# Patient Record
Sex: FEMALE | Race: BLACK OR AFRICAN AMERICAN | Employment: OTHER | URBAN - METROPOLITAN AREA
[De-identification: names, ages, dates, MRNs, and addresses within clinical notes are randomized per-mention and may not be internally consistent; named-entity substitution may affect disease eponyms.]

---

## 2021-10-09 ENCOUNTER — TELEPHONE (OUTPATIENT)
Dept: INTERNAL MEDICINE CLINIC | Facility: CLINIC | Age: 81
End: 2021-10-09

## 2021-10-10 NOTE — TELEPHONE ENCOUNTER
Can do 4 PM 11-5-21 at [facility]. Can see Dr. Desmond Palomares. WE needs records from prior MD's.

## 2021-11-04 PROBLEM — Z71.85 VACCINE COUNSELING: Status: ACTIVE | Noted: 2021-11-04

## 2021-11-04 PROBLEM — Z12.11 COLON CANCER SCREENING: Status: ACTIVE | Noted: 2021-11-04

## 2021-11-04 PROBLEM — Z00.00 ADULT GENERAL MEDICAL EXAMINATION: Status: ACTIVE | Noted: 2021-11-04

## 2021-11-05 ENCOUNTER — OFFICE VISIT (OUTPATIENT)
Dept: INTERNAL MEDICINE CLINIC | Facility: CLINIC | Age: 81
End: 2021-11-05
Payer: MEDICARE

## 2021-11-05 VITALS
WEIGHT: 151 LBS | HEART RATE: 85 BPM | HEIGHT: 61 IN | DIASTOLIC BLOOD PRESSURE: 63 MMHG | SYSTOLIC BLOOD PRESSURE: 110 MMHG | TEMPERATURE: 98 F | RESPIRATION RATE: 17 BRPM | BODY MASS INDEX: 28.51 KG/M2

## 2021-11-05 DIAGNOSIS — C50.911 INFILTRATING DUCTAL CARCINOMA OF RIGHT BREAST, STAGE 1 (HCC): ICD-10-CM

## 2021-11-05 DIAGNOSIS — E78.5 DYSLIPIDEMIA: ICD-10-CM

## 2021-11-05 DIAGNOSIS — Z12.11 COLON CANCER SCREENING: ICD-10-CM

## 2021-11-05 DIAGNOSIS — D86.9 SARCOIDOSIS: ICD-10-CM

## 2021-11-05 DIAGNOSIS — I10 PRIMARY HYPERTENSION: ICD-10-CM

## 2021-11-05 DIAGNOSIS — D50.0 IRON DEFICIENCY ANEMIA DUE TO CHRONIC BLOOD LOSS: ICD-10-CM

## 2021-11-05 DIAGNOSIS — M06.9 RHEUMATOID ARTHRITIS, INVOLVING UNSPECIFIED SITE, UNSPECIFIED WHETHER RHEUMATOID FACTOR PRESENT (HCC): ICD-10-CM

## 2021-11-05 DIAGNOSIS — Z00.00 ADULT GENERAL MEDICAL EXAMINATION: Primary | ICD-10-CM

## 2021-11-05 DIAGNOSIS — E83.52 HYPERCALCEMIA: ICD-10-CM

## 2021-11-05 DIAGNOSIS — Z71.85 VACCINE COUNSELING: ICD-10-CM

## 2021-11-05 DIAGNOSIS — E11.9 TYPE 2 DIABETES MELLITUS WITHOUT COMPLICATION, WITHOUT LONG-TERM CURRENT USE OF INSULIN (HCC): ICD-10-CM

## 2021-11-05 DIAGNOSIS — R05.9 COUGH: ICD-10-CM

## 2021-11-05 PROBLEM — M19.90 OSTEOARTHROSIS: Status: ACTIVE | Noted: 2021-11-05

## 2021-11-05 PROCEDURE — 99205 OFFICE O/P NEW HI 60 MIN: CPT | Performed by: INTERNAL MEDICINE

## 2021-11-05 RX ORDER — SPIRONOLACTONE 25 MG/1
25 TABLET ORAL DAILY
COMMUNITY
End: 2021-12-15

## 2021-11-05 RX ORDER — PREDNISONE 1 MG/1
1 TABLET ORAL
COMMUNITY
End: 2021-11-23

## 2021-11-05 RX ORDER — CYCLOBENZAPRINE HCL 10 MG
10 TABLET ORAL NIGHTLY
COMMUNITY
End: 2021-12-15

## 2021-11-05 RX ORDER — MONTELUKAST SODIUM 10 MG/1
10 TABLET ORAL NIGHTLY
Qty: 30 TABLET | Refills: 3 | Status: SHIPPED | OUTPATIENT
Start: 2021-11-05 | End: 2021-12-07 | Stop reason: ALTCHOICE

## 2021-11-05 RX ORDER — FERROUS SULFATE 325(65) MG
325 TABLET ORAL
COMMUNITY

## 2021-11-05 RX ORDER — ACETAMINOPHEN 500 MG
500 TABLET ORAL EVERY 6 HOURS PRN
COMMUNITY

## 2021-11-05 RX ORDER — ASPIRIN 81 MG/1
81 TABLET ORAL DAILY
COMMUNITY

## 2021-11-05 RX ORDER — CLONAZEPAM 0.5 MG/1
0.5 TABLET ORAL 2 TIMES DAILY PRN
COMMUNITY

## 2021-11-05 RX ORDER — MECLIZINE HYDROCHLORIDE 25 MG/1
25 TABLET ORAL
COMMUNITY
End: 2021-12-07 | Stop reason: ALTCHOICE

## 2021-11-05 RX ORDER — DOCUSATE SODIUM 100 MG/1
100 CAPSULE, LIQUID FILLED ORAL DAILY
COMMUNITY

## 2021-11-05 RX ORDER — ATORVASTATIN CALCIUM 80 MG/1
80 TABLET, FILM COATED ORAL DAILY
COMMUNITY
Start: 2021-07-13 | End: 2021-11-09

## 2021-11-05 RX ORDER — CARVEDILOL 25 MG/1
25 TABLET ORAL 2 TIMES DAILY WITH MEALS
COMMUNITY
End: 2022-01-17

## 2021-11-05 NOTE — PROGRESS NOTES
HPI:    Patient ID: Jennifer Spear is a 79year old female. Jennifer Spear is a 79year old female who presents to establish care in [place]. Just moved from [place]. New to me.    HPI:   Patient presents with:  Establish Care: Patient takes one tablet by mouth twice a day with meals. • atorvastatin 80 MG Oral Tab Take 80 mg by mouth daily. • montelukast (SINGULAIR) 10 MG Oral Tab Take 1 tablet (10 mg total) by mouth nightly.  30 tablet 3   • cyanocobalamin 100 MCG Oral Ta 11/5/2021   BMI (kg/m2) - 28.53   Weight (enc vitals) - 151 lb   BP (enc vitals) - 110/63   Last Foot Exam - 11/5/2021   PHQ2 Score - 0      Last eye exam had done. In [place]. Will check records.   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 The current episode started more than 1 month ago. 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 ). The problem occurs constantly.  The problem has been polyphagia and polyuria. Genitourinary: Negative for decreased urine volume, difficulty urinating, dysuria, flank pain, frequency, hematuria, menstrual problem, pelvic pain, urgency, vaginal bleeding, vaginal discharge and vaginal pain.         Only since 25 MG Oral Tab Take 25 mg by mouth 2 (two) times daily with meals. Patient takes one tablet by mouth twice a day with meals. • atorvastatin 80 MG Oral Tab Take 80 mg by mouth daily.      • montelukast (SINGULAIR) 10 MG Oral Tab Take 1 tablet (10 mg tota from Last 3 Encounters:  11/05/21 : 151 lb (68.5 kg)      Physical Exam  Vitals and nursing note reviewed. Constitutional:       General: She is not in acute distress. Appearance: Normal appearance. She is well-developed and well-groomed.  She is not chemosis, exudate or hemorrhage. Left eye: Left conjunctiva is not injected. No chemosis, exudate or hemorrhage. Pupils: Pupils are equal, round, and reactive to light. Neck:      Thyroid: No thyroid mass, thyromegaly or thyroid tenderness. right CVA tenderness, left CVA tenderness, guarding or rebound. Genitourinary:     Exam position: Supine. Musculoskeletal:      Right wrist: No swelling, deformity, effusion, lacerations, tenderness, bony tenderness, snuff box tenderness or crepitus.  D Bilateral barefoot skin diabetic exam is normal, visualized feet and the appearance is normal.  Bilateral sensation of both feet is normal.  Pulsation pedal pulse exam of both lower legs/feet is normal as well.          ASSESSMENT/PLAN:   Adult general least 30 minutes 3-4 times a week. Check blood pressures at different times on different days. Can purchase own blood pressure monitor. If not, check at local pharmacy. Bake foods more and grill occasionally. Avoid fried foods. No salt.  Use other seasoning

## 2021-11-05 NOTE — PATIENT INSTRUCTIONS
ASSESSMENT/PLAN:   Adult general medical examination  (primary encounter diagnosis) check records from [place]. Vaccine counseling recommend Covid #3 vaccine. Can call [phone] to get vaccine or through local pharmacy.   2 weeks after Covid vac foods. No salt. Use other seasonings. Hypercalcemia check blood. Dyslipidemia check blood. Iron deficiency anemia due to chronic blood loss check blood 2 months after being on iron therapy.     Colon cancer screening check testing in [place] chickenpox can get shingles.  But your risk is greater if you:  · Are age 48 or older  · Have an illness that weakens your immune system, such as HIV/AIDS  · Have cancer, especially Hodgkin disease or lymphoma  · Take medicines that weaken your immune syste skin.  · Use calamine lotion to calm itchy skin. · Ask your provider about over-the-counter pain relievers. If your pain is severe, your provider may prescribe stronger pain medicines. What are possible complications of shingles?   Shingles often goes kathrine never had chickenpox or the vaccine  · Babies who were born early (premature) or who had low weight at birth  · People with weak immune systems (such as people getting chemotherapy for cancer, people who have had organ transplants, or people with HIV infec nerve pain from shingles. How should I use this medicine? This vaccine is for injection in a muscle. It is given by a health care professional.  Talk to your pediatrician regarding the use of this medicine in children.  This medicine is not approved for u summary. It may not cover all possible information. If you have questions about this medicine, talk to your doctor, pharmacist, or health care provider.  Copyright© 2020 [employer]          Exercise to Manage Your Blood Sugar   Being physically active every d With every step, you’re doing a little more to help your body use insulin. Add strength training   Strength training makes muscles stronger. It also helps muscles use insulin better. Ask your healthcare provider if this type of exercise is right for you. longer than usual. Also check your blood sugar if the activity was unplanned. Make it a habit to check your blood sugar before being active. And check again a few hours later. Write down how activity affects your numbers.  If you take insulin, you may be ab healthcare provider before doing the test on other places. Continuous glucose monitoring  Continuous glucose monitoring (CGM) may be an option for checking your blood sugar levels. It tracks your blood sugar level throughout the day and night.  This can he lower numbers after exercise. This helps you understand how to stay in your target range. Sharing your log with your healthcare team  Bring your blood sugar log and glucose meter with you to all of your appointments.  This can help your healthcare team evi to draw a drop of blood. Put the strip in the meter first. Then touch the test strip to the drop of blood. The meter then gives you a number (reading) that tells you the level of your blood sugar.   Aim for your target range  Your blood sugar should be in y came with your glucose meter. Be sure that your test strips were designed to be used with your meter and are not .   Step 2. Draw a drop of blood  · Prick the side of your finger with the lancet. Squeeze gently until you get a drop of blood.  Savannah Gallo and dry your hands. Next, you’ll prick the side of your finger with a tiny lancet or with a spring-loaded lancing device to draw a tiny drop of blood onto a test strip.    The strip goes into the meter first. Next a drop of blood is placed on the tip of the check your blood sugar. Your meter may also have a memory feature. Your healthcare provider can check this at your next visit. You may be told by your healthcare provider to check your blood sugar in the morning, at bedtime, and before and after meals.  Be or prevent complications of diabetes. And it’s a great way to ease stress. If you aren’t often active, see your healthcare provider before getting started. How much activity do you need? If daily activity is new to you, start slow and steady.  Begin wit drop faster than normal. This is especially true if you take medicine to manage your blood sugar. But there are things you can do to help reduce the risk for low blood sugar. Keep these tips in mind:   · Always carry ID when you exercise outside your home. the exercise. Talk with your healthcare provider to learn more.    Pasha last reviewed this educational content on 11/1/2019  © 4768-2800 [employer]. All rights reserved.  This information is not intended as a substitute for professional m a day, at consistent times. Don't skip meals. If you are hungry between meals, eat a small, low-carbohydrate snack. · Talk with your healthcare provider if you drink alcohol. Alcohol can have unpredictable effects on blood glucose.  It's also high in empty program.     Your checkup  A checkup helps ensure that your fitness plan will bring you the most benefit.  It also lowers the risks that may come with physical activity. As part of your checkup:   · You may have a test called a hemoglobin A1C.  The A1C test used as energy. Without insulin, too much sugar collects in your blood. There are 3 types of diabetes. They are type 1, type 2, and gestational diabetes. What is prediabetes? Prediabetes often happens before type 2 diabetes.  Prediabetes is when blood diabetes. Type 1 diabetes is an autoimmune disease. The body's immune system destroys the cells in the pancreas that make insulin. This means your body makes little or no insulin. People with type 1 diabetes must take insulin every day to live.  About 1 in substitute for professional medical care. Always follow your healthcare professional's instructions. Your Diabetes Foot Care Program     Every day you depend on your feet to keep you moving. But when you have diabetes, your feet need special care.  E needed, your healthcare provider will suggest certain tests to learn more about your feet. These include:  · Doppler tests. These measure blood flow in the feet and lower leg. · X-rays. These can show bone or joint problems. · Other imaging tests.  These often.  Learn about self-care  The more you know about diabetes and your feet, the easier it will be to prevent problems. Your healthcare team can teach you how to check your feet. And teach you to look for warning signs.  They can also give you other foot examples of foods containing about 15 grams of carbohydrates (1 serving of carbohydrates):  · 1/2 cup of canned or frozen fruit  · A small piece of fresh fruit (4 ounces)  · 1 slice of bread  · 1/2 cup of oatmeal  · 1/3 cup of rice  · 4 to 6 crackers  · 1/ take seconds. · Learn to read food labels. Be sure to look at serving size, total carbohydrates, fiber, calories, sugar, and saturated and trans fats. Look for healthier alternatives to foods that have added sugar.   · Plan ahead for parties so you can sti high.  Pasha last reviewed this educational content on 11/1/2018  © 8212-8482 [employer] [facility] [employer]. All rights reserved. This information is not intended as a substitute for professional medical care.  Always follow your healthcare professional's in effect on your weight and your risk of heart disease. When you have diabetes, it’s important to control your weight and protect your heart. Foods that are high in fat include whole milk, cheese, snack foods, and desserts.  You can eat more of the \"heart-he out a plan to be ready for these cases. These tips can help. When you travel   On your trip, stick to your meal and exercise plans as much as you can:   · Carry a letter from your provider.  Ask your provider to include how your diabetes is treated (ins are wearing an insulin pump before going through body scanners or personal screenings.   Be prepared   Tips to be prepared while traveling:   · Keep a diabetes kit. Include your blood glucose meter, batteries, test strips, and a lancing device.  Also includ producers use this term to describe their products. For example, the label on a cereal box could say that 1 cup of dry cereal = 1 serving.   · A portion (also called a “helping”) is how much you eat or how much you put on your plate at a meal. For example, good rule of thumb: Devote half your plate to vegetables and green salad. Split the other half between protein and starchy carbohydrates. Fruit makes a good dessert. Servings and portions   Many different words are used to describe amounts of food.  If yo intended as a substitute for professional medical care. Always follow your healthcare professional's instructions. Diabetes: Caring for Your Body  When you have diabetes, your body needs special care.  This care helps you stay healthy and prevent pro you smoke, quit  Smoking is dangerous for everyone, especially people with diabetes. It can harm the blood vessels in your eyes, kidneys, nerves, and heart. It raises blood pressure. Smoking can also slow healing, so infections are more likely.  Ask your he poor blood flow or infection. Symptoms include tenderness and an increase in the size of your foot. Extra fluid (edema) makes it harder for a wound to heal.  · Warm or hot areas on your feet may be signs of infection.  A foot that is cold may not be getting is low in calories. · “Low fat” means 3 g fat or less per serving. “Reduced fat” or “less fat” means 25% less fat than the regular version. Some of this fat may be saturated or trans fat. And the calories per serving may be similar to the regular version. blood sugar are to:  · Eat meals and snacks at the same time every day  · Eat about the same amount of food  · Exercise each day     Eat from all the food groups  The basis of a healthy meal plan is variety (eating many different types of foods).  Look for educational content on 4/1/2019  © 6123-3736 [employer]. All rights reserved. This information is not intended as a substitute for professional medical care. Always follow your healthcare professional's instructions.         Diabetic Foot Car them every day. · Trim toenails carefully, and don't cut your cuticles. Ask your healthcare provider to trim any corns or calluses. · Keep your blood sugar under control with medicines, diet, and activity.   · If you smoke and have diabetes, it's very imp activity you enjoy. Start slowly and set small goals. Work activity into your daily life. Talk with your healthcare provider before starting an activity program. You may need to have a checkup before you begin.   Start with movement  If you’re not used to b help.  · Drink water before, during, and after your exercise.    · Eat 1 to 2 hours before you exercise, if instructed. · Check your blood sugar before and after you exercise, if instructed. Check your blood sugar if you feel symptoms.   · Carry fast-actin health. With regular exercise, you can reduce your risk for heart disease and high blood pressure. You can also improve your cholesterol and triglyceride levels. Muscles after exercise are better able to use up any stored fat.   · Weight. Exercise helps you elevator. · Garden, and do housework and yard work. · Choose a parking space farther from the store. · Walk to talk to a co-worker instead of calling. · Take a 10-minute walk around the block at lunch.   · Walk to a bus stop a little farther from your h dairy products, such as 1% or fat-free milk and reduced-fat cheese    Use fiber to help control cholesterol  Foods high in fiber can help you keep your cholesterol down.  Good sources of fiber are:  · Oats  · Barley  · Whole grains  · Beans  · Vegetables  · that has no added sugar and limit your serving size. · Trim excess fat off meats and eat chicken or turkey without the skin. · Always buy lean cuts of meat and choose a healthy cooking method, like broiling, roasting, stir-frying, or grilling.   · Buy who

## 2021-11-07 ENCOUNTER — LAB ENCOUNTER (OUTPATIENT)
Dept: LAB | Facility: HOSPITAL | Age: 81
End: 2021-11-07
Attending: INTERNAL MEDICINE
Payer: MEDICARE

## 2021-11-07 DIAGNOSIS — M06.9 RHA (RHEUMATOID ARTHRITIS) (HCC): ICD-10-CM

## 2021-11-07 DIAGNOSIS — M06.9 RHEUMATOID ARTHRITIS, INVOLVING UNSPECIFIED SITE, UNSPECIFIED WHETHER RHEUMATOID FACTOR PRESENT (HCC): ICD-10-CM

## 2021-11-07 DIAGNOSIS — E11.9 TYPE 2 DIABETES MELLITUS WITHOUT COMPLICATION, WITHOUT LONG-TERM CURRENT USE OF INSULIN (HCC): ICD-10-CM

## 2021-11-07 DIAGNOSIS — D86.9 SARCOIDOSIS: Primary | ICD-10-CM

## 2021-11-07 DIAGNOSIS — Z00.00 ADULT GENERAL MEDICAL EXAMINATION: ICD-10-CM

## 2021-11-07 PROCEDURE — 84443 ASSAY THYROID STIM HORMONE: CPT

## 2021-11-07 PROCEDURE — 82043 UR ALBUMIN QUANTITATIVE: CPT

## 2021-11-07 PROCEDURE — 83540 ASSAY OF IRON: CPT | Performed by: INTERNAL MEDICINE

## 2021-11-07 PROCEDURE — 80053 COMPREHEN METABOLIC PANEL: CPT

## 2021-11-07 PROCEDURE — 84466 ASSAY OF TRANSFERRIN: CPT | Performed by: INTERNAL MEDICINE

## 2021-11-07 PROCEDURE — 82570 ASSAY OF URINE CREATININE: CPT

## 2021-11-07 PROCEDURE — 83036 HEMOGLOBIN GLYCOSYLATED A1C: CPT

## 2021-11-07 PROCEDURE — 85652 RBC SED RATE AUTOMATED: CPT

## 2021-11-07 PROCEDURE — 82728 ASSAY OF FERRITIN: CPT | Performed by: INTERNAL MEDICINE

## 2021-11-07 PROCEDURE — 86431 RHEUMATOID FACTOR QUANT: CPT

## 2021-11-07 PROCEDURE — 80061 LIPID PANEL: CPT

## 2021-11-07 PROCEDURE — 86200 CCP ANTIBODY: CPT

## 2021-11-07 PROCEDURE — 82164 ANGIOTENSIN I ENZYME TEST: CPT | Performed by: INTERNAL MEDICINE

## 2021-11-07 PROCEDURE — 82607 VITAMIN B-12: CPT | Performed by: INTERNAL MEDICINE

## 2021-11-07 PROCEDURE — 85025 COMPLETE CBC W/AUTO DIFF WBC: CPT

## 2021-11-07 PROCEDURE — 36415 COLL VENOUS BLD VENIPUNCTURE: CPT

## 2021-11-09 ENCOUNTER — HOSPITAL ENCOUNTER (OUTPATIENT)
Dept: GENERAL RADIOLOGY | Facility: HOSPITAL | Age: 81
Discharge: HOME | End: 2021-11-09
Attending: INTERNAL MEDICINE
Payer: MEDICARE

## 2021-11-09 ENCOUNTER — TELEPHONE (OUTPATIENT)
Dept: INTERNAL MEDICINE CLINIC | Facility: CLINIC | Age: 81
End: 2021-11-09

## 2021-11-09 DIAGNOSIS — M06.9 RHEUMATOID ARTHRITIS, INVOLVING UNSPECIFIED SITE, UNSPECIFIED WHETHER RHEUMATOID FACTOR PRESENT (HCC): ICD-10-CM

## 2021-11-09 DIAGNOSIS — E11.9 TYPE 2 DIABETES MELLITUS WITHOUT COMPLICATION, WITHOUT LONG-TERM CURRENT USE OF INSULIN (HCC): Primary | ICD-10-CM

## 2021-11-09 PROCEDURE — 73130 X-RAY EXAM OF HAND: CPT | Performed by: INTERNAL MEDICINE

## 2021-11-09 RX ORDER — GLIMEPIRIDE 2 MG/1
2 TABLET ORAL 2 TIMES DAILY
Qty: 60 TABLET | Refills: 1 | Status: SHIPPED | OUTPATIENT
Start: 2021-11-09 | End: 2021-12-16

## 2021-11-09 RX ORDER — ATORVASTATIN CALCIUM 80 MG/1
80 TABLET, FILM COATED ORAL DAILY
Qty: 990 TABLET | Refills: 1 | Status: SHIPPED | OUTPATIENT
Start: 2021-11-09

## 2021-11-09 RX ORDER — ACETAMINOPHEN AND CODEINE PHOSPHATE 300; 30 MG/1; MG/1
1 TABLET ORAL EVERY 6 HOURS PRN
Qty: 15 TABLET | Refills: 0 | Status: SHIPPED | OUTPATIENT
Start: 2021-11-09 | End: 2021-12-07 | Stop reason: ALTCHOICE

## 2021-11-15 ENCOUNTER — OFFICE VISIT (OUTPATIENT)
Dept: RHEUMATOLOGY | Facility: CLINIC | Age: 81
End: 2021-11-15
Payer: MEDICARE

## 2021-11-15 ENCOUNTER — LAB ENCOUNTER (OUTPATIENT)
Dept: LAB | Facility: HOSPITAL | Age: 81
End: 2021-11-15
Attending: INTERNAL MEDICINE
Payer: MEDICARE

## 2021-11-15 VITALS
SYSTOLIC BLOOD PRESSURE: 109 MMHG | WEIGHT: 148 LBS | BODY MASS INDEX: 27.94 KG/M2 | HEART RATE: 90 BPM | DIASTOLIC BLOOD PRESSURE: 74 MMHG | HEIGHT: 61 IN

## 2021-11-15 DIAGNOSIS — M05.79 RHEUMATOID ARTHRITIS INVOLVING MULTIPLE SITES WITH POSITIVE RHEUMATOID FACTOR (HCC): ICD-10-CM

## 2021-11-15 DIAGNOSIS — M05.79 RHEUMATOID ARTHRITIS INVOLVING MULTIPLE SITES WITH POSITIVE RHEUMATOID FACTOR (HCC): Primary | ICD-10-CM

## 2021-11-15 PROCEDURE — 86803 HEPATITIS C AB TEST: CPT | Performed by: INTERNAL MEDICINE

## 2021-11-15 PROCEDURE — 82955 ASSAY OF G6PD ENZYME: CPT | Performed by: INTERNAL MEDICINE

## 2021-11-15 PROCEDURE — 87340 HEPATITIS B SURFACE AG IA: CPT | Performed by: INTERNAL MEDICINE

## 2021-11-15 PROCEDURE — 86706 HEP B SURFACE ANTIBODY: CPT | Performed by: INTERNAL MEDICINE

## 2021-11-15 PROCEDURE — 86704 HEP B CORE ANTIBODY TOTAL: CPT | Performed by: INTERNAL MEDICINE

## 2021-11-15 PROCEDURE — 36415 COLL VENOUS BLD VENIPUNCTURE: CPT | Performed by: INTERNAL MEDICINE

## 2021-11-15 PROCEDURE — 99205 OFFICE O/P NEW HI 60 MIN: CPT | Performed by: INTERNAL MEDICINE

## 2021-11-15 RX ORDER — LEFLUNOMIDE 20 MG/1
20 TABLET ORAL DAILY
Qty: 90 TABLET | Refills: 0 | Status: SHIPPED | OUTPATIENT
Start: 2021-11-15 | End: 2021-12-07 | Stop reason: ALTCHOICE

## 2021-11-15 RX ORDER — METHYLPREDNISOLONE 4 MG/1
TABLET ORAL
Qty: 1 EACH | Refills: 0 | Status: SHIPPED | OUTPATIENT
Start: 2021-11-15 | End: 2021-12-27 | Stop reason: ALTCHOICE

## 2021-11-15 NOTE — PATIENT INSTRUCTIONS
You were seen today for rheumatoid arthritis  Plan to start you on leflunomide 20 mg daily.   We will need to get blood work first  We will also start you on a Medrol Dosepak, when you are done with the Dosepak then continue prednisone 3 mg daily  Again blo

## 2021-11-15 NOTE — PROGRESS NOTES
Brian Sanchez is a 79year old female who presents for Patient presents with:  Consult  .    HPI:   CC: establish care for RA  Consult: referred by PCP      This is a 80 yo F with hx of HTN, HLD, Breast cancer s/p lumpectomy and radiation (201 2021  Previous medication:  Methotrexate 2003  Blood work:  , CCP>340  ESR 85  Neg ACE    Wt Readings from Last 2 Encounters:  11/15/21 : 148 lb (67.1 kg)  11/05/21 : 151 lb (68.5 kg)    Body mass index is 27.96 kg/m².       Current Outpatient Medicat times daily with meals. Patient takes one tablet by mouth twice a day with meals. • cyanocobalamin 100 MCG Oral Tab Take 50 mcg by mouth daily. • meclizine 25 MG Oral Tab Take 25 mg by mouth.      • montelukast (SINGULAIR) 10 MG Oral Tab Take 1 tabl no heartburn, no dyshpagia, no BRBPR or melena  : no dysuria, no hx of miscarriages, no DVT Hx, no hx of OCP,   Neuro: No numbness or tingling, no headache, no hx of seizures,   Psych: no hx of anxiety or depression  ENDO: no hx of thyroid disease, no hx % 28.8   Monocytes %      % 8.2   Eosinophils %      % 1.8   Basophils %      % 0.4   Immature Granulocyte %      % 0.6   Glucose      70 - 99 mg/dL 142 (H)   Sodium      136 - 145 mmol/L 136   Potassium      3.5 - 5.1 mmol/L 4.7   Chloride      98 - 112 m and then she will taper down to her prednisone 3 mg daily  - Blood work today    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   She had a chest x-ray and

## 2021-11-16 ENCOUNTER — TELEPHONE (OUTPATIENT)
Dept: RHEUMATOLOGY | Facility: CLINIC | Age: 81
End: 2021-11-16

## 2021-11-16 ENCOUNTER — APPOINTMENT (OUTPATIENT)
Dept: HEMATOLOGY/ONCOLOGY | Facility: HOSPITAL | Age: 81
End: 2021-11-16
Attending: INTERNAL MEDICINE
Payer: MEDICARE

## 2021-11-16 DIAGNOSIS — Z78.9 HEPATITIS B CORE ANTIBODY NEGATIVE: Primary | ICD-10-CM

## 2021-11-16 NOTE — TELEPHONE ENCOUNTER
If you let patient know that her hepatitis B core antibody was positive. This could mean that she has had a previous hepatitis B infection and possibly cleared it. Your liver function tests were normal.  We will need to get some further blood work.   She

## 2021-11-16 NOTE — TELEPHONE ENCOUNTER
Spoke with patients daughter Tasha Pathak. She helps care for Tracey Garnett. Informed of Dr. Elson Gosselin note below. She verbalized understanding and stated they will go tomorrow to have blood work done. The will hold leflunomide until blood work is done.  No further que

## 2021-11-18 ENCOUNTER — LAB ENCOUNTER (OUTPATIENT)
Dept: LAB | Facility: HOSPITAL | Age: 81
End: 2021-11-18
Attending: INTERNAL MEDICINE
Payer: MEDICARE

## 2021-11-18 DIAGNOSIS — M05.79 RHEUMATOID ARTHRITIS INVOLVING MULTIPLE SITES WITH POSITIVE RHEUMATOID FACTOR (HCC): Primary | ICD-10-CM

## 2021-11-18 DIAGNOSIS — Z78.9 HEPATITIS B CORE ANTIBODY NEGATIVE: ICD-10-CM

## 2021-11-18 PROCEDURE — 87517 HEPATITIS B DNA QUANT: CPT

## 2021-11-18 PROCEDURE — 86480 TB TEST CELL IMMUN MEASURE: CPT

## 2021-11-18 PROCEDURE — 36415 COLL VENOUS BLD VENIPUNCTURE: CPT

## 2021-11-18 PROCEDURE — 82955 ASSAY OF G6PD ENZYME: CPT

## 2021-11-23 ENCOUNTER — OFFICE VISIT (OUTPATIENT)
Dept: HEMATOLOGY/ONCOLOGY | Facility: HOSPITAL | Age: 81
End: 2021-11-23
Attending: INTERNAL MEDICINE
Payer: MEDICARE

## 2021-11-23 ENCOUNTER — TELEPHONE (OUTPATIENT)
Dept: RHEUMATOLOGY | Facility: CLINIC | Age: 81
End: 2021-11-23

## 2021-11-23 VITALS
OXYGEN SATURATION: 99 % | DIASTOLIC BLOOD PRESSURE: 62 MMHG | RESPIRATION RATE: 16 BRPM | TEMPERATURE: 98 F | SYSTOLIC BLOOD PRESSURE: 134 MMHG | HEART RATE: 71 BPM

## 2021-11-23 DIAGNOSIS — Z78.9 HEPATITIS B CORE ANTIBODY NEGATIVE: Primary | ICD-10-CM

## 2021-11-23 DIAGNOSIS — D50.0 IRON DEFICIENCY ANEMIA DUE TO CHRONIC BLOOD LOSS: Primary | ICD-10-CM

## 2021-11-23 PROCEDURE — 96374 THER/PROPH/DIAG INJ IV PUSH: CPT

## 2021-11-23 RX ORDER — SULFASALAZINE 500 MG/1
TABLET ORAL
Qty: 120 TABLET | Refills: 1 | Status: SHIPPED | OUTPATIENT
Start: 2021-11-23

## 2021-11-23 RX ORDER — PREDNISONE 1 MG/1
3 TABLET ORAL
Qty: 90 TABLET | Refills: 1 | Status: SHIPPED | OUTPATIENT
Start: 2021-11-23 | End: 2022-01-28 | Stop reason: ALTCHOICE

## 2021-11-23 NOTE — TELEPHONE ENCOUNTER
Discussed with patient that her blood work showed a hepatitis B core antibody and also a positive HBV NAAT  We will be referring patient to the GI specialist  Instead of leflunomide will be placing her on sulfasalazine

## 2021-11-23 NOTE — PROGRESS NOTES
Pt to infusion for Venofer 1/3 weekly. Discussed admin and possible s/e with pt, who verbalized understanding. PIV x3 attempts. Venofer given with free flowing ns over 6 mins. Tolerated well. Observed for 30 mins post without s/s of allergic reaction.  D

## 2021-11-30 ENCOUNTER — OFFICE VISIT (OUTPATIENT)
Dept: HEMATOLOGY/ONCOLOGY | Facility: HOSPITAL | Age: 81
End: 2021-11-30
Attending: INTERNAL MEDICINE
Payer: MEDICARE

## 2021-11-30 VITALS
TEMPERATURE: 98 F | DIASTOLIC BLOOD PRESSURE: 54 MMHG | OXYGEN SATURATION: 100 % | RESPIRATION RATE: 16 BRPM | HEART RATE: 69 BPM | SYSTOLIC BLOOD PRESSURE: 128 MMHG

## 2021-11-30 DIAGNOSIS — D50.0 IRON DEFICIENCY ANEMIA DUE TO CHRONIC BLOOD LOSS: Primary | ICD-10-CM

## 2021-11-30 PROCEDURE — 96374 THER/PROPH/DIAG INJ IV PUSH: CPT

## 2021-11-30 NOTE — PROGRESS NOTES
Pt to infusion for Venofer 2/3 weekly. Pt reports feeling noticeably better after first dose. PIV x3 attempts. Venofer given with free flowing ns over 5 mins. Tolerated well. Observed for 30 mins post without s/s of allergic reaction.  Discharged ambulato

## 2021-12-03 ENCOUNTER — HOSPITAL ENCOUNTER (EMERGENCY)
Facility: HOSPITAL | Age: 81
Discharge: HOME | End: 2021-12-03
Payer: MEDICARE

## 2021-12-03 ENCOUNTER — APPOINTMENT (OUTPATIENT)
Dept: CT IMAGING | Facility: HOSPITAL | Age: 81
End: 2021-12-03
Payer: MEDICARE

## 2021-12-03 VITALS
RESPIRATION RATE: 16 BRPM | HEIGHT: 61 IN | DIASTOLIC BLOOD PRESSURE: 56 MMHG | WEIGHT: 149 LBS | TEMPERATURE: 98 F | OXYGEN SATURATION: 96 % | BODY MASS INDEX: 28.13 KG/M2 | SYSTOLIC BLOOD PRESSURE: 119 MMHG | HEART RATE: 78 BPM

## 2021-12-03 DIAGNOSIS — R51.9 ACUTE NONINTRACTABLE HEADACHE, UNSPECIFIED HEADACHE TYPE: Primary | ICD-10-CM

## 2021-12-03 PROCEDURE — 70450 CT HEAD/BRAIN W/O DYE: CPT

## 2021-12-03 PROCEDURE — 99284 EMERGENCY DEPT VISIT MOD MDM: CPT

## 2021-12-03 PROCEDURE — 96375 TX/PRO/DX INJ NEW DRUG ADDON: CPT

## 2021-12-03 PROCEDURE — 96374 THER/PROPH/DIAG INJ IV PUSH: CPT

## 2021-12-03 PROCEDURE — 96361 HYDRATE IV INFUSION ADD-ON: CPT

## 2021-12-03 RX ORDER — KETOROLAC TROMETHAMINE 15 MG/ML
15 INJECTION, SOLUTION INTRAMUSCULAR; INTRAVENOUS ONCE
Status: COMPLETED | OUTPATIENT
Start: 2021-12-03 | End: 2021-12-03

## 2021-12-03 RX ORDER — METOCLOPRAMIDE HYDROCHLORIDE 5 MG/ML
5 INJECTION INTRAMUSCULAR; INTRAVENOUS ONCE
Status: COMPLETED | OUTPATIENT
Start: 2021-12-03 | End: 2021-12-03

## 2021-12-03 RX ORDER — LORAZEPAM 2 MG/ML
0.5 INJECTION INTRAMUSCULAR ONCE
Status: COMPLETED | OUTPATIENT
Start: 2021-12-03 | End: 2021-12-03

## 2021-12-03 NOTE — ED PROVIDER NOTES
Patient Seen in: [facility] Emergency Department    History   Patient presents with:  Headache    Stated Complaint: headaches    HPI    Patient presents to the emergency department with headache.   She states she does get headaches if she doesn't g Tab,  Take 3 tablets (3 mg total) by mouth daily with breakfast. Patient takes 3 tablets by mouth daily.    sulfaSALAzine 500 MG Oral Tab,  With meals, 500 mg twice a day for 2 weeks then increase to 1000 mg twice a day   methylPREDNISolone 4 MG Oral Tablet MCG Oral Tab,  Take 50 mcg by mouth daily. meclizine 25 MG Oral Tab,  Take 25 mg by mouth.   montelukast (SINGULAIR) 10 MG Oral Tab,  Take 1 tablet (10 mg total) by mouth nightly.          Review of Systems  Constitutional: no fever, no allergy symptoms well.    CT scan is already been done in triage we will wait for results    CT scan showed no acute process.   I discussed with her I could not tell her if it is related to her medications I did recommend talking to her doctors about these medications as sh

## 2021-12-03 NOTE — ED INITIAL ASSESSMENT (HPI)
Patient here with headaches for the past week. Patient has been taking Excedrin with minimal relief. Patient states she has hx of aneurism and brain bleed in the past. Patient having pain mostly on L side of head.

## 2021-12-04 ENCOUNTER — PATIENT MESSAGE (OUTPATIENT)
Dept: RHEUMATOLOGY | Facility: CLINIC | Age: 81
End: 2021-12-04

## 2021-12-06 ENCOUNTER — PATIENT MESSAGE (OUTPATIENT)
Dept: RHEUMATOLOGY | Facility: CLINIC | Age: 81
End: 2021-12-06

## 2021-12-06 ENCOUNTER — OFFICE VISIT (OUTPATIENT)
Dept: ENDOCRINOLOGY CLINIC | Facility: CLINIC | Age: 81
End: 2021-12-06
Payer: MEDICARE

## 2021-12-06 VITALS — WEIGHT: 195 LBS | DIASTOLIC BLOOD PRESSURE: 87 MMHG | SYSTOLIC BLOOD PRESSURE: 136 MMHG | BODY MASS INDEX: 37 KG/M2

## 2021-12-06 DIAGNOSIS — E11.9 NEWLY DIAGNOSED DIABETES (HCC): Primary | ICD-10-CM

## 2021-12-06 DIAGNOSIS — E11.65 TYPE 2 DIABETES MELLITUS WITH HYPERGLYCEMIA, WITHOUT LONG-TERM CURRENT USE OF INSULIN (HCC): ICD-10-CM

## 2021-12-06 PROCEDURE — 36416 COLLJ CAPILLARY BLOOD SPEC: CPT | Performed by: NURSE PRACTITIONER

## 2021-12-06 PROCEDURE — 99204 OFFICE O/P NEW MOD 45 MIN: CPT | Performed by: NURSE PRACTITIONER

## 2021-12-06 PROCEDURE — 95250 CONT GLUC MNTR PHYS/QHP EQP: CPT | Performed by: NURSE PRACTITIONER

## 2021-12-06 RX ORDER — BLOOD SUGAR DIAGNOSTIC
STRIP MISCELLANEOUS
Qty: 100 STRIP | Refills: 1 | Status: SHIPPED | OUTPATIENT
Start: 2021-12-06

## 2021-12-06 RX ORDER — LANCETS 33 GAUGE
EACH MISCELLANEOUS
Qty: 100 EACH | Refills: 1 | Status: SHIPPED | OUTPATIENT
Start: 2021-12-06

## 2021-12-06 RX ORDER — BLOOD-GLUCOSE METER
1 EACH MISCELLANEOUS DAILY
Qty: 1 KIT | Refills: 0 | Status: SHIPPED | OUTPATIENT
Start: 2021-12-06

## 2021-12-06 NOTE — TELEPHONE ENCOUNTER
From: Polina Ford  To:  Fe Raza MD  Sent: 12/4/2021 3:10 PM CST  Subject: Questions concerning medications    Hi Dr. Monroe Coles  My mom Polina Ford ([birth year]) started taking 500mg twice a day SulfaSalazine 11/24, on about 11/29 she s

## 2021-12-06 NOTE — TELEPHONE ENCOUNTER
She is able to take Excedrin, Aleve or even Motrin for her headache.   But if it persist even on these medications would recommend to contact her PCP

## 2021-12-06 NOTE — PROGRESS NOTES
Received verbal order to place van pro sensor from [facility]. RN discussed with patient and daughter sensor care/placement process. Sensor applied on the left upper arm and tolerated it well.   RN advised patient to call the office if sensor falls off in less

## 2021-12-06 NOTE — PATIENT INSTRUCTIONS
A1C: 7.2% on 11/7/2021. Blood glucose: 234 in clinic today    Medications:   -continue with glimepiride 2mg twice daily   -we will place a continuous glucometer sensor to arm to monitor glucose readings closely.  Please record symptoms or any significant

## 2021-12-06 NOTE — PROGRESS NOTES
Name: Yumiko Geller  Date: 12/6/2021    Referring Physician: No ref.  provider found    CHIEF COMPLAINT   Patient presents with:  New Patient    HISTORY OF PRESENT ILLNESS   Yumiko Geller is a 79year old female who presents for new co intolerance  Eyes: Negative for:  Visual changes, proptosis, blurring  ENT: Negative for:  dysphagia, neck swelling, dysphonia  Respiratory: Negative for: hemoptysis, shortness of breath, cough, or dyspnea.   Cardiovascular: Negative for:  chest pain, chest mouth daily. Patient takes 2 tablets by mouth daily. , Disp: , Rfl:   •  cyclobenzaprine 10 MG Oral Tab, Take 10 mg by mouth nightly. Patient is taking one tablet by mouth nightly as needed for muscle spasms. , Disp: , Rfl:   •  docusate sodium 100 MG Oral C Never      Drug use: Never    PAST MEDICAL HISTORY:   Past Medical History:   Diagnosis Date   • Essential hypertension    • Rheumatoid arthritis ([facility] [address]        PAST SURGICAL HISTORY:   Past Surgical History:   Procedure Laterality Date   • CORRECT BUNION,SI Patient verbalizes understanding of the importance of glycemic control and the goals of therapy.    Per ADA 2021 guidelines, it is recommended that older adults (age 72 and above) who are healthy with few coexisting chronic illnesses, intact cognitive and f instructed to call sooner if they develop Blood glucose readings <75 and/or if they have readings persistently >200. The risks and benefits of my recommendations, as well as other treatment options were discussed with the patient today.  questions were

## 2021-12-07 ENCOUNTER — NURSE TRIAGE (OUTPATIENT)
Dept: INTERNAL MEDICINE CLINIC | Facility: CLINIC | Age: 81
End: 2021-12-07

## 2021-12-07 ENCOUNTER — OFFICE VISIT (OUTPATIENT)
Dept: INTERNAL MEDICINE CLINIC | Facility: CLINIC | Age: 81
End: 2021-12-07
Payer: MEDICARE

## 2021-12-07 ENCOUNTER — OFFICE VISIT (OUTPATIENT)
Dept: HEMATOLOGY/ONCOLOGY | Facility: HOSPITAL | Age: 81
End: 2021-12-07
Attending: INTERNAL MEDICINE
Payer: MEDICARE

## 2021-12-07 VITALS
DIASTOLIC BLOOD PRESSURE: 81 MMHG | WEIGHT: 156 LBS | HEIGHT: 61 IN | BODY MASS INDEX: 29.45 KG/M2 | SYSTOLIC BLOOD PRESSURE: 132 MMHG | RESPIRATION RATE: 16 BRPM | HEART RATE: 75 BPM

## 2021-12-07 VITALS
RESPIRATION RATE: 16 BRPM | OXYGEN SATURATION: 94 % | SYSTOLIC BLOOD PRESSURE: 125 MMHG | DIASTOLIC BLOOD PRESSURE: 47 MMHG | TEMPERATURE: 98 F | HEART RATE: 73 BPM

## 2021-12-07 DIAGNOSIS — D50.0 IRON DEFICIENCY ANEMIA DUE TO CHRONIC BLOOD LOSS: Primary | ICD-10-CM

## 2021-12-07 DIAGNOSIS — R51.9 ACUTE NONINTRACTABLE HEADACHE, UNSPECIFIED HEADACHE TYPE: Primary | ICD-10-CM

## 2021-12-07 PROCEDURE — 99213 OFFICE O/P EST LOW 20 MIN: CPT | Performed by: NURSE PRACTITIONER

## 2021-12-07 PROCEDURE — 96374 THER/PROPH/DIAG INJ IV PUSH: CPT

## 2021-12-07 NOTE — TELEPHONE ENCOUNTER
Action Requested: Summary for Provider     []  Critical Lab, Recommendations Needed  [] Need Additional Advice  []   FYI    []   Need Orders  [] Need Medications Sent to Pharmacy  []  Other    please advise if ok for pt to have in person appt given her

## 2021-12-07 NOTE — PROGRESS NOTES
Desiree Goodrihc is a 79year old female. Patient presents with:  Headache: 2 weeks     HPI:   Patient has had a headache x 2 weeks. She states the pain starts by her left ear and radiates to the top of her head.  She is taking Excedrin migraine with every morning. 100 each 11   • acetaminophen 500 MG Oral Tab Take 500 mg by mouth every 6 (six) hours as needed for Pain. • clonazePAM 0.5 MG Oral Tab Take 0.5 mg by mouth 2 (two) times daily as needed for Anxiety.  Patient is taking one tablet by mouth Family History   Problem Relation Age of Onset   • Heart Surgery Mother         Leaky valve at 35 y/o.     • Diabetes Maternal Grandmother    • Diabetes Paternal Aunt         Celebrex [Celecoxib]    PALPITATIONS  Penicillins             ITCHING, SWELLING PLAN:   1.  Acute nonintractable headache, unspecified headache type  dw patient to go to the ER for MRI/MRA of brain to rule out aneurysm   Patient verbalized understanding  dw patient if imaging was negative to follow up with Dr. ELIAZAR GARCIA regarding her medica

## 2021-12-07 NOTE — PROGRESS NOTES
Pt to infusion for Venofer 3/3 weekly. States she has much more energy after her iron infusions. PIV started Right forearm on second attempt. Good blood return. Venofer given slowly via side port of free flowing NS IV.   C/o burning about 3/4 of the way

## 2021-12-09 ENCOUNTER — APPOINTMENT (OUTPATIENT)
Dept: CT IMAGING | Facility: HOSPITAL | Age: 81
End: 2021-12-09
Attending: EMERGENCY MEDICINE
Payer: MEDICARE

## 2021-12-09 ENCOUNTER — HOSPITAL ENCOUNTER (EMERGENCY)
Facility: HOSPITAL | Age: 81
Discharge: HOME | End: 2021-12-09
Attending: EMERGENCY MEDICINE
Payer: MEDICARE

## 2021-12-09 VITALS
WEIGHT: 155 LBS | HEIGHT: 61 IN | RESPIRATION RATE: 18 BRPM | OXYGEN SATURATION: 99 % | BODY MASS INDEX: 29.27 KG/M2 | TEMPERATURE: 97 F | HEART RATE: 85 BPM | SYSTOLIC BLOOD PRESSURE: 129 MMHG | DIASTOLIC BLOOD PRESSURE: 78 MMHG

## 2021-12-09 DIAGNOSIS — R51.9 NONINTRACTABLE HEADACHE, UNSPECIFIED CHRONICITY PATTERN, UNSPECIFIED HEADACHE TYPE: Primary | ICD-10-CM

## 2021-12-09 PROCEDURE — 85025 COMPLETE CBC W/AUTO DIFF WBC: CPT | Performed by: EMERGENCY MEDICINE

## 2021-12-09 PROCEDURE — 70496 CT ANGIOGRAPHY HEAD: CPT | Performed by: EMERGENCY MEDICINE

## 2021-12-09 PROCEDURE — 80048 BASIC METABOLIC PNL TOTAL CA: CPT | Performed by: EMERGENCY MEDICINE

## 2021-12-09 PROCEDURE — 99284 EMERGENCY DEPT VISIT MOD MDM: CPT

## 2021-12-09 PROCEDURE — 36415 COLL VENOUS BLD VENIPUNCTURE: CPT

## 2021-12-09 NOTE — ED QUICK NOTES
PATIENT APPROVED FOR DISCHARGE PER ER PROVIDER. PATIENT GIVEN VERBAL AND WRITTEN DISCHARGE INSTRUCTIONS. GIVEN INSTRUCTIONS ON  FOLLOW UP WITH PCP AND SYMPTOMS THAT NECESSITATE COMING BACK TO ED. VERBALIZES UNDERSTANDING OF DISCHARGE INSTRUCTIONS.      EAGLE

## 2021-12-09 NOTE — ED PROVIDER NOTES
CTA BRAIN (RGJ=[id number])    Result Date: 12/9/2021  CONCLUSION:   Mild microvascular ischemic disease. Streak artifact within the anterior intercerebral falx consistent with prior aneurysm clipping.   There is limited evaluation of the bilateral A2 segments s

## 2021-12-09 NOTE — ED PROVIDER NOTES
Patient Seen in: [facility] AND [facility] Emergency Department      History   Patient presents with:  Headache    Stated Complaint: headache, sent by pcp    Subjective:   HPI    Patient is an 79-year-old female that had an aneurysm clipped about 10 years ago. above.    Physical Exam     ED Triage Vitals [12/09/21 1230]   /73   Pulse 84   Resp 16   Temp 97 °F (36.1 °C)   Temp src Temporal   SpO2 98 %   O2 Device        Current:/73   Pulse 84   Temp 97 °F (36.1 °C) (Temporal)   Resp 16   Ht 154.9 cm ( All other components within normal limits   CBC W/ DIFFERENTIAL - Abnormal; Notable for the following components:    RBC 2.99 (*)     HGB 8.3 (*)     HCT 26.4 (*)     RDW-SD 68.1 (*)     RDW 21.4 (*)     All other components within normal limits   CBC WITH

## 2021-12-09 NOTE — ED INITIAL ASSESSMENT (HPI)
HISTORY OF CEREBRAL ANEURYSM. C/O HEADACHES SINCE BEGINNING OF MONTH. PAIN WORSENING SINCE ONSET. FELL APPROX 3 WEEKS AGO BUT DENIES HEAD INJURY. DENIES USE OF BLOOD THINNER. STOPPED TAKING ASA WHEN HEADACHES BEGAN. SENT BY HER MD FOR AN MRI/MRA BRAIN.

## 2021-12-15 RX ORDER — SPIRONOLACTONE 25 MG/1
25 TABLET ORAL DAILY
Qty: 90 TABLET | Refills: 1 | Status: SHIPPED | OUTPATIENT
Start: 2021-12-15 | End: 2022-03-11

## 2021-12-15 RX ORDER — CYCLOBENZAPRINE HCL 10 MG
10 TABLET ORAL NIGHTLY
Qty: 90 TABLET | Refills: 1 | Status: SHIPPED
Start: 2021-12-15 | End: 2021-12-29

## 2021-12-15 NOTE — TELEPHONE ENCOUNTER
Refill passed per [address] protocol. Requested Prescriptions   Pending Prescriptions Disp Refills    spironolactone 25 MG Oral Tab 90 tablet 1     Sig: Take 1 tablet (25 mg total) by mouth daily. Patient takes 2 tablets by mouth daily. Hypertensive Medications Protocol Passed - 12/15/2021  2:32 PM        Passed - CMP or BMP in past 12 months        Passed - Appointment in past 6 or next 3 months        Passed - GFR  > 50     Lab Results   Component Value Date    GFRAA 57 (L) 12/09/2021                    cyclobenzaprine 10 MG Oral Tab 90 tablet 1     Sig: Take 1 tablet (10 mg total) by mouth nightly. Patient is taking one tablet by mouth nightly as needed for muscle spasms.         There is no refill protocol information for this order            Recent Outpatient Visits              1 week ago Acute nonintractable headache, unspecified headache type    [address], [facility], [facility]    Office Visit    1 week ago Iron deficiency anemia due to chronic blood loss    [address] Visit    1 week ago Newly diagnosed diabetes [facility])    [address] Endocrinology [facility], [facility]    Office Visit    2 weeks ago Iron deficiency anemia due to chronic blood loss    [address] Visit    3 weeks ago Iron deficiency anemia due to chronic blood loss    [address] Visit            Future Appointments         Provider Department Appt Notes    Tomorrow [address] Endocrinology diet education, [facility] pro download    In 2 weeks Jose R Henderson MD [address] 2 6 week follow up     In 3 weeks Betty Morrison MD [place], [address] sarcoidosis    In 3 weeks [facility], [address] [place] [address], [facility] 2 Month Follow Up In 1 month MD Gabriel Tohmas January, [address] [facility] [address], [facility]

## 2021-12-15 NOTE — TELEPHONE ENCOUNTER
Please review. Protocol Failed / No Protocol. Requested Prescriptions   Pending Prescriptions Disp Refills    cyclobenzaprine 10 MG Oral Tab 90 tablet 1     Sig: Take 1 tablet (10 mg total) by mouth nightly. Patient is taking one tablet by mouth nightly as needed for muscle spasms. There is no refill protocol information for this order       Signed Prescriptions Disp Refills    spironolactone 25 MG Oral Tab 90 tablet 1     Sig: Take 1 tablet (25 mg total) by mouth daily. Patient takes 2 tablets by mouth daily.         Hypertensive Medications Protocol Passed - 12/15/2021  2:32 PM        Passed - CMP or BMP in past 12 months        Passed - Appointment in past 6 or next 3 months        Passed - GFR  > 50     Lab Results   Component Value Date    GFRAA 62 (L) 12/09/2021                       Recent Outpatient Visits              1 week ago Acute nonintractable headache, unspecified headache type    [place], [facility], [address] [place], BILL    Office Visit    1 week ago Iron deficiency anemia due to chronic blood loss    [address] Visit    1 week ago Newly diagnosed diabetes [facility])    [facility], [facility] Endocrinology BILL [place]    Office Visit    2 weeks ago Iron deficiency anemia due to chronic blood loss    [address] Visit    3 weeks ago Iron deficiency anemia due to chronic blood loss    [address] Visit            Future Appointments         Provider Department Appt Notes    Tomorrow [address] Endocrinology diet education, [facility] pro download    In 2 weeks Carmelo Vo [place] [facility], [facility] 2 6 week follow up     In 3 weeks Abigail Medrano MD [place], [facility] sarcoidosis    In 3 weeks Hannah Sparks, [address] [facility], 59 [facility] 2 Month Follow Up    In 1 month Ayesha Dunbar MD [facility], [facility], [address], [facility]

## 2021-12-16 ENCOUNTER — IMMUNIZATION (OUTPATIENT)
Dept: LAB | Facility: HOSPITAL | Age: 81
End: 2021-12-16
Attending: EMERGENCY MEDICINE
Payer: MEDICARE

## 2021-12-16 ENCOUNTER — NURSE ONLY (OUTPATIENT)
Dept: ENDOCRINOLOGY CLINIC | Facility: CLINIC | Age: 81
End: 2021-12-16
Payer: MEDICARE

## 2021-12-16 DIAGNOSIS — Z23 NEED FOR VACCINATION: Primary | ICD-10-CM

## 2021-12-16 PROCEDURE — 95251 CONT GLUC MNTR ANALYSIS I&R: CPT | Performed by: NURSE PRACTITIONER

## 2021-12-16 PROCEDURE — 0004A SARSCOV2 VAC 30MCG/0.3ML IM: CPT

## 2021-12-16 RX ORDER — GLIPIZIDE 5 MG/1
5 TABLET ORAL
Qty: 30 TABLET | Refills: 2 | Status: SHIPPED | OUTPATIENT
Start: 2021-12-16 | End: 2022-01-11

## 2021-12-16 NOTE — PROGRESS NOTES
Professional van download reviewed with Danyellna Comes at time of visit and post prandial hyperglycemia noted with meals occurring most often with 1st and last meals of the day. Hypoglycemia noted overnight with readings in 50s and higher.      Patient reports

## 2021-12-16 NOTE — PROGRESS NOTES
Consuelo Russo  : 1940 was seen for Individual Continuous Glucose Sensor Follow-up Visit:    Date: 2021  Start time: 1:25 pm End time: 2:10      Sensor Type:Joselyn   Site Assessment: left arm clean, dry and intact    Reviewed CGMS do

## 2021-12-16 NOTE — PATIENT INSTRUCTIONS
1. Check Sensor at least 3 times per day. 2. Take new medication Glipizide with first meal of the day    3.  Stop taking Glimepiride

## 2021-12-27 ENCOUNTER — TELEPHONE (OUTPATIENT)
Dept: ENDOCRINOLOGY CLINIC | Facility: CLINIC | Age: 81
End: 2021-12-27

## 2021-12-27 ENCOUNTER — OFFICE VISIT (OUTPATIENT)
Dept: FAMILY MEDICINE CLINIC | Facility: CLINIC | Age: 81
End: 2021-12-27
Payer: MEDICARE

## 2021-12-27 VITALS
SYSTOLIC BLOOD PRESSURE: 134 MMHG | DIASTOLIC BLOOD PRESSURE: 78 MMHG | RESPIRATION RATE: 20 BRPM | HEART RATE: 84 BPM | OXYGEN SATURATION: 98 % | TEMPERATURE: 98 F

## 2021-12-27 DIAGNOSIS — J98.8 CONGESTION OF UPPER AIRWAY: ICD-10-CM

## 2021-12-27 DIAGNOSIS — Z20.822 EXPOSURE TO CONFIRMED CASE OF COVID-19: Primary | ICD-10-CM

## 2021-12-27 PROCEDURE — 99203 OFFICE O/P NEW LOW 30 MIN: CPT

## 2021-12-27 NOTE — PROGRESS NOTES
CHIEF COMPLAINT:   Patient presents with:  Covid-19 Test: HA, congestion  Brought here by daughter  HPI:   Marcos Clay is a 79year old female who presents with symptoms as described below for 2 days.        • Fever:     Yes []     No [x] OneTouch Delica Lancets 45J Does not apply Misc 4 x daily 100 each 1   • predniSONE 1 MG Oral Tab Take 3 tablets (3 mg total) by mouth daily with breakfast. Patient takes 3 tablets by mouth daily.  90 tablet 1   • sulfaSALAzine 500 MG Oral Tab With meals, 5 salpingo-oophorectomy. • IR ANEURYSM REPAIRM  2010    Computer assisted volumetric craniofomy with clipping of anterior cerebral artery.    • ROTATOR CUFF REPAIR      1993   • TOTAL KNEE REPLACEMENT Right 2010   • TOTAL KNEE REPLACEMENT Left 07/02/2015 diagnosis)  Congestion of upper airway    Meds & Refills for this Visit:  Requested Prescriptions      No prescriptions requested or ordered in this encounter       PLAN:      COVID-19 testing ordered.  Encouraged patient to call clinic or covid hotline if indoors, if you tested 3-5 days after exposure and you tested negative, you may discontinue wearing a mask indoors  • We recommend that you continue to wear a mask indoors and when unable to socially distance more than 6 feet regardless of vaccination stat

## 2021-12-27 NOTE — TELEPHONE ENCOUNTER
Daughter states pt was exposed to covid and is currently taking a covid test. Pt was scheduled to see [place] tomorrow. Daughter states pt was supposed to have monitor removed and inquiring when pt should reschedule appt.  Please call [phone]

## 2021-12-28 NOTE — PROGRESS NOTES
CHIEF COMPLAINT:   Patient presents with:  Covid-19 Test: HA, congestion    HPI:   Morris Rincon is a 79year old female who presents with symptoms as described below for 2 days.        • Fever:     Yes []     No [x]       • Cough:    Yes [x] OneTouch Delica Lancets 15Z Does not apply Misc 4 x daily 100 each 1   • predniSONE 1 MG Oral Tab Take 3 tablets (3 mg total) by mouth daily with breakfast. Patient takes 3 tablets by mouth daily.  90 tablet 1   • sulfaSALAzine 500 MG Oral Tab With meals, 5 salpingo-oophorectomy. • IR ANEURYSM REPAIRM  2010    Computer assisted volumetric craniofomy with clipping of anterior cerebral artery.    • ROTATOR CUFF REPAIR      1993   • TOTAL KNEE REPLACEMENT Right 2010   • TOTAL KNEE REPLACEMENT Left 07/02/2015 of covid-19  (primary encounter diagnosis)  Congestion of upper airway    Meds & Refills for this Visit:  Requested Prescriptions      No prescriptions requested or ordered in this encounter       PLAN:     COVID-19 testing ordered.  Encouraged patient to c vaccinated - Continue to wear a mask indoors, if you tested 3-5 days after exposure and you tested negative, you may discontinue wearing a mask indoors  • We recommend that you continue to wear a mask indoors and when unable to socially distance more than

## 2021-12-28 NOTE — PATIENT INSTRUCTIONS
• If you have any questions or concerns please contact our answering service at [phone] and we will return your phone call as soon as possible.    • Results for covid testing can vary from 2-3 days  • If you have not received results by end of 3 days p

## 2021-12-29 ENCOUNTER — TELEPHONE (OUTPATIENT)
Dept: INTERNAL MEDICINE CLINIC | Facility: CLINIC | Age: 81
End: 2021-12-29

## 2021-12-29 DIAGNOSIS — E11.9 TYPE 2 DIABETES MELLITUS WITHOUT COMPLICATION, WITHOUT LONG-TERM CURRENT USE OF INSULIN (HCC): Primary | ICD-10-CM

## 2021-12-29 DIAGNOSIS — Z20.828 EXPOSURE TO SARS-ASSOCIATED CORONAVIRUS: Primary | ICD-10-CM

## 2021-12-29 RX ORDER — CYCLOBENZAPRINE HCL 10 MG
10 TABLET ORAL NIGHTLY
Qty: 90 TABLET | Refills: 1 | Status: SHIPPED | OUTPATIENT
Start: 2021-12-29

## 2021-12-29 NOTE — TELEPHONE ENCOUNTER
Patient and daughter ( on MANUEL ) calling, identified name and , was exposed to Ramona on     Patient was test on Monday with PCR and was negative , did have diarrhea and headache on  Monday     Patient/daughter  informed NOT to be COVID tested until

## 2021-12-29 NOTE — TELEPHONE ENCOUNTER
Left message to call back. Patient had appointment on 12/28 with Martin Winkler that was cancelled. Martin Winkler is currently doing virtual visits. Please assist with rescheduling.

## 2021-12-29 NOTE — TELEPHONE ENCOUNTER
Daughter is returning the call Thomas Perez, I advised about the previous message but she is still requesting to speak to Rn about this.  Rn not available please call #[phone]

## 2021-12-30 ENCOUNTER — LAB ENCOUNTER (OUTPATIENT)
Dept: LAB | Facility: HOSPITAL | Age: 81
End: 2021-12-30
Attending: INTERNAL MEDICINE
Payer: MEDICARE

## 2021-12-30 DIAGNOSIS — Z20.828 EXPOSURE TO SARS-ASSOCIATED CORONAVIRUS: ICD-10-CM

## 2022-01-02 LAB — SARS-COV-2 RNA RESP QL NAA+PROBE: NOT DETECTED

## 2022-01-04 ENCOUNTER — TELEPHONE (OUTPATIENT)
Dept: INTERNAL MEDICINE CLINIC | Facility: CLINIC | Age: 82
End: 2022-01-04

## 2022-01-04 ENCOUNTER — TELEPHONE (OUTPATIENT)
Dept: CASE MANAGEMENT | Age: 82
End: 2022-01-04

## 2022-01-04 DIAGNOSIS — D86.9 SARCOIDOSIS: Primary | ICD-10-CM

## 2022-01-04 NOTE — TELEPHONE ENCOUNTER
Patients daughter is requesting earlier appointment with Dr. Herb Dotson. Patient had to r/s follow up appointment in December due to covid exposure. Patient is scheduled with Dr. Herb Dotson on 6/10.

## 2022-01-04 NOTE — TELEPHONE ENCOUNTER
Patients daughter is requesting referral for pulmonologist. Patient has appointment scheduled on 1/6.        Dr. Chevy Alvarado

## 2022-01-04 NOTE — TELEPHONE ENCOUNTER
Dr. Mily Love,    The patient's daughter called requesting referral to Dr. Gloria Best. The daughter provided a DX of sarcoidosis. Pended referral please review diagnosis and sign off if you agree. Thank you.   Geovanny Alcantara

## 2022-01-05 ENCOUNTER — TELEPHONE (OUTPATIENT)
Dept: INTERNAL MEDICINE CLINIC | Facility: CLINIC | Age: 82
End: 2022-01-05

## 2022-01-05 ENCOUNTER — TELEPHONE (OUTPATIENT)
Dept: CASE MANAGEMENT | Age: 82
End: 2022-01-05

## 2022-01-05 DIAGNOSIS — Z12.11 COLON CANCER SCREENING: Primary | ICD-10-CM

## 2022-01-05 DIAGNOSIS — M06.9 RHEUMATOID ARTHRITIS, INVOLVING UNSPECIFIED SITE, UNSPECIFIED WHETHER RHEUMATOID FACTOR PRESENT (HCC): ICD-10-CM

## 2022-01-05 NOTE — TELEPHONE ENCOUNTER
Patient needs referrals to see specialist:    Dr. Cecily Gardner, Dr. Lyric Greer, and Dr. Nirav Baxter, appointments are coming this week

## 2022-01-05 NOTE — TELEPHONE ENCOUNTER
Patient's daughter May Liang  is requesting to schedule an initial appointment, [place] and is seeking a new PCP and referral for pulmonologist.   Can CSS use a RES 24 slot?       Please call May Liang for sc no no no no no no no no no no no no no no no no no no no no no no no no

## 2022-01-05 NOTE — TELEPHONE ENCOUNTER
The patient has new insurance for 2022. Please call the daughter or patient regarding new insurance plan for 2022. The insurance is [employer].     Thank you  Stacie Gaona  Referral Department

## 2022-01-06 ENCOUNTER — OFFICE VISIT (OUTPATIENT)
Dept: PULMONOLOGY | Facility: CLINIC | Age: 82
End: 2022-01-06
Payer: MEDICARE

## 2022-01-06 VITALS
WEIGHT: 157 LBS | DIASTOLIC BLOOD PRESSURE: 88 MMHG | RESPIRATION RATE: 14 BRPM | HEIGHT: 61 IN | OXYGEN SATURATION: 97 % | HEART RATE: 105 BPM | BODY MASS INDEX: 29.64 KG/M2 | SYSTOLIC BLOOD PRESSURE: 138 MMHG

## 2022-01-06 DIAGNOSIS — R05.9 COUGH: ICD-10-CM

## 2022-01-06 DIAGNOSIS — D86.9 SARCOIDOSIS: Primary | ICD-10-CM

## 2022-01-06 PROCEDURE — 99203 OFFICE O/P NEW LOW 30 MIN: CPT | Performed by: INTERNAL MEDICINE

## 2022-01-06 NOTE — PROGRESS NOTES
Dear Timothy Bonilla:           As you know, Rolando Jules is an 78-year-old female who I am now evaluating for possible diagnosis of sarcoidosis. HISTORY OF PRESENT ILLNESS: The patient recently lost her . She had been living in [facility].   She h light and accommodation. Neck without adenopathy, thyromegaly, JVD nor bruit. Lungs clear to auscultation and percussion. Cardiac regular rate and rhythm no murmur. Abdomen nontender, without hepatosplenomegaly and no mass appreciable.  Extremities and Musc

## 2022-01-07 ENCOUNTER — NURSE TRIAGE (OUTPATIENT)
Dept: INTERNAL MEDICINE CLINIC | Facility: CLINIC | Age: 82
End: 2022-01-07

## 2022-01-07 NOTE — TELEPHONE ENCOUNTER
Daughter indicated that patient has rheumatoid arthritis and her joints have been hurting. For the past 1 week now both of her hands and feet are swollen. No shortness of breath or chest pain. No other symptoms.  Patient has an appointment with Rheumatologi

## 2022-01-07 NOTE — TELEPHONE ENCOUNTER
Has she called the rheumatologist and to asked him about options? They may want to give her something.

## 2022-01-10 ENCOUNTER — OFFICE VISIT (OUTPATIENT)
Dept: RHEUMATOLOGY | Facility: CLINIC | Age: 82
End: 2022-01-10
Payer: MEDICARE

## 2022-01-10 ENCOUNTER — TELEPHONE (OUTPATIENT)
Dept: ENDOCRINOLOGY CLINIC | Facility: CLINIC | Age: 82
End: 2022-01-10

## 2022-01-10 ENCOUNTER — TELEPHONE (OUTPATIENT)
Dept: RHEUMATOLOGY | Facility: CLINIC | Age: 82
End: 2022-01-10

## 2022-01-10 VITALS
BODY MASS INDEX: 29.64 KG/M2 | DIASTOLIC BLOOD PRESSURE: 95 MMHG | HEART RATE: 111 BPM | SYSTOLIC BLOOD PRESSURE: 192 MMHG | HEIGHT: 61 IN | WEIGHT: 157 LBS

## 2022-01-10 DIAGNOSIS — M05.79 RHEUMATOID ARTHRITIS INVOLVING MULTIPLE SITES WITH POSITIVE RHEUMATOID FACTOR (HCC): Primary | ICD-10-CM

## 2022-01-10 DIAGNOSIS — Z51.81 MEDICATION MONITORING ENCOUNTER: ICD-10-CM

## 2022-01-10 PROCEDURE — 3008F BODY MASS INDEX DOCD: CPT | Performed by: INTERNAL MEDICINE

## 2022-01-10 PROCEDURE — 3080F DIAST BP >= 90 MM HG: CPT | Performed by: INTERNAL MEDICINE

## 2022-01-10 PROCEDURE — 3077F SYST BP >= 140 MM HG: CPT | Performed by: INTERNAL MEDICINE

## 2022-01-10 PROCEDURE — 99214 OFFICE O/P EST MOD 30 MIN: CPT | Performed by: INTERNAL MEDICINE

## 2022-01-10 RX ORDER — PREDNISONE 10 MG/1
TABLET ORAL
Qty: 20 TABLET | Refills: 0 | Status: SHIPPED | OUTPATIENT
Start: 2022-01-10 | End: 2022-01-28 | Stop reason: ALTCHOICE

## 2022-01-10 RX ORDER — PREDNISONE 1 MG/1
5 TABLET ORAL DAILY
Qty: 90 TABLET | Refills: 0 | Status: SHIPPED | OUTPATIENT
Start: 2022-01-10

## 2022-01-10 RX ORDER — LEFLUNOMIDE 20 MG/1
20 TABLET ORAL DAILY
Qty: 90 TABLET | Refills: 0 | Status: SHIPPED | OUTPATIENT
Start: 2022-01-10

## 2022-01-10 NOTE — PATIENT INSTRUCTIONS
You were seen today for rheumatoid arthritis, currently having a flare  Take prednisone 30 mg daily for 3 days then 20 mg daily for 3 days then 10 mg daily for 3 days and then start 5 mg daily  Start leflunomide 20 mg daily  Blood work in 6 weeks  Follow-u

## 2022-01-10 NOTE — PROGRESS NOTES
Javier Olivo is a 79year old female. HPI:   Patient presents with: Follow - Up  Joint Pain  Swelling  Medication Follow-Up      I had the pleasure of seeing Javier Olivo on 1/10/2022 for follow up RA.      Current medications: now she is tapered down to 3 mg daily. She has an appointment with pulmonology in January. Do not have records of her chest x-ray or CT scan. Cough has resolved. Has minimal shortness of breath.     Today, 1/10/2022:   Presents for follow-up of rheumato (BLOOD GLUCOSE SYSTEM GILLES) Does not apply Kit DX E 11.9. Checks every morning. 1 kit 0   • Lancets Does not apply Misc DX E 11.9. Checks every morning. 50 each 11   • Blood Glucose Monitoring Suppl w/Device Does not apply Kit DX E 11.9.   Checks every mor pain, GLORY test negative b/l  Knees: FROM, no warmth or effusion present. No pain with ROM.    Ankles: B/L ankle swelling  Feet: no pain with MTP squeeze, no toe swelling or pain or warmth on palpation with FROM  Spine: no lumbar or sacral pain on palpatio

## 2022-01-10 NOTE — TELEPHONE ENCOUNTER
Future Appointments   Date Time Provider Zehra Carolyn   1/10/2022 12:00 PM Barrett Montero MD [address] [facility]   1/25/2022  1:30 PM [facility] [place]   1/28/2022 10:00 AM Delicia Capone MD [facility]   2/11/20

## 2022-01-10 NOTE — TELEPHONE ENCOUNTER
Patient's daughter came in. Patient appt was canceled on 12/28/21. Patient has been trying to get a new appointment scheduled but has not been able to get through to anyone. Patient would also need to have her device downloaded for the next appointment.  Sh

## 2022-01-10 NOTE — TELEPHONE ENCOUNTER
Patient has a new insurance [facility]. Patient will need a new referral sent for Endocrinology with [facility] for 2022. Please send per patient's insurance. Thank you.

## 2022-01-10 NOTE — TELEPHONE ENCOUNTER
Dr. Miguel Choe, patient has appointment today, but reached out on the 7th. See TE 1/7/22     Ollen Peabody, RN     KW    2:31 PM  Note  Daughter indicated that patient has rheumatoid arthritis and her joints have been hurting.  For the past 1 week now both

## 2022-01-11 RX ORDER — GLIPIZIDE 5 MG/1
TABLET ORAL
Qty: 30 TABLET | Refills: 2 | Status: SHIPPED | OUTPATIENT
Start: 2022-01-11

## 2022-01-11 NOTE — TELEPHONE ENCOUNTER
Requested Prescriptions     Pending Prescriptions Disp Refills   • GLIPIZIDE 5 MG Oral Tab [Pharmacy Med Name: GLIPIZIDE 5 MG TABLET] 30 tablet 2     Sig: TAKE 1 TABLET BY MOUTH EVERY MORNING BEFORE BREAKFAST.      LF: 12/16/21 #30 TAB W/ 2 RF  LOV: 12/6/21 BUN/CREATININE RATIO      10.0 - 20.0 19.0   CALCIUM      8.5 - 10.1 mg/dL 8.9   CALCULATED OSMOLALITY      275 - 295 mOsm/kg 296 (H)   eGFR NON-AFR.  AMERICAN      >=60 50 (L)   eGFR       >=60 57 (L)        Return in about 2 months (arou

## 2022-01-13 RX ORDER — PREDNISONE 1 MG/1
3 TABLET ORAL
Qty: 90 TABLET | Refills: 1 | OUTPATIENT
Start: 2022-01-13

## 2022-01-13 NOTE — TELEPHONE ENCOUNTER
Current Outpatient Medications   Medication Sig Dispense Refill   • predniSONE 1 MG Oral Tab Take 3 tablets (3 mg total) by mouth daily with breakfast. Patient takes 3 tablets by mouth daily.  90 tablet 1

## 2022-01-13 NOTE — TELEPHONE ENCOUNTER
She was seen recently.   She should not need prednisone 1 mg daily as a part in a higher dose with tapering down to 5 mg daily

## 2022-01-13 NOTE — TELEPHONE ENCOUNTER
LOV: 01/10/2022  Future Appointments   Date Time Provider Zehra Mendosai   1/25/2022  1:30 PM MD Demetria Ugalde   1/28/2022 10:00 AM Wisam Driscoll MD [address]   2/11/2022 10:00 AM Wisam Driscoll MD [facility]

## 2022-01-17 ENCOUNTER — PATIENT MESSAGE (OUTPATIENT)
Dept: ENDOCRINOLOGY CLINIC | Facility: CLINIC | Age: 82
End: 2022-01-17

## 2022-01-18 NOTE — TELEPHONE ENCOUNTER
From: Sylvia Matrinez  To: BILL Leon  Sent: 1/17/2022 5:16 PM CST  Subject: Follow Up Visit    Cade Ms. Thakkar,  I have been trying to get a follow up appointment for my mom.  I left messages and I also came by the office on Jan 6th to tr

## 2022-01-24 NOTE — TELEPHONE ENCOUNTER
Called and spoke to patient's daughter Guilherme Chambers. She is wondering if there could be a [place] to [employer] report, states her mom got the sensor taken off weeks ago. I asked if she had the reader and states she uses her phone to check.  She will send a

## 2022-01-25 NOTE — TELEPHONE ENCOUNTER
Ok to offer CDE appt tomorrow or Thursday this week for download of sensor since APRN appt was rescheduled.

## 2022-01-25 NOTE — TELEPHONE ENCOUNTER
Called patient's daughter Briana Cunningham and relayed below message as outlined. She agreed for them to come in tomorrow after patient's CT scan.       Future Appointments   Date Time Provider Zehra Leon   1/26/2022  2:00 PM [facility] ENDO CDE ECCFHENDO EC CF

## 2022-01-26 ENCOUNTER — HOSPITAL ENCOUNTER (OUTPATIENT)
Dept: CT IMAGING | Facility: HOSPITAL | Age: 82
Discharge: HOME | End: 2022-01-26
Attending: INTERNAL MEDICINE
Payer: MEDICARE

## 2022-01-26 ENCOUNTER — NURSE ONLY (OUTPATIENT)
Dept: ENDOCRINOLOGY CLINIC | Facility: CLINIC | Age: 82
End: 2022-01-26
Payer: MEDICARE

## 2022-01-26 DIAGNOSIS — R05.9 COUGH: ICD-10-CM

## 2022-01-26 DIAGNOSIS — E11.65 UNCONTROLLED TYPE 2 DIABETES MELLITUS WITH HYPERGLYCEMIA (HCC): Primary | ICD-10-CM

## 2022-01-26 PROCEDURE — 99211 OFF/OP EST MAY X REQ PHY/QHP: CPT | Performed by: NURSE PRACTITIONER

## 2022-01-26 RX ORDER — LANCETS
EACH MISCELLANEOUS
Qty: 100 EACH | Refills: 2 | Status: SHIPPED | OUTPATIENT
Start: 2022-01-26

## 2022-01-26 RX ORDER — BLOOD SUGAR DIAGNOSTIC
1 STRIP MISCELLANEOUS DAILY
Qty: 100 STRIP | Refills: 2 | Status: SHIPPED | OUTPATIENT
Start: 2022-01-26

## 2022-01-26 NOTE — TELEPHONE ENCOUNTER
[id number] Anupama Heath noted. Agree with CDE apt. for tomorrow. Irvin Coles, depending on her readings tomorrow, ok to reschedule apt for a sooner date with you. Thank you!

## 2022-01-27 NOTE — PROGRESS NOTES
Maciej Dykes  DOB2/21/1940 was seen for Continuous Glucose Sensor Follow-up Visit: Erlin Keller 2    Date: 1/27/2022    Referring Provider: BILL Piedra    Start time: 2:00 pm  End time: 2:35 pm    Sensor Type: Freestyle Clement Clam rescheduled appt with APRN in February    Patient verbalized understanding and has no further questions at this time.     BILL Garces

## 2022-01-28 ENCOUNTER — HOSPITAL ENCOUNTER (OUTPATIENT)
Dept: ULTRASOUND IMAGING | Facility: HOSPITAL | Age: 82
Discharge: HOME | End: 2022-01-28
Attending: INTERNAL MEDICINE
Payer: MEDICARE

## 2022-01-28 ENCOUNTER — OFFICE VISIT (OUTPATIENT)
Dept: INTERNAL MEDICINE CLINIC | Facility: CLINIC | Age: 82
End: 2022-01-28
Payer: MEDICARE

## 2022-01-28 ENCOUNTER — HOSPITAL ENCOUNTER (OUTPATIENT)
Dept: GENERAL RADIOLOGY | Facility: HOSPITAL | Age: 82
Discharge: HOME | End: 2022-01-28
Attending: INTERNAL MEDICINE
Payer: MEDICARE

## 2022-01-28 ENCOUNTER — HOSPITAL ENCOUNTER (OUTPATIENT)
Dept: CT IMAGING | Facility: HOSPITAL | Age: 82
End: 2022-01-28
Attending: INTERNAL MEDICINE
Payer: MEDICARE

## 2022-01-28 VITALS
HEART RATE: 96 BPM | DIASTOLIC BLOOD PRESSURE: 92 MMHG | WEIGHT: 154.19 LBS | HEIGHT: 61 IN | BODY MASS INDEX: 29.11 KG/M2 | TEMPERATURE: 97 F | SYSTOLIC BLOOD PRESSURE: 194 MMHG | RESPIRATION RATE: 18 BRPM | OXYGEN SATURATION: 96 %

## 2022-01-28 DIAGNOSIS — I10 PRIMARY HYPERTENSION: ICD-10-CM

## 2022-01-28 DIAGNOSIS — M79.605 PAIN OF LEFT LOWER EXTREMITY: ICD-10-CM

## 2022-01-28 DIAGNOSIS — E11.9 TYPE 2 DIABETES MELLITUS WITHOUT COMPLICATION, WITHOUT LONG-TERM CURRENT USE OF INSULIN (HCC): ICD-10-CM

## 2022-01-28 DIAGNOSIS — E78.5 DYSLIPIDEMIA: ICD-10-CM

## 2022-01-28 DIAGNOSIS — E83.52 HYPERCALCEMIA: ICD-10-CM

## 2022-01-28 DIAGNOSIS — Z71.85 VACCINE COUNSELING: Primary | ICD-10-CM

## 2022-01-28 DIAGNOSIS — D86.9 SARCOIDOSIS: ICD-10-CM

## 2022-01-28 DIAGNOSIS — B19.10 HEPATITIS B INFECTION WITHOUT DELTA AGENT WITHOUT HEPATIC COMA, UNSPECIFIED CHRONICITY: ICD-10-CM

## 2022-01-28 DIAGNOSIS — D50.0 IRON DEFICIENCY ANEMIA DUE TO CHRONIC BLOOD LOSS: ICD-10-CM

## 2022-01-28 PROBLEM — R05.9 COUGH: Status: RESOLVED | Noted: 2021-11-05 | Resolved: 2022-01-28

## 2022-01-28 PROCEDURE — 73502 X-RAY EXAM HIP UNI 2-3 VIEWS: CPT | Performed by: INTERNAL MEDICINE

## 2022-01-28 PROCEDURE — 93971 EXTREMITY STUDY: CPT | Performed by: INTERNAL MEDICINE

## 2022-01-28 PROCEDURE — 3008F BODY MASS INDEX DOCD: CPT | Performed by: INTERNAL MEDICINE

## 2022-01-28 PROCEDURE — 3077F SYST BP >= 140 MM HG: CPT | Performed by: INTERNAL MEDICINE

## 2022-01-28 PROCEDURE — 3080F DIAST BP >= 90 MM HG: CPT | Performed by: INTERNAL MEDICINE

## 2022-01-28 PROCEDURE — 99215 OFFICE O/P EST HI 40 MIN: CPT | Performed by: INTERNAL MEDICINE

## 2022-01-28 RX ORDER — TRAMADOL HYDROCHLORIDE 50 MG/1
50 TABLET ORAL EVERY 6 HOURS PRN
Qty: 20 TABLET | Refills: 0 | Status: SHIPPED | OUTPATIENT
Start: 2022-01-28

## 2022-01-28 NOTE — PATIENT INSTRUCTIONS
ASSESSMENT/PLAN:   Vaccine counseling  (primary encounter diagnosis) Check on shingrix coverage. Type 2 diabetes mellitus without complication, without long-term current use of insulin (hcc) Better. SEes endoc. Check blood and urine.  Careful with die seems to have worn away. Deep ulcers can lead to bone infection. Gangrene is the most serious foot complication of diabetes. It usually occurs on the tips of the toes as blackened areas of skin. The black area is dead tissue.  In severe cases, gangrene spr medical advice  Call your healthcare provider right away if any of these occur:  · Black skin color anywhere on the foot  · Open ulcer with pus draining from the wound  · Increasing foot or leg pain  · New areas of redness or swelling or tender areas of th carbohydrates (“carbs”) and other foods you eat each day. They are also powerful tools for managing your weight. Servings and portions  Many different words are used to describe amounts of food.  If your health care provider uses a term you’re not sure of, Reviewed: 3/1/2016  © 3515-3087 [employer] [address]. [address] [facility], [facility] [place]. All rights reserved. This information is not intended as a substitute for professional medical care.  Always follow your healthcare professional's instruct Shingles (Herpes Zoster)     Talk to your healthcare provider about the shingles vaccine. Shingles is also called herpes zoster. It's a painful skin rash caused by the herpes zoster virus. This is the same virus that causes chickenpox.  After a person medicines are most often only prescribed if you are seen by a healthcare provider within the first 72 hours of having the rash. But antiviral medicines may be prescribed even after 72 hours if someone's immune system is weak.  Or if the infection is extensi of these:  · New symptoms or symptoms that don’t go away with treatment  · A rash or blisters near your eye  · More drainage, fever, or rash after treatment  · Severe pain that doesn’t go away  How can shingles be prevented?   You can only get shingles if y substitute for professional medical care. Always follow your healthcare professional's instructions. Text SUPPORT1 to 76258 to learn if you may be eligible for financial support with your medication(s). Msg & Data Rates May Apply.  Msg freq varies care provider before I take this medicine?   They need to know if you have any of these conditions:  · blood disorders or disease  · cancer like leukemia or lymphoma  · immune system problems or therapy  · an unusual or allergic reaction to vaccines, other

## 2022-01-28 NOTE — PROGRESS NOTES
HPI:    Patient ID: Shiva Villagomez is a 79year old female. Patient presents with: Follow - Up: 2 months  Pain: left leg, knee and groin. Swelling Edema: right foot. Patient here for follow up of Diabetes.   Has been taking medications re Component Value Date/Time    CHOLEST 221 (H) 11/07/2021 12:39 PM    HDL 61 (H) 11/07/2021 12:39 PM    TRIG 166 (H) 11/07/2021 12:39 PM     (H) 11/07/2021 12:39 PM    NONHDLC 160 (H) 11/07/2021 12:39 PM            Wt Readings from Last 3 Encounters rheumatoid arthritis. Her past medical history is significant for diabetes and osteoarthritis. There is no history of gout or rheumatoid arthritis.          Review of Systems   Constitutional: Negative for activity change, appetite change, chills, diaphores 90 tablet 0   • cyclobenzaprine 10 MG Oral Tab Take 1 tablet (10 mg total) by mouth nightly. Patient is taking one tablet by mouth nightly as needed for muscle spasms.  90 tablet 1   • spironolactone 25 MG Oral Tab Take 1 tablet (25 mg total) by mouth daily daily. (Patient not taking: No sig reported)       Allergies:  Celebrex [Celecoxib]    PALPITATIONS  Penicillins             ITCHING, SWELLING  Metformin And Relat*    UNKNOWN  Olmesartan              UNKNOWN    HISTORY:  Past Medical History:   Diagnosis kg)  01/06/22 : 157 lb (71.2 kg)      Physical Exam  Vitals and nursing note reviewed. Constitutional:       General: She is not in acute distress. Appearance: Normal appearance. She is well-developed and well-groomed.  She is not ill-appearing, toxic Negative right straight leg raise test and negative left straight leg raise test. No scoliosis. Right hip: No deformity, lacerations, tenderness, bony tenderness or crepitus. Normal range of motion. Normal strength.       Left hip: Tenderness and bony Check feet  every AM and careful with sores and ulcers on feet bilaterally. Check eyes every year with dilated eye exam.  Check sugars. 2-hour postmeal should be less than 140s. Pre-meal should be 's. Both equally affected A1c.   Discussed denisse Take 1 tablet (50 mg total) by mouth every 6 (six) hours as needed for Pain.        Imaging & Referrals:  US ABDOMEN LIMITED (CPT=76705)  XR HIP W OR WO PELVIS 2 OR 3 VIEWS, LEFT (CPT=73502)  US VENOUS DOPPLER LEG LEFT - DIAG IMG (CPT=93971)  US ARTERIAL DU

## 2022-02-08 ENCOUNTER — TELEPHONE (OUTPATIENT)
Dept: PULMONOLOGY | Facility: CLINIC | Age: 82
End: 2022-02-08

## 2022-02-08 NOTE — TELEPHONE ENCOUNTER
Spoke with patient's daughter Masha Ryder, informed her of Dr. Mario Richard message.   Daughter verbalized understanding, states they will reschedule CT with contrast.

## 2022-02-08 NOTE — TELEPHONE ENCOUNTER
RN, please call the family member and explained that it would be important to use the contrast to better image the mediastinum which is where the lymph nodes reside in sarcoidosis.

## 2022-02-26 ENCOUNTER — LAB ENCOUNTER (OUTPATIENT)
Dept: LAB | Facility: HOSPITAL | Age: 82
End: 2022-02-26
Attending: INTERNAL MEDICINE
Payer: MEDICARE

## 2022-02-26 DIAGNOSIS — B19.10 HEPATITIS B INFECTION WITHOUT DELTA AGENT WITHOUT HEPATIC COMA, UNSPECIFIED CHRONICITY: ICD-10-CM

## 2022-02-26 DIAGNOSIS — M05.79 RHEUMATOID ARTHRITIS INVOLVING MULTIPLE SITES WITH POSITIVE RHEUMATOID FACTOR (HCC): ICD-10-CM

## 2022-02-26 DIAGNOSIS — Z51.81 MEDICATION MONITORING ENCOUNTER: ICD-10-CM

## 2022-02-26 DIAGNOSIS — E11.9 TYPE 2 DIABETES MELLITUS WITHOUT COMPLICATION, WITHOUT LONG-TERM CURRENT USE OF INSULIN (HCC): ICD-10-CM

## 2022-02-26 DIAGNOSIS — D50.0 IRON DEFICIENCY ANEMIA DUE TO CHRONIC BLOOD LOSS: ICD-10-CM

## 2022-02-26 LAB
AFP-TM SERPL-MCNC: 4.2 NG/ML (ref ?–8)
ALBUMIN SERPL-MCNC: 3.1 G/DL (ref 3.4–5)
ALBUMIN/GLOB SERPL: 0.8 {RATIO} (ref 1–2)
ALP LIVER SERPL-CCNC: 78 U/L
ALT SERPL-CCNC: 11 U/L
ANION GAP SERPL CALC-SCNC: 6 MMOL/L (ref 0–18)
AST SERPL-CCNC: 18 U/L (ref 15–37)
BASOPHILS # BLD AUTO: 0.04 X10(3) UL (ref 0–0.2)
BASOPHILS NFR BLD AUTO: 0.5 %
BILIRUB SERPL-MCNC: 0.3 MG/DL (ref 0.1–2)
BUN BLD-MCNC: 9 MG/DL (ref 7–18)
BUN/CREAT SERPL: 13.8 (ref 10–20)
CALCIUM BLD-MCNC: 8.7 MG/DL (ref 8.5–10.1)
CHLORIDE SERPL-SCNC: 104 MMOL/L (ref 98–112)
CHOLEST SERPL-MCNC: 249 MG/DL (ref ?–200)
CO2 SERPL-SCNC: 27 MMOL/L (ref 21–32)
CREAT BLD-MCNC: 0.65 MG/DL
CRP SERPL-MCNC: 1.19 MG/DL (ref ?–0.3)
DEPRECATED HBV CORE AB SER IA-ACNC: 46.8 NG/ML
DEPRECATED RDW RBC AUTO: 49.6 FL (ref 35.1–46.3)
EOSINOPHIL # BLD AUTO: 0.09 X10(3) UL (ref 0–0.7)
EOSINOPHIL NFR BLD AUTO: 1.1 %
ERYTHROCYTE [DISTWIDTH] IN BLOOD BY AUTOMATED COUNT: 15.2 % (ref 11–15)
ERYTHROCYTE [SEDIMENTATION RATE] IN BLOOD: 80 MM/HR
EST. AVERAGE GLUCOSE BLD GHB EST-MCNC: 174 MG/DL (ref 68–126)
FASTING PATIENT LIPID ANSWER: YES
FASTING STATUS PATIENT QL REPORTED: YES
GLOBULIN PLAS-MCNC: 3.8 G/DL (ref 2.8–4.4)
GLUCOSE BLD-MCNC: 143 MG/DL (ref 70–99)
HAV AB SER QL IA: NONREACTIVE
HBA1C MFR BLD: 7.7 % (ref ?–5.7)
HBV CORE AB SERPL QL IA: REACTIVE
HBV CORE AB SERPL QL IA: REACTIVE
HBV CORE IGM SER QL: NONREACTIVE
HBV SURFACE AB SER QL: REACTIVE
HBV SURFACE AB SER QL: REACTIVE
HBV SURFACE AB SERPL IA-ACNC: 100.96 MIU/ML
HBV SURFACE AB SERPL IA-ACNC: 98.78 MIU/ML
HBV SURFACE AG SER-ACNC: <0.1 [IU]/L
HBV SURFACE AG SERPL QL IA: NONREACTIVE
HBV SURFACE AG SERPL QL IA: NONREACTIVE
HCT VFR BLD AUTO: 37.4 %
HCV AB SERPL QL IA: NONREACTIVE
HDLC SERPL-MCNC: 52 MG/DL (ref 40–59)
HGB BLD-MCNC: 11.5 G/DL
IMM GRANULOCYTES # BLD AUTO: 0.07 X10(3) UL (ref 0–1)
IMM GRANULOCYTES NFR BLD: 0.9 %
IRON SATN MFR SERPL: 15 %
IRON SERPL-MCNC: 56 UG/DL
LDLC SERPL CALC-MCNC: 156 MG/DL (ref ?–100)
LYMPHOCYTES # BLD AUTO: 2.44 X10(3) UL (ref 1–4)
LYMPHOCYTES NFR BLD AUTO: 31.1 %
MCH RBC QN AUTO: 27.4 PG (ref 26–34)
MCHC RBC AUTO-ENTMCNC: 30.7 G/DL (ref 31–37)
MCV RBC AUTO: 89 FL
MONOCYTES # BLD AUTO: 0.67 X10(3) UL (ref 0.1–1)
MONOCYTES NFR BLD AUTO: 8.5 %
NEUTROPHILS # BLD AUTO: 4.54 X10 (3) UL (ref 1.5–7.7)
NEUTROPHILS # BLD AUTO: 4.54 X10(3) UL (ref 1.5–7.7)
NEUTROPHILS NFR BLD AUTO: 57.9 %
NONHDLC SERPL-MCNC: 197 MG/DL (ref ?–130)
OSMOLALITY SERPL CALC.SUM OF ELEC: 285 MOSM/KG (ref 275–295)
PLATELET # BLD AUTO: 284 10(3)UL (ref 150–450)
POTASSIUM SERPL-SCNC: 3.6 MMOL/L (ref 3.5–5.1)
RBC # BLD AUTO: 4.2 X10(6)UL
SODIUM SERPL-SCNC: 137 MMOL/L (ref 136–145)
TIBC SERPL-MCNC: 373 UG/DL (ref 240–450)
TRANSFERRIN SERPL-MCNC: 250 MG/DL (ref 200–360)
TRIGL SERPL-MCNC: 224 MG/DL (ref 30–149)
VLDLC SERPL CALC-MCNC: 44 MG/DL (ref 0–30)
WBC # BLD AUTO: 7.9 X10(3) UL (ref 4–11)

## 2022-02-26 PROCEDURE — 80503 PATH CLIN CONSLTJ SF 5-20: CPT

## 2022-02-26 PROCEDURE — 86704 HEP B CORE ANTIBODY TOTAL: CPT | Performed by: INTERNAL MEDICINE

## 2022-02-26 PROCEDURE — 86706 HEP B SURFACE ANTIBODY: CPT | Performed by: INTERNAL MEDICINE

## 2022-02-26 PROCEDURE — 86708 HEPATITIS A ANTIBODY: CPT | Performed by: INTERNAL MEDICINE

## 2022-02-26 PROCEDURE — 87340 HEPATITIS B SURFACE AG IA: CPT

## 2022-02-26 PROCEDURE — 86140 C-REACTIVE PROTEIN: CPT

## 2022-02-26 PROCEDURE — 83540 ASSAY OF IRON: CPT

## 2022-02-26 PROCEDURE — 36415 COLL VENOUS BLD VENIPUNCTURE: CPT

## 2022-02-26 PROCEDURE — 87340 HEPATITIS B SURFACE AG IA: CPT | Performed by: INTERNAL MEDICINE

## 2022-02-26 PROCEDURE — 85652 RBC SED RATE AUTOMATED: CPT

## 2022-02-26 PROCEDURE — 85025 COMPLETE CBC W/AUTO DIFF WBC: CPT

## 2022-02-26 PROCEDURE — 83036 HEMOGLOBIN GLYCOSYLATED A1C: CPT

## 2022-02-26 PROCEDURE — 80503 PATH CLIN CONSLTJ SF 5-20: CPT | Performed by: INTERNAL MEDICINE

## 2022-02-26 PROCEDURE — 82105 ALPHA-FETOPROTEIN SERUM: CPT | Performed by: INTERNAL MEDICINE

## 2022-02-26 PROCEDURE — 80061 LIPID PANEL: CPT

## 2022-02-26 PROCEDURE — 84466 ASSAY OF TRANSFERRIN: CPT

## 2022-02-26 PROCEDURE — 86803 HEPATITIS C AB TEST: CPT | Performed by: INTERNAL MEDICINE

## 2022-02-26 PROCEDURE — 86705 HEP B CORE ANTIBODY IGM: CPT

## 2022-02-26 PROCEDURE — 87517 HEPATITIS B DNA QUANT: CPT

## 2022-02-26 PROCEDURE — 80053 COMPREHEN METABOLIC PANEL: CPT

## 2022-02-26 PROCEDURE — 86706 HEP B SURFACE ANTIBODY: CPT

## 2022-02-26 PROCEDURE — 86704 HEP B CORE ANTIBODY TOTAL: CPT

## 2022-02-26 PROCEDURE — 82728 ASSAY OF FERRITIN: CPT

## 2022-02-28 ENCOUNTER — OFFICE VISIT (OUTPATIENT)
Dept: RHEUMATOLOGY | Facility: CLINIC | Age: 82
End: 2022-02-28
Payer: MEDICARE

## 2022-02-28 VITALS
WEIGHT: 147 LBS | DIASTOLIC BLOOD PRESSURE: 97 MMHG | HEART RATE: 106 BPM | BODY MASS INDEX: 27.75 KG/M2 | HEIGHT: 61 IN | SYSTOLIC BLOOD PRESSURE: 170 MMHG

## 2022-02-28 DIAGNOSIS — Z51.81 MEDICATION MONITORING ENCOUNTER: ICD-10-CM

## 2022-02-28 DIAGNOSIS — M05.79 RHEUMATOID ARTHRITIS INVOLVING MULTIPLE SITES WITH POSITIVE RHEUMATOID FACTOR (HCC): Primary | ICD-10-CM

## 2022-02-28 DIAGNOSIS — Z78.9 HEPATITIS B CORE ANTIBODY NEGATIVE: ICD-10-CM

## 2022-02-28 PROCEDURE — 3008F BODY MASS INDEX DOCD: CPT | Performed by: INTERNAL MEDICINE

## 2022-02-28 PROCEDURE — 3077F SYST BP >= 140 MM HG: CPT | Performed by: INTERNAL MEDICINE

## 2022-02-28 PROCEDURE — 99214 OFFICE O/P EST MOD 30 MIN: CPT | Performed by: INTERNAL MEDICINE

## 2022-02-28 PROCEDURE — 3080F DIAST BP >= 90 MM HG: CPT | Performed by: INTERNAL MEDICINE

## 2022-02-28 NOTE — PATIENT INSTRUCTIONS
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  Also the question is do need the treatment for the hepatitis B  Blood work in 2 months  Follow-up in 2 months

## 2022-03-03 LAB
HBV QNT BY NAAT (LOG IU/ML): <1 LOG IU/ML
HBV QNT BY NAAT INTERP: DETECTED

## 2022-03-03 NOTE — PROGRESS NOTES
CBC Normal (white blood cells and platelets), anemia or hemoglobin levels are much better than prior. But still slightly anemic. Iron storage is normal.  Iron levels are normal.  CMP Normal (electrolytes, kidney and liver functions),   Lipid (choilesterol) is worse. Goal LDL should be < 100. Careful with diet. Avoid red meat, shellfish, pork. Try not to erickson foods. Lower carb diet. Exercise is most part at least 30 minutes 3-4 times a week sustained cardiovascular aerobic exercise getting that heart rate going and keeping it going for 30 minutes at a time. If cannot do 30 will start with 10 minutes of brisk walking is good at each week build up 5 minutes more. Recheck lipid in 3 months. If not taking atorvastatin to begin. If taking atorvastatin fswithc to Crestor 20 mg at night. Hemoglobin A1c which is a 3-month average of sugars is worse than prior. Goal is 6.5 or less. Careful with diet and excercise at least 30 minutes 3-4 times a week. Check sugars at different times on different dates. Careful with low sugars. Carry something with you and check sugar if can. Can carry hanh cracker, etc. Decrease carbohydrates. But also, careful with fruits and natural sugars. One serving a day and no more than 1 handful every day. Check feet  every AM and careful with sores and ulcers on feet bilaterally. Check eyes every year with dilated eye exam.  Check sugars. 2-hour postmeal should be less than 140s. Pre-meal should be 's. Both equally affected A1c. Recheck A1c in 3 months. 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 Not active. Thus not contagious. But needs to check blood work every 6 months.   Follow-up with endocrinology or diabetic specialist.

## 2022-03-09 ENCOUNTER — HOSPITAL ENCOUNTER (OUTPATIENT)
Dept: ULTRASOUND IMAGING | Age: 82
Discharge: HOME | End: 2022-03-09
Attending: INTERNAL MEDICINE
Payer: MEDICARE

## 2022-03-09 DIAGNOSIS — B19.10 HEPATITIS B INFECTION WITHOUT DELTA AGENT WITHOUT HEPATIC COMA, UNSPECIFIED CHRONICITY: ICD-10-CM

## 2022-03-09 DIAGNOSIS — M79.605 PAIN OF LEFT LOWER EXTREMITY: ICD-10-CM

## 2022-03-09 PROBLEM — E13.51 PERIPHERAL VASCULAR DISEASE DUE TO SECONDARY DIABETES MELLITUS (HCC): Status: ACTIVE | Noted: 2022-03-09

## 2022-03-09 PROCEDURE — 76700 US EXAM ABDOM COMPLETE: CPT | Performed by: INTERNAL MEDICINE

## 2022-03-09 PROCEDURE — 93925 LOWER EXTREMITY STUDY: CPT | Performed by: INTERNAL MEDICINE

## 2022-03-11 ENCOUNTER — OFFICE VISIT (OUTPATIENT)
Dept: INTERNAL MEDICINE CLINIC | Facility: CLINIC | Age: 82
End: 2022-03-11
Payer: MEDICARE

## 2022-03-11 VITALS
SYSTOLIC BLOOD PRESSURE: 150 MMHG | RESPIRATION RATE: 17 BRPM | WEIGHT: 148 LBS | BODY MASS INDEX: 27.94 KG/M2 | HEIGHT: 61 IN | OXYGEN SATURATION: 97 % | DIASTOLIC BLOOD PRESSURE: 80 MMHG | TEMPERATURE: 97 F | HEART RATE: 77 BPM

## 2022-03-11 DIAGNOSIS — E11.9 TYPE 2 DIABETES MELLITUS WITHOUT COMPLICATION, WITHOUT LONG-TERM CURRENT USE OF INSULIN (HCC): Primary | ICD-10-CM

## 2022-03-11 DIAGNOSIS — Z78.0 POSTMENOPAUSAL: ICD-10-CM

## 2022-03-11 DIAGNOSIS — I10 PRIMARY HYPERTENSION: ICD-10-CM

## 2022-03-11 DIAGNOSIS — E13.51 PERIPHERAL VASCULAR DISEASE DUE TO SECONDARY DIABETES MELLITUS (HCC): ICD-10-CM

## 2022-03-11 DIAGNOSIS — E78.5 DYSLIPIDEMIA: ICD-10-CM

## 2022-03-11 DIAGNOSIS — E83.52 HYPERCALCEMIA: ICD-10-CM

## 2022-03-11 DIAGNOSIS — Z00.00 ADULT GENERAL MEDICAL EXAMINATION: ICD-10-CM

## 2022-03-11 DIAGNOSIS — Z00.00 ENCOUNTER FOR ANNUAL HEALTH EXAMINATION: ICD-10-CM

## 2022-03-11 DIAGNOSIS — D50.0 IRON DEFICIENCY ANEMIA DUE TO CHRONIC BLOOD LOSS: ICD-10-CM

## 2022-03-11 DIAGNOSIS — Z12.11 COLON CANCER SCREENING: ICD-10-CM

## 2022-03-11 DIAGNOSIS — Z71.85 VACCINE COUNSELING: ICD-10-CM

## 2022-03-11 DIAGNOSIS — R19.7 DIARRHEA, UNSPECIFIED TYPE: ICD-10-CM

## 2022-03-11 DIAGNOSIS — C50.911 INFILTRATING DUCTAL CARCINOMA OF RIGHT BREAST, STAGE 1 (HCC): ICD-10-CM

## 2022-03-11 PROCEDURE — 99397 PER PM REEVAL EST PAT 65+ YR: CPT | Performed by: INTERNAL MEDICINE

## 2022-03-11 PROCEDURE — 3077F SYST BP >= 140 MM HG: CPT | Performed by: INTERNAL MEDICINE

## 2022-03-11 PROCEDURE — 3079F DIAST BP 80-89 MM HG: CPT | Performed by: INTERNAL MEDICINE

## 2022-03-11 PROCEDURE — 3008F BODY MASS INDEX DOCD: CPT | Performed by: INTERNAL MEDICINE

## 2022-03-11 PROCEDURE — 90670 PCV13 VACCINE IM: CPT | Performed by: INTERNAL MEDICINE

## 2022-03-11 PROCEDURE — 96160 PT-FOCUSED HLTH RISK ASSMT: CPT | Performed by: INTERNAL MEDICINE

## 2022-03-11 PROCEDURE — G0009 ADMIN PNEUMOCOCCAL VACCINE: HCPCS | Performed by: INTERNAL MEDICINE

## 2022-03-11 PROCEDURE — G0439 PPPS, SUBSEQ VISIT: HCPCS | Performed by: INTERNAL MEDICINE

## 2022-03-11 RX ORDER — VALSARTAN 80 MG/1
80 TABLET ORAL DAILY
Qty: 60 TABLET | Refills: 1 | Status: SHIPPED | OUTPATIENT
Start: 2022-03-11

## 2022-03-11 RX ORDER — SPIRONOLACTONE 25 MG/1
25 TABLET ORAL DAILY
Qty: 90 TABLET | Refills: 1 | Status: SHIPPED | OUTPATIENT
Start: 2022-03-11 | End: 2022-03-14

## 2022-03-12 RX ORDER — SPIRONOLACTONE 25 MG/1
25 TABLET ORAL DAILY
Qty: 90 TABLET | Refills: 1 | OUTPATIENT
Start: 2022-03-12

## 2022-03-14 ENCOUNTER — TELEPHONE (OUTPATIENT)
Dept: INTERNAL MEDICINE CLINIC | Facility: CLINIC | Age: 82
End: 2022-03-14

## 2022-03-14 RX ORDER — SPIRONOLACTONE 25 MG/1
25 TABLET ORAL DAILY
Qty: 90 TABLET | Refills: 1 | Status: SHIPPED | OUTPATIENT
Start: 2022-03-14 | End: 2022-03-15

## 2022-03-15 RX ORDER — SPIRONOLACTONE 25 MG/1
50 TABLET ORAL DAILY
Qty: 180 TABLET | Refills: 1 | Status: SHIPPED | OUTPATIENT
Start: 2022-03-15

## 2022-04-05 ENCOUNTER — PATIENT MESSAGE (OUTPATIENT)
Dept: INTERNAL MEDICINE CLINIC | Facility: CLINIC | Age: 82
End: 2022-04-05

## 2022-04-05 NOTE — TELEPHONE ENCOUNTER
LOV 01/26/22. No f/u. Called to schedule first available. Spoke with Luis Fletcher (daughter) states she will confirm first with pt and call back to schedule.

## 2022-04-07 NOTE — TELEPHONE ENCOUNTER
From: Ayesha Benitez  To: Aquiles Bradshaw MD  Sent: 4/5/2022 8:22 PM CDT  Subject: Arvmichael Bradshaw,  is a referral needed for a Podiatrist? If so, can you recommend one for my mom. Her feet and big toe are hurting at night, thank you.   Sincerely,  Jacqueline Sims 2/21/1940

## 2022-04-08 RX ORDER — PREDNISONE 1 MG/1
5 TABLET ORAL DAILY
Qty: 90 TABLET | Refills: 0 | Status: SHIPPED | OUTPATIENT
Start: 2022-04-08

## 2022-04-11 RX ORDER — GLIPIZIDE 5 MG/1
TABLET ORAL
Qty: 90 TABLET | Refills: 0 | Status: SHIPPED | OUTPATIENT
Start: 2022-04-11

## 2022-04-27 ENCOUNTER — OFFICE VISIT (OUTPATIENT)
Dept: PODIATRY CLINIC | Facility: CLINIC | Age: 82
End: 2022-04-27
Payer: MEDICARE

## 2022-04-27 DIAGNOSIS — G62.9 NEUROPATHY: Primary | ICD-10-CM

## 2022-04-27 PROCEDURE — 99203 OFFICE O/P NEW LOW 30 MIN: CPT | Performed by: PODIATRIST

## 2022-04-28 ENCOUNTER — TELEPHONE (OUTPATIENT)
Dept: INTERNAL MEDICINE CLINIC | Facility: CLINIC | Age: 82
End: 2022-04-28

## 2022-04-29 RX ORDER — ATORVASTATIN CALCIUM 80 MG/1
80 TABLET, FILM COATED ORAL DAILY
Qty: 990 TABLET | Refills: 1 | Status: SHIPPED | OUTPATIENT
Start: 2022-04-29 | End: 2023-10-16

## 2022-05-02 ENCOUNTER — HOSPITAL ENCOUNTER (EMERGENCY)
Facility: HOSPITAL | Age: 82
Discharge: HOME | End: 2022-05-03
Attending: EMERGENCY MEDICINE
Payer: MEDICARE

## 2022-05-02 ENCOUNTER — TELEPHONE (OUTPATIENT)
Dept: RHEUMATOLOGY | Facility: CLINIC | Age: 82
End: 2022-05-02

## 2022-05-02 ENCOUNTER — LAB ENCOUNTER (OUTPATIENT)
Dept: LAB | Age: 82
End: 2022-05-02
Attending: INTERNAL MEDICINE
Payer: MEDICARE

## 2022-05-02 DIAGNOSIS — E78.5 DYSLIPIDEMIA: ICD-10-CM

## 2022-05-02 DIAGNOSIS — D64.9 ANEMIA, UNSPECIFIED TYPE: Primary | ICD-10-CM

## 2022-05-02 DIAGNOSIS — R19.7 DIARRHEA, UNSPECIFIED TYPE: Primary | ICD-10-CM

## 2022-05-02 DIAGNOSIS — Z51.81 MEDICATION MONITORING ENCOUNTER: ICD-10-CM

## 2022-05-02 DIAGNOSIS — R76.8 HEPATITIS B ANTIBODY POSITIVE: ICD-10-CM

## 2022-05-02 DIAGNOSIS — R73.01 IFG (IMPAIRED FASTING GLUCOSE): ICD-10-CM

## 2022-05-02 DIAGNOSIS — M05.79 RHEUMATOID ARTHRITIS INVOLVING MULTIPLE SITES WITH POSITIVE RHEUMATOID FACTOR (HCC): ICD-10-CM

## 2022-05-02 LAB
ALBUMIN SERPL-MCNC: 3.3 G/DL (ref 3.4–5)
ALT SERPL-CCNC: 13 U/L
ANION GAP SERPL CALC-SCNC: 11 MMOL/L (ref 0–18)
ANION GAP SERPL CALC-SCNC: 12 MMOL/L (ref 0–18)
ANTIBODY SCREEN: NEGATIVE
AST SERPL-CCNC: 17 U/L (ref 15–37)
BASOPHILS # BLD AUTO: 0.05 X10(3) UL (ref 0–0.2)
BASOPHILS NFR BLD AUTO: 0.5 %
BUN BLD-MCNC: 14 MG/DL (ref 7–18)
BUN BLD-MCNC: 18 MG/DL (ref 7–18)
BUN/CREAT SERPL: 15.7 (ref 10–20)
BUN/CREAT SERPL: 16.5 (ref 10–20)
CALCIUM BLD-MCNC: 9.1 MG/DL (ref 8.5–10.1)
CALCIUM BLD-MCNC: 9.2 MG/DL (ref 8.5–10.1)
CHLORIDE SERPL-SCNC: 101 MMOL/L (ref 98–112)
CHLORIDE SERPL-SCNC: 104 MMOL/L (ref 98–112)
CHOLEST SERPL-MCNC: 170 MG/DL (ref ?–200)
CO2 SERPL-SCNC: 23 MMOL/L (ref 21–32)
CO2 SERPL-SCNC: 26 MMOL/L (ref 21–32)
CREAT BLD-MCNC: 0.89 MG/DL
CREAT BLD-MCNC: 1.09 MG/DL
CRP SERPL-MCNC: 0.74 MG/DL (ref ?–0.3)
DEPRECATED HBV CORE AB SER IA-ACNC: 14.4 NG/ML
DEPRECATED RDW RBC AUTO: 54 FL (ref 35.1–46.3)
EOSINOPHIL # BLD AUTO: 0.04 X10(3) UL (ref 0–0.7)
EOSINOPHIL NFR BLD AUTO: 0.4 %
ERYTHROCYTE [DISTWIDTH] IN BLOOD BY AUTOMATED COUNT: 18.4 % (ref 11–15)
ERYTHROCYTE [SEDIMENTATION RATE] IN BLOOD: 82 MM/HR
EST. AVERAGE GLUCOSE BLD GHB EST-MCNC: 163 MG/DL (ref 68–126)
FASTING PATIENT LIPID ANSWER: YES
FASTING STATUS PATIENT QL REPORTED: YES
GLUCOSE BLD-MCNC: 147 MG/DL (ref 70–99)
GLUCOSE BLD-MCNC: 308 MG/DL (ref 70–99)
HBA1C MFR BLD: 7.3 % (ref ?–5.7)
HBV CORE AB SERPL QL IA: REACTIVE
HBV CORE IGM SER QL: NONREACTIVE
HBV SURFACE AB SER QL: REACTIVE
HBV SURFACE AB SERPL IA-ACNC: 66.11 MIU/ML
HBV SURFACE AG SER-ACNC: <0.1 [IU]/L
HBV SURFACE AG SERPL QL IA: NONREACTIVE
HCT VFR BLD AUTO: 21.2 %
HDLC SERPL-MCNC: 43 MG/DL (ref 40–59)
HGB BLD-MCNC: 6 G/DL
IGA SERPL-MCNC: 239 MG/DL (ref 70–312)
IMM GRANULOCYTES # BLD AUTO: 0.07 X10(3) UL (ref 0–1)
IMM GRANULOCYTES NFR BLD: 0.7 %
IRON SATN MFR SERPL: 6 %
IRON SERPL-MCNC: 29 UG/DL
LDLC SERPL CALC-MCNC: 92 MG/DL (ref ?–100)
LYMPHOCYTES # BLD AUTO: 0.83 X10(3) UL (ref 1–4)
LYMPHOCYTES NFR BLD AUTO: 8.5 %
MCH RBC QN AUTO: 22.8 PG (ref 26–34)
MCHC RBC AUTO-ENTMCNC: 28.3 G/DL (ref 31–37)
MCV RBC AUTO: 80.6 FL
MONOCYTES # BLD AUTO: 0.43 X10(3) UL (ref 0.1–1)
MONOCYTES NFR BLD AUTO: 4.4 %
NEUTROPHILS # BLD AUTO: 8.37 X10 (3) UL (ref 1.5–7.7)
NEUTROPHILS # BLD AUTO: 8.37 X10(3) UL (ref 1.5–7.7)
NEUTROPHILS NFR BLD AUTO: 85.5 %
NONHDLC SERPL-MCNC: 127 MG/DL (ref ?–130)
OSMOLALITY SERPL CALC.SUM OF ELEC: 291 MOSM/KG (ref 275–295)
OSMOLALITY SERPL CALC.SUM OF ELEC: 300 MOSM/KG (ref 275–295)
PLATELET # BLD AUTO: 441 10(3)UL (ref 150–450)
POTASSIUM SERPL-SCNC: 3.7 MMOL/L (ref 3.5–5.1)
POTASSIUM SERPL-SCNC: 4 MMOL/L (ref 3.5–5.1)
RBC # BLD AUTO: 2.63 X10(6)UL
RH BLOOD TYPE: POSITIVE
SODIUM SERPL-SCNC: 138 MMOL/L (ref 136–145)
SODIUM SERPL-SCNC: 139 MMOL/L (ref 136–145)
TIBC SERPL-MCNC: 456 UG/DL (ref 240–450)
TRANSFERRIN SERPL-MCNC: 306 MG/DL (ref 200–360)
TRIGL SERPL-MCNC: 207 MG/DL (ref 30–149)
VLDLC SERPL CALC-MCNC: 34 MG/DL (ref 0–30)
WBC # BLD AUTO: 9.8 X10(3) UL (ref 4–11)

## 2022-05-02 PROCEDURE — 80048 BASIC METABOLIC PNL TOTAL CA: CPT | Performed by: INTERNAL MEDICINE

## 2022-05-02 PROCEDURE — 84466 ASSAY OF TRANSFERRIN: CPT | Performed by: EMERGENCY MEDICINE

## 2022-05-02 PROCEDURE — 84460 ALANINE AMINO (ALT) (SGPT): CPT

## 2022-05-02 PROCEDURE — 83036 HEMOGLOBIN GLYCOSYLATED A1C: CPT

## 2022-05-02 PROCEDURE — 36430 TRANSFUSION BLD/BLD COMPNT: CPT

## 2022-05-02 PROCEDURE — 82272 OCCULT BLD FECES 1-3 TESTS: CPT

## 2022-05-02 PROCEDURE — 86920 COMPATIBILITY TEST SPIN: CPT

## 2022-05-02 PROCEDURE — 80048 BASIC METABOLIC PNL TOTAL CA: CPT | Performed by: EMERGENCY MEDICINE

## 2022-05-02 PROCEDURE — 83540 ASSAY OF IRON: CPT | Performed by: EMERGENCY MEDICINE

## 2022-05-02 PROCEDURE — 85025 COMPLETE CBC W/AUTO DIFF WBC: CPT | Performed by: EMERGENCY MEDICINE

## 2022-05-02 PROCEDURE — 80503 PATH CLIN CONSLTJ SF 5-20: CPT

## 2022-05-02 PROCEDURE — 87340 HEPATITIS B SURFACE AG IA: CPT

## 2022-05-02 PROCEDURE — 82040 ASSAY OF SERUM ALBUMIN: CPT

## 2022-05-02 PROCEDURE — 99285 EMERGENCY DEPT VISIT HI MDM: CPT

## 2022-05-02 PROCEDURE — 86900 BLOOD TYPING SEROLOGIC ABO: CPT | Performed by: EMERGENCY MEDICINE

## 2022-05-02 PROCEDURE — 36415 COLL VENOUS BLD VENIPUNCTURE: CPT

## 2022-05-02 PROCEDURE — 30233N1 TRANSFUSION OF NONAUTOLOGOUS RED BLOOD CELLS INTO PERIPHERAL VEIN, PERCUTANEOUS APPROACH: ICD-10-PCS | Performed by: EMERGENCY MEDICINE

## 2022-05-02 PROCEDURE — 86364 TISS TRNSGLTMNASE EA IG CLAS: CPT

## 2022-05-02 PROCEDURE — 85025 COMPLETE CBC W/AUTO DIFF WBC: CPT

## 2022-05-02 PROCEDURE — 86258 DGP ANTIBODY EACH IG CLASS: CPT

## 2022-05-02 PROCEDURE — 86706 HEP B SURFACE ANTIBODY: CPT

## 2022-05-02 PROCEDURE — 82784 ASSAY IGA/IGD/IGG/IGM EACH: CPT

## 2022-05-02 PROCEDURE — 86256 FLUORESCENT ANTIBODY TITER: CPT

## 2022-05-02 PROCEDURE — 86140 C-REACTIVE PROTEIN: CPT

## 2022-05-02 PROCEDURE — 85652 RBC SED RATE AUTOMATED: CPT

## 2022-05-02 PROCEDURE — 86850 RBC ANTIBODY SCREEN: CPT | Performed by: EMERGENCY MEDICINE

## 2022-05-02 PROCEDURE — 86901 BLOOD TYPING SEROLOGIC RH(D): CPT | Performed by: EMERGENCY MEDICINE

## 2022-05-02 PROCEDURE — 85060 BLOOD SMEAR INTERPRETATION: CPT

## 2022-05-02 PROCEDURE — 86705 HEP B CORE ANTIBODY IGM: CPT

## 2022-05-02 PROCEDURE — 84450 TRANSFERASE (AST) (SGOT): CPT

## 2022-05-02 PROCEDURE — 36415 COLL VENOUS BLD VENIPUNCTURE: CPT | Performed by: INTERNAL MEDICINE

## 2022-05-02 PROCEDURE — 82728 ASSAY OF FERRITIN: CPT | Performed by: EMERGENCY MEDICINE

## 2022-05-02 PROCEDURE — 87517 HEPATITIS B DNA QUANT: CPT

## 2022-05-02 PROCEDURE — 80061 LIPID PANEL: CPT

## 2022-05-02 PROCEDURE — 86704 HEP B CORE ANTIBODY TOTAL: CPT

## 2022-05-02 NOTE — TELEPHONE ENCOUNTER
I spoke with her daughter Franchesca Kinsey. I was paged with critical lab result. Her hemoglobin and hematocrit are down to 6.0 and 22.1, down from previous 11.5 and 37.4. Prior they had been 8.3 and 26.4. Her mother has been very tired, and feels like she is anemic. She has a history of iron deficiency anemia. Your other labs look stable. Liver tests are fine. Inflammation markers remain elevated. She is on 5 mg of prednisone and leflunomide 20 mg every day for her rheumatoid arthritis. She will take her mother back into the emergency room. She may need blood transfusions.   I will send this communication to Dr. Pooja Bradshaw

## 2022-05-03 ENCOUNTER — APPOINTMENT (OUTPATIENT)
Dept: LAB | Facility: HOSPITAL | Age: 82
End: 2022-05-03
Attending: INTERNAL MEDICINE
Payer: MEDICARE

## 2022-05-03 VITALS
OXYGEN SATURATION: 99 % | TEMPERATURE: 98 F | BODY MASS INDEX: 24.92 KG/M2 | HEART RATE: 75 BPM | RESPIRATION RATE: 19 BRPM | HEIGHT: 61 IN | DIASTOLIC BLOOD PRESSURE: 80 MMHG | WEIGHT: 132 LBS | SYSTOLIC BLOOD PRESSURE: 178 MMHG

## 2022-05-03 LAB
BASOPHILS # BLD AUTO: 0.05 X10(3) UL (ref 0–0.2)
BASOPHILS NFR BLD AUTO: 0.5 %
DEPRECATED RDW RBC AUTO: 55.8 FL (ref 35.1–46.3)
EOSINOPHIL # BLD AUTO: 0.09 X10(3) UL (ref 0–0.7)
EOSINOPHIL NFR BLD AUTO: 0.8 %
ERYTHROCYTE [DISTWIDTH] IN BLOOD BY AUTOMATED COUNT: 18.6 % (ref 11–15)
GLIADIN IGA SER-ACNC: 0.8 U/ML (ref ?–7)
HCT VFR BLD AUTO: 22.1 %
HGB BLD-MCNC: 6 G/DL
IMM GRANULOCYTES # BLD AUTO: 0.11 X10(3) UL (ref 0–1)
IMM GRANULOCYTES NFR BLD: 1 %
LYMPHOCYTES # BLD AUTO: 2.39 X10(3) UL (ref 1–4)
LYMPHOCYTES NFR BLD AUTO: 22.1 %
MCH RBC QN AUTO: 22.6 PG (ref 26–34)
MCHC RBC AUTO-ENTMCNC: 27.1 G/DL (ref 31–37)
MCV RBC AUTO: 83.4 FL
MONOCYTES # BLD AUTO: 0.86 X10(3) UL (ref 0.1–1)
MONOCYTES NFR BLD AUTO: 8 %
NEUTROPHILS # BLD AUTO: 7.3 X10 (3) UL (ref 1.5–7.7)
NEUTROPHILS # BLD AUTO: 7.3 X10(3) UL (ref 1.5–7.7)
NEUTROPHILS NFR BLD AUTO: 67.6 %
PLATELET # BLD AUTO: 422 10(3)UL (ref 150–450)
RBC # BLD AUTO: 2.65 X10(6)UL
RH BLOOD TYPE: POSITIVE
TTG IGA SER-ACNC: 0.6 U/ML (ref ?–7)
WBC # BLD AUTO: 10.8 X10(3) UL (ref 4–11)

## 2022-05-03 PROCEDURE — 87207 SMEAR SPECIAL STAIN: CPT

## 2022-05-03 PROCEDURE — 87015 SPECIMEN INFECT AGNT CONCNTJ: CPT

## 2022-05-03 PROCEDURE — 87272 CRYPTOSPORIDIUM AG IF: CPT

## 2022-05-03 PROCEDURE — 87329 GIARDIA AG IA: CPT

## 2022-05-03 PROCEDURE — 89055 LEUKOCYTE ASSESSMENT FECAL: CPT | Performed by: INTERNAL MEDICINE

## 2022-05-03 NOTE — PROGRESS NOTES
Lipid (choilesterol) shows LDL is better. Goal < 100. But triglycerides should be < 150. Lower carbs. which helps. Recheck in 3 months. Orders written for future labs in 3 months. Hepatitis A and C are negative. Hepatitis B shows shows infection but not active. Hemoglobin A1C is slightly better. Goal is < 6.5. Would recommend increasing glipizide to half a tablet predinner and see how if that helps sugars in the morning which should be 90-1 10. Call with sugars in 1 week. with dilated eye exam.  Check sugars. 2-hour postmeal should be less than 140s. Pre-meal should be 's. Both equally affected A1c.       [address]

## 2022-05-03 NOTE — TELEPHONE ENCOUNTER
Not sure why the sudden decrease from 11-6.0 in a space of 3 months. If she is losing blood from somewhere? She will need a blood transfusion. I will put in orders with infusion center she can do it as an outpatient. But also she will need to see GI ASAP she will need an upper and lower scope to see where she is losing blood from. Orders placed for Dr. Cherise Nieves or partners. Would first check a stat lab at the laboratory. Make sure it is a true reading. If she remains below 70 would have her go to the ER.

## 2022-05-03 NOTE — TELEPHONE ENCOUNTER
Patient's daughter calling back. Advised patient's daughter of Dr He Tran note. Patient's daughter verbalized understanding. She has an appointment scheduled with Dr. Sylvia Betancur.      Future Appointments   Date Time Provider Zehra Mendosai   5/4/2022 12:00 PM Key Martin MD [facility]   5/6/2022  1:30 PM Tiffanie Narvaez MD [facility]   5/11/2022  1:45 PM BILL Choi [facility]   5/17/2022  1:30 PM Johnson Gonsalez MD [address]   6/10/2022  1:00 PM Tiffanie Narvaez MD [facility]   6/27/2022  1:00 PM Rene George MD [facility]   7/27/2022  2:00 PM Antony Uriostegui MD [address] [place] [address] [facility]

## 2022-05-04 ENCOUNTER — OFFICE VISIT (OUTPATIENT)
Dept: RHEUMATOLOGY | Facility: CLINIC | Age: 82
End: 2022-05-04
Payer: MEDICARE

## 2022-05-04 ENCOUNTER — LAB ENCOUNTER (OUTPATIENT)
Dept: LAB | Facility: HOSPITAL | Age: 82
End: 2022-05-04
Attending: INTERNAL MEDICINE
Payer: MEDICARE

## 2022-05-04 VITALS
HEIGHT: 61 IN | SYSTOLIC BLOOD PRESSURE: 142 MMHG | BODY MASS INDEX: 26.43 KG/M2 | WEIGHT: 140 LBS | DIASTOLIC BLOOD PRESSURE: 70 MMHG

## 2022-05-04 DIAGNOSIS — Z51.81 MEDICATION MONITORING ENCOUNTER: ICD-10-CM

## 2022-05-04 DIAGNOSIS — M05.79 RHEUMATOID ARTHRITIS INVOLVING MULTIPLE SITES WITH POSITIVE RHEUMATOID FACTOR (HCC): ICD-10-CM

## 2022-05-04 DIAGNOSIS — R19.7 DIARRHEA, UNSPECIFIED TYPE: ICD-10-CM

## 2022-05-04 LAB
BLOOD TYPE BARCODE: 7300
CRYPTOSP AG STL QL IA: NEGATIVE
G LAMBLIA AG STL QL IA: NEGATIVE

## 2022-05-04 PROCEDURE — 99214 OFFICE O/P EST MOD 30 MIN: CPT | Performed by: INTERNAL MEDICINE

## 2022-05-04 PROCEDURE — 3078F DIAST BP <80 MM HG: CPT | Performed by: INTERNAL MEDICINE

## 2022-05-04 PROCEDURE — 3008F BODY MASS INDEX DOCD: CPT | Performed by: INTERNAL MEDICINE

## 2022-05-04 PROCEDURE — 3077F SYST BP >= 140 MM HG: CPT | Performed by: INTERNAL MEDICINE

## 2022-05-04 RX ORDER — LEFLUNOMIDE 20 MG/1
20 TABLET ORAL DAILY
Qty: 90 TABLET | Refills: 0 | Status: SHIPPED | OUTPATIENT
Start: 2022-05-04

## 2022-05-04 NOTE — PATIENT INSTRUCTIONS
You were seen today for RA  Continue leflunomide 20 mg daily and prednisone 5 mg daily  See gi for the iron def anemia  Blood work in Aug  Follow up in Aug

## 2022-05-05 LAB
HBV QNT BY NAAT (LOG IU/ML): <1 LOG IU/ML
HBV QNT BY NAAT INTERP: DETECTED

## 2022-05-06 ENCOUNTER — TELEPHONE (OUTPATIENT)
Dept: INTERNAL MEDICINE CLINIC | Facility: CLINIC | Age: 82
End: 2022-05-06

## 2022-05-06 ENCOUNTER — OFFICE VISIT (OUTPATIENT)
Dept: INTERNAL MEDICINE CLINIC | Facility: CLINIC | Age: 82
End: 2022-05-06
Payer: MEDICARE

## 2022-05-06 VITALS
TEMPERATURE: 97 F | DIASTOLIC BLOOD PRESSURE: 67 MMHG | WEIGHT: 140.81 LBS | BODY MASS INDEX: 26.58 KG/M2 | RESPIRATION RATE: 17 BRPM | HEART RATE: 79 BPM | OXYGEN SATURATION: 99 % | SYSTOLIC BLOOD PRESSURE: 147 MMHG | HEIGHT: 61 IN

## 2022-05-06 DIAGNOSIS — Z71.85 VACCINE COUNSELING: ICD-10-CM

## 2022-05-06 DIAGNOSIS — C50.911 INFILTRATING DUCTAL CARCINOMA OF RIGHT BREAST, STAGE 1 (HCC): ICD-10-CM

## 2022-05-06 DIAGNOSIS — I10 PRIMARY HYPERTENSION: ICD-10-CM

## 2022-05-06 DIAGNOSIS — E78.5 DYSLIPIDEMIA: ICD-10-CM

## 2022-05-06 DIAGNOSIS — D50.0 IRON DEFICIENCY ANEMIA DUE TO CHRONIC BLOOD LOSS: Primary | ICD-10-CM

## 2022-05-06 DIAGNOSIS — E11.9 TYPE 2 DIABETES MELLITUS WITHOUT COMPLICATION, WITHOUT LONG-TERM CURRENT USE OF INSULIN (HCC): ICD-10-CM

## 2022-05-06 DIAGNOSIS — E83.52 HYPERCALCEMIA: ICD-10-CM

## 2022-05-06 PROCEDURE — 3008F BODY MASS INDEX DOCD: CPT | Performed by: INTERNAL MEDICINE

## 2022-05-06 PROCEDURE — 3078F DIAST BP <80 MM HG: CPT | Performed by: INTERNAL MEDICINE

## 2022-05-06 PROCEDURE — 99215 OFFICE O/P EST HI 40 MIN: CPT | Performed by: INTERNAL MEDICINE

## 2022-05-06 PROCEDURE — 3077F SYST BP >= 140 MM HG: CPT | Performed by: INTERNAL MEDICINE

## 2022-05-06 RX ORDER — FERROUS SULFATE 325(65) MG
325 TABLET ORAL
Qty: 90 TABLET | Refills: 1 | Status: SHIPPED | OUTPATIENT
Start: 2022-05-06

## 2022-05-06 RX ORDER — PREGABALIN 75 MG/1
75 CAPSULE ORAL EVERY EVENING
Qty: 30 CAPSULE | Refills: 1 | Status: SHIPPED | OUTPATIENT
Start: 2022-05-06

## 2022-05-07 ENCOUNTER — LAB ENCOUNTER (OUTPATIENT)
Dept: LAB | Facility: HOSPITAL | Age: 82
End: 2022-05-07
Attending: INTERNAL MEDICINE
Payer: MEDICARE

## 2022-05-07 LAB
BASOPHILS # BLD AUTO: 0.03 X10(3) UL (ref 0–0.2)
BASOPHILS NFR BLD AUTO: 0.3 %
DEPRECATED RDW RBC AUTO: 58.8 FL (ref 35.1–46.3)
EOSINOPHIL # BLD AUTO: 0.15 X10(3) UL (ref 0–0.7)
EOSINOPHIL NFR BLD AUTO: 1.5 %
ERYTHROCYTE [DISTWIDTH] IN BLOOD BY AUTOMATED COUNT: 19.2 % (ref 11–15)
HCT VFR BLD AUTO: 32.9 %
HGB BLD-MCNC: 9.3 G/DL
IMM GRANULOCYTES # BLD AUTO: 0.07 X10(3) UL (ref 0–1)
IMM GRANULOCYTES NFR BLD: 0.7 %
LYMPHOCYTES # BLD AUTO: 2.22 X10(3) UL (ref 1–4)
LYMPHOCYTES NFR BLD AUTO: 22.4 %
MCH RBC QN AUTO: 24.1 PG (ref 26–34)
MCHC RBC AUTO-ENTMCNC: 28.3 G/DL (ref 31–37)
MCV RBC AUTO: 85.2 FL
MONOCYTES # BLD AUTO: 0.95 X10(3) UL (ref 0.1–1)
MONOCYTES NFR BLD AUTO: 9.6 %
NEUTROPHILS # BLD AUTO: 6.47 X10 (3) UL (ref 1.5–7.7)
NEUTROPHILS # BLD AUTO: 6.47 X10(3) UL (ref 1.5–7.7)
NEUTROPHILS NFR BLD AUTO: 65.5 %
PLATELET # BLD AUTO: 298 10(3)UL (ref 150–450)
RBC # BLD AUTO: 3.86 X10(6)UL
WBC # BLD AUTO: 9.9 X10(3) UL (ref 4–11)

## 2022-05-07 PROCEDURE — 36415 COLL VENOUS BLD VENIPUNCTURE: CPT | Performed by: INTERNAL MEDICINE

## 2022-05-07 PROCEDURE — 85025 COMPLETE CBC W/AUTO DIFF WBC: CPT | Performed by: INTERNAL MEDICINE

## 2022-05-07 NOTE — TELEPHONE ENCOUNTER
Patient says she has seen vascular surgery for abnormal ultrasound VANIA results. I am not finding any records in the system. She says she saw Dr. Susan Rawls.

## 2022-05-09 NOTE — PROGRESS NOTES
CBC Normal (white blood cells and platelets), hemoglobin is much better at 9.3. Normal is 12. We will continue iron 65 twice a day with vitamin C 500 IUs twice a day over-the-counter to help absorb the iron and folic acid 1 mg a day. Recheck iron levels in 3 months.

## 2022-05-10 LAB — GLIADIN IGG SER-ACNC: <0.4 U/ML (ref ?–7)

## 2022-05-11 ENCOUNTER — OFFICE VISIT (OUTPATIENT)
Dept: ENDOCRINOLOGY CLINIC | Facility: CLINIC | Age: 82
End: 2022-05-11
Payer: MEDICARE

## 2022-05-11 VITALS
DIASTOLIC BLOOD PRESSURE: 74 MMHG | SYSTOLIC BLOOD PRESSURE: 142 MMHG | BODY MASS INDEX: 26 KG/M2 | HEART RATE: 85 BPM | WEIGHT: 140.19 LBS

## 2022-05-11 DIAGNOSIS — I10 PRIMARY HYPERTENSION: ICD-10-CM

## 2022-05-11 DIAGNOSIS — E11.42 TYPE 2 DIABETES MELLITUS WITH DIABETIC POLYNEUROPATHY, WITHOUT LONG-TERM CURRENT USE OF INSULIN (HCC): Primary | ICD-10-CM

## 2022-05-11 LAB
GLUCOSE BLOOD: 256
TEST STRIP LOT #: NORMAL NUMERIC

## 2022-05-11 PROCEDURE — 3078F DIAST BP <80 MM HG: CPT | Performed by: NURSE PRACTITIONER

## 2022-05-11 PROCEDURE — 3077F SYST BP >= 140 MM HG: CPT | Performed by: NURSE PRACTITIONER

## 2022-05-11 PROCEDURE — 82947 ASSAY GLUCOSE BLOOD QUANT: CPT | Performed by: NURSE PRACTITIONER

## 2022-05-11 PROCEDURE — 99214 OFFICE O/P EST MOD 30 MIN: CPT | Performed by: NURSE PRACTITIONER

## 2022-05-11 RX ORDER — DULOXETIN HYDROCHLORIDE 20 MG/1
20 CAPSULE, DELAYED RELEASE ORAL DAILY
Qty: 90 CAPSULE | Refills: 0 | Status: SHIPPED | OUTPATIENT
Start: 2022-05-11 | End: 2022-08-09

## 2022-05-11 NOTE — PATIENT INSTRUCTIONS
A1C: 7.3% on 5/2/2022 --> decreased from 7.7% on 2/26/2022  Blood glucose: 256 in clinic today    Medications:   -continue with Glipizide 5mg with breakfast     -stop lyrica   -start Duloxetine 20mg once daily in AM     Weight:  Wt Readings from Last 6 Encounters:  05/06/22 : 140 lb 12.8 oz (63.9 kg)  05/04/22 : 140 lb (63.5 kg)  05/02/22 : 132 lb (59.9 kg)  03/11/22 : 148 lb (67.1 kg)  02/28/22 : 147 lb (66.7 kg)  01/28/22 : 154 lb 3.2 oz (69.9 kg)    A1C goal:  <7.5%    Blood sugar testing:  Test your blood sugar 1 time daily   Recommended times to test: Before breakfast (fasting) or 2hrs after meals     Blood sugar targets:  Before breakfast:   (preferably < 110)  Before meals OR 2 hours after meals: <180 (preferably <150)     Call for persistent blood sugars < 75 or > 200.

## 2022-05-17 ENCOUNTER — TELEPHONE (OUTPATIENT)
Dept: GASTROENTEROLOGY | Facility: CLINIC | Age: 82
End: 2022-05-17

## 2022-05-17 ENCOUNTER — MED REC SCAN ONLY (OUTPATIENT)
Dept: INTERNAL MEDICINE CLINIC | Facility: CLINIC | Age: 82
End: 2022-05-17

## 2022-05-17 ENCOUNTER — TELEMEDICINE (OUTPATIENT)
Dept: GASTROENTEROLOGY | Facility: CLINIC | Age: 82
End: 2022-05-17

## 2022-05-17 DIAGNOSIS — D50.9 IRON DEFICIENCY ANEMIA, UNSPECIFIED IRON DEFICIENCY ANEMIA TYPE: Primary | ICD-10-CM

## 2022-05-17 PROCEDURE — 99203 OFFICE O/P NEW LOW 30 MIN: CPT | Performed by: INTERNAL MEDICINE

## 2022-05-17 NOTE — ASSESSMENT & PLAN NOTE
Seen by Dr. Lena Gutierrez vascular surgeon on March 22, 2022 . Evaluated the blood flow study. Not clinically significant per Dr. Lena Gutierrez.

## 2022-05-23 ENCOUNTER — TELEPHONE (OUTPATIENT)
Dept: GASTROENTEROLOGY | Facility: CLINIC | Age: 82
End: 2022-05-23

## 2022-05-23 DIAGNOSIS — D64.9 ANEMIA, UNSPECIFIED TYPE: Primary | ICD-10-CM

## 2022-05-23 PROBLEM — K90.9 MALABSORPTION (HCC): Status: ACTIVE | Noted: 2022-05-23

## 2022-05-23 PROBLEM — K90.9 MALABSORPTION: Status: ACTIVE | Noted: 2022-05-23

## 2022-05-23 NOTE — TELEPHONE ENCOUNTER
Dr. George Sutton    Do you want to 200mg of venofer or 300mg of venofer? Also, Medicare plans typically need 2 diagnosis. Could iron deficiency anemia and malabsorption be used for the order? Thank you    Routed to schedulers:  Please contact patient to schedule colonoscopy and EGD per below orders from Dr. George Sutton.     Thank you

## 2022-05-23 NOTE — TELEPHONE ENCOUNTER
Yes we will need to set up colonosocpy/EGD - anemia dx, sorry about the delay, was on call starting last week and just getting caught up. Golytely bowel prep sent to pharmacy  MAC sedation  Hold oral iron 5 days before  Hold diabetic medications - glipizide day before to resume after the procedure  Other meds as per anesthesia protocol   Will need to set up at the hospital    [id number] [address] to set up iron infusions x 5 through our infusion center, dx iron def anemia.

## 2022-05-23 NOTE — TELEPHONE ENCOUNTER
Managed Care    Please assist with prior authorization for iron infusions as ordered by Dr. Morgan Murphy    Thank you

## 2022-05-23 NOTE — TELEPHONE ENCOUNTER
Dr. Trevor Fernández    I pended iron infusions through the therapy plan section to review and sign if ok. Are the diagnoses selected correct/ok? Thank you    I sent patient a reply to his prior MyChart to update with below plan.

## 2022-05-24 NOTE — TELEPHONE ENCOUNTER
Dani Pastor,    Can you please provide a procedure number for this referral.    Thank you  Tash Tomas

## 2022-05-24 NOTE — TELEPHONE ENCOUNTER
Please review last office visit note it was for [facility] for her injections from Dr. George Sutton.

## 2022-05-25 NOTE — TELEPHONE ENCOUNTER
Dr. Pat Alvarado--    This patients daughter would like to know if you want her to schedule the procedure sooner rather than later.  Please advise, thank you

## 2022-05-25 NOTE — TELEPHONE ENCOUNTER
Ok to set up earlier, we may need to have Dr. Hubert Dobson or Dr. Eulalia Lee set up on their schedule if I can not get her in sooner.

## 2022-05-26 NOTE — TELEPHONE ENCOUNTER
Dr. Maya Ortega--    I didn't see the prep on her medication list. Please send the Golytely to her pharmacy on file.  Thank you    You may close this TE when done :)

## 2022-05-26 NOTE — TELEPHONE ENCOUNTER
Scheduled for:  Colonoscopy [address] and EGD [address]  Provider Name:  Dr. Brenda Chapin  Date:  6/9/22  Location:    [facility]  Sedation:  MAC  Time:  9986 (pt is aware to arrive at 0915)   Prep:  Golytely, sent via [facility] [address] on 5/26/22  Meds/Allergies Reconciled?:  Physician reviewed   Diagnosis with codes:  Anemia D64.9  Was patient informed to call insurance with codes (Y/N):  Yes, I confirmed Medicare/[employer] HMO insurance with the patient. Referral sent?:  Referral was sent at the time of electronic surgical scheduling. [facility] or [address] notified?:  I sent an electronic request to Endo Scheduling and received a confirmation today. Medication Orders: This patient verbally confirmed that she is not taking:   Blood thinners and IS diabetic   Not latex allergy, IS PCN allergy and does not have a pacemaker   This patient is aware to HOLD her BP meds (Valsartan--AM) the day of the procedure     This patient is aware to HOLD her Glipizide the day before and the day of the procedure    This patient is aware to HOLD all supplements and Iron x 7 days before the procedure. Misc Orders:       Further instructions given by staff:   I discussed the prep instructions with the patient which she verbally understood and is aware that I will send the instructions via [employer].

## 2022-05-26 NOTE — TELEPHONE ENCOUNTER
Managed Care    Please see my message below to assist with prior authorization for iron infusions.     Thank you

## 2022-05-27 NOTE — TELEPHONE ENCOUNTER
Infusion Center:    Patient's venofer infusions have been authorized. Please see active therapy plan and call patient to schedule.     Thank you

## 2022-05-27 NOTE — TELEPHONE ENCOUNTER
ÓSCAR Means,    This referral has been approved by [facility] for 12 visits. If you need more visits let me know.     Thank you  Yg Valdez

## 2022-06-02 ENCOUNTER — TELEPHONE (OUTPATIENT)
Dept: ENDOCRINOLOGY CLINIC | Facility: CLINIC | Age: 82
End: 2022-06-02

## 2022-06-02 NOTE — TELEPHONE ENCOUNTER
DULoxetine HCl 40 MG Oral Cap DR Particles, Take 40 mg by mouth daily. , Disp: 90 capsule, Rfl: 0    REASON FOR REQUEST:   ALTERNATIVE REQUESTED     PHARMACY COMMENTS:   ALTERNATIVE REQUESTED: MED NOT COVERED, PRIOR AUTHORIZATION NEEDED Faxed medical record release request to Medical Records ext [phone], for discharge summary to be sent to the patient's cardiologist in [place] per Dr Ayers's request.

## 2022-06-06 ENCOUNTER — LAB ENCOUNTER (OUTPATIENT)
Dept: LAB | Facility: HOSPITAL | Age: 82
End: 2022-06-06
Attending: INTERNAL MEDICINE
Payer: MEDICARE

## 2022-06-06 DIAGNOSIS — Z01.818 PRE-OP TESTING: ICD-10-CM

## 2022-06-06 NOTE — TELEPHONE ENCOUNTER
Please clarify why this is not covered. This is a dose change prescription and initial rx was covered. Ok to started PA if needed. Thank you!

## 2022-06-07 ENCOUNTER — TELEPHONE (OUTPATIENT)
Dept: GASTROENTEROLOGY | Facility: CLINIC | Age: 82
End: 2022-06-07

## 2022-06-07 ENCOUNTER — TELEPHONE (OUTPATIENT)
Dept: INTERNAL MEDICINE CLINIC | Facility: CLINIC | Age: 82
End: 2022-06-07

## 2022-06-07 LAB — SARS-COV-2 RNA RESP QL NAA+PROBE: DETECTED

## 2022-06-07 RX ORDER — MONTELUKAST SODIUM 10 MG/1
10 TABLET ORAL NIGHTLY
Qty: 10 TABLET | Refills: 0 | Status: SHIPPED | OUTPATIENT
Start: 2022-06-07

## 2022-06-07 NOTE — TELEPHONE ENCOUNTER
The daughter who is on  [address] was made aware of recommendations below. She will take her to a urgent care to obtain he Monoclonal Antibodies. Please send the singuilar to the pharmacy per Carola Gee. Medication pended. We discussed isolation for 5 days, reduced symptoms and no fever reducing medication for 48 hours with understanding. The patient does not have a fever. Also stated if condition worsens, develops shortness of breath, chest pains to proceed to the ED with understanding. Per the daughter the patient tested covid positive prior to colonoscopy procedure.

## 2022-06-07 NOTE — TELEPHONE ENCOUNTER
Can try Mucinex or Robitussin if very thick secretions, if watery, can try dayquil or nyquil which will help the drainage. Steam also helps and plenty of rest. Can try singuilar 10 mg at night Rx. If she wants prescription? Can send to the pharmacy. ?  Did she get the monoclonal antibody

## 2022-06-07 NOTE — TELEPHONE ENCOUNTER
Ehsan Sneed MD  [facility] Clinical [facility]; Armin Garza MD  RN to let the pt know she is COVID positive.    Standard recommendations   Reschedule procedure as per anesthesia guidelines     Follow up with Dr. Alpesh Li for further input

## 2022-06-07 NOTE — TELEPHONE ENCOUNTER
Giselle Navarro, (FYI)  Spoke to pharmacist and he re-ran RX without PA request and it went through with $0 co-pay  Thanks

## 2022-06-07 NOTE — TELEPHONE ENCOUNTER
Patient contacted, verified and message from Dr. J Carlos Lafleur given. Patient states she has been dealing with a sinus infection for 3 weeks. No fever. Patient advised to call her PCP and Covid recommendations given. Procedure cancelled in Epic. Nichole Menjivar from [facility] notified. Patient voiced understanding.

## 2022-06-08 NOTE — TELEPHONE ENCOUNTER
Patient positive for COVID 6/6/2022. Schedulers:  Please assist with rescheduling colonoscopy/EGD per anesthesia protocol since positive for COVID on 6/6/2022. Thank you      Will follow up with infusion center to make sure patient scheduled after 10 day quarantine period.

## 2022-06-10 RX ORDER — VALSARTAN 80 MG/1
80 TABLET ORAL DAILY
Qty: 90 TABLET | Refills: 0 | Status: SHIPPED | OUTPATIENT
Start: 2022-06-10 | End: 2022-07-05

## 2022-06-10 NOTE — TELEPHONE ENCOUNTER
Refill passed per [address] protocol. Routing to [employer] for review as Dr. Jackelyn Sierra is out of office.    Requested Prescriptions   Pending Prescriptions Disp Refills    VALSARTAN 80 MG Oral Tab [Pharmacy Med Name: VALSARTAN 80 MG TABLET] 90 tablet 1     Sig: TAKE 1 TABLET BY MOUTH EVERY DAY        Hypertensive Medications Protocol Passed - 6/10/2022 12:06 AM        Passed - CMP or BMP in past 12 months        Passed - Appointment in past 6 or next 3 months        Passed - GFR  > 50     Lab Results   Component Value Date    GFRAA 54 (L) 05/02/2022                      Recent Outpatient Visits              3 weeks ago Iron deficiency anemia, unspecified iron deficiency anemia type    [address], [address], Marcia Graf MD    Telemedicine    1 month ago Type 2 diabetes mellitus with diabetic polyneuropathy, without long-term current use of insulin [facility])    [address], [address]    Office Visit    1 month ago Iron deficiency anemia due to chronic blood loss    [address] Gabe Garrison., MD    Office Visit    1 month ago Rheumatoid arthritis involving multiple sites with positive rheumatoid factor [facility])    [facility], [address], Felipe Cheney MD    Office Visit    1 month ago Neuropathy    [facility], [address], [address] [place] [address]    Office Visit           Future Appointments         Provider Department Appt Notes    In 2 weeks Rangel Norwood MD [facility]    In 1 month Radha Thompson MD [facility] Ophthalmology np dm ee *policy informed to daughter    In 2 month Mariela Carter, [facility], [address] 3  mos f/u    In 2 months PER [facility] 3 months fu

## 2022-06-20 NOTE — TELEPHONE ENCOUNTER
Schedulers:  Please assist with rescheduling colonoscopy/EGD with Dr. Sylvia Betancur. Orders from Dr. Sylvia Betancur further down in this thread. Thank you    Per [facility] patient is scheduled for venofer infusions.   Your Appointments    Wednesday June 22, 2022  3:00 PM  Infusion Visit with EM [facility]) [facility]  [phone]             Wednesday June 29, 2022  3:00 PM  Infusion Visit with EM CC INFR[facility] [address] [place] [address]) [facility]  [phone]      Wednesday July 06, 2022  3:00 PM  Infusion Visit with EM CC [facility] [address] [place] [address]) [facility]  [phone]      Wednesday July 13, 2022  2:00 PM  Infusion Visit with EM CC [facility] [address] [place] [address]) [facility]  [phone]      Wednesday July 20, 2022  2:30 PM  Infusion Visit with EM CC [facility] [address] [place] [address]) [facility]  [phone]

## 2022-06-22 ENCOUNTER — TELEPHONE (OUTPATIENT)
Dept: GASTROENTEROLOGY | Facility: CLINIC | Age: 82
End: 2022-06-22

## 2022-06-22 ENCOUNTER — OFFICE VISIT (OUTPATIENT)
Dept: HEMATOLOGY/ONCOLOGY | Facility: HOSPITAL | Age: 82
End: 2022-06-22
Attending: INTERNAL MEDICINE
Payer: MEDICARE

## 2022-06-22 VITALS
SYSTOLIC BLOOD PRESSURE: 129 MMHG | OXYGEN SATURATION: 97 % | DIASTOLIC BLOOD PRESSURE: 57 MMHG | TEMPERATURE: 98 F | RESPIRATION RATE: 16 BRPM | HEART RATE: 83 BPM

## 2022-06-22 DIAGNOSIS — D50.9 IRON DEFICIENCY ANEMIA, UNSPECIFIED IRON DEFICIENCY ANEMIA TYPE: Primary | ICD-10-CM

## 2022-06-22 DIAGNOSIS — K90.9 INTESTINAL MALABSORPTION, UNSPECIFIED TYPE: Primary | ICD-10-CM

## 2022-06-22 DIAGNOSIS — D50.9 IRON DEFICIENCY ANEMIA, UNSPECIFIED IRON DEFICIENCY ANEMIA TYPE: ICD-10-CM

## 2022-06-22 PROCEDURE — 96374 THER/PROPH/DIAG INJ IV PUSH: CPT

## 2022-06-22 NOTE — PROGRESS NOTES
Patient arrives for venofer 1 of 5 ordered by  for BUZZ. Patient reports fatigue. PIV started in right wrist with  positive blood return noted. Venofer given slowly over 10 minutes with free flowing ns, positive blood return noted throughout. Patient tolerated well, no s/s of adverse reaction noted. Post vs after 30 minute observation wnl. PIV removed, site covered with 2x2 and coban. Patient discharged stable.

## 2022-06-27 ENCOUNTER — PROCEDURE VISIT (OUTPATIENT)
Dept: NEUROLOGY | Facility: CLINIC | Age: 82
End: 2022-06-27
Payer: MEDICARE

## 2022-06-27 DIAGNOSIS — G62.9 NEUROPATHY: Primary | ICD-10-CM

## 2022-06-27 NOTE — PROCEDURES
Ms. Quynh Morales, relocated to the [facility] after her  passed away by her daughter. She relates 6-month history of sensory neuropathy symptoms in feet (right. No sensory symptoms in the hands. She relates that she was just recently diagnosed with diabetes. However reviewing epic records, her glucose, hemoglobin A1c been elevated for several years. Due to IT problems, nerve conduction studies, EMG report cannot be at the present time transfer to the chart. However, bilateral sural and medial plantar sensory nerve action potentials are absent. Bilateral motor studies reviewed slowing of conduction velocity. Needle examination was abnormal in both EDB muscle. 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 Would be reasonable to complete metabolic work-up which should include vitamin S63, folic acid, CLEMENT, serum electrophoresis. Once IT problem has been resolved I will send you a copy of the EMG nerve conduction study report.

## 2022-06-28 ENCOUNTER — TELEPHONE (OUTPATIENT)
Dept: INTERNAL MEDICINE CLINIC | Facility: CLINIC | Age: 82
End: 2022-06-28

## 2022-06-28 ENCOUNTER — TELEPHONE (OUTPATIENT)
Dept: PODIATRY CLINIC | Facility: CLINIC | Age: 82
End: 2022-06-28

## 2022-06-28 DIAGNOSIS — G62.9 NEUROPATHY: Primary | ICD-10-CM

## 2022-06-28 NOTE — TELEPHONE ENCOUNTER
EDT and RN at bedside, patient has blood in diaper. Diaper and pad changed, patient positioned for comfort.   Noted.

## 2022-06-28 NOTE — TELEPHONE ENCOUNTER
Called pt, also spoke with daughter to make an appt to review EMG with Dr Jory Lee on 7/12/22 @ 11:00.

## 2022-06-28 NOTE — TELEPHONE ENCOUNTER
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 Needs to get sugars under well control. Otherwise worsening of neuropathy. Remaining happy to reverse what is done but we can prevent worsening. Check vitamin D23, folic acid, CLEMENT, serum electrophoresis.

## 2022-06-28 NOTE — TELEPHONE ENCOUNTER
Advised patient's daughter of the note below and tests to complete. Daughter asked me to place the message from Dr. Sarina Cr on her mom's my chart.  done

## 2022-06-29 ENCOUNTER — OFFICE VISIT (OUTPATIENT)
Dept: HEMATOLOGY/ONCOLOGY | Facility: HOSPITAL | Age: 82
End: 2022-06-29
Attending: INTERNAL MEDICINE
Payer: MEDICARE

## 2022-06-29 VITALS
SYSTOLIC BLOOD PRESSURE: 138 MMHG | OXYGEN SATURATION: 99 % | HEART RATE: 85 BPM | RESPIRATION RATE: 16 BRPM | TEMPERATURE: 98 F | DIASTOLIC BLOOD PRESSURE: 70 MMHG

## 2022-06-29 DIAGNOSIS — K90.9 INTESTINAL MALABSORPTION, UNSPECIFIED TYPE: Primary | ICD-10-CM

## 2022-06-29 DIAGNOSIS — D50.9 IRON DEFICIENCY ANEMIA, UNSPECIFIED IRON DEFICIENCY ANEMIA TYPE: ICD-10-CM

## 2022-06-29 PROCEDURE — 96374 THER/PROPH/DIAG INJ IV PUSH: CPT

## 2022-06-29 NOTE — PROGRESS NOTES
Patient arrives for venofer 2 of 5 ordered by  for BUZZ. Patient reports fatigue. PIV started in right forearm with  positive blood return noted, x4 iv sticks. Venofer given slowly over 10 minutes with free flowing ns, positive blood return noted throughout. Patient tolerated well, no s/s of adverse reaction noted. Post vs after 30 minute observation wnl. PIV removed, site covered with 2x2 and coban. Patient discharged stable.

## 2022-07-05 ENCOUNTER — TELEPHONE (OUTPATIENT)
Dept: ENDOCRINOLOGY CLINIC | Facility: CLINIC | Age: 82
End: 2022-07-05

## 2022-07-05 DIAGNOSIS — Z51.81 MEDICATION MONITORING ENCOUNTER: ICD-10-CM

## 2022-07-05 DIAGNOSIS — M05.79 RHEUMATOID ARTHRITIS INVOLVING MULTIPLE SITES WITH POSITIVE RHEUMATOID FACTOR (HCC): ICD-10-CM

## 2022-07-05 RX ORDER — CARVEDILOL 25 MG/1
25 TABLET ORAL 2 TIMES DAILY WITH MEALS
Qty: 180 TABLET | Refills: 1 | Status: SHIPPED | OUTPATIENT
Start: 2022-07-05 | End: 2023-01-04

## 2022-07-05 RX ORDER — PREDNISONE 1 MG/1
5 TABLET ORAL DAILY
Qty: 90 TABLET | Refills: 0 | Status: SHIPPED | OUTPATIENT
Start: 2022-07-05

## 2022-07-05 RX ORDER — VALSARTAN 80 MG/1
80 TABLET ORAL DAILY
Qty: 90 TABLET | Refills: 0 | Status: SHIPPED | OUTPATIENT
Start: 2022-07-05 | End: 2022-11-27

## 2022-07-06 ENCOUNTER — OFFICE VISIT (OUTPATIENT)
Dept: HEMATOLOGY/ONCOLOGY | Facility: HOSPITAL | Age: 82
End: 2022-07-06
Attending: INTERNAL MEDICINE
Payer: MEDICARE

## 2022-07-06 VITALS
TEMPERATURE: 98 F | DIASTOLIC BLOOD PRESSURE: 77 MMHG | OXYGEN SATURATION: 99 % | HEART RATE: 82 BPM | SYSTOLIC BLOOD PRESSURE: 140 MMHG | RESPIRATION RATE: 16 BRPM

## 2022-07-06 DIAGNOSIS — D50.9 IRON DEFICIENCY ANEMIA, UNSPECIFIED IRON DEFICIENCY ANEMIA TYPE: ICD-10-CM

## 2022-07-06 DIAGNOSIS — K90.9 INTESTINAL MALABSORPTION, UNSPECIFIED TYPE: Primary | ICD-10-CM

## 2022-07-06 PROCEDURE — 96365 THER/PROPH/DIAG IV INF INIT: CPT

## 2022-07-06 RX ORDER — DULOXETINE 40 MG/1
40 CAPSULE, DELAYED RELEASE ORAL DAILY
Qty: 90 CAPSULE | Refills: 0 | Status: SHIPPED | OUTPATIENT
Start: 2022-07-06

## 2022-07-06 NOTE — PROGRESS NOTES
Patient arrives for venofer 3 of 5 ordered by  for BUZZ. Patient reports fatigue. PIV started in right forearm with  positive blood return noted, x1 IV stick done by [employer]. Venofer given slowly over 25 minutes with free flowing ns, positive blood return noted throughout. Patient tolerated well, no s/s of adverse reaction noted. Post vs after 30 minute observation wnl. PIV removed, site covered with 2x2 and coban. Patient discharged stable.

## 2022-07-07 NOTE — TELEPHONE ENCOUNTER
Reason for request:   ALTERNATIVE REQUESTED     Pharmacy Comments:   ALTERNATIVE REQUESTED: NON FORMULARY .

## 2022-07-12 ENCOUNTER — OFFICE VISIT (OUTPATIENT)
Dept: PODIATRY CLINIC | Facility: CLINIC | Age: 82
End: 2022-07-12
Payer: MEDICARE

## 2022-07-12 DIAGNOSIS — G62.9 NEUROPATHY: Primary | ICD-10-CM

## 2022-07-12 PROCEDURE — 99213 OFFICE O/P EST LOW 20 MIN: CPT | Performed by: PODIATRIST

## 2022-07-12 PROCEDURE — 1126F AMNT PAIN NOTED NONE PRSNT: CPT | Performed by: PODIATRIST

## 2022-07-13 ENCOUNTER — OFFICE VISIT (OUTPATIENT)
Dept: HEMATOLOGY/ONCOLOGY | Facility: HOSPITAL | Age: 82
End: 2022-07-13
Attending: INTERNAL MEDICINE
Payer: MEDICARE

## 2022-07-13 VITALS
DIASTOLIC BLOOD PRESSURE: 60 MMHG | HEART RATE: 80 BPM | SYSTOLIC BLOOD PRESSURE: 142 MMHG | OXYGEN SATURATION: 99 % | TEMPERATURE: 98 F | RESPIRATION RATE: 16 BRPM

## 2022-07-13 DIAGNOSIS — K90.9 INTESTINAL MALABSORPTION, UNSPECIFIED TYPE: Primary | ICD-10-CM

## 2022-07-13 DIAGNOSIS — D50.9 IRON DEFICIENCY ANEMIA, UNSPECIFIED IRON DEFICIENCY ANEMIA TYPE: ICD-10-CM

## 2022-07-13 PROCEDURE — 96365 THER/PROPH/DIAG IV INF INIT: CPT

## 2022-07-19 ENCOUNTER — TELEPHONE (OUTPATIENT)
Dept: GASTROENTEROLOGY | Facility: CLINIC | Age: 82
End: 2022-07-19

## 2022-07-20 ENCOUNTER — OFFICE VISIT (OUTPATIENT)
Dept: HEMATOLOGY/ONCOLOGY | Facility: HOSPITAL | Age: 82
End: 2022-07-20
Attending: INTERNAL MEDICINE
Payer: MEDICARE

## 2022-07-20 VITALS
HEART RATE: 77 BPM | RESPIRATION RATE: 16 BRPM | TEMPERATURE: 98 F | OXYGEN SATURATION: 100 % | SYSTOLIC BLOOD PRESSURE: 143 MMHG | DIASTOLIC BLOOD PRESSURE: 61 MMHG

## 2022-07-20 DIAGNOSIS — D50.9 IRON DEFICIENCY ANEMIA, UNSPECIFIED IRON DEFICIENCY ANEMIA TYPE: ICD-10-CM

## 2022-07-20 DIAGNOSIS — K90.9 INTESTINAL MALABSORPTION, UNSPECIFIED TYPE: Primary | ICD-10-CM

## 2022-07-20 PROCEDURE — 96374 THER/PROPH/DIAG INJ IV PUSH: CPT

## 2022-07-20 NOTE — PROGRESS NOTES
Patient arrives for venofer # 5 of 5 ordered by  for Iron deficiency anemia. See labs 5/2, 5/7  Reports increased energy, increased appetite too. Colonoscopy tomorrow. PIV started in left forearm with  positive blood return noted, x2 IV stick done by Crystal Larsen RN. Venofer given slowly via side port of free flowing NS flush bag. Denied any burning  Or issues with the saline flowing. Patient tolerated well, no s/s of adverse reaction noted. Post vs after 30 minute observation wnl. PIV removed, site covered with 2x2 and coban. No redness swelling or discoloration at site. Patient discharged stable.

## 2022-07-21 ENCOUNTER — ANESTHESIA EVENT (OUTPATIENT)
Dept: ENDOSCOPY | Facility: HOSPITAL | Age: 82
End: 2022-07-21
Payer: MEDICARE

## 2022-07-21 ENCOUNTER — HOSPITAL ENCOUNTER (OUTPATIENT)
Facility: HOSPITAL | Age: 82
Setting detail: HOSPITAL OUTPATIENT SURGERY
Discharge: HOME | End: 2022-07-21
Attending: INTERNAL MEDICINE | Admitting: INTERNAL MEDICINE
Payer: MEDICARE

## 2022-07-21 ENCOUNTER — ANESTHESIA (OUTPATIENT)
Dept: ENDOSCOPY | Facility: HOSPITAL | Age: 82
End: 2022-07-21
Payer: MEDICARE

## 2022-07-21 VITALS
WEIGHT: 139 LBS | RESPIRATION RATE: 19 BRPM | HEIGHT: 61 IN | SYSTOLIC BLOOD PRESSURE: 156 MMHG | HEART RATE: 99 BPM | DIASTOLIC BLOOD PRESSURE: 78 MMHG | BODY MASS INDEX: 26.24 KG/M2 | TEMPERATURE: 97 F | OXYGEN SATURATION: 99 %

## 2022-07-21 DIAGNOSIS — D50.9 IRON DEFICIENCY ANEMIA, UNSPECIFIED IRON DEFICIENCY ANEMIA TYPE: ICD-10-CM

## 2022-07-21 LAB — GLUCOSE BLDC GLUCOMTR-MCNC: 120 MG/DL (ref 70–99)

## 2022-07-21 PROCEDURE — 0DJ08ZZ INSPECTION OF UPPER INTESTINAL TRACT, VIA NATURAL OR ARTIFICIAL OPENING ENDOSCOPIC: ICD-10-PCS | Performed by: INTERNAL MEDICINE

## 2022-07-21 PROCEDURE — 0DBN8ZX EXCISION OF SIGMOID COLON, VIA NATURAL OR ARTIFICIAL OPENING ENDOSCOPIC, DIAGNOSTIC: ICD-10-PCS | Performed by: INTERNAL MEDICINE

## 2022-07-21 PROCEDURE — 43270 EGD LESION ABLATION: CPT | Performed by: INTERNAL MEDICINE

## 2022-07-21 PROCEDURE — 45388 COLONOSCOPY W/ABLATION: CPT | Performed by: INTERNAL MEDICINE

## 2022-07-21 PROCEDURE — 45385 COLONOSCOPY W/LESION REMOVAL: CPT | Performed by: INTERNAL MEDICINE

## 2022-07-21 RX ORDER — NICOTINE POLACRILEX 4 MG
15 LOZENGE BUCCAL
Status: DISCONTINUED | OUTPATIENT
Start: 2022-07-21 | End: 2022-07-21

## 2022-07-21 RX ORDER — DEXTROSE MONOHYDRATE 25 G/50ML
50 INJECTION, SOLUTION INTRAVENOUS
Status: DISCONTINUED | OUTPATIENT
Start: 2022-07-21 | End: 2022-07-21

## 2022-07-21 RX ORDER — NICOTINE POLACRILEX 4 MG
30 LOZENGE BUCCAL
Status: DISCONTINUED | OUTPATIENT
Start: 2022-07-21 | End: 2022-07-21

## 2022-07-21 RX ORDER — LIDOCAINE HYDROCHLORIDE 10 MG/ML
INJECTION, SOLUTION EPIDURAL; INFILTRATION; INTRACAUDAL; PERINEURAL AS NEEDED
Status: DISCONTINUED | OUTPATIENT
Start: 2022-07-21 | End: 2022-07-21 | Stop reason: SURG

## 2022-07-21 RX ORDER — NALOXONE HYDROCHLORIDE 0.4 MG/ML
80 INJECTION, SOLUTION INTRAMUSCULAR; INTRAVENOUS; SUBCUTANEOUS AS NEEDED
Status: DISCONTINUED | OUTPATIENT
Start: 2022-07-21 | End: 2022-07-21

## 2022-07-21 RX ORDER — SODIUM CHLORIDE, SODIUM LACTATE, POTASSIUM CHLORIDE, CALCIUM CHLORIDE 600; 310; 30; 20 MG/100ML; MG/100ML; MG/100ML; MG/100ML
INJECTION, SOLUTION INTRAVENOUS CONTINUOUS
Status: DISCONTINUED | OUTPATIENT
Start: 2022-07-21 | End: 2022-07-21

## 2022-07-21 RX ADMIN — SODIUM CHLORIDE, SODIUM LACTATE, POTASSIUM CHLORIDE, CALCIUM CHLORIDE: 600; 310; 30; 20 INJECTION, SOLUTION INTRAVENOUS at 09:25:00

## 2022-07-21 RX ADMIN — SODIUM CHLORIDE, SODIUM LACTATE, POTASSIUM CHLORIDE, CALCIUM CHLORIDE: 600; 310; 30; 20 INJECTION, SOLUTION INTRAVENOUS at 10:04:00

## 2022-07-21 RX ADMIN — LIDOCAINE HYDROCHLORIDE 30 MG: 10 INJECTION, SOLUTION EPIDURAL; INFILTRATION; INTRACAUDAL; PERINEURAL at 09:30:00

## 2022-07-21 NOTE — OPERATIVE REPORT
[facility] Endoscopy Report      Preoperative Diagnosis:  - severe anemia ( acute on chronic )       Postoperative Diagnosis:  - cecal AVM s/p treatment  - diverticulosis  - colon polyp x 1  - internal hemorrhoids  - duodenal AVMs x 2 s/p treatment  - hiatal hernia   - Schatzki's ring      Procedure:    Colonoscopy   Esophagogastroduodenoscopy       Surgeon:  Tonio Rizvi M.D. Anesthesia:  MAC sedation    Technique:  After informed consent, the patient was placed in the left lateral recumbent position. Digital rectal examination revealed no palpable intraluminal abnormalities. An Olympus variable stiffness 190 series HD colonoscope was inserted into the rectum and advanced under direct vision by following the lumen to the cecum. The colon was examined upon withdrawal in the left lateral position. Following colonoscopy, an Olympus adult HD gastroscope was inserted into the hypopharynx and advanced under direct vision into the esophagus, stomach and duodenum. The endoscope was withdrawn to the stomach where retroflexion of the annulus, body, cardia and fundus was performed. The instrument was straightened, insufflated air and fluid were suctioned and the endoscope was withdrawn. The procedures were well tolerated without immediate complication. Findings:  The preparation of the colon was good. The terminal ileum was examined for 4 cm and visually normal.  The ileocecal valve was well preserved. The visualized colonic mucosa from the cecum to the anal verge was normal with an intact vascular pattern. Isolated AVM noted in the base of the cecum approximately 3 to 4 mm in size, status post application x3 of APC (right colon settings) and a single clip placed across the treated area. Pandiverticulosis with scattered rare diverticula noted in the descending and transverse colon and widemouth complex diverticula noted in the sigmoid colon. No evidence of diverticulitis.     Colon polyp x1 removed by cold snare technique from the distal sigmoid, this was sessile approximately 4 mm in size and there was no bleeding at the polypectomy site and specimens retrieved and sent for analysis. The esophagus showed a Schatzki's ring at the GE junction which was widely patent. .  The GE junction and diaphragmatic impression were at 34 cm and 37 cm for a 3 cm hiatal hernia. The stomach distended appropriately with insufflated air. The mucosa of the stomach including cardia, fundus, gastric body and antrum were normal.  Located in the duodenum were two small 2 millimeter AVMs, both of these were treated with APC duodenal settings. Estimated blood loss-insignificant  Specimens-colon polyp      Impression:  - cecal AVM s/p treatment  - diverticulosis  - colon polyp x 1  - internal hemorrhoids  - duodenal AVMs x 2 s/p treatment  - hiatal hernia   - Schatzki's ring      Recommendations:  - Post polypectomy instructions given  - Follow blood counts   - High fiber diet for diverticular disease  - Symptomatic treatment of hemorrhoids          Gregoria Quiroz.  Rosi Rivero MD  7/21/2022  10:07 AM

## 2022-07-23 RX ORDER — CYCLOBENZAPRINE HCL 10 MG
10 TABLET ORAL NIGHTLY
Qty: 90 TABLET | Refills: 1 | Status: SHIPPED | OUTPATIENT
Start: 2022-07-23 | End: 2023-05-31

## 2022-07-25 ENCOUNTER — TELEPHONE (OUTPATIENT)
Dept: GASTROENTEROLOGY | Facility: CLINIC | Age: 82
End: 2022-07-25

## 2022-07-25 DIAGNOSIS — D50.9 IRON DEFICIENCY ANEMIA, UNSPECIFIED IRON DEFICIENCY ANEMIA TYPE: Primary | ICD-10-CM

## 2022-07-25 PROBLEM — K31.819 AVM (ARTERIOVENOUS MALFORMATION) OF DUODENUM, ACQUIRED: Status: ACTIVE | Noted: 2022-07-25

## 2022-07-25 NOTE — TELEPHONE ENCOUNTER
----- Message from Keeley Flores MD sent at 7/25/2022  4:24 PM CDT -----  Colonoscopy showed 1 AVM ( treated) and one colon polyp. The upper endoscopy showed 2 AVMs ( both treated)   These are the cause of your anemia ( low blood counts)     There may be more AVMs in the small intestine. Plan to monitor blood counts.

## 2022-07-25 NOTE — TELEPHONE ENCOUNTER
I would advise that they see Dr. Pallavi Saeed in hematology -we had discussed close monitoring of her blood counts, iron infusions and potential subcu octreotide which can sometimes slow down AVM formation and decrease bleeding. Referral placed.

## 2022-07-25 NOTE — TELEPHONE ENCOUNTER
Dr. Brenda Chapin    I reviewed your below message with Serafin Bocanegrafabiano (daughter ok per Natasha Price)    She said to me that you had mentioned seeing a specialist.  Does she need to see someone else for this issue?     Thank you

## 2022-07-26 NOTE — TELEPHONE ENCOUNTER
I spoke to daughter and the patient. Message from Dr. Heladio Fermin given. Phone number for Dr. Marc Brown given. Voiced understanding.

## 2022-07-27 ENCOUNTER — OFFICE VISIT (OUTPATIENT)
Dept: OPHTHALMOLOGY | Facility: CLINIC | Age: 82
End: 2022-07-27
Payer: MEDICARE

## 2022-07-27 DIAGNOSIS — Z96.1 PSEUDOPHAKIA OF BOTH EYES: Primary | ICD-10-CM

## 2022-07-27 DIAGNOSIS — H43.393 VITREOUS FLOATERS OF BOTH EYES: ICD-10-CM

## 2022-07-27 DIAGNOSIS — E11.9 TYPE 2 DIABETES MELLITUS WITHOUT RETINOPATHY (HCC): ICD-10-CM

## 2022-07-27 DIAGNOSIS — H40.003 GLAUCOMA SUSPECT OF BOTH EYES: ICD-10-CM

## 2022-07-27 PROCEDURE — 92004 COMPRE OPH EXAM NEW PT 1/>: CPT | Performed by: OPHTHALMOLOGY

## 2022-07-27 PROCEDURE — 92250 FUNDUS PHOTOGRAPHY W/I&R: CPT | Performed by: OPHTHALMOLOGY

## 2022-07-27 PROCEDURE — 1126F AMNT PAIN NOTED NONE PRSNT: CPT | Performed by: OPHTHALMOLOGY

## 2022-07-27 NOTE — ASSESSMENT & PLAN NOTE
Discussed with patient that she is a glaucoma suspect based on increased cupping of the optic nerves in both eyes. Retinal photos taken today to document optic nerves. Glaucoma diagnostic testing ordered. Will not start medication, but will continue to observe. Patient verbalized understanding.     Will see patient for next available VF, OCT and pachy with no MD, if glaucoma testing is normal will see patient in 1 year for a diabetic exam.

## 2022-07-27 NOTE — PATIENT INSTRUCTIONS
Pseudophakia of both eyes  No treatment  Continue with OTC reading glasses    Vitreous floaters of both eyes   There is no evidence of retinal pathology. All signs and symptoms of retinal detachment/tears explained in detail. Patient instructed to call the office if they experience increase in floaters, increase in flashes of light, loss of vision or curtain or veil effect. Glaucoma suspect of both eyes  Discussed with patient that she is a glaucoma suspect based on increased cupping of the optic nerves in both eyes. Retinal photos taken today to document optic nerves. Glaucoma diagnostic testing ordered. Will not start medication, but will continue to observe. Patient verbalized understanding. Will see patient for next available VF, OCT and pachy with no MD, if glaucoma testing is normal will see patient in 1 year for a diabetic exam.        Type 2 diabetes mellitus without retinopathy ([address]  Diabetes type II: no background of retinopathy, no signs of neovascularization noted. Discussed ocular and systemic benefits of blood sugar control. Diagnosis and treatment discussed in detail with patient.

## 2022-07-30 ENCOUNTER — NURSE ONLY (OUTPATIENT)
Dept: OPHTHALMOLOGY | Facility: CLINIC | Age: 82
End: 2022-07-30
Payer: MEDICARE

## 2022-07-30 DIAGNOSIS — H40.003 GLAUCOMA SUSPECT OF BOTH EYES: ICD-10-CM

## 2022-07-30 PROCEDURE — 92083 EXTENDED VISUAL FIELD XM: CPT | Performed by: OPHTHALMOLOGY

## 2022-07-30 PROCEDURE — 76514 ECHO EXAM OF EYE THICKNESS: CPT | Performed by: OPHTHALMOLOGY

## 2022-07-30 PROCEDURE — 92133 CPTRZD OPH DX IMG PST SGM ON: CPT | Performed by: OPHTHALMOLOGY

## 2022-08-01 ENCOUNTER — TELEPHONE (OUTPATIENT)
Dept: OPHTHALMOLOGY | Facility: CLINIC | Age: 82
End: 2022-08-01

## 2022-08-05 ENCOUNTER — LAB ENCOUNTER (OUTPATIENT)
Dept: LAB | Age: 82
End: 2022-08-05
Attending: NURSE PRACTITIONER
Payer: MEDICARE

## 2022-08-05 DIAGNOSIS — Z51.81 MEDICATION MONITORING ENCOUNTER: ICD-10-CM

## 2022-08-05 DIAGNOSIS — M05.79 RHEUMATOID ARTHRITIS INVOLVING MULTIPLE SITES WITH POSITIVE RHEUMATOID FACTOR (HCC): ICD-10-CM

## 2022-08-05 DIAGNOSIS — G62.9 NEUROPATHY: ICD-10-CM

## 2022-08-05 LAB
BASOPHILS # BLD AUTO: 0.05 X10(3) UL (ref 0–0.2)
BASOPHILS NFR BLD AUTO: 0.6 %
CRP SERPL-MCNC: 1.6 MG/DL (ref ?–0.3)
DEPRECATED RDW RBC AUTO: 72.6 FL (ref 35.1–46.3)
EOSINOPHIL # BLD AUTO: 0.13 X10(3) UL (ref 0–0.7)
EOSINOPHIL NFR BLD AUTO: 1.5 %
ERYTHROCYTE [DISTWIDTH] IN BLOOD BY AUTOMATED COUNT: 21.4 % (ref 11–15)
ERYTHROCYTE [SEDIMENTATION RATE] IN BLOOD: 49 MM/HR
FOLATE SERPL-MCNC: 14.2 NG/ML (ref 8.7–?)
HCT VFR BLD AUTO: 31.7 %
HGB BLD-MCNC: 9.6 G/DL
IMM GRANULOCYTES # BLD AUTO: 0.07 X10(3) UL (ref 0–1)
IMM GRANULOCYTES NFR BLD: 0.8 %
LYMPHOCYTES # BLD AUTO: 1.87 X10(3) UL (ref 1–4)
LYMPHOCYTES NFR BLD AUTO: 21.4 %
MCH RBC QN AUTO: 28.7 PG (ref 26–34)
MCHC RBC AUTO-ENTMCNC: 30.3 G/DL (ref 31–37)
MCV RBC AUTO: 94.6 FL
MONOCYTES # BLD AUTO: 0.68 X10(3) UL (ref 0.1–1)
MONOCYTES NFR BLD AUTO: 7.8 %
NEUTROPHILS # BLD AUTO: 5.95 X10 (3) UL (ref 1.5–7.7)
NEUTROPHILS # BLD AUTO: 5.95 X10(3) UL (ref 1.5–7.7)
NEUTROPHILS NFR BLD AUTO: 67.9 %
PLATELET # BLD AUTO: 281 10(3)UL (ref 150–450)
PLATELET MORPHOLOGY: NORMAL
RBC # BLD AUTO: 3.35 X10(6)UL
TSI SER-ACNC: 0.82 MIU/ML (ref 0.36–3.74)
VIT B12 SERPL-MCNC: 854 PG/ML (ref 193–986)
WBC # BLD AUTO: 8.8 X10(3) UL (ref 4–11)

## 2022-08-05 PROCEDURE — 86140 C-REACTIVE PROTEIN: CPT

## 2022-08-05 PROCEDURE — 85025 COMPLETE CBC W/AUTO DIFF WBC: CPT

## 2022-08-05 PROCEDURE — 86038 ANTINUCLEAR ANTIBODIES: CPT

## 2022-08-05 PROCEDURE — 82746 ASSAY OF FOLIC ACID SERUM: CPT | Performed by: INTERNAL MEDICINE

## 2022-08-05 PROCEDURE — 80061 LIPID PANEL: CPT | Performed by: INTERNAL MEDICINE

## 2022-08-05 PROCEDURE — 80053 COMPREHEN METABOLIC PANEL: CPT | Performed by: INTERNAL MEDICINE

## 2022-08-05 PROCEDURE — 36415 COLL VENOUS BLD VENIPUNCTURE: CPT

## 2022-08-05 PROCEDURE — 82607 VITAMIN B-12: CPT | Performed by: INTERNAL MEDICINE

## 2022-08-05 PROCEDURE — 84165 PROTEIN E-PHORESIS SERUM: CPT

## 2022-08-05 PROCEDURE — 84443 ASSAY THYROID STIM HORMONE: CPT | Performed by: INTERNAL MEDICINE

## 2022-08-05 PROCEDURE — 85652 RBC SED RATE AUTOMATED: CPT

## 2022-08-08 ENCOUNTER — OFFICE VISIT (OUTPATIENT)
Dept: HEMATOLOGY/ONCOLOGY | Facility: HOSPITAL | Age: 82
End: 2022-08-08
Attending: INTERNAL MEDICINE
Payer: MEDICARE

## 2022-08-08 ENCOUNTER — HOSPITAL ENCOUNTER (OUTPATIENT)
Dept: GENERAL RADIOLOGY | Facility: HOSPITAL | Age: 82
Discharge: HOME | End: 2022-08-08
Attending: INTERNAL MEDICINE
Payer: MEDICARE

## 2022-08-08 ENCOUNTER — LAB ENCOUNTER (OUTPATIENT)
Dept: LAB | Facility: HOSPITAL | Age: 82
End: 2022-08-08
Attending: INTERNAL MEDICINE
Payer: MEDICARE

## 2022-08-08 ENCOUNTER — OFFICE VISIT (OUTPATIENT)
Dept: RHEUMATOLOGY | Facility: CLINIC | Age: 82
End: 2022-08-08
Payer: MEDICARE

## 2022-08-08 VITALS
WEIGHT: 139 LBS | BODY MASS INDEX: 26.24 KG/M2 | DIASTOLIC BLOOD PRESSURE: 79 MMHG | HEART RATE: 87 BPM | SYSTOLIC BLOOD PRESSURE: 151 MMHG | HEIGHT: 61 IN

## 2022-08-08 VITALS
TEMPERATURE: 97 F | DIASTOLIC BLOOD PRESSURE: 60 MMHG | WEIGHT: 142 LBS | SYSTOLIC BLOOD PRESSURE: 152 MMHG | RESPIRATION RATE: 16 BRPM | HEIGHT: 61 IN | OXYGEN SATURATION: 93 % | BODY MASS INDEX: 26.81 KG/M2 | HEART RATE: 90 BPM

## 2022-08-08 DIAGNOSIS — K31.819 AVM (ARTERIOVENOUS MALFORMATION) OF DUODENUM, ACQUIRED: ICD-10-CM

## 2022-08-08 DIAGNOSIS — M05.79 RHEUMATOID ARTHRITIS INVOLVING MULTIPLE SITES WITH POSITIVE RHEUMATOID FACTOR (HCC): ICD-10-CM

## 2022-08-08 DIAGNOSIS — K90.9 MALABSORPTION OF IRON: ICD-10-CM

## 2022-08-08 DIAGNOSIS — D50.0 IRON DEFICIENCY ANEMIA DUE TO CHRONIC BLOOD LOSS: ICD-10-CM

## 2022-08-08 DIAGNOSIS — G89.29 CHRONIC PAIN OF LEFT KNEE: Primary | ICD-10-CM

## 2022-08-08 DIAGNOSIS — M25.562 CHRONIC PAIN OF LEFT KNEE: Primary | ICD-10-CM

## 2022-08-08 DIAGNOSIS — Z51.81 MEDICATION MONITORING ENCOUNTER: ICD-10-CM

## 2022-08-08 DIAGNOSIS — M25.562 CHRONIC PAIN OF LEFT KNEE: ICD-10-CM

## 2022-08-08 DIAGNOSIS — G89.29 CHRONIC PAIN OF LEFT KNEE: ICD-10-CM

## 2022-08-08 DIAGNOSIS — D50.0 IRON DEFICIENCY ANEMIA DUE TO CHRONIC BLOOD LOSS: Primary | ICD-10-CM

## 2022-08-08 LAB
ALBUMIN SERPL ELPH-MCNC: 3.82 G/DL (ref 3.75–5.21)
ALBUMIN/GLOB SERPL: 1.2 {RATIO} (ref 1–2)
ALPHA1 GLOB SERPL ELPH-MCNC: 0.36 G/DL (ref 0.19–0.46)
ALPHA2 GLOB SERPL ELPH-MCNC: 0.81 G/DL (ref 0.48–1.05)
B-GLOBULIN SERPL ELPH-MCNC: 0.78 G/DL (ref 0.68–1.23)
DEPRECATED HBV CORE AB SER IA-ACNC: 271.4 NG/ML
GAMMA GLOB SERPL ELPH-MCNC: 1.23 G/DL (ref 0.62–1.7)
IRON SATN MFR SERPL: 17 %
IRON SERPL-MCNC: 58 UG/DL
NUCLEAR IGG TITR SER IF: NEGATIVE {TITER}
PROT SERPL-MCNC: 7 G/DL (ref 6.4–8.2)
TIBC SERPL-MCNC: 344 UG/DL (ref 240–450)
TRANSFERRIN SERPL-MCNC: 231 MG/DL (ref 200–360)

## 2022-08-08 PROCEDURE — 84466 ASSAY OF TRANSFERRIN: CPT

## 2022-08-08 PROCEDURE — 83540 ASSAY OF IRON: CPT

## 2022-08-08 PROCEDURE — 3008F BODY MASS INDEX DOCD: CPT | Performed by: INTERNAL MEDICINE

## 2022-08-08 PROCEDURE — 3078F DIAST BP <80 MM HG: CPT | Performed by: INTERNAL MEDICINE

## 2022-08-08 PROCEDURE — 99204 OFFICE O/P NEW MOD 45 MIN: CPT | Performed by: INTERNAL MEDICINE

## 2022-08-08 PROCEDURE — 82728 ASSAY OF FERRITIN: CPT

## 2022-08-08 PROCEDURE — 73560 X-RAY EXAM OF KNEE 1 OR 2: CPT | Performed by: INTERNAL MEDICINE

## 2022-08-08 PROCEDURE — 99214 OFFICE O/P EST MOD 30 MIN: CPT | Performed by: INTERNAL MEDICINE

## 2022-08-08 PROCEDURE — 3077F SYST BP >= 140 MM HG: CPT | Performed by: INTERNAL MEDICINE

## 2022-08-08 PROCEDURE — 36415 COLL VENOUS BLD VENIPUNCTURE: CPT

## 2022-08-08 PROCEDURE — 1125F AMNT PAIN NOTED PAIN PRSNT: CPT | Performed by: INTERNAL MEDICINE

## 2022-08-08 RX ORDER — LEFLUNOMIDE 20 MG/1
20 TABLET ORAL DAILY
Qty: 90 TABLET | Refills: 0 | Status: SHIPPED | OUTPATIENT
Start: 2022-08-08

## 2022-08-08 NOTE — PATIENT INSTRUCTIONS
You were seen today for rheumatoid arthritis  Hands seem to be better controlled  You are having some pain and swelling in your left knee, would recommend to see orthopedic  Continue prednisone 5 mg daily and leflunomide 20 mg daily  See me in 3 mos

## 2022-08-09 ENCOUNTER — TELEPHONE (OUTPATIENT)
Dept: HEMATOLOGY/ONCOLOGY | Facility: HOSPITAL | Age: 82
End: 2022-08-09

## 2022-08-09 NOTE — TELEPHONE ENCOUNTER
Spoke with Donnie Enedina, pt's daughter. Stated that Dr Tabby Joe is recommending IV iron when she returns in 8 weeks. It is approved. Will have scheduling reach out to set it up.

## 2022-08-15 ENCOUNTER — TELEPHONE (OUTPATIENT)
Dept: INTERNAL MEDICINE CLINIC | Facility: CLINIC | Age: 82
End: 2022-08-15

## 2022-08-19 RX ORDER — GLIPIZIDE 5 MG/1
TABLET ORAL
Qty: 180 TABLET | Refills: 1 | Status: SHIPPED | OUTPATIENT
Start: 2022-08-19

## 2022-08-22 ENCOUNTER — NURSE TRIAGE (OUTPATIENT)
Dept: INTERNAL MEDICINE CLINIC | Facility: CLINIC | Age: 82
End: 2022-08-22

## 2022-08-22 NOTE — TELEPHONE ENCOUNTER
Spoke to daughter. Advised appt with other provider. Daughter understood and appt made 8/23/22 with Dr Preeti Wise.

## 2022-08-23 ENCOUNTER — OFFICE VISIT (OUTPATIENT)
Dept: INTERNAL MEDICINE CLINIC | Facility: CLINIC | Age: 82
End: 2022-08-23
Payer: MEDICARE

## 2022-08-23 VITALS
HEIGHT: 61 IN | BODY MASS INDEX: 26.06 KG/M2 | DIASTOLIC BLOOD PRESSURE: 58 MMHG | SYSTOLIC BLOOD PRESSURE: 126 MMHG | OXYGEN SATURATION: 100 % | HEART RATE: 84 BPM | WEIGHT: 138 LBS | TEMPERATURE: 98 F

## 2022-08-23 DIAGNOSIS — M25.552 LEFT HIP PAIN: Primary | ICD-10-CM

## 2022-08-23 PROCEDURE — 3008F BODY MASS INDEX DOCD: CPT | Performed by: INTERNAL MEDICINE

## 2022-08-23 PROCEDURE — 3074F SYST BP LT 130 MM HG: CPT | Performed by: INTERNAL MEDICINE

## 2022-08-23 PROCEDURE — 1125F AMNT PAIN NOTED PAIN PRSNT: CPT | Performed by: INTERNAL MEDICINE

## 2022-08-23 PROCEDURE — 99214 OFFICE O/P EST MOD 30 MIN: CPT | Performed by: INTERNAL MEDICINE

## 2022-08-23 PROCEDURE — 3078F DIAST BP <80 MM HG: CPT | Performed by: INTERNAL MEDICINE

## 2022-08-23 RX ORDER — PREDNISONE 10 MG/1
TABLET ORAL
Qty: 11 TABLET | Refills: 0 | Status: SHIPPED | OUTPATIENT
Start: 2022-08-23

## 2022-08-23 RX ORDER — TRAMADOL HYDROCHLORIDE 50 MG/1
50 TABLET ORAL EVERY 12 HOURS PRN
Qty: 20 TABLET | Refills: 0 | Status: SHIPPED | OUTPATIENT
Start: 2022-08-23

## 2022-08-23 NOTE — PATIENT INSTRUCTIONS
Left hip pain/could be arthritis flareup versus radiation of pain from the lower back, I will give tapering dose of prednisone for few days,monitor blood suagr tramadol few pills only if the pain is severe, if not better follow-up with  Rheumatology    Left knee pain/ xr reviewed, prednisone tapering ,

## 2022-09-01 ENCOUNTER — OFFICE VISIT (OUTPATIENT)
Dept: ENDOCRINOLOGY CLINIC | Facility: CLINIC | Age: 82
End: 2022-09-01
Payer: MEDICARE

## 2022-09-01 VITALS
BODY MASS INDEX: 25 KG/M2 | DIASTOLIC BLOOD PRESSURE: 57 MMHG | HEART RATE: 79 BPM | SYSTOLIC BLOOD PRESSURE: 104 MMHG | WEIGHT: 134 LBS

## 2022-09-01 DIAGNOSIS — E11.42 TYPE 2 DIABETES MELLITUS WITH DIABETIC POLYNEUROPATHY, WITHOUT LONG-TERM CURRENT USE OF INSULIN (HCC): Primary | ICD-10-CM

## 2022-09-01 LAB
CARTRIDGE LOT#: NORMAL NUMERIC
GLUCOSE BLOOD: 232
TEST STRIP LOT #: NORMAL NUMERIC

## 2022-09-01 PROCEDURE — 99213 OFFICE O/P EST LOW 20 MIN: CPT | Performed by: NURSE PRACTITIONER

## 2022-09-01 PROCEDURE — 82947 ASSAY GLUCOSE BLOOD QUANT: CPT | Performed by: NURSE PRACTITIONER

## 2022-09-01 PROCEDURE — 3074F SYST BP LT 130 MM HG: CPT | Performed by: NURSE PRACTITIONER

## 2022-09-01 PROCEDURE — 3078F DIAST BP <80 MM HG: CPT | Performed by: NURSE PRACTITIONER

## 2022-09-01 PROCEDURE — 83036 HEMOGLOBIN GLYCOSYLATED A1C: CPT | Performed by: NURSE PRACTITIONER

## 2022-09-01 NOTE — PATIENT INSTRUCTIONS
A1C: 6.0% today --> decreased from 7.3% on 5/2/2022  Blood glucose: 232 in clinic today    Medications:   - continue with Glipizide 5mg once daily with breakfast     -increase activity as much as you can and feel safe     Weight:  Wt Readings from Last 6 Encounters:  09/01/22 : 134 lb (60.8 kg)  08/23/22 : 138 lb (62.6 kg)  08/08/22 : 142 lb (64.4 kg)  08/08/22 : 139 lb (63 kg)  07/14/22 : 139 lb (63 kg)  05/11/22 : 140 lb 3.2 oz (63.6 kg)    A1C goal:  <7.5%    Blood sugar testing:  Test your blood sugar 1 time daily   Recommended times to test: alternate with before breakfast (fasting) or 2hrs after meals (lunch or dinner)    Blood sugar targets:  Before breakfast:   (preferably < 110)  Before meals OR 2 hours after meals: <180 (preferably <150)      Call for persistent blood sugars < 75 or > 200

## 2022-09-09 ENCOUNTER — HOSPITAL ENCOUNTER (OUTPATIENT)
Dept: GENERAL RADIOLOGY | Age: 82
Discharge: HOME | End: 2022-09-09
Attending: INTERNAL MEDICINE
Payer: MEDICARE

## 2022-09-09 ENCOUNTER — OFFICE VISIT (OUTPATIENT)
Dept: INTERNAL MEDICINE CLINIC | Facility: CLINIC | Age: 82
End: 2022-09-09
Payer: MEDICARE

## 2022-09-09 VITALS
BODY MASS INDEX: 26.24 KG/M2 | SYSTOLIC BLOOD PRESSURE: 127 MMHG | RESPIRATION RATE: 16 BRPM | DIASTOLIC BLOOD PRESSURE: 55 MMHG | HEART RATE: 73 BPM | TEMPERATURE: 97 F | HEIGHT: 61 IN | WEIGHT: 139 LBS | OXYGEN SATURATION: 99 %

## 2022-09-09 DIAGNOSIS — M79.672 LEFT FOOT PAIN: ICD-10-CM

## 2022-09-09 DIAGNOSIS — I10 PRIMARY HYPERTENSION: ICD-10-CM

## 2022-09-09 DIAGNOSIS — M25.559 HIP PAIN: ICD-10-CM

## 2022-09-09 DIAGNOSIS — E78.5 DYSLIPIDEMIA: ICD-10-CM

## 2022-09-09 DIAGNOSIS — D50.9 IRON DEFICIENCY ANEMIA, UNSPECIFIED IRON DEFICIENCY ANEMIA TYPE: ICD-10-CM

## 2022-09-09 DIAGNOSIS — Z71.85 VACCINE COUNSELING: Primary | ICD-10-CM

## 2022-09-09 DIAGNOSIS — E11.9 TYPE 2 DIABETES MELLITUS WITHOUT RETINOPATHY (HCC): ICD-10-CM

## 2022-09-09 PROCEDURE — 3074F SYST BP LT 130 MM HG: CPT | Performed by: INTERNAL MEDICINE

## 2022-09-09 PROCEDURE — 3078F DIAST BP <80 MM HG: CPT | Performed by: INTERNAL MEDICINE

## 2022-09-09 PROCEDURE — 1125F AMNT PAIN NOTED PAIN PRSNT: CPT | Performed by: INTERNAL MEDICINE

## 2022-09-09 PROCEDURE — 73630 X-RAY EXAM OF FOOT: CPT | Performed by: INTERNAL MEDICINE

## 2022-09-09 PROCEDURE — 73610 X-RAY EXAM OF ANKLE: CPT | Performed by: INTERNAL MEDICINE

## 2022-09-09 PROCEDURE — 3008F BODY MASS INDEX DOCD: CPT | Performed by: INTERNAL MEDICINE

## 2022-09-09 PROCEDURE — 99215 OFFICE O/P EST HI 40 MIN: CPT | Performed by: INTERNAL MEDICINE

## 2022-09-12 ENCOUNTER — PATIENT MESSAGE (OUTPATIENT)
Dept: INTERNAL MEDICINE CLINIC | Facility: CLINIC | Age: 82
End: 2022-09-12

## 2022-09-13 ENCOUNTER — PATIENT MESSAGE (OUTPATIENT)
Dept: INTERNAL MEDICINE CLINIC | Facility: CLINIC | Age: 82
End: 2022-09-13

## 2022-09-13 RX ORDER — DULOXETIN HYDROCHLORIDE 20 MG/1
40 CAPSULE, DELAYED RELEASE ORAL DAILY
Qty: 2 CAPSULE | Refills: 0 | Status: SHIPPED | OUTPATIENT
Start: 2022-09-13

## 2022-09-13 RX ORDER — GLIPIZIDE 5 MG/1
TABLET ORAL
Qty: 90 TABLET | Refills: 1 | Status: SHIPPED | OUTPATIENT
Start: 2022-09-13

## 2022-09-13 NOTE — TELEPHONE ENCOUNTER
From: Vinod Aiken  To: Preston Barraza. 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 She has not been taking the 1/2 in the evening. Chirag Ramy also prescribed Duloxetine for the Neuropathy in her feet.    Sincerely,  Sugar Cobb

## 2022-09-26 RX ORDER — SPIRONOLACTONE 25 MG/1
50 TABLET ORAL DAILY
Qty: 180 TABLET | Refills: 1 | Status: SHIPPED | OUTPATIENT
Start: 2022-09-26 | End: 2023-05-12

## 2022-09-26 NOTE — TELEPHONE ENCOUNTER
Drug interaction with Valsartan  [facility] protocol.       Requested Prescriptions   Pending Prescriptions Disp Refills    SPIRONOLACTONE 25 MG Oral Tab [Pharmacy Med Name: SPIRONOLACTONE 25 MG TABLET] 180 tablet 1     Sig: TAKE 2 TABLETS BY MOUTH EVERY DAY        Hypertensive Medications Protocol Passed - 9/26/2022  4:13 PM        Passed - In person appointment in the past 12 or next 3 months       Recent Outpatient Visits              2 weeks ago Vaccine counseling    [facility], Danielle Jimenez MD    Office Visit    3 weeks ago Type 2 diabetes mellitus with diabetic polyneuropathy, without long-term current use of insulin [facility])    [facility] Endocrinology Robina Cuenca, APRN    Office Visit    1 month ago Left hip pain    [facility], [address] Claudette Cower, MD    Office Visit    1 month ago Iron deficiency anemia due to chronic blood loss    [facility] Hematology Oncology Sea Mckenzie MD    Office Visit    1 month ago Chronic pain of left knee    [facility], [address], Anu Pichardo MD    Office Visit     Future Appointments         Provider Department Appt Notes    In 1 week EM [address] - Infusion SD CBC Ferritin Iron [id number] ***Pt is a very very hard stick, so we may need to get the vascular team involved***    In 1 week Li [address] Hematology Oncology FOLLOW UP    In 1 week EM CC INFRN [address] [facility] PIV-MYJ    In 2 weeks Daron Carbajal, [facility], [address] 3 mos f/u    In 1 month Enedelia Mccann, [address] [facility], [address], [place] 3 mo fu    In 5 months Roise Meigs., MD [facility], [address] BP reading less than 140/90     BP Readings from Last 1 Encounters:  09/09/22 : 127/55                Passed - CMP or BMP in past 6 months     Recent Results (from the past 4392 hour(s))   COMP METABOLIC PANEL (14)    Collection Time: 08/05/22  1:16 PM   Result Value Ref Range    Glucose 123 (H) 70 - 99 mg/dL    Sodium 139 136 - 145 mmol/L    Potassium 3.6 3.5 - 5.1 mmol/L    Chloride 108 98 - 112 mmol/L    CO2 24.0 21.0 - 32.0 mmol/L    Anion Gap 7 0 - 18 mmol/L    BUN 18 7 - 18 mg/dL    Creatinine 0.74 0.55 - 1.02 mg/dL    BUN/CREA Ratio 24.3 (H) 10.0 - 20.0    Calcium, Total 9.0 8.5 - 10.1 mg/dL    Calculated Osmolality 291 275 - 295 mOsm/kg    eGFR-Cr 80 >=60 mL/min/1.73m2    ALT 18 13 - 56 U/L    AST 23 15 - 37 U/L    Alkaline Phosphatase 63 55 - 142 U/L    Bilirubin, Total 0.2 0.1 - 2.0 mg/dL    Total Protein 7.2 6.4 - 8.2 g/dL    Albumin 3.1 (L) 3.4 - 5.0 g/dL    Globulin  4.1 2.8 - 4.4 g/dL    A/G Ratio 0.8 (L) 1.0 - 2.0    Patient Fasting for CMP? No      Note: Due to a large number of results and/or encounters for the requested time period, some results have not been displayed. A complete set of results can be found in Results Review.                  Passed - In person appointment or virtual visit in the past 6 months       Recent Outpatient Visits              2 weeks ago Vaccine counseling    [place], [address] Santana Ruiz MD    Office Visit    3 weeks ago Type 2 diabetes mellitus with diabetic polyneuropathy, without long-term current use of insulin [facility])    [facility] Endocrinology BILL Stahl    Office Visit    1 month ago Left hip pain    [place] [facility], Romuol Laughlin MD    Office Visit    1 month ago Iron deficiency anemia due to chronic blood loss    [facility] Oncology Isrrael Astorga MD    Office Visit    1 month ago Chronic pain of left knee    [facility] Richa Roy [facility]    Office Visit     Future Appointments Provider Department Appt Notes    In 1 week EM [address] - Infusion [place] CBC Ferritin Iron [id number] ***Pt is a very very hard stick, so we may need to get the vascular team involved***    In 1 week Edita Vasquez [address] Hematology Oncology FOLLOW UP    In 1 week EM CC INFRN [address] PIV-MYJ    In 2 weeks [facility], [address] 3 mos f/u    In 1 month [place] [facility], [address] [facility], [address], [facility] 3 mo fu    In 5 months She Cardozo MD [facility], [place], [facility], [address] [place] [address] or GFRAA > 50     GFR Evaluation  EGFRCR: 80 , resulted on 8/5/2022                   Future Appointments         Provider Department Appt Notes    In 1 week EM [address] - Infusion [place] CBC Ferritin Iron [id number] ***Pt is a very very hard stick, so we may need to get the vascular team involved***    In 1 week Edita IvyLi card 19 Hematology Oncology FOLLOW UP    In 1 week EM CC INFRN [address] PIV-MYJ    In 2 weeks [facility], [address] 3 mos f/u    In 1 month [place], [address] [facility], [address], [facility] 3 mo fu    In 5 months She Cardozo MD [facility] [place], [facility], Pending [facility] to [facility] [place] , [place]             Recent Outpatient Visits              2 weeks ago Vaccine counseling    [facility], [place], [facility], [address] Christopher Stoner MD    Office Visit    3 weeks ago Type 2 diabetes mellitus with diabetic polyneuropathy, without long-term current use of insulin [facility])    [facility] [place], [facility] Endocrinology BILL Ramsay    Office Visit    1 month ago Left hip pain    [facility], [place], [facility], Lata Neves MD    Office Visit 1 month ago Iron deficiency anemia due to chronic blood loss    [facility] Hematology Oncology Alfredo Guteirrez MD    Office Visit    1 month ago Chronic pain of left knee    [facility] [place] [facility] Yodit Greco MD    Office Visit

## 2022-09-27 RX ORDER — SPIRONOLACTONE 25 MG/1
50 TABLET ORAL DAILY
Qty: 180 TABLET | Refills: 1 | OUTPATIENT
Start: 2022-09-27

## 2022-10-04 ENCOUNTER — NURSE ONLY (OUTPATIENT)
Dept: HEMATOLOGY/ONCOLOGY | Facility: HOSPITAL | Age: 82
End: 2022-10-04
Attending: INTERNAL MEDICINE
Payer: MEDICARE

## 2022-10-04 ENCOUNTER — TELEPHONE (OUTPATIENT)
Dept: INTERVENTIONAL RADIOLOGY/VASCULAR | Facility: HOSPITAL | Age: 82
End: 2022-10-04

## 2022-10-04 VITALS
TEMPERATURE: 98 F | RESPIRATION RATE: 16 BRPM | OXYGEN SATURATION: 100 % | BODY MASS INDEX: 26.93 KG/M2 | HEIGHT: 61 IN | SYSTOLIC BLOOD PRESSURE: 152 MMHG | WEIGHT: 142.63 LBS | HEART RATE: 85 BPM | DIASTOLIC BLOOD PRESSURE: 57 MMHG

## 2022-10-04 DIAGNOSIS — K31.819 AVM (ARTERIOVENOUS MALFORMATION) OF DUODENUM, ACQUIRED: ICD-10-CM

## 2022-10-04 DIAGNOSIS — D50.0 IRON DEFICIENCY ANEMIA DUE TO CHRONIC BLOOD LOSS: ICD-10-CM

## 2022-10-04 DIAGNOSIS — I87.8 POOR VENOUS ACCESS: ICD-10-CM

## 2022-10-04 DIAGNOSIS — D50.0 IRON DEFICIENCY ANEMIA DUE TO CHRONIC BLOOD LOSS: Primary | ICD-10-CM

## 2022-10-04 DIAGNOSIS — K90.9 MALABSORPTION OF IRON: ICD-10-CM

## 2022-10-04 LAB
BASOPHILS # BLD AUTO: 0.04 X10(3) UL (ref 0–0.2)
BASOPHILS NFR BLD AUTO: 0.4 %
DEPRECATED HBV CORE AB SER IA-ACNC: 56.4 NG/ML
DEPRECATED RDW RBC AUTO: 50.6 FL (ref 35.1–46.3)
EOSINOPHIL # BLD AUTO: 0.12 X10(3) UL (ref 0–0.7)
EOSINOPHIL NFR BLD AUTO: 1.2 %
ERYTHROCYTE [DISTWIDTH] IN BLOOD BY AUTOMATED COUNT: 14.7 % (ref 11–15)
HCT VFR BLD AUTO: 30.7 %
HGB BLD-MCNC: 9.7 G/DL
IMM GRANULOCYTES # BLD AUTO: 0.07 X10(3) UL (ref 0–1)
IMM GRANULOCYTES NFR BLD: 0.7 %
IRON SATN MFR SERPL: 10 %
IRON SERPL-MCNC: 40 UG/DL
LYMPHOCYTES # BLD AUTO: 2.2 X10(3) UL (ref 1–4)
LYMPHOCYTES NFR BLD AUTO: 22.6 %
MCH RBC QN AUTO: 29.8 PG (ref 26–34)
MCHC RBC AUTO-ENTMCNC: 31.6 G/DL (ref 31–37)
MCV RBC AUTO: 94.5 FL
MONOCYTES # BLD AUTO: 0.59 X10(3) UL (ref 0.1–1)
MONOCYTES NFR BLD AUTO: 6.1 %
NEUTROPHILS # BLD AUTO: 6.72 X10 (3) UL (ref 1.5–7.7)
NEUTROPHILS # BLD AUTO: 6.72 X10(3) UL (ref 1.5–7.7)
NEUTROPHILS NFR BLD AUTO: 69 %
PLATELET # BLD AUTO: 270 10(3)UL (ref 150–450)
RBC # BLD AUTO: 3.25 X10(6)UL
TIBC SERPL-MCNC: 396 UG/DL (ref 240–450)
TRANSFERRIN SERPL-MCNC: 266 MG/DL (ref 200–360)
WBC # BLD AUTO: 9.7 X10(3) UL (ref 4–11)

## 2022-10-04 PROCEDURE — 80053 COMPREHEN METABOLIC PANEL: CPT | Performed by: INTERNAL MEDICINE

## 2022-10-04 PROCEDURE — 82728 ASSAY OF FERRITIN: CPT

## 2022-10-04 PROCEDURE — 99214 OFFICE O/P EST MOD 30 MIN: CPT | Performed by: INTERNAL MEDICINE

## 2022-10-04 PROCEDURE — 83540 ASSAY OF IRON: CPT

## 2022-10-04 PROCEDURE — 99211 OFF/OP EST MAY X REQ PHY/QHP: CPT

## 2022-10-04 PROCEDURE — 80061 LIPID PANEL: CPT | Performed by: INTERNAL MEDICINE

## 2022-10-04 PROCEDURE — 36415 COLL VENOUS BLD VENIPUNCTURE: CPT

## 2022-10-04 PROCEDURE — 84466 ASSAY OF TRANSFERRIN: CPT

## 2022-10-04 PROCEDURE — 85025 COMPLETE CBC W/AUTO DIFF WBC: CPT

## 2022-10-04 NOTE — PROGRESS NOTES
Pt here for 0930 labs, Dr Arlet Yoon 1030, infusion venofer 400mg at 1130  Difficult IV stick in past  Informed charge RN Monica Nassar and clinical leader Mirtha Varghese RN also helped  Unable to obtain PIV, veins very small, diff to see with vein finder even after warming    Mirtha Reyez RN successfully obtained lab work for today with 25g butterfly. Pt tolerated all very well, good spirits. Discharged stable to hem/ onc room to see Dr Arlet Yoon.   Pt's daughter with her  Pt agreeable to have a port placed and will discuss w/ MD

## 2022-10-07 ENCOUNTER — TELEPHONE (OUTPATIENT)
Dept: HEMATOLOGY/ONCOLOGY | Facility: HOSPITAL | Age: 82
End: 2022-10-07

## 2022-10-07 NOTE — TELEPHONE ENCOUNTER
Daughter rut calling asking about lab results and if her mom will need to continue with her iron infusions.  Martha Mclaughlin

## 2022-10-07 NOTE — TELEPHONE ENCOUNTER
Retuned call to Melanie. Explained the usage of the port. Mom is hesitant but will go through with it.

## 2022-10-10 ENCOUNTER — TELEPHONE (OUTPATIENT)
Dept: HEMATOLOGY/ONCOLOGY | Facility: HOSPITAL | Age: 82
End: 2022-10-10

## 2022-10-10 NOTE — TELEPHONE ENCOUNTER
Returned call to Melanie. Let her know that we will have the port authorized prior to the procedure. If not, we will cancel it. She verbalized understanding.

## 2022-10-11 ENCOUNTER — HOSPITAL ENCOUNTER (OUTPATIENT)
Dept: INTERVENTIONAL RADIOLOGY/VASCULAR | Facility: HOSPITAL | Age: 82
Discharge: HOME | End: 2022-10-11
Attending: INTERNAL MEDICINE | Admitting: RADIOLOGY
Payer: MEDICARE

## 2022-10-11 ENCOUNTER — TELEPHONE (OUTPATIENT)
Dept: HEMATOLOGY/ONCOLOGY | Facility: HOSPITAL | Age: 82
End: 2022-10-11

## 2022-10-11 VITALS
BODY MASS INDEX: 26.81 KG/M2 | HEIGHT: 61 IN | WEIGHT: 142 LBS | HEART RATE: 76 BPM | OXYGEN SATURATION: 98 % | SYSTOLIC BLOOD PRESSURE: 117 MMHG | DIASTOLIC BLOOD PRESSURE: 56 MMHG | RESPIRATION RATE: 21 BRPM

## 2022-10-11 DIAGNOSIS — Z51.81 MEDICATION MONITORING ENCOUNTER: ICD-10-CM

## 2022-10-11 DIAGNOSIS — M05.79 RHEUMATOID ARTHRITIS INVOLVING MULTIPLE SITES WITH POSITIVE RHEUMATOID FACTOR (HCC): ICD-10-CM

## 2022-10-11 DIAGNOSIS — I87.8 POOR VENOUS ACCESS: ICD-10-CM

## 2022-10-11 LAB
GLUCOSE BLDC GLUCOMTR-MCNC: 128 MG/DL (ref 70–99)
SARS-COV-2 RNA RESP QL NAA+PROBE: NOT DETECTED

## 2022-10-11 PROCEDURE — 02HV33Z INSERTION OF INFUSION DEVICE INTO SUPERIOR VENA CAVA, PERCUTANEOUS APPROACH: ICD-10-PCS | Performed by: RADIOLOGY

## 2022-10-11 PROCEDURE — 82962 GLUCOSE BLOOD TEST: CPT

## 2022-10-11 PROCEDURE — 36561 INSERT TUNNELED CV CATH: CPT

## 2022-10-11 PROCEDURE — 77001 FLUOROGUIDE FOR VEIN DEVICE: CPT

## 2022-10-11 PROCEDURE — 99152 MOD SED SAME PHYS/QHP 5/>YRS: CPT

## 2022-10-11 PROCEDURE — 0JH60WZ INSERTION OF TOTALLY IMPLANTABLE VASCULAR ACCESS DEVICE INTO CHEST SUBCUTANEOUS TISSUE AND FASCIA, OPEN APPROACH: ICD-10-PCS | Performed by: RADIOLOGY

## 2022-10-11 RX ORDER — CLINDAMYCIN PHOSPHATE 900 MG/50ML
INJECTION INTRAVENOUS
Status: COMPLETED
Start: 2022-10-11 | End: 2022-10-11

## 2022-10-11 RX ORDER — PREDNISONE 1 MG/1
5 TABLET ORAL DAILY
Qty: 90 TABLET | Refills: 0 | Status: SHIPPED | OUTPATIENT
Start: 2022-10-11

## 2022-10-11 RX ORDER — MIDAZOLAM HYDROCHLORIDE 1 MG/ML
INJECTION INTRAMUSCULAR; INTRAVENOUS
Status: COMPLETED
Start: 2022-10-11 | End: 2022-10-11

## 2022-10-11 RX ORDER — HEPARIN SODIUM (PORCINE) LOCK FLUSH IV SOLN 100 UNIT/ML 100 UNIT/ML
SOLUTION INTRAVENOUS
Status: COMPLETED
Start: 2022-10-11 | End: 2022-10-11

## 2022-10-11 RX ORDER — SODIUM CHLORIDE 9 MG/ML
INJECTION, SOLUTION INTRAVENOUS CONTINUOUS
Status: DISCONTINUED | OUTPATIENT
Start: 2022-10-11 | End: 2022-10-11

## 2022-10-11 RX ORDER — LIDOCAINE HYDROCHLORIDE 20 MG/ML
INJECTION, SOLUTION EPIDURAL; INFILTRATION; INTRACAUDAL; PERINEURAL
Status: COMPLETED
Start: 2022-10-11 | End: 2022-10-11

## 2022-10-11 RX ORDER — CEFAZOLIN SODIUM/WATER 2 G/20 ML
SYRINGE (ML) INTRAVENOUS
Status: DISCONTINUED
Start: 2022-10-11 | End: 2022-10-11 | Stop reason: WASHOUT

## 2022-10-11 RX ORDER — HEPARIN SODIUM (PORCINE) LOCK FLUSH IV SOLN 100 UNIT/ML 100 UNIT/ML
SOLUTION INTRAVENOUS
Status: DISCONTINUED
Start: 2022-10-11 | End: 2022-10-11

## 2022-10-11 NOTE — INTERVAL H&P NOTE
The above referenced H&P was reviewed by Tanya Gama MD on 10/11/2022, the patient was examined and no significant changes have occurred in the patient's condition since the H&P was performed. Risks, benefits, alternative treatments and consequences of no treatment were discussed. We will proceed with procedure as planned.       Tanya Gama MD  10/11/2022  1:19 PM

## 2022-10-11 NOTE — INTERVAL H&P NOTE
The above referenced H&P was reviewed by Tonie Sanabria MD on 10/11/2022, the patient was examined and no significant changes have occurred in the patient's condition since the H&P was performed. Risks, benefits, alternative treatments and consequences of no treatment were discussed. We will proceed with procedure as planned.       Tonie Sanabria MD  10/11/2022  1:19 PM

## 2022-10-11 NOTE — TELEPHONE ENCOUNTER
Called patient no answer left message on voicemail to call and schedule appointment that was ordered by Dr Chu Ramsey.

## 2022-10-11 NOTE — IVS NOTE
DISCHARGE NOTE     Pt is able to sit up and ambulate without difficulty. Pt voided and tolerated fluids and food. Procedural site remains dry and intact with No signs and symptoms of bleeding/hematoma noted. IV antibiotic completed. Port flush with saline and heparin flush. No complications noted. IV access removed  Instruction provided, patient/family verbalizes understanding. Dr. Clarice Del Rio spoke with patient post procedure.    Pt discharge via wheelchair to [facility]

## 2022-10-18 RX ORDER — DULOXETIN HYDROCHLORIDE 20 MG/1
CAPSULE, DELAYED RELEASE ORAL
Qty: 180 CAPSULE | Refills: 0 | Status: SHIPPED | OUTPATIENT
Start: 2022-10-18

## 2022-10-19 ENCOUNTER — OFFICE VISIT (OUTPATIENT)
Dept: HEMATOLOGY/ONCOLOGY | Facility: HOSPITAL | Age: 82
End: 2022-10-19
Attending: INTERNAL MEDICINE
Payer: MEDICARE

## 2022-10-19 VITALS
TEMPERATURE: 99 F | OXYGEN SATURATION: 100 % | SYSTOLIC BLOOD PRESSURE: 120 MMHG | RESPIRATION RATE: 18 BRPM | DIASTOLIC BLOOD PRESSURE: 47 MMHG | HEART RATE: 73 BPM

## 2022-10-19 DIAGNOSIS — K90.9 MALABSORPTION OF IRON: ICD-10-CM

## 2022-10-19 DIAGNOSIS — D50.0 IRON DEFICIENCY ANEMIA DUE TO CHRONIC BLOOD LOSS: Primary | ICD-10-CM

## 2022-10-19 DIAGNOSIS — Z95.828 PORT-A-CATH IN PLACE: ICD-10-CM

## 2022-10-19 PROCEDURE — 96365 THER/PROPH/DIAG IV INF INIT: CPT

## 2022-10-19 RX ORDER — HEPARIN SODIUM (PORCINE) LOCK FLUSH IV SOLN 100 UNIT/ML 100 UNIT/ML
5 SOLUTION INTRAVENOUS ONCE
OUTPATIENT
Start: 2022-10-26

## 2022-10-19 RX ORDER — HEPARIN SODIUM (PORCINE) LOCK FLUSH IV SOLN 100 UNIT/ML 100 UNIT/ML
5 SOLUTION INTRAVENOUS ONCE
Status: COMPLETED | OUTPATIENT
Start: 2022-10-19 | End: 2022-10-19

## 2022-10-19 RX ORDER — SODIUM CHLORIDE 9 MG/ML
10 INJECTION INTRAVENOUS ONCE
OUTPATIENT
Start: 2022-10-26

## 2022-10-19 RX ORDER — HEPARIN SODIUM (PORCINE) LOCK FLUSH IV SOLN 100 UNIT/ML 100 UNIT/ML
SOLUTION INTRAVENOUS
Status: COMPLETED
Start: 2022-10-19 | End: 2022-10-19

## 2022-10-19 RX ORDER — SODIUM CHLORIDE 9 MG/ML
10 INJECTION INTRAVENOUS ONCE
OUTPATIENT
Start: 2022-10-19

## 2022-10-19 RX ADMIN — HEPARIN SODIUM (PORCINE) LOCK FLUSH IV SOLN 100 UNIT/ML 500 UNITS: 100 SOLUTION INTRAVENOUS at 15:52:00

## 2022-10-19 NOTE — PROGRESS NOTES
Patient arrives for [facility] infusion 1 of 2. Reports fatigue. Previously had received iron (venofer). Patient with recent port placement as of 10/11. Site assessed - slightly tender but otherwise no redness or pain. Port accessed without complications and with + blood return. Reviewed plan of care including length of infusion, observation, and potential s/s of adverse reaction. Patient appeared to tolerated infusion, 30 min post vital signs WNL. Port needle removed and discharged in stable condition - will return next week for 2nd dose.

## 2022-10-26 ENCOUNTER — OFFICE VISIT (OUTPATIENT)
Dept: HEMATOLOGY/ONCOLOGY | Facility: HOSPITAL | Age: 82
End: 2022-10-26
Attending: INTERNAL MEDICINE
Payer: MEDICARE

## 2022-10-26 VITALS
TEMPERATURE: 98 F | OXYGEN SATURATION: 97 % | SYSTOLIC BLOOD PRESSURE: 131 MMHG | RESPIRATION RATE: 18 BRPM | HEART RATE: 82 BPM | DIASTOLIC BLOOD PRESSURE: 55 MMHG

## 2022-10-26 DIAGNOSIS — D50.0 IRON DEFICIENCY ANEMIA DUE TO CHRONIC BLOOD LOSS: Primary | ICD-10-CM

## 2022-10-26 DIAGNOSIS — K90.9 MALABSORPTION OF IRON: ICD-10-CM

## 2022-10-26 DIAGNOSIS — M05.79 RHEUMATOID ARTHRITIS INVOLVING MULTIPLE SITES WITH POSITIVE RHEUMATOID FACTOR (HCC): ICD-10-CM

## 2022-10-26 DIAGNOSIS — Z95.828 PORT-A-CATH IN PLACE: ICD-10-CM

## 2022-10-26 DIAGNOSIS — Z51.81 MEDICATION MONITORING ENCOUNTER: ICD-10-CM

## 2022-10-26 LAB
ALBUMIN SERPL-MCNC: 3.2 G/DL (ref 3.4–5)
ALT SERPL-CCNC: 20 U/L
AST SERPL-CCNC: 19 U/L (ref 15–37)
BASOPHILS # BLD AUTO: 0.07 X10(3) UL (ref 0–0.2)
BASOPHILS NFR BLD AUTO: 0.6 %
CREAT BLD-MCNC: 0.99 MG/DL
CRP SERPL-MCNC: 1.55 MG/DL (ref ?–0.3)
DEPRECATED RDW RBC AUTO: 50.5 FL (ref 35.1–46.3)
EOSINOPHIL # BLD AUTO: 0.19 X10(3) UL (ref 0–0.7)
EOSINOPHIL NFR BLD AUTO: 1.6 %
ERYTHROCYTE [DISTWIDTH] IN BLOOD BY AUTOMATED COUNT: 14.6 % (ref 11–15)
ERYTHROCYTE [SEDIMENTATION RATE] IN BLOOD: 47 MM/HR
GFR SERPLBLD BASED ON 1.73 SQ M-ARVRAT: 57 ML/MIN/1.73M2 (ref 60–?)
HCT VFR BLD AUTO: 28.1 %
HGB BLD-MCNC: 8.7 G/DL
IMM GRANULOCYTES # BLD AUTO: 0.17 X10(3) UL (ref 0–1)
IMM GRANULOCYTES NFR BLD: 1.4 %
LYMPHOCYTES # BLD AUTO: 2.04 X10(3) UL (ref 1–4)
LYMPHOCYTES NFR BLD AUTO: 16.9 %
MCH RBC QN AUTO: 29.7 PG (ref 26–34)
MCHC RBC AUTO-ENTMCNC: 31 G/DL (ref 31–37)
MCV RBC AUTO: 95.9 FL
MONOCYTES # BLD AUTO: 1.08 X10(3) UL (ref 0.1–1)
MONOCYTES NFR BLD AUTO: 9 %
NEUTROPHILS # BLD AUTO: 8.51 X10 (3) UL (ref 1.5–7.7)
NEUTROPHILS # BLD AUTO: 8.51 X10(3) UL (ref 1.5–7.7)
NEUTROPHILS NFR BLD AUTO: 70.5 %
PLATELET # BLD AUTO: 300 10(3)UL (ref 150–450)
RBC # BLD AUTO: 2.93 X10(6)UL
WBC # BLD AUTO: 12.1 X10(3) UL (ref 4–11)

## 2022-10-26 PROCEDURE — 96374 THER/PROPH/DIAG INJ IV PUSH: CPT

## 2022-10-26 PROCEDURE — 85025 COMPLETE CBC W/AUTO DIFF WBC: CPT

## 2022-10-26 PROCEDURE — 84460 ALANINE AMINO (ALT) (SGPT): CPT

## 2022-10-26 PROCEDURE — 82565 ASSAY OF CREATININE: CPT

## 2022-10-26 PROCEDURE — 85652 RBC SED RATE AUTOMATED: CPT

## 2022-10-26 PROCEDURE — 84450 TRANSFERASE (AST) (SGOT): CPT

## 2022-10-26 PROCEDURE — 86140 C-REACTIVE PROTEIN: CPT

## 2022-10-26 PROCEDURE — 82040 ASSAY OF SERUM ALBUMIN: CPT

## 2022-10-26 RX ORDER — HEPARIN SODIUM (PORCINE) LOCK FLUSH IV SOLN 100 UNIT/ML 100 UNIT/ML
5 SOLUTION INTRAVENOUS ONCE
Status: DISCONTINUED | OUTPATIENT
Start: 2022-10-26 | End: 2022-10-26

## 2022-10-26 RX ORDER — SODIUM CHLORIDE 9 MG/ML
10 INJECTION INTRAVENOUS ONCE
OUTPATIENT
Start: 2022-10-26

## 2022-10-26 RX ORDER — HEPARIN SODIUM (PORCINE) LOCK FLUSH IV SOLN 100 UNIT/ML 100 UNIT/ML
5 SOLUTION INTRAVENOUS ONCE
OUTPATIENT
Start: 2022-10-26

## 2022-10-26 RX ORDER — HEPARIN SODIUM (PORCINE) LOCK FLUSH IV SOLN 100 UNIT/ML 100 UNIT/ML
SOLUTION INTRAVENOUS
Status: DISCONTINUED
Start: 2022-10-26 | End: 2022-10-26

## 2022-10-26 NOTE — PROGRESS NOTES
Injectafer given today with no s/s of adverse reaction noted. Post vs after 30 minute observation wnl. Port flushed and deaccessed. Discharged stable rtc to clinic as indicated.

## 2022-11-01 ENCOUNTER — IMMUNIZATION (OUTPATIENT)
Dept: LAB | Age: 82
End: 2022-11-01
Attending: EMERGENCY MEDICINE
Payer: MEDICARE

## 2022-11-01 DIAGNOSIS — Z23 NEED FOR VACCINATION: Primary | ICD-10-CM

## 2022-11-01 PROCEDURE — 0124A SARSCOV2 VAC BVL 30MCG/0.3ML: CPT

## 2022-11-14 ENCOUNTER — OFFICE VISIT (OUTPATIENT)
Dept: RHEUMATOLOGY | Facility: CLINIC | Age: 82
End: 2022-11-14
Payer: MEDICARE

## 2022-11-14 VITALS
BODY MASS INDEX: 26 KG/M2 | WEIGHT: 139 LBS | HEART RATE: 98 BPM | SYSTOLIC BLOOD PRESSURE: 126 MMHG | DIASTOLIC BLOOD PRESSURE: 77 MMHG

## 2022-11-14 DIAGNOSIS — D50.0 IRON DEFICIENCY ANEMIA DUE TO CHRONIC BLOOD LOSS: ICD-10-CM

## 2022-11-14 DIAGNOSIS — M05.79 RHEUMATOID ARTHRITIS INVOLVING MULTIPLE SITES WITH POSITIVE RHEUMATOID FACTOR (HCC): Primary | ICD-10-CM

## 2022-11-14 DIAGNOSIS — Z51.81 MEDICATION MONITORING ENCOUNTER: ICD-10-CM

## 2022-11-14 PROCEDURE — 99214 OFFICE O/P EST MOD 30 MIN: CPT | Performed by: INTERNAL MEDICINE

## 2022-11-14 PROCEDURE — 1125F AMNT PAIN NOTED PAIN PRSNT: CPT | Performed by: INTERNAL MEDICINE

## 2022-11-14 PROCEDURE — 3074F SYST BP LT 130 MM HG: CPT | Performed by: INTERNAL MEDICINE

## 2022-11-14 PROCEDURE — 3078F DIAST BP <80 MM HG: CPT | Performed by: INTERNAL MEDICINE

## 2022-11-14 NOTE — PATIENT INSTRUCTIONS
You were seen for RA  Continue leflunomide 20 mg daily and prednisone 5 mg daily  Blood work in  Feb  See me in Feb

## 2022-12-01 ENCOUNTER — NURSE ONLY (OUTPATIENT)
Dept: HEMATOLOGY/ONCOLOGY | Facility: HOSPITAL | Age: 82
End: 2022-12-01
Attending: INTERNAL MEDICINE
Payer: MEDICARE

## 2022-12-01 VITALS
HEART RATE: 103 BPM | WEIGHT: 139 LBS | TEMPERATURE: 98 F | SYSTOLIC BLOOD PRESSURE: 164 MMHG | DIASTOLIC BLOOD PRESSURE: 75 MMHG | HEIGHT: 61 IN | BODY MASS INDEX: 26.24 KG/M2 | OXYGEN SATURATION: 100 % | RESPIRATION RATE: 16 BRPM

## 2022-12-01 DIAGNOSIS — D50.0 IRON DEFICIENCY ANEMIA DUE TO CHRONIC BLOOD LOSS: ICD-10-CM

## 2022-12-01 DIAGNOSIS — D50.0 IRON DEFICIENCY ANEMIA DUE TO CHRONIC BLOOD LOSS: Primary | ICD-10-CM

## 2022-12-01 DIAGNOSIS — K90.9 MALABSORPTION OF IRON: ICD-10-CM

## 2022-12-01 DIAGNOSIS — K31.819 AVM (ARTERIOVENOUS MALFORMATION) OF DUODENUM, ACQUIRED: ICD-10-CM

## 2022-12-01 DIAGNOSIS — Z95.828 PORT-A-CATH IN PLACE: ICD-10-CM

## 2022-12-01 LAB
BASOPHILS # BLD AUTO: 0.04 X10(3) UL (ref 0–0.2)
BASOPHILS NFR BLD AUTO: 0.4 %
DEPRECATED HBV CORE AB SER IA-ACNC: 1168.3 NG/ML
DEPRECATED RDW RBC AUTO: 68.5 FL (ref 35.1–46.3)
EOSINOPHIL # BLD AUTO: 0.16 X10(3) UL (ref 0–0.7)
EOSINOPHIL NFR BLD AUTO: 1.7 %
ERYTHROCYTE [DISTWIDTH] IN BLOOD BY AUTOMATED COUNT: 18.5 % (ref 11–15)
HCT VFR BLD AUTO: 29.8 %
HGB BLD-MCNC: 9.1 G/DL
IMM GRANULOCYTES # BLD AUTO: 0.13 X10(3) UL (ref 0–1)
IMM GRANULOCYTES NFR BLD: 1.3 %
IRON SATN MFR SERPL: 23 %
IRON SERPL-MCNC: 57 UG/DL
LYMPHOCYTES # BLD AUTO: 1.42 X10(3) UL (ref 1–4)
LYMPHOCYTES NFR BLD AUTO: 14.7 %
MCH RBC QN AUTO: 31.2 PG (ref 26–34)
MCHC RBC AUTO-ENTMCNC: 30.5 G/DL (ref 31–37)
MCV RBC AUTO: 102.1 FL
MONOCYTES # BLD AUTO: 0.85 X10(3) UL (ref 0.1–1)
MONOCYTES NFR BLD AUTO: 8.8 %
NEUTROPHILS # BLD AUTO: 7.04 X10 (3) UL (ref 1.5–7.7)
NEUTROPHILS # BLD AUTO: 7.04 X10(3) UL (ref 1.5–7.7)
NEUTROPHILS NFR BLD AUTO: 73.1 %
PLATELET # BLD AUTO: 370 10(3)UL (ref 150–450)
PLATELET MORPHOLOGY: NORMAL
RBC # BLD AUTO: 2.92 X10(6)UL
TIBC SERPL-MCNC: 247 UG/DL (ref 240–450)
TRANSFERRIN SERPL-MCNC: 166 MG/DL (ref 200–360)
WBC # BLD AUTO: 9.6 X10(3) UL (ref 4–11)

## 2022-12-01 PROCEDURE — 83540 ASSAY OF IRON: CPT

## 2022-12-01 PROCEDURE — 96374 THER/PROPH/DIAG INJ IV PUSH: CPT

## 2022-12-01 PROCEDURE — 99214 OFFICE O/P EST MOD 30 MIN: CPT | Performed by: INTERNAL MEDICINE

## 2022-12-01 PROCEDURE — 84466 ASSAY OF TRANSFERRIN: CPT

## 2022-12-01 PROCEDURE — 85025 COMPLETE CBC W/AUTO DIFF WBC: CPT

## 2022-12-01 PROCEDURE — 82728 ASSAY OF FERRITIN: CPT

## 2022-12-01 RX ORDER — HEPARIN SODIUM (PORCINE) LOCK FLUSH IV SOLN 100 UNIT/ML 100 UNIT/ML
5 SOLUTION INTRAVENOUS ONCE
OUTPATIENT
Start: 2022-12-08

## 2022-12-01 RX ORDER — HEPARIN SODIUM (PORCINE) LOCK FLUSH IV SOLN 100 UNIT/ML 100 UNIT/ML
5 SOLUTION INTRAVENOUS ONCE
Status: COMPLETED | OUTPATIENT
Start: 2022-12-01 | End: 2022-12-01

## 2022-12-01 RX ORDER — SODIUM CHLORIDE 9 MG/ML
10 INJECTION INTRAVENOUS ONCE
OUTPATIENT
Start: 2022-12-08

## 2022-12-01 RX ORDER — HEPARIN SODIUM (PORCINE) LOCK FLUSH IV SOLN 100 UNIT/ML 100 UNIT/ML
SOLUTION INTRAVENOUS
Status: DISPENSED
Start: 2022-12-01 | End: 2022-12-02

## 2022-12-01 RX ADMIN — HEPARIN SODIUM (PORCINE) LOCK FLUSH IV SOLN 100 UNIT/ML 500 UNITS: 100 SOLUTION INTRAVENOUS at 15:50:00

## 2022-12-01 NOTE — PROGRESS NOTES
Patient present for  [facility] for BUZZ. Patient c/o fatigue, denies any other complaints. 30 minute observation for post-injectafer infusion. Patient tolerated well. Discharged home with future appointments, ambulating independently with walker.

## 2022-12-05 ENCOUNTER — OFFICE VISIT (OUTPATIENT)
Dept: INTERNAL MEDICINE CLINIC | Facility: CLINIC | Age: 82
End: 2022-12-05
Payer: MEDICARE

## 2022-12-05 VITALS
BODY MASS INDEX: 27.22 KG/M2 | HEIGHT: 61 IN | DIASTOLIC BLOOD PRESSURE: 58 MMHG | HEART RATE: 78 BPM | SYSTOLIC BLOOD PRESSURE: 130 MMHG | WEIGHT: 144.19 LBS

## 2022-12-05 DIAGNOSIS — I10 PRIMARY HYPERTENSION: ICD-10-CM

## 2022-12-05 DIAGNOSIS — E11.9 TYPE 2 DIABETES MELLITUS WITHOUT RETINOPATHY (HCC): Primary | ICD-10-CM

## 2022-12-05 DIAGNOSIS — H00.019 HORDEOLUM EXTERNUM, UNSPECIFIED LATERALITY: ICD-10-CM

## 2022-12-05 DIAGNOSIS — R82.998 SWEET URINE ODOR: ICD-10-CM

## 2022-12-05 DIAGNOSIS — M54.42 ACUTE LEFT-SIDED BACK PAIN WITH SCIATICA: ICD-10-CM

## 2022-12-05 LAB
BILIRUBIN: NEGATIVE
GLUCOSE (URINE DIPSTICK): NEGATIVE MG/DL
KETONES (URINE DIPSTICK): NEGATIVE MG/DL
MULTISTIX LOT#: ABNORMAL NUMERIC
NITRITE, URINE: NEGATIVE
PH, URINE: 5.5 (ref 4.5–8)
PROTEIN (URINE DIPSTICK): 100 MG/DL
SPECIFIC GRAVITY: 1.02 (ref 1–1.03)
URINE-COLOR: YELLOW
UROBILINOGEN,SEMI-QN: 0.2 MG/DL (ref 0–1.9)

## 2022-12-05 PROCEDURE — 3075F SYST BP GE 130 - 139MM HG: CPT | Performed by: NURSE PRACTITIONER

## 2022-12-05 PROCEDURE — 3078F DIAST BP <80 MM HG: CPT | Performed by: NURSE PRACTITIONER

## 2022-12-05 PROCEDURE — 3008F BODY MASS INDEX DOCD: CPT | Performed by: NURSE PRACTITIONER

## 2022-12-05 PROCEDURE — 81003 URINALYSIS AUTO W/O SCOPE: CPT | Performed by: NURSE PRACTITIONER

## 2022-12-05 PROCEDURE — 99214 OFFICE O/P EST MOD 30 MIN: CPT | Performed by: NURSE PRACTITIONER

## 2022-12-05 PROCEDURE — 1125F AMNT PAIN NOTED PAIN PRSNT: CPT | Performed by: NURSE PRACTITIONER

## 2022-12-05 RX ORDER — TRAMADOL HYDROCHLORIDE 50 MG/1
50 TABLET ORAL EVERY 12 HOURS PRN
Qty: 20 TABLET | Refills: 0 | Status: SHIPPED | OUTPATIENT
Start: 2022-12-05

## 2022-12-05 RX ORDER — ERYTHROMYCIN 5 MG/G
1 OINTMENT OPHTHALMIC NIGHTLY
Qty: 1 G | Refills: 0 | Status: SHIPPED | OUTPATIENT
Start: 2022-12-05

## 2022-12-05 RX ORDER — NITROFURANTOIN 25; 75 MG/1; MG/1
100 CAPSULE ORAL 2 TIMES DAILY
Qty: 20 CAPSULE | Refills: 0 | Status: SHIPPED | OUTPATIENT
Start: 2022-12-05 | End: 2022-12-15

## 2022-12-06 LAB
BILIRUB UR QL: NEGATIVE
COLOR UR: YELLOW
GLUCOSE UR-MCNC: NEGATIVE MG/DL
HGB UR QL STRIP.AUTO: NEGATIVE
HYALINE CASTS #/AREA URNS AUTO: PRESENT /LPF
KETONES UR-MCNC: NEGATIVE MG/DL
NITRITE UR QL STRIP.AUTO: NEGATIVE
PH UR: 5 [PH] (ref 5–8)
PROT UR-MCNC: 100 MG/DL
SP GR UR STRIP: 1.01 (ref 1–1.03)
UROBILINOGEN UR STRIP-ACNC: <2
VIT C UR-MCNC: NEGATIVE MG/DL
WBC #/AREA URNS AUTO: >50 /HPF
WBC CLUMPS UR QL AUTO: PRESENT /HPF

## 2022-12-06 NOTE — PATIENT INSTRUCTIONS
ASSESSMENT/PLAN:   Type 2 diabetes mellitus without retinopathy (hcc)  (primary encounter diagnosis)  Careful with diet and excercise at least 30 minutes 3-4 times a week. Check sugars at different times on different dates. Careful with low sugars. Carry something with you and check sugar if can. Can carry hanh cracker, etc. Decrease carbohydrates. But also, careful with fruits and natural sugars. One serving a day and no more than 1 handful every day. Check feet  every AM and careful with sores and ulcers on feet bilaterally. Check eyes every year with dilated eye exam.  Check sugars. 2-hour postmeal should be less than 140s. Pre-meal should be 's. Both equally affected A1c. Discussed importance of glycemic control to prevent complications of diabetes  -Discussed complications of diabetes include retinopathy, neuropathy, nephropathy and cardiovascular disease  -Discussed ABCs of DM  -Discussed importance of SBGM  -Discussed importance of low CHO diet, recommend 45gm per meal or 135gm per day  -Normal foot exam      Primary hypertension  Careful with diet and excercise at least 30 minutes 3-4 times a week. Check blood pressures at different times on different days. Can purchase own blood pressure monitor. If not, check at local pharmacy. Bake foods more and grill occasionally. Avoid fried foods. No salt. Use other seasonings.      Sweet urine odor  Empirically starting on antibiotics  -encourage fluids 8-12 glasses of non-caffeinated fluids/day  -encourage drinking cranberry juice  -urinate after intercourse  -keep good hygiene, avoid harsh soaps and lotions to perineal area  -females-wipe front to back  -tylenol or ibuprofen for pain, fever  -call if increase in lower back pain or fever >100.5  -complete course of antibiotics as prescribed-not completing course of antibiotics may result in infection not fully eradicated or may lead to antibiotic resistance  -eat probiotic yogurt (Activia)  or take probiotic capsules i.e Align or Florastor (sprinkles are available for children)  -over the counter AZO will help relieve pain-1 tablet, three times a day for 2 days-you urine will turn ORANGE!  -take antibiotics on full stomach unless noted otherwise   -Call or return to office if symptoms are not markedly improved within 3-4 days or if symptoms are worsening    Acute left-sided back pain with sciatica  Take tramadol 1 tablet 2 times daily  Ordered xray      Stye  APply warm compress 3-4 times daily  Apply ointment as prescribed. Orders Placed This Encounter      URINALYSIS, AUTO, W/O SCOPE      Urinalysis, Routine      Urine Culture, Routine    Follow up as scheduled  Meds This Visit:  Requested Prescriptions     Pending Prescriptions Disp Refills    traMADol 50 MG Oral Tab 14 tablet 0     Sig: Take 1 tablet (50 mg total) by mouth every 12 (twelve) hours as needed for Pain. Signed Prescriptions Disp Refills    nitrofurantoin monohydrate macro (MACROBID) 100 MG Oral Cap 20 capsule 0     Sig: Take 1 capsule (100 mg total) by mouth 2 (two) times daily for 10 days.        Imaging & Referrals:  XR LUMBAR SPINE (MIN 2 VIEWS) (CPT=72100)

## 2022-12-13 DIAGNOSIS — Z51.81 MEDICATION MONITORING ENCOUNTER: ICD-10-CM

## 2022-12-13 DIAGNOSIS — M05.79 RHEUMATOID ARTHRITIS INVOLVING MULTIPLE SITES WITH POSITIVE RHEUMATOID FACTOR (HCC): ICD-10-CM

## 2022-12-13 RX ORDER — LEFLUNOMIDE 20 MG/1
20 TABLET ORAL DAILY
Qty: 90 TABLET | Refills: 0 | Status: SHIPPED | OUTPATIENT
Start: 2022-12-13

## 2022-12-13 NOTE — TELEPHONE ENCOUNTER
Rx request for Leflunomide 20 mg, please review and sign off if appropriate. Thank you.     Last seen: 11/14/22  Last refill: 8/8/22

## 2022-12-19 ENCOUNTER — NURSE TRIAGE (OUTPATIENT)
Dept: INTERNAL MEDICINE CLINIC | Facility: CLINIC | Age: 82
End: 2022-12-19

## 2022-12-19 DIAGNOSIS — R39.89 ABNORMAL URINE COLOR: Primary | ICD-10-CM

## 2022-12-20 ENCOUNTER — OFFICE VISIT (OUTPATIENT)
Dept: INTERNAL MEDICINE CLINIC | Facility: CLINIC | Age: 82
End: 2022-12-20
Payer: MEDICARE

## 2022-12-20 VITALS
DIASTOLIC BLOOD PRESSURE: 60 MMHG | WEIGHT: 137.19 LBS | OXYGEN SATURATION: 99 % | HEIGHT: 61 IN | HEART RATE: 77 BPM | BODY MASS INDEX: 25.9 KG/M2 | SYSTOLIC BLOOD PRESSURE: 138 MMHG

## 2022-12-20 DIAGNOSIS — N89.8 VAGINAL DISCHARGE: Primary | ICD-10-CM

## 2022-12-20 LAB
BILIRUB UR QL: NEGATIVE
BILIRUBIN: NEGATIVE
COLOR UR: YELLOW
GLUCOSE (URINE DIPSTICK): NEGATIVE MG/DL
GLUCOSE UR-MCNC: NEGATIVE MG/DL
HGB UR QL STRIP.AUTO: NEGATIVE
KETONES (URINE DIPSTICK): NEGATIVE MG/DL
KETONES UR-MCNC: NEGATIVE MG/DL
MULTISTIX LOT#: ABNORMAL NUMERIC
NITRITE UR QL STRIP.AUTO: NEGATIVE
NITRITE, URINE: NEGATIVE
OCCULT BLOOD: NEGATIVE
PH UR: 6.5 [PH] (ref 5–8)
PH, URINE: 6.5 (ref 4.5–8)
PROT UR-MCNC: NEGATIVE MG/DL
PROTEIN (URINE DIPSTICK): NEGATIVE MG/DL
SP GR UR STRIP: 1.01 (ref 1–1.03)
SPECIFIC GRAVITY: 1.01 (ref 1–1.03)
URINE-COLOR: YELLOW
UROBILINOGEN UR STRIP-ACNC: 0.2
UROBILINOGEN,SEMI-QN: 0.2 MG/DL (ref 0–1.9)

## 2022-12-20 PROCEDURE — 3008F BODY MASS INDEX DOCD: CPT | Performed by: NURSE PRACTITIONER

## 2022-12-20 PROCEDURE — 99214 OFFICE O/P EST MOD 30 MIN: CPT | Performed by: NURSE PRACTITIONER

## 2022-12-20 PROCEDURE — 3078F DIAST BP <80 MM HG: CPT | Performed by: NURSE PRACTITIONER

## 2022-12-20 PROCEDURE — 81002 URINALYSIS NONAUTO W/O SCOPE: CPT | Performed by: NURSE PRACTITIONER

## 2022-12-20 PROCEDURE — 3075F SYST BP GE 130 - 139MM HG: CPT | Performed by: NURSE PRACTITIONER

## 2022-12-20 NOTE — PATIENT INSTRUCTIONS
ASSESSMENT/PLAN:   Vaginal discharge  (primary encounter diagnosis)  Ordered vaginal culture  Pap done in office  Ordered Pap smear  Follow-up with gynecology Dr. Wolf  referral entered    Orders Placed This Encounter      ThinPrep PAP with HPV Reflex Request B      Genital vaginosis screen      Meds This Visit:  Requested Prescriptions      No prescriptions requested or ordered in this encounter       Imaging & Referrals:  CONSULTATION ONLY, OBG (INTERNAL)  US PELVIS W EV (CPT=76856/65885)

## 2022-12-22 LAB
GENITAL VAGINOSIS SCREEN: NEGATIVE
TRICHOMONAS SCREEN: NEGATIVE

## 2022-12-22 PROCEDURE — 88175 CYTOPATH C/V AUTO FLUID REDO: CPT | Performed by: NURSE PRACTITIONER

## 2023-01-04 RX ORDER — CARVEDILOL 25 MG/1
25 TABLET ORAL 2 TIMES DAILY WITH MEALS
Qty: 180 TABLET | Refills: 1 | Status: SHIPPED | OUTPATIENT
Start: 2023-01-04

## 2023-01-04 NOTE — TELEPHONE ENCOUNTER
Refill passed per [place] [facility] protocol.   Requested Prescriptions   Pending Prescriptions Disp Refills    CARVEDILOL 25 MG Oral Tab [Pharmacy Med Name: CARVEDILOL 25 MG TABLET] 180 tablet 1     Sig: TAKE 1 TABLET BY MOUTH TWICE A DAY WITH FOOD       Hypertensive Medications Protocol Passed - 1/4/2023 12:06 AM        Passed - In person appointment in the past 12 or next 3 months     Recent Outpatient Visits              2 weeks ago Vaginal discharge    [facility], [address], [address], [place], [address]    Office Visit    1 month ago Type 2 diabetes mellitus without retinopathy [facility])    [place] [facility], [address], Lazarojavier Rizvi, [facility], APRN    Office Visit    1 month ago Iron deficiency anemia due to chronic blood loss    [facility] Hudson Bruno MD    Office Visit    1 month ago Iron deficiency anemia due to chronic blood loss    [address] Visit    1 month ago Iron deficiency anemia due to chronic blood loss    [address] - Infusion    Nurse Only          Future Appointments         Provider Department Appt Notes    In 4 weeks EM Li Barry 1452 - Infusion SD-CBC-Iron&WBBV-Qpapizpu-Eyvlxbw labs-PORT-MYJ    In 4 weeks Li Laughlin 19 Hematology Oncology F/U    In 4 weeks EM CC INFRN [address] SD-INJECTAFER-PORT-MYJ    In 1 month Jacy Michel MD [place] [employer], [facility], [place], [place] [address]     In 2 months Ji Mcdonnell MD [place] [facility] [place], [facility], [facility]     In 2 months Gurjit Bronson, [address] [place] [address] 6 month fu                Passed - Last BP reading less than 140/90     BP Readings from Last 1 Encounters:  12/20/22 : 138/60              Passed - CMP or BMP in past 6 months     Recent Results (from the past 4392 hour(s))   COMP METABOLIC PANEL (14)    Collection Time: 10/04/22  9:38 AM Result Value Ref Range    Glucose 124 (H) 70 - 99 mg/dL    Sodium 136 136 - 145 mmol/L    Potassium 5.1 3.5 - 5.1 mmol/L    Chloride 110 98 - 112 mmol/L    CO2 17.0 (L) 21.0 - 32.0 mmol/L    Anion Gap 9 0 - 18 mmol/L    BUN 27 (H) 7 - 18 mg/dL    Creatinine 1.19 (H) 0.55 - 1.02 mg/dL    BUN/CREA Ratio 22.7 (H) 10.0 - 20.0    Calcium, Total 9.2 8.5 - 10.1 mg/dL    Calculated Osmolality 289 275 - 295 mOsm/kg    eGFR-Cr 45 (L) >=60 mL/min/1.73m2    ALT 24 13 - 56 U/L    AST 29 15 - 37 U/L    Alkaline Phosphatase 61 55 - 142 U/L    Bilirubin, Total 0.2 0.1 - 2.0 mg/dL    Total Protein 7.6 6.4 - 8.2 g/dL    Albumin 3.8 3.4 - 5.0 g/dL    Globulin  3.8 2.8 - 4.4 g/dL    A/G Ratio 1.0 1.0 - 2.0     *Note: Due to a large number of results and/or encounters for the requested time period, some results have not been displayed. A complete set of results can be found in Results Review.                Passed - In person appointment or virtual visit in the past 6 months     Recent Outpatient Visits              2 weeks ago Vaginal discharge    [employer], [address], [address], [place], BILL    Office Visit    1 month ago Type 2 diabetes mellitus without retinopathy [facility]    [facility], [address], [place], BILL    Office Visit    1 month ago Iron deficiency anemia due to chronic blood loss    [facility] Sánchez Valenzuela MD    Office Visit    1 month ago Iron deficiency anemia due to chronic blood loss    [address] Visit    1 month ago Iron deficiency anemia due to chronic blood loss    [facility]    Nurse Only          Future Appointments         Provider Department Appt Notes    In 4 weeks EM Li Barry 1452 - Infusion SD-CBC-Iron&IYNB-Dryxdwrt-Wnrrfgf labs-PORT-MYJ    In 4 weeks Li Yu 19 Hematology Oncology F/U    In 4 weeks EM CC INFRN 1 Sheri Anaya 200 [facility] Infusion SD-INJECTAFER-PORT-MYJ    In 1 month Carli Slice, [address] [place], [place] [address]     In 2 months Jose R Henderson MD [facility], [facility], [facility]     In 2 months Roxanne Brush, [address] 6 month fu                Passed - EGFRCR or GFRAA > 50     GFR Evaluation  EGFRCR: 57 , resulted on 10/26/2022

## 2023-01-06 ENCOUNTER — TELEPHONE (OUTPATIENT)
Dept: PULMONOLOGY | Facility: CLINIC | Age: 83
End: 2023-01-06

## 2023-01-07 DIAGNOSIS — Z51.81 MEDICATION MONITORING ENCOUNTER: ICD-10-CM

## 2023-01-07 DIAGNOSIS — M05.79 RHEUMATOID ARTHRITIS INVOLVING MULTIPLE SITES WITH POSITIVE RHEUMATOID FACTOR (HCC): ICD-10-CM

## 2023-01-07 RX ORDER — PREDNISONE 1 MG/1
5 TABLET ORAL DAILY
Qty: 90 TABLET | Refills: 0 | Status: SHIPPED | OUTPATIENT
Start: 2023-01-07

## 2023-01-07 NOTE — TELEPHONE ENCOUNTER
Medication Quantity Refills Start End   predniSONE 5 MG Oral Tab 90 tablet 0 10/11/2022      LOV: 11/14/22  Future Appointments   Date Time Provider Zehra Mendosai   2/2/2023 12:30 PM EM CC LAB1 EM CHEMO EMO   2/2/2023  1:30 PM Lizette Norton MD [address] [place] [address] HEM ONC EMO   2/2/2023  2:00 PM EM CC INFRN 1 [facility] CHEMO EMO   2/16/2023  1:40 PM Jesús Esquivel MD [facility]   3/10/2023  1:30 PM Taya Dwyer MD [facility]   3/23/2023  1:45 PM BILL [facility]     Instructions    You were seen for RA  Continue leflunomide 20 mg daily and prednisone 5 mg daily  Blood work in  Feb  See me in Feb

## 2023-01-25 ENCOUNTER — TELEPHONE (OUTPATIENT)
Dept: PULMONOLOGY | Facility: CLINIC | Age: 83
End: 2023-01-25

## 2023-01-25 DIAGNOSIS — R05.9 COUGH, UNSPECIFIED TYPE: Primary | ICD-10-CM

## 2023-01-25 NOTE — TELEPHONE ENCOUNTER
Patients order for CT expires on 1/26/23. Daughter is requesting a new order, patient now has a port.

## 2023-01-26 ENCOUNTER — HOSPITAL ENCOUNTER (OUTPATIENT)
Dept: ULTRASOUND IMAGING | Age: 83
Discharge: HOME | End: 2023-01-26
Attending: NURSE PRACTITIONER
Payer: MEDICARE

## 2023-01-26 DIAGNOSIS — N89.8 VAGINAL DISCHARGE: ICD-10-CM

## 2023-01-26 PROCEDURE — 76856 US EXAM PELVIC COMPLETE: CPT | Performed by: NURSE PRACTITIONER

## 2023-01-26 PROCEDURE — 76830 TRANSVAGINAL US NON-OB: CPT | Performed by: NURSE PRACTITIONER

## 2023-01-26 NOTE — TELEPHONE ENCOUNTER
Dr. Samira Mccray - Patient's CT Chest w/ contrast order . Patient now has a port, if agreeable please sign pended order.

## 2023-01-27 ENCOUNTER — PATIENT MESSAGE (OUTPATIENT)
Dept: RHEUMATOLOGY | Facility: CLINIC | Age: 83
End: 2023-01-27

## 2023-01-27 DIAGNOSIS — Z51.81 MEDICATION MONITORING ENCOUNTER: ICD-10-CM

## 2023-01-27 DIAGNOSIS — M05.79 RHEUMATOID ARTHRITIS INVOLVING MULTIPLE SITES WITH POSITIVE RHEUMATOID FACTOR (HCC): Primary | ICD-10-CM

## 2023-01-27 NOTE — TELEPHONE ENCOUNTER
From: Anoop Watkins  To: Renée Sanchez MD  Sent: 1/27/2023 2:00 PM CST  Subject: Lab Orders    Hi Dr. Jayy Roldan,  This is Mila Chamberlain; my mom Anoop Watkins (2/21/1940) has a follow-up visit with you on Feb. 16th. She has a Port now due to being an extremely hard stick. She is scheduled to have her lab work and infusion on the 3rd of Feb. I was wondering if there is a standing order in for the lab work, she needs for her visit with you on the 16th. or is it Feb. 3rd too early. Sincerely,  José Mccrary.  Thomas Petersen

## 2023-02-02 ENCOUNTER — NURSE ONLY (OUTPATIENT)
Dept: HEMATOLOGY/ONCOLOGY | Facility: HOSPITAL | Age: 83
End: 2023-02-02
Attending: INTERNAL MEDICINE
Payer: MEDICARE

## 2023-02-02 ENCOUNTER — HOSPITAL ENCOUNTER (OUTPATIENT)
Dept: ULTRASOUND IMAGING | Facility: HOSPITAL | Age: 83
Discharge: HOME | End: 2023-02-02
Attending: PHYSICIAN ASSISTANT
Payer: MEDICARE

## 2023-02-02 VITALS
RESPIRATION RATE: 16 BRPM | BODY MASS INDEX: 27.38 KG/M2 | HEART RATE: 97 BPM | SYSTOLIC BLOOD PRESSURE: 146 MMHG | WEIGHT: 145 LBS | DIASTOLIC BLOOD PRESSURE: 59 MMHG | HEIGHT: 61 IN | TEMPERATURE: 98 F | OXYGEN SATURATION: 97 %

## 2023-02-02 DIAGNOSIS — D50.0 IRON DEFICIENCY ANEMIA DUE TO CHRONIC BLOOD LOSS: ICD-10-CM

## 2023-02-02 DIAGNOSIS — Z95.828 PORT-A-CATH IN PLACE: ICD-10-CM

## 2023-02-02 DIAGNOSIS — K90.9 MALABSORPTION OF IRON: ICD-10-CM

## 2023-02-02 DIAGNOSIS — M79.89 RIGHT LEG SWELLING: ICD-10-CM

## 2023-02-02 DIAGNOSIS — M79.89 RIGHT LEG SWELLING: Primary | ICD-10-CM

## 2023-02-02 DIAGNOSIS — M05.79 RHEUMATOID ARTHRITIS INVOLVING MULTIPLE SITES WITH POSITIVE RHEUMATOID FACTOR (HCC): ICD-10-CM

## 2023-02-02 DIAGNOSIS — Z51.81 MEDICATION MONITORING ENCOUNTER: ICD-10-CM

## 2023-02-02 DIAGNOSIS — D50.0 IRON DEFICIENCY ANEMIA DUE TO CHRONIC BLOOD LOSS: Primary | ICD-10-CM

## 2023-02-02 LAB
ALBUMIN SERPL-MCNC: 2.9 G/DL (ref 3.4–5)
ALT SERPL-CCNC: 18 U/L
AST SERPL-CCNC: 21 U/L (ref 15–37)
BASOPHILS # BLD AUTO: 0.04 X10(3) UL (ref 0–0.2)
BASOPHILS NFR BLD AUTO: 0.5 %
CREAT BLD-MCNC: 1.11 MG/DL
CRP SERPL-MCNC: 2.37 MG/DL (ref ?–0.3)
DEPRECATED HBV CORE AB SER IA-ACNC: 342.5 NG/ML
DEPRECATED RDW RBC AUTO: 48 FL (ref 35.1–46.3)
EOSINOPHIL # BLD AUTO: 0.2 X10(3) UL (ref 0–0.7)
EOSINOPHIL NFR BLD AUTO: 2.5 %
ERYTHROCYTE [DISTWIDTH] IN BLOOD BY AUTOMATED COUNT: 13.5 % (ref 11–15)
ERYTHROCYTE [SEDIMENTATION RATE] IN BLOOD: 86 MM/HR
FOLATE SERPL-MCNC: 18.4 NG/ML (ref 8.7–?)
GFR SERPLBLD BASED ON 1.73 SQ M-ARVRAT: 49 ML/MIN/1.73M2 (ref 60–?)
HCT VFR BLD AUTO: 29.7 %
HGB BLD-MCNC: 9.4 G/DL
IMM GRANULOCYTES # BLD AUTO: 0.05 X10(3) UL (ref 0–1)
IMM GRANULOCYTES NFR BLD: 0.6 %
IRON SATN MFR SERPL: 19 %
IRON SERPL-MCNC: 54 UG/DL
LYMPHOCYTES # BLD AUTO: 1.61 X10(3) UL (ref 1–4)
LYMPHOCYTES NFR BLD AUTO: 20.1 %
MCH RBC QN AUTO: 31.3 PG (ref 26–34)
MCHC RBC AUTO-ENTMCNC: 31.6 G/DL (ref 31–37)
MCV RBC AUTO: 99 FL
MONOCYTES # BLD AUTO: 0.67 X10(3) UL (ref 0.1–1)
MONOCYTES NFR BLD AUTO: 8.4 %
NEUTROPHILS # BLD AUTO: 5.44 X10 (3) UL (ref 1.5–7.7)
NEUTROPHILS # BLD AUTO: 5.44 X10(3) UL (ref 1.5–7.7)
NEUTROPHILS NFR BLD AUTO: 67.9 %
PLATELET # BLD AUTO: 291 10(3)UL (ref 150–450)
RBC # BLD AUTO: 3 X10(6)UL
TIBC SERPL-MCNC: 291 UG/DL (ref 240–450)
TRANSFERRIN SERPL-MCNC: 195 MG/DL (ref 200–360)
VIT B12 SERPL-MCNC: 690 PG/ML (ref 193–986)
WBC # BLD AUTO: 8 X10(3) UL (ref 4–11)

## 2023-02-02 PROCEDURE — 84450 TRANSFERASE (AST) (SGOT): CPT

## 2023-02-02 PROCEDURE — 83540 ASSAY OF IRON: CPT

## 2023-02-02 PROCEDURE — 80048 BASIC METABOLIC PNL TOTAL CA: CPT | Performed by: NURSE PRACTITIONER

## 2023-02-02 PROCEDURE — 99214 OFFICE O/P EST MOD 30 MIN: CPT | Performed by: PHYSICIAN ASSISTANT

## 2023-02-02 PROCEDURE — 84466 ASSAY OF TRANSFERRIN: CPT

## 2023-02-02 PROCEDURE — 82728 ASSAY OF FERRITIN: CPT

## 2023-02-02 PROCEDURE — 82040 ASSAY OF SERUM ALBUMIN: CPT

## 2023-02-02 PROCEDURE — 85652 RBC SED RATE AUTOMATED: CPT

## 2023-02-02 PROCEDURE — 86140 C-REACTIVE PROTEIN: CPT

## 2023-02-02 PROCEDURE — 85025 COMPLETE CBC W/AUTO DIFF WBC: CPT

## 2023-02-02 PROCEDURE — 93971 EXTREMITY STUDY: CPT | Performed by: PHYSICIAN ASSISTANT

## 2023-02-02 PROCEDURE — 84460 ALANINE AMINO (ALT) (SGPT): CPT

## 2023-02-02 PROCEDURE — 96374 THER/PROPH/DIAG INJ IV PUSH: CPT

## 2023-02-02 PROCEDURE — 82565 ASSAY OF CREATININE: CPT

## 2023-02-02 PROCEDURE — 82746 ASSAY OF FOLIC ACID SERUM: CPT

## 2023-02-02 PROCEDURE — 82607 VITAMIN B-12: CPT

## 2023-02-02 RX ORDER — HEPARIN SODIUM (PORCINE) LOCK FLUSH IV SOLN 100 UNIT/ML 100 UNIT/ML
5 SOLUTION INTRAVENOUS ONCE
Status: CANCELLED | OUTPATIENT
Start: 2023-02-02

## 2023-02-02 RX ORDER — HEPARIN SODIUM (PORCINE) LOCK FLUSH IV SOLN 100 UNIT/ML 100 UNIT/ML
SOLUTION INTRAVENOUS
Status: DISCONTINUED
Start: 2023-02-02 | End: 2023-02-02

## 2023-02-02 RX ORDER — SODIUM CHLORIDE 9 MG/ML
10 INJECTION INTRAVENOUS ONCE
OUTPATIENT
Start: 2023-02-02

## 2023-02-02 RX ORDER — HEPARIN SODIUM (PORCINE) LOCK FLUSH IV SOLN 100 UNIT/ML 100 UNIT/ML
5 SOLUTION INTRAVENOUS ONCE
OUTPATIENT
Start: 2023-02-02

## 2023-02-02 RX ORDER — HEPARIN SODIUM (PORCINE) LOCK FLUSH IV SOLN 100 UNIT/ML 100 UNIT/ML
5 SOLUTION INTRAVENOUS ONCE
Status: DISCONTINUED | OUTPATIENT
Start: 2023-02-02 | End: 2023-02-02

## 2023-02-02 NOTE — PROGRESS NOTES
Patient arrives ambulating independently with daughter for INJECTAFER infusion. 30 minute observation completed with no s/s of adverse reaction. Patient seemed to tolerate well. Right PAC deaccessed, covered with 2x2 gauze and paper tape. Discharged home, stable with future appointments scheduled.

## 2023-02-14 RX ORDER — DULOXETIN HYDROCHLORIDE 20 MG/1
CAPSULE, DELAYED RELEASE ORAL
Qty: 180 CAPSULE | Refills: 0 | Status: SHIPPED | OUTPATIENT
Start: 2023-02-14

## 2023-02-27 ENCOUNTER — OFFICE VISIT (OUTPATIENT)
Dept: RHEUMATOLOGY | Facility: CLINIC | Age: 83
End: 2023-02-27

## 2023-02-27 VITALS
WEIGHT: 140 LBS | HEIGHT: 61 IN | DIASTOLIC BLOOD PRESSURE: 67 MMHG | HEART RATE: 97 BPM | BODY MASS INDEX: 26.43 KG/M2 | SYSTOLIC BLOOD PRESSURE: 115 MMHG

## 2023-02-27 DIAGNOSIS — M05.79 RHEUMATOID ARTHRITIS INVOLVING MULTIPLE SITES WITH POSITIVE RHEUMATOID FACTOR (HCC): ICD-10-CM

## 2023-02-27 DIAGNOSIS — Z51.81 MEDICATION MONITORING ENCOUNTER: ICD-10-CM

## 2023-02-27 DIAGNOSIS — D50.0 IRON DEFICIENCY ANEMIA DUE TO CHRONIC BLOOD LOSS: Primary | ICD-10-CM

## 2023-02-27 PROCEDURE — 3078F DIAST BP <80 MM HG: CPT | Performed by: INTERNAL MEDICINE

## 2023-02-27 PROCEDURE — 3008F BODY MASS INDEX DOCD: CPT | Performed by: INTERNAL MEDICINE

## 2023-02-27 PROCEDURE — 3074F SYST BP LT 130 MM HG: CPT | Performed by: INTERNAL MEDICINE

## 2023-02-27 PROCEDURE — 99214 OFFICE O/P EST MOD 30 MIN: CPT | Performed by: INTERNAL MEDICINE

## 2023-02-27 RX ORDER — PREDNISONE 1 MG/1
5 TABLET ORAL DAILY
Qty: 90 TABLET | Refills: 0 | Status: SHIPPED | OUTPATIENT
Start: 2023-02-27

## 2023-02-27 RX ORDER — HYDROXYCHLOROQUINE SULFATE 200 MG/1
200 TABLET, FILM COATED ORAL DAILY
Qty: 90 TABLET | Refills: 0 | Status: SHIPPED | OUTPATIENT
Start: 2023-02-27

## 2023-02-27 RX ORDER — PREDNISONE 10 MG/1
TABLET ORAL
Qty: 20 TABLET | Refills: 0 | Status: SHIPPED | OUTPATIENT
Start: 2023-02-27

## 2023-02-27 RX ORDER — LEFLUNOMIDE 20 MG/1
20 TABLET ORAL DAILY
Qty: 90 TABLET | Refills: 0 | Status: SHIPPED | OUTPATIENT
Start: 2023-02-27

## 2023-02-27 NOTE — PATIENT INSTRUCTIONS
You were seen today for rheumatoid arthritis  Continue leflunomide  Start Plaquenil 200 mg daily which is 1 pill a day  Take prednisone 3 pills for 3 days then 2 pills for 3 days then 1 pill for 3 days then go back to prednisone 5 mg daily  See me in 2 months. If you still have pain and swelling may have to consider starting you on a biologic called Cimzia    Please see the eye doctor in June or July to evaluate for any Plaquenil toxicity.   Please let him know you are on Plaquenil

## 2023-02-28 ENCOUNTER — PATIENT MESSAGE (OUTPATIENT)
Dept: RHEUMATOLOGY | Facility: CLINIC | Age: 83
End: 2023-02-28

## 2023-02-28 NOTE — TELEPHONE ENCOUNTER
From: Sylvie Asif  To: Jenelle Rollins MD  Sent: 2/28/2023 2:48 PM CST  Subject: Visit from 2/27    Cade Ag Patient,  This is Prudencio's daughter Marita Turcios [phone]. I was wondering if you could give me a call when you have a moment. I just found out yesterday after we returned home that my mom has not been taking the Leflunomide. You prescribed her Plaquenil yesterday to take with the Leflunomide, but since she decided to stop taking it and not letting me know I'm lost. I understand now why her inflammation numbers are high and why she is complaining of pain. I apologize for the inconvenience since we were in the office yesterday.  Thank you  Sincerely,  Marita Turcios

## 2023-03-01 NOTE — TELEPHONE ENCOUNTER
Called daughter and patient actually stopped the leflunomide not the cymbalta.  She will still try the plaquenil

## 2023-03-09 ENCOUNTER — HOSPITAL ENCOUNTER (OUTPATIENT)
Dept: CT IMAGING | Facility: HOSPITAL | Age: 83
Discharge: HOME | End: 2023-03-09
Attending: INTERNAL MEDICINE
Payer: MEDICARE

## 2023-03-09 DIAGNOSIS — R05.9 COUGH, UNSPECIFIED TYPE: ICD-10-CM

## 2023-03-09 LAB
CREAT BLD-MCNC: 0.4 MG/DL
GFR SERPLBLD BASED ON 1.73 SQ M-ARVRAT: 98 ML/MIN/1.73M2 (ref 60–?)

## 2023-03-09 PROCEDURE — 71260 CT THORAX DX C+: CPT | Performed by: INTERNAL MEDICINE

## 2023-03-09 PROCEDURE — 82565 ASSAY OF CREATININE: CPT

## 2023-03-09 RX ORDER — HEPARIN SODIUM (PORCINE) LOCK FLUSH IV SOLN 100 UNIT/ML 100 UNIT/ML
500 SOLUTION INTRAVENOUS ONCE
Status: COMPLETED | OUTPATIENT
Start: 2023-03-09 | End: 2023-03-09

## 2023-03-09 RX ORDER — HEPARIN SODIUM (PORCINE) LOCK FLUSH IV SOLN 100 UNIT/ML 100 UNIT/ML
SOLUTION INTRAVENOUS
Status: COMPLETED
Start: 2023-03-09 | End: 2023-03-09

## 2023-03-09 RX ADMIN — HEPARIN SODIUM (PORCINE) LOCK FLUSH IV SOLN 100 UNIT/ML 500 UNITS: 100 SOLUTION INTRAVENOUS at 13:50:00

## 2023-03-10 ENCOUNTER — TELEPHONE (OUTPATIENT)
Dept: INTERNAL MEDICINE CLINIC | Facility: CLINIC | Age: 83
End: 2023-03-10

## 2023-03-10 ENCOUNTER — OFFICE VISIT (OUTPATIENT)
Dept: INTERNAL MEDICINE CLINIC | Facility: CLINIC | Age: 83
End: 2023-03-10

## 2023-03-10 VITALS
WEIGHT: 150 LBS | RESPIRATION RATE: 16 BRPM | HEIGHT: 61 IN | HEART RATE: 81 BPM | SYSTOLIC BLOOD PRESSURE: 133 MMHG | TEMPERATURE: 97 F | BODY MASS INDEX: 28.32 KG/M2 | OXYGEN SATURATION: 99 % | DIASTOLIC BLOOD PRESSURE: 51 MMHG

## 2023-03-10 DIAGNOSIS — K31.819 AVM (ARTERIOVENOUS MALFORMATION) OF DUODENUM, ACQUIRED: ICD-10-CM

## 2023-03-10 DIAGNOSIS — Z12.11 COLON CANCER SCREENING: ICD-10-CM

## 2023-03-10 DIAGNOSIS — H40.003 GLAUCOMA SUSPECT OF BOTH EYES: ICD-10-CM

## 2023-03-10 DIAGNOSIS — R07.89 TIGHT CHEST: ICD-10-CM

## 2023-03-10 DIAGNOSIS — E83.52 HYPERCALCEMIA: ICD-10-CM

## 2023-03-10 DIAGNOSIS — Z71.85 VACCINE COUNSELING: ICD-10-CM

## 2023-03-10 DIAGNOSIS — K90.9 INTESTINAL MALABSORPTION, UNSPECIFIED TYPE: ICD-10-CM

## 2023-03-10 DIAGNOSIS — Z00.00 ENCOUNTER FOR ANNUAL HEALTH EXAMINATION: ICD-10-CM

## 2023-03-10 DIAGNOSIS — E11.9 TYPE 2 DIABETES MELLITUS WITHOUT RETINOPATHY (HCC): ICD-10-CM

## 2023-03-10 DIAGNOSIS — R19.00 PELVIC MASS: ICD-10-CM

## 2023-03-10 DIAGNOSIS — R19.7 DIARRHEA, UNSPECIFIED TYPE: ICD-10-CM

## 2023-03-10 DIAGNOSIS — Z96.1 PSEUDOPHAKIA OF BOTH EYES: ICD-10-CM

## 2023-03-10 DIAGNOSIS — K90.9 MALABSORPTION OF IRON: ICD-10-CM

## 2023-03-10 DIAGNOSIS — Z95.828 PORT-A-CATH IN PLACE: ICD-10-CM

## 2023-03-10 DIAGNOSIS — E78.5 DYSLIPIDEMIA: ICD-10-CM

## 2023-03-10 DIAGNOSIS — C50.911 INFILTRATING DUCTAL CARCINOMA OF RIGHT BREAST, STAGE 1 (HCC): ICD-10-CM

## 2023-03-10 DIAGNOSIS — E04.1 THYROID NODULE: ICD-10-CM

## 2023-03-10 DIAGNOSIS — D86.9 SARCOIDOSIS: ICD-10-CM

## 2023-03-10 DIAGNOSIS — H93.13 TINNITUS OF BOTH EARS: ICD-10-CM

## 2023-03-10 DIAGNOSIS — G62.9 NEUROPATHY: ICD-10-CM

## 2023-03-10 DIAGNOSIS — D50.9 IRON DEFICIENCY ANEMIA, UNSPECIFIED IRON DEFICIENCY ANEMIA TYPE: ICD-10-CM

## 2023-03-10 DIAGNOSIS — H10.503 BLEPHAROCONJUNCTIVITIS OF BOTH EYES, UNSPECIFIED BLEPHAROCONJUNCTIVITIS TYPE: ICD-10-CM

## 2023-03-10 DIAGNOSIS — R31.29 OTHER MICROSCOPIC HEMATURIA: ICD-10-CM

## 2023-03-10 DIAGNOSIS — H43.393 VITREOUS FLOATERS OF BOTH EYES: ICD-10-CM

## 2023-03-10 DIAGNOSIS — Z00.00 ADULT GENERAL MEDICAL EXAMINATION: Primary | ICD-10-CM

## 2023-03-10 DIAGNOSIS — N39.498 OTHER URINARY INCONTINENCE: ICD-10-CM

## 2023-03-10 DIAGNOSIS — D50.0 IRON DEFICIENCY ANEMIA DUE TO CHRONIC BLOOD LOSS: ICD-10-CM

## 2023-03-10 DIAGNOSIS — I10 PRIMARY HYPERTENSION: ICD-10-CM

## 2023-03-10 LAB
BILIRUBIN: NEGATIVE
CREAT UR-SCNC: 237 MG/DL
GLUCOSE (URINE DIPSTICK): NEGATIVE MG/DL
HYALINE CASTS #/AREA URNS AUTO: PRESENT /LPF
KETONES (URINE DIPSTICK): 15 MG/DL
MICROALBUMIN UR-MCNC: 28.6 MG/DL
MICROALBUMIN/CREAT 24H UR-RTO: 120.7 UG/MG (ref ?–30)
MULTISTIX LOT#: ABNORMAL NUMERIC
NITRITE, URINE: NEGATIVE
PH, URINE: 5.5 (ref 4.5–8)
PROTEIN (URINE DIPSTICK): 100 MG/DL
SPECIFIC GRAVITY: 1.02 (ref 1–1.03)
URINE-COLOR: YELLOW
UROBILINOGEN,SEMI-QN: 0.2 MG/DL (ref 0–1.9)

## 2023-03-10 PROCEDURE — 87186 SC STD MICRODIL/AGAR DIL: CPT | Performed by: INTERNAL MEDICINE

## 2023-03-10 PROCEDURE — 87086 URINE CULTURE/COLONY COUNT: CPT | Performed by: INTERNAL MEDICINE

## 2023-03-10 PROCEDURE — 82570 ASSAY OF URINE CREATININE: CPT | Performed by: INTERNAL MEDICINE

## 2023-03-10 PROCEDURE — 81015 MICROSCOPIC EXAM OF URINE: CPT | Performed by: INTERNAL MEDICINE

## 2023-03-10 PROCEDURE — 82043 UR ALBUMIN QUANTITATIVE: CPT | Performed by: INTERNAL MEDICINE

## 2023-03-10 PROCEDURE — 87077 CULTURE AEROBIC IDENTIFY: CPT | Performed by: INTERNAL MEDICINE

## 2023-03-10 RX ORDER — MONTELUKAST SODIUM 10 MG/1
10 TABLET ORAL NIGHTLY
Qty: 30 TABLET | Refills: 3 | Status: SHIPPED | OUTPATIENT
Start: 2023-03-10

## 2023-03-10 RX ORDER — CYCLOBENZAPRINE HCL 5 MG
5 TABLET ORAL EVERY EVENING
Qty: 90 TABLET | Refills: 1 | Status: SHIPPED | OUTPATIENT
Start: 2023-03-10

## 2023-03-10 NOTE — TELEPHONE ENCOUNTER
FYI  Patient's daughter states that you want her mom to f/u in 4 months but there is nothing available until September.

## 2023-03-11 ENCOUNTER — TELEPHONE (OUTPATIENT)
Dept: INTERNAL MEDICINE CLINIC | Facility: CLINIC | Age: 83
End: 2023-03-11

## 2023-03-11 NOTE — TELEPHONE ENCOUNTER
Needs to check whether surgical clips and breast will hinder her from doing MRI of abdomen. She also has clips in brain. Daughter has information whether okay to do MRI abdomen.

## 2023-03-12 DIAGNOSIS — R41.3 MEMORY LOSS: ICD-10-CM

## 2023-03-12 DIAGNOSIS — N30.00 ACUTE CYSTITIS WITHOUT HEMATURIA: Primary | ICD-10-CM

## 2023-03-12 RX ORDER — CIPROFLOXACIN 500 MG/1
500 TABLET, FILM COATED ORAL 2 TIMES DAILY
Qty: 10 TABLET | Refills: 0 | Status: SHIPPED | OUTPATIENT
Start: 2023-03-12 | End: 2023-03-17

## 2023-03-13 ENCOUNTER — TELEPHONE (OUTPATIENT)
Facility: CLINIC | Age: 83
End: 2023-03-13

## 2023-03-13 ENCOUNTER — TELEPHONE (OUTPATIENT)
Dept: PULMONOLOGY | Facility: CLINIC | Age: 83
End: 2023-03-13

## 2023-03-13 DIAGNOSIS — R07.89 TIGHT CHEST: ICD-10-CM

## 2023-03-13 DIAGNOSIS — R05.3 CHRONIC COUGH: ICD-10-CM

## 2023-03-13 DIAGNOSIS — D86.9 SARCOIDOSIS: Primary | ICD-10-CM

## 2023-03-13 NOTE — TELEPHONE ENCOUNTER
Patient has a follow-up visit with Dr. Anne Cartwright on 3/14/23. Please provide a referral. Thank you.

## 2023-03-13 NOTE — TELEPHONE ENCOUNTER
Spoke with Brianne from radiology she spoke with radiologist and was advised that patient can have MRI with surgical clips d/t titanium.

## 2023-03-14 ENCOUNTER — OFFICE VISIT (OUTPATIENT)
Dept: PULMONOLOGY | Facility: CLINIC | Age: 83
End: 2023-03-14

## 2023-03-14 VITALS
SYSTOLIC BLOOD PRESSURE: 162 MMHG | WEIGHT: 145 LBS | HEART RATE: 102 BPM | DIASTOLIC BLOOD PRESSURE: 79 MMHG | BODY MASS INDEX: 27.38 KG/M2 | OXYGEN SATURATION: 95 % | HEIGHT: 61 IN | RESPIRATION RATE: 16 BRPM

## 2023-03-14 DIAGNOSIS — R05.9 COUGH, UNSPECIFIED TYPE: Primary | ICD-10-CM

## 2023-03-14 DIAGNOSIS — R06.00 DYSPNEA, UNSPECIFIED TYPE: ICD-10-CM

## 2023-03-14 PROCEDURE — 3008F BODY MASS INDEX DOCD: CPT | Performed by: INTERNAL MEDICINE

## 2023-03-14 PROCEDURE — 1126F AMNT PAIN NOTED NONE PRSNT: CPT | Performed by: INTERNAL MEDICINE

## 2023-03-14 PROCEDURE — 99213 OFFICE O/P EST LOW 20 MIN: CPT | Performed by: INTERNAL MEDICINE

## 2023-03-14 PROCEDURE — 3078F DIAST BP <80 MM HG: CPT | Performed by: INTERNAL MEDICINE

## 2023-03-14 PROCEDURE — 3077F SYST BP >= 140 MM HG: CPT | Performed by: INTERNAL MEDICINE

## 2023-03-14 RX ORDER — ALBUTEROL SULFATE 90 UG/1
AEROSOL, METERED RESPIRATORY (INHALATION)
Qty: 1 EACH | Refills: 5 | Status: SHIPPED | OUTPATIENT
Start: 2023-03-14

## 2023-03-14 NOTE — PROGRESS NOTES
The patient is an 70-year-old female who I know well from prior evaluation and comes in now for follow-up. Her CT scan of the chest showed some postradiation scarring and trace effusions. There was no significant adenopathy. She had carried the diagnosis of sarcoidosis but there was never histologic diagnosis and there is not radiographic evidence either. She has mild dyspnea on exertion and has mild emphysema on the CT scan. Review of Systems:  Vision normal. Ear nose and throat normal. Bowel normal. Bladder function normal. No depression. No thyroid disease. No lymphatic system concerns. No rash. Muscles and joints unremarkable except for arthritis. No weight loss no weight gain. Physical Examination:  Vital signs normal. HEENT examination is unremarkable with pupils equal round and reactive to light and accommodation. Neck without adenopathy, thyromegaly, JVD nor bruit. Lungs clear to auscultation and percussion. Cardiac regular rate and rhythm no murmur. Abdomen nontender, without hepatosplenomegaly and no mass appreciable. Extremities and Musculoskeletal without clubbing cyanosis nor edema, and mobility acceptable. Neurologic grossly intact with symmetric tone and strength and reflex. Assessment and plan:  1. Dyspnea on exertion-patient has mild to moderate emphysematous changes on CT scanning and postradiation scarring. She also has trace effusions. I will give albuterol but also will check a proBNP to screen for cardiac contribution to her shortness of breath. Patient should plan on seeing me again at the 6-month interval or sooner if needed and contact me promptly if new problem. 2.  Questionable history of sarcoidosis-I doubt this diagnosis    3. Thyroid nodule-patient will get a thyroid ultrasound and the daughter thinks that primary care is following up.

## 2023-03-16 NOTE — TELEPHONE ENCOUNTER
Discussed radiologist rec w/ pt dtr. She recvd paperwork reg clip and will send to clinic or upload via [employer].

## 2023-03-20 ENCOUNTER — TELEPHONE (OUTPATIENT)
Dept: INTERNAL MEDICINE CLINIC | Facility: CLINIC | Age: 83
End: 2023-03-20

## 2023-03-20 DIAGNOSIS — E11.9 TYPE 2 DIABETES MELLITUS WITHOUT COMPLICATION, UNSPECIFIED WHETHER LONG TERM INSULIN USE (HCC): Primary | ICD-10-CM

## 2023-03-20 NOTE — TELEPHONE ENCOUNTER
Patient is requesting referral.     Name of specialist and specialty department : [facility], Endocrinology  Reason for visit with the specialist: diabetes follow up  Address of the specialist office:[address] [place] [address], [address] [place] [address]  Appointment date: 4/17/2023         CSS informed patient the turnaround time for referral is 5-7 business days. Patient was informed to check their [employer] account for referral status.

## 2023-03-21 ENCOUNTER — TELEPHONE (OUTPATIENT)
Facility: CLINIC | Age: 83
End: 2023-03-21

## 2023-03-21 ENCOUNTER — NURSE ONLY (OUTPATIENT)
Dept: HEMATOLOGY/ONCOLOGY | Facility: HOSPITAL | Age: 83
End: 2023-03-21
Attending: INTERNAL MEDICINE
Payer: MEDICARE

## 2023-03-21 DIAGNOSIS — Z00.00 ADULT GENERAL MEDICAL EXAMINATION: ICD-10-CM

## 2023-03-21 DIAGNOSIS — R06.00 DYSPNEA, UNSPECIFIED TYPE: Primary | ICD-10-CM

## 2023-03-21 DIAGNOSIS — K90.9 MALABSORPTION OF IRON: ICD-10-CM

## 2023-03-21 DIAGNOSIS — M79.89 RIGHT LEG SWELLING: ICD-10-CM

## 2023-03-21 DIAGNOSIS — Z95.828 PORT-A-CATH IN PLACE: ICD-10-CM

## 2023-03-21 DIAGNOSIS — D50.0 IRON DEFICIENCY ANEMIA DUE TO CHRONIC BLOOD LOSS: ICD-10-CM

## 2023-03-21 LAB
ALBUMIN SERPL-MCNC: 3.2 G/DL (ref 3.4–5)
ALBUMIN/GLOB SERPL: 0.8 {RATIO} (ref 1–2)
ALP LIVER SERPL-CCNC: 73 U/L
ALT SERPL-CCNC: 21 U/L
ANION GAP SERPL CALC-SCNC: 7 MMOL/L (ref 0–18)
AST SERPL-CCNC: 16 U/L (ref 15–37)
BILIRUB SERPL-MCNC: 0.2 MG/DL (ref 0.1–2)
BUN BLD-MCNC: 19 MG/DL (ref 7–18)
BUN/CREAT SERPL: 24.1 (ref 10–20)
CALCIUM BLD-MCNC: 9.7 MG/DL (ref 8.5–10.1)
CHLORIDE SERPL-SCNC: 108 MMOL/L (ref 98–112)
CHOLEST SERPL-MCNC: 191 MG/DL (ref ?–200)
CO2 SERPL-SCNC: 27 MMOL/L (ref 21–32)
CREAT BLD-MCNC: 0.79 MG/DL
EST. AVERAGE GLUCOSE BLD GHB EST-MCNC: 137 MG/DL (ref 68–126)
GFR SERPLBLD BASED ON 1.73 SQ M-ARVRAT: 74 ML/MIN/1.73M2 (ref 60–?)
GLOBULIN PLAS-MCNC: 3.8 G/DL (ref 2.8–4.4)
GLUCOSE BLD-MCNC: 155 MG/DL (ref 70–99)
HBA1C MFR BLD: 6.4 % (ref ?–5.7)
HDLC SERPL-MCNC: 62 MG/DL (ref 40–59)
LDLC SERPL CALC-MCNC: 99 MG/DL (ref ?–100)
NONHDLC SERPL-MCNC: 129 MG/DL (ref ?–130)
NT-PROBNP SERPL-MCNC: 173 PG/ML (ref ?–450)
OSMOLALITY SERPL CALC.SUM OF ELEC: 299 MOSM/KG (ref 275–295)
POTASSIUM SERPL-SCNC: 3.6 MMOL/L (ref 3.5–5.1)
PROT SERPL-MCNC: 7 G/DL (ref 6.4–8.2)
SODIUM SERPL-SCNC: 142 MMOL/L (ref 136–145)
TRIGL SERPL-MCNC: 177 MG/DL (ref 30–149)
TSI SER-ACNC: 0.55 MIU/ML (ref 0.36–3.74)
VLDLC SERPL CALC-MCNC: 29 MG/DL (ref 0–30)

## 2023-03-21 PROCEDURE — 84443 ASSAY THYROID STIM HORMONE: CPT | Performed by: INTERNAL MEDICINE

## 2023-03-21 PROCEDURE — 83036 HEMOGLOBIN GLYCOSYLATED A1C: CPT | Performed by: INTERNAL MEDICINE

## 2023-03-21 PROCEDURE — 80053 COMPREHEN METABOLIC PANEL: CPT | Performed by: INTERNAL MEDICINE

## 2023-03-21 PROCEDURE — 80061 LIPID PANEL: CPT | Performed by: INTERNAL MEDICINE

## 2023-03-21 PROCEDURE — 36591 DRAW BLOOD OFF VENOUS DEVICE: CPT

## 2023-03-21 PROCEDURE — 83880 ASSAY OF NATRIURETIC PEPTIDE: CPT

## 2023-03-21 RX ORDER — SODIUM CHLORIDE 9 MG/ML
10 INJECTION INTRAVENOUS ONCE
OUTPATIENT
Start: 2023-03-21

## 2023-03-21 RX ORDER — HEPARIN SODIUM (PORCINE) LOCK FLUSH IV SOLN 100 UNIT/ML 100 UNIT/ML
5 SOLUTION INTRAVENOUS ONCE
Status: COMPLETED | OUTPATIENT
Start: 2023-03-21 | End: 2023-03-21

## 2023-03-21 RX ORDER — HEPARIN SODIUM (PORCINE) LOCK FLUSH IV SOLN 100 UNIT/ML 100 UNIT/ML
5 SOLUTION INTRAVENOUS ONCE
OUTPATIENT
Start: 2023-03-21

## 2023-03-21 RX ADMIN — HEPARIN SODIUM (PORCINE) LOCK FLUSH IV SOLN 100 UNIT/ML 500 UNITS: 100 SOLUTION INTRAVENOUS at 10:19:00

## 2023-03-21 NOTE — TELEPHONE ENCOUNTER
Please sign off if you agree with plan of care.      Thank you,  [facility]  Referral specialist

## 2023-03-21 NOTE — TELEPHONE ENCOUNTER
Dot with Zehra Downing called stating patient has an appointment for lab draw today at 10 am. He has a central line, he wishes to have them drawn at [facility] center and needs labs to be changed to [facility] labs that indicate [address] from 3/10/23 and 3/12/23. I accessed encounters [on 3/10/23 and 3/12/23] when labs were ordered and changed location to [facility].

## 2023-03-27 ENCOUNTER — HOSPITAL ENCOUNTER (OUTPATIENT)
Dept: ULTRASOUND IMAGING | Age: 83
Discharge: HOME | End: 2023-03-27
Attending: INTERNAL MEDICINE
Payer: MEDICARE

## 2023-03-27 DIAGNOSIS — E04.1 THYROID NODULE: ICD-10-CM

## 2023-03-27 PROCEDURE — 76536 US EXAM OF HEAD AND NECK: CPT | Performed by: INTERNAL MEDICINE

## 2023-03-28 ENCOUNTER — OFFICE VISIT (OUTPATIENT)
Dept: OBGYN CLINIC | Facility: CLINIC | Age: 83
End: 2023-03-28

## 2023-03-28 VITALS — BODY MASS INDEX: 27.14 KG/M2 | HEIGHT: 61 IN | WEIGHT: 143.75 LBS

## 2023-03-28 DIAGNOSIS — R19.00 PELVIC MASS: Primary | ICD-10-CM

## 2023-03-28 PROCEDURE — 3008F BODY MASS INDEX DOCD: CPT | Performed by: OBSTETRICS & GYNECOLOGY

## 2023-03-28 PROCEDURE — 99204 OFFICE O/P NEW MOD 45 MIN: CPT | Performed by: OBSTETRICS & GYNECOLOGY

## 2023-03-30 ENCOUNTER — LAB ENCOUNTER (OUTPATIENT)
Dept: LAB | Facility: HOSPITAL | Age: 83
End: 2023-03-30
Attending: INTERNAL MEDICINE
Payer: MEDICARE

## 2023-03-30 DIAGNOSIS — R19.7 DIARRHEA, UNSPECIFIED TYPE: ICD-10-CM

## 2023-03-30 LAB
CRYPTOSP AG STL QL IA: NEGATIVE
G LAMBLIA AG STL QL IA: NEGATIVE

## 2023-03-30 PROCEDURE — 87186 SC STD MICRODIL/AGAR DIL: CPT | Performed by: INTERNAL MEDICINE

## 2023-03-30 PROCEDURE — 87077 CULTURE AEROBIC IDENTIFY: CPT | Performed by: INTERNAL MEDICINE

## 2023-03-30 PROCEDURE — 87329 GIARDIA AG IA: CPT

## 2023-03-30 PROCEDURE — 89055 LEUKOCYTE ASSESSMENT FECAL: CPT | Performed by: INTERNAL MEDICINE

## 2023-03-30 PROCEDURE — 87493 C DIFF AMPLIFIED PROBE: CPT

## 2023-03-30 PROCEDURE — 87272 CRYPTOSPORIDIUM AG IF: CPT

## 2023-03-30 PROCEDURE — 87086 URINE CULTURE/COLONY COUNT: CPT | Performed by: INTERNAL MEDICINE

## 2023-03-31 LAB — C DIFF TOX B STL QL: NEGATIVE

## 2023-04-06 ENCOUNTER — OFFICE VISIT (OUTPATIENT)
Dept: AUDIOLOGY | Facility: CLINIC | Age: 83
End: 2023-04-06

## 2023-04-06 ENCOUNTER — OFFICE VISIT (OUTPATIENT)
Dept: OTOLARYNGOLOGY | Facility: CLINIC | Age: 83
End: 2023-04-06

## 2023-04-06 VITALS — TEMPERATURE: 99 F

## 2023-04-06 DIAGNOSIS — H90.3 SENSORINEURAL HEARING LOSS, BILATERAL: Primary | ICD-10-CM

## 2023-04-06 DIAGNOSIS — J31.0 GUSTATORY RHINITIS: ICD-10-CM

## 2023-04-06 DIAGNOSIS — H93.13 TINNITUS OF BOTH EARS: Primary | ICD-10-CM

## 2023-04-06 PROCEDURE — 99203 OFFICE O/P NEW LOW 30 MIN: CPT | Performed by: OTOLARYNGOLOGY

## 2023-04-06 PROCEDURE — 92557 COMPREHENSIVE HEARING TEST: CPT | Performed by: AUDIOLOGIST

## 2023-04-06 PROCEDURE — 92567 TYMPANOMETRY: CPT | Performed by: AUDIOLOGIST

## 2023-04-06 RX ORDER — LORATADINE 10 MG/1
10 TABLET ORAL DAILY
Qty: 30 TABLET | Refills: 3 | Status: SHIPPED | OUTPATIENT
Start: 2023-04-06

## 2023-04-06 RX ORDER — AZELASTINE 1 MG/ML
2 SPRAY, METERED NASAL 2 TIMES DAILY
Qty: 30 ML | Refills: 0 | Status: SHIPPED | OUTPATIENT
Start: 2023-04-06

## 2023-04-17 ENCOUNTER — OFFICE VISIT (OUTPATIENT)
Dept: ENDOCRINOLOGY CLINIC | Facility: CLINIC | Age: 83
End: 2023-04-17

## 2023-04-17 VITALS
BODY MASS INDEX: 27 KG/M2 | DIASTOLIC BLOOD PRESSURE: 78 MMHG | SYSTOLIC BLOOD PRESSURE: 150 MMHG | HEART RATE: 94 BPM | WEIGHT: 143 LBS

## 2023-04-17 DIAGNOSIS — E11.42 TYPE 2 DIABETES MELLITUS WITH DIABETIC POLYNEUROPATHY, WITHOUT LONG-TERM CURRENT USE OF INSULIN (HCC): Primary | ICD-10-CM

## 2023-04-17 LAB
GLUCOSE BLOOD: 354
TEST STRIP LOT #: NORMAL NUMERIC

## 2023-04-17 PROCEDURE — 99214 OFFICE O/P EST MOD 30 MIN: CPT | Performed by: NURSE PRACTITIONER

## 2023-04-17 PROCEDURE — 82947 ASSAY GLUCOSE BLOOD QUANT: CPT | Performed by: NURSE PRACTITIONER

## 2023-04-17 PROCEDURE — 3077F SYST BP >= 140 MM HG: CPT | Performed by: NURSE PRACTITIONER

## 2023-04-17 PROCEDURE — 3078F DIAST BP <80 MM HG: CPT | Performed by: NURSE PRACTITIONER

## 2023-04-17 NOTE — PATIENT INSTRUCTIONS
A1C: 6.4% on 3/21/2023;  previously 6.0% on 9/1/2022  Blood glucose: 354 in clinic today    Medications:   - continue with glipizide 5mg once daily with breakfast     A1C goal:  <7.5%    Blood sugar testing:  Test your blood sugar 1 time daily   Recommended times to test: alternate with before breakfast (fasting) or 2hrs after meals     Blood sugar targets:  Before breakfast:  (preferably < 110)  2 hours after meals: <180 (preferably <150)     Call for persistent blood sugars < 75 or > 200

## 2023-04-18 ENCOUNTER — HOSPITAL ENCOUNTER (OUTPATIENT)
Dept: CV DIAGNOSTICS | Facility: HOSPITAL | Age: 83
Discharge: HOME | End: 2023-04-18
Attending: INTERNAL MEDICINE
Payer: MEDICARE

## 2023-04-18 ENCOUNTER — TELEPHONE (OUTPATIENT)
Dept: INTERNAL MEDICINE CLINIC | Facility: CLINIC | Age: 83
End: 2023-04-18

## 2023-04-18 DIAGNOSIS — R07.89 TIGHT CHEST: ICD-10-CM

## 2023-04-18 DIAGNOSIS — R07.89 TIGHT CHEST: Primary | ICD-10-CM

## 2023-04-18 NOTE — IMAGING NOTE
4144 Patient her for stress echo. Was sitting on a wheelchair. Patient states able to walk but is sore all over especially her knees due to arthritis. State she has arthritis \"all over. \" Patients states she gets tired easily because she's anemic. Was asked to walk in the hallway and walks with slow gait. [address] Dr. Luca Sierra office and spoke with Mili Cannon RN about the order. Test ordered was concerning due to pt's slow gait, c/o feeling sore and feeling tired easily. Explained that it's a safety issue for the pt. Mili Cannon RN stated will notify Dr. Harmeet Clarke and her office will call pt and give her instruction on what kind of stress test will be appropriate for her. [phone] with pt and pt.'s daughter that Dr. Luca carrillo will call pt  and give her instruction on stress test that will be appropriate for her - verbalized understanding. [address] accompanied by daughter and DANIEL KATE helped wheel pt to the main entrance.

## 2023-04-18 NOTE — TELEPHONE ENCOUNTER
Timothy Hutchins calling from Cardiology at [facility]   Patient arrived in wheelchair for Echo ,  Order placed was for for Stress Echo and patient will not be able to walk the tread mill     Timothy Hutchins suggests order to be changed to [place] [address] can you please re-order and then the patient can be re-scheduled for the echo     Order pended for your review and completion    Thank you

## 2023-04-20 ENCOUNTER — PATIENT MESSAGE (OUTPATIENT)
Dept: PULMONOLOGY | Facility: CLINIC | Age: 83
End: 2023-04-20

## 2023-04-20 ENCOUNTER — TELEPHONE (OUTPATIENT)
Dept: PULMONOLOGY | Facility: CLINIC | Age: 83
End: 2023-04-20

## 2023-04-20 RX ORDER — FLUTICASONE PROPIONATE AND SALMETEROL 250; 50 UG/1; UG/1
1 POWDER RESPIRATORY (INHALATION) EVERY 12 HOURS SCHEDULED
Qty: 1 EACH | Refills: 5 | Status: SHIPPED | OUTPATIENT
Start: 2023-04-20

## 2023-04-20 NOTE — TELEPHONE ENCOUNTER
From: Ernie Sosa  To: Alli Wheat MD  Sent: 4/20/2023 1:51 PM CDT  Subject: Briana Arguello am Chelsey Huang, Boris Renae daughter. She was in to see you on March 14th, she was prescribed a Albuterol inhaler for a consistent cough she has been having. It has helped slightly, but she continues to have a constant cough. We did refill the inhaler. Her diabetes doctor thought we should let you know about the continued cough, to see if her inhaler needs to be adjusted. She also is seeing an ENT doctor also. Sincerely,  Chelsey Huang for  [employer].  Thomas Tello [birth year]

## 2023-04-21 ENCOUNTER — HOSPITAL ENCOUNTER (OUTPATIENT)
Dept: BONE DENSITY | Age: 83
Discharge: HOME | End: 2023-04-21
Attending: INTERNAL MEDICINE
Payer: MEDICARE

## 2023-04-21 ENCOUNTER — HOSPITAL ENCOUNTER (OUTPATIENT)
Dept: MAMMOGRAPHY | Age: 83
Discharge: HOME | End: 2023-04-21
Attending: INTERNAL MEDICINE
Payer: MEDICARE

## 2023-04-21 DIAGNOSIS — C50.911 INFILTRATING DUCTAL CARCINOMA OF RIGHT BREAST, STAGE 1 (HCC): ICD-10-CM

## 2023-04-21 PROCEDURE — 77063 BREAST TOMOSYNTHESIS BI: CPT | Performed by: INTERNAL MEDICINE

## 2023-04-21 PROCEDURE — 77080 DXA BONE DENSITY AXIAL: CPT | Performed by: INTERNAL MEDICINE

## 2023-04-21 PROCEDURE — 77067 SCR MAMMO BI INCL CAD: CPT | Performed by: INTERNAL MEDICINE

## 2023-04-22 ENCOUNTER — TELEPHONE (OUTPATIENT)
Dept: RHEUMATOLOGY | Facility: CLINIC | Age: 83
End: 2023-04-22

## 2023-04-22 NOTE — TELEPHONE ENCOUNTER
Patients daughter is requesting a call from Dr. Harriett Dandy or a nurse. Patient is having a terrible flare up[ and is in a lot of pain. Patient is scheduled on 5-4 with Dr. Harriett Dandy for her yearly visit.     Daughter is wondering if a prescription of some kind can be phoned in to relieve pain

## 2023-04-24 RX ORDER — METHYLPREDNISOLONE 4 MG/1
TABLET ORAL
Qty: 1 EACH | Refills: 0 | Status: SHIPPED | OUTPATIENT
Start: 2023-04-24

## 2023-04-24 NOTE — TELEPHONE ENCOUNTER
Spoke with patients daughter. She stated she is having a bad flair up. Worse pain and stiffness in hands and elbows than usual. Worse in the morning and at night. Not sleeping as well due to the pain. Did not notice any redness or swelling. Would appreciate something to help until appointment with Dr. Carlton Olsen.      Future Appointments   Date Time Provider Zehra Mendosai   5/2/2023  1:00 PM EM CC LAB2 [facility] CHEMO EMO   5/2/2023  2:00 PM Isrrael Astorga MD [address] [place] [address] HEM ONC EMO   5/2/2023  3:00 PM EM CC INFRN 6 [facility] CHEMO EMO   5/4/2023  1:40 PM Barrett Montero MD [facility]   5/11/2023  2:00 PM Chance Pagan MD [address] [facility] OF THE [facility]   7/7/2023  2:30 PM Delicia Capone MD [facility]   7/13/2023  1:45 PM Levy Mayfield MD [address] [place] [address] OF [facility]   9/19/2023  1:45 PM Oksana Lacy MD [facility]   10/18/2023  1:45 PM [facility]

## 2023-04-24 NOTE — TELEPHONE ENCOUNTER
I spoke to patient's daughter. I will send in a Medrol Dosepak. This should take care of her flare. She is on 5 mg of prednisone daily baseline.

## 2023-05-02 ENCOUNTER — OFFICE VISIT (OUTPATIENT)
Dept: HEMATOLOGY/ONCOLOGY | Facility: HOSPITAL | Age: 83
End: 2023-05-02
Attending: INTERNAL MEDICINE
Payer: MEDICARE

## 2023-05-02 VITALS
BODY MASS INDEX: 27.19 KG/M2 | DIASTOLIC BLOOD PRESSURE: 60 MMHG | HEIGHT: 61 IN | SYSTOLIC BLOOD PRESSURE: 143 MMHG | WEIGHT: 144 LBS | OXYGEN SATURATION: 100 % | RESPIRATION RATE: 16 BRPM | TEMPERATURE: 98 F | HEART RATE: 80 BPM

## 2023-05-02 VITALS — SYSTOLIC BLOOD PRESSURE: 140 MMHG | RESPIRATION RATE: 16 BRPM | DIASTOLIC BLOOD PRESSURE: 48 MMHG | HEART RATE: 69 BPM

## 2023-05-02 DIAGNOSIS — D50.0 IRON DEFICIENCY ANEMIA DUE TO CHRONIC BLOOD LOSS: Primary | ICD-10-CM

## 2023-05-02 DIAGNOSIS — Z95.828 PORT-A-CATH IN PLACE: ICD-10-CM

## 2023-05-02 DIAGNOSIS — M05.79 RHEUMATOID ARTHRITIS INVOLVING MULTIPLE SITES WITH POSITIVE RHEUMATOID FACTOR (HCC): ICD-10-CM

## 2023-05-02 DIAGNOSIS — K90.9 MALABSORPTION OF IRON: ICD-10-CM

## 2023-05-02 DIAGNOSIS — M79.89 RIGHT LEG SWELLING: ICD-10-CM

## 2023-05-02 DIAGNOSIS — D50.0 IRON DEFICIENCY ANEMIA DUE TO CHRONIC BLOOD LOSS: ICD-10-CM

## 2023-05-02 DIAGNOSIS — Z51.81 MEDICATION MONITORING ENCOUNTER: ICD-10-CM

## 2023-05-02 DIAGNOSIS — K31.819 AVM (ARTERIOVENOUS MALFORMATION) OF DUODENUM, ACQUIRED: ICD-10-CM

## 2023-05-02 LAB
ALBUMIN SERPL-MCNC: 2.6 G/DL (ref 3.4–5)
ALT SERPL-CCNC: 19 U/L
AST SERPL-CCNC: 15 U/L (ref 15–37)
BASOPHILS # BLD AUTO: 0.03 X10(3) UL (ref 0–0.2)
BASOPHILS NFR BLD AUTO: 0.3 %
CREAT BLD-MCNC: 0.97 MG/DL
DEPRECATED HBV CORE AB SER IA-ACNC: 139.1 NG/ML
DEPRECATED RDW RBC AUTO: 52.2 FL (ref 35.1–46.3)
EOSINOPHIL # BLD AUTO: 0.17 X10(3) UL (ref 0–0.7)
EOSINOPHIL NFR BLD AUTO: 1.4 %
ERYTHROCYTE [DISTWIDTH] IN BLOOD BY AUTOMATED COUNT: 15 % (ref 11–15)
ERYTHROCYTE [SEDIMENTATION RATE] IN BLOOD: 64 MM/HR
FOLATE SERPL-MCNC: 18.2 NG/ML (ref 8.7–?)
GFR SERPLBLD BASED ON 1.73 SQ M-ARVRAT: 58 ML/MIN/1.73M2 (ref 60–?)
HCT VFR BLD AUTO: 27.9 %
HGB BLD-MCNC: 8.6 G/DL
IMM GRANULOCYTES # BLD AUTO: 0.13 X10(3) UL (ref 0–1)
IMM GRANULOCYTES NFR BLD: 1.1 %
IRON SATN MFR SERPL: 20 %
IRON SERPL-MCNC: 61 UG/DL
LYMPHOCYTES # BLD AUTO: 1.56 X10(3) UL (ref 1–4)
LYMPHOCYTES NFR BLD AUTO: 13.2 %
MCH RBC QN AUTO: 29.5 PG (ref 26–34)
MCHC RBC AUTO-ENTMCNC: 30.8 G/DL (ref 31–37)
MCV RBC AUTO: 95.5 FL
MONOCYTES # BLD AUTO: 0.82 X10(3) UL (ref 0.1–1)
MONOCYTES NFR BLD AUTO: 6.9 %
NEUTROPHILS # BLD AUTO: 9.09 X10 (3) UL (ref 1.5–7.7)
NEUTROPHILS # BLD AUTO: 9.09 X10(3) UL (ref 1.5–7.7)
NEUTROPHILS NFR BLD AUTO: 77.1 %
PLATELET # BLD AUTO: 327 10(3)UL (ref 150–450)
RBC # BLD AUTO: 2.92 X10(6)UL
TIBC SERPL-MCNC: 311 UG/DL (ref 240–450)
TRANSFERRIN SERPL-MCNC: 209 MG/DL (ref 200–360)
VIT B12 SERPL-MCNC: 893 PG/ML (ref 193–986)
WBC # BLD AUTO: 11.8 X10(3) UL (ref 4–11)

## 2023-05-02 PROCEDURE — 83540 ASSAY OF IRON: CPT

## 2023-05-02 PROCEDURE — 96374 THER/PROPH/DIAG INJ IV PUSH: CPT

## 2023-05-02 PROCEDURE — 84450 TRANSFERASE (AST) (SGOT): CPT

## 2023-05-02 PROCEDURE — 82040 ASSAY OF SERUM ALBUMIN: CPT

## 2023-05-02 PROCEDURE — 99214 OFFICE O/P EST MOD 30 MIN: CPT | Performed by: INTERNAL MEDICINE

## 2023-05-02 PROCEDURE — 84460 ALANINE AMINO (ALT) (SGPT): CPT

## 2023-05-02 PROCEDURE — 82746 ASSAY OF FOLIC ACID SERUM: CPT

## 2023-05-02 PROCEDURE — 85652 RBC SED RATE AUTOMATED: CPT

## 2023-05-02 PROCEDURE — 82607 VITAMIN B-12: CPT

## 2023-05-02 PROCEDURE — 82728 ASSAY OF FERRITIN: CPT

## 2023-05-02 PROCEDURE — 84466 ASSAY OF TRANSFERRIN: CPT

## 2023-05-02 PROCEDURE — 85025 COMPLETE CBC W/AUTO DIFF WBC: CPT

## 2023-05-02 PROCEDURE — 82565 ASSAY OF CREATININE: CPT

## 2023-05-02 RX ORDER — SODIUM CHLORIDE 9 MG/ML
10 INJECTION INTRAVENOUS ONCE
OUTPATIENT
Start: 2023-05-02

## 2023-05-02 RX ORDER — AZELASTINE HYDROCHLORIDE 137 UG/1
SPRAY, METERED NASAL
Qty: 1 EACH | Refills: 3 | Status: SHIPPED | OUTPATIENT
Start: 2023-05-02

## 2023-05-02 RX ORDER — HEPARIN SODIUM (PORCINE) LOCK FLUSH IV SOLN 100 UNIT/ML 100 UNIT/ML
5 SOLUTION INTRAVENOUS ONCE
OUTPATIENT
Start: 2023-05-02

## 2023-05-02 RX ORDER — HEPARIN SODIUM (PORCINE) LOCK FLUSH IV SOLN 100 UNIT/ML 100 UNIT/ML
SOLUTION INTRAVENOUS
Status: DISCONTINUED
Start: 2023-05-02 | End: 2023-05-02

## 2023-05-02 NOTE — PROGRESS NOTES
Patient presents for  [facility] for BUZZ. Patient c/o fatigue, denies any other complaints. 30 minute observation for post-injectafer infusion. Patient tolerated well. Discharged home with future appointments, ambulating independently with walker.

## 2023-05-04 ENCOUNTER — OFFICE VISIT (OUTPATIENT)
Dept: RHEUMATOLOGY | Facility: CLINIC | Age: 83
End: 2023-05-04

## 2023-05-04 ENCOUNTER — HOSPITAL ENCOUNTER (OUTPATIENT)
Dept: MRI IMAGING | Facility: HOSPITAL | Age: 83
Discharge: HOME | End: 2023-05-04
Attending: NURSE PRACTITIONER
Payer: MEDICARE

## 2023-05-04 VITALS
HEART RATE: 98 BPM | WEIGHT: 143.63 LBS | DIASTOLIC BLOOD PRESSURE: 59 MMHG | BODY MASS INDEX: 27.12 KG/M2 | HEIGHT: 61 IN | SYSTOLIC BLOOD PRESSURE: 139 MMHG

## 2023-05-04 DIAGNOSIS — M05.79 RHEUMATOID ARTHRITIS INVOLVING MULTIPLE SITES WITH POSITIVE RHEUMATOID FACTOR (HCC): Primary | ICD-10-CM

## 2023-05-04 DIAGNOSIS — Z51.81 MEDICATION MONITORING ENCOUNTER: ICD-10-CM

## 2023-05-04 PROBLEM — J44.9 CHRONIC OBSTRUCTIVE PULMONARY DISEASE, UNSPECIFIED (HCC): Status: ACTIVE | Noted: 2023-05-04

## 2023-05-04 PROCEDURE — 1159F MED LIST DOCD IN RCRD: CPT | Performed by: INTERNAL MEDICINE

## 2023-05-04 PROCEDURE — 1160F RVW MEDS BY RX/DR IN RCRD: CPT | Performed by: INTERNAL MEDICINE

## 2023-05-04 PROCEDURE — 99215 OFFICE O/P EST HI 40 MIN: CPT | Performed by: INTERNAL MEDICINE

## 2023-05-04 PROCEDURE — A9575 INJ GADOTERATE MEGLUMI 0.1ML: HCPCS | Performed by: NURSE PRACTITIONER

## 2023-05-04 PROCEDURE — 3008F BODY MASS INDEX DOCD: CPT | Performed by: INTERNAL MEDICINE

## 2023-05-04 PROCEDURE — 72197 MRI PELVIS W/O & W/DYE: CPT | Performed by: NURSE PRACTITIONER

## 2023-05-04 PROCEDURE — 3078F DIAST BP <80 MM HG: CPT | Performed by: INTERNAL MEDICINE

## 2023-05-04 PROCEDURE — 3075F SYST BP GE 130 - 139MM HG: CPT | Performed by: INTERNAL MEDICINE

## 2023-05-04 RX ORDER — METHYLPREDNISOLONE 4 MG/1
TABLET ORAL
Qty: 1 EACH | Refills: 0 | Status: SHIPPED | OUTPATIENT
Start: 2023-05-04

## 2023-05-04 RX ORDER — HYDROXYCHLOROQUINE SULFATE 200 MG/1
200 TABLET, FILM COATED ORAL DAILY
Qty: 90 TABLET | Refills: 0 | Status: SHIPPED | OUTPATIENT
Start: 2023-05-04

## 2023-05-04 RX ORDER — HEPARIN SODIUM (PORCINE) LOCK FLUSH IV SOLN 100 UNIT/ML 100 UNIT/ML
500 SOLUTION INTRAVENOUS ONCE
Status: COMPLETED | OUTPATIENT
Start: 2023-05-04 | End: 2023-05-04

## 2023-05-04 RX ORDER — GADOTERATE MEGLUMINE 376.9 MG/ML
15 INJECTION INTRAVENOUS
Status: COMPLETED | OUTPATIENT
Start: 2023-05-04 | End: 2023-05-04

## 2023-05-04 RX ORDER — HEPARIN SODIUM (PORCINE) LOCK FLUSH IV SOLN 100 UNIT/ML 100 UNIT/ML
SOLUTION INTRAVENOUS
Status: COMPLETED
Start: 2023-05-04 | End: 2023-05-04

## 2023-05-04 RX ADMIN — HEPARIN SODIUM (PORCINE) LOCK FLUSH IV SOLN 100 UNIT/ML 1000 UNITS: 100 SOLUTION INTRAVENOUS at 09:55:00

## 2023-05-04 RX ADMIN — GADOTERATE MEGLUMINE 13 ML: 376.9 INJECTION INTRAVENOUS at 09:46:00

## 2023-05-04 NOTE — PATIENT INSTRUCTIONS
Seen today for rheumatoid arthritis and continue to have flares  Continue Plaquenil 200 mg daily and prednisone 5 mg daily  I gave you another steroid pack in case you need it  We are going to try to get approval for Cimzia monthly injections which are in office injections, if not we will get them approved for home injections  Follow-up in the next 3 to 4 months

## 2023-05-07 ENCOUNTER — TELEPHONE (OUTPATIENT)
Dept: RHEUMATOLOGY | Facility: CLINIC | Age: 83
End: 2023-05-07

## 2023-05-07 NOTE — TELEPHONE ENCOUNTER
If we can get patient approved for Cimzia injections loading dose and maintenance dose for rheumatoid arthritis.  preferably in office

## 2023-05-09 NOTE — TELEPHONE ENCOUNTER
Submitted request for  cimzia injections for buy and bill with [employer]. Completed via phone with Christiana Galvin. Submitted notes via fax ([phone]).     Ref#: [id number]

## 2023-05-11 ENCOUNTER — OFFICE VISIT (OUTPATIENT)
Dept: OTOLARYNGOLOGY | Facility: CLINIC | Age: 83
End: 2023-05-11

## 2023-05-11 DIAGNOSIS — J31.0 GUSTATORY RHINITIS: Primary | ICD-10-CM

## 2023-05-11 PROCEDURE — 99213 OFFICE O/P EST LOW 20 MIN: CPT | Performed by: OTOLARYNGOLOGY

## 2023-05-11 PROCEDURE — 1159F MED LIST DOCD IN RCRD: CPT | Performed by: OTOLARYNGOLOGY

## 2023-05-11 PROCEDURE — 1160F RVW MEDS BY RX/DR IN RCRD: CPT | Performed by: OTOLARYNGOLOGY

## 2023-05-11 RX ORDER — GLYCOPYRROLATE 1 MG/1
1 TABLET ORAL 3 TIMES DAILY
Qty: 90 TABLET | Refills: 1 | Status: SHIPPED | OUTPATIENT
Start: 2023-05-11

## 2023-05-16 NOTE — TELEPHONE ENCOUNTER
[facility] from 5/9/23 to 12/31/2023 EOC [id number]. Pt my have to pay a deductible, copayment or coinsurance for these services. Please refer to your Evidence of Coverage to determine the benefits and limitations that may apply. [facility]'s Customer Care team can be contacted at [phone].

## 2023-05-17 ENCOUNTER — TELEPHONE (OUTPATIENT)
Dept: HEMATOLOGY/ONCOLOGY | Facility: HOSPITAL | Age: 83
End: 2023-05-17

## 2023-05-17 NOTE — TELEPHONE ENCOUNTER
Claudeen Shope [phone] calling about her mom Amrik Randall she has a port that she says is sore and moving when she touches it she has pain and it has never been like this before. Denies fever nusea vomiting. This has been going has been going on sense the MRI was done 5/4/23. But now she says this feels different.  Thanks [employer]

## 2023-05-17 NOTE — TELEPHONE ENCOUNTER
MRI 5/4 with port access for contrast    Patient complaining of port site bothering her and feels port is moving. No redness or swelling. When accessed for MRI there was some issues with accessing and getting blood return. When finally accessed there was only small amount of blood return. Patient very concerned. ACV scheduled for tomorrow at 1000 am for evaluation.

## 2023-05-18 ENCOUNTER — OFFICE VISIT (OUTPATIENT)
Dept: OBGYN CLINIC | Facility: CLINIC | Age: 83
End: 2023-05-18

## 2023-05-18 ENCOUNTER — OFFICE VISIT (OUTPATIENT)
Dept: HEMATOLOGY/ONCOLOGY | Facility: HOSPITAL | Age: 83
End: 2023-05-18
Attending: PHYSICIAN ASSISTANT
Payer: MEDICARE

## 2023-05-18 VITALS
HEART RATE: 86 BPM | SYSTOLIC BLOOD PRESSURE: 200 MMHG | RESPIRATION RATE: 16 BRPM | OXYGEN SATURATION: 96 % | HEIGHT: 61 IN | TEMPERATURE: 98 F | WEIGHT: 141 LBS | DIASTOLIC BLOOD PRESSURE: 76 MMHG | BODY MASS INDEX: 26.62 KG/M2

## 2023-05-18 DIAGNOSIS — R19.00 PELVIC MASS: Primary | ICD-10-CM

## 2023-05-18 DIAGNOSIS — Z95.828 PORT-A-CATH IN PLACE: Primary | ICD-10-CM

## 2023-05-18 PROCEDURE — 99214 OFFICE O/P EST MOD 30 MIN: CPT | Performed by: PHYSICIAN ASSISTANT

## 2023-05-18 PROCEDURE — 99213 OFFICE O/P EST LOW 20 MIN: CPT | Performed by: OBSTETRICS & GYNECOLOGY

## 2023-05-18 PROCEDURE — 1160F RVW MEDS BY RX/DR IN RCRD: CPT | Performed by: OBSTETRICS & GYNECOLOGY

## 2023-05-18 PROCEDURE — 1159F MED LIST DOCD IN RCRD: CPT | Performed by: OBSTETRICS & GYNECOLOGY

## 2023-05-19 ENCOUNTER — TELEPHONE (OUTPATIENT)
Dept: HEMATOLOGY/ONCOLOGY | Facility: HOSPITAL | Age: 83
End: 2023-05-19

## 2023-05-19 ENCOUNTER — TELEPHONE (OUTPATIENT)
Dept: OBGYN CLINIC | Facility: CLINIC | Age: 83
End: 2023-05-19

## 2023-05-19 ENCOUNTER — HOSPITAL ENCOUNTER (EMERGENCY)
Facility: HOSPITAL | Age: 83
Discharge: HOME | End: 2023-05-19
Attending: EMERGENCY MEDICINE
Payer: MEDICARE

## 2023-05-19 VITALS
TEMPERATURE: 98 F | BODY MASS INDEX: 26.62 KG/M2 | RESPIRATION RATE: 17 BRPM | OXYGEN SATURATION: 95 % | HEIGHT: 61 IN | DIASTOLIC BLOOD PRESSURE: 43 MMHG | HEART RATE: 80 BPM | WEIGHT: 141 LBS | SYSTOLIC BLOOD PRESSURE: 121 MMHG

## 2023-05-19 DIAGNOSIS — E11.65 TYPE 2 DIABETES MELLITUS WITH HYPERGLYCEMIA, WITHOUT LONG-TERM CURRENT USE OF INSULIN (HCC): ICD-10-CM

## 2023-05-19 DIAGNOSIS — K92.2 LOWER GI BLEEDING: Primary | ICD-10-CM

## 2023-05-19 DIAGNOSIS — R19.00 PELVIC MASS: Primary | ICD-10-CM

## 2023-05-19 DIAGNOSIS — R10.9 ABDOMINAL CRAMPS: ICD-10-CM

## 2023-05-19 LAB
ALBUMIN SERPL-MCNC: 3.3 G/DL (ref 3.4–5)
ALBUMIN/GLOB SERPL: 0.8 {RATIO} (ref 1–2)
ALP LIVER SERPL-CCNC: 81 U/L
ALT SERPL-CCNC: 18 U/L
ANION GAP SERPL CALC-SCNC: 7 MMOL/L (ref 0–18)
ANTIBODY SCREEN: NEGATIVE
AST SERPL-CCNC: 19 U/L (ref 15–37)
BASOPHILS # BLD AUTO: 0.05 X10(3) UL (ref 0–0.2)
BASOPHILS NFR BLD AUTO: 0.4 %
BILIRUB SERPL-MCNC: 0.2 MG/DL (ref 0.1–2)
BUN BLD-MCNC: 22 MG/DL (ref 7–18)
BUN/CREAT SERPL: 20 (ref 10–20)
CALCIUM BLD-MCNC: 9.9 MG/DL (ref 8.5–10.1)
CHLORIDE SERPL-SCNC: 104 MMOL/L (ref 98–112)
CO2 SERPL-SCNC: 27 MMOL/L (ref 21–32)
CREAT BLD-MCNC: 1.1 MG/DL
DEPRECATED RDW RBC AUTO: 53.1 FL (ref 35.1–46.3)
EOSINOPHIL # BLD AUTO: 0.17 X10(3) UL (ref 0–0.7)
EOSINOPHIL NFR BLD AUTO: 1.2 %
ERYTHROCYTE [DISTWIDTH] IN BLOOD BY AUTOMATED COUNT: 15.3 % (ref 11–15)
GFR SERPLBLD BASED ON 1.73 SQ M-ARVRAT: 50 ML/MIN/1.73M2 (ref 60–?)
GLOBULIN PLAS-MCNC: 4 G/DL (ref 2.8–4.4)
GLUCOSE BLD-MCNC: 305 MG/DL (ref 70–99)
HCT VFR BLD AUTO: 31.5 %
HGB BLD-MCNC: 10 G/DL
IMM GRANULOCYTES # BLD AUTO: 0.12 X10(3) UL (ref 0–1)
IMM GRANULOCYTES NFR BLD: 0.8 %
LYMPHOCYTES # BLD AUTO: 1.92 X10(3) UL (ref 1–4)
LYMPHOCYTES NFR BLD AUTO: 13.6 %
MCH RBC QN AUTO: 30.6 PG (ref 26–34)
MCHC RBC AUTO-ENTMCNC: 31.7 G/DL (ref 31–37)
MCV RBC AUTO: 96.3 FL
MONOCYTES # BLD AUTO: 1.04 X10(3) UL (ref 0.1–1)
MONOCYTES NFR BLD AUTO: 7.4 %
NEUTROPHILS # BLD AUTO: 10.84 X10 (3) UL (ref 1.5–7.7)
NEUTROPHILS # BLD AUTO: 10.84 X10(3) UL (ref 1.5–7.7)
NEUTROPHILS NFR BLD AUTO: 76.6 %
OSMOLALITY SERPL CALC.SUM OF ELEC: 301 MOSM/KG (ref 275–295)
PLATELET # BLD AUTO: 291 10(3)UL (ref 150–450)
POTASSIUM SERPL-SCNC: 4.1 MMOL/L (ref 3.5–5.1)
PROT SERPL-MCNC: 7.3 G/DL (ref 6.4–8.2)
RBC # BLD AUTO: 3.27 X10(6)UL
RH BLOOD TYPE: POSITIVE
SODIUM SERPL-SCNC: 138 MMOL/L (ref 136–145)
WBC # BLD AUTO: 14.1 X10(3) UL (ref 4–11)

## 2023-05-19 PROCEDURE — 86901 BLOOD TYPING SEROLOGIC RH(D): CPT | Performed by: EMERGENCY MEDICINE

## 2023-05-19 PROCEDURE — 86850 RBC ANTIBODY SCREEN: CPT | Performed by: EMERGENCY MEDICINE

## 2023-05-19 PROCEDURE — 86900 BLOOD TYPING SEROLOGIC ABO: CPT | Performed by: EMERGENCY MEDICINE

## 2023-05-19 PROCEDURE — 99284 EMERGENCY DEPT VISIT MOD MDM: CPT

## 2023-05-19 PROCEDURE — 36415 COLL VENOUS BLD VENIPUNCTURE: CPT

## 2023-05-19 PROCEDURE — 85025 COMPLETE CBC W/AUTO DIFF WBC: CPT | Performed by: EMERGENCY MEDICINE

## 2023-05-19 PROCEDURE — 99283 EMERGENCY DEPT VISIT LOW MDM: CPT

## 2023-05-19 PROCEDURE — 80053 COMPREHEN METABOLIC PANEL: CPT | Performed by: EMERGENCY MEDICINE

## 2023-05-19 RX ORDER — DICYCLOMINE HCL 20 MG
20 TABLET ORAL 4 TIMES DAILY PRN
Qty: 30 TABLET | Refills: 0 | Status: SHIPPED | OUTPATIENT
Start: 2023-05-19

## 2023-05-19 NOTE — ED INITIAL ASSESSMENT (HPI)
Pt ambulatory to ED A&O x 4 w/ c/o: Rectal bleeding. Pt reporting that she noticed a small amount last night and then this AM when she woke up, she had another BM w/ large bright red clot. Pt on baby ASA, not on any other blood thinners. Pt also c/o lower abdominal pain. (+) nausea.

## 2023-05-19 NOTE — TELEPHONE ENCOUNTER
I spoke with patients daughter and patient is currently in ER for other issues, and she said they were going to look at port and see what is going on. If still issue she will come in day before her doctor and iron appointment for labs and medicine in port if need be.

## 2023-05-19 NOTE — ED QUICK NOTES
Patient states having 1 bloody stool today. Patient states having intermittent dizziness x1 week. Patient states having shortness of breathx 1 month with activity.

## 2023-05-19 NOTE — TELEPHONE ENCOUNTER
Patient called back. Denies fevers, some sob but stable, no chest pain. Having nausea, vomited yesterday. Having stomach pain after eating, oral intake decreased. Had a brown stool with large blood clot. Some light headedness earlier. No urinary issues. Discussed to continue to monitor and go to Er if further bleeding, they prefer to present to ER now. Agreed with there plan to go to ER now if needed.

## 2023-05-19 NOTE — TELEPHONE ENCOUNTER
5/2/23 - Injectafer    Blood with stool/stomach pain/nausea    Returned call to dtr who is on her way back to the house. Patient and called her that she has blood in stool, color red, no clear of color of stool - brown? With spots. She did not have enough information and will call back once she is home.

## 2023-05-19 NOTE — TELEPHONE ENCOUNTER
Pt's daughter is calling concerned that the saw blood when she went to the bathroom. States that the pt had a stomachache last night and she now has blood in her feces.  Please advise-NL

## 2023-05-19 NOTE — TELEPHONE ENCOUNTER
----- Message from Danica Hewitt MD sent at 5/19/2023  8:36 AM CDT -----    SURGERY:  diagnostic laparoscopy   DATE REQUESTED:  Per patient   Anesthesia:  gen   ASSIST NEEDED:  yes   PRE-OP WITH PCP: yes     DX:  pelvic mass     Danica Hewitt MD

## 2023-05-19 NOTE — TELEPHONE ENCOUNTER
Per TA, call pt to schedule an appt with Dr. [facility]) to be evaluated for joint laparoscopy for pelvic mass or other recommendations he may have. Attempted to call pt.  No answer, LMTCB

## 2023-05-22 ENCOUNTER — TELEPHONE (OUTPATIENT)
Dept: HEMATOLOGY/ONCOLOGY | Facility: HOSPITAL | Age: 83
End: 2023-05-22

## 2023-05-22 NOTE — TELEPHONE ENCOUNTER
Spoke to her daughter, patient still sleeping(she is a late riser). No further bleeding noted. Still having the abdominal pain after eating that she has had for about a month. Still will vomit daily after eating some foods. Vomited yesterday after eating chinese food, but had good energy last evening. No fevers, no sob or chest pain. Encouraged to eat small frequent meals, push fluids. Monitor blood sugar it was noted to be 300 on 5/19. They will call for any issues.

## 2023-05-23 ENCOUNTER — TELEPHONE (OUTPATIENT)
Dept: INTERNAL MEDICINE CLINIC | Facility: CLINIC | Age: 83
End: 2023-05-23

## 2023-05-23 ENCOUNTER — PATIENT OUTREACH (OUTPATIENT)
Dept: CASE MANAGEMENT | Age: 83
End: 2023-05-23

## 2023-05-23 NOTE — TELEPHONE ENCOUNTER
Jhon  [phone]    Requesting an [facility] ER followup (from 5-19-23)  appt with Dr Bryce Zamora only within one week of 5-19-23. Call patient at with an appt.

## 2023-05-23 NOTE — PROGRESS NOTES
1st attempt ED hfu apt request    Beryle Parma  PCP  [address] [facility] [address]  [phone]  Office to call -availability  Confirmed w/ pt dtr  Closing encounter

## 2023-05-23 NOTE — TELEPHONE ENCOUNTER
Can do 3:30 PM May 31, 2023 for acute follow-up. If she needs to get in sooner she can see is one of the other providers. That would be strictly for follow-up. We are squeezing her in after hrs.

## 2023-05-25 NOTE — TELEPHONE ENCOUNTER
Called and spoke to pt's daughter, Beverley Mcnamara. OK to speak with per MANUEL. Beverley Mcnamara informed per TA request for pt to schedule with Dr. Nigel Morris. Beverley Mcnamara is asking about Radiology procedure for biopsy that TA discussed with pt. Wondering if they should see Dr. Nigel Morris prior to this. I informed Beverley Mcnamara that message is to be sent to TA to advise.

## 2023-05-31 ENCOUNTER — OFFICE VISIT (OUTPATIENT)
Dept: INTERNAL MEDICINE CLINIC | Facility: CLINIC | Age: 83
End: 2023-05-31

## 2023-05-31 ENCOUNTER — TELEPHONE (OUTPATIENT)
Dept: OBGYN CLINIC | Facility: CLINIC | Age: 83
End: 2023-05-31

## 2023-05-31 VITALS
RESPIRATION RATE: 17 BRPM | BODY MASS INDEX: 27.94 KG/M2 | TEMPERATURE: 97 F | WEIGHT: 148 LBS | OXYGEN SATURATION: 98 % | SYSTOLIC BLOOD PRESSURE: 150 MMHG | DIASTOLIC BLOOD PRESSURE: 70 MMHG | HEIGHT: 61 IN | HEART RATE: 81 BPM

## 2023-05-31 DIAGNOSIS — R19.00 PELVIC MASS: Primary | ICD-10-CM

## 2023-05-31 DIAGNOSIS — R05.3 CHRONIC COUGH: ICD-10-CM

## 2023-05-31 DIAGNOSIS — M53.82 CLICKING NECK: ICD-10-CM

## 2023-05-31 DIAGNOSIS — E78.5 DYSLIPIDEMIA: ICD-10-CM

## 2023-05-31 DIAGNOSIS — R22.1 PULSATILE NECK MASS: ICD-10-CM

## 2023-05-31 DIAGNOSIS — N94.89 ADNEXAL MASS: ICD-10-CM

## 2023-05-31 DIAGNOSIS — E11.9 TYPE 2 DIABETES MELLITUS WITHOUT RETINOPATHY (HCC): ICD-10-CM

## 2023-05-31 DIAGNOSIS — Z95.828 PORT-A-CATH IN PLACE: ICD-10-CM

## 2023-05-31 DIAGNOSIS — K62.5 RECTAL BLEEDING: ICD-10-CM

## 2023-05-31 DIAGNOSIS — D50.0 IRON DEFICIENCY ANEMIA DUE TO CHRONIC BLOOD LOSS: ICD-10-CM

## 2023-05-31 DIAGNOSIS — Z71.85 VACCINE COUNSELING: Primary | ICD-10-CM

## 2023-05-31 PROCEDURE — G0009 ADMIN PNEUMOCOCCAL VACCINE: HCPCS | Performed by: INTERNAL MEDICINE

## 2023-05-31 PROCEDURE — 90732 PPSV23 VACC 2 YRS+ SUBQ/IM: CPT | Performed by: INTERNAL MEDICINE

## 2023-05-31 PROCEDURE — 99215 OFFICE O/P EST HI 40 MIN: CPT | Performed by: INTERNAL MEDICINE

## 2023-05-31 RX ORDER — CYCLOBENZAPRINE HCL 10 MG
10 TABLET ORAL NIGHTLY
Qty: 90 TABLET | Refills: 1 | Status: SHIPPED | OUTPATIENT
Start: 2023-05-31

## 2023-05-31 NOTE — TELEPHONE ENCOUNTER
Referral entered for interventional radiology. Patient with [facility] health plan.  Routing to manage care for authorization

## 2023-06-01 NOTE — TELEPHONE ENCOUNTER
Shahbaz Benitez MD  You 4 days ago     Spoke with IR group will call radiology on Tuesday about other dx procedures.  Shelbi Guevara MD

## 2023-06-05 ENCOUNTER — TELEPHONE (OUTPATIENT)
Facility: CLINIC | Age: 83
End: 2023-06-05

## 2023-06-05 NOTE — TELEPHONE ENCOUNTER
Accepted 8am on Wednesday at [facility] given address. I just need someone to open it on Dr. Rosanna Yousif schedule.

## 2023-06-05 NOTE — TELEPHONE ENCOUNTER
Your Appointments    Wednesday June 07, 2023  8:00 AM  Follow Up Visit with Tamai Lowry MD  [address], [address], [place] ([place] [address]) [facility]  [phone]

## 2023-06-05 NOTE — TELEPHONE ENCOUNTER
Reviewed my schedule and I have 2 openings this week, either 3 pm tomorrow or at 8 am wed for office visit. RN to please inform pt.

## 2023-06-05 NOTE — TELEPHONE ENCOUNTER
Dr. Alpesh Martinez    I spoke to patient's daughter Lemuel Benavides (ok jeffery Benitez)  She told me that patient was in ER about 2 weeks ago after passing 2 blood clots. She has had no black tarry stools or any blood in the bowl, but she had some bright pink blood on the toilet tissue about 2-3 days ago. She does not have to strain when she goes and stools vary from [employer] turds\" to looser stools but never runny stools. She is on iron infusions. CBC in Epic- per daughter HGB dropped to 9 but went back to 10 after iron infusion. Per daughter PCP recommended she see you asap. She is scheduled for 1st available appointment in September, but concerned because so far out. I advised ER if any black stools, coffee ground emesis, large amounts of blood in bowl, or signs of anemia like shortness of breath.     Please advise    Thank you

## 2023-06-05 NOTE — TELEPHONE ENCOUNTER
Pt daughter scheduled first available 9/20/23 for rectal bleeding. She states that Dr Yana Anthony informed her previously that if rectal bleeding starts again, that she needed to be seen ASAP.   Please call

## 2023-06-06 RX ORDER — GLYCOPYRROLATE 1 MG/1
TABLET ORAL
Qty: 90 TABLET | Refills: 1 | Status: SHIPPED | OUTPATIENT
Start: 2023-06-06

## 2023-06-07 ENCOUNTER — OFFICE VISIT (OUTPATIENT)
Dept: GASTROENTEROLOGY | Facility: CLINIC | Age: 83
End: 2023-06-07

## 2023-06-07 ENCOUNTER — TELEPHONE (OUTPATIENT)
Dept: GASTROENTEROLOGY | Facility: CLINIC | Age: 83
End: 2023-06-07

## 2023-06-07 VITALS
BODY MASS INDEX: 27.83 KG/M2 | DIASTOLIC BLOOD PRESSURE: 64 MMHG | HEART RATE: 82 BPM | WEIGHT: 147.38 LBS | HEIGHT: 61 IN | SYSTOLIC BLOOD PRESSURE: 152 MMHG

## 2023-06-07 DIAGNOSIS — D50.9 IRON DEFICIENCY ANEMIA, UNSPECIFIED IRON DEFICIENCY ANEMIA TYPE: Primary | ICD-10-CM

## 2023-06-07 DIAGNOSIS — K62.5 RECTAL BLEEDING: Primary | ICD-10-CM

## 2023-06-07 DIAGNOSIS — D64.9 ANEMIA, UNSPECIFIED TYPE: ICD-10-CM

## 2023-06-07 PROCEDURE — 1159F MED LIST DOCD IN RCRD: CPT | Performed by: INTERNAL MEDICINE

## 2023-06-07 PROCEDURE — 3078F DIAST BP <80 MM HG: CPT | Performed by: INTERNAL MEDICINE

## 2023-06-07 PROCEDURE — 3008F BODY MASS INDEX DOCD: CPT | Performed by: INTERNAL MEDICINE

## 2023-06-07 PROCEDURE — 1160F RVW MEDS BY RX/DR IN RCRD: CPT | Performed by: INTERNAL MEDICINE

## 2023-06-07 PROCEDURE — 1126F AMNT PAIN NOTED NONE PRSNT: CPT | Performed by: INTERNAL MEDICINE

## 2023-06-07 PROCEDURE — 99214 OFFICE O/P EST MOD 30 MIN: CPT | Performed by: INTERNAL MEDICINE

## 2023-06-07 PROCEDURE — 3077F SYST BP >= 140 MM HG: CPT | Performed by: INTERNAL MEDICINE

## 2023-06-07 NOTE — PATIENT INSTRUCTIONS
Rectal bleeding  Chronic anemia  - history of AVMs - colonoscopy and EGD /Golytely prep and MAC   - blood tests after port fixed

## 2023-06-07 NOTE — TELEPHONE ENCOUNTER
Gemma Euceda Schedulers; Inform patient that we will be in contact with her once we have an opening at [facility] for her procedure and she verbalizes understanding and agreed to this plan. Instructions for Colyte were discussed with full details  and given to her while in the office .   As per  recommendation below:  Rectal bleeding  Chronic anemia  - history of AVMs - colonoscopy and EGD /Golytely prep and MAC   - blood tests after port fixed

## 2023-06-09 NOTE — TELEPHONE ENCOUNTER
Dr.Lynch Cade     Patient has been scheduled for 06/15/2023 at [facility] [place],       Patient daughter states patient takes diabetic meds please advise    thanks Karey Carr

## 2023-06-09 NOTE — TELEPHONE ENCOUNTER
carmina Rushing (Endo)  Scheduled for:  Colonoscopy [place] [id number]  Provider Name:     Date:  Thursday, 06/15/2023  Location:  [facility]  Sedation:  mac  Time:  8:00am (pt is aware to arrive at 7:00am    Prep:  golytely  Meds/Allergies Reconciled?:  Yes    Diagnosis with codes:  Rectal Bleeding K62.5, Chronic Anemia D64.9  Was patient informed to call insurance with codes (Y/N):  yes     Referral sent?:  Referral was sent at the time of electronic surgical scheduling. [facility] or [address] notified?:  I sent an electronic request to Endo Scheduling and received a confirmation today. Medication Orders:    Misc Orders:  none     Further instructions given by staff:  I discussed the prep instructions with the patient which she verbally understood and is aware that I will send the instructions today. via [employer]

## 2023-06-12 DIAGNOSIS — M05.79 RHEUMATOID ARTHRITIS INVOLVING MULTIPLE SITES WITH POSITIVE RHEUMATOID FACTOR (HCC): ICD-10-CM

## 2023-06-12 DIAGNOSIS — Z51.81 MEDICATION MONITORING ENCOUNTER: ICD-10-CM

## 2023-06-12 RX ORDER — PREDNISONE 5 MG/1
5 TABLET ORAL DAILY
Qty: 90 TABLET | Refills: 0 | Status: SHIPPED | OUTPATIENT
Start: 2023-06-12

## 2023-06-12 NOTE — TELEPHONE ENCOUNTER
Rx request for Prednisone 5mg, please review and sign off if appropriate. Thank you. Last seen: 5/4/23  Last refill:  2/27/23 #90 with 0 refills.

## 2023-06-12 NOTE — TELEPHONE ENCOUNTER
I relayed below instructions about diabetic medication with Mathieu Alfaro and she voiced understanding.

## 2023-06-13 ENCOUNTER — APPOINTMENT (OUTPATIENT)
Dept: HEMATOLOGY/ONCOLOGY | Facility: HOSPITAL | Age: 83
End: 2023-06-13
Attending: INTERNAL MEDICINE
Payer: MEDICARE

## 2023-06-13 ENCOUNTER — TELEPHONE (OUTPATIENT)
Dept: ENDOCRINOLOGY CLINIC | Facility: CLINIC | Age: 83
End: 2023-06-13

## 2023-06-13 ENCOUNTER — TELEPHONE (OUTPATIENT)
Facility: CLINIC | Age: 83
End: 2023-06-13

## 2023-06-13 VITALS — SYSTOLIC BLOOD PRESSURE: 160 MMHG | HEART RATE: 81 BPM | DIASTOLIC BLOOD PRESSURE: 61 MMHG | RESPIRATION RATE: 16 BRPM

## 2023-06-13 VITALS
DIASTOLIC BLOOD PRESSURE: 60 MMHG | WEIGHT: 144.38 LBS | RESPIRATION RATE: 16 BRPM | SYSTOLIC BLOOD PRESSURE: 162 MMHG | BODY MASS INDEX: 27.26 KG/M2 | HEART RATE: 79 BPM | TEMPERATURE: 98 F | HEIGHT: 61 IN | OXYGEN SATURATION: 93 %

## 2023-06-13 DIAGNOSIS — K62.5 RECTAL BLEEDING: Primary | ICD-10-CM

## 2023-06-13 DIAGNOSIS — D50.0 IRON DEFICIENCY ANEMIA DUE TO CHRONIC BLOOD LOSS: ICD-10-CM

## 2023-06-13 DIAGNOSIS — D50.0 IRON DEFICIENCY ANEMIA DUE TO CHRONIC BLOOD LOSS: Primary | ICD-10-CM

## 2023-06-13 DIAGNOSIS — K90.9 MALABSORPTION OF IRON: ICD-10-CM

## 2023-06-13 DIAGNOSIS — N94.89 ADNEXAL MASS: ICD-10-CM

## 2023-06-13 DIAGNOSIS — Z95.828 PORT-A-CATH IN PLACE: ICD-10-CM

## 2023-06-13 LAB
BASOPHILS # BLD AUTO: 0.04 X10(3) UL (ref 0–0.2)
BASOPHILS NFR BLD AUTO: 0.3 %
DEPRECATED HBV CORE AB SER IA-ACNC: 218.9 NG/ML
DEPRECATED RDW RBC AUTO: 60.7 FL (ref 35.1–46.3)
EOSINOPHIL # BLD AUTO: 0.09 X10(3) UL (ref 0–0.7)
EOSINOPHIL NFR BLD AUTO: 0.6 %
ERYTHROCYTE [DISTWIDTH] IN BLOOD BY AUTOMATED COUNT: 16.7 % (ref 11–15)
FOLATE SERPL-MCNC: 18.2 NG/ML (ref 8.7–?)
HCT VFR BLD AUTO: 29.3 %
HGB BLD-MCNC: 9.4 G/DL
IMM GRANULOCYTES # BLD AUTO: 0.16 X10(3) UL (ref 0–1)
IMM GRANULOCYTES NFR BLD: 1 %
IRON SATN MFR SERPL: 17 %
IRON SERPL-MCNC: 60 UG/DL
LYMPHOCYTES # BLD AUTO: 1.42 X10(3) UL (ref 1–4)
LYMPHOCYTES NFR BLD AUTO: 9.3 %
MCH RBC QN AUTO: 31.8 PG (ref 26–34)
MCHC RBC AUTO-ENTMCNC: 32.1 G/DL (ref 31–37)
MCV RBC AUTO: 99 FL
MONOCYTES # BLD AUTO: 1.15 X10(3) UL (ref 0.1–1)
MONOCYTES NFR BLD AUTO: 7.5 %
NEUTROPHILS # BLD AUTO: 12.43 X10 (3) UL (ref 1.5–7.7)
NEUTROPHILS # BLD AUTO: 12.43 X10(3) UL (ref 1.5–7.7)
NEUTROPHILS NFR BLD AUTO: 81.3 %
PLATELET # BLD AUTO: 358 10(3)UL (ref 150–450)
RBC # BLD AUTO: 2.96 X10(6)UL
TIBC SERPL-MCNC: 356 UG/DL (ref 240–450)
TRANSFERRIN SERPL-MCNC: 239 MG/DL (ref 200–360)
VIT B12 SERPL-MCNC: 802 PG/ML (ref 193–986)
WBC # BLD AUTO: 15.3 X10(3) UL (ref 4–11)

## 2023-06-13 PROCEDURE — 99214 OFFICE O/P EST MOD 30 MIN: CPT | Performed by: INTERNAL MEDICINE

## 2023-06-13 PROCEDURE — 83540 ASSAY OF IRON: CPT

## 2023-06-13 PROCEDURE — 36591 DRAW BLOOD OFF VENOUS DEVICE: CPT

## 2023-06-13 PROCEDURE — 84466 ASSAY OF TRANSFERRIN: CPT

## 2023-06-13 PROCEDURE — 82728 ASSAY OF FERRITIN: CPT

## 2023-06-13 PROCEDURE — 82607 VITAMIN B-12: CPT

## 2023-06-13 PROCEDURE — 85025 COMPLETE CBC W/AUTO DIFF WBC: CPT

## 2023-06-13 PROCEDURE — 80053 COMPREHEN METABOLIC PANEL: CPT | Performed by: INTERNAL MEDICINE

## 2023-06-13 PROCEDURE — 82746 ASSAY OF FOLIC ACID SERUM: CPT

## 2023-06-13 PROCEDURE — 96374 THER/PROPH/DIAG INJ IV PUSH: CPT

## 2023-06-13 RX ORDER — GLIPIZIDE 5 MG/1
5 TABLET ORAL
Qty: 180 TABLET | Refills: 0 | Status: SHIPPED | OUTPATIENT
Start: 2023-06-13

## 2023-06-13 RX ORDER — HEPARIN SODIUM (PORCINE) LOCK FLUSH IV SOLN 100 UNIT/ML 100 UNIT/ML
5 SOLUTION INTRAVENOUS ONCE
OUTPATIENT
Start: 2023-06-13

## 2023-06-13 RX ORDER — SODIUM CHLORIDE 9 MG/ML
10 INJECTION INTRAVENOUS ONCE
OUTPATIENT
Start: 2023-06-13

## 2023-06-13 RX ORDER — HEPARIN SODIUM (PORCINE) LOCK FLUSH IV SOLN 100 UNIT/ML 100 UNIT/ML
5 SOLUTION INTRAVENOUS ONCE
Status: COMPLETED | OUTPATIENT
Start: 2023-06-13 | End: 2023-06-13

## 2023-06-13 RX ADMIN — HEPARIN SODIUM (PORCINE) LOCK FLUSH IV SOLN 100 UNIT/ML 500 UNITS: 100 SOLUTION INTRAVENOUS at 13:24:00

## 2023-06-13 NOTE — TELEPHONE ENCOUNTER
----- Message from Og Linares RN sent at 6/13/2023  4:57 PM CDT -----  Graciela Campos- daughter(on hipaa) of Dr. Batool Schaefer note. Daughter verbalized understanding.

## 2023-06-13 NOTE — TELEPHONE ENCOUNTER
Daughter asking if patient can still have her iron infusion today since she is scheduled for a colonoscopy/EGD on Thursday. Please advise. Thank you.

## 2023-06-13 NOTE — PROGRESS NOTES
Pt arrived for injectafer infusion. Port accessed and [place] given as ordered. Observed for 30 minutes post infusion. Appeared to tolerate treatment, no s/s of rxn noted. Discharged home ambulating independently.   Lab Results   Component Value Date    HGB 9.4 (L) 06/13/2023    HCT 29.3 (L) 06/13/2023    ROSALIE 218.9 06/13/2023    SAT 17 06/13/2023

## 2023-06-13 NOTE — TELEPHONE ENCOUNTER
[id number] Anupama Heath noted. Her higher BG readings are likely due to new start of prednisone. Since with verbal report her BG readings are usually in 300s, I would recommend the following:     - increase glipizide to 5mg with breakfast and 5mg with dinner. She should give me an update on her BG readings again on Thursday 6/15 for review and further adjustment to medications as needed. She should check her BG readings twice daily -fasting and before dinner. Thank you!

## 2023-06-13 NOTE — TELEPHONE ENCOUNTER
Called patient's daughter Luis Fletcher and relayed below message as outlined. RN reiterated the importance of checking blood sugar as instructed and following up with the office to see if medication needs to be further adjusted as we don't want glucose to be staying in the 300s persistently. Daughter voiced understanding.

## 2023-06-15 ENCOUNTER — HOSPITAL ENCOUNTER (OUTPATIENT)
Facility: HOSPITAL | Age: 83
Setting detail: HOSPITAL OUTPATIENT SURGERY
Discharge: HOME | End: 2023-06-15
Attending: INTERNAL MEDICINE | Admitting: INTERNAL MEDICINE
Payer: MEDICARE

## 2023-06-15 ENCOUNTER — ANESTHESIA EVENT (OUTPATIENT)
Dept: ENDOSCOPY | Facility: HOSPITAL | Age: 83
End: 2023-06-15
Payer: MEDICARE

## 2023-06-15 ENCOUNTER — ANESTHESIA (OUTPATIENT)
Dept: ENDOSCOPY | Facility: HOSPITAL | Age: 83
End: 2023-06-15
Payer: MEDICARE

## 2023-06-15 ENCOUNTER — TELEPHONE (OUTPATIENT)
Dept: RHEUMATOLOGY | Facility: CLINIC | Age: 83
End: 2023-06-15

## 2023-06-15 VITALS
DIASTOLIC BLOOD PRESSURE: 64 MMHG | HEART RATE: 85 BPM | HEIGHT: 61 IN | OXYGEN SATURATION: 91 % | SYSTOLIC BLOOD PRESSURE: 136 MMHG | TEMPERATURE: 97 F | WEIGHT: 144 LBS | BODY MASS INDEX: 27.19 KG/M2 | RESPIRATION RATE: 17 BRPM

## 2023-06-15 DIAGNOSIS — D64.9 ANEMIA, UNSPECIFIED TYPE: ICD-10-CM

## 2023-06-15 DIAGNOSIS — K62.5 RECTAL BLEEDING: ICD-10-CM

## 2023-06-15 LAB — GLUCOSE BLDC GLUCOMTR-MCNC: 263 MG/DL (ref 70–99)

## 2023-06-15 PROCEDURE — 45388 COLONOSCOPY W/ABLATION: CPT | Performed by: INTERNAL MEDICINE

## 2023-06-15 PROCEDURE — 0D598ZZ DESTRUCTION OF DUODENUM, VIA NATURAL OR ARTIFICIAL OPENING ENDOSCOPIC: ICD-10-PCS | Performed by: INTERNAL MEDICINE

## 2023-06-15 PROCEDURE — 43270 EGD LESION ABLATION: CPT | Performed by: INTERNAL MEDICINE

## 2023-06-15 PROCEDURE — 0D5H8ZZ DESTRUCTION OF CECUM, VIA NATURAL OR ARTIFICIAL OPENING ENDOSCOPIC: ICD-10-PCS | Performed by: INTERNAL MEDICINE

## 2023-06-15 PROCEDURE — 0DBM8ZX EXCISION OF DESCENDING COLON, VIA NATURAL OR ARTIFICIAL OPENING ENDOSCOPIC, DIAGNOSTIC: ICD-10-PCS | Performed by: INTERNAL MEDICINE

## 2023-06-15 PROCEDURE — 0W3P8ZZ CONTROL BLEEDING IN GASTROINTESTINAL TRACT, VIA NATURAL OR ARTIFICIAL OPENING ENDOSCOPIC: ICD-10-PCS | Performed by: INTERNAL MEDICINE

## 2023-06-15 PROCEDURE — 45380 COLONOSCOPY AND BIOPSY: CPT | Performed by: INTERNAL MEDICINE

## 2023-06-15 RX ORDER — DEXTROSE MONOHYDRATE 25 G/50ML
50 INJECTION, SOLUTION INTRAVENOUS
OUTPATIENT
Start: 2023-06-15

## 2023-06-15 RX ORDER — NICOTINE POLACRILEX 4 MG
15 LOZENGE BUCCAL
OUTPATIENT
Start: 2023-06-15

## 2023-06-15 RX ORDER — GLYCOPYRROLATE 0.2 MG/ML
INJECTION, SOLUTION INTRAMUSCULAR; INTRAVENOUS AS NEEDED
Status: DISCONTINUED | OUTPATIENT
Start: 2023-06-15 | End: 2023-06-15 | Stop reason: SURG

## 2023-06-15 RX ORDER — SODIUM CHLORIDE, SODIUM LACTATE, POTASSIUM CHLORIDE, CALCIUM CHLORIDE 600; 310; 30; 20 MG/100ML; MG/100ML; MG/100ML; MG/100ML
INJECTION, SOLUTION INTRAVENOUS CONTINUOUS
Status: DISCONTINUED | OUTPATIENT
Start: 2023-06-15 | End: 2023-06-15

## 2023-06-15 RX ORDER — LIDOCAINE HYDROCHLORIDE 10 MG/ML
INJECTION, SOLUTION EPIDURAL; INFILTRATION; INTRACAUDAL; PERINEURAL AS NEEDED
Status: DISCONTINUED | OUTPATIENT
Start: 2023-06-15 | End: 2023-06-15 | Stop reason: SURG

## 2023-06-15 RX ORDER — HEPARIN SODIUM (PORCINE) LOCK FLUSH IV SOLN 100 UNIT/ML 100 UNIT/ML
500 SOLUTION INTRAVENOUS ONCE
Status: DISCONTINUED | OUTPATIENT
Start: 2023-06-15 | End: 2023-06-15

## 2023-06-15 RX ORDER — BLOOD SUGAR DIAGNOSTIC
STRIP MISCELLANEOUS
Qty: 200 STRIP | Refills: 1 | Status: SHIPPED | OUTPATIENT
Start: 2023-06-15

## 2023-06-15 RX ORDER — NICOTINE POLACRILEX 4 MG
30 LOZENGE BUCCAL
OUTPATIENT
Start: 2023-06-15

## 2023-06-15 RX ORDER — SODIUM CHLORIDE, SODIUM LACTATE, POTASSIUM CHLORIDE, CALCIUM CHLORIDE 600; 310; 30; 20 MG/100ML; MG/100ML; MG/100ML; MG/100ML
INJECTION, SOLUTION INTRAVENOUS CONTINUOUS
OUTPATIENT
Start: 2023-06-15

## 2023-06-15 RX ORDER — NALOXONE HYDROCHLORIDE 0.4 MG/ML
80 INJECTION, SOLUTION INTRAMUSCULAR; INTRAVENOUS; SUBCUTANEOUS AS NEEDED
OUTPATIENT
Start: 2023-06-15 | End: 2023-06-15

## 2023-06-15 RX ORDER — OMEPRAZOLE 20 MG/1
20 CAPSULE, DELAYED RELEASE ORAL EVERY MORNING
Qty: 30 CAPSULE | Refills: 0 | Status: SHIPPED | OUTPATIENT
Start: 2023-06-15

## 2023-06-15 RX ADMIN — SODIUM CHLORIDE, SODIUM LACTATE, POTASSIUM CHLORIDE, CALCIUM CHLORIDE: 600; 310; 30; 20 INJECTION, SOLUTION INTRAVENOUS at 08:09:00

## 2023-06-15 RX ADMIN — GLYCOPYRROLATE 0.2 MG: 0.2 INJECTION, SOLUTION INTRAMUSCULAR; INTRAVENOUS at 08:50:00

## 2023-06-15 RX ADMIN — LIDOCAINE HYDROCHLORIDE 25 MG: 10 INJECTION, SOLUTION EPIDURAL; INFILTRATION; INTRACAUDAL; PERINEURAL at 08:30:00

## 2023-06-15 NOTE — TELEPHONE ENCOUNTER
LOV: 4/17/23    Future Appointments   Date Time Provider Zehra Leon   6/29/2023  1:45 PM [facility]   10/18/2023  1:45 PM BILL Patiño [facility]     Refilled per protocol.

## 2023-06-15 NOTE — TELEPHONE ENCOUNTER
Patient's daughter called about injectable medication, she got a call about a week ago that shes returning

## 2023-06-15 NOTE — ANESTHESIA POSTPROCEDURE EVALUATION
Patient: Barak King    Procedure Summary     Date: 06/15/23 Room / Location: [address] [place] [address] ENDOSCOPY 04 / [address] [place] [address] ENDOSCOPY    Anesthesia Start: 0744 Anesthesia Stop:     Procedures:       ESOPHAGOGASTRODUODENOSCOPY      COLONOSCOPY Diagnosis:       Rectal bleeding      Anemia, unspecified type      (hemorrhoids, diverticulosis, AVM, colitis, hiatal hernia, Schatzki's ring )    Surgeons: Neftali Dempsey MD Anesthesiologist: Madelyn Fernandes CRNA    Anesthesia Type: MAC ASA Status: 3          Anesthesia Type: MAC    Vitals Value Taken Time   /60 06/15/23 0928   Temp  06/15/23 0928   Pulse 86 06/15/23 0928   Resp 16 06/15/23 0928   SpO2 98 06/15/23 0928       [facility] AN Post Evaluation:   Patient Evaluated in floor  Patient Participation: complete - patient participated  Level of Consciousness: sleepy but conscious  Pain Score: 0  Pain Management: adequate  Airway Patency:  Dental exam unchanged from preop  Yes    Respiratory Status: room air and acceptable  Comments: Report to MAGI Lawson CRNA  6/15/2023 9:28 AM

## 2023-06-15 NOTE — DISCHARGE INSTRUCTIONS
Home Care Instructions for Colonoscopy and/or Gastroscopy with Sedation    Diet:  - Resume your regular diet as tolerated unless otherwise instructed. - Start with light meals to minimize bloating.  - Do not drink alcohol today. Medication:  - If you have questions about resuming your normal medications, please contact your Primary Care Physician. Activities:  - Take it easy today. Do not return to work today. - Do not drive today. - Do not operate any machinery today (including kitchen equipment). Colonoscopy:  - You may notice some rectal \"spotting\" (a little blood on the toilet tissue) for a day or two after the exam. This is normal.  - If you experience any rectal bleeding (not spotting), persistent tenderness or sharp severe abdominal pains, oral temperature over 100 degrees Fahrenheit, light-headedness or dizziness, or any other problems, contact your doctor. Gastroscopy:  - You may have a sore throat for 2-3 days following the exam. This is normal. Gargling with warm salt water (1/2 tsp salt to 1 glass warm water) or using throat lozenges will help. - If you experience any sharp pain in your neck, abdomen or chest, vomiting of blood, oral temperature over 100 degrees Fahrenheit, light-headedness or dizziness, or any other problems, contact your doctor. **If unable to reach your doctor, please go to the [facility] Emergency Room**    - Your referring physician will receive a full report of your examination.  - If you do not hear from your doctor's office within two weeks of your biopsy, please call them for your results. You may be able to see your laboratory results in [facility] between 4 and 7 business days. In some cases, your physician may not have viewed the results before they are released to [address] If you have questions regarding your results contact the physician who ordered the test/exam by phone or via [address] by choosing \"Ask a Medical Question. \"

## 2023-06-17 RX ORDER — CARVEDILOL 25 MG/1
25 TABLET ORAL 2 TIMES DAILY WITH MEALS
Qty: 180 TABLET | Refills: 3 | Status: SHIPPED | OUTPATIENT
Start: 2023-06-17

## 2023-06-17 NOTE — TELEPHONE ENCOUNTER
Refill passed per [employer], [facility] protocol    Requested Prescriptions   Pending Prescriptions Disp Refills    CARVEDILOL 25 MG Oral Tab [Pharmacy Med Name: CARVEDILOL 25 MG TABLET] 180 tablet 1     Sig: TAKE 1 TABLET BY MOUTH TWICE A DAY WITH FOOD       Hypertensive Medications Protocol Passed - 6/16/2023  7:25 PM        Passed - In person appointment in the past 12 or next 3 months     Recent Outpatient Visits              4 days ago Rectal bleeding    [facility] Hematology Oncology Barbara Peña MD    Office Visit    4 days ago Iron deficiency anemia due to chronic blood loss    [address] [facility] - Infusion    Nurse Only    1 week ago Iron deficiency anemia, unspecified iron deficiency anemia type    [facility], [address], [place] Curt Conner MD    Office Visit    2 weeks ago Vaccine counseling    [facility], Arpit Candelario MD    Office Visit    1 month ago Port-A-Cath in place    [facility]ology Oncology [facility] [place], [place]    Office Visit          Future Appointments         Provider Department Appt Notes    In 3 days Tosin Mejias MD; [facility] RN RADIOLOGY 2; [facility]  [facility]    In 1 week [facility], [address], [facility]    In 1 week BILL Huerta [facility], [address] [facility], [place] Diabetes follow up    In 2 weeks MD Mercedez Morrissey, [address] Frankie ok per Dr Hassan Seip    In 3 weeks MD Mercedez Winters, [address], [facility] Pelvic mass    In 3 weeks EM [address] - Infusion [place]-(might need TPA)CBC-Iron&SVWO-Noajmcxc-D44-Folic acid-PORT-MYJ    In 3 weeks Germaine Gutierrez [facility] Hematology Oncology F/U    In 3 weeks EM CC INFRN [address] [place]-INJECTAFER-PORT-J    In 3 weeks Espinoza Mendoza MD [facility], [address], [place] Type 2 diabetes mellitus without retinopathy    In 1 month Carli Fontaine MD [address], [facility] 3 mo f/u    In 2 months Jose R Henderson MD [address], [address] Kari Fernandezann     In 3 months Jose E Kim MD [address], [address] [facility], [place] 6 mos    In 3 months Alyson Jefferson MD [facility] Rectal bleeding (Policy Informed)    In 4 months [address], [place] 6 mo fu               Passed - Last BP reading less than 140/90     BP Readings from Last 1 Encounters:  06/15/23 : 136/64              Passed - CMP or BMP in past 6 months     Recent Results (from the past 4392 hour(s))   COMP METABOLIC PANEL (14)    Collection Time: 06/13/23  1:21 PM   Result Value Ref Range    Glucose 367 (H) 70 - 99 mg/dL    Sodium 136 136 - 145 mmol/L    Potassium 4.2 3.5 - 5.1 mmol/L    Chloride 102 98 - 112 mmol/L    CO2 27.0 21.0 - 32.0 mmol/L    Anion Gap 7 0 - 18 mmol/L    BUN 27 (H) 7 - 18 mg/dL    Creatinine 1.03 (H) 0.55 - 1.02 mg/dL    BUN/CREA Ratio 26.2 (H) 10.0 - 20.0    Calcium, Total 10.4 (H) 8.5 - 10.1 mg/dL    Calculated Osmolality 302 (H) 275 - 295 mOsm/kg    eGFR-Cr 54 (L) >=60 mL/min/1.73m2    ALT 22 13 - 56 U/L    AST 19 15 - 37 U/L    Alkaline Phosphatase 77 55 - 142 U/L    Bilirubin, Total 0.2 0.1 - 2.0 mg/dL    Total Protein 7.1 6.4 - 8.2 g/dL    Albumin 3.1 (L) 3.4 - 5.0 g/dL    Globulin  4.0 2.8 - 4.4 g/dL    A/G Ratio 0.8 (L) 1.0 - 2.0    Patient Fasting for CMP? Patient not present      *Note: Due to a large number of results and/or encounters for the requested time period, some results have not been displayed. A complete set of results can be found in Results Review.                Passed - In person appointment or virtual visit in the past 6 months     Recent Outpatient Visits              4 days ago Rectal bleeding    [facility] Hematology Oncology Lizette Norton MD    Office Visit    4 days ago Iron deficiency anemia due to chronic blood loss    [address] - Infusion    Nurse Only    1 week ago Iron deficiency anemia, unspecified iron deficiency anemia type    [facility], [address], [place] Corry Campa MD    Office Visit    2 weeks ago Vaccine counseling    [address] [facility], Booker Chew MD    Office Visit    1 month ago Port-A-Cath in place    [facility] Hematology Oncology [place], [place]    Office Visit          Future Appointments         Provider Department Appt Notes    In 3 days Ton Monroe MD; [address] [place] [address] RN RADIOLOGY 2; [address] [facility] CT [facility] CT  [facility]    In 1 week [facility], [address], [facility]    In 1 week BILL [facility], [facility], [place] Diabetes follow up    In 2 weeks MD Gray Pryor, [address] [place] Daley ok per Dr Hannah Treadwell    In 3 weeks MD Gray Costello, [address], [facility] Pelvic mass    In 3 weeks EM [address] - Infusion [place]-(might need TPA)CBC-Iron&RYCX-Sgbjlitn-D63-Folic acid-PORT-MYJ    In 3 weeks [facility] Hematology Oncology F/U    In 3 weeks EM CC INFRN [address] - Infusion [place]-INJECTAFER-PORT-MYJ    In 3 weeks Zeynep Hidalgo MD [address] [place] [address] Type 2 diabetes mellitus without retinopathy    In 1 month MD Gray [address], [address] [place] [address] 3 mo f/u    In 2 months MD Gray Pryor, [address] [place] [address]     In 3 months [facility], [facility] 6 mos    In 3 months MD Kathy Hand [place] Rectal bleeding (Policy Informed)    In 4 months [address], [place] 6 mo fu               Passed - [facility] or GFRAA > 50     GFR Evaluation  EGFRCR: 54 , resulted on 6/13/2023

## 2023-06-20 ENCOUNTER — HOSPITAL ENCOUNTER (OUTPATIENT)
Dept: CT IMAGING | Facility: HOSPITAL | Age: 83
Discharge: HOME | End: 2023-06-20
Attending: OBSTETRICS & GYNECOLOGY
Payer: MEDICARE

## 2023-06-20 VITALS
SYSTOLIC BLOOD PRESSURE: 154 MMHG | DIASTOLIC BLOOD PRESSURE: 74 MMHG | BODY MASS INDEX: 27.19 KG/M2 | WEIGHT: 144 LBS | HEART RATE: 81 BPM | OXYGEN SATURATION: 100 % | RESPIRATION RATE: 16 BRPM | HEIGHT: 61 IN

## 2023-06-20 DIAGNOSIS — R19.00 PELVIC MASS: ICD-10-CM

## 2023-06-20 PROCEDURE — 88333 PATH CONSLTJ SURG CYTO XM 1: CPT | Performed by: OBSTETRICS & GYNECOLOGY

## 2023-06-20 PROCEDURE — 88305 TISSUE EXAM BY PATHOLOGIST: CPT | Performed by: OBSTETRICS & GYNECOLOGY

## 2023-06-20 PROCEDURE — 49180 BIOPSY ABDOMINAL MASS: CPT | Performed by: OBSTETRICS & GYNECOLOGY

## 2023-06-20 PROCEDURE — 77012 CT SCAN FOR NEEDLE BIOPSY: CPT | Performed by: OBSTETRICS & GYNECOLOGY

## 2023-06-20 PROCEDURE — 88341 IMHCHEM/IMCYTCHM EA ADD ANTB: CPT | Performed by: OBSTETRICS & GYNECOLOGY

## 2023-06-20 PROCEDURE — 88342 IMHCHEM/IMCYTCHM 1ST ANTB: CPT | Performed by: OBSTETRICS & GYNECOLOGY

## 2023-06-20 PROCEDURE — 99152 MOD SED SAME PHYS/QHP 5/>YRS: CPT | Performed by: OBSTETRICS & GYNECOLOGY

## 2023-06-20 RX ORDER — SODIUM CHLORIDE 9 MG/ML
INJECTION, SOLUTION INTRAVENOUS CONTINUOUS
Status: DISCONTINUED | OUTPATIENT
Start: 2023-06-20 | End: 2023-06-22

## 2023-06-20 RX ORDER — MIDAZOLAM HYDROCHLORIDE 1 MG/ML
INJECTION INTRAMUSCULAR; INTRAVENOUS
Status: COMPLETED
Start: 2023-06-20 | End: 2023-06-20

## 2023-06-20 RX ORDER — HEPARIN SODIUM (PORCINE) LOCK FLUSH IV SOLN 100 UNIT/ML 100 UNIT/ML
500 SOLUTION INTRAVENOUS ONCE
Status: COMPLETED | OUTPATIENT
Start: 2023-06-20 | End: 2023-06-20

## 2023-06-20 RX ORDER — FLUMAZENIL 0.1 MG/ML
0.2 INJECTION INTRAVENOUS AS NEEDED
Status: DISCONTINUED | OUTPATIENT
Start: 2023-06-20 | End: 2023-06-22

## 2023-06-20 RX ORDER — NALOXONE HYDROCHLORIDE 0.4 MG/ML
80 INJECTION, SOLUTION INTRAMUSCULAR; INTRAVENOUS; SUBCUTANEOUS AS NEEDED
Status: DISCONTINUED | OUTPATIENT
Start: 2023-06-20 | End: 2023-06-22

## 2023-06-20 RX ORDER — HEPARIN SODIUM (PORCINE) LOCK FLUSH IV SOLN 100 UNIT/ML 100 UNIT/ML
SOLUTION INTRAVENOUS
Status: COMPLETED
Start: 2023-06-20 | End: 2023-06-20

## 2023-06-20 RX ORDER — MIDAZOLAM HYDROCHLORIDE 1 MG/ML
1 INJECTION INTRAMUSCULAR; INTRAVENOUS EVERY 5 MIN PRN
Status: DISCONTINUED | OUTPATIENT
Start: 2023-06-20 | End: 2023-06-22

## 2023-06-20 RX ADMIN — MIDAZOLAM HYDROCHLORIDE 1 MG: 1 INJECTION INTRAMUSCULAR; INTRAVENOUS at 13:33:00

## 2023-06-20 RX ADMIN — MIDAZOLAM HYDROCHLORIDE 1 MG: 1 INJECTION INTRAMUSCULAR; INTRAVENOUS at 13:37:00

## 2023-06-20 RX ADMIN — HEPARIN SODIUM (PORCINE) LOCK FLUSH IV SOLN 100 UNIT/ML 500 UNITS: 100 SOLUTION INTRAVENOUS at 14:46:00

## 2023-06-20 RX ADMIN — SODIUM CHLORIDE: 9 INJECTION, SOLUTION INTRAVENOUS at 13:25:00

## 2023-06-20 NOTE — DISCHARGE INSTRUCTIONS
Procedure performed by Dr. Meredith Hilario. Biopsy/Aspiration of RETROPERITONEAL MASS    DISCHARGE INSTRUCTIONS                               DO NOT TAKE aspirin-containing products, Ibuprofen, Vitamin E, or                              blood thinning products for three (3) days after the procedure. You may                             take Tylenol (1 or 2 tablets every 4-6 hours) for mild discomfort at the                             biopsy site. Rest quietly for 24 hours, no physical activity. Avoid                              straining, heavy lifting, or strenuous physical activity for approximately                             2 days. Report any bleeding at aspiration/biopsy site, redness,                             swelling, odor, discharge, pain or fever that does not lessen after one                              day, to your physician. Resume a regular diet. Call your physician with                             questions or test results. Also you may contact the Radiology Nurse                             at [phone] with any additional questions or concerns. Other: YOU MAY REMOVE YOUR DRESSING IN 24 HOURS; WHILE THE DRESSING IS ON, YOU MAY SHOWER, BUT DO NOT SOAK IN WATER. BRING THIS SHEET WITH YOU SHOULD YOU HAVE TO VISIT AN EMERGENCY ROOM OR SEE YOUR DOCTOR IN THE NEXT 24 HOURS.

## 2023-06-21 ENCOUNTER — TELEPHONE (OUTPATIENT)
Dept: HEMATOLOGY/ONCOLOGY | Facility: HOSPITAL | Age: 83
End: 2023-06-21

## 2023-06-21 NOTE — TELEPHONE ENCOUNTER
Called daughter to reschedule patient appointments since she will now be seeing Dr. Smitha Rivas, asked her to please call back and adjust times or date.

## 2023-06-29 ENCOUNTER — OFFICE VISIT (OUTPATIENT)
Dept: ENDOCRINOLOGY CLINIC | Facility: CLINIC | Age: 83
End: 2023-06-29

## 2023-06-29 ENCOUNTER — TELEPHONE (OUTPATIENT)
Dept: OBGYN CLINIC | Facility: CLINIC | Age: 83
End: 2023-06-29

## 2023-06-29 VITALS
WEIGHT: 147 LBS | DIASTOLIC BLOOD PRESSURE: 75 MMHG | HEART RATE: 87 BPM | SYSTOLIC BLOOD PRESSURE: 149 MMHG | BODY MASS INDEX: 28 KG/M2

## 2023-06-29 DIAGNOSIS — E11.42 TYPE 2 DIABETES MELLITUS WITH DIABETIC POLYNEUROPATHY, WITHOUT LONG-TERM CURRENT USE OF INSULIN (HCC): Primary | ICD-10-CM

## 2023-06-29 LAB
CARTRIDGE LOT#: ABNORMAL NUMERIC
GLUCOSE BLOOD: 388
HEMOGLOBIN A1C: 8.1 % (ref 4.3–5.6)
TEST STRIP LOT #: NORMAL NUMERIC

## 2023-06-29 PROCEDURE — 99214 OFFICE O/P EST MOD 30 MIN: CPT | Performed by: NURSE PRACTITIONER

## 2023-06-29 PROCEDURE — 3078F DIAST BP <80 MM HG: CPT | Performed by: NURSE PRACTITIONER

## 2023-06-29 PROCEDURE — 3077F SYST BP >= 140 MM HG: CPT | Performed by: NURSE PRACTITIONER

## 2023-06-29 PROCEDURE — 1159F MED LIST DOCD IN RCRD: CPT | Performed by: NURSE PRACTITIONER

## 2023-06-29 PROCEDURE — 82947 ASSAY GLUCOSE BLOOD QUANT: CPT | Performed by: NURSE PRACTITIONER

## 2023-06-29 PROCEDURE — 1160F RVW MEDS BY RX/DR IN RCRD: CPT | Performed by: NURSE PRACTITIONER

## 2023-06-29 PROCEDURE — 83036 HEMOGLOBIN GLYCOSYLATED A1C: CPT | Performed by: NURSE PRACTITIONER

## 2023-07-02 RX ORDER — GLYCOPYRROLATE 1 MG/1
1 TABLET ORAL 3 TIMES DAILY
Qty: 90 TABLET | Refills: 1 | Status: SHIPPED | OUTPATIENT
Start: 2023-07-02

## 2023-07-06 RX ORDER — LORATADINE 10 MG/1
TABLET ORAL
Qty: 90 TABLET | Refills: 1 | Status: SHIPPED | OUTPATIENT
Start: 2023-07-06

## 2023-07-07 ENCOUNTER — OFFICE VISIT (OUTPATIENT)
Dept: INTERNAL MEDICINE CLINIC | Facility: CLINIC | Age: 83
End: 2023-07-07

## 2023-07-07 VITALS
BODY MASS INDEX: 27.79 KG/M2 | HEIGHT: 61 IN | SYSTOLIC BLOOD PRESSURE: 139 MMHG | TEMPERATURE: 98 F | DIASTOLIC BLOOD PRESSURE: 73 MMHG | RESPIRATION RATE: 18 BRPM | OXYGEN SATURATION: 100 % | HEART RATE: 88 BPM | WEIGHT: 147.19 LBS

## 2023-07-07 DIAGNOSIS — I10 PRIMARY HYPERTENSION: ICD-10-CM

## 2023-07-07 DIAGNOSIS — Z71.85 VACCINE COUNSELING: Primary | ICD-10-CM

## 2023-07-07 DIAGNOSIS — E11.9 TYPE 2 DIABETES MELLITUS WITHOUT RETINOPATHY (HCC): ICD-10-CM

## 2023-07-07 DIAGNOSIS — K13.79 MOUTH SORE: ICD-10-CM

## 2023-07-07 DIAGNOSIS — N89.8 VAGINAL MASS: ICD-10-CM

## 2023-07-07 PROCEDURE — 3008F BODY MASS INDEX DOCD: CPT | Performed by: INTERNAL MEDICINE

## 2023-07-07 PROCEDURE — 3078F DIAST BP <80 MM HG: CPT | Performed by: INTERNAL MEDICINE

## 2023-07-07 PROCEDURE — 1159F MED LIST DOCD IN RCRD: CPT | Performed by: INTERNAL MEDICINE

## 2023-07-07 PROCEDURE — 99215 OFFICE O/P EST HI 40 MIN: CPT | Performed by: INTERNAL MEDICINE

## 2023-07-07 PROCEDURE — 1160F RVW MEDS BY RX/DR IN RCRD: CPT | Performed by: INTERNAL MEDICINE

## 2023-07-07 PROCEDURE — 3075F SYST BP GE 130 - 139MM HG: CPT | Performed by: INTERNAL MEDICINE

## 2023-07-07 RX ORDER — LEFLUNOMIDE 20 MG/1
20 TABLET ORAL DAILY
COMMUNITY
Start: 2023-06-07

## 2023-07-07 RX ORDER — OMEPRAZOLE 20 MG/1
20 CAPSULE, DELAYED RELEASE ORAL EVERY MORNING
Qty: 30 CAPSULE | Refills: 0 | Status: SHIPPED | OUTPATIENT
Start: 2023-07-07

## 2023-07-07 NOTE — TELEPHONE ENCOUNTER
Requested Prescriptions     Pending Prescriptions Disp Refills    OMEPRAZOLE 20 MG Oral Capsule Delayed Release [Pharmacy Med Name: OMEPRAZOLE DR 20 MG CAPSULE] 30 capsule 0     Sig: TAKE 1 CAPSULE BY MOUTH EVERY DAY IN THE MORNING     LOV 6/7/2023    LR  6/15/2023      NC

## 2023-07-11 DIAGNOSIS — D50.0 IRON DEFICIENCY ANEMIA DUE TO CHRONIC BLOOD LOSS: Primary | ICD-10-CM

## 2023-07-11 DIAGNOSIS — K90.9 MALABSORPTION OF IRON: ICD-10-CM

## 2023-07-11 DIAGNOSIS — K62.5 RECTAL BLEEDING: ICD-10-CM

## 2023-07-11 DIAGNOSIS — Z45.2 ENCOUNTER FOR CARE RELATED TO VASCULAR ACCESS PORT: Primary | ICD-10-CM

## 2023-07-11 RX ORDER — HEPARIN SODIUM (PORCINE) LOCK FLUSH IV SOLN 100 UNIT/ML 100 UNIT/ML
5 SOLUTION INTRAVENOUS ONCE
Status: CANCELLED | OUTPATIENT
Start: 2023-07-11

## 2023-07-11 RX ORDER — SODIUM CHLORIDE 9 MG/ML
10 INJECTION INTRAVENOUS ONCE
Status: CANCELLED | OUTPATIENT
Start: 2023-07-11

## 2023-07-12 ENCOUNTER — NURSE ONLY (OUTPATIENT)
Dept: HEMATOLOGY/ONCOLOGY | Facility: HOSPITAL | Age: 83
End: 2023-07-12
Attending: INTERNAL MEDICINE
Payer: MEDICARE

## 2023-07-12 ENCOUNTER — PATIENT MESSAGE (OUTPATIENT)
Dept: ADMINISTRATIVE | Age: 83
End: 2023-07-12

## 2023-07-12 VITALS
RESPIRATION RATE: 16 BRPM | BODY MASS INDEX: 27.34 KG/M2 | HEIGHT: 61 IN | TEMPERATURE: 98 F | SYSTOLIC BLOOD PRESSURE: 175 MMHG | HEART RATE: 90 BPM | DIASTOLIC BLOOD PRESSURE: 74 MMHG | WEIGHT: 144.81 LBS | OXYGEN SATURATION: 100 %

## 2023-07-12 DIAGNOSIS — N94.89 ADNEXAL MASS: ICD-10-CM

## 2023-07-12 DIAGNOSIS — D50.0 IRON DEFICIENCY ANEMIA DUE TO CHRONIC BLOOD LOSS: Primary | ICD-10-CM

## 2023-07-12 DIAGNOSIS — D50.0 IRON DEFICIENCY ANEMIA DUE TO CHRONIC BLOOD LOSS: ICD-10-CM

## 2023-07-12 DIAGNOSIS — Z45.2 ENCOUNTER FOR CARE RELATED TO VASCULAR ACCESS PORT: Primary | ICD-10-CM

## 2023-07-12 DIAGNOSIS — K31.819 AVM (ARTERIOVENOUS MALFORMATION) OF DUODENUM, ACQUIRED: ICD-10-CM

## 2023-07-12 DIAGNOSIS — K90.9 MALABSORPTION OF IRON: ICD-10-CM

## 2023-07-12 LAB
BASOPHILS # BLD AUTO: 0.04 X10(3) UL (ref 0–0.2)
BASOPHILS NFR BLD AUTO: 0.4 %
DEPRECATED HBV CORE AB SER IA-ACNC: 646.8 NG/ML
DEPRECATED RDW RBC AUTO: 51.8 FL (ref 35.1–46.3)
EOSINOPHIL # BLD AUTO: 0.12 X10(3) UL (ref 0–0.7)
EOSINOPHIL NFR BLD AUTO: 1.2 %
ERYTHROCYTE [DISTWIDTH] IN BLOOD BY AUTOMATED COUNT: 14.3 % (ref 11–15)
FOLATE SERPL-MCNC: 17.9 NG/ML (ref 8.7–?)
HCT VFR BLD AUTO: 32.7 %
HGB BLD-MCNC: 10.4 G/DL
IMM GRANULOCYTES # BLD AUTO: 0.07 X10(3) UL (ref 0–1)
IMM GRANULOCYTES NFR BLD: 0.7 %
IRON SATN MFR SERPL: 26 %
IRON SERPL-MCNC: 73 UG/DL
LYMPHOCYTES # BLD AUTO: 1.38 X10(3) UL (ref 1–4)
LYMPHOCYTES NFR BLD AUTO: 13.6 %
MCH RBC QN AUTO: 31 PG (ref 26–34)
MCHC RBC AUTO-ENTMCNC: 31.8 G/DL (ref 31–37)
MCV RBC AUTO: 97.6 FL
MONOCYTES # BLD AUTO: 0.74 X10(3) UL (ref 0.1–1)
MONOCYTES NFR BLD AUTO: 7.3 %
NEUTROPHILS # BLD AUTO: 7.79 X10 (3) UL (ref 1.5–7.7)
NEUTROPHILS # BLD AUTO: 7.79 X10(3) UL (ref 1.5–7.7)
NEUTROPHILS NFR BLD AUTO: 76.8 %
PLATELET # BLD AUTO: 246 10(3)UL (ref 150–450)
RBC # BLD AUTO: 3.35 X10(6)UL
TIBC SERPL-MCNC: 280 UG/DL (ref 240–450)
TRANSFERRIN SERPL-MCNC: 188 MG/DL (ref 200–360)
VIT B12 SERPL-MCNC: 829 PG/ML (ref 193–986)
WBC # BLD AUTO: 10.1 X10(3) UL (ref 4–11)

## 2023-07-12 PROCEDURE — 36591 DRAW BLOOD OFF VENOUS DEVICE: CPT

## 2023-07-12 PROCEDURE — 84466 ASSAY OF TRANSFERRIN: CPT

## 2023-07-12 PROCEDURE — 99214 OFFICE O/P EST MOD 30 MIN: CPT | Performed by: INTERNAL MEDICINE

## 2023-07-12 PROCEDURE — 82746 ASSAY OF FOLIC ACID SERUM: CPT

## 2023-07-12 PROCEDURE — 85025 COMPLETE CBC W/AUTO DIFF WBC: CPT

## 2023-07-12 PROCEDURE — 82728 ASSAY OF FERRITIN: CPT

## 2023-07-12 PROCEDURE — 82607 VITAMIN B-12: CPT

## 2023-07-12 PROCEDURE — 83540 ASSAY OF IRON: CPT

## 2023-07-12 RX ORDER — HEPARIN SODIUM (PORCINE) LOCK FLUSH IV SOLN 100 UNIT/ML 100 UNIT/ML
5 SOLUTION INTRAVENOUS ONCE
OUTPATIENT
Start: 2023-07-12

## 2023-07-12 RX ORDER — SODIUM CHLORIDE 9 MG/ML
10 INJECTION INTRAVENOUS ONCE
OUTPATIENT
Start: 2023-07-12

## 2023-07-12 RX ORDER — HEPARIN SODIUM (PORCINE) LOCK FLUSH IV SOLN 100 UNIT/ML 100 UNIT/ML
5 SOLUTION INTRAVENOUS ONCE
Status: COMPLETED | OUTPATIENT
Start: 2023-07-12 | End: 2023-07-12

## 2023-07-12 RX ORDER — HEPARIN SODIUM (PORCINE) LOCK FLUSH IV SOLN 100 UNIT/ML 100 UNIT/ML
5 SOLUTION INTRAVENOUS ONCE
Status: CANCELLED | OUTPATIENT
Start: 2023-07-12

## 2023-07-12 RX ADMIN — HEPARIN SODIUM (PORCINE) LOCK FLUSH IV SOLN 100 UNIT/ML 500 UNITS: 100 SOLUTION INTRAVENOUS at 15:09:00

## 2023-07-12 NOTE — PROGRESS NOTES
Injectafer infusion not needed today per MD - labs stable.  deaccessed, pt discharged stable from MD appointment.

## 2023-07-13 ENCOUNTER — OFFICE VISIT (OUTPATIENT)
Dept: OPHTHALMOLOGY | Facility: CLINIC | Age: 83
End: 2023-07-13

## 2023-07-13 DIAGNOSIS — Z79.899 HIGH RISK MEDICATION USE: ICD-10-CM

## 2023-07-13 DIAGNOSIS — H40.003 GLAUCOMA SUSPECT OF BOTH EYES: Primary | ICD-10-CM

## 2023-07-13 DIAGNOSIS — H02.883 MEIBOMIAN GLAND DYSFUNCTION (MGD) OF BOTH EYES: ICD-10-CM

## 2023-07-13 DIAGNOSIS — H02.886 MEIBOMIAN GLAND DYSFUNCTION (MGD) OF BOTH EYES: ICD-10-CM

## 2023-07-13 DIAGNOSIS — E11.9 TYPE 2 DIABETES MELLITUS WITHOUT RETINOPATHY (HCC): ICD-10-CM

## 2023-07-13 DIAGNOSIS — Z96.1 PSEUDOPHAKIA OF BOTH EYES: ICD-10-CM

## 2023-07-13 DIAGNOSIS — H43.393 VITREOUS FLOATERS OF BOTH EYES: ICD-10-CM

## 2023-07-13 PROCEDURE — 1159F MED LIST DOCD IN RCRD: CPT | Performed by: OPHTHALMOLOGY

## 2023-07-13 PROCEDURE — 1160F RVW MEDS BY RX/DR IN RCRD: CPT | Performed by: OPHTHALMOLOGY

## 2023-07-13 PROCEDURE — 92014 COMPRE OPH EXAM EST PT 1/>: CPT | Performed by: OPHTHALMOLOGY

## 2023-07-13 PROCEDURE — 1126F AMNT PAIN NOTED NONE PRSNT: CPT | Performed by: OPHTHALMOLOGY

## 2023-07-13 NOTE — PATIENT INSTRUCTIONS
Pseudophakia of both eyes  No treatment  Continue with OTC reading glasses    Type 2 diabetes mellitus without retinopathy ([facility] [address]  Diabetes type II: no background of retinopathy, no signs of neovascularization noted. Discussed ocular and systemic benefits of blood sugar control. Diagnosis and treatment discussed in detail with patient. Vitreous floaters of both eyes   There is no evidence of retinal pathology. All signs and symptoms of retinal detachment/tears explained in detail. Patient instructed to call the office if they experience increase in floaters, increase in flashes of light, loss of vision or curtain or veil effect. Glaucoma suspect of both eyes  Pt had normal testing in 2022. We will have patient back next year for DM EE and Photos. High risk medication use   No evidence of plaquenil toxicity in either eye. Will follow in 1 year for a plaquenil eye exam based on current guidelines. Patient is approved to continue on plaquenil as directed by their PCP or rheumatologist.     Meibomian gland dysfunction (MGD) of both eyes  Patient was instructed to use warm compresses to the eyelids twice a day everyday. Instructions for warm compress use:   Patient should place wash compresses on both eyelids for 5 minutes every morning and every night. After 5 minutes of holding the warm compresses on the eyelids, patient should gently rub the eyelashes and then rinse thoroughly with warm water.

## 2023-07-13 NOTE — PROGRESS NOTES
Maranda Bolden is a 79year old female. HPI:     HPI    Pt in today for a diabetic and Plaquenil eye exam. Pt was started on Plaquenil about 2 months ago for Rheumatoid Arthritis and follows up with Dr. Reji Walden every 3 months. Pt states vision is stable with OTC readers. Pt has been a diabetic for 4-5 years       Pt's diabetes is currently controlled by pills   Pt checks BS 2x a day    Pt's last blood sugar was 151 yesterday  Last HA1C was 8.1 on 6/29/23  Endocrinologist: Jeffery Carrillo [address] [place] [address]     Consult: per Dr. Susi Ga  Last edited by Taryn Mckinnon OT on 7/13/2023  1:53 PM.        Patient History:  Past Medical History:   Diagnosis Date    Anxiety state     Cancer [facility])     breast cancer 2018    Chronic obstructive pulmonary disease, unspecified ([facility] 05/04/2023    Diabetes ([facility]     Essential hypertension     Exposure to medical diagnostic radiation     High blood pressure     High cholesterol     History of blood transfusion 07/2022    low hemoglobin    Osteoarthritis     PONV (postoperative nausea and vomiting)     Pulmonary emphysema (HCC)     Rheumatoid arthritis ([facility]        Surgical History: Maranda Bolden has a past surgical history that includes correct bunion,simple (right foot  1993); Trigger finger release (left hand  2005); hysterectomy (1972, no abnormal Paps, due to heavy bleeding with fibroids.   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 Katharine Ariza.) (Fredy fundoplication at [facility] Dr. Katharine Ariza.); total knee replacement (Left, 07/02/2015); colonoscopy; lumpectomy right; colonoscopy (N/A, 07/21/2022) (Procedure: COLONOSCOPY;  Surgeon: Morgan Wood MD;  Location: [facility]note 3[facility] ENDOSCOPY); Cataract extraction w/  intraocular lens implant (Bilateral, 2017) (Dr in [address] [place] [address] ); Yag Capsulotomy - OU - Both Eyes (Bilateral, 2021) (done in [address] [place] [address]); radiation right; and colonoscopy (N/A, 6/15/2023) (Procedure: COLONOSCOPY;  Surgeon: Alyson Jefferson MD;  Location: [facility]). Family History   Problem Relation Age of Onset    Breast Cancer Self 77    Heart Surgery Mother         Leaky valve at 37 y/o. Diabetes Maternal Grandmother     Diabetes Paternal Aunt     Breast Cancer Niece         before 48    Macular degeneration Neg     Glaucoma Neg        Social History:   Social History     Socioeconomic History    Marital status:    Tobacco Use    Smoking status: Former     Packs/day: 0.25     Types: Cigarettes     Quit date:      Years since quittin.5    Smokeless tobacco: Never    Tobacco comments:     [id number]   Vaping Use    Vaping Use: Never used   Substance and Sexual Activity    Alcohol use: Never    Drug use: Never   Social History Narrative    Just moved in from [address] [place] [address].  passed away and that is why moved from [address] [place] [address] to be with daughter who lives in [facility]. Currently lives with daughter. Medications:  Current Outpatient Medications   Medication Sig Dispense Refill    omeprazole 20 MG Oral Capsule Delayed Release Take 1 capsule (20 mg total) by mouth every morning. 30 capsule 0    leflunomide 20 MG Oral Tab Take 1 tablet (20 mg total) by mouth daily. LORATADINE 10 MG Oral Tab TAKE 1 TABLET BY MOUTH EVERY DAY 90 tablet 1    glycopyrrolate 1 MG Oral Tab Take 1 tablet (1 mg total) by mouth 3 (three) times daily. 90 tablet 1    carvedilol 25 MG Oral Tab Take 1 tablet (25 mg total) by mouth 2 (two) times daily with meals. 180 tablet 3    Glucose Blood (ACCU-CHEK GUIDE) In Vitro Strip Check blood sugar twice a day (fasting and before dinner). DX: E11.42, NIDDM 200 strip 1    BIOTIN OR Take by mouth.       glipiZIDE 5 MG Oral Tab Take 1 tablet (5 mg total) by mouth 2 (two) times daily before meals. 180 tablet 0    cyclobenzaprine 10 MG Oral Tab Take 1 tablet (10 mg total) by mouth nightly. Patient is taking one tablet by mouth nightly as needed for muscle spasms. 90 tablet 1    valsartan 80 MG Oral Tab Take 1 tablet (80 mg total) by mouth daily. 90 tablet 3    spironolactone 25 MG Oral Tab Take 2 tablets (50 mg total) by mouth daily. 180 tablet 1    methylPREDNISolone 4 MG Oral Tablet Therapy Pack Take as directed on package. (Patient not taking: Reported on 6/13/2023) 1 each 0    hydroxychloroquine 200 MG Oral Tab Take 1 tablet (200 mg total) by mouth daily. 90 tablet 0    fluticasone-salmeterol (WIXELA INHUB) 250-50 MCG/ACT Inhalation Aerosol Powder, Breath Activated Inhale 1 puff into the lungs every 12 (twelve) hours. 1 each 5    albuterol 108 (90 Base) MCG/ACT Inhalation Aero Soln inhale 2 puff by inhalation route  every 4 - 6 hours as needed 1 each 5    traMADol 50 MG Oral Tab Take 1 tablet (50 mg total) by mouth every 12 (twelve) hours as needed for Pain. 20 tablet 0    predniSONE 10 MG Oral Tab Take 30 mg for 2 days then 20 mg for 2 days then 10 mg for one day and after that continue with your regular (Patient taking differently: Take 0.5 tablets (5 mg total) by mouth daily.) 11 tablet 0    montelukast (SINGULAIR) 10 MG Oral Tab Take 1 tablet (10 mg total) by mouth nightly. 10 tablet 0    atorvastatin 80 MG Oral Tab Take 1 tablet (80 mg total) by mouth daily. 990 tablet 1    Accu-Chek FastClix Lancets Does not apply Misc Check sugar once daily as directed 100 each 2    Blood Glucose Monitoring Suppl (ONETOUCH VERIO) w/Device Does not apply Kit 1 each daily. Test 1x daily 1 kit 0    Glucose Blood (ONETOUCH VERIO) In Vitro Strip Test 1x daily 100 strip 1    OneTouch Delica Lancets 92G Does not apply Misc 4 x daily 100 each 1    Blood Glucose Monitoring Suppl (BLOOD GLUCOSE SYSTEM GILLES) Does not apply Kit DX E 11.9.   Checks every morning. 1 kit 0    Lancets Does not apply Misc DX E 11.9. Checks every morning. 50 each 11    Blood Glucose Monitoring Suppl w/Device Does not apply Kit DX E 11.9. Checks every morning. 1 kit 0    Blood Gluc Meter Disp-Strips Does not apply Device DX E 11.9. Checks every morning. 100 each 11    acetaminophen 500 MG Oral Tab Take 1 tablet (500 mg total) by mouth every 6 (six) hours as needed for Pain. clonazePAM 0.5 MG Oral Tab Take 1 tablet (0.5 mg total) by mouth 2 (two) times daily as needed for Anxiety. Patient is taking one tablet by mouth every night at bedtime as needed for anxiety. aspirin 81 MG Oral Tab EC Take 325 mg by mouth daily.          Allergies:    Celebrex [Celecoxib]    PALPITATIONS  Penicillins             ITCHING, SWELLING  Metformin And Relat*    UNKNOWN  Olmesartan              UNKNOWN    ROS:     ROS    Positive for: Endocrine, Eyes  Negative for: Constitutional, Gastrointestinal, Neurological, Skin, Genitourinary, Musculoskeletal, HENT, Cardiovascular, Respiratory, Psychiatric, Allergic/Imm, Heme/Lymph  Last edited by Rangel Wilhelm OT on 7/13/2023  1:31 PM.          PHYSICAL EXAM:     Base Eye Exam       Visual Acuity (Snellen - Linear)         Right Left    Dist sc 20/40 +1 20/30 +1    Dist ph sc 20/25 -2 20/20 -3    Near cc 20/20- 20/20              Tonometry (Applanation, 1:56 PM)         Right Left    Pressure 14 14              Pachymetry (7/30/2022)         Right Left    Thickness 574/-2 565/-1              Pupils         Pupils    Right PERRL    Left PERRL              Visual Fields         Left Right     Full Full              Extraocular Movement         Right Left     Full, Ortho Full, Ortho              Dilation       Both eyes: 1.0% Mydriacyl and 2.5% Alvin Synephrine @ 1:56 PM              Dilation #2       Both eyes: 1.0% Mydriacyl and 2.5% Alvin Synephrine @ 1:59 PM                  Additional Tests       Amsler         Right Left     Normal Normal Color         Right Left    Ishihara 5/5 5/5                  Slit Lamp and Fundus Exam       Slit Lamp Exam         Right Left    Lids/Lashes Dermatochalasis, Meibomian gland dysfunction Dermatochalasis, Meibomian gland dysfunction    Conjunctiva/Sclera Normal Normal    Cornea 2+ arcus 2+ arcus    Anterior Chamber Deep and quiet Deep and quiet    Iris Normal Normal    Lens PC IOL slightly decentered temporally with YAG PC IOL with  YAG    Vitreous Vitreous floaters Vitreous floaters              Fundus Exam         Right Left    Disc Sloping margin, Temporal crescent Sloping margin, Temporal crescent    C/D Ratio 0.8 0.85    Macula Normal-no BDR, no Plaquenil Toxicity Normal-no BDR, no Plaquenil Toxicity    Vessels Normal Normal    Periphery Normal Normal                  Refraction       Wearing Rx         Sphere Cylinder    Right +2.00 Sphere    Left +2.00 Sphere      Type: OTC reading only              Manifest Refraction    Pt declines refraction, happy with OTC readers only                     ASSESSMENT/PLAN:     Diagnoses and Plan:     Pseudophakia of both eyes  No treatment  Continue with OTC reading glasses    Type 2 diabetes mellitus without retinopathy ([address]  Diabetes type II: no background of retinopathy, no signs of neovascularization noted. Discussed ocular and systemic benefits of blood sugar control. Diagnosis and treatment discussed in detail with patient. Vitreous floaters of both eyes   There is no evidence of retinal pathology. All signs and symptoms of retinal detachment/tears explained in detail. Patient instructed to call the office if they experience increase in floaters, increase in flashes of light, loss of vision or curtain or veil effect. Glaucoma suspect of both eyes  Pt had normal testing in 2022. We will have patient back next year for DM EE and Photos. High risk medication use   No evidence of plaquenil toxicity in either eye.   Will follow in 1 year for a plaquenil eye exam based on current guidelines. Patient is approved to continue on plaquenil as directed by their PCP or rheumatologist.     Meibomian gland dysfunction (MGD) of both eyes  Patient was instructed to use warm compresses to the eyelids twice a day everyday. Instructions for warm compress use:   Patient should place wash compresses on both eyelids for 5 minutes every morning and every night. After 5 minutes of holding the warm compresses on the eyelids, patient should gently rub the eyelashes and then rinse thoroughly with warm water. No orders of the defined types were placed in this encounter. Meds This Visit:  Requested Prescriptions      No prescriptions requested or ordered in this encounter        Follow up instructions:  Return in about 1 year (around 7/13/2024) for Plaquenil and DM Eye Exam and photos .     7/13/2023  Scribed by: Alana Tello MD

## 2023-07-18 ENCOUNTER — OFFICE VISIT (OUTPATIENT)
Dept: OBGYN CLINIC | Facility: CLINIC | Age: 83
End: 2023-07-18

## 2023-07-18 VITALS
SYSTOLIC BLOOD PRESSURE: 166 MMHG | HEIGHT: 61 IN | BODY MASS INDEX: 28.04 KG/M2 | WEIGHT: 148.5 LBS | DIASTOLIC BLOOD PRESSURE: 88 MMHG

## 2023-07-18 DIAGNOSIS — R10.2 PELVIC PAIN: Primary | ICD-10-CM

## 2023-07-18 PROCEDURE — 1159F MED LIST DOCD IN RCRD: CPT | Performed by: OBSTETRICS & GYNECOLOGY

## 2023-07-18 PROCEDURE — 1160F RVW MEDS BY RX/DR IN RCRD: CPT | Performed by: OBSTETRICS & GYNECOLOGY

## 2023-07-18 PROCEDURE — 3077F SYST BP >= 140 MM HG: CPT | Performed by: OBSTETRICS & GYNECOLOGY

## 2023-07-18 PROCEDURE — 3008F BODY MASS INDEX DOCD: CPT | Performed by: OBSTETRICS & GYNECOLOGY

## 2023-07-18 PROCEDURE — 99213 OFFICE O/P EST LOW 20 MIN: CPT | Performed by: OBSTETRICS & GYNECOLOGY

## 2023-07-18 PROCEDURE — 3079F DIAST BP 80-89 MM HG: CPT | Performed by: OBSTETRICS & GYNECOLOGY

## 2023-07-18 RX ORDER — PREDNISONE 5 MG/1
5 TABLET ORAL DAILY
COMMUNITY
Start: 2023-06-26

## 2023-07-21 DIAGNOSIS — K90.9 MALABSORPTION OF IRON: ICD-10-CM

## 2023-07-21 DIAGNOSIS — D50.0 IRON DEFICIENCY ANEMIA DUE TO CHRONIC BLOOD LOSS: Primary | ICD-10-CM

## 2023-07-21 DIAGNOSIS — K62.5 RECTAL BLEEDING: ICD-10-CM

## 2023-07-25 RX ORDER — MONTELUKAST SODIUM 10 MG/1
10 TABLET ORAL NIGHTLY
Qty: 90 TABLET | Refills: 3 | Status: SHIPPED | OUTPATIENT
Start: 2023-07-25

## 2023-07-25 NOTE — TELEPHONE ENCOUNTER
Refill passed per [place] QA on Request, [facility] protocol.      Per chart review medication was discontinued per therapy completed 3/23    Requested Prescriptions   Pending Prescriptions Disp Refills    MONTELUKAST 10 MG Oral Tab [Pharmacy Med Name: MONTELUKAST SOD 10 MG TABLET] 90 tablet 1     Sig: TAKE 1 TABLET BY MOUTH EVERY DAY AT NIGHT       Asthma & COPD Medication Protocol Passed - 7/24/2023  3:06 PM        Passed - In person appointment or virtual visit in the past 6 mos or appointment in next 3 mos     Recent Outpatient Visits              1 week ago Pelvic pain    [facility], [address], Parker Jin MD    Office Visit    1 week ago Glaucoma suspect of both eyes    [address], [address], [place]    Office Visit    1 week ago Iron deficiency anemia due to chronic blood loss    [facility] Hematology Oncology Sandoval Murillo MD    Office Visit    1 week ago Encounter for care related to vascular access port    [address] Visit    1 week ago Iron deficiency anemia due to chronic blood loss    [address] [address]    Nurse Only          Future Appointments         Provider Department Appt Notes    In 2 weeks Chyna Eason MD [address], [address] [facility] 3 mo f/u    In 2 weeks SALVADOR Griffin -CBC-Iron studies-AMARILIS.md    In 2 weeks Sandoval Murillo, [address] [place] [address] Of [place] Hematology Oncology 1 m f/u.md    In 1 month Chiara Mata MD [address] [facility]     In 1 month Sharath Parker MD [address], [address] 6 mos    In 1 month MD Trung Banerjee [place] Rectal bleeding (Policy Informed)    In 2 months [address] [place] 6 mo fu    In 11 months Akin Driver MD [facility] Medical [facility], [address], [place] 1 YEAR F/U

## 2023-07-31 NOTE — TELEPHONE ENCOUNTER
Future Appointments   Date Time Provider Zehra Leon   8/10/2023 12:40 PM Rianna Gayle MD [facility]   8/14/2023 11:00 AM EM CC LAB2 [facility] CHEMO EMO   8/14/2023 12:30 PM Phuong Cortez MD [facility] HEM ONC EMO   8/25/2023  1:00 PM Margarita Gannon MD [facility]   9/19/2023  1:45 PM Ehsan Spence MD [facility]   9/20/2023  2:40 PM Marlen Samaniego MD [facility]   10/18/2023  1:45 PM BILL [facility]avez EC ADO   7/17/2024  1:45 PM Ede Brown MD [address] [place] [address]     This refill request is being sent to the provider for the following reason:  []Patient has not had an appointment within the past 12 months but has made an appointment on: ___  []Medication is not within protocol  [x]Patient did not complete follow up recommendations  []Other: ___

## 2023-08-06 RX ORDER — GLYCOPYRROLATE 1 MG/1
1 TABLET ORAL 3 TIMES DAILY
Qty: 90 TABLET | Refills: 1 | Status: SHIPPED | OUTPATIENT
Start: 2023-08-06

## 2023-08-10 ENCOUNTER — OFFICE VISIT (OUTPATIENT)
Dept: RHEUMATOLOGY | Facility: CLINIC | Age: 83
End: 2023-08-10

## 2023-08-10 VITALS
DIASTOLIC BLOOD PRESSURE: 79 MMHG | WEIGHT: 142 LBS | SYSTOLIC BLOOD PRESSURE: 159 MMHG | HEART RATE: 112 BPM | BODY MASS INDEX: 26.81 KG/M2 | HEIGHT: 61 IN

## 2023-08-10 DIAGNOSIS — M05.79 RHEUMATOID ARTHRITIS INVOLVING MULTIPLE SITES WITH POSITIVE RHEUMATOID FACTOR (HCC): Primary | ICD-10-CM

## 2023-08-10 DIAGNOSIS — Z51.81 MEDICATION MONITORING ENCOUNTER: ICD-10-CM

## 2023-08-10 PROCEDURE — 3077F SYST BP >= 140 MM HG: CPT | Performed by: INTERNAL MEDICINE

## 2023-08-10 PROCEDURE — 3008F BODY MASS INDEX DOCD: CPT | Performed by: INTERNAL MEDICINE

## 2023-08-10 PROCEDURE — 3078F DIAST BP <80 MM HG: CPT | Performed by: INTERNAL MEDICINE

## 2023-08-10 PROCEDURE — 99214 OFFICE O/P EST MOD 30 MIN: CPT | Performed by: INTERNAL MEDICINE

## 2023-08-10 PROCEDURE — 1160F RVW MEDS BY RX/DR IN RCRD: CPT | Performed by: INTERNAL MEDICINE

## 2023-08-10 PROCEDURE — 1159F MED LIST DOCD IN RCRD: CPT | Performed by: INTERNAL MEDICINE

## 2023-08-10 RX ORDER — HYDROXYCHLOROQUINE SULFATE 200 MG/1
200 TABLET, FILM COATED ORAL DAILY
Qty: 90 TABLET | Refills: 1 | Status: SHIPPED | OUTPATIENT
Start: 2023-08-10

## 2023-08-10 RX ORDER — PREDNISONE 5 MG/1
5 TABLET ORAL DAILY
Qty: 90 TABLET | Refills: 1 | Status: SHIPPED | OUTPATIENT
Start: 2023-08-10

## 2023-08-10 NOTE — PROGRESS NOTES
Therman Angelucci is a 79year old female. HPI:   Patient presents with: Follow - Up  Rheumatoid Arthritis      I had the pleasure of seeing Therman Angelucci on 8/10/2023 for follow up RA. Current medications:  Prednisone 5 mg daily- restarted in aug 2021   mg daily- started 2/27/2023  Tylenol arthritis 2 pills daily  Previous medications:  SSZ 1000 mg bid- started 11/2021-1/2021 had HA  Cymbalta 40 mg daily for neuropathy   Leflunomide 20 mg daily- started jan 2022- stopped 2/2023, had pain in b/l feet with n/t  Previous medication:  Methotrexate 2003  Blood work:  , CCP>340  ESR 85  Neg ACE    Interval History: This is a 78 yo F with hx of HTN, HLD, Breast cancer s/p lumpectomy and radiation (2017), ? Sarcoidosis, B/L TKR, B/L shoulder replacement presents to establish care for RA. She just moved from [place] about a month ago to [facility]. Her  passed away in February so there has been a lot of stress in her life. Diagnosed with RA back in 2003. At that time she reports stiffness and pain in all her joints. She could hardly get out of bed. Was hard to even make a fist or raise her arms. She was on Celebrex at that time and methotrexate. Was never put on any Biologics. She was then put on prednisone 20 mg daily and was on chronic prednisone for about 1.5 yrs. Was tapered down to approximately 3 mg daily. She then went off of prednisone for about 4 to 5 years and was doing well up until recently. She restarted prednisone when she was in [place] around August 2021 due to her joint pain. She was having pain in her hands, wrists, knees and ankles and swelling. She was started on prednisone 3 mg daily. In September she developed a cough and shortness of breath. Per patient she was seen by pulmonology and they diagnosed her with sarcoidosis based on her chest x-ray and CAT scan. No prior history of sarcoidosis.   No history of uveitis, erythema nodosum, fevers or night sweats. She does report weight loss about 20 pounds from February till November but she has not been eating well and her  passed away recently. Her prednisone was increased to 5 mg daily but now she is tapered down to 3 mg daily. She has an appointment with pulmonology in January. Do not have records of her chest x-ray or CT scan. Cough has resolved. Has minimal shortness of breath. 1/10/2022:   Presents for follow-up of rheumatoid arthritis  Currently on sulfasalazine at 1000 mg twice a day for the past month. Also on prednisone 3 mg daily  Continues to have diffuse joint pain and swelling  Currently has swelling and pain in her left MCP and wrists. Bilateral ankles. Reports a lot of stiffness in the morning. Sulfasalazine helped only mildly, also caused daily headaches    2/28/2022:  She presents for follow-up of rheumatoid arthritis  During her last visit in January she had a lot of joint pain and swelling involving her hands and wrists  She was started on high-dose of prednisone and now on prednisone 5 mg daily  She was also started leflunomide 20 mg daily  Joint pain and swelling has improved significantly. Minimal pain or stiffness in her hands and wrists. Able to make a full fist  Reports of swelling in her right foot mostly at the end of the day. Not very painful  Inflammatory markers remain elevated, ESR 80 and CRP 1.19    5/4/2022:  She presents for follow-up of rheumatoid arthritis  Remains on leflunomide 20 mg daily and prednisone 5 mg daily  She had blood work done on 5/2 showing hemoglobin of 6.0. She advised to go to the ED and received 2 units of blood. She was seen by her PCP and referred to GI  Further blood work does show iron deficiency anemia.  Which is already known, had iron transfusion in November 2021 and now iron pills   Joints are doing well overall, no swelling or stiffness  Has been having more pain in both feet jeimy at night, burning sensation with the tingling. Has to soak them in water at night. No symptoms during the day. She will be having an EMG done on June 28 8/8/2022:  Presents for follow-up of RA  At times she will have some stiffness in her hands in the morning but it goes away. No swelling  Left knee will swell at times  Continues to have tingling and numbness in her feet. She was seen by Dr. Manford Gosselin  EMG showed bilateral sural and medial plantar sensory nerve actions absent, it was consistent with sensorimotor peripheral neuropathy likely related to diabetes  She was on Cymbalta 40 mg daily but continues to have symptoms    11/14/2022:  Presents for follow-up of RA  Currently on Leflunomide 20 mg daily and prednisone 5 mg daily  Having stiffness and soreness in b/l hands in the morning. L hand all fingers, R hand is thumb and pinky finger  Some swelling in fingers, can make fist in AM but sore  L knee painful at times and swells, XR done showing left knee prosthesis is in place. Both shoulders and R knee have been replaced   Also taking tylenol arthritis 650 mg 2 pills BID and does help her pain  Also getting Iron infusion for low Hgb due to AVMs    2/27/2023:  Presents for follow-up of RA  Currently on leflunomide 20 mg daily and prednisone 5 mg daily  Continues to have a lot of pain involving her hands, wrist and elbows. Shoulders can hurt to  Also has some pain in her feet and ankles. Reports a lot of soreness all over  At times her hands and fingers can swell. She stopped Cymbalta and reports that her feet pain improved. She does not have neuropathy now  Blood work showed high inflammation markers    5/4/2023:  Presents for follow-up of RA  She stopped leflunomide a couple of months ago  Also off Cymbalta  During her last visit she was placed on Plaquenil 200 mg daily, still on predisone 5 mg daily.   She had a flare in April requiring a Medrol Dosepak  Recent blood work is showing elevated inflammation markers, ESR 64 in February ESR was 86 and CRP 2.37  Has pain daily but degree of pain is different every day  At times has swelling in hands, wrists but not daily  Since starting HCQ has been having diarrhea, has improved, goes 3 times a day  The pain in the feet and neuropathy, was on Cymbalta and helped but stopped the medication     8/10/2023:  Presents for follow-up of RA  On  mg daily and prednisone 5 mg daily  Will have some pain in the fingers and wrists, jeimy the r 5th finger, can make a fist  She was not improved for Cimzia but plan was to go through financial counselor. She states her joints are not too bad right now            HISTORY:  Past Medical History:   Diagnosis Date    Anxiety state     Cancer ([facility] [address]     breast cancer 2018    Chronic obstructive pulmonary disease, unspecified ([facility] [address] 05/04/2023    Diabetes ([facility] [address]     Essential hypertension     Exposure to medical diagnostic radiation     High blood pressure     High cholesterol     History of blood transfusion 07/2022    low hemoglobin    Osteoarthritis     PONV (postoperative nausea and vomiting)     Pulmonary emphysema (HCC)     Rheumatoid arthritis ([facility] [address]       Social Hx Reviewed   Family Hx Reviewed     Medications (Active prior to today's visit):  Current Outpatient Medications   Medication Sig Dispense Refill    glycopyrrolate 1 MG Oral Tab Take 1 tablet (1 mg total) by mouth 3 (three) times daily. 90 tablet 1    montelukast 10 MG Oral Tab Take 1 tablet (10 mg total) by mouth nightly. 90 tablet 3    omeprazole 20 MG Oral Capsule Delayed Release Take 1 capsule (20 mg total) by mouth every morning. 30 capsule 0    leflunomide 20 MG Oral Tab Take 1 tablet (20 mg total) by mouth daily. LORATADINE 10 MG Oral Tab TAKE 1 TABLET BY MOUTH EVERY DAY 90 tablet 1    carvedilol 25 MG Oral Tab Take 1 tablet (25 mg total) by mouth 2 (two) times daily with meals.  180 tablet 3    Glucose Blood (ACCU-CHEK GUIDE) In Vitro Strip Check blood sugar twice a day (fasting and before dinner). DX: E11.42, NIDDM 200 strip 1    BIOTIN OR Take by mouth. glipiZIDE 5 MG Oral Tab Take 1 tablet (5 mg total) by mouth 2 (two) times daily before meals. 180 tablet 0    cyclobenzaprine 10 MG Oral Tab Take 1 tablet (10 mg total) by mouth nightly. Patient is taking one tablet by mouth nightly as needed for muscle spasms. 90 tablet 1    valsartan 80 MG Oral Tab Take 1 tablet (80 mg total) by mouth daily. 90 tablet 3    spironolactone 25 MG Oral Tab Take 2 tablets (50 mg total) by mouth daily. 180 tablet 1    hydroxychloroquine 200 MG Oral Tab Take 1 tablet (200 mg total) by mouth daily. 90 tablet 0    fluticasone-salmeterol (WIXELA INHUB) 250-50 MCG/ACT Inhalation Aerosol Powder, Breath Activated Inhale 1 puff into the lungs every 12 (twelve) hours. 1 each 5    albuterol 108 (90 Base) MCG/ACT Inhalation Aero Soln inhale 2 puff by inhalation route  every 4 - 6 hours as needed 1 each 5    traMADol 50 MG Oral Tab Take 1 tablet (50 mg total) by mouth every 12 (twelve) hours as needed for Pain. 20 tablet 0    atorvastatin 80 MG Oral Tab Take 1 tablet (80 mg total) by mouth daily. 990 tablet 1    Accu-Chek FastClix Lancets Does not apply Misc Check sugar once daily as directed 100 each 2    Blood Glucose Monitoring Suppl (ONETOUCH VERIO) w/Device Does not apply Kit 1 each daily. Test 1x daily 1 kit 0    Glucose Blood (ONETOUCH VERIO) In Vitro Strip Test 1x daily 100 strip 1    OneTouch Delica Lancets 96H Does not apply Misc 4 x daily 100 each 1    Blood Glucose Monitoring Suppl (BLOOD GLUCOSE SYSTEM GILLES) Does not apply Kit DX E 11.9. Checks every morning. 1 kit 0    Lancets Does not apply Misc DX E 11.9. Checks every morning. 50 each 11    Blood Glucose Monitoring Suppl w/Device Does not apply Kit DX E 11.9. Checks every morning. 1 kit 0    Blood Gluc Meter Disp-Strips Does not apply Device DX E 11.9. Checks every morning.  100 each 11    acetaminophen 500 MG Oral Tab Take 1 tablet (500 mg total) by mouth every 6 (six) hours as needed for Pain. clonazePAM 0.5 MG Oral Tab Take 1 tablet (0.5 mg total) by mouth 2 (two) times daily as needed for Anxiety. Patient is taking one tablet by mouth every night at bedtime as needed for anxiety. aspirin 81 MG Oral Tab EC Take 325 mg by mouth daily. predniSONE 5 MG Oral Tab Take 1 tablet (5 mg total) by mouth daily. (Patient not taking: Reported on 8/10/2023)      methylPREDNISolone 4 MG Oral Tablet Therapy Pack Take as directed on package. (Patient not taking: Reported on 6/13/2023) 1 each 0    predniSONE 10 MG Oral Tab Take 30 mg for 2 days then 20 mg for 2 days then 10 mg for one day and after that continue with your regular (Patient not taking: Reported on 8/10/2023) 11 tablet 0     .cmed  Allergies:    Celebrex [Celecoxib]    PALPITATIONS  Penicillins             ITCHING, SWELLING  Metformin And Relat*    UNKNOWN  Olmesartan              UNKNOWN      ROS:   All other ROS are negative. PHYSICAL EXAM:   GEN: AAOx3, NAD  HEENT: EOMI, PERRLA, no injection or icterus, oral mucosa moist, no oral lesions. No lymphadenopathy. No facial rash  CVS: RRR, no murmurs rubs or gallops. Equal 2+ distal pulses. LUNGS: CTAB, no increased work of breathing  ABDOMEN:  soft NT/ND, +BS, no HSM  SKIN: No rashes or skin lesions. No nail findings  MSK:  Cervical spine: FROM  Hands: No swelling in her MCPs, able to make a full fist bilaterally, TTP in PIPs and MCPS  Wrist: No swelling in the wrist, nontender to palpation  Elbow: FROM, no pain or swelling or warmth on palpation  Shoulders: R shoulder limited ROM  Hip: normal log roll, no lateral hip pain, GLORY test negative b/l  Knees: FROM, no warmth or effusion present. No pain with ROM. Ankles: no swelling  Feet: no pain with MTP squeeze, no toe swelling or pain or warmth on palpation with FROM  Spine: no lumbar or sacral pain on palpation. NEURO: Cranial nerves II-XII intact grossly.  5/5 strength throughout in both upper and lower extremities, sensation intact. PSYCH: normal mood       LABS:     Component      Latest Ref Rng & Units 11/7/2021   SED RATE      0 - 30 mm/Hr 85 (H)   C-Citrullinated Peptide IgG AB      0.0 - 6.9 U/mL >340.0 (H)   RHEUMATOID FACTOR      <15 IU/mL 745 (H)     Imaging:     None    ASSESSMENT/PLAN:     Seropositive RA- stable   - Currently on hydroxychloroquine 200 mg daily and prednisone 5 mg daily  - At times she will have pain in her hands and wrists but not severe. 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 Getting IV iron infusions    Peripheral neuropathy- stable, resolved   - Was seen by Dr. 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   Wondering if she needs to go on treatment for the positive hepatitis B    Pt will f/u in 3 mos     Ligia Bergman MD  8/10/2023  12:40 PM

## 2023-08-11 RX ORDER — OMEPRAZOLE 20 MG/1
20 CAPSULE, DELAYED RELEASE ORAL EVERY MORNING
Qty: 30 CAPSULE | Refills: 0 | Status: SHIPPED | OUTPATIENT
Start: 2023-08-11

## 2023-08-11 NOTE — TELEPHONE ENCOUNTER
Requested Prescriptions     Pending Prescriptions Disp Refills    OMEPRAZOLE 20 MG Oral Capsule Delayed Release [Pharmacy Med Name: OMEPRAZOLE DR 20 MG CAPSULE] 30 capsule 0     Sig: TAKE 1 CAPSULE BY MOUTH EVERY DAY IN THE MORNING     LOV  6/7/23      LR   7/7/23      FL Closure 4 Information: This tab is for additional flaps and grafts above and beyond our usual structured repairs.  Please note if you enter information here it will not currently bill and you will need to add the billing information manually.

## 2023-08-14 ENCOUNTER — OFFICE VISIT (OUTPATIENT)
Dept: HEMATOLOGY/ONCOLOGY | Facility: HOSPITAL | Age: 83
End: 2023-08-14
Attending: INTERNAL MEDICINE

## 2023-08-14 ENCOUNTER — NURSE ONLY (OUTPATIENT)
Dept: HEMATOLOGY/ONCOLOGY | Facility: HOSPITAL | Age: 83
End: 2023-08-14
Attending: INTERNAL MEDICINE
Payer: MEDICARE

## 2023-08-14 VITALS
OXYGEN SATURATION: 100 % | BODY MASS INDEX: 27.15 KG/M2 | RESPIRATION RATE: 18 BRPM | WEIGHT: 143.81 LBS | TEMPERATURE: 98 F | DIASTOLIC BLOOD PRESSURE: 72 MMHG | HEART RATE: 110 BPM | SYSTOLIC BLOOD PRESSURE: 185 MMHG | HEIGHT: 61 IN

## 2023-08-14 DIAGNOSIS — D50.0 IRON DEFICIENCY ANEMIA DUE TO CHRONIC BLOOD LOSS: ICD-10-CM

## 2023-08-14 DIAGNOSIS — K90.9 MALABSORPTION OF IRON: ICD-10-CM

## 2023-08-14 DIAGNOSIS — D50.0 IRON DEFICIENCY ANEMIA DUE TO CHRONIC BLOOD LOSS: Primary | ICD-10-CM

## 2023-08-14 DIAGNOSIS — K62.5 RECTAL BLEEDING: ICD-10-CM

## 2023-08-14 DIAGNOSIS — Z45.2 ENCOUNTER FOR CARE RELATED TO VASCULAR ACCESS PORT: Primary | ICD-10-CM

## 2023-08-14 DIAGNOSIS — K31.819 AVM (ARTERIOVENOUS MALFORMATION) OF DUODENUM, ACQUIRED: ICD-10-CM

## 2023-08-14 LAB
BASOPHILS # BLD AUTO: 0.05 X10(3) UL (ref 0–0.2)
BASOPHILS NFR BLD AUTO: 0.4 %
DEPRECATED HBV CORE AB SER IA-ACNC: 239.9 NG/ML
DEPRECATED RDW RBC AUTO: 48.6 FL (ref 35.1–46.3)
EOSINOPHIL # BLD AUTO: 0.1 X10(3) UL (ref 0–0.7)
EOSINOPHIL NFR BLD AUTO: 0.8 %
ERYTHROCYTE [DISTWIDTH] IN BLOOD BY AUTOMATED COUNT: 13.8 % (ref 11–15)
HCT VFR BLD AUTO: 32.5 %
HGB BLD-MCNC: 10.3 G/DL
IMM GRANULOCYTES # BLD AUTO: 0.12 X10(3) UL (ref 0–1)
IMM GRANULOCYTES NFR BLD: 1 %
IRON SATN MFR SERPL: 17 %
IRON SERPL-MCNC: 47 UG/DL
LYMPHOCYTES # BLD AUTO: 1.61 X10(3) UL (ref 1–4)
LYMPHOCYTES NFR BLD AUTO: 13.7 %
MCH RBC QN AUTO: 30.6 PG (ref 26–34)
MCHC RBC AUTO-ENTMCNC: 31.7 G/DL (ref 31–37)
MCV RBC AUTO: 96.4 FL
MONOCYTES # BLD AUTO: 0.89 X10(3) UL (ref 0.1–1)
MONOCYTES NFR BLD AUTO: 7.6 %
NEUTROPHILS # BLD AUTO: 9 X10 (3) UL (ref 1.5–7.7)
NEUTROPHILS # BLD AUTO: 9 X10(3) UL (ref 1.5–7.7)
NEUTROPHILS NFR BLD AUTO: 76.5 %
PLATELET # BLD AUTO: 313 10(3)UL (ref 150–450)
RBC # BLD AUTO: 3.37 X10(6)UL
TIBC SERPL-MCNC: 283 UG/DL (ref 240–450)
TRANSFERRIN SERPL-MCNC: 190 MG/DL (ref 200–360)
WBC # BLD AUTO: 11.8 X10(3) UL (ref 4–11)

## 2023-08-14 PROCEDURE — 85025 COMPLETE CBC W/AUTO DIFF WBC: CPT

## 2023-08-14 PROCEDURE — 36591 DRAW BLOOD OFF VENOUS DEVICE: CPT

## 2023-08-14 PROCEDURE — 99214 OFFICE O/P EST MOD 30 MIN: CPT | Performed by: INTERNAL MEDICINE

## 2023-08-14 PROCEDURE — 84466 ASSAY OF TRANSFERRIN: CPT

## 2023-08-14 PROCEDURE — 83540 ASSAY OF IRON: CPT

## 2023-08-14 PROCEDURE — 82728 ASSAY OF FERRITIN: CPT

## 2023-08-14 RX ORDER — HEPARIN SODIUM (PORCINE) LOCK FLUSH IV SOLN 100 UNIT/ML 100 UNIT/ML
5 SOLUTION INTRAVENOUS ONCE
OUTPATIENT
Start: 2023-08-14

## 2023-08-14 RX ORDER — HEPARIN SODIUM (PORCINE) LOCK FLUSH IV SOLN 100 UNIT/ML 100 UNIT/ML
5 SOLUTION INTRAVENOUS ONCE
Status: COMPLETED | OUTPATIENT
Start: 2023-08-14 | End: 2023-08-14

## 2023-08-14 RX ORDER — SODIUM CHLORIDE 9 MG/ML
10 INJECTION INTRAVENOUS ONCE
OUTPATIENT
Start: 2023-08-14

## 2023-08-14 RX ADMIN — HEPARIN SODIUM (PORCINE) LOCK FLUSH IV SOLN 100 UNIT/ML 500 UNITS: 100 SOLUTION INTRAVENOUS at 11:17:00

## 2023-08-25 ENCOUNTER — OFFICE VISIT (OUTPATIENT)
Dept: INTERNAL MEDICINE CLINIC | Facility: CLINIC | Age: 83
End: 2023-08-25

## 2023-08-25 VITALS
WEIGHT: 141 LBS | RESPIRATION RATE: 18 BRPM | HEIGHT: 61 IN | TEMPERATURE: 97 F | DIASTOLIC BLOOD PRESSURE: 64 MMHG | OXYGEN SATURATION: 100 % | BODY MASS INDEX: 26.62 KG/M2 | HEART RATE: 84 BPM | SYSTOLIC BLOOD PRESSURE: 133 MMHG

## 2023-08-25 DIAGNOSIS — I10 PRIMARY HYPERTENSION: Primary | ICD-10-CM

## 2023-08-25 DIAGNOSIS — E11.9 TYPE 2 DIABETES MELLITUS WITHOUT RETINOPATHY (HCC): ICD-10-CM

## 2023-08-25 DIAGNOSIS — R04.2 HEMOPTYSIS: ICD-10-CM

## 2023-08-25 DIAGNOSIS — E78.5 DYSLIPIDEMIA: ICD-10-CM

## 2023-08-25 DIAGNOSIS — D50.0 IRON DEFICIENCY ANEMIA DUE TO CHRONIC BLOOD LOSS: ICD-10-CM

## 2023-08-25 DIAGNOSIS — Z71.85 VACCINE COUNSELING: ICD-10-CM

## 2023-08-25 PROCEDURE — 1160F RVW MEDS BY RX/DR IN RCRD: CPT | Performed by: INTERNAL MEDICINE

## 2023-08-25 PROCEDURE — 3008F BODY MASS INDEX DOCD: CPT | Performed by: INTERNAL MEDICINE

## 2023-08-25 PROCEDURE — 99214 OFFICE O/P EST MOD 30 MIN: CPT | Performed by: INTERNAL MEDICINE

## 2023-08-25 PROCEDURE — 1159F MED LIST DOCD IN RCRD: CPT | Performed by: INTERNAL MEDICINE

## 2023-08-25 PROCEDURE — 3078F DIAST BP <80 MM HG: CPT | Performed by: INTERNAL MEDICINE

## 2023-08-25 PROCEDURE — 3075F SYST BP GE 130 - 139MM HG: CPT | Performed by: INTERNAL MEDICINE

## 2023-08-25 NOTE — PATIENT INSTRUCTIONS
ASSESSMENT/PLAN:   Primary hypertension  (primary encounter diagnosis) Improved. Careful with diet and excercise at least 30 minutes 3-4 times a week. Check blood pressures at different times on different days. Can purchase own blood pressure monitor. If not, check at local pharmacy. Bake foods more and grill occasionally. Avoid fried foods. No salt. Use other seasonings. Dyslipidemia Stable. Iron deficiency anemia due to chronic blood loss Check blood 11-23. Type 2 diabetes mellitus without retinopathy (hcc)higher. Careful with diet and excercise at least 30 minutes 3-4 times a week. Check sugars at different times on different dates. Careful with low sugars. Carry something with you and check sugar if can. Can carry hanh cracker, etc. Decrease carbohydrates. But also, careful with fruits and natural sugars. One serving a day and no more than 1 handful every day. Check feet  every AM and careful with sores and ulcers on feet bilaterally. Check eyes every year with dilated eye exam.  Check sugars. 2-hour postmeal should be less than 140s. Pre-meal should be 's. Both equally affected A1c. Discussed importance of glycemic control to prevent complications of diabetes  -Discussed complications of diabetes include retinopathy, neuropathy, nephropathy and cardiovascular disease  -Discussed ABCs of DM  -Discussed importance of SBGM  -Discussed importance of low CHO diet, recommend 45gm per meal or 135gm per day  -UTD with optho    Vaccine counseling Up to date. Hemoptysis Check blood and CXR.      Orders Placed This Encounter      PTT, Activated      Prothrombin Time (PT)      CBC With Differential With Platelet      Meds This Visit:  Requested Prescriptions      No prescriptions requested or ordered in this encounter       Imaging & Referrals:  XR CHEST PA + LAT CHEST (CPT=[id number])      RTC 3 months and after 3-10-24 for physical.

## 2023-09-01 ENCOUNTER — OFFICE VISIT (OUTPATIENT)
Dept: OTOLARYNGOLOGY | Facility: CLINIC | Age: 83
End: 2023-09-01

## 2023-09-01 DIAGNOSIS — K12.2 ABSCESS OF MOUTH: Primary | ICD-10-CM

## 2023-09-01 PROCEDURE — 1160F RVW MEDS BY RX/DR IN RCRD: CPT | Performed by: OTOLARYNGOLOGY

## 2023-09-01 PROCEDURE — 1159F MED LIST DOCD IN RCRD: CPT | Performed by: OTOLARYNGOLOGY

## 2023-09-01 PROCEDURE — 99213 OFFICE O/P EST LOW 20 MIN: CPT | Performed by: OTOLARYNGOLOGY

## 2023-09-01 RX ORDER — CLINDAMYCIN HYDROCHLORIDE 300 MG/1
300 CAPSULE ORAL EVERY 8 HOURS
Qty: 30 CAPSULE | Refills: 0 | Status: SHIPPED | OUTPATIENT
Start: 2023-09-01 | End: 2023-09-11

## 2023-09-05 RX ORDER — OMEPRAZOLE 20 MG/1
20 CAPSULE, DELAYED RELEASE ORAL EVERY MORNING
Qty: 30 CAPSULE | Refills: 3 | Status: SHIPPED | OUTPATIENT
Start: 2023-09-05

## 2023-09-05 NOTE — TELEPHONE ENCOUNTER
Requested Prescriptions     Pending Prescriptions Disp Refills    OMEPRAZOLE 20 MG Oral Capsule Delayed Release [Pharmacy Med Name: OMEPRAZOLE DR 20 MG CAPSULE] 30 capsule 0     Sig: TAKE 1 CAPSULE BY MOUTH EVERY DAY IN THE MORNING           LOV  6/7/2023      LR   8/11/2023      [place]

## 2023-09-06 RX ORDER — GLYCOPYRROLATE 1 MG/1
1 TABLET ORAL 3 TIMES DAILY
Qty: 90 TABLET | Refills: 1 | Status: SHIPPED | OUTPATIENT
Start: 2023-09-06

## 2023-09-06 NOTE — TELEPHONE ENCOUNTER
This refill request is being sent to the provider for the following reason:  []Patient has not had an appointment within the past 12 months but has made an appointment on: ___  [x]Medication is not within protocol  []Patient did not complete follow up recommendations  []Other: ___  Dr. Gloria Cadet, Please review and send.  Thank you

## 2023-09-14 ENCOUNTER — OFFICE VISIT (OUTPATIENT)
Dept: HEMATOLOGY/ONCOLOGY | Facility: HOSPITAL | Age: 83
End: 2023-09-14
Attending: INTERNAL MEDICINE
Payer: MEDICARE

## 2023-09-14 VITALS — DIASTOLIC BLOOD PRESSURE: 60 MMHG | SYSTOLIC BLOOD PRESSURE: 159 MMHG

## 2023-09-14 DIAGNOSIS — D50.0 IRON DEFICIENCY ANEMIA DUE TO CHRONIC BLOOD LOSS: Primary | ICD-10-CM

## 2023-09-14 DIAGNOSIS — Z51.81 MEDICATION MONITORING ENCOUNTER: ICD-10-CM

## 2023-09-14 DIAGNOSIS — Z45.2 ENCOUNTER FOR CARE RELATED TO VASCULAR ACCESS PORT: ICD-10-CM

## 2023-09-14 DIAGNOSIS — M05.79 RHEUMATOID ARTHRITIS INVOLVING MULTIPLE SITES WITH POSITIVE RHEUMATOID FACTOR (HCC): ICD-10-CM

## 2023-09-14 DIAGNOSIS — K90.9 MALABSORPTION OF IRON: ICD-10-CM

## 2023-09-14 DIAGNOSIS — D50.0 IRON DEFICIENCY ANEMIA DUE TO CHRONIC BLOOD LOSS: ICD-10-CM

## 2023-09-14 LAB
ALBUMIN SERPL-MCNC: 2.8 G/DL (ref 3.4–5)
ALT SERPL-CCNC: 18 U/L
AST SERPL-CCNC: 20 U/L (ref 15–37)
BASOPHILS # BLD AUTO: 0.04 X10(3) UL (ref 0–0.2)
BASOPHILS NFR BLD AUTO: 0.4 %
CREAT BLD-MCNC: 0.92 MG/DL
CRP SERPL-MCNC: 1.1 MG/DL (ref ?–0.3)
DEPRECATED HBV CORE AB SER IA-ACNC: 97 NG/ML
DEPRECATED RDW RBC AUTO: 52.9 FL (ref 35.1–46.3)
EGFRCR SERPLBLD CKD-EPI 2021: 61 ML/MIN/1.73M2 (ref 60–?)
EOSINOPHIL # BLD AUTO: 0.1 X10(3) UL (ref 0–0.7)
EOSINOPHIL NFR BLD AUTO: 1 %
ERYTHROCYTE [DISTWIDTH] IN BLOOD BY AUTOMATED COUNT: 15.3 % (ref 11–15)
ERYTHROCYTE [SEDIMENTATION RATE] IN BLOOD: 91 MM/HR
HCT VFR BLD AUTO: 28 %
HGB BLD-MCNC: 8.7 G/DL
IMM GRANULOCYTES # BLD AUTO: 0.14 X10(3) UL (ref 0–1)
IMM GRANULOCYTES NFR BLD: 1.5 %
IRON SATN MFR SERPL: 12 %
IRON SERPL-MCNC: 38 UG/DL
LYMPHOCYTES # BLD AUTO: 1.37 X10(3) UL (ref 1–4)
LYMPHOCYTES NFR BLD AUTO: 14.2 %
MCH RBC QN AUTO: 29.8 PG (ref 26–34)
MCHC RBC AUTO-ENTMCNC: 31.1 G/DL (ref 31–37)
MCV RBC AUTO: 95.9 FL
MONOCYTES # BLD AUTO: 0.73 X10(3) UL (ref 0.1–1)
MONOCYTES NFR BLD AUTO: 7.6 %
NEUTROPHILS # BLD AUTO: 7.26 X10 (3) UL (ref 1.5–7.7)
NEUTROPHILS # BLD AUTO: 7.26 X10(3) UL (ref 1.5–7.7)
NEUTROPHILS NFR BLD AUTO: 75.3 %
PLATELET # BLD AUTO: 311 10(3)UL (ref 150–450)
RBC # BLD AUTO: 2.92 X10(6)UL
TIBC SERPL-MCNC: 314 UG/DL (ref 240–450)
TRANSFERRIN SERPL-MCNC: 211 MG/DL (ref 200–360)
WBC # BLD AUTO: 9.6 X10(3) UL (ref 4–11)

## 2023-09-14 PROCEDURE — 83540 ASSAY OF IRON: CPT

## 2023-09-14 PROCEDURE — 85652 RBC SED RATE AUTOMATED: CPT

## 2023-09-14 PROCEDURE — 86140 C-REACTIVE PROTEIN: CPT

## 2023-09-14 PROCEDURE — 82565 ASSAY OF CREATININE: CPT

## 2023-09-14 PROCEDURE — 84466 ASSAY OF TRANSFERRIN: CPT

## 2023-09-14 PROCEDURE — 85025 COMPLETE CBC W/AUTO DIFF WBC: CPT

## 2023-09-14 PROCEDURE — 96365 THER/PROPH/DIAG IV INF INIT: CPT

## 2023-09-14 PROCEDURE — 84450 TRANSFERASE (AST) (SGOT): CPT

## 2023-09-14 PROCEDURE — 82040 ASSAY OF SERUM ALBUMIN: CPT

## 2023-09-14 PROCEDURE — 84460 ALANINE AMINO (ALT) (SGPT): CPT

## 2023-09-14 PROCEDURE — 82728 ASSAY OF FERRITIN: CPT

## 2023-09-14 RX ORDER — SODIUM CHLORIDE 9 MG/ML
10 INJECTION INTRAVENOUS ONCE
OUTPATIENT
Start: 2023-09-14

## 2023-09-14 RX ORDER — HEPARIN SODIUM (PORCINE) LOCK FLUSH IV SOLN 100 UNIT/ML 100 UNIT/ML
5 SOLUTION INTRAVENOUS ONCE
OUTPATIENT
Start: 2023-09-14

## 2023-09-15 ENCOUNTER — HOSPITAL ENCOUNTER (OUTPATIENT)
Dept: ULTRASOUND IMAGING | Facility: HOSPITAL | Age: 83
Discharge: HOME | End: 2023-09-15
Attending: OBSTETRICS & GYNECOLOGY
Payer: MEDICARE

## 2023-09-15 ENCOUNTER — OFFICE VISIT (OUTPATIENT)
Dept: OTOLARYNGOLOGY | Facility: CLINIC | Age: 83
End: 2023-09-15

## 2023-09-15 VITALS — TEMPERATURE: 97 F | BODY MASS INDEX: 26.62 KG/M2 | WEIGHT: 141 LBS | HEIGHT: 61 IN

## 2023-09-15 DIAGNOSIS — R10.2 PELVIC PAIN: ICD-10-CM

## 2023-09-15 DIAGNOSIS — K12.2 ABSCESS OF MOUTH: Primary | ICD-10-CM

## 2023-09-15 PROCEDURE — 3008F BODY MASS INDEX DOCD: CPT | Performed by: OTOLARYNGOLOGY

## 2023-09-15 PROCEDURE — 76856 US EXAM PELVIC COMPLETE: CPT | Performed by: OBSTETRICS & GYNECOLOGY

## 2023-09-15 PROCEDURE — 99213 OFFICE O/P EST LOW 20 MIN: CPT | Performed by: OTOLARYNGOLOGY

## 2023-09-15 PROCEDURE — 76830 TRANSVAGINAL US NON-OB: CPT | Performed by: OBSTETRICS & GYNECOLOGY

## 2023-09-15 PROCEDURE — 1160F RVW MEDS BY RX/DR IN RCRD: CPT | Performed by: OTOLARYNGOLOGY

## 2023-09-15 PROCEDURE — 1159F MED LIST DOCD IN RCRD: CPT | Performed by: OTOLARYNGOLOGY

## 2023-09-15 RX ORDER — CLINDAMYCIN HYDROCHLORIDE 300 MG/1
300 CAPSULE ORAL EVERY 8 HOURS
Qty: 30 CAPSULE | Refills: 0 | Status: SHIPPED | OUTPATIENT
Start: 2023-09-15 | End: 2023-09-25

## 2023-09-19 ENCOUNTER — OFFICE VISIT (OUTPATIENT)
Dept: PULMONOLOGY | Facility: CLINIC | Age: 83
End: 2023-09-19

## 2023-09-19 VITALS
RESPIRATION RATE: 16 BRPM | OXYGEN SATURATION: 95 % | BODY MASS INDEX: 27 KG/M2 | SYSTOLIC BLOOD PRESSURE: 143 MMHG | DIASTOLIC BLOOD PRESSURE: 79 MMHG | HEART RATE: 85 BPM | WEIGHT: 141 LBS

## 2023-09-19 DIAGNOSIS — J44.9 CHRONIC OBSTRUCTIVE PULMONARY DISEASE, UNSPECIFIED COPD TYPE (HCC): Primary | ICD-10-CM

## 2023-09-19 PROCEDURE — 99213 OFFICE O/P EST LOW 20 MIN: CPT | Performed by: INTERNAL MEDICINE

## 2023-09-19 PROCEDURE — 3078F DIAST BP <80 MM HG: CPT | Performed by: INTERNAL MEDICINE

## 2023-09-19 PROCEDURE — 1159F MED LIST DOCD IN RCRD: CPT | Performed by: INTERNAL MEDICINE

## 2023-09-19 PROCEDURE — 3077F SYST BP >= 140 MM HG: CPT | Performed by: INTERNAL MEDICINE

## 2023-09-19 PROCEDURE — 1126F AMNT PAIN NOTED NONE PRSNT: CPT | Performed by: INTERNAL MEDICINE

## 2023-09-19 RX ORDER — ALBUTEROL SULFATE 90 UG/1
AEROSOL, METERED RESPIRATORY (INHALATION)
Qty: 1 EACH | Refills: 0 | Status: SHIPPED | OUTPATIENT
Start: 2023-09-19

## 2023-09-19 NOTE — PROGRESS NOTES
The patient is an 79-year-old female who I know well from prior evaluation comes in now for follow-up. Her breathing is good. There is no significant change. She needs a refill on her albuterol. Review of Systems:  Vision normal. Ear nose and throat normal. Bowel normal. Bladder function normal. No depression. No thyroid disease. No lymphatic system concerns. No rash. Muscles and joints unremarkable. No weight loss no weight gain. Physical Examination:  Vital signs normal. HEENT examination is unremarkable with pupils equal round and reactive to light and accommodation. Neck without adenopathy, thyromegaly, JVD nor bruit. Lungs clear to auscultation and percussion. Cardiac regular rate and rhythm no murmur. Abdomen nontender, without hepatosplenomegaly and no mass appreciable. Extremities and Musculoskeletal without clubbing cyanosis nor edema, and mobility acceptable. Neurologic grossly intact with symmetric tone and strength and reflex. Assessment and plan:  1. 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 We will continue current therapy, see me again in a year, contact me promptly if new trouble and refilled albuterol. 2.  Questionable history of sarcoid-conservative management  3. Thyroid nodule-multiple benign cysts identified on ultrasound.

## 2023-09-20 ENCOUNTER — OFFICE VISIT (OUTPATIENT)
Facility: CLINIC | Age: 83
End: 2023-09-20

## 2023-09-20 ENCOUNTER — TELEPHONE (OUTPATIENT)
Facility: CLINIC | Age: 83
End: 2023-09-20

## 2023-09-20 VITALS
HEIGHT: 61 IN | DIASTOLIC BLOOD PRESSURE: 73 MMHG | BODY MASS INDEX: 27.56 KG/M2 | SYSTOLIC BLOOD PRESSURE: 129 MMHG | HEART RATE: 85 BPM | WEIGHT: 146 LBS

## 2023-09-20 DIAGNOSIS — D64.9 ANEMIA, UNSPECIFIED TYPE: ICD-10-CM

## 2023-09-20 DIAGNOSIS — Q27.30 AVM (ARTERIOVENOUS MALFORMATION): Primary | ICD-10-CM

## 2023-09-20 DIAGNOSIS — K62.5 RECTAL BLEEDING: Primary | ICD-10-CM

## 2023-09-20 PROCEDURE — 1126F AMNT PAIN NOTED NONE PRSNT: CPT | Performed by: INTERNAL MEDICINE

## 2023-09-20 PROCEDURE — 3074F SYST BP LT 130 MM HG: CPT | Performed by: INTERNAL MEDICINE

## 2023-09-20 PROCEDURE — 1159F MED LIST DOCD IN RCRD: CPT | Performed by: INTERNAL MEDICINE

## 2023-09-20 PROCEDURE — 3078F DIAST BP <80 MM HG: CPT | Performed by: INTERNAL MEDICINE

## 2023-09-20 PROCEDURE — 3008F BODY MASS INDEX DOCD: CPT | Performed by: INTERNAL MEDICINE

## 2023-09-20 PROCEDURE — 1160F RVW MEDS BY RX/DR IN RCRD: CPT | Performed by: INTERNAL MEDICINE

## 2023-09-20 PROCEDURE — 99214 OFFICE O/P EST MOD 30 MIN: CPT | Performed by: INTERNAL MEDICINE

## 2023-09-20 NOTE — TELEPHONE ENCOUNTER
Scheduled for:  Video Capsule Endoscopy [id number]  Provider Name:  Dr Negrito Geller  Date:  9/29/2023 ok'd by Jorge Luis Aiken charge nurse/ENDO  Location:  [facility]  Sedation:  None  Time:  6:45 am.  Prep:   MiraLAX prep with 1 glass of MiraLAX to be taken the evening before procedure. Meds/Allergies Reconciled?:  Physician Reviewed  Diagnosis with codes:    AVM Q27.33  Was patient informed to call insurance with codes (Y/N):  Yes  Referral sent?:  Referral was sent at the time of electronic surgical scheduling. [facility] or [address]  notified?:  I sent an electronic request to Endo Scheduling and received a confirmation today. Medication Orders:  Pt is aware to NOT take iron pills, herbal meds and diet supplements for 7 days before exam. Also to NOT take any form of alcohol, recreational drugs and any forms of ED meds 24 hours before exam.   Misc Orders:  N/A   Further instructions given by staff:  I discussed the prep instructions with the patient which she verbally understood. I provided patient with prep instruction's sheet in office. Patient was informed about the new cancellation policy for his/her procedure. Patient was also given a copy of the cancellation policy at the time of the appointment and verbalized understanding.

## 2023-09-20 NOTE — PROGRESS NOTES
Lynn Mooney is a 79year old female. HPI:   Patient presents with: Follow - Up: Rectal bleeding     The patient is an 48-year-old female who has a history of breast cancer pretension, emphysema, rheumatoid arthritis. The patient has had chronic anemia GI blood loss, iron deficiency. Extensive previous evaluation over the last several years including [facility]/[place] as well as here. She also recounts seeing or passing a blood clot. Blood counts are in the stable for her but continue to remain anemic. HISTORY:  Past Medical History:   Diagnosis Date    Anxiety state     Cancer [facility])     breast cancer 2018    Chronic obstructive pulmonary disease, unspecified ([facility] 05/04/2023    Diabetes ([facility]     Essential hypertension     Exposure to medical diagnostic radiation     High blood pressure     High cholesterol     History of blood transfusion 07/2022    low hemoglobin    Osteoarthritis     PONV (postoperative nausea and vomiting)     Pulmonary emphysema (HCC)     Rheumatoid arthritis ([facility]       Past Surgical History:   Procedure Laterality Date    CATARACT EXTRACTION W/  INTRAOCULAR LENS IMPLANT Bilateral 2017    Dr in [address] [place] [address]     COLONOSCOPY      COLONOSCOPY N/A 07/21/2022    Procedure: COLONOSCOPY;  Surgeon: Agustin Chen MD;  Location: [address] [place] [address] ENDOSCOPY    COLONOSCOPY N/A 6/15/2023    Procedure: COLONOSCOPY;  Surgeon: Agustin Chen MD;  Location: [facility] [address]      right foot  [address], no abnormal Paps, due to heavy bleeding with fibroids. Not sure if it had bilateral salpingo-oophorectomy. IR ANEURYSM REPAIRM  2010    Computer assisted volumetric craniofomy with clipping of anterior cerebral artery.     LUMPECTOMY RIGHT      RADIATION RIGHT      ROTATOR CUFF REPAIR      1993    TOTAL KNEE REPLACEMENT Right 2010    TOTAL KNEE REPLACEMENT Left 07/02/2015    TRANSORAL INCISIONLESS FUNDOPLICATION - INTERNAL  01/25/2012 Fredy fundoplication at [facility] Dr. Annelise Wong. Fredy fundoplication at [facility] Dr. Annelise Wong. TRIGGER FINGER RELEASE      left hand      YAG CAPSULOTOMY - OU - BOTH EYES Bilateral 2021    done in [place]      Family History   Problem Relation Age of Onset    Breast Cancer Self 77    Heart Surgery Mother         Leaky valve at 35 y/o. Diabetes Maternal Grandmother     Diabetes Paternal Aunt     Breast Cancer Niece         before 48    Macular degeneration Neg     Glaucoma Neg       Social History:   Social History     Socioeconomic History    Marital status:    Tobacco Use    Smoking status: Former     Packs/day: .25     Types: Cigarettes     Quit date:      Years since quittin.7    Smokeless tobacco: Never    Tobacco comments:     [id number]   Vaping Use    Vaping Use: Never used   Substance and Sexual Activity    Alcohol use: Never    Drug use: Never   Social History Narrative    Just moved in from [place].  passed away and that is why moved from [place] to be with daughter who lives in [facility]. Currently lives with daughter. Medications (Active prior to today's visit):  Current Outpatient Medications   Medication Sig Dispense Refill    albuterol 108 (90 Base) MCG/ACT Inhalation Aero Soln inhale 2 puff by inhalation route  every 4 - 6 hours as needed 1 each 0    clindamycin 300 MG Oral Cap Take 1 capsule (300 mg total) by mouth every 8 (eight) hours for 10 days. 30 capsule 0    glycopyrrolate 1 MG Oral Tab Take 1 tablet (1 mg total) by mouth 3 (three) times daily. 90 tablet 1    omeprazole 20 MG Oral Capsule Delayed Release Take 1 capsule (20 mg total) by mouth every morning. 30 capsule 3    hydroxychloroquine 200 MG Oral Tab Take 1 tablet (200 mg total) by mouth daily. 90 tablet 1    predniSONE 5 MG Oral Tab Take 1 tablet (5 mg total) by mouth daily.  90 tablet 1    montelukast 10 MG Oral Tab Take 1 tablet (10 mg total) by mouth nightly. 90 tablet 3    leflunomide 20 MG Oral Tab Take 1 tablet (20 mg total) by mouth daily. LORATADINE 10 MG Oral Tab TAKE 1 TABLET BY MOUTH EVERY DAY 90 tablet 1    carvedilol 25 MG Oral Tab Take 1 tablet (25 mg total) by mouth 2 (two) times daily with meals. 180 tablet 3    Glucose Blood (ACCU-CHEK GUIDE) In Vitro Strip Check blood sugar twice a day (fasting and before dinner). DX: E11.42, NIDDM 200 strip 1    BIOTIN OR Take by mouth. glipiZIDE 5 MG Oral Tab Take 1 tablet (5 mg total) by mouth 2 (two) times daily before meals. 180 tablet 0    cyclobenzaprine 10 MG Oral Tab Take 1 tablet (10 mg total) by mouth nightly. Patient is taking one tablet by mouth nightly as needed for muscle spasms. 90 tablet 1    valsartan 80 MG Oral Tab Take 1 tablet (80 mg total) by mouth daily. 90 tablet 3    spironolactone 25 MG Oral Tab Take 2 tablets (50 mg total) by mouth daily. 180 tablet 1    methylPREDNISolone 4 MG Oral Tablet Therapy Pack Take as directed on package. 1 each 0    fluticasone-salmeterol (WIXELA INHUB) 250-50 MCG/ACT Inhalation Aerosol Powder, Breath Activated Inhale 1 puff into the lungs every 12 (twelve) hours. 1 each 5    albuterol 108 (90 Base) MCG/ACT Inhalation Aero Soln inhale 2 puff by inhalation route  every 4 - 6 hours as needed 1 each 5    traMADol 50 MG Oral Tab Take 1 tablet (50 mg total) by mouth every 12 (twelve) hours as needed for Pain. 20 tablet 0    predniSONE 10 MG Oral Tab Take 30 mg for 2 days then 20 mg for 2 days then 10 mg for one day and after that continue with your regular 11 tablet 0    atorvastatin 80 MG Oral Tab Take 1 tablet (80 mg total) by mouth daily. 990 tablet 1    Accu-Chek FastClix Lancets Does not apply Misc Check sugar once daily as directed 100 each 2    Blood Glucose Monitoring Suppl (ONETOUCH VERIO) w/Device Does not apply Kit 1 each daily.  Test 1x daily 1 kit 0    Glucose Blood (ONETOUCH VERIO) In Vitro Strip Test 1x daily 100 strip 1    OneTouch Delica Lancets 33G Does not apply Misc 4 x daily 100 each 1    Blood Glucose Monitoring Suppl (BLOOD GLUCOSE SYSTEM GILLES) Does not apply Kit DX E 11.9. Checks every morning. 1 kit 0    Lancets Does not apply Misc DX E 11.9. Checks every morning. 50 each 11    Blood Glucose Monitoring Suppl w/Device Does not apply Kit DX E 11.9. Checks every morning. 1 kit 0    Blood Gluc Meter Disp-Strips Does not apply Device DX E 11.9. Checks every morning. 100 each 11    acetaminophen 500 MG Oral Tab Take 1 tablet (500 mg total) by mouth every 6 (six) hours as needed for Pain. clonazePAM 0.5 MG Oral Tab Take 1 tablet (0.5 mg total) by mouth 2 (two) times daily as needed for Anxiety. Patient is taking one tablet by mouth every night at bedtime as needed for anxiety. aspirin 81 MG Oral Tab EC Take 325 mg by mouth daily. Allergies:    Celebrex [Celecoxib]    PALPITATIONS  Penicillins             ITCHING, SWELLING  Metformin And Relat*    UNKNOWN  Olmesartan              UNKNOWN      ROS:   The patient denies any chest pain or shortness of breath,  No neurologic or dermatologic symptoms. PHYSICAL EXAM:   Blood pressure 129/73, pulse 85, height 5' 1\" (1.549 m), weight 146 lb (66.2 kg). The patient appears their stated age and is in no acute distress  HEENT- anicteric sclera, neck no lymphadnopathy, OP- clear with no masses or lesions  Chest- Clear bilaterally, no wheezing,  Heart- regular rate, no murmur or gallop  Abdomen- Soft and nontender, nondistended  Ext- no clubbing or cyanosis  Skin- no rashes or lesions  Neuro- appropriate response, alert, no confusion  . ASSESSMENT/PLAN:   Assessment     The patient is a history of chronic anemia, we discussed AVMs which has been previously diagnosed with and treated. The potential for other small bowel AVMs was reviewed and neck steps in evaluation were discussed. We had discussed potential side effects of capsule for further evaluation.   Risk of capsule retention at 4% were reviewed. We will send a message to Dr. Regan Ngo regarding potential octreotide therapy. 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 Previously treated. Suspect possible AVMs further down in the small bowel and advised capsule endoscopy. MiraLAX prep with 1 glass of MiraLAX to be taken the evening before procedure.  -Continue to follow hemoglobin and IV iron infusions as per Dr. Regan Ngo  -Consider octreotide, will let this determination per Dr. Regan Ngo  -Consideration for follow-up at [address] for deep enteroscopy. Orders This Visit:  No orders of the defined types were placed in this encounter.       Meds This Visit:  Requested Prescriptions      No prescriptions requested or ordered in this encounter       Imaging & Referrals:  None       Silas Story, [address] [place] [address] Gastroenterology

## 2023-09-20 NOTE — PATIENT INSTRUCTIONS
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 Previously treated. Suspect possible AVMs further down in the small bowel and advised capsule endoscopy. MiraLAX prep with 1 glass of MiraLAX to be taken the evening before procedure.  -Continue to follow hemoglobin and IV iron infusions as per Dr. Mya Monique  -Consider octreotide, will let this determination per Dr. Mya Monique  -Consideration for follow-up at [address] for deep enteroscopy.

## 2023-09-21 NOTE — TELEPHONE ENCOUNTER
Dr. Vazquez Beat    Please sign off on office visit from yesterday so prior auth team can obtain authorization for the capsule on 9/29/23.      Thank you

## 2023-09-25 ENCOUNTER — TELEPHONE (OUTPATIENT)
Dept: OTOLARYNGOLOGY | Facility: CLINIC | Age: 83
End: 2023-09-25

## 2023-09-25 NOTE — TELEPHONE ENCOUNTER
angela from [employer] called. Received an order but the sample is unlabeled. Will send a fax to the office. Complete and fax back.

## 2023-09-25 NOTE — TELEPHONE ENCOUNTER
[facility] by [facility], routed to [facility].

## 2023-09-27 ENCOUNTER — TELEPHONE (OUTPATIENT)
Dept: INTERNAL MEDICINE CLINIC | Facility: CLINIC | Age: 83
End: 2023-09-27

## 2023-09-27 NOTE — TELEPHONE ENCOUNTER
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 Planning a capsule to evaluate the small intestine but wanted to see if thought to be any utility for starting on octreotide to see if this would help slow down AVM formation and bleeding.   Thanks  Talib

## 2023-09-29 ENCOUNTER — OFFICE VISIT (OUTPATIENT)
Dept: OTOLARYNGOLOGY | Facility: CLINIC | Age: 83
End: 2023-09-29

## 2023-09-29 DIAGNOSIS — K12.2 ABSCESS OF MOUTH: Primary | ICD-10-CM

## 2023-09-29 PROCEDURE — 1160F RVW MEDS BY RX/DR IN RCRD: CPT | Performed by: OTOLARYNGOLOGY

## 2023-09-29 PROCEDURE — 99213 OFFICE O/P EST LOW 20 MIN: CPT | Performed by: OTOLARYNGOLOGY

## 2023-09-29 PROCEDURE — 1159F MED LIST DOCD IN RCRD: CPT | Performed by: OTOLARYNGOLOGY

## 2023-09-29 RX ORDER — CLINDAMYCIN HYDROCHLORIDE 300 MG/1
300 CAPSULE ORAL EVERY 8 HOURS
Qty: 30 CAPSULE | Refills: 0 | Status: SHIPPED | OUTPATIENT
Start: 2023-09-29 | End: 2023-10-09

## 2023-09-29 NOTE — PROGRESS NOTES
Cecil Sharma is a 79year old female. Patient presents with: Follow - Up: Abscess of mouth f/up      HISTORY OF PRESENT ILLNESS  Presents with 2 complaints. Has been having ringing for a few years on and off states that it seems to be getting more constant more recently. Denies any hearing loss. Audiogram demonstrates mild downsloping to moderate high-frequency hearing loss at the highest frequencies. Normal tympanograms this speech discrimination scores around 90% bilaterally. Also complains of rhinitis with postnasal dri and associated cough and throat clearing as well as rhinorrhea with meals. No nasal mucopurulence. Cough is nonproductive. 5/11/23 improved cough since I last saw her. Started on on oral inhaler and a powdered form by her pulmonologist.  Also use Claritin Astelin and montelukast stopped the spray due to nosebleed. Overall throat pain is gone but still having gustatory rhinitis and some postnasal discharge. 9/1/23 nasal spray started causing her some nosebleeds. Continue use ofd Claritin montelukast and we started her on glycopyrrolate. She has been instructed to avoid use of the spray anymore. Still having some pain and discomfort on her palate. Does state that she moves her dentures into place and usually will rub out the glue at the end of the day. States that the last time I saw her in May was having some discomfort but things actually worsened. 9/15/23 last visit noted to have a palatal abscess. Thought to be perhaps due to use of a ill fitting denture. Feels much better with no pain or discomfort in her mouth at the moment. She has still been using her dentures to eat. She has been on clindamycin for 10 days so far.    9/29/23 doing okay. Palatal abscess. Appears to be related to her use of dentures and glue to hold the denture in place so that she can eat. She has been on clindamycin for second course.   Previous culture taken demonstrates oral edd. No other significant signs, symptoms or complaints    Social History    Socioeconomic History      Marital status:     Tobacco Use      Smoking status: Former        Packs/day: .25        Types: Cigarettes        Quit date:         Years since quittin.7      Smokeless tobacco: Never      Tobacco comments: [id number]    Vaping Use      Vaping Use: Never used    Substance and Sexual Activity      Alcohol use: Never      Drug use: Never      Family History   Problem Relation Age of Onset    Breast Cancer Self 77    Heart Surgery Mother         Leaky valve at 37 y/o. Diabetes Maternal Grandmother     Diabetes Paternal Aunt     Breast Cancer Niece         before 48    Macular degeneration Neg     Glaucoma Neg        Past Medical History:   Diagnosis Date    Anxiety state     Cancer [facility])     breast cancer 2018    Chronic obstructive pulmonary disease, unspecified ([facility] 2023    Diabetes ([facility]     Essential hypertension     Exposure to medical diagnostic radiation     High blood pressure     High cholesterol     History of blood transfusion 2022    low hemoglobin    Osteoarthritis     PONV (postoperative nausea and vomiting)     Pulmonary emphysema (HCC)     Rheumatoid arthritis ([facility]        Past Surgical History:   Procedure Laterality Date    CATARACT EXTRACTION W/  INTRAOCULAR LENS IMPLANT Bilateral     Dr in [address] [place] [address]     COLONOSCOPY      COLONOSCOPY N/A 2022    Procedure: COLONOSCOPY;  Surgeon: Matthew Carranza MD;  Location: [address] [place] [address] ENDOSCOPY    COLONOSCOPY N/A 6/15/2023    Procedure: COLONOSCOPY;  Surgeon: Matthew Carranza MD;  Location:  [address]      [facility], due to heavy bleeding with fibroids. Not sure if it had bilateral salpingo-oophorectomy. IR ANEURYSM REPAIRM      Computer assisted volumetric craniofomy with clipping of anterior cerebral artery.     LUMPECTOMY RIGHT RADIATION RIGHT      ROTATOR CUFF REPAIR      1993    TOTAL KNEE REPLACEMENT Right 2010    TOTAL KNEE REPLACEMENT Left 07/02/2015    TRANSORAL INCISIONLESS FUNDOPLICATION - INTERNAL  32/40/7687 Fredy fundoplication at [facility] Dr. Gale Bray. Fredy fundoplication at [facility] Dr. Gale Bray. TRIGGER FINGER RELEASE      left hand  2005    YAG CAPSULOTOMY - OU - BOTH EYES Bilateral 09/2021    done in [facility] Neg/Pos Details   Constitutional Negative Fatigue, fever and weight loss. ENMT Negative Drooling. Eyes Negative Blurred vision and vision changes. Respiratory Negative Dyspnea and wheezing. Cardio Negative Chest pain, irregular heartbeat/palpitations and syncope. GI Negative Abdominal pain and diarrhea. Endocrine Negative Cold intolerance and heat intolerance. Neuro Negative Tremors. Psych Negative Anxiety and depression. Integumentary Negative Frequent skin infections, pigment change and rash. Hema/Lymph Negative Easy bleeding and easy bruising. PHYSICAL EXAM    There were no vitals taken for this visit. Constitutional Normal Overall appearance - Normal.   Psychiatric Normal Orientation - Oriented to time, place, person & situation. Appropriate mood and affect. Neck Exam Normal Inspection - Normal. Palpation - Normal. Parotid gland - Normal. Thyroid gland - Normal.   Eyes Normal Conjunctiva - Right: Normal, Left: Normal. Pupil - Right: Normal, Left: Normal. Fundus - Right: Normal, Left: Normal.   Neurological Normal Memory - Normal. Cranial nerves - Cranial nerves II through XII grossly intact.    Head/Face Normal Facial features - Normal. Eyebrows - Normal. Skull - Normal.        Nasopharynx Normal External nose - Normal. Lips/teeth/gums - Normal. Tonsils - Normal. Oropharynx - Normal.   Ears Normal Inspection - Right: Normal, Left: Normal. Canal - Right: Normal, Left: Normal. TM - Right: Normal, Left: Normal. Skin Normal Inspection - Normal.        Lymph Detail Normal Submental. Submandibular. Anterior cervical. Posterior cervical. Supraclavicular. Nose/Mouth/Throat Normal External nose - Normal. Lips/teeth/gums - Normal. Tonsils - Normal. Oropharynx - Normal.  Improved palate. No areas of fluctuance or drainage. Overall appears better. Still slightly tender to palpation. Nose/Mouth/Throat Normal Nares - Right: Normal Left: Normal. Septum -Normal  Turbinates - Right: Normal, Left: Normal.       Current Outpatient Medications:     clindamycin 300 MG Oral Cap, Take 1 capsule (300 mg total) by mouth every 8 (eight) hours for 10 days. , Disp: 30 capsule, Rfl: 0    albuterol 108 (90 Base) MCG/ACT Inhalation Aero Soln, inhale 2 puff by inhalation route  every 4 - 6 hours as needed, Disp: 1 each, Rfl: 0    glycopyrrolate 1 MG Oral Tab, Take 1 tablet (1 mg total) by mouth 3 (three) times daily. , Disp: 90 tablet, Rfl: 1    omeprazole 20 MG Oral Capsule Delayed Release, Take 1 capsule (20 mg total) by mouth every morning., Disp: 30 capsule, Rfl: 3    hydroxychloroquine 200 MG Oral Tab, Take 1 tablet (200 mg total) by mouth daily. , Disp: 90 tablet, Rfl: 1    montelukast 10 MG Oral Tab, Take 1 tablet (10 mg total) by mouth nightly., Disp: 90 tablet, Rfl: 3    leflunomide 20 MG Oral Tab, Take 1 tablet (20 mg total) by mouth daily. , Disp: , Rfl:     LORATADINE 10 MG Oral Tab, TAKE 1 TABLET BY MOUTH EVERY DAY, Disp: 90 tablet, Rfl: 1    carvedilol 25 MG Oral Tab, Take 1 tablet (25 mg total) by mouth 2 (two) times daily with meals. , Disp: 180 tablet, Rfl: 3    Glucose Blood (ACCU-CHEK GUIDE) In Vitro Strip, Check blood sugar twice a day (fasting and before dinner). DX: E11.42, NIDDM, Disp: 200 strip, Rfl: 1    BIOTIN OR, Take by mouth., Disp: , Rfl:     glipiZIDE 5 MG Oral Tab, Take 1 tablet (5 mg total) by mouth 2 (two) times daily before meals. , Disp: 180 tablet, Rfl: 0    cyclobenzaprine 10 MG Oral Tab, Take 1 tablet (10 mg total) by mouth nightly. Patient is taking one tablet by mouth nightly as needed for muscle spasms. , Disp: 90 tablet, Rfl: 1    valsartan 80 MG Oral Tab, Take 1 tablet (80 mg total) by mouth daily. , Disp: 90 tablet, Rfl: 3    spironolactone 25 MG Oral Tab, Take 2 tablets (50 mg total) by mouth daily. , Disp: 180 tablet, Rfl: 1    fluticasone-salmeterol (WIXELA INHUB) 250-50 MCG/ACT Inhalation Aerosol Powder, Breath Activated, Inhale 1 puff into the lungs every 12 (twelve) hours. , Disp: 1 each, Rfl: 5    albuterol 108 (90 Base) MCG/ACT Inhalation Aero Soln, inhale 2 puff by inhalation route  every 4 - 6 hours as needed, Disp: 1 each, Rfl: 5    traMADol 50 MG Oral Tab, Take 1 tablet (50 mg total) by mouth every 12 (twelve) hours as needed for Pain., Disp: 20 tablet, Rfl: 0    atorvastatin 80 MG Oral Tab, Take 1 tablet (80 mg total) by mouth daily. , Disp: 990 tablet, Rfl: 1    Accu-Chek FastClix Lancets Does not apply Misc, Check sugar once daily as directed, Disp: 100 each, Rfl: 2    Blood Glucose Monitoring Suppl (ONETOUCH VERIO) w/Device Does not apply Kit, 1 each daily. Test 1x daily, Disp: 1 kit, Rfl: 0    Glucose Blood (ONETOUCH VERIO) In Vitro Strip, Test 1x daily, Disp: 100 strip, Rfl: 1    OneTouch Delica Lancets 55U Does not apply Misc, 4 x daily, Disp: 100 each, Rfl: 1    Blood Glucose Monitoring Suppl (BLOOD GLUCOSE SYSTEM GILLES) Does not apply Kit, DX E 11.9. Checks every morning., Disp: 1 kit, Rfl: 0    Lancets Does not apply Misc, DX E 11.9. Checks every morning., Disp: 50 each, Rfl: 11    Blood Glucose Monitoring Suppl w/Device Does not apply Kit, DX E 11.9. Checks every morning., Disp: 1 kit, Rfl: 0    Blood Gluc Meter Disp-Strips Does not apply Device, DX E 11.9.   Checks every morning., Disp: 100 each, Rfl: 11    acetaminophen 500 MG Oral Tab, Take 1 tablet (500 mg total) by mouth every 6 (six) hours as needed for Pain., Disp: , Rfl:     clonazePAM 0.5 MG Oral Tab, Take 1 tablet (0.5 mg total) by mouth 2 (two) times daily as needed for Anxiety. Patient is taking one tablet by mouth every night at bedtime as needed for anxiety. , Disp: , Rfl:     aspirin 81 MG Oral Tab EC, Take 325 mg by mouth daily. , Disp: , Rfl:     predniSONE 5 MG Oral Tab, Take 1 tablet (5 mg total) by mouth daily. (Patient not taking: Reported on 9/29/2023), Disp: 90 tablet, Rfl: 1    methylPREDNISolone 4 MG Oral Tablet Therapy Pack, Take as directed on package. (Patient not taking: Reported on 9/29/2023), Disp: 1 each, Rfl: 0    predniSONE 10 MG Oral Tab, Take 30 mg for 2 days then 20 mg for 2 days then 10 mg for one day and after that continue with your regular (Patient not taking: Reported on 9/29/2023), Disp: 11 tablet, Rfl: 0  ASSESSMENT AND PLAN    1. Abscess of mouth  Improved on clindamycin. I did discuss with her that I suspect that her dentures are the probable cause of this injury and infection therefore have asked her to simply stop the use of her upper dentures at all cost.  Let this area rest and heal over the next several weeks and she will most likely need to have her dentures remade or realigned. Resume clindamycin for 10 days return to see me in 2 weeks. This note was prepared using [employer] [place] voice recognition dictation software. As a result errors may occur. When identified these errors have been corrected. While every attempt is made to correct errors during dictation discrepancies may still exist    Sharath De La Paz MD    9/29/2023    4:53 PM

## 2023-10-02 RX ORDER — GLYCOPYRROLATE 1 MG/1
1 TABLET ORAL 3 TIMES DAILY
Qty: 90 TABLET | Refills: 1 | Status: SHIPPED | OUTPATIENT
Start: 2023-10-02

## 2023-10-03 ENCOUNTER — TELEPHONE (OUTPATIENT)
Dept: RHEUMATOLOGY | Facility: CLINIC | Age: 83
End: 2023-10-03

## 2023-10-03 NOTE — TELEPHONE ENCOUNTER
Fax received from [employer]     PA Approved    Prior authorization for: [employer]    Medication form: in office injections    Date received: 10/02/23    Approval #: [id number]    Approved dates: 01/01/23 to 12/31/2023    We're pleased to inform you that this request has been automatically approved and extended for the next calendar year.  There is no need to submit  new PA request.

## 2023-10-12 ENCOUNTER — APPOINTMENT (OUTPATIENT)
Dept: HEMATOLOGY/ONCOLOGY | Facility: HOSPITAL | Age: 83
End: 2023-10-12
Attending: INTERNAL MEDICINE
Payer: MEDICARE

## 2023-10-16 RX ORDER — ATORVASTATIN CALCIUM 80 MG/1
80 TABLET, FILM COATED ORAL DAILY
Qty: 90 TABLET | Refills: 3 | Status: ON HOLD | OUTPATIENT
Start: 2023-10-16 | End: 2023-12-15

## 2023-10-16 NOTE — TELEPHONE ENCOUNTER
Refill passed per [facility] protocol.   Requested Prescriptions   Pending Prescriptions Disp Refills    ATORVASTATIN 80 MG Oral Tab [Pharmacy Med Name: ATORVASTATIN 80 MG TABLET] 90 tablet 21     Sig: TAKE 1 TABLET BY MOUTH EVERY DAY       Cholesterol Medication Protocol Passed - 10/15/2023  1:06 AM        Passed - ALT in past 12 months        Passed - LDL in past 12 months        Passed - Last ALT < 80     Lab Results   Component Value Date    ALT 18 09/14/2023             Passed - Last LDL < 130     Lab Results   Component Value Date    LDL 99 03/21/2023             Passed - In person appointment or virtual visit in the past 12 mos or appointment in next 3 mos     Recent Outpatient Visits              2 weeks ago Abscess of mouth    [facility] Sharath Ferreira MD    Office Visit    3 weeks ago Rectal bleeding    [facility] Mikey Garcia MD    Office Visit    3 weeks ago Chronic obstructive pulmonary disease, unspecified COPD type (HCC)    [facility] Chapin Parker MD    Office Visit    1 month ago Abscess of mouth    [facility] Sharath Ferreira MD    Office Visit    1 month ago Iron deficiency anemia due to chronic blood loss    Bridget Chen [facility] - Infusion    Office Visit          Future Appointments         Provider Department Appt Notes    In 2 days Vic Thakkar APRN [facility] 6 mo fu    In 4 days EM CC LAB1 Bridget LOPEZ Chen [facility] - Infusion CBC-Iron studies--KJ    In 4 days Eric Mohamud MD [facility] Hematology Oncology Follow UP    In 4 days Sharath Ferreira MD [facility]     In 1 week EM CC INFRN 6 Bridget LOPEZ Chen [facility] - Infusion VENOFER [id number] 1/3    In 1 week EM CC INFRN 1 Bridget LOPEZ  [facility] - Infusion VENOFER [id number] 2/3    In 2 weeks EM CC INFRN 3 Bridget LOPEZ [facility] - Infusion VENOFER [id number] 3/3    In 1 month Heike Sorto MD [facility], [facility]     In 4 months Heike Sorto MD [facility], [facility]     In 9 months Quinton Tesfaye MD [facility], [facility] 1 YEAR F/U    In 11 months [facility] yearly

## 2023-10-18 ENCOUNTER — OFFICE VISIT (OUTPATIENT)
Dept: ENDOCRINOLOGY CLINIC | Facility: CLINIC | Age: 83
End: 2023-10-18

## 2023-10-18 VITALS
BODY MASS INDEX: 27 KG/M2 | DIASTOLIC BLOOD PRESSURE: 83 MMHG | SYSTOLIC BLOOD PRESSURE: 145 MMHG | WEIGHT: 145 LBS | HEART RATE: 92 BPM

## 2023-10-18 DIAGNOSIS — E11.42 TYPE 2 DIABETES MELLITUS WITH DIABETIC POLYNEUROPATHY, WITHOUT LONG-TERM CURRENT USE OF INSULIN (HCC): Primary | ICD-10-CM

## 2023-10-18 LAB
CARTRIDGE LOT#: ABNORMAL NUMERIC
GLUCOSE BLOOD: 295
HEMOGLOBIN A1C: 7 % (ref 4.3–5.6)
TEST STRIP LOT #: NORMAL NUMERIC

## 2023-10-18 PROCEDURE — 82947 ASSAY GLUCOSE BLOOD QUANT: CPT | Performed by: NURSE PRACTITIONER

## 2023-10-18 PROCEDURE — 83036 HEMOGLOBIN GLYCOSYLATED A1C: CPT | Performed by: NURSE PRACTITIONER

## 2023-10-18 PROCEDURE — 3077F SYST BP >= 140 MM HG: CPT | Performed by: NURSE PRACTITIONER

## 2023-10-18 PROCEDURE — 99213 OFFICE O/P EST LOW 20 MIN: CPT | Performed by: NURSE PRACTITIONER

## 2023-10-18 PROCEDURE — 3079F DIAST BP 80-89 MM HG: CPT | Performed by: NURSE PRACTITIONER

## 2023-10-18 RX ORDER — HEPARIN SODIUM (PORCINE) LOCK FLUSH IV SOLN 100 UNIT/ML 100 UNIT/ML
5 SOLUTION INTRAVENOUS ONCE
Status: CANCELLED | OUTPATIENT
Start: 2023-10-18

## 2023-10-18 RX ORDER — SODIUM CHLORIDE 9 MG/ML
10 INJECTION INTRAVENOUS ONCE
Status: CANCELLED | OUTPATIENT
Start: 2023-10-18

## 2023-10-18 NOTE — PATIENT INSTRUCTIONS
A1C: 7.0% today --> decrease from 8.1% on 6/29/2023  Blood glucose: 295 in clinic today    Medications:   - continue with Glipizide 10mg with breakfast           2.5mg with dinner     - continue to follow a low carb diet   - continue to stay active as currently doing     A1C goal:  <7.5%    Blood sugar testing:  Test your blood sugar 1 time daily   Recommended times to test: alternate with before breakfast (fasting) or before dinner     Blood sugar targets:  Before breakfast:  (preferably < 110)  Before meals OR 2 hours after meals: <180 (preferably <150)     Call for persistent blood sugars < 75 or > 200

## 2023-10-19 ENCOUNTER — TELEPHONE (OUTPATIENT)
Dept: INTERNAL MEDICINE CLINIC | Facility: CLINIC | Age: 83
End: 2023-10-19

## 2023-10-19 DIAGNOSIS — K12.2 ABSCESS OF SUBMANDIBULAR REGION: Primary | ICD-10-CM

## 2023-10-19 NOTE — TELEPHONE ENCOUNTER
Pts daughter rut on [address]ts pt has appt with  tomorrow on 10/20 at 2pm. Drs office called to let pts daughter know pt has no more referrals.  Please advise

## 2023-10-20 ENCOUNTER — OFFICE VISIT (OUTPATIENT)
Dept: OTOLARYNGOLOGY | Facility: CLINIC | Age: 83
End: 2023-10-20

## 2023-10-20 ENCOUNTER — NURSE ONLY (OUTPATIENT)
Dept: HEMATOLOGY/ONCOLOGY | Facility: HOSPITAL | Age: 83
End: 2023-10-20
Attending: INTERNAL MEDICINE
Payer: MEDICARE

## 2023-10-20 VITALS
DIASTOLIC BLOOD PRESSURE: 67 MMHG | HEIGHT: 61 IN | HEART RATE: 87 BPM | TEMPERATURE: 98 F | BODY MASS INDEX: 27.3 KG/M2 | SYSTOLIC BLOOD PRESSURE: 167 MMHG | RESPIRATION RATE: 16 BRPM | WEIGHT: 144.63 LBS | OXYGEN SATURATION: 97 %

## 2023-10-20 DIAGNOSIS — K90.9 MALABSORPTION OF IRON: ICD-10-CM

## 2023-10-20 DIAGNOSIS — K12.2 ABSCESS OF MOUTH: Primary | ICD-10-CM

## 2023-10-20 DIAGNOSIS — Z45.2 ENCOUNTER FOR CARE RELATED TO VASCULAR ACCESS PORT: Primary | ICD-10-CM

## 2023-10-20 DIAGNOSIS — D53.9 MACROCYTIC ANEMIA: ICD-10-CM

## 2023-10-20 DIAGNOSIS — D50.0 IRON DEFICIENCY ANEMIA DUE TO CHRONIC BLOOD LOSS: ICD-10-CM

## 2023-10-20 DIAGNOSIS — D53.9 MACROCYTIC ANEMIA: Primary | ICD-10-CM

## 2023-10-20 DIAGNOSIS — K55.20 AVM (ARTERIOVENOUS MALFORMATION) OF COLON: ICD-10-CM

## 2023-10-20 DIAGNOSIS — D50.0 IRON DEFICIENCY ANEMIA DUE TO CHRONIC BLOOD LOSS: Primary | ICD-10-CM

## 2023-10-20 DIAGNOSIS — Z86.2 HISTORY OF IRON DEFICIENCY ANEMIA: ICD-10-CM

## 2023-10-20 LAB
ALBUMIN SERPL-MCNC: 3 G/DL (ref 3.4–5)
ALBUMIN/GLOB SERPL: 0.7 {RATIO} (ref 1–2)
ALP LIVER SERPL-CCNC: 97 U/L
ALT SERPL-CCNC: 20 U/L
ANION GAP SERPL CALC-SCNC: 7 MMOL/L (ref 0–18)
AST SERPL-CCNC: 21 U/L (ref 15–37)
BASOPHILS # BLD AUTO: 0.05 X10(3) UL (ref 0–0.2)
BASOPHILS NFR BLD AUTO: 0.5 %
BILIRUB SERPL-MCNC: 0.1 MG/DL (ref 0.1–2)
BUN BLD-MCNC: 15 MG/DL (ref 7–18)
BUN/CREAT SERPL: 14.6 (ref 10–20)
CALCIUM BLD-MCNC: 8.4 MG/DL (ref 8.5–10.1)
CHLORIDE SERPL-SCNC: 110 MMOL/L (ref 98–112)
CO2 SERPL-SCNC: 22 MMOL/L (ref 21–32)
CREAT BLD-MCNC: 1.03 MG/DL
DEPRECATED HBV CORE AB SER IA-ACNC: 300 NG/ML
DEPRECATED RDW RBC AUTO: 58.1 FL (ref 35.1–46.3)
EGFRCR SERPLBLD CKD-EPI 2021: 54 ML/MIN/1.73M2 (ref 60–?)
EOSINOPHIL # BLD AUTO: 0.15 X10(3) UL (ref 0–0.7)
EOSINOPHIL NFR BLD AUTO: 1.5 %
ERYTHROCYTE [DISTWIDTH] IN BLOOD BY AUTOMATED COUNT: 15.8 % (ref 11–15)
FOLATE SERPL-MCNC: 14.7 NG/ML (ref 8.7–?)
GLOBULIN PLAS-MCNC: 4.6 G/DL (ref 2.8–4.4)
GLUCOSE BLD-MCNC: 231 MG/DL (ref 70–99)
HAPTOGLOB SERPL-MCNC: 159 MG/DL (ref 30–200)
HCT VFR BLD AUTO: 29.4 %
HGB BLD-MCNC: 9.1 G/DL
HGB RETIC QN AUTO: 32.2 PG (ref 28.2–36.6)
IMM GRANULOCYTES # BLD AUTO: 0.16 X10(3) UL (ref 0–1)
IMM GRANULOCYTES NFR BLD: 1.6 %
IMM RETICS NFR: 0.36 RATIO (ref 0.1–0.3)
IRON SATN MFR SERPL: 18 %
IRON SERPL-MCNC: 51 UG/DL
LDH SERPL L TO P-CCNC: 238 U/L
LYMPHOCYTES # BLD AUTO: 1.13 X10(3) UL (ref 1–4)
LYMPHOCYTES NFR BLD AUTO: 11.1 %
MCH RBC QN AUTO: 31.2 PG (ref 26–34)
MCHC RBC AUTO-ENTMCNC: 31 G/DL (ref 31–37)
MCV RBC AUTO: 100.7 FL
MONOCYTES # BLD AUTO: 0.7 X10(3) UL (ref 0.1–1)
MONOCYTES NFR BLD AUTO: 6.9 %
NEUTROPHILS # BLD AUTO: 8.01 X10 (3) UL (ref 1.5–7.7)
NEUTROPHILS # BLD AUTO: 8.01 X10(3) UL (ref 1.5–7.7)
NEUTROPHILS NFR BLD AUTO: 78.4 %
OSMOLALITY SERPL CALC.SUM OF ELEC: 296 MOSM/KG (ref 275–295)
PLATELET # BLD AUTO: 309 10(3)UL (ref 150–450)
POTASSIUM SERPL-SCNC: 5.2 MMOL/L (ref 3.5–5.1)
PROT SERPL-MCNC: 7.6 G/DL (ref 6.4–8.2)
RBC # BLD AUTO: 2.92 X10(6)UL
RETICS # AUTO: 140.4 X10(3) UL (ref 22.5–147.5)
RETICS/RBC NFR AUTO: 4.7 %
SODIUM SERPL-SCNC: 139 MMOL/L (ref 136–145)
TIBC SERPL-MCNC: 282 UG/DL (ref 240–450)
TRANSFERRIN SERPL-MCNC: 189 MG/DL (ref 200–360)
TSI SER-ACNC: 0.62 MIU/ML (ref 0.36–3.74)
VIT B12 SERPL-MCNC: 748 PG/ML (ref 193–986)
WBC # BLD AUTO: 10.2 X10(3) UL (ref 4–11)

## 2023-10-20 PROCEDURE — 1160F RVW MEDS BY RX/DR IN RCRD: CPT | Performed by: OTOLARYNGOLOGY

## 2023-10-20 PROCEDURE — 80053 COMPREHEN METABOLIC PANEL: CPT

## 2023-10-20 PROCEDURE — 99214 OFFICE O/P EST MOD 30 MIN: CPT | Performed by: INTERNAL MEDICINE

## 2023-10-20 PROCEDURE — 85025 COMPLETE CBC W/AUTO DIFF WBC: CPT

## 2023-10-20 PROCEDURE — 36591 DRAW BLOOD OFF VENOUS DEVICE: CPT

## 2023-10-20 PROCEDURE — 99213 OFFICE O/P EST LOW 20 MIN: CPT | Performed by: OTOLARYNGOLOGY

## 2023-10-20 PROCEDURE — 83010 ASSAY OF HAPTOGLOBIN QUANT: CPT

## 2023-10-20 PROCEDURE — 85045 AUTOMATED RETICULOCYTE COUNT: CPT

## 2023-10-20 PROCEDURE — 83540 ASSAY OF IRON: CPT

## 2023-10-20 PROCEDURE — 82728 ASSAY OF FERRITIN: CPT

## 2023-10-20 PROCEDURE — 82746 ASSAY OF FOLIC ACID SERUM: CPT

## 2023-10-20 PROCEDURE — 82607 VITAMIN B-12: CPT

## 2023-10-20 PROCEDURE — 84443 ASSAY THYROID STIM HORMONE: CPT

## 2023-10-20 PROCEDURE — 83615 LACTATE (LD) (LDH) ENZYME: CPT

## 2023-10-20 PROCEDURE — 1159F MED LIST DOCD IN RCRD: CPT | Performed by: OTOLARYNGOLOGY

## 2023-10-20 PROCEDURE — 84466 ASSAY OF TRANSFERRIN: CPT

## 2023-10-20 RX ORDER — HEPARIN SODIUM (PORCINE) LOCK FLUSH IV SOLN 100 UNIT/ML 100 UNIT/ML
5 SOLUTION INTRAVENOUS ONCE
Status: CANCELLED | OUTPATIENT
Start: 2023-10-20

## 2023-10-20 RX ORDER — HEPARIN SODIUM (PORCINE) LOCK FLUSH IV SOLN 100 UNIT/ML 100 UNIT/ML
5 SOLUTION INTRAVENOUS ONCE
Status: COMPLETED | OUTPATIENT
Start: 2023-10-20 | End: 2023-10-20

## 2023-10-20 RX ORDER — HEPARIN SODIUM (PORCINE) LOCK FLUSH IV SOLN 100 UNIT/ML 100 UNIT/ML
5 SOLUTION INTRAVENOUS ONCE
OUTPATIENT
Start: 2023-10-20

## 2023-10-20 RX ORDER — SODIUM CHLORIDE 9 MG/ML
10 INJECTION INTRAVENOUS ONCE
OUTPATIENT
Start: 2023-10-20

## 2023-10-20 RX ADMIN — HEPARIN SODIUM (PORCINE) LOCK FLUSH IV SOLN 100 UNIT/ML 500 UNITS: 100 SOLUTION INTRAVENOUS at 12:05:00

## 2023-10-20 RX ADMIN — HEPARIN SODIUM (PORCINE) LOCK FLUSH IV SOLN 100 UNIT/ML 500 UNITS: 100 SOLUTION INTRAVENOUS at 13:41:00

## 2023-10-20 NOTE — PROGRESS NOTES
Neida Donato is a 79year old female. Patient presents with: Follow - Up: F/up abscess of mouth  Pt reports abscess has improved and is slightly tender      HISTORY OF PRESENT ILLNESS  Presents with 2 complaints. Has been having ringing for a few years on and off states that it seems to be getting more constant more recently. Denies any hearing loss. Audiogram demonstrates mild downsloping to moderate high-frequency hearing loss at the highest frequencies. Normal tympanograms this speech discrimination scores around 90% bilaterally. Also complains of rhinitis with postnasal dri and associated cough and throat clearing as well as rhinorrhea with meals. No nasal mucopurulence. Cough is nonproductive. 5/11/23 improved cough since I last saw her. Started on on oral inhaler and a powdered form by her pulmonologist.  Also use Claritin Astelin and montelukast stopped the spray due to nosebleed. Overall throat pain is gone but still having gustatory rhinitis and some postnasal discharge. 9/1/23 nasal spray started causing her some nosebleeds. Continue use ofd Claritin montelukast and we started her on glycopyrrolate. She has been instructed to avoid use of the spray anymore. Still having some pain and discomfort on her palate. Does state that she moves her dentures into place and usually will rub out the glue at the end of the day. States that the last time I saw her in May was having some discomfort but things actually worsened. 9/15/23 last visit noted to have a palatal abscess. Thought to be perhaps due to use of a ill fitting denture. Feels much better with no pain or discomfort in her mouth at the moment. She has still been using her dentures to eat. She has been on clindamycin for 10 days so far.     9/29/23 doing okay. Palatal abscess. Appears to be related to her use of dentures and glue to hold the denture in place so that she can eat.   She has been on clindamycin for second course. Previous culture taken demonstrates oral edd. No other significant signs, symptoms or complaints     10/20/23 for the last 2 weeks she has kept her dentures out and she has been on antibiotics the whole time. Doing very well no complaints or concerns feels that things have improved dramatically. No other signs, symptoms or complaint      Social History    Socioeconomic History      Marital status:     Tobacco Use      Smoking status: Former        Packs/day: .25        Types: Cigarettes        Quit date:         Years since quittin.8      Smokeless tobacco: Never      Tobacco comments: [id number]    Vaping Use      Vaping Use: Never used    Substance and Sexual Activity      Alcohol use: Never      Drug use: Never      Family History   Problem Relation Age of Onset    Breast Cancer Self 77    Heart Surgery Mother         Leaky valve at 37 y/o.      Diabetes Maternal Grandmother     Diabetes Paternal Aunt     Breast Cancer Niece         before 48    Macular degeneration Neg     Glaucoma Neg        Past Medical History:   Diagnosis Date    Anxiety state     Cancer [facility])     breast cancer 2018    Chronic obstructive pulmonary disease, unspecified ([facility] 2023    Diabetes ([facility]     Essential hypertension     Exposure to medical diagnostic radiation     High blood pressure     High cholesterol     History of blood transfusion 2022    low hemoglobin    Osteoarthritis     PONV (postoperative nausea and vomiting)     Pulmonary emphysema (HCC)     Rheumatoid arthritis ([address]        Past Surgical History:   Procedure Laterality Date    CATARACT EXTRACTION W/  INTRAOCULAR LENS IMPLANT Bilateral 2017     in [address] [place] [address]     COLONOSCOPY      COLONOSCOPY N/A 2022    Procedure: COLONOSCOPY;  Surgeon: Collin Tello MD;  Location: [address] [place] [address] ENDOSCOPY    COLONOSCOPY N/A 6/15/2023    Procedure: COLONOSCOPY;  Surgeon: Collin Tello MD;  Location: [address] [place] [address] ENDOSCOPY    CORRECT [facility] right foot  [address], no abnormal Paps, due to heavy bleeding with fibroids. Not sure if it had bilateral salpingo-oophorectomy. IR ANEURYSM REPAIRM  2010    Computer assisted volumetric craniofomy with clipping of anterior cerebral artery. LUMPECTOMY RIGHT      RADIATION RIGHT      ROTATOR CUFF REPAIR      1993    TOTAL KNEE REPLACEMENT Right 2010    TOTAL KNEE REPLACEMENT Left 07/02/2015    TRANSORAL INCISIONLESS FUNDOPLICATION - INTERNAL  20/46/9825 Fredy fundoplication at [facility] Dr. Bettina Bo. Fredy fundoplication at [facility] Dr. Bettina Bo. TRIGGER FINGER RELEASE      left hand  2005    YAG CAPSULOTOMY - OU - BOTH EYES Bilateral 09/2021    done in [facility] Neg/Pos Details   Constitutional Negative Fatigue, fever and weight loss. ENMT Negative Drooling. Eyes Negative Blurred vision and vision changes. Respiratory Negative Dyspnea and wheezing. Cardio Negative Chest pain, irregular heartbeat/palpitations and syncope. GI Negative Abdominal pain and diarrhea. Endocrine Negative Cold intolerance and heat intolerance. Neuro Negative Tremors. Psych Negative Anxiety and depression. Integumentary Negative Frequent skin infections, pigment change and rash. Hema/Lymph Negative Easy bleeding and easy bruising. PHYSICAL EXAM    There were no vitals taken for this visit. Constitutional Normal Overall appearance - Normal.   Psychiatric Normal Orientation - Oriented to time, place, person & situation. Appropriate mood and affect. Neck Exam Normal Inspection - Normal. Palpation - Normal. Parotid gland - Normal. Thyroid gland - Normal.   Eyes Normal Conjunctiva - Right: Normal, Left: Normal. Pupil - Right: Normal, Left: Normal. Fundus - Right: Normal, Left: Normal.   Neurological Normal Memory - Normal. Cranial nerves - Cranial nerves II through XII grossly intact.    Head/Face Normal Facial features - Normal. Eyebrows - Normal. Skull - Normal.        Nasopharynx Normal External nose - Normal. Lips/teeth/gums - Normal. Tonsils - Normal. Oropharynx - Normal.   Ears Normal Inspection - Right: Normal, Left: Normal. Canal - Right: Normal, Left: Normal. TM - Right: Normal, Left: Normal.   Skin Normal Inspection - Normal.        Lymph Detail Normal Submental. Submandibular. Anterior cervical. Posterior cervical. Supraclavicular. Nose/Mouth/Throat Normal External nose - Normal. Lips/teeth/gums - Normal. Tonsils - Normal. Oropharynx - Normal.  Resolved palatal impaction   Nose/Mouth/Throat Normal Nares - Right: Normal Left: Normal. Septum -Normal  Turbinates - Right: Normal, Left: Normal.       Current Outpatient Medications:     atorvastatin 80 MG Oral Tab, Take 1 tablet (80 mg total) by mouth daily. , Disp: 90 tablet, Rfl: 3    glycopyrrolate 1 MG Oral Tab, Take 1 tablet (1 mg total) by mouth 3 (three) times daily. , Disp: 90 tablet, Rfl: 1    albuterol 108 (90 Base) MCG/ACT Inhalation Aero Soln, inhale 2 puff by inhalation route  every 4 - 6 hours as needed, Disp: 1 each, Rfl: 0    omeprazole 20 MG Oral Capsule Delayed Release, Take 1 capsule (20 mg total) by mouth every morning., Disp: 30 capsule, Rfl: 3    hydroxychloroquine 200 MG Oral Tab, Take 1 tablet (200 mg total) by mouth daily. , Disp: 90 tablet, Rfl: 1    predniSONE 5 MG Oral Tab, Take 1 tablet (5 mg total) by mouth daily. , Disp: 90 tablet, Rfl: 1    montelukast 10 MG Oral Tab, Take 1 tablet (10 mg total) by mouth nightly., Disp: 90 tablet, Rfl: 3    leflunomide 20 MG Oral Tab, Take 1 tablet (20 mg total) by mouth daily. , Disp: , Rfl:     LORATADINE 10 MG Oral Tab, TAKE 1 TABLET BY MOUTH EVERY DAY, Disp: 90 tablet, Rfl: 1    carvedilol 25 MG Oral Tab, Take 1 tablet (25 mg total) by mouth 2 (two) times daily with meals. , Disp: 180 tablet, Rfl: 3    Glucose Blood (ACCU-CHEK GUIDE) In Vitro Strip, Check blood sugar twice a day (fasting and before dinner). DX: E11.42, NIDDM, Disp: 200 strip, Rfl: 1    BIOTIN OR, Take by mouth., Disp: , Rfl:     glipiZIDE 5 MG Oral Tab, Take 1 tablet (5 mg total) by mouth 2 (two) times daily before meals. , Disp: 180 tablet, Rfl: 0    cyclobenzaprine 10 MG Oral Tab, Take 1 tablet (10 mg total) by mouth nightly. Patient is taking one tablet by mouth nightly as needed for muscle spasms. , Disp: 90 tablet, Rfl: 1    valsartan 80 MG Oral Tab, Take 1 tablet (80 mg total) by mouth daily. , Disp: 90 tablet, Rfl: 3    spironolactone 25 MG Oral Tab, Take 2 tablets (50 mg total) by mouth daily. , Disp: 180 tablet, Rfl: 1    methylPREDNISolone 4 MG Oral Tablet Therapy Pack, Take as directed on package. , Disp: 1 each, Rfl: 0    fluticasone-salmeterol (Lequita [place]) 250-50 MCG/ACT Inhalation Aerosol Powder, Breath Activated, Inhale 1 puff into the lungs every 12 (twelve) hours. , Disp: 1 each, Rfl: 5    albuterol 108 (90 Base) MCG/ACT Inhalation Aero Soln, inhale 2 puff by inhalation route  every 4 - 6 hours as needed, Disp: 1 each, Rfl: 5    traMADol 50 MG Oral Tab, Take 1 tablet (50 mg total) by mouth every 12 (twelve) hours as needed for Pain., Disp: 20 tablet, Rfl: 0    predniSONE 10 MG Oral Tab, Take 30 mg for 2 days then 20 mg for 2 days then 10 mg for one day and after that continue with your regular, Disp: 11 tablet, Rfl: 0    Accu-Chek FastClix Lancets Does not apply Misc, Check sugar once daily as directed, Disp: 100 each, Rfl: 2    Blood Glucose Monitoring Suppl (ONETOUCH VERIO) w/Device Does not apply Kit, 1 each daily. Test 1x daily, Disp: 1 kit, Rfl: 0    Glucose Blood (ONETOUCH VERIO) In Vitro Strip, Test 1x daily, Disp: 100 strip, Rfl: 1    OneTouch Delica Lancets 77S Does not apply Misc, 4 x daily, Disp: 100 each, Rfl: 1    Blood Glucose Monitoring Suppl (BLOOD GLUCOSE SYSTEM GILLES) Does not apply Kit, DX E 11.9. Checks every morning., Disp: 1 kit, Rfl: 0    Lancets Does not apply Misc, DX E 11.9.   Checks every morning., Disp: 50 each, Rfl: 11    Blood Glucose Monitoring Suppl w/Device Does not apply Kit, DX E 11.9. Checks every morning., Disp: 1 kit, Rfl: 0    Blood Gluc Meter Disp-Strips Does not apply Device, DX E 11.9. Checks every morning., Disp: 100 each, Rfl: 11    acetaminophen 500 MG Oral Tab, Take 1 tablet (500 mg total) by mouth every 6 (six) hours as needed for Pain., Disp: , Rfl:     clonazePAM 0.5 MG Oral Tab, Take 1 tablet (0.5 mg total) by mouth 2 (two) times daily as needed for Anxiety. Patient is taking one tablet by mouth every night at bedtime as needed for anxiety. , Disp: , Rfl:     aspirin 81 MG Oral Tab EC, Take 325 mg by mouth daily. , Disp: , Rfl:   ASSESSMENT AND PLAN    1. Abscess of mouth  Complete resolution of her infection of the palate. I do suspect that this is due to her use of dentures and glue. I did asked her to meet with her dentist to see if she can get a different denture in the future return to see me as needed. This note was prepared using [employer] [facility] [employer] voice recognition dictation software. As a result errors may occur. When identified these errors have been corrected. While every attempt is made to correct errors during dictation discrepancies may still exist    Sharath Mcfarland MD    10/20/2023    3:07 PM

## 2023-10-23 ENCOUNTER — APPOINTMENT (OUTPATIENT)
Dept: HEMATOLOGY/ONCOLOGY | Facility: HOSPITAL | Age: 83
End: 2023-10-23
Attending: INTERNAL MEDICINE
Payer: MEDICARE

## 2023-10-27 ENCOUNTER — APPOINTMENT (OUTPATIENT)
Dept: HEMATOLOGY/ONCOLOGY | Facility: HOSPITAL | Age: 83
End: 2023-10-27
Attending: INTERNAL MEDICINE
Payer: MEDICARE

## 2023-10-27 RX ORDER — GLIPIZIDE 5 MG/1
5 TABLET ORAL
Qty: 180 TABLET | Refills: 0 | Status: SHIPPED | OUTPATIENT
Start: 2023-10-27

## 2023-11-02 ENCOUNTER — APPOINTMENT (OUTPATIENT)
Dept: HEMATOLOGY/ONCOLOGY | Facility: HOSPITAL | Age: 83
End: 2023-11-02
Attending: INTERNAL MEDICINE
Payer: MEDICARE

## 2023-11-08 ENCOUNTER — TELEPHONE (OUTPATIENT)
Facility: CLINIC | Age: 83
End: 2023-11-08

## 2023-11-08 DIAGNOSIS — Q27.33 CONGENITAL GASTROINTESTINAL VESSEL ANOMALY: Primary | ICD-10-CM

## 2023-11-08 RX ORDER — DULOXETIN HYDROCHLORIDE 20 MG/1
CAPSULE, DELAYED RELEASE ORAL
Qty: 180 CAPSULE | Refills: 0 | OUTPATIENT
Start: 2023-11-08

## 2023-11-08 NOTE — TELEPHONE ENCOUNTER
Pt daughter is calling to reschedule 11/10 Capsule Endoscopy. There was a death in the family and she needs to go out of town.   Please call

## 2023-11-08 NOTE — TELEPHONE ENCOUNTER
Canceled per patients daughter  for:  Video Capsule Endoscopy [address]  Provider Name:  Dr Katy Frias  Date:  11/10/2023 ok'd by Kelsy Lorenz charge nurse/ENDO  Location:  [facility]  Sedation:  None  Time:  6:45 am.      Canceled in endo and epic

## 2023-11-09 NOTE — TELEPHONE ENCOUNTER
Duloxetine (cymbalta) was discontinued earlier in the year and patient is currently followed by Dr. Goodrich.     I would recommend that patient follows up with Dr. Goodrich if it is ok to refill duloxetine since she is now managing patient's symptoms.       Thank you!

## 2023-11-10 NOTE — TELEPHONE ENCOUNTER
Called pt, she states she did not request medication, pharmacy continues to send refills. She is aware to follow up with Dr. Goodrich. No further questions.

## 2023-11-17 ENCOUNTER — APPOINTMENT (OUTPATIENT)
Dept: HEMATOLOGY/ONCOLOGY | Facility: HOSPITAL | Age: 83
End: 2023-11-17
Attending: INTERNAL MEDICINE
Payer: MEDICARE

## 2023-11-17 RX ORDER — CYCLOBENZAPRINE HCL 10 MG
10 TABLET ORAL NIGHTLY PRN
Qty: 90 TABLET | Refills: 1 | Status: SHIPPED | OUTPATIENT
Start: 2023-11-17

## 2023-11-17 RX ORDER — GLIPIZIDE 5 MG/1
TABLET ORAL
Qty: 135 TABLET | Refills: 2 | OUTPATIENT
Start: 2023-11-17

## 2023-11-17 RX ORDER — OMEPRAZOLE 20 MG/1
20 CAPSULE, DELAYED RELEASE ORAL EVERY MORNING
Qty: 90 CAPSULE | Refills: 1 | Status: SHIPPED | OUTPATIENT
Start: 2023-11-17

## 2023-11-17 RX ORDER — GLIPIZIDE 5 MG/1
TABLET ORAL
Qty: 225 TABLET | Refills: 0 | Status: SHIPPED | OUTPATIENT
Start: 2023-11-17 | End: 2024-02-15

## 2023-11-17 NOTE — TELEPHONE ENCOUNTER
Please review. Protocol failed / Has no protocol. Requested Prescriptions   Pending Prescriptions Disp Refills    cyclobenzaprine 10 MG Oral Tab [Pharmacy Med Name: CYCLOBENZAPRINE 10 MG TABLET] 90 tablet 1     Sig: Take 1 tablet (10 mg total) by mouth nightly. Patient is taking one tablet by mouth nightly as needed for muscle spasms.        There is no refill protocol information for this order      Refused Prescriptions Disp Refills    glipiZIDE 5 MG Oral Tab [Pharmacy Med Name: GLIPIZIDE 5 MG TABLET] 135 tablet 2       Diabetes Medication Protocol Passed - 11/17/2023 10:59 AM        Passed - Last A1C < 7.5 and within past 6 months     Lab Results   Component Value Date    A1C 7.0 (A) 10/18/2023             Passed - In person appointment or virtual visit in the past 6 mos or appointment in next 3 mos     Recent Outpatient Visits              4 weeks ago Encounter for care related to vascular access port    [address] - Infusion    Nurse Only    4 weeks ago Macrocytic anemia    [facility] Hematology Oncology Zelalem Riley MD    Office Visit    4 weeks ago Encounter for care related to vascular access port    [address] - Infusion    Nurse Only    4 weeks ago Abscess of mouth    [facility], [address], [facility], Rishabh Crowley MD    Office Visit    1 month ago Type 2 diabetes mellitus with diabetic polyneuropathy, without long-term current use of insulin [facility])    [address], [place] [address], [address] [place] [address], [address] [place] [address]    Office Visit          Future Appointments         Provider Department Appt Notes    In 4 days EM [address] - Infusion CBC-IRON studies -[place].md    In 1 week Pino Rose MD [address], [facility]     In 4 weeks EM [address] - Infusion CBC-Iron studies-PORT.md    [facility] 2 m f/u.md    In 3 months Reynaldo Melara MD [address] [facility]     In 4 months [address], [place] F/u    In 8 months Malgorzata Geiger MD [address], [address], [place] 1 YEAR F/U    In 10 months Cozzi, Adelene Rubinstein, MD [address], [facility] yearly               Passed - [place] or GFRAA > 50     GFR Evaluation  EGFRCR: 54 , resulted on 10/20/2023          Passed - GFR in the past 12 months           Future Appointments         Provider Department Appt Notes    In 4 days EM Diallo 4 studies -PORT.md    In 1 week Reynaldo Melara MD [address], [address] [facility]     In 4 weeks  [facility]    [facility] 2 m f/u.md    In 3 months Reynaldo Melara MD [address], [address], [place]     In 4 months [address], [place] F/u    In 8 months Malgorzata Geiger MD [address], [address], [place] 1 YEAR F/U    In 5 months Cozzi, Adelene Rubinstein, MD [address], [address] [facility] yearly           Recent Outpatient Visits              4 weeks ago Encounter for care related to vascular access port    [address] [place] [address]    Nurse Only    4 weeks ago Macrocytic anemia    [facility] Hematology Oncology Yasmin Moon MD    Office Visit    4 weeks ago Encounter for care related to vascular access port    [address] [place] [address] 15    Nurse Only    4 weeks ago Abscess of mouth    [address] Zeke Young MD    Office Visit    1 month ago Type 2 diabetes mellitus with diabetic polyneuropathy, without long-term current use of insulin [facility])    [address] [facility], [address] [facility], [address] [facility], [facility]    Office Visit

## 2023-11-17 NOTE — TELEPHONE ENCOUNTER
Requested Prescriptions     Pending Prescriptions Disp Refills    OMEPRAZOLE 20 MG Oral Capsule Delayed Release [Pharmacy Med Name: OMEPRAZOLE DR 20 MG CAPSULE] 90 capsule 1     Sig: TAKE 1 CAPSULE BY MOUTH EVERY DAY IN THE MORNING     LOV  9/20/23      LR   9/5/23      AL

## 2023-11-21 ENCOUNTER — NURSE ONLY (OUTPATIENT)
Dept: HEMATOLOGY/ONCOLOGY | Facility: HOSPITAL | Age: 83
End: 2023-11-21
Attending: PHYSICIAN ASSISTANT
Payer: MEDICARE

## 2023-11-21 DIAGNOSIS — Z45.2 ENCOUNTER FOR CARE RELATED TO VASCULAR ACCESS PORT: Primary | ICD-10-CM

## 2023-11-21 DIAGNOSIS — K90.9 MALABSORPTION OF IRON: ICD-10-CM

## 2023-11-21 DIAGNOSIS — D50.0 IRON DEFICIENCY ANEMIA DUE TO CHRONIC BLOOD LOSS: ICD-10-CM

## 2023-11-21 LAB
BASOPHILS # BLD AUTO: 0.07 X10(3) UL (ref 0–0.2)
BASOPHILS NFR BLD AUTO: 0.5 %
DEPRECATED HBV CORE AB SER IA-ACNC: 84.5 NG/ML
DEPRECATED RDW RBC AUTO: 47.9 FL (ref 35.1–46.3)
EOSINOPHIL # BLD AUTO: 0.17 X10(3) UL (ref 0–0.7)
EOSINOPHIL NFR BLD AUTO: 1.2 %
ERYTHROCYTE [DISTWIDTH] IN BLOOD BY AUTOMATED COUNT: 14.4 % (ref 11–15)
HCT VFR BLD AUTO: 27.4 %
HGB BLD-MCNC: 8.6 G/DL
IMM GRANULOCYTES # BLD AUTO: 0.13 X10(3) UL (ref 0–1)
IMM GRANULOCYTES NFR BLD: 0.9 %
IRON SATN MFR SERPL: 10 %
IRON SERPL-MCNC: 27 UG/DL
LYMPHOCYTES # BLD AUTO: 0.82 X10(3) UL (ref 1–4)
LYMPHOCYTES NFR BLD AUTO: 5.8 %
MCH RBC QN AUTO: 29.1 PG (ref 26–34)
MCHC RBC AUTO-ENTMCNC: 31.4 G/DL (ref 31–37)
MCV RBC AUTO: 92.6 FL
MONOCYTES # BLD AUTO: 0.61 X10(3) UL (ref 0.1–1)
MONOCYTES NFR BLD AUTO: 4.3 %
NEUTROPHILS # BLD AUTO: 12.41 X10 (3) UL (ref 1.5–7.7)
NEUTROPHILS # BLD AUTO: 12.41 X10(3) UL (ref 1.5–7.7)
NEUTROPHILS NFR BLD AUTO: 87.3 %
PLATELET # BLD AUTO: 268 10(3)UL (ref 150–450)
RBC # BLD AUTO: 2.96 X10(6)UL
TIBC SERPL-MCNC: 279 UG/DL (ref 250–425)
TRANSFERRIN SERPL-MCNC: 187 MG/DL (ref 250–380)
WBC # BLD AUTO: 14.2 X10(3) UL (ref 4–11)

## 2023-11-21 PROCEDURE — 84466 ASSAY OF TRANSFERRIN: CPT

## 2023-11-21 PROCEDURE — 85025 COMPLETE CBC W/AUTO DIFF WBC: CPT

## 2023-11-21 PROCEDURE — 82728 ASSAY OF FERRITIN: CPT

## 2023-11-21 PROCEDURE — 36591 DRAW BLOOD OFF VENOUS DEVICE: CPT

## 2023-11-21 PROCEDURE — 83540 ASSAY OF IRON: CPT

## 2023-11-21 RX ORDER — HEPARIN SODIUM (PORCINE) LOCK FLUSH IV SOLN 100 UNIT/ML 100 UNIT/ML
5 SOLUTION INTRAVENOUS ONCE
OUTPATIENT
Start: 2023-11-21

## 2023-11-21 RX ORDER — SODIUM CHLORIDE 9 MG/ML
10 INJECTION INTRAVENOUS ONCE
OUTPATIENT
Start: 2023-11-21

## 2023-11-21 RX ORDER — HEPARIN SODIUM (PORCINE) LOCK FLUSH IV SOLN 100 UNIT/ML 100 UNIT/ML
5 SOLUTION INTRAVENOUS ONCE
Status: COMPLETED | OUTPATIENT
Start: 2023-11-21 | End: 2023-11-21

## 2023-11-21 RX ADMIN — HEPARIN SODIUM (PORCINE) LOCK FLUSH IV SOLN 100 UNIT/ML 500 UNITS: 100 SOLUTION INTRAVENOUS at 14:26:00

## 2023-11-24 ENCOUNTER — OFFICE VISIT (OUTPATIENT)
Dept: INTERNAL MEDICINE CLINIC | Facility: CLINIC | Age: 83
End: 2023-11-24

## 2023-11-24 VITALS
TEMPERATURE: 97 F | SYSTOLIC BLOOD PRESSURE: 170 MMHG | HEIGHT: 61 IN | HEART RATE: 101 BPM | RESPIRATION RATE: 17 BRPM | BODY MASS INDEX: 27.94 KG/M2 | WEIGHT: 148 LBS | DIASTOLIC BLOOD PRESSURE: 98 MMHG

## 2023-11-24 DIAGNOSIS — E11.9 TYPE 2 DIABETES MELLITUS WITHOUT RETINOPATHY (HCC): ICD-10-CM

## 2023-11-24 DIAGNOSIS — R21 RASH: Primary | ICD-10-CM

## 2023-11-24 DIAGNOSIS — K62.89 RECTAL PAIN: ICD-10-CM

## 2023-11-24 DIAGNOSIS — D50.0 IRON DEFICIENCY ANEMIA DUE TO CHRONIC BLOOD LOSS: ICD-10-CM

## 2023-11-24 DIAGNOSIS — I10 PRIMARY HYPERTENSION: ICD-10-CM

## 2023-11-24 DIAGNOSIS — Z71.85 VACCINE COUNSELING: ICD-10-CM

## 2023-11-24 DIAGNOSIS — R05.3 CHRONIC COUGH: ICD-10-CM

## 2023-11-24 PROCEDURE — 1160F RVW MEDS BY RX/DR IN RCRD: CPT | Performed by: INTERNAL MEDICINE

## 2023-11-24 PROCEDURE — 3077F SYST BP >= 140 MM HG: CPT | Performed by: INTERNAL MEDICINE

## 2023-11-24 PROCEDURE — 3008F BODY MASS INDEX DOCD: CPT | Performed by: INTERNAL MEDICINE

## 2023-11-24 PROCEDURE — 1159F MED LIST DOCD IN RCRD: CPT | Performed by: INTERNAL MEDICINE

## 2023-11-24 PROCEDURE — 99215 OFFICE O/P EST HI 40 MIN: CPT | Performed by: INTERNAL MEDICINE

## 2023-11-24 PROCEDURE — 3080F DIAST BP >= 90 MM HG: CPT | Performed by: INTERNAL MEDICINE

## 2023-11-24 RX ORDER — NYSTATIN 10B UNIT
1 POWDER (EA) MISCELLANEOUS DAILY
Qty: 1 EACH | Refills: 5 | Status: SHIPPED | OUTPATIENT
Start: 2023-11-24

## 2023-11-24 RX ORDER — CLOTRIMAZOLE AND BETAMETHASONE DIPROPIONATE 10; .64 MG/G; MG/G
1 CREAM TOPICAL 2 TIMES DAILY
Qty: 60 G | Refills: 3 | Status: SHIPPED | OUTPATIENT
Start: 2023-11-24

## 2023-11-24 NOTE — PATIENT INSTRUCTIONS
ASSESSMENT/PLAN:     Encounter Diagnoses   Name Primary? Primary hypertension Higher. Increase diovan 80 mg every 12 hrs. Call in 2 weeks. Careful with diet and excercise at least 30 minutes 3-4 times a week. Check blood pressures at different times on different days. Can purchase own blood pressure monitor. If not, check at local pharmacy. Bake foods more and grill occasionally. Avoid fried foods. No salt. Use other seasonings. Iron deficiency anemia due to chronic blood loss FU hematology. Type 2 diabetes mellitus without retinopathy ([address] Slowly Improving. Careful with diet and excercise at least 30 minutes 3-4 times a week. Check sugars at different times on different dates. Careful with low sugars. Carry something with you and check sugar if can. Can carry hanh cracker, etc. Decrease carbohydrates. But also, careful with fruits and natural sugars. One serving a day and no more than 1 handful every day. Check feet  every AM and careful with sores and ulcers on feet bilaterally. Check eyes every year with dilated eye exam.  Check sugars. 2-hour postmeal should be less than 140s. Pre-meal should be 's. Both equally affected A1c. Discussed importance of glycemic control to prevent complications of diabetes  -Discussed complications of diabetes include retinopathy, neuropathy, nephropathy and cardiovascular disease  -Discussed ABCs of DM  -Discussed importance of SBGM  -Discussed importance of low CHO diet, recommend 45gm per meal or 135gm per day  -UTD with optho       Vaccine counseling Holding on flu vaccine. Recommend mended COVID booster. Can check at local pharmacy with the [facility] office by scheduling through [address] or calling to make an appointment. Chronic cough Try claritin 10 mg  a day. Call if no betetr after 1 week. Check CXR if no better. Rash ? Tinea. Apply on the rash and a centimeter beyond rash on dry skin and a light layer. Do that for 4 weeks. If no better in 2 weeks call. If better continue for 4 weeks with powder over. Yes     Rectal pain. FU dermatology. No orders of the defined types were placed in this encounter. Meds This Visit:  Requested Prescriptions     Signed Prescriptions Disp Refills    Nystatin Does not apply Powder 1 each 5     Si Application daily. Apply over cream to rash for 4 weeks and after that just use this. clotrimazole-betamethasone 1-0.05 % External Cream 60 g 3     Sig: Apply 1 Application topically 2 (two) times daily. Apply on the rash and a centimeter beyond rash on dry skin and a light layer. Do that for 4 weeks. If no better in 2 weeks call. If better continue for 4 weeks with powder over. Imaging & Referrals:  DERM - INTERNAL      Has set FU.

## 2023-11-27 ENCOUNTER — TELEPHONE (OUTPATIENT)
Dept: HEMATOLOGY/ONCOLOGY | Facility: HOSPITAL | Age: 83
End: 2023-11-27

## 2023-11-27 NOTE — TELEPHONE ENCOUNTER
Patient's daughter called for labs results and to see if the patient should be scheduled for an infusion. Please call back. Thank you.

## 2023-11-27 NOTE — TELEPHONE ENCOUNTER
Called and spoke with patient's daughter, Thomas Perez. Told her we apologize for the long wait regarding results due to the holiday and not being in office. Told her  reviewed her labs and the patient does need more IV iron. Told her I have already messaged our infusion scheduling to hopefully get her on the schedule this week or next week. Told her we will coordinate so her follow up with Dr. Avelina Curiel will be the same day as the patient's first IV iron infusion. Told her to be expecting a call from infusion center scheduling either this week or next week. She stated understanding and thanked me for the call.

## 2023-11-27 NOTE — TELEPHONE ENCOUNTER
Stephane Silverio (Dtr) stated that the patient had her labs done on 11/21/23 and she never found out if her Mother need an an iron infusion or what and she would like an earlier appointment for her Mother to be seen as well. She would like a call back to discuss this matter.

## 2023-12-01 ENCOUNTER — OFFICE VISIT (OUTPATIENT)
Dept: HEMATOLOGY/ONCOLOGY | Facility: HOSPITAL | Age: 83
End: 2023-12-01
Attending: INTERNAL MEDICINE
Payer: MEDICARE

## 2023-12-01 ENCOUNTER — OFFICE VISIT (OUTPATIENT)
Dept: HEMATOLOGY/ONCOLOGY | Facility: HOSPITAL | Age: 83
End: 2023-12-01
Attending: PHYSICIAN ASSISTANT
Payer: MEDICARE

## 2023-12-01 VITALS
HEART RATE: 103 BPM | RESPIRATION RATE: 16 BRPM | DIASTOLIC BLOOD PRESSURE: 73 MMHG | OXYGEN SATURATION: 96 % | BODY MASS INDEX: 27.75 KG/M2 | SYSTOLIC BLOOD PRESSURE: 188 MMHG | WEIGHT: 147 LBS | HEIGHT: 61 IN | TEMPERATURE: 97 F

## 2023-12-01 VITALS — HEART RATE: 89 BPM | SYSTOLIC BLOOD PRESSURE: 150 MMHG | DIASTOLIC BLOOD PRESSURE: 65 MMHG

## 2023-12-01 DIAGNOSIS — Z45.2 ENCOUNTER FOR CARE RELATED TO VASCULAR ACCESS PORT: Primary | ICD-10-CM

## 2023-12-01 DIAGNOSIS — D50.0 IRON DEFICIENCY ANEMIA DUE TO CHRONIC BLOOD LOSS: ICD-10-CM

## 2023-12-01 DIAGNOSIS — K90.9 MALABSORPTION OF IRON: ICD-10-CM

## 2023-12-01 DIAGNOSIS — D50.0 IRON DEFICIENCY ANEMIA DUE TO CHRONIC BLOOD LOSS: Primary | ICD-10-CM

## 2023-12-01 DIAGNOSIS — K55.20 AVM (ARTERIOVENOUS MALFORMATION) OF COLON: ICD-10-CM

## 2023-12-01 PROCEDURE — 96374 THER/PROPH/DIAG INJ IV PUSH: CPT

## 2023-12-01 PROCEDURE — 99214 OFFICE O/P EST MOD 30 MIN: CPT | Performed by: INTERNAL MEDICINE

## 2023-12-01 RX ORDER — SODIUM CHLORIDE 9 MG/ML
10 INJECTION INTRAVENOUS ONCE
OUTPATIENT
Start: 2023-12-01

## 2023-12-01 RX ORDER — HEPARIN SODIUM (PORCINE) LOCK FLUSH IV SOLN 100 UNIT/ML 100 UNIT/ML
5 SOLUTION INTRAVENOUS ONCE
Status: COMPLETED | OUTPATIENT
Start: 2023-12-01 | End: 2023-12-01

## 2023-12-01 RX ORDER — HEPARIN SODIUM (PORCINE) LOCK FLUSH IV SOLN 100 UNIT/ML 100 UNIT/ML
SOLUTION INTRAVENOUS
Status: COMPLETED
Start: 2023-12-01 | End: 2023-12-01

## 2023-12-01 RX ORDER — HEPARIN SODIUM (PORCINE) LOCK FLUSH IV SOLN 100 UNIT/ML 100 UNIT/ML
5 SOLUTION INTRAVENOUS ONCE
OUTPATIENT
Start: 2023-12-01

## 2023-12-01 RX ADMIN — HEPARIN SODIUM (PORCINE) LOCK FLUSH IV SOLN 100 UNIT/ML 500 UNITS: 100 SOLUTION INTRAVENOUS at 13:57:00

## 2023-12-01 NOTE — PROGRESS NOTES
Pt here for venofer 200 mg . Pt denies any issues or concerns. Ordering MD: Hayde José Exp: 3 of 3     Pt tolerated infusion without difficulty or complaint.  Reviewed next apt date/time: Next Thursday 12/7      Education Record  Learner:  Patient  Disease / Diagnosis: anemia  Barriers / Limitations:  None  Method:  Discussion  General Topics:  Side effects and symptom management  Outcome:  Observed demonstration

## 2023-12-04 NOTE — PAT NURSING NOTE
Spoke to dtcleo Navarro who confirmed pt.is not taking any oral iron supplements and does not have a pacemaker/icd . Verified location, arrival time and parking .

## 2023-12-07 ENCOUNTER — LAB ENCOUNTER (OUTPATIENT)
Dept: LAB | Facility: HOSPITAL | Age: 83
End: 2023-12-07
Attending: INTERNAL MEDICINE
Payer: MEDICARE

## 2023-12-07 ENCOUNTER — OFFICE VISIT (OUTPATIENT)
Dept: HEMATOLOGY/ONCOLOGY | Facility: HOSPITAL | Age: 83
End: 2023-12-07
Attending: PHYSICIAN ASSISTANT
Payer: MEDICARE

## 2023-12-07 VITALS
HEART RATE: 114 BPM | OXYGEN SATURATION: 91 % | RESPIRATION RATE: 20 BRPM | DIASTOLIC BLOOD PRESSURE: 71 MMHG | SYSTOLIC BLOOD PRESSURE: 175 MMHG | TEMPERATURE: 98 F | BODY MASS INDEX: 28 KG/M2 | WEIGHT: 147.5 LBS

## 2023-12-07 DIAGNOSIS — Z01.818 PRE-OP TESTING: ICD-10-CM

## 2023-12-07 DIAGNOSIS — Z45.2 ENCOUNTER FOR CARE RELATED TO VASCULAR ACCESS PORT: Primary | ICD-10-CM

## 2023-12-07 DIAGNOSIS — D50.0 IRON DEFICIENCY ANEMIA DUE TO CHRONIC BLOOD LOSS: ICD-10-CM

## 2023-12-07 DIAGNOSIS — K90.9 MALABSORPTION OF IRON: ICD-10-CM

## 2023-12-07 LAB — SARS-COV-2 RNA RESP QL NAA+PROBE: NOT DETECTED

## 2023-12-07 PROCEDURE — 96374 THER/PROPH/DIAG INJ IV PUSH: CPT

## 2023-12-07 RX ORDER — HEPARIN SODIUM (PORCINE) LOCK FLUSH IV SOLN 100 UNIT/ML 100 UNIT/ML
5 SOLUTION INTRAVENOUS ONCE
OUTPATIENT
Start: 2023-12-07

## 2023-12-07 RX ORDER — HEPARIN SODIUM (PORCINE) LOCK FLUSH IV SOLN 100 UNIT/ML 100 UNIT/ML
5 SOLUTION INTRAVENOUS ONCE
Status: COMPLETED | OUTPATIENT
Start: 2023-12-07 | End: 2023-12-07

## 2023-12-07 RX ORDER — SODIUM CHLORIDE 9 MG/ML
10 INJECTION INTRAVENOUS ONCE
OUTPATIENT
Start: 2023-12-07

## 2023-12-07 RX ORDER — HEPARIN SODIUM (PORCINE) LOCK FLUSH IV SOLN 100 UNIT/ML 100 UNIT/ML
SOLUTION INTRAVENOUS
Status: COMPLETED
Start: 2023-12-07 | End: 2023-12-07

## 2023-12-07 RX ADMIN — HEPARIN SODIUM (PORCINE) LOCK FLUSH IV SOLN 100 UNIT/ML 500 UNITS: 100 SOLUTION INTRAVENOUS at 15:05:00

## 2023-12-07 NOTE — PROGRESS NOTES
Pt here for Venofer 200mg 2 of 3 . Prudencio complains of fatigue. She is having a capsule endoscopy tomorrow. Ordering MD: Cori Marr       Pt tolerated infusion without difficulty or complaint.  Reviewed next apt date/time: 12/14 at 1500      Education Record  Learner:  Patient and Family Member  Disease / Diagnosis: Anemia  Barriers / Limitations:  None  Method:  Brief focused  General Topics:  Plan of care reviewed  Outcome:  Shows understanding

## 2023-12-08 ENCOUNTER — HOSPITAL ENCOUNTER (OUTPATIENT)
Facility: HOSPITAL | Age: 83
Setting detail: HOSPITAL OUTPATIENT SURGERY
Discharge: HOME | End: 2023-12-08
Attending: INTERNAL MEDICINE | Admitting: INTERNAL MEDICINE
Payer: MEDICARE

## 2023-12-08 VITALS
HEART RATE: 95 BPM | RESPIRATION RATE: 20 BRPM | DIASTOLIC BLOOD PRESSURE: 77 MMHG | OXYGEN SATURATION: 93 % | SYSTOLIC BLOOD PRESSURE: 181 MMHG

## 2023-12-08 DIAGNOSIS — Z01.818 PRE-OP TESTING: Primary | ICD-10-CM

## 2023-12-08 PROCEDURE — 91110 GI TRC IMG INTRAL ESOPH-ILE: CPT | Performed by: INTERNAL MEDICINE

## 2023-12-08 PROCEDURE — 0DJ07ZZ INSPECTION OF UPPER INTESTINAL TRACT, VIA NATURAL OR ARTIFICIAL OPENING: ICD-10-PCS | Performed by: INTERNAL MEDICINE

## 2023-12-08 NOTE — OR NURSING
Patient came in for capsule endoscopy. Swallowed capsule without difficulty at 7:13AM. Daughter with patient. Detailed instructions given to both patient and daughter. Discharge paperwork provided with endoscopy's phone number ([phone]). Capsule Information  Exp Date : 2025-03-19  Lot #: [id number]  [id number]    Per capsule endoscopy diet protocol   Clear liquid diet at 9:13 AM  Light meal at 11:13 AM  Regular Diet at 3:13 PM    Patient to return equipment on 12/8/2023 at 3:13 pm.     Patient and daughter verbalized understanding.

## 2023-12-11 ENCOUNTER — TELEPHONE (OUTPATIENT)
Facility: CLINIC | Age: 83
End: 2023-12-11

## 2023-12-11 DIAGNOSIS — D50.9 IRON DEFICIENCY ANEMIA, UNSPECIFIED IRON DEFICIENCY ANEMIA TYPE: Primary | ICD-10-CM

## 2023-12-11 NOTE — H&P
History & Physical Examination    Patient Name: Rick Wagner  MRN: [id number]  CSN: [id number]  YOB: 1940    Diagnosis:   History of anemia  History of AVM small intestine. Medications Prior to Admission   Medication Sig Dispense Refill Last Dose    Nystatin Does not apply Powder 1 Application daily. Apply over cream to rash for 4 weeks and after that just use this. 1 each 5     clotrimazole-betamethasone 1-0.05 % External Cream Apply 1 Application topically 2 (two) times daily. Apply on the rash and a centimeter beyond rash on dry skin and a light layer. Do that for 4 weeks. If no better in 2 weeks call. If better continue for 4 weeks with powder over. 60 g 3     omeprazole 20 MG Oral Capsule Delayed Release Take 1 capsule (20 mg total) by mouth every morning. 90 capsule 1 2023    cyclobenzaprine 10 MG Oral Tab Take 1 tablet (10 mg total) by mouth nightly as needed for Muscle spasms. 90 tablet 1  at PRN    glipiZIDE 5 MG Oral Tab Take 2 tablets (10 mg total) by mouth daily with breakfast AND 0.5 tablets (2.5 mg total) daily with dinner. 225 tablet 0 2023    atorvastatin 80 MG Oral Tab Take 1 tablet (80 mg total) by mouth daily. 90 tablet 3 2023    glycopyrrolate 1 MG Oral Tab Take 1 tablet (1 mg total) by mouth 3 (three) times daily. 90 tablet 1 2023    [] clindamycin 300 MG Oral Cap Take 1 capsule (300 mg total) by mouth every 8 (eight) hours for 10 days. 30 capsule 0     albuterol 108 (90 Base) MCG/ACT Inhalation Aero Soln inhale 2 puff by inhalation route  every 4 - 6 hours as needed 1 each 0     [] clindamycin 300 MG Oral Cap Take 1 capsule (300 mg total) by mouth every 8 (eight) hours for 10 days. 30 capsule 0     [] clindamycin 300 MG Oral Cap Take 1 capsule (300 mg total) by mouth every 8 (eight) hours for 10 days. 30 capsule 0     hydroxychloroquine 200 MG Oral Tab Take 1 tablet (200 mg total) by mouth daily.  90 tablet 1 predniSONE 5 MG Oral Tab Take 1 tablet (5 mg total) by mouth daily. 90 tablet 1     montelukast 10 MG Oral Tab Take 1 tablet (10 mg total) by mouth nightly. 90 tablet 3 12/7/2023    leflunomide 20 MG Oral Tab Take 1 tablet (20 mg total) by mouth daily. LORATADINE 10 MG Oral Tab TAKE 1 TABLET BY MOUTH EVERY DAY 90 tablet 1     carvedilol 25 MG Oral Tab Take 1 tablet (25 mg total) by mouth 2 (two) times daily with meals. 180 tablet 3 12/7/2023    Glucose Blood (ACCU-CHEK GUIDE) In Vitro Strip Check blood sugar twice a day (fasting and before dinner). DX: E11.42, NIDDM 200 strip 1     BIOTIN OR Take by mouth.       valsartan 80 MG Oral Tab Take 1 tablet (80 mg total) by mouth daily. 90 tablet 3 12/7/2023    spironolactone 25 MG Oral Tab Take 2 tablets (50 mg total) by mouth daily. 180 tablet 1     methylPREDNISolone 4 MG Oral Tablet Therapy Pack Take as directed on package. 1 each 0     fluticasone-salmeterol (WIXELA INHUB) 250-50 MCG/ACT Inhalation Aerosol Powder, Breath Activated Inhale 1 puff into the lungs every 12 (twelve) hours. 1 each 5     albuterol 108 (90 Base) MCG/ACT Inhalation Aero Soln inhale 2 puff by inhalation route  every 4 - 6 hours as needed 1 each 5 12/7/2023    traMADol 50 MG Oral Tab Take 1 tablet (50 mg total) by mouth every 12 (twelve) hours as needed for Pain. 20 tablet 0  at PRN    predniSONE 10 MG Oral Tab Take 30 mg for 2 days then 20 mg for 2 days then 10 mg for one day and after that continue with your regular 11 tablet 0     Accu-Chek FastClix Lancets Does not apply Misc Check sugar once daily as directed 100 each 2     Blood Glucose Monitoring Suppl (ONETOUCH VERIO) w/Device Does not apply Kit 1 each daily. Test 1x daily 1 kit 0     Glucose Blood (ONETOUCH VERIO) In Vitro Strip Test 1x daily 100 strip 1     OneTouch Delica Lancets 78C Does not apply Misc 4 x daily 100 each 1     Blood Glucose Monitoring Suppl (BLOOD GLUCOSE SYSTEM GILLES) Does not apply Kit DX E 11.9.   Checks every morning. 1 kit 0     Lancets Does not apply Misc DX E 11.9. Checks every morning. 50 each 11     Blood Glucose Monitoring Suppl w/Device Does not apply Kit DX E 11.9. Checks every morning. 1 kit 0     Blood Gluc Meter Disp-Strips Does not apply Device DX E 11.9. Checks every morning. 100 each 11     acetaminophen 500 MG Oral Tab Take 1 tablet (500 mg total) by mouth every 6 (six) hours as needed for Pain. clonazePAM 0.5 MG Oral Tab Take 1 tablet (0.5 mg total) by mouth 2 (two) times daily as needed for Anxiety. Patient is taking one tablet by mouth every night at bedtime as needed for anxiety. (Patient not taking: Reported on 11/3/2023)       aspirin 81 MG Oral Tab EC Take 325 mg by mouth daily. (Patient not taking: Reported on 11/3/2023)        No current facility-administered medications for this encounter. Allergies:    Allergies   Allergen Reactions    Celebrex [Celecoxib] PALPITATIONS    Penicillins ITCHING and SWELLING    Metformin And Related UNKNOWN    Olmesartan UNKNOWN       Past Medical History:   Diagnosis Date    Anxiety state     Cancer [facility])     breast cancer 2018    Chronic obstructive pulmonary disease, unspecified ([address] 05/04/2023    Chronic obstructive pulmonary disease, unspecified ([address] 05/04/2023    Diabetes ([address]     Essential hypertension     Exposure to medical diagnostic radiation     High blood pressure     High cholesterol     History of blood transfusion 07/2022    low hemoglobin    Osteoarthritis     PONV (postoperative nausea and vomiting)     Pulmonary emphysema ([facility])     Rheumatoid arthritis ([address]      Past Surgical History:   Procedure Laterality Date    CATARACT EXTRACTION W/  INTRAOCULAR LENS IMPLANT Bilateral 2017    Dr in [address] [place] [address]     COLONOSCOPY      COLONOSCOPY N/A 07/21/2022    Procedure: COLONOSCOPY;  Surgeon: Holly Matthews MD;  Location: [address] [place] [address] ENDOSCOPY    COLONOSCOPY N/A 6/15/2023    Procedure: COLONOSCOPY;  Surgeon: Holly Matthews MD;  Location: [address] [place] [address] ENDOSCOPY    CORRECT BUNION,SIMPLE      right foot  [address], no abnormal Paps, due to heavy bleeding with fibroids. Not sure if it had bilateral salpingo-oophorectomy. IR ANEURYSM REPAIRM      Computer assisted volumetric craniofomy with clipping of anterior cerebral artery. LUMPECTOMY RIGHT      RADIATION RIGHT      ROTATOR CUFF REPAIR      1993    TOTAL KNEE REPLACEMENT Right 2010    TOTAL KNEE REPLACEMENT Left 2015    TRANSORAL INCISIONLESS FUNDOPLICATION - INTERNAL   Fredy fundoplication at [facility] Dr. Yaron Stoen. Fredy fundoplication at [facility] Dr. Yaron Stone. TRIGGER FINGER RELEASE      left hand      YAG CAPSULOTOMY - OU - BOTH EYES Bilateral 2021    done in [place]     Family History   Problem Relation Age of Onset    Heart Surgery Mother         Leaky valve at 37 y/o. Prostate Cancer Brother     Cancer Brother     Diabetes Maternal Grandmother     Diabetes Paternal Aunt     Breast Cancer Self 77    Breast Cancer Niece         before 48    Macular degeneration Neg     Glaucoma Neg      Social History     Tobacco Use    Smoking status: Former     Packs/day: .25     Types: Cigarettes     Quit date:      Years since quittin.9    Smokeless tobacco: Never    Tobacco comments:        Substance Use Topics    Alcohol use: Never       SYSTEM Check if Review is Normal Check if Physical Exam is Normal If not normal, please explain:   HEENT [x ] [ x]    NECK & BACK [x ] [x ]    HEART [x ] [ x]    LUNGS [x ] [ x]    ABDOMEN [x ] [x ]    UROGENITAL [ ] [ ]    EXTREMITIES [x ] [x ]    OTHER        [ x ] I have discussed the risks and benefits and alternatives with the patient/family. They understand and agree to proceed with plan of care. [ x ] I have reviewed the History and Physical done within the last 30 days. Any changes noted above. [place].  Morgan Murphy MD

## 2023-12-11 NOTE — OPERATIVE REPORT
[facility] Capsule Endoscopy Report  Date of procedure-December 8, 2023    Preoperative Diagnosis:  -History of anemia  -History of gastrointestinal AVMs      Postoperative Diagnosis:  -Small bowel AVMs with one focal bleeding area  -Gastric erosion    Procedure:    Capsule endoscopy with PillCam SB 3    Surgeon:  Joelle Iglesias M.D. Technique:  After informed consent, the patient was set up with the recording device and swallowed the capsule without difficulty. She brought the recording device back after 8 hours and images were downloaded to the computer and viewed. The procedures were well tolerated without immediate complication. Findings:  Small bowel passage time of 4 hours and 28 minutes, small bowel preparation judged as adequate however liquid plus small bowel contents were noted at 1 hour and 44 minutes and 34 seconds obscuring some of the view in this region. AVMs noted in the small bowel as outlined below, 1 small gastric erosion was noted. Impression:  -Multiple AVMs noted in the small bowel, predominance of these were located in the duodenal region within 30 minutes or so.  1 area of oozing was noted at 23 minutes and 21 seconds. AVMs were noted further downstream as well 2 of these were smaller, less conspicuous and rare. -Superficial gastric erosion noted in the antrum of the stomach    Recommendations:  - Post procedure instructions given  - Repeat EGD/enteroscopy for treatment of AVMs  - Follow blood counts, iron replacement therapy and consider octreotide. Amaris Fernández MD  12/11/2023  4:49 PM

## 2023-12-11 NOTE — TELEPHONE ENCOUNTER
Patient's daughter Beverley Mcnamara contacted and discussed results of the capsule. She does have AVMs in the small bowel as we suspected. There was a slight area of oozing noted likely within the duodenum or proximal small bowel. I think we can get to this with EGD/enteroscopy. Nursing staff to set up for enteroscopy/EGD in the GI lab at the hospital with MAC sedation for anemia and bleeding AVMs small intestine. ASAP  -Check blood counts to monitor for progressive anemia  -Follow-up with Dr. Toña King for assistance with the anemia and iron replacement therapy.

## 2023-12-13 ENCOUNTER — MED REC SCAN ONLY (OUTPATIENT)
Facility: CLINIC | Age: 83
End: 2023-12-13

## 2023-12-13 NOTE — TELEPHONE ENCOUNTER
Scheduled for:  EGD 43234 with Peds scope   Provider Name:  Dr Royer Ballard  Date:  12/21/2023  Location:  [facility]    Sedation:  MAC  Time:  9:00am (pt is aware to arrive at 8:00am)  Prep:  NPO after midnight  Meds/Allergies Reconciled?:  Physician reviewed   Diagnosis with codes:  Anemia D50.9  Was patient informed to call insurance with codes (Y/N):  Yes, I confirmed the insurance with the patient. Referral sent?:  N/A  [facility] or [facility] notified?:  I sent an electronic request to Endo Scheduling and received a confirmation today. Medication Orders: This patient verbally confirmed that she is not taking:   Iron, blood thinners, BP meds, and is diabetic   Not latex allergy, Not PCN allergy and does not have a pacemaker  Patient is aware to HOLD Glipizide the day of the procedure      Misc Orders:  I discussed the prep instructions with the patient's daughter over the phone which she verbally understood and is aware that I will mail the instructions today.       Further instructions given by staff:

## 2023-12-14 ENCOUNTER — APPOINTMENT (OUTPATIENT)
Dept: LAB | Facility: HOSPITAL | Age: 83
End: 2023-12-14
Attending: INTERNAL MEDICINE
Payer: MEDICARE

## 2023-12-14 ENCOUNTER — TELEPHONE (OUTPATIENT)
Facility: CLINIC | Age: 83
End: 2023-12-14

## 2023-12-14 ENCOUNTER — OFFICE VISIT (OUTPATIENT)
Dept: HEMATOLOGY/ONCOLOGY | Facility: HOSPITAL | Age: 83
End: 2023-12-14
Attending: PHYSICIAN ASSISTANT
Payer: MEDICARE

## 2023-12-14 VITALS
WEIGHT: 147 LBS | TEMPERATURE: 98 F | SYSTOLIC BLOOD PRESSURE: 174 MMHG | DIASTOLIC BLOOD PRESSURE: 72 MMHG | BODY MASS INDEX: 28 KG/M2 | HEART RATE: 82 BPM | OXYGEN SATURATION: 95 % | RESPIRATION RATE: 18 BRPM

## 2023-12-14 DIAGNOSIS — D50.0 IRON DEFICIENCY ANEMIA DUE TO CHRONIC BLOOD LOSS: ICD-10-CM

## 2023-12-14 DIAGNOSIS — K90.9 MALABSORPTION OF IRON: ICD-10-CM

## 2023-12-14 DIAGNOSIS — Z45.2 ENCOUNTER FOR CARE RELATED TO VASCULAR ACCESS PORT: Primary | ICD-10-CM

## 2023-12-14 LAB
ALBUMIN SERPL-MCNC: 3.4 G/DL (ref 3.2–4.8)
ALP LIVER SERPL-CCNC: 83 U/L
ALT SERPL-CCNC: <7 U/L
APTT PPP: 30.8 SECONDS (ref 23.3–35.6)
AST SERPL-CCNC: 18 U/L (ref ?–34)
BASOPHILS # BLD AUTO: 0.02 X10(3) UL (ref 0–0.2)
BASOPHILS NFR BLD AUTO: 0.2 %
BILIRUB DIRECT SERPL-MCNC: <0.1 MG/DL (ref ?–0.3)
BILIRUB SERPL-MCNC: 0.2 MG/DL (ref 0.2–1.1)
DEPRECATED RDW RBC AUTO: 63.2 FL (ref 35.1–46.3)
EOSINOPHIL # BLD AUTO: 0.11 X10(3) UL (ref 0–0.7)
EOSINOPHIL NFR BLD AUTO: 1.2 %
ERYTHROCYTE [DISTWIDTH] IN BLOOD BY AUTOMATED COUNT: 18.5 % (ref 11–15)
HCT VFR BLD AUTO: 31.1 %
HGB BLD-MCNC: 9.3 G/DL
IMM GRANULOCYTES # BLD AUTO: 0.05 X10(3) UL (ref 0–1)
IMM GRANULOCYTES NFR BLD: 0.6 %
INR BLD: 0.93 (ref 0.8–1.2)
LYMPHOCYTES # BLD AUTO: 0.85 X10(3) UL (ref 1–4)
LYMPHOCYTES NFR BLD AUTO: 9.6 %
MCH RBC QN AUTO: 28 PG (ref 26–34)
MCHC RBC AUTO-ENTMCNC: 29.9 G/DL (ref 31–37)
MCV RBC AUTO: 93.7 FL
MONOCYTES # BLD AUTO: 0.55 X10(3) UL (ref 0.1–1)
MONOCYTES NFR BLD AUTO: 6.2 %
NEUTROPHILS # BLD AUTO: 7.26 X10 (3) UL (ref 1.5–7.7)
NEUTROPHILS # BLD AUTO: 7.26 X10(3) UL (ref 1.5–7.7)
NEUTROPHILS NFR BLD AUTO: 82.2 %
PLATELET # BLD AUTO: 290 10(3)UL (ref 150–450)
PROT SERPL-MCNC: 6.7 G/DL (ref 5.7–8.2)
PROTHROMBIN TIME: 13 SECONDS (ref 11.6–14.8)
RBC # BLD AUTO: 3.32 X10(6)UL
WBC # BLD AUTO: 8.8 X10(3) UL (ref 4–11)

## 2023-12-14 PROCEDURE — 80076 HEPATIC FUNCTION PANEL: CPT | Performed by: INTERNAL MEDICINE

## 2023-12-14 PROCEDURE — 85730 THROMBOPLASTIN TIME PARTIAL: CPT | Performed by: INTERNAL MEDICINE

## 2023-12-14 PROCEDURE — 96374 THER/PROPH/DIAG INJ IV PUSH: CPT

## 2023-12-14 PROCEDURE — 85025 COMPLETE CBC W/AUTO DIFF WBC: CPT | Performed by: INTERNAL MEDICINE

## 2023-12-14 PROCEDURE — 85610 PROTHROMBIN TIME: CPT | Performed by: INTERNAL MEDICINE

## 2023-12-14 PROCEDURE — 80061 LIPID PANEL: CPT | Performed by: INTERNAL MEDICINE

## 2023-12-14 RX ORDER — HEPARIN SODIUM (PORCINE) LOCK FLUSH IV SOLN 100 UNIT/ML 100 UNIT/ML
5 SOLUTION INTRAVENOUS ONCE
OUTPATIENT
Start: 2023-12-14

## 2023-12-14 RX ORDER — SODIUM CHLORIDE 9 MG/ML
10 INJECTION INTRAVENOUS ONCE
OUTPATIENT
Start: 2023-12-14

## 2023-12-14 RX ORDER — HEPARIN SODIUM (PORCINE) LOCK FLUSH IV SOLN 100 UNIT/ML 100 UNIT/ML
5 SOLUTION INTRAVENOUS ONCE
Status: COMPLETED | OUTPATIENT
Start: 2023-12-14 | End: 2023-12-14

## 2023-12-14 RX ORDER — HEPARIN SODIUM (PORCINE) LOCK FLUSH IV SOLN 100 UNIT/ML 100 UNIT/ML
SOLUTION INTRAVENOUS
Status: COMPLETED
Start: 2023-12-14 | End: 2023-12-14

## 2023-12-14 RX ADMIN — HEPARIN SODIUM (PORCINE) LOCK FLUSH IV SOLN 100 UNIT/ML 500 UNITS: 100 SOLUTION INTRAVENOUS at 16:00:00

## 2023-12-14 NOTE — TELEPHONE ENCOUNTER
Daughter contacted. I let her know that iron infusions are ok but no oral iron the week prior to the procedure. She also wanted me to give endoscopy a heads up that she will need her port accessed when she comes in because there was an issue last time with access. I called PAT spoke to [facility] and let her know.

## 2023-12-14 NOTE — PROGRESS NOTES
Pt here for 3 of 3 VENOFER 200mg . Pt denies any issues or concerns. Ordering MD: Hayde José Exp: . Pt tolerated infusion without difficulty or complaint.  Reviewed next apt date/time: YES      Education Record  Learner:  Patient and Family Member  Disease / Diagnosis: iron deficiency anemia  Barriers / Limitations:  None  Method:  Discussion  General Topics:  Plan of care reviewed  Outcome:  Shows understanding

## 2023-12-14 NOTE — TELEPHONE ENCOUNTER
Pt daughter is calling to find out if pt should hold the Iron infusion that is scheduled for today.   EGD is scheduled for 12/21/23

## 2023-12-15 ENCOUNTER — APPOINTMENT (OUTPATIENT)
Dept: HEMATOLOGY/ONCOLOGY | Facility: HOSPITAL | Age: 83
End: 2023-12-15
Attending: PHYSICIAN ASSISTANT
Payer: MEDICARE

## 2023-12-21 ENCOUNTER — ANESTHESIA EVENT (OUTPATIENT)
Dept: ENDOSCOPY | Facility: HOSPITAL | Age: 83
End: 2023-12-21
Payer: MEDICARE

## 2023-12-21 ENCOUNTER — HOSPITAL ENCOUNTER (OUTPATIENT)
Facility: HOSPITAL | Age: 83
Setting detail: HOSPITAL OUTPATIENT SURGERY
Discharge: HOME | End: 2023-12-21
Attending: INTERNAL MEDICINE | Admitting: INTERNAL MEDICINE
Payer: MEDICARE

## 2023-12-21 ENCOUNTER — ANESTHESIA (OUTPATIENT)
Dept: ENDOSCOPY | Facility: HOSPITAL | Age: 83
End: 2023-12-21
Payer: MEDICARE

## 2023-12-21 VITALS
RESPIRATION RATE: 12 BRPM | WEIGHT: 147 LBS | OXYGEN SATURATION: 100 % | SYSTOLIC BLOOD PRESSURE: 183 MMHG | DIASTOLIC BLOOD PRESSURE: 116 MMHG | BODY MASS INDEX: 27.75 KG/M2 | HEART RATE: 88 BPM | HEIGHT: 61 IN

## 2023-12-21 LAB — GLUCOSE BLDC GLUCOMTR-MCNC: 202 MG/DL (ref 70–99)

## 2023-12-21 PROCEDURE — 43270 EGD LESION ABLATION: CPT | Performed by: INTERNAL MEDICINE

## 2023-12-21 PROCEDURE — 0D598ZZ DESTRUCTION OF DUODENUM, VIA NATURAL OR ARTIFICIAL OPENING ENDOSCOPIC: ICD-10-PCS | Performed by: INTERNAL MEDICINE

## 2023-12-21 RX ORDER — SODIUM CHLORIDE, SODIUM LACTATE, POTASSIUM CHLORIDE, CALCIUM CHLORIDE 600; 310; 30; 20 MG/100ML; MG/100ML; MG/100ML; MG/100ML
INJECTION, SOLUTION INTRAVENOUS CONTINUOUS
Status: DISCONTINUED | OUTPATIENT
Start: 2023-12-21 | End: 2023-12-21

## 2023-12-21 RX ORDER — LIDOCAINE HYDROCHLORIDE 10 MG/ML
INJECTION, SOLUTION EPIDURAL; INFILTRATION; INTRACAUDAL; PERINEURAL AS NEEDED
Status: DISCONTINUED | OUTPATIENT
Start: 2023-12-21 | End: 2023-12-21 | Stop reason: SURG

## 2023-12-21 RX ADMIN — LIDOCAINE HYDROCHLORIDE 50 MG: 10 INJECTION, SOLUTION EPIDURAL; INFILTRATION; INTRACAUDAL; PERINEURAL at 09:28:00

## 2023-12-21 NOTE — DISCHARGE INSTRUCTIONS
Home Care Instructions for Gastroscopy with Sedation    Diet:  - SOFT food today. Chew slowly and drink plenty of water and liquids today. Resume your regular diet as tolerated unless otherwise instructed. - Start with light meals to minimize bloating.  - Do not drink alcohol today. Medication:  - If you have questions about resuming your normal medications, please contact your Primary Care Physician. Activities:  - Take it easy today. Do not return to work today. - Do not drive today. - Do not operate any machinery today (including kitchen equipment). Gastroscopy:  - You may have a sore throat for 2-3 days following the exam. This is normal. Gargling with warm salt water (1/2 tsp salt to 1 glass warm water) or using throat lozenges will help. - If you experience any sharp pain in your neck, abdomen or chest, vomiting of blood, oral temperature over 100 degrees Fahrenheit, light-headedness or dizziness, or any other problems, contact your doctor. **If unable to reach your doctor, please go to the [facility] Emergency Room**    - Your referring physician will receive a full report of your examination.  - If you do not hear from your doctor's office within two weeks of your biopsy, please call them for your results. You may be able to see your laboratory results in [facility] between 4 and 7 business days. In some cases, your physician may not have viewed the results before they are released to [address] If you have questions regarding your results contact the physician who ordered the test/exam by phone or via [address] by choosing \"Ask a Medical Question. \"

## 2023-12-21 NOTE — ANESTHESIA POSTPROCEDURE EVALUATION
Patient: Jose Holley    Procedure Summary       Date: 12/21/23 Room / Location: [address] [place] [address] ENDOSCOPY 04 / [address] [facility] ENDOSCOPY    Anesthesia Start: 2440 Anesthesia Stop:     Procedure: ESOPHAGOGASTRODUODENOSCOPY with push enteroscopy Diagnosis:       Iron deficiency anemia, unspecified iron deficiency anemia type      (Duodenal AVM's x4 cauterized)    Surgeons: Marla Marti MD Anesthesiologist: Danyell Ordoñez MD    Anesthesia Type: MAC ASA Status: 3            Anesthesia Type: MAC    Vitals Value Taken Time   /80 12/21/23 0954   Temp  12/21/23 0955   Pulse 84 12/21/23 0954   Resp 24 12/21/23 0954   SpO2 99 % 12/21/23 0954       [facility] AN Post Evaluation:   Patient Evaluated in PACU  Patient Participation: complete - patient participated  Level of Consciousness: awake  Pain Score: 0  Pain Management: adequate  Airway Patency:patent  Dental exam unchanged from preop  Yes    Cardiovascular Status: acceptable  Respiratory Status: acceptable  Postoperative Hydration acceptable      Aamir Diaz MD  12/21/2023 9:55 AM

## 2023-12-21 NOTE — H&P
History & Physical Examination    Patient Name: Parisa Harris  MRN: [id number]  [facility]: [id number]  YOB: 1940    Diagnosis:   Anemia   AVMs small bowel ( on capsule)       Medications Prior to Admission   Medication Sig Dispense Refill Last Dose    rosuvastatin (CRESTOR) 20 MG Oral Tab Take 1 tablet (20 mg total) by mouth nightly. 90 tablet 0 12/20/2023 at 2000    omeprazole 20 MG Oral Capsule Delayed Release Take 1 capsule (20 mg total) by mouth every morning. 90 capsule 1 12/20/2023 at 1100    cyclobenzaprine 10 MG Oral Tab Take 1 tablet (10 mg total) by mouth nightly as needed for Muscle spasms. 90 tablet 1 12/21/2023 at 0200    glipiZIDE 5 MG Oral Tab Take 2 tablets (10 mg total) by mouth daily with breakfast AND 0.5 tablets (2.5 mg total) daily with dinner. 225 tablet 0 12/20/2023 at 1100    glycopyrrolate 1 MG Oral Tab Take 1 tablet (1 mg total) by mouth 3 (three) times daily. 90 tablet 1 12/20/2023 at 1100    albuterol 108 (90 Base) MCG/ACT Inhalation Aero Soln inhale 2 puff by inhalation route  every 4 - 6 hours as needed 1 each 0     hydroxychloroquine 200 MG Oral Tab Take 1 tablet (200 mg total) by mouth daily. 90 tablet 1 12/20/2023 at 1100    predniSONE 5 MG Oral Tab Take 1 tablet (5 mg total) by mouth daily. 90 tablet 1 12/20/2023 at 1100    montelukast 10 MG Oral Tab Take 1 tablet (10 mg total) by mouth nightly. 90 tablet 3 12/20/2023 at 2000    LORATADINE 10 MG Oral Tab TAKE 1 TABLET BY MOUTH EVERY DAY 90 tablet 1 12/20/2023 at 1100    carvedilol 25 MG Oral Tab Take 1 tablet (25 mg total) by mouth 2 (two) times daily with meals. 180 tablet 3 12/21/2023 at 0600    valsartan 80 MG Oral Tab Take 1 tablet (80 mg total) by mouth daily. 90 tablet 3  at 0630    spironolactone 25 MG Oral Tab Take 2 tablets (50 mg total) by mouth daily.  180 tablet 1 12/20/2023 at 1100    fluticasone-salmeterol (Phuc Cho) 250-50 MCG/ACT Inhalation Aerosol Powder, Breath Activated Inhale 1 puff into the lungs every 12 (twelve) hours. 1 each 5     albuterol 108 (90 Base) MCG/ACT Inhalation Aero Soln inhale 2 puff by inhalation route  every 4 - 6 hours as needed 1 each 5  at prn    acetaminophen 500 MG Oral Tab Take 1 tablet (500 mg total) by mouth every 6 (six) hours as needed for Pain. 2023 at 1100    Nystatin Does not apply Powder 1 Application daily. Apply over cream to rash for 4 weeks and after that just use this. 1 each 5     clotrimazole-betamethasone 1-0.05 % External Cream Apply 1 Application topically 2 (two) times daily. Apply on the rash and a centimeter beyond rash on dry skin and a light layer. Do that for 4 weeks. If no better in 2 weeks call. If better continue for 4 weeks with powder over. 60 g 3     [] clindamycin 300 MG Oral Cap Take 1 capsule (300 mg total) by mouth every 8 (eight) hours for 10 days. 30 capsule 0     [] clindamycin 300 MG Oral Cap Take 1 capsule (300 mg total) by mouth every 8 (eight) hours for 10 days. (Patient not taking: Reported on 2023) 30 capsule 0 Not Taking    leflunomide 20 MG Oral Tab Take 1 tablet (20 mg total) by mouth daily. Glucose Blood (ACCU-CHEK GUIDE) In Vitro Strip Check blood sugar twice a day (fasting and before dinner). DX: E11.42, NIDDM 200 strip 1     BIOTIN OR Take by mouth. (Patient not taking: Reported on 2023)   Not Taking    methylPREDNISolone 4 MG Oral Tablet Therapy Pack Take as directed on package. (Patient not taking: Reported on 2023) 1 each 0 Not Taking    traMADol 50 MG Oral Tab Take 1 tablet (50 mg total) by mouth every 12 (twelve) hours as needed for Pain.  20 tablet 0     predniSONE 10 MG Oral Tab Take 30 mg for 2 days then 20 mg for 2 days then 10 mg for one day and after that continue with your regular (Patient not taking: Reported on 2023) 11 tablet 0 Not Taking    Accu-Chek FastClix Lancets Does not apply Misc Check sugar once daily as directed 100 each 2     Blood Glucose Monitoring Suppl (Artur Fadi) w/Device Does not apply Kit 1 each daily. Test 1x daily 1 kit 0     Glucose Blood (ONETOUCH VERIO) In Vitro Strip Test 1x daily 100 strip 1     OneTouch Delica Lancets 39L Does not apply Misc 4 x daily 100 each 1     Blood Glucose Monitoring Suppl (BLOOD GLUCOSE SYSTEM GILLES) Does not apply Kit DX E 11.9. Checks every morning. 1 kit 0     Lancets Does not apply Misc DX E 11.9. Checks every morning. 50 each 11     Blood Glucose Monitoring Suppl w/Device Does not apply Kit DX E 11.9. Checks every morning. 1 kit 0     Blood Gluc Meter Disp-Strips Does not apply Device DX E 11.9. Checks every morning. 100 each 11     clonazePAM 0.5 MG Oral Tab Take 1 tablet (0.5 mg total) by mouth 2 (two) times daily as needed for Anxiety. Patient is taking one tablet by mouth every night at bedtime as needed for anxiety. (Patient not taking: Reported on 12/21/2023)   Not Taking    aspirin 81 MG Oral Tab EC Take 325 mg by mouth daily. (Patient not taking: Reported on 11/3/2023)        Current Facility-Administered Medications   Medication Dose Route Frequency    lactated ringers infusion   Intravenous Continuous     Facility-Administered Medications Ordered in Other Encounters   Medication Dose Route Frequency    lidocaine PF (Xylocaine-MPF) 1% injection   Intravenous PRN    propofol (Diprivan) 10 MG/ML injection   Intravenous PRN    propofol (Diprivan) 10 mg/mL infusion premix   Intravenous Continuous PRN       Allergies:    Allergies   Allergen Reactions    Celebrex [Celecoxib] PALPITATIONS    Penicillins ITCHING and SWELLING    Metformin And Related UNKNOWN    Olmesartan UNKNOWN       Past Medical History:   Diagnosis Date    Anxiety state     Cancer [facility])     breast cancer 2018    Chronic obstructive pulmonary disease, unspecified ([facility] 05/04/2023    Chronic obstructive pulmonary disease, unspecified ([facility] 05/04/2023    COPD (chronic obstructive pulmonary disease) ([facility]     Diabetes ([facility]     Essential hypertension     Exposure to medical diagnostic radiation     High blood pressure     High cholesterol     History of blood transfusion 07/2022    low hemoglobin    Osteoarthritis     PONV (postoperative nausea and vomiting)     Pulmonary emphysema (HCC)     Rheumatoid arthritis ([address]      Past Surgical History:   Procedure Laterality Date    CATARACT EXTRACTION W/  INTRAOCULAR LENS IMPLANT Bilateral 2017    Dr in [address] [place] [address]     COLONOSCOPY      COLONOSCOPY N/A 07/21/2022    Procedure: COLONOSCOPY;  Surgeon: Ryann Yoder MD;  Location: [facility]    COLONOSCOPY N/A 06/15/2023    Procedure: COLONOSCOPY;  Surgeon: Ryann Yoder MD;  Location: [facility] ENDOSCOPY    CORRECT Duane Octave      right foot  1993    EGD  12/21/2023    Dr. Lena Kelly; Duodenal AVM's x4 cauterized    HYSTERECTOMY      1972, no abnormal Paps, due to heavy bleeding with fibroids. Not sure if it had bilateral salpingo-oophorectomy. IR ANEURYSM REPAIRM  2010    Computer assisted volumetric craniofomy with clipping of anterior cerebral artery. LUMPECTOMY RIGHT      RADIATION RIGHT      ROTATOR CUFF REPAIR      1993    TOTAL KNEE REPLACEMENT Right 2010    TOTAL KNEE REPLACEMENT Left 07/02/2015    TRANSORAL INCISIONLESS FUNDOPLICATION - INTERNAL  19/39/5370 Fredy fundoplication at [facility] Dr. Marianne Clark. Fredy fundoplication at [facility] Dr. Marianne Clark. TRIGGER FINGER RELEASE      left hand  2005    YAG CAPSULOTOMY - OU - BOTH EYES Bilateral 09/2021    done in [address] [place] [address]     Family History   Problem Relation Age of Onset    Heart Surgery Mother         Leaky valve at 37 y/o.      Prostate Cancer Brother     Cancer Brother     Diabetes Maternal Grandmother     Diabetes Paternal Aunt     Breast Cancer Self 77    Breast Cancer Niece         before 47    Macular degeneration Neg     Glaucoma Neg      Social History     Tobacco Use    Smoking status: Former     Packs/day: .25     Types: Cigarettes     Quit date: 1997 Years since quittin.9    Smokeless tobacco: Never    Tobacco comments:     [id number]   Substance Use Topics    Alcohol use: Never       SYSTEM Check if Review is Normal Check if Physical Exam is Normal If not normal, please explain:   HEENT [x ] [ x]    NECK & BACK [x ] [x ]    HEART [x ] [ x]    LUNGS [x ] [ x]    ABDOMEN [x ] [x ]    UROGENITAL [ ] [ ]    EXTREMITIES [x ] [x ]    OTHER        [ x ] I have discussed the risks and benefits and alternatives with the patient/family. They understand and agree to proceed with plan of care. [ x ] I have reviewed the History and Physical done within the last 30 days. Any changes noted above. Paolo Arias MD  2023  9:51 AM

## 2023-12-21 NOTE — OPERATIVE REPORT
[facility] Endoscopy Report  Date of procedure-December 21, 2023    Preoperative Diagnosis:  -History of anemia/chronic GI blood loss  -Small bowel AVMs  -Recent capsule with AVMs oozing duodenum      Postoperative Diagnosis:  -AVMs x 4 status posttreatment, one of these was intermittently bleeding  -Hiatal hernia    Procedure:    Esophagogastroduodenoscopy       Surgeon:  Natalie Sanchez M.D. Anesthesia:  MAC     Technique:  After informed consent, the patient was placed in the left lateral recumbent position. An Olympus adult HD gastroscope was inserted into the hypopharynx and advanced under direct vision into the esophagus, stomach and duodenum. The endoscope was withdrawn to the stomach where retroflexion of the annulus, body, cardia and fundus was performed. The instrument was straightened, insufflated air and fluid were suctioned and the endoscope was withdrawn. The procedure was well tolerated without immediate complication. Findings: The esophagus was normal.  The GE junction and diaphragmatic impression were at 35 cm and 37 cm, small hiatal hernia noted. The stomach distended appropriately with insufflated air. The mucosa of the stomach including cardia, fundus, gastric body and antrum were normal.  The duodenal bulb showed 2 tiny pinpoint AVMs and 2 small AVMs were noted in the second portion of the duodenum as well, these were all treated with APC on duodenal settings. One of the AVMs in the second portion of duodenum was noted to be intermittently actively bleeding. This was cauterized with excellent result/hemostasis. Estimated blood loss-insignificant  Specimens-see above    Impression:  -AVMs x 4 status posttreatment, one of these was intermittently bleeding  -Hiatal hernia    Recommendations:  - Post procedure instructions given  - Follow hgb and iron replacement  - Discussed other options, ocreatide          Liz Marin.  Katelyn Ordaz MD  12/21/2023  9:51 AM

## 2024-01-15 RX ORDER — BLOOD SUGAR DIAGNOSTIC
STRIP MISCELLANEOUS
Qty: 200 STRIP | Refills: 1 | Status: SHIPPED | OUTPATIENT
Start: 2024-01-15

## 2024-01-31 DIAGNOSIS — K55.20 AVM (ARTERIOVENOUS MALFORMATION) OF COLON: ICD-10-CM

## 2024-01-31 DIAGNOSIS — D50.0 IRON DEFICIENCY ANEMIA DUE TO CHRONIC BLOOD LOSS: Primary | ICD-10-CM

## 2024-02-02 ENCOUNTER — APPOINTMENT (OUTPATIENT)
Dept: ULTRASOUND IMAGING | Facility: HOSPITAL | Age: 84
End: 2024-02-02
Attending: STUDENT IN AN ORGANIZED HEALTH CARE EDUCATION/TRAINING PROGRAM
Payer: MEDICARE

## 2024-02-02 ENCOUNTER — OFFICE VISIT (OUTPATIENT)
Dept: HEMATOLOGY/ONCOLOGY | Facility: HOSPITAL | Age: 84
End: 2024-02-02
Attending: INTERNAL MEDICINE
Payer: MEDICARE

## 2024-02-02 ENCOUNTER — APPOINTMENT (OUTPATIENT)
Dept: GENERAL RADIOLOGY | Facility: HOSPITAL | Age: 84
End: 2024-02-02
Attending: STUDENT IN AN ORGANIZED HEALTH CARE EDUCATION/TRAINING PROGRAM
Payer: MEDICARE

## 2024-02-02 ENCOUNTER — HOSPITAL ENCOUNTER (INPATIENT)
Facility: HOSPITAL | Age: 84
LOS: 2 days | Discharge: HOME HEALTH | End: 2024-02-04
Attending: STUDENT IN AN ORGANIZED HEALTH CARE EDUCATION/TRAINING PROGRAM | Admitting: INTERNAL MEDICINE
Payer: MEDICARE

## 2024-02-02 ENCOUNTER — DOCUMENTATION ONLY (OUTPATIENT)
Dept: HEMATOLOGY/ONCOLOGY | Facility: HOSPITAL | Age: 84
End: 2024-02-02

## 2024-02-02 VITALS
HEART RATE: 105 BPM | WEIGHT: 145.19 LBS | DIASTOLIC BLOOD PRESSURE: 75 MMHG | HEIGHT: 61 IN | OXYGEN SATURATION: 97 % | TEMPERATURE: 98 F | SYSTOLIC BLOOD PRESSURE: 177 MMHG | BODY MASS INDEX: 27.41 KG/M2 | RESPIRATION RATE: 16 BRPM

## 2024-02-02 DIAGNOSIS — K90.9 MALABSORPTION OF IRON: ICD-10-CM

## 2024-02-02 DIAGNOSIS — J81.0 ACUTE PULMONARY EDEMA (HCC): Primary | ICD-10-CM

## 2024-02-02 DIAGNOSIS — R53.1 WEAKNESS GENERALIZED: ICD-10-CM

## 2024-02-02 DIAGNOSIS — E87.6 HYPOKALEMIA: ICD-10-CM

## 2024-02-02 DIAGNOSIS — D50.0 IRON DEFICIENCY ANEMIA DUE TO CHRONIC BLOOD LOSS: ICD-10-CM

## 2024-02-02 DIAGNOSIS — K55.20 AVM (ARTERIOVENOUS MALFORMATION) OF COLON: ICD-10-CM

## 2024-02-02 DIAGNOSIS — Z45.2 ENCOUNTER FOR CARE RELATED TO VASCULAR ACCESS PORT: Primary | ICD-10-CM

## 2024-02-02 DIAGNOSIS — D72.829 LEUKOCYTOSIS, UNSPECIFIED TYPE: ICD-10-CM

## 2024-02-02 DIAGNOSIS — N39.0 URINARY TRACT INFECTION WITHOUT HEMATURIA, SITE UNSPECIFIED: ICD-10-CM

## 2024-02-02 DIAGNOSIS — D50.0 IRON DEFICIENCY ANEMIA DUE TO CHRONIC BLOOD LOSS: Primary | ICD-10-CM

## 2024-02-02 DIAGNOSIS — I10 HYPERTENSION, UNSPECIFIED TYPE: ICD-10-CM

## 2024-02-02 DIAGNOSIS — R35.0 URINARY FREQUENCY: ICD-10-CM

## 2024-02-02 DIAGNOSIS — R19.7 DIARRHEA, UNSPECIFIED TYPE: ICD-10-CM

## 2024-02-02 LAB
ADENOVIRUS PCR:: NOT DETECTED
ALBUMIN SERPL-MCNC: 3.4 G/DL (ref 3.2–4.8)
ALBUMIN/GLOB SERPL: 0.9 {RATIO} (ref 1–2)
ALP LIVER SERPL-CCNC: 76 U/L
ALT SERPL-CCNC: 9 U/L
ANION GAP SERPL CALC-SCNC: 9 MMOL/L (ref 0–18)
AST SERPL-CCNC: 23 U/L (ref ?–34)
B PARAPERT DNA SPEC QL NAA+PROBE: NOT DETECTED
B PERT DNA SPEC QL NAA+PROBE: NOT DETECTED
BASOPHILS # BLD AUTO: 0.04 X10(3) UL (ref 0–0.2)
BASOPHILS NFR BLD AUTO: 0.3 %
BILIRUB SERPL-MCNC: 0.3 MG/DL (ref 0.2–1.1)
BILIRUB UR QL: NEGATIVE
BNP SERPL-MCNC: 157 PG/ML
BUN BLD-MCNC: 16 MG/DL (ref 9–23)
BUN/CREAT SERPL: 20.3 (ref 10–20)
C PNEUM DNA SPEC QL NAA+PROBE: NOT DETECTED
CALCIUM BLD-MCNC: 9.4 MG/DL (ref 8.7–10.4)
CHLORIDE SERPL-SCNC: 103 MMOL/L (ref 98–112)
CO2 SERPL-SCNC: 29 MMOL/L (ref 21–32)
COLOR UR: YELLOW
CORONAVIRUS 229E PCR:: NOT DETECTED
CORONAVIRUS HKU1 PCR:: NOT DETECTED
CORONAVIRUS NL63 PCR:: NOT DETECTED
CORONAVIRUS OC43 PCR:: NOT DETECTED
CREAT BLD-MCNC: 0.79 MG/DL
DEPRECATED HBV CORE AB SER IA-ACNC: 59.2 NG/ML
DEPRECATED RDW RBC AUTO: 52.4 FL (ref 35.1–46.3)
EGFRCR SERPLBLD CKD-EPI 2021: 74 ML/MIN/1.73M2 (ref 60–?)
EOSINOPHIL # BLD AUTO: 0.19 X10(3) UL (ref 0–0.7)
EOSINOPHIL NFR BLD AUTO: 1.6 %
ERYTHROCYTE [DISTWIDTH] IN BLOOD BY AUTOMATED COUNT: 16.3 % (ref 11–15)
EST. AVERAGE GLUCOSE BLD GHB EST-MCNC: 226 MG/DL (ref 68–126)
FASTING STATUS PATIENT QL REPORTED: YES
FLUAV RNA SPEC QL NAA+PROBE: NOT DETECTED
FLUBV RNA SPEC QL NAA+PROBE: NOT DETECTED
GLOBULIN PLAS-MCNC: 3.6 G/DL (ref 2.8–4.4)
GLUCOSE BLD-MCNC: 178 MG/DL (ref 70–99)
GLUCOSE BLDC GLUCOMTR-MCNC: 201 MG/DL (ref 70–99)
GLUCOSE BLDC GLUCOMTR-MCNC: 230 MG/DL (ref 70–99)
GLUCOSE UR-MCNC: NORMAL MG/DL
HBA1C MFR BLD: 9.5 % (ref ?–5.7)
HCT VFR BLD AUTO: 34.4 %
HGB BLD-MCNC: 10.9 G/DL
IMM GRANULOCYTES # BLD AUTO: 0.06 X10(3) UL (ref 0–1)
IMM GRANULOCYTES NFR BLD: 0.5 %
IRON SATN MFR SERPL: 14 %
IRON SERPL-MCNC: 33 UG/DL
KETONES UR-MCNC: NEGATIVE MG/DL
LEUKOCYTE ESTERASE UR QL STRIP.AUTO: 500
LYMPHOCYTES # BLD AUTO: 2.15 X10(3) UL (ref 1–4)
LYMPHOCYTES NFR BLD AUTO: 18 %
MCH RBC QN AUTO: 28.1 PG (ref 26–34)
MCHC RBC AUTO-ENTMCNC: 31.7 G/DL (ref 31–37)
MCV RBC AUTO: 88.7 FL
METAPNEUMOVIRUS PCR:: NOT DETECTED
MONOCYTES # BLD AUTO: 0.78 X10(3) UL (ref 0.1–1)
MONOCYTES NFR BLD AUTO: 6.5 %
MYCOPLASMA PNEUMONIA PCR:: NOT DETECTED
NEUTROPHILS # BLD AUTO: 8.7 X10 (3) UL (ref 1.5–7.7)
NEUTROPHILS # BLD AUTO: 8.7 X10(3) UL (ref 1.5–7.7)
NEUTROPHILS NFR BLD AUTO: 73.1 %
NITRITE UR QL STRIP.AUTO: NEGATIVE
OSMOLALITY SERPL CALC.SUM OF ELEC: 298 MOSM/KG (ref 275–295)
PARAINFLUENZA 1 PCR:: NOT DETECTED
PARAINFLUENZA 2 PCR:: NOT DETECTED
PARAINFLUENZA 3 PCR:: NOT DETECTED
PARAINFLUENZA 4 PCR:: NOT DETECTED
PH UR: 6 [PH] (ref 5–8)
PLATELET # BLD AUTO: 310 10(3)UL (ref 150–450)
POTASSIUM SERPL-SCNC: 2.6 MMOL/L (ref 3.5–5.1)
POTASSIUM SERPL-SCNC: 3.3 MMOL/L (ref 3.5–5.1)
PROCALCITONIN SERPL-MCNC: 0.17 NG/ML (ref ?–0.05)
PROT SERPL-MCNC: 7 G/DL (ref 5.7–8.2)
PROT UR-MCNC: 300 MG/DL
RBC # BLD AUTO: 3.88 X10(6)UL
RBC #/AREA URNS AUTO: >10 /HPF
RHINOVIRUS/ENTERO PCR:: NOT DETECTED
RSV RNA SPEC QL NAA+PROBE: NOT DETECTED
SARS-COV-2 RNA NPH QL NAA+NON-PROBE: NOT DETECTED
SODIUM SERPL-SCNC: 141 MMOL/L (ref 136–145)
SP GR UR STRIP: 1.01 (ref 1–1.03)
TIBC SERPL-MCNC: 243 UG/DL (ref 250–425)
TRANSFERRIN SERPL-MCNC: 163 MG/DL (ref 250–380)
TROPONIN I SERPL HS-MCNC: 19 NG/L
TROPONIN I SERPL HS-MCNC: 20 NG/L
TROPONIN I SERPL HS-MCNC: 20 NG/L
UROBILINOGEN UR STRIP-ACNC: NORMAL
WBC # BLD AUTO: 11.9 X10(3) UL (ref 4–11)
WBC #/AREA URNS AUTO: >50 /HPF

## 2024-02-02 PROCEDURE — 82962 GLUCOSE BLOOD TEST: CPT

## 2024-02-02 PROCEDURE — 99285 EMERGENCY DEPT VISIT HI MDM: CPT

## 2024-02-02 PROCEDURE — 84145 PROCALCITONIN (PCT): CPT | Performed by: STUDENT IN AN ORGANIZED HEALTH CARE EDUCATION/TRAINING PROGRAM

## 2024-02-02 PROCEDURE — 84484 ASSAY OF TROPONIN QUANT: CPT | Performed by: STUDENT IN AN ORGANIZED HEALTH CARE EDUCATION/TRAINING PROGRAM

## 2024-02-02 PROCEDURE — 83540 ASSAY OF IRON: CPT

## 2024-02-02 PROCEDURE — 84466 ASSAY OF TRANSFERRIN: CPT

## 2024-02-02 PROCEDURE — 93010 ELECTROCARDIOGRAM REPORT: CPT

## 2024-02-02 PROCEDURE — 80053 COMPREHEN METABOLIC PANEL: CPT | Performed by: NURSE PRACTITIONER

## 2024-02-02 PROCEDURE — 93971 EXTREMITY STUDY: CPT | Performed by: STUDENT IN AN ORGANIZED HEALTH CARE EDUCATION/TRAINING PROGRAM

## 2024-02-02 PROCEDURE — 0202U NFCT DS 22 TRGT SARS-COV-2: CPT | Performed by: INTERNAL MEDICINE

## 2024-02-02 PROCEDURE — 81001 URINALYSIS AUTO W/SCOPE: CPT | Performed by: STUDENT IN AN ORGANIZED HEALTH CARE EDUCATION/TRAINING PROGRAM

## 2024-02-02 PROCEDURE — 93005 ELECTROCARDIOGRAM TRACING: CPT

## 2024-02-02 PROCEDURE — 84132 ASSAY OF SERUM POTASSIUM: CPT | Performed by: INTERNAL MEDICINE

## 2024-02-02 PROCEDURE — 83036 HEMOGLOBIN GLYCOSYLATED A1C: CPT | Performed by: INTERNAL MEDICINE

## 2024-02-02 PROCEDURE — 83880 ASSAY OF NATRIURETIC PEPTIDE: CPT | Performed by: STUDENT IN AN ORGANIZED HEALTH CARE EDUCATION/TRAINING PROGRAM

## 2024-02-02 PROCEDURE — 96366 THER/PROPH/DIAG IV INF ADDON: CPT

## 2024-02-02 PROCEDURE — 96365 THER/PROPH/DIAG IV INF INIT: CPT

## 2024-02-02 PROCEDURE — 96375 TX/PRO/DX INJ NEW DRUG ADDON: CPT

## 2024-02-02 PROCEDURE — 96368 THER/DIAG CONCURRENT INF: CPT

## 2024-02-02 PROCEDURE — 84484 ASSAY OF TROPONIN QUANT: CPT | Performed by: INTERNAL MEDICINE

## 2024-02-02 PROCEDURE — 36591 DRAW BLOOD OFF VENOUS DEVICE: CPT

## 2024-02-02 PROCEDURE — 85025 COMPLETE CBC W/AUTO DIFF WBC: CPT

## 2024-02-02 PROCEDURE — 87040 BLOOD CULTURE FOR BACTERIA: CPT | Performed by: STUDENT IN AN ORGANIZED HEALTH CARE EDUCATION/TRAINING PROGRAM

## 2024-02-02 PROCEDURE — 82728 ASSAY OF FERRITIN: CPT

## 2024-02-02 PROCEDURE — 71045 X-RAY EXAM CHEST 1 VIEW: CPT | Performed by: STUDENT IN AN ORGANIZED HEALTH CARE EDUCATION/TRAINING PROGRAM

## 2024-02-02 PROCEDURE — 99215 OFFICE O/P EST HI 40 MIN: CPT | Performed by: INTERNAL MEDICINE

## 2024-02-02 RX ORDER — LEFLUNOMIDE 20 MG/1
20 TABLET ORAL DAILY
Status: DISCONTINUED | OUTPATIENT
Start: 2024-02-02 | End: 2024-02-02

## 2024-02-02 RX ORDER — POLYETHYLENE GLYCOL 3350 17 G/17G
17 POWDER, FOR SOLUTION ORAL DAILY PRN
Status: DISCONTINUED | OUTPATIENT
Start: 2024-02-02 | End: 2024-02-04

## 2024-02-02 RX ORDER — FLUCONAZOLE 150 MG/1
150 TABLET ORAL ONCE
Status: COMPLETED | OUTPATIENT
Start: 2024-02-02 | End: 2024-02-02

## 2024-02-02 RX ORDER — NICOTINE POLACRILEX 4 MG
30 LOZENGE BUCCAL
Status: DISCONTINUED | OUTPATIENT
Start: 2024-02-02 | End: 2024-02-04

## 2024-02-02 RX ORDER — ROSUVASTATIN CALCIUM 20 MG/1
20 TABLET, COATED ORAL NIGHTLY
Status: DISCONTINUED | OUTPATIENT
Start: 2024-02-02 | End: 2024-02-04

## 2024-02-02 RX ORDER — FLUCONAZOLE 150 MG/1
150 TABLET ORAL ONCE
Status: DISCONTINUED | OUTPATIENT
Start: 2024-02-02 | End: 2024-02-03 | Stop reason: ALTCHOICE

## 2024-02-02 RX ORDER — SPIRONOLACTONE 25 MG/1
50 TABLET ORAL DAILY
Status: DISCONTINUED | OUTPATIENT
Start: 2024-02-02 | End: 2024-02-04

## 2024-02-02 RX ORDER — CARVEDILOL 25 MG/1
25 TABLET ORAL 2 TIMES DAILY WITH MEALS
Status: DISCONTINUED | OUTPATIENT
Start: 2024-02-02 | End: 2024-02-04

## 2024-02-02 RX ORDER — HEPARIN SODIUM (PORCINE) LOCK FLUSH IV SOLN 100 UNIT/ML 100 UNIT/ML
5 SOLUTION INTRAVENOUS ONCE
Status: COMPLETED | OUTPATIENT
Start: 2024-02-02 | End: 2024-02-02

## 2024-02-02 RX ORDER — PANTOPRAZOLE SODIUM 20 MG/1
20 TABLET, DELAYED RELEASE ORAL
Status: DISCONTINUED | OUTPATIENT
Start: 2024-02-03 | End: 2024-02-04

## 2024-02-02 RX ORDER — ENEMA 19; 7 G/133ML; G/133ML
1 ENEMA RECTAL ONCE AS NEEDED
Status: DISCONTINUED | OUTPATIENT
Start: 2024-02-02 | End: 2024-02-04

## 2024-02-02 RX ORDER — HYDROXYCHLOROQUINE SULFATE 200 MG/1
200 TABLET, FILM COATED ORAL DAILY
Status: DISCONTINUED | OUTPATIENT
Start: 2024-02-02 | End: 2024-02-04

## 2024-02-02 RX ORDER — NICOTINE POLACRILEX 4 MG
15 LOZENGE BUCCAL
Status: DISCONTINUED | OUTPATIENT
Start: 2024-02-02 | End: 2024-02-04

## 2024-02-02 RX ORDER — HEPARIN SODIUM (PORCINE) LOCK FLUSH IV SOLN 100 UNIT/ML 100 UNIT/ML
5 SOLUTION INTRAVENOUS ONCE
OUTPATIENT
Start: 2024-02-02

## 2024-02-02 RX ORDER — SENNOSIDES 8.6 MG
17.2 TABLET ORAL NIGHTLY PRN
Status: DISCONTINUED | OUTPATIENT
Start: 2024-02-02 | End: 2024-02-04

## 2024-02-02 RX ORDER — NIFEDIPINE 60 MG/1
60 TABLET, EXTENDED RELEASE ORAL DAILY
Status: DISCONTINUED | OUTPATIENT
Start: 2024-02-02 | End: 2024-02-04

## 2024-02-02 RX ORDER — CETIRIZINE HYDROCHLORIDE 10 MG/1
10 TABLET ORAL DAILY
Status: DISCONTINUED | OUTPATIENT
Start: 2024-02-02 | End: 2024-02-04

## 2024-02-02 RX ORDER — TRAMADOL HYDROCHLORIDE 50 MG/1
50 TABLET ORAL EVERY 12 HOURS PRN
Status: DISCONTINUED | OUTPATIENT
Start: 2024-02-02 | End: 2024-02-04

## 2024-02-02 RX ORDER — BISACODYL 10 MG
10 SUPPOSITORY, RECTAL RECTAL
Status: DISCONTINUED | OUTPATIENT
Start: 2024-02-02 | End: 2024-02-04

## 2024-02-02 RX ORDER — LOSARTAN POTASSIUM 50 MG/1
50 TABLET ORAL DAILY
Status: DISCONTINUED | OUTPATIENT
Start: 2024-02-02 | End: 2024-02-04

## 2024-02-02 RX ORDER — CYCLOBENZAPRINE HCL 10 MG
10 TABLET ORAL NIGHTLY PRN
Status: DISCONTINUED | OUTPATIENT
Start: 2024-02-02 | End: 2024-02-04

## 2024-02-02 RX ORDER — MONTELUKAST SODIUM 10 MG/1
10 TABLET ORAL NIGHTLY
Status: DISCONTINUED | OUTPATIENT
Start: 2024-02-02 | End: 2024-02-04

## 2024-02-02 RX ORDER — ALBUTEROL SULFATE 90 UG/1
1 AEROSOL, METERED RESPIRATORY (INHALATION) EVERY 4 HOURS PRN
Status: DISCONTINUED | OUTPATIENT
Start: 2024-02-02 | End: 2024-02-04

## 2024-02-02 RX ORDER — MAGNESIUM SULFATE HEPTAHYDRATE 40 MG/ML
2 INJECTION, SOLUTION INTRAVENOUS ONCE
Status: COMPLETED | OUTPATIENT
Start: 2024-02-02 | End: 2024-02-02

## 2024-02-02 RX ORDER — LOSARTAN POTASSIUM 100 MG/1
100 TABLET ORAL DAILY
Status: DISCONTINUED | OUTPATIENT
Start: 2024-02-02 | End: 2024-02-02

## 2024-02-02 RX ORDER — ACETAMINOPHEN 500 MG
500 TABLET ORAL EVERY 6 HOURS PRN
Status: DISCONTINUED | OUTPATIENT
Start: 2024-02-02 | End: 2024-02-04

## 2024-02-02 RX ORDER — DEXTROSE MONOHYDRATE 25 G/50ML
50 INJECTION, SOLUTION INTRAVENOUS
Status: DISCONTINUED | OUTPATIENT
Start: 2024-02-02 | End: 2024-02-04

## 2024-02-02 RX ORDER — SODIUM CHLORIDE 9 MG/ML
10 INJECTION, SOLUTION INTRAMUSCULAR; INTRAVENOUS; SUBCUTANEOUS ONCE
OUTPATIENT
Start: 2024-02-02

## 2024-02-02 RX ORDER — FLUTICASONE FUROATE AND VILANTEROL 200; 25 UG/1; UG/1
1 POWDER RESPIRATORY (INHALATION) DAILY
Status: DISCONTINUED | OUTPATIENT
Start: 2024-02-02 | End: 2024-02-04

## 2024-02-02 RX ORDER — METOCLOPRAMIDE HYDROCHLORIDE 5 MG/ML
10 INJECTION INTRAMUSCULAR; INTRAVENOUS EVERY 8 HOURS PRN
Status: DISCONTINUED | OUTPATIENT
Start: 2024-02-02 | End: 2024-02-04

## 2024-02-02 RX ORDER — NITROGLYCERIN 0.4 MG/1
0.4 TABLET SUBLINGUAL EVERY 5 MIN PRN
Status: DISCONTINUED | OUTPATIENT
Start: 2024-02-02 | End: 2024-02-04

## 2024-02-02 RX ORDER — POTASSIUM CHLORIDE 20 MEQ/1
40 TABLET, EXTENDED RELEASE ORAL ONCE
Status: COMPLETED | OUTPATIENT
Start: 2024-02-02 | End: 2024-02-02

## 2024-02-02 RX ORDER — PANTOPRAZOLE SODIUM 40 MG/1
40 TABLET, DELAYED RELEASE ORAL
Status: DISCONTINUED | OUTPATIENT
Start: 2024-02-02 | End: 2024-02-02 | Stop reason: ALTCHOICE

## 2024-02-02 RX ORDER — MELATONIN
3 NIGHTLY PRN
Status: DISCONTINUED | OUTPATIENT
Start: 2024-02-02 | End: 2024-02-04

## 2024-02-02 RX ORDER — FLUCONAZOLE 150 MG/1
150 TABLET ORAL EVERY 24 HOURS
Status: DISCONTINUED | OUTPATIENT
Start: 2024-02-02 | End: 2024-02-02

## 2024-02-02 RX ORDER — ONDANSETRON 2 MG/ML
4 INJECTION INTRAMUSCULAR; INTRAVENOUS EVERY 6 HOURS PRN
Status: DISCONTINUED | OUTPATIENT
Start: 2024-02-02 | End: 2024-02-04

## 2024-02-02 RX ORDER — PREDNISONE 5 MG/1
5 TABLET ORAL DAILY
Status: DISCONTINUED | OUTPATIENT
Start: 2024-02-02 | End: 2024-02-04

## 2024-02-02 RX ORDER — CLONAZEPAM 0.5 MG/1
0.5 TABLET ORAL 2 TIMES DAILY PRN
Status: DISCONTINUED | OUTPATIENT
Start: 2024-02-02 | End: 2024-02-04

## 2024-02-02 RX ADMIN — HEPARIN SODIUM (PORCINE) LOCK FLUSH IV SOLN 100 UNIT/ML 500 UNITS: 100 SOLUTION INTRAVENOUS at 12:15:00

## 2024-02-02 NOTE — ED INITIAL ASSESSMENT (HPI)
Pt ambulatory through triage w/daughter c/o abnormal labs. K 2.6 Pt endorses SOB, denies CP. PT states yesterday she felt like her heart was \"fluttering\".

## 2024-02-02 NOTE — TELEPHONE ENCOUNTER
Per chart review - patient in ER:  Patient here for follow up visit with Dr. Mohamud. He advised ER evaluation for hypokalemia and concern for UTI. Dr. Mohamud called ER triage for report. Patient transported to ER via wheelchair and accompanied by her daughter.

## 2024-02-02 NOTE — ED QUICK NOTES
3 attempts made before obtaining both sets of blood cultures successfully. 1st attempt was by RN then 2 ED techs, then has US IV placed and second/final set drawn from IV. Pt then transported up to unit by EDT, in no acute distress, IV antibiotic given per MAR.     1st set of blood culture drawn off of Port per Dr. Robert approval.

## 2024-02-02 NOTE — TELEPHONE ENCOUNTER
JANI Fabian,   Spoke to lab - patient has critically low K+:  Component      Latest Ref Rng 2/2/2024   Glucose      70 - 99 mg/dL 178 (H)    Sodium      136 - 145 mmol/L 141    Potassium      3.5 - 5.1 mmol/L 2.6 (LL)    Chloride      98 - 112 mmol/L 103    Carbon Dioxide, Total      21.0 - 32.0 mmol/L 29.0    ANION GAP      0 - 18 mmol/L 9    BUN      9 - 23 mg/dL 16    CREATININE      0.55 - 1.02 mg/dL 0.79    BUN/CREATININE RATIO      10.0 - 20.0  20.3 (H)    CALCIUM      8.7 - 10.4 mg/dL 9.4    CALCULATED OSMOLALITY      275 - 295 mOsm/kg 298 (H)    ALT (SGPT)      10 - 49 U/L 9 (L)    AST (SGOT)      <=34 U/L 23    ALKALINE PHOSPHATASE      55 - 142 U/L 76    Total Bilirubin      0.2 - 1.1 mg/dL 0.3    PROTEIN, TOTAL      5.7 - 8.2 g/dL 7.0    Albumin      3.2 - 4.8 g/dL 3.4    Globulin      2.8 - 4.4 g/dL 3.6    A/G Ratio      1.0 - 2.0  0.9 (L)    Patient Fasting for CMP? Yes    EGFR      >=60 mL/min/1.73m2 74       Legend:  (H) High  (LL) Low Panic  (L) Low    Please advise -thanks

## 2024-02-02 NOTE — PROGRESS NOTES
Cancer Center Progress Note    Patient Name: Chester Bautista   YOB: 1940   Medical Record Number: [id number]     Chief Complaint:  BUZZ, IV iron  Breast cancer    Oncology History:  83 year old with chronic iron deficiency anemia in the setting of bleeding AVMs s/p repeat endoscopic treatment, Iv iron treatment and recent RBC transfusion for hgb of 6 on 5/2022. EGD/CLN Dr Garcia 7/2022 noted for 2 AVM in duodenum and 1 AVM in the colon s/p treatment.  There is ongoing concern for more AVMs as well need for chronic monitoring, infusions etc.      History is noted for RA managed by Dr Goodrich, hepatitis B core antibody positivity, and remote history of breast cancer (records not available).     Pt had an infusion 2/2023 injectafer.     5/19/23: reported to ED due to rectal bleeding and abd pain, has CLN planned.    2/2/2024  Interim  Patient presents for f/u of BUZZ s/p receiving some IV iron in December. Prudencio has symptoms of fatigue, but no chest pain or dyspnea. Patient denies blood loss from any orifice. Prudencio underwent bidirectional scoping with Dr. Aundrea Garcia in GI in June 2023.  Several areas of AVM were cauterized. She mentions plans for consideration of capsule endoscopy planned with Dr. Garcia.    Today, re: her elevated BP, pt denies any lightheadedness or palpitations. She took all 3 of her anti-HTN agents. She has some leukocytosis with neutrophilia and endorses diarrhea and dysuria symptoms and vaginal itching symptoms. She and her daughter mention concerns for a yeast infection.      Past Medical History: history of breast cancer, BUZZ, bleeding AVM, diabetes, HTN, HLD, RA  Past Surgical History: Hysterectomy 1972, IR aneurysm repair, right lumpectomy, knee replacements  Family History: neg for malignancy  Social History: lives with naif, able to do all ADLs, no smoking or ETOH    Current Outpatient Medications:     Glucose Blood (ACCU-CHEK GUIDE) In Vitro Strip, Check blood  sugar twice a day (fasting and before dinner). DX: E11.42, NIDDM, Disp: 200 strip, Rfl: 1    rosuvastatin (CRESTOR) 20 MG Oral Tab, Take 1 tablet (20 mg total) by mouth nightly., Disp: 90 tablet, Rfl: 0    omeprazole 20 MG Oral Capsule Delayed Release, Take 1 capsule (20 mg total) by mouth every morning., Disp: 90 capsule, Rfl: 1    cyclobenzaprine 10 MG Oral Tab, Take 1 tablet (10 mg total) by mouth nightly as needed for Muscle spasms., Disp: 90 tablet, Rfl: 1    glipiZIDE 5 MG Oral Tab, Take 2 tablets (10 mg total) by mouth daily with breakfast AND 0.5 tablets (2.5 mg total) daily with dinner., Disp: 225 tablet, Rfl: 0    glycopyrrolate 1 MG Oral Tab, Take 1 tablet (1 mg total) by mouth 3 (three) times daily., Disp: 90 tablet, Rfl: 1    albuterol 108 (90 Base) MCG/ACT Inhalation Aero Soln, inhale 2 puff by inhalation route  every 4 - 6 hours as needed, Disp: 1 each, Rfl: 0    hydroxychloroquine 200 MG Oral Tab, Take 1 tablet (200 mg total) by mouth daily., Disp: 90 tablet, Rfl: 1    montelukast 10 MG Oral Tab, Take 1 tablet (10 mg total) by mouth nightly., Disp: 90 tablet, Rfl: 3    LORATADINE 10 MG Oral Tab, TAKE 1 TABLET BY MOUTH EVERY DAY, Disp: 90 tablet, Rfl: 1    carvedilol 25 MG Oral Tab, Take 1 tablet (25 mg total) by mouth 2 (two) times daily with meals., Disp: 180 tablet, Rfl: 3    valsartan 80 MG Oral Tab, Take 1 tablet (80 mg total) by mouth daily., Disp: 90 tablet, Rfl: 3    spironolactone 25 MG Oral Tab, Take 2 tablets (50 mg total) by mouth daily., Disp: 180 tablet, Rfl: 1    fluticasone-salmeterol (WIXELA INHUB) 250-50 MCG/ACT Inhalation Aerosol Powder, Breath Activated, Inhale 1 puff into the lungs every 12 (twelve) hours., Disp: 1 each, Rfl: 5    albuterol 108 (90 Base) MCG/ACT Inhalation Aero Soln, inhale 2 puff by inhalation route  every 4 - 6 hours as needed, Disp: 1 each, Rfl: 5    traMADol 50 MG Oral Tab, Take 1 tablet (50 mg total) by mouth every 12 (twelve) hours as needed for Pain., Disp:  20 tablet, Rfl: 0    Accu-Chek FastClix Lancets Does not apply Misc, Check sugar once daily as directed, Disp: 100 each, Rfl: 2    Blood Glucose Monitoring Suppl (ONETOUCH VERIO) w/Device Does not apply Kit, 1 each daily. Test 1x daily, Disp: 1 kit, Rfl: 0    Glucose Blood (ONETOUCH VERIO) In Vitro Strip, Test 1x daily, Disp: 100 strip, Rfl: 1    OneTouch Delica Lancets 33G Does not apply Misc, 4 x daily, Disp: 100 each, Rfl: 1    Blood Glucose Monitoring Suppl (BLOOD GLUCOSE SYSTEM GILLES) Does not apply Kit, DX E 11.9.  Checks every morning., Disp: 1 kit, Rfl: 0    Lancets Does not apply Misc, DX E 11.9.  Checks every morning., Disp: 50 each, Rfl: 11    Blood Glucose Monitoring Suppl w/Device Does not apply Kit, DX E 11.9.  Checks every morning., Disp: 1 kit, Rfl: 0    Blood Gluc Meter Disp-Strips Does not apply Device, DX E 11.9.  Checks every morning., Disp: 100 each, Rfl: 11    acetaminophen 500 MG Oral Tab, Take 1 tablet (500 mg total) by mouth every 6 (six) hours as needed for Pain., Disp: , Rfl:     clonazePAM 0.5 MG Oral Tab, Take 1 tablet (0.5 mg total) by mouth 2 (two) times daily as needed for Anxiety. Patient is taking one tablet by mouth every night at bedtime as needed for anxiety., Disp: , Rfl:     Nystatin Does not apply Powder, 1 Application daily. Apply over cream to rash for 4 weeks and after that just use this. (Patient not taking: Reported on 2/2/2024), Disp: 1 each, Rfl: 5    clotrimazole-betamethasone 1-0.05 % External Cream, Apply 1 Application topically 2 (two) times daily. Apply on the rash and a centimeter beyond rash on dry skin and a light layer.  Do that for 4 weeks.  If no better in 2 weeks call.  If better continue for 4 weeks with powder over. (Patient not taking: Reported on 2/2/2024), Disp: 60 g, Rfl: 3    predniSONE 5 MG Oral Tab, Take 1 tablet (5 mg total) by mouth daily. (Patient not taking: Reported on 2/2/2024), Disp: 90 tablet, Rfl: 1    leflunomide 20 MG Oral Tab, Take 1  tablet (20 mg total) by mouth daily. (Patient not taking: Reported on 2/2/2024), Disp: , Rfl:     BIOTIN OR, Take by mouth. (Patient not taking: Reported on 12/21/2023), Disp: , Rfl:     methylPREDNISolone 4 MG Oral Tablet Therapy Pack, Take as directed on package. (Patient not taking: Reported on 12/21/2023), Disp: 1 each, Rfl: 0    predniSONE 10 MG Oral Tab, Take 30 mg for 2 days then 20 mg for 2 days then 10 mg for one day and after that continue with your regular (Patient not taking: Reported on 12/21/2023), Disp: 11 tablet, Rfl: 0    aspirin 81 MG Oral Tab EC, Take 325 mg by mouth daily. (Patient not taking: Reported on 11/3/2023), Disp: , Rfl:     Allergies:  Allergies   Allergen Reactions    Celebrex [Celecoxib] PALPITATIONS    Penicillins ITCHING and SWELLING    Metformin And Related UNKNOWN    Olmesartan UNKNOWN        Review of Systems:Oncology specific ROS negative except as per HPI    BP (!) 177/75 (BP Location: Left arm, Patient Position: Sitting, Cuff Size: adult)   Pulse 105   Temp 98.3 °F (36.8 °C) (Oral)   Resp 16   Ht 1.549 m (5' 1\")   Wt 65.9 kg (145 lb 3.2 oz)   SpO2 97%   BMI 27.44 kg/m²   Wt Readings from Last 6 Encounters:   02/02/24 65.9 kg (145 lb 3.2 oz)   12/21/23 66.7 kg (147 lb)   12/14/23 66.7 kg (147 lb)   12/07/23 66.9 kg (147 lb 8 oz)   12/01/23 66.7 kg (147 lb)   11/24/23 67.1 kg (148 lb)   General: Patient is alert and oriented x 3, not in acute distress.  HEENT: EOMI  Chest: Clear to auscultation bilaterally.  Abdomen: soft, non tender, non distended  Extremities: no signs of LE edema  Neurological: Strength is grossly intact. Moving all extremities. Gait appropriate.   Psych/Depression: Appropriate mood and affect          Lab Results   Component Value Date    WBC 11.9 (H) 02/02/2024    RBC 3.88 02/02/2024    HGB 10.9 (L) 02/02/2024    HCT 34.4 (L) 02/02/2024    MCV 88.7 02/02/2024    MCH 28.1 02/02/2024    MCHC 31.7 02/02/2024    RDW 16.3 (H) 02/02/2024    .0  02/02/2024     Lab Results   Component Value Date     (H) 02/02/2024    BUN 16 02/02/2024    BUNCREA 20.3 (H) 02/02/2024    CREATSERUM 0.79 02/02/2024    ANIONGAP 9 02/02/2024    GFRNAA 47 (L) 05/02/2022    GFRAA 54 (L) 05/02/2022    CA 9.4 02/02/2024    OSMOCALC 298 (H) 02/02/2024    ALKPHO 76 02/02/2024    AST 23 02/02/2024    ALT 9 (L) 02/02/2024    BILT 0.3 02/02/2024    TP 7.0 02/02/2024    ALB 3.4 02/02/2024    GLOBULIN 3.6 02/02/2024     02/02/2024    K 2.6 (LL) 02/02/2024     02/02/2024    CO2 29.0 02/02/2024     Iron studies and CBC+diff reviewed      Impression and Plan:  83 year old with chronic iron deficiency anemia in the setting of bleeding AVMs s/p repeat endoscopic treatment, Iv iron treatment and rbc tfx 5/2022    1.) BUZZ from AVM's  - pt's labs show that she needs some more IV iron soon as her serum iron and iron sat are low, will be planning more venofer x 3 doses and f/u in 8 weeks    - previously CAPUTO has been a hallmark for symptomatic anemia for her, also has worsening fatigue and some lightheadedness    -Continue PRN iron infusions to keep iron sat and ferritin levels in normal range.     -Will try to keep hgb above 9 if possible, hopefully can avoid octreotide although can be considered if she has repeat admissions or blood transfusions due to symptomatic acute blood loss anemia    2.) Hypokalemia  -suspect this is from diarrhea that she endorses today; needs replacement with 40 meqKCL oral and IV in the ED    3.) Leukocytosis with Neutrophilia  --likely from the diarrhea above or UTI or the yeast infection as the pt reports some vaginal itching symptoms, sent to the ED for further work up and may be admitted based on her advanced age    4.) HTN  --elevated BP values on 3 anti-HTN agents; denies any lightheadedness or palpations, this needs tighter control  --may need to consider a Nephrology consultation for uncontrolled HTN on 3 agents    MDM: High Risk; ongoing  potential GI bleeding from AVM's, Hypokalemia, HTN, diarrhea    Eric Mohamud MD  [facility]  [address]. [facility], [place] [address]

## 2024-02-02 NOTE — CONSULTS
[facility] - CARDIOLOGY CONSULT NOTE    Chester Bautista Patient Status:  Emergency    1940 MRN [id number]   Location [facility] EMERGENCY DEPARTMENT Attending Hugh Robert MD   Hosp Day # 0 PCP Heike Sorto MD     Date of Admission:  2024  Date of Consult:  2024  I was asked by Hugh Robert MD to provide recommendations for evaluation and management of cardiac issues.  Impression:   83-year-old female history of hypertension, hyperlipidemia, COPD, diabetes chronic iron deficiency anemia now with chest pain.      Recommendations:    1.  Chest pain  Atypical EKG no high risk features identified.  Check 2D echocardiogram with Doppler.  Risk factors include hypertension hyperlipidemia and family history premature CAD.  If echocardiogram is normal recommend outpatient ischemic evaluation.  2.  Hypokalemia  2.6 potassium started on IV replacement.  3.  Vaginal pruritus  Suspected yeast infection management as per primary team.  4.  Iron deficiency anemia  Management as per hematology.  5.  Shortness of breath  Clinically no evidence of CHF chest x-ray does not show pulmonary edema euvolemic on exam flat JVD low suspicion for volume overload  6.  Hypertension  Refractory on multiple blood pressure pills.  On Coreg 25 mg twice daily, valsartan 80 mg daily and spironolactone 50 mg daily.  Check renal ultrasound to evaluate renal artery stenosis.  Add nifedipine 60 mg daily.      L5 consult, will follow  Reason for Consultation:   Chest pain, hypertension      History of Present Illness:   Patient is a 83 year old female who was admitted to the hospital for several complaints.  Prior history of hypertension, hyperlipidemia, COPD, chronic iron deficiency anemia in the setting of AV malformation repeat endoscopic treatments.  Saw her hematologist today who recommended the patient come to emergency room.  She reports taking 3 blood pressure medications blood pressure has been  elevated at home.  Also had vaginal pruritus sent to ER for workup.  Denies any prior cardiac problems or workup.  Yesterday had intermittent chest pain.  At rest on exertion mostly sedentary just walks around her house.  No syncope or palpitations reported.  No orthopnea or lower extremity edema.      Cardiac tests:    Past Medical History  Past Medical History:   Diagnosis Date    Anxiety state     Cancer (HCC)     breast cancer 2018    Chronic obstructive pulmonary disease, unspecified (HCC) 05/04/2023    Chronic obstructive pulmonary disease, unspecified (HCC) 05/04/2023    COPD (chronic obstructive pulmonary disease) (HCC)     Diabetes (HCC)     Essential hypertension     Exposure to medical diagnostic radiation     High blood pressure     High cholesterol     History of blood transfusion 07/2022    low hemoglobin    Osteoarthritis     PONV (postoperative nausea and vomiting)     Pulmonary emphysema (HCC)     Rheumatoid arthritis (HCC)        Past Surgical History  Past Surgical History:   Procedure Laterality Date    CATARACT EXTRACTION W/  INTRAOCULAR LENS IMPLANT Bilateral 2017    Dr in [facility]     COLONOSCOPY      COLONOSCOPY N/A 07/21/2022    Procedure: COLONOSCOPY;  Surgeon: Mikey Garcia MD;  Location: [facility] ENDOSCOPY    COLONOSCOPY N/A 06/15/2023    Procedure: COLONOSCOPY;  Surgeon: Mikey Garcia MD;  Location: [facility] ENDOSCOPY    CORRECT BUNION,SIMPLE      right foot  1993    EGD  12/21/2023    Dr. Garcia; Duodenal AVM's x4 cauterized    HYSTERECTOMY      1972, no abnormal Paps, due to heavy bleeding with fibroids.  Not sure if it had bilateral salpingo-oophorectomy.    IR ANEURYSM REPAIRM  2010    Computer assisted volumetric craniofomy with clipping of anterior cerebral artery.    LUMPECTOMY RIGHT      RADIATION RIGHT      ROTATOR CUFF REPAIR      1993    TOTAL KNEE REPLACEMENT Right 2010    TOTAL KNEE REPLACEMENT Left 07/02/2015    TRANSORAL INCISIONLESS FUNDOPLICATION - INTERNAL  01/25/2012  Fredy fundoplication at [facility] Dr. Fournier.    Fredy fundoplication at [facility] Dr. Fournier.    TRIGGER FINGER RELEASE      left hand  2005    YAG CAPSULOTOMY - OU - BOTH EYES Bilateral 09/2021    done in [place]       Family History  Family History   Problem Relation Age of Onset    Heart Surgery Mother         Leaky valve at 41 y/o.     Prostate Cancer Brother     Cancer Brother     Diabetes Maternal Grandmother     Diabetes Paternal Aunt     Breast Cancer Self 78    Breast Cancer Niece         before 50    Macular degeneration Neg     Glaucoma Neg        Social History  Pediatric History   Patient Parents    Not on file     Other Topics Concern    Not on file   Social History Narrative    Just moved in from [place].   passed away and that is why moved from [place] to be with daughter who lives in [place].  Currently lives with daughter.         Denies smoking or illicit drug use no alcohol use.    Current Medications:  Current Facility-Administered Medications   Medication Dose Route Frequency    potassium chloride 40 mEq in 250mL sodium chloride 0.9% IVPB premix  40 mEq Intravenous Once    cefTRIAXone (Rocephin) 1 g in D5W 100 mL IVPB-ADD  1 g Intravenous Once     (Not in a hospital admission)      Allergies  Allergies   Allergen Reactions    Celebrex [Celecoxib] PALPITATIONS    Penicillins ITCHING and SWELLING    Metformin And Related UNKNOWN    Olmesartan UNKNOWN       Review of Systems:   10 pt ROS performed, separate from HPI  Review of Systems:  GENERAL: no fevers, +chills, sweats  HEENT: no visual or hearing changes  SKIN: denies any unusual skin lesions or rashes  RESPIRATORY: shortness of breath with exertion  CARDIOVASCULAR: no active chest pain, no claudication  GI: denies abdominal pain and denies heartburn  : no dysuria or hematuria  Positive for vaginal pruritus.  NEURO: denies headaches, focal weaknesses or paresthesias  All other systems  reviewed and negative.  fatigue is present  All other systems were reviewed are negative  Physical Exam:   Blood pressure (!) 168/80, pulse 96, temperature 98 °F (36.7 °C), temperature source Oral, resp. rate 21, height 5' 1\" (1.549 m), weight 145 lb 4.5 oz (65.9 kg), SpO2 91%.    Scheduled Meds:    potassium chloride in sodium chloride 0.9%  40 mEq Intravenous Once    cefTRIAXone  1 g Intravenous Once         Physical Exam:    General: Alert and oriented. No apparent distress. No respiratory or constitutional distress.  Elderly -American female  HEENT: Normocephalic, anicteric sclera, neck supple  Neck: No JVD, carotids 2+, supple  Cardiac: Regular rate. No pathologic murmur.  Lungs: Bibasilar crackles no wheezes.  Normal excursions and effort.  Abdomen: Soft, non-tender. BS-present.  Extremities: Without clubbing, cyanosis.  Peripheral pulsespresent.  Neurologic: Alert and oriented, normal affect. Motor ok.  Skin: Warm and dry.     Results:     Laboratory Data:  Lab Results   Component Value Date    WBC 11.9 (H) 02/02/2024    HGB 10.9 (L) 02/02/2024    HCT 34.4 (L) 02/02/2024    .0 02/02/2024    CREATSERUM 0.79 02/02/2024    BUN 16 02/02/2024     02/02/2024    K 2.6 (LL) 02/02/2024     02/02/2024    CO2 29.0 02/02/2024     (H) 02/02/2024    CA 9.4 02/02/2024    ALB 3.4 02/02/2024    ALKPHO 76 02/02/2024    TP 7.0 02/02/2024    AST 23 02/02/2024    ALT 9 (L) 02/02/2024    PTT 30.8 12/14/2023    INR 0.93 12/14/2023    PTP 13.0 12/14/2023    TSH 0.622 10/20/2023    ESRML 91 (H) 09/14/2023    CRP 1.10 (H) 09/14/2023    B12 748 10/20/2023         Thank you for allowing me to participate in the care of your patient.    Boston Gong MD  [facility]  Interventional Cardiology  2/2/2024

## 2024-02-02 NOTE — ED PROVIDER NOTES
History     Chief Complaint   Patient presents with    Abnormal Result       HPI    83 year old female history of hypertension, diabetes, COPD, chronic iron deficiency anemia in the setting of bleeding AVMs status post repeat endoscopic treatments sent in by hematology for evaluation.  Patient states for the past few days she was feeling weaker, lightheaded and more dyspneic.  She has been getting IV iron therapies, did not get today.  Noted in blood work today that she had low potassium and elevated blood pressure.  Patient states at baseline her blood pressure is 160s to 170 systolic at home, she has been managed by her primary care physician -compliant with all of her medicines.  She also states she has had vaginal pruritus consistent with typical yeast infection, she was using nystatin but excellently dropped arrestive into the toilet and is requesting treatment.  Also endorsing swelling of her right lower extremity for some time now, she does note that she used to hang it off her bed when she would be sitting.  Swelling is better in the morning and worse at the end of the day after standing.  No weakness or paresthesias.        Past Medical History:   Diagnosis Date    Anxiety state     Cancer (HCC)     breast cancer 2018    Chronic obstructive pulmonary disease, unspecified (HCC) 05/04/2023    Chronic obstructive pulmonary disease, unspecified (HCC) 05/04/2023    COPD (chronic obstructive pulmonary disease) (HCC)     Diabetes (HCC)     Essential hypertension     Exposure to medical diagnostic radiation     High blood pressure     High cholesterol     History of blood transfusion 07/2022    low hemoglobin    Osteoarthritis     PONV (postoperative nausea and vomiting)     Pulmonary emphysema (HCC)     Rheumatoid arthritis (HCC)        Past Surgical History:   Procedure Laterality Date    CATARACT EXTRACTION W/  INTRAOCULAR LENS IMPLANT Bilateral 2017     in [facility]     COLONOSCOPY      COLONOSCOPY N/A  2022    Procedure: COLONOSCOPY;  Surgeon: Mikey Garcia MD;  Location: [facility] ENDOSCOPY    COLONOSCOPY N/A 06/15/2023    Procedure: COLONOSCOPY;  Surgeon: Mikey Garcia MD;  Location: [facility] ENDOSCOPY    CORRECT BUNION,SIMPLE      right foot      EGD  2023    Dr. Garcia; Duodenal AVM's x4 cauterized    HYSTERECTOMY      1972, no abnormal Paps, due to heavy bleeding with fibroids.  Not sure if it had bilateral salpingo-oophorectomy.    IR ANEURYSM REPAIRM      Computer assisted volumetric craniofomy with clipping of anterior cerebral artery.    LUMPECTOMY RIGHT      RADIATION RIGHT      ROTATOR CUFF REPAIR      1993    TOTAL KNEE REPLACEMENT Right     TOTAL KNEE REPLACEMENT Left 2015    TRANSORAL INCISIONLESS FUNDOPLICATION - INTERNAL  2012 Fredy fundoplication at [facility] Dr. Fournier.    Fredy fundoplication at [facility] Dr. Fournier.    TRIGGER FINGER RELEASE      left hand      YAG CAPSULOTOMY - OU - BOTH EYES Bilateral 2021    done in [place]       Social History     Socioeconomic History    Marital status:    Tobacco Use    Smoking status: Former     Packs/day: .25     Types: Cigarettes     Quit date:      Years since quittin.1    Smokeless tobacco: Never    Tobacco comments:     [id number]   Vaping Use    Vaping Use: Never used   Substance and Sexual Activity    Alcohol use: Never    Drug use: Never   Social History Narrative    Just moved in from [place].   passed away and that is why moved from [place] to be with daughter who lives in [place].  Currently lives with daughter.                   Physical Exam     ED Triage Vitals [24 1340]   BP (!) 202/91   Pulse 97   Resp 20   Temp 98 °F (36.7 °C)   Temp src Oral   SpO2 92 %   O2 Device None (Room air)       Physical Exam  Constitutional:       General: She is not in acute distress.  Eyes:      Extraocular Movements: Extraocular movements intact.    Cardiovascular:      Rate and Rhythm: Normal rate.      Pulses: Normal pulses.      Heart sounds: Normal heart sounds.   Pulmonary:      Effort: Pulmonary effort is normal. No respiratory distress.      Breath sounds: Rhonchi present.   Musculoskeletal:      Cervical back: Normal range of motion.      Comments: 1+ pitting edema to right lower extremity   Neurological:      General: No focal deficit present.      Mental Status: She is alert.              ED Course     Labs Reviewed   URINALYSIS, ROUTINE - Abnormal; Notable for the following components:       Result Value    Clarity Urine Turbid (*)     Blood Urine 3+ (*)     Protein Urine 300 (*)     Leukocyte Esterase Urine 500 (*)     WBC Urine >50 (*)     RBC Urine >10 (*)     Bacteria Urine 3+ (*)     Squamous Epi. Cells Few (*)     All other components within normal limits   PROCALCITONIN - Abnormal; Notable for the following components:    Procalcitonin 0.17 (*)     All other components within normal limits   TROPONIN I HIGH SENSITIVITY - Normal   BNP (B TYPE NATRIURETIC PEPTIDE)   BLOOD CULTURE   BLOOD CULTURE     US VENOUS DOPPLER LEG RIGHT - DIAG IMG (CPT=93971)    Result Date: 2/2/2024  CONCLUSION: Normal examination.     Dictated by (CST): Reji Bonds MD on 2/02/2024 at 3:15 PM     Finalized by (CST): Reji Bonds MD on 2/02/2024 at 3:15 PM          XR CHEST AP PORTABLE  (CPT=71045)    Result Date: 2/2/2024  CONCLUSION:   Multifocal opacities throughout both lungs, which is concerning for multifocal pneumonia or less likely pulmonary edema.  Recommend follow-up chest radiograph in 4-6 weeks to monitor for resolution.  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     Dictated by (CST): Joe Deleno MD on 2/02/2024 at 3:08 PM     Finalized by (CST): Joe Deleon MD on 2/02/2024 at 3:15 PM                [facility]     Vitals:    02/02/24 1340 02/02/24 1430 02/02/24 1450   BP: (!) 202/91 (!) 168/80    Pulse: 97 96    Resp: 20 21    Temp: 98 °F (36.7 °C)     TempSrc: Oral     SpO2: 92% 92% 91%   Weight: 65.9 kg     Height: 154.9 cm (5' 1\")         Generalized weakness, lightheadedness, fatigue.  Possible dehydration, electro abnormalities.  I reviewed labs done from clinic showing electrolyte abnormalities, will start to replete potassium and will prophylactically give magnesium.  Patient is not anemic at this time worse than her baseline.  Lower extremity swelling may be from venous insufficiency, DVT versus development of heart failure.    ED Course as of 02/02/24 1557  ------------------------------------------------------------  Time: 02/02 1352  Comment: EKG interpretation by me: EKG sinus rhythm at a rate of 99, axis nomal, no concerning acute ischemic ST changes, lvh noted  ------------------------------------------------------------  Time: 02/02 1518  Comment: My interpretation of chest x-ray with diffuse bilateral pulmonary opacities  ------------------------------------------------------------  Time: 02/02 1523  Comment: Given significantly elevated blood pressure, concern for development of heart failure.  Patient has no cough, fever to suggest pneumonia, will send procalcitonin and blood cultures.  It does appear however she has urinary tract infection, will start antibiotics.  ------------------------------------------------------------  Time: 02/02 7452  Comment: Concern for heart failure, given hypokalemia we will hold on Lasix at this time.  Consulted with cardiology.  Admitted for further care         Disposition and Plan     Clinical Impression:  1. Acute pulmonary edema (HCC)    2. Hypertension, unspecified type    3. Hypokalemia    4. Weakness generalized    5. Urinary tract infection without hematuria, site unspecified        Disposition:  There is no disposition on file for this  visit.    Follow-up:  No follow-up provider specified.    Medications Prescribed:  Current Discharge Medication List

## 2024-02-02 NOTE — PROGRESS NOTES
Patient here for follow up visit with Dr. Mohamud. He advised ER evaluation for hypokalemia and concern for UTI. Dr. Mohamud called ER triage for report. Patient transported to ER via wheelchair and accompanied by her daughter.

## 2024-02-02 NOTE — TELEPHONE ENCOUNTER
Labs reviewed. Please inform patient that she will need to go to ER for potassium replacement as low potassium can cause cardiac arrhythmias.     Thank you

## 2024-02-03 ENCOUNTER — APPOINTMENT (OUTPATIENT)
Dept: GENERAL RADIOLOGY | Facility: HOSPITAL | Age: 84
End: 2024-02-03
Attending: INTERNAL MEDICINE
Payer: MEDICARE

## 2024-02-03 ENCOUNTER — APPOINTMENT (OUTPATIENT)
Dept: ULTRASOUND IMAGING | Facility: HOSPITAL | Age: 84
End: 2024-02-03
Attending: INTERNAL MEDICINE
Payer: MEDICARE

## 2024-02-03 ENCOUNTER — APPOINTMENT (OUTPATIENT)
Dept: CV DIAGNOSTICS | Facility: HOSPITAL | Age: 84
End: 2024-02-03
Attending: INTERNAL MEDICINE
Payer: MEDICARE

## 2024-02-03 LAB
ALBUMIN SERPL-MCNC: 3.1 G/DL (ref 3.2–4.8)
ALBUMIN/GLOB SERPL: 0.9 {RATIO} (ref 1–2)
ALP LIVER SERPL-CCNC: 74 U/L
ALT SERPL-CCNC: 7 U/L
ANION GAP SERPL CALC-SCNC: 5 MMOL/L (ref 0–18)
AST SERPL-CCNC: 21 U/L (ref ?–34)
ATRIAL RATE: 99 BPM
BASOPHILS # BLD AUTO: 0.05 X10(3) UL (ref 0–0.2)
BASOPHILS NFR BLD AUTO: 0.5 %
BILIRUB SERPL-MCNC: 0.3 MG/DL (ref 0.2–1.1)
BUN BLD-MCNC: 11 MG/DL (ref 9–23)
BUN/CREAT SERPL: 14.5 (ref 10–20)
CALCIUM BLD-MCNC: 9.2 MG/DL (ref 8.7–10.4)
CHLORIDE SERPL-SCNC: 105 MMOL/L (ref 98–112)
CO2 SERPL-SCNC: 30 MMOL/L (ref 21–32)
CREAT BLD-MCNC: 0.76 MG/DL
DEPRECATED RDW RBC AUTO: 53.6 FL (ref 35.1–46.3)
EGFRCR SERPLBLD CKD-EPI 2021: 77 ML/MIN/1.73M2 (ref 60–?)
EOSINOPHIL # BLD AUTO: 0.28 X10(3) UL (ref 0–0.7)
EOSINOPHIL NFR BLD AUTO: 2.9 %
ERYTHROCYTE [DISTWIDTH] IN BLOOD BY AUTOMATED COUNT: 16.3 % (ref 11–15)
GLOBULIN PLAS-MCNC: 3.3 G/DL (ref 2.8–4.4)
GLUCOSE BLD-MCNC: 233 MG/DL (ref 70–99)
GLUCOSE BLDC GLUCOMTR-MCNC: 177 MG/DL (ref 70–99)
GLUCOSE BLDC GLUCOMTR-MCNC: 250 MG/DL (ref 70–99)
GLUCOSE BLDC GLUCOMTR-MCNC: 287 MG/DL (ref 70–99)
GLUCOSE BLDC GLUCOMTR-MCNC: 288 MG/DL (ref 70–99)
HCT VFR BLD AUTO: 32.3 %
HGB BLD-MCNC: 10.4 G/DL
IMM GRANULOCYTES # BLD AUTO: 0.06 X10(3) UL (ref 0–1)
IMM GRANULOCYTES NFR BLD: 0.6 %
LYMPHOCYTES # BLD AUTO: 1.38 X10(3) UL (ref 1–4)
LYMPHOCYTES NFR BLD AUTO: 14.1 %
MCH RBC QN AUTO: 29.1 PG (ref 26–34)
MCHC RBC AUTO-ENTMCNC: 32.2 G/DL (ref 31–37)
MCV RBC AUTO: 90.2 FL
MONOCYTES # BLD AUTO: 0.73 X10(3) UL (ref 0.1–1)
MONOCYTES NFR BLD AUTO: 7.4 %
NEUTROPHILS # BLD AUTO: 7.31 X10 (3) UL (ref 1.5–7.7)
NEUTROPHILS # BLD AUTO: 7.31 X10(3) UL (ref 1.5–7.7)
NEUTROPHILS NFR BLD AUTO: 74.5 %
OSMOLALITY SERPL CALC.SUM OF ELEC: 297 MOSM/KG (ref 275–295)
P AXIS: 30 DEGREES
P-R INTERVAL: 150 MS
PLATELET # BLD AUTO: 301 10(3)UL (ref 150–450)
POTASSIUM SERPL-SCNC: 3.9 MMOL/L (ref 3.5–5.1)
POTASSIUM SERPL-SCNC: 3.9 MMOL/L (ref 3.5–5.1)
PROT SERPL-MCNC: 6.4 G/DL (ref 5.7–8.2)
Q-T INTERVAL: 370 MS
QRS DURATION: 70 MS
QTC CALCULATION (BEZET): 474 MS
R AXIS: -14 DEGREES
RBC # BLD AUTO: 3.58 X10(6)UL
SODIUM SERPL-SCNC: 140 MMOL/L (ref 136–145)
T AXIS: 37 DEGREES
TROPONIN I SERPL HS-MCNC: 22 NG/L
VENTRICULAR RATE: 99 BPM
WBC # BLD AUTO: 9.8 X10(3) UL (ref 4–11)

## 2024-02-03 PROCEDURE — 84132 ASSAY OF SERUM POTASSIUM: CPT | Performed by: INTERNAL MEDICINE

## 2024-02-03 PROCEDURE — 93306 TTE W/DOPPLER COMPLETE: CPT | Performed by: INTERNAL MEDICINE

## 2024-02-03 PROCEDURE — 87086 URINE CULTURE/COLONY COUNT: CPT | Performed by: INTERNAL MEDICINE

## 2024-02-03 PROCEDURE — 82962 GLUCOSE BLOOD TEST: CPT

## 2024-02-03 PROCEDURE — 85025 COMPLETE CBC W/AUTO DIFF WBC: CPT | Performed by: INTERNAL MEDICINE

## 2024-02-03 PROCEDURE — 80053 COMPREHEN METABOLIC PANEL: CPT | Performed by: INTERNAL MEDICINE

## 2024-02-03 PROCEDURE — 84484 ASSAY OF TROPONIN QUANT: CPT | Performed by: INTERNAL MEDICINE

## 2024-02-03 PROCEDURE — 71045 X-RAY EXAM CHEST 1 VIEW: CPT | Performed by: INTERNAL MEDICINE

## 2024-02-03 PROCEDURE — 76775 US EXAM ABDO BACK WALL LIM: CPT | Performed by: INTERNAL MEDICINE

## 2024-02-03 PROCEDURE — 93975 VASCULAR STUDY: CPT | Performed by: INTERNAL MEDICINE

## 2024-02-03 NOTE — PROGRESS NOTES
[facility] Cardiology Progress Note    Chester Bautista Patient Status:  Inpatient    1940 MRN [id number]   Location [facility] 3W/SW Attending Heike Sorto MD   Hosp Day # 1 PCP Heike Sorto MD     Subjective:  Denies chest pain, shortness of breath      Objective:  /57 (BP Location: Right arm)   Pulse 90   Temp 98 °F (36.7 °C) (Oral)   Resp 18   Ht 5' 1\" (1.549 m)   Wt 138 lb 11.2 oz (62.9 kg)   SpO2 97%   BMI 26.21 kg/m²     Telemetry: NSR , 90 BPM       Intake/Output:    Intake/Output Summary (Last 24 hours) at 2/3/2024 0935  Last data filed at 2/3/2024 0600  Gross per 24 hour   Intake 400 ml   Output 301 ml   Net 99 ml       Last 3 Weights   24 0548 138 lb 11.2 oz (62.9 kg)   24 142 lb 3.2 oz (64.5 kg)   24 1340 145 lb 4.5 oz (65.9 kg)   24 1248 145 lb 3.2 oz (65.9 kg)   23 0830 147 lb (66.7 kg)   23 1249 147 lb (66.7 kg)       Labs:  Recent Labs   Lab 24  1211 24  2129 24  0549   *  --  233*   BUN 16  --  11   CREATSERUM 0.79  --  0.76   EGFRCR 74  --  77   CA 9.4  --  9.2     --  140   K 2.6* 3.3* 3.9  3.9     --  105   CO2 29.0  --  30.0     Recent Labs   Lab 24  1208 24  0549   RBC 3.88 3.58*   HGB 10.9* 10.4*   HCT 34.4* 32.3*   MCV 88.7 90.2   MCH 28.1 29.1   MCHC 31.7 32.2   RDW 16.3* 16.3*   NEPRELIM 8.70* 7.31   WBC 11.9* 9.8   .0 301.0         Recent Labs   Lab 24  1859 24  2129 24  0024   TROPHS 20 19 22       Diagnostics:         Review of Systems   Respiratory: Negative.     Cardiovascular: Negative.      Physical Exam:    General: Alert and oriented x 3. No apparent distress.   HEENT: Normocephalic, anicteric sclera, neck supple, no thyromegaly or adenopathy.  Neck: No JVD, carotids 2+, no bruits.  Cardiac: Regular rate & rhythm. S1, S2 normal. No murmur, pericardial rub, S3, or extra cardiac sounds.  Lungs: Clear without  wheezes, rales, rhonchi or dullness.  Normal excursions and effort.  Abdomen: Soft, non-tender. No organosplenomegally, mass or rebound, BS-present.  Extremities: Without clubbing or cyanosis.  No left lower extremity edema, no right lower extremity edema.  Neurologic: Alert and oriented, normal affect. No focal defects  Skin: Warm and dry.       Medications:   NIFEdipine ER  60 mg Oral Daily    carvedilol  25 mg Oral BID with meals    fluticasone furoate-vilanterol  1 puff Inhalation Daily    hydroxychloroquine  200 mg Oral Daily    cetirizine  10 mg Oral Daily    montelukast  10 mg Oral Nightly    predniSONE  5 mg Oral Daily    rosuvastatin  20 mg Oral Nightly    spironolactone  50 mg Oral Daily    insulin aspart  1-5 Units Subcutaneous TID CC and HS    fluconazole  150 mg Oral Once    losartan  50 mg Oral Daily    pantoprazole  20 mg Oral QAM AC         Assessment:  83-year-old female history of hypertension, hyperlipidemia, COPD, diabetes chronic iron deficiency anemia now with intermittent chest pain.     1.  Chest pain  Atypical. EKG no high risk features identified.  Check 2D echocardiogram with Doppler.  Troponins negative x 2.     2.  Hypokalemia - resolved     3.  Vaginal pruritus - per PMD     4.  Iron deficiency anemia - per Hematology      5.  Shortness of breath  Clinically no evidence of CHF. Chest x-ray does not show pulmonary edema. Euvolemic on exam. Low suspicion for volume overload    6.  Hypertension  On Coreg 25 mg twice daily, losartan 50 mg daily and spironolactone 50 mg daily.  Nifedipine 60mg daily added yesterday with significant improvement in blood pressure. Check renal ultrasound to evaluate renal artery stenosis.       7. Hyperlipidemia  Last . On Rosuvastatin     Plan:  Presently asymptomatic. Pending echocardiogram.   Blood pressure well controlled on current regimen. Pending renal ultrasound  Continue current cardiac medications    If echo unremarkable, patient may be  discharged home & recommend outpatient ischemic evaluation     JANI Smith  2/3/2024  9:35 AM  Ph [phone] ([place])  Ph [phone] ([place])  \____________________________  Pt s data reviewed and discussed with the APN.    Tele: NSR      I have personally performed the medical decision making in its entirety. My additions include:  Plan  - Echo with preserved EF. No major valve issues.  - Cv eval can be accomplished as an outpt.    R3    ____________________________  Mark Jay MD, FACC, RS  Cardiac Electrophysiololgy  [facility]  2/3/2024

## 2024-02-03 NOTE — H&P
[facility]    History and Physical    Chester Bautista Patient Status:  Inpatient    1940 MRN [id number]   Location [facility] 3W/SW Attending Heike Sorto MD   Hosp Day # 1 PCP Heike Sorto MD     Date:  2/3/2024  Date of Admission:  2024      HPI:     Chief Complaint   Patient presents with    Abnormal Result     HPI  Pt is an 82 y/o female with PMH of COPD, DM, HTN and iron deficiency anemia who presented to the ED from hematologist office for elevated blood pressure. BP in the /100, CXR with diffuse bilateral pulmonary opacities, K 2.6 and +PCT. Cardiology was consulted and she was admitted for further eval and treatment.    Pt seen today resting in bed. No c/o pain, SOB or N/V/D.   History     Past Medical History:   Diagnosis Date    Anxiety state     Cancer (HCC)     breast cancer 2018    Chronic obstructive pulmonary disease, unspecified (HCC) 2023    Chronic obstructive pulmonary disease, unspecified (HCC) 2023    COPD (chronic obstructive pulmonary disease) (HCC)     Diabetes (HCC)     Essential hypertension     Exposure to medical diagnostic radiation     High blood pressure     High cholesterol     History of blood transfusion 2022    low hemoglobin    Osteoarthritis     PONV (postoperative nausea and vomiting)     Pulmonary emphysema (HCC)     Rheumatoid arthritis (HCC)      Past Surgical History:   Procedure Laterality Date    CATARACT EXTRACTION W/  INTRAOCULAR LENS IMPLANT Bilateral 2017    Dr in [facility]     COLONOSCOPY      COLONOSCOPY N/A 2022    Procedure: COLONOSCOPY;  Surgeon: Mikey Garcia MD;  Location: [facility] ENDOSCOPY    COLONOSCOPY N/A 06/15/2023    Procedure: COLONOSCOPY;  Surgeon: Mikey Garcia MD;  Location: [facility] ENDOSCOPY    CORRECT BUNION,SIMPLE      right foot      EGD  2023    Dr. Garcia; Duodenal AVM's x4 cauterized    HYSTERECTOMY      , no abnormal Paps, due to heavy bleeding with  fibroids.  Not sure if it had bilateral salpingo-oophorectomy.    IR ANEURYSM REPAIRM      Computer assisted volumetric craniofomy with clipping of anterior cerebral artery.    LUMPECTOMY RIGHT      RADIATION RIGHT      ROTATOR CUFF REPAIR          TOTAL KNEE REPLACEMENT Right 2010    TOTAL KNEE REPLACEMENT Left 2015    TRANSORAL INCISIONLESS FUNDOPLICATION - INTERNAL  2012 Fredy fundoplication at [facility] Dr. Fournier.    Fredy fundoplication at [facility] Dr. Fournier.    TRIGGER FINGER RELEASE      left hand      YAG CAPSULOTOMY - OU - BOTH EYES Bilateral 2021    done in [place]     Family History   Problem Relation Age of Onset    Heart Surgery Mother         Leaky valve at 41 y/o.     Prostate Cancer Brother     Cancer Brother     Diabetes Maternal Grandmother     Diabetes Paternal Aunt     Breast Cancer Self 78    Breast Cancer Niece         before 50    Macular degeneration Neg     Glaucoma Neg      Social History:  Social History     Socioeconomic History    Marital status:    Tobacco Use    Smoking status: Former     Packs/day: .25     Types: Cigarettes     Quit date:      Years since quittin.1    Smokeless tobacco: Never    Tobacco comments:     [id number]   Vaping Use    Vaping Use: Never used   Substance and Sexual Activity    Alcohol use: Never    Drug use: Never   Social History Narrative    Just moved in from [place].   passed away and that is why moved from [place] to be with daughter who lives in [place].  Currently lives with daughter.     Social Determinants of Health     Food Insecurity: No Food Insecurity (2024)    Food Insecurity     Food Insecurity: Never true   Transportation Needs: No Transportation Needs (2024)    Transportation Needs     Lack of Transportation: No   Housing Stability: Low Risk  (2024)    Housing Stability     Housing Instability: No     Allergies/Medications:   Allergies:    Allergies   Allergen Reactions    Celebrex [Celecoxib] PALPITATIONS    Penicillins ITCHING and SWELLING    Metformin And Related UNKNOWN    Olmesartan UNKNOWN     Medications Prior to Admission   Medication Sig    omeprazole 20 MG Oral Capsule Delayed Release Take 1 capsule (20 mg total) by mouth every morning.    cyclobenzaprine 10 MG Oral Tab Take 1 tablet (10 mg total) by mouth nightly as needed for Muscle spasms.    glipiZIDE 5 MG Oral Tab Take 2 tablets (10 mg total) by mouth daily with breakfast AND 0.5 tablets (2.5 mg total) daily with dinner.    glycopyrrolate 1 MG Oral Tab Take 1 tablet (1 mg total) by mouth 3 (three) times daily.    hydroxychloroquine 200 MG Oral Tab Take 1 tablet (200 mg total) by mouth daily.    predniSONE 5 MG Oral Tab Take 1 tablet (5 mg total) by mouth daily.    montelukast 10 MG Oral Tab Take 1 tablet (10 mg total) by mouth nightly.    LORATADINE 10 MG Oral Tab TAKE 1 TABLET BY MOUTH EVERY DAY    carvedilol 25 MG Oral Tab Take 1 tablet (25 mg total) by mouth 2 (two) times daily with meals.    valsartan 80 MG Oral Tab Take 1 tablet (80 mg total) by mouth daily.    spironolactone 25 MG Oral Tab Take 2 tablets (50 mg total) by mouth daily.    fluticasone-salmeterol (WIXELA INHUB) 250-50 MCG/ACT Inhalation Aerosol Powder, Breath Activated Inhale 1 puff into the lungs every 12 (twelve) hours.    albuterol 108 (90 Base) MCG/ACT Inhalation Aero Soln inhale 2 puff by inhalation route  every 4 - 6 hours as needed    acetaminophen 500 MG Oral Tab Take 1 tablet (500 mg total) by mouth every 6 (six) hours as needed for Pain.    Glucose Blood (ACCU-CHEK GUIDE) In Vitro Strip Check blood sugar twice a day (fasting and before dinner). DX: E11.42, NIDDM    rosuvastatin (CRESTOR) 20 MG Oral Tab Take 1 tablet (20 mg total) by mouth nightly.    Nystatin Does not apply Powder 1 Application daily. Apply over cream to rash for 4 weeks and after that just use this. (Patient not taking: Reported on  2/2/2024)    clotrimazole-betamethasone 1-0.05 % External Cream Apply 1 Application topically 2 (two) times daily. Apply on the rash and a centimeter beyond rash on dry skin and a light layer.  Do that for 4 weeks.  If no better in 2 weeks call.  If better continue for 4 weeks with powder over. (Patient not taking: Reported on 2/2/2024)    albuterol 108 (90 Base) MCG/ACT Inhalation Aero Soln inhale 2 puff by inhalation route  every 4 - 6 hours as needed    leflunomide 20 MG Oral Tab Take 1 tablet (20 mg total) by mouth daily. (Patient not taking: Reported on 2/2/2024)    BIOTIN OR Take by mouth. (Patient not taking: Reported on 12/21/2023)    methylPREDNISolone 4 MG Oral Tablet Therapy Pack Take as directed on package. (Patient not taking: Reported on 12/21/2023)    traMADol 50 MG Oral Tab Take 1 tablet (50 mg total) by mouth every 12 (twelve) hours as needed for Pain. (Patient not taking: Reported on 2/2/2024)    predniSONE 10 MG Oral Tab Take 30 mg for 2 days then 20 mg for 2 days then 10 mg for one day and after that continue with your regular (Patient not taking: Reported on 12/21/2023)    Accu-Chek FastClix Lancets Does not apply Misc Check sugar once daily as directed    Blood Glucose Monitoring Suppl (ONETOUCH VERIO) w/Device Does not apply Kit 1 each daily. Test 1x daily    Glucose Blood (ONETOUCH VERIO) In Vitro Strip Test 1x daily    OneTouch Delica Lancets 33G Does not apply Misc 4 x daily    Blood Glucose Monitoring Suppl (BLOOD GLUCOSE SYSTEM GILLES) Does not apply Kit DX E 11.9.  Checks every morning.    Lancets Does not apply Misc DX E 11.9.  Checks every morning.    Blood Glucose Monitoring Suppl w/Device Does not apply Kit DX E 11.9.  Checks every morning.    Blood Gluc Meter Disp-Strips Does not apply Device DX E 11.9.  Checks every morning.    clonazePAM 0.5 MG Oral Tab Take 1 tablet (0.5 mg total) by mouth 2 (two) times daily as needed for Anxiety. Patient is taking one tablet by mouth every night  at bedtime as needed for anxiety. (Patient not taking: Reported on 2/2/2024)    aspirin 81 MG Oral Tab EC Take 325 mg by mouth daily. (Patient not taking: Reported on 2/2/2024)       Review of Systems:     Constitutional:  Negative for chills and fever.   Respiratory:  Negative for cough and shortness of breath.    Cardiovascular:  Negative for palpitations.   Gastrointestinal:  Negative for nausea, vomiting and diarrhea.   Musculoskeletal:  Negative for back pain.   Neurological:  Negative for dizziness and speech difficulty.   Psychiatric/Behavioral:  Negative for confusion and agitation.        Physical Exam:   Vital Signs:  Blood pressure 121/57, pulse 90, temperature 98 °F (36.7 °C), temperature source Oral, resp. rate 18, height 5' 1\" (1.549 m), weight 138 lb 11.2 oz (62.9 kg), SpO2 97%.  Physical Exam  Vitals and nursing note reviewed.   Constitutional:       Appearance: Normal appearance.   HENT:      Head: Atraumatic.      Nose: Nose normal.   Eyes:      Conjunctiva/sclera: Conjunctivae normal.      Pupils: Pupils are equal, round, and reactive to light.   Cardiovascular:      Rate and Rhythm: Normal rate and regular rhythm.      Pulses: Normal pulses.      Heart sounds: Normal heart sounds.   Pulmonary:      Effort: Pulmonary effort is normal.      Breath sounds: Normal breath sounds.   Abdominal:      General: Bowel sounds are normal.      Palpations: Abdomen is soft.   Musculoskeletal:         General: Normal range of motion.      Cervical back: Normal range of motion.   Skin:     General: Skin is warm and dry.   Neurological:      General: No focal deficit present.      Mental Status: She is alert and oriented to person, place, and time.   Psychiatric:         Mood and Affect: Mood normal.         Behavior: Behavior normal.         Results:     Lab Results   Component Value Date    WBC 9.8 02/03/2024    HGB 10.4 (L) 02/03/2024    HCT 32.3 (L) 02/03/2024    .0 02/03/2024    CREATSERUM 0.76  02/03/2024    BUN 11 02/03/2024     02/03/2024    K 3.9 02/03/2024    K 3.9 02/03/2024     02/03/2024    CO2 30.0 02/03/2024     (H) 02/03/2024    CA 9.2 02/03/2024    ALB 3.1 (L) 02/03/2024    ALKPHO 74 02/03/2024    BILT 0.3 02/03/2024    TP 6.4 02/03/2024    AST 21 02/03/2024    ALT 7 (L) 02/03/2024    PTT 30.8 12/14/2023    INR 0.93 12/14/2023    TSH 0.622 10/20/2023    ESRML 91 (H) 09/14/2023    CRP 1.10 (H) 09/14/2023    TROPHS 22 02/03/2024    B12 748 10/20/2023     US VENOUS DOPPLER LEG RIGHT - DIAG IMG (CPT=93971)    Result Date: 2/2/2024  CONCLUSION: Normal examination.     Dictated by (CST): Reji Bonds MD on 2/02/2024 at 3:15 PM     Finalized by (CST): Reji Bonds MD on 2/02/2024 at 3:15 PM          XR CHEST AP PORTABLE  (CPT=71045)    Result Date: 2/2/2024  CONCLUSION:   Multifocal opacities throughout both lungs, which is concerning for multifocal pneumonia or less likely pulmonary edema.  Recommend follow-up chest radiograph in 4-6 weeks to monitor for resolution.  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     Dictated by (CST): Joe Deleon MD on 2/02/2024 at 3:08 PM     Finalized by (CST): Joe Deleon MD on 2/02/2024 at 3:15 PM         EKG 12 Lead    Result Date: 2/3/2024  Normal sinus rhythm Possible Left atrial enlargement Minimal voltage criteria for LVH, may be normal variant ( R in aVL ) Possible Anterior infarct , age undetermined Abnormal ECG No previous ECGs found in [facility] Confirmed by DAXA FRAZIER ([id number]) on 2/3/2024 11:51:33 AM     Assessment/Plan:   *Pneumonia  CXR: multifocal opacities throughout both lungs   +PCT  Afebrile/no leukocytosis  Cont rocephin  On 2L NC - wean as tolerated   Repeat CXR  Monitor     *Hypokalemia  K 2.6 -->  Electrolyte protocol  Monitor labs     *Vaginal pruritus  Cont diflucan      *DM  A1C  Accu check w/ SSI  Hypoglycemia protocol    *HTN  Cont losartan, nifedipine, coreg and spironolactone      *RA  Cont leflunomide and hydroxychloroquine     *HLD  Cont rosuvastatin       DVT prophylaxis: SCDs  Code status: FULL CODE    TALISHA SHOOK MD  2/3/2024

## 2024-02-03 NOTE — PLAN OF CARE
Patient admitted from ED w/ Hyperkalemia from Dr. Wang office. Denies any chest pain. Call light within reach safety precautions in place. Plan: 2D echo and US kidneys.

## 2024-02-03 NOTE — CM/SW NOTE
02/03/24 1300   CM/SW Referral Data   Referral Source Physician   Reason for Referral Discharge planning   Informant Daughter  (Brea w/ pt at bedside)   Patient Info   Patient's Current Mental Status at Time of Assessment Alert;Oriented   Patient's Home Environment House   Number of Levels in Home 3   Number of Stair in Home 14 to bedroom   Patient lives with Daughter   Patient Status Prior to Admission   Independent with ADLs and Mobility Yes   Services in place prior to admission DME/Supplies at home   Type of DME/Supplies Straight Cane;Standard Walker   Discharge Needs   Anticipated D/C needs Home health care;To be determined   Choice of Post-Acute Provider   Informed patient of right to choose their preferred provider Yes   List of appropriate post-acute services provided to patient/family with quality data   ([facility])       Received MDO for DC Planning.    SW spoke to pt's dtr Brea via pt's room phone. Pt was w/ her at this time. Above assessment completed.    Confirmed pt lives w/ Brea in a home w/ 2 levels plus a basement. Pt only goes to the basement when someone is w/ her and not too frequently. Pt's bedroom is on the 2nd level w/ 14 steps up.    Pt has a cane and walker at home to use if needed. Per Brea, pt typically uses the cane PRN at home.    Pt does not have hx w/ HH services. Pt does have SHAKIR hx w/ a facility in [place]. Per pt's dtr, pt moved to [place] approx 2 yrs ago.    RACHAEL discussed possible benefits/recommendation for HH upon DC. Pt and pt's dtr confirmed agreeable to HH.    SW [facility]. F2F entered/sent.    PLAN: Home w/ poss HH - pending list/choice, pt agreeable & med clear      SW/CM to remain available for support and/or discharge planning.           Niharika Estrada, MSW, LSW [id number]

## 2024-02-03 NOTE — PLAN OF CARE
Problem: Patient Centered Care  Goal: Patient preferences are identified and integrated in the patient's plan of care  Description: Interventions:  - What would you like us to know as we care for you? From home with daughter  - Provide timely, complete, and accurate information to patient/family  - Incorporate patient and family knowledge, values, beliefs, and cultural backgrounds into the planning and delivery of care  - Encourage patient/family to participate in care and decision-making at the level they choose  - Honor patient and family perspectives and choices  Outcome: Progressing     Problem: Diabetes/Glucose Control  Goal: Glucose maintained within prescribed range  Description: INTERVENTIONS:  - Monitor Blood Glucose as ordered  - Assess for signs and symptoms of hyperglycemia and hypoglycemia  - Administer ordered medications to maintain glucose within target range  - Assess barriers to adequate nutritional intake and initiate nutrition consult as needed  - Instruct patient on self management of diabetes  Outcome: Progressing     Problem: Patient/Family Goals  Goal: Patient/Family Long Term Goal  Description: Patient's Long Term Goal: to return home    Interventions:  - take medications as prescribed  - follow MD orders  - monitor labs  - See additional Care Plan goals for specific interventions  Outcome: Progressing  Goal: Patient/Family Short Term Goal  Description: Patient's Short Term Goal: to report pain    Interventions:   - PRN pain medications  -2Decho, US kidneys  - See additional Care Plan goals for specific interventions  Outcome: Progressing     Problem: METABOLIC/FLUID AND ELECTROLYTES - ADULT  Goal: Electrolytes maintained within normal limits  Description: INTERVENTIONS:  - Monitor labs and rhythm and assess patient for signs and symptoms of electrolyte imbalances  - Administer electrolyte replacement as ordered  - Monitor response to electrolyte replacements, including rhythm and repeat  lab results as appropriate  - Fluid restriction as ordered  - Instruct patient on fluid and nutrition restrictions as appropriate  Outcome: Progressing     Problem: CARDIOVASCULAR - ADULT  Goal: Maintains optimal cardiac output and hemodynamic stability  Description: INTERVENTIONS:  - Monitor vital signs, rhythm, and trends  - Monitor for bleeding, hypotension and signs of decreased cardiac output  - Evaluate effectiveness of vasoactive medications to optimize hemodynamic stability  - Monitor arterial and/or venous puncture sites for bleeding and/or hematoma  - Assess quality of pulses, skin color and temperature  - Assess for signs of decreased coronary artery perfusion - ex. Angina  - Evaluate fluid balance, assess for edema, trend weights  Outcome: Progressing  Goal: Absence of cardiac arrhythmias or at baseline  Description: INTERVENTIONS:  - Continuous cardiac monitoring, monitor vital signs, obtain 12 lead EKG if indicated  - Evaluate effectiveness of antiarrhythmic and heart rate control medications as ordered  - Initiate emergency measures for life threatening arrhythmias  - Monitor electrolytes and administer replacement therapy as ordered  Outcome: Progressing     Problem: PAIN - ADULT  Goal: Verbalizes/displays adequate comfort level or patient's stated pain goal  Description: INTERVENTIONS:  - Encourage pt to monitor pain and request assistance  - Assess pain using appropriate pain scale  - Administer analgesics based on type and severity of pain and evaluate response  - Implement non-pharmacological measures as appropriate and evaluate response  - Consider cultural and social influences on pain and pain management  - Manage/alleviate anxiety  - Utilize distraction and/or relaxation techniques  - Monitor for opioid side effects  - Notify MD/LIP if interventions unsuccessful or patient reports new pain  - Anticipate increased pain with activity and pre-medicate as appropriate  Outcome: Progressing      Patient alert and oriented x4.  Call light and belongings within reach.  PRN Tylenol and Tramadol given for pain. Potassium replaced per protocol.  For 2decho and US kidneys today.

## 2024-02-03 NOTE — PLAN OF CARE
Problem: Patient Centered Care  Goal: Patient preferences are identified and integrated in the patient's plan of care  Description: Interventions:  - What would you like us to know as we care for you? Get better   - Provide timely, complete, and accurate information to patient/family  - Incorporate patient and family knowledge, values, beliefs, and cultural backgrounds into the planning and delivery of care  - Encourage patient/family to participate in care and decision-making at the level they choose  - Honor patient and family perspectives and choices  Outcome: Progressing     Problem: Diabetes/Glucose Control  Goal: Glucose maintained within prescribed range  Description: INTERVENTIONS:  - Monitor Blood Glucose as ordered  - Assess for signs and symptoms of hyperglycemia and hypoglycemia  - Administer ordered medications to maintain glucose within target range  - Assess barriers to adequate nutritional intake and initiate nutrition consult as needed  - Instruct patient on self management of diabetes  Outcome: Progressing     Problem: Patient/Family Goals  Goal: Patient/Family Long Term Goal  Description: Patient's Long Term Goal: get better and go home    Interventions:  - cardiology consult  -assess q shift prn  -up to chair for meals  - See additional Care Plan goals for specific interventions  Outcome: Progressing  Goal: Patient/Family Short Term Goal  Description: Patient's Short Term Goal: will remain without injury    Interventions:   - call light and personal belongings ae within reach  -pain controlled  -bathroom assistance  -hourly rounds done  - See additional Care Plan goals for specific interventions  Outcome: Progressing     Problem: Diabetes/Glucose Control  Goal: Glucose maintained within prescribed range  Description: INTERVENTIONS:  - Monitor Blood Glucose as ordered  - Assess for signs and symptoms of hyperglycemia and hypoglycemia  - Administer ordered medications to maintain glucose within  target range  - Assess barriers to adequate nutritional intake and initiate nutrition consult as needed  - Instruct patient on self management of diabetes  Outcome: Progressing     Problem: METABOLIC/FLUID AND ELECTROLYTES - ADULT  Goal: Electrolytes maintained within normal limits  Description: INTERVENTIONS:  - Monitor labs and rhythm and assess patient for signs and symptoms of electrolyte imbalances  - Administer electrolyte replacement as ordered  - Monitor response to electrolyte replacements, including rhythm and repeat lab results as appropriate  - Fluid restriction as ordered  - Instruct patient on fluid and nutrition restrictions as appropriate  Outcome: Progressing     Problem: CARDIOVASCULAR - ADULT  Goal: Maintains optimal cardiac output and hemodynamic stability  Description: INTERVENTIONS:  - Monitor vital signs, rhythm, and trends  - Monitor for bleeding, hypotension and signs of decreased cardiac output  - Evaluate effectiveness of vasoactive medications to optimize hemodynamic stability  - Monitor arterial and/or venous puncture sites for bleeding and/or hematoma  - Assess quality of pulses, skin color and temperature  - Assess for signs of decreased coronary artery perfusion - ex. Angina  - Evaluate fluid balance, assess for edema, trend weights  Outcome: Progressing  Goal: Absence of cardiac arrhythmias or at baseline  Description: INTERVENTIONS:  - Continuous cardiac monitoring, monitor vital signs, obtain 12 lead EKG if indicated  - Evaluate effectiveness of antiarrhythmic and heart rate control medications as ordered  - Initiate emergency measures for life threatening arrhythmias  - Monitor electrolytes and administer replacement therapy as ordered  Outcome: Progressing     Problem: PAIN - ADULT  Goal: Verbalizes/displays adequate comfort level or patient's stated pain goal  Description: INTERVENTIONS:  - Encourage pt to monitor pain and request assistance  - Assess pain using appropriate pain  scale  - Administer analgesics based on type and severity of pain and evaluate response  - Implement non-pharmacological measures as appropriate and evaluate response  - Consider cultural and social influences on pain and pain management  - Manage/alleviate anxiety  - Utilize distraction and/or relaxation techniques  - Monitor for opioid side effects  - Notify MD/LIP if interventions unsuccessful or patient reports new pain  - Anticipate increased pain with activity and pre-medicate as appropriate  Outcome: Progressing

## 2024-02-04 VITALS
HEIGHT: 61 IN | DIASTOLIC BLOOD PRESSURE: 54 MMHG | TEMPERATURE: 99 F | OXYGEN SATURATION: 95 % | SYSTOLIC BLOOD PRESSURE: 123 MMHG | WEIGHT: 141.31 LBS | HEART RATE: 97 BPM | RESPIRATION RATE: 20 BRPM | BODY MASS INDEX: 26.68 KG/M2

## 2024-02-04 LAB
GLUCOSE BLDC GLUCOMTR-MCNC: 207 MG/DL (ref 70–99)
GLUCOSE BLDC GLUCOMTR-MCNC: 239 MG/DL (ref 70–99)

## 2024-02-04 PROCEDURE — 82962 GLUCOSE BLOOD TEST: CPT

## 2024-02-04 RX ORDER — HEPARIN SODIUM (PORCINE) LOCK FLUSH IV SOLN 100 UNIT/ML 100 UNIT/ML
500 SOLUTION INTRAVENOUS ONCE
Status: COMPLETED | OUTPATIENT
Start: 2024-02-04 | End: 2024-02-04

## 2024-02-04 NOTE — PLAN OF CARE
Pt. A/Ox4, on 2LO2, VSS. No acute events. Pt educated on call light use, within reach. Safety measures in place.     Problem: Patient Centered Care  Goal: Patient preferences are identified and integrated in the patient's plan of care  Description: Interventions:  - What would you like us to know as we care for you?   - Provide timely, complete, and accurate information to patient/family  - Incorporate patient and family knowledge, values, beliefs, and cultural backgrounds into the planning and delivery of care  - Encourage patient/family to participate in care and decision-making at the level they choose  - Honor patient and family perspectives and choices  Outcome: Progressing     Problem: Diabetes/Glucose Control  Goal: Glucose maintained within prescribed range  Description: INTERVENTIONS:  - Monitor Blood Glucose as ordered  - Assess for signs and symptoms of hyperglycemia and hypoglycemia  - Administer ordered medications to maintain glucose within target range  - Assess barriers to adequate nutritional intake and initiate nutrition consult as needed  - Instruct patient on self management of diabetes  Outcome: Progressing    Problem: METABOLIC/FLUID AND ELECTROLYTES - ADULT  Goal: Electrolytes maintained within normal limits  Description: INTERVENTIONS:  - Monitor labs and rhythm and assess patient for signs and symptoms of electrolyte imbalances  - Administer electrolyte replacement as ordered  - Monitor response to electrolyte replacements, including rhythm and repeat lab results as appropriate  - Fluid restriction as ordered  - Instruct patient on fluid and nutrition restrictions as appropriate  Outcome: Progressing     Problem: PAIN - ADULT  Goal: Verbalizes/displays adequate comfort level or patient's stated pain goal  Description: INTERVENTIONS:  - Encourage pt to monitor pain and request assistance  - Assess pain using appropriate pain scale  - Administer analgesics based on type and severity of pain  and evaluate response  - Implement non-pharmacological measures as appropriate and evaluate response  - Consider cultural and social influences on pain and pain management  - Manage/alleviate anxiety  - Utilize distraction and/or relaxation techniques  - Monitor for opioid side effects  - Notify MD/LIP if interventions unsuccessful or patient reports new pain  - Anticipate increased pain with activity and pre-medicate as appropriate  Outcome: Progressing     Problem: CARDIOVASCULAR - ADULT  Goal: Maintains optimal cardiac output and hemodynamic stability  Description: INTERVENTIONS:  - Monitor vital signs, rhythm, and trends  - Monitor for bleeding, hypotension and signs of decreased cardiac output  - Evaluate effectiveness of vasoactive medications to optimize hemodynamic stability  - Monitor arterial and/or venous puncture sites for bleeding and/or hematoma  - Assess quality of pulses, skin color and temperature  - Assess for signs of decreased coronary artery perfusion - ex. Angina  - Evaluate fluid balance, assess for edema, trend weights  Outcome: Progressing  Goal: Absence of cardiac arrhythmias or at baseline  Description: INTERVENTIONS:  - Continuous cardiac monitoring, monitor vital signs, obtain 12 lead EKG if indicated  - Evaluate effectiveness of antiarrhythmic and heart rate control medications as ordered  - Initiate emergency measures for life threatening arrhythmias  - Monitor electrolytes and administer replacement therapy as ordered  Outcome: Progressing

## 2024-02-04 NOTE — DISCHARGE INSTRUCTIONS
Home Health:   A referral for Physical and Occupational Therapy at home is pending review. We are waiting for a provider to confirm they are in network with your insurance and can staff your home address.    If you do not hear from an [facility]  or directly from a Home Health agency by Tuesday, 2/6, please follow up with your Primary Care Physician for assistance.

## 2024-02-04 NOTE — PLAN OF CARE
Problem: Patient Centered Care  Goal: Patient preferences are identified and integrated in the patient's plan of care  Description: Interventions:  - What would you like us to know as we care for you? Lives home with daughter  - Provide timely, complete, and accurate information to patient/family  - Incorporate patient and family knowledge, values, beliefs, and cultural backgrounds into the planning and delivery of care  - Encourage patient/family to participate in care and decision-making at the level they choose  - Honor patient and family perspectives and choices  Outcome: Progressing     Problem: Diabetes/Glucose Control  Goal: Glucose maintained within prescribed range  Description: INTERVENTIONS:  - Monitor Blood Glucose as ordered  - Assess for signs and symptoms of hyperglycemia and hypoglycemia  - Administer ordered medications to maintain glucose within target range  - Assess barriers to adequate nutritional intake and initiate nutrition consult as needed  - Instruct patient on self management of diabetes  Outcome: Progressing     Problem: Patient/Family Goals  Goal: Patient/Family Long Term Goal  Description: Patient's Long Term Goal: get better and go home    Interventions:  - assess q shift prn  -up to chair for meals   -iv abx  -cxry  - See additional Care Plan goals for specific interventions  Outcome: Progressing  Goal: Patient/Family Short Term Goal  Description: Patient's Short Term Goal: will remain without injury    Interventions:   - call light and personal belongings are within reach  -pain controlled  -bathroom assistance   -hourly rounds done  - See additional Care Plan goals for specific interventions  Outcome: Progressing     Problem: METABOLIC/FLUID AND ELECTROLYTES - ADULT  Goal: Electrolytes maintained within normal limits  Description: INTERVENTIONS:  - Monitor labs and rhythm and assess patient for signs and symptoms of electrolyte imbalances  - Administer electrolyte replacement as  ordered  - Monitor response to electrolyte replacements, including rhythm and repeat lab results as appropriate  - Fluid restriction as ordered  - Instruct patient on fluid and nutrition restrictions as appropriate  Outcome: Progressing     Problem: CARDIOVASCULAR - ADULT  Goal: Maintains optimal cardiac output and hemodynamic stability  Description: INTERVENTIONS:  - Monitor vital signs, rhythm, and trends  - Monitor for bleeding, hypotension and signs of decreased cardiac output  - Evaluate effectiveness of vasoactive medications to optimize hemodynamic stability  - Monitor arterial and/or venous puncture sites for bleeding and/or hematoma  - Assess quality of pulses, skin color and temperature  - Assess for signs of decreased coronary artery perfusion - ex. Angina  - Evaluate fluid balance, assess for edema, trend weights  Outcome: Progressing  Goal: Absence of cardiac arrhythmias or at baseline  Description: INTERVENTIONS:  - Continuous cardiac monitoring, monitor vital signs, obtain 12 lead EKG if indicated  - Evaluate effectiveness of antiarrhythmic and heart rate control medications as ordered  - Initiate emergency measures for life threatening arrhythmias  - Monitor electrolytes and administer replacement therapy as ordered  Outcome: Progressing     Problem: PAIN - ADULT  Goal: Verbalizes/displays adequate comfort level or patient's stated pain goal  Description: INTERVENTIONS:  - Encourage pt to monitor pain and request assistance  - Assess pain using appropriate pain scale  - Administer analgesics based on type and severity of pain and evaluate response  - Implement non-pharmacological measures as appropriate and evaluate response  - Consider cultural and social influences on pain and pain management  - Manage/alleviate anxiety  - Utilize distraction and/or relaxation techniques  - Monitor for opioid side effects  - Notify MD/LIP if interventions unsuccessful or patient reports new pain  - Anticipate  increased pain with activity and pre-medicate as appropriate  Outcome: Progressing

## 2024-02-04 NOTE — PROGRESS NOTES
Progress Note  Chester Bautista Patient Status:  Inpatient    1940 MRN [id number]   Location [facility] 3W/SW Attending Heike Sorto MD   Hosp Day # 2 PCP Heike Sorto MD     Subjective:  In bed on exam. Reports improvement since admission. Denies cp. Mild cough.     Objective:  /66 (BP Location: Right arm)   Pulse 96   Temp 98.6 °F (37 °C) (Oral)   Resp 18   Ht 5' 1\" (1.549 m)   Wt 141 lb 4.8 oz (64.1 kg)   SpO2 97%   BMI 26.70 kg/m²     Telemetry: nsr      Intake/Output:    Intake/Output Summary (Last 24 hours) at 2024 0752  Last data filed at 2/3/2024 2112  Gross per 24 hour   Intake 910 ml   Output 900 ml   Net 10 ml       Last 3 Weights   24 0539 141 lb 4.8 oz (64.1 kg)   24 0548 138 lb 11.2 oz (62.9 kg)   24 142 lb 3.2 oz (64.5 kg)   24 1340 145 lb 4.5 oz (65.9 kg)   24 1248 145 lb 3.2 oz (65.9 kg)   23 0830 147 lb (66.7 kg)   23 1249 147 lb (66.7 kg)       Labs:  Recent Labs   Lab 24  1211 24  2129 24  0549   *  --  233*   BUN 16  --  11   CREATSERUM 0.79  --  0.76   EGFRCR 74  --  77   CA 9.4  --  9.2     --  140   K 2.6* 3.3* 3.9  3.9     --  105   CO2 29.0  --  30.0     Recent Labs   Lab 24  1208 24  0549   RBC 3.88 3.58*   HGB 10.9* 10.4*   HCT 34.4* 32.3*   MCV 88.7 90.2   MCH 28.1 29.1   MCHC 31.7 32.2   RDW 16.3* 16.3*   NEPRELIM 8.70* 7.31   WBC 11.9* 9.8   .0 301.0         Recent Labs   Lab 24  1859 24  2129 24  0024   TROPHS        Review of Systems   Cardiovascular: Negative.    Respiratory:  Positive for cough.        Physical Exam:    Gen: alert, oriented x 3, NAD  Heent: pupils equal, reactive. Mucous membranes moist.   Neck: no jvd  Cardiac: regular rate and rhythm, normal S1,S2  Lungs: CTA  Abd: soft, NT/ND +bs  Ext: no edema  Skin: Warm, dry  Neuro: No focal deficits        Medications:     cefTRIAXone  1 g  Intravenous Q24H    NIFEdipine ER  60 mg Oral Daily    carvedilol  25 mg Oral BID with meals    fluticasone furoate-vilanterol  1 puff Inhalation Daily    hydroxychloroquine  200 mg Oral Daily    cetirizine  10 mg Oral Daily    montelukast  10 mg Oral Nightly    predniSONE  5 mg Oral Daily    rosuvastatin  20 mg Oral Nightly    spironolactone  50 mg Oral Daily    insulin aspart  1-5 Units Subcutaneous TID CC and HS    losartan  50 mg Oral Daily    pantoprazole  20 mg Oral QAM AC             Assessment:  Chest pain  Echo with preserved LVEF 50-55%  EKG without acute ischemic changes.  Trop negative x2  PNA, SOB-abx per primary  Vaginal pruritus -per primary  BUZZ--heme following  HTN-improved.   Renal US without evidence of EDGARDO  Bb, arb, sandra, ccb  HL-statin  RA-steroids, plaquenil     Plan:  Bp controlled on current regimen.   Abx per primary.   No further inpatient cardiac work up. F/u as OP with Dr. Gong and OP stress test at that time. Cardiology will sign off, please call with additional questions.     Plan of care discussed with patient, RN.    Umu Caban, APRN  2/4/2024  7:52 AM  [phone]  [facility] [phone]    ____________________________  Pt s data reviewed and discussed with the APN.    Tele: NSR      I have personally performed the medical decision making in its entirety. My additions include:  Plan  - stable Cv status    R3    ____________________________  Mark Jay MD, FACC, FHRS  Cardiac Electrophysiololgy  [facility]  2/4/2024

## 2024-02-04 NOTE — CM/SW NOTE
02/04/24 1300   Discharge disposition   Expected discharge disposition Home-Health  (HH ref pending)   Discharge transportation Private car       Received notice from [facility] that pt is cleared for DC today.    Per [facility], HH agencies still pending verification they are in network w/ pt's insurance and staffing.    SW spoke to pt via her room phone. SW explained pt is able to DC home today and informed her HH is pending at this time. SW explained that the agencies need to verify her insurance benefits Monday. SW also explained if pt does not hear from someone by Tuesday, to f/up w/ her PCP for assistance. Pt expressed understanding.    SW provided above instructions in pt's AVS as well. SW has asked Monday SW/CM team to f/up for accepting provider.    Pt is cleared from SW/CM stand point. [facility] updated.      PLAN: Home w/ HH pending          Niharika Estrada, MSW, LSW [id number]

## 2024-02-05 ENCOUNTER — TELEPHONE (OUTPATIENT)
Dept: ENDOCRINOLOGY CLINIC | Facility: CLINIC | Age: 84
End: 2024-02-05

## 2024-02-05 ENCOUNTER — PATIENT OUTREACH (OUTPATIENT)
Dept: CASE MANAGEMENT | Age: 84
End: 2024-02-05

## 2024-02-05 NOTE — CM/SW NOTE
02/05/24 1400   Discharge disposition   Expected discharge disposition Home-Health   Post Acute Care Provider [facility]   Discharge transportation Private car     [facility] has been reserved in [facility] and is aware to contact the pt. Directly for start of care.     ROBERT Jack ext [phone]

## 2024-02-05 NOTE — PROGRESS NOTES
DM apt request (discharged 02/04)    Dr Kami Puente  Endocrinology Diabetes and Metabolism  [address]  [place], [place] [id number] [phone]  Dr Puente's office will call pt, due to no availability in needed time frame with any provider    Dr Heike Sorto  Internal Medicine  [address] [place] [address] [place] [address]  [phone]  Follow up 1 week  Apt made:  Fri 02/16 @2:30pm & on waitlist  Confirmed w/pt daughter, Brea  Closing encounter

## 2024-02-05 NOTE — TELEPHONE ENCOUNTER
Chhaya from bedside scheduling calling to schedule hospital fu in two weeks. Nothing open until March. Please call daughter.

## 2024-02-06 ENCOUNTER — PATIENT OUTREACH (OUTPATIENT)
Dept: CASE MANAGEMENT | Age: 84
End: 2024-02-06

## 2024-02-06 DIAGNOSIS — Z02.9 ENCOUNTERS FOR ADMINISTRATIVE PURPOSE: ICD-10-CM

## 2024-02-06 DIAGNOSIS — J81.0 ACUTE PULMONARY EDEMA (HCC): Primary | ICD-10-CM

## 2024-02-06 PROCEDURE — 1126F AMNT PAIN NOTED NONE PRSNT: CPT

## 2024-02-06 PROCEDURE — 1159F MED LIST DOCD IN RCRD: CPT

## 2024-02-06 PROCEDURE — 1111F DSCHRG MED/CURRENT MED MERGE: CPT

## 2024-02-06 NOTE — PAYOR COMM NOTE
--------------  ADMISSION REVIEW     Payor: TRINO ARREGUIN O  Subscriber #:  [id number]  Authorization Number: [id number]    Admit date: 2/2/24  Admit time:  5:36 PM       REVIEW DOCUMENTATION:    ED Provider Notes signed by Hugh Robert MD at 2/2/2024  3:57 PM      History     Chief Complaint   Patient presents with    Abnormal Result     HPI    83 year old female history of hypertension, diabetes, COPD, chronic iron deficiency anemia in the setting of bleeding AVMs status post repeat endoscopic treatments sent in by hematology for evaluation.  Patient states for the past few days she was feeling weaker, lightheaded and more dyspneic.  She has been getting IV iron therapies, did not get today.  Noted in blood work today that she had low potassium and elevated blood pressure.  Patient states at baseline her blood pressure is 160s to 170 systolic at home, she has been managed by her primary care physician -compliant with all of her medicines.  She also states she has had vaginal pruritus consistent with typical yeast infection, she was using nystatin but excellently dropped arrestive into the toilet and is requesting treatment.  Also endorsing swelling of her right lower extremity for some time now, she does note that she used to hang it off her bed when she would be sitting.  Swelling is better in the morning and worse at the end of the day after standing.  No weakness or paresthesias.    Past Medical History:   Diagnosis Date    Anxiety state     Cancer (HCC)     breast cancer 2018    Chronic obstructive pulmonary disease, unspecified (HCC) 05/04/2023    Chronic obstructive pulmonary disease, unspecified (HCC) 05/04/2023    COPD (chronic obstructive pulmonary disease) (HCC)     Diabetes (HCC)     Essential hypertension     Exposure to medical diagnostic radiation     High blood pressure     High cholesterol     History of blood transfusion 07/2022    low hemoglobin    Osteoarthritis     PONV (postoperative nausea and  vomiting)     Pulmonary emphysema (HCC)     Rheumatoid arthritis (HCC)        Physical Exam     ED Triage Vitals [02/02/24 1340]   BP (!) 202/91   Pulse 97   Resp 20   Temp 98 °F (36.7 °C)   Temp src Oral   SpO2 92 %   O2 Device None (Room air)       Physical Exam  Constitutional:       General: She is not in acute distress.  Eyes:      Extraocular Movements: Extraocular movements intact.   Cardiovascular:      Rate and Rhythm: Normal rate.      Pulses: Normal pulses.      Heart sounds: Normal heart sounds.   Pulmonary:      Effort: Pulmonary effort is normal. No respiratory distress.      Breath sounds: Rhonchi present.   Musculoskeletal:      Cervical back: Normal range of motion.      Comments: 1+ pitting edema to right lower extremity   Neurological:      General: No focal deficit present.      Mental Status: She is alert.     Labs Reviewed   URINALYSIS, ROUTINE - Abnormal; Notable for the following components:       Result Value    Clarity Urine Turbid (*)     Blood Urine 3+ (*)     Protein Urine 300 (*)     Leukocyte Esterase Urine 500 (*)     WBC Urine >50 (*)     RBC Urine >10 (*)     Bacteria Urine 3+ (*)     Squamous Epi. Cells Few (*)     All other components within normal limits   PROCALCITONIN - Abnormal; Notable for the following components:    Procalcitonin 0.17 (*)     All other components within normal limits   TROPONIN I HIGH SENSITIVITY - Normal   BNP (B TYPE NATRIURETIC PEPTIDE)   BLOOD CULTURE   BLOOD CULTURE     US VENOUS DOPPLER LEG RIGHT - DIAG IMG (CPT=93971)    Result Date: 2/2/2024  CONCLUSION: Normal examination.     Dictated by (CST): Reji Bonds MD on 2/02/2024 at 3:15 PM     Finalized by (CST): Reji Bonds MD on 2/02/2024 at 3:15 PM          XR CHEST AP PORTABLE  (CPT=71045)    Result Date: 2/2/2024  CONCLUSION:   Multifocal opacities throughout both lungs, which is concerning for multifocal pneumonia or less likely pulmonary edema.  Recommend follow-up chest radiograph in 4-6  weeks to monitor for resolution.  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     Dictated by (CST): Joe Deleon MD on 2/02/2024 at 3:08 PM     Finalized by (CST): Joe Deleon MD on 2/02/2024 at 3:15 PM               MDM     Vitals:    02/02/24 1340 02/02/24 1430 02/02/24 1450   BP: (!) 202/91 (!) 168/80    Pulse: 97 96    Resp: 20 21    Temp: 98 °F (36.7 °C)     TempSrc: Oral     SpO2: 92% 92% 91%   Weight: 65.9 kg     Height: 154.9 cm (5' 1\")         Generalized weakness, lightheadedness, fatigue.  Possible dehydration, electro abnormalities.  I reviewed labs done from clinic showing electrolyte abnormalities, will start to replete potassium and will prophylactically give magnesium.  Patient is not anemic at this time worse than her baseline.  Lower extremity swelling may be from venous insufficiency, DVT versus development of heart failure.    ED Course as of 02/02/24 1557  ------------------------------------------------------------  Time: 02/02 1352  Comment: EKG interpretation by me: EKG sinus rhythm at a rate of 99, axis nomal, no concerning acute ischemic ST changes, lvh noted  ------------------------------------------------------------  Time: 02/02 1518  Comment: My interpretation of chest x-ray with diffuse bilateral pulmonary opacities  ------------------------------------------------------------  Time: 02/02 1523  Comment: Given significantly elevated blood pressure, concern for development of heart failure.  Patient has no cough, fever to suggest pneumonia, will send procalcitonin and blood cultures.  It does appear however she has urinary tract infection, will start antibiotics.  ------------------------------------------------------------  Time: 02/02 1555  Comment: Concern for heart failure, given hypokalemia we will hold on Lasix at this time.   Consulted with cardiology.  Admitted for further care     Disposition and Plan     Clinical Impression:  1. Acute pulmonary edema (HCC)    2. Hypertension, unspecified type    3. Hypokalemia    4. Weakness generalized    5. Urinary tract infection without hematuria, site unspecified          2/2 Cardiology  Impression:   83-year-old female history of hypertension, hyperlipidemia, COPD, diabetes chronic iron deficiency anemia now with chest pain.        Recommendations:     1.  Chest pain  Atypical EKG no high risk features identified.  Check 2D echocardiogram with Doppler.  Risk factors include hypertension hyperlipidemia and family history premature CAD.  If echocardiogram is normal recommend outpatient ischemic evaluation.  2.  Hypokalemia  2.6 potassium started on IV replacement.  3.  Vaginal pruritus  Suspected yeast infection management as per primary team.  4.  Iron deficiency anemia  Management as per hematology.  5.  Shortness of breath  Clinically no evidence of CHF chest x-ray does not show pulmonary edema euvolemic on exam flat JVD low suspicion for volume overload  6.  Hypertension  Refractory on multiple blood pressure pills.  On Coreg 25 mg twice daily, valsartan 80 mg daily and spironolactone 50 mg daily.  Check renal ultrasound to evaluate renal artery stenosis.  Add nifedipine 60 mg daily.        L5 consult, will follow  Reason for Consultation:   Chest pain, hypertension        History of Present Illness:   Patient is a 83 year old female who was admitted to the hospital for several complaints.  Prior history of hypertension, hyperlipidemia, COPD, chronic iron deficiency anemia in the setting of AV malformation repeat endoscopic treatments.  Saw her hematologist today who recommended the patient come to emergency room.  She reports taking 3 blood pressure medications blood pressure has been elevated at home.  Also had vaginal pruritus sent to ER for workup.  Denies any prior cardiac problems or  workup.  Yesterday had intermittent chest pain.  At rest on exertion mostly sedentary just walks around her house.  No syncope or palpitations reported.  No orthopnea or lower extremity edema.     Current Facility-Administered Medications  Medication  Dose  Route  Frequency    potassium chloride 40 mEq in 250mL sodium chloride 0.9% IVPB premix   40 mEq  Intravenous  Once    cefTRIAXone (Rocephin) 1 g in D5W 100 mL IVPB-ADD   1 g  Intravenous  Once          2/3 History and Physical   Pt is an 82 y/o female with PMH of COPD, DM, HTN and iron deficiency anemia who presented to the ED from hematologist office for elevated blood pressure. BP in the /100, CXR with diffuse bilateral pulmonary opacities, K 2.6 and +PCT. Cardiology was consulted and she was admitted for further eval and treatment.     Vital Signs:  Blood pressure 121/57, pulse 90, temperature 98 °F (36.7 °C), temperature source Oral, resp. rate 18, height 5' 1\" (1.549 m), weight 138 lb 11.2 oz (62.9 kg), SpO2 97%.      Component Value Date     WBC 9.8 02/03/2024     HGB 10.4 (L) 02/03/2024     HCT 32.3 (L) 02/03/2024     .0 02/03/2024     CREATSERUM 0.76 02/03/2024     BUN 11 02/03/2024      02/03/2024     K 3.9 02/03/2024     K 3.9 02/03/2024      02/03/2024     CO2 30.0 02/03/2024      (H) 02/03/2024     CA 9.2 02/03/2024     ALB 3.1 (L) 02/03/2024     ALKPHO 74 02/03/2024     BILT 0.3 02/03/2024     TP 6.4 02/03/2024     AST 21 02/03/2024     ALT 7 (L) 02/03/2024       TROPHS 22 02/03/2024     Assessment/Plan:   *Pneumonia  CXR: multifocal opacities throughout both lungs   +PCT  Afebrile/no leukocytosis  Cont rocephin  On 2L NC - wean as tolerated   Repeat CXR  Monitor      *Hypokalemia  K 2.6 -->  Electrolyte protocol  Monitor labs      *Vaginal pruritus  Cont diflucan      *DM  A1C  Accu check w/ SSI  Hypoglycemia protocol     *HTN  Cont losartan, nifedipine, coreg and spironolactone       *RA  Cont leflunomide and  hydroxychloroquine      *HLD  Cont rosuvastatin         DVT prophylaxis: SCDs  Code status: FULL CODE          2/3 Cardiology  /57 (BP Location: Right arm)   Pulse 90   Temp 98 °F (36.7 °C) (Oral)   Resp 18   Ht 5' 1\" (1.549 m)   Wt 138 lb 11.2 oz (62.9 kg)   SpO2 97%   BMI 26.21 kg/m²      Telemetry: NSR , 90 BPM     Assessment:  83-year-old female history of hypertension, hyperlipidemia, COPD, diabetes chronic iron deficiency anemia now with intermittent chest pain.      1.  Chest pain  Atypical. EKG no high risk features identified.  Check 2D echocardiogram with Doppler.  Troponins negative x 2.      2.  Hypokalemia - resolved      3.  Vaginal pruritus - per PMD      4.  Iron deficiency anemia - per Hematology       5.  Shortness of breath  Clinically no evidence of CHF. Chest x-ray does not show pulmonary edema. Euvolemic on exam. Low suspicion for volume overload     6.  Hypertension  On Coreg 25 mg twice daily, losartan 50 mg daily and spironolactone 50 mg daily.  Nifedipine 60mg daily added yesterday with significant improvement in blood pressure. Check renal ultrasound to evaluate renal artery stenosis.        7. Hyperlipidemia  Last . On Rosuvastatin      Plan:  Presently asymptomatic. Pending echocardiogram.   Blood pressure well controlled on current regimen. Pending renal ultrasound  Continue current cardiac medications    If echo unremarkable, patient may be discharged home & recommend outpatient ischemic evaluation       Plan  - Echo with preserved EF. No major valve issues.  - Cv eval can be accomplished as an outpt.          2/4 Cardiology  /66 (BP Location: Right arm)   Pulse 96   Temp 98.6 °F (37 °C) (Oral)   Resp 18   Ht 5' 1\" (1.549 m)   Wt 141 lb 4.8 oz (64.1 kg)   SpO2 97%   BMI 26.70 kg/m²      Telemetry: nsr    Respiratory:  Positive for cough.        cefTRIAXone  1 g Intravenous Q24H     Assessment:  Chest pain  Echo with preserved LVEF 50-55%  EKG without  acute ischemic changes.  Trop negative x2  PNA, SOB-abx per primary  Vaginal pruritus -per primary  BUZZ--heme following  HTN-improved.   Renal US without evidence of EDGARDO  Bb, arb, sandra, ccb  HL-statin  RA-steroids, plaquenil      Plan:  Bp controlled on current regimen.   Abx per primary.       Medications 02/02/24 02/03/24 02/04/24   cefTRIAXone (Rocephin) 1 g in D5W 100 mL IVPB-ADD  Dose: 1 g  Freq: Every 24 hours Route: IV  Last Dose: 1 g (02/03/24 1657)  Start: 02/03/24 1700 End: 02/04/24 1703   Admin Instructions:   Ceftriaxone must NOT be administered simultaneously with calcium containing IV solutions. Includes Y-site as well.  In patients other than neonates ceftriaxone and calcium containing products may administered sequentially, provided the line is flushed in between administrations.   Order specific questions:        1657 AW-New Bag           1703-D/C'd       cefTRIAXone (Rocephin) 1 g in D5W 100 mL IVPB-ADD  Dose: 1 g  Freq: Once Route: IV  Last Dose: 1 g (02/02/24 1719)  Start: 02/02/24 1522 End: 02/02/24 1749   Admin Instructions:   Ceftriaxone must NOT be administered simultaneously with calcium containing IV solutions. Includes Y-site as well.  In patients other than neonates ceftriaxone and calcium containing products may administered sequentially, provided the line is flushed in between administrations.   Order specific questions:       1719 SN-New Bag   1800 SN-Handoff           fluconazole (Diflucan) tab 150 mg  Dose: 150 mg  Freq: Once Route: OR  Start: 02/02/24 1404 End: 02/02/24 1444   Admin Instructions:   CAUTION: REPRODUCTIVE RISK   Order specific questions:       1444 SN-Given            magnesium sulfate in sterile water for injection 2 g/50mL IVPB premix 2 g  Dose: 2 g  Freq: Once Route: IV  Last Dose: Stopped (02/02/24 1720)  Start: 02/02/24 1403 End: 02/02/24 1720   Order specific questions:       1448 SN-New Bag   1720 SN-Stopped           potassium chloride (K-Dur) tab 40  mEq  Dose: 40 mEq  Freq: Once Route: OR  Start: 02/02/24 1403 End: 02/02/24 1445   Admin Instructions:   Do not crush    1445 SN-Given            potassium chloride 40 mEq in 250mL sodium chloride 0.9% IVPB premix  Dose: 40 mEq  Freq: Once Route: IV  Last Dose: 40 mEq (02/02/24 2322)  Start: 02/02/24 2245 End: 02/03/24 0322 2322 CB-New Bag            potassium chloride 40 mEq in 250mL sodium chloride 0.9% IVPB premix  Dose: 40 mEq  Freq: Once Route: IV  Last Dose: Stopped (02/02/24 1648)  Start: 02/02/24 1404 End: 02/02/24 1648   Admin Instructions:   To be administered via port    1448 SN-New Bag   1648 SN-Stopped           predniSONE (Deltasone) tab 5 mg  Dose: 5 mg  Freq: Daily Route: OR  Start: 02/02/24 1815 End: 02/04/24 1703    (2005 CB)-Not Given        0836 AW-Given        0905 AW-Given   1703-D/C'd       rosuvastatin (Crestor) tab 20 mg  Dose: 20 mg  Freq: Nightly Route: OR  Start: 02/02/24 2100 End: 02/04/24 1703 2115 CB-Given        2113 MD-Given        1703-D/C'd        spironolactone (Aldactone) tab 50 mg  Dose: 50 mg  Freq: Daily Route: OR  Start: 02/02/24 1815 End: 02/04/24 1703   Admin Instructions:   CAUTION: HAZARDOUS DRUG    (2006 CB)-Not Given        0836 AW-Given        0905 AW-Given   1703-D/C'd         Medications 02/02/24 02/03/24 02/04/24   acetaminophen (Tylenol Extra Strength) tab 500 mg  Dose: 500 mg  Freq: Every 6 hours PRN Route: OR  PRN Reason: mild pain  Start: 02/02/24 1751 End: 02/04/24 1703 2115 CB-Given        0529 CB-Return to Cabinet        1703-D/C'd        cyclobenzaprine (Flexeril) tab 10 mg  Dose: 10 mg  Freq: Nightly PRN Route: OR  PRN Reason: Muscle spasms  Start: 02/02/24 2007 End: 02/04/24 1703 2115 CB-Given        2116 MD-Given        1703-D/C'd        traMADol (Ultram) tab 50 mg  Dose: 50 mg  Freq: Every 12 hours PRN Route: OR  PRN Reason: moderate pain  Start: 02/02/24 1752 End: 02/04/24 1703   Admin Instructions:   Max dose 400 mg/day     0147 CB-Given         1703-D/C'd          Vitals (last day) before discharge       Date/Time Temp Pulse Resp BP SpO2 Weight O2 Device O2 Flow Rate (L/min) [facility]    02/04/24 0900 98.7 °F (37.1 °C) 97 20 123/54 95 % -- None (Room air) --     02/04/24 0539 -- -- -- -- 97 % -- Nasal cannula 2 L/min MD    02/04/24 0539 -- -- -- -- -- 141 lb 4.8 oz -- --     02/04/24 0537 98.6 °F (37 °C) 96 18 130/66 88 % -- None (Room air) -- MD    02/03/24 2111 -- -- -- -- 96 % -- Nasal cannula 2 L/min MD    02/03/24 2110 98.3 °F (36.8 °C) 88 18 129/61 88 % -- None (Room air) -- MD    02/03/24 1900 -- 91 -- -- -- -- -- --     02/03/24 1500 98.1 °F (36.7 °C) 94 18 105/45 96 % -- None (Room air) --     02/03/24 0834 98 °F (36.7 °C) 90 18 121/57 97 % -- Nasal cannula 1 L/min     02/03/24 0700 -- 92 -- -- -- -- -- -- AW    02/03/24 0548 -- -- -- -- -- 138 lb 11.2 oz -- -- CB    02/03/24 0535 98.4 °F (36.9 °C) 103 18 138/61 92 % -- Nasal cannula 2 L/min CB    02/03/24 0531 -- -- -- -- 84 % -- None (Room air) -- CB    02/03/24 0038 -- -- -- 156/58 -- -- -- -- CB                   --------------  DISCHARGE REVIEW    Payor: TRINO AIKEN  Subscriber #:  [id number]  Authorization Number: [id number]    Admit date: 2/2/24  Admit time:   5:36 PM  Discharge Date: 2/4/2024  3:03 PM     Admitting Physician: Heike Sorto MD  Attending Physician:  No att. providers found  Primary Care Physician: Heike Sorto M

## 2024-02-06 NOTE — PROGRESS NOTES
Initial Post Discharge Follow Up   Discharge Date: 2/4/24  Contact Date: 2/6/2024    Consent Verification:  Assessment Completed With: Other: daughter Brea Permission received per patient?  written  HIPAA Verified?  Yes    Discharge Dx:   Acute pulmonary edema     General:   How have you been since your discharge from the hospital? She's doing better, she's still a little weak. The  nurse came today and she will have physical therapy today too.     Do you have any pain since discharge?  No    When you were leaving the hospital were your discharge instructions reviewed with you? Yes  How well were your discharge instructions explained to you?   On a scale of 1-5   1- Very Poor and 5- Very well   Very Well  Do you have any questions about your discharge instructions?  No  Before leaving the hospital was your diagnoses explained to you? Yes  Do you have any questions about your diagnoses? No  Are you able to perform normal daily activities of living as you have prior to your hospital stay (dressing, bathing, ambulating to the bathroom, etc)? yes  (NCM) Was patient given a different diet per AVS? no      Medications:   Current Outpatient Medications   Medication Sig Dispense Refill    NIFEdipine ER 60 MG Oral Tablet 24 Hr Take 1 tablet (60 mg total) by mouth daily. 30 tablet 1    Glucose Blood (ACCU-CHEK GUIDE) In Vitro Strip Check blood sugar twice a day (fasting and before dinner). DX: E11.42, NIDDM 200 strip 1    rosuvastatin (CRESTOR) 20 MG Oral Tab Take 1 tablet (20 mg total) by mouth nightly. 90 tablet 0    omeprazole 20 MG Oral Capsule Delayed Release Take 1 capsule (20 mg total) by mouth every morning. 90 capsule 1    cyclobenzaprine 10 MG Oral Tab Take 1 tablet (10 mg total) by mouth nightly as needed for Muscle spasms. 90 tablet 1    glipiZIDE 5 MG Oral Tab Take 2 tablets (10 mg total) by mouth daily with breakfast AND 0.5 tablets (2.5 mg total) daily with dinner. 225 tablet 0    glycopyrrolate 1 MG  Oral Tab Take 1 tablet (1 mg total) by mouth 3 (three) times daily. 90 tablet 1    albuterol 108 (90 Base) MCG/ACT Inhalation Aero Soln inhale 2 puff by inhalation route  every 4 - 6 hours as needed 1 each 0    hydroxychloroquine 200 MG Oral Tab Take 1 tablet (200 mg total) by mouth daily. 90 tablet 1    predniSONE 5 MG Oral Tab Take 1 tablet (5 mg total) by mouth daily. 90 tablet 1    montelukast 10 MG Oral Tab Take 1 tablet (10 mg total) by mouth nightly. 90 tablet 3    LORATADINE 10 MG Oral Tab TAKE 1 TABLET BY MOUTH EVERY DAY 90 tablet 1    carvedilol 25 MG Oral Tab Take 1 tablet (25 mg total) by mouth 2 (two) times daily with meals. 180 tablet 3    valsartan 80 MG Oral Tab Take 1 tablet (80 mg total) by mouth daily. 90 tablet 3    spironolactone 25 MG Oral Tab Take 2 tablets (50 mg total) by mouth daily. 180 tablet 1    fluticasone-salmeterol (WIXELA INHUB) 250-50 MCG/ACT Inhalation Aerosol Powder, Breath Activated Inhale 1 puff into the lungs every 12 (twelve) hours. 1 each 5    albuterol 108 (90 Base) MCG/ACT Inhalation Aero Soln inhale 2 puff by inhalation route  every 4 - 6 hours as needed 1 each 5    Accu-Chek FastClix Lancets Does not apply Misc Check sugar once daily as directed 100 each 2    Blood Glucose Monitoring Suppl (ONETOUCH VERIO) w/Device Does not apply Kit 1 each daily. Test 1x daily 1 kit 0    Glucose Blood (ONETOUCH VERIO) In Vitro Strip Test 1x daily 100 strip 1    OneTouch Delica Lancets 33G Does not apply Misc 4 x daily 100 each 1    Blood Glucose Monitoring Suppl (BLOOD GLUCOSE SYSTEM GILLES) Does not apply Kit DX E 11.9.  Checks every morning. 1 kit 0    Lancets Does not apply Misc DX E 11.9.  Checks every morning. 50 each 11    Blood Glucose Monitoring Suppl w/Device Does not apply Kit DX E 11.9.  Checks every morning. 1 kit 0    Blood Gluc Meter Disp-Strips Does not apply Device DX E 11.9.  Checks every morning. 100 each 11    acetaminophen 500 MG Oral Tab Take 1 tablet (500 mg total) by  mouth every 6 (six) hours as needed for Pain.      aspirin 81 MG Oral Tab EC Take 325 mg by mouth daily.       Were there any changes to your current medication(s) noted on the AVS? Yes  If so, were these medication changes discussed with you prior to leaving the hospital? Yes  If a new medication was prescribed:    Was the new medication's purpose & side effects reviewed? Yes  Do you have any questions about your new medication? No  Did you  your discharge medications when you left the hospital? Yes  Let's go over your medications together to make sure we are not missing anything. Medications Reviewed  Are there any reasons that keep you from taking your medication as prescribed? No  Are you having any concerns with constipation? No  Did patient receive their flu shot (Sept-March)? Yes    Discharge medications reviewed/discussed/and reconciled against outpatient medications with patient.  Any changes or updates to medications sent to PCP.  Patient Acknowledged     Referrals/orders at D/C:  Referrals/orders placed at D/C? yes  What services:   Home health   (If HH was ordered) Has HH been set up?  Yes    If Yes: With Whom: [facility]    DME ordered at D/C? No      Discharge orders, AVS reviewed and discussed with patient. Any changes or updates to orders sent to PCP.  Patient Acknowledged      SDOH:   Transportation Needs: No Transportation Needs (2/2/2024)    Transportation Needs     Lack of Transportation: No     Financial Resource Strain: Low Risk  (2/6/2024)    Financial Resource Strain     Difficulty of Paying Living Expenses: Not hard at all     Med Affordability: No       Follow up appointments:      Your appointments       Date & Time Appointment Department ([place])    Feb 16, 2024  2:30 PM [facility] Follow Up with Heike Sorto MD [facility] [place] [place] ([facility])        Mar 11, 2024  1:30 PM CDT [place] Supervisit with Heike Sorto MD  [facility] [id number])        Mar 20, 2024  2:30 PM CDT Follow Up Visit with Vic Thakkar APRN [facility] ([facility])        Jul 17, 2024  1:45 PM CDT Exam - Established with Quinton Tesfaye MD [facility] ([facility])        Sep 24, 2024  1:45 PM CDT Follow Up Visit with Chapin Parker MD [facility] ([facility])              [facility]  [facility] [facility]  [address]  [facility] [address]  [phone] [facility]  [address]  [facility] [address]  [phone] [facility]  [address] [facility] [facility] [address]  [phone]    [facility] [place] [address]  [address] [address]  [phone] [facility]  [address] [id number] [phone]            TCC  Was TCC ordered: No      PCP (If no TCC appointment)  Does patient already have a PCP appointment scheduled? Yes  NCM Confirmed PCP office TCM appointment with patient      Specialist    Does the patient have any other follow up appointment(s) needing to be scheduled? Yes  If yes: NCM reviewed upcoming specialist appointment with patient: Yes  Does the patient need assistance scheduling appointment(s): No, Brea states that she wants to wait until Pt sees PCP to see who pt needs to follow up with.     Is there any reason as to why you cannot make your appointment(s)?  No     Needs post D/C:   Now that you are home, are there any needs or concerns you need addressed before your  next visit with your PCP?  (DME, meds, questions, etc.): No    Interventions by NCM:   NCM reviewed discharge instructions and when to seek medical attention with the patient's daughter Brea. She states that the patient is doing better. She does state that she noticed that the pt seems a little confused at times since she got sick. She noticed this in the hospital as well. She describes it as not very noticeable but when she starts talking to the patient, sometimes the patient will start a new topic of conversation which she really hasn't done before. She states that she will discuss this with PCP. She states that the HH RN was there today and bp was 154/70 p-102. She states that the patient has not checked her bs yet but will. She denied that the patient has had any fever, n/v/c/d, sob, pain, lightheadedness, HA or any new or worsening symptoms since discharge. She states that she will get a little out of breath walking up the stairs but that resolves fairly quickly and is not new. Med review completed. She denied having any questions or concerns at this time.     CCM referral placed:    No    BOOK BY DATE: 2/18/24

## 2024-02-07 NOTE — PAYOR COMM NOTE
Discharge Notification    Patient Name: HUMPHREY PARR SERVANDO  Payor: TRINO ARREGUIN O  Subscriber #: [id number]  Authorization Number: [id number]  Admit Date/Time: 2/2/2024 1:45 PM  Discharge Date/Time: 2/4/2024 3:03 PM

## 2024-02-07 NOTE — TELEPHONE ENCOUNTER
JANI Fabian,  See message below - patient hospitalized for acute pulmonary edema - you have fast pass tomorrow (2/8/24) at 1pm or VV on 3/1/24  Patient does have f/u on 3/20/24  Please advise -thanks

## 2024-02-07 NOTE — TELEPHONE ENCOUNTER
Ok to offer apts for either tomorrow 2/8 in [place] or Monday 2/12 at 3pm in [place].       Thank you!

## 2024-02-09 NOTE — CDS QUERY
CLINICAL DOCUMENTATION CLARIFICATION FORM  Dear Dr Sorto:  Clinical information (provided below) indicates chronic respiratory failure, please specify type.    ( x ) Acute Hypoxemic Respiratory Failure    (  ) Other (please specify): ____________________________    Clinical Indicators-  84-88% on room air  SOB    Risk Factors-  Pneumonia  Treatment -  Oxygen  IV ATB   CXR                If you have any questions, please contact Clinical : Delilah Kenny RN CDS  at [email or web address]    Thank You!    THIS FORM IS A PERMANENT PART OF THE MEDICAL RECORD

## 2024-02-12 ENCOUNTER — TELEPHONE (OUTPATIENT)
Dept: INTERNAL MEDICINE CLINIC | Facility: CLINIC | Age: 84
End: 2024-02-12

## 2024-02-12 DIAGNOSIS — E87.6 HYPOKALEMIA: ICD-10-CM

## 2024-02-12 DIAGNOSIS — E78.5 DYSLIPIDEMIA: ICD-10-CM

## 2024-02-12 DIAGNOSIS — E11.9 TYPE 2 DIABETES MELLITUS WITHOUT RETINOPATHY (HCC): ICD-10-CM

## 2024-02-12 DIAGNOSIS — D50.0 IRON DEFICIENCY ANEMIA DUE TO CHRONIC BLOOD LOSS: Primary | ICD-10-CM

## 2024-02-12 RX ORDER — HYDROXYCHLOROQUINE SULFATE 200 MG/1
200 TABLET, FILM COATED ORAL DAILY
Qty: 90 TABLET | Refills: 1 | Status: SHIPPED | OUTPATIENT
Start: 2024-02-12

## 2024-02-12 NOTE — TELEPHONE ENCOUNTER
Requested Prescriptions     Pending Prescriptions Disp Refills    hydroxychloroquine 200 MG Oral Tab 90 tablet 1     Sig: Take 1 tablet (200 mg total) by mouth daily.     Future Appointments   Date Time Provider Department Center   2/16/2024  2:30 PM Heike Sorto MD [facility] [place]   3/11/2024  1:30 PM Heike Sorto MD [id number] [facility] [place] [address]   3/20/2024  2:30 PM Vic Thakkar APRN [facility]   7/17/2024  1:45 PM Quinton Tesfaye MD [facility]  [facility]   9/24/2024  1:45 PM Chapin Parker MD [facility]     LOV: 8/10/23  Last Refilled:8/10/23 #90 1RF  Labs:    Component      Latest Ref Rng 2/3/2024   WBC      4.0 - 11.0 x10(3) uL 9.8    RBC      3.80 - 5.30 x10(6)uL 3.58 (L)    Hemoglobin      12.0 - 16.0 g/dL 10.4 (L)    Hematocrit      35.0 - 48.0 % 32.3 (L)    MCV      80.0 - 100.0 fL 90.2    MCH      26.0 - 34.0 pg 29.1    MCHC      31.0 - 37.0 g/dL 32.2    RDW-SD      35.1 - 46.3 fL 53.6 (H)    RDW      11.0 - 15.0 % 16.3 (H)    Platelet Count      150.0 - 450.0 10(3)uL 301.0    Prelim Neutrophil Abs      1.50 - 7.70 x10 (3) uL 7.31    Neutrophils Absolute      1.50 - 7.70 x10(3) uL 7.31    Lymphocytes Absolute      1.00 - 4.00 x10(3) uL 1.38    Monocytes Absolute      0.10 - 1.00 x10(3) uL 0.73    Eosinophils Absolute      0.00 - 0.70 x10(3) uL 0.28    Basophils Absolute      0.00 - 0.20 x10(3) uL 0.05    Immature Granulocyte Absolute      0.00 - 1.00 x10(3) uL 0.06    Neutrophils %      % 74.5    Lymphocytes %      % 14.1    Monocytes %      % 7.4    Eosinophils %      % 2.9    Basophils %      % 0.5    Immature Granulocyte %      % 0.6    Glucose      70 - 99 mg/dL 233 (H)    Sodium      136 - 145 mmol/L 140    Potassium      3.5 - 5.1 mmol/L 3.9    Chloride      98 - 112 mmol/L 105    Carbon Dioxide, Total      21.0 - 32.0 mmol/L 30.0    ANION GAP      0 - 18 mmol/L 5    BUN      9 - 23 mg/dL 11    CREATININE      0.55 - 1.02 mg/dL 0.76    BUN/CREATININE RATIO      10.0 -  20.0  14.5    CALCIUM      8.7 - 10.4 mg/dL 9.2    CALCULATED OSMOLALITY      275 - 295 mOsm/kg 297 (H)    EGFR      >=60 mL/min/1.73m2 77    ALT (SGPT)      10 - 49 U/L 7 (L)    AST (SGOT)      <=34 U/L 21    ALKALINE PHOSPHATASE      55 - 142 U/L 74    Total Bilirubin      0.2 - 1.1 mg/dL 0.3    PROTEIN, TOTAL      5.7 - 8.2 g/dL 6.4    Albumin      3.2 - 4.8 g/dL 3.1 (L)    Globulin      2.8 - 4.4 g/dL 3.3    A/G Ratio      1.0 - 2.0  0.9 (L)       Legend:  (L) Low  (H) High    ASSESSMENT/PLAN:      Seropositive RA- stable   - Currently on hydroxychloroquine 200 mg daily and prednisone 5 mg daily  - At times she will have pain in her hands and wrists but not severe.  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  Getting IV iron infusions     Peripheral neuropathy- stable, resolved   - Was seen by Dr. 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  Wondering if she needs to go on treatment for the positive hepatitis B     Pt will f/u in 3 mos      Ashia Goodrich MD  8/10/2023  12:40 PM

## 2024-02-12 NOTE — TELEPHONE ENCOUNTER
Just lipid and CMP.  Simply includes a potassium so we can see where we are at.  I will add a magnesium also in addition

## 2024-02-12 NOTE — TELEPHONE ENCOUNTER
Patient has the following visit for hospital follow up     Future Appointments   Date Time Provider Department Center   2/16/2024  2:30 PM Heike Sorto MD [facility]  [place]   3/11/2024  1:30 PM Heike Sorto MD [id number]  [place] [address]   3/20/2024  2:30 PM Vic Thakkar APRN [facility]  [facility]   7/17/2024  1:45 PM [facility]   9/24/2024  1:45 PM [facility]       Dr. Sorto: would you like any additional labs completed prior to visit? (There is outstanding CMP and LIPID)

## 2024-02-12 NOTE — TELEPHONE ENCOUNTER
Pts daughter Brea on MANUEL stts pt was just released from hospital and potassium was low. Pt has appt on 2/16/24 with  and wanted to know if Dr wanted Pt to do any labs before coming in? Pt has a port and when labs need to be done they have to make an apt they cannot do walk in. Please advise

## 2024-02-12 NOTE — TELEPHONE ENCOUNTER
Spoke with Brea (MANUEL) regarding blood work orders.   Brea will call and make lab appt for patient.

## 2024-02-14 ENCOUNTER — TELEPHONE (OUTPATIENT)
Dept: ENDOCRINOLOGY CLINIC | Facility: CLINIC | Age: 84
End: 2024-02-14

## 2024-02-14 RX ORDER — LANCETS
EACH MISCELLANEOUS
Qty: 100 EACH | Refills: 2 | Status: SHIPPED | OUTPATIENT
Start: 2024-02-14

## 2024-02-14 NOTE — TELEPHONE ENCOUNTER
Spoke with patients daughter and they will come in tomorrow at 415 pm per Caren, daughter understood and had no further questions or concerns at this time.

## 2024-02-15 ENCOUNTER — OFFICE VISIT (OUTPATIENT)
Dept: ENDOCRINOLOGY CLINIC | Facility: CLINIC | Age: 84
End: 2024-02-15

## 2024-02-15 VITALS
DIASTOLIC BLOOD PRESSURE: 75 MMHG | WEIGHT: 142 LBS | BODY MASS INDEX: 27 KG/M2 | SYSTOLIC BLOOD PRESSURE: 131 MMHG | HEART RATE: 105 BPM

## 2024-02-15 DIAGNOSIS — E11.42 TYPE 2 DIABETES MELLITUS WITH DIABETIC POLYNEUROPATHY, WITHOUT LONG-TERM CURRENT USE OF INSULIN (HCC): Primary | ICD-10-CM

## 2024-02-15 LAB
GLUCOSE BLOOD: 194
TEST STRIP LOT #: NORMAL NUMERIC

## 2024-02-15 PROCEDURE — 1160F RVW MEDS BY RX/DR IN RCRD: CPT | Performed by: NURSE PRACTITIONER

## 2024-02-15 PROCEDURE — 1111F DSCHRG MED/CURRENT MED MERGE: CPT | Performed by: NURSE PRACTITIONER

## 2024-02-15 PROCEDURE — 99214 OFFICE O/P EST MOD 30 MIN: CPT | Performed by: NURSE PRACTITIONER

## 2024-02-15 PROCEDURE — 3078F DIAST BP <80 MM HG: CPT | Performed by: NURSE PRACTITIONER

## 2024-02-15 PROCEDURE — 3075F SYST BP GE 130 - 139MM HG: CPT | Performed by: NURSE PRACTITIONER

## 2024-02-15 PROCEDURE — 1159F MED LIST DOCD IN RCRD: CPT | Performed by: NURSE PRACTITIONER

## 2024-02-15 PROCEDURE — 82947 ASSAY GLUCOSE BLOOD QUANT: CPT | Performed by: NURSE PRACTITIONER

## 2024-02-15 RX ORDER — GLIPIZIDE 5 MG/1
7.5 TABLET ORAL
COMMUNITY
Start: 2024-02-15

## 2024-02-15 NOTE — PROGRESS NOTES
Name: Chester Bautista  Date: 2/15/2024    CHIEF COMPLAINT   Chief Complaint   Patient presents with    Diabetes     HISTORY OF PRESENT ILLNESS   Chester Bautista is a 83 year old female who presents for follow up on diabetes management. Patient is accompanied by her daughter Tommy today.   HbA1C: 9.5% on 2/2/2024. This is increased from 7.0% on 10/18/2023.   Blood glucose is: 194 in clinic today.   Patient verbalizes that she is currently feeling better she was recently hospitalized with HTN, hyperglycemia, hypoxia, and hypokalemia.   She is currently at home and having home physical therapy. She is following a low carb diet and is compliant with diabetes medications.      FAMILY HISTORY OF DIABETES  -paternal grandmother and paternal aunt.   DIABETES HISTORY  Diagnosed: had prediabetes since 2003; diagnosed with T2DM 11/7/21;   Prior HbA, C or glycohemoglobin were 7.2% on 11/7/2021;  7.7% 2/26/2022; 7.3% 5/2/2022; 6.0% 9/1/2022; 6.4% 3/21/2023; 8.1% 6/29/2023; 7.0% 10/18/2023; 9.5% on 2/2/2024;     PREVIOUS MEDICATION FOR DM:  Metformin -d/c'ed due to allergic reaction - swelling to bottom of lip and tongue itching; also vomiting.     CURRENT MEDICATIONS FOR DM:  Glipizide 10mg once daily with breakfast --> has been taking 1 tablet  2.5mg with dinner  --> has not been taking due to lower glucose readings the following morning.     HOME GLUCOSE READINGS:   Testing 1-2x daily   Fasting readings: 80s, 108 today;   Before dinner: 250s-260s   Hypoglycemia symptoms: denies symptoms  Hyperglycemia symptoms: no     HISTORY OF DIABETES COMPLICATIONS:  History of Retinopathy: no  - last eye exam within the last 12 months: yes  History of Neuropathy: no   History of Nephropathy: no     ASSOCIATED COMPLICATIONS:   HTN: yes   Hyperlipidemia: yes   Cardiovascular Disease: no   Peripheral Vascular Disease: no     DIETARY COMPLIANCE:  -continues to eat 2-3 meals per day   Has been avoiding regular soda and fruit  juice and ice cream since LOV    EXERCISE:   Yes - participating at home PT    Polyuria, polyphagia, polydipsia: no   Paresthesias: no   Blurred vision: no   Recent steroids, illness or infections: yes  - on prednisone for pain - RA; has been taking since 7-9/2021    REVIEW OF SYSTEMS  Constitutional: Negative for: weight change, fever, fatigue, cold/heat intolerance  Eyes: Negative for:  Visual changes, proptosis, blurring  ENT: Negative for:  dysphagia, neck swelling, dysphonia  Respiratory: Negative for: hemoptysis, shortness of breath, cough, or dyspnea.  Cardiovascular: Negative for:  chest pain, chest discomfort, palpitations  GI: Negative for:  abdominal pain, nausea, vomiting, diarrhea, heartburn, constipation  Neurology: Negative for: headache, dizziness, syncope, numbness/tingling, or weakness.   Genito-Urinary: Negative for: dysuria, frequency or hematuria   Hematology/Lymphatics: Negative for: bruising, easy bleeding, lower extremity edema  Skin: Negative for: rash, blister, infection or ulcers.  Endocrine: Negative for: polyuria, polydipsia. No osteoporosis. No thyroid disease.     MEDICATIONS:     Current Outpatient Medications:     Accu-Chek FastClix Lancets Does not apply Misc, Check sugar once daily as directed (E11.42), Disp: 100 each, Rfl: 2    hydroxychloroquine 200 MG Oral Tab, Take 1 tablet (200 mg total) by mouth daily., Disp: 90 tablet, Rfl: 1    NIFEdipine ER 60 MG Oral Tablet 24 Hr, Take 1 tablet (60 mg total) by mouth daily., Disp: 30 tablet, Rfl: 1    Glucose Blood (ACCU-CHEK GUIDE) In Vitro Strip, Check blood sugar twice a day (fasting and before dinner). DX: E11.42, NIDDM, Disp: 200 strip, Rfl: 1    rosuvastatin (CRESTOR) 20 MG Oral Tab, Take 1 tablet (20 mg total) by mouth nightly., Disp: 90 tablet, Rfl: 0    omeprazole 20 MG Oral Capsule Delayed Release, Take 1 capsule (20 mg total) by mouth every morning., Disp: 90 capsule, Rfl: 1    cyclobenzaprine 10 MG Oral Tab, Take 1 tablet (10  mg total) by mouth nightly as needed for Muscle spasms., Disp: 90 tablet, Rfl: 1    glipiZIDE 5 MG Oral Tab, Take 2 tablets (10 mg total) by mouth daily with breakfast AND 0.5 tablets (2.5 mg total) daily with dinner., Disp: 225 tablet, Rfl: 0    glycopyrrolate 1 MG Oral Tab, Take 1 tablet (1 mg total) by mouth 3 (three) times daily., Disp: 90 tablet, Rfl: 1    albuterol 108 (90 Base) MCG/ACT Inhalation Aero Soln, inhale 2 puff by inhalation route  every 4 - 6 hours as needed, Disp: 1 each, Rfl: 0    predniSONE 5 MG Oral Tab, Take 1 tablet (5 mg total) by mouth daily., Disp: 90 tablet, Rfl: 1    montelukast 10 MG Oral Tab, Take 1 tablet (10 mg total) by mouth nightly., Disp: 90 tablet, Rfl: 3    LORATADINE 10 MG Oral Tab, TAKE 1 TABLET BY MOUTH EVERY DAY, Disp: 90 tablet, Rfl: 1    carvedilol 25 MG Oral Tab, Take 1 tablet (25 mg total) by mouth 2 (two) times daily with meals., Disp: 180 tablet, Rfl: 3    valsartan 80 MG Oral Tab, Take 1 tablet (80 mg total) by mouth daily., Disp: 90 tablet, Rfl: 3    spironolactone 25 MG Oral Tab, Take 2 tablets (50 mg total) by mouth daily., Disp: 180 tablet, Rfl: 1    fluticasone-salmeterol (WIXELA INHUB) 250-50 MCG/ACT Inhalation Aerosol Powder, Breath Activated, Inhale 1 puff into the lungs every 12 (twelve) hours., Disp: 1 each, Rfl: 5    albuterol 108 (90 Base) MCG/ACT Inhalation Aero Soln, inhale 2 puff by inhalation route  every 4 - 6 hours as needed, Disp: 1 each, Rfl: 5    Blood Glucose Monitoring Suppl (ONETOUCH VERIO) w/Device Does not apply Kit, 1 each daily. Test 1x daily, Disp: 1 kit, Rfl: 0    Glucose Blood (ONETOUCH VERIO) In Vitro Strip, Test 1x daily, Disp: 100 strip, Rfl: 1    OneTouch Delica Lancets 33G Does not apply Misc, 4 x daily, Disp: 100 each, Rfl: 1    Blood Glucose Monitoring Suppl (BLOOD GLUCOSE SYSTEM GILLES) Does not apply Kit, DX E 11.9.  Checks every morning., Disp: 1 kit, Rfl: 0    Lancets Does not apply Misc, DX E 11.9.  Checks every morning., Disp:  50 each, Rfl: 11    Blood Glucose Monitoring Suppl w/Device Does not apply Kit, DX E 11.9.  Checks every morning., Disp: 1 kit, Rfl: 0    Blood Gluc Meter Disp-Strips Does not apply Device, DX E 11.9.  Checks every morning., Disp: 100 each, Rfl: 11    acetaminophen 500 MG Oral Tab, Take 1 tablet (500 mg total) by mouth every 6 (six) hours as needed for Pain., Disp: , Rfl:     aspirin 81 MG Oral Tab EC, Take 325 mg by mouth daily., Disp: , Rfl:     ALLERGIES:   Allergies   Allergen Reactions    Celebrex [Celecoxib] PALPITATIONS    Penicillins ITCHING and SWELLING    Metformin And Related UNKNOWN    Olmesartan UNKNOWN       SOCIAL HISTORY:   Social History     Socioeconomic History    Marital status:    Tobacco Use    Smoking status: Former     Packs/day: .25     Types: Cigarettes     Quit date:      Years since quittin.1    Smokeless tobacco: Never    Tobacco comments:     [id number]   Vaping Use    Vaping Use: Never used   Substance and Sexual Activity    Alcohol use: Never    Drug use: Never     PAST MEDICAL HISTORY:   Past Medical History:   Diagnosis Date    Anxiety state     Cancer (HCC)     breast cancer 2018    Chronic obstructive pulmonary disease, unspecified (HCC) 2023    Chronic obstructive pulmonary disease, unspecified (HCC) 2023    COPD (chronic obstructive pulmonary disease) (HCC)     Diabetes (HCC)     Essential hypertension     Exposure to medical diagnostic radiation     High blood pressure     High cholesterol     History of blood transfusion 2022    low hemoglobin    Osteoarthritis     PONV (postoperative nausea and vomiting)     Pulmonary emphysema (HCC)     Rheumatoid arthritis (HCC)        PAST SURGICAL HISTORY:   Past Surgical History:   Procedure Laterality Date    CATARACT EXTRACTION W/  INTRAOCULAR LENS IMPLANT Bilateral 2017     in [facility]     COLONOSCOPY      COLONOSCOPY N/A 2022    Procedure: COLONOSCOPY;  Surgeon: Mikey Garcia MD;  Location:  [facility] ENDOSCOPY    COLONOSCOPY N/A 06/15/2023    Procedure: COLONOSCOPY;  Surgeon: Mikey Garcia MD;  Location: [facility] ENDOSCOPY    CORRECT BUNION,SIMPLE      right foot  1993    EGD  12/21/2023    Dr. Garcia; Duodenal AVM's x4 cauterized    HYSTERECTOMY      1972, no abnormal Paps, due to heavy bleeding with fibroids.  Not sure if it had bilateral salpingo-oophorectomy.    IR ANEURYSM REPAIRM  2010    Computer assisted volumetric craniofomy with clipping of anterior cerebral artery.    LUMPECTOMY RIGHT      RADIATION RIGHT      ROTATOR CUFF REPAIR      1993    TOTAL KNEE REPLACEMENT Right 2010    TOTAL KNEE REPLACEMENT Left 07/02/2015    TRANSORAL INCISIONLESS FUNDOPLICATION - INTERNAL  01/25/2012 Fredy fundoplication at [facility] Dr. Fournier.    Fredy fundoplication at [facility] Dr. Fournier.    TRIGGER FINGER RELEASE      left hand  2005    YAG CAPSULOTOMY - OU - BOTH EYES Bilateral 09/2021    done in [place]       PHYSICAL EXAM:   Vitals:    02/15/24 1622   BP: 131/75   Pulse: 105   Weight: 142 lb (64.4 kg)       BMI:   Body mass index is 26.83 kg/m².    General Appearance:  alert, well developed, in no acute distress  Nutritional:  no extreme weight gain or loss  Head: Atraumatic  Eyes:  normal conjunctivae, sclera., normal sclera and normal pupils  Throat/Neck: normal sound to voice. Normal hearing, normal speech  Back: no kyphosis  Respiratory:  Speaking in full sentences, non-labored. no increased work of breathing, no audible wheezing    Skin:  normal moisture and skin texture, no visible lesions  Hair and nails: normal scalp hair  Hematologic:  no excessive bruising  Neuro: motor grossly intact, moving all extremities without difficulty  Psychiatric:  oriented to time, self, and place  Extremities: no obvious extremity swelling, no lesions    Diabetes Foot Exam:  Left barefoot skin diabetic exam is abnormal with pulsation pedal pulse exam abnormal  Bilateral barefoot skin diabetic  exam is normal, visualized feet and the appearance is normal.  Bilateral monofilament/sensation of both feet is normal.  Pulsation pedal pulse exam of right foot is normal.     LABS: Pertinent labs reviewed    ASSESSMENT/PLAN:    -Reviewed with patient the pathogenesis of diabetes, clinical significance of A1c, and common complications such as: microvascular, macrovascular and diabetic ketoacidosis. Patient verbalizes understanding of the importance of glycemic control and the goals of therapy.   Per ADA 2024 guidelines, it is recommended that older adults (age 65 and above) who are healthy with few coexisting chronic illnesses, intact cognitive and functional status, should have A1C goal of <7.5%, fasting or preprandial glucose of  and bedtime glucose of .     1.Type 2 Diabetes Mellitus, uncontrolled   -LAB DATA  HbA,C: 9.5% on 2024  a) Medications  -increase Glipizide 5 --> 7.5mg with breakfast   -No glipizide with dinner    -discussed to continue with low carb diet and avoiding sweets/ regular soda   - discussed to increase protein with each meal   - reviewed order to start eating foods at meal times   - reviewed protein based snack options   -discussed to continue with exercise and staying active as much as safely able   -reviewed target goal BG readings and A1C  -reviewed when to call and notify me of abnormal BG readings.     b) No nephropathy: GFR: 77 on 2/3/2024 and urine MA: 120.7 on 3/10/2023   c) UTD with optho - has apt on - with Dr. Melo   D)foot exam: abnormal today 2/15/2024  e) cont. testing BG readings 1x daily- discussed to start alternating testing times with fasting or 2hrs after meals.   f) Life style changes reviewed    2. Hypertension   - normotensive today     2.dyslipidemia   -LDL: 102 and Tri on 2023  -Atorvastatin 80mg at bedtime      RTC in 3 months   Patient instructed to call sooner if they develop Blood glucose readings <75 and/or if they have  readings persistently >200.     The risks and benefits of my recommendations were discussed with the patient today. questions were also answered to the best of my knowledge. Patient verbalizes understanding of these issues and agrees to the plan.    2/15/2024  BILL Swain

## 2024-02-15 NOTE — PATIENT INSTRUCTIONS
A1C: 9.5% on 2/2/2024 --> increased from 7.0% on 10/18/2023  Blood glucose: 194 in clinic today    Medications:   -increase Glipizide 5 --> 7.5mg with breakfast      No glipizide with dinner    - incorporate protein with all meals consumed     Let's start to eat meals in the following order:   - veggies first (preferably high in fiber)   - protein second (preferably lean - skinless chicken/fish/turkey)   - carbohydrates last (preferably complex- whole wheat products)

## 2024-02-16 ENCOUNTER — NURSE ONLY (OUTPATIENT)
Dept: HEMATOLOGY/ONCOLOGY | Facility: HOSPITAL | Age: 84
End: 2024-02-16
Attending: INTERNAL MEDICINE
Payer: MEDICARE

## 2024-02-16 ENCOUNTER — OFFICE VISIT (OUTPATIENT)
Dept: INTERNAL MEDICINE CLINIC | Facility: CLINIC | Age: 84
End: 2024-02-16

## 2024-02-16 ENCOUNTER — TELEPHONE (OUTPATIENT)
Dept: INTERNAL MEDICINE CLINIC | Facility: CLINIC | Age: 84
End: 2024-02-16

## 2024-02-16 VITALS
WEIGHT: 143.19 LBS | OXYGEN SATURATION: 98 % | DIASTOLIC BLOOD PRESSURE: 77 MMHG | TEMPERATURE: 98 F | BODY MASS INDEX: 27.03 KG/M2 | HEART RATE: 101 BPM | SYSTOLIC BLOOD PRESSURE: 140 MMHG | HEIGHT: 61 IN

## 2024-02-16 DIAGNOSIS — D50.0 IRON DEFICIENCY ANEMIA DUE TO CHRONIC BLOOD LOSS: Primary | ICD-10-CM

## 2024-02-16 DIAGNOSIS — R60.0 PERIPHERAL EDEMA: ICD-10-CM

## 2024-02-16 DIAGNOSIS — R41.3 MEMORY LOSS: ICD-10-CM

## 2024-02-16 DIAGNOSIS — I10 PRIMARY HYPERTENSION: ICD-10-CM

## 2024-02-16 DIAGNOSIS — Z45.2 ENCOUNTER FOR CARE RELATED TO VASCULAR ACCESS PORT: ICD-10-CM

## 2024-02-16 DIAGNOSIS — Z71.85 VACCINE COUNSELING: Primary | ICD-10-CM

## 2024-02-16 DIAGNOSIS — K90.9 MALABSORPTION OF IRON: ICD-10-CM

## 2024-02-16 DIAGNOSIS — R09.89 DIMINISHED PULSES IN LOWER EXTREMITY: ICD-10-CM

## 2024-02-16 DIAGNOSIS — D50.0 IRON DEFICIENCY ANEMIA DUE TO CHRONIC BLOOD LOSS: ICD-10-CM

## 2024-02-16 DIAGNOSIS — E87.6 HYPOKALEMIA: ICD-10-CM

## 2024-02-16 DIAGNOSIS — E11.9 TYPE 2 DIABETES MELLITUS WITHOUT RETINOPATHY (HCC): ICD-10-CM

## 2024-02-16 PROBLEM — R53.1 WEAKNESS GENERALIZED: Status: RESOLVED | Noted: 2024-02-02 | Resolved: 2024-02-16

## 2024-02-16 PROBLEM — N39.0 URINARY TRACT INFECTION WITHOUT HEMATURIA, SITE UNSPECIFIED: Status: RESOLVED | Noted: 2024-02-02 | Resolved: 2024-02-16

## 2024-02-16 PROBLEM — K62.5 RECTAL BLEEDING: Status: RESOLVED | Noted: 2023-05-31 | Resolved: 2024-02-16

## 2024-02-16 PROBLEM — J81.0 ACUTE PULMONARY EDEMA (HCC): Status: RESOLVED | Noted: 2024-02-02 | Resolved: 2024-02-16

## 2024-02-16 LAB
ALBUMIN SERPL-MCNC: 3.5 G/DL (ref 3.2–4.8)
ALBUMIN/GLOB SERPL: 1 {RATIO} (ref 1–2)
ALP LIVER SERPL-CCNC: 71 U/L
ALT SERPL-CCNC: 10 U/L
ANION GAP SERPL CALC-SCNC: 8 MMOL/L (ref 0–18)
AST SERPL-CCNC: 23 U/L (ref ?–34)
BILIRUB SERPL-MCNC: 0.2 MG/DL (ref 0.2–1.1)
BUN BLD-MCNC: 23 MG/DL (ref 9–23)
BUN/CREAT SERPL: 22.5 (ref 10–20)
CALCIUM BLD-MCNC: 8 MG/DL (ref 8.7–10.4)
CHLORIDE SERPL-SCNC: 107 MMOL/L (ref 98–112)
CO2 SERPL-SCNC: 24 MMOL/L (ref 21–32)
CREAT BLD-MCNC: 1.02 MG/DL
EGFRCR SERPLBLD CKD-EPI 2021: 54 ML/MIN/1.73M2 (ref 60–?)
FASTING STATUS PATIENT QL REPORTED: NO
GLOBULIN PLAS-MCNC: 3.5 G/DL (ref 2.8–4.4)
GLUCOSE BLD-MCNC: 268 MG/DL (ref 70–99)
MAGNESIUM SERPL-MCNC: 1 MG/DL (ref 1.6–2.6)
OSMOLALITY SERPL CALC.SUM OF ELEC: 301 MOSM/KG (ref 275–295)
POTASSIUM SERPL-SCNC: 3.3 MMOL/L (ref 3.5–5.1)
PROT SERPL-MCNC: 7 G/DL (ref 5.7–8.2)
SODIUM SERPL-SCNC: 139 MMOL/L (ref 136–145)

## 2024-02-16 PROCEDURE — 3078F DIAST BP <80 MM HG: CPT | Performed by: INTERNAL MEDICINE

## 2024-02-16 PROCEDURE — 99495 TRANSJ CARE MGMT MOD F2F 14D: CPT | Performed by: INTERNAL MEDICINE

## 2024-02-16 PROCEDURE — 1126F AMNT PAIN NOTED NONE PRSNT: CPT | Performed by: INTERNAL MEDICINE

## 2024-02-16 PROCEDURE — 80061 LIPID PANEL: CPT | Performed by: INTERNAL MEDICINE

## 2024-02-16 PROCEDURE — 90677 PCV20 VACCINE IM: CPT | Performed by: INTERNAL MEDICINE

## 2024-02-16 PROCEDURE — 3008F BODY MASS INDEX DOCD: CPT | Performed by: INTERNAL MEDICINE

## 2024-02-16 PROCEDURE — 3077F SYST BP >= 140 MM HG: CPT | Performed by: INTERNAL MEDICINE

## 2024-02-16 PROCEDURE — G0009 ADMIN PNEUMOCOCCAL VACCINE: HCPCS | Performed by: INTERNAL MEDICINE

## 2024-02-16 PROCEDURE — 1159F MED LIST DOCD IN RCRD: CPT | Performed by: INTERNAL MEDICINE

## 2024-02-16 PROCEDURE — 83735 ASSAY OF MAGNESIUM: CPT | Performed by: INTERNAL MEDICINE

## 2024-02-16 PROCEDURE — 80053 COMPREHEN METABOLIC PANEL: CPT | Performed by: INTERNAL MEDICINE

## 2024-02-16 PROCEDURE — 36591 DRAW BLOOD OFF VENOUS DEVICE: CPT

## 2024-02-16 PROCEDURE — 1160F RVW MEDS BY RX/DR IN RCRD: CPT | Performed by: INTERNAL MEDICINE

## 2024-02-16 PROCEDURE — 1111F DSCHRG MED/CURRENT MED MERGE: CPT | Performed by: INTERNAL MEDICINE

## 2024-02-16 RX ORDER — HEPARIN SODIUM (PORCINE) LOCK FLUSH IV SOLN 100 UNIT/ML 100 UNIT/ML
5 SOLUTION INTRAVENOUS ONCE
OUTPATIENT
Start: 2024-02-16

## 2024-02-16 RX ORDER — TORSEMIDE 20 MG/1
20 TABLET ORAL DAILY
Qty: 90 TABLET | Refills: 1 | Status: SHIPPED | OUTPATIENT
Start: 2024-02-16

## 2024-02-16 RX ORDER — POTASSIUM CHLORIDE 1500 MG/1
20 TABLET, EXTENDED RELEASE ORAL 2 TIMES DAILY
Qty: 6 TABLET | Refills: 0 | Status: SHIPPED | OUTPATIENT
Start: 2024-02-16

## 2024-02-16 RX ORDER — SODIUM CHLORIDE 9 MG/ML
10 INJECTION, SOLUTION INTRAMUSCULAR; INTRAVENOUS; SUBCUTANEOUS ONCE
OUTPATIENT
Start: 2024-02-16

## 2024-02-16 RX ORDER — HEPARIN SODIUM (PORCINE) LOCK FLUSH IV SOLN 100 UNIT/ML 100 UNIT/ML
5 SOLUTION INTRAVENOUS ONCE
Status: COMPLETED | OUTPATIENT
Start: 2024-02-16 | End: 2024-02-16

## 2024-02-16 RX ADMIN — HEPARIN SODIUM (PORCINE) LOCK FLUSH IV SOLN 100 UNIT/ML 500 UNITS: 100 SOLUTION INTRAVENOUS at 13:45:00

## 2024-02-16 NOTE — TELEPHONE ENCOUNTER
Spoke with Bella from [facility],  verified  She reported pt critical lab for magnesium 1.0, see below result.   pls advise, thanks in advance.           Result   Magnesium (Order [id number])   Contains critical data Magnesium  Order: [id number]  Collected 2024  1:43 PM       Status: Final result       Dx: Hypokalemia    0 Result Notes      Component  Ref Range & Units 24  1:43 PM   Magnesium  1.6 - 2.6 mg/dL 1.0 Low Panic

## 2024-02-16 NOTE — TELEPHONE ENCOUNTER
F/u call to site staff , spoke with Carito, she was informed to let Dr Sorto know about pt critical labs.   She stated understanding.

## 2024-02-16 NOTE — PATIENT INSTRUCTIONS
ASSESSMENT/PLAN:     Encounter Diagnoses   Name Primary?    Vaccine counseling PCV 20 today. Holding shingrix.    Yes    Type 2 diabetes mellitus without retinopathy (HCC) HIgher. FU endoc. Careful with diet and excercise at least 30 minutes 3-4 times a week. Check sugars at different times on different dates. Careful with low sugars. Carry something with you and check sugar if can. Can carry hanh cracker, etc. Decrease carbohydrates. But also, careful with fruits and natural sugars.One serving a day and no more than 1 handful every day. Check feet  every AM and careful with sores and ulcers on feet bilaterally. Check eyes every year with dilated eye exam.  Check sugars.  2-hour postmeal should be less than 140s.  Pre-meal should be 's.  Both equally affected A1c.  Discussed importance of glycemic control to prevent complications of diabetes  -Discussed complications of diabetes include retinopathy, neuropathy, nephropathy and cardiovascular disease  -Discussed ABCs of DM  -Discussed importance of SBGM  -Discussed importance of low CHO diet, recommend 45gm per meal or 135gm per day  -UTD with optho       Primary hypertension HIgher. Add torsemide every day for 3 days and then as needed. Call with blood pressures in 2 weeks. Careful with diet and excercise at least 30 minutes 3-4 times a week. Check blood pressures at different times on different days. Can purchase own blood pressure monitor. If not, check at local pharmacy. Bake foods more and grill occasionally. Avoid fried foods. No salt. Use other seasonings.         Hypokalemia Take potassium 2 times a day for 3 days. Check blood in 1 week. Take magnesium 400 once a day. Check blood in 1 week.        Iron deficiency anemia due to chronic blood loss Stable.        Diminished pulses in lower extremity Check VANIA.        Peripheral edema Elevate. Check stress test. Going Home    In this section you will find the tools which will guide you through the first  few days after you leave the hospital. Continued use of these tools will help you develop the skills necessary to keep your heart failure under control.     Heart Failure Guidelines - place this worksheet on your refrigerator or somewhere you can refer to it daily to help you decide if your symptoms are under control, and what to do if they are not.     Home Care Instructions Following Heart Failure - the most important things to do every day include:     Weigh yourself  Take your medicines as prescribed  Limit your sodium (salt) and fluid intake  Know when to call your cardiologist, primary doctor, or nurse  Know when to seek emergency care    There is also a handy weight chart on which to record your weight every day, information on measuring fluids and limiting fluid intake, and a section for recording your thoughts or questions.     Things for You to Remember:   1. An appointment has been made for you to see your doctor or healthcare provider within 7 days of hospital discharge. It is important that you attend this appointment to make sure your symptoms are under control.     2. Your recommended sodium intake is 8399-6322 mg daily    3. Limit your fluid intake to no more than 2 liters or 64 ounces per day    4. Some exercise and activity is important to help keep your heart functioning and strong. Unless instructed not to exercise, you may walk at a slow to moderate pace for 10-15 minutes 2-3 days per week to start. Pace your activity to prevent shortness of breath or fatigue. Stop exercise if you develop chest pain, lightheadedness, or significant shortness of breath.       Call Your Cardiologist If:   You gain 2 pounds overnight or 3-4 pounds in 3-5 days  You have more difficulty breathing  You are getting more tired with normal activity  You are more short of breath lying down, or awaken at night short of breath  You have swelling of your feet or legs  You urinate less often during the day and more often at  night  You have cramps in your legs  You have blurred vision or see yellowish-green halos around objects of lights    Go to the Emergency Room If:   You have pain or tightness in your chest  You are extremely short of breath  You are coughing up pink-frothy mucus  You are traveling and develop symptoms of worsening heart failure    Additional Resources:   If you have questions or concerns about heart failure management, call the [facility] Heart Failure Unit at [phone]           Orders Placed This Encounter   Procedures    Basic Metabolic Panel (8)    Magnesium    Prevnar 20 (PCV20) [80646]       Meds This Visit:  Requested Prescriptions     Signed Prescriptions Disp Refills    Potassium Chloride ER 20 MEQ Oral Tab CR 6 tablet 0     Sig: Take 20 mEq by mouth 2 (two) times daily.    torsemide 20 MG Oral Tab 90 tablet 1     Sig: Take 1 tablet (20 mg total) by mouth daily.       Imaging & Referrals:  PCV20 VACCINE FOR INTRAMUSCULAR USE  US ANKLE BRACHIAL INDEX (CPT=93922)        Has set nayely't.

## 2024-02-16 NOTE — PROGRESS NOTES
HPI:    Patient ID: Chester Bautista is a 83 year old female.    Chief Complaint   Patient presents with    Hospital F/U     Patient states he is here for a hospital follow up     Admitted 2-2-24  Discharged: 2-4-24.     Saw oncologist and low potassium and high BP.   Pt is an 84 y/o female with PMH of COPD, DM, HTN and iron deficiency anemia who presented to the ED from hematologist office for elevated blood pressure. BP in the /100, CXR with diffuse bilateral pulmonary opacities, K 2.6 and +PCT. Cardiology was consulted and she was admitted for further eval and treatment.    Results:            Lab Results   Component Value Date     WBC 9.8 02/03/2024     HGB 10.4 (L) 02/03/2024     HCT 32.3 (L) 02/03/2024     .0 02/03/2024     CREATSERUM 0.76 02/03/2024     BUN 11 02/03/2024      02/03/2024     K 3.9 02/03/2024     K 3.9 02/03/2024      02/03/2024     CO2 30.0 02/03/2024      (H) 02/03/2024     CA 9.2 02/03/2024     ALB 3.1 (L) 02/03/2024     ALKPHO 74 02/03/2024     BILT 0.3 02/03/2024     TP 6.4 02/03/2024     AST 21 02/03/2024     ALT 7 (L) 02/03/2024     PTT 30.8 12/14/2023     INR 0.93 12/14/2023     TSH 0.622 10/20/2023     ESRML 91 (H) 09/14/2023     CRP 1.10 (H) 09/14/2023     TROPHS 22 02/03/2024     B12 748 10/20/2023      US VENOUS DOPPLER LEG RIGHT - DIAG IMG (CPT=93971)     Result Date: 2/2/2024  CONCLUSION:        Normal examination.     Dictated by (CST): Reji Bonds MD on 2/02/2024 at 3:15 PM     Finalized by (CST): Reji Bonds MD on 2/02/2024 at 3:15 PM           XR CHEST AP PORTABLE  (CPT=71045)     Result Date: 2/2/2024  CONCLUSION:          Multifocal opacities throughout both lungs, which is concerning for multifocal pneumonia or less likely pulmonary edema.  Recommend follow-up chest radiograph in 4-6 weeks to monitor for resolution.  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     Dictated by (CST): Joe Deleon MD on 2/02/2024 at 3:08 PM     Finalized by (CST): Joe Deleon MD on 2/02/2024 at 3:15 PM         EKG 12 Lead     Result Date: 2/3/2024  Normal sinus rhythm Possible Left atrial enlargement Minimal voltage criteria for LVH, may be normal variant ( R in aVL ) Possible Anterior infarct , age undetermined Abnormal ECG No previous ECGs found in [facility] Confirmed by DAXA FRAZIER ([id number]) on 2/3/2024 11:51:33 AM      Assessment/Plan:   *Pneumonia  CXR: multifocal opacities throughout both lungs   +PCT  Afebrile/no leukocytosis  Cont rocephin  On 2L NC - wean as tolerated   Repeat CXR  Monitor      *Hypokalemia  K 2.6 -->  Electrolyte protocol  Monitor labs      *Vaginal pruritus  Cont diflucan                  Patient here for follow up of Diabetes.  Has been taking medications regularly.    Checks sugars 2 times daily.  Fasting sugars average 100-130's. 2 hrs. PP in after noon: 200's. No lows.    Watches diabetic diet, low salt.  Diabetic Flow sheet      Latest Ref Rng & Units 2/18/2024     4:29 AM 2/16/2024     2:30 PM 2/16/2024     1:43 PM 2/15/2024    10:01 PM   DIABETIC FLOWSHEET (Critical access hospital)   BMI   27.06 kg/m2     Cholesterol Total <200 mg/dL   130     Triglycerides 30 - 149 mg/dL   181     HDL 40 - 59 mg/dL   43     LDL <100 mg/dL   57     Valsartan  80 mg Daily OR 80 mg Daily OR  80 mg Daily OR  80 mg Daily OR    Weight (enc vitals)   143 lb 3.2 oz     BP (enc vitals)   140/77     Last Foot Exam     2/15/2024     HISTORY OF DIABETES COMPLICATIONS: :  History of Retinopathy: No.   History of Neuropathy: No.   History of Nephropathy: Yes.      ASSOCIATED COMPLICATIONS:   HTN: Yes.   Hyperlipidemia: Yes.   Coronary Artery Disease:  CAD,  HF, PAD  Cerebrovascular Disease: Yes. On statin and ARB, BB.      MEALS:  3 meals a day  Cooks at home    Hypertension  Patient is here for follow up of hypertension.  BP at home: 1401-160/70-90's. Different times.   Has been compliant with medications.  Exercise level: not active and has been following low salt diet.  Weight has been stable. Cut back on pop and ice cream.   Wt Readings from Last 3 Encounters:   02/16/24 143 lb 3.2 oz (65 kg)   02/15/24 142 lb (64.4 kg)   02/04/24 141 lb 4.8 oz (64.1 kg)     BP Readings from Last 3 Encounters:   02/16/24 140/77   02/15/24 131/75   02/04/24 123/54     Labs:   Lab Results   Component Value Date/Time     (H) 02/16/2024 01:43 PM     02/16/2024 01:43 PM    K 3.3 (L) 02/16/2024 01:43 PM     02/16/2024 01:43 PM    CO2 24.0 02/16/2024 01:43 PM    CREATSERUM 1.02 02/16/2024 01:43 PM    CA 8.0 (L) 02/16/2024 01:43 PM    AST 23 02/16/2024 01:43 PM    ALT 10 02/16/2024 01:43 PM    TSH 0.622 10/20/2023 01:38 PM        Lab Results   Component Value Date/Time    CHOLEST 130 02/16/2024 01:43 PM    HDL 43 02/16/2024 01:43 PM    TRIG 181 (H) 02/16/2024 01:43 PM    LDL 57 02/16/2024 01:43 PM    NONHDLC 87 02/16/2024 01:43 PM            Wt Readings from Last 3 Encounters:   02/16/24 143 lb 3.2 oz (65 kg)   02/15/24 142 lb (64.4 kg)   02/04/24 141 lb 4.8 oz (64.1 kg)     BP Readings from Last 3 Encounters:   02/16/24 140/77   02/15/24 131/75   02/04/24 123/54     Labs:   Lab Results   Component Value Date/Time     (H) 02/16/2024 01:43 PM     02/16/2024 01:43 PM    K 3.3 (L) 02/16/2024 01:43 PM     02/16/2024 01:43 PM    CO2 24.0 02/16/2024 01:43 PM    CREATSERUM 1.02 02/16/2024 01:43 PM    CA 8.0 (L) 02/16/2024 01:43 PM    AST 23 02/16/2024 01:43 PM    ALT 10 02/16/2024 01:43 PM    TSH 0.622 10/20/2023 01:38 PM        Lab Results   Component Value Date/Time    CHOLEST 130 02/16/2024 01:43 PM    HDL 43 02/16/2024 01:43 PM    TRIG 181 (H) 02/16/2024 01:43 PM    LDL 57 02/16/2024 01:43 PM    NONHDLC 87 02/16/2024 01:43 PM          HPI      Review of Systems   Constitutional:  Negative for activity change, appetite  change, chills, diaphoresis, fatigue, fever and unexpected weight change.   Respiratory:  Negative for apnea, cough, choking, chest tightness, shortness of breath, wheezing and stridor.    Cardiovascular:  Positive for leg swelling. Negative for chest pain and palpitations.   Gastrointestinal:  Negative for abdominal distention and abdominal pain.   Endocrine: Negative for cold intolerance, heat intolerance, polydipsia, polyphagia and polyuria.   Neurological:  Negative for dizziness, tremors, seizures, syncope, facial asymmetry, speech difficulty, weakness, light-headedness, numbness and headaches.   All other systems reviewed and are negative.        Current Outpatient Medications   Medication Sig Dispense Refill    Potassium Chloride ER 20 MEQ Oral Tab CR Take 20 mEq by mouth 2 (two) times daily. 6 tablet 0    torsemide 20 MG Oral Tab Take 1 tablet (20 mg total) by mouth daily. 90 tablet 1    glipiZIDE 5 MG Oral Tab Take 1.5 tablets (7.5 mg total) by mouth daily with breakfast.      Accu-Chek FastClix Lancets Does not apply Misc Check sugar once daily as directed (E11.42) 100 each 2    hydroxychloroquine 200 MG Oral Tab Take 1 tablet (200 mg total) by mouth daily. 90 tablet 1    NIFEdipine ER 60 MG Oral Tablet 24 Hr Take 1 tablet (60 mg total) by mouth daily. 30 tablet 1    Glucose Blood (ACCU-CHEK GUIDE) In Vitro Strip Check blood sugar twice a day (fasting and before dinner). DX: E11.42, NIDDM 200 strip 1    rosuvastatin (CRESTOR) 20 MG Oral Tab Take 1 tablet (20 mg total) by mouth nightly. 90 tablet 0    omeprazole 20 MG Oral Capsule Delayed Release Take 1 capsule (20 mg total) by mouth every morning. 90 capsule 1    cyclobenzaprine 10 MG Oral Tab Take 1 tablet (10 mg total) by mouth nightly as needed for Muscle spasms. 90 tablet 1    glycopyrrolate 1 MG Oral Tab Take 1 tablet (1 mg total) by mouth 3 (three) times daily. 90 tablet 1    albuterol 108 (90 Base) MCG/ACT Inhalation Aero Soln inhale 2 puff by  inhalation route  every 4 - 6 hours as needed 1 each 0    predniSONE 5 MG Oral Tab Take 1 tablet (5 mg total) by mouth daily. 90 tablet 1    montelukast 10 MG Oral Tab Take 1 tablet (10 mg total) by mouth nightly. 90 tablet 3    LORATADINE 10 MG Oral Tab TAKE 1 TABLET BY MOUTH EVERY DAY 90 tablet 1    carvedilol 25 MG Oral Tab Take 1 tablet (25 mg total) by mouth 2 (two) times daily with meals. 180 tablet 3    valsartan 80 MG Oral Tab Take 1 tablet (80 mg total) by mouth daily. 90 tablet 3    spironolactone 25 MG Oral Tab Take 2 tablets (50 mg total) by mouth daily. 180 tablet 1    fluticasone-salmeterol (WIXELA INHUB) 250-50 MCG/ACT Inhalation Aerosol Powder, Breath Activated Inhale 1 puff into the lungs every 12 (twelve) hours. 1 each 5    albuterol 108 (90 Base) MCG/ACT Inhalation Aero Soln inhale 2 puff by inhalation route  every 4 - 6 hours as needed 1 each 5    Blood Glucose Monitoring Suppl (ONETOUCH VERIO) w/Device Does not apply Kit 1 each daily. Test 1x daily 1 kit 0    Glucose Blood (ONETOUCH VERIO) In Vitro Strip Test 1x daily 100 strip 1    OneTouch Delica Lancets 33G Does not apply Misc 4 x daily 100 each 1    Blood Glucose Monitoring Suppl (BLOOD GLUCOSE SYSTEM GILLES) Does not apply Kit DX E 11.9.  Checks every morning. 1 kit 0    Lancets Does not apply Misc DX E 11.9.  Checks every morning. 50 each 11    Blood Glucose Monitoring Suppl w/Device Does not apply Kit DX E 11.9.  Checks every morning. 1 kit 0    Blood Gluc Meter Disp-Strips Does not apply Device DX E 11.9.  Checks every morning. 100 each 11    acetaminophen 500 MG Oral Tab Take 1 tablet (500 mg total) by mouth every 6 (six) hours as needed for Pain.      aspirin 81 MG Oral Tab EC Take 325 mg by mouth daily.       Allergies:  Allergies   Allergen Reactions    Celebrex [Celecoxib] PALPITATIONS    Penicillins ITCHING and SWELLING    Metformin And Related UNKNOWN    Olmesartan UNKNOWN       HISTORY:  Past Medical History:   Diagnosis Date     Anxiety state     Cancer (HCC)     breast cancer 2018    Chronic obstructive pulmonary disease, unspecified (HCC) 05/04/2023    Chronic obstructive pulmonary disease, unspecified (HCC) 05/04/2023    COPD (chronic obstructive pulmonary disease) (HCC)     Diabetes (HCC)     Essential hypertension     Exposure to medical diagnostic radiation     High blood pressure     High cholesterol     History of blood transfusion 07/2022    low hemoglobin    Osteoarthritis     PONV (postoperative nausea and vomiting)     Pulmonary emphysema (HCC)     Rheumatoid arthritis (HCC)       Past Surgical History:   Procedure Laterality Date    CATARACT EXTRACTION W/  INTRAOCULAR LENS IMPLANT Bilateral 2017    [facility]     COLONOSCOPY      COLONOSCOPY N/A 07/21/2022    Procedure: COLONOSCOPY;  Surgeon: Mikey Garcia MD;  Location: [facility] ENDOSCOPY    COLONOSCOPY N/A 06/15/2023    Procedure: COLONOSCOPY;  Surgeon: Mikey Garcia MD;  Location: [facility] ENDOSCOPY    CORRECT BUNION,SIMPLE      right foot  1993    EGD  12/21/2023    Dr. Garcia; Duodenal AVM's x4 cauterized    HYSTERECTOMY      1972, no abnormal Paps, due to heavy bleeding with fibroids.  Not sure if it had bilateral salpingo-oophorectomy.    IR ANEURYSM REPAIRM  2010    Computer assisted volumetric craniofomy with clipping of anterior cerebral artery.    LUMPECTOMY RIGHT      RADIATION RIGHT      ROTATOR CUFF REPAIR      1993    TOTAL KNEE REPLACEMENT Right 2010    TOTAL KNEE REPLACEMENT Left 07/02/2015    TRANSORAL INCISIONLESS FUNDOPLICATION - INTERNAL  01/25/2012 Fredy fundoplication at [facility] Dr. Fournier.    Fredy fundoplication at [facility] Dr. Fournier.    TRIGGER FINGER RELEASE      left hand  2005    YAG CAPSULOTOMY - OU - BOTH EYES Bilateral 09/2021    done in [place]      Family History   Problem Relation Age of Onset    Heart Surgery Mother         Leaky valve at 41 y/o.     Prostate Cancer Brother     Cancer Brother     Diabetes  Maternal Grandmother     Diabetes Paternal Aunt     Breast Cancer Self 78    Breast Cancer Niece         before 50    Macular degeneration Neg     Glaucoma Neg       Social History:   Social History     Socioeconomic History    Marital status:    Tobacco Use    Smoking status: Former     Packs/day: .25     Types: Cigarettes     Quit date:      Years since quittin.1    Smokeless tobacco: Never    Tobacco comments:     [id number]   Vaping Use    Vaping Use: Never used   Substance and Sexual Activity    Alcohol use: Never    Drug use: Never   Social History Narrative    Just moved in from [place].   passed away and that is why moved from [place] to be with daughter who lives in [place].  Currently lives with daughter.     Social Determinants of Health     Financial Resource Strain: Low Risk  (2024)    Financial Resource Strain     Difficulty of Paying Living Expenses: Not hard at all     Med Affordability: No   Food Insecurity: No Food Insecurity (2024)    Food Insecurity     Food Insecurity: Never true   Transportation Needs: No Transportation Needs (2024)    Transportation Needs     Lack of Transportation: No   Housing Stability: Low Risk  (2024)    Housing Stability     Housing Instability: No        PHYSICAL EXAM:   /77 (BP Location: Right arm, Patient Position: Sitting, Cuff Size: adult)   Pulse 101   Temp 98.3 °F (36.8 °C) (Oral)   Ht 5' 1\" (1.549 m)   Wt 143 lb 3.2 oz (65 kg)   SpO2 98%   BMI 27.06 kg/m²   BP Readings from Last 3 Encounters:   24 140/77   02/15/24 131/75   24 123/54     Wt Readings from Last 3 Encounters:   24 143 lb 3.2 oz (65 kg)   02/15/24 142 lb (64.4 kg)   24 141 lb 4.8 oz (64.1 kg)       Physical Exam  Vitals and nursing note reviewed.   Constitutional:       General: She is not in acute distress.     Appearance: Normal appearance. She is well-developed. She is not ill-appearing, toxic-appearing or  diaphoretic.      Interventions: She is not intubated.  Neck:      Thyroid: No thyroid mass or thyromegaly.      Trachea: Trachea and phonation normal.   Cardiovascular:      Rate and Rhythm: Normal rate and regular rhythm.      Pulses: No decreased pulses.           Carotid pulses are 2+ on the right side and 2+ on the left side.       Radial pulses are 2+ on the right side and 2+ on the left side.        Dorsalis pedis pulses are 1+ on the right side and 1+ on the left side.        Posterior tibial pulses are 1+ on the right side and 1+ on the left side.      Heart sounds: Normal heart sounds, S1 normal and S2 normal.      Comments: No sores on feet.   Pulmonary:      Effort: Pulmonary effort is normal. No tachypnea, bradypnea, accessory muscle usage, prolonged expiration, respiratory distress or retractions. She is not intubated.      Breath sounds: Normal breath sounds and air entry. No stridor, decreased air movement or transmitted upper airway sounds. No decreased breath sounds, wheezing, rhonchi or rales.   Chest:      Chest wall: No tenderness.   Abdominal:      General: There is no distension.      Palpations: Abdomen is soft.      Tenderness: There is no abdominal tenderness.   Musculoskeletal:      Right lower le+ Pitting Edema present.      Left lower le+ Pitting Edema present.   Skin:     General: Skin is warm and dry.   Neurological:      Mental Status: She is alert and oriented to person, place, and time.   Psychiatric:         Behavior: Behavior normal. Behavior is cooperative.         Thought Content: Thought content normal.              ASSESSMENT/PLAN:     Encounter Diagnoses   Name Primary?    Vaccine counseling PCV 20 today. Holding shingrix.    Yes    Type 2 diabetes mellitus without retinopathy (HCC) HIgher. FU endoc. Careful with diet and excercise at least 30 minutes 3-4 times a week. Check sugars at different times on different dates. Careful with low sugars. Carry something with  you and check sugar if can. Can carry hanh cracker, etc. Decrease carbohydrates. But also, careful with fruits and natural sugars.One serving a day and no more than 1 handful every day. Check feet  every AM and careful with sores and ulcers on feet bilaterally. Check eyes every year with dilated eye exam.  Check sugars.  2-hour postmeal should be less than 140s.  Pre-meal should be 's.  Both equally affected A1c.  Discussed importance of glycemic control to prevent complications of diabetes  -Discussed complications of diabetes include retinopathy, neuropathy, nephropathy and cardiovascular disease  -Discussed ABCs of DM  -Discussed importance of SBGM  -Discussed importance of low CHO diet, recommend 45gm per meal or 135gm per day  -UTD with optho       Primary hypertension HIgher. Add torsemide every day for 3 days and then as needed. Call with blood pressures in 2 week.s Careful with diet and excercise at least 30 minutes 3-4 times a week. Check blood pressures at different times on different days. Can purchase own blood pressure monitor. If not, check at local pharmacy. Bake foods more and grill occasionally. Avoid fried foods. No salt. Use other seasonings.         Hypokalemia Take potassium 2 times a day for 3 days. Check blood in 1 week. Take magnesium 400 once a day. Check blood in 1 week.        Iron deficiency anemia due to chronic blood loss Stable.        Diminished pulses in lower extremity Check VANIA.        Peripheral edema Elevate. Check stress test. Not able to walk on treadmill. Going Home    In this section you will find the tools which will guide you through the first few days after you leave the hospital. Continued use of these tools will help you develop the skills necessary to keep your heart failure under control.     Heart Failure Guidelines - place this worksheet on your refrigerator or somewhere you can refer to it daily to help you decide if your symptoms are under control, and what  to do if they are not.     Home Care Instructions Following Heart Failure - the most important things to do every day include:     Weigh yourself  Take your medicines as prescribed  Limit your sodium (salt) and fluid intake  Know when to call your cardiologist, primary doctor, or nurse  Know when to seek emergency care    There is also a handy weight chart on which to record your weight every day, information on measuring fluids and limiting fluid intake, and a section for recording your thoughts or questions.     Things for You to Remember:   1. An appointment has been made for you to see your doctor or healthcare provider within 7 days of hospital discharge. It is important that you attend this appointment to make sure your symptoms are under control.     2. Your recommended sodium intake is 2837-8455 mg daily    3. Limit your fluid intake to no more than 2 liters or 64 ounces per day    4. Some exercise and activity is important to help keep your heart functioning and strong. Unless instructed not to exercise, you may walk at a slow to moderate pace for 10-15 minutes 2-3 days per week to start. Pace your activity to prevent shortness of breath or fatigue. Stop exercise if you develop chest pain, lightheadedness, or significant shortness of breath.       Call Your Cardiologist If:   You gain 2 pounds overnight or 3-4 pounds in 3-5 days  You have more difficulty breathing  You are getting more tired with normal activity  You are more short of breath lying down, or awaken at night short of breath  You have swelling of your feet or legs  You urinate less often during the day and more often at night  You have cramps in your legs  You have blurred vision or see yellowish-green halos around objects of lights    Go to the Emergency Room If:   You have pain or tightness in your chest  You are extremely short of breath  You are coughing up pink-frothy mucus  You are traveling and develop symptoms of worsening heart  failure    Additional Resources:   If you have questions or concerns about heart failure management, call the [facility] Heart Failure Unit at [phone]           Orders Placed This Encounter   Procedures    Basic Metabolic Panel (8)    Magnesium    Prevnar 20 (PCV20) [12146]       Meds This Visit:  Requested Prescriptions     Signed Prescriptions Disp Refills    Potassium Chloride ER 20 MEQ Oral Tab CR 6 tablet 0     Sig: Take 20 mEq by mouth 2 (two) times daily.    torsemide 20 MG Oral Tab 90 tablet 1     Sig: Take 1 tablet (20 mg total) by mouth daily.       Imaging & Referrals:  PCV20 VACCINE FOR INTRAMUSCULAR USE  [facility] CARDIOLOGY EXTERNAL  NEURO - INTERNAL  US ANKLE BRACHIAL INDEX (CPT=93922)        Has set nayely't.

## 2024-02-23 ENCOUNTER — HOSPITAL ENCOUNTER (OUTPATIENT)
Age: 84
Discharge: ANOTHER HOSPITAL | End: 2024-02-23
Payer: MEDICARE

## 2024-02-23 ENCOUNTER — APPOINTMENT (OUTPATIENT)
Dept: GENERAL RADIOLOGY | Facility: HOSPITAL | Age: 84
End: 2024-02-23
Payer: MEDICARE

## 2024-02-23 ENCOUNTER — OFFICE VISIT (OUTPATIENT)
Dept: HEMATOLOGY/ONCOLOGY | Facility: HOSPITAL | Age: 84
End: 2024-02-23
Attending: INTERNAL MEDICINE
Payer: MEDICARE

## 2024-02-23 ENCOUNTER — HOSPITAL ENCOUNTER (INPATIENT)
Facility: HOSPITAL | Age: 84
LOS: 18 days | Discharge: HOME HEALTH | End: 2024-03-12
Attending: EMERGENCY MEDICINE | Admitting: INTERNAL MEDICINE
Payer: MEDICARE

## 2024-02-23 ENCOUNTER — APPOINTMENT (OUTPATIENT)
Dept: ULTRASOUND IMAGING | Facility: HOSPITAL | Age: 84
End: 2024-02-23
Attending: INTERNAL MEDICINE
Payer: MEDICARE

## 2024-02-23 ENCOUNTER — APPOINTMENT (OUTPATIENT)
Dept: CT IMAGING | Facility: HOSPITAL | Age: 84
End: 2024-02-23
Attending: EMERGENCY MEDICINE
Payer: MEDICARE

## 2024-02-23 ENCOUNTER — NURSE TRIAGE (OUTPATIENT)
Dept: INTERNAL MEDICINE CLINIC | Facility: CLINIC | Age: 84
End: 2024-02-23

## 2024-02-23 ENCOUNTER — HOSPITAL ENCOUNTER (INPATIENT)
Facility: HOSPITAL | Age: 84
LOS: 18 days | Discharge: HOME | End: 2024-03-12
Attending: EMERGENCY MEDICINE | Admitting: INTERNAL MEDICINE
Payer: MEDICARE

## 2024-02-23 VITALS
RESPIRATION RATE: 16 BRPM | SYSTOLIC BLOOD PRESSURE: 134 MMHG | WEIGHT: 137 LBS | TEMPERATURE: 98 F | BODY MASS INDEX: 26 KG/M2 | DIASTOLIC BLOOD PRESSURE: 56 MMHG | OXYGEN SATURATION: 94 % | HEART RATE: 99 BPM

## 2024-02-23 VITALS
OXYGEN SATURATION: 96 % | RESPIRATION RATE: 24 BRPM | HEART RATE: 89 BPM | DIASTOLIC BLOOD PRESSURE: 53 MMHG | TEMPERATURE: 98 F | SYSTOLIC BLOOD PRESSURE: 118 MMHG

## 2024-02-23 DIAGNOSIS — D50.0 IRON DEFICIENCY ANEMIA DUE TO CHRONIC BLOOD LOSS: Primary | ICD-10-CM

## 2024-02-23 DIAGNOSIS — E78.5 DYSLIPIDEMIA: ICD-10-CM

## 2024-02-23 DIAGNOSIS — I26.99 OTHER PULMONARY EMBOLISM WITHOUT ACUTE COR PULMONALE, UNSPECIFIED CHRONICITY (HCC): Primary | ICD-10-CM

## 2024-02-23 DIAGNOSIS — E87.5 HYPERKALEMIA: ICD-10-CM

## 2024-02-23 DIAGNOSIS — R06.00 DYSPNEA, UNSPECIFIED TYPE: Primary | ICD-10-CM

## 2024-02-23 DIAGNOSIS — D62 ABLA (ACUTE BLOOD LOSS ANEMIA): ICD-10-CM

## 2024-02-23 DIAGNOSIS — E87.6 HYPOKALEMIA: ICD-10-CM

## 2024-02-23 DIAGNOSIS — E11.9 TYPE 2 DIABETES MELLITUS WITHOUT RETINOPATHY (HCC): ICD-10-CM

## 2024-02-23 DIAGNOSIS — K90.9 MALABSORPTION OF IRON (HCC): ICD-10-CM

## 2024-02-23 LAB
ALBUMIN SERPL-MCNC: 3.8 G/DL (ref 3.2–4.8)
ALBUMIN/GLOB SERPL: 1.1 {RATIO} (ref 1–2)
ALP LIVER SERPL-CCNC: 73 U/L
ALT SERPL-CCNC: 8 U/L
ANION GAP SERPL CALC-SCNC: 10 MMOL/L (ref 0–18)
APTT PPP: 39.3 SECONDS (ref 23.3–35.6)
AST SERPL-CCNC: 18 U/L (ref ?–34)
ATRIAL RATE: 82 BPM
ATRIAL RATE: 82 BPM
BASOPHILS # BLD AUTO: 0.05 X10(3) UL (ref 0–0.2)
BASOPHILS NFR BLD AUTO: 0.3 %
BILIRUB SERPL-MCNC: 0.2 MG/DL (ref 0.2–1.1)
BNP SERPL-MCNC: 38 PG/ML
BUN BLD-MCNC: 30 MG/DL (ref 9–23)
BUN/CREAT SERPL: 22.4 (ref 10–20)
CALCIUM BLD-MCNC: 8.7 MG/DL (ref 8.7–10.4)
CHLORIDE SERPL-SCNC: 106 MMOL/L (ref 98–112)
CO2 SERPL-SCNC: 21 MMOL/L (ref 21–32)
CREAT BLD-MCNC: 1.34 MG/DL
D DIMER PPP FEU-MCNC: 1.6 UG/ML FEU (ref ?–0.85)
DEPRECATED RDW RBC AUTO: 53.2 FL (ref 35.1–46.3)
EGFRCR SERPLBLD CKD-EPI 2021: 39 ML/MIN/1.73M2 (ref 60–?)
EOSINOPHIL # BLD AUTO: 0.02 X10(3) UL (ref 0–0.7)
EOSINOPHIL NFR BLD AUTO: 0.1 %
ERYTHROCYTE [DISTWIDTH] IN BLOOD BY AUTOMATED COUNT: 16.8 % (ref 11–15)
GLOBULIN PLAS-MCNC: 3.6 G/DL (ref 2.8–4.4)
GLUCOSE BLD-MCNC: 184 MG/DL (ref 70–99)
GLUCOSE BLDC GLUCOMTR-MCNC: 103 MG/DL (ref 70–99)
HCT VFR BLD AUTO: 24.7 %
HGB BLD-MCNC: 8 G/DL
IMM GRANULOCYTES # BLD AUTO: 0.19 X10(3) UL (ref 0–1)
IMM GRANULOCYTES NFR BLD: 1 %
LYMPHOCYTES # BLD AUTO: 1.58 X10(3) UL (ref 1–4)
LYMPHOCYTES NFR BLD AUTO: 8.3 %
MCH RBC QN AUTO: 28.8 PG (ref 26–34)
MCHC RBC AUTO-ENTMCNC: 32.4 G/DL (ref 31–37)
MCV RBC AUTO: 88.8 FL
MONOCYTES # BLD AUTO: 0.65 X10(3) UL (ref 0.1–1)
MONOCYTES NFR BLD AUTO: 3.4 %
NEUTROPHILS # BLD AUTO: 16.51 X10 (3) UL (ref 1.5–7.7)
NEUTROPHILS # BLD AUTO: 16.51 X10(3) UL (ref 1.5–7.7)
NEUTROPHILS NFR BLD AUTO: 86.9 %
OSMOLALITY SERPL CALC.SUM OF ELEC: 295 MOSM/KG (ref 275–295)
P AXIS: 0 DEGREES
P AXIS: 5 DEGREES
P-R INTERVAL: 132 MS
P-R INTERVAL: 140 MS
PLATELET # BLD AUTO: 350 10(3)UL (ref 150–450)
POTASSIUM SERPL-SCNC: 4.6 MMOL/L (ref 3.5–5.1)
PROT SERPL-MCNC: 7.4 G/DL (ref 5.7–8.2)
Q-T INTERVAL: 370 MS
Q-T INTERVAL: 372 MS
QRS DURATION: 66 MS
QRS DURATION: 68 MS
QTC CALCULATION (BEZET): 432 MS
QTC CALCULATION (BEZET): 434 MS
R AXIS: -7 DEGREES
R AXIS: -7 DEGREES
RBC # BLD AUTO: 2.78 X10(6)UL
SODIUM SERPL-SCNC: 137 MMOL/L (ref 136–145)
T AXIS: 29 DEGREES
T AXIS: 47 DEGREES
TROPONIN I SERPL HS-MCNC: 9 NG/L
VENTRICULAR RATE: 82 BPM
VENTRICULAR RATE: 82 BPM
WBC # BLD AUTO: 19 X10(3) UL (ref 4–11)

## 2024-02-23 PROCEDURE — 99223 1ST HOSP IP/OBS HIGH 75: CPT | Performed by: INTERNAL MEDICINE

## 2024-02-23 PROCEDURE — 71260 CT THORAX DX C+: CPT | Performed by: EMERGENCY MEDICINE

## 2024-02-23 PROCEDURE — 99214 OFFICE O/P EST MOD 30 MIN: CPT | Performed by: NURSE PRACTITIONER

## 2024-02-23 PROCEDURE — 71045 X-RAY EXAM CHEST 1 VIEW: CPT

## 2024-02-23 PROCEDURE — 93000 ELECTROCARDIOGRAM COMPLETE: CPT | Performed by: NURSE PRACTITIONER

## 2024-02-23 PROCEDURE — 96374 THER/PROPH/DIAG INJ IV PUSH: CPT

## 2024-02-23 PROCEDURE — 93970 EXTREMITY STUDY: CPT | Performed by: INTERNAL MEDICINE

## 2024-02-23 RX ORDER — ECHINACEA PURPUREA EXTRACT 125 MG
1 TABLET ORAL
Status: DISCONTINUED | OUTPATIENT
Start: 2024-02-23 | End: 2024-03-13

## 2024-02-23 RX ORDER — METOCLOPRAMIDE HYDROCHLORIDE 5 MG/ML
5 INJECTION INTRAMUSCULAR; INTRAVENOUS EVERY 8 HOURS PRN
Status: DISCONTINUED | OUTPATIENT
Start: 2024-02-23 | End: 2024-03-13

## 2024-02-23 RX ORDER — HEPARIN SODIUM AND DEXTROSE 10000; 5 [USP'U]/100ML; G/100ML
INJECTION INTRAVENOUS CONTINUOUS
Status: DISCONTINUED | OUTPATIENT
Start: 2024-02-23 | End: 2024-02-25

## 2024-02-23 RX ORDER — MELATONIN
3 NIGHTLY PRN
Status: DISCONTINUED | OUTPATIENT
Start: 2024-02-23 | End: 2024-03-13

## 2024-02-23 RX ORDER — ROSUVASTATIN CALCIUM 10 MG/1
10 TABLET, COATED ORAL NIGHTLY
Status: DISCONTINUED | OUTPATIENT
Start: 2024-02-23 | End: 2024-03-13

## 2024-02-23 RX ORDER — HEPARIN SODIUM AND DEXTROSE 10000; 5 [USP'U]/100ML; G/100ML
18 INJECTION INTRAVENOUS ONCE
Status: COMPLETED | OUTPATIENT
Start: 2024-02-23 | End: 2024-02-23

## 2024-02-23 RX ORDER — ASPIRIN 325 MG
325 TABLET, DELAYED RELEASE (ENTERIC COATED) ORAL DAILY
Status: DISCONTINUED | OUTPATIENT
Start: 2024-02-24 | End: 2024-02-24

## 2024-02-23 RX ORDER — BISACODYL 10 MG
10 SUPPOSITORY, RECTAL RECTAL
Status: DISCONTINUED | OUTPATIENT
Start: 2024-02-23 | End: 2024-03-13

## 2024-02-23 RX ORDER — ALBUTEROL SULFATE 90 UG/1
2 AEROSOL, METERED RESPIRATORY (INHALATION) EVERY 4 HOURS PRN
Status: DISCONTINUED | OUTPATIENT
Start: 2024-02-23 | End: 2024-03-13

## 2024-02-23 RX ORDER — NIFEDIPINE 60 MG/1
60 TABLET, EXTENDED RELEASE ORAL DAILY
Status: DISCONTINUED | OUTPATIENT
Start: 2024-02-24 | End: 2024-03-05

## 2024-02-23 RX ORDER — BENZONATATE 200 MG/1
200 CAPSULE ORAL 3 TIMES DAILY PRN
Status: DISCONTINUED | OUTPATIENT
Start: 2024-02-23 | End: 2024-03-13

## 2024-02-23 RX ORDER — TORSEMIDE 20 MG/1
20 TABLET ORAL DAILY
Status: DISCONTINUED | OUTPATIENT
Start: 2024-02-24 | End: 2024-03-13

## 2024-02-23 RX ORDER — ONDANSETRON 2 MG/ML
4 INJECTION INTRAMUSCULAR; INTRAVENOUS EVERY 6 HOURS PRN
Status: DISCONTINUED | OUTPATIENT
Start: 2024-02-23 | End: 2024-03-13

## 2024-02-23 RX ORDER — FLUTICASONE FUROATE AND VILANTEROL 100; 25 UG/1; UG/1
1 POWDER RESPIRATORY (INHALATION) DAILY
Status: DISCONTINUED | OUTPATIENT
Start: 2024-02-23 | End: 2024-03-13

## 2024-02-23 RX ORDER — HEPARIN SODIUM 1000 [USP'U]/ML
80 INJECTION, SOLUTION INTRAVENOUS; SUBCUTANEOUS ONCE
Status: COMPLETED | OUTPATIENT
Start: 2024-02-23 | End: 2024-02-23

## 2024-02-23 RX ORDER — SPIRONOLACTONE 50 MG/1
50 TABLET, FILM COATED ORAL DAILY
Status: DISCONTINUED | OUTPATIENT
Start: 2024-02-24 | End: 2024-02-29

## 2024-02-23 RX ORDER — CARVEDILOL 25 MG/1
25 TABLET ORAL 2 TIMES DAILY WITH MEALS
Status: DISCONTINUED | OUTPATIENT
Start: 2024-02-23 | End: 2024-03-08

## 2024-02-23 RX ORDER — HYDRALAZINE HYDROCHLORIDE 20 MG/ML
10 INJECTION INTRAMUSCULAR; INTRAVENOUS ONCE
Status: COMPLETED | OUTPATIENT
Start: 2024-02-23 | End: 2024-02-23

## 2024-02-23 RX ORDER — PREDNISONE 5 MG/1
5 TABLET ORAL DAILY
Status: DISCONTINUED | OUTPATIENT
Start: 2024-02-24 | End: 2024-03-13

## 2024-02-23 RX ORDER — LOSARTAN POTASSIUM 50 MG/1
50 TABLET ORAL DAILY
Status: DISCONTINUED | OUTPATIENT
Start: 2024-02-24 | End: 2024-03-05

## 2024-02-23 RX ORDER — IPRATROPIUM BROMIDE AND ALBUTEROL SULFATE 2.5; .5 MG/3ML; MG/3ML
3 SOLUTION RESPIRATORY (INHALATION) ONCE
Status: COMPLETED | OUTPATIENT
Start: 2024-02-23 | End: 2024-02-23

## 2024-02-23 RX ORDER — POTASSIUM CHLORIDE 20 MEQ/1
20 TABLET, EXTENDED RELEASE ORAL 2 TIMES DAILY
Status: DISCONTINUED | OUTPATIENT
Start: 2024-02-23 | End: 2024-03-05

## 2024-02-23 RX ORDER — PANTOPRAZOLE SODIUM 20 MG/1
20 TABLET, DELAYED RELEASE ORAL
Status: DISCONTINUED | OUTPATIENT
Start: 2024-02-24 | End: 2024-02-28

## 2024-02-23 RX ORDER — HYDROXYCHLOROQUINE SULFATE 200 MG/1
200 TABLET, FILM COATED ORAL DAILY
Status: DISCONTINUED | OUTPATIENT
Start: 2024-02-23 | End: 2024-03-13

## 2024-02-23 RX ORDER — CETIRIZINE HYDROCHLORIDE 5 MG/1
5 TABLET ORAL DAILY
Status: DISCONTINUED | OUTPATIENT
Start: 2024-02-24 | End: 2024-03-13

## 2024-02-23 RX ORDER — SODIUM CHLORIDE 9 MG/ML
10 INJECTION, SOLUTION INTRAMUSCULAR; INTRAVENOUS; SUBCUTANEOUS ONCE
OUTPATIENT
Start: 2024-02-23

## 2024-02-23 RX ORDER — HEPARIN SODIUM (PORCINE) LOCK FLUSH IV SOLN 100 UNIT/ML 100 UNIT/ML
5 SOLUTION INTRAVENOUS ONCE
OUTPATIENT
Start: 2024-02-23

## 2024-02-23 RX ORDER — ACETAMINOPHEN 500 MG
500 TABLET ORAL EVERY 6 HOURS PRN
Status: DISCONTINUED | OUTPATIENT
Start: 2024-02-23 | End: 2024-03-13

## 2024-02-23 RX ORDER — POLYETHYLENE GLYCOL 3350 17 G/17G
17 POWDER, FOR SOLUTION ORAL DAILY PRN
Status: DISCONTINUED | OUTPATIENT
Start: 2024-02-23 | End: 2024-03-11

## 2024-02-23 RX ORDER — GLYCOPYRROLATE 1 MG/1
1 TABLET ORAL 3 TIMES DAILY
Status: DISCONTINUED | OUTPATIENT
Start: 2024-02-23 | End: 2024-03-13

## 2024-02-23 RX ORDER — LORATADINE 10 MG/1
TABLET ORAL
Qty: 90 TABLET | Refills: 1 | Status: ON HOLD | OUTPATIENT
Start: 2024-02-23

## 2024-02-23 RX ORDER — MONTELUKAST SODIUM 10 MG/1
10 TABLET ORAL NIGHTLY
Status: DISCONTINUED | OUTPATIENT
Start: 2024-02-23 | End: 2024-03-13

## 2024-02-23 RX ORDER — SENNOSIDES 8.6 MG
17.2 TABLET ORAL NIGHTLY PRN
Status: DISCONTINUED | OUTPATIENT
Start: 2024-02-23 | End: 2024-03-13

## 2024-02-23 NOTE — ED QUICK NOTES
Orders for admission, patient is aware of plan and ready to go upstairs. Any questions, please call ED RN alexandra at extension [phone].     Patient Covid vaccination status: Fully vaccinated     COVID Test Ordered in ED: None    COVID Suspicion at Admission: N/A    Running Infusions:    continuous dose heparin      None    Mental Status/LOC at time of transport: x4    Other pertinent information:   CIWA score: N/A   NIH score:  N/A

## 2024-02-23 NOTE — ED PROVIDER NOTES
Patient Seen in: [facility] Emergency Department      History     Chief Complaint   Patient presents with    Difficulty Breathing     Stated Complaint: Sob    Subjective:   HPI    Patient is an 84-year-old female with a history of COPD hypertension high blood pressure who was hospitalized about 2 weeks ago for shortness of breath.  Chest x-ray showed vague opacities consistent with multifocal pneumonia versus heart failure cardiology was consulted consulted she was discharged shortly after admission on antibiotics.  Since that time she continues to have shortness of breath with any exertion.  She been undergoing therapy and whenever she exerts herself her O2 sats go into the 80s.  Today she went for an iron infusion and walking back to the car her oxygen levels went into the low 80s.  She feels quite short of breath during these times.  Gets better when she rests.  No chest pain.    Objective:   Past Medical History:   Diagnosis Date    Anxiety state     Cancer (HCC)     breast cancer 2018    Chronic obstructive pulmonary disease, unspecified (HCC) 05/04/2023    Chronic obstructive pulmonary disease, unspecified (HCC) 05/04/2023    COPD (chronic obstructive pulmonary disease) (HCC)     Diabetes (HCC)     Essential hypertension     Exposure to medical diagnostic radiation     High blood pressure     High cholesterol     History of blood transfusion 07/2022    low hemoglobin    Osteoarthritis     PONV (postoperative nausea and vomiting)     Pulmonary emphysema (HCC)     Rheumatoid arthritis (HCC)               Past Surgical History:   Procedure Laterality Date    CATARACT EXTRACTION W/  INTRAOCULAR LENS IMPLANT Bilateral 2017     in [facility]     COLONOSCOPY      COLONOSCOPY N/A 07/21/2022    Procedure: COLONOSCOPY;  Surgeon: Mikey Garcia MD;  Location: [facility] ENDOSCOPY    COLONOSCOPY N/A 06/15/2023    Procedure: COLONOSCOPY;  Surgeon: Mikey Garcia MD;  Location: [facility] ENDOSCOPY    CORRECT  BUNION,SIMPLE      right foot      EGD  2023    Dr. Garcia; Duodenal AVM's x4 cauterized    HYSTERECTOMY      1972, no abnormal Paps, due to heavy bleeding with fibroids.  Not sure if it had bilateral salpingo-oophorectomy.    IR ANEURYSM REPAIRM      Computer assisted volumetric craniofomy with clipping of anterior cerebral artery.    LUMPECTOMY RIGHT      RADIATION RIGHT      ROTATOR CUFF REPAIR      1993    TOTAL KNEE REPLACEMENT Right 2010    TOTAL KNEE REPLACEMENT Left 2015    TRANSORAL INCISIONLESS FUNDOPLICATION - INTERNAL  2012 Fredy fundoplication at [facility] Dr. Fournier.    Fredy fundoplication at [facility] Dr. Fournier.    TRIGGER FINGER RELEASE      left hand      YAG CAPSULOTOMY - OU - BOTH EYES Bilateral 2021    done in [place]                Social History     Socioeconomic History    Marital status:    Tobacco Use    Smoking status: Former     Packs/day: .25     Types: Cigarettes     Quit date:      Years since quittin.1    Smokeless tobacco: Never    Tobacco comments:     [id number]   Vaping Use    Vaping Use: Never used   Substance and Sexual Activity    Alcohol use: Never    Drug use: Never   Social History Narrative    Just moved in from [place].   passed away and that is why moved from [place] to be with daughter who lives in [place].  Currently lives with daughter.     Social Determinants of Health     Financial Resource Strain: Low Risk  (2024)    Financial Resource Strain     Difficulty of Paying Living Expenses: Not hard at all     Med Affordability: No   Food Insecurity: No Food Insecurity (2024)    Food Insecurity     Food Insecurity: Never true   Transportation Needs: No Transportation Needs (2024)    Transportation Needs     Lack of Transportation: No   Housing Stability: Low Risk  (2024)    Housing Stability     Housing Instability: No              Review of Systems    Positive  for stated complaint: Sob  Other systems are as noted in HPI.  Constitutional and vital signs reviewed.      All other systems reviewed and negative except as noted above.    Physical Exam     ED Triage Vitals [02/23/24 1228]   /52   Pulse 86   Resp 18   Temp 98.6 °F (37 °C)   Temp src Temporal   SpO2 (!) 85 %   O2 Device None (Room air)       Current:/59   Pulse 90   Temp 98.6 °F (37 °C) (Temporal)   Resp 19   SpO2 94%         Physical Exam    Constitutional: Oriented to person, place, and time. Appears well-developed and well-nourished.   HEENT:   Head: Normocephalic and atraumatic.   Right Ear: External ear normal.   Left Ear: External ear normal.   Nose: Nose normal.   Mouth/Throat: Oropharynx is clear and moist.   Eyes: Conjunctivae and EOM are normal. Pupils are equal, round, and reactive to light.   Neck: Neck supple.   Cardiovascular: Normal rate, regular rhythm, normal heart sounds and intact distal pulses.    Pulmonary/Chest: Effort normal and breath sounds normal. No respiratory distress.   Abdominal: Soft. Bowel sounds are normal. Exhibits no distension and no mass. There is no tenderness. There is no rebound and no guarding.   Musculoskeletal: Normal range of motion. Exhibits no edema or tenderness.   Lymphadenopathy: No cervical adenopathy.   Neurological: Alert and oriented to person, place, and time. Normal reflexes. No cranial nerve deficit. No motor os sensory defecits noted Coordination normal.   Skin: Skin is warm and dry.   Psychiatric: Normal mood and affect. Behavior is normal. Judgment and thought content normal.   Nursing note and vitals reviewed.        ED Course     Labs Reviewed   COMP METABOLIC PANEL (14) - Abnormal; Notable for the following components:       Result Value    Glucose 184 (*)     BUN 30 (*)     Creatinine 1.34 (*)     BUN/CREA Ratio 22.4 (*)     eGFR-Cr 39 (*)     ALT 8 (*)     All other components within normal limits   D-DIMER - Abnormal; Notable for the  following components:    D-Dimer 1.60 (*)     All other components within normal limits   CBC W/ DIFFERENTIAL - Abnormal; Notable for the following components:    WBC 19.0 (*)     RBC 2.78 (*)     HGB 8.0 (*)     HCT 24.7 (*)     RDW-SD 53.2 (*)     RDW 16.8 (*)     Neutrophil Absolute Prelim 16.51 (*)     Neutrophil Absolute 16.51 (*)     All other components within normal limits   TROPONIN I HIGH SENSITIVITY - Normal   BNP (B TYPE NATRIURETIC PEPTIDE) - Normal   CBC WITH DIFFERENTIAL WITH PLATELET    Narrative:     The following orders were created for panel order CBC With Differential With Platelet.  Procedure                               Abnormality         Status                     ---------                               -----------         ------                     CBC W/ DIFFERENTIAL[[id number]]          Abnormal            Final result                 Please view results for these tests on the individual orders.   PTT, ACTIVATED   RAINBOW DRAW BLUE     EKG    Rate, intervals and axes as noted on EKG Report.  Rate: 82  Rhythm: Sinus Rhythm  Reading: Sinus rhythm nonspecific T changes                          MDM      Use of independent historian:     I personally reviewed and interpreted the images :     CT CHEST PE AORTA (IV ONLY) (CPT=71260)    Result Date: 2/23/2024  CONCLUSION:   Acute pulmonary embolus extending from the right lower lobar pulmonary artery to to multiple segmental arteries.  Overall low clot burden.  No CT evidence of right heart strain.  Bilateral upper lobe fibrosis and cystic changes.  Suspected superimposed infection in the left upper lobe.  These findings were communicated with Dr. Alex Álvarez by Dr. Awais Munson at 1719 hours using telephone on 02/23/2024.      elm-remote   Dictated by (CST): Awais Munson MD on 2/23/2024 at 5:07 PM     Finalized by (CST): wAais Munson MD on 2/23/2024 at 5:19 PM          XR CHEST AP PORTABLE  (CPT=71045)    Result Date: 2/23/2024  PROCEDURE: XR CHEST  AP PORTABLE  (CPT=71045) TIME: 1331  COMPARISON: [facility], XR CHEST AP PORTABLE (CPT=71045), 2/03/2024, 2:28 PM.  INDICATIONS: Shortness of breath for one week.  TECHNIQUE:   Single view.   Findings and impression:  Normal heart size.  No edema  The distal 1.5 centimeter portion of the renea catheter remains malposition in the azygos vein   Normal lung volumes  Decreased mild ill-defined bilateral opacities involving the mid to lower lungs  No new pulmonary opacity  No pneumothorax or pleural effusion   Dictated by (CST): Alec Davis MD on 2/23/2024 at 1:46 PM     Finalized by (CST): Alec Davis MD on 2/23/2024 at 1:47 PM           Vitals:    02/23/24 1455 02/23/24 1600 02/23/24 1630 02/23/24 1730   BP:  120/58 119/60 125/59   Pulse:  88 86 90   Resp:  23 20 19   Temp:       TempSrc:       SpO2: 98% 93% 94% 94%     *I personally reviewed and interpreted all ED vitals.    Pulse Ox: 96%, Room air, Normal     EKG interpretation above independently interpreted by me    Monitor Interpretation:   normal sinus rhythm independently interpreted by me    Differential Diagnosis/ Diagnostic Considerations: Exertional dyspnea and hypoxia consider heart failure consider pneumonia consider PE consider COPD exacerbation    Medical Record Review: I personally reviewed available prior medical records for any recent pertinent discharge summaries, testing, and procedures and reviewed those reports and found admission from 2/5/2024 H&P and ER visit notes reviewed.  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  She has chronic iron deficiency    Complicating Factors: The patient already has history of COPD which contribute to the complexity of this ED evaluation.    Social determinants of health:    Prescription drug management:      Shared Decision Making:    ED Course: D-dimer is elevated can we  will check CT for PE    Discussion of management with other healthcare providers: I spoke with  will admit pulmonary consulted will start heparin drip.    Condition upon leaving the department: Stable    Admission disposition: 2/23/2024  5:40 PM             I spent a total of 45 minutes of critical care time in obtaining history, performing a physical exam, bedside monitoring of interventions, collecting and interpreting tests and discussion with consultants but not including time spent performing procedures.                             Medical Decision Making      Disposition and Plan     Clinical Impression:  1. Other pulmonary embolism without acute cor pulmonale, unspecified chronicity (HCC)         Disposition:  Admit  2/23/2024  5:40 pm    Follow-up:  No follow-up provider specified.  We recommend that you schedule follow up care with a primary care provider within the next three months to obtain basic health screening including reassessment of your blood pressure.      Medications Prescribed:  Current Discharge Medication List        START taking these medications    Details   LORATADINE 10 MG Oral Tab TAKE 1 TABLET BY MOUTH EVERY DAY  Qty: 90 tablet, Refills: 1                               Hospital Problems       Present on Admission  Date Reviewed: 2/18/2024            ICD-10-CM Noted POA    * (Principal) Other pulmonary embolism without acute cor pulmonale, unspecified chronicity (HCC) I26.99 2/23/2024 Unknown

## 2024-02-23 NOTE — ED INITIAL ASSESSMENT (HPI)
Pt daughter states last week has been having SOB. Pt daughter states has been doing PT and during PT O2 has dropped in the 80s.

## 2024-02-23 NOTE — ED PROVIDER NOTES
Patient Seen in: [facility]    History   CC: sob  HPI: Chester Bautista 84 year old female w/ COPD, HTN, Hyperlipidemia, DMII, Breat CA 2018, Anemia currently receiving Fe infusions and followed by Dr. Mohamud with last infusion given this morning who presents to IC c/o increased dyspnea above her baseline.  States any exertion causes her SpO2 saturations to dip into the upper 80s/low 90s.  Denies chest pain associated.  Denies dyspnea at rest.  Was inpatient for hypokalemia and pneumonia versus pulmonary edema chest x-ray 2 weeks ago.  Has had ongoing right-sided lower extremity swelling for which she is scheduled for arteriogram however needs stress test prior, scheduled for 2/27/2024. +dizziness associated with sob. Denies fever, rash, gi s/s.     Past Medical History:   Diagnosis Date    Anxiety state     Cancer (HCC)     breast cancer 2018    Chronic obstructive pulmonary disease, unspecified (HCC) 05/04/2023    Chronic obstructive pulmonary disease, unspecified (HCC) 05/04/2023    COPD (chronic obstructive pulmonary disease) (HCC)     Diabetes (HCC)     Essential hypertension     Exposure to medical diagnostic radiation     High blood pressure     High cholesterol     History of blood transfusion 07/2022    low hemoglobin    Osteoarthritis     PONV (postoperative nausea and vomiting)     Pulmonary emphysema (HCC)     Rheumatoid arthritis (HCC)        Past Surgical History:   Procedure Laterality Date    CATARACT EXTRACTION W/  INTRAOCULAR LENS IMPLANT Bilateral 2017    Dr in [facility]     COLONOSCOPY      COLONOSCOPY N/A 07/21/2022    Procedure: COLONOSCOPY;  Surgeon: Mikey Garcia MD;  Location: [facility] ENDOSCOPY    COLONOSCOPY N/A 06/15/2023    Procedure: COLONOSCOPY;  Surgeon: Mikey Garcia MD;  Location: [facility] ENDOSCOPY    CORRECT BUNION,SIMPLE      right foot  1993    EGD  12/21/2023    Dr. Garcia; Duodenal AVM's x4 cauterized    HYSTERECTOMY      1972, no abnormal Paps, due to  heavy bleeding with fibroids.  Not sure if it had bilateral salpingo-oophorectomy.    IR ANEURYSM REPAIRM      Computer assisted volumetric craniofomy with clipping of anterior cerebral artery.    LUMPECTOMY RIGHT      RADIATION RIGHT      ROTATOR CUFF REPAIR          TOTAL KNEE REPLACEMENT Right 2010    TOTAL KNEE REPLACEMENT Left 2015    TRANSORAL INCISIONLESS FUNDOPLICATION - INTERNAL  2012 Fredy fundoplication at [facility] Dr. Fournier.    Fredy fundoplication at [facility] Dr. Fournier.    TRIGGER FINGER RELEASE      left hand      YAG CAPSULOTOMY - OU - BOTH EYES Bilateral 2021    done in [place]       Family History   Problem Relation Age of Onset    Heart Surgery Mother         Leaky valve at 39 y/o.     Prostate Cancer Brother     Cancer Brother     Diabetes Maternal Grandmother     Diabetes Paternal Aunt     Breast Cancer Self 78    Breast Cancer Niece         before 50    Macular degeneration Neg     Glaucoma Neg        Social History     Socioeconomic History    Marital status:    Tobacco Use    Smoking status: Former     Packs/day: .25     Types: Cigarettes     Quit date:      Years since quittin.1    Smokeless tobacco: Never    Tobacco comments:     [id number]   Vaping Use    Vaping Use: Never used   Substance and Sexual Activity    Alcohol use: Never    Drug use: Never   Social History Narrative    Just moved in from [place].   passed away and that is why moved from [place] to be with daughter who lives in [place].  Currently lives with daughter.     Social Determinants of Health     Financial Resource Strain: Low Risk  (2024)    Financial Resource Strain     Difficulty of Paying Living Expenses: Not hard at all     Med Affordability: No   Food Insecurity: No Food Insecurity (2024)    Food Insecurity     Food Insecurity: Never true   Transportation Needs: No Transportation Needs (2024)    Transportation  Needs     Lack of Transportation: No   Housing Stability: Low Risk  (2/2/2024)    Housing Stability     Housing Instability: No       ROS:  Review of Systems    Positive for stated complaint: SOB  Other systems are as noted in HPI.  Constitutional and vital signs reviewed.      All other systems reviewed and negative except as noted above.    PSFH elements reviewed from today and agreed except as otherwise stated in HPI.             Constitutional and vital signs reviewed.        Physical Exam     ED Triage Vitals [02/23/24 1127]   /53   Pulse 89   Resp 24   Temp 97.7 °F (36.5 °C)   Temp src Oral   SpO2 96 %   O2 Device None (Room air)       Current:/53   Pulse 89   Temp 97.7 °F (36.5 °C) (Oral)   Resp 24   SpO2 96%         PE:  General - Appears well, non-toxic and in NAD  Head - Appears symmetrical without deformity/swelling cranium, scalp, or facial bones  Eyes - sclera not injected, no discharge noted, no periorbital edema  ENT - EAC bilaterally without discharge   Oropharynx clear, voice is clear.   Neck - no significant adenopathy, supple with trachea midline  Resp -lungs diminished however at rest no increased work of breathing.  Speaking in full sentences.  Skin - no rashes or petechiae noted, pink warm and dry throughout, mmm, cap refill <2seconds  Neuro - A&O x4, sensation equal to both medial and lateral aspects of extremities, steady gait  MSK - makes purposeful movements of all extremities, radial pulses 2+ bilat.  Psych - Interactive and appropriate      ED Course   Labs Reviewed - No data to display  EKG    Rate, intervals and axes as noted on EKG Report.  Rate: 82  Rhythm: Sinus Rhythm  Reading: NSR  No sig change when compared to EKG performed 2/2/2024           MDM     Discussed with patient and daughter/caregiver who accompanies her need for further evaluation/management in the emergency room at this time.  Patient with recent hospital immunization 2 weeks ago for pneumonia versus  pulmonary edema on x-ray and hypokalemia.  Followed by Dr. Mohamud for anemia with last iron infusion this morning.  Has had dyspnea however for the last 1 week, primarily exertional in nature.  Denies dyspnea at rest.  SpO2 96% at rest on room air.  Reports however she will dip into the high 80s/low 90s with exertion.  Denies chest pain associated however will get dizzy.  Discussed need for diagnostics unavailable at this facility which patient and daughter are agreeable to at this time.  They will go to [place] emergency room via private car.  Declined EMS.  They will go directly there.  N.p.o. advised until otherwise instructed by ED provider.  They both demonstrate understanding of instruction and agree with plan of care.  This case was also discussed with Dr. Iyer who agrees with plan of care.      Disposition and Plan     Clinical Impression:  1. Dyspnea, unspecified type        Disposition:  Ic to ed    Follow-up:  No follow-up provider specified.    Medications Prescribed:  Current Discharge Medication List

## 2024-02-23 NOTE — TELEPHONE ENCOUNTER
Action Requested: Summary for Provider     []  Critical Lab, Recommendations Needed  [] Need Additional Advice  []   FYI    []   Need Orders  [] Need Medications Sent to Pharmacy  []  Other     SUMMARY: Patient's daughter reports patient is having more than usual SOB. States patient is on oxygen, and every time she walks it drops to 94%. Patient is having to stop more often to catch her breath.   Daughter states patient has lost 6lbs since she last saw provider on 2/16.  Daughter advised to take patient to [facility] for an evaluation. Daughter verbalized understanding and stated she will go to the one in [place].   Nothing further at this time.       Reason for call: Shortness Of Breath  Onset: 2/23          Reason for Disposition   MILD difficulty breathing (e.g., minimal/no SOB at rest, SOB with walking, pulse < 100) of new-onset or worse than normal    Protocols used: Breathing Difficulty-A-OH

## 2024-02-23 NOTE — ED INITIAL ASSESSMENT (HPI)
Patient arrives ambulatory through triage accompanied by daughter for shortness of breath for the last 2 weeks. Per patient has home PT and noted oxygen saturations dropped, but today when for iron infusion and was advice to go to IC due to low oxygen sats.    85% on RA upon arrival.

## 2024-02-23 NOTE — PROGRESS NOTES
Pt here for venofer 200 mg  1/3       Pt  reports fatigue and SOB , can tell she is due for Iron    She has also had  significant unexplained weight loss over the past week  She is following up with her MD regarding this change      Ordering MD: Cade Woodard Exp: 3 of 3     Pt tolerated infusion without difficulty or complaint. Reviewed next apt date/time:      Education Record  Learner:  Patient  Disease / Diagnosis: anemia  Barriers / Limitations:  None  Method:  Discussion  General Topics:  Side effects and symptom management  Outcome:  Observed demonstration

## 2024-02-24 PROBLEM — I26.99 ACUTE PULMONARY EMBOLISM WITHOUT ACUTE COR PULMONALE (HCC): Status: ACTIVE | Noted: 2024-02-23

## 2024-02-24 LAB
ALBUMIN SERPL-MCNC: 3.4 G/DL (ref 3.2–4.8)
ALBUMIN/GLOB SERPL: 1.1 {RATIO} (ref 1–2)
ALP LIVER SERPL-CCNC: 63 U/L
ALT SERPL-CCNC: <7 U/L
ANION GAP SERPL CALC-SCNC: 5 MMOL/L (ref 0–18)
APTT PPP: 195.1 SECONDS (ref 23.3–35.6)
APTT PPP: 99.4 SECONDS (ref 23.3–35.6)
APTT PPP: >240 SECONDS (ref 23.3–35.6)
AST SERPL-CCNC: 18 U/L (ref ?–34)
BASOPHILS # BLD AUTO: 0.05 X10(3) UL (ref 0–0.2)
BASOPHILS NFR BLD AUTO: 0.4 %
BILIRUB SERPL-MCNC: 0.2 MG/DL (ref 0.2–1.1)
BILIRUB UR QL: NEGATIVE
BUN BLD-MCNC: 22 MG/DL (ref 9–23)
BUN/CREAT SERPL: 21.6 (ref 10–20)
CALCIUM BLD-MCNC: 8.5 MG/DL (ref 8.7–10.4)
CHLORIDE SERPL-SCNC: 113 MMOL/L (ref 98–112)
CO2 SERPL-SCNC: 24 MMOL/L (ref 21–32)
COLOR UR: YELLOW
CREAT BLD-MCNC: 1.02 MG/DL
DEPRECATED RDW RBC AUTO: 53.5 FL (ref 35.1–46.3)
EGFRCR SERPLBLD CKD-EPI 2021: 54 ML/MIN/1.73M2 (ref 60–?)
EOSINOPHIL # BLD AUTO: 0.2 X10(3) UL (ref 0–0.7)
EOSINOPHIL NFR BLD AUTO: 1.4 %
ERYTHROCYTE [DISTWIDTH] IN BLOOD BY AUTOMATED COUNT: 17.1 % (ref 11–15)
GLOBULIN PLAS-MCNC: 3 G/DL (ref 2.8–4.4)
GLUCOSE BLD-MCNC: 143 MG/DL (ref 70–99)
GLUCOSE BLDC GLUCOMTR-MCNC: 115 MG/DL (ref 70–99)
GLUCOSE BLDC GLUCOMTR-MCNC: 153 MG/DL (ref 70–99)
GLUCOSE BLDC GLUCOMTR-MCNC: 179 MG/DL (ref 70–99)
GLUCOSE BLDC GLUCOMTR-MCNC: 202 MG/DL (ref 70–99)
GLUCOSE BLDC GLUCOMTR-MCNC: 222 MG/DL (ref 70–99)
GLUCOSE UR-MCNC: NORMAL MG/DL
HCT VFR BLD AUTO: 22.8 %
HGB BLD-MCNC: 7.5 G/DL
IMM GRANULOCYTES # BLD AUTO: 0.1 X10(3) UL (ref 0–1)
IMM GRANULOCYTES NFR BLD: 0.7 %
KETONES UR-MCNC: NEGATIVE MG/DL
LEUKOCYTE ESTERASE UR QL STRIP.AUTO: 500
LYMPHOCYTES # BLD AUTO: 2.16 X10(3) UL (ref 1–4)
LYMPHOCYTES NFR BLD AUTO: 15.2 %
MCH RBC QN AUTO: 29.2 PG (ref 26–34)
MCHC RBC AUTO-ENTMCNC: 32.9 G/DL (ref 31–37)
MCV RBC AUTO: 88.7 FL
MONOCYTES # BLD AUTO: 1.07 X10(3) UL (ref 0.1–1)
MONOCYTES NFR BLD AUTO: 7.5 %
NEUTROPHILS # BLD AUTO: 10.62 X10 (3) UL (ref 1.5–7.7)
NEUTROPHILS # BLD AUTO: 10.62 X10(3) UL (ref 1.5–7.7)
NEUTROPHILS NFR BLD AUTO: 74.8 %
NITRITE UR QL STRIP.AUTO: NEGATIVE
OSMOLALITY SERPL CALC.SUM OF ELEC: 300 MOSM/KG (ref 275–295)
PH UR: 6 [PH] (ref 5–8)
PLATELET # BLD AUTO: 336 10(3)UL (ref 150–450)
POTASSIUM SERPL-SCNC: 3.8 MMOL/L (ref 3.5–5.1)
PROT SERPL-MCNC: 6.4 G/DL (ref 5.7–8.2)
PROT UR-MCNC: 30 MG/DL
RBC # BLD AUTO: 2.57 X10(6)UL
RBC #/AREA URNS AUTO: >10 /HPF
SODIUM SERPL-SCNC: 142 MMOL/L (ref 136–145)
SP GR UR STRIP: 1.01 (ref 1–1.03)
UROBILINOGEN UR STRIP-ACNC: NORMAL
WBC # BLD AUTO: 14.2 X10(3) UL (ref 4–11)
WBC #/AREA URNS AUTO: >50 /HPF
WBC CLUMPS UR QL AUTO: PRESENT /HPF
YEAST UR QL: PRESENT /HPF

## 2024-02-24 PROCEDURE — 99232 SBSQ HOSP IP/OBS MODERATE 35: CPT | Performed by: INTERNAL MEDICINE

## 2024-02-24 PROCEDURE — 99223 1ST HOSP IP/OBS HIGH 75: CPT | Performed by: INTERNAL MEDICINE

## 2024-02-24 RX ORDER — NICOTINE POLACRILEX 4 MG
30 LOZENGE BUCCAL
Status: DISCONTINUED | OUTPATIENT
Start: 2024-02-24 | End: 2024-03-13

## 2024-02-24 RX ORDER — TRAMADOL HYDROCHLORIDE 50 MG/1
50 TABLET ORAL ONCE
Status: COMPLETED | OUTPATIENT
Start: 2024-02-24 | End: 2024-02-24

## 2024-02-24 RX ORDER — DEXTROSE MONOHYDRATE 25 G/50ML
50 INJECTION, SOLUTION INTRAVENOUS
Status: DISCONTINUED | OUTPATIENT
Start: 2024-02-24 | End: 2024-03-13

## 2024-02-24 RX ORDER — CYCLOBENZAPRINE HCL 5 MG
10 TABLET ORAL NIGHTLY
Status: DISCONTINUED | OUTPATIENT
Start: 2024-02-24 | End: 2024-03-13

## 2024-02-24 RX ORDER — POTASSIUM CHLORIDE 20 MEQ/1
40 TABLET, EXTENDED RELEASE ORAL ONCE
Status: COMPLETED | OUTPATIENT
Start: 2024-02-24 | End: 2024-02-24

## 2024-02-24 RX ORDER — TRAMADOL HYDROCHLORIDE 50 MG/1
50 TABLET ORAL EVERY 12 HOURS PRN
Status: DISCONTINUED | OUTPATIENT
Start: 2024-02-24 | End: 2024-03-13

## 2024-02-24 RX ORDER — NICOTINE POLACRILEX 4 MG
15 LOZENGE BUCCAL
Status: DISCONTINUED | OUTPATIENT
Start: 2024-02-24 | End: 2024-03-13

## 2024-02-24 RX ORDER — NITROFURANTOIN 25; 75 MG/1; MG/1
100 CAPSULE ORAL 2 TIMES DAILY
Status: DISCONTINUED | OUTPATIENT
Start: 2024-02-24 | End: 2024-02-27

## 2024-02-24 NOTE — CONSULTS
[facility]    Consult Note    Date:  2/23/2024  Date of Admission:  2/23/2024    Chief Complaint:   Chester Bautista is a(n) 84 year old female with venous thromboembolism.    HPI:   I met Ms. Bautista 2 years ago to evaluate for possible diagnosis of sarcoidosis.  His history of breast cancer, rheumatoid arthritis, status postlumpectomy and radiation, diabetes mellitus.  She also has a history of anemia with bleeding AVMs.  She was hospitalized recently for possible pneumonia.  She notes that she never got much better.  She was being evaluated at the cancer center for iron infusion and was referred to urgent care for hypoxemia after which she was sent to the emergency room.  CT scan of the chest demonstrates pulmonary embolism.  She does admit to right lower extremity swelling and mild cramp.  She quit tobacco in 1997 after an occasional cigarette for years.  There is no personal nor family history of deep venous thrombosis, pleurisy, TB exposure, fever, chills, shakes.    History     Past Medical History:   Diagnosis Date    Anxiety state     Cancer (HCC)     breast cancer 2018    Chronic obstructive pulmonary disease, unspecified (HCC) 05/04/2023    Chronic obstructive pulmonary disease, unspecified (HCC) 05/04/2023    COPD (chronic obstructive pulmonary disease) (HCC)     Diabetes (HCC)     Essential hypertension     Exposure to medical diagnostic radiation     High blood pressure     High cholesterol     History of blood transfusion 07/2022    low hemoglobin    Osteoarthritis     PONV (postoperative nausea and vomiting)     Pulmonary emphysema (HCC)     Rheumatoid arthritis (HCC)      Past Surgical History:   Procedure Laterality Date    CATARACT EXTRACTION W/  INTRAOCULAR LENS IMPLANT Bilateral 2017     in [facility]     COLONOSCOPY      COLONOSCOPY N/A 07/21/2022    Procedure: COLONOSCOPY;  Surgeon: Mikey Garcia MD;  Location: [facility] ENDOSCOPY    COLONOSCOPY N/A 06/15/2023     Procedure: COLONOSCOPY;  Surgeon: Mikey Garcia MD;  Location: [facility] ENDOSCOPY    CORRECT BUNION,SIMPLE      right foot      EGD  2023    Dr. Garcia; Duodenal AVM's x4 cauterized    HYSTERECTOMY      , no abnormal Paps, due to heavy bleeding with fibroids.  Not sure if it had bilateral salpingo-oophorectomy.    IR ANEURYSM REPAIRM      Computer assisted volumetric craniofomy with clipping of anterior cerebral artery.    LUMPECTOMY RIGHT      RADIATION RIGHT      ROTATOR CUFF REPAIR          TOTAL KNEE REPLACEMENT Right 2010    TOTAL KNEE REPLACEMENT Left 2015    TRANSORAL INCISIONLESS FUNDOPLICATION - INTERNAL  2012 Fredy fundoplication at [facility] Dr. Fournier.    Fredy fundoplication at [facility] Dr. Fournier.    TRIGGER FINGER RELEASE      left hand      YAG CAPSULOTOMY - OU - BOTH EYES Bilateral 2021    done in [place]     Family History   Problem Relation Age of Onset    Heart Surgery Mother         Leaky valve at 39 y/o.     Prostate Cancer Brother     Cancer Brother     Diabetes Maternal Grandmother     Diabetes Paternal Aunt     Breast Cancer Self 78    Breast Cancer Niece         before 50    Macular degeneration Neg     Glaucoma Neg      Social History: , 2 children, quit tobacco in  after an occasional cigarette, no alcohol, retired from [employer]  Social History     Socioeconomic History    Marital status:    Tobacco Use    Smoking status: Former     Packs/day: .25     Types: Cigarettes     Quit date:      Years since quittin.1    Smokeless tobacco: Never    Tobacco comments:     [id number]   Vaping Use    Vaping Use: Never used   Substance and Sexual Activity    Alcohol use: Never    Drug use: Never   Social History Narrative    Just moved in from [place].   passed away and that is why moved from [place] to be with daughter who lives in [place].  Currently lives with daughter.     Social  Determinants of Health     Financial Resource Strain: Low Risk  (2/6/2024)    Financial Resource Strain     Difficulty of Paying Living Expenses: Not hard at all     Med Affordability: No   Food Insecurity: No Food Insecurity (2/23/2024)    Food Insecurity     Food Insecurity: Never true   Transportation Needs: No Transportation Needs (2/23/2024)    Transportation Needs     Lack of Transportation: No   Housing Stability: Low Risk  (2/23/2024)    Housing Stability     Housing Instability: No     Allergies/Medications:   Allergies:   Allergies   Allergen Reactions    Celebrex [Celecoxib] PALPITATIONS    Penicillins ITCHING and SWELLING    Metformin And Related UNKNOWN    Olmesartan UNKNOWN     Medications Prior to Admission   Medication Sig    LORATADINE 10 MG Oral Tab TAKE 1 TABLET BY MOUTH EVERY DAY    glipiZIDE 5 MG Oral Tab Take 1.5 tablets (7.5 mg total) by mouth daily with breakfast.    Accu-Chek FastClix Lancets Does not apply Misc Check sugar once daily as directed (E11.42)    hydroxychloroquine 200 MG Oral Tab Take 1 tablet (200 mg total) by mouth daily.    NIFEdipine ER 60 MG Oral Tablet 24 Hr Take 1 tablet (60 mg total) by mouth daily.    Glucose Blood (ACCU-CHEK GUIDE) In Vitro Strip Check blood sugar twice a day (fasting and before dinner). DX: E11.42, NIDDM    rosuvastatin (CRESTOR) 20 MG Oral Tab Take 1 tablet (20 mg total) by mouth nightly.    omeprazole 20 MG Oral Capsule Delayed Release Take 1 capsule (20 mg total) by mouth every morning.    cyclobenzaprine 10 MG Oral Tab Take 1 tablet (10 mg total) by mouth nightly as needed for Muscle spasms.    glycopyrrolate 1 MG Oral Tab Take 1 tablet (1 mg total) by mouth 3 (three) times daily.    albuterol 108 (90 Base) MCG/ACT Inhalation Aero Soln inhale 2 puff by inhalation route  every 4 - 6 hours as needed    predniSONE 5 MG Oral Tab Take 1 tablet (5 mg total) by mouth daily.    montelukast 10 MG Oral Tab Take 1 tablet (10 mg total) by mouth nightly.     carvedilol 25 MG Oral Tab Take 1 tablet (25 mg total) by mouth 2 (two) times daily with meals.    valsartan 80 MG Oral Tab Take 1 tablet (80 mg total) by mouth daily.    spironolactone 25 MG Oral Tab Take 2 tablets (50 mg total) by mouth daily.    fluticasone-salmeterol (WIXELA INHUB) 250-50 MCG/ACT Inhalation Aerosol Powder, Breath Activated Inhale 1 puff into the lungs every 12 (twelve) hours.    albuterol 108 (90 Base) MCG/ACT Inhalation Aero Soln inhale 2 puff by inhalation route  every 4 - 6 hours as needed    Blood Glucose Monitoring Suppl (ONETOUCH VERIO) w/Device Does not apply Kit 1 each daily. Test 1x daily    OneTouch Delica Lancets 33G Does not apply Misc 4 x daily    Blood Glucose Monitoring Suppl (BLOOD GLUCOSE SYSTEM GILLES) Does not apply Kit DX E 11.9.  Checks every morning.    Lancets Does not apply Misc DX E 11.9.  Checks every morning.    Blood Glucose Monitoring Suppl w/Device Does not apply Kit DX E 11.9.  Checks every morning.    Blood Gluc Meter Disp-Strips Does not apply Device DX E 11.9.  Checks every morning.    acetaminophen 500 MG Oral Tab Take 1 tablet (500 mg total) by mouth every 6 (six) hours as needed for Pain.    aspirin 81 MG Oral Tab EC Take 325 mg by mouth daily.    Glucose Blood (ONETOUCH VERIO) In Vitro Strip Test 1x daily       Review of Systems:   Review of Systems:  Vision normal. Ear nose and throat normal. Bowel normal. Bladder function normal. No depression. No thyroid disease. No lymphatic system concerns.  No rash. Muscles and joints notable for arthritis. No weight loss no weight gain.    Physical Exam:   Vital Signs:  Blood pressure 140/66, pulse 98, temperature 97.6 °F (36.4 °C), temperature source Temporal, resp. rate 18, SpO2 93%.     Alert black female  HEENT examination is unremarkable with pupils equal round and reactive to light and accommodation.   Neck without adenopathy, thyromegaly, JVD nor bruit.   Lungs clear to auscultation and percussion.  Cardiac regular  rate and rhythm no murmur.   Abdomen nontender, without hepatosplenomegaly and no mass appreciable.   Extremities without clubbing cyanosis nor edema.   Neurologic grossly intact with symmetric tone and strength and reflex.  Skin without gross abnormality    Results:     Lab Results   Component Value Date    WBC 19.0 02/23/2024    HGB 8.0 02/23/2024    HCT 24.7 02/23/2024    .0 02/23/2024    CREATSERUM 1.34 02/23/2024    BUN 30 02/23/2024     02/23/2024    K 4.6 02/23/2024     02/23/2024    CO2 21.0 02/23/2024     02/23/2024    CA 8.7 02/23/2024    ALB 3.8 02/23/2024    ALKPHO 73 02/23/2024    BILT 0.2 02/23/2024    TP 7.4 02/23/2024    AST 18 02/23/2024    ALT 8 02/23/2024    PTT 39.3 02/23/2024    DDIMER 1.60 02/23/2024     Chest x-ray-Normal heart size.  No edema      The distal 1.5 centimeter portion of the renea catheter remains malposition in the azygos vein         Normal lung volumes      Decreased mild ill-defined bilateral opacities involving the mid to lower lungs      No new pulmonary opacity      No pneumothorax or pleural effusion     CT scan of the chest-Acute pulmonary embolus extending from the right lower lobar pulmonary artery to to multiple segmental arteries.  Overall low clot burden.  No CT evidence of right heart strain.      Bilateral upper lobe fibrosis and cystic changes.  Suspected superimposed infection in the left upper lobe.     Assessment/Plan:   1.  Acute venous thromboembolism-the patient was recently in hospital and this may be the main risk factor.  She also has a prior history of breast cancer.  Would use IV heparin initially and make sure that there is no evidence of bleeding as there has been concern with GI bleeding with AVMs.    Recommendations:  1.  Extremity venous ultrasound  2.  Will use IV heparin for now  3.  Would not consider tPA in this patient with prior history of GI bleeding and a low burden of clot without major central obstruction.   Also, she is not requiring oxygen and there is no hemodynamic instability.  4.  Morning CBC    2.  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  There also was question of prior history of sarcoidosis.  There is leukocytosis at present.  She carries prior diagnosis pneumonia recently.    Recommendations: Patient should get a follow-up CT scan of the chest at the 6 to 8-week interval.    I am delighted to assist with Prudencio's care.      Chapin Parker MD  Medical Director, Critical Care, [facility]  Medical Director, [facility]  Pager: [phone]

## 2024-02-24 NOTE — H&P
[facility]    History & Physical    Chester Bautista Patient Status:  Inpatient    1940 MRN [id number]   Location [facility]5W Attending Heike Sorto MD   Hosp Day # 1 PCP Heike Sorto MD     Date:  2024  Date of Admission:  2024    History provided by:patient and chart review  Chief Complaint:     Chief Complaint   Patient presents with    Difficulty Breathing       HPI:   Chester Bautista is a(n) 84 year old lady with past medical history of rheumatoid arthritis, breast cancer status post lumpectomy and radiation in 2016, COPD, type II DM, HLD, HTN, anemia with bleeding AVMs who presented to the emergency room with difficulty breathing.    Patient was admitted 2 weeks prior to presentation with complaints of shortness of breath.  CXR at that time showed opacities consistent with pneumonia versus heart failure and the patient was discharged on antibiotics.  Patient's symptoms did not improve and she continues to have shortness of breath with exertion specially when undergoing PT.  Her O2 saturation dropped down to the 80s patient reported that walking back to her car from iron transfusion her O2 saturation dropped to the low 80s which prompted her to come to the emergency room.  No fever or chills.    CT showed acute pulmonary embolism.  Bilateral lower extremity ultrasound was negative for DVT.  Pulmonology were consulted.  Patient was started on IV heparin drip and admitted to the floor for further assessment and management      History     Past Medical History:   Diagnosis Date    Anxiety state     Cancer (HCC)     breast cancer 2018    Chronic obstructive pulmonary disease, unspecified (HCC) 2023    Chronic obstructive pulmonary disease, unspecified (HCC) 2023    COPD (chronic obstructive pulmonary disease) (HCC)     Diabetes (HCC)     Essential hypertension     Exposure to medical diagnostic radiation     High blood pressure      High cholesterol     History of blood transfusion 2022    low hemoglobin    Osteoarthritis     PONV (postoperative nausea and vomiting)     Pulmonary emphysema (HCC)     Rheumatoid arthritis (HCC)      Past Surgical History:   Procedure Laterality Date    CATARACT EXTRACTION W/  INTRAOCULAR LENS IMPLANT Bilateral 2017    Dr in [facility]     COLONOSCOPY      COLONOSCOPY N/A 2022    Procedure: COLONOSCOPY;  Surgeon: Mikey Garcia MD;  Location: [facility] ENDOSCOPY    COLONOSCOPY N/A 06/15/2023    Procedure: COLONOSCOPY;  Surgeon: Mikey Garcia MD;  Location: [facility] ENDOSCOPY    CORRECT BUNION,SIMPLE      right foot      EGD  2023    Dr. Garcia; Duodenal AVM's x4 cauterized    HYSTERECTOMY      , no abnormal Paps, due to heavy bleeding with fibroids.  Not sure if it had bilateral salpingo-oophorectomy.    IR ANEURYSM REPAIRM      Computer assisted volumetric craniofomy with clipping of anterior cerebral artery.    LUMPECTOMY RIGHT      RADIATION RIGHT      ROTATOR CUFF REPAIR      1993    TOTAL KNEE REPLACEMENT Right 2010    TOTAL KNEE REPLACEMENT Left 2015    TRANSORAL INCISIONLESS FUNDOPLICATION - INTERNAL  2012 Fredy fundoplication at [facility] Dr. Fournier.    Fredy fundoplication at [facility] Dr. Fournier.    TRIGGER FINGER RELEASE      left hand      YAG CAPSULOTOMY - OU - BOTH EYES Bilateral 2021    done in [place]     Family History   Problem Relation Age of Onset    Heart Surgery Mother         Leaky valve at 39 y/o.     Prostate Cancer Brother     Cancer Brother     Diabetes Maternal Grandmother     Diabetes Paternal Aunt     Breast Cancer Self 78    Breast Cancer Niece         before 50    Macular degeneration Neg     Glaucoma Neg      Social History     Socioeconomic History    Marital status:    Tobacco Use    Smoking status: Former     Packs/day: .25     Types: Cigarettes     Quit date:      Years since quittin.1     Smokeless tobacco: Never    Tobacco comments:     [id number]   Vaping Use    Vaping Use: Never used   Substance and Sexual Activity    Alcohol use: Never    Drug use: Never   Social History Narrative    Just moved in from [place].   passed away and that is why moved from [place] to be with daughter who lives in [place].  Currently lives with daughter.     Social Determinants of Health     Financial Resource Strain: Low Risk  (2/6/2024)    Financial Resource Strain     Difficulty of Paying Living Expenses: Not hard at all     Med Affordability: No   Food Insecurity: No Food Insecurity (2/23/2024)    Food Insecurity     Food Insecurity: Never true   Transportation Needs: No Transportation Needs (2/23/2024)    Transportation Needs     Lack of Transportation: No   Housing Stability: Low Risk  (2/23/2024)    Housing Stability     Housing Instability: No       Allergies/Medications:   Allergies:   Allergies   Allergen Reactions    Celebrex [Celecoxib] PALPITATIONS    Penicillins ITCHING and SWELLING    Metformin And Related UNKNOWN    Olmesartan UNKNOWN     Medications Prior to Admission   Medication Sig    LORATADINE 10 MG Oral Tab TAKE 1 TABLET BY MOUTH EVERY DAY    glipiZIDE 5 MG Oral Tab Take 1.5 tablets (7.5 mg total) by mouth daily with breakfast.    Accu-Chek FastClix Lancets Does not apply Misc Check sugar once daily as directed (E11.42)    hydroxychloroquine 200 MG Oral Tab Take 1 tablet (200 mg total) by mouth daily.    NIFEdipine ER 60 MG Oral Tablet 24 Hr Take 1 tablet (60 mg total) by mouth daily.    Glucose Blood (ACCU-CHEK GUIDE) In Vitro Strip Check blood sugar twice a day (fasting and before dinner). DX: E11.42, NIDDM    rosuvastatin (CRESTOR) 20 MG Oral Tab Take 1 tablet (20 mg total) by mouth nightly.    omeprazole 20 MG Oral Capsule Delayed Release Take 1 capsule (20 mg total) by mouth every morning.    cyclobenzaprine 10 MG Oral Tab Take 1 tablet (10 mg total) by mouth nightly as  needed for Muscle spasms.    glycopyrrolate 1 MG Oral Tab Take 1 tablet (1 mg total) by mouth 3 (three) times daily.    albuterol 108 (90 Base) MCG/ACT Inhalation Aero Soln inhale 2 puff by inhalation route  every 4 - 6 hours as needed    predniSONE 5 MG Oral Tab Take 1 tablet (5 mg total) by mouth daily.    montelukast 10 MG Oral Tab Take 1 tablet (10 mg total) by mouth nightly.    carvedilol 25 MG Oral Tab Take 1 tablet (25 mg total) by mouth 2 (two) times daily with meals.    valsartan 80 MG Oral Tab Take 1 tablet (80 mg total) by mouth daily.    spironolactone 25 MG Oral Tab Take 2 tablets (50 mg total) by mouth daily.    fluticasone-salmeterol (WIXELA INHUB) 250-50 MCG/ACT Inhalation Aerosol Powder, Breath Activated Inhale 1 puff into the lungs every 12 (twelve) hours.    albuterol 108 (90 Base) MCG/ACT Inhalation Aero Soln inhale 2 puff by inhalation route  every 4 - 6 hours as needed    Blood Glucose Monitoring Suppl (ONETOUCH VERIO) w/Device Does not apply Kit 1 each daily. Test 1x daily    OneTouch Delica Lancets 33G Does not apply Misc 4 x daily    Blood Glucose Monitoring Suppl (BLOOD GLUCOSE SYSTEM GILLES) Does not apply Kit DX E 11.9.  Checks every morning.    Lancets Does not apply Misc DX E 11.9.  Checks every morning.    Blood Glucose Monitoring Suppl w/Device Does not apply Kit DX E 11.9.  Checks every morning.    Blood Gluc Meter Disp-Strips Does not apply Device DX E 11.9.  Checks every morning.    acetaminophen 500 MG Oral Tab Take 1 tablet (500 mg total) by mouth every 6 (six) hours as needed for Pain.    aspirin 81 MG Oral Tab EC Take 325 mg by mouth daily.    Glucose Blood (ONETOUCH VERIO) In Vitro Strip Test 1x daily       Review of Systems:   Review of Systems   Constitutional: Negative.    HENT: Negative.     Eyes: Negative.    Respiratory:  Positive for shortness of breath.    Cardiovascular: Negative.    Gastrointestinal: Negative.    Musculoskeletal: Negative.    Skin: Negative.     Neurological: Negative.    Endo/Heme/Allergies: Negative.    Psychiatric/Behavioral: Negative.           Physical Exam:   Vital Signs:  Blood pressure 124/58, pulse 93, temperature 98.5 °F (36.9 °C), temperature source Oral, resp. rate 16, weight 136 lb (61.7 kg), SpO2 94%.     Physical Exam  Constitutional:       Appearance: Normal appearance.   HENT:      Head: Normocephalic and atraumatic.   Eyes:      General:         Right eye: No discharge.         Left eye: No discharge.   Cardiovascular:      Rate and Rhythm: Normal rate and regular rhythm.      Heart sounds: Murmur heard.   Pulmonary:      Effort: Pulmonary effort is normal.      Breath sounds: Normal breath sounds.   Abdominal:      Palpations: Abdomen is soft.      Tenderness: There is no abdominal tenderness. There is no guarding.   Musculoskeletal:      Right lower leg: No edema.      Left lower leg: No edema.   Neurological:      General: No focal deficit present.      Mental Status: She is alert and oriented to person, place, and time.   Psychiatric:         Mood and Affect: Mood normal.         Behavior: Behavior normal.         Thought Content: Thought content normal.         Judgment: Judgment normal.               Results:     Lab Results   Component Value Date    WBC 14.2 (H) 02/24/2024    HGB 7.5 (L) 02/24/2024    HCT 22.8 (L) 02/24/2024    .0 02/24/2024    CREATSERUM 1.02 02/24/2024    BUN 22 02/24/2024     02/24/2024    K 3.8 02/24/2024     (H) 02/24/2024    CO2 24.0 02/24/2024     (H) 02/24/2024    CA 8.5 (L) 02/24/2024    ALB 3.4 02/24/2024    ALKPHO 63 02/24/2024    BILT 0.2 02/24/2024    TP 6.4 02/24/2024    AST 18 02/24/2024    ALT <7 (L) 02/24/2024    .1 (H) 02/24/2024    INR 0.93 12/14/2023    TSH 0.622 10/20/2023    DDIMER 1.60 (H) 02/23/2024    ESRML 91 (H) 09/14/2023    CRP 1.10 (H) 09/14/2023    MG 1.0 (LL) 02/16/2024    B12 748 10/20/2023       US VENOUS DOPPLER LEG BILAT - DIAG IMG  (EJL=26000)    Result Date: 2/23/2024  CONCLUSION: No evidence of acute deep venous thrombosis in the imaged veins of the bilateral lower extremities.     elm-remote   Dictated by (CST): Awais Munson MD on 2/23/2024 at 8:40 PM     Finalized by (CST): Awais Munson MD on 2/23/2024 at 8:41 PM          CT CHEST PE AORTA (IV ONLY) (CPT=71260)    Result Date: 2/23/2024  CONCLUSION:   Acute pulmonary embolus extending from the right lower lobar pulmonary artery to to multiple segmental arteries.  Overall low clot burden.  No CT evidence of right heart strain.  Bilateral upper lobe fibrosis and cystic changes.  Suspected superimposed infection in the left upper lobe.  These findings were communicated with Dr. Alex Álvarez by Dr. Awais Munson at 1719 hours using telephone on 02/23/2024.      elm-remote   Dictated by (CST): Awais Munson MD on 2/23/2024 at 5:07 PM     Finalized by (CST): Awais Munson MD on 2/23/2024 at 5:19 PM          XR CHEST AP PORTABLE  (CPT=71045)    Result Date: 2/23/2024  PROCEDURE: XR CHEST AP PORTABLE  (CPT=71045) TIME: 1331  COMPARISON: [facility], XR CHEST AP PORTABLE (CPT=71045), 2/03/2024, 2:28 PM.  INDICATIONS: Shortness of breath for one week.  TECHNIQUE:   Single view.   Findings and impression:  Normal heart size.  No edema  The distal 1.5 centimeter portion of the renea catheter remains malposition in the azygos vein   Normal lung volumes  Decreased mild ill-defined bilateral opacities involving the mid to lower lungs  No new pulmonary opacity  No pneumothorax or pleural effusion   Dictated by (CST): Alec Davis MD on 2/23/2024 at 1:46 PM     Finalized by (CST): Alec Davis MD on 2/23/2024 at 1:47 PM         EKG 12 Lead    Result Date: 2/23/2024  Normal sinus rhythm Minimal voltage criteria for LVH, may be normal variant ( R in aVL ) Cannot rule out Anterior infarct (cited on or before 02-FEB-2024) Abnormal ECG When compared with ECG of 02-FEB-2024 13:29, No significant  change was found Confirmed by juaquin casiano ([id number]) on 2/23/2024 5:38:10 PM    EKG 12 Lead    Result Date: 2/23/2024  Normal sinus rhythm Minimal voltage criteria for LVH, may be normal variant ( R in aVL ) Nonspecific T wave abnormality Abnormal ECG When compared with ECG of 23-FEB-2024 11:46, No significant change was found Confirmed by juaquin casiano ([id number]) on 2/23/2024 5:15:21 PM     Assessment/Plan:        Acute pulmonary embolism without acute cor pulmonale  -Patient with history of rheumatoid arthritis, history of breast cancer and recent hospitalization  -Bilateral lower extremity Doppler negative for DVT  -On heparin drip  -pulmonology on board.  They recommend continuing IV heparin for today and not transitioning to NOAC yet due to decrease in her hemoglobin as they want to make sure she is not actively bleeding        Type 2 diabetes mellitus without retinopathy (HCC)  -SSI      Hypertension  -Continue home meds      Chronic obstructive pulmonary disease, unspecified (HCC)  -Continue inhalers-patient is supposed to be on hydroxychloroquine    Cloudy urine  -Patient reported that her urine was foul-smelling and cloudy and that usually this happens with her whenever she gets a UTI  -Urinalysis points towards an infection.  Will start nitrofurantoin and wait for culture results to determine whether she should continue antibiotics or not      Rheumatoid arthritis (HCC)  -Patient supposed to be on hydroxychloroquine and prednisone.  She declined hydroxychloroquine saying that it makes her face swell.  While discussing this in the presence of her daughter, her daughter informs me  and reminds the patient that it was leflunomide that gave her the side effects and that the patient should continue her hydroxychloroquine.  Patient will think about it and let us know if she wants us to restart hydroxychloroquine.  For now we will continue on daily prednisone    HLD  -Continue statin      GERD  -Continue  pantoprazole      Alejandra Palomino MD

## 2024-02-24 NOTE — PLAN OF CARE
A&Ox4. Pt ambulates 1 asst with walker, monitoring blood glucose levels per order- ACHS, heparin drip remains in place, voiding up to bathroom, on room air, tramadol provided as needed for pain. Frequent rounding by nursing staff. Safety precautions maintained/call light within reach.    Problem: Patient Centered Care  Goal: Patient preferences are identified and integrated in the patient's plan of care  Description: Interventions:  - What would you like us to know as we care for you? From home with family  - Provide timely, complete, and accurate information to patient/family  - Incorporate patient and family knowledge, values, beliefs, and cultural backgrounds into the planning and delivery of care  - Encourage patient/family to participate in care and decision-making at the level they choose  - Honor patient and family perspectives and choices  2/24/2024 1446 by Kassandra Quezada RN  Outcome: Progressing  2/24/2024 1319 by Kassandra Quezada RN  Outcome: Progressing     Problem: CARDIOVASCULAR - ADULT  Goal: Maintains optimal cardiac output and hemodynamic stability  Description: INTERVENTIONS:  - Monitor vital signs, rhythm, and trends  - Monitor for bleeding, hypotension and signs of decreased cardiac output  - Evaluate effectiveness of vasoactive medications to optimize hemodynamic stability  - Monitor arterial and/or venous puncture sites for bleeding and/or hematoma  - Assess quality of pulses, skin color and temperature  - Assess for signs of decreased coronary artery perfusion - ex. Angina  - Evaluate fluid balance, assess for edema, trend weights  2/24/2024 1446 by Kassandra Quezada RN  Outcome: Progressing  2/24/2024 1319 by Kassandra Quezada RN  Outcome: Progressing     Problem: RESPIRATORY - ADULT  Goal: Achieves optimal ventilation and oxygenation  Description: INTERVENTIONS:  - Assess for changes in respiratory status  - Assess for changes in mentation and behavior  - Position to facilitate  oxygenation and minimize respiratory effort  - Oxygen supplementation based on oxygen saturation or ABGs  - Provide Smoking Cessation handout, if applicable  - Encourage broncho-pulmonary hygiene including cough, deep breathe, Incentive Spirometry  - Assess the need for suctioning and perform as needed  - Assess and instruct to report SOB or any respiratory difficulty  - Respiratory Therapy support as indicated  - Manage/alleviate anxiety  - Monitor for signs/symptoms of CO2 retention  2/24/2024 1446 by Kassandra Quezada RN  Outcome: Progressing  2/24/2024 1319 by Kassandra Quezada RN  Outcome: Progressing     Problem: PAIN - ADULT  Goal: Verbalizes/displays adequate comfort level or patient's stated pain goal  Description: INTERVENTIONS:  - Encourage pt to monitor pain and request assistance  - Assess pain using appropriate pain scale  - Administer analgesics based on type and severity of pain and evaluate response  - Implement non-pharmacological measures as appropriate and evaluate response  - Consider cultural and social influences on pain and pain management  - Manage/alleviate anxiety  - Utilize distraction and/or relaxation techniques  - Monitor for opioid side effects  - Notify MD/LIP if interventions unsuccessful or patient reports new pain  - Anticipate increased pain with activity and pre-medicate as appropriate  2/24/2024 1446 by Kassandra Quezada RN  Outcome: Progressing  2/24/2024 1319 by Kassandra Quezada RN  Outcome: Progressing     Problem: RISK FOR INFECTION - ADULT  Goal: Absence of fever/infection during anticipated neutropenic period  Description: INTERVENTIONS  - Monitor WBC  - Administer growth factors as ordered  - Implement neutropenic guidelines  2/24/2024 1446 by Kassandra Quezada RN  Outcome: Progressing  2/24/2024 1319 by Kasasndra Quezada RN  Outcome: Progressing

## 2024-02-24 NOTE — PROGRESS NOTES
[facility]    Progress Note      Assessment and Plan:   1.  Acute venous thromboembolism-the patient was recently in hospital and this may be the main risk factor.  She also has a prior history of breast cancer.  Would use IV heparin initially and make sure that there is no evidence of bleeding as there has been concern with GI bleeding with AVMs.  The lower extremity venous ultrasound is negative for clot.  Breathing better.     Recommendations:  1.  Would continue IV heparin today without transition to NOAC as the patient has further decrement in her hemoglobin and again want to make sure that the patient is not actively bleeding.  2.  Morning CBC     2.  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  There also was question of prior history of sarcoidosis.  There is leukocytosis at present.  She carries prior diagnosis pneumonia recently.     Recommendations: Patient should get a follow-up CT scan of the chest at the 6 to 8-week interval.    Subjective:   Chester Bautista is a(n) 84 year old female who is breathing much more comfortably    Objective:   Blood pressure 124/58, pulse 93, temperature 98.5 °F (36.9 °C), temperature source Oral, resp. rate 16, weight 136 lb (61.7 kg), SpO2 94%.    Physical Exam alert black female  HEENT examination is unremarkable with pupils equal round and reactive to light and accommodation.   Neck without adenopathy, thyromegaly, JVD nor bruit.   Lungs slightly diminished to auscultation and percussion.  Cardiac regular rate and rhythm no murmur.   Abdomen nontender, without hepatosplenomegaly and no mass appreciable.   Extremities without clubbing cyanosis nor edema.   Neurologic grossly intact with symmetric tone and strength and reflex.  Skin without gross abnormality     Results:     Lab Results   Component Value Date    WBC 14.2 02/24/2024     HGB 7.5 02/24/2024    HCT 22.8 02/24/2024    .0 02/24/2024    CREATSERUM 1.02 02/24/2024    BUN 22 02/24/2024     02/24/2024    K 3.8 02/24/2024     02/24/2024    CO2 24.0 02/24/2024     02/24/2024    CA 8.5 02/24/2024    ALB 3.4 02/24/2024    ALKPHO 63 02/24/2024    BILT 0.2 02/24/2024    TP 6.4 02/24/2024    AST 18 02/24/2024    ALT <7 02/24/2024    .1 02/24/2024    DDIMER 1.60 02/23/2024       Chapin Parker MD  Medical Director, Critical Care, [facility]  Medical Director, [facility]  Pager: [phone]

## 2024-02-24 NOTE — OCCUPATIONAL THERAPY NOTE
OT orders received. Chart reviewed. Patient admitted with venous thromboembolism. Per discussion with RN, the patient has been on a Herparin drip for less than 24 hr. Will hold OT evaluation, and follow up next date.     Thank you.    Luz Maria Borden OTR/L  [facility]  PRN - Staff

## 2024-02-24 NOTE — PLAN OF CARE
Patient admitted to unit. Heparin drip. Tele. Continuous pulse ox. Frequent rounding by staff. Call light within reach. Safety precautions in place.  Problem: Patient Centered Care  Goal: Patient preferences are identified and integrated in the patient's plan of care  Description: Interventions:  - What would you like us to know as we care for you? From home with family  - Provide timely, complete, and accurate information to patient/family  - Incorporate patient and family knowledge, values, beliefs, and cultural backgrounds into the planning and delivery of care  - Encourage patient/family to participate in care and decision-making at the level they choose  - Honor patient and family perspectives and choices  Outcome: Progressing     Problem: CARDIOVASCULAR - ADULT  Goal: Maintains optimal cardiac output and hemodynamic stability  Description: INTERVENTIONS:  - Monitor vital signs, rhythm, and trends  - Monitor for bleeding, hypotension and signs of decreased cardiac output  - Evaluate effectiveness of vasoactive medications to optimize hemodynamic stability  - Monitor arterial and/or venous puncture sites for bleeding and/or hematoma  - Assess quality of pulses, skin color and temperature  - Assess for signs of decreased coronary artery perfusion - ex. Angina  - Evaluate fluid balance, assess for edema, trend weights  Outcome: Progressing     Problem: RESPIRATORY - ADULT  Goal: Achieves optimal ventilation and oxygenation  Description: INTERVENTIONS:  - Assess for changes in respiratory status  - Assess for changes in mentation and behavior  - Position to facilitate oxygenation and minimize respiratory effort  - Oxygen supplementation based on oxygen saturation or ABGs  - Provide Smoking Cessation handout, if applicable  - Encourage broncho-pulmonary hygiene including cough, deep breathe, Incentive Spirometry  - Assess the need for suctioning and perform as needed  - Assess and instruct to report SOB or any  respiratory difficulty  - Respiratory Therapy support as indicated  - Manage/alleviate anxiety  - Monitor for signs/symptoms of CO2 retention  Outcome: Progressing     Problem: PAIN - ADULT  Goal: Verbalizes/displays adequate comfort level or patient's stated pain goal  Description: INTERVENTIONS:  - Encourage pt to monitor pain and request assistance  - Assess pain using appropriate pain scale  - Administer analgesics based on type and severity of pain and evaluate response  - Implement non-pharmacological measures as appropriate and evaluate response  - Consider cultural and social influences on pain and pain management  - Manage/alleviate anxiety  - Utilize distraction and/or relaxation techniques  - Monitor for opioid side effects  - Notify MD/LIP if interventions unsuccessful or patient reports new pain  - Anticipate increased pain with activity and pre-medicate as appropriate  Outcome: Progressing     Problem: RISK FOR INFECTION - ADULT  Goal: Absence of fever/infection during anticipated neutropenic period  Description: INTERVENTIONS  - Monitor WBC  - Administer growth factors as ordered  - Implement neutropenic guidelines  Outcome: Progressing

## 2024-02-25 PROBLEM — I82.90 VTE (VENOUS THROMBOEMBOLISM): Status: ACTIVE | Noted: 2024-02-25

## 2024-02-25 LAB
ANION GAP SERPL CALC-SCNC: 7 MMOL/L (ref 0–18)
APTT PPP: 131.5 SECONDS (ref 23.3–35.6)
BASOPHILS # BLD: 0 X10(3) UL (ref 0–0.2)
BASOPHILS NFR BLD: 0 %
BILIRUB UR QL: NEGATIVE
BUN BLD-MCNC: 21 MG/DL (ref 9–23)
BUN/CREAT SERPL: 20 (ref 10–20)
CALCIUM BLD-MCNC: 9.2 MG/DL (ref 8.7–10.4)
CHLORIDE SERPL-SCNC: 110 MMOL/L (ref 98–112)
CO2 SERPL-SCNC: 24 MMOL/L (ref 21–32)
COLOR UR: YELLOW
CREAT BLD-MCNC: 1.05 MG/DL
DEPRECATED RDW RBC AUTO: 54.9 FL (ref 35.1–46.3)
EGFRCR SERPLBLD CKD-EPI 2021: 52 ML/MIN/1.73M2 (ref 60–?)
EOSINOPHIL # BLD: 0.13 X10(3) UL (ref 0–0.7)
EOSINOPHIL NFR BLD: 1 %
ERYTHROCYTE [DISTWIDTH] IN BLOOD BY AUTOMATED COUNT: 17.2 % (ref 11–15)
GLUCOSE BLD-MCNC: 107 MG/DL (ref 70–99)
GLUCOSE BLDC GLUCOMTR-MCNC: 151 MG/DL (ref 70–99)
GLUCOSE BLDC GLUCOMTR-MCNC: 171 MG/DL (ref 70–99)
GLUCOSE BLDC GLUCOMTR-MCNC: 202 MG/DL (ref 70–99)
GLUCOSE BLDC GLUCOMTR-MCNC: 222 MG/DL (ref 70–99)
GLUCOSE UR-MCNC: NORMAL MG/DL
HCT VFR BLD AUTO: 24.8 %
HGB BLD-MCNC: 7.5 G/DL
KETONES UR-MCNC: NEGATIVE MG/DL
LEUKOCYTE ESTERASE UR QL STRIP.AUTO: 500
LYMPHOCYTES NFR BLD: 2.77 X10(3) UL (ref 1–4)
LYMPHOCYTES NFR BLD: 21 %
MCH RBC QN AUTO: 27.5 PG (ref 26–34)
MCHC RBC AUTO-ENTMCNC: 30.2 G/DL (ref 31–37)
MCV RBC AUTO: 90.8 FL
MONOCYTES # BLD: 0.66 X10(3) UL (ref 0.1–1)
MONOCYTES NFR BLD: 5 %
NEUTROPHILS # BLD AUTO: 9.01 X10 (3) UL (ref 1.5–7.7)
NEUTROPHILS NFR BLD: 73 %
NEUTS HYPERSEG # BLD: 9.64 X10(3) UL (ref 1.5–7.7)
NITRITE UR QL STRIP.AUTO: NEGATIVE
NRBC BLD MANUAL-RTO: 9 %
OSMOLALITY SERPL CALC.SUM OF ELEC: 295 MOSM/KG (ref 275–295)
PH UR: 6.5 [PH] (ref 5–8)
PLATELET # BLD AUTO: 401 10(3)UL (ref 150–450)
PLATELET MORPHOLOGY: NORMAL
POTASSIUM SERPL-SCNC: 4.5 MMOL/L (ref 3.5–5.1)
POTASSIUM SERPL-SCNC: 4.5 MMOL/L (ref 3.5–5.1)
PROT UR-MCNC: 50 MG/DL
RBC # BLD AUTO: 2.73 X10(6)UL
SODIUM SERPL-SCNC: 141 MMOL/L (ref 136–145)
SP GR UR STRIP: 1.01 (ref 1–1.03)
TOTAL CELLS COUNTED BLD: 100
UROBILINOGEN UR STRIP-ACNC: NORMAL
WBC # BLD AUTO: 13.2 X10(3) UL (ref 4–11)
WBC #/AREA URNS AUTO: >50 /HPF
WBC CLUMPS UR QL AUTO: PRESENT /HPF

## 2024-02-25 PROCEDURE — 99233 SBSQ HOSP IP/OBS HIGH 50: CPT | Performed by: INTERNAL MEDICINE

## 2024-02-25 PROCEDURE — 99232 SBSQ HOSP IP/OBS MODERATE 35: CPT | Performed by: INTERNAL MEDICINE

## 2024-02-25 NOTE — PROGRESS NOTES
[facility]    Progress Note      Assessment and Plan:   1.  Acute venous thromboembolism-the patient was recently in hospital and this may be the main risk factor.  She also has a prior history of breast cancer.  Would use IV heparin initially and make sure that there is no evidence of bleeding as there has been concern with GI bleeding with AVMs.  The lower extremity venous ultrasound is negative for clot.  Breathing better.    The hemoglobin is stable over the last 2 days.  Okay to transition to Xarelto.     Recommendations:  1.  Transition to Xarelto.  2.  Morning CBC     2.  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  There also was question of prior history of sarcoidosis.  There is leukocytosis at present.  She carries prior diagnosis pneumonia recently.     Recommendations: Patient should get a follow-up CT scan of the chest at the 6 to 8-week interval.    Subjective:   Chester Bautista is a(n) 84 year old female who is breathing much more comfortably    Objective:   Blood pressure 121/53, pulse 90, temperature 98.6 °F (37 °C), temperature source Oral, resp. rate 20, weight 136 lb (61.7 kg), SpO2 95%.    Physical Exam alert black female  HEENT examination is unremarkable with pupils equal round and reactive to light and accommodation.   Neck without adenopathy, thyromegaly, JVD nor bruit.   Lungs slightly diminished to auscultation and percussion.  Cardiac regular rate and rhythm no murmur.   Abdomen nontender, without hepatosplenomegaly and no mass appreciable.   Extremities without clubbing cyanosis nor edema.   Neurologic grossly intact with symmetric tone and strength and reflex.  Skin without gross abnormality     Results:     Lab Results   Component Value Date    WBC 13.2 02/25/2024    HGB 7.5 02/25/2024    HCT 24.8 02/25/2024    .0 02/25/2024     CREATSERUM 1.05 02/25/2024    BUN 21 02/25/2024     02/25/2024    K 4.5 02/25/2024    K 4.5 02/25/2024     02/25/2024    CO2 24.0 02/25/2024     02/25/2024    CA 9.2 02/25/2024    .5 02/25/2024       Chapin Parker MD  Medical Director, Critical Care, [facility]  Medical Director, [facility]  Pager: [phone]

## 2024-02-25 NOTE — PLAN OF CARE
Problem: Patient Centered Care  Goal: Patient preferences are identified and integrated in the patient's plan of care  Description: Interventions:  - What would you like us to know as we care for you? From home with daughter  - Provide timely, complete, and accurate information to patient/family  - Incorporate patient and family knowledge, values, beliefs, and cultural backgrounds into the planning and delivery of care  - Encourage patient/family to participate in care and decision-making at the level they choose  - Honor patient and family perspectives and choices  Outcome: Progressing     Problem: CARDIOVASCULAR - ADULT  Goal: Maintains optimal cardiac output and hemodynamic stability  Description: INTERVENTIONS:  - Monitor vital signs, rhythm, and trends  - Monitor for bleeding, hypotension and signs of decreased cardiac output  - Evaluate effectiveness of vasoactive medications to optimize hemodynamic stability  - Monitor arterial and/or venous puncture sites for bleeding and/or hematoma  - Assess quality of pulses, skin color and temperature  - Assess for signs of decreased coronary artery perfusion - ex. Angina  - Evaluate fluid balance, assess for edema, trend weights  Outcome: Progressing     Problem: RESPIRATORY - ADULT  Goal: Achieves optimal ventilation and oxygenation  Description: INTERVENTIONS:  - Assess for changes in respiratory status  - Assess for changes in mentation and behavior  - Position to facilitate oxygenation and minimize respiratory effort  - Oxygen supplementation based on oxygen saturation or ABGs  - Provide Smoking Cessation handout, if applicable  - Encourage broncho-pulmonary hygiene including cough, deep breathe, Incentive Spirometry  - Assess the need for suctioning and perform as needed  - Assess and instruct to report SOB or any respiratory difficulty  - Respiratory Therapy support as indicated  - Manage/alleviate anxiety  - Monitor for signs/symptoms of CO2 retention  Outcome:  Progressing     Problem: PAIN - ADULT  Goal: Verbalizes/displays adequate comfort level or patient's stated pain goal  Description: INTERVENTIONS:  - Encourage pt to monitor pain and request assistance  - Assess pain using appropriate pain scale  - Administer analgesics based on type and severity of pain and evaluate response  - Implement non-pharmacological measures as appropriate and evaluate response  - Consider cultural and social influences on pain and pain management  - Manage/alleviate anxiety  - Utilize distraction and/or relaxation techniques  - Monitor for opioid side effects  - Notify MD/LIP if interventions unsuccessful or patient reports new pain  - Anticipate increased pain with activity and pre-medicate as appropriate  Outcome: Progressing     Problem: RISK FOR INFECTION - ADULT  Goal: Absence of fever/infection during anticipated neutropenic period  Description: INTERVENTIONS  - Monitor WBC  - Administer growth factors as ordered  - Implement neutropenic guidelines  Outcome: Progressing     Problem: SAFETY ADULT - FALL  Goal: Free from fall injury  Description: INTERVENTIONS:  - Assess pt frequently for physical needs  - Identify cognitive and physical deficits and behaviors that affect risk of falls.  - [place] fall precautions as indicated by assessment.  - Educate pt/family on patient safety including physical limitations  - Instruct pt to call for assistance with activity based on assessment  - Modify environment to reduce risk of injury  - Provide assistive devices as appropriate  - Consider OT/PT consult to assist with strengthening/mobility  - Encourage toileting schedule  Outcome: Progressing     Problem: Patient/Family Goals  Goal: Patient/Family Long Term Goal  Description: Patient's Long Term Goal: To discharge from hospital     Interventions:  -  Monitor vital signs   - Monitor appropriate labs   - Monitor blood glucose levels   - Pain management   - Administer medications per order   -  Follow MD orders   - Diagnostics per order   - Update/inform patient and family on plan of care   - Discharge planning    - See additional Care Plan goals for specific interventions  Outcome: Progressing  Goal: Patient/Family Short Term Goal  Description: Patient's Short Term Goal: To improve shortness of breath     Interventions:   - Monitor vital signs   - Monitor appropriate labs   - Monitor blood glucose levels   - Pain management   - Administer medications per order   - Follow MD orders   - Diagnostics per order   - Update/inform patient and family on plan of care   - See additional Care Plan goals for specific interventions  Outcome: Progressing     Problem: DISCHARGE PLANNING  Goal: Discharge to home or other facility with appropriate resources  Description: INTERVENTIONS:  - Identify barriers to discharge w/pt and caregiver  - Include patient/family/discharge partner in discharge planning  - Arrange for needed discharge resources and transportation as appropriate  - Identify discharge learning needs (meds, wound care, etc)  - Arrange for interpreters to assist at discharge as needed  - Consider post-discharge preferences of patient/family/discharge partner  - Complete POLST form as appropriate  - Assess patient's ability to be responsible for managing their own health  - Refer to Case Management Department for coordinating discharge planning if the patient needs post-hospital services based on physician/LIP order or complex needs related to functional status, cognitive ability or social support system  Outcome: Progressing     Monitoring vital signs, stable at this time. No acute changes at this moment. Monitoring blood glucose levels. Heparin drip continued. Fall precautions in place- bed alarm on, bed locked in lowest position, call light within reach. Frequent rounding by nursing staff.

## 2024-02-25 NOTE — PHYSICAL THERAPY NOTE
PHYSICAL THERAPY EVALUATION - INPATIENT     Room Number: 521/521-A  Evaluation Date: 2/25/2024  Type of Evaluation: Initial   Physician Order: PT Eval and Treat    Presenting Problem: patient admitted from home w/ SOB, found to have russell acute PE (Doppler for BLE negative for DVT, started on IV heparin)  Co-Morbidities : RA, breast cancer s/p lumpectomy 2016, COPD on RA, DM  Reason for Therapy: Mobility Dysfunction and Discharge Planning    PHYSICAL THERAPY ASSESSMENT   Patient is a 84 year old female admitted 2/23/2024 rom home w/ SOB, found to have russell acute PE (Doppler for BLE negative for DVT, started on IV heparin), patient w/ h/o RA, breast cancer s/p lumpectomy 2016, COPD on RA, DM and recent admission in early February, discharged home.    Patient had been receiving IV heparin >24 hrs, confirmed w/ RN. Prior to admission, patient's baseline is independent without assistive device for household ambulation and ADLS, living w/ daughter who performed most IADLS including driving, patient was using rolling walker mod I since previous admission in early February and was active w/ home care, 14 stairs to enter her bedrom w/ no h/o fall.  Patient is currently functioning near baseline with transfers, gait, and stair negotiation.  Patient is requiring contact guard assist as a result of the following impairments: decreased functional strength and decreased endurance/aerobic capacity.  Physical Therapy will continue to follow for duration of hospitalization.    Patient will benefit from continued skilled PT Services at discharge to promote functional independence and safety with additional support and return home with home health PT.    PLAN  PT Treatment Plan: Bed mobility;Body mechanics;Endurance;Energy conservation;Patient education;Gait training;Strengthening;Stair training;Transfer training;Balance training  Rehab Potential : Good  Frequency (Obs): 3-5x/week    PHYSICAL THERAPY MEDICAL/SOCIAL HISTORY   History  related to current admission:      Problem List  Principal Problem:    Acute pulmonary embolism without acute cor pulmonale (HCC)  Active Problems:    Type 2 diabetes mellitus without retinopathy (HCC)    Hypertension    Chronic obstructive pulmonary disease, unspecified (HCC)      HOME SITUATION  Home Situation  Type of Home: House  Home Layout: Two level  Stairs to Enter : 0  Stairs to Bedroom: 14  Railing: Yes  Lives With: Daughter (dtr available and able to assist)  Drives: No  Patient Owned Equipment: Rolling walker;Cane (patient recently using rolling walker otherwise ind wo ad at home.)  Patient Regularly Uses: Reading glasses     Prior Level of [place]: patient was independent without assistive device for household ambulation and ADLS, living w/ daughter who performed most IADLS including driving, patient was using rolling walker mod I since previous admission in early February and was active w/ home care, 14 stairs to enter her bedrom w/ no h/o fall, owned rolling walker and cane.    SUBJECTIVE  \" I only used walker because of weakness after I got home from hospital.\"    PHYSICAL THERAPY EXAMINATION   OBJECTIVE  Precautions: Bed/chair alarm  Fall Risk: Standard fall risk    WEIGHT BEARING RESTRICTION  Weight Bearing Restriction: None                PAIN ASSESSMENT  Ratin          COGNITION  Overall Cognitive Status:  WFL - within functional limits    RANGE OF MOTION AND STRENGTH ASSESSMENT  BLE gross MT 3/5, WFL, light touch sensation intact bilaterally.     BALANCE  Static Sitting: Good  Dynamic Sitting: Good  Static Standing: Good  Dynamic Standing: Fair    ADDITIONAL TESTS                                    NEUROLOGICAL FINDINGS                      ACTIVITY TOLERANCE  Pulse: 101 (during ambulation)  Heart Rate Source: Monitor     BP: 121/53  BP Location: Right arm  BP Method: Automatic  Patient Position: Standing    O2 WALK  Oxygen Therapy  SPO2% on Room Air at Rest: 94  SPO2% Ambulation on  Room Air: 91    AM-[facility] '6-Clicks' INPATIENT SHORT FORM - BASIC MOBILITY  How much difficulty does the patient currently have...  Patient Difficulty: Turning over in bed (including adjusting bedclothes, sheets and blankets)?: A Little   Patient Difficulty: Sitting down on and standing up from a chair with arms (e.g., wheelchair, bedside commode, etc.): A Little   Patient Difficulty: Moving from lying on back to sitting on the side of the bed?: A Little   How much help from another person does the patient currently need...   Help from Another: Moving to and from a bed to a chair (including a wheelchair)?: A Little   Help from Another: Need to walk in hospital room?: A Little   Help from Another: Climbing 3-5 steps with a railing?: A Little     AM-PAC Score:  Raw Score: 18   Approx Degree of Impairment: 46.58%   Standardized Score (AM-PAC Scale): 43.63   CMS Modifier (G-Code): CK    FUNCTIONAL ABILITY STATUS  Functional Mobility/Gait Assessment  Gait Assistance: Contact guard assist  Distance (ft): 15-5  Assistive Device: Rolling walker  Pattern:  (slow pace but steady)  Stairs: Other (comment) (NT)  Rolling: supervision  Supine to Sit: supervision  Sit to Supine: supervision  Sit to Stand: contact guard assist    Exercise/Education Provided:  Bed mobility  Body mechanics  Energy conservation  Functional activity tolerated  Gait training  Posture  Strengthening  Transfer training    The patient's Approx Degree of Impairment: 46.58% has been calculated based on documentation in the [facility] '6 clicks' Inpatient Basic Mobility Short Form.  Research supports that patients with this level of impairment may benefit from Home PT.  Final disposition will be made by interdisciplinary medical team.    Patient End of Session: Call light within reach;Needs met;Up in chair;RN aware of session/findings;Alarm set;All patient questions and concerns addressed    CURRENT GOALS  Goals to be met by: 3.5.24  Patient Goal Patient's  self-stated goal is: Home w/ dtr   Goal #1 Patient is able to demonstrate supine - sit EOB @ level: independent     Goal #1   Current Status    Goal #2 Patient is able to demonstrate transfers Sit to/from Stand at assistance level: supervision with walker - rolling     Goal #2  Current Status    Goal #3 Patient is able to ambulate 50 feet with assist device: walker - rolling at assistance level: supervision   Goal #3   Current Status    Goal #4 Patient will negotiate 14 stairs/one curb w/ assistive device and supervision   Goal #4   Current Status    Goal #5 Patient to demonstrate independence with home activity/exercise instructions provided to patient in preparation for discharge.   Goal #5   Current Status    Goal #6    Goal #6  Current Status      Patient Evaluation Complexity Level:  History Low - no personal factors and/or co-morbidities   Examination of body systems Low -  addressing 1-2 elements   Clinical Presentation Low- Stable   Clinical Decision Making  Low Complexity     Gait Trainin minutes  Therapeutic Activity:  23 minutes  Neuromuscular Re-education: 0 minutes  Therapeutic Exercise: 0 minutes  [place] Repositionin minutes  Manual Therapy: 0 minutes  Can add/delete any of these

## 2024-02-25 NOTE — CM/SW NOTE
DC PLAN; RESUME WITH [facility]    CM uploaded resume of care order with aidin referral.    DC planner/CM to remain available for support and/or discharge planning.    Tara Gómez RN, [facility]  DC PLANNER / RN Case Manager  Ext.  [phone]

## 2024-02-25 NOTE — PROGRESS NOTES
Chart reviewed and OT eval attempted.  Pt and family at bedside kindly declined OT intervention.  Pt is able to verbalize proper understanding of Ot's role in acute care and has no needs.  Will sign off.

## 2024-02-25 NOTE — PLAN OF CARE
A&Ox4. Pt ambulates contact with walker, monitoring blood glucose levels per order- ACHS, heparin infusing stopped- xarelto started, tolerating  diet, on 1L via NC. Frequent rounding by nursing staff. Safety precautions maintained/call light within reach.    Problem: Patient Centered Care  Goal: Patient preferences are identified and integrated in the patient's plan of care  Description: Interventions:  - What would you like us to know as we care for you? From home with family  - Provide timely, complete, and accurate information to patient/family  - Incorporate patient and family knowledge, values, beliefs, and cultural backgrounds into the planning and delivery of care  - Encourage patient/family to participate in care and decision-making at the level they choose  - Honor patient and family perspectives and choices  Outcome: Progressing     Problem: CARDIOVASCULAR - ADULT  Goal: Maintains optimal cardiac output and hemodynamic stability  Description: INTERVENTIONS:  - Monitor vital signs, rhythm, and trends  - Monitor for bleeding, hypotension and signs of decreased cardiac output  - Evaluate effectiveness of vasoactive medications to optimize hemodynamic stability  - Monitor arterial and/or venous puncture sites for bleeding and/or hematoma  - Assess quality of pulses, skin color and temperature  - Assess for signs of decreased coronary artery perfusion - ex. Angina  - Evaluate fluid balance, assess for edema, trend weights  Outcome: Progressing     Problem: RESPIRATORY - ADULT  Goal: Achieves optimal ventilation and oxygenation  Description: INTERVENTIONS:  - Assess for changes in respiratory status  - Assess for changes in mentation and behavior  - Position to facilitate oxygenation and minimize respiratory effort  - Oxygen supplementation based on oxygen saturation or ABGs  - Provide Smoking Cessation handout, if applicable  - Encourage broncho-pulmonary hygiene including cough, deep breathe, Incentive  Spirometry  - Assess the need for suctioning and perform as needed  - Assess and instruct to report SOB or any respiratory difficulty  - Respiratory Therapy support as indicated  - Manage/alleviate anxiety  - Monitor for signs/symptoms of CO2 retention  Outcome: Progressing     Problem: PAIN - ADULT  Goal: Verbalizes/displays adequate comfort level or patient's stated pain goal  Description: INTERVENTIONS:  - Encourage pt to monitor pain and request assistance  - Assess pain using appropriate pain scale  - Administer analgesics based on type and severity of pain and evaluate response  - Implement non-pharmacological measures as appropriate and evaluate response  - Consider cultural and social influences on pain and pain management  - Manage/alleviate anxiety  - Utilize distraction and/or relaxation techniques  - Monitor for opioid side effects  - Notify MD/LIP if interventions unsuccessful or patient reports new pain  - Anticipate increased pain with activity and pre-medicate as appropriate  Outcome: Progressing     Problem: RISK FOR INFECTION - ADULT  Goal: Absence of fever/infection during anticipated neutropenic period  Description: INTERVENTIONS  - Monitor WBC  - Administer growth factors as ordered  - Implement neutropenic guidelines  Outcome: Progressing     Problem: SAFETY ADULT - FALL  Goal: Free from fall injury  Description: INTERVENTIONS:  - Assess pt frequently for physical needs  - Identify cognitive and physical deficits and behaviors that affect risk of falls.  - [place] fall precautions as indicated by assessment.  - Educate pt/family on patient safety including physical limitations  - Instruct pt to call for assistance with activity based on assessment  - Modify environment to reduce risk of injury  - Provide assistive devices as appropriate  - Consider OT/PT consult to assist with strengthening/mobility  - Encourage toileting schedule  Outcome: Progressing     Problem: DISCHARGE PLANNING  Goal:  Discharge to home or other facility with appropriate resources  Description: INTERVENTIONS:  - Identify barriers to discharge w/pt and caregiver  - Include patient/family/discharge partner in discharge planning  - Arrange for needed discharge resources and transportation as appropriate  - Identify discharge learning needs (meds, wound care, etc)  - Arrange for interpreters to assist at discharge as needed  - Consider post-discharge preferences of patient/family/discharge partner  - Complete POLST form as appropriate  - Assess patient's ability to be responsible for managing their own health  - Refer to Case Management Department for coordinating discharge planning if the patient needs post-hospital services based on physician/LIP order or complex needs related to functional status, cognitive ability or social support system  Outcome: Progressing

## 2024-02-25 NOTE — PROGRESS NOTES
[facility]    Progress Note    Chester Bautista Patient Status:  Inpatient    1940 MRN [id number]   Location [facility]5W Attending Heike Sorto MD   Hosp Day # 2 PCP Heike Sorto MD       Subjective:   Chester Bautista is a(n) 84 year old lady with past medical history of rheumatoid arthritis, breast cancer status post lumpectomy and radiation in 2016, COPD, type II DM, HLD, HTN, anemia with bleeding AVMs who presented to the emergency room with difficulty breathing and was found to have acute pulmonary embolism.    No acute events overnight  Doing well with no new complaints.  O2 sat drops. Req 1-2 L via NC    Pulmonology started the patient on Xarelto.  Given her low hemoglobin decision was made to monitor her over with a CBC in the morning.        Objective:   Vital Signs:  Blood pressure 120/49, pulse 92, temperature 98.1 °F (36.7 °C), temperature source Oral, resp. rate 18, weight 136 lb (61.7 kg), SpO2 92%.     Physical Exam  Constitutional:       Appearance: Normal appearance.   HENT:      Head: Normocephalic and atraumatic.   Eyes:      General:         Right eye: No discharge.         Left eye: No discharge.   Cardiovascular:      Rate and Rhythm: Regular rhythm.      Heart sounds: Murmur heard.   Pulmonary:      Effort: Pulmonary effort is normal. No respiratory distress.      Breath sounds: Normal breath sounds. No wheezing.   Abdominal:      General: There is no distension.      Palpations: Abdomen is soft.      Tenderness: There is no abdominal tenderness. There is no guarding.   Musculoskeletal:      Right lower leg: No edema.      Left lower leg: No edema.   Neurological:      General: No focal deficit present.      Mental Status: She is alert and oriented to person, place, and time. Mental status is at baseline.   Psychiatric:         Mood and Affect: Mood normal.         Behavior: Behavior normal.         Thought Content: Thought content  normal.         Judgment: Judgment normal.           Assessment and Plan:        Acute pulmonary embolism without acute cor pulmonale  -Patient with history of rheumatoid arthritis, history of breast cancer and recent hospitalization  -Bilateral lower extremity Doppler negative for DVT  - was started on heparin drip.  Switch to Xarelto today by pulmonology.  Plan to monitor for bleeding and check a CBC tomorrow.  -Wean her off of oxygen as tolerated.       Type 2 diabetes mellitus without retinopathy (HCC)  -SSI       Hypertension  -Continue home meds       Chronic obstructive pulmonary disease, unspecified (HCC)  -Continue inhalers-patient is supposed to be on hydroxychloroquine     Cloudy urine  -Patient reported that her urine was foul-smelling and cloudy and that usually this happens with her whenever she gets a UTI  -Urinalysis points towards an infection.  Started nitrofurantoin and wait for culture results to determine whether she should continue antibiotics or not        Rheumatoid arthritis (HCC)  -Daily prednisone and hydroxychloroquine     HLD  -Continue statin        GERD  -Continue pantoprazole      Results:     Lab Results   Component Value Date    WBC 13.2 (H) 02/25/2024    HGB 7.5 (L) 02/25/2024    HCT 24.8 (L) 02/25/2024    .0 02/25/2024    CREATSERUM 1.05 (H) 02/25/2024    BUN 21 02/25/2024     02/25/2024    K 4.5 02/25/2024    K 4.5 02/25/2024     02/25/2024    CO2 24.0 02/25/2024     (H) 02/25/2024    CA 9.2 02/25/2024    ALB 3.4 02/24/2024    ALKPHO 63 02/24/2024    BILT 0.2 02/24/2024    TP 6.4 02/24/2024    AST 18 02/24/2024    ALT <7 (L) 02/24/2024    .5 (H) 02/25/2024    INR 0.93 12/14/2023    TSH 0.622 10/20/2023    DDIMER 1.60 (H) 02/23/2024    ESRML 91 (H) 09/14/2023    CRP 1.10 (H) 09/14/2023    MG 1.0 (LL) 02/16/2024    B12 748 10/20/2023       US VENOUS DOPPLER LEG BILAT - DIAG IMG (CPT=93970)    Result Date: 2/23/2024  CONCLUSION: No evidence of acute  deep venous thrombosis in the imaged veins of the bilateral lower extremities.     elm-remote   Dictated by (CST): Awais Munson MD on 2/23/2024 at 8:40 PM     Finalized by (CST): Awais Munson MD on 2/23/2024 at 8:41 PM          CT CHEST PE AORTA (IV ONLY) (CPT=71260)    Result Date: 2/23/2024  CONCLUSION:   Acute pulmonary embolus extending from the right lower lobar pulmonary artery to to multiple segmental arteries.  Overall low clot burden.  No CT evidence of right heart strain.  Bilateral upper lobe fibrosis and cystic changes.  Suspected superimposed infection in the left upper lobe.  These findings were communicated with Dr. Alex Álvarez by Dr. Awais Munson at 1719 hours using telephone on 02/23/2024.      elm-remote   Dictated by (CST): Awais Munson MD on 2/23/2024 at 5:07 PM     Finalized by (CST): Awais Munson MD on 2/23/2024 at 5:19 PM                       Alejandra Palomino MD

## 2024-02-26 PROBLEM — D62 ABLA (ACUTE BLOOD LOSS ANEMIA): Status: ACTIVE | Noted: 2024-02-26

## 2024-02-26 PROBLEM — I26.99 OTHER PULMONARY EMBOLISM WITHOUT ACUTE COR PULMONALE (HCC): Status: ACTIVE | Noted: 2024-02-23

## 2024-02-26 PROBLEM — I26.99 PE (PULMONARY THROMBOEMBOLISM) (HCC): Status: ACTIVE | Noted: 2024-02-23

## 2024-02-26 LAB
ANTIBODY SCREEN: NEGATIVE
APTT PPP: 108.6 SECONDS (ref 23.3–35.6)
APTT PPP: 41.1 SECONDS (ref 23.3–35.6)
BASOPHILS # BLD AUTO: 0.05 X10(3) UL (ref 0–0.2)
BASOPHILS NFR BLD AUTO: 0.4 %
DEPRECATED RDW RBC AUTO: 56.4 FL (ref 35.1–46.3)
EOSINOPHIL # BLD AUTO: 0.27 X10(3) UL (ref 0–0.7)
EOSINOPHIL NFR BLD AUTO: 2.4 %
ERYTHROCYTE [DISTWIDTH] IN BLOOD BY AUTOMATED COUNT: 17.2 % (ref 11–15)
GLUCOSE BLDC GLUCOMTR-MCNC: 169 MG/DL (ref 70–99)
GLUCOSE BLDC GLUCOMTR-MCNC: 183 MG/DL (ref 70–99)
GLUCOSE BLDC GLUCOMTR-MCNC: 216 MG/DL (ref 70–99)
GLUCOSE BLDC GLUCOMTR-MCNC: 288 MG/DL (ref 70–99)
HCT VFR BLD AUTO: 23.1 %
HGB BLD-MCNC: 6.8 G/DL
HGB BLD-MCNC: 9.8 G/DL
IMM GRANULOCYTES # BLD AUTO: 0.16 X10(3) UL (ref 0–1)
IMM GRANULOCYTES NFR BLD: 1.4 %
LYMPHOCYTES # BLD AUTO: 1.74 X10(3) UL (ref 1–4)
LYMPHOCYTES NFR BLD AUTO: 15.3 %
MCH RBC QN AUTO: 27.8 PG (ref 26–34)
MCHC RBC AUTO-ENTMCNC: 29.4 G/DL (ref 31–37)
MCV RBC AUTO: 94.3 FL
MONOCYTES # BLD AUTO: 1 X10(3) UL (ref 0.1–1)
MONOCYTES NFR BLD AUTO: 8.8 %
NEUTROPHILS # BLD AUTO: 8.18 X10 (3) UL (ref 1.5–7.7)
NEUTROPHILS # BLD AUTO: 8.18 X10(3) UL (ref 1.5–7.7)
NEUTROPHILS NFR BLD AUTO: 71.7 %
PLATELET # BLD AUTO: 312 10(3)UL (ref 150–450)
RBC # BLD AUTO: 2.45 X10(6)UL
RH BLOOD TYPE: POSITIVE
WBC # BLD AUTO: 11.4 X10(3) UL (ref 4–11)

## 2024-02-26 PROCEDURE — 99223 1ST HOSP IP/OBS HIGH 75: CPT | Performed by: INTERNAL MEDICINE

## 2024-02-26 PROCEDURE — 99232 SBSQ HOSP IP/OBS MODERATE 35: CPT | Performed by: PHYSICIAN ASSISTANT

## 2024-02-26 PROCEDURE — 30233N1 TRANSFUSION OF NONAUTOLOGOUS RED BLOOD CELLS INTO PERIPHERAL VEIN, PERCUTANEOUS APPROACH: ICD-10-PCS | Performed by: INTERNAL MEDICINE

## 2024-02-26 PROCEDURE — 99232 SBSQ HOSP IP/OBS MODERATE 35: CPT | Performed by: INTERNAL MEDICINE

## 2024-02-26 RX ORDER — SODIUM CHLORIDE 9 MG/ML
INJECTION, SOLUTION INTRAVENOUS ONCE
Status: COMPLETED | OUTPATIENT
Start: 2024-02-26 | End: 2024-02-26

## 2024-02-26 RX ORDER — HEPARIN SODIUM AND DEXTROSE 10000; 5 [USP'U]/100ML; G/100ML
INJECTION INTRAVENOUS CONTINUOUS
Status: ACTIVE | OUTPATIENT
Start: 2024-02-26 | End: 2024-02-27

## 2024-02-26 RX ORDER — HEPARIN SODIUM AND DEXTROSE 10000; 5 [USP'U]/100ML; G/100ML
700 INJECTION INTRAVENOUS ONCE
Status: COMPLETED | OUTPATIENT
Start: 2024-02-26 | End: 2024-02-26

## 2024-02-26 NOTE — CONSULTS
[facility]   Gastroenterology Consultation Note    Chester Bautista  Patient Status:    Inpatient  Date of Admission:         2/23/2024, Hospital day #3  Attending:   Heike Sorto MD  PCP:     Heike Sorto MD    Reason for Consultation:  Acute on chronic anemia, history of AVMs     History of Present Illness:  Chester Bautista is a a(n) 84 year old female w/ a history of breast cancer s/p lumpectomy and radiation, rheumatoid arthritis, COPD, HTN, HLD, DM, who presented with shortness of breath found to have new PE started on XARELTO this admission. GI consulted for acute on chronic anemia. Hgb previously 10's earlier this month, now drifted down to 6.8 this morning. No overt gi bleeding such as melena or hematochezia. No abdominal pain, nausea or emesis. Stools are brown and formed. States she has history of GI bleed secondary to AVMs and she has never seen changes in stool character related to these bleeds.     EGD and colonoscopy 6/2023 with duodenal avm x 3, gastric avm x 1, and cecal avm x 1, each were treated. VCE 12/8/2023 with small bowel AVMs with one focal area of bleeding in the proximal duodenum. Push enteroscopy on 12/21 showed 4 small avms in the second portion of duodenum, all treated with APC.     History:  Past Medical History:   Diagnosis Date    Anxiety state     Cancer (HCC)     breast cancer 2018    Chronic obstructive pulmonary disease, unspecified (HCC) 05/04/2023    Chronic obstructive pulmonary disease, unspecified (HCC) 05/04/2023    COPD (chronic obstructive pulmonary disease) (HCC)     Diabetes (HCC)     Essential hypertension     Exposure to medical diagnostic radiation     High blood pressure     High cholesterol     History of blood transfusion 07/2022    low hemoglobin    Osteoarthritis     PONV (postoperative nausea and vomiting)     Pulmonary emphysema (HCC)     Rheumatoid arthritis (HCC)      Past Surgical History:   Procedure Laterality  Date    CATARACT EXTRACTION W/  INTRAOCULAR LENS IMPLANT Bilateral 2017    Dr in [facility]     COLONOSCOPY      COLONOSCOPY N/A 07/21/2022    Procedure: COLONOSCOPY;  Surgeon: Mikey Garcia MD;  Location: [facility] ENDOSCOPY    COLONOSCOPY N/A 06/15/2023    Procedure: COLONOSCOPY;  Surgeon: Mikey Garcia MD;  Location: [facility] ENDOSCOPY    CORRECT BUNION,SIMPLE      right foot  1993    EGD  12/21/2023    Dr. Garcia; Duodenal AVM's x4 cauterized    HYSTERECTOMY      1972, no abnormal Paps, due to heavy bleeding with fibroids.  Not sure if it had bilateral salpingo-oophorectomy.    IR ANEURYSM REPAIRM  2010    Computer assisted volumetric craniofomy with clipping of anterior cerebral artery.    LUMPECTOMY RIGHT      RADIATION RIGHT      ROTATOR CUFF REPAIR      1993    TOTAL KNEE REPLACEMENT Right 2010    TOTAL KNEE REPLACEMENT Left 07/02/2015    TRANSORAL INCISIONLESS FUNDOPLICATION - INTERNAL  01/25/2012 Fredy fundoplication at [facility] Dr. Fournier.    Fredy fundoplication at [facility] Dr. Fournier.    TRIGGER FINGER RELEASE      left hand  2005    YAG CAPSULOTOMY - OU - BOTH EYES Bilateral 09/2021    done in [place]     Family History   Problem Relation Age of Onset    Heart Surgery Mother         Leaky valve at 39 y/o.     Prostate Cancer Brother     Cancer Brother     Diabetes Maternal Grandmother     Diabetes Paternal Aunt     Breast Cancer Self 78    Breast Cancer Niece         before 50    Macular degeneration Neg     Glaucoma Neg       reports that she quit smoking about 27 years ago. Her smoking use included cigarettes. She smoked an average of 0.3 packs per day. She has never used smokeless tobacco. She reports that she does not drink alcohol and does not use drugs.    Allergies:  Allergies   Allergen Reactions    Celebrex [Celecoxib] PALPITATIONS    Penicillins ITCHING and SWELLING    Metformin And Related UNKNOWN    Olmesartan UNKNOWN       Medications:    Current  Facility-Administered Medications:     [Held by provider] rivaroxaban (Xarelto) tab 15 mg, 15 mg, Oral, BID with meals    traMADol (Ultram) tab 50 mg, 50 mg, Oral, Q12H PRN    glucose (Dex4) 15 GM/59ML oral liquid 15 g, 15 g, Oral, Q15 Min PRN **OR** glucose (Glutose) 40% oral gel 15 g, 15 g, Oral, Q15 Min PRN **OR** glucose-vitamin C (Dex-4) chewable tab 4 tablet, 4 tablet, Oral, Q15 Min PRN **OR** dextrose 50% injection 50 mL, 50 mL, Intravenous, Q15 Min PRN **OR** glucose (Dex4) 15 GM/59ML oral liquid 30 g, 30 g, Oral, Q15 Min PRN **OR** glucose (Glutose) 40% oral gel 30 g, 30 g, Oral, Q15 Min PRN **OR** glucose-vitamin C (Dex-4) chewable tab 8 tablet, 8 tablet, Oral, Q15 Min PRN    insulin aspart (NovoLOG) 100 Units/mL FlexPen 1-7 Units, 1-7 Units, Subcutaneous, TID CC    cyclobenzaprine (Flexeril) tab 10 mg, 10 mg, Oral, Nightly    nitrofurantoin monohydrate macro (Macrobid) cap 100 mg, 100 mg, Oral, BID    acetaminophen (Tylenol Extra Strength) tab 500 mg, 500 mg, Oral, Q6H PRN    albuterol (Ventolin HFA) 108 (90 Base) MCG/ACT inhaler 2 puff, 2 puff, Inhalation, Q4H PRN    carvedilol (Coreg) tab 25 mg, 25 mg, Oral, BID with meals    fluticasone furoate-vilanterol (Breo Ellipta) 100-25 MCG/ACT inhaler 1 puff, 1 puff, Inhalation, Daily    glycopyrrolate (Robinul) tab 1 mg, 1 mg, Oral, TID    hydroxychloroquine (Plaquenil) tab 200 mg, 200 mg, Oral, Daily    cetirizine (ZyrTEC) tab 5 mg, 5 mg, Oral, Daily    montelukast (Singulair) tab 10 mg, 10 mg, Oral, Nightly    NIFEdipine ER (Procardia-XL) 24 hr tab 60 mg, 60 mg, Oral, Daily    pantoprazole (Protonix) DR tab 20 mg, 20 mg, Oral, QAM AC    potassium chloride (K-Dur) tab 20 mEq, 20 mEq, Oral, BID    predniSONE (Deltasone) tab 5 mg, 5 mg, Oral, Daily    rosuvastatin (Crestor) tab 10 mg, 10 mg, Oral, Nightly    spironolactone (Aldactone) tab 50 mg, 50 mg, Oral, Daily    torsemide (Demadex) tab 20 mg, 20 mg, Oral, Daily    losartan (Cozaar) tab 50 mg, 50 mg, Oral,  Daily    melatonin tab 3 mg, 3 mg, Oral, Nightly PRN    polyethylene glycol (PEG 3350) (Miralax) 17 g oral packet 17 g, 17 g, Oral, Daily PRN    sennosides (Senokot) tab 17.2 mg, 17.2 mg, Oral, Nightly PRN    bisacodyl (Dulcolax) 10 MG rectal suppository 10 mg, 10 mg, Rectal, Daily PRN    ondansetron (Zofran) 4 MG/2ML injection 4 mg, 4 mg, Intravenous, Q6H PRN    metoclopramide (Reglan) 5 mg/mL injection 5 mg, 5 mg, Intravenous, Q8H PRN    benzonatate (Tessalon) cap 200 mg, 200 mg, Oral, TID PRN    guaiFENesin (Robitussin) 100 MG/5 ML oral liquid 200 mg, 200 mg, Oral, Q4H PRN    glycerin-hypromellose- (Artifical Tears) 0.2-0.2-1 % ophthalmic solution 1 drop, 1 drop, Both Eyes, QID PRN    sodium chloride (Saline Mist) 0.65 % nasal solution 1 spray, 1 spray, Each Nare, Q3H PRN  Medications Prior to Admission   Medication Sig Dispense Refill Last Dose    LORATADINE 10 MG Oral Tab TAKE 1 TABLET BY MOUTH EVERY DAY 90 tablet 1 2/23/2024 at 0800    glipiZIDE 5 MG Oral Tab Take 1.5 tablets (7.5 mg total) by mouth daily with breakfast.   2/23/2024 at 0800    Accu-Chek FastClix Lancets Does not apply Misc Check sugar once daily as directed (E11.42) 100 each 2 Past Week    hydroxychloroquine 200 MG Oral Tab Take 1 tablet (200 mg total) by mouth daily. 90 tablet 1 2/22/2024 at 2200    NIFEdipine ER 60 MG Oral Tablet 24 Hr Take 1 tablet (60 mg total) by mouth daily. 30 tablet 1 2/23/2024 at 0800    Glucose Blood (ACCU-CHEK GUIDE) In Vitro Strip Check blood sugar twice a day (fasting and before dinner). DX: E11.42, NIDDM 200 strip 1 2/23/2024    rosuvastatin (CRESTOR) 20 MG Oral Tab Take 1 tablet (20 mg total) by mouth nightly. 90 tablet 0 2/22/2024 at 2200    omeprazole 20 MG Oral Capsule Delayed Release Take 1 capsule (20 mg total) by mouth every morning. 90 capsule 1 2/23/2024 at 0800    cyclobenzaprine 10 MG Oral Tab Take 1 tablet (10 mg total) by mouth nightly as needed for Muscle spasms. 90 tablet 1 Past Week     glycopyrrolate 1 MG Oral Tab Take 1 tablet (1 mg total) by mouth 3 (three) times daily. 90 tablet 1 2/23/2024 at 0800    albuterol 108 (90 Base) MCG/ACT Inhalation Aero Soln inhale 2 puff by inhalation route  every 4 - 6 hours as needed 1 each 0 2/23/2024 at 0900    predniSONE 5 MG Oral Tab Take 1 tablet (5 mg total) by mouth daily. 90 tablet 1 2/23/2024 at 0800    montelukast 10 MG Oral Tab Take 1 tablet (10 mg total) by mouth nightly. 90 tablet 3 2/22/2024 at 2200    carvedilol 25 MG Oral Tab Take 1 tablet (25 mg total) by mouth 2 (two) times daily with meals. 180 tablet 3 2/23/2024 at 0800    valsartan 80 MG Oral Tab Take 1 tablet (80 mg total) by mouth daily. 90 tablet 3 2/23/2024 at 0800    spironolactone 25 MG Oral Tab Take 2 tablets (50 mg total) by mouth daily. 180 tablet 1 2/23/2024 at 0800    fluticasone-salmeterol (WIXELA INHUB) 250-50 MCG/ACT Inhalation Aerosol Powder, Breath Activated Inhale 1 puff into the lungs every 12 (twelve) hours. 1 each 5 Past Week    albuterol 108 (90 Base) MCG/ACT Inhalation Aero Soln inhale 2 puff by inhalation route  every 4 - 6 hours as needed 1 each 5 2/23/2024 at 0900    Blood Glucose Monitoring Suppl (ONETOUCH VERIO) w/Device Does not apply Kit 1 each daily. Test 1x daily 1 kit 0 Past Week    OneTouch Delica Lancets 33G Does not apply Misc 4 x daily 100 each 1 Past Week    Blood Glucose Monitoring Suppl (BLOOD GLUCOSE SYSTEM GILLES) Does not apply Kit DX E 11.9.  Checks every morning. 1 kit 0 Past Week    Lancets Does not apply Misc DX E 11.9.  Checks every morning. 50 each 11 Past Week    Blood Glucose Monitoring Suppl w/Device Does not apply Kit DX E 11.9.  Checks every morning. 1 kit 0 Past Week    Blood Gluc Meter Disp-Strips Does not apply Device DX E 11.9.  Checks every morning. 100 each 11 Past Week    acetaminophen 500 MG Oral Tab Take 1 tablet (500 mg total) by mouth every 6 (six) hours as needed for Pain.   2/23/2024 at 0900    aspirin 81 MG Oral Tab EC Take 325  mg by mouth daily.   2/23/2024 at 0900    Glucose Blood (ONETOUCH VERIO) In Vitro Strip Test 1x daily 100 strip 1        Review of Systems:  CONSTITUTIONAL:  negative for fevers, rigors  EYES:  negative for diplopia   RESPIRATORY:  negative for severe shortness of breath  CARDIOVASCULAR:  negative for crushing sub-sternal chest pain  GASTROINTESTINAL:  see HPI  GENITOURINARY:  negative for dysuria or gross hematuria  INTEGUMENT/BREAST:  SKIN:  negative for jaundice   ALLERGIC/IMMUNOLOGIC:  negative for hay fever  ENDOCRINE:  negative for cold intolerance and heat intolerance  MUSCULOSKELETAL:  negative for joint effusion/severe erythema  NEURO: negative for dizziness or loss of conscious or paresthesias  BEHAVIOR/PSYCH:  negative for psychotic behavior    Physical Exam:    Blood pressure 129/60, pulse 85, temperature 98.3 °F (36.8 °C), temperature source Oral, resp. rate 20, weight 129 lb 12.8 oz (58.9 kg), SpO2 91%. Body mass index is 24.53 kg/m².    General: awake, alert and oriented, no acute distress  HEENT: moist mucus membranes  PULM: no conversational dyspnea  CARDIOVASCULAR: regular rate and rhythm, the extremities are warm and well perfused  GI: soft, non-tender, non-distended, + BS, no rebound/guarding   EXTREMITIES: no edema, moving all extremities  SKIN: no visible rash  NEURO: appropriate and interactive    Laboratory Data:  Lab Results   Component Value Date    WBC 11.4 02/26/2024    HGB 6.8 02/26/2024    HCT 23.1 02/26/2024    .0 02/26/2024       Imaging:  US VENOUS DOPPLER LEG BILAT - DIAG IMG (CPT=93970)    Result Date: 2/23/2024  CONCLUSION: No evidence of acute deep venous thrombosis in the imaged veins of the bilateral lower extremities.     elm-remote   Dictated by (CST): Awais Munson MD on 2/23/2024 at 8:40 PM     Finalized by (CST): Awais Munson MD on 2/23/2024 at 8:41 PM          CT CHEST PE AORTA (IV ONLY) (CPT=71260)    Result Date: 2/23/2024  CONCLUSION:   Acute pulmonary embolus  extending from the right lower lobar pulmonary artery to to multiple segmental arteries.  Overall low clot burden.  No CT evidence of right heart strain.  Bilateral upper lobe fibrosis and cystic changes.  Suspected superimposed infection in the left upper lobe.  These findings were communicated with Dr. Alex Álvarez by Dr. Awais Munson at 1719 hours using telephone on 02/23/2024.      elm-[facility]   Dictated by (CST): Awais Munson MD on 2/23/2024 at 5:07 PM     Finalized by (CST): Awais Munson MD on 2/23/2024 at 5:19 PM          XR CHEST AP PORTABLE  (CPT=71045)    Result Date: 2/23/2024  PROCEDURE: XR CHEST AP PORTABLE  (CPT=71045) TIME: 1331  COMPARISON: [facility], XR CHEST AP PORTABLE (CPT=71045), 2/03/2024, 2:28 PM.  INDICATIONS: Shortness of breath for one week.  TECHNIQUE:   Single view.   Findings and impression:  Normal heart size.  No edema  The distal 1.5 centimeter portion of the renea catheter remains malposition in the azygos vein   Normal lung volumes  Decreased mild ill-defined bilateral opacities involving the mid to lower lungs  No new pulmonary opacity  No pneumothorax or pleural effusion   Dictated by (CST): Alec Davis MD on 2/23/2024 at 1:46 PM     Finalized by (CST): Alec Davis MD on 2/23/2024 at 1:47 PM           Assessment & Plan   Chester Bautista is a a(n) 84 year old female w/ a history of breast cancer s/p lumpectomy and radiation, rheumatoid arthritis, COPD, HTN, HLD, DM, who presented with shortness of breath found to have new PE started on XARELTO this admission. GI consulted for acute on chronic anemia. Hgb previously 10's earlier this month, now drifted down to 6.8 this morning.  EGD and colonoscopy 6/2023 with duodenal avm x 3, gastric avm x 1, and cecal avm x 1, each were treated. VCE 12/8/2023 with small bowel AVMs with one focal area of bleeding in the proximal duodenum. Push enteroscopy on 12/21 showed 4 small avms in the second portion of duodenum, all  treated with APC.       Probable recurrent AVM bleed exacerbated by recent initiation of therapeutic anticoagulation     Recommend:  - transfuse to goal hgb >7  - plan egd with push enteroscopy tomorrow  - give venofer  - hold xarelto if able, can give short acting agent, prior to procedure  - discussed the nature of AVM disease and risk of recurrent bleeds and chronic anemia    Zoila Mcqueen MD  [facility]  2/26/2024    This note was partially prepared using Dragon Medical voice recognition dictation software. As a result, errors may occur. When identified, these errors have been corrected. While every attempt is made to correct errors during dictation, discrepancies may still exist.

## 2024-02-26 NOTE — PROGRESS NOTES
[facility]    Progress Note    Chester Bautista Patient Status:  Inpatient    1940 MRN [id number]   Location [facility]5W Attending Heike Sorto MD   Hosp Day # 3 PCP Heike Sorto MD     Chief Complaint: Okay.  She denies any blood in the stool    Subjective:     Constitutional: Negative.    HENT: Negative.     Eyes: Negative.    Respiratory: Negative.     Cardiovascular: Negative.    Gastrointestinal: Negative.    Genitourinary: Negative.    Musculoskeletal: Negative.    Skin: Negative.    Hematological: Negative.    Psychiatric/Behavioral: Negative.         Objective:   Blood pressure 112/56, pulse 82, temperature 97.3 °F (36.3 °C), temperature source Oral, resp. rate 17, weight 129 lb 12.8 oz (58.9 kg), SpO2 96%.  Physical Exam  Vitals and nursing note reviewed.   Constitutional:       Appearance: Normal appearance.   HENT:      Head: Normocephalic and atraumatic.   Cardiovascular:      Rate and Rhythm: Normal rate and regular rhythm.      Pulses: Normal pulses.      Heart sounds: Normal heart sounds.   Pulmonary:      Effort: Pulmonary effort is normal.      Breath sounds: Normal breath sounds.   Abdominal:      General: Bowel sounds are normal.      Palpations: Abdomen is soft.   Musculoskeletal:         General: Normal range of motion.      Cervical back: Normal range of motion and neck supple.   Skin:     General: Skin is warm and dry.   Neurological:      Mental Status: She is alert. Mental status is at baseline.   Psychiatric:         Mood and Affect: Mood normal.         Results:   Lab Results   Component Value Date    WBC 11.4 (H) 2024    HGB 6.8 (LL) 2024    HCT 23.1 (L) 2024    .0 2024    CREATSERUM 1.05 (H) 2024    BUN 21 2024     2024    K 4.5 2024    K 4.5 2024     2024    CO2 24.0 2024     (H) 2024    CA 9.2 2024    ALB 3.4 2024    ALKPHO  63 02/24/2024    BILT 0.2 02/24/2024    TP 6.4 02/24/2024    AST 18 02/24/2024    ALT <7 (L) 02/24/2024    .5 (H) 02/25/2024    INR 0.93 12/14/2023    TSH 0.622 10/20/2023    DDIMER 1.60 (H) 02/23/2024    ESRML 91 (H) 09/14/2023    CRP 1.10 (H) 09/14/2023    MG 1.0 (LL) 02/16/2024    TROPHS 9 02/23/2024    B12 748 10/20/2023       No results found.        Assessment & Plan:       Acute pulmonary embolism without acute cor pulmonale  -Bilateral lower extremity Doppler negative for DVT  .  Hemoglobin dropped status post 1 units of PRBCs she has previous history of AVM, will hold anticoagulation, GI consult called           Type 2 diabetes mellitus without retinopathy (HCC)  With sliding scale insulin,   hypoglycemia protocol    Hypertension  -Continue home meds       Chronic obstructive pulmonary disease, unspecified (HCC)  -Continue inhalers-patient is supposed to be on hydroxychloroquine     Cloudy urine  UA shows Klebsiella, pending official culture and sensitivity continue with current antibiotics for now     Rheumatoid arthritis (HCC)  -Daily prednisone and hydroxychloroquine     HLD  -Continue statin        GERD  -Continue pantoprazole          VARINDER LATHAM MD  2/26/2024

## 2024-02-26 NOTE — PLAN OF CARE
Problem: Patient Centered Care  Goal: Patient preferences are identified and integrated in the patient's plan of care  Description: Interventions:  - What would you like us to know as we care for you? From home with daughter  - Provide timely, complete, and accurate information to patient/family  - Incorporate patient and family knowledge, values, beliefs, and cultural backgrounds into the planning and delivery of care  - Encourage patient/family to participate in care and decision-making at the level they choose  - Honor patient and family perspectives and choices  Outcome: Progressing     Problem: CARDIOVASCULAR - ADULT  Goal: Maintains optimal cardiac output and hemodynamic stability  Description: INTERVENTIONS:  - Monitor vital signs, rhythm, and trends  - Monitor for bleeding, hypotension and signs of decreased cardiac output  - Evaluate effectiveness of vasoactive medications to optimize hemodynamic stability  - Monitor arterial and/or venous puncture sites for bleeding and/or hematoma  - Assess quality of pulses, skin color and temperature  - Assess for signs of decreased coronary artery perfusion - ex. Angina  - Evaluate fluid balance, assess for edema, trend weights  Outcome: Progressing     Problem: RESPIRATORY - ADULT  Goal: Achieves optimal ventilation and oxygenation  Description: INTERVENTIONS:  - Assess for changes in respiratory status  - Assess for changes in mentation and behavior  - Position to facilitate oxygenation and minimize respiratory effort  - Oxygen supplementation based on oxygen saturation or ABGs  - Provide Smoking Cessation handout, if applicable  - Encourage broncho-pulmonary hygiene including cough, deep breathe, Incentive Spirometry  - Assess the need for suctioning and perform as needed  - Assess and instruct to report SOB or any respiratory difficulty  - Respiratory Therapy support as indicated  - Manage/alleviate anxiety  - Monitor for signs/symptoms of CO2 retention  Outcome:  Progressing     Problem: PAIN - ADULT  Goal: Verbalizes/displays adequate comfort level or patient's stated pain goal  Description: INTERVENTIONS:  - Encourage pt to monitor pain and request assistance  - Assess pain using appropriate pain scale  - Administer analgesics based on type and severity of pain and evaluate response  - Implement non-pharmacological measures as appropriate and evaluate response  - Consider cultural and social influences on pain and pain management  - Manage/alleviate anxiety  - Utilize distraction and/or relaxation techniques  - Monitor for opioid side effects  - Notify MD/LIP if interventions unsuccessful or patient reports new pain  - Anticipate increased pain with activity and pre-medicate as appropriate  Outcome: Progressing     Problem: RISK FOR INFECTION - ADULT  Goal: Absence of fever/infection during anticipated neutropenic period  Description: INTERVENTIONS  - Monitor WBC  - Administer growth factors as ordered  - Implement neutropenic guidelines  Outcome: Progressing     Problem: SAFETY ADULT - FALL  Goal: Free from fall injury  Description: INTERVENTIONS:  - Assess pt frequently for physical needs  - Identify cognitive and physical deficits and behaviors that affect risk of falls.  - [place] fall precautions as indicated by assessment.  - Educate pt/family on patient safety including physical limitations  - Instruct pt to call for assistance with activity based on assessment  - Modify environment to reduce risk of injury  - Provide assistive devices as appropriate  - Consider OT/PT consult to assist with strengthening/mobility  - Encourage toileting schedule  Outcome: Progressing     Problem: Patient/Family Goals  Goal: Patient/Family Long Term Goal  Description: Patient's Long Term Goal: To discharge from hospital     Interventions:  -  Monitor vital signs   - Monitor appropriate labs   - Monitor blood glucose levels   - Pain management   - Administer medications per order   -  Follow MD orders   - Diagnostics per order   - Update/inform patient and family on plan of care   - Discharge planning    - See additional Care Plan goals for specific interventions  Outcome: Progressing  Goal: Patient/Family Short Term Goal  Description: Patient's Short Term Goal: To improve shortness of breath     Interventions:   - Monitor vital signs   - Monitor appropriate labs   - Monitor blood glucose levels   - Pain management   - Administer medications per order   - Follow MD orders   - Diagnostics per order   - Update/inform patient and family on plan of care   - See additional Care Plan goals for specific interventions  Outcome: Progressing     Problem: DISCHARGE PLANNING  Goal: Discharge to home or other facility with appropriate resources  Description: INTERVENTIONS:  - Identify barriers to discharge w/pt and caregiver  - Include patient/family/discharge partner in discharge planning  - Arrange for needed discharge resources and transportation as appropriate  - Identify discharge learning needs (meds, wound care, etc)  - Arrange for interpreters to assist at discharge as needed  - Consider post-discharge preferences of patient/family/discharge partner  - Complete POLST form as appropriate  - Assess patient's ability to be responsible for managing their own health  - Refer to Case Management Department for coordinating discharge planning if the patient needs post-hospital services based on physician/LIP order or complex needs related to functional status, cognitive ability or social support system  Outcome: Progressing     Monitoring vital signs, stable at this time. Monitoring blood glucose levels. Fall precautions in place- bed alarm on, bed locked in lowest position, call light within reach. Frequent rounding by nursing staff.

## 2024-02-26 NOTE — CDS QUERY
Clinical Significance - Lab Results Associated with Blood Loss  CLINICAL DOCUMENTATION CLARIFICATION FORM  Dear Doctor Sandra:  Clinical information (provided below) indicates blood loss and abnormal blood counts.   PLEASE (X) ALL DIAGNOSES THAT APPLY.  SELECTION BY PROVIDER ONLY  (  x)  Acute Blood Loss Anemia  (  ) Post Op Blood loss Anemia  (  )  Other (please specify):         Clinical Indicators:  Laboratory results include:  Date:2/25 Hgb 7.5/ Hct 24.8  Date:2/26 Hgb 6.8/ Hct 23.1  2/26 Pulmonology consult: dyspnea on exertion/ fatigue  2/26 Pulmonology consult: Acute on chronic anemia    Risk Factors:  Recent hospitalization  Home medication: Xarelto  GI Bleeding/ Hx of GI Bleeding with AVMs    Treatments:  Monitor daily labs- Hgb/ Hct  Xarelto on hold  Transfue 1 unit PRBC       If you have any questions, please contact Clinical : Araceli Velasco RN CDS  at [email or web address]    Thank You!    THIS FORM IS A PERMANENT PART OF THE MEDICAL RECORD

## 2024-02-26 NOTE — H&P (VIEW-ONLY)
[facility]   Gastroenterology Consultation Note    Chester Bautitsa  Patient Status:    Inpatient  Date of Admission:         2/23/2024, Hospital day #3  Attending:   Heike Sorto MD  PCP:     Heike Sorto MD    Reason for Consultation:  Acute on chronic anemia, history of AVMs     History of Present Illness:  Chester Bautista is a a(n) 84 year old female w/ a history of breast cancer s/p lumpectomy and radiation, rheumatoid arthritis, COPD, HTN, HLD, DM, who presented with shortness of breath found to have new PE started on XARELTO this admission. GI consulted for acute on chronic anemia. Hgb previously 10's earlier this month, now drifted down to 6.8 this morning. No overt gi bleeding such as melena or hematochezia. No abdominal pain, nausea or emesis. Stools are brown and formed. States she has history of GI bleed secondary to AVMs and she has never seen changes in stool character related to these bleeds.     EGD and colonoscopy 6/2023 with duodenal avm x 3, gastric avm x 1, and cecal avm x 1, each were treated. VCE 12/8/2023 with small bowel AVMs with one focal area of bleeding in the proximal duodenum. Push enteroscopy on 12/21 showed 4 small avms in the second portion of duodenum, all treated with APC.     History:  Past Medical History:   Diagnosis Date    Anxiety state     Cancer (HCC)     breast cancer 2018    Chronic obstructive pulmonary disease, unspecified (HCC) 05/04/2023    Chronic obstructive pulmonary disease, unspecified (HCC) 05/04/2023    COPD (chronic obstructive pulmonary disease) (HCC)     Diabetes (HCC)     Essential hypertension     Exposure to medical diagnostic radiation     High blood pressure     High cholesterol     History of blood transfusion 07/2022    low hemoglobin    Osteoarthritis     PONV (postoperative nausea and vomiting)     Pulmonary emphysema (HCC)     Rheumatoid arthritis (HCC)      Past Surgical History:   Procedure Laterality  Date    CATARACT EXTRACTION W/  INTRAOCULAR LENS IMPLANT Bilateral 2017    Dr in [facility]     COLONOSCOPY      COLONOSCOPY N/A 07/21/2022    Procedure: COLONOSCOPY;  Surgeon: Mikey Garcia MD;  Location: [facility]    COLONOSCOPY N/A 06/15/2023    Procedure: COLONOSCOPY;  Surgeon: Mikey Garcia MD;  Location: [facility] ENDOSCOPY    CORRECT BUNION,SIMPLE      right foot  1993    EGD  12/21/2023    Dr. Garcia; Duodenal AVM's x4 cauterized    HYSTERECTOMY      1972, no abnormal Paps, due to heavy bleeding with fibroids.  Not sure if it had bilateral salpingo-oophorectomy.    IR ANEURYSM REPAIRM  2010    Computer assisted volumetric craniofomy with clipping of anterior cerebral artery.    LUMPECTOMY RIGHT      RADIATION RIGHT      ROTATOR CUFF REPAIR      1993    TOTAL KNEE REPLACEMENT Right 2010    TOTAL KNEE REPLACEMENT Left 07/02/2015    TRANSORAL INCISIONLESS FUNDOPLICATION - INTERNAL  01/25/2012 Fredy fundoplication at [facility] Dr. Fournier.    Fredy fundoplication at [facility] Dr. Fournier.    TRIGGER FINGER RELEASE      left hand  2005    YAG CAPSULOTOMY - OU - BOTH EYES Bilateral 09/2021    done in [place]     Family History   Problem Relation Age of Onset    Heart Surgery Mother         Leaky valve at 41 y/o.     Prostate Cancer Brother     Cancer Brother     Diabetes Maternal Grandmother     Diabetes Paternal Aunt     Breast Cancer Self 78    Breast Cancer Niece         before 50    Macular degeneration Neg     Glaucoma Neg       reports that she quit smoking about 27 years ago. Her smoking use included cigarettes. She smoked an average of 0.3 packs per day. She has never used smokeless tobacco. She reports that she does not drink alcohol and does not use drugs.    Allergies:  Allergies   Allergen Reactions    Celebrex [Celecoxib] PALPITATIONS    Penicillins ITCHING and SWELLING    Metformin And Related UNKNOWN    Olmesartan UNKNOWN       Medications:    Current  Facility-Administered Medications:     [Held by provider] rivaroxaban (Xarelto) tab 15 mg, 15 mg, Oral, BID with meals    traMADol (Ultram) tab 50 mg, 50 mg, Oral, Q12H PRN    glucose (Dex4) 15 GM/59ML oral liquid 15 g, 15 g, Oral, Q15 Min PRN **OR** glucose (Glutose) 40% oral gel 15 g, 15 g, Oral, Q15 Min PRN **OR** glucose-vitamin C (Dex-4) chewable tab 4 tablet, 4 tablet, Oral, Q15 Min PRN **OR** dextrose 50% injection 50 mL, 50 mL, Intravenous, Q15 Min PRN **OR** glucose (Dex4) 15 GM/59ML oral liquid 30 g, 30 g, Oral, Q15 Min PRN **OR** glucose (Glutose) 40% oral gel 30 g, 30 g, Oral, Q15 Min PRN **OR** glucose-vitamin C (Dex-4) chewable tab 8 tablet, 8 tablet, Oral, Q15 Min PRN    insulin aspart (NovoLOG) 100 Units/mL FlexPen 1-7 Units, 1-7 Units, Subcutaneous, TID CC    cyclobenzaprine (Flexeril) tab 10 mg, 10 mg, Oral, Nightly    nitrofurantoin monohydrate macro (Macrobid) cap 100 mg, 100 mg, Oral, BID    acetaminophen (Tylenol Extra Strength) tab 500 mg, 500 mg, Oral, Q6H PRN    albuterol (Ventolin HFA) 108 (90 Base) MCG/ACT inhaler 2 puff, 2 puff, Inhalation, Q4H PRN    carvedilol (Coreg) tab 25 mg, 25 mg, Oral, BID with meals    fluticasone furoate-vilanterol (Breo Ellipta) 100-25 MCG/ACT inhaler 1 puff, 1 puff, Inhalation, Daily    glycopyrrolate (Robinul) tab 1 mg, 1 mg, Oral, TID    hydroxychloroquine (Plaquenil) tab 200 mg, 200 mg, Oral, Daily    cetirizine (ZyrTEC) tab 5 mg, 5 mg, Oral, Daily    montelukast (Singulair) tab 10 mg, 10 mg, Oral, Nightly    NIFEdipine ER (Procardia-XL) 24 hr tab 60 mg, 60 mg, Oral, Daily    pantoprazole (Protonix) DR tab 20 mg, 20 mg, Oral, QAM AC    potassium chloride (K-Dur) tab 20 mEq, 20 mEq, Oral, BID    predniSONE (Deltasone) tab 5 mg, 5 mg, Oral, Daily    rosuvastatin (Crestor) tab 10 mg, 10 mg, Oral, Nightly    spironolactone (Aldactone) tab 50 mg, 50 mg, Oral, Daily    torsemide (Demadex) tab 20 mg, 20 mg, Oral, Daily    losartan (Cozaar) tab 50 mg, 50 mg, Oral,  Daily    melatonin tab 3 mg, 3 mg, Oral, Nightly PRN    polyethylene glycol (PEG 3350) (Miralax) 17 g oral packet 17 g, 17 g, Oral, Daily PRN    sennosides (Senokot) tab 17.2 mg, 17.2 mg, Oral, Nightly PRN    bisacodyl (Dulcolax) 10 MG rectal suppository 10 mg, 10 mg, Rectal, Daily PRN    ondansetron (Zofran) 4 MG/2ML injection 4 mg, 4 mg, Intravenous, Q6H PRN    metoclopramide (Reglan) 5 mg/mL injection 5 mg, 5 mg, Intravenous, Q8H PRN    benzonatate (Tessalon) cap 200 mg, 200 mg, Oral, TID PRN    guaiFENesin (Robitussin) 100 MG/5 ML oral liquid 200 mg, 200 mg, Oral, Q4H PRN    glycerin-hypromellose- (Artifical Tears) 0.2-0.2-1 % ophthalmic solution 1 drop, 1 drop, Both Eyes, QID PRN    sodium chloride (Saline Mist) 0.65 % nasal solution 1 spray, 1 spray, Each Nare, Q3H PRN  Medications Prior to Admission   Medication Sig Dispense Refill Last Dose    LORATADINE 10 MG Oral Tab TAKE 1 TABLET BY MOUTH EVERY DAY 90 tablet 1 2/23/2024 at 0800    glipiZIDE 5 MG Oral Tab Take 1.5 tablets (7.5 mg total) by mouth daily with breakfast.   2/23/2024 at 0800    Accu-Chek FastClix Lancets Does not apply Misc Check sugar once daily as directed (E11.42) 100 each 2 Past Week    hydroxychloroquine 200 MG Oral Tab Take 1 tablet (200 mg total) by mouth daily. 90 tablet 1 2/22/2024 at 2200    NIFEdipine ER 60 MG Oral Tablet 24 Hr Take 1 tablet (60 mg total) by mouth daily. 30 tablet 1 2/23/2024 at 0800    Glucose Blood (ACCU-CHEK GUIDE) In Vitro Strip Check blood sugar twice a day (fasting and before dinner). DX: E11.42, NIDDM 200 strip 1 2/23/2024    rosuvastatin (CRESTOR) 20 MG Oral Tab Take 1 tablet (20 mg total) by mouth nightly. 90 tablet 0 2/22/2024 at 2200    omeprazole 20 MG Oral Capsule Delayed Release Take 1 capsule (20 mg total) by mouth every morning. 90 capsule 1 2/23/2024 at 0800    cyclobenzaprine 10 MG Oral Tab Take 1 tablet (10 mg total) by mouth nightly as needed for Muscle spasms. 90 tablet 1 Past Week     glycopyrrolate 1 MG Oral Tab Take 1 tablet (1 mg total) by mouth 3 (three) times daily. 90 tablet 1 2/23/2024 at 0800    albuterol 108 (90 Base) MCG/ACT Inhalation Aero Soln inhale 2 puff by inhalation route  every 4 - 6 hours as needed 1 each 0 2/23/2024 at 0900    predniSONE 5 MG Oral Tab Take 1 tablet (5 mg total) by mouth daily. 90 tablet 1 2/23/2024 at 0800    montelukast 10 MG Oral Tab Take 1 tablet (10 mg total) by mouth nightly. 90 tablet 3 2/22/2024 at 2200    carvedilol 25 MG Oral Tab Take 1 tablet (25 mg total) by mouth 2 (two) times daily with meals. 180 tablet 3 2/23/2024 at 0800    valsartan 80 MG Oral Tab Take 1 tablet (80 mg total) by mouth daily. 90 tablet 3 2/23/2024 at 0800    spironolactone 25 MG Oral Tab Take 2 tablets (50 mg total) by mouth daily. 180 tablet 1 2/23/2024 at 0800    fluticasone-salmeterol (WIXELA INHUB) 250-50 MCG/ACT Inhalation Aerosol Powder, Breath Activated Inhale 1 puff into the lungs every 12 (twelve) hours. 1 each 5 Past Week    albuterol 108 (90 Base) MCG/ACT Inhalation Aero Soln inhale 2 puff by inhalation route  every 4 - 6 hours as needed 1 each 5 2/23/2024 at 0900    Blood Glucose Monitoring Suppl (ONETOUCH VERIO) w/Device Does not apply Kit 1 each daily. Test 1x daily 1 kit 0 Past Week    OneTouch Delica Lancets 33G Does not apply Misc 4 x daily 100 each 1 Past Week    Blood Glucose Monitoring Suppl (BLOOD GLUCOSE SYSTEM GILLES) Does not apply Kit DX E 11.9.  Checks every morning. 1 kit 0 Past Week    Lancets Does not apply Misc DX E 11.9.  Checks every morning. 50 each 11 Past Week    Blood Glucose Monitoring Suppl w/Device Does not apply Kit DX E 11.9.  Checks every morning. 1 kit 0 Past Week    Blood Gluc Meter Disp-Strips Does not apply Device DX E 11.9.  Checks every morning. 100 each 11 Past Week    acetaminophen 500 MG Oral Tab Take 1 tablet (500 mg total) by mouth every 6 (six) hours as needed for Pain.   2/23/2024 at 0900    aspirin 81 MG Oral Tab EC Take 325  mg by mouth daily.   2/23/2024 at 0900    Glucose Blood (ONETOUCH VERIO) In Vitro Strip Test 1x daily 100 strip 1        Review of Systems:  CONSTITUTIONAL:  negative for fevers, rigors  EYES:  negative for diplopia   RESPIRATORY:  negative for severe shortness of breath  CARDIOVASCULAR:  negative for crushing sub-sternal chest pain  GASTROINTESTINAL:  see HPI  GENITOURINARY:  negative for dysuria or gross hematuria  INTEGUMENT/BREAST:  SKIN:  negative for jaundice   ALLERGIC/IMMUNOLOGIC:  negative for hay fever  ENDOCRINE:  negative for cold intolerance and heat intolerance  MUSCULOSKELETAL:  negative for joint effusion/severe erythema  NEURO: negative for dizziness or loss of conscious or paresthesias  BEHAVIOR/PSYCH:  negative for psychotic behavior    Physical Exam:    Blood pressure 129/60, pulse 85, temperature 98.3 °F (36.8 °C), temperature source Oral, resp. rate 20, weight 129 lb 12.8 oz (58.9 kg), SpO2 91%. Body mass index is 24.53 kg/m².    General: awake, alert and oriented, no acute distress  HEENT: moist mucus membranes  PULM: no conversational dyspnea  CARDIOVASCULAR: regular rate and rhythm, the extremities are warm and well perfused  GI: soft, non-tender, non-distended, + BS, no rebound/guarding   EXTREMITIES: no edema, moving all extremities  SKIN: no visible rash  NEURO: appropriate and interactive    Laboratory Data:  Lab Results   Component Value Date    WBC 11.4 02/26/2024    HGB 6.8 02/26/2024    HCT 23.1 02/26/2024    .0 02/26/2024       Imaging:  US VENOUS DOPPLER LEG BILAT - DIAG IMG (CPT=93970)    Result Date: 2/23/2024  CONCLUSION: No evidence of acute deep venous thrombosis in the imaged veins of the bilateral lower extremities.     elm-remote   Dictated by (CST): Awais Munson MD on 2/23/2024 at 8:40 PM     Finalized by (CST): Awais Munson MD on 2/23/2024 at 8:41 PM          CT CHEST PE AORTA (IV ONLY) (CPT=71260)    Result Date: 2/23/2024  CONCLUSION:   Acute pulmonary embolus  extending from the right lower lobar pulmonary artery to to multiple segmental arteries.  Overall low clot burden.  No CT evidence of right heart strain.  Bilateral upper lobe fibrosis and cystic changes.  Suspected superimposed infection in the left upper lobe.  These findings were communicated with Dr. Alex Álvarez by Dr. Awais Munson at 1719 hours using telephone on 02/23/2024.      elm-[facility]   Dictated by (CST): Awais Munson MD on 2/23/2024 at 5:07 PM     Finalized by (CST): Awais Munson MD on 2/23/2024 at 5:19 PM          XR CHEST AP PORTABLE  (CPT=71045)    Result Date: 2/23/2024  PROCEDURE: XR CHEST AP PORTABLE  (CPT=71045) TIME: 1331  COMPARISON: [facility], XR CHEST AP PORTABLE (CPT=71045), 2/03/2024, 2:28 PM.  INDICATIONS: Shortness of breath for one week.  TECHNIQUE:   Single view.   Findings and impression:  Normal heart size.  No edema  The distal 1.5 centimeter portion of the renea catheter remains malposition in the azygos vein   Normal lung volumes  Decreased mild ill-defined bilateral opacities involving the mid to lower lungs  No new pulmonary opacity  No pneumothorax or pleural effusion   Dictated by (CST): Alec Davis MD on 2/23/2024 at 1:46 PM     Finalized by (CST): Alec Davis MD on 2/23/2024 at 1:47 PM           Assessment & Plan   Chester Bautista is a a(n) 84 year old female w/ a history of breast cancer s/p lumpectomy and radiation, rheumatoid arthritis, COPD, HTN, HLD, DM, who presented with shortness of breath found to have new PE started on XARELTO this admission. GI consulted for acute on chronic anemia. Hgb previously 10's earlier this month, now drifted down to 6.8 this morning.  EGD and colonoscopy 6/2023 with duodenal avm x 3, gastric avm x 1, and cecal avm x 1, each were treated. VCE 12/8/2023 with small bowel AVMs with one focal area of bleeding in the proximal duodenum. Push enteroscopy on 12/21 showed 4 small avms in the second portion of duodenum, all  treated with APC.       Probable recurrent AVM bleed exacerbated by recent initiation of therapeutic anticoagulation     Recommend:  - transfuse to goal hgb >7  - plan egd with push enteroscopy tomorrow  - give venofer  - hold xarelto if able, can give short acting agent, prior to procedure  - discussed the nature of AVM disease and risk of recurrent bleeds and chronic anemia    Zoila Mcqueen MD  [facility] Gastroenterology  2/26/2024    This note was partially prepared using Dragon Medical voice recognition dictation software. As a result, errors may occur. When identified, these errors have been corrected. While every attempt is made to correct errors during dictation, discrepancies may still exist.

## 2024-02-26 NOTE — PHYSICAL THERAPY NOTE
PHYSICAL THERAPY TREATMENT NOTE - INPATIENT     Room Number: 521/521-A       Presenting Problem: patient admitted from home w/ SOB, found to have russell acute PE (Doppler for BLE negative for DVT, started on IV heparin)  Co-Morbidities : RA, breast cancer s/p lumpectomy 2016, COPD on RA, DM    Problem List  Principal Problem:    Acute pulmonary embolism without acute cor pulmonale (HCC)  Active Problems:    Type 2 diabetes mellitus without retinopathy (HCC)    Hypertension    Chronic obstructive pulmonary disease, unspecified (HCC)    VTE (venous thromboembolism)      PHYSICAL THERAPY ASSESSMENT   Patient demonstrates fair progress this session, goals  updated to reflect patient performance.    Patient continues to function near baseline with bed mobility, transfers, gait, maintaining seated position, standing prolonged periods, and performing household tasks.  Contributing factors to remaining limitations include decreased functional strength, decreased endurance/aerobic capacity, impaired standing / sitting balance, impaired motor planning, decreased muscular endurance, and medical status.  Next session anticipate patient to progress bed mobility, transfers, gait, stair negotiation, maintaining seated position, standing prolonged periods, and performing household tasks.  Physical Therapy will continue to follow patient for duration of hospitalization.    Patient continues to benefit from continued skilled PT services: at discharge to promote functional independence in home.  Anticipate patient will return home with home health PT.    PLAN  PT Treatment Plan: Bed mobility;Body mechanics;Energy conservation;Endurance;Patient education;Family education;Gait training;Transfer training;Balance training;Strengthening  Frequency (Obs): 3-5x/week    SUBJECTIVE  I feel a little dizziness at times when I walk but when I take a rest break it goes away.     OBJECTIVE  Precautions: Bed/chair alarm    WEIGHT BEARING RESTRICTION                 PAIN ASSESSMENT   Ratin          BALANCE  Static Sitting: Good  Dynamic Sitting: Fair +  Static Standing: Fair -  Dynamic Standing: Fair -    ACTIVITY TOLERANCE  Pulse: 88  Heart Rate Source: Monitor  Resp: 17  BP: 112/56  BP Location: Right arm  BP Method: Automatic  Patient Position: Sitting     O2 WALK  Oxygen Therapy  SPO2% on Oxygen at Rest: 95  At rest oxygen flow (liters per minute): 3  SPO2% Ambulation on Oxygen: 94  Ambulation oxygen flow (liters per minute): 6    AM-PAC '6-Clicks' INPATIENT SHORT FORM - BASIC MOBILITY  How much difficulty does the patient currently have...  Patient Difficulty: Turning over in bed (including adjusting bedclothes, sheets and blankets)?: None   Patient Difficulty: Sitting down on and standing up from a chair with arms (e.g., wheelchair, bedside commode, etc.): None   Patient Difficulty: Moving from lying on back to sitting on the side of the bed?: None   How much help from another person does the patient currently need...   Help from Another: Moving to and from a bed to a chair (including a wheelchair)?: A Little   Help from Another: Need to walk in hospital room?: A Little   Help from Another: Climbing 3-5 steps with a railing?: A Little     AM-PAC Score:  Raw Score: 21   Approx Degree of Impairment: 28.97%   Standardized Score (AM-PAC Scale): 50.25   CMS Modifier (G-Code): CJ    FUNCTIONAL ABILITY STATUS  Functional Mobility/Gait Assessment  Gait Assistance: Contact guard assist  Distance (ft): 50' x 2  Assistive Device: Rolling walker  Pattern: Shuffle (step to gait)  Stairs: Other (comment) (NT)  Rolling: contact guard assist  Supine to Sit: supervision  Sit to Supine: contact guard assist  Sit to Stand: contact guard assist    Additional information: Pt is benefiting from increased 02 support 6 lts with amb to maintain 02 sats in low 90's with ambulation Hgb 6.8 with a transfusion pending later today. PMH anemia pt tolerating low intensity gait and  therex at this time. Pt benefits from verbal cues for optimal energy conservation techniques. Verbal cues and return demo are preferred learning methods.     The patient's Approx Degree of Impairment: 28.97% has been calculated based on documentation in the [facility] '6 clicks' Inpatient Daily Activity Short Form.  Research supports that patients with this level of impairment may benefit from Home PT.  Final disposition will be made by interdisciplinary medical team.    THERAPEUTIC EXERCISES  Lower Extremity Alternating marching  Ankle pumps  LAQ     Position Sitting & Standing       Patient End of Session: Up in chair;With  staff;Needs met;RN aware of session/findings;Call light within reach;All patient questions and concerns addressed;Alarm set;Family present    CURRENT GOALS   Goals to be met by: 3.5.24  Patient Goal Patient's self-stated goal is: Home w/ dtr   Goal #1 Patient is able to demonstrate supine - sit EOB @ level: independent      Goal #1   Current Status  SBA   Goal #2 Patient is able to demonstrate transfers Sit to/from Stand at assistance level: supervision with walker - rolling      Goal #2  Current Status  CGA with RW   Goal #3 Patient is able to ambulate 50 feet with assist device: walker - rolling at assistance level: supervision   Goal #3   Current Status  CGA with RW 50' x 2 6 lts 02 support intermittent c/o dizziness    Goal #4 Patient will negotiate 14 stairs/one curb w/ assistive device and supervision   Goal #4   Current Status  NT   Goal #5 Patient to demonstrate independence with home activity/exercise instructions provided to patient in preparation for discharge.   Goal #5   Current Status  Ongoing     Gait Training: 15 minutes  Therapeutic Exercise: 8 minutes

## 2024-02-27 ENCOUNTER — ANESTHESIA EVENT (OUTPATIENT)
Dept: ENDOSCOPY | Facility: HOSPITAL | Age: 84
End: 2024-02-27
Payer: MEDICARE

## 2024-02-27 ENCOUNTER — ANESTHESIA (OUTPATIENT)
Dept: ENDOSCOPY | Facility: HOSPITAL | Age: 84
End: 2024-02-27
Payer: MEDICARE

## 2024-02-27 LAB
APTT PPP: 154.1 SECONDS (ref 23.3–35.6)
APTT PPP: 202.1 SECONDS (ref 23.3–35.6)
APTT PPP: 28.9 SECONDS (ref 23.3–35.6)
APTT PPP: 30 SECONDS (ref 23.3–35.6)
BASOPHILS # BLD AUTO: 0.06 X10(3) UL (ref 0–0.2)
BASOPHILS NFR BLD AUTO: 0.4 %
BLOOD TYPE BARCODE: 7300
DEPRECATED RDW RBC AUTO: 51.8 FL (ref 35.1–46.3)
EOSINOPHIL # BLD AUTO: 0.34 X10(3) UL (ref 0–0.7)
EOSINOPHIL NFR BLD AUTO: 2.5 %
ERYTHROCYTE [DISTWIDTH] IN BLOOD BY AUTOMATED COUNT: 17.5 % (ref 11–15)
GLUCOSE BLDC GLUCOMTR-MCNC: 168 MG/DL (ref 70–99)
GLUCOSE BLDC GLUCOMTR-MCNC: 178 MG/DL (ref 70–99)
GLUCOSE BLDC GLUCOMTR-MCNC: 231 MG/DL (ref 70–99)
HCT VFR BLD AUTO: 30.5 %
HGB BLD-MCNC: 10.2 G/DL
IMM GRANULOCYTES # BLD AUTO: 0.17 X10(3) UL (ref 0–1)
IMM GRANULOCYTES NFR BLD: 1.2 %
LYMPHOCYTES # BLD AUTO: 2.13 X10(3) UL (ref 1–4)
LYMPHOCYTES NFR BLD AUTO: 15.5 %
MCH RBC QN AUTO: 29.9 PG (ref 26–34)
MCHC RBC AUTO-ENTMCNC: 33.4 G/DL (ref 31–37)
MCV RBC AUTO: 89.4 FL
MONOCYTES # BLD AUTO: 1.09 X10(3) UL (ref 0.1–1)
MONOCYTES NFR BLD AUTO: 7.9 %
NEUTROPHILS # BLD AUTO: 9.96 X10 (3) UL (ref 1.5–7.7)
NEUTROPHILS # BLD AUTO: 9.96 X10(3) UL (ref 1.5–7.7)
NEUTROPHILS NFR BLD AUTO: 72.5 %
PLATELET # BLD AUTO: 367 10(3)UL (ref 150–450)
RBC # BLD AUTO: 3.41 X10(6)UL
UNIT VOLUME: 350 ML
WBC # BLD AUTO: 13.8 X10(3) UL (ref 4–11)

## 2024-02-27 PROCEDURE — 43270 EGD LESION ABLATION: CPT | Performed by: INTERNAL MEDICINE

## 2024-02-27 PROCEDURE — 99232 SBSQ HOSP IP/OBS MODERATE 35: CPT | Performed by: PHYSICIAN ASSISTANT

## 2024-02-27 PROCEDURE — 99232 SBSQ HOSP IP/OBS MODERATE 35: CPT | Performed by: INTERNAL MEDICINE

## 2024-02-27 PROCEDURE — 0W3P8ZZ CONTROL BLEEDING IN GASTROINTESTINAL TRACT, VIA NATURAL OR ARTIFICIAL OPENING ENDOSCOPIC: ICD-10-PCS | Performed by: INTERNAL MEDICINE

## 2024-02-27 RX ORDER — SODIUM CHLORIDE, SODIUM LACTATE, POTASSIUM CHLORIDE, CALCIUM CHLORIDE 600; 310; 30; 20 MG/100ML; MG/100ML; MG/100ML; MG/100ML
INJECTION, SOLUTION INTRAVENOUS CONTINUOUS
Status: DISCONTINUED | OUTPATIENT
Start: 2024-02-27 | End: 2024-02-27

## 2024-02-27 RX ORDER — HEPARIN SODIUM 1000 [USP'U]/ML
80 INJECTION, SOLUTION INTRAVENOUS; SUBCUTANEOUS ONCE
Status: DISCONTINUED | OUTPATIENT
Start: 2024-02-27 | End: 2024-02-27

## 2024-02-27 RX ORDER — SODIUM CHLORIDE, SODIUM LACTATE, POTASSIUM CHLORIDE, CALCIUM CHLORIDE 600; 310; 30; 20 MG/100ML; MG/100ML; MG/100ML; MG/100ML
INJECTION, SOLUTION INTRAVENOUS CONTINUOUS PRN
Status: DISCONTINUED | OUTPATIENT
Start: 2024-02-27 | End: 2024-02-27 | Stop reason: SURG

## 2024-02-27 RX ORDER — HEPARIN SODIUM AND DEXTROSE 10000; 5 [USP'U]/100ML; G/100ML
18 INJECTION INTRAVENOUS ONCE
Status: COMPLETED | OUTPATIENT
Start: 2024-02-27 | End: 2024-02-27

## 2024-02-27 RX ORDER — LIDOCAINE HYDROCHLORIDE 10 MG/ML
INJECTION, SOLUTION EPIDURAL; INFILTRATION; INTRACAUDAL; PERINEURAL AS NEEDED
Status: DISCONTINUED | OUTPATIENT
Start: 2024-02-27 | End: 2024-02-27 | Stop reason: SURG

## 2024-02-27 RX ORDER — HEPARIN SODIUM AND DEXTROSE 10000; 5 [USP'U]/100ML; G/100ML
INJECTION INTRAVENOUS CONTINUOUS
Status: DISCONTINUED | OUTPATIENT
Start: 2024-02-27 | End: 2024-02-27

## 2024-02-27 RX ORDER — NALOXONE HYDROCHLORIDE 0.4 MG/ML
0.08 INJECTION, SOLUTION INTRAMUSCULAR; INTRAVENOUS; SUBCUTANEOUS ONCE AS NEEDED
Status: DISCONTINUED | OUTPATIENT
Start: 2024-02-27 | End: 2024-02-27 | Stop reason: HOSPADM

## 2024-02-27 RX ORDER — HEPARIN SODIUM AND DEXTROSE 10000; 5 [USP'U]/100ML; G/100ML
18 INJECTION INTRAVENOUS ONCE
Status: DISCONTINUED | OUTPATIENT
Start: 2024-02-27 | End: 2024-02-27

## 2024-02-27 RX ORDER — HEPARIN SODIUM AND DEXTROSE 10000; 5 [USP'U]/100ML; G/100ML
INJECTION INTRAVENOUS CONTINUOUS
Status: DISCONTINUED | OUTPATIENT
Start: 2024-02-27 | End: 2024-02-28

## 2024-02-27 RX ADMIN — SODIUM CHLORIDE, SODIUM LACTATE, POTASSIUM CHLORIDE, CALCIUM CHLORIDE: 600; 310; 30; 20 INJECTION, SOLUTION INTRAVENOUS at 13:27:00

## 2024-02-27 RX ADMIN — SODIUM CHLORIDE, SODIUM LACTATE, POTASSIUM CHLORIDE, CALCIUM CHLORIDE: 600; 310; 30; 20 INJECTION, SOLUTION INTRAVENOUS at 13:58:00

## 2024-02-27 RX ADMIN — LIDOCAINE HYDROCHLORIDE 30 MG: 10 INJECTION, SOLUTION EPIDURAL; INFILTRATION; INTRACAUDAL; PERINEURAL at 13:32:00

## 2024-02-27 NOTE — INTERVAL H&P NOTE
Pre-op Diagnosis: Anemia, Hx of AVMs    The above referenced H&P was reviewed by Zoila Bolton Ma, MD on 2/27/2024, the patient was examined and no significant changes have occurred in the patient's condition since the H&P was performed.  I discussed with the patient and/or legal representative the potential benefits, risks and side effects of this procedure; the likelihood of the patient achieving goals; and potential problems that might occur during recuperation.  I discussed reasonable alternatives to the procedure, including risks, benefits and side effects related to the alternatives and risks related to not receiving this procedure.  We will proceed with procedure as planned.

## 2024-02-27 NOTE — PLAN OF CARE
Patient alert and oriented. Tele. Continuous pulse ox. Heparin drip. NPO at midnight. Frequent rounding by staff. Call light within reach. Safety precautions in place.  Problem: Patient Centered Care  Goal: Patient preferences are identified and integrated in the patient's plan of care  Description: Interventions:  - What would you like us to know as we care for you? From home with family  - Provide timely, complete, and accurate information to patient/family  - Incorporate patient and family knowledge, values, beliefs, and cultural backgrounds into the planning and delivery of care  - Encourage patient/family to participate in care and decision-making at the level they choose  - Honor patient and family perspectives and choices  2/27/2024 0210 by Tabby Shrestha RN  Outcome: Progressing  2/27/2024 0209 by Tabby Shrestha RN  Outcome: Progressing     Problem: CARDIOVASCULAR - ADULT  Goal: Maintains optimal cardiac output and hemodynamic stability  Description: INTERVENTIONS:  - Monitor vital signs, rhythm, and trends  - Monitor for bleeding, hypotension and signs of decreased cardiac output  - Evaluate effectiveness of vasoactive medications to optimize hemodynamic stability  - Monitor arterial and/or venous puncture sites for bleeding and/or hematoma  - Assess quality of pulses, skin color and temperature  - Assess for signs of decreased coronary artery perfusion - ex. Angina  - Evaluate fluid balance, assess for edema, trend weights  2/27/2024 0210 by Tabby Shrestha RN  Outcome: Progressing  2/27/2024 0209 by Tabby Shrestha RN  Outcome: Progressing     Problem: RESPIRATORY - ADULT  Goal: Achieves optimal ventilation and oxygenation  Description: INTERVENTIONS:  - Assess for changes in respiratory status  - Assess for changes in mentation and behavior  - Position to facilitate oxygenation and minimize respiratory effort  - Oxygen supplementation based on oxygen saturation or ABGs  - Provide Smoking Cessation handout,  if applicable  - Encourage broncho-pulmonary hygiene including cough, deep breathe, Incentive Spirometry  - Assess the need for suctioning and perform as needed  - Assess and instruct to report SOB or any respiratory difficulty  - Respiratory Therapy support as indicated  - Manage/alleviate anxiety  - Monitor for signs/symptoms of CO2 retention  2/27/2024 0210 by Tabby Shrestha RN  Outcome: Progressing  2/27/2024 0209 by Tabby Shrestha RN  Outcome: Progressing     Problem: PAIN - ADULT  Goal: Verbalizes/displays adequate comfort level or patient's stated pain goal  Description: INTERVENTIONS:  - Encourage pt to monitor pain and request assistance  - Assess pain using appropriate pain scale  - Administer analgesics based on type and severity of pain and evaluate response  - Implement non-pharmacological measures as appropriate and evaluate response  - Consider cultural and social influences on pain and pain management  - Manage/alleviate anxiety  - Utilize distraction and/or relaxation techniques  - Monitor for opioid side effects  - Notify MD/LIP if interventions unsuccessful or patient reports new pain  - Anticipate increased pain with activity and pre-medicate as appropriate  2/27/2024 0210 by Tabby Shrestha RN  Outcome: Progressing  2/27/2024 0209 by Tabby Shrestha RN  Outcome: Progressing     Problem: RISK FOR INFECTION - ADULT  Goal: Absence of fever/infection during anticipated neutropenic period  Description: INTERVENTIONS  - Monitor WBC  - Administer growth factors as ordered  - Implement neutropenic guidelines  2/27/2024 0210 by Tabby Shrestha RN  Outcome: Progressing  2/27/2024 0209 by Tabby Shrestha RN  Outcome: Progressing     Problem: SAFETY ADULT - FALL  Goal: Free from fall injury  Description: INTERVENTIONS:  - Assess pt frequently for physical needs  - Identify cognitive and physical deficits and behaviors that affect risk of falls.  - [place] fall precautions as indicated by assessment.  -  Educate pt/family on patient safety including physical limitations  - Instruct pt to call for assistance with activity based on assessment  - Modify environment to reduce risk of injury  - Provide assistive devices as appropriate  - Consider OT/PT consult to assist with strengthening/mobility  - Encourage toileting schedule  2/27/2024 0210 by Tabby Shrestha RN  Outcome: Progressing  2/27/2024 0209 by Tabby Shrestha RN  Outcome: Progressing     Problem: Patient/Family Goals  Goal: Patient/Family Long Term Goal  Description: Patient's Long Term Goal: to go home    Interventions:  - - Monitor vitals  - Monitor appropriate labs  - Pain management  - Follow MD order  - Diagnostics per order  - Update/Informing patient and family on plan of care  - Discharge planning  - See additional Care Plan goals for specific interventions  2/27/2024 0210 by Tabby Shrestha RN  Outcome: Progressing  2/27/2024 0209 by Tabby Shrestha RN  Outcome: Progressing  Goal: Patient/Family Short Term Goal  Description: Patient's Short Term Goal: to feel better    Interventions:   - - Monitor vitals  - Monitor appropriate labs  - Pain management  - Follow MD order  - Diagnostics per order  - Update/Informing patient and family on plan of care  - Discharge planning  - See additional Care Plan goals for specific interventions  2/27/2024 0210 by Tabby Shrestha RN  Outcome: Progressing  2/27/2024 0209 by Tabby Shrestha RN  Outcome: Progressing     Problem: DISCHARGE PLANNING  Goal: Discharge to home or other facility with appropriate resources  Description: INTERVENTIONS:  - Identify barriers to discharge w/pt and caregiver  - Include patient/family/discharge partner in discharge planning  - Arrange for needed discharge resources and transportation as appropriate  - Identify discharge learning needs (meds, wound care, etc)  - Arrange for interpreters to assist at discharge as needed  - Consider post-discharge preferences of patient/family/discharge  partner  - Complete POLST form as appropriate  - Assess patient's ability to be responsible for managing their own health  - Refer to Case Management Department for coordinating discharge planning if the patient needs post-hospital services based on physician/LIP order or complex needs related to functional status, cognitive ability or social support system  2/27/2024 0210 by Tabby Shrestha, RN  Outcome: Progressing  2/27/2024 0209 by Tabby Shrestha, RN  Outcome: Progressing

## 2024-02-27 NOTE — PROGRESS NOTES
[facility]    Progress Note    Chester Bautista Patient Status:  Inpatient    1940 MRN [id number]   Location [facility]5W Attending Heike Sorto MD   Hosp Day # 4 PCP Heike Sorto MD     Subjective:   Seen and examined this morning. Feels better today after blood transfusion yesterday. Fatigue and weakness improved. No shortness of breath. No chest pain or pleurisy. On 2.5 L O2. Plans for EGD today.    Objective:   Blood pressure 117/70, pulse 95, temperature 98.6 °F (37 °C), temperature source Oral, resp. rate 17, weight 132 lb 12.8 oz (60.2 kg), SpO2 93%.  Physical Exam  Vitals and nursing note reviewed.   Constitutional:       General: She is awake. She is not in acute distress.     Interventions: Nasal cannula in place.   HENT:      Head: Normocephalic and atraumatic.   Cardiovascular:      Rate and Rhythm: Normal rate and regular rhythm.   Pulmonary:      Effort: No respiratory distress.      Breath sounds: No wheezing, rhonchi or rales.      Comments: Diminished breath sounds bilaterally  Abdominal:      General: Bowel sounds are normal. There is no distension.      Palpations: Abdomen is soft.      Tenderness: There is no abdominal tenderness.   Musculoskeletal:      Cervical back: Normal range of motion and neck supple.      Right lower leg: No edema.      Left lower leg: No edema.   Skin:     General: Skin is warm and dry.   Neurological:      General: No focal deficit present.      Mental Status: She is alert and oriented to person, place, and time.   Psychiatric:         Mood and Affect: Mood normal.         Behavior: Behavior is cooperative.       Results:   Lab Results   Component Value Date    WBC 13.8 (H) 2024    HGB 10.2 (L) 2024    HCT 30.5 (L) 2024    .0 2024    CREATSERUM 1.05 (H) 2024    BUN 21 2024     2024    K 4.5 2024    K 4.5 2024     2024    CO2 24.0 2024      (H) 02/25/2024    CA 9.2 02/25/2024    ALB 3.4 02/24/2024    ALKPHO 63 02/24/2024    BILT 0.2 02/24/2024    TP 6.4 02/24/2024    AST 18 02/24/2024    ALT <7 (L) 02/24/2024    .1 (H) 02/27/2024    INR 0.93 12/14/2023    TSH 0.622 10/20/2023    DDIMER 1.60 (H) 02/23/2024    ESRML 91 (H) 09/14/2023    CRP 1.10 (H) 09/14/2023    MG 1.0 (LL) 02/16/2024    TROPHS 9 02/23/2024    B12 748 10/20/2023       Assessment & Plan:   Acute pulmonary embolism  CTA chest with RLL PE, low clot burden and no major central obstruction  No CT evidence of RH strain  LE Doppler US no DVT  Recent hospitalization may be main risk factor  History of breast cancer  Xarelto on hold for anemia -> back on IV heparin given plans for EGD  Hemodynamically stable, requiring 2.5 L O2  Plan:  -O2 and wean as tolerated  -Hold anticoagulation given plans for EGD today  -SCDs    Acute on chronic anemia  GI bleeding  History of GI bleeding with AVMs  Hemoglobin improved s/p 1 unit pRBC  Plan:  -Follow hemoglobin  -EGD today per GI  -Will follow up after EGD with anticoagulation recommendations    Abnormal CT chest  History of scarring, bronchiectasis, pneumatoceles, recent pneumonia, and question of prior history of sarcoidosis  Plan:  -Follow-up CT chest in 6-8 weeks    UTI  Urine culture with Klebsiella aerogenes  Resistant to nitrofurantoin  Plan:  -Antibiotics per primary service    ADDENDUM: GI procedure note reviewed. Plans for repeat EGD tomorrow 2/28. Okay to resume heparin gtt, stop 6 hours prior to EGD. Heparin gtt resumed and stop 2/28 at 7 am. D/w MAGI.    PETRA Mcintyre-C  Pulmonary Medicine  2/27/2024

## 2024-02-27 NOTE — DIETARY NOTE
ADULT NUTRITION INITIAL ASSESSMENT    Pt is at moderate nutrition risk.  Pt does not meet malnutrition criteria.      RECOMMENDATIONS TO MD: See Nutrition Intervention    ADMITTING DIAGNOSIS:  Other pulmonary embolism without acute cor pulmonale, unspecified chronicity (HCC) [I26.99]  PERTINENT PAST MEDICAL HISTORY:   Past Medical History:   Diagnosis Date    Anxiety state     Cancer (HCC)     breast cancer 2018    Chronic obstructive pulmonary disease, unspecified (HCC) 05/04/2023    Chronic obstructive pulmonary disease, unspecified (HCC) 05/04/2023    COPD (chronic obstructive pulmonary disease) (HCC)     Diabetes (HCC)     Essential hypertension     Exposure to medical diagnostic radiation     High blood pressure     High cholesterol     History of blood transfusion 07/2022    low hemoglobin    Osteoarthritis     PONV (postoperative nausea and vomiting)     Pulmonary emphysema (HCC)     Rheumatoid arthritis (HCC)      PATIENT STATUS: Initial 02/27/24: Pt admit for pulmonary embolism with acute cor pulmonale. PMH sig for COPD, T2DM, Breast Cancer s/p lumpectomy and radiation (2016) and others noted above. Pt assessed due to consult received for loss of appetite and weight loss. Pt screened at no nutrition risk at admission by RN. Chart reviewed, pt previously admitted for hypokalemia and pneumonia vs pulmonary edema chest x-ray about 2 weeks ago. Since discharge from previous admission, pt noted to have shortness of breath with exertion. Pt noted with acute pulmonary embolism. Pt NPO this morning for EGD with push enteroscopy - moderate amount of solid food in stomach obscuring visualization, procedure aborted with plan to retry tomorrow. Pt currently on clear liquid diet (CLD). Pt visited, no family at bedside. Pt reports relatively good intake. Typically eats 2 meals a day. Pt reports used to drink 2 Ensures daily but has not in awhile as eating better and would rely on them as meals instead of cooking. Pt  reports weight loss over last week despite no changes in appetite or intake. Usual body weight (UBW) about 142#. Current weight 132# 12.8 oz (standing scale). EMR weight review, last known weight prior to admission (PTA) 143# 3 oz on 2/16/24 - 10.4# or 7.2% weight loss x 11 days (significant). Per EMR weight review, pt noted weighing 148# on 11/24/23 - 15.2 or 10.3% weight loss x 3 months (significant) and 143# 13 oz on 8/14/23 - 11# or 7.6% weight loss x 6 months (non-significant). Nutrition focused physical exam (NFPE) completed, see below for details. Encouraged adequate energy and protein intake when solid diet resumed. Pt reports she does laps around her house for exercise. Discussed adding oral nutritional supplement (ONS) to help maximize nutrition, pt in agreement and provided preferences. +ONS per discussion     FOOD/NUTRITION RELATED HISTORY:  Appetite:  good appetite PTA per pt  Intake: ~% x6 meals documented since admit - averaging 75% overall (good)  Intake Meeting Needs: Marginal, added oral nutrition supplements (ONS)  Percent Meals Eaten (last 6 days)       Date/Time Percent Meals Eaten (%)    02/24/24 0932 70 %    02/24/24 1310 10 %    02/25/24 1016 100 %    02/25/24 1411 90 %    02/26/24 1041 80 %    02/26/24 1839 100 %          Food Allergies: No Known Food Allergies (NKFA)  Cultural/Ethnic/Orthodoxy Preferences: Not Obtained    GASTROINTESTINAL: +BM 2/23/2024    MEDICATIONS: reviewed Oral Torsemide daily   cefTRIAXone  1 g Intravenous Q24H    rivaroxaban  15 mg Oral BID with meals    insulin aspart  1-7 Units Subcutaneous TID CC    cyclobenzaprine  10 mg Oral Nightly    carvedilol  25 mg Oral BID with meals    fluticasone furoate-vilanterol  1 puff Inhalation Daily    glycopyrrolate  1 mg Oral TID    hydroxychloroquine  200 mg Oral Daily    cetirizine  5 mg Oral Daily    montelukast  10 mg Oral Nightly    NIFEdipine ER  60 mg Oral Daily    pantoprazole  20 mg Oral QAM AC    potassium  chloride  20 mEq Oral BID    predniSONE  5 mg Oral Daily    rosuvastatin  10 mg Oral Nightly    spironolactone  50 mg Oral Daily    torsemide  20 mg Oral Daily    losartan  50 mg Oral Daily      lactated ringers      continuous dose heparin       LABS: reviewed A1c 9.5% on 2/2/24   POC Glucose reviewed  Recent Labs     02/24/24  0643 02/25/24  0550   * 107*   BUN 22 21   CREATSERUM 1.02 1.05*   CA 8.5* 9.2    141   K 3.8 4.5  4.5   * 110   CO2 24.0 24.0   OSMOCALC 300* 295     NUTRITION RELATED PHYSICAL FINDINGS:  - Nutrition Focused Physical Exam (NFPE): no wasting noted   - Fluid Accumulation: non-pitting Right Lower extremity and Foot  see RN documentation for details  - Skin Integrity: intact see RN documentation for details    ANTHROPOMETRICS:  HT: 154.9 cm (5' 1\")  WT: 60.2 kg (132 lb 12.8 oz) - standing scale  Admit Wt: 137# on 2/23/24  BMI: Body mass index is 25.09 kg/m².  BMI CLASSIFICATION: 25-29.9 kg/m2 - overweight  IBW: 105 lbs        126% IBW  Usual Body Wt: 142 lbs per pt      94% UBW    WEIGHT HISTORY:  Patient Weight(s) for the past 336 hrs:   Weight   02/27/24 1242 60.2 kg (132 lb 12.8 oz)   02/27/24 0650 60.2 kg (132 lb 12.8 oz)   02/26/24 0510 58.9 kg (129 lb 12.8 oz)   02/23/24 1936 61.7 kg (136 lb)     Wt Readings from Last 10 Encounters:   02/27/24 60.2 kg (132 lb 12.8 oz)   02/23/24 62.1 kg (137 lb)   02/16/24 65 kg (143 lb 3.2 oz)   02/15/24 64.4 kg (142 lb)   02/04/24 64.1 kg (141 lb 4.8 oz)   02/02/24 65.9 kg (145 lb 3.2 oz)   12/21/23 66.7 kg (147 lb)   12/14/23 66.7 kg (147 lb)   12/07/23 66.9 kg (147 lb 8 oz)   12/01/23 66.7 kg (147 lb)     NUTRITION DIAGNOSIS/PROBLEM:   Unintended wt loss related to Decreased ability to consume sufficient energy as evidenced by significant weight loss noted over last  few weeks    NUTRITION INTERVENTION:   NUTRITION PRESCRIPTION:   Estimated Nutrition needs: --dosing wt of 60.2 kg - wt taken on 2/27/2024  Calories: 7509-2646  calories/day (25-30 calories per kg Dosing wt)  Protein: 60-72 g protein/day (1.0-1.2 g protein/kg Dosing wt)    - Diet:       Procedures    Clear liquid diet Is Patient on Accuchecks? Yes; Misc Restriction: Cardiac    NPO      - RD Care Plan: Encouraged increased PO intake and Initiated ONS (oral nutritional supplements)  - Meals and snacks: Encouraged increased PO intake  - Medical Food Supplements-RD added Ensure Clear (240 calories/ 8 g protein each) Daily Apple. Rational/use of oral supplements discussed.  - Vitamin and mineral supplements: none  - Feeding assistance: meal set up  - Nutrition education: Discussed importance of adequate energy and protein intake     - Coordination of nutrition care: collaboration with other providers  - Discharge and transfer of nutrition care to new setting or provider: monitor plans    MONITOR AND EVALUATE/NUTRITION GOALS:  - Food and Nutrient Intake:      Monitor: adequacy of PO intake and adequacy of supplement intake  - Food and Nutrient Administration:      Monitor: N/A  - Anthropometric Measurement:    Monitor weight  - Nutrition Goals:      maintain wt within 5%, PO and supplement greater than 75% of needs, good supplement intake, labs within acceptable limits, minimize lean body mass loss, prevent skin breakdown, and support body systems    DIETITIAN FOLLOW UP: RD to follow and monitor nutrition status    Rosio Robison MS, GUANACO, RDN, LDN  [id number]

## 2024-02-27 NOTE — PROGRESS NOTES
[facility]    Progress Note    Chester Bautista Patient Status:  Inpatient    1940 MRN [id number]   Location [facility]5W Attending Heike Sorto MD   Hosp Day # 4 PCP Heike Sorto MD     Chief Complaint: She denies any complaints today    Subjective:     Constitutional: Negative.    HENT: Negative.     Eyes: Negative.    Respiratory: Negative.     Cardiovascular: Negative.    Gastrointestinal: Negative.    Genitourinary: Negative.    Musculoskeletal: Negative.    Skin: Negative.    Hematological: Negative.    Psychiatric/Behavioral: Negative.         Objective:   Blood pressure 117/70, pulse 95, temperature 98.6 °F (37 °C), temperature source Oral, resp. rate 17, weight 132 lb 12.8 oz (60.2 kg), SpO2 93%.  Physical Exam  Vitals and nursing note reviewed.   Constitutional:       Appearance: Normal appearance.   HENT:      Head: Normocephalic and atraumatic.   Cardiovascular:      Rate and Rhythm: Normal rate and regular rhythm.      Pulses: Normal pulses.      Heart sounds: Normal heart sounds.   Pulmonary:      Effort: Pulmonary effort is normal.      Breath sounds: Normal breath sounds.   Abdominal:      General: Bowel sounds are normal.      Palpations: Abdomen is soft.   Musculoskeletal:         General: Normal range of motion.      Cervical back: Normal range of motion and neck supple.   Skin:     General: Skin is warm and dry.   Neurological:      Mental Status: She is alert. Mental status is at baseline.   Psychiatric:         Mood and Affect: Mood normal.         Results:   Lab Results   Component Value Date    WBC 13.8 (H) 2024    HGB 10.2 (L) 2024    HCT 30.5 (L) 2024    .0 2024    CREATSERUM 1.05 (H) 2024    BUN 21 2024     2024    K 4.5 2024    K 4.5 2024     2024    CO2 24.0 2024     (H) 2024    CA 9.2 2024    ALB 3.4 2024    ALKPHO 63  02/24/2024    BILT 0.2 02/24/2024    TP 6.4 02/24/2024    AST 18 02/24/2024    ALT <7 (L) 02/24/2024    .1 (H) 02/27/2024    INR 0.93 12/14/2023    TSH 0.622 10/20/2023    DDIMER 1.60 (H) 02/23/2024    ESRML 91 (H) 09/14/2023    CRP 1.10 (H) 09/14/2023    MG 1.0 (LL) 02/16/2024    TROPHS 9 02/23/2024    B12 748 10/20/2023       No results found.        Assessment & Plan:       Acute pulmonary embolism without acute cor pulmonale  -Bilateral lower extremity Doppler negative for DVT Xarelto she is back on heparin which is held this morning patient is going for EGD      Anemia status post 1 unit PRBC, hemoglobin stable today, history of AVM seen by GI plan for EGD       Type 2 diabetes mellitus without retinopathy (HCC)  With sliding scale insulin,   hypoglycemia protocol    Hypertension  -Continue home meds       Chronic obstructive pulmonary disease, unspecified (HCC)  -Continue inhalers-patient is supposed to be on hydroxychloroquine       UA shows Klebsiella, DC Macrobid started on Rocephin     Rheumatoid arthritis (HCC)  -Daily prednisone and hydroxychloroquine     HLD  -Continue statin        GERD  -Continue pantoprazole          VARINDER LATHAM MD  2/27/2024

## 2024-02-27 NOTE — OPERATIVE REPORT
ESOPHAGOGASTRODUODENOSCOPY (EGD) REPORT    Chester Bautista     1940 Age 84 year old   PCP Heike Sorto MD Endoscopist Zoila Mcqueen MD     Date of procedure: 24    Procedure: EGD w/control of bleeding    Pre-operative diagnosis: anemia    Post-operative diagnosis: see impression    Medications: MAC    Complications: none    Procedure:  Informed consent was obtained from the patient after the risks of the procedure were discussed, including but not limited to bleeding, perforation, aspiration, infection, or possibility of a missed lesion. After discussions of the risks/benefits and alternatives to this procedure, as well as the planned sedation, the patient was placed in the left lateral decubitus position and begun on continuous blood pressure pulse oximetry and EKG monitoring and this was maintained throughout the procedure. Once an adequate level of sedation was obtained a bite block was placed. Then the lubricated tip of the [id number] diagnostic video upper endoscope was inserted and advanced using direct visualization into the posterior pharynx and ultimately into the esophagus, stomach.    Complications: None    Findings:      1. Esophagus: Small 2 cm hiatal hernia. Normal esophagus.     2. Stomach: We then entered the stomach. Moderate amount of solid food in stomach obscuring visualization. There were at least two small AVMs in the greater curvature of the stomach, one of them which was spontaneously oozing. Both were treated with 7F argon plasma coagulation using multiple pulses until hemostasis achieved. Hematin staining in stomach. There appeared to be additional AVMs but these were largely obscured by the presence of solid food.     She had persistent retching and cough, in light of solid food in stomach, decision made to abort procedure.      We then withdrew the instrument from the patient who tolerated the procedure well.  The duodenum was not traversed or visualized.      Impression:   1. Gastric AVMs s/p cautery  2. Solid food in stomach obscuring visualization. Procedure aborted, incomplete exam .    Recommend:  1. Plan for relook EGD tomorrow with likely further APC therapy  2. May resume heparin gtt tonight and again stop 6 hours prior to procedure      >>>If tissue was sampled/removed and you have not received your pathology results either by phone or letter within 2 weeks, please call our office at [phone].    Specimens:  none    Blood loss: <1 ml      Zoila Mcqueen MD  [facility]

## 2024-02-27 NOTE — ANESTHESIA PREPROCEDURE EVALUATION
Anesthesia PreOp Note    HPI:     Chester Bautista is a 84 year old female who presents for preoperative consultation requested by: Zoila Mcqueen MD    Date of Surgery: 2/23/2024 - 2/27/2024    Procedure(s):  ESOPHAGOGASTRODUODENOSCOPY (EGD) with PUSH ENTEROSCOPY  Indication: Anemia, Hx of AVMs    Relevant Problems   No relevant active problems       NPO:  Last Liquid Consumption Date: 02/27/24  Last Liquid Consumption Time: 0911  Last Solid Consumption Date: 02/26/24  Last Solid Consumption Time: 2300  Last Liquid Consumption Date: 02/27/24          History Review:  Patient Active Problem List    Diagnosis Date Noted    ABLA (acute blood loss anemia) 02/26/2024    VTE (venous thromboembolism) 02/25/2024    PE (pulmonary thromboembolism) (HCC) 02/23/2024    Hypokalemia 02/02/2024    High risk medication use 07/13/2023    Meibomian gland dysfunction (MGD) of both eyes 07/13/2023    Encounter for care related to vascular access port 07/11/2023    Adnexal mass 05/31/2023    Chronic obstructive pulmonary disease, unspecified (HCC) 05/04/2023    Port-A-Cath in place 10/19/2022    Iron deficiency anemia due to chronic blood loss 08/08/2022    Malabsorption of iron (HCC) 08/08/2022    Pseudophakia of both eyes 07/27/2022    Vitreous floaters of both eyes 07/27/2022    Glaucoma suspect of both eyes 07/27/2022    AVM (arteriovenous malformation) of duodenum, acquired 07/25/2022    Neuropathy 06/28/2022    Malabsorption ([facility]) 05/23/2022    Peripheral vascular disease due to secondary diabetes mellitus ([facility]) 03/09/2022    Type 2 diabetes mellitus without retinopathy ([facility]) 11/05/2021    Sarcoidosis 11/05/2021    Dyslipidemia 11/05/2021    Hypertension 11/05/2021    Rheumatoid arthritis (HCC) 11/05/2021    Osteoarthrosis 11/05/2021    Adult general medical examination 11/04/2021    Vaccine counseling 11/04/2021    Colon cancer screening 11/04/2021    Hypercalcemia 02/22/2017    Infiltrating ductal carcinoma of right  breast, stage 1 (HCC) 06/22/2016    Anemia, iron deficiency 10/17/2014       Past Medical History:   Diagnosis Date    Anxiety state     Cancer (HCC)     breast cancer 2018    Chronic obstructive pulmonary disease, unspecified (HCC) 05/04/2023    Chronic obstructive pulmonary disease, unspecified (HCC) 05/04/2023    COPD (chronic obstructive pulmonary disease) (HCC)     Diabetes (HCC)     Essential hypertension     Exposure to medical diagnostic radiation     High blood pressure     High cholesterol     History of blood transfusion 07/2022    low hemoglobin    Osteoarthritis     PONV (postoperative nausea and vomiting)     Pulmonary emphysema (HCC)     Rheumatoid arthritis (HCC)        Past Surgical History:   Procedure Laterality Date    CATARACT EXTRACTION W/  INTRAOCULAR LENS IMPLANT Bilateral 2017    Dr in [facility]     COLONOSCOPY      COLONOSCOPY N/A 07/21/2022    Procedure: COLONOSCOPY;  Surgeon: Mikey Garcia MD;  Location: [facility] ENDOSCOPY    COLONOSCOPY N/A 06/15/2023    Procedure: COLONOSCOPY;  Surgeon: Mikey Garcia MD;  Location: [facility] ENDOSCOPY    CORRECT BUNION,SIMPLE      right foot  1993    EGD  12/21/2023    Dr. Garcia; Duodenal AVM's x4 cauterized    HYSTERECTOMY      1972, no abnormal Paps, due to heavy bleeding with fibroids.  Not sure if it had bilateral salpingo-oophorectomy.    IR ANEURYSM REPAIRM  2010    Computer assisted volumetric craniofomy with clipping of anterior cerebral artery.    LUMPECTOMY RIGHT      RADIATION RIGHT      ROTATOR CUFF REPAIR      1993    TOTAL KNEE REPLACEMENT Right 2010    TOTAL KNEE REPLACEMENT Left 07/02/2015    TRANSORAL INCISIONLESS FUNDOPLICATION - INTERNAL  01/25/2012 Fredy fundoplication at [facility] Dr. Fournier.    Fredy fundoplication at [facility] Dr. Fournier.    TRIGGER FINGER RELEASE      left hand  2005    YAG CAPSULOTOMY - OU - BOTH EYES Bilateral 09/2021    done in [place]       Medications Prior to Admission    Medication Sig Dispense Refill Last Dose    LORATADINE 10 MG Oral Tab TAKE 1 TABLET BY MOUTH EVERY DAY 90 tablet 1 2/23/2024 at 0800    glipiZIDE 5 MG Oral Tab Take 1.5 tablets (7.5 mg total) by mouth daily with breakfast.   2/23/2024 at 0800    Accu-Chek FastClix Lancets Does not apply Misc Check sugar once daily as directed (E11.42) 100 each 2 Past Week    hydroxychloroquine 200 MG Oral Tab Take 1 tablet (200 mg total) by mouth daily. 90 tablet 1 2/22/2024 at 2200    NIFEdipine ER 60 MG Oral Tablet 24 Hr Take 1 tablet (60 mg total) by mouth daily. 30 tablet 1 2/23/2024 at 0800    Glucose Blood (ACCU-CHEK GUIDE) In Vitro Strip Check blood sugar twice a day (fasting and before dinner). DX: E11.42, NIDDM 200 strip 1 2/23/2024    rosuvastatin (CRESTOR) 20 MG Oral Tab Take 1 tablet (20 mg total) by mouth nightly. 90 tablet 0 2/22/2024 at 2200    omeprazole 20 MG Oral Capsule Delayed Release Take 1 capsule (20 mg total) by mouth every morning. 90 capsule 1 2/23/2024 at 0800    cyclobenzaprine 10 MG Oral Tab Take 1 tablet (10 mg total) by mouth nightly as needed for Muscle spasms. 90 tablet 1 Past Week    glycopyrrolate 1 MG Oral Tab Take 1 tablet (1 mg total) by mouth 3 (three) times daily. 90 tablet 1 2/23/2024 at 0800    albuterol 108 (90 Base) MCG/ACT Inhalation Aero Soln inhale 2 puff by inhalation route  every 4 - 6 hours as needed 1 each 0 2/23/2024 at 0900    predniSONE 5 MG Oral Tab Take 1 tablet (5 mg total) by mouth daily. 90 tablet 1 2/23/2024 at 0800    montelukast 10 MG Oral Tab Take 1 tablet (10 mg total) by mouth nightly. 90 tablet 3 2/22/2024 at 2200    carvedilol 25 MG Oral Tab Take 1 tablet (25 mg total) by mouth 2 (two) times daily with meals. 180 tablet 3 2/23/2024 at 0800    valsartan 80 MG Oral Tab Take 1 tablet (80 mg total) by mouth daily. 90 tablet 3 2/23/2024 at 0800    spironolactone 25 MG Oral Tab Take 2 tablets (50 mg total) by mouth daily. 180 tablet 1 2/23/2024 at 0800     fluticasone-salmeterol (WIXELA INHUB) 250-50 MCG/ACT Inhalation Aerosol Powder, Breath Activated Inhale 1 puff into the lungs every 12 (twelve) hours. 1 each 5 Past Week    albuterol 108 (90 Base) MCG/ACT Inhalation Aero Soln inhale 2 puff by inhalation route  every 4 - 6 hours as needed 1 each 5 2/23/2024 at 0900    Blood Glucose Monitoring Suppl (ONETOUCH VERIO) w/Device Does not apply Kit 1 each daily. Test 1x daily 1 kit 0 Past Week    OneTouch Delica Lancets 33G Does not apply Misc 4 x daily 100 each 1 Past Week    Blood Glucose Monitoring Suppl (BLOOD GLUCOSE SYSTEM GILLES) Does not apply Kit DX E 11.9.  Checks every morning. 1 kit 0 Past Week    Lancets Does not apply Misc DX E 11.9.  Checks every morning. 50 each 11 Past Week    Blood Glucose Monitoring Suppl w/Device Does not apply Kit DX E 11.9.  Checks every morning. 1 kit 0 Past Week    Blood Gluc Meter Disp-Strips Does not apply Device DX E 11.9.  Checks every morning. 100 each 11 Past Week    acetaminophen 500 MG Oral Tab Take 1 tablet (500 mg total) by mouth every 6 (six) hours as needed for Pain.   2/23/2024 at 0900    aspirin 81 MG Oral Tab EC Take 325 mg by mouth daily.   2/23/2024 at 0900    Glucose Blood (ONETOUCH VERIO) In Vitro Strip Test 1x daily 100 strip 1      Current Facility-Administered Medications Ordered in Epic   Medication Dose Route Frequency Provider Last Rate Last Admin    cefTRIAXone (Rocephin) 1 g in D5W 100 mL IVPB-ADD  1 g Intravenous Q24H Alana Flores  mL/hr at 02/27/24 1054 1 g at 02/27/24 1054    [Held by provider] rivaroxaban (Xarelto) tab 15 mg  15 mg Oral BID with meals Zoila Mcqueen MD   15 mg at 02/25/24 1728    traMADol (Ultram) tab 50 mg  50 mg Oral Q12H PRN Alejandra Palomino MD   50 mg at 02/25/24 2047    glucose (Dex4) 15 GM/59ML oral liquid 15 g  15 g Oral Q15 Min PRN Alejandra Palomino MD        Or    glucose (Glutose) 40% oral gel 15 g  15 g Oral Q15 Min PRN Alejandra Palomino MD        Or     glucose-vitamin C (Dex-4) chewable tab 4 tablet  4 tablet Oral Q15 Min PRN Alejandra Palomino MD        Or    dextrose 50% injection 50 mL  50 mL Intravenous Q15 Min PRN Alejandra Palomino MD        Or    glucose (Dex4) 15 GM/59ML oral liquid 30 g  30 g Oral Q15 Min PRN Alejandra Palomino MD        Or    glucose (Glutose) 40% oral gel 30 g  30 g Oral Q15 Min PRN Alejandra Palomino MD        Or    glucose-vitamin C (Dex-4) chewable tab 8 tablet  8 tablet Oral Q15 Min PRN Alejandra Palomino MD        insulin aspart (NovoLOG) 100 Units/mL FlexPen 1-7 Units  1-7 Units Subcutaneous TID CC Alejandra Palomino MD   1 Units at 02/27/24 0913    cyclobenzaprine (Flexeril) tab 10 mg  10 mg Oral Nightly Alejandra Palomino MD   10 mg at 02/26/24 2050    acetaminophen (Tylenol Extra Strength) tab 500 mg  500 mg Oral Q6H PRN Alejandra Palomino MD   500 mg at 02/23/24 2324    albuterol (Ventolin HFA) 108 (90 Base) MCG/ACT inhaler 2 puff  2 puff Inhalation Q4H PRN Alejandra Palomino MD   2 puff at 02/25/24 1754    carvedilol (Coreg) tab 25 mg  25 mg Oral BID with meals Alejandra Palomino MD   25 mg at 02/27/24 0911    fluticasone furoate-vilanterol (Breo Ellipta) 100-25 MCG/ACT inhaler 1 puff  1 puff Inhalation Daily Chapin Parker MD   1 puff at 02/27/24 0913    glycopyrrolate (Robinul) tab 1 mg  1 mg Oral TID Alejandra Palomino MD   1 mg at 02/27/24 0910    hydroxychloroquine (Plaquenil) tab 200 mg  200 mg Oral Daily Alejandra Palomino MD   200 mg at 02/26/24 2051    cetirizine (ZyrTEC) tab 5 mg  5 mg Oral Daily Alejandra Palomino MD   5 mg at 02/27/24 0910    montelukast (Singulair) tab 10 mg  10 mg Oral Nightly Alejandra Palomino MD   10 mg at 02/26/24 2052    NIFEdipine ER (Procardia-XL) 24 hr tab 60 mg  60 mg Oral Daily Alejandra Palomino MD   60 mg at 02/27/24 0914    pantoprazole (Protonix) DR tab 20 mg  20 mg Oral QAM AC Alejandra Palomino MD   20 mg at 02/26/24 0513    potassium chloride (K-Dur) tab 20 mEq  20 mEq Oral BID Georgette  MD Alejandra   20 mEq at 02/27/24 0911    predniSONE (Deltasone) tab 5 mg  5 mg Oral Daily Alejandra Palomino MD   5 mg at 02/27/24 0911    rosuvastatin (Crestor) tab 10 mg  10 mg Oral Nightly Alejandra Palomino MD   10 mg at 02/26/24 2117    spironolactone (Aldactone) tab 50 mg  50 mg Oral Daily Alejandra Palomino MD   50 mg at 02/27/24 0911    torsemide (Demadex) tab 20 mg  20 mg Oral Daily Alejandra Palomino MD   20 mg at 02/27/24 0911    losartan (Cozaar) tab 50 mg  50 mg Oral Daily Alejandra Palomino MD   50 mg at 02/27/24 0910    melatonin tab 3 mg  3 mg Oral Nightly PRN Alejandra Palomino MD        polyethylene glycol (PEG 3350) (Miralax) 17 g oral packet 17 g  17 g Oral Daily PRN Alejandra Palomino MD        sennosides (Senokot) tab 17.2 mg  17.2 mg Oral Nightly PRN Alejandra Palomino MD        bisacodyl (Dulcolax) 10 MG rectal suppository 10 mg  10 mg Rectal Daily PRN Alejandra Palomino MD        ondansetron (Zofran) 4 MG/2ML injection 4 mg  4 mg Intravenous Q6H PRN Alejandra Palomino MD        metoclopramide (Reglan) 5 mg/mL injection 5 mg  5 mg Intravenous Q8H PRN Alejandra Palomino MD        benzonatate (Tessalon) cap 200 mg  200 mg Oral TID PRN Alejandra Palomino MD        guaiFENesin (Robitussin) 100 MG/5 ML oral liquid 200 mg  200 mg Oral Q4H PRN Alejandra Palomino MD        glycerin-hypromellose- (Artifical Tears) 0.2-0.2-1 % ophthalmic solution 1 drop  1 drop Both Eyes QID PRN Alejandra Palomino MD        sodium chloride (Saline Mist) 0.65 % nasal solution 1 spray  1 spray Each Nare Q3H PRN Alejandra Palomino MD         No current Epic-ordered outpatient medications on file.       Allergies   Allergen Reactions    Celebrex [Celecoxib] PALPITATIONS    Penicillins ITCHING and SWELLING    Metformin And Related UNKNOWN    Olmesartan UNKNOWN       Family History   Problem Relation Age of Onset    Heart Surgery Mother         Leaky valve at 39 y/o.     Prostate Cancer Brother     Cancer Brother     Diabetes  Maternal Grandmother     Diabetes Paternal Aunt     Breast Cancer Self 78    Breast Cancer Niece         before 50    Macular degeneration Neg     Glaucoma Neg      Social History     Socioeconomic History    Marital status:    Tobacco Use    Smoking status: Former     Packs/day: .25     Types: Cigarettes     Quit date:      Years since quittin.    Smokeless tobacco: Never    Tobacco comments:     [id number]   Vaping Use    Vaping Use: Never used   Substance and Sexual Activity    Alcohol use: Never    Drug use: Never       Available pre-op labs reviewed.  Lab Results   Component Value Date    WBC 13.8 (H) 2024    RBC 3.41 (L) 2024    HGB 10.2 (L) 2024    HCT 30.5 (L) 2024    MCV 89.4 2024    MCH 29.9 2024    MCHC 33.4 2024    RDW 17.5 (H) 2024    .0 2024     Lab Results   Component Value Date     2024    K 4.5 2024    K 4.5 2024     2024    CO2 24.0 2024    BUN 21 2024    CREATSERUM 1.05 (H) 2024     (H) 2024    PGLU 178 (H) 2024    CA 9.2 2024          Vital Signs:  Body mass index is 25.09 kg/m².   height is 1.549 m (5' 1\") and weight is 60.2 kg (132 lb 12.8 oz). Her oral temperature is 98.6 °F (37 °C). Her blood pressure is 132/59 and her pulse is 91. Her respiration is 25 and oxygen saturation is 95%.   Vitals:    24 0551 24 0650 24 0851 24 1242   BP: 120/54  117/70 132/59   Pulse: 94  95 91   Resp: 16  17 25   Temp: 99.1 °F (37.3 °C)  98.6 °F (37 °C)    TempSrc: Oral  Oral    SpO2: 93%  93% 95%   Weight:  60.2 kg (132 lb 12.8 oz)  60.2 kg (132 lb 12.8 oz)   Height:    1.549 m (5' 1\")        Anesthesia Evaluation     Patient summary reviewed and Nursing notes reviewed    Airway   Mallampati: II  TM distance: >3 FB  Neck ROM: full  Dental - Dentition appears grossly intact     Pulmonary - normal exam   (+) COPD    ROS comment: CT Acute  pulm. Embolism without cor pulmonale   Negative lower extremity ultrasound for clot  Cardiovascular - normal exam  (+) hypertension    ROS comment: Hx Anemia, HLD    Transthoracic Echocardiogram     Name:Chester Bautista     Date: 2024 :  1940 Ht:  (61in)  BP: 121 / 57   MRN:  [id number]    Age:  83years    Wt:  (138lb) HR: 90bpm   Loc:  [facility]       Gndr: F          BSA: 1.61m^2   Sonographer: Laverne     Ordering:    Boston Gong   Consulting:  Boston Gong     ----------------------------------------------------------------------------   History/Indications: Malignant hypertension.     ----------------------------------------------------------------------------   Procedure information:  A transthoracic complete 2D study was performed.   Additional evaluation included M-mode, complete spectral Doppler, and color   Doppler.  Patient status:  Inpatient.  Location:  Bedside.    This was a   routine study. Transthoracic echocardiography for diagnosis and ventricular   function evaluation. Image quality was adequate.     ECG rhythm:   Normal sinus     ----------------------------------------------------------------------------     Conclusions:     1. Left ventricle: The cavity size was normal. Wall thickness was mildly      increased. Systolic function was mildly reduced. The estimated ejection      fraction was 50-55%, by visual assessment. Left ventricular diastolic      function parameters were normal for the patient's age.   2. Right ventricle: Systolic function was reduced.   3. Left atrium: The left atrial volume was normal.   4. Aortic valve: The peak systolic velocity was 1.13m/sec. The mean systolic      gradient was 3mm Hg. The valve area (VTI) was 2.49cm^2. The valve area      (VTI) index was 1.54cm^2/m^2.   5. Pulmonary arteries: Systolic pressure was within the normal range,      estimated to be 32mm Hg.   6. Pericardium, extracardiac: A small pericardial effusion was identified       posterior to the heart.   Impressions:  No previous study was available for comparison.   *     ----------------------------------------------------------------------------   *   Findings:   Left ventricle:  The cavity size was normal. Wall thickness was mildly   increased. Systolic function was mildly reduced. The estimated ejection   fraction was 50-55%, by visual assessment. Left ventricular diastolic   function parameters were normal for the patient's age.   Left atrium:  The left atrial volume was normal.   Right ventricle:  The cavity size was normal. Systolic function was reduced.   Right atrium:  The atrium was normal in size.   Mitral valve:  The annulus was mildly calcified. The leaflets were mildly   thickened. Leaflet separation was normal.  Doppler:  Transvalvular velocity   was within the normal range. There was no evidence for stenosis. There was   trivial regurgitation.    The valve area by pressure half-time was 3.19cm^2.   The valve area index by pressure half-time was 1.98cm^2/m^2.   Aortic valve:   The valve was trileaflet. The leaflets were mildly   thickened. Cusp separation was normal.  Doppler:  Transvalvular velocity was   within the normal range. There was no evidence for stenosis. There was   trivial regurgitation.    The valve area (VTI) was 2.49cm^2. The valve area   (VTI) index was 1.54cm^2/m^2.    The mean systolic gradient was 3mm Hg. The   peak systolic gradient was 5mm Hg.   Tricuspid valve:  The valve is structurally normal. Leaflet separation was   normal.  Doppler:  Transvalvular velocity was within the normal range. There   was no evidence for stenosis. There was trivial regurgitation.   Pulmonic valve:   The valve is structurally normal. Cusp separation was   normal.  Doppler:  Transvalvular velocity was within the normal range. There   was no evidence for stenosis. There was trivial regurgitation.   Pericardium:  A small pericardial effusion was identified posterior to the    heart.   Aorta:   Aortic root: The aortic root was normal.   Ascending aorta: The ascending aorta was normal.   Pulmonary arteries:   Systolic pressure was within the normal range, estimated to be 32mm Hg.   Systemic veins:  Central venous respirophasic diameter changes are in the   normal range (>50%).   Inferior vena cava: The IVC was normal-sized.       Neuro/Psych    (+)  neuromuscular disease, anxiety/panic attacks,        Comments: Rheumatoid arthritis    GI/Hepatic/Renal      Comments: Hx of duodenal AvMs cauterized in past.  Hx wes fundoplication 1/25/2012  anemia    Endo/Other    (+) diabetes mellitus type 2, arthritis  Abdominal  - normal exam                 Anesthesia Plan:   ASA:  3  Plan:   General  Informed Consent Plan and Risks Discussed With:  Patient  Discussed plan with:  CRNA, attending and surgeon      I have informed Chester Bautista and/or legal guardian or family member of the nature of the anesthetic plan, benefits, risks including possible dental damage if relevant, major complications, and any alternative forms of anesthetic management.   All of the patient's questions were answered to the best of my ability. The patient desires the anesthetic management as planned.  Tsering Batres CRNA  2/27/2024 12:51 PM  Present on Admission:   Type 2 diabetes mellitus without retinopathy (HCC)   Hypertension   Chronic obstructive pulmonary disease, unspecified (HCC)

## 2024-02-27 NOTE — PLAN OF CARE
Problem: RISK FOR INFECTION - ADULT  Goal: Absence of fever/infection during anticipated neutropenic period  Description: INTERVENTIONS  - Monitor WBC  - Administer growth factors as ordered  - Implement neutropenic guidelines  Outcome: Progressing     Problem: RESPIRATORY - ADULT  Goal: Achieves optimal ventilation and oxygenation  Description: INTERVENTIONS:  - Assess for changes in respiratory status  - Assess for changes in mentation and behavior  - Position to facilitate oxygenation and minimize respiratory effort  - Oxygen supplementation based on oxygen saturation or ABGs  - Provide Smoking Cessation handout, if applicable  - Encourage broncho-pulmonary hygiene including cough, deep breathe, Incentive Spirometry  - Assess the need for suctioning and perform as needed  - Assess and instruct to report SOB or any respiratory difficulty  - Respiratory Therapy support as indicated  - Manage/alleviate anxiety  - Monitor for signs/symptoms of CO2 retention  Outcome: Progressing     Problem: CARDIOVASCULAR - ADULT  Goal: Maintains optimal cardiac output and hemodynamic stability  Description: INTERVENTIONS:  - Monitor vital signs, rhythm, and trends  - Monitor for bleeding, hypotension and signs of decreased cardiac output  - Evaluate effectiveness of vasoactive medications to optimize hemodynamic stability  - Monitor arterial and/or venous puncture sites for bleeding and/or hematoma  - Assess quality of pulses, skin color and temperature  - Assess for signs of decreased coronary artery perfusion - ex. Angina  - Evaluate fluid balance, assess for edema, trend weights  Outcome: Progressing  Patient remains on 2.5L O2 per NC. Denies SOB. Transfused 1U PRBC for Hgb 6.8. Post transfusion hgb 9.8. Xarelto held, and heparin drip restarted at 7mls/hr. Heparin drip to be stopped tomorrow at 7am. No bleeding noted or reported by pt. Patient to be NPO post midnight for EGD.   Family at the bedside.

## 2024-02-27 NOTE — ANESTHESIA POSTPROCEDURE EVALUATION
Patient: Chester Bautista    Procedure Summary       Date: 02/27/24 Room / Location: [facility] ENDOSCOPY 01 / [facility] ENDOSCOPY    Anesthesia Start: 1327 Anesthesia Stop: 1358    Procedure: ESOPHAGOGASTRODUODENOSCOPY (EGD) with PUSH ENTEROSCOPY Diagnosis: (gastric avms)    Surgeons: Zoila Mcqueen MD Anesthesiologist: Tsering Batres CRNA    Anesthesia Type: general ASA Status: 3            Anesthesia Type: general    Vitals Value Taken Time   /55 02/27/24 1357   Temp 98.4 °F (36.9 °C) 02/27/24 1357   Pulse 88 02/27/24 1357   Resp 16 02/27/24 1357   SpO2 100 % 02/27/24 1357       [facility] AN Post Evaluation:   Patient Evaluated in PACU  Patient Participation: complete - patient participated  Level of Consciousness: awake and awake and alert  Pain Score: 0  Pain Management: adequate  Airway Patency:patent  Dental exam unchanged from preop  Yes    Cardiovascular Status: acceptable  Respiratory Status: acceptable  Postoperative Hydration acceptable      Tsering Batres CRNA  2/27/2024 1:59 PM

## 2024-02-27 NOTE — PAYOR COMM NOTE
--------------  ADMISSION REVIEW     Payor: TRINO ARREGUIN [facility] [place]  Subscriber #:  [id number]  Authorization Number: [id number]    Admit date: 2/23/24  Admit time:  6:34 PM       REVIEW DOCUMENTATION:     ED Provider Notes        ED Provider Notes signed by Alex Álvarez MD at 2/23/2024  5:41 PM       Author: Alex Álvarez MD Service: -- Author Type: Physician    Filed: 2/23/2024  5:41 PM Date of Service: 2/23/2024  2:28 PM Status: Signed    : Alex Álvarez MD (Physician)           Patient Seen in: [facility] Emergency Department      History     Chief Complaint   Patient presents with    Difficulty Breathing     Stated Complaint: Sob    Subjective:   HPI    Patient is an 84-year-old female with a history of COPD hypertension high blood pressure who was hospitalized about 2 weeks ago for shortness of breath.  Chest x-ray showed vague opacities consistent with multifocal pneumonia versus heart failure cardiology was consulted consulted she was discharged shortly after admission on antibiotics.  Since that time she continues to have shortness of breath with any exertion.  She been undergoing therapy and whenever she exerts herself her O2 sats go into the 80s.  Today she went for an iron infusion and walking back to the car her oxygen levels went into the low 80s.  She feels quite short of breath during these times.  Gets better when she rests.  No chest pain.    Objective:   Past Medical History:   Diagnosis Date    Anxiety state     Cancer (HCC)     breast cancer 2018    Chronic obstructive pulmonary disease, unspecified (HCC) 05/04/2023    Chronic obstructive pulmonary disease, unspecified (HCC) 05/04/2023    COPD (chronic obstructive pulmonary disease) (HCC)     Diabetes (HCC)     Essential hypertension     Exposure to medical diagnostic radiation     High blood pressure     High cholesterol     History of blood transfusion 07/2022    low hemoglobin    Osteoarthritis     PONV (postoperative nausea and vomiting)      Pulmonary emphysema (HCC)     Rheumatoid arthritis (HCC)               Past Surgical History:   Procedure Laterality Date    CATARACT EXTRACTION W/  INTRAOCULAR LENS IMPLANT Bilateral 2017    Dr in [facility]     COLONOSCOPY      COLONOSCOPY N/A 2022    Procedure: COLONOSCOPY;  Surgeon: Mikey Garcia MD;  Location: [facility] ENDOSCOPY    COLONOSCOPY N/A 06/15/2023    Procedure: COLONOSCOPY;  Surgeon: Mikey Garcia MD;  Location: [facility] ENDOSCOPY    CORRECT BUNION,SIMPLE      right foot      EGD  2023    Dr. Garcia; Duodenal AVM's x4 cauterized    HYSTERECTOMY      , no abnormal Paps, due to heavy bleeding with fibroids.  Not sure if it had bilateral salpingo-oophorectomy.    IR ANEURYSM REPAIRM      Computer assisted volumetric craniofomy with clipping of anterior cerebral artery.    LUMPECTOMY RIGHT      RADIATION RIGHT      ROTATOR CUFF REPAIR          TOTAL KNEE REPLACEMENT Right     TOTAL KNEE REPLACEMENT Left 2015    TRANSORAL INCISIONLESS FUNDOPLICATION - INTERNAL  2012 Fredy fundoplication at [facility] Dr. Fournier.    Fredy fundoplication at [facility] Dr. Fournier.    TRIGGER FINGER RELEASE      left hand      YAG CAPSULOTOMY - OU - BOTH EYES Bilateral 2021    done in [place]                Social History     Socioeconomic History    Marital status:    Tobacco Use    Smoking status: Former     Packs/day: .25     Types: Cigarettes     Quit date:      Years since quittin.1    Smokeless tobacco: Never    Tobacco comments:     [id number]   Vaping Use    Vaping Use: Never used   Substance and Sexual Activity    Alcohol use: Never    Drug use: Never   Social History Narrative    Just moved in from [place].   passed away and that is why moved from [place] to be with daughter who lives in [place].  Currently lives with daughter.     Social Determinants of Health     Financial Resource Strain: Low Risk   (2/6/2024)    Financial Resource Strain     Difficulty of Paying Living Expenses: Not hard at all     Med Affordability: No   Food Insecurity: No Food Insecurity (2/2/2024)    Food Insecurity     Food Insecurity: Never true   Transportation Needs: No Transportation Needs (2/2/2024)    Transportation Needs     Lack of Transportation: No   Housing Stability: Low Risk  (2/2/2024)    Housing Stability     Housing Instability: No              Review of Systems    Positive for stated complaint: Sob  Other systems are as noted in HPI.  Constitutional and vital signs reviewed.      All other systems reviewed and negative except as noted above.    Physical Exam     ED Triage Vitals [02/23/24 1228]   /52   Pulse 86   Resp 18   Temp 98.6 °F (37 °C)   Temp src Temporal   SpO2 (!) 85 %   O2 Device None (Room air)       Current:/59   Pulse 90   Temp 98.6 °F (37 °C) (Temporal)   Resp 19   SpO2 94%         Physical Exam    Constitutional: Oriented to person, place, and time. Appears well-developed and well-nourished.   HEENT:   Head: Normocephalic and atraumatic.   Right Ear: External ear normal.   Left Ear: External ear normal.   Nose: Nose normal.   Mouth/Throat: Oropharynx is clear and moist.   Eyes: Conjunctivae and EOM are normal. Pupils are equal, round, and reactive to light.   Neck: Neck supple.   Cardiovascular: Normal rate, regular rhythm, normal heart sounds and intact distal pulses.    Pulmonary/Chest: Effort normal and breath sounds normal. No respiratory distress.   Abdominal: Soft. Bowel sounds are normal. Exhibits no distension and no mass. There is no tenderness. There is no rebound and no guarding.   Musculoskeletal: Normal range of motion. Exhibits no edema or tenderness.   Lymphadenopathy: No cervical adenopathy.   Neurological: Alert and oriented to person, place, and time. Normal reflexes. No cranial nerve deficit. No motor os sensory defecits noted Coordination normal.   Skin: Skin is warm and  dry.   Psychiatric: Normal mood and affect. Behavior is normal. Judgment and thought content normal.   Nursing note and vitals reviewed.        ED Course     Labs Reviewed   COMP METABOLIC PANEL (14) - Abnormal; Notable for the following components:       Result Value    Glucose 184 (*)     BUN 30 (*)     Creatinine 1.34 (*)     BUN/CREA Ratio 22.4 (*)     eGFR-Cr 39 (*)     ALT 8 (*)     All other components within normal limits   D-DIMER - Abnormal; Notable for the following components:    D-Dimer 1.60 (*)     All other components within normal limits   CBC W/ DIFFERENTIAL - Abnormal; Notable for the following components:    WBC 19.0 (*)     RBC 2.78 (*)     HGB 8.0 (*)     HCT 24.7 (*)     RDW-SD 53.2 (*)     RDW 16.8 (*)     Neutrophil Absolute Prelim 16.51 (*)     Neutrophil Absolute 16.51 (*)     All other components within normal limits   TROPONIN I HIGH SENSITIVITY - Normal   BNP (B TYPE NATRIURETIC PEPTIDE) - Normal   CBC WITH DIFFERENTIAL WITH PLATELET    Narrative:     The following orders were created for panel order CBC With Differential With Platelet.  Procedure                               Abnormality         Status                     ---------                               -----------         ------                     CBC W/ DIFFERENTIAL[[id number]]          Abnormal            Final result                 Please view results for these tests on the individual orders.   PTT, ACTIVATED   RAINBOW DRAW BLUE     EKG    Rate, intervals and axes as noted on EKG Report.  Rate: 82  Rhythm: Sinus Rhythm  Reading: Sinus rhythm nonspecific T changes                         MDM      Use of independent historian:     I personally reviewed and interpreted the images :     CT CHEST PE AORTA (IV ONLY) (CPT=71260)    Result Date: 2/23/2024  CONCLUSION:   Acute pulmonary embolus extending from the right lower lobar pulmonary artery to to multiple segmental arteries.  Overall low clot burden.  No CT evidence of right  heart strain.  Bilateral upper lobe fibrosis and cystic changes.  Suspected superimposed infection in the left upper lobe.  These findings were communicated with Dr. Alex Álvarez by Dr. Awais Munson at 1719 hours using telephone on 02/23/2024.      [facility]   Dictated by (CST): Awais Munson MD on 2/23/2024 at 5:07 PM     Finalized by (CST): Awais Munson MD on 2/23/2024 at 5:19 PM          XR CHEST AP PORTABLE  (CPT=71045)    Result Date: 2/23/2024  PROCEDURE: XR CHEST AP PORTABLE  (CPT=71045) TIME: 1331  COMPARISON: [facility], XR CHEST AP PORTABLE (CPT=71045), 2/03/2024, 2:28 PM.  INDICATIONS: Shortness of breath for one week.  TECHNIQUE:   Single view.   Findings and impression:  Normal heart size.  No edema  The distal 1.5 centimeter portion of the renea catheter remains malposition in the azygos vein   Normal lung volumes  Decreased mild ill-defined bilateral opacities involving the mid to lower lungs  No new pulmonary opacity  No pneumothorax or pleural effusion   Dictated by (CST): lAec Davis MD on 2/23/2024 at 1:46 PM     Finalized by (CST): Alec Davis MD on 2/23/2024 at 1:47 PM           Vitals:    02/23/24 1455 02/23/24 1600 02/23/24 1630 02/23/24 1730   BP:  120/58 119/60 125/59   Pulse:  88 86 90   Resp:  23 20 19   Temp:       TempSrc:       SpO2: 98% 93% 94% 94%     *I personally reviewed and interpreted all ED vitals.    Pulse Ox: 96%, Room air, Normal     EKG interpretation above independently interpreted by me    Monitor Interpretation:   normal sinus rhythm independently interpreted by me    Differential Diagnosis/ Diagnostic Considerations: Exertional dyspnea and hypoxia consider heart failure consider pneumonia consider PE consider COPD exacerbation    Medical Record Review: I personally reviewed available prior medical records for any recent pertinent discharge summaries, testing, and procedures and reviewed those reports and found admission from 2/5/2024 H&P and ER visit notes  reviewed.  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  She has chronic iron deficiency    Complicating Factors: The patient already has history of COPD which contribute to the complexity of this ED evaluation.    Social determinants of health:    Prescription drug management:      Shared Decision Making:    ED Course: D-dimer is elevated can we will check CT for PE    Discussion of management with other healthcare providers: I spoke with  will admit pulmonary consulted will start heparin drip.    Condition upon leaving the department: Stable    Admission disposition: 2/23/2024  5:40 PM             I spent a total of 45 minutes of critical care time in obtaining history, performing a physical exam, bedside monitoring of interventions, collecting and interpreting tests and discussion with consultants but not including time spent performing procedures.                             Medical Decision Making      Disposition and Plan     Clinical Impression:  1. Other pulmonary embolism without acute cor pulmonale, unspecified chronicity (HCC)         Disposition:  Admit  2/23/2024  5:40 pm    Follow-up:  No follow-up provider specified.  We recommend that you schedule follow up care with a primary care provider within the next three months to obtain basic health screening including reassessment of your blood pressure.      Medications Prescribed:  Current Discharge Medication List        START taking these medications    Details   LORATADINE 10 MG Oral Tab TAKE 1 TABLET BY MOUTH EVERY DAY  Qty: 90 tablet, Refills: 1                               Hospital Problems       Present on Admission  Date Reviewed: 2/18/2024            ICD-10-CM Noted POA    * (Principal) Other pulmonary embolism without acute cor pulmonale, unspecified chronicity (HCC) I26.99 2/23/2024 Unknown                      Signed by Alex Álvarez MD on 2/23/2024  5:41 PM       History & Physical   Assessment/Plan:        Acute pulmonary embolism without acute cor pulmonale  -Patient with history of rheumatoid arthritis, history of breast cancer and recent hospitalization  -Bilateral lower extremity Doppler negative for DVT  -On heparin drip  -pulmonology on board.  They recommend continuing IV heparin for today and not transitioning to NOAC yet due to decrease in her hemoglobin as they want to make sure she is not actively bleeding          Type 2 diabetes mellitus without retinopathy (HCC)  -SSI       Hypertension  -Continue home meds       Chronic obstructive pulmonary disease, unspecified (HCC)  -Continue inhalers-patient is supposed to be on hydroxychloroquine     Cloudy urine  -Patient reported that her urine was foul-smelling and cloudy and that usually this happens with her whenever she gets a UTI  -Urinalysis points towards an infection.  Will start nitrofurantoin and wait for culture results to determine whether she should continue antibiotics or not        Rheumatoid arthritis (HCC)  -Patient supposed to be on hydroxychloroquine and prednisone.  She declined hydroxychloroquine saying that it makes her face swell.  While discussing this in the presence of her daughter, her daughter informs me  and reminds the patient that it was leflunomide that gave her the side effects and that the patient should continue her hydroxychloroquine.  Patient will think about it and let us know if she wants us to restart hydroxychloroquine.  For now we will continue on daily prednisone     HLD  -Continue statin        GERD  -Continue pantoprazole     Assessment/Plan:   1.  Acute venous thromboembolism-the patient was recently in hospital and this may be the main risk factor.  She also has a prior history of breast cancer.  Would use IV heparin initially and make sure that there is no evidence of bleeding as there has been concern with  GI bleeding with AVMs.     Recommendations:  1.  Extremity venous ultrasound  2.  Will use IV heparin for now  3.  Would not consider tPA in this patient with prior history of GI bleeding and a low burden of clot without major central obstruction.  Also, she is not requiring oxygen and there is no hemodynamic instability.  4.  Morning CBC     2.  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  There also was question of prior history of sarcoidosis.  There is leukocytosis at present.  She carries prior diagnosis pneumonia recently.     Recommendations: Patient should get a follow-up CT scan of the chest at the 6 to 8-week interval.  2/25  Assessment and Plan:   1.  Acute venous thromboembolism-the patient was recently in hospital and this may be the main risk factor.  She also has a prior history of breast cancer.  Would use IV heparin initially and make sure that there is no evidence of bleeding as there has been concern with GI bleeding with AVMs.  The lower extremity venous ultrasound is negative for clot.  Breathing better.     The hemoglobin is stable over the last 2 days.  Okay to transition to Xarelto.     Recommendations:  1.  Transition to Xarelto.  2.  Morning CBC     2.  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  There also was question of prior history of sarcoidosis.  There is leukocytosis at present.  She carries prior diagnosis pneumonia recently.     Recommendations: Patient should get a follow-up CT scan of the chest at the 6 to 8-week interval.  Assessment and Plan:        Acute pulmonary embolism without acute cor pulmonale  -Patient with history of rheumatoid arthritis, history of breast cancer and recent hospitalization  -Bilateral lower extremity Doppler negative  for DVT  - was started on heparin drip.  Switch to Xarelto today by pulmonology.  Plan to monitor for bleeding and check a CBC tomorrow.  -Wean her off of oxygen as tolerated.       Type 2 diabetes mellitus without retinopathy (HCC)  -SSI       Hypertension  -Continue home meds       Chronic obstructive pulmonary disease, unspecified (HCC)  -Continue inhalers-patient is supposed to be on hydroxychloroquine     Cloudy urine  -Patient reported that her urine was foul-smelling and cloudy and that usually this happens with her whenever she gets a UTI  -Urinalysis points towards an infection.  Started nitrofurantoin and wait for culture results to determine whether she should continue antibiotics or not        Rheumatoid arthritis (HCC)  -Daily prednisone and hydroxychloroquine     HLD  -Continue statin        GERD  -Continue pantoprazole  2/26  Assessment & Plan:   Acute pulmonary embolism without acute cor pulmonale  -Bilateral lower extremity Doppler negative for DVT  .  Hemoglobin dropped status post 1 units of PRBCs she has previous history of AVM, will hold anticoagulation, GI consult called             Type 2 diabetes mellitus without retinopathy (HCC)  With sliding scale insulin,   hypoglycemia protocol    Hypertension  -Continue home meds       Chronic obstructive pulmonary disease, unspecified (HCC)  -Continue inhalers-patient is supposed to be on hydroxychloroquine     Cloudy urine  UA shows Klebsiella, pending official culture and sensitivity continue with current antibiotics for now     Rheumatoid arthritis (HCC)  -Daily prednisone and hydroxychloroquine     HLD  -Continue statin        GERD  -Continue pantoprazole  Assessment & Plan:   Acute pulmonary embolism  CTA chest with RLL PE, low clot burden and no major central obstruction  No CT evidence of RH strain  LE Doppler US no DVT  Recent hospitalization and this may be main risk factor  History of breast cancer  Started on IV heparin -> transition to  Xarelto  Xarelto now on hold for acute on chronic anemia  Hemodynamically stable, requiring 2.5 L O2  Plan:  -O2 and wean as tolerated  -Xarelto on hold for anemia  -Hold anticoagulation given GI bleeding, low clot burden  -SCDs     Acute on chronic anemia  GI bleeding  History of GI bleeding with AVMs  Plan:  -Transfuse 1 unit pRBC  -GI evaluation     Abnormal CT chest  History of scarring, bronchiectasis, pneumatoceles, recent pneumonia, and question of prior history of sarcoidosis  Plan:  -Follow-up CT chest in 6-8 weeks  Assessment & Plan   Chester Bautista is a a(n) 84 year old female w/ a history of breast cancer s/p lumpectomy and radiation, rheumatoid arthritis, COPD, HTN, HLD, DM, who presented with shortness of breath found to have new PE started on XARELTO this admission. GI consulted for acute on chronic anemia. Hgb previously 10's earlier this month, now drifted down to 6.8 this morning.  EGD and colonoscopy 6/2023 with duodenal avm x 3, gastric avm x 1, and cecal avm x 1, each were treated. VCE 12/8/2023 with small bowel AVMs with one focal area of bleeding in the proximal duodenum. Push enteroscopy on 12/21 showed 4 small avms in the second portion of duodenum, all treated with APC.         Probable recurrent AVM bleed exacerbated by recent initiation of therapeutic anticoagulation      Recommend:  - transfuse to goal hgb >7  - plan egd with push enteroscopy tomorrow  - give venofer  - hold xarelto if able, can give short acting agent, prior to procedure  - discussed the nature of AVM disease and risk of recurrent bleeds and chronic anemia  2/27  Assessment & Plan:   Acute pulmonary embolism  CTA chest with RLL PE, low clot burden and no major central obstruction  No CT evidence of RH strain  LE Doppler US no DVT  Recent hospitalization may be main risk factor  History of breast cancer  Xarelto on hold for anemia -> back on IV heparin given plans for EGD  Hemodynamically stable, requiring 2.5  L O2  Plan:  -O2 and wean as tolerated  -Hold anticoagulation given plans for EGD today  -SCDs     Acute on chronic anemia  GI bleeding  History of GI bleeding with AVMs  Hemoglobin improved s/p 1 unit pRBC  Plan:  -Follow hemoglobin  -EGD today per GI  -Will follow up after EGD with anticoagulation recommendations     Abnormal CT chest  History of scarring, bronchiectasis, pneumatoceles, recent pneumonia, and question of prior history of sarcoidosis  Plan:  -Follow-up CT chest in 6-8 weeks     UTI  Urine culture with Klebsiella aerogenes  Resistant to nitrofurantoin  Plan:  -Antibiotics per primary service  Assessment & Plan:   Acute pulmonary embolism without acute cor pulmonale  -Bilateral lower extremity Doppler negative for DVT Xarelto she is back on heparin which is held this morning patient is going for EGD        Anemia status post 1 unit PRBC, hemoglobin stable today, history of AVM seen by GI plan for EGD       Type 2 diabetes mellitus without retinopathy (HCC)  With sliding scale insulin,   hypoglycemia protocol    Hypertension  -Continue home meds       Chronic obstructive pulmonary disease, unspecified (HCC)  -Continue inhalers-patient is supposed to be on hydroxychloroquine        UA shows Klebsiella, DC Macrobid started on Rocephin     Rheumatoid arthritis (HCC)  -Daily prednisone and hydroxychloroquine     HLD  -Continue statin        GERD  -Continue pantoprazole  mpression:   1. Gastric AVMs s/p cautery  2. Solid food in stomach obscuring visualization. Procedure aborted, incomplete exam .     Recommend:  1. Plan for relook EGD tomorrow with likely further APC therapy  2. May resume heparin gtt tonight and again stop 6 hours prior to procedure     MEDICATIONS ADMINISTERED IN LAST 1 DAY:  carvedilol (Coreg) tab 25 mg       Date Action Dose Route User    2/27/2024 0911 Given 25 mg Oral Kassandra Quezada RN    2/26/2024 1752 Given 25 mg Oral Nina Joyce, MAGI          cefTRIAXone (Rocephin) 1 g in  D5W 100 mL IVPB-ADD       Date Action Dose Route User    2/27/2024 1054 New Bag 1 g Intravenous Kassandra Quezada RN          cetirizine (ZyrTEC) tab 5 mg       Date Action Dose Route User    2/27/2024 0910 Given 5 mg Oral Kassandra Quezada RN          heparin (Porcine) 17656 units/250mL infusion PE/DVT/THROMBUS CONTINUOUS       Date Action Dose Route User    2/26/2024 2147 Hi-Risk Rate/Dose Change 600 Units/hr Intravenous Tabby Shrestha RN    2/26/2024 2109 Hi-Risk Other 700 Units/hr Intravenous Tabby Shrestha RN          cyclobenzaprine (Flexeril) tab 10 mg       Date Action Dose Route User    2/26/2024 2050 Given 10 mg Oral Tabby Shrestha RN          fluticasone furoate-vilanterol (Breo Ellipta) 100-25 MCG/ACT inhaler 1 puff       Date Action Dose Route User    2/27/2024 0913 Given 1 puff Inhalation Kassandra Quezada RN          glycopyrrolate (Robinul) tab 1 mg       Date Action Dose Route User    2/27/2024 0910 Given 1 mg Oral Kassandra Quezada RN    2/26/2024 2051 Given 1 mg Oral Tabby Shrestha RN    2/26/2024 1752 Given 1 mg Oral Nina Joyce RN          hydroxychloroquine (Plaquenil) tab 200 mg       Date Action Dose Route User    2/26/2024 2051 Given 200 mg Oral Tabby Shrestha RN          heparin (Porcine) 07144 units/250mL infusion (PE/DVT/THROMBUS) INITIAL DOSE       Date Action Dose Route User    2/26/2024 1504 New Bag 700 Units/hr Intravenous Nina Joyce RN          insulin aspart (NovoLOG) 100 Units/mL FlexPen 1-7 Units       Date Action Dose Route User    2/27/2024 0913 Given 1 Units Subcutaneous (Right Upper Arm) Kassandra Quezada RN    2/26/2024 1757 Given 3 Units Subcutaneous (Left Upper Abdomen) Nina Joyce RN          lactated ringers infusion       Date Action Dose Route User    2/27/2024 1327 New Bag (none) Intravenous Tsering Batres CRNA          lidocaine PF (Xylocaine-MPF) 1% injection       Date Action Dose Route User    2/27/2024 6498 Given 30 mg Intravenous  Tsering Batres CRNA          losartan (Cozaar) tab 50 mg       Date Action Dose Route User    2/27/2024 0910 Given 50 mg Oral Kassandra Quezada RN          montelukast (Singulair) tab 10 mg       Date Action Dose Route User    2/26/2024 2052 Given 10 mg Oral Tabby Shrestha RN          NIFEdipine ER (Procardia-XL) 24 hr tab 60 mg       Date Action Dose Route User    2/27/2024 0914 Given 60 mg Oral Kassandra Quezada RN          nitrofurantoin monohydrate macro (Macrobid) cap 100 mg       Date Action Dose Route User    2/27/2024 0910 Given 100 mg Oral Kassandra Quezada RN    2/26/2024 1828 Given 100 mg Oral Nina Joyce RN          potassium chloride (K-Dur) tab 20 mEq       Date Action Dose Route User    2/27/2024 0911 Given 20 mEq Oral Kassandra Quezada RN    2/26/2024 2052 Given 20 mEq Oral Tabby Shrestha RN          predniSONE (Deltasone) tab 5 mg       Date Action Dose Route User    2/27/2024 0911 Given 5 mg Oral Kassandra Quezada RN          propofol (Diprivan) 10 MG/ML injection       Date Action Dose Route User    2/27/2024 1331 Given 50 mg Intravenous Tsering Batres CRNA          propofol (Diprivan) 10 mg/mL infusion premix       Date Action Dose Route User    2/27/2024 1330 New Bag 80 mcg/kg/min × 60.2 kg Intravenous Tsering Batres CRNA          rosuvastatin (Crestor) tab 10 mg       Date Action Dose Route User    2/26/2024 2117 Given 10 mg Oral Tabby Shrestha RN          spironolactone (Aldactone) tab 50 mg       Date Action Dose Route User    2/27/2024 0911 Given 50 mg Oral Kassandra Quezada RN          torsemide (Demadex) tab 20 mg       Date Action Dose Route User    2/27/2024 0911 Given 20 mg Oral Kassandra Quezada RN            Vitals (last day)       Date/Time Temp Pulse Resp BP SpO2 Weight O2 Device O2 Flow Rate (L/min) Who    02/27/24 1357 -- -- -- -- -- -- Nasal cannula 2 L/min EK    02/27/24 0851 98.6 °F (37 °C) 95 17 117/70 93 % -- Nasal cannula 2.5 L/min     02/27/24 0551  99.1 °F (37.3 °C) 94 16 120/54 93 % -- Nasal cannula 2.5 L/min     02/27/24 0107 98.5 °F (36.9 °C) 99 16 128/55 92 % -- Nasal cannula 2.5 L/min     02/26/24 2046 98.6 °F (37 °C) 89 17 136/57 95 % -- Nasal cannula 2.5 L/min     02/26/24 1440 98.3 °F (36.8 °C) 85 16 125/64 92 % -- Nasal cannula 2.5 L/min     02/26/24 1205 97.7 °F (36.5 °C) 80 18 123/62 95 % -- Nasal cannula 2.5 L/min     02/26/24 0857 97.3 °F (36.3 °C) 82 17 112/56 96 % -- Nasal cannula 2.5 L/min     02/26/24 0510 97.8 °F (36.6 °C) 77 18 107/49 93 % 129 lb 12.8 oz Nasal cannula 2.5 L/min AD    02/26/24 0018 98 °F (36.7 °C) 89 18 117/66 91 % -- Nasal cannula 2.5 L/min AD          CIWA Scores (since admission)       None          Blood Transfusion Record       Product Unit Status Volume Start End            Transfuse RBC       24  [id number]  [id number] Completed 02/26/24 1439 350 mL 02/26/24 1151 02/26/24 1439              PLEASE FAX DAYS CERTIFIED AND NEXT REVIEW DATE [phone]

## 2024-02-28 ENCOUNTER — ANESTHESIA EVENT (OUTPATIENT)
Dept: ENDOSCOPY | Facility: HOSPITAL | Age: 84
End: 2024-02-28
Payer: MEDICARE

## 2024-02-28 ENCOUNTER — ANESTHESIA (OUTPATIENT)
Dept: ENDOSCOPY | Facility: HOSPITAL | Age: 84
End: 2024-02-28
Payer: MEDICARE

## 2024-02-28 LAB
ANION GAP SERPL CALC-SCNC: 8 MMOL/L (ref 0–18)
APTT PPP: 69.9 SECONDS (ref 23.3–35.6)
BASOPHILS # BLD AUTO: 0.06 X10(3) UL (ref 0–0.2)
BASOPHILS NFR BLD AUTO: 0.4 %
BUN BLD-MCNC: 31 MG/DL (ref 9–23)
BUN/CREAT SERPL: 23.5 (ref 10–20)
CALCIUM BLD-MCNC: 10.1 MG/DL (ref 8.7–10.4)
CHLORIDE SERPL-SCNC: 105 MMOL/L (ref 98–112)
CO2 SERPL-SCNC: 22 MMOL/L (ref 21–32)
CREAT BLD-MCNC: 1.32 MG/DL
DEPRECATED RDW RBC AUTO: 52.3 FL (ref 35.1–46.3)
EGFRCR SERPLBLD CKD-EPI 2021: 40 ML/MIN/1.73M2 (ref 60–?)
EOSINOPHIL # BLD AUTO: 0.3 X10(3) UL (ref 0–0.7)
EOSINOPHIL NFR BLD AUTO: 2.1 %
ERYTHROCYTE [DISTWIDTH] IN BLOOD BY AUTOMATED COUNT: 17.3 % (ref 11–15)
GLUCOSE BLD-MCNC: 113 MG/DL (ref 70–99)
GLUCOSE BLDC GLUCOMTR-MCNC: 132 MG/DL (ref 70–99)
GLUCOSE BLDC GLUCOMTR-MCNC: 157 MG/DL (ref 70–99)
GLUCOSE BLDC GLUCOMTR-MCNC: 209 MG/DL (ref 70–99)
GLUCOSE BLDC GLUCOMTR-MCNC: 233 MG/DL (ref 70–99)
HCT VFR BLD AUTO: 31 %
HGB BLD-MCNC: 9.9 G/DL
IMM GRANULOCYTES # BLD AUTO: 0.19 X10(3) UL (ref 0–1)
IMM GRANULOCYTES NFR BLD: 1.4 %
LYMPHOCYTES # BLD AUTO: 1.99 X10(3) UL (ref 1–4)
LYMPHOCYTES NFR BLD AUTO: 14.2 %
MCH RBC QN AUTO: 28.5 PG (ref 26–34)
MCHC RBC AUTO-ENTMCNC: 31.9 G/DL (ref 31–37)
MCV RBC AUTO: 89.3 FL
MONOCYTES # BLD AUTO: 1.11 X10(3) UL (ref 0.1–1)
MONOCYTES NFR BLD AUTO: 7.9 %
NEUTROPHILS # BLD AUTO: 10.38 X10 (3) UL (ref 1.5–7.7)
NEUTROPHILS # BLD AUTO: 10.38 X10(3) UL (ref 1.5–7.7)
NEUTROPHILS NFR BLD AUTO: 74 %
OSMOLALITY SERPL CALC.SUM OF ELEC: 287 MOSM/KG (ref 275–295)
PLATELET # BLD AUTO: 370 10(3)UL (ref 150–450)
POTASSIUM SERPL-SCNC: 4.7 MMOL/L (ref 3.5–5.1)
RBC # BLD AUTO: 3.47 X10(6)UL
SODIUM SERPL-SCNC: 135 MMOL/L (ref 136–145)
WBC # BLD AUTO: 14 X10(3) UL (ref 4–11)

## 2024-02-28 PROCEDURE — 99232 SBSQ HOSP IP/OBS MODERATE 35: CPT | Performed by: INTERNAL MEDICINE

## 2024-02-28 PROCEDURE — 99232 SBSQ HOSP IP/OBS MODERATE 35: CPT | Performed by: PHYSICIAN ASSISTANT

## 2024-02-28 PROCEDURE — 0W3P8ZZ CONTROL BLEEDING IN GASTROINTESTINAL TRACT, VIA NATURAL OR ARTIFICIAL OPENING ENDOSCOPIC: ICD-10-PCS | Performed by: INTERNAL MEDICINE

## 2024-02-28 PROCEDURE — 43270 EGD LESION ABLATION: CPT | Performed by: INTERNAL MEDICINE

## 2024-02-28 RX ORDER — SODIUM CHLORIDE, SODIUM LACTATE, POTASSIUM CHLORIDE, CALCIUM CHLORIDE 600; 310; 30; 20 MG/100ML; MG/100ML; MG/100ML; MG/100ML
INJECTION, SOLUTION INTRAVENOUS CONTINUOUS PRN
Status: DISCONTINUED | OUTPATIENT
Start: 2024-02-28 | End: 2024-02-28 | Stop reason: SURG

## 2024-02-28 RX ORDER — LIDOCAINE HYDROCHLORIDE 10 MG/ML
INJECTION, SOLUTION EPIDURAL; INFILTRATION; INTRACAUDAL; PERINEURAL AS NEEDED
Status: DISCONTINUED | OUTPATIENT
Start: 2024-02-28 | End: 2024-02-28 | Stop reason: SURG

## 2024-02-28 RX ORDER — OCTREOTIDE ACETATE 50 UG/ML
50 INJECTION, SOLUTION INTRAVENOUS; SUBCUTANEOUS EVERY 8 HOURS
Status: DISCONTINUED | OUTPATIENT
Start: 2024-02-28 | End: 2024-02-29

## 2024-02-28 RX ORDER — GLYCOPYRROLATE 0.2 MG/ML
INJECTION, SOLUTION INTRAMUSCULAR; INTRAVENOUS AS NEEDED
Status: DISCONTINUED | OUTPATIENT
Start: 2024-02-28 | End: 2024-02-28 | Stop reason: SURG

## 2024-02-28 RX ADMIN — SODIUM CHLORIDE, SODIUM LACTATE, POTASSIUM CHLORIDE, CALCIUM CHLORIDE: 600; 310; 30; 20 INJECTION, SOLUTION INTRAVENOUS at 13:02:00

## 2024-02-28 RX ADMIN — SODIUM CHLORIDE, SODIUM LACTATE, POTASSIUM CHLORIDE, CALCIUM CHLORIDE: 600; 310; 30; 20 INJECTION, SOLUTION INTRAVENOUS at 13:20:00

## 2024-02-28 RX ADMIN — GLYCOPYRROLATE 0.2 MG: 0.2 INJECTION, SOLUTION INTRAMUSCULAR; INTRAVENOUS at 13:04:00

## 2024-02-28 RX ADMIN — LIDOCAINE HYDROCHLORIDE 40 MG: 10 INJECTION, SOLUTION EPIDURAL; INFILTRATION; INTRACAUDAL; PERINEURAL at 13:05:00

## 2024-02-28 NOTE — PROGRESS NOTES
[facility]    Progress Note    Chester Bautista Patient Status:  Inpatient    1940 MRN [id number]   Location [facility]5W Attending Heike Sorto MD   Hosp Day # 5 PCP Heike Sorto MD     Subjective:   Seen and examined this morning. Has cough with yellow phlegm. No shortness of breath. No chest pain or pleurisy. No BM since admission. On 1 L O2. Plans for repeat EGD today.    Objective:   Blood pressure 117/50, pulse 96, temperature 98.4 °F (36.9 °C), temperature source Oral, resp. rate 18, height 5' 1\" (1.549 m), weight 132 lb 12.8 oz (60.2 kg), SpO2 94%.  Physical Exam  Vitals and nursing note reviewed.   Constitutional:       General: She is awake. She is not in acute distress.     Interventions: Nasal cannula in place.   HENT:      Head: Normocephalic and atraumatic.   Cardiovascular:      Rate and Rhythm: Normal rate and regular rhythm.   Pulmonary:      Effort: No respiratory distress.      Breath sounds: No wheezing, rhonchi or rales.      Comments: Diminished breath sounds bilateral upper lobes  Abdominal:      General: Bowel sounds are normal. There is no distension.      Palpations: Abdomen is soft.      Tenderness: There is no abdominal tenderness.   Musculoskeletal:      Cervical back: Normal range of motion and neck supple.      Right lower leg: No edema.      Left lower leg: No edema.   Skin:     General: Skin is warm and dry.   Neurological:      General: No focal deficit present.      Mental Status: She is alert and oriented to person, place, and time.   Psychiatric:         Mood and Affect: Mood normal.         Behavior: Behavior is cooperative.       Results:   Lab Results   Component Value Date    WBC 14.0 (H) 2024    HGB 9.9 (L) 2024    HCT 31.0 (L) 2024    .0 2024    CREATSERUM 1.32 (H) 2024    BUN 31 (H) 2024     (L) 2024    K 4.7 2024     2024    CO2 22.0 2024      (H) 02/28/2024    CA 10.1 02/28/2024    ALB 3.4 02/24/2024    ALKPHO 63 02/24/2024    BILT 0.2 02/24/2024    TP 6.4 02/24/2024    AST 18 02/24/2024    ALT <7 (L) 02/24/2024    PTT 69.9 (H) 02/28/2024    INR 0.93 12/14/2023    TSH 0.622 10/20/2023    DDIMER 1.60 (H) 02/23/2024    ESRML 91 (H) 09/14/2023    CRP 1.10 (H) 09/14/2023    MG 1.0 (LL) 02/16/2024    TROPHS 9 02/23/2024    B12 748 10/20/2023       Assessment & Plan:   Acute pulmonary embolism  CTA chest with RLL PE, low clot burden and no major central obstruction  No CT evidence of RH strain  LE Doppler US no DVT  Recent hospitalization may be main risk factor  History of breast cancer  Xarelto on hold for anemia -> back on IV heparin given plans for EGD  Hemodynamically stable and oxygenation improving (on 1 L O2)  Plan:  -O2 and wean as tolerated  -Hold anticoagulation given plans for EGD today  -SCDs    Acute on chronic anemia  GI bleeding  History of GI bleeding with AVMs  Hemoglobin improved s/p 1 unit pRBC  Hemoglobin stable  S/p EGD 2/27 with gastric AVMs s/p cautery, incomplete exam due to solid food in stomach; plans for repeat EGD today  Plan:  -Follow hemoglobin  -EGD today per GI  -Will follow up after EGD with anticoagulation recommendations    Abnormal CT chest  History of scarring, bronchiectasis, pneumatoceles, recent pneumonia, and question of prior history of sarcoidosis  Plan:  -Follow-up CT chest in 6-8 weeks    UTI  Urine culture with Klebsiella aerogenes  Started on ceftriaxone  Plan:  -Antibiotics per primary service    ADDENDUM: D/w GI - okay to resume oral anticoagulation. Xarelto now contraindicated with age and CrCl. Will start Eliquis 10 mg BID x7 days then 5 mg BID thereafter.    D/w RN.    Mendoza Brewer PA-C  Pulmonary Medicine  2/28/2024

## 2024-02-28 NOTE — ANESTHESIA POSTPROCEDURE EVALUATION
Patient: Chester Bautista    Procedure Summary       Date: 02/28/24 Room / Location: [facility] ENDOSCOPY 01 / [facility] ENDOSCOPY    Anesthesia Start: 1302 Anesthesia Stop:     Procedure: ESOPHAGOGASTRODUODENOSCOPY (EGD) Diagnosis: (gi bleed)    Surgeons: Zoila Mcqueen MD Anesthesiologist: Rachel Whipple CRNA    Anesthesia Type: MAC ASA Status: 3            Anesthesia Type: MAC    Vitals Value Taken Time   /62 02/28/24 1325   Temp 97 02/28/24 1328   Pulse 78 02/28/24 1327   Resp 24 02/28/24 1327   SpO2 100 % 02/28/24 1327   Vitals shown include unfiled device data.    [facility] AN Post Evaluation:   Patient Evaluated in PACU  Patient Participation: waiting for patient participation  Level of Consciousness: sleepy but conscious  Pain Management: adequate  Airway Patency:patent  Dental exam unchanged from preop  Yes    Nausea/Vomiting: none  Cardiovascular Status: acceptable  Respiratory Status: acceptable  Postoperative Hydration acceptable      Rachel Whipple CRNA  2/28/2024 1:28 PM

## 2024-02-28 NOTE — PROGRESS NOTES
[facility]    Progress Note    Chester Bautista Patient Status:  Inpatient    1940 MRN [id number]   Location [facility]5W Attending Heike Sorto MD   Hosp Day # 5 PCP Heike Sorto MD     Chief Complaint: She denies any complaints today, for repeat  egd today    Subjective:     Constitutional: Negative.    HENT: Negative.     Eyes: Negative.    Respiratory: Negative.     Cardiovascular: Negative.    Gastrointestinal: Negative.    Genitourinary: Negative.    Musculoskeletal: Negative.    Skin: Negative.    Hematological: Negative.    Psychiatric/Behavioral: Negative.         Objective:   Blood pressure 125/70, pulse 99, temperature 98.4 °F (36.9 °C), temperature source Oral, resp. rate 18, height 5' 1\" (1.549 m), weight 132 lb 12.8 oz (60.2 kg), SpO2 98%.  Physical Exam  Vitals and nursing note reviewed.   Constitutional:       Appearance: Normal appearance.   HENT:      Head: Normocephalic and atraumatic.   Cardiovascular:      Rate and Rhythm: Normal rate and regular rhythm.      Pulses: Normal pulses.      Heart sounds: Normal heart sounds.   Pulmonary:      Effort: Pulmonary effort is normal.      Breath sounds: Normal breath sounds.   Abdominal:      General: Bowel sounds are normal.      Palpations: Abdomen is soft.   Musculoskeletal:         General: Normal range of motion.      Cervical back: Normal range of motion and neck supple.   Skin:     General: Skin is warm and dry.   Neurological:      Mental Status: She is alert. Mental status is at baseline.   Psychiatric:         Mood and Affect: Mood normal.         Results:   Lab Results   Component Value Date    WBC 14.0 (H) 2024    HGB 9.9 (L) 2024    HCT 31.0 (L) 2024    .0 2024    CREATSERUM 1.32 (H) 2024    BUN 31 (H) 2024     (L) 2024    K 4.7 2024     2024    CO2 22.0 2024     (H) 2024    CA 10.1 2024    ALB  3.4 02/24/2024    ALKPHO 63 02/24/2024    BILT 0.2 02/24/2024    TP 6.4 02/24/2024    AST 18 02/24/2024    ALT <7 (L) 02/24/2024    PTT 69.9 (H) 02/28/2024    INR 0.93 12/14/2023    TSH 0.622 10/20/2023    DDIMER 1.60 (H) 02/23/2024    ESRML 91 (H) 09/14/2023    CRP 1.10 (H) 09/14/2023    MG 1.0 (LL) 02/16/2024    TROPHS 9 02/23/2024    B12 748 10/20/2023       No results found.        Assessment & Plan:       Acute pulmonary embolism without acute cor pulmonale  -Bilateral lower extremity Doppler negative for DVT Xarelto she is back on heparin which is held again due to repeat EGD today      Anemia status post 1 unit PRBC, hemoglobin stable  history of AVM s/p cautery yesterday, for repeat EGD today       Type 2 diabetes mellitus without retinopathy (HCC)  With sliding scale insulin,   hypoglycemia protocol    Hypertension  -Continue home meds       Chronic obstructive pulmonary disease, unspecified (HCC)  -Continue inhalers-patient is supposed to be on hydroxychloroquine       UA shows Klebsiella, DC Macrobid started on Rocephin     Rheumatoid arthritis (HCC)  -Daily prednisone and hydroxychloroquine     HLD  -Continue statin        GERD  -Continue pantoprazole          VARINDER LATHAM MD  2/28/2024

## 2024-02-28 NOTE — PLAN OF CARE
Patient alert and oriented. Tele. Continuous pulse ox. Heparin drip. Laxative given. NPO at midnight. Frequent rounding by staff. Call light within reach. Safety precautions in place.  Problem: Patient Centered Care  Goal: Patient preferences are identified and integrated in the patient's plan of care  Description: Interventions:  - What would you like us to know as we care for you? From home with family  - Provide timely, complete, and accurate information to patient/family  - Incorporate patient and family knowledge, values, beliefs, and cultural backgrounds into the planning and delivery of care  - Encourage patient/family to participate in care and decision-making at the level they choose  - Honor patient and family perspectives and choices  Outcome: Progressing     Problem: CARDIOVASCULAR - ADULT  Goal: Maintains optimal cardiac output and hemodynamic stability  Description: INTERVENTIONS:  - Monitor vital signs, rhythm, and trends  - Monitor for bleeding, hypotension and signs of decreased cardiac output  - Evaluate effectiveness of vasoactive medications to optimize hemodynamic stability  - Monitor arterial and/or venous puncture sites for bleeding and/or hematoma  - Assess quality of pulses, skin color and temperature  - Assess for signs of decreased coronary artery perfusion - ex. Angina  - Evaluate fluid balance, assess for edema, trend weights  Outcome: Progressing     Problem: RESPIRATORY - ADULT  Goal: Achieves optimal ventilation and oxygenation  Description: INTERVENTIONS:  - Assess for changes in respiratory status  - Assess for changes in mentation and behavior  - Position to facilitate oxygenation and minimize respiratory effort  - Oxygen supplementation based on oxygen saturation or ABGs  - Provide Smoking Cessation handout, if applicable  - Encourage broncho-pulmonary hygiene including cough, deep breathe, Incentive Spirometry  - Assess the need for suctioning and perform as needed  - Assess and  instruct to report SOB or any respiratory difficulty  - Respiratory Therapy support as indicated  - Manage/alleviate anxiety  - Monitor for signs/symptoms of CO2 retention  Outcome: Progressing     Problem: PAIN - ADULT  Goal: Verbalizes/displays adequate comfort level or patient's stated pain goal  Description: INTERVENTIONS:  - Encourage pt to monitor pain and request assistance  - Assess pain using appropriate pain scale  - Administer analgesics based on type and severity of pain and evaluate response  - Implement non-pharmacological measures as appropriate and evaluate response  - Consider cultural and social influences on pain and pain management  - Manage/alleviate anxiety  - Utilize distraction and/or relaxation techniques  - Monitor for opioid side effects  - Notify MD/LIP if interventions unsuccessful or patient reports new pain  - Anticipate increased pain with activity and pre-medicate as appropriate  Outcome: Progressing     Problem: RISK FOR INFECTION - ADULT  Goal: Absence of fever/infection during anticipated neutropenic period  Description: INTERVENTIONS  - Monitor WBC  - Administer growth factors as ordered  - Implement neutropenic guidelines  Outcome: Progressing     Problem: SAFETY ADULT - FALL  Goal: Free from fall injury  Description: INTERVENTIONS:  - Assess pt frequently for physical needs  - Identify cognitive and physical deficits and behaviors that affect risk of falls.  - [place] fall precautions as indicated by assessment.  - Educate pt/family on patient safety including physical limitations  - Instruct pt to call for assistance with activity based on assessment  - Modify environment to reduce risk of injury  - Provide assistive devices as appropriate  - Consider OT/PT consult to assist with strengthening/mobility  - Encourage toileting schedule  Outcome: Progressing     Problem: Patient/Family Goals  Goal: Patient/Family Long Term Goal  Description: Patient's Long Term Goal: to go  home    Interventions:  - - Monitor vitals  - Monitor appropriate labs  - Pain management  - Follow MD order  - Diagnostics per order  - Update/Informing patient and family on plan of care  - Discharge planning  - See additional Care Plan goals for specific interventions  Outcome: Progressing  Goal: Patient/Family Short Term Goal  Description: Patient's Short Term Goal: to feel better    Interventions:   - - Monitor vitals  - Monitor appropriate labs  - Pain management  - Follow MD order  - Diagnostics per order  - Update/Informing patient and family on plan of care  - Discharge planning  - See additional Care Plan goals for specific interventions  Outcome: Progressing     Problem: DISCHARGE PLANNING  Goal: Discharge to home or other facility with appropriate resources  Description: INTERVENTIONS:  - Identify barriers to discharge w/pt and caregiver  - Include patient/family/discharge partner in discharge planning  - Arrange for needed discharge resources and transportation as appropriate  - Identify discharge learning needs (meds, wound care, etc)  - Arrange for interpreters to assist at discharge as needed  - Consider post-discharge preferences of patient/family/discharge partner  - Complete POLST form as appropriate  - Assess patient's ability to be responsible for managing their own health  - Refer to Case Management Department for coordinating discharge planning if the patient needs post-hospital services based on physician/LIP order or complex needs related to functional status, cognitive ability or social support system  Outcome: Progressing

## 2024-02-28 NOTE — PLAN OF CARE
A&Ox4. Pt ambulates standby with walker, EGD done today, heparin gtt restarted after procedure per order, monitoring blood glucose levels per order- ACHS, voiding via purewick, tolerating clear liquid diet, on 1L via NC. Frequent rounding by nursing staff. Safety precautions maintained/call light within reach. Plan for repeat EGD tomorrow.    Problem: Patient Centered Care  Goal: Patient preferences are identified and integrated in the patient's plan of care  Description: Interventions:  - What would you like us to know as we care for you? From home with family  - Provide timely, complete, and accurate information to patient/family  - Incorporate patient and family knowledge, values, beliefs, and cultural backgrounds into the planning and delivery of care  - Encourage patient/family to participate in care and decision-making at the level they choose  - Honor patient and family perspectives and choices  Outcome: Progressing     Problem: CARDIOVASCULAR - ADULT  Goal: Maintains optimal cardiac output and hemodynamic stability  Description: INTERVENTIONS:  - Monitor vital signs, rhythm, and trends  - Monitor for bleeding, hypotension and signs of decreased cardiac output  - Evaluate effectiveness of vasoactive medications to optimize hemodynamic stability  - Monitor arterial and/or venous puncture sites for bleeding and/or hematoma  - Assess quality of pulses, skin color and temperature  - Assess for signs of decreased coronary artery perfusion - ex. Angina  - Evaluate fluid balance, assess for edema, trend weights  Outcome: Progressing     Problem: RESPIRATORY - ADULT  Goal: Achieves optimal ventilation and oxygenation  Description: INTERVENTIONS:  - Assess for changes in respiratory status  - Assess for changes in mentation and behavior  - Position to facilitate oxygenation and minimize respiratory effort  - Oxygen supplementation based on oxygen saturation or ABGs  - Provide Smoking Cessation handout, if  applicable  - Encourage broncho-pulmonary hygiene including cough, deep breathe, Incentive Spirometry  - Assess the need for suctioning and perform as needed  - Assess and instruct to report SOB or any respiratory difficulty  - Respiratory Therapy support as indicated  - Manage/alleviate anxiety  - Monitor for signs/symptoms of CO2 retention  Outcome: Progressing     Problem: PAIN - ADULT  Goal: Verbalizes/displays adequate comfort level or patient's stated pain goal  Description: INTERVENTIONS:  - Encourage pt to monitor pain and request assistance  - Assess pain using appropriate pain scale  - Administer analgesics based on type and severity of pain and evaluate response  - Implement non-pharmacological measures as appropriate and evaluate response  - Consider cultural and social influences on pain and pain management  - Manage/alleviate anxiety  - Utilize distraction and/or relaxation techniques  - Monitor for opioid side effects  - Notify MD/LIP if interventions unsuccessful or patient reports new pain  - Anticipate increased pain with activity and pre-medicate as appropriate  Outcome: Progressing     Problem: RISK FOR INFECTION - ADULT  Goal: Absence of fever/infection during anticipated neutropenic period  Description: INTERVENTIONS  - Monitor WBC  - Administer growth factors as ordered  - Implement neutropenic guidelines  Outcome: Progressing     Problem: SAFETY ADULT - FALL  Goal: Free from fall injury  Description: INTERVENTIONS:  - Assess pt frequently for physical needs  - Identify cognitive and physical deficits and behaviors that affect risk of falls.  - [place] fall precautions as indicated by assessment.  - Educate pt/family on patient safety including physical limitations  - Instruct pt to call for assistance with activity based on assessment  - Modify environment to reduce risk of injury  - Provide assistive devices as appropriate  - Consider OT/PT consult to assist with strengthening/mobility  -  Encourage toileting schedule  Outcome: Progressing

## 2024-02-28 NOTE — INTERVAL H&P NOTE
Pre-op Diagnosis: Anemia, Hx of AVMs    The above referenced H&P was reviewed by Zoila Bolton Ma, MD on 2/28/2024, the patient was examined and no significant changes have occurred in the patient's condition since the H&P was performed.  I discussed with the patient and/or legal representative the potential benefits, risks and side effects of this procedure; the likelihood of the patient achieving goals; and potential problems that might occur during recuperation.  I discussed reasonable alternatives to the procedure, including risks, benefits and side effects related to the alternatives and risks related to not receiving this procedure.  We will proceed with procedure as planned.

## 2024-02-28 NOTE — PROGRESS NOTES
PT PM note: Pt away from room for Procedure(s):  ESOPHAGOGASTRODUODENOSCOPY (EGD)  Indication: gi bleed. PT will continue to follow and progress as medical progress and pt availability allows.

## 2024-02-28 NOTE — OPERATIVE REPORT
ESOPHAGOGASTRODUODENOSCOPY (EGD) REPORT    Chester Bautista     1940 Age 84 year old   PCP Heike Sorto MD Endoscopist Zoila Mcqueen MD     Date of procedure: 24    Procedure: EGD w/control of bleeding    Pre-operative diagnosis: anemia    Post-operative diagnosis: see impression    Medications: MAC    Complications: none    Procedure:  Informed consent was obtained from the patient after the risks of the procedure were discussed, including but not limited to bleeding, perforation, aspiration, infection, or possibility of a missed lesion. After discussions of the risks/benefits and alternatives to this procedure, as well as the planned sedation, the patient was placed in the left lateral decubitus position and begun on continuous blood pressure pulse oximetry and EKG monitoring and this was maintained throughout the procedure. Once an adequate level of sedation was obtained a bite block was placed. Then the lubricated tip of the [id number] diagnostic video upper endoscope was inserted and advanced using direct visualization into the posterior pharynx and ultimately into the esophagus, stomach, and duodenum.    Complications: None    Findings:      1. Esophagus: Small 2 cm hiatal hernia. Normal esophagus.      2. Stomach: We then entered the stomach. Bilious clear secretions. Three non-bleeding small AVMs in the greater curvature of stomach, treated with argon plasma coagulation. Recently treated AVM sites seen, clean based ulcers at this time. Retroflexion performed to view the fundus and a patulous cardia.     3. Duodenum: Over 6-7 small to medium sized non-bleeding AVMs in the duodenal bulb. These were left undisturbed.    We then withdrew the instrument from the patient who tolerated the procedure well.     Impression:   1. Multiple gastric and duodenal AVMs. She recently underwent cautery of gastric and proximal duodenal avms in two months ago and now multiple new ones have grown.  Suspect she will have chronic AVM disease given its natural course. Utility of prophylactic endoscopic treatment is not clear as she will continue to grow new ones. Suggest medical management,.    Recommend:  1. May resume diet  2. Start octreotide injections for medical management of chronic AVM disease  3. May resume heparin gtt    >>>If tissue was sampled/removed and you have not received your pathology results either by phone or letter within 2 weeks, please call our office at [phone].    Specimens:  none    Blood loss: <1 ml      Zoila Mcqueen MD  [facility] Gastroenterology

## 2024-02-28 NOTE — ANESTHESIA PREPROCEDURE EVALUATION
Anesthesia PreOp Note    HPI:     Chester Bautista is a 84 year old female who presents for preoperative consultation requested by: Zoila Mcqueen MD    Date of Surgery: 2/23/2024 - 2/28/2024    Procedure(s):  ESOPHAGOGASTRODUODENOSCOPY (EGD)  Indication: gi bleed    Relevant Problems   No relevant active problems       NPO:  Last Liquid Consumption Date: 02/28/24  Last Liquid Consumption Time: 2358  Last Solid Consumption Date: 02/26/24  Last Solid Consumption Time:  (unsure of what time)  Last Liquid Consumption Date: 02/28/24          History Review:  Patient Active Problem List    Diagnosis Date Noted    ABLA (acute blood loss anemia) 02/26/2024    VTE (venous thromboembolism) 02/25/2024    PE (pulmonary thromboembolism) (HCC) 02/23/2024    Hypokalemia 02/02/2024    High risk medication use 07/13/2023    Meibomian gland dysfunction (MGD) of both eyes 07/13/2023    Encounter for care related to vascular access port 07/11/2023    Adnexal mass 05/31/2023    Chronic obstructive pulmonary disease, unspecified (HCC) 05/04/2023    Port-A-Cath in place 10/19/2022    Iron deficiency anemia due to chronic blood loss 08/08/2022    Malabsorption of iron (HCC) 08/08/2022    Pseudophakia of both eyes 07/27/2022    Vitreous floaters of both eyes 07/27/2022    Glaucoma suspect of both eyes 07/27/2022    AVM (arteriovenous malformation) of duodenum, acquired 07/25/2022    Neuropathy 06/28/2022    Malabsorption (HCC) 05/23/2022    Peripheral vascular disease due to secondary diabetes mellitus (HCC) 03/09/2022    Type 2 diabetes mellitus without retinopathy (HCC) 11/05/2021    Sarcoidosis 11/05/2021    Dyslipidemia 11/05/2021    Hypertension 11/05/2021    Rheumatoid arthritis (HCC) 11/05/2021    Osteoarthrosis 11/05/2021    Adult general medical examination 11/04/2021    Vaccine counseling 11/04/2021    Colon cancer screening 11/04/2021    Hypercalcemia 02/22/2017    Infiltrating ductal carcinoma of right breast, stage 1  (HCC) 06/22/2016    Anemia, iron deficiency 10/17/2014       Past Medical History:   Diagnosis Date    Anxiety state     Cancer (HCC)     breast cancer 2018    Chronic obstructive pulmonary disease, unspecified (HCC) 05/04/2023    Chronic obstructive pulmonary disease, unspecified (HCC) 05/04/2023    COPD (chronic obstructive pulmonary disease) (HCC)     Diabetes (HCC)     Essential hypertension     Exposure to medical diagnostic radiation     High blood pressure     High cholesterol     History of blood transfusion 07/2022    low hemoglobin    Osteoarthritis     PONV (postoperative nausea and vomiting)     Pulmonary embolism (HCC)     Pulmonary emphysema (HCC)     Rheumatoid arthritis (HCC)        Past Surgical History:   Procedure Laterality Date    CATARACT EXTRACTION W/  INTRAOCULAR LENS IMPLANT Bilateral 2017    Dr in [facility]     COLONOSCOPY      COLONOSCOPY N/A 07/21/2022    Procedure: COLONOSCOPY;  Surgeon: Mikey Garcia MD;  Location: [facility] ENDOSCOPY    COLONOSCOPY N/A 06/15/2023    Procedure: COLONOSCOPY;  Surgeon: Mikey Garcia MD;  Location: [facility] ENDOSCOPY    CORRECT BUNION,SIMPLE      right foot  1993    EGD  12/21/2023    Dr. Garcia; Duodenal AVM's x4 cauterized    HYSTERECTOMY      1972, no abnormal Paps, due to heavy bleeding with fibroids.  Not sure if it had bilateral salpingo-oophorectomy.    IR ANEURYSM REPAIRM  2010    Computer assisted volumetric craniofomy with clipping of anterior cerebral artery.    LUMPECTOMY RIGHT      RADIATION RIGHT      ROTATOR CUFF REPAIR      1993    TOTAL KNEE REPLACEMENT Right 2010    TOTAL KNEE REPLACEMENT Left 07/02/2015    TRANSORAL INCISIONLESS FUNDOPLICATION - INTERNAL  01/25/2012 Fredy fundoplication at [facility] Dr. Fournier.    Fredy fundoplication at [facility] Dr. Fournier.    TRIGGER FINGER RELEASE      left hand  2005    YAG CAPSULOTOMY - OU - BOTH EYES Bilateral 09/2021    done in [place]       Medications Prior to  Admission   Medication Sig Dispense Refill Last Dose    LORATADINE 10 MG Oral Tab TAKE 1 TABLET BY MOUTH EVERY DAY 90 tablet 1 2/23/2024 at 0800    glipiZIDE 5 MG Oral Tab Take 1.5 tablets (7.5 mg total) by mouth daily with breakfast.   2/23/2024 at 0800    Accu-Chek FastClix Lancets Does not apply Misc Check sugar once daily as directed (E11.42) 100 each 2 Past Week    hydroxychloroquine 200 MG Oral Tab Take 1 tablet (200 mg total) by mouth daily. 90 tablet 1 2/22/2024 at 2200    NIFEdipine ER 60 MG Oral Tablet 24 Hr Take 1 tablet (60 mg total) by mouth daily. 30 tablet 1 2/23/2024 at 0800    Glucose Blood (ACCU-CHEK GUIDE) In Vitro Strip Check blood sugar twice a day (fasting and before dinner). DX: E11.42, NIDDM 200 strip 1 2/23/2024    rosuvastatin (CRESTOR) 20 MG Oral Tab Take 1 tablet (20 mg total) by mouth nightly. 90 tablet 0 2/22/2024 at 2200    omeprazole 20 MG Oral Capsule Delayed Release Take 1 capsule (20 mg total) by mouth every morning. 90 capsule 1 2/23/2024 at 0800    cyclobenzaprine 10 MG Oral Tab Take 1 tablet (10 mg total) by mouth nightly as needed for Muscle spasms. 90 tablet 1 Past Week    glycopyrrolate 1 MG Oral Tab Take 1 tablet (1 mg total) by mouth 3 (three) times daily. 90 tablet 1 2/23/2024 at 0800    albuterol 108 (90 Base) MCG/ACT Inhalation Aero Soln inhale 2 puff by inhalation route  every 4 - 6 hours as needed 1 each 0 2/23/2024 at 0900    predniSONE 5 MG Oral Tab Take 1 tablet (5 mg total) by mouth daily. 90 tablet 1 2/23/2024 at 0800    montelukast 10 MG Oral Tab Take 1 tablet (10 mg total) by mouth nightly. 90 tablet 3 2/22/2024 at 2200    carvedilol 25 MG Oral Tab Take 1 tablet (25 mg total) by mouth 2 (two) times daily with meals. 180 tablet 3 2/23/2024 at 0800    valsartan 80 MG Oral Tab Take 1 tablet (80 mg total) by mouth daily. 90 tablet 3 2/23/2024 at 0800    spironolactone 25 MG Oral Tab Take 2 tablets (50 mg total) by mouth daily. 180 tablet 1 2/23/2024 at 0800     fluticasone-salmeterol (WIXELA INHUB) 250-50 MCG/ACT Inhalation Aerosol Powder, Breath Activated Inhale 1 puff into the lungs every 12 (twelve) hours. 1 each 5 Past Week    albuterol 108 (90 Base) MCG/ACT Inhalation Aero Soln inhale 2 puff by inhalation route  every 4 - 6 hours as needed 1 each 5 2/23/2024 at 0900    Blood Glucose Monitoring Suppl (ONETOUCH VERIO) w/Device Does not apply Kit 1 each daily. Test 1x daily 1 kit 0 Past Week    OneTouch Delica Lancets 33G Does not apply Misc 4 x daily 100 each 1 Past Week    Blood Glucose Monitoring Suppl (BLOOD GLUCOSE SYSTEM GILLES) Does not apply Kit DX E 11.9.  Checks every morning. 1 kit 0 Past Week    Lancets Does not apply Misc DX E 11.9.  Checks every morning. 50 each 11 Past Week    Blood Glucose Monitoring Suppl w/Device Does not apply Kit DX E 11.9.  Checks every morning. 1 kit 0 Past Week    Blood Gluc Meter Disp-Strips Does not apply Device DX E 11.9.  Checks every morning. 100 each 11 Past Week    acetaminophen 500 MG Oral Tab Take 1 tablet (500 mg total) by mouth every 6 (six) hours as needed for Pain.   2/23/2024 at 0900    aspirin 81 MG Oral Tab EC Take 325 mg by mouth daily.   2/23/2024 at 0900    Glucose Blood (ONETOUCH VERIO) In Vitro Strip Test 1x daily 100 strip 1      Current Facility-Administered Medications Ordered in Epic   Medication Dose Route Frequency Provider Last Rate Last Admin    cefTRIAXone (Rocephin) 1 g in D5W 100 mL IVPB-ADD  1 g Intravenous Q24H Alana Flores  mL/hr at 02/28/24 0913 1 g at 02/28/24 0913    [Held by provider] rivaroxaban (Xarelto) tab 15 mg  15 mg Oral BID with meals Zoila Mcqueen MD   15 mg at 02/25/24 1728    traMADol (Ultram) tab 50 mg  50 mg Oral Q12H PRN Alejandra Palomino MD   50 mg at 02/25/24 2047    glucose (Dex4) 15 GM/59ML oral liquid 15 g  15 g Oral Q15 Min PRN Alejandra Palomino MD        Or    glucose (Glutose) 40% oral gel 15 g  15 g Oral Q15 Min PRN Alejandra Palomino MD        Or     glucose-vitamin C (Dex-4) chewable tab 4 tablet  4 tablet Oral Q15 Min PRN Alejandra Palomino MD        Or    dextrose 50% injection 50 mL  50 mL Intravenous Q15 Min PRN Alejandra Palomino MD        Or    glucose (Dex4) 15 GM/59ML oral liquid 30 g  30 g Oral Q15 Min PRN Alejandra Palomino MD        Or    glucose (Glutose) 40% oral gel 30 g  30 g Oral Q15 Min PRN Alejandra Palomino MD        Or    glucose-vitamin C (Dex-4) chewable tab 8 tablet  8 tablet Oral Q15 Min PRN Alejandra Palomino MD        insulin aspart (NovoLOG) 100 Units/mL FlexPen 1-7 Units  1-7 Units Subcutaneous TID CC Alejandra Palomino MD   2 Units at 02/28/24 1157    cyclobenzaprine (Flexeril) tab 10 mg  10 mg Oral Nightly Alejandra Palomino MD   10 mg at 02/27/24 2141    acetaminophen (Tylenol Extra Strength) tab 500 mg  500 mg Oral Q6H PRN Alejandra Palomino MD   500 mg at 02/23/24 2324    albuterol (Ventolin HFA) 108 (90 Base) MCG/ACT inhaler 2 puff  2 puff Inhalation Q4H PRN Alejandra Palomino MD   2 puff at 02/25/24 1754    carvedilol (Coreg) tab 25 mg  25 mg Oral BID with meals Alejandra Palomino MD   25 mg at 02/28/24 0913    fluticasone furoate-vilanterol (Breo Ellipta) 100-25 MCG/ACT inhaler 1 puff  1 puff Inhalation Daily Chapin Parker MD   1 puff at 02/28/24 0934    glycopyrrolate (Robinul) tab 1 mg  1 mg Oral TID Alejandra Palomino MD   1 mg at 02/28/24 0914    hydroxychloroquine (Plaquenil) tab 200 mg  200 mg Oral Daily Alejandra Palomino MD   200 mg at 02/27/24 2141    cetirizine (ZyrTEC) tab 5 mg  5 mg Oral Daily Alejandra Palomino MD   5 mg at 02/28/24 0914    montelukast (Singulair) tab 10 mg  10 mg Oral Nightly Alejandra Palomino MD   10 mg at 02/27/24 2141    NIFEdipine ER (Procardia-XL) 24 hr tab 60 mg  60 mg Oral Daily Alejandra Palomino MD   60 mg at 02/28/24 0913    pantoprazole (Protonix) DR tab 20 mg  20 mg Oral QAM AC Alejandra Palomino MD   20 mg at 02/26/24 0513    potassium chloride (K-Dur) tab 20 mEq  20 mEq Oral BID Georgette  MD Alejandra   20 mEq at 02/28/24 0913    predniSONE (Deltasone) tab 5 mg  5 mg Oral Daily Alejandra Palomino MD   5 mg at 02/28/24 0913    rosuvastatin (Crestor) tab 10 mg  10 mg Oral Nightly Alejandra Palomino MD   10 mg at 02/27/24 2141    spironolactone (Aldactone) tab 50 mg  50 mg Oral Daily Alejandra Palomino MD   50 mg at 02/28/24 0913    torsemide (Demadex) tab 20 mg  20 mg Oral Daily Alejandra Palomino MD   20 mg at 02/28/24 0913    losartan (Cozaar) tab 50 mg  50 mg Oral Daily Alejandra Palomino MD   50 mg at 02/28/24 0914    melatonin tab 3 mg  3 mg Oral Nightly PRN Alejandra Palomino MD        polyethylene glycol (PEG 3350) (Miralax) 17 g oral packet 17 g  17 g Oral Daily PRN Alejandra Palomino MD   17 g at 02/27/24 1528    sennosides (Senokot) tab 17.2 mg  17.2 mg Oral Nightly PRN Alejandra Palomino MD   17.2 mg at 02/27/24 2233    bisacodyl (Dulcolax) 10 MG rectal suppository 10 mg  10 mg Rectal Daily PRN Alejandra Palomino MD        ondansetron (Zofran) 4 MG/2ML injection 4 mg  4 mg Intravenous Q6H PRN Alejandra Palomino MD        metoclopramide (Reglan) 5 mg/mL injection 5 mg  5 mg Intravenous Q8H PRN Alejandra Palomino MD        benzonatate (Tessalon) cap 200 mg  200 mg Oral TID PRN Alejandra Palomino MD        guaiFENesin (Robitussin) 100 MG/5 ML oral liquid 200 mg  200 mg Oral Q4H PRN Alejandra Palomino MD   200 mg at 02/27/24 2022    glycerin-hypromellose- (Artifical Tears) 0.2-0.2-1 % ophthalmic solution 1 drop  1 drop Both Eyes QID PRN Alejandra Palomino MD        sodium chloride (Saline Mist) 0.65 % nasal solution 1 spray  1 spray Each Nare Q3H PRN Alejandra Palomino MD         No current Epic-ordered outpatient medications on file.       Allergies   Allergen Reactions    Celebrex [Celecoxib] PALPITATIONS    Penicillins ITCHING and SWELLING    Metformin And Related UNKNOWN    Olmesartan UNKNOWN       Family History   Problem Relation Age of Onset    Heart Surgery Mother         Leaky valve at 41 y/o.      Prostate Cancer Brother     Cancer Brother     Diabetes Maternal Grandmother     Diabetes Paternal Aunt     Breast Cancer Self 78    Breast Cancer Niece         before 50    Macular degeneration Neg     Glaucoma Neg      Social History     Socioeconomic History    Marital status:    Tobacco Use    Smoking status: Former     Packs/day: .25     Types: Cigarettes     Quit date:      Years since quittin.1    Smokeless tobacco: Never    Tobacco comments:     [id number]   Vaping Use    Vaping Use: Never used   Substance and Sexual Activity    Alcohol use: Never    Drug use: Never       Available pre-op labs reviewed.  Lab Results   Component Value Date    WBC 14.0 (H) 2024    RBC 3.47 (L) 2024    HGB 9.9 (L) 2024    HCT 31.0 (L) 2024    MCV 89.3 2024    MCH 28.5 2024    MCHC 31.9 2024    RDW 17.3 (H) 2024    .0 2024     Lab Results   Component Value Date     (L) 2024    K 4.7 2024     2024    CO2 22.0 2024    BUN 31 (H) 2024    CREATSERUM 1.32 (H) 2024     (H) 2024    PGLU 209 (H) 2024    CA 10.1 2024          Vital Signs:  Body mass index is 25.09 kg/m².   height is 1.549 m (5' 1\") and weight is 60.2 kg (132 lb 12.8 oz). Her oral temperature is 98.4 °F (36.9 °C). Her blood pressure is 111/65 and her pulse is 87. Her respiration is 25 and oxygen saturation is 100%.   Vitals:    24 0200 24 0545 24 0900 24 1223   BP: 103/58 125/70 117/50 111/65   Pulse: 88 99 96 87   Resp: 19 18 18 25   Temp: 98.4 °F (36.9 °C) 98.4 °F (36.9 °C) 98.4 °F (36.9 °C)    TempSrc: Oral Oral Oral    SpO2: 99% 98% 94% 100%   Weight:       Height:            Anesthesia Evaluation     Patient summary reviewed and Nursing notes reviewed    History of anesthetic complications   Airway   Mallampati: II  TM distance: >3 FB  Neck ROM: full  Dental    (+) lower dentures and upper  dentures    Pulmonary - normal exam   (+) COPD  Cardiovascular - normal exam  (+) hypertension    Neuro/Psych    (+)  anxiety/panic attacks,        GI/Hepatic/Renal      Endo/Other    (+) diabetes mellitus type 2 well controlled, arthritis  Abdominal  - normal exam                 Anesthesia Plan:   ASA:  3  Plan:   MAC      I have informed Chester Bautista and/or legal guardian or family member of the nature of the anesthetic plan, benefits, risks including possible dental damage if relevant, major complications, and any alternative forms of anesthetic management.   All of the patient's questions were answered to the best of my ability. The patient desires the anesthetic management as planned.  Rachel Whipple CRNA  2/28/2024 12:29 PM  Present on Admission:   Type 2 diabetes mellitus without retinopathy (HCC)   Hypertension   Chronic obstructive pulmonary disease, unspecified (HCC)

## 2024-02-29 ENCOUNTER — TELEPHONE (OUTPATIENT)
Facility: CLINIC | Age: 84
End: 2024-02-29

## 2024-02-29 DIAGNOSIS — Q27.30 AVM (ARTERIOVENOUS MALFORMATION) (HCC): Primary | ICD-10-CM

## 2024-02-29 PROBLEM — N17.9 AKI (ACUTE KIDNEY INJURY) (HCC): Status: ACTIVE | Noted: 2024-02-29

## 2024-02-29 PROBLEM — N17.9 AKI (ACUTE KIDNEY INJURY): Status: ACTIVE | Noted: 2024-02-29

## 2024-02-29 PROBLEM — E87.5 HYPERKALEMIA: Status: ACTIVE | Noted: 2024-02-29

## 2024-02-29 LAB
ANION GAP SERPL CALC-SCNC: 11 MMOL/L (ref 0–18)
ANION GAP SERPL CALC-SCNC: 9 MMOL/L (ref 0–18)
BASOPHILS # BLD AUTO: 0.05 X10(3) UL (ref 0–0.2)
BASOPHILS NFR BLD AUTO: 0.3 %
BUN BLD-MCNC: 36 MG/DL (ref 9–23)
BUN BLD-MCNC: 36 MG/DL (ref 9–23)
BUN/CREAT SERPL: 16.9 (ref 10–20)
BUN/CREAT SERPL: 17.3 (ref 10–20)
CALCIUM BLD-MCNC: 10.1 MG/DL (ref 8.7–10.4)
CALCIUM BLD-MCNC: 9.6 MG/DL (ref 8.7–10.4)
CHLORIDE SERPL-SCNC: 104 MMOL/L (ref 98–112)
CHLORIDE SERPL-SCNC: 104 MMOL/L (ref 98–112)
CO2 SERPL-SCNC: 20 MMOL/L (ref 21–32)
CO2 SERPL-SCNC: 21 MMOL/L (ref 21–32)
CREAT BLD-MCNC: 2.08 MG/DL
CREAT BLD-MCNC: 2.13 MG/DL
DEPRECATED RDW RBC AUTO: 53.8 FL (ref 35.1–46.3)
EGFRCR SERPLBLD CKD-EPI 2021: 22 ML/MIN/1.73M2 (ref 60–?)
EGFRCR SERPLBLD CKD-EPI 2021: 23 ML/MIN/1.73M2 (ref 60–?)
EOSINOPHIL # BLD AUTO: 0.25 X10(3) UL (ref 0–0.7)
EOSINOPHIL NFR BLD AUTO: 1.7 %
ERYTHROCYTE [DISTWIDTH] IN BLOOD BY AUTOMATED COUNT: 17.4 % (ref 11–15)
GLUCOSE BLD-MCNC: 137 MG/DL (ref 70–99)
GLUCOSE BLD-MCNC: 184 MG/DL (ref 70–99)
GLUCOSE BLDC GLUCOMTR-MCNC: 177 MG/DL (ref 70–99)
GLUCOSE BLDC GLUCOMTR-MCNC: 204 MG/DL (ref 70–99)
GLUCOSE BLDC GLUCOMTR-MCNC: 232 MG/DL (ref 70–99)
GLUCOSE BLDC GLUCOMTR-MCNC: 232 MG/DL (ref 70–99)
HCT VFR BLD AUTO: 32.6 %
HGB BLD-MCNC: 10.1 G/DL
IMM GRANULOCYTES # BLD AUTO: 0.15 X10(3) UL (ref 0–1)
IMM GRANULOCYTES NFR BLD: 1 %
LYMPHOCYTES # BLD AUTO: 2.27 X10(3) UL (ref 1–4)
LYMPHOCYTES NFR BLD AUTO: 15.6 %
MCH RBC QN AUTO: 28.2 PG (ref 26–34)
MCHC RBC AUTO-ENTMCNC: 31 G/DL (ref 31–37)
MCV RBC AUTO: 91.1 FL
MONOCYTES # BLD AUTO: 1.26 X10(3) UL (ref 0.1–1)
MONOCYTES NFR BLD AUTO: 8.7 %
NEUTROPHILS # BLD AUTO: 10.53 X10 (3) UL (ref 1.5–7.7)
NEUTROPHILS # BLD AUTO: 10.53 X10(3) UL (ref 1.5–7.7)
NEUTROPHILS NFR BLD AUTO: 72.7 %
OSMOLALITY SERPL CALC.SUM OF ELEC: 288 MOSM/KG (ref 275–295)
OSMOLALITY SERPL CALC.SUM OF ELEC: 293 MOSM/KG (ref 275–295)
PLATELET # BLD AUTO: 362 10(3)UL (ref 150–450)
POTASSIUM SERPL-SCNC: 5.3 MMOL/L (ref 3.5–5.1)
POTASSIUM SERPL-SCNC: 5.4 MMOL/L (ref 3.5–5.1)
RBC # BLD AUTO: 3.58 X10(6)UL
SODIUM SERPL-SCNC: 134 MMOL/L (ref 136–145)
SODIUM SERPL-SCNC: 135 MMOL/L (ref 136–145)
WBC # BLD AUTO: 14.5 X10(3) UL (ref 4–11)

## 2024-02-29 PROCEDURE — 99233 SBSQ HOSP IP/OBS HIGH 50: CPT | Performed by: INTERNAL MEDICINE

## 2024-02-29 PROCEDURE — 99232 SBSQ HOSP IP/OBS MODERATE 35: CPT | Performed by: INTERNAL MEDICINE

## 2024-02-29 RX ORDER — INSULIN DEGLUDEC 100 U/ML
8 INJECTION, SOLUTION SUBCUTANEOUS NIGHTLY
Status: DISCONTINUED | OUTPATIENT
Start: 2024-02-29 | End: 2024-03-05

## 2024-02-29 RX ORDER — SODIUM POLYSTYRENE SULFONATE 4.1 MEQ/G
15 POWDER, FOR SUSPENSION ORAL; RECTAL ONCE
Status: COMPLETED | OUTPATIENT
Start: 2024-02-29 | End: 2024-02-29

## 2024-02-29 RX ORDER — IPRATROPIUM BROMIDE AND ALBUTEROL SULFATE 2.5; .5 MG/3ML; MG/3ML
3 SOLUTION RESPIRATORY (INHALATION) EVERY 6 HOURS PRN
Status: DISCONTINUED | OUTPATIENT
Start: 2024-02-29 | End: 2024-03-13

## 2024-02-29 RX ORDER — SODIUM CHLORIDE 9 MG/ML
INJECTION, SOLUTION INTRAVENOUS ONCE
Status: COMPLETED | OUTPATIENT
Start: 2024-02-29 | End: 2024-02-29

## 2024-02-29 RX ORDER — TAMSULOSIN HYDROCHLORIDE 0.4 MG/1
0.4 CAPSULE ORAL DAILY
Status: DISCONTINUED | OUTPATIENT
Start: 2024-02-29 | End: 2024-03-13

## 2024-02-29 RX ORDER — OCTREOTIDE ACETATE 100 UG/ML
100 INJECTION, SOLUTION INTRAVENOUS; SUBCUTANEOUS EVERY 8 HOURS
Status: DISCONTINUED | OUTPATIENT
Start: 2024-02-29 | End: 2024-03-13

## 2024-02-29 NOTE — TELEPHONE ENCOUNTER
Called pt's cell, spoke w/ daughter, Brea, (noted pt is still in the hospital)    Daughter stated she will call back, GI contact number provided.     CSS:  Please transfer to RN if patient/ daughter calls back.  Thank you.

## 2024-02-29 NOTE — TELEPHONE ENCOUNTER
PPD team,    Please assist with initiating PA for Sandostatin    Dx: GI  AVM disease    Thank you.

## 2024-02-29 NOTE — PROGRESS NOTES
[facility]     Gastroenterology Progress Note    Chester Bautista Patient Status:  Inpatient    1940 MRN [id number]   Location [facility]5W Attending Heike Sorto MD   Hosp Day # 6 PCP Heike Sorto MD       Subjective:   Doing well this morning, no abd pain, n/v. No specific complaints. Eating breakfast     Objective:   Blood pressure 109/61, pulse 99, temperature 98.5 °F (36.9 °C), temperature source Oral, resp. rate 19, height 5' 1\" (1.549 m), weight 129 lb 6.4 oz (58.7 kg), SpO2 91%. Body mass index is 24.45 kg/m².    General: awake, alert and oriented, no acute distress  HEENT: moist mucus membranes  PULM: no conversational dyspnea  CARDIOVASCULAR: regular rate and rhythm, the extremities are warm and well perfused  GI: soft, non-tender, non-distended, + BS, no rebound/guarding   EXTREMITIES: no edema, moving all extremities  SKIN: no visible rash  NEURO: appropriate and interactive    Assessment and Plan:   Chester Bautista is a a(n) 84 year old female w/ a history of breast cancer s/p lumpectomy and radiation, rheumatoid arthritis, COPD, HTN, HLD, DM, who presented with shortness of breath found to have new PE started on XARELTO this admission. GI consulted for acute on chronic anemia.    EGD with multiple gastric avm's s/p APC and multiple duodenal avms left undisturbed. Resumed on XARELTO last night, hgb 10.1 this morning. Started on octreotide 50mcg subcutaneous TID yesterday, will increase to 100 mcg TID today and to be continued at discharge. Started insurance approval process for SANDOSTATIN LAR 20 mg IM monthly.     GI will sign off, will be available as needed, please call with questions.     Zoila Mcqueen MD  [facility] Gastroenterology      Results:     Lab Results   Component Value Date    WBC 14.5 (H) 2024    HGB 10.1 (L) 2024    HCT 32.6 (L) 2024    .0 2024    CREATSERUM 2.13 (H) 2024    BUN 36  (H) 02/29/2024     (L) 02/29/2024    K 5.4 (H) 02/29/2024     02/29/2024    CO2 20.0 (L) 02/29/2024     (H) 02/29/2024    CA 10.1 02/29/2024    ALB 3.4 02/24/2024    ALKPHO 63 02/24/2024    BILT 0.2 02/24/2024    TP 6.4 02/24/2024    AST 18 02/24/2024    ALT <7 (L) 02/24/2024    PTT 69.9 (H) 02/28/2024    INR 0.93 12/14/2023    TSH 0.622 10/20/2023    DDIMER 1.60 (H) 02/23/2024    ESRML 91 (H) 09/14/2023    CRP 1.10 (H) 09/14/2023    MG 1.0 (LL) 02/16/2024    B12 748 10/20/2023       No results found.

## 2024-02-29 NOTE — PLAN OF CARE
Patient alert, oriented, vitals stable. Denies pain/discomfort. MD made aware of creatinine, new orders, recheck bmp in the afternoon. Up and ambulatory. Call light within reach, able to make needs known.       Problem: Patient Centered Care  Goal: Patient preferences are identified and integrated in the patient's plan of care  Description: Interventions:  - What would you like us to know as we care for you? From home with family  - Provide timely, complete, and accurate information to patient/family  - Incorporate patient and family knowledge, values, beliefs, and cultural backgrounds into the planning and delivery of care  - Encourage patient/family to participate in care and decision-making at the level they choose  - Honor patient and family perspectives and choices  Outcome: Progressing     Problem: CARDIOVASCULAR - ADULT  Goal: Maintains optimal cardiac output and hemodynamic stability  Description: INTERVENTIONS:  - Monitor vital signs, rhythm, and trends  - Monitor for bleeding, hypotension and signs of decreased cardiac output  - Evaluate effectiveness of vasoactive medications to optimize hemodynamic stability  - Monitor arterial and/or venous puncture sites for bleeding and/or hematoma  - Assess quality of pulses, skin color and temperature  - Assess for signs of decreased coronary artery perfusion - ex. Angina  - Evaluate fluid balance, assess for edema, trend weights  Outcome: Progressing     Problem: RESPIRATORY - ADULT  Goal: Achieves optimal ventilation and oxygenation  Description: INTERVENTIONS:  - Assess for changes in respiratory status  - Assess for changes in mentation and behavior  - Position to facilitate oxygenation and minimize respiratory effort  - Oxygen supplementation based on oxygen saturation or ABGs  - Provide Smoking Cessation handout, if applicable  - Encourage broncho-pulmonary hygiene including cough, deep breathe, Incentive Spirometry  - Assess the need for suctioning and  perform as needed  - Assess and instruct to report SOB or any respiratory difficulty  - Respiratory Therapy support as indicated  - Manage/alleviate anxiety  - Monitor for signs/symptoms of CO2 retention  Outcome: Progressing     Problem: PAIN - ADULT  Goal: Verbalizes/displays adequate comfort level or patient's stated pain goal  Description: INTERVENTIONS:  - Encourage pt to monitor pain and request assistance  - Assess pain using appropriate pain scale  - Administer analgesics based on type and severity of pain and evaluate response  - Implement non-pharmacological measures as appropriate and evaluate response  - Consider cultural and social influences on pain and pain management  - Manage/alleviate anxiety  - Utilize distraction and/or relaxation techniques  - Monitor for opioid side effects  - Notify MD/LIP if interventions unsuccessful or patient reports new pain  - Anticipate increased pain with activity and pre-medicate as appropriate  Outcome: Progressing     Problem: RISK FOR INFECTION - ADULT  Goal: Absence of fever/infection during anticipated neutropenic period  Description: INTERVENTIONS  - Monitor WBC  - Administer growth factors as ordered  - Implement neutropenic guidelines  Outcome: Progressing     Problem: SAFETY ADULT - FALL  Goal: Free from fall injury  Description: INTERVENTIONS:  - Assess pt frequently for physical needs  - Identify cognitive and physical deficits and behaviors that affect risk of falls.  - [place] fall precautions as indicated by assessment.  - Educate pt/family on patient safety including physical limitations  - Instruct pt to call for assistance with activity based on assessment  - Modify environment to reduce risk of injury  - Provide assistive devices as appropriate  - Consider OT/PT consult to assist with strengthening/mobility  - Encourage toileting schedule  Outcome: Progressing     Problem: Patient/Family Goals  Goal: Patient/Family Long Term Goal  Description:  Patient's Long Term Goal: to go home    Interventions:  - - Monitor vitals  - Monitor appropriate labs  - Pain management  - Follow MD order  - Diagnostics per order  - Update/Informing patient and family on plan of care  - Discharge planning  - See additional Care Plan goals for specific interventions  Outcome: Progressing  Goal: Patient/Family Short Term Goal  Description: Patient's Short Term Goal: to feel better    Interventions:   - - Monitor vitals  - Monitor appropriate labs  - Pain management  - Follow MD order  - Diagnostics per order  - Update/Informing patient and family on plan of care  - Discharge planning  - See additional Care Plan goals for specific interventions  Outcome: Progressing     Problem: DISCHARGE PLANNING  Goal: Discharge to home or other facility with appropriate resources  Description: INTERVENTIONS:  - Identify barriers to discharge w/pt and caregiver  - Include patient/family/discharge partner in discharge planning  - Arrange for needed discharge resources and transportation as appropriate  - Identify discharge learning needs (meds, wound care, etc)  - Arrange for interpreters to assist at discharge as needed  - Consider post-discharge preferences of patient/family/discharge partner  - Complete POLST form as appropriate  - Assess patient's ability to be responsible for managing their own health  - Refer to Case Management Department for coordinating discharge planning if the patient needs post-hospital services based on physician/LIP order or complex needs related to functional status, cognitive ability or social support system  Outcome: Progressing

## 2024-02-29 NOTE — PROGRESS NOTES
[facility]    Progress Note      Assessment and Plan:   1.  Acute venous thromboembolism-doing well clinically and no evidence of recurrent bleeding back on Xarelto.  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     Recommendations:  1.  Transition to Xarelto.  2.  Morning CBC     2.  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  There also was question of prior history of sarcoidosis.  There is leukocytosis at present.  She carries prior diagnosis pneumonia recently.     Recommendations: Patient should get a follow-up CT scan of the chest at the 6 to 8-week interval.    3.  GI bleeding-stabilized.    4.  DARWIN-creatinine jumped.  Etiology is uncertain.  She has been n.p.o. for procedures.  Her last dye load was 6 days ago.  Was on Lasix.    Recommendations: Gentle hydration and morning BMP.  Hold the Lasix.    Subjective:   Chester Bautista is a(n) 84 year old female who is breathing much more comfortably, without further evidence of active bleeding, but now has worsening creatinine.    Objective:   Blood pressure 109/61, pulse 99, temperature 98.5 °F (36.9 °C), temperature source Oral, resp. rate 19, height 5' 1\" (1.549 m), weight 129 lb 6.4 oz (58.7 kg), SpO2 91%.    Physical Exam alert black female  HEENT examination is unremarkable with pupils equal round and reactive to light and accommodation.   Neck without adenopathy, thyromegaly, JVD nor bruit.   Lungs slightly diminished to auscultation and percussion.  Cardiac regular rate and rhythm no murmur.   Abdomen nontender, without hepatosplenomegaly and no mass appreciable.   Extremities without clubbing cyanosis nor edema.   Neurologic grossly intact with symmetric tone and strength and reflex.  Skin without gross abnormality     Results:     Lab Results   Component Value Date    WBC 14.5 02/29/2024    HGB 10.1  02/29/2024    HCT 32.6 02/29/2024    .0 02/29/2024    CREATSERUM 2.13 02/29/2024    BUN 36 02/29/2024     02/29/2024    K 5.4 02/29/2024     02/29/2024    CO2 20.0 02/29/2024     02/29/2024    CA 10.1 02/29/2024       Chapin Parker MD  Medical Director, Critical Care, [facility]  Medical Director, [facility]  Pager: [phone]

## 2024-02-29 NOTE — PROGRESS NOTES
Patient unable to void, bladder scanned and MD made aware of results, orders for straight cath received. patient aware of straight cath orders, attempted to toilet patient x2- no urine output, refusing straight cath x2. Patient educated on urine retention and pvr orders, MD made aware.

## 2024-02-29 NOTE — PROGRESS NOTES
[facility]  part of [facility]    Progress Note    Chester Bautista Patient Status:  Inpatient    1940 MRN [id number]   Location [facility]5W Attending Heike Sorto MD   Hosp Day # 6 PCP Heike Sorto MD     Chief Complaint: GIB.     Subjective:     Constitutional:  Positive for activity change, appetite change and fatigue. Negative for chills, diaphoresis, fever and unexpected weight change.   HENT:  Negative for trouble swallowing.    Respiratory:  Negative for apnea, cough, choking, chest tightness, shortness of breath, wheezing and stridor.    Cardiovascular:  Negative for chest pain, palpitations and leg swelling.   Gastrointestinal:  Positive for constipation (Last BM> 48 hrs. Not eatting much due to NPO.). Negative for heartburn, nausea, vomiting, abdominal pain, diarrhea, blood in stool, abdominal distention, anal bleeding and rectal pain.   Endocrine: Negative for cold intolerance, heat intolerance, polydipsia, polyphagia and polyuria.   Genitourinary:  Negative for bladder incontinence, dysuria, urgency, frequency, hematuria, flank pain and difficulty urinating.   Neurological:  Positive for dizziness and light-headedness. Negative for tremors, seizures, syncope, facial asymmetry, speech difficulty, weakness, numbness and headaches.   Psychiatric/Behavioral:  Negative for suicidal ideas, hallucinations, behavioral problems, confusion, sleep disturbance, self-injury, decreased concentration, agitation and depressed mood. The patient is not nervous/anxious and is not hyperactive.    All other systems reviewed and are negative.      Objective:   Blood pressure 109/61, pulse 99, temperature 98.5 °F (36.9 °C), temperature source Oral, resp. rate 19, height 5' 1\" (1.549 m), weight 129 lb 6.4 oz (58.7 kg), SpO2 91%.  Physical Exam  Vitals and nursing note reviewed.   Constitutional:       General: She is not in acute distress.     Appearance: Normal  appearance. She is well-groomed. She is not ill-appearing, toxic-appearing or diaphoretic.      Interventions: She is not intubated.  Eyes:      General: No scleral icterus.  Cardiovascular:      Rate and Rhythm: Normal rate and regular rhythm.      Pulses: Normal pulses.   Pulmonary:      Effort: No tachypnea, bradypnea, accessory muscle usage, prolonged expiration, respiratory distress or retractions. She is not intubated.      Breath sounds: No stridor, decreased air movement or transmitted upper airway sounds. No decreased breath sounds, wheezing, rhonchi or rales.   Chest:      Chest wall: No tenderness.   Abdominal:      General: Bowel sounds are normal. There is no distension.      Palpations: Abdomen is soft.      Tenderness: There is no abdominal tenderness. There is no right CVA tenderness, left CVA tenderness, guarding or rebound. Negative signs include Hung's sign.   Musculoskeletal:      Right lower leg: No edema.      Left lower leg: No edema.   Neurological:      Mental Status: She is alert and oriented to person, place, and time.   Psychiatric:         Mood and Affect: Mood normal.         Behavior: Behavior is cooperative.         Results:   Lab Results   Component Value Date    WBC 14.5 (H) 02/29/2024    HGB 10.1 (L) 02/29/2024    HCT 32.6 (L) 02/29/2024    .0 02/29/2024    CREATSERUM 2.13 (H) 02/29/2024    BUN 36 (H) 02/29/2024     (L) 02/29/2024    K 5.4 (H) 02/29/2024     02/29/2024    CO2 20.0 (L) 02/29/2024     (H) 02/29/2024    CA 10.1 02/29/2024    ALB 3.4 02/24/2024    ALKPHO 63 02/24/2024    BILT 0.2 02/24/2024    TP 6.4 02/24/2024    AST 18 02/24/2024    ALT <7 (L) 02/24/2024    PTT 69.9 (H) 02/28/2024    INR 0.93 12/14/2023    TSH 0.622 10/20/2023    DDIMER 1.60 (H) 02/23/2024    ESRML 91 (H) 09/14/2023    CRP 1.10 (H) 09/14/2023    MG 1.0 (LL) 02/16/2024    TROPHS 9 02/23/2024    B12 748 10/20/2023       No results found.        Assessment & Plan:     PE  (pulmonary thromboembolism) (HCC) from right lower lobe pulmonary artery to multiple segmental arteries on CT 2-23-24.   Without acute cor pulmonale.  Doppler negative for DVT on 2-23-24.   Back on Eliquis.  Monitor hemoglobin.  Not sure of duration whether will be 6 months or longer due to history of breast cancer.      Type 2 diabetes mellitus without retinopathy (HCC)  Sugars are higher.  Off oral hypoglycemics while in hospital.  Sliding scale insulin, hypoglycemic protocol, Accu-Cheks.  Will add a little bit of Levemir.      Hypertension  Generally well-controlled but on the lower limits of normal.  Spironolactone and losartan are being held due to hyperkalemia.  Monitor.      Chronic obstructive pulmonary disease, unspecified (HCC)  Stable.  Nebs as needed.  Pulmonary following patient.      ABLA (acute blood loss anemia)  History of AVMs.  Status post endoscopy.  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 Multiple gastric and duodenal AVMs. She recently underwent cautery of gastric and proximal duodenal avms in two months ago and now multiple new ones have grown. Suspect she will have chronic AVM disease given its natural course. Utility of prophylactic endoscopic treatment is not clear as she will continue to grow new ones. Suggest medical management,.).  GI following patient.  Hemoglobin improved status posttransfusion 1 unit PRBCs.  Monitor back on Eliquis.  On PPI.       Elevated white count.  May be due to GI bleed.  But more elevated than yesterday but overall decreased from initial admission.  Check UA.  Denies any respiratory symptoms.  On steroids but stable dose from prior.       Hyperkalemia.  Will hold potassium and spironolactone and losartan.  Low potassium diet.  Will monitor.  Will give some Kayexalate.  Renal to be consulted.       DARWIN.   Not sure if due to dehydration.  Stop diuretics.  Check bladder scan and renal ultrasound and UA.  Avoid nephrotoxins.   Monitor.  Getting light dose of IV fluids.  Was n.p.o. for EGD.  Plus appetite is diminished.  Check postvoid residual.  Last diet load approximately 6 days ago.       UTI.  On IV antibiotics.  Urine culture positive for Klebsiella aerogenes sensitive to ceftriaxone.       RA.  Stable on prednisone and hydroxychloroquine.  Follows up with rheum as an outpatient.        Hyperlipidemia.  On statin.       Abnormal CT chest.  With bilateral fibrosis and cystic changes.  Pulmonary following patient.    DVT prophylaxis on Eliquis.  GI prophylaxis on PPI.  CODE STATUS full.  Discussed with patient.  Discussed with daughter Brea Leyva on the phone and granddaughter in room with patient permission.  Discussed with RN taking care of patient.    **Certification      PHYSICIAN Certification of Need for Inpatient Hospitalization - Initial Certification    Patient will require inpatient services that will reasonably be expected to span two midnight's based on the clinical documentation in H+P.   Based on patients current state of illness, I anticipate that, after discharge, patient will require TBD.    Heike Sorto MD  2/29/2024

## 2024-02-29 NOTE — PLAN OF CARE
Problem: Patient Centered Care  Goal: Patient preferences are identified and integrated in the patient's plan of care  Description: Interventions:  - What would you like us to know as we care for you? From home with family  - Provide timely, complete, and accurate information to patient/family  - Incorporate patient and family knowledge, values, beliefs, and cultural backgrounds into the planning and delivery of care  - Encourage patient/family to participate in care and decision-making at the level they choose  - Honor patient and family perspectives and choices  Outcome: Progressing     Problem: CARDIOVASCULAR - ADULT  Goal: Maintains optimal cardiac output and hemodynamic stability  Description: INTERVENTIONS:  - Monitor vital signs, rhythm, and trends  - Monitor for bleeding, hypotension and signs of decreased cardiac output  - Evaluate effectiveness of vasoactive medications to optimize hemodynamic stability  - Monitor arterial and/or venous puncture sites for bleeding and/or hematoma  - Assess quality of pulses, skin color and temperature  - Assess for signs of decreased coronary artery perfusion - ex. Angina  - Evaluate fluid balance, assess for edema, trend weights  Outcome: Progressing     Problem: RESPIRATORY - ADULT  Goal: Achieves optimal ventilation and oxygenation  Description: INTERVENTIONS:  - Assess for changes in respiratory status  - Assess for changes in mentation and behavior  - Position to facilitate oxygenation and minimize respiratory effort  - Oxygen supplementation based on oxygen saturation or ABGs  - Provide Smoking Cessation handout, if applicable  - Encourage broncho-pulmonary hygiene including cough, deep breathe, Incentive Spirometry  - Assess the need for suctioning and perform as needed  - Assess and instruct to report SOB or any respiratory difficulty  - Respiratory Therapy support as indicated  - Manage/alleviate anxiety  - Monitor for signs/symptoms of CO2 retention  Outcome:  Progressing     Problem: PAIN - ADULT  Goal: Verbalizes/displays adequate comfort level or patient's stated pain goal  Description: INTERVENTIONS:  - Encourage pt to monitor pain and request assistance  - Assess pain using appropriate pain scale  - Administer analgesics based on type and severity of pain and evaluate response  - Implement non-pharmacological measures as appropriate and evaluate response  - Consider cultural and social influences on pain and pain management  - Manage/alleviate anxiety  - Utilize distraction and/or relaxation techniques  - Monitor for opioid side effects  - Notify MD/LIP if interventions unsuccessful or patient reports new pain  - Anticipate increased pain with activity and pre-medicate as appropriate  Outcome: Progressing     Problem: RISK FOR INFECTION - ADULT  Goal: Absence of fever/infection during anticipated neutropenic period  Description: INTERVENTIONS  - Monitor WBC  - Administer growth factors as ordered  - Implement neutropenic guidelines  Outcome: Progressing     Problem: SAFETY ADULT - FALL  Goal: Free from fall injury  Description: INTERVENTIONS:  - Assess pt frequently for physical needs  - Identify cognitive and physical deficits and behaviors that affect risk of falls.  - [place] fall precautions as indicated by assessment.  - Educate pt/family on patient safety including physical limitations  - Instruct pt to call for assistance with activity based on assessment  - Modify environment to reduce risk of injury  - Provide assistive devices as appropriate  - Consider OT/PT consult to assist with strengthening/mobility  - Encourage toileting schedule  Outcome: Progressing     Problem: Patient/Family Goals  Goal: Patient/Family Long Term Goal  Description: Patient's Long Term Goal: to go home    Interventions:  - - Monitor vitals  - Monitor appropriate labs  - Pain management  - Follow MD order  - Diagnostics per order  - Update/Informing patient and family on plan of  care  - Discharge planning  - See additional Care Plan goals for specific interventions  Outcome: Progressing  Goal: Patient/Family Short Term Goal  Description: Patient's Short Term Goal: to feel better    Interventions:   - - Monitor vitals  - Monitor appropriate labs  - Pain management  - Follow MD order  - Diagnostics per order  - Update/Informing patient and family on plan of care  - Discharge planning  - See additional Care Plan goals for specific interventions  Outcome: Progressing     Problem: DISCHARGE PLANNING  Goal: Discharge to home or other facility with appropriate resources  Description: INTERVENTIONS:  - Identify barriers to discharge w/pt and caregiver  - Include patient/family/discharge partner in discharge planning  - Arrange for needed discharge resources and transportation as appropriate  - Identify discharge learning needs (meds, wound care, etc)  - Arrange for interpreters to assist at discharge as needed  - Consider post-discharge preferences of patient/family/discharge partner  - Complete POLST form as appropriate  - Assess patient's ability to be responsible for managing their own health  - Refer to Case Management Department for coordinating discharge planning if the patient needs post-hospital services based on physician/LIP order or complex needs related to functional status, cognitive ability or social support system  Outcome: Progressing

## 2024-02-29 NOTE — TELEPHONE ENCOUNTER
Seen inpatient  GI AVM disease  Started on octreotide subcutaneous inpatient    GI RN -- pls arrange for discharge clinic f/u as well as start insurance approval process for     SANDOSTATIN LAR 20 MG IM MONTHLY

## 2024-03-01 ENCOUNTER — APPOINTMENT (OUTPATIENT)
Dept: HEMATOLOGY/ONCOLOGY | Facility: HOSPITAL | Age: 84
End: 2024-03-01
Attending: INTERNAL MEDICINE
Payer: MEDICARE

## 2024-03-01 ENCOUNTER — APPOINTMENT (OUTPATIENT)
Dept: ULTRASOUND IMAGING | Facility: HOSPITAL | Age: 84
End: 2024-03-01
Attending: INTERNAL MEDICINE
Payer: MEDICARE

## 2024-03-01 ENCOUNTER — APPOINTMENT (OUTPATIENT)
Dept: GENERAL RADIOLOGY | Facility: HOSPITAL | Age: 84
End: 2024-03-01
Attending: INTERNAL MEDICINE
Payer: MEDICARE

## 2024-03-01 LAB
ANION GAP SERPL CALC-SCNC: 8 MMOL/L (ref 0–18)
BASOPHILS # BLD AUTO: 0.05 X10(3) UL (ref 0–0.2)
BASOPHILS NFR BLD AUTO: 0.4 %
BILIRUB UR QL: NEGATIVE
BUN BLD-MCNC: 31 MG/DL (ref 9–23)
BUN/CREAT SERPL: 18.8 (ref 10–20)
CALCIUM BLD-MCNC: 8.1 MG/DL (ref 8.7–10.4)
CHLORIDE SERPL-SCNC: 109 MMOL/L (ref 98–112)
CLARITY UR: CLEAR
CO2 SERPL-SCNC: 20 MMOL/L (ref 21–32)
CREAT BLD-MCNC: 1.65 MG/DL
DEPRECATED RDW RBC AUTO: 53.5 FL (ref 35.1–46.3)
EGFRCR SERPLBLD CKD-EPI 2021: 30 ML/MIN/1.73M2 (ref 60–?)
EOSINOPHIL # BLD AUTO: 0.29 X10(3) UL (ref 0–0.7)
EOSINOPHIL NFR BLD AUTO: 2.5 %
ERYTHROCYTE [DISTWIDTH] IN BLOOD BY AUTOMATED COUNT: 16.4 % (ref 11–15)
GLUCOSE BLD-MCNC: 163 MG/DL (ref 70–99)
GLUCOSE BLDC GLUCOMTR-MCNC: 171 MG/DL (ref 70–99)
GLUCOSE BLDC GLUCOMTR-MCNC: 179 MG/DL (ref 70–99)
GLUCOSE BLDC GLUCOMTR-MCNC: 199 MG/DL (ref 70–99)
GLUCOSE BLDC GLUCOMTR-MCNC: 263 MG/DL (ref 70–99)
GLUCOSE UR-MCNC: NORMAL MG/DL
HCT VFR BLD AUTO: 26.2 %
HCT VFR BLD AUTO: 29.5 %
HCT VFR BLD AUTO: 30 %
HGB BLD-MCNC: 8.2 G/DL
HGB BLD-MCNC: 9.3 G/DL
HGB BLD-MCNC: 9.5 G/DL
HGB UR QL STRIP.AUTO: NEGATIVE
IMM GRANULOCYTES # BLD AUTO: 0.11 X10(3) UL (ref 0–1)
IMM GRANULOCYTES NFR BLD: 0.9 %
KETONES UR-MCNC: NEGATIVE MG/DL
LEUKOCYTE ESTERASE UR QL STRIP.AUTO: NEGATIVE
LYMPHOCYTES # BLD AUTO: 1.46 X10(3) UL (ref 1–4)
LYMPHOCYTES NFR BLD AUTO: 12.4 %
MCH RBC QN AUTO: 28.6 PG (ref 26–34)
MCHC RBC AUTO-ENTMCNC: 31.3 G/DL (ref 31–37)
MCV RBC AUTO: 91.3 FL
MONOCYTES # BLD AUTO: 0.98 X10(3) UL (ref 0.1–1)
MONOCYTES NFR BLD AUTO: 8.3 %
NEUTROPHILS # BLD AUTO: 8.88 X10 (3) UL (ref 1.5–7.7)
NEUTROPHILS # BLD AUTO: 8.88 X10(3) UL (ref 1.5–7.7)
NEUTROPHILS NFR BLD AUTO: 75.5 %
NITRITE UR QL STRIP.AUTO: NEGATIVE
OSMOLALITY SERPL CALC.SUM OF ELEC: 294 MOSM/KG (ref 275–295)
PH UR: 5 [PH] (ref 5–8)
PLATELET # BLD AUTO: 294 10(3)UL (ref 150–450)
POTASSIUM SERPL-SCNC: 4.3 MMOL/L (ref 3.5–5.1)
PROT UR-MCNC: 30 MG/DL
RBC # BLD AUTO: 2.87 X10(6)UL
SODIUM SERPL-SCNC: 137 MMOL/L (ref 136–145)
SP GR UR STRIP: 1.01 (ref 1–1.03)
UROBILINOGEN UR STRIP-ACNC: NORMAL
WBC # BLD AUTO: 11.8 X10(3) UL (ref 4–11)

## 2024-03-01 PROCEDURE — 76770 US EXAM ABDO BACK WALL COMP: CPT | Performed by: INTERNAL MEDICINE

## 2024-03-01 PROCEDURE — 74018 RADEX ABDOMEN 1 VIEW: CPT | Performed by: INTERNAL MEDICINE

## 2024-03-01 PROCEDURE — 99233 SBSQ HOSP IP/OBS HIGH 50: CPT | Performed by: INTERNAL MEDICINE

## 2024-03-01 PROCEDURE — 99232 SBSQ HOSP IP/OBS MODERATE 35: CPT | Performed by: INTERNAL MEDICINE

## 2024-03-01 NOTE — PLAN OF CARE
A&Ox4. Pt ambulates standby with walker, monitoring blood glucose levels per order- ACHS, bladder scanned and straight cath per protocol- nursing communication to straight cath q6 and teach pt CIC per urology, tolerating diet, on room air. Frequent rounding by nursing staff. Safety precautions maintained/call light within reach.    Problem: Patient Centered Care  Goal: Patient preferences are identified and integrated in the patient's plan of care  Description: Interventions:  - What would you like us to know as we care for you? From home with family  - Provide timely, complete, and accurate information to patient/family  - Incorporate patient and family knowledge, values, beliefs, and cultural backgrounds into the planning and delivery of care  - Encourage patient/family to participate in care and decision-making at the level they choose  - Honor patient and family perspectives and choices  Outcome: Progressing     Problem: CARDIOVASCULAR - ADULT  Goal: Maintains optimal cardiac output and hemodynamic stability  Description: INTERVENTIONS:  - Monitor vital signs, rhythm, and trends  - Monitor for bleeding, hypotension and signs of decreased cardiac output  - Evaluate effectiveness of vasoactive medications to optimize hemodynamic stability  - Monitor arterial and/or venous puncture sites for bleeding and/or hematoma  - Assess quality of pulses, skin color and temperature  - Assess for signs of decreased coronary artery perfusion - ex. Angina  - Evaluate fluid balance, assess for edema, trend weights  Outcome: Progressing     Problem: RESPIRATORY - ADULT  Goal: Achieves optimal ventilation and oxygenation  Description: INTERVENTIONS:  - Assess for changes in respiratory status  - Assess for changes in mentation and behavior  - Position to facilitate oxygenation and minimize respiratory effort  - Oxygen supplementation based on oxygen saturation or ABGs  - Provide Smoking Cessation handout, if applicable  - Encourage  broncho-pulmonary hygiene including cough, deep breathe, Incentive Spirometry  - Assess the need for suctioning and perform as needed  - Assess and instruct to report SOB or any respiratory difficulty  - Respiratory Therapy support as indicated  - Manage/alleviate anxiety  - Monitor for signs/symptoms of CO2 retention  Outcome: Progressing     Problem: PAIN - ADULT  Goal: Verbalizes/displays adequate comfort level or patient's stated pain goal  Description: INTERVENTIONS:  - Encourage pt to monitor pain and request assistance  - Assess pain using appropriate pain scale  - Administer analgesics based on type and severity of pain and evaluate response  - Implement non-pharmacological measures as appropriate and evaluate response  - Consider cultural and social influences on pain and pain management  - Manage/alleviate anxiety  - Utilize distraction and/or relaxation techniques  - Monitor for opioid side effects  - Notify MD/LIP if interventions unsuccessful or patient reports new pain  - Anticipate increased pain with activity and pre-medicate as appropriate  Outcome: Progressing     Problem: RISK FOR INFECTION - ADULT  Goal: Absence of fever/infection during anticipated neutropenic period  Description: INTERVENTIONS  - Monitor WBC  - Administer growth factors as ordered  - Implement neutropenic guidelines  Outcome: Progressing     Problem: SAFETY ADULT - FALL  Goal: Free from fall injury  Description: INTERVENTIONS:  - Assess pt frequently for physical needs  - Identify cognitive and physical deficits and behaviors that affect risk of falls.  - [place] fall precautions as indicated by assessment.  - Educate pt/family on patient safety including physical limitations  - Instruct pt to call for assistance with activity based on assessment  - Modify environment to reduce risk of injury  - Provide assistive devices as appropriate  - Consider OT/PT consult to assist with strengthening/mobility  - Encourage toileting  schedule  Outcome: Progressing     Problem: DISCHARGE PLANNING  Goal: Discharge to home or other facility with appropriate resources  Description: INTERVENTIONS:  - Identify barriers to discharge w/pt and caregiver  - Include patient/family/discharge partner in discharge planning  - Arrange for needed discharge resources and transportation as appropriate  - Identify discharge learning needs (meds, wound care, etc)  - Arrange for interpreters to assist at discharge as needed  - Consider post-discharge preferences of patient/family/discharge partner  - Complete POLST form as appropriate  - Assess patient's ability to be responsible for managing their own health  - Refer to Case Management Department for coordinating discharge planning if the patient needs post-hospital services based on physician/LIP order or complex needs related to functional status, cognitive ability or social support system  Outcome: Progressing

## 2024-03-01 NOTE — PROGRESS NOTES
[facility]  part of [facility]     Progress Note    Chester Bautista Patient Status:  Inpatient    1940 MRN [id number]   Location [facility]5W Attending Heike Sorto MD   Hosp Day # 7 PCP Heike Sorto MD       Subjective:   Patient seen and examined.  Resting in bed.  Denies significant dyspnea    Objective:   Blood pressure 95/62, pulse 97, temperature 98.3 °F (36.8 °C), temperature source Oral, resp. rate 17, height 5' 1\" (1.549 m), weight 129 lb 6.4 oz (58.7 kg), SpO2 94%.  Intake/Output:   Last 3 shifts: I/O last 3 completed shifts:  In: 1028 [P.O.:898; I.V.:30; IV PIGGYBACK:100]  Out: 1725 [Urine:1725]   This shift: I/O this shift:  In: 358 [P.O.:358]  Out: 100 [Urine:100]     Vent Settings:      Hemodynamic parameters (last 24 hours):      Scheduled Meds:   Current Facility-Administered Medications   Medication Dose Route Frequency    octreoTIDe (SandoSTATIN) 100 mcg/mL injection 100 mcg  100 mcg Subcutaneous Q8H    insulin degludec 100 units/mL flextouch 8 Units  8 Units Subcutaneous Nightly    ipratropium-albuterol (Duoneb) 0.5-2.5 (3) MG/3ML inhalation solution 3 mL  3 mL Nebulization Q6H PRN    tamsulosin (Flomax) cap 0.4 mg  0.4 mg Oral Daily    pantoprazole (Protonix) 40 mg in sodium chloride 0.9% PF 10 mL IV push  40 mg Intravenous BID    apixaban (Eliquis) tab 10 mg  10 mg Oral BID    Followed by    [START ON 3/6/2024] apixaban (Eliquis) tab 5 mg  5 mg Oral BID    cefTRIAXone (Rocephin) 1 g in D5W 100 mL IVPB-ADD  1 g Intravenous Q24H    traMADol (Ultram) tab 50 mg  50 mg Oral Q12H PRN    glucose (Dex4) 15 GM/59ML oral liquid 15 g  15 g Oral Q15 Min PRN    Or    glucose (Glutose) 40% oral gel 15 g  15 g Oral Q15 Min PRN    Or    glucose-vitamin C (Dex-4) chewable tab 4 tablet  4 tablet Oral Q15 Min PRN    Or    dextrose 50% injection 50 mL  50 mL Intravenous Q15 Min PRN    Or    glucose (Dex4) 15 GM/59ML oral liquid 30 g  30 g Oral Q15 Min PRN     Or    glucose (Glutose) 40% oral gel 30 g  30 g Oral Q15 Min PRN    Or    glucose-vitamin C (Dex-4) chewable tab 8 tablet  8 tablet Oral Q15 Min PRN    insulin aspart (NovoLOG) 100 Units/mL FlexPen 1-7 Units  1-7 Units Subcutaneous TID CC    cyclobenzaprine (Flexeril) tab 10 mg  10 mg Oral Nightly    acetaminophen (Tylenol Extra Strength) tab 500 mg  500 mg Oral Q6H PRN    albuterol (Ventolin HFA) 108 (90 Base) MCG/ACT inhaler 2 puff  2 puff Inhalation Q4H PRN    carvedilol (Coreg) tab 25 mg  25 mg Oral BID with meals    fluticasone furoate-vilanterol (Breo Ellipta) 100-25 MCG/ACT inhaler 1 puff  1 puff Inhalation Daily    glycopyrrolate (Robinul) tab 1 mg  1 mg Oral TID    hydroxychloroquine (Plaquenil) tab 200 mg  200 mg Oral Daily    cetirizine (ZyrTEC) tab 5 mg  5 mg Oral Daily    montelukast (Singulair) tab 10 mg  10 mg Oral Nightly    NIFEdipine ER (Procardia-XL) 24 hr tab 60 mg  60 mg Oral Daily    potassium chloride (K-Dur) tab 20 mEq  20 mEq Oral BID    predniSONE (Deltasone) tab 5 mg  5 mg Oral Daily    rosuvastatin (Crestor) tab 10 mg  10 mg Oral Nightly    torsemide (Demadex) tab 20 mg  20 mg Oral Daily    losartan (Cozaar) tab 50 mg  50 mg Oral Daily    melatonin tab 3 mg  3 mg Oral Nightly PRN    polyethylene glycol (PEG 3350) (Miralax) 17 g oral packet 17 g  17 g Oral Daily PRN    sennosides (Senokot) tab 17.2 mg  17.2 mg Oral Nightly PRN    bisacodyl (Dulcolax) 10 MG rectal suppository 10 mg  10 mg Rectal Daily PRN    ondansetron (Zofran) 4 MG/2ML injection 4 mg  4 mg Intravenous Q6H PRN    metoclopramide (Reglan) 5 mg/mL injection 5 mg  5 mg Intravenous Q8H PRN    benzonatate (Tessalon) cap 200 mg  200 mg Oral TID PRN    guaiFENesin (Robitussin) 100 MG/5 ML oral liquid 200 mg  200 mg Oral Q4H PRN    glycerin-hypromellose- (Artifical Tears) 0.2-0.2-1 % ophthalmic solution 1 drop  1 drop Both Eyes QID PRN    sodium chloride (Saline Mist) 0.65 % nasal solution 1 spray  1 spray Each Nare Q3H PRN        Continuous Infusions:     Physical Exam  Constitutional: no acute distress  Eyes: PERRL  ENT: nares pateint  Neck: supple, no JVD  Cardio: RRR, S1 S2  Respiratory: clear to auscultation bilaterally, no wheezing, rales, rhonchi, crackles  GI: abdomen soft, non tender, active bowel sounds, no organomegaly  Extremities: no clubbing, cyanosis, edema  Neurologic: no gross motor deficits  Skin: warm, dry      Results:     Lab Results   Component Value Date    WBC 11.8 03/01/2024    HGB 9.5 03/01/2024    HCT 30.0 03/01/2024    .0 03/01/2024    CREATSERUM 1.65 03/01/2024    BUN 31 03/01/2024     03/01/2024    K 4.3 03/01/2024     03/01/2024    CO2 20.0 03/01/2024     03/01/2024    CA 8.1 03/01/2024       No results found.          Assessment   1.  Acute pulmonary embolism  2.  GI bleed, resolved  2.  Bronchiectasis  4.  UTI     Plan   -Presented with evidence of acute pulmonary embolism.  -Underlying history of GI bleeding with AV malformation.  -Hemoglobin stable at this time.  -Transition to anticoagulation with Xarelto from pulmonary perspective  -Okay for discharge from pulmonary standpoint    Damon Williamson DO  Pulmonary Critical Care Medicine  [facility]

## 2024-03-01 NOTE — PLAN OF CARE
Received pt in stable condition, on RA. VSS. Pt tolerated all scheduled medications, no complications noted. PRN Tramadol given per pt's request for c/o headache. Continuous telemetry & pulse oximetry monitoring in place for pt. Frequent rounding on pt. All fall & safety precautions in place for pt. Pt had episode of urine retention, pt voided out 200 ml of urine, post void residual scan showed 432 ml of urine in bladder. This RN straight cath pt, 425 ml of urine obtained. Pt verbalized having no pain nor tenderness in abdomen. Call light placed within pt.'s reach. Will continue to monitor for any new, acute changes.         Problem: Patient Centered Care  Goal: Patient preferences are identified and integrated in the patient's plan of care  Description: Interventions:  - What would you like us to know as we care for you? From home with family  - Provide timely, complete, and accurate information to patient/family  - Incorporate patient and family knowledge, values, beliefs, and cultural backgrounds into the planning and delivery of care  - Encourage patient/family to participate in care and decision-making at the level they choose  - Honor patient and family perspectives and choices  Outcome: Progressing     Problem: CARDIOVASCULAR - ADULT  Goal: Maintains optimal cardiac output and hemodynamic stability  Description: INTERVENTIONS:  - Monitor vital signs, rhythm, and trends  - Monitor for bleeding, hypotension and signs of decreased cardiac output  - Evaluate effectiveness of vasoactive medications to optimize hemodynamic stability  - Monitor arterial and/or venous puncture sites for bleeding and/or hematoma  - Assess quality of pulses, skin color and temperature  - Assess for signs of decreased coronary artery perfusion - ex. Angina  - Evaluate fluid balance, assess for edema, trend weights  Outcome: Progressing     Problem: RESPIRATORY - ADULT  Goal: Achieves optimal ventilation and oxygenation  Description:  INTERVENTIONS:  - Assess for changes in respiratory status  - Assess for changes in mentation and behavior  - Position to facilitate oxygenation and minimize respiratory effort  - Oxygen supplementation based on oxygen saturation or ABGs  - Provide Smoking Cessation handout, if applicable  - Encourage broncho-pulmonary hygiene including cough, deep breathe, Incentive Spirometry  - Assess the need for suctioning and perform as needed  - Assess and instruct to report SOB or any respiratory difficulty  - Respiratory Therapy support as indicated  - Manage/alleviate anxiety  - Monitor for signs/symptoms of CO2 retention  Outcome: Progressing     Problem: PAIN - ADULT  Goal: Verbalizes/displays adequate comfort level or patient's stated pain goal  Description: INTERVENTIONS:  - Encourage pt to monitor pain and request assistance  - Assess pain using appropriate pain scale  - Administer analgesics based on type and severity of pain and evaluate response  - Implement non-pharmacological measures as appropriate and evaluate response  - Consider cultural and social influences on pain and pain management  - Manage/alleviate anxiety  - Utilize distraction and/or relaxation techniques  - Monitor for opioid side effects  - Notify MD/LIP if interventions unsuccessful or patient reports new pain  - Anticipate increased pain with activity and pre-medicate as appropriate  Outcome: Progressing     Problem: RISK FOR INFECTION - ADULT  Goal: Absence of fever/infection during anticipated neutropenic period  Description: INTERVENTIONS  - Monitor WBC  - Administer growth factors as ordered  - Implement neutropenic guidelines  Outcome: Progressing     Problem: DISCHARGE PLANNING  Goal: Discharge to home or other facility with appropriate resources  Description: INTERVENTIONS:  - Identify barriers to discharge w/pt and caregiver  - Include patient/family/discharge partner in discharge planning  - Arrange for needed discharge resources and  transportation as appropriate  - Identify discharge learning needs (meds, wound care, etc)  - Arrange for interpreters to assist at discharge as needed  - Consider post-discharge preferences of patient/family/discharge partner  - Complete POLST form as appropriate  - Assess patient's ability to be responsible for managing their own health  - Refer to Case Management Department for coordinating discharge planning if the patient needs post-hospital services based on physician/LIP order or complex needs related to functional status, cognitive ability or social support system  Outcome: Progressing     Problem: SAFETY ADULT - FALL  Goal: Free from fall injury  Description: INTERVENTIONS:  - Assess pt frequently for physical needs  - Identify cognitive and physical deficits and behaviors that affect risk of falls.  - [place] fall precautions as indicated by assessment.  - Educate pt/family on patient safety including physical limitations  - Instruct pt to call for assistance with activity based on assessment  - Modify environment to reduce risk of injury  - Provide assistive devices as appropriate  - Consider OT/PT consult to assist with strengthening/mobility  - Encourage toileting schedule  Outcome: Progressing     Problem: Patient/Family Goals  Goal: Patient/Family Long Term Goal  Description: Patient's Long Term Goal: to go home    Interventions:  - - Monitor vitals  - Monitor appropriate labs  - Pain management  - Follow MD order  - Diagnostics per order  - Update/Informing patient and family on plan of care  - Discharge planning  - See additional Care Plan goals for specific interventions  Outcome: Progressing  Goal: Patient/Family Short Term Goal  Description: Patient's Short Term Goal: to feel better    Interventions:   - - Monitor vitals  - Monitor appropriate labs  - Pain management  - Follow MD order  - Diagnostics per order  - Update/Informing patient and family on plan of care  - Discharge planning  - See  additional Care Plan goals for specific interventions  Outcome: Progressing

## 2024-03-01 NOTE — CM/SW NOTE
RACHAEL followed up on discharge planning.    Pt now on room air which is her baseline.    Updates sent to [facility] (current pt).  Per RN note, pt is up and ambulatory in the room.    Pt retaining urine- straight cath used on night shift.    GI has signed off case.    Update 12:00 PM    RACHAEL met with pt's daughter Brea bedside.  RACHAEL provided [employer] [facility] caregiver resources and explained private pay.  RACHAEL asked Brea for consent to make referral for [facility].  RACHAEL let Brea know there is likely a wait for an evaluation due to volume- Brea v/u.    [facility] to [facility] for an evaluation.    Brea interested in [employer] on Wheels for pt- RACHAEL left request in VM for DCSS.    PLAN: DC home w/[facility]/referral to [facility] pending    / to remain available for support and/or discharge planning.     Heike Guerrier MSW, LSW [phone]

## 2024-03-01 NOTE — SPIRITUAL CARE NOTE
Spiritual Care Visit Note    Patient Name: Chester Bautista Date of Spiritual Care Visit: 24   : 1940 Primary Dx: Other pulmonary embolism without acute cor pulmonale (HCC)       Referred By: Referral From:     Spiritual Care Taxonomy:    Intended Effects: Establish rapport and connectedness    Methods: Collaborate with care team member;Offer support    Interventions: Active listening;Ask guided questions;Silent prayer    Visit Type/Summary:     - Spiritual Care: Consulted with RN prior to visit. Offered empathic listening and emotional support. Provided information regarding how to contact Spiritual Care and left a Spiritual Care information card. Daughter at bedside. No need at this time.    Spiritual Care support can be requested via an Epic consult. For urgent/immediate needs, please contact the On Call  at: [place]: ext [phone]    WANDA Suggs   [id number]

## 2024-03-01 NOTE — CONSULTS
[facility]  part of [facility]  Report of Initial Consultation    Chester Bautista Patient Status:  Inpatient    1940 MRN [id number]   Location [facility]5W Attending Heike Sorto MD   Hosp Day # 7 PCP Heike Sorto MD     Date of Admission: 2024  Date of Consult: 24   Reason for Consultation:   Urinary retention  History of Present Illness:   Patient is a 84 year old female with a history of COPD, hypertension, was hospitalized 2 weeks ago for shortness of breath.  At that time chest x-ray showed vague opacities consistent with multifocal pneumonia versus heart failure.  Cardiology was consulted she was discharged after being on antibiotics.  Presented to the emergency department on 2024.  Because of continued shortness of breath with any exertion.  CTA chest was completed which showed acute pulmonary emboli extending from the right lower lobar pulmonary artery to multiple segmental arteries.    During this admission on 2024 she was found to have a urinary tract infection greater than 100,000 CFU per mL of Klebsiella.  She is receiving antibiotics.  She has developed urinary retention per nursing patient is able to urinate and per patient patient is urinating.  Nursing reports that she urinated 750 mL on her own this morning.  She has had some postvoid residual scans that were elevated, today for example at 1238 she had a PVR of 506 mL yesterday at 2300 she had a PVR of 432 mL.  The patient denies any abdominal pain, fever, chills, hematuria.  She does admit to constipation.  She had a x-ray of the abdomen completed which showed moderate to large amount of stool predominantly in the left colon consistent with constipation/fecal retention.  Serum creatinine is 1.55  WBC is 11.8    Past Medical History:  Past Medical History:   Diagnosis Date    Anxiety state     Cancer (HCC)     breast cancer 2018    Chronic  obstructive pulmonary disease, unspecified (HCC) 05/04/2023    Chronic obstructive pulmonary disease, unspecified (HCC) 05/04/2023    COPD (chronic obstructive pulmonary disease) (HCC)     Diabetes (HCC)     Essential hypertension     Exposure to medical diagnostic radiation     High blood pressure     High cholesterol     History of blood transfusion 07/2022    low hemoglobin    Osteoarthritis     PONV (postoperative nausea and vomiting)     Pulmonary embolism (HCC)     Pulmonary emphysema (HCC)     Rheumatoid arthritis (HCC)        Past Surgical History:  Past Surgical History:   Procedure Laterality Date    CATARACT EXTRACTION W/  INTRAOCULAR LENS IMPLANT Bilateral 2017    Dr in [facility]     COLONOSCOPY      COLONOSCOPY N/A 07/21/2022    Procedure: COLONOSCOPY;  Surgeon: Mikey Garcia MD;  Location: [facility] ENDOSCOPY    COLONOSCOPY N/A 06/15/2023    Procedure: COLONOSCOPY;  Surgeon: Mikey Garcia MD;  Location: [facility] ENDOSCOPY    CORRECT BUNION,SIMPLE      right foot  1993    EGD  12/21/2023    Dr. Garcia; Duodenal AVM's x4 cauterized    HYSTERECTOMY      1972, no abnormal Paps, due to heavy bleeding with fibroids.  Not sure if it had bilateral salpingo-oophorectomy.    IR ANEURYSM REPAIRM  2010    Computer assisted volumetric craniofomy with clipping of anterior cerebral artery.    LUMPECTOMY RIGHT      RADIATION RIGHT      ROTATOR CUFF REPAIR      1993    TOTAL KNEE REPLACEMENT Right 2010    TOTAL KNEE REPLACEMENT Left 07/02/2015    TRANSORAL INCISIONLESS FUNDOPLICATION - INTERNAL  01/25/2012 Fredy fundoplication at [facility] Dr. Fournier.    Fredy fundoplication at [facility] Dr. Fournier.    TRIGGER FINGER RELEASE      left hand  2005    YAG CAPSULOTOMY - OU - BOTH EYES Bilateral 09/2021    done in [place]       Family History:  Family History   Problem Relation Age of Onset    Heart Surgery Mother         Leaky valve at 39 y/o.     Prostate Cancer Brother     Cancer Brother      Diabetes Maternal Grandmother     Diabetes Paternal Aunt     Breast Cancer Self 78    Breast Cancer Niece         before 50    Macular degeneration Neg     Glaucoma Neg        Social History:  Social History     Socioeconomic History    Marital status:    Tobacco Use    Smoking status: Former     Packs/day: .25     Types: Cigarettes     Quit date:      Years since quittin.1    Smokeless tobacco: Never    Tobacco comments:     [id number]   Vaping Use    Vaping Use: Never used   Substance and Sexual Activity    Alcohol use: Never    Drug use: Never   Social History Narrative    Just moved in from [place].   passed away and that is why moved from [place] to be with daughter who lives in [place].  Currently lives with daughter.     Social Determinants of Health     Financial Resource Strain: Low Risk  (2024)    Financial Resource Strain     Difficulty of Paying Living Expenses: Not hard at all     Med Affordability: No   Food Insecurity: No Food Insecurity (2024)    Food Insecurity     Food Insecurity: Never true   Transportation Needs: No Transportation Needs (2024)    Transportation Needs     Lack of Transportation: No   Housing Stability: Low Risk  (2024)    Housing Stability     Housing Instability: No           Current Medications:  Current Facility-Administered Medications   Medication Dose Route Frequency    iron sucrose (Venofer) 20 mg/mL injection 200 mg  200 mg Intravenous Once    octreoTIDe (SandoSTATIN) 100 mcg/mL injection 100 mcg  100 mcg Subcutaneous Q8H    insulin degludec 100 units/mL flextouch 8 Units  8 Units Subcutaneous Nightly    ipratropium-albuterol (Duoneb) 0.5-2.5 (3) MG/3ML inhalation solution 3 mL  3 mL Nebulization Q6H PRN    tamsulosin (Flomax) cap 0.4 mg  0.4 mg Oral Daily    pantoprazole (Protonix) 40 mg in sodium chloride 0.9% PF 10 mL IV push  40 mg Intravenous BID    apixaban (Eliquis) tab 10 mg  10 mg Oral BID    Followed by     [START ON 3/6/2024] apixaban (Eliquis) tab 5 mg  5 mg Oral BID    cefTRIAXone (Rocephin) 1 g in D5W 100 mL IVPB-ADD  1 g Intravenous Q24H    traMADol (Ultram) tab 50 mg  50 mg Oral Q12H PRN    glucose (Dex4) 15 GM/59ML oral liquid 15 g  15 g Oral Q15 Min PRN    Or    glucose (Glutose) 40% oral gel 15 g  15 g Oral Q15 Min PRN    Or    glucose-vitamin C (Dex-4) chewable tab 4 tablet  4 tablet Oral Q15 Min PRN    Or    dextrose 50% injection 50 mL  50 mL Intravenous Q15 Min PRN    Or    glucose (Dex4) 15 GM/59ML oral liquid 30 g  30 g Oral Q15 Min PRN    Or    glucose (Glutose) 40% oral gel 30 g  30 g Oral Q15 Min PRN    Or    glucose-vitamin C (Dex-4) chewable tab 8 tablet  8 tablet Oral Q15 Min PRN    insulin aspart (NovoLOG) 100 Units/mL FlexPen 1-7 Units  1-7 Units Subcutaneous TID CC    cyclobenzaprine (Flexeril) tab 10 mg  10 mg Oral Nightly    acetaminophen (Tylenol Extra Strength) tab 500 mg  500 mg Oral Q6H PRN    albuterol (Ventolin HFA) 108 (90 Base) MCG/ACT inhaler 2 puff  2 puff Inhalation Q4H PRN    carvedilol (Coreg) tab 25 mg  25 mg Oral BID with meals    fluticasone furoate-vilanterol (Breo Ellipta) 100-25 MCG/ACT inhaler 1 puff  1 puff Inhalation Daily    glycopyrrolate (Robinul) tab 1 mg  1 mg Oral TID    hydroxychloroquine (Plaquenil) tab 200 mg  200 mg Oral Daily    cetirizine (ZyrTEC) tab 5 mg  5 mg Oral Daily    montelukast (Singulair) tab 10 mg  10 mg Oral Nightly    NIFEdipine ER (Procardia-XL) 24 hr tab 60 mg  60 mg Oral Daily    potassium chloride (K-Dur) tab 20 mEq  20 mEq Oral BID    predniSONE (Deltasone) tab 5 mg  5 mg Oral Daily    rosuvastatin (Crestor) tab 10 mg  10 mg Oral Nightly    torsemide (Demadex) tab 20 mg  20 mg Oral Daily    losartan (Cozaar) tab 50 mg  50 mg Oral Daily    melatonin tab 3 mg  3 mg Oral Nightly PRN    polyethylene glycol (PEG 3350) (Miralax) 17 g oral packet 17 g  17 g Oral Daily PRN    sennosides (Senokot) tab 17.2 mg  17.2 mg Oral Nightly PRN    bisacodyl  (Dulcolax) 10 MG rectal suppository 10 mg  10 mg Rectal Daily PRN    ondansetron (Zofran) 4 MG/2ML injection 4 mg  4 mg Intravenous Q6H PRN    metoclopramide (Reglan) 5 mg/mL injection 5 mg  5 mg Intravenous Q8H PRN    benzonatate (Tessalon) cap 200 mg  200 mg Oral TID PRN    guaiFENesin (Robitussin) 100 MG/5 ML oral liquid 200 mg  200 mg Oral Q4H PRN    glycerin-hypromellose- (Artifical Tears) 0.2-0.2-1 % ophthalmic solution 1 drop  1 drop Both Eyes QID PRN    sodium chloride (Saline Mist) 0.65 % nasal solution 1 spray  1 spray Each Nare Q3H PRN       Allergies:   Allergies   Allergen Reactions    Celebrex [Celecoxib] PALPITATIONS    Penicillins ITCHING and SWELLING    Metformin And Related UNKNOWN    Olmesartan UNKNOWN         Review of Systems:   Pertinent positives and negatives per HPI. A 12 point ROS was performed and is otherwise negative.       Physical Exam:   Vital Signs: Blood pressure 95/62, pulse 97, temperature 98.3 °F (36.8 °C), temperature source Oral, resp. rate 17, height 5' 1\" (1.549 m), weight 129 lb 6.4 oz (58.7 kg), SpO2 94%.  General: No acute distress. Alert and oriented x 3  HEENT: Moist mucous membranes  Respiratory: Breathing comfortably  Abdomen: soft non tender. No distention   Neurologic: No focal neurological deficits.  Integument: No lesions. No erythema.  Psychiatric: Appropriate mood and affect.    Results:     Laboratory Data:  Recent Labs   Lab 02/28/24  0527 02/29/24  0333 03/01/24  0634 03/01/24  1302   WBC 14.0* 14.5* 11.8*  --    HGB 9.9* 10.1* 8.2* 9.5*   HCT 31.0* 32.6* 26.2* 30.0*   .0 362.0 294.0  --      Recent Labs   Lab 02/29/24  0333 02/29/24  1430 03/01/24  0634   * 134* 137   K 5.4* 5.3* 4.3    104 109   CO2 20.0* 21.0 20.0*   BUN 36* 36* 31*   CREATSERUM 2.13* 2.08* 1.65*   * 137* 163*   CA 10.1 9.6 8.1*     Recent Labs   Lab 02/29/24  2333   COLORUR Light-Yellow   CLARITY Clear   SPECGRAVITY 1.009   GLUUR Normal   BILUR Negative    KETUR Negative   BLOODURINE Negative   PHURINE 5.0   PROUR 30*   UROBILINOGEN Normal   NITRITE Negative   LEUUR Negative   WBCUR 1-5   RBCUR 0-2   BACUR Rare*   EPIUR Few*     Hospital Encounter on 02/23/24   1. Urine Culture, Routine     Status: Abnormal    Collection Time: 02/25/24  7:59 AM    Specimen: Urine, clean catch   Result Value Ref Range    Urine Culture (A) N/A     >100,000 CFU/ML Klebsiella (Enterobacter) aerogenes       Susceptibility    Klebsiella (Enterobacter) aerogenes -  (no method available)     Cefazolin >=64 Resistant      Cefepime <=1 Sensitive      Ceftriaxone <=1 Sensitive      Ciprofloxacin <=0.25 Sensitive      Gentamicin <=1 Sensitive      Meropenem <=0.25 Sensitive      Levofloxacin <=0.12 Sensitive      Nitrofurantoin 128 Resistant      Piperacillin + Tazobactam 8 Sensitive      Trimethoprim/Sulfa <=20 Sensitive         Imaging:  XR ABDOMEN (1 VIEW) (CPT=74018)    Result Date: 3/1/2024  CONCLUSION:  1. Moderate-large amount of stool predominantly in the left colon consistent constipation/fecal retention 2. Nonspecific nonobstructive abdominal gas pattern. 3. Vascular calcifications noted.    Dictated by (CST): Louis Moreno MD on 3/01/2024 at 2:10 PM     Finalized by (CST): Louis Moreno MD on 3/01/2024 at 2:14 PM            Impression:   84 year old female found to have pulmonary emboli.  During admission found to also have a urinary tract infection for Klebsiella, currently being treated with ceftriaxone IV.  The patient also has constipation.  A x-ray abdomen from March 1, 2024 shows moderate to large amount of stool predominantly in the left colon consistent with constipation/fecal retention.  The patient developed urinary retention.  She is able to urinate on her own per nursing this morning 3/1/2024 she did put out 750 mL.  Has been started on Flomax.    Recommendations:  1) maximize bowel regimen as constipation can contribute to urinary retention.  Avoid  nephrotoxic medications.  Continue Flomax 0.4 mg p.o. daily.  2) patient is able to urinate on her own and does have occasional episodes of good output teach patient CIC.  If unable to teach CIC then place a Alonso catheter with plans for voiding trial in 7 to 10 days.  3) treat UTI, ABX per primary.    Thank you for allowing me to participate in the care of your patient.     Diego Betancourt PA-C  3/1/2024

## 2024-03-01 NOTE — PROGRESS NOTES
[facility]  part of [facility]    Progress Note    Chester Bautista Patient Status:  Inpatient    1940 MRN [id number]   Location [facility]5W Attending Heike Sorto MD   Hosp Day # 7 PCP Heike Sorto MD     Chief Complaint: GIB.     Subjective:     Constitutional:  Positive for activity change, appetite change and fatigue. Negative for chills, diaphoresis, fever and unexpected weight change.   HENT:  Negative for trouble swallowing.    Respiratory:  Negative for apnea, cough, choking, chest tightness, shortness of breath, wheezing and stridor.    Cardiovascular:  Negative for chest pain, palpitations and leg swelling.   Gastrointestinal:  Positive for constipation (Last BM> 48 hrs. Not eatting much due to NPO.). Negative for heartburn, nausea, vomiting, abdominal pain, diarrhea, blood in stool, abdominal distention, anal bleeding and rectal pain.   Endocrine: Negative for cold intolerance, heat intolerance, polydipsia, polyphagia and polyuria.   Genitourinary:  Negative for bladder incontinence, dysuria, urgency, frequency, hematuria, flank pain and difficulty urinating.   Neurological:  Positive for dizziness and light-headedness. Negative for tremors, seizures, syncope, facial asymmetry, speech difficulty, weakness, numbness and headaches.   Psychiatric/Behavioral:  Negative for suicidal ideas, hallucinations, behavioral problems, confusion, sleep disturbance, self-injury, decreased concentration, agitation and depressed mood. The patient is not nervous/anxious and is not hyperactive.    All other systems reviewed and are negative.      Objective:   Blood pressure 117/54, pulse 88, temperature 98.2 °F (36.8 °C), temperature source Oral, resp. rate 16, height 5' 1\" (1.549 m), weight 129 lb 6.4 oz (58.7 kg), SpO2 90%.  Physical Exam  Vitals and nursing note reviewed.   Constitutional:       General: She is not in acute distress.     Appearance: Normal  appearance. She is well-groomed. She is not ill-appearing, toxic-appearing or diaphoretic.      Interventions: She is not intubated.  Eyes:      General: No scleral icterus.  Cardiovascular:      Rate and Rhythm: Normal rate and regular rhythm.      Pulses: Normal pulses.   Pulmonary:      Effort: No tachypnea, bradypnea, accessory muscle usage, prolonged expiration, respiratory distress or retractions. She is not intubated.      Breath sounds: No stridor, decreased air movement or transmitted upper airway sounds. No decreased breath sounds, wheezing, rhonchi or rales.   Chest:      Chest wall: No tenderness.   Abdominal:      General: Bowel sounds are normal. There is no distension.      Palpations: Abdomen is soft.      Tenderness: There is no abdominal tenderness. There is no right CVA tenderness, left CVA tenderness, guarding or rebound. Negative signs include Hung's sign.   Musculoskeletal:      Right lower leg: No edema.      Left lower leg: No edema.   Neurological:      Mental Status: She is alert and oriented to person, place, and time.   Psychiatric:         Mood and Affect: Mood normal.         Behavior: Behavior is cooperative.       Results:   Lab Results   Component Value Date    WBC 11.8 (H) 03/01/2024    HGB 8.2 (L) 03/01/2024    HCT 26.2 (L) 03/01/2024    .0 03/01/2024    CREATSERUM 1.65 (H) 03/01/2024    BUN 31 (H) 03/01/2024     03/01/2024    K 4.3 03/01/2024     03/01/2024    CO2 20.0 (L) 03/01/2024     (H) 03/01/2024    CA 8.1 (L) 03/01/2024    ALB 3.4 02/24/2024    ALKPHO 63 02/24/2024    BILT 0.2 02/24/2024    TP 6.4 02/24/2024    AST 18 02/24/2024    ALT <7 (L) 02/24/2024    PTT 69.9 (H) 02/28/2024    INR 0.93 12/14/2023    TSH 0.622 10/20/2023    DDIMER 1.60 (H) 02/23/2024    ESRML 91 (H) 09/14/2023    CRP 1.10 (H) 09/14/2023    MG 1.0 (LL) 02/16/2024    TROPHS 9 02/23/2024    B12 748 10/20/2023       No results found.        Assessment & Plan:     PE (pulmonary  thromboembolism) (HCC) from right lower lobe pulmonary artery to multiple segmental arteries on CT 2-23-24.   Without acute cor pulmonale.  Doppler negative for DVT on 2-23-24.   Back on Eliquis.  Monitor hemoglobin.  Not sure of duration whether will be 6 months or longer due to history of breast cancer.      Type 2 diabetes mellitus without retinopathy (HCC)  Sugars are higher.  Off oral hypoglycemics while in hospital.  Sliding scale insulin, hypoglycemic protocol, Accu-Cheks.  Will add a little bit of Levemir.      Hypertension  Generally well-controlled but on the lower limits of normal.  Spironolactone and losartan are being held due to hyperkalemia.  Monitor.      Chronic obstructive pulmonary disease, unspecified (HCC)  Stable.  Nebs as needed.  Pulmonary following patient.      ABLA (acute blood loss anemia)  History of AVMs.  Status post endoscopy.  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 Multiple gastric and duodenal AVMs. She recently underwent cautery of gastric and proximal duodenal avms in two months ago and now multiple new ones have grown. Suspect she will have chronic AVM disease given its natural course. Utility of prophylactic endoscopic treatment is not clear as she will continue to grow new ones. Suggest medical management,.).  GI following patient.  Hemoglobin improved status posttransfusion 1 unit PRBCs.  Monitor back on Eliquis.  On PPI.  Slight drop in hemoglobin noted this morning.  But no evidence of more blood loss.  Has not had any bowel movement.  May be hydration effect?  Will monitor.       Elevated white count.  May be due to GI bleed.  But more elevated than yesterday but overall decreased from initial admission.  Check UA.  Denies any respiratory symptoms.  On steroids but stable dose from prior.  Slight improvement.  Resolved.       Hyperkalemia.  Will hold potassium and spironolactone and losartan.  Low potassium diet.  Will monitor.  Will  give some Kayexalate.  Renal to be consulted.       DARWIN.   Not sure if due to dehydration.  Stop diuretics.  Check bladder scan and renal ultrasound and UA.  Avoid nephrotoxins.  Monitor.  Getting light dose of IV fluids.  Was n.p.o. for EGD.  Plus appetite is diminished.  Check postvoid residual.  Last diet load approximately 6 days ago.  Improving.  Avoid nephrotoxins.  Continue to hold diuretics.  No evidence of fluid overload.       UTI.  On IV antibiotics.  Urine culture positive for Klebsiella aerogenes sensitive to ceftriaxone.       RA.  Stable on prednisone and hydroxychloroquine.  Follows up with rheum as an outpatient.        Hyperlipidemia.  On statin.       Abnormal CT chest.  With bilateral fibrosis and cystic changes.  Pulmonary following patient.        Urinary retention.  Added Flomax.  Straight cath protocol.  Urology consulted.    DVT prophylaxis on Eliquis.  GI prophylaxis on PPI.  CODE STATUS full.  Discussed with patient.  Discussed with daughter Brea Leyva in room.   MAGGIE RN taking care of pt.     **Certification      PHYSICIAN Certification of Need for Inpatient Hospitalization - Initial Certification    Patient will require inpatient services that will reasonably be expected to span two midnight's based on the clinical documentation in H+P.   Based on patients current state of illness, I anticipate that, after discharge, patient will require TBD.    Heike Sorto MD  3/1/2024

## 2024-03-01 NOTE — OCCUPATIONAL THERAPY NOTE
03/01/24 0945   VISIT TYPE   OT Inpatient Visit Type  Attempted Evaluation  OT has been attempted in the past; pt and family declined services. A new order was placed, pt and pt's daughter denied any OT needs at this time and turned down OT service. DC OT.   OT Follow Up   Charge   (Missed visit)   Follow Up Needed  No  DC OT; no needs         Eddie Mishra OTR/L  Occupational Therapy  [facility]

## 2024-03-02 PROBLEM — E83.42 HYPOMAGNESEMIA: Status: ACTIVE | Noted: 2024-03-02

## 2024-03-02 PROBLEM — K59.00 CONSTIPATION: Status: ACTIVE | Noted: 2024-03-02

## 2024-03-02 LAB
ANION GAP SERPL CALC-SCNC: 6 MMOL/L (ref 0–18)
BASOPHILS # BLD AUTO: 0.04 X10(3) UL (ref 0–0.2)
BASOPHILS NFR BLD AUTO: 0.3 %
BUN BLD-MCNC: 30 MG/DL (ref 9–23)
BUN/CREAT SERPL: 21.4 (ref 10–20)
CALCIUM BLD-MCNC: 9.1 MG/DL (ref 8.7–10.4)
CHLORIDE SERPL-SCNC: 108 MMOL/L (ref 98–112)
CO2 SERPL-SCNC: 24 MMOL/L (ref 21–32)
CREAT BLD-MCNC: 1.4 MG/DL
DEPRECATED RDW RBC AUTO: 52.9 FL (ref 35.1–46.3)
EGFRCR SERPLBLD CKD-EPI 2021: 37 ML/MIN/1.73M2 (ref 60–?)
EOSINOPHIL # BLD AUTO: 0.19 X10(3) UL (ref 0–0.7)
EOSINOPHIL NFR BLD AUTO: 1.6 %
ERYTHROCYTE [DISTWIDTH] IN BLOOD BY AUTOMATED COUNT: 16.2 % (ref 11–15)
GLUCOSE BLD-MCNC: 144 MG/DL (ref 70–99)
GLUCOSE BLDC GLUCOMTR-MCNC: 167 MG/DL (ref 70–99)
GLUCOSE BLDC GLUCOMTR-MCNC: 174 MG/DL (ref 70–99)
GLUCOSE BLDC GLUCOMTR-MCNC: 263 MG/DL (ref 70–99)
GLUCOSE BLDC GLUCOMTR-MCNC: 91 MG/DL (ref 70–99)
HCT VFR BLD AUTO: 28.7 %
HGB BLD-MCNC: 9.1 G/DL
IMM GRANULOCYTES # BLD AUTO: 0.1 X10(3) UL (ref 0–1)
IMM GRANULOCYTES NFR BLD: 0.9 %
LYMPHOCYTES # BLD AUTO: 1.32 X10(3) UL (ref 1–4)
LYMPHOCYTES NFR BLD AUTO: 11.4 %
MAGNESIUM SERPL-MCNC: 1 MG/DL (ref 1.6–2.6)
MAGNESIUM SERPL-MCNC: 1.4 MG/DL (ref 1.6–2.6)
MCH RBC QN AUTO: 28.6 PG (ref 26–34)
MCHC RBC AUTO-ENTMCNC: 31.7 G/DL (ref 31–37)
MCV RBC AUTO: 90.3 FL
MONOCYTES # BLD AUTO: 1.01 X10(3) UL (ref 0.1–1)
MONOCYTES NFR BLD AUTO: 8.7 %
NEUTROPHILS # BLD AUTO: 8.96 X10 (3) UL (ref 1.5–7.7)
NEUTROPHILS # BLD AUTO: 8.96 X10(3) UL (ref 1.5–7.7)
NEUTROPHILS NFR BLD AUTO: 77.1 %
OSMOLALITY SERPL CALC.SUM OF ELEC: 295 MOSM/KG (ref 275–295)
PLATELET # BLD AUTO: 310 10(3)UL (ref 150–450)
POTASSIUM SERPL-SCNC: 4.4 MMOL/L (ref 3.5–5.1)
RBC # BLD AUTO: 3.18 X10(6)UL
SODIUM SERPL-SCNC: 138 MMOL/L (ref 136–145)
WBC # BLD AUTO: 11.6 X10(3) UL (ref 4–11)

## 2024-03-02 PROCEDURE — 99232 SBSQ HOSP IP/OBS MODERATE 35: CPT | Performed by: INTERNAL MEDICINE

## 2024-03-02 RX ORDER — MAGNESIUM OXIDE 400 MG/1
800 TABLET ORAL ONCE
Status: COMPLETED | OUTPATIENT
Start: 2024-03-02 | End: 2024-03-02

## 2024-03-02 NOTE — PLAN OF CARE

## 2024-03-02 NOTE — PROGRESS NOTES
[facility]  part of [facility]     Progress Note    Chester Bautista Patient Status:  Inpatient    1940 MRN [id number]   Location [facility]5W Attending Heike Sorto MD   Hosp Day # 8 PCP Heike Sorto MD       Subjective:   Patient seen and examined.  Resting in bed.  Denies significant dyspnea    Objective:   Blood pressure 128/67, pulse 103, temperature 98.8 °F (37.1 °C), temperature source Oral, resp. rate 18, height 5' 1\" (1.549 m), weight 129 lb 6.4 oz (58.7 kg), SpO2 99%.  Intake/Output:   Last 3 shifts: I/O last 3 completed shifts:  In: 928 [P.O.:898; I.V.:30]  Out: 3075 [Urine:3075]   This shift: No intake/output data recorded.     Vent Settings:      Hemodynamic parameters (last 24 hours):      Scheduled Meds:         Continuous Infusions:     Physical Exam  Constitutional: no acute distress  Eyes: PERRL  ENT: nares pateint  Neck: supple, no JVD  Cardio: RRR, S1 S2  Respiratory: clear to auscultation bilaterally, no wheezing, rales, rhonchi, crackles  GI: abdomen soft, non tender, active bowel sounds, no organomegaly  Extremities: no clubbing, cyanosis, edema  Neurologic: no gross motor deficits  Skin: warm, dry      Results:     Lab Results   Component Value Date    WBC 11.6 2024    HGB 9.1 2024    HCT 28.7 2024    .0 2024    CREATSERUM 1.40 2024    BUN 30 2024     2024    K 4.4 2024     2024    CO2 24.0 2024     2024    CA 9.1 2024    MG 1.0 2024       XR ABDOMEN (1 VIEW) (CPT=74018)    Result Date: 3/1/2024  CONCLUSION:  1. Moderate-large amount of stool predominantly in the left colon consistent constipation/fecal retention 2. Nonspecific nonobstructive abdominal gas pattern. 3. Vascular calcifications noted.    Dictated by (CST): Louis Moreno MD on 3/01/2024 at 2:10 PM     Finalized by (CST): Louis Moreno MD on 3/01/2024 at 2:14 PM                  Assessment   1.  Acute pulmonary embolism  2.  GI bleed, resolved  2.  Bronchiectasis  4.  UTI     Plan   -Presented with evidence of acute pulmonary embolism.  -Underlying history of GI bleeding with AV malformation.  -Hemoglobin stable at this time.  -Transitioned to anticoagulation with Xarelto from pulmonary perspective  -Okay for discharge from pulmonary standpoint.  Will sign off    Damon Williamson, DO  Pulmonary Critical Care Medicine  [facility]

## 2024-03-02 NOTE — PLAN OF CARE
A&Ox4. Pt ambulates standby with walker, monitoring blood glucose levels per order- ACHS, voiding up to bathroom- straight cath per protocol, pt had multiple bowel movements, on room air. Frequent rounding by nursing staff. Safety precautions maintained/call light within reach.    Problem: Patient Centered Care  Goal: Patient preferences are identified and integrated in the patient's plan of care  Description: Interventions:  - What would you like us to know as we care for you? From home with family  - Provide timely, complete, and accurate information to patient/family  - Incorporate patient and family knowledge, values, beliefs, and cultural backgrounds into the planning and delivery of care  - Encourage patient/family to participate in care and decision-making at the level they choose  - Honor patient and family perspectives and choices  Outcome: Progressing     Problem: CARDIOVASCULAR - ADULT  Goal: Maintains optimal cardiac output and hemodynamic stability  Description: INTERVENTIONS:  - Monitor vital signs, rhythm, and trends  - Monitor for bleeding, hypotension and signs of decreased cardiac output  - Evaluate effectiveness of vasoactive medications to optimize hemodynamic stability  - Monitor arterial and/or venous puncture sites for bleeding and/or hematoma  - Assess quality of pulses, skin color and temperature  - Assess for signs of decreased coronary artery perfusion - ex. Angina  - Evaluate fluid balance, assess for edema, trend weights  Outcome: Progressing     Problem: RESPIRATORY - ADULT  Goal: Achieves optimal ventilation and oxygenation  Description: INTERVENTIONS:  - Assess for changes in respiratory status  - Assess for changes in mentation and behavior  - Position to facilitate oxygenation and minimize respiratory effort  - Oxygen supplementation based on oxygen saturation or ABGs  - Provide Smoking Cessation handout, if applicable  - Encourage broncho-pulmonary hygiene including cough, deep  breathe, Incentive Spirometry  - Assess the need for suctioning and perform as needed  - Assess and instruct to report SOB or any respiratory difficulty  - Respiratory Therapy support as indicated  - Manage/alleviate anxiety  - Monitor for signs/symptoms of CO2 retention  Outcome: Progressing     Problem: PAIN - ADULT  Goal: Verbalizes/displays adequate comfort level or patient's stated pain goal  Description: INTERVENTIONS:  - Encourage pt to monitor pain and request assistance  - Assess pain using appropriate pain scale  - Administer analgesics based on type and severity of pain and evaluate response  - Implement non-pharmacological measures as appropriate and evaluate response  - Consider cultural and social influences on pain and pain management  - Manage/alleviate anxiety  - Utilize distraction and/or relaxation techniques  - Monitor for opioid side effects  - Notify MD/LIP if interventions unsuccessful or patient reports new pain  - Anticipate increased pain with activity and pre-medicate as appropriate  Outcome: Progressing     Problem: RISK FOR INFECTION - ADULT  Goal: Absence of fever/infection during anticipated neutropenic period  Description: INTERVENTIONS  - Monitor WBC  - Administer growth factors as ordered  - Implement neutropenic guidelines  Outcome: Progressing     Problem: SAFETY ADULT - FALL  Goal: Free from fall injury  Description: INTERVENTIONS:  - Assess pt frequently for physical needs  - Identify cognitive and physical deficits and behaviors that affect risk of falls.  - [place] fall precautions as indicated by assessment.  - Educate pt/family on patient safety including physical limitations  - Instruct pt to call for assistance with activity based on assessment  - Modify environment to reduce risk of injury  - Provide assistive devices as appropriate  - Consider OT/PT consult to assist with strengthening/mobility  - Encourage toileting schedule  Outcome: Progressing

## 2024-03-02 NOTE — PROGRESS NOTES
[facility]  part of [facility]    Progress Note    Chester Bautista Patient Status:  Inpatient    1940 MRN [id number]   Location [facility]5W Attending Heike Sorto MD   Hosp Day # 8 PCP Heike Sorto MD     Chief Complaint:     Subjective:   Subjective:  I saw patient today morning.  In the morning she informed me that she only had a small bowel movements.  Later, she had multiple bowel movements.  After that, she felt dizzy.  Her magnesium level was low.  She or family needs to learn self cath before discharge.  Other option is for replacement.  Objective:   Blood pressure 123/58, pulse 93, temperature 98.5 °F (36.9 °C), temperature source Oral, resp. rate 20, height 5' 1\" (1.549 m), weight 129 lb 6.4 oz (58.7 kg), SpO2 96%.  Physical Exam  Constitutional:       General: She is not in acute distress.  HENT:      Head: Atraumatic.   Eyes:      General: No scleral icterus.  Cardiovascular:      Rate and Rhythm: Normal rate and regular rhythm.      Heart sounds: Normal heart sounds.   Pulmonary:      Effort: Pulmonary effort is normal. No respiratory distress.      Breath sounds: Normal breath sounds. No wheezing.   Abdominal:      General: Bowel sounds are normal.      Palpations: Abdomen is soft.      Tenderness: There is no abdominal tenderness.   Musculoskeletal:      Cervical back: Neck supple.      Right lower leg: No edema.      Left lower leg: No edema.   Neurological:      General: No focal deficit present.      Mental Status: She is alert and oriented to person, place, and time.   Psychiatric:         Behavior: Behavior normal.         Thought Content: Thought content normal.         Results:   Lab Results   Component Value Date    WBC 11.6 (H) 2024    HGB 9.1 (L) 2024    HCT 28.7 (L) 2024    .0 2024    CREATSERUM 1.40 (H) 2024    BUN 30 (H) 2024     2024    K 4.4 2024     2024     CO2 24.0 03/02/2024     (H) 03/02/2024    CA 9.1 03/02/2024    ALB 3.4 02/24/2024    ALKPHO 63 02/24/2024    BILT 0.2 02/24/2024    TP 6.4 02/24/2024    AST 18 02/24/2024    ALT <7 (L) 02/24/2024    PTT 69.9 (H) 02/28/2024    INR 0.93 12/14/2023    TSH 0.622 10/20/2023    DDIMER 1.60 (H) 02/23/2024    ESRML 91 (H) 09/14/2023    CRP 1.10 (H) 09/14/2023    MG 1.0 (LL) 03/02/2024    TROPHS 9 02/23/2024    B12 748 10/20/2023       XR ABDOMEN (1 VIEW) (CPT=74018)    Result Date: 3/1/2024  CONCLUSION:  1. Moderate-large amount of stool predominantly in the left colon consistent constipation/fecal retention 2. Nonspecific nonobstructive abdominal gas pattern. 3. Vascular calcifications noted.    Dictated by (CST): Louis Moreno MD on 3/01/2024 at 2:10 PM     Finalized by (CST): Louis Moreno MD on 3/01/2024 at 2:14 PM               Assessment & Plan:   Acute pulmonary embolism-appreciate pulmonary input.  Plan for Eliquis    Constipation-resolved now.  Had multiple bowel movements today.    Dizziness-chronic-she did not feel good after bowel movements.  This is most likely from vasovagal.  We will keep her 1 more day.    Urine retention-plan for self cath.  Patient/daughter needs to learn this before discharge.  Otherwise needs to go home with Alonso     Diabetes-monitor blood sugars    Hypertension monitor blood pressures    Hypomagnesemia-replaced.    COPD-stable appreciate pulmonary input.  Cleared for discharge.    Acute blood loss anemia-slight drop in hemoglobin again today.  History of AVM.    Rheumatoid arthritis-stable    Dyslipidemia-continue statins    Acute cystitis currently on antibiotics    DVT prophylaxis Eliquis    Plan  I do not think she can go home today.  Her magnesium has been replaced.  Needs recheck magnesium level.  Patient/family needs to learn self cath.  Otherwise insert Alonso and follow-up with urology for voiding trial in 7 to 10 days.  Will transition to p.o.  antibiotics.  Need to monitor CBC in 3 days postdischarge.  Hopefully home tomorrow    Naren Ordaz MD  3/2/2024

## 2024-03-03 ENCOUNTER — APPOINTMENT (OUTPATIENT)
Dept: CT IMAGING | Facility: HOSPITAL | Age: 84
End: 2024-03-03
Attending: INTERNAL MEDICINE
Payer: MEDICARE

## 2024-03-03 LAB
GLUCOSE BLDC GLUCOMTR-MCNC: 140 MG/DL (ref 70–99)
GLUCOSE BLDC GLUCOMTR-MCNC: 233 MG/DL (ref 70–99)
GLUCOSE BLDC GLUCOMTR-MCNC: 266 MG/DL (ref 70–99)
GLUCOSE BLDC GLUCOMTR-MCNC: 89 MG/DL (ref 70–99)
MAGNESIUM SERPL-MCNC: 1.4 MG/DL (ref 1.6–2.6)

## 2024-03-03 PROCEDURE — 70450 CT HEAD/BRAIN W/O DYE: CPT | Performed by: INTERNAL MEDICINE

## 2024-03-03 PROCEDURE — 99232 SBSQ HOSP IP/OBS MODERATE 35: CPT | Performed by: INTERNAL MEDICINE

## 2024-03-03 RX ORDER — MAGNESIUM OXIDE 400 MG/1
800 TABLET ORAL ONCE
Status: COMPLETED | OUTPATIENT
Start: 2024-03-03 | End: 2024-03-03

## 2024-03-03 NOTE — PLAN OF CARE
A&Ox4. Pt ambulates standby with walker, monitoring blood glucose levels per order- ACHS, straight cath per protocol, tolerating diet, on room air. CT of head done today- no acute findings. Continued to teach CIC. Frequent rounding by nursing staff. Safety precautions maintained/call light within reach. Plan for discharge tomorrow pending medical clearance.    Problem: Patient Centered Care  Goal: Patient preferences are identified and integrated in the patient's plan of care  Description: Interventions:  - What would you like us to know as we care for you? From home with family  - Provide timely, complete, and accurate information to patient/family  - Incorporate patient and family knowledge, values, beliefs, and cultural backgrounds into the planning and delivery of care  - Encourage patient/family to participate in care and decision-making at the level they choose  - Honor patient and family perspectives and choices  Outcome: Progressing     Problem: CARDIOVASCULAR - ADULT  Goal: Maintains optimal cardiac output and hemodynamic stability  Description: INTERVENTIONS:  - Monitor vital signs, rhythm, and trends  - Monitor for bleeding, hypotension and signs of decreased cardiac output  - Evaluate effectiveness of vasoactive medications to optimize hemodynamic stability  - Monitor arterial and/or venous puncture sites for bleeding and/or hematoma  - Assess quality of pulses, skin color and temperature  - Assess for signs of decreased coronary artery perfusion - ex. Angina  - Evaluate fluid balance, assess for edema, trend weights  Outcome: Progressing     Problem: RESPIRATORY - ADULT  Goal: Achieves optimal ventilation and oxygenation  Description: INTERVENTIONS:  - Assess for changes in respiratory status  - Assess for changes in mentation and behavior  - Position to facilitate oxygenation and minimize respiratory effort  - Oxygen supplementation based on oxygen saturation or ABGs  - Provide Smoking Cessation  handout, if applicable  - Encourage broncho-pulmonary hygiene including cough, deep breathe, Incentive Spirometry  - Assess the need for suctioning and perform as needed  - Assess and instruct to report SOB or any respiratory difficulty  - Respiratory Therapy support as indicated  - Manage/alleviate anxiety  - Monitor for signs/symptoms of CO2 retention  Outcome: Progressing     Problem: PAIN - ADULT  Goal: Verbalizes/displays adequate comfort level or patient's stated pain goal  Description: INTERVENTIONS:  - Encourage pt to monitor pain and request assistance  - Assess pain using appropriate pain scale  - Administer analgesics based on type and severity of pain and evaluate response  - Implement non-pharmacological measures as appropriate and evaluate response  - Consider cultural and social influences on pain and pain management  - Manage/alleviate anxiety  - Utilize distraction and/or relaxation techniques  - Monitor for opioid side effects  - Notify MD/LIP if interventions unsuccessful or patient reports new pain  - Anticipate increased pain with activity and pre-medicate as appropriate  Outcome: Progressing     Problem: RISK FOR INFECTION - ADULT  Goal: Absence of fever/infection during anticipated neutropenic period  Description: INTERVENTIONS  - Monitor WBC  - Administer growth factors as ordered  - Implement neutropenic guidelines  Outcome: Progressing     Problem: SAFETY ADULT - FALL  Goal: Free from fall injury  Description: INTERVENTIONS:  - Assess pt frequently for physical needs  - Identify cognitive and physical deficits and behaviors that affect risk of falls.  - [place] fall precautions as indicated by assessment.  - Educate pt/family on patient safety including physical limitations  - Instruct pt to call for assistance with activity based on assessment  - Modify environment to reduce risk of injury  - Provide assistive devices as appropriate  - Consider OT/PT consult to assist with  strengthening/mobility  - Encourage toileting schedule  Outcome: Progressing     Problem: DISCHARGE PLANNING  Goal: Discharge to home or other facility with appropriate resources  Description: INTERVENTIONS:  - Identify barriers to discharge w/pt and caregiver  - Include patient/family/discharge partner in discharge planning  - Arrange for needed discharge resources and transportation as appropriate  - Identify discharge learning needs (meds, wound care, etc)  - Arrange for interpreters to assist at discharge as needed  - Consider post-discharge preferences of patient/family/discharge partner  - Complete POLST form as appropriate  - Assess patient's ability to be responsible for managing their own health  - Refer to Case Management Department for coordinating discharge planning if the patient needs post-hospital services based on physician/LIP order or complex needs related to functional status, cognitive ability or social support system  Outcome: Progressing

## 2024-03-03 NOTE — PLAN OF CARE

## 2024-03-03 NOTE — PROGRESS NOTES
[facility]  part of [facility]    Progress Note    Chester Bautista Patient Status:  Inpatient    1940 MRN [id number]   Location [facility]5W Attending Heike Sorto MD   Hosp Day # 9 PCP Heike Sorto MD     Chief Complaint:     Subjective:   Subjective:  She reports head heaviness and dizziness.   No chest pain or shortness of breath.  Planning to learn self cath.  Objective:   Blood pressure 107/63, pulse 100, temperature 98.4 °F (36.9 °C), temperature source Oral, resp. rate 20, height 5' 1\" (1.549 m), weight 129 lb 6.4 oz (58.7 kg), SpO2 96%.  Physical Exam  Constitutional:       General: She is not in acute distress.  HENT:      Head: Atraumatic.   Cardiovascular:      Rate and Rhythm: Normal rate and regular rhythm.      Heart sounds: Normal heart sounds.   Pulmonary:      Effort: Pulmonary effort is normal. No respiratory distress.      Breath sounds: Normal breath sounds. No wheezing.   Abdominal:      General: Bowel sounds are normal.      Palpations: Abdomen is soft.      Tenderness: There is no abdominal tenderness.   Musculoskeletal:      Cervical back: Neck supple.      Right lower leg: No edema.      Left lower leg: No edema.   Neurological:      General: No focal deficit present.      Mental Status: She is alert and oriented to person, place, and time.      Cranial Nerves: No cranial nerve deficit.   Psychiatric:         Behavior: Behavior normal.         Thought Content: Thought content normal.         Results:   Lab Results   Component Value Date    WBC 11.6 (H) 2024    HGB 9.1 (L) 2024    HCT 28.7 (L) 2024    .0 2024    CREATSERUM 1.40 (H) 2024    BUN 30 (H) 2024     2024    K 4.4 2024     2024    CO2 24.0 2024     (H) 2024    CA 9.1 2024    ALB 3.4 2024    ALKPHO 63 2024    BILT 0.2 2024    TP 6.4 2024    AST 18 2024     ALT <7 (L) 02/24/2024    PTT 69.9 (H) 02/28/2024    INR 0.93 12/14/2023    TSH 0.622 10/20/2023    DDIMER 1.60 (H) 02/23/2024    ESRML 91 (H) 09/14/2023    CRP 1.10 (H) 09/14/2023    MG 1.4 (L) 03/03/2024    TROPHS 9 02/23/2024    B12 748 10/20/2023       XR ABDOMEN (1 VIEW) (CPT=74018)    Result Date: 3/1/2024  CONCLUSION:  1. Moderate-large amount of stool predominantly in the left colon consistent constipation/fecal retention 2. Nonspecific nonobstructive abdominal gas pattern. 3. Vascular calcifications noted.    Dictated by (CST): Louis Moreno MD on 3/01/2024 at 2:10 PM     Finalized by (CST): Louis Moreno MD on 3/01/2024 at 2:14 PM               Assessment & Plan:   Acute pulmonary embolism-appreciate pulmonary input.  Plan for Eliquis    Constipation-resolved     Headache and dizziness-history of aneurysm-will do CT head without contrast    Urine retention-plan for self cath.  Patient/daughter needs to learn this before discharge.  Otherwise needs to go home with Alonso     Diabetes-monitor blood sugars    Hypertension monitor blood pressures    Hypomagnesemia-replaced.    COPD-stable appreciate pulmonary input.  Cleared for discharge.    Acute blood loss anemia-hemoglobin relatively stable. History of AVM.    Rheumatoid arthritis-stable    Dyslipidemia-continue statins    Acute cystitis currently on antibiotics    DVT prophylaxis Eliquis    Plan  She is not comfortable to go home today.  She does not feel good with her head discomfort and dizziness.  Will do CT head to reassure.  She is learning how to self cath.  Home tomorrow  If she cannot do self cath, she needs to go home with a Alonso with voiding trial in 7 to 10 days.    Naren Ordaz MD  3/3/2024

## 2024-03-04 ENCOUNTER — MED REC SCAN ONLY (OUTPATIENT)
Facility: CLINIC | Age: 84
End: 2024-03-04

## 2024-03-04 PROBLEM — R33.9 URINARY RETENTION: Status: ACTIVE | Noted: 2024-03-04

## 2024-03-04 LAB
ANION GAP SERPL CALC-SCNC: 6 MMOL/L (ref 0–18)
BUN BLD-MCNC: 33 MG/DL (ref 9–23)
BUN/CREAT SERPL: 20.2 (ref 10–20)
CALCIUM BLD-MCNC: 9.5 MG/DL (ref 8.7–10.4)
CHLORIDE SERPL-SCNC: 106 MMOL/L (ref 98–112)
CO2 SERPL-SCNC: 23 MMOL/L (ref 21–32)
CREAT BLD-MCNC: 1.63 MG/DL
DEPRECATED RDW RBC AUTO: 52.3 FL (ref 35.1–46.3)
EGFRCR SERPLBLD CKD-EPI 2021: 31 ML/MIN/1.73M2 (ref 60–?)
ERYTHROCYTE [DISTWIDTH] IN BLOOD BY AUTOMATED COUNT: 16.2 % (ref 11–15)
GLUCOSE BLD-MCNC: 178 MG/DL (ref 70–99)
GLUCOSE BLDC GLUCOMTR-MCNC: 201 MG/DL (ref 70–99)
GLUCOSE BLDC GLUCOMTR-MCNC: 210 MG/DL (ref 70–99)
GLUCOSE BLDC GLUCOMTR-MCNC: 241 MG/DL (ref 70–99)
GLUCOSE BLDC GLUCOMTR-MCNC: 241 MG/DL (ref 70–99)
HCT VFR BLD AUTO: 26.5 %
HGB BLD-MCNC: 8.6 G/DL
MAGNESIUM SERPL-MCNC: 1.4 MG/DL (ref 1.6–2.6)
MAGNESIUM SERPL-MCNC: 1.4 MG/DL (ref 1.6–2.6)
MCH RBC QN AUTO: 29.3 PG (ref 26–34)
MCHC RBC AUTO-ENTMCNC: 32.5 G/DL (ref 31–37)
MCV RBC AUTO: 90.1 FL
OSMOLALITY SERPL CALC.SUM OF ELEC: 292 MOSM/KG (ref 275–295)
PLATELET # BLD AUTO: 249 10(3)UL (ref 150–450)
POTASSIUM SERPL-SCNC: 4.7 MMOL/L (ref 3.5–5.1)
RBC # BLD AUTO: 2.94 X10(6)UL
SODIUM SERPL-SCNC: 135 MMOL/L (ref 136–145)
WBC # BLD AUTO: 11.1 X10(3) UL (ref 4–11)

## 2024-03-04 PROCEDURE — 99232 SBSQ HOSP IP/OBS MODERATE 35: CPT | Performed by: PHYSICIAN ASSISTANT

## 2024-03-04 PROCEDURE — 99233 SBSQ HOSP IP/OBS HIGH 50: CPT | Performed by: INTERNAL MEDICINE

## 2024-03-04 RX ORDER — PANTOPRAZOLE SODIUM 40 MG/1
40 TABLET, DELAYED RELEASE ORAL
Status: DISCONTINUED | OUTPATIENT
Start: 2024-03-04 | End: 2024-03-04

## 2024-03-04 RX ORDER — VANCOMYCIN HYDROCHLORIDE 125 MG/1
125 CAPSULE ORAL DAILY
Status: DISCONTINUED | OUTPATIENT
Start: 2024-03-04 | End: 2024-03-13

## 2024-03-04 RX ORDER — TORSEMIDE 20 MG/1
20 TABLET ORAL DAILY
Qty: 90 TABLET | Refills: 1 | Status: SHIPPED | OUTPATIENT
Start: 2024-03-04 | End: 2024-03-11

## 2024-03-04 RX ORDER — MAGNESIUM OXIDE 400 MG/1
800 TABLET ORAL ONCE
Status: COMPLETED | OUTPATIENT
Start: 2024-03-04 | End: 2024-03-04

## 2024-03-04 RX ORDER — PANTOPRAZOLE SODIUM 40 MG/1
40 TABLET, DELAYED RELEASE ORAL
Status: DISCONTINUED | OUTPATIENT
Start: 2024-03-04 | End: 2024-03-13

## 2024-03-04 RX ORDER — TAMSULOSIN HYDROCHLORIDE 0.4 MG/1
0.4 CAPSULE ORAL DAILY
Qty: 30 CAPSULE | Refills: 0 | Status: SHIPPED | OUTPATIENT
Start: 2024-03-05 | End: 2024-04-04

## 2024-03-04 NOTE — PLAN OF CARE
Patient alert, oriented, vitals stable. Denies pain/discomfort. Tolerated scheduled medications. Discharge planning, pending auth for SANDOSTATIN LAR 20 MG IM MONTHLY. Daughter at bedside, nursing update given. Gavin insterted per urology orders, pt. education provided, to discharge home with benson.      Problem: Patient Centered Care  Goal: Patient preferences are identified and integrated in the patient's plan of care  Description: Interventions:  - What would you like us to know as we care for you? From home with family  - Provide timely, complete, and accurate information to patient/family  - Incorporate patient and family knowledge, values, beliefs, and cultural backgrounds into the planning and delivery of care  - Encourage patient/family to participate in care and decision-making at the level they choose  - Honor patient and family perspectives and choices  Outcome: Progressing     Problem: CARDIOVASCULAR - ADULT  Goal: Maintains optimal cardiac output and hemodynamic stability  Description: INTERVENTIONS:  - Monitor vital signs, rhythm, and trends  - Monitor for bleeding, hypotension and signs of decreased cardiac output  - Evaluate effectiveness of vasoactive medications to optimize hemodynamic stability  - Monitor arterial and/or venous puncture sites for bleeding and/or hematoma  - Assess quality of pulses, skin color and temperature  - Assess for signs of decreased coronary artery perfusion - ex. Angina  - Evaluate fluid balance, assess for edema, trend weights  Outcome: Progressing     Problem: RESPIRATORY - ADULT  Goal: Achieves optimal ventilation and oxygenation  Description: INTERVENTIONS:  - Assess for changes in respiratory status  - Assess for changes in mentation and behavior  - Position to facilitate oxygenation and minimize respiratory effort  - Oxygen supplementation based on oxygen saturation or ABGs  - Provide Smoking Cessation handout, if applicable  - Encourage broncho-pulmonary hygiene  including cough, deep breathe, Incentive Spirometry  - Assess the need for suctioning and perform as needed  - Assess and instruct to report SOB or any respiratory difficulty  - Respiratory Therapy support as indicated  - Manage/alleviate anxiety  - Monitor for signs/symptoms of CO2 retention  Outcome: Progressing     Problem: PAIN - ADULT  Goal: Verbalizes/displays adequate comfort level or patient's stated pain goal  Description: INTERVENTIONS:  - Encourage pt to monitor pain and request assistance  - Assess pain using appropriate pain scale  - Administer analgesics based on type and severity of pain and evaluate response  - Implement non-pharmacological measures as appropriate and evaluate response  - Consider cultural and social influences on pain and pain management  - Manage/alleviate anxiety  - Utilize distraction and/or relaxation techniques  - Monitor for opioid side effects  - Notify MD/LIP if interventions unsuccessful or patient reports new pain  - Anticipate increased pain with activity and pre-medicate as appropriate  Outcome: Progressing     Problem: RISK FOR INFECTION - ADULT  Goal: Absence of fever/infection during anticipated neutropenic period  Description: INTERVENTIONS  - Monitor WBC  - Administer growth factors as ordered  - Implement neutropenic guidelines  Outcome: Progressing     Problem: SAFETY ADULT - FALL  Goal: Free from fall injury  Description: INTERVENTIONS:  - Assess pt frequently for physical needs  - Identify cognitive and physical deficits and behaviors that affect risk of falls.  - [place] fall precautions as indicated by assessment.  - Educate pt/family on patient safety including physical limitations  - Instruct pt to call for assistance with activity based on assessment  - Modify environment to reduce risk of injury  - Provide assistive devices as appropriate  - Consider OT/PT consult to assist with strengthening/mobility  - Encourage toileting schedule  Outcome:  Progressing     Problem: Patient/Family Goals  Goal: Patient/Family Long Term Goal  Description: Patient's Long Term Goal: to go home    Interventions:  - - Monitor vitals  - Monitor appropriate labs  - Pain management  - Follow MD order  - Diagnostics per order  - Update/Informing patient and family on plan of care  - Discharge planning  - See additional Care Plan goals for specific interventions  Outcome: Progressing  Goal: Patient/Family Short Term Goal  Description: Patient's Short Term Goal: to feel better    Interventions:   - - Monitor vitals  - Monitor appropriate labs  - Pain management  - Follow MD order  - Diagnostics per order  - Update/Informing patient and family on plan of care  - Discharge planning  - See additional Care Plan goals for specific interventions  Outcome: Progressing     Problem: DISCHARGE PLANNING  Goal: Discharge to home or other facility with appropriate resources  Description: INTERVENTIONS:  - Identify barriers to discharge w/pt and caregiver  - Include patient/family/discharge partner in discharge planning  - Arrange for needed discharge resources and transportation as appropriate  - Identify discharge learning needs (meds, wound care, etc)  - Arrange for interpreters to assist at discharge as needed  - Consider post-discharge preferences of patient/family/discharge partner  - Complete POLST form as appropriate  - Assess patient's ability to be responsible for managing their own health  - Refer to Case Management Department for coordinating discharge planning if the patient needs post-hospital services based on physician/LIP order or complex needs related to functional status, cognitive ability or social support system  Outcome: Progressing

## 2024-03-04 NOTE — PROGRESS NOTES
[facility]  part of [facility]    Progress Note    Chester Bautista Patient Status:  Inpatient    1940 MRN [id number]   Location [facility]5W Attending Heike Sorto MD   Hosp Day # 10 PCP Heike Sorto MD     Chief Complaint: GIB.     Subjective:     Constitutional:  Positive for activity change. Negative for appetite change, chills, diaphoresis, fatigue, fever and unexpected weight change.   HENT:  Negative for trouble swallowing.    Respiratory:  Negative for apnea, cough, choking, chest tightness, shortness of breath, wheezing and stridor.    Cardiovascular:  Negative for chest pain, palpitations and leg swelling.   Gastrointestinal:  Negative for heartburn, nausea, vomiting, abdominal pain, diarrhea, constipation (Last BM> 48 hrs. Not eatting much due to NPO.), blood in stool, abdominal distention, anal bleeding and rectal pain.   Endocrine: Negative for cold intolerance, heat intolerance, polydipsia, polyphagia and polyuria.   Genitourinary:  Negative for bladder incontinence, dysuria, urgency, frequency, hematuria, flank pain and difficulty urinating.   Musculoskeletal:  Positive for neck pain. Myalgias: Radiates up when lays head down on bed. Pillow at home is different. Had Ct done because was worse..  Neurological:  Negative for dizziness, tremors, seizures, syncope, facial asymmetry, speech difficulty, weakness, light-headedness, numbness and headaches.   Psychiatric/Behavioral:  Negative for suicidal ideas, hallucinations, behavioral problems, confusion, sleep disturbance, self-injury, decreased concentration, agitation and depressed mood. The patient is not nervous/anxious and is not hyperactive.    All other systems reviewed and are negative.      Objective:   Blood pressure 117/62, pulse 98, temperature 98.8 °F (37.1 °C), temperature source Oral, resp. rate 16, height 5' 1\" (1.549 m), weight 129 lb 6.4 oz (58.7 kg), SpO2 94%.  Physical  Exam  Vitals and nursing note reviewed.   Constitutional:       General: She is not in acute distress.     Appearance: Normal appearance. She is well-groomed. She is not ill-appearing, toxic-appearing or diaphoretic.      Interventions: She is not intubated.  Eyes:      General: No scleral icterus.  Cardiovascular:      Rate and Rhythm: Normal rate and regular rhythm.      Pulses: Normal pulses.   Pulmonary:      Effort: No tachypnea, bradypnea, accessory muscle usage, prolonged expiration, respiratory distress or retractions. She is not intubated.      Breath sounds: No stridor, decreased air movement or transmitted upper airway sounds. No decreased breath sounds, wheezing, rhonchi or rales.   Chest:      Chest wall: No tenderness.   Abdominal:      General: Bowel sounds are normal. There is no distension.      Palpations: Abdomen is soft.      Tenderness: There is no abdominal tenderness. There is no right CVA tenderness, left CVA tenderness, guarding or rebound. Negative signs include Hung's sign.   Musculoskeletal:      Right lower leg: No edema.      Left lower leg: No edema.   Neurological:      Mental Status: She is alert and oriented to person, place, and time.   Psychiatric:         Mood and Affect: Mood normal.         Behavior: Behavior is cooperative.         Results:   Lab Results   Component Value Date    WBC 11.1 (H) 03/04/2024    HGB 8.6 (L) 03/04/2024    HCT 26.5 (L) 03/04/2024    .0 03/04/2024    CREATSERUM 1.63 (H) 03/04/2024    BUN 33 (H) 03/04/2024     (L) 03/04/2024    K 4.7 03/04/2024     03/04/2024    CO2 23.0 03/04/2024     (H) 03/04/2024    CA 9.5 03/04/2024    ALB 3.4 02/24/2024    ALKPHO 63 02/24/2024    BILT 0.2 02/24/2024    TP 6.4 02/24/2024    AST 18 02/24/2024    ALT <7 (L) 02/24/2024    PTT 69.9 (H) 02/28/2024    INR 0.93 12/14/2023    TSH 0.622 10/20/2023    DDIMER 1.60 (H) 02/23/2024    ESRML 91 (H) 09/14/2023    CRP 1.10 (H) 09/14/2023    MG 1.4 (L)  03/04/2024    TROPHS 9 02/23/2024    B12 748 10/20/2023       CT BRAIN OR HEAD (82572)    Result Date: 3/3/2024  CONCLUSION:  1. No acute intracranial process. 2. Stable postoperative changes from a remote frontal craniotomy and anterior parafalcine aneurysm clipping. 3. Stable mild chronic microangiopathic ischemic changes with large vessel calcific atherosclerosis.    Dictated by (CST): Abraham Mar MD on 3/03/2024 at 1:51 PM     Finalized by (CST): Abraham Mar MD on 3/03/2024 at 1:53 PM               Assessment & Plan:     PE (pulmonary thromboembolism) (HCC) from right lower lobe pulmonary artery to multiple segmental arteries on CT 2-23-24.   Without acute cor pulmonale.  Doppler negative for DVT on 2-23-24.   Back on Eliquis.  Monitor hemoglobin.  Not sure of duration whether will be 6 months or longer due to history of breast cancer.      Type 2 diabetes mellitus without retinopathy (HCC)  Sugars are higher.  Off oral hypoglycemics while in hospital.  Sliding scale insulin, hypoglycemic protocol, Accu-Cheks.  Will add a little bit of Levemir.      Hypertension  Generally well-controlled but on the lower limits of normal.  Spironolactone and losartan are being held due to hyperkalemia.  Monitor.      Chronic obstructive pulmonary disease, unspecified (HCC)  Stable.  Nebs as needed.  Pulmonary following patient.      ABLA (acute blood loss anemia)  History of AVMs.  Status post endoscopy.  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 Multiple gastric and duodenal AVMs. She recently underwent cautery of gastric and proximal duodenal avms in two months ago and now multiple new ones have grown. Suspect she will have chronic AVM disease given its natural course. Utility of prophylactic endoscopic treatment is not clear as she will continue to grow new ones. Suggest medical management,.).  GI following patient.  Hemoglobin improved status posttransfusion 1 unit PRBCs.   Monitor back on Eliquis.  On PPI.  Slight drop in hemoglobin noted this morning.  But no evidence of more blood loss.  Has not had any bowel movement.  May be hydration effect?  Will monitor.  Slight drop but last time this happened 1 recheck was normal.  Not sure if some of that lab?       Elevated white count.  May be due to GI bleed.  But more elevated than yesterday but overall decreased from initial admission.  Check UA.  Denies any respiratory symptoms.  On steroids but stable dose from prior.  Slight improvement.         Hyperkalemia.  Will hold potassium and spironolactone and losartan.  Low potassium diet.  Will monitor.  Will give some Kayexalate.  Renal to be consulted.  Resolved.       DARWIN.   Not sure if due to dehydration.  Stop diuretics.  Check bladder scan and renal ultrasound and UA.  Avoid nephrotoxins.  Monitor.  Getting light dose of IV fluids.  Was n.p.o. for EGD.  Plus appetite is diminished.  Check postvoid residual.  Last diet load approximately 6 days ago.  Improving.  Avoid nephrotoxins.  Continue to hold diuretics.  No evidence of fluid overload.       UTI.  On IV antibiotics.  Urine culture positive for Klebsiella aerogenes sensitive to ceftriaxone.  Will finish dose of ceftriaxone today and will be the last day of antibiotics.       RA.  Stable on prednisone and hydroxychloroquine.  Follows up with rheum as an outpatient.        Hyperlipidemia.  On statin.       Abnormal CT chest.  With bilateral fibrosis and cystic changes.  Pulmonary following patient.        Urinary retention.  Added Flomax.  Straight cath protocol.  Urology consulted.  Patient unwilling to do straight cath and daughter does not want to do.  Explained to patient.  Alonso to be inserted.  And follow-up with urology in 1 week.        Hypomagnesemia.  Supplement and monitor.    DVT prophylaxis on Eliquis.  GI prophylaxis on PPI.  CODE STATUS full.  Discussed with patient.  MAGGIE RN taking care of pt.  Was ready to discharge  patient patient patient had not done stairs with physical therapy yet.  Patient has approximately 13 stairs in the home.  But can stay on the first floor.  Lives with daughter.  Also concerned with getting.  Tried through insurance.  Patient is met medically cleared pending authorization from GI for octreotide.    **Certification      PHYSICIAN Certification of Need for Inpatient Hospitalization - Initial Certification    Patient will require inpatient services that will reasonably be expected to span two midnight's based on the clinical documentation in H+P.   Based on patients current state of illness, I anticipate that, after discharge, patient will require TBD.    Heike Sorto MD  3/4/2024

## 2024-03-04 NOTE — PROGRESS NOTES
[facility]    CDI Prediction Tool Protocol (Vancomycin Initiated)    OVP (oral vancomycin prophylaxis) 125 mg PO Daily is being started in this patient based on a score of 16.      Score Breakdown:  High risk antibiotic use (5 points)  Hospital length of stay > 7 days (3 points)  Malignancy (3 points)     Age >/= 80 years (3 points)  PPI use in hospital (1 point)  Recently hospitalized: within 90 days (1 point)    This patient is currently at high risk for developing CDI due to his/her score being >/=13 points and is being started on prophylactic oral vancomycin to prevent Cdiff.     This patient does not have C. difficile infection. All measures taken within this protocol are to decrease the risk of CDI development.    Shonna Shah, PharmD  3/4/2024  7:10 AM  [place]  Pharmacy Extension: [phone]

## 2024-03-04 NOTE — PHYSICAL THERAPY NOTE
PHYSICAL THERAPY TREATMENT NOTE - INPATIENT     Room Number: 521/521-A       Presenting Problem: patient admitted from home w/ SOB, found to have russell acute PE (Doppler for BLE negative for DVT, started on IV heparin)  Co-Morbidities : RA, breast cancer s/p lumpectomy 2016, COPD on RA, DM    Problem List  Principal Problem:    Other pulmonary embolism without acute cor pulmonale (HCC)  Active Problems:    Type 2 diabetes mellitus without retinopathy (HCC)    Anemia, iron deficiency    Dyslipidemia    Hypertension    Infiltrating ductal carcinoma of right breast, stage 1 (HCC)    Rheumatoid arthritis (HCC)    AVM (arteriovenous malformation) of duodenum, acquired    Chronic obstructive pulmonary disease, unspecified (HCC)    VTE (venous thromboembolism)    ABLA (acute blood loss anemia)    Hyperkalemia    DARWIN (acute kidney injury) (HCC)    Hypomagnesemia    Constipation    Urinary retention      PHYSICAL THERAPY ASSESSMENT   Patient demonstrates good  progress this session, goals  remain in progress.    Patient continues to function below baseline with bed mobility, transfers, gait, stair negotiation, and standing prolonged periods.  Contributing factors to remaining limitations include decreased functional strength, decreased endurance/aerobic capacity, decreased muscular endurance, and dizziness .  Next session anticipate patient to progress bed mobility, transfers, gait, and stair negotiation.  Physical Therapy will continue to follow patient for duration of hospitalization.    Patient continues to benefit from continued skilled PT services: at discharge to promote functional independence and safety with additional support and return home with home health PT.    PLAN  PT Treatment Plan: Bed mobility;Body mechanics;Energy conservation;Endurance;Patient education;Family education;Gait training;Transfer training;Balance training;Strengthening  Frequency (Obs): 3-5x/week    SUBJECTIVE  \"I just get so  tired\"    OBJECTIVE  Precautions: Bed/chair alarm    PAIN ASSESSMENT   Ratin    BALANCE  Static Sitting: Good  Dynamic Sitting: Fair +  Static Standing: Fair  Dynamic Standing: Fair -    ACTIVITY TOLERANCE  Pulse: 84  Heart Rate Source: Monitor     BP: 108/61 (101/90 mmHg with activity)  BP Location: Left arm  BP Method: Automatic  Patient Position: Sitting     O2 WALK  Oxygen Therapy  SPO2% on Room Air at Rest: 95    AM-PAC '6-Clicks' INPATIENT SHORT FORM - BASIC MOBILITY  How much difficulty does the patient currently have...  Patient Difficulty: Turning over in bed (including adjusting bedclothes, sheets and blankets)?: A Little   Patient Difficulty: Sitting down on and standing up from a chair with arms (e.g., wheelchair, bedside commode, etc.): A Little   Patient Difficulty: Moving from lying on back to sitting on the side of the bed?: A Little   How much help from another person does the patient currently need...   Help from Another: Moving to and from a bed to a chair (including a wheelchair)?: A Little   Help from Another: Need to walk in hospital room?: A Little   Help from Another: Climbing 3-5 steps with a railing?: A Little     AM-PAC Score:  Raw Score: 18   Approx Degree of Impairment: 46.58%   Standardized Score (AM-PAC Scale): 43.63   CMS Modifier (G-Code): CK    FUNCTIONAL ABILITY STATUS  Functional Mobility/Gait Assessment  Gait Assistance: Contact guard assist (Stand by assistance)  Distance (ft): 25 ft and 50 ft (chair follow)  Assistive Device: Rolling walker  Pattern: Shuffle (decreased lela speed, decreased step length)  Stairs: Other (comment) (NT)  Supine to Sit: stand-by assist  Sit to Stand: contact guard assist    Additional information: Stair negotiation trial deferred this session due to increased fatigue and patient reporting dizziness with activity. Patient benefits from increased time and lengthy rest breaks in order to complete functional mobility tasks. Patient with two  ambulation trials with chair follow - patient requiring to be wheeled back into room, reporting unable to ambulate further. Patient reports that at home, she has 14 stairs to negotiate to get to her bedroom, but that she oftentimes has to take a seated rest break on the stairs ever 2-3 stairs. Reported that she can sleep in a recliner on the main level upon initially returning home if needed.     The patient's Approx Degree of Impairment: 46.58% has been calculated based on documentation in the [facility] '6 clicks' Inpatient Daily Activity Short Form.  Research supports that patients with this level of impairment may benefit from HHPT.  Final disposition will be made by interdisciplinary medical team.    Patient in bed upon arrival. RN approved activity. Educated patient on POC and benefits of mobilization. Agreeable to participate. Patient reporting no pain.  Patient End of Session: Up in chair;Needs met;Call light within reach;RN aware of session/findings;All patient questions and concerns addressed;Alarm set    CURRENT GOALS   Goals to be met by: 3.5.24  Patient Goal Patient's self-stated goal is: Home w/ dtr   Goal #1 Patient is able to demonstrate supine - sit EOB @ level: independent      Goal #1   Current Status  SBA   Goal #2 Patient is able to demonstrate transfers Sit to/from Stand at assistance level: supervision with walker - rolling      Goal #2  Current Status  CGA with RW   Goal #3 Patient is able to ambulate 50 feet with assist device: walker - rolling at assistance level: supervision   Goal #3   Current Status  CGA>SBA 25 ft and 50 ft with RW and chair follow   Goal #4 Patient will negotiate 14 stairs/one curb w/ assistive device and supervision   Goal #4   Current Status  NT   Goal #5 Patient to demonstrate independence with home activity/exercise instructions provided to patient in preparation for discharge.   Goal #5   Current Status  Ongoing     Gait Training: 15 minutes  Therapeutic Activity: 15  minutes

## 2024-03-04 NOTE — PROGRESS NOTES
[facility]  part of [facility]  Consult Follow-Up Note    Chester Bautista Patient Status:  Inpatient    1940 MRN [id number]   Location [facility]5W Attending Heike Sorto MD   Hosp Day # 10 PCP Heike Sorto MD     Subjective:   VSS  No acute issues overnight.   Per nursing patient is unable to urinate on her own, they have had to complete CIC.   The patient reports constipation is improving.   The patient is reluctant to benson placement.     Objective:   Vital Signs:  Blood pressure 117/62, pulse 98, temperature 98.8 °F (37.1 °C), temperature source Oral, resp. rate 16, height 5' 1\" (1.549 m), weight 129 lb 6.4 oz (58.7 kg), SpO2 94%.    General: No acute distress. Alert and oriented x 3.  HEENT: Moist mucous membranes.  Respiratory: Comfortable breathing.  Neurologic: No acute focal neurological deficits.  Musculoskeletal: Full range of motion of all extremities.  No swelling noted.  Psychiatric: Appropriate mood and affect.    Results:     Recent Labs   Lab 24  0634 24  1302 24  1821 24  0602 24  0605   WBC 11.8*  --   --  11.6* 11.1*   HGB 8.2*   < > 9.3* 9.1* 8.6*   HCT 26.2*   < > 29.5* 28.7* 26.5*   .0  --   --  310.0 249.0    < > = values in this interval not displayed.     Recent Labs   Lab 24  0634 24  0602 24  0605    138 135*   K 4.3 4.4 4.7    108 106   CO2 20.0* 24.0 23.0   BUN 31* 30* 33*   CREATSERUM 1.65* 1.40* 1.63*   * 144* 178*   CA 8.1* 9.1 9.5     Hospital Encounter on 24   1. Urine Culture, Routine     Status: Abnormal    Collection Time: 24  7:59 AM    Specimen: Urine, clean catch   Result Value Ref Range    Urine Culture (A) N/A     >100,000 CFU/ML Klebsiella (Enterobacter) aerogenes       Susceptibility    Klebsiella (Enterobacter) aerogenes -  (no method available)     Cefazolin >=64 Resistant      Cefepime <=1 Sensitive       Ceftriaxone <=1 Sensitive      Ciprofloxacin <=0.25 Sensitive      Gentamicin <=1 Sensitive      Meropenem <=0.25 Sensitive      Levofloxacin <=0.12 Sensitive      Nitrofurantoin 128 Resistant      Piperacillin + Tazobactam 8 Sensitive      Trimethoprim/Sulfa <=20 Sensitive       CT BRAIN OR HEAD (18067)    Result Date: 3/3/2024  CONCLUSION:  1. No acute intracranial process. 2. Stable postoperative changes from a remote frontal craniotomy and anterior parafalcine aneurysm clipping. 3. Stable mild chronic microangiopathic ischemic changes with large vessel calcific atherosclerosis.    Dictated by (CST): Abraham Mar MD on 3/03/2024 at 1:51 PM     Finalized by (CST): Abraham Mar MD on 3/03/2024 at 1:53 PM         Assessment and Plan:   84 year old female  found to have pulmonary emboli.  During admission found to also have a urinary tract infection for Klebsiella, currently being treated with ceftriaxone IV.  The patient also has constipation.  A x-ray abdomen from March 1, 2024 shows moderate to large amount of stool predominantly in the left colon consistent with constipation/fecal retention.  The patient developed urinary retention.  She is able to urinate on her own per nursing on morning 3/1/2024 she did put out 750 mL.  Has been started on Flomax.     Patient now however is unable to urinate on her own per nursing. The patient's urine output per chart shows it has been 400-500 mL of urine. Nursing has advised that patient is struggling to grasp CIC, she is unable to complete. I discussed placing a benson with patient. The patient is reluctant. We discussed the risks of urinary retention, discussed that this could lead to worsening kidney function. SrCr today is 1.63. The patient advised that she understands the risk. She would like to go home, be in her own environment and try to urinate and complete CIC there. She reports her niece who is an employee here can assist her with CIC. She is declining  placement of a benson cath.       Recommendations:  1) maximize bowel regimen as constipation can contribute to urinary retention.  Avoid nephrotoxic medications.   2) If unable to teach CIC then place a Benson catheter with plans for voiding trial in 7 to 10 days. If patient is really reluctant to Benson, perhaps consult social work to ensure she has support at home for CIC. Plan for urology follow up 7-10 days after hospital discharge.   3) treat UTI, ABX per primary.      Diego Betancourt PA-C  3/4/2024

## 2024-03-04 NOTE — CM/SW NOTE
RACHAEL spoke to [facility] pharmacy regarding Eliquis coverage.    RACHAEL called [facility] pharmacy in [place] [phone] and spoke to tech.  Per tech, prior auth is needed.    RxBIN: [id number]  Group:   PCN:[id number]    Per cover my meds portal, no prior auth needed.    RACHAEL called back [facility] and spoke to Fany who re-entered script.  Prescription approved.    Co-pay $141 for 3-month supply.    Trial offer placed in hard chart- unsure if it will work due to quantity prescribed.    Heike Guerrier MSW, LSW [id number]

## 2024-03-04 NOTE — DIETARY NOTE
ADULT NUTRITION REASSESSMENT    Pt is at moderate nutrition risk.  Pt does not meet malnutrition criteria.      RECOMMENDATIONS TO MD: See Nutrition Intervention    ADMITTING DIAGNOSIS:  Other pulmonary embolism without acute cor pulmonale, unspecified chronicity (HCC) [I26.99]  PERTINENT PAST MEDICAL HISTORY:   Past Medical History:   Diagnosis Date    Anxiety state     Cancer (HCC)     breast cancer 2018    Chronic obstructive pulmonary disease, unspecified (HCC) 05/04/2023    Chronic obstructive pulmonary disease, unspecified (HCC) 05/04/2023    COPD (chronic obstructive pulmonary disease) (HCC)     Diabetes (HCC)     Essential hypertension     Exposure to medical diagnostic radiation     High blood pressure     High cholesterol     History of blood transfusion 07/2022    low hemoglobin    Osteoarthritis     PONV (postoperative nausea and vomiting)     Pulmonary embolism (HCC)     Pulmonary emphysema (HCC)     Rheumatoid arthritis (HCC)      PATIENT STATUS: Initial 02/27/24: Pt admit for pulmonary embolism with acute cor pulmonale. PMH sig for COPD, T2DM, Breast Cancer s/p lumpectomy and radiation (2016) and others noted above. Pt assessed due to consult received for loss of appetite and weight loss. Pt screened at no nutrition risk at admission by RN. Chart reviewed, pt previously admitted for hypokalemia and pneumonia vs pulmonary edema chest x-ray about 2 weeks ago. Since discharge from previous admission, pt noted to have shortness of breath with exertion. Pt noted with acute pulmonary embolism. Pt NPO this morning for EGD with push enteroscopy - moderate amount of solid food in stomach obscuring visualization, procedure aborted with plan to retry tomorrow. Pt currently on clear liquid diet (CLD). Pt visited, no family at bedside. Pt reports relatively good intake. Typically eats 2 meals a day. Pt reports used to drink 2 Ensures daily but has not in awhile as eating better and would rely on them as meals  instead of cooking. Pt reports weight loss over last week despite no changes in appetite or intake. Usual body weight (UBW) about 142#. Current weight 132# 12.8 oz (standing scale). EMR weight review, last known weight prior to admission (PTA) 143# 3 oz on 2/16/24 - 10.4# or 7.2% weight loss x 11 days (significant). Per EMR weight review, pt noted weighing 148# on 11/24/23 - 15.2 or 10.3% weight loss x 3 months (significant) and 143# 13 oz on 8/14/23 - 11# or 7.6% weight loss x 6 months (non-significant). Nutrition focused physical exam (NFPE) completed, see below for details. Encouraged adequate energy and protein intake when solid diet resumed. Pt reports she does laps around her house for exercise. Discussed adding oral nutritional supplement (ONS) to help maximize nutrition, pt in agreement and provided preferences. +ONS per discussion     Update 03/04/24: RA completed per protocol. Chart reviewed, diet advanced off CLD s/p EGD. Pt currently on Carb controlled diet;1.5 gram Na, low Potassium diet. Repeat EGD completed 2/28 with multiple gastric and duodenal AVMs. Discussion with RN, eating well - not drinking ONS (pt dislikes). Pt receiving potassium pills daily. Reviewed diet, liberalized Cardiac and Low Potassium Restriction. Pt with variable intake since last visit - averaging 70% overall (good). Pt visited, feels pretty good, nervous about receiving benson as does not want long term. Pt reports not liking flavor of Ensure Clear but open to Vanilla Ensure Compact daily. Adjusted ONS per discussion.     FOOD/NUTRITION RELATED HISTORY:  Appetite: Varies   Intake: ~5-100% x8 meals documented since last visit - averaging 70% overall (good). Pt not drinking ONS  Intake Meeting Needs: Marginal, monitor need for oral nutrition supplements (ONS), Adjusteed ONS (Dislikes Ensure Clear, eating relatively well)  Percent Meals Eaten (last 6 days)       Date/Time Percent Meals Eaten (%)    02/28/24 3184 75 %    02/29/24  1105 100 %    02/29/24 1511 30 %    02/29/24 1741 --     Percent Meals Eaten (%): dinner at bedside at 02/29/24 1741    03/01/24 0900 100 %     Percent Meals Eaten (%): corn flakes at 03/01/24 0900    03/02/24 2114 100 %    03/03/24 0932 5 %    03/03/24 1432 100 %     Percent Meals Eaten (%): pt's family brought food from home; at 03/03/24 1432    03/04/24 0917 50 %          Food Allergies: No Known Food Allergies (NKFA)  Cultural/Ethnic/Advent Preferences: Not Obtained    GASTROINTESTINAL: +BM 3/2/2024 - small;soft and constipation  U/O 1920 ml (1.4 ml/kg/hr) - adequate    MEDICATIONS: reviewed Oral Torsemide daily  Electrolyte replacement per protocol (cardiac)   vancomycin  125 mg Oral Daily    pantoprazole  40 mg Oral BID AC    octreoTIDe  100 mcg Subcutaneous Q8H    insulin degludec  8 Units Subcutaneous Nightly    tamsulosin  0.4 mg Oral Daily    apixaban  10 mg Oral BID    Followed by    [START ON 3/6/2024] apixaban  5 mg Oral BID    cefTRIAXone  1 g Intravenous Q24H    insulin aspart  1-7 Units Subcutaneous TID CC    cyclobenzaprine  10 mg Oral Nightly    carvedilol  25 mg Oral BID with meals    fluticasone furoate-vilanterol  1 puff Inhalation Daily    glycopyrrolate  1 mg Oral TID    hydroxychloroquine  200 mg Oral Daily    cetirizine  5 mg Oral Daily    montelukast  10 mg Oral Nightly    NIFEdipine ER  60 mg Oral Daily    potassium chloride  20 mEq Oral BID    predniSONE  5 mg Oral Daily    rosuvastatin  10 mg Oral Nightly    torsemide  20 mg Oral Daily    [Held by provider] losartan  50 mg Oral Daily     LABS: reviewed A1c 9.5% on 2/2/24   POC Glucose reviewed  Hyponatremia (135) and Hypomagnesemia (1.4) noted  Recent Labs     03/01/24  0634 03/02/24  0602 03/02/24  0602 03/02/24  1756 03/03/24  0534 03/04/24  0605   * 144*  --   --   --  178*   BUN 31* 30*  --   --   --  33*   CREATSERUM 1.65* 1.40*  --   --   --  1.63*   CA 8.1* 9.1  --   --   --  9.5   MG  --  1.0*   < > 1.4* 1.4* 1.4*   NA  137 138  --   --   --  135*   K 4.3 4.4  --   --   --  4.7    108  --   --   --  106   CO2 20.0* 24.0  --   --   --  23.0   OSMOCALC 294 295  --   --   --  292    < > = values in this interval not displayed.     NUTRITION RELATED PHYSICAL FINDINGS:  - Nutrition Focused Physical Exam (NFPE): no wasting noted   - Fluid Accumulation: non-pitting Right Lower extremity and Foot  see RN documentation for details  - Skin Integrity: intact see RN documentation for details    ANTHROPOMETRICS:  HT: 154.9 cm (5' 1\")  WT: 58.7 kg (129 lb 6.4 oz) - standing scale, no new weight since 2/29/24  Admit Wt: 137# on 2/23/24  BMI: Body mass index is 24.45 kg/m².  BMI CLASSIFICATION: 25-29.9 kg/m2 - overweight  IBW: 105 lbs        126% IBW  Usual Body Wt: 142 lbs per pt      94% UBW    WEIGHT HISTORY:  Patient Weight(s) for the past 336 hrs:   Weight   02/29/24 0819 58.7 kg (129 lb 6.4 oz)   02/27/24 1242 60.2 kg (132 lb 12.8 oz)   02/27/24 0650 60.2 kg (132 lb 12.8 oz)   02/26/24 0510 58.9 kg (129 lb 12.8 oz)   02/23/24 1936 61.7 kg (136 lb)     Wt Readings from Last 10 Encounters:   02/29/24 58.7 kg (129 lb 6.4 oz)   02/23/24 62.1 kg (137 lb)   02/16/24 65 kg (143 lb 3.2 oz)   02/15/24 64.4 kg (142 lb)   02/04/24 64.1 kg (141 lb 4.8 oz)   02/02/24 65.9 kg (145 lb 3.2 oz)   12/21/23 66.7 kg (147 lb)   12/14/23 66.7 kg (147 lb)   12/07/23 66.9 kg (147 lb 8 oz)   12/01/23 66.7 kg (147 lb)     NUTRITION DIAGNOSIS/PROBLEM:   Unintended wt loss related to Decreased ability to consume sufficient energy as evidenced by significant weight loss noted over last  few weeks    NUTRITION DIAGNOSIS PROGRESS:  Improvement (unresolved)    NUTRITION INTERVENTION:   NUTRITION PRESCRIPTION:   Estimated Nutrition needs: --dosing wt of 60.2 kg - wt taken on 2/27/2024  Calories: 0458-7744 calories/day (25-30 calories per kg Dosing wt)  Protein: 60-72 g protein/day (1.0-1.2 g protein/kg Dosing wt)    - Diet:       Procedures    Regular/General diet  Calorie Restriction/Carb Controlled: 1800 kcal/60 grams; Sodium Restriction: 1.5 GM NA; Texture Consistency: Regular; Is Patient on Accuchecks? Yes; Is Patient on Suicide Precautions? No; Misc Restriction: Cardiac, Low Potassi...    Carbohydrate controlled diet      - RD Care Plan: Encouraged increased PO intake and Initiated ONS (oral nutritional supplements)  - Meals and snacks: Encouraged increased PO intake  - Medical Food Supplements-RD added Ensure Compact (220 calories/ 9 g protein each) Daily Vanilla. Rational/use of oral supplements discussed.  - Vitamin and mineral supplements: none  - Feeding assistance: meal set up  - Nutrition education: Discussed importance of adequate energy and protein intake     - Coordination of nutrition care: collaboration with other providers  - Discharge and transfer of nutrition care to new setting or provider: monitor plans Plan Home with HH    MONITOR AND EVALUATE/NUTRITION GOALS:  - Food and Nutrient Intake:      Monitor: adequacy of PO intake and adequacy of supplement intake  - Food and Nutrient Administration:      Monitor: N/A  - Anthropometric Measurement:    Monitor weight  - Nutrition Goals:      maintain wt within 5%, PO and supplement greater than 75% of needs, good supplement intake, labs within acceptable limits, minimize lean body mass loss, prevent skin breakdown, and support body systems    DIETITIAN FOLLOW UP: RD to follow and monitor nutrition status    Rosio Robison MS, GUANACO, RDN, LDN  [id number]

## 2024-03-04 NOTE — PLAN OF CARE

## 2024-03-05 PROBLEM — K31.811 AVM (ARTERIOVENOUS MALFORMATION) OF DUODENUM, ACQUIRED WITH HEMORRHAGE: Status: ACTIVE | Noted: 2024-03-05

## 2024-03-05 PROBLEM — R42 ORTHOSTATIC DIZZINESS: Status: ACTIVE | Noted: 2024-03-05

## 2024-03-05 LAB
ANION GAP SERPL CALC-SCNC: 6 MMOL/L (ref 0–18)
BASOPHILS # BLD AUTO: 0.05 X10(3) UL (ref 0–0.2)
BASOPHILS NFR BLD AUTO: 0.4 %
BUN BLD-MCNC: 33 MG/DL (ref 9–23)
BUN/CREAT SERPL: 25.4 (ref 10–20)
CALCIUM BLD-MCNC: 9 MG/DL (ref 8.7–10.4)
CHLORIDE SERPL-SCNC: 107 MMOL/L (ref 98–112)
CO2 SERPL-SCNC: 24 MMOL/L (ref 21–32)
CREAT BLD-MCNC: 1.3 MG/DL
DEPRECATED RDW RBC AUTO: 52.8 FL (ref 35.1–46.3)
EGFRCR SERPLBLD CKD-EPI 2021: 40 ML/MIN/1.73M2 (ref 60–?)
EOSINOPHIL # BLD AUTO: 0.22 X10(3) UL (ref 0–0.7)
EOSINOPHIL NFR BLD AUTO: 1.7 %
ERYTHROCYTE [DISTWIDTH] IN BLOOD BY AUTOMATED COUNT: 16.3 % (ref 11–15)
GLUCOSE BLD-MCNC: 173 MG/DL (ref 70–99)
GLUCOSE BLDC GLUCOMTR-MCNC: 207 MG/DL (ref 70–99)
GLUCOSE BLDC GLUCOMTR-MCNC: 221 MG/DL (ref 70–99)
GLUCOSE BLDC GLUCOMTR-MCNC: 224 MG/DL (ref 70–99)
HCT VFR BLD AUTO: 25.2 %
HCT VFR BLD AUTO: 26.2 %
HGB BLD-MCNC: 8.1 G/DL
HGB BLD-MCNC: 8.2 G/DL
IMM GRANULOCYTES # BLD AUTO: 0.09 X10(3) UL (ref 0–1)
IMM GRANULOCYTES NFR BLD: 0.7 %
LYMPHOCYTES # BLD AUTO: 1.72 X10(3) UL (ref 1–4)
LYMPHOCYTES NFR BLD AUTO: 13.2 %
MAGNESIUM SERPL-MCNC: 1.5 MG/DL (ref 1.6–2.6)
MCH RBC QN AUTO: 29.2 PG (ref 26–34)
MCHC RBC AUTO-ENTMCNC: 32.1 G/DL (ref 31–37)
MCV RBC AUTO: 91 FL
MONOCYTES # BLD AUTO: 1.03 X10(3) UL (ref 0.1–1)
MONOCYTES NFR BLD AUTO: 7.9 %
NEUTROPHILS # BLD AUTO: 9.96 X10 (3) UL (ref 1.5–7.7)
NEUTROPHILS # BLD AUTO: 9.96 X10(3) UL (ref 1.5–7.7)
NEUTROPHILS NFR BLD AUTO: 76.1 %
OSMOLALITY SERPL CALC.SUM OF ELEC: 295 MOSM/KG (ref 275–295)
PLATELET # BLD AUTO: 275 10(3)UL (ref 150–450)
POTASSIUM SERPL-SCNC: 4.5 MMOL/L (ref 3.5–5.1)
RBC # BLD AUTO: 2.77 X10(6)UL
SODIUM SERPL-SCNC: 137 MMOL/L (ref 136–145)
WBC # BLD AUTO: 13.1 X10(3) UL (ref 4–11)

## 2024-03-05 PROCEDURE — 99233 SBSQ HOSP IP/OBS HIGH 50: CPT | Performed by: INTERNAL MEDICINE

## 2024-03-05 RX ORDER — INSULIN DEGLUDEC 100 U/ML
12 INJECTION, SOLUTION SUBCUTANEOUS NIGHTLY
Status: DISCONTINUED | OUTPATIENT
Start: 2024-03-05 | End: 2024-03-07

## 2024-03-05 RX ORDER — MIDODRINE HYDROCHLORIDE 2.5 MG/1
2.5 TABLET ORAL 3 TIMES DAILY
Status: DISCONTINUED | OUTPATIENT
Start: 2024-03-05 | End: 2024-03-13

## 2024-03-05 RX ORDER — NIFEDIPINE 30 MG/1
30 TABLET, EXTENDED RELEASE ORAL DAILY
Status: DISCONTINUED | OUTPATIENT
Start: 2024-03-06 | End: 2024-03-07

## 2024-03-05 RX ORDER — SODIUM CHLORIDE/ALOE VERA
GEL (GRAM) NASAL AS NEEDED
Status: DISCONTINUED | OUTPATIENT
Start: 2024-03-05 | End: 2024-03-13

## 2024-03-05 RX ORDER — MAGNESIUM OXIDE 400 MG/1
800 TABLET ORAL ONCE
Status: COMPLETED | OUTPATIENT
Start: 2024-03-05 | End: 2024-03-05

## 2024-03-05 RX ORDER — SODIUM CHLORIDE 9 MG/ML
INJECTION, SOLUTION INTRAVENOUS ONCE
Status: COMPLETED | OUTPATIENT
Start: 2024-03-05 | End: 2024-03-05

## 2024-03-05 RX ORDER — POLYETHYLENE GLYCOL 3350 17 G/17G
17 POWDER, FOR SOLUTION ORAL DAILY
Status: DISCONTINUED | OUTPATIENT
Start: 2024-03-05 | End: 2024-03-06

## 2024-03-05 NOTE — PROGRESS NOTES
[facility]     Gastroenterology Progress Note    Chseter Bautista Patient Status:  Inpatient    1940 MRN [id number]   Location [facility]5W Attending Heike Sorto MD   Hosp Day # 11 PCP Heike Sorto MD       Subjective:   Re-consulted for hgb decline. She has not had a bowel movement since colonoscopy prep last week. No abdominal pain, n/v. Did feel lightheaded this morning.     Objective:   Blood pressure 104/53, pulse 83, temperature 98.3 °F (36.8 °C), temperature source Oral, resp. rate 18, height 5' 1\" (1.549 m), weight 129 lb 6.4 oz (58.7 kg), SpO2 100%. Body mass index is 24.45 kg/m².    General: awake, alert and oriented, no acute distress  HEENT: moist mucus membranes  PULM: no conversational dyspnea  CARDIOVASCULAR: regular rate and rhythm, the extremities are warm and well perfused  GI: soft, non-tender, non-distended, + BS, no rebound/guarding   EXTREMITIES: no edema, moving all extremities  SKIN: no visible rash  NEURO: appropriate and interactive    Assessment and Plan:   Chester Bautista is a a(n) 84 year old female w/ a history of breast cancer s/p lumpectomy and radiation, rheumatoid arthritis, COPD, HTN, HLD, DM, who presented with shortness of breath found to have new PE started on XARELTO this admission. GI consulted for acute on chronic anemia.    EGD with multiple gastric avm's s/p APC and multiple duodenal avms left undisturbed. Resumed on XARELTO 2-28, hgb 8.1 this morning. Started on octreotide this admission given natural disease state of AVMs, anticipate chronic slow oozing jeimy in setting of XARELTO. Started insurance approval process for SANDOSTATIN LAR 20 mg IM monthly. No changes in plans from my stance at this time, transfuse if hgb < 7.        Zoila Mcqueen MD  [facility] Gastroenterology      Results:     Lab Results   Component Value Date    WBC 13.1 (H) 2024    HGB 8.1 (L) 2024    HCT 25.2 (L) 2024     .0 03/05/2024    CREATSERUM 1.30 (H) 03/05/2024    BUN 33 (H) 03/05/2024     03/05/2024    K 4.5 03/05/2024     03/05/2024    CO2 24.0 03/05/2024     (H) 03/05/2024    CA 9.0 03/05/2024    ALB 3.4 02/24/2024    ALKPHO 63 02/24/2024    BILT 0.2 02/24/2024    TP 6.4 02/24/2024    AST 18 02/24/2024    ALT <7 (L) 02/24/2024    PTT 69.9 (H) 02/28/2024    INR 0.93 12/14/2023    TSH 0.622 10/20/2023    DDIMER 1.60 (H) 02/23/2024    ESRML 91 (H) 09/14/2023    CRP 1.10 (H) 09/14/2023    MG 1.5 (L) 03/05/2024    B12 748 10/20/2023       No results found.

## 2024-03-05 NOTE — CM/SW NOTE
SW followed up on discharge planning.    Alie from pharmacy not able to give pt one-time dose of SANDOSTATIN LAR 20 MG IM MONTHLY due to cost.  GI is not willing to let pt discharge without medication approved.    Prior auth expedited on cover my meds portal.    Cover my meds key: [id number]     Eliquis coupon in pt's chart/co-pay in   3/4 note.    PLAN: DC home w/[facility] pending prior auth approval    / to remain available for support and/or discharge planning.     Heike Guerrier MSW, LSW [id number]

## 2024-03-05 NOTE — PROGRESS NOTES
[facility]  part of [facility]    Progress Note    Chester Bautista Patient Status:  Inpatient    1940 MRN [id number]   Location [facility]5W Attending Heike Sorto MD   Hosp Day # 11 PCP Heike Sorto MD     Chief Complaint: GIB.     Subjective:     Constitutional:  Positive for activity change and fatigue. Negative for appetite change, chills, diaphoresis, fever and unexpected weight change.   HENT:  Positive for nosebleeds. Negative for trouble swallowing. Mouth sores: This AM..   Respiratory:  Negative for apnea, cough, choking, chest tightness, shortness of breath, wheezing and stridor.    Cardiovascular:  Negative for chest pain, palpitations and leg swelling.   Gastrointestinal:  Positive for constipation (Last BM> 48 hrs. Not eatting much due to NPO.). Negative for heartburn, nausea, vomiting, abdominal pain, diarrhea, blood in stool, abdominal distention, anal bleeding and rectal pain.   Endocrine: Negative for cold intolerance, heat intolerance, polydipsia, polyphagia and polyuria.   Genitourinary:  Negative for bladder incontinence, dysuria, urgency, frequency, hematuria, flank pain and difficulty urinating.   Musculoskeletal:  Positive for neck pain. Myalgias: Radiates up when lays head down on bed. Pillow at home is different. Had Ct done because was worse..  Neurological:  Positive for dizziness, weakness and light-headedness. Negative for tremors, seizures, syncope, facial asymmetry, speech difficulty, numbness and headaches.        Per patient she usually gets lightheaded at home.  She is careful from laying to sitting she waits a couple minutes.  With physical therapy she got more lightheaded than normal.  Her blood pressure did drop according to physical therapy note.  Unfortunately they did not notify anyone of this effect.   Psychiatric/Behavioral:  Negative for suicidal ideas, hallucinations, behavioral problems, confusion, sleep  disturbance, self-injury, decreased concentration, agitation and depressed mood. The patient is not nervous/anxious and is not hyperactive.    All other systems reviewed and are negative.      Objective:   Blood pressure 116/51, pulse 96, temperature 99 °F (37.2 °C), temperature source Oral, resp. rate 18, height 5' 1\" (1.549 m), weight 129 lb 6.4 oz (58.7 kg), SpO2 96%.  Physical Exam  Vitals and nursing note reviewed.   Constitutional:       General: She is not in acute distress.     Appearance: Normal appearance. She is well-groomed. She is not ill-appearing, toxic-appearing or diaphoretic.      Interventions: She is not intubated.  Eyes:      General: No scleral icterus.  Cardiovascular:      Rate and Rhythm: Normal rate and regular rhythm.      Pulses: Normal pulses.   Pulmonary:      Effort: No tachypnea, bradypnea, accessory muscle usage, prolonged expiration, respiratory distress or retractions. She is not intubated.      Breath sounds: No stridor, decreased air movement or transmitted upper airway sounds. No decreased breath sounds, wheezing, rhonchi or rales.   Chest:      Chest wall: No tenderness.   Abdominal:      General: Bowel sounds are normal. There is no distension.      Palpations: Abdomen is soft.      Tenderness: There is no abdominal tenderness. There is no right CVA tenderness, left CVA tenderness, guarding or rebound. Negative signs include Hung's sign.   Musculoskeletal:      Right lower leg: No edema.      Left lower leg: No edema.   Neurological:      Mental Status: She is alert and oriented to person, place, and time.   Psychiatric:         Mood and Affect: Mood normal.         Behavior: Behavior is cooperative.         Results:   Lab Results   Component Value Date    WBC 13.1 (H) 03/05/2024    HGB 8.1 (L) 03/05/2024    HCT 25.2 (L) 03/05/2024    .0 03/05/2024    CREATSERUM 1.30 (H) 03/05/2024    BUN 33 (H) 03/05/2024     03/05/2024    K 4.5 03/05/2024     03/05/2024     CO2 24.0 03/05/2024     (H) 03/05/2024    CA 9.0 03/05/2024    ALB 3.4 02/24/2024    ALKPHO 63 02/24/2024    BILT 0.2 02/24/2024    TP 6.4 02/24/2024    AST 18 02/24/2024    ALT <7 (L) 02/24/2024    PTT 69.9 (H) 02/28/2024    INR 0.93 12/14/2023    TSH 0.622 10/20/2023    DDIMER 1.60 (H) 02/23/2024    ESRML 91 (H) 09/14/2023    CRP 1.10 (H) 09/14/2023    MG 1.5 (L) 03/05/2024    TROPHS 9 02/23/2024    B12 748 10/20/2023       CT BRAIN OR HEAD ([id number])    Result Date: 3/3/2024  CONCLUSION:  1. No acute intracranial process. 2. Stable postoperative changes from a remote frontal craniotomy and anterior parafalcine aneurysm clipping. 3. Stable mild chronic microangiopathic ischemic changes with large vessel calcific atherosclerosis.    Dictated by (CST): Abraham Mar MD on 3/03/2024 at 1:51 PM     Finalized by (CST): Abraham Mar MD on 3/03/2024 at 1:53 PM               Assessment & Plan:     PE (pulmonary thromboembolism) (HCC) from right lower lobe pulmonary artery to multiple segmental arteries on CT 2-23-24.   Without acute cor pulmonale.  Doppler negative for DVT on 2-23-24.   Back on Eliquis.  Monitor hemoglobin.  Not sure of duration whether will be 6 months or longer due to history of breast cancer.      Type 2 diabetes mellitus without retinopathy (HCC)  Sugars are higher.  Off oral hypoglycemics while in hospital.  Sliding scale insulin, hypoglycemic protocol, Accu-Cheks.  Will add a little bit of Levemir.      Hypertension  Generally well-controlled but on the lower limits of normal.  Spironolactone and losartan are being held due to hyperkalemia.  Monitor.      Chronic obstructive pulmonary disease, unspecified (HCC)  Stable.  Nebs as needed.  Pulmonary following patient.      ABLA (acute blood loss anemia)  History of AVMs.  Status post endoscopy.  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 Multiple gastric and duodenal AVMs. She recently underwent  cautery of gastric and proximal duodenal avms in two months ago and now multiple new ones have grown. Suspect she will have chronic AVM disease given its natural course. Utility of prophylactic endoscopic treatment is not clear as she will continue to grow new ones. Suggest medical management,.).  GI following patient.  Hemoglobin improved status posttransfusion 1 unit PRBCs.  Monitor back on Eliquis.  On PPI.  Slight drop in hemoglobin noted this morning.  But no evidence of more blood loss.  Has not had any bowel movement.  May be hydration effect?  Will monitor.  Slight drop but last time this happened 1 recheck was normal.  Not sure if some of that lab?  But even more of a decline today than yesterday.  Had a little bit of a nosebleed this morning.  Has not had a bowel movement for 48 hours.  But last bowel movement she now mentions that it was black.  GI reconsulted.  On PPI.  Monitor hemoglobin.  Transfuse if hemoglobin less than 7.       Elevated white count.  May be due to GI bleed.  But more elevated than yesterday but overall decreased from initial admission.  Check UA.  Denies any respiratory symptoms.  On steroids but stable dose from prior.  Slight worsening.  Monitor.       Hyperkalemia.  Will hold potassium and spironolactone and losartan.  Low potassium diet.  Will monitor.  Will give some Kayexalate.  Renal to be consulted.  Resolved.       DARWIN.   Not sure if due to dehydration.  Stop diuretics.  Check bladder scan and renal ultrasound and UA.  Avoid nephrotoxins.  Monitor.  Getting light dose of IV fluids.  Was n.p.o. for EGD.  Plus appetite is diminished.  Check postvoid residual.  Last diet load approximately 6 days ago.  Improving.  Avoid nephrotoxins.  Continue to hold diuretics.  No evidence of fluid overload.  Much improved.         UTI.  On IV antibiotics.  Urine culture positive for Klebsiella aerogenes sensitive to ceftriaxone.  Finished IV antibiotics.  Will DC.       RA.  Stable on  prednisone and hydroxychloroquine.  Follows up with rheum as an outpatient.        Hyperlipidemia.  On statin.       Abnormal CT chest.  With bilateral fibrosis and cystic changes.  Pulmonary following patient.        Urinary retention.  Added Flomax.  Straight cath protocol.  Urology consulted.  Patient unwilling to do straight cath and daughter does not want to do.  Explained to patient.  Alonso to be inserted.  And follow-up with urology in 1 week.        Hypomagnesemia.  Supplement and monitor.        Dizzy, orthostatic positive  Fall precautions.  Light dose of IV fluids due to history of CHF.  Added midodrine.  Discussed about from laying to sitting wait a couple minutes before standing and also from sitting to standing wait a couple minutes before beginning walking.  Parameters on blood pressure medicines.  And monitor.  Physical therapy did not do stairs due to dizziness.       Nosebleed.  Steriles saline gel.  Monitor.    DVT prophylaxis on Eliquis.  GI prophylaxis on PPI.  CODE STATUS full.  Discussed with patient.  MAGGIE RN taking care of pt.      **Certification      PHYSICIAN Certification of Need for Inpatient Hospitalization - Initial Certification    Patient will require inpatient services that will reasonably be expected to span two midnight's based on the clinical documentation in H+P.   Based on patients current state of illness, I anticipate that, after discharge, patient will require TBD.    Heike Sorto MD  3/5/2024

## 2024-03-05 NOTE — PHYSICAL THERAPY NOTE
PHYSICAL THERAPY TREATMENT NOTE - INPATIENT     Room Number: 521/521-A       Presenting Problem: patient admitted from home w/ SOB, found to have russell acute PE (Doppler for BLE negative for DVT, started on IV heparin)  Co-Morbidities : RA, breast cancer s/p lumpectomy 2016, COPD on RA, DM    Problem List  Principal Problem:    Other pulmonary embolism without acute cor pulmonale (HCC)  Active Problems:    Type 2 diabetes mellitus without retinopathy (HCC)    Anemia, iron deficiency    Dyslipidemia    Hypertension    Infiltrating ductal carcinoma of right breast, stage 1 (HCC)    Rheumatoid arthritis (HCC)    AVM (arteriovenous malformation) of duodenum, acquired    Chronic obstructive pulmonary disease, unspecified (HCC)    VTE (venous thromboembolism)    ABLA (acute blood loss anemia)    Hyperkalemia    DARWIN (acute kidney injury) (HCC)    Hypomagnesemia    Constipation    Urinary retention      PHYSICAL THERAPY ASSESSMENT   Patient demonstrates limited progress this session, goals  remain in progress.    Patient continues to function below baseline with bed mobility, transfers, gait, and stair negotiation.  Contributing factors to remaining limitations include decreased functional strength, decreased endurance/aerobic capacity, and medical status.  Next session anticipate patient to progress bed mobility, transfers, gait, and stair negotiation.  Physical Therapy will continue to follow patient for duration of hospitalization.    Patient continues to benefit from continued skilled PT services: at discharge to promote functional independence and safety with additional support and return home with home health PT. Discussed home setup with pt, recommend possible adjustment for pt to stay on main level temporarily as pt has been unable to manage stairs for several therapy visits.     PLAN  PT Treatment Plan: Bed mobility;Body mechanics;Energy conservation;Endurance;Patient education;Family education;Gait training;Transfer  training;Balance training;Strengthening  Frequency (Obs): 3-5x/week    SUBJECTIVE  \"I feel dizzy, like I have the chills.\"     OBJECTIVE  Precautions: Bed/chair alarm    WEIGHT BEARING RESTRICTION      No restrictions           PAIN ASSESSMENT   Ratin          BALANCE  Static Sitting: Good  Dynamic Sitting: Fair +  Static Standing: Fair  Dynamic Standing: Fair -    ACTIVITY TOLERANCE           BP: 116/51 (in sitting, in standing 87/59)  BP Location: Left arm  BP Method: Automatic  Patient Position: Sitting     O2 WALK       AM-PAC '6-Clicks' INPATIENT SHORT FORM - BASIC MOBILITY  How much difficulty does the patient currently have...  Patient Difficulty: Turning over in bed (including adjusting bedclothes, sheets and blankets)?: None   Patient Difficulty: Sitting down on and standing up from a chair with arms (e.g., wheelchair, bedside commode, etc.): A Little   Patient Difficulty: Moving from lying on back to sitting on the side of the bed?: A Little   How much help from another person does the patient currently need...   Help from Another: Moving to and from a bed to a chair (including a wheelchair)?: A Little   Help from Another: Need to walk in hospital room?: A Little   Help from Another: Climbing 3-5 steps with a railing?: A Lot     AM-PAC Score:  Raw Score: 18   Approx Degree of Impairment: 46.58%   Standardized Score (AM-PAC Scale): 43.63   CMS Modifier (G-Code): CK    FUNCTIONAL ABILITY STATUS  Functional Mobility/Gait Assessment  Gait Assistance:  (unable to attmpt due to orthostatics)  Distance (ft): -  Assistive Device:  (-)  Pattern:  (-)  Stairs:  (-)  Rolling:  NT  Supine to Sit:  NT  Sit to Supine:  NT  Sit to Stand: contact guard assist; multiple reps for blood pressure    The patient's Approx Degree of Impairment: 46.58% has been calculated based on documentation in the [facility] '6 clicks' Inpatient Daily Activity Short Form.  Research supports that patients with this level of impairment may  benefit from home with home care.  Final disposition will be made by interdisciplinary medical team.    Patient End of Session: Up in chair;Needs met;Call light within reach;RN aware of session/findings;All patient questions and concerns addressed;Alarm set    CURRENT GOALS   Goals to be met by: 3.5.24  Patient Goal Patient's self-stated goal is: Home w/ dtr   Goal #1 Patient is able to demonstrate supine - sit EOB @ level: independent      Goal #1   Current Status  SBA   Goal #2 Patient is able to demonstrate transfers Sit to/from Stand at assistance level: supervision with walker - rolling      Goal #2  Current Status  CGA with RW   Goal #3 Patient is able to ambulate 50 feet with assist device: walker - rolling at assistance level: supervision   Goal #3   Current Status  CGA>SBA 25 ft and 50 ft with RW and chair follow   Goal #4 Patient will negotiate 14 stairs/one curb w/ assistive device and supervision   Goal #4   Current Status  NT   Goal #5 Patient to demonstrate independence with home activity/exercise instructions provided to patient in preparation for discharge.   Goal #5   Current Status  Ongoing     Therapeutic Activity: 10 minutes

## 2024-03-06 ENCOUNTER — APPOINTMENT (OUTPATIENT)
Dept: GENERAL RADIOLOGY | Facility: HOSPITAL | Age: 84
End: 2024-03-06
Attending: INTERNAL MEDICINE
Payer: MEDICARE

## 2024-03-06 LAB
ANION GAP SERPL CALC-SCNC: 5 MMOL/L (ref 0–18)
ANTIBODY SCREEN: NEGATIVE
BASOPHILS # BLD AUTO: 0.05 X10(3) UL (ref 0–0.2)
BASOPHILS NFR BLD AUTO: 0.3 %
BILIRUB UR QL: NEGATIVE
BUN BLD-MCNC: 32 MG/DL (ref 9–23)
BUN/CREAT SERPL: 22.9 (ref 10–20)
CALCIUM BLD-MCNC: 9.1 MG/DL (ref 8.7–10.4)
CHLORIDE SERPL-SCNC: 107 MMOL/L (ref 98–112)
CLARITY UR: CLEAR
CO2 SERPL-SCNC: 23 MMOL/L (ref 21–32)
CREAT BLD-MCNC: 1.4 MG/DL
DEPRECATED RDW RBC AUTO: 53.9 FL (ref 35.1–46.3)
EGFRCR SERPLBLD CKD-EPI 2021: 37 ML/MIN/1.73M2 (ref 60–?)
EOSINOPHIL # BLD AUTO: 0.25 X10(3) UL (ref 0–0.7)
EOSINOPHIL NFR BLD AUTO: 1.7 %
ERYTHROCYTE [DISTWIDTH] IN BLOOD BY AUTOMATED COUNT: 16.5 % (ref 11–15)
GLUCOSE BLD-MCNC: 148 MG/DL (ref 70–99)
GLUCOSE BLDC GLUCOMTR-MCNC: 100 MG/DL (ref 70–99)
GLUCOSE BLDC GLUCOMTR-MCNC: 161 MG/DL (ref 70–99)
GLUCOSE BLDC GLUCOMTR-MCNC: 172 MG/DL (ref 70–99)
GLUCOSE BLDC GLUCOMTR-MCNC: 223 MG/DL (ref 70–99)
GLUCOSE UR-MCNC: NORMAL MG/DL
HCT VFR BLD AUTO: 23.2 %
HGB BLD-MCNC: 7.2 G/DL
HGB BLD-MCNC: 9.7 G/DL
HYALINE CASTS #/AREA URNS AUTO: PRESENT /LPF
IMM GRANULOCYTES # BLD AUTO: 0.12 X10(3) UL (ref 0–1)
IMM GRANULOCYTES NFR BLD: 0.8 %
KETONES UR-MCNC: NEGATIVE MG/DL
LEUKOCYTE ESTERASE UR QL STRIP.AUTO: 250
LYMPHOCYTES # BLD AUTO: 2.09 X10(3) UL (ref 1–4)
LYMPHOCYTES NFR BLD AUTO: 14 %
MAGNESIUM SERPL-MCNC: 1.4 MG/DL (ref 1.6–2.6)
MCH RBC QN AUTO: 28.5 PG (ref 26–34)
MCHC RBC AUTO-ENTMCNC: 31 G/DL (ref 31–37)
MCV RBC AUTO: 91.7 FL
MONOCYTES # BLD AUTO: 1.11 X10(3) UL (ref 0.1–1)
MONOCYTES NFR BLD AUTO: 7.5 %
NEUTROPHILS # BLD AUTO: 11.26 X10 (3) UL (ref 1.5–7.7)
NEUTROPHILS # BLD AUTO: 11.26 X10(3) UL (ref 1.5–7.7)
NEUTROPHILS NFR BLD AUTO: 75.7 %
NITRITE UR QL STRIP.AUTO: NEGATIVE
OSMOLALITY SERPL CALC.SUM OF ELEC: 290 MOSM/KG (ref 275–295)
PH UR: 5.5 [PH] (ref 5–8)
PLATELET # BLD AUTO: 337 10(3)UL (ref 150–450)
POTASSIUM SERPL-SCNC: 4.4 MMOL/L (ref 3.5–5.1)
PROT UR-MCNC: 50 MG/DL
RBC # BLD AUTO: 2.53 X10(6)UL
RBC #/AREA URNS AUTO: >10 /HPF
RH BLOOD TYPE: POSITIVE
SODIUM SERPL-SCNC: 135 MMOL/L (ref 136–145)
SP GR UR STRIP: 1.01 (ref 1–1.03)
UROBILINOGEN UR STRIP-ACNC: NORMAL
WBC # BLD AUTO: 14.9 X10(3) UL (ref 4–11)

## 2024-03-06 PROCEDURE — 99233 SBSQ HOSP IP/OBS HIGH 50: CPT | Performed by: INTERNAL MEDICINE

## 2024-03-06 PROCEDURE — 71046 X-RAY EXAM CHEST 2 VIEWS: CPT | Performed by: INTERNAL MEDICINE

## 2024-03-06 RX ORDER — FUROSEMIDE 10 MG/ML
20 INJECTION INTRAMUSCULAR; INTRAVENOUS ONCE
Status: COMPLETED | OUTPATIENT
Start: 2024-03-06 | End: 2024-03-06

## 2024-03-06 RX ORDER — MAGNESIUM OXIDE 400 MG/1
800 TABLET ORAL ONCE
Status: COMPLETED | OUTPATIENT
Start: 2024-03-06 | End: 2024-03-06

## 2024-03-06 RX ORDER — OCTREOTIDE ACETATE 100 UG/ML
100 INJECTION, SOLUTION INTRAVENOUS; SUBCUTANEOUS EVERY 8 HOURS
Qty: 90 ML | Refills: 0 | Status: SHIPPED | OUTPATIENT
Start: 2024-03-06 | End: 2024-04-05

## 2024-03-06 RX ORDER — POLYETHYLENE GLYCOL 3350 17 G/17G
17 POWDER, FOR SOLUTION ORAL DAILY PRN
Status: DISCONTINUED | OUTPATIENT
Start: 2024-03-06 | End: 2024-03-13

## 2024-03-06 RX ORDER — SODIUM CHLORIDE 9 MG/ML
INJECTION, SOLUTION INTRAVENOUS ONCE
Status: COMPLETED | OUTPATIENT
Start: 2024-03-06 | End: 2024-03-06

## 2024-03-06 NOTE — CONSULTS
[facility]  part of [facility]    Report of Consultation    Chester Bautista Patient Status:  Inpatient    1940 MRN [id number]   Location [facility]5W Attending Heike Sorto MD   Hosp Day # 12 PCP Heike Sorto MD     Reason for Consultation:  PE, anemia    History of Present Illness:  Chester Bautista is a a(n) 84 year old female admitted with SOB and found to have a PE.  Hx includes duodenal AVMs.  She was started on xarelto and developed symptomatic anemia,  Hgb 7.2.    History:  Past Medical History:   Diagnosis Date    Anxiety state     Cancer (HCC)     breast cancer 2018    Chronic obstructive pulmonary disease, unspecified (HCC) 2023    Chronic obstructive pulmonary disease, unspecified (HCC) 2023    COPD (chronic obstructive pulmonary disease) (HCC)     Diabetes (HCC)     Essential hypertension     Exposure to medical diagnostic radiation     High blood pressure     High cholesterol     History of blood transfusion 2022    low hemoglobin    Osteoarthritis     PONV (postoperative nausea and vomiting)     Pulmonary embolism (HCC)     Pulmonary emphysema (HCC)     Rheumatoid arthritis (HCC)      Past Surgical History:   Procedure Laterality Date    CATARACT EXTRACTION W/  INTRAOCULAR LENS IMPLANT Bilateral 2017    [facility]     COLONOSCOPY      COLONOSCOPY N/A 2022    Procedure: COLONOSCOPY;  Surgeon: Mikey Garcia MD;  Location: [facility] ENDOSCOPY    COLONOSCOPY N/A 06/15/2023    Procedure: COLONOSCOPY;  Surgeon: Mikey Garcia MD;  Location: [facility] ENDOSCOPY    CORRECT BUNION,SIMPLE      right foot      EGD  2023    Dr. Garcia; Duodenal AVM's x4 cauterized    HYSTERECTOMY      , no abnormal Paps, due to heavy bleeding with fibroids.  Not sure if it had bilateral salpingo-oophorectomy.    IR ANEURYSM REPAIRM      Computer assisted volumetric craniofomy with clipping of anterior cerebral artery.     Addressed in other phone call. LUMPECTOMY RIGHT      RADIATION RIGHT      ROTATOR CUFF REPAIR      1993    TOTAL KNEE REPLACEMENT Right 2010    TOTAL KNEE REPLACEMENT Left 07/02/2015    TRANSORAL INCISIONLESS FUNDOPLICATION - INTERNAL  01/25/2012 Fredy fundoplication at [facility] Dr. Fournier.    Fredy fundoplication at [facility] Dr. Fournier.    TRIGGER FINGER RELEASE      left hand  2005    YAG CAPSULOTOMY - OU - BOTH EYES Bilateral 09/2021    done in [place]     Family History   Problem Relation Age of Onset    Heart Surgery Mother         Leaky valve at 41 y/o.     Prostate Cancer Brother     Cancer Brother     Diabetes Maternal Grandmother     Diabetes Paternal Aunt     Breast Cancer Self 78    Breast Cancer Niece         before 50    Macular degeneration Neg     Glaucoma Neg       reports that she quit smoking about 27 years ago. Her smoking use included cigarettes. She smoked an average of 0.3 packs per day. She has never used smokeless tobacco. She reports that she does not drink alcohol and does not use drugs.    Allergies:  Allergies   Allergen Reactions    Celebrex [Celecoxib] PALPITATIONS    Penicillins ITCHING and SWELLING    Metformin And Related UNKNOWN    Olmesartan UNKNOWN       Medications:    Current Facility-Administered Medications:     iron sucrose (Venofer) 300 mg in sodium chloride 0.9% 250 mL IVPB, 300 mg, Intravenous, Daily    sodium chloride 0.9% infusion, , Intravenous, Once    furosemide (Lasix) 10 mg/mL injection 20 mg, 20 mg, Intravenous, Once    polyethylene glycol (PEG 3350) (Miralax) 17 g oral packet 17 g, 17 g, Oral, Daily PRN    midodrine (ProAmatine) tab 2.5 mg, 2.5 mg, Oral, TID    NIFEdipine ER (Procardia-XL) 24 hr tab 30 mg, 30 mg, Oral, Daily    saline ([place] Saline) nasal gel, , Nasal, PRN    insulin degludec 100 units/mL flextouch 12 Units, 12 Units, Subcutaneous, Nightly    vancomycin (Vancocin) cap 125 mg, 125 mg, Oral, Daily    pantoprazole (Protonix) DR tab 40 mg, 40 mg, Oral,  BID AC    magnesium hydroxide (Milk of Magnesia) 400 MG/5ML oral suspension 30 mL, 30 mL, Oral, Daily PRN    octreoTIDe (SandoSTATIN) 100 mcg/mL injection 100 mcg, 100 mcg, Subcutaneous, Q8H    ipratropium-albuterol (Duoneb) 0.5-2.5 (3) MG/3ML inhalation solution 3 mL, 3 mL, Nebulization, Q6H PRN    tamsulosin (Flomax) cap 0.4 mg, 0.4 mg, Oral, Daily    [COMPLETED] apixaban (Eliquis) tab 10 mg, 10 mg, Oral, BID **FOLLOWED BY** apixaban (Eliquis) tab 5 mg, 5 mg, Oral, BID    traMADol (Ultram) tab 50 mg, 50 mg, Oral, Q12H PRN    glucose (Dex4) 15 GM/59ML oral liquid 15 g, 15 g, Oral, Q15 Min PRN **OR** glucose (Glutose) 40% oral gel 15 g, 15 g, Oral, Q15 Min PRN **OR** glucose-vitamin C (Dex-4) chewable tab 4 tablet, 4 tablet, Oral, Q15 Min PRN **OR** dextrose 50% injection 50 mL, 50 mL, Intravenous, Q15 Min PRN **OR** glucose (Dex4) 15 GM/59ML oral liquid 30 g, 30 g, Oral, Q15 Min PRN **OR** glucose (Glutose) 40% oral gel 30 g, 30 g, Oral, Q15 Min PRN **OR** glucose-vitamin C (Dex-4) chewable tab 8 tablet, 8 tablet, Oral, Q15 Min PRN    insulin aspart (NovoLOG) 100 Units/mL FlexPen 1-7 Units, 1-7 Units, Subcutaneous, TID CC    cyclobenzaprine (Flexeril) tab 10 mg, 10 mg, Oral, Nightly    acetaminophen (Tylenol Extra Strength) tab 500 mg, 500 mg, Oral, Q6H PRN    albuterol (Ventolin HFA) 108 (90 Base) MCG/ACT inhaler 2 puff, 2 puff, Inhalation, Q4H PRN    carvedilol (Coreg) tab 25 mg, 25 mg, Oral, BID with meals    fluticasone furoate-vilanterol (Breo Ellipta) 100-25 MCG/ACT inhaler 1 puff, 1 puff, Inhalation, Daily    glycopyrrolate (Robinul) tab 1 mg, 1 mg, Oral, TID    hydroxychloroquine (Plaquenil) tab 200 mg, 200 mg, Oral, Daily    cetirizine (ZyrTEC) tab 5 mg, 5 mg, Oral, Daily    montelukast (Singulair) tab 10 mg, 10 mg, Oral, Nightly    predniSONE (Deltasone) tab 5 mg, 5 mg, Oral, Daily    rosuvastatin (Crestor) tab 10 mg, 10 mg, Oral, Nightly    [Held by provider] torsemide (Demadex) tab 20 mg, 20 mg, Oral,  Daily    melatonin tab 3 mg, 3 mg, Oral, Nightly PRN    polyethylene glycol (PEG 3350) (Miralax) 17 g oral packet 17 g, 17 g, Oral, Daily PRN    sennosides (Senokot) tab 17.2 mg, 17.2 mg, Oral, Nightly PRN    bisacodyl (Dulcolax) 10 MG rectal suppository 10 mg, 10 mg, Rectal, Daily PRN    ondansetron (Zofran) 4 MG/2ML injection 4 mg, 4 mg, Intravenous, Q6H PRN    metoclopramide (Reglan) 5 mg/mL injection 5 mg, 5 mg, Intravenous, Q8H PRN    benzonatate (Tessalon) cap 200 mg, 200 mg, Oral, TID PRN    guaiFENesin (Robitussin) 100 MG/5 ML oral liquid 200 mg, 200 mg, Oral, Q4H PRN    glycerin-hypromellose- (Artifical Tears) 0.2-0.2-1 % ophthalmic solution 1 drop, 1 drop, Both Eyes, QID PRN    sodium chloride (Saline Mist) 0.65 % nasal solution 1 spray, 1 spray, Each Nare, Q3H PRN    Review of Systems:  Pertinent items are noted in HPI.    Physical Exam:   General: Alert, orientated x3.  Cooperative.  No apparent distress.  Vital Signs:  Blood pressure 123/61, pulse 96, temperature 98.6 °F (37 °C), temperature source Oral, resp. rate 20, height 61\", weight 129 lb 6.4 oz (58.7 kg), SpO2 95%.  HEENT: Exam is unremarkable.  Neck: Supple.  Lungs: Normal respiratory effort  Cardiac: Regular rate and rhythm.  Abdomen:   Nontender.  Extremities:  No lower extremity edema noted.  Skin: Normal texture and turgor.    Laboratory Data:  Lab Results   Component Value Date    WBC 14.9 03/06/2024    HGB 7.2 03/06/2024    HCT 23.2 03/06/2024    .0 03/06/2024    CREATSERUM 1.40 03/06/2024    BUN 32 03/06/2024     03/06/2024    K 4.4 03/06/2024     03/06/2024    CO2 23.0 03/06/2024     03/06/2024    CA 9.1 03/06/2024    MG 1.4 03/06/2024       Imaging:  XR CHEST PA + LAT CHEST (CPT=71046)    Result Date: 3/6/2024  CONCLUSION:  1. Emphysema with linear scarring and or atelectasis in the mid and upper lungs.  No evidence of pneumonia. 2. Right Port-A-Cath with distal 2 cm kink back on itself and the tip  pointing cephalad.     Dictated by (CST): Michael Gonzalez MD on 3/06/2024 at 4:20 PM     Finalized by (CST): Michael Gonzalez MD on 3/06/2024 at 4:25 PM           Impression:  Patient Active Problem List   Diagnosis    Adult general medical examination    Vaccine counseling    Colon cancer screening    Type 2 diabetes mellitus without retinopathy (HCC)    Sarcoidosis    Anemia, iron deficiency    Dyslipidemia    Hypercalcemia    Hypertension    Infiltrating ductal carcinoma of right breast, stage 1 (HCC)    Rheumatoid arthritis (HCC)    Osteoarthrosis    Peripheral vascular disease due to secondary diabetes mellitus (HCC)    Malabsorption (HCC)    Neuropathy    AVM (arteriovenous malformation) of duodenum, acquired    Pseudophakia of both eyes    Vitreous floaters of both eyes    Glaucoma suspect of both eyes    Iron deficiency anemia due to chronic blood loss    Malabsorption of iron (HCC)    Port-A-Cath in place    Chronic obstructive pulmonary disease, unspecified (HCC)    Adnexal mass    Encounter for care related to vascular access port    High risk medication use    Meibomian gland dysfunction (MGD) of both eyes    Hypokalemia    Other pulmonary embolism without acute cor pulmonale (HCC)    VTE (venous thromboembolism)    ABLA (acute blood loss anemia)    Hyperkalemia    DARWIN (acute kidney injury) (HCC)    Hypomagnesemia    Constipation    Urinary retention    AVM (arteriovenous malformation) of duodenum, acquired with hemorrhage    Orthostatic dizziness     Assessment/Plan:  85yo admitted with CT findings of a PE who developed symptomatic anemia. Anticoagulation is contraindicated thus she will require a temporary IVC filter.  Procedure including benefits and risks reviewed with patient and her daughter who state understanding and agree to proceed.     Thank you for allowing me to participate in the care of your patient.    ROSSY HAGER, APRN  3/6/2024  4:31 PM

## 2024-03-06 NOTE — CM/SW NOTE
3/7 1123am: RACHAEL contacted [facility] specialty pharmacy and the prescription has not been processed through the pt's insurance at this time.  RACHAEL was instructed to call back.  The pharmacy was not able to tell SW if a prior auth is needed or not.  SW will call back.   -------------------  3/6 246pm: subcutaneous octreotide rx has been sent to the pt's pharmacy.  Per the pharmacy they don't think a prior auth is needed, but they just sent it to their specialty pharmacy department.  They requested SW contact the pharmacy again tomorrow to confirm.  --------------------  3/6 921am: SandoSTATIN [place] 20MG kit prior auth has been denied by insurance.  RACHAEL notified BILL Boateng, and Dr. Zoila Bolton Ma via Epic chat.  Plan will be for subcutaneous octreotide which is likely also require a PA.    SW to be notified when Rx has been entered for the subcutaneous medication.      Cover My Meds: Chester Bautista (Key: [id number])  PA Case ID #: [id number]  Drug  SandoSTATIN LAR [place] 20MG kit  Denied on March 5    ROBERT Jack ext [phone]

## 2024-03-06 NOTE — PLAN OF CARE
A&Ox4. Pt ambulates standby with walker, monitoring blood glucose levels per order- ACHS, voiding via benson- remains in place, tolerating diet, on room air, blood infusing. Frequent rounding by nursing staff. Safety precautions maintained/call light within reach. Plan for NPO at midnight.    Problem: Patient Centered Care  Goal: Patient preferences are identified and integrated in the patient's plan of care  Description: Interventions:  - What would you like us to know as we care for you? From home with family  - Provide timely, complete, and accurate information to patient/family  - Incorporate patient and family knowledge, values, beliefs, and cultural backgrounds into the planning and delivery of care  - Encourage patient/family to participate in care and decision-making at the level they choose  - Honor patient and family perspectives and choices  Outcome: Progressing     Problem: CARDIOVASCULAR - ADULT  Goal: Maintains optimal cardiac output and hemodynamic stability  Description: INTERVENTIONS:  - Monitor vital signs, rhythm, and trends  - Monitor for bleeding, hypotension and signs of decreased cardiac output  - Evaluate effectiveness of vasoactive medications to optimize hemodynamic stability  - Monitor arterial and/or venous puncture sites for bleeding and/or hematoma  - Assess quality of pulses, skin color and temperature  - Assess for signs of decreased coronary artery perfusion - ex. Angina  - Evaluate fluid balance, assess for edema, trend weights  Outcome: Progressing     Problem: RESPIRATORY - ADULT  Goal: Achieves optimal ventilation and oxygenation  Description: INTERVENTIONS:  - Assess for changes in respiratory status  - Assess for changes in mentation and behavior  - Position to facilitate oxygenation and minimize respiratory effort  - Oxygen supplementation based on oxygen saturation or ABGs  - Provide Smoking Cessation handout, if applicable  - Encourage broncho-pulmonary hygiene including  cough, deep breathe, Incentive Spirometry  - Assess the need for suctioning and perform as needed  - Assess and instruct to report SOB or any respiratory difficulty  - Respiratory Therapy support as indicated  - Manage/alleviate anxiety  - Monitor for signs/symptoms of CO2 retention  Outcome: Progressing     Problem: PAIN - ADULT  Goal: Verbalizes/displays adequate comfort level or patient's stated pain goal  Description: INTERVENTIONS:  - Encourage pt to monitor pain and request assistance  - Assess pain using appropriate pain scale  - Administer analgesics based on type and severity of pain and evaluate response  - Implement non-pharmacological measures as appropriate and evaluate response  - Consider cultural and social influences on pain and pain management  - Manage/alleviate anxiety  - Utilize distraction and/or relaxation techniques  - Monitor for opioid side effects  - Notify MD/LIP if interventions unsuccessful or patient reports new pain  - Anticipate increased pain with activity and pre-medicate as appropriate  Outcome: Progressing     Problem: RISK FOR INFECTION - ADULT  Goal: Absence of fever/infection during anticipated neutropenic period  Description: INTERVENTIONS  - Monitor WBC  - Administer growth factors as ordered  - Implement neutropenic guidelines  Outcome: Progressing     Problem: SAFETY ADULT - FALL  Goal: Free from fall injury  Description: INTERVENTIONS:  - Assess pt frequently for physical needs  - Identify cognitive and physical deficits and behaviors that affect risk of falls.  - [place] fall precautions as indicated by assessment.  - Educate pt/family on patient safety including physical limitations  - Instruct pt to call for assistance with activity based on assessment  - Modify environment to reduce risk of injury  - Provide assistive devices as appropriate  - Consider OT/PT consult to assist with strengthening/mobility  - Encourage toileting schedule  Outcome: Progressing

## 2024-03-06 NOTE — PLAN OF CARE
Pt noted with orthostatic hypotension while working with physical therapy.  MD added 250cc fluid bolus and midodrine.  Re-checked orthostatics a few hours after administration with improvement, although pt states she still feels dizzy upon standing.  Pt is afebrile, stable on room air.  Hgb re-checked this afternoon, stable at 8.2. Fair appetite, blood sugar monitored and managed.  Daughter currently at bedside, updated on plan of care. No complaints or concerns at this time.      Problem: Patient Centered Care  Goal: Patient preferences are identified and integrated in the patient's plan of care  Description: Interventions:  - What would you like us to know as we care for you? From home with family  - Provide timely, complete, and accurate information to patient/family  - Incorporate patient and family knowledge, values, beliefs, and cultural backgrounds into the planning and delivery of care  - Encourage patient/family to participate in care and decision-making at the level they choose  - Honor patient and family perspectives and choices  Outcome: Progressing     Problem: CARDIOVASCULAR - ADULT  Goal: Maintains optimal cardiac output and hemodynamic stability  Description: INTERVENTIONS:  - Monitor vital signs, rhythm, and trends  - Monitor for bleeding, hypotension and signs of decreased cardiac output  - Evaluate effectiveness of vasoactive medications to optimize hemodynamic stability  - Monitor arterial and/or venous puncture sites for bleeding and/or hematoma  - Assess quality of pulses, skin color and temperature  - Assess for signs of decreased coronary artery perfusion - ex. Angina  - Evaluate fluid balance, assess for edema, trend weights  Outcome: Progressing     Problem: RESPIRATORY - ADULT  Goal: Achieves optimal ventilation and oxygenation  Description: INTERVENTIONS:  - Assess for changes in respiratory status  - Assess for changes in mentation and behavior  - Position to facilitate oxygenation and  minimize respiratory effort  - Oxygen supplementation based on oxygen saturation or ABGs  - Provide Smoking Cessation handout, if applicable  - Encourage broncho-pulmonary hygiene including cough, deep breathe, Incentive Spirometry  - Assess the need for suctioning and perform as needed  - Assess and instruct to report SOB or any respiratory difficulty  - Respiratory Therapy support as indicated  - Manage/alleviate anxiety  - Monitor for signs/symptoms of CO2 retention  Outcome: Progressing     Problem: PAIN - ADULT  Goal: Verbalizes/displays adequate comfort level or patient's stated pain goal  Description: INTERVENTIONS:  - Encourage pt to monitor pain and request assistance  - Assess pain using appropriate pain scale  - Administer analgesics based on type and severity of pain and evaluate response  - Implement non-pharmacological measures as appropriate and evaluate response  - Consider cultural and social influences on pain and pain management  - Manage/alleviate anxiety  - Utilize distraction and/or relaxation techniques  - Monitor for opioid side effects  - Notify MD/LIP if interventions unsuccessful or patient reports new pain  - Anticipate increased pain with activity and pre-medicate as appropriate  Outcome: Progressing     Problem: RISK FOR INFECTION - ADULT  Goal: Absence of fever/infection during anticipated neutropenic period  Description: INTERVENTIONS  - Monitor WBC  - Administer growth factors as ordered  - Implement neutropenic guidelines  Outcome: Progressing     Problem: SAFETY ADULT - FALL  Goal: Free from fall injury  Description: INTERVENTIONS:  - Assess pt frequently for physical needs  - Identify cognitive and physical deficits and behaviors that affect risk of falls.  - [place] fall precautions as indicated by assessment.  - Educate pt/family on patient safety including physical limitations  - Instruct pt to call for assistance with activity based on assessment  - Modify environment to  reduce risk of injury  - Provide assistive devices as appropriate  - Consider OT/PT consult to assist with strengthening/mobility  - Encourage toileting schedule  Outcome: Progressing     Problem: Patient/Family Goals  Goal: Patient/Family Long Term Goal  Description: Patient's Long Term Goal: to go home    Interventions:  - - Monitor vitals  - Monitor appropriate labs  - Pain management  - Follow MD order  - Diagnostics per order  - Update/Informing patient and family on plan of care  - Discharge planning  - See additional Care Plan goals for specific interventions  Outcome: Progressing  Goal: Patient/Family Short Term Goal  Description: Patient's Short Term Goal: to feel better    Interventions:   - - Monitor vitals  - Monitor appropriate labs  - Pain management  - Follow MD order  - Diagnostics per order  - Update/Informing patient and family on plan of care  - Discharge planning  - See additional Care Plan goals for specific interventions  Outcome: Progressing     Problem: DISCHARGE PLANNING  Goal: Discharge to home or other facility with appropriate resources  Description: INTERVENTIONS:  - Identify barriers to discharge w/pt and caregiver  - Include patient/family/discharge partner in discharge planning  - Arrange for needed discharge resources and transportation as appropriate  - Identify discharge learning needs (meds, wound care, etc)  - Arrange for interpreters to assist at discharge as needed  - Consider post-discharge preferences of patient/family/discharge partner  - Complete POLST form as appropriate  - Assess patient's ability to be responsible for managing their own health  - Refer to Case Management Department for coordinating discharge planning if the patient needs post-hospital services based on physician/LIP order or complex needs related to functional status, cognitive ability or social support system  Outcome: Progressing

## 2024-03-06 NOTE — PHYSICAL THERAPY NOTE
03/06/24 0923   VISIT TYPE   PT Inpatient Visit Type (Documentation Required) Attempted Treatment  (pt refused)

## 2024-03-06 NOTE — PROGRESS NOTES
[facility]     Gastroenterology Progress Note    Chester Bautista Patient Status:  Inpatient    1940 MRN [id number]   Location [facility]5W Attending Heike Sorto MD   Hosp Day # 12 PCP Heike Sorto MD       Subjective:   Feels fatigued and tired today. She has not had a bowel movement since colonoscopy prep last week. No abdominal pain, n/v.      Objective:   Blood pressure 123/61, pulse 96, temperature 98.6 °F (37 °C), temperature source Oral, resp. rate 20, height 5' 1\" (1.549 m), weight 129 lb 6.4 oz (58.7 kg), SpO2 95%. Body mass index is 24.45 kg/m².    General: awake, alert and oriented, no acute distress  HEENT: moist mucus membranes  PULM: no conversational dyspnea  CARDIOVASCULAR: regular rate and rhythm, the extremities are warm and well perfused  GI: soft, non-tender, non-distended, + BS, no rebound/guarding   EXTREMITIES: no edema, moving all extremities  SKIN: no visible rash  NEURO: appropriate and interactive    Assessment and Plan:   Chester Bautista is a a(n) 84 year old female w/ a history of breast cancer s/p lumpectomy and radiation, rheumatoid arthritis, COPD, HTN, HLD, DM, who presented with shortness of breath found to have new PE started on XARELTO this admission. GI consulted for acute on chronic anemia.    EGD with multiple gastric avm's s/p APC and multiple duodenal avms left undisturbed. Resumed on XARELTO 2-28, hgb drifted down to 7.2 this morning and she feels symptomatic.     - consider giving 1 unit prbc today  - anticoagulation will be an ongoing issue in this patient with significant AVM disease. ? any alternative such as IVC filter  - continue octreotide subcutaneous, and PA process for sandostatin LAR  - continue PPI  - will consider repeat EGD with APC tomorrow if hgb continues to decline. However she recently underwent cautery of gastric and proximal duodenal avms in two months ago and now multiple new ones have  grown. Suspect she will have chronic AVM disease given its natural course. Utility of prophylactic endoscopic treatment is not clear as she will continue to grow new ones        Zoila Mcqueen MD  [facility] Gastroenterology      Results:     Lab Results   Component Value Date    WBC 14.9 (H) 03/06/2024    HGB 7.2 (L) 03/06/2024    HCT 23.2 (L) 03/06/2024    .0 03/06/2024    CREATSERUM 1.40 (H) 03/06/2024    BUN 32 (H) 03/06/2024     (L) 03/06/2024    K 4.4 03/06/2024     03/06/2024    CO2 23.0 03/06/2024     (H) 03/06/2024    CA 9.1 03/06/2024    ALB 3.4 02/24/2024    ALKPHO 63 02/24/2024    BILT 0.2 02/24/2024    TP 6.4 02/24/2024    AST 18 02/24/2024    ALT <7 (L) 02/24/2024    PTT 69.9 (H) 02/28/2024    INR 0.93 12/14/2023    TSH 0.622 10/20/2023    DDIMER 1.60 (H) 02/23/2024    ESRML 91 (H) 09/14/2023    CRP 1.10 (H) 09/14/2023    MG 1.4 (L) 03/06/2024    B12 748 10/20/2023       No results found.

## 2024-03-06 NOTE — PLAN OF CARE
Pt alert and oriented x 4. Vital signs stable at this time. Pt denies dizziness and afebrile during the night. blood sugar monitored as ordered- long acting insulin administered.  Prn pain med administered. Alonso in place- good urine output. Call light within reach  Problem: Patient Centered Care  Goal: Patient preferences are identified and integrated in the patient's plan of care  Description: Interventions:  - What would you like us to know as we care for you? From home with family  - Provide timely, complete, and accurate information to patient/family  - Incorporate patient and family knowledge, values, beliefs, and cultural backgrounds into the planning and delivery of care  - Encourage patient/family to participate in care and decision-making at the level they choose  - Honor patient and family perspectives and choices  Outcome: Progressing     Problem: CARDIOVASCULAR - ADULT  Goal: Maintains optimal cardiac output and hemodynamic stability  Description: INTERVENTIONS:  - Monitor vital signs, rhythm, and trends  - Monitor for bleeding, hypotension and signs of decreased cardiac output  - Evaluate effectiveness of vasoactive medications to optimize hemodynamic stability  - Monitor arterial and/or venous puncture sites for bleeding and/or hematoma  - Assess quality of pulses, skin color and temperature  - Assess for signs of decreased coronary artery perfusion - ex. Angina  - Evaluate fluid balance, assess for edema, trend weights  Outcome: Progressing     Problem: RESPIRATORY - ADULT  Goal: Achieves optimal ventilation and oxygenation  Description: INTERVENTIONS:  - Assess for changes in respiratory status  - Assess for changes in mentation and behavior  - Position to facilitate oxygenation and minimize respiratory effort  - Oxygen supplementation based on oxygen saturation or ABGs  - Provide Smoking Cessation handout, if applicable  - Encourage broncho-pulmonary hygiene including cough, deep breathe, Incentive  Spirometry  - Assess the need for suctioning and perform as needed  - Assess and instruct to report SOB or any respiratory difficulty  - Respiratory Therapy support as indicated  - Manage/alleviate anxiety  - Monitor for signs/symptoms of CO2 retention  Outcome: Progressing     Problem: PAIN - ADULT  Goal: Verbalizes/displays adequate comfort level or patient's stated pain goal  Description: INTERVENTIONS:  - Encourage pt to monitor pain and request assistance  - Assess pain using appropriate pain scale  - Administer analgesics based on type and severity of pain and evaluate response  - Implement non-pharmacological measures as appropriate and evaluate response  - Consider cultural and social influences on pain and pain management  - Manage/alleviate anxiety  - Utilize distraction and/or relaxation techniques  - Monitor for opioid side effects  - Notify MD/LIP if interventions unsuccessful or patient reports new pain  - Anticipate increased pain with activity and pre-medicate as appropriate  Outcome: Progressing     Problem: RISK FOR INFECTION - ADULT  Goal: Absence of fever/infection during anticipated neutropenic period  Description: INTERVENTIONS  - Monitor WBC  - Administer growth factors as ordered  - Implement neutropenic guidelines  Outcome: Progressing     Problem: SAFETY ADULT - FALL  Goal: Free from fall injury  Description: INTERVENTIONS:  - Assess pt frequently for physical needs  - Identify cognitive and physical deficits and behaviors that affect risk of falls.  - [place] fall precautions as indicated by assessment.  - Educate pt/family on patient safety including physical limitations  - Instruct pt to call for assistance with activity based on assessment  - Modify environment to reduce risk of injury  - Provide assistive devices as appropriate  - Consider OT/PT consult to assist with strengthening/mobility  - Encourage toileting schedule  Outcome: Progressing     Problem: Patient/Family Goals  Goal:  Patient/Family Long Term Goal  Description: Patient's Long Term Goal: to go home    Interventions:  - - Monitor vitals  - Monitor appropriate labs  - Pain management  - Follow MD order  - Diagnostics per order  - Update/Informing patient and family on plan of care  - Discharge planning  - See additional Care Plan goals for specific interventions  Outcome: Progressing  Goal: Patient/Family Short Term Goal  Description: Patient's Short Term Goal: to feel better    Interventions:   - - Monitor vitals  - Monitor appropriate labs  - Pain management  - Follow MD order  - Diagnostics per order  - Update/Informing patient and family on plan of care  - Discharge planning  - See additional Care Plan goals for specific interventions  Outcome: Progressing     Problem: DISCHARGE PLANNING  Goal: Discharge to home or other facility with appropriate resources  Description: INTERVENTIONS:  - Identify barriers to discharge w/pt and caregiver  - Include patient/family/discharge partner in discharge planning  - Arrange for needed discharge resources and transportation as appropriate  - Identify discharge learning needs (meds, wound care, etc)  - Arrange for interpreters to assist at discharge as needed  - Consider post-discharge preferences of patient/family/discharge partner  - Complete POLST form as appropriate  - Assess patient's ability to be responsible for managing their own health  - Refer to Case Management Department for coordinating discharge planning if the patient needs post-hospital services based on physician/LIP order or complex needs related to functional status, cognitive ability or social support system  Outcome: Progressing

## 2024-03-06 NOTE — PROGRESS NOTES
[facility]  part of [facility]    Progress Note    Chester Bautista Patient Status:  Inpatient    1940 MRN [id number]   Location [facility]5W Attending Heike Sorto MD   Hosp Day # 12 PCP Heike Sorto MD     Chief Complaint: GIB.     Subjective:     Constitutional:  Positive for activity change and fatigue. Negative for appetite change, chills, diaphoresis, fever and unexpected weight change.   HENT:  Negative for nosebleeds and trouble swallowing. Mouth sores: This AM..   Respiratory:  Negative for apnea, cough, choking, chest tightness, shortness of breath, wheezing and stridor.    Cardiovascular:  Negative for chest pain, palpitations and leg swelling.   Gastrointestinal:  Negative for heartburn, nausea, vomiting, abdominal pain, diarrhea, constipation (Last BM> 48 hrs. Not eatting much due to NPO.), blood in stool, abdominal distention, anal bleeding and rectal pain.        2 Bm this am and brownish nonbloody.    Endocrine: Negative for cold intolerance, heat intolerance, polydipsia, polyphagia and polyuria.   Genitourinary:  Negative for bladder incontinence, dysuria, urgency, frequency, hematuria, flank pain and difficulty urinating.   Musculoskeletal:  Myalgias: Radiates up when lays head down on bed. Pillow at home is different. Had Ct done because was worse..   Neurological:  Positive for dizziness, weakness and light-headedness. Negative for tremors, seizures, syncope, facial asymmetry, speech difficulty, numbness and headaches.        Per patient she usually gets lightheaded at home.  She is careful from laying to sitting she waits a couple minutes.     Psychiatric/Behavioral:  Negative for suicidal ideas, hallucinations, behavioral problems, confusion, sleep disturbance, self-injury, decreased concentration, agitation and depressed mood. The patient is not nervous/anxious and is not hyperactive.    All other systems reviewed and are  negative.      Objective:   Blood pressure 123/61, pulse 96, temperature 98.6 °F (37 °C), temperature source Oral, resp. rate 20, height 5' 1\" (1.549 m), weight 129 lb 6.4 oz (58.7 kg), SpO2 95%.  Physical Exam  Vitals and nursing note reviewed.   Constitutional:       General: She is not in acute distress.     Appearance: Normal appearance. She is well-groomed. She is not ill-appearing, toxic-appearing or diaphoretic.      Interventions: She is not intubated.  Eyes:      General: No scleral icterus.  Cardiovascular:      Rate and Rhythm: Normal rate and regular rhythm.      Pulses: Normal pulses.   Pulmonary:      Effort: No tachypnea, bradypnea, accessory muscle usage, prolonged expiration, respiratory distress or retractions. She is not intubated.      Breath sounds: No stridor, decreased air movement or transmitted upper airway sounds. No decreased breath sounds, wheezing, rhonchi or rales.   Chest:      Chest wall: No tenderness.   Abdominal:      General: Bowel sounds are normal. There is no distension.      Palpations: Abdomen is soft.      Tenderness: There is no abdominal tenderness. There is no right CVA tenderness, left CVA tenderness, guarding or rebound. Negative signs include Hung's sign.   Musculoskeletal:      Right lower leg: No edema.      Left lower leg: No edema.   Neurological:      Mental Status: She is alert and oriented to person, place, and time.   Psychiatric:         Mood and Affect: Mood normal.         Behavior: Behavior is cooperative.       Results:   Lab Results   Component Value Date    WBC 14.9 (H) 03/06/2024    HGB 7.2 (L) 03/06/2024    HCT 23.2 (L) 03/06/2024    .0 03/06/2024    CREATSERUM 1.40 (H) 03/06/2024    BUN 32 (H) 03/06/2024     (L) 03/06/2024    K 4.4 03/06/2024     03/06/2024    CO2 23.0 03/06/2024     (H) 03/06/2024    CA 9.1 03/06/2024    ALB 3.4 02/24/2024    ALKPHO 63 02/24/2024    BILT 0.2 02/24/2024    TP 6.4 02/24/2024    AST 18  02/24/2024    ALT <7 (L) 02/24/2024    PTT 69.9 (H) 02/28/2024    INR 0.93 12/14/2023    TSH 0.622 10/20/2023    DDIMER 1.60 (H) 02/23/2024    ESRML 91 (H) 09/14/2023    CRP 1.10 (H) 09/14/2023    MG 1.4 (L) 03/06/2024    TROPHS 9 02/23/2024    B12 748 10/20/2023       No results found.        Assessment & Plan:     PE (pulmonary thromboembolism) (HCC) from right lower lobe pulmonary artery to multiple segmental arteries on CT 2-23-24.   Without acute cor pulmonale.  Doppler negative for DVT on 2-23-24.   Back on Eliquis.  Monitor hemoglobin.  Not sure of duration whether will be 6 months or longer due to history of breast cancer.      Type 2 diabetes mellitus without retinopathy (HCC)  Sugars are higher.  Off oral hypoglycemics while in hospital.  Sliding scale insulin, hypoglycemic protocol, Accu-Cheks.  Increase Levemir. Monitor.       Hypertension  Generally well-controlled but on the lower limits of normal.  Spironolactone and losartan are being held due to hyperkalemia.  Monitor.      Chronic obstructive pulmonary disease, unspecified (HCC)  Stable.  Nebs as needed.  Pulmonary following patient.      ABLA (acute blood loss anemia)  History of AVMs.  Status post endoscopy.  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 Multiple gastric and duodenal AVMs. She recently underwent cautery of gastric and proximal duodenal avms in two months ago and now multiple new ones have grown. Suspect she will have chronic AVM disease given its natural course. Utility of prophylactic endoscopic treatment is not clear as she will continue to grow new ones. Suggest medical management,.).  GI following patient.  Hemoglobin improved status posttransfusion 1 unit PRBCs.  Monitor back on Eliquis.  On PPI.  Slight drop in hemoglobin noted this morning.  But no evidence of more blood loss.  Has not had any bowel movement.  May be hydration effect?  Will monitor.  Slight drop but last time this happened 1  recheck was normal.  Not sure if some of that lab?  But even more of a decline today than yesterday.  Had a little bit of a nosebleed this morning.  Has not had a bowel movement for 48 hours.  But last bowel movement she now mentions that it was black.  GI reconsulted.  On PPI.  Monitor hemoglobin.  Slight drop. Small amount with epistaxis yetserday and Bm brownish today after no BM X 72 hrs.  GI re-evaluated possible EGD josé miguel.        Elevated white count.  May be due to GI bleed.  But more elevated than yesterday but overall decreased from initial admission.  Check UA.  Denies any respiratory symptoms.  On steroids but stable dose from prior.  Slight worsening.  Check urien and CXR.        Hyperkalemia.  Will hold potassium and spironolactone and losartan.  Low potassium diet.  Will monitor.  Will give some Kayexalate.  Renal to be consulted.  Resolved.       DARWIN.   Not sure if due to dehydration.  Stop diuretics.  Check bladder scan and renal ultrasound and UA.  Avoid nephrotoxins.  Monitor.  Getting light dose of IV fluids.  Was n.p.o. for EGD.  Plus appetite is diminished.  Check postvoid residual.  Last diet load approximately 6 days ago.  Improving.  Avoid nephrotoxins.  Continue to hold diuretics.  No evidence of fluid overload.  Slightly worse today. Will give 1 unit PRBC's today.        UTI.  On IV antibiotics.  Urine culture positive for Klebsiella aerogenes sensitive to ceftriaxone.  Finished IV antibiotics.  CT'ed.        RA.  Stable on prednisone and hydroxychloroquine.  Follows up with rheum as an outpatient.        Hyperlipidemia.  On statin.       Abnormal CT chest.  With bilateral fibrosis and cystic changes.  Pulmonary following patient.        Urinary retention.  Added Flomax.  Straight cath protocol.  Urology consulted.  Patient unwilling to do straight cath and daughter does not want to do.  Explained to patient.  Alonso inserted.  And follow-up with urology in 1 week.         Hypomagnesemia.  Supplement and monitor.        Dizzy, orthostatic positive  Fall precautions.  Light dose of IV fluids due to history of CHF.  Added midodrine.  Discussed about from laying to sitting wait a couple minutes before standing and also from sitting to standing wait a couple minutes before beginning walking.  Parameters on blood pressure medicines.  And monitor.  Physical therapy did not do stairs due to dizziness. Sill with lightheadedness when gets up.  Will give 1 unit PRBC's.        Nosebleed .  Approx. 1 Tsbp. yetserday  Steriles saline gel.  Monitor. Resolved.     DVT prophylaxis on Eliquis.  GI prophylaxis on PPI.  CODE STATUS full.  Discussed with patient.  DW daughter Brea diallo in room and granddaughter in room with pt. permission.   MAGGIE RN taking care of pt.      **Certification      PHYSICIAN Certification of Need for Inpatient Hospitalization - Initial Certification    Patient will require inpatient services that will reasonably be expected to span two midnight's based on the clinical documentation in H+P.   Based on patients current state of illness, I anticipate that, after discharge, patient will require TBD.    Heike Sorto MD  3/6/2024

## 2024-03-07 ENCOUNTER — APPOINTMENT (OUTPATIENT)
Dept: INTERVENTIONAL RADIOLOGY/VASCULAR | Facility: HOSPITAL | Age: 84
End: 2024-03-07
Attending: NURSE PRACTITIONER
Payer: MEDICARE

## 2024-03-07 LAB
ANION GAP SERPL CALC-SCNC: 9 MMOL/L (ref 0–18)
BASOPHILS # BLD AUTO: 0.07 X10(3) UL (ref 0–0.2)
BASOPHILS NFR BLD AUTO: 0.5 %
BUN BLD-MCNC: 30 MG/DL (ref 9–23)
BUN/CREAT SERPL: 22.7 (ref 10–20)
CALCIUM BLD-MCNC: 9.3 MG/DL (ref 8.7–10.4)
CHLORIDE SERPL-SCNC: 105 MMOL/L (ref 98–112)
CO2 SERPL-SCNC: 23 MMOL/L (ref 21–32)
CREAT BLD-MCNC: 1.32 MG/DL
DEPRECATED RDW RBC AUTO: 51.8 FL (ref 35.1–46.3)
EGFRCR SERPLBLD CKD-EPI 2021: 40 ML/MIN/1.73M2 (ref 60–?)
EOSINOPHIL # BLD AUTO: 0.18 X10(3) UL (ref 0–0.7)
EOSINOPHIL NFR BLD AUTO: 1.2 %
ERYTHROCYTE [DISTWIDTH] IN BLOOD BY AUTOMATED COUNT: 15.7 % (ref 11–15)
GLUCOSE BLD-MCNC: 189 MG/DL (ref 70–99)
GLUCOSE BLDC GLUCOMTR-MCNC: 166 MG/DL (ref 70–99)
GLUCOSE BLDC GLUCOMTR-MCNC: 215 MG/DL (ref 70–99)
GLUCOSE BLDC GLUCOMTR-MCNC: 249 MG/DL (ref 70–99)
GLUCOSE BLDC GLUCOMTR-MCNC: 287 MG/DL (ref 70–99)
HCT VFR BLD AUTO: 27.8 %
HCT VFR BLD AUTO: 32 %
HGB BLD-MCNC: 10 G/DL
HGB BLD-MCNC: 9.3 G/DL
IMM GRANULOCYTES # BLD AUTO: 0.17 X10(3) UL (ref 0–1)
IMM GRANULOCYTES NFR BLD: 1.1 %
LYMPHOCYTES # BLD AUTO: 1.27 X10(3) UL (ref 1–4)
LYMPHOCYTES NFR BLD AUTO: 8.5 %
MAGNESIUM SERPL-MCNC: 1.4 MG/DL (ref 1.6–2.6)
MCH RBC QN AUTO: 29.1 PG (ref 26–34)
MCHC RBC AUTO-ENTMCNC: 31.3 G/DL (ref 31–37)
MCV RBC AUTO: 93 FL
MONOCYTES # BLD AUTO: 1.18 X10(3) UL (ref 0.1–1)
MONOCYTES NFR BLD AUTO: 7.9 %
NEUTROPHILS # BLD AUTO: 12.03 X10 (3) UL (ref 1.5–7.7)
NEUTROPHILS # BLD AUTO: 12.03 X10(3) UL (ref 1.5–7.7)
NEUTROPHILS NFR BLD AUTO: 80.8 %
OSMOLALITY SERPL CALC.SUM OF ELEC: 295 MOSM/KG (ref 275–295)
PLATELET # BLD AUTO: 308 10(3)UL (ref 150–450)
POTASSIUM SERPL-SCNC: 4.2 MMOL/L (ref 3.5–5.1)
RBC # BLD AUTO: 3.44 X10(6)UL
SODIUM SERPL-SCNC: 137 MMOL/L (ref 136–145)
WBC # BLD AUTO: 14.9 X10(3) UL (ref 4–11)

## 2024-03-07 PROCEDURE — 99232 SBSQ HOSP IP/OBS MODERATE 35: CPT | Performed by: PHYSICIAN ASSISTANT

## 2024-03-07 PROCEDURE — 06H03DZ INSERTION OF INTRALUMINAL DEVICE INTO INFERIOR VENA CAVA, PERCUTANEOUS APPROACH: ICD-10-PCS | Performed by: RADIOLOGY

## 2024-03-07 PROCEDURE — 99233 SBSQ HOSP IP/OBS HIGH 50: CPT | Performed by: INTERNAL MEDICINE

## 2024-03-07 PROCEDURE — B5191ZA FLUOROSCOPY OF INFERIOR VENA CAVA USING LOW OSMOLAR CONTRAST, GUIDANCE: ICD-10-PCS | Performed by: RADIOLOGY

## 2024-03-07 RX ORDER — LIDOCAINE HYDROCHLORIDE 20 MG/ML
INJECTION, SOLUTION EPIDURAL; INFILTRATION; INTRACAUDAL; PERINEURAL
Status: COMPLETED
Start: 2024-03-07 | End: 2024-03-07

## 2024-03-07 RX ORDER — MAGNESIUM OXIDE 400 MG/1
800 TABLET ORAL ONCE
Status: COMPLETED | OUTPATIENT
Start: 2024-03-07 | End: 2024-03-07

## 2024-03-07 RX ORDER — INSULIN DEGLUDEC 100 U/ML
15 INJECTION, SOLUTION SUBCUTANEOUS NIGHTLY
Status: DISCONTINUED | OUTPATIENT
Start: 2024-03-07 | End: 2024-03-11

## 2024-03-07 RX ORDER — MIDAZOLAM HYDROCHLORIDE 1 MG/ML
INJECTION INTRAMUSCULAR; INTRAVENOUS
Status: DISCONTINUED
Start: 2024-03-07 | End: 2024-03-07 | Stop reason: WASHOUT

## 2024-03-07 NOTE — PAYOR COMM NOTE
--------------  CONTINUED STAY REVIEW  3/6  Payor: TRINO ARREGUIN [facility]  Subscriber #:  [id number]  Authorization Number: [id number]    Admit date: 2/23/24  Admit time:  6:34 PM    Admitting Physician: Alejandra Palomino MD  Attending Physician:  Heike Sorto MD  Primary Care Physician: Heike Sorto MD    REVIEW DOCUMENTATION:  Extended LOS    3/6    PE (pulmonary thromboembolism) (HCC) from right lower lobe pulmonary artery to multiple segmental arteries on CT 2-23-24.   Without acute cor pulmonale.  Doppler negative for DVT on 2-23-24.   Back on Eliquis.  Monitor hemoglobin.  Not sure of duration whether will be 6 months or longer due to history of breast cancer.       Type 2 diabetes mellitus without retinopathy (HCC)  Sugars are higher.  Off oral hypoglycemics while in hospital.  Sliding scale insulin, hypoglycemic protocol, Accu-Cheks.  Increase Levemir. Monitor.        Hypertension  Generally well-controlled but on the lower limits of normal.  Spironolactone and losartan are being held due to hyperkalemia.  Monitor.       Chronic obstructive pulmonary disease, unspecified (HCC)  Stable.  Nebs as needed.  Pulmonary following patient.       ABLA (acute blood loss anemia)  History of AVMs.  Status post endoscopy.  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 Multiple gastric and duodenal AVMs. She recently underwent cautery of gastric and proximal duodenal avms in two months ago and now multiple new ones have grown. Suspect she will have chronic AVM disease given its natural course. Utility of prophylactic endoscopic treatment is not clear as she will continue to grow new ones. Suggest medical management,.).  GI following patient.  Hemoglobin improved status posttransfusion 1 unit PRBCs.  Monitor back on Eliquis.  On PPI.  Slight drop in hemoglobin noted this morning.  But no evidence of more blood loss.  Has not had any bowel movement.  May be hydration effect?  Will monitor.   Slight drop but last time this happened 1 recheck was normal.  Not sure if some of that lab?  But even more of a decline today than yesterday.  Had a little bit of a nosebleed this morning.  Has not had a bowel movement for 48 hours.  But last bowel movement she now mentions that it was black.  GI reconsulted.  On PPI.  Monitor hemoglobin.  Slight drop. Small amount with epistaxis yetserday and Bm brownish today after no BM X 72 hrs.  GI re-evaluated possible EGD josé miguel.         Elevated white count.  May be due to GI bleed.  But more elevated than yesterday but overall decreased from initial admission.  Check UA.  Denies any respiratory symptoms.  On steroids but stable dose from prior.  Slight worsening.  Check urien and CXR.         Hyperkalemia.  Will hold potassium and spironolactone and losartan.  Low potassium diet.  Will monitor.  Will give some Kayexalate.  Renal to be consulted.  Resolved.        DARWIN.   Not sure if due to dehydration.  Stop diuretics.  Check bladder scan and renal ultrasound and UA.  Avoid nephrotoxins.  Monitor.  Getting light dose of IV fluids.  Was n.p.o. for EGD.  Plus appetite is diminished.  Check postvoid residual.  Last diet load approximately 6 days ago.  Improving.  Avoid nephrotoxins.  Continue to hold diuretics.  No evidence of fluid overload.  Slightly worse today. Will give 1 unit PRBC's today.         UTI.  On IV antibiotics.  Urine culture positive for Klebsiella aerogenes sensitive to ceftriaxone.  Finished IV antibiotics.  OR'ed.         RA.  Stable on prednisone and hydroxychloroquine.  Follows up with rheum as an outpatient.         Hyperlipidemia.  On statin.        Abnormal CT chest.  With bilateral fibrosis and cystic changes.  Pulmonary following patient.         Urinary retention.  Added Flomax.  Straight cath protocol.  Urology consulted.  Patient unwilling to do straight cath and daughter does not want to do.  Explained to patient.  Alonso inserted.  And follow-up  with urology in 1 week.         Hypomagnesemia.  Supplement and monitor.         Dizzy, orthostatic positive  Fall precautions.  Light dose of IV fluids due to history of CHF.  Added midodrine.  Discussed about from laying to sitting wait a couple minutes before standing and also from sitting to standing wait a couple minutes before beginning walking.  Parameters on blood pressure medicines.  And monitor.  Physical therapy did not do stairs due to dizziness. Sill with lightheadedness when gets up.  Will give 1 unit PRBC's.         Nosebleed .  Approx. 1 Tsbp. yetserday  Steriles saline gel.  Monitor. Resolved.      DVT prophylaxis on Eliquis.  GI prophylaxis on PPI.  CODE STATUS full.            GI  EGD with multiple gastric avm's s/p APC and multiple duodenal avms left undisturbed. Resumed on XARELTO 2-28, hgb drifted down to 7.2 this morning and she feels symptomatic.      - consider giving 1 unit prbc today  - anticoagulation will be an ongoing issue in this patient with significant AVM disease. ? any alternative such as IVC filter  - continue octreotide subcutaneous, and PA process for sandostatin LAR  - continue PPI  - will consider repeat EGD with APC tomorrow if hgb continues to decline. However she recently underwent cautery of gastric and proximal duodenal avms in two months ago and now multiple new ones have grown. Suspect she will have chronic AVM disease given its natural course. Utility of prophylactic endoscopic treatment is not clear as she will continue to grow new ones       MEDICATIONS ADMINISTERED IN LAST 1 DAY:  Transfuse RBC       Date Action Dose Route User    3/6/2024 2033 Rate/Dose Change (none) Intravenous Gertrude Bautista RN    3/6/2024 1825 Rate/Dose Change (none) Intravenous Kassandra Quezada RN    3/6/2024 1810 New Bag (none) Intravenous Kassandra Quezada RN          apixaban (Eliquis) tab 10 mg       Date Action Dose Route User    3/6/2024 1336 Given 10 mg Oral Kassandra uQezada RN           carvedilol (Coreg) tab 25 mg       Date Action Dose Route User    3/7/2024 0833 Given 25 mg Oral Kassandra Quezada RN          cetirizine (ZyrTEC) tab 5 mg       Date Action Dose Route User    3/7/2024 0833 Given 5 mg Oral Kassandra Queazda RN          cyclobenzaprine (Flexeril) tab 10 mg       Date Action Dose Route User    3/6/2024 2034 Given 10 mg Oral VelásquezGertrude Liz RN          fluticasone furoate-vilanterol (Breo Ellipta) 100-25 MCG/ACT inhaler 1 puff       Date Action Dose Route User    3/7/2024 0833 Given 1 puff Inhalation Kassandra Quezada RN          furosemide (Lasix) 10 mg/mL injection 20 mg       Date Action Dose Route User    3/6/2024 2053 Given 20 mg Intravenous VelásquezGertrude Liz RN          glycopyrrolate (Robinul) tab 1 mg       Date Action Dose Route User    3/7/2024 0832 Given 1 mg Oral Kassandra Quezada RN    3/6/2024 2034 Given 1 mg Oral Gertrude Bautista RN    3/6/2024 1752 Given 1 mg Oral Kassandra Quezada RN          hydroxychloroquine (Plaquenil) tab 200 mg       Date Action Dose Route User    3/6/2024 2035 Given 200 mg Oral Gertrude Bautista RN          insulin aspart (NovoLOG) 100 Units/mL FlexPen 1-7 Units       Date Action Dose Route User    3/7/2024 0844 Given 3 Units Subcutaneous (Right Lower Abdomen) Kassandra Quezada RN    3/6/2024 1900 Given 1 Units Subcutaneous (Right Upper Arm) Kassandra Quezada RN          insulin degludec 100 units/mL flextouch 12 Units       Date Action Dose Route User    3/6/2024 2056 Given 12 Units Subcutaneous (Left Lower Abdomen) Gertrude Bautista RN          iohexol (Omnipaque) 300 MG/ML injection 100 mL       Date Action Dose Route User    3/7/2024 1010 Given 50 mL Intravenous Winsome Dobbs RN          magnesium oxide (Mag-Ox) tab 800 mg       Date Action Dose Route User    3/7/2024 0833 Given 800 mg Oral Kassandra Quezada RN          midodrine (ProAmatine) tab 2.5 mg       Date Action Dose Route User    3/7/2024 0545 Given 2.5 mg Oral Gertrude Bautista, RN     3/6/2024 1752 Given 2.5 mg Oral Kassandra Quezada RN    3/6/2024 1142 Given 2.5 mg Oral Kassandra Quezada RN          montelukast (Singulair) tab 10 mg       Date Action Dose Route User    3/6/2024 2034 Given 10 mg Oral Velásquez Gertrude RANGEL RN          NIFEdipine ER (Procardia-XL) 24 hr tab 30 mg       Date Action Dose Route User    3/7/2024 0832 Given 30 mg Oral Kassandra Quezada RN          octreoTIDe (SandoSTATIN) 100 mcg/mL injection 100 mcg       Date Action Dose Route User    3/7/2024 0545 Given 100 mcg Subcutaneous (Left Upper Arm) Gertrude Bautista RN    3/6/2024 2059 Given 100 mcg Subcutaneous (Right Lower Abdomen) Gertrude Bautista RN    3/6/2024 1500 Given 100 mcg Subcutaneous (Right Upper Arm) Kassandra Quezada RN          pantoprazole (Protonix) DR tab 40 mg       Date Action Dose Route User    3/7/2024 0545 Given 40 mg Oral Velásquez Gertrude RANGEL RN    3/6/2024 1752 Given 40 mg Oral Kassandra Quezada RN          predniSONE (Deltasone) tab 5 mg       Date Action Dose Route User    3/7/2024 0833 Given 5 mg Oral Kassandra Quezada RN          rosuvastatin (Crestor) tab 10 mg       Date Action Dose Route User    3/6/2024 2035 Given 10 mg Oral VelásquezGertrude Liz RN          sodium chloride 0.9% infusion       Date Action Dose Route User    3/6/2024 1814 New Bag (none) Intravenous Kassandra Quezada RN          tamsulosin (Flomax) cap 0.4 mg       Date Action Dose Route User    3/7/2024 0833 Given 0.4 mg Oral Kassandra Quezada RN          vancomycin (Vancocin) cap 125 mg       Date Action Dose Route User    3/7/2024 0832 Given 125 mg Oral Kassandra Quezada RN          iron sucrose (Venofer) 300 mg in sodium chloride 0.9% 250 mL IVPB       Date Action Dose Route User    3/7/2024 0833 New Bag 300 mg Intravenous Kassandra Quezada RN    3/6/2024 1142 New Bag 300 mg Intravenous Kassandra Quezada RN            Vitals (last day)       Date/Time Temp Pulse Resp BP SpO2 Weight O2 Device O2 Flow Rate (L/min) [facility]    03/07/24 1133 97.9 °F (36.6  °C) 98 18 134/62 97 % -- None (Room air) --     03/07/24 1027 98.7 °F (37.1 °C) 102 20 129/65 93 % -- None (Room air) --     03/07/24 0828 98.6 °F (37 °C) 111 21 155/68 100 % -- None (Room air) --     03/07/24 0700 -- 94 -- -- -- -- -- --     03/07/24 0542 -- 127 20 128/74 92 % -- None (Room air) --     03/07/24 0120 99.2 °F (37.3 °C) 117 20 150/72 92 % -- None (Room air) --     03/07/24 0118 -- -- -- 136/80 -- -- -- --     03/07/24 0117 -- -- -- 132/69 -- -- -- --     03/06/24 2045 98.3 °F (36.8 °C) 99 20 145/77 94 % -- None (Room air) -- EG    03/06/24 2033 98.4 °F (36.9 °C) 100 20 146/71 93 % -- None (Room air) --     03/06/24 1923 98.2 °F (36.8 °C) 101 20 134/60 92 % -- None (Room air) --     03/06/24 1900 -- 98 -- -- -- -- -- --     03/06/24 1825 98.5 °F (36.9 °C) 96 20 113/63 92 % -- -- --     03/06/24 1749 98.6 °F (37 °C) 98 20 125/61 93 % -- None (Room air) --     03/06/24 1139 98.6 °F (37 °C) 96 20 123/61 95 % -- None (Room air) --     03/06/24 0858 98.4 °F (36.9 °C) 92 18 108/78 96 % -- None (Room air) --     03/06/24 0617 97.6 °F (36.4 °C) 100 20 113/51 95 % -- None (Room air) -- NM    03/06/24 0100 97.5 °F (36.4 °C) 95 20 126/57 97 % -- None (Room air) -- NM               Blood Transfusion Record       Product Unit Status Volume Start End            Transfuse RBC       24  [id number]  [id number] Stopped 259.58 mL 03/06/24 1810 03/06/24 2044       24  [id number]  [id number] Completed 02/26/24 1439 350 mL 02/26/24 1151 02/26/24 1439

## 2024-03-07 NOTE — PROGRESS NOTES
[facility]  part of [facility]    Progress Note    Chester Bautista Patient Status:  Inpatient    1940 MRN [id number]   Location [facility]5W Attending Heike Sorto MD   Hosp Day # 13 PCP Heike Sorto MD     Subjective:   Seen and examined while resting in bed. S/p IVC filter placement. Complains of dizziness and dyspnea on exertion. No shortness of breath at rest or chest pain. On room air.    Objective:   Blood pressure 134/62, pulse 98, temperature 97.9 °F (36.6 °C), temperature source Oral, resp. rate 18, height 5' 1\" (1.549 m), weight 129 lb 6.4 oz (58.7 kg), SpO2 97%.  Physical Exam  Vitals and nursing note reviewed.   Constitutional:       General: She is awake. She is not in acute distress.     Appearance: She is underweight. She is ill-appearing.   HENT:      Head: Normocephalic and atraumatic.   Cardiovascular:      Rate and Rhythm: Normal rate and regular rhythm.   Pulmonary:      Effort: No respiratory distress.      Breath sounds: No wheezing, rhonchi or rales.      Comments: Diminished breath sounds bilaterally  Abdominal:      Palpations: Abdomen is soft.      Tenderness: There is no abdominal tenderness.   Musculoskeletal:      Cervical back: Normal range of motion and neck supple.      Right lower leg: No edema.      Left lower leg: No edema.   Skin:     General: Skin is warm and dry.   Neurological:      General: No focal deficit present.      Mental Status: She is alert and oriented to person, place, and time.   Psychiatric:         Mood and Affect: Mood normal.         Behavior: Behavior is cooperative.       Results:   Lab Results   Component Value Date    WBC 14.9 (H) 2024    HGB 9.3 (L) 2024    HCT 27.8 (L) 2024    .0 2024    CREATSERUM 1.32 (H) 2024    BUN 30 (H) 2024     2024    K 4.2 2024     2024    CO2 23.0 2024     (H) 2024    CA 9.3  03/07/2024    ALB 3.4 02/24/2024    ALKPHO 63 02/24/2024    BILT 0.2 02/24/2024    TP 6.4 02/24/2024    AST 18 02/24/2024    ALT <7 (L) 02/24/2024    PTT 69.9 (H) 02/28/2024    INR 0.93 12/14/2023    TSH 0.622 10/20/2023    DDIMER 1.60 (H) 02/23/2024    ESRML 91 (H) 09/14/2023    CRP 1.10 (H) 09/14/2023    MG 1.4 (L) 03/07/2024    TROPHS 9 02/23/2024    B12 748 10/20/2023       XR CHEST PA + LAT CHEST (CPT=71046)    Result Date: 3/6/2024  CONCLUSION:  1. Emphysema with linear scarring and or atelectasis in the mid and upper lungs.  No evidence of pneumonia. 2. Right Port-A-Cath with distal 2 cm kink back on itself and the tip pointing cephalad.     Dictated by (CST): Michael Gonzalez MD on 3/06/2024 at 4:20 PM     Finalized by (CST): Michael Gonzalez MD on 3/06/2024 at 4:25 PM               Assessment & Plan:   Acute pulmonary embolism  CTA chest with RLL PE, low clot burden and no major central obstruction  No CT evidence of RH strain  LE Doppler US no DVT  Recent hospitalization may be main risk factor  History of breast cancer  C/b recurrent GI bleeding with AVMs and worsening symptomatic anemia  S/p IVC filter 3/7  Hemodynamically stable and no hypoxemia  Plan:  -Stop Eliquis; may consider resuming at reduced dose in future  -SCDs     Acute on chronic anemia  GI bleeding  Recurrent GI bleeding with AVMs  Worse in setting of anticoagulation initiation  S/p EGD with cauterization of multiple AVMs  Hemoglobin downtrending again 3/6 - s/p 1 unit pRBCs  Plan:  -Follow hemoglobin  -IV Venofer  -PPI and octreotide as per GI    Abnormal CT chest  History of scarring, bronchiectasis, pneumatoceles, recent pneumonia, and question of prior history of sarcoidosis  Plan:  -Follow-up CT chest in 6-8 weeks    D/w Dr. Parker, primary, and RN.    Mendoza Brewer PA-C  Pulmonary Medicine  3/7/2024

## 2024-03-07 NOTE — PLAN OF CARE

## 2024-03-07 NOTE — IVS NOTE
Inpatient Throughput Communication:    Called inpatient RN Polly and notified of scheduled procedure.     Verified that appropriate consent is signed: Yes  Appropriate Consent Signed: Yes  Access Site Hair Clipped and skin prepped: Yes  Patient has functional IV site: Yes  Patient received all pre-treatment medications: Not Applicable  Family aware of approximate time of procedure: Not Applicable    Eula RANGEL RN, RN

## 2024-03-07 NOTE — PROCEDURES
[facility]  part of [facility]  Procedure Note    Chester Bautista Patient Status:  Inpatient    1940 MRN [id number]   Location [facility] INTERVENTIONAL SUITES Attending Heike Sorto MD   Hosp Day # 13 PCP Heike Sorto MD     Procedure: IVC filter insertion    Pre-Procedure Diagnosis: Other pulmonary embolism without acute cor pulmonale, unspecified chronicity (HCC) [I26.99]      Post-Procedure Diagnosis: Other pulmonary embolism without acute cor pulmonale, unspecified chronicity (HCC) [I26.99]    Anesthesia:  Local    Findings:  Right internal jugular vein accessed under ultrasound.  IVC-gram reveals absence of thrombus.  Argon Elite filter placed in infrarenal position.    Specimens: None    Blood Loss:  5mL      Complications:  None    Plan:  Retrieve IVC filter in 3-6 months.    JENN HURD MD  3/7/2024

## 2024-03-07 NOTE — CM/SW NOTE
RACHAEL followed up on discharge planning.    RACHAEL spoke to Lucero from [facility].  A prior auth is required for Octreotide.    SW initiated urgent prior auth via Cover my meds.    Key: [id number]    Update 1:30 PM    PA approved.  RACHAEL spoke to Wade from [facility]- pt co-pay is $100.  When pt is ready to discharge, SW to ask pt if she prefers medication shipped to home or [facility] pharmacy.    PLAN: DC home w/[facility] pending med clear    / to remain available for support and/or discharge planning.     Heike Guerrier MSW, LSW [id number]

## 2024-03-07 NOTE — TELEPHONE ENCOUNTER
PA for Ocreotide is approved only for 30 day supply.     Your request has been denied  The drug above is APPROVED for/through 12/31/2024 for up to a 30-day supply by [employer] Coverage Policy. You asked for the drug above for a 365 day supply. Page 5 of your Prescription Drug Guide (PDG) says that some drugs are limited to a 30-day supply. Due to this drugs cost and/or tier, your plan only allows up to a 30-day supply of this drug per fill. The 30-day supply limit on certain drugs can also apply to mail-order. You can view your PDG and see a full list of in-network pharmacies by visiting us online or call the number on the back of your card.    Per inpt note today,   RACHAEL followed up on discharge planning.    RACHAEL spoke to Lucero from [facility].  A prior auth is required for Octreotide.     RACHAEL initiated urgent prior auth via Cover my meds.     Key: [id number]     Update 1:30 PM     PA approved.  RACHAEL spoke to Wade from [facility]- pt co-pay is $100.  When pt is ready to discharge, RACHAEL to ask pt if she prefers medication shipped to home or [facility] pharmacy.     Pt is still admitted Bed 521.

## 2024-03-07 NOTE — PROGRESS NOTES
[facility]  part of [facility]    Progress Note    Chester Bautista Patient Status:  Inpatient    1940 MRN [id number]   Location [facility]5W Attending Heike Sorto MD   Hosp Day # 13 PCP Heike Sorto MD     Chief Complaint: GIB.     Subjective:     Constitutional:  Positive for activity change and fatigue. Negative for appetite change, chills, diaphoresis, fever and unexpected weight change.   HENT:  Negative for nosebleeds and trouble swallowing. Mouth sores: This AM..   Respiratory:  Negative for apnea, cough, choking, chest tightness, shortness of breath, wheezing and stridor.    Cardiovascular:  Negative for chest pain, palpitations and leg swelling.   Gastrointestinal:  Positive for diarrhea (3 BM but soft.). Negative for heartburn, nausea, vomiting, abdominal pain, constipation (Last BM> 48 hrs. Not eatting much due to NPO.), blood in stool, abdominal distention, anal bleeding and rectal pain.        2 Bm this am and brownish nonbloody.    Endocrine: Negative for cold intolerance, heat intolerance, polydipsia, polyphagia and polyuria.   Genitourinary:  Negative for bladder incontinence, dysuria, urgency, frequency, hematuria, flank pain and difficulty urinating.   Musculoskeletal:  Myalgias: Radiates up when lays head down on bed. Pillow at home is different. Had Ct done because was worse..   Neurological:  Positive for dizziness, weakness and light-headedness. Negative for tremors, seizures, syncope, facial asymmetry, speech difficulty, numbness and headaches.        Per patient she usually gets lightheaded at home.  She is careful from laying to sitting she waits a couple minutes.     Psychiatric/Behavioral:  Negative for suicidal ideas, hallucinations, behavioral problems, confusion, sleep disturbance, self-injury, decreased concentration, agitation and depressed mood. The patient is not nervous/anxious and is not hyperactive.    All other systems  reviewed and are negative.      Objective:   Blood pressure 155/68, pulse 111, temperature 98.6 °F (37 °C), temperature source Oral, resp. rate 21, height 5' 1\" (1.549 m), weight 129 lb 6.4 oz (58.7 kg), SpO2 100%.  Physical Exam  Vitals and nursing note reviewed.   Constitutional:       General: She is not in acute distress.     Appearance: Normal appearance. She is well-groomed. She is not ill-appearing, toxic-appearing or diaphoretic.      Interventions: She is not intubated.  Eyes:      General: No scleral icterus.  Cardiovascular:      Rate and Rhythm: Normal rate and regular rhythm.      Pulses: Normal pulses.   Pulmonary:      Effort: No tachypnea, bradypnea, accessory muscle usage, prolonged expiration, respiratory distress or retractions. She is not intubated.      Breath sounds: No stridor, decreased air movement or transmitted upper airway sounds. No decreased breath sounds, wheezing, rhonchi or rales.   Chest:      Chest wall: No tenderness.   Abdominal:      General: Bowel sounds are normal. There is no distension.      Palpations: Abdomen is soft.      Tenderness: There is no abdominal tenderness. There is no right CVA tenderness, left CVA tenderness, guarding or rebound. Negative signs include Hung's sign.   Musculoskeletal:      Right lower leg: No edema.      Left lower leg: No edema.   Neurological:      Mental Status: She is alert and oriented to person, place, and time.   Psychiatric:         Mood and Affect: Mood normal.         Behavior: Behavior is cooperative.         Results:   Lab Results   Component Value Date    WBC 14.9 (H) 03/07/2024    HGB 10.0 (L) 03/07/2024    HCT 32.0 (L) 03/07/2024    .0 03/07/2024    CREATSERUM 1.32 (H) 03/07/2024    BUN 30 (H) 03/07/2024     03/07/2024    K 4.2 03/07/2024     03/07/2024    CO2 23.0 03/07/2024     (H) 03/07/2024    CA 9.3 03/07/2024    ALB 3.4 02/24/2024    ALKPHO 63 02/24/2024    BILT 0.2 02/24/2024    TP 6.4  02/24/2024    AST 18 02/24/2024    ALT <7 (L) 02/24/2024    PTT 69.9 (H) 02/28/2024    INR 0.93 12/14/2023    TSH 0.622 10/20/2023    DDIMER 1.60 (H) 02/23/2024    ESRML 91 (H) 09/14/2023    CRP 1.10 (H) 09/14/2023    MG 1.4 (L) 03/07/2024    TROPHS 9 02/23/2024    B12 748 10/20/2023       XR CHEST PA + LAT CHEST (CPT=71046)    Result Date: 3/6/2024  CONCLUSION:  1. Emphysema with linear scarring and or atelectasis in the mid and upper lungs.  No evidence of pneumonia. 2. Right Port-A-Cath with distal 2 cm kink back on itself and the tip pointing cephalad.     Dictated by (CST): Michael Gonzalez MD on 3/06/2024 at 4:20 PM     Finalized by (CST): Michael Gonzalez MD on 3/06/2024 at 4:25 PM               Assessment & Plan:     PE (pulmonary thromboembolism) (HCC) from right lower lobe pulmonary artery to multiple segmental arteries on CT 2-23-24.   CT without evidence of RV strain.  Without acute cor pulmonale.  Doppler negative for DVT on 2-23-24.   Back on Eliquis.  Monitor hemoglobin.  Not sure of duration whether will be 6 months or longer due to history of breast cancer.  Today discussed with pulmonary.  Discussed with pulmonary critical care.  For IVC filter today.  Hold Eliquis.      Type 2 diabetes mellitus without retinopathy (HCC)  Sugars are higher.  Off oral hypoglycemics while in hospital.  Sliding scale insulin, hypoglycemic protocol, Accu-Cheks.  Increase Levemir. Monitor.       Hypertension  Generally well-controlled but on the lower limits of normal.  Spironolactone and losartan are being held due to hyperkalemia.  Monitor.      Chronic obstructive pulmonary disease, unspecified (HCC)  Stable.  Nebs as needed.  Pulmonary following patient.      ABLA (acute blood loss anemia)  History of AVMs.  Status post endoscopy.  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 Multiple gastric and duodenal AVMs. She recently underwent cautery of gastric and proximal duodenal avms in two  months ago and now multiple new ones have grown. Suspect she will have chronic AVM disease given its natural course. Utility of prophylactic endoscopic treatment is not clear as she will continue to grow new ones. Suggest medical management,.).  GI following patient.  Hemoglobin improved status posttransfusion 1 unit PRBCs.  Monitor back on Eliquis.  On PPI.  Slight drop in hemoglobin noted this morning.  But no evidence of more blood loss.  Has not had any bowel movement.  May be hydration effect?  Will monitor.  Slight drop but last time this happened 1 recheck was normal.  Not sure if some of that lab?  But even more of a decline today than yesterday.  Had a little bit of a nosebleed this morning.  Has not had a bowel movement for 48 hours.  But last bowel movement she now mentions that it was black.  GI reconsulted.  On PPI.  Monitor hemoglobin.  Slight drop. Small amount with epistaxis yetserday and Bm brownish today after no BM X 72 hrs.  GI re-evaluated possible EGD josé miguel.        Elevated white count.  May be due to GI bleed.  But more elevated than yesterday but overall decreased from initial admission.  Check UA.  Denies any respiratory symptoms.  On steroids but stable dose from prior.  Slight worsening.  Check urien and CXR.        Hyperkalemia.  Will hold potassium and spironolactone and losartan.  Low potassium diet.  Will monitor.  Will give some Kayexalate.  Renal to be consulted.  Resolved.       DARWIN.   Not sure if due to dehydration.  Stop diuretics.  Check bladder scan and renal ultrasound and UA.  Avoid nephrotoxins.  Monitor.  Getting light dose of IV fluids.  Was n.p.o. for EGD.  Plus appetite is diminished.  Check postvoid residual.  Last diet load approximately 6 days ago.  Improving.  Avoid nephrotoxins.  Continue to hold diuretics.  No evidence of fluid overload.  Slightly improved today.        UTI.  On IV antibiotics.  Urine culture positive for Klebsiella aerogenes sensitive to  ceftriaxone.  Finished IV antibiotics.  DC'ed.        RA.  Stable on prednisone and hydroxychloroquine.  Follows up with rheum as an outpatient.        Hyperlipidemia.  On statin.       Abnormal CT chest.  With bilateral fibrosis and cystic changes.  Pulmonary following patient.        Urinary retention.  Added Flomax.  Straight cath protocol.  Urology consulted.  Patient unwilling to do straight cath and daughter does not want to do.  Explained to patient.  Alonso inserted.  And follow-up with urology in 1 week.        Hypomagnesemia.  Supplement and monitor.        Dizzy, orthostatic positive  Fall precautions.  Light dose of IV fluids due to history of CHF.  Added midodrine.  Discussed about from laying to sitting wait a couple minutes before standing and also from sitting to standing wait a couple minutes before beginning walking.  Parameters on blood pressure medicines.  And monitor.  Physical therapy did not do stairs due to dizziness. Sill with lightheadedness when gets up.  S/P 1 unit PRBC's.        Nosebleed .  Approx. 1 Tsbp. yetserday  Steriles saline gel.  Monitor. Resolved.     DVT prophylaxis on Eliquis.  GI prophylaxis on PPI.  CODE STATUS full.  Discussed with patient.  DW daughter Brea diallo in room with pt. permission.   MAGGIE RN taking care of pt.      **Certification      PHYSICIAN Certification of Need for Inpatient Hospitalization - Initial Certification    Patient will require inpatient services that will reasonably be expected to span two midnight's based on the clinical documentation in H+P.   Based on patients current state of illness, I anticipate that, after discharge, patient will require TBD.    Heike Sorto MD  3/7/2024

## 2024-03-08 ENCOUNTER — APPOINTMENT (OUTPATIENT)
Dept: HEMATOLOGY/ONCOLOGY | Facility: HOSPITAL | Age: 84
End: 2024-03-08
Attending: INTERNAL MEDICINE
Payer: MEDICARE

## 2024-03-08 PROBLEM — I26.99 ACUTE PULMONARY EMBOLISM (HCC): Status: ACTIVE | Noted: 2024-02-23

## 2024-03-08 LAB
ANION GAP SERPL CALC-SCNC: 5 MMOL/L (ref 0–18)
BASOPHILS # BLD AUTO: 0.05 X10(3) UL (ref 0–0.2)
BASOPHILS NFR BLD AUTO: 0.3 %
BLOOD TYPE BARCODE: 7300
BUN BLD-MCNC: 25 MG/DL (ref 9–23)
BUN/CREAT SERPL: 24 (ref 10–20)
C DIFF TOX B STL QL: NEGATIVE
CALCIUM BLD-MCNC: 8.3 MG/DL (ref 8.7–10.4)
CHLORIDE SERPL-SCNC: 109 MMOL/L (ref 98–112)
CO2 SERPL-SCNC: 24 MMOL/L (ref 21–32)
CREAT BLD-MCNC: 1.04 MG/DL
DEPRECATED RDW RBC AUTO: 50.7 FL (ref 35.1–46.3)
EGFRCR SERPLBLD CKD-EPI 2021: 53 ML/MIN/1.73M2 (ref 60–?)
EOSINOPHIL # BLD AUTO: 0.17 X10(3) UL (ref 0–0.7)
EOSINOPHIL NFR BLD AUTO: 1.1 %
ERYTHROCYTE [DISTWIDTH] IN BLOOD BY AUTOMATED COUNT: 15.6 % (ref 11–15)
GLUCOSE BLD-MCNC: 159 MG/DL (ref 70–99)
GLUCOSE BLDC GLUCOMTR-MCNC: 161 MG/DL (ref 70–99)
GLUCOSE BLDC GLUCOMTR-MCNC: 173 MG/DL (ref 70–99)
GLUCOSE BLDC GLUCOMTR-MCNC: 190 MG/DL (ref 70–99)
GLUCOSE BLDC GLUCOMTR-MCNC: 192 MG/DL (ref 70–99)
HCT VFR BLD AUTO: 25.9 %
HCT VFR BLD AUTO: 26.7 %
HGB BLD-MCNC: 8.2 G/DL
HGB BLD-MCNC: 8.4 G/DL
IMM GRANULOCYTES # BLD AUTO: 0.35 X10(3) UL (ref 0–1)
IMM GRANULOCYTES NFR BLD: 2.2 %
LYMPHOCYTES # BLD AUTO: 1.43 X10(3) UL (ref 1–4)
LYMPHOCYTES NFR BLD AUTO: 8.9 %
MAGNESIUM SERPL-MCNC: 1.5 MG/DL (ref 1.6–2.6)
MCH RBC QN AUTO: 29.2 PG (ref 26–34)
MCHC RBC AUTO-ENTMCNC: 31.7 G/DL (ref 31–37)
MCV RBC AUTO: 92.2 FL
MONOCYTES # BLD AUTO: 1.49 X10(3) UL (ref 0.1–1)
MONOCYTES NFR BLD AUTO: 9.3 %
NEUTROPHILS # BLD AUTO: 12.59 X10 (3) UL (ref 1.5–7.7)
NEUTROPHILS # BLD AUTO: 12.59 X10(3) UL (ref 1.5–7.7)
NEUTROPHILS NFR BLD AUTO: 78.2 %
OSMOLALITY SERPL CALC.SUM OF ELEC: 294 MOSM/KG (ref 275–295)
PLATELET # BLD AUTO: 276 10(3)UL (ref 150–450)
POTASSIUM SERPL-SCNC: 4.1 MMOL/L (ref 3.5–5.1)
RBC # BLD AUTO: 2.81 X10(6)UL
SODIUM SERPL-SCNC: 138 MMOL/L (ref 136–145)
UNIT VOLUME: 350 ML
WBC # BLD AUTO: 16.1 X10(3) UL (ref 4–11)

## 2024-03-08 PROCEDURE — 99232 SBSQ HOSP IP/OBS MODERATE 35: CPT | Performed by: INTERNAL MEDICINE

## 2024-03-08 PROCEDURE — 99233 SBSQ HOSP IP/OBS HIGH 50: CPT | Performed by: INTERNAL MEDICINE

## 2024-03-08 RX ORDER — MAGNESIUM OXIDE 400 MG/1
800 TABLET ORAL ONCE
Status: COMPLETED | OUTPATIENT
Start: 2024-03-08 | End: 2024-03-08

## 2024-03-08 RX ORDER — CARVEDILOL 12.5 MG/1
12.5 TABLET ORAL 2 TIMES DAILY WITH MEALS
Status: DISCONTINUED | OUTPATIENT
Start: 2024-03-08 | End: 2024-03-13

## 2024-03-08 NOTE — PLAN OF CARE

## 2024-03-08 NOTE — PROGRESS NOTES
[facility]  part of [facility]    Progress Note    Chester Bautista Patient Status:  Inpatient    1940 MRN [id number]   Location [facility]5W Attending Heike Sorto MD   Hosp Day # 14 PCP Heike Sorto MD     Chief Complaint: GIB.     Subjective:     Constitutional:  Positive for activity change and fatigue. Negative for appetite change, chills, diaphoresis, fever and unexpected weight change.   HENT:  Negative for nosebleeds and trouble swallowing. Mouth sores: This AM..   Respiratory:  Negative for apnea, cough, choking, chest tightness, shortness of breath, wheezing and stridor.    Cardiovascular:  Negative for chest pain, palpitations and leg swelling.   Gastrointestinal:  Positive for diarrhea (3 BM but soft.). Negative for heartburn, nausea, vomiting, abdominal pain, constipation (Last BM> 48 hrs. Not eatting much due to NPO.), blood in stool, abdominal distention, anal bleeding and rectal pain.        2 Bm this am and brownish nonbloody.    Endocrine: Negative for cold intolerance, heat intolerance, polydipsia, polyphagia and polyuria.   Genitourinary:  Negative for bladder incontinence, dysuria, urgency, frequency, hematuria, flank pain and difficulty urinating.   Musculoskeletal:  Myalgias: Radiates up when lays head down on bed. Pillow at home is different. Had Ct done because was worse..   Neurological:  Positive for dizziness, weakness and light-headedness. Negative for tremors, seizures, syncope, facial asymmetry, speech difficulty, numbness and headaches.        Per patient she usually gets lightheaded at home.  She is careful from laying to sitting she waits a couple minutes.     Psychiatric/Behavioral:  Negative for suicidal ideas, hallucinations, behavioral problems, confusion, sleep disturbance, self-injury, decreased concentration, agitation and depressed mood. The patient is not nervous/anxious and is not hyperactive.    All other systems  reviewed and are negative.      Objective:   Blood pressure 115/58, pulse 97, temperature 98.8 °F (37.1 °C), temperature source Oral, resp. rate 16, height 5' 1\" (1.549 m), weight 129 lb 6.4 oz (58.7 kg), SpO2 100%.  Physical Exam  Vitals and nursing note reviewed.   Constitutional:       General: She is not in acute distress.     Appearance: Normal appearance. She is well-groomed. She is not ill-appearing, toxic-appearing or diaphoretic.      Interventions: She is not intubated.  Eyes:      General: No scleral icterus.  Cardiovascular:      Rate and Rhythm: Normal rate and regular rhythm.      Pulses: Normal pulses.   Pulmonary:      Effort: No tachypnea, bradypnea, accessory muscle usage, prolonged expiration, respiratory distress or retractions. She is not intubated.      Breath sounds: No stridor, decreased air movement or transmitted upper airway sounds. No decreased breath sounds, wheezing, rhonchi or rales.   Chest:      Chest wall: No tenderness.   Abdominal:      General: Bowel sounds are normal. There is no distension.      Palpations: Abdomen is soft.      Tenderness: There is no abdominal tenderness. There is no right CVA tenderness, left CVA tenderness, guarding or rebound. Negative signs include Hung's sign.   Musculoskeletal:      Right lower leg: No edema.      Left lower leg: No edema.   Neurological:      Mental Status: She is alert and oriented to person, place, and time.   Psychiatric:         Mood and Affect: Mood normal.         Behavior: Behavior is cooperative.         Results:   Lab Results   Component Value Date    WBC 16.1 (H) 03/08/2024    HGB 8.2 (L) 03/08/2024    HCT 25.9 (L) 03/08/2024    .0 03/08/2024    CREATSERUM 1.04 (H) 03/08/2024    BUN 25 (H) 03/08/2024     03/08/2024    K 4.1 03/08/2024     03/08/2024    CO2 24.0 03/08/2024     (H) 03/08/2024    CA 8.3 (L) 03/08/2024    ALB 3.4 02/24/2024    ALKPHO 63 02/24/2024    BILT 0.2 02/24/2024    TP 6.4  02/24/2024    AST 18 02/24/2024    ALT <7 (L) 02/24/2024    PTT 69.9 (H) 02/28/2024    INR 0.93 12/14/2023    TSH 0.622 10/20/2023    DDIMER 1.60 (H) 02/23/2024    ESRML 91 (H) 09/14/2023    CRP 1.10 (H) 09/14/2023    MG 1.5 (L) 03/08/2024    TROPHS 9 02/23/2024    B12 748 10/20/2023       XR CHEST PA + LAT CHEST (CPT=71046)    Result Date: 3/6/2024  CONCLUSION:  1. Emphysema with linear scarring and or atelectasis in the mid and upper lungs.  No evidence of pneumonia. 2. Right Port-A-Cath with distal 2 cm kink back on itself and the tip pointing cephalad.     Dictated by (CST): Michael Gonzalez MD on 3/06/2024 at 4:20 PM     Finalized by (CST): Michael Gonzalez MD on 3/06/2024 at 4:25 PM               Assessment & Plan:     PE (pulmonary thromboembolism) (HCC) from right lower lobe pulmonary artery to multiple segmental arteries on CT 2-23-24.   CT without evidence of RV strain.  Without acute cor pulmonale.  Doppler negative for DVT on 2-23-24.   Not sure if due to hospitalization from prior.  Did have SCDs on from prior hospitalization.  Not sure of duration whether will be 6 months or longer due to history of breast cancer.  Discussed with pulmonary critical care.  Status post IVC filter March 7, 2024.  Hold Eliquis.      Type 2 diabetes mellitus without retinopathy (HCC)  Sugars are higher.  Off oral hypoglycemics while in hospital.  Sliding scale insulin, hypoglycemic protocol, Accu-Cheks.  Increase Levemir. Monitor.       Hypertension  Generally well-controlled but on the lower limits of normal.  Spironolactone and losartan are being held due to hyperkalemia.  Stop nifedipine.  Will decrease Coreg.  Monitor.  Added midodrine.      Chronic obstructive pulmonary disease, unspecified (HCC)  Stable.  Nebs as needed.  Pulmonary following patient.      ABLA (acute blood loss anemia)  History of AVMs.  Status post endoscopy.  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 Multiple gastric  and duodenal AVMs. She recently underwent cautery of gastric and proximal duodenal avms in two months ago and now multiple new ones have grown. Suspect she will have chronic AVM disease given its natural course. Utility of prophylactic endoscopic treatment is not clear as she will continue to grow new ones. Suggest medical management,.).  GI following patient.  Hemoglobin improved status posttransfusion 1 unit PRBCs.  Monitor back on Eliquis.  On PPI.  Slight drop in hemoglobin noted this morning.  But no evidence of more blood loss.  Has not had any bowel movement.  May be hydration effect?  Will monitor.  Slight drop but last time this happened 1 recheck was normal.  Not sure if some of that lab?  But even more of a decline today than yesterday.  Had a little bit of a nosebleed this morning.  Has not had a bowel movement for 48 hours.  But last bowel movement she now mentions that it was black.  GI reconsulted.  On PPI.  Monitor hemoglobin.  Slight drop. Small amount with epistaxis yetserday and Bm brownish today after no BM X 72 hrs. GI reconsulted.  Status post IVC filter yesterday March 7, 2024.  Slight drop today also in hemoglobin.  Will monitor.       Elevated white count.  May be due to GI bleed.  But more elevated than yesterday but overall decreased from initial admission.  Check UA.  Denies any respiratory symptoms.  On steroids but stable dose from prior.  Slight worsening.  Urine culture negative to date.  Chest x-ray shows no acute disease.  But now with diarrhea.  Will check C. difficile.  D if ue to acute bleed?       Hyperkalemia.  Will hold potassium and spironolactone and losartan.  Low potassium diet.  Will monitor.  Will give some Kayexalate.  Renal to be consulted.  Resolved.       DARWIN.   Not sure if due to dehydration.  Stop diuretics.  Check bladder scan and renal ultrasound and UA.  Avoid nephrotoxins.  Monitor.  Getting light dose of IV fluids.  Was n.p.o. for EGD.  Plus appetite is  diminished.  Check postvoid residual.  Last diet load approximately 6 days ago.  Improving.  Avoid nephrotoxins.  Continue to hold diuretics.  No evidence of fluid overload.  Slightly improved today.        UTI.  On IV antibiotics.  Urine culture positive for Klebsiella aerogenes sensitive to ceftriaxone.  Finished IV antibiotics.  OK'ed.        RA.  Stable on prednisone and hydroxychloroquine.  Follows up with rheum as an outpatient.        Hyperlipidemia.  On statin.       Abnormal CT chest.  With bilateral fibrosis and cystic changes.  Pulmonary following patient.        Urinary retention.  Added Flomax.  Straight cath protocol.  Urology consulted.  Patient unwilling to do straight cath and daughter does not want to do.  Explained to patient.  Alonso inserted.  And follow-up with urology in 1 week.        Hypomagnesemia.  Supplement and monitor.        Dizzy, orthostatic positive  Fall precautions.  Light dose of IV fluids due to history of CHF.  Added midodrine.  Discussed about from laying to sitting wait a couple minutes before standing and also from sitting to standing wait a couple minutes before beginning walking.  Parameters on blood pressure medicines.  And monitor.  Physical therapy did not do stairs due to dizziness. Sill with lightheadedness when gets up.  S/P 1 unit PRBC's.  Stopped nifedipine completely.  Will decrease Coreg.  Monitor.       Nosebleed .  Approx. 1 Tsbp. yetserday  Steriles saline gel.  Monitor. Resolved.        Diarrhea.  MiraLAX was stopped 48 hours ago.  No other agents to cause diarrhea.  Will check C. difficile due to continued elevation white count.    DVT prophylaxis off Eliquis due to GI bleed.  On SCDs.  Has IVC filter.  GI prophylaxis on PPI.  CODE STATUS full.  Discussed with patient.  MAGGIE daughter Brea diallo in room with pt. permission.   MAGGIE RN taking care of pt.      **Certification      PHYSICIAN Certification of Need for Inpatient Hospitalization - Initial  Certification    Patient will require inpatient services that will reasonably be expected to span two midnight's based on the clinical documentation in H+P.   Based on patients current state of illness, I anticipate that, after discharge, patient will require TBD.    Heike Sorto MD  3/8/2024

## 2024-03-08 NOTE — TELEPHONE ENCOUNTER
Approved Ocreotide PA is for 100 mcg subcutaneous.    Approval date is good til 12/31/24 per approval letter received.

## 2024-03-08 NOTE — PROGRESS NOTES
[facility]  part of [facility]     Progress Note    Chester Bautista Patient Status:  Inpatient    1940 MRN [id number]   Location [facility]5W Attending Heike Sorto MD   Hosp Day # 14 PCP Heike Sorto MD       Subjective:   Patient seen and examined.  Denies significant dyspnea symptoms or GI bleeding.    Objective:   Blood pressure 113/51, pulse 85, temperature 97 °F (36.1 °C), temperature source Oral, resp. rate 14, height 5' 1\" (1.549 m), weight 129 lb 6.4 oz (58.7 kg), SpO2 94%.  Intake/Output:   Last 3 shifts: I/O last 3 completed shifts:  In: 539.6 [P.O.:240; I.V.:40; Blood:259.6]  Out: 1450 [Urine:1450]   This shift: No intake/output data recorded.     Vent Settings:      Hemodynamic parameters (last 24 hours):      Scheduled Meds:   Current Facility-Administered Medications   Medication Dose Route Frequency    carvedilol (Coreg) tab 12.5 mg  12.5 mg Oral BID with meals    insulin degludec 100 units/mL flextouch 15 Units  15 Units Subcutaneous Nightly    polyethylene glycol (PEG 3350) (Miralax) 17 g oral packet 17 g  17 g Oral Daily PRN    midodrine (ProAmatine) tab 2.5 mg  2.5 mg Oral TID    saline (Ayr Saline) nasal gel   Nasal PRN    vancomycin (Vancocin) cap 125 mg  125 mg Oral Daily    pantoprazole (Protonix) DR tab 40 mg  40 mg Oral BID AC    magnesium hydroxide (Milk of Magnesia) 400 MG/5ML oral suspension 30 mL  30 mL Oral Daily PRN    octreoTIDe (SandoSTATIN) 100 mcg/mL injection 100 mcg  100 mcg Subcutaneous Q8H    ipratropium-albuterol (Duoneb) 0.5-2.5 (3) MG/3ML inhalation solution 3 mL  3 mL Nebulization Q6H PRN    tamsulosin (Flomax) cap 0.4 mg  0.4 mg Oral Daily    traMADol (Ultram) tab 50 mg  50 mg Oral Q12H PRN    glucose (Dex4) 15 GM/59ML oral liquid 15 g  15 g Oral Q15 Min PRN    Or    glucose (Glutose) 40% oral gel 15 g  15 g Oral Q15 Min PRN    Or    glucose-vitamin C (Dex-4) chewable tab 4 tablet  4 tablet Oral Q15 Min PRN    Or     dextrose 50% injection 50 mL  50 mL Intravenous Q15 Min PRN    Or    glucose (Dex4) 15 GM/59ML oral liquid 30 g  30 g Oral Q15 Min PRN    Or    glucose (Glutose) 40% oral gel 30 g  30 g Oral Q15 Min PRN    Or    glucose-vitamin C (Dex-4) chewable tab 8 tablet  8 tablet Oral Q15 Min PRN    insulin aspart (NovoLOG) 100 Units/mL FlexPen 1-7 Units  1-7 Units Subcutaneous TID CC    cyclobenzaprine (Flexeril) tab 10 mg  10 mg Oral Nightly    acetaminophen (Tylenol Extra Strength) tab 500 mg  500 mg Oral Q6H PRN    albuterol (Ventolin HFA) 108 (90 Base) MCG/ACT inhaler 2 puff  2 puff Inhalation Q4H PRN    fluticasone furoate-vilanterol (Breo Ellipta) 100-25 MCG/ACT inhaler 1 puff  1 puff Inhalation Daily    glycopyrrolate (Robinul) tab 1 mg  1 mg Oral TID    hydroxychloroquine (Plaquenil) tab 200 mg  200 mg Oral Daily    cetirizine (ZyrTEC) tab 5 mg  5 mg Oral Daily    montelukast (Singulair) tab 10 mg  10 mg Oral Nightly    predniSONE (Deltasone) tab 5 mg  5 mg Oral Daily    rosuvastatin (Crestor) tab 10 mg  10 mg Oral Nightly    [Held by provider] torsemide (Demadex) tab 20 mg  20 mg Oral Daily    melatonin tab 3 mg  3 mg Oral Nightly PRN    polyethylene glycol (PEG 3350) (Miralax) 17 g oral packet 17 g  17 g Oral Daily PRN    sennosides (Senokot) tab 17.2 mg  17.2 mg Oral Nightly PRN    bisacodyl (Dulcolax) 10 MG rectal suppository 10 mg  10 mg Rectal Daily PRN    ondansetron (Zofran) 4 MG/2ML injection 4 mg  4 mg Intravenous Q6H PRN    metoclopramide (Reglan) 5 mg/mL injection 5 mg  5 mg Intravenous Q8H PRN    benzonatate (Tessalon) cap 200 mg  200 mg Oral TID PRN    guaiFENesin (Robitussin) 100 MG/5 ML oral liquid 200 mg  200 mg Oral Q4H PRN    glycerin-hypromellose- (Artifical Tears) 0.2-0.2-1 % ophthalmic solution 1 drop  1 drop Both Eyes QID PRN    sodium chloride (Saline Mist) 0.65 % nasal solution 1 spray  1 spray Each Nare Q3H PRN       Continuous Infusions:     Physical Exam  Constitutional: no acute  distress  Eyes: PERRL  ENT: nares pateint  Neck: supple, no JVD  Cardio: RRR, S1 S2  Respiratory: clear to auscultation bilaterally, no wheezing, rales, rhonchi, crackles  GI: abdomen soft, non tender, active bowel sounds, no organomegaly  Extremities: no clubbing, cyanosis, edema  Neurologic: no gross motor deficits  Skin: warm, dry      Results:     Lab Results   Component Value Date    WBC 16.1 03/08/2024    HGB 8.4 03/08/2024    HCT 26.7 03/08/2024    .0 03/08/2024    CREATSERUM 1.04 03/08/2024    BUN 25 03/08/2024     03/08/2024    K 4.1 03/08/2024     03/08/2024    CO2 24.0 03/08/2024     03/08/2024    CA 8.3 03/08/2024    MG 1.5 03/08/2024       XR CHEST PA + LAT CHEST (CPT=71046)    Result Date: 3/6/2024  CONCLUSION:  1. Emphysema with linear scarring and or atelectasis in the mid and upper lungs.  No evidence of pneumonia. 2. Right Port-A-Cath with distal 2 cm kink back on itself and the tip pointing cephalad.     Dictated by (CST): Michael Gonzalez MD on 3/06/2024 at 4:20 PM     Finalized by (CST): Michael Gonzalez MD on 3/06/2024 at 4:25 PM                 Assessment   1.  Acute pulmonary embolism  2.  COPD  3.  Anemia  4.  GI bleed  5.  Bronchiectasis     Plan   -Patient presented with evidence of recurrent GI bleeding with AVMs.  Status post EGD with cauterization with multiple AVMs seen.  -Found to have evidence of acute pulmonary embolism.  Given recurrent bleeding underwent placement of IVC filter placement on 3/7/2024  -Eliquis stopped as a result.  Holding anticoagulation at this time  -Further recommendations per RANDY Williamson, DO  Pulmonary Critical Care Medicine  Located within [facility]

## 2024-03-08 NOTE — PLAN OF CARE
A&Ox4. Pt IVC filter placed today. Pt ambulates standby with walker, monitoring blood glucose levels per order- ACHS, SCDs in place, voiding via benson- remains in place, tolerating diet, on room air. Frequent rounding by nursing staff. Safety precautions maintained/call light within reach.    Problem: Patient Centered Care  Goal: Patient preferences are identified and integrated in the patient's plan of care  Description: Interventions:  - What would you like us to know as we care for you? From home with family  - Provide timely, complete, and accurate information to patient/family  - Incorporate patient and family knowledge, values, beliefs, and cultural backgrounds into the planning and delivery of care  - Encourage patient/family to participate in care and decision-making at the level they choose  - Honor patient and family perspectives and choices  Outcome: Progressing     Problem: CARDIOVASCULAR - ADULT  Goal: Maintains optimal cardiac output and hemodynamic stability  Description: INTERVENTIONS:  - Monitor vital signs, rhythm, and trends  - Monitor for bleeding, hypotension and signs of decreased cardiac output  - Evaluate effectiveness of vasoactive medications to optimize hemodynamic stability  - Monitor arterial and/or venous puncture sites for bleeding and/or hematoma  - Assess quality of pulses, skin color and temperature  - Assess for signs of decreased coronary artery perfusion - ex. Angina  - Evaluate fluid balance, assess for edema, trend weights  Outcome: Progressing     Problem: RESPIRATORY - ADULT  Goal: Achieves optimal ventilation and oxygenation  Description: INTERVENTIONS:  - Assess for changes in respiratory status  - Assess for changes in mentation and behavior  - Position to facilitate oxygenation and minimize respiratory effort  - Oxygen supplementation based on oxygen saturation or ABGs  - Provide Smoking Cessation handout, if applicable  - Encourage broncho-pulmonary hygiene including  cough, deep breathe, Incentive Spirometry  - Assess the need for suctioning and perform as needed  - Assess and instruct to report SOB or any respiratory difficulty  - Respiratory Therapy support as indicated  - Manage/alleviate anxiety  - Monitor for signs/symptoms of CO2 retention  Outcome: Progressing     Problem: PAIN - ADULT  Goal: Verbalizes/displays adequate comfort level or patient's stated pain goal  Description: INTERVENTIONS:  - Encourage pt to monitor pain and request assistance  - Assess pain using appropriate pain scale  - Administer analgesics based on type and severity of pain and evaluate response  - Implement non-pharmacological measures as appropriate and evaluate response  - Consider cultural and social influences on pain and pain management  - Manage/alleviate anxiety  - Utilize distraction and/or relaxation techniques  - Monitor for opioid side effects  - Notify MD/LIP if interventions unsuccessful or patient reports new pain  - Anticipate increased pain with activity and pre-medicate as appropriate  Outcome: Progressing     Problem: RISK FOR INFECTION - ADULT  Goal: Absence of fever/infection during anticipated neutropenic period  Description: INTERVENTIONS  - Monitor WBC  - Administer growth factors as ordered  - Implement neutropenic guidelines  Outcome: Progressing     Problem: SAFETY ADULT - FALL  Goal: Free from fall injury  Description: INTERVENTIONS:  - Assess pt frequently for physical needs  - Identify cognitive and physical deficits and behaviors that affect risk of falls.  - [place] fall precautions as indicated by assessment.  - Educate pt/family on patient safety including physical limitations  - Instruct pt to call for assistance with activity based on assessment  - Modify environment to reduce risk of injury  - Provide assistive devices as appropriate  - Consider OT/PT consult to assist with strengthening/mobility  - Encourage toileting schedule  Outcome: Progressing

## 2024-03-08 NOTE — TELEPHONE ENCOUNTER
Hi Dr. Mcqueen,     It looks like Sandostatin 20 mg PA is approved for a 30 day supply each time.     Please review and sign pended order and I will follow up if med goes through.     Thank you.

## 2024-03-08 NOTE — PLAN OF CARE
Dressing changed to right port. C.diff sample sent with negative result.     Problem: Patient Centered Care  Goal: Patient preferences are identified and integrated in the patient's plan of care  Description: Interventions:  - What would you like us to know as we care for you? From home with family  - Provide timely, complete, and accurate information to patient/family  - Incorporate patient and family knowledge, values, beliefs, and cultural backgrounds into the planning and delivery of care  - Encourage patient/family to participate in care and decision-making at the level they choose  - Honor patient and family perspectives and choices  Outcome: Progressing     Problem: CARDIOVASCULAR - ADULT  Goal: Maintains optimal cardiac output and hemodynamic stability  Description: INTERVENTIONS:  - Monitor vital signs, rhythm, and trends  - Monitor for bleeding, hypotension and signs of decreased cardiac output  - Evaluate effectiveness of vasoactive medications to optimize hemodynamic stability  - Monitor arterial and/or venous puncture sites for bleeding and/or hematoma  - Assess quality of pulses, skin color and temperature  - Assess for signs of decreased coronary artery perfusion - ex. Angina  - Evaluate fluid balance, assess for edema, trend weights  Outcome: Progressing     Problem: RESPIRATORY - ADULT  Goal: Achieves optimal ventilation and oxygenation  Description: INTERVENTIONS:  - Assess for changes in respiratory status  - Assess for changes in mentation and behavior  - Position to facilitate oxygenation and minimize respiratory effort  - Oxygen supplementation based on oxygen saturation or ABGs  - Provide Smoking Cessation handout, if applicable  - Encourage broncho-pulmonary hygiene including cough, deep breathe, Incentive Spirometry  - Assess the need for suctioning and perform as needed  - Assess and instruct to report SOB or any respiratory difficulty  - Respiratory Therapy support as indicated  -  Manage/alleviate anxiety  - Monitor for signs/symptoms of CO2 retention  Outcome: Progressing     Problem: PAIN - ADULT  Goal: Verbalizes/displays adequate comfort level or patient's stated pain goal  Description: INTERVENTIONS:  - Encourage pt to monitor pain and request assistance  - Assess pain using appropriate pain scale  - Administer analgesics based on type and severity of pain and evaluate response  - Implement non-pharmacological measures as appropriate and evaluate response  - Consider cultural and social influences on pain and pain management  - Manage/alleviate anxiety  - Utilize distraction and/or relaxation techniques  - Monitor for opioid side effects  - Notify MD/LIP if interventions unsuccessful or patient reports new pain  - Anticipate increased pain with activity and pre-medicate as appropriate  Outcome: Progressing     Problem: RISK FOR INFECTION - ADULT  Goal: Absence of fever/infection during anticipated neutropenic period  Description: INTERVENTIONS  - Monitor WBC  - Administer growth factors as ordered  - Implement neutropenic guidelines  Outcome: Progressing     Problem: SAFETY ADULT - FALL  Goal: Free from fall injury  Description: INTERVENTIONS:  - Assess pt frequently for physical needs  - Identify cognitive and physical deficits and behaviors that affect risk of falls.  - [place] fall precautions as indicated by assessment.  - Educate pt/family on patient safety including physical limitations  - Instruct pt to call for assistance with activity based on assessment  - Modify environment to reduce risk of injury  - Provide assistive devices as appropriate  - Consider OT/PT consult to assist with strengthening/mobility  - Encourage toileting schedule  Outcome: Progressing     Problem: Patient/Family Goals  Goal: Patient/Family Long Term Goal  Description: Patient's Long Term Goal: to go home    Interventions:  - - Monitor vitals  - Monitor appropriate labs  - Pain management  - Follow MD  order  - Diagnostics per order  - Update/Informing patient and family on plan of care  - Discharge planning  - See additional Care Plan goals for specific interventions  Outcome: Progressing  Goal: Patient/Family Short Term Goal  Description: Patient's Short Term Goal: to feel better    Interventions:   - - Monitor vitals  - Monitor appropriate labs  - Pain management  - Follow MD order  - Diagnostics per order  - Update/Informing patient and family on plan of care  - Discharge planning  - See additional Care Plan goals for specific interventions  Outcome: Progressing     Problem: DISCHARGE PLANNING  Goal: Discharge to home or other facility with appropriate resources  Description: INTERVENTIONS:  - Identify barriers to discharge w/pt and caregiver  - Include patient/family/discharge partner in discharge planning  - Arrange for needed discharge resources and transportation as appropriate  - Identify discharge learning needs (meds, wound care, etc)  - Arrange for interpreters to assist at discharge as needed  - Consider post-discharge preferences of patient/family/discharge partner  - Complete POLST form as appropriate  - Assess patient's ability to be responsible for managing their own health  - Refer to Case Management Department for coordinating discharge planning if the patient needs post-hospital services based on physician/LIP order or complex needs related to functional status, cognitive ability or social support system  Outcome: Progressing

## 2024-03-08 NOTE — PROGRESS NOTES
[facility]     Gastroenterology Progress Note    Chester Bautista Patient Status:  Inpatient    1940 MRN [id number]   Location [facility]5W Attending Heike Sorto MD   Hosp Day # 14 PCP Heike Sorto MD       Subjective:   Feels fatigued and tired today. She has not had a bowel movement since colonoscopy prep last week. No abdominal pain, n/v.      Objective:   Blood pressure 113/51, pulse 85, temperature 98.8 °F (37.1 °C), temperature source Oral, resp. rate 14, height 5' 1\" (1.549 m), weight 129 lb 6.4 oz (58.7 kg), SpO2 94%. Body mass index is 24.45 kg/m².    General: awake, alert and oriented, no acute distress  HEENT: moist mucus membranes  PULM: no conversational dyspnea  CARDIOVASCULAR: regular rate and rhythm, the extremities are warm and well perfused  GI: soft, non-tender, non-distended, + BS, no rebound/guarding   EXTREMITIES: no edema, moving all extremities  SKIN: no visible rash  NEURO: appropriate and interactive    Assessment and Plan:   Chester Bautista is a a(n) 84 year old female w/ a history of breast cancer s/p lumpectomy and radiation, rheumatoid arthritis, COPD, HTN, HLD, DM, who presented with shortness of breath found to have new PE started on XARELTO this admission. GI consulted for acute on chronic anemia.    EGD with multiple gastric avm's s/p APC and multiple duodenal avms left undisturbed. Resumed on XARELTO     Hgb drifted down to 7.2 on 3/6 s/p 1 unit prbc to hgb 8.2 today. No overt bleeding, brown stool today.     - consider giving 1 unit prbc today  - anticoagulation will be an ongoing issue in this patient with significant AVM disease. S/p IVC filter on 3/7 and anticoagulation now stopped. Would discuss need for anticoagulation with pulm   - continue octreotide subcutaneous, and PA process for sandostatin LAR  - continue PPI  - reserve repeat EGD with APC for significant bleeding. Suspect she will have chronic AVM  disease given its natural course. Utility of prophylactic endoscopic treatment is not clear as she will continue to grow new ones        Zoila Mcqueen MD  [facility] Gastroenterology      Results:     Lab Results   Component Value Date    WBC 16.1 (H) 03/08/2024    HGB 8.2 (L) 03/08/2024    HCT 25.9 (L) 03/08/2024    .0 03/08/2024    CREATSERUM 1.04 (H) 03/08/2024    BUN 25 (H) 03/08/2024     03/08/2024    K 4.1 03/08/2024     03/08/2024    CO2 24.0 03/08/2024     (H) 03/08/2024    CA 8.3 (L) 03/08/2024    ALB 3.4 02/24/2024    ALKPHO 63 02/24/2024    BILT 0.2 02/24/2024    TP 6.4 02/24/2024    AST 18 02/24/2024    ALT <7 (L) 02/24/2024    PTT 69.9 (H) 02/28/2024    INR 0.93 12/14/2023    TSH 0.622 10/20/2023    DDIMER 1.60 (H) 02/23/2024    ESRML 91 (H) 09/14/2023    CRP 1.10 (H) 09/14/2023    MG 1.5 (L) 03/08/2024    B12 748 10/20/2023       XR CHEST PA + LAT CHEST (CPT=71046)    Result Date: 3/6/2024  CONCLUSION:  1. Emphysema with linear scarring and or atelectasis in the mid and upper lungs.  No evidence of pneumonia. 2. Right Port-A-Cath with distal 2 cm kink back on itself and the tip pointing cephalad.     Dictated by (CST): Michael Gonzalez MD on 3/06/2024 at 4:20 PM     Finalized by (CST): Michael Gonzalez MD on 3/06/2024 at 4:25 PM

## 2024-03-09 PROBLEM — K31.819 GASTRIC AVM: Status: ACTIVE | Noted: 2024-03-09

## 2024-03-09 PROBLEM — I26.99 PE (PULMONARY THROMBOEMBOLISM) (HCC): Status: ACTIVE | Noted: 2024-03-09

## 2024-03-09 LAB
BASOPHILS # BLD AUTO: 0.06 X10(3) UL (ref 0–0.2)
BASOPHILS NFR BLD AUTO: 0.4 %
DEPRECATED RDW RBC AUTO: 51.4 FL (ref 35.1–46.3)
EOSINOPHIL # BLD AUTO: 0.2 X10(3) UL (ref 0–0.7)
EOSINOPHIL NFR BLD AUTO: 1.4 %
ERYTHROCYTE [DISTWIDTH] IN BLOOD BY AUTOMATED COUNT: 16.4 % (ref 11–15)
GLUCOSE BLDC GLUCOMTR-MCNC: 126 MG/DL (ref 70–99)
GLUCOSE BLDC GLUCOMTR-MCNC: 129 MG/DL (ref 70–99)
GLUCOSE BLDC GLUCOMTR-MCNC: 142 MG/DL (ref 70–99)
GLUCOSE BLDC GLUCOMTR-MCNC: 182 MG/DL (ref 70–99)
HCT VFR BLD AUTO: 24.2 %
HGB BLD-MCNC: 7.9 G/DL
IMM GRANULOCYTES # BLD AUTO: 0.37 X10(3) UL (ref 0–1)
IMM GRANULOCYTES NFR BLD: 2.5 %
LYMPHOCYTES # BLD AUTO: 1.44 X10(3) UL (ref 1–4)
LYMPHOCYTES NFR BLD AUTO: 9.9 %
MAGNESIUM SERPL-MCNC: 1.9 MG/DL (ref 1.6–2.6)
MCH RBC QN AUTO: 30.5 PG (ref 26–34)
MCHC RBC AUTO-ENTMCNC: 32.6 G/DL (ref 31–37)
MCV RBC AUTO: 93.4 FL
MONOCYTES # BLD AUTO: 1.44 X10(3) UL (ref 0.1–1)
MONOCYTES NFR BLD AUTO: 9.9 %
NEUTROPHILS # BLD AUTO: 11.06 X10 (3) UL (ref 1.5–7.7)
NEUTROPHILS # BLD AUTO: 11.06 X10(3) UL (ref 1.5–7.7)
NEUTROPHILS NFR BLD AUTO: 75.9 %
PLATELET # BLD AUTO: 251 10(3)UL (ref 150–450)
RBC # BLD AUTO: 2.59 X10(6)UL
WBC # BLD AUTO: 14.6 X10(3) UL (ref 4–11)

## 2024-03-09 PROCEDURE — 91110 GI TRC IMG INTRAL ESOPH-ILE: CPT | Performed by: INTERNAL MEDICINE

## 2024-03-09 PROCEDURE — 99232 SBSQ HOSP IP/OBS MODERATE 35: CPT | Performed by: INTERNAL MEDICINE

## 2024-03-09 PROCEDURE — 0DJ07ZZ INSPECTION OF UPPER INTESTINAL TRACT, VIA NATURAL OR ARTIFICIAL OPENING: ICD-10-PCS | Performed by: INTERNAL MEDICINE

## 2024-03-09 PROCEDURE — 99233 SBSQ HOSP IP/OBS HIGH 50: CPT | Performed by: INTERNAL MEDICINE

## 2024-03-09 NOTE — PLAN OF CARE

## 2024-03-09 NOTE — PROGRESS NOTES
[facility]    Progress Note      Assessment and Plan:   1.  Acute venous thromboembolism-the patient continued to bleed on anticoagulation and subsequently the anticoagulation was stopped and IVC filter deployed.     Recommendations:  1.  Conservative management  2.  Morning CBC  3.  Will follow clinically  4.  Discharge planning  5.  Perhaps at some juncture at the mid to long-term, could consider low-dose anticoagulation, but will try to stay away from full dose anticoagulation.     2.  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  There also was question of prior history of sarcoidosis.  There is leukocytosis at present.  She carries prior diagnosis pneumonia recently.     Recommendations: Patient should get a follow-up CT scan of the chest at the 6 to 8-week interval.    3.  GI bleeding-await capsule endoscopy.    4.  DARWIN  Subjective:   Chester Bautista is a(n) 84 year old female who is breathing much more comfortably, without further evidence of active bleeding, but now has worsening creatinine.    Objective:   Blood pressure 139/67, pulse 77, temperature 98.4 °F (36.9 °C), temperature source Oral, resp. rate 18, height 5' 1\" (1.549 m), weight 129 lb 6.4 oz (58.7 kg), SpO2 94%.    Physical Exam alert black female  HEENT examination is unremarkable with pupils equal round and reactive to light and accommodation.   Neck without adenopathy, thyromegaly, JVD nor bruit.   Lungs slightly diminished to auscultation and percussion.  Cardiac regular rate and rhythm no murmur.   Abdomen nontender, without hepatosplenomegaly and no mass appreciable.   Extremities without clubbing cyanosis nor edema.   Neurologic grossly intact with symmetric tone and strength and reflex.  Skin without gross abnormality     Results:     Lab Results   Component Value Date    WBC 14.6 03/09/2024    HGB  7.9 03/09/2024    HCT 24.2 03/09/2024    .0 03/09/2024    MG 1.9 03/09/2024       Chapin Parker MD  Medical Director, Critical Care, [facility]  Medical Director, [facility]  Pager: [phone]

## 2024-03-09 NOTE — PROGRESS NOTES
[facility]  part of Merg[facility] with [facility]    Progress Note    Chester Bautista Patient Status:  Inpatient    1940 MRN [id number]   Location [facility]5W Attending Heike Sorto MD   Hosp Day # 15 PCP Heike Sorto MD     Chief Complaint: She is doing okay.    Subjective:     Constitutional:  Positive for activity change and fatigue.   HENT: Negative.     Respiratory: Negative.     Cardiovascular: Negative.    Gastrointestinal: Negative.    Endocrine: Negative.    Genitourinary: Negative.    Allergic/Immunologic: Negative.    Neurological: Negative.    Hematological: Negative.    Psychiatric/Behavioral: Negative.         Objective:   Blood pressure 118/69, pulse 86, temperature 98.6 °F (37 °C), temperature source Oral, resp. rate 16, height 5' 1\" (1.549 m), weight 129 lb 6.4 oz (58.7 kg), SpO2 96%.  Physical Exam  Vitals and nursing note reviewed.   Constitutional:       Appearance: Normal appearance.   HENT:      Head: Normocephalic and atraumatic.   Cardiovascular:      Rate and Rhythm: Normal rate and regular rhythm.      Pulses: Normal pulses.      Heart sounds: Normal heart sounds.   Pulmonary:      Effort: Pulmonary effort is normal.      Breath sounds: Normal breath sounds.   Abdominal:      Palpations: Abdomen is soft.   Musculoskeletal:         General: Normal range of motion.      Cervical back: Normal range of motion and neck supple.   Skin:     General: Skin is warm.   Neurological:      Mental Status: She is alert. Mental status is at baseline.         Results:   Lab Results   Component Value Date    WBC 14.6 (H) 2024    HGB 7.9 (L) 2024    HCT 24.2 (L) 2024    .0 2024    CREATSERUM 1.04 (H) 2024    BUN 25 (H) 2024     2024    K 4.1 2024     2024    CO2 24.0 2024     (H) 2024    CA 8.3 (L) 2024    ALB 3.4 2024    ALKPHO 63 2024    BILT 0.2 2024     TP 6.4 02/24/2024    AST 18 02/24/2024    ALT <7 (L) 02/24/2024    PTT 69.9 (H) 02/28/2024    INR 0.93 12/14/2023    TSH 0.622 10/20/2023    DDIMER 1.60 (H) 02/23/2024    ESRML 91 (H) 09/14/2023    CRP 1.10 (H) 09/14/2023    MG 1.9 03/09/2024    TROPHS 9 02/23/2024    B12 748 10/20/2023       No results found.        Assessment & Plan:      PE (pulmonary thromboembolism) (HCC) from right lower lobe pulmonary artery to multiple segmental arteries on CT 2-23-24.   CT without evidence of RV strain.  Without acute cor pulmonale.  Doppler negative for DVT on 2-23-24.  Due to blood loss anemia  N Status post IVC filter March 7, 2024.  Hold Eliquis        Type 2 diabetes mellitus without retinopathy (HCC)  Continue with Accu-Chek continuous sliding scale insulin hypoglycemia           Hypertension  Continue to monitor continue with current medication      Chronic obstructive pulmonary disease, unspecified (HCC)  Stable.  Nebs as needed.  Pulmonary following patient.       ABLA (acute blood loss anemia)  History of AVMs.  Status post endoscopy.  Multiple AVMs have been cauterized with second EGD 2-28-24:,  Continue with a soft continuous octreotide awaiting approval Sandostatin.  Continue PPI continue to monitor hemoglobin she might need evaluation at [facility]        elevated white count.  May be due to GI bleed.  Improved compared to yesterday no source of infection        Hyperkalemia.   Resolved.        DARWIN.   Improved        UTI.  On IV antibiotics.  Urine culture positive for Klebsiella aerogenes sensitive to ceftriaxone.  Finished IV antibiotics.  DC'ed.         RA.  Stable on prednisone and hydroxychloroquine.  Follows up with rheum as an outpatient.         Hyperlipidemia.  On statin.        Abnormal CT chest.  With bilateral fibrosis and cystic changes.  Pulmonary following patient.         Urinary retention.   Alonso inserted.  And follow-up with urology in 1 week.         Hypomagnesemia.  Supplement and  monitor.  Resolved        Dizzy, orthostatic positive  Fall precautions.  Improved       Nosebleed .   Resolved.         Diarrhea.   resolved       VARINDER LATHAM MD  3/9/2024

## 2024-03-09 NOTE — PROGRESS NOTES
[facility]     Gastroenterology Progress Note    Chester Bautista Patient Status:  Inpatient    1940 MRN [id number]   Location [facility]5W Attending Heike Sorto MD   Hosp Day # 15 PCP Heike Sorto MD       Subjective:   She feels slightly better today however hgb continues to decline. No abdominal pain, n/v. Bm brown but they are always brown.     Objective:   Blood pressure 118/69, pulse 86, temperature 98.6 °F (37 °C), temperature source Oral, resp. rate 16, height 5' 1\" (1.549 m), weight 129 lb 6.4 oz (58.7 kg), SpO2 96%. Body mass index is 24.45 kg/m².    General: awake, alert and oriented, no acute distress  HEENT: moist mucus membranes  PULM: no conversational dyspnea  CARDIOVASCULAR: regular rate and rhythm, the extremities are warm and well perfused  GI: soft, non-tender, non-distended, + BS, no rebound/guarding   EXTREMITIES: no edema, moving all extremities  SKIN: no visible rash  NEURO: appropriate and interactive    Assessment and Plan:   Chester Bautista is a a(n) 84 year old female w/ a history of breast cancer s/p lumpectomy and radiation, rheumatoid arthritis, COPD, HTN, HLD, DM, who presented with shortness of breath found to have new PE started on XARELTO this admission. GI consulted for acute on chronic anemia.    EGD with multiple gastric avm's s/p APC and multiple duodenal avms left undisturbed. Resumed on XARELTO 2- but with continued recurrent anemia, she underwent IVC filter placement 3/7 and anticoagulation has been discontinued.     Hgb continues to drift down     - discussed role of repeat VCE with patient as this may help to map out small bowel AVMs and notably assess for any brisk bleeding lesion to account for anemia. If no active bleeding seen, would not pursue any further endoscopy at this time  - Would discuss need for anticoagulation with pulm   - continue octreotide subcutaneous, and PA process for sandostatin  LAR  - continue PPI  - reserve repeat EGD with APC for significant bleeding. Suspect she will have chronic AVM disease given its natural course, and may be transfusion dependent. Utility of prophylactic endoscopic treatment is not clear as she will continue to grow new ones. She may consider obtaining [facility] opinion as outpatient.        Zoila Mcqueen MD  [facility] Gastroenterology      Results:     Lab Results   Component Value Date    WBC 14.6 (H) 03/09/2024    HGB 7.9 (L) 03/09/2024    HCT 24.2 (L) 03/09/2024    .0 03/09/2024    CREATSERUM 1.04 (H) 03/08/2024    BUN 25 (H) 03/08/2024     03/08/2024    K 4.1 03/08/2024     03/08/2024    CO2 24.0 03/08/2024     (H) 03/08/2024    CA 8.3 (L) 03/08/2024    ALB 3.4 02/24/2024    ALKPHO 63 02/24/2024    BILT 0.2 02/24/2024    TP 6.4 02/24/2024    AST 18 02/24/2024    ALT <7 (L) 02/24/2024    PTT 69.9 (H) 02/28/2024    INR 0.93 12/14/2023    TSH 0.622 10/20/2023    DDIMER 1.60 (H) 02/23/2024    ESRML 91 (H) 09/14/2023    CRP 1.10 (H) 09/14/2023    MG 1.9 03/09/2024    B12 748 10/20/2023       No results found.

## 2024-03-10 LAB
ANION GAP SERPL CALC-SCNC: 8 MMOL/L (ref 0–18)
BASOPHILS # BLD AUTO: 0.05 X10(3) UL (ref 0–0.2)
BASOPHILS NFR BLD AUTO: 0.4 %
BUN BLD-MCNC: 16 MG/DL (ref 9–23)
BUN/CREAT SERPL: 18.2 (ref 10–20)
CALCIUM BLD-MCNC: 8.4 MG/DL (ref 8.7–10.4)
CHLORIDE SERPL-SCNC: 111 MMOL/L (ref 98–112)
CO2 SERPL-SCNC: 23 MMOL/L (ref 21–32)
CREAT BLD-MCNC: 0.88 MG/DL
DEPRECATED RDW RBC AUTO: 54.5 FL (ref 35.1–46.3)
EGFRCR SERPLBLD CKD-EPI 2021: 64 ML/MIN/1.73M2 (ref 60–?)
EOSINOPHIL # BLD AUTO: 0.23 X10(3) UL (ref 0–0.7)
EOSINOPHIL NFR BLD AUTO: 1.9 %
ERYTHROCYTE [DISTWIDTH] IN BLOOD BY AUTOMATED COUNT: 17.2 % (ref 11–15)
GLUCOSE BLD-MCNC: 83 MG/DL (ref 70–99)
GLUCOSE BLDC GLUCOMTR-MCNC: 260 MG/DL (ref 70–99)
GLUCOSE BLDC GLUCOMTR-MCNC: 264 MG/DL (ref 70–99)
GLUCOSE BLDC GLUCOMTR-MCNC: 402 MG/DL (ref 70–99)
GLUCOSE BLDC GLUCOMTR-MCNC: 85 MG/DL (ref 70–99)
HCT VFR BLD AUTO: 25.5 %
HGB BLD-MCNC: 7.8 G/DL
IMM GRANULOCYTES # BLD AUTO: 0.14 X10(3) UL (ref 0–1)
IMM GRANULOCYTES NFR BLD: 1.2 %
LYMPHOCYTES # BLD AUTO: 1.31 X10(3) UL (ref 1–4)
LYMPHOCYTES NFR BLD AUTO: 11 %
MCH RBC QN AUTO: 29.3 PG (ref 26–34)
MCHC RBC AUTO-ENTMCNC: 30.6 G/DL (ref 31–37)
MCV RBC AUTO: 95.9 FL
MONOCYTES # BLD AUTO: 1.12 X10(3) UL (ref 0.1–1)
MONOCYTES NFR BLD AUTO: 9.4 %
NEUTROPHILS # BLD AUTO: 9.08 X10 (3) UL (ref 1.5–7.7)
NEUTROPHILS # BLD AUTO: 9.08 X10(3) UL (ref 1.5–7.7)
NEUTROPHILS NFR BLD AUTO: 76.1 %
OSMOLALITY SERPL CALC.SUM OF ELEC: 294 MOSM/KG (ref 275–295)
PLATELET # BLD AUTO: 272 10(3)UL (ref 150–450)
POTASSIUM SERPL-SCNC: 3.8 MMOL/L (ref 3.5–5.1)
RBC # BLD AUTO: 2.66 X10(6)UL
SODIUM SERPL-SCNC: 142 MMOL/L (ref 136–145)
WBC # BLD AUTO: 11.9 X10(3) UL (ref 4–11)

## 2024-03-10 PROCEDURE — 99232 SBSQ HOSP IP/OBS MODERATE 35: CPT | Performed by: INTERNAL MEDICINE

## 2024-03-10 RX ORDER — POTASSIUM CHLORIDE 20 MEQ/1
40 TABLET, EXTENDED RELEASE ORAL ONCE
Status: COMPLETED | OUTPATIENT
Start: 2024-03-10 | End: 2024-03-10

## 2024-03-10 NOTE — PROGRESS NOTES
[facility]    Progress Note      Assessment and Plan:   1.  Acute venous thromboembolism-the patient continued to bleed on anticoagulation and subsequently the anticoagulation was stopped and IVC filter deployed.  Hemoglobin is relatively stable now at 7.8.     Recommendations:  1.  Conservative management  2.  Await capsule endoscopy  3.  Will follow clinically  4.  Discharge planning  5.  Perhaps at some juncture at the mid to long-term, could consider low-dose anticoagulation, but will try to stay away from full dose anticoagulation.     2.  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  There also was question of prior history of sarcoidosis.  There is leukocytosis at present.  She carries prior diagnosis pneumonia recently.     Recommendations: Patient should get a follow-up CT scan of the chest at the 6 to 8-week interval.    3.  GI bleeding-await capsule endoscopy.    4.  DARWIN-resolved.  Would be okay to remove Alonso from my perspective.      Subjective:   Chester Bautista is a(n) 84 year old female who is breathing much more comfortably, without further evidence of active bleeding, but now has worsening creatinine.    Objective:   Blood pressure 108/59, pulse 84, temperature 97.9 °F (36.6 °C), temperature source Oral, resp. rate 18, height 5' 1\" (1.549 m), weight 129 lb 6.4 oz (58.7 kg), SpO2 94%.    Physical Exam alert black female  HEENT examination is unremarkable with pupils equal round and reactive to light and accommodation.   Neck without adenopathy, thyromegaly, JVD nor bruit.   Lungs slightly diminished to auscultation and percussion.  Cardiac regular rate and rhythm no murmur.   Abdomen nontender, without hepatosplenomegaly and no mass appreciable.   Extremities without clubbing cyanosis nor edema.   Neurologic grossly intact with symmetric tone and strength and  reflex.  Skin without gross abnormality     Results:     Lab Results   Component Value Date    WBC 11.9 03/10/2024    HGB 7.8 03/10/2024    HCT 25.5 03/10/2024    .0 03/10/2024    CREATSERUM 0.88 03/10/2024    BUN 16 03/10/2024     03/10/2024    K 3.8 03/10/2024     03/10/2024    CO2 23.0 03/10/2024    GLU 83 03/10/2024    CA 8.4 03/10/2024       Chapin Parker MD  Medical Director, Critical Care, [facility]  Medical Director, [facility]  Pager: [phone]

## 2024-03-10 NOTE — PLAN OF CARE
The patient is A/0x4, night time sugar of 402, supplemental insulin provided per order, GI capsule endoscopy to be evaluated, benson in place per providers order, call light in place     Problem: Patient Centered Care  Goal: Patient preferences are identified and integrated in the patient's plan of care  Description: Interventions:  - What would you like us to know as we care for you? From home with family  - Provide timely, complete, and accurate information to patient/family  - Incorporate patient and family knowledge, values, beliefs, and cultural backgrounds into the planning and delivery of care  - Encourage patient/family to participate in care and decision-making at the level they choose  - Honor patient and family perspectives and choices  Outcome: Progressing     Problem: CARDIOVASCULAR - ADULT  Goal: Maintains optimal cardiac output and hemodynamic stability  Description: INTERVENTIONS:  - Monitor vital signs, rhythm, and trends  - Monitor for bleeding, hypotension and signs of decreased cardiac output  - Evaluate effectiveness of vasoactive medications to optimize hemodynamic stability  - Monitor arterial and/or venous puncture sites for bleeding and/or hematoma  - Assess quality of pulses, skin color and temperature  - Assess for signs of decreased coronary artery perfusion - ex. Angina  - Evaluate fluid balance, assess for edema, trend weights  Outcome: Progressing     Problem: RESPIRATORY - ADULT  Goal: Achieves optimal ventilation and oxygenation  Description: INTERVENTIONS:  - Assess for changes in respiratory status  - Assess for changes in mentation and behavior  - Position to facilitate oxygenation and minimize respiratory effort  - Oxygen supplementation based on oxygen saturation or ABGs  - Provide Smoking Cessation handout, if applicable  - Encourage broncho-pulmonary hygiene including cough, deep breathe, Incentive Spirometry  - Assess the need for suctioning and perform as needed  - Assess and  instruct to report SOB or any respiratory difficulty  - Respiratory Therapy support as indicated  - Manage/alleviate anxiety  - Monitor for signs/symptoms of CO2 retention  Outcome: Progressing     Problem: PAIN - ADULT  Goal: Verbalizes/displays adequate comfort level or patient's stated pain goal  Description: INTERVENTIONS:  - Encourage pt to monitor pain and request assistance  - Assess pain using appropriate pain scale  - Administer analgesics based on type and severity of pain and evaluate response  - Implement non-pharmacological measures as appropriate and evaluate response  - Consider cultural and social influences on pain and pain management  - Manage/alleviate anxiety  - Utilize distraction and/or relaxation techniques  - Monitor for opioid side effects  - Notify MD/LIP if interventions unsuccessful or patient reports new pain  - Anticipate increased pain with activity and pre-medicate as appropriate  Outcome: Progressing     Problem: RISK FOR INFECTION - ADULT  Goal: Absence of fever/infection during anticipated neutropenic period  Description: INTERVENTIONS  - Monitor WBC  - Administer growth factors as ordered  - Implement neutropenic guidelines  Outcome: Progressing     Problem: DISCHARGE PLANNING  Goal: Discharge to home or other facility with appropriate resources  Description: INTERVENTIONS:  - Identify barriers to discharge w/pt and caregiver  - Include patient/family/discharge partner in discharge planning  - Arrange for needed discharge resources and transportation as appropriate  - Identify discharge learning needs (meds, wound care, etc)  - Arrange for interpreters to assist at discharge as needed  - Consider post-discharge preferences of patient/family/discharge partner  - Complete POLST form as appropriate  - Assess patient's ability to be responsible for managing their own health  - Refer to Case Management Department for coordinating discharge planning if the patient needs post-hospital  services based on physician/LIP order or complex needs related to functional status, cognitive ability or social support system  Outcome: Progressing     Problem: SAFETY ADULT - FALL  Goal: Free from fall injury  Description: INTERVENTIONS:  - Assess pt frequently for physical needs  - Identify cognitive and physical deficits and behaviors that affect risk of falls.  - [place] fall precautions as indicated by assessment.  - Educate pt/family on patient safety including physical limitations  - Instruct pt to call for assistance with activity based on assessment  - Modify environment to reduce risk of injury  - Provide assistive devices as appropriate  - Consider OT/PT consult to assist with strengthening/mobility  - Encourage toileting schedule  Outcome: Progressing     Problem: Patient/Family Goals  Goal: Patient/Family Long Term Goal  Description: Patient's Long Term Goal: to go home    Interventions:  - - Monitor vitals  - Monitor appropriate labs  - Pain management  - Follow MD order  - Diagnostics per order  - Update/Informing patient and family on plan of care  - Discharge planning  - See additional Care Plan goals for specific interventions  Outcome: Progressing  Goal: Patient/Family Short Term Goal  Description: Patient's Short Term Goal: to feel better    Interventions:   - - Monitor vitals  - Monitor appropriate labs  - Pain management  - Follow MD order  - Diagnostics per order  - Update/Informing patient and family on plan of care  - Discharge planning  - See additional Care Plan goals for specific interventions  Outcome: Progressing

## 2024-03-10 NOTE — PLAN OF CARE
Patient alert and oriented, RA. No acute events throughout the night. Educated patient to call for assistance, call light within reach.    Problem: Patient Centered Care  Goal: Patient preferences are identified and integrated in the patient's plan of care  Description: Interventions:  - What would you like us to know as we care for you? From home with family  - Provide timely, complete, and accurate information to patient/family  - Incorporate patient and family knowledge, values, beliefs, and cultural backgrounds into the planning and delivery of care  - Encourage patient/family to participate in care and decision-making at the level they choose  - Honor patient and family perspectives and choices  Outcome: Progressing     Problem: CARDIOVASCULAR - ADULT  Goal: Maintains optimal cardiac output and hemodynamic stability  Description: INTERVENTIONS:  - Monitor vital signs, rhythm, and trends  - Monitor for bleeding, hypotension and signs of decreased cardiac output  - Evaluate effectiveness of vasoactive medications to optimize hemodynamic stability  - Monitor arterial and/or venous puncture sites for bleeding and/or hematoma  - Assess quality of pulses, skin color and temperature  - Assess for signs of decreased coronary artery perfusion - ex. Angina  - Evaluate fluid balance, assess for edema, trend weights  Outcome: Progressing     Problem: RESPIRATORY - ADULT  Goal: Achieves optimal ventilation and oxygenation  Description: INTERVENTIONS:  - Assess for changes in respiratory status  - Assess for changes in mentation and behavior  - Position to facilitate oxygenation and minimize respiratory effort  - Oxygen supplementation based on oxygen saturation or ABGs  - Provide Smoking Cessation handout, if applicable  - Encourage broncho-pulmonary hygiene including cough, deep breathe, Incentive Spirometry  - Assess the need for suctioning and perform as needed  - Assess and instruct to report SOB or any respiratory  difficulty  - Respiratory Therapy support as indicated  - Manage/alleviate anxiety  - Monitor for signs/symptoms of CO2 retention  Outcome: Progressing     Problem: PAIN - ADULT  Goal: Verbalizes/displays adequate comfort level or patient's stated pain goal  Description: INTERVENTIONS:  - Encourage pt to monitor pain and request assistance  - Assess pain using appropriate pain scale  - Administer analgesics based on type and severity of pain and evaluate response  - Implement non-pharmacological measures as appropriate and evaluate response  - Consider cultural and social influences on pain and pain management  - Manage/alleviate anxiety  - Utilize distraction and/or relaxation techniques  - Monitor for opioid side effects  - Notify MD/LIP if interventions unsuccessful or patient reports new pain  - Anticipate increased pain with activity and pre-medicate as appropriate  Outcome: Progressing     Problem: RISK FOR INFECTION - ADULT  Goal: Absence of fever/infection during anticipated neutropenic period  Description: INTERVENTIONS  - Monitor WBC  - Administer growth factors as ordered  - Implement neutropenic guidelines  Outcome: Progressing     Problem: DISCHARGE PLANNING  Goal: Discharge to home or other facility with appropriate resources  Description: INTERVENTIONS:  - Identify barriers to discharge w/pt and caregiver  - Include patient/family/discharge partner in discharge planning  - Arrange for needed discharge resources and transportation as appropriate  - Identify discharge learning needs (meds, wound care, etc)  - Arrange for interpreters to assist at discharge as needed  - Consider post-discharge preferences of patient/family/discharge partner  - Complete POLST form as appropriate  - Assess patient's ability to be responsible for managing their own health  - Refer to Case Management Department for coordinating discharge planning if the patient needs post-hospital services based on physician/LIP order or  complex needs related to functional status, cognitive ability or social support system  Outcome: Progressing     Problem: SAFETY ADULT - FALL  Goal: Free from fall injury  Description: INTERVENTIONS:  - Assess pt frequently for physical needs  - Identify cognitive and physical deficits and behaviors that affect risk of falls.  - [place] fall precautions as indicated by assessment.  - Educate pt/family on patient safety including physical limitations  - Instruct pt to call for assistance with activity based on assessment  - Modify environment to reduce risk of injury  - Provide assistive devices as appropriate  - Consider OT/PT consult to assist with strengthening/mobility  - Encourage toileting schedule  Outcome: Progressing     Problem: Patient/Family Goals  Goal: Patient/Family Long Term Goal  Description: Patient's Long Term Goal: to go home    Interventions:  - - Monitor vitals  - Monitor appropriate labs  - Pain management  - Follow MD order  - Diagnostics per order  - Update/Informing patient and family on plan of care  - Discharge planning  - See additional Care Plan goals for specific interventions  Outcome: Progressing  Goal: Patient/Family Short Term Goal  Description: Patient's Short Term Goal: to feel better    Interventions:   - - Monitor vitals  - Monitor appropriate labs  - Pain management  - Follow MD order  - Diagnostics per order  - Update/Informing patient and family on plan of care  - Discharge planning  - See additional Care Plan goals for specific interventions  Outcome: Progressing

## 2024-03-10 NOTE — PLAN OF CARE
Patient is A/0x4, pill endoscopy today, relayed information to night shift RN, multiple loose bowel movements because of the prep, provider nofified, call light in place      Problem: Patient Centered Care  Goal: Patient preferences are identified and integrated in the patient's plan of care  Description: Interventions:  - What would you like us to know as we care for you? From home with family  - Provide timely, complete, and accurate information to patient/family  - Incorporate patient and family knowledge, values, beliefs, and cultural backgrounds into the planning and delivery of care  - Encourage patient/family to participate in care and decision-making at the level they choose  - Honor patient and family perspectives and choices  Outcome: Progressing     Problem: CARDIOVASCULAR - ADULT  Goal: Maintains optimal cardiac output and hemodynamic stability  Description: INTERVENTIONS:  - Monitor vital signs, rhythm, and trends  - Monitor for bleeding, hypotension and signs of decreased cardiac output  - Evaluate effectiveness of vasoactive medications to optimize hemodynamic stability  - Monitor arterial and/or venous puncture sites for bleeding and/or hematoma  - Assess quality of pulses, skin color and temperature  - Assess for signs of decreased coronary artery perfusion - ex. Angina  - Evaluate fluid balance, assess for edema, trend weights  Outcome: Progressing     Problem: RESPIRATORY - ADULT  Goal: Achieves optimal ventilation and oxygenation  Description: INTERVENTIONS:  - Assess for changes in respiratory status  - Assess for changes in mentation and behavior  - Position to facilitate oxygenation and minimize respiratory effort  - Oxygen supplementation based on oxygen saturation or ABGs  - Provide Smoking Cessation handout, if applicable  - Encourage broncho-pulmonary hygiene including cough, deep breathe, Incentive Spirometry  - Assess the need for suctioning and perform as needed  - Assess and instruct  to report SOB or any respiratory difficulty  - Respiratory Therapy support as indicated  - Manage/alleviate anxiety  - Monitor for signs/symptoms of CO2 retention  Outcome: Progressing     Problem: PAIN - ADULT  Goal: Verbalizes/displays adequate comfort level or patient's stated pain goal  Description: INTERVENTIONS:  - Encourage pt to monitor pain and request assistance  - Assess pain using appropriate pain scale  - Administer analgesics based on type and severity of pain and evaluate response  - Implement non-pharmacological measures as appropriate and evaluate response  - Consider cultural and social influences on pain and pain management  - Manage/alleviate anxiety  - Utilize distraction and/or relaxation techniques  - Monitor for opioid side effects  - Notify MD/LIP if interventions unsuccessful or patient reports new pain  - Anticipate increased pain with activity and pre-medicate as appropriate  Outcome: Progressing     Problem: RISK FOR INFECTION - ADULT  Goal: Absence of fever/infection during anticipated neutropenic period  Description: INTERVENTIONS  - Monitor WBC  - Administer growth factors as ordered  - Implement neutropenic guidelines  Outcome: Progressing     Problem: DISCHARGE PLANNING  Goal: Discharge to home or other facility with appropriate resources  Description: INTERVENTIONS:  - Identify barriers to discharge w/pt and caregiver  - Include patient/family/discharge partner in discharge planning  - Arrange for needed discharge resources and transportation as appropriate  - Identify discharge learning needs (meds, wound care, etc)  - Arrange for interpreters to assist at discharge as needed  - Consider post-discharge preferences of patient/family/discharge partner  - Complete POLST form as appropriate  - Assess patient's ability to be responsible for managing their own health  - Refer to Case Management Department for coordinating discharge planning if the patient needs post-hospital services  based on physician/LIP order or complex needs related to functional status, cognitive ability or social support system  Outcome: Progressing     Problem: SAFETY ADULT - FALL  Goal: Free from fall injury  Description: INTERVENTIONS:  - Assess pt frequently for physical needs  - Identify cognitive and physical deficits and behaviors that affect risk of falls.  - [place] fall precautions as indicated by assessment.  - Educate pt/family on patient safety including physical limitations  - Instruct pt to call for assistance with activity based on assessment  - Modify environment to reduce risk of injury  - Provide assistive devices as appropriate  - Consider OT/PT consult to assist with strengthening/mobility  - Encourage toileting schedule  Outcome: Progressing     Problem: Patient/Family Goals  Goal: Patient/Family Long Term Goal  Description: Patient's Long Term Goal: to go home    Interventions:  - - Monitor vitals  - Monitor appropriate labs  - Pain management  - Follow MD order  - Diagnostics per order  - Update/Informing patient and family on plan of care  - Discharge planning  - See additional Care Plan goals for specific interventions  Outcome: Progressing  Goal: Patient/Family Short Term Goal  Description: Patient's Short Term Goal: to feel better    Interventions:   - - Monitor vitals  - Monitor appropriate labs  - Pain management  - Follow MD order  - Diagnostics per order  - Update/Informing patient and family on plan of care  - Discharge planning  - See additional Care Plan goals for specific interventions  Outcome: Progressing

## 2024-03-10 NOTE — PROGRESS NOTES
[facility]  part of Merged with [facility]    Progress Note    Chester Bautista Patient Status:  Inpatient    1940 MRN [id number]   Location [facility]5W Attending Heike Sorto MD   Hosp Day # 16 PCP Heike Sorto MD     Chief Complaint: She is doing okay.    Subjective:     Constitutional:  Positive for activity change and fatigue.   HENT: Negative.     Respiratory: Negative.     Cardiovascular: Negative.    Gastrointestinal: Negative.    Endocrine: Negative.    Genitourinary: Negative.    Allergic/Immunologic: Negative.    Neurological: Negative.    Hematological: Negative.    Psychiatric/Behavioral: Negative.         Objective:   Blood pressure 124/56, pulse 91, temperature 98.5 °F (36.9 °C), temperature source Oral, resp. rate 18, height 5' 1\" (1.549 m), weight 129 lb 6.4 oz (58.7 kg), SpO2 94%.  Physical Exam  Vitals and nursing note reviewed.   Constitutional:       Appearance: Normal appearance.   HENT:      Head: Normocephalic and atraumatic.   Cardiovascular:      Rate and Rhythm: Normal rate and regular rhythm.      Pulses: Normal pulses.      Heart sounds: Normal heart sounds.   Pulmonary:      Effort: Pulmonary effort is normal.      Breath sounds: Normal breath sounds.   Abdominal:      Palpations: Abdomen is soft.   Musculoskeletal:         General: Normal range of motion.      Cervical back: Normal range of motion and neck supple.   Skin:     General: Skin is warm.   Neurological:      Mental Status: She is alert. Mental status is at baseline.         Results:   Lab Results   Component Value Date    WBC 11.9 (H) 03/10/2024    HGB 7.8 (L) 03/10/2024    HCT 25.5 (L) 03/10/2024    .0 03/10/2024    CREATSERUM 0.88 03/10/2024    BUN 16 03/10/2024     03/10/2024    K 3.8 03/10/2024     03/10/2024    CO2 23.0 03/10/2024    GLU 83 03/10/2024    CA 8.4 (L) 03/10/2024    ALB 3.4 2024    ALKPHO 63 2024    BILT 0.2 2024    TP 6.4  02/24/2024    AST 18 02/24/2024    ALT <7 (L) 02/24/2024    PTT 69.9 (H) 02/28/2024    INR 0.93 12/14/2023    TSH 0.622 10/20/2023    DDIMER 1.60 (H) 02/23/2024    ESRML 91 (H) 09/14/2023    CRP 1.10 (H) 09/14/2023    MG 1.9 03/09/2024    TROPHS 9 02/23/2024    B12 748 10/20/2023       No results found.        Assessment & Plan:      PE (pulmonary thromboembolism) (HCC) from right lower lobe pulmonary artery to multiple segmental arteries on CT 2-23-24.   CT without evidence of RV strain.  Without acute cor pulmonale.  Doppler negative for DVT on 2-23-24.  Due to blood loss anemia  N Status post IVC filter March 7, 2024, eliquis on hold     ABLA (acute blood loss anemia)  Hemoglobin noted ,history of AVMs.  Status post endoscopy.  Multiple AVMs have been cauterized with second EGD 2-28-24:,  Continue with  octreotide awaiting approval Sandostatin.  Continue PPI continue to monitor hemoglobin she might need evaluation at [facility]       Type 2 diabetes mellitus without retinopathy (HCC)  Continue with Accu-Chek continuous sliding scale insulin hypoglycemia           Hypertension  Controlled continue with current medical      Chronic obstructive pulmonary disease, unspecified (HCC)  Stable.  Nebs as needed.  Pulmonary following patient.            elevated white count.  May be due to GI bleed.  Improved compared to yesterday no source of infection        Hyperkalemia.   Resolved.        DARWIN.   Improved        UTI.  On IV antibiotics.  Urine culture positive for Klebsiella aerogenes sensitive to ceftriaxone.  Finished IV antibiotics.  DC'ed.         RA.  Stable on prednisone and hydroxychloroquine.  Follows up with rheum as an outpatient.         Hyperlipidemia.  On statin.        Abnormal CT chest.  With bilateral fibrosis and cystic changes.  Pulmonary following patient.         Urinary retention.   Alonso inserted.  And follow-up with urology in 1 week.         Hypomagnesemia.  Supplement and monitor.   Resolved        Dizzy, orthostatic positive  Fall precautions.  Improved       Nosebleed .   Resolved.         Diarrhea.   resolved       VARINDER LATHAM MD  3/10/2024

## 2024-03-10 NOTE — OPERATIVE REPORT
VIDEO CAPSULE ENDOSCOPY REPORT    Chester Bautista     1940 Age 84 year old   PCP Heike Sorto MD Gastroenterologist Zoila Mcqueen MD     Date of procedure: 3/9/2024    Pre-operative diagnosis: recurrent BUZZ    Post-operative diagnosis: see impression    Procedure:  Informed consent was obtained from the patient after the risks of the procedure were discussed, including but not limited to aspiration, missed lesion, and capsule retention. After discussions of the risks/benefits and alternatives to this procedure, the patient signed consent. A standard 8 hour video capsule was administered after the patient prepped with a bowel laxative.     Findings:   1. Stomach entry (1st gastric image): 00:00:19  2. The pillcam stayed in the stomach for the duration of the 8 hour study, consistent with delayed gastric emptying. Food debris presents in stomach.     Impression:  1. Incomplete small bowel study because of delayed gastric emptying, pillcam retained in stomach for duration of study     Recommendations:  1. Continue octreotide and iron replacement for medical management of chronic GI AVM disease, she may require intermittent transfusions for symptomatic anemia  2. Considerations for outpatient academic evaluation   3. Confirm pill expulsion, can obtain KUB in 2 weeks if not visualized.   4. Hgb stable at this time, no objections to discharge from my stance with follow-up in clinic.

## 2024-03-11 ENCOUNTER — TELEPHONE (OUTPATIENT)
Facility: CLINIC | Age: 84
End: 2024-03-11

## 2024-03-11 PROBLEM — I26.99 OTHER PULMONARY EMBOLISM WITHOUT ACUTE COR PULMONALE (HCC): Status: ACTIVE | Noted: 2024-03-09

## 2024-03-11 PROBLEM — Q27.30 AVM (ARTERIOVENOUS MALFORMATION) (HCC): Status: ACTIVE | Noted: 2024-03-11

## 2024-03-11 PROBLEM — D62 ACUTE ON CHRONIC BLOOD LOSS ANEMIA: Status: ACTIVE | Noted: 2024-02-26

## 2024-03-11 LAB
ALBUMIN SERPL-MCNC: 3.3 G/DL (ref 3.2–4.8)
ALBUMIN/GLOB SERPL: 1.1 {RATIO} (ref 1–2)
ALP LIVER SERPL-CCNC: 64 U/L
ALT SERPL-CCNC: 40 U/L
ANION GAP SERPL CALC-SCNC: 6 MMOL/L (ref 0–18)
AST SERPL-CCNC: 52 U/L (ref ?–34)
BASOPHILS # BLD AUTO: 0.04 X10(3) UL (ref 0–0.2)
BASOPHILS NFR BLD AUTO: 0.4 %
BILIRUB SERPL-MCNC: 0.2 MG/DL (ref 0.2–1.1)
BUN BLD-MCNC: 23 MG/DL (ref 9–23)
BUN/CREAT SERPL: 21.3 (ref 10–20)
CALCIUM BLD-MCNC: 8.4 MG/DL (ref 8.7–10.4)
CHLORIDE SERPL-SCNC: 108 MMOL/L (ref 98–112)
CO2 SERPL-SCNC: 24 MMOL/L (ref 21–32)
CREAT BLD-MCNC: 1.08 MG/DL
DEPRECATED RDW RBC AUTO: 58.4 FL (ref 35.1–46.3)
EGFRCR SERPLBLD CKD-EPI 2021: 50 ML/MIN/1.73M2 (ref 60–?)
EOSINOPHIL # BLD AUTO: 0.22 X10(3) UL (ref 0–0.7)
EOSINOPHIL NFR BLD AUTO: 2.1 %
ERYTHROCYTE [DISTWIDTH] IN BLOOD BY AUTOMATED COUNT: 17.1 % (ref 11–15)
GLOBULIN PLAS-MCNC: 2.9 G/DL (ref 2.8–4.4)
GLUCOSE BLD-MCNC: 201 MG/DL (ref 70–99)
GLUCOSE BLDC GLUCOMTR-MCNC: 210 MG/DL (ref 70–99)
GLUCOSE BLDC GLUCOMTR-MCNC: 211 MG/DL (ref 70–99)
GLUCOSE BLDC GLUCOMTR-MCNC: 258 MG/DL (ref 70–99)
GLUCOSE BLDC GLUCOMTR-MCNC: 313 MG/DL (ref 70–99)
HCT VFR BLD AUTO: 25.2 %
HGB BLD-MCNC: 7.6 G/DL
IMM GRANULOCYTES # BLD AUTO: 0.15 X10(3) UL (ref 0–1)
IMM GRANULOCYTES NFR BLD: 1.4 %
LYMPHOCYTES # BLD AUTO: 1.49 X10(3) UL (ref 1–4)
LYMPHOCYTES NFR BLD AUTO: 14.2 %
MCH RBC QN AUTO: 28.9 PG (ref 26–34)
MCHC RBC AUTO-ENTMCNC: 30.2 G/DL (ref 31–37)
MCV RBC AUTO: 95.8 FL
MONOCYTES # BLD AUTO: 1.08 X10(3) UL (ref 0.1–1)
MONOCYTES NFR BLD AUTO: 10.3 %
NEUTROPHILS # BLD AUTO: 7.55 X10 (3) UL (ref 1.5–7.7)
NEUTROPHILS # BLD AUTO: 7.55 X10(3) UL (ref 1.5–7.7)
NEUTROPHILS NFR BLD AUTO: 71.6 %
OSMOLALITY SERPL CALC.SUM OF ELEC: 295 MOSM/KG (ref 275–295)
PLATELET # BLD AUTO: 266 10(3)UL (ref 150–450)
POTASSIUM SERPL-SCNC: 4 MMOL/L (ref 3.5–5.1)
POTASSIUM SERPL-SCNC: 4.1 MMOL/L (ref 3.5–5.1)
PROT SERPL-MCNC: 6.2 G/DL (ref 5.7–8.2)
RBC # BLD AUTO: 2.63 X10(6)UL
SODIUM SERPL-SCNC: 138 MMOL/L (ref 136–145)
WBC # BLD AUTO: 10.5 X10(3) UL (ref 4–11)

## 2024-03-11 PROCEDURE — 99233 SBSQ HOSP IP/OBS HIGH 50: CPT | Performed by: INTERNAL MEDICINE

## 2024-03-11 PROCEDURE — 99232 SBSQ HOSP IP/OBS MODERATE 35: CPT | Performed by: REGISTERED NURSE

## 2024-03-11 PROCEDURE — 99232 SBSQ HOSP IP/OBS MODERATE 35: CPT | Performed by: PHYSICIAN ASSISTANT

## 2024-03-11 RX ORDER — GABAPENTIN 100 MG/1
100 CAPSULE ORAL ONCE
Status: COMPLETED | OUTPATIENT
Start: 2024-03-11 | End: 2024-03-11

## 2024-03-11 RX ORDER — INSULIN DEGLUDEC 100 U/ML
20 INJECTION, SOLUTION SUBCUTANEOUS NIGHTLY
Status: DISCONTINUED | OUTPATIENT
Start: 2024-03-12 | End: 2024-03-13

## 2024-03-11 RX ORDER — CARVEDILOL 12.5 MG/1
12.5 TABLET ORAL 2 TIMES DAILY WITH MEALS
Qty: 60 TABLET | Refills: 0 | Status: SHIPPED | OUTPATIENT
Start: 2024-03-11

## 2024-03-11 RX ORDER — MIDODRINE HYDROCHLORIDE 2.5 MG/1
2.5 TABLET ORAL 3 TIMES DAILY
Qty: 90 TABLET | Refills: 0 | Status: SHIPPED | OUTPATIENT
Start: 2024-03-11

## 2024-03-11 NOTE — TELEPHONE ENCOUNTER
[facility] called for diagnosis code for Sandostatin.  Please call.

## 2024-03-11 NOTE — PROGRESS NOTES
[facility]     Gastroenterology Progress Note    Chester Bautista Patient Status:  Inpatient    1940 MRN [id number]   Location [facility]5W Attending Heike Sorto MD   Hosp Day # 17 PCP Heike Sorto MD       Subjective:   No dizziness, SOB, fatigue, N/V, black/bloody stools.  Tolerating diet  No fever, chills  Objective:   Blood pressure 117/52, pulse 84, temperature 98.6 °F (37 °C), temperature source Oral, resp. rate 20, height 5' 1\" (1.549 m), weight 129 lb 6.4 oz (58.7 kg), SpO2 99%. Body mass index is 24.45 kg/m².    Gen: awake, alert patient, NAD  HEENT: EOMI, the sclera appears anicteric, oropharynx clear, mucus membranes appear moist  CV: RRR  Lung: no conversational dyspnea   Abdomen: soft NTND abdomen with NABS appreciated   Skin: dry, warm, no jaundice  Ext: no LE edema is evident  Neuro: Alert, oriented x4 and interactive  Psych: calm, cooperative    Assessment and Plan:   Chester Bautista is a a(n) 84 year old female w/ a history of breast cancer s/p lumpectomy and radiation, rheumatoid arthritis, COPD, HTN, HLD, DM, who presented with shortness of breath found to have new PE started on XARELTO this admission. GI consulted for acute on chronic anemia.     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 Resumed on XARELTO with recurrence of symptomatic anemia.  -IVC filter placed and xarelto stopped given recurrence of symptomatic anemia  -VCE attempted 3/9/2024 but pillcam remained in stomach for duration of the 8 hours study with incomplete small bowel study.  Continue to monitor for expulsion of pillcam, can obtain KUB in 2 weeks if not visualized  -Hgb stable at 7.8 but below baseline of ~10.  Recommend transfusing 1 unit PRBCs prior to discharge as Pt may require intermittent transfusions for symptomatic anemia  -PA approved for monthly sandostatin LAR  IM.    Recommend:  -Monitor for pillcam expulsion, consider KUB in 2 weeks if not visualized  -Sandostatin 20mg LAR IM x06mpmf, order OP  -Transfuse 1 unit PRBCs prior to discharge  -Follow up in primary clinic in 1 week for Hgb check, may require intermittent transfusions for symptomatic anemia    Case discussed with Zoila Mcqueen MD and Makayla RN.    Vera Thompson DNP, FNP-[facility]  3/11/2024      Results:     Lab Results   Component Value Date    WBC 11.9 (H) 03/10/2024    HGB 7.8 (L) 03/10/2024    HCT 25.5 (L) 03/10/2024    .0 03/10/2024    CREATSERUM 0.88 03/10/2024    BUN 16 03/10/2024     03/10/2024    K 4.1 03/11/2024     03/10/2024    CO2 23.0 03/10/2024    GLU 83 03/10/2024    CA 8.4 (L) 03/10/2024    ALB 3.4 02/24/2024    ALKPHO 63 02/24/2024    BILT 0.2 02/24/2024    TP 6.4 02/24/2024    AST 18 02/24/2024    ALT <7 (L) 02/24/2024    PTT 69.9 (H) 02/28/2024    INR 0.93 12/14/2023    TSH 0.622 10/20/2023    DDIMER 1.60 (H) 02/23/2024    ESRML 91 (H) 09/14/2023    CRP 1.10 (H) 09/14/2023    MG 1.9 03/09/2024    B12 748 10/20/2023       No results found.

## 2024-03-11 NOTE — DIETARY NOTE
ADULT NUTRITION REASSESSMENT    Pt is at moderate nutrition risk.  Pt does not meet malnutrition criteria.      RECOMMENDATIONS TO MD: See Nutrition Intervention    ADMITTING DIAGNOSIS:  Other pulmonary embolism without acute cor pulmonale, unspecified chronicity (HCC) [I26.99]  PERTINENT PAST MEDICAL HISTORY:   Past Medical History:   Diagnosis Date    Anxiety state     Cancer (HCC)     breast cancer 2018    Chronic obstructive pulmonary disease, unspecified (HCC) 05/04/2023    Chronic obstructive pulmonary disease, unspecified (HCC) 05/04/2023    COPD (chronic obstructive pulmonary disease) (HCC)     Diabetes (HCC)     Essential hypertension     Exposure to medical diagnostic radiation     High blood pressure     High cholesterol     History of blood transfusion 07/2022    low hemoglobin    Osteoarthritis     PONV (postoperative nausea and vomiting)     Pulmonary embolism (HCC)     Pulmonary emphysema (HCC)     Rheumatoid arthritis (HCC)      PATIENT STATUS: Initial 02/27/24: Pt admit for pulmonary embolism with acute cor pulmonale. PMH sig for COPD, T2DM, Breast Cancer s/p lumpectomy and radiation (2016) and others noted above. Pt assessed due to consult received for loss of appetite and weight loss. Pt screened at no nutrition risk at admission by RN. Chart reviewed, pt previously admitted for hypokalemia and pneumonia vs pulmonary edema chest x-ray about 2 weeks ago. Since discharge from previous admission, pt noted to have shortness of breath with exertion. Pt noted with acute pulmonary embolism. Pt NPO this morning for EGD with push enteroscopy - moderate amount of solid food in stomach obscuring visualization, procedure aborted with plan to retry tomorrow. Pt currently on clear liquid diet (CLD). Pt visited, no family at bedside. Pt reports relatively good intake. Typically eats 2 meals a day. Pt reports used to drink 2 Ensures daily but has not in awhile as eating better and would rely on them as meals  instead of cooking. Pt reports weight loss over last week despite no changes in appetite or intake. Usual body weight (UBW) about 142#. Current weight 132# 12.8 oz (standing scale). EMR weight review, last known weight prior to admission (PTA) 143# 3 oz on 2/16/24 - 10.4# or 7.2% weight loss x 11 days (significant). Per EMR weight review, pt noted weighing 148# on 11/24/23 - 15.2 or 10.3% weight loss x 3 months (significant) and 143# 13 oz on 8/14/23 - 11# or 7.6% weight loss x 6 months (non-significant). Nutrition focused physical exam (NFPE) completed, see below for details. Encouraged adequate energy and protein intake when solid diet resumed. Pt reports she does laps around her house for exercise. Discussed adding oral nutritional supplement (ONS) to help maximize nutrition, pt in agreement and provided preferences. +ONS per discussion   Update 03/04/24: RA completed per protocol. Chart reviewed, diet advanced off CLD s/p EGD. Pt currently on Carb controlled diet;1.5 gram Na, low Potassium diet. Repeat EGD completed 2/28 with multiple gastric and duodenal AVMs. Discussion with RN, eating well - not drinking ONS (pt dislikes). Pt receiving potassium pills daily. Reviewed diet, liberalized Cardiac and Low Potassium Restriction. Pt with variable intake since last visit - averaging 70% overall (good). Pt visited, feels pretty good, nervous about receiving benson as does not want long term. Pt reports not liking flavor of Ensure Clear but open to Vanilla Ensure Compact daily. Adjusted ONS per discussion.     Update 03/11/24: RA completed per protocol. Chart reviewed, benson placed 3/4. Capsule endoscopy completed 3/9 - incomplete small bowel study r/t delayed gastric emptying, pillcam retained in stomach. Discussion with RN, no concerns voiced. Pt visited, daughter at bedside. Pt reports appetite is fine, drinks Ensure Compact as PM snack - 2 noted at bedside. Pt denies nausea and reports no concerns.  CPM    FOOD/NUTRITION RELATED HISTORY:  Appetite: Fair to good   Intake: ~% x10 meals documented since last visit - averaging 72% overall (good). Breakfast tray at bedside (cereal), fair intake. Pt reports drinking ONS  Intake Meeting Needs: Marginal, monitor need for oral nutrition supplements (ONS), Adjusteed ONS (Dislikes Ensure Clear, eating relatively well)  Percent Meals Eaten (last 6 days)       Date/Time Percent Meals Eaten (%)    03/05/24 0913 50 %    03/06/24 0901 30 %    03/06/24 1300 75 %    03/07/24 1334 100 %    03/08/24 0816 75 %    03/08/24 1540 50 %    03/10/24 0900 50 %    03/10/24 1445 90 %    03/10/24 1846 --     Percent Meals Eaten (%): meal at bedside at 03/10/24 1846          Food Allergies: No Known Food Allergies (NKFA)  Cultural/Ethnic/Religion Preferences: Not Obtained    GASTROINTESTINAL: +BM 3/10/2024 and constipation  U/O 1450 ml (1.0 ml/kg/hr) - adequate    MEDICATIONS: reviewed Oral Torsemide daily  Electrolyte replacement per protocol (cardiac)   carvedilol  12.5 mg Oral BID with meals    insulin degludec  15 Units Subcutaneous Nightly    midodrine  2.5 mg Oral TID    vancomycin  125 mg Oral Daily    pantoprazole  40 mg Oral BID AC    octreoTIDe  100 mcg Subcutaneous Q8H    tamsulosin  0.4 mg Oral Daily    insulin aspart  1-7 Units Subcutaneous TID CC    cyclobenzaprine  10 mg Oral Nightly    fluticasone furoate-vilanterol  1 puff Inhalation Daily    glycopyrrolate  1 mg Oral TID    hydroxychloroquine  200 mg Oral Daily    cetirizine  5 mg Oral Daily    montelukast  10 mg Oral Nightly    predniSONE  5 mg Oral Daily    rosuvastatin  10 mg Oral Nightly    torsemide  20 mg Oral Daily     LABS: reviewed A1c 9.5% on 2/2/24   POC Glucose reviewed  Hyponatremia and Hypomagnesemia resolved  Recent Labs     03/08/24  0637 03/09/24  0602 03/10/24  0603 03/11/24  0542 03/11/24  0946   *  --  83  --  201*   BUN 25*  --  16  --  23   CREATSERUM 1.04*  --  0.88  --  1.08*   CA 8.3*   --  8.4*  --  8.4*   MG 1.5* 1.9  --   --   --      --  142  --  138   K 4.1  --  3.8 4.1 4.0     --  111  --  108   CO2 24.0  --  23.0  --  24.0   OSMOCALC 294  --  294  --  295     NUTRITION RELATED PHYSICAL FINDINGS:  - Nutrition Focused Physical Exam (NFPE): no wasting noted   - Fluid Accumulation: non-pitting Right Lower extremity and Foot  see RN documentation for details  - Skin Integrity: intact see RN documentation for details    ANTHROPOMETRICS:  HT: 154.9 cm (5' 1\")  WT: 58.7 kg (129 lb 6.4 oz) - standing scale, no new weight since 2/29/24 - monitor  Admit Wt: 137# on 2/23/24  BMI: Body mass index is 24.45 kg/m².  BMI CLASSIFICATION: 25-29.9 kg/m2 - overweight  IBW: 105 lbs        126% IBW  Usual Body Wt: 142 lbs per pt      94% UBW    WEIGHT HISTORY:  Patient Weight(s) for the past 336 hrs:   Weight   02/29/24 0819 58.7 kg (129 lb 6.4 oz)   02/27/24 1242 60.2 kg (132 lb 12.8 oz)   02/27/24 0650 60.2 kg (132 lb 12.8 oz)     Wt Readings from Last 10 Encounters:   02/29/24 58.7 kg (129 lb 6.4 oz)   02/23/24 62.1 kg (137 lb)   02/16/24 65 kg (143 lb 3.2 oz)   02/15/24 64.4 kg (142 lb)   02/04/24 64.1 kg (141 lb 4.8 oz)   02/02/24 65.9 kg (145 lb 3.2 oz)   12/21/23 66.7 kg (147 lb)   12/14/23 66.7 kg (147 lb)   12/07/23 66.9 kg (147 lb 8 oz)   12/01/23 66.7 kg (147 lb)     NUTRITION DIAGNOSIS/PROBLEM:   Unintended wt loss related to Decreased ability to consume sufficient energy as evidenced by significant weight loss noted over last  few weeks    NUTRITION DIAGNOSIS PROGRESS:  Improvement (unresolved)    NUTRITION INTERVENTION:   NUTRITION PRESCRIPTION:   Estimated Nutrition needs: --dosing wt of 60.2 kg - wt taken on 2/27/2024  Calories: 7435-0318 calories/day (25-30 calories per kg Dosing wt)  Protein: 60-72 g protein/day (1.0-1.2 g protein/kg Dosing wt)    - Diet:       Procedures    Carbohydrate controlled diet    Regular/General diet Calorie Restriction/Carb Controlled: 1800 kcal/60 grams;  Sodium Restriction: 1.5 GM NA; Texture Consistency: Regular; Is Patient on Accuchecks? Yes; Is Patient on Suicide Precautions? No    Carbohydrate controlled diet      - RD Care Plan: Encouraged increased PO intake and Initiated ONS (oral nutritional supplements)  - Meals and snacks: Encouraged increased PO intake  - Medical Food Supplements-RD continued Ensure Compact (220 calories/ 9 g protein each) Daily Vanilla. Rational/use of oral supplements discussed.  - Vitamin and mineral supplements: none  - Feeding assistance: meal set up  - Nutrition education: Discussed importance of adequate energy and protein intake     - Coordination of nutrition care: collaboration with other providers  - Discharge and transfer of nutrition care to new setting or provider: monitor plans Plan Home with HH    MONITOR AND EVALUATE/NUTRITION GOALS:  - Food and Nutrient Intake:      Monitor: adequacy of PO intake and adequacy of supplement intake  - Food and Nutrient Administration:      Monitor: N/A  - Anthropometric Measurement:    Monitor weight  - Nutrition Goals:      maintain wt within 5%, PO and supplement greater than 75% of needs, good supplement intake, labs within acceptable limits, minimize lean body mass loss, prevent skin breakdown, and support body systems    DIETITIAN FOLLOW UP: RD to follow and monitor nutrition status    Rosio Robison MS, GUANACO, RDN, LDN  [id number]

## 2024-03-11 NOTE — TELEPHONE ENCOUNTER
Called [facility], [phone], spoke w/ pharmacist, Cheo    Pt's name//rx verified.    Informed pharmacist that GI AVM disease (Q27.33) as the diagnosis for sandostatin/ocreotide.    Cheo stated they received order for Ocreotide 100 mcg q 8hrs, quantity of 90 w/ no refill, med is non-formulary and I told him that this is approved.     He also asked if med should be prefilled or in single doses.

## 2024-03-11 NOTE — PLAN OF CARE
No complaints overnight. On room air. Call light in reach.  Problem: Patient Centered Care  Goal: Patient preferences are identified and integrated in the patient's plan of care  Description: Interventions:  - What would you like us to know as we care for you? From home with family  - Provide timely, complete, and accurate information to patient/family  - Incorporate patient and family knowledge, values, beliefs, and cultural backgrounds into the planning and delivery of care  - Encourage patient/family to participate in care and decision-making at the level they choose  - Honor patient and family perspectives and choices  Outcome: Progressing     Problem: CARDIOVASCULAR - ADULT  Goal: Maintains optimal cardiac output and hemodynamic stability  Description: INTERVENTIONS:  - Monitor vital signs, rhythm, and trends  - Monitor for bleeding, hypotension and signs of decreased cardiac output  - Evaluate effectiveness of vasoactive medications to optimize hemodynamic stability  - Monitor arterial and/or venous puncture sites for bleeding and/or hematoma  - Assess quality of pulses, skin color and temperature  - Assess for signs of decreased coronary artery perfusion - ex. Angina  - Evaluate fluid balance, assess for edema, trend weights  Outcome: Progressing     Problem: RESPIRATORY - ADULT  Goal: Achieves optimal ventilation and oxygenation  Description: INTERVENTIONS:  - Assess for changes in respiratory status  - Assess for changes in mentation and behavior  - Position to facilitate oxygenation and minimize respiratory effort  - Oxygen supplementation based on oxygen saturation or ABGs  - Provide Smoking Cessation handout, if applicable  - Encourage broncho-pulmonary hygiene including cough, deep breathe, Incentive Spirometry  - Assess the need for suctioning and perform as needed  - Assess and instruct to report SOB or any respiratory difficulty  - Respiratory Therapy support as indicated  - Manage/alleviate  anxiety  - Monitor for signs/symptoms of CO2 retention  Outcome: Progressing     Problem: PAIN - ADULT  Goal: Verbalizes/displays adequate comfort level or patient's stated pain goal  Description: INTERVENTIONS:  - Encourage pt to monitor pain and request assistance  - Assess pain using appropriate pain scale  - Administer analgesics based on type and severity of pain and evaluate response  - Implement non-pharmacological measures as appropriate and evaluate response  - Consider cultural and social influences on pain and pain management  - Manage/alleviate anxiety  - Utilize distraction and/or relaxation techniques  - Monitor for opioid side effects  - Notify MD/LIP if interventions unsuccessful or patient reports new pain  - Anticipate increased pain with activity and pre-medicate as appropriate  Outcome: Progressing     Problem: RISK FOR INFECTION - ADULT  Goal: Absence of fever/infection during anticipated neutropenic period  Description: INTERVENTIONS  - Monitor WBC  - Administer growth factors as ordered  - Implement neutropenic guidelines  Outcome: Progressing     Problem: SAFETY ADULT - FALL  Goal: Free from fall injury  Description: INTERVENTIONS:  - Assess pt frequently for physical needs  - Identify cognitive and physical deficits and behaviors that affect risk of falls.  - Hospers fall precautions as indicated by assessment.  - Educate pt/family on patient safety including physical limitations  - Instruct pt to call for assistance with activity based on assessment  - Modify environment to reduce risk of injury  - Provide assistive devices as appropriate  - Consider OT/PT consult to assist with strengthening/mobility  - Encourage toileting schedule  Outcome: Progressing     Problem: Patient/Family Goals  Goal: Patient/Family Long Term Goal  Description: Patient's Long Term Goal: to go home    Interventions:  - - Monitor vitals  - Monitor appropriate labs  - Pain management  - Follow MD order  -  Diagnostics per order  - Update/Informing patient and family on plan of care  - Discharge planning  - See additional Care Plan goals for specific interventions  Outcome: Progressing  Goal: Patient/Family Short Term Goal  Description: Patient's Short Term Goal: to feel better    Interventions:   - - Monitor vitals  - Monitor appropriate labs  - Pain management  - Follow MD order  - Diagnostics per order  - Update/Informing patient and family on plan of care  - Discharge planning  - See additional Care Plan goals for specific interventions  Outcome: Progressing     Problem: DISCHARGE PLANNING  Goal: Discharge to home or other facility with appropriate resources  Description: INTERVENTIONS:  - Identify barriers to discharge w/pt and caregiver  - Include patient/family/discharge partner in discharge planning  - Arrange for needed discharge resources and transportation as appropriate  - Identify discharge learning needs (meds, wound care, etc)  - Arrange for interpreters to assist at discharge as needed  - Consider post-discharge preferences of patient/family/discharge partner  - Complete POLST form as appropriate  - Assess patient's ability to be responsible for managing their own health  - Refer to Case Management Department for coordinating discharge planning if the patient needs post-hospital services based on physician/LIP order or complex needs related to functional status, cognitive ability or social support system  Outcome: Progressing

## 2024-03-11 NOTE — PHYSICAL THERAPY NOTE
PHYSICAL THERAPY TREATMENT NOTE - INPATIENT     Room Number: 521/521-A       Presenting Problem: patient admitted from home w/ SOB, found to have russell acute PE (Doppler for BLE negative for DVT, started on IV heparin)  Co-Morbidities : RA, breast cancer s/p lumpectomy 2016, COPD on RA, DM    Problem List  Principal Problem:    Acute pulmonary embolism (HCC)  Active Problems:    Type 2 diabetes mellitus without retinopathy (HCC)    Anemia, iron deficiency    Dyslipidemia    Hypertension    Infiltrating ductal carcinoma of right breast, stage 1 (HCC)    Rheumatoid arthritis (HCC)    AVM (arteriovenous malformation) of duodenum, acquired    Chronic obstructive pulmonary disease, unspecified (HCC)    VTE (venous thromboembolism)    ABLA (acute blood loss anemia)    Hyperkalemia    DARWIN (acute kidney injury) (HCC)    Hypomagnesemia    Constipation    Urinary retention    AVM (arteriovenous malformation) of duodenum, acquired with hemorrhage    Orthostatic dizziness    Gastric AVM    PE (pulmonary thromboembolism) (HCC)      PHYSICAL THERAPY ASSESSMENT   Patient demonstrates good  progress this session, goals  remain in progress.    Patient continues to function at baseline with bed mobility, transfers, gait, and stair negotiation.  Contributing factors to remaining limitations include medical status.  Next session anticipate patient to progress bed mobility, transfers, gait, and stair negotiation.  Physical Therapy will continue to follow patient for duration of hospitalization.    Patient continues to benefit from continued skilled PT services: at discharge to promote functional independence in home.  Anticipate patient will return home with home health PT.    PLAN  PT Treatment Plan: Bed mobility;Body mechanics;Patient education;Gait training;Stair training;Balance training;Transfer training  Frequency (Obs): 3-5x/week    SUBJECTIVE  \"I want to leave today\"    OBJECTIVE  Precautions: Bed/chair alarm    PAIN ASSESSMENT    Ratin          BALANCE  Static Sitting: Good  Dynamic Sitting: Fair +  Static Standing: Fair  Dynamic Standing: Fair -    AM-PAC '6-Clicks' INPATIENT SHORT FORM - BASIC MOBILITY  How much difficulty does the patient currently have...  Patient Difficulty: Turning over in bed (including adjusting bedclothes, sheets and blankets)?: None   Patient Difficulty: Sitting down on and standing up from a chair with arms (e.g., wheelchair, bedside commode, etc.): None   Patient Difficulty: Moving from lying on back to sitting on the side of the bed?: None   How much help from another person does the patient currently need...   Help from Another: Moving to and from a bed to a chair (including a wheelchair)?: A Little   Help from Another: Need to walk in hospital room?: A Little   Help from Another: Climbing 3-5 steps with a railing?: A Little     AM-PAC Score:  Raw Score: 21   Approx Degree of Impairment: 28.97%   Standardized Score (AM-PAC Scale): 50.25   CMS Modifier (G-Code): CJ    FUNCTIONAL ABILITY STATUS  Functional Mobility/Gait Assessment  Gait Assistance: Supervision  Distance (ft): 200ft  Assistive Device: Rolling walker  Pattern: Within Functional Limits  Stairs: Stairs  How Many Stairs: 13  Device: 1 Rail  Assist: Supervision  Pattern: Ascend and Descend  Ascend and Descend :  (alternating patten when descending and single step when ascending, seated break with two steps left, which patient does at baseline when home)  Rolling:  not tested   Supine to Sit: independent  Sit to Supine:  not tested   Sit to Stand: independent    Additional information: Patient on room air. Patient currently with SBA to mod I for mobility during the session with rolling walker. Patient able to navigate 12 stairs with one handrail with alternating pattern when descending and single step pattern when ascending, seated rest break for about 1 minute on stairs. Patient states she does this when home. Patient with no concerns for home,  eager to leave. The patient's Approx Degree of Impairment: 28.97% has been calculated based on documentation in the [facility] '6 clicks' Inpatient Daily Activity Short Form.  Research supports that patients with this level of impairment may benefit from home. Patient received supine in bed, agreeable to physical therapy. Education with patient provided verbally on physical therapy plan of care and physiological benefits of out of bed mobility. Patient with good carryover during session. Anticipated therapy needs remain appropriate based on the patient's performance, personal factors, and remaining functional impairments. Final disposition will be made by interdisciplinary medical team.    Patient End of Session: Up in chair;Needs met;Call light within reach;RN aware of session/findings;All patient questions and concerns addressed;Alarm set    CURRENT GOALS   Goals to be met by: 3.5.24  Patient Goal Patient's self-stated goal is: Home w/ dtr   Goal #1 Patient is able to demonstrate supine - sit EOB @ level: independent      Goal #1   Current Status Goal met    Goal #2 Patient is able to demonstrate transfers Sit to/from Stand at assistance level: supervision with walker - rolling      Goal #2  Current Status Goal met    Goal #3 Patient is able to ambulate 50 feet with assist device: walker - rolling at assistance level: supervision   Goal #3   Current Status Goal met    Goal #4 Patient will negotiate 14 stairs/one curb w/ assistive device and supervision   Goal #4   Current Status SBA to CGA 13 stairs    Goal #5 Patient to demonstrate independence with home activity/exercise instructions provided to patient in preparation for discharge.   Goal #5   Current Status Goal met      Gait Training: 10 minutes  Therapeutic Activity: 13 minutes

## 2024-03-11 NOTE — TELEPHONE ENCOUNTER
Pharmacy is calling in regards to Rx for Octreotide.  He wanted to make sure MD was aware that pt is also taking glipizide and both medication will decrease Blood sugar levels.  Please call

## 2024-03-11 NOTE — TELEPHONE ENCOUNTER
Spoke w/ BILL Boateng and discussed below.     She stated plan is for pt to receive Ocreotide 100 mcg q 8hrs subcutaneous at home and pt/daughter had been educated on how to give the shot.    She stated pt should get the prefilled syringes and agrees w/ dx code below, pt has gastric and duodenal AVM and chronic anemia.    I informed her that I will follow up w/ pt's insurance on the PA.

## 2024-03-11 NOTE — TELEPHONE ENCOUNTER
Dr. Garcia,     Please advise when you can see pt, unsure if 3/15/24 3pm is ok.    There is a standing order for CBC by Dr. Sorto.     Do you want another CBC prior to her appt w/ you?     Thank you.

## 2024-03-11 NOTE — PROGRESS NOTES
[facility]  part of [facility]    Progress Note    Chester Bautista Patient Status:  Inpatient    1940 MRN [id number]   Location [facility]5W Attending Heike Sorto MD   Hosp Day # 17 PCP Heike Sorto MD     Subjective:   Seen and examined while resting in bed. Feels better. No complaints. No shortness of breath, chest pain, or dizziness. On room air.    Objective:   Blood pressure 117/52, pulse 84, temperature 98.6 °F (37 °C), temperature source Oral, resp. rate 20, height 5' 1\" (1.549 m), weight 129 lb 6.4 oz (58.7 kg), SpO2 99%.  Physical Exam  Vitals and nursing note reviewed.   Constitutional:       General: She is awake. She is not in acute distress.     Appearance: Normal appearance. She is not ill-appearing.   HENT:      Head: Normocephalic and atraumatic.   Cardiovascular:      Rate and Rhythm: Normal rate and regular rhythm.   Pulmonary:      Effort: Pulmonary effort is normal. No respiratory distress.      Breath sounds: No wheezing, rhonchi or rales.      Comments: Slightly diminished breath sounds bilaterally  Abdominal:      General: There is no distension.      Palpations: Abdomen is soft.      Tenderness: There is no abdominal tenderness.   Musculoskeletal:      Cervical back: Normal range of motion and neck supple.      Right lower leg: No edema.      Left lower leg: No edema.   Skin:     General: Skin is warm and dry.   Neurological:      General: No focal deficit present.      Mental Status: She is alert and oriented to person, place, and time.   Psychiatric:         Mood and Affect: Mood normal.         Behavior: Behavior is cooperative.       Results:   Lab Results   Component Value Date    WBC 11.9 (H) 03/10/2024    HGB 7.8 (L) 03/10/2024    HCT 25.5 (L) 03/10/2024    .0 03/10/2024    CREATSERUM 0.88 03/10/2024    BUN 16 03/10/2024     03/10/2024    K 4.1 2024     03/10/2024    CO2 23.0 03/10/2024    GLU 83  03/10/2024    CA 8.4 (L) 03/10/2024    ALB 3.4 02/24/2024    ALKPHO 63 02/24/2024    BILT 0.2 02/24/2024    TP 6.4 02/24/2024    AST 18 02/24/2024    ALT <7 (L) 02/24/2024    PTT 69.9 (H) 02/28/2024    INR 0.93 12/14/2023    TSH 0.622 10/20/2023    DDIMER 1.60 (H) 02/23/2024    ESRML 91 (H) 09/14/2023    CRP 1.10 (H) 09/14/2023    MG 1.9 03/09/2024    TROPHS 9 02/23/2024    B12 748 10/20/2023       Assessment & Plan:   Acute pulmonary embolism  CTA chest with RLL PE, low clot burden and no major central obstruction  No CT evidence of RH strain  LE Doppler US no DVT  Recent hospitalization may be main risk factor  History of breast cancer  C/b recurrent GI bleeding with AVMs while on anticoagulation  S/p IVC filter 3/7  Hemodynamically stable and no hypoxemia  Plan:  -Okay to discharge from pulmonary perspective  -Remain off anticoagulation for now, consider low-dose anticoagulation in mid to long-term     Acute on chronic anemia  GI bleeding  Recurrent GI bleeding with AVMs  Worse in setting of anticoagulation initiation  S/p EGD with cauterization of multiple AVMs  S/p capsule endoscopy - incomplete d/t delayed gastric emptying and pill retained in stomach  Hemoglobin relatively stable at 7.8  Plan:  -Follow hemoglobin  -PPI and octreotide as per GI     Abnormal CT chest  History of scarring, bronchiectasis, pneumatoceles, recent pneumonia, and question of prior history of sarcoidosis  Plan:  -Follow-up CT chest in 4-6 weeks  -Follow-up with Dr. Parker in 4 weeks    Respiratory status stable. Will sign off.    Mendoza Brewer PA-C  Pulmonary Medicine  3/11/2024

## 2024-03-11 NOTE — TELEPHONE ENCOUNTER
Called pt's insurance [employer] pharmacy review dept,  ([phone]) to follow up on PA for Sandostatin/Ocreotide, spoke w/ PETRA Ramos specialist.     Pt's name//rx disclosed.     Rachel stated Ocreotide 100 mcg subcutaneous q 8 hrs approved on 3/7/24 from 24-24,  PA # [id number], quantity 100 ml.    Sandostatin 20 mg LAR approved (after she changed the quantity limit to 1 per 30 days), approved from 24-24 PA # [id number].    Call reference # [id number]. I requested approval letter to be faxed to us.     I called Trisha Bentley and informed/updated of approval below.     She was asking if pt can get the med at [facility], she called them and was told that we need to place order in therapy plan for the 20 mg Sandostatin dose and they can give if to the pt. Trisha stated pt agrees to receive Sandostatin monthly at [facility].    Sandostatin/Ocreotide is not on the therapy plan list, I called [facility], spoke w/ Mary Alice. I was instructed to fax order to them.     Sandosatin 20 mg order w/ TE (that indicates dx for the med) faxed to [facility] [phone].    Successful fax confirmation received.

## 2024-03-11 NOTE — PROGRESS NOTES
[facility]  part of [facility]    Progress Note    Chester Bautista Patient Status:  Inpatient    1940 MRN [id number]   Location [facility]5W Attending Heike Sorto MD   Hosp Day # 17 PCP Heike Sorto MD     Chief Complaint: GIB.     Subjective:     Constitutional:  Positive for activity change and fatigue. Negative for appetite change, chills, diaphoresis, fever and unexpected weight change.   HENT:  Negative for nosebleeds and trouble swallowing. Mouth sores: This AM..   Respiratory:  Negative for apnea, cough, choking, chest tightness, shortness of breath, wheezing and stridor.    Cardiovascular:  Negative for chest pain, palpitations and leg swelling.   Gastrointestinal:  Positive for diarrhea (3 BM but soft.). Negative for heartburn, nausea, vomiting, abdominal pain, constipation (Last BM> 48 hrs. Not eatting much due to NPO.), blood in stool, abdominal distention, anal bleeding and rectal pain.        2 Bm this am and brownish nonbloody.    Endocrine: Negative for cold intolerance, heat intolerance, polydipsia, polyphagia and polyuria.   Genitourinary:  Negative for bladder incontinence, dysuria, urgency, frequency, hematuria, flank pain and difficulty urinating.   Musculoskeletal:  Myalgias: Radiates up when lays head down on bed. Pillow at home is different. Had Ct done because was worse..   Neurological:  Positive for dizziness, weakness and light-headedness. Negative for tremors, seizures, syncope, facial asymmetry, speech difficulty, numbness and headaches.        Per patient she usually gets lightheaded at home.  She is careful from laying to sitting she waits a couple minutes.     Psychiatric/Behavioral:  Negative for suicidal ideas, hallucinations, behavioral problems, confusion, sleep disturbance, self-injury, decreased concentration, agitation and depressed mood. The patient is not nervous/anxious and is not hyperactive.    All other systems  Monitor Summary:  SR-ST          reviewed and are negative.      Objective:   Blood pressure 117/52, pulse 84, temperature 98.6 °F (37 °C), temperature source Oral, resp. rate 20, height 5' 1\" (1.549 m), weight 129 lb 6.4 oz (58.7 kg), SpO2 99%.  Physical Exam  Vitals and nursing note reviewed.   Constitutional:       General: She is not in acute distress.     Appearance: Normal appearance. She is well-groomed. She is not ill-appearing, toxic-appearing or diaphoretic.      Interventions: She is not intubated.  Eyes:      General: No scleral icterus.  Cardiovascular:      Rate and Rhythm: Normal rate and regular rhythm.      Pulses: Normal pulses.   Pulmonary:      Effort: No tachypnea, bradypnea, accessory muscle usage, prolonged expiration, respiratory distress or retractions. She is not intubated.      Breath sounds: No stridor, decreased air movement or transmitted upper airway sounds. No decreased breath sounds, wheezing, rhonchi or rales.   Chest:      Chest wall: No tenderness.   Abdominal:      General: Bowel sounds are normal. There is no distension.      Palpations: Abdomen is soft.      Tenderness: There is no abdominal tenderness. There is no right CVA tenderness, left CVA tenderness, guarding or rebound. Negative signs include Hung's sign.   Musculoskeletal:      Right lower leg: No edema.      Left lower leg: No edema.   Neurological:      Mental Status: She is alert and oriented to person, place, and time.   Psychiatric:         Mood and Affect: Mood normal.         Behavior: Behavior is cooperative.         Results:   Lab Results   Component Value Date    WBC 11.9 (H) 03/10/2024    HGB 7.8 (L) 03/10/2024    HCT 25.5 (L) 03/10/2024    .0 03/10/2024    CREATSERUM 0.88 03/10/2024    BUN 16 03/10/2024     03/10/2024    K 4.1 03/11/2024     03/10/2024    CO2 23.0 03/10/2024    GLU 83 03/10/2024    CA 8.4 (L) 03/10/2024    ALB 3.4 02/24/2024    ALKPHO 63 02/24/2024    BILT 0.2 02/24/2024    TP 6.4 02/24/2024    AST 18  02/24/2024    ALT <7 (L) 02/24/2024    PTT 69.9 (H) 02/28/2024    INR 0.93 12/14/2023    TSH 0.622 10/20/2023    DDIMER 1.60 (H) 02/23/2024    ESRML 91 (H) 09/14/2023    CRP 1.10 (H) 09/14/2023    MG 1.9 03/09/2024    TROPHS 9 02/23/2024    B12 748 10/20/2023       No results found.        Assessment & Plan:     PE (pulmonary thromboembolism) (HCC) from right lower lobe pulmonary artery to multiple segmental arteries on CT 2-23-24.   CT without evidence of RV strain.  Without acute cor pulmonale.  Doppler negative for DVT on 2-23-24.   Not sure if due to hospitalization from prior.  Did have SCDs on from prior hospitalization.  Not sure of duration whether will be 6 months or longer due to history of breast cancer.  Discussed with pulmonary critical care.  Status post IVC filter March 7, 2024.  Hold Eliquis.      Type 2 diabetes mellitus without retinopathy (HCC)  Sugars are higher.  Off oral hypoglycemics while in hospital.  Sliding scale insulin, hypoglycemic protocol, Accu-Cheks.  Increase Levemir. Monitor.       Hypertension  Generally well-controlled but on the lower limits of normal.  Spironolactone and losartan are being held due to hyperkalemia.  Stop nifedipine.  Will decrease Coreg.  Monitor.  Added midodrine.      Chronic obstructive pulmonary disease, unspecified (HCC)  Stable.  Nebs as needed.  Pulmonary following patient.      ABLA (acute blood loss anemia)  History of AVMs.  Status post endoscopy.  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 Multiple gastric and duodenal AVMs. She recently underwent cautery of gastric and proximal duodenal avms in two months ago and now multiple new ones have grown. Suspect she will have chronic AVM disease given its natural course. Utility of prophylactic endoscopic treatment is not clear as she will continue to grow new ones. Suggest medical management,.).  GI following patient.  Hemoglobin improved status posttransfusion 1 unit  PRBCs.  Monitor back on Eliquis.  On PPI.  Slight drop in hemoglobin noted this morning.  But no evidence of more blood loss.  Has not had any bowel movement.  May be hydration effect?  Will monitor.  Slight drop but last time this happened 1 recheck was normal.  Not sure if some of that lab?  But even more of a decline today than yesterday.  Had a little bit of a nosebleed this morning.  Has not had a bowel movement for 48 hours.  But last bowel movement she now mentions that it was black.  GI reconsulted.  On PPI.  Monitor hemoglobin.  Slight drop. Small amount with epistaxis yetserday and Bm brownish today after no BM X 72 hrs. GI reconsulted.  Status post IVC filter yesterday March 7, 2024.  Slight drop today also in hemoglobin.  Will monitor.       Elevated white count.  May be due to GI bleed.  But more elevated than yesterday but overall decreased from initial admission.  Check UA.  Denies any respiratory symptoms.  On steroids but stable dose from prior.  Slight worsening.  Urine culture negative to date.  Chest x-ray shows no acute disease.  But now with diarrhea.   C diff negative. Improving.        Hyperkalemia.  Will hold potassium and spironolactone and losartan.  Low potassium diet.  Will monitor.  Will give some Kayexalate.  Renal to be consulted.  Resolved.       DARWIN.   Not sure if due to dehydration.  Stop diuretics.  Check bladder scan and renal ultrasound and UA.  Avoid nephrotoxins.  Monitor.  Getting light dose of IV fluids.  Was n.p.o. for EGD.  Plus appetite is diminished.  Check postvoid residual.  Last diet load approximately 6 days ago.  Improving.  Avoid nephrotoxins.  Continue to hold diuretics.  No evidence of fluid overload.  Slightly improved today.        UTI.  On IV antibiotics.  Urine culture positive for Klebsiella aerogenes sensitive to ceftriaxone.  Finished IV antibiotics.  DC'ed.        RA.  Stable on prednisone and hydroxychloroquine.  Follows up with rheum as an  outpatient.        Hyperlipidemia.  On statin.       Abnormal CT chest.  With bilateral fibrosis and cystic changes.  Pulmonary following patient.        Urinary retention.  Added Flomax.  Straight cath protocol.  Urology consulted.  Patient unwilling to do straight cath and daughter does not want to do.  Explained to patient.  Alonso inserted.  And follow-up with urology in 1 week.        Hypomagnesemia.  Supplement and monitor.        Dizzy, orthostatic positive  Fall precautions.  Light dose of IV fluids due to history of CHF.  Added midodrine.  Discussed about from laying to sitting wait a couple minutes before standing and also from sitting to standing wait a couple minutes before beginning walking.  Parameters on blood pressure medicines.  And monitor.  Physical therapy did not do stairs due to dizziness. Sill with lightheadedness when gets up.  S/P 1 unit PRBC's.  Stopped nifedipine completely.  Will decrease Coreg.  Monitor. Resolved.          Diarrhea.  MiraLAX was stopped 48 hours ago.  No other agents to cause diarrhea.  C diff negative. Resolved.     DVT prophylaxis off Eliquis due to GI bleed.  On SCDs.  Has IVC filter.  GI prophylaxis on PPI.  CODE STATUS full.  Discussed with patient.  DW daughter Brea diallo on phone with pt. permission.   MAGGIE RN taking care of pt.      **Certification      PHYSICIAN Certification of Need for Inpatient Hospitalization - Initial Certification    Patient will require inpatient services that will reasonably be expected to span two midnight's based on the clinical documentation in H+P.   Based on patients current state of illness, I anticipate that, after discharge, patient will require TBD.    Heike Sorto MD  3/11/2024

## 2024-03-11 NOTE — TELEPHONE ENCOUNTER
Received approval letter for Sandostatin 100 mcg and 20 mg, please see below, fax sent for scanning.

## 2024-03-11 NOTE — DISCHARGE INSTRUCTIONS
Sometimes managing your health at home requires assistance.  The [facility] team has recognized your preference to use [facility].  They can be reached by phone at [phone].  The fax number for your reference is [phone].  A representative from the home health agency will contact you or your family to resume your visits.

## 2024-03-11 NOTE — PLAN OF CARE
Pt a&o x4, room air, on tele, rolling walker x1. No reports of pain. PT worked with patient to do stairs this afternoon. Plan is to discharge home when Octreotide can be delivered. Safety precautions maintained. Call light in reach.       Problem: Patient Centered Care  Goal: Patient preferences are identified and integrated in the patient's plan of care  Description: Interventions:  - What would you like us to know as we care for you? From home with family  - Provide timely, complete, and accurate information to patient/family  - Incorporate patient and family knowledge, values, beliefs, and cultural backgrounds into the planning and delivery of care  - Encourage patient/family to participate in care and decision-making at the level they choose  - Honor patient and family perspectives and choices  Outcome: Progressing     Problem: CARDIOVASCULAR - ADULT  Goal: Maintains optimal cardiac output and hemodynamic stability  Description: INTERVENTIONS:  - Monitor vital signs, rhythm, and trends  - Monitor for bleeding, hypotension and signs of decreased cardiac output  - Evaluate effectiveness of vasoactive medications to optimize hemodynamic stability  - Monitor arterial and/or venous puncture sites for bleeding and/or hematoma  - Assess quality of pulses, skin color and temperature  - Assess for signs of decreased coronary artery perfusion - ex. Angina  - Evaluate fluid balance, assess for edema, trend weights  Outcome: Progressing     Problem: RESPIRATORY - ADULT  Goal: Achieves optimal ventilation and oxygenation  Description: INTERVENTIONS:  - Assess for changes in respiratory status  - Assess for changes in mentation and behavior  - Position to facilitate oxygenation and minimize respiratory effort  - Oxygen supplementation based on oxygen saturation or ABGs  - Provide Smoking Cessation handout, if applicable  - Encourage broncho-pulmonary hygiene including cough, deep breathe, Incentive Spirometry  - Assess the  need for suctioning and perform as needed  - Assess and instruct to report SOB or any respiratory difficulty  - Respiratory Therapy support as indicated  - Manage/alleviate anxiety  - Monitor for signs/symptoms of CO2 retention  Outcome: Progressing     Problem: PAIN - ADULT  Goal: Verbalizes/displays adequate comfort level or patient's stated pain goal  Description: INTERVENTIONS:  - Encourage pt to monitor pain and request assistance  - Assess pain using appropriate pain scale  - Administer analgesics based on type and severity of pain and evaluate response  - Implement non-pharmacological measures as appropriate and evaluate response  - Consider cultural and social influences on pain and pain management  - Manage/alleviate anxiety  - Utilize distraction and/or relaxation techniques  - Monitor for opioid side effects  - Notify MD/LIP if interventions unsuccessful or patient reports new pain  - Anticipate increased pain with activity and pre-medicate as appropriate  Outcome: Progressing     Problem: RISK FOR INFECTION - ADULT  Goal: Absence of fever/infection during anticipated neutropenic period  Description: INTERVENTIONS  - Monitor WBC  - Administer growth factors as ordered  - Implement neutropenic guidelines  Outcome: Progressing     Problem: SAFETY ADULT - FALL  Goal: Free from fall injury  Description: INTERVENTIONS:  - Assess pt frequently for physical needs  - Identify cognitive and physical deficits and behaviors that affect risk of falls.  - [place] fall precautions as indicated by assessment.  - Educate pt/family on patient safety including physical limitations  - Instruct pt to call for assistance with activity based on assessment  - Modify environment to reduce risk of injury  - Provide assistive devices as appropriate  - Consider OT/PT consult to assist with strengthening/mobility  - Encourage toileting schedule  Outcome: Progressing     Problem: Patient/Family Goals  Goal: Patient/Family Long Term  Goal  Description: Patient's Long Term Goal: to go home    Interventions:  - - Monitor vitals  - Monitor appropriate labs  - Pain management  - Follow MD order  - Diagnostics per order  - Update/Informing patient and family on plan of care  - Discharge planning  - See additional Care Plan goals for specific interventions  Outcome: Progressing  Goal: Patient/Family Short Term Goal  Description: Patient's Short Term Goal: to feel better    Interventions:   - - Monitor vitals  - Monitor appropriate labs  - Pain management  - Follow MD order  - Diagnostics per order  - Update/Informing patient and family on plan of care  - Discharge planning  - See additional Care Plan goals for specific interventions  Outcome: Progressing     Problem: DISCHARGE PLANNING  Goal: Discharge to home or other facility with appropriate resources  Description: INTERVENTIONS:  - Identify barriers to discharge w/pt and caregiver  - Include patient/family/discharge partner in discharge planning  - Arrange for needed discharge resources and transportation as appropriate  - Identify discharge learning needs (meds, wound care, etc)  - Arrange for interpreters to assist at discharge as needed  - Consider post-discharge preferences of patient/family/discharge partner  - Complete POLST form as appropriate  - Assess patient's ability to be responsible for managing their own health  - Refer to Case Management Department for coordinating discharge planning if the patient needs post-hospital services based on physician/LIP order or complex needs related to functional status, cognitive ability or social support system  Outcome: Progressing

## 2024-03-11 NOTE — CM/SW NOTE
03/11/24 1100   Discharge disposition   Expected discharge disposition Home-Health   Post Acute Care Provider   ([facility])   Home services after discharge Senior services;Meals on Wheels   Discharge transportation Private car     RACHAEL confirmed with RN Lisa who stated pt is medically ready for discharge today.  Pt has discharge order.    Pt cleared to discharge home after she works with therapy on stairs.    Pt needed CBC lab draw 3/14- SW entered face to face for home health nurse (pt was current with PT/OT only prior to admission),     Orders/updates attached them via Aidin to [facility].  Marlen from [facility] made aware of discharge through [employer]idin Messages.    RACHAEL confirmed that [facility] contact information added to AVS.    RACHAEL made referral to [facility] 3/1 (defer to [facility]) to assess pt for care giving/meals on wheels.         Update 12:00 PM    Octreotide prior authorization approved.    RACHAEL spoke to Brianne from [facility] who stated the earliest medication can be delivered to pt home is 3/13.    RACHAEL confirmed co-pay.    RACHAEL clarified with [facility] pharmacist Carlee ICD 10 code.    [facility]: [phone]    PLAN: DC home with [facility] pending speciality medication delivery    Heike Guerrier MSW, LSW [id number]

## 2024-03-12 ENCOUNTER — TELEPHONE (OUTPATIENT)
Dept: INTERNAL MEDICINE CLINIC | Facility: CLINIC | Age: 84
End: 2024-03-12

## 2024-03-12 VITALS
TEMPERATURE: 99 F | BODY MASS INDEX: 24.43 KG/M2 | OXYGEN SATURATION: 96 % | RESPIRATION RATE: 16 BRPM | SYSTOLIC BLOOD PRESSURE: 143 MMHG | HEIGHT: 61 IN | HEART RATE: 81 BPM | WEIGHT: 129.38 LBS | DIASTOLIC BLOOD PRESSURE: 69 MMHG

## 2024-03-12 LAB
ANION GAP SERPL CALC-SCNC: 7 MMOL/L (ref 0–18)
ANTIBODY SCREEN: NEGATIVE
BASOPHILS # BLD AUTO: 0.04 X10(3) UL (ref 0–0.2)
BASOPHILS NFR BLD AUTO: 0.3 %
BUN BLD-MCNC: 28 MG/DL (ref 9–23)
BUN/CREAT SERPL: 23.5 (ref 10–20)
CALCIUM BLD-MCNC: 8.7 MG/DL (ref 8.7–10.4)
CHLORIDE SERPL-SCNC: 106 MMOL/L (ref 98–112)
CO2 SERPL-SCNC: 26 MMOL/L (ref 21–32)
CREAT BLD-MCNC: 1.19 MG/DL
DEPRECATED RDW RBC AUTO: 57.4 FL (ref 35.1–46.3)
EGFRCR SERPLBLD CKD-EPI 2021: 45 ML/MIN/1.73M2 (ref 60–?)
EOSINOPHIL # BLD AUTO: 0.17 X10(3) UL (ref 0–0.7)
EOSINOPHIL NFR BLD AUTO: 1.3 %
ERYTHROCYTE [DISTWIDTH] IN BLOOD BY AUTOMATED COUNT: 17.2 % (ref 11–15)
GLUCOSE BLD-MCNC: 144 MG/DL (ref 70–99)
GLUCOSE BLDC GLUCOMTR-MCNC: 130 MG/DL (ref 70–99)
GLUCOSE BLDC GLUCOMTR-MCNC: 427 MG/DL (ref 70–99)
HCT VFR BLD AUTO: 24.6 %
HCT VFR BLD AUTO: 31.3 %
HGB BLD-MCNC: 10.5 G/DL
HGB BLD-MCNC: 7.8 G/DL
IMM GRANULOCYTES # BLD AUTO: 0.16 X10(3) UL (ref 0–1)
IMM GRANULOCYTES NFR BLD: 1.3 %
LYMPHOCYTES # BLD AUTO: 1.92 X10(3) UL (ref 1–4)
LYMPHOCYTES NFR BLD AUTO: 15.2 %
MCH RBC QN AUTO: 29.5 PG (ref 26–34)
MCHC RBC AUTO-ENTMCNC: 31.7 G/DL (ref 31–37)
MCV RBC AUTO: 93.2 FL
MONOCYTES # BLD AUTO: 1.13 X10(3) UL (ref 0.1–1)
MONOCYTES NFR BLD AUTO: 8.9 %
NEUTROPHILS # BLD AUTO: 9.23 X10 (3) UL (ref 1.5–7.7)
NEUTROPHILS # BLD AUTO: 9.23 X10(3) UL (ref 1.5–7.7)
NEUTROPHILS NFR BLD AUTO: 73 %
OSMOLALITY SERPL CALC.SUM OF ELEC: 296 MOSM/KG (ref 275–295)
PLATELET # BLD AUTO: 298 10(3)UL (ref 150–450)
POTASSIUM SERPL-SCNC: 3.9 MMOL/L (ref 3.5–5.1)
RBC # BLD AUTO: 2.64 X10(6)UL
RH BLOOD TYPE: POSITIVE
SODIUM SERPL-SCNC: 139 MMOL/L (ref 136–145)
WBC # BLD AUTO: 12.7 X10(3) UL (ref 4–11)

## 2024-03-12 PROCEDURE — 99238 HOSP IP/OBS DSCHRG MGMT 30/<: CPT | Performed by: INTERNAL MEDICINE

## 2024-03-12 RX ORDER — HEPARIN SODIUM (PORCINE) LOCK FLUSH IV SOLN 100 UNIT/ML 100 UNIT/ML
500 SOLUTION INTRAVENOUS ONCE
Status: COMPLETED | OUTPATIENT
Start: 2024-03-12 | End: 2024-03-12

## 2024-03-12 RX ORDER — GABAPENTIN 100 MG/1
100 CAPSULE ORAL NIGHTLY
Qty: 90 CAPSULE | Refills: 3 | Status: SHIPPED | OUTPATIENT
Start: 2024-03-12

## 2024-03-12 RX ORDER — GABAPENTIN 100 MG/1
100 CAPSULE ORAL NIGHTLY
Status: DISCONTINUED | OUTPATIENT
Start: 2024-03-12 | End: 2024-03-13

## 2024-03-12 RX ORDER — SODIUM CHLORIDE 9 MG/ML
INJECTION, SOLUTION INTRAVENOUS ONCE
Status: COMPLETED | OUTPATIENT
Start: 2024-03-12 | End: 2024-03-12

## 2024-03-12 NOTE — PLAN OF CARE
Pt a&o x4, room air, on tele, rolling walker x1. No reports of pain. 1 unit of blood given this afternoon. Octreotide injection given as well prior to discharge. Plan is to discharge home later this evening post blood transfusion. Alonso will remain in place until follow up appointment with urology. Discharge education given to patient and granddaughter. Safety precautions maintained. Call light in reach.       Problem: Patient Centered Care  Goal: Patient preferences are identified and integrated in the patient's plan of care  Description: Interventions:  - What would you like us to know as we care for you? From home with family  - Provide timely, complete, and accurate information to patient/family  - Incorporate patient and family knowledge, values, beliefs, and cultural backgrounds into the planning and delivery of care  - Encourage patient/family to participate in care and decision-making at the level they choose  - Honor patient and family perspectives and choices  Outcome: Progressing     Problem: CARDIOVASCULAR - ADULT  Goal: Maintains optimal cardiac output and hemodynamic stability  Description: INTERVENTIONS:  - Monitor vital signs, rhythm, and trends  - Monitor for bleeding, hypotension and signs of decreased cardiac output  - Evaluate effectiveness of vasoactive medications to optimize hemodynamic stability  - Monitor arterial and/or venous puncture sites for bleeding and/or hematoma  - Assess quality of pulses, skin color and temperature  - Assess for signs of decreased coronary artery perfusion - ex. Angina  - Evaluate fluid balance, assess for edema, trend weights  Outcome: Progressing     Problem: RESPIRATORY - ADULT  Goal: Achieves optimal ventilation and oxygenation  Description: INTERVENTIONS:  - Assess for changes in respiratory status  - Assess for changes in mentation and behavior  - Position to facilitate oxygenation and minimize respiratory effort  - Oxygen supplementation based on oxygen  saturation or ABGs  - Provide Smoking Cessation handout, if applicable  - Encourage broncho-pulmonary hygiene including cough, deep breathe, Incentive Spirometry  - Assess the need for suctioning and perform as needed  - Assess and instruct to report SOB or any respiratory difficulty  - Respiratory Therapy support as indicated  - Manage/alleviate anxiety  - Monitor for signs/symptoms of CO2 retention  Outcome: Progressing     Problem: PAIN - ADULT  Goal: Verbalizes/displays adequate comfort level or patient's stated pain goal  Description: INTERVENTIONS:  - Encourage pt to monitor pain and request assistance  - Assess pain using appropriate pain scale  - Administer analgesics based on type and severity of pain and evaluate response  - Implement non-pharmacological measures as appropriate and evaluate response  - Consider cultural and social influences on pain and pain management  - Manage/alleviate anxiety  - Utilize distraction and/or relaxation techniques  - Monitor for opioid side effects  - Notify MD/LIP if interventions unsuccessful or patient reports new pain  - Anticipate increased pain with activity and pre-medicate as appropriate  Outcome: Progressing     Problem: RISK FOR INFECTION - ADULT  Goal: Absence of fever/infection during anticipated neutropenic period  Description: INTERVENTIONS  - Monitor WBC  - Administer growth factors as ordered  - Implement neutropenic guidelines  Outcome: Progressing     Problem: SAFETY ADULT - FALL  Goal: Free from fall injury  Description: INTERVENTIONS:  - Assess pt frequently for physical needs  - Identify cognitive and physical deficits and behaviors that affect risk of falls.  - [place] fall precautions as indicated by assessment.  - Educate pt/family on patient safety including physical limitations  - Instruct pt to call for assistance with activity based on assessment  - Modify environment to reduce risk of injury  - Provide assistive devices as appropriate  -  Consider OT/PT consult to assist with strengthening/mobility  - Encourage toileting schedule  Outcome: Progressing     Problem: Patient/Family Goals  Goal: Patient/Family Long Term Goal  Description: Patient's Long Term Goal: to go home    Interventions:  - - Monitor vitals  - Monitor appropriate labs  - Pain management  - Follow MD order  - Diagnostics per order  - Update/Informing patient and family on plan of care  - Discharge planning  - See additional Care Plan goals for specific interventions  Outcome: Progressing  Goal: Patient/Family Short Term Goal  Description: Patient's Short Term Goal: to feel better    Interventions:   - - Monitor vitals  - Monitor appropriate labs  - Pain management  - Follow MD order  - Diagnostics per order  - Update/Informing patient and family on plan of care  - Discharge planning  - See additional Care Plan goals for specific interventions  Outcome: Progressing     Problem: DISCHARGE PLANNING  Goal: Discharge to home or other facility with appropriate resources  Description: INTERVENTIONS:  - Identify barriers to discharge w/pt and caregiver  - Include patient/family/discharge partner in discharge planning  - Arrange for needed discharge resources and transportation as appropriate  - Identify discharge learning needs (meds, wound care, etc)  - Arrange for interpreters to assist at discharge as needed  - Consider post-discharge preferences of patient/family/discharge partner  - Complete POLST form as appropriate  - Assess patient's ability to be responsible for managing their own health  - Refer to Case Management Department for coordinating discharge planning if the patient needs post-hospital services based on physician/LIP order or complex needs related to functional status, cognitive ability or social support system  Outcome: Progressing

## 2024-03-12 NOTE — TELEPHONE ENCOUNTER
Noted pt is scheduled for Sandostatin injection tomorrow-called MAGI Pastor and she confirmed this.     Called [facility], spoke w/ Teresa and then she transferred me to Luis M, pharmacist    Pt's name//Rx disclosed    Informed pharmacist that pt will be getting the Sandostatin at [facility], she stated she will cancel the med.     No further action required, TE closed.

## 2024-03-12 NOTE — DISCHARGE SUMMARY
DISCHARGE SUMMARY     Chester Bautista Patient Status:  Inpatient    1940 MRN [id number]   Location [facility]5W Attending Heike Sorto MD   Hosp Day # 18 PCP Heike Sorto MD     Date of Admission: 2024  Date of Discharge:  3-12-24  Discharge Disposition: Home with .     Admitting Diagnosis:   Other pulmonary embolism without acute cor pulmonale, unspecified chronicity (HCC) [I26.99]  GIB    Hospital Discharge Diagnoses: Same.     TCM Diagnosis at discharge from Hospital: Other: GIB; still recommend for TCM follow-up    Please note that only patients enrolled in the Medicare ACO, BCBS ACO and BCBS HMOs will be handled by a member of the Care Management Team.  For all other patients, please follow usual protocol for discharge care transition.    Lace+ Score: 83  59-90 High Risk  29-58 Medium Risk  0-28   Low Risk.    Risk of readmission: Chester Bautista has High Risk of readmission after discharge from the hospital.    History of Present Illness:     Chester Bautista is a(n) 84 year old lady with past medical history of rheumatoid arthritis, breast cancer status post lumpectomy and radiation in 2016, COPD, type II DM, HLD, HTN, anemia with bleeding AVMs who presented to the emergency room with difficulty breathing.     Patient was admitted 2 weeks prior to presentation with complaints of shortness of breath.  CXR at that time showed opacities consistent with pneumonia versus heart failure and the patient was discharged on antibiotics.  Patient's symptoms did not improve and she continues to have shortness of breath with exertion specially when undergoing PT.  Her O2 saturation dropped down to the 80s patient reported that walking back to her car from iron transfusion her O2 saturation dropped to the low 80s which prompted her to come to the emergency room.  No fever or chills.     CT showed acute pulmonary embolism.  Bilateral lower extremity ultrasound was  negative for DVT.  Pulmonology were consulted.  Patient was started on IV heparin drip and admitted to the floor for further assessment and management  PE (pulmonary thromboembolism) (HCC) from right lower lobe pulmonary artery to multiple segmental arteries on CT 2-23-24.   CT without evidence of RV strain.  Without acute cor pulmonale.  Doppler negative for DVT on 2-23-24.   Not sure if due to hospitalization from prior.  Did have SCDs on from prior hospitalization.  Not sure of duration whether will be 6 months or longer due to history of breast cancer.  Discussed with pulmonary critical care.  Status post IVC filter March 7, 2024.  Hold Eliquis.       Type 2 diabetes mellitus without retinopathy (HCC)  Sugars are higher.  Off oral hypoglycemics while in hospital.  Sliding scale insulin, hypoglycemic protocol, Accu-Cheks.  Increase Levemir. Monitor.        Hypertension  Generally well-controlled but on the lower limits of normal.  Spironolactone and losartan are being held due to hyperkalemia.  Stop nifedipine.  Will decrease Coreg.  Monitor.  Added midodrine.       Chronic obstructive pulmonary disease, unspecified (HCC)  Stable.  Nebs as needed.  Pulmonary following patient.       ABLA (acute blood loss anemia)  History of AVMs.  Status post endoscopy.  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 Multiple gastric and duodenal AVMs. She recently underwent cautery of gastric and proximal duodenal avms in two months ago and now multiple new ones have grown. Suspect she will have chronic AVM disease given its natural course. Utility of prophylactic endoscopic treatment is not clear as she will continue to grow new ones. Suggest medical management,.).  GI following patient.  Hemoglobin improved status posttransfusion 1 unit PRBCs.  Monitor back on Eliquis.  On PPI.  Slight drop in hemoglobin noted this morning.  But no evidence of more blood loss.  Has not had any bowel movement.   May be hydration effect?  Will monitor.  Slight drop but last time this happened 1 recheck was normal.  Not sure if some of that lab?  But even more of a decline today than yesterday.  Had a little bit of a nosebleed this morning.  Has not had a bowel movement for 48 hours.  But last bowel movement she now mentions that it was black.  GI reconsulted.  On PPI.  Monitor hemoglobin.  Slight drop. Small amount with epistaxis yetserday and Bm brownish today after no BM X 72 hrs. GI reconsulted.  Status post IVC filter yesterday March 7, 2024.  Slight drop today also in hemoglobin.  Will monitor.  Transfusion done prior to discharge.  She will need to follow-up with an academic institution for further follow-up regarding   AVM's.           Elevated white count.  May be due to GI bleed.  But more elevated than yesterday but overall decreased from initial admission.  Check UA.  Denies any respiratory symptoms.  On steroids but stable dose from prior.  Slight worsening.  Urine culture negative to date.  Chest x-ray shows no acute disease.  But now with diarrhea.   C diff negative. Improving.         Hyperkalemia.  Will hold potassium and spironolactone and losartan.  Low potassium diet.  Will monitor.  Will give some Kayexalate.  Renal to be consulted.  Resolved.        DARWIN.   Not sure if due to dehydration.  Stop diuretics.  Check bladder scan and renal ultrasound and UA.  Avoid nephrotoxins.  Monitor.  Getting light dose of IV fluids.  Was n.p.o. for EGD.  Plus appetite is diminished.  Check postvoid residual.  Last diet load approximately 6 days ago.  Improving.  Avoid nephrotoxins.  Continue to hold diuretics.  No evidence of fluid overload.  Slightly improved today.         UTI.  On IV antibiotics.  Urine culture positive for Klebsiella aerogenes sensitive to ceftriaxone.  Finished IV antibiotics.  DC'ed.         RA.  Stable on prednisone and hydroxychloroquine.  Follows up with rheum as an outpatient.          Hyperlipidemia.  On statin.        Abnormal CT chest.  With bilateral fibrosis and cystic changes.  Pulmonary following patient.         Urinary retention.  Added Flomax.  Straight cath protocol.  Urology consulted.  Patient unwilling to do straight cath and daughter does not want to do.  Explained to patient.  Alonso inserted.  And follow-up with urology in 1 week.         Hypomagnesemia.  Supplement and monitor.         Dizzy, orthostatic positive  Fall precautions.  Light dose of IV fluids due to history of CHF.  Added midodrine.  Discussed about from laying to sitting wait a couple minutes before standing and also from sitting to standing wait a couple minutes before beginning walking.  Parameters on blood pressure medicines.  And monitor.  Physical therapy did not do stairs due to dizziness. Sill with lightheadedness when gets up.  S/P 1 unit PRBC's.  Stopped nifedipine completely.  Will decrease Coreg.  Monitor. Resolved.            Diarrhea.  MiraLAX was stopped 48 hours ago.  No other agents to cause diarrhea.  C diff negative. Resolved.      DVT prophylaxis off Eliquis due to GI bleed.  On SCDs.  Has IVC filter.  GI prophylaxis on PPI.  CODE STATUS full.  Discussed with patient.  MAGGIE daughter Brea diallo on phone with pt. permission.   MAGGIE RN taking care of pt.      Discharge Physical Exam:   General appearance:  alert, appears stated age, and cooperative  Pulmonary: clear to auscultation bilaterally  Cardiovascular: S1, S2 normal  Abdominal: soft, non-tender; bowel sounds normal  Extremities: no cyanosis or edema    Procedures during hospitalization:   Date of procedure: 02/27/24     Procedure: EGD w/control of bleeding     Pre-operative diagnosis: anemia     Post-operative diagnosis: see impression     Medications: MAC     Complications: none     Procedure:  Informed consent was obtained from the patient after the risks of the procedure were discussed, including but not limited to bleeding, perforation,  aspiration, infection, or possibility of a missed lesion. After discussions of the risks/benefits and alternatives to this procedure, as well as the planned sedation, the patient was placed in the left lateral decubitus position and begun on continuous blood pressure pulse oximetry and EKG monitoring and this was maintained throughout the procedure. Once an adequate level of sedation was obtained a bite block was placed. Then the lubricated tip of the [id number] diagnostic video upper endoscope was inserted and advanced using direct visualization into the posterior pharynx and ultimately into the esophagus, stomach.     Complications: None     Findings:       1. Esophagus: Small 2 cm hiatal hernia. Normal esophagus.      2. Stomach: We then entered the stomach. Moderate amount of solid food in stomach obscuring visualization. There were at least two small AVMs in the greater curvature of the stomach, one of them which was spontaneously oozing. Both were treated with 7F argon plasma coagulation using multiple pulses until hemostasis achieved. Hematin staining in stomach. There appeared to be additional AVMs but these were largely obscured by the presence of solid food.   Date of procedure: 02/28/24     Procedure: EGD w/control of bleeding     Pre-operative diagnosis: anemia     Post-operative diagnosis: see impression     Medications: MAC     Complications: none     Procedure:  Informed consent was obtained from the patient after the risks of the procedure were discussed, including but not limited to bleeding, perforation, aspiration, infection, or possibility of a missed lesion. After discussions of the risks/benefits and alternatives to this procedure, as well as the planned sedation, the patient was placed in the left lateral decubitus position and begun on continuous blood pressure pulse oximetry and EKG monitoring and this was maintained throughout the procedure. Once an adequate level of sedation was  obtained a bite block was placed. Then the lubricated tip of the [id number] diagnostic video upper endoscope was inserted and advanced using direct visualization into the posterior pharynx and ultimately into the esophagus, stomach, and duodenum.     Complications: None     Findings:       1. Esophagus: Small 2 cm hiatal hernia. Normal esophagus.       2. Stomach: We then entered the stomach. Bilious clear secretions. Three non-bleeding small AVMs in the greater curvature of stomach, treated with argon plasma coagulation. Recently treated AVM sites seen, clean based ulcers at this time. Retroflexion performed to view the fundus and a patulous cardia.      3. Duodenum: Over 6-7 small to medium sized non-bleeding AVMs in the duodenal bulb. These were left undisturbed.     We then withdrew the instrument from the patient who tolerated the procedure well.      Impression:   1. Multiple gastric and duodenal AVMs. She recently underwent cautery of gastric and proximal duodenal avms in two months ago and now multiple new ones have grown. Suspect she will have chronic AVM disease given its natural course. Utility of prophylactic endoscopic treatment is not clear as she will continue to grow new ones. Suggest medical management,.    Incidental or significant findings and recommendations (brief descriptions):      Lab/Test results pending at Discharge:       Consultants:  GI, Pulm., IR, urology.     Discharge Medication List:     Discharge Medications        START taking these medications        Instructions Prescription details   apixaban 5 MG Tabs  Commonly known as: Eliquis  Start taking on: March 4, 2024      Take 2 tablets (10 mg total) by mouth 2 (two) times daily for 10 days, THEN 1 tablet (5 mg total) 2 (two) times daily.   Quantity: 110 tablet  Refills: 0     midodrine 2.5 MG Tabs  Commonly known as: ProAmatine      Take 1 tablet (2.5 mg total) by mouth in the morning and 1 tablet (2.5 mg total) at noon and 1  tablet (2.5 mg total) in the evening. Hold if BP > 120/80..   Quantity: 90 tablet  Refills: 0     octreoTIDe 100 mcg/mL Soln  Commonly known as: SandoSTATIN      Inject 1 mL (100 mcg total) into the skin every 8 (eight) hours.   Stop taking on: April 5, 2024  Quantity: 90 mL  Refills: 0     tamsulosin 0.4 MG Caps  Commonly known as: Flomax      Take 1 capsule (0.4 mg total) by mouth daily.   Stop taking on: April 4, 2024  Quantity: 30 capsule  Refills: 0            CHANGE how you take these medications        Instructions Prescription details   carvedilol 12.5 MG Tabs  Commonly known as: Coreg  What changed:   medication strength  how much to take      Take 1 tablet (12.5 mg total) by mouth 2 (two) times daily with meals.   Quantity: 60 tablet  Refills: 0            CONTINUE taking these medications        Instructions Prescription details   acetaminophen 500 MG Tabs  Commonly known as: Tylenol Extra Strength      Take 1 tablet (500 mg total) by mouth every 6 (six) hours as needed for Pain.   Refills: 0     albuterol 108 (90 Base) MCG/ACT Aers  Commonly known as: Ventolin HFA      inhale 2 puff by inhalation route  every 4 - 6 hours as needed   Quantity: 1 each  Refills: 5     albuterol 108 (90 Base) MCG/ACT Aers  Commonly known as: Ventolin HFA      inhale 2 puff by inhalation route  every 4 - 6 hours as needed   Quantity: 1 each  Refills: 0     Blood Gluc Meter Disp-Strips Nica      DX E 11.9.  Checks every morning.   Quantity: 100 each  Refills: 11     Blood Glucose System Adrian Kit      DX E 11.9.  Checks every morning.   Quantity: 1 kit  Refills: 0     Blood Glucose Monitoring Suppl w/Device Kit      DX E 11.9.  Checks every morning.   Quantity: 1 kit  Refills: 0     OneTouch Verio w/Device Kit      1 each daily. Test 1x daily   Quantity: 1 kit  Refills: 0     cyclobenzaprine 10 MG Tabs  Commonly known as: Flexeril      Take 1 tablet (10 mg total) by mouth nightly as needed for Muscle spasms.   Quantity: 90  tablet  Refills: 1     fluticasone-salmeterol 250-50 MCG/ACT Aepb  Commonly known as: Wixela Inhub      Inhale 1 puff into the lungs every 12 (twelve) hours.   Quantity: 1 each  Refills: 5     glipiZIDE 5 MG Tabs  Commonly known as: Glucotrol      Take 1.5 tablets (7.5 mg total) by mouth daily with breakfast.   Refills: 0     glycopyrrolate 1 MG Tabs  Commonly known as: Robinul      Take 1 tablet (1 mg total) by mouth 3 (three) times daily.   Quantity: 90 tablet  Refills: 1     hydroxychloroquine 200 MG Tabs  Commonly known as: Plaquenil      Take 1 tablet (200 mg total) by mouth daily.   Quantity: 90 tablet  Refills: 1     Lancets Misc      DX E 11.9.  Checks every morning.   Quantity: 50 each  Refills: 11     OneTouch Delica Lancets 33G Misc      4 x daily   Quantity: 100 each  Refills: 1     Accu-Chek FastClix Lancets Misc      Check sugar once daily as directed (E11.42)   Quantity: 100 each  Refills: 2     loratadine 10 MG Tabs  Commonly known as: Claritin      TAKE 1 TABLET BY MOUTH EVERY DAY   Quantity: 90 tablet  Refills: 1     montelukast 10 MG Tabs  Commonly known as: Singulair      Take 1 tablet (10 mg total) by mouth nightly.   Quantity: 90 tablet  Refills: 3     omeprazole 20 MG Cpdr  Commonly known as: PriLOSEC      Take 1 capsule (20 mg total) by mouth every morning.   Quantity: 90 capsule  Refills: 1     OneTouch Verio Strp      Test 1x daily   Quantity: 100 strip  Refills: 1     Accu-Chek Guide Strp      Check blood sugar twice a day (fasting and before dinner). DX: E11.42, NIDDM   Quantity: 200 strip  Refills: 1     predniSONE 5 MG Tabs  Commonly known as: Deltasone      Take 1 tablet (5 mg total) by mouth daily.   Quantity: 90 tablet  Refills: 1     rosuvastatin 20 MG Tabs  Commonly known as: Crestor      Take 1 tablet (20 mg total) by mouth nightly.   Quantity: 90 tablet  Refills: 0     valsartan 80 MG Tabs  Commonly known as: Diovan      Take 1 tablet (80 mg total) by mouth daily.   Quantity: 90  tablet  Refills: 3            STOP taking these medications      aspirin 81 MG Tbec        NIFEdipine ER 60 MG Tb24  Commonly known as: ADALAT CC        spironolactone 25 MG Tabs  Commonly known as: Aldactone        torsemide 20 MG Tabs  Commonly known as: Demadex                  Where to Get Your Medications        These medications were sent to [facility]/pharmacy #[id number] - [place], [place] - [address] [phone], [phone]  [address], [facility] [address]      Phone: [phone]   apixaban 5 MG Tabs  carvedilol 12.5 MG Tabs  loratadine 10 MG Tabs  midodrine 2.5 MG Tabs  octreoTIDe 100 mcg/mL Soln  tamsulosin 0.4 MG Caps         Discharge Plan:  Discharge Condition: Stable    Current Discharge Medication List        New Orders    Details   midodrine 2.5 MG Oral Tab Take 1 tablet (2.5 mg total) by mouth in the morning and 1 tablet (2.5 mg total) at noon and 1 tablet (2.5 mg total) in the evening. Hold if BP > 120/80..      octreoTIDe 100 mcg/mL Injection Solution Inject 1 mL (100 mcg total) into the skin every 8 (eight) hours.      apixaban 5 MG Oral Tab Take 2 tablets (10 mg total) by mouth 2 (two) times daily for 10 days, THEN 1 tablet (5 mg total) 2 (two) times daily.      tamsulosin 0.4 MG Oral Cap Take 1 capsule (0.4 mg total) by mouth daily.           Home Meds - Modified    Details   carvedilol 12.5 MG Oral Tab Take 1 tablet (12.5 mg total) by mouth 2 (two) times daily with meals.           Home Meds - Unchanged    Details   LORATADINE 10 MG Oral Tab TAKE 1 TABLET BY MOUTH EVERY DAY      glipiZIDE 5 MG Oral Tab Take 1.5 tablets (7.5 mg total) by mouth daily with breakfast.      !! Accu-Chek FastClix Lancets Does not apply Misc Check sugar once daily as directed (E11.42)      hydroxychloroquine 200 MG Oral Tab Take 1 tablet (200 mg total) by mouth daily.      !! Glucose Blood (ACCU-CHEK GUIDE) In Vitro Strip Check blood sugar twice a day (fasting and before dinner). DX: E11.42, NIDDM       rosuvastatin (CRESTOR) 20 MG Oral Tab Take 1 tablet (20 mg total) by mouth nightly.      omeprazole 20 MG Oral Capsule Delayed Release Take 1 capsule (20 mg total) by mouth every morning.      cyclobenzaprine 10 MG Oral Tab Take 1 tablet (10 mg total) by mouth nightly as needed for Muscle spasms.      glycopyrrolate 1 MG Oral Tab Take 1 tablet (1 mg total) by mouth 3 (three) times daily.      !! albuterol 108 (90 Base) MCG/ACT Inhalation Aero Soln inhale 2 puff by inhalation route  every 4 - 6 hours as needed      predniSONE 5 MG Oral Tab Take 1 tablet (5 mg total) by mouth daily.      montelukast 10 MG Oral Tab Take 1 tablet (10 mg total) by mouth nightly.      valsartan 80 MG Oral Tab Take 1 tablet (80 mg total) by mouth daily.      fluticasone-salmeterol (WIXELA INHUB) 250-50 MCG/ACT Inhalation Aerosol Powder, Breath Activated Inhale 1 puff into the lungs every 12 (twelve) hours.      !! albuterol 108 (90 Base) MCG/ACT Inhalation Aero Soln inhale 2 puff by inhalation route  every 4 - 6 hours as needed      !! Blood Glucose Monitoring Suppl (ONETOUCH VERIO) w/Device Does not apply Kit 1 each daily. Test 1x daily      !! OneTouch Delica Lancets 33G Does not apply Misc 4 x daily      !! Blood Glucose Monitoring Suppl (BLOOD GLUCOSE SYSTEM GILLES) Does not apply Kit DX E 11.9.  Checks every morning.      !! Lancets Does not apply Misc DX E 11.9.  Checks every morning.      !! Blood Glucose Monitoring Suppl w/Device Does not apply Kit DX E 11.9.  Checks every morning.      Blood Gluc Meter Disp-Strips Does not apply Device DX E 11.9.  Checks every morning.      acetaminophen 500 MG Oral Tab Take 1 tablet (500 mg total) by mouth every 6 (six) hours as needed for Pain.      !! Glucose Blood (ONETOUCH VERIO) In Vitro Strip Test 1x daily       !! - Potential duplicate medications found. Please discuss with provider.              Discharge Diet: Diabetic diet    Discharge Activity: As tolerated    Follow-up appointment:    Heike Sorto MD  [address] [facility] [address]  [phone]    Follow up in 1 week(s)      Diego Betancourt, PA-C  [address]  [address]  [facility] [address]  [phone]    Schedule an appointment as soon as possible for a visit in 1 week(s)  f/u for benson recomendations    Chapin Parker MD  [address] [facility]  [address]  [facility] [address]  [phone]    Schedule an appointment as soon as possible for a visit in 1 month(s)        Vital signs:  Temp:  [98.2 °F (36.8 °C)-98.7 °F (37.1 °C)] 98.7 °F (37.1 °C)  Pulse:  [81-87] 81  Resp:  [16-18] 16  BP: (107-143)/(47-70) 143/69  SpO2:  [94 %-100 %] 96 %    -----------------------------------------------------------------------------------------------  PATIENT DISCHARGE INSTRUCTIONS: See electronic chart    Heike Sorto MD 3/12/2024    Time spent:  30 to 74 minutes

## 2024-03-12 NOTE — PLAN OF CARE

## 2024-03-12 NOTE — PLAN OF CARE
Port de-accessed. Discharge med-rec still needs to be done by MD. Will endorse discharge to night shift. Patient updated on plan of care.

## 2024-03-12 NOTE — CM/SW NOTE
03/11/24 1100   Discharge disposition   Expected discharge disposition Home-Health   Post Acute Care Provider   ([facility])   Home services after discharge Senior services;Meals on Wheels   Discharge transportation Private car     RACHAEL confirmed with RN Lisa who stated pt is medically ready for discharge today.  Pt has discharge order.    Sandostatin LAR IM 20mg prior auth approved- RACHAEL spoke to Sandeep from [facility] and cancelled Octreotide delivery.     Discharge summary attached them via [facility] to [facility].  Marlen from [facility] made aware of discharge through [facility] Messages.     RACHAEL confirmed that [facility] contact information added to AVS.    RACHAEL made referral to [facility] 3/1 (defer to [facility]) to assess pt for care giving/meals on wheels.       PLAN: [place] home w/ [facility]    Heike Guerrier MSW, LSW [id number]

## 2024-03-12 NOTE — PROGRESS NOTES
[facility]     Gastroenterology Progress Note    Chester Buatista Patient Status:  Inpatient    1940 MRN [id number]   Location [facility]5W Attending Heike Sorto MD   Hosp Day # 18 PCP Heike Sorto MD       Subjective:   No dizziness, SOB, fatigue, N/V, black/bloody stools.  Tolerating diet  No fever, chills  Objective:   Blood pressure 118/55, pulse 81, temperature 98.5 °F (36.9 °C), temperature source Oral, resp. rate 17, height 5' 1\" (1.549 m), weight 129 lb 6.4 oz (58.7 kg), SpO2 96%. Body mass index is 24.45 kg/m².    Gen: awake, alert patient, NAD  HEENT: EOMI, the sclera appears anicteric, oropharynx clear, mucus membranes appear moist  CV: RRR  Lung: no conversational dyspnea   Abdomen: soft NTND abdomen with NABS appreciated   Skin: dry, warm, no jaundice  Ext: no LE edema is evident  Neuro: Alert and interactive  Psych: calm, cooperative    Assessment and Plan:   Chester Bautista is a a(n) 84 year old female w/ a history of breast cancer s/p lumpectomy and radiation, rheumatoid arthritis, COPD, HTN, HLD, DM, who presented with shortness of breath found to have new PE started on XARELTO this admission. GI consulted for acute on chronic anemia.     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 Resumed on XARELTO with recurrence of symptomatic anemia.  -IVC filter placed and xarelto stopped given recurrence of symptomatic anemia  -VCE attempted 3/9/2024 but pillcam remained in stomach for duration of the 8 hours study with incomplete small bowel study.  Continue to monitor for expulsion of pillcam, can obtain KUB in 2 weeks if not visualized  -Hgb stable at 7.8 but below baseline of ~10.  Recommend transfusing 1 unit PRBCs prior to discharge as Pt may require intermittent transfusions for symptomatic anemia  -PA approved for monthly sandostatin LAR IM.      Recommend:  -Monitor for pillcam expulsion, consider KUB in 2 weeks if not visualized  -Sandostatin 20mg LAR IM [id number], order OP  -Transfuse 1 unit PRBCs prior to discharge  -Follow up in primary clinic in 1 week for Hgb check, may require intermittent transfusions for symptomatic anemia    Pt is stable from a GI standpoint.  GI will sign off at this time.  Please, reach out to our team if new GI concerns arise.  Thank you for the opportunity to participate in this patient's care.      Case discussed with Zoila Mcqueen MD and Lisa SOW.    Rahel Adam PA-C  [facility]  3/12/2024      Results:     Lab Results   Component Value Date    WBC 12.7 (H) 03/12/2024    HGB 7.8 (L) 03/12/2024    HCT 24.6 (L) 03/12/2024    .0 03/12/2024    CREATSERUM 1.19 (H) 03/12/2024    BUN 28 (H) 03/12/2024     03/12/2024    K 3.9 03/12/2024     03/12/2024    CO2 26.0 03/12/2024     (H) 03/12/2024    CA 8.7 03/12/2024    ALB 3.3 03/11/2024    ALKPHO 64 03/11/2024    BILT 0.2 03/11/2024    TP 6.2 03/11/2024    AST 52 (H) 03/11/2024    ALT 40 03/11/2024    PTT 69.9 (H) 02/28/2024    INR 0.93 12/14/2023    TSH 0.622 10/20/2023    DDIMER 1.60 (H) 02/23/2024    ESRML 91 (H) 09/14/2023    CRP 1.10 (H) 09/14/2023    MG 1.9 03/09/2024    B12 748 10/20/2023       No results found.

## 2024-03-13 ENCOUNTER — NURSE ONLY (OUTPATIENT)
Dept: HEMATOLOGY/ONCOLOGY | Facility: HOSPITAL | Age: 84
End: 2024-03-13
Attending: INTERNAL MEDICINE
Payer: MEDICARE

## 2024-03-13 ENCOUNTER — TELEPHONE (OUTPATIENT)
Dept: INTERNAL MEDICINE CLINIC | Facility: CLINIC | Age: 84
End: 2024-03-13

## 2024-03-13 ENCOUNTER — PATIENT OUTREACH (OUTPATIENT)
Dept: CASE MANAGEMENT | Age: 84
End: 2024-03-13

## 2024-03-13 DIAGNOSIS — E87.5 HYPERKALEMIA: ICD-10-CM

## 2024-03-13 DIAGNOSIS — I26.99 OTHER PULMONARY EMBOLISM WITHOUT ACUTE COR PULMONALE, UNSPECIFIED CHRONICITY (HCC): ICD-10-CM

## 2024-03-13 DIAGNOSIS — K90.9 MALABSORPTION OF IRON (HCC): ICD-10-CM

## 2024-03-13 DIAGNOSIS — E87.6 HYPOKALEMIA: ICD-10-CM

## 2024-03-13 DIAGNOSIS — D62 ABLA (ACUTE BLOOD LOSS ANEMIA): ICD-10-CM

## 2024-03-13 DIAGNOSIS — D62 ACUTE BLOOD LOSS ANEMIA: ICD-10-CM

## 2024-03-13 DIAGNOSIS — Z02.9 ENCOUNTERS FOR UNSPECIFIED ADMINISTRATIVE PURPOSE: Primary | ICD-10-CM

## 2024-03-13 DIAGNOSIS — D50.0 IRON DEFICIENCY ANEMIA DUE TO CHRONIC BLOOD LOSS: ICD-10-CM

## 2024-03-13 DIAGNOSIS — E11.9 TYPE 2 DIABETES MELLITUS WITHOUT RETINOPATHY (HCC): ICD-10-CM

## 2024-03-13 DIAGNOSIS — Z45.2 ENCOUNTER FOR CARE RELATED TO VASCULAR ACCESS PORT: Primary | ICD-10-CM

## 2024-03-13 DIAGNOSIS — E78.5 DYSLIPIDEMIA: ICD-10-CM

## 2024-03-13 DIAGNOSIS — Q27.30 AVM (ARTERIOVENOUS MALFORMATION) (HCC): ICD-10-CM

## 2024-03-13 LAB
ALBUMIN SERPL-MCNC: 3.9 G/DL (ref 3.2–4.8)
ALBUMIN/GLOB SERPL: 1.1 {RATIO} (ref 1–2)
ALP LIVER SERPL-CCNC: 73 U/L
ALT SERPL-CCNC: 38 U/L
ANION GAP SERPL CALC-SCNC: 4 MMOL/L (ref 0–18)
AST SERPL-CCNC: 39 U/L (ref ?–34)
BILIRUB SERPL-MCNC: 0.4 MG/DL (ref 0.2–1.1)
BLOOD TYPE BARCODE: 7300
BUN BLD-MCNC: 29 MG/DL (ref 9–23)
BUN/CREAT SERPL: 19 (ref 10–20)
CALCIUM BLD-MCNC: 9 MG/DL (ref 8.7–10.4)
CHLORIDE SERPL-SCNC: 105 MMOL/L (ref 98–112)
CO2 SERPL-SCNC: 27 MMOL/L (ref 21–32)
CREAT BLD-MCNC: 1.53 MG/DL
DEPRECATED RDW RBC AUTO: 57.6 FL (ref 35.1–46.3)
EGFRCR SERPLBLD CKD-EPI 2021: 33 ML/MIN/1.73M2 (ref 60–?)
ERYTHROCYTE [DISTWIDTH] IN BLOOD BY AUTOMATED COUNT: 17.9 % (ref 11–15)
GLOBULIN PLAS-MCNC: 3.4 G/DL (ref 2.8–4.4)
GLUCOSE BLD-MCNC: 202 MG/DL (ref 70–99)
HCT VFR BLD AUTO: 33.2 %
HGB BLD-MCNC: 11.2 G/DL
MCH RBC QN AUTO: 30.4 PG (ref 26–34)
MCHC RBC AUTO-ENTMCNC: 33.7 G/DL (ref 31–37)
MCV RBC AUTO: 90 FL
OSMOLALITY SERPL CALC.SUM OF ELEC: 294 MOSM/KG (ref 275–295)
PLATELET # BLD AUTO: 259 10(3)UL (ref 150–450)
POTASSIUM SERPL-SCNC: 3.6 MMOL/L (ref 3.5–5.1)
PROT SERPL-MCNC: 7.3 G/DL (ref 5.7–8.2)
RBC # BLD AUTO: 3.69 X10(6)UL
SODIUM SERPL-SCNC: 136 MMOL/L (ref 136–145)
UNIT VOLUME: 350 ML
WBC # BLD AUTO: 17.2 X10(3) UL (ref 4–11)

## 2024-03-13 PROCEDURE — 1159F MED LIST DOCD IN RCRD: CPT

## 2024-03-13 PROCEDURE — 85027 COMPLETE CBC AUTOMATED: CPT

## 2024-03-13 PROCEDURE — 80053 COMPREHEN METABOLIC PANEL: CPT

## 2024-03-13 PROCEDURE — 96372 THER/PROPH/DIAG INJ SC/IM: CPT

## 2024-03-13 PROCEDURE — 1111F DSCHRG MED/CURRENT MED MERGE: CPT

## 2024-03-13 RX ORDER — HEPARIN SODIUM (PORCINE) LOCK FLUSH IV SOLN 100 UNIT/ML 100 UNIT/ML
5 SOLUTION INTRAVENOUS ONCE
Status: CANCELLED | OUTPATIENT
Start: 2024-03-15

## 2024-03-13 RX ORDER — SODIUM CHLORIDE 9 MG/ML
10 INJECTION, SOLUTION INTRAMUSCULAR; INTRAVENOUS; SUBCUTANEOUS ONCE
Status: CANCELLED | OUTPATIENT
Start: 2024-03-15

## 2024-03-13 RX ORDER — HEPARIN SODIUM (PORCINE) LOCK FLUSH IV SOLN 100 UNIT/ML 100 UNIT/ML
5 SOLUTION INTRAVENOUS ONCE
Status: COMPLETED | OUTPATIENT
Start: 2024-03-13 | End: 2024-03-13

## 2024-03-13 RX ADMIN — HEPARIN SODIUM (PORCINE) LOCK FLUSH IV SOLN 100 UNIT/ML 500 UNITS: 100 SOLUTION INTRAVENOUS at 10:56:00

## 2024-03-13 NOTE — PROGRESS NOTES
Pt here to start 20mg Sandostatin/ Octreotide IM Q4 weeks per Dr Mcqueen for AVM malformation  Just home from long hospitalization, per pt, since last night  Daughter with her  Pt had octreotide in the hospital 3x/day subcutaneous, she mentioned  Instructed on med name, purpose, route, freq, and pot SE's, when to call MD - she voiced  understanding    She also asked that we draw labs from Dr Sorto via her port - done, sent and tolerated well    Sandostatin IM given right upper outer glut IM, tolerated well    Discharged stable to home with future appts - printed and given AVS  Gait steady, slow with standby assist x1

## 2024-03-13 NOTE — TELEPHONE ENCOUNTER
Spoke to patient for TCM today.  Patient does not have an appointment scheduled at this time.  TCM appointment recommended by 3/19/2024 as patient is a High risk for readmission.  Please advise.    NCM Attempted to schedule PCP office TCM appointment with patient, Due to MD's full schedule message was sent to MD's office with request to assist with scheduling TCM appointment.    BOOK BY DATE (last date for TCM): 3/26/2024    Clinical staff:  Please notify Dr Sorto of the above as Dr Sorto wants to see this patient within 7 days of discharge. Then please call patient's daughter Brea to schedule the TCM appointment within the TCM timeframe.    Thank you!

## 2024-03-13 NOTE — PROGRESS NOTES
Initial Post Discharge Follow Up   Discharge Date: 3/12/24  Contact Date: 3/13/2024    Consent Verification:  Assessment Completed With: Other: Brea, patient's daughter Permission received per patient?  written  HIPAA Verified?  Yes    Discharge Dx:   Other PE without acute cor pulmonale, unspecified chronicity  Dyslipidemia  Type 2 DM without retinopathy  Acute blood loss anemia  Hyperkalemia  Hypokalemia  S/P EGD with push Enteroscopy on 2/27/24  S/P EGD 2/28/24  S/P IVC filter insertion, right internal jugular 3/7/24  S/P Capsule endoscopy 3/9/24    General:   How have you been since your discharge from the hospital? Brea states that her mother Prudencio is doing well, still weak/tired. Brea states she took her mother to the [facility] for her infusion of octreoTIDe today and to have labs drawn. Brea states that her mother does not have a fever/chills, no shortness of breath, no cough, no chest pain, no pain radiating from chest to neck, jaw, shoulders, arms or upper back. Brea reports that her mother does not have any abdominal pain, no nausea/vomiting. Brea states that her mother's urine is clear, not cloudy, no foul odor. NCM instructed patient to change positions frequently, walk as tolerated, rest when needed, stay hydrated up to fluid restriction, and continue to take up to ten deep breaths an hour while awake, or if watching television take a deep breath with every commercial. NCM reviewed discharge instructions, medications, S&S of infection/blood clots with Brea, she verbalized understanding of these. Brea denies any further questions or needs at this time.  Do you have any pain since discharge?  No    When you were leaving the hospital were your discharge instructions reviewed with you? Yes  How well were your discharge instructions explained to you?   On a scale of 1-5   1- Very Poor and 5- Very well   Very Well  Do you have any questions about your  discharge instructions?  No  Before leaving the hospital was your diagnoses explained to you? Yes  Do you have any questions about your diagnoses? No  Are you able to perform normal daily activities of living as you have prior to your hospital stay (dressing, bathing, ambulating to the bathroom, etc)? yes  If No, What are some barriers or concerns?  Brea reports that she helps her mother with whatever she needs help with.  (NCM) Was patient given a different diet per AVS? yes  If so, which diet?  Carbohydrate controlled diet   Calorie Restriction/Carb Controlled: 1800 kcal/60 grams  Sodium Restriction: 1.5 GM NA  Fluid Restriction: 1800 ml  Texture Consistency: Regular  Are there any barriers to following this diet? no, Brea states her mother is also eating low sodium as well.    Medications:   STOP taking:  aspirin 81 MG Tbec  NIFEdipine ER 60 MG Tb24 (ADALAT CC)  spironolactone 25 MG Tabs (Aldactone)  torsemide 20 MG Tabs (Demadex)  Review your updated medication list below.  Current Outpatient Medications   Medication Sig Dispense Refill    gabapentin 100 MG Oral Cap Take 1 capsule (100 mg total) by mouth nightly. 90 capsule 3    carvedilol 12.5 MG Oral Tab Take 1 tablet (12.5 mg total) by mouth 2 (two) times daily with meals. 60 tablet 0    midodrine 2.5 MG Oral Tab Take 1 tablet (2.5 mg total) by mouth in the morning and 1 tablet (2.5 mg total) at noon and 1 tablet (2.5 mg total) in the evening. Hold if BP > 120/80.. 90 tablet 0    octreoTIDe 100 mcg/mL Injection Solution Inject 1 mL (100 mcg total) into the skin every 8 (eight) hours. 90 mL 0    apixaban 5 MG Oral Tab Take 2 tablets (10 mg total) by mouth 2 (two) times daily for 10 days, THEN 1 tablet (5 mg total) 2 (two) times daily. 110 tablet 0    tamsulosin 0.4 MG Oral Cap Take 1 capsule (0.4 mg total) by mouth daily. 30 capsule 0    LORATADINE 10 MG Oral Tab TAKE 1 TABLET BY MOUTH EVERY DAY 90 tablet 1    glipiZIDE 5 MG Oral Tab Take 1.5 tablets  (7.5 mg total) by mouth daily with breakfast.      Accu-Chek FastClix Lancets Does not apply Misc Check sugar once daily as directed (E11.42) 100 each 2    hydroxychloroquine 200 MG Oral Tab Take 1 tablet (200 mg total) by mouth daily. 90 tablet 1    Glucose Blood (ACCU-CHEK GUIDE) In Vitro Strip Check blood sugar twice a day (fasting and before dinner). DX: E11.42, NIDDM 200 strip 1    rosuvastatin (CRESTOR) 20 MG Oral Tab Take 1 tablet (20 mg total) by mouth nightly. 90 tablet 0    omeprazole 20 MG Oral Capsule Delayed Release Take 1 capsule (20 mg total) by mouth every morning. 90 capsule 1    cyclobenzaprine 10 MG Oral Tab Take 1 tablet (10 mg total) by mouth nightly as needed for Muscle spasms. 90 tablet 1    glycopyrrolate 1 MG Oral Tab Take 1 tablet (1 mg total) by mouth 3 (three) times daily. 90 tablet 1    albuterol 108 (90 Base) MCG/ACT Inhalation Aero Soln inhale 2 puff by inhalation route  every 4 - 6 hours as needed 1 each 0    predniSONE 5 MG Oral Tab Take 1 tablet (5 mg total) by mouth daily. 90 tablet 1    montelukast 10 MG Oral Tab Take 1 tablet (10 mg total) by mouth nightly. 90 tablet 3    valsartan 80 MG Oral Tab Take 1 tablet (80 mg total) by mouth daily. 90 tablet 3    fluticasone-salmeterol (WIXELA INHUB) 250-50 MCG/ACT Inhalation Aerosol Powder, Breath Activated Inhale 1 puff into the lungs every 12 (twelve) hours. 1 each 5    albuterol 108 (90 Base) MCG/ACT Inhalation Aero Soln inhale 2 puff by inhalation route  every 4 - 6 hours as needed 1 each 5    Blood Glucose Monitoring Suppl (ONETOUCH VERIO) w/Device Does not apply Kit 1 each daily. Test 1x daily 1 kit 0    Glucose Blood (ONETOUCH VERIO) In Vitro Strip Test 1x daily 100 strip 1    OneTouch Delica Lancets 33G Does not apply Misc 4 x daily 100 each 1    Blood Glucose Monitoring Suppl (BLOOD GLUCOSE SYSTEM GILLES) Does not apply Kit DX E 11.9.  Checks every morning. 1 kit 0    Lancets Does not apply Misc DX E 11.9.  Checks every morning. 50  each 11    Blood Glucose Monitoring Suppl w/Device Does not apply Kit DX E 11.9.  Checks every morning. 1 kit 0    Blood Gluc Meter Disp-Strips Does not apply Device DX E 11.9.  Checks every morning. 100 each 11    acetaminophen 500 MG Oral Tab Take 1 tablet (500 mg total) by mouth every 6 (six) hours as needed for Pain.       Were there any changes to your current medication(s) noted on the AVS? Yes  If so, were these medication changes discussed with you prior to leaving the hospital? Yes  If a new medication was prescribed:    Was the new medication's purpose & side effects reviewed? Yes  Do you have any questions about your new medication? No  Did you  your discharge medications when you left the hospital? No  Let's go over your medications together to make sure we are not missing anything. Medications Reviewed  Are there any reasons that keep you from taking your medication as prescribed? No  Are you having any concerns with constipation? No  Did patient receive their flu shot (Sept-March)? No    Discharge medications reviewed/discussed/and reconciled against outpatient medications with patient.  Any changes or updates to medications sent to PCP.  Patient Acknowledged     Referrals/orders at D/C:  Referrals/orders placed at D/C? yes  What services:   Home health, PT, and OT   (If HH was ordered) Has HH been set up?  HH has called    If Yes: With Whom: [facility]    DME ordered at D/C? No      Discharge orders, AVS reviewed and discussed with patient. Any changes or updates to orders sent to PCP.  Patient Acknowledged      SDOH:   Transportation:    Transportation Needs: No Transportation Needs (2/23/2024)    Transportation Needs     Lack of Transportation: No       Financial Strain:    Financial Resource Strain: Low Risk  (2/6/2024)    Financial Resource Strain     Difficulty of Paying Living Expenses: Not hard at all     Med Affordability: No       Diagnosis specifics:   Notify physician if  you experience any of the followin. persistent N/V  2. severe uncontrolled pain  3. redness, tenderness, or signs of infection (pain, swelling, redness, odor or green/yellow discharge around incision site)  4. difficulty breathing, headache or visual disturbances  5. hives  6. persistent dizziness or light-headedness  7. extreme fatigue  8. Notify of Temp 100.4 or greater    Follow up appointments:      Your appointments       Date & Time Appointment Department ([place])    Mar 20, 2024  2:30 PM CDT Follow Up Visit with Vic Thakkar APRN [facility] ([facility])        Apr 10, 2024 10:00 AM CDT Injection - Em Cc with EM CC LAB1 [facility] ([facility])        May 03, 2024 11:30 AM CDT [facility]        May 03, 2024 12:30 PM CDT HEMATOLOGY ONCOLOGY FOLLOW UP with Eric Mohamud MD [facility] Oncology ([facility])        May 08, 2024 10:00 AM CDT Injection - Em Cc with EM CC LAB1 [place] [facility] ([facility])        May 21, 2024  3:15 PM CDT Follow Up Visit with Vic Thakkar APRN [facility] ([facility])        2024  1:45 PM CDT Exam - Established with Quinton Tesfaye MD [facility] ([facility])        Sep 24, 2024  1:45 PM CDT Follow Up Visit with Chapin Parker MD [facility] ([facility])              [facility] Hematology Oncology  [facility]  [address]  [place] [address]  [phone] [facility]  [address]  [place] [address] [phone] [facility]  [address], [facility]  [address]  [facility] [id number] [phone]    [facility], [facility], [facility][note 2] [place]  [address]  [facility] [id number] [phone] [facility], [facility], [facility]  [address]  [facility] [address] [phone] [facility]  [address]  [facility] [address]  [phone]            TCC  Was TCC ordered: No      PCP (If no TCC appointment)  Does patient already have a PCP appointment scheduled? No  NCM Attempted to schedule PCP office TCM appointment with patient   If no appointment scheduled: Explain, Due to MD's full schedule message was sent to MD's office with request to assist with scheduling TCM appointment.    Specialist    Does the patient have any other follow up appointment(s) needing to be scheduled? Yes  If yes: NCM reviewed upcoming specialist appointment with patient: Yes  Does the patient need assistance scheduling appointment(s): No, Brea states she is waiting from a call back from GI and she is going to call to schedule with Dr Parker, Pulmonary and with Diego Betancourt PA-C in urology.    Is there any reason as to why you cannot make your appointment(s)?  No     Needs post D/C:   Now that you are home, are there any needs or concerns you need addressed before your next visit with your PCP?  (DME, meds, questions, etc.): No    Interventions by NCM:   NCM reviewed medications, discharge instructions, S&S of infection/blood clots. Brea instructed to report any new or worsening symptoms, when to call the doctor and when to call 911. Brea verbalized understanding of these. NCM instructed Brea to call her mother's PC PCP with any questions or needs, she states she will.      CCM referral placed:    No, not at this time    BOOK BY DATE: 3/26/2024

## 2024-03-13 NOTE — PAYOR COMM NOTE
--------------  DISCHARGE REVIEW    Payor: TRINO MA [facility]  Subscriber #:  [id number]  Authorization Number: [id number]    Admit date: 2/23/24  Admit time:   6:34 PM  Discharge Date: 3/12/2024  9:40 PM     Admitting Physician: Alejandra Palomino MD  Attending Physician:  No att. providers found  Primary Care Physician: Heike Sorto MD

## 2024-03-13 NOTE — PROGRESS NOTES
NCM spoke with patient's daughter Brea, she states that she is taking her mother over to the [facility] for her injection of Sandostatin, and requests a call back later.    Discharge DX:  Other PE without acute cor pulmonale, unspecified chronicity  Dyslipidemia  Type 2 DM without retinopathy  Acute blood loss anemia  Hyperkalemia  Hypokalemia  S/P EGD with push Enteroscopy on 2/27/24  S/P EGD 2/28/24  S/P IVC filter insertion, right internal jugular 3/7/24  S/P Capsule endoscopy 3/9/24

## 2024-03-14 ENCOUNTER — TELEPHONE (OUTPATIENT)
Dept: INTERNAL MEDICINE CLINIC | Facility: CLINIC | Age: 84
End: 2024-03-14

## 2024-03-14 ENCOUNTER — TELEPHONE (OUTPATIENT)
Dept: PULMONOLOGY | Facility: CLINIC | Age: 84
End: 2024-03-14

## 2024-03-14 NOTE — TELEPHONE ENCOUNTER
For right now we are to stop: we will need to resume at some point in the future to to be determined.  But not now.

## 2024-03-14 NOTE — TELEPHONE ENCOUNTER
Called and spoke with daughter, Brea, stated she will call me back.     CSS:  Please transfer to RN if patient calls back.  Thank you.

## 2024-03-14 NOTE — TELEPHONE ENCOUNTER
Pt's daughter, Brea called back, pt's name/ called back, discussed telemedicine appt 3/15 430pm and she accepted it.     Your appointments       Date & Time Appointment Department (Center)    Mar 15, 2024  4:30 PM CDT Virtual Phone Visit with Mikey Garcia MD [facility] ([facility])    You have been scheduled for a Virtual Telephone visit with your provider. Please be available at your phone so that your physician can contact you, and be prepared with any questions or concerns. As always, your health is our priority.     We suggest confirming with your insurance regarding coverage prior to your appointment as some payors may no longer cover telephone visits. Depending on your insurance you may also be billed a copay at a later time.         No further action required at this time, TE closed.

## 2024-03-14 NOTE — TELEPHONE ENCOUNTER
Pts daughter called to schedule hosp follow up appt with Dr. Parker within 1 month.  No appts available.  Please call.

## 2024-03-14 NOTE — TELEPHONE ENCOUNTER
Daughter Brea(on HIPAA) indicated that patient was discharged from [facility] on 3/12/2024. Daughter wanted to verify if patient is to take eliquis? Discharge summary stated to start eliquis on 3/4/2024 but patient was in the hospital at the time. Stated in the hospital they stopped the medication because patient was having an HENRY bleed. Please confirm if patient is to take the eliquis. Please advise.

## 2024-03-15 ENCOUNTER — APPOINTMENT (OUTPATIENT)
Dept: HEMATOLOGY/ONCOLOGY | Facility: HOSPITAL | Age: 84
End: 2024-03-15
Attending: INTERNAL MEDICINE
Payer: MEDICARE

## 2024-03-15 ENCOUNTER — TELEPHONE (OUTPATIENT)
Dept: INTERNAL MEDICINE CLINIC | Facility: CLINIC | Age: 84
End: 2024-03-15

## 2024-03-15 DIAGNOSIS — D62 ABLA (ACUTE BLOOD LOSS ANEMIA): ICD-10-CM

## 2024-03-15 DIAGNOSIS — Q27.30 AVM (ARTERIOVENOUS MALFORMATION) (HCC): ICD-10-CM

## 2024-03-15 DIAGNOSIS — Z45.2 ENCOUNTER FOR CARE RELATED TO VASCULAR ACCESS PORT: Primary | ICD-10-CM

## 2024-03-15 DIAGNOSIS — E83.42 HYPOMAGNESEMIA: Primary | ICD-10-CM

## 2024-03-15 DIAGNOSIS — D50.0 IRON DEFICIENCY ANEMIA DUE TO CHRONIC BLOOD LOSS: ICD-10-CM

## 2024-03-15 DIAGNOSIS — I26.99 OTHER PULMONARY EMBOLISM WITHOUT ACUTE COR PULMONALE, UNSPECIFIED CHRONICITY (HCC): ICD-10-CM

## 2024-03-15 DIAGNOSIS — K90.9 MALABSORPTION OF IRON (HCC): ICD-10-CM

## 2024-03-15 DIAGNOSIS — E87.6 HYPOKALEMIA: ICD-10-CM

## 2024-03-15 LAB
ALBUMIN SERPL-MCNC: 3.6 G/DL (ref 3.2–4.8)
ALBUMIN/GLOB SERPL: 1.1 {RATIO} (ref 1–2)
ALP LIVER SERPL-CCNC: 73 U/L
ALT SERPL-CCNC: 41 U/L
ANION GAP SERPL CALC-SCNC: 7 MMOL/L (ref 0–18)
AST SERPL-CCNC: 47 U/L (ref ?–34)
BILIRUB SERPL-MCNC: 0.3 MG/DL (ref 0.2–1.1)
BUN BLD-MCNC: 27 MG/DL (ref 9–23)
BUN/CREAT SERPL: 22.9 (ref 10–20)
CALCIUM BLD-MCNC: 8.3 MG/DL (ref 8.7–10.4)
CHLORIDE SERPL-SCNC: 106 MMOL/L (ref 98–112)
CO2 SERPL-SCNC: 26 MMOL/L (ref 21–32)
CREAT BLD-MCNC: 1.18 MG/DL
DEPRECATED RDW RBC AUTO: 55.7 FL (ref 35.1–46.3)
EGFRCR SERPLBLD CKD-EPI 2021: 45 ML/MIN/1.73M2 (ref 60–?)
ERYTHROCYTE [DISTWIDTH] IN BLOOD BY AUTOMATED COUNT: 17 % (ref 11–15)
GLOBULIN PLAS-MCNC: 3.4 G/DL (ref 2.8–4.4)
GLUCOSE BLD-MCNC: 169 MG/DL (ref 70–99)
HCT VFR BLD AUTO: 31.1 %
HGB BLD-MCNC: 10.1 G/DL
MAGNESIUM SERPL-MCNC: 1.1 MG/DL (ref 1.6–2.6)
MCH RBC QN AUTO: 29.2 PG (ref 26–34)
MCHC RBC AUTO-ENTMCNC: 32.5 G/DL (ref 31–37)
MCV RBC AUTO: 89.9 FL
OSMOLALITY SERPL CALC.SUM OF ELEC: 297 MOSM/KG (ref 275–295)
PLATELET # BLD AUTO: 288 10(3)UL (ref 150–450)
POTASSIUM SERPL-SCNC: 4.1 MMOL/L (ref 3.5–5.1)
PROT SERPL-MCNC: 7 G/DL (ref 5.7–8.2)
RBC # BLD AUTO: 3.46 X10(6)UL
SODIUM SERPL-SCNC: 139 MMOL/L (ref 136–145)
WBC # BLD AUTO: 15.3 X10(3) UL (ref 4–11)

## 2024-03-15 PROCEDURE — 85027 COMPLETE CBC AUTOMATED: CPT

## 2024-03-15 PROCEDURE — 36591 DRAW BLOOD OFF VENOUS DEVICE: CPT

## 2024-03-15 PROCEDURE — 80053 COMPREHEN METABOLIC PANEL: CPT

## 2024-03-15 PROCEDURE — 83735 ASSAY OF MAGNESIUM: CPT

## 2024-03-15 RX ORDER — HEPARIN SODIUM (PORCINE) LOCK FLUSH IV SOLN 100 UNIT/ML 100 UNIT/ML
5 SOLUTION INTRAVENOUS ONCE
Status: CANCELLED | OUTPATIENT
Start: 2024-04-12

## 2024-03-15 RX ORDER — HEPARIN SODIUM (PORCINE) LOCK FLUSH IV SOLN 100 UNIT/ML 100 UNIT/ML
5 SOLUTION INTRAVENOUS ONCE
Status: COMPLETED | OUTPATIENT
Start: 2024-03-15 | End: 2024-03-15

## 2024-03-15 RX ORDER — SODIUM CHLORIDE 9 MG/ML
10 INJECTION, SOLUTION INTRAMUSCULAR; INTRAVENOUS; SUBCUTANEOUS ONCE
OUTPATIENT
Start: 2024-04-12

## 2024-03-15 RX ADMIN — HEPARIN SODIUM (PORCINE) LOCK FLUSH IV SOLN 100 UNIT/ML 500 UNITS: 100 SOLUTION INTRAVENOUS at 15:17:00

## 2024-03-15 NOTE — TELEPHONE ENCOUNTER
Discussed with Dr. Sorto.  Patient to start magnesium 400 mg twice daily OTC and recheck level in 3 days.  Patient and her daughter notified.  Lab order placed.

## 2024-03-17 NOTE — PROGRESS NOTES
CBC abNormal,  white blood cell count is elevated.  Anemia is present but better than prior.  Normal (white blood cells and red blood cells and platelets),   CMP Normal (electrolytes, and liver functions), slight worsening of kidney function from prior.  Hydrate at least 48 ounces of water a day.  Follow-up with kidney doctor.  1 liver enzyme is slightly elevated but better than prior.

## 2024-03-17 NOTE — PROGRESS NOTES
CBC abNormal (white blood cells and red blood cells and platelets),   CMP Normal (electrolytes, and liver functions), declining kidney function is improved but still declined.No motrin, ibuprofen, advil, alleve, naprosyn  with these medications.  White blood cell count is elevated but better than prior and hemoglobin is now stable.  Magnesium level is already discussed.  Recheck magnesium level in 1 week.  Orders written.

## 2024-03-18 ENCOUNTER — TELEPHONE (OUTPATIENT)
Dept: INTERNAL MEDICINE CLINIC | Facility: CLINIC | Age: 84
End: 2024-03-18

## 2024-03-18 ENCOUNTER — NURSE ONLY (OUTPATIENT)
Dept: HEMATOLOGY/ONCOLOGY | Facility: HOSPITAL | Age: 84
End: 2024-03-18
Attending: INTERNAL MEDICINE
Payer: MEDICARE

## 2024-03-18 DIAGNOSIS — D50.0 IRON DEFICIENCY ANEMIA DUE TO CHRONIC BLOOD LOSS: ICD-10-CM

## 2024-03-18 DIAGNOSIS — D50.0 IRON DEFICIENCY ANEMIA DUE TO CHRONIC BLOOD LOSS: Primary | ICD-10-CM

## 2024-03-18 DIAGNOSIS — K90.9 MALABSORPTION OF IRON (HCC): ICD-10-CM

## 2024-03-18 DIAGNOSIS — Z45.2 ENCOUNTER FOR CARE RELATED TO VASCULAR ACCESS PORT: Primary | ICD-10-CM

## 2024-03-18 DIAGNOSIS — Z97.8 PRESENCE OF INDWELLING FOLEY CATHETER: ICD-10-CM

## 2024-03-18 DIAGNOSIS — Q27.30 AVM (ARTERIOVENOUS MALFORMATION) (HCC): ICD-10-CM

## 2024-03-18 DIAGNOSIS — E83.42 HYPOMAGNESEMIA: ICD-10-CM

## 2024-03-18 DIAGNOSIS — Z09 HOSPITAL DISCHARGE FOLLOW-UP: ICD-10-CM

## 2024-03-18 DIAGNOSIS — R33.9 URINARY RETENTION: Primary | ICD-10-CM

## 2024-03-18 LAB
DEPRECATED HBV CORE AB SER IA-ACNC: 1034.5 NG/ML
IRON SATN MFR SERPL: 10 %
IRON SERPL-MCNC: 23 UG/DL
MAGNESIUM SERPL-MCNC: 1.7 MG/DL (ref 1.6–2.6)
TIBC SERPL-MCNC: 231 UG/DL (ref 250–425)
TRANSFERRIN SERPL-MCNC: 155 MG/DL (ref 250–380)

## 2024-03-18 PROCEDURE — 36591 DRAW BLOOD OFF VENOUS DEVICE: CPT

## 2024-03-18 PROCEDURE — 83540 ASSAY OF IRON: CPT

## 2024-03-18 PROCEDURE — 82728 ASSAY OF FERRITIN: CPT

## 2024-03-18 PROCEDURE — 83735 ASSAY OF MAGNESIUM: CPT

## 2024-03-18 PROCEDURE — 84466 ASSAY OF TRANSFERRIN: CPT

## 2024-03-18 RX ORDER — HEPARIN SODIUM (PORCINE) LOCK FLUSH IV SOLN 100 UNIT/ML 100 UNIT/ML
5 SOLUTION INTRAVENOUS ONCE
Status: COMPLETED | OUTPATIENT
Start: 2024-03-18 | End: 2024-03-18

## 2024-03-18 RX ORDER — SODIUM CHLORIDE 9 MG/ML
10 INJECTION, SOLUTION INTRAMUSCULAR; INTRAVENOUS; SUBCUTANEOUS ONCE
OUTPATIENT
Start: 2024-04-12

## 2024-03-18 RX ORDER — HEPARIN SODIUM (PORCINE) LOCK FLUSH IV SOLN 100 UNIT/ML 100 UNIT/ML
5 SOLUTION INTRAVENOUS ONCE
OUTPATIENT
Start: 2024-04-12

## 2024-03-18 RX ADMIN — HEPARIN SODIUM (PORCINE) LOCK FLUSH IV SOLN 100 UNIT/ML 500 UNITS: 100 SOLUTION INTRAVENOUS at 15:05:00

## 2024-03-18 NOTE — TELEPHONE ENCOUNTER
Patient's daughter Brea (on MANUEL) was called with results today and asked if a referral was placed for Urology as she has an upcoming visit scheduled on 3/22/24. I advised patient that MAGI Barraza has pended one and that Dr. Sorto has not yet signed it. Advised I will send it again.    See pended   
Per pt daughter pt was discharged from the hospital with a benson due to urinary retention and has a f/u with urology 3/20/24 to have it removed.  Pt needs referral.    Pended for review.  
Laceration Repair

## 2024-03-22 ENCOUNTER — OFFICE VISIT (OUTPATIENT)
Dept: SURGERY | Facility: CLINIC | Age: 84
End: 2024-03-22

## 2024-03-22 ENCOUNTER — OFFICE VISIT (OUTPATIENT)
Dept: INTERNAL MEDICINE CLINIC | Facility: CLINIC | Age: 84
End: 2024-03-22

## 2024-03-22 VITALS
BODY MASS INDEX: 26.43 KG/M2 | WEIGHT: 140 LBS | OXYGEN SATURATION: 100 % | TEMPERATURE: 98 F | HEART RATE: 90 BPM | SYSTOLIC BLOOD PRESSURE: 157 MMHG | HEIGHT: 61 IN | DIASTOLIC BLOOD PRESSURE: 73 MMHG

## 2024-03-22 DIAGNOSIS — K31.811 AVM (ARTERIOVENOUS MALFORMATION) OF DUODENUM, ACQUIRED WITH HEMORRHAGE: ICD-10-CM

## 2024-03-22 DIAGNOSIS — I26.99 OTHER ACUTE PULMONARY EMBOLISM WITHOUT ACUTE COR PULMONALE (HCC): ICD-10-CM

## 2024-03-22 DIAGNOSIS — R33.9 URINARY RETENTION: Primary | ICD-10-CM

## 2024-03-22 DIAGNOSIS — E83.42 HYPOMAGNESEMIA: ICD-10-CM

## 2024-03-22 DIAGNOSIS — Z71.85 VACCINE COUNSELING: ICD-10-CM

## 2024-03-22 DIAGNOSIS — D62 ACUTE ON CHRONIC BLOOD LOSS ANEMIA: ICD-10-CM

## 2024-03-22 DIAGNOSIS — D50.0 IRON DEFICIENCY ANEMIA DUE TO CHRONIC BLOOD LOSS: ICD-10-CM

## 2024-03-22 DIAGNOSIS — E83.52 HYPERCALCEMIA: ICD-10-CM

## 2024-03-22 DIAGNOSIS — E87.5 HYPERKALEMIA: ICD-10-CM

## 2024-03-22 DIAGNOSIS — R31.21 ASYMPTOMATIC MICROSCOPIC HEMATURIA: ICD-10-CM

## 2024-03-22 DIAGNOSIS — N17.9 AKI (ACUTE KIDNEY INJURY) (HCC): ICD-10-CM

## 2024-03-22 DIAGNOSIS — I10 PRIMARY HYPERTENSION: ICD-10-CM

## 2024-03-22 DIAGNOSIS — E11.9 TYPE 2 DIABETES MELLITUS WITHOUT RETINOPATHY (HCC): ICD-10-CM

## 2024-03-22 DIAGNOSIS — E87.6 HYPOKALEMIA: ICD-10-CM

## 2024-03-22 DIAGNOSIS — D72.828 OTHER ELEVATED WHITE BLOOD CELL (WBC) COUNT: ICD-10-CM

## 2024-03-22 LAB
APPEARANCE: CLEAR
BILIRUB UR QL: NEGATIVE
BILIRUB UR QL: NEGATIVE
BILIRUBIN: NEGATIVE
GLUCOSE (URINE DIPSTICK): NEGATIVE MG/DL
GLUCOSE UR-MCNC: NORMAL MG/DL
GLUCOSE UR-MCNC: NORMAL MG/DL
KETONES (URINE DIPSTICK): NEGATIVE MG/DL
KETONES UR-MCNC: NEGATIVE MG/DL
KETONES UR-MCNC: NEGATIVE MG/DL
LEUKOCYTE ESTERASE UR QL STRIP.AUTO: 500
LEUKOCYTE ESTERASE UR QL STRIP.AUTO: 500
MULTISTIX LOT#: ABNORMAL NUMERIC
NITRITE UR QL STRIP.AUTO: NEGATIVE
NITRITE UR QL STRIP.AUTO: NEGATIVE
NITRITE, URINE: NEGATIVE
PH UR: 6.5 [PH] (ref 5–8)
PH UR: 6.5 [PH] (ref 5–8)
PH, URINE: 7 (ref 4.5–8)
PROT UR-MCNC: 70 MG/DL
PROT UR-MCNC: 70 MG/DL
PROTEIN (URINE DIPSTICK): >=300 MG/DL
SP GR UR STRIP: 1.01 (ref 1–1.03)
SP GR UR STRIP: 1.01 (ref 1–1.03)
SPECIFIC GRAVITY: 1.01 (ref 1–1.03)
URINE-COLOR: YELLOW
UROBILINOGEN UR STRIP-ACNC: NORMAL
UROBILINOGEN UR STRIP-ACNC: NORMAL
UROBILINOGEN,SEMI-QN: 0.2 MG/DL (ref 0–1.9)
WBC CLUMPS UR QL AUTO: PRESENT /HPF
WBC CLUMPS UR QL AUTO: PRESENT /HPF

## 2024-03-22 PROCEDURE — 3008F BODY MASS INDEX DOCD: CPT | Performed by: INTERNAL MEDICINE

## 2024-03-22 PROCEDURE — 81003 URINALYSIS AUTO W/O SCOPE: CPT | Performed by: INTERNAL MEDICINE

## 2024-03-22 PROCEDURE — 99214 OFFICE O/P EST MOD 30 MIN: CPT | Performed by: PHYSICIAN ASSISTANT

## 2024-03-22 PROCEDURE — 1111F DSCHRG MED/CURRENT MED MERGE: CPT | Performed by: INTERNAL MEDICINE

## 2024-03-22 PROCEDURE — 1160F RVW MEDS BY RX/DR IN RCRD: CPT | Performed by: PHYSICIAN ASSISTANT

## 2024-03-22 PROCEDURE — 3078F DIAST BP <80 MM HG: CPT | Performed by: INTERNAL MEDICINE

## 2024-03-22 PROCEDURE — 3077F SYST BP >= 140 MM HG: CPT | Performed by: INTERNAL MEDICINE

## 2024-03-22 PROCEDURE — 99499 UNLISTED E&M SERVICE: CPT | Performed by: INTERNAL MEDICINE

## 2024-03-22 PROCEDURE — 99495 TRANSJ CARE MGMT MOD F2F 14D: CPT | Performed by: INTERNAL MEDICINE

## 2024-03-22 PROCEDURE — 1111F DSCHRG MED/CURRENT MED MERGE: CPT | Performed by: PHYSICIAN ASSISTANT

## 2024-03-22 PROCEDURE — 1126F AMNT PAIN NOTED NONE PRSNT: CPT | Performed by: INTERNAL MEDICINE

## 2024-03-22 PROCEDURE — 1159F MED LIST DOCD IN RCRD: CPT | Performed by: PHYSICIAN ASSISTANT

## 2024-03-22 RX ORDER — BLOOD-GLUCOSE METER
KIT MISCELLANEOUS
Qty: 1 KIT | Refills: 0 | Status: SHIPPED | OUTPATIENT
Start: 2024-03-22

## 2024-03-22 RX ORDER — LANCETS 30 GAUGE
EACH MISCELLANEOUS
Qty: 50 EACH | Refills: 6 | Status: SHIPPED | OUTPATIENT
Start: 2024-03-22

## 2024-03-22 RX ORDER — FLUTICASONE PROPIONATE AND SALMETEROL 250; 50 UG/1; UG/1
1 POWDER RESPIRATORY (INHALATION) EVERY 12 HOURS SCHEDULED
Qty: 1 EACH | Refills: 5 | Status: SHIPPED | OUTPATIENT
Start: 2024-03-22

## 2024-03-22 NOTE — PROGRESS NOTES
HPI:    Patient ID: Chester Bautista is a 84 year old female.    Chief Complaint   Patient presents with    Hospital F/U     Patient states she is here for hospital follow up     Medication Request     Medication refill         PDate of Admission: 2/23/2024  Date of Discharge:  3-12-24  Discharge Disposition: Home with .      Admitting Diagnosis:   Other pulmonary embolism without acute cor pulmonale, unspecified chronicity (HCC) [I26.99]  GIB     Hospital Discharge Diagnoses: Same.      TCM Diagnosis at discharge from Hospital: Other: GIB; still recommend for TCM follow-up     Risk of readmission: Chester Bautista has High Risk of readmission after discharge from the hospital.     History of Present Illness:      Chester Bautista is a(n) 84 year old lady with past medical history of rheumatoid arthritis, breast cancer status post lumpectomy and radiation in 2016, COPD, type II DM, HLD, HTN, anemia with bleeding AVMs who presented to the emergency room with difficulty breathing.     Patient was admitted 2 weeks prior to presentation with complaints of shortness of breath.  CXR at that time showed opacities consistent with pneumonia versus heart failure and the patient was discharged on antibiotics.  Patient's symptoms did not improve and she continues to have shortness of breath with exertion specially when undergoing PT.  Her O2 saturation dropped down to the 80s patient reported that walking back to her car from iron transfusion her O2 saturation dropped to the low 80s which prompted her to come to the emergency room.  No fever or chills.     CT showed acute pulmonary embolism.  Bilateral lower extremity ultrasound was negative for DVT.  Pulmonology were consulted.  Patient was started on IV heparin drip and admitted to the floor for further assessment and management  PE (pulmonary thromboembolism) (HCC) from right lower lobe pulmonary artery to multiple segmental arteries on CT 2-23-24.    CT without evidence of RV strain.  Without acute cor pulmonale.  Doppler negative for DVT on 2-23-24.   Not sure if due to hospitalization from prior.  Did have SCDs on from prior hospitalization.  Not sure of duration whether will be 6 months or longer due to history of breast cancer.  Discussed with pulmonary critical care.  Status post IVC filter March 7, 2024.  Hold Eliquis.       ABLA (acute blood loss anemia)  History of AVMs.  Status post endoscopy.  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 Multiple gastric and duodenal AVMs. She recently underwent cautery of gastric and proximal duodenal avms in two months ago and now multiple new ones have grown. Suspect she will have chronic AVM disease given its natural course. Utility of prophylactic endoscopic treatment is not clear as she will continue to grow new ones. Suggest medical management,.).  GI following patient.  Hemoglobin improved status posttransfusion 1 unit PRBCs.  Monitor back on Eliquis.  On PPI.  Slight drop in hemoglobin noted this morning.  But no evidence of more blood loss.  Has not had any bowel movement.  May be hydration effect?  Will monitor.  Slight drop but last time this happened 1 recheck was normal.  Not sure if some of that lab?  But even more of a decline today than yesterday.  Had a little bit of a nosebleed this morning.  Has not had a bowel movement for 48 hours.  But last bowel movement she now mentions that it was black.  GI reconsulted.  On PPI.  Monitor hemoglobin.  Slight drop. Small amount with epistaxis yetserday and Bm brownish today after no BM X 72 hrs. GI reconsulted.  Status post IVC filter yesterday March 7, 2024.  Slight drop today also in hemoglobin.  Will monitor.  Transfusion done prior to discharge.  She will need to follow-up with an academic institution for further follow-up regarding   AVM's. On ocretide. Has FU GI.         Elevated white count.  May be due to GI bleed.  But  more elevated than yesterday but overall decreased from initial admission.  Check UA.  Denies any respiratory symptoms.  On steroids but stable dose from prior.  Slight worsening.  Urine culture negative to date.  Chest x-ray shows no acute disease.  But now with diarrhea.   C diff negative. Improving.         Hyperkalemia.  Will hold potassium and spironolactone and losartan.  Low potassium diet.  Will monitor.  Will give some Kayexalate.  Renal to be consulted.  Resolved.        DARWIN.   Not sure if due to dehydration.  Stop diuretics.  Check bladder scan and renal ultrasound and UA.  Avoid nephrotoxins.  Monitor.  Getting light dose of IV flu21[[[[[[[[[[[[[[[[[[[[[[[[[[[[[[[[[[[[[[[[[[[[[[[[[[[[[[[[[[[[[[[[[[[[[[[[[[[[ 68 I do not understand any sense to me quality so you could so mistake 98ids days like this is like get.  Was n.p.o. for EGD.  Plus appetite is diminished.  Check postvoid residual.  Last diet load approximately 6 days ago.  Improving.  Avoid nephrotoxins.  Continue to hold diuretics.  No evidence of fluid overload.  Slightly improved today.         UTI.  On IV antibiotics.  Urine culture positive for Klebsiella aerogenes sensitive to ceftriaxone.  Finished IV antibiotics.  DC'ed.         RA.  Stable on prednisone and hydroxychloroquine.  Follows up with rheum as an outpatient.        Abnormal CT chest.  With bilateral fibrosis and cystic changes.  Pulmonary following patient. Has FU after CT done.          Urinary retention.  Added Flomax.  Straight cath protocol.  Urology consulted.  Patient unwilling to do straight cath and daughter does not want to do.  Explained to patient.  Alonso inserted.  Has FU urology today.          Hypomagnesemia.  Supplement and monitor.         Dizzy, orthostatic positive  Fall precautions.  Light dose of IV fluids due to history of CHF.  Added midodrine.  Discussed about from laying to sitting wait a couple minutes before standing and also from sitting to standing wait  a couple minutes before beginning walking.  Parameters on blood pressure medicines.  And monitor.  Physical therapy did not do stairs due to dizziness. Sill with lightheadedness when gets up.  S/P 1 unit PRBC's.  Stopped nifedipine completely.  Will decrease Coreg.  Monitor. Resolved.            Diarrhea.  MiraLAX was stopped 48 hours ago.  No other agents to cause diarrhea.  C diff negative. Resolved.      DVT prophylaxis off Eliquis due to GI bleed.  On SCDs.  Has IVC filter.    MAGGIE RN taking care of ptatient here for follow up of Diabetes.  Has been taking medications regularly.    Checks sugars 1 times daily to qod.  Fasting sugars average 's.  No lows.   Watches diabetic diet, low salt.  Diabetic Flow sheet      Latest Ref Rng & Units 3/22/2024     6:11 PM 3/22/2024    10:15 AM 3/22/2024    10:08 AM 3/12/2024     9:40 PM 3/12/2024     4:30 PM 5/23/2023    12:00 AM   DIABETIC FLOWSHEET ([facility])   BMI    26.45 kg/m2      Valsartan  80 mg Daily OR 80 mg Daily OR  80 mg Daily OR  Admitted Admitted 80 mg Daily OR    Weight (enc vitals)    140 lb      BP (enc vitals)   157/73 167/67  143/69      HISTORY OF DIABETES COMPLICATIONS: :  History of Retinopathy: Yes.   History of Neuropathy: No.   History of Nephropathy: Yes.      ASSOCIATED COMPLICATIONS:   HTN: Yes.   Hyperlipidemia: Yes.   Coronary Artery Disease:  CAD,  HF, PAD  Cerebrovascular Disease: Yes.      MEALS:  2-3 meals a day  Cooks at home    Hypertension  Patient is here for follow up of hypertension. BP at home: 120/80's thru RIVKA RN.   Has been compliant with medications.  Exercise level: somewhat active and has been following low salt diet.  Weight has been stable. Cooks mor.e   Wt Readings from Last 3 Encounters:   03/22/24 140 lb (63.5 kg)   02/29/24 129 lb 6.4 oz (58.7 kg)   02/23/24 137 lb (62.1 kg)     BP Readings from Last 3 Encounters:   03/22/24 157/73   03/12/24 143/69   02/23/24 118/53     Labs:   Lab Results   Component Value Date/Time    GLU  169 (H) 03/15/2024 03:07 PM     03/15/2024 03:07 PM    K 4.1 03/15/2024 03:07 PM     03/15/2024 03:07 PM    CO2 26.0 03/15/2024 03:07 PM    CREATSERUM 1.18 (H) 03/15/2024 03:07 PM    CA 8.3 (L) 03/15/2024 03:07 PM    AST 47 (H) 03/15/2024 03:07 PM    ALT 41 03/15/2024 03:07 PM    TSH 0.622 10/20/2023 01:38 PM        Lab Results   Component Value Date/Time    CHOLEST 130 02/16/2024 01:43 PM    HDL 43 02/16/2024 01:43 PM    TRIG 181 (H) 02/16/2024 01:43 PM    LDL 57 02/16/2024 01:43 PM    NONHDLC 87 02/16/2024 01:43 PM            Wt Readings from Last 3 Encounters:   03/22/24 140 lb (63.5 kg)   02/29/24 129 lb 6.4 oz (58.7 kg)   02/23/24 137 lb (62.1 kg)     BP Readings from Last 3 Encounters:   03/22/24 157/73   03/12/24 143/69   02/23/24 118/53     Labs:   Lab Results   Component Value Date/Time     (H) 03/15/2024 03:07 PM     03/15/2024 03:07 PM    K 4.1 03/15/2024 03:07 PM     03/15/2024 03:07 PM    CO2 26.0 03/15/2024 03:07 PM    CREATSERUM 1.18 (H) 03/15/2024 03:07 PM    CA 8.3 (L) 03/15/2024 03:07 PM    AST 47 (H) 03/15/2024 03:07 PM    ALT 41 03/15/2024 03:07 PM    TSH 0.622 10/20/2023 01:38 PM        Lab Results   Component Value Date/Time    CHOLEST 130 02/16/2024 01:43 PM    HDL 43 02/16/2024 01:43 PM    TRIG 181 (H) 02/16/2024 01:43 PM    LDL 57 02/16/2024 01:43 PM    NONHDLC 87 02/16/2024 01:43 PM          HPI      Review of Systems   Constitutional:  Negative for activity change, appetite change, chills, diaphoresis, fatigue, fever and unexpected weight change.   HENT:  Negative for drooling, sore throat, trouble swallowing and voice change.    Respiratory:  Negative for apnea, cough, choking, chest tightness, shortness of breath, wheezing and stridor.    Cardiovascular:  Negative for chest pain, palpitations and leg swelling.   Gastrointestinal:  Negative for abdominal distention, abdominal pain, anal bleeding, blood in stool, constipation, diarrhea, nausea, rectal pain  and vomiting.   Endocrine: Negative for cold intolerance, heat intolerance, polydipsia, polyphagia and polyuria.   Genitourinary:  Positive for frequency (Has Alonso.). Negative for decreased urine volume, difficulty urinating, dysuria, flank pain, genital sores, hematuria, menstrual problem, pelvic pain and urgency.   Neurological:  Negative for dizziness, tremors, seizures, syncope, facial asymmetry, speech difficulty, weakness, light-headedness, numbness and headaches.   All other systems reviewed and are negative.        Current Outpatient Medications   Medication Sig Dispense Refill    fluticasone-salmeterol (WIXELA INHUB) 250-50 MCG/ACT Inhalation Aerosol Powder, Breath Activated Inhale 1 puff into the lungs every 12 (twelve) hours. 1 each 5    Lancets Does not apply Misc Dx. E11.9. Checks qam. 50 each 6    Blood Glucose Monitoring Suppl (BLOOD GLUCOSE SYSTEM GILLES) Does not apply Kit Dx. E11.9. Checks qam. 1 kit 0    Blood Glucose Monitoring Suppl w/Device Does not apply Kit Dx. E11.9. Checks qam. 1 kit 0    Blood Gluc Meter Disp-Strips Does not apply Device Dx. E11.9. Checks qam. 100 each 0    gabapentin 100 MG Oral Cap Take 1 capsule (100 mg total) by mouth nightly. 90 capsule 3    carvedilol 12.5 MG Oral Tab Take 1 tablet (12.5 mg total) by mouth 2 (two) times daily with meals. 60 tablet 0    midodrine 2.5 MG Oral Tab Take 1 tablet (2.5 mg total) by mouth in the morning and 1 tablet (2.5 mg total) at noon and 1 tablet (2.5 mg total) in the evening. Hold if BP > 120/80.. 90 tablet 0    octreoTIDe 100 mcg/mL Injection Solution Inject 1 mL (100 mcg total) into the skin every 8 (eight) hours. 90 mL 0    apixaban 5 MG Oral Tab Take 2 tablets (10 mg total) by mouth 2 (two) times daily for 10 days, THEN 1 tablet (5 mg total) 2 (two) times daily. 110 tablet 0    tamsulosin 0.4 MG Oral Cap Take 1 capsule (0.4 mg total) by mouth daily. 30 capsule 0    LORATADINE 10 MG Oral Tab TAKE 1 TABLET BY MOUTH EVERY DAY 90 tablet  1    glipiZIDE 5 MG Oral Tab Take 1.5 tablets (7.5 mg total) by mouth daily with breakfast.      Accu-Chek FastClix Lancets Does not apply Misc Check sugar once daily as directed (E11.42) 100 each 2    hydroxychloroquine 200 MG Oral Tab Take 1 tablet (200 mg total) by mouth daily. 90 tablet 1    Glucose Blood (ACCU-CHEK GUIDE) In Vitro Strip Check blood sugar twice a day (fasting and before dinner). DX: E11.42, NIDDM 200 strip 1    rosuvastatin (CRESTOR) 20 MG Oral Tab Take 1 tablet (20 mg total) by mouth nightly. 90 tablet 0    omeprazole 20 MG Oral Capsule Delayed Release Take 1 capsule (20 mg total) by mouth every morning. 90 capsule 1    cyclobenzaprine 10 MG Oral Tab Take 1 tablet (10 mg total) by mouth nightly as needed for Muscle spasms. 90 tablet 1    glycopyrrolate 1 MG Oral Tab Take 1 tablet (1 mg total) by mouth 3 (three) times daily. 90 tablet 1    albuterol 108 (90 Base) MCG/ACT Inhalation Aero Soln inhale 2 puff by inhalation route  every 4 - 6 hours as needed 1 each 0    predniSONE 5 MG Oral Tab Take 1 tablet (5 mg total) by mouth daily. 90 tablet 1    montelukast 10 MG Oral Tab Take 1 tablet (10 mg total) by mouth nightly. 90 tablet 3    valsartan 80 MG Oral Tab Take 1 tablet (80 mg total) by mouth daily. 90 tablet 3    albuterol 108 (90 Base) MCG/ACT Inhalation Aero Soln inhale 2 puff by inhalation route  every 4 - 6 hours as needed 1 each 5    Blood Glucose Monitoring Suppl (ONETOUCH VERIO) w/Device Does not apply Kit 1 each daily. Test 1x daily 1 kit 0    Glucose Blood (ONETOUCH VERIO) In Vitro Strip Test 1x daily 100 strip 1    OneTouch Delica Lancets 33G Does not apply Misc 4 x daily 100 each 1    Blood Glucose Monitoring Suppl (BLOOD GLUCOSE SYSTEM GILLES) Does not apply Kit DX E 11.9.  Checks every morning. 1 kit 0    Lancets Does not apply Misc DX E 11.9.  Checks every morning. 50 each 11    Blood Glucose Monitoring Suppl w/Device Does not apply Kit DX E 11.9.  Checks every morning. 1 kit 0     Blood Gluc Meter Disp-Strips Does not apply Device DX E 11.9.  Checks every morning. 100 each 11    acetaminophen 500 MG Oral Tab Take 1 tablet (500 mg total) by mouth every 6 (six) hours as needed for Pain.       Allergies:  Allergies   Allergen Reactions    Celebrex [Celecoxib] PALPITATIONS    Penicillins ITCHING and SWELLING    Metformin And Related UNKNOWN    Olmesartan UNKNOWN       HISTORY:  Past Medical History:   Diagnosis Date    Anxiety state     Cancer (HCC)     breast cancer 2018    Chronic obstructive pulmonary disease, unspecified (HCC) 05/04/2023    Chronic obstructive pulmonary disease, unspecified (HCC) 05/04/2023    COPD (chronic obstructive pulmonary disease) (HCC)     Diabetes (HCC)     Essential hypertension     Exposure to medical diagnostic radiation     High blood pressure     High cholesterol     History of blood transfusion 07/2022    low hemoglobin    Osteoarthritis     PONV (postoperative nausea and vomiting)     Pulmonary embolism (HCC)     Pulmonary emphysema (HCC)     Rheumatoid arthritis (HCC)       Past Surgical History:   Procedure Laterality Date    CATARACT EXTRACTION W/  INTRAOCULAR LENS IMPLANT Bilateral 2017    Dr in [facility]     COLONOSCOPY      COLONOSCOPY N/A 07/21/2022    Procedure: COLONOSCOPY;  Surgeon: Mikey Garcia MD;  Location: [facility] ENDOSCOPY    COLONOSCOPY N/A 06/15/2023    Procedure: COLONOSCOPY;  Surgeon: Mikey Garcia MD;  Location: [facility] ENDOSCOPY    CORRECT BUNION,SIMPLE      right foot  1993    EGD  12/21/2023    Dr. Garcia; Duodenal AVM's x4 cauterized    HYSTERECTOMY      1972, no abnormal Paps, due to heavy bleeding with fibroids.  Not sure if it had bilateral salpingo-oophorectomy.    IR ANEURYSM REPAIRM  2010    Computer assisted volumetric craniofomy with clipping of anterior cerebral artery.    LUMPECTOMY RIGHT      RADIATION RIGHT      ROTATOR CUFF REPAIR      1993    TOTAL KNEE REPLACEMENT Right 2010    TOTAL KNEE REPLACEMENT Left 07/02/2015     TRANSORAL INCISIONLESS FUNDOPLICATION - INTERNAL  2012 Fredy fundoplication at [facility] Dr. Fournier.    Fredy fundoplication at [facility] Dr. Fournier.    TRIGGER FINGER RELEASE      left hand      YAG CAPSULOTOMY - OU - BOTH EYES Bilateral 2021    done in [place]      Family History   Problem Relation Age of Onset    Heart Surgery Mother         Leaky valve at 39 y/o.     Prostate Cancer Brother     Cancer Brother     Diabetes Maternal Grandmother     Diabetes Paternal Aunt     Breast Cancer Self 78    Breast Cancer Niece         before 50    Macular degeneration Neg     Glaucoma Neg       Social History:   Social History     Socioeconomic History    Marital status:    Tobacco Use    Smoking status: Former     Packs/day: .25     Types: Cigarettes     Quit date:      Years since quittin.2    Smokeless tobacco: Never    Tobacco comments:     [id number]   Vaping Use    Vaping Use: Never used   Substance and Sexual Activity    Alcohol use: Never    Drug use: Never   Social History Narrative    Just moved in from [place].   passed away and that is why moved from [place] to be with daughter who lives in [place].  Currently lives with daughter.     Social Determinants of Health     Financial Resource Strain: Low Risk  (2024)    Financial Resource Strain     Difficulty of Paying Living Expenses: Not hard at all     Med Affordability: No   Food Insecurity: No Food Insecurity (2024)    Food Insecurity     Food Insecurity: Never true   Transportation Needs: No Transportation Needs (2024)    Transportation Needs     Lack of Transportation: No   Housing Stability: Low Risk  (2024)    Housing Stability     Housing Instability: No        PHYSICAL EXAM:   /73 (BP Location: Left arm, Patient Position: Sitting, Cuff Size: adult)   Pulse 90   Temp 98.2 °F (36.8 °C) (Oral)   Ht 5' 1\" (1.549 m)   Wt 140 lb (63.5 kg)   SpO2 100%    BMI 26.45 kg/m²   BP Readings from Last 3 Encounters:   03/22/24 157/73   03/12/24 143/69   02/23/24 118/53     Wt Readings from Last 3 Encounters:   03/22/24 140 lb (63.5 kg)   02/29/24 129 lb 6.4 oz (58.7 kg)   02/23/24 137 lb (62.1 kg)       Physical Exam  Vitals and nursing note reviewed.   Constitutional:       General: She is not in acute distress.     Appearance: Normal appearance. She is well-developed and well-groomed. She is not ill-appearing, toxic-appearing or diaphoretic.      Interventions: She is not intubated.  HENT:      Mouth/Throat:      Comments: Patient wearing mask.   Did not have patient remove mask due to current Covid virus situation.  Eyes:      General: No scleral icterus.     Conjunctiva/sclera: Conjunctivae normal.   Neck:      Thyroid: No thyroid mass or thyromegaly.      Trachea: Trachea and phonation normal.   Cardiovascular:      Rate and Rhythm: Normal rate and regular rhythm.      Pulses: Normal pulses. No decreased pulses.           Carotid pulses are 2+ on the right side and 2+ on the left side.       Radial pulses are 2+ on the right side and 2+ on the left side.        Dorsalis pedis pulses are 2+ on the right side and 2+ on the left side.        Posterior tibial pulses are 2+ on the right side and 2+ on the left side.      Heart sounds: Normal heart sounds, S1 normal and S2 normal.   Pulmonary:      Effort: Pulmonary effort is normal. No tachypnea, bradypnea, accessory muscle usage, prolonged expiration, respiratory distress or retractions. She is not intubated.      Breath sounds: Normal breath sounds and air entry. No stridor, decreased air movement or transmitted upper airway sounds. No decreased breath sounds, wheezing, rhonchi or rales.   Chest:      Chest wall: No tenderness.   Abdominal:      General: Bowel sounds are normal. There is no distension.      Palpations: Abdomen is soft. Abdomen is not rigid. There is no mass.      Tenderness: There is no abdominal  tenderness. There is no right CVA tenderness, left CVA tenderness, guarding or rebound.   Musculoskeletal:      Right lower leg: No edema.      Left lower leg: No edema.   Lymphadenopathy:      Cervical: No cervical adenopathy.   Skin:     General: Skin is warm and dry.   Neurological:      Mental Status: She is alert and oriented to person, place, and time.   Psychiatric:         Mood and Affect: Mood normal.         Behavior: Behavior normal. Behavior is cooperative.         Thought Content: Thought content normal.              ASSESSMENT/PLAN:     Encounter Diagnoses   Name Primary?    Urinary retention With benson. Has FU urology today.    Yes    Vaccine counseling Recommend shingles vaccine.  There is 2 doses of the vaccine  by 2 months 6 months apart.  Check on insurance coverage on shingles vaccine.  May be covered better at the pharmacy.  Separate shingles vaccine from all of the vaccines by at least 1 to 2 weeks.  Discussed about side effects of vaccine.        Type 2 diabetes mellitus without retinopathy (HCC) Stable. Careful with diet and excercise at least 30 minutes 3-4 times a week. Check sugars at different times on different dates. Careful with low sugars. Carry something with you and check sugar if can. Can carry hanh cracker, etc. Decrease carbohydrates. But also, careful with fruits and natural sugars.One serving a day and no more than 1 handful every day. Check feet  every AM and careful with sores and ulcers on feet bilaterally. Check eyes every year with dilated eye exam.  Check sugars.  2-hour postmeal should be less than 140s.  Pre-meal should be 's.  Both equally affected A1c.  Discussed importance of glycemic control to prevent complications of diabetes  -Discussed complications of diabetes include retinopathy, neuropathy, nephropathy and cardiovascular disease  -Discussed ABCs of DM  -Discussed importance of SBGM  -Discussed importance of low CHO diet, recommend 45gm per  meal or 135gm per day  -UTD with optho       Iron deficiency anemia due to chronic blood loss Chyeck blood.        Primary hypertension Higher but not take meds. today. Call in 2 weeks. Careful with diet and excercise at least 30 minutes 3-4 times a week. Check blood pressures at different times on different days. Can purchase own blood pressure monitor. If not, check at local pharmacy. Bake foods more and grill occasionally. Avoid fried foods. No salt. Use other seasonings.         Hypomagnesemia Resolved.        Hypokalemia Check blood.        Hypercalcemia Check blood.        Hyperkalemia Check blood.        DARWIN (acute kidney injury) (HCC) No motrin, ibuprofen, advil, alleve, naprosyn  with these medications.  Check blood.        Other acute pulmonary embolism without acute cor pulmonale (HCC) Off AC due to GIB.       Acute on chronic blood loss anemia Check blood. FU GI. On ocretide.      AVM (arteriovenous malformation) of duodenum, acquired with hemorrhage        Orders Placed This Encounter   Procedures    Comp Metabolic Panel (14)    CBC With Differential With Platelet    URINALYSIS, AUTO, W/O SCOPE    Urinalysis with Culture Reflex    Urine Culture, Routine       Meds This Visit:  Requested Prescriptions     Signed Prescriptions Disp Refills    fluticasone-salmeterol (WIXELA INHUB) 250-50 MCG/ACT Inhalation Aerosol Powder, Breath Activated 1 each 5     Sig: Inhale 1 puff into the lungs every 12 (twelve) hours.    Lancets Does not apply Misc 50 each 6     Sig: Dx. E11.9. Checks qam.    Blood Glucose Monitoring Suppl (BLOOD GLUCOSE SYSTEM GILLES) Does not apply Kit 1 kit 0     Sig: Dx. E11.9. Checks qam.    Blood Glucose Monitoring Suppl w/Device Does not apply Kit 1 kit 0     Sig: Dx. E11.9. Checks qam.    Blood Gluc Meter Disp-Strips Does not apply Device 100 each 0     Sig: Dx. E11.9. Checks qam.       Imaging & Referrals:  None      RTC for physical.

## 2024-03-22 NOTE — PATIENT INSTRUCTIONS
ASSESSMENT/PLAN:     Encounter Diagnoses   Name Primary?    Urinary retention With benson. Has FU urology today.    Yes    Vaccine counseling Recommend shingles vaccine.  There is 2 doses of the vaccine  by 2 months 6 months apart.  Check on insurance coverage on shingles vaccine.  May be covered better at the pharmacy.  Separate shingles vaccine from all of the vaccines by at least 1 to 2 weeks.  Discussed about side effects of vaccine.        Type 2 diabetes mellitus without retinopathy (HCC) Stable. Careful with diet and excercise at least 30 minutes 3-4 times a week. Check sugars at different times on different dates. Careful with low sugars. Carry something with you and check sugar if can. Can carry hanh cracker, etc. Decrease carbohydrates. But also, careful with fruits and natural sugars.One serving a day and no more than 1 handful every day. Check feet  every AM and careful with sores and ulcers on feet bilaterally. Check eyes every year with dilated eye exam.  Check sugars.  2-hour postmeal should be less than 140s.  Pre-meal should be 's.  Both equally affected A1c.  Discussed importance of glycemic control to prevent complications of diabetes  -Discussed complications of diabetes include retinopathy, neuropathy, nephropathy and cardiovascular disease  -Discussed ABCs of DM  -Discussed importance of SBGM  -Discussed importance of low CHO diet, recommend 45gm per meal or 135gm per day  -UTD with optho       Iron deficiency anemia due to chronic blood loss Chyeck blood.        Primary hypertension Higher but not take meds. today. Call in 2 weeks. Careful with diet and excercise at least 30 minutes 3-4 times a week. Check blood pressures at different times on different days. Can purchase own blood pressure monitor. If not, check at local pharmacy. Bake foods more and grill occasionally. Avoid fried foods. No salt. Use other seasonings.         Hypomagnesemia Resolved.        Hypokalemia Check  blood.        Hypercalcemia Check blood.        Hyperkalemia Check blood.        DARWIN (acute kidney injury) (HCC) No motrin, ibuprofen, advil, alleve, naprosyn  with these medications.  Check blood.        Other acute pulmonary embolism without acute cor pulmonale (HCC) Off AC due to GIB.       Acute on chronic blood loss anemia Check blood. FU GI.      AVM (arteriovenous malformation) of duodenum, acquired with hemorrhage        Orders Placed This Encounter   Procedures    Comp Metabolic Panel (14)    CBC With Differential With Platelet       Meds This Visit:  Requested Prescriptions     Signed Prescriptions Disp Refills    fluticasone-salmeterol (WIXELA INHUB) 250-50 MCG/ACT Inhalation Aerosol Powder, Breath Activated 1 each 5     Sig: Inhale 1 puff into the lungs every 12 (twelve) hours.    Lancets Does not apply Misc 50 each 6     Sig: Dx. E11.9. Checks qam.    Blood Glucose Monitoring Suppl (BLOOD GLUCOSE SYSTEM GILLES) Does not apply Kit 1 kit 0     Sig: Dx. E11.9. Checks qam.    Blood Glucose Monitoring Suppl w/Device Does not apply Kit 1 kit 0     Sig: Dx. E11.9. Checks qam.    Blood Gluc Meter Disp-Strips Does not apply Device 100 each 0     Sig: Dx. E11.9. Checks qam.       Imaging & Referrals:  None      RTC for physical.

## 2024-03-22 NOTE — PROGRESS NOTES
- I was ordered by TYESHA to remove pt catheter.  I entered room, introduced myself, and verified identity with name and .  -  Pt positioned himself on the exam table and lowering his bottom clothing to his ankles. I then placed him in a supine position.  I detached the leg bag and extension tubing from the benson cath, removed the cath from the STAT lock and also removed the STAT lock from his upper inner Rt thigh. I deflated the benson cath balloon of its contents and I gently and slowly removed the benson cath. Pt tolerated this well.   - I explained to pt that once she gets home she should only drink as she usually would with meals and when he is thirsty and not force fluids in case she does not  urinate. I also informed him that urinary retention can be in the form of no urine output at all over an extended period or small amts of urine production very frequently with eventual feeling of bladder pressure and discomfort. I asked pt to call office if unable to urinate, and if office is closed, they should go to ER.  Pt verbalized understanding and compliance.   - Pt redressed and I escorted her and daughter to the lobby.  - encounter complete

## 2024-03-23 NOTE — PROGRESS NOTES
Subjective:   Chester Bautista is a 84 year old female with COPD, HTN, who presents today for follow up urinary retention.     Patient admitted for extended period recently at [facility] for PE among other medical issues. She was discharged approximately 10 days ago with benson catheter due to urinary retention. Urology was consulted during admission for this.    Imaging negative hydronephrosis. She was treated for Klebsiella UTI. Post void residuals were 400-500mL. Normal renal function. Had been constipated also.     Notes that she has been a chronic bladder holding because she doesn't like to void in public places. Able to hold her bladder for 6+ hours without voiding.   No prior benson catheter/retention.     History/Other:    Chief Complaint Reviewed and Verified  Nursing Notes Reviewed and   Verified  Tobacco Reviewed  Allergies Reviewed  Medications Reviewed    Medical History Reviewed  Surgical History Reviewed  Family History   Reviewed  Social History Reviewed         Current Outpatient Medications   Medication Sig Dispense Refill    fluticasone-salmeterol (WIXELA INHUB) 250-50 MCG/ACT Inhalation Aerosol Powder, Breath Activated Inhale 1 puff into the lungs every 12 (twelve) hours. 1 each 5    Lancets Does not apply Misc Dx. E11.9. Checks qam. 50 each 6    Blood Glucose Monitoring Suppl (BLOOD GLUCOSE SYSTEM GILLES) Does not apply Kit Dx. E11.9. Checks qam. 1 kit 0    Blood Glucose Monitoring Suppl w/Device Does not apply Kit Dx. E11.9. Checks qam. 1 kit 0    Blood Gluc Meter Disp-Strips Does not apply Device Dx. E11.9. Checks qam. 100 each 0    gabapentin 100 MG Oral Cap Take 1 capsule (100 mg total) by mouth nightly. 90 capsule 3    carvedilol 12.5 MG Oral Tab Take 1 tablet (12.5 mg total) by mouth 2 (two) times daily with meals. 60 tablet 0    midodrine 2.5 MG Oral Tab Take 1 tablet (2.5 mg total) by mouth in the morning and 1 tablet (2.5 mg total) at noon and 1 tablet (2.5 mg total) in  the evening. Hold if BP > 120/80.. 90 tablet 0    octreoTIDe 100 mcg/mL Injection Solution Inject 1 mL (100 mcg total) into the skin every 8 (eight) hours. 90 mL 0    apixaban 5 MG Oral Tab Take 2 tablets (10 mg total) by mouth 2 (two) times daily for 10 days, THEN 1 tablet (5 mg total) 2 (two) times daily. 110 tablet 0    tamsulosin 0.4 MG Oral Cap Take 1 capsule (0.4 mg total) by mouth daily. 30 capsule 0    LORATADINE 10 MG Oral Tab TAKE 1 TABLET BY MOUTH EVERY DAY 90 tablet 1    glipiZIDE 5 MG Oral Tab Take 1.5 tablets (7.5 mg total) by mouth daily with breakfast.      Accu-Chek FastClix Lancets Does not apply Misc Check sugar once daily as directed (E11.42) 100 each 2    hydroxychloroquine 200 MG Oral Tab Take 1 tablet (200 mg total) by mouth daily. 90 tablet 1    Glucose Blood (ACCU-CHEK GUIDE) In Vitro Strip Check blood sugar twice a day (fasting and before dinner). DX: E11.42, NIDDM 200 strip 1    rosuvastatin (CRESTOR) 20 MG Oral Tab Take 1 tablet (20 mg total) by mouth nightly. 90 tablet 0    omeprazole 20 MG Oral Capsule Delayed Release Take 1 capsule (20 mg total) by mouth every morning. 90 capsule 1    cyclobenzaprine 10 MG Oral Tab Take 1 tablet (10 mg total) by mouth nightly as needed for Muscle spasms. 90 tablet 1    glycopyrrolate 1 MG Oral Tab Take 1 tablet (1 mg total) by mouth 3 (three) times daily. 90 tablet 1    albuterol 108 (90 Base) MCG/ACT Inhalation Aero Soln inhale 2 puff by inhalation route  every 4 - 6 hours as needed 1 each 0    predniSONE 5 MG Oral Tab Take 1 tablet (5 mg total) by mouth daily. 90 tablet 1    montelukast 10 MG Oral Tab Take 1 tablet (10 mg total) by mouth nightly. 90 tablet 3    valsartan 80 MG Oral Tab Take 1 tablet (80 mg total) by mouth daily. 90 tablet 3    albuterol 108 (90 Base) MCG/ACT Inhalation Aero Soln inhale 2 puff by inhalation route  every 4 - 6 hours as needed 1 each 5    Blood Glucose Monitoring Suppl (ONETOUCH VERIO) w/Device Does not apply Kit 1 each  daily. Test 1x daily 1 kit 0    Glucose Blood (ONETOUCH VERIO) In Vitro Strip Test 1x daily 100 strip 1    OneTouch Delica Lancets 33G Does not apply Misc 4 x daily 100 each 1    Blood Glucose Monitoring Suppl (BLOOD GLUCOSE SYSTEM GILLES) Does not apply Kit DX E 11.9.  Checks every morning. 1 kit 0    Lancets Does not apply Misc DX E 11.9.  Checks every morning. 50 each 11    Blood Glucose Monitoring Suppl w/Device Does not apply Kit DX E 11.9.  Checks every morning. 1 kit 0    Blood Gluc Meter Disp-Strips Does not apply Device DX E 11.9.  Checks every morning. 100 each 11    acetaminophen 500 MG Oral Tab Take 1 tablet (500 mg total) by mouth every 6 (six) hours as needed for Pain.         Review of Systems:  Pertinent items are noted in HPI.      Objective:   There were no vitals taken for this visit. Estimated body mass index is 26.45 kg/m² as calculated from the following:    Height as of an earlier encounter on 3/22/24: 5' 1\" (1.549 m).    Weight as of an earlier encounter on 3/22/24: 140 lb (63.5 kg).    No distress  Non labored respirations  Alonso draining clear yellow    Laboratory Data:  Lab Results   Component Value Date    WBC 15.3 (H) 03/15/2024    HGB 10.1 (L) 03/15/2024    .0 03/15/2024     Lab Results   Component Value Date     03/15/2024    K 4.1 03/15/2024     03/15/2024    CO2 26.0 03/15/2024    BUN 27 (H) 03/15/2024     (H) 03/15/2024    GFRAA 54 (L) 05/02/2022    AST 47 (H) 03/15/2024    ALT 41 03/15/2024    TP 7.0 03/15/2024    ALB 3.6 03/15/2024    CA 8.3 (L) 03/15/2024    MG 1.7 03/18/2024       Urinalysis Results (last three years):  Recent Labs     12/05/22  1838 12/05/22  1840 12/20/22  1449 12/20/22  1504 03/10/23  1548 03/10/23  1551 02/02/24  1437 02/24/24  1302 02/25/24  0759 02/29/24  2333 03/06/24  1903 03/22/24  1112 03/22/24  1114   COLORUR  --  Yellow  --  Yellow  --   --  Yellow Yellow Yellow Light-Yellow Light-Yellow  --  Light-Yellow   CLARITY  --  Cloudy*   --  Slightly Cloudy*  --   --  Turbid* Ex.Turbid* Ex.Turbid* Clear Clear  --  Turbid*   SPECGRAVITY 1.020 1.015 1.015 1.010  --  1.020 1.012 1.011 1.012 1.009 1.015 1.010 1.008   PHURINE 5.5 5.0 6.5 6.5  --  5.5 6.0 6.0 6.5 5.0 5.5 7.0 6.5   PROUR  --  100*  --  Negative  --   --  300* 30* 50* 30* 50*  --  70*   GLUUR  --  Negative  --  Negative  --   --  Normal Normal Normal Normal Normal  --  Normal   KETUR  --  Negative  --  Negative  --   --  Negative Negative Negative Negative Negative  --  Negative   BILUR  --  Negative  --  Negative  --   --  Negative Negative Negative Negative Negative  --  Negative   BLOODURINE  --  Negative  --  Negative  --   --  3+* 1+* 1+* Negative 1+*  --  1+*   NITRITE Negative Negative Negative Negative  --  Negative Negative Negative Negative Negative Negative Negative Negative   UROBILINOGEN  --  <2.0  --  0.2  --   --  Normal Normal Normal Normal Normal  --  Normal   LEUUR  --  Large*  --  Small*  --   --  500* 500* 500* Negative 250*  --  500*   UASA  --  Negative  --   --   --   --   --   --   --   --   --   --   --    WBCUR  --  >50*  --  6-10*  6-10* 21-50*  --  >50* >50* >50* 1-5 11-20*  --  21-50*   RBCUR  --  3-5*  --  0-2  0-2 0-2  --  >10* >10* 3-5* 0-2 >10*  --  3-5*   BACUR  --  1+*  --  3+*  3+* 3+*  --  3+* 1+* 3+* Rare* None Seen  --  2+*       Urine Culture Results (last three years):  Lab Results   Component Value Date    URINECUL No Growth at 18-24 hrs. 03/06/2024    URINECUL (A) 02/25/2024     >100,000 CFU/ML Klebsiella (Enterobacter) aerogenes    URINECUL No Growth at 18-24 hrs. 02/03/2024    URINECUL >100,000 CFU/ML Enterococcus faecalis (A) 03/30/2023    URINECUL >100,000 CFU/ML Escherichia coli (A) 03/10/2023    URINECUL No Growth at 18-24 hrs. 12/20/2022    URINECUL >100,000 CFU/ML Escherichia coli (A) 12/05/2022    URINECUL 10,000 - 50,000 CFU/ML Escherichia coli (A) 12/05/2022       Imaging  IR IVC FILTER PROCEDURE    Result Date: 3/7/2024  PROCEDURE:  Inferior vena cavogram with placement of an inferior vena cava filter PROCEDURE DATE: 3/7/2024 : MD Carey CLINICAL INDICATION: 84-year-old woman with acute pulmonary embolism and contraindication to systemic anticoagulation. SEDATION: Local anesthetic only. FLUOROSCOPY: 2.1min, 58mGy, 9201mGy*cm^2 CONTRAST: 20 mL Isovue TECHNIQUE AND FINDINGS: Following discussion of the indications, risks, benefits and alternatives to the procedure, written informed consent was obtained. The patient was transferred to the interventional radiology suite and placed in a supine position on the fluoroscopic imaging table. The was right side of the neck was prepped and draped in sterile fashion. 2% lidocaine was administered for local anesthesia. Under real-time sonographic guidance, the right internal jugular vein was accessed using a 5-Fr micropuncture kit. A 0.035\" guidewire was advanced through the sheath into the inferior vena cava under fluoroscopic guidance.  Exchange was made for a 5 Italian Sos Omni catheter, which was advanced into the left common iliac vein under fluoroscopic guidance. Inferior vena cavography was performed, demonstrating a solitary, patent inferior vena cava of normal caliber. Exchange was made for the filter delivery sheath.  An Argon Elite IVC filter was successfully deployed in an infrarenal position without significant tilt. The sheath was removed.  Hemostasis was achieved with direct manual pressure, and a sterile dressing was applied. The patient tolerated the procedure without complication and left the department in satisfactory condition. IMPRESSION: Successful inferior vena cavography demonstrating a solitary, patent inferior vena cava of normal caliber. Successful placement of a Argon Elite potentially retrievable inferior vena cava filter. The patient is to return for IVC filter retrieval in 3-6 months or once systemic anticoagulation is initiated. JENN HURD MD I have reviewed  this study and agree with the above. 3/7/2024 10:16 AM    XR CHEST PA + LAT CHEST (CPT=71046)    Result Date: 3/6/2024  PROCEDURE: XR CHEST PA + LAT CHEST (CPT=71046)  COMPARISON: [facility], CT CHEST PE AORTA (IV ONLY) (CPT=71260), 2/23/2024, 4:45 PM.  [facility], XR CHEST AP PORTABLE (CPT=71045), 2/23/2024, 1:31 PM.  INDICATIONS: Follow up elevated wbc count.  History of breast cancer.  Emphysema.  TECHNIQUE:   Two views.   FINDINGS: CARDIAC/VASC: Heart size and pulmonary vascularity normal. MEDIAST/DYLON: No visible mass or adenopathy. LUNGS/PLEURA: Suboptimal inspiration.  Emphysematous changes with bandlike scarring in the mid and upper lungs.  No consolidation or pleural effusion. BONES: Mild chronic anterior wedging of mid thoracic vertebral bodies.  Degenerative changes in the spine. OTHER: Right Port-A-Cath has the distal 2 cm of locked back on itself in the SVC with the tip pointing cephalad.         CONCLUSION:  1. Emphysema with linear scarring and or atelectasis in the mid and upper lungs.  No evidence of pneumonia. 2. Right Port-A-Cath with distal 2 cm kink back on itself and the tip pointing cephalad.     Dictated by (CST): Michael Gonzalez MD on 3/06/2024 at 4:20 PM     Finalized by (CST): Michael Gonzalez MD on 3/06/2024 at 4:25 PM          CT BRAIN OR HEAD (68633)    Result Date: 3/3/2024  PROCEDURE: CT BRAIN OR HEAD (CPT=70450)  COMPARISON: [facility], CTA BRAIN (CPT=70496), 12/09/2021, 3:11 PM.  [facility], CT BRAIN OR HEAD (CPT=70450), 12/03/2021, 2:50 PM.  INDICATIONS: Headache.  TECHNIQUE: CT images were obtained without contrast material.  Automated exposure control for dose reduction was used.  Dose information is transmitted to the ACR (American College of Radiology) NRDR (National Radiology Data Registry) which includes the Dose Index Registry.  FINDINGS:  CSF SPACES: No hydrocephalus, subarachnoid hemorrhage, or effacement of the  basal cisterns is appreciated. There is no extra-axial fluid collection. CEREBRUM: No acute intraparenchymal hemorrhage, edema, or cortical sulcal effacement is apparent. There is no space-occupying lesion, mass effect, or shift of midline structures. The gray-white matter junction is preserved and bilaterally symmetric in appearance. CEREBELLUM: No edema, hemorrhage, mass, or acute infarction is seen. BRAINSTEM: No edema, hemorrhage, mass, or acute infarction is seen.  CALVARIUM: Postoperative changes of frontal craniotomy and anterior parafalcine aneurysm clipping are identified.  Please note that streak artifacts from aneurysm clips slightly limit evaluation. SINUSES: Trace right mastoid tip effusion versus incomplete pneumatization, unchanged. ORBITS: Limited views are notable for postoperative changes of the lenses bilaterally. OTHER: Atherosclerotic vascular calcifications are perceived in the carotid siphons and distal vertebral arteries.          CONCLUSION:  1. No acute intracranial process. 2. Stable postoperative changes from a remote frontal craniotomy and anterior parafalcine aneurysm clipping. 3. Stable mild chronic microangiopathic ischemic changes with large vessel calcific atherosclerosis.    Dictated by (CST): Abraham Mar MD on 3/03/2024 at 1:51 PM     Finalized by (CST): Abraham Mar MD on 3/03/2024 at 1:53 PM          XR ABDOMEN (1 VIEW) (CPT=74018)    Result Date: 3/1/2024  PROCEDURE: XR ABDOMEN (1 VIEW) (CPT=74018)  COMPARISON: None.  INDICATIONS: constipation.  TECHNIQUE:   Single view.   FINDINGS:  BOWEL GAS PATTERN: Moderate-large amount of stool predominantly in the left colon consistent with constipation/fecal retention.  Nonobstructive abdominal gas pattern. SOFT TISSUES: Normal.  No masses or organomegaly.  CALCIFICATIONS: Vascular calcifications noted. BONES: S-shaped scoliosis dorsal lumbar spine multilevel spondylosis.  Bilateral hip osteoarthritis OTHER: Numerous cardiac  monitoring leads and electronic devices overlie the upper abdomen.         CONCLUSION:  1. Moderate-large amount of stool predominantly in the left colon consistent constipation/fecal retention 2. Nonspecific nonobstructive abdominal gas pattern. 3. Vascular calcifications noted.    Dictated by (CST): Louis Moreno MD on 3/01/2024 at 2:10 PM     Finalized by (CST): Louis Moreno MD on 3/01/2024 at 2:14 PM          US KIDNEY/BLADDER (CPT=76770)    Result Date: 3/1/2024  PROCEDURE: US KIDNEY/BLADDER (CPT=76770)  COMPARISON: [facility], US KIDNEY W DOPPLER (JKG=[id number]), 2/03/2024, 7:08 AM.  INDICATIONS: DARWIN  TECHNIQUE: Ultrasound examination was performed to visualize the kidneys and bladder.   FINDINGS:  RIGHT KIDNEY: 9 cm.  No hydronephrosis.  2 millimeter twinkle artifact in the inter pole.  No mass LEFT KIDNEY: 9.5 cm.  No hydronephrosis.  Simple 3.9 x 3.7 centimeter interpolar cyst.  No mass BLADDER: Bladder contains 555 cc of urine.  No mass or calculus or hematoma.  Bilateral jets are visible .   CONCLUSION: No hydronephrosis     DICTATED BY (CST): LUIS F RANDHAWA MD ON 3/01/2024 AT 12:28 PM     FINALIZED BY (CST): LUIS F RANDHAWA MD ON 3/01/2024 AT 12:30 PM             US VENOUS DOPPLER LEG BILAT - DIAG IMG (CPT=93970)    Result Date: 2/23/2024  PROCEDURE: US VENOUS DOPPLER LEG BILAT-DIAG IMG (CPT=93970)  COMPARISON: [facility], US VENOUS DOPPLER LEG RIGHT-DIAG IMG (CPT=93971), 2/02/2024, 2:52 PM.  INDICATIONS: Evaluate for clot  TECHNIQUE: Color duplex Doppler venous ultrasound of both lower extremities was performed in the  usual manner.  FINDINGS:   The femoral and popliteal veins appear normal.  Normal flow was demonstrated with color and pulsed Doppler.  Visualized portions of the great and small saphenous, posterior tibial and peroneal veins appear normal.    THROMBI: None visible. COMPRESSIBILITY:   Normal. OTHER: Negative.         CONCLUSION: No evidence of  acute deep venous thrombosis in the imaged veins of the bilateral lower extremities.     [facility]   Dictated by (CST): Awais Munson MD on 2/23/2024 at 8:40 PM     Finalized by (CST): Awais Munson MD on 2/23/2024 at 8:41 PM          CT CHEST PE AORTA (IV ONLY) (CPT=71260)    Result Date: 2/23/2024  PROCEDURE: CT CHEST PE AORTA (IV ONLY) (CPT=71260)  COMPARISON: [facility], CT CHEST(CONTRAST ONLY) (CPT=71260), 3/09/2023, 1:42 PM.  INDICATIONS: Sob  TECHNIQUE: CT images of the chest were obtained with non-ionic intravenous contrast material.  Automated exposure control for dose reduction was used. Adjustment of the mA and/or kV was done based on the patient's size. Use of iterative reconstruction technique for dose reduction was used. Dose information is transmitted to the ACR (American College of Radiology) NRDR (National Radiology Data Registry) which includes the Dose Index Registry.  FINDINGS:   VASCULATURE: This is a high quality study for evaluation for pulmonary embolism.  There is PE from the level of the right lower lobar artery to the level of right lower lobe segmental arteries.  The main pulmonary artery is dilated measuring 3.2 cm, similar to the prior exam.   SUPPORT DEVICES: None. CARDIAC: Coronary calcifications cannot be assessed.  No right heart enlargement.  Mild annular valvular calcifications. THORACIC AORTA: Normal size for age.  No aneurysm or dissection.  Moderate calcified and low-attenuation atherosclerosis.  MEDIASTINUM/DYLON: No mass or enlarged adenopathy.  ESOPHAGUS: Unremarkable. NECK BASE: Bilateral thyroid nodules are stable compared to the prior exam. CHEST WALL: No suspicious axillary lymph nodes. BONES: No acute fracture.  Moderate spondylosis. DIAPHRAGM: Small hiatal hernia. UPPER ABDOMEN: Accessory right hepatic lobe artery is noted originating from the SMA. PLEURA: No mass or effusion. LUNGS: The trachea and central airways are clear.  Redemonstration of  fibrosis and cystic changes in the right middle lobe and lingula.  There is suspected superimposed infection in the lingula with parenchymal thickening and consolidation series 4, image 53.          CONCLUSION:   Acute pulmonary embolus extending from the right lower lobar pulmonary artery to to multiple segmental arteries.  Overall low clot burden.  No CT evidence of right heart strain.  Bilateral upper lobe fibrosis and cystic changes.  Suspected superimposed infection in the left upper lobe.  These findings were communicated with Dr. Alex Álvarez by Dr. Awais Munson at 1719 hours using telephone on 02/23/2024.      [facility]   Dictated by (CST): Awais Munson MD on 2/23/2024 at 5:07 PM     Finalized by (CST): Awais Munson MD on 2/23/2024 at 5:19 PM          XR CHEST AP PORTABLE  (CPT=71045)    Result Date: 2/23/2024         PROCEDURE: XR CHEST AP PORTABLE  (CPT=71045) TIME: 1331  COMPARISON: [facility], XR CHEST AP PORTABLE (CPT=71045), 2/03/2024, 2:28 PM.  INDICATIONS: Shortness of breath for one week.  TECHNIQUE:   Single view.   Findings and impression:  Normal heart size.  No edema  The distal 1.5 centimeter portion of the renea catheter remains malposition in the azygos vein   Normal lung volumes  Decreased mild ill-defined bilateral opacities involving the mid to lower lungs  No new pulmonary opacity  No pneumothorax or pleural effusion   Dictated by (CST): Alec Davis MD on 2/23/2024 at 1:46 PM     Finalized by (CST): Alec Davis MD on 2/23/2024 at 1:47 PM            Assessment & Plan:   Urinary retention    Reviewed with patient and daughter potential contributing factors to recent retention including baseline incomplete emptying of bladder, constipation, infection, and deconditioning. Patient requests VT today. Reviewed considerations and indication to be seen at ED.   Follow up in 2 weeks for UA/PVR check, sooner, if needed.     The above impression and plan were discussed in detail with  the patient who verbalized understanding and all questions were answered.  A total of 30 minutes were spent on the visit including chart review in preparation for the visit, time with the patient, placing orders and communication with other providers where appropriate.      Marizol Fitch PA-C, 3/22/2024

## 2024-03-25 ENCOUNTER — OFFICE VISIT (OUTPATIENT)
Dept: HEMATOLOGY/ONCOLOGY | Facility: HOSPITAL | Age: 84
End: 2024-03-25
Attending: INTERNAL MEDICINE
Payer: MEDICARE

## 2024-03-25 ENCOUNTER — TELEPHONE (OUTPATIENT)
Dept: INTERNAL MEDICINE CLINIC | Facility: CLINIC | Age: 84
End: 2024-03-25

## 2024-03-25 VITALS
OXYGEN SATURATION: 94 % | BODY MASS INDEX: 25.86 KG/M2 | HEIGHT: 61 IN | HEART RATE: 93 BPM | TEMPERATURE: 98 F | RESPIRATION RATE: 16 BRPM | WEIGHT: 137 LBS | DIASTOLIC BLOOD PRESSURE: 78 MMHG | SYSTOLIC BLOOD PRESSURE: 179 MMHG

## 2024-03-25 DIAGNOSIS — I26.99 OTHER ACUTE PULMONARY EMBOLISM WITHOUT ACUTE COR PULMONALE (HCC): ICD-10-CM

## 2024-03-25 DIAGNOSIS — K90.9 MALABSORPTION OF IRON (HCC): ICD-10-CM

## 2024-03-25 DIAGNOSIS — Z45.2 ENCOUNTER FOR CARE RELATED TO VASCULAR ACCESS PORT: Primary | ICD-10-CM

## 2024-03-25 DIAGNOSIS — E83.42 HYPOMAGNESEMIA: ICD-10-CM

## 2024-03-25 DIAGNOSIS — D62 ACUTE ON CHRONIC BLOOD LOSS ANEMIA: ICD-10-CM

## 2024-03-25 DIAGNOSIS — Z95.828 PRESENCE OF IVC FILTER: ICD-10-CM

## 2024-03-25 DIAGNOSIS — K31.811 AVM (ARTERIOVENOUS MALFORMATION) OF DUODENUM, ACQUIRED WITH HEMORRHAGE: ICD-10-CM

## 2024-03-25 DIAGNOSIS — Q27.30 AVM (ARTERIOVENOUS MALFORMATION) (HCC): ICD-10-CM

## 2024-03-25 DIAGNOSIS — D50.0 IRON DEFICIENCY ANEMIA DUE TO CHRONIC BLOOD LOSS: Primary | ICD-10-CM

## 2024-03-25 DIAGNOSIS — E87.6 HYPOKALEMIA: ICD-10-CM

## 2024-03-25 DIAGNOSIS — D72.829 LEUKOCYTOSIS, UNSPECIFIED TYPE: ICD-10-CM

## 2024-03-25 DIAGNOSIS — D50.0 IRON DEFICIENCY ANEMIA DUE TO CHRONIC BLOOD LOSS: ICD-10-CM

## 2024-03-25 DIAGNOSIS — E83.52 HYPERCALCEMIA: ICD-10-CM

## 2024-03-25 DIAGNOSIS — N17.9 AKI (ACUTE KIDNEY INJURY) (HCC): ICD-10-CM

## 2024-03-25 DIAGNOSIS — E87.5 HYPERKALEMIA: ICD-10-CM

## 2024-03-25 LAB
ALBUMIN SERPL-MCNC: 3.5 G/DL (ref 3.2–4.8)
ALBUMIN/GLOB SERPL: 1.1 {RATIO} (ref 1–2)
ALP LIVER SERPL-CCNC: 89 U/L
ALT SERPL-CCNC: 28 U/L
ANION GAP SERPL CALC-SCNC: 5 MMOL/L (ref 0–18)
AST SERPL-CCNC: 23 U/L (ref ?–34)
BASOPHILS # BLD AUTO: 0.05 X10(3) UL (ref 0–0.2)
BASOPHILS NFR BLD AUTO: 0.4 %
BILIRUB SERPL-MCNC: 0.2 MG/DL (ref 0.2–1.1)
BUN BLD-MCNC: 13 MG/DL (ref 9–23)
BUN/CREAT SERPL: 14.8 (ref 10–20)
CALCIUM BLD-MCNC: 8.7 MG/DL (ref 8.7–10.4)
CHLORIDE SERPL-SCNC: 109 MMOL/L (ref 98–112)
CO2 SERPL-SCNC: 26 MMOL/L (ref 21–32)
CREAT BLD-MCNC: 0.88 MG/DL
DEPRECATED RDW RBC AUTO: 55.2 FL (ref 35.1–46.3)
EGFRCR SERPLBLD CKD-EPI 2021: 64 ML/MIN/1.73M2 (ref 60–?)
EOSINOPHIL # BLD AUTO: 0.11 X10(3) UL (ref 0–0.7)
EOSINOPHIL NFR BLD AUTO: 0.9 %
ERYTHROCYTE [DISTWIDTH] IN BLOOD BY AUTOMATED COUNT: 17.1 % (ref 11–15)
GLOBULIN PLAS-MCNC: 3.3 G/DL (ref 2.8–4.4)
GLUCOSE BLD-MCNC: 310 MG/DL (ref 70–99)
HCT VFR BLD AUTO: 29.1 %
HGB BLD-MCNC: 9.4 G/DL
IMM GRANULOCYTES # BLD AUTO: 0.19 X10(3) UL (ref 0–1)
IMM GRANULOCYTES NFR BLD: 1.5 %
LYMPHOCYTES # BLD AUTO: 0.68 X10(3) UL (ref 1–4)
LYMPHOCYTES NFR BLD AUTO: 5.5 %
MAGNESIUM SERPL-MCNC: 1 MG/DL (ref 1.6–2.6)
MCH RBC QN AUTO: 30.1 PG (ref 26–34)
MCHC RBC AUTO-ENTMCNC: 32.3 G/DL (ref 31–37)
MCV RBC AUTO: 93.3 FL
MONOCYTES # BLD AUTO: 0.59 X10(3) UL (ref 0.1–1)
MONOCYTES NFR BLD AUTO: 4.8 %
NEUTROPHILS # BLD AUTO: 10.74 X10 (3) UL (ref 1.5–7.7)
NEUTROPHILS # BLD AUTO: 10.74 X10(3) UL (ref 1.5–7.7)
NEUTROPHILS NFR BLD AUTO: 86.9 %
OSMOLALITY SERPL CALC.SUM OF ELEC: 302 MOSM/KG (ref 275–295)
PLATELET # BLD AUTO: 333 10(3)UL (ref 150–450)
POTASSIUM SERPL-SCNC: 3.5 MMOL/L (ref 3.5–5.1)
PROT SERPL-MCNC: 6.8 G/DL (ref 5.7–8.2)
RBC # BLD AUTO: 3.12 X10(6)UL
SODIUM SERPL-SCNC: 140 MMOL/L (ref 136–145)
WBC # BLD AUTO: 12.4 X10(3) UL (ref 4–11)

## 2024-03-25 PROCEDURE — 80053 COMPREHEN METABOLIC PANEL: CPT

## 2024-03-25 PROCEDURE — 83735 ASSAY OF MAGNESIUM: CPT

## 2024-03-25 PROCEDURE — 99215 OFFICE O/P EST HI 40 MIN: CPT | Performed by: INTERNAL MEDICINE

## 2024-03-25 PROCEDURE — 85025 COMPLETE CBC W/AUTO DIFF WBC: CPT

## 2024-03-25 PROCEDURE — 36591 DRAW BLOOD OFF VENOUS DEVICE: CPT

## 2024-03-25 RX ORDER — PREDNISONE 5 MG/1
5 TABLET ORAL DAILY
Qty: 90 TABLET | Refills: 1 | Status: SHIPPED | OUTPATIENT
Start: 2024-03-25

## 2024-03-25 RX ORDER — SODIUM CHLORIDE 9 MG/ML
10 INJECTION, SOLUTION INTRAMUSCULAR; INTRAVENOUS; SUBCUTANEOUS ONCE
OUTPATIENT
Start: 2024-04-08

## 2024-03-25 RX ORDER — HEPARIN SODIUM (PORCINE) LOCK FLUSH IV SOLN 100 UNIT/ML 100 UNIT/ML
5 SOLUTION INTRAVENOUS ONCE
Status: COMPLETED | OUTPATIENT
Start: 2024-03-25 | End: 2024-03-25

## 2024-03-25 RX ORDER — HEPARIN SODIUM (PORCINE) LOCK FLUSH IV SOLN 100 UNIT/ML 100 UNIT/ML
5 SOLUTION INTRAVENOUS ONCE
OUTPATIENT
Start: 2024-04-08

## 2024-03-25 RX ADMIN — HEPARIN SODIUM (PORCINE) LOCK FLUSH IV SOLN 100 UNIT/ML 500 UNITS: 100 SOLUTION INTRAVENOUS at 14:40:00

## 2024-03-25 NOTE — PROGRESS NOTES
CMP Normal (electrolytes, kidney and liver functions),   Magnesium level is still low.  Would increase magnesium to 3 times daily or 3 times a day and recheck in 5 days.  Orders written.

## 2024-03-25 NOTE — TELEPHONE ENCOUNTER
Per Dr. Wang's note today:    2.) Hypomagnesia   - rec TID use of OTC magnesium oxide given by her PCP; discussed w/ her PCP. Pt is asymptomatic and Dr. Sorto is planning to check her levels again later this week

## 2024-03-25 NOTE — PROGRESS NOTES
Cancer Center Progress Note    Patient Name: Chester Bautista   YOB: 1940   Medical Record Number: [id number]     Chief Complaint:  BUZZ, IV iron  Breast cancer    Oncology History:  84 year old with chronic iron deficiency anemia in the setting of bleeding AVMs s/p repeat endoscopic treatment, Iv iron treatment and recent RBC transfusion for hgb of 6 on 5/2022. EGD/CLN Dr Garcia 7/2022 noted for 2 AVM in duodenum and 1 AVM in the colon s/p treatment.  There is ongoing concern for more AVMs as well need for chronic monitoring, infusions etc.      History is noted for RA managed by Dr Goodrich, hepatitis B core antibody positivity, and remote history of breast cancer (records not available).     Pt had an infusion 2/2023 injectafer.     5/19/23: reported to ED due to rectal bleeding and abd pain, has CLN planned.    3/25/2024  Interim  Patient presents for f/u of BUZZ s/p receiving some IV iron in December. Prudencio has symptoms of fatigue and dyspnea, but no chest pain. She was last evaluated by me in early 2/24 rec for ER eval in light of the critically HTN. She was treated for pneumonia. Prudencio was then found to have PE and GI bleeding. She was treated with IV heparin and required cauterization twice for GI bleeding, so never started on Apixaban.     Patient denies blood loss from any orifice that she can see. Prudencio underwent bidirectional scoping with Dr. Aundrea Garcia in GI in June 2023 were several areas of AVM were cauterized.     She denies any neurological symptoms or heart palpitations endorsed in light of her low Mg level found today by PCP's labs request.     Past Medical History: history of breast cancer, BUZZ, bleeding AVM, diabetes, HTN, HLD, RA  Past Surgical History: Hysterectomy 1972, IR aneurysm repair, right lumpectomy, knee replacements  Family History: neg for malignancy  Social History: lives with naif, able to do all ADLs, no smoking or ETOH    Current Outpatient Medications:      predniSONE 5 MG Oral Tab, Take 1 tablet (5 mg total) by mouth daily., Disp: 90 tablet, Rfl: 1    fluticasone-salmeterol (WIXELA INHUB) 250-50 MCG/ACT Inhalation Aerosol Powder, Breath Activated, Inhale 1 puff into the lungs every 12 (twelve) hours., Disp: 1 each, Rfl: 5    Lancets Does not apply Misc, Dx. E11.9. Checks qam., Disp: 50 each, Rfl: 6    Blood Glucose Monitoring Suppl (BLOOD GLUCOSE SYSTEM GILLES) Does not apply Kit, Dx. E11.9. Checks qam., Disp: 1 kit, Rfl: 0    Blood Glucose Monitoring Suppl w/Device Does not apply Kit, Dx. E11.9. Checks qam., Disp: 1 kit, Rfl: 0    Blood Gluc Meter Disp-Strips Does not apply Device, Dx. E11.9. Checks qam., Disp: 100 each, Rfl: 0    gabapentin 100 MG Oral Cap, Take 1 capsule (100 mg total) by mouth nightly., Disp: 90 capsule, Rfl: 3    carvedilol 12.5 MG Oral Tab, Take 1 tablet (12.5 mg total) by mouth 2 (two) times daily with meals., Disp: 60 tablet, Rfl: 0    midodrine 2.5 MG Oral Tab, Take 1 tablet (2.5 mg total) by mouth in the morning and 1 tablet (2.5 mg total) at noon and 1 tablet (2.5 mg total) in the evening. Hold if BP > 120/80.., Disp: 90 tablet, Rfl: 0    octreoTIDe 100 mcg/mL Injection Solution, Inject 1 mL (100 mcg total) into the skin every 8 (eight) hours., Disp: 90 mL, Rfl: 0    apixaban 5 MG Oral Tab, Take 2 tablets (10 mg total) by mouth 2 (two) times daily for 10 days, THEN 1 tablet (5 mg total) 2 (two) times daily., Disp: 110 tablet, Rfl: 0    tamsulosin 0.4 MG Oral Cap, Take 1 capsule (0.4 mg total) by mouth daily., Disp: 30 capsule, Rfl: 0    LORATADINE 10 MG Oral Tab, TAKE 1 TABLET BY MOUTH EVERY DAY, Disp: 90 tablet, Rfl: 1    glipiZIDE 5 MG Oral Tab, Take 1.5 tablets (7.5 mg total) by mouth daily with breakfast., Disp: , Rfl:     Accu-Chek FastClix Lancets Does not apply Misc, Check sugar once daily as directed (E11.42), Disp: 100 each, Rfl: 2    hydroxychloroquine 200 MG Oral Tab, Take 1 tablet (200 mg total) by mouth daily., Disp: 90  tablet, Rfl: 1    Glucose Blood (ACCU-CHEK GUIDE) In Vitro Strip, Check blood sugar twice a day (fasting and before dinner). DX: E11.42, NIDDM, Disp: 200 strip, Rfl: 1    rosuvastatin (CRESTOR) 20 MG Oral Tab, Take 1 tablet (20 mg total) by mouth nightly., Disp: 90 tablet, Rfl: 0    omeprazole 20 MG Oral Capsule Delayed Release, Take 1 capsule (20 mg total) by mouth every morning., Disp: 90 capsule, Rfl: 1    cyclobenzaprine 10 MG Oral Tab, Take 1 tablet (10 mg total) by mouth nightly as needed for Muscle spasms., Disp: 90 tablet, Rfl: 1    glycopyrrolate 1 MG Oral Tab, Take 1 tablet (1 mg total) by mouth 3 (three) times daily., Disp: 90 tablet, Rfl: 1    albuterol 108 (90 Base) MCG/ACT Inhalation Aero Soln, inhale 2 puff by inhalation route  every 4 - 6 hours as needed, Disp: 1 each, Rfl: 0    montelukast 10 MG Oral Tab, Take 1 tablet (10 mg total) by mouth nightly., Disp: 90 tablet, Rfl: 3    valsartan 80 MG Oral Tab, Take 1 tablet (80 mg total) by mouth daily., Disp: 90 tablet, Rfl: 3    albuterol 108 (90 Base) MCG/ACT Inhalation Aero Soln, inhale 2 puff by inhalation route  every 4 - 6 hours as needed, Disp: 1 each, Rfl: 5    Blood Glucose Monitoring Suppl (ONETOUCH VERIO) w/Device Does not apply Kit, 1 each daily. Test 1x daily, Disp: 1 kit, Rfl: 0    Glucose Blood (ONETOUCH VERIO) In Vitro Strip, Test 1x daily, Disp: 100 strip, Rfl: 1    OneTouch Delica Lancets 33G Does not apply Misc, 4 x daily, Disp: 100 each, Rfl: 1    Blood Glucose Monitoring Suppl (BLOOD GLUCOSE SYSTEM GILLES) Does not apply Kit, DX E 11.9.  Checks every morning., Disp: 1 kit, Rfl: 0    Lancets Does not apply Misc, DX E 11.9.  Checks every morning., Disp: 50 each, Rfl: 11    Blood Glucose Monitoring Suppl w/Device Does not apply Kit, DX E 11.9.  Checks every morning., Disp: 1 kit, Rfl: 0    Blood Gluc Meter Disp-Strips Does not apply Device, DX E 11.9.  Checks every morning., Disp: 100 each, Rfl: 11    acetaminophen 500 MG Oral Tab, Take 1  tablet (500 mg total) by mouth every 6 (six) hours as needed for Pain., Disp: , Rfl:     Allergies:  Allergies   Allergen Reactions    Celebrex [Celecoxib] PALPITATIONS    Penicillins ITCHING and SWELLING    Metformin And Related UNKNOWN    Olmesartan UNKNOWN        Review of Systems:Oncology specific ROS negative except as per HPI    BP (!) 179/78 (BP Location: Left arm, Patient Position: Sitting, Cuff Size: adult)   Pulse 93   Temp 97.9 °F (36.6 °C) (Oral)   Resp 16   Ht 1.549 m (5' 1\")   Wt 62.1 kg (137 lb)   SpO2 94%   BMI 25.89 kg/m²   Wt Readings from Last 6 Encounters:   03/25/24 62.1 kg (137 lb)   03/22/24 63.5 kg (140 lb)   02/29/24 58.7 kg (129 lb 6.4 oz)   02/23/24 62.1 kg (137 lb)   02/16/24 65 kg (143 lb 3.2 oz)   02/15/24 64.4 kg (142 lb)   General: Patient is alert and oriented x 3, not in acute distress.  HEENT: EOMI  Chest: Clear to auscultation bilaterally.  Abdomen: soft, non tender, non distended  Extremities: no signs of LE edema  Neurological: Strength is grossly intact. Moving all extremities. Gait appropriate.   Psych/Depression: Appropriate mood and affect          Lab Results   Component Value Date    WBC 12.4 (H) 03/25/2024    RBC 3.12 (L) 03/25/2024    HGB 9.4 (L) 03/25/2024    HCT 29.1 (L) 03/25/2024    MCV 93.3 03/25/2024    MCH 30.1 03/25/2024    MCHC 32.3 03/25/2024    RDW 17.1 (H) 03/25/2024    .0 03/25/2024     Lab Results   Component Value Date     (H) 03/25/2024    BUN 13 03/25/2024    BUNCREA 14.8 03/25/2024    CREATSERUM 0.88 03/25/2024    ANIONGAP 5 03/25/2024    GFRNAA 47 (L) 05/02/2022    GFRAA 54 (L) 05/02/2022    CA 8.7 03/25/2024    OSMOCALC 302 (H) 03/25/2024    ALKPHO 89 03/25/2024    AST 23 03/25/2024    ALT 28 03/25/2024    BILT 0.2 03/25/2024    TP 6.8 03/25/2024    ALB 3.5 03/25/2024    GLOBULIN 3.3 03/25/2024     03/25/2024    K 3.5 03/25/2024     03/25/2024    CO2 26.0 03/25/2024     Iron studies and CBC+diff  reviewed      Impression and Plan:  84 year old with chronic iron deficiency anemia in the setting of bleeding AVMs s/p repeat endoscopic treatment, Iv iron treatment and rbc tfx 5/2022    1.) BUZZ from AVM's  - pt's labs show that she needs some more IV iron soon as her serum iron and iron sat are low and her hgb is dropping  - elevated serum ferritin is an acute phase reactant; inflammation may be from recent GI bleeding/PE, etc    --planning more venofer x 3 doses in light of recent GI bleeding requiring cauterization during hospitalization and f/u in 8 weeks    - previously CAPUTO has been a hallmark for symptomatic anemia for her, also has worsening fatigue and some lightheadedness    -Continue PRN iron infusions to keep iron sat and ferritin levels in normal range.     -Will try to keep hgb above 9 if possible, hopefully can avoid octreotide although can be considered if she has repeat admissions or blood transfusions due to symptomatic acute blood loss anemia    2.) Hypomagnesia   - rec TID use of OTC magnesium oxide given by her PCP; discussed w/ her PCP. Pt is asymptomatic and Dr. Sorto is planning to check her levels again later this week    3.) Leukocytosis with Neutrophilia  --likely from inflammation in light of recent GI bleeding and PE; suspect her pna has resolved    4.) HTN  --remains elevated    5.) Acute PE  --found on 2/23/24; not on anticoagulation in light of recent GI bleeding   --IVC filter placed on 3/7/24 by IR; she was rec for IVC filter retrial in 3-6 mo by IR    MDM: High Risk; ongoing likely GI bleeding from AVM's, Hypomagnesia, HTN, acute PE, IVC filter placement    Eric Mohamud MD  [facility]  [address], [place] [address]

## 2024-03-25 NOTE — TELEPHONE ENCOUNTER
LOV: 8/10/23  Last Refilled:#90, 1rf 8/10/23    ASSESSMENT/PLAN:      Seropositive RA- stable   - Currently on hydroxychloroquine 200 mg daily and prednisone 5 mg daily  - At times she will have pain in her hands and wrists but not severe.  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  Getting IV iron infusions     Peripheral neuropathy- stable, resolved   - Was seen by Dr. 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  Wondering if she needs to go on treatment for the positive hepatitis B     Pt will f/u in 3 mos      Ashia Goodrich MD  8/10/2023  12:40 PM              Please advise.

## 2024-03-26 ENCOUNTER — TELEPHONE (OUTPATIENT)
Dept: PULMONOLOGY | Facility: CLINIC | Age: 84
End: 2024-03-26

## 2024-03-26 ENCOUNTER — TELEPHONE (OUTPATIENT)
Facility: CLINIC | Age: 84
End: 2024-03-26

## 2024-03-26 DIAGNOSIS — Q27.30 AVM (ARTERIOVENOUS MALFORMATION) (HCC): ICD-10-CM

## 2024-03-26 DIAGNOSIS — J98.4 PULMONARY LESION: Primary | ICD-10-CM

## 2024-03-26 DIAGNOSIS — D50.9 IRON DEFICIENCY ANEMIA, UNSPECIFIED IRON DEFICIENCY ANEMIA TYPE: Primary | ICD-10-CM

## 2024-03-26 NOTE — TELEPHONE ENCOUNTER
Pt daughter calling for CT Chest orders.  She states that MD wanted pt to repeat CT prior to 4/11/24 Hospital follow up appt.  Please call

## 2024-03-26 NOTE — TELEPHONE ENCOUNTER
Pt daughter calling to inform MD that CBC was drawn yesterday.  Lab used the order from PCP.  She is requesting that Dr Garcia review results.

## 2024-03-27 RX ORDER — FLUTICASONE PROPIONATE AND SALMETEROL 250; 50 UG/1; UG/1
1 POWDER RESPIRATORY (INHALATION) EVERY 12 HOURS
Qty: 60 EACH | Refills: 5 | OUTPATIENT
Start: 2024-03-27

## 2024-03-27 NOTE — TELEPHONE ENCOUNTER
LOV: 9/19/23  Suggested follow up: 1 year  NOV: 4/11/24  Last refill: 3/22/24 (Dr. Sorto) - 1 inhaler 5 refills

## 2024-03-27 NOTE — TELEPHONE ENCOUNTER
Dr. Garcia    Patient had CBC drawn through Dr. Sorto yesterday 3/25/24 and requesting that you view the results.    Thank you

## 2024-03-28 NOTE — TELEPHONE ENCOUNTER
Dr. Parker - Patient states you want CT Chest prior to appt on 4/11/24.  Per inpatient progress notes 3/10/24: \"Recommendations: Patient should get a follow-up CT scan of the chest at the 6 to 8-week interval. \"  CT Chest with contrast pended, if agreeable.

## 2024-03-28 NOTE — TELEPHONE ENCOUNTER
Spoke with patient's daughter, Brea, informed her CT Chest orders are in Epic, daughter verbalized understanding, transferred call to Central Scheduling.

## 2024-03-29 ENCOUNTER — NURSE ONLY (OUTPATIENT)
Dept: HEMATOLOGY/ONCOLOGY | Facility: HOSPITAL | Age: 84
End: 2024-03-29
Attending: INTERNAL MEDICINE
Payer: MEDICARE

## 2024-03-29 DIAGNOSIS — K90.9 MALABSORPTION OF IRON (HCC): ICD-10-CM

## 2024-03-29 DIAGNOSIS — D50.9 IRON DEFICIENCY ANEMIA, UNSPECIFIED IRON DEFICIENCY ANEMIA TYPE: ICD-10-CM

## 2024-03-29 DIAGNOSIS — E83.42 HYPOMAGNESEMIA: ICD-10-CM

## 2024-03-29 DIAGNOSIS — D50.0 IRON DEFICIENCY ANEMIA DUE TO CHRONIC BLOOD LOSS: ICD-10-CM

## 2024-03-29 DIAGNOSIS — Z45.2 ENCOUNTER FOR CARE RELATED TO VASCULAR ACCESS PORT: Primary | ICD-10-CM

## 2024-03-29 DIAGNOSIS — Q27.30 AVM (ARTERIOVENOUS MALFORMATION) (HCC): ICD-10-CM

## 2024-03-29 LAB
BASOPHILS # BLD AUTO: 0.04 X10(3) UL (ref 0–0.2)
BASOPHILS NFR BLD AUTO: 0.3 %
DEPRECATED RDW RBC AUTO: 60.4 FL (ref 35.1–46.3)
EOSINOPHIL # BLD AUTO: 0.07 X10(3) UL (ref 0–0.7)
EOSINOPHIL NFR BLD AUTO: 0.5 %
ERYTHROCYTE [DISTWIDTH] IN BLOOD BY AUTOMATED COUNT: 17.6 % (ref 11–15)
HCT VFR BLD AUTO: 30.8 %
HGB BLD-MCNC: 9.4 G/DL
IMM GRANULOCYTES # BLD AUTO: 0.11 X10(3) UL (ref 0–1)
IMM GRANULOCYTES NFR BLD: 0.8 %
LYMPHOCYTES # BLD AUTO: 0.79 X10(3) UL (ref 1–4)
LYMPHOCYTES NFR BLD AUTO: 6 %
MAGNESIUM SERPL-MCNC: 1.6 MG/DL (ref 1.6–2.6)
MCH RBC QN AUTO: 29.1 PG (ref 26–34)
MCHC RBC AUTO-ENTMCNC: 30.5 G/DL (ref 31–37)
MCV RBC AUTO: 95.4 FL
MONOCYTES # BLD AUTO: 0.53 X10(3) UL (ref 0.1–1)
MONOCYTES NFR BLD AUTO: 4.1 %
NEUTROPHILS # BLD AUTO: 11.52 X10 (3) UL (ref 1.5–7.7)
NEUTROPHILS # BLD AUTO: 11.52 X10(3) UL (ref 1.5–7.7)
NEUTROPHILS NFR BLD AUTO: 88.3 %
PLATELET # BLD AUTO: 285 10(3)UL (ref 150–450)
RBC # BLD AUTO: 3.23 X10(6)UL
WBC # BLD AUTO: 13.1 X10(3) UL (ref 4–11)

## 2024-03-29 PROCEDURE — 85025 COMPLETE CBC W/AUTO DIFF WBC: CPT

## 2024-03-29 PROCEDURE — 83735 ASSAY OF MAGNESIUM: CPT

## 2024-03-29 PROCEDURE — 36591 DRAW BLOOD OFF VENOUS DEVICE: CPT

## 2024-03-29 RX ORDER — HEPARIN SODIUM (PORCINE) LOCK FLUSH IV SOLN 100 UNIT/ML 100 UNIT/ML
5 SOLUTION INTRAVENOUS ONCE
OUTPATIENT
Start: 2024-04-08

## 2024-03-29 RX ORDER — SODIUM CHLORIDE 9 MG/ML
10 INJECTION, SOLUTION INTRAMUSCULAR; INTRAVENOUS; SUBCUTANEOUS ONCE
OUTPATIENT
Start: 2024-04-08

## 2024-03-29 RX ORDER — HEPARIN SODIUM (PORCINE) LOCK FLUSH IV SOLN 100 UNIT/ML 100 UNIT/ML
5 SOLUTION INTRAVENOUS ONCE
Status: COMPLETED | OUTPATIENT
Start: 2024-03-29 | End: 2024-03-29

## 2024-03-29 RX ADMIN — HEPARIN SODIUM (PORCINE) LOCK FLUSH IV SOLN 100 UNIT/ML 500 UNITS: 100 SOLUTION INTRAVENOUS at 15:21:00

## 2024-04-01 ENCOUNTER — TELEPHONE (OUTPATIENT)
Dept: HEMATOLOGY/ONCOLOGY | Facility: HOSPITAL | Age: 84
End: 2024-04-01

## 2024-04-03 NOTE — TELEPHONE ENCOUNTER
The pt has chronic anemia due to GI blood loss ( recently in the hospital )- she will need to be monitored.     Please see if a standing order can be set up for CBC for dx anemia q 2 weeks and ill sign off.

## 2024-04-04 NOTE — TELEPHONE ENCOUNTER
Spoke to patient's daughter Brea (on MANUEL), verified Name and . Relayed Dr. Sorto's message below. Verbalized understanding and had no further questions at this time.

## 2024-04-04 NOTE — TELEPHONE ENCOUNTER
Hemoglobin shows anemia but hemoglobin stable.  Please tell patient orders were placed for every 2 weeks to check her levels.

## 2024-04-05 ENCOUNTER — OFFICE VISIT (OUTPATIENT)
Dept: SURGERY | Facility: CLINIC | Age: 84
End: 2024-04-05

## 2024-04-05 ENCOUNTER — APPOINTMENT (OUTPATIENT)
Dept: HEMATOLOGY/ONCOLOGY | Facility: HOSPITAL | Age: 84
End: 2024-04-05
Attending: INTERNAL MEDICINE
Payer: MEDICARE

## 2024-04-05 VITALS — WEIGHT: 139 LBS | HEIGHT: 61 IN | BODY MASS INDEX: 26.24 KG/M2

## 2024-04-05 DIAGNOSIS — R33.9 URINARY RETENTION: Primary | ICD-10-CM

## 2024-04-05 PROCEDURE — 1159F MED LIST DOCD IN RCRD: CPT | Performed by: PHYSICIAN ASSISTANT

## 2024-04-05 PROCEDURE — 99212 OFFICE O/P EST SF 10 MIN: CPT | Performed by: PHYSICIAN ASSISTANT

## 2024-04-05 PROCEDURE — 3008F BODY MASS INDEX DOCD: CPT | Performed by: PHYSICIAN ASSISTANT

## 2024-04-05 PROCEDURE — 1160F RVW MEDS BY RX/DR IN RCRD: CPT | Performed by: PHYSICIAN ASSISTANT

## 2024-04-05 PROCEDURE — 1111F DSCHRG MED/CURRENT MED MERGE: CPT | Performed by: PHYSICIAN ASSISTANT

## 2024-04-07 NOTE — PROGRESS NOTES
Subjective:   Chester Bautista is a 84 year old female with COPD, HTN, who presents today for follow up urinary retention.      Patient admitted for extended period recently at [facility] for PE among other medical issues. She was discharged with a benson catheter and had benson removed 3/24/24. She has been voiding without difficulty at home.    PVR today <100mL.     Imaging negative hydronephrosis. She was treated for Klebsiella UTI during admissions. Post void residuals were 400-500mL. Normal renal function. Had been constipated also.      Notes that she has been a chronic bladder holding because she doesn't like to void in public places. Able to hold her bladder for 6+ hours without voiding.   No prior benson catheter/retention.         History/Other:    Chief Complaint Reviewed and Verified  Nursing Notes Reviewed and   Verified  Allergies Reviewed  Medications Reviewed         Current Outpatient Medications   Medication Sig Dispense Refill    predniSONE 5 MG Oral Tab Take 1 tablet (5 mg total) by mouth daily. 90 tablet 1    fluticasone-salmeterol (WIXELA INHUB) 250-50 MCG/ACT Inhalation Aerosol Powder, Breath Activated Inhale 1 puff into the lungs every 12 (twelve) hours. 1 each 5    Blood Gluc Meter Disp-Strips Does not apply Device Dx. E11.9. Checks qam. 100 each 0    gabapentin 100 MG Oral Cap Take 1 capsule (100 mg total) by mouth nightly. 90 capsule 3    carvedilol 12.5 MG Oral Tab Take 1 tablet (12.5 mg total) by mouth 2 (two) times daily with meals. 60 tablet 0    midodrine 2.5 MG Oral Tab Take 1 tablet (2.5 mg total) by mouth in the morning and 1 tablet (2.5 mg total) at noon and 1 tablet (2.5 mg total) in the evening. Hold if BP > 120/80.. 90 tablet 0    LORATADINE 10 MG Oral Tab TAKE 1 TABLET BY MOUTH EVERY DAY 90 tablet 1    glipiZIDE 5 MG Oral Tab Take 1.5 tablets (7.5 mg total) by mouth daily with breakfast.      Accu-Chek FastClix Lancets Does not apply Misc Check sugar once daily as  directed (E11.42) 100 each 2    hydroxychloroquine 200 MG Oral Tab Take 1 tablet (200 mg total) by mouth daily. 90 tablet 1    Glucose Blood (ACCU-CHEK GUIDE) In Vitro Strip Check blood sugar twice a day (fasting and before dinner). DX: E11.42, NIDDM 200 strip 1    rosuvastatin (CRESTOR) 20 MG Oral Tab Take 1 tablet (20 mg total) by mouth nightly. 90 tablet 0    omeprazole 20 MG Oral Capsule Delayed Release Take 1 capsule (20 mg total) by mouth every morning. 90 capsule 1    cyclobenzaprine 10 MG Oral Tab Take 1 tablet (10 mg total) by mouth nightly as needed for Muscle spasms. 90 tablet 1    glycopyrrolate 1 MG Oral Tab Take 1 tablet (1 mg total) by mouth 3 (three) times daily. 90 tablet 1    montelukast 10 MG Oral Tab Take 1 tablet (10 mg total) by mouth nightly. 90 tablet 3    albuterol 108 (90 Base) MCG/ACT Inhalation Aero Soln inhale 2 puff by inhalation route  every 4 - 6 hours as needed 1 each 5    Glucose Blood (ONETOUCH VERIO) In Vitro Strip Test 1x daily 100 strip 1    Blood Gluc Meter Disp-Strips Does not apply Device DX E 11.9.  Checks every morning. 100 each 11    acetaminophen 500 MG Oral Tab Take 1 tablet (500 mg total) by mouth every 6 (six) hours as needed for Pain.      Lancets Does not apply Misc Dx. E11.9. Checks qam. 50 each 6    Blood Glucose Monitoring Suppl (BLOOD GLUCOSE SYSTEM GILLES) Does not apply Kit Dx. E11.9. Checks qam. 1 kit 0    Blood Glucose Monitoring Suppl w/Device Does not apply Kit Dx. E11.9. Checks qam. 1 kit 0    apixaban 5 MG Oral Tab Take 2 tablets (10 mg total) by mouth 2 (two) times daily for 10 days, THEN 1 tablet (5 mg total) 2 (two) times daily. (Patient not taking: Reported on 4/5/2024) 110 tablet 0    albuterol 108 (90 Base) MCG/ACT Inhalation Aero Soln inhale 2 puff by inhalation route  every 4 - 6 hours as needed 1 each 0    valsartan 80 MG Oral Tab Take 1 tablet (80 mg total) by mouth daily. 90 tablet 3    Blood Glucose Monitoring Suppl (ONETOUCH VERIO) w/Device Does  not apply Kit 1 each daily. Test 1x daily 1 kit 0    OneTouch Delica Lancets 33G Does not apply Misc 4 x daily 100 each 1    Blood Glucose Monitoring Suppl (BLOOD GLUCOSE SYSTEM GILLES) Does not apply Kit DX E 11.9.  Checks every morning. 1 kit 0    Lancets Does not apply Misc DX E 11.9.  Checks every morning. 50 each 11    Blood Glucose Monitoring Suppl w/Device Does not apply Kit DX E 11.9.  Checks every morning. 1 kit 0       Review of Systems:  Pertinent items are noted in HPI.      Objective:   Ht 5' 1\" (1.549 m)   Wt 139 lb (63 kg)   BMI 26.26 kg/m²  Estimated body mass index is 26.26 kg/m² as calculated from the following:    Height as of this encounter: 5' 1\" (1.549 m).    Weight as of this encounter: 139 lb (63 kg).    No distress  Non labored respirations    Laboratory Data:  Lab Results   Component Value Date    WBC 13.1 (H) 03/29/2024    HGB 9.4 (L) 03/29/2024    .0 03/29/2024     Lab Results   Component Value Date     03/25/2024    K 3.5 03/25/2024     03/25/2024    CO2 26.0 03/25/2024    BUN 13 03/25/2024     (H) 03/25/2024    GFRAA 54 (L) 05/02/2022    AST 23 03/25/2024    ALT 28 03/25/2024    TP 6.8 03/25/2024    ALB 3.5 03/25/2024    CA 8.7 03/25/2024    MG 1.6 03/29/2024       Urinalysis Results (last three years):  Recent Labs     12/05/22  1838 12/05/22  1840 12/20/22  1449 12/20/22  1504 03/10/23  1548 03/10/23  1551 02/02/24  1437 02/24/24  1302 02/25/24  0759 02/29/24  2333 03/06/24  1903 03/22/24  1112 03/22/24  1114 03/22/24  1311   COLORUR  --  Yellow  --  Yellow  --   --  Yellow Yellow Yellow Light-Yellow Light-Yellow  --  Light-Yellow Light-Yellow   CLARITY  --  Cloudy*  --  Slightly Cloudy*  --   --  Turbid* Ex.Turbid* Ex.Turbid* Clear Clear  --  Turbid* Turbid*   SPECGRAVITY 1.020 1.015 1.015 1.010  --  1.020 1.012 1.011 1.012 1.009 1.015 1.010 1.008 1.008   PHURINE 5.5 5.0 6.5 6.5  --  5.5 6.0 6.0 6.5 5.0 5.5 7.0 6.5 6.5   PROUR  --  100*  --  Negative  --   --   300* 30* 50* 30* 50*  --  70* 70*   GLUUR  --  Negative  --  Negative  --   --  Normal Normal Normal Normal Normal  --  Normal Normal   KETUR  --  Negative  --  Negative  --   --  Negative Negative Negative Negative Negative  --  Negative Negative   BILUR  --  Negative  --  Negative  --   --  Negative Negative Negative Negative Negative  --  Negative Negative   BLOODURINE  --  Negative  --  Negative  --   --  3+* 1+* 1+* Negative 1+*  --  1+* 1+*   NITRITE Negative Negative Negative Negative  --  Negative Negative Negative Negative Negative Negative Negative Negative Negative   UROBILINOGEN  --  <2.0  --  0.2  --   --  Normal Normal Normal Normal Normal  --  Normal Normal   LEUUR  --  Large*  --  Small*  --   --  500* 500* 500* Negative 250*  --  500* 500*   UASA  --  Negative  --   --   --   --   --   --   --   --   --   --   --   --    WBCUR  --  >50*  --  6-10*  6-10* 21-50*  --  >50* >50* >50* 1-5 11-20*  --  21-50* 21-50*   RBCUR  --  3-5*  --  0-2  0-2 0-2  --  >10* >10* 3-5* 0-2 >10*  --  3-5* 6-10*   BACUR  --  1+*  --  3+*  3+* 3+*  --  3+* 1+* 3+* Rare* None Seen  --  2+* 2+*       Urine Culture Results (last three years):  Lab Results   Component Value Date    URINECUL No Growth at 18-24 hrs. 03/06/2024    URINECUL (A) 02/25/2024     >100,000 CFU/ML Klebsiella (Enterobacter) aerogenes    URINECUL No Growth at 18-24 hrs. 02/03/2024    URINECUL >100,000 CFU/ML Enterococcus faecalis (A) 03/30/2023    URINECUL >100,000 CFU/ML Escherichia coli (A) 03/10/2023    URINECUL No Growth at 18-24 hrs. 12/20/2022    URINECUL >100,000 CFU/ML Escherichia coli (A) 12/05/2022    URINECUL 10,000 - 50,000 CFU/ML Escherichia coli (A) 12/05/2022       Imaging  No results found.    Assessment & Plan:   Urinary retention, resolved  May be some chronic incomplete emptying but likely worse due to prior medical complaints  PVR today minimal  Reviewed voiding techniques  May follow up prn.      Marizol Fitch PA-C

## 2024-04-09 RX ORDER — CARVEDILOL 12.5 MG/1
12.5 TABLET ORAL 2 TIMES DAILY WITH MEALS
Qty: 180 TABLET | Refills: 3 | Status: SHIPPED | OUTPATIENT
Start: 2024-04-09

## 2024-04-10 ENCOUNTER — TELEPHONE (OUTPATIENT)
Dept: INTERNAL MEDICINE CLINIC | Facility: CLINIC | Age: 84
End: 2024-04-10

## 2024-04-10 ENCOUNTER — APPOINTMENT (OUTPATIENT)
Dept: HEMATOLOGY/ONCOLOGY | Facility: HOSPITAL | Age: 84
End: 2024-04-10
Attending: INTERNAL MEDICINE
Payer: MEDICARE

## 2024-04-10 ENCOUNTER — TELEPHONE (OUTPATIENT)
Dept: CASE MANAGEMENT | Age: 84
End: 2024-04-10

## 2024-04-10 DIAGNOSIS — R05.3 CHRONIC COUGH: Primary | ICD-10-CM

## 2024-04-10 DIAGNOSIS — I26.99 OTHER ACUTE PULMONARY EMBOLISM WITHOUT ACUTE COR PULMONALE (HCC): ICD-10-CM

## 2024-04-10 NOTE — TELEPHONE ENCOUNTER
Health plan needs to know why the patient needs another CT Chest so soon.     Please advise.     Thank you,

## 2024-04-10 NOTE — TELEPHONE ENCOUNTER
Please see Inpatient Progress Note from Dr. Parker dated 3/10/24:    1.  Acute venous thromboembolism-the patient continued to bleed on anticoagulation and subsequently the anticoagulation was stopped and IVC filter deployed.  Hemoglobin is relatively stable now at 7.8.     Recommendations:  1.  Conservative management  2.  Await capsule endoscopy  3.  Will follow clinically  4.  Discharge planning  5.  Perhaps at some juncture at the mid to long-term, could consider low-dose anticoagulation, but will try to stay away from full dose anticoagulation.     2.  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  There also was question of prior history of sarcoidosis.  There is leukocytosis at present.  She carries prior diagnosis pneumonia recently.     Recommendations: Patient should get a follow-up CT scan of the chest at the 6 to 8-week interval.

## 2024-04-11 ENCOUNTER — APPOINTMENT (OUTPATIENT)
Dept: HEMATOLOGY/ONCOLOGY | Facility: HOSPITAL | Age: 84
End: 2024-04-11
Attending: INTERNAL MEDICINE
Payer: MEDICARE

## 2024-04-11 ENCOUNTER — OFFICE VISIT (OUTPATIENT)
Dept: PULMONOLOGY | Facility: CLINIC | Age: 84
End: 2024-04-11
Payer: MEDICARE

## 2024-04-11 VITALS — HEART RATE: 84 BPM | SYSTOLIC BLOOD PRESSURE: 155 MMHG | DIASTOLIC BLOOD PRESSURE: 66 MMHG | OXYGEN SATURATION: 95 %

## 2024-04-11 VITALS
WEIGHT: 138 LBS | HEIGHT: 61 IN | HEART RATE: 101 BPM | OXYGEN SATURATION: 98 % | RESPIRATION RATE: 16 BRPM | SYSTOLIC BLOOD PRESSURE: 132 MMHG | BODY MASS INDEX: 26.06 KG/M2 | DIASTOLIC BLOOD PRESSURE: 78 MMHG

## 2024-04-11 DIAGNOSIS — E83.42 HYPOMAGNESEMIA: ICD-10-CM

## 2024-04-11 DIAGNOSIS — I82.90 VTE (VENOUS THROMBOEMBOLISM): Primary | ICD-10-CM

## 2024-04-11 DIAGNOSIS — D50.0 IRON DEFICIENCY ANEMIA DUE TO CHRONIC BLOOD LOSS: ICD-10-CM

## 2024-04-11 DIAGNOSIS — Z45.2 ENCOUNTER FOR CARE RELATED TO VASCULAR ACCESS PORT: Primary | ICD-10-CM

## 2024-04-11 DIAGNOSIS — Q27.30 AVM (ARTERIOVENOUS MALFORMATION) (HCC): ICD-10-CM

## 2024-04-11 DIAGNOSIS — D50.9 IRON DEFICIENCY ANEMIA, UNSPECIFIED IRON DEFICIENCY ANEMIA TYPE: ICD-10-CM

## 2024-04-11 DIAGNOSIS — K90.9 MALABSORPTION OF IRON (HCC): ICD-10-CM

## 2024-04-11 LAB
BASOPHILS # BLD AUTO: 0.05 X10(3) UL (ref 0–0.2)
BASOPHILS NFR BLD AUTO: 0.5 %
DEPRECATED RDW RBC AUTO: 55.2 FL (ref 35.1–46.3)
EOSINOPHIL # BLD AUTO: 0.16 X10(3) UL (ref 0–0.7)
EOSINOPHIL NFR BLD AUTO: 1.5 %
ERYTHROCYTE [DISTWIDTH] IN BLOOD BY AUTOMATED COUNT: 16.2 % (ref 11–15)
HCT VFR BLD AUTO: 29.3 %
HGB BLD-MCNC: 9.6 G/DL
IMM GRANULOCYTES # BLD AUTO: 0.09 X10(3) UL (ref 0–1)
IMM GRANULOCYTES NFR BLD: 0.8 %
LYMPHOCYTES # BLD AUTO: 0.93 X10(3) UL (ref 1–4)
LYMPHOCYTES NFR BLD AUTO: 8.7 %
MAGNESIUM SERPL-MCNC: 1.3 MG/DL (ref 1.6–2.6)
MCH RBC QN AUTO: 30.7 PG (ref 26–34)
MCHC RBC AUTO-ENTMCNC: 32.8 G/DL (ref 31–37)
MCV RBC AUTO: 93.6 FL
MONOCYTES # BLD AUTO: 0.6 X10(3) UL (ref 0.1–1)
MONOCYTES NFR BLD AUTO: 5.6 %
NEUTROPHILS # BLD AUTO: 8.85 X10 (3) UL (ref 1.5–7.7)
NEUTROPHILS # BLD AUTO: 8.85 X10(3) UL (ref 1.5–7.7)
NEUTROPHILS NFR BLD AUTO: 82.9 %
PLATELET # BLD AUTO: 316 10(3)UL (ref 150–450)
RBC # BLD AUTO: 3.13 X10(6)UL
WBC # BLD AUTO: 10.7 X10(3) UL (ref 4–11)

## 2024-04-11 PROCEDURE — 3075F SYST BP GE 130 - 139MM HG: CPT | Performed by: INTERNAL MEDICINE

## 2024-04-11 PROCEDURE — 3078F DIAST BP <80 MM HG: CPT | Performed by: INTERNAL MEDICINE

## 2024-04-11 PROCEDURE — 36591 DRAW BLOOD OFF VENOUS DEVICE: CPT

## 2024-04-11 PROCEDURE — 1111F DSCHRG MED/CURRENT MED MERGE: CPT | Performed by: INTERNAL MEDICINE

## 2024-04-11 PROCEDURE — 96374 THER/PROPH/DIAG INJ IV PUSH: CPT

## 2024-04-11 PROCEDURE — 83735 ASSAY OF MAGNESIUM: CPT

## 2024-04-11 PROCEDURE — 1126F AMNT PAIN NOTED NONE PRSNT: CPT | Performed by: INTERNAL MEDICINE

## 2024-04-11 PROCEDURE — 99213 OFFICE O/P EST LOW 20 MIN: CPT | Performed by: INTERNAL MEDICINE

## 2024-04-11 PROCEDURE — 96372 THER/PROPH/DIAG INJ SC/IM: CPT

## 2024-04-11 PROCEDURE — 3008F BODY MASS INDEX DOCD: CPT | Performed by: INTERNAL MEDICINE

## 2024-04-11 PROCEDURE — 85025 COMPLETE CBC W/AUTO DIFF WBC: CPT

## 2024-04-11 RX ORDER — SODIUM CHLORIDE 9 MG/ML
10 INJECTION, SOLUTION INTRAMUSCULAR; INTRAVENOUS; SUBCUTANEOUS ONCE
OUTPATIENT
Start: 2024-04-12

## 2024-04-11 RX ORDER — HEPARIN SODIUM (PORCINE) LOCK FLUSH IV SOLN 100 UNIT/ML 100 UNIT/ML
5 SOLUTION INTRAVENOUS ONCE
Status: CANCELLED | OUTPATIENT
Start: 2024-04-12

## 2024-04-11 RX ORDER — HEPARIN SODIUM (PORCINE) LOCK FLUSH IV SOLN 100 UNIT/ML 100 UNIT/ML
5 SOLUTION INTRAVENOUS ONCE
Status: DISCONTINUED | OUTPATIENT
Start: 2024-04-11 | End: 2024-04-11

## 2024-04-11 NOTE — TELEPHONE ENCOUNTER
My apologies,     I do not see an inpatient encounter dated 03/10/2024 from Dr Parker.      Please advise.     Thank you

## 2024-04-11 NOTE — PROGRESS NOTES
Pt here for Octreotide injection/ Labs/ and Venofer.. Pt reports fatigue     Ordering MD: Cade    Patient reports she is having a CT scan tomorrow and prefers to use her Port.  I accessed Port with Power Port Mann needle today.  Will leave accessed for tomorrow CT scan.  Will return tomorrow after scan to have Mann needle removed after CT scan completed.     Pt tolerated infusion without difficulty or complaint. Reviewed next apt date/time: 4/12.. Port deaccess after CT scan      Education Record  Learner:  Patient  Disease / Diagnosis: Iron deficiency Anemia  Barriers / Limitations:  None  Method:  Discussion  General Topics:  Plan of care reviewed  Outcome:  Shows understanding

## 2024-04-11 NOTE — PROGRESS NOTES
The patient is an 84-year-old female who I know well from prior evaluation comes in now for follow-up.  She had acute venous thromboembolism with concomitant GI bleeding and was intolerant of anticoagulation as she kept bleeding.  She had multiple scopes and there was no specific identification of pathology.  She also underwent capsule endoscopy.  She is stabilizing clinically now.  CT scan of the chest also demonstrated scarring, bronchiectasis and apical pneumatoceles mostly anteriorly.  She had acute kidney insufficiency as well.  She notes that her breathing is pretty good although she has dyspnea with stair climbing.    Review of Systems:  Vision normal. Ear nose and throat normal. Bowel normal. Bladder function normal. No depression. No thyroid disease. No lymphatic system concerns.  No rash. Muscles and joints unremarkable. No weight loss no weight gain.    Physical Examination:  Vital signs normal. HEENT examination is unremarkable with pupils equal round and reactive to light and accommodation. Neck without adenopathy, thyromegaly, JVD nor bruit. Lungs clear to auscultation and percussion. Cardiac regular rate and rhythm no murmur. Abdomen nontender, without hepatosplenomegaly and no mass appreciable. Extremities and Musculoskeletal without clubbing cyanosis nor edema, and mobility acceptable. Neurologic grossly intact with symmetric tone and strength and reflex.    Assessment and plan:  1.  Venous thromboembolism-status post IVC filter.  Will manage conservatively without anticoagulation as she failed repeatedly.    Recommendations: Continue current management, patient self with exertion, see me in the office in 4 to 6 months and sooner if needed and contact me promptly if new trouble.    2.  Gastrointestinal bleeding-as per gastroenterology    3.  Abnormal CT scan of the chest-scarring with bronchiectasis and pneumatoceles.  Annual flu shot, COVID booster, RSV vaccine.  The patient has a follow-up CT scan  of the chest scheduled for tomorrow.

## 2024-04-12 ENCOUNTER — NURSE ONLY (OUTPATIENT)
Dept: HEMATOLOGY/ONCOLOGY | Facility: HOSPITAL | Age: 84
End: 2024-04-12
Attending: INTERNAL MEDICINE
Payer: MEDICARE

## 2024-04-12 DIAGNOSIS — K90.9 MALABSORPTION OF IRON (HCC): ICD-10-CM

## 2024-04-12 DIAGNOSIS — Z45.2 ENCOUNTER FOR CARE RELATED TO VASCULAR ACCESS PORT: Primary | ICD-10-CM

## 2024-04-12 DIAGNOSIS — D50.0 IRON DEFICIENCY ANEMIA DUE TO CHRONIC BLOOD LOSS: ICD-10-CM

## 2024-04-12 DIAGNOSIS — Q27.30 AVM (ARTERIOVENOUS MALFORMATION) (HCC): ICD-10-CM

## 2024-04-12 PROCEDURE — 96523 IRRIG DRUG DELIVERY DEVICE: CPT

## 2024-04-12 RX ORDER — HEPARIN SODIUM (PORCINE) LOCK FLUSH IV SOLN 100 UNIT/ML 100 UNIT/ML
5 SOLUTION INTRAVENOUS ONCE
Status: COMPLETED | OUTPATIENT
Start: 2024-04-12 | End: 2024-04-12

## 2024-04-12 RX ORDER — HEPARIN SODIUM (PORCINE) LOCK FLUSH IV SOLN 100 UNIT/ML 100 UNIT/ML
5 SOLUTION INTRAVENOUS ONCE
OUTPATIENT
Start: 2024-05-10

## 2024-04-12 RX ORDER — SODIUM CHLORIDE 9 MG/ML
10 INJECTION, SOLUTION INTRAMUSCULAR; INTRAVENOUS; SUBCUTANEOUS ONCE
OUTPATIENT
Start: 2024-05-10

## 2024-04-12 RX ADMIN — HEPARIN SODIUM (PORCINE) LOCK FLUSH IV SOLN 100 UNIT/ML 500 UNITS: 100 SOLUTION INTRAVENOUS at 15:42:00

## 2024-04-12 NOTE — PROGRESS NOTES
CBC Normal (white blood cells and platelets), anemia is present and stable like prior.   Magnesium level is low again.  Would recommend bumping up the magnesium to 1 more tablet 400 mg a day over-the-counter, depends on how much she is taking currently.  Recheck in 1 week.  Orders written.

## 2024-04-13 NOTE — TELEPHONE ENCOUNTER
Review pended refill request as it does not fall under a protocol.    Last Rx: 11/17/23  LOV: 3/22/24      Refill passed per [facility]- but tamsulosin no  longer on active med list last refill was 3/5/24    Refill Protocol Appointment Criteria  Appointment scheduled in the past 12 months or in the next 3 months  Recent Outpatient Visits              Yesterday Encounter for care related to vascular access port    [facility] - Infusion    Nurse Only    2 days ago Encounter for care related to vascular access port    [facility] - Infusion    Office Visit    2 days ago VTE (venous thromboembolism)    [facility] Chapin Parker MD    Office Visit    1 week ago Urinary retention    [facility] Marizol Covington PA-C    Office Visit    2 weeks ago Encounter for care related to vascular access port    [facility] - Infusion    Nurse Only          Future Appointments         Provider Department Appt Notes    In 3 days EM CC LAB2 [facility] - Infusion VENOFER [id number] 2/3    In 6 days EM CC INFRN 4 [facility] - Infusion VENOFER [id number] 3/3    In 1 week EM CC LAB2 [facility] - Infusion MAG-PORT-MYJ* order in order inquiry*    In 1 week [facility] CT RM1 [facility] CT pt has port    In 3 weeks EM CC LAB2 [facility] - Infusion OCTREOTIDE. IM. CL    In 3 weeks EM CC LAB2 [facility] - Infusion MAG-PORT-MYJ* order in order inquiry*    In 1 month Vic Thakkar APRN [facility] 3 months    In 1 month EM CC LAB2 [facility] - Infusion MAG-PORT-MYJ* order in order inquiry*    In 1 month EM CC LAB2 [facility] - Infusion [id number]    In 1 month Eric Mohamud MD  [facility] Hematology Oncology 8 WEEK FOLLOW UP    In 3 months Quinton Tesfaye MD [facility] 1 YEAR F/U    In 5 months Chapin Parker MD [facility] yearly    In 6 months Chapin Parker MD [facility] 4 - 6 mos

## 2024-04-14 RX ORDER — CYCLOBENZAPRINE HCL 10 MG
10 TABLET ORAL NIGHTLY PRN
Qty: 90 TABLET | Refills: 1 | Status: SHIPPED | OUTPATIENT
Start: 2024-04-14

## 2024-04-14 RX ORDER — TAMSULOSIN HYDROCHLORIDE 0.4 MG/1
0.4 CAPSULE ORAL DAILY
Qty: 90 CAPSULE | Refills: 1 | Status: SHIPPED | OUTPATIENT
Start: 2024-04-14

## 2024-04-16 ENCOUNTER — OFFICE VISIT (OUTPATIENT)
Dept: HEMATOLOGY/ONCOLOGY | Facility: HOSPITAL | Age: 84
End: 2024-04-16
Attending: INTERNAL MEDICINE
Payer: MEDICARE

## 2024-04-16 VITALS
HEART RATE: 82 BPM | RESPIRATION RATE: 16 BRPM | TEMPERATURE: 97 F | DIASTOLIC BLOOD PRESSURE: 65 MMHG | OXYGEN SATURATION: 97 % | SYSTOLIC BLOOD PRESSURE: 151 MMHG

## 2024-04-16 DIAGNOSIS — K90.9 MALABSORPTION OF IRON (HCC): ICD-10-CM

## 2024-04-16 DIAGNOSIS — Z45.2 ENCOUNTER FOR CARE RELATED TO VASCULAR ACCESS PORT: Primary | ICD-10-CM

## 2024-04-16 DIAGNOSIS — D50.0 IRON DEFICIENCY ANEMIA DUE TO CHRONIC BLOOD LOSS: ICD-10-CM

## 2024-04-16 DIAGNOSIS — Q27.30 AVM (ARTERIOVENOUS MALFORMATION) (HCC): ICD-10-CM

## 2024-04-16 PROCEDURE — 96374 THER/PROPH/DIAG INJ IV PUSH: CPT

## 2024-04-16 RX ORDER — HEPARIN SODIUM (PORCINE) LOCK FLUSH IV SOLN 100 UNIT/ML 100 UNIT/ML
5 SOLUTION INTRAVENOUS ONCE
Status: CANCELLED | OUTPATIENT
Start: 2024-05-10

## 2024-04-16 RX ORDER — HEPARIN SODIUM (PORCINE) LOCK FLUSH IV SOLN 100 UNIT/ML 100 UNIT/ML
5 SOLUTION INTRAVENOUS ONCE
Status: COMPLETED | OUTPATIENT
Start: 2024-04-16 | End: 2024-04-16

## 2024-04-16 RX ORDER — SODIUM CHLORIDE 9 MG/ML
10 INJECTION, SOLUTION INTRAMUSCULAR; INTRAVENOUS; SUBCUTANEOUS ONCE
OUTPATIENT
Start: 2024-05-10

## 2024-04-16 RX ADMIN — HEPARIN SODIUM (PORCINE) LOCK FLUSH IV SOLN 100 UNIT/ML 500 UNITS: 100 SOLUTION INTRAVENOUS at 14:19:00

## 2024-04-16 NOTE — PROGRESS NOTES
Pt here for 2 of 3 Venofer. Pt denies any issues or concerns.      Ordering MD: Dr Mohamud       Pt tolerated infusion without difficulty or complaint. Reviewed next apt date/time: 4/19      Education Record  Learner:  Patient  Disease / Diagnosis: kathy  Barriers / Limitations:  None  Method:  Discussion  General Topics:  Plan of care reviewed  Outcome:  Shows understanding

## 2024-04-19 ENCOUNTER — OFFICE VISIT (OUTPATIENT)
Dept: HEMATOLOGY/ONCOLOGY | Facility: HOSPITAL | Age: 84
End: 2024-04-19
Attending: INTERNAL MEDICINE
Payer: MEDICARE

## 2024-04-19 VITALS
RESPIRATION RATE: 16 BRPM | TEMPERATURE: 98 F | OXYGEN SATURATION: 94 % | HEART RATE: 88 BPM | DIASTOLIC BLOOD PRESSURE: 71 MMHG | SYSTOLIC BLOOD PRESSURE: 145 MMHG

## 2024-04-19 DIAGNOSIS — D50.0 IRON DEFICIENCY ANEMIA DUE TO CHRONIC BLOOD LOSS: ICD-10-CM

## 2024-04-19 DIAGNOSIS — Q27.30 AVM (ARTERIOVENOUS MALFORMATION) (HCC): ICD-10-CM

## 2024-04-19 DIAGNOSIS — K90.9 MALABSORPTION OF IRON (HCC): ICD-10-CM

## 2024-04-19 DIAGNOSIS — Z45.2 ENCOUNTER FOR CARE RELATED TO VASCULAR ACCESS PORT: Primary | ICD-10-CM

## 2024-04-19 PROCEDURE — 96374 THER/PROPH/DIAG INJ IV PUSH: CPT

## 2024-04-19 RX ORDER — HEPARIN SODIUM (PORCINE) LOCK FLUSH IV SOLN 100 UNIT/ML 100 UNIT/ML
SOLUTION INTRAVENOUS
Status: COMPLETED
Start: 2024-04-19 | End: 2024-04-19

## 2024-04-19 RX ORDER — HEPARIN SODIUM (PORCINE) LOCK FLUSH IV SOLN 100 UNIT/ML 100 UNIT/ML
5 SOLUTION INTRAVENOUS ONCE
Status: COMPLETED | OUTPATIENT
Start: 2024-04-19 | End: 2024-04-19

## 2024-04-19 RX ORDER — HEPARIN SODIUM (PORCINE) LOCK FLUSH IV SOLN 100 UNIT/ML 100 UNIT/ML
5 SOLUTION INTRAVENOUS ONCE
OUTPATIENT
Start: 2024-05-10

## 2024-04-19 RX ORDER — GLIPIZIDE 5 MG/1
5 TABLET ORAL
Qty: 180 TABLET | Refills: 1 | Status: SHIPPED | OUTPATIENT
Start: 2024-04-19

## 2024-04-19 RX ORDER — SODIUM CHLORIDE 9 MG/ML
10 INJECTION, SOLUTION INTRAMUSCULAR; INTRAVENOUS; SUBCUTANEOUS ONCE
OUTPATIENT
Start: 2024-05-10

## 2024-04-19 RX ADMIN — HEPARIN SODIUM (PORCINE) LOCK FLUSH IV SOLN 100 UNIT/ML 500 UNITS: 100 SOLUTION INTRAVENOUS at 14:15:00

## 2024-04-19 NOTE — PROGRESS NOTES
Pt here for 3 of 3 Venofer. Pt denies any issues or concerns.      Ordering MD: Dr Mohamud       Pt tolerated infusion without difficulty or complaint.    Reviewed next apt date/time: 4/26. AVS provided.       Education Record  Learner:  Patient  Disease / Diagnosis: kathy  Barriers / Limitations:  None  Method:  Discussion  General Topics:  Plan of care reviewed  Outcome:  Shows understanding

## 2024-04-19 NOTE — TELEPHONE ENCOUNTER
Endocrine Refill protocol for oral and injectable diabetic medications    Protocol Criteria:    -Appointment with Endocrinology completed in the last 6 months or scheduled in the next 3 months    -A1c result <8.5% in the past 6 months      Verify the above has been completed or scheduled in the appropriate timeline. If so can send a 90 day supply with 1 refill.     Last completed office visit:02/15/24  Next scheduled Follow up: 05/21/24  Last A1c result: 9.5%

## 2024-04-22 ENCOUNTER — PATIENT MESSAGE (OUTPATIENT)
Dept: INTERNAL MEDICINE CLINIC | Facility: CLINIC | Age: 84
End: 2024-04-22

## 2024-04-23 NOTE — TELEPHONE ENCOUNTER
Dr. Sorto, please see [employer] message and advise. Sending high priority d/t timings of labs. Thanks.

## 2024-04-23 NOTE — TELEPHONE ENCOUNTER
From: Chester Bautista  To: Heike Sorto  Sent: 4/22/2024 5:10 PM CDT  Subject: Magnesium Test- follow up    Hi Dr. Sorto  Per our last test result communication, mom was to recheck Magnesium level in 7days. She had a iron infusion on Friday the 19th. I told her to make sure they ramón her blood for the test. ( I had to leave her alone that day) Well someone told her she was not due labs until the 26th. Since they did not do the magnesium test on the 19th is it ok if she waits until the 26th? and if so should she stop taking the Magnesium pills until she test again?  Sincerely,   Chester Bautista  2/21/1940

## 2024-04-26 ENCOUNTER — NURSE ONLY (OUTPATIENT)
Dept: HEMATOLOGY/ONCOLOGY | Facility: HOSPITAL | Age: 84
End: 2024-04-26
Attending: INTERNAL MEDICINE
Payer: MEDICARE

## 2024-04-26 ENCOUNTER — HOSPITAL ENCOUNTER (OUTPATIENT)
Dept: CT IMAGING | Facility: HOSPITAL | Age: 84
Discharge: HOME | End: 2024-04-26
Attending: INTERNAL MEDICINE
Payer: MEDICARE

## 2024-04-26 DIAGNOSIS — Q27.30 AVM (ARTERIOVENOUS MALFORMATION) (HCC): ICD-10-CM

## 2024-04-26 DIAGNOSIS — D50.9 IRON DEFICIENCY ANEMIA, UNSPECIFIED IRON DEFICIENCY ANEMIA TYPE: ICD-10-CM

## 2024-04-26 DIAGNOSIS — Z45.2 ENCOUNTER FOR CARE RELATED TO VASCULAR ACCESS PORT: Primary | ICD-10-CM

## 2024-04-26 DIAGNOSIS — E83.42 HYPOMAGNESEMIA: ICD-10-CM

## 2024-04-26 DIAGNOSIS — K90.9 MALABSORPTION OF IRON (HCC): ICD-10-CM

## 2024-04-26 DIAGNOSIS — D50.0 IRON DEFICIENCY ANEMIA DUE TO CHRONIC BLOOD LOSS: ICD-10-CM

## 2024-04-26 DIAGNOSIS — J98.4 PULMONARY LESION: ICD-10-CM

## 2024-04-26 LAB
BASOPHILS # BLD AUTO: 0.06 X10(3) UL (ref 0–0.2)
BASOPHILS NFR BLD AUTO: 0.6 %
CREAT BLD-MCNC: 0.7 MG/DL
DEPRECATED RDW RBC AUTO: 57.8 FL (ref 35.1–46.3)
EGFRCR SERPLBLD CKD-EPI 2021: 85 ML/MIN/1.73M2 (ref 60–?)
EOSINOPHIL # BLD AUTO: 0.23 X10(3) UL (ref 0–0.7)
EOSINOPHIL NFR BLD AUTO: 2.1 %
ERYTHROCYTE [DISTWIDTH] IN BLOOD BY AUTOMATED COUNT: 16.5 % (ref 11–15)
HCT VFR BLD AUTO: 31.3 %
HGB BLD-MCNC: 10.3 G/DL
IMM GRANULOCYTES # BLD AUTO: 0.1 X10(3) UL (ref 0–1)
IMM GRANULOCYTES NFR BLD: 0.9 %
LYMPHOCYTES # BLD AUTO: 1.46 X10(3) UL (ref 1–4)
LYMPHOCYTES NFR BLD AUTO: 13.6 %
MAGNESIUM SERPL-MCNC: 1.2 MG/DL (ref 1.6–2.6)
MCH RBC QN AUTO: 31.3 PG (ref 26–34)
MCHC RBC AUTO-ENTMCNC: 32.9 G/DL (ref 31–37)
MCV RBC AUTO: 95.1 FL
MONOCYTES # BLD AUTO: 0.82 X10(3) UL (ref 0.1–1)
MONOCYTES NFR BLD AUTO: 7.7 %
NEUTROPHILS # BLD AUTO: 8.03 X10 (3) UL (ref 1.5–7.7)
NEUTROPHILS # BLD AUTO: 8.03 X10(3) UL (ref 1.5–7.7)
NEUTROPHILS NFR BLD AUTO: 75.1 %
PLATELET # BLD AUTO: 287 10(3)UL (ref 150–450)
RBC # BLD AUTO: 3.29 X10(6)UL
WBC # BLD AUTO: 10.7 X10(3) UL (ref 4–11)

## 2024-04-26 PROCEDURE — 71260 CT THORAX DX C+: CPT | Performed by: INTERNAL MEDICINE

## 2024-04-26 PROCEDURE — 36591 DRAW BLOOD OFF VENOUS DEVICE: CPT

## 2024-04-26 PROCEDURE — 82565 ASSAY OF CREATININE: CPT

## 2024-04-26 PROCEDURE — 83735 ASSAY OF MAGNESIUM: CPT

## 2024-04-26 PROCEDURE — 85025 COMPLETE CBC W/AUTO DIFF WBC: CPT

## 2024-04-26 RX ORDER — HEPARIN SODIUM (PORCINE) LOCK FLUSH IV SOLN 100 UNIT/ML 100 UNIT/ML
5 SOLUTION INTRAVENOUS ONCE
OUTPATIENT
Start: 2024-05-10

## 2024-04-26 RX ORDER — SODIUM CHLORIDE 9 MG/ML
10 INJECTION, SOLUTION INTRAMUSCULAR; INTRAVENOUS; SUBCUTANEOUS ONCE
OUTPATIENT
Start: 2024-05-10

## 2024-04-26 RX ORDER — HEPARIN SODIUM (PORCINE) LOCK FLUSH IV SOLN 100 UNIT/ML 100 UNIT/ML
5 SOLUTION INTRAVENOUS ONCE
Status: DISCONTINUED | OUTPATIENT
Start: 2024-04-26 | End: 2024-04-26

## 2024-04-26 RX ORDER — HEPARIN SODIUM (PORCINE) LOCK FLUSH IV SOLN 100 UNIT/ML 100 UNIT/ML
500 SOLUTION INTRAVENOUS ONCE
Status: COMPLETED | OUTPATIENT
Start: 2024-04-26 | End: 2024-04-26

## 2024-04-26 RX ADMIN — HEPARIN SODIUM (PORCINE) LOCK FLUSH IV SOLN 100 UNIT/ML 500 UNITS: 100 SOLUTION INTRAVENOUS at 11:40:00

## 2024-04-28 NOTE — PROGRESS NOTES
CBC Normal (white blood cells and platelets), anemia is present but better than prior.  Magnesium level is still low.  Taking magnesium ?  If not taking would recommend 400 twice a day if taking would increase 400 more in a day over-the-counter.  Recheck levels in 1 week.  Follow-up with kidney doctor regarding the magnesium levels.

## 2024-04-30 ENCOUNTER — HOME HEALTH CHARGES (OUTPATIENT)
Dept: INTERNAL MEDICINE CLINIC | Facility: CLINIC | Age: 84
End: 2024-04-30

## 2024-04-30 DIAGNOSIS — D62 ACUTE ON CHRONIC BLOOD LOSS ANEMIA: Primary | ICD-10-CM

## 2024-04-30 DIAGNOSIS — E11.9 TYPE 2 DIABETES MELLITUS WITHOUT RETINOPATHY (HCC): ICD-10-CM

## 2024-04-30 DIAGNOSIS — Q27.30 AVM (ARTERIOVENOUS MALFORMATION) (HCC): ICD-10-CM

## 2024-05-03 ENCOUNTER — APPOINTMENT (OUTPATIENT)
Dept: HEMATOLOGY/ONCOLOGY | Facility: HOSPITAL | Age: 84
End: 2024-05-03
Attending: INTERNAL MEDICINE
Payer: MEDICARE

## 2024-05-08 ENCOUNTER — APPOINTMENT (OUTPATIENT)
Dept: HEMATOLOGY/ONCOLOGY | Facility: HOSPITAL | Age: 84
End: 2024-05-08
Attending: INTERNAL MEDICINE
Payer: MEDICARE

## 2024-05-08 ENCOUNTER — TELEPHONE (OUTPATIENT)
Dept: INTERNAL MEDICINE CLINIC | Facility: CLINIC | Age: 84
End: 2024-05-08

## 2024-05-08 DIAGNOSIS — D50.0 IRON DEFICIENCY ANEMIA DUE TO CHRONIC BLOOD LOSS: ICD-10-CM

## 2024-05-08 DIAGNOSIS — K90.9 MALABSORPTION OF IRON (HCC): ICD-10-CM

## 2024-05-08 DIAGNOSIS — Z45.2 ENCOUNTER FOR CARE RELATED TO VASCULAR ACCESS PORT: Primary | ICD-10-CM

## 2024-05-08 DIAGNOSIS — R79.0 LOW MAGNESIUM LEVEL: Primary | ICD-10-CM

## 2024-05-08 DIAGNOSIS — Q27.30 AVM (ARTERIOVENOUS MALFORMATION) (HCC): ICD-10-CM

## 2024-05-08 PROCEDURE — 96372 THER/PROPH/DIAG INJ SC/IM: CPT

## 2024-05-08 RX ORDER — HEPARIN SODIUM (PORCINE) LOCK FLUSH IV SOLN 100 UNIT/ML 100 UNIT/ML
5 SOLUTION INTRAVENOUS ONCE
Status: CANCELLED | OUTPATIENT
Start: 2024-05-10

## 2024-05-08 RX ORDER — SODIUM CHLORIDE 9 MG/ML
10 INJECTION, SOLUTION INTRAMUSCULAR; INTRAVENOUS; SUBCUTANEOUS ONCE
OUTPATIENT
Start: 2024-05-10

## 2024-05-08 NOTE — TELEPHONE ENCOUNTER
Patient's daughter is requesting referral.     Name of specialist and specialty department :  Dr. Hannah Encinas, Nephrology    Reason for visit with the specialist: magnesium level is low, PCP advised to schedule with this provider    Address of the specialist office:  [address] [place] [address]    Appointment date:   5/13/2024       CSS informed patient the turnaround time for referral is 5-7 business days.  Patient was informed to [facility] for referral status.

## 2024-05-08 NOTE — TELEPHONE ENCOUNTER
Dr. Sorto,     Daughter called requesting referral to Dr. Encinas     Pended referral please review diagnosis and sign off if you agree.    Thank you.  Vandana Nicole  [facility]

## 2024-05-10 ENCOUNTER — NURSE ONLY (OUTPATIENT)
Dept: HEMATOLOGY/ONCOLOGY | Facility: HOSPITAL | Age: 84
End: 2024-05-10
Attending: INTERNAL MEDICINE
Payer: MEDICARE

## 2024-05-10 DIAGNOSIS — D50.9 IRON DEFICIENCY ANEMIA, UNSPECIFIED IRON DEFICIENCY ANEMIA TYPE: ICD-10-CM

## 2024-05-10 DIAGNOSIS — E83.42 HYPOMAGNESEMIA: ICD-10-CM

## 2024-05-10 DIAGNOSIS — D50.0 IRON DEFICIENCY ANEMIA DUE TO CHRONIC BLOOD LOSS: ICD-10-CM

## 2024-05-10 DIAGNOSIS — Z45.2 ENCOUNTER FOR CARE RELATED TO VASCULAR ACCESS PORT: Primary | ICD-10-CM

## 2024-05-10 DIAGNOSIS — Q27.30 AVM (ARTERIOVENOUS MALFORMATION) (HCC): ICD-10-CM

## 2024-05-10 DIAGNOSIS — K90.9 MALABSORPTION OF IRON (HCC): ICD-10-CM

## 2024-05-10 LAB
BASOPHILS # BLD AUTO: 0.03 X10(3) UL (ref 0–0.2)
BASOPHILS NFR BLD AUTO: 0.2 %
DEPRECATED RDW RBC AUTO: 53.6 FL (ref 35.1–46.3)
EOSINOPHIL # BLD AUTO: 0.09 X10(3) UL (ref 0–0.7)
EOSINOPHIL NFR BLD AUTO: 0.6 %
ERYTHROCYTE [DISTWIDTH] IN BLOOD BY AUTOMATED COUNT: 15.5 % (ref 11–15)
HCT VFR BLD AUTO: 29 %
HGB BLD-MCNC: 9.2 G/DL
IMM GRANULOCYTES # BLD AUTO: 0.14 X10(3) UL (ref 0–1)
IMM GRANULOCYTES NFR BLD: 1 %
LYMPHOCYTES # BLD AUTO: 0.74 X10(3) UL (ref 1–4)
LYMPHOCYTES NFR BLD AUTO: 5.3 %
MAGNESIUM SERPL-MCNC: 1.8 MG/DL (ref 1.6–2.6)
MCH RBC QN AUTO: 30.1 PG (ref 26–34)
MCHC RBC AUTO-ENTMCNC: 31.7 G/DL (ref 31–37)
MCV RBC AUTO: 94.8 FL
MONOCYTES # BLD AUTO: 0.57 X10(3) UL (ref 0.1–1)
MONOCYTES NFR BLD AUTO: 4.1 %
NEUTROPHILS # BLD AUTO: 12.32 X10 (3) UL (ref 1.5–7.7)
NEUTROPHILS # BLD AUTO: 12.32 X10(3) UL (ref 1.5–7.7)
NEUTROPHILS NFR BLD AUTO: 88.8 %
PLATELET # BLD AUTO: 296 10(3)UL (ref 150–450)
RBC # BLD AUTO: 3.06 X10(6)UL
WBC # BLD AUTO: 13.9 X10(3) UL (ref 4–11)

## 2024-05-10 PROCEDURE — 85025 COMPLETE CBC W/AUTO DIFF WBC: CPT

## 2024-05-10 PROCEDURE — 36591 DRAW BLOOD OFF VENOUS DEVICE: CPT

## 2024-05-10 PROCEDURE — 83735 ASSAY OF MAGNESIUM: CPT

## 2024-05-10 RX ORDER — SODIUM CHLORIDE 9 MG/ML
10 INJECTION, SOLUTION INTRAMUSCULAR; INTRAVENOUS; SUBCUTANEOUS ONCE
OUTPATIENT
Start: 2024-06-07

## 2024-05-10 RX ORDER — HEPARIN SODIUM (PORCINE) LOCK FLUSH IV SOLN 100 UNIT/ML 100 UNIT/ML
5 SOLUTION INTRAVENOUS ONCE
OUTPATIENT
Start: 2024-06-07

## 2024-05-10 RX ORDER — HEPARIN SODIUM (PORCINE) LOCK FLUSH IV SOLN 100 UNIT/ML 100 UNIT/ML
5 SOLUTION INTRAVENOUS ONCE
Status: COMPLETED | OUTPATIENT
Start: 2024-05-10 | End: 2024-05-10

## 2024-05-10 RX ADMIN — HEPARIN SODIUM (PORCINE) LOCK FLUSH IV SOLN 100 UNIT/ML 500 UNITS: 100 SOLUTION INTRAVENOUS at 11:47:00

## 2024-05-12 NOTE — PROGRESS NOTES
CBC abNormal (white blood cells and red blood cells and platelets), white blood cell count is slightly elevated like prior anemia similar to prior.  Follow-up with hematology.  Magnesium level is normal.

## 2024-05-13 ENCOUNTER — OFFICE VISIT (OUTPATIENT)
Facility: CLINIC | Age: 84
End: 2024-05-13
Payer: MEDICARE

## 2024-05-13 VITALS
WEIGHT: 143 LBS | HEART RATE: 88 BPM | DIASTOLIC BLOOD PRESSURE: 65 MMHG | SYSTOLIC BLOOD PRESSURE: 135 MMHG | BODY MASS INDEX: 27 KG/M2

## 2024-05-13 DIAGNOSIS — E83.42 HYPOMAGNESEMIA: Primary | ICD-10-CM

## 2024-05-13 NOTE — PROGRESS NOTES
Consult Requested By: Dr. 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    She has  hypomagnesemia since feb 2024. Reviewed labs no mag levels done before. Never had issues before.   No diarrhea, vomiting. No diuretic use. Appetite is fair as well  Noted to be on PPI for a long time    Accompanied by daughter      HISTORY:  Past Medical History:    Anxiety state    Cancer (HCC)    breast cancer 2018    Chronic obstructive pulmonary disease, unspecified (HCC)    Chronic obstructive pulmonary disease, unspecified (HCC)    COPD (chronic obstructive pulmonary disease) (HCC)    Diabetes (HCC)    Diabetes mellitus (HCC)    Essential hypertension    Exposure to medical diagnostic radiation    High blood pressure    High cholesterol    History of blood transfusion    low hemoglobin    Osteoarthritis    PONV (postoperative nausea and vomiting)    Pulmonary embolism (HCC)    Pulmonary emphysema (HCC)    Rheumatoid arthritis (HCC)      Past Surgical History:   Procedure Laterality Date    Cataract extraction w/  intraocular lens implant Bilateral [facility]    [facility]     Colonoscopy      Colonoscopy N/A 07/21/2022    Procedure: COLONOSCOPY;  Surgeon: Mikey Garcia MD;  Location: [facility] ENDOSCOPY    Colonoscopy N/A 06/15/2023    Procedure: COLONOSCOPY;  Surgeon: Mikey Garcia MD;  Location: [facility] ENDOSCOPY    Correct bunion,simple      right foot  1993    Egd  12/21/2023    Dr. Garcia; Duodenal AVM's x4 cauterized    Hysterectomy      1972, no abnormal Paps, due to heavy bleeding with fibroids.  Not sure if it had bilateral salpingo-oophorectomy.    Ir aneurysm repairm  2010    Computer assisted volumetric craniofomy with clipping of anterior cerebral artery.    Lumpectomy right      Radiation  right      Rotator cuff repair          Total knee replacement Right     Total knee replacement Left 2015    Transoral incisionless fundoplication - internal  2012 Fredy fundoplication at [facility] Dr. Fournier.    Fredy fundoplication at [facility] Dr. Fournier.    Trigger finger release      left hand      Yag capsulotomy - ou - both eyes Bilateral 2021    done in [place]      Family History   Problem Relation Age of Onset    Heart Surgery Mother         Leaky valve at 41 y/o.     Prostate Cancer Brother     Cancer Brother     Diabetes Maternal Grandmother     Diabetes Paternal Aunt     Breast Cancer Self 78    Breast Cancer Niece         before 50    Macular degeneration Neg     Glaucoma Neg       Social History:   Social History     Socioeconomic History    Marital status:    Tobacco Use    Smoking status: Former     Current packs/day: 0.00     Types: Cigarettes     Quit date:      Years since quittin.3    Smokeless tobacco: Never    Tobacco comments:     [id number]   Vaping Use    Vaping status: Never Used   Substance and Sexual Activity    Alcohol use: Never    Drug use: Never   Social History Narrative    Just moved in from [place].   passed away and that is why moved from [place] to be with daughter who lives in [place].  Currently lives with daughter.     Social Determinants of Health     Financial Resource Strain: Low Risk  (2024)    Financial Resource Strain     Difficulty of Paying Living Expenses: Not hard at all     Med Affordability: No   Food Insecurity: No Food Insecurity (2024)    Food Insecurity     Food Insecurity: Never true   Transportation Needs: No Transportation Needs (2024)    Transportation Needs     Lack of Transportation: No   Housing Stability: Low Risk  (2024)    Housing Stability     Housing Instability: No        Medications (Active prior to today's visit):  Current Outpatient  Medications   Medication Sig Dispense Refill    glipiZIDE 5 MG Oral Tab Take 1 tablet (5 mg total) by mouth 2 (two) times daily before meals. 180 tablet 1    cyclobenzaprine 10 MG Oral Tab Take 1 tablet (10 mg total) by mouth nightly as needed. 90 tablet 1    tamsulosin 0.4 MG Oral Cap Take 1 capsule (0.4 mg total) by mouth daily. 90 capsule 1    carvedilol 12.5 MG Oral Tab Take 1 tablet (12.5 mg total) by mouth 2 (two) times daily with meals. 180 tablet 3    predniSONE 5 MG Oral Tab Take 1 tablet (5 mg total) by mouth daily. 90 tablet 1    fluticasone-salmeterol (WIXELA INHUB) 250-50 MCG/ACT Inhalation Aerosol Powder, Breath Activated Inhale 1 puff into the lungs every 12 (twelve) hours. 1 each 5    gabapentin 100 MG Oral Cap Take 1 capsule (100 mg total) by mouth nightly. 90 capsule 3    LORATADINE 10 MG Oral Tab TAKE 1 TABLET BY MOUTH EVERY DAY 90 tablet 1    hydroxychloroquine 200 MG Oral Tab Take 1 tablet (200 mg total) by mouth daily. 90 tablet 1    rosuvastatin (CRESTOR) 20 MG Oral Tab Take 1 tablet (20 mg total) by mouth nightly. 90 tablet 0    omeprazole 20 MG Oral Capsule Delayed Release Take 1 capsule (20 mg total) by mouth every morning. 90 capsule 1    montelukast 10 MG Oral Tab Take 1 tablet (10 mg total) by mouth nightly. 90 tablet 3    valsartan 80 MG Oral Tab Take 1 tablet (80 mg total) by mouth daily. 90 tablet 3    albuterol 108 (90 Base) MCG/ACT Inhalation Aero Soln inhale 2 puff by inhalation route  every 4 - 6 hours as needed 1 each 5    acetaminophen 500 MG Oral Tab Take 1 tablet (500 mg total) by mouth daily.      Lancets Does not apply Misc Dx. E11.9. Checks qam. 50 each 6    Blood Glucose Monitoring Suppl (BLOOD GLUCOSE SYSTEM GILLES) Does not apply Kit Dx. E11.9. Checks qam. 1 kit 0    Blood Glucose Monitoring Suppl w/Device Does not apply Kit Dx. E11.9. Checks qam. 1 kit 0    Blood Gluc Meter Disp-Strips Does not apply Device Dx. E11.9. Checks qam. 100 each 0    midodrine 2.5 MG Oral Tab  Take 1 tablet (2.5 mg total) by mouth in the morning and 1 tablet (2.5 mg total) at noon and 1 tablet (2.5 mg total) in the evening. Hold if BP > 120/80.. (Patient not taking: Reported on 5/13/2024) 90 tablet 0    Accu-Chek FastClix Lancets Does not apply Misc Check sugar once daily as directed (E11.42) 100 each 2    Glucose Blood (ACCU-CHEK GUIDE) In Vitro Strip Check blood sugar twice a day (fasting and before dinner). DX: E11.42, NIDDM 200 strip 1    glycopyrrolate 1 MG Oral Tab Take 1 tablet (1 mg total) by mouth 3 (three) times daily. (Patient not taking: Reported on 5/13/2024) 90 tablet 1    albuterol 108 (90 Base) MCG/ACT Inhalation Aero Soln inhale 2 puff by inhalation route  every 4 - 6 hours as needed (Patient not taking: Reported on 4/11/2024) 1 each 0    Blood Glucose Monitoring Suppl (ONETOUCH VERIO) w/Device Does not apply Kit 1 each daily. Test 1x daily 1 kit 0    Glucose Blood (ONETOUCH VERIO) In Vitro Strip Test 1x daily 100 strip 1    OneTouch Delica Lancets 33G Does not apply Misc 4 x daily 100 each 1    Blood Glucose Monitoring Suppl (BLOOD GLUCOSE SYSTEM GILLES) Does not apply Kit DX E 11.9.  Checks every morning. 1 kit 0    Lancets Does not apply Misc DX E 11.9.  Checks every morning. 50 each 11    Blood Glucose Monitoring Suppl w/Device Does not apply Kit DX E 11.9.  Checks every morning. 1 kit 0    Blood Gluc Meter Disp-Strips Does not apply Device DX E 11.9.  Checks every morning. 100 each 11       Allergies:  Allergies   Allergen Reactions    Celebrex [Celecoxib] PALPITATIONS    Penicillins ITCHING and SWELLING    Metformin And Related UNKNOWN    Olmesartan UNKNOWN         ROS:     Constitutional:  Negative for decreased activity, fever, irritability and lethargy  ENMT:  Negative for ear drainage, hearing loss and nasal drainage  Eyes:  Negative for eye discharge and vision loss  Cardiovascular:  Negative for chest pain, sobs  Respiratory:  Negative for cough, dyspnea and  wheezing  Gastrointestinal:  Negative for abdominal pain, constipation  Genitourinary:  Negative for dysuria and hematuria  Endocrine:  Negative for abnormal sleep patterns, increased activity  Hema/Lymph:  Negative for easy bleeding and easy bruising  Integumentary:  Negative for pruritus and rash  Musculoskeletal:  Negative for bone/joint symptoms  Neurological:  Negative for gait disturbance  Psychiatric:  Negative for inappropriate interaction and psychiatric symptoms      Vitals:    05/13/24 1345   BP: 135/65   Pulse: 88       PHYSICAL EXAM:   Constitutional: appears well hydrated alert and responsive no acute distress noted  Head/Face: normocephalic  Eyes/Vision: normal extraocular motion is intact  Nose/Mouth/Throat:mucous membranes are moist   Neck/Thyroid: neck is supple without adenopathy  Lymphatic: no abnormal cervical, supraclavicular adenopathy is noted  Respiratory:  lungs are clear to auscultation bilaterally  Cardiovascular: regular rate and rhythm  Abdomen: soft, non-tender, non-distended, BS normal  Skin/Hair: no unusual rashes present  Back/Spine: no abnormalities noted  Musculoskeletal:  no deformities  Extremities: no edema  Neurological:  Grossly normal    Lab Results   Component Value Date     03/25/2024     03/15/2024     03/13/2024    K 3.5 03/25/2024    K 4.1 03/15/2024    K 3.6 03/13/2024     03/25/2024     03/15/2024     03/13/2024    CO2 26.0 03/25/2024    CO2 26.0 03/15/2024    CO2 27.0 03/13/2024    BUN 13 03/25/2024    BUN 27 (H) 03/15/2024    BUN 29 (H) 03/13/2024    CREATSERUM 0.88 03/25/2024    CREATSERUM 1.18 (H) 03/15/2024    CREATSERUM 1.53 (H) 03/13/2024    CA 8.7 03/25/2024    CA 8.3 (L) 03/15/2024    CA 9.0 03/13/2024    EGFRCR 85 04/26/2024    EGFRCR 64 03/25/2024    EGFRCR 45 (L) 03/15/2024    ALB 3.5 03/25/2024    ALB 3.6 03/15/2024    ALB 3.9 03/13/2024      ASSESSMENT/PLAN:   Assessment   1. Hypomagnesemia  - Renal Function Panel;  Future  - Magnesium; Future  - Urinalysis, Routine; Future       Hypomagnesemia  Likely sec to omeprazole causing impaired absorption of magnesium by intestinal epithelial celld bc of ppi induced inhibition of TRPM^ and TRPM7 channels.  Taking mag oxide 800 mg po four times a day   Recmmend gettig off PPI iF ok with GI and trying pepcid or similar products  Once able to stay off PPI for two weeks we can try tapering magnesium and monitoring levels closely  If hads to be on ppi then prob will need to be on mag oxide as well.  If pills dont work then will need IV infusions    PLAN    Call me once off PPI for 2 weeks  And can taper mag  For now continue 800 mg po 4 times a day  High mag diet       Orders This Visit:  Orders Placed This Encounter   Procedures    Renal Function Panel    Magnesium    Urinalysis, Routine       Meds This Visit:  Requested Prescriptions      No prescriptions requested or ordered in this encounter       Imaging & Referrals:  None     Hannah Encinas MD  5/13/2024

## 2024-05-13 NOTE — PATIENT INSTRUCTIONS
Try coming off omeprazole and switching to pepcid  Once off of it for 2 weeks will get magnesium levels and try weaning mag oxide  Labs and fu in 4 weeks

## 2024-05-14 RX ORDER — VALSARTAN 80 MG/1
80 TABLET ORAL DAILY
Qty: 90 TABLET | Refills: 3 | Status: SHIPPED | OUTPATIENT
Start: 2024-05-14

## 2024-05-14 NOTE — TELEPHONE ENCOUNTER
Please review. Protocol Failed; No Protocol    Requested Prescriptions   Pending Prescriptions Disp Refills    VALSARTAN 80 MG Oral Tab [Pharmacy Med Name: VALSARTAN 80 MG TABLET] 90 tablet 3     Sig: TAKE 1 TABLET BY MOUTH EVERY DAY       Hypertension Medications Protocol Failed - 5/12/2024  8:41 AM        Failed - Last BP reading less than 140/90     BP Readings from Last 1 Encounters:   05/13/24 135/65               Passed - CMP or BMP in past 12 months        Passed - In person appointment or virtual visit in the past 12 mos or appointment in next 3 mos     Recent Outpatient Visits              Yesterday Hypomagnesemia    [facility] Hannah Encinas MD    Office Visit    4 days ago Encounter for care related to vascular access port    [facility] - Infusion    Nurse Only    6 days ago Encounter for care related to vascular access port    [facility] - Infusion    Nurse Only    2 weeks ago Encounter for care related to vascular access port    [facility] - Infusion    Nurse Only    3 weeks ago Encounter for care related to vascular access port    [facility]    Office Visit          Future Appointments         Provider Department Appt Notes    In 1 week Vic Thakkar APRN [facility] 3 months    In 1 week EM CC LAB2 [facility] - Infusion MAG-PORT-MYJ* order in order inquiry*    In 3 weeks Hannah Encinas MD [facility] 4 wk f/u    In 3 weeks EM CC LAB2 [facility] - Infusion OCTREOTIDE. IM. CL    In 3 weeks EM CC LAB2 [facility] - Infusion [id number]    In 3 weeks Eric Mohamud MD [facility] Hematology Oncology 8 WEEK FOLLOW UP    In 2 months Quinton Tesfaye MD [facility]  1 YEAR F/U    In 4 months Chapin Parker MD [facility] yearly    In 5 months Chapin Parker MD [facility] 4 - 6 mos                    Passed - EGFRCR or GFRAA > 50     GFR Evaluation  EGFRCR: 85 , resulted on 4/26/2024                 Future Appointments         Provider Department Appt Notes    In 1 week Vic Thakkar APRN [facility] 3 months    In 1 week EM CC LAB2 [facility] - Infusion MAG-PORT-MYJ* order in order inquiry*    In 3 weeks Hannah Encinas MD [facility] 4 wk f/u    In 3 weeks EM CC LAB2 [facility] - Infusion OCTREOTIDE. IM. CL    In 3 weeks EM CC LAB2 [facility] - Infusion [id number]-PORT-[id number]    In 3 weeks Eric Mohamud MD [facility] Hematology Oncology 8 WEEK FOLLOW UP    In 2 months Quinton Tesfaye MD [facility] 1 YEAR F/U    In 4 months Chapin Parker MD [facility] yearly    In 5 months Chapin Parker MD [facility] 4 - 6 mos          Recent Outpatient Visits              Yesterday Hypomagnesemia    [facility] Hannah Enicnas MD    Office Visit    4 days ago Encounter for care related to vascular access port    [facility] - Infusion    Nurse Only    6 days ago Encounter for care related to vascular access port    [facility] - Infusion    Nurse Only    2 weeks ago Encounter for care related to vascular access port    [facility] - Infusion    Nurse Only    3 weeks ago Encounter for care related to vascular access port    [facility] - Infusion    Office Visit

## 2024-05-20 ENCOUNTER — TELEPHONE (OUTPATIENT)
Dept: INTERNAL MEDICINE CLINIC | Facility: CLINIC | Age: 84
End: 2024-05-20

## 2024-05-20 DIAGNOSIS — E11.9 TYPE 2 DIABETES MELLITUS WITHOUT RETINOPATHY (HCC): Primary | ICD-10-CM

## 2024-05-21 ENCOUNTER — OFFICE VISIT (OUTPATIENT)
Dept: ENDOCRINOLOGY CLINIC | Facility: CLINIC | Age: 84
End: 2024-05-21

## 2024-05-21 VITALS
HEART RATE: 95 BPM | SYSTOLIC BLOOD PRESSURE: 170 MMHG | WEIGHT: 143 LBS | HEIGHT: 61 IN | DIASTOLIC BLOOD PRESSURE: 78 MMHG | BODY MASS INDEX: 27 KG/M2

## 2024-05-21 DIAGNOSIS — E11.42 TYPE 2 DIABETES MELLITUS WITH DIABETIC POLYNEUROPATHY, WITHOUT LONG-TERM CURRENT USE OF INSULIN (HCC): Primary | ICD-10-CM

## 2024-05-21 LAB
GLUCOSE BLOOD: 332
HEMOGLOBIN A1C: 9 % (ref 4.3–5.6)
TEST STRIP LOT #: NORMAL NUMERIC

## 2024-05-21 PROCEDURE — 1159F MED LIST DOCD IN RCRD: CPT | Performed by: NURSE PRACTITIONER

## 2024-05-21 PROCEDURE — 3077F SYST BP >= 140 MM HG: CPT | Performed by: NURSE PRACTITIONER

## 2024-05-21 PROCEDURE — 83036 HEMOGLOBIN GLYCOSYLATED A1C: CPT | Performed by: NURSE PRACTITIONER

## 2024-05-21 PROCEDURE — 3078F DIAST BP <80 MM HG: CPT | Performed by: NURSE PRACTITIONER

## 2024-05-21 PROCEDURE — 3008F BODY MASS INDEX DOCD: CPT | Performed by: NURSE PRACTITIONER

## 2024-05-21 PROCEDURE — 82947 ASSAY GLUCOSE BLOOD QUANT: CPT | Performed by: NURSE PRACTITIONER

## 2024-05-21 PROCEDURE — 99214 OFFICE O/P EST MOD 30 MIN: CPT | Performed by: NURSE PRACTITIONER

## 2024-05-21 RX ORDER — GLIPIZIDE 5 MG/1
10 TABLET ORAL
Qty: 180 TABLET | Refills: 0 | Status: SHIPPED | OUTPATIENT
Start: 2024-05-21 | End: 2024-08-19

## 2024-05-21 NOTE — PATIENT INSTRUCTIONS
A1C: 9.0% today --> previously was 9.5% on 2/2/2024  Blood glucose: 332 in clinic today    Medications:   - increase Glipizide 7.5 --> 10mg with breakfast     - please give me an update on your blood glucose readings on Friday 5/24    -repeat protein urine lab test    Weight:  Wt Readings from Last 6 Encounters:   05/21/24 143 lb (64.9 kg)   05/13/24 143 lb (64.9 kg)   04/11/24 138 lb (62.6 kg)   04/05/24 139 lb (63 kg)   03/25/24 137 lb (62.1 kg)   03/22/24 140 lb (63.5 kg)     A1C goal:  <7.5%    Blood sugar testing:  Test your blood sugar 1 time daily   Recommended times to test: alterate with before breakfast (fasting) or before lunch or before dinner     Blood sugar targets:  Before breakfast:  (preferably < 110)  Before meals OR 2 hours after meals: <180 (preferably <150)     Call for persistent blood sugars < 75 or > 200

## 2024-05-21 NOTE — PROGRESS NOTES
Name: Chester Bautista  Date: 5/21/2024    CHIEF COMPLAINT   Chief Complaint   Patient presents with    Diabetes     HISTORY OF PRESENT ILLNESS   Chester Bautista is a 84 year old female who presents for follow up on diabetes management. Patient is accompanied by her daughter Tommy today.   HbA1C: 9.0% at POC today. Previously was 9.5% on 2/2/2024.   Blood glucose is: 332 in clinic today.   Patient notes that she is currently feeling better, although at times still feels weak and fatigue.  She was hospitalized for a second time in 2/2024 with pulmonary embolism.   Patient notes that she continued to follow a low carbohydrate diet and continues to be cautious with sweets intake.  Since recent hospitalization she has eliminated regular soda and instead is only drinking zero Sprite.   She verbalizes compliance with diabetes medications and using glucometer to check her blood sugar readings.     FAMILY HISTORY OF DIABETES  -paternal grandmother and paternal aunt.   DIABETES HISTORY  Diagnosed: had prediabetes since 2003; diagnosed with T2DM 11/7/21;   Prior HbA, C or glycohemoglobin were 7.2% on 11/7/2021;  7.7% 2/26/2022; 7.3% 5/2/2022; 6.0% 9/1/2022; 6.4% 3/21/2023; 8.1% 6/29/2023; 7.0% 10/18/2023; 9.5% on 2/2/2024; 9.0% at POC today;     PREVIOUS MEDICATION FOR DM:  Metformin -d/c'ed due to allergic reaction - swelling to bottom of lip and tongue itching; also vomiting.     CURRENT MEDICATIONS FOR DM:  Glipizide 7.5mg once daily with breakfast     HOME GLUCOSE READINGS:   Testing 1-2x daily   After breakfast: 210s; 150s-180s  Before breakfast: 110-140s   After dinner: 180-190s; 200 at times    Hypoglycemia symptoms: denies symptoms  Hyperglycemia symptoms: no     HISTORY OF DIABETES COMPLICATIONS:  History of Retinopathy: no  - last eye exam within the last 12 months: yes  History of Neuropathy: no   History of Nephropathy: no     ASSOCIATED COMPLICATIONS:   HTN: yes   Hyperlipidemia: yes    Cardiovascular Disease: no   Peripheral Vascular Disease: no     DIETARY COMPLIANCE:  Fair; tries to eat a low carbohydrate diet and avoids sweets although does consume occasionally  -Has substituted regular soda for diet soda.     EXERCISE:   No -has not been exercising once PT was complete    Polyuria, polyphagia, polydipsia: no   Paresthesias: no   Blurred vision: no   Recent steroids, illness or infections: yes  - on prednisone for pain - RA; has been taking since 7-9/2021    REVIEW OF SYSTEMS  Constitutional: Negative for: weight change, fever, fatigue, cold/heat intolerance  Eyes: Negative for:  Visual changes, proptosis, blurring  ENT: Negative for:  dysphagia, neck swelling, dysphonia  Respiratory: Negative for: hemoptysis, shortness of breath, cough, or dyspnea.  Cardiovascular: Negative for:  chest pain, chest discomfort, palpitations  GI: Negative for:  abdominal pain, nausea, vomiting, diarrhea, heartburn, constipation  Neurology: Negative for: headache, dizziness, syncope, numbness/tingling, or weakness.   Genito-Urinary: Negative for: dysuria, frequency or hematuria   Hematology/Lymphatics: Negative for: bruising, easy bleeding, lower extremity edema  Skin: Negative for: rash, blister, infection or ulcers.  Endocrine: Negative for: polyuria, polydipsia. No osteoporosis. No thyroid disease.     MEDICATIONS:     Current Outpatient Medications:     valsartan 80 MG Oral Tab, Take 1 tablet (80 mg total) by mouth daily., Disp: 90 tablet, Rfl: 3    glipiZIDE 5 MG Oral Tab, Take 1 tablet (5 mg total) by mouth 2 (two) times daily before meals., Disp: 180 tablet, Rfl: 1    cyclobenzaprine 10 MG Oral Tab, Take 1 tablet (10 mg total) by mouth nightly as needed., Disp: 90 tablet, Rfl: 1    tamsulosin 0.4 MG Oral Cap, Take 1 capsule (0.4 mg total) by mouth daily., Disp: 90 capsule, Rfl: 1    carvedilol 12.5 MG Oral Tab, Take 1 tablet (12.5 mg total) by mouth 2 (two) times daily with meals., Disp: 180 tablet, Rfl:  3    predniSONE 5 MG Oral Tab, Take 1 tablet (5 mg total) by mouth daily., Disp: 90 tablet, Rfl: 1    fluticasone-salmeterol (WIXELA INHUB) 250-50 MCG/ACT Inhalation Aerosol Powder, Breath Activated, Inhale 1 puff into the lungs every 12 (twelve) hours., Disp: 1 each, Rfl: 5    Lancets Does not apply Misc, Dx. E11.9. Checks qam. (Patient not taking: Reported on 5/21/2024), Disp: 50 each, Rfl: 6    Blood Glucose Monitoring Suppl (BLOOD GLUCOSE SYSTEM GILLES) Does not apply Kit, Dx. E11.9. Checks qam. (Patient not taking: Reported on 5/21/2024), Disp: 1 kit, Rfl: 0    Blood Glucose Monitoring Suppl w/Device Does not apply Kit, Dx. E11.9. Checks qam. (Patient not taking: Reported on 5/21/2024), Disp: 1 kit, Rfl: 0    Blood Gluc Meter Disp-Strips Does not apply Device, Dx. E11.9. Checks qam. (Patient not taking: Reported on 5/21/2024), Disp: 100 each, Rfl: 0    gabapentin 100 MG Oral Cap, Take 1 capsule (100 mg total) by mouth nightly., Disp: 90 capsule, Rfl: 3    midodrine 2.5 MG Oral Tab, Take 1 tablet (2.5 mg total) by mouth in the morning and 1 tablet (2.5 mg total) at noon and 1 tablet (2.5 mg total) in the evening. Hold if BP > 120/80.. (Patient not taking: Reported on 5/13/2024), Disp: 90 tablet, Rfl: 0    LORATADINE 10 MG Oral Tab, TAKE 1 TABLET BY MOUTH EVERY DAY, Disp: 90 tablet, Rfl: 1    Accu-Chek FastClix Lancets Does not apply Misc, Check sugar once daily as directed (E11.42) (Patient not taking: Reported on 5/21/2024), Disp: 100 each, Rfl: 2    hydroxychloroquine 200 MG Oral Tab, Take 1 tablet (200 mg total) by mouth daily., Disp: 90 tablet, Rfl: 1    Glucose Blood (ACCU-CHEK GUIDE) In Vitro Strip, Check blood sugar twice a day (fasting and before dinner). DX: E11.42, NIDDM, Disp: 200 strip, Rfl: 1    rosuvastatin (CRESTOR) 20 MG Oral Tab, Take 1 tablet (20 mg total) by mouth nightly., Disp: 90 tablet, Rfl: 0    omeprazole 20 MG Oral Capsule Delayed Release, Take 1 capsule (20 mg total) by mouth every  morning., Disp: 90 capsule, Rfl: 1    glycopyrrolate 1 MG Oral Tab, Take 1 tablet (1 mg total) by mouth 3 (three) times daily. (Patient not taking: Reported on 5/13/2024), Disp: 90 tablet, Rfl: 1    albuterol 108 (90 Base) MCG/ACT Inhalation Aero Soln, inhale 2 puff by inhalation route  every 4 - 6 hours as needed (Patient not taking: Reported on 4/11/2024), Disp: 1 each, Rfl: 0    montelukast 10 MG Oral Tab, Take 1 tablet (10 mg total) by mouth nightly., Disp: 90 tablet, Rfl: 3    albuterol 108 (90 Base) MCG/ACT Inhalation Aero Soln, inhale 2 puff by inhalation route  every 4 - 6 hours as needed, Disp: 1 each, Rfl: 5    Blood Glucose Monitoring Suppl (ONETOUCH VERIO) w/Device Does not apply Kit, 1 each daily. Test 1x daily (Patient not taking: Reported on 5/21/2024), Disp: 1 kit, Rfl: 0    Glucose Blood (ONETOUCH VERIO) In Vitro Strip, Test 1x daily, Disp: 100 strip, Rfl: 1    OneTouch Delica Lancets 33G Does not apply Misc, 4 x daily (Patient not taking: Reported on 5/21/2024), Disp: 100 each, Rfl: 1    Blood Glucose Monitoring Suppl (BLOOD GLUCOSE SYSTEM GILLES) Does not apply Kit, DX E 11.9.  Checks every morning. (Patient not taking: Reported on 5/21/2024), Disp: 1 kit, Rfl: 0    Lancets Does not apply Misc, DX E 11.9.  Checks every morning. (Patient not taking: Reported on 5/21/2024), Disp: 50 each, Rfl: 11    Blood Glucose Monitoring Suppl w/Device Does not apply Kit, DX E 11.9.  Checks every morning. (Patient not taking: Reported on 5/21/2024), Disp: 1 kit, Rfl: 0    Blood Gluc Meter Disp-Strips Does not apply Device, DX E 11.9.  Checks every morning. (Patient not taking: Reported on 5/21/2024), Disp: 100 each, Rfl: 11    acetaminophen 500 MG Oral Tab, Take 1 tablet (500 mg total) by mouth daily., Disp: , Rfl:     ALLERGIES:   Allergies   Allergen Reactions    Celebrex [Celecoxib] PALPITATIONS    Penicillins ITCHING and SWELLING    Metformin And Related UNKNOWN    Olmesartan UNKNOWN       SOCIAL HISTORY:    Social History     Socioeconomic History    Marital status:    Tobacco Use    Smoking status: Former     Current packs/day: 0.00     Types: Cigarettes     Quit date:      Years since quittin.4     Passive exposure: Never    Smokeless tobacco: Never    Tobacco comments:     [id number]   Vaping Use    Vaping status: Never Used   Substance and Sexual Activity    Alcohol use: Never    Drug use: Never     PAST MEDICAL HISTORY:   Past Medical History:    Anxiety state    Cancer (HCC)    breast cancer 2018    Chronic obstructive pulmonary disease, unspecified (HCC)    Chronic obstructive pulmonary disease, unspecified (HCC)    COPD (chronic obstructive pulmonary disease) (HCC)    Diabetes (HCC)    Diabetes mellitus (HCC)    Essential hypertension    Exposure to medical diagnostic radiation    High blood pressure    High cholesterol    History of blood transfusion    low hemoglobin    Osteoarthritis    PONV (postoperative nausea and vomiting)    Pulmonary embolism (HCC)    Pulmonary emphysema (HCC)    Rheumatoid arthritis (HCC)       PAST SURGICAL HISTORY:   Past Surgical History:   Procedure Laterality Date    Cataract extraction w/  intraocular lens implant Bilateral 2017    Dr in [facility]     Colonoscopy      Colonoscopy N/A 2022    Procedure: COLONOSCOPY;  Surgeon: Mikey Garcia MD;  Location: [facility] ENDOSCOPY    Colonoscopy N/A 06/15/2023    Procedure: COLONOSCOPY;  Surgeon: Mikey Garcia MD;  Location: [facility] ENDOSCOPY    Correct bunion,simple      right foot      Egd  2023    Dr. Garcia; Duodenal AVM's x4 cauterized    Hysterectomy      , no abnormal Paps, due to heavy bleeding with fibroids.  Not sure if it had bilateral salpingo-oophorectomy.    Ir aneurysm repairm  2010    Computer assisted volumetric craniofomy with clipping of anterior cerebral artery.    Lumpectomy right      Radiation right      Rotator cuff repair      1993    Total knee replacement Right 2010    Total  knee replacement Left 07/02/2015    Transoral incisionless fundoplication - internal  01/25/2012 Fredy fundoplication at [facility] Dr. Fournier.    Fredy fundoplication at [facility] Dr. Fournier.    Trigger finger release      left hand  2005    Yag capsulotomy - ou - both eyes Bilateral 09/2021    done in [place]       PHYSICAL EXAM:   Vitals:    05/21/24 1523   BP: (!) 170/78   Pulse: 95   Weight: 143 lb (64.9 kg)   Height: 5' 1\" (1.549 m)       BMI:   Body mass index is 27.02 kg/m².    General Appearance:  alert, well developed, in no acute distress  Nutritional:  no extreme weight gain or loss  Head: Atraumatic  Eyes:  normal conjunctivae, sclera., normal sclera and normal pupils  Throat/Neck: normal sound to voice. Normal hearing, normal speech  Back: no kyphosis  Respiratory:  Speaking in full sentences, non-labored. no increased work of breathing, no audible wheezing    Skin:  normal moisture and skin texture, no visible lesions  Hair and nails: normal scalp hair  Hematologic:  no excessive bruising  Neuro: motor grossly intact, moving all extremities without difficulty  Psychiatric:  oriented to time, self, and place  Extremities: no obvious extremity swelling, no lesions    LABS: Pertinent labs reviewed    ASSESSMENT/PLAN:    -Reviewed with patient the pathogenesis of diabetes, clinical significance of A1c, and common complications such as: microvascular, macrovascular and diabetic ketoacidosis. Patient verbalizes understanding of the importance of glycemic control and the goals of therapy.   Per ADA 2024 guidelines, it is recommended that older adults (age 65 and above) who are healthy with few coexisting chronic illnesses, intact cognitive and functional status, should have A1C goal of <7.5%, fasting or preprandial glucose of  and bedtime glucose of .     1.Type 2 Diabetes Mellitus, uncontrolled   -LAB DATA  HbA,C: 9.0% today   a) Medications  -Increase glipizide  7.5-->10mg with breakfast - Risks and benefits reviewed. Verbalizes understanding.      -CGM sensor applied in office today and connected with her phone application to closely monitor her glucose patterns.  Will follow-up on BG readings on  for further evaluation on glucose patterns.   - discussed that we may need to utilize insulin to help further stabilize glucose patterns.   -Discussed to continue to follow a low carbohydrate diet is currently doing  -Discussed to work on decreasing sweets intake  -discussed to continue with exercise and staying active as much as safely able   -reviewed target goal BG readings and A1C  -reviewed when to call and notify me of abnormal BG readings.     b) No nephropathy: GFR: 77 on 2/3/2024 and urine MA: 120.7 on 3/10/2023 --> repeat urine MA  c) UTD with optho - has apt on - with Dr. Melo   D)foot exam: abnormal on 2/15/2024  e) cont. testing BG readings 1x daily- discussed to start alternating testing times with fasting or 2hrs after meals.   f) Life style changes reviewed    2.dyslipidemia   -LDL: 102 and Tri on 2023  -Atorvastatin 80mg at bedtime      RTC in 3 months   Patient instructed to call sooner if they develop Blood glucose readings <75 and/or if they have readings persistently >200.     The risks and benefits of my recommendations were discussed with the patient today. questions were also answered to the best of my knowledge. Patient verbalizes understanding of these issues and agrees to the plan.    2024  BILL Swain

## 2024-05-24 ENCOUNTER — APPOINTMENT (OUTPATIENT)
Dept: HEMATOLOGY/ONCOLOGY | Facility: HOSPITAL | Age: 84
End: 2024-05-24
Attending: INTERNAL MEDICINE
Payer: MEDICARE

## 2024-05-24 ENCOUNTER — TELEPHONE (OUTPATIENT)
Dept: ENDOCRINOLOGY CLINIC | Facility: CLINIC | Age: 84
End: 2024-05-24

## 2024-05-24 DIAGNOSIS — E11.65 UNCONTROLLED TYPE 2 DIABETES MELLITUS WITH HYPERGLYCEMIA (HCC): Primary | ICD-10-CM

## 2024-05-24 NOTE — TELEPHONE ENCOUNTER
Endo staff- patient applied a van personal cgm on Tuesday and I am suppose to review her glucose readings today.     I do not see that she is connected with out clinic. Please reach out to her to confirm that she is wearing and also help with connecting her with out clinic.     Thank you!

## 2024-05-28 ENCOUNTER — NURSE ONLY (OUTPATIENT)
Dept: HEMATOLOGY/ONCOLOGY | Facility: HOSPITAL | Age: 84
End: 2024-05-28
Attending: INTERNAL MEDICINE
Payer: MEDICARE

## 2024-05-28 ENCOUNTER — TELEPHONE (OUTPATIENT)
Facility: CLINIC | Age: 84
End: 2024-05-28

## 2024-05-28 DIAGNOSIS — K90.9 MALABSORPTION OF IRON (HCC): ICD-10-CM

## 2024-05-28 DIAGNOSIS — D50.0 IRON DEFICIENCY ANEMIA DUE TO CHRONIC BLOOD LOSS: ICD-10-CM

## 2024-05-28 DIAGNOSIS — Z45.2 ENCOUNTER FOR CARE RELATED TO VASCULAR ACCESS PORT: Primary | ICD-10-CM

## 2024-05-28 DIAGNOSIS — D50.9 IRON DEFICIENCY ANEMIA, UNSPECIFIED IRON DEFICIENCY ANEMIA TYPE: ICD-10-CM

## 2024-05-28 DIAGNOSIS — Q27.30 AVM (ARTERIOVENOUS MALFORMATION) (HCC): ICD-10-CM

## 2024-05-28 LAB
BASOPHILS # BLD AUTO: 0.02 X10(3) UL (ref 0–0.2)
BASOPHILS NFR BLD AUTO: 0.2 %
DEPRECATED RDW RBC AUTO: 50.4 FL (ref 35.1–46.3)
EOSINOPHIL # BLD AUTO: 0.07 X10(3) UL (ref 0–0.7)
EOSINOPHIL NFR BLD AUTO: 0.6 %
ERYTHROCYTE [DISTWIDTH] IN BLOOD BY AUTOMATED COUNT: 14.7 % (ref 11–15)
HCT VFR BLD AUTO: 29.1 %
HGB BLD-MCNC: 9.4 G/DL
IMM GRANULOCYTES # BLD AUTO: 0.1 X10(3) UL (ref 0–1)
IMM GRANULOCYTES NFR BLD: 0.9 %
LYMPHOCYTES # BLD AUTO: 0.62 X10(3) UL (ref 1–4)
LYMPHOCYTES NFR BLD AUTO: 5.4 %
MCH RBC QN AUTO: 30.2 PG (ref 26–34)
MCHC RBC AUTO-ENTMCNC: 32.3 G/DL (ref 31–37)
MCV RBC AUTO: 93.6 FL
MONOCYTES # BLD AUTO: 0.5 X10(3) UL (ref 0.1–1)
MONOCYTES NFR BLD AUTO: 4.4 %
NEUTROPHILS # BLD AUTO: 10.07 X10 (3) UL (ref 1.5–7.7)
NEUTROPHILS # BLD AUTO: 10.07 X10(3) UL (ref 1.5–7.7)
NEUTROPHILS NFR BLD AUTO: 88.5 %
PLATELET # BLD AUTO: 228 10(3)UL (ref 150–450)
RBC # BLD AUTO: 3.11 X10(6)UL
WBC # BLD AUTO: 11.4 X10(3) UL (ref 4–11)

## 2024-05-28 PROCEDURE — 36591 DRAW BLOOD OFF VENOUS DEVICE: CPT

## 2024-05-28 PROCEDURE — 85025 COMPLETE CBC W/AUTO DIFF WBC: CPT

## 2024-05-28 RX ORDER — HEPARIN SODIUM (PORCINE) LOCK FLUSH IV SOLN 100 UNIT/ML 100 UNIT/ML
5 SOLUTION INTRAVENOUS ONCE
OUTPATIENT
Start: 2024-06-04

## 2024-05-28 RX ORDER — SODIUM CHLORIDE 9 MG/ML
10 INJECTION, SOLUTION INTRAMUSCULAR; INTRAVENOUS; SUBCUTANEOUS ONCE
OUTPATIENT
Start: 2024-06-04

## 2024-05-28 RX ORDER — HEPARIN SODIUM (PORCINE) LOCK FLUSH IV SOLN 100 UNIT/ML 100 UNIT/ML
5 SOLUTION INTRAVENOUS ONCE
Status: COMPLETED | OUTPATIENT
Start: 2024-05-28 | End: 2024-05-28

## 2024-05-28 RX ADMIN — HEPARIN SODIUM (PORCINE) LOCK FLUSH IV SOLN 100 UNIT/ML 500 UNITS: 100 SOLUTION INTRAVENOUS at 15:22:00

## 2024-05-28 NOTE — TELEPHONE ENCOUNTER
Patient's daughter states that Dr. Garcia said it was OK for patient to use Pepcid instead of omeprazole. Please call.

## 2024-05-28 NOTE — TELEPHONE ENCOUNTER
called and spoke to patient and daughter  Start pepcid tomorrow  After one week decrease mag to three times/ day  2 weeks later to do blood test    And will call with the results and further taper

## 2024-05-29 NOTE — PROGRESS NOTES
CBC abNormal (white blood cells and red blood cells and platelets), white blood cell count is elevated but better than prior.  Anemia is present but stable.  Follow-up with hematology or blood specialist.

## 2024-05-30 ENCOUNTER — TELEPHONE (OUTPATIENT)
Dept: PULMONOLOGY | Facility: CLINIC | Age: 84
End: 2024-05-30

## 2024-05-30 DIAGNOSIS — E04.1 THYROID NODULE: Primary | ICD-10-CM

## 2024-05-30 RX ORDER — PEN NEEDLE, DIABETIC 31 GX5/16"
1 NEEDLE, DISPOSABLE MISCELLANEOUS DAILY
Qty: 100 EACH | Refills: 0 | Status: SHIPPED | OUTPATIENT
Start: 2024-05-30

## 2024-05-30 RX ORDER — INSULIN ASPART 100 [IU]/ML
12 INJECTION, SUSPENSION SUBCUTANEOUS
Qty: 9 ML | Refills: 0 | Status: SHIPPED | OUTPATIENT
Start: 2024-05-30 | End: 2024-08-28

## 2024-05-31 ENCOUNTER — TELEPHONE (OUTPATIENT)
Dept: INTERNAL MEDICINE CLINIC | Facility: CLINIC | Age: 84
End: 2024-05-31

## 2024-05-31 NOTE — TELEPHONE ENCOUNTER
----- Message from Chapin Parker sent at 5/30/2024 11:54 AM CDT -----  RN, please call the patient to let her know that I reviewed the CT scan thyroid abnormality with them and they recommend a thyroid ultrasound now.  She may end up needing the needle aspirate or biopsy of the nodule.  Please facilitate ultrasound of the thyroid.  Kenneth CAMARENA  
Spoke with patient's daughter, Brea (MANUEL on file) informed her of Dr. Parker's order, Brea verbalized understanding.  
No

## 2024-05-31 NOTE — TELEPHONE ENCOUNTER
Reached patient's daughter, Brea,  for medication adherence consult (okay per HIPAA). Patient overdue for refill on atorvastatin per insurance report.    Patient's daughter reports that she is taking rosuvastatin and not atorvastatin. She reports that her mother is tolerating the medication well. She denies the need for refills at this time.    Provided education on importance of adherence. Patient's daughter denies any questions or concerns with medication.

## 2024-05-31 NOTE — TELEPHONE ENCOUNTER
BG readings reviewed and severe hyperglycemia is noted during the day starting around 12-1pm with readings persistently in mid 300s.     BG readings seem to decrease overnight between 2am to  10am-12pm with glucose readings in mid to upper 100s.     Since she is already taking max dose of glipizide (10mg) with breakfast, we will have to transition to insulin to be able to achieve a higher strength.     I would recommend that she stops glipizide and instead starts taking 70/30 insulin. This is an intermediate acting insulin that will help stabilize her glucose readings for 8-12hrs after injecting only. This insulin will not effect overnight glucose readings.     She should inject 12 units with first meal of the day    She should give me another update on her glucose readings on Monday to review glucose readings after starting this new insulin.     Rx sent to pharmacy. Please clarify if she will need education on how to use insulin pen. We can refer to [facility] as needed for education.       Thank you!

## 2024-05-31 NOTE — TELEPHONE ENCOUNTER
Called the patient. Spoke with her daughter Brea. (MANUEL on file). I spoke with both Prudencio and her daughter Brea on speaker. Date of birth verified. Discussed finding of CGM data and recommendations from APRN to stop glipizide and start insulin 70/30. Starting with one injection in the mornings to help stablizide blood sugars for 8-12 hours and not affect sugars during the night.     Prudencio Fabian is wondering if there is another pill that would also only help affect sugars for 8-12 hours? Or are all pills in the body for 24 hours?

## 2024-05-31 NOTE — TELEPHONE ENCOUNTER
There are some oral medications that are 24 hour medications but are glucose dependent and so likely would help with hyperglycemia without causing low blood sugars but I am not sure these are appropriate options for her.     Given the sugars are reaching 300's and Caren is out of the office today, I do recommend they start the insulin as recommended at least for the weekend and then Caren can advise further when she returns to office.

## 2024-05-31 NOTE — TELEPHONE ENCOUNTER
Previous encounters noted.     Unfortunately there are no other pill form medications that would only help stabilize glucose readings for 8-12hrs time frame.     All other oral medications are 24hr acting. Some of the these options, although are 24hr, do not cause hypoglycemia. These options are ozempic/ Trulicity/ Mounjaro (injectable medications) and/or jardiance/farxiga/invokana (pill form medications).     Given her current hyperglycemia, I do not believe these options of medications would help quickly stabilize her glucose readings and she would likely need multiple medications to help stabilize current hyperglycemia  within the next several weeks.       she may pick from the following options:    Option #1) -- will take longer to stabilize glucose readings  - continue with Glipizide 10mg with first meal  - start Ozempic 0.25mg once weekly   - start Farxiga 10mg once daily    * will have to drink around 60oz of water with farxiga to help avoid dehydration       Option #2) -- will quickly stabilize glucose readings   - stop glipizide   - start 70/30 insulin 12 units with first meal       Please let me know which option she would like to proceed with and will adjust prescriptions/treatment plan as needed.     Thank you!

## 2024-05-31 NOTE — TELEPHONE ENCOUNTER
I called and spoke with Brea. She would like [facility] message sent with options and she will discuss those with her mother. They will then notify our office after they have come to a decision on treatment plan.     Maitet sent.

## 2024-05-31 NOTE — TELEPHONE ENCOUNTER
I called the patient and spoke with her daughter Brea. She is not home right now and would like to go over options with her mother present. She will call back later this afternoon before 4pm to discuss options with RN.

## 2024-06-03 NOTE — TELEPHONE ENCOUNTER
BILL Fabian,  Spoke to patient's daughter - she wanted to make sure you are aware that patient is a late riser and usually has first meal of day at noon and then snacks throughout the day  Daughter also wants to confirm that insulin 70/30 would be in pen form and patient would not have to draw insulin from vial - RN advised insulin 70/30 is available in pen form    Please advise if recommendations would change d/t patient having first meal of day at noon -thanks

## 2024-06-04 NOTE — TELEPHONE ENCOUNTER
Spoke to patient's daughter to relay message below - daughter stated understanding for patient to start insulin 70/30 12 units with first meal of day  Patient's daughter stated understanding to stop glipizide when patient starts insulin 70/30  Daughter stated understanding to call with BG update in 2-3 days

## 2024-06-04 NOTE — TELEPHONE ENCOUNTER
Noted. Yes I am aware of her eating patterns, which is why I believe that just 1 injection with first meal of her day would be most suitable frequency for her (instead of twice day as it is usually prescribed).     Yes 70/30 insulin would come in a pen form and this is the formulation that was sent to her pharmacy.    No dose changes are needed given her eating patterns. Patient should stop taking glipizide and start 12 units with first meal (around noon).     She should give me an update on her blood glucose readings 2-3 days after starting 70/30 insulin.     Thank you!

## 2024-06-05 ENCOUNTER — APPOINTMENT (OUTPATIENT)
Dept: HEMATOLOGY/ONCOLOGY | Facility: HOSPITAL | Age: 84
End: 2024-06-05
Attending: INTERNAL MEDICINE
Payer: MEDICARE

## 2024-06-06 ENCOUNTER — TELEPHONE (OUTPATIENT)
Dept: ENDOCRINOLOGY CLINIC | Facility: CLINIC | Age: 84
End: 2024-06-06

## 2024-06-06 DIAGNOSIS — E11.65 UNCONTROLLED TYPE 2 DIABETES MELLITUS WITH HYPERGLYCEMIA (HCC): Primary | ICD-10-CM

## 2024-06-06 DIAGNOSIS — Q27.30 AVM (ARTERIOVENOUS MALFORMATION) (HCC): ICD-10-CM

## 2024-06-06 DIAGNOSIS — D50.0 IRON DEFICIENCY ANEMIA DUE TO CHRONIC BLOOD LOSS: Primary | ICD-10-CM

## 2024-06-06 NOTE — TELEPHONE ENCOUNTER
Patient daughter is calling to see if patient can be scheduled a nurse visti appointment for tomorrow.  She needs to be shown how to use a flex pen and shown how to replace Dexcom Sensor.  Patient [facility] appointment [facility] the [facility] center [facility] at 11:30 and should be done by 1 pm.  She is requesting and appointment for before or after her appointment/

## 2024-06-07 ENCOUNTER — OFFICE VISIT (OUTPATIENT)
Dept: HEMATOLOGY/ONCOLOGY | Facility: HOSPITAL | Age: 84
End: 2024-06-07
Attending: INTERNAL MEDICINE

## 2024-06-07 ENCOUNTER — HOSPITAL ENCOUNTER (OUTPATIENT)
Dept: ENDOCRINOLOGY | Facility: HOSPITAL | Age: 84
Discharge: HOME | End: 2024-06-07
Attending: NURSE PRACTITIONER
Payer: MEDICARE

## 2024-06-07 ENCOUNTER — NURSE ONLY (OUTPATIENT)
Dept: HEMATOLOGY/ONCOLOGY | Facility: HOSPITAL | Age: 84
End: 2024-06-07
Attending: INTERNAL MEDICINE
Payer: MEDICARE

## 2024-06-07 VITALS
RESPIRATION RATE: 16 BRPM | OXYGEN SATURATION: 94 % | DIASTOLIC BLOOD PRESSURE: 78 MMHG | WEIGHT: 142.63 LBS | TEMPERATURE: 98 F | HEART RATE: 81 BPM | BODY MASS INDEX: 26.93 KG/M2 | HEIGHT: 61 IN | SYSTOLIC BLOOD PRESSURE: 167 MMHG

## 2024-06-07 DIAGNOSIS — D50.0 IRON DEFICIENCY ANEMIA DUE TO CHRONIC BLOOD LOSS: ICD-10-CM

## 2024-06-07 DIAGNOSIS — Z95.828 PRESENCE OF IVC FILTER: ICD-10-CM

## 2024-06-07 DIAGNOSIS — I26.99 OTHER PULMONARY EMBOLISM WITHOUT ACUTE COR PULMONALE, UNSPECIFIED CHRONICITY (HCC): ICD-10-CM

## 2024-06-07 DIAGNOSIS — D50.0 IRON DEFICIENCY ANEMIA DUE TO CHRONIC BLOOD LOSS: Primary | ICD-10-CM

## 2024-06-07 DIAGNOSIS — E83.42 HYPOMAGNESEMIA: ICD-10-CM

## 2024-06-07 DIAGNOSIS — D72.829 LEUKOCYTOSIS, UNSPECIFIED TYPE: ICD-10-CM

## 2024-06-07 DIAGNOSIS — Q27.30 AVM (ARTERIOVENOUS MALFORMATION) (HCC): ICD-10-CM

## 2024-06-07 DIAGNOSIS — E11.65 UNCONTROLLED TYPE 2 DIABETES MELLITUS WITH HYPERGLYCEMIA (HCC): Primary | ICD-10-CM

## 2024-06-07 DIAGNOSIS — Z45.2 ENCOUNTER FOR CARE RELATED TO VASCULAR ACCESS PORT: Primary | ICD-10-CM

## 2024-06-07 DIAGNOSIS — K90.9 MALABSORPTION OF IRON (HCC): ICD-10-CM

## 2024-06-07 LAB
ALBUMIN SERPL-MCNC: 3.7 G/DL (ref 3.2–4.8)
ANION GAP SERPL CALC-SCNC: 7 MMOL/L (ref 0–18)
BASOPHILS # BLD AUTO: 0.05 X10(3) UL (ref 0–0.2)
BASOPHILS NFR BLD AUTO: 0.4 %
BILIRUB UR QL: NEGATIVE
BUN BLD-MCNC: 15 MG/DL (ref 9–23)
BUN/CREAT SERPL: 17.4 (ref 10–20)
CALCIUM BLD-MCNC: 9 MG/DL (ref 8.7–10.4)
CHLORIDE SERPL-SCNC: 105 MMOL/L (ref 98–112)
CO2 SERPL-SCNC: 27 MMOL/L (ref 21–32)
CREAT BLD-MCNC: 0.86 MG/DL
DEPRECATED HBV CORE AB SER IA-ACNC: 244.5 NG/ML
DEPRECATED RDW RBC AUTO: 50.5 FL (ref 35.1–46.3)
EGFRCR SERPLBLD CKD-EPI 2021: 66 ML/MIN/1.73M2 (ref 60–?)
EOSINOPHIL # BLD AUTO: 0.14 X10(3) UL (ref 0–0.7)
EOSINOPHIL NFR BLD AUTO: 1.2 %
ERYTHROCYTE [DISTWIDTH] IN BLOOD BY AUTOMATED COUNT: 14.5 % (ref 11–15)
GLUCOSE BLD-MCNC: 242 MG/DL (ref 70–99)
GLUCOSE UR-MCNC: 30 MG/DL
HCT VFR BLD AUTO: 32 %
HGB BLD-MCNC: 9.9 G/DL
IMM GRANULOCYTES # BLD AUTO: 0.12 X10(3) UL (ref 0–1)
IMM GRANULOCYTES NFR BLD: 1 %
IRON SATN MFR SERPL: 20 %
IRON SERPL-MCNC: 57 UG/DL
KETONES UR-MCNC: NEGATIVE MG/DL
LEUKOCYTE ESTERASE UR QL STRIP.AUTO: 500
LYMPHOCYTES # BLD AUTO: 1.27 X10(3) UL (ref 1–4)
LYMPHOCYTES NFR BLD AUTO: 10.5 %
MAGNESIUM SERPL-MCNC: 1.6 MG/DL (ref 1.6–2.6)
MCH RBC QN AUTO: 29.6 PG (ref 26–34)
MCHC RBC AUTO-ENTMCNC: 30.9 G/DL (ref 31–37)
MCV RBC AUTO: 95.5 FL
MONOCYTES # BLD AUTO: 0.74 X10(3) UL (ref 0.1–1)
MONOCYTES NFR BLD AUTO: 6.1 %
NEUTROPHILS # BLD AUTO: 9.81 X10 (3) UL (ref 1.5–7.7)
NEUTROPHILS # BLD AUTO: 9.81 X10(3) UL (ref 1.5–7.7)
NEUTROPHILS NFR BLD AUTO: 80.8 %
NITRITE UR QL STRIP.AUTO: NEGATIVE
OSMOLALITY SERPL CALC.SUM OF ELEC: 297 MOSM/KG (ref 275–295)
PH UR: 6 [PH] (ref 5–8)
PHOSPHATE SERPL-MCNC: 3.9 MG/DL (ref 2.4–5.1)
PLATELET # BLD AUTO: 258 10(3)UL (ref 150–450)
POTASSIUM SERPL-SCNC: 3.8 MMOL/L (ref 3.5–5.1)
PROT UR-MCNC: 100 MG/DL
RBC # BLD AUTO: 3.35 X10(6)UL
RBC #/AREA URNS AUTO: >10 /HPF
SODIUM SERPL-SCNC: 139 MMOL/L (ref 136–145)
SP GR UR STRIP: 1.01 (ref 1–1.03)
TIBC SERPL-MCNC: 283 UG/DL (ref 250–425)
TRANSFERRIN SERPL-MCNC: 190 MG/DL (ref 250–380)
UROBILINOGEN UR STRIP-ACNC: NORMAL
WBC # BLD AUTO: 12.1 X10(3) UL (ref 4–11)
WBC #/AREA URNS AUTO: >50 /HPF
WBC CLUMPS UR QL AUTO: PRESENT /HPF

## 2024-06-07 PROCEDURE — 36591 DRAW BLOOD OFF VENOUS DEVICE: CPT

## 2024-06-07 PROCEDURE — 80069 RENAL FUNCTION PANEL: CPT

## 2024-06-07 PROCEDURE — 85025 COMPLETE CBC W/AUTO DIFF WBC: CPT

## 2024-06-07 PROCEDURE — 83735 ASSAY OF MAGNESIUM: CPT

## 2024-06-07 PROCEDURE — 81001 URINALYSIS AUTO W/SCOPE: CPT

## 2024-06-07 PROCEDURE — 84466 ASSAY OF TRANSFERRIN: CPT

## 2024-06-07 PROCEDURE — 99214 OFFICE O/P EST MOD 30 MIN: CPT | Performed by: INTERNAL MEDICINE

## 2024-06-07 PROCEDURE — 82728 ASSAY OF FERRITIN: CPT

## 2024-06-07 PROCEDURE — 83540 ASSAY OF IRON: CPT

## 2024-06-07 PROCEDURE — 96372 THER/PROPH/DIAG INJ SC/IM: CPT

## 2024-06-07 RX ORDER — TORSEMIDE 20 MG/1
20 TABLET ORAL DAILY
COMMUNITY
Start: 2024-05-13

## 2024-06-07 RX ORDER — HEPARIN SODIUM (PORCINE) LOCK FLUSH IV SOLN 100 UNIT/ML 100 UNIT/ML
5 SOLUTION INTRAVENOUS ONCE
Status: COMPLETED | OUTPATIENT
Start: 2024-06-07 | End: 2024-06-07

## 2024-06-07 RX ORDER — HEPARIN SODIUM (PORCINE) LOCK FLUSH IV SOLN 100 UNIT/ML 100 UNIT/ML
5 SOLUTION INTRAVENOUS ONCE
OUTPATIENT
Start: 2024-07-05

## 2024-06-07 RX ORDER — SODIUM CHLORIDE 9 MG/ML
10 INJECTION, SOLUTION INTRAMUSCULAR; INTRAVENOUS; SUBCUTANEOUS ONCE
OUTPATIENT
Start: 2024-07-05

## 2024-06-07 RX ORDER — OMEPRAZOLE 20 MG/1
20 CAPSULE, DELAYED RELEASE ORAL EVERY MORNING
Qty: 90 CAPSULE | Refills: 1 | Status: SHIPPED | OUTPATIENT
Start: 2024-06-07

## 2024-06-07 RX ADMIN — HEPARIN SODIUM (PORCINE) LOCK FLUSH IV SOLN 100 UNIT/ML 500 UNITS: 100 SOLUTION INTRAVENOUS at 12:00:00

## 2024-06-07 NOTE — PROGRESS NOTES
Cancer Center Progress Note    Patient Name: Chester Bautista   YOB: 1940   Medical Record Number: [id number]     Chief Complaint:  BUZZ, IV iron, PE      Oncology History:  84 year old with chronic iron deficiency anemia in the setting of bleeding AVMs s/p repeat endoscopic treatment, Iv iron treatment and recent RBC transfusion for hgb of 6 on 5/2022. EGD/CLN Dr Garcia 7/2022 noted for 2 AVM in duodenum and 1 AVM in the colon s/p treatment.  There is ongoing concern for more AVMs as well need for chronic monitoring, infusions etc.      History is noted for RA managed by Dr Goodrich, hepatitis B core antibody positivity, and remote history of breast cancer (records not available).     Pt had an infusion 2/2023 injectafer.     5/19/23: reported to ED due to rectal bleeding and abd pain, has CLN planned.    In early 2/24 rec for ER eval in light of the critically HTN. She was treated for pneumonia. Prudencio was then found to have PE and GI bleeding. She was treated with IV heparin and required cauterization twice for GI bleeding, so never started on Apixaban and held from anticoagulation.    6/7/2024  Interim  Patient presents for f/u of BUZZ s/p receiving some IV iron in 4/24. She is on monthly octreotide by GI for GI bleeding concerns.     Prudencio has symptoms of fatigue  but significantly improved and dyspnea on exertion is stable, but no chest pain. Pt denies any systemic signs of illness.     Patient denies blood loss from any orifice that she can see. Prudencio underwent bidirectional scoping with Dr. Aundrea Garcia in GI in June 2023 were several areas of AVM were cauterized.        Past Medical History: history of breast cancer, BUZZ, bleeding AVM, diabetes, HTN, HLD, RA  Past Surgical History: Hysterectomy 1972, IR aneurysm repair, right lumpectomy, knee replacements  Family History: neg for malignancy  Social History: lives with naif, able to do all ADLs, no smoking or ETOH    Current Outpatient  Medications:     torsemide 20 MG Oral Tab, Take 1 tablet (20 mg total) by mouth daily., Disp: , Rfl:     Insulin Aspart Prot & Aspart (INSULIN ASP PROT & ASP FLEXPEN) (70-30) 100 UNIT/ML Subcutaneous Suspension Pen-injector, Inject 12 Units into the skin daily with lunch., Disp: 9 mL, Rfl: 0    Insulin Pen Needle 33G X 4 MM Does not apply Misc, 1 each daily. Use daily with insulin, Disp: 100 each, Rfl: 0    Alcohol Swabs (ALCOHOL PREP) Does not apply Pads, 1 each daily., Disp: 100 each, Rfl: 0    glipiZIDE 5 MG Oral Tab, Take 2 tablets (10 mg total) by mouth before breakfast., Disp: 180 tablet, Rfl: 0    valsartan 80 MG Oral Tab, Take 1 tablet (80 mg total) by mouth daily., Disp: 90 tablet, Rfl: 3    cyclobenzaprine 10 MG Oral Tab, Take 1 tablet (10 mg total) by mouth nightly as needed., Disp: 90 tablet, Rfl: 1    tamsulosin 0.4 MG Oral Cap, Take 1 capsule (0.4 mg total) by mouth daily., Disp: 90 capsule, Rfl: 1    carvedilol 12.5 MG Oral Tab, Take 1 tablet (12.5 mg total) by mouth 2 (two) times daily with meals., Disp: 180 tablet, Rfl: 3    predniSONE 5 MG Oral Tab, Take 1 tablet (5 mg total) by mouth daily., Disp: 90 tablet, Rfl: 1    fluticasone-salmeterol (WIXELA INHUB) 250-50 MCG/ACT Inhalation Aerosol Powder, Breath Activated, Inhale 1 puff into the lungs every 12 (twelve) hours., Disp: 1 each, Rfl: 5    Lancets Does not apply Misc, Dx. E11.9. Checks qam., Disp: 50 each, Rfl: 6    Blood Glucose Monitoring Suppl (BLOOD GLUCOSE SYSTEM GILLES) Does not apply Kit, Dx. E11.9. Checks qam., Disp: 1 kit, Rfl: 0    Blood Glucose Monitoring Suppl w/Device Does not apply Kit, Dx. E11.9. Checks qam., Disp: 1 kit, Rfl: 0    Blood Gluc Meter Disp-Strips Does not apply Device, Dx. E11.9. Checks qam., Disp: 100 each, Rfl: 0    gabapentin 100 MG Oral Cap, Take 1 capsule (100 mg total) by mouth nightly., Disp: 90 capsule, Rfl: 3    midodrine 2.5 MG Oral Tab, Take 1 tablet (2.5 mg total) by mouth in the morning and 1 tablet (2.5 mg  total) at noon and 1 tablet (2.5 mg total) in the evening. Hold if BP > 120/80.., Disp: 90 tablet, Rfl: 0    LORATADINE 10 MG Oral Tab, TAKE 1 TABLET BY MOUTH EVERY DAY, Disp: 90 tablet, Rfl: 1    Accu-Chek FastClix Lancets Does not apply Misc, Check sugar once daily as directed (E11.42), Disp: 100 each, Rfl: 2    hydroxychloroquine 200 MG Oral Tab, Take 1 tablet (200 mg total) by mouth daily., Disp: 90 tablet, Rfl: 1    Glucose Blood (ACCU-CHEK GUIDE) In Vitro Strip, Check blood sugar twice a day (fasting and before dinner). DX: E11.42, NIDDM, Disp: 200 strip, Rfl: 1    rosuvastatin (CRESTOR) 20 MG Oral Tab, Take 1 tablet (20 mg total) by mouth nightly., Disp: 90 tablet, Rfl: 0    glycopyrrolate 1 MG Oral Tab, Take 1 tablet (1 mg total) by mouth 3 (three) times daily., Disp: 90 tablet, Rfl: 1    albuterol 108 (90 Base) MCG/ACT Inhalation Aero Soln, inhale 2 puff by inhalation route  every 4 - 6 hours as needed, Disp: 1 each, Rfl: 0    montelukast 10 MG Oral Tab, Take 1 tablet (10 mg total) by mouth nightly., Disp: 90 tablet, Rfl: 3    albuterol 108 (90 Base) MCG/ACT Inhalation Aero Soln, inhale 2 puff by inhalation route  every 4 - 6 hours as needed, Disp: 1 each, Rfl: 5    Blood Glucose Monitoring Suppl (ONETOUCH VERIO) w/Device Does not apply Kit, 1 each daily. Test 1x daily, Disp: 1 kit, Rfl: 0    Glucose Blood (ONETOUCH VERIO) In Vitro Strip, Test 1x daily, Disp: 100 strip, Rfl: 1    OneTouch Delica Lancets 33G Does not apply Misc, 4 x daily, Disp: 100 each, Rfl: 1    Blood Glucose Monitoring Suppl (BLOOD GLUCOSE SYSTEM GILLES) Does not apply Kit, DX E 11.9.  Checks every morning., Disp: 1 kit, Rfl: 0    Lancets Does not apply Misc, DX E 11.9.  Checks every morning., Disp: 50 each, Rfl: 11    Blood Glucose Monitoring Suppl w/Device Does not apply Kit, DX E 11.9.  Checks every morning., Disp: 1 kit, Rfl: 0    Blood Gluc Meter Disp-Strips Does not apply Device, DX E 11.9.  Checks every morning., Disp: 100 each, Rfl:  11    acetaminophen 500 MG Oral Tab, Take 1 tablet (500 mg total) by mouth daily., Disp: , Rfl:     omeprazole 20 MG Oral Capsule Delayed Release, Take 1 capsule (20 mg total) by mouth every morning. (Patient not taking: Reported on 6/7/2024), Disp: 90 capsule, Rfl: 1    Allergies:  Allergies   Allergen Reactions    Celebrex [Celecoxib] PALPITATIONS    Penicillins ITCHING and SWELLING    Metformin And Related UNKNOWN    Olmesartan UNKNOWN        Review of Systems:Oncology specific ROS negative except as per HPI    BP (!) 167/78 (BP Location: Left arm, Patient Position: Sitting, Cuff Size: adult)   Pulse 81   Temp 97.9 °F (36.6 °C) (Oral)   Resp 16   Ht 1.549 m (5' 1\")   Wt 64.7 kg (142 lb 9.6 oz)   SpO2 94%   BMI 26.94 kg/m²   Wt Readings from Last 6 Encounters:   06/07/24 64.7 kg (142 lb 9.6 oz)   05/21/24 64.9 kg (143 lb)   05/13/24 64.9 kg (143 lb)   04/11/24 62.6 kg (138 lb)   04/05/24 63 kg (139 lb)   03/25/24 62.1 kg (137 lb)   General: Patient is alert and oriented x 3, not in acute distress.  HEENT: EOMI, MMM  Chest: Clear to auscultation bilaterally.  Abdomen: soft, non tender, non distended  Extremities: no signs of LE edema  Neurological: Strength is grossly intact. Moving all extremities. Gait appropriate.   Psych/Depression: Appropriate mood and affect          Lab Results   Component Value Date    WBC 12.1 (H) 06/07/2024    RBC 3.35 (L) 06/07/2024    HGB 9.9 (L) 06/07/2024    HCT 32.0 (L) 06/07/2024    MCV 95.5 06/07/2024    MCH 29.6 06/07/2024    MCHC 30.9 (L) 06/07/2024    RDW 14.5 06/07/2024    .0 06/07/2024     Lab Results   Component Value Date     (H) 03/25/2024    BUN 13 03/25/2024    BUNCREA 14.8 03/25/2024    CREATSERUM 0.88 03/25/2024    ANIONGAP 5 03/25/2024    GFRNAA 47 (L) 05/02/2022    GFRAA 54 (L) 05/02/2022    CA 8.7 03/25/2024    OSMOCALC 302 (H) 03/25/2024    ALKPHO 89 03/25/2024    AST 23 03/25/2024    ALT 28 03/25/2024    BILT 0.2 03/25/2024    TP 6.8 03/25/2024     ALB 3.5 03/25/2024    GLOBULIN 3.3 03/25/2024     03/25/2024    K 3.5 03/25/2024     03/25/2024    CO2 26.0 03/25/2024        Latest Reference Range & Units 06/07/24 11:47   Iron, Serum 50 - 170 ug/dL 57   Transferrin 250 - 380 mg/dL 190 (L)   Iron Bind.Cap.(TIBC) 250 - 425 ug/dL 283   Iron Saturation 15 - 50 % 20   FERRITIN 18.0 - 340.0 ng/mL 244.5   (L): Data is abnormally low    Impression and Plan:  84 year old with chronic iron deficiency anemia in the setting of bleeding AVMs s/p repeat endoscopic treatment, Iv iron treatment and rbc tfx 5/2022    1.) BUZZ from AVM's    - previously CAPUTO has been a hallmark for symptomatic anemia for her, also has worsening fatigue and some lightheadedness    --pt received venofer in 4/24 and currently feels improve    --current iron studies show ferritin is great at 244; hgb up to near 10; f//u in 8 weeks      --pt has improved symptoms with monthly octreotide by Dr. Garcia in GI for continued blood loss; continue care and management      3.) Leukocytosis with Neutrophilia  --likely from inflammation in light of recent GI bleeding and PE; suspect her pna has resolved    4.) HTN  --remains elevated; improved    5.) Acute PE  --found on 2/23/24; not on anticoagulation in light of recent GI bleeding   --IVC filter placed on 3/7/24 by IR; she was rec for IVC filter retrial in 3-6 mo by IR  --her PE was noted to be resolved by CT chest on 4/26    MDM: Moderaate Risk; ongoing likely GI bleeding from AVM's, HTN, acute PE, IVC filter placement    Eric Mohamud MD  [facility]  [place] [address] [facility], [place] [address]

## 2024-06-07 NOTE — PROGRESS NOTES
Chester Bautista  : 1940 was seen for Injection Instruction:    Date: 2024 Start time: 1:15 pm  End time: 1:45 pm     There were no vitals taken for this visit.    Glucose today: in the 200's per patient's report      Medication type, dose and frequency: mix insulin 70/30  Hand out on tips for mixed insulin provided.        Pen Needle Size:Tata 4mm 32 guage    Instructed on operation of device and patient was able to do correct return demonstration.  Instructed on site selection and rotation of injections.  Instructed on proper disposal of sharps.  Reviewed proper storage of medication.  Reviewed blood glucose testing.  Reviewed hypoglycemia and the Rule of 15.  Patient set goals for glucose management.    Comments / Follow-up Recommendations: Patient interested in wearing a CGM . Patient reports CGM covered at this time.    Nelsy Oliva RN

## 2024-06-07 NOTE — H&P (VIEW-ONLY)
Cancer Center Progress Note    Patient Name: Chester Bautista   YOB: 1940   Medical Record Number: [id number]     Chief Complaint:  BUZZ, IV iron, PE      Oncology History:  84 year old with chronic iron deficiency anemia in the setting of bleeding AVMs s/p repeat endoscopic treatment, Iv iron treatment and recent RBC transfusion for hgb of 6 on 5/2022. EGD/CLN Dr Garcia 7/2022 noted for 2 AVM in duodenum and 1 AVM in the colon s/p treatment.  There is ongoing concern for more AVMs as well need for chronic monitoring, infusions etc.      History is noted for RA managed by Dr Goodrich, hepatitis B core antibody positivity, and remote history of breast cancer (records not available).     Pt had an infusion 2/2023 injectafer.     5/19/23: reported to ED due to rectal bleeding and abd pain, has CLN planned.    In early 2/24 rec for ER eval in light of the critically HTN. She was treated for pneumonia. Prudencio was then found to have PE and GI bleeding. She was treated with IV heparin and required cauterization twice for GI bleeding, so never started on Apixaban and held from anticoagulation.    6/7/2024  Interim  Patient presents for f/u of BUZZ s/p receiving some IV iron in 4/24. She is on monthly octreotide by GI for GI bleeding concerns.     Prudencio has symptoms of fatigue  but significantly improved and dyspnea on exertion is stable, but no chest pain. Pt denies any systemic signs of illness.     Patient denies blood loss from any orifice that she can see. Prudencio underwent bidirectional scoping with Dr. Aundrea Garcia in GI in June 2023 were several areas of AVM were cauterized.        Past Medical History: history of breast cancer, BUZZ, bleeding AVM, diabetes, HTN, HLD, RA  Past Surgical History: Hysterectomy 1972, IR aneurysm repair, right lumpectomy, knee replacements  Family History: neg for malignancy  Social History: lives with naif, able to do all ADLs, no smoking or ETOH    Current Outpatient  Medications:     torsemide 20 MG Oral Tab, Take 1 tablet (20 mg total) by mouth daily., Disp: , Rfl:     Insulin Aspart Prot & Aspart (INSULIN ASP PROT & ASP FLEXPEN) (70-30) 100 UNIT/ML Subcutaneous Suspension Pen-injector, Inject 12 Units into the skin daily with lunch., Disp: 9 mL, Rfl: 0    Insulin Pen Needle 33G X 4 MM Does not apply Misc, 1 each daily. Use daily with insulin, Disp: 100 each, Rfl: 0    Alcohol Swabs (ALCOHOL PREP) Does not apply Pads, 1 each daily., Disp: 100 each, Rfl: 0    glipiZIDE 5 MG Oral Tab, Take 2 tablets (10 mg total) by mouth before breakfast., Disp: 180 tablet, Rfl: 0    valsartan 80 MG Oral Tab, Take 1 tablet (80 mg total) by mouth daily., Disp: 90 tablet, Rfl: 3    cyclobenzaprine 10 MG Oral Tab, Take 1 tablet (10 mg total) by mouth nightly as needed., Disp: 90 tablet, Rfl: 1    tamsulosin 0.4 MG Oral Cap, Take 1 capsule (0.4 mg total) by mouth daily., Disp: 90 capsule, Rfl: 1    carvedilol 12.5 MG Oral Tab, Take 1 tablet (12.5 mg total) by mouth 2 (two) times daily with meals., Disp: 180 tablet, Rfl: 3    predniSONE 5 MG Oral Tab, Take 1 tablet (5 mg total) by mouth daily., Disp: 90 tablet, Rfl: 1    fluticasone-salmeterol (WIXELA INHUB) 250-50 MCG/ACT Inhalation Aerosol Powder, Breath Activated, Inhale 1 puff into the lungs every 12 (twelve) hours., Disp: 1 each, Rfl: 5    Lancets Does not apply Misc, Dx. E11.9. Checks qam., Disp: 50 each, Rfl: 6    Blood Glucose Monitoring Suppl (BLOOD GLUCOSE SYSTEM GILLES) Does not apply Kit, Dx. E11.9. Checks qam., Disp: 1 kit, Rfl: 0    Blood Glucose Monitoring Suppl w/Device Does not apply Kit, Dx. E11.9. Checks qam., Disp: 1 kit, Rfl: 0    Blood Gluc Meter Disp-Strips Does not apply Device, Dx. E11.9. Checks qam., Disp: 100 each, Rfl: 0    gabapentin 100 MG Oral Cap, Take 1 capsule (100 mg total) by mouth nightly., Disp: 90 capsule, Rfl: 3    midodrine 2.5 MG Oral Tab, Take 1 tablet (2.5 mg total) by mouth in the morning and 1 tablet (2.5 mg  total) at noon and 1 tablet (2.5 mg total) in the evening. Hold if BP > 120/80.., Disp: 90 tablet, Rfl: 0    LORATADINE 10 MG Oral Tab, TAKE 1 TABLET BY MOUTH EVERY DAY, Disp: 90 tablet, Rfl: 1    Accu-Chek FastClix Lancets Does not apply Misc, Check sugar once daily as directed (E11.42), Disp: 100 each, Rfl: 2    hydroxychloroquine 200 MG Oral Tab, Take 1 tablet (200 mg total) by mouth daily., Disp: 90 tablet, Rfl: 1    Glucose Blood (ACCU-CHEK GUIDE) In Vitro Strip, Check blood sugar twice a day (fasting and before dinner). DX: E11.42, NIDDM, Disp: 200 strip, Rfl: 1    rosuvastatin (CRESTOR) 20 MG Oral Tab, Take 1 tablet (20 mg total) by mouth nightly., Disp: 90 tablet, Rfl: 0    glycopyrrolate 1 MG Oral Tab, Take 1 tablet (1 mg total) by mouth 3 (three) times daily., Disp: 90 tablet, Rfl: 1    albuterol 108 (90 Base) MCG/ACT Inhalation Aero Soln, inhale 2 puff by inhalation route  every 4 - 6 hours as needed, Disp: 1 each, Rfl: 0    montelukast 10 MG Oral Tab, Take 1 tablet (10 mg total) by mouth nightly., Disp: 90 tablet, Rfl: 3    albuterol 108 (90 Base) MCG/ACT Inhalation Aero Soln, inhale 2 puff by inhalation route  every 4 - 6 hours as needed, Disp: 1 each, Rfl: 5    Blood Glucose Monitoring Suppl (ONETOUCH VERIO) w/Device Does not apply Kit, 1 each daily. Test 1x daily, Disp: 1 kit, Rfl: 0    Glucose Blood (ONETOUCH VERIO) In Vitro Strip, Test 1x daily, Disp: 100 strip, Rfl: 1    OneTouch Delica Lancets 33G Does not apply Misc, 4 x daily, Disp: 100 each, Rfl: 1    Blood Glucose Monitoring Suppl (BLOOD GLUCOSE SYSTEM GILLES) Does not apply Kit, DX E 11.9.  Checks every morning., Disp: 1 kit, Rfl: 0    Lancets Does not apply Misc, DX E 11.9.  Checks every morning., Disp: 50 each, Rfl: 11    Blood Glucose Monitoring Suppl w/Device Does not apply Kit, DX E 11.9.  Checks every morning., Disp: 1 kit, Rfl: 0    Blood Gluc Meter Disp-Strips Does not apply Device, DX E 11.9.  Checks every morning., Disp: 100 each, Rfl:  11    acetaminophen 500 MG Oral Tab, Take 1 tablet (500 mg total) by mouth daily., Disp: , Rfl:     omeprazole 20 MG Oral Capsule Delayed Release, Take 1 capsule (20 mg total) by mouth every morning. (Patient not taking: Reported on 6/7/2024), Disp: 90 capsule, Rfl: 1    Allergies:  Allergies   Allergen Reactions    Celebrex [Celecoxib] PALPITATIONS    Penicillins ITCHING and SWELLING    Metformin And Related UNKNOWN    Olmesartan UNKNOWN        Review of Systems:Oncology specific ROS negative except as per HPI    BP (!) 167/78 (BP Location: Left arm, Patient Position: Sitting, Cuff Size: adult)   Pulse 81   Temp 97.9 °F (36.6 °C) (Oral)   Resp 16   Ht 1.549 m (5' 1\")   Wt 64.7 kg (142 lb 9.6 oz)   SpO2 94%   BMI 26.94 kg/m²   Wt Readings from Last 6 Encounters:   06/07/24 64.7 kg (142 lb 9.6 oz)   05/21/24 64.9 kg (143 lb)   05/13/24 64.9 kg (143 lb)   04/11/24 62.6 kg (138 lb)   04/05/24 63 kg (139 lb)   03/25/24 62.1 kg (137 lb)   General: Patient is alert and oriented x 3, not in acute distress.  HEENT: EOMI, MMM  Chest: Clear to auscultation bilaterally.  Abdomen: soft, non tender, non distended  Extremities: no signs of LE edema  Neurological: Strength is grossly intact. Moving all extremities. Gait appropriate.   Psych/Depression: Appropriate mood and affect          Lab Results   Component Value Date    WBC 12.1 (H) 06/07/2024    RBC 3.35 (L) 06/07/2024    HGB 9.9 (L) 06/07/2024    HCT 32.0 (L) 06/07/2024    MCV 95.5 06/07/2024    MCH 29.6 06/07/2024    MCHC 30.9 (L) 06/07/2024    RDW 14.5 06/07/2024    .0 06/07/2024     Lab Results   Component Value Date     (H) 03/25/2024    BUN 13 03/25/2024    BUNCREA 14.8 03/25/2024    CREATSERUM 0.88 03/25/2024    ANIONGAP 5 03/25/2024    GFRNAA 47 (L) 05/02/2022    GFRAA 54 (L) 05/02/2022    CA 8.7 03/25/2024    OSMOCALC 302 (H) 03/25/2024    ALKPHO 89 03/25/2024    AST 23 03/25/2024    ALT 28 03/25/2024    BILT 0.2 03/25/2024    TP 6.8 03/25/2024     ALB 3.5 03/25/2024    GLOBULIN 3.3 03/25/2024     03/25/2024    K 3.5 03/25/2024     03/25/2024    CO2 26.0 03/25/2024        Latest Reference Range & Units 06/07/24 11:47   Iron, Serum 50 - 170 ug/dL 57   Transferrin 250 - 380 mg/dL 190 (L)   Iron Bind.Cap.(TIBC) 250 - 425 ug/dL 283   Iron Saturation 15 - 50 % 20   FERRITIN 18.0 - 340.0 ng/mL 244.5   (L): Data is abnormally low    Impression and Plan:  84 year old with chronic iron deficiency anemia in the setting of bleeding AVMs s/p repeat endoscopic treatment, Iv iron treatment and rbc tfx 5/2022    1.) BUZZ from AVM's    - previously CAPUTO has been a hallmark for symptomatic anemia for her, also has worsening fatigue and some lightheadedness    --pt received venofer in 4/24 and currently feels improve    --current iron studies show ferritin is great at 244; hgb up to near 10; f//u in 8 weeks      --pt has improved symptoms with monthly octreotide by Dr. Garcia in GI for continued blood loss; continue care and management      3.) Leukocytosis with Neutrophilia  --likely from inflammation in light of recent GI bleeding and PE; suspect her pna has resolved    4.) HTN  --remains elevated; improved    5.) Acute PE  --found on 2/23/24; not on anticoagulation in light of recent GI bleeding   --IVC filter placed on 3/7/24 by IR; she was rec for IVC filter retrial in 3-6 mo by IR  --her PE was noted to be resolved by CT chest on 4/26    MDM: Moderaate Risk; ongoing likely GI bleeding from AVM's, HTN, acute PE, IVC filter placement    Eric Mohamud MD  [facility]  [place] [address] [address] [facility], [place] [address]

## 2024-06-07 NOTE — TELEPHONE ENCOUNTER
Requested Prescriptions     Pending Prescriptions Disp Refills    OMEPRAZOLE 20 MG Oral Capsule Delayed Release [Pharmacy Med Name: OMEPRAZOLE DR 20 MG CAPSULE] 90 capsule 1     Sig: TAKE 1 CAPSULE BY MOUTH EVERY DAY IN THE MORNING         LOV   9/20/2023      LR    11/17/2023    AR

## 2024-06-07 NOTE — TELEPHONE ENCOUNTER
Per [facility], they have appointment today at 1pm. Spoke with patient's daughter and they will come in for insulin pen teaching. She had Joselyn sample, but it fell off. She is connected to the clinic.

## 2024-06-10 ENCOUNTER — TELEPHONE (OUTPATIENT)
Dept: ENDOCRINOLOGY CLINIC | Facility: CLINIC | Age: 84
End: 2024-06-10

## 2024-06-10 NOTE — TELEPHONE ENCOUNTER
Patient interested in getting started with Freestyle van 3 cgm (sensor and ).     Please start paperwork with DME. Patient now using 1 insulin injection per day, thus qualifies.         Thank you!

## 2024-06-11 ENCOUNTER — TELEPHONE (OUTPATIENT)
Dept: INTERNAL MEDICINE CLINIC | Facility: CLINIC | Age: 84
End: 2024-06-11

## 2024-06-11 ENCOUNTER — TELEPHONE (OUTPATIENT)
Facility: CLINIC | Age: 84
End: 2024-06-11

## 2024-06-11 ENCOUNTER — HOSPITAL ENCOUNTER (OUTPATIENT)
Dept: ULTRASOUND IMAGING | Age: 84
Discharge: HOME | End: 2024-06-11
Attending: INTERNAL MEDICINE
Payer: MEDICARE

## 2024-06-11 DIAGNOSIS — E83.42 HYPOMAGNESEMIA: Primary | ICD-10-CM

## 2024-06-11 DIAGNOSIS — R82.90 ABNORMAL URINALYSIS: Primary | ICD-10-CM

## 2024-06-11 DIAGNOSIS — E04.1 THYROID NODULE: ICD-10-CM

## 2024-06-11 PROCEDURE — 76536 US EXAM OF HEAD AND NECK: CPT | Performed by: INTERNAL MEDICINE

## 2024-06-11 NOTE — TELEPHONE ENCOUNTER
Patient and daughter Brea notified of below.   Patient did drink water prior to last testing. She does state she feels she drinks a good amount of water but when she does not she does itch.     Agreeable to retest, once retest specimen submitted they will call for RX to be sent to pharmacy.     FYI to PCP

## 2024-06-11 NOTE — TELEPHONE ENCOUNTER
Dr Encinas informed patient and patient daughter Brea of note below ,verbalized understanding reported patient is having pain when urinate and itchiness will call PCP as advised.for further treatment For follow you are booked in June ,Patient daughter is requesting a video visit cannot drive pt , will have surgery first week of July.

## 2024-06-11 NOTE — TELEPHONE ENCOUNTER
Call from daughter Brea, patient is with her, verified name/.  Nephrologist ran UA from 2024 which came back with leukocytes, blood, and bacteria.  Their office instructed patient to call PCP for follow up orders.  Patient has been having some burning with urination and itching for 1-2 weeks.  Urine is cloudy.  Has some low back pain.    Dr Sorto, please advise, do you want to treat  or re run UA/culture?  Allergic to penicillin.

## 2024-06-11 NOTE — TELEPHONE ENCOUNTER
The urine is cloudy.  She is having some symptoms.  But the thing is I am not sure if it is cloudy because she is dehydrated or and how clean the specimen was.  I would recommend repeating clean-catch early morning specimen with at least 8 ounces of water prior to retesting.  When she submits the urine I can give her a short course of antibiotics until the urine culture comes back.  Hydrate at least 48 ounces of water a day.  Wipe from front to back or wipe front and back separately.  Try not to get stool into the urine itself

## 2024-06-11 NOTE — TELEPHONE ENCOUNTER
Please call and let them know continue mag three times a day   Repeat mag is normal but borderline  We will repeat in 2 weeks and please also make a fu appt with me    Also  UA dirty any UTI symptoms , if yes need to let her pcp know    thanks

## 2024-06-11 NOTE — TELEPHONE ENCOUNTER
Calling to rescheduled CLN
Dr Saw Hernandez   Patient has been rescheduled for her colonoscopy and EGD procedure   Patient  Is requesting for new bowel prep to be sent to her [facility] pharmacy on file   Patient accidentally opened last one prior to cancellation of case    Thank you     CHEYENNE Benton [phone], [phone]   [address]   Phone: [phone] Fax: [phone]   Hours: Not open 24 hours
I spoke to Briana Cunningham (ok per [facility])  She is aware the infusions will not impact the colonoscopy-we just have patients hold oral iron before procedures, not the infusions. She plans on calling to speak to our schedulers later today to schedule the procedure for [place].
Prep/Golytely sent to pharmacy
Spoke to patients daughter case has been rescheduled and added onto epic and endo revised prep instructions to be sent to patient via my chart       REscheduled for:  Colonoscopy [id number] and [id number]  Provider Name:  Dr Otilia Rowland   Date:from 06/2022                          To  07/21/2022    Location:   [facility]   Sedation:  MAC  Time: 0145 (pt is aware to arrive at 0815)     Prep:  golytely   Meds/Allergies Reconciled?:  Physician reviewed   Diagnosis with codes:  Anemia D64.9  Was patient informed to call insurance with codes (Y/N):  Yes, I confirmed [employer] insurance with the patient. Referral sent?:  N/A  EM or [facility] notified?:  I sent an electronic request to Endo Scheduling and received a confirmation today. Medication Orders: This patient verbally confirmed that she  is  taking:   Iron, blood thinners, BP meds, and is not diabetic   Not latex allergy, Not PCN allergy and does not have a pacemaker     Misc Orders:  Patient is aware she will be scheduled for a covid 19 test prior to procedure        Further instructions given by staff:     This patient had no questions regarding the prep instructions
Thomas Perez calling to reschedule patient's procedure. Please call.   Thank you
pts daugh states that the pt is sched to get Iron infusion today, so she wants to know if that will affect the pt from getting CLN Proc done, one it is sched?
No

## 2024-06-12 ENCOUNTER — TELEPHONE (OUTPATIENT)
Dept: PULMONOLOGY | Facility: CLINIC | Age: 84
End: 2024-06-12

## 2024-06-12 RX ORDER — PEN NEEDLE, DIABETIC 32GX 5/32"
NEEDLE, DISPOSABLE MISCELLANEOUS
COMMUNITY
Start: 2024-05-31

## 2024-06-12 NOTE — TELEPHONE ENCOUNTER
----- Message from Chapin Parker sent at 6/12/2024  4:18 PM CDT -----  RN, please let the patient know that the thyroid ultrasound shows cystic goiter but no worrisome lesions.  Good news.  Kenneth CAMARENA

## 2024-06-12 NOTE — TELEPHONE ENCOUNTER
Results relayed to patient/daughter.    Dr. Parker-daughter is asking if another thyroid ultrasound will be needed down the line.

## 2024-06-12 NOTE — TELEPHONE ENCOUNTER
Okay to repeat at the 1 year interval although this may be overkill.  The findings appear benign.

## 2024-06-13 ENCOUNTER — LAB ENCOUNTER (OUTPATIENT)
Dept: LAB | Facility: HOSPITAL | Age: 84
End: 2024-06-13
Attending: INTERNAL MEDICINE
Payer: MEDICARE

## 2024-06-13 DIAGNOSIS — R82.90 ABNORMAL URINALYSIS: ICD-10-CM

## 2024-06-13 LAB
BILIRUB UR QL: NEGATIVE
COLOR UR: YELLOW
GLUCOSE UR-MCNC: 500 MG/DL
KETONES UR-MCNC: NEGATIVE MG/DL
LEUKOCYTE ESTERASE UR QL STRIP.AUTO: 500
NITRITE UR QL STRIP.AUTO: NEGATIVE
PH UR: 5.5 [PH] (ref 5–8)
PROT UR-MCNC: 100 MG/DL
RBC #/AREA URNS AUTO: >10 /HPF
SP GR UR STRIP: 1.01 (ref 1–1.03)
UROBILINOGEN UR STRIP-ACNC: NORMAL
WBC #/AREA URNS AUTO: >50 /HPF
WBC CLUMPS UR QL AUTO: PRESENT /HPF

## 2024-06-13 PROCEDURE — 87184 SC STD DISK METHOD PER PLATE: CPT

## 2024-06-13 PROCEDURE — 87077 CULTURE AEROBIC IDENTIFY: CPT

## 2024-06-13 PROCEDURE — 81001 URINALYSIS AUTO W/SCOPE: CPT

## 2024-06-13 PROCEDURE — 87086 URINE CULTURE/COLONY COUNT: CPT

## 2024-06-13 PROCEDURE — 87186 SC STD MICRODIL/AGAR DIL: CPT

## 2024-06-18 RX ORDER — GLIPIZIDE 5 MG/1
TABLET ORAL
Qty: 225 TABLET | Refills: 0 | OUTPATIENT
Start: 2024-06-18

## 2024-06-18 NOTE — TELEPHONE ENCOUNTER
Glipizide 5mg: 3 month supply sent from Dr. Herman Thakkar office on 05/21/2024 to [facility].

## 2024-06-20 ENCOUNTER — HOSPITAL ENCOUNTER (OUTPATIENT)
Dept: INTERVENTIONAL RADIOLOGY/VASCULAR | Facility: HOSPITAL | Age: 84
Discharge: HOME | End: 2024-06-20
Attending: CLINICAL NURSE SPECIALIST | Admitting: RADIOLOGY
Payer: MEDICARE

## 2024-06-20 ENCOUNTER — NURSE ONLY (OUTPATIENT)
Dept: HEMATOLOGY/ONCOLOGY | Facility: HOSPITAL | Age: 84
End: 2024-06-20
Attending: INTERNAL MEDICINE
Payer: MEDICARE

## 2024-06-20 VITALS
HEIGHT: 61 IN | TEMPERATURE: 97 F | HEART RATE: 78 BPM | OXYGEN SATURATION: 90 % | RESPIRATION RATE: 18 BRPM | WEIGHT: 142 LBS | SYSTOLIC BLOOD PRESSURE: 141 MMHG | DIASTOLIC BLOOD PRESSURE: 79 MMHG | BODY MASS INDEX: 26.81 KG/M2

## 2024-06-20 DIAGNOSIS — Z01.818 PRE-OP TESTING: ICD-10-CM

## 2024-06-20 DIAGNOSIS — Z01.818 PRE-OP TESTING: Primary | ICD-10-CM

## 2024-06-20 DIAGNOSIS — I26.99 PULMONARY EMBOLISM (HCC): ICD-10-CM

## 2024-06-20 LAB
ALBUMIN SERPL-MCNC: 3.6 G/DL (ref 3.2–4.8)
ALBUMIN/GLOB SERPL: 1 {RATIO} (ref 1–2)
ALP LIVER SERPL-CCNC: 92 U/L
ALT SERPL-CCNC: 9 U/L
ANION GAP SERPL CALC-SCNC: 7 MMOL/L (ref 0–18)
AST SERPL-CCNC: 16 U/L (ref ?–34)
BASOPHILS # BLD AUTO: 0.03 X10(3) UL (ref 0–0.2)
BASOPHILS NFR BLD AUTO: 0.4 %
BILIRUB SERPL-MCNC: 0.2 MG/DL (ref 0.2–1.1)
BUN BLD-MCNC: 12 MG/DL (ref 9–23)
BUN/CREAT SERPL: 10.4 (ref 10–20)
CALCIUM BLD-MCNC: 9 MG/DL (ref 8.7–10.4)
CHLORIDE SERPL-SCNC: 102 MMOL/L (ref 98–112)
CO2 SERPL-SCNC: 27 MMOL/L (ref 21–32)
CREAT BLD-MCNC: 1.15 MG/DL
DEPRECATED RDW RBC AUTO: 47.2 FL (ref 35.1–46.3)
EGFRCR SERPLBLD CKD-EPI 2021: 47 ML/MIN/1.73M2 (ref 60–?)
EOSINOPHIL # BLD AUTO: 0.16 X10(3) UL (ref 0–0.7)
EOSINOPHIL NFR BLD AUTO: 1.9 %
ERYTHROCYTE [DISTWIDTH] IN BLOOD BY AUTOMATED COUNT: 14.1 % (ref 11–15)
GLOBULIN PLAS-MCNC: 3.5 G/DL (ref 2–3.5)
GLUCOSE BLD-MCNC: 426 MG/DL (ref 70–99)
GLUCOSE BLDC GLUCOMTR-MCNC: 402 MG/DL (ref 70–99)
HCT VFR BLD AUTO: 32.5 %
HGB BLD-MCNC: 10 G/DL
IMM GRANULOCYTES # BLD AUTO: 0.05 X10(3) UL (ref 0–1)
IMM GRANULOCYTES NFR BLD: 0.6 %
LYMPHOCYTES # BLD AUTO: 1.27 X10(3) UL (ref 1–4)
LYMPHOCYTES NFR BLD AUTO: 15.3 %
MCH RBC QN AUTO: 28.5 PG (ref 26–34)
MCHC RBC AUTO-ENTMCNC: 30.8 G/DL (ref 31–37)
MCV RBC AUTO: 92.6 FL
MONOCYTES # BLD AUTO: 0.85 X10(3) UL (ref 0.1–1)
MONOCYTES NFR BLD AUTO: 10.2 %
NEUTROPHILS # BLD AUTO: 5.96 X10 (3) UL (ref 1.5–7.7)
NEUTROPHILS # BLD AUTO: 5.96 X10(3) UL (ref 1.5–7.7)
NEUTROPHILS NFR BLD AUTO: 71.6 %
OSMOLALITY SERPL CALC.SUM OF ELEC: 300 MOSM/KG (ref 275–295)
PLATELET # BLD AUTO: 302 10(3)UL (ref 150–450)
POTASSIUM SERPL-SCNC: 3.4 MMOL/L (ref 3.5–5.1)
PROT SERPL-MCNC: 7.1 G/DL (ref 5.7–8.2)
RBC # BLD AUTO: 3.51 X10(6)UL
SODIUM SERPL-SCNC: 136 MMOL/L (ref 136–145)
WBC # BLD AUTO: 8.3 X10(3) UL (ref 4–11)

## 2024-06-20 PROCEDURE — 82962 GLUCOSE BLOOD TEST: CPT

## 2024-06-20 PROCEDURE — 99152 MOD SED SAME PHYS/QHP 5/>YRS: CPT | Performed by: RADIOLOGY

## 2024-06-20 PROCEDURE — 80053 COMPREHEN METABOLIC PANEL: CPT

## 2024-06-20 PROCEDURE — 36591 DRAW BLOOD OFF VENOUS DEVICE: CPT

## 2024-06-20 PROCEDURE — 85025 COMPLETE CBC W/AUTO DIFF WBC: CPT

## 2024-06-20 PROCEDURE — 37193 REM ENDOVAS VENA CAVA FILTER: CPT | Performed by: RADIOLOGY

## 2024-06-20 RX ORDER — HEPARIN SODIUM (PORCINE) LOCK FLUSH IV SOLN 100 UNIT/ML 100 UNIT/ML
SOLUTION INTRAVENOUS
Status: DISCONTINUED
Start: 2024-06-20 | End: 2024-06-20

## 2024-06-20 RX ORDER — MIDAZOLAM HYDROCHLORIDE 1 MG/ML
INJECTION INTRAMUSCULAR; INTRAVENOUS
Status: COMPLETED
Start: 2024-06-20 | End: 2024-06-20

## 2024-06-20 RX ORDER — SODIUM CHLORIDE 9 MG/ML
INJECTION, SOLUTION INTRAVENOUS CONTINUOUS
Status: DISCONTINUED | OUTPATIENT
Start: 2024-06-20 | End: 2024-06-20

## 2024-06-20 RX ORDER — LIDOCAINE HYDROCHLORIDE 20 MG/ML
INJECTION, SOLUTION EPIDURAL; INFILTRATION; INTRACAUDAL; PERINEURAL
Status: COMPLETED
Start: 2024-06-20 | End: 2024-06-20

## 2024-06-20 RX ADMIN — SODIUM CHLORIDE: 9 INJECTION, SOLUTION INTRAVENOUS at 09:45:00

## 2024-06-20 RX ADMIN — SODIUM CHLORIDE: 9 INJECTION, SOLUTION INTRAVENOUS at 12:00:00

## 2024-06-20 NOTE — DISCHARGE INSTRUCTIONS
INTERVENTIONAL RADIOLOGY  [facility]  [phone]     Patient Name:  Chester Bautista    Procedure:  IR IVC FILTER REMOVAL    Site Care: Dermabond (skin glue) has been applied to your incision.  Do not scrub the area.  Allow the Dermabond to flake off on its own.                                       Activity/Diet  No heavy lifting or strenuous activity for 48 hours.  Drink plenty of fluids, unless you have otherwise been told to restrict your fluid intake.  Do not drink alcohol for 24 hours.  Do not drive,  operate heavy machinery, make important decisions or sign legal documents today.    Medications:  Take acetaminophen if needed for pain. Do not exceed 4000mg of acetaminophen in a 24-hour period. and Make no changes to your existing medications.    Contact Interventional Radiology at [phone] if you have severe/unrelieved pain, fever, chills, dizziness/lightheadedness, or drainage/bleeding from your incision site.

## 2024-06-20 NOTE — IVS NOTE
DISCHARGE NOTE     Pt is able to sit up and ambulate without difficulty.   Pt voided and tolerated fluids and food.   Procedural site remains dry and intact.  No signs and symptoms of bleeding/hematoma noted.   Port access removed.  Instruction provided, patient/family verbalizes understanding.   Dr. Boyle spoke with patient/family post procedure.     Pt discharge via wheelchair to [facility].    Follow up Appointment: none    New Prescription: none

## 2024-06-20 NOTE — INTERVAL H&P NOTE
The above referenced H&P was reviewed by Bradley Boyle MD on 6/20/2024, the patient was examined and no significant changes have occurred in the patient's condition since the H&P was performed.  Risks, benefits, alternative treatments and consequences of no treatment were discussed.  We will proceed with procedure as planned.      Bradley Boyle MD  6/20/2024  10:19 AM

## 2024-06-21 NOTE — PROGRESS NOTES
CBC Normal (white blood cells and platelets), anemia is present and stable from prior.  CMP Normal (electrolytes, and liver functions), declining kidney function from prior.  Potassium is low.  But also significantly high sugar not sure what is going on?  Would do 40 mill equivalents of potassium once a day and recheck potassium levels in 1 week.  Also hydrate at least 48 ounces of water a day.

## 2024-06-24 ENCOUNTER — TELEPHONE (OUTPATIENT)
Dept: ENDOCRINOLOGY CLINIC | Facility: CLINIC | Age: 84
End: 2024-06-24

## 2024-06-24 NOTE — TELEPHONE ENCOUNTER
Brea states she received a call from [facility] regarding supplies and is not sure what it was for.  States that the vendor has told her that office sent over orders, but Brea doesn't recall discussing this during office appointment back in May.  Please call.  Thank you.

## 2024-06-25 NOTE — TELEPHONE ENCOUNTER
Spoke to patient's daughter - she was unaware of libre3 sensor order to [facility] - when she spoke to [facility] she refused to give personal info - [facility] canceled order (per Coleman)  Patient's daughter stated patient would like to proceed with libre3 sensors (using phone as ) - daughter stated understanding that RN will contact [facility] to reorder libre3 sensors and that [facility] will contact her for next steps  Message sent via [facility] to [facility] that patient would like to proceed with libre3 sensors and will be expecting call/text from [facility] for next steps

## 2024-06-28 ENCOUNTER — NURSE ONLY (OUTPATIENT)
Dept: HEMATOLOGY/ONCOLOGY | Facility: HOSPITAL | Age: 84
End: 2024-06-28
Attending: INTERNAL MEDICINE
Payer: MEDICARE

## 2024-06-28 DIAGNOSIS — K90.9 MALABSORPTION OF IRON (HCC): ICD-10-CM

## 2024-06-28 DIAGNOSIS — Z45.2 ENCOUNTER FOR CARE RELATED TO VASCULAR ACCESS PORT: Primary | ICD-10-CM

## 2024-06-28 DIAGNOSIS — Q27.30 AVM (ARTERIOVENOUS MALFORMATION) (HCC): ICD-10-CM

## 2024-06-28 DIAGNOSIS — E87.6 HYPOKALEMIA: ICD-10-CM

## 2024-06-28 DIAGNOSIS — D50.0 IRON DEFICIENCY ANEMIA DUE TO CHRONIC BLOOD LOSS: ICD-10-CM

## 2024-06-28 DIAGNOSIS — E83.42 HYPOMAGNESEMIA: ICD-10-CM

## 2024-06-28 LAB
ANION GAP SERPL CALC-SCNC: 7 MMOL/L (ref 0–18)
BUN BLD-MCNC: 19 MG/DL (ref 9–23)
BUN/CREAT SERPL: 14.7 (ref 10–20)
CALCIUM BLD-MCNC: 9.3 MG/DL (ref 8.7–10.4)
CHLORIDE SERPL-SCNC: 104 MMOL/L (ref 98–112)
CO2 SERPL-SCNC: 25 MMOL/L (ref 21–32)
CREAT BLD-MCNC: 1.29 MG/DL
EGFRCR SERPLBLD CKD-EPI 2021: 41 ML/MIN/1.73M2 (ref 60–?)
GLUCOSE BLD-MCNC: 330 MG/DL (ref 70–99)
MAGNESIUM SERPL-MCNC: 1.4 MG/DL (ref 1.6–2.6)
OSMOLALITY SERPL CALC.SUM OF ELEC: 297 MOSM/KG (ref 275–295)
POTASSIUM SERPL-SCNC: 4.7 MMOL/L (ref 3.5–5.1)
SODIUM SERPL-SCNC: 136 MMOL/L (ref 136–145)

## 2024-06-28 PROCEDURE — 36591 DRAW BLOOD OFF VENOUS DEVICE: CPT

## 2024-06-28 PROCEDURE — 36593 DECLOT VASCULAR DEVICE: CPT

## 2024-06-28 PROCEDURE — 83735 ASSAY OF MAGNESIUM: CPT

## 2024-06-28 PROCEDURE — 80048 BASIC METABOLIC PNL TOTAL CA: CPT

## 2024-06-28 RX ORDER — WATER 10 ML/10ML
INJECTION INTRAMUSCULAR; INTRAVENOUS; SUBCUTANEOUS
Status: DISCONTINUED
Start: 2024-06-28 | End: 2024-06-28

## 2024-06-28 RX ORDER — HEPARIN SODIUM (PORCINE) LOCK FLUSH IV SOLN 100 UNIT/ML 100 UNIT/ML
5 SOLUTION INTRAVENOUS ONCE
Status: COMPLETED | OUTPATIENT
Start: 2024-06-28 | End: 2024-06-28

## 2024-06-28 RX ORDER — HEPARIN SODIUM (PORCINE) LOCK FLUSH IV SOLN 100 UNIT/ML 100 UNIT/ML
5 SOLUTION INTRAVENOUS ONCE
OUTPATIENT
Start: 2024-07-05

## 2024-06-28 RX ORDER — SODIUM CHLORIDE 9 MG/ML
10 INJECTION, SOLUTION INTRAMUSCULAR; INTRAVENOUS; SUBCUTANEOUS ONCE
OUTPATIENT
Start: 2024-07-05

## 2024-06-28 RX ADMIN — HEPARIN SODIUM (PORCINE) LOCK FLUSH IV SOLN 100 UNIT/ML 500 UNITS: 100 SOLUTION INTRAVENOUS at 10:32:00

## 2024-07-03 ENCOUNTER — TELEPHONE (OUTPATIENT)
Dept: ENDOCRINOLOGY CLINIC | Facility: CLINIC | Age: 84
End: 2024-07-03

## 2024-07-04 NOTE — TELEPHONE ENCOUNTER
Recent BMP lab result reviewed and BG value was 330. This is very high.     Please review BG readings in the past few days and also confirm medication regimen.       Please re-schedule f/u apt for a sooner date - ok to offer apt for 7/19 at 12:30pm as this is the soonest date available. Place on a wait list incase a sooner apt is available.       Thank you!

## 2024-07-05 ENCOUNTER — NURSE ONLY (OUTPATIENT)
Dept: ENDOCRINOLOGY CLINIC | Facility: CLINIC | Age: 84
End: 2024-07-05

## 2024-07-05 ENCOUNTER — NURSE ONLY (OUTPATIENT)
Dept: HEMATOLOGY/ONCOLOGY | Facility: HOSPITAL | Age: 84
End: 2024-07-05
Attending: INTERNAL MEDICINE
Payer: MEDICARE

## 2024-07-05 DIAGNOSIS — Z45.2 ENCOUNTER FOR CARE RELATED TO VASCULAR ACCESS PORT: Primary | ICD-10-CM

## 2024-07-05 DIAGNOSIS — K90.9 MALABSORPTION OF IRON (HCC): ICD-10-CM

## 2024-07-05 DIAGNOSIS — D50.0 IRON DEFICIENCY ANEMIA DUE TO CHRONIC BLOOD LOSS: ICD-10-CM

## 2024-07-05 DIAGNOSIS — Q27.30 AVM (ARTERIOVENOUS MALFORMATION) (HCC): ICD-10-CM

## 2024-07-05 DIAGNOSIS — E11.65 UNCONTROLLED TYPE 2 DIABETES MELLITUS WITH HYPERGLYCEMIA (HCC): Primary | ICD-10-CM

## 2024-07-05 PROCEDURE — 96372 THER/PROPH/DIAG INJ SC/IM: CPT

## 2024-07-05 RX ORDER — SODIUM CHLORIDE 9 MG/ML
10 INJECTION, SOLUTION INTRAMUSCULAR; INTRAVENOUS; SUBCUTANEOUS ONCE
OUTPATIENT
Start: 2024-08-02

## 2024-07-05 RX ORDER — HEPARIN SODIUM (PORCINE) LOCK FLUSH IV SOLN 100 UNIT/ML 100 UNIT/ML
5 SOLUTION INTRAVENOUS ONCE
Status: CANCELLED | OUTPATIENT
Start: 2024-08-02

## 2024-07-05 NOTE — PROGRESS NOTES
Patient arrived to clinic with daughter regarding Joselyn placement education and review insulin teaching. Reviewed Joselyn 3 sensor placement. Daughter placed patient supplied sensor on patient's left arm with supervision. Patient is already connected to clinic. Reviewed care of Joselyn sensor with her and the daughter.     Also had patient return demonstrate insulin administration and patient did it correctly. All questions answered and they declined further questions. Patient has appointment with Caren on 7/19/24.

## 2024-07-05 NOTE — TELEPHONE ENCOUNTER
I called and spoke with patient's daughter Rut. She has been trying to give her mother some independence with her diabetes control but has noticed her mom was having some trouble. At first she was only injecting 1 or 2 units of insulin per day, so rut educated her the dose is 12 units with her first meal of the day. Then she was having some issues with bending the needle with injection. Rut is available to help her daily with injections if needed. She will watch her mother this morning. They are getting up early for some appointments today. She will watch her mother check her sugar and inject her insulin and eat her breakfast independently. If she requires assistance she will help her and plan to help her in the future.     Also, they received their van sensors from [facility]. I scheduled them a nurse appt today with me to review how to place the sensors and set up reader/phone. Discussed phone will be able to connect her to clinic, they will decide at appt which they would like to use.     She states Prudencio did not check her sugars the past few days. 2 days ago she did check a fasting sugar which was 120. Suspect placing sensor today will increase compliance with blood sugar monitoring. Will see what her fasting sugar is this morning when they come in for nurse visit and will check sugar at nurse visit.     Rescheduled appt as indicated below and added to wait list.     If sugar is elevated at [place] today will consult on call provider for assistance in adjusting insulin dose.

## 2024-07-05 NOTE — TELEPHONE ENCOUNTER
Update noted. Agree with plan of placing cgm today and continue with cgm use.    Agree with starting to take correct insulin dose of 12 units with first meal of the day.     Patient is scheduled to see me in 2 weeks, thus BG readings will be reviewed at that time. If patient continues to have BG readings persistently > 250 to brandin and notify me.     Thank you

## 2024-07-05 NOTE — TELEPHONE ENCOUNTER
Spoke with patient's daughter. They received the CGM sensors and will be coming in today to review how to place again.

## 2024-07-08 ENCOUNTER — NURSE ONLY (OUTPATIENT)
Dept: HEMATOLOGY/ONCOLOGY | Facility: HOSPITAL | Age: 84
End: 2024-07-08
Attending: INTERNAL MEDICINE
Payer: MEDICARE

## 2024-07-08 ENCOUNTER — TELEPHONE (OUTPATIENT)
Dept: ENDOCRINOLOGY CLINIC | Facility: CLINIC | Age: 84
End: 2024-07-08

## 2024-07-08 ENCOUNTER — TELEPHONE (OUTPATIENT)
Dept: INTERNAL MEDICINE CLINIC | Facility: CLINIC | Age: 84
End: 2024-07-08

## 2024-07-08 DIAGNOSIS — N17.9 AKI (ACUTE KIDNEY INJURY) (HCC): ICD-10-CM

## 2024-07-08 DIAGNOSIS — D50.0 IRON DEFICIENCY ANEMIA DUE TO CHRONIC BLOOD LOSS: ICD-10-CM

## 2024-07-08 DIAGNOSIS — Z45.2 ENCOUNTER FOR CARE RELATED TO VASCULAR ACCESS PORT: Primary | ICD-10-CM

## 2024-07-08 DIAGNOSIS — K90.9 MALABSORPTION OF IRON (HCC): ICD-10-CM

## 2024-07-08 DIAGNOSIS — Q27.30 AVM (ARTERIOVENOUS MALFORMATION) (HCC): ICD-10-CM

## 2024-07-08 DIAGNOSIS — D50.9 IRON DEFICIENCY ANEMIA, UNSPECIFIED IRON DEFICIENCY ANEMIA TYPE: ICD-10-CM

## 2024-07-08 LAB
ANION GAP SERPL CALC-SCNC: 6 MMOL/L (ref 0–18)
BASOPHILS # BLD AUTO: 0.04 X10(3) UL (ref 0–0.2)
BASOPHILS NFR BLD AUTO: 0.5 %
BUN BLD-MCNC: 14 MG/DL (ref 9–23)
BUN/CREAT SERPL: 10 (ref 10–20)
CALCIUM BLD-MCNC: 8.8 MG/DL (ref 8.7–10.4)
CHLORIDE SERPL-SCNC: 102 MMOL/L (ref 98–112)
CO2 SERPL-SCNC: 27 MMOL/L (ref 21–32)
CREAT BLD-MCNC: 1.4 MG/DL
DEPRECATED RDW RBC AUTO: 47.1 FL (ref 35.1–46.3)
EGFRCR SERPLBLD CKD-EPI 2021: 37 ML/MIN/1.73M2 (ref 60–?)
EOSINOPHIL # BLD AUTO: 0.19 X10(3) UL (ref 0–0.7)
EOSINOPHIL NFR BLD AUTO: 2.2 %
ERYTHROCYTE [DISTWIDTH] IN BLOOD BY AUTOMATED COUNT: 14.2 % (ref 11–15)
GLUCOSE BLD-MCNC: 476 MG/DL (ref 70–99)
HCT VFR BLD AUTO: 32.3 %
HGB BLD-MCNC: 10.2 G/DL
IMM GRANULOCYTES # BLD AUTO: 0.04 X10(3) UL (ref 0–1)
IMM GRANULOCYTES NFR BLD: 0.5 %
LYMPHOCYTES # BLD AUTO: 1.21 X10(3) UL (ref 1–4)
LYMPHOCYTES NFR BLD AUTO: 14.3 %
MCH RBC QN AUTO: 28.8 PG (ref 26–34)
MCHC RBC AUTO-ENTMCNC: 31.6 G/DL (ref 31–37)
MCV RBC AUTO: 91.2 FL
MONOCYTES # BLD AUTO: 0.84 X10(3) UL (ref 0.1–1)
MONOCYTES NFR BLD AUTO: 9.9 %
NEUTROPHILS # BLD AUTO: 6.13 X10 (3) UL (ref 1.5–7.7)
NEUTROPHILS # BLD AUTO: 6.13 X10(3) UL (ref 1.5–7.7)
NEUTROPHILS NFR BLD AUTO: 72.6 %
OSMOLALITY SERPL CALC.SUM OF ELEC: 301 MOSM/KG (ref 275–295)
PLATELET # BLD AUTO: 253 10(3)UL (ref 150–450)
POTASSIUM SERPL-SCNC: 3.5 MMOL/L (ref 3.5–5.1)
RBC # BLD AUTO: 3.54 X10(6)UL
SODIUM SERPL-SCNC: 135 MMOL/L (ref 136–145)
WBC # BLD AUTO: 8.5 X10(3) UL (ref 4–11)

## 2024-07-08 PROCEDURE — 36591 DRAW BLOOD OFF VENOUS DEVICE: CPT

## 2024-07-08 PROCEDURE — 85025 COMPLETE CBC W/AUTO DIFF WBC: CPT

## 2024-07-08 PROCEDURE — 80048 BASIC METABOLIC PNL TOTAL CA: CPT

## 2024-07-08 RX ORDER — SODIUM CHLORIDE 9 MG/ML
10 INJECTION, SOLUTION INTRAMUSCULAR; INTRAVENOUS; SUBCUTANEOUS ONCE
OUTPATIENT
Start: 2024-08-02

## 2024-07-08 RX ORDER — HEPARIN SODIUM (PORCINE) LOCK FLUSH IV SOLN 100 UNIT/ML 100 UNIT/ML
5 SOLUTION INTRAVENOUS ONCE
OUTPATIENT
Start: 2024-08-02

## 2024-07-08 RX ORDER — HEPARIN SODIUM (PORCINE) LOCK FLUSH IV SOLN 100 UNIT/ML 100 UNIT/ML
5 SOLUTION INTRAVENOUS ONCE
Status: COMPLETED | OUTPATIENT
Start: 2024-07-08 | End: 2024-07-08

## 2024-07-08 RX ADMIN — HEPARIN SODIUM (PORCINE) LOCK FLUSH IV SOLN 100 UNIT/ML 500 UNITS: 100 SOLUTION INTRAVENOUS at 13:25:00

## 2024-07-08 NOTE — TELEPHONE ENCOUNTER
Vic,     Received call from pt daughter. Pt had labs done this morning. Result was BG of 479. Pt had eaten BF before labs. Report sent via [employer]. Pt has been persistently over 400 for the past 3 days.     Hyperglycemia     Onset of hyperglycemia: Past 3 days    BG levels: Joselyn report printed    Symptoms: Denies all symptoms    Pattern of hyperglycemia: All day    Steroid therapy: Prednisone 5 mg daily    Acute Illness: UTI 2 weeks ago, pt finished course of antibiotics    Change in Diet:   Meals are variable, 2 meals and snacks   Pt snacks throughout the day    List DM Medications/Compliance:  Insulin 70/30: 12 units with lunch

## 2024-07-08 NOTE — TELEPHONE ENCOUNTER
Asked clinical staff to get a hold of this pt  I currently am at the [place] Office and  is at the [facility]

## 2024-07-08 NOTE — PROGRESS NOTES
BMP Normal (electrolytes, and liver functions), declining kidney function is slightly worse but may be from high sugars.  Her potassium electrolytes are normal.  Has she taking her insulin before the testing?  How compliant is she with her insulin?  If she eating more out eating more sweets?  Make sure he hydrates at least 48 ounces of water daily and her check her sugars and call me if she follow-up with endocrinology if not she needs to see endocrine ASAP.  Just received her results after 12 noon.  Recheck kidney function in 1 week.

## 2024-07-08 NOTE — TELEPHONE ENCOUNTER
Spoke to pt's daughter, Brea. Provided medication instructions as written below by Vic KHAN. Verbalized understanding. Will call our office tomorrow with blood sugar update.

## 2024-07-08 NOTE — TELEPHONE ENCOUNTER
Dr Sorto, please see critical lab result below and adviseTanvir Pickens for [facility] calling with Critical Result:  Drawn 7/8/24 1:12  pm  Glucose: 476

## 2024-07-08 NOTE — TELEPHONE ENCOUNTER
Suzie sent to Dr Sorto re critical lab result.  Called Dr Sorto's office, reported  critical lab glucose 476.

## 2024-07-08 NOTE — TELEPHONE ENCOUNTER
Noted CGM report reviewed and hyperglycemia noted.     Please ask patient to adjust her insulin dose to 18 units with first meal.     If she has already taken 12 units today, ok to take an additional 6 units now.       She should give me an update on her BG readings again tomorrow mid day.     Thank you!

## 2024-07-09 ENCOUNTER — TELEMEDICINE (OUTPATIENT)
Facility: CLINIC | Age: 84
End: 2024-07-09

## 2024-07-09 DIAGNOSIS — E83.42 HYPOMAGNESEMIA: ICD-10-CM

## 2024-07-09 DIAGNOSIS — N17.9 AKI (ACUTE KIDNEY INJURY) (HCC): Primary | ICD-10-CM

## 2024-07-09 PROBLEM — N18.30 CKD (CHRONIC KIDNEY DISEASE) STAGE 3, GFR 30-59 ML/MIN (HCC): Chronic | Status: ACTIVE | Noted: 2024-07-09

## 2024-07-09 PROCEDURE — 99214 OFFICE O/P EST MOD 30 MIN: CPT | Performed by: INTERNAL MEDICINE

## 2024-07-09 PROCEDURE — 1159F MED LIST DOCD IN RCRD: CPT | Performed by: INTERNAL MEDICINE

## 2024-07-09 PROCEDURE — 1160F RVW MEDS BY RX/DR IN RCRD: CPT | Performed by: INTERNAL MEDICINE

## 2024-07-09 NOTE — PROGRESS NOTES
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    She has  hypomagnesemia since feb 2024. Reviewed labs no mag levels done before. Never had issues before.   No diarrhea, vomiting. No diuretic use. Appetite is fair as well  Noted to be on PPI for a long time    Since lov  Been off ppi  Taking pepcid  Also bs running v high  Also had UTI    I conducted this encounter from work via live, face-to-face video conference with the patient. The patient was located at home. Is the patient located in the [facility] at the time of the encounter? Yes. I was located at work. Prior to the interview, the risks and benefits of telemedicine were discussed with the patient along with potential responsibility for payment.     This telehealth visit is being conducted at the patient's request.  Verbal consent was obtained from the patient to allow this to be designated as a telehealth encounter in lieu of a face-to-face office visit, the latter having been offered to the patient as an option to be scheduled at a later date. : Yes  Accordingly, any physical exam detailed is from audio and/or video inspection performed during this patient encounter.        HISTORY:  Past Medical History:    Anxiety state    Cancer (HCC)    breast cancer 2018    Chronic obstructive pulmonary disease, unspecified (HCC)    Chronic obstructive pulmonary disease, unspecified (HCC)    COPD (chronic obstructive pulmonary disease) (HCC)    Diabetes (HCC)    Diabetes mellitus (HCC)    Essential hypertension    Exposure to medical diagnostic radiation    High blood pressure    High cholesterol    History of blood transfusion    low hemoglobin    Osteoarthritis    PONV (postoperative nausea and vomiting)    Pulmonary  embolism (HCC)    Pulmonary emphysema (HCC)    Rheumatoid arthritis (HCC)      Past Surgical History:   Procedure Laterality Date    Cataract extraction w/  intraocular lens implant Bilateral 2017    Dr in [facility]     Colonoscopy      Colonoscopy N/A 07/21/2022    Procedure: COLONOSCOPY;  Surgeon: Mikey Garcia MD;  Location: [facility] ENDOSCOPY    Colonoscopy N/A 06/15/2023    Procedure: COLONOSCOPY;  Surgeon: Mikey Garcia MD;  Location: [facility] ENDOSCOPY    Correct bunion,simple      right foot  1993    Egd  12/21/2023    Dr. Garcia; Duodenal AVM's x4 cauterized    Hysterectomy      1972, no abnormal Paps, due to heavy bleeding with fibroids.  Not sure if it had bilateral salpingo-oophorectomy.    Ir aneurysm repairm  2010    Computer assisted volumetric craniofomy with clipping of anterior cerebral artery.    Lumpectomy right      Radiation right      Rotator cuff repair      1993    Total knee replacement Right 2010    Total knee replacement Left 07/02/2015    Transoral incisionless fundoplication - internal  01/25/2012 Fredy fundoplication at [facility] Dr. Fournier.    Fredy fundoplication at [facility] Dr. Fournier.    Trigger finger release      left hand  2005    Yag capsulotomy - ou - both eyes Bilateral 09/2021    done in [place]           Medications (Active prior to today's visit):  Current Outpatient Medications   Medication Sig Dispense Refill    Potassium Chloride ER 20 MEQ Oral Tab CR Take 40 mEq by mouth every morning. 6 tablet 0    BD PEN NEEDLE KADEN 2ND GEN 32G X 4 MM Does not apply Misc 1 EACH DAILY. USE DAILY WITH INSULIN      OMEPRAZOLE 20 MG Oral Capsule Delayed Release TAKE 1 CAPSULE BY MOUTH EVERY DAY IN THE MORNING (Patient not taking: Reported on 6/12/2024) 90 capsule 1    torsemide 20 MG Oral Tab Take 1 tablet (20 mg total) by mouth daily.      Insulin Aspart Prot & Aspart (INSULIN ASP PROT & ASP FLEXPEN) (70-30) 100 UNIT/ML Subcutaneous Suspension Pen-injector  Inject 12 Units into the skin daily with lunch. 9 mL 0    Insulin Pen Needle 33G X 4 MM Does not apply Misc 1 each daily. Use daily with insulin 100 each 0    Alcohol Swabs (ALCOHOL PREP) Does not apply Pads 1 each daily. 100 each 0    glipiZIDE 5 MG Oral Tab Take 2 tablets (10 mg total) by mouth before breakfast. 180 tablet 0    valsartan 80 MG Oral Tab Take 1 tablet (80 mg total) by mouth daily. 90 tablet 3    cyclobenzaprine 10 MG Oral Tab Take 1 tablet (10 mg total) by mouth nightly as needed. 90 tablet 1    tamsulosin 0.4 MG Oral Cap Take 1 capsule (0.4 mg total) by mouth daily. 90 capsule 1    carvedilol 12.5 MG Oral Tab Take 1 tablet (12.5 mg total) by mouth 2 (two) times daily with meals. 180 tablet 3    predniSONE 5 MG Oral Tab Take 1 tablet (5 mg total) by mouth daily. 90 tablet 1    fluticasone-salmeterol (WIXELA INHUB) 250-50 MCG/ACT Inhalation Aerosol Powder, Breath Activated Inhale 1 puff into the lungs every 12 (twelve) hours. 1 each 5    Lancets Does not apply Misc Dx. E11.9. Checks qam. 50 each 6    Blood Glucose Monitoring Suppl (BLOOD GLUCOSE SYSTEM GILLES) Does not apply Kit Dx. E11.9. Checks qam. 1 kit 0    Blood Glucose Monitoring Suppl w/Device Does not apply Kit Dx. E11.9. Checks qam. 1 kit 0    Blood Gluc Meter Disp-Strips Does not apply Device Dx. E11.9. Checks qam. 100 each 0    gabapentin 100 MG Oral Cap Take 1 capsule (100 mg total) by mouth nightly. 90 capsule 3    midodrine 2.5 MG Oral Tab Take 1 tablet (2.5 mg total) by mouth in the morning and 1 tablet (2.5 mg total) at noon and 1 tablet (2.5 mg total) in the evening. Hold if BP > 120/80.. 90 tablet 0    LORATADINE 10 MG Oral Tab TAKE 1 TABLET BY MOUTH EVERY DAY 90 tablet 1    Accu-Chek FastClix Lancets Does not apply Misc Check sugar once daily as directed (E11.42) 100 each 2    hydroxychloroquine 200 MG Oral Tab Take 1 tablet (200 mg total) by mouth daily. 90 tablet 1    Glucose Blood (ACCU-CHEK GUIDE) In Vitro Strip Check blood sugar  twice a day (fasting and before dinner). DX: E11.42, NIDDM 200 strip 1    rosuvastatin (CRESTOR) 20 MG Oral Tab Take 1 tablet (20 mg total) by mouth nightly. 90 tablet 0    glycopyrrolate 1 MG Oral Tab Take 1 tablet (1 mg total) by mouth 3 (three) times daily. 90 tablet 1    albuterol 108 (90 Base) MCG/ACT Inhalation Aero Soln inhale 2 puff by inhalation route  every 4 - 6 hours as needed 1 each 0    montelukast 10 MG Oral Tab Take 1 tablet (10 mg total) by mouth nightly. 90 tablet 3    albuterol 108 (90 Base) MCG/ACT Inhalation Aero Soln inhale 2 puff by inhalation route  every 4 - 6 hours as needed 1 each 5    Blood Glucose Monitoring Suppl (ONETOUCH VERIO) w/Device Does not apply Kit 1 each daily. Test 1x daily 1 kit 0    Glucose Blood (ONETOUCH VERIO) In Vitro Strip Test 1x daily 100 strip 1    OneTouch Delica Lancets 33G Does not apply Misc 4 x daily 100 each 1    Blood Glucose Monitoring Suppl (BLOOD GLUCOSE SYSTEM GILLES) Does not apply Kit DX E 11.9.  Checks every morning. 1 kit 0    Lancets Does not apply Misc DX E 11.9.  Checks every morning. 50 each 11    Blood Glucose Monitoring Suppl w/Device Does not apply Kit DX E 11.9.  Checks every morning. 1 kit 0    Blood Gluc Meter Disp-Strips Does not apply Device DX E 11.9.  Checks every morning. 100 each 11    acetaminophen 500 MG Oral Tab Take 1 tablet (500 mg total) by mouth daily.       OFF midodrine  Not on Torsemide  Not on kcl  Not on omeprazole    Allergies:  Allergies   Allergen Reactions    Celebrex [Celecoxib] PALPITATIONS    Penicillins ITCHING and SWELLING    Metformin And Related UNKNOWN    Olmesartan UNKNOWN         ROS:     Constitutional:  Negative for decreased activity, fever, irritability and lethargy  ENMT:  Negative for ear drainage, hearing loss and nasal drainage  Eyes:  Negative for eye discharge and vision loss  Cardiovascular:  Negative for chest pain, sob  Respiratory:  Negative for cough, dyspnea and wheezing  Gastrointestinal:   Negative for abdominal pain, constipation  Genitourinary:  Negative for dysuria and hematuria  Endocrine:  Negative for abnormal sleep patterns  Hema/Lymph:  Negative for easy bleeding and easy bruising  Integumentary:  Negative for pruritus and rash  Musculoskeletal:  Negative for bone/joint symptoms  Neurological:  Negative for gait disturbance  Psychiatric:  Negative for inappropriate interaction and psychiatric symptoms      There were no vitals filed for this visit.    PHYSICAL EXAM:   Constitutional: appears well hydrated alert and responsive   Head/Face: normocephalic  Respiratory:  no resp distress  Extremities: no edema  Neurological:  Grossly normal      Lab Results   Component Value Date     (L) 07/08/2024     06/28/2024     06/20/2024    K 3.5 07/08/2024    K 4.7 06/28/2024    K 3.4 (L) 06/20/2024     07/08/2024     06/28/2024     06/20/2024    CO2 27.0 07/08/2024    CO2 25.0 06/28/2024    CO2 27.0 06/20/2024    BUN 14 07/08/2024    BUN 19 06/28/2024    BUN 12 06/20/2024    CREATSERUM 1.40 (H) 07/08/2024    CREATSERUM 1.29 (H) 06/28/2024    CREATSERUM 1.15 (H) 06/20/2024    CA 8.8 07/08/2024    CA 9.3 06/28/2024    CA 9.0 06/20/2024    EGFRCR 37 (L) 07/08/2024    EGFRCR 41 (L) 06/28/2024    EGFRCR 47 (L) 06/20/2024    ALB 3.6 06/20/2024    ALB 3.7 06/07/2024    ALB 3.5 03/25/2024    PHOS 3.9 06/07/2024          ASSESSMENT/PLAN:   Assessment   1. DARWIN (acute kidney injury) (HCC)    2. Hypomagnesemia  - Magnesium; Future       DARWIN  Likely in setting of hyperglycemia causing polyuria and prerenal DARWIN  Also UTI could be contributing but patient says she took abx   She states she has NOT been taking torsemide  Will keep it that way  Her insulin been adjusted and will work on low carb diet and getting BS in control  Will repeat labs in few  weeks and fu in clinic    Hypomagnesemia   Likely sec to omeprazole causing impaired absorption of magnesium by intestinal epithelial  cells bc of ppi induced inhibition of TRPM6 and TRPM7 channels.  Taking mag oxide 800 mg po four  times a day     She came off ppi and now on pepcid and tolerating well  On magoxide three times a day  Recent low level prob bc of polyuria  Will repeat in few weeks    PLAN  Not been taking torsemide  Will continbue holding it  August 2 nd blood test and  Will see me August 6 th  Hydrate well  Keep sugars in control  Get her medication list/bottles along     Orders This Visit:  Orders Placed This Encounter   Procedures    Magnesium       Meds This Visit:  Requested Prescriptions      No prescriptions requested or ordered in this encounter       Imaging & Referrals:  None       Hannah Encinas MD  7/9/2024

## 2024-07-10 NOTE — TELEPHONE ENCOUNTER
I called and spoke with the patient's daughter Brea. She will increase first meal dose to 24 units today. Will call tomorrow with update.

## 2024-07-10 NOTE — TELEPHONE ENCOUNTER
BG readings reviewed via "[employer] portal and it was noted that her glucose readings continue to be severely elevated.     Please ask patient to adjust her 70/30 insulin dose from 18 --> 24 units with fist meal.     She should give me another update on her BG readings on Thursday 7/11    Thank you!

## 2024-07-11 ENCOUNTER — PATIENT MESSAGE (OUTPATIENT)
Dept: INTERNAL MEDICINE CLINIC | Facility: CLINIC | Age: 84
End: 2024-07-11

## 2024-07-11 DIAGNOSIS — E11.9 TYPE 2 DIABETES MELLITUS WITHOUT RETINOPATHY (HCC): Primary | ICD-10-CM

## 2024-07-11 NOTE — TELEPHONE ENCOUNTER
CGM download reviewed and it seems that her BG readings continue to be above goal in upper 300s yesterday during the day and upper 200s overnight.       Please ask patient to increase insulin dose 24--> 32 units today.     If she already injected 24 units ok to inject an additional dose to 8 units to total 32 units for the day.       I will review her BG readings again tomorrow and update with any dose changes if needed.     Thank you!

## 2024-07-11 NOTE — TELEPHONE ENCOUNTER
Spoke to pt's daughter, Brea. Provided medication instructions as written below by Vic KHAN. Verbalized understanding. No other questions or concerns.

## 2024-07-12 NOTE — TELEPHONE ENCOUNTER
Spoke to pt's daughter Brea. Provided instructions written below by Vic KHAN. Strongly advised having the pt eat due to sugar rapidly dropping. Brea stated she will have patient eat right now.     Strongly advised pt's daughter to have pt take insulin 20 minutes prior to eating. Meals should include majority fiber and protein to help stabilize sugars. Notified of on call doctor if sugars are too low or too high. Verbalized understanding. No other questions or concerns at this time.

## 2024-07-12 NOTE — TELEPHONE ENCOUNTER
Caren -- Spoke to pt's daughter Brea, she is with the patient right now.     Yesterday, when sugar dropped to 65 - pt had given herself the 24 units and then added the additional 8 for a total of 32 but pt did not eat after. Time had already since her initial first dose of 24 and nothing was ate in between. Pt aware sugar dropped to 65, felt nauseous and jittery. Initiated rule of 15 (4oz of juice) and then felt much better after. Pt was okay rest of the day.     Today, pt only took 12 units because pt was waiting for insulin pen to come to room temp since 1 pen finished (only had 12 units left). Pt is now going to take the remaining 20 units. Pt's daughter aware to watch sugar after.

## 2024-07-12 NOTE — TELEPHONE ENCOUNTER
Cgm data reviewed and it seems that patient had a hypoglycemia episode yesterday with BG reading of 65 around 2:30pm. Please clarify with patient what happened around this time yesterday. Did patient inject insulin and not eat within 20-30 minutes? Did she have a lighter carb first meal than usual?     Glucose readings seem improved today, although still not at goal. Has he injected insulin today? If so, clarify dose she administered.     Thank you!

## 2024-07-12 NOTE — TELEPHONE ENCOUNTER
From: Chester Bautista  To: Heike Sorto  Sent: 7/11/2024 4:16 PM CDT  Subject: Referral Request     Hi Dr. Sorto,  My mom needs a referral for her year return visit with Dr. Quinton Tesfaye, her appointment is July 17th. I apologize for the last minute   notice, it slipped my mind she needed this referral with everything else going on with her.  Thank you,  Chester Bautista 2/21/1940

## 2024-07-12 NOTE — TELEPHONE ENCOUNTER
Update noted.  Please as patient/daughter to make sure that patient is eating something it appears that her glucose reading has rapidly reduced and is around 150s.     I suspect that the rapid decline is likely related to injecting insulin at two different times and not prior to eating for the second injection. To be on the conservative side, please ask patient to reduce insulin dose tomorrow to 28 units and inject all dose at one time (before 20 minutes prior to eating).     I will plan to review her glucose readings again on Monday. She should call on call MD if she develops hypoglycemia over the weekend.       Thank you!

## 2024-07-15 NOTE — TELEPHONE ENCOUNTER
Reviewed BG readings reviewed via [employer] nayely.     Patient's daughter was called and glucose patterns reviewed.     It seem that patient has been overcorrecting when experiencing a low glucose reading and in the past 2 days has been eating a higher carb diet (more than usual to prevent a hypoglycemia episode).       Discussed to reduce 70/30 insulin dose to 22 units with first meal (inject 20 mins prior ) and proceed eating per usual to be able to evaluate glucose patterns without modified diet.     Patient has apt on Friday 7/19 and will plan to review glucose readings at this time again.       Instructions given to patient's daughter on how to share glucose data with nayely with her mom (so she is able to view). Instructions were given via [employer]..

## 2024-07-17 ENCOUNTER — OFFICE VISIT (OUTPATIENT)
Dept: OPHTHALMOLOGY | Facility: CLINIC | Age: 84
End: 2024-07-17

## 2024-07-17 DIAGNOSIS — H40.003 GLAUCOMA SUSPECT OF BOTH EYES: ICD-10-CM

## 2024-07-17 DIAGNOSIS — H04.123 DRY EYE SYNDROME OF BOTH EYES: ICD-10-CM

## 2024-07-17 DIAGNOSIS — Z96.1 PSEUDOPHAKIA OF BOTH EYES: ICD-10-CM

## 2024-07-17 DIAGNOSIS — E11.9 TYPE 2 DIABETES MELLITUS WITHOUT RETINOPATHY (HCC): Primary | ICD-10-CM

## 2024-07-17 DIAGNOSIS — H43.393 VITREOUS FLOATERS OF BOTH EYES: ICD-10-CM

## 2024-07-17 DIAGNOSIS — Z79.899 HIGH RISK MEDICATION USE: ICD-10-CM

## 2024-07-17 PROCEDURE — 1126F AMNT PAIN NOTED NONE PRSNT: CPT | Performed by: OPHTHALMOLOGY

## 2024-07-17 PROCEDURE — 92014 COMPRE OPH EXAM EST PT 1/>: CPT | Performed by: OPHTHALMOLOGY

## 2024-07-17 PROCEDURE — 92250 FUNDUS PHOTOGRAPHY W/I&R: CPT | Performed by: OPHTHALMOLOGY

## 2024-07-17 PROCEDURE — 1159F MED LIST DOCD IN RCRD: CPT | Performed by: OPHTHALMOLOGY

## 2024-07-17 PROCEDURE — 1160F RVW MEDS BY RX/DR IN RCRD: CPT | Performed by: OPHTHALMOLOGY

## 2024-07-17 NOTE — PATIENT INSTRUCTIONS
Type 2 diabetes mellitus without retinopathy (HCC)  Diabetes type II: no background of retinopathy, no signs of neovascularization noted.  Discussed ocular and systemic benefits of blood sugar control.  Diagnosis and treatment discussed in detail with patient.    Glaucoma suspect of both eyes  Discussed with patient that she is a glaucoma suspect based on increased cupping of the optic nerves in both eyes.   Retinal photos taken today to document optic nerves.  Glaucoma diagnostic testing ordered.  Will not start medication, but will continue to observe.  Patient verbalized understanding.    Will see patient for next available visual field, and OCT with no MD, if glaucoma diagnostics are normal will see patient in 1 year for a diabetic/Plaquenil  exam        High risk medication use   No evidence of plaquenil toxicity in either eye.  Will follow in 1 year for a plaquenil eye exam based on current guidelines.   Patient is approved to continue on plaquenil as directed by their PCP or rheumatologist.     Pseudophakia of both eyes  No treatment  Continue with OTC reading glasses    Vitreous floaters of both eyes   There is no evidence of retinal pathology.  All signs and symptoms of retinal detachment/tears explained in detail.    Patient instructed to call the office if they experience increase in floaters, increase in flashes of light, loss of vision or curtain or veil effect.       Dry eye syndrome of both eyes  Schirmer's test today revealed dry eyes.  Patient was instructed to use warm compresses to the eyelids twice a day everyday.    Instructions for warm compress use:   Patient should place wash compresses on both eyelids for 5 minutes every morning and every night.  After 5 minutes of holding the warm compresses on the eyelids, patient should gently rub the eyelashes and then rinse thoroughly with warm water.   Patient should also use artificial tears (any over the counter brand is okay) up to 4-6  times per  day as needed for dry eye symptoms.

## 2024-07-17 NOTE — ASSESSMENT & PLAN NOTE
Schirmer's test today revealed dry eyes.  Patient was instructed to use warm compresses to the eyelids twice a day everyday.    Instructions for warm compress use:   Patient should place wash compresses on both eyelids for 5 minutes every morning and every night.  After 5 minutes of holding the warm compresses on the eyelids, patient should gently rub the eyelashes and then rinse thoroughly with warm water.   Patient should also use artificial tears (any over the counter brand is okay) up to 4-6  times per day as needed for dry eye symptoms.

## 2024-07-17 NOTE — PROGRESS NOTES
Chester Bautista is a 84 year old female.    HPI:     HPI    EP/ here for a DM and Plaquenil eye exam with photos.   Pt on Plaquenil for rheumatoid arthritis for about 1  year.   States the vision seems stable and no changes noted.   C/O eyes mostly red with mucus for which she uses Refresh eye drops 2-3 times a day that does not seems to help much.     Pt has been a diabetic for 3 years       Pt's diabetes is currently controlled by insulin  Pt checks BS is monitored regularly    Pt's last blood sugar was  68 mg/dl on 07/17/24  Last HA1C was 9.0 on 05/21/24  Endocrinologist: Ariane KHAN         Last edited by Ting Wynn OT on 7/17/2024  2:16 PM.        Patient History:  Past Medical History:    Anxiety state    Cancer (HCC)    breast cancer 2018    Chronic obstructive pulmonary disease, unspecified (HCC)    Chronic obstructive pulmonary disease, unspecified (HCC)    COPD (chronic obstructive pulmonary disease) (HCC)    Diabetes (HCC)    Diabetes mellitus (HCC)    Essential hypertension    Exposure to medical diagnostic radiation    High blood pressure    High cholesterol    History of blood transfusion    low hemoglobin    Osteoarthritis    PONV (postoperative nausea and vomiting)    Pulmonary embolism (HCC)    Pulmonary emphysema (HCC)    Rheumatoid arthritis (HCC)       Surgical History: Chester Bautista has a past surgical history that includes correct bunion,simple (right foot  1993); Trigger finger release (left hand  2005); hysterectomy (1972, no abnormal Paps, due to heavy bleeding with fibroids.  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 Fournier.) (Fredy fundoplication at [facility] Dr. Fournier.); total knee replacement (Left, 2015); colonoscopy; lumpectomy right; colonoscopy (N/A, 2022) (Procedure: COLONOSCOPY;  Surgeon: Mikey Garcia MD;  Location: [facility] ENDOSCOPY); Cataract extraction w/  intraocular lens implant (Bilateral, 2017) (Dr in [facility] ); Yag Capsulotomy - OU - Both Eyes (Bilateral, 2021) (done in [place]); radiation right; colonoscopy (N/A, 06/15/2023) (Procedure: COLONOSCOPY;  Surgeon: Mikey Garcia MD;  Location: [facility] ENDOSCOPY); and egd (2023) (Dr. Garcia; Duodenal AVM's x4 cauterized).    Family History   Problem Relation Age of Onset    Heart Surgery Mother         Leaky valve at 39 y/o.     Prostate Cancer Brother     Cancer Brother     Diabetes Maternal Grandmother     Diabetes Paternal Aunt     Breast Cancer Self 78    Breast Cancer Niece         before 50    Macular degeneration Neg     Glaucoma Neg        Social History:   Social History     Socioeconomic History    Marital status:    Tobacco Use    Smoking status: Former     Current packs/day: 0.00     Types: Cigarettes     Quit date:      Years since quittin.5     Passive exposure: Never    Smokeless tobacco: Never    Tobacco comments:     [id number]   Vaping Use    Vaping status: Never Used   Substance and Sexual Activity    Alcohol use: Never    Drug use: Never   Social History Narrative    Just moved in from [place].   passed away and that is why moved from [place] to be with daughter who lives in [place].  Currently lives with daughter.     Social Determinants of Health     Financial Resource Strain: Low Risk  (2024)    Financial Resource Strain     Difficulty of Paying Living Expenses: Not hard at all     Med Affordability: No   Food Insecurity: No Food Insecurity (2024)    Food Insecurity     Food Insecurity: Never true   Transportation Needs: No Transportation Needs (2024)    Transportation Needs     Lack of Transportation: No    Housing Stability: Low Risk  (2/23/2024)    Housing Stability     Housing Instability: No       Medications:  Current Outpatient Medications   Medication Sig Dispense Refill    Potassium Chloride ER 20 MEQ Oral Tab CR Take 40 mEq by mouth every morning. 6 tablet 0    BD PEN NEEDLE KADEN 2ND GEN 32G X 4 MM Does not apply Misc 1 EACH DAILY. USE DAILY WITH INSULIN      OMEPRAZOLE 20 MG Oral Capsule Delayed Release TAKE 1 CAPSULE BY MOUTH EVERY DAY IN THE MORNING 90 capsule 1    torsemide 20 MG Oral Tab Take 1 tablet (20 mg total) by mouth daily.      Insulin Aspart Prot & Aspart (INSULIN ASP PROT & ASP FLEXPEN) (70-30) 100 UNIT/ML Subcutaneous Suspension Pen-injector Inject 12 Units into the skin daily with lunch. (Patient taking differently: Inject 24 Units into the skin daily with lunch.) 9 mL 0    Insulin Pen Needle 33G X 4 MM Does not apply Misc 1 each daily. Use daily with insulin 100 each 0    Alcohol Swabs (ALCOHOL PREP) Does not apply Pads 1 each daily. 100 each 0    glipiZIDE 5 MG Oral Tab Take 2 tablets (10 mg total) by mouth before breakfast. 180 tablet 0    valsartan 80 MG Oral Tab Take 1 tablet (80 mg total) by mouth daily. 90 tablet 3    cyclobenzaprine 10 MG Oral Tab Take 1 tablet (10 mg total) by mouth nightly as needed. 90 tablet 1    tamsulosin 0.4 MG Oral Cap Take 1 capsule (0.4 mg total) by mouth daily. 90 capsule 1    carvedilol 12.5 MG Oral Tab Take 1 tablet (12.5 mg total) by mouth 2 (two) times daily with meals. 180 tablet 3    predniSONE 5 MG Oral Tab Take 1 tablet (5 mg total) by mouth daily. 90 tablet 1    fluticasone-salmeterol (WIXELA INHUB) 250-50 MCG/ACT Inhalation Aerosol Powder, Breath Activated Inhale 1 puff into the lungs every 12 (twelve) hours. 1 each 5    Lancets Does not apply Misc Dx. E11.9. Checks qam. 50 each 6    Blood Glucose Monitoring Suppl (BLOOD GLUCOSE SYSTEM GILLES) Does not apply Kit Dx. E11.9. Checks qam. 1 kit 0    Blood Glucose Monitoring Suppl w/Device Does not apply  Kit Dx. E11.9. Checks qam. 1 kit 0    Blood Gluc Meter Disp-Strips Does not apply Device Dx. E11.9. Checks qam. 100 each 0    gabapentin 100 MG Oral Cap Take 1 capsule (100 mg total) by mouth nightly. 90 capsule 3    midodrine 2.5 MG Oral Tab Take 1 tablet (2.5 mg total) by mouth in the morning and 1 tablet (2.5 mg total) at noon and 1 tablet (2.5 mg total) in the evening. Hold if BP > 120/80.. 90 tablet 0    LORATADINE 10 MG Oral Tab TAKE 1 TABLET BY MOUTH EVERY DAY 90 tablet 1    Accu-Chek FastClix Lancets Does not apply Misc Check sugar once daily as directed (E11.42) 100 each 2    hydroxychloroquine 200 MG Oral Tab Take 1 tablet (200 mg total) by mouth daily. 90 tablet 1    Glucose Blood (ACCU-CHEK GUIDE) In Vitro Strip Check blood sugar twice a day (fasting and before dinner). DX: E11.42, NIDDM 200 strip 1    rosuvastatin (CRESTOR) 20 MG Oral Tab Take 1 tablet (20 mg total) by mouth nightly. 90 tablet 0    albuterol 108 (90 Base) MCG/ACT Inhalation Aero Soln inhale 2 puff by inhalation route  every 4 - 6 hours as needed 1 each 0    montelukast 10 MG Oral Tab Take 1 tablet (10 mg total) by mouth nightly. 90 tablet 3    albuterol 108 (90 Base) MCG/ACT Inhalation Aero Soln inhale 2 puff by inhalation route  every 4 - 6 hours as needed 1 each 5    Blood Glucose Monitoring Suppl (ONETOUCH VERIO) w/Device Does not apply Kit 1 each daily. Test 1x daily 1 kit 0    Glucose Blood (ONETOUCH VERIO) In Vitro Strip Test 1x daily 100 strip 1    OneTouch Delica Lancets 33G Does not apply Misc 4 x daily 100 each 1    Blood Glucose Monitoring Suppl (BLOOD GLUCOSE SYSTEM GILLES) Does not apply Kit DX E 11.9.  Checks every morning. 1 kit 0    Lancets Does not apply Misc DX E 11.9.  Checks every morning. 50 each 11    Blood Glucose Monitoring Suppl w/Device Does not apply Kit DX E 11.9.  Checks every morning. 1 kit 0    Blood Gluc Meter Disp-Strips Does not apply Device DX E 11.9.  Checks every morning. 100 each 11    acetaminophen  500 MG Oral Tab Take 1 tablet (500 mg total) by mouth daily.      glycopyrrolate 1 MG Oral Tab Take 1 tablet (1 mg total) by mouth 3 (three) times daily. (Patient not taking: Reported on 7/17/2024) 90 tablet 1       Allergies:  Allergies   Allergen Reactions    Celebrex [Celecoxib] PALPITATIONS    Penicillins ITCHING and SWELLING    Metformin And Related UNKNOWN    Olmesartan UNKNOWN       ROS:       PHYSICAL EXAM:     Base Eye Exam       Visual Acuity (Snellen - Linear)         Right Left    Dist sc 20/25 -2 20/30    Dist ph sc 20/20 -1 20/25 +2    Near cc 20/20 20/20              Tonometry (Applanation, 2:02 PM)         Right Left    Pressure 14 16              Pachymetry (7/30/2022)         Right Left    Thickness 574/-2 565/-1              Pupils         Pupils    Right PERRL    Left PERRL              Visual Fields         Left Right     Full Full              Extraocular Movement         Right Left     Full Full              Dilation       Both eyes: 1.0% Mydriacyl and 2.5% Alvin Synephrine @ 2:03 PM              Dilation #2       Both eyes: 1.0% Mydriacyl and 2.5% Alvin Synephrine @ 2:03 PM                  Additional Tests       Amsler         Right Left     Normal Normal              Color         Right Left    Ishihara 8/8 8/8                  Slit Lamp and Fundus Exam       Slit Lamp Exam         Right Left    Lids/Lashes Dermatochalasis, Meibomian gland dysfunction, extropion RLL Dermatochalasis, Meibomian gland dysfunction    Conjunctiva/Sclera Normal Conjunctivochalasis    Cornea 2+ arcus 2+ arcus    Anterior Chamber Deep and quiet Deep and quiet    Iris Normal Normal    Lens PC IOL slightly decentered temporally with YAG PC IOL with  YAG    Vitreous Vitreous floaters Vitreous floaters              Fundus Exam         Right Left    Disc Sloping margin, Temporal crescent Sloping margin, Temporal crescent    C/D Ratio 0.8 0.85    Macula Normal-no BDR, no Plaquenil Toxicity Normal-no BDR, no Plaquenil  Toxicity    Vessels Normal Normal    Periphery Normal Normal                  Lacrimal Exam       Schirmers         Right Left     3 mm 2 mm      Anesthesia: Yes                  Refraction       Wearing Rx         Sphere Cylinder    Right +2.00 Sphere    Left +2.00 Sphere      Type: Forgot OTC reading only              Manifest Refraction    Declines refraction, states will get her glasses Rx where her vision insurance is covered.                    ASSESSMENT/PLAN:     Diagnoses and Plan:     Type 2 diabetes mellitus without retinopathy (HCC)  Diabetes type II: no background of retinopathy, no signs of neovascularization noted.  Discussed ocular and systemic benefits of blood sugar control.  Diagnosis and treatment discussed in detail with patient.    Glaucoma suspect of both eyes  Discussed with patient that she is a glaucoma suspect based on increased cupping of the optic nerves in both eyes.   Retinal photos taken today to document optic nerves.  Glaucoma diagnostic testing ordered.  Will not start medication, but will continue to observe.  Patient verbalized understanding.    Will see patient for next available visual field, and OCT with no MD, if glaucoma diagnostics are normal will see patient in 1 year for a diabetic/Plaquenil  exam        High risk medication use   No evidence of plaquenil toxicity in either eye.  Will follow in 1 year for a plaquenil eye exam based on current guidelines.   Patient is approved to continue on plaquenil as directed by their PCP or rheumatologist.     Pseudophakia of both eyes  No treatment  Continue with OTC reading glasses    Vitreous floaters of both eyes   There is no evidence of retinal pathology.  All signs and symptoms of retinal detachment/tears explained in detail.    Patient instructed to call the office if they experience increase in floaters, increase in flashes of light, loss of vision or curtain or veil effect.       Dry eye syndrome of both eyes  Schirmer's  test today revealed dry eyes.  Patient was instructed to use warm compresses to the eyelids twice a day everyday.    Instructions for warm compress use:   Patient should place wash compresses on both eyelids for 5 minutes every morning and every night.  After 5 minutes of holding the warm compresses on the eyelids, patient should gently rub the eyelashes and then rinse thoroughly with warm water.   Patient should also use artificial tears (any over the counter brand is okay) up to 4-6  times per day as needed for dry eye symptoms.      Orders Placed This Encounter   Procedures    Fundus Photos - OU - Both Eyes    OCT, Optic Nerve - OU - Both Eyes    Smith Visual Field - OU - Both Eyes       Meds This Visit:  Requested Prescriptions      No prescriptions requested or ordered in this encounter        Follow up instructions:  Return for Next Avail VF, OCT with no MD, If glaucoma diagnostics are wnl, 1 year diab/plaquenil EE.    7/17/2024  Scribed by: Quinton Tesfaye MD

## 2024-07-17 NOTE — ASSESSMENT & PLAN NOTE
No evidence of plaquenil toxicity in either eye.  Will follow in 1 year for a plaquenil eye exam based on current guidelines.   Patient is approved to continue on plaquenil as directed by their PCP or rheumatologist.

## 2024-07-17 NOTE — ASSESSMENT & PLAN NOTE
Discussed with patient that she is a glaucoma suspect based on increased cupping of the optic nerves in both eyes.   Retinal photos taken today to document optic nerves.  Glaucoma diagnostic testing ordered.  Will not start medication, but will continue to observe.  Patient verbalized understanding.    Will see patient for next available visual field, and OCT with no MD, if glaucoma diagnostics are normal will see patient in 1 year for a diabetic/Plaquenil  exam

## 2024-07-17 NOTE — ASSESSMENT & PLAN NOTE
Diabetes type II: no background of retinopathy, no signs of neovascularization noted.  Discussed ocular and systemic benefits of blood sugar control.  Diagnosis and treatment discussed in detail with patient.

## 2024-07-19 ENCOUNTER — OFFICE VISIT (OUTPATIENT)
Dept: ENDOCRINOLOGY CLINIC | Facility: CLINIC | Age: 84
End: 2024-07-19

## 2024-07-19 VITALS
BODY MASS INDEX: 26.51 KG/M2 | WEIGHT: 140.38 LBS | SYSTOLIC BLOOD PRESSURE: 118 MMHG | OXYGEN SATURATION: 92 % | HEIGHT: 61 IN | DIASTOLIC BLOOD PRESSURE: 72 MMHG

## 2024-07-19 DIAGNOSIS — E11.649 UNCONTROLLED TYPE 2 DIABETES MELLITUS WITH HYPOGLYCEMIA WITHOUT COMA, WITHOUT LONG-TERM CURRENT USE OF INSULIN (HCC): Primary | ICD-10-CM

## 2024-07-19 LAB
GLUCOSE BLOOD: 153
HEMOGLOBIN A1C: 10.3 % (ref 4.3–5.6)
TEST STRIP LOT #: NORMAL NUMERIC

## 2024-07-19 PROCEDURE — 1159F MED LIST DOCD IN RCRD: CPT | Performed by: NURSE PRACTITIONER

## 2024-07-19 PROCEDURE — 3008F BODY MASS INDEX DOCD: CPT | Performed by: NURSE PRACTITIONER

## 2024-07-19 PROCEDURE — 99214 OFFICE O/P EST MOD 30 MIN: CPT | Performed by: NURSE PRACTITIONER

## 2024-07-19 PROCEDURE — 82947 ASSAY GLUCOSE BLOOD QUANT: CPT | Performed by: NURSE PRACTITIONER

## 2024-07-19 PROCEDURE — 3078F DIAST BP <80 MM HG: CPT | Performed by: NURSE PRACTITIONER

## 2024-07-19 PROCEDURE — 3074F SYST BP LT 130 MM HG: CPT | Performed by: NURSE PRACTITIONER

## 2024-07-19 PROCEDURE — 83036 HEMOGLOBIN GLYCOSYLATED A1C: CPT | Performed by: NURSE PRACTITIONER

## 2024-07-19 PROCEDURE — 95251 CONT GLUC MNTR ANALYSIS I&R: CPT | Performed by: NURSE PRACTITIONER

## 2024-07-19 RX ORDER — GLUCAGON INJECTION, SOLUTION 1 MG/.2ML
1 INJECTION, SOLUTION SUBCUTANEOUS ONCE AS NEEDED
Qty: 1 EACH | Refills: 0 | Status: SHIPPED | OUTPATIENT
Start: 2024-07-19 | End: 2024-07-19

## 2024-07-19 RX ORDER — MONTELUKAST SODIUM 10 MG/1
10 TABLET ORAL NIGHTLY
Qty: 90 TABLET | Refills: 3 | Status: SHIPPED | OUTPATIENT
Start: 2024-07-19

## 2024-07-19 NOTE — TELEPHONE ENCOUNTER
Please review; protocol failed/No Protocol    Requested Prescriptions   Pending Prescriptions Disp Refills    MONTELUKAST 10 MG Oral Tab [Pharmacy Med Name: MONTELUKAST SOD 10 MG TABLET] 90 tablet 3     Sig: TAKE 1 TABLET BY MOUTH EVERY DAY AT NIGHT       Asthma & COPD Medication Protocol Failed - 7/17/2024 12:12 AM        Failed - Asthma Action Score greater than or equal to 20        Failed - AAP/ACT given in last 12 months     No data recorded  No data recorded  No data recorded  No data recorded          Passed - Appointment in past 6 or next 3 months      Recent Outpatient Visits              Today     [facility] Vic Thakkar APRN    Office Visit    2 days ago Type 2 diabetes mellitus without retinopathy ([facility])    [facility] Quinton Tesfaye MD    Office Visit    1 week ago DARWIN (acute kidney injury) ([facility])    [facility] Hannah Encinas MD    Telemedicine    1 week ago Encounter for care related to vascular access port    [place] JOHN [facility] - Infusion    Nurse Only    2 weeks ago Uncontrolled type 2 diabetes mellitus with hyperglycemia ([facility])    [facility]    Nurse Only          Future Appointments         Provider Department Appt Notes    In 1 week [facility] OPTHA TECH [facility] EP/ VF+OCT with no MD    In 2 weeks EM CC LAB2 [facility] - Infusion -OCTREOTIDE-CBC-FERRITIN-IRON-PORT-cl    In 2 weeks Eric Mohamud MD [facility] Hematology Oncology 2 m f/u.md    In 2 weeks Hannah Encinas MD [facility]     In 1 month Vic Thakkar APRN [facility]     In 2 months Chapin Parker MD [facility] yearly    In 2 months Elena  Chapin THIBODEAUX MD [facility] 4 - 6 mos                       Future Appointments         Provider Department Appt Notes    In 1 week [facility] OPTHA TECH [facility] EP/ VF+OCT with no MD    In 2 weeks EM CC LAB2 Bridget LOPEZ [facility] - Infusion -OCTREOTIDE-CBC-FERRITIN-IRON-PORT-cl    In 2 weeks Eric Mohamud MD [facility] Hematology Oncology 2 m f/u.md    In 2 weeks Hannah Encinas MD [facility]     In 1 month Vic Thakkar APRN [facility]     In 2 months Chapin Parker MD [facility] yearly    In 2 months Chapin Parker MD [facility] 4 - 6 mos          Recent Outpatient Visits              Today     [facility] Vic Thakkar APRN    Office Visit    2 days ago Type 2 diabetes mellitus without retinopathy (HCC)    [facility] Quinton Tesfaye MD    Office Visit    1 week ago DARWIN (acute kidney injury) (HCC)    [facility] Hannah Encinas MD    Telemedicine    1 week ago Encounter for care related to vascular access port    [facility] - Infusion    Nurse Only    2 weeks ago Uncontrolled type 2 diabetes mellitus with hyperglycemia (HCC)    [facility]    Nurse Only

## 2024-07-19 NOTE — PATIENT INSTRUCTIONS
A1C: 10.3% today --> previously was 9.0% on 5/21/2024  Blood glucose: 153 in clinic today    Medications:   -decrease 70/30 insulin to 10 units with first meal     - increase protein with each meal   - meats/fish   - eggs   - greek yogurt/high protein yogurt: \"light and fit\" or \"two good\"   -cottage cheese   - protein shakes: glucerna or ensure protein max or premier or orgain or fair life   - peanut butter   - nuts/seeds   - Humus     A1C goal:  <7.5%    Blood sugar testing:  Continue using Freestyle van 3 continuous glucometer     Blood sugar targets:  Before breakfast:  (preferably < 110)  Before meals OR 2 hours after meals: <180 (preferably <150)     Call for persistent blood sugars < 75 or > 200

## 2024-07-19 NOTE — PROGRESS NOTES
Name: Chester Bautista  Date: 7/19/2024    CHIEF COMPLAINT   Chief Complaint   Patient presents with    Diabetes     Follow-up and A1c check    Hyperthyroidism     HISTORY OF PRESENT ILLNESS   Chester Bautista is a 84 year old female who presents for follow up on diabetes management. Patient is accompanied by her daughter Tommy today.   HbA1C: 10.3% at POC today. This is increased from 9.0% on 5/21/2024.   Blood glucose is: 153 in clinic today.   Since last office visit patient has transitioned to 70/30 insulin therapy once daily with first meal. She now feels more confident administering insulin injection (without needle bending) and has been using Freestyle van 3 CGM to help her closely monitor her glucose readings.   She continues to eat meals/carbs per usual and recently has started exercising right after first meal.     FAMILY HISTORY OF DIABETES  -paternal grandmother and paternal aunt.   DIABETES HISTORY  Diagnosed: had prediabetes since 2003; diagnosed with T2DM 11/7/21;   Prior HbA, C or glycohemoglobin were 7.2% on 11/7/2021;  7.7% 2/26/2022; 7.3% 5/2/2022; 6.0% 9/1/2022; 6.4% 3/21/2023; 8.1% 6/29/2023; 7.0% 10/18/2023; 9.5% on 2/2/2024; 9.0% 5/21/2024; 10.3% at POC today;     PREVIOUS MEDICATION FOR DM:  Metformin -d/c'ed due to allergic reaction - swelling to bottom of lip and tongue itching; also vomiting.   -Glipizide -d/c'ed due to transition to insulin therapy     CURRENT MEDICATIONS FOR DM:  70/30 insulin: 22 units with first meal of the day     HOME GLUCOSE READINGS:   Freestyle van 3 CGM download reviewed with patient. The results revealed (7/6 to 7/19):    Average glucose: 286 mg/dl     In target range: (70-180mg/dl): 11%     High glucose targets: (> 180mg/dl): 20%     Very high glucose targets: (> 250mg/dl): 68%     Low glucose targets: (less than 70mg/dl): 1%     Very Low glucose targets: (< 54mg/dl ): 0%     Glucose Management Indicator (GMI): 10.2%  Glucose Variability:  30.3%    Continuous Glucose Monitoring Interpretation    Chester Bautista has undergone continuous glucose monitoring with the personal Freestyle van 3 continuous glucose monitor.  The blood glucose tracings were evaluated for two weeks prior to office visit.  Her blood glucose tracings demonstrated postprandial hyperglycemia after dinner meal and overnight. She did experience hypoglycemia during the week of evaluation most often occurring between 2-4pm.  As a result of her testing her medications were adjusted per below.     HISTORY OF DIABETES COMPLICATIONS:  History of Retinopathy: no  - last eye exam within the last 12 months: yes  History of Neuropathy: no   History of Nephropathy: no     ASSOCIATED COMPLICATIONS:   HTN: yes   Hyperlipidemia: yes   Cardiovascular Disease: no   Peripheral Vascular Disease: no     DIETARY COMPLIANCE:  First meal around 11-12pm   -  cereal with milk with banana or oatmeal with 1 slice of whole wheat toast and slice of shipman; or grits and eggs; occasionally drinking apple juice  Second meal around 4pm -- often has to remind her self as she may forget to eat (not usually hungry).     + sweets/snacks after dinner meal/ later at night      EXERCISE:   Yes - daily after first meal of the day; started this recently     Polyuria, polyphagia, polydipsia: no   Paresthesias: no   Blurred vision: no   Recent steroids, illness or infections: yes  - on prednisone for pain - RA; has been taking since 7-9/2021    REVIEW OF SYSTEMS  Constitutional: Negative for: weight change, fever, fatigue, cold/heat intolerance  Eyes: Negative for:  Visual changes, proptosis, blurring  ENT: Negative for:  dysphagia, neck swelling, dysphonia  Respiratory: Negative for: hemoptysis, shortness of breath, cough, or dyspnea.  Cardiovascular: Negative for:  chest pain, chest discomfort, palpitations  GI: Negative for:  abdominal pain, nausea, vomiting, diarrhea, heartburn, constipation  Neurology: Negative  for: headache, dizziness, syncope, numbness/tingling, or weakness.   Genito-Urinary: Negative for: dysuria, frequency or hematuria   Hematology/Lymphatics: Negative for: bruising, easy bleeding, lower extremity edema  Skin: Negative for: rash, blister, infection or ulcers.  Endocrine: Negative for: polyuria, polydipsia. No osteoporosis. No thyroid disease.     MEDICATIONS:     Current Outpatient Medications:     Potassium Chloride ER 20 MEQ Oral Tab CR, Take 40 mEq by mouth every morning., Disp: 6 tablet, Rfl: 0    BD PEN NEEDLE KADEN 2ND GEN 32G X 4 MM Does not apply Misc, 1 EACH DAILY. USE DAILY WITH INSULIN, Disp: , Rfl:     OMEPRAZOLE 20 MG Oral Capsule Delayed Release, TAKE 1 CAPSULE BY MOUTH EVERY DAY IN THE MORNING, Disp: 90 capsule, Rfl: 1    torsemide 20 MG Oral Tab, Take 1 tablet (20 mg total) by mouth daily., Disp: , Rfl:     Insulin Aspart Prot & Aspart (INSULIN ASP PROT & ASP FLEXPEN) (70-30) 100 UNIT/ML Subcutaneous Suspension Pen-injector, Inject 12 Units into the skin daily with lunch. (Patient taking differently: Inject 24 Units into the skin daily with lunch.), Disp: 9 mL, Rfl: 0    Insulin Pen Needle 33G X 4 MM Does not apply Misc, 1 each daily. Use daily with insulin, Disp: 100 each, Rfl: 0    Alcohol Swabs (ALCOHOL PREP) Does not apply Pads, 1 each daily., Disp: 100 each, Rfl: 0    glipiZIDE 5 MG Oral Tab, Take 2 tablets (10 mg total) by mouth before breakfast., Disp: 180 tablet, Rfl: 0    valsartan 80 MG Oral Tab, Take 1 tablet (80 mg total) by mouth daily., Disp: 90 tablet, Rfl: 3    cyclobenzaprine 10 MG Oral Tab, Take 1 tablet (10 mg total) by mouth nightly as needed., Disp: 90 tablet, Rfl: 1    tamsulosin 0.4 MG Oral Cap, Take 1 capsule (0.4 mg total) by mouth daily., Disp: 90 capsule, Rfl: 1    carvedilol 12.5 MG Oral Tab, Take 1 tablet (12.5 mg total) by mouth 2 (two) times daily with meals., Disp: 180 tablet, Rfl: 3    predniSONE 5 MG Oral Tab, Take 1 tablet (5 mg total) by mouth daily.,  Disp: 90 tablet, Rfl: 1    fluticasone-salmeterol (WIXELA INHUB) 250-50 MCG/ACT Inhalation Aerosol Powder, Breath Activated, Inhale 1 puff into the lungs every 12 (twelve) hours., Disp: 1 each, Rfl: 5    Lancets Does not apply Misc, Dx. E11.9. Checks qam., Disp: 50 each, Rfl: 6    Blood Glucose Monitoring Suppl (BLOOD GLUCOSE SYSTEM GILLES) Does not apply Kit, Dx. E11.9. Checks qam., Disp: 1 kit, Rfl: 0    Blood Glucose Monitoring Suppl w/Device Does not apply Kit, Dx. E11.9. Checks qam., Disp: 1 kit, Rfl: 0    Blood Gluc Meter Disp-Strips Does not apply Device, Dx. E11.9. Checks qam., Disp: 100 each, Rfl: 0    gabapentin 100 MG Oral Cap, Take 1 capsule (100 mg total) by mouth nightly., Disp: 90 capsule, Rfl: 3    midodrine 2.5 MG Oral Tab, Take 1 tablet (2.5 mg total) by mouth in the morning and 1 tablet (2.5 mg total) at noon and 1 tablet (2.5 mg total) in the evening. Hold if BP > 120/80.., Disp: 90 tablet, Rfl: 0    LORATADINE 10 MG Oral Tab, TAKE 1 TABLET BY MOUTH EVERY DAY, Disp: 90 tablet, Rfl: 1    Accu-Chek FastClix Lancets Does not apply Misc, Check sugar once daily as directed (E11.42), Disp: 100 each, Rfl: 2    hydroxychloroquine 200 MG Oral Tab, Take 1 tablet (200 mg total) by mouth daily., Disp: 90 tablet, Rfl: 1    Glucose Blood (ACCU-CHEK GUIDE) In Vitro Strip, Check blood sugar twice a day (fasting and before dinner). DX: E11.42, NIDDM, Disp: 200 strip, Rfl: 1    rosuvastatin (CRESTOR) 20 MG Oral Tab, Take 1 tablet (20 mg total) by mouth nightly., Disp: 90 tablet, Rfl: 0    glycopyrrolate 1 MG Oral Tab, Take 1 tablet (1 mg total) by mouth 3 (three) times daily. (Patient not taking: Reported on 7/17/2024), Disp: 90 tablet, Rfl: 1    albuterol 108 (90 Base) MCG/ACT Inhalation Aero Soln, inhale 2 puff by inhalation route  every 4 - 6 hours as needed, Disp: 1 each, Rfl: 0    montelukast 10 MG Oral Tab, Take 1 tablet (10 mg total) by mouth nightly., Disp: 90 tablet, Rfl: 3    albuterol 108 (90 Base) MCG/ACT  Inhalation Aero Soln, inhale 2 puff by inhalation route  every 4 - 6 hours as needed, Disp: 1 each, Rfl: 5    Blood Glucose Monitoring Suppl (ONETOUCH VERIO) w/Device Does not apply Kit, 1 each daily. Test 1x daily, Disp: 1 kit, Rfl: 0    Glucose Blood (ONETOUCH VERIO) In Vitro Strip, Test 1x daily, Disp: 100 strip, Rfl: 1    OneTouch Delica Lancets 33G Does not apply Misc, 4 x daily, Disp: 100 each, Rfl: 1    Blood Glucose Monitoring Suppl (BLOOD GLUCOSE SYSTEM GILLES) Does not apply Kit, DX E 11.9.  Checks every morning., Disp: 1 kit, Rfl: 0    Lancets Does not apply Misc, DX E 11.9.  Checks every morning., Disp: 50 each, Rfl: 11    Blood Glucose Monitoring Suppl w/Device Does not apply Kit, DX E 11.9.  Checks every morning., Disp: 1 kit, Rfl: 0    Blood Gluc Meter Disp-Strips Does not apply Device, DX E 11.9.  Checks every morning., Disp: 100 each, Rfl: 11    acetaminophen 500 MG Oral Tab, Take 1 tablet (500 mg total) by mouth daily., Disp: , Rfl:     ALLERGIES:   Allergies   Allergen Reactions    Celebrex [Celecoxib] PALPITATIONS    Penicillins ITCHING and SWELLING    Metformin And Related UNKNOWN    Olmesartan UNKNOWN       SOCIAL HISTORY:   Social History     Socioeconomic History    Marital status:    Tobacco Use    Smoking status: Former     Current packs/day: 0.00     Types: Cigarettes     Quit date:      Years since quittin.5     Passive exposure: Never    Smokeless tobacco: Never    Tobacco comments:     [id number]   Vaping Use    Vaping status: Never Used   Substance and Sexual Activity    Alcohol use: Never    Drug use: Never     PAST MEDICAL HISTORY:   Past Medical History:    Anxiety state    Cancer (HCC)    breast cancer 2018    Chronic obstructive pulmonary disease, unspecified (HCC)    Chronic obstructive pulmonary disease, unspecified (HCC)    COPD (chronic obstructive pulmonary disease) (HCC)    Diabetes (HCC)    Diabetes mellitus (HCC)    Essential hypertension    Exposure to medical  diagnostic radiation    High blood pressure    High cholesterol    History of blood transfusion    low hemoglobin    Osteoarthritis    PONV (postoperative nausea and vomiting)    Pulmonary embolism (HCC)    Pulmonary emphysema (HCC)    Rheumatoid arthritis (HCC)       PAST SURGICAL HISTORY:   Past Surgical History:   Procedure Laterality Date    Cataract extraction w/  intraocular lens implant Bilateral 2017    Dr in [facility]     Colonoscopy      Colonoscopy N/A 07/21/2022    Procedure: COLONOSCOPY;  Surgeon: Mikey Garcia MD;  Location: [facility] ENDOSCOPY    Colonoscopy N/A 06/15/2023    Procedure: COLONOSCOPY;  Surgeon: Mikey Garcia MD;  Location: [facility] ENDOSCOPY    Correct bunion,simple      right foot  1993    Egd  12/21/2023    Dr. Garcia; Duodenal AVM's x4 cauterized    Hysterectomy      1972, no abnormal Paps, due to heavy bleeding with fibroids.  Not sure if it had bilateral salpingo-oophorectomy.    Ir aneurysm repairm  2010    Computer assisted volumetric craniofomy with clipping of anterior cerebral artery.    Lumpectomy right      Radiation right      Rotator cuff repair      1993    Total knee replacement Right 2010    Total knee replacement Left 07/02/2015    Transoral incisionless fundoplication - internal  01/25/2012 Fredy fundoplication at [facility] Dr. Fournier.    Fredy fundoplication at [facility] Dr. Fournier.    Trigger finger release      left hand  2005    Yag capsulotomy - ou - both eyes Bilateral 09/2021    done in [place]       PHYSICAL EXAM:   Vitals:    07/19/24 1244   BP: 118/72   SpO2: 92%   Weight: 140 lb 6.4 oz (63.7 kg)   Height: 5' 1\" (1.549 m)       BMI:   Body mass index is 26.53 kg/m².    General Appearance:  alert, well developed, in no acute distress  Nutritional:  no extreme weight gain or loss  Head: Atraumatic  Eyes:  normal conjunctivae, sclera., normal sclera and normal pupils  Throat/Neck: normal sound to voice. Normal hearing, normal  speech  Back: no kyphosis  Respiratory:  Speaking in full sentences, non-labored. no increased work of breathing, no audible wheezing    Skin:  normal moisture and skin texture, no visible lesions  Hair and nails: normal scalp hair  Hematologic:  no excessive bruising  Neuro: motor grossly intact, moving all extremities without difficulty  Psychiatric:  oriented to time, self, and place  Extremities: no obvious extremity swelling, no lesions    LABS: Pertinent labs reviewed    ASSESSMENT/PLAN:    -Reviewed with patient the pathogenesis of diabetes, clinical significance of A1c, and common complications such as: microvascular, macrovascular and diabetic ketoacidosis. Patient verbalizes understanding of the importance of glycemic control and the goals of therapy.   Per ADA 2024 guidelines, it is recommended that older adults (age 65 and above) who are healthy with few coexisting chronic illnesses, intact cognitive and functional status, should have A1C goal of <7.5%, fasting or preprandial glucose of  and bedtime glucose of .     1.Type 2 Diabetes Mellitus, uncontrolled   -LAB DATA  HbA,C: 10.3% today   a) Medications  - decrease 70/30 insulin: 22 --> 10 units with first meal - Risks and benefits reviewed. Verbalizes understanding.  - rx for Gvoke glucagon was sent to the pharmacy today.     - suspect that current hypoglycemia after first meal is related to lack of protein, increased activity and higher insulin dose. Patient has also been overcorrecting hypoglycemic event which has lead to hyperglycemia later in the evening and overnight.   - reviewed rule of 15s with treating hypoglycemia today   - discussed to start include protein and high fiber veggies with all meals.     - reviewed 70/30 insulin action time and discussed that she must not skips meals as this can result to hypoglycemia. If we continue to struggle with eating meals at designated meal times, discussed to transition to RA insulin only.  Will re-evaluate this plan before next insulin refill.     -patient has given meal balanced handout for reference.     - patient was educated on how to use glucagon pen. Rx was sen to pharmacy.     -discussed to continue with exercise and staying active as much as safely able   -reviewed target goal BG readings and A1C  -reviewed when to call and notify me of abnormal BG readings.   - will follow up on BG readings again tomorrow  and communicate with patient if any dose changes need to be made at that time.     b) No nephropathy: GFR: 37 2024 and urine MA: 120.7 on 3/10/2023 -->reminded to repeat urine MA  c) UTD with optho - last apt with Dr. Melo was 2024  D)foot exam: abnormal on 2/15/2024  e) cont using freestyle van 3 cgm   f) Life style changes reviewed    2.dyslipidemia   -LDL: 102 and Tri on 2023  -Atorvastatin 80mg at bedtime      RTC in 6 weeks   Patient instructed to call sooner if they develop Blood glucose readings <75 and/or if they have readings persistently >200.     The risks and benefits of my recommendations were discussed with the patient today. questions were also answered to the best of my knowledge. Patient verbalizes understanding of these issues and agrees to the plan.    2024  BILL Swain

## 2024-07-22 NOTE — TELEPHONE ENCOUNTER
Aldo msg was sent to patient on 7/20 to adjust insulin dose to 14 units. It seems that she has not read this msg.    Please call patient/daughter and instruct to make this insulin dose adjustment.     Thank you!

## 2024-07-22 NOTE — TELEPHONE ENCOUNTER
Spoke to patient's daughter to relay message below - daughter stated understanding for patient to adjust insulin 70/30 to 14 units with first meal and will relay message to patient

## 2024-07-27 ENCOUNTER — NURSE ONLY (OUTPATIENT)
Dept: OPHTHALMOLOGY | Facility: CLINIC | Age: 84
End: 2024-07-27

## 2024-07-27 DIAGNOSIS — H40.003 GLAUCOMA SUSPECT OF BOTH EYES: ICD-10-CM

## 2024-07-27 PROCEDURE — 92133 CPTRZD OPH DX IMG PST SGM ON: CPT | Performed by: OPHTHALMOLOGY

## 2024-07-27 PROCEDURE — 92083 EXTENDED VISUAL FIELD XM: CPT | Performed by: OPHTHALMOLOGY

## 2024-07-27 PROCEDURE — 1160F RVW MEDS BY RX/DR IN RCRD: CPT | Performed by: OPHTHALMOLOGY

## 2024-07-27 PROCEDURE — 1159F MED LIST DOCD IN RCRD: CPT | Performed by: OPHTHALMOLOGY

## 2024-07-27 NOTE — PROGRESS NOTES
Chester Bautista is a 84 year old female.    HPI:     HPI    Patient is here for a glaucoma work up with HVJENNY and OCT, no M.D.  Last edited by Angela De Guzman on 7/27/2024 12:09 PM.        Patient History:  Past Medical History:    Anxiety state    Cancer (HCC)    breast cancer 2018    Chronic obstructive pulmonary disease, unspecified (HCC)    Chronic obstructive pulmonary disease, unspecified (HCC)    COPD (chronic obstructive pulmonary disease) (HCC)    Diabetes (HCC)    Diabetes mellitus (HCC)    Essential hypertension    Exposure to medical diagnostic radiation    High blood pressure    High cholesterol    History of blood transfusion    low hemoglobin    Osteoarthritis    PONV (postoperative nausea and vomiting)    Pulmonary embolism (HCC)    Pulmonary emphysema (HCC)    Rheumatoid arthritis (HCC)       Surgical History: Chester Bautista has a past surgical history that includes correct bunion,simple (right foot  1993); Trigger finger release (left hand  2005); hysterectomy (1972, no abnormal Paps, due to heavy bleeding with fibroids.  Not sure if it had bilateral salpingo-oophorectomy.); Rotator cuff repair (1993); ir aneurysm repairm (2010) (Computer assisted volumetric craniofomy with clipping of anterior cerebral artery.); total knee replacement (Right, 2010); transoral incisionless fundoplication - internal (01/25/2012 Fredy fundoplication at [facility] Dr. Fournier.) (Fredy fundoplication at [facility] Dr. Fournier.); total knee replacement (Left, 07/02/2015); colonoscopy; lumpectomy right; colonoscopy (N/A, 07/21/2022) (Procedure: COLONOSCOPY;  Surgeon: Mikey Garcia MD;  Location: [facility] ENDOSCOPY); Cataract extraction w/  intraocular lens implant (Bilateral, 2017) (Dr in [facility] ); Yag Capsulotomy - OU - Both Eyes (Bilateral, 09/2021) (done in [place]); radiation right; colonoscopy (N/A, 06/15/2023) (Procedure: COLONOSCOPY;  Surgeon: Mikey Garcia MD;   Location: [facility] ENDOSCOPY); and egd (2023) (Dr. Garcia; Duodenal AVM's x4 cauterized).    Family History   Problem Relation Age of Onset    Heart Surgery Mother         Leaky valve at 39 y/o.     Prostate Cancer Brother     Cancer Brother     Diabetes Maternal Grandmother     Diabetes Paternal Aunt     Breast Cancer Self 78    Breast Cancer Niece         before 50    Macular degeneration Neg     Glaucoma Neg        Social History:   Social History     Socioeconomic History    Marital status:    Tobacco Use    Smoking status: Former     Current packs/day: 0.00     Types: Cigarettes     Quit date:      Years since quittin.5     Passive exposure: Never    Smokeless tobacco: Never    Tobacco comments:     [id number]   Vaping Use    Vaping status: Never Used   Substance and Sexual Activity    Alcohol use: Never    Drug use: Never   Social History Narrative    Just moved in from [place].   passed away and that is why moved from [place] to be with daughter who lives in [place].  Currently lives with daughter.     Social Determinants of Health     Financial Resource Strain: Low Risk  (2024)    Financial Resource Strain     Difficulty of Paying Living Expenses: Not hard at all     Med Affordability: No   Food Insecurity: No Food Insecurity (2024)    Food Insecurity     Food Insecurity: Never true   Transportation Needs: No Transportation Needs (2024)    Transportation Needs     Lack of Transportation: No   Housing Stability: Low Risk  (2024)    Housing Stability     Housing Instability: No       Medications:  Current Outpatient Medications   Medication Sig Dispense Refill    montelukast 10 MG Oral Tab Take 1 tablet (10 mg total) by mouth nightly. 90 tablet 3    Potassium Chloride ER 20 MEQ Oral Tab CR Take 40 mEq by mouth every morning. 6 tablet 0    BD PEN NEEDLE KADEN 2ND GEN 32G X 4 MM Does not apply Misc 1 EACH DAILY. USE DAILY WITH INSULIN      OMEPRAZOLE 20 MG Oral  Capsule Delayed Release TAKE 1 CAPSULE BY MOUTH EVERY DAY IN THE MORNING 90 capsule 1    torsemide 20 MG Oral Tab Take 1 tablet (20 mg total) by mouth daily.      Insulin Aspart Prot & Aspart (INSULIN ASP PROT & ASP FLEXPEN) (70-30) 100 UNIT/ML Subcutaneous Suspension Pen-injector Inject 12 Units into the skin daily with lunch. (Patient taking differently: Inject 24 Units into the skin daily with lunch.) 9 mL 0    Insulin Pen Needle 33G X 4 MM Does not apply Misc 1 each daily. Use daily with insulin 100 each 0    Alcohol Swabs (ALCOHOL PREP) Does not apply Pads 1 each daily. 100 each 0    glipiZIDE 5 MG Oral Tab Take 2 tablets (10 mg total) by mouth before breakfast. (Patient not taking: Reported on 7/19/2024) 180 tablet 0    valsartan 80 MG Oral Tab Take 1 tablet (80 mg total) by mouth daily. 90 tablet 3    cyclobenzaprine 10 MG Oral Tab Take 1 tablet (10 mg total) by mouth nightly as needed. 90 tablet 1    tamsulosin 0.4 MG Oral Cap Take 1 capsule (0.4 mg total) by mouth daily. 90 capsule 1    carvedilol 12.5 MG Oral Tab Take 1 tablet (12.5 mg total) by mouth 2 (two) times daily with meals. 180 tablet 3    predniSONE 5 MG Oral Tab Take 1 tablet (5 mg total) by mouth daily. 90 tablet 1    fluticasone-salmeterol (WIXELA INHUB) 250-50 MCG/ACT Inhalation Aerosol Powder, Breath Activated Inhale 1 puff into the lungs every 12 (twelve) hours. 1 each 5    Lancets Does not apply Misc Dx. E11.9. Checks qam. 50 each 6    Blood Glucose Monitoring Suppl (BLOOD GLUCOSE SYSTEM GILLES) Does not apply Kit Dx. E11.9. Checks qam. 1 kit 0    Blood Glucose Monitoring Suppl w/Device Does not apply Kit Dx. E11.9. Checks qam. 1 kit 0    Blood Gluc Meter Disp-Strips Does not apply Device Dx. E11.9. Checks qam. 100 each 0    gabapentin 100 MG Oral Cap Take 1 capsule (100 mg total) by mouth nightly. 90 capsule 3    midodrine 2.5 MG Oral Tab Take 1 tablet (2.5 mg total) by mouth in the morning and 1 tablet (2.5 mg total) at noon and 1 tablet  (2.5 mg total) in the evening. Hold if BP > 120/80.. 90 tablet 0    LORATADINE 10 MG Oral Tab TAKE 1 TABLET BY MOUTH EVERY DAY 90 tablet 1    Accu-Chek FastClix Lancets Does not apply Misc Check sugar once daily as directed (E11.42) 100 each 2    hydroxychloroquine 200 MG Oral Tab Take 1 tablet (200 mg total) by mouth daily. 90 tablet 1    Glucose Blood (ACCU-CHEK GUIDE) In Vitro Strip Check blood sugar twice a day (fasting and before dinner). DX: E11.42, NIDDM 200 strip 1    rosuvastatin (CRESTOR) 20 MG Oral Tab Take 1 tablet (20 mg total) by mouth nightly. 90 tablet 0    glycopyrrolate 1 MG Oral Tab Take 1 tablet (1 mg total) by mouth 3 (three) times daily. (Patient not taking: Reported on 7/17/2024) 90 tablet 1    albuterol 108 (90 Base) MCG/ACT Inhalation Aero Soln inhale 2 puff by inhalation route  every 4 - 6 hours as needed 1 each 0    albuterol 108 (90 Base) MCG/ACT Inhalation Aero Soln inhale 2 puff by inhalation route  every 4 - 6 hours as needed 1 each 5    Blood Glucose Monitoring Suppl (ONETOUCH VERIO) w/Device Does not apply Kit 1 each daily. Test 1x daily 1 kit 0    Glucose Blood (ONETOUCH VERIO) In Vitro Strip Test 1x daily 100 strip 1    OneTouch Delica Lancets 33G Does not apply Misc 4 x daily 100 each 1    Blood Glucose Monitoring Suppl (BLOOD GLUCOSE SYSTEM GLILES) Does not apply Kit DX E 11.9.  Checks every morning. 1 kit 0    Lancets Does not apply Misc DX E 11.9.  Checks every morning. 50 each 11    Blood Glucose Monitoring Suppl w/Device Does not apply Kit DX E 11.9.  Checks every morning. 1 kit 0    Blood Gluc Meter Disp-Strips Does not apply Device DX E 11.9.  Checks every morning. 100 each 11    acetaminophen 500 MG Oral Tab Take 1 tablet (500 mg total) by mouth daily.         Allergies:  Allergies   Allergen Reactions    Celebrex [Celecoxib] PALPITATIONS    Penicillins ITCHING and SWELLING    Metformin And Related UNKNOWN    Olmesartan UNKNOWN       ROS:       PHYSICAL EXAM:   Not recorded           ASSESSMENT/PLAN:     Diagnoses and Plan:     Glaucoma suspect of both eyes  Normal visual field, both eyes.  Normal OCT, both eyes.      No orders of the defined types were placed in this encounter.      Meds This Visit:  Requested Prescriptions      No prescriptions requested or ordered in this encounter        Follow up instructions:  Return in about 1 year at [facility] for Diabetic dilated eye exam and Plaquenil eye exam.    7/27/2024  Scribed by: Quinton Tesfaye MD

## 2024-08-01 DIAGNOSIS — K90.9 MALABSORPTION OF IRON (HCC): ICD-10-CM

## 2024-08-01 DIAGNOSIS — D50.0 IRON DEFICIENCY ANEMIA DUE TO CHRONIC BLOOD LOSS: ICD-10-CM

## 2024-08-01 DIAGNOSIS — D50.9 IRON DEFICIENCY ANEMIA, UNSPECIFIED IRON DEFICIENCY ANEMIA TYPE: Primary | ICD-10-CM

## 2024-08-01 DIAGNOSIS — Q27.30 AVM (ARTERIOVENOUS MALFORMATION) (HCC): ICD-10-CM

## 2024-08-02 ENCOUNTER — OFFICE VISIT (OUTPATIENT)
Dept: HEMATOLOGY/ONCOLOGY | Facility: HOSPITAL | Age: 84
End: 2024-08-02
Attending: INTERNAL MEDICINE

## 2024-08-02 ENCOUNTER — NURSE ONLY (OUTPATIENT)
Dept: HEMATOLOGY/ONCOLOGY | Facility: HOSPITAL | Age: 84
End: 2024-08-02
Attending: INTERNAL MEDICINE
Payer: MEDICARE

## 2024-08-02 VITALS
WEIGHT: 141 LBS | HEIGHT: 61 IN | TEMPERATURE: 98 F | OXYGEN SATURATION: 100 % | RESPIRATION RATE: 16 BRPM | SYSTOLIC BLOOD PRESSURE: 184 MMHG | DIASTOLIC BLOOD PRESSURE: 77 MMHG | HEART RATE: 76 BPM | BODY MASS INDEX: 26.62 KG/M2

## 2024-08-02 DIAGNOSIS — Z86.711 HISTORY OF PULMONARY EMBOLISM: ICD-10-CM

## 2024-08-02 DIAGNOSIS — I10 HYPERTENSION, UNSPECIFIED TYPE: ICD-10-CM

## 2024-08-02 DIAGNOSIS — Z45.2 ENCOUNTER FOR CARE RELATED TO VASCULAR ACCESS PORT: Primary | ICD-10-CM

## 2024-08-02 DIAGNOSIS — Z86.2 HISTORY OF IRON DEFICIENCY ANEMIA: Primary | ICD-10-CM

## 2024-08-02 DIAGNOSIS — D50.0 IRON DEFICIENCY ANEMIA DUE TO CHRONIC BLOOD LOSS: ICD-10-CM

## 2024-08-02 DIAGNOSIS — K90.9 MALABSORPTION OF IRON (HCC): ICD-10-CM

## 2024-08-02 DIAGNOSIS — Q27.30 AVM (ARTERIOVENOUS MALFORMATION) (HCC): ICD-10-CM

## 2024-08-02 DIAGNOSIS — E83.42 HYPOMAGNESEMIA: ICD-10-CM

## 2024-08-02 LAB
ANION GAP SERPL CALC-SCNC: 6 MMOL/L (ref 0–18)
BASOPHILS # BLD AUTO: 0.04 X10(3) UL (ref 0–0.2)
BASOPHILS NFR BLD AUTO: 0.5 %
BUN BLD-MCNC: 21 MG/DL (ref 9–23)
BUN/CREAT SERPL: 19.6 (ref 10–20)
CALCIUM BLD-MCNC: 9.2 MG/DL (ref 8.7–10.4)
CHLORIDE SERPL-SCNC: 105 MMOL/L (ref 98–112)
CO2 SERPL-SCNC: 26 MMOL/L (ref 21–32)
CREAT BLD-MCNC: 1.07 MG/DL
DEPRECATED HBV CORE AB SER IA-ACNC: 78.7 NG/ML
DEPRECATED RDW RBC AUTO: 49.2 FL (ref 35.1–46.3)
EGFRCR SERPLBLD CKD-EPI 2021: 51 ML/MIN/1.73M2 (ref 60–?)
EOSINOPHIL # BLD AUTO: 0.15 X10(3) UL (ref 0–0.7)
EOSINOPHIL NFR BLD AUTO: 1.8 %
ERYTHROCYTE [DISTWIDTH] IN BLOOD BY AUTOMATED COUNT: 14.8 % (ref 11–15)
GLUCOSE BLD-MCNC: 134 MG/DL (ref 70–99)
HCT VFR BLD AUTO: 33.1 %
HGB BLD-MCNC: 10.1 G/DL
IMM GRANULOCYTES # BLD AUTO: 0.08 X10(3) UL (ref 0–1)
IMM GRANULOCYTES NFR BLD: 1 %
IRON SATN MFR SERPL: 13 %
IRON SERPL-MCNC: 40 UG/DL
LYMPHOCYTES # BLD AUTO: 0.97 X10(3) UL (ref 1–4)
LYMPHOCYTES NFR BLD AUTO: 11.6 %
MAGNESIUM SERPL-MCNC: 1.8 MG/DL (ref 1.6–2.6)
MCH RBC QN AUTO: 27.7 PG (ref 26–34)
MCHC RBC AUTO-ENTMCNC: 30.5 G/DL (ref 31–37)
MCV RBC AUTO: 90.9 FL
MONOCYTES # BLD AUTO: 0.61 X10(3) UL (ref 0.1–1)
MONOCYTES NFR BLD AUTO: 7.3 %
NEUTROPHILS # BLD AUTO: 6.51 X10 (3) UL (ref 1.5–7.7)
NEUTROPHILS # BLD AUTO: 6.51 X10(3) UL (ref 1.5–7.7)
NEUTROPHILS NFR BLD AUTO: 77.8 %
OSMOLALITY SERPL CALC.SUM OF ELEC: 289 MOSM/KG (ref 275–295)
PLATELET # BLD AUTO: 299 10(3)UL (ref 150–450)
POTASSIUM SERPL-SCNC: 3.9 MMOL/L (ref 3.5–5.1)
RBC # BLD AUTO: 3.64 X10(6)UL
SODIUM SERPL-SCNC: 137 MMOL/L (ref 136–145)
TIBC SERPL-MCNC: 297 UG/DL (ref 250–425)
TRANSFERRIN SERPL-MCNC: 199 MG/DL (ref 250–380)
WBC # BLD AUTO: 8.4 X10(3) UL (ref 4–11)

## 2024-08-02 PROCEDURE — 82728 ASSAY OF FERRITIN: CPT

## 2024-08-02 PROCEDURE — 84466 ASSAY OF TRANSFERRIN: CPT

## 2024-08-02 PROCEDURE — 99214 OFFICE O/P EST MOD 30 MIN: CPT | Performed by: INTERNAL MEDICINE

## 2024-08-02 PROCEDURE — 80048 BASIC METABOLIC PNL TOTAL CA: CPT

## 2024-08-02 PROCEDURE — 83735 ASSAY OF MAGNESIUM: CPT

## 2024-08-02 PROCEDURE — 85025 COMPLETE CBC W/AUTO DIFF WBC: CPT

## 2024-08-02 PROCEDURE — 36593 DECLOT VASCULAR DEVICE: CPT

## 2024-08-02 PROCEDURE — 83540 ASSAY OF IRON: CPT

## 2024-08-02 PROCEDURE — 96372 THER/PROPH/DIAG INJ SC/IM: CPT

## 2024-08-02 RX ORDER — HEPARIN SODIUM (PORCINE) LOCK FLUSH IV SOLN 100 UNIT/ML 100 UNIT/ML
5 SOLUTION INTRAVENOUS ONCE
OUTPATIENT
Start: 2024-08-30

## 2024-08-02 RX ORDER — SODIUM CHLORIDE 9 MG/ML
10 INJECTION, SOLUTION INTRAMUSCULAR; INTRAVENOUS; SUBCUTANEOUS ONCE
OUTPATIENT
Start: 2024-08-30

## 2024-08-02 NOTE — PROGRESS NOTES
Pt here for labs/octreotide inj.  Unable to withdraw blood further than end of port tubing.  Cathflo instilled.  Attempted withdrawal after 40 minutes without success.  Left in for an additional 45 minutes.  Was still unable to withdraw cathflo.  Flushed w/ numerous saline flushes and was finally able to waste and draw labs with pt reclined in chair.  Flushed well and deaccessed.

## 2024-08-02 NOTE — PROGRESS NOTES
Cancer Center Progress Note    Patient Name: Chester Bautista   YOB: 1940   Medical Record Number: [id number]     Chief Complaint:  BUZZ, hx of PE      Oncology History:  84 year old with chronic iron deficiency anemia in the setting of bleeding AVMs s/p repeat endoscopic treatment, Iv iron treatment and recent RBC transfusion for hgb of 6 on 5/2022. EGD/CLN Dr Garcia 7/2022 noted for 2 AVM in duodenum and 1 AVM in the colon s/p treatment.  There is ongoing concern for more AVMs as well need for chronic monitoring, infusions etc.      History is noted for RA managed by Dr Goodrich, hepatitis B core antibody positivity, and remote history of breast cancer (records not available).     Pt had an infusion 2/2023 injectafer.     5/19/23: reported to ED due to rectal bleeding and abd pain, has CLN planned.    In early 2/24 rec for ER eval in light of the critically HTN. She was treated for pneumonia. Prudencio was then found to have PE and GI bleeding. She was treated with IV heparin and required cauterization twice for GI bleeding, so never started on Apixaban and held from anticoagulation.    8/2/2024  Interim  Patient presents for f/u of BUZZ s/p receiving some IV iron in 4/24. She is on monthly octreotide by GI for GI bleeding concerns.     Prudencio's fatigue and dyspnea on exertion are stable, but no chest pain. Pt denies any systemic signs of illness.     Patient denies blood loss from any orifice that she can see. Prudencio underwent bidirectional scoping with Dr. Aundrea Garcia in GI in June 2023 were several areas of AVM were cauterized.        Past Medical History: history of breast cancer, BUZZ, bleeding AVM, diabetes, HTN, HLD, RA  Past Surgical History: Hysterectomy 1972, IR aneurysm repair, right lumpectomy, knee replacements  Family History: neg for malignancy  Social History: lives with naif, able to do all ADLs, no smoking or ETOH    Current Outpatient Medications:     montelukast 10 MG Oral  Tab, Take 1 tablet (10 mg total) by mouth nightly., Disp: 90 tablet, Rfl: 3    Potassium Chloride ER 20 MEQ Oral Tab CR, Take 40 mEq by mouth every morning., Disp: 6 tablet, Rfl: 0    BD PEN NEEDLE KADEN 2ND GEN 32G X 4 MM Does not apply Misc, 1 EACH DAILY. USE DAILY WITH INSULIN, Disp: , Rfl:     OMEPRAZOLE 20 MG Oral Capsule Delayed Release, TAKE 1 CAPSULE BY MOUTH EVERY DAY IN THE MORNING, Disp: 90 capsule, Rfl: 1    torsemide 20 MG Oral Tab, Take 1 tablet (20 mg total) by mouth daily., Disp: , Rfl:     Insulin Aspart Prot & Aspart (INSULIN ASP PROT & ASP FLEXPEN) (70-30) 100 UNIT/ML Subcutaneous Suspension Pen-injector, Inject 12 Units into the skin daily with lunch. (Patient taking differently: Inject 24 Units into the skin daily with lunch.), Disp: 9 mL, Rfl: 0    Insulin Pen Needle 33G X 4 MM Does not apply Misc, 1 each daily. Use daily with insulin, Disp: 100 each, Rfl: 0    Alcohol Swabs (ALCOHOL PREP) Does not apply Pads, 1 each daily., Disp: 100 each, Rfl: 0    glipiZIDE 5 MG Oral Tab, Take 2 tablets (10 mg total) by mouth before breakfast., Disp: 180 tablet, Rfl: 0    valsartan 80 MG Oral Tab, Take 1 tablet (80 mg total) by mouth daily., Disp: 90 tablet, Rfl: 3    cyclobenzaprine 10 MG Oral Tab, Take 1 tablet (10 mg total) by mouth nightly as needed., Disp: 90 tablet, Rfl: 1    tamsulosin 0.4 MG Oral Cap, Take 1 capsule (0.4 mg total) by mouth daily., Disp: 90 capsule, Rfl: 1    carvedilol 12.5 MG Oral Tab, Take 1 tablet (12.5 mg total) by mouth 2 (two) times daily with meals., Disp: 180 tablet, Rfl: 3    predniSONE 5 MG Oral Tab, Take 1 tablet (5 mg total) by mouth daily., Disp: 90 tablet, Rfl: 1    fluticasone-salmeterol (WIXELA INHUB) 250-50 MCG/ACT Inhalation Aerosol Powder, Breath Activated, Inhale 1 puff into the lungs every 12 (twelve) hours., Disp: 1 each, Rfl: 5    Lancets Does not apply Misc, Dx. E11.9. Checks qam., Disp: 50 each, Rfl: 6    Blood Glucose Monitoring Suppl (BLOOD GLUCOSE SYSTEM  GILLES) Does not apply Kit, Dx. E11.9. Checks qam., Disp: 1 kit, Rfl: 0    Blood Glucose Monitoring Suppl w/Device Does not apply Kit, Dx. E11.9. Checks qam., Disp: 1 kit, Rfl: 0    Blood Gluc Meter Disp-Strips Does not apply Device, Dx. E11.9. Checks qam., Disp: 100 each, Rfl: 0    gabapentin 100 MG Oral Cap, Take 1 capsule (100 mg total) by mouth nightly., Disp: 90 capsule, Rfl: 3    midodrine 2.5 MG Oral Tab, Take 1 tablet (2.5 mg total) by mouth in the morning and 1 tablet (2.5 mg total) at noon and 1 tablet (2.5 mg total) in the evening. Hold if BP > 120/80.., Disp: 90 tablet, Rfl: 0    LORATADINE 10 MG Oral Tab, TAKE 1 TABLET BY MOUTH EVERY DAY, Disp: 90 tablet, Rfl: 1    Accu-Chek FastClix Lancets Does not apply Misc, Check sugar once daily as directed (E11.42), Disp: 100 each, Rfl: 2    hydroxychloroquine 200 MG Oral Tab, Take 1 tablet (200 mg total) by mouth daily., Disp: 90 tablet, Rfl: 1    Glucose Blood (ACCU-CHEK GUIDE) In Vitro Strip, Check blood sugar twice a day (fasting and before dinner). DX: E11.42, NIDDM, Disp: 200 strip, Rfl: 1    rosuvastatin (CRESTOR) 20 MG Oral Tab, Take 1 tablet (20 mg total) by mouth nightly., Disp: 90 tablet, Rfl: 0    glycopyrrolate 1 MG Oral Tab, Take 1 tablet (1 mg total) by mouth 3 (three) times daily., Disp: 90 tablet, Rfl: 1    albuterol 108 (90 Base) MCG/ACT Inhalation Aero Soln, inhale 2 puff by inhalation route  every 4 - 6 hours as needed, Disp: 1 each, Rfl: 0    albuterol 108 (90 Base) MCG/ACT Inhalation Aero Soln, inhale 2 puff by inhalation route  every 4 - 6 hours as needed, Disp: 1 each, Rfl: 5    Blood Glucose Monitoring Suppl (ONETOUCH VERIO) w/Device Does not apply Kit, 1 each daily. Test 1x daily, Disp: 1 kit, Rfl: 0    Glucose Blood (ONETOUCH VERIO) In Vitro Strip, Test 1x daily, Disp: 100 strip, Rfl: 1    OneTouch Delica Lancets 33G Does not apply Misc, 4 x daily, Disp: 100 each, Rfl: 1    Blood Glucose Monitoring Suppl (BLOOD GLUCOSE SYSTEM GILLES) Does not  apply Kit, DX E 11.9.  Checks every morning., Disp: 1 kit, Rfl: 0    Lancets Does not apply Misc, DX E 11.9.  Checks every morning., Disp: 50 each, Rfl: 11    Blood Glucose Monitoring Suppl w/Device Does not apply Kit, DX E 11.9.  Checks every morning., Disp: 1 kit, Rfl: 0    Blood Gluc Meter Disp-Strips Does not apply Device, DX E 11.9.  Checks every morning., Disp: 100 each, Rfl: 11    acetaminophen 500 MG Oral Tab, Take 1 tablet (500 mg total) by mouth daily., Disp: , Rfl:     Allergies:  Allergies   Allergen Reactions    Celebrex [Celecoxib] PALPITATIONS    Penicillins ITCHING and SWELLING    Metformin And Related UNKNOWN    Olmesartan UNKNOWN        Review of Systems:Oncology specific ROS negative except as per HPI    BP (!) 184/77 (BP Location: Left arm, Patient Position: Sitting, Cuff Size: adult)   Pulse 76   Temp 97.6 °F (36.4 °C) (Oral)   Resp 16   Ht 1.549 m (5' 1\")   Wt 64 kg (141 lb)   SpO2 100%   BMI 26.64 kg/m²   Wt Readings from Last 6 Encounters:   08/02/24 64 kg (141 lb)   07/19/24 63.7 kg (140 lb 6.4 oz)   06/12/24 64.4 kg (142 lb)   06/07/24 64.7 kg (142 lb 9.6 oz)   05/21/24 64.9 kg (143 lb)   05/13/24 64.9 kg (143 lb)   General: Patient is alert and oriented x 3, not in acute distress.  HEENT: EOMI, MMM  Chest: Clear to auscultation bilaterally.  Abdomen: soft, non tender, non distended  Extremities: no signs of LE edema  Neurological: Strength is grossly intact. Moving all extremities. Gait appropriate.   Psych/Depression: Appropriate mood and affect          Lab Results   Component Value Date    WBC 8.4 08/02/2024    RBC 3.64 (L) 08/02/2024    HGB 10.1 (L) 08/02/2024    HCT 33.1 (L) 08/02/2024    MCV 90.9 08/02/2024    MCH 27.7 08/02/2024    MCHC 30.5 (L) 08/02/2024    RDW 14.8 08/02/2024    .0 08/02/2024     Lab Results   Component Value Date     (H) 08/02/2024    BUN 21 08/02/2024    BUNCREA 19.6 08/02/2024    CREATSERUM 1.07 (H) 08/02/2024    ANIONGAP 6 08/02/2024     GFRNAA 47 (L) 05/02/2022    GFRAA 54 (L) 05/02/2022    CA 9.2 08/02/2024    OSMOCALC 289 08/02/2024    ALKPHO 92 06/20/2024    AST 16 06/20/2024    ALT 9 (L) 06/20/2024    BILT 0.2 06/20/2024    TP 7.1 06/20/2024    ALB 3.6 06/20/2024    GLOBULIN 3.5 06/20/2024     08/02/2024    K 3.9 08/02/2024     08/02/2024    CO2 26.0 08/02/2024      Latest Reference Range & Units 08/02/24 10:27   FERRITIN 50.0 - 306.0 ng/mL 78.7       Impression and Plan:  84 year old with chronic iron deficiency anemia in the setting of bleeding AVMs s/p repeat endoscopic treatment, Iv iron treatment and rbc tfx 5/2022    1.) BUZZ from AVM's    - previously CAPUTO has been a hallmark for symptomatic anemia for her, also has worsening fatigue and some lightheadedness  --pt received venofer in 4/24 and currently feels improved    --current ferritin level is over 50, so she does not need IV iron  --hgb has been stable~10; f//u in 8 weeks     --pt has improved symptoms with monthly octreotide by Dr. Garcia in GI for continued blood loss; continue care and management    2.) HTN  --remains elevated; improved, she will f/u with her PCP    3) History of PE  --found on 2/23/24; not on anticoagulation in light of recent GI bleeding   --IVC filter placed on 3/7/24 by IR; she was rec for IVC filter retrial in 3-6 mo by IR, so this was removed  in 6/24  --her PE was noted to be resolved by CT chest on 4/26    MDM: Moderate Risk; ongoing likely GI bleeding from AVM's, HTN, acute PE, IVC filter placement    Eric Mohamud MD  [facility]  [address], [place] [address]

## 2024-08-03 NOTE — PROGRESS NOTES
BMP Normal (electrolytes, and liver functions), slight decline in kidney function but much better than prior.  magnesium level is okay.

## 2024-08-06 ENCOUNTER — OFFICE VISIT (OUTPATIENT)
Facility: CLINIC | Age: 84
End: 2024-08-06

## 2024-08-06 VITALS
DIASTOLIC BLOOD PRESSURE: 80 MMHG | BODY MASS INDEX: 26 KG/M2 | SYSTOLIC BLOOD PRESSURE: 138 MMHG | WEIGHT: 139 LBS | HEART RATE: 84 BPM

## 2024-08-06 DIAGNOSIS — E83.42 HYPOMAGNESEMIA: ICD-10-CM

## 2024-08-06 DIAGNOSIS — I10 HYPERTENSION, UNSPECIFIED TYPE: ICD-10-CM

## 2024-08-06 DIAGNOSIS — N17.9 AKI (ACUTE KIDNEY INJURY) (HCC): Primary | ICD-10-CM

## 2024-08-06 PROCEDURE — 1126F AMNT PAIN NOTED NONE PRSNT: CPT | Performed by: INTERNAL MEDICINE

## 2024-08-06 PROCEDURE — 1160F RVW MEDS BY RX/DR IN RCRD: CPT | Performed by: INTERNAL MEDICINE

## 2024-08-06 PROCEDURE — 99214 OFFICE O/P EST MOD 30 MIN: CPT | Performed by: INTERNAL MEDICINE

## 2024-08-06 PROCEDURE — 1159F MED LIST DOCD IN RCRD: CPT | Performed by: INTERNAL MEDICINE

## 2024-08-06 PROCEDURE — 3079F DIAST BP 80-89 MM HG: CPT | Performed by: INTERNAL MEDICINE

## 2024-08-06 PROCEDURE — 3075F SYST BP GE 130 - 139MM HG: CPT | Performed by: INTERNAL MEDICINE

## 2024-08-06 RX ORDER — FAMOTIDINE 40 MG/1
40 TABLET, FILM COATED ORAL 2 TIMES DAILY
COMMUNITY

## 2024-08-06 NOTE — PROGRESS NOTES
[facility]    Progress Note     Chester Bautista    85 y/o F with h/o  COPD,HTN, breast ca s/p radiation tx ( no chemo) PE  s/p IVC placed  , urinary retention ( benson been removed) iron def anemia sec to bleeding AVMs  (sees Dr Mohamud and DR Garcia) and RA ( Dr Goodrich).    She has  hypomagnesemia since feb 2024. Reviewed labs no mag levels done before. Never had issues before.   No diarrhea, vomiting. No diuretic use. Appetite is fair as well  Noted to be on PPI for a long time    Since lov  Been off ppi  Taking pepcid  Also bs running v high  Also had UTI          HISTORY:  Past Medical History:    Anxiety state    Cancer (HCC)    breast cancer 2018    Chronic obstructive pulmonary disease, unspecified (HCC)    Chronic obstructive pulmonary disease, unspecified (HCC)    COPD (chronic obstructive pulmonary disease) (HCC)    Diabetes (HCC)    Diabetes mellitus (HCC)    Essential hypertension    Exposure to medical diagnostic radiation    High blood pressure    High cholesterol    History of blood transfusion    low hemoglobin    Osteoarthritis    PONV (postoperative nausea and vomiting)    Pulmonary embolism (HCC)    Pulmonary emphysema (HCC)    Rheumatoid arthritis (HCC)      Past Surgical History:   Procedure Laterality Date    Cataract extraction w/  intraocular lens implant Bilateral 2017    Dr in [facility]     Colonoscopy      Colonoscopy N/A 07/21/2022    Procedure: COLONOSCOPY;  Surgeon: Mikey Garcia MD;  Location: [facility] ENDOSCOPY    Colonoscopy N/A 06/15/2023    Procedure: COLONOSCOPY;  Surgeon: Mikey Garcia MD;  Location: [facility] ENDOSCOPY    Correct bunion,simple      right foot  1993    Egd  12/21/2023    Dr. Garcia; Duodenal AVM's x4 cauterized    Hysterectomy      1972, no abnormal Paps, due to heavy bleeding with fibroids.  Not sure if it had bilateral salpingo-oophorectomy.    Ir aneurysm repairm  2010    Computer assisted volumetric craniofomy with clipping of anterior  cerebral artery.    Lumpectomy right      Radiation right      Rotator cuff repair      1993    Total knee replacement Right 2010    Total knee replacement Left 07/02/2015    Transoral incisionless fundoplication - internal  01/25/2012 Fredy fundoplication at [facility] Dr. Fournier.    Fredy fundoplication at [facility] Dr. Fournier.    Trigger finger release      left hand  2005    Yag capsulotomy - ou - both eyes Bilateral 09/2021    done in [place]           Medications (Active prior to today's visit):  Current Outpatient Medications   Medication Sig Dispense Refill    famotidine 40 MG Oral Tab Take 1 tablet (40 mg total) by mouth 2 (two) times daily.      montelukast 10 MG Oral Tab Take 1 tablet (10 mg total) by mouth nightly. 90 tablet 3    Insulin Aspart Prot & Aspart (INSULIN ASP PROT & ASP FLEXPEN) (70-30) 100 UNIT/ML Subcutaneous Suspension Pen-injector Inject 12 Units into the skin daily with lunch. (Patient taking differently: Inject 24 Units into the skin daily with lunch.) 9 mL 0    glipiZIDE 5 MG Oral Tab Take 2 tablets (10 mg total) by mouth before breakfast. 180 tablet 0    valsartan 80 MG Oral Tab Take 1 tablet (80 mg total) by mouth daily. 90 tablet 3    cyclobenzaprine 10 MG Oral Tab Take 1 tablet (10 mg total) by mouth nightly as needed. 90 tablet 1    tamsulosin 0.4 MG Oral Cap Take 1 capsule (0.4 mg total) by mouth daily. 90 capsule 1    carvedilol 12.5 MG Oral Tab Take 1 tablet (12.5 mg total) by mouth 2 (two) times daily with meals. 180 tablet 3    predniSONE 5 MG Oral Tab Take 1 tablet (5 mg total) by mouth daily. 90 tablet 1    fluticasone-salmeterol (WIXELA INHUB) 250-50 MCG/ACT Inhalation Aerosol Powder, Breath Activated Inhale 1 puff into the lungs every 12 (twelve) hours. 1 each 5    gabapentin 100 MG Oral Cap Take 1 capsule (100 mg total) by mouth nightly. 90 capsule 3    midodrine 2.5 MG Oral Tab Take 1 tablet (2.5 mg total) by mouth in the morning and 1  tablet (2.5 mg total) at noon and 1 tablet (2.5 mg total) in the evening. Hold if BP > 120/80.. 90 tablet 0    LORATADINE 10 MG Oral Tab TAKE 1 TABLET BY MOUTH EVERY DAY 90 tablet 1    hydroxychloroquine 200 MG Oral Tab Take 1 tablet (200 mg total) by mouth daily. 90 tablet 1    rosuvastatin (CRESTOR) 20 MG Oral Tab Take 1 tablet (20 mg total) by mouth nightly. 90 tablet 0    glycopyrrolate 1 MG Oral Tab Take 1 tablet (1 mg total) by mouth 3 (three) times daily. 90 tablet 1    albuterol 108 (90 Base) MCG/ACT Inhalation Aero Soln inhale 2 puff by inhalation route  every 4 - 6 hours as needed 1 each 5    acetaminophen 500 MG Oral Tab Take 1 tablet (500 mg total) by mouth daily.      torsemide 20 MG Oral Tab Take 1 tablet (20 mg total) by mouth daily.      Insulin Pen Needle 33G X 4 MM Does not apply Misc 1 each daily. Use daily with insulin 100 each 0    Lancets Does not apply Misc Dx. E11.9. Checks qam. 50 each 6    Blood Glucose Monitoring Suppl (BLOOD GLUCOSE SYSTEM GLILES) Does not apply Kit Dx. E11.9. Checks qam. 1 kit 0    Blood Glucose Monitoring Suppl w/Device Does not apply Kit Dx. E11.9. Checks qam. 1 kit 0    Blood Gluc Meter Disp-Strips Does not apply Device Dx. E11.9. Checks qam. 100 each 0    Accu-Chek FastClix Lancets Does not apply Misc Check sugar once daily as directed (E11.42) 100 each 2    Glucose Blood (ACCU-CHEK GUIDE) In Vitro Strip Check blood sugar twice a day (fasting and before dinner). DX: E11.42, NIDDM 200 strip 1    albuterol 108 (90 Base) MCG/ACT Inhalation Aero Soln inhale 2 puff by inhalation route  every 4 - 6 hours as needed 1 each 0    Blood Glucose Monitoring Suppl (ONETOUCH VERIO) w/Device Does not apply Kit 1 each daily. Test 1x daily 1 kit 0    Glucose Blood (ONETOUCH VERIO) In Vitro Strip Test 1x daily 100 strip 1    OneTouch Delica Lancets 33G Does not apply Misc 4 x daily 100 each 1    Blood Glucose Monitoring Suppl (BLOOD GLUCOSE SYSTEM GILLES) Does not apply Kit DX E 11.9.   Checks every morning. 1 kit 0    Lancets Does not apply Misc DX E 11.9.  Checks every morning. 50 each 11    Blood Glucose Monitoring Suppl w/Device Does not apply Kit DX E 11.9.  Checks every morning. 1 kit 0     OFF midodrine  Not on Torsemide  Not on kcl  Not on omeprazole    Allergies:  Allergies   Allergen Reactions    Celebrex [Celecoxib] PALPITATIONS    Penicillins ITCHING and SWELLING    Metformin And Related UNKNOWN    Olmesartan UNKNOWN         ROS:     Constitutional:  Negative for decreased activity, fever, irritability and lethargy  ENMT:  Negative for ear drainage, hearing loss and nasal drainage  Eyes:  Negative for eye discharge and vision loss  Cardiovascular:  Negative for chest pain, sob  Respiratory:  Negative for cough, dyspnea and wheezing  Gastrointestinal:  Negative for abdominal pain, constipation  Genitourinary:  Negative for dysuria and hematuria  Endocrine:  Negative for abnormal sleep patterns  Hema/Lymph:  Negative for easy bleeding and easy bruising  Integumentary:  Negative for pruritus and rash  Musculoskeletal:  Negative for bone/joint symptoms  Neurological:  Negative for gait disturbance  Psychiatric:  Negative for inappropriate interaction and psychiatric symptoms      Vitals:    08/06/24 1050   BP: 138/80   Pulse: 84       PHYSICAL EXAM:     Constitutional: appears well hydrated alert and responsive no acute distress noted  Head/Face: normocephalic  Eyes/Vision: normal extraocular motion is intact  Nose/Mouth/Throat:mucous membranes are moist   Neck/Thyroid: neck is supple without adenopathy  Lymphatic: no abnormal cervical, supraclavicular adenopathy is noted  Respiratory:  lungs are clear to auscultation bilaterally  Cardiovascular: regular rate and rhythm  Abdomen: soft, non-tender, non-distended, BS normal  Skin/Hair: no unusual rashes present  Back/Spine: no abnormalities noted  Musculoskeletal:  no deformities  Extremities: no edema  Neurological:  Grossly normal    Lab  Results   Component Value Date     08/02/2024     (L) 07/08/2024     06/28/2024    K 3.9 08/02/2024    K 3.5 07/08/2024    K 4.7 06/28/2024     08/02/2024     07/08/2024     06/28/2024    CO2 26.0 08/02/2024    CO2 27.0 07/08/2024    CO2 25.0 06/28/2024    BUN 21 08/02/2024    BUN 14 07/08/2024    BUN 19 06/28/2024    CREATSERUM 1.07 (H) 08/02/2024    CREATSERUM 1.40 (H) 07/08/2024    CREATSERUM 1.29 (H) 06/28/2024    CA 9.2 08/02/2024    CA 8.8 07/08/2024    CA 9.3 06/28/2024    EGFRCR 51 (L) 08/02/2024    EGFRCR 37 (L) 07/08/2024    EGFRCR 41 (L) 06/28/2024    ALB 3.6 06/20/2024    ALB 3.7 06/07/2024    ALB 3.5 03/25/2024    PHOS 3.9 06/07/2024          ASSESSMENT/PLAN:   Assessment   1. DARWIN (acute kidney injury) (HCC)    2. Hypomagnesemia  - Basic Metabolic Panel (8); Future  - Magnesium; Future    3. Hypertension, unspecified type         DARWIN  Likely in setting of hyperglycemia causing polyuria and prerenal DARWIN  Also UTI could be contributing but patient says she took abx   She states she has NOT been taking torsemide  Will keep it that way  Her insulin been adjusted and will work on low carb diet and getting BS in control  Renal fx has improved    Hypomagnesemia   Likely sec to omeprazole causing impaired absorption of magnesium by intestinal epithelial cells bc of ppi induced inhibition of TRPM6 and TRPM7 channels.  Taking mag oxide 800 mg po four  times a day     She came off ppi and now on pepcid and tolerating well  On magoxide three times a day--> mag levels ok  Will dec magoxide to two times a day  Recheck labs in 4 weeks  Inc mag containing food in diet    HTN  On valsartan and metoprolol  Check BP at home  Maintain a log      PLAN  Cr is better  Mag is better  Dec mag oxide to bid  Repeat labs in 4 weeks  Labs and fu in 3 months     Orders This Visit:  Orders Placed This Encounter   Procedures    Basic Metabolic Panel (8)    Magnesium       Meds This Visit:  Requested  Prescriptions      No prescriptions requested or ordered in this encounter       Imaging & Referrals:  None       Hannah Encinas MD  8/6/2024

## 2024-08-12 ENCOUNTER — TELEPHONE (OUTPATIENT)
Dept: HEMATOLOGY/ONCOLOGY | Facility: HOSPITAL | Age: 84
End: 2024-08-12

## 2024-08-12 NOTE — TELEPHONE ENCOUNTER
Called and spoke with patient's daughter, Brea. She asked if Dr. Mohamud reviewed the patient's iron lab results and asked if she needs IV iron. Reviewed 's note and told her he stated, \"current ferritin level is over 50, so she does not need IV iron. Hgb has been stable at 10; follow up in 8 weeks\". She stated understanding and thanked me for the call back.

## 2024-08-12 NOTE — TELEPHONE ENCOUNTER
Prudencio [phone]  has questions she would like to ask. Would like a return call. Thanks Barbie

## 2024-08-17 DIAGNOSIS — Z51.81 MEDICATION MONITORING ENCOUNTER: ICD-10-CM

## 2024-08-17 DIAGNOSIS — M05.79 RHEUMATOID ARTHRITIS INVOLVING MULTIPLE SITES WITH POSITIVE RHEUMATOID FACTOR (HCC): Primary | ICD-10-CM

## 2024-08-20 RX ORDER — PEN NEEDLE, DIABETIC 32GX 5/32"
NEEDLE, DISPOSABLE MISCELLANEOUS
Qty: 100 EACH | Refills: 1 | Status: SHIPPED | OUTPATIENT
Start: 2024-08-20

## 2024-08-20 RX ORDER — PREDNISONE 5 MG/1
5 TABLET ORAL DAILY
Qty: 90 TABLET | Refills: 0 | Status: SHIPPED | OUTPATIENT
Start: 2024-08-20

## 2024-08-20 RX ORDER — HYDROXYCHLOROQUINE SULFATE 200 MG/1
200 TABLET, FILM COATED ORAL DAILY
Qty: 90 TABLET | Refills: 0 | Status: SHIPPED | OUTPATIENT
Start: 2024-08-20

## 2024-08-20 NOTE — TELEPHONE ENCOUNTER
Requested Prescriptions     Pending Prescriptions Disp Refills    HYDROXYCHLOROQUINE 200 MG Oral Tab [Pharmacy Med Name: HYDROXYCHLOROQUINE 200 MG TAB] 90 tablet 1     Sig: TAKE 1 TABLET BY MOUTH EVERY DAY     LOV: 8/10/23  Future Appointments   Date Time Provider Department Center   8/27/2024  1:45 PM Vic Thakkar APRN [facility]   8/30/2024  1:30 PM EM CC LAB1 [facility] CHEMO EMO   9/6/2024  2:00 PM EM CC LAB1 [facility] CHEMO EMO   9/24/2024  1:45 PM Chapin Parker MD [facility]   9/26/2024  1:20 PM Eric Mohamud MD [facility] HEM ONC EMO   10/15/2024  1:45 PM Chapin Parker MD [facility]ULM [facility]   11/6/2024  2:00 PM Hannah Encinas MD [id number]  [facility]        ASSESSMENT/PLAN:      Seropositive RA- stable   - Currently on hydroxychloroquine 200 mg daily and prednisone 5 mg daily  - At times she will have pain in her hands and wrists but not severe.  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  Getting IV iron infusions     Peripheral neuropathy- stable, resolved   - Was seen by Dr. 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  Wondering if she needs to go on treatment for the positive hepatitis B     Pt will f/u in 3 mos      Ashia Goodrich MD  8/10/2023  12:40 PM

## 2024-08-20 NOTE — TELEPHONE ENCOUNTER
Endocrine Refill protocol for Glucose testing supplies     Protocol Criteria: PASSED  Appointment with Endocrinology completed in the last 12 months or scheduled in the next 6 months     Verify appointment has been completed or scheduled in the appropriate timeline. If so can send a 90 day supply with 1 refill.     Last completed office visit: 7/19/2024 Vic Thakkar APRN   Next scheduled Follow up: 8/27/24

## 2024-08-20 NOTE — TELEPHONE ENCOUNTER
Requested Prescriptions     Pending Prescriptions Disp Refills    PREDNISONE 5 MG Oral Tab [Pharmacy Med Name: PREDNISONE 5 MG TABLET] 90 tablet 1     Sig: TAKE 1 TABLET (5 MG TOTAL) BY MOUTH DAILY.     LOV: 8/10/23  Future Appointments   Date Time Provider Department Center   8/27/2024  1:45 PM Vic Thakkar APRN [facility]ENDO [facility]   8/30/2024  1:30 PM EM CC LAB1 [facility] CHEMO EMO   9/6/2024  2:00 PM EM CC LAB1 [facility] CHEMO EMO   9/24/2024  1:45 PM Chapin Parker MD ECWMOJEAN [facility]   9/26/2024  1:20 PM Eric Mhoamud MD [facility] HEM ONC EMO   10/15/2024  1:45 PM Chapin Parker MD ECWMOJEAN [facility]   11/6/2024  2:00 PM Hannah Encinas MD [id number] [facility]     Labs:   Component      Latest Ref Rng 9/14/2023   WBC      4.0 - 11.0 x10(3) uL 9.6    RBC      3.80 - 5.30 x10(6)uL 2.92 (L)    Hemoglobin      12.0 - 16.0 g/dL 8.7 (L)    Hematocrit      35.0 - 48.0 % 28.0 (L)    MCV      80.0 - 100.0 fL 95.9    MCH      26.0 - 34.0 pg 29.8    MCHC      31.0 - 37.0 g/dL 31.1    RDW-SD      35.1 - 46.3 fL 52.9 (H)    RDW      11.0 - 15.0 % 15.3 (H)    Platelet Count      150.0 - 450.0 10(3)uL 311.0    Prelim Neutrophil Abs      1.50 - 7.70 x10 (3) uL 7.26    Neutrophils Absolute      1.50 - 7.70 x10(3) uL 7.26    Lymphocytes Absolute      1.00 - 4.00 x10(3) uL 1.37    Monocytes Absolute      0.10 - 1.00 x10(3) uL 0.73    Eosinophils Absolute      0.00 - 0.70 x10(3) uL 0.10    Basophils Absolute      0.00 - 0.20 x10(3) uL 0.04    Immature Granulocyte Absolute      0.00 - 1.00 x10(3) uL 0.14    Neutrophils %      % 75.3    Lymphocytes %      % 14.2    Monocytes %      % 7.6    Eosinophils %      % 1.0    Basophils %      % 0.4    Immature Granulocyte %      % 1.5    Iron, Serum      50 - 170 ug/dL 38 (L)    Transferrin      200 - 360 mg/dL 211    Iron Bind.Cap.(TIBC)      240 - 450 ug/dL 314    Iron Saturation      15 - 50 % 12 (L)    CREATININE      0.55 - 1.02 mg/dL 0.92    EGFR      >=60 mL/min/1.73m2 61     FERRITIN      18.0 - 340.0 ng/mL 97.0    Albumin      3.4 - 5.0 g/dL 2.8 (L)    ALT (SGPT)      13 - 56 U/L 18    AST (SGOT)      15 - 37 U/L 20    C-REACTIVE PROTEIN      <0.30 mg/dL 1.10 (H)    SED RATE      0 - 30 mm/Hr 91 (H)       Legend:  (L) Low  (H) High      ASSESSMENT/PLAN:      Seropositive RA- stable   - Currently on hydroxychloroquine 200 mg daily and prednisone 5 mg daily  - At times she will have pain in her hands and wrists but not severe.  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  Getting IV iron infusions     Peripheral neuropathy- stable, resolved   - Was seen by Dr. 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  Wondering if she needs to go on treatment for the positive hepatitis B     Pt will f/u in 3 mos      Ashia Goodrich MD  8/10/2023  12:40 PM

## 2024-08-21 NOTE — TELEPHONE ENCOUNTER
Medication External Reported Please Advise  Has been previously written by Dr. Sorto, please see dispense history.   Last office visit 03/22/2024      Requested Prescriptions   Pending Prescriptions Disp Refills    TORSEMIDE 20 MG Oral Tab [Pharmacy Med Name: TORSEMIDE 20 MG TABLET] 90 tablet 1     Sig: TAKE 1 TABLET BY MOUTH EVERY DAY       Hypertension Medications Protocol Passed - 8/18/2024  7:23 AM        Passed - CMP or BMP in past 12 months        Passed - Last BP reading less than 140/90     BP Readings from Last 1 Encounters:   08/06/24 138/80               Passed - In person appointment or virtual visit in the past 12 mos or appointment in next 3 mos     Recent Outpatient Visits              2 weeks ago DARWIN (acute kidney injury) (HCC)    [facility] Hannah Encinas MD    Office Visit    2 weeks ago History of iron deficiency anemia    [facility] Hematology Oncology Eric Mohamud MD    Office Visit    2 weeks ago Encounter for care related to vascular access port    [facility]    Nurse Only    3 weeks ago Glaucoma suspect of both eyes    [facility]    Nurse Only    1 month ago Uncontrolled type 2 diabetes mellitus with hypoglycemia without coma, without long-term current use of insulin (HCC)    [facility] Vic Thakkar APRN    Office Visit          Future Appointments         Provider Department Appt Notes    In 1 week Vic Thakkar APRN [facility]     In 1 week EM [facility] LAB1 [facility] OCTREOTIDE INJECTION    In 2 weeks EM [facility] LAB1 [facility] -OCTREOTIDE-CBC-FERRITIN-IRON-PORT-cl    In 1 month Chapin Parker MD [facility] yearly    In 1 month Eric Mohamud MD [facility]  Hematology Oncology 2 m f/u.md    In 1 month Chapin Parker MD [facility] 4 - 6 mos    In 2 months Hannah Encinas MD [facility] 3 month follow up                    Passed - EGFRCR or GFRAA > 50     GFR Evaluation  EGFRCR: 51 , resulted on 8/2/2024                 Future Appointments         Provider Department Appt Notes    In 1 week [facility]     In 1 week EM [facility] LAB1 [facility] OCTREOTIDE INJECTION    In 2 weeks EM [facility] LAB1 [facility] -OCTREOTIDE-CBC-FERRITIN-IRON-PORT-cl    In 1 month Chapin Parker MD [facility] yearly    In 1 month Eric Mohamud MD [facility] Hematology Oncology 2 m f/    In 1 month Chapin Parker MD [facility] 4 - 6 mos    In 2 months Hannah Encinas MD [facility] 3 month follow up          Recent Outpatient Visits              2 weeks ago DARWIN (acute kidney injury) (HCC)    [facility] Hannah Encinas MD    Office Visit    2 weeks ago History of iron deficiency anemia    [facility] Hematology Oncology Eric Mohamud MD    Office Visit    2 weeks ago Encounter for care related to vascular access port    [facility]    Nurse Only    3 weeks ago Glaucoma suspect of both eyes    [facility]    Nurse Only    1 month ago Uncontrolled type 2 diabetes mellitus with hypoglycemia without coma, without long-term current use of insulin (HCC)    [facility] Vic Thakkar APRN    Office Visit

## 2024-08-27 ENCOUNTER — OFFICE VISIT (OUTPATIENT)
Dept: ENDOCRINOLOGY CLINIC | Facility: CLINIC | Age: 84
End: 2024-08-27

## 2024-08-27 VITALS
DIASTOLIC BLOOD PRESSURE: 80 MMHG | HEIGHT: 61 IN | SYSTOLIC BLOOD PRESSURE: 145 MMHG | HEART RATE: 79 BPM | WEIGHT: 144.38 LBS | BODY MASS INDEX: 27.26 KG/M2

## 2024-08-27 DIAGNOSIS — E11.65 UNCONTROLLED TYPE 2 DIABETES MELLITUS WITH HYPERGLYCEMIA (HCC): Primary | ICD-10-CM

## 2024-08-27 LAB
GLUCOSE BLOOD: 288
HEMOGLOBIN A1C: 10.3 % (ref 4.3–5.6)
TEST STRIP LOT #: NORMAL NUMERIC

## 2024-08-27 PROCEDURE — 3008F BODY MASS INDEX DOCD: CPT | Performed by: NURSE PRACTITIONER

## 2024-08-27 PROCEDURE — 99214 OFFICE O/P EST MOD 30 MIN: CPT | Performed by: NURSE PRACTITIONER

## 2024-08-27 PROCEDURE — 1159F MED LIST DOCD IN RCRD: CPT | Performed by: NURSE PRACTITIONER

## 2024-08-27 PROCEDURE — 82947 ASSAY GLUCOSE BLOOD QUANT: CPT | Performed by: NURSE PRACTITIONER

## 2024-08-27 PROCEDURE — 95251 CONT GLUC MNTR ANALYSIS I&R: CPT | Performed by: NURSE PRACTITIONER

## 2024-08-27 PROCEDURE — 83036 HEMOGLOBIN GLYCOSYLATED A1C: CPT | Performed by: NURSE PRACTITIONER

## 2024-08-27 PROCEDURE — 1160F RVW MEDS BY RX/DR IN RCRD: CPT | Performed by: NURSE PRACTITIONER

## 2024-08-27 PROCEDURE — 3077F SYST BP >= 140 MM HG: CPT | Performed by: NURSE PRACTITIONER

## 2024-08-27 PROCEDURE — 3079F DIAST BP 80-89 MM HG: CPT | Performed by: NURSE PRACTITIONER

## 2024-08-27 NOTE — PROGRESS NOTES
Name: Chester Bautista  Date: 8/27/2024    CHIEF COMPLAINT   Chief Complaint   Patient presents with    Diabetes     Pt is here for follow up for diabetes and A1c check      HISTORY OF PRESENT ILLNESS   Chester Bautista is a 84 year old female who presents for follow up on diabetes management. Patient is accompanied by her daughter Tommy today.   HbA1C: 10.3% at POC today. Previously was 10.3% on 7/19/2024.   Blood glucose is: 288 in clinic today.   Patient notes that she has been feeling well and denies any health changes or taking new medications since last office visit.   She has not been following a low carb diet and within the past 1-2 months has been eating more snacks/sweets due to cravings.   She has not changed medication regimen as advised via telephone on  8/19. She just started new regimen last night.     FAMILY HISTORY OF DIABETES  -paternal grandmother and paternal aunt.   DIABETES HISTORY  Diagnosed: had prediabetes since 2003; diagnosed with T2DM 11/7/21;   Prior HbA, C or glycohemoglobin were 7.2% on 11/7/2021;  7.7% 2/26/2022; 7.3% 5/2/2022; 6.0% 9/1/2022; 6.4% 3/21/2023; 8.1% 6/29/2023; 7.0% 10/18/2023; 9.5% on 2/2/2024; 9.0% 5/21/2024; 10.3% 7/19/2024; 10.3% at POC today;     PREVIOUS MEDICATION FOR DM:  Metformin -d/c'ed due to allergic reaction - swelling to bottom of lip and tongue itching; also vomiting.   - 70/30 insulin: d/c'ed due to lack of glycemic control     CURRENT MEDICATIONS FOR DM:  Glipizide 5mg daily with breakfast-- started today   Lantus 10 units daily with dinner -- started last night     HOME GLUCOSE READINGS:   Freestyle van 3 CGM download reviewed with patient. The results revealed (8/14 to 8/27):    Average glucose: 236 mg/dl     In target range: (70-180mg/dl): 24% --> improving     High glucose targets: (> 180mg/dl): 34% --> improving     Very high glucose targets: (> 250mg/dl): 42% --> improving     Low glucose targets: (less than 70mg/dl): 0%     Very  Low glucose targets: (< 54mg/dl ): 0%     Glucose Management Indicator (GMI): 9.0% --> improving  Glucose Variability: 30.6%    Continuous Glucose Monitoring Interpretation    Chester Bautista has undergone continuous glucose monitoring with the personal Freestyle van 3 continuous glucose monitor.  The blood glucose tracings were evaluated for two weeks prior to office visit.  Her blood glucose tracings demonstrated postprandial hyperglycemia occurring with variable meals. She did not experience hypoglycemia during the week of evaluation.  As a result of her testing her medications were adjusted per below.     HISTORY OF DIABETES COMPLICATIONS:  History of Retinopathy: no  - last eye exam within the last 12 months: yes  History of Neuropathy: no   History of Nephropathy: no     ASSOCIATED COMPLICATIONS:   HTN: yes   Hyperlipidemia: yes   Cardiovascular Disease: no   Peripheral Vascular Disease: no     DIETARY COMPLIANCE:  Poor; eating higher carb snacks/sweets   3 waffles with breakfast occasionally + sugar free syrup  2 yasoo ice cream bars later in the evening   + peanut butter cracker sandwiches (6 in a package)      EXERCISE:   No     Polyuria, polyphagia, polydipsia: no   Paresthesias: no   Blurred vision: no   Recent steroids, illness or infections: yes  - on prednisone for pain - RA; has been taking since 7-9/2021    REVIEW OF SYSTEMS  Constitutional: Negative for: weight change, fever, fatigue, cold/heat intolerance  Eyes: Negative for:  Visual changes, proptosis, blurring  ENT: Negative for:  dysphagia, neck swelling, dysphonia  Respiratory: Negative for: hemoptysis, shortness of breath, cough, or dyspnea.  Cardiovascular: Negative for:  chest pain, chest discomfort, palpitations  GI: Negative for:  abdominal pain, nausea, vomiting, diarrhea, heartburn, constipation  Neurology: Negative for: headache, dizziness, syncope, numbness/tingling, or weakness.   Genito-Urinary: Negative for: dysuria,  frequency or hematuria   Hematology/Lymphatics: Negative for: bruising, easy bleeding, lower extremity edema  Skin: Negative for: rash, blister, infection or ulcers.  Endocrine: Negative for: polyuria, polydipsia. No osteoporosis. No thyroid disease.     MEDICATIONS:     Current Outpatient Medications:     predniSONE 5 MG Oral Tab, Take 1 tablet (5 mg total) by mouth daily., Disp: 90 tablet, Rfl: 0    hydroxychloroquine 200 MG Oral Tab, Take 1 tablet (200 mg total) by mouth daily., Disp: 90 tablet, Rfl: 0    BD PEN NEEDLE AKDEN 2ND GEN 32G X 4 MM Does not apply Misc, 1 EACH DAILY. USE DAILY WITH INSULIN, Disp: 100 each, Rfl: 1    insulin glargine (LANTUS SOLOSTAR) 100 UNIT/ML Subcutaneous Solution Pen-injector, Inject 10 Units into the skin daily with dinner., Disp: 3 mL, Rfl: 1    glipiZIDE 5 MG Oral Tab, Take 1 tablet (5 mg total) by mouth every morning before breakfast., Disp: 30 tablet, Rfl: 1    famotidine 40 MG Oral Tab, Take 1 tablet (40 mg total) by mouth 2 (two) times daily., Disp: , Rfl:     montelukast 10 MG Oral Tab, Take 1 tablet (10 mg total) by mouth nightly., Disp: 90 tablet, Rfl: 3    torsemide 20 MG Oral Tab, Take 1 tablet (20 mg total) by mouth daily., Disp: , Rfl:     valsartan 80 MG Oral Tab, Take 1 tablet (80 mg total) by mouth daily., Disp: 90 tablet, Rfl: 3    cyclobenzaprine 10 MG Oral Tab, Take 1 tablet (10 mg total) by mouth nightly as needed., Disp: 90 tablet, Rfl: 1    tamsulosin 0.4 MG Oral Cap, Take 1 capsule (0.4 mg total) by mouth daily., Disp: 90 capsule, Rfl: 1    carvedilol 12.5 MG Oral Tab, Take 1 tablet (12.5 mg total) by mouth 2 (two) times daily with meals., Disp: 180 tablet, Rfl: 3    fluticasone-salmeterol (WIXELA INHUB) 250-50 MCG/ACT Inhalation Aerosol Powder, Breath Activated, Inhale 1 puff into the lungs every 12 (twelve) hours., Disp: 1 each, Rfl: 5    Lancets Does not apply Misc, Dx. E11.9. Checks qam., Disp: 50 each, Rfl: 6    Blood Glucose Monitoring Suppl (BLOOD  GLUCOSE SYSTEM GILLES) Does not apply Kit, Dx. E11.9. Checks qam., Disp: 1 kit, Rfl: 0    Blood Glucose Monitoring Suppl w/Device Does not apply Kit, Dx. E11.9. Checks qam., Disp: 1 kit, Rfl: 0    Blood Gluc Meter Disp-Strips Does not apply Device, Dx. E11.9. Checks qam., Disp: 100 each, Rfl: 0    gabapentin 100 MG Oral Cap, Take 1 capsule (100 mg total) by mouth nightly., Disp: 90 capsule, Rfl: 3    midodrine 2.5 MG Oral Tab, Take 1 tablet (2.5 mg total) by mouth in the morning and 1 tablet (2.5 mg total) at noon and 1 tablet (2.5 mg total) in the evening. Hold if BP > 120/80.., Disp: 90 tablet, Rfl: 0    LORATADINE 10 MG Oral Tab, TAKE 1 TABLET BY MOUTH EVERY DAY, Disp: 90 tablet, Rfl: 1    Accu-Chek FastClix Lancets Does not apply Misc, Check sugar once daily as directed (E11.42), Disp: 100 each, Rfl: 2    Glucose Blood (ACCU-CHEK GUIDE) In Vitro Strip, Check blood sugar twice a day (fasting and before dinner). DX: E11.42, NIDDM, Disp: 200 strip, Rfl: 1    rosuvastatin (CRESTOR) 20 MG Oral Tab, Take 1 tablet (20 mg total) by mouth nightly., Disp: 90 tablet, Rfl: 0    glycopyrrolate 1 MG Oral Tab, Take 1 tablet (1 mg total) by mouth 3 (three) times daily., Disp: 90 tablet, Rfl: 1    albuterol 108 (90 Base) MCG/ACT Inhalation Aero Soln, inhale 2 puff by inhalation route  every 4 - 6 hours as needed, Disp: 1 each, Rfl: 0    albuterol 108 (90 Base) MCG/ACT Inhalation Aero Soln, inhale 2 puff by inhalation route  every 4 - 6 hours as needed, Disp: 1 each, Rfl: 5    Blood Glucose Monitoring Suppl (ONETOUCH VERIO) w/Device Does not apply Kit, 1 each daily. Test 1x daily, Disp: 1 kit, Rfl: 0    Glucose Blood (ONETOUCH VERIO) In Vitro Strip, Test 1x daily, Disp: 100 strip, Rfl: 1    OneTouch Delica Lancets 33G Does not apply Misc, 4 x daily, Disp: 100 each, Rfl: 1    Blood Glucose Monitoring Suppl (BLOOD GLUCOSE SYSTEM GILLES) Does not apply Kit, DX E 11.9.  Checks every morning., Disp: 1 kit, Rfl: 0    Lancets Does not apply  Misc, DX E 11.9.  Checks every morning., Disp: 50 each, Rfl: 11    Blood Glucose Monitoring Suppl w/Device Does not apply Kit, DX E 11.9.  Checks every morning., Disp: 1 kit, Rfl: 0    acetaminophen 500 MG Oral Tab, Take 1 tablet (500 mg total) by mouth daily., Disp: , Rfl:     ALLERGIES:   Allergies   Allergen Reactions    Celebrex [Celecoxib] PALPITATIONS    Penicillins ITCHING and SWELLING    Metformin And Related UNKNOWN    Olmesartan UNKNOWN       SOCIAL HISTORY:   Social History     Socioeconomic History    Marital status:    Tobacco Use    Smoking status: Former     Current packs/day: 0.00     Types: Cigarettes     Quit date:      Years since quittin.6     Passive exposure: Never    Smokeless tobacco: Never    Tobacco comments:     [id number]   Vaping Use    Vaping status: Never Used   Substance and Sexual Activity    Alcohol use: Never    Drug use: Never     PAST MEDICAL HISTORY:   Past Medical History:    Anxiety state    Cancer (HCC)    breast cancer 2018    Chronic obstructive pulmonary disease, unspecified (HCC)    Chronic obstructive pulmonary disease, unspecified (HCC)    COPD (chronic obstructive pulmonary disease) (HCC)    Diabetes (HCC)    Diabetes mellitus (HCC)    Essential hypertension    Exposure to medical diagnostic radiation    High blood pressure    High cholesterol    History of blood transfusion    low hemoglobin    Osteoarthritis    PONV (postoperative nausea and vomiting)    Pulmonary embolism (HCC)    Pulmonary emphysema (HCC)    Rheumatoid arthritis (HCC)       PAST SURGICAL HISTORY:   Past Surgical History:   Procedure Laterality Date    Cataract extraction w/  intraocular lens implant Bilateral 2017    Dr in [facility]     Colonoscopy      Colonoscopy N/A 2022    Procedure: COLONOSCOPY;  Surgeon: Mikey Garcia MD;  Location: [facility] ENDOSCOPY    Colonoscopy N/A 06/15/2023    Procedure: COLONOSCOPY;  Surgeon: Mikey Garcia MD;  Location: [facility] ENDOSCOPY     Correct bunion,simple      right foot  1993    Egd  12/21/2023    Dr. Garcia; Duodenal AVM's x4 cauterized    Hysterectomy      1972, no abnormal Paps, due to heavy bleeding with fibroids.  Not sure if it had bilateral salpingo-oophorectomy.    Ir aneurysm repairm  2010    Computer assisted volumetric craniofomy with clipping of anterior cerebral artery.    Lumpectomy right      Radiation right      Rotator cuff repair      1993    Total knee replacement Right 2010    Total knee replacement Left 07/02/2015    Transoral incisionless fundoplication - internal  01/25/2012 Fredy fundoplication at [facility] Dr. Fournier.    Fredy fundoplication at [facility] Dr. Fournier.    Trigger finger release      left hand  2005    Yag capsulotomy - ou - both eyes Bilateral 09/2021    done in [place]       PHYSICAL EXAM:   Vitals:    08/27/24 1345 08/27/24 1457   BP: (!) 179/92 145/80   Pulse: 80 79   Weight: 144 lb 6.4 oz (65.5 kg)    Height: 5' 1\" (1.549 m)      BMI:   Body mass index is 27.28 kg/m².    General Appearance:  alert, well developed, in no acute distress  Nutritional:  no extreme weight gain or loss  Head: Atraumatic  Eyes:  normal conjunctivae, sclera., normal sclera and normal pupils  Throat/Neck: normal sound to voice. Normal hearing, normal speech  Back: no kyphosis  Respiratory:  Speaking in full sentences, non-labored. no increased work of breathing, no audible wheezing    Skin:  normal moisture and skin texture, no visible lesions  Hair and nails: normal scalp hair  Hematologic:  no excessive bruising  Neuro: motor grossly intact, moving all extremities without difficulty  Psychiatric:  oriented to time, self, and place  Extremities: no obvious extremity swelling, no lesions    LABS: Pertinent labs reviewed    ASSESSMENT/PLAN:    -Reviewed with patient the pathogenesis of diabetes, clinical significance of A1c, and common complications such as: microvascular, macrovascular and diabetic  ketoacidosis. Patient verbalizes understanding of the importance of glycemic control and the goals of therapy.   Per ADA 2024 guidelines, it is recommended that older adults (age 65 and above) who are healthy with few coexisting chronic illnesses, intact cognitive and functional status, should have A1C goal of <7.5%, fasting or preprandial glucose of  and bedtime glucose of .     1.Type 2 Diabetes Mellitus, uncontrolled   -LAB DATA  HbA,C: 10.3% today   a) Medications  - continue with Glipizide 5mg daily with breakfast   - increase Lantus 10 --> 16 units nightly - Risks and benefits reviewed. Verbalizes understanding.    - reviewed options of glp-1 or sglt-2 medications. If variability continues will start one of these medications.   -patient already has some edema to lower extremities and would like to avoid pioglitazone medication.   - discussed that she gives me an update on her glucose readings in 2 weeks. Sooner if she develops readings <80.     - reviewed importance to limiting carbs to 45gm per meal and also including protein with each meal.   - discussed to limit yasoo ice cream to 1 per day  - limit waffles to only 1 per meal.     -reviewed target goal BG readings and A1C  -reviewed when to call and notify me of abnormal BG readings.     b) No nephropathy: GFR: 37 2024 and urine MA: 120.7 on 3/10/2023 -->reminded to repeat urine MA  c) UTD with optho - last apt with Dr. Melo was 2024  D)foot exam: abnormal on 2/15/2024  e) cont using freestyle van 3 cgm   f) Life style changes reviewed    2.dyslipidemia   -LDL: 102 and Tri on 2023  -Atorvastatin 80mg at bedtime      RTC in 6 weeks   Patient instructed to call sooner if they develop Blood glucose readings <75 and/or if they have readings persistently >200.     The risks and benefits of my recommendations were discussed with the patient today. questions were also answered to the best of my knowledge. Patient verbalizes  understanding of these issues and agrees to the plan.    8/27/2024  BILL Swain

## 2024-08-27 NOTE — PATIENT INSTRUCTIONS
A1C: 10.3% today --> previously was 10.3% on 7/19/2024  Blood glucose: 288 in clinic today    Medications:   - continue with Glipizide 5mg with breakfast   - increase Lantus 10 --> 16 units with dinner     - let's work on decreasing amount of sweets   - decrease waffle intake to 1 per meal      Blood sugar testing:  Continue using Freestyle van 3 continuous glucometer     Blood sugar targets:  Before breakfast:  (preferably < 110)   2 hours after meals: <180 (preferably <150)     Call for persistent blood sugars < 75 or > 200

## 2024-08-27 NOTE — PROGRESS NOTES
Name: Chester Bautista  YOB: 1940  Report Period: 08/14/2024 - 08/27/2024 (14 days)  Generated: 08/27/2024  % Time CGM Active: 97%      Glucose Statistics and Targets  Average Glucose: 236 mg/dL  Glucose Management Indicator (GMI): 9.0%  Glucose Variability (%CV): 30.6%  Target Range: 70 - 180 mg/dL      Time in Ranges  Very High: >250 mg/dL --- 42%  High: 181 - 250 mg/dL --- 34%  Target Range: 70 - 180 mg/dL --- 24%  Low: 54 - 69 mg/dL --- 0%  Very Low: <54 mg/dL --- 0%

## 2024-08-28 RX ORDER — CYCLOBENZAPRINE HCL 10 MG
10 TABLET ORAL NIGHTLY PRN
Qty: 90 TABLET | Refills: 1 | Status: SHIPPED | OUTPATIENT
Start: 2024-08-28

## 2024-08-28 NOTE — TELEPHONE ENCOUNTER
Please Review. Protocol Failed; No Protocol     Requested Prescriptions   Pending Prescriptions Disp Refills    CYCLOBENZAPRINE 10 MG Oral Tab [Pharmacy Med Name: CYCLOBENZAPRINE 10 MG TABLET] 90 tablet 1     Sig: TAKE 1 TABLET BY MOUTH EVERY DAY NIGHTLY AS NEEDED       There is no refill protocol information for this order            Future Appointments         Provider Department Appt Notes    In 2 days [facility]idget NEVAREZTanvir [facility] - Infusion OCTREOTIDE INJECTION    In 1 week [facility] Bridget W. Chen [facility] - Infusion -OCTREOTIDE-CBC-FERRITIN-IRON-PORT-cl    In 3 weeks Chapin Parker MD [facility] yearly    In 4 weeks Eric Mohamud MD [facility] Hematology Oncology 2 m [email or web address]    In 1 month Vic Thakkar APRN [facility]     In 1 month Chapin Parker MD [facility] 4 - 6 mos    In 2 months Hannah Encinas MD [facility] 3 month follow up          Recent Outpatient Visits              Yesterday Uncontrolled type 2 diabetes mellitus with hyperglycemia (HCC)    [facility] Vic Thakkar APRN    Office Visit    3 weeks ago DARWIN (acute kidney injury) (HCC)    [facility] Hannah Encinas MD    Office Visit    3 weeks ago History of iron deficiency anemia    [facility] Hematology Oncology Eric Mohamud MD    Office Visit    3 weeks ago Encounter for care related to vascular access port    [facility] - Infusion    Nurse Only    1 month ago Glaucoma suspect of both eyes    [facility]    Nurse Only

## 2024-08-30 ENCOUNTER — APPOINTMENT (OUTPATIENT)
Dept: HEMATOLOGY/ONCOLOGY | Facility: HOSPITAL | Age: 84
End: 2024-08-30
Attending: INTERNAL MEDICINE
Payer: MEDICARE

## 2024-09-05 DIAGNOSIS — Z86.2 HISTORY OF IRON DEFICIENCY ANEMIA: Primary | ICD-10-CM

## 2024-09-06 ENCOUNTER — NURSE ONLY (OUTPATIENT)
Dept: HEMATOLOGY/ONCOLOGY | Facility: HOSPITAL | Age: 84
End: 2024-09-06
Attending: INTERNAL MEDICINE
Payer: MEDICARE

## 2024-09-06 DIAGNOSIS — D50.0 IRON DEFICIENCY ANEMIA DUE TO CHRONIC BLOOD LOSS: ICD-10-CM

## 2024-09-06 DIAGNOSIS — Q27.30 AVM (ARTERIOVENOUS MALFORMATION) (HCC): ICD-10-CM

## 2024-09-06 DIAGNOSIS — Z86.2 HISTORY OF IRON DEFICIENCY ANEMIA: ICD-10-CM

## 2024-09-06 DIAGNOSIS — Z45.2 ENCOUNTER FOR CARE RELATED TO VASCULAR ACCESS PORT: Primary | ICD-10-CM

## 2024-09-06 DIAGNOSIS — M05.79 RHEUMATOID ARTHRITIS INVOLVING MULTIPLE SITES WITH POSITIVE RHEUMATOID FACTOR (HCC): ICD-10-CM

## 2024-09-06 DIAGNOSIS — K90.9 MALABSORPTION OF IRON (HCC): ICD-10-CM

## 2024-09-06 LAB
ALBUMIN SERPL-MCNC: 3.6 G/DL (ref 3.2–4.8)
ALT SERPL-CCNC: 11 U/L
AST SERPL-CCNC: 24 U/L (ref ?–34)
BASOPHILS # BLD AUTO: 0.06 X10(3) UL (ref 0–0.2)
BASOPHILS NFR BLD AUTO: 0.7 %
CREAT BLD-MCNC: 0.98 MG/DL
CRP SERPL-MCNC: 2.9 MG/DL (ref ?–1)
DEPRECATED HBV CORE AB SER IA-ACNC: 51.1 NG/ML
DEPRECATED RDW RBC AUTO: 52.8 FL (ref 35.1–46.3)
EGFRCR SERPLBLD CKD-EPI 2021: 57 ML/MIN/1.73M2 (ref 60–?)
EOSINOPHIL # BLD AUTO: 0.24 X10(3) UL (ref 0–0.7)
EOSINOPHIL NFR BLD AUTO: 2.8 %
ERYTHROCYTE [DISTWIDTH] IN BLOOD BY AUTOMATED COUNT: 16.6 % (ref 11–15)
ERYTHROCYTE [SEDIMENTATION RATE] IN BLOOD: 83 MM/HR
HCT VFR BLD AUTO: 30.5 %
HGB BLD-MCNC: 9.3 G/DL
IMM GRANULOCYTES # BLD AUTO: 0.1 X10(3) UL (ref 0–1)
IMM GRANULOCYTES NFR BLD: 1.2 %
IRON SATN MFR SERPL: 11 %
IRON SERPL-MCNC: 36 UG/DL
LYMPHOCYTES # BLD AUTO: 1.56 X10(3) UL (ref 1–4)
LYMPHOCYTES NFR BLD AUTO: 18.2 %
MCH RBC QN AUTO: 26.7 PG (ref 26–34)
MCHC RBC AUTO-ENTMCNC: 30.5 G/DL (ref 31–37)
MCV RBC AUTO: 87.6 FL
MONOCYTES # BLD AUTO: 0.74 X10(3) UL (ref 0.1–1)
MONOCYTES NFR BLD AUTO: 8.6 %
NEUTROPHILS # BLD AUTO: 5.87 X10 (3) UL (ref 1.5–7.7)
NEUTROPHILS # BLD AUTO: 5.87 X10(3) UL (ref 1.5–7.7)
NEUTROPHILS NFR BLD AUTO: 68.5 %
PLATELET # BLD AUTO: 265 10(3)UL (ref 150–450)
RBC # BLD AUTO: 3.48 X10(6)UL
TIBC SERPL-MCNC: 328 UG/DL (ref 250–425)
TRANSFERRIN SERPL-MCNC: 220 MG/DL (ref 250–380)
WBC # BLD AUTO: 8.6 X10(3) UL (ref 4–11)

## 2024-09-06 PROCEDURE — 96372 THER/PROPH/DIAG INJ SC/IM: CPT

## 2024-09-06 PROCEDURE — 86140 C-REACTIVE PROTEIN: CPT

## 2024-09-06 PROCEDURE — 82040 ASSAY OF SERUM ALBUMIN: CPT

## 2024-09-06 PROCEDURE — 83540 ASSAY OF IRON: CPT

## 2024-09-06 PROCEDURE — 84460 ALANINE AMINO (ALT) (SGPT): CPT

## 2024-09-06 PROCEDURE — 84450 TRANSFERASE (AST) (SGOT): CPT

## 2024-09-06 PROCEDURE — 82728 ASSAY OF FERRITIN: CPT

## 2024-09-06 PROCEDURE — 85652 RBC SED RATE AUTOMATED: CPT

## 2024-09-06 PROCEDURE — 84466 ASSAY OF TRANSFERRIN: CPT

## 2024-09-06 PROCEDURE — 82565 ASSAY OF CREATININE: CPT

## 2024-09-06 PROCEDURE — 85025 COMPLETE CBC W/AUTO DIFF WBC: CPT

## 2024-09-06 RX ORDER — HEPARIN SODIUM (PORCINE) LOCK FLUSH IV SOLN 100 UNIT/ML 100 UNIT/ML
5 SOLUTION INTRAVENOUS ONCE
OUTPATIENT
Start: 2024-09-27

## 2024-09-06 RX ORDER — SODIUM CHLORIDE 9 MG/ML
10 INJECTION, SOLUTION INTRAMUSCULAR; INTRAVENOUS; SUBCUTANEOUS ONCE
OUTPATIENT
Start: 2024-09-27

## 2024-09-12 ENCOUNTER — TELEPHONE (OUTPATIENT)
Dept: INTERNAL MEDICINE CLINIC | Facility: CLINIC | Age: 84
End: 2024-09-12

## 2024-09-26 ENCOUNTER — OFFICE VISIT (OUTPATIENT)
Dept: HEMATOLOGY/ONCOLOGY | Facility: HOSPITAL | Age: 84
End: 2024-09-26
Attending: INTERNAL MEDICINE
Payer: MEDICARE

## 2024-09-26 VITALS
HEIGHT: 61 IN | RESPIRATION RATE: 16 BRPM | HEART RATE: 106 BPM | DIASTOLIC BLOOD PRESSURE: 82 MMHG | BODY MASS INDEX: 27.38 KG/M2 | TEMPERATURE: 98 F | OXYGEN SATURATION: 98 % | WEIGHT: 145 LBS | SYSTOLIC BLOOD PRESSURE: 188 MMHG

## 2024-09-26 DIAGNOSIS — D50.0 IRON DEFICIENCY ANEMIA DUE TO CHRONIC BLOOD LOSS: Primary | ICD-10-CM

## 2024-09-26 DIAGNOSIS — Q27.30 AVM (ARTERIOVENOUS MALFORMATION) (HCC): ICD-10-CM

## 2024-09-26 DIAGNOSIS — K90.9 MALABSORPTION OF IRON (HCC): ICD-10-CM

## 2024-09-26 DIAGNOSIS — Z86.711 HISTORY OF PULMONARY EMBOLISM: ICD-10-CM

## 2024-09-26 PROCEDURE — 99214 OFFICE O/P EST MOD 30 MIN: CPT | Performed by: INTERNAL MEDICINE

## 2024-09-26 NOTE — PROGRESS NOTES
Cancer Center Progress Note    Patient Name: hCester Bautista   YOB: 1940   Medical Record Number: [id number]     Chief Complaint:  BUZZ, hx of PE      Oncology History:  84 year old with chronic iron deficiency anemia in the setting of bleeding AVMs s/p repeat endoscopic treatment, Iv iron treatment and recent RBC transfusion for hgb of 6 on 5/2022. EGD/CLN Dr Garcia 7/2022 noted for 2 AVM in duodenum and 1 AVM in the colon s/p treatment.  There is ongoing concern for more AVMs as well need for chronic monitoring, infusions etc.      History is noted for RA managed by Dr Goodrich, hepatitis B core antibody positivity, and remote history of breast cancer (records not available).     Pt had an infusion 2/2023 injectafer.     5/19/23: reported to ED due to rectal bleeding and abd pain, has CLN planned.    In early 2/24 rec for ER eval in light of the critically HTN. She was treated for pneumonia. Prudencio was then found to have PE and GI bleeding. She was treated with IV heparin and required cauterization twice for GI bleeding, so never started on Apixaban and held from anticoagulation.    9/26/2024  Interim  Patient presents for f/u of BUZZ. She is on monthly octreotide by GI for GI bleeding concerns.     Prudencio's fatigue and dyspnea on exertion are stable, but no chest pain. Pt denies any systemic signs of illness.     Patient denies blood loss from any orifice that she can see. Prudencio underwent bidirectional scoping with Dr. Aundrea Garcia in GI in June 2023 were several areas of AVM were cauterized.     Past Medical History: history of breast cancer, BUZZ, bleeding AVM, diabetes, HTN, HLD, RA  Past Surgical History: Hysterectomy 1972, IR aneurysm repair, right lumpectomy, knee replacements  Family History: neg for malignancy  Social History: lives with naif, able to do all ADLs, no smoking or ETOH    Current Outpatient Medications:     cyclobenzaprine 10 MG Oral Tab, Take 1 tablet (10 mg total) by  mouth nightly as needed., Disp: 90 tablet, Rfl: 1    predniSONE 5 MG Oral Tab, Take 1 tablet (5 mg total) by mouth daily., Disp: 90 tablet, Rfl: 0    hydroxychloroquine 200 MG Oral Tab, Take 1 tablet (200 mg total) by mouth daily., Disp: 90 tablet, Rfl: 0    BD PEN NEEDLE KADEN 2ND GEN 32G X 4 MM Does not apply Misc, 1 EACH DAILY. USE DAILY WITH INSULIN, Disp: 100 each, Rfl: 1    insulin glargine (LANTUS SOLOSTAR) 100 UNIT/ML Subcutaneous Solution Pen-injector, Inject 10 Units into the skin daily with dinner., Disp: 3 mL, Rfl: 1    glipiZIDE 5 MG Oral Tab, Take 1 tablet (5 mg total) by mouth every morning before breakfast., Disp: 30 tablet, Rfl: 1    famotidine 40 MG Oral Tab, Take 1 tablet (40 mg total) by mouth 2 (two) times daily., Disp: , Rfl:     montelukast 10 MG Oral Tab, Take 1 tablet (10 mg total) by mouth nightly., Disp: 90 tablet, Rfl: 3    torsemide 20 MG Oral Tab, Take 1 tablet (20 mg total) by mouth daily., Disp: , Rfl:     valsartan 80 MG Oral Tab, Take 1 tablet (80 mg total) by mouth daily., Disp: 90 tablet, Rfl: 3    tamsulosin 0.4 MG Oral Cap, Take 1 capsule (0.4 mg total) by mouth daily., Disp: 90 capsule, Rfl: 1    carvedilol 12.5 MG Oral Tab, Take 1 tablet (12.5 mg total) by mouth 2 (two) times daily with meals., Disp: 180 tablet, Rfl: 3    fluticasone-salmeterol (WIXELA INHUB) 250-50 MCG/ACT Inhalation Aerosol Powder, Breath Activated, Inhale 1 puff into the lungs every 12 (twelve) hours., Disp: 1 each, Rfl: 5    Lancets Does not apply Misc, Dx. E11.9. Checks qam., Disp: 50 each, Rfl: 6    Blood Glucose Monitoring Suppl (BLOOD GLUCOSE SYSTEM GILLES) Does not apply Kit, Dx. E11.9. Checks qam., Disp: 1 kit, Rfl: 0    Blood Glucose Monitoring Suppl w/Device Does not apply Kit, Dx. E11.9. Checks qam., Disp: 1 kit, Rfl: 0    Blood Gluc Meter Disp-Strips Does not apply Device, Dx. E11.9. Checks qam., Disp: 100 each, Rfl: 0    gabapentin 100 MG Oral Cap, Take 1 capsule (100 mg total) by mouth nightly.,  Disp: 90 capsule, Rfl: 3    midodrine 2.5 MG Oral Tab, Take 1 tablet (2.5 mg total) by mouth in the morning and 1 tablet (2.5 mg total) at noon and 1 tablet (2.5 mg total) in the evening. Hold if BP > 120/80.., Disp: 90 tablet, Rfl: 0    LORATADINE 10 MG Oral Tab, TAKE 1 TABLET BY MOUTH EVERY DAY, Disp: 90 tablet, Rfl: 1    Accu-Chek FastClix Lancets Does not apply Misc, Check sugar once daily as directed (E11.42), Disp: 100 each, Rfl: 2    Glucose Blood (ACCU-CHEK GUIDE) In Vitro Strip, Check blood sugar twice a day (fasting and before dinner). DX: E11.42, NIDDM, Disp: 200 strip, Rfl: 1    rosuvastatin (CRESTOR) 20 MG Oral Tab, Take 1 tablet (20 mg total) by mouth nightly., Disp: 90 tablet, Rfl: 0    glycopyrrolate 1 MG Oral Tab, Take 1 tablet (1 mg total) by mouth 3 (three) times daily., Disp: 90 tablet, Rfl: 1    albuterol 108 (90 Base) MCG/ACT Inhalation Aero Soln, inhale 2 puff by inhalation route  every 4 - 6 hours as needed, Disp: 1 each, Rfl: 0    albuterol 108 (90 Base) MCG/ACT Inhalation Aero Soln, inhale 2 puff by inhalation route  every 4 - 6 hours as needed, Disp: 1 each, Rfl: 5    Blood Glucose Monitoring Suppl (ONETOUCH VERIO) w/Device Does not apply Kit, 1 each daily. Test 1x daily, Disp: 1 kit, Rfl: 0    Glucose Blood (ONETOUCH VERIO) In Vitro Strip, Test 1x daily, Disp: 100 strip, Rfl: 1    OneTouch Delica Lancets 33G Does not apply Misc, 4 x daily, Disp: 100 each, Rfl: 1    Blood Glucose Monitoring Suppl (BLOOD GLUCOSE SYSTEM GILLES) Does not apply Kit, DX E 11.9.  Checks every morning., Disp: 1 kit, Rfl: 0    Lancets Does not apply Misc, DX E 11.9.  Checks every morning., Disp: 50 each, Rfl: 11    Blood Glucose Monitoring Suppl w/Device Does not apply Kit, DX E 11.9.  Checks every morning., Disp: 1 kit, Rfl: 0    acetaminophen 500 MG Oral Tab, Take 1 tablet (500 mg total) by mouth daily., Disp: , Rfl:     Allergies:  Allergies   Allergen Reactions    Celebrex [Celecoxib] PALPITATIONS    Penicillins  ITCHING and SWELLING    Metformin And Related UNKNOWN    Olmesartan UNKNOWN        Review of Systems:Oncology specific ROS negative except as per HPI    BP (!) 188/82 (BP Location: Left arm, Patient Position: Sitting, Cuff Size: adult)   Pulse 106   Temp 98.2 °F (36.8 °C) (Oral)   Resp 16   Ht 1.549 m (5' 1\")   Wt 65.8 kg (145 lb)   SpO2 98%   BMI 27.40 kg/m²   Wt Readings from Last 6 Encounters:   09/26/24 65.8 kg (145 lb)   08/27/24 65.5 kg (144 lb 6.4 oz)   08/06/24 63 kg (139 lb)   08/02/24 64 kg (141 lb)   07/19/24 63.7 kg (140 lb 6.4 oz)   06/12/24 64.4 kg (142 lb)   General: Patient is alert and oriented x 3, not in acute distress.  HEENT: EOMI, MMM  Chest: Clear to auscultation bilaterally.  Abdomen: soft, non tender, non distended  Extremities: no signs of LE edema  Neurological: Strength is grossly intact. Moving all extremities. Gait appropriate.   Psych/Depression: Appropriate mood and affect          Lab Results   Component Value Date    WBC 8.6 09/06/2024    RBC 3.48 (L) 09/06/2024    HGB 9.3 (L) 09/06/2024    HCT 30.5 (L) 09/06/2024    MCV 87.6 09/06/2024    MCH 26.7 09/06/2024    MCHC 30.5 (L) 09/06/2024    RDW 16.6 (H) 09/06/2024    .0 09/06/2024     Lab Results   Component Value Date     (H) 08/02/2024    BUN 21 08/02/2024    BUNCREA 19.6 08/02/2024    CREATSERUM 0.98 09/06/2024    ANIONGAP 6 08/02/2024    GFRNAA 47 (L) 05/02/2022    GFRAA 54 (L) 05/02/2022    CA 9.2 08/02/2024    OSMOCALC 289 08/02/2024    ALKPHO 92 06/20/2024    AST 24 09/06/2024    ALT 11 09/06/2024    BILT 0.2 06/20/2024    TP 7.1 06/20/2024    ALB 3.6 09/06/2024    GLOBULIN 3.5 06/20/2024     08/02/2024    K 3.9 08/02/2024     08/02/2024    CO2 26.0 08/02/2024       Impression and Plan:  84 year old with chronic iron deficiency anemia in the setting of bleeding AVMs s/p repeat endoscopic treatment, Iv iron treatment and rbc tfx 5/2022    1.) BUZZ from AVM's    - previously CAPUTO has been a  hallmark for symptomatic anemia for her, also has worsening fatigue and some lightheadedness  --pt received venofer in 4/24 and currently feels improved    --current iron studies reviewed w/pt and daughter; needs  IV iron; orders placed for venofer x 3  -- f//u in 8 weeks     --pt has improved symptoms with monthly octreotide by Dr. Garcia in GI for continued blood loss; continue care and management    2.) HTN  --remains elevated; improved, she will f/u with her PCP    3) History of PE  --found on 2/23/24; not on anticoagulation in light of recent GI bleeding   --IVC filter placed on 3/7/24 by IR; she was rec for IVC filter retrial in 3-6 mo by IR, so this was removed  in 6/24  --her PE was noted to be resolved by CT chest on 4/26    MDM: Moderate Risk; ongoing likely GI bleeding from AVM's, HTN, hx of PE    Eric Mohamud MD  [facility]  [place] [facility] [address], [place] [address]

## 2024-10-03 NOTE — TELEPHONE ENCOUNTER
Please check with patient to see if she is on any more as far as I know she was off of it it was a temporary?

## 2024-10-03 NOTE — TELEPHONE ENCOUNTER
pls advise, thanks in advance.   There is a high alert notification that I am not permitted to override.    Drug-Allergy Interaction Report    CELECOXIB     Significance: Level 3     Allergen: CELECOXIB  Type: None specified  Severity: High  Reactions: PALPITATIONS    Refill Passed Per Protocol    Requested Prescriptions   Pending Prescriptions Disp Refills    tamsulosin 0.4 MG Oral Cap [Pharmacy Med Name: TAMSULOSIN HCL 0.4 MG CAPSULE] 90 capsule 3     Sig: Take 1 capsule (0.4 mg total) by mouth daily.       Genitourinary Medications Passed - 10/3/2024 10:37 AM        Passed - Patient does not have pulmonary hypertension on problem list        Passed - In person appointment or virtual visit in the past 12 mos or appointment in next 3 mos     Recent Outpatient Visits              1 week ago Iron deficiency anemia due to chronic blood loss    [facility] Hematology Oncology Eric Mohamud MD    Office Visit    3 weeks ago Encounter for care related to vascular access port    [facility]    Nurse Only    1 month ago Uncontrolled type 2 diabetes mellitus with hyperglycemia (HCC)    [facility] Vic Thakkar APRN    Office Visit    1 month ago DARWIN (acute kidney injury) ([facility])    [facility] Hannah Encinas MD    Office Visit    2 months ago History of iron deficiency anemia    [facility] Hematology Oncology Eric Mohamud MD    Office Visit          Future Appointments         Provider Department Appt Notes    Tomorrow EM CC LAB2 [facility] - Infusion -OCTREOTIDE-CBC-FERRITIN-IRON-PORT-cl    In 5 days [facility]     In 1 week Chapin Parker MD [facility] 4 - 6 mos    In 1 month Hannah Encinas MD [facility] 3  month follow up    In 1 month Ashia Goodrich MD [facility], [facility] follow up    In 1 month [facility] FOLLOW UP VISIT.CL  8w                         Future Appointments         Provider Department Appt Notes    Tomorrow EM CC LAB2 [facility] - Infusion -OCTREOTIDE-CBC-FERRITIN-IRON-PORT-cl    In 5 days Vic Thakkar APRN [facility], [facility], [place]     In 1 week Chapin Parker MD [facility] [place] 4 - 6 mos    In 1 month Hannah Encinas MD [facility] 3 month follow up    In 1 month Ashia Goodrich MD [facility] follow up    In 1 month [facility] Hematology Oncology FOLLOW UP VISIT.CL  8w          Recent Outpatient Visits              1 week ago Iron deficiency anemia due to chronic blood loss    [facility] Hematology Oncology Eric Mohamud MD    Office Visit    3 weeks ago Encounter for care related to vascular access port    [facility]    [facility]    1 month ago Uncontrolled type 2 diabetes mellitus with hyperglycemia (HCC)    [facility] [place] IsVic babcock APRN    Office Visit    1 month ago DARWIN (acute kidney injury) (HCC)    [facility] Hannah Encinas MD    Office Visit    2 months ago History of iron deficiency anemia    [facility] Hematology Oncology Eric Mohamud MD    Office Visit

## 2024-10-03 NOTE — TELEPHONE ENCOUNTER
Please review pended refill request as unable to refill due to high/very high interaction warning copied here:    High  Allergy/Contraindication: tamsulosinReactions: PALPITATIONS. No reaction type specified. User documented allergy severity: High.  Level 3 with CELECOXIB (Class: CELECOXIB).  Details    Requested Prescriptions   Pending Prescriptions Disp Refills    TAMSULOSIN 0.4 MG Oral Cap [Pharmacy Med Name: TAMSULOSIN HCL 0.4 MG CAPSULE] 90 capsule 1     Sig: TAKE 1 CAPSULE BY MOUTH EVERY DAY       Genitourinary Medications Passed - 10/1/2024 12:28 AM        Passed - Patient does not have pulmonary hypertension on problem list        Passed - In person appointment or virtual visit in the past 12 mos or appointment in next 3 mos     Recent Outpatient Visits              1 week ago Iron deficiency anemia due to chronic blood loss    [facility] Hematology Oncology Eric Mohamud MD    Office Visit    3 weeks ago Encounter for care related to vascular access port    [facility] - Infusion    Nurse Only    1 month ago Uncontrolled type 2 diabetes mellitus with hyperglycemia (HCC)    [facility]    Office Visit    1 month ago DARWIN (acute kidney injury) ([facility])    [facility] Hannah Encinas MD    Office Visit    2 months ago History of iron deficiency anemia    [facility] Hematology Oncology Eric Mohamud MD    Office Visit          Future Appointments         Provider Department Appt Notes    Tomorrow EM CC LAB2 [facility] - Infusion -OCTREOTIDE-CBC-FERRITIN-IRON-PORT-cl    In 5 days Vic Thakkar APRN [facility]     In 1 week Chapin Parker MD [facility] 4 - 6 mos    In 1 month Hannah Encinas MD [facility] 3 month  follow up    In 1 month Ashia Goodrich MD [facility], [facility] follow up    In 1 month [facility] FOLLOW UP VISIT.CL  8w                           Future Appointments         Provider Department Appt Notes    Tomorrow EM CC LAB2 Bridget LOPEZ [facility] - Infusion -OCTREOTIDE-CBC-FERRITIN-IRON-PORT-cl    In 5 days Vic Thakkar APRN [facility], [facility], [place]     In 1 week [facility] 4 - 6 mos    In 1 month Hannah Encinas MD [facility] 3 month follow up    In 1 month Ashia Goodrich MD [facility] follow up    In 1 month [facility] H[facility]ology Oncology FOLLOW UP VISIT.CL  8w          Recent Outpatient Visits              1 week ago Iron deficiency anemia due to chronic blood loss    [facility] Hematology Oncology Eric Mohamud MD    Office Visit    3 weeks ago Encounter for care related to vascular access port    [facility]    Nurse Only    1 month ago Uncontrolled type 2 diabetes mellitus with hyperglycemia (HCC)    [facility] [place] IsVic babcock, BILL    Office Visit    1 month ago DARWIN (acute kidney injury) (HCC)    [facility] Hannah Encinas MD    Office Visit    2 months ago History of iron deficiency anemia    [facility] Hematology Oncology Eric Mohamud MD    Office Visit

## 2024-10-03 NOTE — TELEPHONE ENCOUNTER
Spoke with patient's daughter per MANUEL, Date of Birth verified.  She was informed of Dr. Sorto message, she will check with patient if she still taking it. She knew it was stopped by Nephro before but she will verify with pt.   Also patient started having swelling on her feet for 2 weeks now.    She will call back with an update.

## 2024-10-04 ENCOUNTER — NURSE ONLY (OUTPATIENT)
Dept: HEMATOLOGY/ONCOLOGY | Facility: HOSPITAL | Age: 84
End: 2024-10-04
Attending: INTERNAL MEDICINE
Payer: MEDICARE

## 2024-10-04 DIAGNOSIS — D50.9 IRON DEFICIENCY ANEMIA, UNSPECIFIED IRON DEFICIENCY ANEMIA TYPE: ICD-10-CM

## 2024-10-04 DIAGNOSIS — K90.9 MALABSORPTION OF IRON (HCC): ICD-10-CM

## 2024-10-04 DIAGNOSIS — Z45.2 ENCOUNTER FOR CARE RELATED TO VASCULAR ACCESS PORT: Primary | ICD-10-CM

## 2024-10-04 DIAGNOSIS — D50.0 IRON DEFICIENCY ANEMIA DUE TO CHRONIC BLOOD LOSS: ICD-10-CM

## 2024-10-04 DIAGNOSIS — Q27.30 AVM (ARTERIOVENOUS MALFORMATION) (HCC): ICD-10-CM

## 2024-10-04 LAB
BASOPHILS # BLD AUTO: 0.06 X10(3) UL (ref 0–0.2)
BASOPHILS NFR BLD AUTO: 0.4 %
DEPRECATED RDW RBC AUTO: 50.7 FL (ref 35.1–46.3)
EOSINOPHIL # BLD AUTO: 0.24 X10(3) UL (ref 0–0.7)
EOSINOPHIL NFR BLD AUTO: 1.8 %
ERYTHROCYTE [DISTWIDTH] IN BLOOD BY AUTOMATED COUNT: 16.5 % (ref 11–15)
HCT VFR BLD AUTO: 28.8 %
HGB BLD-MCNC: 8.8 G/DL
IMM GRANULOCYTES # BLD AUTO: 0.1 X10(3) UL (ref 0–1)
IMM GRANULOCYTES NFR BLD: 0.7 %
LYMPHOCYTES # BLD AUTO: 1.27 X10(3) UL (ref 1–4)
LYMPHOCYTES NFR BLD AUTO: 9.4 %
MCH RBC QN AUTO: 25.9 PG (ref 26–34)
MCHC RBC AUTO-ENTMCNC: 30.6 G/DL (ref 31–37)
MCV RBC AUTO: 84.7 FL
MONOCYTES # BLD AUTO: 1.14 X10(3) UL (ref 0.1–1)
MONOCYTES NFR BLD AUTO: 8.4 %
NEUTROPHILS # BLD AUTO: 10.69 X10 (3) UL (ref 1.5–7.7)
NEUTROPHILS # BLD AUTO: 10.69 X10(3) UL (ref 1.5–7.7)
NEUTROPHILS NFR BLD AUTO: 79.3 %
PLATELET # BLD AUTO: 371 10(3)UL (ref 150–450)
RBC # BLD AUTO: 3.4 X10(6)UL
WBC # BLD AUTO: 13.5 X10(3) UL (ref 4–11)

## 2024-10-04 PROCEDURE — 96372 THER/PROPH/DIAG INJ SC/IM: CPT

## 2024-10-04 PROCEDURE — 36591 DRAW BLOOD OFF VENOUS DEVICE: CPT

## 2024-10-04 PROCEDURE — 80053 COMPREHEN METABOLIC PANEL: CPT | Performed by: INTERNAL MEDICINE

## 2024-10-04 PROCEDURE — 85025 COMPLETE CBC W/AUTO DIFF WBC: CPT

## 2024-10-04 RX ORDER — HEPARIN SODIUM (PORCINE) LOCK FLUSH IV SOLN 100 UNIT/ML 100 UNIT/ML
5 SOLUTION INTRAVENOUS ONCE
OUTPATIENT
Start: 2024-10-05

## 2024-10-04 RX ORDER — SODIUM CHLORIDE 9 MG/ML
10 INJECTION, SOLUTION INTRAMUSCULAR; INTRAVENOUS; SUBCUTANEOUS ONCE
OUTPATIENT
Start: 2024-10-05

## 2024-10-04 RX ORDER — TAMSULOSIN HYDROCHLORIDE 0.4 MG/1
0.4 CAPSULE ORAL DAILY
Qty: 90 CAPSULE | Refills: 3 | OUTPATIENT
Start: 2024-10-04

## 2024-10-04 RX ORDER — LORATADINE 10 MG/1
TABLET ORAL
Qty: 30 TABLET | Refills: 0 | Status: SHIPPED | OUTPATIENT
Start: 2024-10-04

## 2024-10-04 NOTE — TELEPHONE ENCOUNTER
Refill request sent to pharmacy on 10/4/2 for Loratadine, last OV on 10/20/23, wlll send in a 30 day supply, pt will need an appt for future refills

## 2024-10-05 NOTE — PROGRESS NOTES
CBC abormal (white blood cells and red blood cells and platelets), white blood cell count is slightly elevated.  Anemia is present and hemoglobin is slightly lower than prior.  Follow-up with Dr. Mohamud.

## 2024-10-08 ENCOUNTER — OFFICE VISIT (OUTPATIENT)
Dept: ENDOCRINOLOGY CLINIC | Facility: CLINIC | Age: 84
End: 2024-10-08

## 2024-10-08 VITALS
BODY MASS INDEX: 27.56 KG/M2 | HEART RATE: 98 BPM | SYSTOLIC BLOOD PRESSURE: 148 MMHG | DIASTOLIC BLOOD PRESSURE: 78 MMHG | WEIGHT: 146 LBS | HEIGHT: 61 IN

## 2024-10-08 DIAGNOSIS — E11.65 UNCONTROLLED TYPE 2 DIABETES MELLITUS WITH HYPERGLYCEMIA (HCC): Primary | ICD-10-CM

## 2024-10-08 DIAGNOSIS — E78.5 DYSLIPIDEMIA: ICD-10-CM

## 2024-10-08 LAB — AMB EXT GMI: 7.4 %

## 2024-10-08 PROCEDURE — 95251 CONT GLUC MNTR ANALYSIS I&R: CPT | Performed by: NURSE PRACTITIONER

## 2024-10-08 PROCEDURE — 3078F DIAST BP <80 MM HG: CPT | Performed by: NURSE PRACTITIONER

## 2024-10-08 PROCEDURE — 3008F BODY MASS INDEX DOCD: CPT | Performed by: NURSE PRACTITIONER

## 2024-10-08 PROCEDURE — 1159F MED LIST DOCD IN RCRD: CPT | Performed by: NURSE PRACTITIONER

## 2024-10-08 PROCEDURE — 99214 OFFICE O/P EST MOD 30 MIN: CPT | Performed by: NURSE PRACTITIONER

## 2024-10-08 PROCEDURE — 3077F SYST BP >= 140 MM HG: CPT | Performed by: NURSE PRACTITIONER

## 2024-10-08 PROCEDURE — 1160F RVW MEDS BY RX/DR IN RCRD: CPT | Performed by: NURSE PRACTITIONER

## 2024-10-08 NOTE — PROGRESS NOTES
Name: Chester Bautista  YOB: 1940  Report Period: 09/11/2024 - 10/08/2024 (28 days)  Generated: 10/08/2024  % Time CGM Active: 98%      Glucose Statistics and Targets  Average Glucose: 170 mg/dL  Glucose Management Indicator (GMI): 7.4%  Glucose Variability (%CV): 41.4%  Target Range: 70 - 180 mg/dL      Time in Ranges  Very High: >250 mg/dL --- 15%  High: 181 - 250 mg/dL --- 26%  Target Range: 70 - 180 mg/dL --- 56%  Low: 54 - 69 mg/dL --- 3%  Very Low: <54 mg/dL --- 0%

## 2024-10-08 NOTE — PATIENT INSTRUCTIONS
A1C: 8.3% today --> decreased from 10.3% on 8/27/2024  Blood glucose: 257 in clinic today    Medications:   - continue with Glipizide 5mg daily with first meal   -  decrease Lantus 16 --> 13 units nightly       - continue to work on following a low carb diet   - continue to stay active as currently doing

## 2024-10-08 NOTE — PROGRESS NOTES
Name: Chester Bautista  Date: 10/8/2024    CHIEF COMPLAINT   Chief Complaint   Patient presents with    Diabetes     Pt is in to follow up on diabetes, A1c check up. Joselyn report downloaded       HISTORY OF PRESENT ILLNESS   Chester Bautista is a 84 year old female who presents for follow up on diabetes management. Patient is accompanied by her daughter Tommy today.   HbA1C: 8.3% at POC today. Previously was 10.3% on 8/27/2024.   Blood glucose is: 257 in clinic today.   Patient notes that she has been feeling well and denies any health changes or taking new medications since last office visit. She continues to follow a low carb diet and staying active per usual. She has been compliant with taking all diabetes medications.     FAMILY HISTORY OF DIABETES  -paternal grandmother and paternal aunt.   DIABETES HISTORY  Diagnosed: had prediabetes since 2003; diagnosed with T2DM 11/7/21;   Prior HbA, C or glycohemoglobin were 7.2% on 11/7/2021;  7.7% 2/26/2022; 7.3% 5/2/2022; 6.0% 9/1/2022; 6.4% 3/21/2023; 8.1% 6/29/2023; 7.0% 10/18/2023; 9.5% on 2/2/2024; 9.0% 5/21/2024; 10.3% 7/19/2024; 10.3% 8/27/2024; 8.3% at POC today;     PREVIOUS MEDICATION FOR DM:  Metformin -d/c'ed due to allergic reaction - swelling to bottom of lip and tongue itching; also vomiting.   - 70/30 insulin: d/c'ed due to lack of glycemic control     CURRENT MEDICATIONS FOR DM:  Glipizide 5mg daily with breakfast  Lantus 16 units daily with dinner     HOME GLUCOSE READINGS:   Freestyle joselyn 3 CGM download reviewed     Report Period: 09/11/2024 - 10/08/2024 (28 days)  Generated: 10/08/2024  % Time CGM Active: 98%        Glucose Statistics and Targets  Average Glucose: 170 mg/dL  Glucose Management Indicator (GMI): 7.4%  Glucose Variability (%CV): 41.4%  Target Range: 70 - 180 mg/dL        Time in Ranges  Very High: >250 mg/dL --- 15%  High: 181 - 250 mg/dL --- 26%  Target Range: 70 - 180 mg/dL --- 56%  Low: 54 - 69 mg/dL --- 3%  Very  Low: <54 mg/dL --- 0%    Continuous Glucose Monitoring Interpretation    Chester Bautista has undergone continuous glucose monitoring with the personal Freestyle van 3 continuous glucose monitor.  The blood glucose tracings were evaluated for two weeks prior to office visit. Her blood glucose tracings demonstrated occasional but variable postprandial hyperglycemia. She did experience hypoglycemia during the week of evaluation.  As a result of her testing her medications were adjusted per below.     HISTORY OF DIABETES COMPLICATIONS:  History of Retinopathy: no  - last eye exam within the last 12 months: yes  History of Neuropathy: no   History of Nephropathy: no     ASSOCIATED COMPLICATIONS:   HTN: yes   Hyperlipidemia: yes   Cardiovascular Disease: no   Peripheral Vascular Disease: no     DIETARY COMPLIANCE:  Good; eating a low carb diet  - has appetite has decreased compared to previously      EXERCISE:   No     Polyuria, polyphagia, polydipsia: no   Paresthesias: no   Blurred vision: no   Recent steroids, illness or infections: yes  - on prednisone for pain - RA; has been taking since 7-9/2021    REVIEW OF SYSTEMS  Constitutional: Negative for: weight change, fever, fatigue, cold/heat intolerance  Eyes: Negative for:  Visual changes, proptosis, blurring  ENT: Negative for:  dysphagia, neck swelling, dysphonia  Respiratory: Negative for: hemoptysis, shortness of breath, cough, or dyspnea.  Cardiovascular: Negative for:  chest pain, chest discomfort, palpitations  GI: Negative for:  abdominal pain, nausea, vomiting, diarrhea, heartburn, constipation  Neurology: Negative for: headache, dizziness, syncope, numbness/tingling, or weakness.   Genito-Urinary: Negative for: dysuria, frequency or hematuria   Hematology/Lymphatics: Negative for: bruising, easy bleeding, lower extremity edema  Skin: Negative for: rash, blister, infection or ulcers.  Endocrine: Negative for: polyuria, polydipsia. No osteoporosis. No  thyroid disease.     MEDICATIONS:     Current Outpatient Medications:     Potassium Chloride ER 20 MEQ Oral Tab CR, Take 40 mEq by mouth every morning., Disp: 60 tablet, Rfl: 1    loratadine 10 MG Oral Tab, TAKE 1 TABLET BY MOUTH EVERY DAY, Disp: 30 tablet, Rfl: 0    cyclobenzaprine 10 MG Oral Tab, Take 1 tablet (10 mg total) by mouth nightly as needed., Disp: 90 tablet, Rfl: 1    predniSONE 5 MG Oral Tab, Take 1 tablet (5 mg total) by mouth daily., Disp: 90 tablet, Rfl: 0    hydroxychloroquine 200 MG Oral Tab, Take 1 tablet (200 mg total) by mouth daily., Disp: 90 tablet, Rfl: 0    BD PEN NEEDLE KADEN 2ND GEN 32G X 4 MM Does not apply Misc, 1 EACH DAILY. USE DAILY WITH INSULIN, Disp: 100 each, Rfl: 1    insulin glargine (LANTUS SOLOSTAR) 100 UNIT/ML Subcutaneous Solution Pen-injector, Inject 10 Units into the skin daily with dinner., Disp: 3 mL, Rfl: 1    glipiZIDE 5 MG Oral Tab, Take 1 tablet (5 mg total) by mouth every morning before breakfast., Disp: 30 tablet, Rfl: 1    famotidine 40 MG Oral Tab, Take 1 tablet (40 mg total) by mouth 2 (two) times daily., Disp: , Rfl:     montelukast 10 MG Oral Tab, Take 1 tablet (10 mg total) by mouth nightly., Disp: 90 tablet, Rfl: 3    torsemide 20 MG Oral Tab, Take 1 tablet (20 mg total) by mouth daily., Disp: , Rfl:     valsartan 80 MG Oral Tab, Take 1 tablet (80 mg total) by mouth daily., Disp: 90 tablet, Rfl: 3    tamsulosin 0.4 MG Oral Cap, Take 1 capsule (0.4 mg total) by mouth daily., Disp: 90 capsule, Rfl: 1    carvedilol 12.5 MG Oral Tab, Take 1 tablet (12.5 mg total) by mouth 2 (two) times daily with meals., Disp: 180 tablet, Rfl: 3    fluticasone-salmeterol (WIXELA INHUB) 250-50 MCG/ACT Inhalation Aerosol Powder, Breath Activated, Inhale 1 puff into the lungs every 12 (twelve) hours., Disp: 1 each, Rfl: 5    Lancets Does not apply Misc, Dx. E11.9. Checks qam., Disp: 50 each, Rfl: 6    Blood Glucose Monitoring Suppl (BLOOD GLUCOSE SYSTEM GILLES) Does not apply Kit, Dx.  E11.9. Checks qam., Disp: 1 kit, Rfl: 0    Blood Glucose Monitoring Suppl w/Device Does not apply Kit, Dx. E11.9. Checks qam., Disp: 1 kit, Rfl: 0    Blood Gluc Meter Disp-Strips Does not apply Device, Dx. E11.9. Checks qam., Disp: 100 each, Rfl: 0    gabapentin 100 MG Oral Cap, Take 1 capsule (100 mg total) by mouth nightly., Disp: 90 capsule, Rfl: 3    midodrine 2.5 MG Oral Tab, Take 1 tablet (2.5 mg total) by mouth in the morning and 1 tablet (2.5 mg total) at noon and 1 tablet (2.5 mg total) in the evening. Hold if BP > 120/80.., Disp: 90 tablet, Rfl: 0    Accu-Chek FastClix Lancets Does not apply Misc, Check sugar once daily as directed (E11.42), Disp: 100 each, Rfl: 2    Glucose Blood (ACCU-CHEK GUIDE) In Vitro Strip, Check blood sugar twice a day (fasting and before dinner). DX: E11.42, NIDDM, Disp: 200 strip, Rfl: 1    rosuvastatin (CRESTOR) 20 MG Oral Tab, Take 1 tablet (20 mg total) by mouth nightly., Disp: 90 tablet, Rfl: 0    glycopyrrolate 1 MG Oral Tab, Take 1 tablet (1 mg total) by mouth 3 (three) times daily., Disp: 90 tablet, Rfl: 1    albuterol 108 (90 Base) MCG/ACT Inhalation Aero Soln, inhale 2 puff by inhalation route  every 4 - 6 hours as needed, Disp: 1 each, Rfl: 0    albuterol 108 (90 Base) MCG/ACT Inhalation Aero Soln, inhale 2 puff by inhalation route  every 4 - 6 hours as needed, Disp: 1 each, Rfl: 5    Blood Glucose Monitoring Suppl (ONETOUCH VERIO) w/Device Does not apply Kit, 1 each daily. Test 1x daily, Disp: 1 kit, Rfl: 0    Glucose Blood (ONETOUCH VERIO) In Vitro Strip, Test 1x daily, Disp: 100 strip, Rfl: 1    OneTouch Delica Lancets 33G Does not apply Misc, 4 x daily, Disp: 100 each, Rfl: 1    Blood Glucose Monitoring Suppl (BLOOD GLUCOSE SYSTEM GILLES) Does not apply Kit, DX E 11.9.  Checks every morning., Disp: 1 kit, Rfl: 0    Lancets Does not apply Misc, DX E 11.9.  Checks every morning., Disp: 50 each, Rfl: 11    Blood Glucose Monitoring Suppl w/Device Does not apply Kit, DX E  11.9.  Checks every morning., Disp: 1 kit, Rfl: 0    acetaminophen 500 MG Oral Tab, Take 1 tablet (500 mg total) by mouth daily., Disp: , Rfl:     ALLERGIES:   Allergies   Allergen Reactions    Celebrex [Celecoxib] PALPITATIONS    Penicillins ITCHING and SWELLING    Metformin And Related UNKNOWN    Olmesartan UNKNOWN       SOCIAL HISTORY:   Social History     Socioeconomic History    Marital status:    Tobacco Use    Smoking status: Former     Current packs/day: 0.00     Types: Cigarettes     Quit date:      Years since quittin.7     Passive exposure: Never    Smokeless tobacco: Never    Tobacco comments:     2013-1016   Vaping Use    Vaping status: Never Used   Substance and Sexual Activity    Alcohol use: Never    Drug use: Never     PAST MEDICAL HISTORY:   Past Medical History:    Anxiety state    Cancer (HCC)    breast cancer 2018    Chronic obstructive pulmonary disease, unspecified (HCC)    Chronic obstructive pulmonary disease, unspecified (HCC)    COPD (chronic obstructive pulmonary disease) (HCC)    Diabetes (HCC)    Diabetes mellitus (HCC)    Essential hypertension    Exposure to medical diagnostic radiation    High blood pressure    High cholesterol    History of blood transfusion    low hemoglobin    Osteoarthritis    PONV (postoperative nausea and vomiting)    Pulmonary embolism (HCC)    Pulmonary emphysema (HCC)    Rheumatoid arthritis (HCC)       PAST SURGICAL HISTORY:   Past Surgical History:   Procedure Laterality Date    Cataract extraction w/  intraocular lens implant Bilateral 2017    Dr in [facility]     Colonoscopy      Colonoscopy N/A 2022    Procedure: COLONOSCOPY;  Surgeon: Mikey Garcia MD;  Location: [facility] ENDOSCOPY    Colonoscopy N/A 06/15/2023    Procedure: COLONOSCOPY;  Surgeon: Mikey Garcia MD;  Location: [facility] ENDOSCOPY    Correct bunion,simple      right foot      Egd  2023    Dr. Garcia; Duodenal AVM's x4 cauterized    Hysterectomy      , no  abnormal Paps, due to heavy bleeding with fibroids.  Not sure if it had bilateral salpingo-oophorectomy.    Ir aneurysm repairm  2010    Computer assisted volumetric craniofomy with clipping of anterior cerebral artery.    Lumpectomy right      Radiation right      Rotator cuff repair      1993    Total knee replacement Right 2010    Total knee replacement Left 07/02/2015    Transoral incisionless fundoplication - internal  01/25/2012 Fredy fundoplication at [facility] Dr. Fournier.    Fredy fundoplication at [facility] Dr. Fournier.    Trigger finger release      left hand  2005    Yag capsulotomy - ou - both eyes Bilateral 09/2021    done in [place]       PHYSICAL EXAM:   Vitals:    10/08/24 1346 10/08/24 1355 10/08/24 1425   BP: (!) 192/92 (!) 168/76 148/78   Pulse: 98     Weight: 146 lb (66.2 kg)     Height: 5' 1\" (1.549 m)       BMI:   Body mass index is 27.59 kg/m².    General Appearance:  alert, well developed, in no acute distress  Nutritional:  no extreme weight gain or loss  Head: Atraumatic  Eyes:  normal conjunctivae, sclera., normal sclera and normal pupils  Throat/Neck: normal sound to voice. Normal hearing, normal speech  Back: no kyphosis  Respiratory:  Speaking in full sentences, non-labored. no increased work of breathing, no audible wheezing    Skin:  normal moisture and skin texture, no visible lesions  Hair and nails: normal scalp hair  Hematologic:  no excessive bruising  Neuro: motor grossly intact, moving all extremities without difficulty  Psychiatric:  oriented to time, self, and place  Extremities: no obvious extremity swelling, no lesions    LABS: Pertinent labs reviewed    ASSESSMENT/PLAN:    -Reviewed with patient the pathogenesis of diabetes, clinical significance of A1c, and common complications such as: microvascular, macrovascular and diabetic ketoacidosis. Patient verbalizes understanding of the importance of glycemic control and the goals of therapy.   Per  ADA 2024 guidelines, it is recommended that older adults (age 65 and above) who are healthy with few coexisting chronic illnesses, intact cognitive and functional status, should have A1C goal of <7.5%, fasting or preprandial glucose of  and bedtime glucose of .     1.Type 2 Diabetes Mellitus, uncontrolled   -LAB DATA  HbA,C: 8.3% today   a) Medications  - continue with Glipizide 5mg daily with breakfast   - decrease Lantus 16--> 13 units nightly - Risks and benefits reviewed. Verbalizes understanding.    - congratulated patient on improved glucose patterns   - discussed to continue to follow a low carb diet   - discussed to continue to stay active as currently doing   - reviewed rule of 15s in treating hypoglycemia   -reviewed target goal BG readings and A1C  -reviewed when to call and notify me of abnormal BG readings.     b) No nephropathy: GFR: 71 on 10/4/2024 and urine MA: 120.7 on 3/10/2023 -->reminded to repeat urine MA  c) UTD with optho - last apt with Dr. Melo was 2024  D)foot exam: abnormal on 2/15/2024  e) cont using freestyle van 3 cgm   f) Life style changes reviewed    2.dyslipidemia   -LDL: 57 and Tri on 2024  -Atorvastatin 80mg at bedtime  - repeat lipid panel in 2025      RTC in 3 months    Patient instructed to call sooner if they develop Blood glucose readings <75 and/or if they have readings persistently >200.     The risks and benefits of my recommendations were discussed with the patient today. questions were also answered to the best of my knowledge. Patient verbalizes understanding of these issues and agrees to the plan.    10/8/2024  BILL Swain

## 2024-10-09 ENCOUNTER — OFFICE VISIT (OUTPATIENT)
Dept: HEMATOLOGY/ONCOLOGY | Facility: HOSPITAL | Age: 84
End: 2024-10-09
Attending: INTERNAL MEDICINE
Payer: MEDICARE

## 2024-10-09 VITALS
TEMPERATURE: 98 F | OXYGEN SATURATION: 95 % | HEART RATE: 85 BPM | SYSTOLIC BLOOD PRESSURE: 183 MMHG | RESPIRATION RATE: 16 BRPM | DIASTOLIC BLOOD PRESSURE: 75 MMHG

## 2024-10-09 DIAGNOSIS — Q27.30 AVM (ARTERIOVENOUS MALFORMATION) (HCC): Primary | ICD-10-CM

## 2024-10-09 DIAGNOSIS — K90.9 MALABSORPTION OF IRON (HCC): ICD-10-CM

## 2024-10-09 DIAGNOSIS — D50.0 IRON DEFICIENCY ANEMIA DUE TO CHRONIC BLOOD LOSS: ICD-10-CM

## 2024-10-09 PROCEDURE — 96374 THER/PROPH/DIAG INJ IV PUSH: CPT

## 2024-10-09 RX ORDER — HEPARIN SODIUM (PORCINE) LOCK FLUSH IV SOLN 100 UNIT/ML 100 UNIT/ML
5 SOLUTION INTRAVENOUS ONCE
OUTPATIENT
Start: 2024-10-10

## 2024-10-09 RX ORDER — SODIUM CHLORIDE 9 MG/ML
10 INJECTION, SOLUTION INTRAMUSCULAR; INTRAVENOUS; SUBCUTANEOUS ONCE
OUTPATIENT
Start: 2024-10-10

## 2024-10-09 NOTE — PROGRESS NOTES
Pt here for VENOFER 200MG IV X 3 DOSES . Pt denies any issues or concerns.   Last venofer given in April 2024 and tolerated well     Ordering Provider: DR WATSON  Order Exp: 3 DOSES ORDERED, 2 REMAIN AFTER TODAY    Port accessed  Venofer 200mg IV given slowly over 10 minutes via sidearm of free flowing NS, easy blood return throughout administration  Flushed with 20ml NS post administration  Port needle de-accessed, 2x2 gauze/ coban wrap to site       Pt tolerated infusion without difficulty or complaint. Reviewed next apt date/time: YES - 10/17 AT 3PM - pt & daughter requested Dr Sorto's BMP order be drawn that day.  Pt started potassium pill for low level the other day.      Education Record  Learner:  Patient and Family Member  Disease / Diagnosis: iron def anemia  Barriers / Limitations:  None  Method:  Discussion  General Topics:  Medication, Procedure, Side effects and symptom management, and Plan of care reviewed  Outcome:  Shows understanding

## 2024-10-14 LAB
GLUCOSE BLOOD: 257
HEMOGLOBIN A1C: 10.3 % (ref 4.3–5.6)
TEST STRIP LOT #: NORMAL NUMERIC

## 2024-10-15 ENCOUNTER — TELEPHONE (OUTPATIENT)
Dept: PULMONOLOGY | Facility: CLINIC | Age: 84
End: 2024-10-15

## 2024-10-15 NOTE — TELEPHONE ENCOUNTER
Spoke with patient's daughter, Brea, states she takes patient to appointments, has covid, asking to re-schedule today's appointment, follow up scheduled with Dr. Parker on 10/31/24 at 1:30pm, verified date and time, Brea verbalized understanding.

## 2024-10-15 NOTE — TELEPHONE ENCOUNTER
Patient daughter called to reschedule today's appointment.  Daughter tested positive for Covid on 10/13/24 and patient was exposed.  First available appointment was scheduled for 3/25/24 and placed on wait list but she feels patient needs to be seen sooner.   Please call

## 2024-10-17 ENCOUNTER — APPOINTMENT (OUTPATIENT)
Dept: CT IMAGING | Facility: HOSPITAL | Age: 84
End: 2024-10-17
Attending: EMERGENCY MEDICINE
Payer: MEDICARE

## 2024-10-17 ENCOUNTER — APPOINTMENT (OUTPATIENT)
Dept: GENERAL RADIOLOGY | Facility: HOSPITAL | Age: 84
End: 2024-10-17
Payer: MEDICARE

## 2024-10-17 ENCOUNTER — APPOINTMENT (OUTPATIENT)
Dept: HEMATOLOGY/ONCOLOGY | Facility: HOSPITAL | Age: 84
End: 2024-10-17
Attending: INTERNAL MEDICINE
Payer: MEDICARE

## 2024-10-17 ENCOUNTER — HOSPITAL ENCOUNTER (INPATIENT)
Facility: HOSPITAL | Age: 84
LOS: 2 days | Discharge: HOME HEALTH | End: 2024-10-19
Attending: EMERGENCY MEDICINE | Admitting: HOSPITALIST
Payer: MEDICARE

## 2024-10-17 DIAGNOSIS — J96.01 ACUTE RESPIRATORY FAILURE WITH HYPOXIA (HCC): Primary | ICD-10-CM

## 2024-10-17 DIAGNOSIS — J18.9 PNEUMONIA OF BOTH LUNGS DUE TO INFECTIOUS ORGANISM, UNSPECIFIED PART OF LUNG: ICD-10-CM

## 2024-10-17 LAB
ALBUMIN SERPL-MCNC: 3.4 G/DL (ref 3.2–4.8)
ALP LIVER SERPL-CCNC: 103 U/L
ALT SERPL-CCNC: 10 U/L
ANION GAP SERPL CALC-SCNC: 6 MMOL/L (ref 0–18)
AST SERPL-CCNC: 21 U/L (ref ?–34)
ATRIAL RATE: 101 BPM
BASOPHILS # BLD AUTO: 0.04 X10(3) UL (ref 0–0.2)
BASOPHILS NFR BLD AUTO: 0.3 %
BILIRUB DIRECT SERPL-MCNC: <0.1 MG/DL (ref ?–0.3)
BILIRUB SERPL-MCNC: 0.2 MG/DL (ref 0.2–1.1)
BUN BLD-MCNC: 18 MG/DL (ref 9–23)
BUN/CREAT SERPL: 20 (ref 10–20)
CALCIUM BLD-MCNC: 8.6 MG/DL (ref 8.7–10.4)
CHLORIDE SERPL-SCNC: 106 MMOL/L (ref 98–112)
CO2 SERPL-SCNC: 29 MMOL/L (ref 21–32)
CREAT BLD-MCNC: 0.9 MG/DL
DEPRECATED RDW RBC AUTO: 54.3 FL (ref 35.1–46.3)
EGFRCR SERPLBLD CKD-EPI 2021: 63 ML/MIN/1.73M2 (ref 60–?)
EOSINOPHIL # BLD AUTO: 0.23 X10(3) UL (ref 0–0.7)
EOSINOPHIL NFR BLD AUTO: 1.9 %
ERYTHROCYTE [DISTWIDTH] IN BLOOD BY AUTOMATED COUNT: 17.9 % (ref 11–15)
GLUCOSE BLD-MCNC: 162 MG/DL (ref 70–99)
GLUCOSE BLDC GLUCOMTR-MCNC: 179 MG/DL (ref 70–99)
HCT VFR BLD AUTO: 30.2 %
HGB BLD-MCNC: 9.2 G/DL
IMM GRANULOCYTES # BLD AUTO: 0.12 X10(3) UL (ref 0–1)
IMM GRANULOCYTES NFR BLD: 1 %
LYMPHOCYTES # BLD AUTO: 1.04 X10(3) UL (ref 1–4)
LYMPHOCYTES NFR BLD AUTO: 8.6 %
MCH RBC QN AUTO: 26.4 PG (ref 26–34)
MCHC RBC AUTO-ENTMCNC: 30.5 G/DL (ref 31–37)
MCV RBC AUTO: 86.5 FL
MONOCYTES # BLD AUTO: 0.85 X10(3) UL (ref 0.1–1)
MONOCYTES NFR BLD AUTO: 7 %
NEUTROPHILS # BLD AUTO: 9.87 X10 (3) UL (ref 1.5–7.7)
NEUTROPHILS # BLD AUTO: 9.87 X10(3) UL (ref 1.5–7.7)
NEUTROPHILS NFR BLD AUTO: 81.2 %
NT-PROBNP SERPL-MCNC: 414 PG/ML (ref ?–450)
OSMOLALITY SERPL CALC.SUM OF ELEC: 297 MOSM/KG (ref 275–295)
P AXIS: 57 DEGREES
P-R INTERVAL: 130 MS
PLATELET # BLD AUTO: 315 10(3)UL (ref 150–450)
POTASSIUM SERPL-SCNC: 3.4 MMOL/L (ref 3.5–5.1)
PROT SERPL-MCNC: 7.2 G/DL (ref 5.7–8.2)
Q-T INTERVAL: 352 MS
QRS DURATION: 64 MS
QTC CALCULATION (BEZET): 456 MS
R AXIS: -7 DEGREES
RBC # BLD AUTO: 3.49 X10(6)UL
SODIUM SERPL-SCNC: 141 MMOL/L (ref 136–145)
T AXIS: 46 DEGREES
TROPONIN I SERPL HS-MCNC: 13 NG/L
VENTRICULAR RATE: 101 BPM
WBC # BLD AUTO: 12.2 X10(3) UL (ref 4–11)

## 2024-10-17 PROCEDURE — 99223 1ST HOSP IP/OBS HIGH 75: CPT | Performed by: HOSPITALIST

## 2024-10-17 PROCEDURE — 71045 X-RAY EXAM CHEST 1 VIEW: CPT | Performed by: EMERGENCY MEDICINE

## 2024-10-17 PROCEDURE — 71260 CT THORAX DX C+: CPT | Performed by: EMERGENCY MEDICINE

## 2024-10-17 RX ORDER — METOCLOPRAMIDE HYDROCHLORIDE 5 MG/ML
5 INJECTION INTRAMUSCULAR; INTRAVENOUS EVERY 8 HOURS PRN
Status: DISCONTINUED | OUTPATIENT
Start: 2024-10-17 | End: 2024-10-18

## 2024-10-17 RX ORDER — CARVEDILOL 12.5 MG/1
12.5 TABLET ORAL 2 TIMES DAILY WITH MEALS
Status: DISCONTINUED | OUTPATIENT
Start: 2024-10-17 | End: 2024-10-17

## 2024-10-17 RX ORDER — DEXTROSE MONOHYDRATE 25 G/50ML
50 INJECTION, SOLUTION INTRAVENOUS
Status: DISCONTINUED | OUTPATIENT
Start: 2024-10-17 | End: 2024-10-19

## 2024-10-17 RX ORDER — ENOXAPARIN SODIUM 100 MG/ML
40 INJECTION SUBCUTANEOUS DAILY
Status: DISCONTINUED | OUTPATIENT
Start: 2024-10-18 | End: 2024-10-19

## 2024-10-17 RX ORDER — BENZONATATE 200 MG/1
200 CAPSULE ORAL 3 TIMES DAILY PRN
Status: DISCONTINUED | OUTPATIENT
Start: 2024-10-17 | End: 2024-10-19

## 2024-10-17 RX ORDER — AZITHROMYCIN 250 MG/1
500 TABLET, FILM COATED ORAL DAILY
Status: DISCONTINUED | OUTPATIENT
Start: 2024-10-18 | End: 2024-10-19

## 2024-10-17 RX ORDER — ONDANSETRON 2 MG/ML
4 INJECTION INTRAMUSCULAR; INTRAVENOUS EVERY 6 HOURS PRN
Status: DISCONTINUED | OUTPATIENT
Start: 2024-10-17 | End: 2024-10-19

## 2024-10-17 RX ORDER — IPRATROPIUM BROMIDE AND ALBUTEROL SULFATE 2.5; .5 MG/3ML; MG/3ML
3 SOLUTION RESPIRATORY (INHALATION) EVERY 6 HOURS PRN
Status: DISCONTINUED | OUTPATIENT
Start: 2024-10-17 | End: 2024-10-19

## 2024-10-17 RX ORDER — METHYLPREDNISOLONE SODIUM SUCCINATE 40 MG/ML
40 INJECTION, POWDER, LYOPHILIZED, FOR SOLUTION INTRAMUSCULAR; INTRAVENOUS EVERY 8 HOURS
Status: DISCONTINUED | OUTPATIENT
Start: 2024-10-17 | End: 2024-10-19

## 2024-10-17 RX ORDER — POTASSIUM CHLORIDE 1500 MG/1
40 TABLET, EXTENDED RELEASE ORAL ONCE
Status: COMPLETED | OUTPATIENT
Start: 2024-10-17 | End: 2024-10-17

## 2024-10-17 RX ORDER — NICOTINE POLACRILEX 4 MG
30 LOZENGE BUCCAL
Status: DISCONTINUED | OUTPATIENT
Start: 2024-10-17 | End: 2024-10-19

## 2024-10-17 RX ORDER — ACETAMINOPHEN 500 MG
500 TABLET ORAL EVERY 4 HOURS PRN
Status: DISCONTINUED | OUTPATIENT
Start: 2024-10-17 | End: 2024-10-18

## 2024-10-17 RX ORDER — LABETALOL HYDROCHLORIDE 5 MG/ML
10 INJECTION, SOLUTION INTRAVENOUS EVERY 4 HOURS PRN
Status: DISCONTINUED | OUTPATIENT
Start: 2024-10-17 | End: 2024-10-19

## 2024-10-17 RX ORDER — CARVEDILOL 12.5 MG/1
12.5 TABLET ORAL 2 TIMES DAILY WITH MEALS
Status: DISCONTINUED | OUTPATIENT
Start: 2024-10-17 | End: 2024-10-19

## 2024-10-17 RX ORDER — AZITHROMYCIN 250 MG/1
500 TABLET, FILM COATED ORAL ONCE
Status: COMPLETED | OUTPATIENT
Start: 2024-10-17 | End: 2024-10-17

## 2024-10-17 RX ORDER — TEMAZEPAM 15 MG/1
15 CAPSULE ORAL NIGHTLY PRN
Status: DISCONTINUED | OUTPATIENT
Start: 2024-10-17 | End: 2024-10-19

## 2024-10-17 RX ORDER — NICOTINE POLACRILEX 4 MG
15 LOZENGE BUCCAL
Status: DISCONTINUED | OUTPATIENT
Start: 2024-10-17 | End: 2024-10-19

## 2024-10-17 NOTE — ED INITIAL ASSESSMENT (HPI)
Patient presents to ER for concerns of low O2 per, daughter, patients O2 sats have been fluctuating and dropping into the 80s.   Patient has had a cough x 3 weeks, patient does have COPD.   O2 sats 88-92%  in triage.

## 2024-10-17 NOTE — ED PROVIDER NOTES
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  Patient reports 3 weeks of cough, fatigue, noticed this morning her oxygen was low.  Denies leg swelling.  Denies melena.    Objective:     Past Medical History:    Anxiety state    Cancer (HCC)    breast cancer 2018    Chronic obstructive pulmonary disease, unspecified (HCC)    Chronic obstructive pulmonary disease, unspecified (HCC)    COPD (chronic obstructive pulmonary disease) (HCC)    Diabetes (HCC)    Diabetes mellitus (HCC)    Essential hypertension    Exposure to medical diagnostic radiation    High blood pressure    High cholesterol    History of blood transfusion    low hemoglobin    Osteoarthritis    PONV (postoperative nausea and vomiting)    Pulmonary embolism (HCC)    Pulmonary emphysema (HCC)    Rheumatoid arthritis (HCC)              Past Surgical History:   Procedure Laterality Date    Cataract extraction w/  intraocular lens implant Bilateral 2017    Dr in [facility]     Colonoscopy      Colonoscopy N/A 07/21/2022    Procedure: COLONOSCOPY;  Surgeon: Mikey Garcia MD;  Location: [facility] ENDOSCOPY    Colonoscopy N/A 06/15/2023    Procedure: COLONOSCOPY;  Surgeon: Mikey Garcia MD;  Location: [facility] ENDOSCOPY    Correct bunion,simple      right foot  1993    Egd  12/21/2023    Dr. Garcia; Duodenal AVM's x4 cauterized    Hysterectomy      1972, no abnormal Paps, due to heavy bleeding with fibroids.  Not sure if it had bilateral salpingo-oophorectomy.    Ir aneurysm repairm  2010    Computer assisted volumetric craniofomy with clipping of anterior cerebral artery.    Lumpectomy right      Radiation  right      Rotator cuff repair          Total knee replacement Right     Total knee replacement Left 2015    Transoral incisionless fundoplication - internal  2012 Fredy fundoplication at [facility] Dr. Fournier.    Fredy fundoplication at [facility] Dr. Fournier.    Trigger finger release      left hand      Yag capsulotomy - ou - both eyes Bilateral 2021    done in [place]                Social History     Socioeconomic History    Marital status:    Tobacco Use    Smoking status: Former     Current packs/day: 0.00     Types: Cigarettes     Quit date:      Years since quittin.8     Passive exposure: Never    Smokeless tobacco: Never    Tobacco comments:     [id number]   Vaping Use    Vaping status: Never Used   Substance and Sexual Activity    Alcohol use: Never    Drug use: Never   Social History Narrative    Just moved in from [place].   passed away and that is why moved from [place] to be with daughter who lives in [place].  Currently lives with daughter.     Social Drivers of Health     Financial Resource Strain: Low Risk  (2024)    Financial Resource Strain     Difficulty of Paying Living Expenses: Not hard at all     Med Affordability: No   Food Insecurity: No Food Insecurity (2024)    Food Insecurity     Food Insecurity: Never true   Transportation Needs: No Transportation Needs (2024)    Transportation Needs     Lack of Transportation: No   Housing Stability: Low Risk  (2024)    Housing Stability     Housing Instability: No                  Physical Exam     ED Triage Vitals   BP 10/17/24 1402 (!) 200/98   Pulse 10/17/24 1400 108   Resp 10/17/24 1400 22   Temp 10/17/24 1400 96.9 °F (36.1 °C)   Temp src --    SpO2 10/17/24 1400 92 %   O2 Device 10/17/24 1400 None (Room air)       Current Vitals:   Vital Signs  BP: (!) 154/99  Pulse: 110  Resp: 20  Temp: 96.9 °F (36.1 °C)  MAP (mmHg): (!) 114    Oxygen  Therapy  SpO2: 92 %  O2 Device: Nasal cannula  O2 Flow Rate (L/min): 2 L/min        Physical Exam  Vital signs reviewed. Nursing note reviewed.  Constitutional: Well-developed. Well-nourished. In no acute distress  HENT: Mucous membranes moist.   EYES: No scleral icterus or conjunctival injection.  NECK: Full ROM. Supple.   CARDIAC: Normal rate. Normal S1/ S2. 2+ distal pulses. No edema  PULM/CHEST: Clear to auscultation bilaterally. No wheezes  ABD: Soft, non-tender, non-distended.   RECTAL: deferred  Extremities: No obvious deformity  NEURO: Awake, alert, following commands, moving extremities, answering questions.   SKIN: Warm and dry. No rash or lesions.  PSYCH: Normal judgment. Normal affect.        ED Course     Labs Reviewed   CBC WITH DIFFERENTIAL WITH PLATELET - Abnormal; Notable for the following components:       Result Value    WBC 12.2 (*)     RBC 3.49 (*)     HGB 9.2 (*)     HCT 30.2 (*)     MCHC 30.5 (*)     RDW-SD 54.3 (*)     RDW 17.9 (*)     Neutrophil Absolute Prelim 9.87 (*)     Neutrophil Absolute 9.87 (*)     All other components within normal limits   BASIC METABOLIC PANEL (8) - Abnormal; Notable for the following components:    Glucose 162 (*)     Potassium 3.4 (*)     Calcium, Total 8.6 (*)     Calculated Osmolality 297 (*)     All other components within normal limits   HEPATIC FUNCTION PANEL (7) - Normal   TROPONIN I HIGH SENSITIVITY - Normal   PRO BETA NATRIURETIC PEPTIDE - Normal     EKG    Rate, intervals and axes as noted on EKG Report.  Rate: 101  Rhythm: sinus tachycardia  Reading: No ectopy, no ST or T wave changes                       MDM      Assessment:Patient is a 84 year old female presenting to the ED due to dyspnea, cough.    Comorbidities/chronic illnesses impacting care: COPD, GI bleed, Prior PE    History obtained from: patient    Laboratory results above were independently viewed and interpreted by me.  External records and previous hospitalization records reviewed and  documented below      Radiography/Imaging:    CT CHEST PE AORTA (IV ONLY) (CPT=71260)   Final Result   PROCEDURE: CT CHEST PE AORTA (IV ONLY) (CPT=71260)       COMPARISON: [facility], XR CHEST AP PORTABLE (CPT=71045),    10/17/2024, 2:36 PM.  [facility], CT CHEST(CONTRAST ONLY)    (CPT=71260), 4/26/2024, 11:31 AM.       INDICATIONS: Cough x3 weeks. Hx of COPD.       TECHNIQUE: CT images of the chest were obtained with non-ionic intravenous    contrast material.  Automated exposure control for dose reduction was    used. Adjustment of the mA and/or kV was done based on the patient's size.    Use of iterative reconstruction    technique for dose reduction was used. Dose information is transmitted to    the ACR (American College of Radiology) NRDR (National Radiology Data    Registry) which includes the Dose Index Registry.       FINDINGS:    CARDIAC: Mild coronary artery calcification.  Mild mitral annular    calcification.   MEDIASTINUM/DYLON: Mild lower right paratracheal lymph node enlargement-10    cm short axis and lower left paratracheal lymph node enlargement -11 mm.    PULMONARY ARTERIES: Stable enlargement of main pulmonary artery-3.2 cm.     No pulmonary embolus through proximal subsegmental level.     AORTA: Irregular atherosclerotic plaque in the aorta.  Aortic root    measures 4 cm. No aneurysm or dissection.   LUNGS/PLEURA: Emphysema with coexistent fibrotic changes in the periphery    of the mid and upper lungs.  Some new patchy ground-glass attenuation    opacities including a 2.6 cm region in the posterior right upper lobe.     More dense confluent opacity anterior    right upper lobe and right middle lobe associated with multiple peripheral    gas-filled cyst-like foci and slightly more confluent lingular opacities.     A chronic elongated nodular bandlike region of scarring in the right    middle lobe.   CHEST WALL: Right Port-A-Cath is looped back on itself in the  SVC.     Postsurgical changes in the right breast.   LIMITED ABDOMEN: Small hiatal hernia.  A 3.9 cm simple left renal cyst.     No suspect abnormality.   BONES: Normal.  No suspect bone lesion or fracture.       OTHER: Negative.                         =====   CONCLUSION:    1. No pulmonary embolus.   2. Emphysema with fibrotic changes in periphery of mid and upper lungs.     More dense confluent opacities in the right upper lobe and right middle    lobe and to a lesser extent lingula may be related to acute pneumonia or    more pronounced fibrosis.  2.6 cm    ground-glass opacity in the posterior right upper lobe may represent    pneumonia or a new area of fibrosis.   3. Mild nonspecific paratracheal lymph node enlargement.   4. Tip of Port-A-Cath is looped back on itself in the SVC.                 Dictated by (CST): Michael Gonzalez MD on 10/17/2024 at 5:58 PM        Finalized by (CST): Michael Gonzalez MD on 10/17/2024 at 6:12 PM               XR CHEST AP PORTABLE  (CPT=71045)   Final Result   PROCEDURE: XR CHEST AP PORTABLE  (CPT=71045)   TIME: 1436       COMPARISON: [facility], XR CHEST PA + LAT CHEST    (CPT=71046), 3/06/2024, 3:59 PM.  [facility], CT    CHEST(CONTRAST ONLY) (CPT=71260), 4/26/2024, 11:31 AM.  [facility], XR CHEST AP PORTABLE (CPT=71045),    2/23/2024, 1:31 PM.       INDICATIONS: Generalized weakness and hypoxia. History of COPD.       TECHNIQUE:   Single view.         Impression:       Stable heart size.  The tip of the port a catheter remains malpositioned    in the azygos vein.  Normal vascularity       Stable low lung volumes.  Mild ill-defined opacities throughout the    bilateral perihilar and mid lungs have increased from March 6, 2024       No pneumothorax or large effusion   Finalized by (CST): Alec Davis MD on 10/17/2024 at 3:07 PM                          Imaging result above were independently viewed and interpreted by  me.    Consideration of Social Determinants of Health and Impact on Medical Decision Making:  Housing/Transportation/Financial Strain/Access to healthcare/Food insecurity/family or Community support/Language and Literacy/Substance abuse/Mental health concerns/Disabilities     -none    ED course  Patient arrives here mildly tachycardic, low normal oxygen, hypertensive.  She is quite complicated with a history of PE, intolerant of anticoagulation due to GI bleed, is on monthly octreotide injections, iron infusions.  Her IVC filter has since been removed.  With her cough and fatigue, concern for pneumonia, PE.  She denies any melena.  Will check labs, CT chest.  Her breath sounds are clear, will hold nebs or steroids at this point.    Progress note: Labs reviewed, has mild leukocytosis.  Otherwise unrevealing.  CT reviewed, has signs of bilateral infiltrates, worsening pulmonary fibrosis.  With her hypoxia here, and persistent tachycardia on reexamination, will plan to give antibiotics for community-acquired pneumonia, consult her pulmonologist and admit.    Time spent providing Critical Care Services to the patient (excluding time spent by the resident/mid-level provider and time spent performing separately billable procedures): 45 minutes.  Most of the time was spent at the bedside of the patient, reevaluating the patient and vital signs, explaining conditions and results to the patient and/or family, as well as speaking to the PMD and/or Consultant(s).              Medications   cefTRIAXone (Rocephin) 2 g in sodium chloride 0.9% 100 mL IVPB-ADDV (has no administration in time range)   azithromycin (Zithromax) tab 500 mg (has no administration in time range)   iopamidol 76% (ISOVUE-370) injection for power injector (70 mL Intravenous Given 10/17/24 9117)                 Medical Decision Making      Disposition and Plan     Clinical Impression:  1. Acute respiratory failure with hypoxia (HCC)    2. Pneumonia of both  lungs due to infectious organism, unspecified part of lung         Disposition:  There is no disposition on file for this visit.  There is no disposition time on file for this visit.    Follow-up:  No follow-up provider specified.  We recommend that you schedule follow up care with a primary care provider within the next three months to obtain basic health screening including reassessment of your blood pressure.      Medications Prescribed:  Current Discharge Medication List              Supplementary Documentation:

## 2024-10-17 NOTE — ED QUICK NOTES
Orders for admission, patient is aware of plan and ready to go upstairs. Any questions, please call ED RN Aida at extension [phone].     Patient Covid vaccination status: Fully vaccinated     COVID Test Ordered in ED: None    COVID Suspicion at Admission: N/A    Running Infusions:  None    Mental Status/LOC at time of transport: x4    Other pertinent information:   CIWA score: N/A   NIH score:  N/A

## 2024-10-17 NOTE — ED QUICK NOTES
Dr. Carvajal notified of unsuccessful port blood return. Xray shows port is malaligned. Discussed with pt for US PIV, Marco Antonio SOW at bedside for US PIV.

## 2024-10-18 LAB
ANION GAP SERPL CALC-SCNC: 8 MMOL/L (ref 0–18)
BASOPHILS # BLD AUTO: 0.02 X10(3) UL (ref 0–0.2)
BASOPHILS NFR BLD AUTO: 0.2 %
BUN BLD-MCNC: 15 MG/DL (ref 9–23)
BUN/CREAT SERPL: 16.3 (ref 10–20)
CALCIUM BLD-MCNC: 8.4 MG/DL (ref 8.7–10.4)
CHLORIDE SERPL-SCNC: 104 MMOL/L (ref 98–112)
CO2 SERPL-SCNC: 21 MMOL/L (ref 21–32)
CREAT BLD-MCNC: 0.92 MG/DL
DEPRECATED RDW RBC AUTO: 54.6 FL (ref 35.1–46.3)
EGFRCR SERPLBLD CKD-EPI 2021: 61 ML/MIN/1.73M2 (ref 60–?)
EOSINOPHIL # BLD AUTO: 0 X10(3) UL (ref 0–0.7)
EOSINOPHIL NFR BLD AUTO: 0 %
ERYTHROCYTE [DISTWIDTH] IN BLOOD BY AUTOMATED COUNT: 18.1 % (ref 11–15)
GLUCOSE BLD-MCNC: 340 MG/DL (ref 70–99)
GLUCOSE BLDC GLUCOMTR-MCNC: 281 MG/DL (ref 70–99)
GLUCOSE BLDC GLUCOMTR-MCNC: 357 MG/DL (ref 70–99)
GLUCOSE BLDC GLUCOMTR-MCNC: 364 MG/DL (ref 70–99)
GLUCOSE BLDC GLUCOMTR-MCNC: 392 MG/DL (ref 70–99)
HCT VFR BLD AUTO: 30.9 %
HGB BLD-MCNC: 9.6 G/DL
IMM GRANULOCYTES # BLD AUTO: 0.1 X10(3) UL (ref 0–1)
IMM GRANULOCYTES NFR BLD: 0.8 %
LYMPHOCYTES # BLD AUTO: 0.45 X10(3) UL (ref 1–4)
LYMPHOCYTES NFR BLD AUTO: 3.7 %
MCH RBC QN AUTO: 26.2 PG (ref 26–34)
MCHC RBC AUTO-ENTMCNC: 31.1 G/DL (ref 31–37)
MCV RBC AUTO: 84.4 FL
MONOCYTES # BLD AUTO: 0.07 X10(3) UL (ref 0.1–1)
MONOCYTES NFR BLD AUTO: 0.6 %
NEUTROPHILS # BLD AUTO: 11.48 X10 (3) UL (ref 1.5–7.7)
NEUTROPHILS # BLD AUTO: 11.48 X10(3) UL (ref 1.5–7.7)
NEUTROPHILS NFR BLD AUTO: 94.7 %
OSMOLALITY SERPL CALC.SUM OF ELEC: 290 MOSM/KG (ref 275–295)
PLATELET # BLD AUTO: 279 10(3)UL (ref 150–450)
POTASSIUM SERPL-SCNC: 4.5 MMOL/L (ref 3.5–5.1)
RBC # BLD AUTO: 3.66 X10(6)UL
SODIUM SERPL-SCNC: 133 MMOL/L (ref 136–145)
WBC # BLD AUTO: 12.1 X10(3) UL (ref 4–11)

## 2024-10-18 PROCEDURE — 99223 1ST HOSP IP/OBS HIGH 75: CPT | Performed by: INTERNAL MEDICINE

## 2024-10-18 PROCEDURE — 99233 SBSQ HOSP IP/OBS HIGH 50: CPT | Performed by: FAMILY MEDICINE

## 2024-10-18 RX ORDER — LOSARTAN POTASSIUM 50 MG/1
50 TABLET ORAL DAILY
Status: DISCONTINUED | OUTPATIENT
Start: 2024-10-18 | End: 2024-10-19

## 2024-10-18 RX ORDER — METOCLOPRAMIDE HYDROCHLORIDE 5 MG/ML
2.5 INJECTION INTRAMUSCULAR; INTRAVENOUS EVERY 8 HOURS PRN
Status: DISCONTINUED | OUTPATIENT
Start: 2024-10-18 | End: 2024-10-19

## 2024-10-18 RX ORDER — IPRATROPIUM BROMIDE AND ALBUTEROL SULFATE 2.5; .5 MG/3ML; MG/3ML
3 SOLUTION RESPIRATORY (INHALATION) EVERY 2 HOUR PRN
Status: DISCONTINUED | OUTPATIENT
Start: 2024-10-18 | End: 2024-10-19

## 2024-10-18 RX ORDER — ROSUVASTATIN CALCIUM 20 MG/1
20 TABLET, COATED ORAL NIGHTLY
Status: DISCONTINUED | OUTPATIENT
Start: 2024-10-18 | End: 2024-10-19

## 2024-10-18 RX ORDER — ACETAMINOPHEN 500 MG
500 TABLET ORAL EVERY 4 HOURS PRN
Status: DISCONTINUED | OUTPATIENT
Start: 2024-10-18 | End: 2024-10-19

## 2024-10-18 RX ORDER — VALSARTAN 80 MG/1
80 TABLET ORAL DAILY
Status: DISCONTINUED | OUTPATIENT
Start: 2024-10-18 | End: 2024-10-18

## 2024-10-18 RX ORDER — IPRATROPIUM BROMIDE AND ALBUTEROL SULFATE 2.5; .5 MG/3ML; MG/3ML
3 SOLUTION RESPIRATORY (INHALATION) EVERY 6 HOURS
Status: DISCONTINUED | OUTPATIENT
Start: 2024-10-18 | End: 2024-10-18

## 2024-10-18 RX ORDER — IPRATROPIUM BROMIDE AND ALBUTEROL SULFATE 2.5; .5 MG/3ML; MG/3ML
3 SOLUTION RESPIRATORY (INHALATION) EVERY 4 HOURS
Status: DISCONTINUED | OUTPATIENT
Start: 2024-10-18 | End: 2024-10-19

## 2024-10-18 RX ORDER — INSULIN DEGLUDEC 100 U/ML
10 INJECTION, SOLUTION SUBCUTANEOUS DAILY
Status: DISCONTINUED | OUTPATIENT
Start: 2024-10-18 | End: 2024-10-19

## 2024-10-18 RX ORDER — FLUTICASONE PROPIONATE AND SALMETEROL 250; 50 UG/1; UG/1
1 POWDER RESPIRATORY (INHALATION) 2 TIMES DAILY
Status: DISCONTINUED | OUTPATIENT
Start: 2024-10-18 | End: 2024-10-19

## 2024-10-18 NOTE — PLAN OF CARE
Pt alert and oriented x4. Independent. Weaned to 1L NC. IV Solumedrol. IV Rocephin. RA precautions. Con pulse ox. PRN Robitussin given. Safety precautions in place. Call light within reach.    Problem: Patient Centered Care  Goal: Patient preferences are identified and integrated in the patient's plan of care  Description: Interventions:  - What would you like us to know as we care for you? From home with daughter  - Provide timely, complete, and accurate information to patient/family  - Incorporate patient and family knowledge, values, beliefs, and cultural backgrounds into the planning and delivery of care  - Encourage patient/family to participate in care and decision-making at the level they choose  - Honor patient and family perspectives and choices  Outcome: Progressing     Problem: Diabetes/Glucose Control  Goal: Glucose maintained within prescribed range  Description: INTERVENTIONS:  - Monitor Blood Glucose as ordered  - Assess for signs and symptoms of hyperglycemia and hypoglycemia  - Administer ordered medications to maintain glucose within target range  - Assess barriers to adequate nutritional intake and initiate nutrition consult as needed  - Instruct patient on self management of diabetes  Outcome: Progressing     Problem: Patient/Family Goals  Goal: Patient/Family Long Term Goal  Description: Patient's Long Term Goal: free from SOB     Interventions:  - follow POC, IS, med regimen  - See additional Care Plan goals for specific interventions  Outcome: Progressing  Goal: Patient/Family Short Term Goal  Description: Patient's Short Term Goal: free from O2 therapy    Interventions:   - follow POC, med regimen, IS  - See additional Care Plan goals for specific interventions  Outcome: Progressing     Problem: RESPIRATORY - ADULT  Goal: Achieves optimal ventilation and oxygenation  Description: INTERVENTIONS:  - Assess for changes in respiratory status  - Assess for changes in mentation and behavior  -  Position to facilitate oxygenation and minimize respiratory effort  - Oxygen supplementation based on oxygen saturation or ABGs  - Provide Smoking Cessation handout, if applicable  - Encourage broncho-pulmonary hygiene including cough, deep breathe, Incentive Spirometry  - Assess the need for suctioning and perform as needed  - Assess and instruct to report SOB or any respiratory difficulty  - Respiratory Therapy support as indicated  - Manage/alleviate anxiety  - Monitor for signs/symptoms of CO2 retention  Outcome: Progressing     Problem: SAFETY ADULT - FALL  Goal: Free from fall injury  Description: INTERVENTIONS:  - Assess pt frequently for physical needs  - Identify cognitive and physical deficits and behaviors that affect risk of falls.  - [place] fall precautions as indicated by assessment.  - Educate pt/family on patient safety including physical limitations  - Instruct pt to call for assistance with activity based on assessment  - Modify environment to reduce risk of injury  - Provide assistive devices as appropriate  - Consider OT/PT consult to assist with strengthening/mobility  - Encourage toileting schedule  Outcome: Progressing     Problem: DISCHARGE PLANNING  Goal: Discharge to home or other facility with appropriate resources  Description: INTERVENTIONS:  - Identify barriers to discharge w/pt and caregiver  - Include patient/family/discharge partner in discharge planning  - Arrange for needed discharge resources and transportation as appropriate  - Identify discharge learning needs (meds, wound care, etc)  - Arrange for interpreters to assist at discharge as needed  - Consider post-discharge preferences of patient/family/discharge partner  - Complete POLST form as appropriate  - Assess patient's ability to be responsible for managing their own health  - Refer to Case Management Department for coordinating discharge planning if the patient needs post-hospital services based on physician/LIP order  or complex needs related to functional status, cognitive ability or social support system  Outcome: Progressing

## 2024-10-18 NOTE — PLAN OF CARE
Pt A&Ox4, fall precautions in place, admitted for CAP, utilizing O2 therapy, pt calls appropriately. L arm CGM to be removed this morning. BP slightly elevated, BP meds ordered, plan is for ABX, need to verify [facility] admin allergy indication is coming up, per overnight MD. Pts port is bent and unable to be used, an extended was placed in ED US guided. Pt is a unit lab draw. Pt is self care. Calls appropriately. Hourly rounding overnight.       Problem: Patient Centered Care  Goal: Patient preferences are identified and integrated in the patient's plan of care  Description: Interventions:  - What would you like us to know as we care for you? From home with daughter  - Provide timely, complete, and accurate information to patient/family  - Incorporate patient and family knowledge, values, beliefs, and cultural backgrounds into the planning and delivery of care  - Encourage patient/family to participate in care and decision-making at the level they choose  - Honor patient and family perspectives and choices  Outcome: Progressing     Problem: Diabetes/Glucose Control  Goal: Glucose maintained within prescribed range  Description: INTERVENTIONS:  - Monitor Blood Glucose as ordered  - Assess for signs and symptoms of hyperglycemia and hypoglycemia  - Administer ordered medications to maintain glucose within target range  - Assess barriers to adequate nutritional intake and initiate nutrition consult as needed  - Instruct patient on self management of diabetes  Outcome: Progressing     Problem: Patient/Family Goals  Goal: Patient/Family Long Term Goal  Description: Patient's Long Term Goal: free from SOB     Interventions:  - follow POC, IS, med regimen  - See additional Care Plan goals for specific interventions  Outcome: Progressing  Goal: Patient/Family Short Term Goal  Description: Patient's Short Term Goal: free from O2 therapy    Interventions:   - follow POC, med regimen, IS  - See additional Care Plan goals for  specific interventions  Outcome: Progressing

## 2024-10-18 NOTE — CONSULTS
[facility]  part of [facility]      Consult     Chester Bautista Patient Status:  Inpatient    1940 MRN [id number]   Location [facility]5W Attending Sonya Lozano MD   Hosp Day # 1 PCP Heike Sorto MD     Reason for Consultation: chest port malfunction    Subjective:    Chester Bautista is a 84 year old female, history of COPD and pulmonary fibrosis, admitted today via the emergency room for evaluation of progressive cough and shortness of breath.  She is status post chest port placement in , which functioned well until recently.  Chest x-ray and CT scan today demonstrates the catheter tip has migrated into the azygous vein.    History/Other:      Past Medical History:  Past Medical History:    Anxiety state    Cancer (HCC)    breast cancer 2018    Chronic obstructive pulmonary disease, unspecified (HCC)    Chronic obstructive pulmonary disease, unspecified (HCC)    COPD (chronic obstructive pulmonary disease) (HCC)    Diabetes (HCC)    Diabetes mellitus (HCC)    Essential hypertension    Exposure to medical diagnostic radiation    High blood pressure    High cholesterol    History of blood transfusion    low hemoglobin    Osteoarthritis    PONV (postoperative nausea and vomiting)    Pulmonary embolism (HCC)    Pulmonary emphysema (HCC)    Rheumatoid arthritis (HCC)        Past Surgical History:   Past Surgical History:   Procedure Laterality Date    Cataract extraction w/  intraocular lens implant Bilateral 2017    Dr in [facility]     Colonoscopy      Colonoscopy N/A 2022    Procedure: COLONOSCOPY;  Surgeon: Mikey Garcia MD;  Location: [facility] ENDOSCOPY    Colonoscopy N/A 06/15/2023    Procedure: COLONOSCOPY;  Surgeon: Mikey Garcia MD;  Location: [facility] ENDOSCOPY    Correct bunion,simple      right foot      Egd  2023    Dr. Garcia; Duodenal AVM's x4 cauterized    Hysterectomy      , no abnormal Paps, due to heavy bleeding with  fibroids.  Not sure if it had bilateral salpingo-oophorectomy.    Ir aneurysm repairm  2010    Computer assisted volumetric craniofomy with clipping of anterior cerebral artery.    Lumpectomy right      Radiation right      Rotator cuff repair      1993    Total knee replacement Right 2010    Total knee replacement Left 07/02/2015    Transoral incisionless fundoplication - internal  01/25/2012 Fredy fundoplication at [facility] Dr. Fournier.    Fredy fundoplication at [facility] Dr. Fournier.    Trigger finger release      left hand  2005    Yag capsulotomy - ou - both eyes Bilateral 09/2021    done in [place]       Social History:  reports that she quit smoking about 27 years ago. Her smoking use included cigarettes. She has never been exposed to tobacco smoke. She has never used smokeless tobacco. She reports that she does not drink alcohol and does not use drugs.    Family History:   Family History   Problem Relation Age of Onset    Heart Surgery Mother         Leaky valve at 41 y/o.     Prostate Cancer Brother     Cancer Brother     Diabetes Maternal Grandmother     Diabetes Paternal Aunt     Breast Cancer Self 78    Breast Cancer Niece         before 50    Macular degeneration Neg     Glaucoma Neg        Allergies: Allergies[1]    Medications:  Medications Ordered Prior to Encounter[2]    Review of Systems:   Review of systems was completed.  Pertinent positives and negatives noted in the HPI.    Results:    Labs:  Laboratory data reviewed.      Selected labs - last 24 hours:  Endo  Lytes  Renal   Glu 340  Na 133 Ca 8.4  BUN 15   POC Gluc  357  K 4.5 PO4 -  Cr 0.92   A1c -  Cl 104 Mg -  eGFR 61   TSH -  CO2 21.0         LFT  CBC  Other   AST -  WBC 12.1  PTT - Procal -   ALT -  Hb 9.6  INR - CRP -   APk -  Hct 30.9  Trop - D dim -   T russell -  .0  pBNP -  BNP -  - Ferritin  -   Prot -    CK  - Lactate  -   Alb -    LDL  - COVID  -       Imaging: Imaging data reviewed in  Epic.    Assessment & Plan: Chest port malfunction.  Review of chest CT today demonstrates the catheter tip of the chest port has migrated into the azygous vein.  Previous chest x-ray of at the time of placement in 2022 demonstrated catheter well-positioned just beyond the SVC/RA junction.  Given its malposition and poor function, I recommend chest port removal and replacement, which is scheduled for Monday at noon.     Plan of care discussed with Ms. Bautista, who indicates understanding.  It.Is possible that she will be discharged over the weekend, but is able to return for her scheduled procedure on Monday.    Bradley Byole MD  10/18/2024    Supplementary Documentation:                            [1]   Allergies  Allergen Reactions    Celebrex [Celecoxib] PALPITATIONS    Penicillins ITCHING and SWELLING    Metformin And Related UNKNOWN    Olmesartan UNKNOWN   [2]   Current Facility-Administered Medications on File Prior to Encounter   Medication Dose Route Frequency Provider Last Rate Last Admin    [COMPLETED] iron sucrose (Venofer) 20 mg/mL injection 200 mg  200 mg Intravenous Once Eric Mohamud MD   Stopped at 10/09/24 1451    [COMPLETED] octreoTIDe (Sandostatin LAR) IM injection 20 mg  20 mg Intramuscular Once Zoila Mcqueen MD   20 mg at 10/04/24 1409    [COMPLETED] octreoTIDe (Sandostatin LAR) IM injection 20 mg  20 mg Intramuscular Once Zoila Mcqueen MD   20 mg at 09/06/24 1416    [COMPLETED] octreoTIDe (Sandostatin LAR) IM injection 20 mg  20 mg Intramuscular Once Zoila Mcqueen MD   20 mg at 08/02/24 1048    [COMPLETED] alteplase (Activase) injection 2 mg  2 mg Intravenous Once Eric Mohamud MD   2 mg at 08/02/24 1113     Current Outpatient Medications on File Prior to Encounter   Medication Sig Dispense Refill    Potassium Chloride ER 20 MEQ Oral Tab CR Take 40 mEq by mouth every morning. 60 tablet 1    loratadine 10 MG Oral Tab TAKE 1 TABLET BY MOUTH EVERY DAY 30 tablet 0    cyclobenzaprine 10  MG Oral Tab Take 1 tablet (10 mg total) by mouth nightly as needed. 90 tablet 1    predniSONE 5 MG Oral Tab Take 1 tablet (5 mg total) by mouth daily. 90 tablet 0    hydroxychloroquine 200 MG Oral Tab Take 1 tablet (200 mg total) by mouth daily. 90 tablet 0    insulin glargine (LANTUS SOLOSTAR) 100 UNIT/ML Subcutaneous Solution Pen-injector Inject 10 Units into the skin daily with dinner. 3 mL 1    glipiZIDE 5 MG Oral Tab Take 1 tablet (5 mg total) by mouth every morning before breakfast. 30 tablet 1    famotidine 40 MG Oral Tab Take 1 tablet (40 mg total) by mouth 2 (two) times daily.      montelukast 10 MG Oral Tab Take 1 tablet (10 mg total) by mouth nightly. 90 tablet 3    valsartan 80 MG Oral Tab Take 1 tablet (80 mg total) by mouth daily. 90 tablet 3    carvedilol 12.5 MG Oral Tab Take 1 tablet (12.5 mg total) by mouth 2 (two) times daily with meals. 180 tablet 3    fluticasone-salmeterol (WIXELA INHUB) 250-50 MCG/ACT Inhalation Aerosol Powder, Breath Activated Inhale 1 puff into the lungs every 12 (twelve) hours. 1 each 5    gabapentin 100 MG Oral Cap Take 1 capsule (100 mg total) by mouth nightly. 90 capsule 3    rosuvastatin (CRESTOR) 20 MG Oral Tab Take 1 tablet (20 mg total) by mouth nightly. 90 tablet 0    albuterol 108 (90 Base) MCG/ACT Inhalation Aero Soln inhale 2 puff by inhalation route  every 4 - 6 hours as needed 1 each 0    albuterol 108 (90 Base) MCG/ACT Inhalation Aero Soln inhale 2 puff by inhalation route  every 4 - 6 hours as needed 1 each 5    acetaminophen 500 MG Oral Tab Take 1 tablet (500 mg total) by mouth daily.      BD PEN NEEDLE KADEN 2ND GEN 32G X 4 MM Does not apply Misc 1 EACH DAILY. USE DAILY WITH INSULIN 100 each 1    torsemide 20 MG Oral Tab Take 1 tablet (20 mg total) by mouth daily. (Patient not taking: Reported on 10/17/2024)      tamsulosin 0.4 MG Oral Cap Take 1 capsule (0.4 mg total) by mouth daily. (Patient not taking: Reported on 10/17/2024) 90 capsule 1    Lancets Does  not apply Misc Dx. E11.9. Checks qam. 50 each 6    Blood Glucose Monitoring Suppl (BLOOD GLUCOSE SYSTEM GILLES) Does not apply Kit Dx. E11.9. Checks qam. 1 kit 0    Blood Glucose Monitoring Suppl w/Device Does not apply Kit Dx. E11.9. Checks qam. 1 kit 0    Blood Gluc Meter Disp-Strips Does not apply Device Dx. E11.9. Checks qam. 100 each 0    midodrine 2.5 MG Oral Tab Take 1 tablet (2.5 mg total) by mouth in the morning and 1 tablet (2.5 mg total) at noon and 1 tablet (2.5 mg total) in the evening. Hold if BP > 120/80.. (Patient not taking: Reported on 10/17/2024) 90 tablet 0    Accu-Chek FastClix Lancets Does not apply Misc Check sugar once daily as directed (E11.42) 100 each 2    Glucose Blood (ACCU-CHEK GUIDE) In Vitro Strip Check blood sugar twice a day (fasting and before dinner). DX: E11.42, NIDDM (Patient not taking: Reported on 10/17/2024) 200 strip 1    glycopyrrolate 1 MG Oral Tab Take 1 tablet (1 mg total) by mouth 3 (three) times daily. 90 tablet 1    Blood Glucose Monitoring Suppl (ONETOUCH VERIO) w/Device Does not apply Kit 1 each daily. Test 1x daily 1 kit 0    Glucose Blood (ONETOUCH VERIO) In Vitro Strip Test 1x daily (Patient not taking: Reported on 10/17/2024) 100 strip 1    OneTouch Delica Lancets 33G Does not apply Misc 4 x daily 100 each 1    Blood Glucose Monitoring Suppl (BLOOD GLUCOSE SYSTEM GILLES) Does not apply Kit DX E 11.9.  Checks every morning. 1 kit 0    Lancets Does not apply Misc DX E 11.9.  Checks every morning. 50 each 11    Blood Glucose Monitoring Suppl w/Device Does not apply Kit DX E 11.9.  Checks every morning. 1 kit 0

## 2024-10-18 NOTE — SPIRITUAL CARE NOTE
Pt was asleep at the time of visit. Spiritual care is available as needed at #[id number]    Chaplain Loera

## 2024-10-18 NOTE — CM/SW NOTE
10/18/24 1300   / Referral Data   Referral Source Physician   Reason for Referral Discharge planning   Informant Patient   Medical Hx   Does patient have an established PCP? 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  She had history of hypertension, rheumatoid arthritis, diabetes mellitus type 2, hyperlipidemia, anxiety and generalized osteoarthritis   Patient Info   Patient's Current Mental Status at Time of Assessment Alert;Oriented   Patient's Home Environment House   Number of Levels in Home 2   Patient lives with Daughter  (Brea)   Patient Status Prior to Admission   Independent with ADLs and Mobility No   Pt. requires assistance with Driving   Services in place prior to admission DME/Supplies at home   Type of DME/Supplies Straight Cane   Discharge Needs   Anticipated D/C needs To be determined     CM met with patient at bedside to discuss discharge planning. Above assessment completed. Address on file and PCP verified.    Patient lives with her daughter Brea in a two-story home. Patient reports she is independent with mobility at baseline, and has no trouble with stairs at home.  Patient does report she owns a cane, and uses it when out of the house travelling longer distances.  Patient no longer drives, but her daughter Brea is able to assist with transportation needs.    Patient has a history of home health services with [facility] (was recently discharged from services May 2024).  CM inquired if patient would like to resume services with this agency upon discharge.  At time of assessment, patient reports she feels she \"doesn't need their services\" but would like to revisit as discharge approaches.  CM sent tentative home healthcare referral via [facility] to [facility], and  confirmed with liaison Maryan [facility] is able to accept patient for return if needed.  CM entered orders (RN and PT) and face to face.  CM/SW will follow up with patient prior to discharge to determine if agreeable to arrangement.    Patient is requiring supplemental oxygen while inpatient with no history of home oxygen arrangement.  Nursing attempting to wean.  If nursing unable to wean as discharge approaches, home oxygen evaluation may be warranted d/t patient COPD history.  CM/SW will continue to follow during course of stay.    / to remain available for support and/or discharge planning.     Plan: TBD, pending course of stay, medical clearance    Misa Collier RN, BSN  Nurse   [phone]

## 2024-10-18 NOTE — PROGRESS NOTES
Progress Note     Chester Bautista Patient Status:  Inpatient    1940 MRN [id number]   Location [facility]5W Attending Sonya Lozano MD   Hosp Day # 1 PCP Heike Sorto MD     Chief Complaint: patient presented with   Chief Complaint   Patient presents with    Cough/URI       Subjective:   S: Patient denies any SOB,  no cp,     Review of Systems:   10 point ROS completed and was negative, except for pertinent positive and negatives stated in subjective.    Objective:   Vital signs:  Temp:  [97.3 °F (36.3 °C)-100.9 °F (38.3 °C)] 98.4 °F (36.9 °C)  Pulse:  [] 92  Resp:  [15-26] 18  BP: (107-215)/(66-99) 147/75  SpO2:  [89 %-100 %] 91 %    Wt Readings from Last 6 Encounters:   10/17/24 143 lb 9.6 oz (65.1 kg)   10/08/24 146 lb (66.2 kg)   24 145 lb (65.8 kg)   24 144 lb 6.4 oz (65.5 kg)   24 139 lb (63 kg)   24 141 lb (64 kg)         Physical Exam:    General: No acute distress. Alert ,         Respiratory: Clear to auscultation bilaterally. No wheezes. No rhonchi.  Cardiovascular: S1, S2. Regular rate and rhythm. No murmurs, rubs or gallops.   Abdomen: Soft, nontender, nondistended.  Positive bowel sounds. No rebound or guarding.  Neurologic: No focal neurological deficits.   Musculoskeletal: Moves all extremities.  Extremities: No edema.    Results:   Diagnostic Data:      Labs:    Labs Last 24 Hours:   BMP     CBC    Other     Na 133 Cl 104 BUN 15 Glu 340   Hb 9.6   PTT - Procal -   K 4.5 CO2 21.0 Cr 0.92   WBC 12.1 >< .0  INR - CRP -   Renal Lytes Endo    Hct 30.9   Trop - D dim -   eGFR - Ca 8.4 POC Gluc  364    LFT   pBNP 414 Lactic -   eGFR AA - PO4 - A1c -   AST 21 APk 103 Prot 7.2  LDL -     Mg - TSH -   ALT 10 T russell 0.2 Alb 3.4        COVID-19 Lab Results    COVID-19  Lab Results   Component Value Date    COVID19 Not Detected 2024    COVID19 Not Detected 2023    COVID19 Not Detected 10/11/2022       Pro-Calcitonin  No results for  input(s): \"PCT\" in the last 168 hours.    Cardiac  Recent Labs   Lab 10/17/24  1558   PBNP 414       Creatinine Kinase  No results for input(s): \"CK\" in the last 168 hours.    Inflammatory Markers  No results for input(s): \"CRP\", \"ROSALIE\", \"LDH\", \"DDIMER\" in the last 168 hours.    Imaging: Imaging data reviewed in Epic.    Medications:    fluticasone-salmeterol  1 puff Inhalation BID    cefTRIAXone  2 g Intravenous Q24H    insulin glargine  10 Units Subcutaneous Daily with dinner    rosuvastatin  20 mg Oral Nightly    ipratropium-albuterol  3 mL Nebulization q4h    losartan  50 mg Oral Daily    enoxaparin  40 mg Subcutaneous Daily    insulin aspart  1-11 Units Subcutaneous TID CC    methylPREDNISolone  40 mg Intravenous Q8H    azithromycin  500 mg Oral Daily    carvedilol  12.5 mg Oral BID with meals       Assessment & Plan:   ASSESSMENT / PLAN:     Problem List Items Addressed This Visit          HCC Problems    * (Principal) Acute respiratory failure with hypoxia (HCC) - Primary    Relevant Medications    guaiFENesin (Robitussin) 100 MG/5 ML oral liquid 200 mg    benzonatate (Tessalon) cap 200 mg    ipratropium-albuterol (Duoneb) 0.5-2.5 (3) MG/3ML inhalation solution 3 mL    methylPREDNISolone sodium succinate (Solu-MEDROL) injection 40 mg    fluticasone-salmeterol (Advair Diskus) 250-50 MCG/ACT inhaler 1 puff    ipratropium-albuterol (Duoneb) 0.5-2.5 (3) MG/3ML inhalation solution 3 mL    ipratropium-albuterol (Duoneb) 0.5-2.5 (3) MG/3ML inhalation solution 3 mL       Pulmonary and Pneumonias    Pneumonia of both lungs due to infectious organism, unspecified part of lung    Relevant Medications    cefTRIAXone (Rocephin) 2 g in sodium chloride 0.9% 100 mL IVPB-ADDV (Completed)    azithromycin (Zithromax) tab 500 mg (Completed)    guaiFENesin (Robitussin) 100 MG/5 ML oral liquid 200 mg    benzonatate (Tessalon) cap 200 mg    ipratropium-albuterol (Duoneb) 0.5-2.5 (3) MG/3ML inhalation solution 3 mL     methylPREDNISolone sodium succinate (Solu-MEDROL) injection 40 mg    azithromycin (Zithromax) tab 500 mg (Start on 10/18/2024  8:00 PM)    fluticasone-salmeterol (Advair Diskus) 250-50 MCG/ACT inhaler 1 puff    cefTRIAXone (Rocephin) 2 g in sodium chloride 0.9% 100 mL IVPB-ADDV (Start on 10/18/2024  7:00 PM)    ipratropium-albuterol (Duoneb) 0.5-2.5 (3) MG/3ML inhalation solution 3 mL    ipratropium-albuterol (Duoneb) 0.5-2.5 (3) MG/3ML inhalation solution 3 mL     84-year-old female with past medical history of pulmonary fibrosis, COPD, hypertension, type 2 diabetes mellitus, hyperlipidemia, anxiety presents with COPD exacerbation and pneumonia      COPD exacerbation  Pulmonary is consulted  Continue IV steroids  Continue nebs      Community-acquired pneumonia  Continue IV antibiotics  Pulmonary following  Blood cultures and sputum cultures are pending      Diabetes mellitus  Uncontrolled  Lantus daily at night  Continue sliding scale    With a history of breast cancer, she has a port for chemotherapy which is currently used for iron infusion  Her port is not working at this time therefore we will consult IR for recommendation.    Quality:  DVT Prophylaxis: lovenox  CODE status: full   DISPO: pending clinical improvement.   Estimated date of discharge: To be determined  Discharge is dependent on: Improved clinical status  At this point Patient is expected to be discharge to: Home versus rehab      Plan of care discussed with Patient and RN.     Coordinated care with providers and counseling re: treatment plan and workup     Sonya Lozano MD    Supplementary Documentation:         **Certification      PHYSICIAN Certification of Need for Inpatient Hospitalization - Initial Certification    Patient will require inpatient services that will reasonably be expected to span two midnight's based on the clinical documentation in H+P.   Based on patients current state of illness, I anticipate that, after discharge, patient  will require TBD.             I personally reviewed the available laboratories, imaging including operative report. I discussed/will discuss the case with patient and her nurse. I ordered laboratories studies. I adjusted medications including not applicable today. Medical decision making high, risk is high.     >55min spent, >50% spent counseling and coordinating care in the form of educating pt/family and d/w consultants and staff. Most of the time spent discussing the above plan.

## 2024-10-18 NOTE — CONSULTS
[facility]  part of [facility]    Report of Consultation    Chester Bautista Patient Status:  Inpatient    1940 MRN [id number]   Location [facility]5W Attending Sonya Lozano MD   Hosp Day # 1 PCP Heike Sorto MD     Date of Admission:  10/17/2024    Reason for Consultation:   Dyspnea    History of Present Illness:   Patient is a 84-year-old female who presents with evidence of increased ongoing dyspnea with some hypoxia over the course of last several days.  Denies use of home oxygen therapy.  States she has been compliant with maintenance inhaler therapy with Wixela.  Admits to some increased cough primarily nonproductive in nature.  Denies significant wheezing.  Denies recent fever or history of pneumonia.  Mild improvement in dyspnea during hospital course    Past Medical History  Past Medical History:    Anxiety state    Cancer (HCC)    breast cancer     Chronic obstructive pulmonary disease, unspecified (HCC)    Chronic obstructive pulmonary disease, unspecified (HCC)    COPD (chronic obstructive pulmonary disease) (HCC)    Diabetes (HCC)    Diabetes mellitus (HCC)    Essential hypertension    Exposure to medical diagnostic radiation    High blood pressure    High cholesterol    History of blood transfusion    low hemoglobin    Osteoarthritis    PONV (postoperative nausea and vomiting)    Pulmonary embolism (HCC)    Pulmonary emphysema (HCC)    Rheumatoid arthritis (HCC)       Past Surgical History  Past Surgical History:   Procedure Laterality Date    Cataract extraction w/  intraocular lens implant Bilateral 2017    [facility]     Colonoscopy      Colonoscopy N/A 2022    Procedure: COLONOSCOPY;  Surgeon: Mikey Garcia MD;  Location: [facility] ENDOSCOPY    Colonoscopy N/A 06/15/2023    Procedure: COLONOSCOPY;  Surgeon: Mikey Garcia MD;  Location: [facility] ENDOSCOPY    Correct bunion,simple      right foot      Egd  2023    Dr. Garcia;  Duodenal AVM's x4 cauterized    Hysterectomy      , no abnormal Paps, due to heavy bleeding with fibroids.  Not sure if it had bilateral salpingo-oophorectomy.    Ir aneurysm repairm      Computer assisted volumetric craniofomy with clipping of anterior cerebral artery.    Lumpectomy right      Radiation right      Rotator cuff repair      1993    Total knee replacement Right 2010    Total knee replacement Left 2015    Transoral incisionless fundoplication - internal  2012 Fredy fundoplication at [facility] Dr. Fournier.    Fredy fundoplication at [facility] Dr. Fournier.    Trigger finger release      left hand      Yag capsulotomy - ou - both eyes Bilateral 2021    done in [place]       Family History  Family History   Problem Relation Age of Onset    Heart Surgery Mother         Leaky valve at 41 y/o.     Prostate Cancer Brother     Cancer Brother     Diabetes Maternal Grandmother     Diabetes Paternal Aunt     Breast Cancer Self 78    Breast Cancer Niece         before 50    Macular degeneration Neg     Glaucoma Neg        Social History  Social History     Socioeconomic History    Marital status:    Tobacco Use    Smoking status: Former     Current packs/day: 0.00     Types: Cigarettes     Quit date:      Years since quittin.8     Passive exposure: Never    Smokeless tobacco: Never    Tobacco comments:     [id number]   Vaping Use    Vaping status: Never Used   Substance and Sexual Activity    Alcohol use: Never    Drug use: Never           Current Medications:  Current Facility-Administered Medications   Medication Dose Route Frequency    acetaminophen (Tylenol Extra Strength) tab 500 mg  500 mg Oral Q4H PRN    enoxaparin (Lovenox) 40 MG/0.4ML SUBQ injection 40 mg  40 mg Subcutaneous Daily    ondansetron (Zofran) 4 MG/2ML injection 4 mg  4 mg Intravenous Q6H PRN    metoclopramide (Reglan) 5 mg/mL injection 5 mg  5 mg Intravenous Q8H PRN     temazepam (Restoril) cap 15 mg  15 mg Oral Nightly PRN    guaiFENesin (Robitussin) 100 MG/5 ML oral liquid 200 mg  200 mg Oral Q4H PRN    benzonatate (Tessalon) cap 200 mg  200 mg Oral TID PRN    glucose (Dex4) 15 GM/59ML oral liquid 15 g  15 g Oral Q15 Min PRN    Or    glucose (Glutose) 40% oral gel 15 g  15 g Oral Q15 Min PRN    Or    glucose-vitamin C (Dex-4) chewable tab 4 tablet  4 tablet Oral Q15 Min PRN    Or    dextrose 50% injection 50 mL  50 mL Intravenous Q15 Min PRN    Or    glucose (Dex4) 15 GM/59ML oral liquid 30 g  30 g Oral Q15 Min PRN    Or    glucose (Glutose) 40% oral gel 30 g  30 g Oral Q15 Min PRN    Or    glucose-vitamin C (Dex-4) chewable tab 8 tablet  8 tablet Oral Q15 Min PRN    insulin aspart (NovoLOG) 100 Units/mL FlexPen 1-11 Units  1-11 Units Subcutaneous TID CC    ipratropium-albuterol (Duoneb) 0.5-2.5 (3) MG/3ML inhalation solution 3 mL  3 mL Nebulization Q6H PRN    methylPREDNISolone sodium succinate (Solu-MEDROL) injection 40 mg  40 mg Intravenous Q8H    azithromycin (Zithromax) tab 500 mg  500 mg Oral Daily    carvedilol (Coreg) tab 12.5 mg  12.5 mg Oral BID with meals    labetalol (Trandate) 5 mg/mL injection 10 mg  10 mg Intravenous Q4H PRN     Medications Prior to Admission   Medication Sig    Potassium Chloride ER 20 MEQ Oral Tab CR Take 40 mEq by mouth every morning.    loratadine 10 MG Oral Tab TAKE 1 TABLET BY MOUTH EVERY DAY    cyclobenzaprine 10 MG Oral Tab Take 1 tablet (10 mg total) by mouth nightly as needed.    predniSONE 5 MG Oral Tab Take 1 tablet (5 mg total) by mouth daily.    hydroxychloroquine 200 MG Oral Tab Take 1 tablet (200 mg total) by mouth daily.    insulin glargine (LANTUS SOLOSTAR) 100 UNIT/ML Subcutaneous Solution Pen-injector Inject 10 Units into the skin daily with dinner.    glipiZIDE 5 MG Oral Tab Take 1 tablet (5 mg total) by mouth every morning before breakfast.    famotidine 40 MG Oral Tab Take 1 tablet (40 mg total) by mouth 2 (two) times daily.     montelukast 10 MG Oral Tab Take 1 tablet (10 mg total) by mouth nightly.    valsartan 80 MG Oral Tab Take 1 tablet (80 mg total) by mouth daily.    carvedilol 12.5 MG Oral Tab Take 1 tablet (12.5 mg total) by mouth 2 (two) times daily with meals.    fluticasone-salmeterol (WIXELA INHUB) 250-50 MCG/ACT Inhalation Aerosol Powder, Breath Activated Inhale 1 puff into the lungs every 12 (twelve) hours.    gabapentin 100 MG Oral Cap Take 1 capsule (100 mg total) by mouth nightly.    rosuvastatin (CRESTOR) 20 MG Oral Tab Take 1 tablet (20 mg total) by mouth nightly.    albuterol 108 (90 Base) MCG/ACT Inhalation Aero Soln inhale 2 puff by inhalation route  every 4 - 6 hours as needed    albuterol 108 (90 Base) MCG/ACT Inhalation Aero Soln inhale 2 puff by inhalation route  every 4 - 6 hours as needed    acetaminophen 500 MG Oral Tab Take 1 tablet (500 mg total) by mouth daily.    BD PEN NEEDLE KADEN 2ND GEN 32G X 4 MM Does not apply Misc 1 EACH DAILY. USE DAILY WITH INSULIN    [] glucagon (GVOKE HYPOPEN 2-PACK) 1 MG/0.2ML Subcutaneous injection Inject 0.2 mL (1 mg total) into the skin once as needed for Low blood glucose.    torsemide 20 MG Oral Tab Take 1 tablet (20 mg total) by mouth daily. (Patient not taking: Reported on 10/17/2024)    tamsulosin 0.4 MG Oral Cap Take 1 capsule (0.4 mg total) by mouth daily. (Patient not taking: Reported on 10/17/2024)    Lancets Does not apply Misc Dx. E11.9. Checks qam.    Blood Glucose Monitoring Suppl (BLOOD GLUCOSE SYSTEM GILLES) Does not apply Kit Dx. E11.9. Checks qam.    Blood Glucose Monitoring Suppl w/Device Does not apply Kit Dx. E11.9. Checks qam.    Blood Gluc Meter Disp-Strips Does not apply Device Dx. E11.9. Checks qam.    midodrine 2.5 MG Oral Tab Take 1 tablet (2.5 mg total) by mouth in the morning and 1 tablet (2.5 mg total) at noon and 1 tablet (2.5 mg total) in the evening. Hold if BP > 120/80.. (Patient not taking: Reported on 10/17/2024)    Accu-Chek FastClix  Lancets Does not apply Misc Check sugar once daily as directed (E11.42)    Glucose Blood (ACCU-CHEK GUIDE) In Vitro Strip Check blood sugar twice a day (fasting and before dinner). DX: E11.42, NIDDM (Patient not taking: Reported on 10/17/2024)    glycopyrrolate 1 MG Oral Tab Take 1 tablet (1 mg total) by mouth 3 (three) times daily.    Blood Glucose Monitoring Suppl (ONETOUCH VERIO) w/Device Does not apply Kit 1 each daily. Test 1x daily    Glucose Blood (ONETOUCH VERIO) In Vitro Strip Test 1x daily (Patient not taking: Reported on 10/17/2024)    OneTouch Delica Lancets 33G Does not apply Misc 4 x daily    Blood Glucose Monitoring Suppl (BLOOD GLUCOSE SYSTEM GILLES) Does not apply Kit DX E 11.9.  Checks every morning.    Lancets Does not apply Misc DX E 11.9.  Checks every morning.    Blood Glucose Monitoring Suppl w/Device Does not apply Kit DX E 11.9.  Checks every morning.       Allergies  Allergies[1]    Review of Systems:   Constitutional: denies fevers, chills, weakness, fatigue, recent illness  HEENT: denies headache, sore throat, vision loss  Cardio: denies chest pain, chest pressure, palpitations  Respiratory: dyspnea, cough, denies wheezing, hemoptysis   GI: denies nausea, vomiting, abdominal pain  : denies dysuria, hematuria  Musculoskeletal: denies arthralgia, myalgia  Integumentary: denies rash, itching  Neurological: denies syncope, weakness, dizziness,   Psychiatric: denies depression, anxiety  Hematologic: denies bruising        Physical Exam:   Blood pressure (!) 168/80, pulse 93, temperature 97.3 °F (36.3 °C), temperature source Oral, resp. rate 18, height 5' 1\" (1.549 m), weight 143 lb 9.6 oz (65.1 kg), SpO2 96%.    Constitutional: no acute distress  Eyes: PERRL  ENT: nares patent  Neck: neck supple, no JVD  Cardio: RRR, S1 S2  Respiratory: Scattered crackles with some expiratory Rales  GI: abdomen soft, non tender, active bowel souds, no organomegaly  Extremities: no clubbing, cyanosis,  edema  Neurologic: no gross motor deficits  Skin: warm, dry    Results:   Laboratory Data  Lab Results   Component Value Date    WBC 12.1 (H) 10/18/2024    HGB 9.6 (L) 10/18/2024    HCT 30.9 (L) 10/18/2024    .0 10/18/2024    CREATSERUM 0.92 10/18/2024    BUN 15 10/18/2024     (L) 10/18/2024    K 4.5 10/18/2024     10/18/2024    CO2 21.0 10/18/2024     (H) 10/18/2024    CA 8.4 (L) 10/18/2024    ALB 3.4 10/17/2024    ALKPHO 103 10/17/2024    TP 7.2 10/17/2024    AST 21 10/17/2024    ALT 10 10/17/2024    PTT 69.9 (H) 02/28/2024    INR 0.93 12/14/2023    PTP 13.0 12/14/2023    TSH 0.622 10/20/2023    DDIMER 1.60 (H) 02/23/2024    ESRML 83 (H) 09/06/2024    CRP 2.90 (H) 09/06/2024    MG 1.8 08/02/2024    PHOS 3.9 06/07/2024    B12 748 10/20/2023         Imaging  CT CHEST PE AORTA (IV ONLY) (CPT=71260)    Result Date: 10/17/2024  CONCLUSION:  1. No pulmonary embolus. 2. Emphysema with fibrotic changes in periphery of mid and upper lungs.  More dense confluent opacities in the right upper lobe and right middle lobe and to a lesser extent lingula may be related to acute pneumonia or more pronounced fibrosis.  2.6 cm ground-glass opacity in the posterior right upper lobe may represent pneumonia or a new area of fibrosis. 3. Mild nonspecific paratracheal lymph node enlargement. 4. Tip of Port-A-Cath is looped back on itself in the SVC.     Dictated by (CST): Michael Gonzalez MD on 10/17/2024 at 5:58 PM     Finalized by (CST): Michael Gonzalez MD on 10/17/2024 at 6:12 PM           Assessment   1.  Acute hypoxemic respiratory failure  2.  Likely multifocal pneumonia  3.  COPD with acute exacerbation  4.  Leukocytosis  5.  Fevers  6.  Diabetes mellitus    Plan   -Patient presents with ongoing dyspnea cough for over 2 weeks duration hypoxic on presentation.  -CT chest on presentation with no evidence of pulmonary embolism seen.  Emphysematous changes with fibrotic changes and mid and upper lungs.  More  dense opacity seen in right upper and middle lobe with 2.6 cm groundglass opacity in right upper lobe seen.  -Given CT findings agree with empiric coverage with Rocephin and azithromycin at this time.  Sputum culture pending.  Blood culture ordered.  -Follow fever curve.  -CT imaging likely suggestive of CPFE.  Recommend outpatient pulmonary function testing.  -IV steroids and gradually wean  -Scheduled nebulizer treatments  -ICS/LABA  -Evaluate for home oxygen prior to discharge  -Reviewed vitals, labs and imaging    Damon Williamson DO  Pulmonary Critical Care Medicine  [facility]  10/18/2024  8:57 AM        [1]   Allergies  Allergen Reactions    Celebrex [Celecoxib] PALPITATIONS    Penicillins ITCHING and SWELLING    Metformin And Related UNKNOWN    Olmesartan UNKNOWN

## 2024-10-18 NOTE — H&P
[facility]    PATIENT'S NAME: CARLOS EDUARDO PARR   ATTENDING PHYSICIAN: Raegan Manzo MD   PATIENT ACCOUNT#:   [id number]    LOCATION:  [facility]  MEDICAL RECORD #:   [id number]       YOB: 1940  ADMISSION DATE:       10/17/2024    HISTORY AND PHYSICAL EXAMINATION    CHIEF COMPLAINT:  Community-acquired pneumonia and COPD exacerbation.     HISTORY OF PRESENT ILLNESS:  The patient is an 84-year-old  female with underlying pulmonary fibrosis and chronic obstructive pulmonary disease.  Presented today to the Emergency Department for evaluation of low pulse ox and progressive cough and shortness of breath.  Upon arrival to the emergency room, noted to have pulse ox of 89% on room air.  Started on nasal cannula oxygen support.  CBC shows white blood cell count of 12.2 with left shift.  Chemistry and liver function tests, troponin, ProBNP were negative. Chest x-ray and CT scan of the chest showed multifocal pneumonitis, right middle lobe and lingula with background of pulmonary fibrosis.  Patient was started on IV antibiotics and she will be admitted to the hospital for further management.     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  She had history of hypertension, rheumatoid arthritis, diabetes mellitus type 2, hyperlipidemia, anxiety and generalized osteoarthritis.    PAST SURGICAL HISTORY:  Cataract procedure, IVC filter insertion and removal, total abdominal hysterectomy, right breast lumpectomy for breast cancer plus radiation therapy, bilateral total knee arthroplasties, right bunionectomy, Nissen fundoplication and shoulder rotator cuff repair.    MEDICATIONS:  Please see medication reconciliation list.     ALLERGIES:  Celebrex, penicillin, metformin and olmesartan.     FAMILY HISTORY:  Mother had heart valve  disorder.    SOCIAL HISTORY:  Ex-tobacco user.  No current tobacco, alcohol or drug use.  Lives with her family.  Independent for basic activities of daily living.     REVIEW OF SYSTEMS:  Patient reports progressive cough, productive of yellowish phlegm.  Denies any fever or chills.  She had wheezing and shortness of breath for the last 2 or 3 days.  Denies any dysphagia or choking on liquids or solid food.  No sick contacts.  Other 12-point review of systems is negative.       PHYSICAL EXAMINATION:    GENERAL:  Alert and oriented to time, place and person.  Moderate distress.   VITAL SIGNS:  Temperature 96.9, pulse 101, respiratory rate 26, blood pressure 141/87, pulse ox 89% on room air, 100% on 2L nasal cannula oxygen.  HEENT:  Atraumatic.  Oropharynx clear.  Dry mucous membranes.  Normal heart and soft palate.  Eyes:  Anicteric sclerae.   NECK:  Supple.  No lymphadenopathy.  Trachea midline.  Full range of motion.  LUNGS:  Bilateral rhonchi and faint end expiratory wheezing.   HEART:  Regular rate and rhythm.  S1 and S2 auscultated.  No murmur.  ABDOMEN:  Soft, nondistended.  No tenderness.  Positive bowel sounds.   EXTREMITIES:  Trace edema.  No clubbing or cyanosis.  NEUROLOGIC:  Examination of motor and sensory intact.     ASSESSMENT:    1.   Acute-on-chronic hypoxemic respiratory failure.  2.   Chronic obstructive pulmonary disease exacerbation.  3.   Multifocal pneumonitis.  4.   Diabetes mellitus type 2.     PLAN:  Patient will be admitted to general medical floor.  IV Solu-Medrol nebulizer treatments, Rocephin, azithromycin, sputum culture, Pulmonary consult, oxygen support, DVT prophylaxis.  Monitor her clinical status closely.  Further recommendations to follow.     Dictated By Raegan Manzo MD  d: 10/17/2024 19:48:54  t: 10/17/2024 22:27:55  Job [id number]  FB/

## 2024-10-19 VITALS
WEIGHT: 143.63 LBS | HEART RATE: 89 BPM | HEIGHT: 61 IN | BODY MASS INDEX: 27.12 KG/M2 | RESPIRATION RATE: 18 BRPM | SYSTOLIC BLOOD PRESSURE: 144 MMHG | OXYGEN SATURATION: 96 % | TEMPERATURE: 98 F | DIASTOLIC BLOOD PRESSURE: 68 MMHG

## 2024-10-19 LAB
GLUCOSE BLDC GLUCOMTR-MCNC: 295 MG/DL (ref 70–99)
GLUCOSE BLDC GLUCOMTR-MCNC: 429 MG/DL (ref 70–99)

## 2024-10-19 PROCEDURE — 99239 HOSP IP/OBS DSCHRG MGMT >30: CPT | Performed by: FAMILY MEDICINE

## 2024-10-19 PROCEDURE — 99233 SBSQ HOSP IP/OBS HIGH 50: CPT | Performed by: INTERNAL MEDICINE

## 2024-10-19 RX ORDER — DOXYCYCLINE 100 MG/1
100 CAPSULE ORAL 2 TIMES DAILY
Qty: 20 CAPSULE | Refills: 0 | Status: SHIPPED | OUTPATIENT
Start: 2024-10-19

## 2024-10-19 NOTE — DISCHARGE SUMMARY
[facility]  part of [facility]    Discharge Summary    Chester Bautista Patient Status:  Inpatient    1940 MRN [id number]   Location [facility]5W Attending Sonya Lozano MD   Hosp Day # 2 PCP Heike Sorto MD     Date of Admission: 10/17/2024 Disposition: Home or Self Care     Date of Discharge: 10/19/2024     Admitting Diagnosis: Acute respiratory failure with hypoxia (HCC) [J96.01]  Pneumonia of both lungs due to infectious organism, unspecified part of lung [J18.9]    Hospital Discharge Diagnoses:   COPD exacerbation  Community Acquired pneumonia  H/o Breast cancer     Lace+ Score: 73  59-90 High Risk  29-58 Medium Risk  0-28   Low Risk.    TCM Follow-Up Recommendation:  LACE > 58: High Risk of readmission after discharge from the hospital.      Problem List:   Patient Active Problem List   Diagnosis    Adult general medical examination    Vaccine counseling    Colon cancer screening    Type 2 diabetes mellitus without retinopathy (HCC)    Sarcoidosis    Anemia, iron deficiency    Dyslipidemia    Hypercalcemia    Hypertension    Infiltrating ductal carcinoma of right breast, stage 1 (HCC)    Rheumatoid arthritis (HCC)    Osteoarthrosis    Peripheral vascular disease due to secondary diabetes mellitus (HCC)    Malabsorption (HCC)    Neuropathy    AVM (arteriovenous malformation) of duodenum, acquired    Pseudophakia of both eyes    Vitreous floaters of both eyes    Glaucoma suspect of both eyes    Iron deficiency anemia due to chronic blood loss    Malabsorption of iron (HCC)    Port-A-Cath in place    Chronic obstructive pulmonary disease, unspecified (HCC)    Adnexal mass    Encounter for care related to vascular access port    High risk medication use    Meibomian gland dysfunction (MGD) of both eyes    Hypokalemia    Acute pulmonary embolism (HCC)    VTE (venous thromboembolism)    Acute on chronic blood loss anemia    Hyperkalemia    DARWIN (acute kidney injury)  (HCC)    Hypomagnesemia    Constipation    Urinary retention    AVM (arteriovenous malformation) of duodenum, acquired with hemorrhage    Orthostatic dizziness    Gastric AVM    Other pulmonary embolism without acute cor pulmonale (HCC)    AVM (arteriovenous malformation) (HCC)    Leukocytosis    Presence of IVC filter    CKD (chronic kidney disease) stage 3, GFR 30-59 ml/min ([facility])    Dry eye syndrome of both eyes    Acute respiratory failure with hypoxia ([facility])    Pneumonia of both lungs due to infectious organism, unspecified part of lung    Pneumonia       Physical Exam:   General appearance: alert, appears stated age and cooperative  Pulmonary:  clear to auscultation bilaterally  Cardiovascular: S1, S2 normal, no murmur, click, rub or gallop, regular rate and rhythm  Abdominal: soft, non-tender; bowel sounds normal; no masses,  no organomegaly  Extremities: extremities normal, atraumatic, no cyanosis or edema  Psychiatric: calm      History of Present Illness: The patient is an 84-year-old  female with underlying pulmonary fibrosis and chronic obstructive pulmonary disease. Presented today to the Emergency Department for evaluation of low pulse ox and progressive cough and shortness of breath.     Hospital Course: 84-year-old female with past medical history of pulmonary fibrosis, COPD, hypertension, type 2 diabetes mellitus, hyperlipidemia, anxiety presents with COPD exacerbation and pneumonia, pulmonary was consulted patient was started on conservative management with DuoNebs IV steroids and antibiotics.  Patient did well over the course of the day and she was discharged home on p.o. prednisone taper and p.o. doxycycline.     IR was consulted secondary to malfunctioning Port-A-Cath, IR provided her with an outpatient follow-up for management of port a cath.      Consultations: Pulmonary       Procedures:     CT Chest PE protocol     CONCLUSION:   1. No pulmonary embolus.   2. Emphysema with fibrotic  changes in periphery of mid and upper lungs.  More dense confluent opacities in the right upper lobe and right middle lobe and to a lesser extent lingula may be related to acute pneumonia or more pronounced fibrosis.  2.6 cm   ground-glass opacity in the posterior right upper lobe may represent pneumonia or a new area of fibrosis.   3. Mild nonspecific paratracheal lymph node enlargement.   4. Tip of Port-A-Cath is looped back on itself in the SVC.       Complications: none     Discharge Condition: Good    Discharge Medications:      Discharge Medications        START taking these medications        Instructions Prescription details   Doxycycline Monohydrate 100 MG Caps  Commonly known as: MONODOX      Take 1 capsule (100 mg total) by mouth 2 (two) times daily.   Quantity: 20 capsule  Refills: 0            CONTINUE taking these medications        Instructions Prescription details   acetaminophen 500 MG Tabs  Commonly known as: Tylenol Extra Strength      Take 1 tablet (500 mg total) by mouth daily.   Refills: 0     albuterol 108 (90 Base) MCG/ACT Aers  Commonly known as: Ventolin HFA      inhale 2 puff by inhalation route  every 4 - 6 hours as needed   Quantity: 1 each  Refills: 5     albuterol 108 (90 Base) MCG/ACT Aers  Commonly known as: Ventolin HFA      inhale 2 puff by inhalation route  every 4 - 6 hours as needed   Quantity: 1 each  Refills: 0     BD Pen Needle Tata 2nd Gen 32G X 4 MM Misc  Generic drug: Insulin Pen Needle      1 EACH DAILY. USE DAILY WITH INSULIN   Quantity: 100 each  Refills: 1     Blood Gluc Meter Disp-Strips Nica      Dx. E11.9. Checks qam.   Quantity: 100 each  Refills: 0     Blood Glucose System Adrian Kit      DX E 11.9.  Checks every morning.   Quantity: 1 kit  Refills: 0     Blood Glucose Monitoring Suppl w/Device Kit      DX E 11.9.  Checks every morning.   Quantity: 1 kit  Refills: 0     OneTouch Verio w/Device Kit      1 each daily. Test 1x daily   Quantity: 1 kit  Refills: 0      Blood Glucose System Adrian Kit      Dx. E11.9. Checks qam.   Quantity: 1 kit  Refills: 0     Blood Glucose Monitoring Suppl w/Device Kit      Dx. E11.9. Checks qam.   Quantity: 1 kit  Refills: 0     carvedilol 12.5 MG Tabs  Commonly known as: Coreg      Take 1 tablet (12.5 mg total) by mouth 2 (two) times daily with meals.   Quantity: 180 tablet  Refills: 3     cyclobenzaprine 10 MG Tabs  Commonly known as: Flexeril      Take 1 tablet (10 mg total) by mouth nightly as needed.   Quantity: 90 tablet  Refills: 1     famotidine 40 MG Tabs  Commonly known as: Pepcid      Take 1 tablet (40 mg total) by mouth 2 (two) times daily.   Refills: 0     fluticasone-salmeterol 250-50 MCG/ACT Aepb  Commonly known as: Wixela Inhub      Inhale 1 puff into the lungs every 12 (twelve) hours.   Quantity: 1 each  Refills: 5     gabapentin 100 MG Caps  Commonly known as: Neurontin      Take 1 capsule (100 mg total) by mouth nightly.   Quantity: 90 capsule  Refills: 3     glipiZIDE 5 MG Tabs  Commonly known as: Glucotrol      Take 1 tablet (5 mg total) by mouth every morning before breakfast.   Quantity: 30 tablet  Refills: 1     glycopyrrolate 1 MG Tabs  Commonly known as: Robinul      Take 1 tablet (1 mg total) by mouth 3 (three) times daily.   Quantity: 90 tablet  Refills: 1     hydroxychloroquine 200 MG Tabs  Commonly known as: Plaquenil      Take 1 tablet (200 mg total) by mouth daily.   Quantity: 90 tablet  Refills: 0     Lancets Misc      DX E 11.9.  Checks every morning.   Quantity: 50 each  Refills: 11     OneTouch Delica Lancets 33G Misc      4 x daily   Quantity: 100 each  Refills: 1     Accu-Chek FastClix Lancets Misc      Check sugar once daily as directed (E11.42)   Quantity: 100 each  Refills: 2     Lancets Misc      Dx. E11.9. Checks qam.   Quantity: 50 each  Refills: 6     Lantus SoloStar 100 UNIT/ML Sopn  Generic drug: insulin glargine      Inject 10 Units into the skin daily with dinner.   Quantity: 3 mL  Refills: 1      loratadine 10 MG Tabs  Commonly known as: Claritin      TAKE 1 TABLET BY MOUTH EVERY DAY   Quantity: 30 tablet  Refills: 0     montelukast 10 MG Tabs  Commonly known as: Singulair      Take 1 tablet (10 mg total) by mouth nightly.   Quantity: 90 tablet  Refills: 3     Potassium Chloride ER 20 MEQ Tbcr      Take 40 mEq by mouth every morning.   Quantity: 60 tablet  Refills: 1     predniSONE 5 MG Tabs  Commonly known as: Deltasone      Take 1 tablet (5 mg total) by mouth daily.   Quantity: 90 tablet  Refills: 0     rosuvastatin 20 MG Tabs  Commonly known as: Crestor      Take 1 tablet (20 mg total) by mouth nightly.   Quantity: 90 tablet  Refills: 0     valsartan 80 MG Tabs  Commonly known as: Diovan      Take 1 tablet (80 mg total) by mouth daily.   Quantity: 90 tablet  Refills: 3            STOP taking these medications      Accu-Chek Guide Strp        Gvoke HypoPen 2-Pack 1 MG/0.2ML injection  Generic drug: glucagon        midodrine 2.5 MG Tabs  Commonly known as: ProAmatine        OneTouch Verio Strp        tamsulosin 0.4 MG Caps  Commonly known as: Flomax        torsemide 20 MG Tabs  Commonly known as: Demadex                  Where to Get Your Medications        These medications were sent to [id number] - [place], [place] - [address] [phone], [phone]  [address] [address]      Phone: [phone]   Doxycycline Monohydrate 100 MG Caps         Follow up Visits: Follow-up with PCP as recommeded.     Sonya Lozano MD  10/19/2024  12:11 PM    > 35 min    rehabilitation facility

## 2024-10-19 NOTE — PLAN OF CARE
A&Ox4. Pt ambulates self, monitoring blood glucose levels per order- ACHS, voiding up to bathroom, tolerating diet, on room air, tessalon provided as needed for cough. Frequent rounding by nursing staff. Safety precautions maintained/call light within reach.  Patient discharged to home with daughter. IV removed, discharge education provided, patient sent with all personal belongings, and discharge instructions. Addressed additional questions.    Problem: Patient Centered Care  Goal: Patient preferences are identified and integrated in the patient's plan of care  Description: Interventions:  - What would you like us to know as we care for you? From home with daughter  - Provide timely, complete, and accurate information to patient/family  - Incorporate patient and family knowledge, values, beliefs, and cultural backgrounds into the planning and delivery of care  - Encourage patient/family to participate in care and decision-making at the level they choose  - Honor patient and family perspectives and choices  Outcome: Adequate for Discharge     Problem: Diabetes/Glucose Control  Goal: Glucose maintained within prescribed range  Description: INTERVENTIONS:  - Monitor Blood Glucose as ordered  - Assess for signs and symptoms of hyperglycemia and hypoglycemia  - Administer ordered medications to maintain glucose within target range  - Assess barriers to adequate nutritional intake and initiate nutrition consult as needed  - Instruct patient on self management of diabetes  Outcome: Adequate for Discharge     Problem: Patient/Family Goals  Goal: Patient/Family Long Term Goal  Description: Patient's Long Term Goal: free from SOB     Interventions:  - follow POC, IS, med regimen  - See additional Care Plan goals for specific interventions  Outcome: Adequate for Discharge  Goal: Patient/Family Short Term Goal  Description: Patient's Short Term Goal: free from O2 therapy    Interventions:   - follow POC, med regimen, IS  - See  additional Care Plan goals for specific interventions  Outcome: Adequate for Discharge     Problem: RESPIRATORY - ADULT  Goal: Achieves optimal ventilation and oxygenation  Description: INTERVENTIONS:  - Assess for changes in respiratory status  - Assess for changes in mentation and behavior  - Position to facilitate oxygenation and minimize respiratory effort  - Oxygen supplementation based on oxygen saturation or ABGs  - Provide Smoking Cessation handout, if applicable  - Encourage broncho-pulmonary hygiene including cough, deep breathe, Incentive Spirometry  - Assess the need for suctioning and perform as needed  - Assess and instruct to report SOB or any respiratory difficulty  - Respiratory Therapy support as indicated  - Manage/alleviate anxiety  - Monitor for signs/symptoms of CO2 retention  Outcome: Adequate for Discharge     Problem: SAFETY ADULT - FALL  Goal: Free from fall injury  Description: INTERVENTIONS:  - Assess pt frequently for physical needs  - Identify cognitive and physical deficits and behaviors that affect risk of falls.  - [place] fall precautions as indicated by assessment.  - Educate pt/family on patient safety including physical limitations  - Instruct pt to call for assistance with activity based on assessment  - Modify environment to reduce risk of injury  - Provide assistive devices as appropriate  - Consider OT/PT consult to assist with strengthening/mobility  - Encourage toileting schedule  Outcome: Adequate for Discharge     Problem: DISCHARGE PLANNING  Goal: Discharge to home or other facility with appropriate resources  Description: INTERVENTIONS:  - Identify barriers to discharge w/pt and caregiver  - Include patient/family/discharge partner in discharge planning  - Arrange for needed discharge resources and transportation as appropriate  - Identify discharge learning needs (meds, wound care, etc)  - Arrange for interpreters to assist at discharge as needed  - Consider  post-discharge preferences of patient/family/discharge partner  - Complete POLST form as appropriate  - Assess patient's ability to be responsible for managing their own health  - Refer to Case Management Department for coordinating discharge planning if the patient needs post-hospital services based on physician/LIP order or complex needs related to functional status, cognitive ability or social support system  Outcome: Adequate for Discharge

## 2024-10-19 NOTE — PROGRESS NOTES
[facility]  part of [facility]    Progress Note      Assessment and Plan:   1.  Acute on chronic respiratory failure-combined pulmonary fibrosis with emphysema and possible superimposed pneumonia in patient with pneumatoceles and bronchiectasis as well.  Altogether, doing much better.  Hopefully home within a day or so.    Recommendations:  1.  Ongoing antibiotic  2.  Patient follows with me in the outpatient setting and she already has an appointment in the near future.  She can keep that appointment  3.  If home tomorrow, would extend short course antibiotic and steroid.  4.  DuoNebs  5.  Would consider repeat CT scan of the chest at the 6-month interval for follow-up of the radiographic findings and ensure resolved.    2.  DVT prophylaxis-on Lovenox    3.  History of venous thromboembolism and IVC filter-having never tolerated anticoagulation due to GI bleed    Subjective:   Chester Bautista is a(n) 84 year old female who is breathing comfortably off oxygen    Objective:   Blood pressure 131/86, pulse 97, temperature 97.5 °F (36.4 °C), temperature source Oral, resp. rate 16, height 5' 1\" (1.549 m), weight 143 lb 9.6 oz (65.1 kg), SpO2 95%.    Physical Exam alert female  HEENT examination is unremarkable with pupils equal round and reactive to light and accommodation.   Neck without adenopathy, thyromegaly, JVD nor bruit.   Lungs diminished with a few scattered crackles to auscultation and percussion.  Cardiac regular rate and rhythm no murmur.   Abdomen nontender, without hepatosplenomegaly and no mass appreciable.   Extremities without clubbing cyanosis nor edema.   Neurologic grossly intact with symmetric tone and strength and reflex.  Skin without gross abnormality     Results:        Chapin Parker MD  Medical Director, Critical Care, [facility]  Medical Director, [facility]  Pager: [phone]

## 2024-10-19 NOTE — DISCHARGE INSTRUCTIONS
Sometimes managing your health at home requires assistance.  The [facility] team has recognized your preference to use [facility], Phone: [phone] or Fax: [phone]. A representative from the home health agency will contact you or your family to schedule your first visit.

## 2024-10-19 NOTE — CM/SW NOTE
10/19/24 1200   Discharge disposition   Expected discharge disposition Home-Health   Post Acute Care Provider   ([facility])   Discharge transportation Private car     Pt discussed during nursing rounds. Pt is stable for [place] today. MD [place] order entered. Pt will not require home O2 after being weaned off O2. Pt agreeable to HH at [place]. [facility] will provide RN and therapy services at [place], agency notified of [place] home today. Pt's daughter will provide transport at [place].    Plan: Home w/daughter with [facility] today.    / to remain available for support and/or discharge planning.     ARISTEO Cortez    [phone]

## 2024-10-19 NOTE — PLAN OF CARE
Pt A&Ox4. Right arm precautions. On 1L NC, RA baseline. PRN robitussin and tessalon given for cough. PRN labetalol for high bp.  Call light within reach. Frequent rounding by staff.  Problem: Patient Centered Care  Goal: Patient preferences are identified and integrated in the patient's plan of care  Description: Interventions:  - What would you like us to know as we care for you? From home with daughter  - Provide timely, complete, and accurate information to patient/family  - Incorporate patient and family knowledge, values, beliefs, and cultural backgrounds into the planning and delivery of care  - Encourage patient/family to participate in care and decision-making at the level they choose  - Honor patient and family perspectives and choices  Outcome: Progressing     Problem: Diabetes/Glucose Control  Goal: Glucose maintained within prescribed range  Description: INTERVENTIONS:  - Monitor Blood Glucose as ordered  - Assess for signs and symptoms of hyperglycemia and hypoglycemia  - Administer ordered medications to maintain glucose within target range  - Assess barriers to adequate nutritional intake and initiate nutrition consult as needed  - Instruct patient on self management of diabetes  Outcome: Progressing     Problem: Patient/Family Goals  Goal: Patient/Family Long Term Goal  Description: Patient's Long Term Goal: free from SOB     Interventions:  - follow POC, IS, med regimen  - See additional Care Plan goals for specific interventions  Outcome: Progressing  Goal: Patient/Family Short Term Goal  Description: Patient's Short Term Goal: free from O2 therapy    Interventions:   - follow POC, med regimen, IS  - See additional Care Plan goals for specific interventions  Outcome: Progressing     Problem: RESPIRATORY - ADULT  Goal: Achieves optimal ventilation and oxygenation  Description: INTERVENTIONS:  - Assess for changes in respiratory status  - Assess for changes in mentation and behavior  - Position to  facilitate oxygenation and minimize respiratory effort  - Oxygen supplementation based on oxygen saturation or ABGs  - Provide Smoking Cessation handout, if applicable  - Encourage broncho-pulmonary hygiene including cough, deep breathe, Incentive Spirometry  - Assess the need for suctioning and perform as needed  - Assess and instruct to report SOB or any respiratory difficulty  - Respiratory Therapy support as indicated  - Manage/alleviate anxiety  - Monitor for signs/symptoms of CO2 retention  Outcome: Progressing     Problem: SAFETY ADULT - FALL  Goal: Free from fall injury  Description: INTERVENTIONS:  - Assess pt frequently for physical needs  - Identify cognitive and physical deficits and behaviors that affect risk of falls.  - [place] fall precautions as indicated by assessment.  - Educate pt/family on patient safety including physical limitations  - Instruct pt to call for assistance with activity based on assessment  - Modify environment to reduce risk of injury  - Provide assistive devices as appropriate  - Consider OT/PT consult to assist with strengthening/mobility  - Encourage toileting schedule  Outcome: Progressing     Problem: DISCHARGE PLANNING  Goal: Discharge to home or other facility with appropriate resources  Description: INTERVENTIONS:  - Identify barriers to discharge w/pt and caregiver  - Include patient/family/discharge partner in discharge planning  - Arrange for needed discharge resources and transportation as appropriate  - Identify discharge learning needs (meds, wound care, etc)  - Arrange for interpreters to assist at discharge as needed  - Consider post-discharge preferences of patient/family/discharge partner  - Complete POLST form as appropriate  - Assess patient's ability to be responsible for managing their own health  - Refer to Case Management Department for coordinating discharge planning if the patient needs post-hospital services based on physician/LIP order or complex  needs related to functional status, cognitive ability or social support system  Outcome: Progressing

## 2024-10-21 ENCOUNTER — HOSPITAL ENCOUNTER (OUTPATIENT)
Dept: INTERVENTIONAL RADIOLOGY/VASCULAR | Facility: HOSPITAL | Age: 84
Discharge: HOME | End: 2024-10-21
Attending: RADIOLOGY | Admitting: RADIOLOGY
Payer: MEDICARE

## 2024-10-21 ENCOUNTER — PATIENT OUTREACH (OUTPATIENT)
Dept: CASE MANAGEMENT | Age: 84
End: 2024-10-21

## 2024-10-21 ENCOUNTER — TELEPHONE (OUTPATIENT)
Dept: ENDOCRINOLOGY CLINIC | Facility: CLINIC | Age: 84
End: 2024-10-21

## 2024-10-21 VITALS
SYSTOLIC BLOOD PRESSURE: 165 MMHG | BODY MASS INDEX: 27 KG/M2 | RESPIRATION RATE: 25 BRPM | HEIGHT: 61 IN | HEART RATE: 105 BPM | OXYGEN SATURATION: 93 % | TEMPERATURE: 98 F | DIASTOLIC BLOOD PRESSURE: 65 MMHG | WEIGHT: 143 LBS

## 2024-10-21 DIAGNOSIS — J44.1 COPD EXACERBATION (HCC): Primary | ICD-10-CM

## 2024-10-21 DIAGNOSIS — J18.9 PNEUMONIA OF BOTH LUNGS DUE TO INFECTIOUS ORGANISM, UNSPECIFIED PART OF LUNG: ICD-10-CM

## 2024-10-21 DIAGNOSIS — J96.01 ACUTE RESPIRATORY FAILURE WITH HYPOXIA (HCC): ICD-10-CM

## 2024-10-21 LAB — GLUCOSE BLDC GLUCOMTR-MCNC: 392 MG/DL (ref 70–99)

## 2024-10-21 PROCEDURE — 36582 REPLACE TUNNELED CV CATH: CPT | Performed by: RADIOLOGY

## 2024-10-21 PROCEDURE — 77001 FLUOROGUIDE FOR VEIN DEVICE: CPT | Performed by: RADIOLOGY

## 2024-10-21 PROCEDURE — 99152 MOD SED SAME PHYS/QHP 5/>YRS: CPT | Performed by: RADIOLOGY

## 2024-10-21 PROCEDURE — 99153 MOD SED SAME PHYS/QHP EA: CPT | Performed by: RADIOLOGY

## 2024-10-21 PROCEDURE — 0J2TXYZ CHANGE OTHER DEVICE IN TRUNK SUBCUTANEOUS TISSUE AND FASCIA, EXTERNAL APPROACH: ICD-10-PCS | Performed by: PREVENTIVE MEDICINE

## 2024-10-21 PROCEDURE — 1111F DSCHRG MED/CURRENT MED MERGE: CPT

## 2024-10-21 PROCEDURE — 82962 GLUCOSE BLOOD TEST: CPT

## 2024-10-21 RX ORDER — SODIUM CHLORIDE 9 MG/ML
INJECTION, SOLUTION INTRAVENOUS CONTINUOUS
Status: DISCONTINUED | OUTPATIENT
Start: 2024-10-21 | End: 2024-10-21

## 2024-10-21 RX ORDER — HYDRALAZINE HYDROCHLORIDE 20 MG/ML
15 INJECTION INTRAMUSCULAR; INTRAVENOUS ONCE
Status: COMPLETED | OUTPATIENT
Start: 2024-10-21 | End: 2024-10-21

## 2024-10-21 RX ORDER — MIDAZOLAM HYDROCHLORIDE 1 MG/ML
INJECTION INTRAMUSCULAR; INTRAVENOUS
Status: COMPLETED
Start: 2024-10-21 | End: 2024-10-21

## 2024-10-21 RX ORDER — GLIPIZIDE 5 MG/1
5 TABLET ORAL
Qty: 30 TABLET | Refills: 1 | Status: SHIPPED | OUTPATIENT
Start: 2024-10-21

## 2024-10-21 RX ORDER — LIDOCAINE HYDROCHLORIDE 20 MG/ML
INJECTION, SOLUTION INFILTRATION; PERINEURAL
Status: COMPLETED
Start: 2024-10-21 | End: 2024-10-21

## 2024-10-21 RX ORDER — HYDRALAZINE HYDROCHLORIDE 20 MG/ML
INJECTION INTRAMUSCULAR; INTRAVENOUS
Status: COMPLETED
Start: 2024-10-21 | End: 2024-10-21

## 2024-10-21 RX ADMIN — HYDRALAZINE HYDROCHLORIDE 15 MG: 20 INJECTION INTRAMUSCULAR; INTRAVENOUS at 15:57:00

## 2024-10-21 NOTE — PROGRESS NOTES
DM appointment request (discharged 10/19)    Vic KHAN  Nurse Practitioner, ENDOCRINOLOGY  [facility]   [address]   [address] [facility] [address]   [phone]  Apt made: Wed 11/13 @1:00pm - Sánchez ([address], [address])    Dr Heike Sorto  Internal Medicine  [facility]   [address] [address]   [phone]  Apt made:  Tue 10/22 @1:45pm w/Dr Alana Phillip ([address] [place] [address], 1st floor)  Confirmed w/pt daughter, Brea  Closing encounter

## 2024-10-21 NOTE — PROGRESS NOTES
DISCHARGE NOTE      Pt is able to sit up and ambulate without difficulty.   Pt voided and tolerated fluids and food.   Procedural site remains dry and intact with good circulation, motion, and sensation.   No signs and symptoms of bleeding/hematoma noted.   Instruction provided, patient/family verbalizes understanding.   Dr. Flanagan spoke with patient/family post procedure.      During the course of pt's recovery. BP was elevated, IV meds given and came down to 160's. Pt has no complaints but had been coughing constantly. Cough drops given by daughter.    Patient and family advised to visit ED if BP is persistently high and Oxygen sats drops to below 90%.    Pt is scheduled to see her primary tomorrow 10/22.

## 2024-10-21 NOTE — TELEPHONE ENCOUNTER
Glipizide 5 mg tablet   Take 1 tablet by mouth every morning before breakfast  Requesting 90 day supply

## 2024-10-21 NOTE — TELEPHONE ENCOUNTER
Endocrine Refill protocol for oral and injectable diabetic medications    Protocol Criteria:  FAILED  Reason: Elevated A1C 10.4  10/14/24    If all below requirements are met, send a 90-day supply with 1 refill per provider protocol.    Verify appointment with Endocrinology completed in the last 6 months or scheduled in the next 3 months.  Verify A1C has been completed within the last 6 months and is below 8.5%     Last completed office visit: 10/8/2024 Vic Thakkar APRN   Next scheduled Follow up: 11/13/24   Last A1c result: Last A1c value was 10.3% done 10/14/2024.

## 2024-10-21 NOTE — PROGRESS NOTES
This NCM was going to contact pt for TCM, but pt currently in IR/procedure. NCM to try again, at a later time.      Hospital Discharge Diagnoses:   COPD exacerbation  Community Acquired pneumonia  H/o Breast cancer       Follow-up Appointments:  This patient's most recent A1C was >= 8%; please consider scheduling a follow-up appointment for Diabetes  Last A1C Value: 10.3% Date: [10/14/2024]     Follow-up Information    Follow up With Specialties Details Why Contact Info   Vic Thakkar APRN Nurse Practitioner, ENDOCRINOLOGY Schedule an appointment as soon as possible for a visit Diabetes follow up within 1-3 weeks [address]  [facility] [address]  [phone]   Heike Sorto MD Internal Medicine Schedule an appointment as soon as possible for a visit in 1 week(s)  [address] [facility] [address]  [phone]         Your appointments       Date & Time Appointment Department (Center)    Oct 21, 2024 12:00 PM T INTERVENTIONAL SUITES - INTERVENTIONAL RADIOLOGY 60 with [facility] IVS RM4 [facility] Interventional Suites ([facility])    After you arrive, you will need to check-in at the desk in the waiting area, so we know you have arrived (even if you already completed eCheck-in online).    Your physician or physician's representative will follow-up with you and your loved one by phone after you are discharged.    A nurse from the Cath Lab/ Interventional Radiology department will be calling you 1-2 days prior to your procedure to perform a health history screening and give you specific instructions for your procedure.  The time we assign you to arrive will be approximately one hour prior to your procedure.  This will allow plenty of time to register and to get you prepared for your procedure.     If you have any questions, please call [phone].  We are available Monday through Friday 8:00 am to 4:30 pm.        Oct 21, 2024 12:00 PM T INTERVENTIONAL SUITES -  INTERVENTIONAL RADIOLOGY 60 with Andrea Spangler MD [facility] Interventional Suites ([facility])    After you arrive, you will need to check-in at the desk in the waiting area, so we know you have arrived (even if you already completed eCheck-in online).    Your physician or physician's representative will follow-up with you and your loved one by phone after you are discharged.    A nurse from the Cath Lab/ Interventional Radiology department will be calling you 1-2 days prior to your procedure to perform a health history screening and give you specific instructions for your procedure.  The time we assign you to arrive will be approximately one hour prior to your procedure.  This will allow plenty of time to register and to get you prepared for your procedure.     If you have any questions, please call [phone].  We are available Monday through Friday 8:00 am to 4:30 pm.        Oct 22, 2024 1:45 PM CDT Hospital Follow Up with Alana Flores MD [facility] ([facility])        Oct 24, 2024 2:30 PM CDT Infusion Visit with EM CC LAB2 Bridget Chen [facility] - Infusion ([facility])        Oct 31, 2024 1:30 PM CDT Follow Up Visit with Chapin Parker MD [facility] ([facility])        Oct 31, 2024 3:30 PM CDT Infusion Visit with EM CC LAB2 Bridget Chen [facility] - Infusion ([facility])        Nov 01, 2024 1:15 PM CDT Injection - Em Cc with EM CC LAB2 Bridget Chen [facility] - Infusion ([facility])        Nov 06, 2024 2:00 PM CST Follow Up Visit with Hannah Encinas MD [facility] ([facility])        Nov 13, 2024 1:00 PM CST Follow Up Visit with Vic Thakkar APRN [facility] ([facility])        Nov 20, 2024 12:40 PM CST  Follow Up Visit with Ashia Goodrcih MD [facility] ([facility])        Nov 21, 2024 2:30 PM CST HEMATOLOGY ONCOLOGY FOLLOW UP with Ladonna Luis APRN [facility] Hematology Oncology ([facility])        Nov 29, 2024 2:30 PM CST Injection - Em Cc with EM CC LAB1 Bridget JOHN [facility] - Infusion ([facility])        Jan 07, 2025 2:30 PM CST Follow Up Visit with Vic Thakkar APRN [facility] ([facility])        Mar 25, 2025 3:00 PM CDT Follow Up Visit with Chapin Parker MD [facility] ([facility])

## 2024-10-21 NOTE — DISCHARGE INSTRUCTIONS
INTERVENTIONAL RADIOLOGY  [facility]  [phone]     Patient Name:  Prudencio Bautista     Procedure:  PORT REMOVAL    Site Care: Skin glue will naturally flake off on its own. Do not peel.  Gently wash area with soap and water in 24 hours. Do not scrub over incision area.                                      Activity/Diet  No heavy lifting or strenuous activity for 48 hours.  Drink plenty of fluids, unless you have otherwise been told to restrict your fluid intake.  Do not drink alcohol for 24 hours.  Do not drive,  operate heavy machinery, make important decisions or sign legal documents today.  Do not submerge in bath tubs or pools for 1 week.    Medications:  Take acetaminophen if needed for pain. Do not exceed 4000mg of acetaminophen in a 24-hour period., Do not take blood thinners, aspirin, or Plavix for 24 hours., Make no changes to your existing medications. and Continue taking previous medication    Contact Interventional Radiology at [phone] if you have severe/unrelieved pain, fever, chills, dizziness/lightheadedness, or drainage/bleeding from your incision site.

## 2024-10-21 NOTE — PROCEDURES
[facility]  part of [facility]  Procedure Note    Chester Bautista Patient Status:  Outpatient    1940 MRN [id number]   Location [facility] INTERVENTIONAL SUITES Attending Bradley Boyle MD    Day # 0 PCP Heike Sorto MD     Procedure: Port removal, port placement    Pre-Procedure Diagnosis:  Port malfunction    Post-Procedure Diagnosis: Same    Anesthesia:  Sedation    Findings:  Port removal, port placement      Blood Loss:  minimal        Kai Flanagan MD  10/21/2024

## 2024-10-21 NOTE — TELEPHONE ENCOUNTER
Chhaya from  scheduling calling to schedule hospital follow up, patient is scheduled for 11/13/24 in [facility]. Asking if anything in [place] in afternoon. Please call.

## 2024-10-22 ENCOUNTER — HOSPITAL ENCOUNTER (EMERGENCY)
Facility: HOSPITAL | Age: 84
Discharge: HOME | End: 2024-10-22
Attending: EMERGENCY MEDICINE
Payer: MEDICARE

## 2024-10-22 ENCOUNTER — APPOINTMENT (OUTPATIENT)
Dept: GENERAL RADIOLOGY | Facility: HOSPITAL | Age: 84
End: 2024-10-22
Attending: EMERGENCY MEDICINE
Payer: MEDICARE

## 2024-10-22 ENCOUNTER — PATIENT OUTREACH (OUTPATIENT)
Dept: CASE MANAGEMENT | Age: 84
End: 2024-10-22

## 2024-10-22 VITALS
TEMPERATURE: 98 F | RESPIRATION RATE: 25 BRPM | HEIGHT: 61 IN | OXYGEN SATURATION: 92 % | WEIGHT: 147 LBS | HEART RATE: 106 BPM | BODY MASS INDEX: 27.75 KG/M2 | SYSTOLIC BLOOD PRESSURE: 173 MMHG | DIASTOLIC BLOOD PRESSURE: 89 MMHG

## 2024-10-22 DIAGNOSIS — J44.1 COPD EXACERBATION (HCC): Primary | ICD-10-CM

## 2024-10-22 LAB
BASOPHILS # BLD AUTO: 0.06 X10(3) UL (ref 0–0.2)
BASOPHILS NFR BLD AUTO: 0.4 %
DEPRECATED RDW RBC AUTO: 56 FL (ref 35.1–46.3)
EOSINOPHIL # BLD AUTO: 0.07 X10(3) UL (ref 0–0.7)
EOSINOPHIL NFR BLD AUTO: 0.5 %
ERYTHROCYTE [DISTWIDTH] IN BLOOD BY AUTOMATED COUNT: 18 % (ref 11–15)
HCT VFR BLD AUTO: 34.2 %
HGB BLD-MCNC: 10 G/DL
IMM GRANULOCYTES # BLD AUTO: 0.12 X10(3) UL (ref 0–1)
IMM GRANULOCYTES NFR BLD: 0.9 %
LYMPHOCYTES # BLD AUTO: 0.98 X10(3) UL (ref 1–4)
LYMPHOCYTES NFR BLD AUTO: 7 %
MCH RBC QN AUTO: 25.4 PG (ref 26–34)
MCHC RBC AUTO-ENTMCNC: 29.2 G/DL (ref 31–37)
MCV RBC AUTO: 87 FL
MONOCYTES # BLD AUTO: 0.82 X10(3) UL (ref 0.1–1)
MONOCYTES NFR BLD AUTO: 5.9 %
NEUTROPHILS # BLD AUTO: 11.89 X10 (3) UL (ref 1.5–7.7)
NEUTROPHILS # BLD AUTO: 11.89 X10(3) UL (ref 1.5–7.7)
NEUTROPHILS NFR BLD AUTO: 85.3 %
PLATELET # BLD AUTO: 296 10(3)UL (ref 150–450)
RBC # BLD AUTO: 3.93 X10(6)UL
WBC # BLD AUTO: 13.9 X10(3) UL (ref 4–11)

## 2024-10-22 PROCEDURE — 99285 EMERGENCY DEPT VISIT HI MDM: CPT

## 2024-10-22 PROCEDURE — 96374 THER/PROPH/DIAG INJ IV PUSH: CPT

## 2024-10-22 PROCEDURE — 93005 ELECTROCARDIOGRAM TRACING: CPT

## 2024-10-22 PROCEDURE — 94799 UNLISTED PULMONARY SVC/PX: CPT

## 2024-10-22 PROCEDURE — 85025 COMPLETE CBC W/AUTO DIFF WBC: CPT | Performed by: EMERGENCY MEDICINE

## 2024-10-22 PROCEDURE — 71045 X-RAY EXAM CHEST 1 VIEW: CPT | Performed by: EMERGENCY MEDICINE

## 2024-10-22 PROCEDURE — 93010 ELECTROCARDIOGRAM REPORT: CPT

## 2024-10-22 PROCEDURE — 94645 CONT INHLJ TX EACH ADDL HOUR: CPT

## 2024-10-22 PROCEDURE — 94644 CONT INHLJ TX 1ST HOUR: CPT

## 2024-10-22 RX ORDER — ALBUTEROL SULFATE 0.83 MG/ML
2.5 SOLUTION RESPIRATORY (INHALATION) EVERY 4 HOURS PRN
Qty: 30 EACH | Refills: 0 | Status: SHIPPED | OUTPATIENT
Start: 2024-10-22 | End: 2024-11-21

## 2024-10-22 RX ORDER — METHYLPREDNISOLONE SODIUM SUCCINATE 40 MG/ML
40 INJECTION, POWDER, LYOPHILIZED, FOR SOLUTION INTRAMUSCULAR; INTRAVENOUS ONCE
Status: COMPLETED | OUTPATIENT
Start: 2024-10-22 | End: 2024-10-22

## 2024-10-22 RX ORDER — ALBUTEROL SULFATE 90 UG/1
INHALANT RESPIRATORY (INHALATION)
Qty: 6.7 EACH | Refills: 5 | Status: SHIPPED | OUTPATIENT
Start: 2024-10-22

## 2024-10-22 RX ORDER — ALBUTEROL SULFATE 0.83 MG/ML
5 SOLUTION RESPIRATORY (INHALATION) ONCE
Status: COMPLETED | OUTPATIENT
Start: 2024-10-22 | End: 2024-10-22

## 2024-10-22 RX ORDER — DOXYCYCLINE 100 MG/1
100 CAPSULE ORAL 2 TIMES DAILY
Qty: 14 CAPSULE | Refills: 0 | Status: SHIPPED | OUTPATIENT
Start: 2024-10-22 | End: 2024-10-29

## 2024-10-22 NOTE — TELEPHONE ENCOUNTER
Dr. Parker- please sign pended order for Albuterol 108, if agreeable    Last office visit: 4/11/2024 Follow up: 10/31/2024 Last refill: 9/19/2023

## 2024-10-22 NOTE — ED INITIAL ASSESSMENT (HPI)
To ED with c/o low O2 sats since yesterday. Patient states she had a port removed and replaced with a new one yesterday. Patient states she was admitted last Thursday for shortness of breath.    Patient states O2 sats at home was around 86-91% on RA. 91-94% on RA in triage

## 2024-10-22 NOTE — TELEPHONE ENCOUNTER
Unfortunately I do not have any openings for a sooner apt. Completely booked.     Please obtain BG readings over the phone and also confirm prednisone dose/frequency and how long she is suppose to take. Confirm that this is new for her?    Please also schedule an apt with TOBYES in 1 week to review glucose patterns.       Thank you!    - - -

## 2024-10-22 NOTE — ED PROVIDER NOTES
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  Chronic cough noted in setting of patient being recently admitted for possible pneumonia with patient discharged several days ago.  No chest pain.  No leg swelling.  No fevers or chills.    Past Medical History:    Anxiety state    Cancer (HCC)    breast cancer 2018    Chronic obstructive pulmonary disease, unspecified (HCC)    Chronic obstructive pulmonary disease, unspecified (HCC)    COPD (chronic obstructive pulmonary disease) (HCC)    Diabetes (HCC)    Diabetes mellitus (HCC)    Essential hypertension    Exposure to medical diagnostic radiation    High blood pressure    High cholesterol    History of blood transfusion    low hemoglobin    Osteoarthritis    PONV (postoperative nausea and vomiting)    Pulmonary embolism (HCC)    Pulmonary emphysema (HCC)    Rheumatoid arthritis (HCC)       Past Surgical History:   Procedure Laterality Date    Cataract extraction w/  intraocular lens implant Bilateral 2017    Dr in [facility]     Colonoscopy      Colonoscopy N/A 07/21/2022    Procedure: COLONOSCOPY;  Surgeon: Mikey Garcia MD;  Location: [facility] ENDOSCOPY    Colonoscopy N/A 06/15/2023    Procedure: COLONOSCOPY;  Surgeon: Mikey Garcia MD;  Location: [facility] ENDOSCOPY    Correct bunion,simple      right foot  1993    Egd  12/21/2023    Dr. Garcia; Duodenal AVM's x4 cauterized    Hysterectomy      1972, no abnormal Paps, due to heavy bleeding with fibroids.  Not sure if it had bilateral salpingo-oophorectomy.    Ir aneurysm repairm  2010    Computer assisted  volumetric craniofomy with clipping of anterior cerebral artery.    Lumpectomy right      Radiation right      Rotator cuff repair          Total knee replacement Right 2010    Total knee replacement Left 2015    Transoral incisionless fundoplication - internal  2012 Fredy fundoplication at [facility] Dr. Fournier.    Fredy fundoplication at [facility] Dr. Fournier.    Trigger finger release      left hand      Yag capsulotomy - ou - both eyes Bilateral 2021    done in [place]            Family History   Problem Relation Age of Onset    Heart Surgery Mother         Leaky valve at 39 y/o.     Prostate Cancer Brother     Cancer Brother     Diabetes Maternal Grandmother     Diabetes Paternal Aunt     Breast Cancer Self 78    Breast Cancer Niece         before 50    Macular degeneration Neg     Glaucoma Neg        Social History     Socioeconomic History    Marital status:    Tobacco Use    Smoking status: Former     Current packs/day: 0.00     Types: Cigarettes     Quit date:      Years since quittin.8     Passive exposure: Never    Smokeless tobacco: Never    Tobacco comments:     [id number]   Vaping Use    Vaping status: Never Used   Substance and Sexual Activity    Alcohol use: Never    Drug use: Never   Social History Narrative    Just moved in from [place].   passed away and that is why moved from [place] to be with daughter who lives in [place].  Currently lives with daughter.     Social Drivers of Health     Financial Resource Strain: Low Risk  (10/22/2024)    Financial Resource Strain     Difficulty of Paying Living Expenses: Not very hard     Med Affordability: No   Food Insecurity: No Food Insecurity (10/17/2024)    Food Insecurity     Food Insecurity: Never true   Transportation Needs: No Transportation Needs (10/22/2024)    Transportation Needs     Lack of Transportation: No   Housing Stability: Low Risk  (10/17/2024)     Housing Stability     Housing Instability: No       Review of Systems :  Constitutional: As per HPI  Respiratory: Positive cough.    Positive for stated complaint: SOB  Other systems are as noted in HPI.  Constitutional and vital signs reviewed.      All other systems reviewed and negative except as noted above.    PSFH elements reviewed from today and agreed except as otherwise stated in HPI.    Physical Exam     ED Triage Vitals   BP 10/22/24 1234 (!) 169/94   Pulse 10/22/24 1234 114   Resp 10/22/24 1234 20   Temp 10/22/24 1234 98 °F (36.7 °C)   Temp src 10/22/24 1234 Temporal   SpO2 10/22/24 1237 93 %   O2 Device --        Current:BP (!) 169/94   Pulse 114   Temp 98 °F (36.7 °C) (Temporal)   Resp 20   Ht 154.9 cm (5' 1\")   Wt 66.7 kg   SpO2 93%   BMI 27.78 kg/m²         Physical Exam   Constitutional: No distress.   HEENT: MMM.  Head: Normocephalic.   Eyes: No injection.   Cardiovascular: Tachycardic.  Pulmonary/Chest: Mildly tachypneic with expiratory wheezing and rhonchi.  Right anterior chest wall without cellulitic or crepitant/fluctuant change.  Abdominal: Soft.  Nontender.  Musculoskeletal: No gross deformity.  No leg swelling.  Neurological: Alert.   Skin: Skin is warm.   Psychiatric: Cooperative.  Nursing note and vitals reviewed.        ED Course     Labs Reviewed   CBC WITH DIFFERENTIAL WITH PLATELET - Abnormal; Notable for the following components:       Result Value    WBC 13.9 (*)     HGB 10.0 (*)     HCT 34.2 (*)     MCH 25.4 (*)     MCHC 29.2 (*)     RDW-SD 56.0 (*)     RDW 18.0 (*)     Neutrophil Absolute Prelim 11.89 (*)     Neutrophil Absolute 11.89 (*)     Lymphocyte Absolute 0.98 (*)     All other components within normal limits     EKG    Rate, intervals and axes as noted on EKG Report.  Rate: 111  Rhythm: Sinus Rhythm  Reading: Sinus tach 111 without ST elevation as independently interpreted by myself           XR CHEST AP PORTABLE  (CPT=71045)    Result Date: 10/22/2024          PROCEDURE: XR CHEST AP PORTABLE  (CPT=71045) TIME: 1321.   COMPARISON: [facility], CT CHEST PE AORTA (IV ONLY) (CPT=71260), 10/17/2024, 5:39 PM.  [facility], XR CHEST AP PORTABLE (CPT=71045), 10/17/2024, 2:36 PM.  [facility], XR CHEST PA + LAT CHEST (WBF=57077), 3/06/2024, 3:59 PM.  [facility], XR CHEST AP PORTABLE (CPT=71045), 2/23/2024, 1:31 PM.  [facility], XR CHEST AP PORTABLE (CPT=71045), 2/03/2024, 2:28 PM.  INDICATIONS: Decreasing O2. Cough.  TECHNIQUE:   Single view.   FINDINGS: Lung volumes are diminished.  There is redemonstration of increased interstitial markings throughout both lungs.  The findings are most compatible with interstitial lung disease/pulmonary fibrosis.  Please note that an underlying subtle infiltrate cannot be entirely excluded.  There are no pleural effusions and no pneumothorax.  The heart and mediastinal structures are minimally prominent.  Pulmonary vascularity is within normal limits.  There is a right IJ Port-A-Cath in expected position.  Impression:  Increased interstitial markings throughout both lungs.  The findings are most compatible with pulmonary fibrosis and have not significantly changed when compared to the previous exam.  Please note that an underlying subtle infiltrate cannot be entirely excluded.     Dictated by (CST): Parker Keane MD on 10/22/2024 at 1:39 PM     Finalized by (CST): Parker Keane MD on 10/22/2024 at 1:43 PM          CT CHEST PE AORTA (IV ONLY) (CPT=71260)    Result Date: 10/17/2024  PROCEDURE: CT CHEST PE AORTA (IV ONLY) (CPT=71260)  COMPARISON: [facility], XR CHEST AP PORTABLE (CPT=71045), 10/17/2024, 2:36 PM.  [facility], CT CHEST(CONTRAST ONLY) (CPT=71260), 4/26/2024, 11:31 AM.  INDICATIONS: Cough x3 weeks. Hx of COPD.  TECHNIQUE: CT images of the chest were obtained with non-ionic intravenous contrast material.  Automated  exposure control for dose reduction was used. Adjustment of the mA and/or kV was done based on the patient's size. Use of iterative reconstruction technique for dose reduction was used. Dose information is transmitted to the ACR (American College of Radiology) NRDR (National Radiology Data Registry) which includes the Dose Index Registry.  FINDINGS:  CARDIAC: Mild coronary artery calcification.  Mild mitral annular calcification. MEDIASTINUM/DLYON: Mild lower right paratracheal lymph node enlargement-10 cm short axis and lower left paratracheal lymph node enlargement -11 mm. PULMONARY ARTERIES: Stable enlargement of main pulmonary artery-3.2 cm.  No pulmonary embolus through proximal subsegmental level.  AORTA: Irregular atherosclerotic plaque in the aorta.  Aortic root measures 4 cm. No aneurysm or dissection. LUNGS/PLEURA: Emphysema with coexistent fibrotic changes in the periphery of the mid and upper lungs.  Some new patchy ground-glass attenuation opacities including a 2.6 cm region in the posterior right upper lobe.  More dense confluent opacity anterior right upper lobe and right middle lobe associated with multiple peripheral gas-filled cyst-like foci and slightly more confluent lingular opacities.  A chronic elongated nodular bandlike region of scarring in the right middle lobe. CHEST WALL: Right Port-A-Cath is looped back on itself in the SVC.  Postsurgical changes in the right breast. LIMITED ABDOMEN: Small hiatal hernia.  A 3.9 cm simple left renal cyst.  No suspect abnormality. BONES: Normal.  No suspect bone lesion or fracture.  OTHER: Negative.           CONCLUSION:  1. No pulmonary embolus. 2. Emphysema with fibrotic changes in periphery of mid and upper lungs.  More dense confluent opacities in the right upper lobe and right middle lobe and to a lesser extent lingula may be related to acute pneumonia or more pronounced fibrosis.  2.6 cm ground-glass opacity in the posterior right upper lobe may  represent pneumonia or a new area of fibrosis. 3. Mild nonspecific paratracheal lymph node enlargement. 4. Tip of Port-A-Cath is looped back on itself in the SVC.     Dictated by (CST): Michael Gonzalez MD on 10/17/2024 at 5:58 PM     Finalized by (CST): Michael Gonzalez MD on 10/17/2024 at 6:12 PM          XR CHEST AP PORTABLE  (CPT=71045)    Result Date: 10/17/2024  PROCEDURE: XR CHEST AP PORTABLE  (CPT=71045) TIME: 1436  COMPARISON: [facility], XR CHEST PA + LAT CHEST (KDQ=[id number]), 3/06/2024, 3:59 PM.  [facility], CT CHEST(CONTRAST ONLY) (CPT=71260), 4/26/2024, 11:31 AM.  [facility], XR CHEST AP PORTABLE (CPT=71045), 2/23/2024, 1:31 PM.  INDICATIONS: Generalized weakness and hypoxia. History of COPD.  TECHNIQUE:   Single view.   Impression:  Stable heart size.  The tip of the port a catheter remains malpositioned in the azygos vein.  Normal vascularity  Stable low lung volumes.  Mild ill-defined opacities throughout the bilateral perihilar and mid lungs have increased from March 6, 2024  No pneumothorax or large effusion Finalized by (CST): Aelc Davis MD on 10/17/2024 at 3:07 PM           ED Course as of 10/22/24 1935  ------------------------------------------------------------  Time: 10/22 1502  Comment: Resting comfortably, clinically improved with wheezing noted - will initiate additional nebs/reassess.  ------------------------------------------------------------  Time: 10/22 1639  Comment: Resting with ongoing symptomatic improvement - will discahrge with neb machine/solution with steroids given in ED.       MDM   DIFFERENTIAL DIAGNOSIS: After history and physical exam differential diagnosis includes but is not limited to pneumothorax, pulmonary contusion, COPD exacerbation.    Pulse ox: 93%:Normal on RA, as independently interpreted by myself    Cardiac Monitor Interpretation:   Pulse Readings from Last 1 Encounters:   10/22/24 114   , sinus,    Medical  Decision Making  Evaluation for reported hypoxia in setting of recent admission for pneumonia on antibiotics and with history of pulmonary embolism off anticoagulation without associated chest pain or hemoptysis.  Wheezing noted with clinical and symptomatic improvement after neb/steroids, will discharge with symptomatic care and warning instructions for emergent return    Problems Addressed:  COPD exacerbation (HCC): acute illness or injury    Amount and/or Complexity of Data Reviewed  External Data Reviewed: labs, radiology, ECG and notes.     Details: 10/17/2024 ED note/labs/EKG/CT chest reviewed  Labs: ordered. Decision-making details documented in ED Course.  Radiology: ordered and independent interpretation performed. Decision-making details documented in ED Course.     Details: CXR without obvious pneumothorax as independently interpreted by myself  ECG/medicine tests: ordered and independent interpretation performed. Decision-making details documented in ED Course.    Risk  Prescription drug management.    Critical Care  Total time providing critical care: 31 minutes      A total of 31 minutes of critical care time (exclusive of billable procedures) was administered to manage the patient's respiratory instability due to her COPD exacerbation.  This involved direct patient intervention, complex decision making, and/or extensive discussions with the patient, family, and clinical staff.      I was wearing at minimum a facemask and eye protection throughout this encounter with handwashing performed prior and after patient evaluation without personal hand/facial/oropharyngeal contact and gloves worn throughout encounter. See note and/or contact this provider for further PPE details.      Disposition and Plan     Clinical Impression:  1. COPD exacerbation (HCC)        Disposition:  Discharge    Follow-up:  Heike Sorto MD  [facility] [address]  [phone]    Call  For followup and  re-evaluation.      Medications Prescribed:  Discharge Medication List as of 10/22/2024  5:22 PM        START taking these medications    Details   doxycycline 100 MG Oral Cap Take 1 capsule (100 mg total) by mouth 2 (two) times daily for 7 days., Normal, Disp-14 capsule, R-0      albuterol (2.5 MG/3ML) 0.083% Inhalation Nebu Soln Take 3 mL (2.5 mg total) by nebulization every 4 (four) hours as needed for Wheezing or Shortness of Breath., Normal, Disp-30 each, R-0

## 2024-10-22 NOTE — PROGRESS NOTES
Hospital follow up.    Last A1C Value: 10.3% Date: [10/14/2024]  Wednesday 11/13 @1  Existing appointment, on wait list.  Tuesday 10/22 @1:45  Existing appointment with PCP.    SCC (Pneumonia) request.    Patient is returning to the ED.  Confirmed with patient's daughter.    Closing encounter.

## 2024-10-22 NOTE — PROGRESS NOTES
Transitions of Care Navigation  Discharge Date: 10/19/24  Contact Date: 10/22/2024      Hospital Discharge Diagnoses:   COPD exacerbation  Community Acquired pneumonia  H/o Breast cancer     Transitions of Care Assessment:  DESI Initial Assessment    General:  Assessment completed with: Children (dtr Brea)  Patient Subjective: Dtcleo Guidry reports pt feeling better, since hospital discharge--still sleeping, this morning. Pt tolerating Doxycycline, as prescribed, appetite good, staying hydrated, independent with ADLs, using IS, as directed. Port-a-cath incision healing, without pain, redness, drainage or bleeding. Dtr denies pt with any fever, chills, shaking, productive cough, headache, vision changes, dizziness, nausea, vomiting, diarrhea, bleeding, irregular heartbeat or fast pulse, loss of vision, speech or strength or coordination in any body part, chest pain or shortness of breath at this time. O2 sat running in low 90s on room air--dtr unable to relay specific values, during call.  Chief Complaint: Hospital Discharge Diagnoses:   COPD exacerbation  Community Acquired pneumonia  H/o Breast cancer  Verify patient name and  with patient/ caregiver: Yes    Hospital Stay/Discharge:  Tell me what you understand of why you were in the hospital or emergency department: Patient presents to ER for concerns of low O2 per, daughter, patients O2 sats have been fluctuating and dropping into the 80s.   Patient has had a cough x 3 weeks, patient does have COPD.   O2 sats 88-92%  in triage.  Prior to leaving the hospital were your Discharge Instructions reviewed with you?: Yes  Did you receive a copy of your written Discharge Instructions?: Yes  What questions do you have about your Discharge Instructions?: none  Do you feel better or worse since you left the hospital or emergency department?: Better    Follow - Up Appointment:  Do you have a follow-up appointment?: Yes  Date: 10/22/24  Physician: Dr. Warner  Sandra  Are there any barriers to getting to your follow-up appointment?: No    Home Health/DME:  Prior to leaving the hospital was Home Health (HH) arranged for you?: Yes  Has HH been out or set up a visit to see you?: Visit Scheduled ([facility], Phone: [phone] or Fax: [phone])  Date: 10/23/24     Prior to leaving the hospital or emergency department was Durable Medical Equipment (DME), medical supplies, or infusions arranged for you?: Yes (IS)  Have you received your DME/supplies/infusions?: Yes  Do you have questions about using your DME/supplies/infusions?: No     Medications/Diet:  Did any of your medications change, during or after your hospital stay or ED visit?: Yes  Do you have your new or updated medications?: Yes  Do you understand what your medications are for and possible side effects?: Yes  Are there any reasons that keep you from taking your medication as prescribed?: No  Any concerns about medication refills?: No    Were you given a different diet per your Discharge Instructions?: No  Reason: not required     Questions/Concerns:  Do you have any questions or concerns that have not been discussed?: No     DESI Follow-up Assessment    General:  Assessment completed with: Children (lalita Guidry)  Patient Subjective: Lalita Guidry reports pt feeling better, since hospital discharge--still sleeping, this morning. Pt tolerating Doxycycline, as prescribed, appetite good, staying hydrated, independent with ADLs, using IS, as directed. Port-a-cath incision healing, without pain, redness, drainage or bleeding. Dtr denies pt with any fever, chills, shaking, productive cough, headache, vision changes, dizziness, nausea, vomiting, diarrhea, bleeding, irregular heartbeat or fast pulse, loss of vision, speech or strength or coordination in any body part, chest pain or shortness of breath at this time. O2 sat running in low 90s on room air--dtr unable to relay specific  values, during call.  Chief Complaint: Hospital Discharge Diagnoses:   COPD exacerbation  Community Acquired pneumonia  H/o Breast cancer  Community Resources: Home Health    Progress/Care Plan:  Is the patient progressing as planned?: Yes  Frequency/Follow Up Plan: 7 day follow up, next week       Nursing Interventions: Discussed diet, activity, medications and need for f/u visits. [facility] RN and PT scheduled to see pt tomorrow, for start of care. Dtr Brea confirms today's TCM/DESI appt with Dr. Flores at HND office, as well as 10/31/24 appt with Dr. Parker and 11/13/24 endocrinology appt, as scheduled. Dtr aware when to contact PCP/specialists and when to seek emergency care. No further questions/concerns at this time.    Medications:  Current Outpatient Medications   Medication Sig Dispense Refill    glipiZIDE 5 MG Oral Tab Take 1 tablet (5 mg total) by mouth every morning before breakfast. 30 tablet 1    Doxycycline Monohydrate 100 MG Oral Cap Take 1 capsule (100 mg total) by mouth 2 (two) times daily. 20 capsule 0    Potassium Chloride ER 20 MEQ Oral Tab CR Take 40 mEq by mouth every morning. 60 tablet 1    loratadine 10 MG Oral Tab TAKE 1 TABLET BY MOUTH EVERY DAY 30 tablet 0    cyclobenzaprine 10 MG Oral Tab Take 1 tablet (10 mg total) by mouth nightly as needed. 90 tablet 1    predniSONE 5 MG Oral Tab Take 1 tablet (5 mg total) by mouth daily. 90 tablet 0    hydroxychloroquine 200 MG Oral Tab Take 1 tablet (200 mg total) by mouth daily. 90 tablet 0    BD PEN NEEDLE KADEN 2ND GEN 32G X 4 MM Does not apply Misc 1 EACH DAILY. USE DAILY WITH INSULIN 100 each 1    insulin glargine (LANTUS SOLOSTAR) 100 UNIT/ML Subcutaneous Solution Pen-injector Inject 10 Units into the skin daily with dinner. 3 mL 1    famotidine 40 MG Oral Tab Take 1 tablet (40 mg total) by mouth 2 (two) times daily.      montelukast 10 MG Oral Tab Take 1 tablet (10 mg total) by mouth nightly. 90 tablet 3    valsartan 80 MG Oral Tab Take  1 tablet (80 mg total) by mouth daily. 90 tablet 3    carvedilol 12.5 MG Oral Tab Take 1 tablet (12.5 mg total) by mouth 2 (two) times daily with meals. 180 tablet 3    fluticasone-salmeterol (WIXELA INHUB) 250-50 MCG/ACT Inhalation Aerosol Powder, Breath Activated Inhale 1 puff into the lungs every 12 (twelve) hours. 1 each 5    Lancets Does not apply Misc Dx. E11.9. Checks qam. 50 each 6    Blood Glucose Monitoring Suppl (BLOOD GLUCOSE SYSTEM GILLES) Does not apply Kit Dx. E11.9. Checks qam. 1 kit 0    Blood Glucose Monitoring Suppl w/Device Does not apply Kit Dx. E11.9. Checks qam. 1 kit 0    Blood Gluc Meter Disp-Strips Does not apply Device Dx. E11.9. Checks qam. 100 each 0    gabapentin 100 MG Oral Cap Take 1 capsule (100 mg total) by mouth nightly. 90 capsule 3    Accu-Chek FastClix Lancets Does not apply Misc Check sugar once daily as directed (E11.42) 100 each 2    rosuvastatin (CRESTOR) 20 MG Oral Tab Take 1 tablet (20 mg total) by mouth nightly. 90 tablet 0    glycopyrrolate 1 MG Oral Tab Take 1 tablet (1 mg total) by mouth 3 (three) times daily. 90 tablet 1    albuterol 108 (90 Base) MCG/ACT Inhalation Aero Soln inhale 2 puff by inhalation route  every 4 - 6 hours as needed 1 each 0    albuterol 108 (90 Base) MCG/ACT Inhalation Aero Soln inhale 2 puff by inhalation route  every 4 - 6 hours as needed 1 each 5    Blood Glucose Monitoring Suppl (ONETOUCH VERIO) w/Device Does not apply Kit 1 each daily. Test 1x daily 1 kit 0    OneTouch Delica Lancets 33G Does not apply Misc 4 x daily 100 each 1    Blood Glucose Monitoring Suppl (BLOOD GLUCOSE SYSTEM GILLES) Does not apply Kit DX E 11.9.  Checks every morning. 1 kit 0    Lancets Does not apply Misc DX E 11.9.  Checks every morning. 50 each 11    Blood Glucose Monitoring Suppl w/Device Does not apply Kit DX E 11.9.  Checks every morning. 1 kit 0    acetaminophen 500 MG Oral Tab Take 1 tablet (500 mg total) by mouth daily.       START taking:   Doxycycline  Monohydrate (MONODOX)      STOP taking: Accu-Chek Guide Strp Gvoke HypoPen 2-Pack 1 MG/0.2ML injection (glucagon) midodrine 2.5 MG Tabs (ProAmatine) OneTouch Verio Strp tamsulosin 0.4 MG Caps (Flomax) torsemide 20 MG Tabs (Demadex)    Diagnosis specifics:  COPD: Have you participated in a pulmonary rehab program? no   Would you like more information? no  We reviewed your medications/inhalers, how often are you using your inhalers? As prescribed  Are you currently on oxygen? no   Are you familiar with the signs and symptoms or worsening COPD? Yes  Who do you call with worsening COPD symptoms? Dr. Parker  Do you know when to call with COPD symptoms? Yes  Do you have any of the following potential risk factors for COPD in your home environment?   Primary or secondary tobacco smoke? no   Occupational dusts and chemicals organic or inorganic? no   Indoor air pollution from heating and cooking with poor ventilation? no   Mold? no   Pets? no   Extreme heat? no   Other: n/a  Pneumonia: Tell me what you understand about the signs and symptoms of pneumonia reoccurrence?  (Guide to discuss: fever, chills, shaking, chest pain, productive cough, difficulty breathing)  Comments: dtr denies any of the above symptoms--will monitor and return to ER with pt, if suddenly worsens    Follow-up Appointments:    [facility], Phone: [phone] or Fax: [phone]    This patient's most recent A1C was >= 8%; please consider scheduling a follow-up appointment for Diabetes  Last A1C Value: 10.3% Date: [10/14/2024]     Follow-up Information     Follow up With Specialties Details Why Contact Info   Vic Thakkar APRN Nurse Practitioner, ENDOCRINOLOGY Schedule an appointment as soon as possible for a visit Diabetes follow up within 1-3 weeks [address] [facility] [address]  [facility] [address]  [phone]   Heike Sorto MD Internal Medicine Schedule an appointment as soon as possible for a visit in 1 week(s)    [address] [facility] [address]  [phone]     Your appointments       Date & Time Appointment Department ([place])    Oct 22, 2024 1:45 PM CDT Hospital Follow Up with Alana Flores MD [facility]        Oct 24, 2024 2:30 PM CDT Infusion Visit with EM CC LAB2 Bridget Chen [facility] - Infusion ([facility])        Oct 31, 2024 1:30 PM CDT Follow Up Visit with Chapin Parker MD [facility] ([facility])        Oct 31, 2024 3:30 PM CDT Infusion Visit with EM CC LAB2 Bridget Chen [facility] - Infusion ([facility])        Nov 01, 2024 1:15 PM CDT Injection - Em Cc with EM CC LAB2 Bridget LOPEZ Chen [facility] ([facility])        Nov 06, 2024 2:00 PM CST Follow Up Visit with Hannah Encinas MD [facility] ([facility])        Nov 13, 2024 1:00 PM CST Follow Up Visit with Vic Thakkar APRN [facility] ([facility])        Nov 20, 2024 12:40 PM CST Follow Up Visit with Ashia Goodrich MD [facility] ([facility]        Nov 21, 2024 2:30 PM CST HEMATOLOGY ONCOLOGY FOLLOW UP with Ladonna Luis APRAMADEO [facility] Hematology Oncology ([facility])        Nov 29, 2024 2:30 PM CST Injection - Em Cc with EM CC LAB1 Bridget Chen [facility] ([facility])        Jan 07, 2025 2:30 PM CST Follow Up Visit with Vic Thakkar APRN [facility] ([facility])        Mar 25, 2025 3:00 PM CDT Follow Up Visit with Chapin Parker MD [facility], [facility] ( [facility]              [facility] Hematology Oncology  [facility]  [address]  [place] [address]  [phone] [facility], [facility] [place]   [facility]  [address]  [place] [address]  [phone] [facility] [place]  [facility]  [address]  [place] [address]  [phone]    [facility], [facility] [place]   [facility]  [address]  [place] [address]  [phone] [facility], [facility]  [address] [facility] [facility] [address]  [facility] [address]  [phone] [facility], [facility]  [address] [facility] [address]  [phone]    [facility]  [address] [facility] [id number] [phone] Bridget LOPEZ [facility]  [address]  [place] [address]  [phone]            [facility]  Was TCC Ordered: No    Primary Care Provider (If no TCC appointment)  Does patient already have a PCP appointment scheduled? Yes  Nurse Care Manager Confirmed PCP office TCM/DESI appointment with patient for today with Dr. Flores     Specialist  Does the patient have any other follow-up appointment(s) need to be scheduled? No    [x]  Patient verbally agrees to additional follow-up calls from Nurse Care Manager    Book By Date: 10/26/2024

## 2024-10-23 ENCOUNTER — PATIENT OUTREACH (OUTPATIENT)
Dept: CASE MANAGEMENT | Age: 84
End: 2024-10-23

## 2024-10-23 ENCOUNTER — TELEPHONE (OUTPATIENT)
Dept: ADMINISTRATIVE | Age: 84
End: 2024-10-23

## 2024-10-23 ENCOUNTER — TELEPHONE (OUTPATIENT)
Dept: INTERNAL MEDICINE CLINIC | Facility: CLINIC | Age: 84
End: 2024-10-23

## 2024-10-23 DIAGNOSIS — D50.9 IRON DEFICIENCY ANEMIA, UNSPECIFIED IRON DEFICIENCY ANEMIA TYPE: Primary | ICD-10-CM

## 2024-10-23 LAB
ATRIAL RATE: 111 BPM
P AXIS: 39 DEGREES
P-R INTERVAL: 130 MS
Q-T INTERVAL: 320 MS
QRS DURATION: 64 MS
QTC CALCULATION (BEZET): 435 MS
R AXIS: -4 DEGREES
T AXIS: 27 DEGREES
VENTRICULAR RATE: 111 BPM

## 2024-10-23 RX ORDER — SODIUM CHLORIDE 9 MG/ML
10 INJECTION, SOLUTION INTRAMUSCULAR; INTRAVENOUS; SUBCUTANEOUS ONCE
Status: CANCELLED | OUTPATIENT
Start: 2024-10-23

## 2024-10-23 RX ORDER — SODIUM CHLORIDE 9 MG/ML
10 INJECTION, SOLUTION INTRAMUSCULAR; INTRAVENOUS; SUBCUTANEOUS ONCE
OUTPATIENT
Start: 2024-10-23

## 2024-10-23 NOTE — TELEPHONE ENCOUNTER
Spoke with dtcleo Guidry for 10/22/24 ER visit follow up. Dtr did cancel TCM/DESI appt with Dr. Alana Flores (from 10/19/24 hospital discharge), yesterday, as she brought pt back to ER.      Offered to reschedule TCM/DESI with Dr. Alana Flores--dtr requesting TCM/DESI appt with Dr. Sorto, if possible--this NCM unable to book within recommended DESI appt timeframe, d/t schedule restrictions.    TCM/DESI appointment needed by 10/26/24.      BOOK BY DATE: 11/02/24    Please discuss with Dr. Sorto if pt can be added to her schedule at either office and follow-up with dtr, accordingly.     If PCP unable to add, she is agreeable to re-scheduling TCM/DESI appt with Dr. Alana Flores, but requesting message to be sent to office, first. Thank you!             Disposition and Plan      Clinical Impression:  1. COPD exacerbation (HCC)          Disposition:  Discharge     Follow-up:  Heike Sorto MD  [address] [place] [address]  [phone]     Call  For followup and re-evaluation.

## 2024-10-23 NOTE — TELEPHONE ENCOUNTER
Spoke to Brea and she agrees to come to the appointment at 130 pm and leave at 215 pm (will have 45 min appointment with CDE). She should have enough time to get to the infusion center at 230 pm.

## 2024-10-23 NOTE — PAYOR COMM NOTE
--------------  ADMISSION REVIEW     Payor: TRINO ARREGUIN [facility]  Subscriber #:  [id number]  Authorization Number: [id number]    Admit date: 10/17/24  Admit time:  8:01 PM       REVIEW DOCUMENTATION:  ED Provider Notes signed by Rikki Carvajal MD at 10/17/2024  6:53 PM    Patient Seen in: [facility] Emergency Department    History     Chief Complaint   Patient presents with    Cough/URI     Stated Complaint: copd low o2  84-year-old female with complicated history including PE, GI bleed with AVMs on monthly octreotide injections, prior IVC filter since removed, diabetes, COPD not on home oxygen presents with low oxygen, cough, fatigue.  Patient reports 3 weeks of cough, fatigue, noticed this morning her oxygen was low.  Denies leg swelling.  Denies melena.    Objective:     Past Medical History:    Anxiety state    Cancer (HCC)    breast cancer 2018    Chronic obstructive pulmonary disease, unspecified (HCC)    Chronic obstructive pulmonary disease, unspecified (HCC)    COPD (chronic obstructive pulmonary disease) (HCC)    Diabetes (HCC)    Diabetes mellitus (HCC)    Essential hypertension    Exposure to medical diagnostic radiation    High blood pressure    High cholesterol    History of blood transfusion    low hemoglobin    Osteoarthritis    PONV (postoperative nausea and vomiting)    Pulmonary embolism (HCC)    Pulmonary emphysema (HCC)    Rheumatoid arthritis (HCC)     Physical Exam     ED Triage Vitals   BP 10/17/24 1402 (!) 200/98   Pulse 10/17/24 1400 108   Resp 10/17/24 1400 22   Temp 10/17/24 1400 96.9 °F (36.1 °C)   Temp src --    SpO2 10/17/24 1400 92 %   O2 Device 10/17/24 1400 None (Room air)       Current Vitals:   Vital Signs  BP: (!) 154/99  Pulse: 110  Resp: 20  Temp: 96.9 °F (36.1 °C)  MAP (mmHg): (!) 114    Oxygen Therapy  SpO2: 92 %  O2 Device: Nasal cannula  O2 Flow Rate (L/min): 2 L/min    Physical Exam  Vital signs reviewed. Nursing note reviewed.  Constitutional: Well-developed.  Well-nourished. In no acute distress  HENT: Mucous membranes moist.   EYES: No scleral icterus or conjunctival injection.  NECK: Full ROM. Supple.   CARDIAC: Normal rate. Normal S1/ S2. 2+ distal pulses. No edema  PULM/CHEST: Clear to auscultation bilaterally. No wheezes  ABD: Soft, non-tender, non-distended.   RECTAL: deferred  Extremities: No obvious deformity  NEURO: Awake, alert, following commands, moving extremities, answering questions.   SKIN: Warm and dry. No rash or lesions.  PSYCH: Normal judgment. Normal affect.        ED Course     Labs Reviewed   CBC WITH DIFFERENTIAL WITH PLATELET - Abnormal; Notable for the following components:       Result Value    WBC 12.2 (*)     RBC 3.49 (*)     HGB 9.2 (*)     HCT 30.2 (*)     MCHC 30.5 (*)     RDW-SD 54.3 (*)     RDW 17.9 (*)     Neutrophil Absolute Prelim 9.87 (*)     Neutrophil Absolute 9.87 (*)     All other components within normal limits   BASIC METABOLIC PANEL (8) - Abnormal; Notable for the following components:    Glucose 162 (*)     Potassium 3.4 (*)     Calcium, Total 8.6 (*)     Calculated Osmolality 297 (*)     All other components within normal limits   HEPATIC FUNCTION PANEL (7) - Normal   TROPONIN I HIGH SENSITIVITY - Normal   PRO BETA NATRIURETIC PEPTIDE - Normal     EKG    Rate, intervals and axes as noted on EKG Report.  Rate: 101  Rhythm: sinus tachycardia  Reading: No ectopy, no ST or T wave changes  MDM      Assessment:Patient is a 84 year old female presenting to the ED due to dyspnea, cough.    Comorbidities/chronic illnesses impacting care: COPD, GI bleed, Prior PE    History obtained from: patient    Laboratory results above were independently viewed and interpreted by me.  External records and previous hospitalization records reviewed and documented below      Radiography/Imaging:    CT CHEST PE AORTA (IV ONLY) (CPT=71260)   Final Result   PROCEDURE: CT CHEST PE AORTA (IV ONLY) (CPT=71260)       COMPARISON: [facility],  XR CHEST AP PORTABLE (CPT=71045),    10/17/2024, 2:36 PM.  [facility], CT CHEST(CONTRAST ONLY)    (CPT=71260), 4/26/2024, 11:31 AM.       INDICATIONS: Cough x3 weeks. Hx of COPD.       TECHNIQUE: CT images of the chest were obtained with non-ionic intravenous    contrast material.  Automated exposure control for dose reduction was    used. Adjustment of the mA and/or kV was done based on the patient's size.    Use of iterative reconstruction    technique for dose reduction was used. Dose information is transmitted to    the ACR (American College of Radiology) NRDR (National Radiology Data    Registry) which includes the Dose Index Registry.       FINDINGS:    CARDIAC: Mild coronary artery calcification.  Mild mitral annular    calcification.   MEDIASTINUM/DYLON: Mild lower right paratracheal lymph node enlargement-10    cm short axis and lower left paratracheal lymph node enlargement -11 mm.    PULMONARY ARTERIES: Stable enlargement of main pulmonary artery-3.2 cm.     No pulmonary embolus through proximal subsegmental level.     AORTA: Irregular atherosclerotic plaque in the aorta.  Aortic root    measures 4 cm. No aneurysm or dissection.   LUNGS/PLEURA: Emphysema with coexistent fibrotic changes in the periphery    of the mid and upper lungs.  Some new patchy ground-glass attenuation    opacities including a 2.6 cm region in the posterior right upper lobe.     More dense confluent opacity anterior    right upper lobe and right middle lobe associated with multiple peripheral    gas-filled cyst-like foci and slightly more confluent lingular opacities.     A chronic elongated nodular bandlike region of scarring in the right    middle lobe.   CHEST WALL: Right Port-A-Cath is looped back on itself in the SVC.     Postsurgical changes in the right breast.   LIMITED ABDOMEN: Small hiatal hernia.  A 3.9 cm simple left renal cyst.     No suspect abnormality.   BONES: Normal.  No suspect bone lesion or  fracture.       OTHER: Negative.                         =====   CONCLUSION:    1. No pulmonary embolus.   2. Emphysema with fibrotic changes in periphery of mid and upper lungs.     More dense confluent opacities in the right upper lobe and right middle    lobe and to a lesser extent lingula may be related to acute pneumonia or    more pronounced fibrosis.  2.6 cm    ground-glass opacity in the posterior right upper lobe may represent    pneumonia or a new area of fibrosis.   3. Mild nonspecific paratracheal lymph node enlargement.   4. Tip of Port-A-Cath is looped back on itself in the SVC.                 Dictated by (CST): Michael Gonzalez MD on 10/17/2024 at 5:58 PM        Finalized by (CST): Michael Gonzalez MD on 10/17/2024 at 6:12 PM               XR CHEST AP PORTABLE  (CPT=71045)   Final Result   PROCEDURE: XR CHEST AP PORTABLE  (CPT=71045)   TIME: 1436       COMPARISON: [facility], XR CHEST PA + LAT CHEST    (CPT=71046), 3/06/2024, 3:59 PM.  [facility], CT    CHEST(CONTRAST ONLY) (CPT=71260), 4/26/2024, 11:31 AM.  [facility], XR CHEST AP PORTABLE (CPT=71045),    2/23/2024, 1:31 PM.       INDICATIONS: Generalized weakness and hypoxia. History of COPD.       TECHNIQUE:   Single view.         Impression:       Stable heart size.  The tip of the port a catheter remains malpositioned    in the azygos vein.  Normal vascularity       Stable low lung volumes.  Mild ill-defined opacities throughout the    bilateral perihilar and mid lungs have increased from March 6, 2024       No pneumothorax or large effusion   Finalized by (CST): lAec Davis MD on 10/17/2024 at 3:07 PM                          Imaging result above were independently viewed and interpreted by me.    Consideration of Social Determinants of Health and Impact on Medical Decision Making:  Housing/Transportation/Financial Strain/Access to healthcare/Food insecurity/family or Community support/Language  and Literacy/Substance abuse/Mental health concerns/Disabilities     -none    ED course  Patient arrives here mildly tachycardic, low normal oxygen, hypertensive.  She is quite complicated with a history of PE, intolerant of anticoagulation due to GI bleed, is on monthly octreotide injections, iron infusions.  Her IVC filter has since been removed.  With her cough and fatigue, concern for pneumonia, PE.  She denies any melena.  Will check labs, CT chest.  Her breath sounds are clear, will hold nebs or steroids at this point.    Progress note: Labs reviewed, has mild leukocytosis.  Otherwise unrevealing.  CT reviewed, has signs of bilateral infiltrates, worsening pulmonary fibrosis.  With her hypoxia here, and persistent tachycardia on reexamination, will plan to give antibiotics for community-acquired pneumonia, consult her pulmonologist and admit.    Time spent providing Critical Care Services to the patient (excluding time spent by the resident/mid-level provider and time spent performing separately billable procedures): 45 minutes.  Most of the time was spent at the bedside of the patient, reevaluating the patient and vital signs, explaining conditions and results to the patient and/or family, as well as speaking to the PMD and/or Consultant(s).        Medications   cefTRIAXone (Rocephin) 2 g in sodium chloride 0.9% 100 mL IVPB-ADDV (has no administration in time range)   azithromycin (Zithromax) tab 500 mg (has no administration in time range)   iopamidol 76% (ISOVUE-370) injection for power injector (70 mL Intravenous Given 10/17/24 8695)     Disposition and Plan     Clinical Impression:  1. Acute respiratory failure with hypoxia (HCC)    2. Pneumonia of both lungs due to infectious organism, unspecified part of lung                     10/17 H&P  HISTORY AND PHYSICAL EXAMINATION     CHIEF COMPLAINT:  Community-acquired pneumonia and COPD exacerbation.      HISTORY OF PRESENT ILLNESS:  The patient is an  84-year-old  female with underlying pulmonary fibrosis and chronic obstructive pulmonary disease.  Presented today to the Emergency Department for evaluation of low pulse ox and progressive cough and shortness of breath.  Upon arrival to the emergency room, noted to have pulse ox of 89% on room air.  Started on nasal cannula oxygen support.  CBC shows white blood cell count of 12.2 with left shift.  Chemistry and liver function tests, troponin, ProBNP were negative. Chest x-ray and CT scan of the chest showed multifocal pneumonitis, right middle lobe and lingula with background of pulmonary fibrosis.  Patient was started on IV antibiotics and she will be admitted to the hospital for further management.      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  She had history of hypertension, rheumatoid arthritis, diabetes mellitus type 2, hyperlipidemia, anxiety and generalized osteoarthritis.      REVIEW OF SYSTEMS:  Patient reports progressive cough, productive of yellowish phlegm.  Denies any fever or chills.  She had wheezing and shortness of breath for the last 2 or 3 days.  Denies any dysphagia or choking on liquids or solid food.  No sick contacts.  Other 12-point review of systems is negative.        PHYSICAL EXAMINATION:    GENERAL:  Alert and oriented to time, place and person.  Moderate distress.   VITAL SIGNS:  Temperature 96.9, pulse 101, respiratory rate 26, blood pressure 141/87, pulse ox 89% on room air, 100% on 2L nasal cannula oxygen.  HEENT:  Atraumatic.  Oropharynx clear.  Dry mucous membranes.  Normal heart and soft palate.  Eyes:  Anicteric sclerae.   NECK:  Supple.  No lymphadenopathy.  Trachea midline.  Full range of motion.  LUNGS:  Bilateral rhonchi and faint end expiratory wheezing.   HEART:  Regular rate and rhythm.  S1 and S2 auscultated.   No murmur.  ABDOMEN:  Soft, nondistended.  No tenderness.  Positive bowel sounds.   EXTREMITIES:  Trace edema.  No clubbing or cyanosis.  NEUROLOGIC:  Examination of motor and sensory intact.      ASSESSMENT:    1.       Acute-on-chronic hypoxemic respiratory failure.  2.       Chronic obstructive pulmonary disease exacerbation.  3.       Multifocal pneumonitis.  4.       Diabetes mellitus type 2.      PLAN:  Patient will be admitted to general medical floor.  IV Solu-Medrol nebulizer treatments, Rocephin, azithromycin, sputum culture, Pulmonary consult, oxygen support, DVT prophylaxis.  Monitor her clinical status closely.  Further recommendations to follow.           10/18 Pulm    Reason for Consultation:   Dyspnea     History of Present Illness:   Patient is a 84-year-old female who presents with evidence of increased ongoing dyspnea with some hypoxia over the course of last several days.  Denies use of home oxygen therapy.  States she has been compliant with maintenance inhaler therapy with Wixela.  Admits to some increased cough primarily nonproductive in nature.  Denies significant wheezing.  Denies recent fever or history of pneumonia.  Mild improvement in dyspnea during hospital course      Respiratory: dyspnea, cough, denies wheezing, hemoptysis       Respiratory: Scattered crackles with some expiratory Rales       Assessment   1.  Acute hypoxemic respiratory failure  2.  Likely multifocal pneumonia  3.  COPD with acute exacerbation  4.  Leukocytosis  5.  Fevers  6.  Diabetes mellitus     Plan   -Patient presents with ongoing dyspnea cough for over 2 weeks duration hypoxic on presentation.  -CT chest on presentation with no evidence of pulmonary embolism seen.  Emphysematous changes with fibrotic changes and mid and upper lungs.  More dense opacity seen in right upper and middle lobe with 2.6 cm groundglass opacity in right upper lobe seen.  -Given CT findings agree with empiric coverage with Rocephin and  azithromycin at this time.  Sputum culture pending.  Blood culture ordered.  -Follow fever curve.  -CT imaging likely suggestive of CPFE.  Recommend outpatient pulmonary function testing.  -IV steroids and gradually wean  -Scheduled nebulizer treatments  -ICS/LABA  -Evaluate for home oxygen prior to discharge  -Reviewed vitals, labs and imaging        10/18 IM    Patient presents with    Cough/URI       84-year-old female with past medical history of pulmonary fibrosis, COPD, hypertension, type 2 diabetes mellitus, hyperlipidemia, anxiety presents with COPD exacerbation and pneumonia        COPD exacerbation  Pulmonary is consulted  Continue IV steroids  Continue nebs        Community-acquired pneumonia  Continue IV antibiotics  Pulmonary following  Blood cultures and sputum cultures are pending        Diabetes mellitus  Uncontrolled  Lantus daily at night  Continue sliding scale     With a history of breast cancer, she has a port for chemotherapy which is currently used for iron infusion  Her port is not working at this time therefore we will consult IR for recommendation.                 10/18 IR    Reason for Consultation: chest port malfunction        Subjective:  Chester Bautista is a 84 year old female, history of COPD and pulmonary fibrosis, admitted today via the emergency room for evaluation of progressive cough and shortness of breath.  She is status post chest port placement in 2022, which functioned well until recently.  Chest x-ray and CT scan today demonstrates the catheter tip has migrated into the azygous vein.       Chest port malfunction.  Review of chest CT today demonstrates the catheter tip of the chest port has migrated into the azygous vein.  Previous chest x-ray of at the time of placement in 2022 demonstrated catheter well-positioned just beyond the SVC/RA junction.  Given its malposition and poor function, I recommend chest port removal and replacement, which is scheduled for Monday  at noon.      Plan of care discussed with Ms. Bautista, who indicates understanding.  It.Is possible that she will be discharged over the weekend, but is able to return for her scheduled procedure on Monday.           10/19 Pulm    Assessment and Plan:   1.  Acute on chronic respiratory failure-combined pulmonary fibrosis with emphysema and possible superimposed pneumonia in patient with pneumatoceles and bronchiectasis as well.  Altogether, doing much better.  Hopefully home within a day or so.     Recommendations:  1.  Ongoing antibiotic  2.  Patient follows with me in the outpatient setting and she already has an appointment in the near future.  She can keep that appointment  3.  If home tomorrow, would extend short course antibiotic and steroid.  4.  DuoNebs  5.  Would consider repeat CT scan of the chest at the 6-month interval for follow-up of the radiographic findings and ensure resolved.     2.  DVT prophylaxis-on Lovenox     3.  History of venous thromboembolism and IVC filter-having never tolerated anticoagulation due to GI bleed     Subjective:   Chester Bautista is a(n) 84 year old female who is breathing comfortably off oxygen       Lungs diminished with a few scattered crackles to auscultation and percussion.     Latest Reference Range & Units 10/17/24 15:58 10/18/24 05:05 10/18/24 06:37 10/22/24 14:27   Glucose 70 - 99 mg/dL 162 (H)  340 (H)    Sodium 136 - 145 mmol/L 141  133 (L)    Potassium 3.5 - 5.1 mmol/L 3.4 (L)  4.5    Chloride 98 - 112 mmol/L 106  104    Carbon Dioxide, Total 21.0 - 32.0 mmol/L 29.0  21.0    BUN 9 - 23 mg/dL 18  15    CREATININE 0.55 - 1.02 mg/dL 0.90  0.92    CALCIUM 8.7 - 10.4 mg/dL 8.6 (L)  8.4 (L)    BUN/CREATININE RATIO 10.0 - 20.0  20.0  16.3    EGFR >=60 mL/min/1.73m2 63  61    ANION GAP 0 - 18 mmol/L 6  8    CALCULATED OSMOLALITY 275 - 295 mOsm/kg 297 (H)  290    ALKALINE PHOSPHATASE 55 - 142 U/L 103      AST (SGOT) <34 U/L 21      ALT (SGPT) 10 - 49 U/L 10       Total Bilirubin 0.2 - 1.1 mg/dL 0.2      Bilirubin, Direct <=0.3 mg/dL <0.1      Troponin I (High Sensitivity) <=34 ng/L 13      pro-BETA NATRIURETIC PEPTIDE <450 pg/mL 414      PROTEIN, TOTAL 5.7 - 8.2 g/dL 7.2      Albumin 3.2 - 4.8 g/dL 3.4      WBC 4.0 - 11.0 x10(3) uL 12.2 (H) 12.1 (H)  13.9 (H)   Hemoglobin 12.0 - 16.0 g/dL 9.2 (L) 9.6 (L)  10.0 (L)   Hematocrit 35.0 - 48.0 % 30.2 (L) 30.9 (L)  34.2 (L)   Platelet Count 150.0 - 450.0 10(3)uL 315.0 279.0  296.0   RBC 3.80 - 5.30 x10(6)uL 3.49 (L) 3.66 (L)  3.93                edications 10/13/24 10/14/24 10/15/24 10/16/24 10/17/24 10/18/24 10/19/24   ampicillin-sulbactam (Unasyn) 3 g in sodium chloride 0.9% 100mL IVPB-ADD  Dose: 3 g  Freq: Every 6 hours Route: IV  Start: 10/18/24 1416 End: 10/18/24 1417   Admin Instructions:   (Pharmacist may adjust total duration to 5 days if prior doses received.)   Order specific questions:            1417-D/C'd       azithromycin (Zithromax) tab 500 mg  Dose: 500 mg  Freq: Daily Route: OR  Start: 10/18/24 2000 End: 10/19/24 1927   Admin Instructions:   (Pharmacist may adjust total duration to 3 days if prior doses received.)  Consider alternative antibiotic if baseline QTc >500 ms   Order specific questions:            2024 AB-Given      1927-D/C'd      azithromycin (Zithromax) tab 500 mg  Dose: 500 mg  Freq: Once Route: OR  Start: 10/17/24 1849 End: 10/17/24 1911   Admin Instructions:   Consider alternative antibiotic if baseline QTc >500 ms   Order specific questions:           1911 AK-Given          carvedilol (Coreg) tab 12.5 mg  Dose: 12.5 mg  Freq: 2 times daily with meals Route: OR  Start: 10/17/24 2145 End: 10/19/24 1927        2142 TK-Given      0836 SD-Given     1641 SD-Given      0844 MR-Given          1927-D/C'd      carvedilol (Coreg) tab 12.5 mg  Dose: 12.5 mg  Freq: 2 times daily with meals Route: OR  Start: 10/17/24 2145 End: 10/17/24 2137        2137-D/C'd        cefTRIAXone (Rocephin) 2 g in sodium  chloride 0.9% 100 mL IVPB-ADDV  Dose: 2 g  Freq: Every 24 hours Route: IV  Last Dose: 2 g (10/18/24 1809)  Start: 10/18/24 1900 End: 10/19/24 1927   Admin Instructions:   Ceftriaxone must NOT be administered simultaneously with calcium containing IV solutions. Includes Y-site as well.  In patients other than neonates ceftriaxone and calcium containing products may administered sequentially, provided the line is flushed in between administrations.   Order specific questions:            1809 SD-New Bag           1927-D/C'd      cefTRIAXone (Rocephin) 2 g in sodium chloride 0.9% 100 mL IVPB-ADDV  Dose: 2 g  Freq: Once Route: IV  Last Dose: Stopped (10/17/24 1951)  Start: 10/17/24 1849 End: 10/17/24 1951   Admin Instructions:   Ceftriaxone must NOT be administered simultaneously with calcium containing IV solutions. Includes Y-site as well.  In patients other than neonates ceftriaxone and calcium containing products may administered sequentially, provided the line is flushed in between administrations.   Order specific questions:           1911 AK-New Bag     1951 AK-Stopped              ipratropium-albuterol (Duoneb) 0.5-2.5 (3) MG/3ML inhalation solution 3 mL  Dose: 3 mL  Freq: Every 4 hours Route: Nebulization  Start: 10/18/24 1315 End: 10/19/24 1927   Order specific questions:            1417 LL-Given     1845 AA-Given     2356 NG-Given      0351 NG-Given     0739 NC-Given     1131 NC-Given          1927-D/C'd                     methylPREDNISolone sodium succinate (Solu-MEDROL) injection 40 mg  Dose: 40 mg  Freq: Every 8 hours Route: IV  Start: 10/17/24 2015 End: 10/19/24 1927   Admin Instructions:   Reconstitute with 1ml sterile water or saline        2119 TK-Given      0454 TK-Given     1218 SD-Given     2025 AB-Given      0450 AB-Given     1247 MR-Given     1927-D/C'd      potassium chloride (Klor-Con M20) tab 40 mEq  Dose: 40 mEq  Freq: Once Route: OR  Start: 10/17/24 2045 End: 10/17/24 2119   Admin  Instructions:   Do not crush        2119 TK-Given              acetaminophen (Tylenol Extra Strength) tab 500 mg  Dose: 500 mg  Freq: Every 4 hours PRN Route: OR  PRN Reason: Fever  PRN Comment: equal to or greater than 100.4  Start: 10/17/24 2002 End: 10/18/24 0456   Admin Instructions:   do not initiate oral therapy until 6-8 hours after the last IV acetaminophen dose if IV acetaminophen was used previously        2119 TK-Given      0455 TK-Given     0456-D/C'd            guaiFENesin (Robitussin) 100 MG/5 ML oral liquid 200 mg  Dose: 200 mg  Freq: Every 4 hours PRN Route: OR  PRN Reason: cough  Start: 10/17/24 2002 End: 10/19/24 1927        2142 TK-Given      1630 SD-Given     2322 AB-Given      1927-D/C'd            labetalol (Trandate) 5 mg/mL injection 10 mg  Dose: 10 mg  Freq: Every 4 hours PRN Route: IV  PRN Reason: Increased blood pressure  Start: 10/17/24 2137 End: 10/19/24 1927   Admin Instructions:   SBP >160.  Do not give if HR is less than 60.        2148 TK-Given       0450 AB-Given     1927-D/C'd              MEDICATIONS ADMINISTERED IN LAST 1 DAY:  albuterol (Ventolin) (2.5 MG/3ML) 0.083% nebulizer solution 5 mg       Date Action Dose Route User    Discharged on 10/22/2024    10/22/2024 1533 Given 5 mg Nebulization Oliva, Patrice, RCP          ipratropium (Atrovent) 0.02 % nebulizer solution 0.5 mg       Date Action Dose Route User    Discharged on 10/22/2024    10/22/2024 1533 Given 0.5 mg Nebulization Oliva, Patrice, RCP          dexamethasone (Decadron) tab 10 mg       Date Action Dose Route User    Discharged on 10/22/2024    10/22/2024 1707 Given 10 mg Oral Eugeina Tovar RN              Date/Time Temp Pulse Resp BP SpO2 Weight O2 Device O2 Flow Rate (L/min) [facility]   10/19/24 1210 97.9 °F (36.6 °C) 89 18 144/68 96 % -- None (Room air) -- MR   10/19/24 0833 97.5 °F (36.4 °C) 97 16 131/86 95 % -- None (Room air) --    10/19/24 0600 -- 92 -- 152/78 -- -- -- -- AB   10/19/24 0529 -- 89 -- 165/80  Abnormal  -- -- -- -- AB   10/19/24 0444 98 °F (36.7 °C) 106 18 174/81 Abnormal  93 % -- Nasal cannula 1 L/min AB   10/19/24 0022 97.8 °F (36.6 °C) 91 18 156/75 95 % -- Nasal cannula 1 L/min AB   10/18/24 2019 97.9 °F (36.6 °C) 96 18 158/70 98 % -- Nasal cannula 1 L/min AB   10/18/24 1845 -- -- -- -- 94 % -- Nasal cannula 1 L/min AA   10/18/24 1835 -- -- -- 149/71 -- -- -- -- SD   10/18/24 1632 97.8 °F (36.6 °C) 97 18 181/83 Abnormal  96 % -- Nasal cannula 1 L/min SD   10/18/24 1212 98.4 °F (36.9 °C) 92 18 147/75 91 % -- Nasal cannula 1 L/min SD   10/18/24 0833 97.3 °F (36.3 °C) 93 18 168/80 Abnormal  96 % -- Nasal cannula 1 L/min SD   10/18/24 0452 97.5 °F (36.4 °C) 90 16 107/78 95 % -- Nasal cannula 2 L/min TK   10/17/24 2301 99.9 °F (37.7 °C) 95 18 153/66 92 % -- Nasal cannula 2 L/min TK   10/17/24 2214 -- -- -- 181/70 Abnormal  -- -- -- -- TK   10/17/24 2143 100 °F (37.8 °C) 108 20 215/90 Abnormal  96 % -- Nasal cannula 2 L/min TK   10/17/24 2017 -- -- -- -- -- 143 lb 9.6 oz (65.1 kg) -- -- TK   10/17/24 2012 100.9 °F (38.3 °C) Abnormal  109 20 169/96 Abnormal  98 % -- Nasal cannula 2 L/min TK   10/17/24 1930 -- 103 15 148/93 Abnormal  98 % -- Nasal cannula 2 L/min AK   10/17/24 1900 -- 101 26 141/87 100 % -- Nasal cannula 2 L/min AK   10/17/24 1845 -- 106 26 145/95 Abnormal  100 % -- Nasal cannula 2 L/min AK   10/17/24 1807 -- -- -- -- 92 % -- Nasal cannula 2 L/min KS   10/17/24 1800 -- 110 20 154/99 Abnormal  89 % Abnormal  -- None (Room air) -- KS   10/17/24 1543 -- -- -- -- -- -- None (Room air) -- AK   10/17/24 1430 -- 99 -- 161/88 Abnormal  93 % -- None (Room air) -- AK   10/17/24 1409 -- 106 18 165/92 Abnormal  91 % -- None (Room air) -- AK   10/17/24 1402 -- -- -- 200/98 Abnormal  -- -- -- -- CC   10/17/24 1400 96.9 °F (36.1 °C) 108 22 -- 92 % 147 lb (66.7 kg) None (Room air) -- CC           --------------  DISCHARGE REVIEW    Payor: TRINO ARREGUIN [facility]  Subscriber #:  [id number]  Authorization Number:  [id number]    Admit date: 10/17/24  Admit time:   8:01 PM  Discharge Date: 10/19/2024  5:26 PM     Admitting Physician: Raegan Manzo MD  Attending Physician:  No att. providers found  Primary Care Physician: Heike Sorto MD          Discharge Summary Notes        Discharge Summary signed by Sonya Lozano MD at 10/19/2024  3:54 PM       Author: Sonya Lozano MD Specialty: HOSPITALIST Author Type: Physician    Filed: 10/19/2024  3:54 PM Date of Service: 10/19/2024 12:10 PM Status: Addendum    : Sonya Lozano MD (Physician)    Related Notes: Original Note by Sonya Lozano MD (Physician) filed at 10/19/2024  3:53 PM         [facility]  part of [facility]    Discharge Summary    Chester Bautista Patient Status:  Inpatient    1940 MRN [id number]   Location [facility] Attending Sonya Lozano MD   Hosp Day # 2 PCP Heike Sorto MD     Date of Admission: 10/17/2024 Disposition: Home or Self Care     Date of Discharge: 10/19/2024     Admitting Diagnosis: Acute respiratory failure with hypoxia (HCC) [J96.01]  Pneumonia of both lungs due to infectious organism, unspecified part of lung [J18.9]    Hospital Discharge Diagnoses:   COPD exacerbation  Community Acquired pneumonia  H/o Breast cancer     Lace+ Score: 73  59-90 High Risk  29-58 Medium Risk  0-28   Low Risk.    TCM Follow-Up Recommendation:  LACE > 58: High Risk of readmission after discharge from the hospital.      Problem List:   Patient Active Problem List   Diagnosis    Adult general medical examination    Vaccine counseling    Colon cancer screening    Type 2 diabetes mellitus without retinopathy (HCC)    Sarcoidosis    Anemia, iron deficiency    Dyslipidemia    Hypercalcemia    Hypertension    Infiltrating ductal carcinoma of right breast, stage 1 (HCC)    Rheumatoid arthritis (HCC)    Osteoarthrosis    Peripheral vascular disease due to secondary diabetes mellitus (HCC)     Malabsorption (HCC)    Neuropathy    AVM (arteriovenous malformation) of duodenum, acquired    Pseudophakia of both eyes    Vitreous floaters of both eyes    Glaucoma suspect of both eyes    Iron deficiency anemia due to chronic blood loss    Malabsorption of iron ([facility])    Port-A-Cath in place    Chronic obstructive pulmonary disease, unspecified (HCC)    Adnexal mass    Encounter for care related to vascular access port    High risk medication use    Meibomian gland dysfunction (MGD) of both eyes    Hypokalemia    Acute pulmonary embolism (HCC)    VTE (venous thromboembolism)    Acute on chronic blood loss anemia    Hyperkalemia    DARWIN (acute kidney injury) ([facility])    Hypomagnesemia    Constipation    Urinary retention    AVM (arteriovenous malformation) of duodenum, acquired with hemorrhage    Orthostatic dizziness    Gastric AVM    Other pulmonary embolism without acute cor pulmonale (HCC)    AVM (arteriovenous malformation) ([facility])    Leukocytosis    Presence of IVC filter    CKD (chronic kidney disease) stage 3, GFR 30-59 ml/min ([facility])    Dry eye syndrome of both eyes    Acute respiratory failure with hypoxia ([facility])    Pneumonia of both lungs due to infectious organism, unspecified part of lung    Pneumonia       Physical Exam:   General appearance: alert, appears stated age and cooperative  Pulmonary:  clear to auscultation bilaterally  Cardiovascular: S1, S2 normal, no murmur, click, rub or gallop, regular rate and rhythm  Abdominal: soft, non-tender; bowel sounds normal; no masses,  no organomegaly  Extremities: extremities normal, atraumatic, no cyanosis or edema  Psychiatric: calm      History of Present Illness: The patient is an 84-year-old  female with underlying pulmonary fibrosis and chronic obstructive pulmonary disease. Presented today to the Emergency Department for evaluation of low pulse ox and progressive cough and shortness of breath.     Hospital Course: 84-year-old female with past medical  history of pulmonary fibrosis, COPD, hypertension, type 2 diabetes mellitus, hyperlipidemia, anxiety presents with COPD exacerbation and pneumonia, pulmonary was consulted patient was started on conservative management with DuoNebs IV steroids and antibiotics.  Patient did well over the course of the day and she was discharged home on p.o. prednisone taper and p.o. doxycycline.     IR was consulted secondary to malfunctioning Port-A-Cath, IR provided her with an outpatient follow-up for management of port a cath.      Consultations: Pulmonary       Procedures:     CT Chest PE protocol     CONCLUSION:   1. No pulmonary embolus.   2. Emphysema with fibrotic changes in periphery of mid and upper lungs.  More dense confluent opacities in the right upper lobe and right middle lobe and to a lesser extent lingula may be related to acute pneumonia or more pronounced fibrosis.  2.6 cm   ground-glass opacity in the posterior right upper lobe may represent pneumonia or a new area of fibrosis.   3. Mild nonspecific paratracheal lymph node enlargement.   4. Tip of Port-A-Cath is looped back on itself in the SVC.       Complications: none     Discharge Condition: Good    Discharge Medications:      Discharge Medications        START taking these medications        Instructions Prescription details   Doxycycline Monohydrate 100 MG Caps  Commonly known as: MONODOX      Take 1 capsule (100 mg total) by mouth 2 (two) times daily.   Quantity: 20 capsule  Refills: 0            CONTINUE taking these medications        Instructions Prescription details   acetaminophen 500 MG Tabs  Commonly known as: Tylenol Extra Strength      Take 1 tablet (500 mg total) by mouth daily.   Refills: 0     albuterol 108 (90 Base) MCG/ACT Aers  Commonly known as: Ventolin HFA      inhale 2 puff by inhalation route  every 4 - 6 hours as needed   Quantity: 1 each  Refills: 5     albuterol 108 (90 Base) MCG/ACT Aers  Commonly known as: Ventolin HFA      inhale  2 puff by inhalation route  every 4 - 6 hours as needed   Quantity: 1 each  Refills: 0     BD Pen Needle Tata 2nd Gen 32G X 4 MM Misc  Generic drug: Insulin Pen Needle      1 EACH DAILY. USE DAILY WITH INSULIN   Quantity: 100 each  Refills: 1     Blood Gluc Meter Disp-Strips Nica      Dx. E11.9. Checks qam.   Quantity: 100 each  Refills: 0     Blood Glucose System Adrian Kit      DX E 11.9.  Checks every morning.   Quantity: 1 kit  Refills: 0     Blood Glucose Monitoring Suppl w/Device Kit      DX E 11.9.  Checks every morning.   Quantity: 1 kit  Refills: 0     OneTouch Verio w/Device Kit      1 each daily. Test 1x daily   Quantity: 1 kit  Refills: 0     Blood Glucose System Adrian Kit      Dx. E11.9. Checks qam.   Quantity: 1 kit  Refills: 0     Blood Glucose Monitoring Suppl w/Device Kit      Dx. E11.9. Checks qam.   Quantity: 1 kit  Refills: 0     carvedilol 12.5 MG Tabs  Commonly known as: Coreg      Take 1 tablet (12.5 mg total) by mouth 2 (two) times daily with meals.   Quantity: 180 tablet  Refills: 3     cyclobenzaprine 10 MG Tabs  Commonly known as: Flexeril      Take 1 tablet (10 mg total) by mouth nightly as needed.   Quantity: 90 tablet  Refills: 1     famotidine 40 MG Tabs  Commonly known as: Pepcid      Take 1 tablet (40 mg total) by mouth 2 (two) times daily.   Refills: 0     fluticasone-salmeterol 250-50 MCG/ACT Aepb  Commonly known as: Wixela Inhub      Inhale 1 puff into the lungs every 12 (twelve) hours.   Quantity: 1 each  Refills: 5     gabapentin 100 MG Caps  Commonly known as: Neurontin      Take 1 capsule (100 mg total) by mouth nightly.   Quantity: 90 capsule  Refills: 3     glipiZIDE 5 MG Tabs  Commonly known as: Glucotrol      Take 1 tablet (5 mg total) by mouth every morning before breakfast.   Quantity: 30 tablet  Refills: 1     glycopyrrolate 1 MG Tabs  Commonly known as: Robinul      Take 1 tablet (1 mg total) by mouth 3 (three) times daily.   Quantity: 90 tablet  Refills: 1      hydroxychloroquine 200 MG Tabs  Commonly known as: Plaquenil      Take 1 tablet (200 mg total) by mouth daily.   Quantity: 90 tablet  Refills: 0     Lancets Misc      DX E 11.9.  Checks every morning.   Quantity: 50 each  Refills: 11     OneTouch Delica Lancets 33G Misc      4 x daily   Quantity: 100 each  Refills: 1     Accu-Chek FastClix Lancets Misc      Check sugar once daily as directed (E11.42)   Quantity: 100 each  Refills: 2     Lancets Misc      Dx. E11.9. Checks qam.   Quantity: 50 each  Refills: 6     Lantus SoloStar 100 UNIT/ML Sopn  Generic drug: insulin glargine      Inject 10 Units into the skin daily with dinner.   Quantity: 3 mL  Refills: 1     loratadine 10 MG Tabs  Commonly known as: Claritin      TAKE 1 TABLET BY MOUTH EVERY DAY   Quantity: 30 tablet  Refills: 0     montelukast 10 MG Tabs  Commonly known as: Singulair      Take 1 tablet (10 mg total) by mouth nightly.   Quantity: 90 tablet  Refills: 3     Potassium Chloride ER 20 MEQ Tbcr      Take 40 mEq by mouth every morning.   Quantity: 60 tablet  Refills: 1     predniSONE 5 MG Tabs  Commonly known as: Deltasone      Take 1 tablet (5 mg total) by mouth daily.   Quantity: 90 tablet  Refills: 0     rosuvastatin 20 MG Tabs  Commonly known as: Crestor      Take 1 tablet (20 mg total) by mouth nightly.   Quantity: 90 tablet  Refills: 0     valsartan 80 MG Tabs  Commonly known as: Diovan      Take 1 tablet (80 mg total) by mouth daily.   Quantity: 90 tablet  Refills: 3            STOP taking these medications      Accu-Chek Guide Strp        Gvoke HypoPen 2-Pack 1 MG/0.2ML injection  Generic drug: glucagon        midodrine 2.5 MG Tabs  Commonly known as: ProAmatine        OneTouch Verio Strp        tamsulosin 0.4 MG Caps  Commonly known as: Flomax        torsemide 20 MG Tabs  Commonly known as: Demadex                  Where to Get Your Medications        These medications were sent to [facility]/pharmacy #[id number] - [place], [place] - [address]  [phone], [phone]  [address] [facility] [address]      Phone: [phone]   Doxycycline Monohydrate 100 MG Caps         Follow up Visits: Follow-up with PCP as recommeded.     Sonya Lozano MD  10/19/2024  12:11 PM    > 35 min     Electronically signed by Sonya Lozano MD on 10/19/2024  3:54 PM         REVIEWER COMMENTS

## 2024-10-23 NOTE — TELEPHONE ENCOUNTER
Update noted.     Patient restarted latus last night and took glipizide this morning.     Per cgm glucose patterns:        Please ask patient to inject an additional 10 units of Lantus now       And tonight to modify her medication regimen to the following:  - continue with Glipizide 5mg daily with breakfast   - increase Lantus 13--> 24 units nightly     Schedule apt with CDCES tomorrow.     Thank you!

## 2024-10-23 NOTE — TELEPHONE ENCOUNTER
Received call back from the patient's daughter Brea. She is unable to cancel her mother's iron infusion at the infusion center tomorrow at 2:30pm because she missed the prior infusion.     Nena, daughter wondering if patient can be seen from 1:15pm to 2:15pm tomorrow? This would be overlapping the prior visit by 15 min.

## 2024-10-23 NOTE — TELEPHONE ENCOUNTER
Caren KHAN,    Called daughter and she was not at home. Called patient and confirms she was off medications due to recent port placement and NPO status since Andrae 10/20. Then was admitted to ER due to SOB and O2 sat 85% on 10/22.   Received two breathing treatments and steroid. Per ER notes:    dexamethasone (Decadron) tab 10 mg Order Report Completed Once 10/22/24 10/22/24     methylPREDNISolone sodium succinate (Solu-MEDROL) injection 40 mg Order Report Completed Once 10/22/24 10/22/2     Per patient joselyn reads 350 with \"a line across\" at this time. She refuses to check with glucometer because its on the second floor and she is eating breakfast.    She is eating scrambled eggs, 1 toast one and slice of shipman     Hyperglycemia     Onset of hyperglycemia: 10/10/24 per Joselyn    BG levels Joselyn reports >400 for last three days.    Symptoms No, per patient \" I never have any symptoms\"    Pattern of hyperglycemia: Constant    Steroid therapy: Prednisone 5 mg daily    Acute Illness: Pneumonia on 10/17. On doxycycline for 10 days.    Change in Diet: No    List DM Medications/Compliance:    She started glipizide this morning and injected lantus last night.    Glipizide 5mg daily with breakfast   Lantus 16--> 13 units nightly            Name: Chester Bautista  YOB: 1940  Report Period: 10/10/2024 - 10/23/2024 (14 days)  Generated: 10/23/2024  % Time CGM Active: 75%      Glucose Statistics and Targets  Average Glucose: 275 mg/dL  Glucose Management Indicator (GMI): 9.9%  Glucose Variability (%CV): 34.2%  Target Range: 70 - 180 mg/dL      Time in Ranges  Very High: >250 mg/dL --- 53%  High: 181 - 250 mg/dL --- 24%  Target Range: 70 - 180 mg/dL --- 23%  Low: 54 - 69 mg/dL --- 0%  Very Low: <54 mg/dL --- 0%

## 2024-10-23 NOTE — PROGRESS NOTES
DESI Follow-up Assessment    General:  Assessment completed with: Children (dtr Brea)    Progress/Care Plan:  Is the patient progressing as planned?: Yes  Care Plan Update: To ED with c/o low O2 sats since yesterday. Patient states she had a port removed and replaced with a new one yesterday. Patient states she was admitted last Thursday for shortness of breath. Patient states O2 sats at home was around 86-91% on RA. 91-94% on RA in triage. Dtr did cancel TCM/DESI appt with Dr. Alana Flores (from 10/19/24 hospital discharge), yesterday, as she brought pt back to ER.  New Care Plan: Offered to reschedule TCM/DESI with Dr. Alana Flores--dtr requesting TCM/DESI appt with Dr. Sorto, if possible--this NCM unable to book within recommended DESI appt timeframe, d/t schedule restriction. Sent TE to office staff as FYI/DESI protocol.  Frequency/Follow Up Plan: 7 day f/u, next week, as planned     Notes:  Navigator Notes: Dtr reports pt taking Doxycycline, as prescribed by Dr. Parker 10/19/24--did not  duplicate abx, as prescribed at yesterday's ER visit. Brea confirms she was given nebulizer machine at yesterday's ER visit and she did  nebulizer solution, as well. Dtr denies pt with any fever, chills, shaking, productive cough, headache, vision changes, dizziness, nausea, vomiting, diarrhea, bleeding, irregular heartbeat or fast pulse, loss of vision, speech or strength or coordination in any body part, chest pain or shortness of breath at this time. Dtr reports pt still sleeping, but aware to monitor pulse ox, once pt awake. Dtr aware when to contact PCP/specialists and when to seek emergency care. No further questions/concerns at this time.

## 2024-10-23 NOTE — TELEPHONE ENCOUNTER
Is this a new or existing condition?  Ongoing issue     What is issue/DX: Anemia - D50.9  If applicable:  Left / Right / Bilateral N/A    Have you seen your PCP for this issue? YES          If yes when? Office visit 10/4/24  Have you seen a specialist for this issue? YES        If yes who and when? Dr. Eric novoa 9/26/24    Additional info:  Per Tessa with [facility] Patient needs PCP referral to Dr. Eric Novoa, so [facility] can obtain auth for medication   If agreeable please review and sign off on request   Thank you,  Maggie     If questions Tessa @ [facility] can be reached @ [phone]

## 2024-10-23 NOTE — TELEPHONE ENCOUNTER
LVM for patient to call back. Called daughter Brea to communicate new orders. Daughter verbalizes understanding.     Apt scheduled with CDE tomorrow at 2 pm. (Patient had infusion apt scheduled at 230 pm but she is canceling that apt)    Apt scheduled for 130 pm but she is coming at 2 pm due to schedule conflict. CDE notified of correct time of arrival.

## 2024-10-24 ENCOUNTER — NURSE ONLY (OUTPATIENT)
Dept: ENDOCRINOLOGY CLINIC | Facility: CLINIC | Age: 84
End: 2024-10-24

## 2024-10-24 ENCOUNTER — OFFICE VISIT (OUTPATIENT)
Dept: HEMATOLOGY/ONCOLOGY | Facility: HOSPITAL | Age: 84
End: 2024-10-24
Attending: INTERNAL MEDICINE
Payer: MEDICARE

## 2024-10-24 VITALS
TEMPERATURE: 98 F | RESPIRATION RATE: 16 BRPM | HEART RATE: 92 BPM | OXYGEN SATURATION: 96 % | SYSTOLIC BLOOD PRESSURE: 180 MMHG | DIASTOLIC BLOOD PRESSURE: 75 MMHG

## 2024-10-24 DIAGNOSIS — K90.9 MALABSORPTION OF IRON (HCC): ICD-10-CM

## 2024-10-24 DIAGNOSIS — D50.0 IRON DEFICIENCY ANEMIA DUE TO CHRONIC BLOOD LOSS: ICD-10-CM

## 2024-10-24 DIAGNOSIS — E11.65 UNCONTROLLED TYPE 2 DIABETES MELLITUS WITH HYPERGLYCEMIA (HCC): Primary | ICD-10-CM

## 2024-10-24 DIAGNOSIS — Q27.30 AVM (ARTERIOVENOUS MALFORMATION) (HCC): Primary | ICD-10-CM

## 2024-10-24 LAB
GLUCOSE BLOOD: >444
TEST STRIP LOT #: NORMAL NUMERIC

## 2024-10-24 PROCEDURE — 1111F DSCHRG MED/CURRENT MED MERGE: CPT | Performed by: NURSE PRACTITIONER

## 2024-10-24 PROCEDURE — 82947 ASSAY GLUCOSE BLOOD QUANT: CPT | Performed by: NURSE PRACTITIONER

## 2024-10-24 PROCEDURE — 96374 THER/PROPH/DIAG INJ IV PUSH: CPT

## 2024-10-24 RX ORDER — SODIUM CHLORIDE 9 MG/ML
10 INJECTION, SOLUTION INTRAMUSCULAR; INTRAVENOUS; SUBCUTANEOUS ONCE
OUTPATIENT
Start: 2024-10-25

## 2024-10-24 NOTE — PROGRESS NOTES
Continuous Glucose Monitor Download - hyperglycemia    Chester Bautista  2/21/1940    Has appointment at the infusion center after diabetes education appointment.     Medical Background        Lab Results   Component Value Date    A1C 10.3 (A) 10/14/2024    A1C 10.3 (A) 08/27/2024    A1C 10.3 (A) 07/19/2024    A1C 9.0 (A) 05/21/2024    A1C 9.5 (H) 02/02/2024         Patient has T2DM, seen today regarding hyperglycemia.   See TE 10/21/24    Recent multiple hospitalizations for COPD and pneumonia. Took steroids while in the hospital and also on daily Prednisone since 2003.   Has headaches, no nausea, no vomiting. Increased thirst, increased urinary frequency. No blurry vision. Complaining of urinary and stool incontinence. Currently on antibiotics.   Prednisone 5 mg daily    Care Gaps     Up to date      Medication Review      DM Meds: glipiZIDE Tabs - 5 MG; Lantus SoloStar Sopn - 100 UNIT/ML         Medications at last endocrinology appointment (10/08/24):  a) Medications  - continue with Glipizide 5mg daily with breakfast   - decrease Lantus 16--> 13 units nightly -   Medications adjusted on 10/23/24 (See TE 10/21/24)   continue with Glipizide 5mg daily with breakfast   - increase Lantus 13--> 24 units nightly        Patient Currently Taking:   Glipizide 5 mg before breakfast  Lantus 24 units nightly.       Blood Glucose Review      Using Joselyn 3. Glucose checked during appointment. HHH which is > 444      Interpretation of Data:    Patient is 17% in range, 62% very high. GMI at 10.3 Has several days with glucose greater than 400.                         Recommendations / Plan / Goals   Consulted with provider Caren KHAN. Patient to take one extra Glipizide 5 mg today before dinner, increase lantus to 30 units at bedtime, and increase glipizide to 10 mg before breakfast. Will call patient tomorrow for follow up.   Encouraged patient to stay physically active and well hydrated.         Next Follow up  scheduled for 11/13/24 with Vic KHAN      Routed to provider for review.      10/24/2024  Nena ALBERTS RN [facility]  Diabetes Care &      A total of 45 minutes was spent with the patient including chart review, discussion and education / advisement pertinent to patient and provider specified concerns as documented above.     Note to patient: The 21 Century Cures Act makes medical notes like these available to patients in the interest of transparency. However, be advised this is a medical document. It is intended as peer to peer communication. It is written in medical language and may contain abbreviations or verbiage that are unfamiliar. It may appear blunt or direct. Medical documents are intended to carry relevant information, facts as evident, and the clinical opinion of the practitioner.

## 2024-10-24 NOTE — PROGRESS NOTES
Prudencio to infusion today for Venofer 200mg dose 2 of 3. Due to issues with previous port, pt had port removed and replaced on 10/21. Some bruising and swelling noted to R chest around port area. Iced port site for about 10 minutes prior to accessing. PAC accessed without difficulty using sterile technique, and present blood return noted.      Ordering Provider: Cade Woodard Exp: 1 more dose remaining in order     Pt tolerated infusion without difficulty or complaint. Reviewed next apt date/time: Pt's daughter to call scheduling to arrange for 3rd dose      Education Record  Learner:  Patient  Disease / Diagnosis: BUZZ  Barriers / Limitations:  None  Method:  Reinforcement  General Topics:  Plan of care reviewed  Outcome:  Shows understanding

## 2024-10-24 NOTE — PATIENT INSTRUCTIONS
Take one extra Glipizide 5 mg today (10/24/24) before dinner    Increase lantus to 30 units at bedtime    Increase Glipizide to 10 mg every morning.     Nurse will call you on Friday 10/25/24 around 1 p.m. to see how you are doing    Appointment with Vic KHAN on 11/13/24

## 2024-10-24 NOTE — CDS QUERY
.How to answer this Query:     1.) DON'T CLICK COSIGN BUTTON FIRST  2.) Click \"3 dots...\" to the right of cosign button and click EDIT on the toolbar.  2.) Type an \"X\" in the bracket for the diagnosis that applies. (You may also add additional clinical details as you feel necessary to substantiate your response).   3.) Finally click \"Sign\" to complete response.  Thank You      CLINICAL DOCUMENTATION CLARIFICATION     Dear Doctor Blake,  Acute on chronic respiratory failure was documented in the medical record Please clarify the status of this condition:        PLEASE (X) DIAGNOSIS THAT APPLIES.     (  x  ) Acute on chronic respiratory failure ruled out instead pt with Acute Respiratory Failure with hypoxia.  (    ) Other - please specify: ________________________       Clinical Indicators: no documented home O2 use. O2 sat 89% on RA, placed on 2L NC @ 92% PF Ratio =232 (65/0.28) PNA, COPD exacerbation  CT chest 10/17 - Emphysema with fibrotic changes in periphery of mid and upper lungs.  More dense confluent opacities in the right upper lobe and right middle lobe and to a lesser extent lingula may be related to acute pneumonia or more pronounced fibrosis.  2.6 cm ground-glass opacity in the posterior right upper lobe may represent pneumonia or a new area of fibrosis.   Pulmo Consult - Acute hypoxemic respiratory failure   H&P -  Acute-on-chronic hypoxemic respiratory failure.   DCS - Acute respiratory failure with hypoxia   Treatment:O2 supplement, IV steroid, IV antibiotics    If you have any questions, please contact Clinical :  Colby RANGEL at [phone]     Thank You!    THIS FORM IS A PERMANENT PART OF THE MEDICAL RECORD

## 2024-10-25 ENCOUNTER — NURSE ONLY (OUTPATIENT)
Dept: ENDOCRINOLOGY CLINIC | Facility: CLINIC | Age: 84
End: 2024-10-25

## 2024-10-25 ENCOUNTER — OFFICE VISIT (OUTPATIENT)
Dept: INTERNAL MEDICINE CLINIC | Facility: CLINIC | Age: 84
End: 2024-10-25

## 2024-10-25 VITALS
OXYGEN SATURATION: 99 % | SYSTOLIC BLOOD PRESSURE: 158 MMHG | WEIGHT: 142.19 LBS | HEIGHT: 61 IN | BODY MASS INDEX: 26.85 KG/M2 | DIASTOLIC BLOOD PRESSURE: 65 MMHG | TEMPERATURE: 98 F | HEART RATE: 79 BPM

## 2024-10-25 DIAGNOSIS — D62 ACUTE ON CHRONIC BLOOD LOSS ANEMIA: Primary | ICD-10-CM

## 2024-10-25 DIAGNOSIS — E83.52 HYPERCALCEMIA: ICD-10-CM

## 2024-10-25 DIAGNOSIS — J96.01 ACUTE RESPIRATORY FAILURE WITH HYPOXIA (HCC): ICD-10-CM

## 2024-10-25 DIAGNOSIS — E11.9 TYPE 2 DIABETES MELLITUS WITHOUT RETINOPATHY (HCC): ICD-10-CM

## 2024-10-25 DIAGNOSIS — R53.83 OTHER FATIGUE: ICD-10-CM

## 2024-10-25 DIAGNOSIS — E87.5 HYPERKALEMIA: ICD-10-CM

## 2024-10-25 DIAGNOSIS — E87.6 HYPOKALEMIA: ICD-10-CM

## 2024-10-25 DIAGNOSIS — E11.65 UNCONTROLLED TYPE 2 DIABETES MELLITUS WITH HYPERGLYCEMIA (HCC): Primary | ICD-10-CM

## 2024-10-25 DIAGNOSIS — E78.5 DYSLIPIDEMIA: ICD-10-CM

## 2024-10-25 DIAGNOSIS — N18.31 CHRONIC KIDNEY DISEASE, STAGE 3A (HCC): ICD-10-CM

## 2024-10-25 DIAGNOSIS — Z71.85 VACCINE COUNSELING: ICD-10-CM

## 2024-10-25 DIAGNOSIS — I10 PRIMARY HYPERTENSION: ICD-10-CM

## 2024-10-25 DIAGNOSIS — E11.65 UNCONTROLLED TYPE 2 DIABETES MELLITUS WITH HYPERGLYCEMIA (HCC): ICD-10-CM

## 2024-10-25 LAB
CREAT UR-SCNC: 75.2 MG/DL
MICROALBUMIN UR-MCNC: 74.8 MG/DL
MICROALBUMIN/CREAT 24H UR-RTO: 994.7 UG/MG (ref ?–30)

## 2024-10-25 PROCEDURE — 3078F DIAST BP <80 MM HG: CPT | Performed by: INTERNAL MEDICINE

## 2024-10-25 PROCEDURE — 1111F DSCHRG MED/CURRENT MED MERGE: CPT | Performed by: INTERNAL MEDICINE

## 2024-10-25 PROCEDURE — 1111F DSCHRG MED/CURRENT MED MERGE: CPT | Performed by: NURSE PRACTITIONER

## 2024-10-25 PROCEDURE — 3077F SYST BP >= 140 MM HG: CPT | Performed by: INTERNAL MEDICINE

## 2024-10-25 PROCEDURE — 99496 TRANSJ CARE MGMT HIGH F2F 7D: CPT | Performed by: INTERNAL MEDICINE

## 2024-10-25 PROCEDURE — 1159F MED LIST DOCD IN RCRD: CPT | Performed by: INTERNAL MEDICINE

## 2024-10-25 PROCEDURE — 1160F RVW MEDS BY RX/DR IN RCRD: CPT | Performed by: INTERNAL MEDICINE

## 2024-10-25 PROCEDURE — 1126F AMNT PAIN NOTED NONE PRSNT: CPT | Performed by: INTERNAL MEDICINE

## 2024-10-25 PROCEDURE — 3008F BODY MASS INDEX DOCD: CPT | Performed by: INTERNAL MEDICINE

## 2024-10-25 RX ORDER — VALSARTAN 80 MG/1
80 TABLET ORAL 2 TIMES DAILY
Qty: 180 TABLET | Refills: 3 | Status: SHIPPED | OUTPATIENT
Start: 2024-10-25

## 2024-10-25 NOTE — PROGRESS NOTES
HPI:    Patient ID: Chester Bautista is a 84 year old female.    Chief Complaint   Patient presents with    Hospital F/U     Patient states she is here for a hospital follow up       Date of Admission: 10/17/2024 Disposition: Home or Self Care      Date of Discharge: 10/19/2024      Admitting Diagnosis: Acute respiratory failure with hypoxia (HCC) [J96.01]  Pneumonia of both lungs due to infectious organism, unspecified part of lung [J18.9]     Hospital Discharge Diagnoses:   COPD exacerbation  Community Acquired pneumonia  H/o Breast cancer      TCM Follow-Up Recommendation:  LACE > 58: High Risk of readmission after discharge from the hospital.        Problem List:       Patient Active Problem List   Diagnosis    Adult general medical examination    Vaccine counseling    Colon cancer screening    Type 2 diabetes mellitus without retinopathy (HCC)    Sarcoidosis    Anemia, iron deficiency    Dyslipidemia    Hypercalcemia    Hypertension    Infiltrating ductal carcinoma of right breast, stage 1 (HCC)    Rheumatoid arthritis (HCC)    Osteoarthrosis    Peripheral vascular disease due to secondary diabetes mellitus (HCC)    Malabsorption (HCC)    Neuropathy    AVM (arteriovenous malformation) of duodenum, acquired    Pseudophakia of both eyes    Vitreous floaters of both eyes    Glaucoma suspect of both eyes    Iron deficiency anemia due to chronic blood loss    Malabsorption of iron (HCC)    Port-A-Cath in place    Chronic obstructive pulmonary disease, unspecified (HCC)    Adnexal mass    Encounter for care related to vascular access port    High risk medication use    Meibomian gland dysfunction (MGD) of both eyes    Hypokalemia    Acute pulmonary embolism (HCC)    VTE (venous thromboembolism)    Acute on chronic blood loss anemia    Hyperkalemia    DARWIN (acute kidney injury) (HCC)    Hypomagnesemia    Constipation    Urinary retention    AVM (arteriovenous malformation) of duodenum, acquired with hemorrhage     Orthostatic dizziness    Gastric AVM    Other pulmonary embolism without acute cor pulmonale (HCC)    AVM (arteriovenous malformation) (HCC)    Leukocytosis    Presence of IVC filter    CKD (chronic kidney disease) stage 3, GFR 30-59 ml/min ([facility])    Dry eye syndrome of both eyes    Acute respiratory failure with hypoxia ([facility])    Pneumonia of both lungs due to infectious organism, unspecified part of lung    Pneumonia         Physical Exam:   General appearance: alert, appears stated age and cooperative  Pulmonary:  clear to auscultation bilaterally  Cardiovascular: S1, S2 normal, no murmur, click, rub or gallop, regular rate and rhythm  Abdominal: soft, non-tender; bowel sounds normal; no masses,  no organomegaly  Extremities: extremities normal, atraumatic, no cyanosis or edema  Psychiatric: calm        History of Present Illness: The patient is an 84-year-old  female with underlying pulmonary fibrosis and chronic obstructive pulmonary disease. Presented today to the Emergency Department for evaluation of low pulse ox and progressive cough and shortness of breath.      Hospital Course: 84-year-old female with past medical history of pulmonary fibrosis, COPD, hypertension, type 2 diabetes mellitus, hyperlipidemia, anxiety presents with COPD exacerbation and pneumonia, pulmonary was consulted patient was started on conservative management with DuoNebs IV steroids and antibiotics.  Patient did well over the course of the day and she was discharged home on p.o. prednisone taper and p.o. doxycycline.     IR was consulted secondary to malfunctioning Port-A-Cath, IR provided her with an outpatient follow-up for management of port a cath.        Consultations: Pulmonary         Procedures:      CT Chest PE protocol      CONCLUSION:   1. No pulmonary embolus.   2. Emphysema with fibrotic changes in periphery of mid and upper lungs.  More dense confluent opacities in the right upper lobe and right middle lobe and  to a lesser extent lingula may be related to acute pneumonia or more pronounced fibrosis.  2.6 cm   ground-glass opacity in the posterior right upper lobe may represent pneumonia or a new area of fibrosis.   3. Mild nonspecific paratracheal lymph node enlargement.   4. Tip of Port-A-Cath is looped back on itself in the SVC.        Complications: none      Discharge Condition: Good     Discharge Medications:       Discharge Medications          START taking these medications         Instructions Prescription details   Doxycycline Monohydrate 100 MG Caps  Commonly known as: MONODOX       Take 1 capsule (100 mg total) by mouth 2 (two) times daily.    Quantity: 20 capsule  Refills: 0                CONTINUE taking these medications         Instructions Prescription details   acetaminophen 500 MG Tabs  Commonly known as: Tylenol Extra Strength       Take 1 tablet (500 mg total) by mouth daily.    Refills: 0      albuterol 108 (90 Base) MCG/ACT Aers  Commonly known as: Ventolin HFA       inhale 2 puff by inhalation route  every 4 - 6 hours as needed    Quantity: 1 each  Refills: 5      albuterol 108 (90 Base) MCG/ACT Aers  Commonly known as: Ventolin HFA       inhale 2 puff by inhalation route  every 4 - 6 hours as needed    Quantity: 1 each  Refills: 0      BD Pen Needle Tata 2nd Gen 32G X 4 MM Misc  Generic drug: Insulin Pen Needle       1 EACH DAILY. USE DAILY WITH INSULIN    Quantity: 100 each  Refills: 1      Blood Gluc Meter Disp-Strips Nica       Dx. E11.9. Checks qam.    Quantity: 100 each  Refills: 0      Blood Glucose System Adrian Kit       DX E 11.9.  Checks every morning.    Quantity: 1 kit  Refills: 0      Blood Glucose Monitoring Suppl w/Device Kit       DX E 11.9.  Checks every morning.    Quantity: 1 kit  Refills: 0      OneTouch Verio w/Device Kit       1 each daily. Test 1x daily    Quantity: 1 kit  Refills: 0      Blood Glucose System Adrian Kit       Dx. E11.9. Checks qam.    Quantity: 1 kit  Refills: 0       Blood Glucose Monitoring Suppl w/Device Kit       Dx. E11.9. Checks qam.    Quantity: 1 kit  Refills: 0      carvedilol 12.5 MG Tabs  Commonly known as: Coreg       Take 1 tablet (12.5 mg total) by mouth 2 (two) times daily with meals.    Quantity: 180 tablet  Refills: 3      cyclobenzaprine 10 MG Tabs  Commonly known as: Flexeril       Take 1 tablet (10 mg total) by mouth nightly as needed.    Quantity: 90 tablet  Refills: 1      famotidine 40 MG Tabs  Commonly known as: Pepcid       Take 1 tablet (40 mg total) by mouth 2 (two) times daily.    Refills: 0      fluticasone-salmeterol 250-50 MCG/ACT Aepb  Commonly known as: Wixela Inhub       Inhale 1 puff into the lungs every 12 (twelve) hours.    Quantity: 1 each  Refills: 5      gabapentin 100 MG Caps  Commonly known as: Neurontin       Take 1 capsule (100 mg total) by mouth nightly.    Quantity: 90 capsule  Refills: 3      glipiZIDE 5 MG Tabs  Commonly known as: Glucotrol       Take 1 tablet (5 mg total) by mouth every morning before breakfast.    Quantity: 30 tablet  Refills: 1      glycopyrrolate 1 MG Tabs  Commonly known as: Robinul       Take 1 tablet (1 mg total) by mouth 3 (three) times daily.    Quantity: 90 tablet  Refills: 1      hydroxychloroquine 200 MG Tabs  Commonly known as: Plaquenil       Take 1 tablet (200 mg total) by mouth daily.    Quantity: 90 tablet  Refills: 0      Lancets Misc       DX E 11.9.  Checks every morning.    Quantity: 50 each  Refills: 11      OneTouch Delica Lancets 33G Misc       4 x daily    Quantity: 100 each  Refills: 1      Accu-Chek FastClix Lancets Misc       Check sugar once daily as directed (E11.42)    Quantity: 100 each  Refills: 2      Lancets Misc       Dx. E11.9. Checks qam.    Quantity: 50 each  Refills: 6      Lantus SoloStar 100 UNIT/ML Sopn  Generic drug: insulin glargine       Inject 10 Units into the skin daily with dinner.    Quantity: 3 mL  Refills: 1      loratadine 10 MG Tabs  Commonly known as:  Claritin       TAKE 1 TABLET BY MOUTH EVERY DAY    Quantity: 30 tablet  Refills: 0      montelukast 10 MG Tabs  Commonly known as: Singulair       Take 1 tablet (10 mg total) by mouth nightly.    Quantity: 90 tablet  Refills: 3      Potassium Chloride ER 20 MEQ Tbcr       Take 40 mEq by mouth every morning.    Quantity: 60 tablet  Refills: 1      predniSONE 5 MG Tabs  Commonly known as: Deltasone       Take 1 tablet (5 mg total) by mouth daily.    Quantity: 90 tablet  Refills: 0      rosuvastatin 20 MG Tabs  Commonly known as: Crestor       Take 1 tablet (20 mg total) by mouth nightly.    Quantity: 90 tablet  Refills: 0      valsartan 80 MG Tabs  Commonly known as: Diovan       Take 1 tablet (80 mg total) by mouth daily.    Quantity: 90 tablet  Refills: 3                STOP taking these medications       Accu-Chek Guide Strp         Gvoke HypoPen 2-Pack 1 MG/0.2ML injection  Generic drug: glucagon         midodrine 2.5 MG Tabs  Commonly known as: ProAmatine         OneTouch Verio Strp         tamsulosin 0.4 MG Caps  Commonly known as: Flomax         torsemide 20 MG Tabs  Commonly known as: Demadex                       Where to Get Your Medications          These medications were sent to [facility]/pharmacy #[id number] - [place], [place] - [address] [phone], [phone]  [address] [place], [place] [address] [address]        Phone: [phone]   Doxycycline Monohydrate 100 MG Caps            Patient here for follow up of Diabetes.  Has been taking medications regularly.    Checks sugars 1 times daily. Just adjusted glipized yesterday and increaxsed insulin.   Fasting sugars average  400's.   Watches diabetic diet, low salt.  Diabetic Flow sheet      10/25/2024    10:25 PM 10/25/2024     4:14 PM 10/25/2024     3:59 PM 10/25/2024    12:00 AM 10/24/2024     3:04 PM   DIABETIC FLOWSHEET ([facility])   BMI   26.87 kg/m2     Valsartan 80 mg BID OR 80 mg BID OR  80 mg BID OR  80 mg BID OR  80 mg Daily OR    Weight (enc vitals)    142 lb 3.2 oz     BP (enc vitals)  158/65 173/80  180/75      HISTORY OF DIABETES COMPLICATIONS: :  History of Retinopathy: No.   History of Neuropathy: No.   History of Nephropathy: Yes.      ASSOCIATED COMPLICATIONS:   HTN: Yes.   Hyperlipidemia: Yes.   Coronary Artery Disease:  CAD,  HF, PAD  Cerebrovascular Disease: Yes.      MEALS:  2 meals a day  Cooks at home    Hypertension  Patient is here for follow up of hypertension. BP at home:   Has been compliant with medications.  Exercise level: somewhat active and has not been following low salt diet.  Weight has been stable.  Wt Readings from Last 3 Encounters:   10/25/24 142 lb 3.2 oz (64.5 kg)   10/22/24 147 lb (66.7 kg)   10/21/24 143 lb (64.9 kg)     BP Readings from Last 3 Encounters:   10/25/24 158/65   10/24/24 (!) 180/75   10/22/24 (!) 173/89     Labs:   Lab Results   Component Value Date/Time     (H) 10/18/2024 06:37 AM     (L) 10/18/2024 06:37 AM    K 4.5 10/18/2024 06:37 AM     10/18/2024 06:37 AM    CO2 21.0 10/18/2024 06:37 AM    CREATSERUM 0.92 10/18/2024 06:37 AM    CA 8.4 (L) 10/18/2024 06:37 AM    AST 21 10/17/2024 03:58 PM    ALT 10 10/17/2024 03:58 PM    TSH 0.622 10/20/2023 01:38 PM        Lab Results   Component Value Date/Time    CHOLEST 130 02/16/2024 01:43 PM    HDL 43 02/16/2024 01:43 PM    TRIG 181 (H) 02/16/2024 01:43 PM    LDL 57 02/16/2024 01:43 PM    NONHDLC 87 02/16/2024 01:43 PM            Wt Readings from Last 3 Encounters:   10/25/24 142 lb 3.2 oz (64.5 kg)   10/22/24 147 lb (66.7 kg)   10/21/24 143 lb (64.9 kg)     BP Readings from Last 3 Encounters:   10/25/24 158/65   10/24/24 (!) 180/75   10/22/24 (!) 173/89     Labs:   Lab Results   Component Value Date/Time     (H) 10/18/2024 06:37 AM     (L) 10/18/2024 06:37 AM    K 4.5 10/18/2024 06:37 AM     10/18/2024 06:37 AM    CO2 21.0 10/18/2024 06:37 AM    CREATSERUM 0.92 10/18/2024 06:37 AM    CA 8.4 (L) 10/18/2024 06:37 AM    AST 21 10/17/2024  03:58 PM    ALT 10 10/17/2024 03:58 PM    TSH 0.622 10/20/2023 01:38 PM        Lab Results   Component Value Date/Time    CHOLEST 130 02/16/2024 01:43 PM    HDL 43 02/16/2024 01:43 PM    TRIG 181 (H) 02/16/2024 01:43 PM    LDL 57 02/16/2024 01:43 PM    NONHDLC 87 02/16/2024 01:43 PM          Was feeling weak and hypoxia at 84 5. Returned  to ER and medrol. Breathing is better. Still coughing but not as much.           Review of Systems   Constitutional:  Positive for activity change and fatigue. Negative for appetite change, chills, diaphoresis, fever and unexpected weight change.        Able to do more things.  Cough is improving.  Shortness of breath is improving.   HENT:  Negative for congestion, dental problem, drooling, ear discharge, ear pain, facial swelling, hearing loss, mouth sores, nosebleeds, postnasal drip, rhinorrhea, sinus pressure, sinus pain, sneezing, sore throat, tinnitus, trouble swallowing and voice change.    Eyes:  Negative for photophobia, pain, discharge, redness, itching and visual disturbance.   Respiratory:  Positive for cough and shortness of breath. Negative for apnea, choking, chest tightness, wheezing and stridor.    Cardiovascular:  Negative for chest pain, palpitations and leg swelling.   Gastrointestinal:  Negative for abdominal distention and abdominal pain.   Endocrine: Negative for cold intolerance, heat intolerance, polydipsia, polyphagia and polyuria.   Genitourinary:  Negative for dysuria.   Skin:  Negative for rash.   Allergic/Immunologic: Negative for environmental allergies.   Neurological:  Negative for dizziness, tremors, seizures, syncope, facial asymmetry, speech difficulty, weakness, light-headedness, numbness and headaches.   All other systems reviewed and are negative.        Current Outpatient Medications   Medication Sig Dispense Refill    valsartan 80 MG Oral Tab Take 1 tablet (80 mg total) by mouth 2 (two) times daily. 180 tablet 3    albuterol 108 (90 Base)  MCG/ACT Inhalation Aero Soln TAKE 2 PUFFS BY MOUTH EVERY 4 TO 6 HOURS AS NEEDED 6.7 each 5    albuterol (2.5 MG/3ML) 0.083% Inhalation Nebu Soln Take 3 mL (2.5 mg total) by nebulization every 4 (four) hours as needed for Wheezing or Shortness of Breath. 30 each 0    glipiZIDE 5 MG Oral Tab Take 1 tablet (5 mg total) by mouth every morning before breakfast. 30 tablet 1    Doxycycline Monohydrate 100 MG Oral Cap Take 1 capsule (100 mg total) by mouth 2 (two) times daily. 20 capsule 0    Potassium Chloride ER 20 MEQ Oral Tab CR Take 40 mEq by mouth every morning. 60 tablet 1    loratadine 10 MG Oral Tab TAKE 1 TABLET BY MOUTH EVERY DAY 30 tablet 0    cyclobenzaprine 10 MG Oral Tab Take 1 tablet (10 mg total) by mouth nightly as needed. 90 tablet 1    predniSONE 5 MG Oral Tab Take 1 tablet (5 mg total) by mouth daily. 90 tablet 0    hydroxychloroquine 200 MG Oral Tab Take 1 tablet (200 mg total) by mouth daily. 90 tablet 0    BD PEN NEEDLE KADEN 2ND GEN 32G X 4 MM Does not apply Misc 1 EACH DAILY. USE DAILY WITH INSULIN 100 each 1    insulin glargine (LANTUS SOLOSTAR) 100 UNIT/ML Subcutaneous Solution Pen-injector Inject 10 Units into the skin daily with dinner. 3 mL 1    famotidine 40 MG Oral Tab Take 1 tablet (40 mg total) by mouth 2 (two) times daily.      montelukast 10 MG Oral Tab Take 1 tablet (10 mg total) by mouth nightly. 90 tablet 3    carvedilol 12.5 MG Oral Tab Take 1 tablet (12.5 mg total) by mouth 2 (two) times daily with meals. 180 tablet 3    fluticasone-salmeterol (WIXELA INHUB) 250-50 MCG/ACT Inhalation Aerosol Powder, Breath Activated Inhale 1 puff into the lungs every 12 (twelve) hours. 1 each 5    Lancets Does not apply Misc Dx. E11.9. Checks qam. 50 each 6    Blood Glucose Monitoring Suppl (BLOOD GLUCOSE SYSTEM GILLES) Does not apply Kit Dx. E11.9. Checks qam. 1 kit 0    Blood Glucose Monitoring Suppl w/Device Does not apply Kit Dx. E11.9. Checks qam. 1 kit 0    Blood Gluc Meter Disp-Strips Does not  apply Device Dx. E11.9. Checks qam. 100 each 0    gabapentin 100 MG Oral Cap Take 1 capsule (100 mg total) by mouth nightly. 90 capsule 3    Accu-Chek FastClix Lancets Does not apply Misc Check sugar once daily as directed (E11.42) 100 each 2    rosuvastatin (CRESTOR) 20 MG Oral Tab Take 1 tablet (20 mg total) by mouth nightly. 90 tablet 0    glycopyrrolate 1 MG Oral Tab Take 1 tablet (1 mg total) by mouth 3 (three) times daily. 90 tablet 1    albuterol 108 (90 Base) MCG/ACT Inhalation Aero Soln inhale 2 puff by inhalation route  every 4 - 6 hours as needed 1 each 5    Blood Glucose Monitoring Suppl (ONETOUCH VERIO) w/Device Does not apply Kit 1 each daily. Test 1x daily 1 kit 0    OneTouch Delica Lancets 33G Does not apply Misc 4 x daily 100 each 1    Blood Glucose Monitoring Suppl (BLOOD GLUCOSE SYSTEM GILLES) Does not apply Kit DX E 11.9.  Checks every morning. 1 kit 0    Lancets Does not apply Misc DX E 11.9.  Checks every morning. 50 each 11    Blood Glucose Monitoring Suppl w/Device Does not apply Kit DX E 11.9.  Checks every morning. 1 kit 0    acetaminophen 500 MG Oral Tab Take 1 tablet (500 mg total) by mouth daily.      doxycycline 100 MG Oral Cap Take 1 capsule (100 mg total) by mouth 2 (two) times daily for 7 days. (Patient not taking: Reported on 10/25/2024) 14 capsule 0     Allergies:Allergies[1]    HISTORY:  Past Medical History:    Anxiety state    Cancer (HCC)    breast cancer 2018    Chronic obstructive pulmonary disease, unspecified (HCC)    Chronic obstructive pulmonary disease, unspecified (HCC)    COPD (chronic obstructive pulmonary disease) (HCC)    Diabetes (HCC)    Diabetes mellitus (HCC)    Essential hypertension    Exposure to medical diagnostic radiation    High blood pressure    High cholesterol    History of blood transfusion    low hemoglobin    Osteoarthritis    PONV (postoperative nausea and vomiting)    Pulmonary embolism (HCC)    Pulmonary emphysema (HCC)    Rheumatoid arthritis (HCC)       Past Surgical History:   Procedure Laterality Date    Cataract extraction w/  intraocular lens implant Bilateral 2017    Dr in [facility]     Colonoscopy      Colonoscopy N/A 2022    Procedure: COLONOSCOPY;  Surgeon: Mikey Garcia MD;  Location: [facility] ENDOSCOPY    Colonoscopy N/A 06/15/2023    Procedure: COLONOSCOPY;  Surgeon: Mikey Garcia MD;  Location: [facility] ENDOSCOPY    Correct bunion,simple      right foot      Egd  2023    Dr. Garcia; Duodenal AVM's x4 cauterized    Hysterectomy      , no abnormal Paps, due to heavy bleeding with fibroids.  Not sure if it had bilateral salpingo-oophorectomy.    Ir aneurysm repairm      Computer assisted volumetric craniofomy with clipping of anterior cerebral artery.    Lumpectomy right      Radiation right      Rotator cuff repair          Total knee replacement Right 2010    Total knee replacement Left 2015    Transoral incisionless fundoplication - internal  2012 Fredy fundoplication at [facility] Dr. Fournier.    Fredy fundoplication at [facility] Dr. Fournier.    Trigger finger release      left hand      Yag capsulotomy - ou - both eyes Bilateral 2021    done in [place]      Family History   Problem Relation Age of Onset    Heart Surgery Mother         Leaky valve at 41 y/o.     Prostate Cancer Brother     Cancer Brother     Diabetes Maternal Grandmother     Diabetes Paternal Aunt     Breast Cancer Self 78    Breast Cancer Niece         before 50    Macular degeneration Neg     Glaucoma Neg       Social History:   Social History     Socioeconomic History    Marital status:    Tobacco Use    Smoking status: Former     Current packs/day: 0.00     Types: Cigarettes     Quit date:      Years since quittin.8     Passive exposure: Never    Smokeless tobacco: Never    Tobacco comments:     [id number]   Vaping Use    Vaping status: Never Used   Substance and Sexual Activity    Alcohol  use: Never    Drug use: Never   Social History Narrative    Just moved in from [place].   passed away and that is why moved from [place] to be with daughter who lives in [place].  Currently lives with daughter.     Social Drivers of Health     Financial Resource Strain: Low Risk  (10/22/2024)    Financial Resource Strain     Difficulty of Paying Living Expenses: Not very hard     Med Affordability: No   Food Insecurity: No Food Insecurity (10/17/2024)    Food Insecurity     Food Insecurity: Never true   Transportation Needs: No Transportation Needs (10/22/2024)    Transportation Needs     Lack of Transportation: No   Housing Stability: Low Risk  (10/17/2024)    Housing Stability     Housing Instability: No        PHYSICAL EXAM:   /65 (BP Location: Left arm, Patient Position: Sitting, Cuff Size: adult)   Pulse 79   Temp 98 °F (36.7 °C) (Oral)   Ht 5' 1\" (1.549 m)   Wt 142 lb 3.2 oz (64.5 kg)   SpO2 99%   BMI 26.87 kg/m²   BP Readings from Last 3 Encounters:   10/25/24 158/65   10/24/24 (!) 180/75   10/22/24 (!) 173/89     Wt Readings from Last 3 Encounters:   10/25/24 142 lb 3.2 oz (64.5 kg)   10/22/24 147 lb (66.7 kg)   10/21/24 143 lb (64.9 kg)       Physical Exam  Vitals and nursing note reviewed.   Constitutional:       General: She is not in acute distress.     Appearance: Normal appearance. She is well-developed. She is not ill-appearing, toxic-appearing or diaphoretic.      Interventions: She is not intubated.  HENT:      Head:      Jaw: No trismus.      Right Ear: Tympanic membrane, ear canal and external ear normal. No decreased hearing noted. No laceration, drainage, swelling or tenderness. No middle ear effusion. There is no impacted cerumen. No foreign body. No mastoid tenderness. No PE tube. No hemotympanum. Tympanic membrane is not injected, scarred, perforated, erythematous, retracted or bulging. Tympanic membrane has normal mobility.      Left Ear: Tympanic membrane, ear  canal and external ear normal. No decreased hearing noted. No laceration, drainage, swelling or tenderness.  No middle ear effusion. There is no impacted cerumen. No foreign body. No mastoid tenderness. No PE tube. No hemotympanum. Tympanic membrane is not injected, scarred, perforated, erythematous, retracted or bulging. Tympanic membrane has normal mobility.      Nose:      Right Sinus: No maxillary sinus tenderness or frontal sinus tenderness.      Left Sinus: No maxillary sinus tenderness or frontal sinus tenderness.      Mouth/Throat:      Lips: Pink. No lesions.      Mouth: Mucous membranes are moist. Mucous membranes are not pale, not dry and not cyanotic. No injury, lacerations, oral lesions or angioedema.      Dentition: Does not have dentures. No dental tenderness, gingival swelling, dental abscesses or gum lesions.      Tongue: No lesions. Tongue does not deviate from midline.      Palate: No mass and lesions.      Pharynx: Oropharynx is clear. Uvula midline. No pharyngeal swelling, oropharyngeal exudate, posterior oropharyngeal erythema, uvula swelling or postnasal drip.      Tonsils: No tonsillar exudate or tonsillar abscesses.   Neck:      Thyroid: No thyroid mass or thyromegaly.      Trachea: Trachea and phonation normal.   Cardiovascular:      Rate and Rhythm: Normal rate and regular rhythm.      Pulses: Normal pulses. No decreased pulses.           Carotid pulses are 2+ on the right side and 2+ on the left side.       Radial pulses are 2+ on the right side and 2+ on the left side.        Dorsalis pedis pulses are 2+ on the right side and 2+ on the left side.        Posterior tibial pulses are 2+ on the right side and 2+ on the left side.      Heart sounds: Normal heart sounds, S1 normal and S2 normal.   Pulmonary:      Effort: Pulmonary effort is normal. Tachypnea present. No bradypnea, accessory muscle usage, prolonged expiration, respiratory distress or retractions. She is not intubated.       Breath sounds: Decreased air movement present. No stridor or transmitted upper airway sounds. Decreased breath sounds present. No wheezing, rhonchi or rales.   Chest:      Chest wall: No tenderness.   Abdominal:      General: There is no distension.      Palpations: Abdomen is soft.      Tenderness: There is no abdominal tenderness.   Musculoskeletal:      Cervical back: Neck supple.      Right lower leg: No edema.      Left lower leg: No edema.   Lymphadenopathy:      Head:      Right side of head: No submental, submandibular, preauricular, posterior auricular or occipital adenopathy.      Left side of head: No submental, submandibular, preauricular, posterior auricular or occipital adenopathy.      Cervical: No cervical adenopathy.      Right cervical: No superficial, deep or posterior cervical adenopathy.     Left cervical: No superficial, deep or posterior cervical adenopathy.      Upper Body:      Right upper body: No supraclavicular adenopathy.      Left upper body: No supraclavicular adenopathy.   Skin:     General: Skin is warm and dry.   Neurological:      Mental Status: She is alert and oriented to person, place, and time.   Psychiatric:         Behavior: Behavior normal. Behavior is cooperative.         Thought Content: Thought content normal.              ASSESSMENT/PLAN:     Encounter Diagnoses   Name Primary?    Acute on chronic blood loss anemia Improving. Fu hematology.    Yes    Dyslipidemia Check blood.        Hypercalcemia Check blood.        Primary hypertension Higher. Increase diovan. Call in 2 weeks.  Careful with diet and excercise at least 30 minutes 3-4 times a week. Check blood pressures at different times on different days. Can purchase own blood pressure monitor. If not, check at local pharmacy. Bake foods more and grill occasionally. Avoid fried foods. No salt. Use other seasonings.         Hypokalemia Check blood.      Hyperkalemia Resolved.        Type 2 diabetes mellitus without  retinopathy (HCC) Higher. Just increased meds. FU endoc. Careful with diet and excercise at least 30 minutes 3-4 times a week. Check sugars at different times on different dates. Careful with low sugars. Carry something with you and check sugar if can. Can carry hanh cracker, etc. Decrease carbohydrates. But also, careful with fruits and natural sugars.One serving a day and no more than 1 handful every day. Check feet  every AM and careful with sores and ulcers on feet bilaterally. Check eyes every year with dilated eye exam.  Check sugars.  2-hour postmeal should be less than 140s.  Pre-meal should be 's.  Both equally affected A1c.  Discussed importance of glycemic control to prevent complications of diabetes  -Discussed complications of diabetes include retinopathy, neuropathy, nephropathy and cardiovascular disease  -Discussed ABCs of DM  -Discussed importance of SBGM  -Discussed importance of low CHO diet, recommend 45gm per meal or 135gm per day  -UTD with optho  Check urine.        Vaccine counseling Covid booster at local pharmacy in 2 weeks.  Holding flu and shingles vaccination.       Acute respiratory failure with hypoxia (HCC) Improving.        Other fatigue Check blood.        Chronic kidney disease, stage 3a (HCC) No motrin, ibuprofen, advil, alleve, naprosyn  with these medications.        COPD. Improving. Nebulizer 2 times a day.     Orders Placed This Encounter   Procedures    Vitamin D    Vitamin B12    TSH W Reflex To Free T4       Meds This Visit:  Requested Prescriptions     Signed Prescriptions Disp Refills    valsartan 80 MG Oral Tab 180 tablet 3     Sig: Take 1 tablet (80 mg total) by mouth 2 (two) times daily.       Imaging & Referrals:  None        RTC for medicare physical.          [1]   Allergies  Allergen Reactions    Celebrex [Celecoxib] PALPITATIONS    Penicillins ITCHING and SWELLING    Metformin And Related UNKNOWN    Olmesartan UNKNOWN

## 2024-10-25 NOTE — PROGRESS NOTES
Hyperglycemia Follow up    Chester Bautista  2/21/1940      Chart review:    Lab Results   Component Value Date    A1C 10.3 (A) 10/14/2024    A1C 10.3 (A) 08/27/2024    A1C 10.3 (A) 07/19/2024    A1C 9.0 (A) 05/21/2024    A1C 9.5 (H) 02/02/2024       Patient had appointment yesterday for hyperglycemia review. Made the following adjustments to her medications:  Patient to take one extra Glipizide 5 mg today before dinner, increase lantus to 30 units at bedtime, and increase glipizide to 10 mg before breakfast.       Medications     DM Meds: glipiZIDE Tabs - 5 MG; Lantus SoloStar Sopn - 100 UNIT/ML       Patient currently taking:    Blood Glucose Review      Hypoglycemia overnight noted after medication adjustments.               Goals / Recommendations:     Reviewed CGM report with Caren KHAN. Patient had hypoglycemia overnight. Will decrease insulin to 24 units at bedtime and continue with glipizide 10 mg before breakfast. Patient to call next week with glucose levels and to notify clinic if she has any glucose < 70 during the weekend.   Called patient. Spoke with patient's daughter Brea, release on file. Patient did not wake up during the night when glucose was low. Alarms are on per Brea. They will make adjustments to medications as recommended, call if any glucose < 70, and follow up in one week with glucose levels.         Follow up scheduled for 11/13/24    10/25/2024  Nena ALBERTS RN [facility]  Diabetes Care &      A total of 15 minutes was spent with the patient including chart review, discussion and education / advisement pertinent to patient and provider specified concerns as documented above.     Note to patient: The 21 Century Cures Act makes medical notes like these available to patients in the interest of transparency. However, be advised this is a medical document. It is intended as peer to peer communication. It is written in medical language and may  contain abbreviations or verbiage that are unfamiliar. It may appear blunt or direct. Medical documents are intended to carry relevant information, facts as evident, and the clinical opinion of the practitioner.

## 2024-10-26 NOTE — PATIENT INSTRUCTIONS
ASSESSMENT/PLAN:     Encounter Diagnoses   Name Primary?    Acute on chronic blood loss anemia Improving. Fu hematology.    Yes    Dyslipidemia Check blood.        Hypercalcemia Check blood.        Primary hypertension Higher. Increase diovan. Call in 2 weeks.  Careful with diet and excercise at least 30 minutes 3-4 times a week. Check blood pressures at different times on different days. Can purchase own blood pressure monitor. If not, check at local pharmacy. Bake foods more and grill occasionally. Avoid fried foods. No salt. Use other seasonings.         Hypokalemia Check blood.      Hyperkalemia Resolved.        Type 2 diabetes mellitus without retinopathy (HCC) Higher. Just increased meds. FU endoc. Careful with diet and excercise at least 30 minutes 3-4 times a week. Check sugars at different times on different dates. Careful with low sugars. Carry something with you and check sugar if can. Can carry hanh cracker, etc. Decrease carbohydrates. But also, careful with fruits and natural sugars.One serving a day and no more than 1 handful every day. Check feet  every AM and careful with sores and ulcers on feet bilaterally. Check eyes every year with dilated eye exam.  Check sugars.  2-hour postmeal should be less than 140s.  Pre-meal should be 's.  Both equally affected A1c.  Discussed importance of glycemic control to prevent complications of diabetes  -Discussed complications of diabetes include retinopathy, neuropathy, nephropathy and cardiovascular disease  -Discussed ABCs of DM  -Discussed importance of SBGM  -Discussed importance of low CHO diet, recommend 45gm per meal or 135gm per day  -UTD with optho  Check urine.        Vaccine counseling Covid booster at local pharmacy in 2 weeks.  Holding flu and shingles vaccination.       Acute respiratory failure with hypoxia (HCC) Improving.        Other fatigue Check blood.        Chronic kidney disease, stage 3a (HCC) No motrin, ibuprofen, advil,  alleve, naprosyn  with these medications.        COPD. Improving. Nebulizer 2 times a day.     Orders Placed This Encounter   Procedures    Vitamin D    Vitamin B12    TSH W Reflex To Free T4       Meds This Visit:  Requested Prescriptions     Signed Prescriptions Disp Refills    valsartan 80 MG Oral Tab 180 tablet 3     Sig: Take 1 tablet (80 mg total) by mouth 2 (two) times daily.       Imaging & Referrals:  None        RTC for medicare physical.

## 2024-10-29 DIAGNOSIS — E87.6 HYPOKALEMIA: Primary | ICD-10-CM

## 2024-10-29 RX ORDER — POTASSIUM CHLORIDE 1500 MG/1
40 TABLET, EXTENDED RELEASE ORAL EVERY MORNING
Qty: 60 TABLET | Refills: 1 | Status: SHIPPED | OUTPATIENT
Start: 2024-10-29

## 2024-10-29 NOTE — TELEPHONE ENCOUNTER
I would like her to recheck another potassium in 1 week nonfasting labs just to make sure potassium levels are not going too high because she is also on the Diovan.

## 2024-10-29 NOTE — TELEPHONE ENCOUNTER
Spoke to patient's daughter Brea (on MANUEL), full name and date of birth verified.  Informed of Dr. Sorto's message to check potassium in 1 week.   Daughter also informed refill on the potassium supplement was sent in today.   Daughter verbalized understanding and will inform patient.

## 2024-10-31 ENCOUNTER — OFFICE VISIT (OUTPATIENT)
Dept: PULMONOLOGY | Facility: CLINIC | Age: 84
End: 2024-10-31

## 2024-10-31 ENCOUNTER — OFFICE VISIT (OUTPATIENT)
Dept: HEMATOLOGY/ONCOLOGY | Facility: HOSPITAL | Age: 84
End: 2024-10-31
Attending: INTERNAL MEDICINE
Payer: MEDICARE

## 2024-10-31 VITALS
HEART RATE: 99 BPM | OXYGEN SATURATION: 96 % | HEIGHT: 61 IN | SYSTOLIC BLOOD PRESSURE: 160 MMHG | WEIGHT: 144 LBS | DIASTOLIC BLOOD PRESSURE: 74 MMHG | BODY MASS INDEX: 27.19 KG/M2

## 2024-10-31 DIAGNOSIS — K90.9 MALABSORPTION OF IRON (HCC): ICD-10-CM

## 2024-10-31 DIAGNOSIS — Q27.30 AVM (ARTERIOVENOUS MALFORMATION) (HCC): Primary | ICD-10-CM

## 2024-10-31 DIAGNOSIS — J43.9 PULMONARY EMPHYSEMA, UNSPECIFIED EMPHYSEMA TYPE (HCC): Primary | ICD-10-CM

## 2024-10-31 DIAGNOSIS — D50.0 IRON DEFICIENCY ANEMIA DUE TO CHRONIC BLOOD LOSS: ICD-10-CM

## 2024-10-31 PROCEDURE — 1111F DSCHRG MED/CURRENT MED MERGE: CPT | Performed by: INTERNAL MEDICINE

## 2024-10-31 PROCEDURE — 99213 OFFICE O/P EST LOW 20 MIN: CPT | Performed by: INTERNAL MEDICINE

## 2024-10-31 PROCEDURE — 1159F MED LIST DOCD IN RCRD: CPT | Performed by: INTERNAL MEDICINE

## 2024-10-31 PROCEDURE — 3008F BODY MASS INDEX DOCD: CPT | Performed by: INTERNAL MEDICINE

## 2024-10-31 PROCEDURE — 3077F SYST BP >= 140 MM HG: CPT | Performed by: INTERNAL MEDICINE

## 2024-10-31 PROCEDURE — 96374 THER/PROPH/DIAG INJ IV PUSH: CPT

## 2024-10-31 PROCEDURE — 3078F DIAST BP <80 MM HG: CPT | Performed by: INTERNAL MEDICINE

## 2024-10-31 PROCEDURE — 1126F AMNT PAIN NOTED NONE PRSNT: CPT | Performed by: INTERNAL MEDICINE

## 2024-10-31 RX ORDER — SODIUM CHLORIDE 9 MG/ML
10 INJECTION, SOLUTION INTRAMUSCULAR; INTRAVENOUS; SUBCUTANEOUS ONCE
OUTPATIENT
Start: 2024-11-28

## 2024-10-31 NOTE — PROGRESS NOTES
Pt here for iron. Denies any issues or concerns. Tolerated without difficulty or complaint. Reviewed next appt date/time. Discharge home Ambulating independently       Education Record  Learner:  Patient  Barriers / Limitations:  None  Method:  Reinforcement  General Topics:  Plan of care reviewed  Outcome:  Shows understanding

## 2024-10-31 NOTE — PROGRESS NOTES
The patient is an 84-year-old female who I know well from prior evaluation who comes in now for follow-up.  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  She has pneumatoceles and bronchiectasis as well.  She notes that she is much better but not quite 100%.    Review of Systems:  Vision normal. Ear nose and throat normal. Bowel normal. Bladder function normal. No depression. No thyroid disease. No lymphatic system concerns.  No rash. Muscles and joints unremarkable. No weight loss no weight gain.    Physical Examination:  Vital signs normal. HEENT examination is unremarkable with pupils equal round and reactive to light and accommodation. Neck without adenopathy, thyromegaly, JVD nor bruit. Lungs diminished with scattered crackles mostly anteriorly to auscultation and percussion. Cardiac regular rate and rhythm no murmur. Abdomen nontender, without hepatosplenomegaly and no mass appreciable. Extremities and Musculoskeletal without clubbing cyanosis nor edema, and mobility acceptable. Neurologic grossly intact with symmetric tone and strength and reflex.    Assessment and plan:  1.  Combined pulmonary fibrosis with emphysema-patient had a recent hospitalization with exacerbation.  She also has pneumatoceles.  Doing somewhat better at this juncture.    Recommendations: Continue current medical regime.  Patient to use the home nebulizer on an as-needed basis.    2.  Groundglass opacity-will repeat CT scan of the chest at the 6-month interval.    3.  History of venous thromboembolism status post IVC filter as she has had multiple GI bleeds in the past.    4.  Hoarseness with abnormal thyroid-patient has an appointment to see ENT and will follow-up with them.

## 2024-11-01 ENCOUNTER — NURSE ONLY (OUTPATIENT)
Dept: HEMATOLOGY/ONCOLOGY | Facility: HOSPITAL | Age: 84
End: 2024-11-01
Attending: INTERNAL MEDICINE
Payer: MEDICARE

## 2024-11-01 DIAGNOSIS — Q27.30 AVM (ARTERIOVENOUS MALFORMATION) (HCC): ICD-10-CM

## 2024-11-01 DIAGNOSIS — R53.83 OTHER FATIGUE: ICD-10-CM

## 2024-11-01 DIAGNOSIS — D50.0 IRON DEFICIENCY ANEMIA DUE TO CHRONIC BLOOD LOSS: ICD-10-CM

## 2024-11-01 DIAGNOSIS — K90.9 MALABSORPTION OF IRON (HCC): ICD-10-CM

## 2024-11-01 DIAGNOSIS — E87.6 HYPOKALEMIA: ICD-10-CM

## 2024-11-01 DIAGNOSIS — Z45.2 ENCOUNTER FOR CARE RELATED TO VASCULAR ACCESS PORT: Primary | ICD-10-CM

## 2024-11-01 DIAGNOSIS — N18.31 CHRONIC KIDNEY DISEASE, STAGE 3A (HCC): ICD-10-CM

## 2024-11-01 LAB
ANION GAP SERPL CALC-SCNC: 7 MMOL/L (ref 0–18)
BUN BLD-MCNC: 16 MG/DL (ref 9–23)
BUN/CREAT SERPL: 20.8 (ref 10–20)
CALCIUM BLD-MCNC: 8.8 MG/DL (ref 8.7–10.4)
CHLORIDE SERPL-SCNC: 112 MMOL/L (ref 98–112)
CO2 SERPL-SCNC: 24 MMOL/L (ref 21–32)
CREAT BLD-MCNC: 0.77 MG/DL
EGFRCR SERPLBLD CKD-EPI 2021: 76 ML/MIN/1.73M2 (ref 60–?)
FASTING STATUS PATIENT QL REPORTED: YES
GLUCOSE BLD-MCNC: 63 MG/DL (ref 70–99)
OSMOLALITY SERPL CALC.SUM OF ELEC: 295 MOSM/KG (ref 275–295)
POTASSIUM SERPL-SCNC: 4.1 MMOL/L (ref 3.5–5.1)
SODIUM SERPL-SCNC: 143 MMOL/L (ref 136–145)
T3FREE SERPL-MCNC: 2.7 PG/ML (ref 2.4–4.2)
T4 FREE SERPL-MCNC: 1 NG/DL (ref 0.8–1.7)
TSI SER-ACNC: 0.24 UIU/ML (ref 0.55–4.78)
VIT B12 SERPL-MCNC: 814 PG/ML (ref 211–911)
VIT D+METAB SERPL-MCNC: 13 NG/ML (ref 30–100)

## 2024-11-01 PROCEDURE — 84443 ASSAY THYROID STIM HORMONE: CPT

## 2024-11-01 PROCEDURE — 84481 FREE ASSAY (FT-3): CPT

## 2024-11-01 PROCEDURE — 96372 THER/PROPH/DIAG INJ SC/IM: CPT

## 2024-11-01 PROCEDURE — 36591 DRAW BLOOD OFF VENOUS DEVICE: CPT

## 2024-11-01 PROCEDURE — 82607 VITAMIN B-12: CPT

## 2024-11-01 PROCEDURE — 80048 BASIC METABOLIC PNL TOTAL CA: CPT

## 2024-11-01 PROCEDURE — 84439 ASSAY OF FREE THYROXINE: CPT

## 2024-11-01 PROCEDURE — 82306 VITAMIN D 25 HYDROXY: CPT

## 2024-11-01 PROCEDURE — 36593 DECLOT VASCULAR DEVICE: CPT

## 2024-11-01 RX ORDER — SODIUM CHLORIDE 9 MG/ML
10 INJECTION, SOLUTION INTRAMUSCULAR; INTRAVENOUS; SUBCUTANEOUS ONCE
OUTPATIENT
Start: 2024-11-28

## 2024-11-03 PROBLEM — E55.9 VITAMIN D DEFICIENCY: Status: ACTIVE | Noted: 2024-11-03

## 2024-11-04 NOTE — PROGRESS NOTES
BMP Normal (electrolytes, kidney function),   Thyroid is abnormal showing overactive.  Not sure if this blood work was taken with any B complex or biotin or vitamins in the system.  If worse would stop all vitamins and B complex etc. and wait at least a week or 2 and recheck levels.  B12 level looks okay.  Vitamin D level is very low.  Goal is between 30-60.  Will do vitamin D prescription once every 2 weeks 50,000 units.  Recheck vitamin D level in 3 months.  Orders written.

## 2024-11-05 ENCOUNTER — PATIENT OUTREACH (OUTPATIENT)
Dept: CASE MANAGEMENT | Age: 84
End: 2024-11-05

## 2024-11-05 NOTE — PROGRESS NOTES
[facility] Follow-up Assessment    General:  Assessment completed with: Children    Progress/Care Plan:  Is the patient progressing as planned?: Yes  Care Plan Update: Pt did see Dr. Sorto in office 10/25/24 for TCM--Valsartan increased--dtr monitoring b/p and has improved--unable to relay specific values, during call, but aware to call PCP office with readings. Pt also saw Dr. Parker 10/31/24  New Care Plan: Dtr confirms pt continues with IV iron infusions, as well as specialist appts and PCP Medicare appt, 12/12/24.  Frequency/Follow Up Plan: 14 day follow up, next week     Notes:  Navigator Notes: Dtr reports pt completed Doxycycline, as prescribed by Dr. Parker 10/19/24. Pt using nebulizer, as prescribed, has slight, lingering cough. Dtr denies pt with any fever, chills, shaking, productive cough, headache, vision changes, dizziness, nausea, vomiting, diarrhea, bleeding, irregular heartbeat or fast pulse, loss of vision, speech or strength or coordination in any body part, chest pain or shortness of breath at this time. Dtr aware to monitor pulse ox, once pt awake. Dtr aware when to contact PCP/specialists and when to seek emergency care. No further questions/concerns at this time.       Future Appointments   Date Time Provider Department Center   11/6/2024  2:00 PM Hannah Encinas [place] [id number] [place]   11/13/2024  1:00 PM Vic Thakkar APRN ECADOHENRY  [facility]   11/20/2024 12:40 PM Ashia Goodrich MD [id number] [facility]   11/21/2024  2:30 PM Ladonna Luis APRN EM HEM ONC [facility]   11/29/2024  2:30 PM EM CC LAB1 [facility] CHEMO [facility]   12/12/2024  4:00 PM Heike Sorto MD [id number]  [place] [address]   1/7/2025  2:30 PM Vic Thakkar APRN [id number] [place]   3/25/2025  3:00 PM Chapin Parker MD [facility] [place]   5/6/2025  3:15 PM Chapin Parker MD [facility] [place]

## 2024-11-13 ENCOUNTER — OFFICE VISIT (OUTPATIENT)
Dept: ENDOCRINOLOGY CLINIC | Facility: CLINIC | Age: 84
End: 2024-11-13

## 2024-11-13 ENCOUNTER — TELEPHONE (OUTPATIENT)
Dept: RHEUMATOLOGY | Facility: CLINIC | Age: 84
End: 2024-11-13

## 2024-11-13 VITALS
BODY MASS INDEX: 27 KG/M2 | HEIGHT: 61 IN | HEART RATE: 118 BPM | SYSTOLIC BLOOD PRESSURE: 174 MMHG | DIASTOLIC BLOOD PRESSURE: 86 MMHG | WEIGHT: 143 LBS

## 2024-11-13 DIAGNOSIS — E11.649 TYPE 2 DIABETES MELLITUS WITH HYPOGLYCEMIA WITHOUT COMA, WITH LONG-TERM CURRENT USE OF INSULIN (HCC): Primary | ICD-10-CM

## 2024-11-13 DIAGNOSIS — Z79.4 TYPE 2 DIABETES MELLITUS WITH HYPOGLYCEMIA WITHOUT COMA, WITH LONG-TERM CURRENT USE OF INSULIN (HCC): Primary | ICD-10-CM

## 2024-11-13 LAB
AMB EXT GMI: 9.1 %
GLUCOSE BLOOD: 186
HEMOGLOBIN A1C: 7.6 % (ref 4.3–5.6)
TEST STRIP LOT #: NORMAL NUMERIC

## 2024-11-13 PROCEDURE — 1160F RVW MEDS BY RX/DR IN RCRD: CPT | Performed by: NURSE PRACTITIONER

## 2024-11-13 PROCEDURE — 1111F DSCHRG MED/CURRENT MED MERGE: CPT | Performed by: NURSE PRACTITIONER

## 2024-11-13 PROCEDURE — 3008F BODY MASS INDEX DOCD: CPT | Performed by: NURSE PRACTITIONER

## 2024-11-13 PROCEDURE — 3077F SYST BP >= 140 MM HG: CPT | Performed by: NURSE PRACTITIONER

## 2024-11-13 PROCEDURE — 83036 HEMOGLOBIN GLYCOSYLATED A1C: CPT | Performed by: NURSE PRACTITIONER

## 2024-11-13 PROCEDURE — 1159F MED LIST DOCD IN RCRD: CPT | Performed by: NURSE PRACTITIONER

## 2024-11-13 PROCEDURE — 3079F DIAST BP 80-89 MM HG: CPT | Performed by: NURSE PRACTITIONER

## 2024-11-13 PROCEDURE — 99214 OFFICE O/P EST MOD 30 MIN: CPT | Performed by: NURSE PRACTITIONER

## 2024-11-13 PROCEDURE — 82947 ASSAY GLUCOSE BLOOD QUANT: CPT | Performed by: NURSE PRACTITIONER

## 2024-11-13 NOTE — PATIENT INSTRUCTIONS
A1C: 7.6% today --> previously was 10.3% 10/14/2024  Blood glucose: 186 in clinic today    Medications:   - decrease Glipizide 10 --> 5mg daily with breakfast   - decrease Lantus 24 --> 18 units nightly      Weight:  Wt Readings from Last 6 Encounters:   11/13/24 143 lb (64.9 kg)   10/31/24 144 lb (65.3 kg)   10/25/24 142 lb 3.2 oz (64.5 kg)   10/22/24 147 lb (66.7 kg)   10/21/24 143 lb (64.9 kg)   10/17/24 143 lb 9.6 oz (65.1 kg)     A1C goal:  <7.0%    Blood sugar testing:  Continue with Freestyle van 3 continuous glucometer     Blood sugar targets:  Before breakfast:  (preferably < 110)   2 hours after meals: <180 (preferably <150)     Call for persistent blood sugars < 75 or > 200

## 2024-11-13 NOTE — PROGRESS NOTES
Name: Chester Bautista  YOB: 1940  Report Period: 10/08/2024 - 11/04/2024 (28 days)  Generated: 11/13/2024  % Time CGM Active: 89%      Glucose Statistics and Targets  Average Glucose: 241 mg/dL  Glucose Management Indicator (GMI): 9.1%  Glucose Variability (%CV): 42.3%  Target Range: 70 - 180 mg/dL      Time in Ranges  Very High: >250 mg/dL --- 45%  High: 181 - 250 mg/dL --- 23%  Target Range: 70 - 180 mg/dL --- 28%  Low: 54 - 69 mg/dL --- 4%  Very Low: <54 mg/dL --- 0%

## 2024-11-13 NOTE — TELEPHONE ENCOUNTER
Patient came in for appt with Dr Goodrich. No referral on file. Informed daughter the appt will have to be rescheduled due to not having a referral on file. Rescheduled for 12/03/24. Daughter stated she called the referral dept for referral but it was not in the system. She will call back to have a referral sent over.

## 2024-11-13 NOTE — PROGRESS NOTES
Name: Chester Bautista  Date: 11/13/2024    CHIEF COMPLAINT   Chief Complaint   Patient presents with    Diabetes     Diabetic follow up       HISTORY OF PRESENT ILLNESS   Chester Bautista is a 84 year old female who presents for follow up on diabetes management. Patient is accompanied by her daughter Tommy today.   HbA1C: 7.6% at POC today. Previously was 10.3% on 10/14/2024.   Blood glucose is: 186 in clinic today.   Patient notes that she is feeling better since recent hospitalization due to pneumonia and difficulty breathing. During this hospitalization she required increased prednisone doses, however this dose has returned back to her baseline of 5mg daily.   She has noted lower glucose patterns in the past few days and has been treating with food. Denies any changes to diet. Continues to take diabetes medications as most recently recommended on 10/25.     FAMILY HISTORY OF DIABETES  -paternal grandmother and paternal aunt.   DIABETES HISTORY  Diagnosed: had prediabetes since 2003; diagnosed with T2DM 11/7/21;   Prior HbA, C or glycohemoglobin were 7.2% on 11/7/2021;  7.7% 2/26/2022; 7.3% 5/2/2022; 6.0% 9/1/2022; 6.4% 3/21/2023; 8.1% 6/29/2023; 7.0% 10/18/2023; 9.5% on 2/2/2024; 9.0% 5/21/2024; 10.3% 7/19/2024; 10.3% 8/27/2024; 10.3% on 10/14/2024; 7.6% at POC today;     PREVIOUS MEDICATION FOR DM:  Metformin -d/c'ed due to allergic reaction - swelling to bottom of lip and tongue itching; also vomiting.   - 70/30 insulin: d/c'ed due to lack of glycemic control     CURRENT MEDICATIONS FOR DM:  Glipizide 10mg daily with breakfast  Lantus 24 units daily with dinner     HOME GLUCOSE READINGS:   Freestyle van 3 CGM download reviewed     Report Period: 10/08/2024 - 11/04/2024 (28 days)  Generated: 11/13/2024  % Time CGM Active: 89%        Glucose Statistics and Targets  Average Glucose: 241 mg/dL  Glucose Management Indicator (GMI): 9.1%  Glucose Variability (%CV): 42.3%  Target Range: 70 - 180  mg/dL        Time in Ranges  Very High: >250 mg/dL --- 45%  High: 181 - 250 mg/dL --- 23%  Target Range: 70 - 180 mg/dL --- 28%  Low: 54 - 69 mg/dL --- 4%  Very Low: <54 mg/dL --- 0%    Continuous Glucose Monitoring Interpretation    Chester Bautista has undergone continuous glucose monitoring with the personal Freestyle van 3 continuous glucose monitor.  The blood glucose tracings were evaluated for two weeks prior to office visit. Her blood glucose tracings demonstrated frequent episodes of hypoglycemia occurring overnight and between meals. As a result of her testing her medications were adjusted per below.     HISTORY OF DIABETES COMPLICATIONS:  History of Retinopathy: no  - last eye exam within the last 12 months: yes  History of Neuropathy: no   History of Nephropathy: no     ASSOCIATED COMPLICATIONS:   HTN: yes   Hyperlipidemia: yes   Cardiovascular Disease: no   Peripheral Vascular Disease: no     DIETARY COMPLIANCE:  Good; eating a low carb diet  EXERCISE:   No     Polyuria, polyphagia, polydipsia: no   Paresthesias: no   Blurred vision: no   Recent steroids, illness or infections: yes  - on prednisone for pain - RA; has been taking since 7-9/2021    REVIEW OF SYSTEMS  Constitutional: Negative for: weight change, fever, fatigue, cold/heat intolerance  Eyes: Negative for:  Visual changes, proptosis, blurring  ENT: Negative for:  dysphagia, neck swelling, dysphonia  Respiratory: Negative for: hemoptysis, shortness of breath, cough, or dyspnea.  Cardiovascular: Negative for:  chest pain, chest discomfort, palpitations  GI: Negative for:  abdominal pain, nausea, vomiting, diarrhea, heartburn, constipation  Neurology: Negative for: headache, dizziness, syncope, numbness/tingling, or weakness.   Genito-Urinary: Negative for: dysuria, frequency or hematuria   Hematology/Lymphatics: Negative for: bruising, easy bleeding, lower extremity edema  Skin: Negative for: rash, blister, infection or  ulcers.  Endocrine: Negative for: polyuria, polydipsia. No osteoporosis. No thyroid disease.     MEDICATIONS:     Current Outpatient Medications:     Cholecalciferol (VITAMIN D3) 1.25 MG (26707 UT) Oral Cap, 1 po [id number]., Disp: 2 capsule, Rfl: 4    Potassium Chloride ER 20 MEQ Oral Tab CR, Take 40 mEq by mouth every morning., Disp: 60 tablet, Rfl: 1    valsartan 80 MG Oral Tab, Take 1 tablet (80 mg total) by mouth 2 (two) times daily., Disp: 180 tablet, Rfl: 3    albuterol 108 (90 Base) MCG/ACT Inhalation Aero Soln, TAKE 2 PUFFS BY MOUTH EVERY 4 TO 6 HOURS AS NEEDED, Disp: 6.7 each, Rfl: 5    albuterol (2.5 MG/3ML) 0.083% Inhalation Nebu Soln, Take 3 mL (2.5 mg total) by nebulization every 4 (four) hours as needed for Wheezing or Shortness of Breath., Disp: 30 each, Rfl: 0    glipiZIDE 5 MG Oral Tab, Take 1 tablet (5 mg total) by mouth every morning before breakfast., Disp: 30 tablet, Rfl: 1    Doxycycline Monohydrate 100 MG Oral Cap, Take 1 capsule (100 mg total) by mouth 2 (two) times daily., Disp: 20 capsule, Rfl: 0    loratadine 10 MG Oral Tab, TAKE 1 TABLET BY MOUTH EVERY DAY, Disp: 30 tablet, Rfl: 0    cyclobenzaprine 10 MG Oral Tab, Take 1 tablet (10 mg total) by mouth nightly as needed., Disp: 90 tablet, Rfl: 1    predniSONE 5 MG Oral Tab, Take 1 tablet (5 mg total) by mouth daily., Disp: 90 tablet, Rfl: 0    hydroxychloroquine 200 MG Oral Tab, Take 1 tablet (200 mg total) by mouth daily., Disp: 90 tablet, Rfl: 0    BD PEN NEEDLE KADEN 2ND GEN 32G X 4 MM Does not apply Misc, 1 EACH DAILY. USE DAILY WITH INSULIN, Disp: 100 each, Rfl: 1    insulin glargine (LANTUS SOLOSTAR) 100 UNIT/ML Subcutaneous Solution Pen-injector, Inject 10 Units into the skin daily with dinner., Disp: 3 mL, Rfl: 1    famotidine 40 MG Oral Tab, Take 1 tablet (40 mg total) by mouth 2 (two) times daily., Disp: , Rfl:     montelukast 10 MG Oral Tab, Take 1 tablet (10 mg total) by mouth nightly., Disp: 90 tablet, Rfl: 3    carvedilol 12.5  MG Oral Tab, Take 1 tablet (12.5 mg total) by mouth 2 (two) times daily with meals., Disp: 180 tablet, Rfl: 3    fluticasone-salmeterol (WIXELA INHUB) 250-50 MCG/ACT Inhalation Aerosol Powder, Breath Activated, Inhale 1 puff into the lungs every 12 (twelve) hours., Disp: 1 each, Rfl: 5    Lancets Does not apply Misc, Dx. E11.9. Checks qam., Disp: 50 each, Rfl: 6    Blood Glucose Monitoring Suppl (BLOOD GLUCOSE SYSTEM GILLES) Does not apply Kit, Dx. E11.9. Checks qam., Disp: 1 kit, Rfl: 0    Blood Glucose Monitoring Suppl w/Device Does not apply Kit, Dx. E11.9. Checks qam., Disp: 1 kit, Rfl: 0    Blood Gluc Meter Disp-Strips Does not apply Device, Dx. E11.9. Checks qam., Disp: 100 each, Rfl: 0    gabapentin 100 MG Oral Cap, Take 1 capsule (100 mg total) by mouth nightly., Disp: 90 capsule, Rfl: 3    Accu-Chek FastClix Lancets Does not apply Misc, Check sugar once daily as directed (E11.42), Disp: 100 each, Rfl: 2    rosuvastatin (CRESTOR) 20 MG Oral Tab, Take 1 tablet (20 mg total) by mouth nightly., Disp: 90 tablet, Rfl: 0    glycopyrrolate 1 MG Oral Tab, Take 1 tablet (1 mg total) by mouth 3 (three) times daily., Disp: 90 tablet, Rfl: 1    albuterol 108 (90 Base) MCG/ACT Inhalation Aero Soln, inhale 2 puff by inhalation route  every 4 - 6 hours as needed, Disp: 1 each, Rfl: 5    Blood Glucose Monitoring Suppl (ONETOUCH VERIO) w/Device Does not apply Kit, 1 each daily. Test 1x daily, Disp: 1 kit, Rfl: 0    OneTouch Delica Lancets 33G Does not apply Misc, 4 x daily, Disp: 100 each, Rfl: 1    Blood Glucose Monitoring Suppl (BLOOD GLUCOSE SYSTEM GILLES) Does not apply Kit, DX E 11.9.  Checks every morning., Disp: 1 kit, Rfl: 0    Lancets Does not apply Misc, DX E 11.9.  Checks every morning., Disp: 50 each, Rfl: 11    Blood Glucose Monitoring Suppl w/Device Does not apply Kit, DX E 11.9.  Checks every morning., Disp: 1 kit, Rfl: 0    acetaminophen 500 MG Oral Tab, Take 1 tablet (500 mg total) by mouth daily., Disp: , Rfl:      ALLERGIES:   Allergies   Allergen Reactions    Celebrex [Celecoxib] PALPITATIONS    Penicillins ITCHING and SWELLING    Metformin And Related UNKNOWN    Olmesartan UNKNOWN       SOCIAL HISTORY:   Social History     Socioeconomic History    Marital status:    Tobacco Use    Smoking status: Former     Current packs/day: 0.00     Types: Cigarettes     Quit date:      Years since quittin.8     Passive exposure: Never    Smokeless tobacco: Never    Tobacco comments:     [id number]   Vaping Use    Vaping status: Never Used   Substance and Sexual Activity    Alcohol use: Never    Drug use: Never     PAST MEDICAL HISTORY:   Past Medical History:    Anxiety state    Cancer (HCC)    breast cancer 2018    Chronic obstructive pulmonary disease, unspecified (HCC)    Chronic obstructive pulmonary disease, unspecified (HCC)    COPD (chronic obstructive pulmonary disease) (HCC)    Diabetes (HCC)    Diabetes mellitus (HCC)    Essential hypertension    Exposure to medical diagnostic radiation    High blood pressure    High cholesterol    History of blood transfusion    low hemoglobin    Osteoarthritis    PONV (postoperative nausea and vomiting)    Pulmonary embolism (HCC)    Pulmonary emphysema (HCC)    Rheumatoid arthritis (HCC)       PAST SURGICAL HISTORY:   Past Surgical History:   Procedure Laterality Date    Cataract extraction w/  intraocular lens implant Bilateral 2017    Dr in [facility]     Colonoscopy      Colonoscopy N/A 2022    Procedure: COLONOSCOPY;  Surgeon: Mikey Garcia MD;  Location: [facility] ENDOSCOPY    Colonoscopy N/A 06/15/2023    Procedure: COLONOSCOPY;  Surgeon: Mikey Garcia MD;  Location: [facility] ENDOSCOPY    Correct bunion,simple      right foot      Egd  2023    Dr. Garcia; Duodenal AVM's x4 cauterized    Hysterectomy      , no abnormal Paps, due to heavy bleeding with fibroids.  Not sure if it had bilateral salpingo-oophorectomy.    Ir aneurysm repairm       Computer assisted volumetric craniofomy with clipping of anterior cerebral artery.    Lumpectomy right      Radiation right      Rotator cuff repair      1993    Total knee replacement Right 2010    Total knee replacement Left 07/02/2015    Transoral incisionless fundoplication - internal  01/25/2012 Fredy fundoplication at [facility] Dr. Fournier.    Fredy fundoplication at [facility] Dr. Fournier.    Trigger finger release      left hand  2005    Yag capsulotomy - ou - both eyes Bilateral 09/2021    done in [place]       PHYSICAL EXAM:   Vitals:    11/13/24 1312   BP: (!) 174/86   BP Location: Right arm   Patient Position: Sitting   Cuff Size: adult   Pulse: 118   Weight: 143 lb (64.9 kg)   Height: 5' 1\" (1.549 m)     BMI:   Body mass index is 27.21 kg/m².    General Appearance:  alert, well developed, in no acute distress  Nutritional:  no extreme weight gain or loss  Head: Atraumatic  Eyes:  normal conjunctivae, sclera., normal sclera and normal pupils  Throat/Neck: normal sound to voice. Normal hearing, normal speech  Back: no kyphosis  Respiratory:  Speaking in full sentences, non-labored. no increased work of breathing, no audible wheezing    Skin:  normal moisture and skin texture, no visible lesions  Hair and nails: normal scalp hair  Hematologic:  no excessive bruising  Neuro: motor grossly intact, moving all extremities without difficulty  Psychiatric:  oriented to time, self, and place  Extremities: no obvious extremity swelling, no lesions    LABS: Pertinent labs reviewed    ASSESSMENT/PLAN:    -Reviewed with patient the pathogenesis of diabetes, clinical significance of A1c, and common complications such as: microvascular, macrovascular and diabetic ketoacidosis. Patient verbalizes understanding of the importance of glycemic control and the goals of therapy.   Per ADA 2024 guidelines, it is recommended that older adults (age 65 and above) who are healthy with few coexisting  chronic illnesses, intact cognitive and functional status, should have A1C goal of <7.5%, fasting or preprandial glucose of  and bedtime glucose of .     1.Type 2 Diabetes Mellitus, uncontrolled  -LAB DATA  HbA,C: 7.6% today   a) Medications  - decrease Glipizide 10 --> 5mg daily with breakfast   - decrease Lantus 24 --> 18 units nightly     - patient educated on increased risk of hypoglycemia and importance that she notifies me right away even after 1 episode of glucose readings <80.   - will follow up and review BG readings again on   - discussed to continue to follow a low carb diet   - discussed to continue to stay active as currently doing   - reviewed rule of 15s in treating hypoglycemia   -reviewed target goal BG readings and A1C  -reviewed when to call and notify me of abnormal BG readings.     b) No nephropathy: GFR: 76 on 2024 and urine MA: 994.7 on 10/25/2024  c) UTD with optho - last apt with Dr. Melo was 2024  D)foot exam: abnormal on 2/15/2024  e) cont using freestyle van 3 cgm   f) Life style changes reviewed    2.dyslipidemia   -LDL: 57 and Tri on 2024  -Atorvastatin 80mg at bedtime  - repeat lipid panel in 2025      RTC in 2 months    Patient instructed to call sooner if they develop Blood glucose readings <75 and/or if they have readings persistently >200.     The risks and benefits of my recommendations were discussed with the patient today. questions were also answered to the best of my knowledge. Patient verbalizes understanding of these issues and agrees to the plan.    2024  BILL Swain

## 2024-11-14 ENCOUNTER — TELEPHONE (OUTPATIENT)
Dept: ENDOCRINOLOGY CLINIC | Facility: CLINIC | Age: 84
End: 2024-11-14

## 2024-11-14 DIAGNOSIS — N17.9 AKI (ACUTE KIDNEY INJURY) (HCC): Primary | ICD-10-CM

## 2024-11-14 DIAGNOSIS — E83.42 HYPOMAGNESEMIA: ICD-10-CM

## 2024-11-14 RX ORDER — GLIPIZIDE 5 MG/1
5 TABLET ORAL
Qty: 90 TABLET | Refills: 0 | Status: SHIPPED | OUTPATIENT
Start: 2024-11-14

## 2024-11-14 NOTE — TELEPHONE ENCOUNTER
Endocrine Refill protocol for oral and injectable diabetic medications    Protocol Criteria:  PASSED  Reason: N/A    If all below requirements are met, send a 90-day supply with 1 refill per provider protocol.    Verify appointment with Endocrinology completed in the last 6 months or scheduled in the next 3 months.  Verify A1C has been completed within the last 6 months and is below 8.5%     Last completed office visit: 11/13/2024 Vic Thakkar APRN   Next scheduled Follow up:   Future Appointments   Date Time Provider Department Center   1/7/2025  2:30 PM Vic Thakkar APRN [facility]   1/16/2025  1:00 PM Vic Thakkar APRN [facility]      Last A1c result: Last A1c value was 7.6% done 11/13/2024.

## 2024-11-14 NOTE — TELEPHONE ENCOUNTER
Medication Quantity Refills Start End   glipiZIDE 5 MG Oral Tab 30 tablet 1 10/21/2024 --   Sig:   Take 1 tablet (5 mg total) by mouth every morning before breakfast.     REQUESTING 90 DAY SUPPLY

## 2024-11-15 NOTE — TELEPHONE ENCOUNTER
Dr Puente,    Reports BG of 54 this morning    Today was the first day she decreased to 5 mg glipizide daily (used to be 10 mg)    Currently     Took lantus 18 units 30 min ago    Per daughter Caren had suggested to go to 16 units if she dropped <70    Hypoglycemia    Onset of hypoglycemia: Today    BG levels  11/14 BF 54 Lunch 170    Joselyn sent via "[employer]     Pattern of hypoglycemia     Symptoms No    Acute illness: No    Hypoglycemia Mx/Rule of 15: took honey     Change in Diet No    List Medications/Compliance:      Glipizide  5mg daily with breakfast    Lantus 18 units nightly

## 2024-11-15 NOTE — TELEPHONE ENCOUNTER
Requested Prescriptions     Pending Prescriptions Disp Refills    HYDROXYCHLOROQUINE 200 MG Oral Tab [Pharmacy Med Name: HYDROXYCHLOROQUINE 200 MG TAB] 90 tablet 0     Sig: TAKE 1 TABLET BY MOUTH EVERY DAY     LOV:  8/10/23  Future Appointments   Date Time Provider Department Center   11/21/2024  2:30 PM Ladonna Luis APRN [facility] HEM ONC EMO   11/29/2024  2:30 PM EM [facility] LAB1 [facility] CHEMO EMO   12/3/2024  4:00 PM Ashia Goodrich MD ECCFHRHEUM [facility]   12/12/2024  4:00 PM Heike Sorto MD [id number] [facility] [place] [facility]   12/16/2024  2:00 PM Hannah Encinas MD [id number] [facility]   1/7/2025  2:30 PM Vic Thakkar APRN [facility]   1/16/2025  1:00 PM Vic Thakkar APRN [facility]   3/25/2025  3:00 PM Chapin Parker MD ECWMOPSTEFANI [facility]   5/6/2025  3:15 PM Chapin Parker MD ECWMOPULM [facility]     Labs:   Component      Latest Ref Rng 11/1/2024   Glucose      70 - 99 mg/dL 63 (L)    Sodium      136 - 145 mmol/L 143    Potassium      3.5 - 5.1 mmol/L 4.1    Chloride      98 - 112 mmol/L 112    Carbon Dioxide, Total      21.0 - 32.0 mmol/L 24.0    ANION GAP      0 - 18 mmol/L 7    BUN      9 - 23 mg/dL 16    CREATININE      0.55 - 1.02 mg/dL 0.77    BUN/CREATININE RATIO      10.0 - 20.0  20.8 (H)    CALCIUM      8.7 - 10.4 mg/dL 8.8    CALCULATED OSMOLALITY      275 - 295 mOsm/kg 295    EGFR      >=60 mL/min/1.73m2 76    Patient Fasting for BMP? Yes       Component      Latest Ref Rng 10/22/2024   WBC      4.0 - 11.0 x10(3) uL 13.9 (H)    RBC      3.80 - 5.30 x10(6)uL 3.93    Hemoglobin      12.0 - 16.0 g/dL 10.0 (L)    Hematocrit      35.0 - 48.0 % 34.2 (L)    MCV      80.0 - 100.0 fL 87.0    MCH      26.0 - 34.0 pg 25.4 (L)    MCHC      31.0 - 37.0 g/dL 29.2 (L)    RDW-SD      35.1 - 46.3 fL 56.0 (H)    RDW      11.0 - 15.0 % 18.0 (H)    Platelet Count      150.0 - 450.0 10(3)uL 296.0    Prelim Neutrophil Abs      1.50 - 7.70 x10 (3) uL 11.89 (H)    Neutrophils Absolute      1.50 - 7.70  x10(3) uL 11.89 (H)    Lymphocytes Absolute      1.00 - 4.00 x10(3) uL 0.98 (L)    Monocytes Absolute      0.10 - 1.00 x10(3) uL 0.82    Eosinophils Absolute      0.00 - 0.70 x10(3) uL 0.07    Basophils Absolute      0.00 - 0.20 x10(3) uL 0.06    Immature Granulocyte Absolute      0.00 - 1.00 x10(3) uL 0.12    Neutrophils %      % 85.3    Lymphocytes %      % 7.0    Monocytes %      % 5.9    Eosinophils %      % 0.5    Basophils %      % 0.4    Immature Granulocyte %      % 0.9       Legend:  (H) High  (L) Low  Legend:  (L) Low  (H) High  ASSESSMENT/PLAN:      Seropositive RA- stable   - Currently on hydroxychloroquine 200 mg daily and prednisone 5 mg daily  - At times she will have pain in her hands and wrists but not severe.  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  Getting IV iron infusions     Peripheral neuropathy- stable, resolved   - Was seen by Dr. 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  Wondering if she needs to go on treatment for the positive hepatitis B     Pt will f/u in 3 mos      Ashia Goodrich MD  8/10/2023  12:40 PM

## 2024-11-15 NOTE — TELEPHONE ENCOUNTER
Hypoglycemia is fairly severe.  Decrease Lantus to 14 units subcutaneous daily.  Did she double check low Glucose with fingerstick? Thanks.

## 2024-11-15 NOTE — TELEPHONE ENCOUNTER
Called daughter and reported instructions below. Denies double checking with the glucometer. Daughter to check with glucometer with abnormal readings.    RN advised daughter to eat a protein snack before bedtime since she took the 18 units of lantus today.     RN reviewed rule of 15 with daughter    Daughter to call with an update next week.

## 2024-11-18 ENCOUNTER — OFFICE VISIT (OUTPATIENT)
Dept: INTERNAL MEDICINE CLINIC | Facility: CLINIC | Age: 84
End: 2024-11-18

## 2024-11-18 ENCOUNTER — NURSE TRIAGE (OUTPATIENT)
Dept: INTERNAL MEDICINE CLINIC | Facility: CLINIC | Age: 84
End: 2024-11-18

## 2024-11-18 VITALS
TEMPERATURE: 98 F | WEIGHT: 144 LBS | OXYGEN SATURATION: 92 % | DIASTOLIC BLOOD PRESSURE: 71 MMHG | HEIGHT: 61 IN | HEART RATE: 112 BPM | BODY MASS INDEX: 27.19 KG/M2 | SYSTOLIC BLOOD PRESSURE: 134 MMHG

## 2024-11-18 DIAGNOSIS — N30.00 ACUTE CYSTITIS WITHOUT HEMATURIA: Primary | ICD-10-CM

## 2024-11-18 LAB
BILIRUBIN: NEGATIVE
GLUCOSE (URINE DIPSTICK): NEGATIVE MG/DL
KETONES (URINE DIPSTICK): NEGATIVE MG/DL
MULTISTIX LOT#: ABNORMAL NUMERIC
NITRITE, URINE: POSITIVE
PH, URINE: 7 (ref 4.5–8)
PROTEIN (URINE DIPSTICK): >=300 MG/DL
SPECIFIC GRAVITY: 1.01 (ref 1–1.03)
URINE-COLOR: YELLOW
UROBILINOGEN,SEMI-QN: 0.2 MG/DL (ref 0–1.9)

## 2024-11-18 PROCEDURE — 99213 OFFICE O/P EST LOW 20 MIN: CPT | Performed by: INTERNAL MEDICINE

## 2024-11-18 PROCEDURE — 1125F AMNT PAIN NOTED PAIN PRSNT: CPT | Performed by: INTERNAL MEDICINE

## 2024-11-18 PROCEDURE — 3078F DIAST BP <80 MM HG: CPT | Performed by: INTERNAL MEDICINE

## 2024-11-18 PROCEDURE — 3075F SYST BP GE 130 - 139MM HG: CPT | Performed by: INTERNAL MEDICINE

## 2024-11-18 PROCEDURE — 81003 URINALYSIS AUTO W/O SCOPE: CPT | Performed by: INTERNAL MEDICINE

## 2024-11-18 PROCEDURE — 1159F MED LIST DOCD IN RCRD: CPT | Performed by: INTERNAL MEDICINE

## 2024-11-18 PROCEDURE — 3008F BODY MASS INDEX DOCD: CPT | Performed by: INTERNAL MEDICINE

## 2024-11-18 PROCEDURE — 1111F DSCHRG MED/CURRENT MED MERGE: CPT | Performed by: INTERNAL MEDICINE

## 2024-11-18 PROCEDURE — 1160F RVW MEDS BY RX/DR IN RCRD: CPT | Performed by: INTERNAL MEDICINE

## 2024-11-18 RX ORDER — HYDROXYCHLOROQUINE SULFATE 200 MG/1
200 TABLET, FILM COATED ORAL DAILY
Qty: 90 TABLET | Refills: 0 | Status: SHIPPED | OUTPATIENT
Start: 2024-11-18

## 2024-11-18 RX ORDER — NITROFURANTOIN 25; 75 MG/1; MG/1
100 CAPSULE ORAL 2 TIMES DAILY
Qty: 14 CAPSULE | Refills: 0 | Status: SHIPPED | OUTPATIENT
Start: 2024-11-18

## 2024-11-18 NOTE — TELEPHONE ENCOUNTER
Called pt and spoke with daughter per MANUEL. Advised to decrease Lantus to 10 units and keep glipizide 5 mg BF. Pt daughter verbalized understanding. Advised pt daughter to call office if BG continue to be low.

## 2024-11-18 NOTE — TELEPHONE ENCOUNTER
Action Requested: Summary for Provider     []  Critical Lab, Recommendations Needed  [x] Need Additional Advice  []   FYI    [x]   Need Orders  [] Need Medications Sent to Pharmacy  []  Other     SUMMARY: Disposition per protocol  is to be seen in office today-No appointments available with Dr Sorto today.   Vaginal itching and burning with urination started Friday.    No improvement  noted today after using  over the counter Monistat on Saturday.  She denies  vaginal discharge or other symptoms. She has recently switched brands of incontinence pads.  She is at her  normal activity level without  increased confusion.    Patient's daughter asks if Dr Sorto would order a urinalysis and antibiotics if needed?  Patient lives far from office.    Dr Sorto, Please advise.    Reason for call: Urinary Symptoms  Onset: Friday  Patient's daughter Brea calling,verified name and date of birth. She has release of information permission. Patient developed vaginal itching and burning with urination that did not respond to over the counter monistat. Reviewed care advice including continue to push fluids, call back for increasing discomfort or new  symptoms. Patient verbalizes understanding and agrees to plan of care.      Reason for Disposition   Age > 50 years    Protocols used: Urination Pain - Female-A-OH

## 2024-11-18 NOTE — PROGRESS NOTES
Subjective:     Patient ID: Chester Bautista is a 84 year old female.    Urinary  Associated symptoms include urinary symptoms.       History/Other: She came in today complaining of UTI symptoms .  According to the patient this is ongoing for almost 5 days.  She does have burning at the end of the urination.  She denies any urinary urgency or frequency.  She denies any fever or chills, denies any back pain, denies any abdominal pain  Review of Systems   Constitutional: Negative.    HENT: Negative.     Eyes: Negative.    Respiratory: Negative.     Cardiovascular: Negative.    Gastrointestinal: Negative.    Genitourinary:         Burning during urination   Musculoskeletal: Negative.    Neurological: Negative.    Hematological: Negative.    Psychiatric/Behavioral: Negative.       Current Outpatient Medications   Medication Sig Dispense Refill    HYDROXYCHLOROQUINE 200 MG Oral Tab TAKE 1 TABLET BY MOUTH EVERY DAY 90 tablet 0    glipiZIDE 5 MG Oral Tab Take 1 tablet (5 mg total) by mouth every morning before breakfast. 90 tablet 0    Cholecalciferol (VITAMIN D3) 1.25 MG (40999 UT) Oral Cap 1 po [id number]. 2 capsule 4    Potassium Chloride ER 20 MEQ Oral Tab CR Take 40 mEq by mouth every morning. 60 tablet 1    valsartan 80 MG Oral Tab Take 1 tablet (80 mg total) by mouth 2 (two) times daily. 180 tablet 3    albuterol 108 (90 Base) MCG/ACT Inhalation Aero Soln TAKE 2 PUFFS BY MOUTH EVERY 4 TO 6 HOURS AS NEEDED 6.7 each 5    albuterol (2.5 MG/3ML) 0.083% Inhalation Nebu Soln Take 3 mL (2.5 mg total) by nebulization every 4 (four) hours as needed for Wheezing or Shortness of Breath. 30 each 0    loratadine 10 MG Oral Tab TAKE 1 TABLET BY MOUTH EVERY DAY 30 tablet 0    cyclobenzaprine 10 MG Oral Tab Take 1 tablet (10 mg total) by mouth nightly as needed. 90 tablet 1    predniSONE 5 MG Oral Tab Take 1 tablet (5 mg total) by mouth daily. 90 tablet 0    BD PEN NEEDLE KADEN 2ND GEN 32G X 4 MM Does not apply Misc 1 EACH  DAILY. USE DAILY WITH INSULIN 100 each 1    insulin glargine (LANTUS SOLOSTAR) 100 UNIT/ML Subcutaneous Solution Pen-injector Inject 10 Units into the skin daily with dinner. 3 mL 1    montelukast 10 MG Oral Tab Take 1 tablet (10 mg total) by mouth nightly. 90 tablet 3    carvedilol 12.5 MG Oral Tab Take 1 tablet (12.5 mg total) by mouth 2 (two) times daily with meals. 180 tablet 3    fluticasone-salmeterol (WIXELA INHUB) 250-50 MCG/ACT Inhalation Aerosol Powder, Breath Activated Inhale 1 puff into the lungs every 12 (twelve) hours. 1 each 5    Blood Glucose Monitoring Suppl (BLOOD GLUCOSE SYSTEM GILLES) Does not apply Kit Dx. E11.9. Checks qam. 1 kit 0    Blood Glucose Monitoring Suppl w/Device Does not apply Kit Dx. E11.9. Checks qam. 1 kit 0    Blood Gluc Meter Disp-Strips Does not apply Device Dx. E11.9. Checks qam. 100 each 0    gabapentin 100 MG Oral Cap Take 1 capsule (100 mg total) by mouth nightly. 90 capsule 3    Accu-Chek FastClix Lancets Does not apply Misc Check sugar once daily as directed (E11.42) 100 each 2    rosuvastatin (CRESTOR) 20 MG Oral Tab Take 1 tablet (20 mg total) by mouth nightly. 90 tablet 0    Blood Glucose Monitoring Suppl (ONETOUCH VERIO) w/Device Does not apply Kit 1 each daily. Test 1x daily 1 kit 0    OneTouch Delica Lancets 33G Does not apply Misc 4 x daily 100 each 1    acetaminophen 500 MG Oral Tab Take 1 tablet (500 mg total) by mouth daily.      Doxycycline Monohydrate 100 MG Oral Cap Take 1 capsule (100 mg total) by mouth 2 (two) times daily. (Patient not taking: Reported on 11/18/2024) 20 capsule 0    famotidine 40 MG Oral Tab Take 1 tablet (40 mg total) by mouth 2 (two) times daily. (Patient not taking: Reported on 11/18/2024)      glycopyrrolate 1 MG Oral Tab Take 1 tablet (1 mg total) by mouth 3 (three) times daily. (Patient not taking: Reported on 11/18/2024) 90 tablet 1     Allergies:Allergies[1]    Past Medical History:    Anxiety state    Cancer (HCC)    breast cancer  2018    Chronic obstructive pulmonary disease, unspecified (HCC)    Chronic obstructive pulmonary disease, unspecified (HCC)    COPD (chronic obstructive pulmonary disease) (HCC)    Diabetes (HCC)    Diabetes mellitus (HCC)    Essential hypertension    Exposure to medical diagnostic radiation    High blood pressure    High cholesterol    History of blood transfusion    low hemoglobin    Osteoarthritis    PONV (postoperative nausea and vomiting)    Pulmonary embolism (HCC)    Pulmonary emphysema (HCC)    Rheumatoid arthritis (HCC)      Past Surgical History:   Procedure Laterality Date    Cataract extraction w/  intraocular lens implant Bilateral 2017    Dr in [facility]     Colonoscopy      Colonoscopy N/A 07/21/2022    Procedure: COLONOSCOPY;  Surgeon: Mikey Garcia MD;  Location: [facility] ENDOSCOPY    Colonoscopy N/A 06/15/2023    Procedure: COLONOSCOPY;  Surgeon: Mikey Garcia MD;  Location: [facility] ENDOSCOPY    Correct bunion,simple      right foot  1993    Egd  12/21/2023    Dr. Garcia; Duodenal AVM's x4 cauterized    Hysterectomy      1972, no abnormal Paps, due to heavy bleeding with fibroids.  Not sure if it had bilateral salpingo-oophorectomy.    Ir aneurysm repairm  2010    Computer assisted volumetric craniofomy with clipping of anterior cerebral artery.    Lumpectomy right      Radiation right      Rotator cuff repair      1993    Total knee replacement Right 2010    Total knee replacement Left 07/02/2015    Transoral incisionless fundoplication - internal  01/25/2012 Fredy fundoplication at [facility] Dr. Fournier.    Fredy fundoplication at [facility] Dr. Fournier.    Trigger finger release      left hand  2005    Yag capsulotomy - ou - both eyes Bilateral 09/2021    done in [place]      Family History   Problem Relation Age of Onset    Heart Surgery Mother         Leaky valve at 41 y/o.     Prostate Cancer Brother     Cancer Brother     Diabetes Maternal Grandmother     Diabetes  Paternal Aunt     Breast Cancer Self 78    Breast Cancer Niece         before 50    Macular degeneration Neg     Glaucoma Neg       Social History:   Social History     Socioeconomic History    Marital status:    Tobacco Use    Smoking status: Former     Current packs/day: 0.00     Types: Cigarettes     Quit date:      Years since quittin.8     Passive exposure: Never    Smokeless tobacco: Never    Tobacco comments:     [id number]   Vaping Use    Vaping status: Never Used   Substance and Sexual Activity    Alcohol use: Never    Drug use: Never   Social History Narrative    Just moved in from [place].   passed away and that is why moved from [place] to be with daughter who lives in [place].  Currently lives with daughter.     Social Drivers of Health     Financial Resource Strain: Low Risk  (10/22/2024)    Financial Resource Strain     Difficulty of Paying Living Expenses: Not very hard     Med Affordability: No   Food Insecurity: No Food Insecurity (10/17/2024)    Food Insecurity     Food Insecurity: Never true   Transportation Needs: No Transportation Needs (10/22/2024)    Transportation Needs     Lack of Transportation: No   Housing Stability: Low Risk  (10/17/2024)    Housing Stability     Housing Instability: No        Objective:   Physical Exam  Vitals and nursing note reviewed.   Constitutional:       Appearance: Normal appearance.   HENT:      Head: Normocephalic and atraumatic.   Cardiovascular:      Rate and Rhythm: Normal rate and regular rhythm.      Pulses: Normal pulses.      Heart sounds: Normal heart sounds.   Pulmonary:      Effort: Pulmonary effort is normal.      Breath sounds: Normal breath sounds.   Abdominal:      Palpations: Abdomen is soft.   Musculoskeletal:         General: Normal range of motion.      Cervical back: Normal range of motion and neck supple.   Skin:     General: Skin is warm.   Neurological:      Mental Status: She is alert. Mental status is  at baseline.         Assessment & Plan:   1. Acute cystitis without hematuria    UA noted will send urine for culture, advised to drink more fluids, always wipe front to back never back to front ,will follow-up urine culture    No orders of the defined types were placed in this encounter.      Meds This Visit:  Requested Prescriptions      No prescriptions requested or ordered in this encounter       Imaging & Referrals:  None            [1]   Allergies  Allergen Reactions    Celebrex [Celecoxib] PALPITATIONS    Penicillins ITCHING and SWELLING    Metformin And Related UNKNOWN    Olmesartan UNKNOWN

## 2024-11-18 NOTE — TELEPHONE ENCOUNTER
Endo staff: please call call patient to notify her that I have reviewed her glucose patterns via [facility] portal and it seems that she continues to have lower glucose patterns 50s-60s overnight.     Please ask patient to decrease her Lantus dose further to 10 units daily. Continue with glipizide 5mg daily with breakfast.       Thank you!

## 2024-11-18 NOTE — TELEPHONE ENCOUNTER
Dr Sorto: [facility].     Called patient's daughter Brea,verified name and date of birth.  Reviewed Dr Sorto's recommendations for an office visit from her 11/18/24 note below.  Brea accepts appointment for today:  Future Appointments   Date Time Provider Department Center   11/18/2024  3:00 PM Alana Flores MD [id number]  [place] [facility]

## 2024-11-19 ENCOUNTER — TELEPHONE (OUTPATIENT)
Dept: INTERNAL MEDICINE CLINIC | Facility: CLINIC | Age: 84
End: 2024-11-19

## 2024-11-19 DIAGNOSIS — M06.9 RHEUMATOID ARTHRITIS, INVOLVING UNSPECIFIED SITE, UNSPECIFIED WHETHER RHEUMATOID FACTOR PRESENT (HCC): Primary | ICD-10-CM

## 2024-11-19 RX ORDER — TORSEMIDE 20 MG/1
20 TABLET ORAL DAILY
Qty: 90 TABLET | Refills: 3 | OUTPATIENT
Start: 2024-11-19

## 2024-11-19 NOTE — TELEPHONE ENCOUNTER
Spoke to patient's daughter Brea to relay message below - daughter stated understanding and will have patient decrease lantus to 5 units daily and continue 5mg glipizide daily  Daughter stated understanding to contact clinic if BG <80

## 2024-11-19 NOTE — TELEPHONE ENCOUNTER
Update noted and glucose patterns noted.     Please ask patient to reduce her insulin dose to 5 units daily.     Continue with the same glipizide dose for now.     Thank you!

## 2024-11-19 NOTE — TELEPHONE ENCOUNTER
Per daughter of patient, patient needs a referral to go and see Dr Goodrich Rheumatology, patient has an appointment on 12/03/2024.

## 2024-11-19 NOTE — TELEPHONE ENCOUNTER
Patients daughter Brea calling to speak with nurse. Patients blood sugar reading was 54 this morning. I will try nurse line or call at [phone],thanks.

## 2024-11-19 NOTE — TELEPHONE ENCOUNTER
Caren KHAN,    Daughter called to report low blood sugar this morning. Denies double checking with the glucometer.     RN asked if she injected the decreased dose of 10 units yesterday. Per daughter, patient confirmed with her she injected 10 units yesterday but said her mom is forgetful and not sure if accurate. Daughter doesn't know what she injects, she was not at home with patient during call.     Per daughter she will try and be there when patient injects her insulin.

## 2024-11-19 NOTE — TELEPHONE ENCOUNTER
Dr. Sorto,     Daughter requesting patient see Dr. Goodrich for rheumatoid arthritis.    Pended referral please review diagnosis and sign off if you agree.    Thank you.  Vandana Nicole  [facility]

## 2024-11-21 ENCOUNTER — APPOINTMENT (OUTPATIENT)
Dept: HEMATOLOGY/ONCOLOGY | Facility: HOSPITAL | Age: 84
End: 2024-11-21
Attending: INTERNAL MEDICINE
Payer: MEDICARE

## 2024-11-22 ENCOUNTER — OFFICE VISIT (OUTPATIENT)
Dept: HEMATOLOGY/ONCOLOGY | Facility: HOSPITAL | Age: 84
End: 2024-11-22
Attending: INTERNAL MEDICINE

## 2024-11-22 ENCOUNTER — NURSE ONLY (OUTPATIENT)
Dept: HEMATOLOGY/ONCOLOGY | Facility: HOSPITAL | Age: 84
End: 2024-11-22
Attending: INTERNAL MEDICINE
Payer: MEDICARE

## 2024-11-22 VITALS
RESPIRATION RATE: 16 BRPM | OXYGEN SATURATION: 96 % | HEART RATE: 92 BPM | BODY MASS INDEX: 27.19 KG/M2 | WEIGHT: 144 LBS | TEMPERATURE: 98 F | SYSTOLIC BLOOD PRESSURE: 176 MMHG | HEIGHT: 61 IN | DIASTOLIC BLOOD PRESSURE: 77 MMHG

## 2024-11-22 DIAGNOSIS — K90.9 MALABSORPTION OF IRON (HCC): ICD-10-CM

## 2024-11-22 DIAGNOSIS — J43.9 PULMONARY EMPHYSEMA, UNSPECIFIED EMPHYSEMA TYPE (HCC): Primary | ICD-10-CM

## 2024-11-22 DIAGNOSIS — Z95.828 PRESENCE OF IVC FILTER: ICD-10-CM

## 2024-11-22 DIAGNOSIS — D72.829 LEUKOCYTOSIS, UNSPECIFIED TYPE: ICD-10-CM

## 2024-11-22 DIAGNOSIS — D50.0 IRON DEFICIENCY ANEMIA DUE TO CHRONIC BLOOD LOSS: ICD-10-CM

## 2024-11-22 DIAGNOSIS — I10 PRIMARY HYPERTENSION: ICD-10-CM

## 2024-11-22 DIAGNOSIS — K31.811 AVM (ARTERIOVENOUS MALFORMATION) OF DUODENUM, ACQUIRED WITH HEMORRHAGE: ICD-10-CM

## 2024-11-22 DIAGNOSIS — Q27.30 AVM (ARTERIOVENOUS MALFORMATION) (HCC): ICD-10-CM

## 2024-11-22 DIAGNOSIS — D62 ACUTE ON CHRONIC BLOOD LOSS ANEMIA: ICD-10-CM

## 2024-11-22 LAB
BASOPHILS # BLD AUTO: 0.07 X10(3) UL (ref 0–0.2)
BASOPHILS NFR BLD AUTO: 0.6 %
DEPRECATED HBV CORE AB SER IA-ACNC: 246 NG/ML
DEPRECATED RDW RBC AUTO: 64.1 FL (ref 35.1–46.3)
EOSINOPHIL # BLD AUTO: 0.12 X10(3) UL (ref 0–0.7)
EOSINOPHIL NFR BLD AUTO: 1 %
ERYTHROCYTE [DISTWIDTH] IN BLOOD BY AUTOMATED COUNT: 20 % (ref 11–15)
HCT VFR BLD AUTO: 31.3 %
HGB BLD-MCNC: 9.5 G/DL
IMM GRANULOCYTES # BLD AUTO: 0.17 X10(3) UL (ref 0–1)
IMM GRANULOCYTES NFR BLD: 1.5 %
IRON SATN MFR SERPL: 12 %
IRON SERPL-MCNC: 32 UG/DL
LYMPHOCYTES # BLD AUTO: 0.86 X10(3) UL (ref 1–4)
LYMPHOCYTES NFR BLD AUTO: 7.4 %
MCH RBC QN AUTO: 26.8 PG (ref 26–34)
MCHC RBC AUTO-ENTMCNC: 30.4 G/DL (ref 31–37)
MCV RBC AUTO: 88.2 FL
MONOCYTES # BLD AUTO: 0.85 X10(3) UL (ref 0.1–1)
MONOCYTES NFR BLD AUTO: 7.3 %
NEUTROPHILS # BLD AUTO: 9.59 X10 (3) UL (ref 1.5–7.7)
NEUTROPHILS # BLD AUTO: 9.59 X10(3) UL (ref 1.5–7.7)
NEUTROPHILS NFR BLD AUTO: 82.2 %
PLATELET # BLD AUTO: 241 10(3)UL (ref 150–450)
RBC # BLD AUTO: 3.55 X10(6)UL
TIBC SERPL-MCNC: 262 UG/DL (ref 250–425)
TRANSFERRIN SERPL-MCNC: 176 MG/DL (ref 250–380)
WBC # BLD AUTO: 11.7 X10(3) UL (ref 4–11)

## 2024-11-22 PROCEDURE — 82728 ASSAY OF FERRITIN: CPT

## 2024-11-22 PROCEDURE — 84466 ASSAY OF TRANSFERRIN: CPT

## 2024-11-22 PROCEDURE — 85025 COMPLETE CBC W/AUTO DIFF WBC: CPT

## 2024-11-22 PROCEDURE — 83540 ASSAY OF IRON: CPT

## 2024-11-22 PROCEDURE — 99213 OFFICE O/P EST LOW 20 MIN: CPT

## 2024-11-22 PROCEDURE — 36591 DRAW BLOOD OFF VENOUS DEVICE: CPT

## 2024-11-22 RX ORDER — FAMOTIDINE 20 MG/1
20 TABLET, FILM COATED ORAL 2 TIMES DAILY
COMMUNITY

## 2024-11-22 NOTE — PROGRESS NOTES
Cancer Center Progress Note    Patient Name: Chester Bautista   YOB: 1940   Medical Record Number: [id number]   [facility] [id number]  Date: 11/22/24    Chief Complaint:  BUZZ, hx of PE      Oncology History:  84 year old with chronic iron deficiency anemia in the setting of bleeding AVMs s/p repeat endoscopic treatment, Iv iron treatment and recent RBC transfusion for hgb of 6 on 5/2022. EGD/CLN Dr Garcia 7/2022 noted for 2 AVM in duodenum and 1 AVM in the colon s/p treatment.  There is ongoing concern for more AVMs as well need for chronic monitoring, infusions etc.      History is noted for RA managed by Dr Goodrich, hepatitis B core antibody positivity, and remote history of breast cancer (records not available).     Pt had an infusion 2/2023 injectafer.     5/19/23: reported to ED due to rectal bleeding and abd pain, has CLN planned.    In early 2/24 rec for ER eval in light of the critically HTN. She was treated for pneumonia. Prudencio was then found to have PE and GI bleeding. She was treated with IV heparin and required cauterization twice for GI bleeding, so never started on Apixaban and held from anticoagulation.    Received  some IV iron in 4/24  Prudencio underwent bidirectional scoping with Dr. Aundrea Garcia in GI in June 2023 were several areas of AVM were cauterized.     11/22/24  Interim  Patient presents for f/u of BUZZ s/p IV iron 10/9-10/31/24 (Venofer) with daughter Brea.   She continues monthly octreotide by GI for GI bleeding concerns.   Prudencio's dyspnea on exertion are stable, but no chest pain. Reports worsening fatigue.   Pt denies any systemic signs of illness.   Patient denies blood loss from any orifice that she can see.   Pt requesting if she needs IV iron to get Injectafer or other one dose type, reports she has many appts and that would be ideal and she says, \" I did better with the large dose IV iron given one time.\"       Past Medical History: history of breast cancer, BUZZ,  bleeding AVM, diabetes, HTN, HLD, RA  Past Surgical History: Hysterectomy 1972, IR aneurysm repair, right lumpectomy, knee replacements  Family History: neg for malignancy  Social History: lives with naif, able to do all ADLs, no smoking or ETOH    Current Outpatient Medications:     famotidine 20 MG Oral Tab, Take 1 tablet (20 mg total) by mouth 2 (two) times daily. Patient gets over the counter, Disp: , Rfl:     ciprofloxacin (CIPRO) 500 MG Oral Tab, Take 1 tablet (500 mg total) by mouth 2 (two) times daily., Disp: 14 tablet, Rfl: 0    HYDROXYCHLOROQUINE 200 MG Oral Tab, TAKE 1 TABLET BY MOUTH EVERY DAY, Disp: 90 tablet, Rfl: 0    nitrofurantoin monohydrate macro 100 MG Oral Cap, Take 1 capsule (100 mg total) by mouth 2 (two) times daily., Disp: 14 capsule, Rfl: 0    glipiZIDE 5 MG Oral Tab, Take 1 tablet (5 mg total) by mouth every morning before breakfast., Disp: 90 tablet, Rfl: 0    Cholecalciferol (VITAMIN D3) 1.25 MG (22850 UT) Oral Cap, 1 po [id number]., Disp: 2 capsule, Rfl: 4    Potassium Chloride ER 20 MEQ Oral Tab CR, Take 40 mEq by mouth every morning., Disp: 60 tablet, Rfl: 1    valsartan 80 MG Oral Tab, Take 1 tablet (80 mg total) by mouth 2 (two) times daily., Disp: 180 tablet, Rfl: 3    albuterol 108 (90 Base) MCG/ACT Inhalation Aero Soln, TAKE 2 PUFFS BY MOUTH EVERY 4 TO 6 HOURS AS NEEDED, Disp: 6.7 each, Rfl: 5    loratadine 10 MG Oral Tab, TAKE 1 TABLET BY MOUTH EVERY DAY, Disp: 30 tablet, Rfl: 0    cyclobenzaprine 10 MG Oral Tab, Take 1 tablet (10 mg total) by mouth nightly as needed., Disp: 90 tablet, Rfl: 1    predniSONE 5 MG Oral Tab, Take 1 tablet (5 mg total) by mouth daily., Disp: 90 tablet, Rfl: 0    BD PEN NEEDLE KADEN 2ND GEN 32G X 4 MM Does not apply Misc, 1 EACH DAILY. USE DAILY WITH INSULIN, Disp: 100 each, Rfl: 1    insulin glargine (LANTUS SOLOSTAR) 100 UNIT/ML Subcutaneous Solution Pen-injector, Inject 10 Units into the skin daily with dinner., Disp: 3 mL, Rfl: 1    montelukast  10 MG Oral Tab, Take 1 tablet (10 mg total) by mouth nightly., Disp: 90 tablet, Rfl: 3    carvedilol 12.5 MG Oral Tab, Take 1 tablet (12.5 mg total) by mouth 2 (two) times daily with meals., Disp: 180 tablet, Rfl: 3    fluticasone-salmeterol (WIXELA INHUB) 250-50 MCG/ACT Inhalation Aerosol Powder, Breath Activated, Inhale 1 puff into the lungs every 12 (twelve) hours., Disp: 1 each, Rfl: 5    Blood Glucose Monitoring Suppl (BLOOD GLUCOSE SYSTEM GILLES) Does not apply Kit, Dx. E11.9. Checks qam., Disp: 1 kit, Rfl: 0    Blood Glucose Monitoring Suppl w/Device Does not apply Kit, Dx. E11.9. Checks qam., Disp: 1 kit, Rfl: 0    Blood Gluc Meter Disp-Strips Does not apply Device, Dx. E11.9. Checks qam., Disp: 100 each, Rfl: 0    gabapentin 100 MG Oral Cap, Take 1 capsule (100 mg total) by mouth nightly., Disp: 90 capsule, Rfl: 3    Accu-Chek FastClix Lancets Does not apply Misc, Check sugar once daily as directed (E11.42), Disp: 100 each, Rfl: 2    rosuvastatin (CRESTOR) 20 MG Oral Tab, Take 1 tablet (20 mg total) by mouth nightly., Disp: 90 tablet, Rfl: 0    Blood Glucose Monitoring Suppl (ONETOUCH VERIO) w/Device Does not apply Kit, 1 each daily. Test 1x daily, Disp: 1 kit, Rfl: 0    OneTouch Delica Lancets 33G Does not apply Misc, 4 x daily, Disp: 100 each, Rfl: 1    acetaminophen 500 MG Oral Tab, Take 1 tablet (500 mg total) by mouth daily., Disp: , Rfl:     Allergies:  Allergies   Allergen Reactions    Celebrex [Celecoxib] PALPITATIONS    Penicillins ITCHING and SWELLING    Metformin And Related UNKNOWN    Olmesartan UNKNOWN        Review of Systems:Oncology specific ROS negative except as per HPI    BP (!) 176/77 (BP Location: Left arm, Patient Position: Sitting, Cuff Size: adult)   Pulse 92   Temp 97.5 °F (36.4 °C) (Oral)   Resp 16   Ht 1.549 m (5' 1\")   Wt 65.3 kg (144 lb)   SpO2 96%   BMI 27.21 kg/m²   Wt Readings from Last 6 Encounters:   11/22/24 65.3 kg (144 lb)   11/18/24 65.3 kg (144 lb)   11/13/24 64.9  kg (143 lb)   10/31/24 65.3 kg (144 lb)   10/25/24 64.5 kg (142 lb 3.2 oz)   10/22/24 66.7 kg (147 lb)   General: Patient is alert and oriented x 3, not in acute distress.  HEENT: EOMI, MMM  Chest: Clear to auscultation bilaterally. No WOB.  Abdomen: soft, non tender, non distended  Extremities: no signs of LE edema  Neurological: Strength is grossly intact. Moving all extremities. Gait appropriate.   Psych/Depression: Appropriate mood and affect    Derm: healing right chest port, small ecchymoses below right chest port on breast pink, no edema or hematoma.        Lab Results   Component Value Date    WBC 11.7 (H) 11/22/2024    RBC 3.55 (L) 11/22/2024    HGB 9.5 (L) 11/22/2024    HCT 31.3 (L) 11/22/2024    MCV 88.2 11/22/2024    MCH 26.8 11/22/2024    MCHC 30.4 (L) 11/22/2024    RDW 20.0 (H) 11/22/2024    .0 11/22/2024     Lab Results   Component Value Date    GLU 63 (L) 11/01/2024    BUN 16 11/01/2024    BUNCREA 20.8 (H) 11/01/2024    CREATSERUM 0.77 11/01/2024    ANIONGAP 7 11/01/2024    GFRNAA 47 (L) 05/02/2022    GFRAA 54 (L) 05/02/2022    CA 8.8 11/01/2024    OSMOCALC 295 11/01/2024    ALKPHO 103 10/17/2024    AST 21 10/17/2024    ALT 10 10/17/2024    BILT 0.2 10/17/2024    TP 7.2 10/17/2024    ALB 3.4 10/17/2024    GLOBULIN 4.0 (H) 10/04/2024     11/01/2024    K 4.1 11/01/2024     11/01/2024    CO2 24.0 11/01/2024      Latest Reference Range & Units 08/02/24 10:27   FERRITIN 50.0 - 306.0 ng/mL 78.7     Lab Results   Component Value Date    ROSALIE 246 11/22/2024     Lab Results   Component Value Date    IRON 32 (L) 11/22/2024     Lab Results   Component Value Date    IRON 32 (L) 11/22/2024    TRANSFERRIN 176 (L) 11/22/2024    TIBCP 262 11/22/2024    SAT 12 (L) 11/22/2024       Impression and Plan:  84 year old with chronic iron deficiency anemia in the setting of bleeding AVMs s/p repeat endoscopic treatment, Iv iron treatment and rbc tfx 5/2022,    1.) BUZZ from AVM's    - previously CAPUTO has  been a hallmark for symptomatic anemia for her, also has worsening fatigue and some lightheadedness  --pt received venofer in 4/24 and currently feels improved  --current ferritin level is over 50, so she does not need IV iron  --hgb has been stable~10; f//u in 8 weeks   --pt has improved symptoms with monthly octreotide by Dr. Garcia in GI for continued blood loss; continue care and management    -11/22/24 Last IV iron 10/9-10/31/24 (Venofer) .   She continues monthly octreotide by GI for GI bleeding concerns.   Prudencio's dyspnea on exertion are stable, but no chest pain. Reports worsening fatigue.   Pt denies any systemic signs of illness.   Patient denies blood loss from any orifice that she can see.     11/22/24 HGB 9.5, pending ferritin and iron studies. Consider IV iron if needed.  Plan for labs in 4 weeks and follow up in 8 weeks.    2.) HTN  --remains elevated; improved, she will f/u with her PCP  11/22/24 BP today 176/77, denies headache. Continue to monitor.      3) History of PE  --found on 2/23/24; not on anticoagulation in light of recent GI bleeding   --IVC filter placed on 3/7/24 by IR; she was rec for IVC filter retrial in 3-6 mo by IR, so this was removed  in 6/24  --her PE was noted to be resolved by CT chest on 4/26  -Denies worsening sob, denies chest pain or edema.    4.) COPD  -recent hospitalization and pneumonia 10/22/24, now resolved  followed by Dr. Parker, now has nebulizer for albuterol  -reports no worsening sob    5.) UTI  Recent UTI, on ciprofloxacin, denies fever, back pain or dysuria    MDM: Moderate Risk; ongoing likely GI bleeding from AVM's, HTN, acute PE, IVC filter placement    BILL Alxeandra    [facility]  [place] [facility] [place] [address] [facility], [place] [address]

## 2024-11-25 ENCOUNTER — PATIENT MESSAGE (OUTPATIENT)
Dept: INTERNAL MEDICINE CLINIC | Facility: CLINIC | Age: 84
End: 2024-11-25

## 2024-11-25 DIAGNOSIS — J43.8 OTHER EMPHYSEMA (HCC): Primary | ICD-10-CM

## 2024-11-25 DIAGNOSIS — E04.1 THYROID NODULE: ICD-10-CM

## 2024-11-27 RX ORDER — ALBUTEROL SULFATE 0.83 MG/ML
2.5 SOLUTION RESPIRATORY (INHALATION) EVERY 6 HOURS PRN
Qty: 360 ML | Refills: 3 | Status: SHIPPED | OUTPATIENT
Start: 2024-11-27

## 2024-11-27 NOTE — TELEPHONE ENCOUNTER
11/26/24  8:47 PM     Hi Dr. Parker  The last Hospital visit my mom Prudencio had, the doctor on duty sent her home with a Nebulizer. When we tried to refill the medication for the nebulizer at the pharmacy. We were told they could not contact that particular doctor from the emergency room. I contacted Dr. Sorto's office and was told we should contact your office concerning a refill or new prescription. Attached is the medication that was prescribed to her, can you further advise if she needs to see you again or can a prescription be sent to the pharmacy. Thank you for your time.  Sincerely,  Tommy Leyva for  Chester Bautista 2/21/1940

## 2024-11-27 NOTE — TELEPHONE ENCOUNTER
Dr. Parker-LOV 10/31/2024. Order pended for albuterol nebulizer solution. Please sign if agreeable.

## 2024-11-29 ENCOUNTER — NURSE ONLY (OUTPATIENT)
Dept: HEMATOLOGY/ONCOLOGY | Facility: HOSPITAL | Age: 84
End: 2024-11-29
Attending: INTERNAL MEDICINE
Payer: MEDICARE

## 2024-11-29 DIAGNOSIS — Z45.2 ENCOUNTER FOR CARE RELATED TO VASCULAR ACCESS PORT: Primary | ICD-10-CM

## 2024-11-29 DIAGNOSIS — Z51.81 MEDICATION MONITORING ENCOUNTER: ICD-10-CM

## 2024-11-29 DIAGNOSIS — R53.83 OTHER FATIGUE: ICD-10-CM

## 2024-11-29 DIAGNOSIS — K90.9 MALABSORPTION OF IRON (HCC): ICD-10-CM

## 2024-11-29 DIAGNOSIS — M05.79 RHEUMATOID ARTHRITIS INVOLVING MULTIPLE SITES WITH POSITIVE RHEUMATOID FACTOR (HCC): ICD-10-CM

## 2024-11-29 DIAGNOSIS — Q27.30 AVM (ARTERIOVENOUS MALFORMATION) (HCC): ICD-10-CM

## 2024-11-29 DIAGNOSIS — D50.0 IRON DEFICIENCY ANEMIA DUE TO CHRONIC BLOOD LOSS: ICD-10-CM

## 2024-11-29 LAB
ALBUMIN SERPL-MCNC: 3.5 G/DL (ref 3.2–4.8)
ALT SERPL-CCNC: 14 U/L
ANION GAP SERPL CALC-SCNC: 8 MMOL/L (ref 0–18)
AST SERPL-CCNC: 31 U/L (ref ?–34)
BASOPHILS # BLD AUTO: 0.05 X10(3) UL (ref 0–0.2)
BASOPHILS NFR BLD AUTO: 0.5 %
BUN BLD-MCNC: 22 MG/DL (ref 9–23)
BUN/CREAT SERPL: 23.4 (ref 10–20)
CALCIUM BLD-MCNC: 9.4 MG/DL (ref 8.7–10.4)
CHLORIDE SERPL-SCNC: 109 MMOL/L (ref 98–112)
CO2 SERPL-SCNC: 25 MMOL/L (ref 21–32)
CREAT BLD-MCNC: 0.94 MG/DL
CREAT BLD-MCNC: 0.94 MG/DL
CRP SERPL-MCNC: 3 MG/DL (ref ?–1)
DEPRECATED RDW RBC AUTO: 64 FL (ref 35.1–46.3)
EGFRCR SERPLBLD CKD-EPI 2021: 59 ML/MIN/1.73M2 (ref 60–?)
EGFRCR SERPLBLD CKD-EPI 2021: 59 ML/MIN/1.73M2 (ref 60–?)
EOSINOPHIL # BLD AUTO: 0.17 X10(3) UL (ref 0–0.7)
EOSINOPHIL NFR BLD AUTO: 1.8 %
ERYTHROCYTE [DISTWIDTH] IN BLOOD BY AUTOMATED COUNT: 19.7 % (ref 11–15)
ERYTHROCYTE [SEDIMENTATION RATE] IN BLOOD: >130 MM/HR
GLUCOSE BLD-MCNC: 74 MG/DL (ref 70–99)
HCT VFR BLD AUTO: 30.9 %
HGB BLD-MCNC: 9.2 G/DL
IMM GRANULOCYTES # BLD AUTO: 0.11 X10(3) UL (ref 0–1)
IMM GRANULOCYTES NFR BLD: 1.1 %
LYMPHOCYTES # BLD AUTO: 1.5 X10(3) UL (ref 1–4)
LYMPHOCYTES NFR BLD AUTO: 15.7 %
MCH RBC QN AUTO: 26.7 PG (ref 26–34)
MCHC RBC AUTO-ENTMCNC: 29.8 G/DL (ref 31–37)
MCV RBC AUTO: 89.6 FL
MONOCYTES # BLD AUTO: 1.04 X10(3) UL (ref 0.1–1)
MONOCYTES NFR BLD AUTO: 10.9 %
NEUTROPHILS # BLD AUTO: 6.71 X10 (3) UL (ref 1.5–7.7)
NEUTROPHILS # BLD AUTO: 6.71 X10(3) UL (ref 1.5–7.7)
NEUTROPHILS NFR BLD AUTO: 70 %
OSMOLALITY SERPL CALC.SUM OF ELEC: 296 MOSM/KG (ref 275–295)
PLATELET # BLD AUTO: 279 10(3)UL (ref 150–450)
POTASSIUM SERPL-SCNC: 3.9 MMOL/L (ref 3.5–5.1)
RBC # BLD AUTO: 3.45 X10(6)UL
SODIUM SERPL-SCNC: 142 MMOL/L (ref 136–145)
T4 FREE SERPL-MCNC: 1.1 NG/DL (ref 0.8–1.7)
TSI SER-ACNC: 0.64 UIU/ML (ref 0.55–4.78)
WBC # BLD AUTO: 9.6 X10(3) UL (ref 4–11)

## 2024-11-29 PROCEDURE — 36591 DRAW BLOOD OFF VENOUS DEVICE: CPT

## 2024-11-29 PROCEDURE — 84450 TRANSFERASE (AST) (SGOT): CPT

## 2024-11-29 PROCEDURE — 84443 ASSAY THYROID STIM HORMONE: CPT

## 2024-11-29 PROCEDURE — 84460 ALANINE AMINO (ALT) (SGPT): CPT

## 2024-11-29 PROCEDURE — 80048 BASIC METABOLIC PNL TOTAL CA: CPT | Performed by: INTERNAL MEDICINE

## 2024-11-29 PROCEDURE — 85652 RBC SED RATE AUTOMATED: CPT

## 2024-11-29 PROCEDURE — 82040 ASSAY OF SERUM ALBUMIN: CPT

## 2024-11-29 PROCEDURE — 86140 C-REACTIVE PROTEIN: CPT

## 2024-11-29 PROCEDURE — 82565 ASSAY OF CREATININE: CPT

## 2024-11-29 PROCEDURE — 84439 ASSAY OF FREE THYROXINE: CPT

## 2024-11-29 PROCEDURE — 85025 COMPLETE CBC W/AUTO DIFF WBC: CPT

## 2024-11-29 PROCEDURE — 96372 THER/PROPH/DIAG INJ SC/IM: CPT

## 2024-11-29 RX ORDER — SODIUM CHLORIDE 9 MG/ML
10 INJECTION, SOLUTION INTRAMUSCULAR; INTRAVENOUS; SUBCUTANEOUS ONCE
OUTPATIENT
Start: 2024-12-27

## 2024-11-29 RX ORDER — OCTREOTIDE ACETATE 20 MG
20 KIT INTRAMUSCULAR ONCE
Status: COMPLETED | OUTPATIENT
Start: 2024-11-29 | End: 2024-11-29

## 2024-11-29 RX ORDER — OCTREOTIDE ACETATE 20 MG
20 KIT INTRAMUSCULAR ONCE
OUTPATIENT
Start: 2024-12-27

## 2024-11-29 RX ADMIN — OCTREOTIDE ACETATE 20 MG: 20 MG KIT INTRAMUSCULAR at 14:52:00

## 2024-12-02 ENCOUNTER — TELEPHONE (OUTPATIENT)
Facility: CLINIC | Age: 84
End: 2024-12-02

## 2024-12-02 NOTE — TELEPHONE ENCOUNTER
GI office received fax from [employer] stating sandostatin LAR depot approved from 1/1/2025 through 12/31/2025. Sent to scan.

## 2024-12-03 ENCOUNTER — OFFICE VISIT (OUTPATIENT)
Dept: RHEUMATOLOGY | Facility: CLINIC | Age: 84
End: 2024-12-03

## 2024-12-03 VITALS
BODY MASS INDEX: 27.56 KG/M2 | SYSTOLIC BLOOD PRESSURE: 157 MMHG | WEIGHT: 146 LBS | DIASTOLIC BLOOD PRESSURE: 78 MMHG | HEART RATE: 101 BPM | HEIGHT: 61 IN

## 2024-12-03 DIAGNOSIS — Z51.81 MEDICATION MONITORING ENCOUNTER: ICD-10-CM

## 2024-12-03 DIAGNOSIS — M05.79 RHEUMATOID ARTHRITIS INVOLVING MULTIPLE SITES WITH POSITIVE RHEUMATOID FACTOR (HCC): Primary | ICD-10-CM

## 2024-12-03 PROCEDURE — 3077F SYST BP >= 140 MM HG: CPT | Performed by: INTERNAL MEDICINE

## 2024-12-03 PROCEDURE — 3008F BODY MASS INDEX DOCD: CPT | Performed by: INTERNAL MEDICINE

## 2024-12-03 PROCEDURE — 99214 OFFICE O/P EST MOD 30 MIN: CPT | Performed by: INTERNAL MEDICINE

## 2024-12-03 PROCEDURE — 3078F DIAST BP <80 MM HG: CPT | Performed by: INTERNAL MEDICINE

## 2024-12-03 PROCEDURE — 1159F MED LIST DOCD IN RCRD: CPT | Performed by: INTERNAL MEDICINE

## 2024-12-03 PROCEDURE — 1125F AMNT PAIN NOTED PAIN PRSNT: CPT | Performed by: INTERNAL MEDICINE

## 2024-12-03 PROCEDURE — G2211 COMPLEX E/M VISIT ADD ON: HCPCS | Performed by: INTERNAL MEDICINE

## 2024-12-03 PROCEDURE — 1160F RVW MEDS BY RX/DR IN RCRD: CPT | Performed by: INTERNAL MEDICINE

## 2024-12-03 RX ORDER — PREDNISONE 5 MG/1
5 TABLET ORAL DAILY
Qty: 90 TABLET | Refills: 1 | Status: SHIPPED | OUTPATIENT
Start: 2024-12-03

## 2024-12-03 RX ORDER — HYDROXYCHLOROQUINE SULFATE 200 MG/1
300 TABLET, FILM COATED ORAL DAILY
Qty: 135 TABLET | Refills: 1 | Status: SHIPPED | OUTPATIENT
Start: 2024-12-03

## 2024-12-03 RX ORDER — PREDNISONE 10 MG/1
TABLET ORAL
Qty: 20 TABLET | Refills: 0 | Status: SHIPPED | OUTPATIENT
Start: 2024-12-03

## 2024-12-03 NOTE — PROGRESS NOTES
Chester Bautista is a 84 year old female.    HPI:     Chief Complaint   Patient presents with    Rheumatoid Arthritis    Joint Pain       I had the pleasure of seeing Chester Bautista on 12/3/2024 for follow up RA.     Current medications:  Prednisone 5 mg daily- restarted in aug 2021   mg daily- started 2/27/2023  Gabapentin 100 mg nightly   Tylenol arthritis 2 pills daily  Previous medications:  SSZ 1000 mg bid- started 11/2021-1/2021 had HA  Methotrexate- took in [place]   Cymbalta 40 mg daily for neuropathy   Leflunomide 20 mg daily- started jan 2022- stopped 2/2023, had pain in b/l feet with n/t  Blood work:  , CCP>340  ESR 85  Neg ACE    Interval History:  This is a 80 yo F with hx of HTN, HLD, Breast cancer s/p lumpectomy and radiation (2017), ?Sarcoidosis, B/L TKR, B/L shoulder replacement presents to establish care for RA.  She just moved from [place] about a month ago to [place].  Her  passed away in February so there has been a lot of stress in her life.  Diagnosed with RA back in 2003.  At that time she reports stiffness and pain in all her joints.  She could hardly get out of bed.  Was hard to even make a fist or raise her arms.  She was on Celebrex at that time and methotrexate.  Was never put on any Biologics.  She was then put on prednisone 20 mg daily and was on chronic prednisone for about 1.5 yrs.  Was tapered down to approximately 3 mg daily.  She then went off of prednisone for about 4 to 5 years and was doing well up until recently.  She restarted prednisone when she was in [place] around August 2021 due to her joint pain.  She was having pain in her hands, wrists, knees and ankles and swelling.  She was started on prednisone 3 mg daily.  In September she developed a cough and shortness of breath.  Per patient she was seen by pulmonology and they diagnosed her with sarcoidosis based on her chest x-ray and CAT scan.  No prior history of  sarcoidosis.  No history of uveitis, erythema nodosum, fevers or night sweats.  She does report weight loss about 20 pounds from February till November but she has not been eating well and her  passed away recently.  Her prednisone was increased to 5 mg daily but now she is tapered down to 3 mg daily.  She has an appointment with pulmonology in January.  Do not have records of her chest x-ray or CT scan.  Cough has resolved.  Has minimal shortness of breath.    1/10/2022:   Presents for follow-up of rheumatoid arthritis  Currently on sulfasalazine at 1000 mg twice a day for the past month.  Also on prednisone 3 mg daily  Continues to have diffuse joint pain and swelling  Currently has swelling and pain in her left MCP and wrists.  Bilateral ankles.  Reports a lot of stiffness in the morning.  Sulfasalazine helped only mildly, also caused daily headaches    2/28/2022:  She presents for follow-up of rheumatoid arthritis  During her last visit in January she had a lot of joint pain and swelling involving her hands and wrists  She was started on high-dose of prednisone and now on prednisone 5 mg daily  She was also started leflunomide 20 mg daily  Joint pain and swelling has improved significantly.  Minimal pain or stiffness in her hands and wrists.  Able to make a full fist  Reports of swelling in her right foot mostly at the end of the day.  Not very painful  Inflammatory markers remain elevated, ESR 80 and CRP 1.19    5/4/2022:  She presents for follow-up of rheumatoid arthritis  Remains on leflunomide 20 mg daily and prednisone 5 mg daily  She had blood work done on 5/2 showing hemoglobin of 6.0.  She advised to go to the ED and received 2 units of blood.  She was seen by her PCP and referred to GI  Further blood work does show iron deficiency anemia. Which is already known, had iron transfusion in November 2021 and now iron pills   Joints are doing well overall, no swelling or stiffness  Has been having more  pain in both feet jeimy at night, burning sensation with the tingling.  Has to soak them in water at night.  No symptoms during the day.  She will be having an EMG done on June 28 8/8/2022:  Presents for follow-up of RA  At times she will have some stiffness in her hands in the morning but it goes away.  No swelling  Left knee will swell at times  Continues to have tingling and numbness in her feet.  She was seen by Dr. Mcnamara  EMG showed bilateral sural and medial plantar sensory nerve actions absent, it was consistent with sensorimotor peripheral neuropathy likely related to diabetes  She was on Cymbalta 40 mg daily but continues to have symptoms    11/14/2022:  Presents for follow-up of RA  Currently on Leflunomide 20 mg daily and prednisone 5 mg daily  Having stiffness and soreness in b/l hands in the morning. L hand all fingers, R hand is thumb and pinky finger  Some swelling in fingers, can make fist in AM but sore  L knee painful at times and swells, XR done showing left knee prosthesis is in place.  Both shoulders and R knee have been replaced   Also taking tylenol arthritis 650 mg 2 pills BID and does help her pain  Also getting Iron infusion for low Hgb due to AVMs    2/27/2023:  Presents for follow-up of RA  Currently on leflunomide 20 mg daily and prednisone 5 mg daily  Continues to have a lot of pain involving her hands, wrist and elbows.  Shoulders can hurt to  Also has some pain in her feet and ankles.  Reports a lot of soreness all over  At times her hands and fingers can swell.  She stopped Cymbalta and reports that her feet pain improved.  She does not have neuropathy now  Blood work showed high inflammation markers    5/4/2023:  Presents for follow-up of RA  She stopped leflunomide a couple of months ago  Also off Cymbalta  During her last visit she was placed on Plaquenil 200 mg daily, still on predisone 5 mg daily.  She had a flare in April requiring a Medrol Dosepak  Recent blood work is  showing elevated inflammation markers, ESR 64 in February ESR was 86 and CRP 2.37  Has pain daily but degree of pain is different every day  At times has swelling in hands, wrists but not daily  Since starting HCQ has been having diarrhea, has improved, goes 3 times a day  The pain in the feet and neuropathy, was on Cymbalta and helped but stopped the medication     8/10/2023:  Presents for follow-up of RA  On  mg daily and prednisone 5 mg daily  Will have some pain in the fingers and wrists, jeimy the r 5th finger, can make a fist  She was not improved for Cimzia but plan was to go through financial counselor.  She states her joints are not too bad right now    12/3/2024:  Presents for follow-up of RA  On  mg daily and prednisone 5 mg daily  Having some changes in the hands, left thumb IP joint has changed  All joints ache overall  Has been having more flares   Hard to  or open bottle of water  Also has pain in elbows, shoulder are not too bad  Knees and ankles are painful  Both knees and shoulders are replaced  No rashes or fevers  Had recent UTI and was on Abx and completed it  Blood work done and inflammation markers are high, ESR>130 and CRP 3.0 mg/dL  She is also anemic and on Fe infusions, sees hematologist  He was hospitalized Feb 2024 and had PE s/p IVC filter but now removed but not on blood thinner due to AVM bleeds  And hospitalized in Oct 2024 for SOB for COPD and fibrosis          HISTORY:  Past Medical History:    Anxiety state    Cancer (HCC)    breast cancer 2018    Chronic obstructive pulmonary disease, unspecified (HCC)    Chronic obstructive pulmonary disease, unspecified (HCC)    COPD (chronic obstructive pulmonary disease) (HCC)    Diabetes (HCC)    Diabetes mellitus (HCC)    Essential hypertension    Exposure to medical diagnostic radiation    High blood pressure    High cholesterol    History of blood transfusion    low hemoglobin    Osteoarthritis    PONV (postoperative  nausea and vomiting)    Pulmonary embolism (HCC)    Pulmonary emphysema (HCC)    Rheumatoid arthritis (HCC)      Social Hx Reviewed   Family Hx Reviewed     Medications (Active prior to today's visit):  Current Outpatient Medications   Medication Sig Dispense Refill    albuterol (2.5 MG/3ML) 0.083% Inhalation Nebu Soln Take 3 mL (2.5 mg total) by nebulization every 6 (six) hours as needed for Wheezing. 360 mL 3    famotidine 20 MG Oral Tab Take 1 tablet (20 mg total) by mouth 2 (two) times daily. Patient gets over the counter      HYDROXYCHLOROQUINE 200 MG Oral Tab TAKE 1 TABLET BY MOUTH EVERY DAY 90 tablet 0    glipiZIDE 5 MG Oral Tab Take 1 tablet (5 mg total) by mouth every morning before breakfast. 90 tablet 0    Cholecalciferol (VITAMIN D3) 1.25 MG (45761 UT) Oral Cap 1 po [id number]. 2 capsule 4    Potassium Chloride ER 20 MEQ Oral Tab CR Take 40 mEq by mouth every morning. 60 tablet 1    valsartan 80 MG Oral Tab Take 1 tablet (80 mg total) by mouth 2 (two) times daily. 180 tablet 3    albuterol 108 (90 Base) MCG/ACT Inhalation Aero Soln TAKE 2 PUFFS BY MOUTH EVERY 4 TO 6 HOURS AS NEEDED 6.7 each 5    loratadine 10 MG Oral Tab TAKE 1 TABLET BY MOUTH EVERY DAY 30 tablet 0    cyclobenzaprine 10 MG Oral Tab Take 1 tablet (10 mg total) by mouth nightly as needed. 90 tablet 1    predniSONE 5 MG Oral Tab Take 1 tablet (5 mg total) by mouth daily. 90 tablet 0    BD PEN NEEDLE KADEN 2ND GEN 32G X 4 MM Does not apply Misc 1 EACH DAILY. USE DAILY WITH INSULIN 100 each 1    insulin glargine (LANTUS SOLOSTAR) 100 UNIT/ML Subcutaneous Solution Pen-injector Inject 10 Units into the skin daily with dinner. 3 mL 1    montelukast 10 MG Oral Tab Take 1 tablet (10 mg total) by mouth nightly. 90 tablet 3    carvedilol 12.5 MG Oral Tab Take 1 tablet (12.5 mg total) by mouth 2 (two) times daily with meals. 180 tablet 3    fluticasone-salmeterol (WIXELA INHUB) 250-50 MCG/ACT Inhalation Aerosol Powder, Breath Activated Inhale 1 puff into  the lungs every 12 (twelve) hours. 1 each 5    Blood Glucose Monitoring Suppl (BLOOD GLUCOSE SYSTEM GILLES) Does not apply Kit Dx. E11.9. Checks qam. 1 kit 0    Blood Glucose Monitoring Suppl w/Device Does not apply Kit Dx. E11.9. Checks qam. 1 kit 0    Blood Gluc Meter Disp-Strips Does not apply Device Dx. E11.9. Checks qam. 100 each 0    gabapentin 100 MG Oral Cap Take 1 capsule (100 mg total) by mouth nightly. 90 capsule 3    Accu-Chek FastClix Lancets Does not apply Misc Check sugar once daily as directed (E11.42) 100 each 2    rosuvastatin (CRESTOR) 20 MG Oral Tab Take 1 tablet (20 mg total) by mouth nightly. 90 tablet 0    Blood Glucose Monitoring Suppl (ONETOUCH VERIO) w/Device Does not apply Kit 1 each daily. Test 1x daily 1 kit 0    OneTouch Delica Lancets 33G Does not apply Misc 4 x daily 100 each 1    acetaminophen 500 MG Oral Tab Take 1 tablet (500 mg total) by mouth daily.      ciprofloxacin (CIPRO) 500 MG Oral Tab Take 1 tablet (500 mg total) by mouth 2 (two) times daily. (Patient not taking: Reported on 12/3/2024) 14 tablet 0    nitrofurantoin monohydrate macro 100 MG Oral Cap Take 1 capsule (100 mg total) by mouth 2 (two) times daily. (Patient not taking: Reported on 12/3/2024) 14 capsule 0     .cmed  Allergies:  Allergies   Allergen Reactions    Celebrex [Celecoxib] PALPITATIONS    Penicillins ITCHING and SWELLING    Amlodipine OTHER (SEE COMMENTS)     Calves Swelling     Metformin And Related UNKNOWN    Olmesartan UNKNOWN         ROS:   All other ROS are negative.     PHYSICAL EXAM:   GEN: AAOx3, NAD  HEENT: EOMI, PERRLA, no injection or icterus, oral mucosa moist, no oral lesions. No lymphadenopathy. No facial rash  CVS: RRR, no murmurs rubs or gallops. Equal 2+ distal pulses.   LUNGS: CTAB, no increased work of breathing  ABDOMEN:  soft NT/ND, +BS, no HSM  SKIN: No rashes or skin lesions. No nail findings  MSK:  Cervical spine: FROM  Hands: No swelling in her MCPs, able to make a full fist  bilaterally, TTP in PIPs and MCPS  Wrist: No swelling in the wrist, nontender to palpation  Elbow: FROM, no pain or swelling or warmth on palpation  Shoulders: R shoulder limited ROM  Hip: normal log roll, no lateral hip pain, GLORY test negative b/l  Knees: FROM, no warmth or effusion present. No pain with ROM.   Ankles: no swelling  Feet: no pain with MTP squeeze, no toe swelling or pain or warmth on palpation with FROM  Spine: no lumbar or sacral pain on palpation.  NEURO: Cranial nerves II-XII intact grossly. 5/5 strength throughout in both upper and lower extremities, sensation intact.  PSYCH: normal mood       LABS:     Component      Latest Ref Rng & Units 11/7/2021   SED RATE      0 - 30 mm/Hr 85 (H)   C-Citrullinated Peptide IgG AB      0.0 - 6.9 U/mL >340.0 (H)   RHEUMATOID FACTOR      <15 IU/mL 745 (H)     Imaging:     None    ASSESSMENT/PLAN:     Seropositive RA- worsening   - Currently on hydroxychloroquine 200 mg daily and prednisone 5 mg daily  - having worsening flares   - Off leflunomide as it caused worsening neuropathy  - had s/e with methotrexate and it was ineffective   - Plan to get approved for cimzia again  - Advised to see ophthalmology     Hx of PE  - she was hospitalized February 2024 for PE s/p IVC filter, now removed  - cannot go on blood thinners due to AVM bleed    COPD and Pulmonary fibrosis  - following with pulmonology     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  Getting IV iron infusions    Peripheral neuropathy- stable, resolved   - Was seen by Dr. 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  Wondering if she needs to go on treatment for the positive hepatitis B    Pt will f/u in 3 mos     There is a longitudinal care relationship with me,  the care plan reflects the ongoing nature of the continuous relationship of care, and the medical record indicates that there is ongoing treatment of a serious/complex medical condition which I am currently managing.  is Applicable.     Ashia Goodrich MD  12/3/2024  4:06 PM

## 2024-12-03 NOTE — PATIENT INSTRUCTIONS
You were seen today for rheumatoid arthritis  You are having worsening joint pain and swelling  Increase hydroxychloroquine to 300 mg daily  For now continue prednisone 5 mg daily  Plan to get approved for Cimzia injections  Get the TB blood work today  Please follow-up in the next 4 months

## 2024-12-04 RX ORDER — POTASSIUM CHLORIDE 1500 MG/1
40 TABLET, EXTENDED RELEASE ORAL EVERY MORNING
Qty: 60 TABLET | Refills: 1 | Status: SHIPPED | OUTPATIENT
Start: 2024-12-04

## 2024-12-05 ENCOUNTER — TELEPHONE (OUTPATIENT)
Dept: RHEUMATOLOGY | Facility: CLINIC | Age: 84
End: 2024-12-05

## 2024-12-05 DIAGNOSIS — M05.79 RHEUMATOID ARTHRITIS INVOLVING MULTIPLE SITES WITH POSITIVE RHEUMATOID FACTOR (HCC): Primary | ICD-10-CM

## 2024-12-05 NOTE — TELEPHONE ENCOUNTER
If we can get patient approved for Cimzia loading and maintenance dose, in office injections for rheumatoid arthritis

## 2024-12-06 ENCOUNTER — PATIENT MESSAGE (OUTPATIENT)
Dept: RHEUMATOLOGY | Facility: CLINIC | Age: 84
End: 2024-12-06

## 2024-12-09 RX ORDER — LORATADINE 10 MG/1
TABLET ORAL
Qty: 30 TABLET | Refills: 1 | Status: SHIPPED | OUTPATIENT
Start: 2024-12-09

## 2024-12-09 NOTE — TELEPHONE ENCOUNTER
Refill request sent to pharmacy on 12/9/24 for Loratadine, last Office visit on 10/20/23, will send in a refill, patient reminded to schedule an appt for yearly refills.

## 2024-12-09 NOTE — TELEPHONE ENCOUNTER
[facility] Team - Please obtain prior authorization for in office Cimzia loading and maintenance dose    Dr. Goodrich - please sign referral for Cimzia

## 2024-12-10 NOTE — TELEPHONE ENCOUNTER
Patient with [employer] [facility] HMO. Referral in place and states auth.     Dr. Goodrich - patient has not completed recently ordered labs, including TB. Okay to continue with loading and maintenance dosing? Or patient to complete labs first?    Last TB test:  Component      Latest Ref Rng 11/18/2021   Quantiferon TB NIL      IU/mL 0.09    Quantiferon-TB1 Minus NIL      IU/mL -0.08    Quantiferon-TB2 Minus NIL      IU/mL -0.08    Quantiferon TB Mitogen minus NIL      IU/mL 0.99    Quantiferon TB Result      Negative  Negative

## 2024-12-11 ENCOUNTER — TELEPHONE (OUTPATIENT)
Dept: INTERNAL MEDICINE CLINIC | Facility: CLINIC | Age: 84
End: 2024-12-11

## 2024-12-11 PROBLEM — N17.9 AKI (ACUTE KIDNEY INJURY): Status: RESOLVED | Noted: 2024-02-29 | Resolved: 2024-12-11

## 2024-12-11 PROBLEM — R42 ORTHOSTATIC DIZZINESS: Status: RESOLVED | Noted: 2024-03-05 | Resolved: 2024-12-11

## 2024-12-11 PROBLEM — E04.1 CYSTIC THYROID NODULE: Status: ACTIVE | Noted: 2024-12-11

## 2024-12-11 PROBLEM — I26.99 ACUTE PULMONARY EMBOLISM (HCC): Status: RESOLVED | Noted: 2024-02-23 | Resolved: 2024-12-11

## 2024-12-11 PROBLEM — I26.99 OTHER PULMONARY EMBOLISM WITHOUT ACUTE COR PULMONALE (HCC): Status: RESOLVED | Noted: 2024-03-09 | Resolved: 2024-12-11

## 2024-12-11 PROBLEM — R33.9 URINARY RETENTION: Status: RESOLVED | Noted: 2024-03-04 | Resolved: 2024-12-11

## 2024-12-11 PROBLEM — J18.9 PNEUMONIA: Status: RESOLVED | Noted: 2024-10-17 | Resolved: 2024-12-11

## 2024-12-11 PROBLEM — D62 ACUTE ON CHRONIC BLOOD LOSS ANEMIA: Status: RESOLVED | Noted: 2024-02-26 | Resolved: 2024-12-11

## 2024-12-11 PROBLEM — K31.811 AVM (ARTERIOVENOUS MALFORMATION) OF DUODENUM, ACQUIRED WITH HEMORRHAGE: Status: RESOLVED | Noted: 2024-03-05 | Resolved: 2024-12-11

## 2024-12-11 PROBLEM — N17.9 AKI (ACUTE KIDNEY INJURY) (HCC): Status: RESOLVED | Noted: 2024-02-29 | Resolved: 2024-12-11

## 2024-12-11 PROBLEM — J18.9 PNEUMONIA OF BOTH LUNGS DUE TO INFECTIOUS ORGANISM, UNSPECIFIED PART OF LUNG: Status: RESOLVED | Noted: 2024-10-17 | Resolved: 2024-12-11

## 2024-12-11 PROBLEM — J96.01 ACUTE RESPIRATORY FAILURE WITH HYPOXIA (HCC): Status: RESOLVED | Noted: 2024-10-17 | Resolved: 2024-12-11

## 2024-12-11 PROBLEM — I82.90 VTE (VENOUS THROMBOEMBOLISM): Status: RESOLVED | Noted: 2024-02-25 | Resolved: 2024-12-11

## 2024-12-11 NOTE — TELEPHONE ENCOUNTER
Patients daughter called on her behalf regarding her scheduled appointment tomorrow 12/12 @ 4pm. Per daughter they had a death in their family and they are trying to leave out of town early tomorrow morning. She wants to know if the patient can be rescheduled and squeezed in for another appointment before the end of this year. I did inform her that the next opening is not until April.

## 2024-12-12 ENCOUNTER — OFFICE VISIT (OUTPATIENT)
Dept: INTERNAL MEDICINE CLINIC | Facility: CLINIC | Age: 84
End: 2024-12-12

## 2024-12-12 ENCOUNTER — NURSE ONLY (OUTPATIENT)
Dept: HEMATOLOGY/ONCOLOGY | Facility: HOSPITAL | Age: 84
End: 2024-12-12
Attending: INTERNAL MEDICINE
Payer: MEDICARE

## 2024-12-12 VITALS
HEIGHT: 61 IN | SYSTOLIC BLOOD PRESSURE: 149 MMHG | OXYGEN SATURATION: 100 % | BODY MASS INDEX: 27.34 KG/M2 | HEART RATE: 84 BPM | DIASTOLIC BLOOD PRESSURE: 80 MMHG | TEMPERATURE: 97 F | WEIGHT: 144.81 LBS

## 2024-12-12 DIAGNOSIS — E04.1 CYSTIC THYROID NODULE: ICD-10-CM

## 2024-12-12 DIAGNOSIS — K59.00 CONSTIPATION, UNSPECIFIED CONSTIPATION TYPE: ICD-10-CM

## 2024-12-12 DIAGNOSIS — N18.31 STAGE 3A CHRONIC KIDNEY DISEASE (HCC): Chronic | ICD-10-CM

## 2024-12-12 DIAGNOSIS — H43.393 VITREOUS FLOATERS OF BOTH EYES: ICD-10-CM

## 2024-12-12 DIAGNOSIS — Z95.828 PRESENCE OF IVC FILTER: ICD-10-CM

## 2024-12-12 DIAGNOSIS — D50.0 IRON DEFICIENCY ANEMIA DUE TO CHRONIC BLOOD LOSS: ICD-10-CM

## 2024-12-12 DIAGNOSIS — K31.819 AVM (ARTERIOVENOUS MALFORMATION) OF DUODENUM, ACQUIRED: ICD-10-CM

## 2024-12-12 DIAGNOSIS — E13.51 PERIPHERAL VASCULAR DISEASE DUE TO SECONDARY DIABETES MELLITUS (HCC): ICD-10-CM

## 2024-12-12 DIAGNOSIS — K31.819 GASTRIC AVM: ICD-10-CM

## 2024-12-12 DIAGNOSIS — Z12.11 COLON CANCER SCREENING: ICD-10-CM

## 2024-12-12 DIAGNOSIS — E83.52 HYPERCALCEMIA: ICD-10-CM

## 2024-12-12 DIAGNOSIS — E78.5 DYSLIPIDEMIA: ICD-10-CM

## 2024-12-12 DIAGNOSIS — M15.0 PRIMARY OSTEOARTHRITIS INVOLVING MULTIPLE JOINTS: ICD-10-CM

## 2024-12-12 DIAGNOSIS — G62.9 NEUROPATHY: ICD-10-CM

## 2024-12-12 DIAGNOSIS — N94.89 ADNEXAL MASS: Primary | ICD-10-CM

## 2024-12-12 DIAGNOSIS — Z96.1 PSEUDOPHAKIA OF BOTH EYES: ICD-10-CM

## 2024-12-12 DIAGNOSIS — E87.5 HYPERKALEMIA: ICD-10-CM

## 2024-12-12 DIAGNOSIS — Z00.00 ADULT GENERAL MEDICAL EXAMINATION: ICD-10-CM

## 2024-12-12 DIAGNOSIS — D62 ACUTE ON CHRONIC BLOOD LOSS ANEMIA: ICD-10-CM

## 2024-12-12 DIAGNOSIS — M05.79 RHEUMATOID ARTHRITIS INVOLVING MULTIPLE SITES WITH POSITIVE RHEUMATOID FACTOR (HCC): ICD-10-CM

## 2024-12-12 DIAGNOSIS — N17.9 AKI (ACUTE KIDNEY INJURY) (HCC): ICD-10-CM

## 2024-12-12 DIAGNOSIS — Z95.828 PORT-A-CATH IN PLACE: ICD-10-CM

## 2024-12-12 DIAGNOSIS — E83.42 HYPOMAGNESEMIA: ICD-10-CM

## 2024-12-12 DIAGNOSIS — K90.9 MALABSORPTION OF IRON (HCC): ICD-10-CM

## 2024-12-12 DIAGNOSIS — Z71.85 VACCINE COUNSELING: ICD-10-CM

## 2024-12-12 DIAGNOSIS — C50.911 INFILTRATING DUCTAL CARCINOMA OF RIGHT BREAST, STAGE 1 (HCC): ICD-10-CM

## 2024-12-12 DIAGNOSIS — I10 PRIMARY HYPERTENSION: ICD-10-CM

## 2024-12-12 DIAGNOSIS — E11.9 TYPE 2 DIABETES MELLITUS WITHOUT RETINOPATHY (HCC): ICD-10-CM

## 2024-12-12 DIAGNOSIS — Z51.81 MEDICATION MONITORING ENCOUNTER: ICD-10-CM

## 2024-12-12 DIAGNOSIS — J43.8 OTHER EMPHYSEMA (HCC): ICD-10-CM

## 2024-12-12 DIAGNOSIS — E87.6 HYPOKALEMIA: ICD-10-CM

## 2024-12-12 DIAGNOSIS — H04.123 DRY EYE SYNDROME OF BOTH EYES: ICD-10-CM

## 2024-12-12 DIAGNOSIS — D86.9 SARCOIDOSIS: ICD-10-CM

## 2024-12-12 DIAGNOSIS — H40.003 GLAUCOMA SUSPECT OF BOTH EYES: ICD-10-CM

## 2024-12-12 DIAGNOSIS — D50.8 OTHER IRON DEFICIENCY ANEMIA: ICD-10-CM

## 2024-12-12 DIAGNOSIS — D72.828 OTHER ELEVATED WHITE BLOOD CELL (WBC) COUNT: ICD-10-CM

## 2024-12-12 DIAGNOSIS — H02.886 MEIBOMIAN GLAND DYSFUNCTION (MGD) OF BOTH EYES: ICD-10-CM

## 2024-12-12 DIAGNOSIS — H02.883 MEIBOMIAN GLAND DYSFUNCTION (MGD) OF BOTH EYES: ICD-10-CM

## 2024-12-12 DIAGNOSIS — E55.9 VITAMIN D DEFICIENCY: ICD-10-CM

## 2024-12-12 LAB
ANION GAP SERPL CALC-SCNC: 8 MMOL/L (ref 0–18)
APPEARANCE: CLEAR
BASOPHILS # BLD AUTO: 0.06 X10(3) UL (ref 0–0.2)
BASOPHILS NFR BLD AUTO: 0.5 %
BILIRUBIN: NEGATIVE
BUN BLD-MCNC: 25 MG/DL (ref 9–23)
BUN/CREAT SERPL: 21.9 (ref 10–20)
CALCIUM BLD-MCNC: 9.6 MG/DL (ref 8.7–10.4)
CHLORIDE SERPL-SCNC: 105 MMOL/L (ref 98–112)
CO2 SERPL-SCNC: 26 MMOL/L (ref 21–32)
CREAT BLD-MCNC: 1.14 MG/DL
DEPRECATED HBV CORE AB SER IA-ACNC: 70 NG/ML
DEPRECATED RDW RBC AUTO: 59.2 FL (ref 35.1–46.3)
EGFRCR SERPLBLD CKD-EPI 2021: 47 ML/MIN/1.73M2 (ref 60–?)
EOSINOPHIL # BLD AUTO: 0.28 X10(3) UL (ref 0–0.7)
EOSINOPHIL NFR BLD AUTO: 2.4 %
ERYTHROCYTE [DISTWIDTH] IN BLOOD BY AUTOMATED COUNT: 18.2 % (ref 11–15)
GLUCOSE (URINE DIPSTICK): NEGATIVE MG/DL
GLUCOSE BLD-MCNC: 155 MG/DL (ref 70–99)
HCT VFR BLD AUTO: 31.4 %
HGB BLD-MCNC: 9.5 G/DL
IMM GRANULOCYTES # BLD AUTO: 0.18 X10(3) UL (ref 0–1)
IMM GRANULOCYTES NFR BLD: 1.5 %
IRON SATN MFR SERPL: 11 %
IRON SERPL-MCNC: 36 UG/DL
KETONES (URINE DIPSTICK): NEGATIVE MG/DL
LEUKOCYTES: NEGATIVE
LYMPHOCYTES # BLD AUTO: 1.09 X10(3) UL (ref 1–4)
LYMPHOCYTES NFR BLD AUTO: 9.2 %
MAGNESIUM SERPL-MCNC: 1.4 MG/DL (ref 1.6–2.6)
MCH RBC QN AUTO: 26.8 PG (ref 26–34)
MCHC RBC AUTO-ENTMCNC: 30.3 G/DL (ref 31–37)
MCV RBC AUTO: 88.7 FL
MONOCYTES # BLD AUTO: 0.6 X10(3) UL (ref 0.1–1)
MONOCYTES NFR BLD AUTO: 5.1 %
MULTISTIX EXPIRATION DATE: YELLOW DATE
MULTISTIX LOT#: CLEAR NUMERIC
NEUTROPHILS # BLD AUTO: 9.61 X10 (3) UL (ref 1.5–7.7)
NEUTROPHILS # BLD AUTO: 9.61 X10(3) UL (ref 1.5–7.7)
NEUTROPHILS NFR BLD AUTO: 81.3 %
NITRITE, URINE: NEGATIVE
OCCULT BLOOD: NEGATIVE
OSMOLALITY SERPL CALC.SUM OF ELEC: 296 MOSM/KG (ref 275–295)
PH, URINE: 5.5 (ref 4.5–8)
PLATELET # BLD AUTO: 255 10(3)UL (ref 150–450)
POTASSIUM SERPL-SCNC: 4.3 MMOL/L (ref 3.5–5.1)
PROTEIN (URINE DIPSTICK): >=300 MG/DL
RBC # BLD AUTO: 3.54 X10(6)UL
SODIUM SERPL-SCNC: 139 MMOL/L (ref 136–145)
SPECIFIC GRAVITY: 1.02 (ref 1–1.03)
TIBC SERPL-MCNC: 323 UG/DL (ref 250–425)
TRANSFERRIN SERPL-MCNC: 217 MG/DL (ref 250–380)
URINE-COLOR: YELLOW
UROBILINOGEN,SEMI-QN: 0.2 MG/DL (ref 0–1.9)
WBC # BLD AUTO: 11.8 X10(3) UL (ref 4–11)

## 2024-12-12 PROCEDURE — 86480 TB TEST CELL IMMUN MEASURE: CPT

## 2024-12-12 PROCEDURE — 82728 ASSAY OF FERRITIN: CPT

## 2024-12-12 PROCEDURE — 84466 ASSAY OF TRANSFERRIN: CPT

## 2024-12-12 PROCEDURE — 36591 DRAW BLOOD OFF VENOUS DEVICE: CPT

## 2024-12-12 PROCEDURE — 80048 BASIC METABOLIC PNL TOTAL CA: CPT

## 2024-12-12 PROCEDURE — 83735 ASSAY OF MAGNESIUM: CPT

## 2024-12-12 PROCEDURE — 83540 ASSAY OF IRON: CPT

## 2024-12-12 PROCEDURE — 86038 ANTINUCLEAR ANTIBODIES: CPT | Performed by: INTERNAL MEDICINE

## 2024-12-12 PROCEDURE — 85025 COMPLETE CBC W/AUTO DIFF WBC: CPT

## 2024-12-12 RX ORDER — VALSARTAN 80 MG/1
TABLET ORAL
Qty: 270 TABLET | Refills: 3 | Status: SHIPPED | OUTPATIENT
Start: 2024-12-12

## 2024-12-12 NOTE — PROGRESS NOTES
HPI:    Patient ID: Chester Bautista is a 84 year old female.    Chester Bautista is a 84 year old female who presents for a complete physical exam.   Chester Bautista is a 84 year old female who presents for a Medicare Assessment.     No chief complaint on file.       Labs:   Lab Results   Component Value Date/Time    WBC 11.8 (H) 12/12/2024 02:09 PM    HGB 9.5 (L) 12/12/2024 02:09 PM    .0 12/12/2024 02:09 PM      Lab Results   Component Value Date/Time     (H) 12/12/2024 02:09 PM     12/12/2024 02:09 PM    K 4.3 12/12/2024 02:09 PM     12/12/2024 02:09 PM    CO2 26.0 12/12/2024 02:09 PM    CREATSERUM 1.14 (H) 12/12/2024 02:09 PM    CA 9.6 12/12/2024 02:09 PM    ALB 3.5 11/29/2024 02:41 PM    TP 7.2 10/17/2024 03:58 PM    ALKPHO 103 10/17/2024 03:58 PM    AST 31 11/29/2024 02:41 PM    ALT 14 11/29/2024 02:41 PM    BILT 0.2 10/17/2024 03:58 PM    TSH 0.641 11/29/2024 02:44 PM    T4F 1.1 11/29/2024 02:44 PM        Lab Results   Component Value Date/Time    CHOLEST 130 02/16/2024 01:43 PM    HDL 43 02/16/2024 01:43 PM    TRIG 181 (H) 02/16/2024 01:43 PM    LDL 57 02/16/2024 01:43 PM    NONHDLC 87 02/16/2024 01:43 PM       Lab Results   Component Value Date/Time    A1C 7.6 (A) 11/13/2024 01:22 PM      Lab Results   Component Value Date    VITD 13.0 (L) 11/01/2024         No recommendations at this time    Allergies:Allergies[1]  Current Outpatient Medications   Medication Sig Dispense Refill    Potassium Chloride ER 20 MEQ Oral Tab CR Take 40 mEq by mouth every morning. 60 tablet 1    loratadine 10 MG Oral Tab TAKE 1 TABLET BY MOUTH EVERY DAY 30 tablet 1    hydroxychloroquine 200 MG Oral Tab Take 1.5 tablets (300 mg total) by mouth daily. 135 tablet 1    predniSONE 5 MG Oral Tab Take 1 tablet (5 mg total) by mouth daily. 90 tablet 1    predniSONE 10 MG Oral Tab 30 mg daily x3 days then 20 mg daily x3 days then 10 mg daily x3 days then stop 20 tablet 0    albuterol (2.5  MG/3ML) 0.083% Inhalation Nebu Soln Take 3 mL (2.5 mg total) by nebulization every 6 (six) hours as needed for Wheezing. 360 mL 3    famotidine 20 MG Oral Tab Take 1 tablet (20 mg total) by mouth 2 (two) times daily. Patient gets over the counter      ciprofloxacin (CIPRO) 500 MG Oral Tab Take 1 tablet (500 mg total) by mouth 2 (two) times daily. (Patient not taking: Reported on 12/3/2024) 14 tablet 0    nitrofurantoin monohydrate macro 100 MG Oral Cap Take 1 capsule (100 mg total) by mouth 2 (two) times daily. (Patient not taking: Reported on 12/3/2024) 14 capsule 0    glipiZIDE 5 MG Oral Tab Take 1 tablet (5 mg total) by mouth every morning before breakfast. 90 tablet 0    Cholecalciferol (VITAMIN D3) 1.25 MG (65248 UT) Oral Cap 1 po [id number]. 2 capsule 4    valsartan 80 MG Oral Tab Take 1 tablet (80 mg total) by mouth 2 (two) times daily. 180 tablet 3    albuterol 108 (90 Base) MCG/ACT Inhalation Aero Soln TAKE 2 PUFFS BY MOUTH EVERY 4 TO 6 HOURS AS NEEDED 6.7 each 5    cyclobenzaprine 10 MG Oral Tab Take 1 tablet (10 mg total) by mouth nightly as needed. 90 tablet 1    BD PEN NEEDLE KADEN 2ND GEN 32G X 4 MM Does not apply Misc 1 EACH DAILY. USE DAILY WITH INSULIN 100 each 1    insulin glargine (LANTUS SOLOSTAR) 100 UNIT/ML Subcutaneous Solution Pen-injector Inject 10 Units into the skin daily with dinner. 3 mL 1    montelukast 10 MG Oral Tab Take 1 tablet (10 mg total) by mouth nightly. 90 tablet 3    carvedilol 12.5 MG Oral Tab Take 1 tablet (12.5 mg total) by mouth 2 (two) times daily with meals. 180 tablet 3    fluticasone-salmeterol (WIXELA INHUB) 250-50 MCG/ACT Inhalation Aerosol Powder, Breath Activated Inhale 1 puff into the lungs every 12 (twelve) hours. 1 each 5    Blood Glucose Monitoring Suppl (BLOOD GLUCOSE SYSTEM GILLES) Does not apply Kit Dx. E11.9. Checks qam. 1 kit 0    Blood Glucose Monitoring Suppl w/Device Does not apply Kit Dx. E11.9. Checks qam. 1 kit 0    Blood Gluc Meter Disp-Strips Does not  apply Device Dx. E11.9. Checks qam. 100 each 0    gabapentin 100 MG Oral Cap Take 1 capsule (100 mg total) by mouth nightly. 90 capsule 3    Accu-Chek FastClix Lancets Does not apply Misc Check sugar once daily as directed (E11.42) 100 each 2    rosuvastatin (CRESTOR) 20 MG Oral Tab Take 1 tablet (20 mg total) by mouth nightly. 90 tablet 0    Blood Glucose Monitoring Suppl (ONETOUCH VERIO) w/Device Does not apply Kit 1 each daily. Test 1x daily 1 kit 0    OneTouch Delica Lancets 33G Does not apply Misc 4 x daily 100 each 1    acetaminophen 500 MG Oral Tab Take 1 tablet (500 mg total) by mouth daily.        Past Medical History:    Anxiety state    Cancer (HCC)    breast cancer 2018    Chronic obstructive pulmonary disease, unspecified (HCC)    Chronic obstructive pulmonary disease, unspecified (HCC)    COPD (chronic obstructive pulmonary disease) (HCC)    Cystic thyroid nodule    Diabetes (HCC)    Diabetes mellitus (HCC)    Essential hypertension    Exposure to medical diagnostic radiation    High blood pressure    High cholesterol    History of blood transfusion    low hemoglobin    Osteoarthritis    PONV (postoperative nausea and vomiting)    Pulmonary embolism (HCC)    Pulmonary emphysema (HCC)    Rheumatoid arthritis (HCC)      Past Surgical History:   Procedure Laterality Date    Cataract extraction w/  intraocular lens implant Bilateral 2017    Dr in [facility]     Colonoscopy      Colonoscopy N/A 07/21/2022    Procedure: COLONOSCOPY;  Surgeon: Mikey Garcia MD;  Location: [facility] ENDOSCOPY    Colonoscopy N/A 06/15/2023    Procedure: COLONOSCOPY;  Surgeon: Mikey Garcia MD;  Location: [facility] ENDOSCOPY    Correct bunion,simple      right foot  1993    Egd  12/21/2023    Dr. Garcia; Duodenal AVM's x4 cauterized    Hysterectomy      1972, no abnormal Paps, due to heavy bleeding with fibroids.  Not sure if it had bilateral salpingo-oophorectomy.    Ir aneurysm repairm  2010    Computer assisted volumetric  craniofomy with clipping of anterior cerebral artery.    Lumpectomy right      Radiation right      Rotator cuff repair          Total knee replacement Right 2010    Total knee replacement Left 2015    Transoral incisionless fundoplication - internal  2012 Fredy fundoplication at [facility] Dr. Fournier.    Fredy fundoplication at [facility] Dr. Fournier.    Trigger finger release      left hand      Yag capsulotomy - ou - both eyes Bilateral 2021    done in [place]      Family History   Problem Relation Age of Onset    Heart Surgery Mother         Leaky valve at 41 y/o.     Prostate Cancer Brother     Cancer Brother     Diabetes Maternal Grandmother     Diabetes Paternal Aunt     Breast Cancer Self 78    Breast Cancer Niece         before 50    Macular degeneration Neg     Glaucoma Neg       Social History:  Social History     Socioeconomic History    Marital status:    Tobacco Use    Smoking status: Former     Current packs/day: 0.00     Types: Cigarettes     Quit date:      Years since quittin.9     Passive exposure: Never    Smokeless tobacco: Never    Tobacco comments:     [id number]   Vaping Use    Vaping status: Never Used   Substance and Sexual Activity    Alcohol use: Never    Drug use: Never       History/Other:     Patient Active Problem List   Diagnosis    Adult general medical examination    Vaccine counseling    Colon cancer screening    Type 2 diabetes mellitus without retinopathy (HCC)    Sarcoidosis    Anemia, iron deficiency    Dyslipidemia    Hypercalcemia    Hypertension    Infiltrating ductal carcinoma of right breast, stage 1 (HCC)    Rheumatoid arthritis (HCC)    Osteoarthrosis    Peripheral vascular disease due to secondary diabetes mellitus (HCC)    Neuropathy    AVM (arteriovenous malformation) of duodenum, acquired    Pseudophakia of both eyes    Vitreous floaters of both eyes    Glaucoma suspect of both eyes    Iron deficiency  anemia due to chronic blood loss    Malabsorption of iron (HCC)    Port-A-Cath in place    Chronic obstructive pulmonary disease, unspecified (HCC)    Adnexal mass    Encounter for care related to vascular access port    High risk medication use    Meibomian gland dysfunction (MGD) of both eyes    Hypokalemia    Hyperkalemia    Hypomagnesemia    Constipation    Gastric AVM    AVM (arteriovenous malformation) (HCC)    Leukocytosis    Presence of IVC filter    CKD (chronic kidney disease) stage 3, GFR 30-59 ml/min ([facility])    Dry eye syndrome of both eyes    Vitamin D deficiency    Cystic thyroid nodule       GYNE HISTORY:  OB History    Para Term  AB Living   2 2 0 0 0 2   SAB IAB Ectopic Multiple Live Births   0 0 0 0 0        No LMP recorded. Patient has had a hysterectomy.    Hx of Pap: all Paps normal           Patient here for follow up of Diabetes.  Has been taking medications regularly.  CGM.   Checks sugars several times daily.  Has a CGM machine.  On prednisone.  Was high before prednisone but seems to be even higher since on prednisone.  Fasting sugars average 330's.  Occ. 2 AM a 51 but not feel. ? Sensor off.    Likes ice cream in the evening.  Will have 2 servings of ice cream.  Diabetic Flow sheet      2024     4:46 PM 2024     3:42 PM 2024     3:28 PM 12/3/2024     3:54 PM   DIABETIC FLOWSHEET (EE)   BMI  27.36 kg/m2  27.59 kg/m2   Valsartan 80 mg BID OR 80 mg BID OR  80 mg BID OR  80 mg BID OR    Weight (enc vitals)  144 lb 12.8 oz  146 lb   BP (enc vitals)  149/80  157/78   PHQ2 Score   0       Hypertension  Patient is here for follow up of hypertension. BP at home: Runs similar at home.  Better than where she was.  Has been compliant with medications.  Exercise level: not active and has not been following low salt diet.  Weight has been stable.  Wt Readings from Last 3 Encounters:   24 144 lb 12.8 oz (65.7 kg)   24 146 lb (66.2 kg)   24 144 lb (65.3 kg)      BP Readings from Last 3 Encounters:   24 149/80   24 157/78   24 (!) 176/77     Labs:   Lab Results   Component Value Date/Time     (H) 2024 02:09 PM     2024 02:09 PM    K 4.3 2024 02:09 PM     2024 02:09 PM    CO2 26.0 2024 02:09 PM    CREATSERUM 1.14 (H) 2024 02:09 PM    CA 9.6 2024 02:09 PM    AST 31 2024 02:41 PM    ALT 14 2024 02:41 PM    TSH 0.641 2024 02:44 PM    T4F 1.1 2024 02:44 PM        Lab Results   Component Value Date/Time    CHOLEST 130 2024 01:43 PM    HDL 43 2024 01:43 PM    TRIG 181 (H) 2024 01:43 PM    LDL 57 2024 01:43 PM    NONHDLC 87 2024 01:43 PM            Tobacco:  She smoked tobacco in the past but quit greater than 12 months ago.  Social History     Tobacco Use   Smoking Status Former    Current packs/day: 0.00    Types: Cigarettes    Quit date:     Years since quittin.    Passive exposure: Never   Smokeless Tobacco Never   Tobacco Comments    [id number]        CAGE Alcohol Screen:   CAGE screening score of 0 on 2024, showing low risk of alcohol abuse.        Patient Care Team:  Heike Sorto MD as PCP - General (Internal Medicine)  Ashia Goodrich MD as Consulting Physician (RHEUMATOLOGY)  Chapin Parker MD as Consulting Physician (PULMONARY DISEASES)  Yakov Carbajal DPM as Consulting Physician (PODIATRIST)  Vikki Hall MD (OBSTETRICS & GYNECOLOGY)  Mikey Garcia MD (GASTROENTEROLOGY)  Eric Mohamud MD as Consulting Physician (Hematology and Oncology)  Hannah Encinas MD as Consulting Physician (NEPHROLOGY)  Diarrhea   This is a chronic problem. The current episode started more than 1 month ago. The problem occurs 2 to 4 times per day. The problem has been unchanged. The stool consistency is described as Watery. The patient states that diarrhea does not awaken her from sleep. Associated symptoms include bloating.  Pertinent negatives include no abdominal pain, chills, coughing, fever, headaches, increased  flatus, myalgias, sweats, URI or vomiting. Exacerbated by: Eating.  Not sure if certain foods aggravate.  But every time he eats has to go to the bathroom. There are no known risk factors. She has tried nothing for the symptoms.       Review of Systems   Constitutional:  Negative for activity change, appetite change, chills, diaphoresis, fatigue, fever and unexpected weight change.   HENT:  Negative for congestion, dental problem, drooling, ear discharge, ear pain, facial swelling, hearing loss, mouth sores, nosebleeds, postnasal drip, rhinorrhea, sinus pressure, sinus pain, sneezing, sore throat, tinnitus, trouble swallowing and voice change.    Eyes:  Negative for photophobia, pain, discharge, redness, itching and visual disturbance.   Respiratory:  Negative for apnea, cough, choking, chest tightness, shortness of breath, wheezing and stridor.    Cardiovascular:  Negative for chest pain, palpitations and leg swelling.   Gastrointestinal:  Positive for bloating and diarrhea. Negative for abdominal distention, abdominal pain, anal bleeding, blood in stool, constipation, flatus, nausea, rectal pain and vomiting.        Every time he eats has loose bowel movement.  No blood no black tarry stools no watery.   Endocrine: Negative for cold intolerance, heat intolerance, polydipsia, polyphagia and polyuria.   Genitourinary:  Negative for decreased urine volume, difficulty urinating, dysuria, flank pain, frequency, hematuria, menstrual problem, pelvic pain, urgency, vaginal bleeding, vaginal discharge and vaginal pain.   Musculoskeletal:  Negative for myalgias.   Skin:  Negative for rash.   Neurological:  Negative for dizziness, tremors, seizures, syncope, facial asymmetry, speech difficulty, weakness, light-headedness, numbness and headaches.   Psychiatric/Behavioral:  Negative for agitation, behavioral problems, confusion,  decreased concentration, dysphoric mood, hallucinations, self-injury, sleep disturbance and suicidal ideas. The patient is not nervous/anxious and is not hyperactive.    All other systems reviewed and are negative.          Functional Ability/Status:   Chester Bautista has some abnormal functions as listed below:  She has Driving difficulties based on screening of functional status. She has Meal Preparation difficulties based on screening of functional status.She has difficulties Managing Money/Bills based on screening of functional status.She has difficulties Shopping for Groceries based on screening of functional status. She has Vision problems based on screening of functional status. She has Walking problems based on screening of functional status. She has problems with Daily Activities based on screening of functional status. She has problems with Memory based on screening of functional status.         Fall Risk Assessment:   She has been screened for Falls and is High Risk. Fall Prevention information provided to patient in After Visit Summary.    Do you feel unsteady when standing or walking?: Yes  Do you worry about falling?: Yes  Have you fallen in the past year?: No       Medicare Hearing Assessment:   Hearing Screening    Time taken: 12/12/2024  3:50 PM  Entry User: Corinne Gorman  Screening Method: Finger Rub  Finger Rub Result: Pass         Depression Screening (PHQ-2/PHQ-9): PHQ-2 SCORE: 0  , done 12/12/2024          Cognitive Assessment:   She had a completely normal cognitive assessment - see flowsheet entries       Supplementary Documentation:     In the past six months, have you lost more than 10 pounds without trying?: 2 - No  Has your appetite been poor?: Yes  Type of Diet: Other  How does the patient maintain a good energy level?: Stretching  How would you describe your daily physical activity?: Light  How would you describe your current health state?: Fair  How do you maintain positive  mental well-being?: Visiting Family  On a scale of 0 to 10, with 0 being no pain and 10 being severe pain, what is your pain level?: 4 - (Moderate)  In the past six months, have you experienced urine leakage?: 1-Yes  At any time do you feel concerned for the safety/well-being of yourself and/or your children, in your home or elsewhere?: No  Have you had any immunizations at another office such as Influenza, Hepatitis B, Tetanus, or Pneumococcal?: No    Visual Acuity:   Right Eye Visual Acuity: Uncorrected Right Eye Chart Acuity: 20/30   Left Eye Visual Acuity: Uncorrected Left Eye Chart Acuity: 20/30     Both Eyes Chart Acuity: 20/30   Able To Tolerate Visual Acuity: Yes        PHYSICAL EXAM:   /80 (BP Location: Left arm, Patient Position: Sitting, Cuff Size: adult)   Pulse 84   Temp 97.2 °F (36.2 °C) (Temporal)   Ht 5' 1\" (1.549 m)   Wt 144 lb 12.8 oz (65.7 kg)   SpO2 100%   BMI 27.36 kg/m²   BP Readings from Last 3 Encounters:   12/12/24 149/80   12/03/24 157/78   11/22/24 (!) 176/77     Wt Readings from Last 3 Encounters:   12/12/24 144 lb 12.8 oz (65.7 kg)   12/03/24 146 lb (66.2 kg)   11/22/24 144 lb (65.3 kg)      Body mass index is 27.36 kg/m².   Physical Exam  Vitals and nursing note reviewed.   Constitutional:       General: She is not in acute distress.     Appearance: Normal appearance. She is well-developed and well-groomed. She is not ill-appearing, toxic-appearing or diaphoretic.      Interventions: She is not intubated.  HENT:      Head: Normocephalic and atraumatic.      Right Ear: Tympanic membrane, ear canal and external ear normal. No decreased hearing noted. No laceration, drainage, swelling or tenderness. No middle ear effusion. There is no impacted cerumen. No foreign body. No mastoid tenderness. No PE tube. No hemotympanum. Tympanic membrane is not injected, scarred, perforated, erythematous, retracted or bulging. Tympanic membrane has normal mobility.      Left Ear: Tympanic  membrane, ear canal and external ear normal. No decreased hearing noted. No laceration, drainage, swelling or tenderness.  No middle ear effusion. There is no impacted cerumen. No foreign body. No mastoid tenderness. No PE tube. No hemotympanum. Tympanic membrane is not injected, scarred, perforated, erythematous, retracted or bulging. Tympanic membrane has normal mobility.      Nose:      Right Sinus: No maxillary sinus tenderness or frontal sinus tenderness.      Left Sinus: No maxillary sinus tenderness or frontal sinus tenderness.      Mouth/Throat:      Lips: Pink. No lesions.      Mouth: Mucous membranes are moist. No injury, lacerations, oral lesions or angioedema.      Dentition: No dental tenderness, gingival swelling, dental abscesses or gum lesions.      Tongue: No lesions. Tongue does not deviate from midline.      Palate: No mass and lesions.      Pharynx: Oropharynx is clear. Uvula midline. No pharyngeal swelling, oropharyngeal exudate, posterior oropharyngeal erythema or uvula swelling.      Tonsils: No tonsillar exudate or tonsillar abscesses.   Eyes:      General: Lids are normal. Gaze aligned appropriately. No scleral icterus.        Right eye: No foreign body, discharge or hordeolum.         Left eye: No foreign body, discharge or hordeolum.      Extraocular Movements: Extraocular movements intact.      Right eye: Normal extraocular motion and no nystagmus.      Left eye: Normal extraocular motion and no nystagmus.      Conjunctiva/sclera: Conjunctivae normal.      Right eye: Right conjunctiva is not injected. No chemosis, exudate or hemorrhage.     Left eye: Left conjunctiva is not injected. No chemosis, exudate or hemorrhage.     Pupils: Pupils are equal, round, and reactive to light.   Neck:      Thyroid: No thyroid mass, thyromegaly or thyroid tenderness.      Vascular: Normal carotid pulses. No carotid bruit, hepatojugular reflux or JVD.      Trachea: Trachea and phonation normal. No tracheal  tenderness, tracheostomy, abnormal tracheal secretions or tracheal deviation.     Cardiovascular:      Rate and Rhythm: Normal rate and regular rhythm.      Pulses: Normal pulses.           Carotid pulses are 2+ on the right side and 2+ on the left side.       Radial pulses are 2+ on the right side and 2+ on the left side.        Dorsalis pedis pulses are 2+ on the right side and 2+ on the left side.        Posterior tibial pulses are 2+ on the right side and 2+ on the left side.      Heart sounds: Normal heart sounds, S1 normal and S2 normal.   Pulmonary:      Effort: Pulmonary effort is normal. No tachypnea, bradypnea, accessory muscle usage, prolonged expiration, respiratory distress or retractions. She is not intubated.      Breath sounds: Normal breath sounds and air entry. No stridor, decreased air movement or transmitted upper airway sounds. No decreased breath sounds, wheezing, rhonchi or rales.   Chest:      Chest wall: No tenderness.   Breasts:     Breasts are symmetrical.      Right: No swelling, bleeding, inverted nipple, mass, nipple discharge, skin change or tenderness.      Left: No swelling, bleeding, inverted nipple, mass, nipple discharge, skin change or tenderness.   Abdominal:      General: Bowel sounds are normal. There is no distension.      Palpations: Abdomen is soft. Abdomen is not rigid. There is no fluid wave, hepatomegaly, splenomegaly or mass.      Tenderness: There is no abdominal tenderness. There is no right CVA tenderness, left CVA tenderness, guarding or rebound.   Genitourinary:     Exam position: Supine.   Musculoskeletal:      Cervical back: Neck supple. No tenderness.      Right lower leg: No edema.      Left lower leg: No edema.   Lymphadenopathy:      Head:      Right side of head: No submental, submandibular, preauricular, posterior auricular or occipital adenopathy.      Left side of head: No submental, submandibular, preauricular, posterior auricular or occipital  adenopathy.      Cervical: No cervical adenopathy.      Right cervical: No superficial, deep or posterior cervical adenopathy.     Left cervical: No superficial, deep or posterior cervical adenopathy.      Upper Body:      Right upper body: No supraclavicular, axillary or pectoral adenopathy.      Left upper body: No supraclavicular, axillary or pectoral adenopathy.   Skin:     General: Skin is warm and dry.      Coloration: Skin is not pale.      Findings: No erythema or rash.   Neurological:      Mental Status: She is alert and oriented to person, place, and time.   Psychiatric:         Mood and Affect: Mood normal.         Speech: Speech normal.         Behavior: Behavior normal. Behavior is cooperative.     Bilateral barefoot skin diabetic exam is normal, visualized feet and the appearance is normal.  Bilateral monofilament sensation of both feet is normal.  Pulsation pedal pulse exam of both lower legs/feet is normal as well.            ASSESSMENT/PLAN:     Encounter Diagnoses   Name Primary?    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  No surgical is needed per OB.   Yes    Adult general medical examination       Other iron deficiency anemia hemoglobin stable.       AVM (arteriovenous malformation) of duodenum, acquired no rectal bleeding nor black tarry stool.  Hemoglobin stable.       Other emphysema (HCC) using nebulizer twice a day and slight improvement.  Has also been using [place] 250/51 puff twice a day with rinsing and spitting after each puff.  Has a follow-up with pulmonary.  Will change nebulizer to DuoNeb to see if it will help with congestion.       Stage 3a chronic kidney disease (HCC) No motrin, ibuprofen, advil, alleve, naprosyn  with these medications.  Hydrate at least 48 ounces of water a day.  Check blood.       Colon cancer screening up-to-date with screening.       Constipation, unspecified constipation type can try Kellog All-Bran buds 1 to 2 tablespoons a day.  Put  it in cereal or either it straight or can do it with a smoothie or in yogurt.       Dry eye syndrome of both eyes stable.       Dyslipidemia check blood.       Gastric AVM no evidence of acute bleed.       Glaucoma suspect of both eyes no new vision changes.  Follow-up with ophthalmology.       Hypercalcemia check blood.       Hyperkalemia resolved.       Infiltrating ductal carcinoma of right breast, stage 1 (HCC)       Hypomagnesemia will increase magnesium to 1 tablet twice a day try magnesium sulfate or magnesium glycinate.  Since not sure if magnesium is aggravating diarrhea.  Hydrate at least 48 ounces of water a day.  Can use Imodium as needed.  Will check magnesium level in 1 week.  Follow-up with renal.       Hypokalemia resolved.  But hard to take the potassium tablets.  Will try to decrease potassium to 120 mill equivalent tablet a day.  Recheck blood work in 1 week.       Primary hypertension higher.  Increase valsartan.  Call in 2 weeks with some blood pressures.Careful with diet and excercise at least 30 minutes 3-4 times a week. Check blood pressures at different times on different days. Can purchase own blood pressure monitor. If not, check at local pharmacy. Bake foods more and grill occasionally. Avoid fried foods. No salt. Use other seasonings.         Peripheral vascular disease due to secondary diabetes mellitus (HCC)       Primary osteoarthritis involving multiple joints stable.       Neuropathy stable.  Check feet every morning.  Watch for ulcers or sores.  Wear shoes in the house.  Good comfortable shoes.  Watch also for signs and symptoms of infection.       Meibomian gland dysfunction (MGD) of both eyes stable.       Malabsorption of iron (HCC) hemoglobin stable.  No evidence of more bleeding.       Other elevated white blood cell (WBC) count slightly elevated.  Follow-up with hematology.  Not sure if from anemia?       Iron deficiency anemia due to chronic blood loss hemoglobin stable.  No  evidence of acute blood loss.       Vitreous floaters of both eyes stable.       Vitamin D deficiency check vitamin D.       Vaccine counseling recommend COVID booster.  Can that at the local pharmacy or through [facility] office.  Holding on Shingrix vaccine for now.       Type 2 diabetes mellitus without retinopathy (HCC) Higher but lows. ? Sensor issues? Call in 1 week with fingersticks. Careful with diet and excercise at least 30 minutes 3-4 times a week. Check sugars at different times on different dates. Careful with low sugars. Carry something with you and check sugar if can. Can carry hanh cracker, etc. Decrease carbohydrates. But also, careful with fruits and natural sugars.One serving a day and no more than 1 handful every day. Check feet  every AM and careful with sores and ulcers on feet bilaterally. Check eyes every year with dilated eye exam.  Check sugars.  2-hour postmeal should be less than 140s.  Pre-meal should be 's.  Both equally affected A1c.  Discussed importance of glycemic control to prevent complications of diabetes  -Discussed complications of diabetes include retinopathy, neuropathy, nephropathy and cardiovascular disease  -Discussed ABCs of DM  -Discussed importance of SBGM  -Discussed importance of low CHO diet, recommend 45gm per meal or 135gm per day  -Normal foot exam  -UTD with optho  -Normotensive        Sarcoidosis stable.       Pseudophakia of both eyes stable.       Presence of IVC filter       Port-A-Cath in place       Cystic thyroid nodule check blood.    CONCLUSION:  1. Multiple cystic nodules are demonstrated throughout the thyroid gland, not significant changed.     2. None of the lesions appear to have characteristics mandated tissue sampling at present, but continued surveillance is recommended.         Diarrhea. Increase magnesium to sulfate. Check blood in 1 week. Decrease potassium to one a day. Check blood in 1 week. Use immodium as needed. But careful not  too much. Wash hands thoroughly. BRAT (Bananas, rice apples, tea, bread, etc). [place] diet. Avoid spicey foods. Avoid dairy for 1 week. OK for yogurt. Increase probiotics.  Try [place]'s all bran buds 1-2 tbsp. A day.       Orders Placed This Encounter   Procedures    Comp Metabolic Panel (14)    URINALYSIS, AUTO, W/O SCOPE    Magnesium    Basic Metabolic Panel (8)       Meds This Visit:  Requested Prescriptions      No prescriptions requested or ordered in this encounter       Imaging & Referrals:  None      Return to clinic in 3 months for follow-up.    1. Adnexal mass (Primary)  2. Adult general medical examination  3. Other iron deficiency anemia  Overview:   chronic iron deficiency anemia in the setting of bleeding AVMs s/p repeat endoscopic treatment, Iv iron treatment and recent RBC transfusion for hgb of 6 on 5/2022. EGD/CLN Dr Garcia 7/2022 noted for 2 AVM in duodenum and 1 AVM in the colon s/p treatment.  There is ongoing concern for more AVMs as well need for chronic monitoring, infusions etc.     4. AVM (arteriovenous malformation) of duodenum, acquired  Overview:  7-22: Colonoscopy showed 1 AVM ( treated) and one colon polyp.     The upper endoscopy showed 2 AVMs ( both treated)   These are the cause of your anemia ( low blood counts)     There may be more AVMs in the small intestine.  Plan to monitor blood counts.     5. Other emphysema (HCC)  6. Stage 3a chronic kidney disease (HCC)  -     Comp Metabolic Panel (14)  -     URINALYSIS, AUTO, W/O SCOPE  7. Colon cancer screening  8. Constipation, unspecified constipation type  9. Dry eye syndrome of both eyes  10. Dyslipidemia  11. Gastric AVM  Overview:  DA from AVM's     - previously CAPUTO has been a hallmark for symptomatic anemia for her, also has worsening fatigue and some lightheadedness  --pt received venofer in 4/24 and currently feels improved  --current ferritin level is over 50, so she does not need IV iron  --hgb has been stable~10; f//u in 8  weeks   --pt has improved symptoms with monthly octreotide by Dr. Garcia in GI for continued blood loss; continue care and management     -11/22/24 Last IV iron 10/9-10/31/24 (Venofer) .   She continues monthly octreotide by GI for GI bleeding concerns.   Prudencio's dyspnea on exertion are stable, but no chest pain. Reports worsening fatigue.   Pt denies any systemic signs of illness.   Patient denies blood loss from any orifice that she can see.      11/22/24 HGB 9.5, pending ferritin and iron studies. Consider IV iron if needed.  Plan for labs in 4 weeks and follow up in 8 weeks.  12. Glaucoma suspect of both eyes  13. Hypercalcemia  14. Hyperkalemia  15. Infiltrating ductal carcinoma of right breast, stage 1 (HCC)  Overview:  history of breast cancer dating to 2016 status postlumpectomy and radiation.    16. Hypomagnesemia  17. Hypokalemia  -     Comp Metabolic Panel (14)  -     Magnesium; Future; Expected date: 12/26/2024  -     Basic Metabolic Panel (8); Future; Expected date: 12/16/2024  18. Primary hypertension  19. Peripheral vascular disease due to secondary diabetes mellitus (HCC)  20. Primary osteoarthritis involving multiple joints  21. Neuropathy  Overview:  6-22: EMG and NCV consistent with a sensorimotor peripheral neuropathy probably related to diabetes.  Would be reasonable to complete metabolic work-up which should include vitamin B12, folic acid, CLEMENT, serum electrophoresis.  22. Meibomian gland dysfunction (MGD) of both eyes  23. Malabsorption of iron (HCC)  24. Other elevated white blood cell (WBC) count  25. Iron deficiency anemia due to chronic blood loss  Overview:  chronic iron deficiency anemia in the setting of bleeding AVMs s/p repeat endoscopic treatment, Iv iron treatment and recent RBC transfusion for hgb of 6 on 5/2022. EGD/CLN Dr Garcia 7/2022 noted for 2 AVM in duodenum and 1 AVM in the colon s/p treatment.  There is ongoing concern for more AVMs as well need for chronic monitoring,  infusions etc.   82 year old with chronic iron deficiency anemia in the setting of bleeding AVMs s/p repeat endoscopic treatment, Iv iron treatment and recent RBC transfusion 2022  - Recommend PRN iron infusions to keep iron sat above 20% and ferritin above 100.     - proceed with labs today and iron infusions as above.       Will try to keep hgb above 9 if possible, hopefully can avoid octreotide although can be considered if she has repeat admissions due to symptomatic acute blood loss anemia  26. Vitreous floaters of both eyes  27. Vitamin D deficiency  28. Vaccine counseling  29. Type 2 diabetes mellitus without retinopathy (HCC)  30. Sarcoidosis  Overview:  In September she developed a cough and shortness of breath.  Per patient she was seen by pulmonology and they diagnosed her with sarcoidosis based on her chest x-ray and CAT scan.  No prior history of sarcoidosis.  No history of uveitis, erythema nodosum, fevers or night sweats.  She does report weight loss about 20 pounds from February till November but she has not been eating well and her  passed away recently.  Her prednisone was increased to 5 mg daily but now she is tapered down to 3 mg daily. Do not have records of her chest x-ray or CT scan.  Cough has resolved.  Has minimal shortness of breath.  Per patient she was diagnosed in [place] and September by a pulmonologist due to her shortness of breath and cough.  She had a chest x-ray and CT scan per patient.  Do not have those records.  No biopsy was done.  - At that time her prednisone was increased to 5 mg daily now she is down to 3 mg daily  - Has no other evidence of uveitis, erythema nodosum, fevers or night sweats.  She does report about a 20 pound weight loss since February but has been under a lot of stress as her  recently   She had been living in [facility].  She had a cough several months ago and was evaluated by pulmonologist who told her that she may have  sarcoidosis.  Reportedly a chest x-ray was performed at that time.   31. Pseudophakia of both eyes  32. Presence of IVC filter  33. Port-A-Cath in place  34. Cystic thyroid nodule      The patient indicates understanding of these issues and agrees to the plan.  Consult ordered.  Further testing ordered.  Imaging studies ordered.  Lab work ordered.  Reinforced healthy diet, lifestyle, and exercise.      No follow-ups on file.    Advanced Directives:   She does NOT have a Living Will. [Do you have a living will?: No]  She does NOT have a Power of  for Health Care. [Do you have a healthcare power of ?: No]  Discussed Advance Care Planning with patient (and family/surrogate if present). Standard forms made available to patient in After Visit Summary.  16+ minutes was spent with all Advanced Care Planning elements today (up to 30 minutes for CPT 54113)    MD Chester Beal's SCREENING SCHEDULE   Tests on this list are recommended by your physician but may not be covered, or covered at this frequency, by your insurer.   Please check with your insurance carrier before scheduling to verify coverage.   PREVENTATIVE SERVICES FREQUENCY &  COVERAGE DETAILS LAST COMPLETION DATE   Diabetes Screening    Fasting Blood Sugar /  Glucose    One screening every 12 months if never tested or if previously tested but not diagnosed with pre-diabetes   One screening every 6 months if diagnosed with pre-diabetes Lab Results   Component Value Date     (H) 12/12/2024        Cardiovascular Disease Screening    Lipid Panel  Cholesterol  Lipoprotein (HDL)  Triglycerides Covered every 5 years for all Medicare beneficiaries without apparent signs or symptoms of cardiovascular disease Lab Results   Component Value Date    CHOLEST 130 02/16/2024    HDL 43 02/16/2024    LDL 57 02/16/2024    TRIG 181 (H) 02/16/2024         Electrocardiogram (EKG)   Covered if needed at Welcome to Medicare, and  non-screening if indicated for medical reasons 10/23/2024      Ultrasound Screening for Abdominal Aortic Aneurysm (AAA) Covered once in a lifetime for one of the following risk factors    Men who are 65-75 years old and have ever smoked    Anyone with a family history -     Colorectal Cancer Screening  Covered for ages 50-85; only need ONE of the following:    Colonoscopy   Covered every 10 years    Covered every 2 years if patient is at high risk or previous colonoscopy was abnormal 06/15/2023    No recommendations at this time    Flexible Sigmoidoscopy   Covered every 4 years -    Fecal Occult Blood Test Covered annually -   Bone Density Screening    Bone density screening    Covered every 2 years after age 65 if diagnosed with risk of osteoporosis or estrogen deficiency.    Covered yearly for long-term glucocorticoid medication use (Steroids) Last Dexa Scan:    XR DEXA BONE DENSITOMETRY (CPT=77080) 04/21/2023      No recommendations at this time   Pap and Pelvic    Pap   Covered every 2 years for women at normal risk; Annually if at high risk 12/22/2022  No recommendations at this time    Chlamydia Annually if high risk -  No recommendations at this time   Screening Mammogram    Mammogram     Recommend annually for all female patients aged 40 and older    One baseline mammogram covered for patients aged 35-39 04/21/2023    No recommendations at this time    Immunizations    Influenza Covered once per flu season  Please get every year -  No recommendations at this time    Pneumococcal Each vaccine ([id number] & [id number]) covered once after 65 Prevnar 13: 03/11/2022    Vzvxchuso91: 05/31/2023     No recommendations at this time    Hepatitis B One screening covered for patients with certain risk factors   -  No recommendations at this time    Tetanus Toxoid Not covered by Medicare Part B unless medically necessary (cut with metal); may be covered with your pharmacy prescription benefits -    Tetanus, Diptheria and  Pertusis TD and TDaP Not covered by Medicare Part B -  No recommendations at this time    Zoster Not covered by Medicare Part B; may be covered with your pharmacy  prescription benefits -  Zoster Vaccines(1 of 2) Never done     Diabetes      Hemoglobin A1C Annually; if last result is elevated, may be asked to retest more frequently.    Medicare covers every 3 months Lab Results   Component Value Date     (H) 02/02/2024    A1C 7.6 (A) 11/13/2024       No recommendations at this time    Creat/alb ratio Annually Lab Results   Component Value Date    MICROALBCREA 994.7 (H) 10/25/2024       LDL Annually Lab Results   Component Value Date    LDL 57 02/16/2024       Dilated Eye Exam Annually Last Diabetic Eye Exam:  Last Dilated Eye Exam: 07/17/24  Eye Exam shows Diabetic Retinopathy?: No       Annual Monitoring of Persistent Medications (ACE/ARB, digoxin diuretics, anticonvulsants)    Potassium Annually Lab Results   Component Value Date    K 4.3 12/12/2024         Creatinine   Annually Lab Results   Component Value Date    CREATSERUM 1.14 (H) 12/12/2024         BUN Annually Lab Results   Component Value Date    BUN 25 (H) 12/12/2024       Drug Serum Conc Annually No results found for: \"DIGOXIN\", \"DIG\", \"VALP\"           Chronic Obstructive Pulmonary Disease (COPD)    Spirometry Annually Spirometry date:                Understanding Advance Care Planning  Advance care planning is the process of deciding one’s own future medical care. It helps assure that if you can’t speak for yourself, your wishes can still be carried out. The plan is a series of legal documents that note a person’s wishes. The documents vary by state. Advance care planning should be discussed at a regular office visit with your primary care provider before an acute illness. Advance care planning is encouraged when a person has a serious illness that is expected to get worse. It may also be done before major surgery. And it can help you and your  family be prepared in case of a major illness or injury. Advance care planning helps with making decisions at these times.     Who will speak on your behalf?  A healthcare proxy is a person who acts as the voice of a patient when the patient can’t speak for himself or herself. The name of this role varies by state. It may be called a Durable Medical Power of  or Durable Power of  for Healthcare. It may be called an agent, surrogate, or advocate. Or it may be called a representative or decision maker. It's an official duty that is identified by a legal document. The document also varies by state.   Why is advance care planning important?   If a person communicates his or her healthcare wishes:   He or she will be given medical care that matches his or her values and goals.  Family members will not be forced to make decisions in a crisis with no guidance.  Creating a plan  Making an advance care plan is often done in 3 steps:   Thinking about one’s wishes. To create an advance care plan, think about what kind of medical treatment you would want if you lose the ability to communicate. Are there any situations in which you would refuse or stop treatment? Are there therapies you would want or not want? And whom do you want to make decisions for you? There are many places to learn more about how to plan for your care. Ask your healthcare provider or  for resources.  Picking a healthcare proxy. This means choosing a trusted person to speak for you only when you can’t speak for yourself. When you can't make medical decisions, your proxy makes sure the instructions in your advance care plan are followed. A proxy doesn't make decisions based on his or her own opinions. They must put aside those opinions and values if needed, and carry out your wishes.  Filling out the legal documents. There are several kinds of legal documents for advance care planning. Each one tells healthcare providers your  wishes. The documents may vary by state. They must be signed and may need to be witnessed or notarized. You can cancel or change them whenever you wish. Depending on your state, the documents may include a Healthcare Proxy form, Living Will, Durable Medical Power of , and Advance Directive.  The family’s role  The best help a family can give is to support their loved one’s wishes. Open and honest communication is vital. Family should express any concerns they have about the patient’s choices while the patient can still make decisions in the event that his or her illness prevents communicating those wishes at a later time.    [employer] last reviewed this educational content on 12/1/2019    © 2915-8562 [employer]. All rights reserved. This information is not intended as a substitute for professional medical care. Always follow your healthcare professional's instructions.          Advance Medical Directive  An advance medical directive is a form that lets you plan ahead for the care you’d want if you could no longer express your wishes. This statement outlines the medical treatment you’d want or names the person you’d wish to make healthcare decisions for you. Be aware that laws vary from state to state, and it may be worthwhile to talk with an .     Writing down your wishes  An advance directive is important whether you’re young or old. Injury or illness can strike at any age.  Decide what is important to you and the kind of treatment you’d want, or not want to have.  Some states allow only one kind of advance directive. Some let you do both a durable power of  for healthcare and a living will. Some states put both kinds on the same form.    A durable power of  (POA) for healthcare   This form lets you name someone else that you have chosen and trust to speak and make decisions on your behalf.  This person can decide on treatment for you only when you can’t speak for  yourself.  You don't need to be at the end of your life. They could speak for you if you were in a coma but were likely to recover.    A living will  This form lets you list the care you want at the end of your life.  A living will applies only if you won’t live without medical treatment. It would apply if you had advanced cancer, a massive stroke, or other serious illness from which you will not recover.  It takes effect only when you can no longer express your wishes yourself.    [employer] last reviewed this educational content on 2/1/2021 © 2000-2021 [employer]. All rights reserved. This information is not intended as a substitute for professional medical care. Always follow your healthcare professional's instructions.          Choosing an Agent  A durable power of  for healthcare is only as good as the person you name to be your agent. Your agent is the person you have chosen to speak and make decisions on your behalf. If this person knows your treatment wishes and is willing to carry them out, you’ll probably be well represented. Be sure to tell your agent what’s important to you.     Who to choose  Here are suggestions for choosing an agent:  You can name a family member, close friend, , , or rabbi.  You should name one person as your agent. Then name one or two alternates. You need a backup person in case your first choice can’t be reached when needed.  Talk with each person you're thinking of naming as your agent or alternate. Do this before you decide who should carry out your wishes.  Your agent should be ...  A competent adult, age 18 or older  Someone you trust and can talk to about the care you want and what's important to you  Someone who supports your treatment choices    In many states, your agent can’t be ...   Your healthcare provider  An employee of your provider or of a hospital, nursing home, or hospice program where you receive care  Some states have other  restrictions on who can be named as an agent for an advance directive.   Be prepared  Tip: It's a good idea to write down your wishes and give a copy to your agent and all others who are involved with your healthcare.   [employer] last reviewed this educational content on 8/1/2021 © 2000-2021 [employer]. All rights reserved. This information is not intended as a substitute for professional medical care. Always follow your healthcare professional's instructions.          Understanding DNR Orders  Do not resuscitate (DNR) orders tell hospital staff not to do potentially life-restoring measures, such as CPR, if you or your loved one's heart and lungs stop working. It allows a natural death, and prevents healthcare staff from artificially reviving a person and placing them on life support. In many states, a DNR order also applies to staff outside the hospital such as in nursing homes and emergency medical services. A DNR order must be written by a healthcare provider. Or in some cases, certain other healthcare workers write it. This can only be done with the person’s or family’s consent. If a person has not written an advance directive, their family will decide on a DNR with the help of the healthcare team.   The person can cancel a DNR order at any time. The healthcare team can answer questions about the DNR form. Have copies of a DNR form are readily available so that your wishes can be faithfully followed.   Writing a DNR order  When might a DNR order be written? When the person’s health condition is such that, in the case of cardiac arrest, CPR and other resuscitation methods are not desired. This could be because the chance of successful resuscitation is very low. Or it could be because the care plan now focuses on comfort measures instead of life-sustaining measures. Coma and terminal illness are instances when a DNR order might be used.   Irreversible coma  In a coma, a person does not respond to  sight, sound, or touch. The heart and lungs could be working, but brain function is damaged due to trauma or disease.   Terminal illness  In the last stages of heart disease, AIDS, cancer, and other illnesses, some people don’t want to prolong their suffering. If recovery isn’t likely and quality of life is poor or getting worse, a person or their family may agree to a DNR order.   DNR orders and hospice care  A hospice program can offer care during the final weeks of life. Hospice programs provide dignity, pain control and comfort care in the home or at special facilities. Hospice does not provide aggressive treatment. In fact, a DNR order will likely be discussed before a person is admitted to hospice. A  or  may be able to help you arrange for hospice support.   Documenting end-of-life wishes  In addition to DNR orders, a person with a serious, life-limiting illness may wish to document their treatment wishes. This is called a POST form or by different names, depending on the state. It's meant to provide a portable medical order to help guide healthcare providers on the specific medical treatments a person wants during a medical emergency. It's meant to complement a DNR order, not replace it. Your healthcare provider can tell you more and help you complete the forms. The form may be called one of these:   MOLST (medical orders for life-sustaining treatment)  POLST (physician orders for life-sustaining treatment)  MOST (medical orders for scope of treatment)  POST (physician orders for scope of treatment)  TPOPP (transportable physician orders for patient preferences)  Pasha last reviewed this educational content on 7/1/2021 © 2000-2021 [employer]. All rights reserved. This information is not intended as a substitute for professional medical care. Always follow your healthcare professional's instructions.          An Agent’s Role for Durable Power of  for  Health Care   It’s impossible to know which medical treatment choices you might face in the future. What if you aren't able to make these decisions for yourself? A durable power of  for healthcare lets you name an agent to speak and carry out your wishes on your behalf. This happens only if you can’t express your wishes yourself.     An agent’s duty  Your agent respects your wishes in the following ways:    Your agent’s duty is to see that your wishes are followed.  If your wishes aren’t known, your agent should try to decide what you want.  Your agent’s choices come before anyone else’s wishes for you.  A durable power of  for healthcare doesn't not give your agent control over your money . Your agent also can’t be made to pay your bills.  Find out what your agent can do  Restrictions on what an agent can and can’t do vary by state. Check your state laws. In most states your agent can:   Choose or refuse life-sustaining and other medical treatment on your behalf  Consent to treatment, and then stop treatment if your condition doesn’t improve  Access and release your medical records  Request an autopsy and donate your organs, unless you’ve stated otherwise in your advance directive  Find out whether your state allows your agent to do the following:   Refuse or withdraw life-enhancing care  Refuse or stop tube feeding or other life-sustaining care--even if you haven’t stated on your advance directive that you don’t want these treatments  Order sterilization or   Pasha last reviewed this educational content on 2021 [employer]. All rights reserved. This information is not intended as a substitute for professional medical care. Always follow your healthcare professional's instructions.          Being a Healthcare Proxy  A healthcare proxy is someone who represents a person who can’t speak for themself. The name of this role varies by state. It may be called a  Durable Medical Power of . It may be called a Durable Power of  for Healthcare. It may be called an agent, surrogate, or advocate. Or it may be called a representative or decision maker. It's an official duty that is noted by a legal document. The document also varies by state. The person must name you as his or her proxy on the document.      A healthcare proxy speaks for another person when he or she is not able to do so. The proxy helps make sure the person’s healthcare wishes are known and followed.     What it means to be a healthcare proxy   Your role as healthcare proxy starts when the person can’t make medical decisions. This assessment can only be made by a licensed doctor. You then make the healthcare decisions as needed. You do this by carrying out the person’s wishes. These wishes are noted in his or her advance care planning documents. These declare what kind of treatment the person wishes to have or not have. You may need to put aside your own values and opinions to carry out the person’s wishes. This may include refusing or stopping life-sustaining treatments.   Documenting end-of-life wishes   As a healthcare proxy, encourage the person to discuss his or her wishes, while they are able. They can do this with their healthcare provider and then document the wishes as a medical order. The provider can help the person complete the form. The forms are known by different names depending on the state. The form may be called one of these:     MOLST (medical orders for life-sustaining treatment)  POLST (physician orders for life-sustaining treatment)  MOST (medical orders for scope of treatment)  POST (physician orders for scope of treatment)  TPOPP (transportable physician orders for patient preferences)    The form documents the person’s wishes at the end of life. It's not tied to a certain healthcare provider or facility. It's different than a living will. The form is an order written  according to state regulations by a healthcare provider. To complete one, the person must express his or her wishes to an advanced healthcare provider. If the person can’t make his or her own decisions, then this is done by the person’s healthcare proxy.   Carrying out your role  Your duties depend on what the person’s advance care planning documents say. Your duties may also depend on state law. In general:   Before accepting a role as a proxy, talk with the person. Be sure you know his or her wishes. Ask questions. This will help you be his or her voice if and when it is needed.  Be sure that the person’s healthcare team knows that you are his or her proxy. Carry a copy of the document and proof of your identity.  Make sure the healthcare team has a copy of the advance care planning documents.  Talk to the healthcare team. Ask questions as often as you need. Stay informed about the person’s condition.  Ask for any help you need to understand the medical situation. Ask about the person’s condition and prognosis. Ask about risks and benefits of tests and treatments. Find out all the facts and options.  Speak on the person’s behalf with the healthcare team when needed.  Talk with family members and keep them informed.  Know your rights. You have the right to ask for information. You can ask for consultations and second opinions. You have the right to request or refuse treatment for the person. You may be able to review his or her medical chart. You can authorize the person’s transfer to another facility. You can also request a new healthcare provider for him or her. If you are not sure what your rights are at any time, ask a .  When it’s time to make decisions  If the person’s wishes are clear in the advance care plan documents, ask for them to be carried out as noted. If they are not clear, talk with the healthcare team. Listen to the team’s recommendations. Talk with a  or counselor.  It may be hard for you to make a decision at times. You may feel sad or upset about a decision. Being a healthcare proxy is not an easy role. But it's an important one. Remember that the person trusts you to carry out his or her wishes.   If you need help  Ask the healthcare team if you have trouble with a decision. The healthcare team will help you.  Encourage the person you are helping to have a conversation with their provider about their end-of-life wishes. The provider can help them fill out the form.  You may need help in resolving family conflicts. Ask the hospital or clinic , ethics consultant, or a  for help.  If you are having trouble talking with the healthcare team while the person is in the hospital, reach out to the patient relations department. Or ask to speak to the hospital ombudsman or ethics committee.    Pasha last reviewed this educational content on 9/1/2019 © 2000-2021 [employer]. All rights reserved. This information is not intended as a substitute for professional medical care. Always follow your healthcare professional's instructions.          Life Support  If you understand how specific treatments may affect your quality of life, you can decide which ones you’d choose or refuse. You may want to talk to your healthcare provider about the possible benefits and risks of treatments. The chance of good results from these therapies varies based on each individual clinical situation and can be very hard to predict. Medical treatment, if your life is in danger, falls into 3 main categories.     Life supporting  This care keeps your heart and lungs going when they can no longer work on their own.  CPR restarts your heart and lungs if they stop working.  A respirator (or ventilator) keeps you breathing. Air is pumped into your lungs through a tube that’s put into your windpipe.    Life sustaining  This care keeps you alive longer when you have an  illness that can’t be cured.  Tube feeding or TPN (total parenteral nutrition) provides food and fluids through a tube or IV (intravenous). It is given if you can’t chew or swallow on your own.  Dialysis is a kidney machine that cleans your blood when your kidneys can no longer work on their own.    Life enhancing  This care controls pain and discomfort, such as nausea or trouble breathing. This type of care is not designed to prolong your life, but to enhance comfort and quality of life. Nothing is done to keep you alive longer.  Hospice care is comfort care. It might provide food and fluids by mouth or help with bathing. Hospice care is given during the last stages of a terminal illness.  Strong pain medicine can be given to help keep you comfortable.    Do Not Resuscitate (DNR)  Would you want CPR if your heart stops while you’re a patient in a hospital or nursing home? If not, talk to your healthcare provider about issuing a DNR (Do-Not-Resuscitate) order.   DNRs and advance directives may not apply during anesthesia, in emergency rooms, or when emergency medical teams respond to a 911 call. Ask your healthcare provider how you can make sure your wishes will be followed. Also, a DNR will not prevent you from getting other kinds of needed medical care such as treatment for pain, or bleeding.   Pasha last reviewed this educational content on 8/1/2021 © 2000-2021 [employer]. All rights reserved. This information is not intended as a substitute for professional medical care. Always follow your healthcare professional's instructions.          Stopping Life-Sustaining Treatments  Certain treatments can help sustain life when you have a serious illness. But as your illness progresses, there may come a time when these treatments are no longer a benefit. Decisions must then be made whether to continue or stop these treatments. This can be a hard task for you and your loved ones, but know that you’re  not alone. Your healthcare provider and healthcare team will guide you through these treatment decisions. They will also help answer any questions you may have.     What are life-sustaining treatments?  Life-sustaining treatments help keep you alive if a vital body function fails. These treatments can include CPR and the use of machines to help with heart, lung, or kidney function. They can also include the use of tubes to deliver food, fluids, blood, and medicines to the body. Blood transfusions and antibiotics are also types of life-sustaining treatments.   What happens if life-sustaining treatments are continued?  These treatments can help extend your life. But they will not cure your illness. If you are near the end of your life, you may find it hard to handle the side effects and problems that can occur with these treatments. In this case, the treatments may be too much of a burden on your body. They may cause more harm than good. They may also disrupt the natural dying process and prolong suffering.   What happens if life-sustaining treatments are stopped?  If these treatments are stopped, the focus of treatment will shift to comfort care. This involves measures to control pain and other symptoms you may have. These measures are not meant to cure your illness or help you live longer. Instead, they are meant to improve your quality of life during the time you have left. If you are in the end stage of your illness, you may be referred to hospice by your healthcare provider. Hospice provides end-of-life care. This includes emotional, spiritual, and social support for you and your loved ones.   How do I decide whether to stop life-sustaining treatments?  Your healthcare provider and healthcare team will talk with you about the specific treatments that are part of your care plan. If you want, you may include family and friends in these meetings. Here are some questions to think about or ask your healthcare team:    Is there is a chance that my illness will improve? Or will it continue to get worse?  What are my goals of care? Do I want to extend the time I have left? Or do I want to focus my care on comfort and managing symptoms?  How will stopping or continuing these treatments affect my health? Will they enhance my comfort and quality of life? Or will they cause more problems?  Consider your own values or elizabeth. Also ask for advice from those who share your values.  How do I state my decisions about life-sustaining treatments?  Once you’ve made your decision to continue or stop specific treatments, you can tell your healthcare provider directly. It's best to also put your treatment wishes in writing with advance directives. These are legal forms related to healthcare decisions. Laws about advance directives vary from state to state. Ask your healthcare provider about what forms are needed to make sure your wishes will be followed. Some common forms include:   A durable power of  for healthcare or a healthcare proxy form.  This form lets you name a person to make treatment decisions for you when you can’t. This person is often called a healthcare proxy, medical or healthcare power of , or agent.  A living will.  This form tells others the kinds of treatment you want or don’t want if you become too ill or injured to speak for yourself.  Orders for life-sustaining treatments.  These are actual healthcare provider's orders that must be followed by other medical providers. The form belongs to you, not to the healthcare provider or hospital.). These are legal forms obtained from your healthcare provider or hospital that document your wishes. The forms are known by different names depending on the state. Common names include:  MOLST (medical orders for life-sustaining treatment)  POLST (physician orders for life-sustaining treatment)  MOST (medical orders for scope of treatment)  POST (physician orders for scope  of treatment)  TPOPP (transportable physician orders for patient preferences)     Keep in mind that you can change or cancel an advance directive at any time. Make it a practice to review your decisions each time there is a change in your health or goals of care. Also be sure to tell your healthcare proxy and loved ones of any changes in your decisions.   Deciding whether to stop life-sustaining treatments for a loved one   The decision to stop treatment ideally is made with the person’s consent. If the person is not capable of making decisions and has no advance directive, the decision falls to the person’s healthcare proxy or other adult. If you need to make a decision about stopping treatment for a loved one, start by talking to his or her healthcare provider. Review the goals of care and the benefits and burdens of specific treatments on your loved one’s health. Also think about your loved one’s wishes and values. If needed, seek advice from other healthcare team members, like a  or .   [employer] last reviewed this educational content on 12/1/2019 © 2000-2021 [employer]. All rights reserved. This information is not intended as a substitute for professional medical care. Always follow your healthcare professional's instructions.         [1]   Allergies  Allergen Reactions    Celebrex [Celecoxib] PALPITATIONS    Penicillins ITCHING and SWELLING    Amlodipine OTHER (SEE COMMENTS)     Calves Swelling     Metformin And Related UNKNOWN    Olmesartan UNKNOWN

## 2024-12-12 NOTE — TELEPHONE ENCOUNTER
Per daughter they were in today now for their appointment and they rescheduled her drive to [place] for tomorrow morning.

## 2024-12-13 NOTE — PROGRESS NOTES
Dani Bautista,   Your iron storage Ferritin is stable at 70, you do not need more IV iron at this time.  Continue to follow with Dr. Mohamud on 1/27/25.  Thanks,  Courtney Gibbs Nurse Practitioner

## 2024-12-13 NOTE — PATIENT INSTRUCTIONS
ASSESSMENT/PLAN:     Encounter Diagnoses   Name Primary?    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  No surgical is needed per OB.   Yes    Adult general medical examination       Other iron deficiency anemia hemoglobin stable.       AVM (arteriovenous malformation) of duodenum, acquired no rectal bleeding nor black tarry stool.  Hemoglobin stable.       Other emphysema (HCC) using nebulizer twice a day and slight improvement.  Has also been using [place] 250/51 puff twice a day with rinsing and spitting after each puff.  Has a follow-up with pulmonary.  Will change nebulizer to DuoNeb to see if it will help with congestion.       Stage 3a chronic kidney disease (HCC) No motrin, ibuprofen, advil, alleve, naprosyn  with these medications.  Hydrate at least 48 ounces of water a day.  Check blood.       Colon cancer screening up-to-date with screening.       Constipation, unspecified constipation type can try [employer] All-Bran buds 1 to 2 tablespoons a day.  Put it in cereal or either it straight or can do it with a smoothie or in yogurt.       Dry eye syndrome of both eyes stable.       Dyslipidemia check blood.       Gastric AVM no evidence of acute bleed.       Glaucoma suspect of both eyes no new vision changes.  Follow-up with ophthalmology.       Hypercalcemia check blood.       Hyperkalemia resolved.       Infiltrating ductal carcinoma of right breast, stage 1 (HCC)       Hypomagnesemia will increase magnesium to 1 tablet twice a day try magnesium sulfate or magnesium glycinate.  Since not sure if magnesium is aggravating diarrhea.  Hydrate at least 48 ounces of water a day.  Can use Imodium as needed.  Will check magnesium level in 1 week.  Follow-up with renal.       Hypokalemia resolved.  But hard to take the potassium tablets.  Will try to decrease potassium to 120 mill equivalent tablet a day.  Recheck blood work in 1 week.       Primary hypertension higher.  Increase valsartan.  Call in  2 weeks with some blood pressures.Careful with diet and excercise at least 30 minutes 3-4 times a week. Check blood pressures at different times on different days. Can purchase own blood pressure monitor. If not, check at local pharmacy. Bake foods more and grill occasionally. Avoid fried foods. No salt. Use other seasonings.         Peripheral vascular disease due to secondary diabetes mellitus (HCC)       Primary osteoarthritis involving multiple joints stable.       Neuropathy stable.  Check feet every morning.  Watch for ulcers or sores.  Wear shoes in the house.  Good comfortable shoes.  Watch also for signs and symptoms of infection.       Meibomian gland dysfunction (MGD) of both eyes stable.       Malabsorption of iron (HCC) hemoglobin stable.  No evidence of more bleeding.       Other elevated white blood cell (WBC) count slightly elevated.  Follow-up with hematology.  Not sure if from anemia?       Iron deficiency anemia due to chronic blood loss hemoglobin stable.  No evidence of acute blood loss.       Vitreous floaters of both eyes stable.       Vitamin D deficiency check vitamin D.       Vaccine counseling recommend COVID booster.  Can that at the local pharmacy or through [facility] office.  Holding on Shingrix vaccine for now.       Type 2 diabetes mellitus without retinopathy (HCC) Higher but lows. ? Sensor issues? Call in 1 week with fingersticks. Careful with diet and excercise at least 30 minutes 3-4 times a week. Check sugars at different times on different dates. Careful with low sugars. Carry something with you and check sugar if can. Can carry hanh cracker, etc. Decrease carbohydrates. But also, careful with fruits and natural sugars.One serving a day and no more than 1 handful every day. Check feet  every AM and careful with sores and ulcers on feet bilaterally. Check eyes every year with dilated eye exam.  Check sugars.  2-hour postmeal should be less than 140s.  Pre-meal should be  's.  Both equally affected A1c.  Discussed importance of glycemic control to prevent complications of diabetes  -Discussed complications of diabetes include retinopathy, neuropathy, nephropathy and cardiovascular disease  -Discussed ABCs of DM  -Discussed importance of SBGM  -Discussed importance of low CHO diet, recommend 45gm per meal or 135gm per day  -Normal foot exam  -UTD with optho  -Normotensive        Sarcoidosis stable.       Pseudophakia of both eyes stable.       Presence of IVC filter       Port-A-Cath in place       Cystic thyroid nodule check blood.    CONCLUSION:  1. Multiple cystic nodules are demonstrated throughout the thyroid gland, not significant changed.     2. None of the lesions appear to have characteristics mandated tissue sampling at present, but continued surveillance is recommended.         Diarrhea. Increase magnesium to sulfate. Check blood in 1 week. Decrease potassium to one a day. Check blood in 1 week. Use immodium as needed. But careful not too much. Wash hands thoroughly. BRAT (Bananas, rice apples, tea, bread, etc). [place] diet. Avoid spicey foods. Avoid dairy for 1 week. OK for yogurt. Increase probiotics.  Try [place]'s all bran buds 1-2 tbsp. A day.       Orders Placed This Encounter   Procedures    Comp Metabolic Panel (14)    URINALYSIS, AUTO, W/O SCOPE    Magnesium    Basic Metabolic Panel (8)       Meds This Visit:  Requested Prescriptions      No prescriptions requested or ordered in this encounter       Imaging & Referrals:  None      Return to clinic in 3 months for follow-up.

## 2024-12-14 LAB
M TB IFN-G CD4+ T-CELLS BLD-ACNC: 0 IU/ML
M TB TUBERC IFN-G BLD QL: NEGATIVE
M TB TUBERC IGNF/MITOGEN IGNF CONTROL: 2.91 IU/ML
QFT TB1 AG MINUS NIL: 0.01 IU/ML
QFT TB2 AG MINUS NIL: 0.01 IU/ML

## 2024-12-16 ENCOUNTER — OFFICE VISIT (OUTPATIENT)
Facility: CLINIC | Age: 84
End: 2024-12-16

## 2024-12-16 VITALS
WEIGHT: 146 LBS | HEART RATE: 67 BPM | BODY MASS INDEX: 28 KG/M2 | DIASTOLIC BLOOD PRESSURE: 78 MMHG | SYSTOLIC BLOOD PRESSURE: 148 MMHG

## 2024-12-16 DIAGNOSIS — N18.2 CKD (CHRONIC KIDNEY DISEASE) STAGE 2, GFR 60-89 ML/MIN: ICD-10-CM

## 2024-12-16 DIAGNOSIS — E11.21 TYPE 2 DIABETES MELLITUS WITH NEPHROPATHY (HCC): ICD-10-CM

## 2024-12-16 DIAGNOSIS — D64.9 ANEMIA, UNSPECIFIED TYPE: ICD-10-CM

## 2024-12-16 DIAGNOSIS — M06.9 RHEUMATOID ARTHRITIS, INVOLVING UNSPECIFIED SITE, UNSPECIFIED WHETHER RHEUMATOID FACTOR PRESENT (HCC): ICD-10-CM

## 2024-12-16 DIAGNOSIS — I10 HYPERTENSION, UNSPECIFIED TYPE: ICD-10-CM

## 2024-12-16 DIAGNOSIS — E83.42 HYPOMAGNESEMIA: Primary | ICD-10-CM

## 2024-12-16 LAB — NUCLEAR IGG TITR SER IF: NEGATIVE {TITER}

## 2024-12-16 PROCEDURE — 99214 OFFICE O/P EST MOD 30 MIN: CPT | Performed by: INTERNAL MEDICINE

## 2024-12-16 PROCEDURE — 3078F DIAST BP <80 MM HG: CPT | Performed by: INTERNAL MEDICINE

## 2024-12-16 PROCEDURE — 1126F AMNT PAIN NOTED NONE PRSNT: CPT | Performed by: INTERNAL MEDICINE

## 2024-12-16 PROCEDURE — 1159F MED LIST DOCD IN RCRD: CPT | Performed by: INTERNAL MEDICINE

## 2024-12-16 PROCEDURE — 1160F RVW MEDS BY RX/DR IN RCRD: CPT | Performed by: INTERNAL MEDICINE

## 2024-12-16 PROCEDURE — 3077F SYST BP >= 140 MM HG: CPT | Performed by: INTERNAL MEDICINE

## 2024-12-16 NOTE — PATIENT INSTRUCTIONS
Take magnesium oxide 800 mg three times a day for week 1 and after that go down to twice a day.  Labs in 4 weeks  Labs and fu in 4 months

## 2024-12-16 NOTE — PROGRESS NOTES
[facility]    Progress Note     Chester Bautista    83 y/o F with h/o  COPD,HTN,DM2  breast ca s/p radiation tx ( no chemo) PE  s/p IVC placed  , urinary retention ( benson been removed) iron def anemia sec to bleeding AVMs  (sees Dr Mohamud and DR Garcia) and RA ( Dr Goodrich).    She has  hypomagnesemia since feb 2024. Reviewed labs no mag levels done before. Never had issues before.   No diarrhea, vomiting. No diuretic use. Appetite is fair as well  Noted to be on PPI for a long time    Since lov  Been off ppi  Taking pepcid  Also bs running v high  Also had UTI    Since lov  Was admitted for PNA/copd exacerbation  Also started taking valsartan 80 mg bid  Also had recent UTI  Was taking wrong mag supplement        HISTORY:  Past Medical History:    Anxiety state    Cancer (HCC)    breast cancer 2018    Chronic obstructive pulmonary disease, unspecified (HCC)    Chronic obstructive pulmonary disease, unspecified (HCC)    COPD (chronic obstructive pulmonary disease) (HCC)    Cystic thyroid nodule    Diabetes (HCC)    Diabetes mellitus (HCC)    Essential hypertension    Exposure to medical diagnostic radiation    High blood pressure    High cholesterol    History of blood transfusion    low hemoglobin    Osteoarthritis    PONV (postoperative nausea and vomiting)    Pulmonary embolism (HCC)    Pulmonary emphysema (HCC)    Rheumatoid arthritis (HCC)      Past Surgical History:   Procedure Laterality Date    Cataract extraction w/  intraocular lens implant Bilateral 2017    Dr in [facility]     Colonoscopy      Colonoscopy N/A 07/21/2022    Procedure: COLONOSCOPY;  Surgeon: Mikey Garcia MD;  Location: [facility] ENDOSCOPY    Colonoscopy N/A 06/15/2023    Procedure: COLONOSCOPY;  Surgeon: Mikey Garcia MD;  Location: [facility] ENDOSCOPY    Correct bunion,simple      right foot  1993    Egd  12/21/2023    Dr. Garcia; Duodenal AVM's x4 cauterized    Hysterectomy      1972, no abnormal Paps, due to heavy  bleeding with fibroids.  Not sure if it had bilateral salpingo-oophorectomy.    Ir aneurysm repairm  2010    Computer assisted volumetric craniofomy with clipping of anterior cerebral artery.    Lumpectomy right      Radiation right      Rotator cuff repair      1993    Total knee replacement Right 2010    Total knee replacement Left 07/02/2015    Transoral incisionless fundoplication - internal  01/25/2012 Fredy fundoplication at [facility] Dr. Fournier.    Fredy fundoplication at [facility] Dr. Fournier.    Trigger finger release      left hand  2005    Yag capsulotomy - ou - both eyes Bilateral 09/2021    done in [place]           Medications (Active prior to today's visit):  Current Outpatient Medications   Medication Sig Dispense Refill    ipratropium 0.02 % Inhalation Solution Take 2.5 mL (500 mcg total) by nebulization 2 (two) times daily. 180 each 2    valsartan 80 MG Oral Tab 1  1/2 tabs every 12 hrs. 270 tablet 3    loratadine 10 MG Oral Tab TAKE 1 TABLET BY MOUTH EVERY DAY 30 tablet 1    Potassium Chloride ER 20 MEQ Oral Tab CR Take 40 mEq by mouth every morning. 60 tablet 1    hydroxychloroquine 200 MG Oral Tab Take 1.5 tablets (300 mg total) by mouth daily. 135 tablet 1    predniSONE 5 MG Oral Tab Take 1 tablet (5 mg total) by mouth daily. 90 tablet 1    albuterol (2.5 MG/3ML) 0.083% Inhalation Nebu Soln Take 3 mL (2.5 mg total) by nebulization every 6 (six) hours as needed for Wheezing. 360 mL 3    famotidine 20 MG Oral Tab Take 1 tablet (20 mg total) by mouth 2 (two) times daily. Patient gets over the counter      glipiZIDE 5 MG Oral Tab Take 1 tablet (5 mg total) by mouth every morning before breakfast. 90 tablet 0    Cholecalciferol (VITAMIN D3) 1.25 MG (46760 UT) Oral Cap 1 po [id number]. 2 capsule 4    cyclobenzaprine 10 MG Oral Tab Take 1 tablet (10 mg total) by mouth nightly as needed. 90 tablet 1    insulin glargine (LANTUS SOLOSTAR) 100 UNIT/ML Subcutaneous Solution  Pen-injector Inject 10 Units into the skin daily with dinner. 3 mL 1    montelukast 10 MG Oral Tab Take 1 tablet (10 mg total) by mouth nightly. 90 tablet 3    carvedilol 12.5 MG Oral Tab Take 1 tablet (12.5 mg total) by mouth 2 (two) times daily with meals. 180 tablet 3    fluticasone-salmeterol (WIXELA INHUB) 250-50 MCG/ACT Inhalation Aerosol Powder, Breath Activated Inhale 1 puff into the lungs every 12 (twelve) hours. 1 each 5    gabapentin 100 MG Oral Cap Take 1 capsule (100 mg total) by mouth nightly. 90 capsule 3    rosuvastatin (CRESTOR) 20 MG Oral Tab Take 1 tablet (20 mg total) by mouth nightly. 90 tablet 0    acetaminophen 500 MG Oral Tab Take 1 tablet (500 mg total) by mouth daily.      albuterol 108 (90 Base) MCG/ACT Inhalation Aero Soln TAKE 2 PUFFS BY MOUTH EVERY 4 TO 6 HOURS AS NEEDED 6.7 each 5    BD PEN NEEDLE KADEN 2ND GEN 32G X 4 MM Does not apply Misc 1 EACH DAILY. USE DAILY WITH INSULIN 100 each 1    Blood Glucose Monitoring Suppl (BLOOD GLUCOSE SYSTEM GILLES) Does not apply Kit Dx. E11.9. Checks qam. 1 kit 0    Blood Glucose Monitoring Suppl w/Device Does not apply Kit Dx. E11.9. Checks qam. 1 kit 0    Blood Gluc Meter Disp-Strips Does not apply Device Dx. E11.9. Checks qam. 100 each 0    Accu-Chek FastClix Lancets Does not apply Misc Check sugar once daily as directed (E11.42) 100 each 2    Blood Glucose Monitoring Suppl (ONETOUCH VERIO) w/Device Does not apply Kit 1 each daily. Test 1x daily 1 kit 0    OneTouch Delica Lancets 33G Does not apply Misc 4 x daily 100 each 1     OFF midodrine  Not on Torsemide  Not on kcl  Not on omeprazole    Allergies:  Allergies   Allergen Reactions    Celebrex [Celecoxib] PALPITATIONS    Penicillins ITCHING and SWELLING    Amlodipine OTHER (SEE COMMENTS)     Calves Swelling     Metformin And Related UNKNOWN    Olmesartan UNKNOWN         ROS:     Constitutional:  Negative for decreased activity, fever, irritability and lethargy  ENMT:  Negative for ear drainage,  hearing loss and nasal drainage  Eyes:  Negative for eye discharge and vision loss  Cardiovascular:  Negative for chest pain, sob  Respiratory:  Negative for cough, dyspnea and wheezing  Gastrointestinal:  Negative for abdominal pain, constipation  Genitourinary:  Negative for dysuria and hematuria  Endocrine:  Negative for abnormal sleep patterns  Hema/Lymph:  Negative for easy bleeding and easy bruising  Integumentary:  Negative for pruritus and rash  Musculoskeletal:  Negative for bone/joint symptoms  Neurological:  Negative for gait disturbance  Psychiatric:  Negative for inappropriate interaction and psychiatric symptoms      Vitals:    12/16/24 1537   BP: 148/78   Pulse:        PHYSICAL EXAM:     Constitutional: appears well hydrated alert and responsive no acute distress noted  Head/Face: normocephalic  Eyes/Vision: normal extraocular motion is intact  Nose/Mouth/Throat:mucous membranes are moist   Neck/Thyroid: neck is supple without adenopathy  Lymphatic: no abnormal cervical, supraclavicular adenopathy is noted  Respiratory:  lungs are clear to auscultation bilaterally  Cardiovascular: regular rate and rhythm  Abdomen: soft, non-tender, non-distended, BS normal  Skin/Hair: no unusual rashes present  Back/Spine: no abnormalities noted  Musculoskeletal:  no deformities  Extremities: no edema  Neurological:  Grossly normal    Lab Results   Component Value Date     12/12/2024     11/29/2024     11/01/2024    K 4.3 12/12/2024    K 3.9 11/29/2024    K 4.1 11/01/2024     12/12/2024     11/29/2024     11/01/2024    CO2 26.0 12/12/2024    CO2 25.0 11/29/2024    CO2 24.0 11/01/2024    BUN 25 (H) 12/12/2024    BUN 22 11/29/2024    BUN 16 11/01/2024    CREATSERUM 1.14 (H) 12/12/2024    CREATSERUM 0.94 11/29/2024    CREATSERUM 0.94 11/29/2024    CA 9.6 12/12/2024    CA 9.4 11/29/2024    CA 8.8 11/01/2024    EGFRCR 47 (L) 12/12/2024    EGFRCR 59 (L) 11/29/2024    EGFRCR 59 (L)  11/29/2024    ALB 3.5 11/29/2024    ALB 3.4 10/17/2024    ALB 3.3 10/04/2024    PHOS 3.9 06/07/2024          ASSESSMENT/PLAN:   Assessment   1. CKD (chronic kidney disease) stage 2, GFR 60-89 ml/min  - Protein Electrophoresis w/ TIFFANY & IgA,IgG, IgM Serum; Future  - Bence Mondragon Protein, Random Urine; Future  - Immunoglobulin free LT chains blood; Future  - Protein/Creatinine Ratio, Urine Random; Future    2. Hypomagnesemia  - Basic Metabolic Panel (8); Future  - Magnesium; Future    3. Hypertension, unspecified type    4. Type 2 diabetes mellitus with nephropathy (HCC)    5. Rheumatoid arthritis, involving unspecified site, unspecified whether rheumatoid factor present (HCC)    6. Anemia, unspecified type           DARWIN-->resolves  Likely in setting of hyperglycemia causing polyuria and prerenal DARWIN. Also UTI could be contributing but patient says she took abx   She states she has NOT been taking torsemide  Will keep it that way  Her insulin been adjusted and will work on low carb diet and getting BS in control  Renal fx has improved  Renal us with retention- she knows and she doesn't want to pursue urology. She will empty her bladder    CKD 2/3  Likely sec to HTN/dm/age  Cr hovers bw 0.9-1.2 mg/dl  UA with protein ( likely diabetic nephropathy)   Check UPC  Stop high protein drinks    Hypomagnesemia   Likely sec to omeprazole causing impaired absorption of magnesium by intestinal epithelial cells bc of ppi induced inhibition of TRPM6 and TRPM7 channels.  Taking mag oxide 800 mg po four  times a day   She came off ppi and now on pepcid and tolerating well  On magoxide three times a day--> mag levels ok  Will dec magoxide to two times a day  Recheck labs in 4 weeks  Inc mag containing food in diet    Will inc mag oxide to 800 mg tid for 1 week and then bid and recheck levels    HTN  On valsartan and metoprolol  Check BP at home  Maintain a log    Dm2 with nephropathy  Cannot do jardiance bc of recurrent uti  On  RAB  Check UPC    Anemia  On DANNY    RA  Sees rheum    PLAN  Hydrate and repeat labs  UPC  Electrophoresis  Take magnesium oxide 800 mg three times a day for week 1 and after that go down to twice a day.  Labs in 4 weeks    Labs and fu in 4 months       Orders This Visit:  Orders Placed This Encounter   Procedures    Basic Metabolic Panel (8)    Magnesium    Protein Electrophoresis w/ TIFFANY & IgA,IgG, IgM Serum    Bence Mondragon Protein, Random Urine    Immunoglobulin free LT chains blood    Protein/Creatinine Ratio, Urine Random       Meds This Visit:  Requested Prescriptions      No prescriptions requested or ordered in this encounter       Imaging & Referrals:  None       Hannah Encinas MD  12/16/2024

## 2024-12-17 ENCOUNTER — PATIENT MESSAGE (OUTPATIENT)
Age: 84
End: 2024-12-17

## 2024-12-18 NOTE — TELEPHONE ENCOUNTER
Called and spoke with the patient's daughter, Brea. Let her know I notified Dr. Mohamud and BILL Addison about her My Chart message. Told her they want the patient to get repeat labs and provider visit 2 weeks from her last lab check. Told her I can coordinate these appointments with the patient's octreotide appointment on Monday, 12/30. She stated understanding and appointments scheduled for follow up with BILL Garces at 1:30 pm and labs/injection at 2:00 pm. Told her to give our office a call if her mom's symptoms worsen. She stated understanding and thanked me for the call.

## 2024-12-23 NOTE — TELEPHONE ENCOUNTER
PA approved for in office Cimzia injections. Okay to schedule patient?    Component      Latest Ref Rng 12/12/2024   Quantiferon TB NIL      IU/mL 0.00    Quantiferon-TB1 Minus NIL      IU/mL 0.01    Quantiferon-TB2 Minus NIL      IU/mL 0.01    Quantiferon TB Mitogen minus NIL      IU/mL 2.91    Quantiferon TB Result      Negative  Negative

## 2024-12-27 DIAGNOSIS — K90.9 MALABSORPTION OF IRON (HCC): ICD-10-CM

## 2024-12-27 DIAGNOSIS — D50.0 IRON DEFICIENCY ANEMIA DUE TO CHRONIC BLOOD LOSS: Primary | ICD-10-CM

## 2024-12-27 DIAGNOSIS — Q27.30 AVM (ARTERIOVENOUS MALFORMATION) (HCC): ICD-10-CM

## 2024-12-30 ENCOUNTER — APPOINTMENT (OUTPATIENT)
Age: 84
End: 2024-12-30
Attending: INTERNAL MEDICINE
Payer: MEDICARE

## 2024-12-30 ENCOUNTER — APPOINTMENT (OUTPATIENT)
Age: 84
End: 2024-12-30
Payer: MEDICARE

## 2024-12-30 ENCOUNTER — NURSE ONLY (OUTPATIENT)
Age: 84
End: 2024-12-30
Attending: INTERNAL MEDICINE
Payer: MEDICARE

## 2024-12-30 DIAGNOSIS — Z45.2 ENCOUNTER FOR CARE RELATED TO VASCULAR ACCESS PORT: Primary | ICD-10-CM

## 2024-12-30 DIAGNOSIS — K90.9 MALABSORPTION OF IRON (HCC): ICD-10-CM

## 2024-12-30 DIAGNOSIS — D50.0 IRON DEFICIENCY ANEMIA DUE TO CHRONIC BLOOD LOSS: ICD-10-CM

## 2024-12-30 DIAGNOSIS — Q27.30 AVM (ARTERIOVENOUS MALFORMATION) (HCC): ICD-10-CM

## 2024-12-30 RX ORDER — OCTREOTIDE ACETATE 20 MG
20 KIT INTRAMUSCULAR ONCE
Status: DISCONTINUED | OUTPATIENT
Start: 2024-12-30 | End: 2024-12-31

## 2024-12-30 RX ORDER — SODIUM CHLORIDE 9 MG/ML
10 INJECTION, SOLUTION INTRAMUSCULAR; INTRAVENOUS; SUBCUTANEOUS ONCE
OUTPATIENT
Start: 2025-01-20

## 2024-12-30 RX ORDER — OCTREOTIDE ACETATE 20 MG
20 KIT INTRAMUSCULAR ONCE
Status: CANCELLED | OUTPATIENT
Start: 2025-01-20

## 2024-12-30 NOTE — TELEPHONE ENCOUNTER
Scheduled.     Future Appointments   Date Time Provider Department Center   1/7/2025  2:30 PM Vic Thakkar APRN ECWMOENDO [facility]   1/9/2025 12:00 PM EC [facility] RN RHEUMATOLOGY ECCFHRHEUM [facility]   1/16/2025  1:00 PM Vic Thakkar APRN ECWMOENDO [facility]   1/27/2025  2:00 PM ELM CC LAB2 ELM SW Inf [facility]   1/27/2025  3:00 PM Eric Mohamud MD ELMSW HemOnc [facility]   2/21/2025 12:00 PM Heike Sorto MD ECHNDIM [facility] [place]   3/25/2025  3:00 PM Chapin Parker MD ECWMOPSTEFANI [facility]   4/4/2025  1:00 PM Ashia Goodrich MD ECCFHRHEUM [facility]   4/30/2025  1:40 PM Hannah Encinas MD [id number] [facility]   5/6/2025  3:15 PM Chapin Parker MD ECWMOJEAN [facility]

## 2025-01-03 ENCOUNTER — NURSE ONLY (OUTPATIENT)
Age: 85
End: 2025-01-03
Attending: INTERNAL MEDICINE
Payer: MEDICARE

## 2025-01-03 ENCOUNTER — LAB ENCOUNTER (OUTPATIENT)
Dept: LAB | Facility: HOSPITAL | Age: 85
End: 2025-01-03
Payer: MEDICARE

## 2025-01-03 ENCOUNTER — HOSPITAL ENCOUNTER (OUTPATIENT)
Dept: GENERAL RADIOLOGY | Facility: HOSPITAL | Age: 85
Discharge: HOME | End: 2025-01-03
Payer: MEDICARE

## 2025-01-03 ENCOUNTER — OFFICE VISIT (OUTPATIENT)
Age: 85
End: 2025-01-03
Payer: MEDICARE

## 2025-01-03 VITALS
WEIGHT: 139 LBS | HEIGHT: 61 IN | RESPIRATION RATE: 16 BRPM | DIASTOLIC BLOOD PRESSURE: 84 MMHG | SYSTOLIC BLOOD PRESSURE: 173 MMHG | BODY MASS INDEX: 26.24 KG/M2 | TEMPERATURE: 99 F | OXYGEN SATURATION: 96 % | HEART RATE: 105 BPM

## 2025-01-03 DIAGNOSIS — I10 PRIMARY HYPERTENSION: ICD-10-CM

## 2025-01-03 DIAGNOSIS — J44.9 CHRONIC OBSTRUCTIVE PULMONARY DISEASE, UNSPECIFIED COPD TYPE (HCC): ICD-10-CM

## 2025-01-03 DIAGNOSIS — D50.0 IRON DEFICIENCY ANEMIA DUE TO CHRONIC BLOOD LOSS: ICD-10-CM

## 2025-01-03 DIAGNOSIS — N18.2 CKD (CHRONIC KIDNEY DISEASE) STAGE 2, GFR 60-89 ML/MIN: ICD-10-CM

## 2025-01-03 DIAGNOSIS — K90.9 MALABSORPTION OF IRON (HCC): ICD-10-CM

## 2025-01-03 DIAGNOSIS — R82.90 FOUL SMELLING URINE: ICD-10-CM

## 2025-01-03 DIAGNOSIS — R53.83 OTHER FATIGUE: ICD-10-CM

## 2025-01-03 DIAGNOSIS — E87.6 HYPOKALEMIA: ICD-10-CM

## 2025-01-03 DIAGNOSIS — E78.5 DYSLIPIDEMIA: ICD-10-CM

## 2025-01-03 DIAGNOSIS — R53.82 CHRONIC FATIGUE: ICD-10-CM

## 2025-01-03 DIAGNOSIS — Q27.30 AVM (ARTERIOVENOUS MALFORMATION) (HCC): ICD-10-CM

## 2025-01-03 DIAGNOSIS — R06.02 SOB (SHORTNESS OF BREATH): ICD-10-CM

## 2025-01-03 DIAGNOSIS — D50.9 IRON DEFICIENCY ANEMIA, UNSPECIFIED IRON DEFICIENCY ANEMIA TYPE: ICD-10-CM

## 2025-01-03 DIAGNOSIS — Z45.2 ENCOUNTER FOR CARE RELATED TO VASCULAR ACCESS PORT: Primary | ICD-10-CM

## 2025-01-03 DIAGNOSIS — R53.83 OTHER FATIGUE: Primary | ICD-10-CM

## 2025-01-03 DIAGNOSIS — E83.42 HYPOMAGNESEMIA: ICD-10-CM

## 2025-01-03 DIAGNOSIS — R30.0 DYSURIA: ICD-10-CM

## 2025-01-03 DIAGNOSIS — Z86.711 HISTORY OF PULMONARY EMBOLISM: ICD-10-CM

## 2025-01-03 LAB
ANION GAP SERPL CALC-SCNC: 6 MMOL/L (ref 0–18)
BASOPHILS # BLD AUTO: 0.06 X10(3) UL (ref 0–0.2)
BASOPHILS NFR BLD AUTO: 0.6 %
BUN BLD-MCNC: 15 MG/DL (ref 9–23)
BUN/CREAT SERPL: 16.5 (ref 10–20)
CALCIUM BLD-MCNC: 8.9 MG/DL (ref 8.7–10.4)
CHLORIDE SERPL-SCNC: 106 MMOL/L (ref 98–112)
CHOLEST SERPL-MCNC: 182 MG/DL (ref ?–200)
CO2 SERPL-SCNC: 24 MMOL/L (ref 21–32)
CREAT BLD-MCNC: 0.91 MG/DL
DEPRECATED HBV CORE AB SER IA-ACNC: 79 NG/ML
DEPRECATED RDW RBC AUTO: 51.4 FL (ref 35.1–46.3)
EGFRCR SERPLBLD CKD-EPI 2021: 62 ML/MIN/1.73M2 (ref 60–?)
EOSINOPHIL # BLD AUTO: 0.33 X10(3) UL (ref 0–0.7)
EOSINOPHIL NFR BLD AUTO: 3.2 %
ERYTHROCYTE [DISTWIDTH] IN BLOOD BY AUTOMATED COUNT: 17 % (ref 11–15)
FASTING PATIENT LIPID ANSWER: YES
FASTING STATUS PATIENT QL REPORTED: YES
GLUCOSE BLD-MCNC: 325 MG/DL (ref 70–99)
HCT VFR BLD AUTO: 27.4 %
HDLC SERPL-MCNC: 27 MG/DL (ref 40–59)
HGB BLD-MCNC: 8.4 G/DL
IMM GRANULOCYTES # BLD AUTO: 0.18 X10(3) UL (ref 0–1)
IMM GRANULOCYTES NFR BLD: 1.8 %
IRON SATN MFR SERPL: 8 %
IRON SERPL-MCNC: 21 UG/DL
LDLC SERPL CALC-MCNC: 110 MG/DL (ref ?–100)
LYMPHOCYTES # BLD AUTO: 1.4 X10(3) UL (ref 1–4)
LYMPHOCYTES NFR BLD AUTO: 13.6 %
MAGNESIUM SERPL-MCNC: 1.3 MG/DL (ref 1.6–2.6)
MCH RBC QN AUTO: 25.4 PG (ref 26–34)
MCHC RBC AUTO-ENTMCNC: 30.7 G/DL (ref 31–37)
MCV RBC AUTO: 82.8 FL
MONOCYTES # BLD AUTO: 0.78 X10(3) UL (ref 0.1–1)
MONOCYTES NFR BLD AUTO: 7.6 %
NEUTROPHILS # BLD AUTO: 7.51 X10 (3) UL (ref 1.5–7.7)
NEUTROPHILS # BLD AUTO: 7.51 X10(3) UL (ref 1.5–7.7)
NEUTROPHILS NFR BLD AUTO: 73.2 %
NONHDLC SERPL-MCNC: 155 MG/DL (ref ?–130)
OSMOLALITY SERPL CALC.SUM OF ELEC: 295 MOSM/KG (ref 275–295)
PLATELET # BLD AUTO: 291 10(3)UL (ref 150–450)
POTASSIUM SERPL-SCNC: 4.1 MMOL/L (ref 3.5–5.1)
RBC # BLD AUTO: 3.31 X10(6)UL
SODIUM SERPL-SCNC: 136 MMOL/L (ref 136–145)
TIBC SERPL-MCNC: 280 UG/DL (ref 250–425)
TRANSFERRIN SERPL-MCNC: 188 MG/DL (ref 250–380)
TRIGL SERPL-MCNC: 260 MG/DL (ref 30–149)
VLDLC SERPL CALC-MCNC: 45 MG/DL (ref 0–30)
WBC # BLD AUTO: 10.3 X10(3) UL (ref 4–11)

## 2025-01-03 PROCEDURE — 71046 X-RAY EXAM CHEST 2 VIEWS: CPT

## 2025-01-03 RX ORDER — OCTREOTIDE ACETATE 20 MG
20 KIT INTRAMUSCULAR ONCE
OUTPATIENT
Start: 2025-01-04

## 2025-01-03 RX ORDER — MAGNESIUM OXIDE 400 MG/1
400 TABLET ORAL 3 TIMES DAILY
COMMUNITY

## 2025-01-03 RX ORDER — SODIUM CHLORIDE 9 MG/ML
10 INJECTION, SOLUTION INTRAMUSCULAR; INTRAVENOUS; SUBCUTANEOUS ONCE
OUTPATIENT
Start: 2025-01-04

## 2025-01-03 RX ORDER — OCTREOTIDE ACETATE 20 MG
20 KIT INTRAMUSCULAR ONCE
Status: COMPLETED | OUTPATIENT
Start: 2025-01-03 | End: 2025-01-03

## 2025-01-03 RX ADMIN — OCTREOTIDE ACETATE 20 MG: 20 MG KIT INTRAMUSCULAR at 14:48:00

## 2025-01-04 PROBLEM — R53.82 CHRONIC FATIGUE: Status: ACTIVE | Noted: 2025-01-04

## 2025-01-04 PROBLEM — R05.9 COUGH: Status: ACTIVE | Noted: 2025-01-04

## 2025-01-04 NOTE — PROGRESS NOTES
Cancer Center Progress Note    Patient Name: Chester Bautista   YOB: 1940   Medical Record Number: [id number]   [facility] [id number]  Date: 1/3/25    Chief Complaint:  BUZZ, hx of PE and AVM      Oncology History:  84 year old with chronic iron deficiency anemia in the setting of bleeding AVMs s/p repeat endoscopic treatment, Iv iron treatment and recent RBC transfusion for hgb of 6 on 5/2022. EGD/CLN Dr Garcia 7/2022 noted for 2 AVM in duodenum and 1 AVM in the colon s/p treatment.  There is ongoing concern for more AVMs as well need for chronic monitoring, infusions etc.      History is noted for RA managed by Dr Goodrich, hepatitis B core antibody positivity, and remote history of breast cancer (records not available).     Pt had an infusion 2/2023 injectafer.   5/19/23: reported to ED due to rectal bleeding and abd pain, has CLN planned.    In early 2/24 rec for ER eval in light of the critically HTN. She was treated for pneumonia. Prudencio was then found to have PE and GI bleeding. She was treated with IV heparin and required cauterization twice for GI bleeding, so never started on Apixaban and held from anticoagulation.    Received IV iron in 4/24  Prudencio underwent bidirectional scoping with Dr. Aundrea Garcia in GI in June 2023 were several areas of AVM were cauterized.     Getting monthly octreotide with Dr. Garcia    She last completed IV iron 3 months ago 10/9-10/31/24 (Venofer)    1/3/25  Interim  Patient here with daughter Brea, she presents for f/u of BUZZ, reporting increased fatigue since last visit 11/22/24. She had a follow up and labs scheduled on 12/30/24 that she canceled due home oxygen saturation 83-88% on RA, she reported she would go to ER for evaluation. She did not go to ER, reports her oxygen saturation increased to 88-90% after using her nebulizer. She has a history of  pulm fibrosis with emphesema.    Today oxygen saturation is 96%  on RA. She reports worsened fatigue, dec  appetite, cough with white/tan mucous, increased dyspnea on exertion, no chest pain. WT decreased 7 lbs, 139 lb, previously 146 lb on 12/3/24. She also reports continued foul smelling urine. She denies bleeding, bruising, diarrhea, constipation, vomiting, nightsweats, abd pain, or dizziness. Patient denies blood loss from any orifice that she can see. Continues on octreotide.    HGB lower at 8.4, pending IV iron studies  done today.       Past Medical History: history of breast cancer, BUZZ, bleeding AVM, diabetes, HTN, HLD, RA  Past Surgical History: Hysterectomy 1972, IR aneurysm repair, right lumpectomy, knee replacements  Family History: neg for malignancy  Social History: lives with naif, able to do all ADLs, no smoking or ETOH    Current Outpatient Medications:     magnesium oxide 400 MG Oral Tab, Take 1 tablet (400 mg total) by mouth in the morning, at noon, and at bedtime. Per Dr. JENSEN Mata, Disp: , Rfl:     ipratropium 0.02 % Inhalation Solution, Take 2.5 mL (500 mcg total) by nebulization 2 (two) times daily., Disp: 180 each, Rfl: 2    valsartan 80 MG Oral Tab, 1  1/2 tabs every 12 hrs., Disp: 270 tablet, Rfl: 3    loratadine 10 MG Oral Tab, TAKE 1 TABLET BY MOUTH EVERY DAY, Disp: 30 tablet, Rfl: 1    Potassium Chloride ER 20 MEQ Oral Tab CR, Take 40 mEq by mouth every morning., Disp: 60 tablet, Rfl: 1    hydroxychloroquine 200 MG Oral Tab, Take 1.5 tablets (300 mg total) by mouth daily., Disp: 135 tablet, Rfl: 1    predniSONE 5 MG Oral Tab, Take 1 tablet (5 mg total) by mouth daily., Disp: 90 tablet, Rfl: 1    albuterol (2.5 MG/3ML) 0.083% Inhalation Nebu Soln, Take 3 mL (2.5 mg total) by nebulization every 6 (six) hours as needed for Wheezing., Disp: 360 mL, Rfl: 3    famotidine 20 MG Oral Tab, Take 1 tablet (20 mg total) by mouth 2 (two) times daily. Patient gets over the counter, Disp: , Rfl:     glipiZIDE 5 MG Oral Tab, Take 1 tablet (5 mg total) by mouth every morning before breakfast., Disp: 90  tablet, Rfl: 0    Cholecalciferol (VITAMIN D3) 1.25 MG (27282 UT) Oral Cap, 1 po [id number]., Disp: 2 capsule, Rfl: 4    albuterol 108 (90 Base) MCG/ACT Inhalation Aero Soln, TAKE 2 PUFFS BY MOUTH EVERY 4 TO 6 HOURS AS NEEDED, Disp: 6.7 each, Rfl: 5    cyclobenzaprine 10 MG Oral Tab, Take 1 tablet (10 mg total) by mouth nightly as needed., Disp: 90 tablet, Rfl: 1    BD PEN NEEDLE KADEN 2ND GEN 32G X 4 MM Does not apply Misc, 1 EACH DAILY. USE DAILY WITH INSULIN, Disp: 100 each, Rfl: 1    insulin glargine (LANTUS SOLOSTAR) 100 UNIT/ML Subcutaneous Solution Pen-injector, Inject 10 Units into the skin daily with dinner., Disp: 3 mL, Rfl: 1    montelukast 10 MG Oral Tab, Take 1 tablet (10 mg total) by mouth nightly., Disp: 90 tablet, Rfl: 3    carvedilol 12.5 MG Oral Tab, Take 1 tablet (12.5 mg total) by mouth 2 (two) times daily with meals., Disp: 180 tablet, Rfl: 3    fluticasone-salmeterol (WIXELA INHUB) 250-50 MCG/ACT Inhalation Aerosol Powder, Breath Activated, Inhale 1 puff into the lungs every 12 (twelve) hours., Disp: 1 each, Rfl: 5    Blood Glucose Monitoring Suppl (BLOOD GLUCOSE SYSTEM GILLES) Does not apply Kit, Dx. E11.9. Checks qam., Disp: 1 kit, Rfl: 0    Blood Glucose Monitoring Suppl w/Device Does not apply Kit, Dx. E11.9. Checks qam., Disp: 1 kit, Rfl: 0    Blood Gluc Meter Disp-Strips Does not apply Device, Dx. E11.9. Checks qam., Disp: 100 each, Rfl: 0    gabapentin 100 MG Oral Cap, Take 1 capsule (100 mg total) by mouth nightly., Disp: 90 capsule, Rfl: 3    Accu-Chek FastClix Lancets Does not apply Misc, Check sugar once daily as directed (E11.42), Disp: 100 each, Rfl: 2    rosuvastatin (CRESTOR) 20 MG Oral Tab, Take 1 tablet (20 mg total) by mouth nightly., Disp: 90 tablet, Rfl: 0    Blood Glucose Monitoring Suppl (ONETOUCH VERIO) w/Device Does not apply Kit, 1 each daily. Test 1x daily, Disp: 1 kit, Rfl: 0    OneTouch Delica Lancets 33G Does not apply Misc, 4 x daily, Disp: 100 each, Rfl: 1     acetaminophen 500 MG Oral Tab, Take 1 tablet (500 mg total) by mouth daily., Disp: , Rfl:     Allergies:  Allergies   Allergen Reactions    Celebrex [Celecoxib] PALPITATIONS    Penicillins ITCHING and SWELLING    Amlodipine OTHER (SEE COMMENTS)     Calves Swelling     Metformin And Related UNKNOWN    Olmesartan UNKNOWN        Review of Systems:Oncology specific ROS negative except as per HPI    BP (!) 173/84 (BP Location: Left arm, Patient Position: Sitting, Cuff Size: adult)   Pulse 105   Temp 99.1 °F (37.3 °C) (Tympanic)   Resp 16   Ht 1.549 m (5' 1\")   Wt 63 kg (139 lb)   SpO2 96%   BMI 26.26 kg/m²   Wt Readings from Last 6 Encounters:   01/03/25 63 kg (139 lb)   12/16/24 66.2 kg (146 lb)   12/12/24 65.7 kg (144 lb 12.8 oz)   12/03/24 66.2 kg (146 lb)   11/22/24 65.3 kg (144 lb)   11/18/24 65.3 kg (144 lb)   General: Patient is alert and oriented x 3, not in acute distress.  HEENT: EOMI, MMM  Chest: Course BS to auscultation bilaterally. No WOB.  Abdomen: soft, non tender, non distended  Extremities: no signs of LE edema  Neurological: Strength is grossly intact. Moving all extremities. Gait appropriate.   Psych/Depression: Appropriate mood and affect    Derm: healing right chest port, small ecchymoses below right chest port on breast pink, no edema or hematoma.        Lab Results   Component Value Date    WBC 10.3 01/03/2025    RBC 3.31 (L) 01/03/2025    HGB 8.4 (L) 01/03/2025    HCT 27.4 (L) 01/03/2025    MCV 82.8 01/03/2025    MCH 25.4 (L) 01/03/2025    MCHC 30.7 (L) 01/03/2025    RDW 17.0 (H) 01/03/2025    .0 01/03/2025     Lab Results   Component Value Date     (H) 01/03/2025    BUN 15 01/03/2025    BUNCREA 16.5 01/03/2025    CREATSERUM 0.91 01/03/2025    ANIONGAP 6 01/03/2025    GFRNAA 47 (L) 05/02/2022    GFRAA 54 (L) 05/02/2022    CA 8.9 01/03/2025    OSMOCALC 295 01/03/2025    ALKPHO 103 10/17/2024    AST 31 11/29/2024    ALT 14 11/29/2024    BILT 0.2 10/17/2024    TP 7.2 10/17/2024     ALB 3.5 11/29/2024    GLOBULIN 4.0 (H) 10/04/2024     01/03/2025    K 4.1 01/03/2025     01/03/2025    CO2 24.0 01/03/2025      Latest Reference Range & Units 08/02/24 10:27   FERRITIN 50.0 - 306.0 ng/mL 78.7     Lab Results   Component Value Date    ROSALIE 79 01/03/2025     Lab Results   Component Value Date    IRON 21 (L) 01/03/2025     Lab Results   Component Value Date    IRON 21 (L) 01/03/2025    TRANSFERRIN 188 (L) 01/03/2025    TIBCP 280 01/03/2025    SAT 8 (L) 01/03/2025       Impression and Plan:  84 year old with chronic iron deficiency anemia in the setting of bleeding AVMs s/p repeat endoscopic treatment, Iv iron treatment and rbc tfx 5/2022,    1.) BUZZ from AVM's    - previously CAPUTO has been a hallmark for symptomatic anemia for her, also has worsening fatigue and some lightheadedness  --pt received venofer in 4/24   --pt has improved symptoms with monthly octreotide by Dr. Garcia in GI for continued blood loss;   -11/22/24 Last IV iron 10/9-10/31/24 (Venofer)        She reports worsened fatigue, dec appetite, cough with white/tan mucous, increased dyspnea on exertion, no chest pain. She denies bleeding, bruising, diarrhea, constipation, vomiting, nightsweats, abd pain, or dizziness. Patient denies blood loss from any orifice that she can see. Continues on octreotide.  HGB lower at 8.4, pending IV iron studies  done today.  If ferritin low will consider more IV iron, if ferritin stable to follow up with RANDY Garcia for history of GI bleed      2.) HTN  --remains elevated; improved, cont to f/u with her PCP  1/4/25 173/84, denies headache      3) History of PE  --found on 2/23/24; not on anticoagulation in light of recent GI bleeding   --IVC filter placed on 3/7/24 by IR; she was rec for IVC filter retreival in 3-6 mo by IR, so this was removed  in 6/24  --her PE was noted to be resolved by CT chest on 4/26        4.) COPD/pulm fibrosis  -recent hospitalization and pneumonia 10/22/24, now  resolved  followed by Dr. Parker, now has nebulizer for albuterol    -reports continues dyspnea on exertion and wt loss, will get chest xray. .    5.) UTI  Recent UTI, on ciprofloxacin, denies fever, back pain or dysuria    -continued foul smelling urine, no dysuria or fevers, repeat UA with reflex culture    MDM: Moderate Risk; ongoing likely GI bleeding from AVM's, HTN, acute PE, IVC filter placement    BILL Alexandra    [facility]  [place] [address] [address]

## 2025-01-06 LAB
KAPPA LC FREE SER-MCNC: 9.53 MG/DL (ref 0.33–1.94)
KAPPA LC FREE/LAMBDA FREE SER NEPH: 1.42 {RATIO} (ref 0.26–1.65)
LAMBDA LC FREE SERPL-MCNC: 6.72 MG/DL (ref 0.57–2.63)

## 2025-01-06 NOTE — PROGRESS NOTES
Iron levels are lower than prior.  But iron storage looks good.  BMP Normal (electrolytes, kidney function),   Magnesium level is low again.  Add an extra 400 of magnesium over-the-counter recheck magnesium level in 1 week.  Orders written.  Lipid (choilesterol) is worse.  Goal LDL should be less than 70.  Goal triglycerides should be less than 150.  Goal HDL should be above 50.  HDL who is a cardiovascular sustained aerobic exercise at least 30 minutes 3-4 times a week.  Triglycerides are elevated with higher carbs.  Lower carbs helps lower triglycerides. Careful with diet.  Avoid red meat, shellfish, pork.  Try not to erickson foods.  Lower carb diet.  Exercise is most part at least 30 minutes 3-4 times a week sustained cardiovascular aerobic exercise getting that heart rate going and keeping it going for 30 minutes at a time.  If cannot do 30 will start with 10 minutes of brisk walking is good at each week build up 5 minutes more.  Recheck lipid in 3 months.  Orders written.  If not taking rosuvastatin to begin.  If taking rosuvastatin would increase to 40 mg and recheck in 3 months.  Orders written.

## 2025-01-07 ENCOUNTER — DOCUMENTATION ONLY (OUTPATIENT)
Age: 85
End: 2025-01-07

## 2025-01-07 ENCOUNTER — TELEPHONE (OUTPATIENT)
Age: 85
End: 2025-01-07

## 2025-01-07 DIAGNOSIS — K90.9 MALABSORPTION OF IRON (HCC): ICD-10-CM

## 2025-01-07 DIAGNOSIS — Q27.30 AVM (ARTERIOVENOUS MALFORMATION) (HCC): ICD-10-CM

## 2025-01-07 DIAGNOSIS — D50.0 IRON DEFICIENCY ANEMIA DUE TO CHRONIC BLOOD LOSS: Primary | ICD-10-CM

## 2025-01-07 DIAGNOSIS — R53.83 OTHER FATIGUE: Primary | ICD-10-CM

## 2025-01-07 DIAGNOSIS — D50.9 IRON DEFICIENCY ANEMIA, UNSPECIFIED IRON DEFICIENCY ANEMIA TYPE: ICD-10-CM

## 2025-01-07 NOTE — PROGRESS NOTES
1/7/25 Spoke with patient daughter Brae on the phone. Discussed labs from 1/3/25, Hgb lower 8.4 , Ferritin stable. No IV iron at this time. Chest xray stable with continued copd and interstitial lung disease. Concerned for blood loss, she denies bleeding from any orifice. Continue on monthly octreotide with Dr. Garcia. Continues to have fatigue and inc sob.  To get cbc in 1 week 1/9/25, scheduled same day as rheum appt.  Transfuse if HGB <7 or  <8 and symptomatic  Recommend pt to follow up with GI Dr. Garcia and has follow up Dr. Parker 3/2025  Patient to follow up in 4 weeks with Dr. Mohamud on 1/27/25.   BILL Alexandra

## 2025-01-07 NOTE — TELEPHONE ENCOUNTER
Called and spoke with the patient's daughter, Brea. Confirmed with her about the patient's upcoming follow up appointments with Dr. Mohamud on Monday, 1/27 with lab at 2:00 pm and follow up at 3:00 pm. She stated understanding and told me, Courtney SMART NP wants the patient to get repeat labs next week due to her hemoglobin dropping. She requested a lab appointment on 1/09 since the patient will be here for another doctor's appointment. Lab appointment scheduled for Thursday, 1/09 at 12:45 pm. She stated understanding and thanked me for the call.

## 2025-01-09 ENCOUNTER — NURSE ONLY (OUTPATIENT)
Age: 85
End: 2025-01-09
Attending: INTERNAL MEDICINE
Payer: MEDICARE

## 2025-01-09 ENCOUNTER — TELEPHONE (OUTPATIENT)
Dept: ENDOCRINOLOGY CLINIC | Facility: CLINIC | Age: 85
End: 2025-01-09

## 2025-01-09 ENCOUNTER — NURSE ONLY (OUTPATIENT)
Dept: RHEUMATOLOGY | Facility: CLINIC | Age: 85
End: 2025-01-09

## 2025-01-09 DIAGNOSIS — M05.79 RHEUMATOID ARTHRITIS INVOLVING MULTIPLE SITES WITH POSITIVE RHEUMATOID FACTOR (HCC): Primary | ICD-10-CM

## 2025-01-09 DIAGNOSIS — D50.9 IRON DEFICIENCY ANEMIA, UNSPECIFIED IRON DEFICIENCY ANEMIA TYPE: ICD-10-CM

## 2025-01-09 DIAGNOSIS — R53.83 OTHER FATIGUE: ICD-10-CM

## 2025-01-09 DIAGNOSIS — D50.0 IRON DEFICIENCY ANEMIA DUE TO CHRONIC BLOOD LOSS: ICD-10-CM

## 2025-01-09 DIAGNOSIS — K90.9 MALABSORPTION OF IRON (HCC): ICD-10-CM

## 2025-01-09 DIAGNOSIS — R53.83 OTHER FATIGUE: Primary | ICD-10-CM

## 2025-01-09 DIAGNOSIS — Q27.30 AVM (ARTERIOVENOUS MALFORMATION) (HCC): ICD-10-CM

## 2025-01-09 LAB
ANTIBODY SCREEN: NEGATIVE
BASOPHILS # BLD AUTO: 0.04 X10(3) UL (ref 0–0.2)
BASOPHILS NFR BLD AUTO: 0.3 %
DEPRECATED RDW RBC AUTO: 51.3 FL (ref 35.1–46.3)
EOSINOPHIL # BLD AUTO: 0.07 X10(3) UL (ref 0–0.7)
EOSINOPHIL NFR BLD AUTO: 0.5 %
ERYTHROCYTE [DISTWIDTH] IN BLOOD BY AUTOMATED COUNT: 17.3 % (ref 11–15)
HCT VFR BLD AUTO: 27.1 %
HGB BLD-MCNC: 8.1 G/DL
IMM GRANULOCYTES # BLD AUTO: 0.19 X10(3) UL (ref 0–1)
IMM GRANULOCYTES NFR BLD: 1.4 %
LYMPHOCYTES # BLD AUTO: 1.25 X10(3) UL (ref 1–4)
LYMPHOCYTES NFR BLD AUTO: 9 %
MCH RBC QN AUTO: 24.5 PG (ref 26–34)
MCHC RBC AUTO-ENTMCNC: 29.9 G/DL (ref 31–37)
MCV RBC AUTO: 82.1 FL
MONOCYTES # BLD AUTO: 0.68 X10(3) UL (ref 0.1–1)
MONOCYTES NFR BLD AUTO: 4.9 %
NEUTROPHILS # BLD AUTO: 11.59 X10 (3) UL (ref 1.5–7.7)
NEUTROPHILS # BLD AUTO: 11.59 X10(3) UL (ref 1.5–7.7)
NEUTROPHILS NFR BLD AUTO: 83.9 %
PLATELET # BLD AUTO: 320 10(3)UL (ref 150–450)
RBC # BLD AUTO: 3.3 X10(6)UL
RH BLOOD TYPE: POSITIVE
WBC # BLD AUTO: 13.8 X10(3) UL (ref 4–11)

## 2025-01-09 PROCEDURE — 96372 THER/PROPH/DIAG INJ SC/IM: CPT | Performed by: INTERNAL MEDICINE

## 2025-01-09 NOTE — PROGRESS NOTES
1/9/25 Spoke to pt daughter Brea, discussed CBC with HGB 8.1. Does not meet parameters for rbc transfusion at this time.   She has followup with Dr. Mohamud 1/27/25, to get CBC prior to that appt. She reports may complete the labs when she comes for a different appt on 1/15 or 1/23/25. Patient continues with fatigue no bleeding. Requeted Brea to follow up with GI Dr. Garcia.  Ordered CBC and Type and screen Last iron studies 1/3/25,  consider repeat iron studies in 4 weeks   Questions answered.  BILL Alexandra

## 2025-01-09 NOTE — PROGRESS NOTES
Verified name and . Patient aware receiving Cimzia injection. Educational materials given to patient on medication.  Patient educated on potential side effects. Injections given in bilateral lower abdomen. Patient tolerated well. Patient aware to follow up in 2 weeks for next injection.    Future Appointments   Date Time Provider Department Center   2025 12:45 PM [facility] LAB1 [facility] [place]   2025  1:00 PM Vic Thakkar APRN [facility]END[facility]   2025  2:00 PM [facility] LAB2 [facility] [place]   2025  3:00 PM Eric Mohamud MD [facility] [place]   2025 12:00 PM [facility]   3/25/2025  3:00 PM Chapin Parker MD [facility]ULM  [facility]   4/3/2025  1:20 PM Eric Mohamud MD [facility] Hem[facility]c [place]   2025  1:00 PM Ashia Goodrich MD ECCFHRHEUSHIV [facility]   2025  1:40 PM Hannah Encinas MD [id number]  [facility]   2025  3:15 PM Chapin Parker MD [facility]ULM [facility]

## 2025-01-09 NOTE — TELEPHONE ENCOUNTER
Received fax from [employer] dated on 1/8/25 attached is pt home infusion request form, form placed in provider folder for review and signature.

## 2025-01-10 ENCOUNTER — TELEPHONE (OUTPATIENT)
Dept: INTERNAL MEDICINE CLINIC | Facility: CLINIC | Age: 85
End: 2025-01-10

## 2025-01-10 ENCOUNTER — TELEPHONE (OUTPATIENT)
Age: 85
End: 2025-01-10

## 2025-01-10 ENCOUNTER — TELEPHONE (OUTPATIENT)
Facility: CLINIC | Age: 85
End: 2025-01-10

## 2025-01-10 DIAGNOSIS — Q27.30 AVM (ARTERIOVENOUS MALFORMATION) (HCC): ICD-10-CM

## 2025-01-10 DIAGNOSIS — D64.9 ANEMIA, UNSPECIFIED TYPE: Primary | ICD-10-CM

## 2025-01-10 NOTE — TELEPHONE ENCOUNTER
Brea states patient's Hemoglobin levels have been dropping - currently at 8.1.    Please call.  Thank you.

## 2025-01-10 NOTE — TELEPHONE ENCOUNTER
Spoke with patient's daughter and Dr Sorto's message given . Patient verbalizes understanding and agrees to plan.  Gi referral information provided.

## 2025-01-10 NOTE — TELEPHONE ENCOUNTER
Dr. Garcia    I spoke to Brea Flannery is on octreotide, but in last month hemoglobin level has been dropping.  States last week on 1/3/25 hemoglobin was 8.4 and yesterday 8.1  Per daughter APRN with hematology told her at cut off point for transfusion or iron infusion.    States patient is weak, fatigued, short of breath and legs heavy.  She is \"just laying around the house\"    Patient has COPD.  She has oxygen sensor at home.  When she put it on her mother during call it was 87% at first then was 94% and held there.  Per daughter was told 88% is normal for her.  I told her if oxygen level drops below that or she has shortness of breath at rest she needs to go to ER right away.  Aware if black stools or coffee ground emesis needs to go to er right away.    Her stools have been loose, but not black.  She has had no vomiting.  States never had black stools even when had the AVM issue/blood count dropped in the past.  She has several bowel movements a day (3-4)  due to being on magnesium level due to magnesium level being low.  She is not on oral iron.  She has not had an iron infusion for a while since told \"level stable\" and told was not at the point could given infusion.    Please advise    Thank you

## 2025-01-10 NOTE — TELEPHONE ENCOUNTER
I relayed below message from Dr. Garcia with the daughter and voiced understanding.  Patient has appointment with endocrine next week and she is going to reach out to see if hematology about blood work to see if can get done while she is here.

## 2025-01-10 NOTE — TELEPHONE ENCOUNTER
Call from patient's daughter Brea, on MANUEL, verified gina's name/.  Needs referral to GI because patient has history of AVMs. Hemoglobin has been decreasing.  She just saw Hematology but they told her hemoglobin not low enough yet for blood transfusion or iron infusion.  Daughter thinks patient may need more treatment of AVMs.  Referral sent to Dr Sorto, please advise.    This RN notified Brea if patient's symptoms worsen, take her to ER to evaluate

## 2025-01-10 NOTE — TELEPHONE ENCOUNTER
Discussed with Dr. Mohamud - agree with follow up with hematology for monitoring and will allow them to determine next either IV iron or PRBC transfusion.     Monitor hgb through hematology.

## 2025-01-10 NOTE — TELEPHONE ENCOUNTER
Discussed plans with Hematology team that we will aim to keep Prudencio's iron saturation over 25% as well as ferritin in normal range and over 100 would be great, so we will plan to give her some more IV iron at this time as soon as possible.    Eric Mohamud MD  [facility]  Bridget Chen [facility] [place]  [address]  [phone]

## 2025-01-12 RX ORDER — ROSUVASTATIN CALCIUM 20 MG/1
20 TABLET, COATED ORAL NIGHTLY
Qty: 90 TABLET | Refills: 3 | Status: ON HOLD | OUTPATIENT
Start: 2025-01-12

## 2025-01-12 NOTE — TELEPHONE ENCOUNTER
Refill passed per [facility] protocol.    Requested Prescriptions   Pending Prescriptions Disp Refills    ROSUVASTATIN 20 MG Oral Tab [Pharmacy Med Name: ROSUVASTATIN CALCIUM 20 MG TAB] 90 tablet 0     Sig: TAKE 1 TABLET BY MOUTH EVERY DAY AT NIGHT       Cholesterol Medication Protocol Passed - 1/12/2025  9:58 AM        Passed - ALT < 80     Lab Results   Component Value Date    ALT 14 11/29/2024             Passed - ALT resulted within past year        Passed - Lipid panel within past 12 months     Lab Results   Component Value Date    CHOLEST 182 01/03/2025    TRIG 260 (H) 01/03/2025    HDL 27 (L) 01/03/2025     (H) 01/03/2025    VLDL 45 (H) 01/03/2025    NONHDLC 155 (H) 01/03/2025             Passed - In person appointment or virtual visit in the past 12 mos or appointment in next 3 mos     Recent Outpatient Visits              3 days ago Other fatigue    [facility]    Nurse Only    3 days ago Rheumatoid arthritis involving multiple sites with positive rheumatoid factor (HCC)    [facility]    Nurse Only    1 week ago Other fatigue    [facility] Hematology Oncology [place] Courtney Gibbs APRN    Office Visit    1 week ago Encounter for care related to vascular access port    [facility]    Nurse Only    1 week ago Encounter for care related to vascular access port    [facility]    Nurse Only          Future Appointments         Provider Department Appt Notes    In 4 days Vic Thakkar APRN [facility] Return in about 2 months (around 1/13/2025) for follow up.    In 1 week [facility] RN RHEUMATOLOGY [facility] 1st Cimzia    In 2 weeks [facility] LAB2  [facility]; OCTREOTIDE INJECTION    In 2 weeks Eric Mohamud MD [facility] Hematology Oncology [place] 8 wk f/u.md    In 1 month Heike Sorto MD [facility] [place] ok per  dr mobley    In 2 months Chapin Parker MD [facility] 4 - 6 mos    In 2 months Eric Mohamud MD [facility] Hematology Oncology [place] 2 wk f/u; need IV iron?  pt daughter called stated she's about to take the pt to the hospital due to oxygen level dropping    In 2 months Ashia Goodrich MD [facility] fu, patient is aware they will need a referral.    In 3 months Hannah Encinas MD [facility] 4 month    In 3 months Chapin Parker MD [facility] 6 month                    Passed - Medication is active on med list             Future Appointments         Provider Department Appt Notes    In 4 days Vic Thakkar APRN [facility] Return in about 2 months (around 1/13/2025) for follow up.    In 1 week [facility] RN RHEUMATOLOGY [facility] 1st Cimzia    In 2 weeks [facility] LAB2 Bridget LOPEZ [facility] FT-CBC-Iron studies-PORT; OCTREOTIDE INJECTION    In 2 weeks Eric Mohamud MD [facility] Hematology Oncology [place] 8 wk f/u.md    In 1 month [facility]    In 2 months Chapin Parker MD [facility] 4 - 6 mos    In 2 months Eric Mohamud MD [facility]  [facility] Hematology Oncology [place] 2 wk f/u; need IV iron?  pt daughter called stated she's about to take the pt to the hospital due to oxygen level dropping    In 2 months Ashia Goodrich MD [facility] fu, patient is aware they will need a referral.    In 3 months Hannah Encinas MD [facility] 4 month    In 3 months Chapin Parker MD [facility] 6 month            Recent Outpatient Visits              3 days ago Other fatigue    [facility]    Nurse Only    3 days ago Rheumatoid arthritis involving multiple sites with positive rheumatoid factor (HCC)    [facility]    Nurse Only    1 week ago Other fatigue    [facility] LOPE[facility] Hematology Oncology [place] Courtney Gibbs APRN    Office Visit    1 week ago Encounter for care related to vascular access port    [facility]ridget LOPE[facility]    Nurse Only    1 week ago Encounter for care related to vascular access port    [facility]    Nurse Only

## 2025-01-14 ENCOUNTER — TELEPHONE (OUTPATIENT)
Age: 85
End: 2025-01-14

## 2025-01-16 ENCOUNTER — NURSE ONLY (OUTPATIENT)
Age: 85
End: 2025-01-16
Attending: INTERNAL MEDICINE
Payer: MEDICARE

## 2025-01-16 ENCOUNTER — TELEPHONE (OUTPATIENT)
Facility: CLINIC | Age: 85
End: 2025-01-16

## 2025-01-16 ENCOUNTER — OFFICE VISIT (OUTPATIENT)
Dept: ENDOCRINOLOGY CLINIC | Facility: CLINIC | Age: 85
End: 2025-01-16

## 2025-01-16 VITALS
DIASTOLIC BLOOD PRESSURE: 72 MMHG | SYSTOLIC BLOOD PRESSURE: 153 MMHG | HEIGHT: 61 IN | BODY MASS INDEX: 27 KG/M2 | WEIGHT: 143 LBS | HEART RATE: 96 BPM

## 2025-01-16 DIAGNOSIS — E87.6 HYPOKALEMIA: ICD-10-CM

## 2025-01-16 DIAGNOSIS — E11.65 UNCONTROLLED TYPE 2 DIABETES MELLITUS WITH HYPERGLYCEMIA (HCC): Primary | ICD-10-CM

## 2025-01-16 DIAGNOSIS — D50.0 IRON DEFICIENCY ANEMIA DUE TO CHRONIC BLOOD LOSS: ICD-10-CM

## 2025-01-16 DIAGNOSIS — Z45.2 ENCOUNTER FOR CARE RELATED TO VASCULAR ACCESS PORT: Primary | ICD-10-CM

## 2025-01-16 DIAGNOSIS — R82.90 FOUL SMELLING URINE: ICD-10-CM

## 2025-01-16 DIAGNOSIS — N18.2 CKD (CHRONIC KIDNEY DISEASE) STAGE 2, GFR 60-89 ML/MIN: ICD-10-CM

## 2025-01-16 DIAGNOSIS — R53.83 OTHER FATIGUE: ICD-10-CM

## 2025-01-16 LAB
ANTIBODY SCREEN: NEGATIVE
BASOPHILS # BLD AUTO: 0.04 X10(3) UL (ref 0–0.2)
BASOPHILS NFR BLD AUTO: 0.4 %
BILIRUB UR QL: NEGATIVE
CARTRIDGE EXPIRATION DATE: ABNORMAL DATE
CLARITY UR: CLEAR
DEPRECATED RDW RBC AUTO: 50.5 FL (ref 35.1–46.3)
EOSINOPHIL # BLD AUTO: 0.13 X10(3) UL (ref 0–0.7)
EOSINOPHIL NFR BLD AUTO: 1.3 %
ERYTHROCYTE [DISTWIDTH] IN BLOOD BY AUTOMATED COUNT: 17.2 % (ref 11–15)
GLUCOSE UR-MCNC: 500 MG/DL
HCT VFR BLD AUTO: 27.3 %
HEMOGLOBIN A1C: 9.7 % (ref 4.3–5.6)
HGB BLD-MCNC: 8.1 G/DL
HGB UR QL STRIP.AUTO: NEGATIVE
IMM GRANULOCYTES # BLD AUTO: 0.08 X10(3) UL (ref 0–1)
IMM GRANULOCYTES NFR BLD: 0.8 %
KETONES UR-MCNC: NEGATIVE MG/DL
LEUKOCYTE ESTERASE UR QL STRIP.AUTO: 250
LYMPHOCYTES # BLD AUTO: 1.21 X10(3) UL (ref 1–4)
LYMPHOCYTES NFR BLD AUTO: 12 %
MAGNESIUM SERPL-MCNC: 1.3 MG/DL (ref 1.6–2.6)
MCH RBC QN AUTO: 23.9 PG (ref 26–34)
MCHC RBC AUTO-ENTMCNC: 29.7 G/DL (ref 31–37)
MCV RBC AUTO: 80.5 FL
MONOCYTES # BLD AUTO: 0.44 X10(3) UL (ref 0.1–1)
MONOCYTES NFR BLD AUTO: 4.4 %
NEUTROPHILS # BLD AUTO: 8.16 X10 (3) UL (ref 1.5–7.7)
NEUTROPHILS # BLD AUTO: 8.16 X10(3) UL (ref 1.5–7.7)
NEUTROPHILS NFR BLD AUTO: 81.1 %
PH UR: 5.5 [PH] (ref 5–8)
PLATELET # BLD AUTO: 286 10(3)UL (ref 150–450)
PROT UR-MCNC: 50 MG/DL
PROT UR-MCNC: 60.3 MG/DL (ref ?–14)
RBC # BLD AUTO: 3.39 X10(6)UL
RH BLOOD TYPE: POSITIVE
SP GR UR STRIP: 1.01 (ref 1–1.03)
TEST STRIP EXPIRATION DATE: NORMAL DATE
TEST STRIP LOT #: NORMAL NUMERIC
UROBILINOGEN UR STRIP-ACNC: NORMAL
WBC # BLD AUTO: 10.1 X10(3) UL (ref 4–11)

## 2025-01-16 PROCEDURE — 82947 ASSAY GLUCOSE BLOOD QUANT: CPT | Performed by: NURSE PRACTITIONER

## 2025-01-16 PROCEDURE — 1159F MED LIST DOCD IN RCRD: CPT | Performed by: NURSE PRACTITIONER

## 2025-01-16 PROCEDURE — 83036 HEMOGLOBIN GLYCOSYLATED A1C: CPT | Performed by: NURSE PRACTITIONER

## 2025-01-16 PROCEDURE — 3078F DIAST BP <80 MM HG: CPT | Performed by: NURSE PRACTITIONER

## 2025-01-16 PROCEDURE — 95251 CONT GLUC MNTR ANALYSIS I&R: CPT | Performed by: NURSE PRACTITIONER

## 2025-01-16 PROCEDURE — 1160F RVW MEDS BY RX/DR IN RCRD: CPT | Performed by: NURSE PRACTITIONER

## 2025-01-16 PROCEDURE — 3008F BODY MASS INDEX DOCD: CPT | Performed by: NURSE PRACTITIONER

## 2025-01-16 PROCEDURE — 99214 OFFICE O/P EST MOD 30 MIN: CPT | Performed by: NURSE PRACTITIONER

## 2025-01-16 PROCEDURE — 3077F SYST BP >= 140 MM HG: CPT | Performed by: NURSE PRACTITIONER

## 2025-01-16 RX ORDER — WATER 10 ML/10ML
INJECTION INTRAMUSCULAR; INTRAVENOUS; SUBCUTANEOUS
Status: DISCONTINUED
Start: 2025-01-16 | End: 2025-01-16

## 2025-01-16 RX ORDER — OCTREOTIDE ACETATE 20 MG
20 KIT INTRAMUSCULAR ONCE
OUTPATIENT
Start: 2025-01-17

## 2025-01-16 RX ORDER — SODIUM CHLORIDE 9 MG/ML
10 INJECTION, SOLUTION INTRAMUSCULAR; INTRAVENOUS; SUBCUTANEOUS ONCE
OUTPATIENT
Start: 2025-01-17

## 2025-01-16 NOTE — TELEPHONE ENCOUNTER
RN called Tee at [phone] and spoke to Darby pharmacy tech. She states the pharmacist faxed paperwork to see if GI MD is agreeable to having pt get octreotide given via a home infusion service.
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
## 2025-01-16 NOTE — TELEPHONE ENCOUNTER
[employer] calling to follow up on home infusion sent through fax for prescription Sandostatin. Please call at [phone], thanks.   *Requesting to send as a high priority

## 2025-01-16 NOTE — PROGRESS NOTES
Name: Chester Bautista  YOB: 1940  Report Period: 12/20/2024 - 01/16/2025 (28 days)  Generated: 01/16/2025  Time CGM Active: 81%      Glucose Statistics and Targets  Average Glucose: 278 mg/dL  Glucose Management Indicator (GMI): 10.0%  Glucose Variability (%CV): 27.3%  Target Range: 70 - 180 mg/dL      Time in Ranges  Very High: >250 mg/dL --- 60%  High: 181 - 250 mg/dL --- 29%  Target Range: 70 - 180 mg/dL --- 11%  Low: 54 - 69 mg/dL --- 0%  Very Low: <54 mg/dL --- 0%

## 2025-01-16 NOTE — PATIENT INSTRUCTIONS
A1C: 9.7% today --> increased 7.6% on 11/13/2024  Blood glucose: >450 in clinic today    Medications:   - continue with Glipizide 5mg daily with first meal   - increase Lantus 10 --> 14 units daily with first meal

## 2025-01-17 ENCOUNTER — APPOINTMENT (OUTPATIENT)
Dept: CT IMAGING | Facility: HOSPITAL | Age: 85
End: 2025-01-17
Attending: EMERGENCY MEDICINE
Payer: MEDICARE

## 2025-01-17 ENCOUNTER — APPOINTMENT (OUTPATIENT)
Dept: GENERAL RADIOLOGY | Facility: HOSPITAL | Age: 85
End: 2025-01-17
Attending: EMERGENCY MEDICINE
Payer: MEDICARE

## 2025-01-17 ENCOUNTER — APPOINTMENT (OUTPATIENT)
Dept: MRI IMAGING | Facility: HOSPITAL | Age: 85
End: 2025-01-17
Attending: EMERGENCY MEDICINE
Payer: MEDICARE

## 2025-01-17 ENCOUNTER — HOSPITAL ENCOUNTER (INPATIENT)
Facility: HOSPITAL | Age: 85
LOS: 18 days | Discharge: NURSING, REHAB OR LONG-TERM CARE | End: 2025-02-05
Attending: EMERGENCY MEDICINE | Admitting: INTERNAL MEDICINE
Payer: MEDICARE

## 2025-01-17 ENCOUNTER — TELEPHONE (OUTPATIENT)
Dept: INTERNAL MEDICINE CLINIC | Facility: CLINIC | Age: 85
End: 2025-01-17

## 2025-01-17 DIAGNOSIS — R82.90 FOUL SMELLING URINE: Primary | ICD-10-CM

## 2025-01-17 DIAGNOSIS — R20.2 RIGHT HAND PARESTHESIA: ICD-10-CM

## 2025-01-17 DIAGNOSIS — I26.99 BILATERAL PULMONARY EMBOLISM (HCC): ICD-10-CM

## 2025-01-17 DIAGNOSIS — N39.0 URINARY TRACT INFECTION WITHOUT HEMATURIA, SITE UNSPECIFIED: ICD-10-CM

## 2025-01-17 DIAGNOSIS — I74.10 THROMBUS OF AORTA (HCC): ICD-10-CM

## 2025-01-17 DIAGNOSIS — R42 DIZZINESS: ICD-10-CM

## 2025-01-17 DIAGNOSIS — I63.9 CEREBELLAR STROKE (HCC): Primary | ICD-10-CM

## 2025-01-17 DIAGNOSIS — N30.00 ACUTE CYSTITIS WITHOUT HEMATURIA: ICD-10-CM

## 2025-01-17 LAB
ANION GAP SERPL CALC-SCNC: 9 MMOL/L (ref 0–18)
APTT PPP: 27.2 SECONDS (ref 23–36)
BASOPHILS # BLD AUTO: 0.05 X10(3) UL (ref 0–0.2)
BASOPHILS NFR BLD AUTO: 0.4 %
BILIRUB UR QL: NEGATIVE
BUN BLD-MCNC: 16 MG/DL (ref 9–23)
BUN/CREAT SERPL: 17.2 (ref 10–20)
CALCIUM BLD-MCNC: 9.3 MG/DL (ref 8.7–10.4)
CHLORIDE SERPL-SCNC: 103 MMOL/L (ref 98–112)
CLARITY UR: CLEAR
CO2 SERPL-SCNC: 26 MMOL/L (ref 21–32)
CREAT BLD-MCNC: 0.93 MG/DL
DEPRECATED RDW RBC AUTO: 50.4 FL (ref 35.1–46.3)
EGFRCR SERPLBLD CKD-EPI 2021: 60 ML/MIN/1.73M2 (ref 60–?)
EOSINOPHIL # BLD AUTO: 0.19 X10(3) UL (ref 0–0.7)
EOSINOPHIL NFR BLD AUTO: 1.7 %
ERYTHROCYTE [DISTWIDTH] IN BLOOD BY AUTOMATED COUNT: 17.4 % (ref 11–15)
FLUAV + FLUBV RNA SPEC NAA+PROBE: NEGATIVE
FLUAV + FLUBV RNA SPEC NAA+PROBE: NEGATIVE
GLUCOSE BLD-MCNC: 229 MG/DL (ref 70–99)
GLUCOSE BLDC GLUCOMTR-MCNC: 282 MG/DL (ref 70–99)
GLUCOSE UR-MCNC: 100 MG/DL
HCT VFR BLD AUTO: 30.7 %
HGB BLD-MCNC: 9.2 G/DL
HGB UR QL STRIP.AUTO: NEGATIVE
IMM GRANULOCYTES # BLD AUTO: 0.06 X10(3) UL (ref 0–1)
IMM GRANULOCYTES NFR BLD: 0.5 %
INR BLD: 0.94 (ref 0.8–1.2)
KETONES UR-MCNC: NEGATIVE MG/DL
LEUKOCYTE ESTERASE UR QL STRIP.AUTO: 250
LYMPHOCYTES # BLD AUTO: 1.13 X10(3) UL (ref 1–4)
LYMPHOCYTES NFR BLD AUTO: 10.1 %
MCH RBC QN AUTO: 23.8 PG (ref 26–34)
MCHC RBC AUTO-ENTMCNC: 30 G/DL (ref 31–37)
MCV RBC AUTO: 79.3 FL
MONOCYTES # BLD AUTO: 0.38 X10(3) UL (ref 0.1–1)
MONOCYTES NFR BLD AUTO: 3.4 %
NEUTROPHILS # BLD AUTO: 9.35 X10 (3) UL (ref 1.5–7.7)
NEUTROPHILS # BLD AUTO: 9.35 X10(3) UL (ref 1.5–7.7)
NEUTROPHILS NFR BLD AUTO: 83.9 %
NITRITE UR QL STRIP.AUTO: NEGATIVE
OSMOLALITY SERPL CALC.SUM OF ELEC: 294 MOSM/KG (ref 275–295)
PH UR: 6.5 [PH] (ref 5–8)
PLATELET # BLD AUTO: 328 10(3)UL (ref 150–450)
POTASSIUM SERPL-SCNC: 3.7 MMOL/L (ref 3.5–5.1)
PROT UR-MCNC: 70 MG/DL
PROTHROMBIN TIME: 13.1 SECONDS (ref 11.6–14.8)
RBC # BLD AUTO: 3.87 X10(6)UL
RSV RNA SPEC NAA+PROBE: NEGATIVE
SARS-COV-2 RNA RESP QL NAA+PROBE: NOT DETECTED
SODIUM SERPL-SCNC: 138 MMOL/L (ref 136–145)
SP GR UR STRIP: 1.02 (ref 1–1.03)
TROPONIN I SERPL HS-MCNC: 11 NG/L
TSI SER-ACNC: 0.42 UIU/ML (ref 0.55–4.78)
UROBILINOGEN UR STRIP-ACNC: NORMAL
WBC # BLD AUTO: 11.2 X10(3) UL (ref 4–11)

## 2025-01-17 PROCEDURE — 70498 CT ANGIOGRAPHY NECK: CPT | Performed by: EMERGENCY MEDICINE

## 2025-01-17 PROCEDURE — 70496 CT ANGIOGRAPHY HEAD: CPT | Performed by: EMERGENCY MEDICINE

## 2025-01-17 PROCEDURE — 99223 1ST HOSP IP/OBS HIGH 75: CPT | Performed by: STUDENT IN AN ORGANIZED HEALTH CARE EDUCATION/TRAINING PROGRAM

## 2025-01-17 PROCEDURE — 71275 CT ANGIOGRAPHY CHEST: CPT | Performed by: EMERGENCY MEDICINE

## 2025-01-17 PROCEDURE — 74175 CTA ABDOMEN W/CONTRAST: CPT | Performed by: EMERGENCY MEDICINE

## 2025-01-17 PROCEDURE — 70551 MRI BRAIN STEM W/O DYE: CPT | Performed by: EMERGENCY MEDICINE

## 2025-01-17 PROCEDURE — 70450 CT HEAD/BRAIN W/O DYE: CPT | Performed by: EMERGENCY MEDICINE

## 2025-01-17 PROCEDURE — 71045 X-RAY EXAM CHEST 1 VIEW: CPT | Performed by: EMERGENCY MEDICINE

## 2025-01-17 RX ORDER — HEPARIN SODIUM AND DEXTROSE 10000; 5 [USP'U]/100ML; G/100ML
INJECTION INTRAVENOUS CONTINUOUS
Status: DISCONTINUED | OUTPATIENT
Start: 2025-01-18 | End: 2025-01-17

## 2025-01-17 RX ORDER — ASPIRIN 325 MG
325 TABLET ORAL ONCE
Status: DISCONTINUED | OUTPATIENT
Start: 2025-01-17 | End: 2025-01-17

## 2025-01-17 RX ORDER — LABETALOL HYDROCHLORIDE 5 MG/ML
20 INJECTION, SOLUTION INTRAVENOUS ONCE
Status: COMPLETED | OUTPATIENT
Start: 2025-01-17 | End: 2025-01-17

## 2025-01-17 RX ORDER — HEPARIN SODIUM 1000 [USP'U]/ML
80 INJECTION, SOLUTION INTRAVENOUS; SUBCUTANEOUS ONCE
Status: DISCONTINUED | OUTPATIENT
Start: 2025-01-17 | End: 2025-01-17

## 2025-01-17 RX ORDER — HEPARIN SODIUM AND DEXTROSE 10000; 5 [USP'U]/100ML; G/100ML
18 INJECTION INTRAVENOUS ONCE
Status: DISCONTINUED | OUTPATIENT
Start: 2025-01-17 | End: 2025-01-17

## 2025-01-17 RX ORDER — CIPROFLOXACIN 500 MG/1
500 TABLET, FILM COATED ORAL 2 TIMES DAILY
Qty: 14 TABLET | Refills: 0 | Status: ON HOLD | OUTPATIENT
Start: 2025-01-17 | End: 2025-01-24

## 2025-01-17 RX ORDER — MECLIZINE HYDROCHLORIDE 25 MG/1
25 TABLET ORAL ONCE
Status: COMPLETED | OUTPATIENT
Start: 2025-01-17 | End: 2025-01-17

## 2025-01-17 NOTE — PROGRESS NOTES
Hi, SAGE she wasn't when I saw her last 1/3, she said she sometimes took it, I asked her to start taking mag ox everyday as ordered.  Courtnye CORONADO NP

## 2025-01-17 NOTE — PROGRESS NOTES
1/17/25 UA pos leukocytes and pos nitrite and bacteria, UC positive for klebsiella.  Spoke to patient daughter Brea, ordered ciprofloxacin to start today, notified her to observe glucose closely due to possible interaction with glipizide.   Has appt 1/21/25 for IV iron in clinic.    BILL Alexandra

## 2025-01-17 NOTE — TELEPHONE ENCOUNTER
----- Message from Courtney Gibbs sent at 1/17/2025  4:14 PM CST -----  Regarding: UTI  Hi Dr. Sorto,  Thank you for help with rehab questions re this pt. I had ordered a UA and Cx for her. I was messaging because she has a UTI positive for klebsiella. I think I will order her cipro but it does interact with her glipizide. Let me know if you recommend differently.  Thanks,  Courtney CORONADO NP

## 2025-01-18 ENCOUNTER — APPOINTMENT (OUTPATIENT)
Dept: CV DIAGNOSTICS | Facility: HOSPITAL | Age: 85
End: 2025-01-18
Attending: STUDENT IN AN ORGANIZED HEALTH CARE EDUCATION/TRAINING PROGRAM
Payer: MEDICARE

## 2025-01-18 ENCOUNTER — APPOINTMENT (OUTPATIENT)
Dept: ULTRASOUND IMAGING | Facility: HOSPITAL | Age: 85
End: 2025-01-18
Attending: STUDENT IN AN ORGANIZED HEALTH CARE EDUCATION/TRAINING PROGRAM
Payer: MEDICARE

## 2025-01-18 PROBLEM — R20.2 RIGHT HAND PARESTHESIA: Status: ACTIVE | Noted: 2025-01-18

## 2025-01-18 PROBLEM — I26.99 BILATERAL PULMONARY EMBOLISM (HCC): Status: ACTIVE | Noted: 2025-01-18

## 2025-01-18 PROBLEM — I63.9 NEW CEREBELLAR INFARCT (HCC): Status: ACTIVE | Noted: 2025-01-18

## 2025-01-18 PROBLEM — I74.10 AORTIC THROMBUS (HCC): Status: ACTIVE | Noted: 2025-01-18

## 2025-01-18 PROBLEM — R42 DIZZINESS: Status: ACTIVE | Noted: 2025-01-18

## 2025-01-18 PROBLEM — I26.99 PE (PULMONARY THROMBOEMBOLISM) (HCC): Status: ACTIVE | Noted: 2025-01-18

## 2025-01-18 PROBLEM — I74.10 THROMBUS OF AORTA (HCC): Status: ACTIVE | Noted: 2025-01-18

## 2025-01-18 PROBLEM — N30.00 ACUTE CYSTITIS WITHOUT HEMATURIA: Status: ACTIVE | Noted: 2025-01-18

## 2025-01-18 LAB
ANION GAP SERPL CALC-SCNC: 8 MMOL/L (ref 0–18)
APTT PPP: 27 SECONDS (ref 23–36)
ATRIAL RATE: 84 BPM
BASOPHILS # BLD AUTO: 0.05 X10(3) UL (ref 0–0.2)
BASOPHILS NFR BLD AUTO: 0.6 %
BUN BLD-MCNC: 15 MG/DL (ref 9–23)
BUN/CREAT SERPL: 17.2 (ref 10–20)
CALCIUM BLD-MCNC: 9 MG/DL (ref 8.7–10.4)
CHLORIDE SERPL-SCNC: 104 MMOL/L (ref 98–112)
CHOLEST SERPL-MCNC: 171 MG/DL (ref ?–200)
CO2 SERPL-SCNC: 27 MMOL/L (ref 21–32)
CREAT BLD-MCNC: 0.87 MG/DL
DEPRECATED RDW RBC AUTO: 48.8 FL (ref 35.1–46.3)
EGFRCR SERPLBLD CKD-EPI 2021: 65 ML/MIN/1.73M2 (ref 60–?)
EOSINOPHIL # BLD AUTO: 0.21 X10(3) UL (ref 0–0.7)
EOSINOPHIL NFR BLD AUTO: 2.3 %
ERYTHROCYTE [DISTWIDTH] IN BLOOD BY AUTOMATED COUNT: 17.2 % (ref 11–15)
EST. AVERAGE GLUCOSE BLD GHB EST-MCNC: 243 MG/DL (ref 68–126)
GLUCOSE BLD-MCNC: 204 MG/DL (ref 70–99)
GLUCOSE BLDC GLUCOMTR-MCNC: 148 MG/DL (ref 70–99)
GLUCOSE BLDC GLUCOMTR-MCNC: 211 MG/DL (ref 70–99)
GLUCOSE BLDC GLUCOMTR-MCNC: 222 MG/DL (ref 70–99)
GLUCOSE BLDC GLUCOMTR-MCNC: 336 MG/DL (ref 70–99)
GLUCOSE BLDC GLUCOMTR-MCNC: 381 MG/DL (ref 70–99)
GLUCOSE BLDC GLUCOMTR-MCNC: 421 MG/DL (ref 70–99)
HBA1C MFR BLD: 10.1 % (ref ?–5.7)
HCT VFR BLD AUTO: 26.7 %
HDLC SERPL-MCNC: 43 MG/DL (ref 40–59)
HGB BLD-MCNC: 8.2 G/DL
IMM GRANULOCYTES # BLD AUTO: 0.05 X10(3) UL (ref 0–1)
IMM GRANULOCYTES NFR BLD: 0.6 %
LDLC SERPL CALC-MCNC: 85 MG/DL (ref ?–100)
LYMPHOCYTES # BLD AUTO: 1.91 X10(3) UL (ref 1–4)
LYMPHOCYTES NFR BLD AUTO: 21 %
MCH RBC QN AUTO: 24.1 PG (ref 26–34)
MCHC RBC AUTO-ENTMCNC: 30.7 G/DL (ref 31–37)
MCV RBC AUTO: 78.5 FL
MONOCYTES # BLD AUTO: 0.8 X10(3) UL (ref 0.1–1)
MONOCYTES NFR BLD AUTO: 8.8 %
NEUTROPHILS # BLD AUTO: 6.07 X10 (3) UL (ref 1.5–7.7)
NEUTROPHILS # BLD AUTO: 6.07 X10(3) UL (ref 1.5–7.7)
NEUTROPHILS NFR BLD AUTO: 66.7 %
NONHDLC SERPL-MCNC: 128 MG/DL (ref ?–130)
OSMOLALITY SERPL CALC.SUM OF ELEC: 295 MOSM/KG (ref 275–295)
P AXIS: 32 DEGREES
P-R INTERVAL: 144 MS
PLATELET # BLD AUTO: 301 10(3)UL (ref 150–450)
POTASSIUM SERPL-SCNC: 3.9 MMOL/L (ref 3.5–5.1)
Q-T INTERVAL: 380 MS
QRS DURATION: 70 MS
QTC CALCULATION (BEZET): 449 MS
R AXIS: -12 DEGREES
RBC # BLD AUTO: 3.4 X10(6)UL
SODIUM SERPL-SCNC: 139 MMOL/L (ref 136–145)
T AXIS: 31 DEGREES
T3FREE SERPL-MCNC: 2.46 PG/ML (ref 2.4–4.2)
T4 FREE SERPL-MCNC: 1.2 NG/DL (ref 0.8–1.7)
TRIGL SERPL-MCNC: 259 MG/DL (ref 30–149)
VENTRICULAR RATE: 84 BPM
VLDLC SERPL CALC-MCNC: 42 MG/DL (ref 0–30)
WBC # BLD AUTO: 9.1 X10(3) UL (ref 4–11)

## 2025-01-18 PROCEDURE — 99233 SBSQ HOSP IP/OBS HIGH 50: CPT | Performed by: HOSPITALIST

## 2025-01-18 PROCEDURE — 93306 TTE W/DOPPLER COMPLETE: CPT | Performed by: STUDENT IN AN ORGANIZED HEALTH CARE EDUCATION/TRAINING PROGRAM

## 2025-01-18 PROCEDURE — 99223 1ST HOSP IP/OBS HIGH 75: CPT | Performed by: INTERNAL MEDICINE

## 2025-01-18 PROCEDURE — 93970 EXTREMITY STUDY: CPT | Performed by: STUDENT IN AN ORGANIZED HEALTH CARE EDUCATION/TRAINING PROGRAM

## 2025-01-18 PROCEDURE — 99223 1ST HOSP IP/OBS HIGH 75: CPT | Performed by: OTHER

## 2025-01-18 PROCEDURE — 99222 1ST HOSP IP/OBS MODERATE 55: CPT | Performed by: STUDENT IN AN ORGANIZED HEALTH CARE EDUCATION/TRAINING PROGRAM

## 2025-01-18 RX ORDER — FLUTICASONE PROPIONATE AND SALMETEROL 250; 50 UG/1; UG/1
1 POWDER RESPIRATORY (INHALATION) EVERY 12 HOURS SCHEDULED
Status: DISCONTINUED | OUTPATIENT
Start: 2025-01-18 | End: 2025-02-05

## 2025-01-18 RX ORDER — ACETAMINOPHEN 500 MG
1000 TABLET ORAL ONCE
Status: COMPLETED | OUTPATIENT
Start: 2025-01-18 | End: 2025-01-18

## 2025-01-18 RX ORDER — ROSUVASTATIN CALCIUM 20 MG/1
20 TABLET, COATED ORAL NIGHTLY
Status: DISCONTINUED | OUTPATIENT
Start: 2025-01-18 | End: 2025-01-20

## 2025-01-18 RX ORDER — GABAPENTIN 100 MG/1
100 CAPSULE ORAL NIGHTLY
Status: DISCONTINUED | OUTPATIENT
Start: 2025-01-18 | End: 2025-02-05

## 2025-01-18 RX ORDER — ASPIRIN 81 MG/1
81 TABLET, CHEWABLE ORAL ONCE
Status: DISCONTINUED | OUTPATIENT
Start: 2025-01-18 | End: 2025-01-18

## 2025-01-18 RX ORDER — CETIRIZINE HYDROCHLORIDE 10 MG/1
10 TABLET ORAL DAILY
Status: DISCONTINUED | OUTPATIENT
Start: 2025-01-18 | End: 2025-01-21

## 2025-01-18 RX ORDER — HYDROXYCHLOROQUINE SULFATE 200 MG/1
300 TABLET, FILM COATED ORAL DAILY
Status: DISCONTINUED | OUTPATIENT
Start: 2025-01-18 | End: 2025-02-05

## 2025-01-18 RX ORDER — ACETAMINOPHEN 325 MG/1
650 TABLET ORAL EVERY 8 HOURS
COMMUNITY
End: 2025-02-05

## 2025-01-18 RX ORDER — ATORVASTATIN CALCIUM 40 MG/1
40 TABLET, FILM COATED ORAL NIGHTLY
Status: DISCONTINUED | OUTPATIENT
Start: 2025-01-18 | End: 2025-01-18

## 2025-01-18 RX ORDER — HYDRALAZINE HYDROCHLORIDE 20 MG/ML
10 INJECTION INTRAMUSCULAR; INTRAVENOUS EVERY 2 HOUR PRN
Status: DISCONTINUED | OUTPATIENT
Start: 2025-01-18 | End: 2025-02-05

## 2025-01-18 RX ORDER — OXYCODONE HYDROCHLORIDE 5 MG/1
5 TABLET ORAL EVERY 6 HOURS PRN
Status: DISCONTINUED | OUTPATIENT
Start: 2025-01-18 | End: 2025-02-05

## 2025-01-18 RX ORDER — MONTELUKAST SODIUM 10 MG/1
10 TABLET ORAL NIGHTLY
Status: DISCONTINUED | OUTPATIENT
Start: 2025-01-18 | End: 2025-02-05

## 2025-01-18 RX ORDER — INSULIN DEGLUDEC 100 U/ML
10 INJECTION, SOLUTION SUBCUTANEOUS NIGHTLY
Status: DISCONTINUED | OUTPATIENT
Start: 2025-01-18 | End: 2025-02-05

## 2025-01-18 RX ORDER — MECLIZINE HCL 12.5 MG 12.5 MG/1
12.5 TABLET ORAL 3 TIMES DAILY PRN
Status: DISCONTINUED | OUTPATIENT
Start: 2025-01-18 | End: 2025-02-05

## 2025-01-18 RX ORDER — PREDNISONE 5 MG/1
5 TABLET ORAL DAILY
Status: DISCONTINUED | OUTPATIENT
Start: 2025-01-18 | End: 2025-02-05

## 2025-01-18 RX ORDER — LABETALOL HYDROCHLORIDE 5 MG/ML
10 INJECTION, SOLUTION INTRAVENOUS EVERY 10 MIN PRN
Status: COMPLETED | OUTPATIENT
Start: 2025-01-18 | End: 2025-01-18

## 2025-01-18 RX ORDER — CYCLOBENZAPRINE HCL 5 MG
10 TABLET ORAL NIGHTLY PRN
Status: DISCONTINUED | OUTPATIENT
Start: 2025-01-18 | End: 2025-02-05

## 2025-01-18 RX ORDER — ACETAMINOPHEN 650 MG/1
650 SUPPOSITORY RECTAL EVERY 4 HOURS PRN
Status: DISCONTINUED | OUTPATIENT
Start: 2025-01-18 | End: 2025-02-05

## 2025-01-18 RX ORDER — ONDANSETRON 2 MG/ML
4 INJECTION INTRAMUSCULAR; INTRAVENOUS EVERY 6 HOURS PRN
Status: DISCONTINUED | OUTPATIENT
Start: 2025-01-18 | End: 2025-02-05

## 2025-01-18 RX ORDER — NITROFURANTOIN 25; 75 MG/1; MG/1
100 CAPSULE ORAL 2 TIMES DAILY WITH MEALS
Status: DISCONTINUED | OUTPATIENT
Start: 2025-01-18 | End: 2025-01-20

## 2025-01-18 RX ORDER — ACETAMINOPHEN 325 MG/1
650 TABLET ORAL EVERY 4 HOURS PRN
Status: DISCONTINUED | OUTPATIENT
Start: 2025-01-18 | End: 2025-02-05

## 2025-01-18 RX ORDER — SODIUM CHLORIDE 9 MG/ML
INJECTION, SOLUTION INTRAVENOUS CONTINUOUS
Status: ACTIVE | OUTPATIENT
Start: 2025-01-18 | End: 2025-01-20

## 2025-01-18 RX ORDER — ACETAMINOPHEN 325 MG/1
650 TABLET ORAL EVERY 6 HOURS PRN
Status: DISCONTINUED | OUTPATIENT
Start: 2025-01-18 | End: 2025-02-05

## 2025-01-18 RX ORDER — CLOPIDOGREL BISULFATE 75 MG/1
75 TABLET ORAL DAILY
Status: DISCONTINUED | OUTPATIENT
Start: 2025-01-18 | End: 2025-01-21

## 2025-01-18 RX ORDER — HYDROXYCHLOROQUINE SULFATE 200 MG/1
300 TABLET, FILM COATED ORAL DAILY
Status: DISCONTINUED | OUTPATIENT
Start: 2025-01-18 | End: 2025-01-18

## 2025-01-18 RX ORDER — ALBUTEROL SULFATE 0.83 MG/ML
2.5 SOLUTION RESPIRATORY (INHALATION) EVERY 4 HOURS PRN
Status: DISCONTINUED | OUTPATIENT
Start: 2025-01-18 | End: 2025-02-05

## 2025-01-18 RX ORDER — NITROFURANTOIN 25; 75 MG/1; MG/1
100 CAPSULE ORAL ONCE
Status: COMPLETED | OUTPATIENT
Start: 2025-01-18 | End: 2025-01-18

## 2025-01-18 RX ORDER — METOCLOPRAMIDE HYDROCHLORIDE 5 MG/ML
5 INJECTION INTRAMUSCULAR; INTRAVENOUS EVERY 8 HOURS PRN
Status: DISCONTINUED | OUTPATIENT
Start: 2025-01-18 | End: 2025-02-05

## 2025-01-18 RX ORDER — INSULIN DEGLUDEC 100 U/ML
14 INJECTION, SOLUTION SUBCUTANEOUS NIGHTLY
Status: DISCONTINUED | OUTPATIENT
Start: 2025-01-18 | End: 2025-01-18

## 2025-01-18 RX ORDER — CARVEDILOL 12.5 MG/1
12.5 TABLET ORAL 2 TIMES DAILY WITH MEALS
Status: DISCONTINUED | OUTPATIENT
Start: 2025-01-18 | End: 2025-01-28

## 2025-01-18 RX ORDER — LOSARTAN POTASSIUM 50 MG/1
50 TABLET ORAL DAILY
Status: DISCONTINUED | OUTPATIENT
Start: 2025-01-18 | End: 2025-02-04

## 2025-01-18 RX ORDER — FAMOTIDINE 20 MG/1
20 TABLET, FILM COATED ORAL 2 TIMES DAILY
Status: DISCONTINUED | OUTPATIENT
Start: 2025-01-18 | End: 2025-01-21

## 2025-01-18 RX ORDER — POTASSIUM CHLORIDE 14.9 MG/ML
20 INJECTION INTRAVENOUS ONCE
Status: COMPLETED | OUTPATIENT
Start: 2025-01-18 | End: 2025-01-18

## 2025-01-18 NOTE — CONSULTS
[facility]  [address] [place] [address], [address]  [facility] [address]  [phone]            Chester Bautista Patient Status:  Inpatient    1940 MRN [id number]   Location [facility] 3W/SW Attending Richard Guerra MD   Hosp Day # 0 PCP Heike Sorto MD     Date of Admission:  2025  Date of Consult:  2025  Reason for Consultation:   Acute stroke    History of Present Illness:   Patient is a 84 year old female who was admitted to the hospital for Cerebellar stroke (HCC):    I am seeing her for the first time today, have not seen her previously.  Her daughter at bedside help with the history.  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  She continues to get iron infusions intermittently and required to units of packed RBCs transfusion since .  Her duodenal bleeding improved on octreotide injections    She told me she was in her usual state of health yesterday at 3 PM when she suddenly developed a vertigo sensation while she was sitting in her couch for few minutes then it got extremely severe and she felt the whole room was spinning around her.  She later developed some pain in her right upper extremity and numbness and weakness.  Her vertigo gradually resolved but then she ended up vomiting after the event.    Upon arrival to the emergency department an MRI of the brain was obtained that revealed a small cerebellar stroke.  See images below.   Neurology consulted for the same.    She told me her symptoms almost completely resolved today, she continues to experience only minimal tingling in her right hand.  No vertigo.    She had a CT angiogram of the head and neck while she was in the emergency department that did not reveal any hemodynamically significant stenosis in her intracranial vessels or in her internal carotid arteries bilaterally.   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  Has not been on Plavix in the past    Also has been prescribed rosuvastatin 20 mg and dose was recently increased to 40 mg daily            Past Medical History  Past Medical History:    Anxiety state    Cancer (HCC)    breast cancer 2018    Cerebellar stroke (HCC)    Chronic obstructive pulmonary disease, unspecified (HCC)    Chronic obstructive pulmonary disease, unspecified (HCC)    COPD (chronic obstructive pulmonary disease) (HCC)    Cystic thyroid nodule    Diabetes (HCC)    Diabetes mellitus (HCC)    Essential hypertension    Exposure to medical diagnostic radiation    High blood pressure    High cholesterol    History of blood transfusion    low hemoglobin    Osteoarthritis    PONV (postoperative nausea and vomiting)    Pulmonary embolism (HCC)    Pulmonary emphysema (HCC)    Rheumatoid arthritis (HCC)       Past Surgical History  Past Surgical History:   Procedure Laterality Date    Cataract extraction w/  intraocular lens implant Bilateral 2017    Dr in [facility]     Colonoscopy      Colonoscopy N/A 07/21/2022    Procedure: COLONOSCOPY;  Surgeon: Mikey Garcia MD;  Location: [facility] ENDOSCOPY    Colonoscopy N/A 06/15/2023    Procedure: COLONOSCOPY;  Surgeon: Mikey Garcia MD;  Location: [facility] ENDOSCOPY    Correct bunion,simple      right foot  1993    Egd  12/21/2023    Dr. Garcia; Duodenal AVM's x4 cauterized    Hysterectomy      1972, no abnormal Paps, due to heavy bleeding with fibroids.  Not sure if it had bilateral salpingo-oophorectomy.    Ir aneurysm repairm  2010    Computer assisted volumetric craniofomy with clipping of anterior cerebral artery.    Lumpectomy right      Radiation right      Rotator cuff repair      1993    Total knee replacement Right 2010    Total knee replacement Left  07/02/2015    Transoral incisionless fundoplication - internal  01/25/2012 Fredy fundoplication at [facility] Dr. Fournier.    Fredy fundoplication at [facility] Dr. Fournier.    Trigger finger release      left hand  2005    Yag capsulotomy - ou - both eyes Bilateral 09/2021    done in [place]       Family History  Family History   Problem Relation Age of Onset    Heart Surgery Mother         Leaky valve at 39 y/o.     Prostate Cancer Brother     Cancer Brother     Diabetes Maternal Grandmother     Diabetes Paternal Aunt     Breast Cancer Self 78    Breast Cancer Niece         before 50    Macular degeneration Neg     Glaucoma Neg        Social History  Pediatric History   Patient Parents    Not on file     Other Topics Concern    Not on file   Social History Narrative    Just moved in from [place].   passed away and that is why moved from [place] to be with daughter who lives in [place].  Currently lives with daughter.           Current Medications:  Current Facility-Administered Medications   Medication Dose Route Frequency    meclizine (Antivert) tab 12.5 mg  12.5 mg Oral TID PRN    sodium chloride 0.9% infusion   Intravenous Continuous    acetaminophen (Tylenol) tab 650 mg  650 mg Oral Q4H PRN    Or    acetaminophen (Tylenol) rectal suppository 650 mg  650 mg Rectal Q4H PRN    labetalol (Trandate) 5 mg/mL injection 10 mg  10 mg Intravenous Q10 Min PRN    hydrALAzine (Apresoline) 20 mg/mL injection 10 mg  10 mg Intravenous Q2H PRN    ondansetron (Zofran) 4 MG/2ML injection 4 mg  4 mg Intravenous Q6H PRN    metoclopramide (Reglan) 5 mg/mL injection 5 mg  5 mg Intravenous Q8H PRN    acetaminophen (Tylenol) tab 650 mg  650 mg Oral Q6H PRN    ondansetron (Zofran) 4 MG/2ML injection 4 mg  4 mg Intravenous Q6H PRN    albuterol (Ventolin) (2.5 MG/3ML) 0.083% nebulizer solution 2.5 mg  2.5 mg Nebulization Q4H PRN    [Held by provider] carvedilol (Coreg) tab 12.5 mg  12.5 mg  Oral BID with meals    cyclobenzaprine (Flexeril) tab 10 mg  10 mg Oral Nightly PRN    famotidine (Pepcid) tab 20 mg  20 mg Oral BID    fluticasone-salmeterol (Advair Diskus) 250-50 MCG/ACT inhaler 1 puff  1 puff Inhalation 2 times per day    gabapentin (Neurontin) cap 100 mg  100 mg Oral Nightly    cetirizine (ZyrTEC) tab 10 mg  10 mg Oral Daily    montelukast (Singulair) tab 10 mg  10 mg Oral Nightly    predniSONE (Deltasone) tab 5 mg  5 mg Oral Daily    rosuvastatin (Crestor) tab 20 mg  20 mg Oral Nightly    [Held by provider] losartan (Cozaar) tab 50 mg  50 mg Oral Daily    nitrofurantoin monohydrate macro (Macrobid) cap 100 mg  100 mg Oral BID with meals    hydroxychloroquine (Plaquenil) tab 300 mg  300 mg Oral Daily    oxyCODONE immediate release tab 5 mg  5 mg Oral Q6H PRN     Medications Prior to Admission   Medication Sig    acetaminophen 325 MG Oral Tab Take 2 tablets (650 mg total) by mouth every 8 (eight) hours.    rosuvastatin 20 MG Oral Tab Take 1 tablet (20 mg total) by mouth nightly.    magnesium oxide 400 MG Oral Tab Take 1 tablet (400 mg total) by mouth in the morning, at noon, and at bedtime. Per Dr. JENSEN Mata    valsartan 80 MG Oral Tab 1  1/2 tabs every 12 hrs. (Patient taking differently: Take 1 tablet (80 mg total) by mouth 2 (two) times daily. 1  1/2 tabs every 12 hrs.)    loratadine 10 MG Oral Tab TAKE 1 TABLET BY MOUTH EVERY DAY    Potassium Chloride ER 20 MEQ Oral Tab CR Take 40 mEq by mouth every morning.    hydroxychloroquine 200 MG Oral Tab Take 1.5 tablets (300 mg total) by mouth daily.    predniSONE 5 MG Oral Tab Take 1 tablet (5 mg total) by mouth daily.    famotidine 20 MG Oral Tab Take 1 tablet (20 mg total) by mouth 2 (two) times daily. Patient gets over the counter    glipiZIDE 5 MG Oral Tab Take 1 tablet (5 mg total) by mouth every morning before breakfast.    albuterol 108 (90 Base) MCG/ACT Inhalation Aero Soln TAKE 2 PUFFS BY MOUTH EVERY 4 TO 6 HOURS AS NEEDED    cyclobenzaprine  10 MG Oral Tab Take 1 tablet (10 mg total) by mouth nightly as needed.    insulin glargine (LANTUS SOLOSTAR) 100 UNIT/ML Subcutaneous Solution Pen-injector Inject 10 Units into the skin daily with dinner. (Patient taking differently: Inject 14 Units into the skin daily with dinner.)    montelukast 10 MG Oral Tab Take 1 tablet (10 mg total) by mouth nightly.    carvedilol 12.5 MG Oral Tab Take 1 tablet (12.5 mg total) by mouth 2 (two) times daily with meals.    fluticasone-salmeterol (WIXELA INHUB) 250-50 MCG/ACT Inhalation Aerosol Powder, Breath Activated Inhale 1 puff into the lungs every 12 (twelve) hours.    gabapentin 100 MG Oral Cap Take 1 capsule (100 mg total) by mouth nightly.    ciprofloxacin 500 MG Oral Tab Take 1 tablet (500 mg total) by mouth 2 (two) times daily for 7 days.    ipratropium 0.02 % Inhalation Solution Take 2.5 mL (500 mcg total) by nebulization 2 (two) times daily.    albuterol (2.5 MG/3ML) 0.083% Inhalation Nebu Soln Take 3 mL (2.5 mg total) by nebulization every 6 (six) hours as needed for Wheezing.    Cholecalciferol (VITAMIN D3) 1.25 MG (24608 UT) Oral Cap 1 po [id number].    [] doxycycline 100 MG Oral Cap Take 1 capsule (100 mg total) by mouth 2 (two) times daily for 7 days. (Patient not taking: Reported on 10/25/2024)    [] albuterol (2.5 MG/3ML) 0.083% Inhalation Nebu Soln Take 3 mL (2.5 mg total) by nebulization every 4 (four) hours as needed for Wheezing or Shortness of Breath.    BD PEN NEEDLE KADEN 2ND GEN 32G X 4 MM Does not apply Misc 1 EACH DAILY. USE DAILY WITH INSULIN    Blood Glucose Monitoring Suppl (BLOOD GLUCOSE SYSTEM GILLES) Does not apply Kit Dx. E11.9. Checks qam.    Blood Glucose Monitoring Suppl w/Device Does not apply Kit Dx. E11.9. Checks qam.    Blood Gluc Meter Disp-Strips Does not apply Device Dx. E11.9. Checks qam.    Accu-Chek FastClix Lancets Does not apply Misc Check sugar once daily as directed (E11.42)    Blood Glucose Monitoring Suppl (ONETOUCH  VERIO) w/Device Does not apply Kit 1 each daily. Test 1x daily    OneTouch Delica Lancets 33G Does not apply Misc 4 x daily    acetaminophen 500 MG Oral Tab Take 1 tablet (500 mg total) by mouth daily.       Allergies  Allergies[1]    Review of Systems:   As in HPI, the rest of the 14 system review was done and was negative    Physical Exam:     Vitals:    01/18/25 0432 01/18/25 0600 01/18/25 1009 01/18/25 1010   BP: (!) 178/84 (!) 173/85 156/67    Pulse:  79 86    Resp:  20 18    Temp:  97.6 °F (36.4 °C) 97.7 °F (36.5 °C)    TempSrc:  Oral Oral    SpO2:  94% (!) 83% 95%   Weight:       Height:           General: No apparent distress, well nourished, well groomed.  Head- Normocephalic, atraumatic      Neurological:     Mental Status- Alert and oriented x3.  Normal attention span and concentration  Thought process intact      Language intact including: comprehension, naming, repetition, vocabulary    Cranial Nerves:    III, IV, VI- EOM intact, SUMAYA  V. Facial sensation intact  VII. Face symmetric, no facial weakness  XI. Shoulder shrug is intact  XII. Tongue is midline    Motor Exam:  Muscle tone normal  No atrophy or fasciculations  Strength- upper extremities 5/5 proximally and distally                  - lower  extremities 5/5 proximally and distally    Sensory Exam:  Light touch sensation- intact in all 4 extremities    Deep Tendon Reflexes:  2+ and symmetric      Coordination:  Finger to nose intact      Results:     Laboratory Data:  Lab Results   Component Value Date    WBC 9.1 01/18/2025    HGB 8.2 (L) 01/18/2025    HCT 26.7 (L) 01/18/2025    .0 01/18/2025    CREATSERUM 0.87 01/18/2025    BUN 15 01/18/2025     01/18/2025    K 3.9 01/18/2025     01/18/2025    CO2 27.0 01/18/2025     (H) 01/18/2025    CA 9.0 01/18/2025    ALB 3.5 11/29/2024    ALKPHO 103 10/17/2024    TP 7.2 10/17/2024    AST 31 11/29/2024    ALT 14 11/29/2024    PTT 27.0 01/18/2025    INR 0.94 01/17/2025    PTP 13.1  01/17/2025    T4F 1.2 01/17/2025    TSH 0.418 (L) 01/17/2025    DDIMER 1.60 (H) 02/23/2024    ESRML >130 (H) 11/29/2024    CRP 3.00 (H) 11/29/2024    MG 1.3 (L) 01/16/2025    PHOS 3.9 06/07/2024    B12 814 11/01/2024         Imaging:    MRI BRAIN WO ACUTE (3) SEQUENCE (CPT=70551)    Result Date: 1/18/2025  CONCLUSION:   Limited 3-sequence stroke protocol study was performed. Within these parameters:  1. Acute to early subacute lacunar infarctions in the left cerebellar hemisphere. 2. Stable postoperative changes from a remote frontal craniotomy and anterior parafalcine aneurysm clipping. 3. Stable mild chronic microangiopathic ischemic changes.   Results of this examination were discussed with the patient's nurse, Deepak, by the on-call teleradiologist Dr. Smiley Trejo at 22:32 on 01/17/2025.  A preliminary report was issued by the [facility] teleradiology service. There are no major discrepancies.   Dictated by (CST): Abraham Mar MD on 1/18/2025 at 10:11 AM     Finalized by (CST): Abraham Mar MD on 1/18/2025 at 10:16 AM          US VENOUS DOPPLER LEG BILAT - DIAG IMG (CPT=93970)    Result Date: 1/18/2025  CONCLUSION:  No DVT in either lower extremity.   A preliminary report was issued by the [facility] teleradiology service. There are no major discrepancies.  Dictated by (CST): Abraham Mar MD on 1/18/2025 at 9:32 AM     Finalized by (CST): Abraham Mar MD on 1/18/2025 at 9:33 AM          CTA CHEST+CTA ABDOMEN DISSECT SET (CPT=71275/72169)    Result Date: 1/17/2025  CONCLUSION:  1. Acute pulmonary emboli involving the bilateral lower lobe subsegmental pulmonary artery branches.  2. No evidence of aortic dissection.  3. Ascending thoracic aortic ectasia. Multifocal irregular partially ulcerated peripheral mural thrombus is observed throughout the thoracic and abdominal aorta.   4. Dilatation of the main pulmonary artery trunk may relate to underlying pulmonary hypertension.   5.  Moderate to severe centrilobular/paraseptal emphysema with extensive postradiation fibrosis and chronic opacity of the right middle lobe.  6. Multifocal pleuroparenchymal scarring is apparent.  7. Proximal distal colonic diverticulosis without CT evidence of acute complication in the imaged volume.  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  9. Lesser incidental findings as above.    Results of this examination were discussed with the patient's physician, Dr. Cottrell, by Dr. Gruber at 2213 on 01/17/2025.   Dictated by (CST): Sharath Gruber MD on 1/17/2025 at 10:10 PM     Finalized by (CST): Sharath Gruber MD on 1/17/2025 at 10:22 PM          XR CHEST AP PORTABLE  (CPT=71045)    Result Date: 1/17/2025  CONCLUSION:  1. Unchanged chronic fibrotic changes throughout the lungs.  2. Cardiomegaly.    Dictated by (CST): Sharath Gruber MD on 1/17/2025 at 7:40 PM     Finalized by (CST): Sharath Gruber MD on 1/17/2025 at 7:42 PM          CT STROKE CTA BRAIN/CTA NECK (W IV)(CPT=70496/25505)    Result Date: 1/17/2025  CONCLUSION:  1. Negative for hemodynamically significant stenosis or occlusion of the anterior or posterior circulations.   2. The internal carotid arteries demonstrate tortuosity proximally without evidence of hemodynamically stenosis or occlusion.  3. The vertebral arteries demonstrate contiguous patency bilaterally without evidence of flow-limiting stenosis.  4. 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 Thrombus extends into the proximal left subclavian artery origin, contributing to luminal narrowing.  5. Dilatation of the main pulmonary artery trunk may relate to underlying pulmonary hypertension.  6. 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   7. Lesser incidental findings as above.   Dictated by (CST): Sharath Gruber MD on 1/17/2025 at 7:27 PM     Finalized by (CST): Sharath Gruber MD on 1/17/2025 at 7:36 PM          CT STROKE BRAIN (NO  IV)(ASR=11870)    Result Date: 1/17/2025  CONCLUSION:  1. No acute intracranial hemorrhage or evidence of extended large vessel infarction. If there is clinical concern for acute ischemia, follow-up MRI suggested.   2. Postoperative changes of frontal craniotomy with anterior parafalcine aneurysm clipping.  3. Senescent changes of parenchymal volume loss with sequela of chronic microvascular ischemic disease.  4. There is large vessel atherosclerosis of the anterior and posterior intracranial circulations.  5. Lesser incidental findings as above.    This report was called immediately at 1915 hours to Emergency Department Pod 3 and discussed with the patient's ER physician, Dr. Cottrell.    Dictated by (CST): Sharath Gruber MD on 1/17/2025 at 7:11 PM     Finalized by (CST): Sharath Gruber MD on 1/17/2025 at 7:15 PM         EKG 12 Lead    Result Date: 1/18/2025  Normal sinus rhythm Minimal voltage criteria for LVH, may be normal variant ( R in aVL ) Inferior infarct , age undetermined Anterior infarct (cited on or before 22-OCT-2024) Abnormal ECG When compared with ECG of 22-OCT-2024 12:36, Inferior infarct is now Present Nonspecific T wave abnormality, improved in Inferior leads       Impression:     Cerebellar stroke (HCC)  MRI of the brain, attached above revealed an acute stroke in the left cerebellum-Symptoms improving  CT angiogram of the head and neck revealed 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  She has a history of AVMs in her duodenum that have been cauterized, maintained on octreotide injections requiring iron transfusions and rarely blood transfusions  Echocardiogram with ejection fraction of 55%, mild hypokinesis of the mid anterolateral wall.  No significant change from previous echocardiogram    Recommendations:  1-discussed at length with her and her daughter the risk of  antiplatelets, she has an irregular thrombus of the aortic arch  2-I told her I suggest Plavix 75 mg daily, she understands risk of bleeding from duodenal ulcers and AVMs would increase with any antiplatelet or anticoagulation.  But her risk of further strokes is higher now given findings in her aortic arch.  Discussed with hospitalist Dr. Guerra, it may be appropriate to involve GI service in decision to starting antiplatelets  3-continue high-dose rosuvastatin at 40 mg daily  4-ultrasound lower extremities negative for DVT  5-PT/OT/ST  6-A1c pending, lipid panel with LDL of 85  7-follow-up in outpatient neurology clinic in 4 to 6 weeks      Neurology signing off    Thank you for allowing me to participate in the care of your patient.    Afia Zavala MD            [1]   Allergies  Allergen Reactions    Celebrex [Celecoxib] PALPITATIONS    Penicillins ITCHING and SWELLING    Amlodipine OTHER (SEE COMMENTS)     Calves Swelling     Metformin And Related UNKNOWN    Olmesartan UNKNOWN

## 2025-01-18 NOTE — CM/SW NOTE
SW received MDO for [facility]. Anticipated therapy need: Home with Home Healthcare  SW sent tent HH referrals via [facility]. SW placed f2f.     Assigned SW/CM is to follow up with patient and family to see if they are agreeable for HH services.     SW/CM to remain available for support and/or discharge planning.     Clara Mullins MSW, LSW   x [phone]

## 2025-01-18 NOTE — PLAN OF CARE
Problem: HEMATOLOGIC - ADULT  Goal: Free from bleeding injury  Description: (Example usage: patient with low platelets)  INTERVENTIONS:  - Avoid intramuscular injections, enemas and rectal medication administration  - Ensure safe mobilization of patient  - Hold pressure on venipuncture sites to achieve adequate hemostasis  - Assess for signs and symptoms of internal bleeding  - Monitor lab trends  - Patient is to report abnormal signs of bleeding to staff  - Avoid use of toothpicks and dental floss  - Use electric shaver for shaving  - Use soft bristle tooth brush  - Limit straining and forceful nose blowing  Outcome: Progressing     Problem: Patient Centered Care  Goal: Patient preferences are identified and integrated in the patient's plan of care  Description: Interventions:  - What would you like us to know as we care for you? From home with daughter  - Provide timely, complete, and accurate information to patient/family  - Incorporate patient and family knowledge, values, beliefs, and cultural backgrounds into the planning and delivery of care  - Encourage patient/family to participate in care and decision-making at the level they choose  - Honor patient and family perspectives and choices  Outcome: Progressing     Problem: Diabetes/Glucose Control  Goal: Glucose maintained within prescribed range  Description: INTERVENTIONS:  - Monitor Blood Glucose as ordered  - Assess for signs and symptoms of hyperglycemia and hypoglycemia  - Administer ordered medications to maintain glucose within target range  - Assess barriers to adequate nutritional intake and initiate nutrition consult as needed  - Instruct patient on self management of diabetes  Outcome: Progressing     Problem: CARDIOVASCULAR - ADULT  Goal: Maintains optimal cardiac output and hemodynamic stability  Description: INTERVENTIONS:  - Monitor vital signs, rhythm, and trends  - Monitor for bleeding, hypotension and signs of decreased cardiac output  -  Evaluate effectiveness of vasoactive medications to optimize hemodynamic stability  - Monitor arterial and/or venous puncture sites for bleeding and/or hematoma  - Assess quality of pulses, skin color and temperature  - Assess for signs of decreased coronary artery perfusion - ex. Angina  - Evaluate fluid balance, assess for edema, trend weights  Outcome: Progressing  Goal: Absence of cardiac arrhythmias or at baseline  Description: INTERVENTIONS:  - Continuous cardiac monitoring, monitor vital signs, obtain 12 lead EKG if indicated  - Evaluate effectiveness of antiarrhythmic and heart rate control medications as ordered  - Initiate emergency measures for life threatening arrhythmias  - Monitor electrolytes and administer replacement therapy as ordered  Outcome: Progressing     Problem: METABOLIC/FLUID AND ELECTROLYTES - ADULT  Goal: Glucose maintained within prescribed range  Description: INTERVENTIONS:  - Monitor Blood Glucose as ordered  - Assess for signs and symptoms of hyperglycemia and hypoglycemia  - Administer ordered medications to maintain glucose within target range  - Assess barriers to adequate nutritional intake and initiate nutrition consult as needed  - Instruct patient on self management of diabetes  Outcome: Progressing  Goal: Electrolytes maintained within normal limits  Description: INTERVENTIONS:  - Monitor labs and rhythm and assess patient for signs and symptoms of electrolyte imbalances  - Administer electrolyte replacement as ordered  - Monitor response to electrolyte replacements, including rhythm and repeat lab results as appropriate  - Fluid restriction as ordered  - Instruct patient on fluid and nutrition restrictions as appropriate  Outcome: Progressing     Problem: NEUROLOGICAL - ADULT  Goal: Achieves stable or improved neurological status  Description: INTERVENTIONS  - Assess for and report changes in neurological status  - Initiate measures to prevent increased intracranial  pressure  - Maintain blood pressure and fluid volume within ordered parameters to optimize cerebral perfusion and minimize risk of hemorrhage  - Monitor temperature, glucose, and sodium. Initiate appropriate interventions as ordered  Outcome: Progressing     Problem: PAIN - ADULT  Goal: Verbalizes/displays adequate comfort level or patient's stated pain goal  Description: INTERVENTIONS:  - Encourage pt to monitor pain and request assistance  - Assess pain using appropriate pain scale  - Administer analgesics based on type and severity of pain and evaluate response  - Implement non-pharmacological measures as appropriate and evaluate response  - Consider cultural and social influences on pain and pain management  - Manage/alleviate anxiety  - Utilize distraction and/or relaxation techniques  - Monitor for opioid side effects  - Notify MD/LIP if interventions unsuccessful or patient reports new pain  - Anticipate increased pain with activity and pre-medicate as appropriate  Outcome: Progressing     Problem: SAFETY ADULT - FALL  Goal: Free from fall injury  Description: INTERVENTIONS:  - Assess pt frequently for physical needs  - Identify cognitive and physical deficits and behaviors that affect risk of falls.  - [place] fall precautions as indicated by assessment.  - Educate pt/family on patient safety including physical limitations  - Instruct pt to call for assistance with activity based on assessment  - Modify environment to reduce risk of injury  - Provide assistive devices as appropriate  - Consider OT/PT consult to assist with strengthening/mobility  - Encourage toileting schedule  Outcome: Progressing     Problem: DISCHARGE PLANNING  Goal: Discharge to home or other facility with appropriate resources  Description: INTERVENTIONS:  - Identify barriers to discharge w/pt and caregiver  - Include patient/family/discharge partner in discharge planning  - Arrange for needed discharge resources and transportation as  appropriate  - Identify discharge learning needs (meds, wound care, etc)  - Arrange for interpreters to assist at discharge as needed  - Consider post-discharge preferences of patient/family/discharge partner  - Complete POLST form as appropriate  - Assess patient's ability to be responsible for managing their own health  - Refer to Case Management Department for coordinating discharge planning if the patient needs post-hospital services based on physician/LIP order or complex needs related to functional status, cognitive ability or social support system  Outcome: Progressing

## 2025-01-18 NOTE — H&P
History and Physical     Chester Bautista Patient Status:  Emergency    1940 MRN [id number]   Location [facility] EMERGENCY DEPARTMENT Attending Lorena Walton MD   Hosp Day # 0 PCP Heike Sorto MD     Chief Complaint: dizziness    Subjective:    Chester Bautista is a 84 year old female with HTN, HLD, DM, COPD, arthritis, hx of prior DVT PE s/p IVC filter, and Anemia who presented with room spinning since 1400 today.  States was sitting on couch talking on the phone when the dizziness started, with associated emesis X1.  Thereafter she started having some right shoulder pain and right arm tingling.  Daughter at bedside states mom can do basic household tasks however is otherwise sedentary laying on bed.  Last month on  family had a long car ride to [place] to attend a .   In ED initial /83 with WBC 11.2 with left shift, UA with signs of UTI.  Stroke alert called, found to have 2 small cerebellar infarcts on her MRI and large mural thrombus in descending aorta, per radiology it is chronic though patient stated she was not aware of it. Also found to have small subsegmental PEs on CTA Chest. She took 3 baby aspirins prior to arrival. Otherwise she is not on any anticoagulation at home due to prior severe bleeding and GI AVMs. Neurology and vascular surgery consulted. Patient given meclizine for dizziness, and labetalol for HTN.  Admitted for further management.       History/Other:      Past Medical History:  Past Medical History:    Anxiety state    Cancer (HCC)    breast cancer 2018    Cerebellar stroke (HCC)    Chronic obstructive pulmonary disease, unspecified (HCC)    Chronic obstructive pulmonary disease, unspecified (HCC)    COPD (chronic obstructive pulmonary disease) (HCC)    Cystic thyroid nodule    Diabetes (HCC)    Diabetes mellitus (HCC)    Essential hypertension    Exposure to medical diagnostic radiation    High blood pressure    High  cholesterol    History of blood transfusion    low hemoglobin    Osteoarthritis    PONV (postoperative nausea and vomiting)    Pulmonary embolism (HCC)    Pulmonary emphysema (HCC)    Rheumatoid arthritis (HCC)        Past Surgical History:   Past Surgical History:   Procedure Laterality Date    Cataract extraction w/  intraocular lens implant Bilateral 2017    Dr [facility] the [facility]     Colonoscopy      Colonoscopy N/A 07/21/2022    Procedure: COLONOSCOPY;  Surgeon: Mikey Garcia MD;  Location: [facility] ENDOSCOPY    Colonoscopy N/A 06/15/2023    Procedure: COLONOSCOPY;  Surgeon: Mikey Garcia MD;  Location: [facility] ENDOSCOPY    Correct bunion,simple      right foot  1993    Egd  12/21/2023    Dr. Garcia; Duodenal AVM's x4 cauterized    Hysterectomy      1972, no abnormal Paps, due to heavy bleeding with fibroids.  Not sure if it had bilateral salpingo-oophorectomy.    Ir aneurysm repairm  2010    Computer assisted volumetric craniofomy with clipping of anterior cerebral artery.    Lumpectomy right      Radiation right      Rotator cuff repair      1993    Total knee replacement Right 2010    Total knee replacement Left 07/02/2015    Transoral incisionless fundoplication - internal  01/25/2012 Fredy fundoplication at [facility] Dr. Fournier.    Fredy fundoplication at [facility] Dr. Fournier.    Trigger finger release      left hand  2005    Yag capsulotomy - ou - both eyes Bilateral 09/2021    done in [place]       Social History:  reports that she quit smoking about 28 years ago. Her smoking use included cigarettes. She has never been exposed to tobacco smoke. She has never used smokeless tobacco. She reports that she does not drink alcohol and does not use drugs.    Family History:   Family History   Problem Relation Age of Onset    Heart Surgery Mother         Leaky valve at 39 y/o.     Prostate Cancer Brother     Cancer Brother     Diabetes Maternal Grandmother     Diabetes Paternal Aunt      Breast Cancer Self 78    Breast Cancer Niece         before 50    Macular degeneration Neg     Glaucoma Neg        Allergies: Allergies[1]    Medications:  Medications Ordered Prior to Encounter[2]    Review of Systems:   A comprehensive 14 point review of systems was completed.    Pertinent positives and negatives noted in the HPI.    Objective:     /76   Pulse 84   Temp 97.7 °F (36.5 °C) (Oral)   Resp 16   Ht 5' 1\" (1.549 m)   Wt 148 lb (67.1 kg)   SpO2 93%   BMI 27.96 kg/m²   General: AAF, NAD  HEENT: Normocephalic atraumatic. Moist mucous membranes.   Respiratory: Clear to auscultation bilaterally. No wheezes. No rhonchi.  Cardiovascular: RRR  Abdomen: Soft, nontender, nondistended.  Neurologic: No focal neurological deficits.   Musculoskeletal: Moves all extremities.  Extremities: No edema or cyanosis.  Integument: No rashes or lesions.   Psychiatric: Appropriate mood and affect.    Results:      Labs:  Labs Last 24 Hours:   BMP     CBC    Other     Na 138 Cl 103 BUN 16 Glu 229   Hb 9.2   PTT 27.2 Procal -   K 3.7 CO2 26.0 Cr 0.93   WBC 11.2 >< .0  INR 0.94 CRP -   Renal Lytes Endo    Hct 30.7   Trop - D dim -   eGFR - Ca 9.3 POC Gluc  282    LFT   pBNP - Lactic -   eGFR AA - PO4 - A1c -   AST - APk - Prot -  LDL -     Mg - TSH -   ALT - T russell - Alb -          COVID-19 Lab Results    COVID-19  Lab Results   Component Value Date    COVID19 Not Detected 01/17/2025    COVID19 Not Detected 02/02/2024    COVID19 Not Detected 12/07/2023       Pro-Calcitonin  No results for input(s): \"PCT\" in the last 168 hours.    Cardiac  No results for input(s): \"TROP\", \"PBNP\" in the last 168 hours.    Creatinine Kinase  No results for input(s): \"CK\" in the last 168 hours.    Inflammatory Markers  No results for input(s): \"CRP\", \"ROSALIE\", \"LDH\", \"DDIMER\" in the last 168 hours.    Imaging: Imaging data reviewed in Epic.    Assessment & Plan:      #Left Cerebellar Infarcts  - pt with new sudden onset neuro changes  -  2 small cerebellar infarcts seen on admission MRI  -Neurology consulted  -Patient already took 3 baby aspirin's prior to coming to ED   -Neurology consulted  - CTA negative for LVO, but showing small subsegmental Pes  -f/u lipid panel, A1c, TSH, ECHO   -Continue statin  -PT/OT/ST eval    #Dizziness  -Likely in setting of above  -Meclizine prn   -Neurology consulted    #PE   -Patient is hemodynamically stable, EKG NSR and troponin not elevated.  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 Already took 3 baby aspirins prior to admission, will hold off further AC for now in setting of prior Anemia and GI AVMs   -Previously had IVC filter placed in March 2024 for PE at that time (removed June 2024).   -IR consult for IVC eval again   -Needs eval for hypercoagulable state. Risk factors of recent car ride, overall sedentary state, breast cancer. Sees Dr. Cade Chu in clinic for anemia, needs further follow up     #Descending aortic arch thrombus   -Admission CTA chest revealing mural thrombus throughout the thoracic and abdominal aorta, chronic per radiology  -Vascular surgery consulted     #HTN  -Hold home antihypertensives to allow for permissive hypertension poststroke    #UTI  - nitrofurantoin initiated in ED, continue  -Follow-up urine cultures      Chronic Medical Problems  HTN  HLD  DM  COPD/?sarcoidosis   Rheumatoid Arthritis  DVT/PE s/p IVC   CKD3  Anemia - in setting of bleeding AVMs; getting iron infusions   Hx of Breast Cancer         >75 minutes spent on this admission - examining patient, obtaining history, reviewing previous medical records, going over test results/imaging and discussing plan of care. All questions answered.     Lorena Walton MD  1/17/2025    Supplementary Documentation:          **Certification      PHYSICIAN Certification of Need for Inpatient Hospitalization - Initial Certification    Patient will require inpatient services  that will reasonably be expected to span two midnight's based on the clinical documentation in H+P.   Based on patients current state of illness, I anticipate that, after discharge, patient will require TBD.                          [1]   Allergies  Allergen Reactions    Celebrex [Celecoxib] PALPITATIONS    Penicillins ITCHING and SWELLING    Amlodipine OTHER (SEE COMMENTS)     Calves Swelling     Metformin And Related UNKNOWN    Olmesartan UNKNOWN   [2]   Current Facility-Administered Medications on File Prior to Encounter   Medication Dose Route Frequency Provider Last Rate Last Admin    [COMPLETED] alteplase (Activase) injection 2 mg  2 mg Intravenous Once Eric Mohamud MD   2 mg at 01/16/25 1445    Certolizumab Pegol  mg  400 mg Subcutaneous Q14 Days Ashia Goodrich MD   400 mg at 01/09/25 1225    [COMPLETED] octreoTIDe (Sandostatin LAR) IM injection 20 mg  20 mg Intramuscular Once Zoila Mcqueen MD   20 mg at 01/03/25 1448    [COMPLETED] octreoTIDe (Sandostatin LAR) IM injection 20 mg  20 mg Intramuscular Once Zoila Mcqueen MD   20 mg at 11/29/24 1452    [COMPLETED] octreoTIDe (Sandostatin LAR) 30 MG IM injection             [COMPLETED] octreoTIDe (Sandostatin LAR) IM injection 20 mg  20 mg Intramuscular Once Zoila Mcqueen MD   20 mg at 11/01/24 0952    [COMPLETED] alteplase (Activase) injection 2 mg  2 mg Intravenous Once Eric Mohamud MD   2 mg at 11/01/24 0944    [COMPLETED] iron sucrose (Venofer) 20 mg/mL injection 200 mg  200 mg Intravenous Once Eric Mohamud MD   Stopped at 10/31/24 1504    [COMPLETED] iron sucrose (Venofer) 20 mg/mL injection 200 mg  200 mg Intravenous Once Eric Mohamud MD   Stopped at 10/24/24 1457    [COMPLETED] albuterol (Ventolin) (2.5 MG/3ML) 0.083% nebulizer solution 5 mg  5 mg Nebulization Once Tavo Matamoros MD   5 mg at 10/22/24 1358    [COMPLETED] ipratropium (Atrovent) 0.02 % nebulizer solution 1 mg  1 mg Nebulization Once Tavo Matamoros MD   1 mg at  10/22/24 1358    [COMPLETED] methylPREDNISolone sodium succinate (Solu-MEDROL) injection 40 mg  40 mg Intravenous Once Tavo Matamoros MD   40 mg at 10/22/24 1431    [COMPLETED] albuterol (Ventolin) (2.5 MG/3ML) 0.083% nebulizer solution 5 mg  5 mg Nebulization Once Tavo Matamoros MD   5 mg at 10/22/24 1533    [COMPLETED] ipratropium (Atrovent) 0.02 % nebulizer solution 0.5 mg  0.5 mg Nebulization Once Tavo Matamoros MD   0.5 mg at 10/22/24 1533    [COMPLETED] dexamethasone (Decadron) tab 10 mg  10 mg Oral Once Tavo Matamoros MD   10 mg at 10/22/24 1707    [COMPLETED] midazolam (Versed) 2 MG/2ML injection             [COMPLETED] fentaNYL (Sublimaze) 50 mcg/mL injection             [COMPLETED] heparin (Porcine) 100 Units/mL lock flush             [COMPLETED] heparin in sodium chloride 0.9% (Porcine) 2 Units/mL flush bag premix             [COMPLETED] lidocaine (Xylocaine) 2 % injection             [COMPLETED] ceFAZolin (Ancef) 2 g/10mL IV syringe premix             [COMPLETED] hydrALAzine (Apresoline) 20 mg/mL injection 15 mg  15 mg Intravenous Once Kai Flanagan MD   15 mg at 10/21/24 1557    octreoTIDe (Sandostatin LAR) IM injection 20 mg  20 mg Intramuscular Q30 Days Zoila Mcqueen MD         Current Outpatient Medications on File Prior to Encounter   Medication Sig Dispense Refill    ciprofloxacin 500 MG Oral Tab Take 1 tablet (500 mg total) by mouth 2 (two) times daily for 7 days. 14 tablet 0    rosuvastatin 20 MG Oral Tab Take 1 tablet (20 mg total) by mouth nightly. 90 tablet 3    magnesium oxide 400 MG Oral Tab Take 1 tablet (400 mg total) by mouth in the morning, at noon, and at bedtime. Per Dr. JENSEN Mata      ipratropium 0.02 % Inhalation Solution Take 2.5 mL (500 mcg total) by nebulization 2 (two) times daily. 180 each 2    valsartan 80 MG Oral Tab 1  1/2 tabs every 12 hrs. 270 tablet 3    loratadine 10 MG Oral Tab TAKE 1 TABLET BY MOUTH EVERY DAY 30 tablet 1    Potassium Chloride ER 20 MEQ Oral Tab CR  Take 40 mEq by mouth every morning. 60 tablet 1    hydroxychloroquine 200 MG Oral Tab Take 1.5 tablets (300 mg total) by mouth daily. 135 tablet 1    predniSONE 5 MG Oral Tab Take 1 tablet (5 mg total) by mouth daily. 90 tablet 1    albuterol (2.5 MG/3ML) 0.083% Inhalation Nebu Soln Take 3 mL (2.5 mg total) by nebulization every 6 (six) hours as needed for Wheezing. 360 mL 3    famotidine 20 MG Oral Tab Take 1 tablet (20 mg total) by mouth 2 (two) times daily. Patient gets over the counter      glipiZIDE 5 MG Oral Tab Take 1 tablet (5 mg total) by mouth every morning before breakfast. 90 tablet 0    Cholecalciferol (VITAMIN D3) 1.25 MG (80415 UT) Oral Cap 1 po [id number]. 2 capsule 4    albuterol 108 (90 Base) MCG/ACT Inhalation Aero Soln TAKE 2 PUFFS BY MOUTH EVERY 4 TO 6 HOURS AS NEEDED 6.7 each 5    [] doxycycline 100 MG Oral Cap Take 1 capsule (100 mg total) by mouth 2 (two) times daily for 7 days. (Patient not taking: Reported on 10/25/2024) 14 capsule 0    [] albuterol (2.5 MG/3ML) 0.083% Inhalation Nebu Soln Take 3 mL (2.5 mg total) by nebulization every 4 (four) hours as needed for Wheezing or Shortness of Breath. 30 each 0    cyclobenzaprine 10 MG Oral Tab Take 1 tablet (10 mg total) by mouth nightly as needed. 90 tablet 1    BD PEN NEEDLE KADEN 2ND GEN 32G X 4 MM Does not apply Misc 1 EACH DAILY. USE DAILY WITH INSULIN 100 each 1    insulin glargine (LANTUS SOLOSTAR) 100 UNIT/ML Subcutaneous Solution Pen-injector Inject 10 Units into the skin daily with dinner. 3 mL 1    montelukast 10 MG Oral Tab Take 1 tablet (10 mg total) by mouth nightly. 90 tablet 3    carvedilol 12.5 MG Oral Tab Take 1 tablet (12.5 mg total) by mouth 2 (two) times daily with meals. 180 tablet 3    fluticasone-salmeterol (WIXELA INHUB) 250-50 MCG/ACT Inhalation Aerosol Powder, Breath Activated Inhale 1 puff into the lungs every 12 (twelve) hours. 1 each 5    Blood Glucose Monitoring Suppl (BLOOD GLUCOSE SYSTEM GILLES) Does not  apply Kit Dx. E11.9. Checks qam. 1 kit 0    Blood Glucose Monitoring Suppl w/Device Does not apply Kit Dx. E11.9. Checks qam. 1 kit 0    Blood Gluc Meter Disp-Strips Does not apply Device Dx. E11.9. Checks qam. 100 each 0    gabapentin 100 MG Oral Cap Take 1 capsule (100 mg total) by mouth nightly. 90 capsule 3    Accu-Chek FastClix Lancets Does not apply Misc Check sugar once daily as directed (E11.42) 100 each 2    Blood Glucose Monitoring Suppl (ONETOUCH VERIO) w/Device Does not apply Kit 1 each daily. Test 1x daily 1 kit 0    OneTouch Delica Lancets 33G Does not apply Misc 4 x daily 100 each 1    acetaminophen 500 MG Oral Tab Take 1 tablet (500 mg total) by mouth daily.

## 2025-01-18 NOTE — ED PROVIDER NOTES
[facility] Note  Patient: Chester Bautista Age: 84 year old Sex: female      MRN: [id number]  : 1940    Patient Seen in: [facility] Emergency Department    History   No chief complaint on file.    Stated Complaint: dizziness, right arm tingling    History obtained from: patient     This is a very pleasant 84-year-old female history of COPD, diabetes, hypertension, RA, prior AVMs and blood transfusion secondary to GI bleeding, anemia, presents to the ER due to sudden onset room spinning dizziness and feeling off balance as well as tingling in her right fingertips.  This all started at approximately 4:30 PM while she was sitting at home.  States she is occasionally had a cough over the past few days that is nonproductive but denies any chest pain or shortness of breath, no back pain.  She reports some mild pain behind her eyes and states this feels similar to when she had a brain aneurysm rupture many years ago.  Reports 2 associated episodes of nonbloody emesis associated with dizziness today.  Denies any worst of life/thunderclap headache.  Denies any vision changes or loss of vision.  No slurred speech.  Besides tingling in her right hand denies any new numbness weakness or tingling her extremities.  Denies abdominal pain.  She denies diarrhea.  No fevers.  No leg swelling.    Review of Systems:  Review of Systems  Positive for stated complaint: dizziness, right arm tingling. Constitutional and vital signs reviewed. All other systems reviewed and negative except as noted above.    Patient History:  Past Medical History:    Anxiety state    Cancer (HCC)    breast cancer 2018    Cerebellar stroke (HCC)    Chronic obstructive pulmonary disease, unspecified (HCC)    Chronic obstructive pulmonary disease, unspecified (HCC)    COPD (chronic obstructive pulmonary disease) (HCC)    Cystic thyroid nodule    Diabetes (HCC)    Diabetes mellitus (HCC)    Essential hypertension     Exposure to medical diagnostic radiation    High blood pressure    High cholesterol    History of blood transfusion    low hemoglobin    Osteoarthritis    PONV (postoperative nausea and vomiting)    Pulmonary embolism (HCC)    Pulmonary emphysema (HCC)    Rheumatoid arthritis (HCC)       Past Surgical History:   Procedure Laterality Date    Cataract extraction w/  intraocular lens implant Bilateral 2017    Dr in [facility]     Colonoscopy      Colonoscopy N/A 07/21/2022    Procedure: COLONOSCOPY;  Surgeon: Mikey Garcia MD;  Location: [facility] ENDOSCOPY    Colonoscopy N/A 06/15/2023    Procedure: COLONOSCOPY;  Surgeon: Mikey Garcia MD;  Location: [facility] ENDOSCOPY    Correct bunion,simple      right foot  1993    Egd  12/21/2023    Dr. Garcia; Duodenal AVM's x4 cauterized    Hysterectomy      1972, no abnormal Paps, due to heavy bleeding with fibroids.  Not sure if it had bilateral salpingo-oophorectomy.    Ir aneurysm repairm  2010    Computer assisted volumetric craniofomy with clipping of anterior cerebral artery.    Lumpectomy right      Radiation right      Rotator cuff repair      1993    Total knee replacement Right 2010    Total knee replacement Left 07/02/2015    Transoral incisionless fundoplication - internal  01/25/2012 Fredy fundoplication at [facility] Dr. Fournier.    Fredy fundoplication at [facility] Dr. Fournier.    Trigger finger release      left hand  2005    Yag capsulotomy - ou - both eyes Bilateral 09/2021    done in [place]        Family History   Problem Relation Age of Onset    Heart Surgery Mother         Leaky valve at 41 y/o.     Prostate Cancer Brother     Cancer Brother     Diabetes Maternal Grandmother     Diabetes Paternal Aunt     Breast Cancer Self 78    Breast Cancer Niece         before 50    Macular degeneration Neg     Glaucoma Neg        Specific Social Determinants of Health:   Social History     Socioeconomic History    Marital status:     Tobacco Use    Smoking status: Former     Current packs/day: 0.00     Types: Cigarettes     Quit date:      Years since quittin.0     Passive exposure: Never    Smokeless tobacco: Never    Tobacco comments:     [id number]   Vaping Use    Vaping status: Never Used   Substance and Sexual Activity    Alcohol use: Never    Drug use: Never   Social History Narrative    Just moved in from [place].   passed away and that is why moved from [place] to be with daughter who lives in [place].  Currently lives with daughter.     Social Drivers of Health     Financial Resource Strain: Low Risk  (10/22/2024)    Financial Resource Strain     Difficulty of Paying Living Expenses: Not very hard     Med Affordability: No   Food Insecurity: No Food Insecurity (2024)    NCSS - Food Insecurity     Worried About Running Out of Food in the Last Year: No     Ran Out of Food in the Last Year: No   Transportation Needs: No Transportation Needs (2024)    NCSS - Transportation     Lack of Transportation: No   Housing Stability: Not At Risk (2024)    NCSS - Housing/Utilities     Has Housing: Yes     Worried About Losing Housing: No     Unable to Get Utilities: No           PSFH elements reviewed from today and agreed except as otherwise stated in HPI.    Physical Exam     ED Triage Vitals [25 1834]   BP (!) 177/83   Pulse 88   Resp 20   Temp 97.7 °F (36.5 °C)   Temp src Oral   SpO2 93 %   O2 Device None (Room air)       Current:BP (!) 166/86   Pulse 85   Temp 97.7 °F (36.5 °C) (Oral)   Resp 18   Ht 154.9 cm (5' 1\")   Wt 67.1 kg   SpO2 93%   BMI 27.96 kg/m²         Physical Exam  Vitals and nursing note reviewed.   Constitutional:       General: She is not in acute distress.     Appearance: She is not ill-appearing.   HENT:      Head: Normocephalic and atraumatic.      Right Ear: External ear normal.      Left Ear: External ear normal.      Nose: Nose normal.      Mouth/Throat:       Mouth: Mucous membranes are moist.      Pharynx: Oropharynx is clear.   Eyes:      Extraocular Movements: Extraocular movements intact.      Conjunctiva/sclera: Conjunctivae normal.      Pupils: Pupils are equal, round, and reactive to light.   Cardiovascular:      Rate and Rhythm: Normal rate and regular rhythm.      Heart sounds: No murmur heard.     Comments: +2 radial and DP pulses bilaterally  Pulmonary:      Effort: Pulmonary effort is normal. No respiratory distress.      Breath sounds: No wheezing or rales.   Abdominal:      General: There is no distension.      Palpations: Abdomen is soft.      Tenderness: There is no abdominal tenderness. There is no guarding or rebound.   Musculoskeletal:         General: No deformity.      Cervical back: Neck supple.      Right lower leg: No edema.      Left lower leg: No edema.   Skin:     General: Skin is warm and dry.      Capillary Refill: Capillary refill takes less than 2 seconds.   Neurological:      General: No focal deficit present.      Mental Status: She is alert and oriented to person, place, and time.      Cranial Nerves: No cranial nerve deficit.      Comments: Strength is 5/5 bilateral upper extremities on handgrip, elbow flexion/extension, shoulder lateral abduction/adduction, bilateral lower extremities on hip flexion and ankle plantar/dorsiflexion. SILT bilat UE and LE throughout and symmetric. Finger to nose intact bilaterally.  No pronator drift in arms or legs.     Psychiatric:         Mood and Affect: Mood normal.         ED Course   Labs:   Labs Reviewed   BASIC METABOLIC PANEL (8) - Abnormal; Notable for the following components:       Result Value    Glucose 229 (*)     All other components within normal limits   CBC WITH DIFFERENTIAL WITH PLATELET - Abnormal; Notable for the following components:    WBC 11.2 (*)     HGB 9.2 (*)     HCT 30.7 (*)     MCV 79.3 (*)     MCH 23.8 (*)     MCHC 30.0 (*)     RDW-SD 50.4 (*)     RDW 17.4 (*)      Neutrophil Absolute Prelim 9.35 (*)     Neutrophil Absolute 9.35 (*)     All other components within normal limits   URINALYSIS WITH CULTURE REFLEX - Abnormal; Notable for the following components:    Glucose Urine 100 (*)     Protein Urine 70 (*)     Leukocyte Esterase Urine 250 (*)     WBC Urine 21-50 (*)     Bacteria Urine Rare (*)     Squamous Epi. Cells Few (*)     All other components within normal limits   TSH W REFLEX TO FREE T4 - Abnormal; Notable for the following components:    TSH 0.418 (*)     All other components within normal limits   POCT GLUCOSE - Abnormal; Notable for the following components:    POC Glucose  282 (*)     All other components within normal limits   TROPONIN I HIGH SENSITIVITY - Normal   PTT, ACTIVATED - Normal   PROTHROMBIN TIME (PT) - Normal   T4, FREE (S) - Normal   FREE T3 (TRIIODOTHYRONINE) - Normal   SARS-COV-2/FLU A AND B/RSV BY PCR (GENEXPERT) - Normal    Narrative:     This test is intended for the qualitative detection and differentiation of SARS-CoV-2, influenza A, influenza B, and respiratory syncytial virus (RSV) viral RNA in nasopharyngeal or nares swabs from individuals suspected of respiratory viral infection consistent with COVID-19 by their healthcare provider. Signs and symptoms of respiratory viral infection due to SARS-CoV-2, influenza, and RSV can be similar.    Test performed using the Xpert Xpress SARS-CoV-2/FLU/RSV (real time RT-PCR)  assay on the GeneXpert instrument, [email or web address], [place] [address].   This test is being used under the Food and Drug Administration's Emergency Use Authorization.    The authorized Fact Sheet for Healthcare Providers for this assay is available upon request from the laboratory.   URINE CULTURE, ROUTINE     Radiology findings:    CTA CHEST+CTA ABDOMEN DISSECT SET (CPT=71275/38233)    Result Date: 1/17/2025  CONCLUSION:  1. Acute pulmonary emboli involving the bilateral lower lobe subsegmental pulmonary artery branches.  2. No  evidence of aortic dissection.  3. Ascending thoracic aortic ectasia. Multifocal irregular partially ulcerated peripheral mural thrombus is observed throughout the thoracic and abdominal aorta.   4. Dilatation of the main pulmonary artery trunk may relate to underlying pulmonary hypertension.   5. Moderate to severe centrilobular/paraseptal emphysema with extensive postradiation fibrosis and chronic opacity of the right middle lobe.  6. Multifocal pleuroparenchymal scarring is apparent.  7. Proximal distal colonic diverticulosis without CT evidence of acute complication in the imaged volume.  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  9. Lesser incidental findings as above.    Results of this examination were discussed with the patient's physician, Dr. Cottrell, by Dr. Gruber at 2213 on 01/17/2025.   Dictated by (CST): Sharath Gruber MD on 1/17/2025 at 10:10 PM     Finalized by (CST): Sharath Gruber MD on 1/17/2025 at 10:22 PM          XR CHEST AP PORTABLE  (CPT=71045)    Result Date: 1/17/2025  CONCLUSION:  1. Unchanged chronic fibrotic changes throughout the lungs.  2. Cardiomegaly.    Dictated by (CST): Sharath Gruber MD on 1/17/2025 at 7:40 PM     Finalized by (CST): Sharath Gruber MD on 1/17/2025 at 7:42 PM          CT STROKE CTA BRAIN/CTA NECK (W IV)(CPT=70496/99918)    Result Date: 1/17/2025  CONCLUSION:  1. Negative for hemodynamically significant stenosis or occlusion of the anterior or posterior circulations.   2. The internal carotid arteries demonstrate tortuosity proximally without evidence of hemodynamically stenosis or occlusion.  3. The vertebral arteries demonstrate contiguous patency bilaterally without evidence of flow-limiting stenosis.  4. 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 Thrombus extends into the proximal left subclavian artery origin, contributing to luminal narrowing.  5. Dilatation of the main pulmonary artery trunk may relate to underlying pulmonary  hypertension.  6. 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   7. Lesser incidental findings as above.   Dictated by (CST): Sharath Gruber MD on 1/17/2025 at 7:27 PM     Finalized by (CST): Sharath Gruber MD on 1/17/2025 at 7:36 PM          CT STROKE BRAIN (NO IV)(CPT=70450)    Result Date: 1/17/2025  CONCLUSION:  1. No acute intracranial hemorrhage or evidence of extended large vessel infarction. If there is clinical concern for acute ischemia, follow-up MRI suggested.   2. Postoperative changes of frontal craniotomy with anterior parafalcine aneurysm clipping.  3. Senescent changes of parenchymal volume loss with sequela of chronic microvascular ischemic disease.  4. There is large vessel atherosclerosis of the anterior and posterior intracranial circulations.  5. Lesser incidental findings as above.    This report was called immediately at 1915 hours to Emergency Department Pod 3 and discussed with the patient's ER physician, Dr. Cottrell.    Dictated by (CST): Sharath Gruber MD on 1/17/2025 at 7:11 PM     Finalized by (CST): Sharath Gruber MD on 1/17/2025 at 7:15 PM           EKG as interpreted by me: My interpretation of EKG shows normal sinus rhythm rate 84 beats minute, minimal voltage criteria for LVH, no STEMI.  QTc 449 ms.  When compared to prior EKG from October 2022, no significant changes.  Cardiac Monitor: Interpreted by me.   Pulse Readings from Last 1 Encounters:   01/17/25 85   , sinus,         MDM   This patient presents with sudden onset room spinning dizziness this afternoon with tingling in her right fingertips.  Patient hemodynamically stable, hypertensive on arrival.  No focal reproducible neurologic deficits or cerebellar signs on exam.    Differential diagnoses considered includes, but is not limited to:   Cerebellar stroke  Arterial dissection  Labyrinthitis or vertigo  Electrolyte abnormality  Atypical ACS, less likely given lack of any chest pain, shortness of  breath.  Low suspicion for arterial dissection given lack of any pulse deficits, chest pain or back pain.    TNK/ NI Documentation:    Date/Time last known well:   1/17/2025 3:00 PM    NIHSS on presentation: 0     No chief complaint on file.    IV Tenecteplase (TNK) administered: No; Patient is not a Candidate for IV TNK due to: Mild nondisabling symptoms or rapidly improving symptoms, History of Intracranial hemorrhage, and history of GI bleed    Candidate for Endovascular thrombectomy (EVT): No; Patient is not a candidate for Endovascular Thrombectomy due to: No large vessel occlusion ( LVO)  on CTA/MRA imaging          Will obtain the following tests: ct head, cta head and neck, cbc, cmp, trop, ecg, cxr, rapid MRI brain   Will administer the following medications/therapies: meclizine     Please see ED course for my independent review of these tests/imaging results.        ED Course as of 01/18/25 0027  ------------------------------------------------------------  Time: 01/17 1908  Comment: On my independent interpretation of patient's head CT, no brain bleed  ------------------------------------------------------------  Time: 01/17 1910  Comment: Case has been discussed with neurology Dr. Ronald March agrees pt is not a candidate for TNK given her exam and history. Will proceed with CT/Cta and rapid MRI. If negative will plan for outpatient follow up and hold off on antiplatelet agents given her history of GIB.   ------------------------------------------------------------  Time: 01/17 1938  Value: CT STROKE CTA BRAIN/CTA NECK (W IV)(CPT=70496/73561)  Comment:   Impression  CONCLUSION:  1. Negative for hemodynamically significant stenosis or occlusion of the anterior or posterior circulations.       2. The internal carotid arteries demonstrate tortuosity proximally without evidence of hemodynamically stenosis or occlusion.     3. The vertebral arteries demonstrate contiguous patency bilaterally without evidence of  flow-limiting stenosis.     4. 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 Thrombus extends into the proximal left subclavian artery origin, contributing to luminal narrowing.     5. Dilatation of the main pulmonary artery trunk may relate to underlying pulmonary hypertension.     6. 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       7. Lesser incidental findings as above.    ------------------------------------------------------------  Time: 01/17 1947  Value: XR CHEST AP PORTABLE  (CPT=71045)  Comment: FINDINGS:  POSITION: The patient is slightly rotated to the left.  DEVICES: There is a right-sided Port-A-Cath with tip projecting near the cavoatrial junction.  CARDIAC/VASC: The cardiomediastinal silhouette is accentuated by AP technique, but likely stably enlarged. There is mild tortuosity of the thoracic aorta with peripheral atherosclerotic vascular calcification. The pulmonary vascularity is within normal  limits.  MEDIAST/DYLON: No visible mass or adenopathy.  LUNGS/PLEURA: The lungs are hyperexpanded. There are relative lucencies of the upper lobes bilaterally, and coarsened bronchovascular markings are apparent. Extensive interstitial opacities are demonstrated throughout the lungs bilaterally. Fibrotic  changes and architectural distortion are re-identified. No airspace consolidation, pleural effusion, or pneumothorax is evident.    BONES: Mild scoliosis and multilevel degenerative changes of the thoracic spine are apparent. There are degenerative changes of shoulders bilaterally; there is superior migration of the right humeral head with narrowing of the right acromiohumeral  interval, suggesting chronic full-thickness rotator cuff tear.  OTHER: Leads overlie the chest and obscure underlying detail.                   Impression  CONCLUSION:  1. Unchanged chronic fibrotic changes throughout the lungs.     2.  Cardiomegaly.    ------------------------------------------------------------  Time: 01/17 2034  Value: Hemoglobin(!): 9.2  Comment: (Reviewed)  ------------------------------------------------------------  Time: 01/17 2034  Comment: Trop normal. Coags wnl. Bmp unremarkable.   ------------------------------------------------------------  Time: 01/17 2129  Comment: UA borderline UTI. Will give bactrim   ------------------------------------------------------------  Time: 01/17 2212  Comment: Small subsegmental PE on CTA. States irregular thrombus is chronic for patient not new.   ------------------------------------------------------------  Time: 01/17 2230  Comment: Impression  CONCLUSION:  1. Acute pulmonary emboli involving the bilateral lower lobe subsegmental pulmonary artery branches.     2. No evidence of aortic dissection.     3. Ascending thoracic aortic ectasia. Multifocal irregular partially ulcerated peripheral mural thrombus is observed throughout the thoracic and abdominal aorta.       4. Dilatation of the main pulmonary artery trunk may relate to underlying pulmonary hypertension.      5. Moderate to severe centrilobular/paraseptal emphysema with extensive postradiation fibrosis and chronic opacity of the right middle lobe.     6. Multifocal pleuroparenchymal scarring is apparent.     7. Proximal distal colonic diverticulosis without CT evidence of acute complication in the imaged volume.     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     9. Lesser incidental findings as above.    ------------------------------------------------------------  Time: 01/17 2244  Comment: MRI brain without contrast      IMPRESSION:    There are 2 foci of restricted diffusion in the left cerebellum measuring up to 6 mm, consistent with small infarcts.  No mass effect, hydrocephalus, or midline shift.  Mild diffuse deep white matter changes, consistent with chronic small vessel ischemic disease.  Surgical clips in  the anterior cranial fossa create artifact limiting visualization of the frontal lobes.    ------------------------------------------------------------  Time: 01/17 2317  Comment: Discussed case with hospitalist and neurology, recommend 81 mg asa and hold off on heparin for now. Will tx uti with cephalexin as well. Hospitalist would like to hold asa for now pending neuro eval and vascular eval in [place] per Dr. Agrawal nothing to do acutely for chronic mural thrombus can follow up outpatient             Procedures:  Procedures      Due to a high probability of clinically significant, life threatening deterioration, the patient required my highest level of preparedness to intervene emergently and I personally spent 50 minutes of critical care time directly and personally managing the patient. This critical care time included obtaining a history; examining the patient; pulse oximetry; ordering and review of studies; arranging urgent treatment with development of a management plan; evaluation of patient's response to treatment; frequent reassessment; and, discussions with other providers.    This critical care time was performed to assess and manage the high probability of imminent, life-threatening deterioration that could result in multi-organ failure. It was exclusive of separately billable procedures and treating other patients and teaching time.    Disposition and Plan     Clinical Impression:  1. Cerebellar stroke (HCC)    2. Dizziness    3. Right hand paresthesia    4. Thrombus of aorta (HCC)    5. Bilateral pulmonary embolism (HCC)    6. Acute cystitis without hematuria        Disposition:  Admit          Hospital Problems       Present on Admission  Date Reviewed: 1/16/2025            ICD-10-CM Noted POA    * (Principal) Cerebellar stroke (HCC) I63.9 1/17/2025 Unknown        This note may have been created using voice dictation technology and may include inadvertent errors.      Marcelle Cottrell DO  Attending  Physician   Emergency Medicine

## 2025-01-18 NOTE — PROGRESS NOTES
[facility]  part of [facility]    Progress Note    Chester Bautista Patient Status:  Inpatient    1940 MRN [id number]   Location [facility] 3W/SW Attending Richard Guerra MD   Hosp Day # 0 PCP Heike Sorto MD       Subjective:   Chester Bautista is a(n) 84 year old female was seen and examined  No acute events overnight  Resting in bed, comfortably   Daughter at bedside   No cp, sob, f,c,n,v, abd pain or HA     Objective:   Blood pressure 157/69, pulse 80, temperature 97.7 °F (36.5 °C), temperature source Oral, resp. rate 18, height 5' 1\" (1.549 m), weight 136 lb 12.8 oz (62.1 kg), SpO2 95%.    General: AAF, NAD  HEENT: Normocephalic atraumatic. Moist mucous membranes.   Respiratory: Clear to auscultation bilaterally. No wheezes. No rhonchi.  Cardiovascular: RRR  Abdomen: Soft, nontender, nondistended.  Neurologic: No focal neurological deficits.   Musculoskeletal: Moves all extremities.  Extremities: No edema or cyanosis.  Integument: No rashes or lesions.   Psychiatric: Appropriate mood and affect.    Current Inpatient Medications:     Current Facility-Administered Medications:     meclizine (Antivert) tab 12.5 mg, 12.5 mg, Oral, TID PRN    sodium chloride 0.9% infusion, , Intravenous, Continuous    acetaminophen (Tylenol) tab 650 mg, 650 mg, Oral, Q4H PRN **OR** acetaminophen (Tylenol) rectal suppository 650 mg, 650 mg, Rectal, Q4H PRN    labetalol (Trandate) 5 mg/mL injection 10 mg, 10 mg, Intravenous, Q10 Min PRN    hydrALAzine (Apresoline) 20 mg/mL injection 10 mg, 10 mg, Intravenous, Q2H PRN    ondansetron (Zofran) 4 MG/2ML injection 4 mg, 4 mg, Intravenous, Q6H PRN    metoclopramide (Reglan) 5 mg/mL injection 5 mg, 5 mg, Intravenous, Q8H PRN    acetaminophen (Tylenol) tab 650 mg, 650 mg, Oral, Q6H PRN    ondansetron (Zofran) 4 MG/2ML injection 4 mg, 4 mg, Intravenous, Q6H PRN    albuterol (Ventolin) (2.5 MG/3ML) 0.083% nebulizer solution 2.5 mg,  2.5 mg, Nebulization, Q4H PRN    [Held by provider] carvedilol (Coreg) tab 12.5 mg, 12.5 mg, Oral, BID with meals    cyclobenzaprine (Flexeril) tab 10 mg, 10 mg, Oral, Nightly PRN    famotidine (Pepcid) tab 20 mg, 20 mg, Oral, BID    fluticasone-salmeterol (Advair Diskus) 250-50 MCG/ACT inhaler 1 puff, 1 puff, Inhalation, 2 times per day    gabapentin (Neurontin) cap 100 mg, 100 mg, Oral, Nightly    cetirizine (ZyrTEC) tab 10 mg, 10 mg, Oral, Daily    montelukast (Singulair) tab 10 mg, 10 mg, Oral, Nightly    predniSONE (Deltasone) tab 5 mg, 5 mg, Oral, Daily    rosuvastatin (Crestor) tab 20 mg, 20 mg, Oral, Nightly    [Held by provider] losartan (Cozaar) tab 50 mg, 50 mg, Oral, Daily    nitrofurantoin monohydrate macro (Macrobid) cap 100 mg, 100 mg, Oral, BID with meals    hydroxychloroquine (Plaquenil) tab 300 mg, 300 mg, Oral, Daily    oxyCODONE immediate release tab 5 mg, 5 mg, Oral, Q6H PRN    Assessment and Plan:   #Left Cerebellar Infarcts  - pt with new sudden onset neuro changes  - 2 small cerebellar infarcts seen on admission MRI  -Neurology consulted  -Patient already took 3 baby aspirin's prior to coming to ED   -Neurology consulted  - CTA negative for LVO, but showing small subsegmental Pes  -f/u lipid panel, A1c, TSH, ECHO   -Continue statin, will add plavix  -PT/OT/ST eval     #Dizziness  Improved   -Likely in setting of above  -Meclizine prn   -Neurology consulted     #PE   -Patient is hemodynamically stable, EKG NSR and troponin not elevated.  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 Already took 3 baby aspirins prior to admission, will hold off further AC for now in setting of prior Anemia and GI AVMs   -Previously had IVC filter placed in March 2024 for PE at that time (removed June 2024).   -IR consult for IVC eval again   -Needs eval for hypercoagulable state. Risk factors of recent car ride, overall sedentary state, breast cancer.  Sees Dr. 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     Results:     Recent Labs   Lab 01/16/25  1419 01/17/25 2002 01/18/25  0943   RBC 3.39* 3.87 3.40*   HGB 8.1* 9.2* 8.2*   HCT 27.3* 30.7* 26.7*   MCV 80.5 79.3* 78.5*   MCH 23.9* 23.8* 24.1*   MCHC 29.7* 30.0* 30.7*   RDW 17.2* 17.4* 17.2*   NEPRELIM 8.16* 9.35* 6.07   WBC 10.1 11.2* 9.1   .0 328.0 301.0         Recent Labs   Lab 01/17/25 2002 01/18/25  0943   * 204*   BUN 16 15   CREATSERUM 0.93 0.87   EGFRCR 60 65   CA 9.3 9.0    139   K 3.7 3.9    104   CO2 26.0 27.0         Imaging:   MRI BRAIN WO ACUTE (3) SEQUENCE (CPT=70551)    Result Date: 1/18/2025  CONCLUSION:   Limited 3-sequence stroke protocol study was performed. Within these parameters:  1. Acute to early subacute lacunar infarctions in the left cerebellar hemisphere. 2. Stable postoperative changes from a remote frontal craniotomy and anterior parafalcine aneurysm clipping. 3. Stable mild chronic microangiopathic ischemic changes.   Results of this examination were discussed with the patient's nurse, Deepak, by the on-call teleradiologist Dr. Smiley Trejo at 22:32 on 01/17/2025.  A preliminary report was issued by the [email or web address] teleradiology service. There are no major discrepancies.   Dictated by (CST): Isabela  MD Abraham on 1/18/2025 at 10:11 AM     Finalized by (CST): Abraham Mar MD on 1/18/2025 at 10:16 AM          US VENOUS DOPPLER LEG BILAT - DIAG IMG (CPT=93970)    Result Date: 1/18/2025  CONCLUSION:  No DVT in either lower extremity.   A preliminary report was issued by the [facility] teleradiology service. There are no major discrepancies.  Dictated by (CST): Abraham Mar MD on 1/18/2025 at 9:32 AM     Finalized by (CST): Abraham Mar MD on 1/18/2025 at 9:33 AM          CTA CHEST+CTA ABDOMEN DISSECT SET (CPT=71275/08111)    Result Date: 1/17/2025  CONCLUSION:  1. Acute pulmonary emboli involving the bilateral lower lobe subsegmental pulmonary artery branches.  2. No evidence of aortic dissection.  3. Ascending thoracic aortic ectasia. Multifocal irregular partially ulcerated peripheral mural thrombus is observed throughout the thoracic and abdominal aorta.   4. Dilatation of the main pulmonary artery trunk may relate to underlying pulmonary hypertension.   5. Moderate to severe centrilobular/paraseptal emphysema with extensive postradiation fibrosis and chronic opacity of the right middle lobe.  6. Multifocal pleuroparenchymal scarring is apparent.  7. Proximal distal colonic diverticulosis without CT evidence of acute complication in the imaged volume.  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  9. Lesser incidental findings as above.    Results of this examination were discussed with the patient's physician, Dr. Cottrell, by Dr. Gruber at 2213 on 01/17/2025.   Dictated by (CST): Sharath Gruber MD on 1/17/2025 at 10:10 PM     Finalized by (CST): Sharath Gruber MD on 1/17/2025 at 10:22 PM          XR CHEST AP PORTABLE  (CPT=71045)    Result Date: 1/17/2025  CONCLUSION:  1. Unchanged chronic fibrotic changes throughout the lungs.  2. Cardiomegaly.    Dictated by (CST): Sharath Gruber MD on 1/17/2025 at 7:40 PM     Finalized by (CST): Sharath Gruber MD on 1/17/2025 at 7:42 PM           CT STROKE CTA BRAIN/CTA NECK (W IV)(CPT=70496/94676)    Result Date: 1/17/2025  CONCLUSION:  1. Negative for hemodynamically significant stenosis or occlusion of the anterior or posterior circulations.   2. The internal carotid arteries demonstrate tortuosity proximally without evidence of hemodynamically stenosis or occlusion.  3. The vertebral arteries demonstrate contiguous patency bilaterally without evidence of flow-limiting stenosis.  4. 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 Thrombus extends into the proximal left subclavian artery origin, contributing to luminal narrowing.  5. Dilatation of the main pulmonary artery trunk may relate to underlying pulmonary hypertension.  6. 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   7. Lesser incidental findings as above.   Dictated by (CST): Sharath Gruber MD on 1/17/2025 at 7:27 PM     Finalized by (CST): Sharath Gruber MD on 1/17/2025 at 7:36 PM          CT STROKE BRAIN (NO IV)(CPT=70450)    Result Date: 1/17/2025  CONCLUSION:  1. No acute intracranial hemorrhage or evidence of extended large vessel infarction. If there is clinical concern for acute ischemia, follow-up MRI suggested.   2. Postoperative changes of frontal craniotomy with anterior parafalcine aneurysm clipping.  3. Senescent changes of parenchymal volume loss with sequela of chronic microvascular ischemic disease.  4. There is large vessel atherosclerosis of the anterior and posterior intracranial circulations.  5. Lesser incidental findings as above.    This report was called immediately at 1915 hours to Emergency Department Pod 3 and discussed with the patient's ER physician, Dr. Cottrell.    Dictated by (CST): Sharath Gruber MD on 1/17/2025 at 7:11 PM     Finalized by (CST): Sharath Gruber MD on 1/17/2025 at 7:15 PM         EKG 12 Lead    Result Date: 1/18/2025  Normal sinus rhythm Minimal voltage criteria for LVH, may be normal variant ( R in aVL )  Inferior infarct , age undetermined Anterior infarct (cited on or before 22-OCT-2024) Abnormal ECG When compared with ECG of 22-OCT-2024 12:36, Inferior infarct is now Present Nonspecific T wave abnormality, improved in Inferior leads       Richard Guerra MD  1/18/2025

## 2025-01-18 NOTE — CONSULTS
[facility]  Report of GI Consultation    Chester Bautista Patient Status:  Inpatient    1940 MRN [id number]   Location [facility] 3W/SW Attending Richard Guerra MD   Hosp Day # 0 PCP Heike Sorto MD     Date of Admission:  2025  Date of Consult:  2025  PCP: Heike Sorto MD    Reason for Consultation:   Chronic obscure GI bleeding and chronic blood loss anemia; acute cerebellar CVA event possible thrombotic event    History of Present Illness:     Chester Bautista is a BEAUTIFUL and articulate 84 year old woman with Body mass index is 25.85 kg/m². 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    Tonight, she denies ever seeing wild melena or hematochezia with the occult GI bleeding.  She describes repeatedly becoming aware of worsening fatigue and dyspnea, found to have anemia on outpatient labs.    Well-known to Dr. Mikey Garcia and more recently Zoila Mcqueen of our group for known chronic occult GI bleeding as per procedures below.    This time, Ms. Bautista was admitted for neurologic event.  She presented to the ED last night 2025 reporting dizziness/room spinning, numbness and tingling in right arm, feeling off balance.    Neurologic deficits appear to be improving.    Took aspirin on the way to the ED.    Seen by Neurology service, started Plavix anticoagulation today 2024 approximately 1300.    Also on atorvastatin lipid-lowering therapy, Carvedilol; Plaquenil/hydroxychloroquine.    As below, started octreotide  (Sandostatin LAR) IM injection 20 mg monthly mid 2024        CT CHEST  1/17/2025:  \"Ascending thoracic aortic ectasia. Multifocal irregular partially ulcerated peripheral mural thrombus is observed throughout the thoracic and abdominal aorta.     4. Dilatation of the main pulmonary artery trunk may relate to underlying pulmonary hypertension.  \"    CT STROKE CTA BRAIN/CTA NECK 1/17/2025:  \"Extensive irregular thrombus of the aortic arch is noted. Thrombus extends into the proximal left subclavian artery origin, contributing to luminal narrowing.    ... Dilatation of the main pulmonary artery trunk may relate to underlying pulmonary hypertension.\"    MRI BRAIN 1/17/2025:  \"2 foci of restricted diffusion in the left cerebellar hemisphere measuring up to 6 mm with associated T2/FLAIR hyperintensity.  ...  Acute to early subacute lacunar infarctions in the left cerebellar hemisphere.\"    Bilateral lower extremity ultrasound Doppler exam 1/18/2025 showed no DVT either leg.      Recent labs:  Hgb 8.1g on 1/16/2025 --> 8.2g today  Normal renal function  Last LFTs 11/29/2024 showed AST 31 ALT 14  Hemoglobin A1c 10.1%    Baseline hemoglobin appears to be 8-10.5 g.    Looks like recent transfusions include March 2024, February 2024, May 2022, total 5 units RBCs per Epic record    Uday hemoglobin levels here:  5/2/2022 Hemoglobin 6.0g   5/2/2023 Hemoglobin 8.6g   2/26/2024 Hemoglobin 6.8g   3/1/2024 Hemoglobin 8.2g   3/6/2024 Hemoglobin 7.2g     Iron studies have repeatedly shown low serum iron, low iron saturation going back to November 2021  Serum Ferritins have typically been normal to high, at least twice greater than 1000 possibly acute/inflammatory reactant?    Recent procedures:    3/9/2024 VCE video capsule exam attempted but unsuccessful due to capsule not leaving the stomach    2/28/2024 EGD examination with push enteroscopy Dr. Zoila Mcqueen:  Three nonbleeding AVMs ablated in the stomach  Over 6-7 \"small to medium sized\" nonbleeding AVMs seen but not cauterized in the duodenum  Plan  initiated for octreotide injections    2/27/2024 EGD examination with push enteroscopy Dr. Zoila Mcqueen:  Two small AVM lesions in the stomach, 1 actively bleeding, both cauterized  Solid food in the stomach limited the exam that day; additional AVMs were suspected    12/21/2023 EGD examination with push enteroscopy Dr. Mikey Garcia:  Four tiny \" pinpoint\" AVM lesions cauterized in the duodenum, one actively bleeding    12/11/2023 VCE video capsule examination Dr. Mikey Garcia:  Small bowel passage time 4 hours 28 minutes with adequate prep  Multiple small bowel AVM lesions noted with 1 area of slow active bleeding    6/15/2023 EGD/colonoscopy examination Dr. Mikey Garcia:  Indication: Severe anemia  4mm gastric AVM lesion, cauterized  Three 2mm duodenal AVM lesions, cauterized  2mm cecal AVM lesion, cauterized  Pancolonic diverticulosis  Normal terminal ileum    7/21/2022 EGD/colonoscopy examinations Dr. Mikey Garcia:  Indication: Severe anemia  Two 2mm duodenal AVM lesions, cauterized  4mm cecal AVM lesion, cauterized  1 small 4mm colon polyp  Pancolonic diverticulosis  Normal terminal ileum        Wt Readings from Last 20 Encounters:   01/18/25 136 lb 12.8 oz (62.1 kg)   01/17/25 147 lb 14.9 oz (67.1 kg)   01/16/25 143 lb (64.9 kg)   01/03/25 139 lb (63 kg)   12/16/24 146 lb (66.2 kg)   12/12/24 144 lb 12.8 oz (65.7 kg)   12/03/24 146 lb (66.2 kg)   11/22/24 144 lb (65.3 kg)   11/18/24 144 lb (65.3 kg)   11/13/24 143 lb (64.9 kg)   10/31/24 144 lb (65.3 kg)   10/25/24 142 lb 3.2 oz (64.5 kg)   10/22/24 147 lb (66.7 kg)   10/21/24 143 lb (64.9 kg)   10/17/24 143 lb 9.6 oz (65.1 kg)   10/08/24 146 lb (66.2 kg)   09/26/24 145 lb (65.8 kg)   08/27/24 144 lb 6.4 oz (65.5 kg)   08/06/24 139 lb (63 kg)   08/02/24 141 lb (64 kg)       Pertinent Social History: Former tobacco smoker    Impression:     4 year old woman with Body mass index is 25.85 kg/m². 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    Tonight, she denies ever seeing wild melena or hematochezia with the occult GI bleeding.  She describes repeatedly becoming aware of worsening fatigue and dyspnea, found to have anemia on outpatient labs.    Well-known to Dr. Mikey Garcia and more recently Zoila Mcqueen of our group for known chronic occult GI bleeding as per procedures below.    This time, Ms. Bautista was admitted for neurologic event.  She presented to the ED last night 1/17/2025 reporting dizziness/room spinning, numbness and tingling in right arm, feeling off balance.    Workup showing \"extensive irregular thrombus of aortic arch\" possible embolic source; 2 acute lacunar infarctions of left cerebellum    Plan is to start Plavix anticoagulation.    Recommendations:  GI service has been consulted regarding the issue of starting Plavix anticoagulation in the setting of known chronic obscure GI bleeding, chronic blood loss anemia due to gastric, small bowel, colonic AVM lesions  Today I recommended to Ms. Bautista proceeding with trial of the Plavix anticoagulation while closely monitoring CBC inpatient and outpatient basis.  She appears to be at high risk for further embolic CVA events.  May require ?weekly CBC and iron studies next few months  As per operative notes above, these recurring AVM lesions do not appear amenable to endoscopy and ablation treatments.  Ms. Bautista just started octreotide Sandostatin LAR monthly injections about 9 months ago which appear to be helping.      Thank you for allowing me to participate in the care of your patient.    Jose Haque MD  1/18/2025          Over 80 minutes spent today  reviewing today's and recent data; greater than 50% of that time was spent counseling the patient and coordination of care with RN and other involved physicians.      Digital transcription software was utilized to produce this note. The note was proofread for content only. Typographical errors may remain.            Past Medical History  Past Medical History:    Anxiety state    Cancer (HCC)    breast cancer 2018    Cerebellar stroke (HCC)    Chronic obstructive pulmonary disease, unspecified (HCC)    Chronic obstructive pulmonary disease, unspecified (HCC)    COPD (chronic obstructive pulmonary disease) (HCC)    Cystic thyroid nodule    Diabetes (HCC)    Diabetes mellitus (HCC)    Essential hypertension    Exposure to medical diagnostic radiation    High blood pressure    High cholesterol    History of blood transfusion    low hemoglobin    Osteoarthritis    PONV (postoperative nausea and vomiting)    Pulmonary embolism (HCC)    Pulmonary emphysema (HCC)    Rheumatoid arthritis (HCC)       Past Surgical History  Past Surgical History:   Procedure Laterality Date    Cataract extraction w/  intraocular lens implant B[facility] 2017    [facility]     Colonoscopy      Colonoscopy N/A 07/21/2022    Procedure: COLONOSCOPY;  Surgeon: Mikey Garcia MD;  Location: [facility] ENDOSCOPY    Colonoscopy N/A 06/15/2023    Procedure: COLONOSCOPY;  Surgeon: Mikey Garcia MD;  Location: [facility] ENDOSCOPY    Correct bunion,simple      right foot  1993    Egd  12/21/2023    Dr. Garcia; Duodenal AVM's x4 cauterized    Hysterectomy      1972, no abnormal Paps, due to heavy bleeding with fibroids.  Not sure if it had bilateral salpingo-oophorectomy.    Ir aneurysm repairm  2010    Computer assisted volumetric craniofomy with clipping of anterior cerebral artery.    Lumpectomy right      Radiation right      Rotator cuff repair      1993    Total knee replacement Right 2010    Total knee replacement Left 07/02/2015    Transoral  incisionless fundoplication - internal  01/25/2012 Fredy fundoplication at [facility] Dr. Forunier.    Fredy fundoplication at [facility] Dr. Fournier.    Trigger finger release      left hand  2005    Yag capsulotomy - ou - both eyes Bilateral 09/2021    done in [place]       Family History  Family History   Problem Relation Age of Onset    Heart Surgery Mother         Leaky valve at 41 y/o.     Prostate Cancer Brother     Cancer Brother     Diabetes Maternal Grandmother     Diabetes Paternal Aunt     Breast Cancer Self 78    Breast Cancer Niece         before 50    Macular degeneration Neg     Glaucoma Neg        Social History  Pediatric History   Patient Parents    Not on file     Other Topics Concern    Not on file   Social History Narrative    Just moved in from [place].   passed away and that is why moved from [place] to be with daughter who lives in [place].  Currently lives with daughter.           Current Medications:  Current Facility-Administered Medications   Medication Dose Route Frequency    meclizine (Antivert) tab 12.5 mg  12.5 mg Oral TID PRN    sodium chloride 0.9% infusion   Intravenous Continuous    acetaminophen (Tylenol) tab 650 mg  650 mg Oral Q4H PRN    Or    acetaminophen (Tylenol) rectal suppository 650 mg  650 mg Rectal Q4H PRN    labetalol (Trandate) 5 mg/mL injection 10 mg  10 mg Intravenous Q10 Min PRN    hydrALAzine (Apresoline) 20 mg/mL injection 10 mg  10 mg Intravenous Q2H PRN    ondansetron (Zofran) 4 MG/2ML injection 4 mg  4 mg Intravenous Q6H PRN    metoclopramide (Reglan) 5 mg/mL injection 5 mg  5 mg Intravenous Q8H PRN    acetaminophen (Tylenol) tab 650 mg  650 mg Oral Q6H PRN    ondansetron (Zofran) 4 MG/2ML injection 4 mg  4 mg Intravenous Q6H PRN    albuterol (Ventolin) (2.5 MG/3ML) 0.083% nebulizer solution 2.5 mg  2.5 mg Nebulization Q4H PRN    [Held by provider] carvedilol (Coreg) tab 12.5 mg  12.5 mg Oral BID with meals     cyclobenzaprine (Flexeril) tab 10 mg  10 mg Oral Nightly PRN    famotidine (Pepcid) tab 20 mg  20 mg Oral BID    fluticasone-salmeterol (Advair Diskus) 250-50 MCG/ACT inhaler 1 puff  1 puff Inhalation 2 times per day    gabapentin (Neurontin) cap 100 mg  100 mg Oral Nightly    cetirizine (ZyrTEC) tab 10 mg  10 mg Oral Daily    montelukast (Singulair) tab 10 mg  10 mg Oral Nightly    predniSONE (Deltasone) tab 5 mg  5 mg Oral Daily    rosuvastatin (Crestor) tab 20 mg  20 mg Oral Nightly    [Held by provider] losartan (Cozaar) tab 50 mg  50 mg Oral Daily    nitrofurantoin monohydrate macro (Macrobid) cap 100 mg  100 mg Oral BID with meals    hydroxychloroquine (Plaquenil) tab 300 mg  300 mg Oral Daily    oxyCODONE immediate release tab 5 mg  5 mg Oral Q6H PRN     Medications Prior to Admission   Medication Sig    acetaminophen 325 MG Oral Tab Take 2 tablets (650 mg total) by mouth every 8 (eight) hours.    rosuvastatin 20 MG Oral Tab Take 1 tablet (20 mg total) by mouth nightly.    magnesium oxide 400 MG Oral Tab Take 1 tablet (400 mg total) by mouth in the morning, at noon, and at bedtime. Per Dr. JENSEN Mata    valrtan 80 MG Oral Tab 1  1/2 tabs every 12 hrs. (Patient taking differently: Take 1 tablet (80 mg total) by mouth 2 (two) times daily. 1  1/2 tabs every 12 hrs.)    loratadine 10 MG Oral Tab TAKE 1 TABLET BY MOUTH EVERY DAY    Potassium Chloride ER 20 MEQ Oral Tab CR Take 40 mEq by mouth every morning.    hydroxychloroquine 200 MG Oral Tab Take 1.5 tablets (300 mg total) by mouth daily.    predniSONE 5 MG Oral Tab Take 1 tablet (5 mg total) by mouth daily.    famotidine 20 MG Oral Tab Take 1 tablet (20 mg total) by mouth 2 (two) times daily. Patient gets over the counter    glipiZIDE 5 MG Oral Tab Take 1 tablet (5 mg total) by mouth every morning before breakfast.    albuterol 108 (90 Base) MCG/ACT Inhalation Aero Soln TAKE 2 PUFFS BY MOUTH EVERY 4 TO 6 HOURS AS NEEDED    cyclobenzaprine 10 MG Oral Tab Take 1  tablet (10 mg total) by mouth nightly as needed.    insulin glargine (LANTUS SOLOSTAR) 100 UNIT/ML Subcutaneous Solution Pen-injector Inject 10 Units into the skin daily with dinner. (Patient taking differently: Inject 14 Units into the skin daily with dinner.)    montelukast 10 MG Oral Tab Take 1 tablet (10 mg total) by mouth nightly.    carvedilol 12.5 MG Oral Tab Take 1 tablet (12.5 mg total) by mouth 2 (two) times daily with meals.    fluticasone-salmeterol (WIXELA INHUB) 250-50 MCG/ACT Inhalation Aerosol Powder, Breath Activated Inhale 1 puff into the lungs every 12 (twelve) hours.    gabapentin 100 MG Oral Cap Take 1 capsule (100 mg total) by mouth nightly.    ciprofloxacin 500 MG Oral Tab Take 1 tablet (500 mg total) by mouth 2 (two) times daily for 7 days.    ipratropium 0.02 % Inhalation Solution Take 2.5 mL (500 mcg total) by nebulization 2 (two) times daily.    albuterol (2.5 MG/3ML) 0.083% Inhalation Nebu Soln Take 3 mL (2.5 mg total) by nebulization every 6 (six) hours as needed for Wheezing.    Cholecalciferol (VITAMIN D3) 1.25 MG (88328 UT) Oral Cap 1 po [id number].    [] doxycycline 100 MG Oral Cap Take 1 capsule (100 mg total) by mouth 2 (two) times daily for 7 days. (Patient not taking: Reported on 10/25/2024)    [] albuterol (2.5 MG/3ML) 0.083% Inhalation Nebu Soln Take 3 mL (2.5 mg total) by nebulization every 4 (four) hours as needed for Wheezing or Shortness of Breath.    BD PEN NEEDLE KADEN 2ND GEN 32G X 4 MM Does not apply Misc 1 EACH DAILY. USE DAILY WITH INSULIN    Blood Glucose Monitoring Suppl (BLOOD GLUCOSE SYSTEM GILLES) Does not apply Kit Dx. E11.9. Checks qam.    Blood Glucose Monitoring Suppl w/Device Does not apply Kit Dx. E11.9. Checks qam.    Blood Gluc Meter Disp-Strips Does not apply Device Dx. E11.9. Checks qam.    Accu-Chek FastClix Lancets Does not apply Misc Check sugar once daily as directed (E11.42)    Blood Glucose Monitoring Suppl (ONETOUCH VERIO) w/Device Does  not apply Kit 1 each daily. Test 1x daily    OneTouch Delica Lancets 33G Does not apply Misc 4 x daily    acetaminophen 500 MG Oral Tab Take 1 tablet (500 mg total) by mouth daily.       Allergies  Allergies[1]          Review of Systems:     Neurologic symptoms as above.  No gross melena or GI bleeding.  No nausea or abdominal pain.  No urinary bleeding.  12 point review of systems as above otherwise negative.    Physical Exam:   Blood pressure 157/69, pulse 80, temperature 97.7 °F (36.5 °C), temperature source Oral, resp. rate 18, height 5' 1\" (1.549 m), weight 136 lb 12.8 oz (62.1 kg), SpO2 95%.    GENERAL: Bright articulate outgoing lady  HEENT: Normocephalic; pupils equally round and reactive to light; no temporal wasting; no thyromegaly or cervical lymphadenopathy  PULM: Breathing and speaking comfortably  CV: RRR  ABD: Normoactive bowel sounds; soft/nondistended  EXT: No edema  SKIN: No rash    Results:     Laboratory Data:  Lab Results   Component Value Date    WBC 9.1 01/18/2025    HGB 8.2 (L) 01/18/2025    .0 01/18/2025    CREATSERUM 0.87 01/18/2025    BUN 15 01/18/2025     01/18/2025    K 3.9 01/18/2025    CO2 27.0 01/18/2025    CA 9.0 01/18/2025    ALB 3.5 11/29/2024    ALKPHO 103 10/17/2024    TP 7.2 10/17/2024    AST 31 11/29/2024    ALT 14 11/29/2024    BILT 0.2 10/17/2024    PTT 27.0 01/18/2025    INR 0.94 01/17/2025    PTP 13.1 01/17/2025    TSH 0.418 (L) 01/17/2025    ESRML >130 (H) 11/29/2024    CRP 3.00 (H) 11/29/2024    MG 1.3 (L) 01/16/2025    PHOS 3.9 06/07/2024           No results for input(s): \"URINE\", \"CULTI\", \"BLDSMR\" in the last 168 hours.    Recent Labs   Lab 01/16/25  1419 01/17/25 2002 01/18/25  0943   HGB 8.1* 9.2* 8.2*   WBC 10.1 11.2* 9.1       Imaging:  MRI BRAIN WO ACUTE (3) SEQUENCE (CPT=70551)    Result Date: 1/18/2025  CONCLUSION:   Limited 3-sequence stroke protocol study was performed. Within these parameters:  1. Acute to early subacute lacunar infarctions in  the left cerebellar hemisphere. 2. Stable postoperative changes from a remote frontal craniotomy and anterior parafalcine aneurysm clipping. 3. Stable mild chronic microangiopathic ischemic changes.   Results of this examination were discussed with the patient's nurse, Deepak, by the on-call teleradiologist Dr. Smiley Trejo at 22:32 on 01/17/2025.  A preliminary report was issued by the [facility] teleradiology service. There are no major discrepancies.   Dictated by (CST): Abraham Mar MD on 1/18/2025 at 10:11 AM     Finalized by (CST): Abraham Mar MD on 1/18/2025 at 10:16 AM          US VENOUS DOPPLER LEG BILAT - DIAG IMG (CPT=93970)    Result Date: 1/18/2025  CONCLUSION:  No DVT in either lower extremity.   A preliminary report was issued by the [facility] teleradiology service. There are no major discrepancies.  Dictated by (CST): Abraham Mar MD on 1/18/2025 at 9:32 AM     Finalized by (CST): Abraham Mar MD on 1/18/2025 at 9:33 AM          CTA CHEST+CTA ABDOMEN DISSECT SET (CPT=71275/47172)    Result Date: 1/17/2025  CONCLUSION:  1. Acute pulmonary emboli involving the bilateral lower lobe subsegmental pulmonary artery branches.  2. No evidence of aortic dissection.  3. Ascending thoracic aortic ectasia. Multifocal irregular partially ulcerated peripheral mural thrombus is observed throughout the thoracic and abdominal aorta.   4. Dilatation of the main pulmonary artery trunk may relate to underlying pulmonary hypertension.   5. Moderate to severe centrilobular/paraseptal emphysema with extensive postradiation fibrosis and chronic opacity of the right middle lobe.  6. Multifocal pleuroparenchymal scarring is apparent.  7. Proximal distal colonic diverticulosis without CT evidence of acute complication in the imaged volume.  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  9. Lesser incidental findings as above.    Results of this examination were discussed with the  patient's physician, Dr. Cottrell, by Dr. Gruber at 2213 on 01/17/2025.   Dictated by (CST): Sharath Gruber MD on 1/17/2025 at 10:10 PM     Finalized by (CST): Sharath Gruber MD on 1/17/2025 at 10:22 PM          XR CHEST AP PORTABLE  (CPT=71045)    Result Date: 1/17/2025  CONCLUSION:  1. Unchanged chronic fibrotic changes throughout the lungs.  2. Cardiomegaly.    Dictated by (CST): Sharath Gruber MD on 1/17/2025 at 7:40 PM     Finalized by (CST): Sharath Gruber MD on 1/17/2025 at 7:42 PM          CT STROKE CTA BRAIN/CTA NECK (W IV)(CPT=70496/36486)    Result Date: 1/17/2025  CONCLUSION:  1. Negative for hemodynamically significant stenosis or occlusion of the anterior or posterior circulations.   2. The internal carotid arteries demonstrate tortuosity proximally without evidence of hemodynamically stenosis or occlusion.  3. The vertebral arteries demonstrate contiguous patency bilaterally without evidence of flow-limiting stenosis.  4. 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 Thrombus extends into the proximal left subclavian artery origin, contributing to luminal narrowing.  5. Dilatation of the main pulmonary artery trunk may relate to underlying pulmonary hypertension.  6. 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   7. Lesser incidental findings as above.   Dictated by (CST): Sharath Gruber MD on 1/17/2025 at 7:27 PM     Finalized by (CST): Sharath Gruber MD on 1/17/2025 at 7:36 PM          CT STROKE BRAIN (NO IV)(CPT=70450)    Result Date: 1/17/2025  CONCLUSION:  1. No acute intracranial hemorrhage or evidence of extended large vessel infarction. If there is clinical concern for acute ischemia, follow-up MRI suggested.   2. Postoperative changes of frontal craniotomy with anterior parafalcine aneurysm clipping.  3. Senescent changes of parenchymal volume loss with sequela of chronic microvascular ischemic disease.  4. There is large vessel atherosclerosis of the  anterior and posterior intracranial circulations.  5. Lesser incidental findings as above.    This report was called immediately at 1915 hours to Emergency Department Pod 3 and discussed with the patient's ER physician, Dr. Cottrell.    Dictated by (CST): Sharath Gruber MD on 1/17/2025 at 7:11 PM     Finalized by (CST): Sharath Gruber MD on 1/17/2025 at 7:15 PM                   [1]   Allergies  Allergen Reactions    Celebrex [Celecoxib] PALPITATIONS    Penicillins ITCHING and SWELLING    Amlodipine OTHER (SEE COMMENTS)     Calves Swelling     Metformin And Related UNKNOWN    Olmesartan UNKNOWN

## 2025-01-18 NOTE — PHYSICAL THERAPY NOTE
PHYSICAL THERAPY EVALUATION - INPATIENT     Room Number: 319/319-A  Evaluation Date: 1/18/2025  Type of Evaluation: Initial   Physician Order: PT Eval and Treat    Presenting Problem: cerebellar CVA  Co-Morbidities : COPD, diabetes, HTN, RA, GI bleed  Reason for Therapy: Mobility Dysfunction and Discharge Planning    PHYSICAL THERAPY ASSESSMENT   Patient is a 84 year old female admitted 1/17/2025 for c/o room spinning, off balance, R tingling. Pt endorses that those symptoms have gone away. No c/o being off balance or room spinning with mobility. Prior to admission, patient's baseline is mod indep with ADLs/mobility. Family present to assist as needed. Pt amb with RW or cane. Pt able to make simple meals for herself, dress, bathe. Pt's family has camera system for safety.  Patient is currently functioning near baseline with bed mobility, transfers, gait, and stair negotiation.  Patient is requiring contact guard assist as a result of the following impairments: decreased endurance/aerobic capacity, decreased muscular endurance, and increased O2 needs from baseline.  Physical Therapy will continue to follow for duration of hospitalization.    Patient will benefit from continued skilled PT Services at discharge to promote prior level of function.  Anticipate patient will return home with home health PT.    PLAN DURING HOSPITALIZATION  Nursing Mobility Recommendation : 1 Assist  PT Device Recommendation: Rolling walker;Gait belt  PT Treatment Plan: Bed mobility;Endurance;Energy conservation;Patient education;Family education;Gait training;Strengthening;Stair training;Transfer training;Balance training  Rehab Potential : Good  Frequency (Obs): 5x/week     PHYSICAL THERAPY MEDICAL/SOCIAL HISTORY       Problem List  Principal Problem:    Cerebellar stroke (HCC)  Active Problems:    Dizziness    Right hand paresthesia    Thrombus of aorta (HCC)    Bilateral pulmonary embolism (HCC)    Acute cystitis without  hematuria      HOME SITUATION  Type of Home: House  Home Layout: Two level  Stairs to Enter : 1   Railing: No    Stairs to Bedroom: 14    Railing: Yes    Lives With: Daughter (family assists as needed, pt has cameras at home too)    Drives: No   Patient Regularly Uses: Glasses;Rolling walker;Cane       SUBJECTIVE  \"I'm just tired.\"    PHYSICAL THERAPY EXAMINATION   OBJECTIVE  Precautions: Bed/chair alarm  Fall Risk: Standard fall risk    WEIGHT BEARING RESTRICTION       PAIN ASSESSMENT  Ratin  Location: denies       COGNITION  Overall Cognitive Status:  WFL - within functional limits    RANGE OF MOTION AND STRENGTH ASSESSMENT  Upper extremity ROM and strength are within functional limits   Lower extremity ROM is within functional limits   Lower extremity strength is within functional limits for pt    BALANCE  Static Sitting: Fair +  Dynamic Sitting: Fair  Static Standing: Fair  Dynamic Standing: Fair -    ADDITIONAL TESTS                                    NEUROLOGICAL FINDINGS                      ACTIVITY TOLERANCE  Pulse: 80  Heart Rate Source: Monitor     BP: 157/69  BP Location: Left arm  BP Method: Automatic  Patient Position: Lying    O2 WALK  Oxygen Therapy  SPO2% on Oxygen at Rest: 100  At rest oxygen flow (liters per minute): 2  SPO2% Ambulation on Oxygen: 95  Ambulation oxygen flow (liters per minute): 2    AM-PAC '6-Clicks' INPATIENT SHORT FORM - BASIC MOBILITY  How much difficulty does the patient currently have...  Patient Difficulty: Turning over in bed (including adjusting bedclothes, sheets and blankets)?: A Little   Patient Difficulty: Sitting down on and standing up from a chair with arms (e.g., wheelchair, bedside commode, etc.): A Little   Patient Difficulty: Moving from lying on back to sitting on the side of the bed?: A Little   How much help from another person does the patient currently need...   Help from Another: Moving to and from a bed to a chair (including a wheelchair)?: A Little    Help from Another: Need to walk in hospital room?: A Little   Help from Another: Climbing 3-5 steps with a railing?: A Little     AM-PAC Score:  Raw Score: 18   Approx Degree of Impairment: 46.58%   Standardized Score (AM-PAC Scale): 43.63   CMS Modifier (G-Code): CK    FUNCTIONAL ABILITY STATUS  Functional Mobility/Gait Assessment  Gait Assistance: Contact guard assist  Distance (ft): 40  Assistive Device: Rolling walker  Pattern:  (dec lela)  Rolling: contact guard assist  Supine to Sit: contact guard assist  Sit to Supine:  NT  Sit to Stand: contact guard assist    Exercise/Education Provided:  Bed mobility  Gait training  Lower therapeutic exercise:  Ankle pumps  Transfer training  PT Eval    Skilled Therapy Provided: Pt ok to be seen per RN Thao. RN communicated with Dr. Guerra to confirm pt is ok for therapy d/t PE, pt cleared for mobility/therapy. Pt's supportive dtr at bedside. Pt educ in role of PT and PT POC, pt willing to participate. Pt initially willing to amb and return to bed as she is very tired. Orthostatics taken, negative. Pt cued for hand position for transfers with RW. Inc time for line management. Pt amb with dec lela with RW, keeping RW in front of her with cue x1. Pt cued for RW position for stand to sit in chair. D/w pt and pt's dtr HHPT, they are interested in HH therapies but not HH nursing. Provided warm blankets at the end of the session.    The patient's Approx Degree of Impairment: 46.58% has been calculated based on documentation in the [facility] '6 clicks' Inpatient Basic Mobility Short Form.  Research supports that patients with this level of impairment may benefit from home with HHPT.  Final disposition will be made by interdisciplinary medical team.    Patient End of Session: Up in chair;Needs met;Call light within reach;RN aware of session/findings;All patient questions and concerns addressed;Hospital anti-slip socks;Family present    CURRENT GOALS  Goals to be met by:  1/25/25  Patient Goal Patient's self-stated goal is: go home, be stronger   Goal #1 Patient is able to demonstrate supine - sit EOB @ level: modified independent     Goal #1   Current Status    Goal #2 Patient is able to demonstrate transfers Sit to/from Stand at assistance level: modified independent with walker - rolling     Goal #2  Current Status    Goal #3 Patient is able to ambulate 150 feet with assist device: walker - rolling at assistance level: supervision   Goal #3   Current Status    Goal #4 Patient will negotiate 10 stairs with rail and supervision   Goal #4   Current Status    Goal #5 Patient to demonstrate independence with home activity/exercise instructions provided to patient in preparation for discharge.   Goal #5   Current Status    Goal #6    Goal #6  Current Status      Patient Evaluation Complexity Level:  History Moderate - 1 or 2 personal factors and/or co-morbidities   Examination of body systems Moderate - addressing a total of 3 or more elements   Clinical Presentation Low- Stable   Clinical Decision Making  Low Complexity     Gait Training: 10 minutes  Therapeutic Activity:  15 minutes

## 2025-01-18 NOTE — ED QUICK NOTES
PT to ED, steady gait, alert and orientated x4, with daughter.   PT reporting numbness and tingling in R arm starting at approx 1700.   LKW at approx 1500.   + Balance, denies vision loss or changes, face symmetrical, bilateral extremity strength equal, speech clear.   Discussed with Dr. Bob, stroke alert called.

## 2025-01-19 ENCOUNTER — APPOINTMENT (OUTPATIENT)
Dept: GENERAL RADIOLOGY | Facility: HOSPITAL | Age: 85
End: 2025-01-19
Attending: INTERNAL MEDICINE
Payer: MEDICARE

## 2025-01-19 ENCOUNTER — APPOINTMENT (OUTPATIENT)
Dept: MRI IMAGING | Facility: HOSPITAL | Age: 85
End: 2025-01-19
Attending: Other
Payer: MEDICARE

## 2025-01-19 ENCOUNTER — APPOINTMENT (OUTPATIENT)
Dept: CT IMAGING | Facility: HOSPITAL | Age: 85
End: 2025-01-19
Attending: Other
Payer: MEDICARE

## 2025-01-19 LAB
ANION GAP SERPL CALC-SCNC: 9 MMOL/L (ref 0–18)
BASOPHILS # BLD AUTO: 0.07 X10(3) UL (ref 0–0.2)
BASOPHILS NFR BLD AUTO: 0.5 %
BUN BLD-MCNC: 15 MG/DL (ref 9–23)
BUN/CREAT SERPL: 16.9 (ref 10–20)
CALCIUM BLD-MCNC: 9.7 MG/DL (ref 8.7–10.4)
CHLORIDE SERPL-SCNC: 103 MMOL/L (ref 98–112)
CO2 SERPL-SCNC: 26 MMOL/L (ref 21–32)
CREAT BLD-MCNC: 0.89 MG/DL
DEPRECATED RDW RBC AUTO: 51.1 FL (ref 35.1–46.3)
EGFRCR SERPLBLD CKD-EPI 2021: 63 ML/MIN/1.73M2 (ref 60–?)
EOSINOPHIL # BLD AUTO: 0.31 X10(3) UL (ref 0–0.7)
EOSINOPHIL NFR BLD AUTO: 2 %
ERYTHROCYTE [DISTWIDTH] IN BLOOD BY AUTOMATED COUNT: 17.3 % (ref 11–15)
GLUCOSE BLD-MCNC: 249 MG/DL (ref 70–99)
GLUCOSE BLDC GLUCOMTR-MCNC: 152 MG/DL (ref 70–99)
GLUCOSE BLDC GLUCOMTR-MCNC: 164 MG/DL (ref 70–99)
GLUCOSE BLDC GLUCOMTR-MCNC: 192 MG/DL (ref 70–99)
GLUCOSE BLDC GLUCOMTR-MCNC: 324 MG/DL (ref 70–99)
HCT VFR BLD AUTO: 26.6 %
HGB BLD-MCNC: 8.2 G/DL
IMM GRANULOCYTES # BLD AUTO: 0.12 X10(3) UL (ref 0–1)
IMM GRANULOCYTES NFR BLD: 0.8 %
LYMPHOCYTES # BLD AUTO: 1.83 X10(3) UL (ref 1–4)
LYMPHOCYTES NFR BLD AUTO: 11.8 %
MCH RBC QN AUTO: 24.8 PG (ref 26–34)
MCHC RBC AUTO-ENTMCNC: 30.8 G/DL (ref 31–37)
MCV RBC AUTO: 80.6 FL
MONOCYTES # BLD AUTO: 1.29 X10(3) UL (ref 0.1–1)
MONOCYTES NFR BLD AUTO: 8.3 %
NEUTROPHILS # BLD AUTO: 11.91 X10 (3) UL (ref 1.5–7.7)
NEUTROPHILS # BLD AUTO: 11.91 X10(3) UL (ref 1.5–7.7)
NEUTROPHILS NFR BLD AUTO: 76.6 %
OSMOLALITY SERPL CALC.SUM OF ELEC: 295 MOSM/KG (ref 275–295)
PLATELET # BLD AUTO: 316 10(3)UL (ref 150–450)
POTASSIUM SERPL-SCNC: 3.5 MMOL/L (ref 3.5–5.1)
POTASSIUM SERPL-SCNC: 4.8 MMOL/L (ref 3.5–5.1)
RBC # BLD AUTO: 3.3 X10(6)UL
SODIUM SERPL-SCNC: 138 MMOL/L (ref 136–145)
WBC # BLD AUTO: 15.5 X10(3) UL (ref 4–11)

## 2025-01-19 PROCEDURE — 70450 CT HEAD/BRAIN W/O DYE: CPT | Performed by: OTHER

## 2025-01-19 PROCEDURE — 99291 CRITICAL CARE FIRST HOUR: CPT | Performed by: OTHER

## 2025-01-19 PROCEDURE — 70551 MRI BRAIN STEM W/O DYE: CPT | Performed by: OTHER

## 2025-01-19 PROCEDURE — 99233 SBSQ HOSP IP/OBS HIGH 50: CPT | Performed by: INTERNAL MEDICINE

## 2025-01-19 PROCEDURE — 99233 SBSQ HOSP IP/OBS HIGH 50: CPT | Performed by: HOSPITALIST

## 2025-01-19 PROCEDURE — 71045 X-RAY EXAM CHEST 1 VIEW: CPT | Performed by: INTERNAL MEDICINE

## 2025-01-19 RX ORDER — POTASSIUM CHLORIDE 1500 MG/1
40 TABLET, EXTENDED RELEASE ORAL EVERY 4 HOURS
Status: COMPLETED | OUTPATIENT
Start: 2025-01-19 | End: 2025-01-19

## 2025-01-19 NOTE — PLAN OF CARE
Problem: Patient Centered Care  Goal: Patient preferences are identified and integrated in the patient's plan of care  Description: Interventions:  - Provide timely, complete, and accurate information to patient/family  - Incorporate patient and family knowledge, values, beliefs, and cultural backgrounds into the planning and delivery of care  - Encourage patient/family to participate in care and decision-making at the level they choose  - Honor patient and family perspectives and choices  Outcome: Progressing     Problem: HEMATOLOGIC - ADULT  Goal: Free from bleeding injury  Description: (Example usage: patient with low platelets)  INTERVENTIONS:  - Avoid intramuscular injections, enemas and rectal medication administration  - Ensure safe mobilization of patient  - Hold pressure on venipuncture sites to achieve adequate hemostasis  - Assess for signs and symptoms of internal bleeding  - Monitor lab trends  - Patient is to report abnormal signs of bleeding to staff  - Avoid use of toothpicks and dental floss  - Use electric shaver for shaving  - Use soft bristle tooth brush  - Limit straining and forceful nose blowing  Outcome: Progressing     Problem: Diabetes/Glucose Control  Goal: Glucose maintained within prescribed range  Description: INTERVENTIONS:  - Monitor Blood Glucose as ordered  - Assess for signs and symptoms of hyperglycemia and hypoglycemia  - Administer ordered medications to maintain glucose within target range  - Assess barriers to adequate nutritional intake and initiate nutrition consult as needed  - Instruct patient on self management of diabetes  Outcome: Progressing     Problem: CARDIOVASCULAR - ADULT  Goal: Maintains optimal cardiac output and hemodynamic stability  Description: INTERVENTIONS:  - Monitor vital signs, rhythm, and trends  - Monitor for bleeding, hypotension and signs of decreased cardiac output  - Evaluate effectiveness of vasoactive medications to optimize hemodynamic  stability  - Monitor arterial and/or venous puncture sites for bleeding and/or hematoma  - Assess quality of pulses, skin color and temperature  - Assess for signs of decreased coronary artery perfusion - ex. Angina  - Evaluate fluid balance, assess for edema, trend weights  Outcome: Progressing  Goal: Absence of cardiac arrhythmias or at baseline  Description: INTERVENTIONS:  - Continuous cardiac monitoring, monitor vital signs, obtain 12 lead EKG if indicated  - Evaluate effectiveness of antiarrhythmic and heart rate control medications as ordered  - Initiate emergency measures for life threatening arrhythmias  - Monitor electrolytes and administer replacement therapy as ordered  Outcome: Progressing     Problem: METABOLIC/FLUID AND ELECTROLYTES - ADULT  Goal: Glucose maintained within prescribed range  Description: INTERVENTIONS:  - Monitor Blood Glucose as ordered  - Assess for signs and symptoms of hyperglycemia and hypoglycemia  - Administer ordered medications to maintain glucose within target range  - Assess barriers to adequate nutritional intake and initiate nutrition consult as needed  - Instruct patient on self management of diabetes  Outcome: Progressing  Goal: Electrolytes maintained within normal limits  Description: INTERVENTIONS:  - Monitor labs and rhythm and assess patient for signs and symptoms of electrolyte imbalances  - Administer electrolyte replacement as ordered  - Monitor response to electrolyte replacements, including rhythm and repeat lab results as appropriate  - Fluid restriction as ordered  - Instruct patient on fluid and nutrition restrictions as appropriate  Outcome: Progressing     Problem: NEUROLOGICAL - ADULT  Goal: Achieves stable or improved neurological status  Description: INTERVENTIONS  - Assess for and report changes in neurological status  - Initiate measures to prevent increased intracranial pressure  - Maintain blood pressure and fluid volume within ordered parameters to  optimize cerebral perfusion and minimize risk of hemorrhage  - Monitor temperature, glucose, and sodium. Initiate appropriate interventions as ordered  Outcome: Progressing     Problem: PAIN - ADULT  Goal: Verbalizes/displays adequate comfort level or patient's stated pain goal  Description: INTERVENTIONS:  - Encourage pt to monitor pain and request assistance  - Assess pain using appropriate pain scale  - Administer analgesics based on type and severity of pain and evaluate response  - Implement non-pharmacological measures as appropriate and evaluate response  - Consider cultural and social influences on pain and pain management  - Manage/alleviate anxiety  - Utilize distraction and/or relaxation techniques  - Monitor for opioid side effects  - Notify MD/LIP if interventions unsuccessful or patient reports new pain  - Anticipate increased pain with activity and pre-medicate as appropriate  Outcome: Progressing     Problem: SAFETY ADULT - FALL  Goal: Free from fall injury  Description: INTERVENTIONS:  - Assess pt frequently for physical needs  - Identify cognitive and physical deficits and behaviors that affect risk of falls.  - Lanse fall precautions as indicated by assessment.  - Educate pt/family on patient safety including physical limitations  - Instruct pt to call for assistance with activity based on assessment  - Modify environment to reduce risk of injury  - Provide assistive devices as appropriate  - Consider OT/PT consult to assist with strengthening/mobility  - Encourage toileting schedule  Outcome: Progressing     Problem: DISCHARGE PLANNING  Goal: Discharge to home or other facility with appropriate resources  Description: INTERVENTIONS:  - Identify barriers to discharge w/pt and caregiver  - Include patient/family/discharge partner in discharge planning  - Arrange for needed discharge resources and transportation as appropriate  - Identify discharge learning needs (meds, wound care, etc)  -  Arrange for interpreters to assist at discharge as needed  - Consider post-discharge preferences of patient/family/discharge partner  - Complete POLST form as appropriate  - Assess patient's ability to be responsible for managing their own health  - Refer to Case Management Department for coordinating discharge planning if the patient needs post-hospital services based on physician/LIP order or complex needs related to functional status, cognitive ability or social support system  Outcome: Progressing

## 2025-01-19 NOTE — PROGRESS NOTES
01/19/25 0928   VISIT TYPE   OT Inpatient Visit Type (Documentation Required) Attempted Evaluation     OT orders received. Chart reviewed. Activity orders in place. Per discussion with RN, patient is currently at CT. RN reporting she just called a Stroke Alert due to patients complaint of new right sided weakness. Will hold OT evaluation at this time. Plan to follow up later this date, and or tomorrow, schedule permitting.     Thank you.     Luz Maria Borden OTR/L  [facility]  PRN - Staff

## 2025-01-19 NOTE — CONSULTS
Merged with [facility]  [address] [place] [address], [address]  [facility] [address]  [phone]            Chester Bautista Patient Status:  Inpatient    1940 MRN [id number]   Location [facility] 3W/SW Attending Richard Guerra MD   Hosp Day # 1 PCP Heike Sorto MD     Date of Admission:  2025  Date of Consult:  2025  Reason for Consultation:   Acute stroke  Code stroke called    History of Present Illness:   Patient is a 84 year old female who was admitted to the hospital for Cerebellar stroke (HCC):    I have previously seen her on 2024, she has complicated past medical history including duodenal bleeding post cauterization and on octreotide injections.  She developed acute symptoms of vertigo prior to her visit to the emergency department and MRI of the brain revealed a small stroke in the left cerebellum.   CT angiogram of the head and neck revealed an extensive irregular thrombus of the aortic arch that extends in the proximal part of the left subclavian artery origin there was however only mild narrowing of the vertebral artery origin  Given her complicated history with duodenal AVMs and recurrent bleeding (patient with iron deficiency anemia requiring iron transfusions and occasional PRBC transfusions) she was only started on Plavix 75 mg daily.  Vascular surgery consulted.     This morning she was called acute stroke for symptoms of weakness in the right hemibody.  I evaluated this patient as part of acute stroke code this morning.  Upon my assessment patient is in severe pain mainly around her right forearm and wrist area and around the inguinal and right thigh area.  See exam below        Past Medical History  Past Medical History:    Anxiety state    Cancer (HCC)    breast cancer 2018    Cerebellar stroke (HCC)    Chronic obstructive pulmonary disease, unspecified (HCC)    Chronic obstructive pulmonary disease, unspecified (HCC)    COPD  (chronic obstructive pulmonary disease) (HCC)    Cystic thyroid nodule    Diabetes (HCC)    Diabetes mellitus (HCC)    Essential hypertension    Exposure to medical diagnostic radiation    High blood pressure    High cholesterol    History of blood transfusion    low hemoglobin    Osteoarthritis    PONV (postoperative nausea and vomiting)    Pulmonary embolism (HCC)    Pulmonary emphysema (HCC)    Rheumatoid arthritis (HCC)       Past Surgical History  Past Surgical History:   Procedure Laterality Date    Cataract extraction w/  intraocular lens implant Bilateral 2017    Dr in [facility]     Colonoscopy      Colonoscopy N/A 07/21/2022    Procedure: COLONOSCOPY;  Surgeon: Mikey Garcia MD;  Location: [facility] ENDOSCOPY    Colonoscopy N/A 06/15/2023    Procedure: COLONOSCOPY;  Surgeon: Mikey Garcia MD;  Location: [facility] ENDOSCOPY    Correct bunion,simple      right foot  1993    Egd  12/21/2023    Dr. Garcia; Duodenal AVM's x4 cauterized    Hysterectomy      1972, no abnormal Paps, due to heavy bleeding with fibroids.  Not sure if it had bilateral salpingo-oophorectomy.    Ir aneurysm repairm  2010    Computer assisted volumetric craniofomy with clipping of anterior cerebral artery.    Lumpectomy right      Radiation right      Rotator cuff repair      1993    Total knee replacement Right 2010    Total knee replacement Left 07/02/2015    Transoral incisionless fundoplication - internal  01/25/2012 Fredy fundoplication at [facility] Dr. Fournier.    Fredy fundoplication at [facility] Dr. Fournier.    Trigger finger release      left hand  2005    Yag capsulotomy - ou - both eyes Bilateral 09/2021    done in [place]       Family History  Family History   Problem Relation Age of Onset    Heart Surgery Mother         Leaky valve at 39 y/o.     Prostate Cancer Brother     Cancer Brother     Diabetes Maternal Grandmother     Diabetes Paternal Aunt     Breast Cancer Self 78    Breast Cancer Niece          before 50    Macular degeneration Neg     Glaucoma Neg        Social History  Pediatric History   Patient Parents    Not on file     Other Topics Concern    Not on file   Social History Narrative    Just moved in from [place].   passed away and that is why moved from [place] to be with daughter who lives in [place].  Currently lives with daughter.           Current Medications:  Current Facility-Administered Medications   Medication Dose Route Frequency    potassium chloride (Klor-Con M20) tab 40 mEq  40 mEq Oral Q4H    meclizine (Antivert) tab 12.5 mg  12.5 mg Oral TID PRN    sodium chloride 0.9% infusion   Intravenous Continuous    acetaminophen (Tylenol) tab 650 mg  650 mg Oral Q4H PRN    Or    acetaminophen (Tylenol) rectal suppository 650 mg  650 mg Rectal Q4H PRN    hydrALAzine (Apresoline) 20 mg/mL injection 10 mg  10 mg Intravenous Q2H PRN    ondansetron (Zofran) 4 MG/2ML injection 4 mg  4 mg Intravenous Q6H PRN    metoclopramide (Reglan) 5 mg/mL injection 5 mg  5 mg Intravenous Q8H PRN    acetaminophen (Tylenol) tab 650 mg  650 mg Oral Q6H PRN    ondansetron (Zofran) 4 MG/2ML injection 4 mg  4 mg Intravenous Q6H PRN    albuterol (Ventolin) (2.5 MG/3ML) 0.083% nebulizer solution 2.5 mg  2.5 mg Nebulization Q4H PRN    carvedilol (Coreg) tab 12.5 mg  12.5 mg Oral BID with meals    cyclobenzaprine (Flexeril) tab 10 mg  10 mg Oral Nightly PRN    famotidine (Pepcid) tab 20 mg  20 mg Oral BID    fluticasone-salmeterol (Advair Diskus) 250-50 MCG/ACT inhaler 1 puff  1 puff Inhalation 2 times per day    gabapentin (Neurontin) cap 100 mg  100 mg Oral Nightly    cetirizine (ZyrTEC) tab 10 mg  10 mg Oral Daily    montelukast (Singulair) tab 10 mg  10 mg Oral Nightly    predniSONE (Deltasone) tab 5 mg  5 mg Oral Daily    rosuvastatin (Crestor) tab 20 mg  20 mg Oral Nightly    losartan (Cozaar) tab 50 mg  50 mg Oral Daily    nitrofurantoin monohydrate macro (Macrobid) cap 100 mg  100 mg Oral BID with  meals    hydroxychloroquine (Plaquenil) tab 300 mg  300 mg Oral Daily    oxyCODONE immediate release tab 5 mg  5 mg Oral Q6H PRN    clopidogrel (Plavix) tab 75 mg  75 mg Oral Daily    insulin aspart (NovoLOG) 100 Units/mL FlexPen 1-11 Units  1-11 Units Subcutaneous TID CC    insulin degludec (Tresiba) 100 units/mL flextouch 10 Units  10 Units Subcutaneous Nightly     Medications Prior to Admission   Medication Sig    acetaminophen 325 MG Oral Tab Take 2 tablets (650 mg total) by mouth every 8 (eight) hours.    rosuvastatin 20 MG Oral Tab Take 1 tablet (20 mg total) by mouth nightly.    magnesium oxide 400 MG Oral Tab Take 1 tablet (400 mg total) by mouth in the morning, at noon, and at bedtime. Per Dr. JENSEN Mata    valsartan 80 MG Oral Tab 1  1/2 tabs every 12 hrs. (Patient taking differently: Take 1 tablet (80 mg total) by mouth 2 (two) times daily. 1  1/2 tabs every 12 hrs.)    loratadine 10 MG Oral Tab TAKE 1 TABLET BY MOUTH EVERY DAY    Potassium Chloride ER 20 MEQ Oral Tab CR Take 40 mEq by mouth every morning.    hydroxychloroquine 200 MG Oral Tab Take 1.5 tablets (300 mg total) by mouth daily.    predniSONE 5 MG Oral Tab Take 1 tablet (5 mg total) by mouth daily.    famotidine 20 MG Oral Tab Take 1 tablet (20 mg total) by mouth 2 (two) times daily. Patient gets over the counter    glipiZIDE 5 MG Oral Tab Take 1 tablet (5 mg total) by mouth every morning before breakfast.    albuterol 108 (90 Base) MCG/ACT Inhalation Aero Soln TAKE 2 PUFFS BY MOUTH EVERY 4 TO 6 HOURS AS NEEDED    cyclobenzaprine 10 MG Oral Tab Take 1 tablet (10 mg total) by mouth nightly as needed.    insulin glargine (LANTUS SOLOSTAR) 100 UNIT/ML Subcutaneous Solution Pen-injector Inject 10 Units into the skin daily with dinner. (Patient taking differently: Inject 14 Units into the skin daily with dinner.)    montelukast 10 MG Oral Tab Take 1 tablet (10 mg total) by mouth nightly.    carvedilol 12.5 MG Oral Tab Take 1 tablet (12.5 mg total) by  mouth 2 (two) times daily with meals.    fluticasone-salmeterol (WIXELA INHUB) 250-50 MCG/ACT Inhalation Aerosol Powder, Breath Activated Inhale 1 puff into the lungs every 12 (twelve) hours.    gabapentin 100 MG Oral Cap Take 1 capsule (100 mg total) by mouth nightly.    ciprofloxacin 500 MG Oral Tab Take 1 tablet (500 mg total) by mouth 2 (two) times daily for 7 days.    ipratropium 0.02 % Inhalation Solution Take 2.5 mL (500 mcg total) by nebulization 2 (two) times daily.    albuterol (2.5 MG/3ML) 0.083% Inhalation Nebu Soln Take 3 mL (2.5 mg total) by nebulization every 6 (six) hours as needed for Wheezing.    Cholecalciferol (VITAMIN D3) 1.25 MG (77094 UT) Oral Cap 1 po [id number].    [] doxycycline 100 MG Oral Cap Take 1 capsule (100 mg total) by mouth 2 (two) times daily for 7 days. (Patient not taking: Reported on 10/25/2024)    [] albuterol (2.5 MG/3ML) 0.083% Inhalation Nebu Soln Take 3 mL (2.5 mg total) by nebulization every 4 (four) hours as needed for Wheezing or Shortness of Breath.    BD PEN NEEDLE KADEN 2ND GEN 32G X 4 MM Does not apply Misc 1 EACH DAILY. USE DAILY WITH INSULIN    Blood Glucose Monitoring Suppl (BLOOD GLUCOSE SYSTEM GILLES) Does not apply Kit Dx. E11.9. Checks qam.    Blood Glucose Monitoring Suppl w/Device Does not apply Kit Dx. E11.9. Checks qam.    Blood Gluc Meter Disp-Strips Does not apply Device Dx. E11.9. Checks qam.    Accu-Chek FastClix Lancets Does not apply Misc Check sugar once daily as directed (E11.42)    Blood Glucose Monitoring Suppl (ONETOUCH VERIO) w/Device Does not apply Kit 1 each daily. Test 1x daily    OneTouch Delica Lancets 33G Does not apply Misc 4 x daily    acetaminophen 500 MG Oral Tab Take 1 tablet (500 mg total) by mouth daily.       Allergies  Allergies[1]    Review of Systems:   As in HPI, the rest of the 14 system review was done and was negative    Physical Exam:     Vitals:    25 0333 25 0412 25 0640 25 0903   BP:  (!)  175/68 (!) 170/74 149/62   Pulse: 94 95  106   Resp:  18  16   Temp:  98.2 °F (36.8 °C)  98.4 °F (36.9 °C)   TempSrc:  Oral  Oral   SpO2:  93% 95% 92%   Weight:  138 lb (62.6 kg)     Height:           Alert attentive and fully oriented, visual field is full to confrontation test and extraocular movements intact.  Pupils equal and reactive to light, face appears symmetric and no dysarthria of the speech, tongue protrudes in midline  Neck is supple and tone is normal in all extremities  She has no drift in right upper extremity, she has minimal drift in right lower extremity but also complains of significant pain around her right inguinal and thigh area.  Motor power was 5/5 in distal muscles of her right lower extremity  Was not ataxic on finger-nose-finger test  Sensory exam is symmetric    NIHSS 1 due to drift in right lower extremity    Results:     Laboratory Data:  Lab Results   Component Value Date    WBC 15.5 (H) 01/19/2025    HGB 8.2 (L) 01/19/2025    HCT 26.6 (L) 01/19/2025    .0 01/19/2025    CREATSERUM 0.89 01/19/2025    BUN 15 01/19/2025     01/19/2025    K 3.5 01/19/2025     01/19/2025    CO2 26.0 01/19/2025     (H) 01/19/2025    CA 9.7 01/19/2025    ALB 3.5 11/29/2024    ALKPHO 103 10/17/2024    TP 7.2 10/17/2024    AST 31 11/29/2024    ALT 14 11/29/2024    PTT 27.0 01/18/2025    INR 0.94 01/17/2025    PTP 13.1 01/17/2025    T4F 1.2 01/17/2025    TSH 0.418 (L) 01/17/2025    DDIMER 1.60 (H) 02/23/2024    ESRML >130 (H) 11/29/2024    CRP 3.00 (H) 11/29/2024    MG 1.3 (L) 01/16/2025    PHOS 3.9 06/07/2024    B12 814 11/01/2024         Imaging:    CT STROKE BRAIN (NO IV)(CPT=70450)    Result Date: 1/19/2025  CONCLUSION:  1. Small focus of hypoattenuation involving the left cerebellar hemisphere, which corresponds to the acute infarct demonstrated on the prior MRI brain dated 01/17/2025. 2. Otherwise, no transcortical area of hypoattenuation to suggest an acute infarct.  If there is  clinical concern for an acute infarct, consider further evaluation with an MRI of the brain. 3. No acute intracranial hemorrhage, midline shift, mass effect, or hydrocephalus. 4. Redemonstrated postoperative changes from prior frontal craniotomy for aneurysm clipping along the course of the anterior cerebral arteries.  The associated metallic artifact from the aneurysm clips limits evaluation of the adjacent structures. 5. Lesser incidental findings described above.    Impression points 1-3 were communicated via telephone to Leonidas SOW at 9:39 a.m. on 01/19/2025 by Joe Deleon MD  Dictated by (CST): Joe Deleon MD on 1/19/2025 at 9:34 AM     Finalized by (CST): Joe Deleon MD on 1/19/2025 at 9:41 AM          MRI BRAIN WO ACUTE (3) SEQUENCE (CPT=70551)    Result Date: 1/18/2025  CONCLUSION:   Limited 3-sequence stroke protocol study was performed. Within these parameters:  1. Acute to early subacute lacunar infarctions in the left cerebellar hemisphere. 2. Stable postoperative changes from a remote frontal craniotomy and anterior parafalcine aneurysm clipping. 3. Stable mild chronic microangiopathic ischemic changes.   Results of this examination were discussed with the patient's nurse, Deepak, by the on-call teleradiologist Dr. Smiley Trejo at 22:32 on 01/17/2025.  A preliminary report was issued by the [facility] teleradiology service. There are no major discrepancies.   Dictated by (CST): Abraham Mar MD on 1/18/2025 at 10:11 AM     Finalized by (CST): Abraham Mar MD on 1/18/2025 at 10:16 AM          US VENOUS DOPPLER LEG BILAT - DIAG IMG (CPT=93970)    Result Date: 1/18/2025  CONCLUSION:  No DVT in either lower extremity.   A preliminary report was issued by the [facility] teleradiology service. There are no major discrepancies.  Dictated by (CST): Abraham Mar MD on 1/18/2025 at 9:32 AM     Finalized by (CST): Abraham Mar MD on 1/18/2025 at 9:33 AM          CTA  CHEST+CTA ABDOMEN DISSECT SET (CPT=71275/43559)    Result Date: 1/17/2025  CONCLUSION:  1. Acute pulmonary emboli involving the bilateral lower lobe subsegmental pulmonary artery branches.  2. No evidence of aortic dissection.  3. Ascending thoracic aortic ectasia. Multifocal irregular partially ulcerated peripheral mural thrombus is observed throughout the thoracic and abdominal aorta.   4. Dilatation of the main pulmonary artery trunk may relate to underlying pulmonary hypertension.   5. Moderate to severe centrilobular/paraseptal emphysema with extensive postradiation fibrosis and chronic opacity of the right middle lobe.  6. Multifocal pleuroparenchymal scarring is apparent.  7. Proximal distal colonic diverticulosis without CT evidence of acute complication in the imaged volume.  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  9. Lesser incidental findings as above.    Results of this examination were discussed with the patient's physician, Dr. Cottrell, by Dr. Gruber at 2213 on 01/17/2025.   Dictated by (CST): Sharath Gruber MD on 1/17/2025 at 10:10 PM     Finalized by (CST): Sharath Gruber MD on 1/17/2025 at 10:22 PM          XR CHEST AP PORTABLE  (CPT=71045)    Result Date: 1/17/2025  CONCLUSION:  1. Unchanged chronic fibrotic changes throughout the lungs.  2. Cardiomegaly.    Dictated by (CST): Sharath Gruber MD on 1/17/2025 at 7:40 PM     Finalized by (CST): Sharath Gruber MD on 1/17/2025 at 7:42 PM          CT STROKE CTA BRAIN/CTA NECK (W IV)(CPT=70496/72055)    Result Date: 1/17/2025  CONCLUSION:  1. Negative for hemodynamically significant stenosis or occlusion of the anterior or posterior circulations.   2. The internal carotid arteries demonstrate tortuosity proximally without evidence of hemodynamically stenosis or occlusion.  3. The vertebral arteries demonstrate contiguous patency bilaterally without evidence of flow-limiting stenosis.  4. Extensive irregular thrombus of the aortic arch is  noted. Thrombus extends into the proximal left subclavian artery origin, contributing to luminal narrowing.  5. Dilatation of the main pulmonary artery trunk may relate to underlying pulmonary hypertension.  6. 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   7. Lesser incidental findings as above.   Dictated by (CST): Sharath Gruber MD on 1/17/2025 at 7:27 PM     Finalized by (CST): Sharath Gruber MD on 1/17/2025 at 7:36 PM          CT STROKE BRAIN (NO IV)(CPT=70450)    Result Date: 1/17/2025  CONCLUSION:  1. No acute intracranial hemorrhage or evidence of extended large vessel infarction. If there is clinical concern for acute ischemia, follow-up MRI suggested.   2. Postoperative changes of frontal craniotomy with anterior parafalcine aneurysm clipping.  3. Senescent changes of parenchymal volume loss with sequela of chronic microvascular ischemic disease.  4. There is large vessel atherosclerosis of the anterior and posterior intracranial circulations.  5. Lesser incidental findings as above.    This report was called immediately at 1915 hours to Emergency Department Pod 3 and discussed with the patient's ER physician, Dr. Cottrell.    Dictated by (CST): Sharath Gruber MD on 1/17/2025 at 7:11 PM     Finalized by (CST): Sharath Gruber MD on 1/17/2025 at 7:15 PM         EKG 12 Lead    Result Date: 1/18/2025  Normal sinus rhythm Minimal voltage criteria for LVH, may be normal variant ( R in aVL ) Inferior infarct , age undetermined Anterior infarct (cited on or before 22-OCT-2024) Abnormal ECG When compared with ECG of 22-OCT-2024 12:36, Inferior infarct is now Present Nonspecific T wave abnormality, improved in Inferior leads Confirmed by TERESSA FITZGERALD, JUSTIN ([id number]) on 1/18/2025 2:32:13 PM       Impression:   She is not a candidate for thrombolytic administration given her recent left cerebellar stroke as well as her complicated GI history with duodenal ulcers/AVM and recurrent bleeding-  Currently on iron infusions with occasional PRBCs transfusion  I reviewed CT scan of the head without contrast, obtained stat, there are no acute intracranial findings  Exam reveals mild drift in right lower extremity however she complains of significant pain in the right inguinal and thigh area  CT angiogram of the head and neck revealed an extensive irregular thrombus of the aortic arch that extends in the proximal part of the left subclavian artery origin there was however only mild narrowing of the vertebral artery origin. NO LVO    Recommendations:  1- No TNK  2-MRI brain  3-vascular surgery consulted  4-continue Plavix 75 mg daily  5- Increase rosuvastatin to 40mg daily    40 min critical care time        Thank you for allowing me to participate in the care of your patient.    We are following    Afia Zavala MD              [1]   Allergies  Allergen Reactions    Celebrex [Celecoxib] PALPITATIONS    Penicillins ITCHING and SWELLING    Amlodipine OTHER (SEE COMMENTS)     Calves Swelling     Metformin And Related UNKNOWN    Olmesartan UNKNOWN

## 2025-01-19 NOTE — SPIRITUAL CARE NOTE
Spiritual Care Visit Note    Patient Name: Chester Bautista Date of Spiritual Care Visit: 25   : 1940 Primary Dx: Cerebellar stroke (HCC)       Referred By: Referral From: Other (Comment) (IDT)    Spiritual Care Taxonomy:    Intended Effects: Convey a calming presence    Methods: Collaborate with care team member;Offer spiritual/Latter day support;Offer emotional support    Interventions: Acknowledge current situation;Acknowledge response to difficult experience;Active listening;Ask guided questions;[place] for care team member(s);Discuss concerns;Prayer for healing;Identify supportive relationship(s);Explain  role    Visit Type/Summary:     - Spiritual Care: Responded to a request via the on call phone Offered empathic listening and emotional support. Patient and family expressed appreciation for  visit. Provided information regarding how to contact Spiritual Care and left a Spiritual Care information card. Walked patient down to CT and prayed with her. Her daughter Klarissa is coming to see her today.  remains available as needed for follow up.    Spiritual Care support can be requested via an Epic consult. For urgent/immediate needs, please contact the On Call  at: [place]: ext [phone]    Chaplain Resident, Maria Dolores Infante PhD

## 2025-01-19 NOTE — SIGNIFICANT EVENT
RRT    *See RRT Documentation Record*    Reason the RRT was called: New right arm and right leg weakness    Assessment of patient leading up to RRT: A&O x 4, denies numbness and tingling at the time, RUE + RLE drift      Interventions/Testing: STAT CT, MRI    Patient Outcome/Disposition: Per MD Ronald March pt stable, pt to remain on 3WB    Thao NEVAREZ RN

## 2025-01-19 NOTE — CONSULTS
[facility]  part of [facility]    Vascular Surgery Consultation    Chester Bautista Patient Status:  Inpatient    1940 MRN [id number]   Location [facility] 3W/SW Attending Richard Guerra MD   Hosp Day # 0 PCP Heike Sorto MD     Date of Admission:  2025  Date of Consult: 2025    “I was requested by Dr. Guerra to provide a consultation for Chester Bautista  Reason for Consultation:   Left cerebral infarct, left vertebral artery origin stenosis in thrombus.    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  Patient explains that she was in her usual state of health when acutely she started to have severe vertigo.  She subsequently developed right upper extremity numbness and weakness, however by the time of the arrival to the emergency room all of her symptoms were almost completely resolved.  MRI of the brain performed in the emergency room was concerning for small left cerebellar stroke.  CT angio of the head and neck was notable for bilateral antegrade vertebral artery flow however there was mild to moderate stenosis at the origin of the left vertebral artery and there was a thrombus in the aortic arch encroaching to the origin of the left subclavian artery.  Vascular surgery was consulted for further evaluation and recommendations.    Past Medical History  Past Medical History:    Anxiety state    Cancer (HCC)    breast cancer 2018    Cerebellar stroke (HCC)    Chronic obstructive pulmonary disease, unspecified (HCC)    Chronic obstructive pulmonary disease, unspecified (HCC)    COPD (chronic obstructive pulmonary disease) (HCC)    Cystic thyroid nodule    Diabetes (HCC)    Diabetes mellitus (HCC)    Essential hypertension    Exposure to medical diagnostic radiation     High blood pressure    High cholesterol    History of blood transfusion    low hemoglobin    Osteoarthritis    PONV (postoperative nausea and vomiting)    Pulmonary embolism (HCC)    Pulmonary emphysema (HCC)    Rheumatoid arthritis (HCC)       Past Surgical History  Past Surgical History:   Procedure Laterality Date    Cataract extraction w/  intraocular lens implant Bilateral 2017    Dr in [facility]     Colonoscopy      Colonoscopy N/A 07/21/2022    Procedure: COLONOSCOPY;  Surgeon: Mikey Garcia MD;  Location: [facility] ENDOSCOPY    Colonoscopy N/A 06/15/2023    Procedure: COLONOSCOPY;  Surgeon: Mikey Garcia MD;  Location: [facility] ENDOSCOPY    Correct bunion,simple      right foot  1993    Egd  12/21/2023    Dr. Garcia; Duodenal AVM's x4 cauterized    Hysterectomy      1972, no abnormal Paps, due to heavy bleeding with fibroids.  Not sure if it had bilateral salpingo-oophorectomy.    Ir aneurysm repairm  2010    Computer assisted volumetric craniofomy with clipping of anterior cerebral artery.    Lumpectomy right      Radiation right      Rotator cuff repair      1993    Total knee replacement Right 2010    Total knee replacement Left 07/02/2015    Transoral incisionless fundoplication - internal  01/25/2012 Fredy fundoplication at [facility] Dr. Fournier.    Fredy fundoplication at [facility] Dr. Fournier.    Trigger finger release      left hand  2005    Yag capsulotomy - ou - both eyes Bilateral 09/2021    done in [place]       Family History  Family History   Problem Relation Age of Onset    Heart Surgery Mother         Leaky valve at 41 y/o.     Prostate Cancer Brother     Cancer Brother     Diabetes Maternal Grandmother     Diabetes Paternal Aunt     Breast Cancer Self 78    Breast Cancer Niece         before 50    Macular degeneration Neg     Glaucoma Neg        Social History  Social History     Socioeconomic History    Marital status:    Tobacco Use    Smoking status: Former      Current packs/day: 0.00     Types: Cigarettes     Quit date:      Years since quittin.0     Passive exposure: Never    Smokeless tobacco: Never    Tobacco comments:     [id number]   Vaping Use    Vaping status: Never Used   Substance and Sexual Activity    Alcohol use: Never    Drug use: Never   Social History Narrative    Just moved in from [place].   passed away and that is why moved from [place] to be with daughter who lives in [place].  Currently lives with daughter.     Social Drivers of Health     Financial Resource Strain: Low Risk  (10/22/2024)    Financial Resource Strain     Difficulty of Paying Living Expenses: Not very hard     Med Affordability: No   Food Insecurity: No Food Insecurity (2025)    Food Insecurity     Food Insecurity: Never true   Transportation Needs: No Transportation Needs (2025)    Transportation Needs     Lack of Transportation: No   Housing Stability: Low Risk  (2025)    Housing Stability     Housing Instability: No        Current Medications:  Current Facility-Administered Medications   Medication Dose Route Frequency    meclizine (Antivert) tab 12.5 mg  12.5 mg Oral TID PRN    sodium chloride 0.9% infusion   Intravenous Continuous    acetaminophen (Tylenol) tab 650 mg  650 mg Oral Q4H PRN    Or    acetaminophen (Tylenol) rectal suppository 650 mg  650 mg Rectal Q4H PRN    hydrALAzine (Apresoline) 20 mg/mL injection 10 mg  10 mg Intravenous Q2H PRN    ondansetron (Zofran) 4 MG/2ML injection 4 mg  4 mg Intravenous Q6H PRN    metoclopramide (Reglan) 5 mg/mL injection 5 mg  5 mg Intravenous Q8H PRN    acetaminophen (Tylenol) tab 650 mg  650 mg Oral Q6H PRN    ondansetron (Zofran) 4 MG/2ML injection 4 mg  4 mg Intravenous Q6H PRN    albuterol (Ventolin) (2.5 MG/3ML) 0.083% nebulizer solution 2.5 mg  2.5 mg Nebulization Q4H PRN    carvedilol (Coreg) tab 12.5 mg  12.5 mg Oral BID with meals    cyclobenzaprine (Flexeril) tab 10 mg  10 mg Oral  Nightly PRN    famotidine (Pepcid) tab 20 mg  20 mg Oral BID    fluticasone-salmeterol (Advair Diskus) 250-50 MCG/ACT inhaler 1 puff  1 puff Inhalation 2 times per day    gabapentin (Neurontin) cap 100 mg  100 mg Oral Nightly    cetirizine (ZyrTEC) tab 10 mg  10 mg Oral Daily    montelukast (Singulair) tab 10 mg  10 mg Oral Nightly    predniSONE (Deltasone) tab 5 mg  5 mg Oral Daily    rosuvastatin (Crestor) tab 20 mg  20 mg Oral Nightly    losartan (Cozaar) tab 50 mg  50 mg Oral Daily    nitrofurantoin monohydrate macro (Macrobid) cap 100 mg  100 mg Oral BID with meals    hydroxychloroquine (Plaquenil) tab 300 mg  300 mg Oral Daily    oxyCODONE immediate release tab 5 mg  5 mg Oral Q6H PRN    clopidogrel (Plavix) tab 75 mg  75 mg Oral Daily    insulin aspart (NovoLOG) 100 Units/mL FlexPen 1-11 Units  1-11 Units Subcutaneous TID CC    insulin degludec (Tresiba) 100 units/mL flextouch 10 Units  10 Units Subcutaneous Nightly     Medications Prior to Admission   Medication Sig    acetaminophen 325 MG Oral Tab Take 2 tablets (650 mg total) by mouth every 8 (eight) hours.    rosuvastatin 20 MG Oral Tab Take 1 tablet (20 mg total) by mouth nightly.    magnesium oxide 400 MG Oral Tab Take 1 tablet (400 mg total) by mouth in the morning, at noon, and at bedtime. Per Dr. JENSEN Mata    valsartan 80 MG Oral Tab 1  1/2 tabs every 12 hrs. (Patient taking differently: Take 1 tablet (80 mg total) by mouth 2 (two) times daily. 1  1/2 tabs every 12 hrs.)    loratadine 10 MG Oral Tab TAKE 1 TABLET BY MOUTH EVERY DAY    Potassium Chloride ER 20 MEQ Oral Tab CR Take 40 mEq by mouth every morning.    hydroxychloroquine 200 MG Oral Tab Take 1.5 tablets (300 mg total) by mouth daily.    predniSONE 5 MG Oral Tab Take 1 tablet (5 mg total) by mouth daily.    famotidine 20 MG Oral Tab Take 1 tablet (20 mg total) by mouth 2 (two) times daily. Patient gets over the counter    glipiZIDE 5 MG Oral Tab Take 1 tablet (5 mg total) by mouth every  morning before breakfast.    albuterol 108 (90 Base) MCG/ACT Inhalation Aero Soln TAKE 2 PUFFS BY MOUTH EVERY 4 TO 6 HOURS AS NEEDED    cyclobenzaprine 10 MG Oral Tab Take 1 tablet (10 mg total) by mouth nightly as needed.    insulin glargine (LANTUS SOLOSTAR) 100 UNIT/ML Subcutaneous Solution Pen-injector Inject 10 Units into the skin daily with dinner. (Patient taking differently: Inject 14 Units into the skin daily with dinner.)    montelukast 10 MG Oral Tab Take 1 tablet (10 mg total) by mouth nightly.    carvedilol 12.5 MG Oral Tab Take 1 tablet (12.5 mg total) by mouth 2 (two) times daily with meals.    fluticasone-salmeterol (WIXELA INHUB) 250-50 MCG/ACT Inhalation Aerosol Powder, Breath Activated Inhale 1 puff into the lungs every 12 (twelve) hours.    gabapentin 100 MG Oral Cap Take 1 capsule (100 mg total) by mouth nightly.    ciprofloxacin 500 MG Oral Tab Take 1 tablet (500 mg total) by mouth 2 (two) times daily for 7 days.    ipratropium 0.02 % Inhalation Solution Take 2.5 mL (500 mcg total) by nebulization 2 (two) times daily.    albuterol (2.5 MG/3ML) 0.083% Inhalation Nebu Soln Take 3 mL (2.5 mg total) by nebulization every 6 (six) hours as needed for Wheezing.    Cholecalciferol (VITAMIN D3) 1.25 MG (87373 UT) Oral Cap 1 po [id number].    [] doxycycline 100 MG Oral Cap Take 1 capsule (100 mg total) by mouth 2 (two) times daily for 7 days. (Patient not taking: Reported on 10/25/2024)    [] albuterol (2.5 MG/3ML) 0.083% Inhalation Nebu Soln Take 3 mL (2.5 mg total) by nebulization every 4 (four) hours as needed for Wheezing or Shortness of Breath.    BD PEN NEEDLE KADEN 2ND GEN 32G X 4 MM Does not apply Misc 1 EACH DAILY. USE DAILY WITH INSULIN    Blood Glucose Monitoring Suppl (BLOOD GLUCOSE SYSTEM GILLES) Does not apply Kit Dx. E11.9. Checks qam.    Blood Glucose Monitoring Suppl w/Device Does not apply Kit Dx. E11.9. Checks qam.    Blood Gluc Meter Disp-Strips Does not apply Device Dx. E11.9.  Checks qam.    Accu-Chek FastClix Lancets Does not apply Misc Check sugar once daily as directed (E11.42)    Blood Glucose Monitoring Suppl (ONETOUCH VERIO) w/Device Does not apply Kit 1 each daily. Test 1x daily    OneTouch Delica Lancets 33G Does not apply Misc 4 x daily    acetaminophen 500 MG Oral Tab Take 1 tablet (500 mg total) by mouth daily.       Allergies  Allergies[1]    Review of Systems:   Constitutional: No fevers, chills, fatigue or night sweats.  ENT: No neck pain, running nose, headaches.  Skin: No rashes, pruritus or skin changes,  Respiratory: No coughing, phlegm or wheezing.  CV: Denies chest pain, palpitations, orthopnea, or dizziness.  Musculoskeletal: Resolved right upper extremity weakness and sensory change  GI: No nausea, vomiting.  No diarrhea.  Denies any abdominal pain or unintentional weight loss.  No food fear.  No postprandial abdominal pain.   Neurologic: Resolved right upper extremity weakness and sensory change, resolved vertigo    Physical Exam:   Blood pressure 153/70, pulse 96, temperature 98 °F (36.7 °C), temperature source Oral, resp. rate 22, height 5' 1\" (1.549 m), weight 136 lb 12.8 oz (62.1 kg), SpO2 94%.  Intake/Output:   Last 3 shifts: I/O last 3 completed shifts:  In: 417.5 [P.O.:180; I.V.:137.5; IV PIGGYBACK:100]  Out: 500 [Urine:500]   This shift: No intake/output data recorded.     Vent Settings:      General: NAD  Neck: No JVD  Lungs: CTA bilat  Heart: RRR  Abdomen: Soft, no tenderness or sign of peritonitis.  No rigidity or self guarding.    Extremities: Warm, dry, no LE edema bilat  Vascular: 2+ B Femoral pulses.  Bilateral weak DP pulses.  Skin: no rashes or legions noted  Neurological:  AAOx3, MAEW    Hemodynamic parameters (last 24 hours):      Medications:     Scheduled Meds:    carvedilol  12.5 mg Oral BID with meals    famotidine  20 mg Oral BID    fluticasone-salmeterol  1 puff Inhalation 2 times per day    gabapentin  100 mg Oral Nightly    cetirizine  10  mg Oral Daily    montelukast  10 mg Oral Nightly    predniSONE  5 mg Oral Daily    rosuvastatin  20 mg Oral Nightly    losartan  50 mg Oral Daily    nitrofurantoin monohydrate macro  100 mg Oral BID with meals    hydroxychloroquine  300 mg Oral Daily    clopidogrel  75 mg Oral Daily    insulin aspart  1-11 Units Subcutaneous TID CC    insulin degludec  10 Units Subcutaneous Nightly       Continuous Infusions:    sodium chloride 75 mL/hr at 01/18/25 0410         Results:     Laboratory Data:  Lab Results   Component Value Date    WBC 9.1 01/18/2025    HGB 8.2 (L) 01/18/2025    HCT 26.7 (L) 01/18/2025    .0 01/18/2025    CREATSERUM 0.87 01/18/2025    BUN 15 01/18/2025     01/18/2025    K 3.9 01/18/2025     01/18/2025    CO2 27.0 01/18/2025     (H) 01/18/2025    CA 9.0 01/18/2025    ALB 3.5 11/29/2024    ALKPHO 103 10/17/2024    TP 7.2 10/17/2024    AST 31 11/29/2024    ALT 14 11/29/2024    PTT 27.0 01/18/2025    INR 0.94 01/17/2025    PTP 13.1 01/17/2025    T4F 1.2 01/17/2025    TSH 0.418 (L) 01/17/2025    DDIMER 1.60 (H) 02/23/2024    ESRML >130 (H) 11/29/2024    CRP 3.00 (H) 11/29/2024    MG 1.3 (L) 01/16/2025    PHOS 3.9 06/07/2024    TROPHS 11 01/17/2025    B12 814 11/01/2024         Imaging:  MRI BRAIN WO ACUTE (3) SEQUENCE (CPT=70551)  Narrative: PROCEDURE: MRI BRAIN WO ACUTE (3) SEQUENCE(CPT=70551)     COMPARISON: [facility], CT STROKE BRAIN (NO IV) (CPT=70450), 1/17/2025, 7:00 PM.  [facility], CT BRAIN OR HEAD (CPT=70450), 3/03/2024, 12:06 PM.  [facility], CT STROKE CTA BRAIN/CTA NECK (WIV)   (CPT=70496/97818), 1/17/2025, 7:00 PM.     INDICATIONS: Dizziness.  History of a right frontal craniotomy and anterior parafalcine aneurysm clipping.     TECHNIQUE: A limited ER 3-sequence stroke protocol study was conducted with diffusion weighted imaging, T2-weighted FLAIR, and gradient recalled echo images performed in the axial plane.  Images  were performed without contrast.       FINDINGS:   CSF SPACES: There is gradient susceptibility along the anterior aspect of the falx cerebri that limits assessment of the surrounding structures and is consistent with the known underlying parafalcine aneurysm clip.   The ventricles, cisterns, and sulci are commensurate in caliber and appropriate for age. No hydrocephalus, subarachnoid hemorrhage, or effacement of the basal cisterns is appreciated. There is no extra-axial fluid collection.   CEREBRUM: Occasional T2/FLAIR hyperintense foci are noted in the periventricular subcortical white matter of both cerebral hemispheres.  Otherwise no focal signal abnormality of the gray or white matter is perceived. No intraparenchymal hemorrhage,   edema, or cortical sulcal effacement is apparent. There is no space-occupying lesion, mass effect, or shift of midline structures. There is no diffusion restriction to suggest acute or subacute ischemia.  CEREBELLUM: There are 2 foci of restricted diffusion in the left cerebellar hemisphere measuring up to 6 mm with associated T2/FLAIR hyperintensity.  BRAINSTEM: The craniocervical junction is uncompromised.    CALVARIUM: Postoperative changes are again noted from a right frontal craniotomy.  There is otherwise no apparent fracture, mass, or other significant visible lesion.    SINUSES: Limited views demonstrate no significant mucosal thickening or fluid.    ORBITS: Changes of prior cataract surgery are noted bilaterally.     OTHER: The flow voids of the major intracranial vessels are visualized.              Impression: CONCLUSION:      Limited 3-sequence stroke protocol study was performed. Within these parameters:     1. Acute to early subacute lacunar infarctions in the left cerebellar hemisphere.  2. Stable postoperative changes from a remote frontal craniotomy and anterior parafalcine aneurysm clipping.  3. Stable mild chronic microangiopathic ischemic changes.        Results  of this examination were discussed with the patient's nurse, Deepak, by the on-call teleradiologist Dr. Smiley Trejo at 22:32 on 2025.      A preliminary report was issued by the [facility] Radiology teleradiology service. There are no major discrepancies.       Dictated by (CST): Abraham Mar MD on 2025 at 10:11 AM       Finalized by (CST): Abraham Mar MD on 2025 at 10:16 AM          US VENOUS DOPPLER LEG BILAT - DIAG IMG (CPT=93970)  Narrative: PROCEDURE: US VENOUS DOPPLER LEG BILAT-DIAG IMG (CPT=93970)     COMPARISON: [facility], CTA CHEST+CTA ABDOMEN DISSECT SET (CPT=71275/63109), 2025, 9:23 PM.  [facility], US VENOUS DOPPLER LEG BILAT-DIAG IMG (CPT=93970), 2024, 7:44 PM.     INDICATIONS: Recently diagnosed pulmonary emboli.  Assess for underlying DVT.     TECHNIQUE: Color duplex Doppler venous ultrasound of both lower extremities was performed in the   usual manner.     FINDINGS:   The femoral and popliteal veins appear normal.  Normal flow was demonstrated with color and pulsed Doppler.  Visualized portions of the great and small saphenous, posterior tibial and peroneal veins appear normal.       THROMBI: None visible.   COMPRESSIBILITY:   Normal.   OTHER: Negative.               Impression: CONCLUSION:   No DVT in either lower extremity.        A preliminary report was issued by the [facility] Radiology teleradiology service. There are no major discrepancies.     Dictated by (CST): Abraham Mar MD on 2025 at 9:32 AM       Finalized by (CST): Abraham Mar MD on 2025 at 9:33 AM          CARD ECHO 2D DOPPLER CONTRAST (CPT=93306)  Transthoracic Echocardiogram    Name:Chester Bautista    Date: 2025 :  1940 Ht:  (61in)  BP: 178 / 84  MRN:  [id number]    Age:  84years    Wt:  (137lb) HR:  Loc:  [facility]       Gndr: F          BSA: 1.61m^2  Sonographer: Nina MELENDREZ    Ordering:    Lorena Walton  Consulting:  Panchito  Kai    ----------------------------------------------------------------------------  History/Indications:   Cerebrovascular accident.  Pulmonary emboli.    ----------------------------------------------------------------------------  Procedure information:  A transthoracic complete 2D study was performed.  Additional evaluation included M-mode, complete spectral Doppler, and color  Doppler.  Patient status:  Inpatient.  Location:  Bedside.    Comparison was  made to the study of 02/03/2024.    This was a STAT study. Transthoracic  echocardiography for diagnosis and ventricular function evaluation. Image  quality was adequate. Intravenous contrast (Definity) was administered to  opacify the LV, LA, and RA.    ECG rhythm:   Normal sinus    ----------------------------------------------------------------------------    Conclusions:    1. Left ventricle: The cavity size was mildly reduced. Wall thickness was     normal. Systolic function was normal. The estimated ejection fraction was     55%, by visual assessment. No diagnostic evidence for regional wall     motion abnormalities. Features are consistent with a pseudonormal left     ventricular filling pattern, with concomitant abnormal relaxation and     increased filling pressure - grade 2 diastolic dysfunction.  2. Left ventricle: There is mild hypokinesis of the mid anterolateral wall.  3. Right ventricle: The cavity size was normal. Systolic function was     normal.  4. Ventricular septum: Thickness was moderately increased.  5. Left atrium: The atrium was markedly dilated.  6. Aortic valve: There was mild to moderate regurgitation.  7. Mitral valve: There was mild regurgitation.  8. Pulmonic valve: There was moderate regurgitation.  9. Pulmonary arteries: Systolic pressure was mildly increased.  Impressions:  No significant change since prior study.  *    ----------------------------------------------------------------------------  *  Findings:  Left ventricle:   The cavity size was mildly reduced. Wall thickness was  normal. Systolic function was normal. The estimated ejection fraction was  55%, by visual assessment. No diagnostic evidence for diffuse regional wall  motion abnormalities. No diagnostic evidence for regional wall motion  abnormalities.  Regional wall motion:   There is mild hypokinesis of the mid anterolateral  wall.   Features are consistent with a pseudonormal left ventricular filling  pattern, with concomitant abnormal relaxation and increased filling pressure  - grade 2 diastolic dysfunction.  Ventricular septum:   Thickness was moderately increased.  Left atrium:  The atrium was markedly dilated.  Right ventricle:  The cavity size was normal. Systolic function was normal.  Right atrium:  The atrium was normal in size.  Mitral valve:  The valve was structurally normal. Leaflet separation was  normal.  Doppler:  Transvalvular velocity was within the normal range. There  was no evidence for stenosis. There was mild regurgitation.  Aortic valve:  The valve was structurally normal. The valve was trileaflet.  The leaflets were normal thickness. Cusp separation was normal.  Doppler:  Transvalvular velocity was within the normal range. There was no evidence  for stenosis. There was mild to moderate regurgitation.  Tricuspid valve:  The valve is structurally normal. Leaflet separation was  normal.  Doppler:  Transvalvular velocity was within the normal range. There  was no evidence for stenosis. There was mild regurgitation.  Pulmonic valve:   The valve is structurally normal. Cusp separation was  normal.  Doppler:  Transvalvular velocity was within the normal range. There  was no evidence for stenosis. There was moderate regurgitation. The peak  systolic gradient was 17mm Hg.  Pericardium:   There was no pericardial effusion.  Aorta:  Aortic root: The aortic root was normal-sized.  Ascending aorta: The ascending aorta was normal.  Pulmonary arteries:  The main  pulmonary artery was normal-sized. Systolic pressure was mildly  increased.  Systemic veins:  Inferior vena cava: The IVC was normally collapsible and normal-sized.    ----------------------------------------------------------------------------  Measurements     Left ventricle         Value        Ref       02/03/2024   IVS thickness, ED, (H) 1.3   cm     0.6 - 0.9 1.1   PLAX   LV ID, ED, PLAX    (L) 3.6   cm     3.8 - 5.2 3.7   LV ID, ES, PLAX        2.7   cm     2.2 - 3.5 2.7   LV PW thickness,       0.9   cm     0.6 - 0.9 0.9   ED, PLAX   IVS/LV PW ratio,       1.44         --------- 1.22   ED, PLAX   LV PW/LV ID ratio,     0.25         --------- 0.24   ED, PLAX   LV ejection        (L) 50    %      54 - 74   54   fraction   Stroke volume/bsa,     27    ml/m^2 --------- 34   2D   LV end-diastolic       64    ml     48 - 140  ----------   volume, 1-p A4C   LV ejection            52    %      46 - 78   ----------   fraction, 1-p A4C   Stroke volume, 1-p     33    ml     --------- ----------   A4C   LV end-diastolic       40    ml/m^2 30 - 82   ----------   volume/bsa, 1-p   A4C   Stroke volume/bsa,     21    ml/m^2 --------- ----------   1-p A4C   LV end-diastolic       63    ml     46 - 106  ----------   volume, 2-p   LV end-systolic        34    ml     14 - 42   ----------   volume, 2-p   LV ejection        (L) 46    %      54 - 74   ----------   fraction, 2-p   Stroke volume, 2-p     29    ml     --------- ----------   LV end-diastolic       39    ml/m^2 29 - 61   ----------   volume/bsa, 2-p   LV end-systolic        21    ml/m^2 8 - 24    ----------   volume/bsa, 2-p   Stroke volume/bsa,     18    ml/m^2 --------- ----------   2-p   LV e', lateral     (L) 5.9   cm/sec >=10.0    6.5   LV E/e', lateral   (H) 14           <=13      19   LV e', medial      (L) 2.8   cm/sec >=7.0     6.4   LV E/e', medial        29           --------- 20   LV e', average         4.4   cm/sec --------- 6.5   LV E/e', average   (H) 19            <=14      19     LVOT                   Value        Ref       02/03/2024   LVOT ID                1.8   cm     --------- 1.9   LVOT peak              0.84  m/sec  --------- 1   velocity, S   LVOT VTI, S            19.5  cm     --------- 19.2   LVOT peak              3     mm Hg  --------- 4   gradient, S   LVOT mean              2     mm Hg  --------- 2   gradient, S   Stroke volume          50    ml     --------- 54   (SV), LVOT DP   Stroke index           31    ml/m^2 --------- 34   (SV/bsa), LVOT DP     Aortic valve           Value        Ref       02/03/2024   Aortic pressure        360   ms     --------- ----------   half-time   Aortic regurg          4.66  m/sec  --------- ----------   velocity, ED   Aortic regurg          496   cm/s^2 --------- ----------   deceleration   Aortic regurg          275   ms     --------- ----------   pressure half-time   Aortic regurg          87    mm Hg  --------- ----------   gradient, ED     Aortic root            Value        Ref       02/03/2024   Aortic root ID         3.0   cm     2.4 - 3.8 3.4   Aortic root ID,        3.2   cm     2.0 - 3.2 ----------   STJ, ED   Aortic root ID,        2.9   cm     --------- ----------   ED, MM     Ascending aorta        Value        Ref       02/03/2024   Ascending aorta ID (H) 3.6   cm     1.9 - 3.5 3.3     Left atrium            Value        Ref       02/03/2024   LA ID, A-P, ES     (H) 4.8   cm     2.7 - 3.8 ----------   LA volume, S       (H) 84    ml     22 - 52   52   LA volume/bsa, S   (H) 52    ml/m^2 16 - 34   32   LA volume, ES, 1-p (H) 86    ml     22 - 52   53   A4C   LA volume, ES, 1-p (H) 80    ml     22 - 52   49   A2C   LA volume, ES, A/L     88    ml     --------- 55   LA volume/bsa, ES, (H) 55    ml/m^2 16 - 34   34   A/L   LA/aortic root         1.6          --------- ----------   ratio     Mitral valve           Value        Ref       02/03/2024   Mitral E-wave peak     0.83  m/sec  --------- 1.26    velocity   Mitral A-wave peak     1.54  m/sec  --------- 1.43   velocity   Mitral                 225   ms     --------- 236   deceleration time   Mitral peak            3     mm Hg  --------- 6   gradient, D   Mitral E/A ratio,      0.5          --------- 0.9   peak   Mitral regurg peak     488   cm/sec --------- ----------   velocity   Mitral peak LV-LA      95.26 mm Hg  --------- ----------   gradient, S   Mitral maximal         486   cm/sec --------- ----------   regurg velocity,   PISA     Pulmonary artery       Value        Ref       02/03/2024   PA pressure, S, DP     25    mm Hg  --------- ----------   PA pressure, ED,       14    mm Hg  --------- ----------   DP     Tricuspid valve        Value        Ref       02/03/2024   Tricuspid regurg   (H) 3.14  m/sec  <=2.8     2.71   peak velocity   Tricuspid peak         39    mm Hg  --------- 29   RV-RA gradient     Systemic veins         Value        Ref       02/03/2024   Estimated CVP          3     mm Hg  --------- 3     Inferior vena cava     Value        Ref       02/03/2024   ID                     1.6   cm     <=2.1     1.4     Right ventricle        Value        Ref       02/03/2024   TAPSE, MM          (L) 1.07  cm     >=1.70    1.41   RV pressure, S, DP     42    mm Hg  --------- 32   RV s', lateral         11.1  cm/sec >=9.5     16.6     Pulmonic valve         Value        Ref       02/03/2024   Pulmonic valve         2.1   m/sec  --------- ----------   peak velocity, S   Pulmonic peak          17    mm Hg  --------- ----------   gradient, S   Pulmonic regurg        2.11  m/sec  --------- ----------   peak velocity   Pulmonic regurg        18    mm Hg  --------- ----------   peak gradient   Pulmonic regurg        1.68  m/sec  --------- ----------   velocity, ED   Pulmonic regurg        11    mm Hg  --------- ----------   gradient, ED  Legend:  (L)  and  (H)  lex values outside specified reference  range.    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  MRI of the brain performed in the emergency room was concerning for small left cerebellar stroke.  CT angio of the head and neck was notable for bilateral antegrade vertebral artery flow however there was mild to moderate stenosis at the origin of the left vertebral artery and there was a thrombus in the aortic arch encroaching to the origin of the left subclavian artery.  Vascular surgery was consulted for further evaluation and recommendations.    Recommendations:  -Agree with initiation of the Plavix considering patient has symptomatic vertebral artery disease.  - Considering the patient has gastric AVM, I will discuss her situation further with Dr. Gomez from neurology to better delineate the risks of recurrent disease.  - Any surgical intervention after my discussion with Dr. Kc.  -Please call with questions or concerns.    Thank you for allowing me to participate in the care of your patient.    Lisandra Long MD  1/18/2025  [facility], Division of Vascular Surgery    Please note: Dragon speech recognition software was used to prepare this note. If a word or phrase is confusing, it is likely do to a failure of recognition.   Please contact me with any questions or clarifications.       [1]   Allergies  Allergen Reactions    Celebrex [Celecoxib] PALPITATIONS    Penicillins ITCHING and SWELLING    Amlodipine OTHER (SEE COMMENTS)     Calves Swelling      Metformin And Related UNKNOWN    Olmesartan UNKNOWN

## 2025-01-19 NOTE — PROGRESS NOTES
[facility]  part of [facility]    Progress Note    Chester Bautista Patient Status:  Inpatient    1940 MRN [id number]   Location [facility] 3W/SW Attending Richard Guerra MD   Hosp Day # 1 PCP Heike Sorto MD       Subjective:   Chester Bautista is a(n) 84 year old female was seen and examined  This AM stroke alert was called for increased R sided weakness  Subsequent CT unchanged  Currently resting in bed, comfortably   No cp, sob, f,c,n,v, abd pain or HA     Objective:   Blood pressure 149/62, pulse 106, temperature 98.4 °F (36.9 °C), temperature source Oral, resp. rate 16, height 5' 1\" (1.549 m), weight 138 lb (62.6 kg), SpO2 92%.    General: AAF, NAD  HEENT: Normocephalic atraumatic. Moist mucous membranes.   Respiratory: Clear to auscultation bilaterally. No wheezes. No rhonchi.  Cardiovascular: RRR  Abdomen: Soft, nontender, nondistended.  Neurologic: No focal neurological deficits.   Musculoskeletal: Moves all extremities.  Extremities: No edema or cyanosis.  Integument: No rashes or lesions.   Psychiatric: Appropriate mood and affect.    Current Inpatient Medications:     Current Facility-Administered Medications:     potassium chloride (Klor-Con M20) tab 40 mEq, 40 mEq, Oral, Q4H    meclizine (Antivert) tab 12.5 mg, 12.5 mg, Oral, TID PRN    sodium chloride 0.9% infusion, , Intravenous, Continuous    acetaminophen (Tylenol) tab 650 mg, 650 mg, Oral, Q4H PRN **OR** acetaminophen (Tylenol) rectal suppository 650 mg, 650 mg, Rectal, Q4H PRN    hydrALAzine (Apresoline) 20 mg/mL injection 10 mg, 10 mg, Intravenous, Q2H PRN    ondansetron (Zofran) 4 MG/2ML injection 4 mg, 4 mg, Intravenous, Q6H PRN    metoclopramide (Reglan) 5 mg/mL injection 5 mg, 5 mg, Intravenous, Q8H PRN    acetaminophen (Tylenol) tab 650 mg, 650 mg, Oral, Q6H PRN    ondansetron (Zofran) 4 MG/2ML injection 4 mg, 4 mg, Intravenous, Q6H PRN    albuterol (Ventolin) (2.5 MG/3ML) 0.083%  nebulizer solution 2.5 mg, 2.5 mg, Nebulization, Q4H PRN    carvedilol (Coreg) tab 12.5 mg, 12.5 mg, Oral, BID with meals    cyclobenzaprine (Flexeril) tab 10 mg, 10 mg, Oral, Nightly PRN    famotidine (Pepcid) tab 20 mg, 20 mg, Oral, BID    fluticasone-salmeterol (Advair Diskus) 250-50 MCG/ACT inhaler 1 puff, 1 puff, Inhalation, 2 times per day    gabapentin (Neurontin) cap 100 mg, 100 mg, Oral, Nightly    cetirizine (ZyrTEC) tab 10 mg, 10 mg, Oral, Daily    montelukast (Singulair) tab 10 mg, 10 mg, Oral, Nightly    predniSONE (Deltasone) tab 5 mg, 5 mg, Oral, Daily    rosuvastatin (Crestor) tab 20 mg, 20 mg, Oral, Nightly    losartan (Cozaar) tab 50 mg, 50 mg, Oral, Daily    nitrofurantoin monohydrate macro (Macrobid) cap 100 mg, 100 mg, Oral, BID with meals    hydroxychloroquine (Plaquenil) tab 300 mg, 300 mg, Oral, Daily    oxyCODONE immediate release tab 5 mg, 5 mg, Oral, Q6H PRN    clopidogrel (Plavix) tab 75 mg, 75 mg, Oral, Daily    insulin aspart (NovoLOG) 100 Units/mL FlexPen 1-11 Units, 1-11 Units, Subcutaneous, TID CC    insulin degludec (Tresiba) 100 units/mL flextouch 10 Units, 10 Units, Subcutaneous, Nightly    Assessment and Plan:   #Left Cerebellar Infarcts  - pt with new sudden onset neuro changes  - 2 small cerebellar infarcts seen on admission MRI  -Neurology consulted  -Patient already took 3 baby aspirin's prior to coming to ED   -Neurology consulted  - CTA negative for LVO, but showing small subsegmental Pes  -f/u lipid panel, A1c, TSH, ECHO   -Continue statin, will add plavix  -PT/OT/ST eval  -stroke alert called 1/19 -> stroke CT non-acute, MRI pending, no TNK given GI bleed history  -statin increased to 40 mg     #Dizziness  Improved   -Likely in setting of above  -Meclizine prn   -Neurology consulted     #PE   -Patient is hemodynamically stable, EKG NSR and troponin not elevated.  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  Already took 3 baby aspirins prior to admission, will hold off further AC for now in setting of prior Anemia and GI AVMs   -Previously had IVC filter placed in March 2024 for PE at that time (removed June 2024).   -IR consult for IVC eval again   -Needs eval for hypercoagulable state. Risk factors of recent car ride, overall sedentary state, breast cancer. Sees Dr. 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     Results:     Recent Labs   Lab 01/17/25 2002 01/18/25  0943 01/19/25  0629   RBC 3.87 3.40* 3.30*   HGB 9.2* 8.2* 8.2*   HCT 30.7* 26.7* 26.6*   MCV 79.3* 78.5* 80.6   MCH 23.8* 24.1* 24.8*   MCHC 30.0* 30.7* 30.8*   RDW 17.4* 17.2* 17.3*   NEPRELIM 9.35* 6.07 11.91*   WBC 11.2* 9.1 15.5*   .0 301.0 316.0         Recent Labs   Lab 01/17/25 2002 01/18/25  0943 01/19/25  0629   * 204* 249*   BUN 16 15 15   CREATSERUM 0.93 0.87 0.89   EGFRCR 60 65 63   CA 9.3 9.0 9.7    139 138   K 3.7 3.9 3.5    104 103   CO2 26.0 27.0 26.0         Imaging:   CT STROKE BRAIN (NO IV)(CPT=70450)    Result Date: 1/19/2025  CONCLUSION:  1. Small focus of hypoattenuation involving the left cerebellar hemisphere, which corresponds to the acute infarct demonstrated on the prior MRI brain dated 01/17/2025. 2. Otherwise, no transcortical area of hypoattenuation to  suggest an acute infarct.  If there is clinical concern for an acute infarct, consider further evaluation with an MRI of the brain. 3. No acute intracranial hemorrhage, midline shift, mass effect, or hydrocephalus. 4. Redemonstrated postoperative changes from prior frontal craniotomy for aneurysm clipping along the course of the anterior cerebral arteries.  The associated metallic artifact from the aneurysm clips limits evaluation of the adjacent structures. 5. Lesser incidental findings described above.    Impression points 1-3 were communicated via telephone to Leonidas SOW at 9:39 a.m. on 01/19/2025 by Joe Deleon MD  Dictated by (CST): Joe Deleon MD on 1/19/2025 at 9:34 AM     Finalized by (CST): Joe Deleon MD on 1/19/2025 at 9:41 AM          MRI BRAIN WO ACUTE (3) SEQUENCE (CPT=70551)    Result Date: 1/18/2025  CONCLUSION:   Limited 3-sequence stroke protocol study was performed. Within these parameters:  1. Acute to early subacute lacunar infarctions in the left cerebellar hemisphere. 2. Stable postoperative changes from a remote frontal craniotomy and anterior parafalcine aneurysm clipping. 3. Stable mild chronic microangiopathic ischemic changes.   Results of this examination were discussed with the patient's nurse, Deepak, by the on-call teleradiologist Dr. Smiley Trejo at 22:32 on 01/17/2025.  A preliminary report was issued by the [facility] teleradiology service. There are no major discrepancies.   Dictated by (CST): Abraham Mar MD on 1/18/2025 at 10:11 AM     Finalized by (CST): Abraham Mar MD on 1/18/2025 at 10:16 AM          US VENOUS DOPPLER LEG BILAT - DIAG IMG (CPT=93970)    Result Date: 1/18/2025  CONCLUSION:  No DVT in either lower extremity.   A preliminary report was issued by the [facility] teleradiology service. There are no major discrepancies.  Dictated by (CST): Abraham Mar MD on 1/18/2025 at 9:32 AM     Finalized by (CST): Abraham Mar MD  on 1/18/2025 at 9:33 AM          CTA CHEST+CTA ABDOMEN DISSECT SET (CPT=71275/67716)    Result Date: 1/17/2025  CONCLUSION:  1. Acute pulmonary emboli involving the bilateral lower lobe subsegmental pulmonary artery branches.  2. No evidence of aortic dissection.  3. Ascending thoracic aortic ectasia. Multifocal irregular partially ulcerated peripheral mural thrombus is observed throughout the thoracic and abdominal aorta.   4. Dilatation of the main pulmonary artery trunk may relate to underlying pulmonary hypertension.   5. Moderate to severe centrilobular/paraseptal emphysema with extensive postradiation fibrosis and chronic opacity of the right middle lobe.  6. Multifocal pleuroparenchymal scarring is apparent.  7. Proximal distal colonic diverticulosis without CT evidence of acute complication in the imaged volume.  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  9. Lesser incidental findings as above.    Results of this examination were discussed with the patient's physician, Dr. Cottrell, by Dr. Gruber at 2213 on 01/17/2025.   Dictated by (CST): Sharath Gruber MD on 1/17/2025 at 10:10 PM     Finalized by (CST): Sharath Gruber MD on 1/17/2025 at 10:22 PM          XR CHEST AP PORTABLE  (CPT=71045)    Result Date: 1/17/2025  CONCLUSION:  1. Unchanged chronic fibrotic changes throughout the lungs.  2. Cardiomegaly.    Dictated by (CST): Sharath Gruber MD on 1/17/2025 at 7:40 PM     Finalized by (CST): Sharath Gruber MD on 1/17/2025 at 7:42 PM          CT STROKE CTA BRAIN/CTA NECK (W IV)(CPT=70496/37098)    Result Date: 1/17/2025  CONCLUSION:  1. Negative for hemodynamically significant stenosis or occlusion of the anterior or posterior circulations.   2. The internal carotid arteries demonstrate tortuosity proximally without evidence of hemodynamically stenosis or occlusion.  3. The vertebral arteries demonstrate contiguous patency bilaterally without evidence of flow-limiting stenosis.  4. Extensive  irregular thrombus of the aortic arch is noted. Thrombus extends into the proximal left subclavian artery origin, contributing to luminal narrowing.  5. Dilatation of the main pulmonary artery trunk may relate to underlying pulmonary hypertension.  6. 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   7. Lesser incidental findings as above.   Dictated by (CST): Sharath Gruber MD on 1/17/2025 at 7:27 PM     Finalized by (CST): Sharath Gruber MD on 1/17/2025 at 7:36 PM          CT STROKE BRAIN (NO IV)(CPT=70450)    Result Date: 1/17/2025  CONCLUSION:  1. No acute intracranial hemorrhage or evidence of extended large vessel infarction. If there is clinical concern for acute ischemia, follow-up MRI suggested.   2. Postoperative changes of frontal craniotomy with anterior parafalcine aneurysm clipping.  3. Senescent changes of parenchymal volume loss with sequela of chronic microvascular ischemic disease.  4. There is large vessel atherosclerosis of the anterior and posterior intracranial circulations.  5. Lesser incidental findings as above.    This report was called immediately at 1915 hours to Emergency Department Pod 3 and discussed with the patient's ER physician, Dr. Cottrell.    Dictated by (CST): Sharath Gruber MD on 1/17/2025 at 7:11 PM     Finalized by (CST): Sharath Gruber MD on 1/17/2025 at 7:15 PM         EKG 12 Lead    Result Date: 1/18/2025  Normal sinus rhythm Minimal voltage criteria for LVH, may be normal variant ( R in aVL ) Inferior infarct , age undetermined Anterior infarct (cited on or before 22-OCT-2024) Abnormal ECG When compared with ECG of 22-OCT-2024 12:36, Inferior infarct is now Present Nonspecific T wave abnormality, improved in Inferior leads Confirmed by TERESSA FITZGERALD, JUSTIN ([id number]) on 1/18/2025 2:32:13 PM       Richard Guerra MD  1/19/2025

## 2025-01-19 NOTE — PLAN OF CARE
Problem: HEMATOLOGIC - ADULT  Goal: Free from bleeding injury  Description: (Example usage: patient with low platelets)  INTERVENTIONS:  - Avoid intramuscular injections, enemas and rectal medication administration  - Ensure safe mobilization of patient  - Hold pressure on venipuncture sites to achieve adequate hemostasis  - Assess for signs and symptoms of internal bleeding  - Monitor lab trends  - Patient is to report abnormal signs of bleeding to staff  - Avoid use of toothpicks and dental floss  - Use electric shaver for shaving  - Use soft bristle tooth brush  - Limit straining and forceful nose blowing  Outcome: Progressing     Problem: Patient Centered Care  Goal: Patient preferences are identified and integrated in the patient's plan of care  Description: Interventions:  - Provide timely, complete, and accurate information to patient/family  - Incorporate patient and family knowledge, values, beliefs, and cultural backgrounds into the planning and delivery of care  - Encourage patient/family to participate in care and decision-making at the level they choose  - Honor patient and family perspectives and choices  Outcome: Progressing     Problem: Diabetes/Glucose Control  Goal: Glucose maintained within prescribed range  Description: INTERVENTIONS:  - Monitor Blood Glucose as ordered  - Assess for signs and symptoms of hyperglycemia and hypoglycemia  - Administer ordered medications to maintain glucose within target range  - Assess barriers to adequate nutritional intake and initiate nutrition consult as needed  - Instruct patient on self management of diabetes  Outcome: Progressing     Problem: CARDIOVASCULAR - ADULT  Goal: Maintains optimal cardiac output and hemodynamic stability  Description: INTERVENTIONS:  - Monitor vital signs, rhythm, and trends  - Monitor for bleeding, hypotension and signs of decreased cardiac output  - Evaluate effectiveness of vasoactive medications to optimize hemodynamic  stability  - Monitor arterial and/or venous puncture sites for bleeding and/or hematoma  - Assess quality of pulses, skin color and temperature  - Assess for signs of decreased coronary artery perfusion - ex. Angina  - Evaluate fluid balance, assess for edema, trend weights  Outcome: Progressing  Goal: Absence of cardiac arrhythmias or at baseline  Description: INTERVENTIONS:  - Continuous cardiac monitoring, monitor vital signs, obtain 12 lead EKG if indicated  - Evaluate effectiveness of antiarrhythmic and heart rate control medications as ordered  - Initiate emergency measures for life threatening arrhythmias  - Monitor electrolytes and administer replacement therapy as ordered  Outcome: Progressing     Problem: METABOLIC/FLUID AND ELECTROLYTES - ADULT  Goal: Glucose maintained within prescribed range  Description: INTERVENTIONS:  - Monitor Blood Glucose as ordered  - Assess for signs and symptoms of hyperglycemia and hypoglycemia  - Administer ordered medications to maintain glucose within target range  - Assess barriers to adequate nutritional intake and initiate nutrition consult as needed  - Instruct patient on self management of diabetes  Outcome: Progressing  Goal: Electrolytes maintained within normal limits  Description: INTERVENTIONS:  - Monitor labs and rhythm and assess patient for signs and symptoms of electrolyte imbalances  - Administer electrolyte replacement as ordered  - Monitor response to electrolyte replacements, including rhythm and repeat lab results as appropriate  - Fluid restriction as ordered  - Instruct patient on fluid and nutrition restrictions as appropriate  Outcome: Progressing     Problem: NEUROLOGICAL - ADULT  Goal: Achieves stable or improved neurological status  Description: INTERVENTIONS  - Assess for and report changes in neurological status  - Initiate measures to prevent increased intracranial pressure  - Maintain blood pressure and fluid volume within ordered parameters to  optimize cerebral perfusion and minimize risk of hemorrhage  - Monitor temperature, glucose, and sodium. Initiate appropriate interventions as ordered  Outcome: Progressing     Problem: PAIN - ADULT  Goal: Verbalizes/displays adequate comfort level or patient's stated pain goal  Description: INTERVENTIONS:  - Encourage pt to monitor pain and request assistance  - Assess pain using appropriate pain scale  - Administer analgesics based on type and severity of pain and evaluate response  - Implement non-pharmacological measures as appropriate and evaluate response  - Consider cultural and social influences on pain and pain management  - Manage/alleviate anxiety  - Utilize distraction and/or relaxation techniques  - Monitor for opioid side effects  - Notify MD/LIP if interventions unsuccessful or patient reports new pain  - Anticipate increased pain with activity and pre-medicate as appropriate  Outcome: Progressing     Problem: SAFETY ADULT - FALL  Goal: Free from fall injury  Description: INTERVENTIONS:  - Assess pt frequently for physical needs  - Identify cognitive and physical deficits and behaviors that affect risk of falls.  - [place] fall precautions as indicated by assessment.  - Educate pt/family on patient safety including physical limitations  - Instruct pt to call for assistance with activity based on assessment  - Modify environment to reduce risk of injury  - Provide assistive devices as appropriate  - Consider OT/PT consult to assist with strengthening/mobility  - Encourage toileting schedule  Outcome: Progressing     Problem: DISCHARGE PLANNING  Goal: Discharge to home or other facility with appropriate resources  Description: INTERVENTIONS:  - Identify barriers to discharge w/pt and caregiver  - Include patient/family/discharge partner in discharge planning  - Arrange for needed discharge resources and transportation as appropriate  - Identify discharge learning needs (meds, wound care, etc)  -  Arrange for interpreters to assist at discharge as needed  - Consider post-discharge preferences of patient/family/discharge partner  - Complete POLST form as appropriate  - Assess patient's ability to be responsible for managing their own health  - Refer to Case Management Department for coordinating discharge planning if the patient needs post-hospital services based on physician/LIP order or complex needs related to functional status, cognitive ability or social support system  Outcome: Progressing

## 2025-01-19 NOTE — PROGRESS NOTES
[facility]  GI SERVICE PROGRESS NOTE    Chester Bautista Patient Status:  Inpatient    1940 MRN [id number]   Location [facility] 3W/SW Attending Richard Guerra MD   Hosp Day # 1 PCP Heike Sorto MD       Subjective:     Ms. Bautista suffered another neurologic event this morning with dull pain in right hand, then muscular weakness right hand and right leg, unable to raise right leg off bed.  Similar severe headache returned after that, then nausea and at least 1 episode of vomiting.    Stroke called, CT repeated, reassuring.    Waiting for repeat MRI scan.    Feeling better this afternoon.  Neurologic deficits seem to be resolving again.    Afebrile; HR 80s - 106; /68    Objective:   Blood pressure 150/73, pulse 87, temperature 98.4 °F (36.9 °C), temperature source Oral, resp. rate 16, height 5' 1\" (1.549 m), weight 138 lb (62.6 kg), SpO2 98%.    GENERAL: Bright positive outgoing  HEENT: Normocephalic; pupils equally round and reactive to light; no temporal wasting; no thyromegaly or cervical lymphadenopathy  PULM: Lungs clear to auscultation anteriorly  CV: RRR not tachycardic  ABD: soft/nondistended  EXT: No edema  SKIN: No rash        Results:       Recent Labs   Lab 25  0943 25  0629   RBC 3.87 3.40* 3.30*   HGB 9.2* 8.2* 8.2*   HCT 30.7* 26.7* 26.6*   MCV 79.3* 78.5* 80.6   MCH 23.8* 24.1* 24.8*   MCHC 30.0* 30.7* 30.8*   RDW 17.4* 17.2* 17.3*   NEPRELIM 9.35* 6.07 11.91*   WBC 11.2* 9.1 15.5*   .0 301.0 316.0       Lab Results   Component Value Date    INR 0.94 2025    INR 0.93 2023       Recent Labs   Lab 25  0943 25  0629   * 204* 249*   BUN 16 15 15   CREATSERUM 0.93 0.87 0.89   CA 9.3 9.0 9.7    139 138   K 3.7 3.9 3.5    104 103   CO2 26.0 27.0 26.0       No results for input(s): \"MACRINA\", \"LIP\" in the last 168 hours.    Recent Labs   Lab 25  1422   MG 1.3*        No results for input(s): \"URINE\", \"CULTI\", \"BLDSMR\" in the last 168 hours.      CT STROKE BRAIN (NO IV)(CPT=70450)    Result Date: 1/19/2025  CONCLUSION:  1. Small focus of hypoattenuation involving the left cerebellar hemisphere, which corresponds to the acute infarct demonstrated on the prior MRI brain dated 01/17/2025. 2. Otherwise, no transcortical area of hypoattenuation to suggest an acute infarct.  If there is clinical concern for an acute infarct, consider further evaluation with an MRI of the brain. 3. No acute intracranial hemorrhage, midline shift, mass effect, or hydrocephalus. 4. Redemonstrated postoperative changes from prior frontal craniotomy for aneurysm clipping along the course of the anterior cerebral arteries.  The associated metallic artifact from the aneurysm clips limits evaluation of the adjacent structures. 5. Lesser incidental findings described above.    Impression points 1-3 were communicated via telephone to Leonidas SOW at 9:39 a.m. on 01/19/2025 by Joe Deleon MD  Dictated by (CST): Joe Deleon MD on 1/19/2025 at 9:34 AM     Finalized by (CST): Joe Deleon MD on 1/19/2025 at 9:41 AM          MRI BRAIN WO ACUTE (3) SEQUENCE (CPT=70551)    Result Date: 1/18/2025  CONCLUSION:   Limited 3-sequence stroke protocol study was performed. Within these parameters:  1. Acute to early subacute lacunar infarctions in the left cerebellar hemisphere. 2. Stable postoperative changes from a remote frontal craniotomy and anterior parafalcine aneurysm clipping. 3. Stable mild chronic microangiopathic ischemic changes.   Results of this examination were discussed with the patient's nurse, Deepak, by the on-call teleradiologist Dr. Smiley Trejo at 22:32 on 01/17/2025.  A preliminary report was issued by the [facility] teleradiology service. There are no major discrepancies.   Dictated by (CST): Abraham Mar MD on 1/18/2025 at 10:11 AM     Finalized by (CST): Abraham Mar,  MD on 1/18/2025 at 10:16 AM          US VENOUS DOPPLER LEG BILAT - DIAG IMG (CPT=93970)    Result Date: 1/18/2025  CONCLUSION:  No DVT in either lower extremity.   A preliminary report was issued by the [facility] teleradiology service. There are no major discrepancies.  Dictated by (CST): Abraham Mar MD on 1/18/2025 at 9:32 AM     Finalized by (CST): Abraham Mar MD on 1/18/2025 at 9:33 AM          CTA CHEST+CTA ABDOMEN DISSECT SET (CPT=71275/30398)    Result Date: 1/17/2025  CONCLUSION:  1. Acute pulmonary emboli involving the bilateral lower lobe subsegmental pulmonary artery branches.  2. No evidence of aortic dissection.  3. Ascending thoracic aortic ectasia. Multifocal irregular partially ulcerated peripheral mural thrombus is observed throughout the thoracic and abdominal aorta.   4. Dilatation of the main pulmonary artery trunk may relate to underlying pulmonary hypertension.   5. Moderate to severe centrilobular/paraseptal emphysema with extensive postradiation fibrosis and chronic opacity of the right middle lobe.  6. Multifocal pleuroparenchymal scarring is apparent.  7. Proximal distal colonic diverticulosis without CT evidence of acute complication in the imaged volume.  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  9. Lesser incidental findings as above.    Results of this examination were discussed with the patient's physician, Dr. Cottrell, by Dr. Gruber at 2213 on 01/17/2025.   Dictated by (CST): Sharath Gruber MD on 1/17/2025 at 10:10 PM     Finalized by (CST): Sharath Gruber MD on 1/17/2025 at 10:22 PM          XR CHEST AP PORTABLE  (CPT=71045)    Result Date: 1/17/2025  CONCLUSION:  1. Unchanged chronic fibrotic changes throughout the lungs.  2. Cardiomegaly.    Dictated by (CST): Sharath Gruber MD on 1/17/2025 at 7:40 PM     Finalized by (CST): Sharath Gruber MD on 1/17/2025 at 7:42 PM          CT STROKE CTA BRAIN/CTA NECK (W IV)(CPT=70496/09047)    Result Date:  1/17/2025  CONCLUSION:  1. Negative for hemodynamically significant stenosis or occlusion of the anterior or posterior circulations.   2. The internal carotid arteries demonstrate tortuosity proximally without evidence of hemodynamically stenosis or occlusion.  3. The vertebral arteries demonstrate contiguous patency bilaterally without evidence of flow-limiting stenosis.  4. 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 Thrombus extends into the proximal left subclavian artery origin, contributing to luminal narrowing.  5. Dilatation of the main pulmonary artery trunk may relate to underlying pulmonary hypertension.  6. 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   7. Lesser incidental findings as above.   Dictated by (CST): Sharath Gruber MD on 1/17/2025 at 7:27 PM     Finalized by (CST): Sharath Gruber MD on 1/17/2025 at 7:36 PM          CT STROKE BRAIN (NO IV)(CPT=70450)    Result Date: 1/17/2025  CONCLUSION:  1. No acute intracranial hemorrhage or evidence of extended large vessel infarction. If there is clinical concern for acute ischemia, follow-up MRI suggested.   2. Postoperative changes of frontal craniotomy with anterior parafalcine aneurysm clipping.  3. Senescent changes of parenchymal volume loss with sequela of chronic microvascular ischemic disease.  4. There is large vessel atherosclerosis of the anterior and posterior intracranial circulations.  5. Lesser incidental findings as above.    This report was called immediately at 1915 hours to Emergency Department Pod 3 and discussed with the patient's ER physician, Dr. Cottrell.    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     As before, she denies ever seeing wild melena or hematochezia with the occult GI bleeding.  She describes repeatedly becoming aware of worsening fatigue and dyspnea, found to have anemia on outpatient labs.     Well-known to Dr. 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     This time, Ms. Bautista was admitted for neurologic event.  She presented to the ED 1/17/2025 reporting dizziness/room spinning, numbness and tingling in right arm, feeling off balance.     Workup showing \"extensive irregular thrombus of aortic arch\" possible embolic source; 2 acute lacunar infarctions of left cerebellum     Started Plavix anticoagulation 1/18/2025.    CODE STROKE 1/19/2025 am     Patient and H&H appear stable 1/19/2025; hemoglobin overall seems to be running 8.1g-8.4g on labs past 14 days.    Recommendations:  Neurology Service evaluation and workup noted  Started Plavix anticoagulation 1/18/2025  GI service has been consulted regarding the issue of starting Plavix anticoagulation in the setting of known chronic obscure GI bleeding, chronic blood loss anemia due to gastric, small bowel, colonic AVM lesions  After discharge, continue close outpatient monitoring CBC.  May require ?weekly CBC and iron studies next few months  As per operative notes summarized in my consultation, these recurring AVM lesions do not appear amenable to endoscopy and ablation treatments.  Ms. Bautista just started octreotide Sandostatin LAR monthly injections about 9 months ago which appear to be helping.        Thank you for allowing me to  participate in the care of your patient.     Jose Haque MD  1/18/2025             Jose Haque MD          Over 35 minutes spent today reviewing today's and recent data; greater than 50% of that time was spent counseling the patient and coordination of care with RN and other involved physicians.    Digital transcription software was utilized to produce this note. The note was proofread for content only. Typographical errors may remain.

## 2025-01-20 LAB
ANION GAP SERPL CALC-SCNC: 11 MMOL/L (ref 0–18)
BASOPHILS # BLD AUTO: 0.08 X10(3) UL (ref 0–0.2)
BASOPHILS NFR BLD AUTO: 0.8 %
BUN BLD-MCNC: 12 MG/DL (ref 9–23)
BUN/CREAT SERPL: 11.2 (ref 10–20)
CALCIUM BLD-MCNC: 10.7 MG/DL (ref 8.7–10.4)
CHLORIDE SERPL-SCNC: 106 MMOL/L (ref 98–112)
CO2 SERPL-SCNC: 20 MMOL/L (ref 21–32)
CREAT BLD-MCNC: 1.07 MG/DL
DEPRECATED RDW RBC AUTO: 53.2 FL (ref 35.1–46.3)
EGFRCR SERPLBLD CKD-EPI 2021: 51 ML/MIN/1.73M2 (ref 60–?)
EOSINOPHIL # BLD AUTO: 0.34 X10(3) UL (ref 0–0.7)
EOSINOPHIL NFR BLD AUTO: 3.4 %
ERYTHROCYTE [DISTWIDTH] IN BLOOD BY AUTOMATED COUNT: 18.2 % (ref 11–15)
GLUCOSE BLD-MCNC: 140 MG/DL (ref 70–99)
GLUCOSE BLDC GLUCOMTR-MCNC: 199 MG/DL (ref 70–99)
GLUCOSE BLDC GLUCOMTR-MCNC: 203 MG/DL (ref 70–99)
GLUCOSE BLDC GLUCOMTR-MCNC: 204 MG/DL (ref 70–99)
GLUCOSE BLDC GLUCOMTR-MCNC: 265 MG/DL (ref 70–99)
HCT VFR BLD AUTO: 30.1 %
HGB BLD-MCNC: 9 G/DL
IMM GRANULOCYTES # BLD AUTO: 0.06 X10(3) UL (ref 0–1)
IMM GRANULOCYTES NFR BLD: 0.6 %
LYMPHOCYTES # BLD AUTO: 1.29 X10(3) UL (ref 1–4)
LYMPHOCYTES NFR BLD AUTO: 13 %
MCH RBC QN AUTO: 24.5 PG (ref 26–34)
MCHC RBC AUTO-ENTMCNC: 29.9 G/DL (ref 31–37)
MCV RBC AUTO: 81.8 FL
MONOCYTES # BLD AUTO: 0.86 X10(3) UL (ref 0.1–1)
MONOCYTES NFR BLD AUTO: 8.7 %
NEUTROPHILS # BLD AUTO: 7.28 X10 (3) UL (ref 1.5–7.7)
NEUTROPHILS # BLD AUTO: 7.28 X10(3) UL (ref 1.5–7.7)
NEUTROPHILS NFR BLD AUTO: 73.5 %
OSMOLALITY SERPL CALC.SUM OF ELEC: 286 MOSM/KG (ref 275–295)
PA ADP PRP-ACNC: 275
PLATELET # BLD AUTO: 193 10(3)UL (ref 150–450)
POTASSIUM SERPL-SCNC: 4.4 MMOL/L (ref 3.5–5.1)
RBC # BLD AUTO: 3.68 X10(6)UL
SODIUM SERPL-SCNC: 137 MMOL/L (ref 136–145)
WBC # BLD AUTO: 9.9 X10(3) UL (ref 4–11)

## 2025-01-20 PROCEDURE — 99233 SBSQ HOSP IP/OBS HIGH 50: CPT | Performed by: HOSPITALIST

## 2025-01-20 PROCEDURE — 99233 SBSQ HOSP IP/OBS HIGH 50: CPT | Performed by: OTHER

## 2025-01-20 PROCEDURE — 99232 SBSQ HOSP IP/OBS MODERATE 35: CPT | Performed by: STUDENT IN AN ORGANIZED HEALTH CARE EDUCATION/TRAINING PROGRAM

## 2025-01-20 PROCEDURE — 99232 SBSQ HOSP IP/OBS MODERATE 35: CPT | Performed by: PHYSICIAN ASSISTANT

## 2025-01-20 RX ORDER — SODIUM PHOSPHATE, DIBASIC AND SODIUM PHOSPHATE, MONOBASIC 7; 19 G/230ML; G/230ML
1 ENEMA RECTAL ONCE AS NEEDED
Status: DISCONTINUED | OUTPATIENT
Start: 2025-01-20 | End: 2025-01-29

## 2025-01-20 RX ORDER — CLOPIDOGREL BISULFATE 75 MG/1
150 TABLET ORAL ONCE
Status: COMPLETED | OUTPATIENT
Start: 2025-01-20 | End: 2025-01-20

## 2025-01-20 RX ORDER — ROSUVASTATIN CALCIUM 20 MG/1
40 TABLET, COATED ORAL NIGHTLY
Status: DISCONTINUED | OUTPATIENT
Start: 2025-01-20 | End: 2025-01-22

## 2025-01-20 RX ORDER — BISACODYL 10 MG
10 SUPPOSITORY, RECTAL RECTAL
Status: DISCONTINUED | OUTPATIENT
Start: 2025-01-20 | End: 2025-01-29

## 2025-01-20 RX ORDER — POLYETHYLENE GLYCOL 3350 17 G/17G
17 POWDER, FOR SOLUTION ORAL DAILY PRN
Status: DISCONTINUED | OUTPATIENT
Start: 2025-01-20 | End: 2025-01-29

## 2025-01-20 RX ORDER — DOCUSATE SODIUM 100 MG/1
100 CAPSULE, LIQUID FILLED ORAL 2 TIMES DAILY
Status: DISCONTINUED | OUTPATIENT
Start: 2025-01-20 | End: 2025-02-05

## 2025-01-20 NOTE — PROGRESS NOTES
[facility]  part of [facility]    Progress Note    Chester Bautista Patient Status:  Inpatient    1940 MRN [id number]   Location [facility] 3W/SW Attending Richard Guerra MD   Hosp Day # 2 PCP Heike Sorto MD       Subjective:   Chester Bautista is a(n) 84 year old female was seen and examined  Feeling about the same today  Currently resting in bed, comfortably   No cp, sob, f,c,n,v, abd pain or HA     Objective:   Blood pressure 145/63, pulse 101, temperature 98.2 °F (36.8 °C), temperature source Oral, resp. rate 17, height 5' 1\" (1.549 m), weight 136 lb 8 oz (61.9 kg), SpO2 94%.    General: AAF, NAD  HEENT: Normocephalic atraumatic. Moist mucous membranes.   Respiratory: Clear to auscultation bilaterally. No wheezes. No rhonchi.  Cardiovascular: RRR  Abdomen: Soft, nontender, nondistended.  Neurologic: No focal neurological deficits.   Musculoskeletal: Moves all extremities.  Extremities: No edema or cyanosis.  Integument: No rashes or lesions.   Psychiatric: Appropriate mood and affect.    Current Inpatient Medications:     Current Facility-Administered Medications:     rosuvastatin (Crestor) tab 40 mg, 40 mg, Oral, Nightly    ceFEPIme (Maxipime) 1 g in sodium chloride 0.9% 100 mL IVPB-MBP, 1 g, Intravenous, Q12H    docusate sodium (Colace) cap 100 mg, 100 mg, Oral, BID    polyethylene glycol (PEG 3350) (Miralax) 17 g oral packet 17 g, 17 g, Oral, Daily PRN    magnesium hydroxide (Milk of Magnesia) 400 MG/5ML oral suspension 30 mL, 30 mL, Oral, Daily PRN    bisacodyl (Dulcolax) 10 MG rectal suppository 10 mg, 10 mg, Rectal, Daily PRN    fleet enema (Fleet) rectal enema 133 mL, 1 enema, Rectal, Once PRN    meclizine (Antivert) tab 12.5 mg, 12.5 mg, Oral, TID PRN    acetaminophen (Tylenol) tab 650 mg, 650 mg, Oral, Q4H PRN **OR** acetaminophen (Tylenol) rectal suppository 650 mg, 650 mg, Rectal, Q4H PRN    hydrALAzine (Apresoline) 20 mg/mL injection 10  mg, 10 mg, Intravenous, Q2H PRN    ondansetron (Zofran) 4 MG/2ML injection 4 mg, 4 mg, Intravenous, Q6H PRN    metoclopramide (Reglan) 5 mg/mL injection 5 mg, 5 mg, Intravenous, Q8H PRN    acetaminophen (Tylenol) tab 650 mg, 650 mg, Oral, Q6H PRN    ondansetron (Zofran) 4 MG/2ML injection 4 mg, 4 mg, Intravenous, Q6H PRN    albuterol (Ventolin) (2.5 MG/3ML) 0.083% nebulizer solution 2.5 mg, 2.5 mg, Nebulization, Q4H PRN    carvedilol (Coreg) tab 12.5 mg, 12.5 mg, Oral, BID with meals    cyclobenzaprine (Flexeril) tab 10 mg, 10 mg, Oral, Nightly PRN    famotidine (Pepcid) tab 20 mg, 20 mg, Oral, BID    fluticasone-salmeterol (Advair Diskus) 250-50 MCG/ACT inhaler 1 puff, 1 puff, Inhalation, 2 times per day    gabapentin (Neurontin) cap 100 mg, 100 mg, Oral, Nightly    cetirizine (ZyrTEC) tab 10 mg, 10 mg, Oral, Daily    montelukast (Singulair) tab 10 mg, 10 mg, Oral, Nightly    predniSONE (Deltasone) tab 5 mg, 5 mg, Oral, Daily    losartan (Cozaar) tab 50 mg, 50 mg, Oral, Daily    hydroxychloroquine (Plaquenil) tab 300 mg, 300 mg, Oral, Daily    oxyCODONE immediate release tab 5 mg, 5 mg, Oral, Q6H PRN    clopidogrel (Plavix) tab 75 mg, 75 mg, Oral, Daily    insulin aspart (NovoLOG) 100 Units/mL FlexPen 1-11 Units, 1-11 Units, Subcutaneous, TID CC    insulin degludec (Tresiba) 100 units/mL flextouch 10 Units, 10 Units, Subcutaneous, Nightly    Assessment and Plan:   #Left Cerebellar Infarcts  - pt with new sudden onset neuro changes  - 2 small cerebellar infarcts seen on admission MRI  -Neurology consulted  -Patient already took 3 baby aspirin's prior to coming to ED   -Neurology consulted  - CTA negative for LVO, but showing small subsegmental Pes  -f/u lipid panel, A1c, TSH, ECHO   -Continue statin, will add plavix  -PT/OT/ST eval  -stroke alert called 1/19 -> stroke CT non-acute, MRI showing small CVA in L thalamus   -statin increased to 40 mg  -s/p plavix loading   -cv surgery to evaluate       #Dizziness  Improved   -Likely in setting of above  -Meclizine prn   -Neurology consulted     #PE   -Patient is hemodynamically stable, EKG NSR and troponin not elevated.  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 Already took 3 baby aspirins prior to admission, will hold off further AC for now in setting of prior Anemia and GI AVMs   -Previously had IVC filter placed in March 2024 for PE at that time (removed June 2024).   -IR consult for IVC eval again   -Needs eval for hypercoagulable state. Risk factors of recent car ride, overall sedentary state, breast cancer. Sees Dr. 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     Results:     Recent Labs   Lab 01/17/25 2002 01/18/25 0943 01/19/25  0629   RBC 3.87 3.40* 3.30*   HGB 9.2* 8.2* 8.2*   HCT 30.7* 26.7* 26.6*   MCV 79.3* 78.5* 80.6   MCH 23.8* 24.1* 24.8*   MCHC 30.0* 30.7* 30.8*   RDW 17.4* 17.2* 17.3*   NEPRELIM 9.35* 6.07 11.91*   WBC 11.2* 9.1 15.5*   .0 301.0 316.0         Recent Labs   Lab 01/17/25 2002 01/18/25 0943 01/19/25  0629 01/19/25  1525   * 204* 249*  --    BUN 16 15 15  --    CREATSERUM 0.93 0.87 0.89  --    EGFRCR 60 65 63  --    CA 9.3 9.0 9.7  --     139 138  --    K 3.7 3.9 3.5 4.8    104 103  --    CO2  26.0 27.0 26.0  --          Imaging:   MRI BRAIN (CPT=70551)    Result Date: 1/20/2025  CONCLUSION:  1. Interval development of a punctate lacunar infarct of the left thalamus, concerning for acute ischemia.  2. Subacute lacunar infarcts of the left cerebellar hemisphere.   3. Postoperative changes of frontal craniotomy and parafalcine aneurysm clipping.  4. Senescent changes of parenchymal volume loss with mild sequela of chronic microvascular ischemic disease.  5. Lesser incidental findings as above.      A preliminary report was issued by the [facility] teleradiology service. There are no major discrepancies.  elm-remote.   Dictated by (CST): Sharath Gruber MD on 1/20/2025 at 9:24 AM     Finalized by (CST): Sharath Gruber MD on 1/20/2025 at 9:31 AM          XR CHEST AP PORTABLE  (CPT=71045)    Result Date: 1/20/2025  CONCLUSION:  1. Right-sided chest port in place.  2. Cardiomegaly with extensive pulmonary fibrosis.   elm-remote.   Dictated by (CST): Sharath Gruber MD on 1/20/2025 at 6:29 AM     Finalized by (CST): Sharath Gruber MD on 1/20/2025 at 6:32 AM          CT STROKE BRAIN (NO IV)(CPT=70450)    Result Date: 1/19/2025  CONCLUSION:  1. Small focus of hypoattenuation involving the left cerebellar hemisphere, which corresponds to the acute infarct demonstrated on the prior MRI brain dated 01/17/2025. 2. Otherwise, no transcortical area of hypoattenuation to suggest an acute infarct.  If there is clinical concern for an acute infarct, consider further evaluation with an MRI of the brain. 3. No acute intracranial hemorrhage, midline shift, mass effect, or hydrocephalus. 4. Redemonstrated postoperative changes from prior frontal craniotomy for aneurysm clipping along the course of the anterior cerebral arteries.  The associated metallic artifact from the aneurysm clips limits evaluation of the adjacent structures. 5. Lesser incidental findings described above.    Impression points 1-3 were communicated  via telephone to Leonidas SOW at 9:39 a.m. on 01/19/2025 by Joe Deleon MD  Dictated by (CST): Joe Deleon MD on 1/19/2025 at 9:34 AM     Finalized by (CST): Joe Deleon MD on 1/19/2025 at 9:41 AM                 Richard Guerra MD  1/20/2025

## 2025-01-20 NOTE — PLAN OF CARE
Reviewed MRI brain, tiny new infarct in left upper part of the thalamus  She had received 2 doses of plavix already, will load with 150mg plavix and check P2y12 after loading  Rosuvastatin increased to 40mg  Continue neuro checks q 4 hours  Will discuss with vascular surgery in the morning

## 2025-01-20 NOTE — PROGRESS NOTES
[facility]     Gastroenterology Progress Note    Chester Bautista Patient Status:  Inpatient    1940 MRN [id number]   Location [facility] 3W/SW Attending Richard Guerra MD   Hosp Day # 2 PCP Heike Sorto MD       Subjective:   Patient sitting comfortably in bed    Denies abdominal pain, nausea and vomiting.   No BM yesterday. No overt bleeding  Denies CP, SOB, dizziness and light headedness  Objective:   Blood pressure 153/72, pulse 108, temperature 98.2 °F (36.8 °C), temperature source Oral, resp. rate 17, height 5' 1\" (1.549 m), weight 136 lb 8 oz (61.9 kg), SpO2 94%. Body mass index is 25.79 kg/m².    Gen: awake, alert patient, NAD  HEENT: EOMI, the sclera appears anicteric, oropharynx clear, mucus membranes appear moist  CV: RRR  Lung: no conversational dyspnea   Abdomen: soft NTND abdomen with NABS appreciated   Skin: dry, warm, no jaundice  Ext: no LE edema is evident  Neuro: Alert and interactive  Psych: calm, cooperative    Assessment and Plan:   4 year old woman with Body mass index 25.85 kg/m² history Type 2 diabetes, hypertension, ?pulm HTN?,  rheumatoid arthritis, COPD; previous history frontal craniotomy with anterior parafalcine aneurysm clipping; multiple pulmonary emboli on previous CT scan 2024; well known to our service due to previous concern for occult GI bleeding and chronic blood loss anemia, 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     As before, she denies ever seeing wild melena or hematochezia with the occult GI bleeding.  She describes repeatedly becoming aware of worsening fatigue and dyspnea, found to have anemia on outpatient labs.     Well-known to Dr. 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     This time, Ms. Bautista was admitted for neurologic event.  She  presented to the ED 1/17/2025 reporting dizziness/room spinning, numbness and tingling in right arm, feeling off balance.     Workup showing \"extensive irregular thrombus of aortic arch\" possible embolic source; 2 acute lacunar infarctions of left cerebellum     Started Plavix anticoagulation 1/18/2025.     CODE STROKE 1/19/2025 am     Patient and H&H appear stable 1/19/2025; hemoglobin overall seems to be running 8.1g-8.4g on labs past 14 days.     Recommendations:  Neurology Service evaluation and workup noted  Started Plavix anticoagulation 1/18/2025  GI service has been consulted regarding the issue of starting Plavix anticoagulation in the setting of known chronic obscure GI bleeding, chronic blood loss anemia due to gastric, small bowel, colonic AVM lesions  After discharge, continue close outpatient monitoring CBC.  May require ?weekly CBC and iron studies next few months  As per operative notes summarized in my consultation, these recurring AVM lesions do not appear amenable to endoscopy and ablation treatments.  Ms. Bautista just started octreotide Sandostatin LAR monthly injections about 9 months ago which appear to be helping.  In the setting of no overt bleeding, no plans for endoscopic evaluation, GI will sign off    Pt is stable from a GI standpoint.  GI will sign off at this time.  Please, reach out to our team if new GI concerns arise.  Thank you for the opportunity to participate in this patient's care.      Case discussed with Zoila Mcqueen MD and Heike SOW.    Rahel Adam PA-C  [facility]astroenter[facility]  1/20/2025      Results:     Lab Results   Component Value Date    WBC 15.5 (H) 01/19/2025    HGB 8.2 (L) 01/19/2025    HCT 26.6 (L) 01/19/2025    .0 01/19/2025    CREATSERUM 0.89 01/19/2025    BUN 15 01/19/2025     01/19/2025    K 4.8 01/19/2025     01/19/2025    CO2 26.0 01/19/2025     (H) 01/19/2025    CA 9.7 01/19/2025    ALB 3.5 11/29/2024    ALKPHO 103  10/17/2024    BILT 0.2 10/17/2024    TP 7.2 10/17/2024    AST 31 11/29/2024    ALT 14 11/29/2024    PTT 27.0 01/18/2025    INR 0.94 01/17/2025    T4F 1.2 01/17/2025    TSH 0.418 (L) 01/17/2025    DDIMER 1.60 (H) 02/23/2024    ESRML >130 (H) 11/29/2024    CRP 3.00 (H) 11/29/2024    MG 1.3 (L) 01/16/2025    PHOS 3.9 06/07/2024    B12 814 11/01/2024       MRI BRAIN (CPT=70551)    Result Date: 1/20/2025  CONCLUSION:  1. Interval development of a punctate lacunar infarct of the left thalamus, concerning for acute ischemia.  2. Subacute lacunar infarcts of the left cerebellar hemisphere.   3. Postoperative changes of frontal craniotomy and parafalcine aneurysm clipping.  4. Senescent changes of parenchymal volume loss with mild sequela of chronic microvascular ischemic disease.  5. Lesser incidental findings as above.      A preliminary report was issued by the [facility] teleradiology service. There are no major discrepancies.  elm-remote.   Dictated by (CST): Sharath Gruber MD on 1/20/2025 at 9:24 AM     Finalized by (CST): Sharath Gruber MD on 1/20/2025 at 9:31 AM          XR CHEST AP PORTABLE  (CPT=71045)    Result Date: 1/20/2025  CONCLUSION:  1. Right-sided chest port in place.  2. Cardiomegaly with extensive pulmonary fibrosis.   elm-remote.   Dictated by (CST): Sharath Gruber MD on 1/20/2025 at 6:29 AM     Finalized by (CST): Sharath Gruber MD on 1/20/2025 at 6:32 AM          CT STROKE BRAIN (NO IV)(CPT=70450)    Result Date: 1/19/2025  CONCLUSION:  1. Small focus of hypoattenuation involving the left cerebellar hemisphere, which corresponds to the acute infarct demonstrated on the prior MRI brain dated 01/17/2025. 2. Otherwise, no transcortical area of hypoattenuation to suggest an acute infarct.  If there is clinical concern for an acute infarct, consider further evaluation with an MRI of the brain. 3. No acute intracranial hemorrhage, midline shift, mass effect, or hydrocephalus. 4. Redemonstrated  postoperative changes from prior frontal craniotomy for aneurysm clipping along the course of the anterior cerebral arteries.  The associated metallic artifact from the aneurysm clips limits evaluation of the adjacent structures. 5. Lesser incidental findings described above.    Impression points 1-3 were communicated via telephone to Leonidas SOW at 9:39 a.m. on 01/19/2025 by Joe Deleon MD  Dictated by (CST): Joe Deleon MD on 1/19/2025 at 9:34 AM     Finalized by (CST): Joe Deleon MD on 1/19/2025 at 9:41 AM

## 2025-01-20 NOTE — PLAN OF CARE
Aox4. NIH daily. Neurochecks q4hr, weakness noted throughout extremities intermittently. Room air. 1 assist with walker. PRN oxycodone for severe headache. No blood return per port, CXR verification of placement and unsuccessful alteplase administration. MRI brain completed, notified Neurology of preliminary results, ordered continued neurochecks q4hr. Discharge with [facility] when cleared.    Problem: NEUROLOGICAL - ADULT  Goal: Achieves stable or improved neurological status  Description: INTERVENTIONS  - Assess for and report changes in neurological status  - Initiate measures to prevent increased intracranial pressure  - Maintain blood pressure and fluid volume within ordered parameters to optimize cerebral perfusion and minimize risk of hemorrhage  - Monitor temperature, glucose, and sodium. Initiate appropriate interventions as ordered  Outcome: Progressing     Problem: PAIN - ADULT  Goal: Verbalizes/displays adequate comfort level or patient's stated pain goal  Description: INTERVENTIONS:  - Encourage pt to monitor pain and request assistance  - Assess pain using appropriate pain scale  - Administer analgesics based on type and severity of pain and evaluate response  - Implement non-pharmacological measures as appropriate and evaluate response  - Consider cultural and social influences on pain and pain management  - Manage/alleviate anxiety  - Utilize distraction and/or relaxation techniques  - Monitor for opioid side effects  - Notify MD/LIP if interventions unsuccessful or patient reports new pain  - Anticipate increased pain with activity and pre-medicate as appropriate  Outcome: Progressing

## 2025-01-20 NOTE — CM/SW NOTE
01/20/25 1100   CM/SW Referral Data   Referral Source Physician   Reason for Referral   (HH Evaluation)   Informant Patient   Medical Hx   Does patient have an established PCP? Yes  (Heike Sorto)   Patient Info   Patient's Current Mental Status at Time of Assessment Alert;Oriented   Patient's Home Environment House   Number of Levels in Home 2   Number of Stair in Home 14 each level, 1 to enter   Patient lives with Daughter  (Brea)   Patient Status Prior to Admission   Independent with ADLs and Mobility Yes   Services in place prior to admission DME/Supplies at home   Type of DME/Supplies Straight Cane;Standard Walker   Discharge Needs   Anticipated D/C needs Home health care   Choice of Post-Acute Provider   Informed patient of right to choose their preferred provider Yes   List of appropriate post-acute services provided to patient/family with quality data Yes   Patient/family choice [facility]   Information given to Patient   [facility] financial disclosure given No  (they did not accept pt's insurance)     Received MDO for HH Evaluation.  Per chart, PT worked w/ pt and Anticipated therapy need: Home with Home Healthcare  HH referrals in Aidin. F2F entered/sent.    Met w/ pt at bedside. Above assessment completed.  Verified home address and confirmed living w/ her dtr.    Pt reports a home w/ 2 levels. There are approx 14 steps between each level. There is approx 1 stoop to enter the home.    Pt reports having a cane and walker at home but not using prior to Admission.  Pt confirmed she and her dtr have discussed and she plans to use the cane/walker upon DC at home.    Pt confirmed hx w/ HH services and is agreeable to HH services again.  Discussed referral process and need for in network provider.    HH list presented at bedside - only accepting provider is [facility] at this time. Pt is agreeable to using them for HH services.    No questions at this time.    [facility]  reserved in Aidin. HH instructions added to pt's AVS.    PLAN: Home w/ [facility] - pending med clear      SW/CM to remain available for support and/or discharge planning.       MS NiharikaW, LSW [id number]

## 2025-01-20 NOTE — PAYOR COMM NOTE
&   ADMISSION REVIEW     Payor: TRINO ARREGUIN [facility]  Subscriber #:  [id number]  Authorization Number: [id number]    Admit date: 25  Admit time:  2:24 AM       REVIEW DOCUMENTATION:     ED Provider Notes        ED Provider Notes signed by Marcelle Cottrell DO at 2025 12:27 AM       Author: Marcelle Cottrell DO Service: -- Author Type: Physician    Filed: 2025 12:27 AM Date of Service: 2025  6:50 PM Status: Addendum    : Marcelle Cottrell DO (Physician)    Related Notes: Original Note by Marcelle Cottrell DO (Physician) filed at 2025 12:19 AM         [place] Emergency Department Note  Patient: Chester Bautista Age: 84 year old Sex: female      MRN: [id number]  : 1940    Patient Seen in: [facility] Emergency Department    History   No chief complaint on file.    Stated Complaint: dizziness, right arm tingling    History obtained from: patient     This is a very pleasant 84-year-old female history of COPD, diabetes, hypertension, RA, prior AVMs and blood transfusion secondary to GI bleeding, anemia, presents to the ER due to sudden onset room spinning dizziness and feeling off balance as well as tingling in her right fingertips.  This all started at approximately 4:30 PM while she was sitting at home.  States she is occasionally had a cough over the past few days that is nonproductive but denies any chest pain or shortness of breath, no back pain.  She reports some mild pain behind her eyes and states this feels similar to when she had a brain aneurysm rupture many years ago.  Reports 2 associated episodes of nonbloody emesis associated with dizziness today.  Denies any worst of life/thunderclap headache.  Denies any vision changes or loss of vision.  No slurred speech.  Besides tingling in her right hand denies any new numbness weakness or tingling her extremities.  Denies abdominal pain.  She denies diarrhea.  No fevers.  No leg swelling.    Review of Systems:  Review of  Systems  Positive for stated complaint: dizziness, right arm tingling. Constitutional and vital signs reviewed. All other systems reviewed and negative except as noted above.    Patient History:  Past Medical History:    Anxiety state    Cancer (HCC)    breast cancer 2018    Cerebellar stroke (HCC)    Chronic obstructive pulmonary disease, unspecified (HCC)    Chronic obstructive pulmonary disease, unspecified (HCC)    COPD (chronic obstructive pulmonary disease) (HCC)    Cystic thyroid nodule    Diabetes (HCC)    Diabetes mellitus (HCC)    Essential hypertension    Exposure to medical diagnostic radiation    High blood pressure    High cholesterol    History of blood transfusion    low hemoglobin    Osteoarthritis    PONV (postoperative nausea and vomiting)    Pulmonary embolism (HCC)    Pulmonary emphysema (HCC)    Rheumatoid arthritis (HCC)       Past Surgical History:   Procedure Laterality Date    Cataract extraction w/  intraocular lens implant Bilateral 2017    Dr in [facility]     Colonoscopy      Colonoscopy N/A 07/21/2022    Procedure: COLONOSCOPY;  Surgeon: Mikey Garcia MD;  Location: [facility] ENDOSCOPY    Colonoscopy N/A 06/15/2023    Procedure: COLONOSCOPY;  Surgeon: Mikey Garcia MD;  Location: [facility] ENDOSCOPY    Correct bunion,simple      right foot  1993    Egd  12/21/2023    Dr. Garcia; Duodenal AVM's x4 cauterized    Hysterectomy      1972, no abnormal Paps, due to heavy bleeding with fibroids.  Not sure if it had bilateral salpingo-oophorectomy.    Ir aneurysm repairm  2010    Computer assisted volumetric craniofomy with clipping of anterior cerebral artery.    Lumpectomy right      Radiation right      Rotator cuff repair      1993    Total knee replacement Right 2010    Total knee replacement Left 07/02/2015    Transoral incisionless fundoplication - internal  01/25/2012 Fredy fundoplication at [facility] Dr. Fournier.    Fredy fundoplication at [facility] Dr. Fournier.     Trigger finger release      left hand      Yag capsulotomy - ou - both eyes Bilateral 2021    done in [place]        Family History   Problem Relation Age of Onset    Heart Surgery Mother         Leaky valve at 41 y/o.     Prostate Cancer Brother     Cancer Brother     Diabetes Maternal Grandmother     Diabetes Paternal Aunt     Breast Cancer Self 78    Breast Cancer Niece         before 50    Macular degeneration Neg     Glaucoma Neg        Specific Social Determinants of Health:   Social History     Socioeconomic History    Marital status:    Tobacco Use    Smoking status: Former     Current packs/day: 0.00     Types: Cigarettes     Quit date:      Years since quittin.0     Passive exposure: Never    Smokeless tobacco: Never    Tobacco comments:     [id number]   Vaping Use    Vaping status: Never Used   Substance and Sexual Activity    Alcohol use: Never    Drug use: Never   Social History Narrative    Just moved in from [place].   passed away and that is why moved from [place] to be with daughter who lives in [place].  Currently lives with daughter.     Social Drivers of Health     Financial Resource Strain: Low Risk  (10/22/2024)    Financial Resource Strain     Difficulty of Paying Living Expenses: Not very hard     Med Affordability: No   Food Insecurity: No Food Insecurity (2024)    NCSS - Food Insecurity     Worried About Running Out of Food in the Last Year: No     Ran Out of Food in the Last Year: No   Transportation Needs: No Transportation Needs (2024)    NCSS - Transportation     Lack of Transportation: No   Housing Stability: Not At Risk (2024)    NCSS - Housing/Utilities     Has Housing: Yes     Worried About Losing Housing: No     Unable to Get Utilities: No           PSFH elements reviewed from today and agreed except as otherwise stated in HPI.    Physical Exam     ED Triage Vitals [25 1834]   BP (!) 177/83   Pulse 88   Resp 20    Temp 97.7 °F (36.5 °C)   Temp src Oral   SpO2 93 %   O2 Device None (Room air)       Current:BP (!) 166/86   Pulse 85   Temp 97.7 °F (36.5 °C) (Oral)   Resp 18   Ht 154.9 cm (5' 1\")   Wt 67.1 kg   SpO2 93%   BMI 27.96 kg/m²         Physical Exam  Vitals and nursing note reviewed.   Constitutional:       General: She is not in acute distress.     Appearance: She is not ill-appearing.   HENT:      Head: Normocephalic and atraumatic.      Right Ear: External ear normal.      Left Ear: External ear normal.      Nose: Nose normal.      Mouth/Throat:      Mouth: Mucous membranes are moist.      Pharynx: Oropharynx is clear.   Eyes:      Extraocular Movements: Extraocular movements intact.      Conjunctiva/sclera: Conjunctivae normal.      Pupils: Pupils are equal, round, and reactive to light.   Cardiovascular:      Rate and Rhythm: Normal rate and regular rhythm.      Heart sounds: No murmur heard.     Comments: +2 radial and DP pulses bilaterally  Pulmonary:      Effort: Pulmonary effort is normal. No respiratory distress.      Breath sounds: No wheezing or rales.   Abdominal:      General: There is no distension.      Palpations: Abdomen is soft.      Tenderness: There is no abdominal tenderness. There is no guarding or rebound.   Musculoskeletal:         General: No deformity.      Cervical back: Neck supple.      Right lower leg: No edema.      Left lower leg: No edema.   Skin:     General: Skin is warm and dry.      Capillary Refill: Capillary refill takes less than 2 seconds.   Neurological:      General: No focal deficit present.      Mental Status: She is alert and oriented to person, place, and time.      Cranial Nerves: No cranial nerve deficit.      Comments: Strength is 5/5 bilateral upper extremities on handgrip, elbow flexion/extension, shoulder lateral abduction/adduction, bilateral lower extremities on hip flexion and ankle plantar/dorsiflexion. SILT bilat UE and LE throughout and symmetric.  Finger to nose intact bilaterally.  No pronator drift in arms or legs.     Psychiatric:         Mood and Affect: Mood normal.         ED Course   Labs:   Labs Reviewed   BASIC METABOLIC PANEL (8) - Abnormal; Notable for the following components:       Result Value    Glucose 229 (*)     All other components within normal limits   CBC WITH DIFFERENTIAL WITH PLATELET - Abnormal; Notable for the following components:    WBC 11.2 (*)     HGB 9.2 (*)     HCT 30.7 (*)     MCV 79.3 (*)     MCH 23.8 (*)     MCHC 30.0 (*)     RDW-SD 50.4 (*)     RDW 17.4 (*)     Neutrophil Absolute Prelim 9.35 (*)     Neutrophil Absolute 9.35 (*)     All other components within normal limits   URINALYSIS WITH CULTURE REFLEX - Abnormal; Notable for the following components:    Glucose Urine 100 (*)     Protein Urine 70 (*)     Leukocyte Esterase Urine 250 (*)     WBC Urine 21-50 (*)     Bacteria Urine Rare (*)     Squamous Epi. Cells Few (*)     All other components within normal limits   TSH W REFLEX TO FREE T4 - Abnormal; Notable for the following components:    TSH 0.418 (*)     All other components within normal limits   POCT GLUCOSE - Abnormal; Notable for the following components:    POC Glucose  282 (*)     All other components within normal limits   TROPONIN I HIGH SENSITIVITY - Normal   PTT, ACTIVATED - Normal   PROTHROMBIN TIME (PT) - Normal   T4, FREE (S) - Normal   FREE T3 (TRIIODOTHYRONINE) - Normal   SARS-COV-2/FLU A AND B/RSV BY PCR (GENEXPERT) - Normal    Narrative:     This test is intended for the qualitative detection and differentiation of SARS-CoV-2, influenza A, influenza B, and respiratory syncytial virus (RSV) viral RNA in nasopharyngeal or nares swabs from individuals suspected of respiratory viral infection consistent with COVID-19 by their healthcare provider. Signs and symptoms of respiratory viral infection due to SARS-CoV-2, influenza, and RSV can be similar.    Test performed using the Xpert Xpress  SARS-CoV-2/FLU/RSV (real time RT-PCR)  assay on the GeneXpert instrument, [facility], [place] [address].   This test is being used under the Food and Drug Administration's Emergency Use Authorization.    The authorized Fact Sheet for Healthcare Providers for this assay is available upon request from the laboratory.   URINE CULTURE, ROUTINE     Radiology findings:    CTA CHEST+CTA ABDOMEN DISSECT SET (CPT=71275/83110)    Result Date: 1/17/2025  CONCLUSION:  1. Acute pulmonary emboli involving the bilateral lower lobe subsegmental pulmonary artery branches.  2. No evidence of aortic dissection.  3. Ascending thoracic aortic ectasia. Multifocal irregular partially ulcerated peripheral mural thrombus is observed throughout the thoracic and abdominal aorta.   4. Dilatation of the main pulmonary artery trunk may relate to underlying pulmonary hypertension.   5. Moderate to severe centrilobular/paraseptal emphysema with extensive postradiation fibrosis and chronic opacity of the right middle lobe.  6. Multifocal pleuroparenchymal scarring is apparent.  7. Proximal distal colonic diverticulosis without CT evidence of acute complication in the imaged volume.  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  9. Lesser incidental findings as above.    Results of this examination were discussed with the patient's physician, Dr. Cottrell, by Dr. Gruber at 2213 on 01/17/2025.   Dictated by (CST): Sharath Gruber MD on 1/17/2025 at 10:10 PM     Finalized by (CST): Sharath Gruber MD on 1/17/2025 at 10:22 PM          XR CHEST AP PORTABLE  (CPT=71045)    Result Date: 1/17/2025  CONCLUSION:  1. Unchanged chronic fibrotic changes throughout the lungs.  2. Cardiomegaly.    Dictated by (CST): Sharath Gruber MD on 1/17/2025 at 7:40 PM     Finalized by (CST): Sharath Gruber MD on 1/17/2025 at 7:42 PM          CT STROKE CTA BRAIN/CTA NECK (W IV)(CPT=70496/74870)    Result Date: 1/17/2025  CONCLUSION:  1. Negative for hemodynamically  significant stenosis or occlusion of the anterior or posterior circulations.   2. The internal carotid arteries demonstrate tortuosity proximally without evidence of hemodynamically stenosis or occlusion.  3. The vertebral arteries demonstrate contiguous patency bilaterally without evidence of flow-limiting stenosis.  4. 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 Thrombus extends into the proximal left subclavian artery origin, contributing to luminal narrowing.  5. Dilatation of the main pulmonary artery trunk may relate to underlying pulmonary hypertension.  6. 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   7. Lesser incidental findings as above.   Dictated by (CST): Sharath Gruber MD on 1/17/2025 at 7:27 PM     Finalized by (CST): Sharath Gruber MD on 1/17/2025 at 7:36 PM          CT STROKE BRAIN (NO IV)(CPT=70450)    Result Date: 1/17/2025  CONCLUSION:  1. No acute intracranial hemorrhage or evidence of extended large vessel infarction. If there is clinical concern for acute ischemia, follow-up MRI suggested.   2. Postoperative changes of frontal craniotomy with anterior parafalcine aneurysm clipping.  3. Senescent changes of parenchymal volume loss with sequela of chronic microvascular ischemic disease.  4. There is large vessel atherosclerosis of the anterior and posterior intracranial circulations.  5. Lesser incidental findings as above.    This report was called immediately at 1915 hours to Emergency Department Pod 3 and discussed with the patient's ER physician, Dr. Cottrell.    Dictated by (CST): Sharath Gruber MD on 1/17/2025 at 7:11 PM     Finalized by (CST): Sharath Gruber MD on 1/17/2025 at 7:15 PM           EKG as interpreted by me: My interpretation of EKG shows normal sinus rhythm rate 84 beats minute, minimal voltage criteria for LVH, no STEMI.  QTc 449 ms.  When compared to prior EKG from October 2022, no significant changes.  Cardiac Monitor: Interpreted by  me.   Pulse Readings from Last 1 Encounters:   01/17/25 85   , sinus,         MDM   This patient presents with sudden onset room spinning dizziness this afternoon with tingling in her right fingertips.  Patient hemodynamically stable, hypertensive on arrival.  No focal reproducible neurologic deficits or cerebellar signs on exam.    Differential diagnoses considered includes, but is not limited to:   Cerebellar stroke  Arterial dissection  Labyrinthitis or vertigo  Electrolyte abnormality  Atypical ACS, less likely given lack of any chest pain, shortness of breath.  Low suspicion for arterial dissection given lack of any pulse deficits, chest pain or back pain.    TNK/ NI Documentation:    Date/Time last known well:   1/17/2025 3:00 PM    NIHSS on presentation: 0     No chief complaint on file.    IV Tenecteplase (TNK) administered: No; Patient is not a Candidate for IV TNK due to: Mild nondisabling symptoms or rapidly improving symptoms, History of Intracranial hemorrhage, and history of GI bleed    Candidate for Endovascular thrombectomy (EVT): No; Patient is not a candidate for Endovascular Thrombectomy due to: No large vessel occlusion ( LVO)  on CTA/MRA imaging          Will obtain the following tests: ct head, cta head and neck, cbc, cmp, trop, ecg, cxr, rapid MRI brain   Will administer the following medications/therapies: meclizine     Please see ED course for my independent review of these tests/imaging results.        ED Course as of 01/18/25 0027  ------------------------------------------------------------  Time: 01/17 1908  Comment: On my independent interpretation of patient's head CT, no brain bleed  ------------------------------------------------------------  Time: 01/17 1910  Comment: Case has been discussed with neurology Dr. Ronald March agrees pt is not a candidate for TNK given her exam and history. Will proceed with CT/Cta and rapid MRI. If negative will plan for outpatient follow up and hold off  on antiplatelet agents given her history of GIB.   ------------------------------------------------------------  Time: 01/17 1938  Value: CT STROKE CTA BRAIN/CTA NECK (W IV)(CPT=70496/02123)  Comment:   Impression  CONCLUSION:  1. Negative for hemodynamically significant stenosis or occlusion of the anterior or posterior circulations.       2. The internal carotid arteries demonstrate tortuosity proximally without evidence of hemodynamically stenosis or occlusion.     3. The vertebral arteries demonstrate contiguous patency bilaterally without evidence of flow-limiting stenosis.     4. 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 Thrombus extends into the proximal left subclavian artery origin, contributing to luminal narrowing.     5. Dilatation of the main pulmonary artery trunk may relate to underlying pulmonary hypertension.     6. 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       7. Lesser incidental findings as above.    ------------------------------------------------------------  Time: 01/17 1947  Value: XR CHEST AP PORTABLE  (CPT=71045)  Comment: FINDINGS:  POSITION: The patient is slightly rotated to the left.  DEVICES: There is a right-sided Port-A-Cath with tip projecting near the cavoatrial junction.  CARDIAC/VASC: The cardiomediastinal silhouette is accentuated by AP technique, but likely stably enlarged. There is mild tortuosity of the thoracic aorta with peripheral atherosclerotic vascular calcification. The pulmonary vascularity is within normal  limits.  MEDIAST/DYLON: No visible mass or adenopathy.  LUNGS/PLEURA: The lungs are hyperexpanded. There are relative lucencies of the upper lobes bilaterally, and coarsened bronchovascular markings are apparent. Extensive interstitial opacities are demonstrated throughout the lungs bilaterally. Fibrotic  changes and architectural distortion are re-identified. No airspace consolidation, pleural effusion, or pneumothorax is  evident.    BONES: Mild scoliosis and multilevel degenerative changes of the thoracic spine are apparent. There are degenerative changes of shoulders bilaterally; there is superior migration of the right humeral head with narrowing of the right acromiohumeral  interval, suggesting chronic full-thickness rotator cuff tear.  OTHER: Leads overlie the chest and obscure underlying detail.                   Impression  CONCLUSION:  1. Unchanged chronic fibrotic changes throughout the lungs.     2. Cardiomegaly.    ------------------------------------------------------------  Time: 01/17 2034  Value: Hemoglobin(!): 9.2  Comment: (Reviewed)  ------------------------------------------------------------  Time: 01/17 2034  Comment: Trop normal. Coags wnl. Bmp unremarkable.   ------------------------------------------------------------  Time: 01/17 2129  Comment: UA borderline UTI. Will give bactrim   ------------------------------------------------------------  Time: 01/17 2212  Comment: Small subsegmental PE on CTA. States irregular thrombus is chronic for patient not new.   ------------------------------------------------------------  Time: 01/17 2230  Comment: Impression  CONCLUSION:  1. Acute pulmonary emboli involving the bilateral lower lobe subsegmental pulmonary artery branches.     2. No evidence of aortic dissection.     3. Ascending thoracic aortic ectasia. Multifocal irregular partially ulcerated peripheral mural thrombus is observed throughout the thoracic and abdominal aorta.       4. Dilatation of the main pulmonary artery trunk may relate to underlying pulmonary hypertension.      5. Moderate to severe centrilobular/paraseptal emphysema with extensive postradiation fibrosis and chronic opacity of the right middle lobe.     6. Multifocal pleuroparenchymal scarring is apparent.     7. Proximal distal colonic diverticulosis without CT evidence of acute complication in the imaged volume.     8. Duplication of the  right renal collecting system.     9. Lesser incidental findings as above.    ------------------------------------------------------------  Time: 01/17 2245  Comment: MRI brain without contrast      IMPRESSION:    There are 2 foci of restricted diffusion in the left cerebellum measuring up to 6 mm, consistent with small infarcts.  No mass effect, hydrocephalus, or midline shift.  Mild diffuse deep white matter changes, consistent with chronic small vessel ischemic disease.  Surgical clips in the anterior cranial fossa create artifact limiting visualization of the frontal lobes.    ------------------------------------------------------------  Time: 01/17 0039  Comment: Discussed case with hospitalist and neurology, recommend 81 mg asa and hold off on heparin for now. Will tx uti with cephalexin as well. Hospitalist would like to hold asa for now pending neuro eval and vascular eval in [place] per Dr. Agrawal nothing to do acutely for chronic mural thrombus can follow up outpatient             Procedures:  Procedures      Due to a high probability of clinically significant, life threatening deterioration, the patient required my highest level of preparedness to intervene emergently and I personally spent 50 minutes of critical care time directly and personally managing the patient. This critical care time included obtaining a history; examining the patient; pulse oximetry; ordering and review of studies; arranging urgent treatment with development of a management plan; evaluation of patient's response to treatment; frequent reassessment; and, discussions with other providers.    This critical care time was performed to assess and manage the high probability of imminent, life-threatening deterioration that could result in multi-organ failure. It was exclusive of separately billable procedures and treating other patients and teaching time.    Disposition and Plan     Clinical Impression:  1. Cerebellar stroke (HCC)    2.  Dizziness    3. Right hand paresthesia    4. Thrombus of aorta (HCC)    5. Bilateral pulmonary embolism (HCC)    6. Acute cystitis without hematuria        Disposition:  Admit          Hospital Problems       Present on Admission  Date Reviewed: 1/16/2025            ICD-10-CM Noted POA    * (Principal) Cerebellar stroke (HCC) I63.9 1/17/2025 Unknown        This note may have been created using voice dictation technology and may include inadvertent errors.      Marcelle Cottrell DO  Attending Physician   Emergency Medicine           Signed by Marcelle Cottrell DO on 1/18/2025 12:27 AM         MEDICATIONS ADMINISTERED IN LAST 1 DAY:  alteplase (Activase) 2 mg in sterile water for injection (PF) 2.2 mL IV push to declot line       Date Action Dose Route User    1/20/2025 0138 Given 2 mg Intravenous Christine Rosario RN          carvedilol (Coreg) tab 12.5 mg       Date Action Dose Route User    1/20/2025 0829 Given 12.5 mg Oral Nelia Sauer RN    1/19/2025 1758 Given 12.5 mg Oral Thao Lauren RN          ceFEPIme (Maxipime) 1 g in sodium chloride 0.9% 100 mL IVPB-MBP       Date Action Dose Route User    1/20/2025 0951 New Bag 1 g Intravenous Nelia Sauer RN          cetirizine (ZyrTEC) tab 10 mg       Date Action Dose Route User    1/20/2025 0828 Given 10 mg Oral Nelia Sauer RN          clopidogrel (Plavix) tab 75 mg       Date Action Dose Route User    1/20/2025 0828 Given 75 mg Oral Nelia Sauer RN          clopidogrel (Plavix) tab 150 mg       Date Action Dose Route User    1/20/2025 0746 Given 150 mg Oral Christine Rosario RN          cyclobenzaprine (Flexeril) tab 10 mg       Date Action Dose Route User    1/19/2025 2145 Given 10 mg Oral Christine Rosario RN          docusate sodium (Colace) cap 100 mg       Date Action Dose Route User    1/20/2025 1202 Given 100 mg Oral Nelia Sauer RN          famotidine (Pepcid) tab 20 mg       Date Action Dose Route User    1/20/2025 0829 Given 20 mg Oral  Nelia Sauer RN    1/19/2025 2142 Given 20 mg Oral Christine Rosario RN          fluticasone-salmeterol (Advair Diskus) 250-50 MCG/ACT inhaler 1 puff       Date Action Dose Route User    1/20/2025 0830 Given 1 puff Inhalation Nelia Sauer RN    1/19/2025 2146 Given 1 puff Inhalation Christine Rosario RN          gabapentin (Neurontin) cap 100 mg       Date Action Dose Route User    1/19/2025 2142 Given 100 mg Oral Christine Rosario RN          hydrALAzine (Apresoline) 20 mg/mL injection 10 mg       Date Action Dose Route User    1/19/2025 2145 Given 10 mg Intravenous Christine Rosario RN          hydroxychloroquine (Plaquenil) tab 300 mg       Date Action Dose Route User    1/20/2025 0833 Given 300 mg Oral Nelia Sauer RN          insulin aspart (NovoLOG) 100 Units/mL FlexPen 1-11 Units       Date Action Dose Route User    1/20/2025 1329 Given 3 Units Subcutaneous (Left Upper Arm) Nelia Sauer RN    1/20/2025 0957 Given 4 Units Subcutaneous (Left Upper Arm) Nelia Sauer RN    1/19/2025 1754 Given 3 Units Subcutaneous (Right Upper Arm) Thao Lauren RN          insulin degludec (Tresiba) 100 units/mL flextouch 10 Units       Date Action Dose Route User    1/19/2025 2145 Given 10 Units Subcutaneous (Left Upper Arm) Christine Rosario RN          losartan (Cozaar) tab 50 mg       Date Action Dose Route User    1/20/2025 0829 Given 50 mg Oral Nelia Sauer RN          montelukast (Singulair) tab 10 mg       Date Action Dose Route User    1/19/2025 2142 Given 10 mg Oral Christine Rosario RN          nitrofurantoin monohydrate macro (Macrobid) cap 100 mg       Date Action Dose Route User    1/20/2025 0828 Given 100 mg Oral Nelia Sauer RN    1/19/2025 1758 Given 100 mg Oral Thao Lauren RN          oxyCODONE immediate release tab 5 mg       Date Action Dose Route User    1/20/2025 1202 Given 5 mg Oral Nelia Sauer RN    1/19/2025 2356 Given 5 mg Oral Christine Rosario, MAGI           predniSONE (Deltasone) tab 5 mg       Date Action Dose Route User    1/20/2025 0829 Given 5 mg Oral Nelia Sauer, MAGI          rosuvastatin (Crestor) tab 20 mg       Date Action Dose Route User    1/19/2025 2142 Given 20 mg Oral Christine Rosario, RN            Vitals (last day)       Date/Time Temp Pulse Resp BP SpO2 Weight O2 Device O2 Flow Rate (L/min) [facility]    01/20/25 1200 98.4 °F (36.9 °C) 99 16 140/64 94 % -- None (Room air) --     01/20/25 1000 -- 101 -- 145/63 -- -- -- --     01/20/25 0800 98.2 °F (36.8 °C) 108 17 153/72 94 % -- None (Room air) --     01/20/25 0400 -- -- -- -- -- 136 lb 8 oz (61.9 kg) -- -- LR    01/20/25 0400 98.4 °F (36.9 °C) 106 16 151/74 92 % -- None (Room air) -- ML    01/20/25 0138 -- 100 -- -- -- -- -- -- CY    01/20/25 0045 98.2 °F (36.8 °C) 97 16 144/74 92 % -- None (Room air) -- ML    01/19/25 2100 98.4 °F (36.9 °C) 89 16 157/79 92 % -- None (Room air) -- ML    01/19/25 1900 -- 97 -- -- -- -- -- -- CY    01/19/25 1753 98.2 °F (36.8 °C) 90 16 151/65 99 % -- None (Room air) -- A    01/19/25 1355 98.6 °F (37 °C) 87 16 150/73 98 % -- None (Room air) -- A    01/19/25 0903 98.4 °F (36.9 °C) 106 16 149/62 92 % -- None (Room air) -- A    01/19/25 0640 -- -- -- 170/74 95 % -- None (Room air) -- LT    01/19/25 0412 -- -- -- -- -- 138 lb (62.6 kg) -- -- LR    01/19/25 0412 98.2 °F (36.8 °C) 95 18 175/68 93 % -- None (Room air) -- LT    01/19/25 0333 -- 94 -- -- -- -- -- -- CY       H&P       Subjective:  Chester Bautista is a 84 year old female with HTN, HLD, DM, COPD, arthritis, hx of prior DVT PE s/p IVC filter, and Anemia who presented with room spinning since 1400 today.  States was sitting on couch talking on the phone when the dizziness started, with associated emesis X1.  Thereafter she started having some right shoulder pain and right arm tingling.  Daughter at bedside states mom can do basic household tasks however is otherwise sedentary laying on bed.  Last  month on  family had a long car ride to [place] to attend a .                 In ED initial /83 with WBC 11.2 with left shift, UA with signs of UTI.  Stroke alert called, found to have 2 small cerebellar infarcts on her MRI and large mural thrombus in descending aorta, per radiology it is chronic though patient stated she was not aware of it. Also found to have small subsegmental PEs on CTA Chest. She took 3 baby aspirins prior to arrival. Otherwise she is not on any anticoagulation at home due to prior severe bleeding and GI AVMs. Neurology and vascular surgery consulted. Patient given meclizine for dizziness, and labetalol for HTN.  Admitted for further management.           Assessment & Plan:  #Left Cerebellar Infarcts  - pt with new sudden onset neuro changes  - 2 small cerebellar infarcts seen on admission MRI  -Neurology consulted  -Patient already took 3 baby aspirin's prior to coming to ED   -Neurology consulted  - CTA negative for LVO, but showing small subsegmental Pes  -f/u lipid panel, A1c, TSH, ECHO   -Continue statin  -PT/OT/ST eval     #Dizziness  -Likely in setting of above  -Meclizine prn   -Neurology consulted     #PE   -Patient is hemodynamically stable, EKG NSR and troponin not elevated.  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 Already took 3 baby aspirins prior to admission, will hold off further AC for now in setting of prior Anemia and GI AVMs   -Previously had IVC filter placed in 2024 for PE at that time (removed 2024).   -IR consult for IVC eval again   -Needs eval for hypercoagulable state. Risk factors of recent car ride, overall sedentary state, breast cancer. Sees Dr. Mohamud Hem in clinic for anemia, needs further follow up      #Descending aortic arch thrombus   -Admission CTA chest revealing mural thrombus throughout the thoracic and abdominal aorta, chronic per radiology  -Vascular  surgery consulted      #HTN  -Hold home antihypertensives to allow for permissive hypertension poststroke     #UTI  - nitrofurantoin initiated in ED, continue  -Follow-up urine cultures      1/18 NEUROLOGY CONSULT NOTE    Date of Admission:  1/17/2025  Date of Consult:  1/18/2025  Reason for Consultation:   Acute stroke     History of Present Illness:   Patient is a 84 year old female who was admitted to the hospital for Cerebellar stroke (HCC):     I am seeing her for the first time today, have not seen her previously.  Her daughter at bedside help with the history.  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  She continues to get iron infusions intermittently and required to units of packed RBCs transfusion since 2021.  Her duodenal bleeding improved on octreotide injections     She told me she was in her usual state of health yesterday at 3 PM when she suddenly developed a vertigo sensation while she was sitting in her couch for few minutes then it got extremely severe and she felt the whole room was spinning around her.  She later developed some pain in her right upper extremity and numbness and weakness.  Her vertigo gradually resolved but then she ended up vomiting after the event.     Upon arrival to the emergency department an MRI of the brain was obtained that revealed a small cerebellar stroke.  See images below.   Neurology consulted for the same.     She told me her symptoms almost completely resolved today, she continues to experience only minimal tingling in her right hand.  No vertigo.     She had a CT angiogram of the head and neck while she was in the emergency department that did not reveal any hemodynamically significant stenosis in her intracranial vessels or in her internal carotid arteries bilaterally.  There was however an extensive irregular thrombus of the aortic arch that extends into the  proximal left subclavian artery origin     Told me she has been off aspirin since her duodenal bleeding began.  Has not been on Plavix in the past     Also has been prescribed rosuvastatin 20 mg and dose was recently increased to 40 mg daily          Physical Exam:             Vitals:     01/18/25 0432 01/18/25 0600 01/18/25 1009 01/18/25 1010   BP: (!) 178/84 (!) 173/85 156/67     Pulse:   79 86     Resp:   20 18     Temp:   97.6 °F (36.4 °C) 97.7 °F (36.5 °C)     TempSrc:   Oral Oral     SpO2:   94% (!) 83% 95%   Weight:           Height:                 General: No apparent distress, well nourished, well groomed.  Head- Normocephalic, atraumatic        Neurological:      Mental Status- Alert and oriented x3.  Normal attention span and concentration  Thought process intact        Language intact including: comprehension, naming, repetition, vocabulary     Cranial Nerves:     III, IV, VI- EOM intact, SUMAYA  V. Facial sensation intact  VII. Face symmetric, no facial weakness  XI. Shoulder shrug is intact  XII. Tongue is midline     Motor Exam:  Muscle tone normal  No atrophy or fasciculations  Strength- upper extremities 5/5 proximally and distally                  - lower  extremities 5/5 proximally and distally     Sensory Exam:  Light touch sensation- intact in all 4 extremities     Deep Tendon Reflexes:  2+ and symmetric        Coordination:  Finger to nose intact     Impression:     Cerebellar stroke (HCC)  MRI of the brain, attached above revealed an acute stroke in the left cerebellum-Symptoms improving  CT angiogram of the head and neck revealed 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  She has a history of AVMs in her duodenum that have been cauterized, maintained on octreotide injections requiring iron transfusions and rarely blood transfusions  Echocardiogram with ejection fraction  of 55%, mild hypokinesis of the mid anterolateral wall.  No significant change from previous echocardiogram     Recommendations:  1-discussed at length with her and her daughter the risk of antiplatelets, she has an irregular thrombus of the aortic arch  2-I told her I suggest Plavix 75 mg daily, she understands risk of bleeding from duodenal ulcers and AVMs would increase with any antiplatelet or anticoagulation.  But her risk of further strokes is higher now given findings in her aortic arch.  Discussed with hospitalist Dr. Guerra, it may be appropriate to involve GI service in decision to starting antiplatelets  3-continue high-dose rosuvastatin at 40 mg daily  4-ultrasound lower extremities negative for DVT  5-PT/OT/ST  6-A1c pending, lipid panel with LDL of 85  7-follow-up in outpatient neurology clinic in 4 to 6 weeks    1/18 HOSPITALIST NOTE       Subjective:   Chester Bautista is a(n) 84 year old female was seen and examined  No acute events overnight  Resting in bed, comfortably   Daughter at bedside   No cp, sob, f,c,n,v, abd pain or HA      Objective:   Blood pressure 157/69, pulse 80, temperature 97.7 °F (36.5 °C), temperature source Oral, resp. rate 18, height 5' 1\" (1.549 m), weight 136 lb 12.8 oz (62.1 kg), SpO2 95%.      Assessment and Plan:   #Left Cerebellar Infarcts  - pt with new sudden onset neuro changes  - 2 small cerebellar infarcts seen on admission MRI  -Neurology consulted  -Patient already took 3 baby aspirin's prior to coming to ED   -Neurology consulted  - CTA negative for LVO, but showing small subsegmental Pes  -f/u lipid panel, A1c, TSH, ECHO   -Continue statin, will add plavix  -PT/OT/ST eval     #Dizziness  Improved   -Likely in setting of above  -Meclizine prn   -Neurology consulted     #PE   -Patient is hemodynamically stable, EKG NSR and troponin not elevated.  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  Already took 3 baby aspirins prior to admission, will hold off further AC for now in setting of prior Anemia and GI AVMs   -Previously had IVC filter placed in March 2024 for PE at that time (removed June 2024).   -IR consult for IVC eval again   -Needs eval for hypercoagulable state. Risk factors of recent car ride, overall sedentary state, breast cancer. Sees Dr. Cade Chu in clinic for anemia, needs further follow up      #Descending aortic arch thrombus   -Admission CTA chest revealing mural thrombus throughout the thoracic and abdominal aorta, chronic per radiology  -Vascular surgery consulted   -cont plavix and statin for now      #HTN  -Hold home antihypertensives to allow for permissive hypertension poststroke     #UTI  - nitrofurantoin initiated in ED, continue  -Follow-up urine cultures        Chronic Medical Problems  HTN  HLD  DM  COPD/?sarcoidosis   Rheumatoid Arthritis  DVT/PE s/p IVC which has been removed   CKD3  Anemia - in setting of bleeding AVMs; getting iron infusions   Hx of Breast Cancer      DVT Ppx: SCDs  Full code  MDM: High      1/18 GI NOTE CONSULT    Reason for Consultation:   Chronic obscure GI bleeding and chronic blood loss anemia; acute cerebellar CVA event possible thrombotic event     History of Present Illness:      Chester Bautista is a BEAUTIFUL and articulate 84 year old woman with Body mass index is 25.85 kg/m². history Type 2 diabetes, hypertension, ?pulm HTN?,  rheumatoid arthritis, COPD; previous history frontal craniotomy with anterior parafalcine aneurysm clipping; multiple pulmonary emboli on previous CT scan 2/23/2024; well known to our service due to previous concern for occult GI bleeding and chronic blood loss anemia, 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     Tonight, she denies ever seeing wild melena or hematochezia with the occult GI bleeding.  She describes repeatedly becoming aware of worsening fatigue and dyspnea, found to have  anemia on outpatient labs.     Well-known to Dr. Mikey Garcia and more recently Zoila Mcqueen of our group for known chronic occult GI bleeding as per procedures below.     This time, Ms. Bautista was admitted for neurologic event.  She presented to the ED last night 1/17/2025 reporting dizziness/room spinning, numbness and tingling in right arm, feeling off balance.     Neurologic deficits appear to be improving.     Took aspirin on the way to the ED.     Seen by Neurology service, started Plavix anticoagulation today 1/18/2024 approximately 1300.     Also on atorvastatin lipid-lowering therapy, Carvedilol; Plaquenil/hydroxychloroquine.     As below, started octreotide  (Sandostatin LAR) IM injection 20 mg monthly mid March 2024       Impression:      84 year old woman with Body mass index is 25.85 kg/m². history Type 2 diabetes, hypertension, ?pulm HTN?,  rheumatoid arthritis, COPD; previous history frontal craniotomy with anterior parafalcine aneurysm clipping; multiple pulmonary emboli on previous CT scan 2/23/2024; well known to our service due to previous concern for occult GI bleeding and chronic blood loss anemia, 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     Tonight, she denies ever seeing wild melena or hematochezia with the occult GI bleeding.  She describes repeatedly becoming aware of worsening fatigue and dyspnea, found to have anemia on outpatient labs.     Well-known to Dr. Mikey Garcia and more recently Zoila Mcqueen of our group for known chronic occult GI bleeding as per procedures below.     This time, Ms. Bautista was admitted for neurologic event.  She presented to the ED last night 1/17/2025 reporting dizziness/room spinning, numbness and tingling in right arm, feeling off balance.     Workup showing \"extensive irregular thrombus of aortic arch\" possible embolic source; 2 acute lacunar infarctions of left cerebellum     Plan is to start Plavix anticoagulation.     Recommendations:  GI  service has been consulted regarding the issue of starting Plavix anticoagulation in the setting of known chronic obscure GI bleeding, chronic blood loss anemia due to gastric, small bowel, colonic AVM lesions  Today I recommended to Ms. Bautista proceeding with trial of the Plavix anticoagulation while closely monitoring CBC inpatient and outpatient basis.  She appears to be at high risk for further embolic CVA events.  May require ?weekly CBC and iron studies next few months  As per operative notes above, these recurring AVM lesions do not appear amenable to endoscopy and ablation treatments.  Ms. Bautista just started octreotide Sandostatin LAR monthly injections about 9 months ago which appear to be helping.      1/18 VASCULAR SURGERY CONSULT NOTE  Reason for Consultation:   Left cerebral infarct, left vertebral artery origin stenosis in thrombus.     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  Patient explains that she was in her usual state of health when acutely she started to have severe vertigo.  She subsequently developed right upper extremity numbness and weakness, however by the time of the arrival to the emergency room all of her symptoms were almost completely resolved.  MRI of the brain performed in the emergency room was concerning for small left cerebellar stroke.  CT angio of the head and neck was notable for bilateral antegrade vertebral artery flow however there was mild to moderate stenosis at the origin of the left vertebral artery and there was a thrombus in the aortic arch encroaching to the origin of the left subclavian artery.  Vascular surgery was consulted for further evaluation and recommendations.          Impression:   84 year old female with past medical history of COPD, DM, HTN and gastric AVM  complicated with recurrent GI bleed who was admitted to the hospital for left Cerebellar stroke (HCC) presented with severe vertigo and right upper extremity numbness and weakness, however by the time of the arrival to the emergency room all of her symptoms were almost completely resolved.  MRI of the brain performed in the emergency room was concerning for small left cerebellar stroke.  CT angio of the head and neck was notable for bilateral antegrade vertebral artery flow however there was mild to moderate stenosis at the origin of the left vertebral artery and there was a thrombus in the aortic arch encroaching to the origin of the left subclavian artery.  Vascular surgery was consulted for further evaluation and recommendations.     Recommendations:  -Agree with initiation of the Plavix considering patient has symptomatic vertebral artery disease.  - Considering the patient has gastric AVM, I will discuss her situation further with Dr. Gomez from neurology to better delineate the risks of recurrent disease.  - Any surgical intervention after my discussion with Dr. Kc.  -Please call with questions or concerns.      1/19 RRT NOTE  RRT     *See RRT Documentation Record*     Reason the RRT was called: New right arm and right leg weakness     Assessment of patient leading up to RRT: A&O x 4, denies numbness and tingling at the time, RUE + RLE drift       Interventions/Testing: STAT CT, MRI    1/19 HOSPITALIST NOTE       Subjective:   Chester Bautista is a(n) 84 year old female was seen and examined  This AM stroke alert was called for increased R sided weakness  Subsequent CT unchanged  Currently resting in bed, comfortably   No cp, sob, f,c,n,v, abd pain or HA      Objective:   Blood pressure 149/62, pulse 106, temperature 98.4 °F (36.9 °C), temperature source Oral, resp. rate 16, height 5' 1\" (1.549 m), weight 138 lb (62.6 kg), SpO2 92%.       Assessment and Plan:   #Left Cerebellar Infarcts  - pt  with new sudden onset neuro changes  - 2 small cerebellar infarcts seen on admission MRI  -Neurology consulted  -Patient already took 3 baby aspirin's prior to coming to ED   -Neurology consulted  - CTA negative for LVO, but showing small subsegmental Pes  -f/u lipid panel, A1c, TSH, ECHO   -Continue statin, will add plavix  -PT/OT/ST eval  -stroke alert called 1/19 -> stroke CT non-acute, MRI pending, no TNK given GI bleed history  -statin increased to 40 mg     #Dizziness  Improved   -Likely in setting of above  -Meclizine prn   -Neurology consulted     #PE   -Patient is hemodynamically stable, EKG NSR and troponin not elevated.  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 Already took 3 baby aspirins prior to admission, will hold off further AC for now in setting of prior Anemia and GI AVMs   -Previously had IVC filter placed in March 2024 for PE at that time (removed June 2024).   -IR consult for IVC eval again   -Needs eval for hypercoagulable state. Risk factors of recent car ride, overall sedentary state, breast cancer. Sees Dr. 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   CKD3  Anemia - in setting of bleeding AVMs; getting iron infusions   Hx of Breast Cancer      DVT Ppx: SCDs  Full code  MDM: High      Results:            Recent Labs   Lab 01/17/25 2002 01/18/25  0943 01/19/25  0629   RBC 3.87 3.40* 3.30*   HGB 9.2* 8.2* 8.2*   HCT  30.7* 26.7* 26.6*   MCV 79.3* 78.5* 80.6   MCH 23.8* 24.1* 24.8*   MCHC 30.0* 30.7* 30.8*   RDW 17.4* 17.2* 17.3*   NEPRELIM 9.35* 6.07 11.91*   WBC 11.2* 9.1 15.5*   .0 301.0 316.0                  Recent Labs   Lab 01/17/25 2002 01/18/25  0943 01/19/25  0629   * 204* 249*   BUN 16 15 15   CREATSERUM 0.93 0.87 0.89   EGFRCR 60 65 63   CA 9.3 9.0 9.7    139 138   K 3.7 3.9 3.5    104 103   CO2 26.0 27.0 26.0            Imaging:   CT STROKE BRAIN (NO IV)(CPT=70450)     Result Date: 1/19/2025  CONCLUSION:  1. Small focus of hypoattenuation involving the left cerebellar hemisphere, which corresponds to the acute infarct demonstrated on the prior MRI brain dated 01/17/2025. 2. Otherwise, no transcortical area of hypoattenuation to suggest an acute infarct.  If there is clinical concern for an acute infarct, consider further evaluation with an MRI of the brain. 3. No acute intracranial hemorrhage, midline shift, mass effect, or hydrocephalus. 4. Redemonstrated postoperative changes from prior frontal craniotomy for aneurysm clipping along the course of the anterior cerebral arteries.  The associated metallic artifact from the aneurysm clips limits evaluation of the adjacent structures. 5. Lesser incidental findings described above.    Impression points 1-3 were communicated via telephone to Leonidas SOW at 9:39 a.m. on 01/19/2025 by Joe Deleon MD  Dictated by (CST): Joe Deleon MD on 1/19/2025 at 9:34 AM     Finalized by (CST): Joe Deleon MD on 1/19/2025 at 9:41 AM     1/19 NEUROLOGY CONSULT NOTE  Date of Consult:  1/19/2025  Reason for Consultation:   Acute stroke  Code stroke called     History of Present Illness:   Patient is a 84 year old female who was admitted to the hospital for Cerebellar stroke (HCC):     I have previously seen her on 1/18/2024, she has complicated past medical history including duodenal bleeding post cauterization and on octreotide injections.  She  developed acute symptoms of vertigo prior to her visit to the emergency department and MRI of the brain revealed a small stroke in the left cerebellum.   CT angiogram of the head and neck revealed an extensive irregular thrombus of the aortic arch that extends in the proximal part of the left subclavian artery origin there was however only mild narrowing of the vertebral artery origin  Given her complicated history with duodenal AVMs and recurrent bleeding (patient with iron deficiency anemia requiring iron transfusions and occasional PRBC transfusions) she was only started on Plavix 75 mg daily.  Vascular surgery consulted.      This morning she was called acute stroke for symptoms of weakness in the right hemibody.  I evaluated this patient as part of acute stroke code this morning.  Upon my assessment patient is in severe pain mainly around her right forearm and wrist area and around the inguinal and right thigh area.           Impression:   She is not a candidate for thrombolytic administration given her recent left cerebellar stroke as well as her complicated GI history with duodenal ulcers/AVM and recurrent bleeding- Currently on iron infusions with occasional PRBCs transfusion  I reviewed CT scan of the head without contrast, obtained stat, there are no acute intracranial findings  Exam reveals mild drift in right lower extremity however she complains of significant pain in the right inguinal and thigh area  CT angiogram of the head and neck revealed an extensive irregular thrombus of the aortic arch that extends in the proximal part of the left subclavian artery origin there was however only mild narrowing of the vertebral artery origin. NO LVO     Recommendations:  1- No TNK  2-MRI brain  3-vascular surgery consulted  4-continue Plavix 75 mg daily  5- Increase rosuvastatin to 40mg daily     1/19 GI NOTE    Subjective:      Ms. Bautista suffered another neurologic event this morning with dull pain in right  hand, then muscular weakness right hand and right leg, unable to raise right leg off bed.  Similar severe headache returned after that, then nausea and at least 1 episode of vomiting.     Stroke called, CT repeated, reassuring.     Waiting for repeat MRI scan.     Feeling better this afternoon.  Neurologic deficits seem to be resolving again.     Afebrile; HR 80s - 106; /68     Objective:   Blood pressure 150/73, pulse 87, temperature 98.4 °F (36.9 °C), temperature source Oral, resp. rate 16, height 5' 1\" (1.549 m), weight 138 lb (62.6 kg), SpO2 98%.         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     As before, she denies ever seeing wild melena or hematochezia with the occult GI bleeding.  She describes repeatedly becoming aware of worsening fatigue and dyspnea, found to have anemia on outpatient labs.     Well-known to Dr. 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     This time, Ms. Bautista was admitted for neurologic event.  She presented to the ED 1/17/2025 reporting dizziness/room spinning, numbness and tingling in right arm, feeling off balance.     Workup showing \"extensive irregular thrombus of aortic arch\" possible embolic source; 2 acute lacunar infarctions of left cerebellum     Started Plavix anticoagulation 1/18/2025.     CODE STROKE 1/19/2025 am     Patient and H&H appear stable 1/19/2025; hemoglobin overall seems to be running 8.1g-8.4g on  labs past 14 days.     Recommendations:  Neurology Service evaluation and workup noted  Started Plavix anticoagulation 1/18/2025  GI service has been consulted regarding the issue of starting Plavix anticoagulation in the setting of known chronic obscure GI bleeding, chronic blood loss anemia due to gastric, small bowel, colonic AVM lesions  After discharge, continue close outpatient monitoring CBC.  May require ?weekly CBC and iron studies next few months  As per operative notes summarized in my consultation, these recurring AVM lesions do not appear amenable to endoscopy and ablation treatments.  Ms. Bautista just started octreotide Sandostatin LAR monthly injections about 9 months ago which appear to be helping.

## 2025-01-20 NOTE — PROGRESS NOTES
[facility]  Vascular Surgery Progress Note    Chester Bautista Patient Status:  Inpatient    1940 MRN [id number]   Location [facility] 3W/SW Attending Richard Guerra MD   Hosp Day # 2 PCP Heike Sorto MD     Objective:   Temp: 98.1 °F (36.7 °C)  Pulse: 93  Resp: 18  BP: 122/61    Intake/Output:     Intake/Output Summary (Last 24 hours) at 2025 1655  Last data filed at 2025 1000  Gross per 24 hour   Intake 400 ml   Output 250 ml   Net 150 ml         Events Yesterday/Overnight:  Visited patient at the bedside.  It appears that she had another episode of right upper and lower extremity weakness for which she was evaluated with an outer MRI which was notable for a new stroke in the left thalamus.  She does not have any problems speaking.    Exam:  Vital signs are within normal limits.  Patient has 4/5 proximal and distal motor function in the right upper extremity.  She has 4/5 proximal motor function of the right lower extremity.  The right lower extremity distal motor function appears to be normal.  She does not have any sensory deficit.    Impression/Plan:  84 year old female with past medical history of COPD, DM, HTN and gastric AVM complicated with recurrent GI bleed who presented with left cerebellar stroke on Saturday, 2025.  Her hospital course was complicated with another stroke identified in the left thalamus on MRI on 2025.  She currently has slight weakness of the right upper extremity and proximal right lower extremity.  Her sensory function is intact. Her speech is intact    - I have scheduled the patient for a left upper extremity, aortic arch and cerebral angiogram with possible intervention on the left proximal subclavian artery as well as the left proximal vertebral artery.  - I have talked with the patient and her daughter Mrs. Brea Leyva in length about the risks of intraoperative stroke being close to 4-5%, however without treatment the  risk of stroke is going to be 25% in the next 2 years (according to NASCET findings).  - She is currently on Plavix.  We are going to continue the Plavix perioperatively.  - Please call with questions or concerns.    Patient Active Problem List   Diagnosis    Adult general medical examination    Vaccine counseling    Colon cancer screening    Type 2 diabetes mellitus without retinopathy (HCC)    Sarcoidosis    Anemia, iron deficiency    Dyslipidemia    Hypercalcemia    Hypertension    Infiltrating ductal carcinoma of right breast, stage 1 (HCC)    Rheumatoid arthritis (HCC)    Osteoarthrosis    Peripheral vascular disease due to secondary diabetes mellitus (HCC)    Neuropathy    AVM (arteriovenous malformation) of duodenum, acquired    Pseudophakia of both eyes    Vitreous floaters of both eyes    Glaucoma suspect of both eyes    Iron deficiency anemia due to chronic blood loss    Malabsorption of iron (HCC)    Port-A-Cath in place    Chronic obstructive pulmonary disease, unspecified (HCC)    Adnexal mass    Encounter for care related to vascular access port    High risk medication use    Meibomian gland dysfunction (MGD) of both eyes    Hypokalemia    Hyperkalemia    Hypomagnesemia    Constipation    Gastric AVM    AVM (arteriovenous malformation) (HCC)    Leukocytosis    Presence of IVC filter    CKD (chronic kidney disease) stage 3, GFR 30-59 ml/min (HCC)    Dry eye syndrome of both eyes    Vitamin D deficiency    Cystic thyroid nodule    Cough    Chronic fatigue    Cerebellar stroke (HCC)    Dizziness    Right hand paresthesia    Thrombus of aorta (HCC)    Bilateral pulmonary embolism (HCC)    Acute cystitis without hematuria    PE (pulmonary thromboembolism) (HCC)    Aortic thrombus (HCC)    New cerebellar infarct (HCC)         Medications:  Current Facility-Administered Medications   Medication Dose Route Frequency    rosuvastatin (Crestor) tab 40 mg  40 mg Oral Nightly    ceFEPIme (Maxipime) 1 g in sodium  chloride 0.9% 100 mL IVPB-MBP  1 g Intravenous Q12H    docusate sodium (Colace) cap 100 mg  100 mg Oral BID    polyethylene glycol (PEG 3350) (Miralax) 17 g oral packet 17 g  17 g Oral Daily PRN    magnesium hydroxide (Milk of Magnesia) 400 MG/5ML oral suspension 30 mL  30 mL Oral Daily PRN    bisacodyl (Dulcolax) 10 MG rectal suppository 10 mg  10 mg Rectal Daily PRN    fleet enema (Fleet) rectal enema 133 mL  1 enema Rectal Once PRN    meclizine (Antivert) tab 12.5 mg  12.5 mg Oral TID PRN    acetaminophen (Tylenol) tab 650 mg  650 mg Oral Q4H PRN    Or    acetaminophen (Tylenol) rectal suppository 650 mg  650 mg Rectal Q4H PRN    hydrALAzine (Apresoline) 20 mg/mL injection 10 mg  10 mg Intravenous Q2H PRN    ondansetron (Zofran) 4 MG/2ML injection 4 mg  4 mg Intravenous Q6H PRN    metoclopramide (Reglan) 5 mg/mL injection 5 mg  5 mg Intravenous Q8H PRN    acetaminophen (Tylenol) tab 650 mg  650 mg Oral Q6H PRN    ondansetron (Zofran) 4 MG/2ML injection 4 mg  4 mg Intravenous Q6H PRN    albuterol (Ventolin) (2.5 MG/3ML) 0.083% nebulizer solution 2.5 mg  2.5 mg Nebulization Q4H PRN    carvedilol (Coreg) tab 12.5 mg  12.5 mg Oral BID with meals    cyclobenzaprine (Flexeril) tab 10 mg  10 mg Oral Nightly PRN    famotidine (Pepcid) tab 20 mg  20 mg Oral BID    fluticasone-salmeterol (Advair Diskus) 250-50 MCG/ACT inhaler 1 puff  1 puff Inhalation 2 times per day    gabapentin (Neurontin) cap 100 mg  100 mg Oral Nightly    cetirizine (ZyrTEC) tab 10 mg  10 mg Oral Daily    montelukast (Singulair) tab 10 mg  10 mg Oral Nightly    predniSONE (Deltasone) tab 5 mg  5 mg Oral Daily    losartan (Cozaar) tab 50 mg  50 mg Oral Daily    hydroxychloroquine (Plaquenil) tab 300 mg  300 mg Oral Daily    oxyCODONE immediate release tab 5 mg  5 mg Oral Q6H PRN    clopidogrel (Plavix) tab 75 mg  75 mg Oral Daily    insulin aspart (NovoLOG) 100 Units/mL FlexPen 1-11 Units  1-11 Units Subcutaneous TID CC    insulin degludec (Tresiba)  100 units/mL flextouch 10 Units  10 Units Subcutaneous Nightly       Laboratory/Data:         Recent Labs   Lab 01/16/25  1419 01/17/25 2002 01/17/25 2009 01/18/25  0943 01/19/25  0629 01/20/25  1240   WBC 10.1 11.2*  --  9.1 15.5* 9.9   HGB 8.1* 9.2*  --  8.2* 8.2* 9.0*   MCV 80.5 79.3*  --  78.5* 80.6 81.8   .0 328.0  --  301.0 316.0 193.0   INR  --   --  0.94  --   --   --        Recent Labs   Lab 01/16/25  1422 01/17/25 2002 01/18/25 0943 01/19/25 0629 01/19/25  1525 01/20/25  1241   NA  --  138 139 138  --  137   K  --  3.7 3.9 3.5 4.8 4.4   CL  --  103 104 103  --  106   CO2  --  26.0 27.0 26.0  --  20.0*   BUN  --  16 15 15  --  12   CREATSERUM  --  0.93 0.87 0.89  --  1.07*   CA  --  9.3 9.0 9.7  --  10.7*   MG 1.3*  --   --   --   --   --    GLU  --  229* 204* 249*  --  140*     Lisandra Long MD  [facility], Division of Vascular Surgery  1/20/2025  4:55 PM

## 2025-01-20 NOTE — PROGRESS NOTES
[facility]  [address] [place] [address], [id number]  [facility] [address]  [phone]            Chester Bautista Patient Status:  Inpatient    1940 MRN [id number]   Location [facility] 3W/SW Attending Richard Guerra MD   Hosp Day # 2 PCP Heike Sorto MD     Subjective:  Chester Bautista is a(n) 84 year old female.  MRI of her brain reveals a small stroke in the cranial part of her left thalamus  She continues to complain of pain around her right forearm wrist area, denies any pain in right lower extremity, said that the strength in her right lower extremity has improved significantly.      Current Facility-Administered Medications   Medication Dose Route Frequency    rosuvastatin (Crestor) tab 40 mg  40 mg Oral Nightly    ceFEPIme (Maxipime) 1 g in sodium chloride 0.9% 100 mL IVPB-MBP  1 g Intravenous Q12H    docusate sodium (Colace) cap 100 mg  100 mg Oral BID    polyethylene glycol (PEG 3350) (Miralax) 17 g oral packet 17 g  17 g Oral Daily PRN    magnesium hydroxide (Milk of Magnesia) 400 MG/5ML oral suspension 30 mL  30 mL Oral Daily PRN    bisacodyl (Dulcolax) 10 MG rectal suppository 10 mg  10 mg Rectal Daily PRN    fleet enema (Fleet) rectal enema 133 mL  1 enema Rectal Once PRN    meclizine (Antivert) tab 12.5 mg  12.5 mg Oral TID PRN    acetaminophen (Tylenol) tab 650 mg  650 mg Oral Q4H PRN    Or    acetaminophen (Tylenol) rectal suppository 650 mg  650 mg Rectal Q4H PRN    hydrALAzine (Apresoline) 20 mg/mL injection 10 mg  10 mg Intravenous Q2H PRN    ondansetron (Zofran) 4 MG/2ML injection 4 mg  4 mg Intravenous Q6H PRN    metoclopramide (Reglan) 5 mg/mL injection 5 mg  5 mg Intravenous Q8H PRN    acetaminophen (Tylenol) tab 650 mg  650 mg Oral Q6H PRN    ondansetron (Zofran) 4 MG/2ML injection 4 mg  4 mg Intravenous Q6H PRN    albuterol (Ventolin) (2.5 MG/3ML) 0.083% nebulizer solution 2.5 mg  2.5 mg Nebulization Q4H PRN    carvedilol  (Coreg) tab 12.5 mg  12.5 mg Oral BID with meals    cyclobenzaprine (Flexeril) tab 10 mg  10 mg Oral Nightly PRN    famotidine (Pepcid) tab 20 mg  20 mg Oral BID    fluticasone-salmeterol (Advair Diskus) 250-50 MCG/ACT inhaler 1 puff  1 puff Inhalation 2 times per day    gabapentin (Neurontin) cap 100 mg  100 mg Oral Nightly    cetirizine (ZyrTEC) tab 10 mg  10 mg Oral Daily    montelukast (Singulair) tab 10 mg  10 mg Oral Nightly    predniSONE (Deltasone) tab 5 mg  5 mg Oral Daily    losartan (Cozaar) tab 50 mg  50 mg Oral Daily    hydroxychloroquine (Plaquenil) tab 300 mg  300 mg Oral Daily    oxyCODONE immediate release tab 5 mg  5 mg Oral Q6H PRN    clopidogrel (Plavix) tab 75 mg  75 mg Oral Daily    insulin aspart (NovoLOG) 100 Units/mL FlexPen 1-11 Units  1-11 Units Subcutaneous TID CC    insulin degludec (Tresiba) 100 units/mL flextouch 10 Units  10 Units Subcutaneous Nightly       Objective:  Blood pressure 140/64, pulse 99, temperature 98.4 °F (36.9 °C), temperature source Oral, resp. rate 16, height 61\", weight 136 lb 8 oz (61.9 kg), SpO2 94%.    Physical Exam:  Vitals:    01/20/25 0400 01/20/25 0800 01/20/25 1000 01/20/25 1200   BP: 151/74 153/72 145/63 140/64   Pulse: 106 108 101 99   Resp: 16 17  16   Temp: 98.4 °F (36.9 °C) 98.2 °F (36.8 °C)  98.4 °F (36.9 °C)   TempSrc: Oral Oral  Oral   SpO2: 92% 94%  94%   Weight: 136 lb 8 oz (61.9 kg)      Height:         Visual field is full to confrontation test, extraocular movement intact, pupils equal and reactive to light, face appears symmetric, speech is normal and tongue protrudes in the midline  She has supple neck and normal tone in all extremities, pain in right upper extremity and therefore motor exam was limited, she appeared slightly weak in her right hand though the exam was limited by pain.  She was not weak in her right lower extremity at proximal or distal muscles.  This has improved compared to yesterday    Lab Results   Component Value Date     WBC 15.5 (H) 01/19/2025    HGB 8.2 (L) 01/19/2025    HCT 26.6 (L) 01/19/2025    .0 01/19/2025    CREATSERUM 1.07 (H) 01/20/2025    BUN 12 01/20/2025     01/20/2025    K 4.4 01/20/2025     01/20/2025    CO2 20.0 (L) 01/20/2025     (H) 01/20/2025    CA 10.7 (H) 01/20/2025    ALB 3.5 11/29/2024    ALKPHO 103 10/17/2024    BILT 0.2 10/17/2024    TP 7.2 10/17/2024    AST 31 11/29/2024    ALT 14 11/29/2024    PTT 27.0 01/18/2025    INR 0.94 01/17/2025    T4F 1.2 01/17/2025    TSH 0.418 (L) 01/17/2025    DDIMER 1.60 (H) 02/23/2024    ESRML >130 (H) 11/29/2024    CRP 3.00 (H) 11/29/2024    MG 1.3 (L) 01/16/2025    PHOS 3.9 06/07/2024    B12 814 11/01/2024       MRI BRAIN (CPT=70551)    Result Date: 1/20/2025  CONCLUSION:  1. Interval development of a punctate lacunar infarct of the left thalamus, concerning for acute ischemia.  2. Subacute lacunar infarcts of the left cerebellar hemisphere.   3. Postoperative changes of frontal craniotomy and parafalcine aneurysm clipping.  4. Senescent changes of parenchymal volume loss with mild sequela of chronic microvascular ischemic disease.  5. Lesser incidental findings as above.      A preliminary report was issued by the [facility] teleradiology service. There are no major discrepancies.  elm-remote.   Dictated by (CST): Sharath Gruber MD on 1/20/2025 at 9:24 AM     Finalized by (CST): Sharath Gruber MD on 1/20/2025 at 9:31 AM          XR CHEST AP PORTABLE  (CPT=71045)    Result Date: 1/20/2025  CONCLUSION:  1. Right-sided chest port in place.  2. Cardiomegaly with extensive pulmonary fibrosis.   elm-remote.   Dictated by (CST): Sharath Gruber MD on 1/20/2025 at 6:29 AM     Finalized by (CST): Sharath Gruber MD on 1/20/2025 at 6:32 AM          CT STROKE BRAIN (NO IV)(CPT=70450)    Result Date: 1/19/2025  CONCLUSION:  1. Small focus of hypoattenuation involving the left cerebellar hemisphere, which corresponds to the acute infarct demonstrated on  the prior MRI brain dated 01/17/2025. 2. Otherwise, no transcortical area of hypoattenuation to suggest an acute infarct.  If there is clinical concern for an acute infarct, consider further evaluation with an MRI of the brain. 3. No acute intracranial hemorrhage, midline shift, mass effect, or hydrocephalus. 4. Redemonstrated postoperative changes from prior frontal craniotomy for aneurysm clipping along the course of the anterior cerebral arteries.  The associated metallic artifact from the aneurysm clips limits evaluation of the adjacent structures. 5. Lesser incidental findings described above.    Impression points 1-3 were communicated via telephone to Leonidas SOW at 9:39 a.m. on 01/19/2025 by Joe Deleon MD  Dictated by (CST): Joe Deleon MD on 1/19/2025 at 9:34 AM     Finalized by (CST): Joe Deleon MD on 1/19/2025 at 9:41 AM                 Assessment:  Patient Active Problem List   Diagnosis    Adult general medical examination    Vaccine counseling    Colon cancer screening    Type 2 diabetes mellitus without retinopathy (HCC)    Sarcoidosis    Anemia, iron deficiency    Dyslipidemia    Hypercalcemia    Hypertension    Infiltrating ductal carcinoma of right breast, stage 1 (HCC)    Rheumatoid arthritis (HCC)    Osteoarthrosis    Peripheral vascular disease due to secondary diabetes mellitus (HCC)    Neuropathy    AVM (arteriovenous malformation) of duodenum, acquired    Pseudophakia of both eyes    Vitreous floaters of both eyes    Glaucoma suspect of both eyes    Iron deficiency anemia due to chronic blood loss    Malabsorption of iron (HCC)    Port-A-Cath in place    Chronic obstructive pulmonary disease, unspecified (HCC)    Adnexal mass    Encounter for care related to vascular access port    High risk medication use    Meibomian gland dysfunction (MGD) of both eyes    Hypokalemia    Hyperkalemia    Hypomagnesemia    Constipation    Gastric AVM    AVM (arteriovenous malformation) (HCC)     Leukocytosis    Presence of IVC filter    CKD (chronic kidney disease) stage 3, GFR 30-59 ml/min (HCC)    Dry eye syndrome of both eyes    Vitamin D deficiency    Cystic thyroid nodule    Cough    Chronic fatigue    Cerebellar stroke (HCC)    Dizziness    Right hand paresthesia    Thrombus of aorta (HCC)    Bilateral pulmonary embolism (HCC)    Acute cystitis without hematuria    PE (pulmonary thromboembolism) (HCC)    Aortic thrombus (HCC)    New cerebellar infarct (HCC)      Cerebellar stroke (HCC)  Left thalamic stroke  MRI of the brain, repeated, revealed a small stroke in the left thalamus, cranial portion  CT angiogram of the head and neck revealed 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  She has a history of AVMs in her duodenum that have been cauterized, maintained on octreotide injections requiring iron transfusions and rarely blood transfusions  Echocardiogram with ejection fraction of 55%, mild hypokinesis of the mid anterolateral wall.  No significant change from previous echocardiogram  Maintained on Plavix 75 mg, she received a load of 150 mg this morning in addition to her maintenance dose of 75mg  so she received a total of 225 mg of Plavix today. She had received 2 doses of Plavix over the past 2 days       Recommendations:  1-discussed with vascular surgery-GI consulted  2- Will check P2Y12  3-continue high-dose rosuvastatin at 40 mg daily  5-PT/OT/ST  6-A1c 10.1 , lipid panel with LDL of 85  7- High risk morbidity/mortality given her GI bleeding history and need for antiplatelet now, potential anticoagulation in the future, this was communicated with family    Complex patient with risk of deterioration        Neurology following     Thank you for allowing me to participate in the care of your patient.     Afia Zavala MD

## 2025-01-20 NOTE — PLAN OF CARE
Problem: HEMATOLOGIC - ADULT  Goal: Free from bleeding injury  Description: (Example usage: patient with low platelets)  INTERVENTIONS:  - Avoid intramuscular injections, enemas and rectal medication administration  - Ensure safe mobilization of patient  - Hold pressure on venipuncture sites to achieve adequate hemostasis  - Assess for signs and symptoms of internal bleeding  - Monitor lab trends  - Patient is to report abnormal signs of bleeding to staff  - Avoid use of toothpicks and dental floss  - Use electric shaver for shaving  - Use soft bristle tooth brush  - Limit straining and forceful nose blowing  Outcome: Progressing     Problem: Patient Centered Care  Goal: Patient preferences are identified and integrated in the patient's plan of care  Description: Interventions:  - What would you like us to know as we care for you? From home with daughter   - Provide timely, complete, and accurate information to patient/family  - Incorporate patient and family knowledge, values, beliefs, and cultural backgrounds into the planning and delivery of care  - Encourage patient/family to participate in care and decision-making at the level they choose  - Honor patient and family perspectives and choices  Outcome: Progressing     Problem: Diabetes/Glucose Control  Goal: Glucose maintained within prescribed range  Description: INTERVENTIONS:  - Monitor Blood Glucose as ordered  - Assess for signs and symptoms of hyperglycemia and hypoglycemia  - Administer ordered medications to maintain glucose within target range  - Assess barriers to adequate nutritional intake and initiate nutrition consult as needed  - Instruct patient on self management of diabetes  Outcome: Progressing     Problem: Patient/Family Goals  Goal: Patient/Family Long Term Goal  Description: Patient's Long Term Goal: discharge from hospital     Interventions:  - monitor vital signs   - monitor blood glucose levels  - monitor appropriate labs  - pain  management   - administer medications per order  - follow MD orders  - diagnostics per order  - update and inform patient and family on plan of care  - discharge planning  - See additional Care Plan goals for specific interventions  Outcome: Progressing  Goal: Patient/Family Short Term Goal  Description: Patient's Short Term Goal: manage pain     Interventions:   - monitor vital signs   - monitor blood glucose levels  - monitor appropriate labs  - pain management   - administer medications per order  - follow MD orders  - diagnostics per order  - update and inform patient and family on plan of care  - See additional Care Plan goals for specific interventions  Outcome: Progressing     Problem: CARDIOVASCULAR - ADULT  Goal: Maintains optimal cardiac output and hemodynamic stability  Description: INTERVENTIONS:  - Monitor vital signs, rhythm, and trends  - Monitor for bleeding, hypotension and signs of decreased cardiac output  - Evaluate effectiveness of vasoactive medications to optimize hemodynamic stability  - Monitor arterial and/or venous puncture sites for bleeding and/or hematoma  - Assess quality of pulses, skin color and temperature  - Assess for signs of decreased coronary artery perfusion - ex. Angina  - Evaluate fluid balance, assess for edema, trend weights  Outcome: Progressing  Goal: Absence of cardiac arrhythmias or at baseline  Description: INTERVENTIONS:  - Continuous cardiac monitoring, monitor vital signs, obtain 12 lead EKG if indicated  - Evaluate effectiveness of antiarrhythmic and heart rate control medications as ordered  - Initiate emergency measures for life threatening arrhythmias  - Monitor electrolytes and administer replacement therapy as ordered  Outcome: Progressing     Problem: METABOLIC/FLUID AND ELECTROLYTES - ADULT  Goal: Glucose maintained within prescribed range  Description: INTERVENTIONS:  - Monitor Blood Glucose as ordered  - Assess for signs and symptoms of hyperglycemia and  hypoglycemia  - Administer ordered medications to maintain glucose within target range  - Assess barriers to adequate nutritional intake and initiate nutrition consult as needed  - Instruct patient on self management of diabetes  Outcome: Progressing  Goal: Electrolytes maintained within normal limits  Description: INTERVENTIONS:  - Monitor labs and rhythm and assess patient for signs and symptoms of electrolyte imbalances  - Administer electrolyte replacement as ordered  - Monitor response to electrolyte replacements, including rhythm and repeat lab results as appropriate  - Fluid restriction as ordered  - Instruct patient on fluid and nutrition restrictions as appropriate  Outcome: Progressing  Goal: Hemodynamic stability and optimal renal function maintained  Description: INTERVENTIONS:  - Monitor labs and assess for signs and symptoms of volume excess or deficit  - Monitor intake, output and patient weight  - Monitor urine specific gravity, serum osmolarity and serum sodium as indicated or ordered  - Monitor response to interventions for patient's volume status, including labs, urine output, blood pressure (other measures as available)  - Encourage oral intake as appropriate  - Instruct patient on fluid and nutrition restrictions as appropriate  Outcome: Progressing     Problem: NEUROLOGICAL - ADULT  Goal: Achieves stable or improved neurological status  Description: INTERVENTIONS  - Assess for and report changes in neurological status  - Initiate measures to prevent increased intracranial pressure  - Maintain blood pressure and fluid volume within ordered parameters to optimize cerebral perfusion and minimize risk of hemorrhage  - Monitor temperature, glucose, and sodium. Initiate appropriate interventions as ordered  Outcome: Progressing  Goal: Achieves maximal functionality and self care  Description: INTERVENTIONS  - Monitor swallowing and airway patency with patient fatigue and changes in neurological  status  - Encourage and assist patient to increase activity and self care with guidance from PT/OT  - Encourage visually impaired, hearing impaired and aphasic patients to use assistive/communication devices  Outcome: Progressing     Problem: PAIN - ADULT  Goal: Verbalizes/displays adequate comfort level or patient's stated pain goal  Description: INTERVENTIONS:  - Encourage pt to monitor pain and request assistance  - Assess pain using appropriate pain scale  - Administer analgesics based on type and severity of pain and evaluate response  - Implement non-pharmacological measures as appropriate and evaluate response  - Consider cultural and social influences on pain and pain management  - Manage/alleviate anxiety  - Utilize distraction and/or relaxation techniques  - Monitor for opioid side effects  - Notify MD/LIP if interventions unsuccessful or patient reports new pain  - Anticipate increased pain with activity and pre-medicate as appropriate  Outcome: Progressing     Problem: SAFETY ADULT - FALL  Goal: Free from fall injury  Description: INTERVENTIONS:  - Assess pt frequently for physical needs  - Identify cognitive and physical deficits and behaviors that affect risk of falls.  - [place] fall precautions as indicated by assessment.  - Educate pt/family on patient safety including physical limitations  - Instruct pt to call for assistance with activity based on assessment  - Modify environment to reduce risk of injury  - Provide assistive devices as appropriate  - Consider OT/PT consult to assist with strengthening/mobility  - Encourage toileting schedule  Outcome: Progressing     Problem: DISCHARGE PLANNING  Goal: Discharge to home or other facility with appropriate resources  Description: INTERVENTIONS:  - Identify barriers to discharge w/pt and caregiver  - Include patient/family/discharge partner in discharge planning  - Arrange for needed discharge resources and transportation as appropriate  - Identify  discharge learning needs (meds, wound care, etc)  - Arrange for interpreters to assist at discharge as needed  - Consider post-discharge preferences of patient/family/discharge partner  - Complete POLST form as appropriate  - Assess patient's ability to be responsible for managing their own health  - Refer to Case Management Department for coordinating discharge planning if the patient needs post-hospital services based on physician/LIP order or complex needs related to functional status, cognitive ability or social support system  Outcome: Progressing     Problem: SKIN/TISSUE INTEGRITY - ADULT  Goal: Skin integrity remains intact  Description: INTERVENTIONS  - Assess and document risk factors for pressure ulcer development  - Assess and document skin integrity  - Monitor for areas of redness and/or skin breakdown  - Initiate interventions, skin care algorithm/standards of care as needed  Outcome: Progressing     Problem: MUSCULOSKELETAL - ADULT  Goal: Return mobility to safest level of function  Description: INTERVENTIONS:  - Assess patient stability and activity tolerance for standing, transferring and ambulating w/ or w/o assistive devices  - Assist with transfers and ambulation using safe patient handling equipment as needed  - Ensure adequate protection for wounds/incisions during mobilization  - Obtain PT/OT consults as needed  - Advance activity as appropriate  - Communicate ordered activity level and limitations with patient/family  Outcome: Progressing     Problem: Impaired Swallowing  Goal: Minimize aspiration risk  Description: Interventions:  - Patient should be alert and upright for all feedings (90 degrees preferred)  - Offer food and liquids at a slow rate  - No straws  - Encourage small bites of food and small sips of liquid  - Offer pills one at a time, crush or deliver with applesauce as needed  - Discontinue feeding and notify MD (or speech pathologist) if coughing or persistent throat clearing or  wet/gurgly vocal quality is noted  Outcome: Progressing    Patient started on intravenous antibiotics. Neuro checks every 4hr and daily NIH stroke scale completed. No blood return from MD fer notified. Given prn pain and antiemetic medications per MAR orders. Seen by physical therapy/OT today, tolerated ambulation fairly well but did report dizziness afterwards - resolved with rest.

## 2025-01-20 NOTE — DISCHARGE INSTRUCTIONS
Home Health: Sometimes managing your health at home requires assistance.  The [facility] team has recognized your preference to use [facility].  They can be reached by phone at [phone].  The fax number for your reference is [phone].  A representative from the home health agency will contact you or your family to schedule your first visit.      [facility]  [address] [place] [address]  Phone: [phone]  Fax: [phone]    Diabetes:  Your A1C level is greater than 8%.  It is recommended that you attend an outpatient diabetes education program.  Please discuss with your Primary Care Provider at your next visit to obtain a referral.  If you wish to make an appointment at [facility], call [phone].

## 2025-01-20 NOTE — OCCUPATIONAL THERAPY NOTE
OCCUPATIONAL THERAPY EVALUATION - INPATIENT     Room Number: 319/319-A  Evaluation Date: 1/20/2025  Type of Evaluation: Initial  Presenting Problem: CVA    Physician Order: IP Consult to Occupational Therapy  Reason for Therapy: ADL/IADL Dysfunction and Discharge Planning    OCCUPATIONAL THERAPY ASSESSMENT   Patient is a 84 year old female admitted 1/17/2025 for CVA.  Prior to admission, patient's baseline is indep ADLs and functional mobility/TFRs w/o Assistive device.  Patient is currently functioning below baseline with ADLs and functional mobility/TFRs.  Patient is requiring up to min A for ADLS and SBA/CGA for functional mobility/TFRS as a result of the following impairments: decreased functional strength, decreased endurance, pain, impaired   balance, impaired coordination, and decreased muscular endurance. Occupational Therapy will continue to follow for duration of hospitalization.    Patient will benefit from continued skilled OT Services at discharge to promote prior level of function and safety with additional support and return home with OP OT.    PLAN DURING HOSPITALIZATION     OT Treatment Plan: Balance activities;Energy conservation/work simplification techniques;ADL training;Functional transfer training;Endurance training;Patient/Family education;Fine motor coordination activities     OCCUPATIONAL THERAPY MEDICAL/SOCIAL HISTORY   Problem List  Principal Problem:    Cerebellar stroke (HCC)  Active Problems:    Dizziness    Right hand paresthesia    Thrombus of aorta (HCC)    Bilateral pulmonary embolism (HCC)    Acute cystitis without hematuria    HOME SITUATION  Type of Home: House  Home Layout: Two level  Lives With: Daughter  Toilet and Equipment: Standard height toilet  Shower/Tub and Equipment: Tub-shower combo; Shower chair; Grab bar  Other Equipment: Other (Comment) (4ww, 2ww)  Hand Dominance: Right  Drives: No  Patient Regularly Uses: -- (no AD)      SUBJECTIVE  \"I want to get my right hand  jenifer\"    OCCUPATIONAL THERAPY EXAMINATION      OBJECTIVE  Precautions: Bed/chair alarm  Fall Risk: Standard fall risk      PAIN ASSESSMENT  Ratin         COGNITION  Overall Cognitive Status:  WFL - within functional limits    VISION  Current Vision: no visual deficits    PERCEPTION  Overall Perception Status:   WFL - within functional limits    SENSATION  Light touch:  intact    Communication: WNL        RANGE OF MOTION   Upper extremity ROM is within functional limits     STRENGTH ASSESSMENT  Upper extremity strength is within functional limits except for the following; R hand  strength (3/5)    COORDINATION  Gross Motor: WFL   Fine Motor: Inc challenge w R Hand    ACTIVITIES OF DAILY LIVING ASSESSMENT  AM-PAC ‘6-Clicks’ Inpatient Daily Activity Short Form  How much help from another person does the patient currently need…  -   Putting on and taking off regular lower body clothing?: A Little  -   Bathing (including washing, rinsing, drying)?: A Little  -   Toileting, which includes using toilet, bedpan or urinal? : A Little  -   Putting on and taking off regular upper body clothing?: A Little  -   Taking care of personal grooming such as brushing teeth?: A Little  -   Eating meals?: A Little    AM-PAC Score:  Score: 18  Approx Degree of Impairment: 46.65%  Standardized Score (AM-PAC Scale): 38.66  CMS Modifier (G-Code): CK    BED MOBILITY  Sit to Supine: min assist    FUNCTIONAL TRANSFER ASSESSMENT  Sit to Stand from Chair: contact guard assist  Toilet Transfer: contact guard assist  Comments:    2ww cues, cues for safety and positioning    FUNCTIONAL MOBILITY  contact guard assist for hallway fx mobility using RW  Comments:     Approx 150 ft total, 2 seated rest breaks.     ACTIVITIES OF DAILY LIVING  Eating: setup assist  Grooming: setup assist  UB Dressing: setup assist  LB Dressing: min assist  Toileting: contact guard assist  Comments:     Graded based on abilities during session and clinical  judgement. Pt demos abilities with:  Toileting, LB dressing (inc time for socks, able to don/doff brief distally), hand hygiene at sink. Inc time to manipulate containers for lotion w use of R hand.          Skilled Therapy Provided: Pt seen for OT session, RN approved. See ADL and mobility grids, DC recs for details. At the end, Pt positioned upright in bed w alarm on, call light and personal items in reach, RN Aware of pt onset of nausea at end of session. EDU Pt and DTR on BE FAST. Report understanding.       EDUCATION PROVIDED  Patient Education : Role of Occupational Therapy; Plan of Care; Discharge Recommendations; DME Recommendations; Functional Transfer Techniques; Fall Prevention; Posture/Positioning; Energy Conservation; Proper Body Mechanics  Patient's Response to Education: Verbalized Understanding; Returned Demonstration    The patient's Approx Degree of Impairment: 46.65% has been calculated based on documentation in the [facility] '6 clicks' Inpatient Daily Activity Short Form.  Research supports that patients with this level of impairment may benefit from C.  Final disposition will be made by interdisciplinary medical team.     Patient End of Session: In bed;Needs met;Call light within reach;RN aware of session/findings;All patient questions and concerns addressed;Hospital anti-slip socks;Family present    OT Goals  Patients self stated goal is: get stronger with R hand     Patient will complete functional transfer with mod I least restrictive Assistive device    Comment:     Patient will complete toileting with mod I  Comment:     Patient will tolerate standing for 5 minutes in prep for adls with mod I   Comment:    Patient will complete LB dressing mod I   Comment:          Goals  on: 2/3/25  Frequency: 3-5xs/week    Patient Evaluation Complexity Level:   Occupational Profile/Medical History LOW - Brief history including review of medical or therapy records    Specific performance deficits  impacting engagement in ADL/IADL MODERATE  3 - 5 performance deficits   Client Assessment/Performance Deficits MODERATE - Comorbidities and min to mod modifications of tasks    Clinical Decision Making MODERATE - Analysis of occupational profile, detailed assessments, several treatment options    Overall Complexity LOW     OT Session Time: 30 minutes  Self-Care Home Management: 12 minutes  Therapeutic Activity: 18 minutes

## 2025-01-21 ENCOUNTER — APPOINTMENT (OUTPATIENT)
Dept: GENERAL RADIOLOGY | Facility: HOSPITAL | Age: 85
End: 2025-01-21
Attending: HOSPITALIST
Payer: MEDICARE

## 2025-01-21 ENCOUNTER — APPOINTMENT (OUTPATIENT)
Age: 85
End: 2025-01-21
Attending: INTERNAL MEDICINE
Payer: MEDICARE

## 2025-01-21 ENCOUNTER — PATIENT MESSAGE (OUTPATIENT)
Dept: INTERNAL MEDICINE CLINIC | Facility: CLINIC | Age: 85
End: 2025-01-21

## 2025-01-21 LAB
ALBUMIN SERPL-MCNC: 3.4 G/DL (ref 3.2–4.8)
ALBUMIN/GLOB SERPL: 1 {RATIO} (ref 1–2)
ALP LIVER SERPL-CCNC: 80 U/L
ALT SERPL-CCNC: <7 U/L
ANION GAP SERPL CALC-SCNC: 8 MMOL/L (ref 0–18)
AST SERPL-CCNC: 16 U/L (ref ?–34)
BASOPHILS # BLD AUTO: 0.08 X10(3) UL (ref 0–0.2)
BASOPHILS NFR BLD AUTO: 0.7 %
BILIRUB SERPL-MCNC: 0.3 MG/DL (ref 0.2–1.1)
BUN BLD-MCNC: 20 MG/DL (ref 9–23)
BUN/CREAT SERPL: 16.3 (ref 10–20)
CALCIUM BLD-MCNC: 10 MG/DL (ref 8.7–10.4)
CHLORIDE SERPL-SCNC: 104 MMOL/L (ref 98–112)
CO2 SERPL-SCNC: 26 MMOL/L (ref 21–32)
CREAT BLD-MCNC: 1.23 MG/DL
DEPRECATED RDW RBC AUTO: 53 FL (ref 35.1–46.3)
EGFRCR SERPLBLD CKD-EPI 2021: 43 ML/MIN/1.73M2 (ref 60–?)
EOSINOPHIL # BLD AUTO: 0.49 X10(3) UL (ref 0–0.7)
EOSINOPHIL NFR BLD AUTO: 4.1 %
ERYTHROCYTE [DISTWIDTH] IN BLOOD BY AUTOMATED COUNT: 17.9 % (ref 11–15)
GLOBULIN PLAS-MCNC: 3.4 G/DL (ref 2–3.5)
GLUCOSE BLD-MCNC: 189 MG/DL (ref 70–99)
GLUCOSE BLDC GLUCOMTR-MCNC: 201 MG/DL (ref 70–99)
GLUCOSE BLDC GLUCOMTR-MCNC: 204 MG/DL (ref 70–99)
GLUCOSE BLDC GLUCOMTR-MCNC: 232 MG/DL (ref 70–99)
GLUCOSE BLDC GLUCOMTR-MCNC: 302 MG/DL (ref 70–99)
HCT VFR BLD AUTO: 27.7 %
HGB BLD-MCNC: 8.4 G/DL
IMM GRANULOCYTES # BLD AUTO: 0.09 X10(3) UL (ref 0–1)
IMM GRANULOCYTES NFR BLD: 0.8 %
LYMPHOCYTES # BLD AUTO: 2.16 X10(3) UL (ref 1–4)
LYMPHOCYTES NFR BLD AUTO: 18.1 %
MAGNESIUM SERPL-MCNC: 1.4 MG/DL (ref 1.6–2.6)
MCH RBC QN AUTO: 24.8 PG (ref 26–34)
MCHC RBC AUTO-ENTMCNC: 30.3 G/DL (ref 31–37)
MCV RBC AUTO: 81.7 FL
MONOCYTES # BLD AUTO: 1.24 X10(3) UL (ref 0.1–1)
MONOCYTES NFR BLD AUTO: 10.4 %
NEUTROPHILS # BLD AUTO: 7.85 X10 (3) UL (ref 1.5–7.7)
NEUTROPHILS # BLD AUTO: 7.85 X10(3) UL (ref 1.5–7.7)
NEUTROPHILS NFR BLD AUTO: 65.9 %
OSMOLALITY SERPL CALC.SUM OF ELEC: 294 MOSM/KG (ref 275–295)
PLATELET # BLD AUTO: 348 10(3)UL (ref 150–450)
POTASSIUM SERPL-SCNC: 4.3 MMOL/L (ref 3.5–5.1)
PROT SERPL-MCNC: 6.8 G/DL (ref 5.7–8.2)
RBC # BLD AUTO: 3.39 X10(6)UL
SODIUM SERPL-SCNC: 138 MMOL/L (ref 136–145)
WBC # BLD AUTO: 11.9 X10(3) UL (ref 4–11)

## 2025-01-21 PROCEDURE — 71045 X-RAY EXAM CHEST 1 VIEW: CPT | Performed by: HOSPITALIST

## 2025-01-21 PROCEDURE — 99233 SBSQ HOSP IP/OBS HIGH 50: CPT | Performed by: OTHER

## 2025-01-21 PROCEDURE — 99233 SBSQ HOSP IP/OBS HIGH 50: CPT | Performed by: HOSPITALIST

## 2025-01-21 RX ORDER — FAMOTIDINE 20 MG/1
20 TABLET, FILM COATED ORAL DAILY
Status: DISCONTINUED | OUTPATIENT
Start: 2025-01-22 | End: 2025-01-24

## 2025-01-21 RX ORDER — MAGNESIUM OXIDE 400 MG/1
800 TABLET ORAL ONCE
Status: COMPLETED | OUTPATIENT
Start: 2025-01-21 | End: 2025-01-21

## 2025-01-21 RX ORDER — CETIRIZINE HYDROCHLORIDE 5 MG/1
5 TABLET ORAL DAILY
Status: DISCONTINUED | OUTPATIENT
Start: 2025-01-22 | End: 2025-02-05

## 2025-01-21 RX ORDER — ACETAMINOPHEN 500 MG
500 TABLET ORAL EVERY 6 HOURS PRN
Status: DISCONTINUED | OUTPATIENT
Start: 2025-01-21 | End: 2025-02-05

## 2025-01-21 NOTE — PROGRESS NOTES
[facility]  [address] [place] [address], [address]  [facility] [id number]  [phone]            Chester Bautista Patient Status:  Inpatient    1940 MRN [id number]   Location [facility] 3W/SW Attending Richard Guerra MD   Hosp Day # 3 PCP Heike Sorto MD     Subjective:  Chester Bautista is a(n) 84 year old female.    Pain in her right upper extremity improved, she continues to feel slight weakness in her right upper extremity, weakness in right lower extremity improved as she told me.  She has been feeling well overall  P2 Y12 subtherapeutic  Current Facility-Administered Medications   Medication Dose Route Frequency    ticagrelor (Brilinta) tab 90 mg  90 mg Oral Q12H    [START ON 2025] ceFEPIme (Maxipime) 1 g in sodium chloride 0.9% 100 mL IVPB-MBP  1 g Intravenous Q24H    [START ON 2025] cetirizine (ZyrTEC) tab 5 mg  5 mg Oral Daily    [START ON 2025] famotidine (Pepcid) tab 20 mg  20 mg Oral Daily    acetaminophen (Tylenol Extra Strength) tab 500 mg  500 mg Oral Q6H PRN    rosuvastatin (Crestor) tab 40 mg  40 mg Oral Nightly    docusate sodium (Colace) cap 100 mg  100 mg Oral BID    polyethylene glycol (PEG 3350) (Miralax) 17 g oral packet 17 g  17 g Oral Daily PRN    magnesium hydroxide (Milk of Magnesia) 400 MG/5ML oral suspension 30 mL  30 mL Oral Daily PRN    bisacodyl (Dulcolax) 10 MG rectal suppository 10 mg  10 mg Rectal Daily PRN    fleet enema (Fleet) rectal enema 133 mL  1 enema Rectal Once PRN    meclizine (Antivert) tab 12.5 mg  12.5 mg Oral TID PRN    acetaminophen (Tylenol) tab 650 mg  650 mg Oral Q4H PRN    Or    acetaminophen (Tylenol) rectal suppository 650 mg  650 mg Rectal Q4H PRN    hydrALAzine (Apresoline) 20 mg/mL injection 10 mg  10 mg Intravenous Q2H PRN    ondansetron (Zofran) 4 MG/2ML injection 4 mg  4 mg Intravenous Q6H PRN    metoclopramide (Reglan) 5 mg/mL injection 5 mg  5 mg Intravenous Q8H PRN     acetaminophen (Tylenol) tab 650 mg  650 mg Oral Q6H PRN    ondansetron (Zofran) 4 MG/2ML injection 4 mg  4 mg Intravenous Q6H PRN    albuterol (Ventolin) (2.5 MG/3ML) 0.083% nebulizer solution 2.5 mg  2.5 mg Nebulization Q4H PRN    carvedilol (Coreg) tab 12.5 mg  12.5 mg Oral BID with meals    cyclobenzaprine (Flexeril) tab 10 mg  10 mg Oral Nightly PRN    fluticasone-salmeterol (Advair Diskus) 250-50 MCG/ACT inhaler 1 puff  1 puff Inhalation 2 times per day    gabapentin (Neurontin) cap 100 mg  100 mg Oral Nightly    montelukast (Singulair) tab 10 mg  10 mg Oral Nightly    predniSONE (Deltasone) tab 5 mg  5 mg Oral Daily    losartan (Cozaar) tab 50 mg  50 mg Oral Daily    hydroxychloroquine (Plaquenil) tab 300 mg  300 mg Oral Daily    oxyCODONE immediate release tab 5 mg  5 mg Oral Q6H PRN    insulin aspart (NovoLOG) 100 Units/mL FlexPen 1-11 Units  1-11 Units Subcutaneous TID CC    insulin degludec (Tresiba) 100 units/mL flextouch 10 Units  10 Units Subcutaneous Nightly       Objective:  Blood pressure 110/53, pulse 85, temperature 98.4 °F (36.9 °C), temperature source Oral, resp. rate 16, height 61\", weight 135 lb 8 oz (61.5 kg), SpO2 95%.    Physical Exam:  Vitals:    01/21/25 0400 01/21/25 0620 01/21/25 0800 01/21/25 1200   BP: 160/77 135/55 132/50 110/53   Pulse: 93  100 85   Resp: 16  16 16   Temp: 98.2 °F (36.8 °C)  98.4 °F (36.9 °C) 98.4 °F (36.9 °C)   TempSrc: Oral  Oral Oral   SpO2: 100%  92% 95%   Weight: 135 lb 8 oz (61.5 kg)      Height:           General: No apparent distress, well nourished, well groomed.    Neurological examination:  4/5 motor power in distal muscle of her right upper extremity, did not complain of pain on exam today, 5/5 motor power in proximal muscle of her right upper extremity, 4/5 motor power in proximal muscle of her right lower extremity and 5/5 in distal muscle of her right lower extremity    Lab Results   Component Value Date    WBC 11.9 (H) 01/21/2025    HGB 8.4 (L)  01/21/2025    HCT 27.7 (L) 01/21/2025    .0 01/21/2025    CREATSERUM 1.23 (H) 01/21/2025    BUN 20 01/21/2025     01/21/2025    K 4.3 01/21/2025     01/21/2025    CO2 26.0 01/21/2025     (H) 01/21/2025    CA 10.0 01/21/2025    ALB 3.4 01/21/2025    ALKPHO 80 01/21/2025    BILT 0.3 01/21/2025    TP 6.8 01/21/2025    AST 16 01/21/2025    ALT <7 (L) 01/21/2025    PTT 27.0 01/18/2025    INR 0.94 01/17/2025    T4F 1.2 01/17/2025    TSH 0.418 (L) 01/17/2025    DDIMER 1.60 (H) 02/23/2024    ESRML >130 (H) 11/29/2024    CRP 3.00 (H) 11/29/2024    MG 1.4 (L) 01/21/2025    PHOS 3.9 06/07/2024    B12 814 11/01/2024       MRI BRAIN (CPT=70551)    Result Date: 1/20/2025  CONCLUSION:  1. Interval development of a punctate lacunar infarct of the left thalamus, concerning for acute ischemia.  2. Subacute lacunar infarcts of the left cerebellar hemisphere.   3. Postoperative changes of frontal craniotomy and parafalcine aneurysm clipping.  4. Senescent changes of parenchymal volume loss with mild sequela of chronic microvascular ischemic disease.  5. Lesser incidental findings as above.      A preliminary report was issued by the [facility] teleradiology service. There are no major discrepancies.  elm-remote.   Dictated by (CST): Sharaht Gruber MD on 1/20/2025 at 9:24 AM     Finalized by (CST): Sharath Gruber MD on 1/20/2025 at 9:31 AM          XR CHEST AP PORTABLE  (CPT=71045)    Result Date: 1/20/2025  CONCLUSION:  1. Right-sided chest port in place.  2. Cardiomegaly with extensive pulmonary fibrosis.   elm-remote.   Dictated by (CST): Sharath Gruber MD on 1/20/2025 at 6:29 AM     Finalized by (CST): Sharath Gruber MD on 1/20/2025 at 6:32 AM                 Assessment:  Patient Active Problem List   Diagnosis    Adult general medical examination    Vaccine counseling    Colon cancer screening    Type 2 diabetes mellitus without retinopathy (HCC)    Sarcoidosis    Anemia, iron deficiency     Dyslipidemia    Hypercalcemia    Hypertension    Infiltrating ductal carcinoma of right breast, stage 1 (HCC)    Rheumatoid arthritis (HCC)    Osteoarthrosis    Peripheral vascular disease due to secondary diabetes mellitus (HCC)    Neuropathy    AVM (arteriovenous malformation) of duodenum, acquired    Pseudophakia of both eyes    Vitreous floaters of both eyes    Glaucoma suspect of both eyes    Iron deficiency anemia due to chronic blood loss    Malabsorption of iron (HCC)    Port-A-Cath in place    Chronic obstructive pulmonary disease, unspecified (HCC)    Adnexal mass    Encounter for care related to vascular access port    High risk medication use    Meibomian gland dysfunction (MGD) of both eyes    Hypokalemia    Hyperkalemia    Hypomagnesemia    Constipation    Gastric AVM    AVM (arteriovenous malformation) (HCC)    Leukocytosis    Presence of IVC filter    CKD (chronic kidney disease) stage 3, GFR 30-59 ml/min (HCC)    Dry eye syndrome of both eyes    Vitamin D deficiency    Cystic thyroid nodule    Cough    Chronic fatigue    Cerebellar stroke (HCC)    Dizziness    Right hand paresthesia    Thrombus of aorta (HCC)    Bilateral pulmonary embolism (HCC)    Acute cystitis without hematuria    PE (pulmonary thromboembolism) (HCC)    Aortic thrombus (HCC)    New cerebellar infarct (HCC)     Impression and plan:    Cerebellar stroke (HCC)  Left thalamic stroke  MRI of the brain, repeated, revealed a small stroke in the left thalamus, cranial portion  CT angiogram of the head and neck revealed 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  She has a history of AVMs in her duodenum that have been cauterized, maintained on octreotide injections requiring iron transfusions and rarely blood transfusions  Echocardiogram with ejection fraction of 55%, mild hypokinesis of the mid anterolateral wall.   No significant change from previous echocardiogram  Maintained on Plavix 75 mg- P2Y12 subtherapeutic        Recommendations:  1-discussed with vascular surgery-GI consulted  2- Subtherapeutic Plavix will switch to Brillinta  3-continue high-dose rosuvastatin at 40 mg daily  5-PT/OT/ST  6-A1c 10.1 , lipid panel with LDL of 85  7- High risk morbidity/mortality given her GI bleeding history and need for antiplatelet now, potential anticoagulation in the future, this was communicated with family     Complex patient with risk of deterioration        Neurology following     Thank you for allowing me to participate in the care of your patient.       Afia Zavala MD

## 2025-01-21 NOTE — PLAN OF CARE
Problem: HEMATOLOGIC - ADULT  Goal: Free from bleeding injury  Description: (Example usage: patient with low platelets)  INTERVENTIONS:  - Avoid intramuscular injections, enemas and rectal medication administration  - Ensure safe mobilization of patient  - Hold pressure on venipuncture sites to achieve adequate hemostasis  - Assess for signs and symptoms of internal bleeding  - Monitor lab trends  - Patient is to report abnormal signs of bleeding to staff  - Avoid use of toothpicks and dental floss  - Use electric shaver for shaving  - Use soft bristle tooth brush  - Limit straining and forceful nose blowing  1/21/2025 1725 by Nelia Sauer, RN  Outcome: Progressing       Problem: Patient Centered Care  Goal: Patient preferences are identified and integrated in the patient's plan of care  Description: Interventions:  - What would you like us to know as we care for you? From home with daughter   - Provide timely, complete, and accurate information to patient/family  - Incorporate patient and family knowledge, values, beliefs, and cultural backgrounds into the planning and delivery of care  - Encourage patient/family to participate in care and decision-making at the level they choose  - Honor patient and family perspectives and choices  1/21/2025 1725 by Nelia Sauer, RN  Outcome: Progressing       Problem: Diabetes/Glucose Control  Goal: Glucose maintained within prescribed range  Description: INTERVENTIONS:  - Monitor Blood Glucose as ordered  - Assess for signs and symptoms of hyperglycemia and hypoglycemia  - Administer ordered medications to maintain glucose within target range  - Assess barriers to adequate nutritional intake and initiate nutrition consult as needed  - Instruct patient on self management of diabetes  1/21/2025 1725 by Nelia Sauer, RN  Outcome: Progressing       Problem: Patient/Family Goals  Goal: Patient/Family Long Term Goal  Description: Patient's Long Term Goal: discharge from  hospital     Interventions:  - monitor vital signs   - monitor blood glucose levels  - monitor appropriate labs  - pain management   - administer medications per order  - follow MD orders  - diagnostics per order  - update and inform patient and family on plan of care  - discharge planning  - See additional Care Plan goals for specific interventions  1/21/2025 1725 by Nelia Sauer RN  Outcome: Progressing    Goal: Patient/Family Short Term Goal  Description: Patient's Short Term Goal: manage pain     Interventions:   - monitor vital signs   - monitor blood glucose levels  - monitor appropriate labs  - pain management   - administer medications per order  - follow MD orders  - diagnostics per order  - update and inform patient and family on plan of care  - See additional Care Plan goals for specific interventions  1/21/2025 1725 by Nelia Sauer RN  Outcome: Progressing       Problem: CARDIOVASCULAR - ADULT  Goal: Maintains optimal cardiac output and hemodynamic stability  Description: INTERVENTIONS:  - Monitor vital signs, rhythm, and trends  - Monitor for bleeding, hypotension and signs of decreased cardiac output  - Evaluate effectiveness of vasoactive medications to optimize hemodynamic stability  - Monitor arterial and/or venous puncture sites for bleeding and/or hematoma  - Assess quality of pulses, skin color and temperature  - Assess for signs of decreased coronary artery perfusion - ex. Angina  - Evaluate fluid balance, assess for edema, trend weights  1/21/2025 1725 by Nelia Sauer, RN  Outcome: Progressing    Goal: Absence of cardiac arrhythmias or at baseline  Description: INTERVENTIONS:  - Continuous cardiac monitoring, monitor vital signs, obtain 12 lead EKG if indicated  - Evaluate effectiveness of antiarrhythmic and heart rate control medications as ordered  - Initiate emergency measures for life threatening arrhythmias  - Monitor electrolytes and administer replacement therapy as  ordered  1/21/2025 1725 by Nelia Sauer RN  Outcome: Progressing       Problem: METABOLIC/FLUID AND ELECTROLYTES - ADULT  Goal: Glucose maintained within prescribed range  Description: INTERVENTIONS:  - Monitor Blood Glucose as ordered  - Assess for signs and symptoms of hyperglycemia and hypoglycemia  - Administer ordered medications to maintain glucose within target range  - Assess barriers to adequate nutritional intake and initiate nutrition consult as needed  - Instruct patient on self management of diabetes  1/21/2025 1725 by Nelia Sauer RN  Outcome: Progressing    Goal: Hemodynamic stability and optimal renal function maintained  Description: INTERVENTIONS:  - Monitor labs and assess for signs and symptoms of volume excess or deficit  - Monitor intake, output and patient weight  - Monitor urine specific gravity, serum osmolarity and serum sodium as indicated or ordered  - Monitor response to interventions for patient's volume status, including labs, urine output, blood pressure (other measures as available)  - Encourage oral intake as appropriate  - Instruct patient on fluid and nutrition restrictions as appropriate  1/21/2025 1725 by Nelia Sauer RN  Outcome: Progressing       Problem: NEUROLOGICAL - ADULT  Goal: Achieves stable or improved neurological status  Description: INTERVENTIONS  - Assess for and report changes in neurological status  - Initiate measures to prevent increased intracranial pressure  - Maintain blood pressure and fluid volume within ordered parameters to optimize cerebral perfusion and minimize risk of hemorrhage  - Monitor temperature, glucose, and sodium. Initiate appropriate interventions as ordered  1/21/2025 1725 by Neila Sauer RN  Outcome: Progressing    Goal: Achieves maximal functionality and self care  Description: INTERVENTIONS  - Monitor swallowing and airway patency with patient fatigue and changes in neurological status  - Encourage and assist patient  to increase activity and self care with guidance from PT/OT  - Encourage visually impaired, hearing impaired and aphasic patients to use assistive/communication devices  1/21/2025 1725 by Nelia Sauer RN  Outcome: Progressing       Problem: PAIN - ADULT  Goal: Verbalizes/displays adequate comfort level or patient's stated pain goal  Description: INTERVENTIONS:  - Encourage pt to monitor pain and request assistance  - Assess pain using appropriate pain scale  - Administer analgesics based on type and severity of pain and evaluate response  - Implement non-pharmacological measures as appropriate and evaluate response  - Consider cultural and social influences on pain and pain management  - Manage/alleviate anxiety  - Utilize distraction and/or relaxation techniques  - Monitor for opioid side effects  - Notify MD/LIP if interventions unsuccessful or patient reports new pain  - Anticipate increased pain with activity and pre-medicate as appropriate  1/21/2025 1725 by Nelia Sauer RN  Outcome: Progressing       Problem: SAFETY ADULT - FALL  Goal: Free from fall injury  Description: INTERVENTIONS:  - Assess pt frequently for physical needs  - Identify cognitive and physical deficits and behaviors that affect risk of falls.  - [place] fall precautions as indicated by assessment.  - Educate pt/family on patient safety including physical limitations  - Instruct pt to call for assistance with activity based on assessment  - Modify environment to reduce risk of injury  - Provide assistive devices as appropriate  - Consider OT/PT consult to assist with strengthening/mobility  - Encourage toileting schedule  1/21/2025 1725 by Nelia Sauer RN  Outcome: Progressing       Problem: DISCHARGE PLANNING  Goal: Discharge to home or other facility with appropriate resources  Description: INTERVENTIONS:  - Identify barriers to discharge w/pt and caregiver  - Include patient/family/discharge partner in discharge  planning  - Arrange for needed discharge resources and transportation as appropriate  - Identify discharge learning needs (meds, wound care, etc)  - Arrange for interpreters to assist at discharge as needed  - Consider post-discharge preferences of patient/family/discharge partner  - Complete POLST form as appropriate  - Assess patient's ability to be responsible for managing their own health  - Refer to Case Management Department for coordinating discharge planning if the patient needs post-hospital services based on physician/LIP order or complex needs related to functional status, cognitive ability or social support system  1/21/2025 1725 by Nelia Sauer RN  Outcome: Progressing       Problem: SKIN/TISSUE INTEGRITY - ADULT  Goal: Skin integrity remains intact  Description: INTERVENTIONS  - Assess and document risk factors for pressure ulcer development  - Assess and document skin integrity  - Monitor for areas of redness and/or skin breakdown  - Initiate interventions, skin care algorithm/standards of care as needed  1/21/2025 1725 by Nelia Sauer RN  Outcome: Progressing       Problem: MUSCULOSKELETAL - ADULT  Goal: Return mobility to safest level of function  Description: INTERVENTIONS:  - Assess patient stability and activity tolerance for standing, transferring and ambulating w/ or w/o assistive devices  - Assist with transfers and ambulation using safe patient handling equipment as needed  - Ensure adequate protection for wounds/incisions during mobilization  - Obtain PT/OT consults as needed  - Advance activity as appropriate  - Communicate ordered activity level and limitations with patient/family  1/21/2025 1725 by Nelia Sauer, RN  Outcome: Progressing       Problem: Impaired Swallowing  Goal: Minimize aspiration risk  Description: Interventions:  - Patient should be alert and upright for all feedings (90 degrees preferred)  - Offer food and liquids at a slow rate  - No straws  - Encourage  small bites of food and small sips of liquid  - Offer pills one at a time, crush or deliver with applesauce as needed  - Discontinue feeding and notify MD (or speech pathologist) if coughing or persistent throat clearing or wet/gurgly vocal quality is noted  1/21/2025 1725 by Nelia Sauer, RN  Outcome: Progressing    Problem: METABOLIC/FLUID AND ELECTROLYTES - ADULT  Goal: Electrolytes maintained within normal limits  Description: INTERVENTIONS:  - Monitor labs and rhythm and assess patient for signs and symptoms of electrolyte imbalances  - Administer electrolyte replacement as ordered  - Monitor response to electrolyte replacements, including rhythm and repeat lab results as appropriate  - Fluid restriction as ordered  - Instruct patient on fluid and nutrition restrictions as appropriate  1/21/2025 1725 by Nelia Sauer, RN  Outcome: Not Progressing     Patient given magnesium replacement. Neuro checks Q4hr and NIH completed. Chest X-ray completed this evening.

## 2025-01-21 NOTE — DIABETES ED
[facility]    Diabetes Education  Note    Chester Bautista Patient Status:  Inpatient   1940 MRN [id number]  Location [facility] 3W/SW Attending Richard Guerra MD  Hosp Day # 3 PCP Heike Sorto MD      Labs:    HEMOGLOBIN A1C (%)   Date Value   2025 9.7 (A)     HgbA1C (%)   Date Value   2025 10.1 (H)         Reason for Visit:Elevated A1c value    Met with patient in room to discuss elevated lab value.  She states she wears a CGM device (currently off) to assist with glucose control.   She states she typically consumes 2 meals per day and is aware of certain foods that elevated her blood sugar.  She states she has assistance from family with meal planning.  Provided her with Balanced meal handout and reviewed with her.  She states she has experiencing lower glucose value in the overnight hours.  She has hypoglycemia unawareness.  Encouraged her to perform fingerstick when glucose values are low on Joselyn device to confirm hypoglycemia.  She treats her low blood sugar with regular soda. States she was advised by MD to decrease long acting insulin and take medication in the morning instead of at bedtime to assist with hypoglycemia.    Encouraged her to continue to monitor blood sugar, consuming nutrient dense type foods and avoid concentrated sweets.        Education Provided:  Hypoglycemia symptoms/treatment/prevention  Basic Diet Guidelines    Importance of close follow up with PCP and medical team  Benefits of physical activity     Patient verbalized understanding and was receptive to information provided.      Recommendations:  Patient to self-administer all insulin doses with RN supervision   Attend outpatient diabetes education          Maribel Chavez RN  Diabetes Educator  2025  1:29 PM

## 2025-01-21 NOTE — PLAN OF CARE
Alert and oriented x4. 1-2L NC overnight. Assist w/walker. Inc. w/purewick in place. Headache pain treated with prn PO oxycodone. IV cefepime for positive urine cultures. Q4 neuros with right-sided deficits. Elevated BP treated w/prn hydralazine IVP. Frequent rounding.     Problem: HEMATOLOGIC - ADULT  Goal: Free from bleeding injury  Description: (Example usage: patient with low platelets)  INTERVENTIONS:  - Avoid intramuscular injections, enemas and rectal medication administration  - Ensure safe mobilization of patient  - Hold pressure on venipuncture sites to achieve adequate hemostasis  - Assess for signs and symptoms of internal bleeding  - Monitor lab trends  - Patient is to report abnormal signs of bleeding to staff  - Avoid use of toothpicks and dental floss  - Use electric shaver for shaving  - Use soft bristle tooth brush  - Limit straining and forceful nose blowing  Outcome: Progressing     Problem: Patient Centered Care  Goal: Patient preferences are identified and integrated in the patient's plan of care  Description: Interventions:  - What would you like us to know as we care for you? From home with daughter   - Provide timely, complete, and accurate information to patient/family  - Incorporate patient and family knowledge, values, beliefs, and cultural backgrounds into the planning and delivery of care  - Encourage patient/family to participate in care and decision-making at the level they choose  - Honor patient and family perspectives and choices  Outcome: Progressing     Problem: Diabetes/Glucose Control  Goal: Glucose maintained within prescribed range  Description: INTERVENTIONS:  - Monitor Blood Glucose as ordered  - Assess for signs and symptoms of hyperglycemia and hypoglycemia  - Administer ordered medications to maintain glucose within target range  - Assess barriers to adequate nutritional intake and initiate nutrition consult as needed  - Instruct patient on self management of  diabetes  Outcome: Progressing     Problem: METABOLIC/FLUID AND ELECTROLYTES - ADULT  Goal: Glucose maintained within prescribed range  Description: INTERVENTIONS:  - Monitor Blood Glucose as ordered  - Assess for signs and symptoms of hyperglycemia and hypoglycemia  - Administer ordered medications to maintain glucose within target range  - Assess barriers to adequate nutritional intake and initiate nutrition consult as needed  - Instruct patient on self management of diabetes  Outcome: Progressing  Goal: Electrolytes maintained within normal limits  Description: INTERVENTIONS:  - Monitor labs and rhythm and assess patient for signs and symptoms of electrolyte imbalances  - Administer electrolyte replacement as ordered  - Monitor response to electrolyte replacements, including rhythm and repeat lab results as appropriate  - Fluid restriction as ordered  - Instruct patient on fluid and nutrition restrictions as appropriate  Outcome: Progressing  Goal: Hemodynamic stability and optimal renal function maintained  Description: INTERVENTIONS:  - Monitor labs and assess for signs and symptoms of volume excess or deficit  - Monitor intake, output and patient weight  - Monitor urine specific gravity, serum osmolarity and serum sodium as indicated or ordered  - Monitor response to interventions for patient's volume status, including labs, urine output, blood pressure (other measures as available)  - Encourage oral intake as appropriate  - Instruct patient on fluid and nutrition restrictions as appropriate  Outcome: Progressing     Problem: NEUROLOGICAL - ADULT  Goal: Achieves stable or improved neurological status  Description: INTERVENTIONS  - Assess for and report changes in neurological status  - Initiate measures to prevent increased intracranial pressure  - Maintain blood pressure and fluid volume within ordered parameters to optimize cerebral perfusion and minimize risk of hemorrhage  - Monitor temperature, glucose,  and sodium. Initiate appropriate interventions as ordered  Outcome: Progressing  Goal: Achieves maximal functionality and self care  Description: INTERVENTIONS  - Monitor swallowing and airway patency with patient fatigue and changes in neurological status  - Encourage and assist patient to increase activity and self care with guidance from PT/OT  - Encourage visually impaired, hearing impaired and aphasic patients to use assistive/communication devices  Outcome: Progressing     Problem: SKIN/TISSUE INTEGRITY - ADULT  Goal: Skin integrity remains intact  Description: INTERVENTIONS  - Assess and document risk factors for pressure ulcer development  - Assess and document skin integrity  - Monitor for areas of redness and/or skin breakdown  - Initiate interventions, skin care algorithm/standards of care as needed  Outcome: Progressing     Problem: MUSCULOSKELETAL - ADULT  Goal: Return mobility to safest level of function  Description: INTERVENTIONS:  - Assess patient stability and activity tolerance for standing, transferring and ambulating w/ or w/o assistive devices  - Assist with transfers and ambulation using safe patient handling equipment as needed  - Ensure adequate protection for wounds/incisions during mobilization  - Obtain PT/OT consults as needed  - Advance activity as appropriate  - Communicate ordered activity level and limitations with patient/family  Outcome: Progressing     Problem: PAIN - ADULT  Goal: Verbalizes/displays adequate comfort level or patient's stated pain goal  Description: INTERVENTIONS:  - Encourage pt to monitor pain and request assistance  - Assess pain using appropriate pain scale  - Administer analgesics based on type and severity of pain and evaluate response  - Implement non-pharmacological measures as appropriate and evaluate response  - Consider cultural and social influences on pain and pain management  - Manage/alleviate anxiety  - Utilize distraction and/or relaxation  techniques  - Monitor for opioid side effects  - Notify MD/LIP if interventions unsuccessful or patient reports new pain  - Anticipate increased pain with activity and pre-medicate as appropriate  Outcome: Progressing     Problem: SAFETY ADULT - FALL  Goal: Free from fall injury  Description: INTERVENTIONS:  - Assess pt frequently for physical needs  - Identify cognitive and physical deficits and behaviors that affect risk of falls.  - Raymond fall precautions as indicated by assessment.  - Educate pt/family on patient safety including physical limitations  - Instruct pt to call for assistance with activity based on assessment  - Modify environment to reduce risk of injury  - Provide assistive devices as appropriate  - Consider OT/PT consult to assist with strengthening/mobility  - Encourage toileting schedule  Outcome: Progressing     Problem: DISCHARGE PLANNING  Goal: Discharge to home or other facility with appropriate resources  Description: INTERVENTIONS:  - Identify barriers to discharge w/pt and caregiver  - Include patient/family/discharge partner in discharge planning  - Arrange for needed discharge resources and transportation as appropriate  - Identify discharge learning needs (meds, wound care, etc)  - Arrange for interpreters to assist at discharge as needed  - Consider post-discharge preferences of patient/family/discharge partner  - Complete POLST form as appropriate  - Assess patient's ability to be responsible for managing their own health  - Refer to Case Management Department for coordinating discharge planning if the patient needs post-hospital services based on physician/LIP order or complex needs related to functional status, cognitive ability or social support system  Outcome: Progressing

## 2025-01-21 NOTE — PROGRESS NOTES
[facility]  part of [facility]    Progress Note    Chester Bautista Patient Status:  Inpatient    1940 MRN [id number]   Location [facility] 3W/SW Attending Richard Guerra MD   Hosp Day # 3 PCP Heike Sorto MD       Subjective:   Chester Bautista is a(n) 84 year old female was seen and examined  No acute events overnight, had a brief episode of sob earlier  Currently resting in bed, comfortably   No cp, sob, f,c,n,v, abd pain or HA     Objective:   Blood pressure 110/53, pulse 85, temperature 98.4 °F (36.9 °C), temperature source Oral, resp. rate 16, height 5' 1\" (1.549 m), weight 135 lb 8 oz (61.5 kg), SpO2 95%.    General: AAF, NAD  HEENT: Normocephalic atraumatic. Moist mucous membranes.   Respiratory: Clear to auscultation bilaterally. No wheezes. No rhonchi.  Cardiovascular: RRR  Abdomen: Soft, nontender, nondistended.  Neurologic: No focal neurological deficits.   Musculoskeletal: Moves all extremities.  Extremities: No edema or cyanosis.  Integument: No rashes or lesions.   Psychiatric: Appropriate mood and affect.    Current Inpatient Medications:     Current Facility-Administered Medications:     ticagrelor (Brilinta) tab 90 mg, 90 mg, Oral, Q12H    [START ON 2025] ceFEPIme (Maxipime) 1 g in sodium chloride 0.9% 100 mL IVPB-MBP, 1 g, Intravenous, Q24H    [START ON 2025] cetirizine (ZyrTEC) tab 5 mg, 5 mg, Oral, Daily    [START ON 2025] famotidine (Pepcid) tab 20 mg, 20 mg, Oral, Daily    acetaminophen (Tylenol Extra Strength) tab 500 mg, 500 mg, Oral, Q6H PRN    rosuvastatin (Crestor) tab 40 mg, 40 mg, Oral, Nightly    docusate sodium (Colace) cap 100 mg, 100 mg, Oral, BID    polyethylene glycol (PEG 3350) (Miralax) 17 g oral packet 17 g, 17 g, Oral, Daily PRN    magnesium hydroxide (Milk of Magnesia) 400 MG/5ML oral suspension 30 mL, 30 mL, Oral, Daily PRN    bisacodyl (Dulcolax) 10 MG rectal suppository 10 mg, 10 mg, Rectal, Daily  PRN    fleet enema (Fleet) rectal enema 133 mL, 1 enema, Rectal, Once PRN    meclizine (Antivert) tab 12.5 mg, 12.5 mg, Oral, TID PRN    acetaminophen (Tylenol) tab 650 mg, 650 mg, Oral, Q4H PRN **OR** acetaminophen (Tylenol) rectal suppository 650 mg, 650 mg, Rectal, Q4H PRN    hydrALAzine (Apresoline) 20 mg/mL injection 10 mg, 10 mg, Intravenous, Q2H PRN    ondansetron (Zofran) 4 MG/2ML injection 4 mg, 4 mg, Intravenous, Q6H PRN    metoclopramide (Reglan) 5 mg/mL injection 5 mg, 5 mg, Intravenous, Q8H PRN    acetaminophen (Tylenol) tab 650 mg, 650 mg, Oral, Q6H PRN    ondansetron (Zofran) 4 MG/2ML injection 4 mg, 4 mg, Intravenous, Q6H PRN    albuterol (Ventolin) (2.5 MG/3ML) 0.083% nebulizer solution 2.5 mg, 2.5 mg, Nebulization, Q4H PRN    carvedilol (Coreg) tab 12.5 mg, 12.5 mg, Oral, BID with meals    cyclobenzaprine (Flexeril) tab 10 mg, 10 mg, Oral, Nightly PRN    fluticasone-salmeterol (Advair Diskus) 250-50 MCG/ACT inhaler 1 puff, 1 puff, Inhalation, 2 times per day    gabapentin (Neurontin) cap 100 mg, 100 mg, Oral, Nightly    montelukast (Singulair) tab 10 mg, 10 mg, Oral, Nightly    predniSONE (Deltasone) tab 5 mg, 5 mg, Oral, Daily    losartan (Cozaar) tab 50 mg, 50 mg, Oral, Daily    hydroxychloroquine (Plaquenil) tab 300 mg, 300 mg, Oral, Daily    oxyCODONE immediate release tab 5 mg, 5 mg, Oral, Q6H PRN    insulin aspart (NovoLOG) 100 Units/mL FlexPen 1-11 Units, 1-11 Units, Subcutaneous, TID CC    insulin degludec (Tresiba) 100 units/mL flextouch 10 Units, 10 Units, Subcutaneous, Nightly    Assessment and Plan:   #Left Cerebellar Infarcts  - pt with new sudden onset neuro changes  - 2 small cerebellar infarcts seen on admission MRI  -Neurology consulted  -Patient already took 3 baby aspirin's prior to coming to ED   -Neurology consulted  - CTA negative for LVO, but showing small subsegmental Pes  -f/u lipid panel, A1c, TSH, ECHO   -Continue statin, will add plavix -> P2Y12 subtherapeutic, will  switch to brilinta   -PT/OT/ST eval  -stroke alert called 1/19 -> stroke CT non-acute, MRI showing small CVA in L thalamus   -statin increased to 40 mg  -cv surgery to evaluate  -> scheduled for LUE, aortic arch and cerebral angiogram with possible intervention on Friday    #Dizziness  Improved   -Likely in setting of above  -Meclizine prn   -Neurology consulted     #PE   -Patient is hemodynamically stable, EKG NSR and troponin not elevated.  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 Already took 3 baby aspirins prior to admission, will hold off further AC for now in setting of prior Anemia and GI AVMs   -Previously had IVC filter placed in March 2024 for PE at that time (removed June 2024).   -Needs eval for hypercoagulable state. Risk factors of recent car ride, overall sedentary state, breast cancer. Sees Dr. 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     Results:     Recent Labs   Lab 01/19/25  0629 01/20/25  1240 01/21/25  0649   RBC 3.30* 3.68* 3.39*   HGB 8.2* 9.0* 8.4*   HCT 26.6* 30.1* 27.7*   MCV 80.6 81.8 81.7   MCH 24.8* 24.5* 24.8*   MCHC 30.8* 29.9* 30.3*   RDW 17.3* 18.2* 17.9*   NEPRELIM 11.91* 7.28 7.85*   WBC 15.5* 9.9 11.9*   .0 193.0 348.0         Recent  Labs   Lab 01/19/25  0629 01/19/25  1525 01/20/25  1241 01/21/25  0649   *  --  140* 189*   BUN 15  --  12 20   CREATSERUM 0.89  --  1.07* 1.23*   EGFRCR 63  --  51* 43*   CA 9.7  --  10.7* 10.0     --  137 138   K 3.5 4.8 4.4 4.3     --  106 104   CO2 26.0  --  20.0* 26.0         Imaging:   MRI BRAIN (CPT=70551)    Result Date: 1/20/2025  CONCLUSION:  1. Interval development of a punctate lacunar infarct of the left thalamus, concerning for acute ischemia.  2. Subacute lacunar infarcts of the left cerebellar hemisphere.   3. Postoperative changes of frontal craniotomy and parafalcine aneurysm clipping.  4. Senescent changes of parenchymal volume loss with mild sequela of chronic microvascular ischemic disease.  5. Lesser incidental findings as above.      A preliminary report was issued by the [facility] Radiology teleradiology service. There are no major discrepancies.  elm-remote.   Dictated by (CST): Sharath Gruber MD on 1/20/2025 at 9:24 AM     Finalized by (CST): Sharath Gruber MD on 1/20/2025 at 9:31 AM          XR CHEST AP PORTABLE  (CPT=71045)    Result Date: 1/20/2025  CONCLUSION:  1. Right-sided chest port in place.  2. Cardiomegaly with extensive pulmonary fibrosis.   elm-remote.   Dictated by (CST): Sharath Gruber MD on 1/20/2025 at 6:29 AM     Finalized by (CST): Sharath Gruber MD on 1/20/2025 at 6:32 AM                 Richard Guerra MD  1/21/2025

## 2025-01-21 NOTE — PHYSICAL THERAPY NOTE
Physical Therapy Contact Note    Orders received, chart reviewed. Attempted to see patient for Physical Therapy services, patient declining participation in PT at this time due to dizziness. Educated patient on importance of participation in therapy, continues to decline. RN informed. Will re-schedule visit.    Alec Adams, SPT    I provided clinical instruction and supervision and agree with the above documentation.     Kristina Reed, PT, DPT

## 2025-01-22 LAB
ALBUMIN SERPL-MCNC: 3.5 G/DL (ref 3.2–4.8)
ANION GAP SERPL CALC-SCNC: 6 MMOL/L (ref 0–18)
BASOPHILS # BLD AUTO: 0.07 X10(3) UL (ref 0–0.2)
BASOPHILS NFR BLD AUTO: 0.6 %
BUN BLD-MCNC: 23 MG/DL (ref 9–23)
BUN/CREAT SERPL: 18.4 (ref 10–20)
CALCIUM BLD-MCNC: 10.3 MG/DL (ref 8.7–10.4)
CHLORIDE SERPL-SCNC: 107 MMOL/L (ref 98–112)
CO2 SERPL-SCNC: 26 MMOL/L (ref 21–32)
CREAT BLD-MCNC: 1.25 MG/DL
DEPRECATED RDW RBC AUTO: 50.9 FL (ref 35.1–46.3)
EGFRCR SERPLBLD CKD-EPI 2021: 42 ML/MIN/1.73M2 (ref 60–?)
EOSINOPHIL # BLD AUTO: 0.44 X10(3) UL (ref 0–0.7)
EOSINOPHIL NFR BLD AUTO: 4 %
ERYTHROCYTE [DISTWIDTH] IN BLOOD BY AUTOMATED COUNT: 17.8 % (ref 11–15)
GLUCOSE BLD-MCNC: 185 MG/DL (ref 70–99)
GLUCOSE BLDC GLUCOMTR-MCNC: 144 MG/DL (ref 70–99)
GLUCOSE BLDC GLUCOMTR-MCNC: 254 MG/DL (ref 70–99)
GLUCOSE BLDC GLUCOMTR-MCNC: 300 MG/DL (ref 70–99)
GLUCOSE BLDC GLUCOMTR-MCNC: 323 MG/DL (ref 70–99)
HCT VFR BLD AUTO: 26.4 %
HGB BLD-MCNC: 7.8 G/DL
IMM GRANULOCYTES # BLD AUTO: 0.08 X10(3) UL (ref 0–1)
IMM GRANULOCYTES NFR BLD: 0.7 %
LYMPHOCYTES # BLD AUTO: 2.22 X10(3) UL (ref 1–4)
LYMPHOCYTES NFR BLD AUTO: 20.1 %
MAGNESIUM SERPL-MCNC: 1.6 MG/DL (ref 1.6–2.6)
MCH RBC QN AUTO: 23.2 PG (ref 26–34)
MCHC RBC AUTO-ENTMCNC: 29.5 G/DL (ref 31–37)
MCV RBC AUTO: 78.6 FL
MONOCYTES # BLD AUTO: 1.26 X10(3) UL (ref 0.1–1)
MONOCYTES NFR BLD AUTO: 11.4 %
NEUTROPHILS # BLD AUTO: 6.98 X10 (3) UL (ref 1.5–7.7)
NEUTROPHILS # BLD AUTO: 6.98 X10(3) UL (ref 1.5–7.7)
NEUTROPHILS NFR BLD AUTO: 63.2 %
OSMOLALITY SERPL CALC.SUM OF ELEC: 296 MOSM/KG (ref 275–295)
PHOSPHATE SERPL-MCNC: 4 MG/DL (ref 2.4–5.1)
PLATELET # BLD AUTO: 376 10(3)UL (ref 150–450)
POTASSIUM SERPL-SCNC: 3.7 MMOL/L (ref 3.5–5.1)
RBC # BLD AUTO: 3.36 X10(6)UL
SODIUM SERPL-SCNC: 139 MMOL/L (ref 136–145)
WBC # BLD AUTO: 11.1 X10(3) UL (ref 4–11)

## 2025-01-22 PROCEDURE — 99233 SBSQ HOSP IP/OBS HIGH 50: CPT | Performed by: OTHER

## 2025-01-22 PROCEDURE — 99233 SBSQ HOSP IP/OBS HIGH 50: CPT | Performed by: INTERNAL MEDICINE

## 2025-01-22 RX ORDER — SENNOSIDES 8.6 MG
8.6 TABLET ORAL 2 TIMES DAILY
Status: DISCONTINUED | OUTPATIENT
Start: 2025-01-22 | End: 2025-02-05

## 2025-01-22 RX ORDER — POTASSIUM CHLORIDE 1500 MG/1
40 TABLET, EXTENDED RELEASE ORAL ONCE
Status: COMPLETED | OUTPATIENT
Start: 2025-01-22 | End: 2025-01-22

## 2025-01-22 RX ORDER — LORAZEPAM 0.5 MG/1
0.5 TABLET ORAL AS NEEDED
Status: COMPLETED | OUTPATIENT
Start: 2025-01-22 | End: 2025-01-26

## 2025-01-22 RX ORDER — MAGNESIUM OXIDE 400 MG/1
400 TABLET ORAL ONCE
Status: COMPLETED | OUTPATIENT
Start: 2025-01-22 | End: 2025-01-22

## 2025-01-22 RX ORDER — ROSUVASTATIN CALCIUM 10 MG/1
10 TABLET, COATED ORAL NIGHTLY
Status: DISCONTINUED | OUTPATIENT
Start: 2025-01-22 | End: 2025-02-05

## 2025-01-22 NOTE — PROGRESS NOTES
[facility]  part of [facility]    Progress Note    Chester Bautista Patient Status:  Inpatient    1940 MRN [id number]   Location [facility] 3W/SW Attending Richard Guerra MD   Hosp Day # 4 PCP Heike Sorto MD       Subjective:   Sitting up comfortably and in NAD.   Denied any active complaints at the time of interview.  Vascular surgery scheduled for Fr[place]    Objective:   Blood pressure 128/58, pulse 101, temperature 98.2 °F (36.8 °C), temperature source Oral, resp. rate 19, height 5' 1\" (1.549 m), weight 135 lb 11.2 oz (61.6 kg), SpO2 99%.    General: AAF, NAD  Respiratory: Clear to auscultation bilaterally. No wheezes. No rhonchi.  Cardiovascular: RRR  Abdomen: Soft, nontender, nondistended.  Neurologic: No focal neurological deficits.   Musculoskeletal: Moves all extremities.  Extremities: No edema or cyanosis.    Current Inpatient Medications:     Current Facility-Administered Medications:     rosuvastatin (Crestor) tab 10 mg, 10 mg, Oral, Nightly    ticagrelor (Brilinta) tab 90 mg, 90 mg, Oral, Q12H    ceFEPIme (Maxipime) 1 g in sodium chloride 0.9% 100 mL IVPB-MBP, 1 g, Intravenous, Q24H    cetirizine (ZyrTEC) tab 5 mg, 5 mg, Oral, Daily    famotidine (Pepcid) tab 20 mg, 20 mg, Oral, Daily    acetaminophen (Tylenol Extra Strength) tab 500 mg, 500 mg, Oral, Q6H PRN    docusate sodium (Colace) cap 100 mg, 100 mg, Oral, BID    polyethylene glycol (PEG 3350) (Miralax) 17 g oral packet 17 g, 17 g, Oral, Daily PRN    magnesium hydroxide (Milk of Magnesia) 400 MG/5ML oral suspension 30 mL, 30 mL, Oral, Daily PRN    bisacodyl (Dulcolax) 10 MG rectal suppository 10 mg, 10 mg, Rectal, Daily PRN    fleet enema (Fleet) rectal enema 133 mL, 1 enema, Rectal, Once PRN    meclizine (Antivert) tab 12.5 mg, 12.5 mg, Oral, TID PRN    acetaminophen (Tylenol) tab 650 mg, 650 mg, Oral, Q4H PRN **OR** acetaminophen (Tylenol) rectal suppository 650 mg, 650 mg, Rectal, Q4H PRN     hydrALAzine (Apresoline) 20 mg/mL injection 10 mg, 10 mg, Intravenous, Q2H PRN    ondansetron (Zofran) 4 MG/2ML injection 4 mg, 4 mg, Intravenous, Q6H PRN    metoclopramide (Reglan) 5 mg/mL injection 5 mg, 5 mg, Intravenous, Q8H PRN    acetaminophen (Tylenol) tab 650 mg, 650 mg, Oral, Q6H PRN    ondansetron (Zofran) 4 MG/2ML injection 4 mg, 4 mg, Intravenous, Q6H PRN    albuterol (Ventolin) (2.5 MG/3ML) 0.083% nebulizer solution 2.5 mg, 2.5 mg, Nebulization, Q4H PRN    carvedilol (Coreg) tab 12.5 mg, 12.5 mg, Oral, BID with meals    cyclobenzaprine (Flexeril) tab 10 mg, 10 mg, Oral, Nightly PRN    fluticasone-salmeterol (Advair Diskus) 250-50 MCG/ACT inhaler 1 puff, 1 puff, Inhalation, 2 times per day    gabapentin (Neurontin) cap 100 mg, 100 mg, Oral, Nightly    montelukast (Singulair) tab 10 mg, 10 mg, Oral, Nightly    predniSONE (Deltasone) tab 5 mg, 5 mg, Oral, Daily    losartan (Cozaar) tab 50 mg, 50 mg, Oral, Daily    hydroxychloroquine (Plaquenil) tab 300 mg, 300 mg, Oral, Daily    oxyCODONE immediate release tab 5 mg, 5 mg, Oral, Q6H PRN    insulin aspart (NovoLOG) 100 Units/mL FlexPen 1-11 Units, 1-11 Units, Subcutaneous, TID CC    insulin degludec (Tresiba) 100 units/mL flextouch 10 Units, 10 Units, Subcutaneous, Nightly      Results:     Recent Labs   Lab 01/20/25  1240 01/21/25  0649 01/22/25  0659   RBC 3.68* 3.39* 3.36*   HGB 9.0* 8.4* 7.8*   HCT 30.1* 27.7* 26.4*   MCV 81.8 81.7 78.6*   MCH 24.5* 24.8* 23.2*   MCHC 29.9* 30.3* 29.5*   RDW 18.2* 17.9* 17.8*   NEPRELIM 7.28 7.85* 6.98   WBC 9.9 11.9* 11.1*   .0 348.0 376.0         Recent Labs   Lab 01/20/25  1241 01/21/25  0649 01/22/25  0659   * 189* 185*   BUN 12 20 23   CREATSERUM 1.07* 1.23* 1.25*   EGFRCR 51* 43* 42*   CA 10.7* 10.0 10.3    138 139   K 4.4 4.3 3.7    104 107   CO2 20.0* 26.0 26.0         Imaging:   XR CHEST AP/PA (1 VIEW) (CPT=71045)    Result Date: 1/21/2025  CONCLUSION:  1. No significant change. 2.  Pulmonary fibrosis. 3. Cardiomegaly.    Dictated by (CST): Michael Gonzalez MD on 1/21/2025 at 5:23 PM     Finalized by (CST): Michael Gonzalez MD on 1/21/2025 at 5:25 PM                 Assessment and Plan:   #Left Cerebellar Infarcts  - pt with new sudden onset neuro changes  - 2 small cerebellar infarcts seen on admission MRI  -Patient already took 3 baby aspirin's prior to coming to ED   - CTA negative for LVO, but showing small subsegmental Pes  - Sstroke alert called 1/19 -> stroke CT non-acute, MRI showing small CVA in L thalamus   - f/u lipid panel, A1c, TSH, ECHO   - Neuro on consult  -Continue statin, will add plavix -> P2Y12 subtherapeutic, will switch to brilinta   -PT/OT/ST eval  -cv surgery on consult  -scheduled for LUE, aortic arch and cerebral angiogram with possible intervention on Friday    #Dizziness  Improved   -Likely in setting of above  -Meclizine prn   -Neurology consulted     #PE   -Patient is hemodynamically stable, EKG NSR and troponin not elevated.  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 Already took 3 baby aspirins prior to admission  -Previously had IVC filter placed in March 2024 for PE at that time (removed June 2024).   -Needs eval for hypercoagulable state. Risk factors of recent car ride, overall sedentary state, breast cancer. Sees Dr. Cade Chu in clinic for anemia, needs further follow up   -given current presentation the patient was restarted on AC, close Hb monitoring     #Descending aortic arch thrombus   -Admission CTA chest revealing mural thrombus throughout the thoracic and abdominal aorta, chronic per radiology  -Vascular surgery on consult  -planning for angiogram 01/24/2025  -cont brilinta and statin for now      #HTN  -Hold home antihypertensives to allow for permissive hypertension poststroke     #Klebsiella UTI  -nitrofurantoin initiated in ED,   -Ucx reviewed - nitrofurantoin resistant  -started on IV  Cefepime    #Constipation  -bowel regimen ordered     #Anemia  -in the setting of PE and aortic arch thrombus, the decision was made to resume anticoagulation despite the patients history of bleeding AVMs  -monitor H&H  -transfuse as indicated     Chronic Medical Problems  HTN  HLD  DM  COPD/?sarcoidosis   Rheumatoid Arthritis  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

## 2025-01-22 NOTE — PHYSICAL THERAPY NOTE
PHYSICAL THERAPY TREATMENT NOTE - INPATIENT   RRT , Acute Infarct L [place]    Room Number: 319/319-A       Presenting Problem: cerebellar CVA  Co-Morbidities : COPD, diabetes, HTN, RA, GI bleed    Problem List  Principal Problem:    Cerebellar stroke (HCC)  Active Problems:    Dizziness    Right hand paresthesia    Thrombus of aorta (HCC)    Bilateral pulmonary embolism (HCC)    Acute cystitis without hematuria      PHYSICAL THERAPY ASSESSMENT   Patient demonstrates fair progress this session, goals  remain in progress.      Patient is requiring contact guard assist as a result of the following impairments: medical status.     Patient continues to function below baseline with bed mobility, transfers, gait, stair negotiation, standing prolonged periods, and performing household tasks.  Next session anticipate patient to progress bed mobility, transfers, gait, stair negotiation, standing prolonged periods, and performing household tasks.  Physical Therapy will continue to follow patient for duration of hospitalization.    Patient continues to benefit from continued skilled PT services: at discharge to promote prior level of function and safety with additional support (24h) and return home with home health PT.    PLAN DURING HOSPITALIZATION  Nursing Mobility Recommendation : 1 Assist  PT Device Recommendation: Rolling walker;Gait belt  PT Treatment Plan: Coordination;Endurance;Energy conservation;Patient education;Gait training;Neuromuscular re-educate;Strengthening;Balance training  Frequency (Obs): 5x/week     SUBJECTIVE  \"Lets do this quick, doctor coming soon and he more important than you\"    OBJECTIVE  Precautions: Bed/chair alarm    PAIN ASSESSMENT   Ratin  Location: denies    BALANCE  Static Sitting: Good  Dynamic Sitting: Fair +  Static Standing: Good  Dynamic Standing: Fair +    ACTIVITY TOLERANCE  BP: 128/58 (128/58 at rest, 126/64 post activity)  BP Location: Left arm  BP Method:  Automatic  Patient Position: Sitting    AM-PAC '6-Clicks' INPATIENT SHORT FORM - BASIC MOBILITY  How much difficulty does the patient currently have...  Patient Difficulty: Turning over in bed (including adjusting bedclothes, sheets and blankets)?: None   Patient Difficulty: Sitting down on and standing up from a chair with arms (e.g., wheelchair, bedside commode, etc.): None   Patient Difficulty: Moving from lying on back to sitting on the side of the bed?: None   How much help from another person does the patient currently need...   Help from Another: Moving to and from a bed to a chair (including a wheelchair)?: A Little   Help from Another: Need to walk in hospital room?: None   Help from Another: Climbing 3-5 steps with a railing?: A Little     AM-PAC Score:  Raw Score: 22   Approx Degree of Impairment: 20.91%   Standardized Score (AM-PAC Scale): 53.28   CMS Modifier (G-Code): CJ    FUNCTIONAL ABILITY STATUS  Functional Mobility/Gait Assessment  Gait Assistance: Contact guard assist  Distance (ft): 25  Assistive Device: Rolling walker  Pattern: R Decreased stance time;L Decreased stance time;Compensated trendelenburg;L Foot flat;R Foot flat (decreased step length, slow gait, UE support on RW)  Rolling: not tested  Supine to Sit: not tested  Sit to Supine: not tested  Sit to Stand: stand-by assist    Skilled Therapy Provided: Patient received seated in chair at initiation of session agreeable to participation in PT. Patient tolerates treatment fairly. Session overall limited by dizziness. Pt vitals are WNL pre and post activity. Pt able to perform STS on her own with SBA. Ambulated 25ft with RW and CGA-SBA, reported increased dizziness at end. Pt reports that she may be able to live on main level. Demonstrates decreased level of physical assistance for functional mobility in comparison to previous session. Patient educated on importance of assistance at home as well as not negotiating stairs without proper help,  verbalizes understanding. Patient left seated in chair, alarm engaged, lines intact, needs in reach and handoff to RN.    The patient's Approx Degree of Impairment: 20.91% has been calculated based on documentation in the [facility] '6 clicks' Inpatient Daily Activity Short Form.  Research supports that patients with this level of impairment may benefit from home with increased assistance (24h) and  PT.  Final disposition will be made by interdisciplinary medical team.      Patient End of Session: Up in chair;Needs met;Call light within reach;RN aware of session/findings;All patient questions and concerns addressed;Hospital anti-slip socks;Alarm set    CURRENT GOALS   Goals to be met by: 25  Patient Goal Patient's self-stated goal is: go home, be stronger   Goal #1 Patient is able to demonstrate supine - sit EOB @ level: modified independent     Goal #1   Current Status progressing   Goal #2 Patient is able to demonstrate transfers Sit to/from Stand at assistance level: modified independent with walker - rolling     Goal #2  Current Status progressing   Goal #3 Patient is able to ambulate 150 feet with assist device: walker - rolling at assistance level: supervision   Goal #3   Current Status progressing   Goal #4 Patient will negotiate 10 stairs with rail and supervision   Goal #4   Current Status Not tested   Goal #5 Patient to demonstrate independence with home activity/exercise instructions provided to patient in preparation for discharge.   Goal #5   Current Status progressing   Goal #6    Goal #6  Current Status      Gait Trainin minutes  Therapeutic Activity: 10 minutes    Alec Adams, ISMAEL    I provided clinical instruction and supervision for the duration of this session and agree with the above documentation.     Kristina Reed, PT, DPT

## 2025-01-22 NOTE — PROGRESS NOTES
[facility]  [address] [place] [address], [address]  [facility] [address]  [phone]            Chester Bautista Patient Status:  Inpatient    1940 MRN [id number]   Location [facility] 3W/SW Attending Bonny Carranza MD   Hosp Day # 4 PCP Heike Sorto MD     Subjective:  Chester Bautista is a(n) 84 year old female.  Doing well today, sitting in her chair, denied any pain for me, she believes she had recovered with her right upper extremity, right lower extremity mildly weak no dizziness    Current Facility-Administered Medications   Medication Dose Route Frequency    rosuvastatin (Crestor) tab 10 mg  10 mg Oral Nightly    sennosides (Senokot) tab 8.6 mg  8.6 mg Oral BID    ticagrelor (Brilinta) tab 90 mg  90 mg Oral Q12H    ceFEPIme (Maxipime) 1 g in sodium chloride 0.9% 100 mL IVPB-MBP  1 g Intravenous Q24H    cetirizine (ZyrTEC) tab 5 mg  5 mg Oral Daily    famotidine (Pepcid) tab 20 mg  20 mg Oral Daily    acetaminophen (Tylenol Extra Strength) tab 500 mg  500 mg Oral Q6H PRN    docusate sodium (Colace) cap 100 mg  100 mg Oral BID    polyethylene glycol (PEG 3350) (Miralax) 17 g oral packet 17 g  17 g Oral Daily PRN    magnesium hydroxide (Milk of Magnesia) 400 MG/5ML oral suspension 30 mL  30 mL Oral Daily PRN    bisacodyl (Dulcolax) 10 MG rectal suppository 10 mg  10 mg Rectal Daily PRN    fleet enema (Fleet) rectal enema 133 mL  1 enema Rectal Once PRN    meclizine (Antivert) tab 12.5 mg  12.5 mg Oral TID PRN    acetaminophen (Tylenol) tab 650 mg  650 mg Oral Q4H PRN    Or    acetaminophen (Tylenol) rectal suppository 650 mg  650 mg Rectal Q4H PRN    hydrALAzine (Apresoline) 20 mg/mL injection 10 mg  10 mg Intravenous Q2H PRN    ondansetron (Zofran) 4 MG/2ML injection 4 mg  4 mg Intravenous Q6H PRN    metoclopramide (Reglan) 5 mg/mL injection 5 mg  5 mg Intravenous Q8H PRN    acetaminophen (Tylenol) tab 650 mg  650 mg Oral Q6H PRN    ondansetron  (Zofran) 4 MG/2ML injection 4 mg  4 mg Intravenous Q6H PRN    albuterol (Ventolin) (2.5 MG/3ML) 0.083% nebulizer solution 2.5 mg  2.5 mg Nebulization Q4H PRN    carvedilol (Coreg) tab 12.5 mg  12.5 mg Oral BID with meals    cyclobenzaprine (Flexeril) tab 10 mg  10 mg Oral Nightly PRN    fluticasone-salmeterol (Advair Diskus) 250-50 MCG/ACT inhaler 1 puff  1 puff Inhalation 2 times per day    gabapentin (Neurontin) cap 100 mg  100 mg Oral Nightly    montelukast (Singulair) tab 10 mg  10 mg Oral Nightly    predniSONE (Deltasone) tab 5 mg  5 mg Oral Daily    losartan (Cozaar) tab 50 mg  50 mg Oral Daily    hydroxychloroquine (Plaquenil) tab 300 mg  300 mg Oral Daily    oxyCODONE immediate release tab 5 mg  5 mg Oral Q6H PRN    insulin aspart (NovoLOG) 100 Units/mL FlexPen 1-11 Units  1-11 Units Subcutaneous TID CC    insulin degludec (Tresiba) 100 units/mL flextouch 10 Units  10 Units Subcutaneous Nightly       Objective:  Blood pressure 128/58, pulse 101, temperature 98.2 °F (36.8 °C), temperature source Oral, resp. rate 19, height 61\", weight 135 lb 11.2 oz (61.6 kg), SpO2 99%.    Physical Exam:  Vitals:    01/22/25 0008 01/22/25 0430 01/22/25 0940 01/22/25 1032   BP:  (!) 168/77 132/73 128/58   Pulse:  97 101    Resp:  20 19    Temp:  98.4 °F (36.9 °C) 98.2 °F (36.8 °C)    TempSrc:  Oral Oral    SpO2:  95% 99%    Weight: 135 lb 11.2 oz (61.6 kg)      Height:         Alert attentive and fully oriented, visual field is full to confrontation test and extraocular movements intact, speech and language are normal, face is symmetric and tongue protrudes in the midline    No weakness in her upper extremities, she is weak in proximal muscle of her right lower extremity, distal muscles 5/5 at right lower extremity  Lab Results   Component Value Date    WBC 11.1 (H) 01/22/2025    HGB 7.8 (L) 01/22/2025    HCT 26.4 (L) 01/22/2025    .0 01/22/2025    CREATSERUM 1.25 (H) 01/22/2025    BUN 23 01/22/2025     01/22/2025     K 3.7 01/22/2025     01/22/2025    CO2 26.0 01/22/2025     (H) 01/22/2025    CA 10.3 01/22/2025    ALB 3.5 01/22/2025    ALKPHO 80 01/21/2025    BILT 0.3 01/21/2025    TP 6.8 01/21/2025    AST 16 01/21/2025    ALT <7 (L) 01/21/2025    PTT 27.0 01/18/2025    INR 0.94 01/17/2025    T4F 1.2 01/17/2025    TSH 0.418 (L) 01/17/2025    DDIMER 1.60 (H) 02/23/2024    ESRML >130 (H) 11/29/2024    CRP 3.00 (H) 11/29/2024    MG 1.6 01/22/2025    PHOS 4.0 01/22/2025    B12 814 11/01/2024       XR CHEST AP/PA (1 VIEW) (CPT=71045)    Result Date: 1/21/2025  CONCLUSION:  1. No significant change. 2. Pulmonary fibrosis. 3. Cardiomegaly.    Dictated by (CST): Michael Gonzalez MD on 1/21/2025 at 5:23 PM     Finalized by (CST): Michael Gonzalez MD on 1/21/2025 at 5:25 PM                 Assessment:  Patient Active Problem List   Diagnosis    Adult general medical examination    Vaccine counseling    Colon cancer screening    Type 2 diabetes mellitus without retinopathy (HCC)    Sarcoidosis    Anemia, iron deficiency    Dyslipidemia    Hypercalcemia    Hypertension    Infiltrating ductal carcinoma of right breast, stage 1 (HCC)    Rheumatoid arthritis (HCC)    Osteoarthrosis    Peripheral vascular disease due to secondary diabetes mellitus (HCC)    Neuropathy    AVM (arteriovenous malformation) of duodenum, acquired    Pseudophakia of both eyes    Vitreous floaters of both eyes    Glaucoma suspect of both eyes    Iron deficiency anemia due to chronic blood loss    Malabsorption of iron (HCC)    Port-A-Cath in place    Chronic obstructive pulmonary disease, unspecified (HCC)    Adnexal mass    Encounter for care related to vascular access port    High risk medication use    Meibomian gland dysfunction (MGD) of both eyes    Hypokalemia    Hyperkalemia    Hypomagnesemia    Constipation    Gastric AVM    AVM (arteriovenous malformation) (HCC)    Leukocytosis    Presence of IVC filter    CKD (chronic kidney disease) stage 3, GFR  30-59 ml/min (HCC)    Dry eye syndrome of both eyes    Vitamin D deficiency    Cystic thyroid nodule    Cough    Chronic fatigue    Cerebellar stroke (HCC)    Dizziness    Right hand paresthesia    Thrombus of aorta (HCC)    Bilateral pulmonary embolism (HCC)    Acute cystitis without hematuria    PE (pulmonary thromboembolism) (HCC)    Aortic thrombus (HCC)    New cerebellar infarct (HCC)     Cerebellar stroke (HCC)  Left thalamic stroke  MRI of the brain, repeated, revealed a small stroke in the left thalamus, cranial portion  CT angiogram of the head and neck revealed 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  She has a history of AVMs in her duodenum that have been cauterized, maintained on octreotide injections requiring iron transfusions and rarely blood transfusions  Echocardiogram with ejection fraction of 55%, mild hypokinesis of the mid anterolateral wall.  No significant change from previous echocardiogram          Recommendations:  1-Vascular surgery consulted planning intervention on 1/24-GI consulted  2- On Brillinta 90mg bid  3-continue high-dose rosuvastatin at 40 mg daily  5-PT/OT/ST  6-A1c 10.1 , lipid panel with LDL of 85  7- High risk morbidity/mortality given her GI bleeding history and need for antiplatelet now, potential anticoagulation in the future, this was communicated with family     Complex patient with risk of deterioration        Neurology following     Thank you for allowing me to participate in the care of your patient.      Afia Zavala MD

## 2025-01-23 DIAGNOSIS — E55.9 VITAMIN D DEFICIENCY: ICD-10-CM

## 2025-01-23 LAB
ALBUMIN SERPL-MCNC: 3.6 G/DL (ref 3.2–4.8)
ALBUMIN/GLOB SERPL: 1.2 {RATIO} (ref 1–2)
ALP LIVER SERPL-CCNC: 82 U/L
ALT SERPL-CCNC: <7 U/L
ANION GAP SERPL CALC-SCNC: 10 MMOL/L (ref 0–18)
ANTIBODY SCREEN: NEGATIVE
AST SERPL-CCNC: 18 U/L (ref ?–34)
BASOPHILS # BLD AUTO: 0.08 X10(3) UL (ref 0–0.2)
BASOPHILS NFR BLD AUTO: 0.7 %
BILIRUB SERPL-MCNC: 0.2 MG/DL (ref 0.2–1.1)
BUN BLD-MCNC: 19 MG/DL (ref 9–23)
BUN/CREAT SERPL: 15.3 (ref 10–20)
CALCIUM BLD-MCNC: 10.3 MG/DL (ref 8.7–10.4)
CHLORIDE SERPL-SCNC: 106 MMOL/L (ref 98–112)
CO2 SERPL-SCNC: 23 MMOL/L (ref 21–32)
CREAT BLD-MCNC: 1.24 MG/DL
DEPRECATED RDW RBC AUTO: 53.4 FL (ref 35.1–46.3)
EGFRCR SERPLBLD CKD-EPI 2021: 43 ML/MIN/1.73M2 (ref 60–?)
EOSINOPHIL # BLD AUTO: 0.44 X10(3) UL (ref 0–0.7)
EOSINOPHIL NFR BLD AUTO: 3.6 %
ERYTHROCYTE [DISTWIDTH] IN BLOOD BY AUTOMATED COUNT: 17.8 % (ref 11–15)
GLOBULIN PLAS-MCNC: 3 G/DL (ref 2–3.5)
GLUCOSE BLD-MCNC: 193 MG/DL (ref 70–99)
GLUCOSE BLDC GLUCOMTR-MCNC: 177 MG/DL (ref 70–99)
GLUCOSE BLDC GLUCOMTR-MCNC: 200 MG/DL (ref 70–99)
GLUCOSE BLDC GLUCOMTR-MCNC: 245 MG/DL (ref 70–99)
GLUCOSE BLDC GLUCOMTR-MCNC: 276 MG/DL (ref 70–99)
HCT VFR BLD AUTO: 26.2 %
HGB BLD-MCNC: 7.7 G/DL
IMM GRANULOCYTES # BLD AUTO: 0.09 X10(3) UL (ref 0–1)
IMM GRANULOCYTES NFR BLD: 0.7 %
LYMPHOCYTES # BLD AUTO: 2.59 X10(3) UL (ref 1–4)
LYMPHOCYTES NFR BLD AUTO: 21.4 %
MAGNESIUM SERPL-MCNC: 1.5 MG/DL (ref 1.6–2.6)
MCH RBC QN AUTO: 24.3 PG (ref 26–34)
MCHC RBC AUTO-ENTMCNC: 29.4 G/DL (ref 31–37)
MCV RBC AUTO: 82.6 FL
MONOCYTES # BLD AUTO: 1.14 X10(3) UL (ref 0.1–1)
MONOCYTES NFR BLD AUTO: 9.4 %
NEUTROPHILS # BLD AUTO: 7.74 X10 (3) UL (ref 1.5–7.7)
NEUTROPHILS # BLD AUTO: 7.74 X10(3) UL (ref 1.5–7.7)
NEUTROPHILS NFR BLD AUTO: 64.2 %
OSMOLALITY SERPL CALC.SUM OF ELEC: 296 MOSM/KG (ref 275–295)
PLATELET # BLD AUTO: 344 10(3)UL (ref 150–450)
POTASSIUM SERPL-SCNC: 4 MMOL/L (ref 3.5–5.1)
POTASSIUM SERPL-SCNC: 4 MMOL/L (ref 3.5–5.1)
PROT SERPL-MCNC: 6.6 G/DL (ref 5.7–8.2)
RBC # BLD AUTO: 3.17 X10(6)UL
RH BLOOD TYPE: POSITIVE
SODIUM SERPL-SCNC: 139 MMOL/L (ref 136–145)
WBC # BLD AUTO: 12.1 X10(3) UL (ref 4–11)

## 2025-01-23 PROCEDURE — 99233 SBSQ HOSP IP/OBS HIGH 50: CPT | Performed by: INTERNAL MEDICINE

## 2025-01-23 RX ORDER — MAGNESIUM OXIDE 400 MG/1
800 TABLET ORAL ONCE
Status: COMPLETED | OUTPATIENT
Start: 2025-01-23 | End: 2025-01-23

## 2025-01-23 RX ORDER — SODIUM CHLORIDE 9 MG/ML
INJECTION, SOLUTION INTRAVENOUS CONTINUOUS
Status: DISCONTINUED | OUTPATIENT
Start: 2025-01-23 | End: 2025-01-31

## 2025-01-23 NOTE — PLAN OF CARE
Problem: HEMATOLOGIC - ADULT  Goal: Free from bleeding injury  Description: (Example usage: patient with low platelets)  INTERVENTIONS:  - Avoid intramuscular injections, enemas and rectal medication administration  - Ensure safe mobilization of patient  - Hold pressure on venipuncture sites to achieve adequate hemostasis  - Assess for signs and symptoms of internal bleeding  - Monitor lab trends  - Patient is to report abnormal signs of bleeding to staff  - Avoid use of toothpicks and dental floss  - Use electric shaver for shaving  - Use soft bristle tooth brush  - Limit straining and forceful nose blowing  Outcome: Progressing     Problem: Patient Centered Care  Goal: Patient preferences are identified and integrated in the patient's plan of care  Description: Interventions:  - What would you like us to know as we care for you? From home with daughter   - Provide timely, complete, and accurate information to patient/family  - Incorporate patient and family knowledge, values, beliefs, and cultural backgrounds into the planning and delivery of care  - Encourage patient/family to participate in care and decision-making at the level they choose  - Honor patient and family perspectives and choices  Outcome: Progressing     Problem: Diabetes/Glucose Control  Goal: Glucose maintained within prescribed range  Description: INTERVENTIONS:  - Monitor Blood Glucose as ordered  - Assess for signs and symptoms of hyperglycemia and hypoglycemia  - Administer ordered medications to maintain glucose within target range  - Assess barriers to adequate nutritional intake and initiate nutrition consult as needed  - Instruct patient on self management of diabetes  Outcome: Progressing     Problem: Patient/Family Goals  Goal: Patient/Family Long Term Goal  Description: Patient's Long Term Goal: discharge from hospital     Interventions:  - monitor vital signs   - monitor blood glucose levels  - monitor appropriate labs  - pain  management   - administer medications per order  - follow MD orders  - diagnostics per order  - update and inform patient and family on plan of care  - discharge planning  - See additional Care Plan goals for specific interventions  Outcome: Progressing  Goal: Patient/Family Short Term Goal  Description: Patient's Short Term Goal: manage pain     Interventions:   - monitor vital signs   - monitor blood glucose levels  - monitor appropriate labs  - pain management   - administer medications per order  - follow MD orders  - diagnostics per order  - update and inform patient and family on plan of care  - See additional Care Plan goals for specific interventions  Outcome: Progressing     Problem: CARDIOVASCULAR - ADULT  Goal: Maintains optimal cardiac output and hemodynamic stability  Description: INTERVENTIONS:  - Monitor vital signs, rhythm, and trends  - Monitor for bleeding, hypotension and signs of decreased cardiac output  - Evaluate effectiveness of vasoactive medications to optimize hemodynamic stability  - Monitor arterial and/or venous puncture sites for bleeding and/or hematoma  - Assess quality of pulses, skin color and temperature  - Assess for signs of decreased coronary artery perfusion - ex. Angina  - Evaluate fluid balance, assess for edema, trend weights  Outcome: Progressing  Goal: Absence of cardiac arrhythmias or at baseline  Description: INTERVENTIONS:  - Continuous cardiac monitoring, monitor vital signs, obtain 12 lead EKG if indicated  - Evaluate effectiveness of antiarrhythmic and heart rate control medications as ordered  - Initiate emergency measures for life threatening arrhythmias  - Monitor electrolytes and administer replacement therapy as ordered  Outcome: Progressing     Problem: METABOLIC/FLUID AND ELECTROLYTES - ADULT  Goal: Glucose maintained within prescribed range  Description: INTERVENTIONS:  - Monitor Blood Glucose as ordered  - Assess for signs and symptoms of hyperglycemia and  hypoglycemia  - Administer ordered medications to maintain glucose within target range  - Assess barriers to adequate nutritional intake and initiate nutrition consult as needed  - Instruct patient on self management of diabetes  Outcome: Progressing  Goal: Electrolytes maintained within normal limits  Description: INTERVENTIONS:  - Monitor labs and rhythm and assess patient for signs and symptoms of electrolyte imbalances  - Administer electrolyte replacement as ordered  - Monitor response to electrolyte replacements, including rhythm and repeat lab results as appropriate  - Fluid restriction as ordered  - Instruct patient on fluid and nutrition restrictions as appropriate  Outcome: Progressing  Goal: Hemodynamic stability and optimal renal function maintained  Description: INTERVENTIONS:  - Monitor labs and assess for signs and symptoms of volume excess or deficit  - Monitor intake, output and patient weight  - Monitor urine specific gravity, serum osmolarity and serum sodium as indicated or ordered  - Monitor response to interventions for patient's volume status, including labs, urine output, blood pressure (other measures as available)  - Encourage oral intake as appropriate  - Instruct patient on fluid and nutrition restrictions as appropriate  Outcome: Progressing     Problem: NEUROLOGICAL - ADULT  Goal: Achieves stable or improved neurological status  Description: INTERVENTIONS  - Assess for and report changes in neurological status  - Initiate measures to prevent increased intracranial pressure  - Maintain blood pressure and fluid volume within ordered parameters to optimize cerebral perfusion and minimize risk of hemorrhage  - Monitor temperature, glucose, and sodium. Initiate appropriate interventions as ordered  Outcome: Progressing  Goal: Achieves maximal functionality and self care  Description: INTERVENTIONS  - Monitor swallowing and airway patency with patient fatigue and changes in neurological  status  - Encourage and assist patient to increase activity and self care with guidance from PT/OT  - Encourage visually impaired, hearing impaired and aphasic patients to use assistive/communication devices  Outcome: Progressing     Problem: PAIN - ADULT  Goal: Verbalizes/displays adequate comfort level or patient's stated pain goal  Description: INTERVENTIONS:  - Encourage pt to monitor pain and request assistance  - Assess pain using appropriate pain scale  - Administer analgesics based on type and severity of pain and evaluate response  - Implement non-pharmacological measures as appropriate and evaluate response  - Consider cultural and social influences on pain and pain management  - Manage/alleviate anxiety  - Utilize distraction and/or relaxation techniques  - Monitor for opioid side effects  - Notify MD/LIP if interventions unsuccessful or patient reports new pain  - Anticipate increased pain with activity and pre-medicate as appropriate  Outcome: Progressing     Problem: SAFETY ADULT - FALL  Goal: Free from fall injury  Description: INTERVENTIONS:  - Assess pt frequently for physical needs  - Identify cognitive and physical deficits and behaviors that affect risk of falls.  - [place] fall precautions as indicated by assessment.  - Educate pt/family on patient safety including physical limitations  - Instruct pt to call for assistance with activity based on assessment  - Modify environment to reduce risk of injury  - Provide assistive devices as appropriate  - Consider OT/PT consult to assist with strengthening/mobility  - Encourage toileting schedule  Outcome: Progressing     Problem: DISCHARGE PLANNING  Goal: Discharge to home or other facility with appropriate resources  Description: INTERVENTIONS:  - Identify barriers to discharge w/pt and caregiver  - Include patient/family/discharge partner in discharge planning  - Arrange for needed discharge resources and transportation as appropriate  - Identify  discharge learning needs (meds, wound care, etc)  - Arrange for interpreters to assist at discharge as needed  - Consider post-discharge preferences of patient/family/discharge partner  - Complete POLST form as appropriate  - Assess patient's ability to be responsible for managing their own health  - Refer to Case Management Department for coordinating discharge planning if the patient needs post-hospital services based on physician/LIP order or complex needs related to functional status, cognitive ability or social support system  Outcome: Progressing     Problem: SKIN/TISSUE INTEGRITY - ADULT  Goal: Skin integrity remains intact  Description: INTERVENTIONS  - Assess and document risk factors for pressure ulcer development  - Assess and document skin integrity  - Monitor for areas of redness and/or skin breakdown  - Initiate interventions, skin care algorithm/standards of care as needed  Outcome: Progressing     Problem: MUSCULOSKELETAL - ADULT  Goal: Return mobility to safest level of function  Description: INTERVENTIONS:  - Assess patient stability and activity tolerance for standing, transferring and ambulating w/ or w/o assistive devices  - Assist with transfers and ambulation using safe patient handling equipment as needed  - Ensure adequate protection for wounds/incisions during mobilization  - Obtain PT/OT consults as needed  - Advance activity as appropriate  - Communicate ordered activity level and limitations with patient/family  Outcome: Progressing     Problem: Impaired Swallowing  Goal: Minimize aspiration risk  Description: Interventions:  - Patient should be alert and upright for all feedings (90 degrees preferred)  - Offer food and liquids at a slow rate  - No straws  - Encourage small bites of food and small sips of liquid  - Offer pills one at a time, crush or deliver with applesauce as needed  - Discontinue feeding and notify MD (or speech pathologist) if coughing or persistent throat clearing or  wet/gurgly vocal quality is noted  Outcome: Progressing

## 2025-01-23 NOTE — PLAN OF CARE
Problem: HEMATOLOGIC - ADULT  Goal: Free from bleeding injury  Description: (Example usage: patient with low platelets)  INTERVENTIONS:  - Avoid intramuscular injections, enemas and rectal medication administration  - Ensure safe mobilization of patient  - Hold pressure on venipuncture sites to achieve adequate hemostasis  - Assess for signs and symptoms of internal bleeding  - Monitor lab trends  - Patient is to report abnormal signs of bleeding to staff  - Avoid use of toothpicks and dental floss  - Use electric shaver for shaving  - Use soft bristle tooth brush  - Limit straining and forceful nose blowing  Outcome: Progressing     Problem: Patient Centered Care  Goal: Patient preferences are identified and integrated in the patient's plan of care  Description: Interventions:  - What would you like us to know as we care for you? From home with daughter   - Provide timely, complete, and accurate information to patient/family  - Incorporate patient and family knowledge, values, beliefs, and cultural backgrounds into the planning and delivery of care  - Encourage patient/family to participate in care and decision-making at the level they choose  - Honor patient and family perspectives and choices  Outcome: Progressing     Problem: Diabetes/Glucose Control  Goal: Glucose maintained within prescribed range  Description: INTERVENTIONS:  - Monitor Blood Glucose as ordered  - Assess for signs and symptoms of hyperglycemia and hypoglycemia  - Administer ordered medications to maintain glucose within target range  - Assess barriers to adequate nutritional intake and initiate nutrition consult as needed  - Instruct patient on self management of diabetes  Outcome: Progressing     Problem: Patient/Family Goals  Goal: Patient/Family Long Term Goal  Description: Patient's Long Term Goal: discharge from hospital     Interventions:  - monitor vital signs   - monitor blood glucose levels  - monitor appropriate labs  - pain  management   - administer medications per order  - follow MD orders  - diagnostics per order  - update and inform patient and family on plan of care  - discharge planning  - See additional Care Plan goals for specific interventions  Outcome: Progressing  Goal: Patient/Family Short Term Goal  Description: Patient's Short Term Goal: manage pain     Interventions:   - monitor vital signs   - monitor blood glucose levels  - monitor appropriate labs  - pain management   - administer medications per order  - follow MD orders  - diagnostics per order  - update and inform patient and family on plan of care  - See additional Care Plan goals for specific interventions  Outcome: Progressing     Problem: CARDIOVASCULAR - ADULT  Goal: Maintains optimal cardiac output and hemodynamic stability  Description: INTERVENTIONS:  - Monitor vital signs, rhythm, and trends  - Monitor for bleeding, hypotension and signs of decreased cardiac output  - Evaluate effectiveness of vasoactive medications to optimize hemodynamic stability  - Monitor arterial and/or venous puncture sites for bleeding and/or hematoma  - Assess quality of pulses, skin color and temperature  - Assess for signs of decreased coronary artery perfusion - ex. Angina  - Evaluate fluid balance, assess for edema, trend weights  Outcome: Progressing  Goal: Absence of cardiac arrhythmias or at baseline  Description: INTERVENTIONS:  - Continuous cardiac monitoring, monitor vital signs, obtain 12 lead EKG if indicated  - Evaluate effectiveness of antiarrhythmic and heart rate control medications as ordered  - Initiate emergency measures for life threatening arrhythmias  - Monitor electrolytes and administer replacement therapy as ordered  Outcome: Progressing     Problem: METABOLIC/FLUID AND ELECTROLYTES - ADULT  Goal: Glucose maintained within prescribed range  Description: INTERVENTIONS:  - Monitor Blood Glucose as ordered  - Assess for signs and symptoms of hyperglycemia and  hypoglycemia  - Administer ordered medications to maintain glucose within target range  - Assess barriers to adequate nutritional intake and initiate nutrition consult as needed  - Instruct patient on self management of diabetes  Outcome: Progressing  Goal: Electrolytes maintained within normal limits  Description: INTERVENTIONS:  - Monitor labs and rhythm and assess patient for signs and symptoms of electrolyte imbalances  - Administer electrolyte replacement as ordered  - Monitor response to electrolyte replacements, including rhythm and repeat lab results as appropriate  - Fluid restriction as ordered  - Instruct patient on fluid and nutrition restrictions as appropriate  Outcome: Progressing  Goal: Hemodynamic stability and optimal renal function maintained  Description: INTERVENTIONS:  - Monitor labs and assess for signs and symptoms of volume excess or deficit  - Monitor intake, output and patient weight  - Monitor urine specific gravity, serum osmolarity and serum sodium as indicated or ordered  - Monitor response to interventions for patient's volume status, including labs, urine output, blood pressure (other measures as available)  - Encourage oral intake as appropriate  - Instruct patient on fluid and nutrition restrictions as appropriate  Outcome: Progressing     Problem: NEUROLOGICAL - ADULT  Goal: Achieves stable or improved neurological status  Description: INTERVENTIONS  - Assess for and report changes in neurological status  - Initiate measures to prevent increased intracranial pressure  - Maintain blood pressure and fluid volume within ordered parameters to optimize cerebral perfusion and minimize risk of hemorrhage  - Monitor temperature, glucose, and sodium. Initiate appropriate interventions as ordered  Outcome: Progressing  Goal: Achieves maximal functionality and self care  Description: INTERVENTIONS  - Monitor swallowing and airway patency with patient fatigue and changes in neurological  status  - Encourage and assist patient to increase activity and self care with guidance from PT/OT  - Encourage visually impaired, hearing impaired and aphasic patients to use assistive/communication devices  Outcome: Progressing     Problem: SKIN/TISSUE INTEGRITY - ADULT  Goal: Skin integrity remains intact  Description: INTERVENTIONS  - Assess and document risk factors for pressure ulcer development  - Assess and document skin integrity  - Monitor for areas of redness and/or skin breakdown  - Initiate interventions, skin care algorithm/standards of care as needed  Outcome: Progressing     Problem: PAIN - ADULT  Goal: Verbalizes/displays adequate comfort level or patient's stated pain goal  Description: INTERVENTIONS:  - Encourage pt to monitor pain and request assistance  - Assess pain using appropriate pain scale  - Administer analgesics based on type and severity of pain and evaluate response  - Implement non-pharmacological measures as appropriate and evaluate response  - Consider cultural and social influences on pain and pain management  - Manage/alleviate anxiety  - Utilize distraction and/or relaxation techniques  - Monitor for opioid side effects  - Notify MD/LIP if interventions unsuccessful or patient reports new pain  - Anticipate increased pain with activity and pre-medicate as appropriate  Outcome: Progressing     Problem: Impaired Swallowing  Goal: Minimize aspiration risk  Description: Interventions:  - Patient should be alert and upright for all feedings (90 degrees preferred)  - Offer food and liquids at a slow rate  - No straws  - Encourage small bites of food and small sips of liquid  - Offer pills one at a time, crush or deliver with applesauce as needed  - Discontinue feeding and notify MD (or speech pathologist) if coughing or persistent throat clearing or wet/gurgly vocal quality is noted  Outcome: Progressing     Problem: MUSCULOSKELETAL - ADULT  Goal: Return mobility to safest level of  function  Description: INTERVENTIONS:  - Assess patient stability and activity tolerance for standing, transferring and ambulating w/ or w/o assistive devices  - Assist with transfers and ambulation using safe patient handling equipment as needed  - Ensure adequate protection for wounds/incisions during mobilization  - Obtain PT/OT consults as needed  - Advance activity as appropriate  - Communicate ordered activity level and limitations with patient/family  Outcome: Progressing     Problem: DISCHARGE PLANNING  Goal: Discharge to home or other facility with appropriate resources  Description: INTERVENTIONS:  - Identify barriers to discharge w/pt and caregiver  - Include patient/family/discharge partner in discharge planning  - Arrange for needed discharge resources and transportation as appropriate  - Identify discharge learning needs (meds, wound care, etc)  - Arrange for interpreters to assist at discharge as needed  - Consider post-discharge preferences of patient/family/discharge partner  - Complete POLST form as appropriate  - Assess patient's ability to be responsible for managing their own health  - Refer to Case Management Department for coordinating discharge planning if the patient needs post-hospital services based on physician/LIP order or complex needs related to functional status, cognitive ability or social support system  Outcome: Progressing     Patient A&Ox3, VSS, no distress. Assist x1 with walker to bathroom. Up in chair. POC communicated to patient and understanding verbalized. Call light in reach and safety needs met.

## 2025-01-23 NOTE — PLAN OF CARE
Prudencio Bautista is A&O x 4.  Lives at home with daughter.    One assist with walker.  Continent of bowel and bladder.  VSS:  stable.  Pain: denies.  Plan:  angiogram on Friday, monitor Hgb, IV ABX, neuro checks, NIH  Bed in lowest position, alarms on, and call light within reach.  Will continue to monitor and treat.    Problem: HEMATOLOGIC - ADULT  Goal: Free from bleeding injury  Description: (Example usage: patient with low platelets)  INTERVENTIONS:  - Avoid intramuscular injections, enemas and rectal medication administration  - Ensure safe mobilization of patient  - Hold pressure on venipuncture sites to achieve adequate hemostasis  - Assess for signs and symptoms of internal bleeding  - Monitor lab trends  - Patient is to report abnormal signs of bleeding to staff  - Avoid use of toothpicks and dental floss  - Use electric shaver for shaving  - Use soft bristle tooth brush  - Limit straining and forceful nose blowing  Outcome: Progressing     Problem: Patient Centered Care  Goal: Patient preferences are identified and integrated in the patient's plan of care  Description: Interventions:  - What would you like us to know as we care for you? From home with daughter   - Provide timely, complete, and accurate information to patient/family  - Incorporate patient and family knowledge, values, beliefs, and cultural backgrounds into the planning and delivery of care  - Encourage patient/family to participate in care and decision-making at the level they choose  - Honor patient and family perspectives and choices  Outcome: Progressing     Problem: Diabetes/Glucose Control  Goal: Glucose maintained within prescribed range  Description: INTERVENTIONS:  - Monitor Blood Glucose as ordered  - Assess for signs and symptoms of hyperglycemia and hypoglycemia  - Administer ordered medications to maintain glucose within target range  - Assess barriers to adequate nutritional intake and initiate nutrition consult as needed  -  Instruct patient on self management of diabetes  Outcome: Progressing     Problem: Patient/Family Goals  Goal: Patient/Family Long Term Goal  Description: Patient's Long Term Goal: discharge from hospital     Interventions:  - monitor vital signs   - monitor blood glucose levels  - monitor appropriate labs  - pain management   - administer medications per order  - follow MD orders  - diagnostics per order  - update and inform patient and family on plan of care  - discharge planning  - See additional Care Plan goals for specific interventions  Outcome: Progressing  Goal: Patient/Family Short Term Goal  Description: Patient's Short Term Goal: manage pain     Interventions:   - monitor vital signs   - monitor blood glucose levels  - monitor appropriate labs  - pain management   - administer medications per order  - follow MD orders  - diagnostics per order  - update and inform patient and family on plan of care  - See additional Care Plan goals for specific interventions  Outcome: Progressing     Problem: CARDIOVASCULAR - ADULT  Goal: Maintains optimal cardiac output and hemodynamic stability  Description: INTERVENTIONS:  - Monitor vital signs, rhythm, and trends  - Monitor for bleeding, hypotension and signs of decreased cardiac output  - Evaluate effectiveness of vasoactive medications to optimize hemodynamic stability  - Monitor arterial and/or venous puncture sites for bleeding and/or hematoma  - Assess quality of pulses, skin color and temperature  - Assess for signs of decreased coronary artery perfusion - ex. Angina  - Evaluate fluid balance, assess for edema, trend weights  Outcome: Progressing  Goal: Absence of cardiac arrhythmias or at baseline  Description: INTERVENTIONS:  - Continuous cardiac monitoring, monitor vital signs, obtain 12 lead EKG if indicated  - Evaluate effectiveness of antiarrhythmic and heart rate control medications as ordered  - Initiate emergency measures for life threatening  arrhythmias  - Monitor electrolytes and administer replacement therapy as ordered  Outcome: Progressing     Problem: METABOLIC/FLUID AND ELECTROLYTES - ADULT  Goal: Glucose maintained within prescribed range  Description: INTERVENTIONS:  - Monitor Blood Glucose as ordered  - Assess for signs and symptoms of hyperglycemia and hypoglycemia  - Administer ordered medications to maintain glucose within target range  - Assess barriers to adequate nutritional intake and initiate nutrition consult as needed  - Instruct patient on self management of diabetes  Outcome: Progressing  Goal: Electrolytes maintained within normal limits  Description: INTERVENTIONS:  - Monitor labs and rhythm and assess patient for signs and symptoms of electrolyte imbalances  - Administer electrolyte replacement as ordered  - Monitor response to electrolyte replacements, including rhythm and repeat lab results as appropriate  - Fluid restriction as ordered  - Instruct patient on fluid and nutrition restrictions as appropriate  Outcome: Progressing  Goal: Hemodynamic stability and optimal renal function maintained  Description: INTERVENTIONS:  - Monitor labs and assess for signs and symptoms of volume excess or deficit  - Monitor intake, output and patient weight  - Monitor urine specific gravity, serum osmolarity and serum sodium as indicated or ordered  - Monitor response to interventions for patient's volume status, including labs, urine output, blood pressure (other measures as available)  - Encourage oral intake as appropriate  - Instruct patient on fluid and nutrition restrictions as appropriate  Outcome: Progressing     Problem: NEUROLOGICAL - ADULT  Goal: Achieves stable or improved neurological status  Description: INTERVENTIONS  - Assess for and report changes in neurological status  - Initiate measures to prevent increased intracranial pressure  - Maintain blood pressure and fluid volume within ordered parameters to optimize cerebral  perfusion and minimize risk of hemorrhage  - Monitor temperature, glucose, and sodium. Initiate appropriate interventions as ordered  Outcome: Progressing  Goal: Achieves maximal functionality and self care  Description: INTERVENTIONS  - Monitor swallowing and airway patency with patient fatigue and changes in neurological status  - Encourage and assist patient to increase activity and self care with guidance from PT/OT  - Encourage visually impaired, hearing impaired and aphasic patients to use assistive/communication devices  Outcome: Progressing     Problem: PAIN - ADULT  Goal: Verbalizes/displays adequate comfort level or patient's stated pain goal  Description: INTERVENTIONS:  - Encourage pt to monitor pain and request assistance  - Assess pain using appropriate pain scale  - Administer analgesics based on type and severity of pain and evaluate response  - Implement non-pharmacological measures as appropriate and evaluate response  - Consider cultural and social influences on pain and pain management  - Manage/alleviate anxiety  - Utilize distraction and/or relaxation techniques  - Monitor for opioid side effects  - Notify MD/LIP if interventions unsuccessful or patient reports new pain  - Anticipate increased pain with activity and pre-medicate as appropriate  Outcome: Progressing     Problem: SAFETY ADULT - FALL  Goal: Free from fall injury  Description: INTERVENTIONS:  - Assess pt frequently for physical needs  - Identify cognitive and physical deficits and behaviors that affect risk of falls.  - [place] fall precautions as indicated by assessment.  - Educate pt/family on patient safety including physical limitations  - Instruct pt to call for assistance with activity based on assessment  - Modify environment to reduce risk of injury  - Provide assistive devices as appropriate  - Consider OT/PT consult to assist with strengthening/mobility  - Encourage toileting schedule  Outcome: Progressing     Problem:  DISCHARGE PLANNING  Goal: Discharge to home or other facility with appropriate resources  Description: INTERVENTIONS:  - Identify barriers to discharge w/pt and caregiver  - Include patient/family/discharge partner in discharge planning  - Arrange for needed discharge resources and transportation as appropriate  - Identify discharge learning needs (meds, wound care, etc)  - Arrange for interpreters to assist at discharge as needed  - Consider post-discharge preferences of patient/family/discharge partner  - Complete POLST form as appropriate  - Assess patient's ability to be responsible for managing their own health  - Refer to Case Management Department for coordinating discharge planning if the patient needs post-hospital services based on physician/LIP order or complex needs related to functional status, cognitive ability or social support system  Outcome: Progressing     Problem: SKIN/TISSUE INTEGRITY - ADULT  Goal: Skin integrity remains intact  Description: INTERVENTIONS  - Assess and document risk factors for pressure ulcer development  - Assess and document skin integrity  - Monitor for areas of redness and/or skin breakdown  - Initiate interventions, skin care algorithm/standards of care as needed  Outcome: Progressing     Problem: MUSCULOSKELETAL - ADULT  Goal: Return mobility to safest level of function  Description: INTERVENTIONS:  - Assess patient stability and activity tolerance for standing, transferring and ambulating w/ or w/o assistive devices  - Assist with transfers and ambulation using safe patient handling equipment as needed  - Ensure adequate protection for wounds/incisions during mobilization  - Obtain PT/OT consults as needed  - Advance activity as appropriate  - Communicate ordered activity level and limitations with patient/family  Outcome: Progressing     Problem: Impaired Swallowing  Goal: Minimize aspiration risk  Description: Interventions:  - Patient should be alert and upright for  all feedings (90 degrees preferred)  - Offer food and liquids at a slow rate  - No straws  - Encourage small bites of food and small sips of liquid  - Offer pills one at a time, crush or deliver with applesauce as needed  - Discontinue feeding and notify MD (or speech pathologist) if coughing or persistent throat clearing or wet/gurgly vocal quality is noted  Outcome: Progressing

## 2025-01-23 NOTE — PROGRESS NOTES
[facility]  part of [facility]    Progress Note    Chester Bautista Patient Status:  Inpatient    1940 MRN [id number]   Location [facility] 3W/SW Attending Richard Guerra MD   Hosp Day # 5 PCP Heike Sorto MD       Subjective:   Sitting up comfortably and in NAD.   Denied any active complaints at the time of interview.  Vascular surgery scheduled for Fr[place].  No overnight events reported.   Plan of care discussed with the family over the phone.     Objective:   Blood pressure 155/65, pulse 106, temperature 98.2 °F (36.8 °C), temperature source Oral, resp. rate 16, height 5' 1\" (1.549 m), weight 135 lb 9.6 oz (61.5 kg), SpO2 95%.    General: AAF, NAD  Respiratory: Clear to auscultation bilaterally. No wheezes. No rhonchi.  Cardiovascular: RRR  Abdomen: Soft, nontender, nondistended.  Neurologic: No focal neurological deficits.   Musculoskeletal: Moves all extremities.  Extremities: No edema or cyanosis.    Current Inpatient Medications:     Current Facility-Administered Medications:     rosuvastatin (Crestor) tab 10 mg, 10 mg, Oral, Nightly    sennosides (Senokot) tab 8.6 mg, 8.6 mg, Oral, BID    LORazepam (Ativan) tab 0.5 mg, 0.5 mg, Oral, PRN    ticagrelor (Brilinta) tab 90 mg, 90 mg, Oral, Q12H    ceFEPIme (Maxipime) 1 g in sodium chloride 0.9% 100 mL IVPB-MBP, 1 g, Intravenous, Q24H    cetirizine (ZyrTEC) tab 5 mg, 5 mg, Oral, Daily    famotidine (Pepcid) tab 20 mg, 20 mg, Oral, Daily    acetaminophen (Tylenol Extra Strength) tab 500 mg, 500 mg, Oral, Q6H PRN    docusate sodium (Colace) cap 100 mg, 100 mg, Oral, BID    polyethylene glycol (PEG 3350) (Miralax) 17 g oral packet 17 g, 17 g, Oral, Daily PRN    magnesium hydroxide (Milk of Magnesia) 400 MG/5ML oral suspension 30 mL, 30 mL, Oral, Daily PRN    bisacodyl (Dulcolax) 10 MG rectal suppository 10 mg, 10 mg, Rectal, Daily PRN    fleet enema (Fleet) rectal enema 133 mL, 1 enema, Rectal, Once PRN     meclizine (Antivert) tab 12.5 mg, 12.5 mg, Oral, TID PRN    acetaminophen (Tylenol) tab 650 mg, 650 mg, Oral, Q4H PRN **OR** acetaminophen (Tylenol) rectal suppository 650 mg, 650 mg, Rectal, Q4H PRN    hydrALAzine (Apresoline) 20 mg/mL injection 10 mg, 10 mg, Intravenous, Q2H PRN    ondansetron (Zofran) 4 MG/2ML injection 4 mg, 4 mg, Intravenous, Q6H PRN    metoclopramide (Reglan) 5 mg/mL injection 5 mg, 5 mg, Intravenous, Q8H PRN    acetaminophen (Tylenol) tab 650 mg, 650 mg, Oral, Q6H PRN    ondansetron (Zofran) 4 MG/2ML injection 4 mg, 4 mg, Intravenous, Q6H PRN    albuterol (Ventolin) (2.5 MG/3ML) 0.083% nebulizer solution 2.5 mg, 2.5 mg, Nebulization, Q4H PRN    carvedilol (Coreg) tab 12.5 mg, 12.5 mg, Oral, BID with meals    cyclobenzaprine (Flexeril) tab 10 mg, 10 mg, Oral, Nightly PRN    fluticasone-salmeterol (Advair Diskus) 250-50 MCG/ACT inhaler 1 puff, 1 puff, Inhalation, 2 times per day    gabapentin (Neurontin) cap 100 mg, 100 mg, Oral, Nightly    montelukast (Singulair) tab 10 mg, 10 mg, Oral, Nightly    predniSONE (Deltasone) tab 5 mg, 5 mg, Oral, Daily    losartan (Cozaar) tab 50 mg, 50 mg, Oral, Daily    hydroxychloroquine (Plaquenil) tab 300 mg, 300 mg, Oral, Daily    oxyCODONE immediate release tab 5 mg, 5 mg, Oral, Q6H PRN    insulin aspart (NovoLOG) 100 Units/mL FlexPen 1-11 Units, 1-11 Units, Subcutaneous, TID CC    insulin degludec (Tresiba) 100 units/mL flextouch 10 Units, 10 Units, Subcutaneous, Nightly      Results:     Recent Labs   Lab 01/21/25  0649 01/22/25  0659 01/23/25  0702   RBC 3.39* 3.36* 3.17*   HGB 8.4* 7.8* 7.7*   HCT 27.7* 26.4* 26.2*   MCV 81.7 78.6* 82.6   MCH 24.8* 23.2* 24.3*   MCHC 30.3* 29.5* 29.4*   RDW 17.9* 17.8* 17.8*   NEPRELIM 7.85* 6.98 7.74*   WBC 11.9* 11.1* 12.1*   .0 376.0 344.0         Recent Labs   Lab 01/21/25  0649 01/22/25  0659 01/23/25  0702   * 185* 193*   BUN 20 23 19   CREATSERUM 1.23* 1.25* 1.24*   EGFRCR 43* 42* 43*   CA 10.0 10.3  10.3    139 139   K 4.3 3.7 4.0  4.0    107 106   CO2 26.0 26.0 23.0         Imaging:   XR CHEST AP/PA (1 VIEW) (CPT=71045)    Result Date: 1/21/2025  CONCLUSION:  1. No significant change. 2. Pulmonary fibrosis. 3. Cardiomegaly.    Dictated by (CST): Michael Gonzalez MD on 1/21/2025 at 5:23 PM     Finalized by (CST): Michael Gonzalez MD on 1/21/2025 at 5:25 PM                 Assessment and Plan:   #Left Cerebellar Infarcts  - pt with new sudden onset neuro changes  - 2 small cerebellar infarcts seen on admission MRI  -Patient already took 3 baby aspirin's prior to coming to ED   - CTA negative for LVO, but showing small subsegmental Pes  - Sstroke alert called 1/19 -> stroke CT non-acute, MRI showing small CVA in L thalamus   - f/u lipid panel, A1c, TSH, ECHO   - Neuro on consult  -Continue statin  -Continue Brillinta  -high risk morbidity/mortality given her history of GI bleed and now need for antiplatelet therapy and potential anticoagulation in the future. Family is aware  -PT/OT/ST eval  -Vascular surgery on consult  -scheduled for LUE, aortic arch and cerebral angiogram with possible intervention on Friday    #Dizziness  Improved   -Likely in setting of above  -Meclizine prn   -Neurology consulted     #PE   -Patient is hemodynamically stable, EKG NSR and troponin not elevated.  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 Already took 3 baby aspirins prior to admission  -Previously had IVC filter placed in March 2024 for PE at that time (removed June 2024).   -Needs eval for hypercoagulable state. Risk factors of recent car ride, overall sedentary state, breast cancer. Sees Dr. Cade Chu in clinic for anemia, needs further follow up   -given current presentation the patient was restarted on AC, close Hb monitoring     #Descending aortic arch thrombus   -Admission CTA chest revealing mural thrombus throughout the thoracic and abdominal  aorta, chronic per radiology  -Vascular surgery on consult  -planning for angiogram 01/24/2025  -cont brilinta and statin for now      #HTN  -Hold home antihypertensives to allow for permissive hypertension poststroke     #Klebsiella UTI  -nitrofurantoin initiated in ED,   -Ucx reviewed - nitrofurantoin resistant  -started on IV Cefepime    #Constipation  -bowel regimen ordered     #Anemia  -in the setting of PE and aortic arch thrombus, the decision was made to resume anticoagulation despite the patients history of bleeding AVMs  -Hb appears to be stable at this time 8.4 -> 7.8 -> 7.7  -monitor H&H  -transfuse as indicated     Chronic Medical Problems  HTN  HLD  DM  COPD/?sarcoidosis   Rheumatoid Arthritis  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

## 2025-01-24 ENCOUNTER — APPOINTMENT (OUTPATIENT)
Dept: INTERVENTIONAL RADIOLOGY/VASCULAR | Facility: HOSPITAL | Age: 85
End: 2025-01-24
Attending: STUDENT IN AN ORGANIZED HEALTH CARE EDUCATION/TRAINING PROGRAM
Payer: MEDICARE

## 2025-01-24 ENCOUNTER — APPOINTMENT (OUTPATIENT)
Dept: CT IMAGING | Facility: HOSPITAL | Age: 85
End: 2025-01-24
Attending: STUDENT IN AN ORGANIZED HEALTH CARE EDUCATION/TRAINING PROGRAM
Payer: MEDICARE

## 2025-01-24 ENCOUNTER — TELEPHONE (OUTPATIENT)
Age: 85
End: 2025-01-24

## 2025-01-24 ENCOUNTER — APPOINTMENT (OUTPATIENT)
Dept: PICC SERVICES | Facility: HOSPITAL | Age: 85
End: 2025-01-24
Attending: INTERNAL MEDICINE
Payer: MEDICARE

## 2025-01-24 ENCOUNTER — APPOINTMENT (OUTPATIENT)
Dept: ULTRASOUND IMAGING | Facility: HOSPITAL | Age: 85
End: 2025-01-24
Attending: INTERNAL MEDICINE
Payer: MEDICARE

## 2025-01-24 ENCOUNTER — APPOINTMENT (OUTPATIENT)
Dept: CT IMAGING | Facility: HOSPITAL | Age: 85
End: 2025-01-24
Attending: HOSPITALIST
Payer: MEDICARE

## 2025-01-24 ENCOUNTER — APPOINTMENT (OUTPATIENT)
Dept: CT IMAGING | Facility: HOSPITAL | Age: 85
End: 2025-01-24
Attending: Other
Payer: MEDICARE

## 2025-01-24 LAB
ALBUMIN SERPL-MCNC: 3.2 G/DL (ref 3.2–4.8)
ALBUMIN/GLOB SERPL: 1 {RATIO} (ref 1–2)
ALP LIVER SERPL-CCNC: 74 U/L
ALT SERPL-CCNC: <7 U/L
ANION GAP SERPL CALC-SCNC: 9 MMOL/L (ref 0–18)
AST SERPL-CCNC: 18 U/L (ref ?–34)
BASOPHILS # BLD AUTO: 0.05 X10(3) UL (ref 0–0.2)
BASOPHILS NFR BLD AUTO: 0.4 %
BILIRUB SERPL-MCNC: 0.2 MG/DL (ref 0.2–1.1)
BUN BLD-MCNC: 23 MG/DL (ref 9–23)
BUN/CREAT SERPL: 18.7 (ref 10–20)
CALCIUM BLD-MCNC: 9.7 MG/DL (ref 8.7–10.4)
CHLORIDE SERPL-SCNC: 105 MMOL/L (ref 98–112)
CO2 SERPL-SCNC: 27 MMOL/L (ref 21–32)
CREAT BLD-MCNC: 1.23 MG/DL
DEPRECATED RDW RBC AUTO: 51.1 FL (ref 35.1–46.3)
EGFRCR SERPLBLD CKD-EPI 2021: 43 ML/MIN/1.73M2 (ref 60–?)
EOSINOPHIL # BLD AUTO: 0.39 X10(3) UL (ref 0–0.7)
EOSINOPHIL NFR BLD AUTO: 2.9 %
ERYTHROCYTE [DISTWIDTH] IN BLOOD BY AUTOMATED COUNT: 17.5 % (ref 11–15)
GLOBULIN PLAS-MCNC: 3.2 G/DL (ref 2–3.5)
GLUCOSE BLD-MCNC: 261 MG/DL (ref 70–99)
GLUCOSE BLDC GLUCOMTR-MCNC: 172 MG/DL (ref 70–99)
GLUCOSE BLDC GLUCOMTR-MCNC: 181 MG/DL (ref 70–99)
GLUCOSE BLDC GLUCOMTR-MCNC: 266 MG/DL (ref 70–99)
GLUCOSE BLDC GLUCOMTR-MCNC: 290 MG/DL (ref 70–99)
GLUCOSE BLDC GLUCOMTR-MCNC: 309 MG/DL (ref 70–99)
HCT VFR BLD AUTO: 22.2 %
HGB BLD-MCNC: 6.4 G/DL
IMM GRANULOCYTES # BLD AUTO: 0.1 X10(3) UL (ref 0–1)
IMM GRANULOCYTES NFR BLD: 0.7 %
ISTAT ACTIVATED CLOTTING TIME: 210 SECONDS (ref 125–137)
LYMPHOCYTES # BLD AUTO: 1.44 X10(3) UL (ref 1–4)
LYMPHOCYTES NFR BLD AUTO: 10.8 %
MAGNESIUM SERPL-MCNC: 1.5 MG/DL (ref 1.6–2.6)
MCH RBC QN AUTO: 23 PG (ref 26–34)
MCHC RBC AUTO-ENTMCNC: 28.8 G/DL (ref 31–37)
MCV RBC AUTO: 79.9 FL
MONOCYTES # BLD AUTO: 1.26 X10(3) UL (ref 0.1–1)
MONOCYTES NFR BLD AUTO: 9.4 %
NEUTROPHILS # BLD AUTO: 10.12 X10 (3) UL (ref 1.5–7.7)
NEUTROPHILS # BLD AUTO: 10.12 X10(3) UL (ref 1.5–7.7)
NEUTROPHILS NFR BLD AUTO: 75.8 %
OSMOLALITY SERPL CALC.SUM OF ELEC: 305 MOSM/KG (ref 275–295)
PLATELET # BLD AUTO: 353 10(3)UL (ref 150–450)
POTASSIUM SERPL-SCNC: 3.5 MMOL/L (ref 3.5–5.1)
PROT SERPL-MCNC: 6.4 G/DL (ref 5.7–8.2)
RBC # BLD AUTO: 2.78 X10(6)UL
SODIUM SERPL-SCNC: 141 MMOL/L (ref 136–145)
WBC # BLD AUTO: 13.4 X10(3) UL (ref 4–11)

## 2025-01-24 PROCEDURE — B3101ZZ FLUOROSCOPY OF THORACIC AORTA USING LOW OSMOLAR CONTRAST: ICD-10-PCS | Performed by: STUDENT IN AN ORGANIZED HEALTH CARE EDUCATION/TRAINING PROGRAM

## 2025-01-24 PROCEDURE — 37236 OPEN/PERQ PLACE STENT 1ST: CPT | Performed by: STUDENT IN AN ORGANIZED HEALTH CARE EDUCATION/TRAINING PROGRAM

## 2025-01-24 PROCEDURE — 70450 CT HEAD/BRAIN W/O DYE: CPT | Performed by: OTHER

## 2025-01-24 PROCEDURE — 99223 1ST HOSP IP/OBS HIGH 75: CPT | Performed by: INTERNAL MEDICINE

## 2025-01-24 PROCEDURE — 74176 CT ABD & PELVIS W/O CONTRAST: CPT | Performed by: HOSPITALIST

## 2025-01-24 PROCEDURE — B3121ZZ FLUOROSCOPY OF LEFT SUBCLAVIAN ARTERY USING LOW OSMOLAR CONTRAST: ICD-10-PCS | Performed by: STUDENT IN AN ORGANIZED HEALTH CARE EDUCATION/TRAINING PROGRAM

## 2025-01-24 PROCEDURE — 70450 CT HEAD/BRAIN W/O DYE: CPT | Performed by: STUDENT IN AN ORGANIZED HEALTH CARE EDUCATION/TRAINING PROGRAM

## 2025-01-24 PROCEDURE — 36140 INTRO NDL ICATH UPR/LXTR ART: CPT | Performed by: STUDENT IN AN ORGANIZED HEALTH CARE EDUCATION/TRAINING PROGRAM

## 2025-01-24 PROCEDURE — 30233N1 TRANSFUSION OF NONAUTOLOGOUS RED BLOOD CELLS INTO PERIPHERAL VEIN, PERCUTANEOUS APPROACH: ICD-10-PCS | Performed by: HOSPITALIST

## 2025-01-24 PROCEDURE — B547ZZA ULTRASONOGRAPHY OF LEFT SUBCLAVIAN VEIN, GUIDANCE: ICD-10-PCS | Performed by: HOSPITALIST

## 2025-01-24 PROCEDURE — 99233 SBSQ HOSP IP/OBS HIGH 50: CPT | Performed by: INTERNAL MEDICINE

## 2025-01-24 PROCEDURE — 05H633Z INSERTION OF INFUSION DEVICE INTO LEFT SUBCLAVIAN VEIN, PERCUTANEOUS APPROACH: ICD-10-PCS | Performed by: HOSPITALIST

## 2025-01-24 PROCEDURE — 03743DZ DILATION OF LEFT SUBCLAVIAN ARTERY WITH INTRALUMINAL DEVICE, PERCUTANEOUS APPROACH: ICD-10-PCS | Performed by: STUDENT IN AN ORGANIZED HEALTH CARE EDUCATION/TRAINING PROGRAM

## 2025-01-24 PROCEDURE — 99152 MOD SED SAME PHYS/QHP 5/>YRS: CPT | Performed by: STUDENT IN AN ORGANIZED HEALTH CARE EDUCATION/TRAINING PROGRAM

## 2025-01-24 PROCEDURE — 30233R1 TRANSFUSION OF NONAUTOLOGOUS PLATELETS INTO PERIPHERAL VEIN, PERCUTANEOUS APPROACH: ICD-10-PCS | Performed by: HOSPITALIST

## 2025-01-24 PROCEDURE — 93970 EXTREMITY STUDY: CPT | Performed by: INTERNAL MEDICINE

## 2025-01-24 PROCEDURE — 99233 SBSQ HOSP IP/OBS HIGH 50: CPT | Performed by: OTHER

## 2025-01-24 PROCEDURE — 36200 PLACE CATHETER IN AORTA: CPT | Performed by: STUDENT IN AN ORGANIZED HEALTH CARE EDUCATION/TRAINING PROGRAM

## 2025-01-24 RX ORDER — HYDROMORPHONE HYDROCHLORIDE 1 MG/ML
0.2 INJECTION, SOLUTION INTRAMUSCULAR; INTRAVENOUS; SUBCUTANEOUS EVERY 4 HOURS PRN
Status: DISCONTINUED | OUTPATIENT
Start: 2025-01-24 | End: 2025-02-05

## 2025-01-24 RX ORDER — SODIUM CHLORIDE 9 MG/ML
INJECTION, SOLUTION INTRAVENOUS CONTINUOUS
Status: DISCONTINUED | OUTPATIENT
Start: 2025-01-24 | End: 2025-01-26

## 2025-01-24 RX ORDER — MAGNESIUM OXIDE 400 MG/1
800 TABLET ORAL ONCE
Status: DISCONTINUED | OUTPATIENT
Start: 2025-01-24 | End: 2025-01-24

## 2025-01-24 RX ORDER — HEPARIN SODIUM 1000 [USP'U]/ML
INJECTION, SOLUTION INTRAVENOUS; SUBCUTANEOUS
Status: COMPLETED
Start: 2025-01-24 | End: 2025-01-24

## 2025-01-24 RX ORDER — SODIUM CHLORIDE 9 MG/ML
INJECTION, SOLUTION INTRAVENOUS ONCE
Status: COMPLETED | OUTPATIENT
Start: 2025-01-24 | End: 2025-01-24

## 2025-01-24 RX ORDER — MAGNESIUM SULFATE HEPTAHYDRATE 40 MG/ML
2 INJECTION, SOLUTION INTRAVENOUS ONCE
Status: COMPLETED | OUTPATIENT
Start: 2025-01-24 | End: 2025-01-25

## 2025-01-24 RX ORDER — IOPAMIDOL 612 MG/ML
320 INJECTION, SOLUTION INTRAVASCULAR
Status: COMPLETED | OUTPATIENT
Start: 2025-01-24 | End: 2025-01-24

## 2025-01-24 RX ORDER — PROTAMINE SULFATE 10 MG/ML
INJECTION, SOLUTION INTRAVENOUS
Status: COMPLETED
Start: 2025-01-24 | End: 2025-01-24

## 2025-01-24 RX ORDER — LIDOCAINE HYDROCHLORIDE 20 MG/ML
INJECTION, SOLUTION INFILTRATION; PERINEURAL
Status: COMPLETED
Start: 2025-01-24 | End: 2025-01-24

## 2025-01-24 RX ORDER — MIDAZOLAM HYDROCHLORIDE 1 MG/ML
INJECTION INTRAMUSCULAR; INTRAVENOUS
Status: COMPLETED
Start: 2025-01-24 | End: 2025-01-24

## 2025-01-24 NOTE — OPERATIVE REPORT
[facility]    PATIENTS NAME: Chester Bautista  ATTENDING PHYSICIAN: Bonny Carranza MD  OPERATING PHYSICIAN: Lisandra Long MD  CSN: [id number]     LOCATION:  CATH LAB  MRN: [id number]    YOB: 1940  ADMISSION DATE: 1/17/2025  OPERATION DATE: 1/24/2025                CATH LAB PROCEDURE REPORT       PRE-OPERATIVE DIAGNOSIS: Left subclavian artery stenosis, Aortic arch thrombosis    POST-OPERATIVE DIAGNOSIS: Same as above.    PROCEDURE PERFORMED:   Left Subclavian artery angiogram with stneting    ANESTHESIA: Moderate conscious sedation using Versed and Fentanyl was provided.  The RN monitored the oxygen, heart rate and blood pressure under my direct supervision during the course of the procedure.    SURGEON: Lisandra Long MD    SEDATION TIME: 130 min    SEDATION MEDICATION:   Versed: 4 mg  Fentanyl: 100 mcg    CONTRAST AMOUNT: 320 ml    EBL: 20 ml    COMPLICATIONS: None    SUMMARY OF CASE: Left subclavian and vertebral artery angiogram and stenting    INDICATIONS: The patient is a 84 year old female with left subclavian artery stenosis and aortic arch thrombus. She was felt to be an acceptable candidate for endovascular revascularization. We discussed the benefits of the angiogram and the risks of acute thrombosis, distal embolization, nerve injury, restenosis, dissection or poorly controlled bleeding (which may be manifested as free hemorrhage or contained hematoma in the femoral or retroperitoneal areas), pseudoaneurysm, or arteriovenous fistula, infection, renal function worsening and need for open surgery among other complications. Hemorrhage and hematoma are usually evident within 12 hours after the procedure while other complications, such as pseudoaneurysm and arteriovenous fistula, may not become apparent for days to weeks afterward.  Patient understood and all questions were answered.    DESCRIPTION OF PROCEDURE:  The patient was brought to the operating room and placed on the  angiography table in the supine position. The left femoral area was prepped and draped in a standard session. Time-outs were performed using both preinduction and pre-incision safety checklists to verify correct patient, procedure, site, and additional critical information prior to beginning the procedure.  The area over the left femoral artery was anesthetized with 1% Xylocaine. Percutaneous cannulation of the let femoral artery was achieved using an 18-gauge vascular access needle. A micropuncture sheath was advanced under direct fluoroscopy.  Then the micropuncture wire was exchanged with a 0.035” Bentson wire, which was advanced through the micropuncture catheter and under fluoroscopy up the aorta. The micropuncture catheter was removed and a 6-Tristanian short [place] sheath was first placed over the Bentson wire.  Combination of Omni Flush catheter and Bentson wire was used to advance the catheter into the ascending aorta.  Multiple aortic angiogram where performed to map out the left subclavian origin and the left vertebral artery origin.  There was no significant stenosis notified of the origin of the left subclavian artery however the origin and the proximal portion of the left subclavian artery where diseased.  Since the patient was symptomatic with posterior circulation stroke, we decided to go ahead and treat the origin of the left subclavian artery.  At this point we gave the patient 7000 of heparin as a bolus and continued dosing the patient with heparin to keep the ACT above 250.    Over the Bentson wire, we exchanged for short 6 Tristanian sheath with a 45 cm 7 Tristanian destination catheter.  Combination of 4 Tristanian Cobra catheter and Glidewire advantage was used to access left subclavian artery.  After advancing the catheter over guidewire in the left subclavian artery, limited angiogram confirmed appropriate placement of the catheter into the artery.  At this point we decided to treat the left subclavian  artery origin stenosis.  Advancing the Glidewire advantage back into the left subclavian artery, we removed the catheter over the wire.  Then we advanced an 8 mm x 39 mm VBX stent across the left subclavian origin and deployed to have one third of the stent extending into the aortic lumen.  Completion arteriogram demonstrates a stent in good position. It was fully deployed. There was good flow across the stented lesion. Vertebral artery was patent. There was no evidence of dissection. There was no evidence of extravasation. Heparin was then reversed, and the catheter, wire, and   sheath were removed.  Left femoral arteriotomy was closed by using preclosure device.  Gentle pressure was applied to achieve total hemostasis.  Following the procedure patient has significant headache.  It was decided to obtain an emergent CT head to rule out any intracranial event.  Patient was transferred to the CT scanner.

## 2025-01-24 NOTE — CONSULTS
Initial Pulmonary/ICU/Critical Care Consultation        Reason for Consultation: GIB, SAH  Referring Physician: Dr. Carranza    Pt unable to provide much info this afternoon so some info obtained from her dtr at bedside. 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 Parker), breast CA, DM, HTN, PONV, PE s/p IVC filter that has been removed since, RA admitted with severe vertigo type symptoms on 1/17/25.   On admission,seen by neurology. MRI brain showed acute CVA of left cerebellum and recommended starting plavix. GI was consulted to approve initiation of plavix given hx of AVMs. Further workup with CT angio of the head and neck was notable for bilateral antegrade vertebral artery flow, however there was mild to moderate stenosis at the origin of the left vertebral artery and there was a thrombus in the aortic arch encroaching to the origin of the left subclavian artery. Vascular surgery was consulted. Her hospital course has been complicated with another stroke identified in the left thalamus on MRI on 1/19/2025.   Pt was subsequently started on brilinta and vascular surgery took the pt for a left upper extremity, aortic arch and cerebral angiogram. Pt underwent stent placement in left subclavian artery. However, the pt complained of headaches post intervention, a subsequent CT head revealed contrast extravasation versus SAH.   Pt is now transferred to ICU for q1 neurochecks  Of note, pt is also more anemic today with hg drop to 6.4. she reports her abdomen hurts since this am. She just had a BM that was very dark concerning for GIB.      REVIEW OF SYSTEMS:  Positives and negatives as noted in HPI. All other review of systems otherwise are either limited (due to pt/family inability to provide) or negative.      PAST MEDICAL HISTORY:  Past Medical History:   Diagnosis Date     Anxiety state     Cancer (HCC)     breast cancer 2018    Cerebellar stroke (HCC) 1/17/2025    Chronic obstructive pulmonary disease, unspecified (HCC) 05/04/2023    Chronic obstructive pulmonary disease, unspecified (HCC) 05/04/2023    COPD (chronic obstructive pulmonary disease) (HCC)     Cystic thyroid nodule 12/11/2024    Diabetes (HCC)     Diabetes mellitus (HCC)     Essential hypertension     Exposure to medical diagnostic radiation     High blood pressure     High cholesterol     History of blood transfusion 07/2022    low hemoglobin    Osteoarthritis     PONV (postoperative nausea and vomiting)     Pulmonary embolism (HCC)     Pulmonary emphysema (HCC)     Rheumatoid arthritis (HCC)          PAST SURGICAL HISTORY:  Past Surgical History:   Procedure Laterality Date    Cataract extraction w/  intraocular lens implant Bilateral 2017    Dr in [facility]     Colonoscopy      Colonoscopy N/A 07/21/2022    Procedure: COLONOSCOPY;  Surgeon: Mikey Garcia MD;  Location: [facility] ENDOSCOPY    Colonoscopy N/A 06/15/2023    Procedure: COLONOSCOPY;  Surgeon: Mikey Garcia MD;  Location: [facility] ENDOSCOPY    Correct bunion,simple      right foot  1993    Egd  12/21/2023    Dr. Garcia; Duodenal AVM's x4 cauterized    Hysterectomy      1972, no abnormal Paps, due to heavy bleeding with fibroids.  Not sure if it had bilateral salpingo-oophorectomy.    Ir aneurysm repairm  2010    Computer assisted volumetric craniofomy with clipping of anterior cerebral artery.    Lumpectomy right      Radiation right      Rotator cuff repair      1993    Total knee replacement Right 2010    Total knee replacement Left 07/02/2015    Transoral incisionless fundoplication - internal  01/25/2012 Fredy fundoplication at [facility] Dr. Fournier.    Fredy fundoplication at [facility] Dr. Fournier.    Trigger finger release      left hand  2005    Yag capsulotomy - ou - both eyes Bilateral 09/2021    done in [place]          PAST SOCIAL HISTORY:  Social History     Socioeconomic History    Marital status:    Tobacco Use    Smoking status: Former     Current packs/day: 0.00     Types: Cigarettes     Quit date:      Years since quittin.0     Passive exposure: Never    Smokeless tobacco: Never    Tobacco comments:     [id number]   Vaping Use    Vaping status: Never Used   Substance and Sexual Activity    Alcohol use: Never    Drug use: Never         PAST FAMILY HISTORY:  Family History   Problem Relation Age of Onset    Heart Surgery Mother         Leaky valve at 41 y/o.     Prostate Cancer Brother     Cancer Brother     Diabetes Maternal Grandmother     Diabetes Paternal Aunt     Breast Cancer Self 78    Breast Cancer Niece         before 50    Macular degeneration Neg     Glaucoma Neg          ALLERGIES:  Allergies[1]      MEDS:  Home Medications:  Medications Taking[2]    Scheduled Medication:   sodium chloride   Intravenous Once    rosuvastatin  10 mg Oral Nightly    sennosides  8.6 mg Oral BID    ticagrelor  90 mg Oral Q12H    cefepime  1 g Intravenous Q24H    cetirizine  5 mg Oral Daily    famotidine  20 mg Oral Daily    docusate sodium  100 mg Oral BID    carvedilol  12.5 mg Oral BID with meals    fluticasone-salmeterol  1 puff Inhalation 2 times per day    gabapentin  100 mg Oral Nightly    montelukast  10 mg Oral Nightly    predniSONE  5 mg Oral Daily    losartan  50 mg Oral Daily    hydroxychloroquine  300 mg Oral Daily    insulin aspart  1-11 Units Subcutaneous TID CC    insulin degludec  10 Units Subcutaneous Nightly     Continuous Infusing Medication:   sodium chloride 100 mL/hr at 25 1156    sodium chloride Stopped (25 1630)     PRN Medications:    HYDROmorphone    LORazepam    acetaminophen    polyethylene glycol (PEG 3350)    magnesium hydroxide    bisacodyl    fleet enema    meclizine    acetaminophen **OR** acetaminophen    hydrALAzine    ondansetron    metoclopramide    acetaminophen     ondansetron    albuterol    cyclobenzaprine    oxyCODONE       PHYSICAL EXAM:  BP (!) 174/77 (BP Location: Left arm)   Pulse 88   Temp 98 °F (36.7 °C) (Oral)   Resp 18   Ht 5' 1\" (1.549 m)   Wt 135 lb 9.6 oz (61.5 kg)   SpO2 100%   BMI 25.62 kg/m²      CONSTITUTIONAL: sleepy but no apparent distress  HEENT: atraumatic normocephalic  MOUTH: mucous membranes are moist. No OP exudates  NECK/THROAT: no JVD. Trachea midline. No obvious thyromegaly  LUNG: clear upper b/l no wheezing, crackles. Chest symmetric with respiratory motion  HEART: regular rate and rhythm, no obvious murmers or gallops noted  ABD: soft but tender. + bowel sounds. No organomegaly noted  EXT: no clubbing, cyanosis, or edema noted. Pulses intact grossly  NEURO/MUSCULOSKELETAL: no obvious gross deficits  SKIN: warm, dry. No obvious lesions noted  LYMPH: no obvious LAD  Right port       IMAGES:   CT brain 1/24/25  CONCLUSION:   1. Retained contrast material throughout the large intracranial vessels and cerebral sulci from same date cardiac angiogram.  Please note that this finding limits evaluation for detection of potential acute intracranial hemorrhage.  Aforementioned sulcal    enhancement most likely relates to a contrast staining.  However, if symptoms or strong clinical suspicion for acute intracranial hemorrhage persist, a short interval 6-12 hour follow-up head CT is recommended.   2. Subcentimeter enhancing foci in the left cerebellum (x2) likely relate to known enhancing subacute lacunar type infarcts.  Continued follow-up to ensure resolution of enhancement is advised.   3. Midline frontal craniotomy and anterior parafalcine aneurysm clipping.   4. Intracranial atherosclerosis.   5. Stable chronic moderate adenoid enlargement.       MRI brain 1/19/25  CONCLUSION:   1. Interval development of a punctate lacunar infarct of the left thalamus, concerning for acute ischemia.   2. Subacute lacunar infarcts of the left cerebellar  hemisphere.    3. Postoperative changes of frontal craniotomy and parafalcine aneurysm clipping.   4. Senescent changes of parenchymal volume loss with mild sequela of chronic microvascular ischemic disease.   5. Lesser incidental findings as above.     CT brain 1/19/25  1. Small focus of hypoattenuation involving the left cerebellar hemisphere, which corresponds to the acute infarct demonstrated on the prior MRI brain dated 01/17/2025.   2. Otherwise, no transcortical area of hypoattenuation to suggest an acute infarct.  If there is clinical concern for an acute infarct, consider further evaluation with an MRI of the brain.   3. No acute intracranial hemorrhage, midline shift, mass effect, or hydrocephalus.   4. Redemonstrated postoperative changes from prior frontal craniotomy for aneurysm clipping along the course of the anterior cerebral arteries.  The associated metallic artifact from the aneurysm clips limits evaluation of the adjacent structures.   5. Lesser incidental findings described above.       LE dopplers 1/18/25  No DVT in either lower extremity.     MRI brain 1/18/25  CONCLUSION:   Limited 3-sequence stroke protocol study was performed. Within these parameters:   1. Acute to early subacute lacunar infarctions in the left cerebellar hemisphere.   2. Stable postoperative changes from a remote frontal craniotomy and anterior parafalcine aneurysm clipping.   3. Stable mild chronic microangiopathic ischemic changes.     CT C/A/P with contrast 1/17/25  1. Acute pulmonary emboli involving the bilateral lower lobe subsegmental pulmonary artery branches.   2. No evidence of aortic dissection.   3. Ascending thoracic aortic ectasia. Multifocal irregular partially ulcerated peripheral mural thrombus is observed throughout the thoracic and abdominal aorta.    4. Dilatation of the main pulmonary artery trunk may relate to underlying pulmonary hypertension.   5. Moderate to severe centrilobular/paraseptal  emphysema with extensive postradiation fibrosis and chronic opacity of the right middle lobe.   6. Multifocal pleuroparenchymal scarring is apparent.   7. Proximal distal colonic diverticulosis without CT evidence of acute complication in the imaged volume.   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   9. Lesser incidental findings as above.     CTA brain 1/17/25  CONCLUSION:   1. Negative for hemodynamically significant stenosis or occlusion of the anterior or posterior circulations.    2. The internal carotid arteries demonstrate tortuosity proximally without evidence of hemodynamically stenosis or occlusion.   3. The vertebral arteries demonstrate contiguous patency bilaterally without evidence of flow-limiting stenosis.   4. 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 Thrombus extends into the proximal left subclavian artery origin, contributing to luminal narrowing.   5. Dilatation of the main pulmonary artery trunk may relate to underlying pulmonary hypertension.   6. 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    7. Lesser incidental findings as above.       LABS:  Recent Labs   Lab 01/22/25  0659 01/23/25  0702 01/24/25  1519   RBC 3.36* 3.17* 2.78*   HGB 7.8* 7.7* 6.4*   HCT 26.4* 26.2* 22.2*   MCV 78.6* 82.6 79.9*   MCH 23.2* 24.3* 23.0*   MCHC 29.5* 29.4* 28.8*   RDW 17.8* 17.8* 17.5*   NEPRELIM 6.98 7.74* 10.12*   WBC 11.1* 12.1* 13.4*   .0 344.0 353.0       Recent Labs   Lab 01/21/25  0649 01/22/25  0659 01/23/25  0702 01/24/25  1519   * 185* 193* 261*   BUN 20 23 19 23   CREATSERUM 1.23* 1.25* 1.24* 1.23*   EGFRCR 43* 42* 43* 43*   CA 10.0 10.3 10.3 9.7   ALB 3.4 3.5 3.6 3.2    139 139 141   K 4.3 3.7 4.0  4.0 3.5    107 106 105   CO2 26.0 26.0 23.0 27.0   ALKPHO 80  --  82 74   AST 16  --  18 18   ALT <7*  --  <7* <7*   BILT 0.3  --  0.2 0.2   TP 6.8  --  6.6 6.4         ASSESSMENT/PLAN:  CVA x 2 and found to have left aortic  arch and subclavian thrombus  -s/p stent placement on 1/24/25. Post op pt developed a severe HA   -imaging concerning for possible SAH  -NS and neurology following  -q1 neurochecks  -SBP control/parameters < 140 as per NS and neurology recommendations  -on brilinta. Previous to that was on plavix. Hold as per NS and neurology  -repeat CT head as per NS/neurology  -monitor in ICU    Acute b/l pulmonary emboli  -previous IVC filter removed  -LE dopplers neg on 1/18/25  -recheck LE dopplers today. If + then may need IVC filter again     Hx of AVMs s/p cauterization in the past and on octreotide outpt infusions  -hg downtrended today and pt complaining of abd pain. Just had a dark BM  -transfuse 1 unit pRBC. Check q 8 H/H  -was initially seen by GI, will need GI to come back and see the pt again  -PPI  -NPO    CPFE  -advair  -supp 02 as needed    Klebsiella UTI  -on cefepime     DM  -insulin and accuchecks    Proph:  -DVT: SCDs    Discussed with dtr at bedside    Thank you for the opportunity to care for Chester Bautista.      CHAU Roque DO, MPH  Pulmonary Critical Care Medicine  [facility]                         [1]   Allergies  Allergen Reactions    Celebrex [Celecoxib] PALPITATIONS    Penicillins ITCHING and SWELLING    Amlodipine OTHER (SEE COMMENTS)     Calves Swelling     Metformin And Related UNKNOWN    Olmesartan UNKNOWN   [2]   Current Facility-Administered Medications for the 1/17/25 encounter (Hospital Encounter)   Medication Dose Route Frequency Provider Last Rate Last Admin    Certolizumab Pegol  mg  400 mg Subcutaneous Q14 Days Ashia Goodrich MD   400 mg at 01/09/25 1225    octreoTIDe (Sandostatin LAR) IM injection 20 mg  20 mg Intramuscular Q30 Days Zoila Mcqueen MD         Outpatient Medications Marked as Taking for the 1/17/25 encounter (Hospital Encounter)   Medication Sig Dispense Refill    acetaminophen 325 MG Oral Tab Take 2 tablets  (650 mg total) by mouth every 8 (eight) hours.      rosuvastatin 20 MG Oral Tab Take 1 tablet (20 mg total) by mouth nightly. 90 tablet 3    magnesium oxide 400 MG Oral Tab Take 1 tablet (400 mg total) by mouth in the morning, at noon, and at bedtime. Per Dr. JENSEN Mata      valsartan 80 MG Oral Tab 1  1/2 tabs every 12 hrs. (Patient taking differently: Take 1 tablet (80 mg total) by mouth 2 (two) times daily. 1  1/2 tabs every 12 hrs.) 270 tablet 3    loratadine 10 MG Oral Tab TAKE 1 TABLET BY MOUTH EVERY DAY 30 tablet 1    Potassium Chloride ER 20 MEQ Oral Tab CR Take 40 mEq by mouth every morning. 60 tablet 1    hydroxychloroquine 200 MG Oral Tab Take 1.5 tablets (300 mg total) by mouth daily. 135 tablet 1    predniSONE 5 MG Oral Tab Take 1 tablet (5 mg total) by mouth daily. 90 tablet 1    famotidine 20 MG Oral Tab Take 1 tablet (20 mg total) by mouth 2 (two) times daily. Patient gets over the counter      glipiZIDE 5 MG Oral Tab Take 1 tablet (5 mg total) by mouth every morning before breakfast. 90 tablet 0    albuterol 108 (90 Base) MCG/ACT Inhalation Aero Soln TAKE 2 PUFFS BY MOUTH EVERY 4 TO 6 HOURS AS NEEDED 6.7 each 5    cyclobenzaprine 10 MG Oral Tab Take 1 tablet (10 mg total) by mouth nightly as needed. 90 tablet 1    insulin glargine (LANTUS SOLOSTAR) 100 UNIT/ML Subcutaneous Solution Pen-injector Inject 10 Units into the skin daily with dinner. (Patient taking differently: Inject 14 Units into the skin daily with dinner.) 3 mL 1    montelukast 10 MG Oral Tab Take 1 tablet (10 mg total) by mouth nightly. 90 tablet 3    carvedilol 12.5 MG Oral Tab Take 1 tablet (12.5 mg total) by mouth 2 (two) times daily with meals. 180 tablet 3    fluticasone-salmeterol (WIXELA INHUB) 250-50 MCG/ACT Inhalation Aerosol Powder, Breath Activated Inhale 1 puff into the lungs every 12 (twelve) hours. 1 each 5    gabapentin 100 MG Oral Cap Take 1 capsule (100 mg total) by mouth nightly. 90 capsule 3

## 2025-01-24 NOTE — PROGRESS NOTES
[facility]  part of [facility]    Progress Note    Chester Bautista Patient Status:  Inpatient    1940 MRN [id number]   Location [facility] 3W/SW Attending Richard Guerra MD   Hosp Day # 6 PCP Heike Sorto MD       Subjective:   Sitting up comfortably and in NAD.   Denied any active complaints at the time of interview.  Scheduled for surgery later today, nervous. All questions and concerns addressed.     Objective:   Blood pressure 138/61, pulse 94, temperature 98.4 °F (36.9 °C), temperature source Oral, resp. rate 18, height 5' 1\" (1.549 m), weight 135 lb 9.6 oz (61.5 kg), SpO2 98%.    General: AAF, NAD  Respiratory: Clear to auscultation bilaterally. No wheezes. No rhonchi.  Cardiovascular: RRR  Abdomen: Soft, nontender, nondistended.  Neurologic: No focal neurological deficits.   Musculoskeletal: Moves all extremities.  Extremities: No edema or cyanosis.    Current Inpatient Medications:     Current Facility-Administered Medications:     sodium chloride 0.9% infusion, , Intravenous, Continuous    rosuvastatin (Crestor) tab 10 mg, 10 mg, Oral, Nightly    sennosides (Senokot) tab 8.6 mg, 8.6 mg, Oral, BID    LORazepam (Ativan) tab 0.5 mg, 0.5 mg, Oral, PRN    ticagrelor (Brilinta) tab 90 mg, 90 mg, Oral, Q12H    ceFEPIme (Maxipime) 1 g in sodium chloride 0.9% 100 mL IVPB-MBP, 1 g, Intravenous, Q24H    cetirizine (ZyrTEC) tab 5 mg, 5 mg, Oral, Daily    famotidine (Pepcid) tab 20 mg, 20 mg, Oral, Daily    acetaminophen (Tylenol Extra Strength) tab 500 mg, 500 mg, Oral, Q6H PRN    docusate sodium (Colace) cap 100 mg, 100 mg, Oral, BID    polyethylene glycol (PEG 3350) (Miralax) 17 g oral packet 17 g, 17 g, Oral, Daily PRN    magnesium hydroxide (Milk of Magnesia) 400 MG/5ML oral suspension 30 mL, 30 mL, Oral, Daily PRN    bisacodyl (Dulcolax) 10 MG rectal suppository 10 mg, 10 mg, Rectal, Daily PRN    fleet enema (Fleet) rectal enema 133 mL, 1 enema, Rectal, Once  PRN    meclizine (Antivert) tab 12.5 mg, 12.5 mg, Oral, TID PRN    acetaminophen (Tylenol) tab 650 mg, 650 mg, Oral, Q4H PRN **OR** acetaminophen (Tylenol) rectal suppository 650 mg, 650 mg, Rectal, Q4H PRN    hydrALAzine (Apresoline) 20 mg/mL injection 10 mg, 10 mg, Intravenous, Q2H PRN    ondansetron (Zofran) 4 MG/2ML injection 4 mg, 4 mg, Intravenous, Q6H PRN    metoclopramide (Reglan) 5 mg/mL injection 5 mg, 5 mg, Intravenous, Q8H PRN    acetaminophen (Tylenol) tab 650 mg, 650 mg, Oral, Q6H PRN    ondansetron (Zofran) 4 MG/2ML injection 4 mg, 4 mg, Intravenous, Q6H PRN    albuterol (Ventolin) (2.5 MG/3ML) 0.083% nebulizer solution 2.5 mg, 2.5 mg, Nebulization, Q4H PRN    carvedilol (Coreg) tab 12.5 mg, 12.5 mg, Oral, BID with meals    cyclobenzaprine (Flexeril) tab 10 mg, 10 mg, Oral, Nightly PRN    fluticasone-salmeterol (Advair Diskus) 250-50 MCG/ACT inhaler 1 puff, 1 puff, Inhalation, 2 times per day    gabapentin (Neurontin) cap 100 mg, 100 mg, Oral, Nightly    montelukast (Singulair) tab 10 mg, 10 mg, Oral, Nightly    predniSONE (Deltasone) tab 5 mg, 5 mg, Oral, Daily    losartan (Cozaar) tab 50 mg, 50 mg, Oral, Daily    hydroxychloroquine (Plaquenil) tab 300 mg, 300 mg, Oral, Daily    oxyCODONE immediate release tab 5 mg, 5 mg, Oral, Q6H PRN    insulin aspart (NovoLOG) 100 Units/mL FlexPen 1-11 Units, 1-11 Units, Subcutaneous, TID CC    insulin degludec (Tresiba) 100 units/mL flextouch 10 Units, 10 Units, Subcutaneous, Nightly      Results:     Recent Labs   Lab 01/21/25  0649 01/22/25  0659 01/23/25  0702   RBC 3.39* 3.36* 3.17*   HGB 8.4* 7.8* 7.7*   HCT 27.7* 26.4* 26.2*   MCV 81.7 78.6* 82.6   MCH 24.8* 23.2* 24.3*   MCHC 30.3* 29.5* 29.4*   RDW 17.9* 17.8* 17.8*   NEPRELIM 7.85* 6.98 7.74*   WBC 11.9* 11.1* 12.1*   .0 376.0 344.0         Recent Labs   Lab 01/21/25  0649 01/22/25  0659 01/23/25  0702   * 185* 193*   BUN 20 23 19   CREATSERUM 1.23* 1.25* 1.24*   EGFRCR 43* 42* 43*   CA  10.0 10.3 10.3    139 139   K 4.3 3.7 4.0  4.0    107 106   CO2 26.0 26.0 23.0         Imaging:   No results found.          Assessment and Plan:   #Left Cerebellar Infarcts  - pt with new sudden onset neuro changes  - 2 small cerebellar infarcts seen on admission MRI  -Patient already took 3 baby aspirin's prior to coming to ED   - CTA negative for LVO, but showing small subsegmental Pes  - Stroke alert called 1/19 -> stroke CT non-acute, MRI showing small CVA in L thalamus   - f/u lipid panel, A1c, TSH, ECHO   - Neuro on consult  -Continue statin  -Continue Brillinta  -high risk morbidity/mortality given her history of GI bleed and now need for antiplatelet therapy and potential anticoagulation in the future. Family is aware  -Vascular surgery on consult  -scheduled for LUE, aortic arch and cerebral angiogram with possible intervention later today  -PT/OT    #Dizziness  Improved   -Likely in setting of above  -Meclizine prn   -Neurology consulted   -continue brilinta and statin     #PE   -Patient is hemodynamically stable, EKG NSR and troponin not elevated.  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 Already took 3 baby aspirins prior to admission  -Previously had IVC filter placed in March 2024 for PE at that time (removed June 2024).   -Needs eval for hypercoagulable state. Risk factors of recent car ride, overall sedentary state, breast cancer. Sees Dr. Cade Chu in clinic for anemia, needs further follow up   -given current presentation the patient was restarted on AC, close Hb monitoring     #Descending aortic arch thrombus   -Admission CTA chest revealing mural thrombus throughout the thoracic and abdominal aorta, chronic per radiology  -Vascular surgery on consult  -planning for angiogram 01/24/2025  -cont brilinta and statin for now      #HTN  -Hold home antihypertensives to allow for permissive hypertension poststroke      #Klebsiella UTI  -nitrofurantoin initiated in ED, UCx reviewed - nitrofurantoin resistant  -started on IV Cefepime  -deescalate abx as indicated    #Constipation  -bowel regimen ordered     #Anemia  -in the setting of PE and aortic arch thrombus, the decision was made to resume anticoagulation despite the patients history of bleeding AVMs  -Hb appears to be stable at this time 8.4 -> 7.8 -> 7.7  -monitor H&H  -transfuse as indicated     Chronic Medical Problems  HTN  HLD  DM  COPD/?sarcoidosis   Rheumatoid Arthritis  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

## 2025-01-24 NOTE — PROGRESS NOTES
[facility]  [address] [place] [address], [address]  [facility] [address]  [phone]            Chester Bautista Patient Status:  Inpatient    1940 MRN [id number]   Location [facility] 3W/SW Attending Bonny Carranza MD   Hosp Day # 6 PCP Heike Sorto MD     Subjective:  Chester Bautista is a(n) 84 year old female.  Saw her postoperatively with her daughter at bedside, granddaughter also at bedside, complains of severe headaches, photophobia    Current Facility-Administered Medications   Medication Dose Route Frequency    sodium chloride 0.9% infusion   Intravenous Continuous    HYDROmorphone (Dilaudid) 1 MG/ML injection 0.2 mg  0.2 mg Intravenous Q4H PRN    sodium chloride 0.9% infusion   Intravenous Continuous    rosuvastatin (Crestor) tab 10 mg  10 mg Oral Nightly    sennosides (Senokot) tab 8.6 mg  8.6 mg Oral BID    LORazepam (Ativan) tab 0.5 mg  0.5 mg Oral PRN    ticagrelor (Brilinta) tab 90 mg  90 mg Oral Q12H    ceFEPIme (Maxipime) 1 g in sodium chloride 0.9% 100 mL IVPB-MBP  1 g Intravenous Q24H    cetirizine (ZyrTEC) tab 5 mg  5 mg Oral Daily    famotidine (Pepcid) tab 20 mg  20 mg Oral Daily    acetaminophen (Tylenol Extra Strength) tab 500 mg  500 mg Oral Q6H PRN    docusate sodium (Colace) cap 100 mg  100 mg Oral BID    polyethylene glycol (PEG 3350) (Miralax) 17 g oral packet 17 g  17 g Oral Daily PRN    magnesium hydroxide (Milk of Magnesia) 400 MG/5ML oral suspension 30 mL  30 mL Oral Daily PRN    bisacodyl (Dulcolax) 10 MG rectal suppository 10 mg  10 mg Rectal Daily PRN    fleet enema (Fleet) rectal enema 133 mL  1 enema Rectal Once PRN    meclizine (Antivert) tab 12.5 mg  12.5 mg Oral TID PRN    acetaminophen (Tylenol) tab 650 mg  650 mg Oral Q4H PRN    Or    acetaminophen (Tylenol) rectal suppository 650 mg  650 mg Rectal Q4H PRN    hydrALAzine (Apresoline) 20 mg/mL injection 10 mg  10 mg Intravenous Q2H PRN    ondansetron (Zofran) 4  MG/2ML injection 4 mg  4 mg Intravenous Q6H PRN    metoclopramide (Reglan) 5 mg/mL injection 5 mg  5 mg Intravenous Q8H PRN    acetaminophen (Tylenol) tab 650 mg  650 mg Oral Q6H PRN    ondansetron (Zofran) 4 MG/2ML injection 4 mg  4 mg Intravenous Q6H PRN    albuterol (Ventolin) (2.5 MG/3ML) 0.083% nebulizer solution 2.5 mg  2.5 mg Nebulization Q4H PRN    carvedilol (Coreg) tab 12.5 mg  12.5 mg Oral BID with meals    cyclobenzaprine (Flexeril) tab 10 mg  10 mg Oral Nightly PRN    fluticasone-salmeterol (Advair Diskus) 250-50 MCG/ACT inhaler 1 puff  1 puff Inhalation 2 times per day    gabapentin (Neurontin) cap 100 mg  100 mg Oral Nightly    montelukast (Singulair) tab 10 mg  10 mg Oral Nightly    predniSONE (Deltasone) tab 5 mg  5 mg Oral Daily    losartan (Cozaar) tab 50 mg  50 mg Oral Daily    hydroxychloroquine (Plaquenil) tab 300 mg  300 mg Oral Daily    oxyCODONE immediate release tab 5 mg  5 mg Oral Q6H PRN    insulin aspart (NovoLOG) 100 Units/mL FlexPen 1-11 Units  1-11 Units Subcutaneous TID CC    insulin degludec (Tresiba) 100 units/mL flextouch 10 Units  10 Units Subcutaneous Nightly       Objective:  Blood pressure 142/71, pulse 84, temperature 98.2 °F (36.8 °C), temperature source Oral, resp. rate 18, height 61\", weight 135 lb 9.6 oz (61.5 kg), SpO2 100%.    Physical Exam:  Vitals:    01/24/25 1200 01/24/25 1215 01/24/25 1230 01/24/25 1245   BP: 156/73 106/85 (!) 168/76 142/71   Pulse: 86 85 86 84   Resp: 16 18 18 18   Temp: 98.4 °F (36.9 °C) 98.4 °F (36.9 °C) 98.2 °F (36.8 °C) 98.2 °F (36.8 °C)   TempSrc: Oral Oral Oral Oral   SpO2: 100% 100% 100% 100%   Weight:       Height:         In pain when I saw her, closing her eyes though she would open them on commands, and will track, she blinks to threat, pupils equal and reactive to light, visual field full, face is symmetric, motor power intact in upper extremities, could wiggle toes bilaterally, minimal weakness in proximal muscle of her right lower  extremity.    Lab Results   Component Value Date    WBC 12.1 (H) 01/23/2025    HGB 7.7 (L) 01/23/2025    HCT 26.2 (L) 01/23/2025    .0 01/23/2025    CREATSERUM 1.24 (H) 01/23/2025    BUN 19 01/23/2025     01/23/2025    K 4.0 01/23/2025    K 4.0 01/23/2025     01/23/2025    CO2 23.0 01/23/2025     (H) 01/23/2025    CA 10.3 01/23/2025    ALB 3.6 01/23/2025    ALKPHO 82 01/23/2025    BILT 0.2 01/23/2025    TP 6.6 01/23/2025    AST 18 01/23/2025    ALT <7 (L) 01/23/2025    PTT 27.0 01/18/2025    INR 0.94 01/17/2025    T4F 1.2 01/17/2025    TSH 0.418 (L) 01/17/2025    DDIMER 1.60 (H) 02/23/2024    ESRML >130 (H) 11/29/2024    CRP 3.00 (H) 11/29/2024    MG 1.5 (L) 01/23/2025    PHOS 4.0 01/22/2025    B12 814 11/01/2024       CT BRAIN OR HEAD (CPT=70450)    Result Date: 1/24/2025  CONCLUSION:  1. Retained contrast material throughout the large intracranial vessels and cerebral sulci from same date cardiac angiogram.  Please note that this finding limits evaluation for detection of potential acute intracranial hemorrhage.  Aforementioned sulcal  enhancement most likely relates to a contrast staining.  However, if symptoms or strong clinical suspicion for acute intracranial hemorrhage persist, a short interval 6-12 hour follow-up head CT is recommended. 2. Subcentimeter enhancing foci in the left cerebellum (x2) likely relate to known enhancing subacute lacunar type infarcts.  Continued follow-up to ensure resolution of enhancement is advised. 3. Midline frontal craniotomy and anterior parafalcine aneurysm clipping. 4. Intracranial atherosclerosis. 5. Stable chronic moderate adenoid enlargement.  elm-remote  Dictated by (CST): Jose Enrique Valente MD on 1/24/2025 at 11:33 AM     Finalized by (CST): Jose Enrique Valente MD on 1/24/2025 at 11:40 AM                   Assessment:  Patient Active Problem List   Diagnosis    Adult general medical examination    Vaccine counseling    Colon cancer screening    Type 2  diabetes mellitus without retinopathy (HCC)    Sarcoidosis    Anemia, iron deficiency    Dyslipidemia    Hypercalcemia    Hypertension    Infiltrating ductal carcinoma of right breast, stage 1 (HCC)    Rheumatoid arthritis (HCC)    Osteoarthrosis    Peripheral vascular disease due to secondary diabetes mellitus (HCC)    Neuropathy    AVM (arteriovenous malformation) of duodenum, acquired    Pseudophakia of both eyes    Vitreous floaters of both eyes    Glaucoma suspect of both eyes    Iron deficiency anemia due to chronic blood loss    Malabsorption of iron (HCC)    Port-A-Cath in place    Chronic obstructive pulmonary disease, unspecified (HCC)    Adnexal mass    Encounter for care related to vascular access port    High risk medication use    Meibomian gland dysfunction (MGD) of both eyes    Hypokalemia    Hyperkalemia    Hypomagnesemia    Constipation    Gastric AVM    AVM (arteriovenous malformation) (HCC)    Leukocytosis    Presence of IVC filter    CKD (chronic kidney disease) stage 3, GFR 30-59 ml/min (HCC)    Dry eye syndrome of both eyes    Vitamin D deficiency    Cystic thyroid nodule    Cough    Chronic fatigue    Cerebellar stroke (HCC)    Dizziness    Right hand paresthesia    Thrombus of aorta (HCC)    Bilateral pulmonary embolism (HCC)    Acute cystitis without hematuria    PE (pulmonary thromboembolism) (HCC)    Aortic thrombus (HCC)    New cerebellar infarct (HCC)     Cerebellar stroke (HCC)  Left thalamic stroke  S/p stent placement in left vertebral artery, discussed with vascular surgery-Patient complained of headaches post intervention, CT head with contrast extravasation versus SAH. History of aneurysm clipping in EVANG[place]A area.  See attached image    Has been off aspirin since her duodenal ulcer bleeding.  She has a history of AVMs in her duodenum that have been cauterized, maintained on octreotide injections requiring iron transfusions and rarely blood transfusions  Echocardiogram with ejection  fraction of 55%, mild hypokinesis of the mid anterolateral wall.  No significant change from previous echocardiogram. On Brillinta           Recommendations:  Transfer to ICU with neurochecks q 1 hour  1- Repeat CT head at 6pm  2- On Brillinta 90mg bid  3-continue high-dose rosuvastatin at 40 mg daily  5-PT/OT/ST  6-A1c 10.1 , lipid panel with LDL of 85  7- High risk morbidity/mortality given her GI bleeding history and need for antiplatelet now, anticoagulation with heparin given and reversed during the procedure     Complex patient with risk of deterioration    Dr Kang assumed care at [facility]        Neurology following     Thank you for allowing me to participate in the care of your patient.            Afia Zavala MD

## 2025-01-24 NOTE — PLAN OF CARE
Problem: HEMATOLOGIC - ADULT  Goal: Free from bleeding injury  Description: (Example usage: patient with low platelets)  INTERVENTIONS:  - Avoid intramuscular injections, enemas and rectal medication administration  - Ensure safe mobilization of patient  - Hold pressure on venipuncture sites to achieve adequate hemostasis  - Assess for signs and symptoms of internal bleeding  - Monitor lab trends  - Patient is to report abnormal signs of bleeding to staff  - Avoid use of toothpicks and dental floss  - Use electric shaver for shaving  - Use soft bristle tooth brush  - Limit straining and forceful nose blowing  Outcome: Progressing     Problem: Patient Centered Care  Goal: Patient preferences are identified and integrated in the patient's plan of care  Description: Interventions:  - What would you like us to know as we care for you? From home with daughter   - Provide timely, complete, and accurate information to patient/family  - Incorporate patient and family knowledge, values, beliefs, and cultural backgrounds into the planning and delivery of care  - Encourage patient/family to participate in care and decision-making at the level they choose  - Honor patient and family perspectives and choices  Outcome: Progressing     Problem: Diabetes/Glucose Control  Goal: Glucose maintained within prescribed range  Description: INTERVENTIONS:  - Monitor Blood Glucose as ordered  - Assess for signs and symptoms of hyperglycemia and hypoglycemia  - Administer ordered medications to maintain glucose within target range  - Assess barriers to adequate nutritional intake and initiate nutrition consult as needed  - Instruct patient on self management of diabetes  Outcome: Progressing     Problem: Patient/Family Goals  Goal: Patient/Family Long Term Goal  Description: Patient's Long Term Goal: discharge from hospital     Interventions:  - monitor vital signs   - monitor blood glucose levels  - monitor appropriate labs  - pain  management   - administer medications per order  - follow MD orders  - diagnostics per order  - update and inform patient and family on plan of care  - discharge planning  - See additional Care Plan goals for specific interventions  Outcome: Progressing  Goal: Patient/Family Short Term Goal  Description: Patient's Short Term Goal: manage pain     Interventions:   - monitor vital signs   - monitor blood glucose levels  - monitor appropriate labs  - pain management   - administer medications per order  - follow MD orders  - diagnostics per order  - update and inform patient and family on plan of care  - See additional Care Plan goals for specific interventions  Outcome: Progressing     Problem: CARDIOVASCULAR - ADULT  Goal: Maintains optimal cardiac output and hemodynamic stability  Description: INTERVENTIONS:  - Monitor vital signs, rhythm, and trends  - Monitor for bleeding, hypotension and signs of decreased cardiac output  - Evaluate effectiveness of vasoactive medications to optimize hemodynamic stability  - Monitor arterial and/or venous puncture sites for bleeding and/or hematoma  - Assess quality of pulses, skin color and temperature  - Assess for signs of decreased coronary artery perfusion - ex. Angina  - Evaluate fluid balance, assess for edema, trend weights  Outcome: Progressing  Goal: Absence of cardiac arrhythmias or at baseline  Description: INTERVENTIONS:  - Continuous cardiac monitoring, monitor vital signs, obtain 12 lead EKG if indicated  - Evaluate effectiveness of antiarrhythmic and heart rate control medications as ordered  - Initiate emergency measures for life threatening arrhythmias  - Monitor electrolytes and administer replacement therapy as ordered  Outcome: Progressing     Problem: NEUROLOGICAL - ADULT  Goal: Achieves stable or improved neurological status  Description: INTERVENTIONS  - Assess for and report changes in neurological status  - Initiate measures to prevent increased  intracranial pressure  - Maintain blood pressure and fluid volume within ordered parameters to optimize cerebral perfusion and minimize risk of hemorrhage  - Monitor temperature, glucose, and sodium. Initiate appropriate interventions as ordered  Outcome: Progressing  Goal: Achieves maximal functionality and self care  Description: INTERVENTIONS  - Monitor swallowing and airway patency with patient fatigue and changes in neurological status  - Encourage and assist patient to increase activity and self care with guidance from PT/OT  - Encourage visually impaired, hearing impaired and aphasic patients to use assistive/communication devices  Outcome: Progressing     Problem: METABOLIC/FLUID AND ELECTROLYTES - ADULT  Goal: Glucose maintained within prescribed range  Description: INTERVENTIONS:  - Monitor Blood Glucose as ordered  - Assess for signs and symptoms of hyperglycemia and hypoglycemia  - Administer ordered medications to maintain glucose within target range  - Assess barriers to adequate nutritional intake and initiate nutrition consult as needed  - Instruct patient on self management of diabetes  Outcome: Progressing  Goal: Electrolytes maintained within normal limits  Description: INTERVENTIONS:  - Monitor labs and rhythm and assess patient for signs and symptoms of electrolyte imbalances  - Administer electrolyte replacement as ordered  - Monitor response to electrolyte replacements, including rhythm and repeat lab results as appropriate  - Fluid restriction as ordered  - Instruct patient on fluid and nutrition restrictions as appropriate  Outcome: Progressing  Goal: Hemodynamic stability and optimal renal function maintained  Description: INTERVENTIONS:  - Monitor labs and assess for signs and symptoms of volume excess or deficit  - Monitor intake, output and patient weight  - Monitor urine specific gravity, serum osmolarity and serum sodium as indicated or ordered  - Monitor response to interventions for  patient's volume status, including labs, urine output, blood pressure (other measures as available)  - Encourage oral intake as appropriate  - Instruct patient on fluid and nutrition restrictions as appropriate  Outcome: Progressing     Problem: MUSCULOSKELETAL - ADULT  Goal: Return mobility to safest level of function  Description: INTERVENTIONS:  - Assess patient stability and activity tolerance for standing, transferring and ambulating w/ or w/o assistive devices  - Assist with transfers and ambulation using safe patient handling equipment as needed  - Ensure adequate protection for wounds/incisions during mobilization  - Obtain PT/OT consults as needed  - Advance activity as appropriate  - Communicate ordered activity level and limitations with patient/family  Outcome: Progressing     Problem: SKIN/TISSUE INTEGRITY - ADULT  Goal: Skin integrity remains intact  Description: INTERVENTIONS  - Assess and document risk factors for pressure ulcer development  - Assess and document skin integrity  - Monitor for areas of redness and/or skin breakdown  - Initiate interventions, skin care algorithm/standards of care as needed  Outcome: Progressing     Problem: Impaired Swallowing  Goal: Minimize aspiration risk  Description: Interventions:  - Patient should be alert and upright for all feedings (90 degrees preferred)  - Offer food and liquids at a slow rate  - No straws  - Encourage small bites of food and small sips of liquid  - Offer pills one at a time, crush or deliver with applesauce as needed  - Discontinue feeding and notify MD (or speech pathologist) if coughing or persistent throat clearing or wet/gurgly vocal quality is noted  Outcome: Progressing     Problem: NEUROLOGICAL - ADULT  Goal: Achieves stable or improved neurological status  Description: INTERVENTIONS  - Assess for and report changes in neurological status  - Initiate measures to prevent increased intracranial pressure  - Maintain blood pressure and  fluid volume within ordered parameters to optimize cerebral perfusion and minimize risk of hemorrhage  - Monitor temperature, glucose, and sodium. Initiate appropriate interventions as ordered  Outcome: Progressing  Goal: Achieves maximal functionality and self care  Description: INTERVENTIONS  - Monitor swallowing and airway patency with patient fatigue and changes in neurological status  - Encourage and assist patient to increase activity and self care with guidance from PT/OT  - Encourage visually impaired, hearing impaired and aphasic patients to use assistive/communication devices  Outcome: Progressing     Problem: DISCHARGE PLANNING  Goal: Discharge to home or other facility with appropriate resources  Description: INTERVENTIONS:  - Identify barriers to discharge w/pt and caregiver  - Include patient/family/discharge partner in discharge planning  - Arrange for needed discharge resources and transportation as appropriate  - Identify discharge learning needs (meds, wound care, etc)  - Arrange for interpreters to assist at discharge as needed  - Consider post-discharge preferences of patient/family/discharge partner  - Complete POLST form as appropriate  - Assess patient's ability to be responsible for managing their own health  - Refer to Case Management Department for coordinating discharge planning if the patient needs post-hospital services based on physician/LIP order or complex needs related to functional status, cognitive ability or social support system  Outcome: Progressing     Patient left for cath lab A&Ox3, VSS. Cath was performed and patient immediately reported a headache. CT head STAT was completed. MD cleared patient to return to floor but shortly after a transfer order to CCU was placed by neuro for continued monitoring.   Report called to MAGI Duffy. Vascular team is here to insert an IV. Patient is a difficult stick and labs have been delayed due to this.    Plan for next week is for the port  to be evaluated. Per XR dept, patient cannot have the Port XR over the weekend. Per IR dept, patient will have to be off Brilinta for \"5 days\". Dr Draper wants this to be determined by vascular team after this weekend.

## 2025-01-24 NOTE — PROGRESS NOTES
[facility] [place]  Vascular Surgery Progress Note    Chester Bautista Patient Status:  Inpatient    1940 MRN [id number]   Location [facility] [place] INTERVENTIONAL SUITES Attending Bonny Carranza MD   Hosp Day # 6 PCP Heike Sorto MD     Objective:   Temp: 98.4 °F (36.9 °C)  Pulse: 94  Resp: 18  BP: 138/61    Intake/Output:     Intake/Output Summary (Last 24 hours) at 2025 0844  Last data filed at 2025 1100  Gross per 24 hour   Intake 120 ml   Output --   Net 120 ml       Events Yesterday/Overnight:  Visited the patient at the bedside.  I had another conversation with the patient reviewing risks and benefits of today's procedure.    Exam:  Her vital signs are within normal limits.  Her motor and sensory exam are intact except for a mild weakness of proximal motor compartment of the right upper and lower extremity.    Impression/Plan:  84 year old female with past medical history of COPD, DM, HTN and gastric AVM complicated with recurrent GI bleed who presented with left cerebellar stroke on Saturday, 2025.  Her hospital course was complicated with another stroke identified in the left thalamus on MRI on 2025.  She currently has slight weakness of the right lower extremity and proximal right lower extremity.  Her sensory function is intact. Her speech is intact     Plan:  -We will proceed with the procedure per patient's request.    Medications:    Current Facility-Administered Medications:     sodium chloride 0.9% infusion, , Intravenous, Continuous    rosuvastatin (Crestor) tab 10 mg, 10 mg, Oral, Nightly    sennosides (Senokot) tab 8.6 mg, 8.6 mg, Oral, BID    LORazepam (Ativan) tab 0.5 mg, 0.5 mg, Oral, PRN    ticagrelor (Brilinta) tab 90 mg, 90 mg, Oral, Q12H    ceFEPIme (Maxipime) 1 g in sodium chloride 0.9% 100 mL IVPB-MBP, 1 g, Intravenous, Q24H    cetirizine (ZyrTEC) tab 5 mg, 5 mg, Oral, Daily    famotidine (Pepcid) tab 20 mg, 20 mg, Oral, Daily    acetaminophen  (Tylenol Extra Strength) tab 500 mg, 500 mg, Oral, Q6H PRN    docusate sodium (Colace) cap 100 mg, 100 mg, Oral, BID    polyethylene glycol (PEG 3350) (Miralax) 17 g oral packet 17 g, 17 g, Oral, Daily PRN    magnesium hydroxide (Milk of Magnesia) 400 MG/5ML oral suspension 30 mL, 30 mL, Oral, Daily PRN    bisacodyl (Dulcolax) 10 MG rectal suppository 10 mg, 10 mg, Rectal, Daily PRN    fleet enema (Fleet) rectal enema 133 mL, 1 enema, Rectal, Once PRN    meclizine (Antivert) tab 12.5 mg, 12.5 mg, Oral, TID PRN    acetaminophen (Tylenol) tab 650 mg, 650 mg, Oral, Q4H PRN **OR** acetaminophen (Tylenol) rectal suppository 650 mg, 650 mg, Rectal, Q4H PRN    hydrALAzine (Apresoline) 20 mg/mL injection 10 mg, 10 mg, Intravenous, Q2H PRN    ondansetron (Zofran) 4 MG/2ML injection 4 mg, 4 mg, Intravenous, Q6H PRN    metoclopramide (Reglan) 5 mg/mL injection 5 mg, 5 mg, Intravenous, Q8H PRN    acetaminophen (Tylenol) tab 650 mg, 650 mg, Oral, Q6H PRN    ondansetron (Zofran) 4 MG/2ML injection 4 mg, 4 mg, Intravenous, Q6H PRN    albuterol (Ventolin) (2.5 MG/3ML) 0.083% nebulizer solution 2.5 mg, 2.5 mg, Nebulization, Q4H PRN    carvedilol (Coreg) tab 12.5 mg, 12.5 mg, Oral, BID with meals    cyclobenzaprine (Flexeril) tab 10 mg, 10 mg, Oral, Nightly PRN    fluticasone-salmeterol (Advair Diskus) 250-50 MCG/ACT inhaler 1 puff, 1 puff, Inhalation, 2 times per day    gabapentin (Neurontin) cap 100 mg, 100 mg, Oral, Nightly    montelukast (Singulair) tab 10 mg, 10 mg, Oral, Nightly    predniSONE (Deltasone) tab 5 mg, 5 mg, Oral, Daily    losartan (Cozaar) tab 50 mg, 50 mg, Oral, Daily    hydroxychloroquine (Plaquenil) tab 300 mg, 300 mg, Oral, Daily    oxyCODONE immediate release tab 5 mg, 5 mg, Oral, Q6H PRN    insulin aspart (NovoLOG) 100 Units/mL FlexPen 1-11 Units, 1-11 Units, Subcutaneous, TID CC    insulin degludec (Tresiba) 100 units/mL flextouch 10 Units, 10 Units, Subcutaneous, Nightly    Laboratory/Data:    LABS:  Recent  Labs   Lab 01/17/25 2009 01/18/25  0943 01/19/25  0629 01/20/25  1240 01/21/25  0649 01/22/25  0659 01/23/25  0702   WBC  --    < > 15.5* 9.9 11.9* 11.1* 12.1*   HGB  --    < > 8.2* 9.0* 8.4* 7.8* 7.7*   MCV  --    < > 80.6 81.8 81.7 78.6* 82.6   PLT  --    < > 316.0 193.0 348.0 376.0 344.0   INR 0.94  --   --   --   --   --   --     < > = values in this interval not displayed.       Recent Labs   Lab 01/19/25  0629 01/19/25  1525 01/20/25  1241 01/21/25  0649 01/22/25  0659 01/23/25  0702     --  137 138 139 139   K 3.5 4.8 4.4 4.3 3.7 4.0  4.0     --  106 104 107 106   CO2 26.0  --  20.0* 26.0 26.0 23.0   BUN 15  --  12 20 23 19   CREATSERUM 0.89  --  1.07* 1.23* 1.25* 1.24*   *  --  140* 189* 185* 193*   CA 9.7  --  10.7* 10.0 10.3 10.3   MG  --   --   --  1.4* 1.6 1.5*   PHOS  --   --   --   --  4.0  --      Recent Labs   Lab 01/17/25 2009 01/18/25  0943   PTP 13.1  --    INR 0.94  --    PTT 27.2 27.0     Recent Labs   Lab 01/21/25  0649 01/22/25  0659 01/23/25  0702   ALT <7*  --  <7*   AST 16  --  18   ALB 3.4 3.5 3.6       Lab Results   Component Value Date    ANASCRN Negative 08/05/2022         Lisandra Long MD  [facility], Division of Vascular Surgery  1/24/2025  8:44 AM

## 2025-01-24 NOTE — PROGRESS NOTES
I was informed by the Cath Lab nursing staff that the patient is complaining from 10 out of 10 headache.  Since she had a prior left hemisphere stroke and we had to heparinize the patient for this morning procedure, I decided to proceed with stat CT head.  CT head was notable for retained contrast through out large intracranial vessels and cerebral sulci from this morning angiogram procedure versus an acute subarachnoid hemorrhage.  Patient is neurologically at her baseline with some weakness of the proximal right lower and upper extremities.  I discussed patient's situation with Dr. Ronald rivera who is going to kindly visit the patient in give recommendations regarding the patient.  We are going to repeat another CT scan of the head in 6 hours to ensure patient's symptoms are not related to subarachnoid hemorrhage.  Meanwhile we are going to take patient to the ICU for closer monitoring.  Patient remains hemodynamically stable and neurologically at her baseline.  I have updated the patient's family and answered all of their questions.

## 2025-01-24 NOTE — PROGRESS NOTES
Recurrent melena s/p stenting to SC and vertebral arteries, AC initiation with procedure  Anemia with 1 unit prbc infusing  Hemodynamics stable    Reconsulted GI services  Add PPI BID  Holding brilinta

## 2025-01-24 NOTE — PLAN OF CARE
Problem: HEMATOLOGIC - ADULT  Goal: Free from bleeding injury  Description: (Example usage: patient with low platelets)  INTERVENTIONS:  - Avoid intramuscular injections, enemas and rectal medication administration  - Ensure safe mobilization of patient  - Hold pressure on venipuncture sites to achieve adequate hemostasis  - Assess for signs and symptoms of internal bleeding  - Monitor lab trends  - Patient is to report abnormal signs of bleeding to staff  - Avoid use of toothpicks and dental floss  - Use electric shaver for shaving  - Use soft bristle tooth brush  - Limit straining and forceful nose blowing  Outcome: Progressing     Problem: Patient Centered Care  Goal: Patient preferences are identified and integrated in the patient's plan of care  Description: Interventions:  - What would you like us to know as we care for you? From home with daughter   - Provide timely, complete, and accurate information to patient/family  - Incorporate patient and family knowledge, values, beliefs, and cultural backgrounds into the planning and delivery of care  - Encourage patient/family to participate in care and decision-making at the level they choose  - Honor patient and family perspectives and choices  Outcome: Progressing     Problem: Diabetes/Glucose Control  Goal: Glucose maintained within prescribed range  Description: INTERVENTIONS:  - Monitor Blood Glucose as ordered  - Assess for signs and symptoms of hyperglycemia and hypoglycemia  - Administer ordered medications to maintain glucose within target range  - Assess barriers to adequate nutritional intake and initiate nutrition consult as needed  - Instruct patient on self management of diabetes  Outcome: Progressing     Problem: CARDIOVASCULAR - ADULT  Goal: Maintains optimal cardiac output and hemodynamic stability  Description: INTERVENTIONS:  - Monitor vital signs, rhythm, and trends  - Monitor for bleeding, hypotension and signs of decreased cardiac output  -  Evaluate effectiveness of vasoactive medications to optimize hemodynamic stability  - Monitor arterial and/or venous puncture sites for bleeding and/or hematoma  - Assess quality of pulses, skin color and temperature  - Assess for signs of decreased coronary artery perfusion - ex. Angina  - Evaluate fluid balance, assess for edema, trend weights  Outcome: Progressing  Goal: Absence of cardiac arrhythmias or at baseline  Description: INTERVENTIONS:  - Continuous cardiac monitoring, monitor vital signs, obtain 12 lead EKG if indicated  - Evaluate effectiveness of antiarrhythmic and heart rate control medications as ordered  - Initiate emergency measures for life threatening arrhythmias  - Monitor electrolytes and administer replacement therapy as ordered  Outcome: Progressing     Problem: SKIN/TISSUE INTEGRITY - ADULT  Goal: Skin integrity remains intact  Description: INTERVENTIONS  - Assess and document risk factors for pressure ulcer development  - Assess and document skin integrity  - Monitor for areas of redness and/or skin breakdown  - Initiate interventions, skin care algorithm/standards of care as needed  Outcome: Progressing     Problem: Impaired Swallowing  Goal: Minimize aspiration risk  Description: Interventions:  - Patient should be alert and upright for all feedings (90 degrees preferred)  - Offer food and liquids at a slow rate  - No straws  - Encourage small bites of food and small sips of liquid  - Offer pills one at a time, crush or deliver with applesauce as needed  - Discontinue feeding and notify MD (or speech pathologist) if coughing or persistent throat clearing or wet/gurgly vocal quality is noted  Outcome: Progressing     Problem: PAIN - ADULT  Goal: Verbalizes/displays adequate comfort level or patient's stated pain goal  Description: INTERVENTIONS:  - Encourage pt to monitor pain and request assistance  - Assess pain using appropriate pain scale  - Administer analgesics based on type and  severity of pain and evaluate response  - Implement non-pharmacological measures as appropriate and evaluate response  - Consider cultural and social influences on pain and pain management  - Manage/alleviate anxiety  - Utilize distraction and/or relaxation techniques  - Monitor for opioid side effects  - Notify MD/LIP if interventions unsuccessful or patient reports new pain  - Anticipate increased pain with activity and pre-medicate as appropriate  Outcome: Progressing     Problem: SAFETY ADULT - FALL  Goal: Free from fall injury  Description: INTERVENTIONS:  - Assess pt frequently for physical needs  - Identify cognitive and physical deficits and behaviors that affect risk of falls.  - [place] fall precautions as indicated by assessment.  - Educate pt/family on patient safety including physical limitations  - Instruct pt to call for assistance with activity based on assessment  - Modify environment to reduce risk of injury  - Provide assistive devices as appropriate  - Consider OT/PT consult to assist with strengthening/mobility  - Encourage toileting schedule  Outcome: Progressing     Problem: DISCHARGE PLANNING  Goal: Discharge to home or other facility with appropriate resources  Description: INTERVENTIONS:  - Identify barriers to discharge w/pt and caregiver  - Include patient/family/discharge partner in discharge planning  - Arrange for needed discharge resources and transportation as appropriate  - Identify discharge learning needs (meds, wound care, etc)  - Arrange for interpreters to assist at discharge as needed  - Consider post-discharge preferences of patient/family/discharge partner  - Complete POLST form as appropriate  - Assess patient's ability to be responsible for managing their own health  - Refer to Case Management Department for coordinating discharge planning if the patient needs post-hospital services based on physician/LIP order or complex needs related to functional status, cognitive  ability or social support system  Outcome: Progressing

## 2025-01-24 NOTE — TELEPHONE ENCOUNTER
Called and unable to reach the patient's daughter, Brea. Left a VM letting her know we see the patient is currently hospitalized and will cancel her appointments for follow up with Dr. Mohamud on Monday, 1/27. Told her once her mom has a discharge date, then to call our office back to reschedule her appointments. Provided the office phone number.

## 2025-01-25 ENCOUNTER — APPOINTMENT (OUTPATIENT)
Dept: CT IMAGING | Facility: HOSPITAL | Age: 85
End: 2025-01-25
Attending: Other
Payer: MEDICARE

## 2025-01-25 ENCOUNTER — APPOINTMENT (OUTPATIENT)
Dept: MRI IMAGING | Facility: HOSPITAL | Age: 85
End: 2025-01-25
Attending: Other
Payer: MEDICARE

## 2025-01-25 ENCOUNTER — APPOINTMENT (OUTPATIENT)
Dept: GENERAL RADIOLOGY | Facility: HOSPITAL | Age: 85
End: 2025-01-25
Attending: HOSPITALIST
Payer: MEDICARE

## 2025-01-25 PROBLEM — D62 ACUTE BLOOD LOSS ANEMIA: Status: ACTIVE | Noted: 2025-01-25

## 2025-01-25 PROBLEM — K55.21 AVM (ARTERIOVENOUS MALFORMATION) OF SMALL BOWEL, ACQUIRED WITH HEMORRHAGE: Status: ACTIVE | Noted: 2024-03-11

## 2025-01-25 PROBLEM — I63.10 CEREBROVASCULAR ACCIDENT (CVA) DUE TO EMBOLISM OF PRECEREBRAL ARTERY (HCC): Status: ACTIVE | Noted: 2025-01-25

## 2025-01-25 PROBLEM — K92.2 SMALL BOWEL BLEED NOT REQUIRING MORE THAN 4 UNITS OF BLOOD IN 24 HOURS, ICU, OR SURGERY: Status: ACTIVE | Noted: 2025-01-25

## 2025-01-25 LAB
ALBUMIN SERPL-MCNC: 3.6 G/DL (ref 3.2–4.8)
ALBUMIN/GLOB SERPL: 1.1 {RATIO} (ref 1–2)
ALP LIVER SERPL-CCNC: 74 U/L
ALT SERPL-CCNC: 9 U/L
ANION GAP SERPL CALC-SCNC: 10 MMOL/L (ref 0–18)
APTT PPP: 26.6 SECONDS (ref 23–36)
AST SERPL-CCNC: 18 U/L (ref ?–34)
BASOPHILS # BLD AUTO: 0.05 X10(3) UL (ref 0–0.2)
BASOPHILS # BLD AUTO: 0.06 X10(3) UL (ref 0–0.2)
BASOPHILS NFR BLD AUTO: 0.3 %
BASOPHILS NFR BLD AUTO: 0.4 %
BILIRUB SERPL-MCNC: 0.3 MG/DL (ref 0.2–1.1)
BUN BLD-MCNC: 16 MG/DL (ref 9–23)
BUN/CREAT SERPL: 15.5 (ref 10–20)
CALCIUM BLD-MCNC: 10.1 MG/DL (ref 8.7–10.4)
CHLORIDE SERPL-SCNC: 108 MMOL/L (ref 98–112)
CO2 SERPL-SCNC: 24 MMOL/L (ref 21–32)
CREAT BLD-MCNC: 1.03 MG/DL
D DIMER PPP FEU-MCNC: 2.77 UG/ML FEU (ref ?–0.85)
DEPRECATED RDW RBC AUTO: 48.2 FL (ref 35.1–46.3)
DEPRECATED RDW RBC AUTO: 48.9 FL (ref 35.1–46.3)
EGFRCR SERPLBLD CKD-EPI 2021: 53 ML/MIN/1.73M2 (ref 60–?)
EOSINOPHIL # BLD AUTO: 0.18 X10(3) UL (ref 0–0.7)
EOSINOPHIL # BLD AUTO: 0.42 X10(3) UL (ref 0–0.7)
EOSINOPHIL NFR BLD AUTO: 1.2 %
EOSINOPHIL NFR BLD AUTO: 2.8 %
ERYTHROCYTE [DISTWIDTH] IN BLOOD BY AUTOMATED COUNT: 16.6 % (ref 11–15)
ERYTHROCYTE [DISTWIDTH] IN BLOOD BY AUTOMATED COUNT: 16.8 % (ref 11–15)
FIBRINOGEN PPP-MCNC: 369 MG/DL (ref 200–480)
GLOBULIN PLAS-MCNC: 3.3 G/DL (ref 2–3.5)
GLUCOSE BLD-MCNC: 130 MG/DL (ref 70–99)
GLUCOSE BLDC GLUCOMTR-MCNC: 116 MG/DL (ref 70–99)
GLUCOSE BLDC GLUCOMTR-MCNC: 124 MG/DL (ref 70–99)
GLUCOSE BLDC GLUCOMTR-MCNC: 135 MG/DL (ref 70–99)
GLUCOSE BLDC GLUCOMTR-MCNC: 140 MG/DL (ref 70–99)
HCT VFR BLD AUTO: 23.7 %
HCT VFR BLD AUTO: 24.4 %
HCT VFR BLD AUTO: 24.9 %
HCT VFR BLD AUTO: 25.3 %
HGB BLD-MCNC: 7.4 G/DL
HGB BLD-MCNC: 7.7 G/DL
HGB BLD-MCNC: 7.9 G/DL
HGB BLD-MCNC: 8 G/DL
IMM GRANULOCYTES # BLD AUTO: 0.07 X10(3) UL (ref 0–1)
IMM GRANULOCYTES # BLD AUTO: 0.12 X10(3) UL (ref 0–1)
IMM GRANULOCYTES NFR BLD: 0.5 %
IMM GRANULOCYTES NFR BLD: 0.8 %
INR BLD: 1.04 (ref 0.8–1.2)
LYMPHOCYTES # BLD AUTO: 0.92 X10(3) UL (ref 1–4)
LYMPHOCYTES # BLD AUTO: 1.28 X10(3) UL (ref 1–4)
LYMPHOCYTES NFR BLD AUTO: 6.3 %
LYMPHOCYTES NFR BLD AUTO: 8.6 %
MAGNESIUM SERPL-MCNC: 2.1 MG/DL (ref 1.6–2.6)
MCH RBC QN AUTO: 25.2 PG (ref 26–34)
MCH RBC QN AUTO: 25.4 PG (ref 26–34)
MCHC RBC AUTO-ENTMCNC: 31.6 G/DL (ref 31–37)
MCHC RBC AUTO-ENTMCNC: 31.6 G/DL (ref 31–37)
MCV RBC AUTO: 79.6 FL
MCV RBC AUTO: 80.5 FL
MONOCYTES # BLD AUTO: 0.7 X10(3) UL (ref 0.1–1)
MONOCYTES # BLD AUTO: 1.35 X10(3) UL (ref 0.1–1)
MONOCYTES NFR BLD AUTO: 4.8 %
MONOCYTES NFR BLD AUTO: 9.1 %
NEUTROPHILS # BLD AUTO: 11.71 X10 (3) UL (ref 1.5–7.7)
NEUTROPHILS # BLD AUTO: 11.71 X10(3) UL (ref 1.5–7.7)
NEUTROPHILS # BLD AUTO: 12.54 X10 (3) UL (ref 1.5–7.7)
NEUTROPHILS # BLD AUTO: 12.54 X10(3) UL (ref 1.5–7.7)
NEUTROPHILS NFR BLD AUTO: 78.7 %
NEUTROPHILS NFR BLD AUTO: 86.5 %
OSMOLALITY SERPL CALC.SUM OF ELEC: 297 MOSM/KG (ref 275–295)
PLATELET # BLD AUTO: 336 10(3)UL (ref 150–450)
PLATELET # BLD AUTO: 375 10(3)UL (ref 150–450)
PLATELET # BLD AUTO: 375 10(3)UL (ref 150–450)
POTASSIUM SERPL-SCNC: 3.9 MMOL/L (ref 3.5–5.1)
POTASSIUM SERPL-SCNC: 3.9 MMOL/L (ref 3.5–5.1)
PROT SERPL-MCNC: 6.9 G/DL (ref 5.7–8.2)
PROTHROMBIN TIME: 14.3 SECONDS (ref 11.6–14.8)
RBC # BLD AUTO: 3.03 X10(6)UL
RBC # BLD AUTO: 3.18 X10(6)UL
SODIUM SERPL-SCNC: 142 MMOL/L (ref 136–145)
WBC # BLD AUTO: 14.5 X10(3) UL (ref 4–11)
WBC # BLD AUTO: 14.9 X10(3) UL (ref 4–11)

## 2025-01-25 PROCEDURE — 36556 INSERT NON-TUNNEL CV CATH: CPT | Performed by: HOSPITALIST

## 2025-01-25 PROCEDURE — 99291 CRITICAL CARE FIRST HOUR: CPT | Performed by: OTHER

## 2025-01-25 PROCEDURE — 71045 X-RAY EXAM CHEST 1 VIEW: CPT | Performed by: HOSPITALIST

## 2025-01-25 PROCEDURE — 99232 SBSQ HOSP IP/OBS MODERATE 35: CPT | Performed by: SURGERY

## 2025-01-25 PROCEDURE — 70450 CT HEAD/BRAIN W/O DYE: CPT | Performed by: OTHER

## 2025-01-25 PROCEDURE — 70551 MRI BRAIN STEM W/O DYE: CPT | Performed by: OTHER

## 2025-01-25 PROCEDURE — 99233 SBSQ HOSP IP/OBS HIGH 50: CPT | Performed by: INTERNAL MEDICINE

## 2025-01-25 PROCEDURE — 70498 CT ANGIOGRAPHY NECK: CPT | Performed by: OTHER

## 2025-01-25 PROCEDURE — 99232 SBSQ HOSP IP/OBS MODERATE 35: CPT | Performed by: INTERNAL MEDICINE

## 2025-01-25 PROCEDURE — 70496 CT ANGIOGRAPHY HEAD: CPT | Performed by: OTHER

## 2025-01-25 RX ORDER — MIDAZOLAM HYDROCHLORIDE 1 MG/ML
INJECTION INTRAMUSCULAR; INTRAVENOUS
Status: COMPLETED
Start: 2025-01-25 | End: 2025-01-25

## 2025-01-25 RX ORDER — OCTREOTIDE ACETATE 50 UG/ML
50 INJECTION, SOLUTION INTRAVENOUS; SUBCUTANEOUS EVERY 8 HOURS
Status: DISCONTINUED | OUTPATIENT
Start: 2025-01-25 | End: 2025-02-05

## 2025-01-25 RX ORDER — MIDAZOLAM HYDROCHLORIDE 1 MG/ML
2 INJECTION INTRAMUSCULAR; INTRAVENOUS ONCE
Status: DISCONTINUED | OUTPATIENT
Start: 2025-01-25 | End: 2025-01-31

## 2025-01-25 NOTE — SLP NOTE
Attempted to see patient for a swallow evaluation, however, pt is NPO for EGD. RN requested deferral for today. Will follow up when appropriate.    Grisel Castillo, MSCCCSQuincy Valley Medical Center  [phone]

## 2025-01-25 NOTE — PROGRESS NOTES
[facility]     Gastroenterology Progress Note    Chester Bautista Patient Status:  Inpatient    1940 MRN [id number]   Location [facility] 3W/SW Attending Richard Guerra MD   Hosp Day # 7 PCP Heike Sorto MD       Subjective:   GI called back after the patient had dark stools.     Objective:   Blood pressure 103/86, pulse 99, temperature 97.2 °F (36.2 °C), temperature source Temporal, resp. rate 17, height 5' 1\" (1.549 m), weight 135 lb 9.6 oz (61.5 kg), SpO2 100%. Body mass index is 25.62 kg/m².    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    The patient is admitted for possible embolic CVA and 2 acute lacunar infarctions of the L cerebellum.  Work-up revealed extensive irregular thrombus of the aortic arch and L subclavian stenosis.      She was started on Plavix then switched to Brilinta more recently.     She is well-known to Dr. Mikey Garcia and more recently Dr. Zoila Mcqueen of our group for known chronic occult GI bleeding due to GI tract AVM lesions as per multiple EGD push enteroscopy procedures and VCE PillCam study.   As per operative notes summarized in the orginal consultation and below, these recurring AVM lesions do not appear amenable to endoscopy and ablation treatments.  All of them also cannot be reached with an EGD, push enteroscopy, and colonoscopy.  Ms.  Michele started octreotide Sandostatin LAR monthly injections about 9 months ago which appeared to be helping keeping her stable from an occult GI blood loss standpoint.    GI was called overnight after pt developed dark stool and a drop in hemoglobin 1/24 to 6.4.  Earlier that day, she had a left subclavian artery angiogram and stenting. Heparin was given during the procedure. After a unit of PRBCs, her hgb is up to 8.0.      It is likely that she bled from her known small bowel and colon AVMs after the left subclavian artery angiogram and stenting the morning of 1/24/25 - the heparin likely caused the bleed.      The bleeding appears to have stopped at this time and she is hemodynamically stable.      She due for her monthly octreotide LAR.  Octreotide 50 mcg sub-q TID was initiated today to prevent further bleeding from her known, multiple AVMs.       Recommend continuing to monitor for overt GI bleeding and trending her CBC.  If any anti-platelet therapy is warranted, recommend restarting it now and monitoring her closely.  If she develops evidence of recurrent bleeding despite the octreotide, could consider endoscopic evaluation on this admission.      The case was also discussed with neurology - if anticoagulation is warranted, could consider trying heparin now that she has been restarted on her octreotide though she would still be at risk for recurrent GI bleeding. If she rebleeds in that setting, it is unlikely that repeat EGD/push enteroscopy/CLN would be beneficial since these have been done before several times and she has AVMs throughout her GI tract. At that point, could consider transfer to a tertiary care center to consider balloon enteroscopy and colonoscopy to ablate any AVMs noted so that she is able to tolerate anticoagulation.     Recommendations:  Start octreotide 50 mcg sub-q TID since she is due for her monthly outpatient LAR injection  OK to resume anti-platelet therapy if needed from a  vascular/cardiology/neuro standpoint. See discussion with neurology above re: anticoagulation.   Will continue to monitor her closely  If there is evidence of recurrent bleeding despite starting the octreotide, will consider endoscopic evaluation vs transfer to a tertiary care center for balloon enteroscopy and colonoscopy   Trend CBC q8 for now with goal hemoglobin per vascular surgery but at least 7-8  After discharge, continue close outpatient monitoring of her CBC.  May require weekly CBC and iron studies the next few months    Sarah Cherry MD  [facility] Gastroenterology          Results:     Lab Results   Component Value Date    WBC 14.9 (H) 01/25/2025    HGB 7.9 (L) 01/25/2025    HCT 24.9 (L) 01/25/2025    .0 01/25/2025    .0 01/25/2025    CREATSERUM 1.03 (H) 01/25/2025    BUN 16 01/25/2025     01/25/2025    K 3.9 01/25/2025    K 3.9 01/25/2025     01/25/2025    CO2 24.0 01/25/2025     (H) 01/25/2025    CA 10.1 01/25/2025    ALB 3.6 01/25/2025    ALKPHO 74 01/25/2025    BILT 0.3 01/25/2025    TP 6.9 01/25/2025    AST 18 01/25/2025    ALT 9 (L) 01/25/2025    PTT 26.6 01/25/2025    INR 1.04 01/25/2025    T4F 1.2 01/17/2025    TSH 0.418 (L) 01/17/2025    DDIMER 2.77 (H) 01/25/2025    ESRML >130 (H) 11/29/2024    CRP 3.00 (H) 11/29/2024    MG 2.1 01/25/2025    PHOS 4.0 01/22/2025    B12 814 11/01/2024       XR CHEST AP PORTABLE  (CPT=71045)    Result Date: 1/25/2025  CONCLUSION:   Left internal jugular central venous catheter tip over the right atrium.  Metallic stent over the upper mediastinum, new from prior.  Correlate with recent intervention.  Background of pulmonary fibrosis.  Small left pleural effusion with left basilar opacity which may represent subsegmental atelectasis and/or other superimposed process.  A preliminary report was issued by the [facility] Radiology teleradiology service. There are no clinically actionable discrepancies.    Dictated by (CST): Andrea  MD Gisselle on 1/25/2025 at 9:08 AM     Finalized by (CST): Gisselle Mendez MD on 1/25/2025 at 9:10 AM          CT ABDOMEN+PELVIS(CPT=74176)    Result Date: 1/24/2025  CONCLUSION:  1. Small amount of ill-defined hematoma in left inguinal femoral region extending into left hemipelvis with slight haziness to the fat in the iliac fossa.  No large hematoma. 2. Contrast excretion into normal renal collecting systems and contrast distended urinary bladder from the recent angiogram. 3. Emphysema and scarring in both lungs.    Dictated by (CST): Michael Gonzalez MD on 1/24/2025 at 11:42 PM     Finalized by (CST): Michael Gonzalez MD on 1/24/2025 at 11:57 PM          US VENOUS DOPPLER LEG BILAT - DIAG IMG (CPT=93970)    Result Date: 1/24/2025  CONCLUSION:  Negative for sonographic evidence of deep venous thrombosis in either lower extremity.    Dictated by (CST): Sharath Gruber MD on 1/24/2025 at 7:21 PM     Finalized by (CST): Sharath Gruber MD on 1/24/2025 at 7:22 PM          CT BRAIN OR HEAD (CPT=70450)    Result Date: 1/24/2025  CONCLUSION:  1. Subarachnoid hemorrhage in the left frontal high convexity in bilateral occipital lobes with mild cerebral edema. Continued surveillance is recommended.  2. Postoperative changes of frontal craniotomy and anterior parafalcine aneurysm clipping.  3. Senescent changes of parenchymal volume loss with sequela of chronic microvascular ischemic disease.  4. There is large vessel atherosclerosis involving the anterior and posterior intracranial circulations.  5. Lesser incidental findings as above.      Results of this examination were conveyed to the patient's physician, Dr. Ronald March, by Dr. Gruber at 1838 on 01/24/2025.   Dictated by (CST): Sharath Gruber MD on 1/24/2025 at 6:35 PM     Finalized by (CST): Sharath Gruber MD on 1/24/2025 at 6:41 PM          CT BRAIN OR HEAD (CPT=70450)    Result Date: 1/24/2025  CONCLUSION:  1. Retained contrast material throughout the large  intracranial vessels and cerebral sulci from same date cardiac angiogram.  Please note that this finding limits evaluation for detection of potential acute intracranial hemorrhage.  Aforementioned sulcal  enhancement most likely relates to a contrast staining.  However, if symptoms or strong clinical suspicion for acute intracranial hemorrhage persist, a short interval 6-12 hour follow-up head CT is recommended. 2. Subcentimeter enhancing foci in the left cerebellum (x2) likely relate to known enhancing subacute lacunar type infarcts.  Continued follow-up to ensure resolution of enhancement is advised. 3. Midline frontal craniotomy and anterior parafalcine aneurysm clipping. 4. Intracranial atherosclerosis. 5. Stable chronic moderate adenoid enlargement.  elm-remote  Dictated by (CST): Jose Enrique Valente MD on 1/24/2025 at 11:33 AM     Finalized by (CST): Jose Enrique Valente MD on 1/24/2025 at 11:40 AM                 Endoscopy history:  3/9/2024 VCE video capsule exam attempted but unsuccessful due to capsule not leaving the stomach     2/28/2024 EGD examination with push enteroscopy Dr. Zoila Mcqueen:  Three nonbleeding AVMs ablated in the stomach  Over 6-7 \"small to medium sized\" nonbleeding AVMs seen but not cauterized in the duodenum  Plan initiated for octreotide injections     2/27/2024 EGD examination with push enteroscopy Dr. Zoila Mcqueen:  Two small AVM lesions in the stomach, 1 actively bleeding, both cauterized  Solid food in the stomach limited the exam that day; additional AVMs were suspected     12/21/2023 EGD examination with push enteroscopy Dr. Mikey Garcia:  Four tiny \" pinpoint\" AVM lesions cauterized in the duodenum, one actively bleeding     12/11/2023 VCE video capsule examination Dr. Mikey Garcia:  Small bowel passage time 4 hours 28 minutes with adequate prep  Multiple small bowel AVM lesions noted with 1 area of slow active bleeding     6/15/2023 EGD/colonoscopy examination Dr. Jensen  Jose:  Indication: Severe anemia  4mm gastric AVM lesion, cauterized  Three 2mm duodenal AVM lesions, cauterized  2mm cecal AVM lesion, cauterized  Pancolonic diverticulosis  Normal terminal ileum     7/21/2022 EGD/colonoscopy examinations Dr. Mikey Garcia:  Indication: Severe anemia  Two 2mm duodenal AVM lesions, cauterized  4mm cecal AVM lesion, cauterized  1 small 4mm colon polyp  Pancolonic diverticulosis  Normal terminal ileum

## 2025-01-25 NOTE — PROGRESS NOTES
[facility]  part of [facility]    Progress Note    Chester Bautista Patient Status:  Inpatient    1940 MRN [id number]   Location [facility] 3W/SW Attending Richard Guerra MD   Hosp Day # 7 PCP Heike Sorto MD       Subjective:   Sitting up comfortably and in NAD.   Denied any active complaints at the time of interview.  S/p stent placement on 2025  No overnight events reported.    Objective:   Blood pressure 145/61, pulse 97, temperature 97.2 °F (36.2 °C), temperature source Temporal, resp. rate 19, height 5' 1\" (1.549 m), weight 135 lb 9.6 oz (61.5 kg), SpO2 99%.    General: AAF, NAD  Respiratory: Clear to auscultation bilaterally. No wheezes. No rhonchi.  Cardiovascular: RRR  Abdomen: Soft, nontender, nondistended.  Neurologic: No focal neurological deficits.   Musculoskeletal: Moves all extremities.  Extremities: No edema or cyanosis.    Current Inpatient Medications:     Current Facility-Administered Medications:     midazolam (Versed) 2 MG/2ML injection 2 mg, 2 mg, Intravenous, Once    octreoTIDe (SandoSTATIN) 50 mcg/mL injection 50 mcg, 50 mcg, Subcutaneous, Q8H    sodium chloride 0.9% infusion, , Intravenous, Continuous    HYDROmorphone (Dilaudid) 1 MG/ML injection 0.2 mg, 0.2 mg, Intravenous, Q4H PRN    pantoprazole (Protonix) 40 mg in sodium chloride 0.9% PF 10 mL IV push, 40 mg, Intravenous, Q12H    sodium chloride 0.9% infusion, , Intravenous, Continuous    rosuvastatin (Crestor) tab 10 mg, 10 mg, Oral, Nightly    sennosides (Senokot) tab 8.6 mg, 8.6 mg, Oral, BID    LORazepam (Ativan) tab 0.5 mg, 0.5 mg, Oral, PRN    [Held by provider] ticagrelor (Brilinta) tab 90 mg, 90 mg, Oral, Q12H    ceFEPIme (Maxipime) 1 g in sodium chloride 0.9% 100 mL IVPB-MBP, 1 g, Intravenous, Q24H    cetirizine (ZyrTEC) tab 5 mg, 5 mg, Oral, Daily    acetaminophen (Tylenol Extra Strength) tab 500 mg, 500 mg, Oral, Q6H PRN    docusate sodium (Colace) cap 100 mg, 100 mg,  Oral, BID    polyethylene glycol (PEG 3350) (Miralax) 17 g oral packet 17 g, 17 g, Oral, Daily PRN    magnesium hydroxide (Milk of Magnesia) 400 MG/5ML oral suspension 30 mL, 30 mL, Oral, Daily PRN    bisacodyl (Dulcolax) 10 MG rectal suppository 10 mg, 10 mg, Rectal, Daily PRN    fleet enema (Fleet) rectal enema 133 mL, 1 enema, Rectal, Once PRN    meclizine (Antivert) tab 12.5 mg, 12.5 mg, Oral, TID PRN    acetaminophen (Tylenol) tab 650 mg, 650 mg, Oral, Q4H PRN **OR** acetaminophen (Tylenol) rectal suppository 650 mg, 650 mg, Rectal, Q4H PRN    hydrALAzine (Apresoline) 20 mg/mL injection 10 mg, 10 mg, Intravenous, Q2H PRN    ondansetron (Zofran) 4 MG/2ML injection 4 mg, 4 mg, Intravenous, Q6H PRN    metoclopramide (Reglan) 5 mg/mL injection 5 mg, 5 mg, Intravenous, Q8H PRN    acetaminophen (Tylenol) tab 650 mg, 650 mg, Oral, Q6H PRN    ondansetron (Zofran) 4 MG/2ML injection 4 mg, 4 mg, Intravenous, Q6H PRN    albuterol (Ventolin) (2.5 MG/3ML) 0.083% nebulizer solution 2.5 mg, 2.5 mg, Nebulization, Q4H PRN    carvedilol (Coreg) tab 12.5 mg, 12.5 mg, Oral, BID with meals    cyclobenzaprine (Flexeril) tab 10 mg, 10 mg, Oral, Nightly PRN    fluticasone-salmeterol (Advair Diskus) 250-50 MCG/ACT inhaler 1 puff, 1 puff, Inhalation, 2 times per day    gabapentin (Neurontin) cap 100 mg, 100 mg, Oral, Nightly    montelukast (Singulair) tab 10 mg, 10 mg, Oral, Nightly    predniSONE (Deltasone) tab 5 mg, 5 mg, Oral, Daily    losartan (Cozaar) tab 50 mg, 50 mg, Oral, Daily    hydroxychloroquine (Plaquenil) tab 300 mg, 300 mg, Oral, Daily    oxyCODONE immediate release tab 5 mg, 5 mg, Oral, Q6H PRN    insulin aspart (NovoLOG) 100 Units/mL FlexPen 1-11 Units, 1-11 Units, Subcutaneous, TID CC    insulin degludec (Tresiba) 100 units/mL flextouch 10 Units, 10 Units, Subcutaneous, Nightly      Results:     Recent Labs   Lab 01/23/25  0702 01/24/25  1519 01/25/25  0332 01/25/25  0900   RBC 3.17* 2.78* 3.18*  --    HGB 7.7* 6.4*  8.0* 7.9*   HCT 26.2* 22.2* 25.3* 24.9*   MCV 82.6 79.9* 79.6*  --    MCH 24.3* 23.0* 25.2*  --    MCHC 29.4* 28.8* 31.6  --    RDW 17.8* 17.5* 16.6*  --    NEPRELIM 7.74* 10.12* 11.71*  --    WBC 12.1* 13.4* 14.9*  --    .0 353.0 375.0  375.0  --          Recent Labs   Lab 01/23/25  0702 01/24/25  1519 01/25/25  0652   * 261* 130*   BUN 19 23 16   CREATSERUM 1.24* 1.23* 1.03*   EGFRCR 43* 43* 53*   CA 10.3 9.7 10.1    141 142   K 4.0  4.0 3.5 3.9  3.9    105 108   CO2 23.0 27.0 24.0         Imaging:   XR CHEST AP PORTABLE  (CPT=71045)    Result Date: 1/25/2025  CONCLUSION:   Left internal jugular central venous catheter tip over the right atrium.  Metallic stent over the upper mediastinum, new from prior.  Correlate with recent intervention.  Background of pulmonary fibrosis.  Small left pleural effusion with left basilar opacity which may represent subsegmental atelectasis and/or other superimposed process.  A preliminary report was issued by the [facility] teleradiology service. There are no clinically actionable discrepancies.    Dictated by (CST): Gisselle Mendez MD on 1/25/2025 at 9:08 AM     Finalized by (CST): Gisselle Mendez MD on 1/25/2025 at 9:10 AM          CT ABDOMEN+PELVIS(CPT=74176)    Result Date: 1/24/2025  CONCLUSION:  1. Small amount of ill-defined hematoma in left inguinal femoral region extending into left hemipelvis with slight haziness to the fat in the iliac fossa.  No large hematoma. 2. Contrast excretion into normal renal collecting systems and contrast distended urinary bladder from the recent angiogram. 3. Emphysema and scarring in both lungs.    Dictated by (CST): Michael Gonzalez MD on 1/24/2025 at 11:42 PM     Finalized by (CST): Michael Gonzalez MD on 1/24/2025 at 11:57 PM          US VENOUS DOPPLER LEG BILAT - DIAG IMG (CPT=93970)    Result Date: 1/24/2025  CONCLUSION:  Negative for sonographic evidence of deep venous thrombosis in either lower  extremity.    Dictated by (CST): Sharath Gruber MD on 1/24/2025 at 7:21 PM     Finalized by (CST): Sharath Gruber MD on 1/24/2025 at 7:22 PM          CT BRAIN OR HEAD (CPT=70450)    Result Date: 1/24/2025  CONCLUSION:  1. Subarachnoid hemorrhage in the left frontal high convexity in bilateral occipital lobes with mild cerebral edema. Continued surveillance is recommended.  2. Postoperative changes of frontal craniotomy and anterior parafalcine aneurysm clipping.  3. Senescent changes of parenchymal volume loss with sequela of chronic microvascular ischemic disease.  4. There is large vessel atherosclerosis involving the anterior and posterior intracranial circulations.  5. Lesser incidental findings as above.      Results of this examination were conveyed to the patient's physician, Dr. Ronald March, by Dr. Gruber at 1838 on 01/24/2025.   Dictated by (CST): Sharath Gruber MD on 1/24/2025 at 6:35 PM     Finalized by (CST): Sharath Gruber MD on 1/24/2025 at 6:41 PM          CT BRAIN OR HEAD (CPT=70450)    Result Date: 1/24/2025  CONCLUSION:  1. Retained contrast material throughout the large intracranial vessels and cerebral sulci from same date cardiac angiogram.  Please note that this finding limits evaluation for detection of potential acute intracranial hemorrhage.  Aforementioned sulcal  enhancement most likely relates to a contrast staining.  However, if symptoms or strong clinical suspicion for acute intracranial hemorrhage persist, a short interval 6-12 hour follow-up head CT is recommended. 2. Subcentimeter enhancing foci in the left cerebellum (x2) likely relate to known enhancing subacute lacunar type infarcts.  Continued follow-up to ensure resolution of enhancement is advised. 3. Midline frontal craniotomy and anterior parafalcine aneurysm clipping. 4. Intracranial atherosclerosis. 5. Stable chronic moderate adenoid enlargement.  elm-remote  Dictated by (CST): Jose Enrique Valente MD on 1/24/2025 at 11:33 AM      Finalized by (CST): Jose Enrique Valente MD on 1/24/2025 at 11:40 AM                 Assessment and Plan:   #Left Cerebellar Infarcts  #SAH  - pt with new sudden onset neuro changes  - 2 small cerebellar infarcts seen on admission MRI  -Patient already took 3 baby aspirin's prior to coming to ED   - CTA negative for LVO, but showing small subsegmental Pes  - Stroke alert called 1/19 -> stroke CT non-acute, MRI showing small CVA in L thalamus   - f/u lipid panel, A1c, TSH, ECHO   - Neuro on consult  -Continue statin  -Continue Brillinta  -high risk morbidity/mortality given her history of GI bleed and now need for antiplatelet therapy and potential anticoagulation in the future. Family is aware  -Vascular surgery on consult  -s/p stent placement 10/24/2025  -CT head reviewed, repeat CT pending  -Appreciate further neuro and vascular surgery input  -PT/OT    #Dizziness  Improved   -Likely in setting of above  -Meclizine prn   -Neurology consulted   -continue brilinta and statin    #Anemia  -in the setting of PE and aortic arch thrombus, the decision was made to resume anticoagulation despite the patients history of bleeding AVMs  -Hb appears to be stable at this time 8.4 -> 7.8 -> 7.7  -Transfused 1U pRBC  -monitor H&H, transfuse as indicated  -GI on consult   Start octreotide TID   Resume antiplatelet therapy if needed by cardiology/vascular/neurology  Possible endoscopic eval vs transfer to a tertiary care center for balloon enteroscopy and colonoscopy   Close opt follow up     #PE   -Patient is hemodynamically stable, EKG NSR and troponin not elevated.  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 Already took 3 baby aspirins prior to admission  -Previously had IVC filter placed in March 2024 for PE at that time (removed June 2024).   -Needs eval for hypercoagulable state. Risk factors of recent car ride, overall sedentary state, breast cancer. Sees Dr. Mohamud  Hem in clinic for anemia, needs further follow up   -given current presentation the patient was restarted on AC, close Hb monitoring     #Descending aortic arch thrombus   -Admission CTA chest revealing mural thrombus throughout the thoracic and abdominal aorta, chronic per radiology  -Vascular surgery on consult  -planning for angiogram 01/24/2025  -cont brilinta and statin for now      #HTN  -Hold home antihypertensives to allow for permissive hypertension poststroke     #Klebsiella UTI  -nitrofurantoin initiated in ED, UCx reviewed - nitrofurantoin resistant  -started on IV Cefepime  -deescalate abx as indicated    #Constipation  -bowel regimen ordered     Chronic Medical Problems  HTN  HLD  DM  COPD/?sarcoidosis   Rheumatoid Arthritis  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

## 2025-01-25 NOTE — PROCEDURES
Called by RN to room 217    Patient presented with CVA, bilateral PE and likely GI bleed with poor IV access currently on multiple meds.  Urgent central line placement required.  Consents were obtained timeout was done left subclavian area was prepped, patient extremely agitated during procedure thrashing around requiring to be restrained by 2 people, cannulated first attempt, guidewire passed, dilated, triple-lumen catheter passed over guidewire, 3 ports flushed anchored to site Tegaderm applied.    Chest x-ray pending

## 2025-01-25 NOTE — PLAN OF CARE
Pt is alert but dis-oriented. CTA brain completed today. Decline in pts neuro status noted at 1600, dr loida moran, at bedside. Pt down for MRI. Family at bedside.   Problem: HEMATOLOGIC - ADULT  Goal: Free from bleeding injury  Description: (Example usage: patient with low platelets)  INTERVENTIONS:  - Avoid intramuscular injections, enemas and rectal medication administration  - Ensure safe mobilization of patient  - Hold pressure on venipuncture sites to achieve adequate hemostasis  - Assess for signs and symptoms of internal bleeding  - Monitor lab trends  - Patient is to report abnormal signs of bleeding to staff  - Avoid use of toothpicks and dental floss  - Use electric shaver for shaving  - Use soft bristle tooth brush  - Limit straining and forceful nose blowing  Outcome: Progressing     Problem: Patient Centered Care  Goal: Patient preferences are identified and integrated in the patient's plan of care  Description: Interventions:  - What would you like us to know as we care for you? From home with daughter   - Provide timely, complete, and accurate information to patient/family  - Incorporate patient and family knowledge, values, beliefs, and cultural backgrounds into the planning and delivery of care  - Encourage patient/family to participate in care and decision-making at the level they choose  - Honor patient and family perspectives and choices  Outcome: Progressing     Problem: Diabetes/Glucose Control  Goal: Glucose maintained within prescribed range  Description: INTERVENTIONS:  - Monitor Blood Glucose as ordered  - Assess for signs and symptoms of hyperglycemia and hypoglycemia  - Administer ordered medications to maintain glucose within target range  - Assess barriers to adequate nutritional intake and initiate nutrition consult as needed  - Instruct patient on self management of diabetes  Outcome: Progressing     Problem: Patient/Family Goals  Goal: Patient/Family Long Term Goal  Description:  Patient's Long Term Goal: discharge from hospital     Interventions:  - monitor vital signs   - monitor blood glucose levels  - monitor appropriate labs  - pain management   - administer medications per order  - follow MD orders  - diagnostics per order  - update and inform patient and family on plan of care  - discharge planning  - See additional Care Plan goals for specific interventions  Outcome: Progressing  Goal: Patient/Family Short Term Goal  Description: Patient's Short Term Goal: manage pain     Interventions:   - monitor vital signs   - monitor blood glucose levels  - monitor appropriate labs  - pain management   - administer medications per order  - follow MD orders  - diagnostics per order  - update and inform patient and family on plan of care  - See additional Care Plan goals for specific interventions  Outcome: Progressing     Problem: CARDIOVASCULAR - ADULT  Goal: Maintains optimal cardiac output and hemodynamic stability  Description: INTERVENTIONS:  - Monitor vital signs, rhythm, and trends  - Monitor for bleeding, hypotension and signs of decreased cardiac output  - Evaluate effectiveness of vasoactive medications to optimize hemodynamic stability  - Monitor arterial and/or venous puncture sites for bleeding and/or hematoma  - Assess quality of pulses, skin color and temperature  - Assess for signs of decreased coronary artery perfusion - ex. Angina  - Evaluate fluid balance, assess for edema, trend weights  Outcome: Progressing  Goal: Absence of cardiac arrhythmias or at baseline  Description: INTERVENTIONS:  - Continuous cardiac monitoring, monitor vital signs, obtain 12 lead EKG if indicated  - Evaluate effectiveness of antiarrhythmic and heart rate control medications as ordered  - Initiate emergency measures for life threatening arrhythmias  - Monitor electrolytes and administer replacement therapy as ordered  Outcome: Progressing     Problem: METABOLIC/FLUID AND ELECTROLYTES - ADULT  Goal:  Glucose maintained within prescribed range  Description: INTERVENTIONS:  - Monitor Blood Glucose as ordered  - Assess for signs and symptoms of hyperglycemia and hypoglycemia  - Administer ordered medications to maintain glucose within target range  - Assess barriers to adequate nutritional intake and initiate nutrition consult as needed  - Instruct patient on self management of diabetes  Outcome: Progressing  Goal: Electrolytes maintained within normal limits  Description: INTERVENTIONS:  - Monitor labs and rhythm and assess patient for signs and symptoms of electrolyte imbalances  - Administer electrolyte replacement as ordered  - Monitor response to electrolyte replacements, including rhythm and repeat lab results as appropriate  - Fluid restriction as ordered  - Instruct patient on fluid and nutrition restrictions as appropriate  Outcome: Progressing  Goal: Hemodynamic stability and optimal renal function maintained  Description: INTERVENTIONS:  - Monitor labs and assess for signs and symptoms of volume excess or deficit  - Monitor intake, output and patient weight  - Monitor urine specific gravity, serum osmolarity and serum sodium as indicated or ordered  - Monitor response to interventions for patient's volume status, including labs, urine output, blood pressure (other measures as available)  - Encourage oral intake as appropriate  - Instruct patient on fluid and nutrition restrictions as appropriate  Outcome: Progressing     Problem: NEUROLOGICAL - ADULT  Goal: Achieves stable or improved neurological status  Description: INTERVENTIONS  - Assess for and report changes in neurological status  - Initiate measures to prevent increased intracranial pressure  - Maintain blood pressure and fluid volume within ordered parameters to optimize cerebral perfusion and minimize risk of hemorrhage  - Monitor temperature, glucose, and sodium. Initiate appropriate interventions as ordered  Outcome: Progressing  Goal:  Achieves maximal functionality and self care  Description: INTERVENTIONS  - Monitor swallowing and airway patency with patient fatigue and changes in neurological status  - Encourage and assist patient to increase activity and self care with guidance from PT/OT  - Encourage visually impaired, hearing impaired and aphasic patients to use assistive/communication devices  Outcome: Progressing     Problem: SKIN/TISSUE INTEGRITY - ADULT  Goal: Skin integrity remains intact  Description: INTERVENTIONS  - Assess and document risk factors for pressure ulcer development  - Assess and document skin integrity  - Monitor for areas of redness and/or skin breakdown  - Initiate interventions, skin care algorithm/standards of care as needed  Outcome: Progressing     Problem: MUSCULOSKELETAL - ADULT  Goal: Return mobility to safest level of function  Description: INTERVENTIONS:  - Assess patient stability and activity tolerance for standing, transferring and ambulating w/ or w/o assistive devices  - Assist with transfers and ambulation using safe patient handling equipment as needed  - Ensure adequate protection for wounds/incisions during mobilization  - Obtain PT/OT consults as needed  - Advance activity as appropriate  - Communicate ordered activity level and limitations with patient/family  Outcome: Progressing     Problem: Impaired Swallowing  Goal: Minimize aspiration risk  Description: Interventions:  - Patient should be alert and upright for all feedings (90 degrees preferred)  - Offer food and liquids at a slow rate  - No straws  - Encourage small bites of food and small sips of liquid  - Offer pills one at a time, crush or deliver with applesauce as needed  - Discontinue feeding and notify MD (or speech pathologist) if coughing or persistent throat clearing or wet/gurgly vocal quality is noted  Outcome: Progressing     Problem: SAFETY ADULT - FALL  Goal: Free from fall injury  Description: INTERVENTIONS:  - Assess pt  frequently for physical needs  - Identify cognitive and physical deficits and behaviors that affect risk of falls.  - [place] fall precautions as indicated by assessment.  - Educate pt/family on patient safety including physical limitations  - Instruct pt to call for assistance with activity based on assessment  - Modify environment to reduce risk of injury  - Provide assistive devices as appropriate  - Consider OT/PT consult to assist with strengthening/mobility  - Encourage toileting schedule  Outcome: Progressing     Problem: DISCHARGE PLANNING  Goal: Discharge to home or other facility with appropriate resources  Description: INTERVENTIONS:  - Identify barriers to discharge w/pt and caregiver  - Include patient/family/discharge partner in discharge planning  - Arrange for needed discharge resources and transportation as appropriate  - Identify discharge learning needs (meds, wound care, etc)  - Arrange for interpreters to assist at discharge as needed  - Consider post-discharge preferences of patient/family/discharge partner  - Complete POLST form as appropriate  - Assess patient's ability to be responsible for managing their own health  - Refer to Case Management Department for coordinating discharge planning if the patient needs post-hospital services based on physician/LIP order or complex needs related to functional status, cognitive ability or social support system  Outcome: Progressing

## 2025-01-25 NOTE — PROGRESS NOTES
[facility]  [address], [address]  [facility] [address]  [phone]          INPATIENT STROKE NEUROLOGY   FOLLOW UP PROGRESS NOTE  [facility]  part of [facility]    Chester Bautista Patient Status:  Inpatient     1940 MRN [id number]    Location [facility] 2W/SW Attending Bonny Carranza MD    Hosp Day # 7 PCP Heike Sorto MD    Date of Admission:  2025  Date of Consult Follow Up:  2025       Assessment and Plan:   Chester Bautista is a 84 year old woman w/ a pmhx of 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    Per prior discussions with GI service patient is at high risk of recurrent bleeding due to her AVMs and the potential harms of anticoagulation would outweigh the potential benefits.  Therefore she was started on Plavix and then switched to Brilinta.    After her vertebral artery stenting the she complained of a headache with migrainous features.  She had a stat head CT which demonstrated contrast exacerbation versus new subarachnoid hemorrhage.  She had a repeat head CT 6 hours later which demonstrated continued improvement of her subarachnoid hemorrhage, and her repeat head CT at  6 AM on 1/25/2025 demonstrate near resolution of the contrast/subarachnoid blood.  Patient was maintained on her antiplatelets.  Repeat exam on the morning of 1/25/2025 demonstrated new global aphasia, and a questionable right visual field deficit.  She had a repeat stat head CT and CTA head and neck that did not demonstrate any interval any interval large vessel occlusion.  However, her repeat MRI of the brain demonstrates interval infarcts in her left parietal/occipital region, left posterior temporal lobe, small medial occipital lobe infarct,   L>R frontal lobe/motor cortex, and left frontal lobe. She has infarct in her anterior and posterior circulation.    I discussed the case with GI including potential harms and benefits of low intensity heparin drip.  Per our discussion, would consider anticoagulation if she had recurrent strokes.  This was confirmed on the MRI which did demonstrate new infarcts.  The earliest the patient could have an endoscopy tentatively would be on Monday, 1/27/2025.  I spoke with the patient's daughter and granddaughter at the bedside multiple times throughout the day to discuss the plan.  Explained that her prognosis was guarded given the potential risk for GI bleeding while on anticoagulation and the potential risk for recurrent strokes if she is off therapeutic anticoagulation.  I also explained that patient has new aphasia which may lead to significant and persistent disability.  Per the patient's RN her motor exam was worsening at approximately 4:30 PM on 1/25/2025.  This would fit with her new infarct involving her precentral gyrus/motor strip.    Tentatively will consider starting heparin drip on the morning of 1/26/2025 with a PTT goal of 60-80.  Will readdress with GI.    She is not a candidate for tPA because she is out of the time window and because of her history of recurrent GI bleeds and AVMs.  Not can for thrombectomy because no LVO.     Recurrent strokes due to chronic  descending aorta mural thrombus in the setting of  chronic anemia due to numerous GI AVMs  Differential Diagnosis for stroke/TIA mechanism:  Embolic due to mural thrombus in her aorta       Plan    Diagnostics:  Imaging: MRI brain WO, CT brain WO, and CTA head/neck  ; stat CTA head head and neck if there exam worsens again.  At this time no plan for repeat CT on the morning of 1/26/2025.  Cardiac imaging: Telemetry and TTE    Labs: Fasting lipid panel and HgbA1C - already ordered. Will have to trend hemoglobin once she is on a heparin drip.   Therapeutics:  Blood pressure goal: Permissive hypertension: Less than 180/110.  May lower blood pressure goal after starting on heparin drip.  Please avoid blood pressure variability. wide fluctuations in BP can lead to stroke expansion.    Do not recommend lowering the blood pressure more than 25% in a 24-hour period.  Please avoid the use of atenolol as this medication is known to cause blood pressure variability.  PRN hydralazine and labetalol for sustained pressures outside of goal  Neuro checks Q1.  Telemetry.NIH stroke scale per protocol.  Blood glucose goal: .  Accuchecks Q6 if patient has DM  closely monitor to prevent hypoglycemia in patients with AIS.  Antithrombotics: Brilinta 90 mg twice daily. Will consider heparin drip on on the morning of 1/26/25 given recent infarcts and propensity for GI bleed.   Lipid-lowering agents: Cholesterol Meds: rosuvastatin - 10 MG; rosuvastatin Tabs - 20 MG     Avoid hypotonic fluids as this can worsen cerebral edema.  Physical therapy, Occupational Therapy, speech therapy evaluations ordered.  maintain oxygen saturation >94%. No supplemental oxygen  in nonhypoxic patients with acute ischemic stroke (AIS).  Tx hyperthermia (temperature >38°C) and identify source  STAT head CT and page neurology if any change in neurological exam or focal deficits.    Long term secondary stroke prevention goals:  Home BP monitoring. Pt  instructed to check BP at home in the AM and PM, and bring values to future clinic visits. BP goal is for normotension, < 130/80.  HbA1c goal <7.0  LDL-C goal  <70 mg/dL.  Target total cholesterol < 200 mg/dL Target HDL >45 mg/dL for men, >55 mg/dL for women, Target triglycerides <150 mg/dL  Physical inactivity - target exercise at least 3 times per week of moderate intensity activity for 20 minutes.  Obesity - target ideal body weight and girth <35\" for women, <40\" for men  Smoking - Target smoking cessation           INTERVAL EVENTS  01/25/25: Neurological exam was worse this morning.  Patient was aphasic.  She could only say \"1, 2, and yes.\"  ?Left gaze preference but could look to the right when asked specifically.  Questionable incongruent right hemianopsia..  Followed some axial and appendicular commands.  Repeat CTA for aphasia.  Repeat MRI demonstrated new infarct.  New motor deficits are RN at approximately 4:30 PM.       SUBJECTIVE:     Updated family multiple times throughout the day.  Discussed plan and guarded prognosis given risk for GI bleed and disability associated with recurrent strokes.  Explained that she had likely had a new left MCA stroke given her new aphasia at that the symptoms would be disabling.  Discussed potential benefits and potential harms of low intensity heparin drip including the risk of GI bleed that could lead to death.  Also discussed risk of recurrent strokes and can lead to primary neurological disability and death.  Daughter understands the complexly of the situation.  I reviewed the imaging with the patient's daughter and granddaughter.  Daughter states that this is a \"Catch-22 position.\"    I reviewed the imaging with the patient and the family in the room.  All explanations were provided in layman's terms.  Explained the pathophysiology/mechanism of stroke.  Gave the definition of stroke.  Explained the difference between stroke and TIA.  Discussed mechanism of stroke.   Discussed the work-up that have been done thus far and plan.  Discussed recurrent stroke symptoms.  Discussed secondary stroke prevention.      Pertinent positive and negatives per HPI.  All others were reviewed and negative.       Objective   OBJECTIVE:   Last vitals and weight :  Blood pressure (!) 164/67, pulse 83, temperature 97.2 °F (36.2 °C), temperature source Temporal, resp. rate 22, height 61\", weight 135 lb 9.6 oz (61.5 kg), SpO2 94%.   Vitals:    01/25/25 1400 01/25/25 1500 01/25/25 1600 01/25/25 1800   BP: 129/69 (!) 163/71 156/65 (!) 164/67   BP Location: Left arm Left arm Left arm Left arm   Pulse: 89 88 90 83   Resp: 25 (!) 29 21 22   Temp:   97.2 °F (36.2 °C)    TempSrc:   Temporal    SpO2: 99% 90% 96% 94%   Weight:       Height:          Exam:  - General: appears older than stated age and no distress  - CV: Symmetric pulses.   Carotids:   - Pulmonary: no sign of respiratory distress.    Neurologic Exam  - Mental Status: Requires repeated stimuli to attend.  She will regard.  She follows some axial commands.  She can stick out her tongue.  She could look to her right.  She could keep her arms elevated this morning.  She could not name.  She could not repeat.  She perseverated.  She would only say \"1, 2,\" or\" yes\".  - Cranial Nerves: Questionable left gaze gaze preference.  She could look to the right when asked specifically.  Visual fields: Incongruent right hemianopsia.  Pupils are 4mm briskly constricting to 3mm and equally round and reactive to light  in a well lit room. EOMI. No nystagmus. No ptosis. V1-V3 intact B/L to light touch.No pathological facial asymmetry. No flattening of the nasolabial fold. .  Hearing grossly intact.  Tongue midline. No atrophy or fasiculations of the tongue noted. Palate and uvula elevate symmetrically.  Shoulder shrug symmetric.    - Motor:   diffusely Decreased bulk.              Pronator drift: No pronator drift .  Keep both arms elevated antigravity for 10  seconds.    Leg Drift: Able to keep both legs antigravity for 5 seconds         - Sensory:   Light touch: normal     - Cerebellum: No truncal ataxia. No titubations. No dysmetria     NIH Stroke Scale  Person Administering Scale: Joe Kang   1a  Level of consciousness: 0 = Alert keenly responsive    1b. LOC questions:  1 = Answers one question correctly    1c. LOC commands: 1 = Performs one task correctly     2.  Best Gaze: 1 = Partial Gaze Palsy; gaze is abnormal in 1 or both eyes, but forced deviation or total gaze paresis is not present.     3.  Visual: 1 = Partial hemianopia     4. Facial Palsy: 0 = Normal symmetrical movement     5a.  Motor left arm: 0 = No drift; limb holds 90º(or 45º) for full 10 seconds   5b.  Motor right arm: 0 = No drift; limb holds 90º(or 45º) for full 10 seconds   6a. motor left le = No drift; leg holds 30º for full 5 seconds     6b  Motor right le = No drift; leg holds 30º for full 5 seconds     7. Limb Ataxia: 0 = Absent     8.  Sensory: 0 = Normal, no sensory loss     9. Best Language:  3 = Mute; global aphasia; no usable speech; or auditory comprehension    10. Dysarthria: 0 = Normal articulation   11. Extinction and Inattention: 0 = No abnormality     Total:   7   Modified Wesley scale score 3.      Medications:   midazolam  2 mg Intravenous Once    octreoTIDe  50 mcg Subcutaneous Q8H    pantoprazole  40 mg Intravenous Q12H    rosuvastatin  10 mg Oral Nightly    sennosides  8.6 mg Oral BID    [Held by provider] ticagrelor  90 mg Oral Q12H    cefepime  1 g Intravenous Q24H    cetirizine  5 mg Oral Daily    docusate sodium  100 mg Oral BID    carvedilol  12.5 mg Oral BID with meals    fluticasone-salmeterol  1 puff Inhalation 2 times per day    gabapentin  100 mg Oral Nightly    montelukast  10 mg Oral Nightly    predniSONE  5 mg Oral Daily    losartan  50 mg Oral Daily    hydroxychloroquine  300 mg Oral Daily    insulin aspart  1-11 Units Subcutaneous TID CC    insulin  degludec  10 Units Subcutaneous Nightly       PRNS:   HYDROmorphone    LORazepam    acetaminophen    polyethylene glycol (PEG 3350)    magnesium hydroxide    bisacodyl    fleet enema    meclizine    acetaminophen **OR** acetaminophen    hydrALAzine    ondansetron    metoclopramide    acetaminophen    ondansetron    albuterol    cyclobenzaprine    oxyCODONE    Infusions:    sodium chloride 100 mL/hr at 01/24/25 2312    sodium chloride Stopped (01/23/25 1630)          Results:   Laboratory Data:  Lab Results   Component Value Date    WBC 14.5 (H) 01/25/2025    HGB 7.7 (L) 01/25/2025    HCT 24.4 (L) 01/25/2025    .0 01/25/2025    CREATSERUM 1.03 (H) 01/25/2025    BUN 16 01/25/2025     01/25/2025    K 3.9 01/25/2025    K 3.9 01/25/2025     01/25/2025    CO2 24.0 01/25/2025     (H) 01/25/2025    CA 10.1 01/25/2025    ALB 3.6 01/25/2025    ALKPHO 74 01/25/2025    TP 6.9 01/25/2025    AST 18 01/25/2025    ALT 9 (L) 01/25/2025    PTT 26.6 01/25/2025    INR 1.04 01/25/2025    PTP 14.3 01/25/2025    T4F 1.2 01/17/2025    TSH 0.418 (L) 01/17/2025    DDIMER 2.77 (H) 01/25/2025    ESRML >130 (H) 11/29/2024    CRP 3.00 (H) 11/29/2024    MG 2.1 01/25/2025    PHOS 4.0 01/22/2025    B12 814 11/01/2024     Recent Results (from the past 72 hours)   POCT Glucose    Collection Time: 01/22/25  9:54 PM   Result Value Ref Range    POC Glucose  144 (H) 70 - 99 mg/dL   Magnesium    Collection Time: 01/23/25  7:02 AM   Result Value Ref Range    Magnesium 1.5 (L) 1.6 - 2.6 mg/dL   Potassium    Collection Time: 01/23/25  7:02 AM   Result Value Ref Range    Potassium 4.0 3.5 - 5.1 mmol/L   Comp Metabolic Panel (14)    Collection Time: 01/23/25  7:02 AM   Result Value Ref Range    Glucose 193 (H) 70 - 99 mg/dL    Sodium 139 136 - 145 mmol/L    Potassium 4.0 3.5 - 5.1 mmol/L    Chloride 106 98 - 112 mmol/L    CO2 23.0 21.0 - 32.0 mmol/L    Anion Gap 10 0 - 18 mmol/L    BUN 19 9 - 23 mg/dL    Creatinine 1.24 (H) 0.55 -  1.02 mg/dL    BUN/CREA Ratio 15.3 10.0 - 20.0    Calcium, Total 10.3 8.7 - 10.4 mg/dL    Calculated Osmolality 296 (H) 275 - 295 mOsm/kg    eGFR-Cr 43 (L) >=60 mL/min/1.73m2    ALT <7 (L) 10 - 49 U/L    AST 18 <34 U/L    Alkaline Phosphatase 82 55 - 142 U/L    Bilirubin, Total 0.2 0.2 - 1.1 mg/dL    Total Protein 6.6 5.7 - 8.2 g/dL    Albumin 3.6 3.2 - 4.8 g/dL    Globulin  3.0 2.0 - 3.5 g/dL    A/G Ratio 1.2 1.0 - 2.0   CBC With Differential With Platelet    Collection Time: 01/23/25  7:02 AM   Result Value Ref Range    WBC 12.1 (H) 4.0 - 11.0 x10(3) uL    RBC 3.17 (L) 3.80 - 5.30 x10(6)uL    HGB 7.7 (L) 12.0 - 16.0 g/dL    HCT 26.2 (L) 35.0 - 48.0 %    MCV 82.6 80.0 - 100.0 fL    MCH 24.3 (L) 26.0 - 34.0 pg    MCHC 29.4 (L) 31.0 - 37.0 g/dL    RDW-SD 53.4 (H) 35.1 - 46.3 fL    RDW 17.8 (H) 11.0 - 15.0 %    .0 150.0 - 450.0 10(3)uL    Neutrophil Absolute Prelim 7.74 (H) 1.50 - 7.70 x10 (3) uL    Neutrophil Absolute 7.74 (H) 1.50 - 7.70 x10(3) uL    Lymphocyte Absolute 2.59 1.00 - 4.00 x10(3) uL    Monocyte Absolute 1.14 (H) 0.10 - 1.00 x10(3) uL    Eosinophil Absolute 0.44 0.00 - 0.70 x10(3) uL    Basophil Absolute 0.08 0.00 - 0.20 x10(3) uL    Immature Granulocyte Absolute 0.09 0.00 - 1.00 x10(3) uL    Neutrophil % 64.2 %    Lymphocyte % 21.4 %    Monocyte % 9.4 %    Eosinophil % 3.6 %    Basophil % 0.7 %    Immature Granulocyte % 0.7 %   POCT Glucose    Collection Time: 01/23/25  9:55 AM   Result Value Ref Range    POC Glucose  245 (H) 70 - 99 mg/dL   ABORH (Blood Type)    Collection Time: 01/23/25  1:18 PM   Result Value Ref Range    ABO BLOOD TYPE B     RH BLOOD TYPE Positive    Antibody Screen    Collection Time: 01/23/25  1:18 PM   Result Value Ref Range    Antibody Screen Negative    POCT Glucose    Collection Time: 01/23/25  1:28 PM   Result Value Ref Range    POC Glucose  177 (H) 70 - 99 mg/dL   POCT Glucose    Collection Time: 01/23/25  6:38 PM   Result Value Ref Range    POC Glucose  276 (H) 70 -  99 mg/dL   POCT Glucose    Collection Time: 01/23/25  9:10 PM   Result Value Ref Range    POC Glucose  200 (H) 70 - 99 mg/dL   POCT Glucose    Collection Time: 01/24/25  6:45 AM   Result Value Ref Range    POC Glucose  181 (H) 70 - 99 mg/dL   Istat activated clotting time    Collection Time: 01/24/25 10:17 AM   Result Value Ref Range    ISTAT Activated Clotting Time 210 (H) 125 - 137 Seconds   POCT Glucose    Collection Time: 01/24/25  1:19 PM   Result Value Ref Range    POC Glucose  309 (H) 70 - 99 mg/dL   Magnesium    Collection Time: 01/24/25  3:19 PM   Result Value Ref Range    Magnesium 1.5 (L) 1.6 - 2.6 mg/dL   Comp Metabolic Panel (14)    Collection Time: 01/24/25  3:19 PM   Result Value Ref Range    Glucose 261 (H) 70 - 99 mg/dL    Sodium 141 136 - 145 mmol/L    Potassium 3.5 3.5 - 5.1 mmol/L    Chloride 105 98 - 112 mmol/L    CO2 27.0 21.0 - 32.0 mmol/L    Anion Gap 9 0 - 18 mmol/L    BUN 23 9 - 23 mg/dL    Creatinine 1.23 (H) 0.55 - 1.02 mg/dL    BUN/CREA Ratio 18.7 10.0 - 20.0    Calcium, Total 9.7 8.7 - 10.4 mg/dL    Calculated Osmolality 305 (H) 275 - 295 mOsm/kg    eGFR-Cr 43 (L) >=60 mL/min/1.73m2    ALT <7 (L) 10 - 49 U/L    AST 18 <34 U/L    Alkaline Phosphatase 74 55 - 142 U/L    Bilirubin, Total 0.2 0.2 - 1.1 mg/dL    Total Protein 6.4 5.7 - 8.2 g/dL    Albumin 3.2 3.2 - 4.8 g/dL    Globulin  3.2 2.0 - 3.5 g/dL    A/G Ratio 1.0 1.0 - 2.0   CBC With Differential With Platelet    Collection Time: 01/24/25  3:19 PM   Result Value Ref Range    WBC 13.4 (H) 4.0 - 11.0 x10(3) uL    RBC 2.78 (L) 3.80 - 5.30 x10(6)uL    HGB 6.4 (LL) 12.0 - 16.0 g/dL    HCT 22.2 (L) 35.0 - 48.0 %    MCV 79.9 (L) 80.0 - 100.0 fL    MCH 23.0 (L) 26.0 - 34.0 pg    MCHC 28.8 (L) 31.0 - 37.0 g/dL    RDW-SD 51.1 (H) 35.1 - 46.3 fL    RDW 17.5 (H) 11.0 - 15.0 %    .0 150.0 - 450.0 10(3)uL    Neutrophil Absolute Prelim 10.12 (H) 1.50 - 7.70 x10 (3) uL    Neutrophil Absolute 10.12 (H) 1.50 - 7.70 x10(3) uL    Lymphocyte  Absolute 1.44 1.00 - 4.00 x10(3) uL    Monocyte Absolute 1.26 (H) 0.10 - 1.00 x10(3) uL    Eosinophil Absolute 0.39 0.00 - 0.70 x10(3) uL    Basophil Absolute 0.05 0.00 - 0.20 x10(3) uL    Immature Granulocyte Absolute 0.10 0.00 - 1.00 x10(3) uL    Neutrophil % 75.8 %    Lymphocyte % 10.8 %    Monocyte % 9.4 %    Eosinophil % 2.9 %    Basophil % 0.4 %    Immature Granulocyte % 0.7 %   POCT Glucose    Collection Time: 01/24/25  3:32 PM   Result Value Ref Range    POC Glucose  290 (H) 70 - 99 mg/dL   Prepare RBC Once    Collection Time: 01/24/25  5:00 PM   Result Value Ref Range    Blood Product [id number]     Unit Number [id number]     UNIT ABO B     UNIT RH POS     Product Status Issued     Expiration Date 520868426695     Blood Type Barcode 7300     Unit Volume 350 ml   POCT Glucose    Collection Time: 01/24/25  5:36 PM   Result Value Ref Range    POC Glucose  266 (H) 70 - 99 mg/dL   Prepare RBC Once    Collection Time: 01/24/25  7:33 PM   Result Value Ref Range    Blood Product [id number]     Unit Number [id number]     UNIT ABO B     UNIT RH POS     Crossmatch Compatible     Product Status Cross Matched     Expiration Date 837697429431     Blood Type Barcode 7300     Unit Volume 350 ml   Prepare fresh frozen plasma Once    Collection Time: 01/24/25  8:00 PM   Result Value Ref Range    Blood Product [id number]     Unit Number [id number]     UNIT ABO B     UNIT RH POS     Product Status Cross Matched     Expiration Date 035153555711     Blood Type Barcode 7300     Unit Volume 316 ml   Prepare platelets Once    Collection Time: 01/24/25  8:35 PM   Result Value Ref Range    Blood Product [id number]     Unit Number [id number]     UNIT ABO A     UNIT RH POS     Product Status Issued     Expiration Date 693021443662     Blood Type Barcode 6200     Unit Volume 279 ml   POCT Glucose    Collection Time: 01/24/25  9:09 PM   Result Value Ref Range    POC Glucose  172 (H) 70 - 99 mg/dL   CBC With Differential With  Platelet    Collection Time: 01/25/25  3:32 AM   Result Value Ref Range    WBC 14.9 (H) 4.0 - 11.0 x10(3) uL    RBC 3.18 (L) 3.80 - 5.30 x10(6)uL    HGB 8.0 (L) 12.0 - 16.0 g/dL    HCT 25.3 (L) 35.0 - 48.0 %    MCV 79.6 (L) 80.0 - 100.0 fL    MCH 25.2 (L) 26.0 - 34.0 pg    MCHC 31.6 31.0 - 37.0 g/dL    RDW-SD 48.2 (H) 35.1 - 46.3 fL    RDW 16.6 (H) 11.0 - 15.0 %    .0 150.0 - 450.0 10(3)uL    Neutrophil Absolute Prelim 11.71 (H) 1.50 - 7.70 x10 (3) uL    Neutrophil Absolute 11.71 (H) 1.50 - 7.70 x10(3) uL    Lymphocyte Absolute 1.28 1.00 - 4.00 x10(3) uL    Monocyte Absolute 1.35 (H) 0.10 - 1.00 x10(3) uL    Eosinophil Absolute 0.42 0.00 - 0.70 x10(3) uL    Basophil Absolute 0.05 0.00 - 0.20 x10(3) uL    Immature Granulocyte Absolute 0.07 0.00 - 1.00 x10(3) uL    Neutrophil % 78.7 %    Lymphocyte % 8.6 %    Monocyte % 9.1 %    Eosinophil % 2.8 %    Basophil % 0.3 %    Immature Granulocyte % 0.5 %   Prothrombin Time (PT)    Collection Time: 01/25/25  3:32 AM   Result Value Ref Range    PT 14.3 11.6 - 14.8 seconds    INR 1.04 0.80 - 1.20   Platelet Count    Collection Time: 01/25/25  3:32 AM   Result Value Ref Range    .0 150.0 - 450.0 10(3)uL   PTT, Activated    Collection Time: 01/25/25  3:32 AM   Result Value Ref Range    PTT 26.6 23.0 - 36.0 seconds   Fibrinogen Activity    Collection Time: 01/25/25  3:32 AM   Result Value Ref Range    Fibrinogen 369 200 - 480 mg/dL   D-Dimer    Collection Time: 01/25/25  3:32 AM   Result Value Ref Range    D-Dimer 2.77 (H) <0.85 ug/mL FEU   Comp Metabolic Panel (14)    Collection Time: 01/25/25  6:52 AM   Result Value Ref Range    Glucose 130 (H) 70 - 99 mg/dL    Sodium 142 136 - 145 mmol/L    Potassium 3.9 3.5 - 5.1 mmol/L    Chloride 108 98 - 112 mmol/L    CO2 24.0 21.0 - 32.0 mmol/L    Anion Gap 10 0 - 18 mmol/L    BUN 16 9 - 23 mg/dL    Creatinine 1.03 (H) 0.55 - 1.02 mg/dL    BUN/CREA Ratio 15.5 10.0 - 20.0    Calcium, Total 10.1 8.7 - 10.4 mg/dL    Calculated  Osmolality 297 (H) 275 - 295 mOsm/kg    eGFR-Cr 53 (L) >=60 mL/min/1.73m2    ALT 9 (L) 10 - 49 U/L    AST 18 <34 U/L    Alkaline Phosphatase 74 55 - 142 U/L    Bilirubin, Total 0.3 0.2 - 1.1 mg/dL    Total Protein 6.9 5.7 - 8.2 g/dL    Albumin 3.6 3.2 - 4.8 g/dL    Globulin  3.3 2.0 - 3.5 g/dL    A/G Ratio 1.1 1.0 - 2.0   Magnesium    Collection Time: 01/25/25  6:52 AM   Result Value Ref Range    Magnesium 2.1 1.6 - 2.6 mg/dL   Potassium    Collection Time: 01/25/25  6:52 AM   Result Value Ref Range    Potassium 3.9 3.5 - 5.1 mmol/L   POCT Glucose    Collection Time: 01/25/25  8:55 AM   Result Value Ref Range    POC Glucose  140 (H) 70 - 99 mg/dL   Hemoglobin & Hematocrit    Collection Time: 01/25/25  9:00 AM   Result Value Ref Range    HGB 7.9 (L) 12.0 - 16.0 g/dL    HCT 24.9 (L) 35.0 - 48.0 %   POCT Glucose    Collection Time: 01/25/25 12:08 PM   Result Value Ref Range    POC Glucose  135 (H) 70 - 99 mg/dL   CBC With Differential With Platelet    Collection Time: 01/25/25  4:04 PM   Result Value Ref Range    WBC 14.5 (H) 4.0 - 11.0 x10(3) uL    RBC 3.03 (L) 3.80 - 5.30 x10(6)uL    HGB 7.7 (L) 12.0 - 16.0 g/dL    HCT 24.4 (L) 35.0 - 48.0 %    MCV 80.5 80.0 - 100.0 fL    MCH 25.4 (L) 26.0 - 34.0 pg    MCHC 31.6 31.0 - 37.0 g/dL    RDW-SD 48.9 (H) 35.1 - 46.3 fL    RDW 16.8 (H) 11.0 - 15.0 %    .0 150.0 - 450.0 10(3)uL    Neutrophil Absolute Prelim 12.54 (H) 1.50 - 7.70 x10 (3) uL    Neutrophil Absolute 12.54 (H) 1.50 - 7.70 x10(3) uL    Lymphocyte Absolute 0.92 (L) 1.00 - 4.00 x10(3) uL    Monocyte Absolute 0.70 0.10 - 1.00 x10(3) uL    Eosinophil Absolute 0.18 0.00 - 0.70 x10(3) uL    Basophil Absolute 0.06 0.00 - 0.20 x10(3) uL    Immature Granulocyte Absolute 0.12 0.00 - 1.00 x10(3) uL    Neutrophil % 86.5 %    Lymphocyte % 6.3 %    Monocyte % 4.8 %    Eosinophil % 1.2 %    Basophil % 0.4 %    Immature Granulocyte % 0.8 %   POCT Glucose    Collection Time: 01/25/25  4:25 PM   Result Value Ref Range    POC  Glucose  116 (H) 70 - 99 mg/dL            Last A1c was done on 1/18/2025.       Test results/Imaging:   MRI BRAIN (CPT=70551)    Result Date: 1/25/2025  CONCLUSION:  1. Interval development of multiple acute infarcts with largest in the left parietal occipital region and left posterior temporal lobe..  Other smaller infarcts in the bilateral posterior frontal lobes, right paramedian posterior parietal lobe, right occipital lobe, left anterior frontal lobe. 2. Recent left thalamic lacunar infarct is unchanged.  3. One of recent cerebellar lacunar infarcts is much less conspicuous and others no longer restrict diffusion.  Other chronic cerebellar lacunar infarcts. 4. Post right frontal craniotomy for aneurysm clipping.    Dictated by (CST): Michael Gonzalez MD on 1/25/2025 at 6:22 PM     Finalized by (CST): Michael Gonzalez MD on 1/25/2025 at 6:31 PM          CTA BRAIN + CTA CAROTIDS (CPT=70496/32061)    Result Date: 1/25/2025  CONCLUSION:  1. No large vessel occlusion, hemodynamically significant stenosis, dissection, AVM or aneurysm. 2. Wall 3. Right frontal craniotomy for aneurysm clipping. 4. Patent proximal left subclavian artery stent. 5. Changes of chronic small vessel disease in both cerebral hemispheres. 6. Irregular atherosclerotic plaque in the thoracic aorta. 7. Advanced emphysema with pleural parenchymal scarring.     Dictated by (CST): Michael Gonzalez MD on 1/25/2025 at 1:31 PM     Finalized by (CST): Michael Gonzalez MD on 1/25/2025 at 1:49 PM          XR CHEST AP PORTABLE  (CPT=71045)    Result Date: 1/25/2025  CONCLUSION:   Left internal jugular central venous catheter tip over the right atrium.  Metallic stent over the upper mediastinum, new from prior.  Correlate with recent intervention.  Background of pulmonary fibrosis.  Small left pleural effusion with left basilar opacity which may represent subsegmental atelectasis and/or other superimposed process.  A preliminary report was issued by the [facility]  Radiology teleradiology service. There are no clinically actionable discrepancies.    Dictated by (CST): Gisselle Mendez MD on 1/25/2025 at 9:08 AM     Finalized by (CST): Gisselle Mendez MD on 1/25/2025 at 9:10 AM          CT ABDOMEN+PELVIS(CPT=74176)    Result Date: 1/24/2025  CONCLUSION:  1. Small amount of ill-defined hematoma in left inguinal femoral region extending into left hemipelvis with slight haziness to the fat in the iliac fossa.  No large hematoma. 2. Contrast excretion into normal renal collecting systems and contrast distended urinary bladder from the recent angiogram. 3. Emphysema and scarring in both lungs.    Dictated by (CST): Michael Gonzalez MD on 1/24/2025 at 11:42 PM     Finalized by (CST): Michael Gonzalez MD on 1/24/2025 at 11:57 PM          US VENOUS DOPPLER LEG BILAT - DIAG IMG (CPT=93970)    Result Date: 1/24/2025  CONCLUSION:  Negative for sonographic evidence of deep venous thrombosis in either lower extremity.    Dictated by (CST): Sharath Gruber MD on 1/24/2025 at 7:21 PM     Finalized by (CST): Sharath Gruber MD on 1/24/2025 at 7:22 PM          CT BRAIN OR HEAD (CPT=70450)    Result Date: 1/24/2025  CONCLUSION:  1. Subarachnoid hemorrhage in the left frontal high convexity in bilateral occipital lobes with mild cerebral edema. Continued surveillance is recommended.  2. Postoperative changes of frontal craniotomy and anterior parafalcine aneurysm clipping.  3. Senescent changes of parenchymal volume loss with sequela of chronic microvascular ischemic disease.  4. There is large vessel atherosclerosis involving the anterior and posterior intracranial circulations.  5. Lesser incidental findings as above.      Results of this examination were conveyed to the patient's physician, Dr. Ronald March, by Dr. Gruber at 1838 on 01/24/2025.   Dictated by (CST): Sharath Gruber MD on 1/24/2025 at 6:35 PM     Finalized by (CST): Sharath Gruber MD on 1/24/2025 at 6:41 PM          CT BRAIN OR HEAD  (CPT=70450)    Result Date: 1/24/2025  CONCLUSION:  1. Retained contrast material throughout the large intracranial vessels and cerebral sulci from same date cardiac angiogram.  Please note that this finding limits evaluation for detection of potential acute intracranial hemorrhage.  Aforementioned sulcal  enhancement most likely relates to a contrast staining.  However, if symptoms or strong clinical suspicion for acute intracranial hemorrhage persist, a short interval 6-12 hour follow-up head CT is recommended. 2. Subcentimeter enhancing foci in the left cerebellum (x2) likely relate to known enhancing subacute lacunar type infarcts.  Continued follow-up to ensure resolution of enhancement is advised. 3. Midline frontal craniotomy and anterior parafalcine aneurysm clipping. 4. Intracranial atherosclerosis. 5. Stable chronic moderate adenoid enlargement.  elm-remote  Dictated by (CST): Jose Enrique Valente MD on 1/24/2025 at 11:33 AM     Finalized by (CST): Jose Enrique Valente MD on 1/24/2025 at 11:40 AM               MRI BRAIN (CPT=70551)    Result Date: 1/25/2025  CONCLUSION:  1. Interval development of multiple acute infarcts with largest in the left parietal occipital region and left posterior temporal lobe..  Other smaller infarcts in the bilateral posterior frontal lobes, right paramedian posterior parietal lobe, right occipital lobe, left anterior frontal lobe. 2. Recent left thalamic lacunar infarct is unchanged.  3. One of recent cerebellar lacunar infarcts is much less conspicuous and others no longer restrict diffusion.  Other chronic cerebellar lacunar infarcts. 4. Post right frontal craniotomy for aneurysm clipping.    Dictated by (CST): Michael Gonzalez MD on 1/25/2025 at 6:22 PM     Finalized by (CST): Michael Gonzalez MD on 1/25/2025 at 6:31 PM          CTA BRAIN + CTA CAROTIDS (CPT=70496/89070)    Result Date: 1/25/2025  CONCLUSION:  1. No large vessel occlusion, hemodynamically significant stenosis,  dissection, AVM or aneurysm. 2. Wall 3. Right frontal craniotomy for aneurysm clipping. 4. Patent proximal left subclavian artery stent. 5. Changes of chronic small vessel disease in both cerebral hemispheres. 6. Irregular atherosclerotic plaque in the thoracic aorta. 7. Advanced emphysema with pleural parenchymal scarring.     Dictated by (CST): Michael Gonzalez MD on 1/25/2025 at 1:31 PM     Finalized by (CST): Michael Gonzalez MD on 1/25/2025 at 1:49 PM          CT BRAIN OR HEAD (CPT=70450)    Result Date: 1/24/2025  CONCLUSION:  1. Subarachnoid hemorrhage in the left frontal high convexity in bilateral occipital lobes with mild cerebral edema. Continued surveillance is recommended.  2. Postoperative changes of frontal craniotomy and anterior parafalcine aneurysm clipping.  3. Senescent changes of parenchymal volume loss with sequela of chronic microvascular ischemic disease.  4. There is large vessel atherosclerosis involving the anterior and posterior intracranial circulations.  5. Lesser incidental findings as above.      Results of this examination were conveyed to the patient's physician, Dr. Ronald March, by Dr. Gruber at 1838 on 01/24/2025.   Dictated by (CST): Sharath Gruber MD on 1/24/2025 at 6:35 PM     Finalized by (CST): Sharath Gruber MD on 1/24/2025 at 6:41 PM          CT BRAIN OR HEAD (CPT=70450)    Result Date: 1/24/2025  CONCLUSION:  1. Retained contrast material throughout the large intracranial vessels and cerebral sulci from same date cardiac angiogram.  Please note that this finding limits evaluation for detection of potential acute intracranial hemorrhage.  Aforementioned sulcal  enhancement most likely relates to a contrast staining.  However, if symptoms or strong clinical suspicion for acute intracranial hemorrhage persist, a short interval 6-12 hour follow-up head CT is recommended. 2. Subcentimeter enhancing foci in the left cerebellum (x2) likely relate to known enhancing subacute lacunar  type infarcts.  Continued follow-up to ensure resolution of enhancement is advised. 3. Midline frontal craniotomy and anterior parafalcine aneurysm clipping. 4. Intracranial atherosclerosis. 5. Stable chronic moderate adenoid enlargement.  elm-remote  Dictated by (CST): Jose Enrique Valente MD on 1/24/2025 at 11:33 AM     Finalized by (CST): Jose Enrique Valente MD on 1/24/2025 at 11:40 AM          MRI BRAIN (CPT=70551)    Result Date: 1/20/2025  CONCLUSION:  1. Interval development of a punctate lacunar infarct of the left thalamus, concerning for acute ischemia.  2. Subacute lacunar infarcts of the left cerebellar hemisphere.   3. Postoperative changes of frontal craniotomy and parafalcine aneurysm clipping.  4. Senescent changes of parenchymal volume loss with mild sequela of chronic microvascular ischemic disease.  5. Lesser incidental findings as above.      A preliminary report was issued by the [facility] teleradiology service. There are no major discrepancies.  elm-remote.   Dictated by (CST): Sharath Gruber MD on 1/20/2025 at 9:24 AM     Finalized by (CST): Sharath Gruber MD on 1/20/2025 at 9:31 AM          CT STROKE BRAIN (NO IV)(CPT=70450)    Result Date: 1/19/2025  CONCLUSION:  1. Small focus of hypoattenuation involving the left cerebellar hemisphere, which corresponds to the acute infarct demonstrated on the prior MRI brain dated 01/17/2025. 2. Otherwise, no transcortical area of hypoattenuation to suggest an acute infarct.  If there is clinical concern for an acute infarct, consider further evaluation with an MRI of the brain. 3. No acute intracranial hemorrhage, midline shift, mass effect, or hydrocephalus. 4. Redemonstrated postoperative changes from prior frontal craniotomy for aneurysm clipping along the course of the anterior cerebral arteries.  The associated metallic artifact from the aneurysm clips limits evaluation of the adjacent structures. 5. Lesser incidental findings described above.     Impression points 1-3 were communicated via telephone to Leonidas SOW at 9:39 a.m. on 01/19/2025 by Joe Deleon MD  Dictated by (CST): Joe Deleon MD on 1/19/2025 at 9:34 AM     Finalized by (CST): Joe Deleon MD on 1/19/2025 at 9:41 AM          CT STROKE CTA BRAIN/CTA NECK (W IV)(CPT=70496/13348)    Result Date: 1/17/2025  CONCLUSION:  1. Negative for hemodynamically significant stenosis or occlusion of the anterior or posterior circulations.   2. The internal carotid arteries demonstrate tortuosity proximally without evidence of hemodynamically stenosis or occlusion.  3. The vertebral arteries demonstrate contiguous patency bilaterally without evidence of flow-limiting stenosis.  4. 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 Thrombus extends into the proximal left subclavian artery origin, contributing to luminal narrowing.  5. Dilatation of the main pulmonary artery trunk may relate to underlying pulmonary hypertension.  6. 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   7. Lesser incidental findings as above.   Dictated by (CST): Sharath Gruber MD on 1/17/2025 at 7:27 PM     Finalized by (CST): Sharath Gruber MD on 1/17/2025 at 7:36 PM          CT STROKE BRAIN (NO IV)(CPT=70450)    Result Date: 1/17/2025  CONCLUSION:  1. No acute intracranial hemorrhage or evidence of extended large vessel infarction. If there is clinical concern for acute ischemia, follow-up MRI suggested.   2. Postoperative changes of frontal craniotomy with anterior parafalcine aneurysm clipping.  3. Senescent changes of parenchymal volume loss with sequela of chronic microvascular ischemic disease.  4. There is large vessel atherosclerosis of the anterior and posterior intracranial circulations.  5. Lesser incidental findings as above.    This report was called immediately at 1915 hours to Emergency Department Pod 3 and discussed with the patient's ER physician, Dr. Cottrell.    Dictated by  (CST): Sharath Gruber MD on 1/17/2025 at 7:11 PM     Finalized by (CST): Sharath Gruber MD on 1/17/2025 at 7:15 PM           Performed an independent visualization of head CT, CTA head and neck, MRI brain from 1/25/2025.  Also reviewed recent imaging throughout the week.  Reviewed multiple head CTs and CTAs as well as prior MRIs.  Imaging revealed: Agree with radiology read.    Education/Instructions given to: patient, daughter, granddaughter, daughter's   Barriers to Learning:None  Content: Refer to note above. Evaluation/Outcome: Verbalized understanding    Disclaimer:   This record was dictated using Dragon software. There may be errors due to voice recognition problems that were not realized and corrected during the completion of the note.      This document is not intended to support charting by exception.  Sections left blank in a completed note should be presumed not to have been done.      Upon my evaluation, this patient had a high probability of imminent or life-threatening deterioration due to critical findings of laboratory tests, critical findings of imaging, central nervous system failure, acute CVA/stroke , and bleeding diatheses, which required my direct attention, intervention, and personal management.    I have personally provided 60 minutes of critical care time, exclusive of time spent on separately billable procedures.  Time includes review of all pertinent laboratory/radiology results, discussion with consultants, and monitoring for potential decompensation.  Performed interventions included fluids and use of antithrombotics in the setting of multiple GI AVMs with risk of bleeding, discussion of goals of care including treatments that need to be monitored for toxicity including heparin drip with risk of bleeding from GI AVMs and recurrent embolic strokes due to aortic thrombus .        Thank you.  Joe Kang D.O.   Vascular & General Neurology    1/25/2025  7:10 PM

## 2025-01-25 NOTE — PROGRESS NOTES
Notified by RN that patient abdominal pain worsening with distention and femoral site pain    Ordered CT abd/pelvis stat  Endorsed to nocturnist to follow up and take over work up    Suspect either femoral artery hematoma or retroperitoneal bleed  Also repeat CTH shows SAH  Just finished 1 unit prbc  Will order another unit for hold  Serial HH  Check coags  1 unit ffp on hold as well  Will give 1 unit platelet stat, received brilinta dose in cath lab earlier today     Ivelisse Schumacher NP

## 2025-01-25 NOTE — PROGRESS NOTES
Pulmonary/ICU/Critical Care Progress Note        Reason for Consultation: GIB, SAH  Referring Physician: Dr. Carranza      Subjective:  Central line placed overnight d/t poor IV access  Repeat CT brain pending  Was c/o abd pain so CT abd/pelvis was done overnight showed small left inguinal femoral hematoma  Given 1 FFP and 1 PLT overnight and brilinta held  Repeat LE dopplers neg      From the initial consultation  Pt unable to provide much info this afternoon so some info obtained from her dtr at bedside. 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 Parker), breast CA, DM, HTN, PONV, PE s/p IVC filter that has been removed since, RA admitted with severe vertigo type symptoms on 1/17/25.   On admission,seen by neurology. MRI brain showed acute CVA of left cerebellum and recommended starting plavix. GI was consulted to approve initiation of plavix given hx of AVMs. Further workup with CT angio of the head and neck was notable for bilateral antegrade vertebral artery flow, however there was mild to moderate stenosis at the origin of the left vertebral artery and there was a thrombus in the aortic arch encroaching to the origin of the left subclavian artery. Vascular surgery was consulted. Her hospital course has been complicated with another stroke identified in the left thalamus on MRI on 1/19/2025.   Pt was subsequently started on brilinta and vascular surgery took the pt for a left upper extremity, aortic arch and cerebral angiogram. Pt underwent stent placement in left subclavian artery. However, the pt complained of headaches post intervention, a subsequent CT head revealed contrast extravasation versus SAH.   Pt is now transferred to ICU for q1 neurochecks  Of note, pt is also more anemic today with hg drop to 6.4. she reports her abdomen hurts since this am. She just had  a BM that was very dark concerning for GIB.      REVIEW OF SYSTEMS:  Positives and negatives as noted in HPI. All other review of systems otherwise are either limited (due to pt/family inability to provide) or negative.      PAST MEDICAL HISTORY:  Past Medical History:   Diagnosis Date    Anxiety state     Cancer (HCC)     breast cancer 2018    Cerebellar stroke (HCC) 1/17/2025    Chronic obstructive pulmonary disease, unspecified (HCC) 05/04/2023    Chronic obstructive pulmonary disease, unspecified (HCC) 05/04/2023    COPD (chronic obstructive pulmonary disease) (HCC)     Cystic thyroid nodule 12/11/2024    Diabetes (HCC)     Diabetes mellitus (HCC)     Essential hypertension     Exposure to medical diagnostic radiation     High blood pressure     High cholesterol     History of blood transfusion 07/2022    low hemoglobin    Osteoarthritis     PONV (postoperative nausea and vomiting)     Pulmonary embolism (HCC)     Pulmonary emphysema (HCC)     Rheumatoid arthritis (HCC)          PAST SURGICAL HISTORY:  Past Surgical History:   Procedure Laterality Date    Cataract extraction w/  intraocular lens implant Bilateral 2017    Dr in [facility]     Colonoscopy      Colonoscopy N/A 07/21/2022    Procedure: COLONOSCOPY;  Surgeon: Mikey Garcia MD;  Location: [facility] ENDOSCOPY    Colonoscopy N/A 06/15/2023    Procedure: COLONOSCOPY;  Surgeon: Mikey Garcia MD;  Location: [facility] ENDOSCOPY    Correct bunion,simple      right foot  1993    Egd  12/21/2023    Dr. Garcia; Duodenal AVM's x4 cauterized    Hysterectomy      1972, no abnormal Paps, due to heavy bleeding with fibroids.  Not sure if it had bilateral salpingo-oophorectomy.    Ir aneurysm repairm  2010    Computer assisted volumetric craniofomy with clipping of anterior cerebral artery.    Lumpectomy right      Radiation right      Rotator cuff repair      1993    Total knee replacement Right 2010    Total knee replacement Left 07/02/2015    Transoral incisionless  fundoplication - internal  2012 Fredy fundoplication at [facility] Dr. Fournier.    Fredy fundoplication at [facility] Dr. Fournier.    Trigger finger release      left hand      Yag capsulotomy - ou - both eyes Bilateral 2021    done in [place]         PAST SOCIAL HISTORY:  Social History     Socioeconomic History    Marital status:    Tobacco Use    Smoking status: Former     Current packs/day: 0.00     Types: Cigarettes     Quit date:      Years since quittin.0     Passive exposure: Never    Smokeless tobacco: Never    Tobacco comments:     [id number]   Vaping Use    Vaping status: Never Used   Substance and Sexual Activity    Alcohol use: Never    Drug use: Never         PAST FAMILY HISTORY:  Family History   Problem Relation Age of Onset    Heart Surgery Mother         Leaky valve at 39 y/o.     Prostate Cancer Brother     Cancer Brother     Diabetes Maternal Grandmother     Diabetes Paternal Aunt     Breast Cancer Self 78    Breast Cancer Niece         before 50    Macular degeneration Neg     Glaucoma Neg          ALLERGIES:  Allergies[1]      MEDS:  Home Medications:  Medications Taking[2]    Scheduled Medication:   midazolam  2 mg Intravenous Once    pantoprazole  40 mg Intravenous Q12H    rosuvastatin  10 mg Oral Nightly    sennosides  8.6 mg Oral BID    [Held by provider] ticagrelor  90 mg Oral Q12H    cefepime  1 g Intravenous Q24H    cetirizine  5 mg Oral Daily    docusate sodium  100 mg Oral BID    carvedilol  12.5 mg Oral BID with meals    fluticasone-salmeterol  1 puff Inhalation 2 times per day    gabapentin  100 mg Oral Nightly    montelukast  10 mg Oral Nightly    predniSONE  5 mg Oral Daily    losartan  50 mg Oral Daily    hydroxychloroquine  300 mg Oral Daily    insulin aspart  1-11 Units Subcutaneous TID CC    insulin degludec  10 Units Subcutaneous Nightly     Continuous Infusing Medication:   sodium chloride 100 mL/hr at 25 3760     sodium chloride Stopped (01/23/25 1630)     PRN Medications:    HYDROmorphone    LORazepam    acetaminophen    polyethylene glycol (PEG 3350)    magnesium hydroxide    bisacodyl    fleet enema    meclizine    acetaminophen **OR** acetaminophen    hydrALAzine    ondansetron    metoclopramide    acetaminophen    ondansetron    albuterol    cyclobenzaprine    oxyCODONE       PHYSICAL EXAM:  /85 (BP Location: Right leg)   Pulse 100   Temp 97.1 °F (36.2 °C) (Temporal)   Resp 14   Ht 5' 1\" (1.549 m)   Wt 135 lb 9.6 oz (61.5 kg)   SpO2 100%   BMI 25.62 kg/m²      CONSTITUTIONAL: confused, no apparent distress  HEENT: atraumatic normocephalic  MOUTH: mucous membranes are moist. No OP exudates  NECK/THROAT: no JVD. Trachea midline. No obvious thyromegaly  LUNG: clear upper b/l no wheezing, crackles. Chest symmetric with respiratory motion  HEART: regular rate and rhythm, no obvious murmers or gallops noted  ABD: soft but tender. + bowel sounds. No organomegaly noted  EXT: no clubbing, cyanosis, or edema noted. Pulses intact grossly. Left groin site clean, dry, no ecchymosis, soft  NEURO/MUSCULOSKELETAL: no obvious gross deficits  SKIN: warm, dry. No obvious lesions noted  LYMPH: no obvious LAD  Right port. Left subclavian CVC placed      IMAGES:   CT abd/pelvis 1/24/25  CONCLUSION:   1. Small amount of ill-defined hematoma in left inguinal femoral region extending into left hemipelvis with slight haziness to the fat in the iliac fossa.  No large hematoma.   2. Contrast excretion into normal renal collecting systems and contrast distended urinary bladder from the recent angiogram.   3. Emphysema and scarring in both lungs.     CT brain repeat on 1/24/25  CONCLUSION:   1. Subarachnoid hemorrhage in the left frontal high convexity in bilateral occipital lobes with mild cerebral edema. Continued surveillance is recommended.   2. Postoperative changes of frontal craniotomy and anterior parafalcine aneurysm clipping.    3. Senescent changes of parenchymal volume loss with sequela of chronic microvascular ischemic disease.   4. There is large vessel atherosclerosis involving the anterior and posterior intracranial circulations.   5. Lesser incidental findings as above.     LE dopplers 1/24/25  Negative for sonographic evidence of deep venous thrombosis in either lower extremity.     CT brain 1/24/25  CONCLUSION:   1. Retained contrast material throughout the large intracranial vessels and cerebral sulci from same date cardiac angiogram.  Please note that this finding limits evaluation for detection of potential acute intracranial hemorrhage.  Aforementioned sulcal    enhancement most likely relates to a contrast staining.  However, if symptoms or strong clinical suspicion for acute intracranial hemorrhage persist, a short interval 6-12 hour follow-up head CT is recommended.   2. Subcentimeter enhancing foci in the left cerebellum (x2) likely relate to known enhancing subacute lacunar type infarcts.  Continued follow-up to ensure resolution of enhancement is advised.   3. Midline frontal craniotomy and anterior parafalcine aneurysm clipping.   4. Intracranial atherosclerosis.   5. Stable chronic moderate adenoid enlargement.       MRI brain 1/19/25  CONCLUSION:   1. Interval development of a punctate lacunar infarct of the left thalamus, concerning for acute ischemia.   2. Subacute lacunar infarcts of the left cerebellar hemisphere.    3. Postoperative changes of frontal craniotomy and parafalcine aneurysm clipping.   4. Senescent changes of parenchymal volume loss with mild sequela of chronic microvascular ischemic disease.   5. Lesser incidental findings as above.     CT brain 1/19/25  1. Small focus of hypoattenuation involving the left cerebellar hemisphere, which corresponds to the acute infarct demonstrated on the prior MRI brain dated 01/17/2025.   2. Otherwise, no transcortical area of hypoattenuation to suggest an acute  infarct.  If there is clinical concern for an acute infarct, consider further evaluation with an MRI of the brain.   3. No acute intracranial hemorrhage, midline shift, mass effect, or hydrocephalus.   4. Redemonstrated postoperative changes from prior frontal craniotomy for aneurysm clipping along the course of the anterior cerebral arteries.  The associated metallic artifact from the aneurysm clips limits evaluation of the adjacent structures.   5. Lesser incidental findings described above.       LE dopplers 1/18/25  No DVT in either lower extremity.     MRI brain 1/18/25  CONCLUSION:   Limited 3-sequence stroke protocol study was performed. Within these parameters:   1. Acute to early subacute lacunar infarctions in the left cerebellar hemisphere.   2. Stable postoperative changes from a remote frontal craniotomy and anterior parafalcine aneurysm clipping.   3. Stable mild chronic microangiopathic ischemic changes.     CT C/A/P with contrast 1/17/25  1. Acute pulmonary emboli involving the bilateral lower lobe subsegmental pulmonary artery branches.   2. No evidence of aortic dissection.   3. Ascending thoracic aortic ectasia. Multifocal irregular partially ulcerated peripheral mural thrombus is observed throughout the thoracic and abdominal aorta.    4. Dilatation of the main pulmonary artery trunk may relate to underlying pulmonary hypertension.   5. Moderate to severe centrilobular/paraseptal emphysema with extensive postradiation fibrosis and chronic opacity of the right middle lobe.   6. Multifocal pleuroparenchymal scarring is apparent.   7. Proximal distal colonic diverticulosis without CT evidence of acute complication in the imaged volume.   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   9. Lesser incidental findings as above.     CTA brain 1/17/25  CONCLUSION:   1. Negative for hemodynamically significant stenosis or occlusion of the anterior or posterior circulations.    2. The internal  carotid arteries demonstrate tortuosity proximally without evidence of hemodynamically stenosis or occlusion.   3. The vertebral arteries demonstrate contiguous patency bilaterally without evidence of flow-limiting stenosis.   4. 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 Thrombus extends into the proximal left subclavian artery origin, contributing to luminal narrowing.   5. Dilatation of the main pulmonary artery trunk may relate to underlying pulmonary hypertension.   6. 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    7. Lesser incidental findings as above.       LABS:  Recent Labs   Lab 01/23/25  0702 01/24/25  1519 01/25/25  0332   RBC 3.17* 2.78* 3.18*   HGB 7.7* 6.4* 8.0*   HCT 26.2* 22.2* 25.3*   MCV 82.6 79.9* 79.6*   MCH 24.3* 23.0* 25.2*   MCHC 29.4* 28.8* 31.6   RDW 17.8* 17.5* 16.6*   NEPRELIM 7.74* 10.12* 11.71*   WBC 12.1* 13.4* 14.9*   .0 353.0 375.0  375.0       Recent Labs   Lab 01/23/25  0702 01/24/25  1519 01/25/25  0652   * 261* 130*   BUN 19 23 16   CREATSERUM 1.24* 1.23* 1.03*   EGFRCR 43* 43* 53*   CA 10.3 9.7 10.1   ALB 3.6 3.2 3.6    141 142   K 4.0  4.0 3.5 3.9  3.9    105 108   CO2 23.0 27.0 24.0   ALKPHO 82 74 74   AST 18 18 18   ALT <7* <7* 9*   BILT 0.2 0.2 0.3   TP 6.6 6.4 6.9       ASSESSMENT/PLAN:  CVA x 2 and found to have left aortic arch and subclavian thrombus  -s/p stent placement on 1/24/25. Post op pt developed a severe HA   -imaging concerning for possible SAH  -NS and neurology following  -q1 neurochecks  --repeat CT head shows SAH   -SBP control/parameters < 140 as per NS and neurology recommendations  -was on brilinta. Previous to that was on plavix. Hold as per NS and neurology. Given 1 PLT and 1 FFP overnight since was on plavix and brilinta  -monitor in ICU    Acute b/l pulmonary emboli  -previous IVC filter removed  -LE dopplers neg on 1/18/25  -rechecked LE dopplers 1//24/25 remained neg    Left groin  hematoma  -site appears soft  -hold anticoagulation  -defer to vascular further intervention    Hx of AVMs s/p cauterization in the past and on octreotide outpt infusions  -hg downtrended on 1/24/25 and pt complaining of abd pain. Also had a dark BM  -transfused 1 unit pRBC. Repeat H/H improved  -was initially seen by GI, recommend reconsulting GI to come back and see the pt again  -PPI  -NPO    CPFE  -advair  -supp 02 as needed    Klebsiella UTI  -on cefepime     DM  -insulin and accuchecks    Proph:  -DVT: SCDs      Thank you for the opportunity to care for Chester Bautista.      CHAU Roque DO, MPH  Pulmonary Critical Care Medicine  [facility]                         [1]   Allergies  Allergen Reactions    Celebrex [Celecoxib] PALPITATIONS    Penicillins ITCHING and SWELLING    Amlodipine OTHER (SEE COMMENTS)     Calves Swelling     Metformin And Related UNKNOWN    Olmesartan UNKNOWN   [2]   Current Facility-Administered Medications for the 1/17/25 encounter (Hospital Encounter)   Medication Dose Route Frequency Provider Last Rate Last Admin    Certolizumab Pegol  mg  400 mg Subcutaneous Q14 Days sAhia Goodrich MD   400 mg at 01/09/25 1225    octreoTIDe (Sandostatin LAR) IM injection 20 mg  20 mg Intramuscular Q30 Days Zoila Mcqueen MD         Outpatient Medications Marked as Taking for the 1/17/25 encounter (Hospital Encounter)   Medication Sig Dispense Refill    acetaminophen 325 MG Oral Tab Take 2 tablets (650 mg total) by mouth every 8 (eight) hours.      rosuvastatin 20 MG Oral Tab Take 1 tablet (20 mg total) by mouth nightly. 90 tablet 3    magnesium oxide 400 MG Oral Tab Take 1 tablet (400 mg total) by mouth in the morning, at noon, and at bedtime. Per Dr. JENSEN Mata      valsartan 80 MG Oral Tab 1  1/2 tabs every 12 hrs. (Patient taking differently: Take 1 tablet (80 mg total) by mouth 2 (two) times daily. 1  1/2 tabs every 12 hrs.) 270 tablet 3     loratadine 10 MG Oral Tab TAKE 1 TABLET BY MOUTH EVERY DAY 30 tablet 1    Potassium Chloride ER 20 MEQ Oral Tab CR Take 40 mEq by mouth every morning. 60 tablet 1    hydroxychloroquine 200 MG Oral Tab Take 1.5 tablets (300 mg total) by mouth daily. 135 tablet 1    predniSONE 5 MG Oral Tab Take 1 tablet (5 mg total) by mouth daily. 90 tablet 1    famotidine 20 MG Oral Tab Take 1 tablet (20 mg total) by mouth 2 (two) times daily. Patient gets over the counter      glipiZIDE 5 MG Oral Tab Take 1 tablet (5 mg total) by mouth every morning before breakfast. 90 tablet 0    albuterol 108 (90 Base) MCG/ACT Inhalation Aero Soln TAKE 2 PUFFS BY MOUTH EVERY 4 TO 6 HOURS AS NEEDED 6.7 each 5    cyclobenzaprine 10 MG Oral Tab Take 1 tablet (10 mg total) by mouth nightly as needed. 90 tablet 1    insulin glargine (LANTUS SOLOSTAR) 100 UNIT/ML Subcutaneous Solution Pen-injector Inject 10 Units into the skin daily with dinner. (Patient taking differently: Inject 14 Units into the skin daily with dinner.) 3 mL 1    montelukast 10 MG Oral Tab Take 1 tablet (10 mg total) by mouth nightly. 90 tablet 3    carvedilol 12.5 MG Oral Tab Take 1 tablet (12.5 mg total) by mouth 2 (two) times daily with meals. 180 tablet 3    fluticasone-salmeterol (WIXELA INHUB) 250-50 MCG/ACT Inhalation Aerosol Powder, Breath Activated Inhale 1 puff into the lungs every 12 (twelve) hours. 1 each 5    gabapentin 100 MG Oral Cap Take 1 capsule (100 mg total) by mouth nightly. 90 capsule 3

## 2025-01-25 NOTE — PLAN OF CARE
Pt A&Ox0. Received I unit of PRBC and 1 unit of platelets. Repeat HGB was 8.0. CT abdomen showed small left femoral hematoma. Central line was placed for access xray confirmed by Dr. Smart.      Problem: HEMATOLOGIC - ADULT  Goal: Free from bleeding injury  Description: (Example usage: patient with low platelets)  INTERVENTIONS:  - Avoid intramuscular injections, enemas and rectal medication administration  - Ensure safe mobilization of patient  - Hold pressure on venipuncture sites to achieve adequate hemostasis  - Assess for signs and symptoms of internal bleeding  - Monitor lab trends  - Patient is to report abnormal signs of bleeding to staff  - Avoid use of toothpicks and dental floss  - Use electric shaver for shaving  - Use soft bristle tooth brush  - Limit straining and forceful nose blowing  Outcome: Progressing     Problem: Patient Centered Care  Goal: Patient preferences are identified and integrated in the patient's plan of care  Description: Interventions:  - What would you like us to know as we care for you? From home with daughter   - Provide timely, complete, and accurate information to patient/family  - Incorporate patient and family knowledge, values, beliefs, and cultural backgrounds into the planning and delivery of care  - Encourage patient/family to participate in care and decision-making at the level they choose  - Honor patient and family perspectives and choices  Outcome: Progressing     Problem: Diabetes/Glucose Control  Goal: Glucose maintained within prescribed range  Description: INTERVENTIONS:  - Monitor Blood Glucose as ordered  - Assess for signs and symptoms of hyperglycemia and hypoglycemia  - Administer ordered medications to maintain glucose within target range  - Assess barriers to adequate nutritional intake and initiate nutrition consult as needed  - Instruct patient on self management of diabetes  Outcome: Progressing     Problem: Patient/Family Goals  Goal: Patient/Family Long  Term Goal  Description: Patient's Long Term Goal: discharge from hospital     Interventions:  - monitor vital signs   - monitor blood glucose levels  - monitor appropriate labs  - pain management   - administer medications per order  - follow MD orders  - diagnostics per order  - update and inform patient and family on plan of care  - discharge planning  - See additional Care Plan goals for specific interventions  Outcome: Progressing  Goal: Patient/Family Short Term Goal  Description: Patient's Short Term Goal: manage pain     Interventions:   - monitor vital signs   - monitor blood glucose levels  - monitor appropriate labs  - pain management   - administer medications per order  - follow MD orders  - diagnostics per order  - update and inform patient and family on plan of care  - See additional Care Plan goals for specific interventions  Outcome: Progressing     Problem: CARDIOVASCULAR - ADULT  Goal: Maintains optimal cardiac output and hemodynamic stability  Description: INTERVENTIONS:  - Monitor vital signs, rhythm, and trends  - Monitor for bleeding, hypotension and signs of decreased cardiac output  - Evaluate effectiveness of vasoactive medications to optimize hemodynamic stability  - Monitor arterial and/or venous puncture sites for bleeding and/or hematoma  - Assess quality of pulses, skin color and temperature  - Assess for signs of decreased coronary artery perfusion - ex. Angina  - Evaluate fluid balance, assess for edema, trend weights  Outcome: Progressing  Goal: Absence of cardiac arrhythmias or at baseline  Description: INTERVENTIONS:  - Continuous cardiac monitoring, monitor vital signs, obtain 12 lead EKG if indicated  - Evaluate effectiveness of antiarrhythmic and heart rate control medications as ordered  - Initiate emergency measures for life threatening arrhythmias  - Monitor electrolytes and administer replacement therapy as ordered  Outcome: Progressing     Problem: METABOLIC/FLUID AND  ELECTROLYTES - ADULT  Goal: Glucose maintained within prescribed range  Description: INTERVENTIONS:  - Monitor Blood Glucose as ordered  - Assess for signs and symptoms of hyperglycemia and hypoglycemia  - Administer ordered medications to maintain glucose within target range  - Assess barriers to adequate nutritional intake and initiate nutrition consult as needed  - Instruct patient on self management of diabetes  Outcome: Progressing  Goal: Electrolytes maintained within normal limits  Description: INTERVENTIONS:  - Monitor labs and rhythm and assess patient for signs and symptoms of electrolyte imbalances  - Administer electrolyte replacement as ordered  - Monitor response to electrolyte replacements, including rhythm and repeat lab results as appropriate  - Fluid restriction as ordered  - Instruct patient on fluid and nutrition restrictions as appropriate  Outcome: Progressing  Goal: Hemodynamic stability and optimal renal function maintained  Description: INTERVENTIONS:  - Monitor labs and assess for signs and symptoms of volume excess or deficit  - Monitor intake, output and patient weight  - Monitor urine specific gravity, serum osmolarity and serum sodium as indicated or ordered  - Monitor response to interventions for patient's volume status, including labs, urine output, blood pressure (other measures as available)  - Encourage oral intake as appropriate  - Instruct patient on fluid and nutrition restrictions as appropriate  Outcome: Progressing     Problem: PAIN - ADULT  Goal: Verbalizes/displays adequate comfort level or patient's stated pain goal  Description: INTERVENTIONS:  - Encourage pt to monitor pain and request assistance  - Assess pain using appropriate pain scale  - Administer analgesics based on type and severity of pain and evaluate response  - Implement non-pharmacological measures as appropriate and evaluate response  - Consider cultural and social influences on pain and pain management  -  Manage/alleviate anxiety  - Utilize distraction and/or relaxation techniques  - Monitor for opioid side effects  - Notify MD/LIP if interventions unsuccessful or patient reports new pain  - Anticipate increased pain with activity and pre-medicate as appropriate  Outcome: Progressing     Problem: SAFETY ADULT - FALL  Goal: Free from fall injury  Description: INTERVENTIONS:  - Assess pt frequently for physical needs  - Identify cognitive and physical deficits and behaviors that affect risk of falls.  - Reyno fall precautions as indicated by assessment.  - Educate pt/family on patient safety including physical limitations  - Instruct pt to call for assistance with activity based on assessment  - Modify environment to reduce risk of injury  - Provide assistive devices as appropriate  - Consider OT/PT consult to assist with strengthening/mobility  - Encourage toileting schedule  Outcome: Progressing     Problem: DISCHARGE PLANNING  Goal: Discharge to home or other facility with appropriate resources  Description: INTERVENTIONS:  - Identify barriers to discharge w/pt and caregiver  - Include patient/family/discharge partner in discharge planning  - Arrange for needed discharge resources and transportation as appropriate  - Identify discharge learning needs (meds, wound care, etc)  - Arrange for interpreters to assist at discharge as needed  - Consider post-discharge preferences of patient/family/discharge partner  - Complete POLST form as appropriate  - Assess patient's ability to be responsible for managing their own health  - Refer to Case Management Department for coordinating discharge planning if the patient needs post-hospital services based on physician/LIP order or complex needs related to functional status, cognitive ability or social support system  Outcome: Progressing     Problem: SKIN/TISSUE INTEGRITY - ADULT  Goal: Skin integrity remains intact  Description: INTERVENTIONS  - Assess and document risk  factors for pressure ulcer development  - Assess and document skin integrity  - Monitor for areas of redness and/or skin breakdown  - Initiate interventions, skin care algorithm/standards of care as needed  Outcome: Progressing     Problem: MUSCULOSKELETAL - ADULT  Goal: Return mobility to safest level of function  Description: INTERVENTIONS:  - Assess patient stability and activity tolerance for standing, transferring and ambulating w/ or w/o assistive devices  - Assist with transfers and ambulation using safe patient handling equipment as needed  - Ensure adequate protection for wounds/incisions during mobilization  - Obtain PT/OT consults as needed  - Advance activity as appropriate  - Communicate ordered activity level and limitations with patient/family  Outcome: Progressing     Problem: Impaired Swallowing  Goal: Minimize aspiration risk  Description: Interventions:  - Patient should be alert and upright for all feedings (90 degrees preferred)  - Offer food and liquids at a slow rate  - No straws  - Encourage small bites of food and small sips of liquid  - Offer pills one at a time, crush or deliver with applesauce as needed  - Discontinue feeding and notify MD (or speech pathologist) if coughing or persistent throat clearing or wet/gurgly vocal quality is noted  Outcome: Progressing     Problem: NEUROLOGICAL - ADULT  Goal: Achieves stable or improved neurological status  Description: INTERVENTIONS  - Assess for and report changes in neurological status  - Initiate measures to prevent increased intracranial pressure  - Maintain blood pressure and fluid volume within ordered parameters to optimize cerebral perfusion and minimize risk of hemorrhage  - Monitor temperature, glucose, and sodium. Initiate appropriate interventions as ordered  Outcome: Not Progressing  Goal: Achieves maximal functionality and self care  Description: INTERVENTIONS  - Monitor swallowing and airway patency with patient fatigue and  changes in neurological status  - Encourage and assist patient to increase activity and self care with guidance from PT/OT  - Encourage visually impaired, hearing impaired and aphasic patients to use assistive/communication devices  Outcome: Not Progressing

## 2025-01-26 PROBLEM — K92.1 MELENA: Status: ACTIVE | Noted: 2025-01-26

## 2025-01-26 LAB
ALBUMIN SERPL-MCNC: 3.4 G/DL (ref 3.2–4.8)
ALBUMIN/GLOB SERPL: 1.1 {RATIO} (ref 1–2)
ALP LIVER SERPL-CCNC: 71 U/L
ALT SERPL-CCNC: <7 U/L
ANION GAP SERPL CALC-SCNC: 10 MMOL/L (ref 0–18)
AST SERPL-CCNC: 23 U/L (ref ?–34)
BILIRUB SERPL-MCNC: 0.4 MG/DL (ref 0.2–1.1)
BLOOD TYPE BARCODE: 6200
BLOOD TYPE BARCODE: 7300
BUN BLD-MCNC: 11 MG/DL (ref 9–23)
BUN/CREAT SERPL: 12.5 (ref 10–20)
CALCIUM BLD-MCNC: 9.8 MG/DL (ref 8.7–10.4)
CHLORIDE SERPL-SCNC: 107 MMOL/L (ref 98–112)
CO2 SERPL-SCNC: 22 MMOL/L (ref 21–32)
CREAT BLD-MCNC: 0.88 MG/DL
EGFRCR SERPLBLD CKD-EPI 2021: 64 ML/MIN/1.73M2 (ref 60–?)
GLOBULIN PLAS-MCNC: 3.1 G/DL (ref 2–3.5)
GLUCOSE BLD-MCNC: 65 MG/DL (ref 70–99)
GLUCOSE BLDC GLUCOMTR-MCNC: 196 MG/DL (ref 70–99)
GLUCOSE BLDC GLUCOMTR-MCNC: 235 MG/DL (ref 70–99)
GLUCOSE BLDC GLUCOMTR-MCNC: 248 MG/DL (ref 70–99)
GLUCOSE BLDC GLUCOMTR-MCNC: 264 MG/DL (ref 70–99)
GLUCOSE BLDC GLUCOMTR-MCNC: 67 MG/DL (ref 70–99)
GLUCOSE BLDC GLUCOMTR-MCNC: 86 MG/DL (ref 70–99)
HCT VFR BLD AUTO: 24.5 %
HCT VFR BLD AUTO: 24.7 %
HGB BLD-MCNC: 7.6 G/DL
HGB BLD-MCNC: 8 G/DL
MAGNESIUM SERPL-MCNC: 1.6 MG/DL (ref 1.6–2.6)
OSMOLALITY SERPL CALC.SUM OF ELEC: 286 MOSM/KG (ref 275–295)
POTASSIUM SERPL-SCNC: 2.9 MMOL/L (ref 3.5–5.1)
POTASSIUM SERPL-SCNC: 3.8 MMOL/L (ref 3.5–5.1)
PROT SERPL-MCNC: 6.5 G/DL (ref 5.7–8.2)
SODIUM SERPL-SCNC: 139 MMOL/L (ref 136–145)
UNIT VOLUME: 279 ML
UNIT VOLUME: 350 ML

## 2025-01-26 PROCEDURE — 99291 CRITICAL CARE FIRST HOUR: CPT | Performed by: OTHER

## 2025-01-26 PROCEDURE — 99232 SBSQ HOSP IP/OBS MODERATE 35: CPT | Performed by: INTERNAL MEDICINE

## 2025-01-26 PROCEDURE — 99233 SBSQ HOSP IP/OBS HIGH 50: CPT | Performed by: INTERNAL MEDICINE

## 2025-01-26 PROCEDURE — 99222 1ST HOSP IP/OBS MODERATE 55: CPT | Performed by: STUDENT IN AN ORGANIZED HEALTH CARE EDUCATION/TRAINING PROGRAM

## 2025-01-26 RX ORDER — DEXTROSE MONOHYDRATE AND SODIUM CHLORIDE 5; .9 G/100ML; G/100ML
INJECTION, SOLUTION INTRAVENOUS CONTINUOUS
Status: DISCONTINUED | OUTPATIENT
Start: 2025-01-26 | End: 2025-01-27

## 2025-01-26 RX ORDER — NICOTINE POLACRILEX 4 MG
15 LOZENGE BUCCAL
Status: DISCONTINUED | OUTPATIENT
Start: 2025-01-26 | End: 2025-02-05

## 2025-01-26 RX ORDER — NICOTINE POLACRILEX 4 MG
30 LOZENGE BUCCAL
Status: DISCONTINUED | OUTPATIENT
Start: 2025-01-26 | End: 2025-02-05

## 2025-01-26 RX ORDER — DEXTROSE MONOHYDRATE 25 G/50ML
50 INJECTION, SOLUTION INTRAVENOUS
Status: DISCONTINUED | OUTPATIENT
Start: 2025-01-26 | End: 2025-02-05

## 2025-01-26 RX ORDER — LABETALOL HYDROCHLORIDE 5 MG/ML
10 INJECTION, SOLUTION INTRAVENOUS EVERY 4 HOURS PRN
Status: DISCONTINUED | OUTPATIENT
Start: 2025-01-26 | End: 2025-01-28

## 2025-01-26 RX ORDER — MAGNESIUM SULFATE HEPTAHYDRATE 40 MG/ML
2 INJECTION, SOLUTION INTRAVENOUS ONCE
Status: COMPLETED | OUTPATIENT
Start: 2025-01-26 | End: 2025-01-26

## 2025-01-26 RX ORDER — POTASSIUM CHLORIDE 14.9 MG/ML
20 INJECTION INTRAVENOUS ONCE
Status: COMPLETED | OUTPATIENT
Start: 2025-01-26 | End: 2025-01-26

## 2025-01-26 NOTE — PROGRESS NOTES
[facility]  [address], [address]  [facility] [address]  [phone]          INPATIENT STROKE NEUROLOGY   FOLLOW UP PROGRESS NOTE  [facility]  part of formerly [facility]    Chester Bautista Patient Status:  Inpatient     1940 MRN [id number]    Location [facility] 2W/SW Attending Bonny Carranza MD    Hosp Day # 8 PCP Heike Sorto MD    Date of Admission:  2025  Date of Consult Follow Up:  25         Assessment and Plan:   Chester Bautista is a 84 year old woman w/ a pmhx of 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    Per prior discussions with GI service patient is at high risk of recurrent bleeding due to her AVMs and the potential harms of anticoagulation would outweigh the potential benefits.  Therefore she was started on Plavix and then switched to Brilinta.    After her vertebral artery stenting the she complained of a headache with migrainous features.  She had a stat head CT which demonstrated contrast exacerbation versus new subarachnoid hemorrhage.  She had a repeat head CT 6 hours later which demonstrated continued improvement of her subarachnoid hemorrhage, and her repeat head CT  at 6 AM on 1/25/2025 demonstrate near resolution of the contrast/subarachnoid blood.  Patient was maintained on her antiplatelets.  Repeat exam on the morning of 1/25/2025 demonstrated new global aphasia, and a questionable right visual field deficit.  She had a repeat stat head CT and CTA head and neck that did not demonstrate any interval any interval large vessel occlusion.  However, her repeat MRI of the brain demonstrates interval infarcts in her left parietal/occipital region, left posterior temporal lobe, small medial occipital lobe infarct,   L>R frontal lobe/motor cortex, and left frontal lobe. She has infarct in her anterior and posterior circulation.    1/25/2025  I discussed the case with GI including potential harms and benefits of low intensity heparin drip.  Per our discussion, would consider anticoagulation if she had recurrent strokes.  This was confirmed on the MRI which did demonstrate new infarcts.  The earliest the patient could have an endoscopy tentatively would be on Monday, 1/27/2025.  I spoke with the patient's daughter and granddaughter at the bedside multiple times throughout the day to discuss the plan.  Explained that her prognosis was guarded given the potential risk for GI bleeding while on anticoagulation and the potential risk for recurrent strokes if she is off therapeutic anticoagulation.  I also explained that patient has new aphasia which may lead to significant and persistent disability.  Per the patient's RN her motor exam was worsening at approximately 4:30 PM on 1/25/2025.  This would fit with her new infarct involving her precentral gyrus/motor strip.    Early on the morning of 1/26/2025 exam continues to decline.  Reported worsening drift in both of her upper extremities, acute disorientation, and acute visual field deficits affecting affecting both of her eyes.  Suspected there was some component of delirium as well.  Patient was given Tylenol and Dilaudid for pain and  allowed to sleep for the next 2 hours.  Repeat exam later on the morning of 1/26/2025 was significantly improved.  She still has a profound right homonymous hemianopsia but her language has not been significantly affected and she does not have a significant drift in both of her arms or her left leg.  She may have a minimal drift in her right leg.    Restarted her Brilinta 90 mg twice daily on 1/26/2025.  Discussed with GI.  Will continue to trend her hemoglobin.  May consider tentatively starting therapeutic anticoagulation on 1/27/2025.    She is not a candidate for tPA because she is out of the time window and because of her history of recurrent GI bleeds and AVMs.  Not can for thrombectomy because no LVO.     Recurrent strokes due to chronic descending aorta mural thrombus in the setting of  chronic anemia due to numerous GI AVMs  Differential Diagnosis for stroke/TIA mechanism:  Embolic due to mural thrombus in her aorta       Plan    Diagnostics:  Imaging: MRI brain WO, CT brain WO, and CTA head/neck  ; stat CTA head head and neck if there exam worsens again.  At this time no plan for repeat CT   Cardiac imaging: Telemetry and TTE    Labs: Fasting lipid panel and HgbA1C - already ordered. Will have to trend hemoglobin   Therapeutics:  Blood pressure goal: Permissive hypertension: Less than 180/110.  May lower blood pressure goal after starting on heparin drip.  Please avoid blood pressure variability. wide fluctuations in BP can lead to stroke expansion.    Do not recommend lowering the blood pressure more than 25% in a 24-hour period.  Please avoid the use of atenolol as this medication is known to cause blood pressure variability.  PRN hydralazine and labetalol for sustained pressures outside of goal  Neuro checks Q2.  Telemetry.NIH stroke scale per protocol.  Blood glucose goal: .  Accuchecks Q6 if patient has DM  closely monitor to prevent hypoglycemia in patients with AIS.  Antithrombotics: Brilinta  90 mg twice daily.  Brilinta was on hold.  Resumed on 1/26/2025.  Will consider heparin drip on on the morning of 1/27/25 given recent infarcts and propensity for GI bleed.   Lipid-lowering agents: Cholesterol Meds: rosuvastatin - 10 MG; rosuvastatin Tabs - 20 MG     Avoid hypotonic fluids as this can worsen cerebral edema.  Physical therapy, Occupational Therapy, speech therapy evaluations ordered.  maintain oxygen saturation >94%. No supplemental oxygen  in nonhypoxic patients with acute ischemic stroke (AIS).  Tx hyperthermia (temperature >38°C) and identify source  STAT head CT and page neurology if any change in neurological exam or focal deficits.    Long term secondary stroke prevention goals:  Home BP monitoring. Pt instructed to check BP at home in the AM and PM, and bring values to future clinic visits. BP goal is for normotension, < 130/80.  HbA1c goal <7.0  LDL-C goal  <70 mg/dL.  Target total cholesterol < 200 mg/dL Target HDL >45 mg/dL for men, >55 mg/dL for women, Target triglycerides <150 mg/dL  Physical inactivity - target exercise at least 3 times per week of moderate intensity activity for 20 minutes.  Obesity - target ideal body weight and girth <35\" for women, <40\" for men  Smoking - Target smoking cessation    2. Left shoulder pain  Lidocaine patch         INTERVAL EVENTS  01/25/25: Neurological exam was worse this morning.  Patient was aphasic.  She could only say \"1, 2, and yes.\"  ?Left gaze preference but could look to the right when asked specifically.  Questionable incongruent right hemianopsia..  Followed some axial and appendicular commands.  Repeat CTA for aphasia.  Repeat MRI demonstrated new infarct.  New motor deficits noted by RN at approximately 4:30 PM.  01/26/25  1:40 AM -acutely agitated.  Reported acute plan.  Complaint of significant visual field deficits.  Noted to have worsening drift in right upper extremity.       SUBJECTIVE:   On repeat evaluation approximately 1 AM this  morning her exam had significantly improved compared to 1/25/2025.  She is in good spirits.  Explained to the patient that she has had more strokes due to the clots in her aorta.  Layman's terms were used.  Explained the potential harms of antiplatelet therapy and anticoagulation as well as potential benefits and secondary stroke prevention.  Also explained the risk of recurrent strokes.  Discussed plan with patient and family numbers at the bedside.  She is in good spirits.  Patient is able to tell jokes.  She also reports that she is having left shoulder pain.      Pertinent positive and negatives per HPI.  All others were reviewed and negative.       Objective   OBJECTIVE:   Last vitals and weight :  Blood pressure 150/75, pulse 92, temperature 97.9 °F (36.6 °C), temperature source Temporal, resp. rate 20, height 61\", weight 135 lb 9.6 oz (61.5 kg), SpO2 97%.   Vitals:    01/26/25 1630 01/26/25 1800 01/26/25 2000 01/26/25 2200   BP: 104/77 141/78 (!) 131/105 150/75   BP Location: Right leg Right leg Right leg Right leg   Pulse: 86 92 89 92   Resp: (!) 27 16 23 20   Temp:   97.9 °F (36.6 °C)    TempSrc:   Temporal    SpO2: 98% 96% 96% 97%   Weight:       Height:          Exam:  - General: appears older than stated age and no distress  - CV: Symmetric pulses.   Carotids:   - Pulmonary: no sign of respiratory distress.    Neurologic Exam  - Mental Status: She is awake and alert.  She is pleasant and cooperative.  She is able to tell jokes.  She has insight into her deficits.  She is able to name.  She is able to repeat.  She is oriented to self.  She knew her location, the month and the year.    - Cranial Nerves: Questionable left gaze gaze preference.  .  Visual fields: Incongruent right hemianopsia.  Pupils are 4mm briskly constricting to 3mm and equally round and reactive to light  in a well lit room. EOMI. No nystagmus. No ptosis. V1-V3 intact B/L to light touch.No pathological facial asymmetry. No flattening of  the nasolabial fold. .  Hearing grossly intact.  Tongue midline. No atrophy or fasiculations of the tongue noted. Palate and uvula elevate symmetrically.  Shoulder shrug symmetric.    - Motor:   diffusely Decreased bulk.              Pronator drift: No pronator drift .  Keep both arms elevated antigravity for 10 seconds.    Leg Drift: Able to keep left leg antigravity for 5 seconds.  Right leg drift.        - Sensory:   Light touch: normal     - Cerebellum: No truncal ataxia. No titubations. No dysmetria         Medications:   ticagrelor  90 mg Oral Q12H    lidocaine-menthol  1 patch Transdermal Daily    midazolam  2 mg Intravenous Once    octreoTIDe  50 mcg Subcutaneous Q8H    pantoprazole  40 mg Intravenous Q12H    rosuvastatin  10 mg Oral Nightly    sennosides  8.6 mg Oral BID    cefepime  1 g Intravenous Q24H    cetirizine  5 mg Oral Daily    docusate sodium  100 mg Oral BID    carvedilol  12.5 mg Oral BID with meals    fluticasone-salmeterol  1 puff Inhalation 2 times per day    gabapentin  100 mg Oral Nightly    montelukast  10 mg Oral Nightly    predniSONE  5 mg Oral Daily    losartan  50 mg Oral Daily    hydroxychloroquine  300 mg Oral Daily    insulin aspart  1-11 Units Subcutaneous TID CC    insulin degludec  10 Units Subcutaneous Nightly       PRNS:   glucose **OR** glucose **OR** glucose-vitamin C **OR** dextrose **OR** glucose **OR** glucose **OR** glucose-vitamin C    labetalol    HYDROmorphone    acetaminophen    polyethylene glycol (PEG 3350)    magnesium hydroxide    bisacodyl    fleet enema    meclizine    acetaminophen **OR** acetaminophen    hydrALAzine    ondansetron    metoclopramide    acetaminophen    ondansetron    albuterol    cyclobenzaprine    oxyCODONE    Infusions:    dextrose 5%-sodium chloride 0.9% 83 mL/hr at 01/26/25 0853    sodium chloride Stopped (01/23/25 1630)          Results:   Laboratory Data:  Lab Results   Component Value Date    WBC 14.5 (H) 01/25/2025    HGB 8.0 (L)  01/26/2025    HCT 24.7 (L) 01/26/2025    .0 01/25/2025    CREATSERUM 0.88 01/26/2025    BUN 11 01/26/2025     01/26/2025    K 3.8 01/26/2025     01/26/2025    CO2 22.0 01/26/2025    GLU 65 (L) 01/26/2025    CA 9.8 01/26/2025    ALB 3.4 01/26/2025    ALKPHO 71 01/26/2025    TP 6.5 01/26/2025    AST 23 01/26/2025    ALT <7 (L) 01/26/2025    PTT 26.6 01/25/2025    INR 1.04 01/25/2025    PTP 14.3 01/25/2025    T4F 1.2 01/17/2025    TSH 0.418 (L) 01/17/2025    DDIMER 2.77 (H) 01/25/2025    ESRML >130 (H) 11/29/2024    CRP 3.00 (H) 11/29/2024    MG 1.6 01/26/2025    PHOS 4.0 01/22/2025    B12 814 11/01/2024     Recent Results (from the past 72 hours)   POCT Glucose    Collection Time: 01/24/25  6:45 AM   Result Value Ref Range    POC Glucose  181 (H) 70 - 99 mg/dL   Istat activated clotting time    Collection Time: 01/24/25 10:17 AM   Result Value Ref Range    ISTAT Activated Clotting Time 210 (H) 125 - 137 Seconds   POCT Glucose    Collection Time: 01/24/25  1:19 PM   Result Value Ref Range    POC Glucose  309 (H) 70 - 99 mg/dL   Magnesium    Collection Time: 01/24/25  3:19 PM   Result Value Ref Range    Magnesium 1.5 (L) 1.6 - 2.6 mg/dL   Comp Metabolic Panel (14)    Collection Time: 01/24/25  3:19 PM   Result Value Ref Range    Glucose 261 (H) 70 - 99 mg/dL    Sodium 141 136 - 145 mmol/L    Potassium 3.5 3.5 - 5.1 mmol/L    Chloride 105 98 - 112 mmol/L    CO2 27.0 21.0 - 32.0 mmol/L    Anion Gap 9 0 - 18 mmol/L    BUN 23 9 - 23 mg/dL    Creatinine 1.23 (H) 0.55 - 1.02 mg/dL    BUN/CREA Ratio 18.7 10.0 - 20.0    Calcium, Total 9.7 8.7 - 10.4 mg/dL    Calculated Osmolality 305 (H) 275 - 295 mOsm/kg    eGFR-Cr 43 (L) >=60 mL/min/1.73m2    ALT <7 (L) 10 - 49 U/L    AST 18 <34 U/L    Alkaline Phosphatase 74 55 - 142 U/L    Bilirubin, Total 0.2 0.2 - 1.1 mg/dL    Total Protein 6.4 5.7 - 8.2 g/dL    Albumin 3.2 3.2 - 4.8 g/dL    Globulin  3.2 2.0 - 3.5 g/dL    A/G Ratio 1.0 1.0 - 2.0   CBC With Differential  With Platelet    Collection Time: 01/24/25  3:19 PM   Result Value Ref Range    WBC 13.4 (H) 4.0 - 11.0 x10(3) uL    RBC 2.78 (L) 3.80 - 5.30 x10(6)uL    HGB 6.4 (LL) 12.0 - 16.0 g/dL    HCT 22.2 (L) 35.0 - 48.0 %    MCV 79.9 (L) 80.0 - 100.0 fL    MCH 23.0 (L) 26.0 - 34.0 pg    MCHC 28.8 (L) 31.0 - 37.0 g/dL    RDW-SD 51.1 (H) 35.1 - 46.3 fL    RDW 17.5 (H) 11.0 - 15.0 %    .0 150.0 - 450.0 10(3)uL    Neutrophil Absolute Prelim 10.12 (H) 1.50 - 7.70 x10 (3) uL    Neutrophil Absolute 10.12 (H) 1.50 - 7.70 x10(3) uL    Lymphocyte Absolute 1.44 1.00 - 4.00 x10(3) uL    Monocyte Absolute 1.26 (H) 0.10 - 1.00 x10(3) uL    Eosinophil Absolute 0.39 0.00 - 0.70 x10(3) uL    Basophil Absolute 0.05 0.00 - 0.20 x10(3) uL    Immature Granulocyte Absolute 0.10 0.00 - 1.00 x10(3) uL    Neutrophil % 75.8 %    Lymphocyte % 10.8 %    Monocyte % 9.4 %    Eosinophil % 2.9 %    Basophil % 0.4 %    Immature Granulocyte % 0.7 %   POCT Glucose    Collection Time: 01/24/25  3:32 PM   Result Value Ref Range    POC Glucose  290 (H) 70 - 99 mg/dL   POCT Glucose    Collection Time: 01/24/25  5:36 PM   Result Value Ref Range    POC Glucose  266 (H) 70 - 99 mg/dL   Prepare RBC Once    Collection Time: 01/24/25  7:33 PM   Result Value Ref Range    Blood Product [id number]     Unit Number [id number]     UNIT ABO B     UNIT RH POS     Crossmatch Compatible     Product Status Cross Matched     Expiration Date [id number]     Blood Type Barcode 7300     Unit Volume 350 ml   Prepare fresh frozen plasma Once    Collection Time: 01/24/25  8:00 PM   Result Value Ref Range    Blood Product [id number]     Unit Number [id number]     UNIT ABO B     UNIT RH POS     Product Status Cross Matched     Expiration Date [id number]     Blood Type Barcode 7300     Unit Volume 316 ml   POCT Glucose    Collection Time: 01/24/25  9:09 PM   Result Value Ref Range    POC Glucose  172 (H) 70 - 99 mg/dL   CBC With Differential With Platelet    Collection  Time: 01/25/25  3:32 AM   Result Value Ref Range    WBC 14.9 (H) 4.0 - 11.0 x10(3) uL    RBC 3.18 (L) 3.80 - 5.30 x10(6)uL    HGB 8.0 (L) 12.0 - 16.0 g/dL    HCT 25.3 (L) 35.0 - 48.0 %    MCV 79.6 (L) 80.0 - 100.0 fL    MCH 25.2 (L) 26.0 - 34.0 pg    MCHC 31.6 31.0 - 37.0 g/dL    RDW-SD 48.2 (H) 35.1 - 46.3 fL    RDW 16.6 (H) 11.0 - 15.0 %    .0 150.0 - 450.0 10(3)uL    Neutrophil Absolute Prelim 11.71 (H) 1.50 - 7.70 x10 (3) uL    Neutrophil Absolute 11.71 (H) 1.50 - 7.70 x10(3) uL    Lymphocyte Absolute 1.28 1.00 - 4.00 x10(3) uL    Monocyte Absolute 1.35 (H) 0.10 - 1.00 x10(3) uL    Eosinophil Absolute 0.42 0.00 - 0.70 x10(3) uL    Basophil Absolute 0.05 0.00 - 0.20 x10(3) uL    Immature Granulocyte Absolute 0.07 0.00 - 1.00 x10(3) uL    Neutrophil % 78.7 %    Lymphocyte % 8.6 %    Monocyte % 9.1 %    Eosinophil % 2.8 %    Basophil % 0.3 %    Immature Granulocyte % 0.5 %   Prothrombin Time (PT)    Collection Time: 01/25/25  3:32 AM   Result Value Ref Range    PT 14.3 11.6 - 14.8 seconds    INR 1.04 0.80 - 1.20   Platelet Count    Collection Time: 01/25/25  3:32 AM   Result Value Ref Range    .0 150.0 - 450.0 10(3)uL   PTT, Activated    Collection Time: 01/25/25  3:32 AM   Result Value Ref Range    PTT 26.6 23.0 - 36.0 seconds   Fibrinogen Activity    Collection Time: 01/25/25  3:32 AM   Result Value Ref Range    Fibrinogen 369 200 - 480 mg/dL   D-Dimer    Collection Time: 01/25/25  3:32 AM   Result Value Ref Range    D-Dimer 2.77 (H) <0.85 ug/mL FEU   Comp Metabolic Panel (14)    Collection Time: 01/25/25  6:52 AM   Result Value Ref Range    Glucose 130 (H) 70 - 99 mg/dL    Sodium 142 136 - 145 mmol/L    Potassium 3.9 3.5 - 5.1 mmol/L    Chloride 108 98 - 112 mmol/L    CO2 24.0 21.0 - 32.0 mmol/L    Anion Gap 10 0 - 18 mmol/L    BUN 16 9 - 23 mg/dL    Creatinine 1.03 (H) 0.55 - 1.02 mg/dL    BUN/CREA Ratio 15.5 10.0 - 20.0    Calcium, Total 10.1 8.7 - 10.4 mg/dL    Calculated Osmolality 297 (H) 275  - 295 mOsm/kg    eGFR-Cr 53 (L) >=60 mL/min/1.73m2    ALT 9 (L) 10 - 49 U/L    AST 18 <34 U/L    Alkaline Phosphatase 74 55 - 142 U/L    Bilirubin, Total 0.3 0.2 - 1.1 mg/dL    Total Protein 6.9 5.7 - 8.2 g/dL    Albumin 3.6 3.2 - 4.8 g/dL    Globulin  3.3 2.0 - 3.5 g/dL    A/G Ratio 1.1 1.0 - 2.0   Magnesium    Collection Time: 01/25/25  6:52 AM   Result Value Ref Range    Magnesium 2.1 1.6 - 2.6 mg/dL   Potassium    Collection Time: 01/25/25  6:52 AM   Result Value Ref Range    Potassium 3.9 3.5 - 5.1 mmol/L   POCT Glucose    Collection Time: 01/25/25  8:55 AM   Result Value Ref Range    POC Glucose  140 (H) 70 - 99 mg/dL   Hemoglobin & Hematocrit    Collection Time: 01/25/25  9:00 AM   Result Value Ref Range    HGB 7.9 (L) 12.0 - 16.0 g/dL    HCT 24.9 (L) 35.0 - 48.0 %   POCT Glucose    Collection Time: 01/25/25 12:08 PM   Result Value Ref Range    POC Glucose  135 (H) 70 - 99 mg/dL   CBC With Differential With Platelet    Collection Time: 01/25/25  4:04 PM   Result Value Ref Range    WBC 14.5 (H) 4.0 - 11.0 x10(3) uL    RBC 3.03 (L) 3.80 - 5.30 x10(6)uL    HGB 7.7 (L) 12.0 - 16.0 g/dL    HCT 24.4 (L) 35.0 - 48.0 %    MCV 80.5 80.0 - 100.0 fL    MCH 25.4 (L) 26.0 - 34.0 pg    MCHC 31.6 31.0 - 37.0 g/dL    RDW-SD 48.9 (H) 35.1 - 46.3 fL    RDW 16.8 (H) 11.0 - 15.0 %    .0 150.0 - 450.0 10(3)uL    Neutrophil Absolute Prelim 12.54 (H) 1.50 - 7.70 x10 (3) uL    Neutrophil Absolute 12.54 (H) 1.50 - 7.70 x10(3) uL    Lymphocyte Absolute 0.92 (L) 1.00 - 4.00 x10(3) uL    Monocyte Absolute 0.70 0.10 - 1.00 x10(3) uL    Eosinophil Absolute 0.18 0.00 - 0.70 x10(3) uL    Basophil Absolute 0.06 0.00 - 0.20 x10(3) uL    Immature Granulocyte Absolute 0.12 0.00 - 1.00 x10(3) uL    Neutrophil % 86.5 %    Lymphocyte % 6.3 %    Monocyte % 4.8 %    Eosinophil % 1.2 %    Basophil % 0.4 %    Immature Granulocyte % 0.8 %   POCT Glucose    Collection Time: 01/25/25  4:25 PM   Result Value Ref Range    POC Glucose  116 (H) 70 -  99 mg/dL   POCT Glucose    Collection Time: 01/25/25  9:20 PM   Result Value Ref Range    POC Glucose  124 (H) 70 - 99 mg/dL   Hemoglobin & Hematocrit    Collection Time: 01/25/25 11:57 PM   Result Value Ref Range    HGB 7.4 (L) 12.0 - 16.0 g/dL    HCT 23.7 (L) 35.0 - 48.0 %   Prepare RBC Once    Collection Time: 01/26/25 12:40 AM   Result Value Ref Range    Blood Product [id number]     Unit Number [id number]     UNIT ABO B     UNIT RH POS     Product Status Presumed Transfused     Expiration Date [id number]     Blood Type Barcode 7300     Unit Volume 350 ml   Prepare platelets Once    Collection Time: 01/26/25 12:40 AM   Result Value Ref Range    Blood Product [id number]     Unit Number [id number]     UNIT ABO A     UNIT RH POS     Product Status Presumed Transfused     Expiration Date [id number]     Blood Type Barcode 6200     Unit Volume 279 ml   Comp Metabolic Panel (14)    Collection Time: 01/26/25  5:18 AM   Result Value Ref Range    Glucose 65 (L) 70 - 99 mg/dL    Sodium 139 136 - 145 mmol/L    Potassium 2.9 (LL) 3.5 - 5.1 mmol/L    Chloride 107 98 - 112 mmol/L    CO2 22.0 21.0 - 32.0 mmol/L    Anion Gap 10 0 - 18 mmol/L    BUN 11 9 - 23 mg/dL    Creatinine 0.88 0.55 - 1.02 mg/dL    BUN/CREA Ratio 12.5 10.0 - 20.0    Calcium, Total 9.8 8.7 - 10.4 mg/dL    Calculated Osmolality 286 275 - 295 mOsm/kg    eGFR-Cr 64 >=60 mL/min/1.73m2    ALT <7 (L) 10 - 49 U/L    AST 23 <34 U/L    Alkaline Phosphatase 71 55 - 142 U/L    Bilirubin, Total 0.4 0.2 - 1.1 mg/dL    Total Protein 6.5 5.7 - 8.2 g/dL    Albumin 3.4 3.2 - 4.8 g/dL    Globulin  3.1 2.0 - 3.5 g/dL    A/G Ratio 1.1 1.0 - 2.0   Magnesium    Collection Time: 01/26/25  5:18 AM   Result Value Ref Range    Magnesium 1.6 1.6 - 2.6 mg/dL   POCT Glucose    Collection Time: 01/26/25  7:18 AM   Result Value Ref Range    POC Glucose  67 (L) 70 - 99 mg/dL   POCT Glucose    Collection Time: 01/26/25  7:42 AM   Result Value Ref Range    POC Glucose  86 70 - 99  mg/dL   POCT Glucose    Collection Time: 01/26/25  8:45 AM   Result Value Ref Range    POC Glucose  196 (H) 70 - 99 mg/dL   Hemoglobin & Hematocrit    Collection Time: 01/26/25  8:49 AM   Result Value Ref Range    HGB 7.6 (L) 12.0 - 16.0 g/dL    HCT 24.5 (L) 35.0 - 48.0 %   POCT Glucose    Collection Time: 01/26/25 10:08 AM   Result Value Ref Range    POC Glucose  235 (H) 70 - 99 mg/dL   POCT Glucose    Collection Time: 01/26/25 12:23 PM   Result Value Ref Range    POC Glucose  264 (H) 70 - 99 mg/dL   Hemoglobin & Hematocrit    Collection Time: 01/26/25  5:30 PM   Result Value Ref Range    HGB 8.0 (L) 12.0 - 16.0 g/dL    HCT 24.7 (L) 35.0 - 48.0 %   Potassium    Collection Time: 01/26/25  5:30 PM   Result Value Ref Range    Potassium 3.8 3.5 - 5.1 mmol/L   POCT Glucose    Collection Time: 01/26/25  8:56 PM   Result Value Ref Range    POC Glucose  248 (H) 70 - 99 mg/dL            Last A1c was done on 1/18/2025.       Test results/Imaging:   CT BRAIN OR HEAD (CPT=70450)    Result Date: 1/25/2025  CONCLUSION:  1. No intracranial hemorrhage. 2. Acute left parietal occipital cortical infarct and posterior temporal cortical infarct.  Other acute infarcts seen on the recent MRI are not detectable with certainty. 3. Right frontal craniotomy and para falcine aneurysm clipping.  New line large vessel intracranial atherosclerosis.    Dictated by (CST): Michael Gonzalez MD on 1/25/2025 at 7:43 PM     Finalized by (CST): Michael Gonzalez MD on 1/25/2025 at 7:48 PM          MRI BRAIN (CPT=70551)    Result Date: 1/25/2025  CONCLUSION:  1. Interval development of multiple acute infarcts with largest in the left parietal occipital region and left posterior temporal lobe..  Other smaller infarcts in the bilateral posterior frontal lobes, right paramedian posterior parietal lobe, right occipital lobe, left anterior frontal lobe. 2. Recent left thalamic lacunar infarct is unchanged.  3. One of recent cerebellar lacunar infarcts is much  less conspicuous and others no longer restrict diffusion.  Other chronic cerebellar lacunar infarcts. 4. Post right frontal craniotomy for aneurysm clipping.    Dictated by (CST): Michael Gonzalez MD on 1/25/2025 at 6:22 PM     Finalized by (CST): Michael Gonzalez MD on 1/25/2025 at 6:31 PM          CTA BRAIN + CTA CAROTIDS (CPT=70496/91024)    Result Date: 1/25/2025  CONCLUSION:  1. No large vessel occlusion, hemodynamically significant stenosis, dissection, AVM or aneurysm. 2. Wall 3. Right frontal craniotomy for aneurysm clipping. 4. Patent proximal left subclavian artery stent. 5. Changes of chronic small vessel disease in both cerebral hemispheres. 6. Irregular atherosclerotic plaque in the thoracic aorta. 7. Advanced emphysema with pleural parenchymal scarring.     Dictated by (CST): Michael Gonzalez MD on 1/25/2025 at 1:31 PM     Finalized by (CST): Michael Gonzalez MD on 1/25/2025 at 1:49 PM          XR CHEST AP PORTABLE  (CPT=71045)    Result Date: 1/25/2025  CONCLUSION:   Left internal jugular central venous catheter tip over the right atrium.  Metallic stent over the upper mediastinum, new from prior.  Correlate with recent intervention.  Background of pulmonary fibrosis.  Small left pleural effusion with left basilar opacity which may represent subsegmental atelectasis and/or other superimposed process.  A preliminary report was issued by the [facility] teleradiology service. There are no clinically actionable discrepancies.    Dictated by (CST): Gisselle Mendez MD on 1/25/2025 at 9:08 AM     Finalized by (CST): Gisselle Mendez MD on 1/25/2025 at 9:10 AM               CT BRAIN OR HEAD (CPT=70450)    Result Date: 1/25/2025  CONCLUSION:  1. No intracranial hemorrhage. 2. Acute left parietal occipital cortical infarct and posterior temporal cortical infarct.  Other acute infarcts seen on the recent MRI are not detectable with certainty. 3. Right frontal craniotomy and para falcine aneurysm clipping.  New  line large vessel intracranial atherosclerosis.    Dictated by (CST): Michael Gonzalez MD on 1/25/2025 at 7:43 PM     Finalized by (CST): Michael Gonzalez MD on 1/25/2025 at 7:48 PM          MRI BRAIN (CPT=70551)    Result Date: 1/25/2025  CONCLUSION:  1. Interval development of multiple acute infarcts with largest in the left parietal occipital region and left posterior temporal lobe..  Other smaller infarcts in the bilateral posterior frontal lobes, right paramedian posterior parietal lobe, right occipital lobe, left anterior frontal lobe. 2. Recent left thalamic lacunar infarct is unchanged.  3. One of recent cerebellar lacunar infarcts is much less conspicuous and others no longer restrict diffusion.  Other chronic cerebellar lacunar infarcts. 4. Post right frontal craniotomy for aneurysm clipping.    Dictated by (CST): Michael Gonzalez MD on 1/25/2025 at 6:22 PM     Finalized by (CST): Michael Gonzalez MD on 1/25/2025 at 6:31 PM          CTA BRAIN + CTA CAROTIDS (CPT=70496/06743)    Result Date: 1/25/2025  CONCLUSION:  1. No large vessel occlusion, hemodynamically significant stenosis, dissection, AVM or aneurysm. 2. Wall 3. Right frontal craniotomy for aneurysm clipping. 4. Patent proximal left subclavian artery stent. 5. Changes of chronic small vessel disease in both cerebral hemispheres. 6. Irregular atherosclerotic plaque in the thoracic aorta. 7. Advanced emphysema with pleural parenchymal scarring.     Dictated by (CST): Michael Gonzalez MD on 1/25/2025 at 1:31 PM     Finalized by (CST): Michael Gonzalez MD on 1/25/2025 at 1:49 PM          CT BRAIN OR HEAD (CPT=70450)    Result Date: 1/24/2025  CONCLUSION:  1. Subarachnoid hemorrhage in the left frontal high convexity in bilateral occipital lobes with mild cerebral edema. Continued surveillance is recommended.  2. Postoperative changes of frontal craniotomy and anterior parafalcine aneurysm clipping.  3. Senescent changes of parenchymal volume loss with sequela of  chronic microvascular ischemic disease.  4. There is large vessel atherosclerosis involving the anterior and posterior intracranial circulations.  5. Lesser incidental findings as above.      Results of this examination were conveyed to the patient's physician, Dr. Ronald March, by Dr. Gruber at 1838 on 01/24/2025.   Dictated by (CST): Sharath Gruber MD on 1/24/2025 at 6:35 PM     Finalized by (CST): Sharath Gruber MD on 1/24/2025 at 6:41 PM          CT BRAIN OR HEAD (CPT=70450)    Result Date: 1/24/2025  CONCLUSION:  1. Retained contrast material throughout the large intracranial vessels and cerebral sulci from same date cardiac angiogram.  Please note that this finding limits evaluation for detection of potential acute intracranial hemorrhage.  Aforementioned sulcal  enhancement most likely relates to a contrast staining.  However, if symptoms or strong clinical suspicion for acute intracranial hemorrhage persist, a short interval 6-12 hour follow-up head CT is recommended. 2. Subcentimeter enhancing foci in the left cerebellum (x2) likely relate to known enhancing subacute lacunar type infarcts.  Continued follow-up to ensure resolution of enhancement is advised. 3. Midline frontal craniotomy and anterior parafalcine aneurysm clipping. 4. Intracranial atherosclerosis. 5. Stable chronic moderate adenoid enlargement.  elm-remote  Dictated by (CST): Jose Enrique Valente MD on 1/24/2025 at 11:33 AM     Finalized by (CST): Jose Enrique Valente MD on 1/24/2025 at 11:40 AM          MRI BRAIN (CPT=70551)    Result Date: 1/20/2025  CONCLUSION:  1. Interval development of a punctate lacunar infarct of the left thalamus, concerning for acute ischemia.  2. Subacute lacunar infarcts of the left cerebellar hemisphere.   3. Postoperative changes of frontal craniotomy and parafalcine aneurysm clipping.  4. Senescent changes of parenchymal volume loss with mild sequela of chronic microvascular ischemic disease.  5. Lesser incidental findings  as above.      A preliminary report was issued by the [facility] teleradiology service. There are no major discrepancies.  elm-remote.   Dictated by (CST): Sharath Gruber MD on 1/20/2025 at 9:24 AM     Finalized by (CST): Sharath Gruber MD on 1/20/2025 at 9:31 AM          CT STROKE BRAIN (NO IV)(CPT=70450)    Result Date: 1/19/2025  CONCLUSION:  1. Small focus of hypoattenuation involving the left cerebellar hemisphere, which corresponds to the acute infarct demonstrated on the prior MRI brain dated 01/17/2025. 2. Otherwise, no transcortical area of hypoattenuation to suggest an acute infarct.  If there is clinical concern for an acute infarct, consider further evaluation with an MRI of the brain. 3. No acute intracranial hemorrhage, midline shift, mass effect, or hydrocephalus. 4. Redemonstrated postoperative changes from prior frontal craniotomy for aneurysm clipping along the course of the anterior cerebral arteries.  The associated metallic artifact from the aneurysm clips limits evaluation of the adjacent structures. 5. Lesser incidental findings described above.    Impression points 1-3 were communicated via telephone to Leonidas SOW at 9:39 a.m. on 01/19/2025 by Joe Deleon MD  Dictated by (CST): Joe Deleon MD on 1/19/2025 at 9:34 AM     Finalized by (CST): Joe Deleon MD on 1/19/2025 at 9:41 AM          CT STROKE CTA BRAIN/CTA NECK (W IV)(CPT=70496/44490)    Result Date: 1/17/2025  CONCLUSION:  1. Negative for hemodynamically significant stenosis or occlusion of the anterior or posterior circulations.   2. The internal carotid arteries demonstrate tortuosity proximally without evidence of hemodynamically stenosis or occlusion.  3. The vertebral arteries demonstrate contiguous patency bilaterally without evidence of flow-limiting stenosis.  4. 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 Thrombus extends into the proximal left subclavian artery origin, contributing to luminal  narrowing.  5. Dilatation of the main pulmonary artery trunk may relate to underlying pulmonary hypertension.  6. 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   7. Lesser incidental findings as above.   Dictated by (CST): Sharath Gruber MD on 1/17/2025 at 7:27 PM     Finalized by (CST): Sharath Gruber MD on 1/17/2025 at 7:36 PM          CT STROKE BRAIN (NO IV)(CPT=70450)    Result Date: 1/17/2025  CONCLUSION:  1. No acute intracranial hemorrhage or evidence of extended large vessel infarction. If there is clinical concern for acute ischemia, follow-up MRI suggested.   2. Postoperative changes of frontal craniotomy with anterior parafalcine aneurysm clipping.  3. Senescent changes of parenchymal volume loss with sequela of chronic microvascular ischemic disease.  4. There is large vessel atherosclerosis of the anterior and posterior intracranial circulations.  5. Lesser incidental findings as above.    This report was called immediately at 1915 hours to Emergency Department Pod 3 and discussed with the patient's ER physician, Dr. Cottrell.    Dictated by (CST): Sharath Gruber MD on 1/17/2025 at 7:11 PM     Finalized by (CST): Sharath Gruber MD on 1/17/2025 at 7:15 PM           Performed an independent visualization of head CT, CTA head and neck, MRI brain from 1/25/2025.  Also reviewed recent imaging throughout the week.  Reviewed multiple head CTs and CTAs as well as prior MRIs.  Imaging revealed: Agree with radiology read.    Education/Instructions given to: patient, daughter, granddaughter, daughter's   Barriers to Learning:None  Content: Refer to note above. Evaluation/Outcome: Verbalized understanding    Disclaimer:   This record was dictated using Dragon software. There may be errors due to voice recognition problems that were not realized and corrected during the completion of the note.      This document is not intended to support charting by exception.  Sections left blank in a  completed note should be presumed not to have been done.      Upon my evaluation, this patient had a high probability of imminent or life-threatening deterioration due to critical findings of laboratory tests, critical findings of imaging, central nervous system failure, acute CVA/stroke , and bleeding diatheses, which required my direct attention, intervention, and personal management.    I have personally provided 35 minutes of critical care time, exclusive of time spent on separately billable procedures.  Time includes review of all pertinent laboratory/radiology results, discussion with consultants, and monitoring for potential decompensation.  Performed interventions included fluids and use of antithrombotics in the setting of multiple GI AVMs with risk of bleeding, discussion of goals of care including treatments that need to be monitored for toxicity including heparin drip with risk of bleeding from GI AVMs and recurrent embolic strokes due to aortic thrombus .        Thank you.  Joe Kang D.O.   Vascular & General Neurology    01/26/25

## 2025-01-26 NOTE — PHYSICAL THERAPY NOTE
PHYSICAL THERAPY TREATMENT NOTE - INPATIENT     Room Number: 217/217-A       Presenting Problem: cerebellar CVA  Co-Morbidities : COPD, diabetes, HTN, RA, GI bleed    Problem List  Principal Problem:    Cerebellar stroke (HCC)  Active Problems:    AVM (arteriovenous malformation) of small bowel, acquired with hemorrhage    Dizziness    Right hand paresthesia    Thrombus of aorta (HCC)    Bilateral pulmonary embolism (HCC)    Acute cystitis without hematuria    Small bowel bleed not requiring more than 4 units of blood in 24 hours, ICU, or surgery    Acute blood loss anemia    Cerebrovascular accident (CVA) due to embolism of precerebral artery (HCC)      PHYSICAL THERAPY ASSESSMENT   Patient demonstrates limited progress this session, goals  remain in progress.      Patient is requiring minimal assist as a result of the following impairments: decreased functional strength, decreased endurance/aerobic capacity, impaired standing/dynamic balance, impaired coordination, decreased muscular endurance, cognitive deficits (confusion/disoriented), and severe R sided neglect/inability to attend to items/tasks/objects on R side .     Patient continues to function below baseline with transfers, gait, and stair negotiation.  Next session anticipate patient to progress transfers, gait, and stair negotiation.  Physical Therapy will continue to follow patient for duration of hospitalization.    Patient continues to benefit from continued skilled PT services: to facilitate return to prior level of function as patient demonstrates high motivation with excellent tolerance to an intensive therapy program .    PLAN DURING HOSPITALIZATION  Nursing Mobility Recommendation : 1 Assist  PT Device Recommendation: Rolling walker;Gait belt  PT Treatment Plan: Bed mobility;Body mechanics;Coordination;Endurance;Energy conservation;Patient education;Gait training;Family education;Range of motion;Strengthening;Stoop training;Stair  training;Transfer training;Balance training  Frequency (Obs): Daily     SUBJECTIVE  \"Whose over there? Are they twins? I can't read the clock, I know those simple numbers but my brain won't say them.\"    OBJECTIVE  Precautions: Bed/chair alarm    WEIGHT BEARING RESTRICTION       PAIN ASSESSMENT   Rating:  (Not rated)  Location: L upper back  Management Techniques: Activity promotion;Body mechanics;Repositioning (gentle/light soft tissue mobilization)    BALANCE  Static Sitting: Fair -  Dynamic Sitting: Fair -  Static Standing: Poor +  Dynamic Standing: Poor +    ACTIVITY TOLERANCE                          O2 WALK       AM-PAC '6-Clicks' INPATIENT SHORT FORM - BASIC MOBILITY  How much difficulty does the patient currently have...  Patient Difficulty: Turning over in bed (including adjusting bedclothes, sheets and blankets)?: A Little   Patient Difficulty: Sitting down on and standing up from a chair with arms (e.g., wheelchair, bedside commode, etc.): A Little   Patient Difficulty: Moving from lying on back to sitting on the side of the bed?: A Little   How much help from another person does the patient currently need...   Help from Another: Moving to and from a bed to a chair (including a wheelchair)?: A Little   Help from Another: Need to walk in hospital room?: A Little   Help from Another: Climbing 3-5 steps with a railing?: A Little     AM-PAC Score:  Raw Score: 18   Approx Degree of Impairment: 46.58%   Standardized Score (AM-PAC Scale): 43.63   CMS Modifier (G-Code): CK    FUNCTIONAL ABILITY STATUS  Functional Mobility/Gait Assessment  Gait Assistance: Minimum assistance  Distance (ft): 8'  Assistive Device: Rolling walker  Pattern:  (Slow lela, unsteady, trouble navigating walker)  Supine to Sit: contact guard assist  Sit to Stand: minimal assist    Skilled Therapy Provided: Pt received supine in bed, agreeable to PT intervention. Pt is able to long sit up in bed and move to sitting at EOB with SBA/CGA.  Once sitting upright pt able to maintain sitting balance with CGA. Sit <> stand with RW, CGA/Min A with hand over hand assist to grasp walker on R side. Pt needs assist to navigate walker around obstacles and guide her to chair for bed > chair transfer and 8' of ambulation with walker and Min A. Needs hand over hand assist again on R side to reach for arm rest.   Pt reports vision is not good - pt unable to track to R side with finger target, cannot turn head to read clock in R visual field and is unaware of nurse coming up on her R side to check blood sugar. Education provided to pt and pt's daughter to encourage attention to R side, have family sit on pt's R side and place photos/items on R side.   Pt c/o discomfort in L upper neck/back area thus provided gentle soft tissue mobilization and encouraged AROM to arms and scapular retractions.     The patient's Approx Degree of Impairment: 46.58% has been calculated based on documentation in the [facility] '6 clicks' Inpatient Daily Activity Short Form.  Research supports that patients with this level of impairment may benefit from HH PT however given pt's significant difference in baseline and severe R sided neglect I am recommending intensive rehabiliation upon discharge to optimize independence and reduce caregiver burden.   .  Final disposition will be made by interdisciplinary medical team.      Patient End of Session: Up in chair;Needs met;Call light within reach;RN aware of session/findings;All patient questions and concerns addressed;Alarm set;Family present    CURRENT GOALS   Goals to be met by: 1/25/25  Patient Goal Patient's self-stated goal is: go home, be stronger   Goal #1 Patient is able to demonstrate supine - sit EOB @ level: modified independent     Goal #1   Current Status CGA   Goal #2 Patient is able to demonstrate transfers Sit to/from Stand at assistance level: modified independent with walker - rolling     Goal #2  Current Status Min A with RW    Goal #3 Patient is able to ambulate 150 feet with assist device: walker - rolling at assistance level: supervision   Goal #3   Current Status 8' with RW and Min A   Goal #4 Patient will negotiate 10 stairs with rail and supervision   Goal #4   Current Status Not tested   Goal #5 Patient to demonstrate independence with home activity/exercise instructions provided to patient in preparation for discharge.   Goal #5   Current Status progressing     Gait Trainin minutes  Therapeutic Activity: 25 minutes  Neuromuscular Re-education: 12 minutes

## 2025-01-26 NOTE — OCCUPATIONAL THERAPY NOTE
OCCUPATIONAL THERAPY TREATMENT NOTE - INPATIENT        Room Number: 217/217-A  Presenting Problem: CVA    Problem List  Principal Problem:    Cerebellar stroke (HCC)  Active Problems:    AVM (arteriovenous malformation) of small bowel, acquired with hemorrhage    Dizziness    Right hand paresthesia    Thrombus of aorta (HCC)    Bilateral pulmonary embolism (HCC)    Acute cystitis without hematuria    Small bowel bleed not requiring more than 4 units of blood in 24 hours, ICU, or surgery    Acute blood loss anemia    Cerebrovascular accident (CVA) due to embolism of precerebral artery (HCC)      OCCUPATIONAL THERAPY ASSESSMENT   Patient demonstrates fair progress this session, goals remain in progress.    Patient is requiring minimal assist as a result of the following impairments: decreased endurance, decreased insight to deficits, visual deficits, and decreased visual spatial awareness.    Patient continues to function below baseline with adls and functional mobility.  Next session anticipate patient to progress bathing, upper body dressing, lower body dressing, and functional standing tolerance.  Occupational Therapy will continue to follow patient for duration of hospitalization.    Patient continues to benefit from continued skilled OT services: to facilitate return to prior level of function as patient demonstrates high motivation with excellent tolerance to an intensive therapy program.     PLAN DURING HOSPITALIZATION     OT Treatment Plan: Compensatory technique education;Patient/Family training;Patient/Family education;Endurance training;Functional transfer training;ADL training;Visual perceptual training     SUBJECTIVE  \"I know them numbers but I can't say them\"    OBJECTIVE  Precautions: Bed/chair alarm    PAIN ASSESSMENT  Ratin    ACTIVITY TOLERANCE  good    O2 SATURATIONS  Activity on room air    ACTIVITIES OF DAILY LIVING ASSESSMENT  AM-PAC ‘6-Clicks’ Inpatient Daily Activity Short Form  How much  help from another person does the patient currently need…  -   Putting on and taking off regular lower body clothing?: A Lot  -   Bathing (including washing, rinsing, drying)?: A Lot  -   Toileting, which includes using toilet, bedpan or urinal? : A Lot  -   Putting on and taking off regular upper body clothing?: A Lot  -   Taking care of personal grooming such as brushing teeth?: A Little  -   Eating meals?: A Little    AM-PAC Score:  Score: 14  Approx Degree of Impairment: 59.67%  Standardized Score (AM-PAC Scale): 33.39  CMS Modifier (G-Code): CK    FUNCTIONAL ADL ASSESSMENT  Eating: setup assist  Grooming: min assist  UB Dressing: min assist  LB Dressing: min assist  Toileting: na    Skilled Therapy Provided:  RN contacted prior to start of care. Treatment coordinated w/ PT. Pt agreeable to participation in therapy. Gait belt used during dynamic activity. Pt received in bed, alert and oriented to self. Pt transferred supine<>sit at eob w/ supervision.Pt able to manage socks from long sit w/ vc. Pt currently requires min a  to transfer sit<>stand bed/chair. Pt ambulating in the room w/ rw and min a. Pt presenting w/ decreased visual tracking and R inattention. Pt unable to locate items in the room (clock,family) w/o significant cuing. Family encouraged to approach pt from the right and encourage R attention w/ tasks. Pt unable to verbalize time on clock,but recognizes numbers. Pt requiring physical assist to place R hand on walker/chair. Visual deficits significantly limiting functional task completion. Pt would also benefit from Speech Language Eval      At end of session pt remaining up in chaIR w/ all needs in reach and alarm on;RN and daughter at bedside. RN aware of pt's status and performance in therapy      EDUCATION PROVIDED  Patient Education : Plan of Care; Fall Prevention; Functional Transfer Techniques  Patient's Response to Education: Verbalized Understanding; Requires Further Education    The  patient's Approx Degree of Impairment: 59.67% has been calculated based on documentation in the [facility] '6 clicks' Inpatient Daily Activity Short Form.  Research supports that patients with this level of impairment may benefit from IRF.  Final disposition will be made by interdisciplinary medical team.    Patient End of Session: Up in chair;Needs met;Call light within reach;RN aware of session/findings;With  staff;All patient questions and concerns addressed;Restraints;Family present    OT Goals:   Patient will complete functional transfer with mod I least restrictive Assistive device    Comment: Pt required min a    Patient will complete toileting with mod I  Comment: na    Patient will tolerate standing for 5 minutes in prep for adls with mod I   Comment:Pt maintained static standing w/rw and cga ~ 1 min    Patient will complete LB dressing mod I   Comment:pt required vc/min a          Goals  on: 2/3/25  Frequency: 3-5xs/week    Therapeutic Activity: 45 minutes

## 2025-01-26 NOTE — PROGRESS NOTES
[facility]     Gastroenterology Progress Note    Chester Bautista Patient Status:  Inpatient    1940 MRN [id number]   Location [facility] 3W/SW Attending Richard Guerra MD   Hosp Day # 8 PCP Heike Sotro MD       Subjective:   ISAAC overnight. The patient is more awake and responsive today.  An acute infarct was noted on CT brain imaging yesterday.      Objective:   Blood pressure (!) 165/74, pulse 90, temperature 96.7 °F (35.9 °C), temperature source Temporal, resp. rate 26, height 5' 1\" (1.549 m), weight 135 lb 9.6 oz (61.5 kg), SpO2 98%. Body mass index is 25.62 kg/m².    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  Work-up revealed extensive irregular thrombus of the aortic arch and L subclavian stenosis.      She was started on Plavix then switched to Brilinta - it was held a few days ago.     She is well-known to Dr. Mikey Garcia and more recently Dr. Zoila Mcqueen of our group for known chronic occult GI bleeding due to GI tract AVM lesions as per multiple EGD push enteroscopy procedures and VCE PillCam study.   As per operative notes summarized in the orginal consultation and below, these recurring AVM lesions do not appear amenable to endoscopy and ablation  treatments.  All of them also cannot be reached with an EGD, push enteroscopy, and colonoscopy.  Ms. Bautista started octreotide Sandostatin LAR monthly injections about 9 months ago which appeared to be helping keep her stable from an occult GI blood loss standpoint.    GI was called 1/24 after the patient developed dark stool and a drop in hemoglobin 1/24 to 6.4.  Earlier that day, she had a left subclavian artery angiogram and stenting. Heparin was given during the procedure. After a unit of PRBCs, her hgb responded. Her hgb this morning is 7.6.      It is likely that she bled from her known small bowel and/or colon AVMs after the left subclavian artery angiogram and stenting the morning of 1/24/25 - the heparin likely caused the bleed.      The bleeding stopped and she was hemodynamically stable. She was started on Octreotide 50 mcg sub-q TID 1/25/25 to prevent further bleeding from her known, multiple AVMs since she was due for her monthly octreotide LAR.     She had another acute CVA 1/25/25  that was noted on CT brain.     This was discussed with neurology - recommend restarting the Brilinta and monitoring for bleeding.  If OK, could consider starting a hepatin gtt since ideally, she warrants anticogulation from a CVA standpoint.  She will be high risk for rebleeding since the heparin is what triggered her most recent GI bleed.     If she develops evidence of recurrent bleeding despite the octreotide, could consider endoscopic evaluation on this admission vs transfer to a tertiary care center to consider balloon enteroscopy and colonoscopy to ablate any AVMs noted to give her the best shot at tolerating anticoagulation.     Recommendations:  Continue octreotide 50 mcg sub-q TID (she was due for her monthly outpatient LAR injection ~1/25/25 per her family)  OK to resume anti-platelet therapy if needed from a vascular/cardiology/neuro standpoint. See discussion with neurology above re: anticoagulation.   Will  continue to monitor her closely  If there is evidence of recurrent bleeding despite starting the octreotide, will consider endoscopic evaluation vs transfer to a tertiary care center for balloon enteroscopy and colonoscopy   Trend CBC q8 for now with goal hemoglobin per vascular surgery but at least 7-8  After discharge, continue close outpatient monitoring of her CBC.  May require weekly CBC and iron studies the next few months    Sarah Cherry MD  [facility]          Results:     Lab Results   Component Value Date    WBC 14.5 (H) 01/25/2025    HGB 7.6 (L) 01/26/2025    HCT 24.5 (L) 01/26/2025    .0 01/25/2025    CREATSERUM 0.88 01/26/2025    BUN 11 01/26/2025     01/26/2025    K 2.9 (LL) 01/26/2025     01/26/2025    CO2 22.0 01/26/2025    GLU 65 (L) 01/26/2025    CA 9.8 01/26/2025    ALB 3.4 01/26/2025    ALKPHO 71 01/26/2025    BILT 0.4 01/26/2025    TP 6.5 01/26/2025    AST 23 01/26/2025    ALT <7 (L) 01/26/2025    PTT 26.6 01/25/2025    INR 1.04 01/25/2025    T4F 1.2 01/17/2025    TSH 0.418 (L) 01/17/2025    DDIMER 2.77 (H) 01/25/2025    ESRML >130 (H) 11/29/2024    CRP 3.00 (H) 11/29/2024    MG 1.6 01/26/2025    PHOS 4.0 01/22/2025    B12 814 11/01/2024       CT BRAIN OR HEAD (CPT=70450)    Result Date: 1/25/2025  CONCLUSION:  1. No intracranial hemorrhage. 2. Acute left parietal occipital cortical infarct and posterior temporal cortical infarct.  Other acute infarcts seen on the recent MRI are not detectable with certainty. 3. Right frontal craniotomy and para falcine aneurysm clipping.  New line large vessel intracranial atherosclerosis.    Dictated by (CST): Michael Gonzalez MD on 1/25/2025 at 7:43 PM     Finalized by (CST): Michael Gonzalez MD on 1/25/2025 at 7:48 PM          MRI BRAIN (CPT=70551)    Result Date: 1/25/2025  CONCLUSION:  1. Interval development of multiple acute infarcts with largest in the left parietal occipital region and left posterior temporal  lobe..  Other smaller infarcts in the bilateral posterior frontal lobes, right paramedian posterior parietal lobe, right occipital lobe, left anterior frontal lobe. 2. Recent left thalamic lacunar infarct is unchanged.  3. One of recent cerebellar lacunar infarcts is much less conspicuous and others no longer restrict diffusion.  Other chronic cerebellar lacunar infarcts. 4. Post right frontal craniotomy for aneurysm clipping.    Dictated by (CST): Michael Gonzalez MD on 1/25/2025 at 6:22 PM     Finalized by (CST): Michael Gonzalez MD on 1/25/2025 at 6:31 PM          CTA BRAIN + CTA CAROTIDS (CPT=70496/34017)    Result Date: 1/25/2025  CONCLUSION:  1. No large vessel occlusion, hemodynamically significant stenosis, dissection, AVM or aneurysm. 2. Wall 3. Right frontal craniotomy for aneurysm clipping. 4. Patent proximal left subclavian artery stent. 5. Changes of chronic small vessel disease in both cerebral hemispheres. 6. Irregular atherosclerotic plaque in the thoracic aorta. 7. Advanced emphysema with pleural parenchymal scarring.     Dictated by (CST): Michael Gonzalez MD on 1/25/2025 at 1:31 PM     Finalized by (CST): Michael Gonzalez MD on 1/25/2025 at 1:49 PM          XR CHEST AP PORTABLE  (CPT=71045)    Result Date: 1/25/2025  CONCLUSION:   Left internal jugular central venous catheter tip over the right atrium.  Metallic stent over the upper mediastinum, new from prior.  Correlate with recent intervention.  Background of pulmonary fibrosis.  Small left pleural effusion with left basilar opacity which may represent subsegmental atelectasis and/or other superimposed process.  A preliminary report was issued by the [facility] teleradiology service. There are no clinically actionable discrepancies.    Dictated by (CST): Gisselle Mendez MD on 1/25/2025 at 9:08 AM     Finalized by (CST): Gisselle Mendez MD on 1/25/2025 at 9:10 AM          CT ABDOMEN+PELVIS(CPT=74176)    Result Date: 1/24/2025  CONCLUSION:  1.  Small amount of ill-defined hematoma in left inguinal femoral region extending into left hemipelvis with slight haziness to the fat in the iliac fossa.  No large hematoma. 2. Contrast excretion into normal renal collecting systems and contrast distended urinary bladder from the recent angiogram. 3. Emphysema and scarring in both lungs.    Dictated by (CST): Michael Gonzalez MD on 1/24/2025 at 11:42 PM     Finalized by (CST): Michael Gonzalez MD on 1/24/2025 at 11:57 PM          US VENOUS DOPPLER LEG BILAT - DIAG IMG (CPT=93970)    Result Date: 1/24/2025  CONCLUSION:  Negative for sonographic evidence of deep venous thrombosis in either lower extremity.    Dictated by (CST): Sharath Gruber MD on 1/24/2025 at 7:21 PM     Finalized by (CST): Sharath Gruber MD on 1/24/2025 at 7:22 PM          CT BRAIN OR HEAD (CPT=70450)    Result Date: 1/24/2025  CONCLUSION:  1. Subarachnoid hemorrhage in the left frontal high convexity in bilateral occipital lobes with mild cerebral edema. Continued surveillance is recommended.  2. Postoperative changes of frontal craniotomy and anterior parafalcine aneurysm clipping.  3. Senescent changes of parenchymal volume loss with sequela of chronic microvascular ischemic disease.  4. There is large vessel atherosclerosis involving the anterior and posterior intracranial circulations.  5. Lesser incidental findings as above.      Results of this examination were conveyed to the patient's physician, Dr. Ronald March, by Dr. Gruber at 1838 on 01/24/2025.   Dictated by (CST): Sharath Gruber MD on 1/24/2025 at 6:35 PM     Finalized by (CST): Sharath Gruber MD on 1/24/2025 at 6:41 PM                 Endoscopy history:  3/9/2024 VCE video capsule exam attempted but unsuccessful due to capsule not leaving the stomach     2/28/2024 EGD examination with push enteroscopy Dr. Zoila Mcqueen:  Three nonbleeding AVMs ablated in the stomach  Over 6-7 \"small to medium sized\" nonbleeding AVMs seen but not cauterized in  the duodenum  Plan initiated for octreotide injections     2/27/2024 EGD examination with push enteroscopy Dr. Zoila Mcqueen:  Two small AVM lesions in the stomach, 1 actively bleeding, both cauterized  Solid food in the stomach limited the exam that day; additional AVMs were suspected     12/21/2023 EGD examination with push enteroscopy Dr. Mikey Garcia:  Four tiny \" pinpoint\" AVM lesions cauterized in the duodenum, one actively bleeding     12/11/2023 VCE video capsule examination Dr. Mikey Garcia:  Small bowel passage time 4 hours 28 minutes with adequate prep  Multiple small bowel AVM lesions noted with 1 area of slow active bleeding     6/15/2023 EGD/colonoscopy examination Dr. Mikey Garcia:  Indication: Severe anemia  4mm gastric AVM lesion, cauterized  Three 2mm duodenal AVM lesions, cauterized  2mm cecal AVM lesion, cauterized  Pancolonic diverticulosis  Normal terminal ileum     7/21/2022 EGD/colonoscopy examinations Dr. Mikey Garcia:  Indication: Severe anemia  Two 2mm duodenal AVM lesions, cauterized  4mm cecal AVM lesion, cauterized  1 small 4mm colon polyp  Pancolonic diverticulosis  Normal terminal ileum

## 2025-01-26 NOTE — PROGRESS NOTES
Pulmonary/ICU/Critical Care Progress Note        Reason for Consultation: GIB, SAH  Referring Physician: Dr. Carranza      Subjective:  Afebrile on RA  Reports she's seeing things  Per dtr, pt is able to speech better  Was hypoglycemic this am      From the initial consultation  Pt unable to provide much info this afternoon so some info obtained from her dtr at bedside. 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 Parker), breast CA, DM, HTN, PONV, PE s/p IVC filter that has been removed since, RA admitted with severe vertigo type symptoms on 1/17/25.   On admission,seen by neurology. MRI brain showed acute CVA of left cerebellum and recommended starting plavix. GI was consulted to approve initiation of plavix given hx of AVMs. Further workup with CT angio of the head and neck was notable for bilateral antegrade vertebral artery flow, however there was mild to moderate stenosis at the origin of the left vertebral artery and there was a thrombus in the aortic arch encroaching to the origin of the left subclavian artery. Vascular surgery was consulted. Her hospital course has been complicated with another stroke identified in the left thalamus on MRI on 1/19/2025.   Pt was subsequently started on brilinta and vascular surgery took the pt for a left upper extremity, aortic arch and cerebral angiogram. Pt underwent stent placement in left subclavian artery. However, the pt complained of headaches post intervention, a subsequent CT head revealed contrast extravasation versus SAH.   Pt is now transferred to ICU for q1 neurochecks  Of note, pt is also more anemic today with hg drop to 6.4. she reports her abdomen hurts since this am. She just had a BM that was very dark concerning for GIB.      REVIEW OF SYSTEMS:  Positives and negatives as noted in HPI. All other review of systems  otherwise are either limited (due to pt/family inability to provide) or negative.      PAST MEDICAL HISTORY:  Past Medical History:   Diagnosis Date    Anxiety state     Cancer (HCC)     breast cancer 2018    Cerebellar stroke (HCC) 1/17/2025    Chronic obstructive pulmonary disease, unspecified (HCC) 05/04/2023    Chronic obstructive pulmonary disease, unspecified (HCC) 05/04/2023    COPD (chronic obstructive pulmonary disease) (HCC)     Cystic thyroid nodule 12/11/2024    Diabetes (HCC)     Diabetes mellitus (HCC)     Essential hypertension     Exposure to medical diagnostic radiation     High blood pressure     High cholesterol     History of blood transfusion 07/2022    low hemoglobin    Osteoarthritis     PONV (postoperative nausea and vomiting)     Pulmonary embolism (HCC)     Pulmonary emphysema (HCC)     Rheumatoid arthritis (HCC)          PAST SURGICAL HISTORY:  Past Surgical History:   Procedure Laterality Date    Cataract extraction w/  intraocular lens implant [facility]     [facility]onoscopy      Colonoscopy N/A 07/21/2022    Procedure: COLONOSCOPY;  Surgeon: Mikey Garcia MD;  Location: [facility] ENDOSCOPY    Colonoscopy N/A 06/15/2023    Procedure: COLONOSCOPY;  Surgeon: Mikey Garcia MD;  Location: [facility] ENDOSCOPY    Correct bunion,simple      right foot  1993    Egd  12/21/2023    Dr. Garcia; Duodenal AVM's x4 cauterized    Hysterectomy      1972, no abnormal Paps, due to heavy bleeding with fibroids.  Not sure if it had bilateral salpingo-oophorectomy.    Ir aneurysm repairm  2010    Computer assisted volumetric craniofomy with clipping of anterior cerebral artery.    Lumpectomy right      Radiation right      Rotator cuff repair      1993    Total knee replacement Right 2010    Total knee replacement Left 07/02/2015    Transoral incisionless fundoplication - internal  01/25/2012 Fredy fundoplication at [facility] Dr. Fournier.    Fredy fundoplication at [facility] Dr. Fournier.    Trigger finger release      left hand      Yag capsulotomy - ou - both eyes Bilateral 2021    done in [place]         PAST SOCIAL HISTORY:  Social History     Socioeconomic History    Marital status:    Tobacco Use    Smoking status: Former     Current packs/day: 0.00     Types: Cigarettes     Quit date:      Years since quittin.0     Passive exposure: Never    Smokeless tobacco: Never    Tobacco comments:     [id number]   Vaping Use    Vaping status: Never Used   Substance and Sexual Activity    Alcohol use: Never    Drug use: Never         PAST FAMILY HISTORY:  Family History   Problem Relation Age of Onset    Heart Surgery Mother         Leaky valve at 39 y/o.     Prostate Cancer Brother     Cancer Brother     Diabetes Maternal Grandmother     Diabetes Paternal Aunt     Breast Cancer Self 78    Breast Cancer Niece         before 50    Macular degeneration Neg     Glaucoma Neg          ALLERGIES:  Allergies[1]      MEDS:  Home Medications:  Medications Taking[2]    Scheduled Medication:   potassium chloride  40 mEq Intravenous Once    Followed by    potassium chloride  20 mEq Intravenous Once    magnesium sulfate  2 g Intravenous Once    midazolam  2 mg Intravenous Once    octreoTIDe  50 mcg Subcutaneous Q8H    pantoprazole  40 mg Intravenous Q12H    rosuvastatin  10 mg Oral Nightly    sennosides  8.6 mg Oral BID    [Held by provider] ticagrelor  90 mg Oral Q12H    cefepime  1 g Intravenous Q24H    cetirizine  5 mg Oral Daily    docusate sodium  100 mg Oral BID    carvedilol  12.5 mg Oral BID with meals    fluticasone-salmeterol  1 puff Inhalation 2 times per day    gabapentin  100 mg Oral Nightly    montelukast  10 mg Oral Nightly    predniSONE  5 mg Oral Daily    losartan  50 mg Oral Daily    hydroxychloroquine  300 mg Oral Daily    insulin aspart  1-11 Units Subcutaneous TID CC    insulin degludec  10 Units Subcutaneous Nightly     Continuous Infusing Medication:    sodium chloride 100 mL/hr at 01/26/25 0253    sodium chloride Stopped (01/23/25 1630)     PRN Medications:    glucose **OR** glucose **OR** glucose-vitamin C **OR** dextrose **OR** glucose **OR** glucose **OR** glucose-vitamin C    HYDROmorphone    LORazepam    acetaminophen    polyethylene glycol (PEG 3350)    magnesium hydroxide    bisacodyl    fleet enema    meclizine    acetaminophen **OR** acetaminophen    hydrALAzine    ondansetron    metoclopramide    acetaminophen    ondansetron    albuterol    cyclobenzaprine    oxyCODONE       PHYSICAL EXAM:  BP (!) 165/66 (BP Location: Left arm)   Pulse 89   Temp 97.1 °F (36.2 °C) (Temporal)   Resp 12   Ht 5' 1\" (1.549 m)   Wt 135 lb 9.6 oz (61.5 kg)   SpO2 98%   BMI 25.62 kg/m²      CONSTITUTIONAL: less confused this am, no apparent distress  HEENT: atraumatic normocephalic  MOUTH: mucous membranes are moist. No OP exudates  NECK/THROAT: no JVD. Trachea midline. No obvious thyromegaly  LUNG: clear upper b/l no wheezing, crackles. Chest symmetric with respiratory motion  HEART: regular rate and rhythm, no obvious murmers or gallops noted  ABD: soft but tender. + bowel sounds. No organomegaly noted  EXT: no clubbing, cyanosis, or edema noted. Pulses intact grossly. Left groin site clean, dry, no ecchymosis, soft  NEURO/MUSCULOSKELETAL: no obvious gross deficits. Equal strength  SKIN: warm, dry. No obvious lesions noted  LYMPH: no obvious LAD  Right port. Left subclavian CVC placed      IMAGES:   MRI brain 1/25/25  1. Interval development of multiple acute infarcts with largest in the left parietal occipital region and left posterior temporal lobe..  Other smaller infarcts in the bilateral posterior frontal lobes, right paramedian posterior parietal lobe, right   occipital lobe, left anterior frontal lobe.   2. Recent left thalamic lacunar infarct is unchanged.    3. One of recent cerebellar lacunar infarcts is much less conspicuous and others no longer restrict  diffusion.  Other chronic cerebellar lacunar infarcts.   4. Post right frontal craniotomy for aneurysm clipping.     CT abd/pelvis 1/24/25  CONCLUSION:   1. Small amount of ill-defined hematoma in left inguinal femoral region extending into left hemipelvis with slight haziness to the fat in the iliac fossa.  No large hematoma.   2. Contrast excretion into normal renal collecting systems and contrast distended urinary bladder from the recent angiogram.   3. Emphysema and scarring in both lungs.     CT brain repeat on 1/24/25  CONCLUSION:   1. Subarachnoid hemorrhage in the left frontal high convexity in bilateral occipital lobes with mild cerebral edema. Continued surveillance is recommended.   2. Postoperative changes of frontal craniotomy and anterior parafalcine aneurysm clipping.   3. Senescent changes of parenchymal volume loss with sequela of chronic microvascular ischemic disease.   4. There is large vessel atherosclerosis involving the anterior and posterior intracranial circulations.   5. Lesser incidental findings as above.     LE dopplers 1/24/25  Negative for sonographic evidence of deep venous thrombosis in either lower extremity.     CT brain 1/24/25  CONCLUSION:   1. Retained contrast material throughout the large intracranial vessels and cerebral sulci from same date cardiac angiogram.  Please note that this finding limits evaluation for detection of potential acute intracranial hemorrhage.  Aforementioned sulcal    enhancement most likely relates to a contrast staining.  However, if symptoms or strong clinical suspicion for acute intracranial hemorrhage persist, a short interval 6-12 hour follow-up head CT is recommended.   2. Subcentimeter enhancing foci in the left cerebellum (x2) likely relate to known enhancing subacute lacunar type infarcts.  Continued follow-up to ensure resolution of enhancement is advised.   3. Midline frontal craniotomy and anterior parafalcine aneurysm clipping.   4.  Intracranial atherosclerosis.   5. Stable chronic moderate adenoid enlargement.       MRI brain 1/19/25  CONCLUSION:   1. Interval development of a punctate lacunar infarct of the left thalamus, concerning for acute ischemia.   2. Subacute lacunar infarcts of the left cerebellar hemisphere.    3. Postoperative changes of frontal craniotomy and parafalcine aneurysm clipping.   4. Senescent changes of parenchymal volume loss with mild sequela of chronic microvascular ischemic disease.   5. Lesser incidental findings as above.     CT brain 1/19/25  1. Small focus of hypoattenuation involving the left cerebellar hemisphere, which corresponds to the acute infarct demonstrated on the prior MRI brain dated 01/17/2025.   2. Otherwise, no transcortical area of hypoattenuation to suggest an acute infarct.  If there is clinical concern for an acute infarct, consider further evaluation with an MRI of the brain.   3. No acute intracranial hemorrhage, midline shift, mass effect, or hydrocephalus.   4. Redemonstrated postoperative changes from prior frontal craniotomy for aneurysm clipping along the course of the anterior cerebral arteries.  The associated metallic artifact from the aneurysm clips limits evaluation of the adjacent structures.   5. Lesser incidental findings described above.       LE dopplers 1/18/25  No DVT in either lower extremity.     MRI brain 1/18/25  CONCLUSION:   Limited 3-sequence stroke protocol study was performed. Within these parameters:   1. Acute to early subacute lacunar infarctions in the left cerebellar hemisphere.   2. Stable postoperative changes from a remote frontal craniotomy and anterior parafalcine aneurysm clipping.   3. Stable mild chronic microangiopathic ischemic changes.     CT C/A/P with contrast 1/17/25  1. Acute pulmonary emboli involving the bilateral lower lobe subsegmental pulmonary artery branches.   2. No evidence of aortic dissection.   3. Ascending thoracic aortic ectasia.  Multifocal irregular partially ulcerated peripheral mural thrombus is observed throughout the thoracic and abdominal aorta.    4. Dilatation of the main pulmonary artery trunk may relate to underlying pulmonary hypertension.   5. Moderate to severe centrilobular/paraseptal emphysema with extensive postradiation fibrosis and chronic opacity of the right middle lobe.   6. Multifocal pleuroparenchymal scarring is apparent.   7. Proximal distal colonic diverticulosis without CT evidence of acute complication in the imaged volume.   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   9. Lesser incidental findings as above.     CTA brain 1/17/25  CONCLUSION:   1. Negative for hemodynamically significant stenosis or occlusion of the anterior or posterior circulations.    2. The internal carotid arteries demonstrate tortuosity proximally without evidence of hemodynamically stenosis or occlusion.   3. The vertebral arteries demonstrate contiguous patency bilaterally without evidence of flow-limiting stenosis.   4. 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 Thrombus extends into the proximal left subclavian artery origin, contributing to luminal narrowing.   5. Dilatation of the main pulmonary artery trunk may relate to underlying pulmonary hypertension.   6. 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    7. Lesser incidental findings as above.       LABS:  Recent Labs   Lab 01/24/25  1519 01/25/25  0332 01/25/25  0900 01/25/25  1604 01/25/25  2357   RBC 2.78* 3.18*  --  3.03*  --    HGB 6.4* 8.0* 7.9* 7.7* 7.4*   HCT 22.2* 25.3* 24.9* 24.4* 23.7*   MCV 79.9* 79.6*  --  80.5  --    MCH 23.0* 25.2*  --  25.4*  --    MCHC 28.8* 31.6  --  31.6  --    RDW 17.5* 16.6*  --  16.8*  --    NEPRELIM 10.12* 11.71*  --  12.54*  --    WBC 13.4* 14.9*  --  14.5*  --    .0 375.0  375.0  --  336.0  --        Recent Labs   Lab 01/24/25  1519 01/25/25  0652 01/26/25  0518   * 130* 65*   BUN  23 16 11   CREATSERUM 1.23* 1.03* 0.88   EGFRCR 43* 53* 64   CA 9.7 10.1 9.8   ALB 3.2 3.6 3.4    142 139   K 3.5 3.9  3.9 2.9*    108 107   CO2 27.0 24.0 22.0   ALKPHO 74 74 71   AST 18 18 23   ALT <7* 9* <7*   BILT 0.2 0.3 0.4   TP 6.4 6.9 6.5       ASSESSMENT/PLAN:  CVA x 2 and found to have left aortic arch and subclavian thrombus  -s/p stent placement on 1/24/25. Post op pt developed a severe HA   -imaging concerning for SAH which improved. Most recent MRI confirms multiple strokes  -NS and neurology following  -q1 neurochecks  -repeat CT head shows SAH   -SBP control/parameters < 140 as per NS and neurology recommendations  -was on brilinta. Previous to that was on plavix. Hold as per NS and neurology. Given 1 PLT and 1 FFP two days ago since was on plavix and brilinta  -now with new acute strokes. Neurology and GI to assess plan for transfusion and anticoagulation/antiPLT therapy   -monitor in ICU    Acute b/l pulmonary emboli  -previous IVC filter removed  -LE dopplers neg on 1/18/25  -rechecked LE dopplers 1//24/25 remained neg    Left groin hematoma  -site appears soft  -anticoagulation being held   -defer to vascular further intervention    Hx of AVMs s/p cauterization in the past and on octreotide outpt infusions  -hg downtrended on 1/24/25 and pt complaining of abd pain. Also had a dark BM  -transfused 1 unit pRBC. Repeat H/H improved  -was initially seen by GI, recommend reconsulting GI to come back and see the pt again  -PPI    Hypoglycemic event this am  -received insulin last night  -switch IVF to d5    CPFE  -advair  -supp 02 as needed    Klebsiella UTI  -on cefepime     DM  -insulin and accuchecks    Proph:  -DVT: SCDs    Updated pt and dtr at bedside.    Thank you for the opportunity to care for Chester Bautista.      CHAU Roque DO, MPH  Pulmonary Critical Care Medicine  [facility]                       [1]   Allergies  Allergen  Reactions    Celebrex [Celecoxib] PALPITATIONS    Penicillins ITCHING and SWELLING    Amlodipine OTHER (SEE COMMENTS)     Calves Swelling     Metformin And Related UNKNOWN    Olmesartan UNKNOWN   [2]   Current Facility-Administered Medications for the 1/17/25 encounter (Hospital Encounter)   Medication Dose Route Frequency Provider Last Rate Last Admin    Certolizumab Pegol  mg  400 mg Subcutaneous Q14 Days Ashia Goodrich MD   400 mg at 01/09/25 1225    octreoTIDe (Sandostatin LAR) IM injection 20 mg  20 mg Intramuscular Q30 Days Zoila Mcqueen MD         Outpatient Medications Marked as Taking for the 1/17/25 encounter (Hospital Encounter)   Medication Sig Dispense Refill    acetaminophen 325 MG Oral Tab Take 2 tablets (650 mg total) by mouth every 8 (eight) hours.      rosuvastatin 20 MG Oral Tab Take 1 tablet (20 mg total) by mouth nightly. 90 tablet 3    magnesium oxide 400 MG Oral Tab Take 1 tablet (400 mg total) by mouth in the morning, at noon, and at bedtime. Per Dr. JENSEN Mata      valsartan 80 MG Oral Tab 1  1/2 tabs every 12 hrs. (Patient taking differently: Take 1 tablet (80 mg total) by mouth 2 (two) times daily. 1  1/2 tabs every 12 hrs.) 270 tablet 3    loratadine 10 MG Oral Tab TAKE 1 TABLET BY MOUTH EVERY DAY 30 tablet 1    Potassium Chloride ER 20 MEQ Oral Tab CR Take 40 mEq by mouth every morning. 60 tablet 1    hydroxychloroquine 200 MG Oral Tab Take 1.5 tablets (300 mg total) by mouth daily. 135 tablet 1    predniSONE 5 MG Oral Tab Take 1 tablet (5 mg total) by mouth daily. 90 tablet 1    famotidine 20 MG Oral Tab Take 1 tablet (20 mg total) by mouth 2 (two) times daily. Patient gets over the counter      glipiZIDE 5 MG Oral Tab Take 1 tablet (5 mg total) by mouth every morning before breakfast. 90 tablet 0    albuterol 108 (90 Base) MCG/ACT Inhalation Aero Soln TAKE 2 PUFFS BY MOUTH EVERY 4 TO 6 HOURS AS NEEDED 6.7 each 5    cyclobenzaprine 10 MG Oral Tab Take 1 tablet (10 mg total) by  mouth nightly as needed. 90 tablet 1    insulin glargine (LANTUS SOLOSTAR) 100 UNIT/ML Subcutaneous Solution Pen-injector Inject 10 Units into the skin daily with dinner. (Patient taking differently: Inject 14 Units into the skin daily with dinner.) 3 mL 1    montelukast 10 MG Oral Tab Take 1 tablet (10 mg total) by mouth nightly. 90 tablet 3    carvedilol 12.5 MG Oral Tab Take 1 tablet (12.5 mg total) by mouth 2 (two) times daily with meals. 180 tablet 3    fluticasone-salmeterol (WIXELA INHUB) 250-50 MCG/ACT Inhalation Aerosol Powder, Breath Activated Inhale 1 puff into the lungs every 12 (twelve) hours. 1 each 5    gabapentin 100 MG Oral Cap Take 1 capsule (100 mg total) by mouth nightly. 90 capsule 3

## 2025-01-26 NOTE — PROGRESS NOTES
[facility]  part of [facility]    Progress Note    Chester Bautista Patient Status:  Inpatient    1940 MRN [id number]   Location [facility] 3W/SW Attending Richard Guerra MD   Hosp Day # 8 PCP Heike Sorto MD       Subjective:   Sitting up comfortably and in NAD. Appears better than earlier.   Denied any active complaints at the time of interview.  S/p stent placement on 2025  No overnight events reported.    Objective:   Blood pressure (!) 178/95, pulse 88, temperature 96.7 °F (35.9 °C), temperature source Temporal, resp. rate 22, height 5' 1\" (1.549 m), weight 135 lb 9.6 oz (61.5 kg), SpO2 98%.    General: AAF, NAD, ill appearing but improved  Respiratory: Clear to auscultation bilaterally. No wheezes. No rhonchi.  Cardiovascular: RRR  Abdomen: Soft, nontender, nondistended.  Neurologic: No focal neurological deficits.   Musculoskeletal: Moves all extremities.  Extremities: No edema or cyanosis.    Current Inpatient Medications:     Current Facility-Administered Medications:     potassium chloride 40 mEq in 250mL sodium chloride 0.9% IVPB premix, 40 mEq, Intravenous, Once **FOLLOWED BY** potassium chloride 20 mEq/100mL IVPB premix 20 mEq, 20 mEq, Intravenous, Once    glucose (Dex4) 15 GM/59ML oral liquid 15 g, 15 g, Oral, Q15 Min PRN **OR** glucose (Glutose) 40% oral gel 15 g, 15 g, Oral, Q15 Min PRN **OR** glucose-vitamin C (Dex-4) chewable tab 4 tablet, 4 tablet, Oral, Q15 Min PRN **OR** dextrose 50% injection 50 mL, 50 mL, Intravenous, Q15 Min PRN **OR** glucose (Dex4) 15 GM/59ML oral liquid 30 g, 30 g, Oral, Q15 Min PRN **OR** glucose (Glutose) 40% oral gel 30 g, 30 g, Oral, Q15 Min PRN **OR** glucose-vitamin C (Dex-4) chewable tab 8 tablet, 8 tablet, Oral, Q15 Min PRN    dextrose 5%-sodium chloride 0.9% infusion, , Intravenous, Continuous    ticagrelor (Brilinta) tab 90 mg, 90 mg, Oral, Q12H    lidocaine-menthol 4-1 % patch 1 patch, 1 patch,  Transdermal, Daily    midazolam (Versed) 2 MG/2ML injection 2 mg, 2 mg, Intravenous, Once    octreoTIDe (SandoSTATIN) 50 mcg/mL injection 50 mcg, 50 mcg, Subcutaneous, Q8H    HYDROmorphone (Dilaudid) 1 MG/ML injection 0.2 mg, 0.2 mg, Intravenous, Q4H PRN    pantoprazole (Protonix) 40 mg in sodium chloride 0.9% PF 10 mL IV push, 40 mg, Intravenous, Q12H    sodium chloride 0.9% infusion, , Intravenous, Continuous    rosuvastatin (Crestor) tab 10 mg, 10 mg, Oral, Nightly    sennosides (Senokot) tab 8.6 mg, 8.6 mg, Oral, BID    LORazepam (Ativan) tab 0.5 mg, 0.5 mg, Oral, PRN    ceFEPIme (Maxipime) 1 g in sodium chloride 0.9% 100 mL IVPB-MBP, 1 g, Intravenous, Q24H    cetirizine (ZyrTEC) tab 5 mg, 5 mg, Oral, Daily    acetaminophen (Tylenol Extra Strength) tab 500 mg, 500 mg, Oral, Q6H PRN    docusate sodium (Colace) cap 100 mg, 100 mg, Oral, BID    polyethylene glycol (PEG 3350) (Miralax) 17 g oral packet 17 g, 17 g, Oral, Daily PRN    magnesium hydroxide (Milk of Magnesia) 400 MG/5ML oral suspension 30 mL, 30 mL, Oral, Daily PRN    bisacodyl (Dulcolax) 10 MG rectal suppository 10 mg, 10 mg, Rectal, Daily PRN    fleet enema (Fleet) rectal enema 133 mL, 1 enema, Rectal, Once PRN    meclizine (Antivert) tab 12.5 mg, 12.5 mg, Oral, TID PRN    acetaminophen (Tylenol) tab 650 mg, 650 mg, Oral, Q4H PRN **OR** acetaminophen (Tylenol) rectal suppository 650 mg, 650 mg, Rectal, Q4H PRN    hydrALAzine (Apresoline) 20 mg/mL injection 10 mg, 10 mg, Intravenous, Q2H PRN    ondansetron (Zofran) 4 MG/2ML injection 4 mg, 4 mg, Intravenous, Q6H PRN    metoclopramide (Reglan) 5 mg/mL injection 5 mg, 5 mg, Intravenous, Q8H PRN    acetaminophen (Tylenol) tab 650 mg, 650 mg, Oral, Q6H PRN    ondansetron (Zofran) 4 MG/2ML injection 4 mg, 4 mg, Intravenous, Q6H PRN    albuterol (Ventolin) (2.5 MG/3ML) 0.083% nebulizer solution 2.5 mg, 2.5 mg, Nebulization, Q4H PRN    carvedilol (Coreg) tab 12.5 mg, 12.5 mg, Oral, BID with meals     cyclobenzaprine (Flexeril) tab 10 mg, 10 mg, Oral, Nightly PRN    fluticasone-salmeterol (Advair Diskus) 250-50 MCG/ACT inhaler 1 puff, 1 puff, Inhalation, 2 times per day    gabapentin (Neurontin) cap 100 mg, 100 mg, Oral, Nightly    montelukast (Singulair) tab 10 mg, 10 mg, Oral, Nightly    predniSONE (Deltasone) tab 5 mg, 5 mg, Oral, Daily    losartan (Cozaar) tab 50 mg, 50 mg, Oral, Daily    hydroxychloroquine (Plaquenil) tab 300 mg, 300 mg, Oral, Daily    oxyCODONE immediate release tab 5 mg, 5 mg, Oral, Q6H PRN    insulin aspart (NovoLOG) 100 Units/mL FlexPen 1-11 Units, 1-11 Units, Subcutaneous, TID CC    insulin degludec (Tresiba) 100 units/mL flextouch 10 Units, 10 Units, Subcutaneous, Nightly      Results:     Recent Labs   Lab 01/24/25  1519 01/25/25  0332 01/25/25  0900 01/25/25  1604 01/25/25  2357 01/26/25  0849   RBC 2.78* 3.18*  --  3.03*  --   --    HGB 6.4* 8.0*   < > 7.7* 7.4* 7.6*   HCT 22.2* 25.3*   < > 24.4* 23.7* 24.5*   MCV 79.9* 79.6*  --  80.5  --   --    MCH 23.0* 25.2*  --  25.4*  --   --    MCHC 28.8* 31.6  --  31.6  --   --    RDW 17.5* 16.6*  --  16.8*  --   --    NEPRELIM 10.12* 11.71*  --  12.54*  --   --    WBC 13.4* 14.9*  --  14.5*  --   --    .0 375.0  375.0  --  336.0  --   --     < > = values in this interval not displayed.         Recent Labs   Lab 01/24/25  1519 01/25/25  0652 01/26/25  0518   * 130* 65*   BUN 23 16 11   CREATSERUM 1.23* 1.03* 0.88   EGFRCR 43* 53* 64   CA 9.7 10.1 9.8    142 139   K 3.5 3.9  3.9 2.9*    108 107   CO2 27.0 24.0 22.0         Imaging:   CT BRAIN OR HEAD (CPT=70450)    Result Date: 1/25/2025  CONCLUSION:  1. No intracranial hemorrhage. 2. Acute left parietal occipital cortical infarct and posterior temporal cortical infarct.  Other acute infarcts seen on the recent MRI are not detectable with certainty. 3. Right frontal craniotomy and para falcine aneurysm clipping.  New line large vessel intracranial atherosclerosis.     Dictated by (CST): Michael Gonzalez MD on 1/25/2025 at 7:43 PM     Finalized by (CST): Michael Gonzalez MD on 1/25/2025 at 7:48 PM          MRI BRAIN (CPT=70551)    Result Date: 1/25/2025  CONCLUSION:  1. Interval development of multiple acute infarcts with largest in the left parietal occipital region and left posterior temporal lobe..  Other smaller infarcts in the bilateral posterior frontal lobes, right paramedian posterior parietal lobe, right occipital lobe, left anterior frontal lobe. 2. Recent left thalamic lacunar infarct is unchanged.  3. One of recent cerebellar lacunar infarcts is much less conspicuous and others no longer restrict diffusion.  Other chronic cerebellar lacunar infarcts. 4. Post right frontal craniotomy for aneurysm clipping.    Dictated by (CST): Michael Gonzalez MD on 1/25/2025 at 6:22 PM     Finalized by (CST): Michael Gonzalez MD on 1/25/2025 at 6:31 PM          CTA BRAIN + CTA CAROTIDS (CPT=70496/80410)    Result Date: 1/25/2025  CONCLUSION:  1. No large vessel occlusion, hemodynamically significant stenosis, dissection, AVM or aneurysm. 2. Wall 3. Right frontal craniotomy for aneurysm clipping. 4. Patent proximal left subclavian artery stent. 5. Changes of chronic small vessel disease in both cerebral hemispheres. 6. Irregular atherosclerotic plaque in the thoracic aorta. 7. Advanced emphysema with pleural parenchymal scarring.     Dictated by (CST): Michael Gonzalez MD on 1/25/2025 at 1:31 PM     Finalized by (CST): Michael Gonzalez MD on 1/25/2025 at 1:49 PM          XR CHEST AP PORTABLE  (CPT=71045)    Result Date: 1/25/2025  CONCLUSION:   Left internal jugular central venous catheter tip over the right atrium.  Metallic stent over the upper mediastinum, new from prior.  Correlate with recent intervention.  Background of pulmonary fibrosis.  Small left pleural effusion with left basilar opacity which may represent subsegmental atelectasis and/or other superimposed process.  A preliminary  report was issued by [facility]eradiology [facility]. There are no clinically actionable discrepancies.    Dictated by (CST): Gisselle Mendez MD on 1/25/2025 at 9:08 AM     Finalized by (CST): Gisselle Mendez MD on 1/25/2025 at 9:10 AM          CT ABDOMEN+PELVIS(CPT=74176)    Result Date: 1/24/2025  CONCLUSION:  1. Small amount of ill-defined hematoma in left inguinal femoral region extending into left hemipelvis with slight haziness to the fat in the iliac fossa.  No large hematoma. 2. Contrast excretion into normal renal collecting systems and contrast distended urinary bladder from the recent angiogram. 3. Emphysema and scarring in both lungs.    Dictated by (CST): Michael Gonzalez MD on 1/24/2025 at 11:42 PM     Finalized by (CST): Michael Gonzalez MD on 1/24/2025 at 11:57 PM          US VENOUS DOPPLER LEG BILAT - DIAG IMG (CPT=93970)    Result Date: 1/24/2025  CONCLUSION:  Negative for sonographic evidence of deep venous thrombosis in either lower extremity.    Dictated by (CST): Sharath Gruber MD on 1/24/2025 at 7:21 PM     Finalized by (CST): Sharath Gruber MD on 1/24/2025 at 7:22 PM          CT BRAIN OR HEAD (CPT=70450)    Result Date: 1/24/2025  CONCLUSION:  1. Subarachnoid hemorrhage in the left frontal high convexity in bilateral occipital lobes with mild cerebral edema. Continued surveillance is recommended.  2. Postoperative changes of frontal craniotomy and anterior parafalcine aneurysm clipping.  3. Senescent changes of parenchymal volume loss with sequela of chronic microvascular ischemic disease.  4. There is large vessel atherosclerosis involving the anterior and posterior intracranial circulations.  5. Lesser incidental findings as above.      Results of this examination were conveyed to the patient's physician, Dr. Ronald March, by Dr. Gruber at 1838 on 01/24/2025.   Dictated by (CST): Sharath Gruber MD on 1/24/2025 at 6:35 PM     Finalized by (CST): Sharath Gruber MD on 1/24/2025 at 6:41  PM                 Assessment and Plan:   #Left Cerebellar Infarcts  #SAH  - pt with new sudden onset neuro changes  - 2 small cerebellar infarcts seen on admission MRI  -Patient already took 3 baby aspirin's prior to coming to ED   - CTA negative for LVO, but showing small subsegmental Pes  - Stroke alert called 1/19 -> stroke CT non-acute, MRI showing small CVA in L thalamus   - f/u lipid panel, A1c, TSH, ECHO   - Neuro on consult  -Continue statin  -Brillinta resumed 01/26/2025  -high risk morbidity/mortality given her history of GI bleed and now need for antiplatelet therapy and potential anticoagulation in the future. Family is aware  -allow for HTN. Maintain < 180/110  -Vascular surgery on consult  -s/p stent placement 10/24/2025  -CT head reviewed, repeat CT pending  -Appreciate further neuro and vascular surgery input  -PT/OT  -supportive care    #Dizziness  Improved   -Likely in setting of above  -Meclizine prn   -Neurology consulted   -continue brilinta and statin    #Anemia  -in the setting of PE and aortic arch thrombus, the decision was made to resume anticoagulation despite the patients history of bleeding AVMs  -Hb appears to be stable at this time 8.4 -> 7.8 -> 7.7  -Transfused 1U pRBC, 1U platelets  -monitor H&H, transfuse as indicated  -GI on consult   Start octreotide TID   Resume antiplatelet therapy if needed by cardiology/vascular/neurology  Possible endoscopic eval vs transfer to a tertiary care center for balloon enteroscopy and colonoscopy   Close opt follow up     #PE   -Patient is hemodynamically stable, EKG NSR and troponin not elevated.  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 Already took 3 baby aspirins prior to admission  -Previously had IVC filter placed in March 2024 for PE at that time (removed June 2024).   -Needs eval for hypercoagulable state. Risk factors of recent car ride, overall sedentary state, breast cancer.  Sees Dr. Mohamud Hem in clinic for anemia, needs further follow up   -given current presentation the patient was restarted on AC, close Hb monitoring     #Descending aortic arch thrombus   -Admission CTA chest revealing mural thrombus throughout the thoracic and abdominal aorta, chronic per radiology  -Vascular surgery on consult  -planning for angiogram 01/24/2025  -cont brilinta and statin for now      #HTN  -Hold home antihypertensives to allow for permissive hypertension poststroke     #Klebsiella UTI  -nitrofurantoin initiated in ED, UCx reviewed - nitrofurantoin resistant  -started on IV Cefepime  -deescalate abx as indicated    #Constipation  -bowel regimen ordered     Chronic Medical Problems  HTN  HLD  DM  COPD/?sarcoidosis   Rheumatoid Arthritis  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

## 2025-01-26 NOTE — PLAN OF CARE
PT HGB stable overnight. PT experienced Headache and neuro changes at 0100. Dr. Kang was notified. No interventions except pain management and to skip the 0200 neuro exam to allow the pt to try and get some rest.   Problem: HEMATOLOGIC - ADULT  Goal: Free from bleeding injury  Description: (Example usage: patient with low platelets)  INTERVENTIONS:  - Avoid intramuscular injections, enemas and rectal medication administration  - Ensure safe mobilization of patient  - Hold pressure on venipuncture sites to achieve adequate hemostasis  - Assess for signs and symptoms of internal bleeding  - Monitor lab trends  - Patient is to report abnormal signs of bleeding to staff  - Avoid use of toothpicks and dental floss  - Use electric shaver for shaving  - Use soft bristle tooth brush  - Limit straining and forceful nose blowing  Outcome: Progressing     Problem: Patient Centered Care  Goal: Patient preferences are identified and integrated in the patient's plan of care  Description: Interventions:  - What would you like us to know as we care for you? From home with daughter   - Provide timely, complete, and accurate information to patient/family  - Incorporate patient and family knowledge, values, beliefs, and cultural backgrounds into the planning and delivery of care  - Encourage patient/family to participate in care and decision-making at the level they choose  - Honor patient and family perspectives and choices  Outcome: Progressing     Problem: Diabetes/Glucose Control  Goal: Glucose maintained within prescribed range  Description: INTERVENTIONS:  - Monitor Blood Glucose as ordered  - Assess for signs and symptoms of hyperglycemia and hypoglycemia  - Administer ordered medications to maintain glucose within target range  - Assess barriers to adequate nutritional intake and initiate nutrition consult as needed  - Instruct patient on self management of diabetes  Outcome: Progressing     Problem: Patient/Family  Goals  Goal: Patient/Family Long Term Goal  Description: Patient's Long Term Goal: discharge from hospital     Interventions:  - monitor vital signs   - monitor blood glucose levels  - monitor appropriate labs  - pain management   - administer medications per order  - follow MD orders  - diagnostics per order  - update and inform patient and family on plan of care  - discharge planning  - See additional Care Plan goals for specific interventions  Outcome: Progressing  Goal: Patient/Family Short Term Goal  Description: Patient's Short Term Goal: manage pain     Interventions:   - monitor vital signs   - monitor blood glucose levels  - monitor appropriate labs  - pain management   - administer medications per order  - follow MD orders  - diagnostics per order  - update and inform patient and family on plan of care  - See additional Care Plan goals for specific interventions  Outcome: Progressing     Problem: CARDIOVASCULAR - ADULT  Goal: Maintains optimal cardiac output and hemodynamic stability  Description: INTERVENTIONS:  - Monitor vital signs, rhythm, and trends  - Monitor for bleeding, hypotension and signs of decreased cardiac output  - Evaluate effectiveness of vasoactive medications to optimize hemodynamic stability  - Monitor arterial and/or venous puncture sites for bleeding and/or hematoma  - Assess quality of pulses, skin color and temperature  - Assess for signs of decreased coronary artery perfusion - ex. Angina  - Evaluate fluid balance, assess for edema, trend weights  Outcome: Progressing  Goal: Absence of cardiac arrhythmias or at baseline  Description: INTERVENTIONS:  - Continuous cardiac monitoring, monitor vital signs, obtain 12 lead EKG if indicated  - Evaluate effectiveness of antiarrhythmic and heart rate control medications as ordered  - Initiate emergency measures for life threatening arrhythmias  - Monitor electrolytes and administer replacement therapy as ordered  Outcome: Progressing      Problem: METABOLIC/FLUID AND ELECTROLYTES - ADULT  Goal: Glucose maintained within prescribed range  Description: INTERVENTIONS:  - Monitor Blood Glucose as ordered  - Assess for signs and symptoms of hyperglycemia and hypoglycemia  - Administer ordered medications to maintain glucose within target range  - Assess barriers to adequate nutritional intake and initiate nutrition consult as needed  - Instruct patient on self management of diabetes  Outcome: Progressing  Goal: Electrolytes maintained within normal limits  Description: INTERVENTIONS:  - Monitor labs and rhythm and assess patient for signs and symptoms of electrolyte imbalances  - Administer electrolyte replacement as ordered  - Monitor response to electrolyte replacements, including rhythm and repeat lab results as appropriate  - Fluid restriction as ordered  - Instruct patient on fluid and nutrition restrictions as appropriate  Outcome: Progressing  Goal: Hemodynamic stability and optimal renal function maintained  Description: INTERVENTIONS:  - Monitor labs and assess for signs and symptoms of volume excess or deficit  - Monitor intake, output and patient weight  - Monitor urine specific gravity, serum osmolarity and serum sodium as indicated or ordered  - Monitor response to interventions for patient's volume status, including labs, urine output, blood pressure (other measures as available)  - Encourage oral intake as appropriate  - Instruct patient on fluid and nutrition restrictions as appropriate  Outcome: Progressing     Problem: PAIN - ADULT  Goal: Verbalizes/displays adequate comfort level or patient's stated pain goal  Description: INTERVENTIONS:  - Encourage pt to monitor pain and request assistance  - Assess pain using appropriate pain scale  - Administer analgesics based on type and severity of pain and evaluate response  - Implement non-pharmacological measures as appropriate and evaluate response  - Consider cultural and social influences  on pain and pain management  - Manage/alleviate anxiety  - Utilize distraction and/or relaxation techniques  - Monitor for opioid side effects  - Notify MD/LIP if interventions unsuccessful or patient reports new pain  - Anticipate increased pain with activity and pre-medicate as appropriate  Outcome: Progressing     Problem: SAFETY ADULT - FALL  Goal: Free from fall injury  Description: INTERVENTIONS:  - Assess pt frequently for physical needs  - Identify cognitive and physical deficits and behaviors that affect risk of falls.  - Frannie fall precautions as indicated by assessment.  - Educate pt/family on patient safety including physical limitations  - Instruct pt to call for assistance with activity based on assessment  - Modify environment to reduce risk of injury  - Provide assistive devices as appropriate  - Consider OT/PT consult to assist with strengthening/mobility  - Encourage toileting schedule  Outcome: Progressing     Problem: DISCHARGE PLANNING  Goal: Discharge to home or other facility with appropriate resources  Description: INTERVENTIONS:  - Identify barriers to discharge w/pt and caregiver  - Include patient/family/discharge partner in discharge planning  - Arrange for needed discharge resources and transportation as appropriate  - Identify discharge learning needs (meds, wound care, etc)  - Arrange for interpreters to assist at discharge as needed  - Consider post-discharge preferences of patient/family/discharge partner  - Complete POLST form as appropriate  - Assess patient's ability to be responsible for managing their own health  - Refer to Case Management Department for coordinating discharge planning if the patient needs post-hospital services based on physician/LIP order or complex needs related to functional status, cognitive ability or social support system  Outcome: Progressing     Problem: SKIN/TISSUE INTEGRITY - ADULT  Goal: Skin integrity remains intact  Description:  INTERVENTIONS  - Assess and document risk factors for pressure ulcer development  - Assess and document skin integrity  - Monitor for areas of redness and/or skin breakdown  - Initiate interventions, skin care algorithm/standards of care as needed  Outcome: Progressing     Problem: Impaired Swallowing  Goal: Minimize aspiration risk  Description: Interventions:  - Patient should be alert and upright for all feedings (90 degrees preferred)  - Offer food and liquids at a slow rate  - No straws  - Encourage small bites of food and small sips of liquid  - Offer pills one at a time, crush or deliver with applesauce as needed  - Discontinue feeding and notify MD (or speech pathologist) if coughing or persistent throat clearing or wet/gurgly vocal quality is noted  Outcome: Progressing     Problem: NEUROLOGICAL - ADULT  Goal: Achieves stable or improved neurological status  Description: INTERVENTIONS  - Assess for and report changes in neurological status  - Initiate measures to prevent increased intracranial pressure  - Maintain blood pressure and fluid volume within ordered parameters to optimize cerebral perfusion and minimize risk of hemorrhage  - Monitor temperature, glucose, and sodium. Initiate appropriate interventions as ordered  Outcome: Not Progressing  Goal: Achieves maximal functionality and self care  Description: INTERVENTIONS  - Monitor swallowing and airway patency with patient fatigue and changes in neurological status  - Encourage and assist patient to increase activity and self care with guidance from PT/OT  - Encourage visually impaired, hearing impaired and aphasic patients to use assistive/communication devices  Outcome: Not Progressing     Problem: MUSCULOSKELETAL - ADULT  Goal: Return mobility to safest level of function  Description: INTERVENTIONS:  - Assess patient stability and activity tolerance for standing, transferring and ambulating w/ or w/o assistive devices  - Assist with transfers and  ambulation using safe patient handling equipment as needed  - Ensure adequate protection for wounds/incisions during mobilization  - Obtain PT/OT consults as needed  - Advance activity as appropriate  - Communicate ordered activity level and limitations with patient/family  Outcome: Not Progressing

## 2025-01-26 NOTE — PROGRESS NOTES
[facility]  Vascular Surgery Progress Note    Chester Bautista Patient Status:  Inpatient    1940 MRN [id number]   Location [facility] 2W/SW Attending Bonny Carranza MD   Hosp Day # 8 PCP Heike Sorto MD     Objective:   Temp: 96.7 °F (35.9 °C)  Pulse: 86  Resp: 27  BP: 104/77    Intake/Output:     Intake/Output Summary (Last 24 hours) at 2025 1648  Last data filed at 2025 1300  Gross per 24 hour   Intake 3443.92 ml   Output 600 ml   Net 2843.92 ml       Events Yesterday/Overnight:  Visited the patient at the bedside. No major complaints. She had some headache last night which improved after some pain medications  Exam:  She is much alert and oriented compared to yesterday. Her She has good strength in her right upper extremity.  Some weakness at her right lower proximal motor compartment.   Her most recent MRI with multiple small strokes at her left parietal, Temporal and bilateral frontal cortex. She was started back on Berlinta today    Impression/Plan:  84 year old female with past medical history of COPD, DM, HTN and gastric AVM complicated with recurrent GI bleed who presented with left cerebellar stroke on Saturday, 2025.  Her hospital course was complicated with another stroke identified in the left thalamus on MRI on 2025.  She is S/P L SCA Stent. Post-operative course was notable for severe headache which was worked up with a CT head being concerning for SAH. However the subsequent CT scan ruled out SAH, but were notable for new small ischemic strokes. MRI on 2025 was notable for left parietal, occipital and temporal regions as well as bilateral frontal cortext. Patient had altered mental status on 2025, however today is alert and oriented with motor/sensory function close to her baseline. She was started on her Berlinta.     Plan:  - Will continue monitoring in the ICU  - Echo on 2025 is notable for mild hypokinesia of the anterolaterl L  ventricle wall. Recommend cardiology consult to evaluate the cardiac origin of  the B cerebral ischemic strokes   - Vascular surgery will continue to follow up  - Please call with questions or concerns    Medications:    Current Facility-Administered Medications:     glucose (Dex4) 15 GM/59ML oral liquid 15 g, 15 g, Oral, Q15 Min PRN **OR** glucose (Glutose) 40% oral gel 15 g, 15 g, Oral, Q15 Min PRN **OR** glucose-vitamin C (Dex-4) chewable tab 4 tablet, 4 tablet, Oral, Q15 Min PRN **OR** dextrose 50% injection 50 mL, 50 mL, Intravenous, Q15 Min PRN **OR** glucose (Dex4) 15 GM/59ML oral liquid 30 g, 30 g, Oral, Q15 Min PRN **OR** glucose (Glutose) 40% oral gel 30 g, 30 g, Oral, Q15 Min PRN **OR** glucose-vitamin C (Dex-4) chewable tab 8 tablet, 8 tablet, Oral, Q15 Min PRN    dextrose 5%-sodium chloride 0.9% infusion, , Intravenous, Continuous    ticagrelor (Brilinta) tab 90 mg, 90 mg, Oral, Q12H    lidocaine-menthol 4-1 % patch 1 patch, 1 patch, Transdermal, Daily    labetalol (Trandate) 5 mg/mL injection 10 mg, 10 mg, Intravenous, Q4H PRN    midazolam (Versed) 2 MG/2ML injection 2 mg, 2 mg, Intravenous, Once    octreoTIDe (SandoSTATIN) 50 mcg/mL injection 50 mcg, 50 mcg, Subcutaneous, Q8H    HYDROmorphone (Dilaudid) 1 MG/ML injection 0.2 mg, 0.2 mg, Intravenous, Q4H PRN    pantoprazole (Protonix) 40 mg in sodium chloride 0.9% PF 10 mL IV push, 40 mg, Intravenous, Q12H    sodium chloride 0.9% infusion, , Intravenous, Continuous    rosuvastatin (Crestor) tab 10 mg, 10 mg, Oral, Nightly    sennosides (Senokot) tab 8.6 mg, 8.6 mg, Oral, BID    ceFEPIme (Maxipime) 1 g in sodium chloride 0.9% 100 mL IVPB-MBP, 1 g, Intravenous, Q24H    cetirizine (ZyrTEC) tab 5 mg, 5 mg, Oral, Daily    acetaminophen (Tylenol Extra Strength) tab 500 mg, 500 mg, Oral, Q6H PRN    docusate sodium (Colace) cap 100 mg, 100 mg, Oral, BID    polyethylene glycol (PEG 3350) (Miralax) 17 g oral packet 17 g, 17 g, Oral, Daily PRN    magnesium  hydroxide (Milk of Magnesia) 400 MG/5ML oral suspension 30 mL, 30 mL, Oral, Daily PRN    bisacodyl (Dulcolax) 10 MG rectal suppository 10 mg, 10 mg, Rectal, Daily PRN    fleet enema (Fleet) rectal enema 133 mL, 1 enema, Rectal, Once PRN    meclizine (Antivert) tab 12.5 mg, 12.5 mg, Oral, TID PRN    acetaminophen (Tylenol) tab 650 mg, 650 mg, Oral, Q4H PRN **OR** acetaminophen (Tylenol) rectal suppository 650 mg, 650 mg, Rectal, Q4H PRN    hydrALAzine (Apresoline) 20 mg/mL injection 10 mg, 10 mg, Intravenous, Q2H PRN    ondansetron (Zofran) 4 MG/2ML injection 4 mg, 4 mg, Intravenous, Q6H PRN    metoclopramide (Reglan) 5 mg/mL injection 5 mg, 5 mg, Intravenous, Q8H PRN    acetaminophen (Tylenol) tab 650 mg, 650 mg, Oral, Q6H PRN    ondansetron (Zofran) 4 MG/2ML injection 4 mg, 4 mg, Intravenous, Q6H PRN    albuterol (Ventolin) (2.5 MG/3ML) 0.083% nebulizer solution 2.5 mg, 2.5 mg, Nebulization, Q4H PRN    carvedilol (Coreg) tab 12.5 mg, 12.5 mg, Oral, BID with meals    cyclobenzaprine (Flexeril) tab 10 mg, 10 mg, Oral, Nightly PRN    fluticasone-salmeterol (Advair Diskus) 250-50 MCG/ACT inhaler 1 puff, 1 puff, Inhalation, 2 times per day    gabapentin (Neurontin) cap 100 mg, 100 mg, Oral, Nightly    montelukast (Singulair) tab 10 mg, 10 mg, Oral, Nightly    predniSONE (Deltasone) tab 5 mg, 5 mg, Oral, Daily    losartan (Cozaar) tab 50 mg, 50 mg, Oral, Daily    hydroxychloroquine (Plaquenil) tab 300 mg, 300 mg, Oral, Daily    oxyCODONE immediate release tab 5 mg, 5 mg, Oral, Q6H PRN    insulin aspart (NovoLOG) 100 Units/mL FlexPen 1-11 Units, 1-11 Units, Subcutaneous, TID CC    insulin degludec (Tresiba) 100 units/mL flextouch 10 Units, 10 Units, Subcutaneous, Nightly    Laboratory/Data:    LABS:  Recent Labs   Lab 01/22/25  0659 01/23/25  0702 01/24/25  1519 01/25/25  0332 01/25/25  0900 01/25/25  1604 01/25/25  2357 01/26/25  0849   WBC 11.1* 12.1* 13.4* 14.9*  --  14.5*  --   --    HGB 7.8* 7.7* 6.4* 8.0* 7.9* 7.7*  7.4* 7.6*   MCV 78.6* 82.6 79.9* 79.6*  --  80.5  --   --    .0 344.0 353.0 375.0  375.0  --  336.0  --   --    INR  --   --   --  1.04  --   --   --   --        Recent Labs   Lab 01/22/25  0659 01/23/25  0702 01/24/25  1519 01/25/25  0652 01/26/25  0518    139 141 142 139   K 3.7 4.0  4.0 3.5 3.9  3.9 2.9*    106 105 108 107   CO2 26.0 23.0 27.0 24.0 22.0   BUN 23 19 23 16 11   CREATSERUM 1.25* 1.24* 1.23* 1.03* 0.88   * 193* 261* 130* 65*   CA 10.3 10.3 9.7 10.1 9.8   MG 1.6 1.5* 1.5* 2.1 1.6   PHOS 4.0  --   --   --   --      Recent Labs   Lab 01/25/25  0332   PTP 14.3   INR 1.04   PTT 26.6     Recent Labs   Lab 01/21/25  0649 01/22/25  0659 01/23/25  0702 01/24/25  1519 01/25/25  0652 01/26/25  0518   ALT <7*  --  <7* <7* 9* <7*   AST 16  --  18 18 18 23   ALB 3.4 3.5 3.6 3.2 3.6 3.4       Lisandra Long MD  [facility], Division of Vascular Surgery  1/26/2025  4:48 PM

## 2025-01-26 NOTE — PROGRESS NOTES
[facility]  Vascular Surgery Progress Note    Chester Bautista Patient Status:  Inpatient    1940 MRN [id number]   Location [facility] 2W/SW Attending Bonny Carranza MD   Hosp Day # 7 PCP Heike Sorto MD     Objective:   Temp: 97.2 °F (36.2 °C)  Pulse: 83  Resp: 22  BP: 164/67    Intake/Output:     Intake/Output Summary (Last 24 hours) at 2025  Last data filed at 2025 1700  Gross per 24 hour   Intake 2666.07 ml   Output 250 ml   Net 2416.07 ml       Events Yesterday/Overnight:  The patient had a deterioration of her speech this morning and a stat MRI was ordered by neurology that revealed interval  infarcts in her left parietal/occipital region, left posterior temporal lobe, small medial occipital lobe infarct,   L>R frontal lobe/motor cortex, and left frontal lobe. She has infarct in her anterior and posterior circulation.   She is not on anticoagulation due to her bleeding AVM and brilinta was stopped after she had tarry stools.     Exam:  The patient is verbal but shows confusion    Impression/Plan:  The patient has developed post procedure strokes. The stent is patent. It is difficult to point to the exact cause of her strokes given her multiple pre-existing conditions that mai very complicated but it may also be due to the procedure with manipulation of the thoracic aorta. At this point, will have to await the ability to anticoagulate her and place her back on antiplatelet therapy.  I have discussed this with her daughter at bedside and answered her questions.     Medications:    Current Facility-Administered Medications:     midazolam (Versed) 2 MG/2ML injection 2 mg, 2 mg, Intravenous, Once    octreoTIDe (SandoSTATIN) 50 mcg/mL injection 50 mcg, 50 mcg, Subcutaneous, Q8H    sodium chloride 0.9% infusion, , Intravenous, Continuous    HYDROmorphone (Dilaudid) 1 MG/ML injection 0.2 mg, 0.2 mg, Intravenous, Q4H PRN    pantoprazole (Protonix) 40 mg in sodium  chloride 0.9% PF 10 mL IV push, 40 mg, Intravenous, Q12H    sodium chloride 0.9% infusion, , Intravenous, Continuous    rosuvastatin (Crestor) tab 10 mg, 10 mg, Oral, Nightly    sennosides (Senokot) tab 8.6 mg, 8.6 mg, Oral, BID    LORazepam (Ativan) tab 0.5 mg, 0.5 mg, Oral, PRN    [Held by provider] ticagrelor (Brilinta) tab 90 mg, 90 mg, Oral, Q12H    ceFEPIme (Maxipime) 1 g in sodium chloride 0.9% 100 mL IVPB-MBP, 1 g, Intravenous, Q24H    cetirizine (ZyrTEC) tab 5 mg, 5 mg, Oral, Daily    acetaminophen (Tylenol Extra Strength) tab 500 mg, 500 mg, Oral, Q6H PRN    docusate sodium (Colace) cap 100 mg, 100 mg, Oral, BID    polyethylene glycol (PEG 3350) (Miralax) 17 g oral packet 17 g, 17 g, Oral, Daily PRN    magnesium hydroxide (Milk of Magnesia) 400 MG/5ML oral suspension 30 mL, 30 mL, Oral, Daily PRN    bisacodyl (Dulcolax) 10 MG rectal suppository 10 mg, 10 mg, Rectal, Daily PRN    fleet enema (Fleet) rectal enema 133 mL, 1 enema, Rectal, Once PRN    meclizine (Antivert) tab 12.5 mg, 12.5 mg, Oral, TID PRN    acetaminophen (Tylenol) tab 650 mg, 650 mg, Oral, Q4H PRN **OR** acetaminophen (Tylenol) rectal suppository 650 mg, 650 mg, Rectal, Q4H PRN    hydrALAzine (Apresoline) 20 mg/mL injection 10 mg, 10 mg, Intravenous, Q2H PRN    ondansetron (Zofran) 4 MG/2ML injection 4 mg, 4 mg, Intravenous, Q6H PRN    metoclopramide (Reglan) 5 mg/mL injection 5 mg, 5 mg, Intravenous, Q8H PRN    acetaminophen (Tylenol) tab 650 mg, 650 mg, Oral, Q6H PRN    ondansetron (Zofran) 4 MG/2ML injection 4 mg, 4 mg, Intravenous, Q6H PRN    albuterol (Ventolin) (2.5 MG/3ML) 0.083% nebulizer solution 2.5 mg, 2.5 mg, Nebulization, Q4H PRN    carvedilol (Coreg) tab 12.5 mg, 12.5 mg, Oral, BID with meals    cyclobenzaprine (Flexeril) tab 10 mg, 10 mg, Oral, Nightly PRN    fluticasone-salmeterol (Advair Diskus) 250-50 MCG/ACT inhaler 1 puff, 1 puff, Inhalation, 2 times per day    gabapentin (Neurontin) cap 100 mg, 100 mg, Oral, Nightly     montelukast (Singulair) tab 10 mg, 10 mg, Oral, Nightly    predniSONE (Deltasone) tab 5 mg, 5 mg, Oral, Daily    losartan (Cozaar) tab 50 mg, 50 mg, Oral, Daily    hydroxychloroquine (Plaquenil) tab 300 mg, 300 mg, Oral, Daily    oxyCODONE immediate release tab 5 mg, 5 mg, Oral, Q6H PRN    insulin aspart (NovoLOG) 100 Units/mL FlexPen 1-11 Units, 1-11 Units, Subcutaneous, TID CC    insulin degludec (Tresiba) 100 units/mL flextouch 10 Units, 10 Units, Subcutaneous, Nightly    Laboratory/Data:    LABS:  Recent Labs   Lab 01/22/25  0659 01/23/25  0702 01/24/25  1519 01/25/25  0332 01/25/25  0900 01/25/25  1604   WBC 11.1* 12.1* 13.4* 14.9*  --  14.5*   HGB 7.8* 7.7* 6.4* 8.0* 7.9* 7.7*   MCV 78.6* 82.6 79.9* 79.6*  --  80.5   .0 344.0 353.0 375.0  375.0  --  336.0   INR  --   --   --  1.04  --   --        Recent Labs   Lab 01/21/25  0649 01/22/25  0659 01/23/25  0702 01/24/25  1519 01/25/25  0652    139 139 141 142   K 4.3 3.7 4.0  4.0 3.5 3.9  3.9    107 106 105 108   CO2 26.0 26.0 23.0 27.0 24.0   BUN 20 23 19 23 16   CREATSERUM 1.23* 1.25* 1.24* 1.23* 1.03*   * 185* 193* 261* 130*   CA 10.0 10.3 10.3 9.7 10.1   MG 1.4* 1.6 1.5* 1.5* 2.1   PHOS  --  4.0  --   --   --      Recent Labs   Lab 01/25/25  0332   PTP 14.3   INR 1.04   PTT 26.6     Recent Labs   Lab 01/21/25  0649 01/22/25  0659 01/23/25  0702 01/24/25  1519 01/25/25  0652   ALT <7*  --  <7* <7* 9*   AST 16  --  18 18 18   ALB 3.4 3.5 3.6 3.2 3.6     No results for input(s): \"TROP\" in the last 168 hours.  Lab Results   Component Value Date    ANASCRN Negative 08/05/2022     No results for input(s): \"PCACT\", \"PSACT\", \"AT3ACT\", \"HIPAB\", \"PATHI\", \"STALA\", \"DRVVTRATIO\", \"DRVVT\", \"STACLOT\", \"CARIGG\", \"P4IB0LIDHT\", \"E6HO2URFSG\", \"RA\", \"HAVIGM\", \"HBCIGM\", \"HCVAB\", \"HBSAG\", \"HBCAB\", \"HBVDNAINTERP\", \"ANAS\", \"C3\", \"C4\" in the last 168 hours.    Samer F. Najjar, MD  Vascular Surgery  [facility]  1/25/2025  7:36 PM

## 2025-01-27 ENCOUNTER — APPOINTMENT (OUTPATIENT)
Dept: GENERAL RADIOLOGY | Facility: HOSPITAL | Age: 85
End: 2025-01-27
Attending: NURSE PRACTITIONER
Payer: MEDICARE

## 2025-01-27 ENCOUNTER — APPOINTMENT (OUTPATIENT)
Age: 85
End: 2025-01-27
Attending: INTERNAL MEDICINE
Payer: MEDICARE

## 2025-01-27 ENCOUNTER — APPOINTMENT (OUTPATIENT)
Dept: ULTRASOUND IMAGING | Facility: HOSPITAL | Age: 85
End: 2025-01-27
Attending: Other
Payer: MEDICARE

## 2025-01-27 LAB
ALBUMIN SERPL-MCNC: 3.2 G/DL (ref 3.2–4.8)
ALBUMIN/GLOB SERPL: 1 {RATIO} (ref 1–2)
ALP LIVER SERPL-CCNC: 76 U/L
ALT SERPL-CCNC: <7 U/L
ANION GAP SERPL CALC-SCNC: 9 MMOL/L (ref 0–18)
AST SERPL-CCNC: 20 U/L (ref ?–34)
BASOPHILS # BLD AUTO: 0.06 X10(3) UL (ref 0–0.2)
BASOPHILS NFR BLD AUTO: 0.3 %
BILIRUB SERPL-MCNC: 0.4 MG/DL (ref 0.2–1.1)
BLOOD TYPE BARCODE: 7300
BUN BLD-MCNC: 8 MG/DL (ref 9–23)
BUN/CREAT SERPL: 9 (ref 10–20)
CALCIUM BLD-MCNC: 9.1 MG/DL (ref 8.7–10.4)
CHLORIDE SERPL-SCNC: 111 MMOL/L (ref 98–112)
CO2 SERPL-SCNC: 23 MMOL/L (ref 21–32)
CREAT BLD-MCNC: 0.89 MG/DL
DEPRECATED RDW RBC AUTO: 49.1 FL (ref 35.1–46.3)
EGFRCR SERPLBLD CKD-EPI 2021: 63 ML/MIN/1.73M2 (ref 60–?)
EOSINOPHIL # BLD AUTO: 0.5 X10(3) UL (ref 0–0.7)
EOSINOPHIL NFR BLD AUTO: 2.9 %
ERYTHROCYTE [DISTWIDTH] IN BLOOD BY AUTOMATED COUNT: 16.8 % (ref 11–15)
GLOBULIN PLAS-MCNC: 3.1 G/DL (ref 2–3.5)
GLUCOSE BLD-MCNC: 252 MG/DL (ref 70–99)
GLUCOSE BLDC GLUCOMTR-MCNC: 207 MG/DL (ref 70–99)
GLUCOSE BLDC GLUCOMTR-MCNC: 263 MG/DL (ref 70–99)
GLUCOSE BLDC GLUCOMTR-MCNC: 265 MG/DL (ref 70–99)
GLUCOSE BLDC GLUCOMTR-MCNC: 275 MG/DL (ref 70–99)
HCT VFR BLD AUTO: 23.3 %
HCT VFR BLD AUTO: 24.3 %
HCT VFR BLD AUTO: 25 %
HGB BLD-MCNC: 7.4 G/DL
HGB BLD-MCNC: 7.6 G/DL
HGB BLD-MCNC: 7.8 G/DL
IMM GRANULOCYTES # BLD AUTO: 0.11 X10(3) UL (ref 0–1)
IMM GRANULOCYTES NFR BLD: 0.6 %
LYMPHOCYTES # BLD AUTO: 1.65 X10(3) UL (ref 1–4)
LYMPHOCYTES NFR BLD AUTO: 9.6 %
MAGNESIUM SERPL-MCNC: 1.5 MG/DL (ref 1.6–2.6)
MCH RBC QN AUTO: 24.7 PG (ref 26–34)
MCHC RBC AUTO-ENTMCNC: 30.5 G/DL (ref 31–37)
MCV RBC AUTO: 81 FL
MONOCYTES # BLD AUTO: 1.52 X10(3) UL (ref 0.1–1)
MONOCYTES NFR BLD AUTO: 8.8 %
NEUTROPHILS # BLD AUTO: 13.37 X10 (3) UL (ref 1.5–7.7)
NEUTROPHILS # BLD AUTO: 13.37 X10(3) UL (ref 1.5–7.7)
NEUTROPHILS NFR BLD AUTO: 77.8 %
OSMOLALITY SERPL CALC.SUM OF ELEC: 303 MOSM/KG (ref 275–295)
PLATELET # BLD AUTO: 340 10(3)UL (ref 150–450)
POTASSIUM SERPL-SCNC: 3.5 MMOL/L (ref 3.5–5.1)
POTASSIUM SERPL-SCNC: 3.5 MMOL/L (ref 3.5–5.1)
POTASSIUM SERPL-SCNC: 4 MMOL/L (ref 3.5–5.1)
PROT SERPL-MCNC: 6.3 G/DL (ref 5.7–8.2)
RBC # BLD AUTO: 3 X10(6)UL
SODIUM SERPL-SCNC: 143 MMOL/L (ref 136–145)
UNIT VOLUME: 350 ML
WBC # BLD AUTO: 17.2 X10(3) UL (ref 4–11)

## 2025-01-27 PROCEDURE — 99232 SBSQ HOSP IP/OBS MODERATE 35: CPT | Performed by: STUDENT IN AN ORGANIZED HEALTH CARE EDUCATION/TRAINING PROGRAM

## 2025-01-27 PROCEDURE — 99233 SBSQ HOSP IP/OBS HIGH 50: CPT | Performed by: INTERNAL MEDICINE

## 2025-01-27 PROCEDURE — 99291 CRITICAL CARE FIRST HOUR: CPT | Performed by: OTHER

## 2025-01-27 PROCEDURE — 36598 INJ W/FLUOR EVAL CV DEVICE: CPT | Performed by: NURSE PRACTITIONER

## 2025-01-27 PROCEDURE — 93970 EXTREMITY STUDY: CPT | Performed by: OTHER

## 2025-01-27 RX ORDER — IOPAMIDOL 755 MG/ML
15 INJECTION, SOLUTION INTRAVASCULAR
Status: COMPLETED | OUTPATIENT
Start: 2025-01-27 | End: 2025-01-27

## 2025-01-27 RX ORDER — HEPARIN SODIUM 1000 [USP'U]/ML
INJECTION, SOLUTION INTRAVENOUS; SUBCUTANEOUS
Status: DISPENSED
Start: 2025-01-27 | End: 2025-01-28

## 2025-01-27 RX ORDER — POTASSIUM CHLORIDE 1.5 G/1.58G
40 POWDER, FOR SOLUTION ORAL EVERY 4 HOURS
Status: COMPLETED | OUTPATIENT
Start: 2025-01-27 | End: 2025-01-27

## 2025-01-27 RX ORDER — MAGNESIUM OXIDE 400 MG/1
800 TABLET ORAL ONCE
Status: COMPLETED | OUTPATIENT
Start: 2025-01-27 | End: 2025-01-27

## 2025-01-27 RX ORDER — FOLIC ACID 1 MG/1
TABLET ORAL
Qty: 6 CAPSULE | Refills: 1 | Status: SHIPPED | OUTPATIENT
Start: 2025-01-27

## 2025-01-27 NOTE — CONSULTS
[facility]  part of [facility]      Consult     Chester Bautista Patient Status:  Inpatient    1940 MRN [id number]   Location [facility] 2W/SW Attending Bonny Carranza MD   Hosp Day # 9 PCP Heike Sorto MD     Reason for Consultation: Port malfunction     Subjective:    Chester Bautista is a 84 year old female who is admitted with acute CVA. 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 She has a port placed by IR that is being used for outpatient iron infusions and blood draws (she is a hard stick). The port has not had blood return despite tpa. Port study today shows fibrin sheath around the distal 5 cm segment of the Port-A-Cath resulting in inability to aspirate.  The Port-A-Cath is in good position and can be used for injection as contrast/fluid is able to pass around the fibrin sheath into the right atrium.       History/Other:      Past Medical History:  Past Medical History:    Anxiety state    Cancer (HCC)    breast cancer 2018    Cerebellar stroke (HCC)    Chronic obstructive pulmonary disease, unspecified (HCC)    Chronic obstructive pulmonary disease, unspecified (HCC)    COPD (chronic obstructive pulmonary disease) (HCC)    Cystic thyroid nodule    Diabetes (HCC)    Diabetes mellitus (HCC)    Essential hypertension    Exposure to medical diagnostic radiation    High blood pressure    High cholesterol    History of blood transfusion    low hemoglobin    Osteoarthritis    PONV (postoperative nausea and vomiting)    Pulmonary embolism (HCC)    Pulmonary emphysema (HCC)    Rheumatoid arthritis (HCC)        Past Surgical History:   Past Surgical History:   Procedure Laterality Date    Cataract extraction w/  intraocular lens implant Bilateral 2017     in [facility]     Colonoscopy      Colonoscopy N/A 2022    Procedure: COLONOSCOPY;  Surgeon: Jose  Mikey THIBODEAUX MD;  Location: [facility] ENDOSCOPY    Colonoscopy N/A 06/15/2023    Procedure: COLONOSCOPY;  Surgeon: Mikey Garcia MD;  Location: [facility] ENDOSCOPY    Correct bunion,simple      right foot  1993    Egd  12/21/2023    Dr. Garcia; Duodenal AVM's x4 cauterized    Hysterectomy      1972, no abnormal Paps, due to heavy bleeding with fibroids.  Not sure if it had bilateral salpingo-oophorectomy.    Ir aneurysm repairm  2010    Computer assisted volumetric craniofomy with clipping of anterior cerebral artery.    Lumpectomy right      Radiation right      Rotator cuff repair      1993    Total knee replacement Right 2010    Total knee replacement Left 07/02/2015    Transoral incisionless fundoplication - internal  01/25/2012 Fredy fundoplication at [facility] Dr. Fournier.    Fredy fundoplication at [facility] Dr. Fournier.    Trigger finger release      left hand  2005    Yag capsulotomy - ou - both eyes Bilateral 09/2021    done in [place]       Social History:  reports that she quit smoking about 28 years ago. Her smoking use included cigarettes. She has never been exposed to tobacco smoke. She has never used smokeless tobacco. She reports that she does not drink alcohol and does not use drugs.    Family History:   Family History   Problem Relation Age of Onset    Heart Surgery Mother         Leaky valve at 41 y/o.     Prostate Cancer Brother     Cancer Brother     Diabetes Maternal Grandmother     Diabetes Paternal Aunt     Breast Cancer Self 78    Breast Cancer Niece         before 50    Macular degeneration Neg     Glaucoma Neg        Allergies: Allergies[1]    Medications:  Medications Ordered Prior to Encounter[2]    Review of Systems:   Review of systems was completed.  Pertinent positives and negatives noted in the HPI.    Objective:     /83   Pulse 95   Temp 97.9 °F (36.6 °C) (Temporal)   Resp 22   Ht 61\"   Wt 135 lb 9.6 oz (61.5 kg)   SpO2 95%   BMI 25.62 kg/m²    General: No acute distress. A/ox3.  Respiratory: Normal respiratory effort.  Cardiovascular: Rate regular.  Abdomen: No guarding.   Extremities: No edema. Right chest port - IVF presently infusing    Results:    Recent Labs   Lab 01/25/25  0332 01/25/25  0900 01/25/25  1604 01/25/25  2357 01/27/25  0031 01/27/25  0514 01/27/25  1009   RBC 3.18*  --  3.03*  --   --  3.00*  --    HGB 8.0*   < > 7.7*   < > 7.6* 7.4* 7.8*   HCT 25.3*   < > 24.4*   < > 23.3* 24.3* 25.0*   MCV 79.6*  --  80.5  --   --  81.0  --    MCH 25.2*  --  25.4*  --   --  24.7*  --    MCHC 31.6  --  31.6  --   --  30.5*  --    RDW 16.6*  --  16.8*  --   --  16.8*  --    NEPRELIM 11.71*  --  12.54*  --   --  13.37*  --    WBC 14.9*  --  14.5*  --   --  17.2*  --    .0  375.0  --  336.0  --   --  340.0  --     < > = values in this interval not displayed.       Recent Labs   Lab 01/25/25  0652 01/26/25  0518 01/26/25  1730 01/27/25  0514   * 65*  --  252*   BUN 16 11  --  8*   CREATSERUM 1.03* 0.88  --  0.89   EGFRCR 53* 64  --  63   CA 10.1 9.8  --  9.1    139  --  143   K 3.9  3.9 2.9* 3.8 3.5  3.5    107  --  111   CO2 24.0 22.0  --  23.0     XR PORT VENOUS W FLUORO INJ (JSL=37991)    Result Date: 1/27/2025  CONCLUSION: Fibrin sheath around the distal 5 cm segment of the Port-A-Cath resulting in inability to aspirate.  Please note, however, that the Port-A-Cath is in good position and can be used for injection as contrast/fluid is able to pass around the fibrin sheath into the right atrium.    Dictated by (CST): Parker Keane MD on 1/27/2025 at 1:52 PM     Finalized by (CST): Parker Keane MD on 1/27/2025 at 1:55 PM          MRI BRAIN (CPT=70551)    Result Date: 1/25/2025  CONCLUSION:  1. Interval development of multiple acute infarcts with largest in the left parietal occipital region and left posterior temporal lobe..  Other smaller infarcts in the bilateral posterior frontal lobes, right paramedian posterior  parietal lobe, right occipital lobe, left anterior frontal lobe. 2. Recent left thalamic lacunar infarct is unchanged.  3. One of recent cerebellar lacunar infarcts is much less conspicuous and others no longer restrict diffusion.  Other chronic cerebellar lacunar infarcts. 4. Post right frontal craniotomy for aneurysm clipping.    Dictated by (CST): Michael Gonzalez MD on 1/25/2025 at 6:22 PM     Finalized by (CST): Michael Gonzalez MD on 1/25/2025 at 6:31 PM           Assessment & Plan:    No blood return from port. Port is being used for blood draws as she is a hard stick and iron infusions.  Port study shows fibrin sheath around the distal 5 cm segment of the Port-A-Cath resulting in inability to aspirate.    Plan: port stripping and possible port exchange this upcoming Wednesday 1/29/25  She is on billinta which cannot be held given recent stenting. Discussed procedure, risks including but not limited to bleeding, benefits with patient and her daughter who agree to proceed.    Genia Fountain, APRN  1/27/2025                          [1]   Allergies  Allergen Reactions    Celebrex [Celecoxib] PALPITATIONS    Penicillins ITCHING and SWELLING    Amlodipine OTHER (SEE COMMENTS)     Calves Swelling     Metformin And Related UNKNOWN    Olmesartan UNKNOWN   [2]   Current Facility-Administered Medications on File Prior to Encounter   Medication Dose Route Frequency Provider Last Rate Last Admin    [COMPLETED] alteplase (Activase) injection 2 mg  2 mg Intravenous Once Eric Mohamud MD   2 mg at 01/16/25 1445    [COMPLETED] octreoTIDe (Sandostatin LAR) IM injection 20 mg  20 mg Intramuscular Once Zoila Mcqueen MD   20 mg at 01/03/25 1448    [COMPLETED] octreoTIDe (Sandostatin LAR) IM injection 20 mg  20 mg Intramuscular Once Zoila Mcqueen MD   20 mg at 11/29/24 1452    [COMPLETED] octreoTIDe (Sandostatin LAR) 30 MG IM injection             [COMPLETED] octreoTIDe (Sandostatin LAR) IM injection 20 mg  20 mg  Intramuscular Once Zoila Mcqueen MD   20 mg at 11/01/24 0952    [COMPLETED] alteplase (Activase) injection 2 mg  2 mg Intravenous Once Eric Mohamud MD   2 mg at 11/01/24 0944    [COMPLETED] iron sucrose (Venofer) 20 mg/mL injection 200 mg  200 mg Intravenous Once Eric Mohamud MD   Stopped at 10/31/24 1504     Current Outpatient Medications on File Prior to Encounter   Medication Sig Dispense Refill    acetaminophen 325 MG Oral Tab Take 2 tablets (650 mg total) by mouth every 8 (eight) hours.      rosuvastatin 20 MG Oral Tab Take 1 tablet (20 mg total) by mouth nightly. 90 tablet 3    magnesium oxide 400 MG Oral Tab Take 1 tablet (400 mg total) by mouth in the morning, at noon, and at bedtime. Per Dr. JENSEN Mata      valsartan 80 MG Oral Tab 1  1/2 tabs every 12 hrs. (Patient taking differently: Take 1 tablet (80 mg total) by mouth 2 (two) times daily. 1  1/2 tabs every 12 hrs.) 270 tablet 3    loratadine 10 MG Oral Tab TAKE 1 TABLET BY MOUTH EVERY DAY 30 tablet 1    Potassium Chloride ER 20 MEQ Oral Tab CR Take 40 mEq by mouth every morning. 60 tablet 1    hydroxychloroquine 200 MG Oral Tab Take 1.5 tablets (300 mg total) by mouth daily. 135 tablet 1    predniSONE 5 MG Oral Tab Take 1 tablet (5 mg total) by mouth daily. 90 tablet 1    famotidine 20 MG Oral Tab Take 1 tablet (20 mg total) by mouth 2 (two) times daily. Patient gets over the counter      glipiZIDE 5 MG Oral Tab Take 1 tablet (5 mg total) by mouth every morning before breakfast. 90 tablet 0    albuterol 108 (90 Base) MCG/ACT Inhalation Aero Soln TAKE 2 PUFFS BY MOUTH EVERY 4 TO 6 HOURS AS NEEDED 6.7 each 5    cyclobenzaprine 10 MG Oral Tab Take 1 tablet (10 mg total) by mouth nightly as needed. 90 tablet 1    insulin glargine (LANTUS SOLOSTAR) 100 UNIT/ML Subcutaneous Solution Pen-injector Inject 10 Units into the skin daily with dinner. (Patient taking differently: Inject 14 Units into the skin daily with dinner.) 3 mL 1    montelukast 10 MG Oral  Tab Take 1 tablet (10 mg total) by mouth nightly. 90 tablet 3    carvedilol 12.5 MG Oral Tab Take 1 tablet (12.5 mg total) by mouth 2 (two) times daily with meals. 180 tablet 3    fluticasone-salmeterol (WIXELA INHUB) 250-50 MCG/ACT Inhalation Aerosol Powder, Breath Activated Inhale 1 puff into the lungs every 12 (twelve) hours. 1 each 5    gabapentin 100 MG Oral Cap Take 1 capsule (100 mg total) by mouth nightly. 90 capsule 3    [] ciprofloxacin 500 MG Oral Tab Take 1 tablet (500 mg total) by mouth 2 (two) times daily for 7 days. 14 tablet 0    ipratropium 0.02 % Inhalation Solution Take 2.5 mL (500 mcg total) by nebulization 2 (two) times daily. 180 each 2    albuterol (2.5 MG/3ML) 0.083% Inhalation Nebu Soln Take 3 mL (2.5 mg total) by nebulization every 6 (six) hours as needed for Wheezing. 360 mL 3    [] doxycycline 100 MG Oral Cap Take 1 capsule (100 mg total) by mouth 2 (two) times daily for 7 days. (Patient not taking: Reported on 10/25/2024) 14 capsule 0    [] albuterol (2.5 MG/3ML) 0.083% Inhalation Nebu Soln Take 3 mL (2.5 mg total) by nebulization every 4 (four) hours as needed for Wheezing or Shortness of Breath. 30 each 0    BD PEN NEEDLE KADEN 2ND GEN 32G X 4 MM Does not apply Misc 1 EACH DAILY. USE DAILY WITH INSULIN 100 each 1    Blood Glucose Monitoring Suppl (BLOOD GLUCOSE SYSTEM GILLES) Does not apply Kit Dx. E11.9. Checks qam. 1 kit 0    Blood Glucose Monitoring Suppl w/Device Does not apply Kit Dx. E11.9. Checks qam. 1 kit 0    Blood Gluc Meter Disp-Strips Does not apply Device Dx. E11.9. Checks qam. 100 each 0    Accu-Chek FastClix Lancets Does not apply Misc Check sugar once daily as directed (E11.42) 100 each 2    Blood Glucose Monitoring Suppl (ONETOUCH VERIO) w/Device Does not apply Kit 1 each daily. Test 1x daily 1 kit 0    OneTouch Delica Lancets 33G Does not apply Misc 4 x daily 100 each 1    acetaminophen 500 MG Oral Tab Take 1 tablet (500 mg total) by mouth daily.

## 2025-01-27 NOTE — PROGRESS NOTES
[facility]  part of [facility]    Progress Note      Assessment and Plan:   1.  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    Recommendations:  1.  Anticoagulation when okay with GI  2.  As per neurology.  3.  Permissive hypercapnia    2.  GI bleed-has had multiple AVMs identified previously.  Hemoglobin now 7.8.    Recommendations: As per GI.  PPI.  Serial hemoglobin.    3.  Bilateral PE-had IVC filter at 1 time now removed.  Lower extremity ultrasound now negative for clot.    Recommendations: Conservative management with SCDs.  Will consider IVC filter replacement.  Will further discussed with the patient.    4.  Left groin hematoma-conservative management    5.  Chronic respiratory failure-patient has combined pulmonary fibrosis with emphysema.    Recommendations: Advair and conservative management at present    6.  Klebsiella UTI-on cefepime    Subjective:   Chester Bautista is a(n) 84 year old female who is breathing comfortably and moving better.    Objective:   Blood pressure 156/83, pulse 95, temperature 97.9 °F (36.6 °C), temperature source Temporal, resp. rate 22, height 5' 1\" (1.549 m), weight 135 lb 9.6 oz (61.5 kg), SpO2 95%.    Physical Exam alert white female  HEENT examination is unremarkable with pupils equal round and reactive to light and accommodation.   Neck without adenopathy, thyromegaly, JVD nor bruit.   Lungs clear to auscultation and percussion.  Cardiac regular rate and rhythm no murmur.   Abdomen nontender, without hepatosplenomegaly and no mass appreciable.   Extremities without clubbing cyanosis nor edema.   Neurologic grossly intact with symmetric tone and strength and reflex except for lingering right homonymous hemianopsia partial.  Skin without gross abnormality     Results:     Lab Results   Component Value Date    WBC 17.2 01/27/2025    HGB 7.8 01/27/2025    HCT 25.0 01/27/2025    .0 01/27/2025     CREATSERUM 0.89 01/27/2025    BUN 8 01/27/2025     01/27/2025    K 3.5 01/27/2025    K 3.5 01/27/2025     01/27/2025    CO2 23.0 01/27/2025     01/27/2025    CA 9.1 01/27/2025    ALB 3.2 01/27/2025    ALKPHO 76 01/27/2025    BILT 0.4 01/27/2025    TP 6.3 01/27/2025    AST 20 01/27/2025    ALT <7 01/27/2025    MG 1.5 01/27/2025       Chapin Parker MD  Medical Director, Critical Care, [facility]  Medical Director, [facility]  Pager: [phone]

## 2025-01-27 NOTE — PLAN OF CARE
Q2 neuros overnight stable. Pt ambulated to the bathroom multiple times overnight. One assist.   Problem: HEMATOLOGIC - ADULT  Goal: Free from bleeding injury  Description: (Example usage: patient with low platelets)  INTERVENTIONS:  - Avoid intramuscular injections, enemas and rectal medication administration  - Ensure safe mobilization of patient  - Hold pressure on venipuncture sites to achieve adequate hemostasis  - Assess for signs and symptoms of internal bleeding  - Monitor lab trends  - Patient is to report abnormal signs of bleeding to staff  - Avoid use of toothpicks and dental floss  - Use electric shaver for shaving  - Use soft bristle tooth brush  - Limit straining and forceful nose blowing  Outcome: Progressing     Problem: Patient Centered Care  Goal: Patient preferences are identified and integrated in the patient's plan of care  Description: Interventions:  - What would you like us to know as we care for you? From home with daughter   - Provide timely, complete, and accurate information to patient/family  - Incorporate patient and family knowledge, values, beliefs, and cultural backgrounds into the planning and delivery of care  - Encourage patient/family to participate in care and decision-making at the level they choose  - Honor patient and family perspectives and choices  Outcome: Progressing     Problem: Diabetes/Glucose Control  Goal: Glucose maintained within prescribed range  Description: INTERVENTIONS:  - Monitor Blood Glucose as ordered  - Assess for signs and symptoms of hyperglycemia and hypoglycemia  - Administer ordered medications to maintain glucose within target range  - Assess barriers to adequate nutritional intake and initiate nutrition consult as needed  - Instruct patient on self management of diabetes  Outcome: Progressing     Problem: Patient/Family Goals  Goal: Patient/Family Long Term Goal  Description: Patient's Long Term Goal: discharge from hospital     Interventions:  -  monitor vital signs   - monitor blood glucose levels  - monitor appropriate labs  - pain management   - administer medications per order  - follow MD orders  - diagnostics per order  - update and inform patient and family on plan of care  - discharge planning  - See additional Care Plan goals for specific interventions  Outcome: Progressing  Goal: Patient/Family Short Term Goal  Description: Patient's Short Term Goal: manage pain     Interventions:   - monitor vital signs   - monitor blood glucose levels  - monitor appropriate labs  - pain management   - administer medications per order  - follow MD orders  - diagnostics per order  - update and inform patient and family on plan of care  - See additional Care Plan goals for specific interventions  Outcome: Progressing     Problem: CARDIOVASCULAR - ADULT  Goal: Maintains optimal cardiac output and hemodynamic stability  Description: INTERVENTIONS:  - Monitor vital signs, rhythm, and trends  - Monitor for bleeding, hypotension and signs of decreased cardiac output  - Evaluate effectiveness of vasoactive medications to optimize hemodynamic stability  - Monitor arterial and/or venous puncture sites for bleeding and/or hematoma  - Assess quality of pulses, skin color and temperature  - Assess for signs of decreased coronary artery perfusion - ex. Angina  - Evaluate fluid balance, assess for edema, trend weights  Outcome: Progressing  Goal: Absence of cardiac arrhythmias or at baseline  Description: INTERVENTIONS:  - Continuous cardiac monitoring, monitor vital signs, obtain 12 lead EKG if indicated  - Evaluate effectiveness of antiarrhythmic and heart rate control medications as ordered  - Initiate emergency measures for life threatening arrhythmias  - Monitor electrolytes and administer replacement therapy as ordered  Outcome: Progressing     Problem: METABOLIC/FLUID AND ELECTROLYTES - ADULT  Goal: Glucose maintained within prescribed range  Description:  INTERVENTIONS:  - Monitor Blood Glucose as ordered  - Assess for signs and symptoms of hyperglycemia and hypoglycemia  - Administer ordered medications to maintain glucose within target range  - Assess barriers to adequate nutritional intake and initiate nutrition consult as needed  - Instruct patient on self management of diabetes  Outcome: Progressing  Goal: Electrolytes maintained within normal limits  Description: INTERVENTIONS:  - Monitor labs and rhythm and assess patient for signs and symptoms of electrolyte imbalances  - Administer electrolyte replacement as ordered  - Monitor response to electrolyte replacements, including rhythm and repeat lab results as appropriate  - Fluid restriction as ordered  - Instruct patient on fluid and nutrition restrictions as appropriate  Outcome: Progressing  Goal: Hemodynamic stability and optimal renal function maintained  Description: INTERVENTIONS:  - Monitor labs and assess for signs and symptoms of volume excess or deficit  - Monitor intake, output and patient weight  - Monitor urine specific gravity, serum osmolarity and serum sodium as indicated or ordered  - Monitor response to interventions for patient's volume status, including labs, urine output, blood pressure (other measures as available)  - Encourage oral intake as appropriate  - Instruct patient on fluid and nutrition restrictions as appropriate  Outcome: Progressing     Problem: NEUROLOGICAL - ADULT  Goal: Achieves stable or improved neurological status  Description: INTERVENTIONS  - Assess for and report changes in neurological status  - Initiate measures to prevent increased intracranial pressure  - Maintain blood pressure and fluid volume within ordered parameters to optimize cerebral perfusion and minimize risk of hemorrhage  - Monitor temperature, glucose, and sodium. Initiate appropriate interventions as ordered  Outcome: Progressing  Goal: Achieves maximal functionality and self care  Description:  INTERVENTIONS  - Monitor swallowing and airway patency with patient fatigue and changes in neurological status  - Encourage and assist patient to increase activity and self care with guidance from PT/OT  - Encourage visually impaired, hearing impaired and aphasic patients to use assistive/communication devices  Outcome: Progressing     Problem: PAIN - ADULT  Goal: Verbalizes/displays adequate comfort level or patient's stated pain goal  Description: INTERVENTIONS:  - Encourage pt to monitor pain and request assistance  - Assess pain using appropriate pain scale  - Administer analgesics based on type and severity of pain and evaluate response  - Implement non-pharmacological measures as appropriate and evaluate response  - Consider cultural and social influences on pain and pain management  - Manage/alleviate anxiety  - Utilize distraction and/or relaxation techniques  - Monitor for opioid side effects  - Notify MD/LIP if interventions unsuccessful or patient reports new pain  - Anticipate increased pain with activity and pre-medicate as appropriate  Outcome: Progressing     Problem: SAFETY ADULT - FALL  Goal: Free from fall injury  Description: INTERVENTIONS:  - Assess pt frequently for physical needs  - Identify cognitive and physical deficits and behaviors that affect risk of falls.  - [place] fall precautions as indicated by assessment.  - Educate pt/family on patient safety including physical limitations  - Instruct pt to call for assistance with activity based on assessment  - Modify environment to reduce risk of injury  - Provide assistive devices as appropriate  - Consider OT/PT consult to assist with strengthening/mobility  - Encourage toileting schedule  Outcome: Progressing     Problem: DISCHARGE PLANNING  Goal: Discharge to home or other facility with appropriate resources  Description: INTERVENTIONS:  - Identify barriers to discharge w/pt and caregiver  - Include patient/family/discharge partner in  discharge planning  - Arrange for needed discharge resources and transportation as appropriate  - Identify discharge learning needs (meds, wound care, etc)  - Arrange for interpreters to assist at discharge as needed  - Consider post-discharge preferences of patient/family/discharge partner  - Complete POLST form as appropriate  - Assess patient's ability to be responsible for managing their own health  - Refer to Case Management Department for coordinating discharge planning if the patient needs post-hospital services based on physician/LIP order or complex needs related to functional status, cognitive ability or social support system  Outcome: Progressing     Problem: SKIN/TISSUE INTEGRITY - ADULT  Goal: Skin integrity remains intact  Description: INTERVENTIONS  - Assess and document risk factors for pressure ulcer development  - Assess and document skin integrity  - Monitor for areas of redness and/or skin breakdown  - Initiate interventions, skin care algorithm/standards of care as needed  Outcome: Progressing     Problem: MUSCULOSKELETAL - ADULT  Goal: Return mobility to safest level of function  Description: INTERVENTIONS:  - Assess patient stability and activity tolerance for standing, transferring and ambulating w/ or w/o assistive devices  - Assist with transfers and ambulation using safe patient handling equipment as needed  - Ensure adequate protection for wounds/incisions during mobilization  - Obtain PT/OT consults as needed  - Advance activity as appropriate  - Communicate ordered activity level and limitations with patient/family  Outcome: Progressing     Problem: Impaired Swallowing  Goal: Minimize aspiration risk  Description: Interventions:  - Patient should be alert and upright for all feedings (90 degrees preferred)  - Offer food and liquids at a slow rate  - No straws  - Encourage small bites of food and small sips of liquid  - Offer pills one at a time, crush or deliver with applesauce as  needed  - Discontinue feeding and notify MD (or speech pathologist) if coughing or persistent throat clearing or wet/gurgly vocal quality is noted  Outcome: Progressing

## 2025-01-27 NOTE — PROGRESS NOTES
[facility]  part of [facility]    Progress Note    Chester Bautista Patient Status:  Inpatient    1940 MRN [id number]   Location [facility] 3W/SW Attending Richard Guerra MD   Hosp Day # 9 PCP Heike Sorto MD       Subjective:   Resting comfortably, NAD.  S/p stent placement on 2025.  Family present at the bedside, all questions and concerns addressed.    Objective:   Blood pressure 156/83, pulse 95, temperature 97.9 °F (36.6 °C), temperature source Temporal, resp. rate 22, height 5' 1\" (1.549 m), weight 135 lb 9.6 oz (61.5 kg), SpO2 95%.    General: AAF, NAD, ill appearing but improved  Respiratory: Clear to auscultation bilaterally. No wheezes. No rhonchi.  Cardiovascular: RRR  Abdomen: Soft, nontender, nondistended.  Neurologic: No focal neurological deficits.   Musculoskeletal: Moves all extremities.  Extremities: No edema or cyanosis.    Current Inpatient Medications:     Current Facility-Administered Medications:     potassium chloride (Klor-Con) 20 MEQ oral powder 40 mEq, 40 mEq, Oral, Q4H    glucose (Dex4) 15 GM/59ML oral liquid 15 g, 15 g, Oral, Q15 Min PRN **OR** glucose (Glutose) 40% oral gel 15 g, 15 g, Oral, Q15 Min PRN **OR** glucose-vitamin C (Dex-4) chewable tab 4 tablet, 4 tablet, Oral, Q15 Min PRN **OR** dextrose 50% injection 50 mL, 50 mL, Intravenous, Q15 Min PRN **OR** glucose (Dex4) 15 GM/59ML oral liquid 30 g, 30 g, Oral, Q15 Min PRN **OR** glucose (Glutose) 40% oral gel 30 g, 30 g, Oral, Q15 Min PRN **OR** glucose-vitamin C (Dex-4) chewable tab 8 tablet, 8 tablet, Oral, Q15 Min PRN    dextrose 5%-sodium chloride 0.9% infusion, , Intravenous, Continuous    ticagrelor (Brilinta) tab 90 mg, 90 mg, Oral, Q12H    lidocaine-menthol 4-1 % patch 1 patch, 1 patch, Transdermal, Daily    labetalol (Trandate) 5 mg/mL injection 10 mg, 10 mg, Intravenous, Q4H PRN    midazolam (Versed) 2 MG/2ML injection 2 mg, 2 mg, Intravenous, Once    octreoTIDe  (SandoSTATIN) 50 mcg/mL injection 50 mcg, 50 mcg, Subcutaneous, Q8H    HYDROmorphone (Dilaudid) 1 MG/ML injection 0.2 mg, 0.2 mg, Intravenous, Q4H PRN    pantoprazole (Protonix) 40 mg in sodium chloride 0.9% PF 10 mL IV push, 40 mg, Intravenous, Q12H    sodium chloride 0.9% infusion, , Intravenous, Continuous    rosuvastatin (Crestor) tab 10 mg, 10 mg, Oral, Nightly    sennosides (Senokot) tab 8.6 mg, 8.6 mg, Oral, BID    ceFEPIme (Maxipime) 1 g in sodium chloride 0.9% 100 mL IVPB-MBP, 1 g, Intravenous, Q24H    cetirizine (ZyrTEC) tab 5 mg, 5 mg, Oral, Daily    acetaminophen (Tylenol Extra Strength) tab 500 mg, 500 mg, Oral, Q6H PRN    docusate sodium (Colace) cap 100 mg, 100 mg, Oral, BID    polyethylene glycol (PEG 3350) (Miralax) 17 g oral packet 17 g, 17 g, Oral, Daily PRN    magnesium hydroxide (Milk of Magnesia) 400 MG/5ML oral suspension 30 mL, 30 mL, Oral, Daily PRN    bisacodyl (Dulcolax) 10 MG rectal suppository 10 mg, 10 mg, Rectal, Daily PRN    fleet enema (Fleet) rectal enema 133 mL, 1 enema, Rectal, Once PRN    meclizine (Antivert) tab 12.5 mg, 12.5 mg, Oral, TID PRN    acetaminophen (Tylenol) tab 650 mg, 650 mg, Oral, Q4H PRN **OR** acetaminophen (Tylenol) rectal suppository 650 mg, 650 mg, Rectal, Q4H PRN    hydrALAzine (Apresoline) 20 mg/mL injection 10 mg, 10 mg, Intravenous, Q2H PRN    ondansetron (Zofran) 4 MG/2ML injection 4 mg, 4 mg, Intravenous, Q6H PRN    metoclopramide (Reglan) 5 mg/mL injection 5 mg, 5 mg, Intravenous, Q8H PRN    acetaminophen (Tylenol) tab 650 mg, 650 mg, Oral, Q6H PRN    ondansetron (Zofran) 4 MG/2ML injection 4 mg, 4 mg, Intravenous, Q6H PRN    albuterol (Ventolin) (2.5 MG/3ML) 0.083% nebulizer solution 2.5 mg, 2.5 mg, Nebulization, Q4H PRN    carvedilol (Coreg) tab 12.5 mg, 12.5 mg, Oral, BID with meals    cyclobenzaprine (Flexeril) tab 10 mg, 10 mg, Oral, Nightly PRN    fluticasone-salmeterol (Advair Diskus) 250-50 MCG/ACT inhaler 1 puff, 1 puff, Inhalation, 2 times  per day    gabapentin (Neurontin) cap 100 mg, 100 mg, Oral, Nightly    montelukast (Singulair) tab 10 mg, 10 mg, Oral, Nightly    predniSONE (Deltasone) tab 5 mg, 5 mg, Oral, Daily    losartan (Cozaar) tab 50 mg, 50 mg, Oral, Daily    hydroxychloroquine (Plaquenil) tab 300 mg, 300 mg, Oral, Daily    oxyCODONE immediate release tab 5 mg, 5 mg, Oral, Q6H PRN    insulin aspart (NovoLOG) 100 Units/mL FlexPen 1-11 Units, 1-11 Units, Subcutaneous, TID CC    insulin degludec (Tresiba) 100 units/mL flextouch 10 Units, 10 Units, Subcutaneous, Nightly      Results:     Recent Labs   Lab 01/25/25  0332 01/25/25  0900 01/25/25  1604 01/25/25  2357 01/27/25  0031 01/27/25  0514 01/27/25  1009   RBC 3.18*  --  3.03*  --   --  3.00*  --    HGB 8.0*   < > 7.7*   < > 7.6* 7.4* 7.8*   HCT 25.3*   < > 24.4*   < > 23.3* 24.3* 25.0*   MCV 79.6*  --  80.5  --   --  81.0  --    MCH 25.2*  --  25.4*  --   --  24.7*  --    MCHC 31.6  --  31.6  --   --  30.5*  --    RDW 16.6*  --  16.8*  --   --  16.8*  --    NEPRELIM 11.71*  --  12.54*  --   --  13.37*  --    WBC 14.9*  --  14.5*  --   --  17.2*  --    .0  375.0  --  336.0  --   --  340.0  --     < > = values in this interval not displayed.         Recent Labs   Lab 01/25/25  0652 01/26/25  0518 01/26/25  1730 01/27/25  0514   * 65*  --  252*   BUN 16 11  --  8*   CREATSERUM 1.03* 0.88  --  0.89   EGFRCR 53* 64  --  63   CA 10.1 9.8  --  9.1    139  --  143   K 3.9  3.9 2.9* 3.8 3.5  3.5    107  --  111   CO2 24.0 22.0  --  23.0         Imaging:   MRI BRAIN (CPT=70551)    Result Date: 1/25/2025  CONCLUSION:  1. Interval development of multiple acute infarcts with largest in the left parietal occipital region and left posterior temporal lobe..  Other smaller infarcts in the bilateral posterior frontal lobes, right paramedian posterior parietal lobe, right occipital lobe, left anterior frontal lobe. 2. Recent left thalamic lacunar infarct is unchanged.  3. One of  recent cerebellar lacunar infarcts is much less conspicuous and others no longer restrict diffusion.  Other chronic cerebellar lacunar infarcts. 4. Post right frontal craniotomy for aneurysm clipping.    Dictated by (CST): Michael Gonzalez MD on 1/25/2025 at 6:22 PM     Finalized by (CST): Michael Gonzalez MD on 1/25/2025 at 6:31 PM          CTA BRAIN + CTA CAROTIDS (CPT=70496/36306)    Result Date: 1/25/2025  CONCLUSION:  1. No large vessel occlusion, hemodynamically significant stenosis, dissection, AVM or aneurysm. 2. Wall 3. Right frontal craniotomy for aneurysm clipping. 4. Patent proximal left subclavian artery stent. 5. Changes of chronic small vessel disease in both cerebral hemispheres. 6. Irregular atherosclerotic plaque in the thoracic aorta. 7. Advanced emphysema with pleural parenchymal scarring.     Dictated by (CST): Michael Gonzalez MD on 1/25/2025 at 1:31 PM     Finalized by (CST): Michael Gonzalez MD on 1/25/2025 at 1:49 PM                 Assessment and Plan:   #Left Cerebellar Infarcts  #SAH  - pt with new sudden onset neuro changes  - 2 small cerebellar infarcts seen on admission MRI  -Patient already took 3 baby aspirin's prior to coming to ED   - CTA negative for LVO, but showing small subsegmental Pes  - Stroke alert called 1/19 -> stroke CT non-acute, MRI showing small CVA in L thalamus   - f/u lipid panel, A1c, TSH, ECHO   - Neuro on consult  -Continue statin  -Brillinta resumed 01/26/2025  -high risk morbidity/mortality given her history of GI bleed and now need for antiplatelet therapy and potential anticoagulation in the future. Family is aware  -gradually lower BP  -Vascular surgery on consult  -s/p stent placement 10/24/2025  -CT head reviewed, repeat CT pending  -Appreciate further neuro and vascular surgery input  -PT/OT  -continue present management    #Dizziness  Improved   -Likely in setting of above  -Meclizine prn   -Neurology consulted   -continue brilinta and statin    #Anemia  -in  the setting of PE and aortic arch thrombus, the decision was made to resume anticoagulation despite the patients history of bleeding AVMs  -Hb appears to be stable at this time 8.4 -> 7.8 -> 7.7  -Transfused 1U pRBC, 1U platelets  -monitor H&H, transfuse as indicated  -GI on consult   Start octreotide TID   Resume antiplatelet therapy if needed by cardiology/vascular/neurology  Possible endoscopic eval vs transfer to a tertiary care center for balloon enteroscopy and colonoscopy   Close opt follow up     #PE   -Patient is hemodynamically stable, EKG NSR and troponin not elevated.  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 Already took 3 baby aspirins prior to admission  -Previously had IVC filter placed in March 2024 for PE at that time (removed June 2024).   -Needs eval for hypercoagulable state. Risk factors of recent car ride, overall sedentary state, breast cancer. Sees Dr. Cade Chu in clinic for anemia, needs further follow up   -given current presentation the patient was restarted on AC, close Hb monitoring     #Descending aortic arch thrombus   -Admission CTA chest revealing mural thrombus throughout the thoracic and abdominal aorta, chronic per radiology  -Vascular surgery on consult  -planning for angiogram 01/24/2025  -cont brilinta and statin for now      #HTN  -Hold home antihypertensives to allow for permissive hypertension poststroke     #Klebsiella UTI  -nitrofurantoin initiated in ED, UCx reviewed - nitrofurantoin resistant  -started on IV Cefepime - day 6  -deescalate abx as indicated    #Constipation  -bowel regimen ordered     Chronic Medical Problems  HTN  HLD  DM  COPD/?sarcoidosis   Rheumatoid Arthritis  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

## 2025-01-27 NOTE — PROGRESS NOTES
[facility]  [address], [address]  [facility] [address]  [phone]          INPATIENT STROKE NEUROLOGY   FOLLOW UP PROGRESS NOTE  [facility]  part of [facility]    Chester Bautista Patient Status:  Inpatient     1940 MRN [id number]    Location [facility] 2W/SW Attending Bonny Carranza MD    Hosp Day # 9 PCP Heike Sorto MD    Date of Admission:  2025  Date of Consult Follow Up:  25     Assessment and Plan:   Chester Bautista is a 84 year old woman w/ a pmhx of 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    Initially she had left-sided posterior circulation strokes in her cerebellum on admission and a new left thalamic stroke on  now status post vertebral artery stenting on  C/B acute blood loss anemia requiring 1 unit of PRBCs.  Then had bihemispheric embolic strokes on 2025.    Per prior discussions with GI service patient is at high risk of recurrent bleeding due to her AVMs and the potential harms of anticoagulation would outweigh the potential benefits.  Therefore she was started on Plavix and then switched to Brilinta.    After her vertebral artery stenting the she complained of a headache with migrainous features.  She had a stat head CT which demonstrated contrast exacerbation versus new subarachnoid hemorrhage.  She had a repeat head CT 6 hours later which demonstrated continued improvement of her subarachnoid hemorrhage, and her repeat head CT at 6  AM on 1/25/2025 demonstrate near resolution of the contrast/subarachnoid blood.  Patient was maintained on her antiplatelets.  Repeat exam on the morning of 1/25/2025 demonstrated new global aphasia, and a questionable right visual field deficit.  She had a repeat stat head CT and CTA head and neck that did not demonstrate any interval any interval large vessel occlusion.  However, her repeat MRI of the brain demonstrates interval infarcts in her left parietal/occipital region, left posterior temporal lobe, small medial occipital lobe infarct,   L>R frontal lobe/motor cortex, and left frontal lobe. She has infarct in her anterior and posterior circulation.    Her exam   improved on 1/26/2025 however she still has persistent right hemianopsia and R LE > U UE weakness.    Restarted her Brilinta 90 mg twice daily on 1/26/2025.  Reassessment 1/27/2025; patient had a new oxygen requirement.  She was now on nasal cannula.  Discussed with critical care service who recommended stat lower extremity Dopplers and concurred that she would benefit from low intensity anticoagulation.    She was not a candidate for tPA because she is out of the time window and because of her history of recurrent GI bleeds and AVMs.  Not can for thrombectomy because no LVO.     Recurrent strokes due to chronic descending aorta mural thrombus in the setting of  chronic anemia due to numerous GI AVMs  Differential Diagnosis for stroke/TIA mechanism:  Embolic due to mural thrombus in her aorta       Plan    Diagnostics:  Imaging: MRI brain WO, CT brain WO, and CTA head/neck  ; stat CTA head head and neck if there exam worsens again.  At this time no plan for repeat CT   Cardiac imaging: Telemetry and TTE    Labs: Fasting lipid panel and HgbA1C - already ordered. Will have to trend hemoglobin   Therapeutics:  Blood pressure goal: Permissive hypertension: Less than 180/110.  May lower blood pressure goal after starting on heparin drip.  Please avoid  blood pressure variability. wide fluctuations in BP can lead to stroke expansion.    Do not recommend lowering the blood pressure more than 25% in a 24-hour period.  Please avoid the use of atenolol as this medication is known to cause blood pressure variability.  PRN hydralazine and labetalol for sustained pressures outside of goal  Neuro checks Q2.  Telemetry.NIH stroke scale per protocol.  Blood glucose goal: .  Accuchecks Q6 if patient has DM  closely monitor to prevent hypoglycemia in patients with AIS.  Antithrombotics: Brilinta 90 mg twice daily.  Brilinta was on hold.  Resumed on 1/26/2025.  Tentative plan is for low intensity anticoagulation with a PTT goal of 50-90 and no boluses starting on 1/28/2025.  Lipid-lowering agents: Cholesterol Meds: rosuvastatin - 10 MG; rosuvastatin Tabs - 20 MG     Avoid hypotonic fluids as this can worsen cerebral edema.  Physical therapy, Occupational Therapy, speech therapy evaluations ordered.  maintain oxygen saturation >94%. No supplemental oxygen  in nonhypoxic patients with acute ischemic stroke (AIS).  Tx hyperthermia (temperature >38°C) and identify source  STAT head CT and page neurology if any change in neurological exam or focal deficits.    Long term secondary stroke prevention goals:  Home BP monitoring. Pt instructed to check BP at home in the AM and PM, and bring values to future clinic visits. BP goal is for normotension, < 130/80.  HbA1c goal <7.0  LDL-C goal  <70 mg/dL.  Target total cholesterol < 200 mg/dL Target HDL >45 mg/dL for men, >55 mg/dL for women, Target triglycerides <150 mg/dL  Physical inactivity - target exercise at least 3 times per week of moderate intensity activity for 20 minutes.  Obesity - target ideal body weight and girth <35\" for women, <40\" for men  Smoking - Target smoking cessation    2. Left shoulder pain  Lidocaine patch         INTERVAL EVENTS  01/25/25: Neurological exam was worse this morning.  Patient was aphasic.  She  could only say \"1, 2, and yes.\"  ?Left gaze preference but could look to the right when asked specifically.  Questionable incongruent right hemianopsia..  Followed some axial and appendicular commands.  Repeat CTA for aphasia.  Repeat MRI demonstrated new infarct.  New motor deficits noted by RN at approximately 4:30 PM.  01/26/25  1:40 AM -acutely agitated.  Reported acute plan.  Complaint of significant visual field deficits.  Noted to have worsening drift in right upper extremity.  Neurological exam improved compared to 1/25/2025.  Restarted Brilinta 90 mg twice daily  01/27/25: New oxygen requirement.  Patient has nasal cannula in nares.  Reports of orthopnea which is new.  Repeat Dopplers ordered.         SUBJECTIVE:   Patient was seen laying in bed.  She reports that she has a new oxygen requirement.  She states she is not sure if it something that she is person experiencing.  Daughter states that they were told her oxygen saturation is normal and the patient is off nasal cannula but patient reports that she cannot lay flat and she cannot sleep because of her shortness of breath.  No new neurological deficits.  Again discussed plan for therapeutic anticoagulation.  Answered family's questions.  Discussed potential harms and benefits of therapeutic and coagulation.      Pertinent positive and negatives per HPI.  All others were reviewed and negative.       Objective   OBJECTIVE:   Last vitals and weight :  Blood pressure 156/83, pulse 95, temperature 97.7 °F (36.5 °C), resp. rate 22, height 61\", weight 135 lb 9.6 oz (61.5 kg), SpO2 95%.   Vitals:    01/27/25 0600 01/27/25 0800 01/27/25 1200 01/27/25 1600   BP: (!) 162/93 156/83     BP Location: Right leg      Pulse: 94 95     Resp: 22 22     Temp:  97.9 °F (36.6 °C) 97.6 °F (36.4 °C) 97.7 °F (36.5 °C)   TempSrc:  Temporal     SpO2: 98% 95%     Weight:       Height:          Exam:  - General: appears older than stated age and no distress  - CV: Symmetric  pulses.   Carotids:   - Pulmonary: no overt sign of respiratory distress.  She is not dyspneic when she speaking.  She has nasal cannula in her nares.  No nasal flaring.  No tripoding.  No accessory use of muscles.  Neurologic Exam  - Mental Status: She is awake and alert.  She is pleasant and cooperative.  She is able to tell jokes.  She has insight into her deficits.  She is able to name.  She is able to repeat.  She is oriented to self.  She knew her location, the month and the year.    - Cranial Nerves: Questionable left gaze gaze preference.  .  Visual fields: Incongruent right hemianopsia.  Pupils are 4mm briskly constricting to 3mm and equally round and reactive to light  in a well lit room. EOMI. No nystagmus. No ptosis. V1-V3 intact B/L to light touch.No pathological facial asymmetry. No flattening of the nasolabial fold. .  Hearing grossly intact.  Tongue midline. No atrophy or fasiculations of the tongue noted. Palate and uvula elevate symmetrically.  Shoulder shrug symmetric.    - Motor:   diffusely Decreased bulk.    Pronator drift: No pronator drift .  Keep both arms elevated antigravity for 10 seconds.    Leg Drift: Able to keep left leg antigravity for 5 seconds.  Right leg drift.  - Sensory:   Light touch: normal  - Cerebellum: No truncal ataxia. No titubations. No dysmetria         Medications:   heparin        ticagrelor  90 mg Oral Q12H    lidocaine-menthol  1 patch Transdermal Daily    midazolam  2 mg Intravenous Once    octreoTIDe  50 mcg Subcutaneous Q8H    pantoprazole  40 mg Intravenous Q12H    rosuvastatin  10 mg Oral Nightly    sennosides  8.6 mg Oral BID    cefepime  1 g Intravenous Q24H    cetirizine  5 mg Oral Daily    docusate sodium  100 mg Oral BID    carvedilol  12.5 mg Oral BID with meals    fluticasone-salmeterol  1 puff Inhalation 2 times per day    gabapentin  100 mg Oral Nightly    montelukast  10 mg Oral Nightly    predniSONE  5 mg Oral Daily    losartan  50 mg Oral Daily     hydroxychloroquine  300 mg Oral Daily    insulin aspart  1-11 Units Subcutaneous TID CC    insulin degludec  10 Units Subcutaneous Nightly       PRNS:   heparin    glucose **OR** glucose **OR** glucose-vitamin C **OR** dextrose **OR** glucose **OR** glucose **OR** glucose-vitamin C    labetalol    HYDROmorphone    acetaminophen    polyethylene glycol (PEG 3350)    magnesium hydroxide    bisacodyl    fleet enema    meclizine    acetaminophen **OR** acetaminophen    hydrALAzine    ondansetron    metoclopramide    acetaminophen    ondansetron    albuterol    cyclobenzaprine    oxyCODONE    Infusions:    dextrose 5%-sodium chloride 0.9% 83 mL/hr at 01/27/25 1023    sodium chloride Stopped (01/23/25 1630)          Results:   Laboratory Data:  Lab Results   Component Value Date    WBC 17.2 (H) 01/27/2025    HGB 7.8 (L) 01/27/2025    HCT 25.0 (L) 01/27/2025    .0 01/27/2025    CREATSERUM 0.89 01/27/2025    BUN 8 (L) 01/27/2025     01/27/2025    K 3.5 01/27/2025    K 3.5 01/27/2025     01/27/2025    CO2 23.0 01/27/2025     (H) 01/27/2025    CA 9.1 01/27/2025    ALB 3.2 01/27/2025    ALKPHO 76 01/27/2025    TP 6.3 01/27/2025    AST 20 01/27/2025    ALT <7 (L) 01/27/2025    PTT 26.6 01/25/2025    INR 1.04 01/25/2025    PTP 14.3 01/25/2025    T4F 1.2 01/17/2025    TSH 0.418 (L) 01/17/2025    DDIMER 2.77 (H) 01/25/2025    ESRML >130 (H) 11/29/2024    CRP 3.00 (H) 11/29/2024    MG 1.5 (L) 01/27/2025    PHOS 4.0 01/22/2025    B12 814 11/01/2024     Recent Results (from the past 72 hours)   POCT Glucose    Collection Time: 01/24/25  5:36 PM   Result Value Ref Range    POC Glucose  266 (H) 70 - 99 mg/dL   Prepare fresh frozen plasma Once    Collection Time: 01/24/25  8:00 PM   Result Value Ref Range    Blood Product [id number]     Unit Number [id number]     UNIT ABO B     UNIT RH POS     Product Status Cross Matched     Expiration Date 171039491458     Blood Type Barcode 7300     Unit Volume 316 ml    POCT Glucose    Collection Time: 01/24/25  9:09 PM   Result Value Ref Range    POC Glucose  172 (H) 70 - 99 mg/dL   CBC With Differential With Platelet    Collection Time: 01/25/25  3:32 AM   Result Value Ref Range    WBC 14.9 (H) 4.0 - 11.0 x10(3) uL    RBC 3.18 (L) 3.80 - 5.30 x10(6)uL    HGB 8.0 (L) 12.0 - 16.0 g/dL    HCT 25.3 (L) 35.0 - 48.0 %    MCV 79.6 (L) 80.0 - 100.0 fL    MCH 25.2 (L) 26.0 - 34.0 pg    MCHC 31.6 31.0 - 37.0 g/dL    RDW-SD 48.2 (H) 35.1 - 46.3 fL    RDW 16.6 (H) 11.0 - 15.0 %    .0 150.0 - 450.0 10(3)uL    Neutrophil Absolute Prelim 11.71 (H) 1.50 - 7.70 x10 (3) uL    Neutrophil Absolute 11.71 (H) 1.50 - 7.70 x10(3) uL    Lymphocyte Absolute 1.28 1.00 - 4.00 x10(3) uL    Monocyte Absolute 1.35 (H) 0.10 - 1.00 x10(3) uL    Eosinophil Absolute 0.42 0.00 - 0.70 x10(3) uL    Basophil Absolute 0.05 0.00 - 0.20 x10(3) uL    Immature Granulocyte Absolute 0.07 0.00 - 1.00 x10(3) uL    Neutrophil % 78.7 %    Lymphocyte % 8.6 %    Monocyte % 9.1 %    Eosinophil % 2.8 %    Basophil % 0.3 %    Immature Granulocyte % 0.5 %   Prothrombin Time (PT)    Collection Time: 01/25/25  3:32 AM   Result Value Ref Range    PT 14.3 11.6 - 14.8 seconds    INR 1.04 0.80 - 1.20   Platelet Count    Collection Time: 01/25/25  3:32 AM   Result Value Ref Range    .0 150.0 - 450.0 10(3)uL   PTT, Activated    Collection Time: 01/25/25  3:32 AM   Result Value Ref Range    PTT 26.6 23.0 - 36.0 seconds   Fibrinogen Activity    Collection Time: 01/25/25  3:32 AM   Result Value Ref Range    Fibrinogen 369 200 - 480 mg/dL   D-Dimer    Collection Time: 01/25/25  3:32 AM   Result Value Ref Range    D-Dimer 2.77 (H) <0.85 ug/mL FEU   Comp Metabolic Panel (14)    Collection Time: 01/25/25  6:52 AM   Result Value Ref Range    Glucose 130 (H) 70 - 99 mg/dL    Sodium 142 136 - 145 mmol/L    Potassium 3.9 3.5 - 5.1 mmol/L    Chloride 108 98 - 112 mmol/L    CO2 24.0 21.0 - 32.0 mmol/L    Anion Gap 10 0 - 18 mmol/L    BUN  16 9 - 23 mg/dL    Creatinine 1.03 (H) 0.55 - 1.02 mg/dL    BUN/CREA Ratio 15.5 10.0 - 20.0    Calcium, Total 10.1 8.7 - 10.4 mg/dL    Calculated Osmolality 297 (H) 275 - 295 mOsm/kg    eGFR-Cr 53 (L) >=60 mL/min/1.73m2    ALT 9 (L) 10 - 49 U/L    AST 18 <34 U/L    Alkaline Phosphatase 74 55 - 142 U/L    Bilirubin, Total 0.3 0.2 - 1.1 mg/dL    Total Protein 6.9 5.7 - 8.2 g/dL    Albumin 3.6 3.2 - 4.8 g/dL    Globulin  3.3 2.0 - 3.5 g/dL    A/G Ratio 1.1 1.0 - 2.0   Magnesium    Collection Time: 01/25/25  6:52 AM   Result Value Ref Range    Magnesium 2.1 1.6 - 2.6 mg/dL   Potassium    Collection Time: 01/25/25  6:52 AM   Result Value Ref Range    Potassium 3.9 3.5 - 5.1 mmol/L   POCT Glucose    Collection Time: 01/25/25  8:55 AM   Result Value Ref Range    POC Glucose  140 (H) 70 - 99 mg/dL   Hemoglobin & Hematocrit    Collection Time: 01/25/25  9:00 AM   Result Value Ref Range    HGB 7.9 (L) 12.0 - 16.0 g/dL    HCT 24.9 (L) 35.0 - 48.0 %   POCT Glucose    Collection Time: 01/25/25 12:08 PM   Result Value Ref Range    POC Glucose  135 (H) 70 - 99 mg/dL   CBC With Differential With Platelet    Collection Time: 01/25/25  4:04 PM   Result Value Ref Range    WBC 14.5 (H) 4.0 - 11.0 x10(3) uL    RBC 3.03 (L) 3.80 - 5.30 x10(6)uL    HGB 7.7 (L) 12.0 - 16.0 g/dL    HCT 24.4 (L) 35.0 - 48.0 %    MCV 80.5 80.0 - 100.0 fL    MCH 25.4 (L) 26.0 - 34.0 pg    MCHC 31.6 31.0 - 37.0 g/dL    RDW-SD 48.9 (H) 35.1 - 46.3 fL    RDW 16.8 (H) 11.0 - 15.0 %    .0 150.0 - 450.0 10(3)uL    Neutrophil Absolute Prelim 12.54 (H) 1.50 - 7.70 x10 (3) uL    Neutrophil Absolute 12.54 (H) 1.50 - 7.70 x10(3) uL    Lymphocyte Absolute 0.92 (L) 1.00 - 4.00 x10(3) uL    Monocyte Absolute 0.70 0.10 - 1.00 x10(3) uL    Eosinophil Absolute 0.18 0.00 - 0.70 x10(3) uL    Basophil Absolute 0.06 0.00 - 0.20 x10(3) uL    Immature Granulocyte Absolute 0.12 0.00 - 1.00 x10(3) uL    Neutrophil % 86.5 %    Lymphocyte % 6.3 %    Monocyte % 4.8 %    Eosinophil  % 1.2 %    Basophil % 0.4 %    Immature Granulocyte % 0.8 %   POCT Glucose    Collection Time: 01/25/25  4:25 PM   Result Value Ref Range    POC Glucose  116 (H) 70 - 99 mg/dL   POCT Glucose    Collection Time: 01/25/25  9:20 PM   Result Value Ref Range    POC Glucose  124 (H) 70 - 99 mg/dL   Hemoglobin & Hematocrit    Collection Time: 01/25/25 11:57 PM   Result Value Ref Range    HGB 7.4 (L) 12.0 - 16.0 g/dL    HCT 23.7 (L) 35.0 - 48.0 %   Prepare RBC Once    Collection Time: 01/26/25 12:40 AM   Result Value Ref Range    Blood Product [id number]     Unit Number [id number]     UNIT ABO B     UNIT RH POS     Product Status Presumed Transfused     Expiration Date 402201270000     Blood Type Barcode 7300     Unit Volume 350 ml   Prepare platelets Once    Collection Time: 01/26/25 12:40 AM   Result Value Ref Range    Blood Product [id number]     Unit Number [id number]     UNIT ABO A     UNIT RH POS     Product Status Presumed Transfused     Expiration Date 006784730461     Blood Type Barcode 6200     Unit Volume 279 ml   Comp Metabolic Panel (14)    Collection Time: 01/26/25  5:18 AM   Result Value Ref Range    Glucose 65 (L) 70 - 99 mg/dL    Sodium 139 136 - 145 mmol/L    Potassium 2.9 (LL) 3.5 - 5.1 mmol/L    Chloride 107 98 - 112 mmol/L    CO2 22.0 21.0 - 32.0 mmol/L    Anion Gap 10 0 - 18 mmol/L    BUN 11 9 - 23 mg/dL    Creatinine 0.88 0.55 - 1.02 mg/dL    BUN/CREA Ratio 12.5 10.0 - 20.0    Calcium, Total 9.8 8.7 - 10.4 mg/dL    Calculated Osmolality 286 275 - 295 mOsm/kg    eGFR-Cr 64 >=60 mL/min/1.73m2    ALT <7 (L) 10 - 49 U/L    AST 23 <34 U/L    Alkaline Phosphatase 71 55 - 142 U/L    Bilirubin, Total 0.4 0.2 - 1.1 mg/dL    Total Protein 6.5 5.7 - 8.2 g/dL    Albumin 3.4 3.2 - 4.8 g/dL    Globulin  3.1 2.0 - 3.5 g/dL    A/G Ratio 1.1 1.0 - 2.0   Magnesium    Collection Time: 01/26/25  5:18 AM   Result Value Ref Range    Magnesium 1.6 1.6 - 2.6 mg/dL   POCT Glucose    Collection Time: 01/26/25  7:18 AM    Result Value Ref Range    POC Glucose  67 (L) 70 - 99 mg/dL   POCT Glucose    Collection Time: 01/26/25  7:42 AM   Result Value Ref Range    POC Glucose  86 70 - 99 mg/dL   POCT Glucose    Collection Time: 01/26/25  8:45 AM   Result Value Ref Range    POC Glucose  196 (H) 70 - 99 mg/dL   Hemoglobin & Hematocrit    Collection Time: 01/26/25  8:49 AM   Result Value Ref Range    HGB 7.6 (L) 12.0 - 16.0 g/dL    HCT 24.5 (L) 35.0 - 48.0 %   POCT Glucose    Collection Time: 01/26/25 10:08 AM   Result Value Ref Range    POC Glucose  235 (H) 70 - 99 mg/dL   POCT Glucose    Collection Time: 01/26/25 12:23 PM   Result Value Ref Range    POC Glucose  264 (H) 70 - 99 mg/dL   Hemoglobin & Hematocrit    Collection Time: 01/26/25  5:30 PM   Result Value Ref Range    HGB 8.0 (L) 12.0 - 16.0 g/dL    HCT 24.7 (L) 35.0 - 48.0 %   Potassium    Collection Time: 01/26/25  5:30 PM   Result Value Ref Range    Potassium 3.8 3.5 - 5.1 mmol/L   POCT Glucose    Collection Time: 01/26/25  6:44 PM   Result Value Ref Range    POC Glucose  265 (H) 70 - 99 mg/dL   POCT Glucose    Collection Time: 01/26/25  8:56 PM   Result Value Ref Range    POC Glucose  248 (H) 70 - 99 mg/dL   Hemoglobin & Hematocrit    Collection Time: 01/27/25 12:31 AM   Result Value Ref Range    HGB 7.6 (L) 12.0 - 16.0 g/dL    HCT 23.3 (L) 35.0 - 48.0 %   Magnesium    Collection Time: 01/27/25  5:14 AM   Result Value Ref Range    Magnesium 1.5 (L) 1.6 - 2.6 mg/dL   Comp Metabolic Panel (14)    Collection Time: 01/27/25  5:14 AM   Result Value Ref Range    Glucose 252 (H) 70 - 99 mg/dL    Sodium 143 136 - 145 mmol/L    Potassium 3.5 3.5 - 5.1 mmol/L    Chloride 111 98 - 112 mmol/L    CO2 23.0 21.0 - 32.0 mmol/L    Anion Gap 9 0 - 18 mmol/L    BUN 8 (L) 9 - 23 mg/dL    Creatinine 0.89 0.55 - 1.02 mg/dL    BUN/CREA Ratio 9.0 (L) 10.0 - 20.0    Calcium, Total 9.1 8.7 - 10.4 mg/dL    Calculated Osmolality 303 (H) 275 - 295 mOsm/kg    eGFR-Cr 63 >=60 mL/min/1.73m2    ALT <7 (L) 10  - 49 U/L    AST 20 <34 U/L    Alkaline Phosphatase 76 55 - 142 U/L    Bilirubin, Total 0.4 0.2 - 1.1 mg/dL    Total Protein 6.3 5.7 - 8.2 g/dL    Albumin 3.2 3.2 - 4.8 g/dL    Globulin  3.1 2.0 - 3.5 g/dL    A/G Ratio 1.0 1.0 - 2.0   CBC With Differential With Platelet    Collection Time: 01/27/25  5:14 AM   Result Value Ref Range    WBC 17.2 (H) 4.0 - 11.0 x10(3) uL    RBC 3.00 (L) 3.80 - 5.30 x10(6)uL    HGB 7.4 (L) 12.0 - 16.0 g/dL    HCT 24.3 (L) 35.0 - 48.0 %    MCV 81.0 80.0 - 100.0 fL    MCH 24.7 (L) 26.0 - 34.0 pg    MCHC 30.5 (L) 31.0 - 37.0 g/dL    RDW-SD 49.1 (H) 35.1 - 46.3 fL    RDW 16.8 (H) 11.0 - 15.0 %    .0 150.0 - 450.0 10(3)uL    Neutrophil Absolute Prelim 13.37 (H) 1.50 - 7.70 x10 (3) uL    Neutrophil Absolute 13.37 (H) 1.50 - 7.70 x10(3) uL    Lymphocyte Absolute 1.65 1.00 - 4.00 x10(3) uL    Monocyte Absolute 1.52 (H) 0.10 - 1.00 x10(3) uL    Eosinophil Absolute 0.50 0.00 - 0.70 x10(3) uL    Basophil Absolute 0.06 0.00 - 0.20 x10(3) uL    Immature Granulocyte Absolute 0.11 0.00 - 1.00 x10(3) uL    Neutrophil % 77.8 %    Lymphocyte % 9.6 %    Monocyte % 8.8 %    Eosinophil % 2.9 %    Basophil % 0.3 %    Immature Granulocyte % 0.6 %   Potassium    Collection Time: 01/27/25  5:14 AM   Result Value Ref Range    Potassium 3.5 3.5 - 5.1 mmol/L   Prepare RBC Once    Collection Time: 01/27/25  6:53 AM   Result Value Ref Range    Blood Product [id number]     Unit Number [id number]     UNIT ABO B     UNIT RH POS     Product Status Released from Crossmatch     Expiration Date [id number]     Blood Type Barcode 7300     Unit Volume 350 ml   POCT Glucose    Collection Time: 01/27/25  9:55 AM   Result Value Ref Range    POC Glucose  263 (H) 70 - 99 mg/dL   Hemoglobin & Hematocrit    Collection Time: 01/27/25 10:09 AM   Result Value Ref Range    HGB 7.8 (L) 12.0 - 16.0 g/dL    HCT 25.0 (L) 35.0 - 48.0 %            Last A1c was done on 1/18/2025.       Test results/Imaging:   XR PORT VENOUS W FLUORO  INJ (CPT=36598)    Result Date: 1/27/2025  CONCLUSION: Fibrin sheath around the distal 5 cm segment of the Port-A-Cath resulting in inability to aspirate.  Please note, however, that the Port-A-Cath is in good position and can be used for injection as contrast/fluid is able to pass around the fibrin sheath into the right atrium.    Dictated by (CST): Parker Keane MD on 1/27/2025 at 1:52 PM     Finalized by (CST): Parker Keane MD on 1/27/2025 at 1:55 PM               CT BRAIN OR HEAD (CPT=70450)    Result Date: 1/25/2025  CONCLUSION:  1. No intracranial hemorrhage. 2. Acute left parietal occipital cortical infarct and posterior temporal cortical infarct.  Other acute infarcts seen on the recent MRI are not detectable with certainty. 3. Right frontal craniotomy and para falcine aneurysm clipping.  New line large vessel intracranial atherosclerosis.    Dictated by (CST): Michael Gonzalez MD on 1/25/2025 at 7:43 PM     Finalized by (CST): Michael Gonzalez MD on 1/25/2025 at 7:48 PM          MRI BRAIN (CPT=70551)    Result Date: 1/25/2025  CONCLUSION:  1. Interval development of multiple acute infarcts with largest in the left parietal occipital region and left posterior temporal lobe..  Other smaller infarcts in the bilateral posterior frontal lobes, right paramedian posterior parietal lobe, right occipital lobe, left anterior frontal lobe. 2. Recent left thalamic lacunar infarct is unchanged.  3. One of recent cerebellar lacunar infarcts is much less conspicuous and others no longer restrict diffusion.  Other chronic cerebellar lacunar infarcts. 4. Post right frontal craniotomy for aneurysm clipping.    Dictated by (CST): Michael Gonzalez MD on 1/25/2025 at 6:22 PM     Finalized by (CST): Michael Gonzalez MD on 1/25/2025 at 6:31 PM          CTA BRAIN + CTA CAROTIDS (CPT=70496/94321)    Result Date: 1/25/2025  CONCLUSION:  1. No large vessel occlusion, hemodynamically significant stenosis, dissection, AVM or aneurysm.  2. Wall 3. Right frontal craniotomy for aneurysm clipping. 4. Patent proximal left subclavian artery stent. 5. Changes of chronic small vessel disease in both cerebral hemispheres. 6. Irregular atherosclerotic plaque in the thoracic aorta. 7. Advanced emphysema with pleural parenchymal scarring.     Dictated by (CST): Michael Gonzalez MD on 1/25/2025 at 1:31 PM     Finalized by (CST): Michael Gonzalez MD on 1/25/2025 at 1:49 PM          CT BRAIN OR HEAD (CPT=70450)    Result Date: 1/24/2025  CONCLUSION:  1. Subarachnoid hemorrhage in the left frontal high convexity in bilateral occipital lobes with mild cerebral edema. Continued surveillance is recommended.  2. Postoperative changes of frontal craniotomy and anterior parafalcine aneurysm clipping.  3. Senescent changes of parenchymal volume loss with sequela of chronic microvascular ischemic disease.  4. There is large vessel atherosclerosis involving the anterior and posterior intracranial circulations.  5. Lesser incidental findings as above.      Results of this examination were conveyed to the patient's physician, Dr. Ronald March, by Dr. Gruber at 1838 on 01/24/2025.   Dictated by (CST): Sharath Gruber MD on 1/24/2025 at 6:35 PM     Finalized by (CST): Sharath Gruber MD on 1/24/2025 at 6:41 PM          CT BRAIN OR HEAD (CPT=70450)    Result Date: 1/24/2025  CONCLUSION:  1. Retained contrast material throughout the large intracranial vessels and cerebral sulci from same date cardiac angiogram.  Please note that this finding limits evaluation for detection of potential acute intracranial hemorrhage.  Aforementioned sulcal  enhancement most likely relates to a contrast staining.  However, if symptoms or strong clinical suspicion for acute intracranial hemorrhage persist, a short interval 6-12 hour follow-up head CT is recommended. 2. Subcentimeter enhancing foci in the left cerebellum (x2) likely relate to known enhancing subacute lacunar type infarcts.  Continued  follow-up to ensure resolution of enhancement is advised. 3. Midline frontal craniotomy and anterior parafalcine aneurysm clipping. 4. Intracranial atherosclerosis. 5. Stable chronic moderate adenoid enlargement.  elm-remote  Dictated by (CST): Jose Enrique Valente MD on 1/24/2025 at 11:33 AM     Finalized by (CST): Jose Enrique Valente MD on 1/24/2025 at 11:40 AM          MRI BRAIN (CPT=70551)    Result Date: 1/20/2025  CONCLUSION:  1. Interval development of a punctate lacunar infarct of the left thalamus, concerning for acute ischemia.  2. Subacute lacunar infarcts of the left cerebellar hemisphere.   3. Postoperative changes of frontal craniotomy and parafalcine aneurysm clipping.  4. Senescent changes of parenchymal volume loss with mild sequela of chronic microvascular ischemic disease.  5. Lesser incidental findings as above.      A preliminary report was issued by the [facility] teleradiology service. There are no major discrepancies.  elm-remote.   Dictated by (CST): Sharath Gruber MD on 1/20/2025 at 9:24 AM     Finalized by (CST): Sharath Gruber MD on 1/20/2025 at 9:31 AM          CT STROKE BRAIN (NO IV)(CPT=70450)    Result Date: 1/19/2025  CONCLUSION:  1. Small focus of hypoattenuation involving the left cerebellar hemisphere, which corresponds to the acute infarct demonstrated on the prior MRI brain dated 01/17/2025. 2. Otherwise, no transcortical area of hypoattenuation to suggest an acute infarct.  If there is clinical concern for an acute infarct, consider further evaluation with an MRI of the brain. 3. No acute intracranial hemorrhage, midline shift, mass effect, or hydrocephalus. 4. Redemonstrated postoperative changes from prior frontal craniotomy for aneurysm clipping along the course of the anterior cerebral arteries.  The associated metallic artifact from the aneurysm clips limits evaluation of the adjacent structures. 5. Lesser incidental findings described above.    Impression points 1-3 were  communicated via telephone to Leonidas SOW at 9:39 a.m. on 01/19/2025 by Joe Deleon MD  Dictated by (CST): Joe Deleon MD on 1/19/2025 at 9:34 AM     Finalized by (CST): Joe Deleon MD on 1/19/2025 at 9:41 AM          CT STROKE CTA BRAIN/CTA NECK (W IV)(CPT=70496/37723)    Result Date: 1/17/2025  CONCLUSION:  1. Negative for hemodynamically significant stenosis or occlusion of the anterior or posterior circulations.   2. The internal carotid arteries demonstrate tortuosity proximally without evidence of hemodynamically stenosis or occlusion.  3. The vertebral arteries demonstrate contiguous patency bilaterally without evidence of flow-limiting stenosis.  4. 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 Thrombus extends into the proximal left subclavian artery origin, contributing to luminal narrowing.  5. Dilatation of the main pulmonary artery trunk may relate to underlying pulmonary hypertension.  6. 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   7. Lesser incidental findings as above.   Dictated by (CST): Sharath Gruber MD on 1/17/2025 at 7:27 PM     Finalized by (CST): Sharath Gruber MD on 1/17/2025 at 7:36 PM          CT STROKE BRAIN (NO IV)(CPT=70450)    Result Date: 1/17/2025  CONCLUSION:  1. No acute intracranial hemorrhage or evidence of extended large vessel infarction. If there is clinical concern for acute ischemia, follow-up MRI suggested.   2. Postoperative changes of frontal craniotomy with anterior parafalcine aneurysm clipping.  3. Senescent changes of parenchymal volume loss with sequela of chronic microvascular ischemic disease.  4. There is large vessel atherosclerosis of the anterior and posterior intracranial circulations.  5. Lesser incidental findings as above.    This report was called immediately at 1915 hours to Emergency Department Pod 3 and discussed with the patient's ER physician, Dr. Cottrell.    Dictated by (CST): Sharath Gruber MD on  1/17/2025 at 7:11 PM     Finalized by (CST): Sharath Gruber MD on 1/17/2025 at 7:15 PM           Performed an independent visualization of head CT, CTA head and neck, MRI brain from 1/25/2025.  Also reviewed recent imaging throughout the week.  Reviewed multiple head CTs and CTAs as well as prior MRIs.  Imaging revealed: Agree with radiology read.    Education/Instructions given to: patient, daughter, granddaughter, daughter's   Barriers to Learning:None  Content: Refer to note above. Evaluation/Outcome: Verbalized understanding    Disclaimer:   This record was dictated using Dragon software. There may be errors due to voice recognition problems that were not realized and corrected during the completion of the note.      This document is not intended to support charting by exception.  Sections left blank in a completed note should be presumed not to have been done.      Upon my evaluation, this patient had a high probability of imminent or life-threatening deterioration due to critical findings of laboratory tests, critical findings of imaging, central nervous system failure, acute CVA/stroke , and bleeding diatheses, which required my direct attention, intervention, and personal management.    I have personally provided 35 minutes of critical care time, exclusive of time spent on separately billable procedures.  Time includes review of all pertinent laboratory/radiology results, discussion with consultants, and monitoring for potential decompensation.  Performed interventions included fluids and use of antithrombotics in the setting of multiple GI AVMs with risk of bleeding, discussion of goals of care including treatments that need to be monitored for toxicity including heparin drip with risk of bleeding from GI AVMs and recurrent embolic strokes due to aortic thrombus .        Thank you.  Joe Kang D.O.   Vascular & General Neurology    01/27/25

## 2025-01-27 NOTE — PROGRESS NOTES
[facility]     Gastroenterology Progress Note    Chester Bautista Patient Status:  Inpatient    1940 MRN [id number]   Location [facility] 3W/SW Attending Richard Guerra MD   Hosp Day # 9 PCP Heike Sorto MD       Subjective:   ISAAC overnight. Sitting up in chair, eating breakfast. She denies abdominal pain, n/v. Brown bowel movements. Denies melena or hematochezia.     Objective:   Blood pressure 156/83, pulse 95, temperature 97.9 °F (36.6 °C), temperature source Temporal, resp. rate 22, height 5' 1\" (1.549 m), weight 135 lb 9.6 oz (61.5 kg), SpO2 95%. Body mass index is 25.62 kg/m².    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    Admitted for acute infarcts, she is now on BRILINTA. Developed dark stools on  with drop in hgb after angiogram, stenting, procedural heparin. Received 1 unit prbc and hgb response sustained since then.      She is high risk for thrombotic events especially in light of recurrent strokes. At this point the benefits of anticoagulation >> risks of bleeding, if she warrants anticoagulation I would favor short acting agent to start and monitor for bleeding. If she incurs worsening anemia and more frequent transfusion requirements, would recommend tertiary care evaluation for balloon enteroscopy in hopes of ablating dominant AVM lesions.       Recommendations:  Continue octreotide 50 mcg sub-q TID while admitted  Brilinta to continue. Anticoagulation as above.   monitor hgb    Zoila Mcqueen MD  [facility] Gastroenterology          Results:     Lab Results   Component Value Date    WBC 17.2 (H) 01/27/2025    HGB 7.8 (L) 01/27/2025    HCT 25.0 (L) 01/27/2025    .0 01/27/2025    CREATSERUM 0.89 01/27/2025    BUN 8 (L) 01/27/2025     01/27/2025    K 3.5 01/27/2025    K 3.5 01/27/2025     01/27/2025    CO2 23.0 01/27/2025     (H) 01/27/2025    CA 9.1 01/27/2025    ALB 3.2 01/27/2025    ALKPHO 76 01/27/2025    BILT 0.4 01/27/2025    TP 6.3 01/27/2025    AST 20 01/27/2025    ALT <7 (L) 01/27/2025    PTT 26.6 01/25/2025    INR 1.04 01/25/2025    T4F 1.2 01/17/2025    TSH 0.418 (L) 01/17/2025    DDIMER 2.77 (H) 01/25/2025    ESRML >130 (H) 11/29/2024    CRP 3.00 (H) 11/29/2024    MG 1.5 (L) 01/27/2025    PHOS 4.0 01/22/2025    B12 814 11/01/2024       MRI BRAIN (CPT=70551)    Result Date: 1/25/2025  CONCLUSION:  1. Interval development of multiple acute infarcts with largest in the left parietal occipital region and left posterior temporal lobe..  Other smaller infarcts in the bilateral posterior frontal lobes, right paramedian posterior parietal lobe, right occipital lobe, left anterior frontal lobe. 2. Recent left thalamic lacunar infarct is unchanged.  3. One of recent cerebellar lacunar infarcts is much less conspicuous and others no longer restrict diffusion.  Other chronic cerebellar lacunar infarcts. 4. Post right frontal craniotomy for aneurysm clipping.    Dictated by (CST): Michael Gonzalez MD on 1/25/2025 at 6:22 PM     Finalized by (CST): Michael Gonzalez MD on 1/25/2025 at 6:31 PM          CTA BRAIN + CTA CAROTIDS (CPT=70496/12758)    Result Date: 1/25/2025  CONCLUSION:  1. No large vessel occlusion, hemodynamically significant stenosis, dissection, AVM or aneurysm. 2. Wall 3. Right frontal craniotomy for aneurysm clipping. 4.  Patent proximal left subclavian artery stent. 5. Changes of chronic small vessel disease in both cerebral hemispheres. 6. Irregular atherosclerotic plaque in the thoracic aorta. 7. Advanced emphysema with pleural parenchymal scarring.     Dictated by (CST): Michael Gonzalez MD on 1/25/2025 at 1:31 PM     Finalized by (CST): Michael Gonzalez MD on 1/25/2025 at 1:49 PM                 Endoscopy history:  3/9/2024 VCE video capsule exam attempted but unsuccessful due to capsule not leaving the stomach     2/28/2024 EGD examination with push enteroscopy Dr. Zoila Mcqueen:  Three nonbleeding AVMs ablated in the stomach  Over 6-7 \"small to medium sized\" nonbleeding AVMs seen but not cauterized in the duodenum  Plan initiated for octreotide injections     2/27/2024 EGD examination with push enteroscopy Dr. Zoila Mcqueen:  Two small AVM lesions in the stomach, 1 actively bleeding, both cauterized  Solid food in the stomach limited the exam that day; additional AVMs were suspected     12/21/2023 EGD examination with push enteroscopy Dr. Mikey Garcia:  Four tiny \" pinpoint\" AVM lesions cauterized in the duodenum, one actively bleeding     12/11/2023 VCE video capsule examination Dr. Mikey Garcia:  Small bowel passage time 4 hours 28 minutes with adequate prep  Multiple small bowel AVM lesions noted with 1 area of slow active bleeding     6/15/2023 EGD/colonoscopy examination Dr. Mikey Garcia:  Indication: Severe anemia  4mm gastric AVM lesion, cauterized  Three 2mm duodenal AVM lesions, cauterized  2mm cecal AVM lesion, cauterized  Pancolonic diverticulosis  Normal terminal ileum     7/21/2022 EGD/colonoscopy examinations Dr. Mikey Garcia:  Indication: Severe anemia  Two 2mm duodenal AVM lesions, cauterized  4mm cecal AVM lesion, cauterized  1 small 4mm colon polyp  Pancolonic diverticulosis  Normal terminal ileum

## 2025-01-27 NOTE — PHYSICAL THERAPY NOTE
PHYSICAL THERAPY TREATMENT NOTE - INPATIENT     Room Number: 217/217-A       Presenting Problem: cerebellar CVA  Co-Morbidities : COPD, diabetes, HTN, RA, GI bleed    Problem List  Principal Problem:    Cerebellar stroke (HCC)  Active Problems:    AVM (arteriovenous malformation) of small bowel, acquired with hemorrhage    Dizziness    Right hand paresthesia    Thrombus of aorta (HCC)    Bilateral pulmonary embolism (HCC)    Acute cystitis without hematuria    Small bowel bleed not requiring more than 4 units of blood in 24 hours, ICU, or surgery    Acute blood loss anemia    Cerebrovascular accident (CVA) due to embolism of precerebral artery (HCC)    Melena      PHYSICAL THERAPY ASSESSMENT   Patient demonstrates limited progress this session, goals  remain in progress.      Patient is requiring minimal assist as a result of the following impairments: decreased functional strength, impaired coordination, impaired motor planning, decreased muscular endurance, and fatigue .     Patient continues to function below baseline with bed mobility, transfers, and gait.  Next session anticipate patient to progress bed mobility, transfers, and gait.  Physical Therapy will continue to follow patient for duration of hospitalization.    Patient continues to benefit from continued skilled PT services: to facilitate return to prior level of function as patient demonstrates high motivation with excellent tolerance to an intensive therapy program .    PLAN DURING HOSPITALIZATION  Nursing Mobility Recommendation : 1 Assist  PT Device Recommendation: Rolling walker;Gait belt  PT Treatment Plan: Bed mobility;Body mechanics;Endurance;Energy conservation;Patient education;Family education;Neuromuscular re-educate;Gait training;Strengthening;Transfer training;Balance training  Frequency (Obs): 5x/week     SUBJECTIVE  \"I am going to take a nap!\"    OBJECTIVE  Precautions: Bed/chair alarm    WEIGHT BEARING RESTRICTION       PAIN ASSESSMENT    Ratin  Location: denies  Management Techniques: Activity promotion;Body mechanics;Repositioning (gentle/light soft tissue mobilization)    BALANCE  Static Sitting: Fair -  Dynamic Sitting: Fair -  Static Standing: Poor +  Dynamic Standing: Poor +    ACTIVITY TOLERANCE                          O2 WALK       AM-PAC '6-Clicks' INPATIENT SHORT FORM - BASIC MOBILITY  How much difficulty does the patient currently have...  Patient Difficulty: Turning over in bed (including adjusting bedclothes, sheets and blankets)?: A Little   Patient Difficulty: Sitting down on and standing up from a chair with arms (e.g., wheelchair, bedside commode, etc.): A Little   Patient Difficulty: Moving from lying on back to sitting on the side of the bed?: A Little   How much help from another person does the patient currently need...   Help from Another: Moving to and from a bed to a chair (including a wheelchair)?: A Little   Help from Another: Need to walk in hospital room?: A Little   Help from Another: Climbing 3-5 steps with a railing?: A Little     AM-PAC Score:  Raw Score: 18   Approx Degree of Impairment: 46.58%   Standardized Score (AM-PAC Scale): 43.63   CMS Modifier (G-Code): CK    FUNCTIONAL ABILITY STATUS  Functional Mobility/Gait Assessment  Gait Assistance: Not tested  Distance (ft): 0  Assistive Device: Rolling walker  Pattern:  (Slow lela, unsteady, trouble navigating walker)  Rolling: contact guard assist      Skilled Therapy Provided: Pt ok to be seen per MAGI Duarte. Pt educ in role of PT and PT POC, pt declining participation because she is tired and wants to take a nap. Pt educ in proper positioning in bed, agreeable to repositioning. Pt able to assist with bridging to scoot herself up in bed, still modA. Pt educ in supine therex, dtr present and stated she would ensure that her mother performed therex after her nap.    The patient's Approx Degree of Impairment: 46.58% has been calculated based on documentation in  the [facility] '6 clicks' Inpatient Daily Activity Short Form.  Research supports that patients with this level of impairment may benefit from home with HHPT, however, pt is performing below baseline after CVA and would benefit from intensive therapy for return to PLOF.  Final disposition will be made by interdisciplinary medical team.    THERAPEUTIC EXERCISES  Lower Extremity Ankle pumps  Heel slides  bridge     Position Supine       Patient End of Session: In bed;Needs met;Call light within reach;RN aware of session/findings;All patient questions and concerns addressed;Hospital anti-slip socks;Family present    CURRENT GOALS   Goals to be met by: 2/3/25 *date updated*  Patient Goal Patient's self-stated goal is: go home, be stronger   Goal #1 Patient is able to demonstrate supine - sit EOB @ level: modified independent      Goal #1   Current Status NT   Goal #2 Patient is able to demonstrate transfers Sit to/from Stand at assistance level: modified independent with walker - rolling      Goal #2  Current Status NT   Goal #3 Patient is able to ambulate 150 feet with assist device: walker - rolling at assistance level: supervision   Goal #3   Current Status NT   Goal #4 Patient will negotiate 10 stairs with rail and supervision   Goal #4   Current Status Not tested   Goal #5 Patient to demonstrate independence with home activity/exercise instructions provided to patient in preparation for discharge.   Goal #5   Current Status progressing       Therapeutic Activity: 9 minutes

## 2025-01-28 ENCOUNTER — APPOINTMENT (OUTPATIENT)
Dept: CT IMAGING | Facility: HOSPITAL | Age: 85
End: 2025-01-28
Attending: Other
Payer: MEDICARE

## 2025-01-28 ENCOUNTER — APPOINTMENT (OUTPATIENT)
Dept: GENERAL RADIOLOGY | Facility: HOSPITAL | Age: 85
End: 2025-01-28
Attending: INTERNAL MEDICINE
Payer: MEDICARE

## 2025-01-28 LAB
ALBUMIN SERPL-MCNC: 3.3 G/DL (ref 3.2–4.8)
ALBUMIN/GLOB SERPL: 1 {RATIO} (ref 1–2)
ALP LIVER SERPL-CCNC: 80 U/L
ALT SERPL-CCNC: <7 U/L
ANION GAP SERPL CALC-SCNC: 9 MMOL/L (ref 0–18)
APTT PPP: 102.7 SECONDS (ref 23–36)
APTT PPP: 32.1 SECONDS (ref 23–36)
APTT PPP: 58.8 SECONDS (ref 23–36)
AST SERPL-CCNC: 22 U/L (ref ?–34)
BASOPHILS # BLD AUTO: 0.06 X10(3) UL (ref 0–0.2)
BASOPHILS NFR BLD AUTO: 0.5 %
BILIRUB SERPL-MCNC: 0.5 MG/DL (ref 0.2–1.1)
BLOOD TYPE BARCODE: 7300
BUN BLD-MCNC: 8 MG/DL (ref 9–23)
BUN/CREAT SERPL: 8.4 (ref 10–20)
CALCIUM BLD-MCNC: 9.5 MG/DL (ref 8.7–10.4)
CHLORIDE SERPL-SCNC: 111 MMOL/L (ref 98–112)
CO2 SERPL-SCNC: 23 MMOL/L (ref 21–32)
CREAT BLD-MCNC: 0.95 MG/DL
DEPRECATED RDW RBC AUTO: 49.2 FL (ref 35.1–46.3)
EGFRCR SERPLBLD CKD-EPI 2021: 59 ML/MIN/1.73M2 (ref 60–?)
EOSINOPHIL # BLD AUTO: 0.45 X10(3) UL (ref 0–0.7)
EOSINOPHIL NFR BLD AUTO: 3.4 %
ERYTHROCYTE [DISTWIDTH] IN BLOOD BY AUTOMATED COUNT: 17.2 % (ref 11–15)
GLOBULIN PLAS-MCNC: 3.3 G/DL (ref 2–3.5)
GLUCOSE BLD-MCNC: 151 MG/DL (ref 70–99)
GLUCOSE BLDC GLUCOMTR-MCNC: 137 MG/DL (ref 70–99)
GLUCOSE BLDC GLUCOMTR-MCNC: 172 MG/DL (ref 70–99)
GLUCOSE BLDC GLUCOMTR-MCNC: 239 MG/DL (ref 70–99)
HCT VFR BLD AUTO: 23.4 %
HGB BLD-MCNC: 7.4 G/DL
IMM GRANULOCYTES # BLD AUTO: 0.1 X10(3) UL (ref 0–1)
IMM GRANULOCYTES NFR BLD: 0.8 %
LYMPHOCYTES # BLD AUTO: 2.05 X10(3) UL (ref 1–4)
LYMPHOCYTES NFR BLD AUTO: 15.4 %
MAGNESIUM SERPL-MCNC: 1.6 MG/DL (ref 1.6–2.6)
MCH RBC QN AUTO: 25 PG (ref 26–34)
MCHC RBC AUTO-ENTMCNC: 31.6 G/DL (ref 31–37)
MCV RBC AUTO: 79.1 FL
MONOCYTES # BLD AUTO: 1.25 X10(3) UL (ref 0.1–1)
MONOCYTES NFR BLD AUTO: 9.4 %
NEUTROPHILS # BLD AUTO: 9.39 X10 (3) UL (ref 1.5–7.7)
NEUTROPHILS # BLD AUTO: 9.39 X10(3) UL (ref 1.5–7.7)
NEUTROPHILS NFR BLD AUTO: 70.5 %
OSMOLALITY SERPL CALC.SUM OF ELEC: 297 MOSM/KG (ref 275–295)
PLATELET # BLD AUTO: 320 10(3)UL (ref 150–450)
POTASSIUM SERPL-SCNC: 3.6 MMOL/L (ref 3.5–5.1)
POTASSIUM SERPL-SCNC: 4.9 MMOL/L (ref 3.5–5.1)
PROT SERPL-MCNC: 6.6 G/DL (ref 5.7–8.2)
RBC # BLD AUTO: 2.96 X10(6)UL
SODIUM SERPL-SCNC: 143 MMOL/L (ref 136–145)
UNIT VOLUME: 316 ML
WBC # BLD AUTO: 13.3 X10(3) UL (ref 4–11)

## 2025-01-28 PROCEDURE — 71045 X-RAY EXAM CHEST 1 VIEW: CPT | Performed by: INTERNAL MEDICINE

## 2025-01-28 PROCEDURE — 99233 SBSQ HOSP IP/OBS HIGH 50: CPT | Performed by: INTERNAL MEDICINE

## 2025-01-28 PROCEDURE — 70450 CT HEAD/BRAIN W/O DYE: CPT | Performed by: OTHER

## 2025-01-28 PROCEDURE — 99291 CRITICAL CARE FIRST HOUR: CPT | Performed by: OTHER

## 2025-01-28 RX ORDER — HEPARIN SODIUM AND DEXTROSE 10000; 5 [USP'U]/100ML; G/100ML
INJECTION INTRAVENOUS CONTINUOUS
Status: DISCONTINUED | OUTPATIENT
Start: 2025-01-28 | End: 2025-01-31

## 2025-01-28 RX ORDER — HEPARIN SODIUM AND DEXTROSE 10000; 5 [USP'U]/100ML; G/100ML
14 INJECTION INTRAVENOUS ONCE
Status: COMPLETED | OUTPATIENT
Start: 2025-01-28 | End: 2025-01-28

## 2025-01-28 RX ORDER — CARVEDILOL 25 MG/1
25 TABLET ORAL 2 TIMES DAILY WITH MEALS
Status: DISCONTINUED | OUTPATIENT
Start: 2025-01-28 | End: 2025-02-04

## 2025-01-28 RX ORDER — HEPARIN SODIUM AND DEXTROSE 10000; 5 [USP'U]/100ML; G/100ML
12 INJECTION INTRAVENOUS ONCE
Status: DISCONTINUED | OUTPATIENT
Start: 2025-01-28 | End: 2025-01-28

## 2025-01-28 RX ORDER — ASPIRIN 81 MG/1
81 TABLET ORAL DAILY
Status: DISCONTINUED | OUTPATIENT
Start: 2025-01-28 | End: 2025-01-28

## 2025-01-28 RX ORDER — HEPARIN SODIUM AND DEXTROSE 10000; 5 [USP'U]/100ML; G/100ML
INJECTION INTRAVENOUS CONTINUOUS
Status: DISCONTINUED | OUTPATIENT
Start: 2025-01-28 | End: 2025-01-28

## 2025-01-28 RX ORDER — ASPIRIN 81 MG/1
81 TABLET ORAL DAILY
Status: DISCONTINUED | OUTPATIENT
Start: 2025-01-28 | End: 2025-01-31

## 2025-01-28 RX ORDER — LABETALOL HYDROCHLORIDE 5 MG/ML
10 INJECTION, SOLUTION INTRAVENOUS EVERY 4 HOURS PRN
Status: DISCONTINUED | OUTPATIENT
Start: 2025-01-28 | End: 2025-02-05

## 2025-01-28 RX ORDER — MAGNESIUM OXIDE 400 MG/1
400 TABLET ORAL ONCE
Status: COMPLETED | OUTPATIENT
Start: 2025-01-28 | End: 2025-01-28

## 2025-01-28 RX ORDER — POTASSIUM CHLORIDE 1500 MG/1
40 TABLET, EXTENDED RELEASE ORAL EVERY 4 HOURS
Status: COMPLETED | OUTPATIENT
Start: 2025-01-28 | End: 2025-01-28

## 2025-01-28 NOTE — CONSULTS
[facility]  part of [facility]      Chester Bautista Patient Status:  Inpatient    1940 MRN [id number]   Location [facility] 2W/SW Attending Bonny Carranza MD   Hosp Day # 10 PCP Heike Sorto MD       CC: Self-care mobility deficits status post CVA, SAH    History of Present Illness:  84-year-old female who is normally independent, has a past history of DVT/PE with IVC filter, anemia, hypertension, hyperlipidemia, diabetes mellitus, COPD, DJD who is normally independent not using an assistive device present to the hospital acute onset dizziness and emesis x 1.  She also developed right shoulder pain and right arm tingling.  As per the notes she had had an extended car ride in  to go to [place].  Blood pressures were found to be mildly elevated on admission.  With systolic pressures in the 170s.  She was found to have findings consistent with UTI.  Stroke workup was initiated and she was found to have 2 cerebellar infarcts as well as a large mural thrombus in the descending aorta.  She was also found to have small subsegmental PEs on CTA chest.  Patient did take 3 baby aspirin's on the way to the hospital.  She is seen by neurology, gastroenterology and pulmonology in consultation.  MRI confirmed small CVA in left thalamus.  She is found to have findings consistent with left posterior circulation stroke.  After MRI confirmed multiple strokes in her cerebellum.  Seen by vascular and ultimately went vertebral artery stenting.  Postoperative course complicated by blood loss anemia.  She did require 1 unit PRBC.  Follow-up MRI showed bihemispheric embolic strokes.  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  There was discussion due to risk of anticoagulation and therefore was initially started on Plavix and now switched to Brilinta.  She is still being monitored in the ICU.  Her symptoms have remarkably improved and  she has been able to participate remarkably very well with therapies.  Dizziness has started to improve.  She is using meclizine as needed.  There is discussion for possible anticoagulation in the future.  She has been started on octreotide while on inpatient with plans to switch her to GI prophylaxis on discharge.  H&H is being trended.  We were asked to see what the best way to approach her rehabilitation would be    Prescriptions Prior to Admission[1]  Allergies[2]  Past Medical History:    Anxiety state    Cancer (HCC)    breast cancer 2018    Cerebellar stroke (HCC)    Chronic obstructive pulmonary disease, unspecified (HCC)    Chronic obstructive pulmonary disease, unspecified (HCC)    COPD (chronic obstructive pulmonary disease) (HCC)    Cystic thyroid nodule    Diabetes (HCC)    Diabetes mellitus (HCC)    Essential hypertension    Exposure to medical diagnostic radiation    High blood pressure    High cholesterol    History of blood transfusion    low hemoglobin    Osteoarthritis    PONV (postoperative nausea and vomiting)    Pulmonary embolism (HCC)    Pulmonary emphysema (HCC)    Rheumatoid arthritis (HCC)     Past Surgical History:   Procedure Laterality Date    Cataract extraction w/  intraocular lens implant Bilateral 2017    Dr in [facility]     Colonoscopy      Colonoscopy N/A 07/21/2022    Procedure: COLONOSCOPY;  Surgeon: Mikey Garcia MD;  Location: [facility] ENDOSCOPY    Colonoscopy N/A 06/15/2023    Procedure: COLONOSCOPY;  Surgeon: Mikey Garcia MD;  Location: [facility] ENDOSCOPY    Correct bunion,simple      right foot  1993    Egd  12/21/2023    Dr. Garcia; Duodenal AVM's x4 cauterized    Hysterectomy      1972, no abnormal Paps, due to heavy bleeding with fibroids.  Not sure if it had bilateral salpingo-oophorectomy.    Ir aneurysm repairm  2010    Computer assisted volumetric craniofomy with clipping of anterior cerebral artery.    Lumpectomy right      Radiation right      Rotator cuff repair           Total knee replacement Right     Total knee replacement Left 2015    Transoral incisionless fundoplication - internal  2012 Fredy fundoplication at [facility] Dr. Fournier.    Fredy fundoplication at [facility] Dr. Fournier.    Trigger finger release      left hand      Yag capsulotomy - ou - both eyes Bilateral 2021    done in [place]     Social History     Socioeconomic History    Marital status:      Spouse name: Not on file    Number of children: Not on file    Years of education: Not on file    Highest education level: Not on file   Occupational History    Not on file   Tobacco Use    Smoking status: Former     Current packs/day: 0.00     Types: Cigarettes     Quit date:      Years since quittin.0     Passive exposure: Never    Smokeless tobacco: Never    Tobacco comments:     [id number]   Vaping Use    Vaping status: Never Used   Substance and Sexual Activity    Alcohol use: Never    Drug use: Never    Sexual activity: Not on file   Other Topics Concern    Not on file   Social History Narrative    Just moved in from [place].   passed away and that is why moved from [place] to be with daughter who lives in [place].  Currently lives with daughter.     Social Drivers of Health     Financial Resource Strain: Low Risk  (10/22/2024)    Financial Resource Strain     Difficulty of Paying Living Expenses: Not very hard     Med Affordability: No   Food Insecurity: No Food Insecurity (2025)    Food Insecurity     Food Insecurity: Never true   Transportation Needs: No Transportation Needs (2025)    Transportation Needs     Lack of Transportation: No     Car Seat: Not on file   Physical Activity: Not on file   Stress: Not on file   Social Connections: Not on file   Housing Stability: Low Risk  (2025)    Housing Stability     Housing Instability: No     Housing Instability Emergency: Not on file     Crib or Bassinette:  Not on file     Family History   Problem Relation Age of Onset    Heart Surgery Mother         Leaky valve at 41 y/o.     Prostate Cancer Brother     Cancer Brother     Diabetes Maternal Grandmother     Diabetes Paternal Aunt     Breast Cancer Self 78    Breast Cancer Niece         before 50    Macular degeneration Neg     Glaucoma Neg        PAIN SCALE: Denies    ACP: Full code    Review of Systems  No shortness of breath, chest pain, no dizziness at this time.  She has been able to sleep at night.  P.o. intake has been limited.  She feels she is doing well, however is somewhat wary of the future.  She denies any depression.  She has been able to have a bowel movement she has been able to urinate.  All assessment 12 point view are negative    Physical Exam  Temp:  [96.7 °F (35.9 °C)-98.2 °F (36.8 °C)] 96.7 °F (35.9 °C)  Pulse:  [] 93  Resp:  [16-22] 21  BP: (102-165)/(56-89) 143/74  SpO2:  [92 %-99 %] 92 %  92%    I/O last 3 completed shifts:  In: 2071.4 [I.V.:2071.4]  Out: 750 [Urine:750]  No intake/output data recorded.       CVS-S1-S2 RRR no ankle edema bilateral lower extremities  Extremities-warm to touch, peripheral pulses are palpable  Lymph nodes there is no palpable lymph nodes in neck or groin  Skin-intact denies unremarkable  MSK-bilateral upper and lower extremity strength is 5/5  Neurological exam-toes are downgoing sensation intact light touch vibration.  Alert and oriented x 3 good historian.  Pleasant mood and affect.    Previous Function: Independent, uses an assistive device on a as needed basis.  Lives with her daughter and son-in-law in a two-story home.  Was able to navigate the stairs independently.  States that she can stay on the first floor if she needs to.  Supportive family.  No recent history of EtOH abuse or smoking.    CURRENT FUNCTION:  AM-PAC Score:  Raw Score: 18   Approx Degree of Impairment: 46.58%   Standardized Score (AM-PAC Scale): 43.63   CMS Modifier (G-Code): CK      FUNCTIONAL ABILITY STATUS  Functional Mobility/Gait Assessment  Gait Assistance: Not tested  Distance (ft): 0  Assistive Device: Rolling walker  Pattern:  (Slow lela, unsteady, trouble navigating walker)  Rolling: contact guard assist    Labs  Lab Results   Component Value Date    WBC 13.3 (H) 01/28/2025    HGB 7.4 (L) 01/28/2025    HCT 23.4 (L) 01/28/2025    MCV 79.1 (L) 01/28/2025    .0 01/28/2025     Lab Results   Component Value Date    PTT 58.8 (H) 01/28/2025     No results found for: \"PROTIME\"  Lab Results   Component Value Date     01/28/2025    K 3.6 01/28/2025    CO2 23.0 01/28/2025     01/28/2025    BUN 8 (L) 01/28/2025       Radiology:  XR CHEST AP PORTABLE  (CPT=71045)    Result Date: 1/28/2025  PROCEDURE: XR CHEST AP PORTABLE  (CPT=71045) TIME:    COMPARISON: [facility], XR CHEST AP PORTABLE (CPT=71045), 1/25/2025, 3:18  INDICATIONS: Shortness of breath; Follow up. Essential Hypertension  TECHNIQUE:   Single view.   FINDINGS:  CARDIAC/VASC: Cardiomegaly.  Vascular margins obscured mediastinal vascular stent. MEDIAST/DYLON:   Atherosclerotic aorta with no visible aneurysm.  Left innominate central venous catheter with the tip in the SVC.  Right central venous Port-A-Cath with the tip in the distal SVC. LUNGS/PLEURA: Extensive bilateral mixed alveolar and interstitial multifocal airspace opacification with interval progression. BONES: Mild scoliosis with mild degenerative disc disease and spondylosis.  OTHER: Negative.          CONCLUSION:  1. Cardiomegaly tortuous thoracic aorta 2. Bilateral mixed alveolar and interstitial multifocal airspace opacification with interval progression. 3. Bilateral central venous catheters with the tip in the distal SVC near the atrial junction.  No pneumothorax.  Left-sided Mediastinal vascular stent in place.     Dictated by (CST): Louis Moreno MD on 1/28/2025 at 8:25 AM     Finalized by (CST): Louis Moreno MD on  1/28/2025 at 8:29 AM          US VENOUS DOPPLER LEG BILAT - DIAG IMG (CPT=93970)    Result Date: 1/27/2025  PROCEDURE: US VENOUS DOPPLER LEG BILAT-DIAG IMG (CPT=93970)  COMPARISON: [facility], US VENOUS DOPPLER LEG BILAT-DIAG IMG (CPT=93970), 1/24/2025, 6:25 PM.  INDICATIONS: Shortness of breath.  Right lower extremity weakness.  History of DVT and pulmonary embolus.  TECHNIQUE: Color duplex Doppler venous ultrasound of both lower extremities was performed in the  usual manner.  FINDINGS:   The femoral and popliteal veins appear normal.  Normal flow was demonstrated with color and pulsed Doppler.  Visualized portions of the great and small saphenous, posterior tibial and peroneal veins appear normal.    THROMBI: None visible. COMPRESSIBILITY:   Normal. OTHER: Negative.         CONCLUSION: No evidence of left or right lower extremity DVT.   Dictated by (CST): Fermín Castro MD on 1/27/2025 at 9:51 PM     Finalized by (CST): Fermín Castro MD on 1/27/2025 at 9:53 PM          XR PORT VENOUS W FLUORO INJ (WAU=50468)    Result Date: 1/27/2025  PROCEDURE: XR PORT VENOUS W FLUORO INJ EM  COMPARISON: None.  INDICATIONS: No blood return from port.  FINDINGS:    radiograph was obtained and demonstrated the right IJ Port-A-Cath to be in good position.  The Rubin needle was within the central aspect of the port.  Attempts at aspiration were made but were unsuccessful.  5 cc of contrast was then gently injected into the port during which time fluoroscopy was performed.  This demonstrated a fibrin sheath around the distal 6 cm of the port.  Contrast was demonstrated to flow beyond the fibrin sheath into the right atrium.   However aspiration was unable to be performed secondary to the fibrin sheath.  The port was then flushed and heparin was read installed into the port.  Fluoroscopic time:  0.2 minutes.   Number of images obtained:  22         CONCLUSION: Fibrin sheath around the distal 5 cm segment of the  Port-A-Cath resulting in inability to aspirate.  Please note, however, that the Port-A-Cath is in good position and can be used for injection as contrast/fluid is able to pass around the fibrin sheath into the right atrium.    Dictated by (CST): Parker Keane MD on 1/27/2025 at 1:52 PM     Finalized by (CST): Parker Keane MD on 1/27/2025 at 1:55 PM          CT BRAIN OR HEAD (CPT=70450)    Result Date: 1/25/2025  PROCEDURE: CT BRAIN OR HEAD (CPT=70450)  COMPARISON: [facility], MRI BRAIN (CPT=70551), 1/25/2025, 4:57 PM.  [facility], CTA BRAIN + CTA CAROTIDS (CPT=70496/32691), 1/25/2025, 12:46 PM.  [facility], CT BRAIN OR HEAD (CPT=70450), 1/24/2025, 6:09 PM.  INDICATIONS: Follow-up stroke eval evolving sah vs contrast extravasation  TECHNIQUE: CT images were obtained without contrast material.  Automated exposure control for dose reduction was used.  Dose information is transmitted to the ACR (American College of Radiology) NRDR (National Radiology Data Registry) which includes the Dose Index Registry.  FINDINGS:  CSF SPACES: No hemorrhage, hydrocephalus, mass or midline shift. CEREBRUM: Left parietal occipital cortical edema and subtle left posterior temporal cortical edema.  Other areas of recent infarct seen on MRI are not detectable with certainty.  No hemorrhage or mass. CEREBELLUM: No edema, hemorrhage, mass or acute infarct. BRAINSTEM: No edema, hemorrhage, mass, acute infarction, or significant atrophy.  CALVARIUM: Right frontal craniotomy. SINUSES: Limited views demonstrate no significant mucosal thickening or fluid.  ORBITS: Previous bilateral cataract surgery.  OTHER: Atherosclerotic calcification cavernous carotid and vertebral arteries.  Artifact related to anterior cerebral para falcine aneurysm clipping.         CONCLUSION:  1. No intracranial hemorrhage. 2. Acute left parietal occipital cortical infarct and posterior temporal cortical infarct.   Other acute infarcts seen on the recent MRI are not detectable with certainty. 3. Right frontal craniotomy and para falcine aneurysm clipping.  New line large vessel intracranial atherosclerosis.    Dictated by (CST): Michael Gonzalez MD on 1/25/2025 at 7:43 PM     Finalized by (CST): Michael Gonzalez MD on 1/25/2025 at 7:48 PM          MRI BRAIN (CPT=70551)    Result Date: 1/25/2025  PROCEDURE: MRI BRAIN (CPT=70551)  COMPARISON: [facility], MRI BRAIN (CPT=70551), 1/19/2025, 10:36 PM.  [facility], CTA BRAIN + CTA CAROTIDS (CPT=70496/44982), 1/25/2025, 12:46 PM.  INDICATIONS: Follow-up stroke eval new left mca stroke  TECHNIQUE: A variety of imaging planes and parameters were utilized for visualization of suspected pathology.  Images were performed without contrast.  FINDINGS:  Evaluation is degraded by patient-related motion artifact. CEREBRUM: Multiple areas of restricted diffusion with largest measuring 3.6 cm in the left parietal occipital region.  A 2.7 cm cortical focus of restricted diffusion in left posterior temporal lobe.  a 1.4 cm focus of cortical restricted diffusion left posterior frontal lobe, small 1 cm focus in the anterior left frontal lobe, left basal ganglia, a punctate focus of restricted diffusion in the right posterior frontal lobe, and right paramedian posterior parietal lobe small foci of measuring up to 1 cm.   A 1 cm focus of restricted diffusion in the right occipital cortex and a smaller more medial focus. CEREBELLUM: A less conspicuous focus of restricted diffusion in the left cerebellar hemisphere on image 37 series 6. Bilater few lacunar infarcts. BRAINSTEM: No edema, hemorrhage, mass or acute infarct. CSF SPACES: No hydrocephalus, subarachnoid hemorrhage, or mass.  SKULL: Previous right frontal craniotomy. SINUSES: Limited views demonstrate no significant mucosal thickening or fluid.  ORBITS: Limited views are unremarkable.  OTHER: Parafalcine aneurysm  clipping accounts for significant ferromagnetic artifact.         CONCLUSION:  1. Interval development of multiple acute infarcts with largest in the left parietal occipital region and left posterior temporal lobe..  Other smaller infarcts in the bilateral posterior frontal lobes, right paramedian posterior parietal lobe, right occipital lobe, left anterior frontal lobe. 2. Recent left thalamic lacunar infarct is unchanged.  3. One of recent cerebellar lacunar infarcts is much less conspicuous and others no longer restrict diffusion.  Other chronic cerebellar lacunar infarcts. 4. Post right frontal craniotomy for aneurysm clipping.    Dictated by (CST): Michael Gonzalez MD on 1/25/2025 at 6:22 PM     Finalized by (CST): Michael Gonzalez MD on 1/25/2025 at 6:31 PM          CTA BRAIN + CTA CAROTIDS (CPT=70496/68455)    Result Date: 1/25/2025  PROCEDURE: CTA BRAIN + CTA CAROTIDS (CPT=70496/15058)  COMPARISON: [facility], CT BRAIN OR HEAD (CPT=70450), 1/24/2025, 6:09 PM.  [facility], MRI BRAIN (CPT=70551), 1/19/2025, 10:36 PM.  [facility], CT STROKE CTA BRAIN/CTA NECK (WIV) (CPT=70496/00793), 1/17/2025, 7:00 PM.  [facility], CT STROKE BRAIN (NO IV) (CPT=70450), 1/17/2025, 7:00 PM.  [facility], CT BRAIN OR HEAD (CPT=70450), 1/25/2025, 6:22 AM.  INDICATIONS: New episode of aphasia.  TECHNIQUE:   CT images of the neck and brain were obtained with non-ionic intravenous contrast material. Multi-planar reformatted/3-D images were created to optimize visualization of vascular anatomy. Automated exposure control for dose reduction was used. Dose information is transmitted to the ACR (American College of Radiology) NRDR (National Radiology Data Registry) which includes the Dose Index Registry.Evaluation of internal carotid stenosis is based on NASCET Criteria.   FINDINGS: Atherosclerotic calcification of proximal right internal carotid, and cavernous  carotids without significant stenosis. CAROTID ARTERIES: RIGHT COMMON CAROTID: No hemodynamically significant stenosis or dissection.  RIGHT INTERNAL CAROTID: No hemodynamically significant stenosis or dissection.   LEFT COMMON CAROTID: No hemodynamically significant stenosis or dissection.  LEFT INTERNAL CAROTID: No hemodynamically significant stenosis or dissection.   VERTEBRAL ARTERIES:  Atherosclerotic calcification of V4 segments without significant stenosis. RIGHT: No hemodynamically significant stenosis or dissection.  LEFT: No hemodynamically significant stenosis or dissection.   BASILAR ARTERY: No hemodynamically significant stenosis or dissection.     AORTIC ARCH (Limited): Irregular atherosclerotic plaque in the aortic arch.  Interval placement of a patent left subclavian artery origin stent .  Atherosclerotic calcification.  No aneurysm or dissection.  Innominate, and subclavian arteries are patent without significant stenosis.  MEDIASTINUM/APICES (Limited): Advanced emphysema with coexistent pleural parenchymal scarring-unchanged.  OTHER: Stable bilateral thyroid nodules with the largest measuring 1.3 cm.  Based on ACR guidelines, incidentally detected thyroid nodules measuring less than 1.5 cm do not require additional imaging evaluation.  Multilevel moderate to advanced disc and facet degeneration.  INTRACRANIAL ARTERIES: ANTERIOR CEREBRALS:  No significant stenosis.  No visible aneurysm or vascular malformation.  MIDDLE CEREBRALS: Diminished vascularity in the distal posterior division branches of MCA at site of left parietal infarct.  No significant stenosis.  No visible aneurysm or vascular malformation. POSTERIOR CEREBRALS: No significant stenosis.  No visible aneurysm or vascular malformation.  OTHER FINDINGS:  Right frontal craniotomy for para falcine anterior cerebral artery aneurysm clipping. Wedge-shaped 3 cm region edema in the left parietal lobe is more conspicuous relative to prior.  No  intracranial hemorrhage.  Stable changes of chronic small vessel disease in cerebral white matter.  Lacunar infarcts seen in the left thalamus, and in the bilateral cerebellum on recent MRI are not detectable with certainty.         CONCLUSION:  1. No large vessel occlusion, hemodynamically significant stenosis, dissection, AVM or aneurysm. 2. Wall 3. Right frontal craniotomy for aneurysm clipping. 4. Patent proximal left subclavian artery stent. 5. Changes of chronic small vessel disease in both cerebral hemispheres. 6. Irregular atherosclerotic plaque in the thoracic aorta. 7. Advanced emphysema with pleural parenchymal scarring.     Dictated by (CST): Michael Gonzalez MD on 1/25/2025 at 1:31 PM     Finalized by (CST): Michael Gonzalez MD on 1/25/2025 at 1:49 PM          XR CHEST AP PORTABLE  (CPT=71045)    Result Date: 1/25/2025  PROCEDURE: XR CHEST AP PORTABLE  (CPT=71045) TIME: 96751 hours.   COMPARISON: [facility], XR CHEST AP/PA (1 VIEW) (CPT=71045), 1/21/2025, 4:16 PM.  [facility], XR CHEST AP PORTABLE (CPT=71045), 1/20/2025, 0:03 AM.  [facility], XR CHEST AP PORTABLE (CPT=71045), 1/17/2025, 7:14 PM.  INDICATIONS: CVC placement.  TECHNIQUE:   Single view.   FINDINGS: CARDIAC/VASC: Unchanged mild cardiomegaly.  Pulmonary vascularity is partially obscured. MEDIAST/DYLON:   No visible mass or adenopathy. LUNGS/PLEURA: Emphysema with coexistent pulmonary fibrosis.  Left retrocardiac opacity, likely similar to prior.  Mild blunting of the left costophrenic angle, unchanged.  Right pleural spaces appear clear. BONES: No acute fracture or suspicious bone lesion. OTHER: Right Port-A-Cath with tip near RA SVC junction, unchanged.  Left internal jugular central venous catheter with the tip projecting over the right atrium.  Metallic stenting material projects over the upper mediastinum, new from prior.          CONCLUSION:   Left internal jugular central venous catheter tip  over the right atrium.  Metallic stent over the upper mediastinum, new from prior.  Correlate with recent intervention.  Background of pulmonary fibrosis.  Small left pleural effusion with left basilar opacity which may represent subsegmental atelectasis and/or other superimposed process.  A preliminary report was issued by the [facility] teleradiology service. There are no clinically actionable discrepancies.    Dictated by (CST): Gisselle Mendez MD on 1/25/2025 at 9:08 AM     Finalized by (CST): Gisselle Mendez MD on 1/25/2025 at 9:10 AM          CT ABDOMEN+PELVIS(CPT=74176)    Result Date: 1/24/2025  PROCEDURE:   CT ABDOMEN+PELVIS(CPT=74176)  COMPARISON:   [facility], CTA CHEST+CTA ABDOMEN DISSECT SET (CPT=71275/67893), 1/17/2025, 9:23 PM.  INDICATIONS:   Left lower quadrant pain.  Recent left femoral access angiogram.  r/o RP bleed, femoral artery hematoma  TECHNIQUE: CT images of the abdomen and pelvis were obtained without intravenous contrast material.  Automated exposure control for dose reduction was used. Adjustment of the mA and/or kV was done based on the patient's size. Use of iterative reconstruction technique for dose reduction was used.  Dose information is transmitted to the ACR (American College of Radiology) NRDR (National Radiology Data Registry) which includes the Dose Index Registry.  FINDINGS:  LIVER: Normal unenhanced liver. BILIARY: Vicarious excretion of contrast in the gallbladder.  No evidence for cholecystitis or biliary dilatation. SPLEEN: Normal unenhanced spleen.  PANCREAS: Normal unenhanced pancreas.  ADRENALS: Normal unenhanced adrenals.  KIDNEYS: A 3.9 cm simple left interpolar renal cyst and few too small to characterize renal lesions which likely represent cysts.  Duplication of right renal collecting system.  Contrast excretion into normal caliber renal collecting systems from the recent angiogram. AORTA/VASCULAR: No aneurysm. Atherosclerotic vascular  calcification including coronary artery calcification. LYMPHADENOPATHY: None. GI/MESENTERY: Moderate hiatal hernia.  Normal appendix.  Colonic diverticulosis.  No obstruction, bowel wall thickening or mesenteric mass.  ABDOMINAL WALL: No mass or hernia.  URINARY BLADDER: Normal contrast distended urinary bladder. ASCITES:    None. PELVIC ORGANS: Calcified degenerated uterine fibroids. BONES: No suspect bone lesion or acute fracture.  Mild chronic anterior wedging midthoracic vertebral bodies.  Degenerative changes in the spine and hips.. LUNG BASES: Emphysema with scarring in both lungs.  Partly visualized venous catheter with tip at the RA SVC junction. OTHER: Mild ill-defined subcutaneous hematoma around the left femoral artery and small amount of haziness in the left hemipelvic retroperitoneal fat extending into the iliac fossa, but no large hematoma.  Atherosclerotic calcification femoral artery, but  no finding to indicate pseudoaneurysm.  Soft tissue density in left inguinal femoral region measuring 20 x 17 mm measures 60 Hounsfield units and could represent a focal superficial hematoma or less likely a lymph node.         CONCLUSION:  1. Small amount of ill-defined hematoma in left inguinal femoral region extending into left hemipelvis with slight haziness to the fat in the iliac fossa.  No large hematoma. 2. Contrast excretion into normal renal collecting systems and contrast distended urinary bladder from the recent angiogram. 3. Emphysema and scarring in both lungs.    Dictated by (CST): Michael Gonzalez MD on 1/24/2025 at 11:42 PM     Finalized by (CST): Michael Gonzalez MD on 1/24/2025 at 11:57 PM          US VENOUS DOPPLER LEG BILAT - DIAG IMG (CPT=93970)    Result Date: 1/24/2025  PROCEDURE: US VENOUS DOPPLER LEG BILAT-DIAG IMG (CPT=93970)  COMPARISON: [facility], US VENOUS DOPPLER LEG BILAT-DIAG IMG (CPT=93970), 1/18/2025, 0:29 AM.  INDICATIONS: Bilateral lower leg pain and swelling.   TECHNIQUE: Color duplex Doppler venous ultrasound of both lower extremities was performed in the  usual manner.  FINDINGS: The femoral and popliteal veins appear normal bilaterally. Visualized portions of the great and small saphenous, and posterior tibial veins in both lower extremities have a sonographically unremarkable appearance. The peroneal veins are not visualized.  GRAYSCALE: There are no visible echogenic thrombi in either lower extremity. Compressibility of both the venous systems is demonstrated. COLOR DOPPLER: Flow is present throughout the imaged veins of both lower extremities. SPECTRAL:  Where interrogated, augmentation is visualized on compression.          CONCLUSION:  Negative for sonographic evidence of deep venous thrombosis in either lower extremity.    Dictated by (CST): Sharath Gruber MD on 1/24/2025 at 7:21 PM     Finalized by (CST): Sharath Gruber MD on 1/24/2025 at 7:22 PM          CT BRAIN OR HEAD (CPT=70450)    Result Date: 1/24/2025  PROCEDURE: CT BRAIN OR HEAD (CPT=70450)  COMPARISON: [facility], CTA BRAIN (CPT=70496), 12/09/2021, 3:11 PM.  [facility], CT STROKE CTA BRAIN/CTA NECK (WIV) (CPT=70496/80943), 1/17/2025, 7:00 PM.  [facility], CT STROKE BRAIN (NO IV) (CPT=70450), 1/17/2025, 7:00 PM.  [facility], CT BRAIN OR HEAD (CPT=70450), 3/03/2024, 12:06 PM.  [facility], CT BRAIN OR HEAD (CPT=70450), 12/03/2021, 2:50 PM.  [facility], CT BRAIN OR HEAD (CPT=70450), 1/24/2025, 11:20 AM.  [facility], MRI BRAIN (CPT=70551), 1/19/2025, 10:36 PM.  [facility], CT STROKE BRAIN (NO IV) (CPT=70450), 1/19/2025, 9:24 AM.  [facility], MRI BRAIN WO ACUTE (3) SEQUENCE(CPT=70551), 1/17/2025, 9:33 PM.  INDICATIONS: Follow-up stroke.  TECHNIQUE: CT images were obtained without contrast material. Automated exposure control for dose reduction was used. Dose  information was transmitted to the ACR (American College of Radiology) NRDR (National Radiology Data Registry), which includes the Dose Index Registry.   FINDINGS:  CSF SPACES: Residual hyperdense subarachnoid hemorrhage is suspected interdigitating in the left frontal high convexity and involving the occipital lobes bilaterally.   In the anterior parafalcine region, an aneurysm clip is evident. The ventricles, cisterns, and sulci are dilated, but remain commensurate in caliber, consistent with parenchymal volume loss of a degree that is appropriate for age. No hydrocephalus or effacement of the basal cisterns is appreciated. There is no extra-axial fluid collection. CEREBRUM: There is metallic streak artifact from aneurysm clipping resulting in obscuration of adjacent structures. No acute intraparenchymal hemorrhage is clearly evident. Mild cerebral edema is perceived in the occipital regions, and mild, diffuse cortical sulcal effacement is apparent in the left frontal and bilateral occipital regions. There is no space-occupying lesion, mass effect, or shift of midline structures. The gray-white matter junction is preserved and bilaterally symmetric in appearance.  Scattered periventricular and subcortical white matter hypodensities are present, consistent with chronic microvascular ischemic disease. CEREBELLUM: Hyperdense foci are redemonstrated left cerebellar hemisphere. There is cerebellar folial expansion consistent with atrophy.  BRAINSTEM: Patchy areas of low attenuation likely reflect sequela of chronic small vessel ischemic disease. No edema, hemorrhage, mass, or acute infarction is seen. There is commensurate atrophy.  CALVARIUM: Postoperative changes of frontal craniotomy are evident. Morphologic changes of hyperostosis frontalis interna are suggested. SINUSES: Limited views demonstrate no significant mucosal thickening or fluid. Right larger than left ashley bullosa deformities are present.   ORBITS: Limited views are notable for postoperative changes of the lenses bilaterally.  OTHER: Anterior parafalcine aneurysm clipping is perceived. Atherosclerotic vascular calcifications are perceived in the cavernous carotid and distal vertebral arteries. Mild adenoidal hypertrophy is noted. The patient appears largely edentulous on the  view          CONCLUSION:  1. Subarachnoid hemorrhage in the left frontal high convexity in bilateral occipital lobes with mild cerebral edema. Continued surveillance is recommended.  2. Postoperative changes of frontal craniotomy and anterior parafalcine aneurysm clipping.  3. Senescent changes of parenchymal volume loss with sequela of chronic microvascular ischemic disease.  4. There is large vessel atherosclerosis involving the anterior and posterior intracranial circulations.  5. Lesser incidental findings as above.      Results of this examination were conveyed to the patient's physician, Dr. Ronald March, by Dr. Gruber at 1838 on 01/24/2025.   Dictated by (CST): Sharath Gruber MD on 1/24/2025 at 6:35 PM     Finalized by (CST): Sharaht Gruber MD on 1/24/2025 at 6:41 PM          CT BRAIN OR HEAD (CPT=70450)    Result Date: 1/24/2025  PROCEDURE: CT BRAIN OR HEAD (CPT=70450)  COMPARISON: [facility], CT BRAIN OR HEAD (CPT=70450), 3/03/2024, 12:06 PM.  [facility], CT STROKE BRAIN (NO IV) (CPT=70450), 1/19/2025, 9:24 AM.  [facility], MRI BRAIN WO ACUTE (3) SEQUENCE(CPT=70551), 1/17/2025, 9:33 PM.  [facility], CT STROKE CTA BRAIN/CTA NECK (WIV) (CPT=70496/16759), 1/17/2025, 7:00 PM.  [facility], CT STROKE BRAIN (NO IV) (CPT=70450), 1/17/2025, 7:00 PM.  [facility], MRI BRAIN (CPT=70551), 1/19/2025, 10:36 PM.  INDICATIONS: Severe Headache, concerns for SAH  TECHNIQUE: CT images were obtained without contrast material.  Automated exposure control for dose reduction was used.  Dose  information is transmitted to the ACR (American College of Radiology) NRDR (National Radiology Data Registry) which includes the Dose Index Registry.  FINDINGS:  There is retained contrast material throughout the large intracranial vessels and cerebral sulci bilaterally.  Please note that this finding limits evaluation for detection of potential acute intracranial hemorrhage.  No gross acute intracranial hemorrhage is identified.  Subcentimeter enhancing foci in the left cerebellum correspond to known subacute lacunar infarcts.  Gray-white matter differentiation is otherwise grossly well maintained.  No hydrocephalus.  No inferior cerebellar tonsillar herniation.  Stable midline frontal craniotomy and anterior parafalcine aneurysm clipping. Atherosclerotic vascular calcifications are perceived in the carotid siphons and distal vertebral arteries.  Moderate adenoid enlargement noted.  Limited views of the orbits are notable for postoperative changes of both lenses.         CONCLUSION:  1. Retained contrast material throughout the large intracranial vessels and cerebral sulci from same date cardiac angiogram.  Please note that this finding limits evaluation for detection of potential acute intracranial hemorrhage.  Aforementioned sulcal  enhancement most likely relates to a contrast staining.  However, if symptoms or strong clinical suspicion for acute intracranial hemorrhage persist, a short interval 6-12 hour follow-up head CT is recommended. 2. Subcentimeter enhancing foci in the left cerebellum (x2) likely relate to known enhancing subacute lacunar type infarcts.  Continued follow-up to ensure resolution of enhancement is advised. 3. Midline frontal craniotomy and anterior parafalcine aneurysm clipping. 4. Intracranial atherosclerosis. 5. Stable chronic moderate adenoid enlargement.  elm-remote  Dictated by (CST): Jose Enrique Valente MD on 1/24/2025 at 11:33 AM     Finalized by (CST): Jose Enrique Valente MD on 1/24/2025  at 11:40 AM          CATH ANGIO    Result Date: 1/24/2025  This exam has been completed. Please refer to Notes for the results to this procedure.    XR CHEST AP/PA (1 VIEW) (CPT=71045)    Result Date: 1/21/2025  PROCEDURE: XR CHEST AP/PA (1 VIEW) (CPT=71045)  COMPARISON: [facility], XR CHEST PA + LAT CHEST (OXO=80063), 1/03/2025, 4:24 PM.  [facility], XR CHEST AP PORTABLE (CPT=71045), 1/20/2025, 0:03 AM.  INDICATIONS: shortness of breath  TECHNIQUE:   Single PA view.   FINDINGS: CARDIAC/VASC: Stable cardiomegaly.  Pulmonary vascularity is partially obscured. MEDIAST/DYLON:   No visible mass or adenopathy. LUNGS/PLEURA: Emphysema with coexistent pulmonary fibrosis. BONES: No acute fracture or suspicious bone lesion. OTHER: Right Port-A-Cath with tip near RA SVC junction.         CONCLUSION:  1. No significant change. 2. Pulmonary fibrosis. 3. Cardiomegaly.    Dictated by (CST): Michael Gonzalez MD on 1/21/2025 at 5:23 PM     Finalized by (CST): Michael Gonzalez MD on 1/21/2025 at 5:25 PM          MRI BRAIN (CPT=70551)    Result Date: 1/20/2025  PROCEDURE: MRI BRAIN (CPT=70551)  COMPARISON: [facility], CT CHEST PE AORTA (IV ONLY) (CPT=71260), 10/17/2024, 5:39 PM.  [facility], CT STROKE BRAIN (NO IV) (CPT=70450), 1/17/2025, 7:00 PM.  [facility], CT STROKE CTA BRAIN/CTA NECK (WIV) (CPT=70496/63740), 1/17/2025, 7:00 PM.  [facility], CTA BRAIN (CPT=70496), 12/09/2021, 3:11 PM.  [facility], CT BRAIN OR HEAD (CPT=70450), 12/03/2021, 2:50 PM.  [facility], CT BRAIN OR HEAD (CPT=70450), 3/03/2024, 12:06 PM.  [facility], MRI BRAIN WO ACUTE (3) SEQUENCE(CPT=70551), 1/17/2025, 9:33 PM.  [facility], CT STROKE BRAIN (NO IV) (CPT=70450), 1/19/2025, 9:24 AM.  INDICATIONS: Focal psychomotor deficit. Stroke.  TECHNIQUE: A variety of imaging planes and parameters were  utilized for visualization of suspected pathology.  Images were performed without contrast.  FINDINGS:  CSF SPACES: The ventricles, cisterns, and sulci are dilated, but remain commensurate in caliber, consistent with parenchymal volume loss of a degree that is appropriate for age. No hydrocephalus, subarachnoid hemorrhage, or effacement of the basal cisterns is appreciated. There is no extra-axial fluid collection. CEREBRUM: There is a punctate focus of diffusion restriction of the left thalamus is new, suggesting acute or subacute ischemia.  There are minimal periventricular and subcortical white matter T2 hyperintensities. No further focal signal abnormality of the gray or white matter is perceived.  No intraparenchymal hemorrhage, edema, or cortical sulcal effacement is apparent. There is no space-occupying lesion, mass effect, or shift of midline structures. CEREBELLUM: Foci of diffusion restriction in the left cerebellar hemisphere compatible with acute/subacute infarction. Concurrent edema is present. No hemorrhage or mass is seen. There is cerebellar folial expansion consistent with atrophy.  BRAINSTEM: The craniocervical junction is uncompromised.  CALVARIUM: Postoperative changes of frontal craniotomy are suggested. Morphologic changes of hyperostosis frontalis interna are suggested. SINUSES: Limited views demonstrate no significant mucosal thickening or fluid. Small ashley bullosa deformities are present.  ORBITS: Limited views are notable for postoperative changes of the lenses bilaterally.  OTHER: Extensive susceptibility artifact is present in the anterior parasagittal region related to prior aneurysm clipping. The flow voids of the major intracranial vessels are visualized.           CONCLUSION:  1. Interval development of a punctate lacunar infarct of the left thalamus, concerning for acute ischemia.  2. Subacute lacunar infarcts of the left cerebellar hemisphere.   3. Postoperative changes of frontal  craniotomy and parafalcine aneurysm clipping.  4. Senescent changes of parenchymal volume loss with mild sequela of chronic microvascular ischemic disease.  5. Lesser incidental findings as above.      A preliminary report was issued by the [facility] teleradiology service. There are no major discrepancies.  elm-remote.   Dictated by (CST): Sharath Gruber MD on 1/20/2025 at 9:24 AM     Finalized by (CST): Sharath Gruber MD on 1/20/2025 at 9:31 AM          XR CHEST AP PORTABLE  (CPT=71045)    Result Date: 1/20/2025  PROCEDURE: XR CHEST AP PORTABLE  (CPT=71045) TIME: 0005.   COMPARISON: [facility], XR CHEST AP PORTABLE (CPT=71045), 1/17/2025, 7:14 PM.  [facility], XR CHEST PA + LAT CHEST (NOE=52615), 1/03/2025, 4:24 PM.  [facility], XR CHEST AP PORTABLE (CPT=71045), 10/22/2024, 1:18 PM.  [facility], XR CHEST AP PORTABLE (CPT=71045), 10/17/2024, 2:36 PM.  [facility], CT CHEST PE AORTA (IV ONLY) (CPT=71260), 10/17/2024, 5:39 PM.  INDICATIONS: Verify correct port placement.  TECHNIQUE:   Single view.   FINDINGS:  DEVICES: There is an accessed right-sided Port-A-Cath with tip projecting near the cavoatrial junction. CARDIAC/VASC: The cardiomediastinal silhouette is accentuated by AP technique, but likely stably enlarged. There is mild tortuosity of the thoracic aorta with peripheral atherosclerotic vascular calcification. The pulmonary vascularity is within normal limits. MEDIAST/DYLON: No visible mass or adenopathy. LUNGS/PLEURA: There is slight elevation of the right hemidiaphragm. The lungs are hyperexpanded. There are relative lucencies of the upper lobes bilaterally, and coarsened bronchovascular markings are apparent. Extensive interstitial opacities are demonstrated throughout the lungs bilaterally. Fibrotic changes and architectural distortion are redemonstrated. No airspace consolidation, pleural effusion, or  pneumothorax is evident.  BONES: Mild scoliosis and multilevel degenerative changes of the thoracic spine are apparent. There are degenerative changes of shoulders bilaterally. OTHER: Leads overlie the chest and obscure underlying detail.           CONCLUSION:  1. Right-sided chest port in place.  2. Cardiomegaly with extensive pulmonary fibrosis.   elm-remote.   Dictated by (CST): Sharath Gruber MD on 1/20/2025 at 6:29 AM     Finalized by (CST): Sharath Gruber MD on 1/20/2025 at 6:32 AM          CT STROKE BRAIN (NO IV)(CPT=70450)    Result Date: 1/19/2025  PROCEDURE: CT STROKE BRAIN (NO IV) (CPT=70450)  COMPARISON: [facility], MRI BRAIN WO ACUTE (3) SEQUENCE(CPT=70551), 1/17/2025, 9:33 PM.  [facility], CT STROKE CTA BRAIN/CTA NECK (WIV) (CPT=70496/96877), 1/17/2025, 7:00 PM.  [facility], CT BRAIN OR HEAD (CPT=70450), 3/03/2024, 12:06 PM.  INDICATIONS: stroke alert, left leg weakness.  TECHNIQUE:   CT images were obtained without contrast material.  Automated exposure control for dose reduction was used.  Dose information is transmitted to the ACR (American College of Radiology) NRDR (National Radiology Data Registry) which includes the Dose Index Registry.  FINDINGS:   Redemonstrated postoperative changes from prior right greater than left frontal craniotomy for aneurysm clipping along the course of the anterior cerebral arteries.  The susceptibility artifact from the aneurysm clips limits evaluation of the adjacent structures.  There is unchanged mild global parenchymal volume loss with prominence of the extra-axial spaces and ventricular system.  No hydrocephalus.  There is no midline shift or mass effect.  The basal cisterns are intact.  There is atherosclerotic calcification  of the carotid siphons.  There is a small area of hypoattenuation involving the left cerebellar hemisphere (2:13), which likely corresponds to the acute infarct demonstrated on the prior  MRI brain dated 01/17/2025).  No additional transcortical area of hypoattenuation is identified.  There are scattered foci and patchy areas of hypoattenuation involving the periventricular subcortical white matter, which likely reflects sequela of mild chronic microvascular ischemic changes.  No acute intracranial hemorrhage or extra-axial fluid collection.  No displaced calvarial fracture.  Slight leftward deviation of the nasal septum.  Minimal ethmoid sinus mucosal thickening with a few trace mucosal retention cyst.  Minimal right sphenoid sinus mucosal thickening.  Trace right mastoid fluid.  Minimal sclerosis of the right greater than left mastoid tips.          CONCLUSION:  1. Small focus of hypoattenuation involving the left cerebellar hemisphere, which corresponds to the acute infarct demonstrated on the prior MRI brain dated 01/17/2025. 2. Otherwise, no transcortical area of hypoattenuation to suggest an acute infarct.  If there is clinical concern for an acute infarct, consider further evaluation with an MRI of the brain. 3. No acute intracranial hemorrhage, midline shift, mass effect, or hydrocephalus. 4. Redemonstrated postoperative changes from prior frontal craniotomy for aneurysm clipping along the course of the anterior cerebral arteries.  The associated metallic artifact from the aneurysm clips limits evaluation of the adjacent structures. 5. Lesser incidental findings described above.    Impression points 1-3 were communicated via telephone to Leonidas SOW at 9:39 a.m. on 01/19/2025 by Joe Deleon MD  Dictated by (CST): Joe Deleon MD on 1/19/2025 at 9:34 AM     Finalized by (CST): Joe Deleon MD on 1/19/2025 at 9:41 AM          MRI BRAIN WO ACUTE (3) SEQUENCE (CPT=70551)    Result Date: 1/18/2025  PROCEDURE: MRI BRAIN WO ACUTE (3) SEQUENCE(CPT=70551)  COMPARISON: [facility], CT STROKE BRAIN (NO IV) (CPT=70450), 1/17/2025, 7:00 PM.  [facility], CT BRAIN  OR HEAD (CPT=70450), 3/03/2024, 12:06 PM.  [facility], CT STROKE CTA BRAIN/CTA NECK (WIV) (CPT=70496/85863), 1/17/2025, 7:00 PM.  INDICATIONS: Dizziness.  History of a right frontal craniotomy and anterior parafalcine aneurysm clipping.  TECHNIQUE: A limited ER 3-sequence stroke protocol study was conducted with diffusion weighted imaging, T2-weighted FLAIR, and gradient recalled echo images performed in the axial plane.  Images were performed without contrast.   FINDINGS:  CSF SPACES: There is gradient susceptibility along the anterior aspect of the falx cerebri that limits assessment of the surrounding structures and is consistent with the known underlying parafalcine aneurysm clip.  The ventricles, cisterns, and sulci are commensurate in caliber and appropriate for age. No hydrocephalus, subarachnoid hemorrhage, or effacement of the basal cisterns is appreciated. There is no extra-axial fluid collection. CEREBRUM: Occasional T2/FLAIR hyperintense foci are noted in the periventricular subcortical white matter of both cerebral hemispheres.  Otherwise no focal signal abnormality of the gray or white matter is perceived. No intraparenchymal hemorrhage, edema, or cortical sulcal effacement is apparent. There is no space-occupying lesion, mass effect, or shift of midline structures. There is no diffusion restriction to suggest acute or subacute ischemia. CEREBELLUM: There are 2 foci of restricted diffusion in the left cerebellar hemisphere measuring up to 6 mm with associated T2/FLAIR hyperintensity. BRAINSTEM: The craniocervical junction is uncompromised.  CALVARIUM: Postoperative changes are again noted from a right frontal craniotomy.  There is otherwise no apparent fracture, mass, or other significant visible lesion.  SINUSES: Limited views demonstrate no significant mucosal thickening or fluid.  ORBITS: Changes of prior cataract surgery are noted bilaterally.  OTHER: The flow voids of the major  intracranial vessels are visualized.         CONCLUSION:   Limited 3-sequence stroke protocol study was performed. Within these parameters:  1. Acute to early subacute lacunar infarctions in the left cerebellar hemisphere. 2. Stable postoperative changes from a remote frontal craniotomy and anterior parafalcine aneurysm clipping. 3. Stable mild chronic microangiopathic ischemic changes.   Results of this examination were discussed with the patient's nurse, Deepak, by the on-call teleradiologist Dr. Smiley Trejo at 22:32 on 01/17/2025.  A preliminary report was issued by the [facility] teleradiology service. There are no major discrepancies.   Dictated by (CST): Abraham Mar MD on 1/18/2025 at 10:11 AM     Finalized by (CST): Abraham Mar MD on 1/18/2025 at 10:16 AM          US VENOUS DOPPLER LEG BILAT - DIAG IMG (CPT=93970)    Result Date: 1/18/2025  PROCEDURE: US VENOUS DOPPLER LEG BILAT-DIAG IMG (CPT=93970)  COMPARISON: [facility], CTA CHEST+CTA ABDOMEN DISSECT SET (CPT=71275/88144), 1/17/2025, 9:23 PM.  [facility], US VENOUS DOPPLER LEG BILAT-DIAG IMG (CPT=93970), 2/23/2024, 7:44 PM.  INDICATIONS: Recently diagnosed pulmonary emboli.  Assess for underlying DVT.  TECHNIQUE: Color duplex Doppler venous ultrasound of both lower extremities was performed in the  usual manner.  FINDINGS:   The femoral and popliteal veins appear normal.  Normal flow was demonstrated with color and pulsed Doppler.  Visualized portions of the great and small saphenous, posterior tibial and peroneal veins appear normal.    THROMBI: None visible. COMPRESSIBILITY:   Normal. OTHER: Negative.         CONCLUSION:  No DVT in either lower extremity.   A preliminary report was issued by the [facility] teleradiology service. There are no major discrepancies.  Dictated by (CST): Abraham Mar MD on 1/18/2025 at 9:32 AM     Finalized by (CST): Abraham Mar MD on 1/18/2025 at 9:33 AM           CARD ECHO 2D DOPPLER CONTRAST (CPT=93306)    Result Date: 2025  Transthoracic Echocardiogram Name:Chester Bautista Date: 2025 :  1940 Ht:  (61in)  BP: 178 / 84 MRN:  [id number]    Age:  84years    Wt:  (137lb) HR: Loc:  [facility]       Gndr: F          BSA: 1.61m^2 Sonographer: Nina MELENDREZ Ordering:    Lorena Walton Consulting:  Kai Flanagan ---------------------------------------------------------------------------- History/Indications:   Cerebrovascular accident.  Pulmonary emboli. ---------------------------------------------------------------------------- Procedure information:  A transthoracic complete 2D study was performed. Additional evaluation included M-mode, complete spectral Doppler, and color Doppler.  Patient status:  Inpatient.  Location:  Bedside.    Comparison was made to the study of 2024.    This was a STAT study. Transthoracic echocardiography for diagnosis and ventricular function evaluation. Image quality was adequate. Intravenous contrast (Definity) was administered to opacify the LV, LA, and RA. ECG rhythm:   Normal sinus ---------------------------------------------------------------------------- Conclusions: 1. Left ventricle: The cavity size was mildly reduced. Wall thickness was    normal. Systolic function was normal. The estimated ejection fraction was    55%, by visual assessment. No diagnostic evidence for regional wall    motion abnormalities. Features are consistent with a pseudonormal left    ventricular filling pattern, with concomitant abnormal relaxation and    increased filling pressure - grade 2 diastolic dysfunction. 2. Left ventricle: There is mild hypokinesis of the mid anterolateral wall. 3. Right ventricle: The cavity size was normal. Systolic function was    normal. 4. Ventricular septum: Thickness was moderately increased. 5. Left atrium: The atrium was markedly dilated. 6. Aortic valve: There was mild to moderate regurgitation. 7.  Mitral valve: There was mild regurgitation. 8. Pulmonic valve: There was moderate regurgitation. 9. Pulmonary arteries: Systolic pressure was mildly increased. Impressions:  No significant change since prior study. * ---------------------------------------------------------------------------- * Findings: Left ventricle:  The cavity size was mildly reduced. Wall thickness was normal. Systolic function was normal. The estimated ejection fraction was 55%, by visual assessment. No diagnostic evidence for diffuse regional wall motion abnormalities. No diagnostic evidence for regional wall motion abnormalities. Regional wall motion:   There is mild hypokinesis of the mid anterolateral wall.   Features are consistent with a pseudonormal left ventricular filling pattern, with concomitant abnormal relaxation and increased filling pressure - grade 2 diastolic dysfunction. Ventricular septum:   Thickness was moderately increased. Left atrium:  The atrium was markedly dilated. Right ventricle:  The cavity size was normal. Systolic function was normal. Right atrium:  The atrium was normal in size. Mitral valve:  The valve was structurally normal. Leaflet separation was normal.  Doppler:  Transvalvular velocity was within the normal range. There was no evidence for stenosis. There was mild regurgitation. Aortic valve:  The valve was structurally normal. The valve was trileaflet. The leaflets were normal thickness. Cusp separation was normal.  Doppler: Transvalvular velocity was within the normal range. There was no evidence for stenosis. There was mild to moderate regurgitation. Tricuspid valve:  The valve is structurally normal. Leaflet separation was normal.  Doppler:  Transvalvular velocity was within the normal range. There was no evidence for stenosis. There was mild regurgitation. Pulmonic valve:   The valve is structurally normal. Cusp separation was normal.  Doppler:  Transvalvular velocity was within the normal range.  There was no evidence for stenosis. There was moderate regurgitation. The peak systolic gradient was 17mm Hg. Pericardium:   There was no pericardial effusion. Aorta: Aortic root: The aortic root was normal-sized. Ascending aorta: The ascending aorta was normal. Pulmonary arteries: The main pulmonary artery was normal-sized. Systolic pressure was mildly increased. Systemic veins: Inferior vena cava: The IVC was normally collapsible and normal-sized. ---------------------------------------------------------------------------- Measurements  Left ventricle         Value        Ref       02/03/2024  IVS thickness, ED, (H) 1.3   cm     0.6 - 0.9 1.1  PLAX  LV ID, ED, PLAX    (L) 3.6   cm     3.8 - 5.2 3.7  LV ID, ES, PLAX        2.7   cm     2.2 - 3.5 2.7  LV PW thickness,       0.9   cm     0.6 - 0.9 0.9  ED, PLAX  IVS/LV PW ratio,       1.44         --------- 1.22  ED, PLAX  LV PW/LV ID ratio,     0.25         --------- 0.24  ED, PLAX  LV ejection        (L) 50    %      54 - 74   54  fraction  Stroke volume/bsa,     27    ml/m^2 --------- 34  2D  LV end-diastolic       64    ml     48 - 140  ----------  volume, 1-p A4C  LV ejection            52    %      46 - 78   ----------  fraction, 1-p A4C  Stroke volume, 1-p     33    ml     --------- ----------  A4C  LV end-diastolic       40    ml/m^2 30 - 82   ----------  volume/bsa, 1-p  A4C  Stroke volume/bsa,     21    ml/m^2 --------- ----------  1-p A4C  LV end-diastolic       63    ml     46 - 106  ----------  volume, 2-p  LV end-systolic        34    ml     14 - 42   ----------  volume, 2-p  LV ejection        (L) 46    %      54 - 74   ----------  fraction, 2-p  Stroke volume, 2-p     29    ml     --------- ----------  LV end-diastolic       39    ml/m^2 29 - 61   ----------  volume/bsa, 2-p  LV end-systolic        21    ml/m^2 8 - 24    ----------  volume/bsa, 2-p  Stroke volume/bsa,     18    ml/m^2 --------- ----------  2-p  LV e', lateral     (L) 5.9   cm/sec  >=10.0    6.5  LV E/e', lateral   (H) 14           <=13      19  LV e', medial      (L) 2.8   cm/sec >=7.0     6.4  LV E/e', medial        29           --------- 20  LV e', average         4.4   cm/sec --------- 6.5  LV E/e', average   (H) 19           <=14      19  LVOT                   Value        Ref       02/03/2024  LVOT ID                1.8   cm     --------- 1.9  LVOT peak              0.84  m/sec  --------- 1  velocity, S  LVOT VTI, S            19.5  cm     --------- 19.2  LVOT peak              3     mm Hg  --------- 4  gradient, S  LVOT mean              2     mm Hg  --------- 2  gradient, S  Stroke volume          50    ml     --------- 54  (SV), LVOT DP  Stroke index           31    ml/m^2 --------- 34  (SV/bsa), LVOT DP  Aortic valve           Value        Ref       02/03/2024  Aortic pressure        360   ms     --------- ----------  half-time  Aortic regurg          4.66  m/sec  --------- ----------  velocity, ED  Aortic regurg          496   cm/s^2 --------- ----------  deceleration  Aortic regurg          275   ms     --------- ----------  pressure half-time  Aortic regurg          87    mm Hg  --------- ----------  gradient, ED  Aortic root            Value        Ref       02/03/2024  Aortic root ID         3.0   cm     2.4 - 3.8 3.4  Aortic root ID,        3.2   cm     2.0 - 3.2 ----------  STJ, ED  Aortic root ID,        2.9   cm     --------- ----------  ED, MM  Ascending aorta        Value        Ref       02/03/2024  Ascending aorta ID (H) 3.6   cm     1.9 - 3.5 3.3  Left atrium            Value        Ref       02/03/2024  LA ID, A-P, ES     (H) 4.8   cm     2.7 - 3.8 ----------  LA volume, S       (H) 84    ml     22 - 52   52  LA volume/bsa, S   (H) 52    ml/m^2 16 - 34   32  LA volume, ES, 1-p (H) 86    ml     22 - 52   53  A4C  LA volume, ES, 1-p (H) 80    ml     22 - 52   49  A2C  LA volume, ES, A/L     88    ml     --------- 55  LA volume/bsa, ES, (H) 55    ml/m^2 16 - 34   34  A/L   LA/aortic root         1.6          --------- ----------  ratio  Mitral valve           Value        Ref       02/03/2024  Mitral E-wave peak     0.83  m/sec  --------- 1.26  velocity  Mitral A-wave peak     1.54  m/sec  --------- 1.43  velocity  Mitral                 225   ms     --------- 236  deceleration time  Mitral peak            3     mm Hg  --------- 6  gradient, D  Mitral E/A ratio,      0.5          --------- 0.9  peak  Mitral regurg peak     488   cm/sec --------- ----------  velocity  Mitral peak LV-LA      95.26 mm Hg  --------- ----------  gradient, S  Mitral maximal         486   cm/sec --------- ----------  regurg velocity,  PISA  Pulmonary artery       Value        Ref       02/03/2024  PA pressure, S, DP     25    mm Hg  --------- ----------  PA pressure, ED,       14    mm Hg  --------- ----------  DP  Tricuspid valve        Value        Ref       02/03/2024  Tricuspid regurg   (H) 3.14  m/sec  <=2.8     2.71  peak velocity  Tricuspid peak         39    mm Hg  --------- 29  RV-RA gradient  Systemic veins         Value        Ref       02/03/2024  Estimated CVP          3     mm Hg  --------- 3  Inferior vena cava     Value        Ref       02/03/2024  ID                     1.6   cm     <=2.1     1.4  Right ventricle        Value        Ref       02/03/2024  TAPSE, MM          (L) 1.07  cm     >=1.70    1.41  RV pressure, S, DP     42    mm Hg  --------- 32  RV s', lateral         11.1  cm/sec >=9.5     16.6  Pulmonic valve         Value        Ref       02/03/2024  Pulmonic valve         2.1   m/sec  --------- ----------  peak velocity, S  Pulmonic peak          17    mm Hg  --------- ----------  gradient, S  Pulmonic regurg        2.11  m/sec  --------- ----------  peak velocity  Pulmonic regurg        18    mm Hg  --------- ----------  peak gradient  Pulmonic regurg        1.68  m/sec  --------- ----------  velocity, ED  Pulmonic regurg        11    mm Hg  --------- ----------  gradient, ED  Legend: (L)  and  (H)  lex values outside specified reference range. ---------------------------------------------------------------------------- Prepared and electronically signed by Walker Montero MD 01/18/2025 09:24     CTA CHEST+CTA ABDOMEN DISSECT SET (CPT=71275/67498)    Result Date: 1/17/2025  PROCEDURE: CT CHEST ABDOMEN DISSECT SET (CPT=71275/98753)  COMPARISON: [facility], XR CHEST AP PORTABLE (CPT=71045), 1/17/2025, 7:14 PM.  CT CHEST(CONTRAST ONLY) (CPT=71260), 3/09/2023, 1:42 PM.  [facility], CT STROKE CTA BRAIN/CTA NECK (WIV) (CPT=70496/34890), 1/17/2025, 7:00 PM.   [facility], CT CHEST PE AORTA (IV ONLY) (CPT=71260), 10/17/2024, 5:39 PM.  [facility], CT CHEST(CONTRAST ONLY) (CPT=71260), 4/26/2024, 11:31 AM.  [facility], CT CHEST PE AORTA (IV ONLY) (CPT=71260), 2/23/2024, 4:45 PM.  [facility],  PELVIS W EV (CPT=76856/43819), 1/26/2023, 10:55 AM.  INDICATIONS: Focal psychomotor deficit. Abnormal CT with left subclavian arterial thrombus.  TECHNIQUE:  Multidetector CT images of the chest and abdomen were obtained with non-ionic intravenous contrast material. Multiplanar reformats and maximum intensity projection images were created. Automated exposure control for dose reduction was used. Adjustment of the mA and/or kV was done based on the patient's size. Iterative reconstruction technique for dose reduction was employed. Dose information was transmitted to the ACR (American College of Radiology) NRDR (National Radiology Data Registry), which includes the Dose Index Registry.     FINDINGS: CARDIAC: The heart is borderline enlarged. There is no bowing of the interventricular septum to suggest right ventricular strain. Aortic annular calcifications are observed. Atherosclerotic vascular calcifications are present in the coronary vessels. Trace pericardial effusion extends into the superior  pericardial recess. THORACIC AORTA: Unremarkable configuration without evidence of dissection flap. The ascending thoracic aorta is borderline ectatic, measuring 3.5 cm. Scattered atherosclerotic vascular calcifications are appreciated. Extensive irregular mural thrombus of  the aorta is apparent. Thrombus extends into the proximal left subclavian artery. VASCULATURE: There is adequate opacification of the pulmonary arterial tree. Suspicious filling defects are identified in the bilateral lower lobe subsegmental pulmonary artery branches, suggesting acute pulmonary emboli. The main pulmonary artery trunk is normal in caliber, measuring 3.2 cm. LUNGS/PLEURA: Background parenchymal findings of moderate to severe upper lobe predominant centrilobular and paraseptal emphysema are noted. Minimal peripheral subpleural interstitial reticular opacities are seen in the anterior right upper lobe, tangential to the chest wall, in a pattern suggestive of postradiation fibrosis along a radiation port. A chronic nodular opacity in the right middle lobe measures 2.6 x 1.8 cm (series 4, image 60).   Areas of architectural distortion are present, consistent with multifocal pleuroparenchymal scarring. There is dependent subsegmental atelectasis bilaterally.  Additional scattered ground-glass and reticular opacities are present and may be atelectatic in origin. No airspace consolidation, pleural effusion, or pneumothorax is detected.  AIRWAYS: Mild bronchial wall thickening is appreciated. The tracheobronchial tree is without central mass or obstructing lesion. MEDIASTINUM/DYLON: No mass or lymphadenopathy.  CHEST WALL: Coarse right breast calcifications and surgical clips are suggested. No axillary mass or lymphadenopathy.   COMMENT: Imaging findings of the abdomen are interpreted in the arterial phase of contrast-enhancement. LIVER: No enlargement, atrophy, abnormal density, or significant focal lesion is identified.  BILIARY: The  gallbladder is present. PANCREAS: No lesion, fluid collection, ductal dilatation, or atrophy.  SPLEEN: No enlargement.  ADRENALS:   No defined mass or abnormal enlargement.  KIDNEYS:   Symmetric enhancement and excretion of contrast is seen without evidence of hydronephrosis or underlying solid masses. 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 Multiple well circumscribed renal hypodensities are present and are not completely characterized, but statistically likely represent cysts. GI/MESENTERY:  A small hiatal hernia is evident. Apparent gastric wall thickening is likely secondary to underdistention. There is no evidence of bowel obstruction. A normal caliber appendix is seen without inflammatory manifestations. Scattered colonic diverticula are present in the proximal and distal colon. There is no colonic wall thickening or pericolonic fat stranding in the imaged volume.  VASCULATURE:   Heavy atherosclerotic vascular calcifications of the abdominal aorta are observed. No aneurysm is detected. Areas of ulcerated mural thrombus/atheromatous plaque are scattered throughout the abdominal aorta. RETROPERITONEUM: No mass or lymphadenopathy is apparent.  BONES:   There is slight accentuation of thoracic kyphosis. Right convex curvature of the midthoracic spine is noted with left convexity of the cervicothoracic junction. There is left convexity of the thoracolumbar junction with dextroscoliosis of the lumbar spine. Multilevel degenerative changes are seen throughout the spine. There is associated vacuum disc phenomenon. Significant degenerative changes of the shoulders are present bilaterally. Sclerosis and vacuum phenomena of the sacroiliac joints are observed. ABDOMINAL WALL: There is a minute fat-containing umbilical hernia superimposed on periumbilical diastasis. Minimal dependent  subcutaneous edema is appreciated. OTHER: No free air or fluid is seen in the abdomen.           CONCLUSION:  1. Acute pulmonary emboli involving the bilateral lower lobe subsegmental pulmonary artery branches.  2. No evidence of aortic dissection.  3. Ascending thoracic aortic ectasia. Multifocal irregular partially ulcerated peripheral mural thrombus is observed throughout the thoracic and abdominal aorta.   4. Dilatation of the main pulmonary artery trunk may relate to underlying pulmonary hypertension.   5. Moderate to severe centrilobular/paraseptal emphysema with extensive postradiation fibrosis and chronic opacity of the right middle lobe.  6. Multifocal pleuroparenchymal scarring is apparent.  7. Proximal distal colonic diverticulosis without CT evidence of acute complication in the imaged volume.  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  9. Lesser incidental findings as above.    Results of this examination were discussed with the patient's physician, Dr. Cottrell, by Dr. Gruber at 2213 on 01/17/2025.   Dictated by (CST): Sharath Gruber MD on 1/17/2025 at 10:10 PM     Finalized by (CST): Sharath Gruber MD on 1/17/2025 at 10:22 PM          XR CHEST AP PORTABLE  (CPT=71045)    Result Date: 1/17/2025  PROCEDURE: XR CHEST AP PORTABLE  (CPT=71045) TIME: 1920.   COMPARISON: [facility], CT CHEST PE AORTA (IV ONLY) (CPT=71260), 10/17/2024, 5:39 PM.  [facility], XR CHEST PA + LAT CHEST (TAY=96656), 1/03/2025, 4:24 PM.  [facility], XR CHEST AP PORTABLE (CPT=71045), 10/22/2024, 1:18 PM.  [facility], XR CHEST AP PORTABLE (CPT=71045), 10/17/2024, 2:36 PM.  [facility], XR CHEST AP PORTABLE (CPT=71045), 2/02/2024, 2:37 PM.  INDICATIONS: Focal psychomotor deficit. Dizziness and right arm tingling today. History of COPD.  TECHNIQUE:   Single view.   FINDINGS:  POSITION: The patient is slightly rotated to the left.  DEVICES: There is a right-sided Port-A-Cath with tip projecting near the cavoatrial junction. CARDIAC/VASC: The cardiomediastinal silhouette is accentuated by AP technique, but likely stably enlarged. There is mild tortuosity of the thoracic aorta with peripheral atherosclerotic vascular calcification. The pulmonary vascularity is within normal limits. MEDIAST/DYLON: No visible mass or adenopathy. LUNGS/PLEURA: The lungs are hyperexpanded. There are relative lucencies of the upper lobes bilaterally, and coarsened bronchovascular markings are apparent. Extensive interstitial opacities are demonstrated throughout the lungs bilaterally. Fibrotic changes and architectural distortion are re-identified. No airspace consolidation, pleural effusion, or pneumothorax is evident.  BONES: Mild scoliosis and multilevel degenerative changes of the thoracic spine are apparent. There are degenerative changes of shoulders bilaterally; there is superior migration of the right humeral head with narrowing of the right acromiohumeral interval, suggesting chronic full-thickness rotator cuff tear. OTHER: Leads overlie the chest and obscure underlying detail.           CONCLUSION:  1. Unchanged chronic fibrotic changes throughout the lungs.  2. Cardiomegaly.    Dictated by (CST): Sharath Gruber MD on 1/17/2025 at 7:40 PM     Finalized by (CST): Sharath Gruber MD on 1/17/2025 at 7:42 PM          CT STROKE CTA BRAIN/CTA NECK (W IV)(CPT=70496/47149)    Result Date: 1/17/2025  PROCEDURE: CT STROKE CTA BRAIN/CTA NECK (WIV) (CPT=70496/99370)  COMPARISON: [facility], CT CHEST(CONTRAST ONLY) (CPT=71260), 4/26/2024, 11:31 AM.  [facility], CT CHEST PE AORTA (IV ONLY) (CPT=71260), 10/17/2024, 5:39 PM.  [facility], CT STROKE BRAIN (NO IV) (CPT=70450),  1/17/2025, 7:00 PM.  [facility], CT BRAIN OR HEAD (CPT=70450), 3/03/2024, 12:06 PM.  [facility], CTA BRAIN (CPT=70496),  12/09/2021, 3:11 PM.  [facility], CT BRAIN OR HEAD (CPT=70450), 12/03/2021, 2:50 PM.  INDICATIONS: Focal psychomotor deficit. Dizziness with right arm numbness and tingling. Stroke alert.  TECHNIQUE: CT images of the neck and brain were obtained with non-ionic intravenous contrast material. Multi-planar reformatted and 3-D images were created with vessel analysis performed on an independent workstation. Automated exposure control for dose reduction was used. Evaluation of internal carotid stenosis is based on NASCET Criteria.    FINDINGS: CT ANGIOGRAPHY OF THE NECK:  CAROTID ARTERIES: RIGHT COMMON CAROTID: A medialized retropharyngeal course is demonstrated. No hemodynamically significant stenosis or dissection. RIGHT INTERNAL CAROTID: Tortuosity is present proximally. No hemodynamically significant stenosis or dissection.  LEFT COMMON CAROTID: No hemodynamically significant stenosis or dissection. LEFT INTERNAL CAROTID: Significant tortuosity of the proximal left internal carotid artery is noted. No hemodynamically significant stenosis or dissection.  VERTEBRAL ARTERIES: RIGHT VERTEBRAL ARTERY: No hemodynamically significant stenosis or dissection. LEFT VERTEBRAL ARTERY: No hemodynamically significant stenosis or dissection.  CT ANGIOGRAPHY OF THE BRAIN:  ANTERIOR CIRCULATION: DISTAL INTERNAL CAROTIDS: Atherosclerotic vascular calcifications are perceived in the cavernous carotid arteries. Flow identified. No significant stenosis. ANTERIOR CEREBRALS: Flow identified. No significant stenosis. MIDDLE CEREBRALS: Flow identified. No significant stenosis.  POSTERIOR CIRCULATION: RIGHT VERTEBRAL: Flow identified. No significant stenosis. LEFT VERTEBRAL: Flow identified. No significant stenosis. BASILAR: Flow identified. No significant stenosis. POSTERIOR CEREBRALS: Flow identified. No significant stenosis.  ANEURYSMS: None detected; postprocedural changes of the anterior parafalcine aneurysm clipping are  identified. VASCULAR MALFORMATIONS: None. OTHER: Limited views are notable for postoperative changes of the lenses bilaterally. Postoperative changes of frontal craniotomy are partially delineated. A right-sided chest port is partially visualized.     AORTIC ARCH (Limited): No aneurysm or dissection is identified in the imaged volume. Extensive irregular mural thrombus is demonstrated. Significant thrombus extends into the proximal left subclavian artery origin.  MEDIASTINUM/APICES (Limited): Trace pericardial fluid is seen in the superior pericardial recess.The main pulmonary trunk is dilated in caliber, measuring 3.4 cm. Background parenchymal findings of moderate upper lobe predominant centrilobular/paraseptal  emphysema are noted. Extensive areas of subpleural reticulation are present, and there are subpleural cystic changes with probable areas of potential fibrosis and scarring. Mucus/debris is present in the proximal tracheal lumen.          CONCLUSION:  1. Negative for hemodynamically significant stenosis or occlusion of the anterior or posterior circulations.   2. The internal carotid arteries demonstrate tortuosity proximally without evidence of hemodynamically stenosis or occlusion.  3. The vertebral arteries demonstrate contiguous patency bilaterally without evidence of flow-limiting stenosis.  4. 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 Thrombus extends into the proximal left subclavian artery origin, contributing to luminal narrowing.  5. Dilatation of the main pulmonary artery trunk may relate to underlying pulmonary hypertension.  6. 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   7. Lesser incidental findings as above.   Dictated by (CST): Sharath Gruber MD on 1/17/2025 at 7:27 PM     Finalized by (CST): Sharath Gruber MD on 1/17/2025 at 7:36 PM          CT STROKE BRAIN (NO IV)(CPT=70450)    Result Date: 1/17/2025  PROCEDURE: CT STROKE BRAIN (NO IV) (CPT=70450)   COMPARISON: [facility], CT BRAIN OR HEAD (CPT=70450), 3/03/2024, 12:06 PM.  [facility], CTA BRAIN (CPT=70496), 12/09/2021, 3:11 PM.  [facility], CT BRAIN OR HEAD (CPT=70450), 12/03/2021, 2:50 PM.  INDICATIONS: Focal psychomotor deficit. Dizziness with paresthesias and right arm numbness and tingling. Stroke alert.  TECHNIQUE: CT images were obtained without contrast material. Automated exposure control for dose reduction was used. Dose information was transmitted to the ACR (American College of Radiology) NRDR (National Radiology Data Registry), which includes the Dose Index Registry.   FINDINGS:  CSF SPACES: In the anterior parafalcine region, an aneurysm clip is evident. The ventricles, cisterns, and sulci are dilated, but remain commensurate in caliber, consistent with parenchymal volume loss of a degree that is appropriate for age. No hydrocephalus, subarachnoid hemorrhage, or effacement of the basal cisterns is appreciated. There is no extra-axial fluid collection. CEREBRUM: There is metallic streak artifact from aneurysm clipping resulting in obscuration of adjacent structures. No acute intraparenchymal hemorrhage, edema, or cortical sulcal effacement is apparent. There is no space-occupying lesion, mass effect, or shift of midline structures. The gray-white matter junction is preserved and bilaterally symmetric in appearance.  Scattered periventricular and subcortical white matter hypodensities are present, consistent with chronic microvascular ischemic disease. CEREBELLUM: No edema, hemorrhage, mass, or acute infarction is seen. There is cerebellar folial expansion consistent with atrophy.  BRAINSTEM: Patchy areas of low attenuation likely reflect sequela of chronic small vessel ischemic disease. No edema, hemorrhage, mass, or acute infarction is seen. There is commensurate atrophy.  CALVARIUM: Postoperative changes of frontal craniotomy are evident.  Morphologic changes of hyperostosis frontalis interna are suggested. SINUSES: Limited views demonstrate no significant mucosal thickening or fluid.  ORBITS: Limited views are notable for postoperative changes of the lenses bilaterally.  OTHER: Anterior parafalcine aneurysm clipping is perceived. Atherosclerotic vascular calcifications are perceived in the cavernous carotid and distal vertebral arteries. The patient appears largely edentulous on the  view          CONCLUSION:  1. No acute intracranial hemorrhage or evidence of extended large vessel infarction. If there is clinical concern for acute ischemia, follow-up MRI suggested.   2. Postoperative changes of frontal craniotomy with anterior parafalcine aneurysm clipping.  3. Senescent changes of parenchymal volume loss with sequela of chronic microvascular ischemic disease.  4. There is large vessel atherosclerosis of the anterior and posterior intracranial circulations.  5. Lesser incidental findings as above.    This report was called immediately at 1915 hours to Emergency Department Pod 3 and discussed with the patient's ER physician, Dr. Cottrell.    Dictated by (CST): Sharath Gruber MD on 1/17/2025 at 7:11 PM     Finalized by (CST): Sharath Gruber MD on 1/17/2025 at 7:15 PM          XR CHEST PA + LAT CHEST (CPT=71046)    Result Date: 1/7/2025  PROCEDURE: XR CHEST PA + LAT CHEST (CPT=71046)  COMPARISON: [facility], CT CHEST PE AORTA (IV ONLY) (CPT=71260), 10/17/2024, 5:39 PM.  [facility], XR CHEST AP PORTABLE (CPT=71045), 10/22/2024, 1:18 PM.  [facility], XR CHEST AP PORTABLE (CPT=71045), 10/17/2024, 2:36 PM.  [facility], XR CHEST PA + LAT CHEST (RAC=60137), 3/06/2024, 3:59 PM.  INDICATIONS: Dyspnea and fatigue x2 weeks. History of COPD, smoking and diabetes.  TECHNIQUE:   Two views.   FINDINGS: CARDIOMEDIASTINAL SILHOUETTE: The cardiac silhouette is enlarged and unchanged. There is  atherosclerotic calcification of the aortic arch and thoracic aorta. There is a right sided port catheter with tip at the distal SVC.  LUNGS: Extensive interstitial prominence is seen in the bilateral upper and lower lobes consistent with interstitial lung disease and COPD. No gross mass or air space opacity is seen given limitations from the adjacent parenchymal abnormalities. No pleural effusion. There is biapical scarring and pleural thickening. There is mild bibasilar atelectasis.  BONES: There are degenerative changes within the thoracic spine.         CONCLUSION:    Extensive fibrotic changes within the bilateral lungs is unchanged since the prior exams..  Dictated by (CST): Mich Jade MD on 1/07/2025 at 9:17 AM     Finalized by (CST): Mich Jade MD on 1/07/2025 at 9:18 AM          [unfilled]    Assessment    Patient Active Problem List   Diagnosis    Adult general medical examination    Vaccine counseling    Colon cancer screening    Type 2 diabetes mellitus without retinopathy (HCC)    Sarcoidosis    Anemia, iron deficiency    Dyslipidemia    Hypercalcemia    Hypertension    Infiltrating ductal carcinoma of right breast, stage 1 (HCC)    Rheumatoid arthritis (HCC)    Osteoarthrosis    Peripheral vascular disease due to secondary diabetes mellitus (HCC)    Neuropathy    AVM (arteriovenous malformation) of duodenum, acquired    Pseudophakia of both eyes    Vitreous floaters of both eyes    Glaucoma suspect of both eyes    Iron deficiency anemia due to chronic blood loss    Malabsorption of iron (HCC)    Port-A-Cath in place    Chronic obstructive pulmonary disease, unspecified (HCC)    Adnexal mass    Encounter for care related to vascular access port    High risk medication use    Meibomian gland dysfunction (MGD) of both eyes    Hypokalemia    Hyperkalemia    Hypomagnesemia    Constipation    Gastric AVM    AVM (arteriovenous malformation) of small bowel, acquired with hemorrhage    Leukocytosis     Presence of IVC filter    CKD (chronic kidney disease) stage 3, GFR 30-59 ml/min (HCC)    Dry eye syndrome of both eyes    Vitamin D deficiency    Cystic thyroid nodule    Cough    Chronic fatigue    Cerebellar stroke (HCC)    Dizziness    Right hand paresthesia    Thrombus of aorta (HCC)    Bilateral pulmonary embolism (HCC)    Acute cystitis without hematuria    PE (pulmonary thromboembolism) (HCC)    Aortic thrombus (HCC)    New cerebellar infarct (HCC)    Small bowel bleed not requiring more than 4 units of blood in 24 hours, ICU, or surgery    Acute blood loss anemia    Cerebrovascular accident (CVA) due to embolism of precerebral artery (HCC)    Melena       Plan     Continue PT OT SLP  H&H is being monitored on Brilinta  Clarification of GI prophylaxis  Antiplatelet-ASA plus Brilinta  GI prophylaxis-octreotide, Protonix  HLD-Crestor  UTI-still on cefepime  BP parameters    Patient doing remarkably well.  Continues to require active medical monitoring in addition to inpatient PT OT and is appropriate candidate for acute inpatient debilitation    Thank you's consultation and allowing me to participate in this patient's care      ANNALISE GUPTA MD    1/28/2025    3:12 PM         [1]   Facility-Administered Medications Prior to Admission   Medication Dose Route Frequency Provider Last Rate Last Admin    Certolizumab Pegol  mg  400 mg Subcutaneous Q14 Days Ashia Goodrich MD   400 mg at 01/09/25 1225    octreoTIDe (Sandostatin LAR) IM injection 20 mg  20 mg Intramuscular Q30 Days Zoila Mcqueen MD         Medications Prior to Admission   Medication Sig Dispense Refill Last Dose/Taking    acetaminophen 325 MG Oral Tab Take 2 tablets (650 mg total) by mouth every 8 (eight) hours.   1/17/2025 Morning    rosuvastatin 20 MG Oral Tab Take 1 tablet (20 mg total) by mouth nightly. 90 tablet 3 Past Week    magnesium oxide 400 MG Oral Tab Take 1 tablet (400 mg total) by mouth in the morning, at noon, and at bedtime.  Per Dr. JENSEN Mata   2025 Noon    valsartan 80 MG Oral Tab 1  1/2 tabs every 12 hrs. (Patient taking differently: Take 1 tablet (80 mg total) by mouth 2 (two) times daily. 1  1/2 tabs every 12 hrs.) 270 tablet 3 2025 Evening    loratadine 10 MG Oral Tab TAKE 1 TABLET BY MOUTH EVERY DAY 30 tablet 1 2025 Morning    Potassium Chloride ER 20 MEQ Oral Tab CR Take 40 mEq by mouth every morning. 60 tablet 1 2025 Morning    hydroxychloroquine 200 MG Oral Tab Take 1.5 tablets (300 mg total) by mouth daily. 135 tablet 1 2025 Morning    predniSONE 5 MG Oral Tab Take 1 tablet (5 mg total) by mouth daily. 90 tablet 1 2025 Morning    famotidine 20 MG Oral Tab Take 1 tablet (20 mg total) by mouth 2 (two) times daily. Patient gets over the counter   2025 Morning    glipiZIDE 5 MG Oral Tab Take 1 tablet (5 mg total) by mouth every morning before breakfast. 90 tablet 0 2025    albuterol 108 (90 Base) MCG/ACT Inhalation Aero Soln TAKE 2 PUFFS BY MOUTH EVERY 4 TO 6 HOURS AS NEEDED 6.7 each 5 2025 Morning    cyclobenzaprine 10 MG Oral Tab Take 1 tablet (10 mg total) by mouth nightly as needed. 90 tablet 1 Past Week    insulin glargine (LANTUS SOLOSTAR) 100 UNIT/ML Subcutaneous Solution Pen-injector Inject 10 Units into the skin daily with dinner. (Patient taking differently: Inject 14 Units into the skin daily with dinner.) 3 mL 1 Past Week    montelukast 10 MG Oral Tab Take 1 tablet (10 mg total) by mouth nightly. 90 tablet 3 Past Week    carvedilol 12.5 MG Oral Tab Take 1 tablet (12.5 mg total) by mouth 2 (two) times daily with meals. 180 tablet 3 2025 Morning    fluticasone-salmeterol (WIXELA INHUB) 250-50 MCG/ACT Inhalation Aerosol Powder, Breath Activated Inhale 1 puff into the lungs every 12 (twelve) hours. 1 each 5 2025 Morning    gabapentin 100 MG Oral Cap Take 1 capsule (100 mg total) by mouth nightly. 90 capsule 3 Past Week    [] ciprofloxacin 500 MG Oral Tab Take 1  tablet (500 mg total) by mouth 2 (two) times daily for 7 days. 14 tablet 0     ipratropium 0.02 % Inhalation Solution Take 2.5 mL (500 mcg total) by nebulization 2 (two) times daily. 180 each 2     albuterol (2.5 MG/3ML) 0.083% Inhalation Nebu Soln Take 3 mL (2.5 mg total) by nebulization every 6 (six) hours as needed for Wheezing. 360 mL 3     [] doxycycline 100 MG Oral Cap Take 1 capsule (100 mg total) by mouth 2 (two) times daily for 7 days. (Patient not taking: Reported on 10/25/2024) 14 capsule 0     [] albuterol (2.5 MG/3ML) 0.083% Inhalation Nebu Soln Take 3 mL (2.5 mg total) by nebulization every 4 (four) hours as needed for Wheezing or Shortness of Breath. 30 each 0     BD PEN NEEDLE KADEN 2ND GEN 32G X 4 MM Does not apply Misc 1 EACH DAILY. USE DAILY WITH INSULIN 100 each 1     Blood Glucose Monitoring Suppl (BLOOD GLUCOSE SYSTEM GILLES) Does not apply Kit Dx. E11.9. Checks qam. 1 kit 0     Blood Glucose Monitoring Suppl w/Device Does not apply Kit Dx. E11.9. Checks qam. 1 kit 0     Blood Gluc Meter Disp-Strips Does not apply Device Dx. E11.9. Checks qam. 100 each 0     Accu-Chek FastClix Lancets Does not apply Misc Check sugar once daily as directed (E11.42) 100 each 2     Blood Glucose Monitoring Suppl (ONETOUCH VERIO) w/Device Does not apply Kit 1 each daily. Test 1x daily 1 kit 0     OneTouch Delica Lancets 33G Does not apply Misc 4 x daily 100 each 1     acetaminophen 500 MG Oral Tab Take 1 tablet (500 mg total) by mouth daily.      [2]   Allergies  Allergen Reactions    Celebrex [Celecoxib] PALPITATIONS    Penicillins ITCHING and SWELLING    Amlodipine OTHER (SEE COMMENTS)     Calves Swelling     Metformin And Related UNKNOWN    Olmesartan UNKNOWN

## 2025-01-28 NOTE — CM/SW NOTE
Anticipated therapy need: Intensive Therapy Program. PM&R requested. AIR referrals sent in [facility]. List of accepting facilities will be needed for choice. Ins auth will be needed prior to AIR.    2025 at 1145: Previous SW provided list of accepting AIR facilities to pt's daughter Brea on . Brea notified CM that [facility] [place] [facility]. [facility] in [facility], updates uploaded. CM requested that facility submit for ins auth.    1650: Per the CM @ [facility] the medical director intends to deny the IP acute rehab admission and would like to offer a P2P.  CM notified MD that P2P needs to be completed by 2/3/25 by 3:00 pm and that she will need call [phone], option #1. CM notified MD that pt's name, , member ID#[id number], and reference #[id number] will need to be provided.     2025 at 1840:  notified MD for 2nd time that P2P needs to be completed by 2/3/25 by 3:00 pm and that she will need call [phone], option #1. CM notified MD that pt's name, , member ID#[id number], and reference #[id number] will need to be provided.     Plan: [facility] for AIR pending P2P MD review/ins auth and medical clearance.    ARISTEO Cortez    [phone]

## 2025-01-28 NOTE — SLP NOTE
SPEECH/LANGUAGE/COGNITIVE EVALUATION - INPATIENT    Admission Date: 1/17/2025  Evaluation Date: 01/28/25    Reason for Referral: Stroke protocol    ASSESSMENT & PLAN   ASSESSMENT & IMPRESSION  PPE REQUIRED. THIS THERAPIST WORE GLOVES, DROPLET MASK, AND GOGGLES FOR DURATION OF EVALUATION. HANDS WASHED UPON ENTRANCE/EXIT.    In regards to S/L cognitive skills, pt reports difficulty with word finding and memory. Oral agility and verbal agility tasks appear within functional limits per informal measures. WAB completed and pt demonstrates aphasia. The SLUMS was administered in today's evaluation. Pt presents with impaired receptive/expressive language skills characterized by evidence of word finding across conversational tasks, impaired generative naming, impaired sequencing abilities, difficulty with problem solving tasks, and oriented xself and xplace - reoriented xyear and xday. Pt able to accurately respond to y/n questions, follow 1 step commands, follow written directions, and provide correct responses to 3/4 wh-questions following a short story. Pt exhibits impaired immediate memory and short term memory skills as evidenced by her ability to recall 2/5 items post 3 minutes. Clock drawing appears incorrect with inadequate placement of numbers and hands set to the incorrect time.     PLAN: SLP to f/u x3 to target cognitive-communication deficits. Recommend continued tx at next LOC.     Assessment(s) Administered: SLUMS;WAB Bedside Score;Perceptual Dysarthria Evaluation Rating     WAB Bedside Score: 60  Perceptual Dysarthria Evaluation Rating: informal  SLUMS 12/30       Deficits Identified: Problem solving;Reasoning;Memory;Sequencing    Patient Experiencing Pain: No      GOALS  Goal #1 The patient will complete Confrontation, Responsve naming tasks with min-mod cues with 80 % accuracy within   3 session(s).   In Progress   Goal #2 The patient will complete orientation tasks with 90% accuracy with min-mod cues within  3 sessions.    In Progress   Goal #3 The patient will problem solving tasks with 70% accuracy with mod cues within 3 sessions.  In Progress   Goal #4 The patient will sequencing tasks with 80% accuracy with min-mod cues within 3 sessions.    In Progress   Goal #5 The pt will recall 3 out of 5 pieces of functional information with 80% accuracy and min-mod cues within 3 sessions.    In Progress     Prior Living Situation:  (Home with dtr)  Prior Level of Function: Independent      Imaging Results:     MRI BRAIN 1/25/25:  CONCLUSION:   1. Interval development of multiple acute infarcts with largest in the left parietal occipital region and left posterior temporal lobe..  Other smaller infarcts in the bilateral posterior frontal lobes, right paramedian posterior parietal lobe, right   occipital lobe, left anterior frontal lobe.   2. Recent left thalamic lacunar infarct is unchanged.    3. One of recent cerebellar lacunar infarcts is much less conspicuous and others no longer restrict diffusion.  Other chronic cerebellar lacunar infarcts.   4. Post right frontal craniotomy for aneurysm clipping.            Dictated by (CST): Michael Gonzalez MD on 1/25/2025 at 6:22 PM       Finalized by (CST): Michael Gonzalez MD on 1/25/2025 at 6:31 PM       CXR 1/28/25:  CONCLUSION:   1. Cardiomegaly tortuous thoracic aorta   2. Bilateral mixed alveolar and interstitial multifocal airspace opacification with interval progression.   3. Bilateral central venous catheters with the tip in the distal SVC near the atrial junction.  No pneumothorax.  Left-sided Mediastinal vascular stent in place.             Dictated by (CST): Louis Moreno MD on 1/28/2025 at 8:25 AM       Finalized by (CST): Louis Moreno MD on 1/28/2025 at 8:29 AM       Patient/Family Goals: Pt reports difficulty with memory and word finding    Interdisciplinary Communication: Discussed with RN  Recommendations posted at bedside    Patient, family and/or caregiver  has been informed and has taken part in this evaluation and plan of treatment and have been advised and agree on the findings and goals.      FOLLOW UP  Treatment Plan/Recommendations: Cognitive communication therapy;Aspiration precautions  Duration: 1 week  Follow Up Needed (Documentation Required): Yes  SLP Follow-up Date: 01/29/25    Thank you for your referral.  If you have any questions please contact MICHELLE Wagner M.S. CCC-SLP  Speech Language Pathologist  Phone Number [phone]

## 2025-01-28 NOTE — PLAN OF CARE
Problem: HEMATOLOGIC - ADULT  Goal: Free from bleeding injury  Description: (Example usage: patient with low platelets)  INTERVENTIONS:  - Avoid intramuscular injections, enemas and rectal medication administration  - Ensure safe mobilization of patient  - Hold pressure on venipuncture sites to achieve adequate hemostasis  - Assess for signs and symptoms of internal bleeding  - Monitor lab trends  - Patient is to report abnormal signs of bleeding to staff  - Avoid use of toothpicks and dental floss  - Use electric shaver for shaving  - Use soft bristle tooth brush  - Limit straining and forceful nose blowing  Outcome: Progressing     Problem: Patient Centered Care  Goal: Patient preferences are identified and integrated in the patient's plan of care  Description: Interventions:  - What would you like us to know as we care for you? From home with daughter   - Provide timely, complete, and accurate information to patient/family  - Incorporate patient and family knowledge, values, beliefs, and cultural backgrounds into the planning and delivery of care  - Encourage patient/family to participate in care and decision-making at the level they choose  - Honor patient and family perspectives and choices  Outcome: Progressing     Problem: Diabetes/Glucose Control  Goal: Glucose maintained within prescribed range  Description: INTERVENTIONS:  - Monitor Blood Glucose as ordered  - Assess for signs and symptoms of hyperglycemia and hypoglycemia  - Administer ordered medications to maintain glucose within target range  - Assess barriers to adequate nutritional intake and initiate nutrition consult as needed  - Instruct patient on self management of diabetes  Outcome: Progressing     Problem: CARDIOVASCULAR - ADULT  Goal: Maintains optimal cardiac output and hemodynamic stability  Description: INTERVENTIONS:  - Monitor vital signs, rhythm, and trends  - Monitor for bleeding, hypotension and signs of decreased cardiac output  -  Evaluate effectiveness of vasoactive medications to optimize hemodynamic stability  - Monitor arterial and/or venous puncture sites for bleeding and/or hematoma  - Assess quality of pulses, skin color and temperature  - Assess for signs of decreased coronary artery perfusion - ex. Angina  - Evaluate fluid balance, assess for edema, trend weights  Outcome: Progressing  Goal: Absence of cardiac arrhythmias or at baseline  Description: INTERVENTIONS:  - Continuous cardiac monitoring, monitor vital signs, obtain 12 lead EKG if indicated  - Evaluate effectiveness of antiarrhythmic and heart rate control medications as ordered  - Initiate emergency measures for life threatening arrhythmias  - Monitor electrolytes and administer replacement therapy as ordered  Outcome: Progressing     Problem: METABOLIC/FLUID AND ELECTROLYTES - ADULT  Goal: Glucose maintained within prescribed range  Description: INTERVENTIONS:  - Monitor Blood Glucose as ordered  - Assess for signs and symptoms of hyperglycemia and hypoglycemia  - Administer ordered medications to maintain glucose within target range  - Assess barriers to adequate nutritional intake and initiate nutrition consult as needed  - Instruct patient on self management of diabetes  Outcome: Progressing  Goal: Electrolytes maintained within normal limits  Description: INTERVENTIONS:  - Monitor labs and rhythm and assess patient for signs and symptoms of electrolyte imbalances  - Administer electrolyte replacement as ordered  - Monitor response to electrolyte replacements, including rhythm and repeat lab results as appropriate  - Fluid restriction as ordered  - Instruct patient on fluid and nutrition restrictions as appropriate  Outcome: Progressing  Goal: Hemodynamic stability and optimal renal function maintained  Description: INTERVENTIONS:  - Monitor labs and assess for signs and symptoms of volume excess or deficit  - Monitor intake, output and patient weight  - Monitor urine  specific gravity, serum osmolarity and serum sodium as indicated or ordered  - Monitor response to interventions for patient's volume status, including labs, urine output, blood pressure (other measures as available)  - Encourage oral intake as appropriate  - Instruct patient on fluid and nutrition restrictions as appropriate  Outcome: Progressing     Problem: NEUROLOGICAL - ADULT  Goal: Achieves stable or improved neurological status  Description: INTERVENTIONS  - Assess for and report changes in neurological status  - Initiate measures to prevent increased intracranial pressure  - Maintain blood pressure and fluid volume within ordered parameters to optimize cerebral perfusion and minimize risk of hemorrhage  - Monitor temperature, glucose, and sodium. Initiate appropriate interventions as ordered  Outcome: Progressing  Goal: Achieves maximal functionality and self care  Description: INTERVENTIONS  - Monitor swallowing and airway patency with patient fatigue and changes in neurological status  - Encourage and assist patient to increase activity and self care with guidance from PT/OT  - Encourage visually impaired, hearing impaired and aphasic patients to use assistive/communication devices  Outcome: Progressing     Problem: PAIN - ADULT  Goal: Verbalizes/displays adequate comfort level or patient's stated pain goal  Description: INTERVENTIONS:  - Encourage pt to monitor pain and request assistance  - Assess pain using appropriate pain scale  - Administer analgesics based on type and severity of pain and evaluate response  - Implement non-pharmacological measures as appropriate and evaluate response  - Consider cultural and social influences on pain and pain management  - Manage/alleviate anxiety  - Utilize distraction and/or relaxation techniques  - Monitor for opioid side effects  - Notify MD/LIP if interventions unsuccessful or patient reports new pain  - Anticipate increased pain with activity and pre-medicate  as appropriate  Outcome: Progressing     Problem: SAFETY ADULT - FALL  Goal: Free from fall injury  Description: INTERVENTIONS:  - Assess pt frequently for physical needs  - Identify cognitive and physical deficits and behaviors that affect risk of falls.  - [place] fall precautions as indicated by assessment.  - Educate pt/family on patient safety including physical limitations  - Instruct pt to call for assistance with activity based on assessment  - Modify environment to reduce risk of injury  - Provide assistive devices as appropriate  - Consider OT/PT consult to assist with strengthening/mobility  - Encourage toileting schedule  Outcome: Progressing     Problem: DISCHARGE PLANNING  Goal: Discharge to home or other facility with appropriate resources  Description: INTERVENTIONS:  - Identify barriers to discharge w/pt and caregiver  - Include patient/family/discharge partner in discharge planning  - Arrange for needed discharge resources and transportation as appropriate  - Identify discharge learning needs (meds, wound care, etc)  - Arrange for interpreters to assist at discharge as needed  - Consider post-discharge preferences of patient/family/discharge partner  - Complete POLST form as appropriate  - Assess patient's ability to be responsible for managing their own health  - Refer to Case Management Department for coordinating discharge planning if the patient needs post-hospital services based on physician/LIP order or complex needs related to functional status, cognitive ability or social support system  Outcome: Progressing     Problem: SKIN/TISSUE INTEGRITY - ADULT  Goal: Skin integrity remains intact  Description: INTERVENTIONS  - Assess and document risk factors for pressure ulcer development  - Assess and document skin integrity  - Monitor for areas of redness and/or skin breakdown  - Initiate interventions, skin care algorithm/standards of care as needed  Outcome: Progressing     Problem:  MUSCULOSKELETAL - ADULT  Goal: Return mobility to safest level of function  Description: INTERVENTIONS:  - Assess patient stability and activity tolerance for standing, transferring and ambulating w/ or w/o assistive devices  - Assist with transfers and ambulation using safe patient handling equipment as needed  - Ensure adequate protection for wounds/incisions during mobilization  - Obtain PT/OT consults as needed  - Advance activity as appropriate  - Communicate ordered activity level and limitations with patient/family  Outcome: Progressing     Problem: Impaired Swallowing  Goal: Minimize aspiration risk  Description: Interventions:  - Patient should be alert and upright for all feedings (90 degrees preferred)  - Offer food and liquids at a slow rate  - No straws  - Encourage small bites of food and small sips of liquid  - Offer pills one at a time, crush or deliver with applesauce as needed  - Discontinue feeding and notify MD (or speech pathologist) if coughing or persistent throat clearing or wet/gurgly vocal quality is noted  Outcome: Progressing

## 2025-01-28 NOTE — PROGRESS NOTES
Health system  Vascular Surgery Progress Note    Chester Bautista Patient Status:  Inpatient    1940 MRN [id number]   Location Health system 2W/SW Attending Bonny Carranza MD   Hosp Day # 9 PCP Heike Sorto MD     Objective:   Temp: 97.7 °F (36.5 °C)  Pulse: 107  Resp: 20  BP: 102/63    Intake/Output:     Intake/Output Summary (Last 24 hours) at 2025 1816  Last data filed at 2025 0500  Gross per 24 hour   Intake 2071.4 ml   Output 300 ml   Net 1771.4 ml       Events Yesterday/Overnight:  Visited the patient at the bedside.  She continues of some chest discomfort which is more of shortness of breath.  She continues to be on room air, saturating fine.  Has a history of COPD and lung fibrosis.    Exam:  Vital signs are within normal limits.  She has a strong right upper extremity, however the proximal motor compartment of the right lower extremity is slightly weak (4/5)    Impression/Plan:  84 year old female with past medical history of COPD, DM, HTN and gastric AVM complicated with recurrent GI bleed who presented with left cerebellar stroke on Saturday, 2025.  Her hospital course was complicated with another stroke identified in the left thalamus on MRI on 2025.  She is S/P L SCA Stent. Post-operative course was notable for severe headache which was worked up with a CT head being concerning for SAH. However the subsequent CT scan ruled out SAH, but were notable for new small ischemic strokes. MRI on 2025 was notable for left parietal, occipital and temporal regions as well as bilateral frontal cortext. Patient had altered mental status on 2025, however since yesterday she is alert and oriented with motor/sensory function close to her baseline. She was started on her Berlinta.     Plan:  -Recommend continuation of Brilinta for at least 6 months.  - Not sure how to explain patient's respiratory symptoms.  There is a chest x-ray ordered for this morning that we  will follow-up with the results.  - Please call with questions or concerns.    Medications:    Current Facility-Administered Medications:     heparin (Porcine) 1000 UNIT/ML injection, , ,     glucose (Dex4) 15 GM/59ML oral liquid 15 g, 15 g, Oral, Q15 Min PRN **OR** glucose (Glutose) 40% oral gel 15 g, 15 g, Oral, Q15 Min PRN **OR** glucose-vitamin C (Dex-4) chewable tab 4 tablet, 4 tablet, Oral, Q15 Min PRN **OR** dextrose 50% injection 50 mL, 50 mL, Intravenous, Q15 Min PRN **OR** glucose (Dex4) 15 GM/59ML oral liquid 30 g, 30 g, Oral, Q15 Min PRN **OR** glucose (Glutose) 40% oral gel 30 g, 30 g, Oral, Q15 Min PRN **OR** glucose-vitamin C (Dex-4) chewable tab 8 tablet, 8 tablet, Oral, Q15 Min PRN    dextrose 5%-sodium chloride 0.9% infusion, , Intravenous, Continuous    ticagrelor (Brilinta) tab 90 mg, 90 mg, Oral, Q12H    lidocaine-menthol 4-1 % patch 1 patch, 1 patch, Transdermal, Daily    labetalol (Trandate) 5 mg/mL injection 10 mg, 10 mg, Intravenous, Q4H PRN    midazolam (Versed) 2 MG/2ML injection 2 mg, 2 mg, Intravenous, Once    octreoTIDe (SandoSTATIN) 50 mcg/mL injection 50 mcg, 50 mcg, Subcutaneous, Q8H    HYDROmorphone (Dilaudid) 1 MG/ML injection 0.2 mg, 0.2 mg, Intravenous, Q4H PRN    pantoprazole (Protonix) 40 mg in sodium chloride 0.9% PF 10 mL IV push, 40 mg, Intravenous, Q12H    sodium chloride 0.9% infusion, , Intravenous, Continuous    rosuvastatin (Crestor) tab 10 mg, 10 mg, Oral, Nightly    sennosides (Senokot) tab 8.6 mg, 8.6 mg, Oral, BID    ceFEPIme (Maxipime) 1 g in sodium chloride 0.9% 100 mL IVPB-MBP, 1 g, Intravenous, Q24H    cetirizine (ZyrTEC) tab 5 mg, 5 mg, Oral, Daily    acetaminophen (Tylenol Extra Strength) tab 500 mg, 500 mg, Oral, Q6H PRN    docusate sodium (Colace) cap 100 mg, 100 mg, Oral, BID    polyethylene glycol (PEG 3350) (Miralax) 17 g oral packet 17 g, 17 g, Oral, Daily PRN    magnesium hydroxide (Milk of Magnesia) 400 MG/5ML oral suspension 30 mL, 30 mL, Oral, Daily  PRN    bisacodyl (Dulcolax) 10 MG rectal suppository 10 mg, 10 mg, Rectal, Daily PRN    fleet enema (Fleet) rectal enema 133 mL, 1 enema, Rectal, Once PRN    meclizine (Antivert) tab 12.5 mg, 12.5 mg, Oral, TID PRN    acetaminophen (Tylenol) tab 650 mg, 650 mg, Oral, Q4H PRN **OR** acetaminophen (Tylenol) rectal suppository 650 mg, 650 mg, Rectal, Q4H PRN    hydrALAzine (Apresoline) 20 mg/mL injection 10 mg, 10 mg, Intravenous, Q2H PRN    ondansetron (Zofran) 4 MG/2ML injection 4 mg, 4 mg, Intravenous, Q6H PRN    metoclopramide (Reglan) 5 mg/mL injection 5 mg, 5 mg, Intravenous, Q8H PRN    acetaminophen (Tylenol) tab 650 mg, 650 mg, Oral, Q6H PRN    ondansetron (Zofran) 4 MG/2ML injection 4 mg, 4 mg, Intravenous, Q6H PRN    albuterol (Ventolin) (2.5 MG/3ML) 0.083% nebulizer solution 2.5 mg, 2.5 mg, Nebulization, Q4H PRN    carvedilol (Coreg) tab 12.5 mg, 12.5 mg, Oral, BID with meals    cyclobenzaprine (Flexeril) tab 10 mg, 10 mg, Oral, Nightly PRN    fluticasone-salmeterol (Advair Diskus) 250-50 MCG/ACT inhaler 1 puff, 1 puff, Inhalation, 2 times per day    gabapentin (Neurontin) cap 100 mg, 100 mg, Oral, Nightly    montelukast (Singulair) tab 10 mg, 10 mg, Oral, Nightly    predniSONE (Deltasone) tab 5 mg, 5 mg, Oral, Daily    losartan (Cozaar) tab 50 mg, 50 mg, Oral, Daily    hydroxychloroquine (Plaquenil) tab 300 mg, 300 mg, Oral, Daily    oxyCODONE immediate release tab 5 mg, 5 mg, Oral, Q6H PRN    insulin aspart (NovoLOG) 100 Units/mL FlexPen 1-11 Units, 1-11 Units, Subcutaneous, TID CC    insulin degludec (Tresiba) 100 units/mL flextouch 10 Units, 10 Units, Subcutaneous, Nightly    Laboratory/Data:    LABS:  Recent Labs   Lab 01/23/25  0702 01/24/25  1519 01/25/25  0332 01/25/25  0900 01/25/25  1604 01/25/25  2357 01/26/25  0849 01/26/25  1730 01/27/25  0031 01/27/25  0514 01/27/25  1009   WBC 12.1* 13.4* 14.9*  --  14.5*  --   --   --   --  17.2*  --    HGB 7.7* 6.4* 8.0*   < > 7.7*   < > 7.6* 8.0* 7.6* 7.4*  7.8*   MCV 82.6 79.9* 79.6*  --  80.5  --   --   --   --  81.0  --    .0 353.0 375.0  375.0  --  336.0  --   --   --   --  340.0  --    INR  --   --  1.04  --   --   --   --   --   --   --   --     < > = values in this interval not displayed.       Recent Labs   Lab 01/22/25  0659 01/23/25  0702 01/24/25  1519 01/25/25  0652 01/26/25  0518 01/26/25  1730 01/27/25  0514    139 141 142 139  --  143   K 3.7 4.0  4.0 3.5 3.9  3.9 2.9* 3.8 3.5  3.5    106 105 108 107  --  111   CO2 26.0 23.0 27.0 24.0 22.0  --  23.0   BUN 23 19 23 16 11  --  8*   CREATSERUM 1.25* 1.24* 1.23* 1.03* 0.88  --  0.89   * 193* 261* 130* 65*  --  252*   CA 10.3 10.3 9.7 10.1 9.8  --  9.1   MG 1.6 1.5* 1.5* 2.1 1.6  --  1.5*   PHOS 4.0  --   --   --   --   --   --      Recent Labs   Lab 01/25/25  0332   PTP 14.3   INR 1.04   PTT 26.6     Recent Labs   Lab 01/23/25  0702 01/24/25  1519 01/25/25  0652 01/26/25  0518 01/27/25  0514   ALT <7* <7* 9* <7* <7*   AST 18 18 18 23 20   ALB 3.6 3.2 3.6 3.4 3.2     No results for input(s): \"TROP\" in the last 168 hours.  Lab Results   Component Value Date    ANASCRN Negative 08/05/2022         Lisandra Long MD  [facility], Division of Vascular Surgery  1/27/2025  6:16 PM

## 2025-01-28 NOTE — PROGRESS NOTES
[facility]  [address], [address]  [facility] [address]  [phone]          INPATIENT STROKE NEUROLOGY   FOLLOW UP PROGRESS NOTE  [facility]  part of [facility]    Chester Bautista Patient Status:  Inpatient     1940 MRN [id number]    Location [facility] 2W/SW Attending Bonny Carranza MD    Hosp Day # 10 PCP Heike Sorto MD    Date of Admission:  2025  Date of Consult Follow Up:  25       Assessment and Plan:   Chester Bautista is a 84 year old woman w/ a pmhx of 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  Patient also found to have new subsegmental PEs on admission.    She has had increasing burden of stroke throughout her hospital stay.  Initially unable to anticoagulate because of her longstanding history of GI bleeding due to numerous AVMs.  Ultimately decided to start heparin drip on 2025 with PTT goal of 60-90 no boluses.  Also on antiplatelet therapy for left vertebral artery stent.    Initially she had left-sided posterior circulation strokes in her cerebellum on admission and a new left thalamic stroke on  now status post vertebral artery stenting on  C/B acute blood loss anemia requiring 1 unit of PRBCs.  Then had bihemispheric embolic strokes on 2025.    Per prior discussions with GI service patient is at high risk of recurrent bleeding due to her AVMs and the potential harms of anticoagulation would outweigh the potential benefits.   Therefore she was started on Plavix and then switched to Brilinta.    After her vertebral artery stenting the she complained of a headache with migrainous features.  She had a stat head CT which demonstrated contrast exacerbation versus new subarachnoid hemorrhage.  She had a repeat head CT 6 hours later which demonstrated continued improvement of her subarachnoid hemorrhage, and her repeat head CT at 6 AM on 1/25/2025 demonstrate near resolution of the contrast/subarachnoid blood.  Patient was maintained on her antiplatelets.  Repeat exam on the morning of 1/25/2025 demonstrated new global aphasia, and a questionable right visual field deficit.  She had a repeat stat head CT and CTA head and neck that did not demonstrate any interval any interval large vessel occlusion.  However, her repeat MRI of the brain demonstrates interval infarcts in her left parietal/occipital region, left posterior temporal lobe, small medial occipital lobe infarct,   L>R frontal lobe/motor cortex, and left frontal lobe. She has infarct in her anterior and posterior circulation.    Her exam   improved on 1/26/2025 however she still has persistent right hemianopsia and R LE > U UE weakness.    Restarted her Brilinta 90 mg twice daily on 1/26/2025.  Reassessment 1/27/2025; patient had a new oxygen requirement.  She was now on nasal cannula.  Discussed with critical care service who recommended stat lower extremity Dopplers and concurred that she would benefit from low intensity anticoagulation.    1/28/2025 her heparin drip with PTT goal of 60-90 was started.  There was no bolus.  Antiplatelets changed from Brilinta 90 mg twice daily to aspirin 81 mg daily.  Anticoagulation will tentatively be held on 1/29/2025 for GI procedure.  On the afternoon of the 28th patient complained of 5 out of 10 headache with dizziness and nausea.  Reported the dizziness was new.  Heparin drip was held.  She had a stat head CT that was negative for any acute  hemorrhage.  Her dizziness is improved and headache resolved spontaneously.    She was not a candidate for tPA because she is out of the time window and because of her history of recurrent GI bleeds and AVMs.  Not can for thrombectomy because no LVO.    Overall main plan is to start the patient on heparin drip and monitor for any signs of bleeding.  Will need to determine what her long-term anticoagulation will be.     Recurrent strokes due to chronic descending aorta mural thrombus in the setting of  chronic anemia due to numerous GI AVMs  Differential Diagnosis for stroke/TIA mechanism:  Embolic due to mural thrombus in her aorta; will need to determine why she has such extensive thrombosis of her aorta.  May be related to atherosclerotic disease.       Plan    Diagnostics:  Imaging: MRI brain WO, CT brain WO, and CTA head/neck  ; stat CTA head head and neck if there exam worsens again.  Repeat head CT on 1/28/2025 for new onset headache while on heparin drip  Cardiac imaging: Telemetry and TTE    Labs:  trend hemoglobin  and PTTs  Therapeutics:  Blood pressure goal: SBP Less  160  Please avoid blood pressure variability. wide fluctuations in BP can lead to stroke expansion.    PRN hydralazine and labetalol for sustained pressures outside of goal  Neuro checks Q2.  Telemetry.NIH stroke scale per protocol.  Blood glucose goal: .  Accuchecks Q6 if patient has DM  closely monitor to prevent hypoglycemia in patients with AIS.  Antithrombotics: is for low intensity anticoagulation with a PTT goal of 60-90 and no boluses starting on 1/28/2025.  Lipid-lowering agents: Cholesterol Meds: rosuvastatin - 10 MG; rosuvastatin Tabs - 20 MG     Avoid hypotonic fluids as this can worsen cerebral edema.  Physical therapy, Occupational Therapy, speech therapy evaluations ordered.  maintain oxygen saturation >94%. No supplemental oxygen  in nonhypoxic patients with acute ischemic stroke (AIS).  Tx hyperthermia (temperature  >38°C) and identify source  STAT head CT and page neurology if any change in neurological exam or focal deficits.    Long term secondary stroke prevention goals:  Home BP monitoring. Pt instructed to check BP at home in the AM and PM, and bring values to future clinic visits. BP goal is for normotension, < 130/80.  HbA1c goal <7.0  LDL-C goal  <70 mg/dL.  Target total cholesterol < 200 mg/dL Target HDL >45 mg/dL for men, >55 mg/dL for women, Target triglycerides <150 mg/dL  Physical inactivity - target exercise at least 3 times per week of moderate intensity activity for 20 minutes.  Obesity - target ideal body weight and girth <35\" for women, <40\" for men  Smoking - Target smoking cessation    2. Left shoulder pain  Lidocaine patch         INTERVAL EVENTS  01/25/25: Neurological exam was worse this morning.  Patient was aphasic.  She could only say \"1, 2, and yes.\"  ?Left gaze preference but could look to the right when asked specifically.  Questionable incongruent right hemianopsia..  Followed some axial and appendicular commands.  Repeat CTA for aphasia.  Repeat MRI demonstrated new infarct.  New motor deficits noted by RN at approximately 4:30 PM.  01/26/25  1:40 AM -acutely agitated.  Reported acute plan.  Complaint of significant visual field deficits.  Noted to have worsening drift in right upper extremity.  Neurological exam improved compared to 1/25/2025.  Restarted Brilinta 90 mg twice daily  01/27/25: New oxygen requirement.  Patient has nasal cannula in nares.  Reports of orthopnea which is new.  Repeat Dopplers ordered.  01/28/25:her heparin drip with PTT goal of 60-90 was started.  There was no bolus.  Antiplatelets changed from Brilinta 90 mg twice daily to aspirin 81 mg daily.         SUBJECTIVE:   Patient was seen laying in bed.  Reported sudden onset 5 out of 10 headache.  He did not intensity over a matter of minutes.  Also associated with new onset dizziness notes.  Denied any prior episodes of  dizziness.  Reports the dizziness was so intense that she would not be unable to walk.  + Room spinning sensation.  + Nausea.  Heparin drip was paused.  Stat head CT stable.  Patient was reassessed and CT.  Reported that her dizziness was intermittent.  Nausea was improving.  When she was back in her room she reported her headache had improved significantly.  No new neurological deficits.      Pertinent positive and negatives per HPI.  All others were reviewed and negative.       Objective   OBJECTIVE:   Last vitals and weight :  Blood pressure 154/69, pulse 99, temperature 97.4 °F (36.3 °C), temperature source Temporal, resp. rate 22, height 61\", weight 135 lb 9.6 oz (61.5 kg), SpO2 99%.   Vitals:    01/28/25 1300 01/28/25 1400 01/28/25 1500 01/28/25 1600   BP: (!) 171/84 (!) 177/86 (!) 187/77 154/69   BP Location: Right leg Right leg Right leg    Pulse: 95 90 89 99   Resp: (!) 27 20 20 22   Temp:       TempSrc:       SpO2: 97% 97% 99% 99%   Weight:       Height:          Exam:  - General: appears older than stated age and no distress  - CV: Symmetric pulses.   Carotids:   - Pulmonary: no overt sign of respiratory distress.    Neurologic Exam  - Mental Status: She is awake and alert.  She is pleasant and cooperative.  She has insight into her deficits.  She is able to name.  She is able to repeat.  She is oriented to self.  She knew her location, the month and the year.    - Cranial Nerves: No gaze preference.  Visual fields: Incongruent right hemianopsia.  Pupils are 4mm briskly constricting to 3mm and equally round and reactive to light  in a well lit room. EOMI. No nystagmus. No ptosis. V1-V3 intact B/L to light touch.No pathological facial asymmetry. No flattening of the nasolabial fold. .  Hearing grossly intact.  Tongue midline. No atrophy or fasiculations of the tongue noted. Palate and uvula elevate symmetrically.  Shoulder shrug symmetric.    - Motor:   diffusely Decreased bulk.    Pronator drift: No  pronator drift .  Keep both arms elevated antigravity for 10 seconds.  She does have weakness in her right arm and has difficulty with finger-nose in her right arm because of her weakness.    Leg Drift: Able to keep left leg antigravity for 5 seconds.  Right leg drift.  - Sensory:   Light touch: normal  - Cerebellum: No truncal ataxia. No titubations. No dysmetria.         Medications:   aspirin  81 mg Oral Daily    carvedilol  25 mg Oral BID with meals    lidocaine-menthol  1 patch Transdermal Daily    midazolam  2 mg Intravenous Once    octreoTIDe  50 mcg Subcutaneous Q8H    pantoprazole  40 mg Intravenous Q12H    rosuvastatin  10 mg Oral Nightly    sennosides  8.6 mg Oral BID    cefepime  1 g Intravenous Q24H    cetirizine  5 mg Oral Daily    docusate sodium  100 mg Oral BID    fluticasone-salmeterol  1 puff Inhalation 2 times per day    gabapentin  100 mg Oral Nightly    montelukast  10 mg Oral Nightly    predniSONE  5 mg Oral Daily    losartan  50 mg Oral Daily    hydroxychloroquine  300 mg Oral Daily    insulin aspart  1-11 Units Subcutaneous TID CC    insulin degludec  10 Units Subcutaneous Nightly       PRNS:   labetalol    glucose **OR** glucose **OR** glucose-vitamin C **OR** dextrose **OR** glucose **OR** glucose **OR** glucose-vitamin C    HYDROmorphone    acetaminophen    polyethylene glycol (PEG 3350)    magnesium hydroxide    bisacodyl    fleet enema    meclizine    acetaminophen **OR** acetaminophen    hydrALAzine    ondansetron    metoclopramide    acetaminophen    ondansetron    albuterol    cyclobenzaprine    oxyCODONE    Infusions:    continuous dose heparin 950 Units/hr (01/28/25 1315)    sodium chloride Stopped (01/23/25 1630)          Results:   Laboratory Data:  Lab Results   Component Value Date    WBC 13.3 (H) 01/28/2025    HGB 7.4 (L) 01/28/2025    HCT 23.4 (L) 01/28/2025    .0 01/28/2025    CREATSERUM 0.95 01/28/2025    BUN 8 (L) 01/28/2025     01/28/2025    K 3.6 01/28/2025      01/28/2025    CO2 23.0 01/28/2025     (H) 01/28/2025    CA 9.5 01/28/2025    ALB 3.3 01/28/2025    ALKPHO 80 01/28/2025    TP 6.6 01/28/2025    AST 22 01/28/2025    ALT <7 (L) 01/28/2025    PTT 58.8 (H) 01/28/2025    INR 1.04 01/25/2025    PTP 14.3 01/25/2025    T4F 1.2 01/17/2025    TSH 0.418 (L) 01/17/2025    DDIMER 2.77 (H) 01/25/2025    ESRML >130 (H) 11/29/2024    CRP 3.00 (H) 11/29/2024    MG 1.6 01/28/2025    PHOS 4.0 01/22/2025    B12 814 11/01/2024     Recent Results (from the past 72 hours)   POCT Glucose    Collection Time: 01/25/25  9:20 PM   Result Value Ref Range    POC Glucose  124 (H) 70 - 99 mg/dL   Hemoglobin & Hematocrit    Collection Time: 01/25/25 11:57 PM   Result Value Ref Range    HGB 7.4 (L) 12.0 - 16.0 g/dL    HCT 23.7 (L) 35.0 - 48.0 %   Prepare RBC Once    Collection Time: 01/26/25 12:40 AM   Result Value Ref Range    Blood Product [id number]     Unit Number [id number]     UNIT ABO B     UNIT RH POS     Product Status Presumed Transfused     Expiration Date 202502222359     Blood Type Barcode 7300     Unit Volume 350 ml   Prepare platelets Once    Collection Time: 01/26/25 12:40 AM   Result Value Ref Range    Blood Product [id number]     Unit Number [id number]     UNIT ABO A     UNIT RH POS     Product Status Presumed Transfused     Expiration Date 202501272359     Blood Type Barcode 6200     Unit Volume 279 ml   Comp Metabolic Panel (14)    Collection Time: 01/26/25  5:18 AM   Result Value Ref Range    Glucose 65 (L) 70 - 99 mg/dL    Sodium 139 136 - 145 mmol/L    Potassium 2.9 (LL) 3.5 - 5.1 mmol/L    Chloride 107 98 - 112 mmol/L    CO2 22.0 21.0 - 32.0 mmol/L    Anion Gap 10 0 - 18 mmol/L    BUN 11 9 - 23 mg/dL    Creatinine 0.88 0.55 - 1.02 mg/dL    BUN/CREA Ratio 12.5 10.0 - 20.0    Calcium, Total 9.8 8.7 - 10.4 mg/dL    Calculated Osmolality 286 275 - 295 mOsm/kg    eGFR-Cr 64 >=60 mL/min/1.73m2    ALT <7 (L) 10 - 49 U/L    AST 23 <34 U/L    Alkaline  Phosphatase 71 55 - 142 U/L    Bilirubin, Total 0.4 0.2 - 1.1 mg/dL    Total Protein 6.5 5.7 - 8.2 g/dL    Albumin 3.4 3.2 - 4.8 g/dL    Globulin  3.1 2.0 - 3.5 g/dL    A/G Ratio 1.1 1.0 - 2.0   Magnesium    Collection Time: 01/26/25  5:18 AM   Result Value Ref Range    Magnesium 1.6 1.6 - 2.6 mg/dL   POCT Glucose    Collection Time: 01/26/25  7:18 AM   Result Value Ref Range    POC Glucose  67 (L) 70 - 99 mg/dL   POCT Glucose    Collection Time: 01/26/25  7:42 AM   Result Value Ref Range    POC Glucose  86 70 - 99 mg/dL   POCT Glucose    Collection Time: 01/26/25  8:45 AM   Result Value Ref Range    POC Glucose  196 (H) 70 - 99 mg/dL   Hemoglobin & Hematocrit    Collection Time: 01/26/25  8:49 AM   Result Value Ref Range    HGB 7.6 (L) 12.0 - 16.0 g/dL    HCT 24.5 (L) 35.0 - 48.0 %   POCT Glucose    Collection Time: 01/26/25 10:08 AM   Result Value Ref Range    POC Glucose  235 (H) 70 - 99 mg/dL   POCT Glucose    Collection Time: 01/26/25 12:23 PM   Result Value Ref Range    POC Glucose  264 (H) 70 - 99 mg/dL   Hemoglobin & Hematocrit    Collection Time: 01/26/25  5:30 PM   Result Value Ref Range    HGB 8.0 (L) 12.0 - 16.0 g/dL    HCT 24.7 (L) 35.0 - 48.0 %   Potassium    Collection Time: 01/26/25  5:30 PM   Result Value Ref Range    Potassium 3.8 3.5 - 5.1 mmol/L   POCT Glucose    Collection Time: 01/26/25  6:44 PM   Result Value Ref Range    POC Glucose  265 (H) 70 - 99 mg/dL   POCT Glucose    Collection Time: 01/26/25  8:56 PM   Result Value Ref Range    POC Glucose  248 (H) 70 - 99 mg/dL   Hemoglobin & Hematocrit    Collection Time: 01/27/25 12:31 AM   Result Value Ref Range    HGB 7.6 (L) 12.0 - 16.0 g/dL    HCT 23.3 (L) 35.0 - 48.0 %   Magnesium    Collection Time: 01/27/25  5:14 AM   Result Value Ref Range    Magnesium 1.5 (L) 1.6 - 2.6 mg/dL   Comp Metabolic Panel (14)    Collection Time: 01/27/25  5:14 AM   Result Value Ref Range    Glucose 252 (H) 70 - 99 mg/dL    Sodium 143 136 - 145 mmol/L    Potassium  3.5 3.5 - 5.1 mmol/L    Chloride 111 98 - 112 mmol/L    CO2 23.0 21.0 - 32.0 mmol/L    Anion Gap 9 0 - 18 mmol/L    BUN 8 (L) 9 - 23 mg/dL    Creatinine 0.89 0.55 - 1.02 mg/dL    BUN/CREA Ratio 9.0 (L) 10.0 - 20.0    Calcium, Total 9.1 8.7 - 10.4 mg/dL    Calculated Osmolality 303 (H) 275 - 295 mOsm/kg    eGFR-Cr 63 >=60 mL/min/1.73m2    ALT <7 (L) 10 - 49 U/L    AST 20 <34 U/L    Alkaline Phosphatase 76 55 - 142 U/L    Bilirubin, Total 0.4 0.2 - 1.1 mg/dL    Total Protein 6.3 5.7 - 8.2 g/dL    Albumin 3.2 3.2 - 4.8 g/dL    Globulin  3.1 2.0 - 3.5 g/dL    A/G Ratio 1.0 1.0 - 2.0   CBC With Differential With Platelet    Collection Time: 01/27/25  5:14 AM   Result Value Ref Range    WBC 17.2 (H) 4.0 - 11.0 x10(3) uL    RBC 3.00 (L) 3.80 - 5.30 x10(6)uL    HGB 7.4 (L) 12.0 - 16.0 g/dL    HCT 24.3 (L) 35.0 - 48.0 %    MCV 81.0 80.0 - 100.0 fL    MCH 24.7 (L) 26.0 - 34.0 pg    MCHC 30.5 (L) 31.0 - 37.0 g/dL    RDW-SD 49.1 (H) 35.1 - 46.3 fL    RDW 16.8 (H) 11.0 - 15.0 %    .0 150.0 - 450.0 10(3)uL    Neutrophil Absolute Prelim 13.37 (H) 1.50 - 7.70 x10 (3) uL    Neutrophil Absolute 13.37 (H) 1.50 - 7.70 x10(3) uL    Lymphocyte Absolute 1.65 1.00 - 4.00 x10(3) uL    Monocyte Absolute 1.52 (H) 0.10 - 1.00 x10(3) uL    Eosinophil Absolute 0.50 0.00 - 0.70 x10(3) uL    Basophil Absolute 0.06 0.00 - 0.20 x10(3) uL    Immature Granulocyte Absolute 0.11 0.00 - 1.00 x10(3) uL    Neutrophil % 77.8 %    Lymphocyte % 9.6 %    Monocyte % 8.8 %    Eosinophil % 2.9 %    Basophil % 0.3 %    Immature Granulocyte % 0.6 %   Potassium    Collection Time: 01/27/25  5:14 AM   Result Value Ref Range    Potassium 3.5 3.5 - 5.1 mmol/L   Prepare RBC Once    Collection Time: 01/27/25  6:53 AM   Result Value Ref Range    Blood Product [id number]     Unit Number [id number]     UNIT ABO B     UNIT RH POS     Product Status Released from Crossmatch     Expiration Date [id number]     Blood Type Barcode 7300     Unit Volume 350 ml   POCT  Glucose    Collection Time: 01/27/25  9:55 AM   Result Value Ref Range    POC Glucose  263 (H) 70 - 99 mg/dL   Hemoglobin & Hematocrit    Collection Time: 01/27/25 10:09 AM   Result Value Ref Range    HGB 7.8 (L) 12.0 - 16.0 g/dL    HCT 25.0 (L) 35.0 - 48.0 %   POCT Glucose    Collection Time: 01/27/25  5:33 PM   Result Value Ref Range    POC Glucose  275 (H) 70 - 99 mg/dL   POCT Glucose    Collection Time: 01/27/25  8:40 PM   Result Value Ref Range    POC Glucose  207 (H) 70 - 99 mg/dL   Potassium    Collection Time: 01/27/25 10:54 PM   Result Value Ref Range    Potassium 4.0 3.5 - 5.1 mmol/L   CBC With Differential With Platelet    Collection Time: 01/28/25  5:36 AM   Result Value Ref Range    WBC 13.3 (H) 4.0 - 11.0 x10(3) uL    RBC 2.96 (L) 3.80 - 5.30 x10(6)uL    HGB 7.4 (L) 12.0 - 16.0 g/dL    HCT 23.4 (L) 35.0 - 48.0 %    MCV 79.1 (L) 80.0 - 100.0 fL    MCH 25.0 (L) 26.0 - 34.0 pg    MCHC 31.6 31.0 - 37.0 g/dL    RDW-SD 49.2 (H) 35.1 - 46.3 fL    RDW 17.2 (H) 11.0 - 15.0 %    .0 150.0 - 450.0 10(3)uL    Neutrophil Absolute Prelim 9.39 (H) 1.50 - 7.70 x10 (3) uL    Neutrophil Absolute 9.39 (H) 1.50 - 7.70 x10(3) uL    Lymphocyte Absolute 2.05 1.00 - 4.00 x10(3) uL    Monocyte Absolute 1.25 (H) 0.10 - 1.00 x10(3) uL    Eosinophil Absolute 0.45 0.00 - 0.70 x10(3) uL    Basophil Absolute 0.06 0.00 - 0.20 x10(3) uL    Immature Granulocyte Absolute 0.10 0.00 - 1.00 x10(3) uL    Neutrophil % 70.5 %    Lymphocyte % 15.4 %    Monocyte % 9.4 %    Eosinophil % 3.4 %    Basophil % 0.5 %    Immature Granulocyte % 0.8 %   Magnesium    Collection Time: 01/28/25  5:59 AM   Result Value Ref Range    Magnesium 1.6 1.6 - 2.6 mg/dL   Comp Metabolic Panel (14)    Collection Time: 01/28/25  5:59 AM   Result Value Ref Range    Glucose 151 (H) 70 - 99 mg/dL    Sodium 143 136 - 145 mmol/L    Potassium 3.6 3.5 - 5.1 mmol/L    Chloride 111 98 - 112 mmol/L    CO2 23.0 21.0 - 32.0 mmol/L    Anion Gap 9 0 - 18 mmol/L    BUN 8 (L) 9  - 23 mg/dL    Creatinine 0.95 0.55 - 1.02 mg/dL    BUN/CREA Ratio 8.4 (L) 10.0 - 20.0    Calcium, Total 9.5 8.7 - 10.4 mg/dL    Calculated Osmolality 297 (H) 275 - 295 mOsm/kg    eGFR-Cr 59 (L) >=60 mL/min/1.73m2    ALT <7 (L) 10 - 49 U/L    AST 22 <34 U/L    Alkaline Phosphatase 80 55 - 142 U/L    Bilirubin, Total 0.5 0.2 - 1.1 mg/dL    Total Protein 6.6 5.7 - 8.2 g/dL    Albumin 3.3 3.2 - 4.8 g/dL    Globulin  3.3 2.0 - 3.5 g/dL    A/G Ratio 1.0 1.0 - 2.0   Prepare fresh frozen plasma Once    Collection Time: 01/28/25  6:53 AM   Result Value Ref Range    Blood Product [id number]     Unit Number [id number]     UNIT ABO B     UNIT RH POS     Product Status Released from Crossmatch     Expiration Date [id number]     Blood Type Barcode 7300     Unit Volume 316 ml   PTT, Activated    Collection Time: 01/28/25  9:47 AM   Result Value Ref Range    PTT 32.1 23.0 - 36.0 seconds   POCT Glucose    Collection Time: 01/28/25 10:44 AM   Result Value Ref Range    POC Glucose  137 (H) 70 - 99 mg/dL   PTT, Activated    Collection Time: 01/28/25  1:59 PM   Result Value Ref Range    PTT 58.8 (H) 23.0 - 36.0 seconds            Last A1c was done on 1/18/2025.       Test results/Imaging:   CT BRAIN OR HEAD (CPT=70450)    Result Date: 1/28/2025  CONCLUSION:  1.  Findings compatible with recent/subacute infarcts within the bilateral temporal lobes, left parietal-occipital lobe junction, and scattered throughout the frontal lobe near the vertex.  These findings as well as other foci are better visualized on recent preceding MRI.  No hemorrhage. 2.  Post right frontal craniotomy.  Post vascular clip in the anterior cerebral artery distribution.    Dictated by (CST): Fermín Castro MD on 1/28/2025 at 5:23 PM     Finalized by (CST): Fermín Castro MD on 1/28/2025 at 5:26 PM          XR CHEST AP PORTABLE  (CPT=71045)    Result Date: 1/28/2025  CONCLUSION:  1. Cardiomegaly tortuous thoracic aorta 2. Bilateral mixed alveolar and interstitial  multifocal airspace opacification with interval progression. 3. Bilateral central venous catheters with the tip in the distal SVC near the atrial junction.  No pneumothorax.  Left-sided Mediastinal vascular stent in place.     Dictated by (CST): Louis Moreno MD on 1/28/2025 at 8:25 AM     Finalized by (CST): Louis Moreno MD on 1/28/2025 at 8:29 AM          US VENOUS DOPPLER LEG BILAT - DIAG IMG (CPT=93970)    Result Date: 1/27/2025  CONCLUSION: No evidence of left or right lower extremity DVT.   Dictated by (CST): Fermín Castro MD on 1/27/2025 at 9:51 PM     Finalized by (CST): Fermín Castro MD on 1/27/2025 at 9:53 PM          XR PORT VENOUS W FLUORO INJ (AOR=15376)    Result Date: 1/27/2025  CONCLUSION: Fibrin sheath around the distal 5 cm segment of the Port-A-Cath resulting in inability to aspirate.  Please note, however, that the Port-A-Cath is in good position and can be used for injection as contrast/fluid is able to pass around the fibrin sheath into the right atrium.    Dictated by (CST): Parker Keane MD on 1/27/2025 at 1:52 PM     Finalized by (CST): Parker Keane MD on 1/27/2025 at 1:55 PM               CT BRAIN OR HEAD (CPT=70450)    Result Date: 1/28/2025  CONCLUSION:  1.  Findings compatible with recent/subacute infarcts within the bilateral temporal lobes, left parietal-occipital lobe junction, and scattered throughout the frontal lobe near the vertex.  These findings as well as other foci are better visualized on recent preceding MRI.  No hemorrhage. 2.  Post right frontal craniotomy.  Post vascular clip in the anterior cerebral artery distribution.    Dictated by (CST): Fermín Castro MD on 1/28/2025 at 5:23 PM     Finalized by (CST): Fermín Castro MD on 1/28/2025 at 5:26 PM          CT BRAIN OR HEAD (CPT=70450)    Result Date: 1/25/2025  CONCLUSION:  1. No intracranial hemorrhage. 2. Acute left parietal occipital cortical infarct and posterior temporal cortical infarct.  Other acute  infarcts seen on the recent MRI are not detectable with certainty. 3. Right frontal craniotomy and para falcine aneurysm clipping.  New line large vessel intracranial atherosclerosis.    Dictated by (CST): Michael Gonzalez MD on 1/25/2025 at 7:43 PM     Finalized by (CST): Michael Gonzalez MD on 1/25/2025 at 7:48 PM          MRI BRAIN (CPT=70551)    Result Date: 1/25/2025  CONCLUSION:  1. Interval development of multiple acute infarcts with largest in the left parietal occipital region and left posterior temporal lobe..  Other smaller infarcts in the bilateral posterior frontal lobes, right paramedian posterior parietal lobe, right occipital lobe, left anterior frontal lobe. 2. Recent left thalamic lacunar infarct is unchanged.  3. One of recent cerebellar lacunar infarcts is much less conspicuous and others no longer restrict diffusion.  Other chronic cerebellar lacunar infarcts. 4. Post right frontal craniotomy for aneurysm clipping.    Dictated by (CST): Michael Gonzalez MD on 1/25/2025 at 6:22 PM     Finalized by (CST): Michael Gonzalez MD on 1/25/2025 at 6:31 PM          CTA BRAIN + CTA CAROTIDS (CPT=70496/86215)    Result Date: 1/25/2025  CONCLUSION:  1. No large vessel occlusion, hemodynamically significant stenosis, dissection, AVM or aneurysm. 2. Wall 3. Right frontal craniotomy for aneurysm clipping. 4. Patent proximal left subclavian artery stent. 5. Changes of chronic small vessel disease in both cerebral hemispheres. 6. Irregular atherosclerotic plaque in the thoracic aorta. 7. Advanced emphysema with pleural parenchymal scarring.     Dictated by (CST): Michael Gonzalez MD on 1/25/2025 at 1:31 PM     Finalized by (CST): Michael Gonzalez MD on 1/25/2025 at 1:49 PM          CT BRAIN OR HEAD (CPT=70450)    Result Date: 1/24/2025  CONCLUSION:  1. Subarachnoid hemorrhage in the left frontal high convexity in bilateral occipital lobes with mild cerebral edema. Continued surveillance is recommended.  2. Postoperative  changes of frontal craniotomy and anterior parafalcine aneurysm clipping.  3. Senescent changes of parenchymal volume loss with sequela of chronic microvascular ischemic disease.  4. There is large vessel atherosclerosis involving the anterior and posterior intracranial circulations.  5. Lesser incidental findings as above.      Results of this examination were conveyed to the patient's physician, Dr. Ronald March, by Dr. Gruber at 1838 on 01/24/2025.   Dictated by (CST): Sharath Gruber MD on 1/24/2025 at 6:35 PM     Finalized by (CST): Sharath Gruber MD on 1/24/2025 at 6:41 PM          CT BRAIN OR HEAD (CPT=70450)    Result Date: 1/24/2025  CONCLUSION:  1. Retained contrast material throughout the large intracranial vessels and cerebral sulci from same date cardiac angiogram.  Please note that this finding limits evaluation for detection of potential acute intracranial hemorrhage.  Aforementioned sulcal  enhancement most likely relates to a contrast staining.  However, if symptoms or strong clinical suspicion for acute intracranial hemorrhage persist, a short interval 6-12 hour follow-up head CT is recommended. 2. Subcentimeter enhancing foci in the left cerebellum (x2) likely relate to known enhancing subacute lacunar type infarcts.  Continued follow-up to ensure resolution of enhancement is advised. 3. Midline frontal craniotomy and anterior parafalcine aneurysm clipping. 4. Intracranial atherosclerosis. 5. Stable chronic moderate adenoid enlargement.  elm-remote  Dictated by (CST): Jose Enrique Valente MD on 1/24/2025 at 11:33 AM     Finalized by (CST): Jose Enrique Valente MD on 1/24/2025 at 11:40 AM          MRI BRAIN (CPT=70551)    Result Date: 1/20/2025  CONCLUSION:  1. Interval development of a punctate lacunar infarct of the left thalamus, concerning for acute ischemia.  2. Subacute lacunar infarcts of the left cerebellar hemisphere.   3. Postoperative changes of frontal craniotomy and parafalcine aneurysm clipping.   4. Senescent changes of parenchymal volume loss with mild sequela of chronic microvascular ischemic disease.  5. Lesser incidental findings as above.      A preliminary report was issued by the [facility] teleradiology service. There are no major discrepancies.  elm-remote.   Dictated by (CST): Sharath Gruber MD on 1/20/2025 at 9:24 AM     Finalized by (CST): Sharath Gruber MD on 1/20/2025 at 9:31 AM          CT STROKE BRAIN (NO IV)(CPT=70450)    Result Date: 1/19/2025  CONCLUSION:  1. Small focus of hypoattenuation involving the left cerebellar hemisphere, which corresponds to the acute infarct demonstrated on the prior MRI brain dated 01/17/2025. 2. Otherwise, no transcortical area of hypoattenuation to suggest an acute infarct.  If there is clinical concern for an acute infarct, consider further evaluation with an MRI of the brain. 3. No acute intracranial hemorrhage, midline shift, mass effect, or hydrocephalus. 4. Redemonstrated postoperative changes from prior frontal craniotomy for aneurysm clipping along the course of the anterior cerebral arteries.  The associated metallic artifact from the aneurysm clips limits evaluation of the adjacent structures. 5. Lesser incidental findings described above.    Impression points 1-3 were communicated via telephone to Leonidas SOW at 9:39 a.m. on 01/19/2025 by Joe Deleon MD  Dictated by (CST): Joe Deleon MD on 1/19/2025 at 9:34 AM     Finalized by (CST): Joe Deleon MD on 1/19/2025 at 9:41 AM          CT STROKE CTA BRAIN/CTA NECK (W IV)(CPT=70496/46322)    Result Date: 1/17/2025  CONCLUSION:  1. Negative for hemodynamically significant stenosis or occlusion of the anterior or posterior circulations.   2. The internal carotid arteries demonstrate tortuosity proximally without evidence of hemodynamically stenosis or occlusion.  3. The vertebral arteries demonstrate contiguous patency bilaterally without evidence of flow-limiting stenosis.  4.  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 Thrombus extends into the proximal left subclavian artery origin, contributing to luminal narrowing.  5. Dilatation of the main pulmonary artery trunk may relate to underlying pulmonary hypertension.  6. 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   7. Lesser incidental findings as above.   Dictated by (CST): Sharath Gruber MD on 1/17/2025 at 7:27 PM     Finalized by (CST): Sharath Gruber MD on 1/17/2025 at 7:36 PM          CT STROKE BRAIN (NO IV)(CPT=70450)    Result Date: 1/17/2025  CONCLUSION:  1. No acute intracranial hemorrhage or evidence of extended large vessel infarction. If there is clinical concern for acute ischemia, follow-up MRI suggested.   2. Postoperative changes of frontal craniotomy with anterior parafalcine aneurysm clipping.  3. Senescent changes of parenchymal volume loss with sequela of chronic microvascular ischemic disease.  4. There is large vessel atherosclerosis of the anterior and posterior intracranial circulations.  5. Lesser incidental findings as above.    This report was called immediately at 1915 hours to Emergency Department Pod 3 and discussed with the patient's ER physician, Dr. Cottrell.    Dictated by (CST): Sharath Gruber MD on 1/17/2025 at 7:11 PM     Finalized by (CST): Sharath Gruber MD on 1/17/2025 at 7:15 PM           Performed an independent visualization of head CT, CTA head and neck, MRI brain from 1/25/2025.  Also reviewed recent imaging throughout the week.  Reviewed multiple head CTs and CTAs as well as prior MRIs.  Imaging revealed: Agree with radiology read.    Education/Instructions given to: patient, daughter, granddaughter, daughter's   Barriers to Learning:None  Content: Refer to note above. Evaluation/Outcome: Verbalized understanding    Disclaimer:   This record was dictated using Dragon software. There may be errors due to voice recognition problems that were not  realized and corrected during the completion of the note.      This document is not intended to support charting by exception.  Sections left blank in a completed note should be presumed not to have been done.      Upon my evaluation, this patient had a high probability of imminent or life-threatening deterioration due to critical findings of laboratory tests, critical findings of imaging, central nervous system failure, acute CVA/stroke , and bleeding diatheses, which required my direct attention, intervention, and personal management.    I have personally provided 35 minutes of critical care time, exclusive of time spent on separately billable procedures.  Time includes review of all pertinent laboratory/radiology results, discussion with consultants, and monitoring for potential decompensation.  Performed interventions included fluids and use of antithrombotics in the setting of multiple GI AVMs with risk of bleeding, discussion of goals of care including treatments that need to be monitored for toxicity including heparin drip with risk of bleeding from GI AVMs and recurrent embolic strokes due to aortic thrombus .        Thank you.  Joe Kang D.O.   Vascular & General Neurology    01/28/25

## 2025-01-28 NOTE — PROGRESS NOTES
[facility]  part of Merged with [facility]    Progress Note    Chester Bautista Patient Status:  Inpatient    1940 MRN [id number]   Location [facility] 3W/SW Attending Richard Guerra MD   Hosp Day # 10 PCP Heike Sorto MD       Subjective:   Resting comfortably, NAD.  S/p stent placement on 2025.  Family present at the bedside, all questions and concerns addressed.  In good spirits this morning.   Port exchange in a.m.    Objective:   Blood pressure 143/74, pulse 93, temperature 96.7 °F (35.9 °C), temperature source Temporal, resp. rate 21, height 5' 1\" (1.549 m), weight 135 lb 9.6 oz (61.5 kg), SpO2 92%.    General: AAF, NAD, ill appearing but improved  Respiratory: Clear to auscultation bilaterally. No wheezes. No rhonchi.  Cardiovascular: RRR  Abdomen: Soft, nontender, nondistended.  Neurologic: No focal neurological deficits.   Musculoskeletal: Moves all extremities.  Extremities: No edema or cyanosis.    Current Inpatient Medications:     Current Facility-Administered Medications:     potassium chloride (Klor-Con M20) tab 40 mEq, 40 mEq, Oral, Q4H    heparin (Porcine) 15907 units/250mL infusion STROKE/NEURO CONTINUOUS, 200-3,000 Units/hr, Intravenous, Continuous    aspirin DR tab 81 mg, 81 mg, Oral, Daily    glucose (Dex4) 15 GM/59ML oral liquid 15 g, 15 g, Oral, Q15 Min PRN **OR** glucose (Glutose) 40% oral gel 15 g, 15 g, Oral, Q15 Min PRN **OR** glucose-vitamin C (Dex-4) chewable tab 4 tablet, 4 tablet, Oral, Q15 Min PRN **OR** dextrose 50% injection 50 mL, 50 mL, Intravenous, Q15 Min PRN **OR** glucose (Dex4) 15 GM/59ML oral liquid 30 g, 30 g, Oral, Q15 Min PRN **OR** glucose (Glutose) 40% oral gel 30 g, 30 g, Oral, Q15 Min PRN **OR** glucose-vitamin C (Dex-4) chewable tab 8 tablet, 8 tablet, Oral, Q15 Min PRN    lidocaine-menthol 4-1 % patch 1 patch, 1 patch, Transdermal, Daily    labetalol (Trandate) 5 mg/mL injection 10 mg, 10 mg, Intravenous, Q4H PRN     midazolam (Versed) 2 MG/2ML injection 2 mg, 2 mg, Intravenous, Once    octreoTIDe (SandoSTATIN) 50 mcg/mL injection 50 mcg, 50 mcg, Subcutaneous, Q8H    HYDROmorphone (Dilaudid) 1 MG/ML injection 0.2 mg, 0.2 mg, Intravenous, Q4H PRN    pantoprazole (Protonix) 40 mg in sodium chloride 0.9% PF 10 mL IV push, 40 mg, Intravenous, Q12H    sodium chloride 0.9% infusion, , Intravenous, Continuous    rosuvastatin (Crestor) tab 10 mg, 10 mg, Oral, Nightly    sennosides (Senokot) tab 8.6 mg, 8.6 mg, Oral, BID    ceFEPIme (Maxipime) 1 g in sodium chloride 0.9% 100 mL IVPB-MBP, 1 g, Intravenous, Q24H    cetirizine (ZyrTEC) tab 5 mg, 5 mg, Oral, Daily    acetaminophen (Tylenol Extra Strength) tab 500 mg, 500 mg, Oral, Q6H PRN    docusate sodium (Colace) cap 100 mg, 100 mg, Oral, BID    polyethylene glycol (PEG 3350) (Miralax) 17 g oral packet 17 g, 17 g, Oral, Daily PRN    magnesium hydroxide (Milk of Magnesia) 400 MG/5ML oral suspension 30 mL, 30 mL, Oral, Daily PRN    bisacodyl (Dulcolax) 10 MG rectal suppository 10 mg, 10 mg, Rectal, Daily PRN    fleet enema (Fleet) rectal enema 133 mL, 1 enema, Rectal, Once PRN    meclizine (Antivert) tab 12.5 mg, 12.5 mg, Oral, TID PRN    acetaminophen (Tylenol) tab 650 mg, 650 mg, Oral, Q4H PRN **OR** acetaminophen (Tylenol) rectal suppository 650 mg, 650 mg, Rectal, Q4H PRN    hydrALAzine (Apresoline) 20 mg/mL injection 10 mg, 10 mg, Intravenous, Q2H PRN    ondansetron (Zofran) 4 MG/2ML injection 4 mg, 4 mg, Intravenous, Q6H PRN    metoclopramide (Reglan) 5 mg/mL injection 5 mg, 5 mg, Intravenous, Q8H PRN    acetaminophen (Tylenol) tab 650 mg, 650 mg, Oral, Q6H PRN    ondansetron (Zofran) 4 MG/2ML injection 4 mg, 4 mg, Intravenous, Q6H PRN    albuterol (Ventolin) (2.5 MG/3ML) 0.083% nebulizer solution 2.5 mg, 2.5 mg, Nebulization, Q4H PRN    carvedilol (Coreg) tab 12.5 mg, 12.5 mg, Oral, BID with meals    cyclobenzaprine (Flexeril) tab 10 mg, 10 mg, Oral, Nightly PRN     fluticasone-salmeterol (Advair Diskus) 250-50 MCG/ACT inhaler 1 puff, 1 puff, Inhalation, 2 times per day    gabapentin (Neurontin) cap 100 mg, 100 mg, Oral, Nightly    montelukast (Singulair) tab 10 mg, 10 mg, Oral, Nightly    predniSONE (Deltasone) tab 5 mg, 5 mg, Oral, Daily    losartan (Cozaar) tab 50 mg, 50 mg, Oral, Daily    hydroxychloroquine (Plaquenil) tab 300 mg, 300 mg, Oral, Daily    oxyCODONE immediate release tab 5 mg, 5 mg, Oral, Q6H PRN    insulin aspart (NovoLOG) 100 Units/mL FlexPen 1-11 Units, 1-11 Units, Subcutaneous, TID CC    insulin degludec (Tresiba) 100 units/mL flextouch 10 Units, 10 Units, Subcutaneous, Nightly      Results:     Recent Labs   Lab 01/25/25  1604 01/25/25  2357 01/27/25  0514 01/27/25  1009 01/28/25  0536   RBC 3.03*  --  3.00*  --  2.96*   HGB 7.7*   < > 7.4* 7.8* 7.4*   HCT 24.4*   < > 24.3* 25.0* 23.4*   MCV 80.5  --  81.0  --  79.1*   MCH 25.4*  --  24.7*  --  25.0*   MCHC 31.6  --  30.5*  --  31.6   RDW 16.8*  --  16.8*  --  17.2*   NEPRELIM 12.54*  --  13.37*  --  9.39*   WBC 14.5*  --  17.2*  --  13.3*   .0  --  340.0  --  320.0    < > = values in this interval not displayed.         Recent Labs   Lab 01/26/25  0518 01/26/25  1730 01/27/25  0514 01/27/25  2254 01/28/25  0559   GLU 65*  --  252*  --  151*   BUN 11  --  8*  --  8*   CREATSERUM 0.88  --  0.89  --  0.95   EGFRCR 64  --  63  --  59*   CA 9.8  --  9.1  --  9.5     --  143  --  143   K 2.9*   < > 3.5  3.5 4.0 3.6     --  111  --  111   CO2 22.0  --  23.0  --  23.0    < > = values in this interval not displayed.         Imaging:   XR CHEST AP PORTABLE  (CPT=71045)    Result Date: 1/28/2025  CONCLUSION:  1. Cardiomegaly tortuous thoracic aorta 2. Bilateral mixed alveolar and interstitial multifocal airspace opacification with interval progression. 3. Bilateral central venous catheters with the tip in the distal SVC near the atrial junction.  No pneumothorax.  Left-sided Mediastinal vascular  stent in place.     Dictated by (CST): Louis Moreno MD on 1/28/2025 at 8:25 AM     Finalized by (CST): Louis Moreno MD on 1/28/2025 at 8:29 AM          US VENOUS DOPPLER LEG BILAT - DIAG IMG (CPT=93970)    Result Date: 1/27/2025  CONCLUSION: No evidence of left or right lower extremity DVT.   Dictated by (CST): Fermín Castro MD on 1/27/2025 at 9:51 PM     Finalized by (CST): Fermín Castro MD on 1/27/2025 at 9:53 PM          XR PORT VENOUS W FLUORO INJ (SND=02613)    Result Date: 1/27/2025  CONCLUSION: Fibrin sheath around the distal 5 cm segment of the Port-A-Cath resulting in inability to aspirate.  Please note, however, that the Port-A-Cath is in good position and can be used for injection as contrast/fluid is able to pass around the fibrin sheath into the right atrium.    Dictated by (CST): Parker Keane MD on 1/27/2025 at 1:52 PM     Finalized by (CST): Parker Keane MD on 1/27/2025 at 1:55 PM                 Assessment and Plan:   #Left Cerebellar Infarcts  #SAH  - pt with new sudden onset neuro changes  - 2 small cerebellar infarcts seen on admission MRI  -Patient already took 3 baby aspirin's prior to coming to ED   - CTA negative for LVO, but showing small subsegmental Pes  - Stroke alert called 1/19 -> stroke CT non-acute, MRI showing small CVA in L thalamus   - f/u lipid panel, A1c, TSH, ECHO   - Neuro on consult  -Continue statin  -Brillinta resumed 01/26/2025  -high risk morbidity/mortality given her history of GI bleed and now need for antiplatelet therapy and potential anticoagulation in the future. Family is aware  -gradually lower BP  -Vascular surgery on consult  -s/p stent placement 10/24/2025  -Brilinta x6 months  -CT head reviewed, repeat CT pending  -Appreciate further neuro and vascular surgery input  -PT/OT  -continue present management    #Dizziness  Improved   -Likely in setting of above  -Meclizine prn   -Neurology consulted   -continue brilinta and statin    #Anemia  -in  the setting of PE and aortic arch thrombus, the decision was made to resume anticoagulation despite the patients history of bleeding AVMs  -Hb appears to be stable at this time 8.4 -> 7.8 -> 7.7  -Transfused 1U pRBC, 1U platelets  -monitor H&H, transfuse as indicated  -GI on consult   Start octreotide TID  Continue Brilinta as the benefit of AC are >> risks of bleeding  Possible endoscopic eval vs transfer to a tertiary care center for balloon enteroscopy and colonoscopy  Currently on a heparin drip     #PE   -Patient is hemodynamically stable, EKG NSR and troponin not elevated.  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 Already took 3 baby aspirins prior to admission  -Previously had IVC filter placed in March 2024 for PE at that time (removed June 2024).   -Needs eval for hypercoagulable state. Risk factors of recent car ride, overall sedentary state, breast cancer. Sees Dr. Cade Chu in clinic for anemia, needs further follow up   -given current presentation the patient was restarted on AC, close Hb monitoring  -heparin drip at this time, change to PO at the time of discharge     #Descending aortic arch thrombus   -Admission CTA chest revealing mural thrombus throughout the thoracic and abdominal aorta, chronic per radiology  -Vascular surgery on consult  -planning for angiogram 01/24/2025  -cont brilinta and statin for now      #HTN  -Hold home antihypertensives to allow for permissive hypertension poststroke     #Klebsiella UTI  -nitrofurantoin initiated in ED, UCx reviewed - nitrofurantoin resistant  -started on IV Cefepime - day 6  -deescalate abx as indicated    #Constipation  -bowel regimen ordered     Chronic Medical Problems  HTN  HLD  DM  COPD/?sarcoidosis   Rheumatoid Arthritis  DVT/PE s/p IVC which has been removed   CKD3  Anemia - in setting of bleeding AVMs; getting iron infusions   Hx of Breast Cancer     DVT Ppx: SCDs  Full code  MDM:  High

## 2025-01-28 NOTE — SLP NOTE
ADULT SWALLOWING EVALUATION     ASSESSMENT    ASSESSMENT/OVERALL IMPRESSION:  PPE REQUIRED. THIS THERAPIST WORE GLOVES, DROPLET MASK, AND GOGGLES FOR DURATION OF EVALUATION. HANDS WASHED UPON ENTRANCE/EXIT.    A repeat swallow evaluation warranted due to new strokes. Pt remains on recommended diet of regular/thin per initial evaluation and RN reports pt tolerating with no CSA. Pt afebrile with clear vocal quality, on room air, with oxygen saturation at 96. Pt positioned upright in bed with no complaints of pain. Pt with adequate oral acceptance and bilabial seal across all trials. Pt with intact bite, mastication of solids, and timely A-P transit. Pt's swallow response appears timely with adequate hyolaryngeal elevation/excursion. No clinical signs of aspiration (e.g., immediate/delayed throat clear, immediate/delayed cough, wet vocal quality, increased O2 effort) observed across all trials. Oxygen status remained >95 t/o the entire evaluation.     At this time, pt presents with adequate oral and pharyngeal phase for safe oral intake. Recommend a regular diet and thin liquids with strict adherence to safe swallowing compensatory strategies. Results and recommendations reviewed with RN and pt. Pt v/u to all results/recommendations. Recommendations remain written on whiteboard.     PLAN: SLP to sign off on dysphagia tx at this time secondary to pt tolerating least restrictive diet with no CSA.     RECOMMENDATIONS   Diet Recommendations - Solids: Regular  Diet Recommendations - Liquids: Thin Liquids      Compensatory Strategies Recommended:  (reviewed standard swallowing precautions)     Medication Administration Recommendations: No restrictions  Treatment Plan/Recommendations: Cognitive communication therapy;Aspiration precautions    HISTORY   MEDICAL HISTORY  Reason for Referral: Stroke protocol    Problem List  Principal Problem:    Cerebellar stroke (HCC)  Active Problems:    AVM (arteriovenous malformation) of  small bowel, acquired with hemorrhage    Dizziness    Right hand paresthesia    Thrombus of aorta (HCC)    Bilateral pulmonary embolism (HCC)    Acute cystitis without hematuria    Small bowel bleed not requiring more than 4 units of blood in 24 hours, ICU, or surgery    Acute blood loss anemia    Cerebrovascular accident (CVA) due to embolism of precerebral artery (HCC)    Melena      Past Medical History  Past Medical History:    Anxiety state    Cancer (HCC)    breast cancer 2018    Cerebellar stroke (HCC)    Chronic obstructive pulmonary disease, unspecified (HCC)    Chronic obstructive pulmonary disease, unspecified (HCC)    COPD (chronic obstructive pulmonary disease) (HCC)    Cystic thyroid nodule    Diabetes (HCC)    Diabetes mellitus (HCC)    Essential hypertension    Exposure to medical diagnostic radiation    High blood pressure    High cholesterol    History of blood transfusion    low hemoglobin    Osteoarthritis    PONV (postoperative nausea and vomiting)    Pulmonary embolism (HCC)    Pulmonary emphysema (HCC)    Rheumatoid arthritis (HCC)       Prior Living Situation:  (Home with dtr)  Diet Prior to Admission: Regular;Thin liquids  Precautions: Aspiration    Patient/Family Goals: No new difficulties swallowing    SWALLOWING HISTORY  Current Diet Consistency: Regular;Thin liquids  Dysphagia History: None prior to admission        OBJECTIVE   ORAL MOTOR EXAMINATION  Dentition: Natural;Functional  Symmetry: Within Functional Limits  Strength: Within Functional Limits     Range of Motion: Within Functional Limits  Rate of Motion: Within Functional Limits    Voice Quality: Clear  Respiratory Status: Unlabored  Consistencies Trialed: Thin liquids;Hard solid  Method of Presentation: Self presentation;Cup;Straw;Single sips  Patient Positioned: Upright;Midline    Oral Phase of Swallow: Within Functional Limits       Pharyngeal Phase of Swallow: Within Functional Limits           (Please note: Silent  aspiration cannot be evaluated clinically. Videofluoroscopic Swallow Study is required to rule-out silent aspiration.)    Esophageal Phase of Swallow: No complaints consistent with possible esophageal involvement    FOLLOW UP  Treatment Plan/Recommendations: Cognitive communication therapy;Aspiration precautions  Duration: 1 week  Follow Up Needed (Documentation Required): Yes  SLP Follow-up Date: 01/29/25    Thank you for your referral.   If you have any questions, please contact MICHELLE Wagner M.S. CCC-SLP  Speech Language Pathologist  Phone Number [phone]

## 2025-01-28 NOTE — PLAN OF CARE
Problem: HEMATOLOGIC - ADULT  Goal: Free from bleeding injury  Description: (Example usage: patient with low platelets)  INTERVENTIONS:  - Avoid intramuscular injections, enemas and rectal medication administration  - Ensure safe mobilization of patient  - Hold pressure on venipuncture sites to achieve adequate hemostasis  - Assess for signs and symptoms of internal bleeding  - Monitor lab trends  - Patient is to report abnormal signs of bleeding to staff  - Avoid use of toothpicks and dental floss  - Use electric shaver for shaving  - Use soft bristle tooth brush  - Limit straining and forceful nose blowing  Outcome: Progressing     Problem: Patient Centered Care  Goal: Patient preferences are identified and integrated in the patient's plan of care  Description: Interventions:  - What would you like us to know as we care for you? From home with daughter   - Provide timely, complete, and accurate information to patient/family  - Incorporate patient and family knowledge, values, beliefs, and cultural backgrounds into the planning and delivery of care  - Encourage patient/family to participate in care and decision-making at the level they choose  - Honor patient and family perspectives and choices  Outcome: Progressing     Problem: Diabetes/Glucose Control  Goal: Glucose maintained within prescribed range  Description: INTERVENTIONS:  - Monitor Blood Glucose as ordered  - Assess for signs and symptoms of hyperglycemia and hypoglycemia  - Administer ordered medications to maintain glucose within target range  - Assess barriers to adequate nutritional intake and initiate nutrition consult as needed  - Instruct patient on self management of diabetes  Outcome: Progressing     Problem: CARDIOVASCULAR - ADULT  Goal: Maintains optimal cardiac output and hemodynamic stability  Description: INTERVENTIONS:  - Monitor vital signs, rhythm, and trends  - Monitor for bleeding, hypotension and signs of decreased cardiac output  -  Evaluate effectiveness of vasoactive medications to optimize hemodynamic stability  - Monitor arterial and/or venous puncture sites for bleeding and/or hematoma  - Assess quality of pulses, skin color and temperature  - Assess for signs of decreased coronary artery perfusion - ex. Angina  - Evaluate fluid balance, assess for edema, trend weights  Outcome: Progressing  Goal: Absence of cardiac arrhythmias or at baseline  Description: INTERVENTIONS:  - Continuous cardiac monitoring, monitor vital signs, obtain 12 lead EKG if indicated  - Evaluate effectiveness of antiarrhythmic and heart rate control medications as ordered  - Initiate emergency measures for life threatening arrhythmias  - Monitor electrolytes and administer replacement therapy as ordered  Outcome: Progressing     Problem: METABOLIC/FLUID AND ELECTROLYTES - ADULT  Goal: Glucose maintained within prescribed range  Description: INTERVENTIONS:  - Monitor Blood Glucose as ordered  - Assess for signs and symptoms of hyperglycemia and hypoglycemia  - Administer ordered medications to maintain glucose within target range  - Assess barriers to adequate nutritional intake and initiate nutrition consult as needed  - Instruct patient on self management of diabetes  Outcome: Progressing  Goal: Electrolytes maintained within normal limits  Description: INTERVENTIONS:  - Monitor labs and rhythm and assess patient for signs and symptoms of electrolyte imbalances  - Administer electrolyte replacement as ordered  - Monitor response to electrolyte replacements, including rhythm and repeat lab results as appropriate  - Fluid restriction as ordered  - Instruct patient on fluid and nutrition restrictions as appropriate  Outcome: Progressing  Goal: Hemodynamic stability and optimal renal function maintained  Description: INTERVENTIONS:  - Monitor labs and assess for signs and symptoms of volume excess or deficit  - Monitor intake, output and patient weight  - Monitor urine  specific gravity, serum osmolarity and serum sodium as indicated or ordered  - Monitor response to interventions for patient's volume status, including labs, urine output, blood pressure (other measures as available)  - Encourage oral intake as appropriate  - Instruct patient on fluid and nutrition restrictions as appropriate  Outcome: Progressing     Problem: NEUROLOGICAL - ADULT  Goal: Achieves stable or improved neurological status  Description: INTERVENTIONS  - Assess for and report changes in neurological status  - Initiate measures to prevent increased intracranial pressure  - Maintain blood pressure and fluid volume within ordered parameters to optimize cerebral perfusion and minimize risk of hemorrhage  - Monitor temperature, glucose, and sodium. Initiate appropriate interventions as ordered  Outcome: Progressing  Goal: Achieves maximal functionality and self care  Description: INTERVENTIONS  - Monitor swallowing and airway patency with patient fatigue and changes in neurological status  - Encourage and assist patient to increase activity and self care with guidance from PT/OT  - Encourage visually impaired, hearing impaired and aphasic patients to use assistive/communication devices  Outcome: Progressing     Problem: PAIN - ADULT  Goal: Verbalizes/displays adequate comfort level or patient's stated pain goal  Description: INTERVENTIONS:  - Encourage pt to monitor pain and request assistance  - Assess pain using appropriate pain scale  - Administer analgesics based on type and severity of pain and evaluate response  - Implement non-pharmacological measures as appropriate and evaluate response  - Consider cultural and social influences on pain and pain management  - Manage/alleviate anxiety  - Utilize distraction and/or relaxation techniques  - Monitor for opioid side effects  - Notify MD/LIP if interventions unsuccessful or patient reports new pain  - Anticipate increased pain with activity and pre-medicate  as appropriate  Outcome: Progressing     Problem: SAFETY ADULT - FALL  Goal: Free from fall injury  Description: INTERVENTIONS:  - Assess pt frequently for physical needs  - Identify cognitive and physical deficits and behaviors that affect risk of falls.  - Roe fall precautions as indicated by assessment.  - Educate pt/family on patient safety including physical limitations  - Instruct pt to call for assistance with activity based on assessment  - Modify environment to reduce risk of injury  - Provide assistive devices as appropriate  - Consider OT/PT consult to assist with strengthening/mobility  - Encourage toileting schedule  Outcome: Progressing     Problem: DISCHARGE PLANNING  Goal: Discharge to home or other facility with appropriate resources  Description: INTERVENTIONS:  - Identify barriers to discharge w/pt and caregiver  - Include patient/family/discharge partner in discharge planning  - Arrange for needed discharge resources and transportation as appropriate  - Identify discharge learning needs (meds, wound care, etc)  - Arrange for interpreters to assist at discharge as needed  - Consider post-discharge preferences of patient/family/discharge partner  - Complete POLST form as appropriate  - Assess patient's ability to be responsible for managing their own health  - Refer to Case Management Department for coordinating discharge planning if the patient needs post-hospital services based on physician/LIP order or complex needs related to functional status, cognitive ability or social support system  Outcome: Progressing     Problem: SKIN/TISSUE INTEGRITY - ADULT  Goal: Skin integrity remains intact  Description: INTERVENTIONS  - Assess and document risk factors for pressure ulcer development  - Assess and document skin integrity  - Monitor for areas of redness and/or skin breakdown  - Initiate interventions, skin care algorithm/standards of care as needed  Outcome: Progressing     Problem:  MUSCULOSKELETAL - ADULT  Goal: Return mobility to safest level of function  Description: INTERVENTIONS:  - Assess patient stability and activity tolerance for standing, transferring and ambulating w/ or w/o assistive devices  - Assist with transfers and ambulation using safe patient handling equipment as needed  - Ensure adequate protection for wounds/incisions during mobilization  - Obtain PT/OT consults as needed  - Advance activity as appropriate  - Communicate ordered activity level and limitations with patient/family  Outcome: Progressing     Problem: Impaired Swallowing  Goal: Minimize aspiration risk  Description: Interventions:  - Patient should be alert and upright for all feedings (90 degrees preferred)  - Offer food and liquids at a slow rate  - No straws  - Encourage small bites of food and small sips of liquid  - Offer pills one at a time, crush or deliver with applesauce as needed  - Discontinue feeding and notify MD (or speech pathologist) if coughing or persistent throat clearing or wet/gurgly vocal quality is noted  Outcome: Progressing

## 2025-01-28 NOTE — PROGRESS NOTES
Low dose heparin drip started for treatment of CVAs, PE  After discussion with intensivist, neuro and vascular surgery, decision made to change brilinta to daily baby ASA in setting of GIB with AVMs.     Ongoing HTN with SBP> 180. Goal SBP<180. Last neuro note state BP goal can lower once heparin drip started  -increase coreg to 25 mg bid, SBP <160 now that heparin infusing  -room to up titrate losartan if needed    Ivelisse Schumacher NP  [facility]

## 2025-01-28 NOTE — PROGRESS NOTES
[facility]     Gastroenterology Progress Note    Chester Bautista Patient Status:  Inpatient    1940 MRN [id number]   Location [facility] 3W/SW Attending Richard Guerra MD   Hosp Day # 10 PCP Heike Sorto MD       Subjective:   Resting in bed.  No abdominal pain reported or nausea or vomiting reported.  Bowel and bowel movements recorded.  Discussed with nursing.    Objective:   Blood pressure 143/74, pulse 93, temperature 96.7 °F (35.9 °C), temperature source Temporal, resp. rate 21, height 5' 1\" (1.549 m), weight 135 lb 9.6 oz (61.5 kg), SpO2 92%. Body mass index is 25.62 kg/m².    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    Admitted for acute infarcts, she is now on BRILINTA. Developed dark stools on  with drop in hgb after angiogram, stenting, procedural heparin. Received 1 unit prbc and hgb response sustained since then.      She is high risk for thrombotic events especially in light of recurrent strokes. At this point the benefits of anticoagulation >> risks of bleeding, if she warrants anticoagulation I would favor short acting agent to start and monitor for bleeding. If she incurs worsening anemia and more frequent transfusion requirements, would recommend tertiary care evaluation for balloon enteroscopy in hopes of ablating dominant AVM lesions.       Recommendations:  Continue octreotide 50 mcg sub-q TID while admitted  Brilinta to continue. Anticoagulation as above.   monitor hgb      Zoila Mcqueen MD  [facility] Gastroenterology          Results:     Lab Results   Component Value Date    WBC 13.3 (H) 01/28/2025    HGB 7.4 (L) 01/28/2025    HCT 23.4 (L) 01/28/2025    .0 01/28/2025    CREATSERUM 0.95 01/28/2025    BUN 8 (L) 01/28/2025     01/28/2025    K 3.6 01/28/2025     01/28/2025    CO2 23.0 01/28/2025     (H) 01/28/2025    CA 9.5 01/28/2025    ALB 3.3 01/28/2025    ALKPHO 80 01/28/2025    BILT 0.5 01/28/2025    TP 6.6 01/28/2025    AST 22 01/28/2025    ALT <7 (L) 01/28/2025    PTT 26.6 01/25/2025    INR 1.04 01/25/2025    T4F 1.2 01/17/2025    TSH 0.418 (L) 01/17/2025    DDIMER 2.77 (H) 01/25/2025    ESRML >130 (H) 11/29/2024    CRP 3.00 (H) 11/29/2024    MG 1.6 01/28/2025    PHOS 4.0 01/22/2025    B12 814 11/01/2024       XR CHEST AP PORTABLE  (CPT=71045)    Result Date: 1/28/2025  CONCLUSION:  1. Cardiomegaly tortuous thoracic aorta 2. Bilateral mixed alveolar and interstitial multifocal airspace opacification with interval progression. 3. Bilateral central venous catheters with the tip in the distal SVC near the atrial junction.  No pneumothorax.  Left-sided Mediastinal vascular stent in place.     Dictated by (CST): Louis Moreno MD on 1/28/2025 at 8:25 AM     Finalized by (CST): Louis Moreno MD on 1/28/2025 at 8:29 AM          US VENOUS DOPPLER LEG BILAT - DIAG IMG (CPT=93970)    Result Date: 1/27/2025  CONCLUSION: No evidence of left or right lower extremity DVT.   Dictated by (CST): Fermín Castro MD on 1/27/2025 at 9:51 PM     Finalized by (CST): Fermín Castro MD on 1/27/2025 at 9:53 PM          XR PORT VENOUS W FLUORO INJ (QTA=27819)    Result Date: 1/27/2025  CONCLUSION: Fibrin sheath around the distal 5 cm segment of the Port-A-Cath resulting in inability to aspirate.  Please note, however, that the  Port-A-Cath is in good position and can be used for injection as contrast/fluid is able to pass around the fibrin sheath into the right atrium.    Dictated by (CST): Parker Keane MD on 1/27/2025 at 1:52 PM     Finalized by (CST): Parker Keane MD on 1/27/2025 at 1:55 PM                 Endoscopy history:  3/9/2024 VCE video capsule exam attempted but unsuccessful due to capsule not leaving the stomach     2/28/2024 EGD examination with push enteroscopy Dr. Zoila Mcqueen:  Three nonbleeding AVMs ablated in the stomach  Over 6-7 \"small to medium sized\" nonbleeding AVMs seen but not cauterized in the duodenum  Plan initiated for octreotide injections     2/27/2024 EGD examination with push enteroscopy Dr. Zoila Mcqueen:  Two small AVM lesions in the stomach, 1 actively bleeding, both cauterized  Solid food in the stomach limited the exam that day; additional AVMs were suspected     12/21/2023 EGD examination with push enteroscopy Dr. Mikey Garcia:  Four tiny \" pinpoint\" AVM lesions cauterized in the duodenum, one actively bleeding     12/11/2023 VCE video capsule examination Dr. Mikey Garcia:  Small bowel passage time 4 hours 28 minutes with adequate prep  Multiple small bowel AVM lesions noted with 1 area of slow active bleeding     6/15/2023 EGD/colonoscopy examination Dr. Mikey Garcia:  Indication: Severe anemia  4mm gastric AVM lesion, cauterized  Three 2mm duodenal AVM lesions, cauterized  2mm cecal AVM lesion, cauterized  Pancolonic diverticulosis  Normal terminal ileum     7/21/2022 EGD/colonoscopy examinations Dr. Mikey Garcia:  Indication: Severe anemia  Two 2mm duodenal AVM lesions, cauterized  4mm cecal AVM lesion, cauterized  1 small 4mm colon polyp  Pancolonic diverticulosis  Normal terminal ileum

## 2025-01-28 NOTE — PLAN OF CARE
Neuro checks stable overnight. Pt safety maintained.   Problem: HEMATOLOGIC - ADULT  Goal: Free from bleeding injury  Description: (Example usage: patient with low platelets)  INTERVENTIONS:  - Avoid intramuscular injections, enemas and rectal medication administration  - Ensure safe mobilization of patient  - Hold pressure on venipuncture sites to achieve adequate hemostasis  - Assess for signs and symptoms of internal bleeding  - Monitor lab trends  - Patient is to report abnormal signs of bleeding to staff  - Avoid use of toothpicks and dental floss  - Use electric shaver for shaving  - Use soft bristle tooth brush  - Limit straining and forceful nose blowing  Outcome: Progressing     Problem: Patient Centered Care  Goal: Patient preferences are identified and integrated in the patient's plan of care  Description: Interventions:  - What would you like us to know as we care for you? From home with daughter   - Provide timely, complete, and accurate information to patient/family  - Incorporate patient and family knowledge, values, beliefs, and cultural backgrounds into the planning and delivery of care  - Encourage patient/family to participate in care and decision-making at the level they choose  - Honor patient and family perspectives and choices  Outcome: Progressing     Problem: Diabetes/Glucose Control  Goal: Glucose maintained within prescribed range  Description: INTERVENTIONS:  - Monitor Blood Glucose as ordered  - Assess for signs and symptoms of hyperglycemia and hypoglycemia  - Administer ordered medications to maintain glucose within target range  - Assess barriers to adequate nutritional intake and initiate nutrition consult as needed  - Instruct patient on self management of diabetes  Outcome: Progressing     Problem: Patient/Family Goals  Goal: Patient/Family Long Term Goal  Description: Patient's Long Term Goal: discharge from hospital     Interventions:  - monitor vital signs   - monitor blood  glucose levels  - monitor appropriate labs  - pain management   - administer medications per order  - follow MD orders  - diagnostics per order  - update and inform patient and family on plan of care  - discharge planning  - See additional Care Plan goals for specific interventions  Outcome: Progressing  Goal: Patient/Family Short Term Goal  Description: Patient's Short Term Goal: manage pain     Interventions:   - monitor vital signs   - monitor blood glucose levels  - monitor appropriate labs  - pain management   - administer medications per order  - follow MD orders  - diagnostics per order  - update and inform patient and family on plan of care  - See additional Care Plan goals for specific interventions  Outcome: Progressing     Problem: CARDIOVASCULAR - ADULT  Goal: Maintains optimal cardiac output and hemodynamic stability  Description: INTERVENTIONS:  - Monitor vital signs, rhythm, and trends  - Monitor for bleeding, hypotension and signs of decreased cardiac output  - Evaluate effectiveness of vasoactive medications to optimize hemodynamic stability  - Monitor arterial and/or venous puncture sites for bleeding and/or hematoma  - Assess quality of pulses, skin color and temperature  - Assess for signs of decreased coronary artery perfusion - ex. Angina  - Evaluate fluid balance, assess for edema, trend weights  Outcome: Progressing  Goal: Absence of cardiac arrhythmias or at baseline  Description: INTERVENTIONS:  - Continuous cardiac monitoring, monitor vital signs, obtain 12 lead EKG if indicated  - Evaluate effectiveness of antiarrhythmic and heart rate control medications as ordered  - Initiate emergency measures for life threatening arrhythmias  - Monitor electrolytes and administer replacement therapy as ordered  Outcome: Progressing     Problem: METABOLIC/FLUID AND ELECTROLYTES - ADULT  Goal: Glucose maintained within prescribed range  Description: INTERVENTIONS:  - Monitor Blood Glucose as ordered  -  Assess for signs and symptoms of hyperglycemia and hypoglycemia  - Administer ordered medications to maintain glucose within target range  - Assess barriers to adequate nutritional intake and initiate nutrition consult as needed  - Instruct patient on self management of diabetes  Outcome: Progressing  Goal: Electrolytes maintained within normal limits  Description: INTERVENTIONS:  - Monitor labs and rhythm and assess patient for signs and symptoms of electrolyte imbalances  - Administer electrolyte replacement as ordered  - Monitor response to electrolyte replacements, including rhythm and repeat lab results as appropriate  - Fluid restriction as ordered  - Instruct patient on fluid and nutrition restrictions as appropriate  Outcome: Progressing  Goal: Hemodynamic stability and optimal renal function maintained  Description: INTERVENTIONS:  - Monitor labs and assess for signs and symptoms of volume excess or deficit  - Monitor intake, output and patient weight  - Monitor urine specific gravity, serum osmolarity and serum sodium as indicated or ordered  - Monitor response to interventions for patient's volume status, including labs, urine output, blood pressure (other measures as available)  - Encourage oral intake as appropriate  - Instruct patient on fluid and nutrition restrictions as appropriate  Outcome: Progressing     Problem: NEUROLOGICAL - ADULT  Goal: Achieves stable or improved neurological status  Description: INTERVENTIONS  - Assess for and report changes in neurological status  - Initiate measures to prevent increased intracranial pressure  - Maintain blood pressure and fluid volume within ordered parameters to optimize cerebral perfusion and minimize risk of hemorrhage  - Monitor temperature, glucose, and sodium. Initiate appropriate interventions as ordered  Outcome: Progressing  Goal: Achieves maximal functionality and self care  Description: INTERVENTIONS  - Monitor swallowing and airway patency  with patient fatigue and changes in neurological status  - Encourage and assist patient to increase activity and self care with guidance from PT/OT  - Encourage visually impaired, hearing impaired and aphasic patients to use assistive/communication devices  Outcome: Progressing     Problem: PAIN - ADULT  Goal: Verbalizes/displays adequate comfort level or patient's stated pain goal  Description: INTERVENTIONS:  - Encourage pt to monitor pain and request assistance  - Assess pain using appropriate pain scale  - Administer analgesics based on type and severity of pain and evaluate response  - Implement non-pharmacological measures as appropriate and evaluate response  - Consider cultural and social influences on pain and pain management  - Manage/alleviate anxiety  - Utilize distraction and/or relaxation techniques  - Monitor for opioid side effects  - Notify MD/LIP if interventions unsuccessful or patient reports new pain  - Anticipate increased pain with activity and pre-medicate as appropriate  Outcome: Progressing     Problem: SAFETY ADULT - FALL  Goal: Free from fall injury  Description: INTERVENTIONS:  - Assess pt frequently for physical needs  - Identify cognitive and physical deficits and behaviors that affect risk of falls.  - [place] fall precautions as indicated by assessment.  - Educate pt/family on patient safety including physical limitations  - Instruct pt to call for assistance with activity based on assessment  - Modify environment to reduce risk of injury  - Provide assistive devices as appropriate  - Consider OT/PT consult to assist with strengthening/mobility  - Encourage toileting schedule  Outcome: Progressing     Problem: DISCHARGE PLANNING  Goal: Discharge to home or other facility with appropriate resources  Description: INTERVENTIONS:  - Identify barriers to discharge w/pt and caregiver  - Include patient/family/discharge partner in discharge planning  - Arrange for needed discharge  resources and transportation as appropriate  - Identify discharge learning needs (meds, wound care, etc)  - Arrange for interpreters to assist at discharge as needed  - Consider post-discharge preferences of patient/family/discharge partner  - Complete POLST form as appropriate  - Assess patient's ability to be responsible for managing their own health  - Refer to Case Management Department for coordinating discharge planning if the patient needs post-hospital services based on physician/LIP order or complex needs related to functional status, cognitive ability or social support system  Outcome: Progressing     Problem: SKIN/TISSUE INTEGRITY - ADULT  Goal: Skin integrity remains intact  Description: INTERVENTIONS  - Assess and document risk factors for pressure ulcer development  - Assess and document skin integrity  - Monitor for areas of redness and/or skin breakdown  - Initiate interventions, skin care algorithm/standards of care as needed  Outcome: Progressing     Problem: MUSCULOSKELETAL - ADULT  Goal: Return mobility to safest level of function  Description: INTERVENTIONS:  - Assess patient stability and activity tolerance for standing, transferring and ambulating w/ or w/o assistive devices  - Assist with transfers and ambulation using safe patient handling equipment as needed  - Ensure adequate protection for wounds/incisions during mobilization  - Obtain PT/OT consults as needed  - Advance activity as appropriate  - Communicate ordered activity level and limitations with patient/family  Outcome: Progressing     Problem: Impaired Swallowing  Goal: Minimize aspiration risk  Description: Interventions:  - Patient should be alert and upright for all feedings (90 degrees preferred)  - Offer food and liquids at a slow rate  - No straws  - Encourage small bites of food and small sips of liquid  - Offer pills one at a time, crush or deliver with applesauce as needed  - Discontinue feeding and notify MD (or speech  pathologist) if coughing or persistent throat clearing or wet/gurgly vocal quality is noted  Outcome: Progressing

## 2025-01-28 NOTE — PROGRESS NOTES
[facility]  part of [facility]    Progress Note      Assessment and Plan:   1.  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  As the risk of further thromboembolic event is high, low-dose heparin was initiated today.    Recommendations:  1.  Low-dose heparin drip and monitor hemoglobin  2.  As per neurology.  3.  Permissive hypertension    2.  GI bleed-has had multiple AVMs identified previously.  Hemoglobin now 7.4.    Recommendations: As per GI.  PPI.  Serial hemoglobin.  No further bleeding, [facility] would [facility] enteroscopy for ablation of AVMs.    3.  Bilateral PE-had IVC filter at 1 time now removed.  Lower extremity ultrasound now negative for clot.    Recommendations: Low-dose heparin for now.    4.  Left groin hematoma-conservative management    5.  Chronic respiratory failure-patient has combined pulmonary fibrosis with emphysema.    Recommendations: Advair and conservative management at present    6.  Klebsiella UTI-on cefepime    Subjective:   Chester Bautista is a(n) 84 year old female who is breathing comfortably and moving better.    Objective:   Blood pressure (!) 187/77, pulse 89, temperature 97.4 °F (36.3 °C), temperature source Temporal, resp. rate 20, height 5' 1\" (1.549 m), weight 135 lb 9.6 oz (61.5 kg), SpO2 99%.    Physical Exam alert white female  HEENT examination is unremarkable with pupils equal round and reactive to light and accommodation.   Neck without adenopathy, thyromegaly, JVD nor bruit.   Lungs clear to auscultation and percussion.  Cardiac regular rate and rhythm no murmur.   Abdomen nontender, without hepatosplenomegaly and no mass appreciable.   Extremities without clubbing cyanosis nor edema.   Neurologic grossly intact with symmetric tone and strength and reflex except for lingering right homonymous hemianopsia partial.  Skin without gross abnormality     Results:      Lab Results   Component Value Date    WBC 13.3 01/28/2025    HGB 7.4 01/28/2025    HCT 23.4 01/28/2025    .0 01/28/2025    CREATSERUM 0.95 01/28/2025    BUN 8 01/28/2025     01/28/2025    K 3.6 01/28/2025     01/28/2025    CO2 23.0 01/28/2025     01/28/2025    CA 9.5 01/28/2025    ALB 3.3 01/28/2025    ALKPHO 80 01/28/2025    BILT 0.5 01/28/2025    TP 6.6 01/28/2025    AST 22 01/28/2025    ALT <7 01/28/2025    PTT 58.8 01/28/2025    MG 1.6 01/28/2025       Chapin Parker MD  Medical Director, Critical Care, [facility]  Medical Director, [facility]  Pager: [phone]

## 2025-01-29 ENCOUNTER — APPOINTMENT (OUTPATIENT)
Dept: INTERVENTIONAL RADIOLOGY/VASCULAR | Facility: HOSPITAL | Age: 85
End: 2025-01-29
Attending: NURSE PRACTITIONER
Payer: MEDICARE

## 2025-01-29 ENCOUNTER — APPOINTMENT (OUTPATIENT)
Dept: GENERAL RADIOLOGY | Facility: HOSPITAL | Age: 85
End: 2025-01-29
Attending: INTERNAL MEDICINE
Payer: MEDICARE

## 2025-01-29 PROBLEM — R44.1 VISUAL HALLUCINATION: Status: ACTIVE | Noted: 2025-01-29

## 2025-01-29 LAB
ALBUMIN SERPL-MCNC: 3.2 G/DL (ref 3.2–4.8)
ALBUMIN/GLOB SERPL: 1 {RATIO} (ref 1–2)
ALP LIVER SERPL-CCNC: 77 U/L
ALT SERPL-CCNC: <7 U/L
ANION GAP SERPL CALC-SCNC: 9 MMOL/L (ref 0–18)
ANION GAP SERPL CALC-SCNC: 9 MMOL/L (ref 0–18)
APTT PPP: 145.1 SECONDS (ref 23–36)
APTT PPP: 33.1 SECONDS (ref 23–36)
AST SERPL-CCNC: 21 U/L (ref ?–34)
BASOPHILS # BLD AUTO: 0.06 X10(3) UL (ref 0–0.2)
BASOPHILS # BLD AUTO: 0.07 X10(3) UL (ref 0–0.2)
BASOPHILS NFR BLD AUTO: 0.5 %
BASOPHILS NFR BLD AUTO: 0.5 %
BILIRUB SERPL-MCNC: 0.4 MG/DL (ref 0.2–1.1)
BUN BLD-MCNC: 11 MG/DL (ref 9–23)
BUN BLD-MCNC: 11 MG/DL (ref 9–23)
BUN/CREAT SERPL: 10.7 (ref 10–20)
BUN/CREAT SERPL: 10.7 (ref 10–20)
CALCIUM BLD-MCNC: 9.5 MG/DL (ref 8.7–10.4)
CALCIUM BLD-MCNC: 9.5 MG/DL (ref 8.7–10.4)
CHLORIDE SERPL-SCNC: 110 MMOL/L (ref 98–112)
CHLORIDE SERPL-SCNC: 110 MMOL/L (ref 98–112)
CO2 SERPL-SCNC: 23 MMOL/L (ref 21–32)
CO2 SERPL-SCNC: 23 MMOL/L (ref 21–32)
CREAT BLD-MCNC: 1.03 MG/DL
CREAT BLD-MCNC: 1.03 MG/DL
DEPRECATED RDW RBC AUTO: 51.2 FL (ref 35.1–46.3)
DEPRECATED RDW RBC AUTO: 51.9 FL (ref 35.1–46.3)
DEPRECATED RDW RBC AUTO: 51.9 FL (ref 35.1–46.3)
EGFRCR SERPLBLD CKD-EPI 2021: 53 ML/MIN/1.73M2 (ref 60–?)
EGFRCR SERPLBLD CKD-EPI 2021: 53 ML/MIN/1.73M2 (ref 60–?)
EOSINOPHIL # BLD AUTO: 0.23 X10(3) UL (ref 0–0.7)
EOSINOPHIL # BLD AUTO: 0.53 X10(3) UL (ref 0–0.7)
EOSINOPHIL NFR BLD AUTO: 1.5 %
EOSINOPHIL NFR BLD AUTO: 4.3 %
ERYTHROCYTE [DISTWIDTH] IN BLOOD BY AUTOMATED COUNT: 17.4 % (ref 11–15)
ERYTHROCYTE [DISTWIDTH] IN BLOOD BY AUTOMATED COUNT: 17.4 % (ref 11–15)
ERYTHROCYTE [DISTWIDTH] IN BLOOD BY AUTOMATED COUNT: 17.6 % (ref 11–15)
GLOBULIN PLAS-MCNC: 3.3 G/DL (ref 2–3.5)
GLUCOSE BLD-MCNC: 155 MG/DL (ref 70–99)
GLUCOSE BLD-MCNC: 155 MG/DL (ref 70–99)
GLUCOSE BLDC GLUCOMTR-MCNC: 108 MG/DL (ref 70–99)
GLUCOSE BLDC GLUCOMTR-MCNC: 123 MG/DL (ref 70–99)
GLUCOSE BLDC GLUCOMTR-MCNC: 135 MG/DL (ref 70–99)
GLUCOSE BLDC GLUCOMTR-MCNC: 141 MG/DL (ref 70–99)
GLUCOSE BLDC GLUCOMTR-MCNC: 293 MG/DL (ref 70–99)
HCT VFR BLD AUTO: 22.8 %
HCT VFR BLD AUTO: 23.7 %
HCT VFR BLD AUTO: 23.7 %
HGB BLD-MCNC: 7.2 G/DL
HGB BLD-MCNC: 7.2 G/DL
HGB BLD-MCNC: 7.3 G/DL
IMM GRANULOCYTES # BLD AUTO: 0.09 X10(3) UL (ref 0–1)
IMM GRANULOCYTES # BLD AUTO: 0.1 X10(3) UL (ref 0–1)
IMM GRANULOCYTES NFR BLD: 0.7 %
IMM GRANULOCYTES NFR BLD: 0.7 %
LYMPHOCYTES # BLD AUTO: 1.42 X10(3) UL (ref 1–4)
LYMPHOCYTES # BLD AUTO: 2.25 X10(3) UL (ref 1–4)
LYMPHOCYTES NFR BLD AUTO: 18 %
LYMPHOCYTES NFR BLD AUTO: 9.4 %
MAGNESIUM SERPL-MCNC: 1.6 MG/DL (ref 1.6–2.6)
MCH RBC QN AUTO: 24.8 PG (ref 26–34)
MCH RBC QN AUTO: 24.8 PG (ref 26–34)
MCH RBC QN AUTO: 25.7 PG (ref 26–34)
MCHC RBC AUTO-ENTMCNC: 30.4 G/DL (ref 31–37)
MCHC RBC AUTO-ENTMCNC: 30.4 G/DL (ref 31–37)
MCHC RBC AUTO-ENTMCNC: 32 G/DL (ref 31–37)
MCV RBC AUTO: 80.3 FL
MCV RBC AUTO: 81.7 FL
MCV RBC AUTO: 81.7 FL
MONOCYTES # BLD AUTO: 0.9 X10(3) UL (ref 0.1–1)
MONOCYTES # BLD AUTO: 1 X10(3) UL (ref 0.1–1)
MONOCYTES NFR BLD AUTO: 6 %
MONOCYTES NFR BLD AUTO: 8 %
NEUTROPHILS # BLD AUTO: 12.34 X10 (3) UL (ref 1.5–7.7)
NEUTROPHILS # BLD AUTO: 12.34 X10(3) UL (ref 1.5–7.7)
NEUTROPHILS # BLD AUTO: 8.54 X10 (3) UL (ref 1.5–7.7)
NEUTROPHILS # BLD AUTO: 8.54 X10(3) UL (ref 1.5–7.7)
NEUTROPHILS NFR BLD AUTO: 68.5 %
NEUTROPHILS NFR BLD AUTO: 81.9 %
OSMOLALITY SERPL CALC.SUM OF ELEC: 297 MOSM/KG (ref 275–295)
OSMOLALITY SERPL CALC.SUM OF ELEC: 297 MOSM/KG (ref 275–295)
PLATELET # BLD AUTO: 307 10(3)UL (ref 150–450)
PLATELET # BLD AUTO: 311 10(3)UL (ref 150–450)
PLATELET # BLD AUTO: 311 10(3)UL (ref 150–450)
POTASSIUM SERPL-SCNC: 4 MMOL/L (ref 3.5–5.1)
POTASSIUM SERPL-SCNC: 4 MMOL/L (ref 3.5–5.1)
PROT SERPL-MCNC: 6.5 G/DL (ref 5.7–8.2)
RBC # BLD AUTO: 2.84 X10(6)UL
RBC # BLD AUTO: 2.9 X10(6)UL
RBC # BLD AUTO: 2.9 X10(6)UL
SODIUM SERPL-SCNC: 142 MMOL/L (ref 136–145)
SODIUM SERPL-SCNC: 142 MMOL/L (ref 136–145)
WBC # BLD AUTO: 12.5 X10(3) UL (ref 4–11)
WBC # BLD AUTO: 12.5 X10(3) UL (ref 4–11)
WBC # BLD AUTO: 15.1 X10(3) UL (ref 4–11)

## 2025-01-29 PROCEDURE — 99233 SBSQ HOSP IP/OBS HIGH 50: CPT | Performed by: INTERNAL MEDICINE

## 2025-01-29 PROCEDURE — 71045 X-RAY EXAM CHEST 1 VIEW: CPT | Performed by: INTERNAL MEDICINE

## 2025-01-29 PROCEDURE — 99232 SBSQ HOSP IP/OBS MODERATE 35: CPT | Performed by: STUDENT IN AN ORGANIZED HEALTH CARE EDUCATION/TRAINING PROGRAM

## 2025-01-29 PROCEDURE — 0JH63XZ INSERTION OF TUNNELED VASCULAR ACCESS DEVICE INTO CHEST SUBCUTANEOUS TISSUE AND FASCIA, PERCUTANEOUS APPROACH: ICD-10-PCS | Performed by: RADIOLOGY

## 2025-01-29 PROCEDURE — 05PY03Z REMOVAL OF INFUSION DEVICE FROM UPPER VEIN, OPEN APPROACH: ICD-10-PCS | Performed by: RADIOLOGY

## 2025-01-29 PROCEDURE — 05HM33Z INSERTION OF INFUSION DEVICE INTO RIGHT INTERNAL JUGULAR VEIN, PERCUTANEOUS APPROACH: ICD-10-PCS | Performed by: RADIOLOGY

## 2025-01-29 PROCEDURE — 99233 SBSQ HOSP IP/OBS HIGH 50: CPT | Performed by: OTHER

## 2025-01-29 PROCEDURE — B5131ZA FLUOROSCOPY OF RIGHT JUGULAR VEINS USING LOW OSMOLAR CONTRAST, GUIDANCE: ICD-10-PCS | Performed by: RADIOLOGY

## 2025-01-29 PROCEDURE — 0JPV0XZ REMOVAL OF TUNNELED VASCULAR ACCESS DEVICE FROM UPPER EXTREMITY SUBCUTANEOUS TISSUE AND FASCIA, OPEN APPROACH: ICD-10-PCS | Performed by: RADIOLOGY

## 2025-01-29 RX ORDER — DOCUSATE SODIUM 100 MG/1
100 CAPSULE, LIQUID FILLED ORAL 2 TIMES DAILY
Status: DISCONTINUED | OUTPATIENT
Start: 2025-01-29 | End: 2025-01-29

## 2025-01-29 RX ORDER — BISACODYL 10 MG
10 SUPPOSITORY, RECTAL RECTAL
Status: DISCONTINUED | OUTPATIENT
Start: 2025-01-29 | End: 2025-02-05

## 2025-01-29 RX ORDER — SENNOSIDES 8.6 MG
8.6 TABLET ORAL 2 TIMES DAILY
Status: DISCONTINUED | OUTPATIENT
Start: 2025-01-29 | End: 2025-01-29

## 2025-01-29 RX ORDER — SODIUM PHOSPHATE, DIBASIC AND SODIUM PHOSPHATE, MONOBASIC 7; 19 G/230ML; G/230ML
1 ENEMA RECTAL ONCE AS NEEDED
Status: DISCONTINUED | OUTPATIENT
Start: 2025-01-29 | End: 2025-02-05

## 2025-01-29 RX ORDER — MIDAZOLAM HYDROCHLORIDE 1 MG/ML
INJECTION INTRAMUSCULAR; INTRAVENOUS
Status: COMPLETED
Start: 2025-01-29 | End: 2025-01-29

## 2025-01-29 RX ORDER — LIDOCAINE HYDROCHLORIDE 20 MG/ML
INJECTION, SOLUTION INFILTRATION; PERINEURAL
Status: COMPLETED
Start: 2025-01-29 | End: 2025-01-29

## 2025-01-29 RX ORDER — MAGNESIUM SULFATE HEPTAHYDRATE 40 MG/ML
2 INJECTION, SOLUTION INTRAVENOUS ONCE
Status: COMPLETED | OUTPATIENT
Start: 2025-01-29 | End: 2025-01-29

## 2025-01-29 NOTE — BRIEF PROCEDURE NOTE
[facility]  part of [facility]  Procedure Note    Chester Bautista Patient Status:  Inpatient    1940 MRN [id number]   Location [facility] 2W/SW Attending Bonny Carranza MD   Hosp Day # 11 PCP Heike Sorto MD     Procedure: surgical removal of nonfunctioning port, placement of new chest port via R int jugular    Pre-Procedure Diagnosis:  nonfunctioning chest port due to fibrin sheath, limited vascular access    Post-Procedure Diagnosis: as above    Anesthesia:  Sedation    Findings:  Following unsuccessful attempt at transfemoral stripping of the fibrin sheath from the catheter, the chest port and occluded catheter were removed, and a new chest port placed via R int jug     Specimens: 0    Blood Loss:  minimal      Complications:  None    Bradley Boyle MD  2025

## 2025-01-29 NOTE — PROGRESS NOTES
01/29/25 0929   VISIT TYPE   SLP Inpatient Visit Type (Documentation Required) Attempted Treatment   FOLLOW UP/PLAN   Follow Up Needed (Documentation Required) Yes   SLP Follow-up Date 01/30/25     SLP attempt this AM. Pt to remain NPO for procedure this PM. SLP to follow up as pt cleared for PO and/or as schedule allows.    Thank you.    Siria Mccormick M.S. CCC-SLP  Speech Language Pathologist  Phone Number Ext. [phone]

## 2025-01-29 NOTE — PROGRESS NOTES
[facility]     Gastroenterology Progress Note    Chester Bautista Patient Status:  Inpatient    1940 MRN [id number]   Location [facility] 3W/SW Attending Richard Guerra MD   Hosp Day # 11 PCP Heike Sorto MD       Subjective:   Resting in bed.  No abdominal pain reported or nausea or vomiting reported.  Patient notes brown stool this morning.    Objective:   Blood pressure (!) 156/94, pulse 88, temperature 98.3 °F (36.8 °C), temperature source Temporal, resp. rate 19, height 5' 1\" (1.549 m), weight 135 lb 9.6 oz (61.5 kg), SpO2 94%. Body mass index is 25.62 kg/m².    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    Admitted for acute infarcts, she is now on BRILINTA. Developed dark stools on  with drop in hgb after angiogram, stenting, procedural heparin. Received 1 unit prbc and hgb response sustained since then.      She is high risk for thrombotic events especially in light of recurrent strokes. At this point the benefits of anticoagulation >> risks of bleeding.      She was started on low-dose heparin drip yesterday.  Hemoglobin remained stable, no signs of overt GI bleeding.  If she incurs worsening anemia and more frequent transfusion requirements, would recommend tertiary care evaluation for balloon enteroscopy in hopes of ablating dominant AVM lesions.       Recommendations:  Continue octreotide 50 mcg sub-q TID while admitted  Brilinta to continue.  Started on heparin drip  monitor hgb      Zoila Mcqueen MD  [facility]          Results:     Lab Results   Component Value Date    WBC 12.5 (H) 01/29/2025    WBC 12.5 (H) 01/29/2025    HGB 7.2 (L) 01/29/2025    HGB 7.2 (L) 01/29/2025    HCT 23.7 (L) 01/29/2025    HCT 23.7 (L) 01/29/2025    .0 01/29/2025    .0 01/29/2025    CREATSERUM 1.03 (H) 01/29/2025    CREATSERUM 1.03 (H) 01/29/2025    BUN 11 01/29/2025    BUN 11 01/29/2025     01/29/2025     01/29/2025    K 4.0 01/29/2025    K 4.0 01/29/2025     01/29/2025     01/29/2025    CO2 23.0 01/29/2025    CO2 23.0 01/29/2025     (H) 01/29/2025     (H) 01/29/2025    CA 9.5 01/29/2025    CA 9.5 01/29/2025    ALB 3.2 01/29/2025    ALKPHO 77 01/29/2025    BILT 0.4 01/29/2025    TP 6.5 01/29/2025    AST 21 01/29/2025    ALT <7 (L) 01/29/2025    .1 (H) 01/29/2025    INR 1.04 01/25/2025    T4F 1.2 01/17/2025    TSH 0.418 (L) 01/17/2025    DDIMER 2.77 (H) 01/25/2025    ESRML >130 (H) 11/29/2024    CRP 3.00 (H) 11/29/2024    MG 1.6 01/29/2025    PHOS 4.0 01/22/2025    B12 814 11/01/2024       XR CHEST AP PORTABLE  (CPT=02282)    Result Date: 1/29/2025  CONCLUSION:  1. Cardiomegaly.  Tortuous atherosclerotic aorta. 2. Extensive bilateral mixed alveolar and interstitial multifocal airspace opacification with interval progression in the upper lobes.    Dictated by (CST): Louis Moreno MD on 1/29/2025 at 9:03 AM     Finalized by (CST): Louis Moreno MD on 1/29/2025 at 9:06 AM          CT BRAIN OR HEAD (CPT=70450)    Result Date: 1/28/2025  CONCLUSION:  1.  Findings compatible with recent/subacute infarcts within the bilateral temporal lobes, left parietal-occipital lobe junction, and scattered throughout the frontal lobe near the vertex.  These findings as well as other foci are better visualized on  recent preceding MRI.  No hemorrhage. 2.  Post right frontal craniotomy.  Post vascular clip in the anterior cerebral artery distribution.    Dictated by (CST): Fermín Castro MD on 1/28/2025 at 5:23 PM     Finalized by (CST): Fermín Castro MD on 1/28/2025 at 5:26 PM          XR CHEST AP PORTABLE  (CPT=71045)    Result Date: 1/28/2025  CONCLUSION:  1. Cardiomegaly tortuous thoracic aorta 2. Bilateral mixed alveolar and interstitial multifocal airspace opacification with interval progression. 3. Bilateral central venous catheters with the tip in the distal SVC near the atrial junction.  No pneumothorax.  Left-sided Mediastinal vascular stent in place.     Dictated by (CST): Louis Moreno MD on 1/28/2025 at 8:25 AM     Finalized by (CST): Louis Moreno MD on 1/28/2025 at 8:29 AM          US VENOUS DOPPLER LEG BILAT - DIAG IMG (CPT=93970)    Result Date: 1/27/2025  CONCLUSION: No evidence of left or right lower extremity DVT.   Dictated by (CST): Fermín Castro MD on 1/27/2025 at 9:51 PM     Finalized by (CST): Fermín Castro MD on 1/27/2025 at 9:53 PM                 Endoscopy history:  3/9/2024 VCE video capsule exam attempted but unsuccessful due to capsule not leaving the stomach     2/28/2024 EGD examination with push enteroscopy Dr. Zoila Mcqueen:  Three nonbleeding AVMs ablated in the stomach  Over 6-7 \"small to medium sized\" nonbleeding AVMs seen but not cauterized in the duodenum  Plan initiated for octreotide injections     2/27/2024 EGD examination with push enteroscopy Dr. Zoila Mcqueen:  Two small AVM lesions in the stomach, 1 actively bleeding, both cauterized  Solid food in the stomach limited the exam that day; additional AVMs were suspected     12/21/2023 EGD examination with push enteroscopy Dr. Mikey Garcia:  Four tiny \" pinpoint\" AVM lesions cauterized in the duodenum, one actively bleeding     12/11/2023 VCE video capsule examination Dr. Mikey Garcia:  Small bowel passage time 4 hours 28 minutes with  adequate prep  Multiple small bowel AVM lesions noted with 1 area of slow active bleeding     6/15/2023 EGD/colonoscopy examination Dr. Mikey Garcia:  Indication: Severe anemia  4mm gastric AVM lesion, cauterized  Three 2mm duodenal AVM lesions, cauterized  2mm cecal AVM lesion, cauterized  Pancolonic diverticulosis  Normal terminal ileum     7/21/2022 EGD/colonoscopy examinations Dr. Mikey Garcia:  Indication: Severe anemia  Two 2mm duodenal AVM lesions, cauterized  4mm cecal AVM lesion, cauterized  1 small 4mm colon polyp  Pancolonic diverticulosis  Normal terminal ileum

## 2025-01-29 NOTE — PLAN OF CARE
Problem: HEMATOLOGIC - ADULT  Goal: Free from bleeding injury  Description: (Example usage: patient with low platelets)  INTERVENTIONS:  - Avoid intramuscular injections, enemas and rectal medication administration  - Ensure safe mobilization of patient  - Hold pressure on venipuncture sites to achieve adequate hemostasis  - Assess for signs and symptoms of internal bleeding  - Monitor lab trends  - Patient is to report abnormal signs of bleeding to staff  - Avoid use of toothpicks and dental floss  - Use electric shaver for shaving  - Use soft bristle tooth brush  - Limit straining and forceful nose blowing  Outcome: Progressing     Problem: Patient Centered Care  Goal: Patient preferences are identified and integrated in the patient's plan of care  Description: Interventions:  - What would you like us to know as we care for you? From home with daughter   - Provide timely, complete, and accurate information to patient/family  - Incorporate patient and family knowledge, values, beliefs, and cultural backgrounds into the planning and delivery of care  - Encourage patient/family to participate in care and decision-making at the level they choose  - Honor patient and family perspectives and choices  Outcome: Progressing     Problem: Diabetes/Glucose Control  Goal: Glucose maintained within prescribed range  Description: INTERVENTIONS:  - Monitor Blood Glucose as ordered  - Assess for signs and symptoms of hyperglycemia and hypoglycemia  - Administer ordered medications to maintain glucose within target range  - Assess barriers to adequate nutritional intake and initiate nutrition consult as needed  - Instruct patient on self management of diabetes  Outcome: Progressing     Problem: Patient/Family Goals  Goal: Patient/Family Long Term Goal  Description: Patient's Long Term Goal: discharge from hospital     Interventions:  - monitor vital signs   - monitor blood glucose levels  - monitor appropriate labs  - pain  management   - administer medications per order  - follow MD orders  - diagnostics per order  - update and inform patient and family on plan of care  - discharge planning  - See additional Care Plan goals for specific interventions  Outcome: Progressing  Goal: Patient/Family Short Term Goal  Description: Patient's Short Term Goal: manage pain     Interventions:   - monitor vital signs   - monitor blood glucose levels  - monitor appropriate labs  - pain management   - administer medications per order  - follow MD orders  - diagnostics per order  - update and inform patient and family on plan of care  - See additional Care Plan goals for specific interventions  Outcome: Progressing     Problem: CARDIOVASCULAR - ADULT  Goal: Maintains optimal cardiac output and hemodynamic stability  Description: INTERVENTIONS:  - Monitor vital signs, rhythm, and trends  - Monitor for bleeding, hypotension and signs of decreased cardiac output  - Evaluate effectiveness of vasoactive medications to optimize hemodynamic stability  - Monitor arterial and/or venous puncture sites for bleeding and/or hematoma  - Assess quality of pulses, skin color and temperature  - Assess for signs of decreased coronary artery perfusion - ex. Angina  - Evaluate fluid balance, assess for edema, trend weights  Outcome: Progressing  Goal: Absence of cardiac arrhythmias or at baseline  Description: INTERVENTIONS:  - Continuous cardiac monitoring, monitor vital signs, obtain 12 lead EKG if indicated  - Evaluate effectiveness of antiarrhythmic and heart rate control medications as ordered  - Initiate emergency measures for life threatening arrhythmias  - Monitor electrolytes and administer replacement therapy as ordered  Outcome: Progressing     Problem: METABOLIC/FLUID AND ELECTROLYTES - ADULT  Goal: Glucose maintained within prescribed range  Description: INTERVENTIONS:  - Monitor Blood Glucose as ordered  - Assess for signs and symptoms of hyperglycemia and  hypoglycemia  - Administer ordered medications to maintain glucose within target range  - Assess barriers to adequate nutritional intake and initiate nutrition consult as needed  - Instruct patient on self management of diabetes  Outcome: Progressing  Goal: Electrolytes maintained within normal limits  Description: INTERVENTIONS:  - Monitor labs and rhythm and assess patient for signs and symptoms of electrolyte imbalances  - Administer electrolyte replacement as ordered  - Monitor response to electrolyte replacements, including rhythm and repeat lab results as appropriate  - Fluid restriction as ordered  - Instruct patient on fluid and nutrition restrictions as appropriate  Outcome: Progressing  Goal: Hemodynamic stability and optimal renal function maintained  Description: INTERVENTIONS:  - Monitor labs and assess for signs and symptoms of volume excess or deficit  - Monitor intake, output and patient weight  - Monitor urine specific gravity, serum osmolarity and serum sodium as indicated or ordered  - Monitor response to interventions for patient's volume status, including labs, urine output, blood pressure (other measures as available)  - Encourage oral intake as appropriate  - Instruct patient on fluid and nutrition restrictions as appropriate  Outcome: Progressing     Problem: NEUROLOGICAL - ADULT  Goal: Achieves stable or improved neurological status  Description: INTERVENTIONS  - Assess for and report changes in neurological status  - Initiate measures to prevent increased intracranial pressure  - Maintain blood pressure and fluid volume within ordered parameters to optimize cerebral perfusion and minimize risk of hemorrhage  - Monitor temperature, glucose, and sodium. Initiate appropriate interventions as ordered  Outcome: Progressing  Goal: Achieves maximal functionality and self care  Description: INTERVENTIONS  - Monitor swallowing and airway patency with patient fatigue and changes in neurological  status  - Encourage and assist patient to increase activity and self care with guidance from PT/OT  - Encourage visually impaired, hearing impaired and aphasic patients to use assistive/communication devices  Outcome: Progressing     Problem: PAIN - ADULT  Goal: Verbalizes/displays adequate comfort level or patient's stated pain goal  Description: INTERVENTIONS:  - Encourage pt to monitor pain and request assistance  - Assess pain using appropriate pain scale  - Administer analgesics based on type and severity of pain and evaluate response  - Implement non-pharmacological measures as appropriate and evaluate response  - Consider cultural and social influences on pain and pain management  - Manage/alleviate anxiety  - Utilize distraction and/or relaxation techniques  - Monitor for opioid side effects  - Notify MD/LIP if interventions unsuccessful or patient reports new pain  - Anticipate increased pain with activity and pre-medicate as appropriate  Outcome: Progressing     Problem: SAFETY ADULT - FALL  Goal: Free from fall injury  Description: INTERVENTIONS:  - Assess pt frequently for physical needs  - Identify cognitive and physical deficits and behaviors that affect risk of falls.  - [place] fall precautions as indicated by assessment.  - Educate pt/family on patient safety including physical limitations  - Instruct pt to call for assistance with activity based on assessment  - Modify environment to reduce risk of injury  - Provide assistive devices as appropriate  - Consider OT/PT consult to assist with strengthening/mobility  - Encourage toileting schedule  Outcome: Progressing     Problem: DISCHARGE PLANNING  Goal: Discharge to home or other facility with appropriate resources  Description: INTERVENTIONS:  - Identify barriers to discharge w/pt and caregiver  - Include patient/family/discharge partner in discharge planning  - Arrange for needed discharge resources and transportation as appropriate  - Identify  discharge learning needs (meds, wound care, etc)  - Arrange for interpreters to assist at discharge as needed  - Consider post-discharge preferences of patient/family/discharge partner  - Complete POLST form as appropriate  - Assess patient's ability to be responsible for managing their own health  - Refer to Case Management Department for coordinating discharge planning if the patient needs post-hospital services based on physician/LIP order or complex needs related to functional status, cognitive ability or social support system  Outcome: Progressing     Problem: SKIN/TISSUE INTEGRITY - ADULT  Goal: Skin integrity remains intact  Description: INTERVENTIONS  - Assess and document risk factors for pressure ulcer development  - Assess and document skin integrity  - Monitor for areas of redness and/or skin breakdown  - Initiate interventions, skin care algorithm/standards of care as needed  Outcome: Progressing     Problem: MUSCULOSKELETAL - ADULT  Goal: Return mobility to safest level of function  Description: INTERVENTIONS:  - Assess patient stability and activity tolerance for standing, transferring and ambulating w/ or w/o assistive devices  - Assist with transfers and ambulation using safe patient handling equipment as needed  - Ensure adequate protection for wounds/incisions during mobilization  - Obtain PT/OT consults as needed  - Advance activity as appropriate  - Communicate ordered activity level and limitations with patient/family  Outcome: Progressing     Problem: Impaired Swallowing  Goal: Minimize aspiration risk  Description: Interventions:  - Patient should be alert and upright for all feedings (90 degrees preferred)  - Offer food and liquids at a slow rate  - No straws  - Encourage small bites of food and small sips of liquid  - Offer pills one at a time, crush or deliver with applesauce as needed  - Discontinue feeding and notify MD (or speech pathologist) if coughing or persistent throat clearing or  wet/gurgly vocal quality is noted  Outcome: Progressing

## 2025-01-29 NOTE — CM/SW NOTE
PT/OT/PMR recommending acute rehab. Met with patient and daughter, provide list of Acute rehab options. SW/CM to f/u with choice. Will need insurance authorization prior to discharge.    ROBERT Borrego [id number]

## 2025-01-29 NOTE — PLAN OF CARE
Problem: HEMATOLOGIC - ADULT  Goal: Free from bleeding injury  Description: (Example usage: patient with low platelets)  INTERVENTIONS:  - Avoid intramuscular injections, enemas and rectal medication administration  - Ensure safe mobilization of patient  - Hold pressure on venipuncture sites to achieve adequate hemostasis  - Assess for signs and symptoms of internal bleeding  - Monitor lab trends  - Patient is to report abnormal signs of bleeding to staff  - Avoid use of toothpicks and dental floss  - Use electric shaver for shaving  - Use soft bristle tooth brush  - Limit straining and forceful nose blowing  Outcome: Progressing     Problem: Patient Centered Care  Goal: Patient preferences are identified and integrated in the patient's plan of care  Description: Interventions:  - What would you like us to know as we care for you? From home with daughter   - Provide timely, complete, and accurate information to patient/family  - Incorporate patient and family knowledge, values, beliefs, and cultural backgrounds into the planning and delivery of care  - Encourage patient/family to participate in care and decision-making at the level they choose  - Honor patient and family perspectives and choices  Outcome: Progressing     Problem: Diabetes/Glucose Control  Goal: Glucose maintained within prescribed range  Description: INTERVENTIONS:  - Monitor Blood Glucose as ordered  - Assess for signs and symptoms of hyperglycemia and hypoglycemia  - Administer ordered medications to maintain glucose within target range  - Assess barriers to adequate nutritional intake and initiate nutrition consult as needed  - Instruct patient on self management of diabetes  Outcome: Progressing     Problem: Patient/Family Goals  Goal: Patient/Family Long Term Goal  Description: Patient's Long Term Goal: discharge from hospital     Interventions:  - monitor vital signs   - monitor blood glucose levels  - monitor appropriate labs  - pain  management   - administer medications per order  - follow MD orders  - diagnostics per order  - update and inform patient and family on plan of care  - discharge planning  - See additional Care Plan goals for specific interventions  Outcome: Progressing  Goal: Patient/Family Short Term Goal  Description: Patient's Short Term Goal: manage pain     Interventions:   - monitor vital signs   - monitor blood glucose levels  - monitor appropriate labs  - pain management   - administer medications per order  - follow MD orders  - diagnostics per order  - update and inform patient and family on plan of care  - See additional Care Plan goals for specific interventions  Outcome: Progressing     Problem: CARDIOVASCULAR - ADULT  Goal: Maintains optimal cardiac output and hemodynamic stability  Description: INTERVENTIONS:  - Monitor vital signs, rhythm, and trends  - Monitor for bleeding, hypotension and signs of decreased cardiac output  - Evaluate effectiveness of vasoactive medications to optimize hemodynamic stability  - Monitor arterial and/or venous puncture sites for bleeding and/or hematoma  - Assess quality of pulses, skin color and temperature  - Assess for signs of decreased coronary artery perfusion - ex. Angina  - Evaluate fluid balance, assess for edema, trend weights  Outcome: Progressing  Goal: Absence of cardiac arrhythmias or at baseline  Description: INTERVENTIONS:  - Continuous cardiac monitoring, monitor vital signs, obtain 12 lead EKG if indicated  - Evaluate effectiveness of antiarrhythmic and heart rate control medications as ordered  - Initiate emergency measures for life threatening arrhythmias  - Monitor electrolytes and administer replacement therapy as ordered  Outcome: Progressing     Problem: METABOLIC/FLUID AND ELECTROLYTES - ADULT  Goal: Glucose maintained within prescribed range  Description: INTERVENTIONS:  - Monitor Blood Glucose as ordered  - Assess for signs and symptoms of hyperglycemia and  hypoglycemia  - Administer ordered medications to maintain glucose within target range  - Assess barriers to adequate nutritional intake and initiate nutrition consult as needed  - Instruct patient on self management of diabetes  Outcome: Progressing  Goal: Electrolytes maintained within normal limits  Description: INTERVENTIONS:  - Monitor labs and rhythm and assess patient for signs and symptoms of electrolyte imbalances  - Administer electrolyte replacement as ordered  - Monitor response to electrolyte replacements, including rhythm and repeat lab results as appropriate  - Fluid restriction as ordered  - Instruct patient on fluid and nutrition restrictions as appropriate  Outcome: Progressing  Goal: Hemodynamic stability and optimal renal function maintained  Description: INTERVENTIONS:  - Monitor labs and assess for signs and symptoms of volume excess or deficit  - Monitor intake, output and patient weight  - Monitor urine specific gravity, serum osmolarity and serum sodium as indicated or ordered  - Monitor response to interventions for patient's volume status, including labs, urine output, blood pressure (other measures as available)  - Encourage oral intake as appropriate  - Instruct patient on fluid and nutrition restrictions as appropriate  Outcome: Progressing     Problem: NEUROLOGICAL - ADULT  Goal: Achieves stable or improved neurological status  Description: INTERVENTIONS  - Assess for and report changes in neurological status  - Initiate measures to prevent increased intracranial pressure  - Maintain blood pressure and fluid volume within ordered parameters to optimize cerebral perfusion and minimize risk of hemorrhage  - Monitor temperature, glucose, and sodium. Initiate appropriate interventions as ordered  Outcome: Progressing  Goal: Achieves maximal functionality and self care  Description: INTERVENTIONS  - Monitor swallowing and airway patency with patient fatigue and changes in neurological  status  - Encourage and assist patient to increase activity and self care with guidance from PT/OT  - Encourage visually impaired, hearing impaired and aphasic patients to use assistive/communication devices  Outcome: Progressing     Problem: SKIN/TISSUE INTEGRITY - ADULT  Goal: Skin integrity remains intact  Description: INTERVENTIONS  - Assess and document risk factors for pressure ulcer development  - Assess and document skin integrity  - Monitor for areas of redness and/or skin breakdown  - Initiate interventions, skin care algorithm/standards of care as needed  Outcome: Progressing     Problem: MUSCULOSKELETAL - ADULT  Goal: Return mobility to safest level of function  Description: INTERVENTIONS:  - Assess patient stability and activity tolerance for standing, transferring and ambulating w/ or w/o assistive devices  - Assist with transfers and ambulation using safe patient handling equipment as needed  - Ensure adequate protection for wounds/incisions during mobilization  - Obtain PT/OT consults as needed  - Advance activity as appropriate  - Communicate ordered activity level and limitations with patient/family  Outcome: Progressing     Problem: Impaired Swallowing  Goal: Minimize aspiration risk  Description: Interventions:  - Patient should be alert and upright for all feedings (90 degrees preferred)  - Offer food and liquids at a slow rate  - No straws  - Encourage small bites of food and small sips of liquid  - Offer pills one at a time, crush or deliver with applesauce as needed  - Discontinue feeding and notify MD (or speech pathologist) if coughing or persistent throat clearing or wet/gurgly vocal quality is noted  Outcome: Progressing     Problem: PAIN - ADULT  Goal: Verbalizes/displays adequate comfort level or patient's stated pain goal  Description: INTERVENTIONS:  - Encourage pt to monitor pain and request assistance  - Assess pain using appropriate pain scale  - Administer analgesics based on type  and severity of pain and evaluate response  - Implement non-pharmacological measures as appropriate and evaluate response  - Consider cultural and social influences on pain and pain management  - Manage/alleviate anxiety  - Utilize distraction and/or relaxation techniques  - Monitor for opioid side effects  - Notify MD/LIP if interventions unsuccessful or patient reports new pain  - Anticipate increased pain with activity and pre-medicate as appropriate  Outcome: Progressing     Problem: SAFETY ADULT - FALL  Goal: Free from fall injury  Description: INTERVENTIONS:  - Assess pt frequently for physical needs  - Identify cognitive and physical deficits and behaviors that affect risk of falls.  - [place] fall precautions as indicated by assessment.  - Educate pt/family on patient safety including physical limitations  - Instruct pt to call for assistance with activity based on assessment  - Modify environment to reduce risk of injury  - Provide assistive devices as appropriate  - Consider OT/PT consult to assist with strengthening/mobility  - Encourage toileting schedule  Outcome: Progressing     Problem: DISCHARGE PLANNING  Goal: Discharge to home or other facility with appropriate resources  Description: INTERVENTIONS:  - Identify barriers to discharge w/pt and caregiver  - Include patient/family/discharge partner in discharge planning  - Arrange for needed discharge resources and transportation as appropriate  - Identify discharge learning needs (meds, wound care, etc)  - Arrange for interpreters to assist at discharge as needed  - Consider post-discharge preferences of patient/family/discharge partner  - Complete POLST form as appropriate  - Assess patient's ability to be responsible for managing their own health  - Refer to Case Management Department for coordinating discharge planning if the patient needs post-hospital services based on physician/LIP order or complex needs related to functional status, cognitive  ability or social support system  Outcome: Progressing   Neuro checks stable. NIH 2. Pt reports visual hallucinations. Discussed with Dr Kang. Continue neuro checks Q2h during the day and Q4h at night. Portacath exchanged in IR today. Right groin site stable, no hematoma. Right chest incision with some new bloody drainage. Pressure dressing applied. Dr Boyle notified and examined patient. Plan to restart heparin gtt this evening. PTT pending. Care endorsed to oncoming RN. Pt and family updated about plan of care.

## 2025-01-29 NOTE — PROGRESS NOTES
[facility]  part of [facility]    Progress Note    Chester Bautista Patient Status:  Inpatient    1940 MRN [id number]   Location [facility] 3W/SW Attending Richard Guerra MD   Hosp Day # 11 PCP Heike Sorto MD       Subjective:   Resting comfortably, NAD. In good spirits.   S/p stent placement on 2025.  Venogram and port replacement later today.   Family present at the bedside.    Objective:   Blood pressure (!) 156/94, pulse 88, temperature 98.3 °F (36.8 °C), temperature source Temporal, resp. rate 19, height 5' 1\" (1.549 m), weight 135 lb 9.6 oz (61.5 kg), SpO2 94%.    General: AAF, NAD, ill appearing but improved  Respiratory: Clear to auscultation bilaterally. No wheezes. No rhonchi.  Cardiovascular: RRR  Abdomen: Soft, nontender, nondistended.  Neurologic: No focal neurological deficits.   Musculoskeletal: Moves all extremities.  Extremities: No edema or cyanosis.    Current Inpatient Medications:     Current Facility-Administered Medications:     magnesium hydroxide (Milk of Magnesia) 400 MG/5ML oral suspension 30 mL, 30 mL, Oral, Daily PRN    bisacodyl (Dulcolax) 10 MG rectal suppository 10 mg, 10 mg, Rectal, Daily PRN    fleet enema (Fleet) rectal enema 133 mL, 1 enema, Rectal, Once PRN    [Held by provider] heparin (Porcine) 39412 units/250mL infusion STROKE/NEURO CONTINUOUS, 200-3,000 Units/hr, Intravenous, Continuous    aspirin DR tab 81 mg, 81 mg, Oral, Daily    labetalol (Trandate) 5 mg/mL injection 10 mg, 10 mg, Intravenous, Q4H PRN    carvedilol (Coreg) tab 25 mg, 25 mg, Oral, BID with meals    glucose (Dex4) 15 GM/59ML oral liquid 15 g, 15 g, Oral, Q15 Min PRN **OR** glucose (Glutose) 40% oral gel 15 g, 15 g, Oral, Q15 Min PRN **OR** glucose-vitamin C (Dex-4) chewable tab 4 tablet, 4 tablet, Oral, Q15 Min PRN **OR** dextrose 50% injection 50 mL, 50 mL, Intravenous, Q15 Min PRN **OR** glucose (Dex4) 15 GM/59ML oral liquid 30 g, 30 g, Oral, Q15  Min PRN **OR** glucose (Glutose) 40% oral gel 30 g, 30 g, Oral, Q15 Min PRN **OR** glucose-vitamin C (Dex-4) chewable tab 8 tablet, 8 tablet, Oral, Q15 Min PRN    lidocaine-menthol 4-1 % patch 1 patch, 1 patch, Transdermal, Daily    midazolam (Versed) 2 MG/2ML injection 2 mg, 2 mg, Intravenous, Once    octreoTIDe (SandoSTATIN) 50 mcg/mL injection 50 mcg, 50 mcg, Subcutaneous, Q8H    HYDROmorphone (Dilaudid) 1 MG/ML injection 0.2 mg, 0.2 mg, Intravenous, Q4H PRN    pantoprazole (Protonix) 40 mg in sodium chloride 0.9% PF 10 mL IV push, 40 mg, Intravenous, Q12H    sodium chloride 0.9% infusion, , Intravenous, Continuous    rosuvastatin (Crestor) tab 10 mg, 10 mg, Oral, Nightly    sennosides (Senokot) tab 8.6 mg, 8.6 mg, Oral, BID    ceFEPIme (Maxipime) 1 g in sodium chloride 0.9% 100 mL IVPB-MBP, 1 g, Intravenous, Q24H    cetirizine (ZyrTEC) tab 5 mg, 5 mg, Oral, Daily    acetaminophen (Tylenol Extra Strength) tab 500 mg, 500 mg, Oral, Q6H PRN    docusate sodium (Colace) cap 100 mg, 100 mg, Oral, BID    meclizine (Antivert) tab 12.5 mg, 12.5 mg, Oral, TID PRN    acetaminophen (Tylenol) tab 650 mg, 650 mg, Oral, Q4H PRN **OR** acetaminophen (Tylenol) rectal suppository 650 mg, 650 mg, Rectal, Q4H PRN    hydrALAzine (Apresoline) 20 mg/mL injection 10 mg, 10 mg, Intravenous, Q2H PRN    ondansetron (Zofran) 4 MG/2ML injection 4 mg, 4 mg, Intravenous, Q6H PRN    metoclopramide (Reglan) 5 mg/mL injection 5 mg, 5 mg, Intravenous, Q8H PRN    acetaminophen (Tylenol) tab 650 mg, 650 mg, Oral, Q6H PRN    ondansetron (Zofran) 4 MG/2ML injection 4 mg, 4 mg, Intravenous, Q6H PRN    albuterol (Ventolin) (2.5 MG/3ML) 0.083% nebulizer solution 2.5 mg, 2.5 mg, Nebulization, Q4H PRN    cyclobenzaprine (Flexeril) tab 10 mg, 10 mg, Oral, Nightly PRN    fluticasone-salmeterol (Advair Diskus) 250-50 MCG/ACT inhaler 1 puff, 1 puff, Inhalation, 2 times per day    gabapentin (Neurontin) cap 100 mg, 100 mg, Oral, Nightly    montelukast  (Singulair) tab 10 mg, 10 mg, Oral, Nightly    predniSONE (Deltasone) tab 5 mg, 5 mg, Oral, Daily    losartan (Cozaar) tab 50 mg, 50 mg, Oral, Daily    hydroxychloroquine (Plaquenil) tab 300 mg, 300 mg, Oral, Daily    oxyCODONE immediate release tab 5 mg, 5 mg, Oral, Q6H PRN    insulin aspart (NovoLOG) 100 Units/mL FlexPen 1-11 Units, 1-11 Units, Subcutaneous, TID CC    insulin degludec (Tresiba) 100 units/mL flextouch 10 Units, 10 Units, Subcutaneous, Nightly      Results:     Recent Labs   Lab 01/27/25  0514 01/27/25  1009 01/28/25  0536 01/29/25  0501   RBC 3.00*  --  2.96* 2.90*  2.90*   HGB 7.4* 7.8* 7.4* 7.2*  7.2*   HCT 24.3* 25.0* 23.4* 23.7*  23.7*   MCV 81.0  --  79.1* 81.7  81.7   MCH 24.7*  --  25.0* 24.8*  24.8*   MCHC 30.5*  --  31.6 30.4*  30.4*   RDW 16.8*  --  17.2* 17.4*  17.4*   NEPRELIM 13.37*  --  9.39* 8.54*   WBC 17.2*  --  13.3* 12.5*  12.5*   .0  --  320.0 311.0  311.0         Recent Labs   Lab 01/27/25  0514 01/27/25  2254 01/28/25  0559 01/28/25  1808 01/29/25  0501   *  --  151*  --  155*  155*   BUN 8*  --  8*  --  11  11   CREATSERUM 0.89  --  0.95  --  1.03*  1.03*   EGFRCR 63  --  59*  --  53*  53*   CA 9.1  --  9.5  --  9.5  9.5     --  143  --  142  142   K 3.5  3.5   < > 3.6 4.9 4.0  4.0     --  111  --  110  110   CO2 23.0  --  23.0  --  23.0  23.0    < > = values in this interval not displayed.         Imaging:   XR CHEST AP PORTABLE  (CPT=71045)    Result Date: 1/29/2025  CONCLUSION:  1. Cardiomegaly.  Tortuous atherosclerotic aorta. 2. Extensive bilateral mixed alveolar and interstitial multifocal airspace opacification with interval progression in the upper lobes.    Dictated by (CST): Louis Moreno MD on 1/29/2025 at 9:03 AM     Finalized by (CST): Louis Moreno MD on 1/29/2025 at 9:06 AM          CT BRAIN OR HEAD (CPT=70450)    Result Date: 1/28/2025  CONCLUSION:  1.  Findings compatible with recent/subacute infarcts  within the bilateral temporal lobes, left parietal-occipital lobe junction, and scattered throughout the frontal lobe near the vertex.  These findings as well as other foci are better visualized on recent preceding MRI.  No hemorrhage. 2.  Post right frontal craniotomy.  Post vascular clip in the anterior cerebral artery distribution.    Dictated by (CST): Fermín Castro MD on 1/28/2025 at 5:23 PM     Finalized by (CST): Fermín Castro MD on 1/28/2025 at 5:26 PM          XR CHEST AP PORTABLE  (CPT=71045)    Result Date: 1/28/2025  CONCLUSION:  1. Cardiomegaly tortuous thoracic aorta 2. Bilateral mixed alveolar and interstitial multifocal airspace opacification with interval progression. 3. Bilateral central venous catheters with the tip in the distal SVC near the atrial junction.  No pneumothorax.  Left-sided Mediastinal vascular stent in place.     Dictated by (CST): Louis Moreno MD on 1/28/2025 at 8:25 AM     Finalized by (CST): Louis Moreno MD on 1/28/2025 at 8:29 AM          US VENOUS DOPPLER LEG BILAT - DIAG IMG (CPT=93970)    Result Date: 1/27/2025  CONCLUSION: No evidence of left or right lower extremity DVT.   Dictated by (CST): Fermín Castro MD on 1/27/2025 at 9:51 PM     Finalized by (CST): Fermín Castro MD on 1/27/2025 at 9:53 PM          XR PORT VENOUS W FLUORO INJ (ETD=08245)    Result Date: 1/27/2025  CONCLUSION: Fibrin sheath around the distal 5 cm segment of the Port-A-Cath resulting in inability to aspirate.  Please note, however, that the Port-A-Cath is in good position and can be used for injection as contrast/fluid is able to pass around the fibrin sheath into the right atrium.    Dictated by (CST): Parker Keane MD on 1/27/2025 at 1:52 PM     Finalized by (CST): Parker Keane MD on 1/27/2025 at 1:55 PM                 Assessment and Plan:   #Left Cerebellar Infarcts  #SAH  - pt with new sudden onset neuro changes  - 2 small cerebellar infarcts seen on admission MRI  -Patient  already took 3 baby aspirin's prior to coming to ED   - CTA negative for LVO, but showing small subsegmental Pes  - Stroke alert called 1/19 -> stroke CT non-acute, MRI showing small CVA in L thalamus   - f/u lipid panel, A1c, TSH, ECHO   - Neuro on consult  -Continue statin  -Brillinta resumed 01/26/2025  -high risk morbidity/mortality given her history of GI bleed and now need for antiplatelet therapy and potential anticoagulation in the future. Family is aware  -gradually lower BP  -Vascular surgery on consult  -s/p stent placement 10/24/2025  -Brilinta x6 months  -CT head reviewed, repeat CT pending  -Appreciate further neuro and vascular surgery input  -venogram scheduled for later today  -continue present management    #Dizziness  Improved   -Likely in setting of above  -Meclizine prn   -Neurology consulted   -continue brilinta and statin    #Anemia  -in the setting of PE and aortic arch thrombus, the decision was made to resume anticoagulation despite the patients history of bleeding AVMs  -Transfused 1U pRBC, 1U platelets  -monitor H&H, transfuse as indicated  -GI on consult   Start octreotide TID  Continue Brilinta as the benefit of AC are >> risks of bleeding  Possible endoscopic eval vs transfer to a tertiary care center for balloon enteroscopy and colonoscopy  Currently on a heparin drip  Monitor H&H  -Hb continues to trend down gradually, transfuse to maintain Hb > 7     #PE   -Patient is hemodynamically stable, EKG NSR and troponin not elevated.  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 Already took 3 baby aspirins prior to admission  -Previously had IVC filter placed in March 2024 for PE at that time (removed June 2024).   -Needs eval for hypercoagulable state. Risk factors of recent car ride, overall sedentary state, breast cancer. Sees Dr. Mohamud Hem in clinic for anemia, needs further follow up   -given current presentation the patient  was restarted on AC, close Hb monitoring  -heparin drip at this time, change to PO at the time of discharge     #Descending aortic arch thrombus   -Admission CTA chest revealing mural thrombus throughout the thoracic and abdominal aorta, chronic per radiology  -Vascular surgery on consult  -planning for angiogram 01/24/2025  -cont brilinta and statin for now      #HTN  -Hold home antihypertensives to allow for permissive hypertension poststroke     #Klebsiella UTI  -nitrofurantoin initiated in ED, UCx reviewed - nitrofurantoin resistant  -started on IV Cefepime - day 6  -deescalate abx as indicated    #Constipation  -bowel regimen ordered     Chronic Medical Problems  HTN  HLD  DM  COPD/?sarcoidosis   Rheumatoid Arthritis  DVT/PE s/p IVC which has been removed   CKD3  Anemia - in setting of bleeding AVMs; getting iron infusions   Hx of Breast Cancer     DVT Ppx: Heparin  Full code  MDM: High

## 2025-01-29 NOTE — PROGRESS NOTES
[facility]  part of [facility]    Progress Note      Assessment and Plan:   1.  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  As the risk of further thromboembolic event is high, low-dose heparin was initiated yesterday and there is no evidence of active bleed.  Hemoglobin went from 7.4-7.2.    Recommendations:  1.  Low-dose heparin drip and monitor hemoglobin  2.  As per neurology.  3.  Permissive hypertension    2.  GI bleed-has had multiple AVMs identified previously.  Hemoglobin now 7.2.    Recommendations: As per GI.  PPI.  Serial hemoglobin.  If further bleeding, patient would benefit from [facility] for long enteroscopy for ablation of AVMs.    3.  Bilateral PE-had IVC filter at 1 time now removed.  Lower extremity ultrasound now negative for clot.    Recommendations: Low-dose heparin for now.    4.  Left groin hematoma-conservative management    5.  Chronic respiratory failure-patient has combined pulmonary fibrosis with emphysema.    Recommendations: Advair and conservative management at present    6.  Klebsiella UTI-on cefepime    7.  Port revision-IV heparin held temporarily prior to port revision.    Subjective:   Chester Bautista is a(n) 84 year old female who is breathing comfortably and moving better.    Objective:   Blood pressure (!) 156/94, pulse 88, temperature 98.3 °F (36.8 °C), temperature source Temporal, resp. rate 19, height 5' 1\" (1.549 m), weight 135 lb 9.6 oz (61.5 kg), SpO2 94%.    Physical Exam alert white female  HEENT examination is unremarkable with pupils equal round and reactive to light and accommodation.   Neck without adenopathy, thyromegaly, JVD nor bruit.   Lungs clear to auscultation and percussion.  Cardiac regular rate and rhythm no murmur.   Abdomen nontender, without hepatosplenomegaly and no mass appreciable.   Extremities without clubbing cyanosis nor edema.   Neurologic grossly intact  with symmetric tone and strength and reflex except for lingering right homonymous hemianopsia partial.  Skin without gross abnormality     Results:     Lab Results   Component Value Date    WBC 12.5 01/29/2025    WBC 12.5 01/29/2025    HGB 7.2 01/29/2025    HGB 7.2 01/29/2025    HCT 23.7 01/29/2025    HCT 23.7 01/29/2025    .0 01/29/2025    .0 01/29/2025    CREATSERUM 1.03 01/29/2025    CREATSERUM 1.03 01/29/2025    BUN 11 01/29/2025    BUN 11 01/29/2025     01/29/2025     01/29/2025    K 4.0 01/29/2025    K 4.0 01/29/2025     01/29/2025     01/29/2025    CO2 23.0 01/29/2025    CO2 23.0 01/29/2025     01/29/2025     01/29/2025    CA 9.5 01/29/2025    CA 9.5 01/29/2025    ALB 3.2 01/29/2025    ALKPHO 77 01/29/2025    BILT 0.4 01/29/2025    TP 6.5 01/29/2025    AST 21 01/29/2025    ALT <7 01/29/2025    .1 01/29/2025    MG 1.6 01/29/2025       Chapin Parker MD  Medical Director, Critical Care, [facility]  Medical Director, [facility]  Pager: [phone]

## 2025-01-29 NOTE — PHYSICAL THERAPY NOTE
PHYSICAL THERAPY TREATMENT NOTE - INPATIENT     Room Number: 217/217-A       Presenting Problem: cerebellar CVA  Co-Morbidities : COPD, diabetes, HTN, RA, GI bleed    Problem List  Principal Problem:    Cerebellar stroke (HCC)  Active Problems:    AVM (arteriovenous malformation) of small bowel, acquired with hemorrhage    Dizziness    Right hand paresthesia    Thrombus of aorta (HCC)    Bilateral pulmonary embolism (HCC)    Acute cystitis without hematuria    Small bowel bleed not requiring more than 4 units of blood in 24 hours, ICU, or surgery    Acute blood loss anemia    Cerebrovascular accident (CVA) due to embolism of precerebral artery (HCC)    Melena      PHYSICAL THERAPY ASSESSMENT   Patient demonstrates fair progress this session, goals  remain in progress.      Patient is requiring minimal assist as a result of the following impairments: decreased functional strength, decreased endurance/aerobic capacity, and impaired dynamic balance.     Patient continues to function below baseline with bed mobility, transfers, and gait.  Next session anticipate patient to progress bed mobility, transfers, and gait.  Physical Therapy will continue to follow patient for duration of hospitalization.    Patient continues to benefit from continued skilled PT services: to facilitate return to prior level of function as patient demonstrates high motivation with excellent tolerance to an intensive therapy program .    PLAN DURING HOSPITALIZATION  Nursing Mobility Recommendation : 1 Assist  PT Device Recommendation: Rolling walker;Gait belt  PT Treatment Plan: Bed mobility  Frequency (Obs): 5x/week     SUBJECTIVE  \"I can keep going but I am dizzy\"    OBJECTIVE  Precautions: Bed/chair alarm    WEIGHT BEARING RESTRICTION       PAIN ASSESSMENT   Ratin  Location: denies  Management Techniques: Activity promotion;Body mechanics;Repositioning (gentle/light soft tissue mobilization)    BALANCE  Static Sitting: Fair  Dynamic  Sitting: Fair  Static Standing: Fair -  Dynamic Standing: Poor +    ACTIVITY TOLERANCE           BP: 101/86              O2 WALK       AM-PAC '6-Clicks' INPATIENT SHORT FORM - BASIC MOBILITY  How much difficulty does the patient currently have...  Patient Difficulty: Turning over in bed (including adjusting bedclothes, sheets and blankets)?: A Little   Patient Difficulty: Sitting down on and standing up from a chair with arms (e.g., wheelchair, bedside commode, etc.): A Little   Patient Difficulty: Moving from lying on back to sitting on the side of the bed?: A Little   How much help from another person does the patient currently need...   Help from Another: Moving to and from a bed to a chair (including a wheelchair)?: A Little   Help from Another: Need to walk in hospital room?: A Little   Help from Another: Climbing 3-5 steps with a railing?: A Little     AM-PAC Score:  Raw Score: 18   Approx Degree of Impairment: 46.58%   Standardized Score (AM-PAC Scale): 43.63   CMS Modifier (G-Code): CK    FUNCTIONAL ABILITY STATUS  Functional Mobility/Gait Assessment  Gait Assistance: Contact guard assist  Distance (ft): 21  Assistive Device: Rolling walker  Pattern:  (Slow lela, unsteady, trouble navigating walker, left inattention and needs cues to avoid obstacles)  Rolling: supervision  Supine to Sit: supervision  Sit to Supine: minimal assist  Sit to Stand: minimal assist    Skilled Therapy Provided: standing balance training & pre gait wt shifting, gait training, safety and scanning for obstacles    The patient's Approx Degree of Impairment: 46.58% has been calculated based on documentation in the [facility] '6 clicks' Inpatient Daily Activity Short Form.  Research supports that patients with this level of impairment may benefit from HH.  Final disposition will be made by interdisciplinary medical team.    THERAPEUTIC EXERCISES  Lower Extremity Alternating marching  Ankle pumps  LAQ     Position Sitting  10xea     Patient  End of Session: Up in chair    CURRENT GOALS   Goals to be met by: 2/3/25 *date updated*  Patient Goal Patient's self-stated goal is: go home, be stronger   Goal #1 Patient is able to demonstrate supine - sit EOB @ level: modified independent      Goal #1   Current Status NT   Goal #2 Patient is able to demonstrate transfers Sit to/from Stand at assistance level: modified independent with walker - rolling      Goal #2  Current Status NT   Goal #3 Patient is able to ambulate 150 feet with assist device: walker - rolling at assistance level: supervision   Goal #3   Current Status NT   Goal #4 Patient will negotiate 10 stairs with rail and supervision   Goal #4   Current Status Not tested   Goal #5 Patient to demonstrate independence with home activity/exercise instructions provided to patient in preparation for discharge.   Goal #5   Current Status progressing     Gait: 11min  Therapeutic Activity: 13 minutes

## 2025-01-30 LAB
ALBUMIN SERPL-MCNC: 3.2 G/DL (ref 3.2–4.8)
ALBUMIN/GLOB SERPL: 1.1 {RATIO} (ref 1–2)
ALP LIVER SERPL-CCNC: 75 U/L
ALT SERPL-CCNC: <7 U/L
ANION GAP SERPL CALC-SCNC: 8 MMOL/L (ref 0–18)
ANION GAP SERPL CALC-SCNC: 8 MMOL/L (ref 0–18)
ANTIBODY SCREEN: NEGATIVE
APTT PPP: 51.3 SECONDS (ref 23–36)
AST SERPL-CCNC: 21 U/L (ref ?–34)
BASOPHILS # BLD AUTO: 0.06 X10(3) UL (ref 0–0.2)
BASOPHILS NFR BLD AUTO: 0.5 %
BILIRUB SERPL-MCNC: 0.3 MG/DL (ref 0.2–1.1)
BUN BLD-MCNC: 20 MG/DL (ref 9–23)
BUN BLD-MCNC: 20 MG/DL (ref 9–23)
BUN/CREAT SERPL: 19.4 (ref 10–20)
BUN/CREAT SERPL: 19.4 (ref 10–20)
CALCIUM BLD-MCNC: 9.3 MG/DL (ref 8.7–10.4)
CALCIUM BLD-MCNC: 9.3 MG/DL (ref 8.7–10.4)
CHLORIDE SERPL-SCNC: 107 MMOL/L (ref 98–112)
CHLORIDE SERPL-SCNC: 107 MMOL/L (ref 98–112)
CO2 SERPL-SCNC: 24 MMOL/L (ref 21–32)
CO2 SERPL-SCNC: 24 MMOL/L (ref 21–32)
CREAT BLD-MCNC: 1.03 MG/DL
CREAT BLD-MCNC: 1.03 MG/DL
DEPRECATED RDW RBC AUTO: 52.2 FL (ref 35.1–46.3)
EGFRCR SERPLBLD CKD-EPI 2021: 53 ML/MIN/1.73M2 (ref 60–?)
EGFRCR SERPLBLD CKD-EPI 2021: 53 ML/MIN/1.73M2 (ref 60–?)
EOSINOPHIL # BLD AUTO: 0.45 X10(3) UL (ref 0–0.7)
EOSINOPHIL NFR BLD AUTO: 3.9 %
ERYTHROCYTE [DISTWIDTH] IN BLOOD BY AUTOMATED COUNT: 17.7 % (ref 11–15)
GLOBULIN PLAS-MCNC: 3 G/DL (ref 2–3.5)
GLUCOSE BLD-MCNC: 159 MG/DL (ref 70–99)
GLUCOSE BLD-MCNC: 159 MG/DL (ref 70–99)
GLUCOSE BLDC GLUCOMTR-MCNC: 164 MG/DL (ref 70–99)
GLUCOSE BLDC GLUCOMTR-MCNC: 189 MG/DL (ref 70–99)
GLUCOSE BLDC GLUCOMTR-MCNC: 271 MG/DL (ref 70–99)
HCT VFR BLD AUTO: 21.3 %
HCT VFR BLD AUTO: 27.9 %
HGB BLD-MCNC: 6.6 G/DL
HGB BLD-MCNC: 9 G/DL
IMM GRANULOCYTES # BLD AUTO: 0.07 X10(3) UL (ref 0–1)
IMM GRANULOCYTES NFR BLD: 0.6 %
LYMPHOCYTES # BLD AUTO: 1.96 X10(3) UL (ref 1–4)
LYMPHOCYTES NFR BLD AUTO: 16.9 %
MAGNESIUM SERPL-MCNC: 1.8 MG/DL (ref 1.6–2.6)
MCH RBC QN AUTO: 25 PG (ref 26–34)
MCHC RBC AUTO-ENTMCNC: 31 G/DL (ref 31–37)
MCV RBC AUTO: 80.7 FL
MONOCYTES # BLD AUTO: 1.07 X10(3) UL (ref 0.1–1)
MONOCYTES NFR BLD AUTO: 9.2 %
NEUTROPHILS # BLD AUTO: 7.97 X10 (3) UL (ref 1.5–7.7)
NEUTROPHILS # BLD AUTO: 7.97 X10(3) UL (ref 1.5–7.7)
NEUTROPHILS NFR BLD AUTO: 68.9 %
OSMOLALITY SERPL CALC.SUM OF ELEC: 294 MOSM/KG (ref 275–295)
OSMOLALITY SERPL CALC.SUM OF ELEC: 294 MOSM/KG (ref 275–295)
PLATELET # BLD AUTO: 308 10(3)UL (ref 150–450)
POTASSIUM SERPL-SCNC: 3.7 MMOL/L (ref 3.5–5.1)
POTASSIUM SERPL-SCNC: 3.7 MMOL/L (ref 3.5–5.1)
PROT SERPL-MCNC: 6.2 G/DL (ref 5.7–8.2)
RBC # BLD AUTO: 2.64 X10(6)UL
RH BLOOD TYPE: POSITIVE
SODIUM SERPL-SCNC: 139 MMOL/L (ref 136–145)
SODIUM SERPL-SCNC: 139 MMOL/L (ref 136–145)
WBC # BLD AUTO: 11.6 X10(3) UL (ref 4–11)

## 2025-01-30 PROCEDURE — 99233 SBSQ HOSP IP/OBS HIGH 50: CPT | Performed by: INTERNAL MEDICINE

## 2025-01-30 PROCEDURE — 99291 CRITICAL CARE FIRST HOUR: CPT | Performed by: OTHER

## 2025-01-30 PROCEDURE — 99232 SBSQ HOSP IP/OBS MODERATE 35: CPT | Performed by: PHYSICIAN ASSISTANT

## 2025-01-30 RX ORDER — POTASSIUM CHLORIDE 14.9 MG/ML
20 INJECTION INTRAVENOUS ONCE
Status: COMPLETED | OUTPATIENT
Start: 2025-01-30 | End: 2025-01-30

## 2025-01-30 RX ORDER — MAGNESIUM OXIDE 400 MG/1
400 TABLET ORAL ONCE
Status: COMPLETED | OUTPATIENT
Start: 2025-01-30 | End: 2025-01-30

## 2025-01-30 RX ORDER — SODIUM CHLORIDE 9 MG/ML
INJECTION, SOLUTION INTRAVENOUS ONCE
Status: COMPLETED | OUTPATIENT
Start: 2025-01-30 | End: 2025-01-30

## 2025-01-30 NOTE — PROGRESS NOTES
[facility]  part of [facility]  Progress Note    Chester Bautista Patient Status:  Inpatient    1940 MRN [id number]   Location [facility] 2W/SW Attending Bonny Carranza MD   Hosp Day # 12 PCP Heike Sorto MD       Subjective:   No complaints.    Objective:   Right chest port site with oozing.  Mann needle removed, pressure applied, elevated HOB.    Blood pressure 154/66, pulse 92, temperature 97.6 °F (36.4 °C), temperature source Temporal, resp. rate 21, height 61\", weight 135 lb 9.6 oz (61.5 kg), SpO2 97%.    Results:   Labs:  Lab Results   Component Value Date    WBC 11.6 2025    RBC 2.64 2025    HGB 6.6 2025    HCT 21.3 2025    MCV 80.7 2025    MCH 25.0 2025    MCHC 31.0 2025    RDW 17.7 2025    .0 2025       Microbiology:  No results for input(s): \"URINE\", \"CULTI\", \"BLDSMR\" in the last 168 hours.    XR CHEST AP PORTABLE  (CPT=71045)    Result Date: 2025  CONCLUSION:  1. Cardiomegaly.  Tortuous atherosclerotic aorta. 2. Extensive bilateral mixed alveolar and interstitial multifocal airspace opacification with interval progression in the upper lobes.    Dictated by (CST): Louis Moreno MD on 2025 at 9:03 AM     Finalized by (CST): Louis Moreno MD on 2025 at 9:06 AM          CT BRAIN OR HEAD (CPT=70450)    Result Date: 2025  CONCLUSION:  1.  Findings compatible with recent/subacute infarcts within the bilateral temporal lobes, left parietal-occipital lobe junction, and scattered throughout the frontal lobe near the vertex.  These findings as well as other foci are better visualized on recent preceding MRI.  No hemorrhage. 2.  Post right frontal craniotomy.  Post vascular clip in the anterior cerebral artery distribution.    Dictated by (CST): Femrín Castro MD on 2025 at 5:23 PM     Finalized by (CST): Fermín Castro MD on 2025 at 5:26 PM            Assessment and  Plan:   S/P removal of nonfunctioning port, placement of new chest port.  She had been on heparin which subsequently it has been stopped for oozing from new chest port site.  Oozing has stopped after removing le needle and applying pressure.       ROSSY HAGER, APRN  1/30/2025

## 2025-01-30 NOTE — PROGRESS NOTES
[facility]  Vascular Surgery Progress Note    Chester Bautista Patient Status:  Inpatient    1940 MRN [id number]   Location [facility] 2W/SW Attending Bonny Carranza MD   Hosp Day # 11 PCP Heike Sorto MD     Objective:   Temp: 96.9 °F (36.1 °C)  Pulse: 81  Resp: 21  BP: 159/76    Intake/Output:     Intake/Output Summary (Last 24 hours) at 2025  Last data filed at 2025 1500  Gross per 24 hour   Intake 236.4 ml   Output 575 ml   Net -338.6 ml       Events Yesterday/Overnight:  Visited the patient at the bedside.  She is doing well.    Exam:  Vital signs are within normal limit.  Her motor and sensory exam is also at patient's baseline similar to preprocedure.    Impression/Plan:  84 year old female with past medical history of COPD, DM, HTN and gastric AVM complicated with recurrent GI bleed who presented with left cerebellar stroke on Saturday, 2025.  Her hospital course was complicated with another stroke identified in the left thalamus on MRI on 2025.  She is S/P L SCA Stent. Post-operative course was notable for severe headache which was worked up with a CT head being concerning for SAH. However the subsequent CT scan ruled out SAH, but were notable for new small ischemic strokes. MRI on 2025 was notable for left parietal, occipital and temporal regions as well as bilateral frontal cortext. Patient had altered mental status on 2025, however that has improved and now she is alert and oriented with motor/sensory function close to her baseline. She was started on her Berlinta.      Plan:  -Recommend continuation of Brilinta for at least 6 months.  -  are working on a rehab placement.  - Please continue working with physical therapy.  - Please call with questions or concerns.    Medications:    Current Facility-Administered Medications:     magnesium hydroxide (Milk of Magnesia) 400 MG/5ML oral suspension 30 mL, 30 mL, Oral, Daily PRN     bisacodyl (Dulcolax) 10 MG rectal suppository 10 mg, 10 mg, Rectal, Daily PRN    fleet enema (Fleet) rectal enema 133 mL, 1 enema, Rectal, Once PRN    melatonin tab 3 mg, 3 mg, Oral, Nightly    heparin (Porcine) 08892 units/250mL infusion STROKE/NEURO CONTINUOUS, 200-3,000 Units/hr, Intravenous, Continuous    aspirin DR tab 81 mg, 81 mg, Oral, Daily    labetalol (Trandate) 5 mg/mL injection 10 mg, 10 mg, Intravenous, Q4H PRN    carvedilol (Coreg) tab 25 mg, 25 mg, Oral, BID with meals    glucose (Dex4) 15 GM/59ML oral liquid 15 g, 15 g, Oral, Q15 Min PRN **OR** glucose (Glutose) 40% oral gel 15 g, 15 g, Oral, Q15 Min PRN **OR** glucose-vitamin C (Dex-4) chewable tab 4 tablet, 4 tablet, Oral, Q15 Min PRN **OR** dextrose 50% injection 50 mL, 50 mL, Intravenous, Q15 Min PRN **OR** glucose (Dex4) 15 GM/59ML oral liquid 30 g, 30 g, Oral, Q15 Min PRN **OR** glucose (Glutose) 40% oral gel 30 g, 30 g, Oral, Q15 Min PRN **OR** glucose-vitamin C (Dex-4) chewable tab 8 tablet, 8 tablet, Oral, Q15 Min PRN    lidocaine-menthol 4-1 % patch 1 patch, 1 patch, Transdermal, Daily    midazolam (Versed) 2 MG/2ML injection 2 mg, 2 mg, Intravenous, Once    octreoTIDe (SandoSTATIN) 50 mcg/mL injection 50 mcg, 50 mcg, Subcutaneous, Q8H    HYDROmorphone (Dilaudid) 1 MG/ML injection 0.2 mg, 0.2 mg, Intravenous, Q4H PRN    pantoprazole (Protonix) 40 mg in sodium chloride 0.9% PF 10 mL IV push, 40 mg, Intravenous, Q12H    sodium chloride 0.9% infusion, , Intravenous, Continuous    rosuvastatin (Crestor) tab 10 mg, 10 mg, Oral, Nightly    sennosides (Senokot) tab 8.6 mg, 8.6 mg, Oral, BID    ceFEPIme (Maxipime) 1 g in sodium chloride 0.9% 100 mL IVPB-MBP, 1 g, Intravenous, Q24H    cetirizine (ZyrTEC) tab 5 mg, 5 mg, Oral, Daily    acetaminophen (Tylenol Extra Strength) tab 500 mg, 500 mg, Oral, Q6H PRN    docusate sodium (Colace) cap 100 mg, 100 mg, Oral, BID    meclizine (Antivert) tab 12.5 mg, 12.5 mg, Oral, TID PRN    acetaminophen  (Tylenol) tab 650 mg, 650 mg, Oral, Q4H PRN **OR** acetaminophen (Tylenol) rectal suppository 650 mg, 650 mg, Rectal, Q4H PRN    hydrALAzine (Apresoline) 20 mg/mL injection 10 mg, 10 mg, Intravenous, Q2H PRN    ondansetron (Zofran) 4 MG/2ML injection 4 mg, 4 mg, Intravenous, Q6H PRN    metoclopramide (Reglan) 5 mg/mL injection 5 mg, 5 mg, Intravenous, Q8H PRN    acetaminophen (Tylenol) tab 650 mg, 650 mg, Oral, Q6H PRN    ondansetron (Zofran) 4 MG/2ML injection 4 mg, 4 mg, Intravenous, Q6H PRN    albuterol (Ventolin) (2.5 MG/3ML) 0.083% nebulizer solution 2.5 mg, 2.5 mg, Nebulization, Q4H PRN    cyclobenzaprine (Flexeril) tab 10 mg, 10 mg, Oral, Nightly PRN    fluticasone-salmeterol (Advair Diskus) 250-50 MCG/ACT inhaler 1 puff, 1 puff, Inhalation, 2 times per day    gabapentin (Neurontin) cap 100 mg, 100 mg, Oral, Nightly    montelukast (Singulair) tab 10 mg, 10 mg, Oral, Nightly    predniSONE (Deltasone) tab 5 mg, 5 mg, Oral, Daily    losartan (Cozaar) tab 50 mg, 50 mg, Oral, Daily    hydroxychloroquine (Plaquenil) tab 300 mg, 300 mg, Oral, Daily    oxyCODONE immediate release tab 5 mg, 5 mg, Oral, Q6H PRN    insulin aspart (NovoLOG) 100 Units/mL FlexPen 1-11 Units, 1-11 Units, Subcutaneous, TID CC    insulin degludec (Tresiba) 100 units/mL flextouch 10 Units, 10 Units, Subcutaneous, Nightly    Laboratory/Data:    LABS:  Recent Labs   Lab 01/25/25  0332 01/25/25  0900 01/25/25  1604 01/25/25  2357 01/27/25  0031 01/27/25  0514 01/27/25  1009 01/28/25  0536 01/29/25  0501   WBC 14.9*  --  14.5*  --   --  17.2*  --  13.3* 12.5*  12.5*   HGB 8.0*   < > 7.7*   < > 7.6* 7.4* 7.8* 7.4* 7.2*  7.2*   MCV 79.6*  --  80.5  --   --  81.0  --  79.1* 81.7  81.7   .0  375.0  --  336.0  --   --  340.0  --  320.0 311.0  311.0   INR 1.04  --   --   --   --   --   --   --   --     < > = values in this interval not displayed.       Recent Labs   Lab 01/25/25  0652 01/26/25  0518 01/26/25  1730 01/27/25  0514  01/27/25  2254 01/28/25  0559 01/28/25  1808 01/29/25  0501    139  --  143  --  143  --  142  142   K 3.9  3.9 2.9*   < > 3.5  3.5 4.0 3.6 4.9 4.0  4.0    107  --  111  --  111  --  110  110   CO2 24.0 22.0  --  23.0  --  23.0  --  23.0  23.0   BUN 16 11  --  8*  --  8*  --  11  11   CREATSERUM 1.03* 0.88  --  0.89  --  0.95  --  1.03*  1.03*   * 65*  --  252*  --  151*  --  155*  155*   CA 10.1 9.8  --  9.1  --  9.5  --  9.5  9.5   MG 2.1 1.6  --  1.5*  --  1.6  --  1.6    < > = values in this interval not displayed.     Recent Labs   Lab 01/25/25  0332 01/28/25  0947 01/28/25  2126 01/29/25  0501 01/29/25  1845   PTP 14.3  --   --   --   --    INR 1.04  --   --   --   --    PTT 26.6   < > 102.7* 145.1* 33.1    < > = values in this interval not displayed.     Recent Labs   Lab 01/25/25  0652 01/26/25  0518 01/27/25  0514 01/28/25  0559 01/29/25  0501   ALT 9* <7* <7* <7* <7*   AST 18 23 20 22 21   ALB 3.6 3.4 3.2 3.3 3.2     No results for input(s): \"TROP\" in the last 168 hours.  Lab Results   Component Value Date    ANASCRN Negative 08/05/2022     No results for input(s): \"PCACT\", \"PSACT\", \"AT3ACT\", \"HIPAB\", \"PATHI\", \"STALA\", \"DRVVTRATIO\", \"DRVVT\", \"STACLOT\", \"CARIGG\", \"Q2VK2MUDHK\", \"X4BM6KHWBJ\", \"RA\", \"HAVIGM\", \"HBCIGM\", \"HCVAB\", \"HBSAG\", \"HBCAB\", \"HBVDNAINTERP\", \"ANAS\", \"C3\", \"C4\" in the last 168 hours.    Lisandra Long MD  [facility], Division of Vascular Surgery  1/29/2025  9:25 PM

## 2025-01-30 NOTE — PHYSICAL THERAPY NOTE
PHYSICAL THERAPY TREATMENT NOTE - INPATIENT     Room Number: 217/217-A       Presenting Problem: cerebellar CVA  Co-Morbidities : COPD, diabetes, HTN, RA, GI bleed    Problem List  Principal Problem:    Cerebellar stroke (HCC)  Active Problems:    AVM (arteriovenous malformation) of small bowel, acquired with hemorrhage    Dizziness    Right hand paresthesia    Thrombus of aorta (HCC)    Bilateral pulmonary embolism (HCC)    Acute cystitis without hematuria    Small bowel bleed not requiring more than 4 units of blood in 24 hours, ICU, or surgery    Acute blood loss anemia    Cerebrovascular accident (CVA) due to embolism of precerebral artery (HCC)    Melena    Visual hallucination      PHYSICAL THERAPY ASSESSMENT   Patient demonstrates fair progress this session, goals  remain in progress.      Patient is requiring minimal assist as a result of the following impairments: decreased endurance/aerobic capacity and impaired dynamic balance.     Patient continues to function below baseline with bed mobility, transfers, and gait.  Next session anticipate patient to progress bed mobility, transfers, and gait.  Physical Therapy will continue to follow patient for duration of hospitalization.    Patient continues to benefit from continued skilled PT services: to facilitate return to prior level of function as patient demonstrates high motivation with excellent tolerance to an intensive therapy program .    PLAN DURING HOSPITALIZATION  Nursing Mobility Recommendation : 1 Assist  PT Device Recommendation: Rolling walker;Gait belt  PT Treatment Plan: Bed mobility;Patient education;Gait training;Transfer training  Frequency (Obs): 5x/week     SUBJECTIVE  \"I keep seeing things out the side of my vision\"    OBJECTIVE  Precautions: Bed/chair alarm    WEIGHT BEARING RESTRICTION       PAIN ASSESSMENT   Ratin  Location: denies  Management Techniques: Activity promotion;Body mechanics;Repositioning (gentle/light soft tissue  mobilization)    BALANCE  Static Sitting: Fair  Dynamic Sitting: Fair  Static Standing: Fair -  Dynamic Standing: Fair -    ACTIVITY TOLERANCE                          O2 WALK       AM-PAC '6-Clicks' INPATIENT SHORT FORM - BASIC MOBILITY  How much difficulty does the patient currently have...  Patient Difficulty: Turning over in bed (including adjusting bedclothes, sheets and blankets)?: A Little   Patient Difficulty: Sitting down on and standing up from a chair with arms (e.g., wheelchair, bedside commode, etc.): A Little   Patient Difficulty: Moving from lying on back to sitting on the side of the bed?: A Little   How much help from another person does the patient currently need...   Help from Another: Moving to and from a bed to a chair (including a wheelchair)?: A Little   Help from Another: Need to walk in hospital room?: A Little   Help from Another: Climbing 3-5 steps with a railing?: A Little     AM-PAC Score:  Raw Score: 18   Approx Degree of Impairment: 46.58%   Standardized Score (AM-PAC Scale): 43.63   CMS Modifier (G-Code): CK    FUNCTIONAL ABILITY STATUS  Functional Mobility/Gait Assessment  Gait Assistance: Minimum assistance  Distance (ft): 55  Assistive Device: Rolling walker  Pattern:  (Slow lela, unsteady, trouble navigating walker)  Rolling: stand-by assist  Supine to Sit: contact guard assist  Sit to Supine: minimal assist  Sit to Stand: minimal assist    Skilled Therapy Provided: gait training for increased step length    The patient's Approx Degree of Impairment: 46.58% has been calculated based on documentation in the [facility] '6 clicks' Inpatient Daily Activity Short Form.  Research supports that patients with this level of impairment may benefit from HH.  Final disposition will be made by interdisciplinary medical team.    THERAPEUTIC EXERCISES  Lower Extremity Alternating marching  Ankle pumps  LAQ     Position Sitting  10xea     Patient End of Session: Up in chair    CURRENT GOALS   Goals  to be met by: 2/3/25 *date updated*  Patient Goal Patient's self-stated goal is: go home, be stronger   Goal #1 Patient is able to demonstrate supine - sit EOB @ level: modified independent      Goal #1   Current Status Not met   Goal #2 Patient is able to demonstrate transfers Sit to/from Stand at assistance level: modified independent with walker - rolling      Goal #2  Current Status Not met   Goal #3 Patient is able to ambulate 150 feet with assist device: walker - rolling at assistance level: supervision   Goal #3   Current Status Not met min A   Goal #4 Patient will negotiate 10 stairs with rail and supervision   Goal #4   Current Status Not tested   Goal #5 Patient to demonstrate independence with home activity/exercise instructions provided to patient in preparation for discharge.   Goal #5   Current Status progressing     Gait: 11min  Therapeutic Activity: 13 minutes

## 2025-01-30 NOTE — OCCUPATIONAL THERAPY NOTE
OCCUPATIONAL THERAPY TREATMENT NOTE - INPATIENT        Room Number: 217/217-A     Presenting Problem: CVA    Problem List  Principal Problem:    Cerebellar stroke (HCC)  Active Problems:    AVM (arteriovenous malformation) of small bowel, acquired with hemorrhage    Dizziness    Right hand paresthesia    Thrombus of aorta (HCC)    Bilateral pulmonary embolism (HCC)    Acute cystitis without hematuria    Small bowel bleed not requiring more than 4 units of blood in 24 hours, ICU, or surgery    Acute blood loss anemia    Cerebrovascular accident (CVA) due to embolism of precerebral artery (HCC)    Melena    Visual hallucination      OCCUPATIONAL THERAPY ASSESSMENT   Patient demonstrates good  progress this session, goals remain in progress.    Patient is requiring minimal assist as a result of the following impairments: decreased functional strength, decreased endurance, impaired dynamic balance, and visual deficits.    Patient continues to function below baseline with adls and functional mobility.  Next session anticipate patient to progress dynamic standing balance, functional standing tolerance, and visual scanning .  Occupational Therapy will continue to follow patient for duration of hospitalization.    Patient continues to benefit from continued skilled OT services: to facilitate return to prior level of function as patient demonstrates high motivation with excellent tolerance to an intensive therapy program.     PLAN DURING HOSPITALIZATION     OT Treatment Plan: Compensatory technique education;Patient/Family training;Patient/Family education;Cognitive reorientation;Endurance training;Functional transfer training;ADL training     SUBJECTIVE  \"I don't want my kids to have to take care of me\"    OBJECTIVE  Precautions: Bed/chair alarm    WEIGHT BEARING RESTRICTION     PAIN ASSESSMENT  Ratin    ACTIVITY TOLERANCE  good    O2 SATURATIONS  Activity on room air    ACTIVITIES OF DAILY LIVING ASSESSMENT  AM-PAC  ‘6-Clicks’ Inpatient Daily Activity Short Form  How much help from another person does the patient currently need…  -   Putting on and taking off regular lower body clothing?: A Little  -   Bathing (including washing, rinsing, drying)?: A Lot  -   Toileting, which includes using toilet, bedpan or urinal? : A Lot  -   Putting on and taking off regular upper body clothing?: A Lot  -   Taking care of personal grooming such as brushing teeth?: A Little  -   Eating meals?: A Little    AM-PAC Score:  Score: 15  Approx Degree of Impairment: 56.46%  Standardized Score (AM-PAC Scale): 34.69  CMS Modifier (G-Code): CK    FUNCTIONAL ADL ASSESSMENT  Eating: setup assist  Grooming: min assist  UB Dressing: min assist  LB Dressing: min assist  Toileting: na    Skilled Therapy Provided:  RN contacted prior to start of care. Treatment coordinated w/ PT. Pt agreeable to participation in therapy. Gait belt used during dynamic activity. Pt received in bed alert and oriented;daughter and son in law at bedside. Pt currently requires cga to transfer supine<>sit at eob. Pt maintained unsupported sitting w/ supervision. Pt required min a to fior briefs in preparation for oob activity. Pt maintained static standing w/ cga while assisting w/ clothing management. Pt ambulating in the nazario w/ rw and min a. Pt appeared to be scanning surroundings better w/ improved attention to left, but pt reported that she still \"sees things\" that aren't there/ Pt able to read and recall numbers this session. Pt able to write her daughters and son in law's name, but then became perseverative w/ one name and unable to write her own. Pt required min/set up assist w/ lunch tray, attempting to grasp her sandwich through the plastic wrap. Once set up pt was able to successfully grasp her sandwich to eat     At end of session pt remaining up in chair w/ all needs in reach and alarm on;daughter at bedside. RN aware of pt's status and performance in  therapy      EDUCATION PROVIDED  Patient Education : Plan of Care; Functional Transfer Techniques; Fall Prevention  Patient's Response to Education: Requires Further Education  Family/Caregiver's Response to Education: Verbalized Understanding    The patient's Approx Degree of Impairment: 56.46% has been calculated based on documentation in the [facility] '6 clicks' Inpatient Daily Activity Short Form.  Research supports that patients with this level of impairment may benefit from IRF.  Final disposition will be made by interdisciplinary medical team.    Patient End of Session: Up in chair;Needs met;Call light within reach;RN aware of session/findings;All patient questions and concerns addressed;Alarm set;Family present    OT Goals:   Patient will complete functional transfer with mod I least restrictive Assistive device    Comment: Pt required min a    Patient will complete toileting with mod I  Comment: na    Patient will tolerate standing for 5 minutes in prep for adls with mod I   Comment:Pt maintained static standing w/rw and cga ~ 1 min    Patient will complete LB dressing mod I   Comment:pt required min a          Goals  on: 2/3/25  Frequency: 5x/week    Therapeutic Activity: 23 minutes

## 2025-01-30 NOTE — PROGRESS NOTES
[facility]  [address], [address]  [facility] [address]  [phone]          INPATIENT STROKE NEUROLOGY   FOLLOW UP PROGRESS NOTE  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  Patient also found to have new subsegmental PEs on admission.    She has had increasing burden of stroke throughout her hospital stay.  Initially unable to anticoagulate because of her longstanding history of GI bleeding due to numerous AVMs.  Ultimately decided to start heparin drip on 2025 with PTT goal of 60-90 no boluses.  Also on antiplatelet therapy for left vertebral artery stent.    Initially she had left-sided posterior circulation strokes in her cerebellum on admission and a new left thalamic stroke on  now status post vertebral artery stenting on  C/B acute blood loss anemia requiring 1 unit of PRBCs.  Then had bihemispheric embolic strokes on 2025.    Per prior discussions with GI service patient is at high risk of recurrent bleeding due to her AVMs and the potential harms of anticoagulation would outweigh the potential benefits.   Therefore she was started on Plavix and then switched to Brilinta.    After her vertebral artery stenting the she complained of a headache with migrainous features.  She had a stat head CT which demonstrated contrast exacerbation versus new subarachnoid hemorrhage.  She had a repeat head CT 6 hours later which demonstrated continued improvement of her subarachnoid hemorrhage, and her repeat head CT at 6 AM on 1/25/2025 demonstrate near resolution of the contrast/subarachnoid blood.  Patient was maintained on her antiplatelets.  Repeat exam on the morning of 1/25/2025 demonstrated new global aphasia, and a questionable right visual field deficit.  She had a repeat stat head CT and CTA head and neck that did not demonstrate any interval any interval large vessel occlusion.  However, her repeat MRI of the brain demonstrates interval infarcts in her left parietal/occipital region, left posterior temporal lobe, small medial occipital lobe infarct,   L>R frontal lobe/motor cortex, and left frontal lobe. She has infarct in her anterior and posterior circulation.    Her exam   improved on 1/26/2025 however she still has persistent right hemianopsia and R LE > U UE weakness.    Restarted her Brilinta 90 mg twice daily on 1/26/2025.  Reassessment 1/27/2025; patient had a new oxygen requirement.  She was now on nasal cannula.  Discussed with critical care service who recommended stat lower extremity Dopplers and concurred that she would benefit from low intensity anticoagulation.    1/28/2025 her heparin drip with PTT goal of 60-90 was started.  There was no bolus.  Antiplatelets changed from Brilinta 90 mg twice daily to aspirin 81 mg daily.  Anticoagulation will tentatively be held on 1/29/2025 for GI procedure.  On the afternoon of the 28th patient complained of 5 out of 10 headache with dizziness and nausea.  Reported the dizziness was new.  Heparin drip was held.  She had a stat head CT that was negative for any acute  hemorrhage.  Her dizziness is improved and headache resolved spontaneously.    She was not a candidate for tPA because she is out of the time window and because of her history of recurrent GI bleeds and AVMs.  Not can for thrombectomy because no LVO.    ON  01/29/25 she is stable; endorses well formed visual hallucinations.  She  has insight into  her sx.    She has been stable. Consider transfer to the floor on 1/30/2025.    Overall main plan is to continue the patient on heparin drip and monitor for any signs of bleeding.  Will need to determine what her long-term anticoagulation will be.     Recurrent strokes due to chronic descending aorta mural thrombus in the setting of  chronic anemia due to numerous GI AVMs  Differential Diagnosis for stroke/TIA mechanism:  Embolic due to mural thrombus in her aorta; will need to determine why she has such extensive thrombosis of her aorta.  May be related to atherosclerotic disease.       Plan    Diagnostics:  Imaging: MRI brain WO, CT brain WO, and CTA head/neck  ; stat CTA head head and neck if there exam worsens again.  Repeat head CT on 1/28/2025 for new onset headache while on heparin drip was stable.  Cardiac imaging: Telemetry and TTE    Labs:  trend hemoglobin  and PTTs  Therapeutics:  Blood pressure goal: SBP Less  160  Please avoid blood pressure variability. wide fluctuations in BP can lead to stroke expansion.    PRN hydralazine and labetalol for sustained pressures outside of goal  Neuro checks Q2 while awake, q4 at night.  Telemetry.NIH stroke scale per protocol.  Blood glucose goal: .  Accuchecks Q6 if patient has DM  closely monitor to prevent hypoglycemia in patients with AIS.  Antithrombotics: is for low intensity anticoagulation with a PTT goal of 60-90 and no boluses starting on 1/28/2025.  Lipid-lowering agents: Cholesterol Meds: rosuvastatin - 10 MG; rosuvastatin Tabs - 20 MG     Avoid hypotonic fluids as this can worsen cerebral edema.  Physical  therapy, Occupational Therapy, speech therapy evaluations ordered.  maintain oxygen saturation >94%. No supplemental oxygen  in nonhypoxic patients with acute ischemic stroke (AIS).  Tx hyperthermia (temperature >38°C) and identify source  STAT head CT and page neurology if any change in neurological exam or focal deficits.    Long term secondary stroke prevention goals:  Home BP monitoring. Pt instructed to check BP at home in the AM and PM, and bring values to future clinic visits. BP goal is for normotension, < 130/80.  HbA1c goal <7.0  LDL-C goal  <70 mg/dL.  Target total cholesterol < 200 mg/dL Target HDL >45 mg/dL for men, >55 mg/dL for women, Target triglycerides <150 mg/dL  Physical inactivity - target exercise at least 3 times per week of moderate intensity activity for 20 minutes.  Obesity - target ideal body weight and girth <35\" for women, <40\" for men  Smoking - Target smoking cessation    2. Well formed visual hallucinations  Ddx:  Chester Bonnet syndrome  Neurodegenerative d/o ie posterior cortical atrophy/phelps's (doubt, she can read) or dementia w/ lewy bodies  Occipital lobe seizures; doubt         INTERVAL EVENTS  01/25/25: Neurological exam was worse this morning.  Patient was aphasic.  She could only say \"1, 2, and yes.\"  ?Left gaze preference but could look to the right when asked specifically.  Questionable incongruent right hemianopsia..  Followed some axial and appendicular commands.  Repeat CTA for aphasia.  Repeat MRI demonstrated new infarct.  New motor deficits noted by RN at approximately 4:30 PM.  01/26/25  1:40 AM -acutely agitated.  Reported acute plan.  Complaint of significant visual field deficits.  Noted to have worsening drift in right upper extremity.  Neurological exam improved compared to 1/25/2025.  Restarted Brilinta 90 mg twice daily  01/27/25: New oxygen requirement.  Patient has nasal cannula in nares.  Reports of orthopnea which is new.  Repeat Dopplers  ordered.  25:her heparin drip with PTT goal of 60-90 was started.  There was no bolus.  Antiplatelets changed from Brilinta 90 mg twice daily to aspirin 81 mg daily.    25: stable          SUBJECTIVE:   Patient was seen laying in bed.   Discussed her well formed visual hallucinations and recovery after stroke.      Pertinent positive and negatives per HPI.  All others were reviewed and negative.       Objective   OBJECTIVE:   Last vitals and weight :  Blood pressure 143/72, pulse 83, temperature 98.2 °F (36.8 °C), temperature source Temporal, resp. rate 23, height 61\", weight 135 lb 9.6 oz (61.5 kg), SpO2 98%.   Vitals:    25 1400 25 1503 25 1530 25 1545   BP: 160/70 101/86 (!) 162/82 143/72   BP Location: Right leg  Left arm Left arm   Pulse: 88  92 83   Resp: 21  16 23   Temp:       TempSrc:       SpO2: 92%  100% 98%   Weight:       Height:          Exam:  - General: appears older than stated age and no distress  - CV: Symmetric pulses.   Carotids:   - Pulmonary: no overt sign of respiratory distress.    Neurologic Exam  - Mental Status: She is awake and alert.  She is pleasant and cooperative.  She has insight into her deficits.  She is able to name.  She is able to repeat.  She is oriented to self.  She knew her location, the month and the year.  +expressive aphasia. Can't produce speech to tell me her age or , which bothers her. She can read.   - Cranial Nerves: No gaze preference.  Visual fields: Incongruent right hemianopsia.  Pupils are 4mm briskly constricting to 3mm and equally round and reactive to light  in a well lit room. EOMI. No nystagmus. No ptosis. V1-V3 intact B/L to light touch.No pathological facial asymmetry. No flattening of the nasolabial fold. .  Hearing grossly intact.  Tongue midline. No atrophy or fasiculations of the tongue noted. Palate and uvula elevate symmetrically.  Shoulder shrug symmetric.    - Motor:   diffusely Decreased bulk.    Pronator  drift: No pronator drift .  Keep both arms elevated antigravity for 10 seconds.  She does have weakness in her right arm and has difficulty with finger-nose in her right arm because of her weakness.    Leg Drift: Able to keep left leg antigravity for 5 seconds.  Right leg drift.  - Sensory:   Light touch: normal  - Cerebellum: No truncal ataxia. No titubations. No dysmetria.         Medications:   aspirin  81 mg Oral Daily    carvedilol  25 mg Oral BID with meals    lidocaine-menthol  1 patch Transdermal Daily    midazolam  2 mg Intravenous Once    octreoTIDe  50 mcg Subcutaneous Q8H    pantoprazole  40 mg Intravenous Q12H    rosuvastatin  10 mg Oral Nightly    sennosides  8.6 mg Oral BID    cefepime  1 g Intravenous Q24H    cetirizine  5 mg Oral Daily    docusate sodium  100 mg Oral BID    fluticasone-salmeterol  1 puff Inhalation 2 times per day    gabapentin  100 mg Oral Nightly    montelukast  10 mg Oral Nightly    predniSONE  5 mg Oral Daily    losartan  50 mg Oral Daily    hydroxychloroquine  300 mg Oral Daily    insulin aspart  1-11 Units Subcutaneous TID CC    insulin degludec  10 Units Subcutaneous Nightly       PRNS:   magnesium hydroxide    bisacodyl    fleet enema    labetalol    glucose **OR** glucose **OR** glucose-vitamin C **OR** dextrose **OR** glucose **OR** glucose **OR** glucose-vitamin C    HYDROmorphone    acetaminophen    meclizine    acetaminophen **OR** acetaminophen    hydrALAzine    ondansetron    metoclopramide    acetaminophen    ondansetron    albuterol    cyclobenzaprine    oxyCODONE    Infusions:    [Held by provider] continuous dose heparin Stopped (01/29/25 9472)    sodium chloride Stopped (01/23/25 1630)          Results:   Laboratory Data:  Lab Results   Component Value Date    WBC 12.5 (H) 01/29/2025    WBC 12.5 (H) 01/29/2025    HGB 7.2 (L) 01/29/2025    HGB 7.2 (L) 01/29/2025    HCT 23.7 (L) 01/29/2025    HCT 23.7 (L) 01/29/2025    .0 01/29/2025    .0 01/29/2025     CREATSERUM 1.03 (H) 01/29/2025    CREATSERUM 1.03 (H) 01/29/2025    BUN 11 01/29/2025    BUN 11 01/29/2025     01/29/2025     01/29/2025    K 4.0 01/29/2025    K 4.0 01/29/2025     01/29/2025     01/29/2025    CO2 23.0 01/29/2025    CO2 23.0 01/29/2025     (H) 01/29/2025     (H) 01/29/2025    CA 9.5 01/29/2025    CA 9.5 01/29/2025    ALB 3.2 01/29/2025    ALKPHO 77 01/29/2025    TP 6.5 01/29/2025    AST 21 01/29/2025    ALT <7 (L) 01/29/2025    .1 (H) 01/29/2025    INR 1.04 01/25/2025    PTP 14.3 01/25/2025    T4F 1.2 01/17/2025    TSH 0.418 (L) 01/17/2025    DDIMER 2.77 (H) 01/25/2025    ESRML >130 (H) 11/29/2024    CRP 3.00 (H) 11/29/2024    MG 1.6 01/29/2025    PHOS 4.0 01/22/2025    B12 814 11/01/2024     Recent Results (from the past 72 hours)   POCT Glucose    Collection Time: 01/26/25  6:44 PM   Result Value Ref Range    POC Glucose  265 (H) 70 - 99 mg/dL   POCT Glucose    Collection Time: 01/26/25  8:56 PM   Result Value Ref Range    POC Glucose  248 (H) 70 - 99 mg/dL   Hemoglobin & Hematocrit    Collection Time: 01/27/25 12:31 AM   Result Value Ref Range    HGB 7.6 (L) 12.0 - 16.0 g/dL    HCT 23.3 (L) 35.0 - 48.0 %   Magnesium    Collection Time: 01/27/25  5:14 AM   Result Value Ref Range    Magnesium 1.5 (L) 1.6 - 2.6 mg/dL   Comp Metabolic Panel (14)    Collection Time: 01/27/25  5:14 AM   Result Value Ref Range    Glucose 252 (H) 70 - 99 mg/dL    Sodium 143 136 - 145 mmol/L    Potassium 3.5 3.5 - 5.1 mmol/L    Chloride 111 98 - 112 mmol/L    CO2 23.0 21.0 - 32.0 mmol/L    Anion Gap 9 0 - 18 mmol/L    BUN 8 (L) 9 - 23 mg/dL    Creatinine 0.89 0.55 - 1.02 mg/dL    BUN/CREA Ratio 9.0 (L) 10.0 - 20.0    Calcium, Total 9.1 8.7 - 10.4 mg/dL    Calculated Osmolality 303 (H) 275 - 295 mOsm/kg    eGFR-Cr 63 >=60 mL/min/1.73m2    ALT <7 (L) 10 - 49 U/L    AST 20 <34 U/L    Alkaline Phosphatase 76 55 - 142 U/L    Bilirubin, Total 0.4 0.2 - 1.1 mg/dL    Total Protein  6.3 5.7 - 8.2 g/dL    Albumin 3.2 3.2 - 4.8 g/dL    Globulin  3.1 2.0 - 3.5 g/dL    A/G Ratio 1.0 1.0 - 2.0   CBC With Differential With Platelet    Collection Time: 01/27/25  5:14 AM   Result Value Ref Range    WBC 17.2 (H) 4.0 - 11.0 x10(3) uL    RBC 3.00 (L) 3.80 - 5.30 x10(6)uL    HGB 7.4 (L) 12.0 - 16.0 g/dL    HCT 24.3 (L) 35.0 - 48.0 %    MCV 81.0 80.0 - 100.0 fL    MCH 24.7 (L) 26.0 - 34.0 pg    MCHC 30.5 (L) 31.0 - 37.0 g/dL    RDW-SD 49.1 (H) 35.1 - 46.3 fL    RDW 16.8 (H) 11.0 - 15.0 %    .0 150.0 - 450.0 10(3)uL    Neutrophil Absolute Prelim 13.37 (H) 1.50 - 7.70 x10 (3) uL    Neutrophil Absolute 13.37 (H) 1.50 - 7.70 x10(3) uL    Lymphocyte Absolute 1.65 1.00 - 4.00 x10(3) uL    Monocyte Absolute 1.52 (H) 0.10 - 1.00 x10(3) uL    Eosinophil Absolute 0.50 0.00 - 0.70 x10(3) uL    Basophil Absolute 0.06 0.00 - 0.20 x10(3) uL    Immature Granulocyte Absolute 0.11 0.00 - 1.00 x10(3) uL    Neutrophil % 77.8 %    Lymphocyte % 9.6 %    Monocyte % 8.8 %    Eosinophil % 2.9 %    Basophil % 0.3 %    Immature Granulocyte % 0.6 %   Potassium    Collection Time: 01/27/25  5:14 AM   Result Value Ref Range    Potassium 3.5 3.5 - 5.1 mmol/L   Prepare RBC Once    Collection Time: 01/27/25  6:53 AM   Result Value Ref Range    Blood Product [id number]     Unit Number [id number]     UNIT ABO B     UNIT RH POS     Product Status Released from Crossmatch     Expiration Date [id number]     Blood Type Barcode 7300     Unit Volume 350 ml   POCT Glucose    Collection Time: 01/27/25  9:55 AM   Result Value Ref Range    POC Glucose  263 (H) 70 - 99 mg/dL   Hemoglobin & Hematocrit    Collection Time: 01/27/25 10:09 AM   Result Value Ref Range    HGB 7.8 (L) 12.0 - 16.0 g/dL    HCT 25.0 (L) 35.0 - 48.0 %   POCT Glucose    Collection Time: 01/27/25  5:33 PM   Result Value Ref Range    POC Glucose  275 (H) 70 - 99 mg/dL   POCT Glucose    Collection Time: 01/27/25  8:40 PM   Result Value Ref Range    POC Glucose  207 (H) 70  - 99 mg/dL   Potassium    Collection Time: 01/27/25 10:54 PM   Result Value Ref Range    Potassium 4.0 3.5 - 5.1 mmol/L   CBC With Differential With Platelet    Collection Time: 01/28/25  5:36 AM   Result Value Ref Range    WBC 13.3 (H) 4.0 - 11.0 x10(3) uL    RBC 2.96 (L) 3.80 - 5.30 x10(6)uL    HGB 7.4 (L) 12.0 - 16.0 g/dL    HCT 23.4 (L) 35.0 - 48.0 %    MCV 79.1 (L) 80.0 - 100.0 fL    MCH 25.0 (L) 26.0 - 34.0 pg    MCHC 31.6 31.0 - 37.0 g/dL    RDW-SD 49.2 (H) 35.1 - 46.3 fL    RDW 17.2 (H) 11.0 - 15.0 %    .0 150.0 - 450.0 10(3)uL    Neutrophil Absolute Prelim 9.39 (H) 1.50 - 7.70 x10 (3) uL    Neutrophil Absolute 9.39 (H) 1.50 - 7.70 x10(3) uL    Lymphocyte Absolute 2.05 1.00 - 4.00 x10(3) uL    Monocyte Absolute 1.25 (H) 0.10 - 1.00 x10(3) uL    Eosinophil Absolute 0.45 0.00 - 0.70 x10(3) uL    Basophil Absolute 0.06 0.00 - 0.20 x10(3) uL    Immature Granulocyte Absolute 0.10 0.00 - 1.00 x10(3) uL    Neutrophil % 70.5 %    Lymphocyte % 15.4 %    Monocyte % 9.4 %    Eosinophil % 3.4 %    Basophil % 0.5 %    Immature Granulocyte % 0.8 %   Magnesium    Collection Time: 01/28/25  5:59 AM   Result Value Ref Range    Magnesium 1.6 1.6 - 2.6 mg/dL   Comp Metabolic Panel (14)    Collection Time: 01/28/25  5:59 AM   Result Value Ref Range    Glucose 151 (H) 70 - 99 mg/dL    Sodium 143 136 - 145 mmol/L    Potassium 3.6 3.5 - 5.1 mmol/L    Chloride 111 98 - 112 mmol/L    CO2 23.0 21.0 - 32.0 mmol/L    Anion Gap 9 0 - 18 mmol/L    BUN 8 (L) 9 - 23 mg/dL    Creatinine 0.95 0.55 - 1.02 mg/dL    BUN/CREA Ratio 8.4 (L) 10.0 - 20.0    Calcium, Total 9.5 8.7 - 10.4 mg/dL    Calculated Osmolality 297 (H) 275 - 295 mOsm/kg    eGFR-Cr 59 (L) >=60 mL/min/1.73m2    ALT <7 (L) 10 - 49 U/L    AST 22 <34 U/L    Alkaline Phosphatase 80 55 - 142 U/L    Bilirubin, Total 0.5 0.2 - 1.1 mg/dL    Total Protein 6.6 5.7 - 8.2 g/dL    Albumin 3.3 3.2 - 4.8 g/dL    Globulin  3.3 2.0 - 3.5 g/dL    A/G Ratio 1.0 1.0 - 2.0   Prepare fresh  frozen plasma Once    Collection Time: 01/28/25  6:53 AM   Result Value Ref Range    Blood Product [id number]     Unit Number [id number]     UNIT ABO B     UNIT RH POS     Product Status Released from Crossmatch     Expiration Date [id number]     Blood Type Barcode 7300     Unit Volume 316 ml   PTT, Activated    Collection Time: 01/28/25  9:47 AM   Result Value Ref Range    PTT 32.1 23.0 - 36.0 seconds   POCT Glucose    Collection Time: 01/28/25 10:44 AM   Result Value Ref Range    POC Glucose  137 (H) 70 - 99 mg/dL   PTT, Activated    Collection Time: 01/28/25  1:59 PM   Result Value Ref Range    PTT 58.8 (H) 23.0 - 36.0 seconds   POCT Glucose    Collection Time: 01/28/25  5:53 PM   Result Value Ref Range    POC Glucose  239 (H) 70 - 99 mg/dL   Potassium    Collection Time: 01/28/25  6:08 PM   Result Value Ref Range    Potassium 4.9 3.5 - 5.1 mmol/L   POCT Glucose    Collection Time: 01/28/25  8:46 PM   Result Value Ref Range    POC Glucose  172 (H) 70 - 99 mg/dL   PTT, Activated    Collection Time: 01/28/25  9:26 PM   Result Value Ref Range    .7 (H) 23.0 - 36.0 seconds   Magnesium    Collection Time: 01/29/25  5:01 AM   Result Value Ref Range    Magnesium 1.6 1.6 - 2.6 mg/dL   Basic Metabolic Panel (8)    Collection Time: 01/29/25  5:01 AM   Result Value Ref Range    Glucose 155 (H) 70 - 99 mg/dL    Sodium 142 136 - 145 mmol/L    Potassium 4.0 3.5 - 5.1 mmol/L    Chloride 110 98 - 112 mmol/L    CO2 23.0 21.0 - 32.0 mmol/L    Anion Gap 9 0 - 18 mmol/L    BUN 11 9 - 23 mg/dL    Creatinine 1.03 (H) 0.55 - 1.02 mg/dL    BUN/CREA Ratio 10.7 10.0 - 20.0    Calcium, Total 9.5 8.7 - 10.4 mg/dL    Calculated Osmolality 297 (H) 275 - 295 mOsm/kg    eGFR-Cr 53 (L) >=60 mL/min/1.73m2   CBC, Platelet; No Differential    Collection Time: 01/29/25  5:01 AM   Result Value Ref Range    WBC 12.5 (H) 4.0 - 11.0 x10(3) uL    RBC 2.90 (L) 3.80 - 5.30 x10(6)uL    HGB 7.2 (L) 12.0 - 16.0 g/dL    HCT 23.7 (L) 35.0 - 48.0 %     MCV 81.7 80.0 - 100.0 fL    MCH 24.8 (L) 26.0 - 34.0 pg    MCHC 30.4 (L) 31.0 - 37.0 g/dL    RDW 17.4 (H) 11.0 - 15.0 %    RDW-SD 51.9 (H) 35.1 - 46.3 fL    .0 150.0 - 450.0 10(3)uL   Comp Metabolic Panel (14)    Collection Time: 01/29/25  5:01 AM   Result Value Ref Range    Glucose 155 (H) 70 - 99 mg/dL    Sodium 142 136 - 145 mmol/L    Potassium 4.0 3.5 - 5.1 mmol/L    Chloride 110 98 - 112 mmol/L    CO2 23.0 21.0 - 32.0 mmol/L    Anion Gap 9 0 - 18 mmol/L    BUN 11 9 - 23 mg/dL    Creatinine 1.03 (H) 0.55 - 1.02 mg/dL    BUN/CREA Ratio 10.7 10.0 - 20.0    Calcium, Total 9.5 8.7 - 10.4 mg/dL    Calculated Osmolality 297 (H) 275 - 295 mOsm/kg    eGFR-Cr 53 (L) >=60 mL/min/1.73m2    ALT <7 (L) 10 - 49 U/L    AST 21 <34 U/L    Alkaline Phosphatase 77 55 - 142 U/L    Bilirubin, Total 0.4 0.2 - 1.1 mg/dL    Total Protein 6.5 5.7 - 8.2 g/dL    Albumin 3.2 3.2 - 4.8 g/dL    Globulin  3.3 2.0 - 3.5 g/dL    A/G Ratio 1.0 1.0 - 2.0   CBC With Differential With Platelet    Collection Time: 01/29/25  5:01 AM   Result Value Ref Range    WBC 12.5 (H) 4.0 - 11.0 x10(3) uL    RBC 2.90 (L) 3.80 - 5.30 x10(6)uL    HGB 7.2 (L) 12.0 - 16.0 g/dL    HCT 23.7 (L) 35.0 - 48.0 %    MCV 81.7 80.0 - 100.0 fL    MCH 24.8 (L) 26.0 - 34.0 pg    MCHC 30.4 (L) 31.0 - 37.0 g/dL    RDW-SD 51.9 (H) 35.1 - 46.3 fL    RDW 17.4 (H) 11.0 - 15.0 %    .0 150.0 - 450.0 10(3)uL    Neutrophil Absolute Prelim 8.54 (H) 1.50 - 7.70 x10 (3) uL    Neutrophil Absolute 8.54 (H) 1.50 - 7.70 x10(3) uL    Lymphocyte Absolute 2.25 1.00 - 4.00 x10(3) uL    Monocyte Absolute 1.00 0.10 - 1.00 x10(3) uL    Eosinophil Absolute 0.53 0.00 - 0.70 x10(3) uL    Basophil Absolute 0.06 0.00 - 0.20 x10(3) uL    Immature Granulocyte Absolute 0.09 0.00 - 1.00 x10(3) uL    Neutrophil % 68.5 %    Lymphocyte % 18.0 %    Monocyte % 8.0 %    Eosinophil % 4.3 %    Basophil % 0.5 %    Immature Granulocyte % 0.7 %   PTT, Activated    Collection Time: 01/29/25  5:01 AM    Result Value Ref Range    .1 (H) 23.0 - 36.0 seconds   POCT Glucose    Collection Time: 01/29/25  6:26 AM   Result Value Ref Range    POC Glucose  141 (H) 70 - 99 mg/dL   POCT Glucose    Collection Time: 01/29/25  9:20 AM   Result Value Ref Range    POC Glucose  108 (H) 70 - 99 mg/dL   POCT Glucose    Collection Time: 01/29/25 11:50 AM   Result Value Ref Range    POC Glucose  123 (H) 70 - 99 mg/dL            Last A1c was done on 1/18/2025.       Test results/Imaging:   XR CHEST AP PORTABLE  (CPT=71045)    Result Date: 1/29/2025  CONCLUSION:  1. Cardiomegaly.  Tortuous atherosclerotic aorta. 2. Extensive bilateral mixed alveolar and interstitial multifocal airspace opacification with interval progression in the upper lobes.    Dictated by (CST): Louis oMreno MD on 1/29/2025 at 9:03 AM     Finalized by (CST): Louis Moreno MD on 1/29/2025 at 9:06 AM          CT BRAIN OR HEAD (CPT=70450)    Result Date: 1/28/2025  CONCLUSION:  1.  Findings compatible with recent/subacute infarcts within the bilateral temporal lobes, left parietal-occipital lobe junction, and scattered throughout the frontal lobe near the vertex.  These findings as well as other foci are better visualized on recent preceding MRI.  No hemorrhage. 2.  Post right frontal craniotomy.  Post vascular clip in the anterior cerebral artery distribution.    Dictated by (CST): Fermín Castro MD on 1/28/2025 at 5:23 PM     Finalized by (CST): Fermín Castro MD on 1/28/2025 at 5:26 PM          XR CHEST AP PORTABLE  (CPT=71045)    Result Date: 1/28/2025  CONCLUSION:  1. Cardiomegaly tortuous thoracic aorta 2. Bilateral mixed alveolar and interstitial multifocal airspace opacification with interval progression. 3. Bilateral central venous catheters with the tip in the distal SVC near the atrial junction.  No pneumothorax.  Left-sided Mediastinal vascular stent in place.     Dictated by (CST): Louis Moreno MD on 1/28/2025 at 8:25 AM      Finalized by (CST): Louis Moreno MD on 1/28/2025 at 8:29 AM          US VENOUS DOPPLER LEG BILAT - DIAG IMG (CPT=93970)    Result Date: 1/27/2025  CONCLUSION: No evidence of left or right lower extremity DVT.   Dictated by (CST): Fermín Castro MD on 1/27/2025 at 9:51 PM     Finalized by (CST): Fermín Castro MD on 1/27/2025 at 9:53 PM               CT BRAIN OR HEAD (CPT=70450)    Result Date: 1/28/2025  CONCLUSION:  1.  Findings compatible with recent/subacute infarcts within the bilateral temporal lobes, left parietal-occipital lobe junction, and scattered throughout the frontal lobe near the vertex.  These findings as well as other foci are better visualized on recent preceding MRI.  No hemorrhage. 2.  Post right frontal craniotomy.  Post vascular clip in the anterior cerebral artery distribution.    Dictated by (CST): Fermín Castro MD on 1/28/2025 at 5:23 PM     Finalized by (CST): Fermín Castro MD on 1/28/2025 at 5:26 PM          CT BRAIN OR HEAD (CPT=70450)    Result Date: 1/25/2025  CONCLUSION:  1. No intracranial hemorrhage. 2. Acute left parietal occipital cortical infarct and posterior temporal cortical infarct.  Other acute infarcts seen on the recent MRI are not detectable with certainty. 3. Right frontal craniotomy and para falcine aneurysm clipping.  New line large vessel intracranial atherosclerosis.    Dictated by (CST): Michael Gonzalez MD on 1/25/2025 at 7:43 PM     Finalized by (CST): Michael Gonzalez MD on 1/25/2025 at 7:48 PM          MRI BRAIN (CPT=70551)    Result Date: 1/25/2025  CONCLUSION:  1. Interval development of multiple acute infarcts with largest in the left parietal occipital region and left posterior temporal lobe..  Other smaller infarcts in the bilateral posterior frontal lobes, right paramedian posterior parietal lobe, right occipital lobe, left anterior frontal lobe. 2. Recent left thalamic lacunar infarct is unchanged.  3. One of recent cerebellar lacunar infarcts is much less  conspicuous and others no longer restrict diffusion.  Other chronic cerebellar lacunar infarcts. 4. Post right frontal craniotomy for aneurysm clipping.    Dictated by (CST): Michael Gonzalez MD on 1/25/2025 at 6:22 PM     Finalized by (CST): Michael Gonzalez MD on 1/25/2025 at 6:31 PM          CTA BRAIN + CTA CAROTIDS (CPT=70496/93192)    Result Date: 1/25/2025  CONCLUSION:  1. No large vessel occlusion, hemodynamically significant stenosis, dissection, AVM or aneurysm. 2. Wall 3. Right frontal craniotomy for aneurysm clipping. 4. Patent proximal left subclavian artery stent. 5. Changes of chronic small vessel disease in both cerebral hemispheres. 6. Irregular atherosclerotic plaque in the thoracic aorta. 7. Advanced emphysema with pleural parenchymal scarring.     Dictated by (CST): Michael Gonzalez MD on 1/25/2025 at 1:31 PM     Finalized by (CST): Michael Gonzalez MD on 1/25/2025 at 1:49 PM          CT BRAIN OR HEAD (CPT=70450)    Result Date: 1/24/2025  CONCLUSION:  1. Subarachnoid hemorrhage in the left frontal high convexity in bilateral occipital lobes with mild cerebral edema. Continued surveillance is recommended.  2. Postoperative changes of frontal craniotomy and anterior parafalcine aneurysm clipping.  3. Senescent changes of parenchymal volume loss with sequela of chronic microvascular ischemic disease.  4. There is large vessel atherosclerosis involving the anterior and posterior intracranial circulations.  5. Lesser incidental findings as above.      Results of this examination were conveyed to the patient's physician, Dr. Ronald March, by Dr. Gruber at 1838 on 01/24/2025.   Dictated by (CST): Sharath Gruber MD on 1/24/2025 at 6:35 PM     Finalized by (CST): Sharath Gruber MD on 1/24/2025 at 6:41 PM          CT BRAIN OR HEAD (CPT=70450)    Result Date: 1/24/2025  CONCLUSION:  1. Retained contrast material throughout the large intracranial vessels and cerebral sulci from same date cardiac angiogram.  Please note  that this finding limits evaluation for detection of potential acute intracranial hemorrhage.  Aforementioned sulcal  enhancement most likely relates to a contrast staining.  However, if symptoms or strong clinical suspicion for acute intracranial hemorrhage persist, a short interval 6-12 hour follow-up head CT is recommended. 2. Subcentimeter enhancing foci in the left cerebellum (x2) likely relate to known enhancing subacute lacunar type infarcts.  Continued follow-up to ensure resolution of enhancement is advised. 3. Midline frontal craniotomy and anterior parafalcine aneurysm clipping. 4. Intracranial atherosclerosis. 5. Stable chronic moderate adenoid enlargement.  elm-remote  Dictated by (CST): Jose Enrique Valente MD on 1/24/2025 at 11:33 AM     Finalized by (CST): Jose Enrique Valente MD on 1/24/2025 at 11:40 AM          MRI BRAIN (CPT=70551)    Result Date: 1/20/2025  CONCLUSION:  1. Interval development of a punctate lacunar infarct of the left thalamus, concerning for acute ischemia.  2. Subacute lacunar infarcts of the left cerebellar hemisphere.   3. Postoperative changes of frontal craniotomy and parafalcine aneurysm clipping.  4. Senescent changes of parenchymal volume loss with mild sequela of chronic microvascular ischemic disease.  5. Lesser incidental findings as above.      A preliminary report was issued by the [facility] teleradiology service. There are no major discrepancies.  elm-remote.   Dictated by (CST): Sharath Gruber MD on 1/20/2025 at 9:24 AM     Finalized by (CST): Sharath Gruber MD on 1/20/2025 at 9:31 AM          CT STROKE BRAIN (NO IV)(CPT=70450)    Result Date: 1/19/2025  CONCLUSION:  1. Small focus of hypoattenuation involving the left cerebellar hemisphere, which corresponds to the acute infarct demonstrated on the prior MRI brain dated 01/17/2025. 2. Otherwise, no transcortical area of hypoattenuation to suggest an acute infarct.  If there is clinical concern for an acute  infarct, consider further evaluation with an MRI of the brain. 3. No acute intracranial hemorrhage, midline shift, mass effect, or hydrocephalus. 4. Redemonstrated postoperative changes from prior frontal craniotomy for aneurysm clipping along the course of the anterior cerebral arteries.  The associated metallic artifact from the aneurysm clips limits evaluation of the adjacent structures. 5. Lesser incidental findings described above.    Impression points 1-3 were communicated via telephone to Leonidas SOW at 9:39 a.m. on 01/19/2025 by Joe Deleon MD  Dictated by (CST): Joe Deleon MD on 1/19/2025 at 9:34 AM     Finalized by (CST): Joe Deleon MD on 1/19/2025 at 9:41 AM          CT STROKE CTA BRAIN/CTA NECK (W IV)(CPT=70496/84846)    Result Date: 1/17/2025  CONCLUSION:  1. Negative for hemodynamically significant stenosis or occlusion of the anterior or posterior circulations.   2. The internal carotid arteries demonstrate tortuosity proximally without evidence of hemodynamically stenosis or occlusion.  3. The vertebral arteries demonstrate contiguous patency bilaterally without evidence of flow-limiting stenosis.  4. 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 Thrombus extends into the proximal left subclavian artery origin, contributing to luminal narrowing.  5. Dilatation of the main pulmonary artery trunk may relate to underlying pulmonary hypertension.  6. 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   7. Lesser incidental findings as above.   Dictated by (CST): Sharath Gruber MD on 1/17/2025 at 7:27 PM     Finalized by (CST): Sharath Gruber MD on 1/17/2025 at 7:36 PM          CT STROKE BRAIN (NO IV)(CPT=70450)    Result Date: 1/17/2025  CONCLUSION:  1. No acute intracranial hemorrhage or evidence of extended large vessel infarction. If there is clinical concern for acute ischemia, follow-up MRI suggested.   2. Postoperative changes of frontal craniotomy with  anterior parafalcine aneurysm clipping.  3. Senescent changes of parenchymal volume loss with sequela of chronic microvascular ischemic disease.  4. There is large vessel atherosclerosis of the anterior and posterior intracranial circulations.  5. Lesser incidental findings as above.    This report was called immediately at 1915 hours to Emergency Department Pod 3 and discussed with the patient's ER physician, Dr. Cottrell.    Dictated by (CST): Sharath Gruber MD on 1/17/2025 at 7:11 PM     Finalized by (CST): Sharath Gruber MD on 1/17/2025 at 7:15 PM           Performed an independent visualization of head CT, CTA head and neck, MRI brain from 1/25/2025.  Also reviewed recent imaging throughout the week.  Reviewed multiple head CTs and CTAs as well as prior MRIs. Head ct from 1/28/25  Imaging revealed: Agree with radiology read.    Education/Instructions given to: patient, daughter, granddaughter, daughter's   Barriers to Learning:None  Content: Refer to note above. Evaluation/Outcome: Verbalized understanding    Disclaimer:   This record was dictated using Dragon software. There may be errors due to voice recognition problems that were not realized and corrected during the completion of the note.      This document is not intended to support charting by exception.  Sections left blank in a completed note should be presumed not to have been done.      [id number]  Discussion with other consultants:  Yes  Discussion with patient and family: yes  Review of other notes:  yes   Obtain old records / summarize old records)  no  (New studies ordered)  * No order type specified *  (Code status review)  no  (IV controlled therapy)  no  (Drugs being monitored for toxicity)  Heparin drip      Have you reviewed, ordered or called for: NUMBER POINTS SCORE   Clinical laboratory test max = 1 1 1    Radiology test max = 1 1 1    Medical diagnostic test max = 1 1     Discussed the test results with referring or interpreting  physician any 1     Directly visualized imaging (CT, MRI), EEG tracing, or EMG data done by another physician (not just the report) any 2 2    Decision to obtained old records and/or obtain history from someone other than the patient any 1     Reviewed and summarized old records or history from sources other than the patient any 2         TOTAL:  4      Once the total points are summed, the proper level can be determined from the table below:  Level of Amount / Complexity of Data Total Points   Minimal 1 Point   Limited 2 Points   Moderate 3 Points   Extensive ³ 4 Points        Determination of the Level of Amount / Complexity of Data      Thank you.  Joe Kang D.O.   Vascular & General Neurology    01/29/25

## 2025-01-30 NOTE — PROGRESS NOTES
[facility]  part of [facility]    Progress Note    Chester Bautista Patient Status:  Inpatient    1940 MRN [id number]   Location [facility] 3W/SW Attending Richard Guerra MD   Hosp Day # 12 PCP Heike Sorto MD       Subjective:   Resting comfortably, NAD. In good spirits. Family present at the bedside. All questions and concerns addressed.   Currently receiving 1U pRBC.  S/p stent placement on 2025.  Venogram and port replacement later today.     Objective:   Blood pressure (!) 165/74, pulse 95, temperature 97 °F (36.1 °C), temperature source Temporal, resp. rate 20, height 5' 1\" (1.549 m), weight 135 lb 9.6 oz (61.5 kg), SpO2 92%.    General: AAF, NAD, ill appearing but improved  Respiratory: Clear to auscultation bilaterally. No wheezes. No rhonchi.  Cardiovascular: RRR  Abdomen: Soft, nontender, nondistended.  Neurologic: No focal neurological deficits.   Musculoskeletal: Moves all extremities.  Extremities: No edema or cyanosis.    Current Inpatient Medications:     Current Facility-Administered Medications:     magnesium hydroxide (Milk of Magnesia) 400 MG/5ML oral suspension 30 mL, 30 mL, Oral, Daily PRN    bisacodyl (Dulcolax) 10 MG rectal suppository 10 mg, 10 mg, Rectal, Daily PRN    fleet enema (Fleet) rectal enema 133 mL, 1 enema, Rectal, Once PRN    melatonin tab 3 mg, 3 mg, Oral, Nightly    heparin (Porcine) 96814 units/250mL infusion STROKE/NEURO CONTINUOUS, 200-3,000 Units/hr, Intravenous, Continuous    aspirin DR tab 81 mg, 81 mg, Oral, Daily    labetalol (Trandate) 5 mg/mL injection 10 mg, 10 mg, Intravenous, Q4H PRN    carvedilol (Coreg) tab 25 mg, 25 mg, Oral, BID with meals    glucose (Dex4) 15 GM/59ML oral liquid 15 g, 15 g, Oral, Q15 Min PRN **OR** glucose (Glutose) 40% oral gel 15 g, 15 g, Oral, Q15 Min PRN **OR** glucose-vitamin C (Dex-4) chewable tab 4 tablet, 4 tablet, Oral, Q15 Min PRN **OR** dextrose 50% injection 50 mL, 50 mL,  Intravenous, Q15 Min PRN **OR** glucose (Dex4) 15 GM/59ML oral liquid 30 g, 30 g, Oral, Q15 Min PRN **OR** glucose (Glutose) 40% oral gel 30 g, 30 g, Oral, Q15 Min PRN **OR** glucose-vitamin C (Dex-4) chewable tab 8 tablet, 8 tablet, Oral, Q15 Min PRN    lidocaine-menthol 4-1 % patch 1 patch, 1 patch, Transdermal, Daily    midazolam (Versed) 2 MG/2ML injection 2 mg, 2 mg, Intravenous, Once    octreoTIDe (SandoSTATIN) 50 mcg/mL injection 50 mcg, 50 mcg, Subcutaneous, Q8H    HYDROmorphone (Dilaudid) 1 MG/ML injection 0.2 mg, 0.2 mg, Intravenous, Q4H PRN    pantoprazole (Protonix) 40 mg in sodium chloride 0.9% PF 10 mL IV push, 40 mg, Intravenous, Q12H    sodium chloride 0.9% infusion, , Intravenous, Continuous    rosuvastatin (Crestor) tab 10 mg, 10 mg, Oral, Nightly    sennosides (Senokot) tab 8.6 mg, 8.6 mg, Oral, BID    ceFEPIme (Maxipime) 1 g in sodium chloride 0.9% 100 mL IVPB-MBP, 1 g, Intravenous, Q24H    cetirizine (ZyrTEC) tab 5 mg, 5 mg, Oral, Daily    acetaminophen (Tylenol Extra Strength) tab 500 mg, 500 mg, Oral, Q6H PRN    docusate sodium (Colace) cap 100 mg, 100 mg, Oral, BID    meclizine (Antivert) tab 12.5 mg, 12.5 mg, Oral, TID PRN    acetaminophen (Tylenol) tab 650 mg, 650 mg, Oral, Q4H PRN **OR** acetaminophen (Tylenol) rectal suppository 650 mg, 650 mg, Rectal, Q4H PRN    hydrALAzine (Apresoline) 20 mg/mL injection 10 mg, 10 mg, Intravenous, Q2H PRN    ondansetron (Zofran) 4 MG/2ML injection 4 mg, 4 mg, Intravenous, Q6H PRN    metoclopramide (Reglan) 5 mg/mL injection 5 mg, 5 mg, Intravenous, Q8H PRN    acetaminophen (Tylenol) tab 650 mg, 650 mg, Oral, Q6H PRN    ondansetron (Zofran) 4 MG/2ML injection 4 mg, 4 mg, Intravenous, Q6H PRN    albuterol (Ventolin) (2.5 MG/3ML) 0.083% nebulizer solution 2.5 mg, 2.5 mg, Nebulization, Q4H PRN    cyclobenzaprine (Flexeril) tab 10 mg, 10 mg, Oral, Nightly PRN    fluticasone-salmeterol (Advair Diskus) 250-50 MCG/ACT inhaler 1 puff, 1 puff, Inhalation, 2 times  per day    gabapentin (Neurontin) cap 100 mg, 100 mg, Oral, Nightly    montelukast (Singulair) tab 10 mg, 10 mg, Oral, Nightly    predniSONE (Deltasone) tab 5 mg, 5 mg, Oral, Daily    losartan (Cozaar) tab 50 mg, 50 mg, Oral, Daily    hydroxychloroquine (Plaquenil) tab 300 mg, 300 mg, Oral, Daily    oxyCODONE immediate release tab 5 mg, 5 mg, Oral, Q6H PRN    insulin aspart (NovoLOG) 100 Units/mL FlexPen 1-11 Units, 1-11 Units, Subcutaneous, TID CC    insulin degludec (Tresiba) 100 units/mL flextouch 10 Units, 10 Units, Subcutaneous, Nightly      Results:     Recent Labs   Lab 01/29/25  0501 01/29/25  2210 01/30/25  0348   RBC 2.90*  2.90* 2.84* 2.64*   HGB 7.2*  7.2* 7.3* 6.6*   HCT 23.7*  23.7* 22.8* 21.3*   MCV 81.7  81.7 80.3 80.7   MCH 24.8*  24.8* 25.7* 25.0*   MCHC 30.4*  30.4* 32.0 31.0   RDW 17.4*  17.4* 17.6* 17.7*   NEPRELIM 8.54* 12.34* 7.97*   WBC 12.5*  12.5* 15.1* 11.6*   .0  311.0 307.0 308.0         Recent Labs   Lab 01/28/25  0559 01/28/25  1808 01/29/25  0501 01/30/25  0348   *  --  155*  155* 159*  159*   BUN 8*  --  11  11 20  20   CREATSERUM 0.95  --  1.03*  1.03* 1.03*  1.03*   EGFRCR 59*  --  53*  53* 53*  53*   CA 9.5  --  9.5  9.5 9.3  9.3     --  142  142 139  139   K 3.6 4.9 4.0  4.0 3.7  3.7     --  110  110 107  107   CO2 23.0  --  23.0  23.0 24.0  24.0         Imaging:   XR CHEST AP PORTABLE  (CPT=71045)    Result Date: 1/29/2025  CONCLUSION:  1. Cardiomegaly.  Tortuous atherosclerotic aorta. 2. Extensive bilateral mixed alveolar and interstitial multifocal airspace opacification with interval progression in the upper lobes.    Dictated by (CST): Louis Moreno MD on 1/29/2025 at 9:03 AM     Finalized by (CST): Louis Moreno MD on 1/29/2025 at 9:06 AM          CT BRAIN OR HEAD (CPT=70450)    Result Date: 1/28/2025  CONCLUSION:  1.  Findings compatible with recent/subacute infarcts within the bilateral temporal lobes, left  parietal-occipital lobe junction, and scattered throughout the frontal lobe near the vertex.  These findings as well as other foci are better visualized on recent preceding MRI.  No hemorrhage. 2.  Post right frontal craniotomy.  Post vascular clip in the anterior cerebral artery distribution.    Dictated by (CST): Fermín Castro MD on 1/28/2025 at 5:23 PM     Finalized by (CST): Fermín Castro MD on 1/28/2025 at 5:26 PM                 Assessment and Plan:     #Anemia  -in the setting of PE and aortic arch thrombus, the decision was made to resume anticoagulation despite the patients history of bleeding AVMs  -monitor H&H, transfuse as indicated  -GI on consult   Start octreotide 50 mcg TID  Continue Brilinta as the benefit of AC are >> risks of bleeding  Possible endoscopic eval vs transfer to a tertiary care center for balloon enteroscopy and colonoscopy  Currently on a heparin drip  Monitor H&H  -transfused 1U pRBC (total 2U pRBC and 1U platelets since admission)    #Left Cerebellar Infarcts  #SAH  - pt with new sudden onset neuro changes  - 2 small cerebellar infarcts seen on admission MRI  -Patient already took 3 baby aspirin's prior to coming to ED   - CTA negative for LVO, but showing small subsegmental Pes  - Stroke alert called 1/19 -> stroke CT non-acute, MRI showing small CVA in L thalamus   - f/u lipid panel, A1c, TSH, ECHO   - Neuro on consult  -Continue statin  -Brillinta resumed 01/26/2025  -high risk morbidity/mortality given her history of GI bleed and now need for antiplatelet therapy and potential anticoagulation in the future. Family is aware  -gradually lower BP  -Vascular surgery on consult  -s/p stent placement 10/24/2025  -Brilinta x6 months  -CT head reviewed, repeat CT pending  -Appreciate further neuro and vascular surgery input  -venogram scheduled for later today  -continue present management    #Dizziness  Improved   -Likely in setting of above  -Meclizine prn   -Neurology  consulted   -continue brilinta and statin    #PE   -Patient is hemodynamically stable, EKG NSR and troponin not elevated.  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 Already took 3 baby aspirins prior to admission  -Previously had IVC filter placed in March 2024 for PE at that time (removed June 2024).   -Needs eval for hypercoagulable state. Risk factors of recent car ride, overall sedentary state, breast cancer. Sees Dr. Cade Chu in clinic for anemia, needs further follow up   -given current presentation the patient was restarted on AC, close Hb monitoring  -heparin drip at this time, change to PO at the time of discharge     #Descending aortic arch thrombus   -Admission CTA chest revealing mural thrombus throughout the thoracic and abdominal aorta, chronic per radiology  -Vascular surgery on consult  -planning for angiogram 01/24/2025  -cont brilinta and statin for now      #HTN  -Hold home antihypertensives to allow for permissive hypertension poststroke     #Klebsiella UTI  -nitrofurantoin initiated in ED, UCx reviewed - nitrofurantoin resistant  -started on IV Cefepime - complete course of treatment  -deescalate abx as indicated    #Constipation  -bowel regimen ordered     Chronic Medical Problems  HTN  HLD  DM  COPD/?sarcoidosis   Rheumatoid Arthritis  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

## 2025-01-30 NOTE — PLAN OF CARE
Problem: HEMATOLOGIC - ADULT  Goal: Free from bleeding injury  Description: (Example usage: patient with low platelets)  INTERVENTIONS:  - Avoid intramuscular injections, enemas and rectal medication administration  - Ensure safe mobilization of patient  - Hold pressure on venipuncture sites to achieve adequate hemostasis  - Assess for signs and symptoms of internal bleeding  - Monitor lab trends  - Patient is to report abnormal signs of bleeding to staff  - Avoid use of toothpicks and dental floss  - Use electric shaver for shaving  - Use soft bristle tooth brush  - Limit straining and forceful nose blowing  Outcome: Progressing     Problem: Diabetes/Glucose Control  Goal: Glucose maintained within prescribed range  Description: INTERVENTIONS:  - Monitor Blood Glucose as ordered  - Assess for signs and symptoms of hyperglycemia and hypoglycemia  - Administer ordered medications to maintain glucose within target range  - Assess barriers to adequate nutritional intake and initiate nutrition consult as needed  - Instruct patient on self management of diabetes  Outcome: Progressing     Problem: CARDIOVASCULAR - ADULT  Goal: Maintains optimal cardiac output and hemodynamic stability  Description: INTERVENTIONS:  - Monitor vital signs, rhythm, and trends  - Monitor for bleeding, hypotension and signs of decreased cardiac output  - Evaluate effectiveness of vasoactive medications to optimize hemodynamic stability  - Monitor arterial and/or venous puncture sites for bleeding and/or hematoma  - Assess quality of pulses, skin color and temperature  - Assess for signs of decreased coronary artery perfusion - ex. Angina  - Evaluate fluid balance, assess for edema, trend weights  Outcome: Progressing     Problem: NEUROLOGICAL - ADULT  Goal: Achieves stable or improved neurological status  Description: INTERVENTIONS  - Assess for and report changes in neurological status  - Initiate measures to prevent increased intracranial  pressure  - Maintain blood pressure and fluid volume within ordered parameters to optimize cerebral perfusion and minimize risk of hemorrhage  - Monitor temperature, glucose, and sodium. Initiate appropriate interventions as ordered  Outcome: Progressing     Problem: SAFETY ADULT - FALL  Goal: Free from fall injury  Description: INTERVENTIONS:  - Assess pt frequently for physical needs  - Identify cognitive and physical deficits and behaviors that affect risk of falls.  - Addison fall precautions as indicated by assessment.  - Educate pt/family on patient safety including physical limitations  - Instruct pt to call for assistance with activity based on assessment  - Modify environment to reduce risk of injury  - Provide assistive devices as appropriate  - Consider OT/PT consult to assist with strengthening/mobility  - Encourage toileting schedule  Outcome: Progressing     Problem: SKIN/TISSUE INTEGRITY - ADULT  Goal: Skin integrity remains intact  Description: INTERVENTIONS  - Assess and document risk factors for pressure ulcer development  - Assess and document skin integrity  - Monitor for areas of redness and/or skin breakdown  - Initiate interventions, skin care algorithm/standards of care as needed  Outcome: Progressing

## 2025-01-30 NOTE — PROGRESS NOTES
[facility]  part of [facility]    Progress Note      Assessment and Plan:   1.  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  As the risk of further thromboembolic event is high, low-dose heparin was initiated and there is no evidence of active bleed except at the recent port revision site.  Hemoglobin went from 7.4-6.6.      Recommendations:  1.  Hold low-dose heparin drip and monitor hemoglobin  2.  As per neurology.  3.  Permissive hypertension  4.  May require IVC filter.    2.  GI bleed-has had multiple AVMs identified previously.  Hemoglobin now 6.6.    Recommendations: As per GI.  PPI.  Serial hemoglobin.  If further bleeding, [facility] enteroscopy for ablation of AVMs.  Will give 1 unit of blood.  Holding the heparin at present.    3.  Bilateral PE-had IVC filter at 1 time now removed.  Lower extremity ultrasound now negative for clot.    Recommendations: Hold low-dose heparin for now.  May require IVC filter.    4.  Left groin hematoma-conservative management    5.  Chronic respiratory failure-patient has combined pulmonary fibrosis with emphysema.    Recommendations: Advair and conservative management at present    6.  Klebsiella UTI-on cefepime    7.  Port revision-IV heparin was held prior to port revision.  Ongoing mild oozing at site.    Subjective:   Chester Bautista is a(n) 84 year old female who is breathing comfortably and moving better.    Objective:   Blood pressure (!) 165/74, pulse 95, temperature 97 °F (36.1 °C), temperature source Temporal, resp. rate 20, height 5' 1\" (1.549 m), weight 135 lb 9.6 oz (61.5 kg), SpO2 92%.    Physical Exam alert white female  HEENT examination is unremarkable with pupils equal round and reactive to light and accommodation.   Neck without adenopathy, thyromegaly, JVD nor bruit.   Lungs clear to auscultation and percussion.  Cardiac regular  rate and rhythm no murmur.   Abdomen nontender, without hepatosplenomegaly and no mass appreciable.   Extremities without clubbing cyanosis nor edema.   Neurologic grossly intact with symmetric tone and strength and reflex except for lingering right homonymous hemianopsia partial.  Skin without gross abnormality     Results:     Lab Results   Component Value Date    WBC 11.6 01/30/2025    HGB 6.6 01/30/2025    HCT 21.3 01/30/2025    .0 01/30/2025    CREATSERUM 1.03 01/30/2025    CREATSERUM 1.03 01/30/2025    BUN 20 01/30/2025    BUN 20 01/30/2025     01/30/2025     01/30/2025    K 3.7 01/30/2025    K 3.7 01/30/2025     01/30/2025     01/30/2025    CO2 24.0 01/30/2025    CO2 24.0 01/30/2025     01/30/2025     01/30/2025    CA 9.3 01/30/2025    CA 9.3 01/30/2025    ALB 3.2 01/30/2025    ALKPHO 75 01/30/2025    BILT 0.3 01/30/2025    TP 6.2 01/30/2025    AST 21 01/30/2025    ALT <7 01/30/2025    PTT 51.3 01/30/2025    MG 1.8 01/30/2025       Chapin Parker MD  Medical Director, Critical Care, [facility]  Medical Director, [facility]  Pager: [phone]

## 2025-01-30 NOTE — PROGRESS NOTES
[facility]  [address], [address]  [facility] [address]  [phone]          INPATIENT STROKE NEUROLOGY   FOLLOW UP PROGRESS NOTE  [facility]  part of [facility]    Chester Bautista Patient Status:  Inpatient     1940 MRN [id number]    Location [facility] 2W/SW Attending Bonny Carranza MD    Hosp Day # 12 PCP Heike Sorto MD    Date of Admission:  2025  Date of Consult Follow Up: 25     Assessment and Plan:   Chester Bautista is a 84 year old woman w/ a pmhx of 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  Patient also found to have new subsegmental PEs on admission.    She has had increasing burden of stroke throughout her hospital stay.  Initially unable to anticoagulate because of her longstanding history of GI bleeding due to numerous AVMs.  Ultimately decided to start heparin drip on 2025 with PTT goal of 60-90 no boluses.  Also on antiplatelet therapy for left vertebral artery stent.    Initially she had left-sided posterior circulation strokes in her cerebellum on admission and a new left thalamic stroke on  now status post vertebral artery stenting on  C/B acute blood loss anemia requiring 1 unit of PRBCs.  Then had bihemispheric embolic strokes on 2025.    Per prior discussions with GI service patient is at high risk of recurrent bleeding due to her AVMs and the potential harms of anticoagulation would outweigh the potential benefits.   Therefore she was started on Plavix and then switched to Brilinta.    After her vertebral artery stenting the she complained of a headache with migrainous features.  She had a stat head CT which demonstrated contrast exacerbation versus new subarachnoid hemorrhage.  She had a repeat head CT 6 hours later which demonstrated continued improvement of her subarachnoid hemorrhage, and her repeat head CT at 6 AM on 1/25/2025 demonstrate near resolution of the contrast/subarachnoid blood.  Patient was maintained on her antiplatelets.  Repeat exam on the morning of 1/25/2025 demonstrated new global aphasia, and a questionable right visual field deficit.  She had a repeat stat head CT and CTA head and neck that did not demonstrate any interval any interval large vessel occlusion.  However, her repeat MRI of the brain demonstrates interval infarcts in her left parietal/occipital region, left posterior temporal lobe, small medial occipital lobe infarct,   L>R frontal lobe/motor cortex, and left frontal lobe. She has infarct in her anterior and posterior circulation.    Her exam   improved on 1/26/2025 however she still has persistent right hemianopsia and R LE > U UE weakness.    Restarted her Brilinta 90 mg twice daily on 1/26/2025.  Reassessment 1/27/2025; patient had a new oxygen requirement.  She was now on nasal cannula.  Discussed with critical care service who recommended stat lower extremity Dopplers and concurred that she would benefit from low intensity anticoagulation.    1/28/2025 her heparin drip with PTT goal of 60-90 was started.  There was no bolus.  Antiplatelets changed from Brilinta 90 mg twice daily to aspirin 81 mg daily.  Anticoagulation will tentatively be held on 1/29/2025 for GI procedure.  On the afternoon of the 28th patient complained of 5 out of 10 headache with dizziness and nausea.  Reported the dizziness was new.  Heparin drip was held.  She had a stat head CT that was negative for any acute  hemorrhage.  Her dizziness is improved and headache resolved spontaneously.    She was not a candidate for tPA because she is out of the time window and because of her history of recurrent GI bleeds and AVMs.  Not can for thrombectomy because no LVO.    ON  01/29/25 she is stable; endorses well formed visual hallucinations.  She  has insight into  her sx.    However, on 1/30/2025 she continues to have occult bleeding.  Her hemoglobin is dropped a unit .  Her heparin drip has been discontinued.  This does increase the risk of recurrent embolic event but she is still on antiplatelets.    Management of recurrent bleeding as per ICU and GI services.  Agree she may benefit from transfer to [facility].  Her neurological issues are otherwise stable.  Her exam is stable.  Because she is off anticoagulation recommend continuing every 2 hour neurochecks during the daytime and every 4 hours neurochecks during the evening.  The only solution may be that the patient should be on antiplatelets and not anticoagulation, however this is less likely to provide adequate risk reduction for stroke she would be an appropriate candidate for acute rehab.  Neurology will sign off.     Recurrent strokes due to chronic descending aorta mural thrombus in the setting of  chronic anemia due to numerous GI AVMs  Differential Diagnosis for stroke/TIA mechanism:  Embolic due to mural thrombus in her aorta; will need to determine why she has such extensive thrombosis of her aorta.  May be related to atherosclerotic disease.       Plan    Diagnostics:  Imaging: MRI brain WO, CT brain WO, and CTA head/neck  ; stat CTA head head and neck if there exam worsens again.  Repeat head CT on 1/28/2025 for new onset headache while on heparin drip was stable.  Cardiac imaging: Telemetry and TTE    Labs:  trend hemoglobin  and PTTs  Therapeutics:  Blood pressure goal: SBP Less  160  Please avoid blood pressure variability. wide fluctuations in BP can lead  to stroke expansion.    PRN hydralazine and labetalol for sustained pressures outside of goal  Neuro checks Q2 while awake, q4 at night.  Telemetry.NIH stroke scale per protocol.  Blood glucose goal: .  Accuchecks Q6 if patient has DM  closely monitor to prevent hypoglycemia in patients with AIS.  Antithrombotics: is for low intensity anticoagulation with a PTT goal of 60-90 and no boluses starting on 1/28/2025.  Lipid-lowering agents: Cholesterol Meds: rosuvastatin - 10 MG; rosuvastatin Tabs - 20 MG     Avoid hypotonic fluids as this can worsen cerebral edema.  Physical therapy, Occupational Therapy, speech therapy evaluations ordered.  maintain oxygen saturation >94%. No supplemental oxygen  in nonhypoxic patients with acute ischemic stroke (AIS).  Tx hyperthermia (temperature >38°C) and identify source  STAT head CT and page neurology if any change in neurological exam or focal deficits.    Long term secondary stroke prevention goals:  Home BP monitoring. Pt instructed to check BP at home in the AM and PM, and bring values to future clinic visits. BP goal is for normotension, < 130/80.  HbA1c goal <7.0  LDL-C goal  <70 mg/dL.  Target total cholesterol < 200 mg/dL Target HDL >45 mg/dL for men, >55 mg/dL for women, Target triglycerides <150 mg/dL  Physical inactivity - target exercise at least 3 times per week of moderate intensity activity for 20 minutes.  Obesity - target ideal body weight and girth <35\" for women, <40\" for men  Smoking - Target smoking cessation    2. Well formed visual hallucinations  Ddx:  Chester Bonnet syndrome  Neurodegenerative d/o ie posterior cortical atrophy/phelps's (doubt, she can read) or dementia w/ lewy bodies  Occipital lobe seizures; doubt         INTERVAL EVENTS  01/25/25: Neurological exam was worse this morning.  Patient was aphasic.  She could only say \"1, 2, and yes.\"  ?Left gaze preference but could look to the right when asked specifically.  Questionable incongruent  right hemianopsia..  Followed some axial and appendicular commands.  Repeat CTA for aphasia.  Repeat MRI demonstrated new infarct.  New motor deficits noted by RN at approximately 4:30 PM.  01/26/25  1:40 AM -acutely agitated.  Reported acute plan.  Complaint of significant visual field deficits.  Noted to have worsening drift in right upper extremity.  Neurological exam improved compared to 1/25/2025.  Restarted Brilinta 90 mg twice daily  01/27/25: New oxygen requirement.  Patient has nasal cannula in nares.  Reports of orthopnea which is new.  Repeat Dopplers ordered.  01/28/25:her heparin drip with PTT goal of 60-90 was started.  There was no bolus.  Antiplatelets changed from Brilinta 90 mg twice daily to aspirin 81 mg daily.    01/29/25: stable   1/30/2025: Recurrent GI bleeding/occult bleeding.       SUBJECTIVE:   Patient was seen laying in bed.  She is more bright and interactive.  She is more awake.  She is not in any pain.  Denies any headaches.  Denies any new neurological deficits.  Discussed her well formed visual hallucinations and recovery after stroke.      Pertinent positive and negatives per HPI.  All others were reviewed and negative.       Objective   OBJECTIVE:   Last vitals and weight :  Blood pressure (!) 165/74, pulse 95, temperature 97 °F (36.1 °C), temperature source Temporal, resp. rate 20, height 61\", weight 135 lb 9.6 oz (61.5 kg), SpO2 92%.   Vitals:    01/30/25 0800 01/30/25 0850 01/30/25 0905 01/30/25 1247   BP: 153/61 159/65 159/65 (!) 165/74   BP Location: Left arm Left arm     Pulse: 99 91 95    Resp: 15 18 20    Temp: 97.9 °F (36.6 °C) 96.8 °F (36 °C) 97 °F (36.1 °C) 97 °F (36.1 °C)   TempSrc: Temporal Temporal Temporal Temporal   SpO2: 92% 96% 95% 92%   Weight:       Height:          Exam:  - General: appears older than stated age and no distress  - CV: Symmetric pulses.   Carotids:   - Pulmonary: no overt sign of respiratory distress.    Neurologic Exam  - Mental Status: She is  awake and alert.  She is pleasant and cooperative.  She has insight into her deficits.  She is able to name.  She is able to repeat.  She is oriented to self.  She knew her location, the month and the year.  +expressive aphasia. Can't produce speech to tell me her age or , which bothers her. She can read.   - Cranial Nerves: No gaze preference.  Visual fields: Incongruent right hemianopsia.  Pupils are 4mm briskly constricting to 3mm and equally round and reactive to light  in a well lit room. EOMI. No nystagmus. No ptosis. V1-V3 intact B/L to light touch.No pathological facial asymmetry. No flattening of the nasolabial fold. .  Hearing grossly intact.  Tongue midline. No atrophy or fasiculations of the tongue noted. Palate and uvula elevate symmetrically.  Shoulder shrug symmetric.    - Motor:   diffusely Decreased bulk.    Pronator drift: No pronator drift .  Keep both arms elevated antigravity for 10 seconds.  She does have weakness in her right arm and has difficulty with finger-nose in her right arm because of her weakness.    Leg Drift: Able to keep left leg antigravity for 5 seconds.  Right leg drift.  - Sensory:   Light touch: normal  - Cerebellum: No truncal ataxia. No titubations. No dysmetria.         Medications:   melatonin  3 mg Oral Nightly    aspirin  81 mg Oral Daily    carvedilol  25 mg Oral BID with meals    lidocaine-menthol  1 patch Transdermal Daily    midazolam  2 mg Intravenous Once    octreoTIDe  50 mcg Subcutaneous Q8H    pantoprazole  40 mg Intravenous Q12H    rosuvastatin  10 mg Oral Nightly    sennosides  8.6 mg Oral BID    cefepime  1 g Intravenous Q24H    cetirizine  5 mg Oral Daily    docusate sodium  100 mg Oral BID    fluticasone-salmeterol  1 puff Inhalation 2 times per day    gabapentin  100 mg Oral Nightly    montelukast  10 mg Oral Nightly    predniSONE  5 mg Oral Daily    losartan  50 mg Oral Daily    hydroxychloroquine  300 mg Oral Daily    insulin aspart  1-11 Units  Subcutaneous TID CC    insulin degludec  10 Units Subcutaneous Nightly       PRNS:   magnesium hydroxide    bisacodyl    fleet enema    labetalol    glucose **OR** glucose **OR** glucose-vitamin C **OR** dextrose **OR** glucose **OR** glucose **OR** glucose-vitamin C    HYDROmorphone    acetaminophen    meclizine    acetaminophen **OR** acetaminophen    hydrALAzine    ondansetron    metoclopramide    acetaminophen    ondansetron    albuterol    cyclobenzaprine    oxyCODONE    Infusions:    continuous dose heparin Stopped (01/30/25 0440)    sodium chloride Stopped (01/23/25 1630)          Results:   Laboratory Data:  Lab Results   Component Value Date    WBC 11.6 (H) 01/30/2025    HGB 6.6 (LL) 01/30/2025    HCT 21.3 (L) 01/30/2025    .0 01/30/2025    CREATSERUM 1.03 (H) 01/30/2025    CREATSERUM 1.03 (H) 01/30/2025    BUN 20 01/30/2025    BUN 20 01/30/2025     01/30/2025     01/30/2025    K 3.7 01/30/2025    K 3.7 01/30/2025     01/30/2025     01/30/2025    CO2 24.0 01/30/2025    CO2 24.0 01/30/2025     (H) 01/30/2025     (H) 01/30/2025    CA 9.3 01/30/2025    CA 9.3 01/30/2025    ALB 3.2 01/30/2025    ALKPHO 75 01/30/2025    TP 6.2 01/30/2025    AST 21 01/30/2025    ALT <7 (L) 01/30/2025    PTT 51.3 (H) 01/30/2025    INR 1.04 01/25/2025    PTP 14.3 01/25/2025    T4F 1.2 01/17/2025    TSH 0.418 (L) 01/17/2025    DDIMER 2.77 (H) 01/25/2025    ESRML >130 (H) 11/29/2024    CRP 3.00 (H) 11/29/2024    MG 1.8 01/30/2025    PHOS 4.0 01/22/2025    B12 814 11/01/2024     Recent Results (from the past 72 hours)   POCT Glucose    Collection Time: 01/27/25  5:33 PM   Result Value Ref Range    POC Glucose  275 (H) 70 - 99 mg/dL   POCT Glucose    Collection Time: 01/27/25  8:40 PM   Result Value Ref Range    POC Glucose  207 (H) 70 - 99 mg/dL   Potassium    Collection Time: 01/27/25 10:54 PM   Result Value Ref Range    Potassium 4.0 3.5 - 5.1 mmol/L   CBC With Differential With Platelet     Collection Time: 01/28/25  5:36 AM   Result Value Ref Range    WBC 13.3 (H) 4.0 - 11.0 x10(3) uL    RBC 2.96 (L) 3.80 - 5.30 x10(6)uL    HGB 7.4 (L) 12.0 - 16.0 g/dL    HCT 23.4 (L) 35.0 - 48.0 %    MCV 79.1 (L) 80.0 - 100.0 fL    MCH 25.0 (L) 26.0 - 34.0 pg    MCHC 31.6 31.0 - 37.0 g/dL    RDW-SD 49.2 (H) 35.1 - 46.3 fL    RDW 17.2 (H) 11.0 - 15.0 %    .0 150.0 - 450.0 10(3)uL    Neutrophil Absolute Prelim 9.39 (H) 1.50 - 7.70 x10 (3) uL    Neutrophil Absolute 9.39 (H) 1.50 - 7.70 x10(3) uL    Lymphocyte Absolute 2.05 1.00 - 4.00 x10(3) uL    Monocyte Absolute 1.25 (H) 0.10 - 1.00 x10(3) uL    Eosinophil Absolute 0.45 0.00 - 0.70 x10(3) uL    Basophil Absolute 0.06 0.00 - 0.20 x10(3) uL    Immature Granulocyte Absolute 0.10 0.00 - 1.00 x10(3) uL    Neutrophil % 70.5 %    Lymphocyte % 15.4 %    Monocyte % 9.4 %    Eosinophil % 3.4 %    Basophil % 0.5 %    Immature Granulocyte % 0.8 %   Magnesium    Collection Time: 01/28/25  5:59 AM   Result Value Ref Range    Magnesium 1.6 1.6 - 2.6 mg/dL   Comp Metabolic Panel (14)    Collection Time: 01/28/25  5:59 AM   Result Value Ref Range    Glucose 151 (H) 70 - 99 mg/dL    Sodium 143 136 - 145 mmol/L    Potassium 3.6 3.5 - 5.1 mmol/L    Chloride 111 98 - 112 mmol/L    CO2 23.0 21.0 - 32.0 mmol/L    Anion Gap 9 0 - 18 mmol/L    BUN 8 (L) 9 - 23 mg/dL    Creatinine 0.95 0.55 - 1.02 mg/dL    BUN/CREA Ratio 8.4 (L) 10.0 - 20.0    Calcium, Total 9.5 8.7 - 10.4 mg/dL    Calculated Osmolality 297 (H) 275 - 295 mOsm/kg    eGFR-Cr 59 (L) >=60 mL/min/1.73m2    ALT <7 (L) 10 - 49 U/L    AST 22 <34 U/L    Alkaline Phosphatase 80 55 - 142 U/L    Bilirubin, Total 0.5 0.2 - 1.1 mg/dL    Total Protein 6.6 5.7 - 8.2 g/dL    Albumin 3.3 3.2 - 4.8 g/dL    Globulin  3.3 2.0 - 3.5 g/dL    A/G Ratio 1.0 1.0 - 2.0   Prepare fresh frozen plasma Once    Collection Time: 01/28/25  6:53 AM   Result Value Ref Range    Blood Product [id number]     Unit Number [id number]     UNIT ABO B      UNIT RH POS     Product Status Released from [employer]     Expiration Date [id number]     Blood Type Barcode 7300     Unit Volume 316 ml   PTT, Activated    Collection Time: 01/28/25  9:47 AM   Result Value Ref Range    PTT 32.1 23.0 - 36.0 seconds   POCT Glucose    Collection Time: 01/28/25 10:44 AM   Result Value Ref Range    POC Glucose  137 (H) 70 - 99 mg/dL   PTT, Activated    Collection Time: 01/28/25  1:59 PM   Result Value Ref Range    PTT 58.8 (H) 23.0 - 36.0 seconds   POCT Glucose    Collection Time: 01/28/25  5:53 PM   Result Value Ref Range    POC Glucose  239 (H) 70 - 99 mg/dL   Potassium    Collection Time: 01/28/25  6:08 PM   Result Value Ref Range    Potassium 4.9 3.5 - 5.1 mmol/L   POCT Glucose    Collection Time: 01/28/25  8:46 PM   Result Value Ref Range    POC Glucose  172 (H) 70 - 99 mg/dL   PTT, Activated    Collection Time: 01/28/25  9:26 PM   Result Value Ref Range    .7 (H) 23.0 - 36.0 seconds   Magnesium    Collection Time: 01/29/25  5:01 AM   Result Value Ref Range    Magnesium 1.6 1.6 - 2.6 mg/dL   Basic Metabolic Panel (8)    Collection Time: 01/29/25  5:01 AM   Result Value Ref Range    Glucose 155 (H) 70 - 99 mg/dL    Sodium 142 136 - 145 mmol/L    Potassium 4.0 3.5 - 5.1 mmol/L    Chloride 110 98 - 112 mmol/L    CO2 23.0 21.0 - 32.0 mmol/L    Anion Gap 9 0 - 18 mmol/L    BUN 11 9 - 23 mg/dL    Creatinine 1.03 (H) 0.55 - 1.02 mg/dL    BUN/CREA Ratio 10.7 10.0 - 20.0    Calcium, Total 9.5 8.7 - 10.4 mg/dL    Calculated Osmolality 297 (H) 275 - 295 mOsm/kg    eGFR-Cr 53 (L) >=60 mL/min/1.73m2   CBC, Platelet; No Differential    Collection Time: 01/29/25  5:01 AM   Result Value Ref Range    WBC 12.5 (H) 4.0 - 11.0 x10(3) uL    RBC 2.90 (L) 3.80 - 5.30 x10(6)uL    HGB 7.2 (L) 12.0 - 16.0 g/dL    HCT 23.7 (L) 35.0 - 48.0 %    MCV 81.7 80.0 - 100.0 fL    MCH 24.8 (L) 26.0 - 34.0 pg    MCHC 30.4 (L) 31.0 - 37.0 g/dL    RDW 17.4 (H) 11.0 - 15.0 %    RDW-SD 51.9 (H) 35.1 - 46.3 fL     .0 150.0 - 450.0 10(3)uL   Comp Metabolic Panel (14)    Collection Time: 01/29/25  5:01 AM   Result Value Ref Range    Glucose 155 (H) 70 - 99 mg/dL    Sodium 142 136 - 145 mmol/L    Potassium 4.0 3.5 - 5.1 mmol/L    Chloride 110 98 - 112 mmol/L    CO2 23.0 21.0 - 32.0 mmol/L    Anion Gap 9 0 - 18 mmol/L    BUN 11 9 - 23 mg/dL    Creatinine 1.03 (H) 0.55 - 1.02 mg/dL    BUN/CREA Ratio 10.7 10.0 - 20.0    Calcium, Total 9.5 8.7 - 10.4 mg/dL    Calculated Osmolality 297 (H) 275 - 295 mOsm/kg    eGFR-Cr 53 (L) >=60 mL/min/1.73m2    ALT <7 (L) 10 - 49 U/L    AST 21 <34 U/L    Alkaline Phosphatase 77 55 - 142 U/L    Bilirubin, Total 0.4 0.2 - 1.1 mg/dL    Total Protein 6.5 5.7 - 8.2 g/dL    Albumin 3.2 3.2 - 4.8 g/dL    Globulin  3.3 2.0 - 3.5 g/dL    A/G Ratio 1.0 1.0 - 2.0   CBC With Differential With Platelet    Collection Time: 01/29/25  5:01 AM   Result Value Ref Range    WBC 12.5 (H) 4.0 - 11.0 x10(3) uL    RBC 2.90 (L) 3.80 - 5.30 x10(6)uL    HGB 7.2 (L) 12.0 - 16.0 g/dL    HCT 23.7 (L) 35.0 - 48.0 %    MCV 81.7 80.0 - 100.0 fL    MCH 24.8 (L) 26.0 - 34.0 pg    MCHC 30.4 (L) 31.0 - 37.0 g/dL    RDW-SD 51.9 (H) 35.1 - 46.3 fL    RDW 17.4 (H) 11.0 - 15.0 %    .0 150.0 - 450.0 10(3)uL    Neutrophil Absolute Prelim 8.54 (H) 1.50 - 7.70 x10 (3) uL    Neutrophil Absolute 8.54 (H) 1.50 - 7.70 x10(3) uL    Lymphocyte Absolute 2.25 1.00 - 4.00 x10(3) uL    Monocyte Absolute 1.00 0.10 - 1.00 x10(3) uL    Eosinophil Absolute 0.53 0.00 - 0.70 x10(3) uL    Basophil Absolute 0.06 0.00 - 0.20 x10(3) uL    Immature Granulocyte Absolute 0.09 0.00 - 1.00 x10(3) uL    Neutrophil % 68.5 %    Lymphocyte % 18.0 %    Monocyte % 8.0 %    Eosinophil % 4.3 %    Basophil % 0.5 %    Immature Granulocyte % 0.7 %   PTT, Activated    Collection Time: 01/29/25  5:01 AM   Result Value Ref Range    .1 (H) 23.0 - 36.0 seconds   POCT Glucose    Collection Time: 01/29/25  6:26 AM   Result Value Ref Range    POC Glucose  141 (H)  70 - 99 mg/dL   POCT Glucose    Collection Time: 01/29/25  9:20 AM   Result Value Ref Range    POC Glucose  108 (H) 70 - 99 mg/dL   POCT Glucose    Collection Time: 01/29/25 11:50 AM   Result Value Ref Range    POC Glucose  123 (H) 70 - 99 mg/dL   PTT, Activated    Collection Time: 01/29/25  6:45 PM   Result Value Ref Range    PTT 33.1 23.0 - 36.0 seconds   POCT Glucose    Collection Time: 01/29/25  6:55 PM   Result Value Ref Range    POC Glucose  135 (H) 70 - 99 mg/dL   POCT Glucose    Collection Time: 01/29/25  9:47 PM   Result Value Ref Range    POC Glucose  293 (H) 70 - 99 mg/dL   CBC With Differential With Platelet    Collection Time: 01/29/25 10:10 PM   Result Value Ref Range    WBC 15.1 (H) 4.0 - 11.0 x10(3) uL    RBC 2.84 (L) 3.80 - 5.30 x10(6)uL    HGB 7.3 (L) 12.0 - 16.0 g/dL    HCT 22.8 (L) 35.0 - 48.0 %    MCV 80.3 80.0 - 100.0 fL    MCH 25.7 (L) 26.0 - 34.0 pg    MCHC 32.0 31.0 - 37.0 g/dL    RDW-SD 51.2 (H) 35.1 - 46.3 fL    RDW 17.6 (H) 11.0 - 15.0 %    .0 150.0 - 450.0 10(3)uL    Neutrophil Absolute Prelim 12.34 (H) 1.50 - 7.70 x10 (3) uL    Neutrophil Absolute 12.34 (H) 1.50 - 7.70 x10(3) uL    Lymphocyte Absolute 1.42 1.00 - 4.00 x10(3) uL    Monocyte Absolute 0.90 0.10 - 1.00 x10(3) uL    Eosinophil Absolute 0.23 0.00 - 0.70 x10(3) uL    Basophil Absolute 0.07 0.00 - 0.20 x10(3) uL    Immature Granulocyte Absolute 0.10 0.00 - 1.00 x10(3) uL    Neutrophil % 81.9 %    Lymphocyte % 9.4 %    Monocyte % 6.0 %    Eosinophil % 1.5 %    Basophil % 0.5 %    Immature Granulocyte % 0.7 %   PTT, Activated    Collection Time: 01/30/25  3:48 AM   Result Value Ref Range    PTT 51.3 (H) 23.0 - 36.0 seconds   Basic Metabolic Panel (8)    Collection Time: 01/30/25  3:48 AM   Result Value Ref Range    Glucose 159 (H) 70 - 99 mg/dL    Sodium 139 136 - 145 mmol/L    Potassium 3.7 3.5 - 5.1 mmol/L    Chloride 107 98 - 112 mmol/L    CO2 24.0 21.0 - 32.0 mmol/L    Anion Gap 8 0 - 18 mmol/L    BUN 20 9 - 23 mg/dL     Creatinine 1.03 (H) 0.55 - 1.02 mg/dL    BUN/CREA Ratio 19.4 10.0 - 20.0    Calcium, Total 9.3 8.7 - 10.4 mg/dL    Calculated Osmolality 294 275 - 295 mOsm/kg    eGFR-Cr 53 (L) >=60 mL/min/1.73m2   Magnesium    Collection Time: 01/30/25  3:48 AM   Result Value Ref Range    Magnesium 1.8 1.6 - 2.6 mg/dL   CBC With Differential With Platelet    Collection Time: 01/30/25  3:48 AM   Result Value Ref Range    WBC 11.6 (H) 4.0 - 11.0 x10(3) uL    RBC 2.64 (L) 3.80 - 5.30 x10(6)uL    HGB 6.6 (LL) 12.0 - 16.0 g/dL    HCT 21.3 (L) 35.0 - 48.0 %    MCV 80.7 80.0 - 100.0 fL    MCH 25.0 (L) 26.0 - 34.0 pg    MCHC 31.0 31.0 - 37.0 g/dL    RDW-SD 52.2 (H) 35.1 - 46.3 fL    RDW 17.7 (H) 11.0 - 15.0 %    .0 150.0 - 450.0 10(3)uL    Neutrophil Absolute Prelim 7.97 (H) 1.50 - 7.70 x10 (3) uL    Neutrophil Absolute 7.97 (H) 1.50 - 7.70 x10(3) uL    Lymphocyte Absolute 1.96 1.00 - 4.00 x10(3) uL    Monocyte Absolute 1.07 (H) 0.10 - 1.00 x10(3) uL    Eosinophil Absolute 0.45 0.00 - 0.70 x10(3) uL    Basophil Absolute 0.06 0.00 - 0.20 x10(3) uL    Immature Granulocyte Absolute 0.07 0.00 - 1.00 x10(3) uL    Neutrophil % 68.9 %    Lymphocyte % 16.9 %    Monocyte % 9.2 %    Eosinophil % 3.9 %    Basophil % 0.5 %    Immature Granulocyte % 0.6 %   Comp Metabolic Panel (14)    Collection Time: 01/30/25  3:48 AM   Result Value Ref Range    Glucose 159 (H) 70 - 99 mg/dL    Sodium 139 136 - 145 mmol/L    Potassium 3.7 3.5 - 5.1 mmol/L    Chloride 107 98 - 112 mmol/L    CO2 24.0 21.0 - 32.0 mmol/L    Anion Gap 8 0 - 18 mmol/L    BUN 20 9 - 23 mg/dL    Creatinine 1.03 (H) 0.55 - 1.02 mg/dL    BUN/CREA Ratio 19.4 10.0 - 20.0    Calcium, Total 9.3 8.7 - 10.4 mg/dL    Calculated Osmolality 294 275 - 295 mOsm/kg    eGFR-Cr 53 (L) >=60 mL/min/1.73m2    ALT <7 (L) 10 - 49 U/L    AST 21 <34 U/L    Alkaline Phosphatase 75 55 - 142 U/L    Bilirubin, Total 0.3 0.2 - 1.1 mg/dL    Total Protein 6.2 5.7 - 8.2 g/dL    Albumin 3.2 3.2 - 4.8 g/dL     Globulin  3.0 2.0 - 3.5 g/dL    A/G Ratio 1.1 1.0 - 2.0   ABORH (Blood Type)    Collection Time: 01/30/25  5:56 AM   Result Value Ref Range    ABO BLOOD TYPE B     RH BLOOD TYPE Positive    Antibody Screen    Collection Time: 01/30/25  5:56 AM   Result Value Ref Range    Antibody Screen Negative    Prepare RBC Once    Collection Time: 01/30/25  8:33 AM   Result Value Ref Range    Blood Product [id number]     Unit Number [id number]     UNIT ABO B     UNIT RH POS     Product Status Issued     Expiration Date [id number]     Blood Type Barcode 7300     Unit Volume 350 ml   POCT Glucose    Collection Time: 01/30/25  9:02 AM   Result Value Ref Range    POC Glucose  189 (H) 70 - 99 mg/dL   POCT Glucose    Collection Time: 01/30/25  1:58 PM   Result Value Ref Range    POC Glucose  271 (H) 70 - 99 mg/dL            Last A1c was done on 1/18/2025.       Test results/Imaging:   XR CHEST AP PORTABLE  (CPT=71045)    Result Date: 1/29/2025  CONCLUSION:  1. Cardiomegaly.  Tortuous atherosclerotic aorta. 2. Extensive bilateral mixed alveolar and interstitial multifocal airspace opacification with interval progression in the upper lobes.    Dictated by (CST): Louis Moreno MD on 1/29/2025 at 9:03 AM     Finalized by (CST): Louis Moreno MD on 1/29/2025 at 9:06 AM          CT BRAIN OR HEAD (CPT=70450)    Result Date: 1/28/2025  CONCLUSION:  1.  Findings compatible with recent/subacute infarcts within the bilateral temporal lobes, left parietal-occipital lobe junction, and scattered throughout the frontal lobe near the vertex.  These findings as well as other foci are better visualized on recent preceding MRI.  No hemorrhage. 2.  Post right frontal craniotomy.  Post vascular clip in the anterior cerebral artery distribution.    Dictated by (CST): Fermín Castro MD on 1/28/2025 at 5:23 PM     Finalized by (CST): Fermín Castro MD on 1/28/2025 at 5:26 PM               CT BRAIN OR HEAD (CPT=70450)    Result Date:  1/28/2025  CONCLUSION:  1.  Findings compatible with recent/subacute infarcts within the bilateral temporal lobes, left parietal-occipital lobe junction, and scattered throughout the frontal lobe near the vertex.  These findings as well as other foci are better visualized on recent preceding MRI.  No hemorrhage. 2.  Post right frontal craniotomy.  Post vascular clip in the anterior cerebral artery distribution.    Dictated by (CST): Fermín Castro MD on 1/28/2025 at 5:23 PM     Finalized by (CST): Fermín Castro MD on 1/28/2025 at 5:26 PM          CT BRAIN OR HEAD (CPT=70450)    Result Date: 1/25/2025  CONCLUSION:  1. No intracranial hemorrhage. 2. Acute left parietal occipital cortical infarct and posterior temporal cortical infarct.  Other acute infarcts seen on the recent MRI are not detectable with certainty. 3. Right frontal craniotomy and para falcine aneurysm clipping.  New line large vessel intracranial atherosclerosis.    Dictated by (CST): Michael Gonzalez MD on 1/25/2025 at 7:43 PM     Finalized by (CST): Michael Gonzalez MD on 1/25/2025 at 7:48 PM          MRI BRAIN (CPT=70551)    Result Date: 1/25/2025  CONCLUSION:  1. Interval development of multiple acute infarcts with largest in the left parietal occipital region and left posterior temporal lobe..  Other smaller infarcts in the bilateral posterior frontal lobes, right paramedian posterior parietal lobe, right occipital lobe, left anterior frontal lobe. 2. Recent left thalamic lacunar infarct is unchanged.  3. One of recent cerebellar lacunar infarcts is much less conspicuous and others no longer restrict diffusion.  Other chronic cerebellar lacunar infarcts. 4. Post right frontal craniotomy for aneurysm clipping.    Dictated by (CST): Michael Gonzalez MD on 1/25/2025 at 6:22 PM     Finalized by (CST): Michael Gonzalez MD on 1/25/2025 at 6:31 PM          CTA BRAIN + CTA CAROTIDS (CPT=70496/61831)    Result Date: 1/25/2025  CONCLUSION:  1. No large vessel  occlusion, hemodynamically significant stenosis, dissection, AVM or aneurysm. 2. Wall 3. Right frontal craniotomy for aneurysm clipping. 4. Patent proximal left subclavian artery stent. 5. Changes of chronic small vessel disease in both cerebral hemispheres. 6. Irregular atherosclerotic plaque in the thoracic aorta. 7. Advanced emphysema with pleural parenchymal scarring.     Dictated by (CST): Michael Gonzalez MD on 1/25/2025 at 1:31 PM     Finalized by (CST): Michael Gonzalez MD on 1/25/2025 at 1:49 PM          CT BRAIN OR HEAD (CPT=70450)    Result Date: 1/24/2025  CONCLUSION:  1. Subarachnoid hemorrhage in the left frontal high convexity in bilateral occipital lobes with mild cerebral edema. Continued surveillance is recommended.  2. Postoperative changes of frontal craniotomy and anterior parafalcine aneurysm clipping.  3. Senescent changes of parenchymal volume loss with sequela of chronic microvascular ischemic disease.  4. There is large vessel atherosclerosis involving the anterior and posterior intracranial circulations.  5. Lesser incidental findings as above.      Results of this examination were conveyed to the patient's physician, Dr. Ronald March, by Dr. Gruber at 1838 on 01/24/2025.   Dictated by (CST): Sharath Gruber MD on 1/24/2025 at 6:35 PM     Finalized by (CST): Sharath Gruber MD on 1/24/2025 at 6:41 PM          CT BRAIN OR HEAD (CPT=70450)    Result Date: 1/24/2025  CONCLUSION:  1. Retained contrast material throughout the large intracranial vessels and cerebral sulci from same date cardiac angiogram.  Please note that this finding limits evaluation for detection of potential acute intracranial hemorrhage.  Aforementioned sulcal  enhancement most likely relates to a contrast staining.  However, if symptoms or strong clinical suspicion for acute intracranial hemorrhage persist, a short interval 6-12 hour follow-up head CT is recommended. 2. Subcentimeter enhancing foci in the left cerebellum (x2)  likely relate to known enhancing subacute lacunar type infarcts.  Continued follow-up to ensure resolution of enhancement is advised. 3. Midline frontal craniotomy and anterior parafalcine aneurysm clipping. 4. Intracranial atherosclerosis. 5. Stable chronic moderate adenoid enlargement.  elm-remote  Dictated by (CST): Jose Enrique Valente MD on 1/24/2025 at 11:33 AM     Finalized by (CST): Jose Enrique Valente MD on 1/24/2025 at 11:40 AM          MRI BRAIN (CPT=70551)    Result Date: 1/20/2025  CONCLUSION:  1. Interval development of a punctate lacunar infarct of the left thalamus, concerning for acute ischemia.  2. Subacute lacunar infarcts of the left cerebellar hemisphere.   3. Postoperative changes of frontal craniotomy and parafalcine aneurysm clipping.  4. Senescent changes of parenchymal volume loss with mild sequela of chronic microvascular ischemic disease.  5. Lesser incidental findings as above.      A preliminary report was issued by the [facility] teleradiology service. There are no major discrepancies.  elm-remote.   Dictated by (CST): Sharath Gruber MD on 1/20/2025 at 9:24 AM     Finalized by (CST): Sharath Gruber MD on 1/20/2025 at 9:31 AM          CT STROKE BRAIN (NO IV)(CPT=70450)    Result Date: 1/19/2025  CONCLUSION:  1. Small focus of hypoattenuation involving the left cerebellar hemisphere, which corresponds to the acute infarct demonstrated on the prior MRI brain dated 01/17/2025. 2. Otherwise, no transcortical area of hypoattenuation to suggest an acute infarct.  If there is clinical concern for an acute infarct, consider further evaluation with an MRI of the brain. 3. No acute intracranial hemorrhage, midline shift, mass effect, or hydrocephalus. 4. Redemonstrated postoperative changes from prior frontal craniotomy for aneurysm clipping along the course of the anterior cerebral arteries.  The associated metallic artifact from the aneurysm clips limits evaluation of the adjacent structures. 5.  Lesser incidental findings described above.    Impression points 1-3 were communicated via telephone to Leonidas SOW at 9:39 a.m. on 01/19/2025 by Joe Deleon MD  Dictated by (CST): Joe Deleon MD on 1/19/2025 at 9:34 AM     Finalized by (CST): Joe Deleon MD on 1/19/2025 at 9:41 AM          CT STROKE CTA BRAIN/CTA NECK (W IV)(CPT=70496/20005)    Result Date: 1/17/2025  CONCLUSION:  1. Negative for hemodynamically significant stenosis or occlusion of the anterior or posterior circulations.   2. The internal carotid arteries demonstrate tortuosity proximally without evidence of hemodynamically stenosis or occlusion.  3. The vertebral arteries demonstrate contiguous patency bilaterally without evidence of flow-limiting stenosis.  4. 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 Thrombus extends into the proximal left subclavian artery origin, contributing to luminal narrowing.  5. Dilatation of the main pulmonary artery trunk may relate to underlying pulmonary hypertension.  6. 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   7. Lesser incidental findings as above.   Dictated by (CST): Sharath Gruber MD on 1/17/2025 at 7:27 PM     Finalized by (CST): Sharath Gruber MD on 1/17/2025 at 7:36 PM          CT STROKE BRAIN (NO IV)(CPT=70450)    Result Date: 1/17/2025  CONCLUSION:  1. No acute intracranial hemorrhage or evidence of extended large vessel infarction. If there is clinical concern for acute ischemia, follow-up MRI suggested.   2. Postoperative changes of frontal craniotomy with anterior parafalcine aneurysm clipping.  3. Senescent changes of parenchymal volume loss with sequela of chronic microvascular ischemic disease.  4. There is large vessel atherosclerosis of the anterior and posterior intracranial circulations.  5. Lesser incidental findings as above.    This report was called immediately at 1915 hours to Emergency Department Pod 3 and discussed with the patient's  ER physician, Dr. Cottrell.    Dictated by (CST): Sharath Gruber MD on 1/17/2025 at 7:11 PM     Finalized by (CST): Sharath Gruber MD on 1/17/2025 at 7:15 PM           Performed an independent visualization of head CT, CTA head and neck, MRI brain from 1/25/2025.  Also reviewed recent imaging throughout the week.  Reviewed multiple head CTs and CTAs as well as prior MRIs. Head ct from 1/28/25  Imaging revealed: Agree with radiology read.    Education/Instructions given to: patient, daughter, granddaughter, daughter's   Barriers to Learning:None  Content: Refer to note above. Evaluation/Outcome: Verbalized understanding    Disclaimer:   This record was dictated using Dragon software. There may be errors due to voice recognition problems that were not realized and corrected during the completion of the note.      This document is not intended to support charting by exception.  Sections left blank in a completed note should be presumed not to have been done.          01/31/25    Upon my evaluation, this patient had a high probability of imminent or life-threatening deterioration due to critical findings of laboratory tests, critical findings of imaging, central nervous system failure, acute CVA/stroke , and bleeding diatheses, which required my direct attention, intervention, and personal management.     I have personally provided 35 minutes of critical care time, exclusive of time spent on separately billable procedures.  Time includes review of all pertinent laboratory/radiology results, discussion with consultants, and monitoring for potential decompensation.  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

## 2025-01-30 NOTE — TELEPHONE ENCOUNTER
Pt currently in-patient, came to ER on 1/17/25. Admitted for acute infarcts. Pt will get octreotide TID while in the hospital. May require possible transfer to tertiary care facility for balloon enteroscopy to ablate dominate AVM lesions.     Will re-assess possible home injections after acute hospitalization.

## 2025-01-30 NOTE — PROGRESS NOTES
[facility]     Gastroenterology Progress Note    Chester Bautista Patient Status:  Inpatient    1940 MRN [id number]   Location [facility] 2W/SW Attending Bonny Carranza MD   Hosp Day # 12 PCP Heike Sorto MD       Subjective:   Patient feels well today     No BM this am, small brown BM last night  Denies nausea and vomiting  About to eat breakfast    Objective:   Blood pressure 159/65, pulse 91, temperature 96.8 °F (36 °C), temperature source Temporal, resp. rate 18, height 5' 1\" (1.549 m), weight 135 lb 9.6 oz (61.5 kg), SpO2 96%. Body mass index is 25.62 kg/m².    Gen: awake, alert patient, NAD  HEENT: EOMI, the sclera appears anicteric, oropharynx clear, mucus membranes appear moist  CV: RRR  Lung: no conversational dyspnea   Abdomen: soft NTND abdomen with NABS appreciated   Skin: dry, warm, no jaundice  Ext: no LE edema is evident  Neuro: Alert and interactive  Psych: calm, cooperative    Assessment and Plan:   84 year old woman with h/o DM-II, hypertension, ?pulm HTN, rheumatoid arthritis, COPD; previous history frontal craniotomy with anterior parafalcine aneurysm clipping; multiple pulmonary emboli on previous CT scan 2024; well known to our service due to previous concern for occult GI bleeding and chronic blood loss anemia, 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     Admitted for acute infarcts, she is now on BRILINTA. Developed dark stools on  with drop in hgb after angiogram, stenting, procedural heparin. Received 1 unit prbc and hgb response sustained since then.      She is high risk for thrombotic events especially in light of recurrent strokes. At this point the benefits of anticoagulation >> risks of bleeding.       She was started on low-dose heparin drip yesterday.  Had drop in hemoglobin overnight, however likely due to bleeding after oozing from new port site.     Plan to continue to monitor hemoglobin. If she incurs  worsening anemia and more frequent transfusion requirements, would recommend tertiary care evaluation for balloon enteroscopy in hopes of ablating dominant AVM lesions.      Recommendations:  Continue octreotide 50 mcg sub-q TID while admitted  Brilinta to continue.  Started on heparin drip  monitor hgb  GI will sign off, please contact if return of overt bleeding     Case discussed with Tere Miller MD and Sandra SOW.    Rahel Adam PA-C  [facility]astroenter[facility]  1/30/2025      Results:     Lab Results   Component Value Date    WBC 11.6 (H) 01/30/2025    HGB 6.6 (LL) 01/30/2025    HCT 21.3 (L) 01/30/2025    .0 01/30/2025    CREATSERUM 1.03 (H) 01/30/2025    CREATSERUM 1.03 (H) 01/30/2025    BUN 20 01/30/2025    BUN 20 01/30/2025     01/30/2025     01/30/2025    K 3.7 01/30/2025    K 3.7 01/30/2025     01/30/2025     01/30/2025    CO2 24.0 01/30/2025    CO2 24.0 01/30/2025     (H) 01/30/2025     (H) 01/30/2025    CA 9.3 01/30/2025    CA 9.3 01/30/2025    ALB 3.2 01/30/2025    ALKPHO 75 01/30/2025    BILT 0.3 01/30/2025    TP 6.2 01/30/2025    AST 21 01/30/2025    ALT <7 (L) 01/30/2025    PTT 51.3 (H) 01/30/2025    INR 1.04 01/25/2025    T4F 1.2 01/17/2025    TSH 0.418 (L) 01/17/2025    DDIMER 2.77 (H) 01/25/2025    ESRML >130 (H) 11/29/2024    CRP 3.00 (H) 11/29/2024    MG 1.8 01/30/2025    PHOS 4.0 01/22/2025    B12 814 11/01/2024       XR CHEST AP PORTABLE  (CPT=71045)    Result Date: 1/29/2025  CONCLUSION:  1. Cardiomegaly.  Tortuous atherosclerotic aorta. 2. Extensive bilateral mixed alveolar and interstitial multifocal airspace opacification with interval progression in the upper lobes.    Dictated by (CST): Louis Moreno MD on 1/29/2025 at 9:03 AM     Finalized by (CST): Louis Moreno MD on 1/29/2025 at 9:06 AM          CT BRAIN OR HEAD (CPT=70450)    Result Date: 1/28/2025  CONCLUSION:  1.  Findings compatible with recent/subacute  infarcts within the bilateral temporal lobes, left parietal-occipital lobe junction, and scattered throughout the frontal lobe near the vertex.  These findings as well as other foci are better visualized on recent preceding MRI.  No hemorrhage. 2.  Post right frontal craniotomy.  Post vascular clip in the anterior cerebral artery distribution.    Dictated by (CST): Fermín Castro MD on 1/28/2025 at 5:23 PM     Finalized by (CST): Fermín Castro MD on 1/28/2025 at 5:26 PM

## 2025-01-31 ENCOUNTER — APPOINTMENT (OUTPATIENT)
Dept: INTERVENTIONAL RADIOLOGY/VASCULAR | Facility: HOSPITAL | Age: 85
End: 2025-01-31
Attending: CLINICAL NURSE SPECIALIST
Payer: MEDICARE

## 2025-01-31 PROBLEM — R44.1 VISUAL HALLUCINATIONS: Status: ACTIVE | Noted: 2025-01-29

## 2025-01-31 LAB
ALBUMIN SERPL-MCNC: 3.2 G/DL (ref 3.2–4.8)
ALBUMIN/GLOB SERPL: 1.1 {RATIO} (ref 1–2)
ALP LIVER SERPL-CCNC: 71 U/L
ALT SERPL-CCNC: 7 U/L
ANION GAP SERPL CALC-SCNC: 7 MMOL/L (ref 0–18)
ANION GAP SERPL CALC-SCNC: 7 MMOL/L (ref 0–18)
AST SERPL-CCNC: 19 U/L (ref ?–34)
BASOPHILS # BLD AUTO: 0.08 X10(3) UL (ref 0–0.2)
BASOPHILS NFR BLD AUTO: 0.6 %
BILIRUB SERPL-MCNC: 0.5 MG/DL (ref 0.2–1.1)
BLOOD TYPE BARCODE: 7300
BUN BLD-MCNC: 21 MG/DL (ref 9–23)
BUN BLD-MCNC: 21 MG/DL (ref 9–23)
BUN/CREAT SERPL: 21.2 (ref 10–20)
BUN/CREAT SERPL: 21.2 (ref 10–20)
CALCIUM BLD-MCNC: 9.6 MG/DL (ref 8.7–10.4)
CALCIUM BLD-MCNC: 9.6 MG/DL (ref 8.7–10.4)
CHLORIDE SERPL-SCNC: 106 MMOL/L (ref 98–112)
CHLORIDE SERPL-SCNC: 106 MMOL/L (ref 98–112)
CO2 SERPL-SCNC: 25 MMOL/L (ref 21–32)
CO2 SERPL-SCNC: 25 MMOL/L (ref 21–32)
CREAT BLD-MCNC: 0.99 MG/DL
CREAT BLD-MCNC: 0.99 MG/DL
DEPRECATED RDW RBC AUTO: 51.5 FL (ref 35.1–46.3)
DEPRECATED RDW RBC AUTO: 51.5 FL (ref 35.1–46.3)
EGFRCR SERPLBLD CKD-EPI 2021: 56 ML/MIN/1.73M2 (ref 60–?)
EGFRCR SERPLBLD CKD-EPI 2021: 56 ML/MIN/1.73M2 (ref 60–?)
EOSINOPHIL # BLD AUTO: 0.51 X10(3) UL (ref 0–0.7)
EOSINOPHIL NFR BLD AUTO: 3.7 %
ERYTHROCYTE [DISTWIDTH] IN BLOOD BY AUTOMATED COUNT: 18.3 % (ref 11–15)
ERYTHROCYTE [DISTWIDTH] IN BLOOD BY AUTOMATED COUNT: 18.3 % (ref 11–15)
GLOBULIN PLAS-MCNC: 3 G/DL (ref 2–3.5)
GLUCOSE BLD-MCNC: 192 MG/DL (ref 70–99)
GLUCOSE BLD-MCNC: 192 MG/DL (ref 70–99)
GLUCOSE BLDC GLUCOMTR-MCNC: 191 MG/DL (ref 70–99)
GLUCOSE BLDC GLUCOMTR-MCNC: 204 MG/DL (ref 70–99)
GLUCOSE BLDC GLUCOMTR-MCNC: 236 MG/DL (ref 70–99)
GLUCOSE BLDC GLUCOMTR-MCNC: 243 MG/DL (ref 70–99)
GLUCOSE BLDC GLUCOMTR-MCNC: 93 MG/DL (ref 70–99)
HCT VFR BLD AUTO: 24.9 %
HCT VFR BLD AUTO: 24.9 %
HGB BLD-MCNC: 7.8 G/DL
HGB BLD-MCNC: 7.8 G/DL
IMM GRANULOCYTES # BLD AUTO: 0.09 X10(3) UL (ref 0–1)
IMM GRANULOCYTES NFR BLD: 0.7 %
LYMPHOCYTES # BLD AUTO: 2.39 X10(3) UL (ref 1–4)
LYMPHOCYTES NFR BLD AUTO: 17.5 %
MAGNESIUM SERPL-MCNC: 1.5 MG/DL (ref 1.6–2.6)
MCH RBC QN AUTO: 24.5 PG (ref 26–34)
MCH RBC QN AUTO: 24.5 PG (ref 26–34)
MCHC RBC AUTO-ENTMCNC: 31.3 G/DL (ref 31–37)
MCHC RBC AUTO-ENTMCNC: 31.3 G/DL (ref 31–37)
MCV RBC AUTO: 78.3 FL
MCV RBC AUTO: 78.3 FL
MONOCYTES # BLD AUTO: 1.38 X10(3) UL (ref 0.1–1)
MONOCYTES NFR BLD AUTO: 10.1 %
NEUTROPHILS # BLD AUTO: 9.18 X10 (3) UL (ref 1.5–7.7)
NEUTROPHILS # BLD AUTO: 9.18 X10(3) UL (ref 1.5–7.7)
NEUTROPHILS NFR BLD AUTO: 67.4 %
OSMOLALITY SERPL CALC.SUM OF ELEC: 294 MOSM/KG (ref 275–295)
OSMOLALITY SERPL CALC.SUM OF ELEC: 294 MOSM/KG (ref 275–295)
PLATELET # BLD AUTO: 264 10(3)UL (ref 150–450)
PLATELET # BLD AUTO: 264 10(3)UL (ref 150–450)
POTASSIUM SERPL-SCNC: 3.7 MMOL/L (ref 3.5–5.1)
PROT SERPL-MCNC: 6.2 G/DL (ref 5.7–8.2)
RBC # BLD AUTO: 3.18 X10(6)UL
RBC # BLD AUTO: 3.18 X10(6)UL
SODIUM SERPL-SCNC: 138 MMOL/L (ref 136–145)
SODIUM SERPL-SCNC: 138 MMOL/L (ref 136–145)
UNIT VOLUME: 350 ML
WBC # BLD AUTO: 13.6 X10(3) UL (ref 4–11)
WBC # BLD AUTO: 13.6 X10(3) UL (ref 4–11)

## 2025-01-31 PROCEDURE — 99233 SBSQ HOSP IP/OBS HIGH 50: CPT | Performed by: INTERNAL MEDICINE

## 2025-01-31 PROCEDURE — B5191ZA FLUOROSCOPY OF INFERIOR VENA CAVA USING LOW OSMOLAR CONTRAST, GUIDANCE: ICD-10-PCS | Performed by: RADIOLOGY

## 2025-01-31 PROCEDURE — 06H03DZ INSERTION OF INTRALUMINAL DEVICE INTO INFERIOR VENA CAVA, PERCUTANEOUS APPROACH: ICD-10-PCS | Performed by: RADIOLOGY

## 2025-01-31 PROCEDURE — 99232 SBSQ HOSP IP/OBS MODERATE 35: CPT | Performed by: REGISTERED NURSE

## 2025-01-31 PROCEDURE — 99233 SBSQ HOSP IP/OBS HIGH 50: CPT | Performed by: FAMILY MEDICINE

## 2025-01-31 RX ORDER — POTASSIUM CHLORIDE 1500 MG/1
40 TABLET, EXTENDED RELEASE ORAL ONCE
Status: COMPLETED | OUTPATIENT
Start: 2025-01-31 | End: 2025-01-31

## 2025-01-31 RX ORDER — IOPAMIDOL 612 MG/ML
100 INJECTION, SOLUTION INTRAVASCULAR
Status: COMPLETED | OUTPATIENT
Start: 2025-01-31 | End: 2025-01-31

## 2025-01-31 RX ORDER — MIDAZOLAM HYDROCHLORIDE 1 MG/ML
INJECTION INTRAMUSCULAR; INTRAVENOUS
Status: DISCONTINUED
Start: 2025-01-31 | End: 2025-01-31 | Stop reason: WASHOUT

## 2025-01-31 RX ORDER — LIDOCAINE HYDROCHLORIDE 20 MG/ML
INJECTION, SOLUTION EPIDURAL; INFILTRATION; INTRACAUDAL; PERINEURAL
Status: COMPLETED
Start: 2025-01-31 | End: 2025-01-31

## 2025-01-31 RX ORDER — PANTOPRAZOLE SODIUM 40 MG/1
40 TABLET, DELAYED RELEASE ORAL
Status: DISCONTINUED | OUTPATIENT
Start: 2025-01-31 | End: 2025-02-05

## 2025-01-31 NOTE — PROGRESS NOTES
Pulmonary/ICU/Critical Care Progress Note        Reason for Consultation: GIB, SAH  Referring Physician: Dr. Carranza      Subjective:  On RA afebrile  S/p pRBC transfusion on 1/30/25.  Feels ok otherwise      From the initial consultation  Pt unable to provide much info this afternoon so some info obtained from her dtr at bedside. 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 Parker), breast CA, DM, HTN, PONV, PE s/p IVC filter that has been removed since, RA admitted with severe vertigo type symptoms on 1/17/25.   On admission,seen by neurology. MRI brain showed acute CVA of left cerebellum and recommended starting plavix. GI was consulted to approve initiation of plavix given hx of AVMs. Further workup with CT angio of the head and neck was notable for bilateral antegrade vertebral artery flow, however there was mild to moderate stenosis at the origin of the left vertebral artery and there was a thrombus in the aortic arch encroaching to the origin of the left subclavian artery. Vascular surgery was consulted. Her hospital course has been complicated with another stroke identified in the left thalamus on MRI on 1/19/2025.   Pt was subsequently started on brilinta and vascular surgery took the pt for a left upper extremity, aortic arch and cerebral angiogram. Pt underwent stent placement in left subclavian artery. However, the pt complained of headaches post intervention, a subsequent CT head revealed contrast extravasation versus SAH.   Pt is now transferred to ICU for q1 neurochecks  Of note, pt is also more anemic today with hg drop to 6.4. she reports her abdomen hurts since this am. She just had a BM that was very dark concerning for GIB.      REVIEW OF SYSTEMS:  Positives and negatives as noted in HPI. All other review of systems otherwise are either limited (due to  pt/family inability to provide) or negative.      PAST MEDICAL HISTORY:  Past Medical History:   Diagnosis Date    Anxiety state     Cancer (HCC)     breast cancer 2018    Cerebellar stroke (HCC) 1/17/2025    Chronic obstructive pulmonary disease, unspecified (HCC) 05/04/2023    Chronic obstructive pulmonary disease, unspecified (HCC) 05/04/2023    COPD (chronic obstructive pulmonary disease) (HCC)     Cystic thyroid nodule 12/11/2024    Diabetes (HCC)     Diabetes mellitus (HCC)     Essential hypertension     Exposure to medical diagnostic radiation     High blood pressure     High cholesterol     History of blood transfusion 07/2022    low hemoglobin    Osteoarthritis     PONV (postoperative nausea and vomiting)     Pulmonary embolism (HCC)     Pulmonary emphysema (HCC)     Rheumatoid arthritis (HCC)          PAST SURGICAL HISTORY:  Past Surgical History:   Procedure Laterality Date    Cataract extraction w/  intraocular lens implant Bilateral 2017    Dr in [facility]     Colonoscopy      Colonoscopy N/A 07/21/2022    Procedure: COLONOSCOPY;  Surgeon: Mikey Garcia MD;  Location: [facility] ENDOSCOPY    Colonoscopy N/A 06/15/2023    Procedure: COLONOSCOPY;  Surgeon: Mikey Garcia MD;  Location: [facility] ENDOSCOPY    Correct bunion,simple      right foot  1993    Egd  12/21/2023    Dr. Garcia; Duodenal AVM's x4 cauterized    Hysterectomy      1972, no abnormal Paps, due to heavy bleeding with fibroids.  Not sure if it had bilateral salpingo-oophorectomy.    Ir aneurysm repairm  2010    Computer assisted volumetric craniofomy with clipping of anterior cerebral artery.    Lumpectomy right      Radiation right      Rotator cuff repair      1993    Total knee replacement Right 2010    Total knee replacement Left 07/02/2015    Transoral incisionless fundoplication - internal  01/25/2012 Fredy fundoplication at [facility] Dr. Fournier.    Fredy fundoplication at [facility] Dr. Fournier.    Trigger finger  release      left hand      Yag capsulotomy - ou - both eyes Bilateral 2021    done in [place]         PAST SOCIAL HISTORY:  Social History     Socioeconomic History    Marital status:    Tobacco Use    Smoking status: Former     Current packs/day: 0.00     Types: Cigarettes     Quit date:      Years since quittin.1     Passive exposure: Never    Smokeless tobacco: Never    Tobacco comments:     [id number]   Vaping Use    Vaping status: Never Used   Substance and Sexual Activity    Alcohol use: Never    Drug use: Never         PAST FAMILY HISTORY:  Family History   Problem Relation Age of Onset    Heart Surgery Mother         Leaky valve at 39 y/o.     Prostate Cancer Brother     Cancer Brother     Diabetes Maternal Grandmother     Diabetes Paternal Aunt     Breast Cancer Self 78    Breast Cancer Niece         before 50    Macular degeneration Neg     Glaucoma Neg          ALLERGIES:  Allergies[1]      MEDS:  Home Medications:  Medications Taking[2]    Scheduled Medication:   cefepime  1 g Intravenous Q12H    melatonin  3 mg Oral Nightly    aspirin  81 mg Oral Daily    carvedilol  25 mg Oral BID with meals    lidocaine-menthol  1 patch Transdermal Daily    midazolam  2 mg Intravenous Once    octreoTIDe  50 mcg Subcutaneous Q8H    pantoprazole  40 mg Intravenous Q12H    rosuvastatin  10 mg Oral Nightly    sennosides  8.6 mg Oral BID    cetirizine  5 mg Oral Daily    docusate sodium  100 mg Oral BID    fluticasone-salmeterol  1 puff Inhalation 2 times per day    gabapentin  100 mg Oral Nightly    montelukast  10 mg Oral Nightly    predniSONE  5 mg Oral Daily    losartan  50 mg Oral Daily    hydroxychloroquine  300 mg Oral Daily    insulin aspart  1-11 Units Subcutaneous TID CC    insulin degludec  10 Units Subcutaneous Nightly     Continuous Infusing Medication:   continuous dose heparin Stopped (25 0440)    sodium chloride Stopped (25 1630)     PRN Medications:    magnesium  hydroxide    bisacodyl    fleet enema    labetalol    glucose **OR** glucose **OR** glucose-vitamin C **OR** dextrose **OR** glucose **OR** glucose **OR** glucose-vitamin C    HYDROmorphone    acetaminophen    meclizine    acetaminophen **OR** acetaminophen    hydrALAzine    ondansetron    metoclopramide    acetaminophen    ondansetron    albuterol    cyclobenzaprine    oxyCODONE       PHYSICAL EXAM:  BP (!) 103/91 (BP Location: Left arm)   Pulse 83   Temp 98.6 °F (37 °C) (Temporal)   Resp 24   Ht 5' 1\" (1.549 m)   Wt 135 lb 9.6 oz (61.5 kg)   SpO2 90%   BMI 25.62 kg/m²      CONSTITUTIONAL: confused, no apparent distress  HEENT: atraumatic normocephalic  MOUTH: mucous membranes are moist. No OP exudates  NECK/THROAT: no JVD. Trachea midline. No obvious thyromegaly  LUNG: clear upper b/l no wheezing, crackles. Chest symmetric with respiratory motion  HEART: regular rate and rhythm, no obvious murmers or gallops noted  ABD: soft but tender. + bowel sounds. No organomegaly noted  EXT: no clubbing, cyanosis, or edema noted. Pulses intact grossly.  NEURO/MUSCULOSKELETAL: no obvious gross deficits  SKIN: warm, dry. No obvious lesions noted  LYMPH: no obvious LAD  Right port      IMAGES:   CXR 1/29/25  1. Cardiomegaly.  Tortuous atherosclerotic aorta.   2. Extensive bilateral mixed alveolar and interstitial multifocal airspace opacification with interval progression in the upper lobes.     CT abd/pelvis 1/24/25  CONCLUSION:   1. Small amount of ill-defined hematoma in left inguinal femoral region extending into left hemipelvis with slight haziness to the fat in the iliac fossa.  No large hematoma.   2. Contrast excretion into normal renal collecting systems and contrast distended urinary bladder from the recent angiogram.   3. Emphysema and scarring in both lungs.     CT brain repeat on 1/24/25  CONCLUSION:   1. Subarachnoid hemorrhage in the left frontal high convexity in bilateral occipital lobes with mild  cerebral edema. Continued surveillance is recommended.   2. Postoperative changes of frontal craniotomy and anterior parafalcine aneurysm clipping.   3. Senescent changes of parenchymal volume loss with sequela of chronic microvascular ischemic disease.   4. There is large vessel atherosclerosis involving the anterior and posterior intracranial circulations.   5. Lesser incidental findings as above.     LE dopplers 1/24/25  Negative for sonographic evidence of deep venous thrombosis in either lower extremity.     CT brain 1/24/25  CONCLUSION:   1. Retained contrast material throughout the large intracranial vessels and cerebral sulci from same date cardiac angiogram.  Please note that this finding limits evaluation for detection of potential acute intracranial hemorrhage.  Aforementioned sulcal    enhancement most likely relates to a contrast staining.  However, if symptoms or strong clinical suspicion for acute intracranial hemorrhage persist, a short interval 6-12 hour follow-up head CT is recommended.   2. Subcentimeter enhancing foci in the left cerebellum (x2) likely relate to known enhancing subacute lacunar type infarcts.  Continued follow-up to ensure resolution of enhancement is advised.   3. Midline frontal craniotomy and anterior parafalcine aneurysm clipping.   4. Intracranial atherosclerosis.   5. Stable chronic moderate adenoid enlargement.       MRI brain 1/19/25  CONCLUSION:   1. Interval development of a punctate lacunar infarct of the left thalamus, concerning for acute ischemia.   2. Subacute lacunar infarcts of the left cerebellar hemisphere.    3. Postoperative changes of frontal craniotomy and parafalcine aneurysm clipping.   4. Senescent changes of parenchymal volume loss with mild sequela of chronic microvascular ischemic disease.   5. Lesser incidental findings as above.     CT brain 1/19/25  1. Small focus of hypoattenuation involving the left cerebellar hemisphere, which corresponds to the  acute infarct demonstrated on the prior MRI brain dated 01/17/2025.   2. Otherwise, no transcortical area of hypoattenuation to suggest an acute infarct.  If there is clinical concern for an acute infarct, consider further evaluation with an MRI of the brain.   3. No acute intracranial hemorrhage, midline shift, mass effect, or hydrocephalus.   4. Redemonstrated postoperative changes from prior frontal craniotomy for aneurysm clipping along the course of the anterior cerebral arteries.  The associated metallic artifact from the aneurysm clips limits evaluation of the adjacent structures.   5. Lesser incidental findings described above.       LE dopplers 1/18/25  No DVT in either lower extremity.     MRI brain 1/18/25  CONCLUSION:   Limited 3-sequence stroke protocol study was performed. Within these parameters:   1. Acute to early subacute lacunar infarctions in the left cerebellar hemisphere.   2. Stable postoperative changes from a remote frontal craniotomy and anterior parafalcine aneurysm clipping.   3. Stable mild chronic microangiopathic ischemic changes.     CT C/A/P with contrast 1/17/25  1. Acute pulmonary emboli involving the bilateral lower lobe subsegmental pulmonary artery branches.   2. No evidence of aortic dissection.   3. Ascending thoracic aortic ectasia. Multifocal irregular partially ulcerated peripheral mural thrombus is observed throughout the thoracic and abdominal aorta.    4. Dilatation of the main pulmonary artery trunk may relate to underlying pulmonary hypertension.   5. Moderate to severe centrilobular/paraseptal emphysema with extensive postradiation fibrosis and chronic opacity of the right middle lobe.   6. Multifocal pleuroparenchymal scarring is apparent.   7. Proximal distal colonic diverticulosis without CT evidence of acute complication in the imaged volume.   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   9. Lesser incidental findings as above.     CTA brain  1/17/25  CONCLUSION:   1. Negative for hemodynamically significant stenosis or occlusion of the anterior or posterior circulations.    2. The internal carotid arteries demonstrate tortuosity proximally without evidence of hemodynamically stenosis or occlusion.   3. The vertebral arteries demonstrate contiguous patency bilaterally without evidence of flow-limiting stenosis.   4. 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 Thrombus extends into the proximal left subclavian artery origin, contributing to luminal narrowing.   5. Dilatation of the main pulmonary artery trunk may relate to underlying pulmonary hypertension.   6. 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    7. Lesser incidental findings as above.       LABS:  Recent Labs   Lab 01/29/25  2210 01/30/25  0348 01/30/25  1405 01/31/25  0356   RBC 2.84* 2.64*  --  3.18*  3.18*   HGB 7.3* 6.6* 9.0* 7.8*  7.8*   HCT 22.8* 21.3* 27.9* 24.9*  24.9*   MCV 80.3 80.7  --  78.3*  78.3*   MCH 25.7* 25.0*  --  24.5*  24.5*   MCHC 32.0 31.0  --  31.3  31.3   RDW 17.6* 17.7*  --  18.3*  18.3*   NEPRELIM 12.34* 7.97*  --  9.18*   WBC 15.1* 11.6*  --  13.6*  13.6*   .0 308.0  --  264.0  264.0       Recent Labs   Lab 01/29/25  0501 01/30/25  0348 01/31/25  0356   *  155* 159*  159* 192*  192*   BUN 11  11 20  20 21  21   CREATSERUM 1.03*  1.03* 1.03*  1.03* 0.99  0.99   EGFRCR 53*  53* 53*  53* 56*  56*   CA 9.5  9.5 9.3  9.3 9.6  9.6   ALB 3.2 3.2 3.2     142 139  139 138  138   K 4.0  4.0 3.7  3.7 3.7  3.7  3.7     110 107  107 106  106   CO2 23.0  23.0 24.0  24.0 25.0  25.0   ALKPHO 77 75 71   AST 21 21 19   ALT <7* <7* 7*   BILT 0.4 0.3 0.5   TP 6.5 6.2 6.2       ASSESSMENT/PLAN:  CVA x 2 and found to have left aortic arch and subclavian thrombus  -s/p stent placement on 1/24/25. Post op pt developed a severe HA   -imaging concerning for multiple embolic CVAs  -neurology  following  -SBP control/parameters as per neurology recommendations  -on low dose hep gtt stopped d/t Hg drop on 1/30/25    Acute b/l pulmonary emboli  -previous IVC filter removed  -LE dopplers neg on 1/18/25  -rechecked LE dopplers 1//24/25 remained neg  -possible IVC filter in the future    Left groin hematoma  -site appears soft  -hold anticoagulation  -defer to vascular further intervention    Hx of AVMs s/p cauterization in the past and on octreotide outpt infusions  -transfused 1 unit pRBC on 1/30/25. Repeat H/H improved  -GI following     CPFE  -advair  -supp 02 as needed    Klebsiella UTI  -on cefepime     DM  -insulin and accuchecks    Proph:  -DVT: SCDs      Thank you for the opportunity to care for Chester Bautista.      CHAU Roque DO, MPH  Pulmonary Critical Care Medicine  [facility]                       [1]   Allergies  Allergen Reactions    Celebrex [Celecoxib] PALPITATIONS    Penicillins ITCHING and SWELLING    Amlodipine OTHER (SEE COMMENTS)     Calves Swelling     Metformin And Related UNKNOWN    Olmesartan UNKNOWN   [2]   Current Facility-Administered Medications for the 1/17/25 encounter (Hospital Encounter)   Medication Dose Route Frequency Provider Last Rate Last Admin    Certolizumab Pegol  mg  400 mg Subcutaneous Q14 Days Ashia Goodrich MD   400 mg at 01/09/25 1225    octreoTIDe (Sandostatin LAR) IM injection 20 mg  20 mg Intramuscular Q30 Days Zoila Mcqueen MD         Outpatient Medications Marked as Taking for the 1/17/25 encounter (Hospital Encounter)   Medication Sig Dispense Refill    acetaminophen 325 MG Oral Tab Take 2 tablets (650 mg total) by mouth every 8 (eight) hours.      rosuvastatin 20 MG Oral Tab Take 1 tablet (20 mg total) by mouth nightly. 90 tablet 3    magnesium oxide 400 MG Oral Tab Take 1 tablet (400 mg total) by mouth in the morning, at noon, and at bedtime. Per Dr. JENSEN Mata      valsartan 80 MG Oral Tab 1   1/2 tabs every 12 hrs. (Patient taking differently: Take 1 tablet (80 mg total) by mouth 2 (two) times daily. 1  1/2 tabs every 12 hrs.) 270 tablet 3    loratadine 10 MG Oral Tab TAKE 1 TABLET BY MOUTH EVERY DAY 30 tablet 1    Potassium Chloride ER 20 MEQ Oral Tab CR Take 40 mEq by mouth every morning. 60 tablet 1    hydroxychloroquine 200 MG Oral Tab Take 1.5 tablets (300 mg total) by mouth daily. 135 tablet 1    predniSONE 5 MG Oral Tab Take 1 tablet (5 mg total) by mouth daily. 90 tablet 1    famotidine 20 MG Oral Tab Take 1 tablet (20 mg total) by mouth 2 (two) times daily. Patient gets over the counter      glipiZIDE 5 MG Oral Tab Take 1 tablet (5 mg total) by mouth every morning before breakfast. 90 tablet 0    albuterol 108 (90 Base) MCG/ACT Inhalation Aero Soln TAKE 2 PUFFS BY MOUTH EVERY 4 TO 6 HOURS AS NEEDED 6.7 each 5    cyclobenzaprine 10 MG Oral Tab Take 1 tablet (10 mg total) by mouth nightly as needed. 90 tablet 1    insulin glargine (LANTUS SOLOSTAR) 100 UNIT/ML Subcutaneous Solution Pen-injector Inject 10 Units into the skin daily with dinner. (Patient taking differently: Inject 14 Units into the skin daily with dinner.) 3 mL 1    montelukast 10 MG Oral Tab Take 1 tablet (10 mg total) by mouth nightly. 90 tablet 3    carvedilol 12.5 MG Oral Tab Take 1 tablet (12.5 mg total) by mouth 2 (two) times daily with meals. 180 tablet 3    fluticasone-salmeterol (WIXELA INHUB) 250-50 MCG/ACT Inhalation Aerosol Powder, Breath Activated Inhale 1 puff into the lungs every 12 (twelve) hours. 1 each 5    gabapentin 100 MG Oral Cap Take 1 capsule (100 mg total) by mouth nightly. 90 capsule 3

## 2025-01-31 NOTE — PLAN OF CARE
Patient was up in the chair couple of times, walked with PT. 1 unit PRBC given as ordered, repeat hematocrit and Hgb on chart. Compression dressing applied on the rt upper chest/ port a cath site after needle was removed.     Problem: Diabetes/Glucose Control  Goal: Glucose maintained within prescribed range  Description: INTERVENTIONS:  - Monitor Blood Glucose as ordered  - Assess for signs and symptoms of hyperglycemia and hypoglycemia  - Administer ordered medications to maintain glucose within target range  - Assess barriers to adequate nutritional intake and initiate nutrition consult as needed  - Instruct patient on self management of diabetes  Outcome: Progressing     Problem: CARDIOVASCULAR - ADULT  Goal: Maintains optimal cardiac output and hemodynamic stability  Description: INTERVENTIONS:  - Monitor vital signs, rhythm, and trends  - Monitor for bleeding, hypotension and signs of decreased cardiac output  - Evaluate effectiveness of vasoactive medications to optimize hemodynamic stability  - Monitor arterial and/or venous puncture sites for bleeding and/or hematoma  - Assess quality of pulses, skin color and temperature  - Assess for signs of decreased coronary artery perfusion - ex. Angina  - Evaluate fluid balance, assess for edema, trend weights  Outcome: Progressing  Goal: Absence of cardiac arrhythmias or at baseline  Description: INTERVENTIONS:  - Continuous cardiac monitoring, monitor vital signs, obtain 12 lead EKG if indicated  - Evaluate effectiveness of antiarrhythmic and heart rate control medications as ordered  - Initiate emergency measures for life threatening arrhythmias  - Monitor electrolytes and administer replacement therapy as ordered  Outcome: Progressing     Problem: METABOLIC/FLUID AND ELECTROLYTES - ADULT  Goal: Glucose maintained within prescribed range  Description: INTERVENTIONS:  - Monitor Blood Glucose as ordered  - Assess for signs and symptoms of hyperglycemia and  hypoglycemia  - Administer ordered medications to maintain glucose within target range  - Assess barriers to adequate nutritional intake and initiate nutrition consult as needed  - Instruct patient on self management of diabetes  Outcome: Progressing  Goal: Electrolytes maintained within normal limits  Description: INTERVENTIONS:  - Monitor labs and rhythm and assess patient for signs and symptoms of electrolyte imbalances  - Administer electrolyte replacement as ordered  - Monitor response to electrolyte replacements, including rhythm and repeat lab results as appropriate  - Fluid restriction as ordered  - Instruct patient on fluid and nutrition restrictions as appropriate  Outcome: Progressing  Goal: Hemodynamic stability and optimal renal function maintained  Description: INTERVENTIONS:  - Monitor labs and assess for signs and symptoms of volume excess or deficit  - Monitor intake, output and patient weight  - Monitor urine specific gravity, serum osmolarity and serum sodium as indicated or ordered  - Monitor response to interventions for patient's volume status, including labs, urine output, blood pressure (other measures as available)  - Encourage oral intake as appropriate  - Instruct patient on fluid and nutrition restrictions as appropriate  Outcome: Progressing     Problem: NEUROLOGICAL - ADULT  Goal: Achieves stable or improved neurological status  Description: INTERVENTIONS  - Assess for and report changes in neurological status  - Initiate measures to prevent increased intracranial pressure  - Maintain blood pressure and fluid volume within ordered parameters to optimize cerebral perfusion and minimize risk of hemorrhage  - Monitor temperature, glucose, and sodium. Initiate appropriate interventions as ordered  Outcome: Progressing  Goal: Achieves maximal functionality and self care  Description: INTERVENTIONS  - Monitor swallowing and airway patency with patient fatigue and changes in neurological  status  - Encourage and assist patient to increase activity and self care with guidance from PT/OT  - Encourage visually impaired, hearing impaired and aphasic patients to use assistive/communication devices  Outcome: Progressing     Problem: MUSCULOSKELETAL - ADULT  Goal: Return mobility to safest level of function  Description: INTERVENTIONS:  - Assess patient stability and activity tolerance for standing, transferring and ambulating w/ or w/o assistive devices  - Assist with transfers and ambulation using safe patient handling equipment as needed  - Ensure adequate protection for wounds/incisions during mobilization  - Obtain PT/OT consults as needed  - Advance activity as appropriate  - Communicate ordered activity level and limitations with patient/family  Outcome: Progressing

## 2025-01-31 NOTE — PLAN OF CARE
Problem: HEMATOLOGIC - ADULT  Goal: Free from bleeding injury  Description: (Example usage: patient with low platelets)  INTERVENTIONS:  - Avoid intramuscular injections, enemas and rectal medication administration  - Ensure safe mobilization of patient  - Hold pressure on venipuncture sites to achieve adequate hemostasis  - Assess for signs and symptoms of internal bleeding  - Monitor lab trends  - Patient is to report abnormal signs of bleeding to staff  - Avoid use of toothpicks and dental floss  - Use electric shaver for shaving  - Use soft bristle tooth brush  - Limit straining and forceful nose blowing  Outcome: Progressing     Problem: Patient Centered Care  Goal: Patient preferences are identified and integrated in the patient's plan of care  Description: Interventions:  - What would you like us to know as we care for you? From home with daughter   - Provide timely, complete, and accurate information to patient/family  - Incorporate patient and family knowledge, values, beliefs, and cultural backgrounds into the planning and delivery of care  - Encourage patient/family to participate in care and decision-making at the level they choose  - Honor patient and family perspectives and choices  Outcome: Progressing     Problem: Diabetes/Glucose Control  Goal: Glucose maintained within prescribed range  Description: INTERVENTIONS:  - Monitor Blood Glucose as ordered  - Assess for signs and symptoms of hyperglycemia and hypoglycemia  - Administer ordered medications to maintain glucose within target range  - Assess barriers to adequate nutritional intake and initiate nutrition consult as needed  - Instruct patient on self management of diabetes  Outcome: Progressing     Problem: Patient/Family Goals  Goal: Patient/Family Long Term Goal  Description: Patient's Long Term Goal: discharge from hospital     Interventions:  - monitor vital signs   - monitor blood glucose levels  - monitor appropriate labs  - pain  management   - administer medications per order  - follow MD orders  - diagnostics per order  - update and inform patient and family on plan of care  - discharge planning  - See additional Care Plan goals for specific interventions  Outcome: Progressing  Goal: Patient/Family Short Term Goal  Description: Patient's Short Term Goal: manage pain     Interventions:   - monitor vital signs   - monitor blood glucose levels  - monitor appropriate labs  - pain management   - administer medications per order  - follow MD orders  - diagnostics per order  - update and inform patient and family on plan of care  - See additional Care Plan goals for specific interventions  Outcome: Progressing     Problem: CARDIOVASCULAR - ADULT  Goal: Maintains optimal cardiac output and hemodynamic stability  Description: INTERVENTIONS:  - Monitor vital signs, rhythm, and trends  - Monitor for bleeding, hypotension and signs of decreased cardiac output  - Evaluate effectiveness of vasoactive medications to optimize hemodynamic stability  - Monitor arterial and/or venous puncture sites for bleeding and/or hematoma  - Assess quality of pulses, skin color and temperature  - Assess for signs of decreased coronary artery perfusion - ex. Angina  - Evaluate fluid balance, assess for edema, trend weights  Outcome: Progressing  Goal: Absence of cardiac arrhythmias or at baseline  Description: INTERVENTIONS:  - Continuous cardiac monitoring, monitor vital signs, obtain 12 lead EKG if indicated  - Evaluate effectiveness of antiarrhythmic and heart rate control medications as ordered  - Initiate emergency measures for life threatening arrhythmias  - Monitor electrolytes and administer replacement therapy as ordered  Outcome: Progressing     Problem: METABOLIC/FLUID AND ELECTROLYTES - ADULT  Goal: Glucose maintained within prescribed range  Description: INTERVENTIONS:  - Monitor Blood Glucose as ordered  - Assess for signs and symptoms of hyperglycemia and  hypoglycemia  - Administer ordered medications to maintain glucose within target range  - Assess barriers to adequate nutritional intake and initiate nutrition consult as needed  - Instruct patient on self management of diabetes  Outcome: Progressing  Goal: Electrolytes maintained within normal limits  Description: INTERVENTIONS:  - Monitor labs and rhythm and assess patient for signs and symptoms of electrolyte imbalances  - Administer electrolyte replacement as ordered  - Monitor response to electrolyte replacements, including rhythm and repeat lab results as appropriate  - Fluid restriction as ordered  - Instruct patient on fluid and nutrition restrictions as appropriate  Outcome: Progressing  Goal: Hemodynamic stability and optimal renal function maintained  Description: INTERVENTIONS:  - Monitor labs and assess for signs and symptoms of volume excess or deficit  - Monitor intake, output and patient weight  - Monitor urine specific gravity, serum osmolarity and serum sodium as indicated or ordered  - Monitor response to interventions for patient's volume status, including labs, urine output, blood pressure (other measures as available)  - Encourage oral intake as appropriate  - Instruct patient on fluid and nutrition restrictions as appropriate  Outcome: Progressing     Problem: NEUROLOGICAL - ADULT  Goal: Achieves stable or improved neurological status  Description: INTERVENTIONS  - Assess for and report changes in neurological status  - Initiate measures to prevent increased intracranial pressure  - Maintain blood pressure and fluid volume within ordered parameters to optimize cerebral perfusion and minimize risk of hemorrhage  - Monitor temperature, glucose, and sodium. Initiate appropriate interventions as ordered  Outcome: Progressing  Goal: Achieves maximal functionality and self care  Description: INTERVENTIONS  - Monitor swallowing and airway patency with patient fatigue and changes in neurological  status  - Encourage and assist patient to increase activity and self care with guidance from PT/OT  - Encourage visually impaired, hearing impaired and aphasic patients to use assistive/communication devices  Outcome: Progressing     Problem: PAIN - ADULT  Goal: Verbalizes/displays adequate comfort level or patient's stated pain goal  Description: INTERVENTIONS:  - Encourage pt to monitor pain and request assistance  - Assess pain using appropriate pain scale  - Administer analgesics based on type and severity of pain and evaluate response  - Implement non-pharmacological measures as appropriate and evaluate response  - Consider cultural and social influences on pain and pain management  - Manage/alleviate anxiety  - Utilize distraction and/or relaxation techniques  - Monitor for opioid side effects  - Notify MD/LIP if interventions unsuccessful or patient reports new pain  - Anticipate increased pain with activity and pre-medicate as appropriate  Outcome: Progressing     Problem: SAFETY ADULT - FALL  Goal: Free from fall injury  Description: INTERVENTIONS:  - Assess pt frequently for physical needs  - Identify cognitive and physical deficits and behaviors that affect risk of falls.  - [place] fall precautions as indicated by assessment.  - Educate pt/family on patient safety including physical limitations  - Instruct pt to call for assistance with activity based on assessment  - Modify environment to reduce risk of injury  - Provide assistive devices as appropriate  - Consider OT/PT consult to assist with strengthening/mobility  - Encourage toileting schedule  Outcome: Progressing     Problem: DISCHARGE PLANNING  Goal: Discharge to home or other facility with appropriate resources  Description: INTERVENTIONS:  - Identify barriers to discharge w/pt and caregiver  - Include patient/family/discharge partner in discharge planning  - Arrange for needed discharge resources and transportation as appropriate  - Identify  discharge learning needs (meds, wound care, etc)  - Arrange for interpreters to assist at discharge as needed  - Consider post-discharge preferences of patient/family/discharge partner  - Complete POLST form as appropriate  - Assess patient's ability to be responsible for managing their own health  - Refer to Case Management Department for coordinating discharge planning if the patient needs post-hospital services based on physician/LIP order or complex needs related to functional status, cognitive ability or social support system  Outcome: Progressing     Problem: SKIN/TISSUE INTEGRITY - ADULT  Goal: Skin integrity remains intact  Description: INTERVENTIONS  - Assess and document risk factors for pressure ulcer development  - Assess and document skin integrity  - Monitor for areas of redness and/or skin breakdown  - Initiate interventions, skin care algorithm/standards of care as needed  Outcome: Progressing     Problem: MUSCULOSKELETAL - ADULT  Goal: Return mobility to safest level of function  Description: INTERVENTIONS:  - Assess patient stability and activity tolerance for standing, transferring and ambulating w/ or w/o assistive devices  - Assist with transfers and ambulation using safe patient handling equipment as needed  - Ensure adequate protection for wounds/incisions during mobilization  - Obtain PT/OT consults as needed  - Advance activity as appropriate  - Communicate ordered activity level and limitations with patient/family  Outcome: Progressing    Problem: Impaired Swallowing  Goal: Minimize aspiration risk  Description: Interventions:  - Patient should be alert and upright for all feedings (90 degrees preferred)  - Offer food and liquids at a slow rate  - No straws  - Encourage small bites of food and small sips of liquid  - Offer pills one at a time, crush or deliver with applesauce as needed  - Discontinue feeding and notify MD (or speech pathologist) if coughing or persistent throat clearing or  wet/gurgly vocal quality is noted  Outcome: Progressing

## 2025-01-31 NOTE — PHYSICAL THERAPY NOTE
PHYSICAL THERAPY TREATMENT NOTE - INPATIENT     Room Number: 217/217-A       Presenting Problem: cerebellar CVA  Co-Morbidities : COPD, diabetes, HTN, RA, GI bleed    Problem List  Principal Problem:    Cerebellar stroke (HCC)  Active Problems:    AVM (arteriovenous malformation) of small bowel, acquired with hemorrhage    Dizziness    Right hand paresthesia    Thrombus of aorta (HCC)    Bilateral pulmonary embolism (HCC)    Acute cystitis without hematuria    Small bowel bleed not requiring more than 4 units of blood in 24 hours, ICU, or surgery    Acute blood loss anemia    Cerebrovascular accident (CVA) due to embolism of precerebral artery (HCC)    Melena    Visual hallucinations      PHYSICAL THERAPY ASSESSMENT   Patient demonstrates fair progress this session, goals  remain in progress.      Patient is requiring minimal assist and moderate assist as a result of the following impairments: decreased functional strength and impaired dynamic balance. Patient has more dizziness and weakness today but vitals are stable.    Patient continues to function below baseline with bed mobility, transfers, and gait.  Next session anticipate patient to progress bed mobility, transfers, and gait.  Physical Therapy will continue to follow patient for duration of hospitalization.    Patient continues to benefit from continued skilled PT services: to facilitate return to prior level of function as patient demonstrates high motivation with excellent tolerance to an intensive therapy program .    PLAN DURING HOSPITALIZATION  Nursing Mobility Recommendation : 1 Assist  PT Device Recommendation: Rolling walker;Gait belt  PT Treatment Plan: Bed mobility;Patient education;Family education;Gait training  Frequency (Obs): 5x/week     SUBJECTIVE  \"What just happened\"    OBJECTIVE  Precautions: Bed/chair alarm    WEIGHT BEARING RESTRICTION       PAIN ASSESSMENT   Ratin  Location: denies  Management Techniques: Activity promotion;Body  mechanics;Repositioning (gentle/light soft tissue mobilization)    BALANCE  Static Sitting: Fair  Dynamic Sitting: Fair -  Static Standing: Poor +  Dynamic Standing: Poor    ACTIVITY TOLERANCE  Pulse: 89        BP: 137/69              O2 WALK  Oxygen Therapy  SPO2% on Room Air at Rest: 99  SPO2% Ambulation on Room Air: 93    AM-PAC '6-Clicks' INPATIENT SHORT FORM - BASIC MOBILITY  How much difficulty does the patient currently have...  Patient Difficulty: Turning over in bed (including adjusting bedclothes, sheets and blankets)?: A Little   Patient Difficulty: Sitting down on and standing up from a chair with arms (e.g., wheelchair, bedside commode, etc.): A Little   Patient Difficulty: Moving from lying on back to sitting on the side of the bed?: A Little   How much help from another person does the patient currently need...   Help from Another: Moving to and from a bed to a chair (including a wheelchair)?: A Little   Help from Another: Need to walk in hospital room?: A Lot   Help from Another: Climbing 3-5 steps with a railing?: Total     AM-PAC Score:  Raw Score: 15   Approx Degree of Impairment: 57.7%   Standardized Score (AM-PAC Scale): 39.45   CMS Modifier (G-Code): CK    FUNCTIONAL ABILITY STATUS  Functional Mobility/Gait Assessment  Gait Assistance: Minimum assistance;Other (Comment) (mod A after sitting on toilet)  Distance (ft): 15, 5  Assistive Device: Rolling walker  Pattern: Shuffle  Rolling: minimal assist  Supine to Sit: minimal assist  Sit to Supine: minimal assist  Sit to Stand: moderate assist    Skilled Therapy Provided: transfer training, gait training    The patient's Approx Degree of Impairment: 57.7% has been calculated based on documentation in the [facility] '6 clicks' Inpatient Daily Activity Short Form.  Research supports that patients with this level of impairment may benefit from rehab.  Final disposition will be made by interdisciplinary medical team.      Patient End of Session: Up in  chair    CURRENT GOALS   Goals to be met by: 2/3/25  Patient Goal Patient's self-stated goal is: go home, be stronger   Goal #1 Patient is able to demonstrate supine - sit EOB @ level: modified independent      Goal #1   Current Status Not met   Goal #2 Patient is able to demonstrate transfers Sit to/from Stand at assistance level: modified independent with walker - rolling      Goal #2  Current Status Not met   Goal #3 Patient is able to ambulate 150 feet with assist device: walker - rolling at assistance level: supervision   Goal #3   Current Status Not met min - mod A   Goal #4 Patient will negotiate 10 stairs with rail and supervision   Goal #4   Current Status Not tested   Goal #5 Patient to demonstrate independence with home activity/exercise instructions provided to patient in preparation for discharge.   Goal #5   Current Status progressing     Gait: 11 minutes  Therapeutic Activity: 13 minutes

## 2025-01-31 NOTE — PLAN OF CARE
Problem: HEMATOLOGIC - ADULT  Goal: Free from bleeding injury  Description: (Example usage: patient with low platelets)  INTERVENTIONS:  - Avoid intramuscular injections, enemas and rectal medication administration  - Ensure safe mobilization of patient  - Hold pressure on venipuncture sites to achieve adequate hemostasis  - Assess for signs and symptoms of internal bleeding  - Monitor lab trends  - Patient is to report abnormal signs of bleeding to staff  - Avoid use of toothpicks and dental floss  - Use electric shaver for shaving  - Use soft bristle tooth brush  - Limit straining and forceful nose blowing  Outcome: Progressing     Problem: Diabetes/Glucose Control  Goal: Glucose maintained within prescribed range  Description: INTERVENTIONS:  - Monitor Blood Glucose as ordered  - Assess for signs and symptoms of hyperglycemia and hypoglycemia  - Administer ordered medications to maintain glucose within target range  - Assess barriers to adequate nutritional intake and initiate nutrition consult as needed  - Instruct patient on self management of diabetes  Outcome: Progressing     Problem: NEUROLOGICAL - ADULT  Goal: Achieves stable or improved neurological status  Description: INTERVENTIONS  - Assess for and report changes in neurological status  - Initiate measures to prevent increased intracranial pressure  - Maintain blood pressure and fluid volume within ordered parameters to optimize cerebral perfusion and minimize risk of hemorrhage  - Monitor temperature, glucose, and sodium. Initiate appropriate interventions as ordered  Outcome: Progressing     Problem: PAIN - ADULT  Goal: Verbalizes/displays adequate comfort level or patient's stated pain goal  Description: INTERVENTIONS:  - Encourage pt to monitor pain and request assistance  - Assess pain using appropriate pain scale  - Administer analgesics based on type and severity of pain and evaluate response  - Implement non-pharmacological measures as  appropriate and evaluate response  - Consider cultural and social influences on pain and pain management  - Manage/alleviate anxiety  - Utilize distraction and/or relaxation techniques  - Monitor for opioid side effects  - Notify MD/LIP if interventions unsuccessful or patient reports new pain  - Anticipate increased pain with activity and pre-medicate as appropriate  Outcome: Progressing     Problem: SAFETY ADULT - FALL  Goal: Free from fall injury  Description: INTERVENTIONS:  - Assess pt frequently for physical needs  - Identify cognitive and physical deficits and behaviors that affect risk of falls.  - [place] fall precautions as indicated by assessment.  - Educate pt/family on patient safety including physical limitations  - Instruct pt to call for assistance with activity based on assessment  - Modify environment to reduce risk of injury  - Provide assistive devices as appropriate  - Consider OT/PT consult to assist with strengthening/mobility  - Encourage toileting schedule  Outcome: Progressing

## 2025-01-31 NOTE — PROGRESS NOTES
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    Heparin gtt has been off since 0440 on 2025  Last Brillinta 25 at 0037  She has been receiving ASA 81 mg.    Per neurology- ok to stop heparin gtt, continue ASA/Brillinta.    Will d/w Dr Roque for anticoagulation thoughts for PE vs IVC filter-  Dr Roque recommending permanent IVC filter. IR notified of consult.       JEZ ALLEN APRN/CNP  Acute Care Nurse Practitioner  2025  1300                                Jez KHAN/CNP  Critical Care  2025   12:43 PM

## 2025-01-31 NOTE — PROGRESS NOTES
Progress Note     Chester Bautista Patient Status:  Inpatient    1940 MRN [id number]   Location [facility] 2W/SW Attending Sonya Lozano MD   Hosp Day # 13 PCP Heike Sorto MD     Chief Complaint: patient presented with No chief complaint on file.      Subjective:   S: Patient is doing fine, no issues reported per RN. She is awake, alert, following commands    Review of Systems:   10 point ROS completed and was negative, except for pertinent positive and negatives stated in subjective.    Objective:   Vital signs:  Temp:  [97 °F (36.1 °C)-98.6 °F (37 °C)] 98.6 °F (37 °C)  Pulse:  [83-97] 83  Resp:  [16-38] 24  BP: (103-170)/(55-93) 103/91  SpO2:  [89 %-98 %] 90 %    Wt Readings from Last 6 Encounters:   25 135 lb 9.6 oz (61.5 kg)   25 147 lb 14.9 oz (67.1 kg)   25 143 lb (64.9 kg)   25 139 lb (63 kg)   24 146 lb (66.2 kg)   24 144 lb 12.8 oz (65.7 kg)         Physical Exam:    General: No acute distress. Alert ,   central venous catheter in place     Respiratory: Clear to auscultation bilaterally. No wheezes. No rhonchi.  Cardiovascular: S1, S2. Regular rate and rhythm. No murmurs, rubs or gallops.   Abdomen: Soft, nontender, nondistended.  Positive bowel sounds. No rebound or guarding.  Neurologic: No focal neurological deficits.   Musculoskeletal: Moves all extremities.  Extremities: No edema.    Results:   Diagnostic Data:      Labs:    Labs Last 24 Hours:   BMP     CBC    Other     Na 138; 138 Cl 106; 106 BUN 21; 21 Glu 192; 192   Hb 7.8; 7.8   PTT - Procal -   K 3.7; 3.7; 3.7 CO2 25.0; 25.0 Cr 0.99; 0.99   WBC 13.6; 13.6 >< .0; 264.0  INR - CRP -   Renal Lytes Endo    Hct 24.9; 24.9   Trop - D dim -   eGFR - Ca 9.6; 9.6 POC Gluc  204    LFT   pBNP - Lactic -   eGFR AA - PO4 - A1c -   AST 19 APk 71 Prot 6.2  LDL -     Mg 1.5 TSH -   ALT 7 T russell 0.5 Alb 3.2        COVID-19 Lab Results    COVID-19  Lab Results   Component Value Date     COVID19 Not Detected 01/17/2025    COVID19 Not Detected 02/02/2024    COVID19 Not Detected 12/07/2023       Pro-Calcitonin  No results for input(s): \"PCT\" in the last 168 hours.    Cardiac  No results for input(s): \"TROP\", \"PBNP\" in the last 168 hours.    Creatinine Kinase  No results for input(s): \"CK\" in the last 168 hours.    Inflammatory Markers  Recent Labs   Lab 01/25/25  0332   DDIMER 2.77*       Imaging: Imaging data reviewed in Epic.    Medications:    cefepime  1 g Intravenous Q12H    melatonin  3 mg Oral Nightly    aspirin  81 mg Oral Daily    carvedilol  25 mg Oral BID with meals    lidocaine-menthol  1 patch Transdermal Daily    midazolam  2 mg Intravenous Once    octreoTIDe  50 mcg Subcutaneous Q8H    pantoprazole  40 mg Intravenous Q12H    rosuvastatin  10 mg Oral Nightly    sennosides  8.6 mg Oral BID    cetirizine  5 mg Oral Daily    docusate sodium  100 mg Oral BID    fluticasone-salmeterol  1 puff Inhalation 2 times per day    gabapentin  100 mg Oral Nightly    montelukast  10 mg Oral Nightly    predniSONE  5 mg Oral Daily    losartan  50 mg Oral Daily    hydroxychloroquine  300 mg Oral Daily    insulin aspart  1-11 Units Subcutaneous TID CC    insulin degludec  10 Units Subcutaneous Nightly       Assessment & Plan:   ASSESSMENT / PLAN:     Problem List Items Addressed This Visit          HCC Problems    * (Principal) Cerebellar stroke (HCC) - Primary    Relevant Medications    labetalol (Trandate) 5 mg/mL injection 20 mg (Completed)    labetalol (Trandate) 5 mg/mL injection 10 mg (Completed)    hydrALAzine (Apresoline) 20 mg/mL injection 10 mg    gabapentin (Neurontin) cap 100 mg    losartan (Cozaar) tab 50 mg    alteplase (Activase) 2 mg in sterile water for injection (PF) 2.2 mL IV push to declot line (Completed)    clopidogrel (Plavix) tab 150 mg (Completed)    rosuvastatin (Crestor) tab 10 mg    LORazepam (Ativan) tab 0.5 mg (Completed)    heparin (Porcine) 1000 UNIT/ML injection (Completed)     midazolam (Versed) 2 MG/2ML injection (Completed)    midazolam (Versed) 2 MG/2ML injection (Completed)    protamine 10 mg/mL injection (Completed)    ticagrelor (Brilinta) 90 MG tab (Completed)    magnesium sulfate in sterile water for injection 2 g/50mL IVPB premix 2 g (Completed)    midazolam (Versed) 2 MG/2ML injection 2 mg    midazolam (Versed) 2 MG/2ML injection (Completed)    magnesium sulfate in sterile water for injection 2 g/50mL IVPB premix 2 g (Completed)    aspirin DR tab 81 mg    labetalol (Trandate) 5 mg/mL injection 10 mg    carvedilol (Coreg) tab 25 mg    magnesium sulfate in sterile water for injection 2 g/50mL IVPB premix 2 g (Completed)    midazolam (Versed) 2 MG/2ML injection (Completed)    magnesium sulfate 4 g/100mL IVPB premix 4 g (Completed)    Thrombus of aorta (HCC)    Relevant Medications    alteplase (Activase) 2 mg in sterile water for injection (PF) 2.2 mL IV push to declot line (Completed)    clopidogrel (Plavix) tab 150 mg (Completed)    heparin (Porcine) 1000 UNIT/ML injection (Completed)    ticagrelor (Brilinta) 90 MG tab (Completed)    lidocaine-menthol 4-1 % patch 1 patch    aspirin DR tab 81 mg    Bilateral pulmonary embolism (HCC)    Relevant Medications    alteplase (Activase) 2 mg in sterile water for injection (PF) 2.2 mL IV push to declot line (Completed)    clopidogrel (Plavix) tab 150 mg (Completed)    heparin in sodium chloride 0.9% (Porcine) 2 Units/mL flush bag premix (Completed)    heparin (Porcine) 1000 UNIT/ML injection (Completed)    heparin in sodium chloride 0.9% (Porcine) 2 Units/mL flush bag premix (Completed)    protamine 10 mg/mL injection (Completed)    ticagrelor (Brilinta) 90 MG tab (Completed)    heparin (Porcine) 84520 units/250 mL infusion (STROKE/NEURO) INITIAL DOSE (Completed)    heparin (Porcine) 25189 units/250mL infusion STROKE/NEURO CONTINUOUS    aspirin DR tab 81 mg    heparin in sodium chloride 0.9% (Porcine) 2 Units/mL flush bag premix  (Completed)    heparin (Porcine) 100 Units/mL lock flush (Completed)    heparin (Porcine) 100 Units/mL lock flush (Completed)       Neuro    Right hand paresthesia    Relevant Medications    gabapentin (Neurontin) cap 100 mg    LORazepam (Ativan) tab 0.5 mg (Completed)    midazolam (Versed) 2 MG/2ML injection (Completed)    midazolam (Versed) 2 MG/2ML injection (Completed)    magnesium sulfate in sterile water for injection 2 g/50mL IVPB premix 2 g (Completed)    midazolam (Versed) 2 MG/2ML injection 2 mg    midazolam (Versed) 2 MG/2ML injection (Completed)    magnesium sulfate in sterile water for injection 2 g/50mL IVPB premix 2 g (Completed)    magnesium sulfate in sterile water for injection 2 g/50mL IVPB premix 2 g (Completed)    midazolam (Versed) 2 MG/2ML injection (Completed)    melatonin tab 3 mg    magnesium sulfate 4 g/100mL IVPB premix 4 g (Completed)       Genitourinary and Reproductive    Acute cystitis without hematuria    Relevant Medications    labetalol (Trandate) 5 mg/mL injection 20 mg (Completed)    labetalol (Trandate) 5 mg/mL injection 10 mg (Completed)    hydrALAzine (Apresoline) 20 mg/mL injection 10 mg    losartan (Cozaar) tab 50 mg    potassium chloride 20 mEq/100mL IVPB premix 20 mEq (Completed)    potassium chloride (Klor-Con M20) tab 40 mEq (Completed)    magnesium oxide (Mag-Ox) tab 800 mg (Completed)    magnesium oxide (Mag-Ox) tab 400 mg (Completed)    potassium chloride (Klor-Con M20) tab 40 mEq (Completed)    magnesium oxide (Mag-Ox) tab 800 mg (Completed)    sodium chloride 0.9% infusion    sodium chloride 0.9% infusion (Completed)    pantoprazole (Protonix) 40 mg in sodium chloride 0.9% PF 10 mL IV push    sodium chloride 0.9% infusion (Completed)    magnesium sulfate in sterile water for injection 2 g/50mL IVPB premix 2 g (Completed)    potassium chloride 40 mEq in 250mL sodium chloride 0.9% IVPB premix (Completed)    potassium chloride 40 mEq in 250mL sodium chloride 0.9% IVPB  premix (Completed)    potassium chloride 20 mEq/100mL IVPB premix 20 mEq (Completed)    magnesium sulfate in sterile water for injection 2 g/50mL IVPB premix 2 g (Completed)    potassium chloride 20 mEq/100mL IVPB premix 20 mEq (Completed)    potassium chloride (Klor-Con) 20 MEQ oral powder 40 mEq (Completed)    magnesium oxide (Mag-Ox) tab 800 mg (Completed)    potassium chloride (Klor-Con M20) tab 40 mEq (Completed)    magnesium oxide (Mag-Ox) tab 400 mg (Completed)    labetalol (Trandate) 5 mg/mL injection 10 mg    carvedilol (Coreg) tab 25 mg    magnesium sulfate in sterile water for injection 2 g/50mL IVPB premix 2 g (Completed)    sodium chloride 0.9% infusion (Completed)    potassium chloride 20 mEq/100mL IVPB premix 20 mEq (Completed)    magnesium oxide (Mag-Ox) tab 400 mg (Completed)    ceFEPIme (Maxipime) 1 g in sodium chloride 0.9% 100 mL IVPB-MBP    magnesium sulfate 4 g/100mL IVPB premix 4 g (Completed)    potassium chloride (Klor-Con M20) tab 40 mEq (Completed)       Symptoms and Signs    Dizziness         #Anemia  -in the setting of PE and aortic arch thrombus, the decision was made to resume anticoagulation despite the patients history of bleeding AVMs  -monitor H&H, transfuse as indicated  -GI on consult                Start octreotide 50 mcg TID  Continue Brilinta as the benefit of AC are >> risks of bleeding  Possible endoscopic eval vs transfer to a tertiary care center for balloon enteroscopy and colonoscopy  Currently on a heparin drip  Monitor H&H  -transfused 1U pRBC (total 2U pRBC and 1U platelets since admission)  1/31: HB stable today, continue to monitor      #Left Cerebellar Infarcts  #SAH  - pt with new sudden onset neuro changes  - 2 small cerebellar infarcts seen on admission MRI  -Patient already took 3 baby aspirin's prior to coming to ED   - CTA negative for LVO, but showing small subsegmental Pes  - Stroke alert called 1/19 -> stroke CT non-acute, MRI showing small CVA in L  thalamus   - f/u lipid panel, A1c, TSH, ECHO   - Neuro on consult  -Continue statin  -Brillinta resumed 01/26/2025  -high risk morbidity/mortality given her history of GI bleed and now need for antiplatelet therapy and potential anticoagulation in the future. Family is aware  -gradually lower BP  -Vascular surgery on consult  -s/p stent placement 10/24/2025  -Brilinta x6 months  -CT head reviewed, repeat CT pending  -Appreciate further neuro and vascular surgery input  -continue present management  1/31: d/w Dr Kang (neuro)recommends DC heparin, dc Asp, Resume Brilinta      #Dizziness  Improved   -Likely in setting of above  -Meclizine prn   -Neurology consulted                -continue brilinta and statin     #PE   -Patient is hemodynamically stable, EKG NSR and troponin not elevated.  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 Already took 3 baby aspirins prior to admission  -Previously had IVC filter placed in March 2024 for PE at that time (removed June 2024).   -Needs eval for hypercoagulable state. Risk factors of recent car ride, overall sedentary state, breast cancer. Sees Dr. Cade Chu in clinic for anemia, needs further follow up   -given current presentation the patient was restarted on AC, close Hb monitoring  -heparin drip at this time, change to PO at the time of discharge  1/31: heparin is dc. Pulmonary following      #Descending aortic arch thrombus   -Admission CTA chest revealing mural thrombus throughout the thoracic and abdominal aorta, chronic per radiology  -Vascular surgery on consult  -planning for angiogram 01/24/2025  -cont brilinta and statin for now      #HTN  -Hold home antihypertensives to allow for permissive hypertension poststroke     #Klebsiella UTI  -nitrofurantoin initiated in ED, UCx reviewed - nitrofurantoin resistant  -started on IV Cefepime - complete course of treatment  -deescalate abx as indicated      #Constipation  -bowel regimen ordered      Chronic Medical Problems  HTN  HLD  DM  COPD/?sarcoidosis   Rheumatoid Arthritis  DVT/PE s/p IVC which has been removed   CKD3  Anemia - in setting of bleeding AVMs; getting iron infusions   Hx of Breast Cancer                 -port changed via IR     DVT Ppx: Heparin  Full code  MDM: High         Quality:  DVT Prophylaxis: Heparin   CODE status: FULL   DISPO: pending clinical improvement.   Estimated date of discharge: To be determined  Discharge is dependent on: Improved clinical status  At this point Patient is expected to be discharge to: Home versus rehab      Plan of care discussed with Patient and RN.     Coordinated care with providers and counseling re: treatment plan and workup     Sonya Lozano MD    Supplementary Documentation:       I personally reviewed the available laboratories, imaging including operative report. I discussed/will discuss the case with patient and her nurse. I ordered laboratories studies. I adjusted medications including not applicable today. Medical decision making high, risk is high.     >55min spent, >50% spent counseling and coordinating care in the form of educating pt/family and d/w consultants and staff. Most of the time spent discussing the above plan.

## 2025-01-31 NOTE — OCCUPATIONAL THERAPY NOTE
OCCUPATIONAL THERAPY TREATMENT NOTE - INPATIENT        Room Number: 217/217-A     Presenting Problem: cva    Problem List  Principal Problem:    Cerebellar stroke (HCC)  Active Problems:    AVM (arteriovenous malformation) of small bowel, acquired with hemorrhage    Dizziness    Right hand paresthesia    Thrombus of aorta (HCC)    Bilateral pulmonary embolism (HCC)    Acute cystitis without hematuria    Small bowel bleed not requiring more than 4 units of blood in 24 hours, ICU, or surgery    Acute blood loss anemia    Cerebrovascular accident (CVA) due to embolism of precerebral artery (HCC)    Melena    Visual hallucinations      OCCUPATIONAL THERAPY ASSESSMENT   Patient demonstrates good  progress this session, goals remain in progress.    Patient is requiring minimal assist as a result of the following impairments: decreased functional strength, decreased endurance, and visual deficits.    Patient continues to function below baseline with adls and functional mobility.  Next session anticipate patient to progress upper body dressing, lower body dressing, dynamic standing balance, and functional standing tolerance.  Occupational Therapy will continue to follow patient for duration of hospitalization.    Patient continues to benefit from continued skilled OT services: to facilitate return to prior level of function as patient demonstrates high motivation with excellent tolerance to an intensive therapy program.     PLAN DURING HOSPITALIZATION  OT Device Recommendations: TBD  OT Treatment Plan: Compensatory technique education;Patient/Family training;Patient/Family education;Endurance training;Functional transfer training;ADL training;Cognitive reorientation     SUBJECTIVE  \"What happened\"    OBJECTIVE  Precautions: Bed/chair alarm    PAIN ASSESSMENT  Ratin    ACTIVITY TOLERANCE  Pt complaining of R leg 'not being right'   /69  HR 88    O2 SATURATIONS  Activity on room air 98%    ACTIVITIES OF DAILY LIVING  ASSESSMENT  AM-PAC ‘6-Clicks’ Inpatient Daily Activity Short Form  How much help from another person does the patient currently need…  -   Putting on and taking off regular lower body clothing?: A Little  -   Bathing (including washing, rinsing, drying)?: A Lot  -   Toileting, which includes using toilet, bedpan or urinal? : A Little  -   Putting on and taking off regular upper body clothing?: A Lot  -   Taking care of personal grooming such as brushing teeth?: A Little  -   Eating meals?: A Little    AM-PAC Score:  Score: 16  Approx Degree of Impairment: 53.32%  Standardized Score (AM-PAC Scale): 35.96  CMS Modifier (G-Code): CK    FUNCTIONAL ADL ASSESSMENT  Eating: setup assist  Grooming: setup assist  UB Dressing: min assist  LB Dressing: min assist  Toileting: setup assist    Skilled Therapy Provided: RN contacted prior to start of care. Treatment coordinated w/ PT. Pt agreeable to participation in therapy. Gait belt used during dynamic activity. Pt received in bed alert and oriented to person and place.. Pt donned socks from long sit w/ supervision. Pt transferred supine<>sit at eob w/ supervision. Pt maintained unsupported sitting w/ supervision. Pt required cga to transfer sit<>stand from raised height bed;min a for toilet/chair. Pt ambulating in the room w/ cga. Pt repeatedly stating that her R leg felt unsteady. Pt required min a to manage briefs. Pt completed tolieting task w/ set up. Pt initially interested in completing grooming task, but unexpectedly reported not feeling well and needing to defer task. Pt transferred to bed side chair and appeared to be  somewhat confused, asking therapist what had just happened. Re orientation provided. RN notified and at bedside.     At end of session pt remaining up in chair w/ all needs in reach and alarm on;RN at bedside. RN aware of pt's status and performance in therapy      EDUCATION PROVIDED  Patient Education : Plan of Care; Functional Transfer Techniques;  Fall Prevention  Patient's Response to Education: Requires Further Education  Family/Caregiver's Response to Education: Verbalized Understanding    The patient's Approx Degree of Impairment: 53.32% has been calculated based on documentation in the [facility] '6 clicks' Inpatient Daily Activity Short Form.  Research supports that patients with this level of impairment may benefit from IRF.  Final disposition will be made by interdisciplinary medical team.    Patient End of Session: Up in chair;Needs met;With  staff;Call light within reach;RN aware of session/findings;All patient questions and concerns addressed;Alarm set    OT Goals:   Patient will complete functional transfer with mod I least restrictive Assistive device    Comment: Pt required min a    Patient will complete toileting with mod I  Comment: Pt required set up    Patient will tolerate standing for 5 minutes in prep for adls with mod I   Comment:Pt maintained static standing w/rw and cga ~ 1 min    Patient will complete LB dressing mod I   Comment:pt required min a          Goals  on: 2/3/25  Frequency: 5x/week    Self-Care Home Management: 10 minutes  Therapeutic Activity: 13 minutes

## 2025-01-31 NOTE — SPIRITUAL CARE NOTE
Spiritual Care Visit Note    Patient Name: Chester Bautista Date of Spiritual Care Visit: 25   : 1940 Primary Dx: Cerebellar stroke (HCC)       Referred By: Referral From:     Spiritual Care Taxonomy:    Intended Effects: Convey a calming presence    Methods: Accompany someone in their spiritual/Rastafari practice outside your elizabeth tradition;Assist with finding purpose;Assist with spiritual/Rastafari practices;Encourage self care;Encourage self reflection;Offer emotional support;Offer spiritual/Rastafari support;Offer support    Interventions: Acknowledge current situation;Acknowledge response to difficult experience;Active listening;Explain  role;Provide hospitality    Visit Type/Summary:     - Spiritual Care: Responded to a request for spiritual care and assessed the patient for spiritual care needs. Consulted with RN prior to visit. Offered empathic listening and emotional support. Provided information regarding how to contact Spiritual Care and left a Spiritual Care information card. Patient is very positive with strong elizabeth. She has family support. We prayed together.   remains available as needed for follow up.    Spiritual Care support can be requested via an Epic consult. For urgent/immediate needs, please contact the On Call  at: [place]: ext [phone]    Dave SMART  Chaplain Resident  [phone]

## 2025-01-31 NOTE — BRIEF PROCEDURE NOTE
[facility]  part of [facility]  Procedure Note    Chester Bautista Patient Status:  Inpatient    1940 MRN [id number]   Location [facility] 2W/SW Attending Sonya Lozano MD   Hosp Day # 13 PCP Heike Sorto MD     Procedure: IVC filter    Pre-Procedure Diagnosis:  h/o DVT/PE, gastric AVM's    Post-Procedure Diagnosis: as above    Anesthesia:  Local    Findings:  no caval thrombus.  Venatech IVC filter placed    Specimens: 0    Blood Loss:  minimal      Complications:  None      Bradley Boyle MD  2025

## 2025-01-31 NOTE — PROGRESS NOTES
[facility]     Gastroenterology Progress Note    Chester Bautista Patient Status:  Inpatient    1940 MRN [id number]   Location [facility] 2W/SW Attending Sonya Lozano MD   Hosp Day # 13 PCP Heike Sorto MD       Subjective:   Pt sitting up in chair  Tolerating diet without N/V or ABD pain  No BM but typically goes 2 days between stools and hasn't been eating her typical amount  No black/bloody emesis or stools, no bleeding from rectum  Port bleeding has subsided  Objective:   Blood pressure 137/69, pulse 89, temperature 98.6 °F (37 °C), temperature source Temporal, resp. rate 24, height 5' 1\" (1.549 m), weight 135 lb 9.6 oz (61.5 kg), SpO2 90%. Body mass index is 25.62 kg/m².    Gen: awake, alert patient, NAD  HEENT: EOMI, the sclera appears anicteric, oropharynx clear, mucus membranes appear moist  CV: RRR  Lung: no conversational dyspnea   Abdomen: soft NTND abdomen with NABS appreciated   Skin: dry, warm, no jaundice  Ext: no LE edema is evident  Neuro: Alert, oriented x3 and interactive  Psych: calm, cooperative    Assessment and Plan:   84 year old woman with h/o DM-II, hypertension, ?pulm HTN, rheumatoid arthritis, COPD; previous history frontal craniotomy with anterior parafalcine aneurysm clipping; multiple pulmonary emboli on previous CT scan 2024; well known to our service due to previous concern for occult GI bleeding and chronic blood loss anemia, 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     Admitted for acute infarcts, she is now on BRILINTA. Developed dark stools on 25 with drop in hgb after angiogram, stenting, procedural heparin. Received 1 unit prbc and hgb response sustained since then.      She is high risk for thrombotic events especially in light of recurrent strokes. At this point the benefits of anticoagulation >> risks of bleeding.       She was started on low-dose heparin drip but then stopped r/t drop in  hemoglobin though likely due to bleeding after oozing from new port site. Initally, robust response to 1 unit PRBCs (6.6>9), stable at 7.8 today, which is likely equilibration. No overt GIB.       Plan to continue to monitor hemoglobin. If she incurs worsening anemia and more frequent transfusion requirements, would recommend tertiary care evaluation for balloon enteroscopy in hopes of ablating dominant AVM lesions. This was also discussed with Pt who states understanding and agrees with plan     Recommendations:  Continue octreotide 50 mcg sub-q TID while admitted  Fine from GI stance for AC  Trend Hgb and monitor for overt GIB, will need tertiary care evaluation if worsenin anemia requiring more frequent transfusions as above  GI available as needed.  Please call with further questions or concerns.    Case discussed with Abram Gruber MD and Sandra SOW.    Vera Thompson DNP, FNP-BC  [facility]  1/31/2025      Results:     Lab Results   Component Value Date    WBC 13.6 (H) 01/31/2025    WBC 13.6 (H) 01/31/2025    HGB 7.8 (L) 01/31/2025    HGB 7.8 (L) 01/31/2025    HCT 24.9 (L) 01/31/2025    HCT 24.9 (L) 01/31/2025    .0 01/31/2025    .0 01/31/2025    CREATSERUM 0.99 01/31/2025    CREATSERUM 0.99 01/31/2025    BUN 21 01/31/2025    BUN 21 01/31/2025     01/31/2025     01/31/2025    K 3.7 01/31/2025    K 3.7 01/31/2025    K 3.7 01/31/2025     01/31/2025     01/31/2025    CO2 25.0 01/31/2025    CO2 25.0 01/31/2025     (H) 01/31/2025     (H) 01/31/2025    CA 9.6 01/31/2025    CA 9.6 01/31/2025    ALB 3.2 01/31/2025    ALKPHO 71 01/31/2025    BILT 0.5 01/31/2025    TP 6.2 01/31/2025    AST 19 01/31/2025    ALT 7 (L) 01/31/2025    PTT 51.3 (H) 01/30/2025    INR 1.04 01/25/2025    T4F 1.2 01/17/2025    TSH 0.418 (L) 01/17/2025    DDIMER 2.77 (H) 01/25/2025    ESRML >130 (H) 11/29/2024    CRP 3.00 (H) 11/29/2024    MG 1.5 (L) 01/31/2025    PHOS 4.0  01/22/2025    B12 814 11/01/2024       IR PORT A CATH PROCEDURE    Result Date: 1/31/2025  CONCLUSION: Attempted transfemoral snare and stripping of the fibrin sheath about the nonfunctional catheter, and this was unsuccessful.  The nonfunctioning chest port was therefore explanted in its entirety, and a new chest port placed via right internal jugular vein.     Dictated by (CST): Bradley Boyle MD on 1/31/2025 at 8:23 AM     Finalized by (CST): Bradley Boyle MD on 1/31/2025 at 8:30 AM

## 2025-02-01 LAB
ANION GAP SERPL CALC-SCNC: 8 MMOL/L (ref 0–18)
BASOPHILS # BLD AUTO: 0.07 X10(3) UL (ref 0–0.2)
BASOPHILS NFR BLD AUTO: 0.5 %
BUN BLD-MCNC: 26 MG/DL (ref 9–23)
BUN/CREAT SERPL: 24.8 (ref 10–20)
CALCIUM BLD-MCNC: 9.1 MG/DL (ref 8.7–10.4)
CHLORIDE SERPL-SCNC: 109 MMOL/L (ref 98–112)
CO2 SERPL-SCNC: 24 MMOL/L (ref 21–32)
CREAT BLD-MCNC: 1.05 MG/DL
DEPRECATED RDW RBC AUTO: 53.4 FL (ref 35.1–46.3)
EGFRCR SERPLBLD CKD-EPI 2021: 52 ML/MIN/1.73M2 (ref 60–?)
EOSINOPHIL # BLD AUTO: 0.62 X10(3) UL (ref 0–0.7)
EOSINOPHIL NFR BLD AUTO: 4.1 %
ERYTHROCYTE [DISTWIDTH] IN BLOOD BY AUTOMATED COUNT: 18.4 % (ref 11–15)
GLUCOSE BLD-MCNC: 190 MG/DL (ref 70–99)
GLUCOSE BLDC GLUCOMTR-MCNC: 117 MG/DL (ref 70–99)
GLUCOSE BLDC GLUCOMTR-MCNC: 176 MG/DL (ref 70–99)
GLUCOSE BLDC GLUCOMTR-MCNC: 203 MG/DL (ref 70–99)
GLUCOSE BLDC GLUCOMTR-MCNC: 242 MG/DL (ref 70–99)
HCT VFR BLD AUTO: 22.6 %
HCT VFR BLD AUTO: 23.8 %
HCT VFR BLD AUTO: 27.8 %
HGB BLD-MCNC: 6.9 G/DL
HGB BLD-MCNC: 7.2 G/DL
HGB BLD-MCNC: 8.7 G/DL
IMM GRANULOCYTES # BLD AUTO: 0.16 X10(3) UL (ref 0–1)
IMM GRANULOCYTES NFR BLD: 1 %
LYMPHOCYTES # BLD AUTO: 1.34 X10(3) UL (ref 1–4)
LYMPHOCYTES NFR BLD AUTO: 8.8 %
MAGNESIUM SERPL-MCNC: 2.1 MG/DL (ref 1.6–2.6)
MCH RBC QN AUTO: 24.6 PG (ref 26–34)
MCHC RBC AUTO-ENTMCNC: 30.3 G/DL (ref 31–37)
MCV RBC AUTO: 81.2 FL
MONOCYTES # BLD AUTO: 1.39 X10(3) UL (ref 0.1–1)
MONOCYTES NFR BLD AUTO: 9.1 %
NEUTROPHILS # BLD AUTO: 11.71 X10 (3) UL (ref 1.5–7.7)
NEUTROPHILS # BLD AUTO: 11.71 X10(3) UL (ref 1.5–7.7)
NEUTROPHILS NFR BLD AUTO: 76.5 %
OSMOLALITY SERPL CALC.SUM OF ELEC: 302 MOSM/KG (ref 275–295)
PLATELET # BLD AUTO: 256 10(3)UL (ref 150–450)
POTASSIUM SERPL-SCNC: 4 MMOL/L (ref 3.5–5.1)
POTASSIUM SERPL-SCNC: 4 MMOL/L (ref 3.5–5.1)
RBC # BLD AUTO: 2.93 X10(6)UL
SODIUM SERPL-SCNC: 141 MMOL/L (ref 136–145)
WBC # BLD AUTO: 15.3 X10(3) UL (ref 4–11)

## 2025-02-01 PROCEDURE — 99232 SBSQ HOSP IP/OBS MODERATE 35: CPT | Performed by: STUDENT IN AN ORGANIZED HEALTH CARE EDUCATION/TRAINING PROGRAM

## 2025-02-01 PROCEDURE — 99233 SBSQ HOSP IP/OBS HIGH 50: CPT | Performed by: FAMILY MEDICINE

## 2025-02-01 PROCEDURE — 99233 SBSQ HOSP IP/OBS HIGH 50: CPT | Performed by: INTERNAL MEDICINE

## 2025-02-01 RX ORDER — SODIUM CHLORIDE 9 MG/ML
INJECTION, SOLUTION INTRAVENOUS ONCE
Status: COMPLETED | OUTPATIENT
Start: 2025-02-01 | End: 2025-02-01

## 2025-02-01 NOTE — PLAN OF CARE
Problem: HEMATOLOGIC - ADULT  Goal: Free from bleeding injury  Description: (Example usage: patient with low platelets)  INTERVENTIONS:  - Avoid intramuscular injections, enemas and rectal medication administration  - Ensure safe mobilization of patient  - Hold pressure on venipuncture sites to achieve adequate hemostasis  - Assess for signs and symptoms of internal bleeding  - Monitor lab trends  - Patient is to report abnormal signs of bleeding to staff  - Avoid use of toothpicks and dental floss  - Use electric shaver for shaving  - Use soft bristle tooth brush  - Limit straining and forceful nose blowing  Outcome: Progressing     Problem: CARDIOVASCULAR - ADULT  Goal: Maintains optimal cardiac output and hemodynamic stability  Description: INTERVENTIONS:  - Monitor vital signs, rhythm, and trends  - Monitor for bleeding, hypotension and signs of decreased cardiac output  - Evaluate effectiveness of vasoactive medications to optimize hemodynamic stability  - Monitor arterial and/or venous puncture sites for bleeding and/or hematoma  - Assess quality of pulses, skin color and temperature  - Assess for signs of decreased coronary artery perfusion - ex. Angina  - Evaluate fluid balance, assess for edema, trend weights  Outcome: Progressing     Problem: SAFETY ADULT - FALL  Goal: Free from fall injury  Description: INTERVENTIONS:  - Assess pt frequently for physical needs  - Identify cognitive and physical deficits and behaviors that affect risk of falls.  - [place] fall precautions as indicated by assessment.  - Educate pt/family on patient safety including physical limitations  - Instruct pt to call for assistance with activity based on assessment  - Modify environment to reduce risk of injury  - Provide assistive devices as appropriate  - Consider OT/PT consult to assist with strengthening/mobility  - Encourage toileting schedule  Outcome: Progressing     Problem: SKIN/TISSUE INTEGRITY - ADULT  Goal: Skin  integrity remains intact  Description: INTERVENTIONS  - Assess and document risk factors for pressure ulcer development  - Assess and document skin integrity  - Monitor for areas of redness and/or skin breakdown  - Initiate interventions, skin care algorithm/standards of care as needed  Outcome: Progressing

## 2025-02-01 NOTE — PROGRESS NOTES
[facility]  Vascular Surgery Progress Note    Chester Bautista Patient Status:  Inpatient    1940 MRN [id number]   Location [facility] 2W/SW Attending Sonay Lozano MD   Hosp Day # 14 PCP Heike Sorto MD     Objective:   Temp: 97.4 °F (36.3 °C)  Pulse: 92  Resp: 18  BP: 133/62    Intake/Output:     Intake/Output Summary (Last 24 hours) at 2025 1636  Last data filed at 2025 1500  Gross per 24 hour   Intake 747 ml   Output 1100 ml   Net -353 ml       Events Yesterday/Overnight:  Visited the patient at the bedside.  She got IVC filter yesterday.  She has been having drops in her hemoglobin with intermittent dark bowel movements.    Exam:  Vital signs are within normal limit.  She is at her baseline from motor and sensory standpoint.  She was able to ambulate in the hallway.    Impression/Plan:  84 year old female with past medical history of COPD, DM, HTN and gastric AVM complicated with recurrent GI bleed who presented with left cerebellar stroke on Saturday, 2025.  Her hospital course was complicated with another stroke identified in the left thalamus on MRI on 2025.  She is S/P L SCA Stent. Post-operative course was notable for severe headache which was worked up with a CT head being concerning for SAH. However the subsequent CT scan ruled out SAH, but were notable for new small ischemic strokes. MRI on 2025 was notable for left parietal, occipital and temporal regions as well as bilateral frontal cortext. Patient had altered mental status on 2025, however that has improved and now she is alert and oriented with motor/sensory function close to her baseline. She was started on her Berlinta.    Plan:  - If patient requires persistent blood transfusion, it is okay to stop Brilinta and discontinue baby aspirin from vascular surgery standpoint to keep patency of the left subclavian artery stent.  - It appears that she has received the 3 units of blood  since her admission.  We can further discuss the anticoagulation/antiplatelet plan with neurology.  - Please call with questions or concerns.    Medications:    Current Facility-Administered Medications:     ticagrelor (Brilinta) tab 90 mg, 90 mg, Oral, BID    pantoprazole (Protonix) DR tab 40 mg, 40 mg, Oral, BID AC    magnesium hydroxide (Milk of Magnesia) 400 MG/5ML oral suspension 30 mL, 30 mL, Oral, Daily PRN    bisacodyl (Dulcolax) 10 MG rectal suppository 10 mg, 10 mg, Rectal, Daily PRN    fleet enema (Fleet) rectal enema 133 mL, 1 enema, Rectal, Once PRN    melatonin tab 3 mg, 3 mg, Oral, Nightly    labetalol (Trandate) 5 mg/mL injection 10 mg, 10 mg, Intravenous, Q4H PRN    carvedilol (Coreg) tab 25 mg, 25 mg, Oral, BID with meals    glucose (Dex4) 15 GM/59ML oral liquid 15 g, 15 g, Oral, Q15 Min PRN **OR** glucose (Glutose) 40% oral gel 15 g, 15 g, Oral, Q15 Min PRN **OR** glucose-vitamin C (Dex-4) chewable tab 4 tablet, 4 tablet, Oral, Q15 Min PRN **OR** dextrose 50% injection 50 mL, 50 mL, Intravenous, Q15 Min PRN **OR** glucose (Dex4) 15 GM/59ML oral liquid 30 g, 30 g, Oral, Q15 Min PRN **OR** glucose (Glutose) 40% oral gel 30 g, 30 g, Oral, Q15 Min PRN **OR** glucose-vitamin C (Dex-4) chewable tab 8 tablet, 8 tablet, Oral, Q15 Min PRN    lidocaine-menthol 4-1 % patch 1 patch, 1 patch, Transdermal, Daily    octreoTIDe (SandoSTATIN) 50 mcg/mL injection 50 mcg, 50 mcg, Subcutaneous, Q8H    HYDROmorphone (Dilaudid) 1 MG/ML injection 0.2 mg, 0.2 mg, Intravenous, Q4H PRN    rosuvastatin (Crestor) tab 10 mg, 10 mg, Oral, Nightly    sennosides (Senokot) tab 8.6 mg, 8.6 mg, Oral, BID    cetirizine (ZyrTEC) tab 5 mg, 5 mg, Oral, Daily    acetaminophen (Tylenol Extra Strength) tab 500 mg, 500 mg, Oral, Q6H PRN    docusate sodium (Colace) cap 100 mg, 100 mg, Oral, BID    meclizine (Antivert) tab 12.5 mg, 12.5 mg, Oral, TID PRN    acetaminophen (Tylenol) tab 650 mg, 650 mg, Oral, Q4H PRN **OR** acetaminophen  (Tylenol) rectal suppository 650 mg, 650 mg, Rectal, Q4H PRN    hydrALAzine (Apresoline) 20 mg/mL injection 10 mg, 10 mg, Intravenous, Q2H PRN    ondansetron (Zofran) 4 MG/2ML injection 4 mg, 4 mg, Intravenous, Q6H PRN    metoclopramide (Reglan) 5 mg/mL injection 5 mg, 5 mg, Intravenous, Q8H PRN    acetaminophen (Tylenol) tab 650 mg, 650 mg, Oral, Q6H PRN    ondansetron (Zofran) 4 MG/2ML injection 4 mg, 4 mg, Intravenous, Q6H PRN    albuterol (Ventolin) (2.5 MG/3ML) 0.083% nebulizer solution 2.5 mg, 2.5 mg, Nebulization, Q4H PRN    cyclobenzaprine (Flexeril) tab 10 mg, 10 mg, Oral, Nightly PRN    fluticasone-salmeterol (Advair Diskus) 250-50 MCG/ACT inhaler 1 puff, 1 puff, Inhalation, 2 times per day    gabapentin (Neurontin) cap 100 mg, 100 mg, Oral, Nightly    montelukast (Singulair) tab 10 mg, 10 mg, Oral, Nightly    predniSONE (Deltasone) tab 5 mg, 5 mg, Oral, Daily    losartan (Cozaar) tab 50 mg, 50 mg, Oral, Daily    hydroxychloroquine (Plaquenil) tab 300 mg, 300 mg, Oral, Daily    oxyCODONE immediate release tab 5 mg, 5 mg, Oral, Q6H PRN    insulin aspart (NovoLOG) 100 Units/mL FlexPen 1-11 Units, 1-11 Units, Subcutaneous, TID CC    insulin degludec (Tresiba) 100 units/mL flextouch 10 Units, 10 Units, Subcutaneous, Nightly    Laboratory/Data:    LABS:  Recent Labs   Lab 01/29/25  0501 01/29/25  2210 01/30/25  0348 01/30/25  1405 01/31/25  0356 02/01/25  0420 02/01/25  1338   WBC 12.5*  12.5* 15.1* 11.6*  --  13.6*  13.6* 15.3*  --    HGB 7.2*  7.2* 7.3* 6.6* 9.0* 7.8*  7.8* 7.2* 6.9*   MCV 81.7  81.7 80.3 80.7  --  78.3*  78.3* 81.2  --    .0  311.0 307.0 308.0  --  264.0  264.0 256.0  --        Recent Labs   Lab 01/28/25  0559 01/28/25  1808 01/29/25  0501 01/30/25  0348 01/31/25  0356 02/01/25  0420     --  142  142 139  139 138  138 141   K 3.6 4.9 4.0  4.0 3.7  3.7 3.7  3.7  3.7 4.0  4.0     --  110  110 107  107 106  106 109   CO2 23.0  --  23.0  23.0 24.0  24.0  25.0  25.0 24.0   BUN 8*  --  11  11 20  20 21  21 26*   CREATSERUM 0.95  --  1.03*  1.03* 1.03*  1.03* 0.99  0.99 1.05*   *  --  155*  155* 159*  159* 192*  192* 190*   CA 9.5  --  9.5  9.5 9.3  9.3 9.6  9.6 9.1   MG 1.6  --  1.6 1.8 1.5* 2.1     Recent Labs   Lab 01/29/25  0501 01/29/25  1845 01/30/25  0348   .1* 33.1 51.3*     Recent Labs   Lab 01/27/25  0514 01/28/25  0559 01/29/25  0501 01/30/25  0348 01/31/25  0356   ALT <7* <7* <7* <7* 7*   AST 20 22 21 21 19   ALB 3.2 3.3 3.2 3.2 3.2     No results for input(s): \"TROP\" in the last 168 hours.  Lab Results   Component Value Date    ANASCRN Negative 08/05/2022     Lisandra Long MD  [facility], Division of Vascular Surgery  2/1/2025  4:36 PM

## 2025-02-01 NOTE — PROGRESS NOTES
[facility]  part of [facility]    Progress Note      Assessment and Plan:   1.  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  As the risk of further thromboembolic event is high, low-dose heparin was initiated and there is no evidence of active bleed except at the recent port revision site.  Hemoglobin is now 7.2.  Neurologically good.    Recommendations:  1.  Brilinta  2.  As per neurology.  3.  Permissive hypertension    2.  GI bleed-has had multiple AVMs identified previously.  Hemoglobin now 7.2 although the patient notes that she had a dark BM.  Will recheck the hemoglobin now.    Recommendations: As per GI.  PPI.  Serial hemoglobin.  If further bleeding, patient would [facility] enteroscopy for ablation of AVMs.  Will give 1 unit of blood.  Holding the heparin at present, but on Brilinta.    3.  Bilateral PE-now has the IVC filter replaced.    Recommendations: Hold low-dose heparin for now.      4.  Left groin hematoma-conservative management    5.  Chronic respiratory failure-patient has combined pulmonary fibrosis with emphysema.    Recommendations: Advair and conservative management at present    6.  Klebsiella UTI-on cefepime    7.  Port revision-had postprocedural oozing.      Subjective:   Chester Bautista is a(n) 84 year old female who is breathing comfortably and moving better but endorses dark stool this morning.    Objective:   Blood pressure 149/68, pulse 96, temperature 97.8 °F (36.6 °C), temperature source Temporal, resp. rate 17, height 5' 1\" (1.549 m), weight 138 lb (62.6 kg), SpO2 91%.    Physical Exam alert white female  HEENT examination is unremarkable with pupils equal round and reactive to light and accommodation.   Neck without adenopathy, thyromegaly, JVD nor bruit.   Lungs clear to auscultation and percussion.  Cardiac regular rate and rhythm no murmur.   Abdomen nontender,  without hepatosplenomegaly and no mass appreciable.   Extremities without clubbing cyanosis nor edema.   Neurologic grossly intact with symmetric tone and strength and reflex except for lingering right homonymous hemianopsia partial.  Skin without gross abnormality     Results:     Lab Results   Component Value Date    WBC 15.3 02/01/2025    HGB 7.2 02/01/2025    HCT 23.8 02/01/2025    .0 02/01/2025    CREATSERUM 1.05 02/01/2025    BUN 26 02/01/2025     02/01/2025    K 4.0 02/01/2025    K 4.0 02/01/2025     02/01/2025    CO2 24.0 02/01/2025     02/01/2025    CA 9.1 02/01/2025    MG 2.1 02/01/2025       Chapin Parker MD  Medical Director, Critical Care, [facility]  Medical Director, [facility]  Pager: [phone]

## 2025-02-01 NOTE — PLAN OF CARE
S/p IVC filter - accessed the rt groin site, Brillinta PO re-started, rt chest port site not bleeding and dressing changed to small gauze and tape. PO intake tolerated - advanced to Cardiac regular consistency diet. No nausea/ vomiting.     Problem: Diabetes/Glucose Control  Goal: Glucose maintained within prescribed range  Description: INTERVENTIONS:  - Monitor Blood Glucose as ordered  - Assess for signs and symptoms of hyperglycemia and hypoglycemia  - Administer ordered medications to maintain glucose within target range  - Assess barriers to adequate nutritional intake and initiate nutrition consult as needed  - Instruct patient on self management of diabetes  Outcome: Progressing     Problem: CARDIOVASCULAR - ADULT  Goal: Maintains optimal cardiac output and hemodynamic stability  Description: INTERVENTIONS:  - Monitor vital signs, rhythm, and trends  - Monitor for bleeding, hypotension and signs of decreased cardiac output  - Evaluate effectiveness of vasoactive medications to optimize hemodynamic stability  - Monitor arterial and/or venous puncture sites for bleeding and/or hematoma  - Assess quality of pulses, skin color and temperature  - Assess for signs of decreased coronary artery perfusion - ex. Angina  - Evaluate fluid balance, assess for edema, trend weights  Outcome: Progressing  Goal: Absence of cardiac arrhythmias or at baseline  Description: INTERVENTIONS:  - Continuous cardiac monitoring, monitor vital signs, obtain 12 lead EKG if indicated  - Evaluate effectiveness of antiarrhythmic and heart rate control medications as ordered  - Initiate emergency measures for life threatening arrhythmias  - Monitor electrolytes and administer replacement therapy as ordered  Outcome: Progressing     Problem: METABOLIC/FLUID AND ELECTROLYTES - ADULT  Goal: Glucose maintained within prescribed range  Description: INTERVENTIONS:  - Monitor Blood Glucose as ordered  - Assess for signs and symptoms of  hyperglycemia and hypoglycemia  - Administer ordered medications to maintain glucose within target range  - Assess barriers to adequate nutritional intake and initiate nutrition consult as needed  - Instruct patient on self management of diabetes  Outcome: Progressing  Goal: Electrolytes maintained within normal limits  Description: INTERVENTIONS:  - Monitor labs and rhythm and assess patient for signs and symptoms of electrolyte imbalances  - Administer electrolyte replacement as ordered  - Monitor response to electrolyte replacements, including rhythm and repeat lab results as appropriate  - Fluid restriction as ordered  - Instruct patient on fluid and nutrition restrictions as appropriate  Outcome: Progressing  Goal: Hemodynamic stability and optimal renal function maintained  Description: INTERVENTIONS:  - Monitor labs and assess for signs and symptoms of volume excess or deficit  - Monitor intake, output and patient weight  - Monitor urine specific gravity, serum osmolarity and serum sodium as indicated or ordered  - Monitor response to interventions for patient's volume status, including labs, urine output, blood pressure (other measures as available)  - Encourage oral intake as appropriate  - Instruct patient on fluid and nutrition restrictions as appropriate  Outcome: Progressing     Problem: NEUROLOGICAL - ADULT  Goal: Achieves stable or improved neurological status  Description: INTERVENTIONS  - Assess for and report changes in neurological status  - Initiate measures to prevent increased intracranial pressure  - Maintain blood pressure and fluid volume within ordered parameters to optimize cerebral perfusion and minimize risk of hemorrhage  - Monitor temperature, glucose, and sodium. Initiate appropriate interventions as ordered  Outcome: Progressing  Goal: Achieves maximal functionality and self care  Description: INTERVENTIONS  - Monitor swallowing and airway patency with patient fatigue and changes in  neurological status  - Encourage and assist patient to increase activity and self care with guidance from PT/OT  - Encourage visually impaired, hearing impaired and aphasic patients to use assistive/communication devices  Outcome: Progressing     Problem: MUSCULOSKELETAL - ADULT  Goal: Return mobility to safest level of function  Description: INTERVENTIONS:  - Assess patient stability and activity tolerance for standing, transferring and ambulating w/ or w/o assistive devices  - Assist with transfers and ambulation using safe patient handling equipment as needed  - Ensure adequate protection for wounds/incisions during mobilization  - Obtain PT/OT consults as needed  - Advance activity as appropriate  - Communicate ordered activity level and limitations with patient/family  Outcome: Progressing

## 2025-02-01 NOTE — PROGRESS NOTES
Progress Note     Chester Bautista Patient Status:  Inpatient    1940 MRN [id number]   Location [facility] 2W/SW Attending Sonya Lozano MD   Hosp Day # 14 PCP Heike Sorto MD     Chief Complaint: patient presented with No chief complaint on file.      Subjective:   S: Patient is doing fine, no issues reported per RN. She is awake, alert, following commands    Review of Systems:   10 point ROS completed and was negative, except for pertinent positive and negatives stated in subjective.    Objective:   Vital signs:  Temp:  [96.8 °F (36 °C)-98.5 °F (36.9 °C)] 98.2 °F (36.8 °C)  Pulse:  [73-90] 90  Resp:  [13-25] 16  BP: (119-162)/(47-84) 137/55  SpO2:  [91 %-98 %] 91 %    Wt Readings from Last 6 Encounters:   25 138 lb (62.6 kg)   25 147 lb 14.9 oz (67.1 kg)   25 143 lb (64.9 kg)   25 139 lb (63 kg)   24 146 lb (66.2 kg)   24 144 lb 12.8 oz (65.7 kg)         Physical Exam:    General: No acute distress. Alert ,   central venous catheter in place     Respiratory: Clear to auscultation bilaterally. No wheezes. No rhonchi.  Cardiovascular: S1, S2. Regular rate and rhythm. No murmurs, rubs or gallops.   Abdomen: Soft, nontender, nondistended.  Positive bowel sounds. No rebound or guarding.  Neurologic: No focal neurological deficits.   Musculoskeletal: Moves all extremities.  Extremities: No edema.    Results:   Diagnostic Data:      Labs:    Labs Last 24 Hours:   BMP     CBC    Other     Na 141 Cl 109 BUN 26 Glu 190   Hb 7.2   PTT - Procal -   K 4.0; 4.0 CO2 24.0 Cr 1.05   WBC 15.3 >< .0  INR - CRP -   Renal Lytes Endo    Hct 23.8   Trop - D dim -   eGFR - Ca 9.1 POC Gluc  191    LFT   pBNP - Lactic -   eGFR AA - PO4 - A1c -   AST - APk - Prot -  LDL -     Mg 2.1 TSH -   ALT - T russell - Alb -        COVID-19 Lab Results    COVID-19  Lab Results   Component Value Date    COVID19 Not Detected 2025    COVID19 Not Detected 2024    COVID19  Not Detected 12/07/2023       Pro-Calcitonin  No results for input(s): \"PCT\" in the last 168 hours.    Cardiac  No results for input(s): \"TROP\", \"PBNP\" in the last 168 hours.    Creatinine Kinase  No results for input(s): \"CK\" in the last 168 hours.    Inflammatory Markers  No results for input(s): \"CRP\", \"ROSALIE\", \"LDH\", \"DDIMER\" in the last 168 hours.      Imaging: Imaging data reviewed in Epic.    Medications:    ticagrelor  90 mg Oral BID    pantoprazole  40 mg Oral BID AC    melatonin  3 mg Oral Nightly    carvedilol  25 mg Oral BID with meals    lidocaine-menthol  1 patch Transdermal Daily    octreoTIDe  50 mcg Subcutaneous Q8H    rosuvastatin  10 mg Oral Nightly    sennosides  8.6 mg Oral BID    cetirizine  5 mg Oral Daily    docusate sodium  100 mg Oral BID    fluticasone-salmeterol  1 puff Inhalation 2 times per day    gabapentin  100 mg Oral Nightly    montelukast  10 mg Oral Nightly    predniSONE  5 mg Oral Daily    losartan  50 mg Oral Daily    hydroxychloroquine  300 mg Oral Daily    insulin aspart  1-11 Units Subcutaneous TID CC    insulin degludec  10 Units Subcutaneous Nightly       Assessment & Plan:   ASSESSMENT / PLAN:     Problem List Items Addressed This Visit          HCC Problems    * (Principal) Cerebellar stroke (HCC) - Primary    Relevant Medications    labetalol (Trandate) 5 mg/mL injection 20 mg (Completed)    labetalol (Trandate) 5 mg/mL injection 10 mg (Completed)    hydrALAzine (Apresoline) 20 mg/mL injection 10 mg    gabapentin (Neurontin) cap 100 mg    losartan (Cozaar) tab 50 mg    alteplase (Activase) 2 mg in sterile water for injection (PF) 2.2 mL IV push to declot line (Completed)    clopidogrel (Plavix) tab 150 mg (Completed)    rosuvastatin (Crestor) tab 10 mg    LORazepam (Ativan) tab 0.5 mg (Completed)    heparin (Porcine) 1000 UNIT/ML injection (Completed)    midazolam (Versed) 2 MG/2ML injection (Completed)    midazolam (Versed) 2 MG/2ML injection (Completed)    protamine 10  mg/mL injection (Completed)    ticagrelor (Brilinta) 90 MG tab (Completed)    magnesium sulfate in sterile water for injection 2 g/50mL IVPB premix 2 g (Completed)    midazolam (Versed) 2 MG/2ML injection (Completed)    magnesium sulfate in sterile water for injection 2 g/50mL IVPB premix 2 g (Completed)    labetalol (Trandate) 5 mg/mL injection 10 mg    carvedilol (Coreg) tab 25 mg    magnesium sulfate in sterile water for injection 2 g/50mL IVPB premix 2 g (Completed)    midazolam (Versed) 2 MG/2ML injection (Completed)    magnesium sulfate 4 g/100mL IVPB premix 4 g (Completed)    ticagrelor (Brilinta) tab 90 mg    Thrombus of aorta (HCC)    Relevant Medications    alteplase (Activase) 2 mg in sterile water for injection (PF) 2.2 mL IV push to declot line (Completed)    clopidogrel (Plavix) tab 150 mg (Completed)    heparin (Porcine) 1000 UNIT/ML injection (Completed)    ticagrelor (Brilinta) 90 MG tab (Completed)    lidocaine-menthol 4-1 % patch 1 patch    ticagrelor (Brilinta) tab 90 mg    Bilateral pulmonary embolism (HCC)    Relevant Medications    alteplase (Activase) 2 mg in sterile water for injection (PF) 2.2 mL IV push to declot line (Completed)    clopidogrel (Plavix) tab 150 mg (Completed)    heparin in sodium chloride 0.9% (Porcine) 2 Units/mL flush bag premix (Completed)    heparin (Porcine) 1000 UNIT/ML injection (Completed)    heparin in sodium chloride 0.9% (Porcine) 2 Units/mL flush bag premix (Completed)    protamine 10 mg/mL injection (Completed)    ticagrelor (Brilinta) 90 MG tab (Completed)    heparin (Porcine) 83378 units/250 mL infusion (STROKE/NEURO) INITIAL DOSE (Completed)    heparin in sodium chloride 0.9% (Porcine) 2 Units/mL flush bag premix (Completed)    heparin (Porcine) 100 Units/mL lock flush (Completed)    heparin (Porcine) 100 Units/mL lock flush (Completed)    ticagrelor (Brilinta) tab 90 mg    heparin in sodium chloride 0.9% (Porcine) 2 Units/mL flush bag premix (Completed)        Neuro    Right hand paresthesia    Relevant Medications    gabapentin (Neurontin) cap 100 mg    LORazepam (Ativan) tab 0.5 mg (Completed)    midazolam (Versed) 2 MG/2ML injection (Completed)    midazolam (Versed) 2 MG/2ML injection (Completed)    magnesium sulfate in sterile water for injection 2 g/50mL IVPB premix 2 g (Completed)    midazolam (Versed) 2 MG/2ML injection (Completed)    magnesium sulfate in sterile water for injection 2 g/50mL IVPB premix 2 g (Completed)    magnesium sulfate in sterile water for injection 2 g/50mL IVPB premix 2 g (Completed)    midazolam (Versed) 2 MG/2ML injection (Completed)    melatonin tab 3 mg    magnesium sulfate 4 g/100mL IVPB premix 4 g (Completed)       Genitourinary and Reproductive    Acute cystitis without hematuria    Relevant Medications    labetalol (Trandate) 5 mg/mL injection 20 mg (Completed)    labetalol (Trandate) 5 mg/mL injection 10 mg (Completed)    hydrALAzine (Apresoline) 20 mg/mL injection 10 mg    losartan (Cozaar) tab 50 mg    potassium chloride 20 mEq/100mL IVPB premix 20 mEq (Completed)    potassium chloride (Klor-Con M20) tab 40 mEq (Completed)    magnesium oxide (Mag-Ox) tab 800 mg (Completed)    magnesium oxide (Mag-Ox) tab 400 mg (Completed)    potassium chloride (Klor-Con M20) tab 40 mEq (Completed)    magnesium oxide (Mag-Ox) tab 800 mg (Completed)    sodium chloride 0.9% infusion (Completed)    sodium chloride 0.9% infusion (Completed)    magnesium sulfate in sterile water for injection 2 g/50mL IVPB premix 2 g (Completed)    potassium chloride 40 mEq in 250mL sodium chloride 0.9% IVPB premix (Completed)    potassium chloride 40 mEq in 250mL sodium chloride 0.9% IVPB premix (Completed)    potassium chloride 20 mEq/100mL IVPB premix 20 mEq (Completed)    magnesium sulfate in sterile water for injection 2 g/50mL IVPB premix 2 g (Completed)    potassium chloride 20 mEq/100mL IVPB premix 20 mEq (Completed)    potassium chloride (Klor-Con) 20 MEQ  oral powder 40 mEq (Completed)    magnesium oxide (Mag-Ox) tab 800 mg (Completed)    potassium chloride (Klor-Con M20) tab 40 mEq (Completed)    magnesium oxide (Mag-Ox) tab 400 mg (Completed)    labetalol (Trandate) 5 mg/mL injection 10 mg    carvedilol (Coreg) tab 25 mg    magnesium sulfate in sterile water for injection 2 g/50mL IVPB premix 2 g (Completed)    sodium chloride 0.9% infusion (Completed)    potassium chloride 20 mEq/100mL IVPB premix 20 mEq (Completed)    magnesium oxide (Mag-Ox) tab 400 mg (Completed)    ceFEPIme (Maxipime) 1 g in sodium chloride 0.9% 100 mL IVPB-MBP (Completed)    magnesium sulfate 4 g/100mL IVPB premix 4 g (Completed)    potassium chloride (Klor-Con M20) tab 40 mEq (Completed)       Symptoms and Signs    Dizziness         #Anemia  -in the setting of PE and aortic arch thrombus, the decision was made to resume anticoagulation despite the patients history of bleeding AVMs  -monitor H&H, transfuse as indicated  -GI on consult                Start octreotide 50 mcg TID  Continue Brilinta as the benefit of AC are >> risks of bleeding  Possible endoscopic eval vs transfer to a tertiary care center for balloon enteroscopy and colonoscopy  Currently on a heparin drip  Monitor H&H  -transfused 1U pRBC (total 2U pRBC and 1U platelets since admission)  1/31: HB stable today, continue to monitor   2/1: Hb about the same, recheck HB at 1 pm      #Left Cerebellar Infarcts  #SAH  - pt with new sudden onset neuro changes  - 2 small cerebellar infarcts seen on admission MRI  -Patient already took 3 baby aspirin's prior to coming to ED   - CTA negative for LVO, but showing small subsegmental Pes  - Stroke alert called 1/19 -> stroke CT non-acute, MRI showing small CVA in L thalamus   - f/u lipid panel, A1c, TSH, ECHO   - Neuro on consult  -Continue statin  -Brillinta resumed 01/26/2025  -high risk morbidity/mortality given her history of GI bleed and now need for antiplatelet therapy and potential  anticoagulation in the future. Family is aware  -gradually lower BP  -Vascular surgery on consult  -s/p stent placement 10/24/2025  -Brilinta x6 months  -CT head reviewed, repeat CT pending  -Appreciate further neuro and vascular surgery input  -continue present management  1/31: d/w Dr Kang (neuro)recommends DC heparin, dc Asp, Resume Brilinta   2/1: no new issues, continue brilinta      #Dizziness  Improved   -Likely in setting of above  -Meclizine prn   -Neurology consulted                -continue brilinta and statin     #PE   -Patient is hemodynamically stable, EKG NSR and troponin not elevated.  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 Already took 3 baby aspirins prior to admission  -Previously had IVC filter placed in March 2024 for PE at that time (removed June 2024).   -Needs eval for hypercoagulable state. Risk factors of recent car ride, overall sedentary state, breast cancer. Sees Dr. Cade Chu in clinic for anemia, needs further follow up   -given current presentation the patient was restarted on AC, close Hb monitoring  -heparin drip at this time, change to PO at the time of discharge  1/31: heparin is dc. Pulmonary following   2/1 : s/p IVC filter on 1/31      #Descending aortic arch thrombus   -Admission CTA chest revealing mural thrombus throughout the thoracic and abdominal aorta, chronic per radiology  -Vascular surgery on consult  -planning for angiogram 01/24/2025  -cont brilinta and statin for now      #HTN  -Hold home antihypertensives to allow for permissive hypertension poststroke     #Klebsiella UTI  -nitrofurantoin initiated in ED, UCx reviewed - nitrofurantoin resistant  -started on IV Cefepime - complete course of treatment  -deescalate abx as indicated     #Constipation  -bowel regimen ordered      Chronic Medical Problems  HTN  HLD  DM  COPD/?sarcoidosis   Rheumatoid Arthritis  DVT/PE s/p IVC which has been removed    CKD3  Anemia - in setting of bleeding AVMs; getting iron infusions   Hx of Breast Cancer                 -port changed via IR     DVT Ppx: Heparin  Full code  MDM: High         Quality:  DVT Prophylaxis: Heparin   CODE status: FULL   DISPO: pending clinical improvement.   Estimated date of discharge: To be determined  Discharge is dependent on: Improved clinical status  At this point Patient is expected to be discharge to: Home versus rehab      Plan of care discussed with Patient and RN.     Coordinated care with providers and counseling re: treatment plan and workup     Sonya Lozano MD    Supplementary Documentation:       I personally reviewed the available laboratories, imaging including operative report. I discussed/will discuss the case with patient and her nurse. I ordered laboratories studies. I adjusted medications including not applicable today. Medical decision making high, risk is high.     >55min spent, >50% spent counseling and coordinating care in the form of educating pt/family and d/w consultants and staff. Most of the time spent discussing the above plan.

## 2025-02-02 ENCOUNTER — APPOINTMENT (OUTPATIENT)
Dept: GENERAL RADIOLOGY | Facility: HOSPITAL | Age: 85
End: 2025-02-02
Attending: INTERNAL MEDICINE
Payer: MEDICARE

## 2025-02-02 LAB
ANION GAP SERPL CALC-SCNC: 11 MMOL/L (ref 0–18)
BASOPHILS # BLD AUTO: 0.08 X10(3) UL (ref 0–0.2)
BASOPHILS NFR BLD AUTO: 0.6 %
BLOOD TYPE BARCODE: 7300
BUN BLD-MCNC: 20 MG/DL (ref 9–23)
BUN/CREAT SERPL: 21.1 (ref 10–20)
CALCIUM BLD-MCNC: 8.9 MG/DL (ref 8.7–10.4)
CHLORIDE SERPL-SCNC: 103 MMOL/L (ref 98–112)
CO2 SERPL-SCNC: 24 MMOL/L (ref 21–32)
CREAT BLD-MCNC: 0.95 MG/DL
DEPRECATED RDW RBC AUTO: 56 FL (ref 35.1–46.3)
EGFRCR SERPLBLD CKD-EPI 2021: 59 ML/MIN/1.73M2 (ref 60–?)
EOSINOPHIL # BLD AUTO: 0.45 X10(3) UL (ref 0–0.7)
EOSINOPHIL NFR BLD AUTO: 3.1 %
ERYTHROCYTE [DISTWIDTH] IN BLOOD BY AUTOMATED COUNT: 18.7 % (ref 11–15)
GLUCOSE BLD-MCNC: 168 MG/DL (ref 70–99)
GLUCOSE BLDC GLUCOMTR-MCNC: 126 MG/DL (ref 70–99)
GLUCOSE BLDC GLUCOMTR-MCNC: 176 MG/DL (ref 70–99)
GLUCOSE BLDC GLUCOMTR-MCNC: 223 MG/DL (ref 70–99)
GLUCOSE BLDC GLUCOMTR-MCNC: 229 MG/DL (ref 70–99)
GLUCOSE BLDC GLUCOMTR-MCNC: 53 MG/DL (ref 70–99)
GLUCOSE BLDC GLUCOMTR-MCNC: 94 MG/DL (ref 70–99)
HCT VFR BLD AUTO: 25.7 %
HCT VFR BLD AUTO: 26.8 %
HCT VFR BLD AUTO: 26.9 %
HGB BLD-MCNC: 8.3 G/DL
HGB BLD-MCNC: 8.4 G/DL
HGB BLD-MCNC: 8.7 G/DL
IMM GRANULOCYTES # BLD AUTO: 0.19 X10(3) UL (ref 0–1)
IMM GRANULOCYTES NFR BLD: 1.3 %
LYMPHOCYTES # BLD AUTO: 1.43 X10(3) UL (ref 1–4)
LYMPHOCYTES NFR BLD AUTO: 9.9 %
MCH RBC QN AUTO: 26.9 PG (ref 26–34)
MCHC RBC AUTO-ENTMCNC: 32.3 G/DL (ref 31–37)
MCV RBC AUTO: 83.3 FL
MONOCYTES # BLD AUTO: 1.7 X10(3) UL (ref 0.1–1)
MONOCYTES NFR BLD AUTO: 11.8 %
NEUTROPHILS # BLD AUTO: 10.53 X10 (3) UL (ref 1.5–7.7)
NEUTROPHILS # BLD AUTO: 10.53 X10(3) UL (ref 1.5–7.7)
NEUTROPHILS NFR BLD AUTO: 73.3 %
OSMOLALITY SERPL CALC.SUM OF ELEC: 292 MOSM/KG (ref 275–295)
PLATELET # BLD AUTO: 229 10(3)UL (ref 150–450)
POTASSIUM SERPL-SCNC: 3.2 MMOL/L (ref 3.5–5.1)
RBC # BLD AUTO: 3.23 X10(6)UL
SODIUM SERPL-SCNC: 138 MMOL/L (ref 136–145)
UNIT VOLUME: 350 ML
WBC # BLD AUTO: 14.4 X10(3) UL (ref 4–11)

## 2025-02-02 PROCEDURE — 99233 SBSQ HOSP IP/OBS HIGH 50: CPT | Performed by: INTERNAL MEDICINE

## 2025-02-02 PROCEDURE — 71045 X-RAY EXAM CHEST 1 VIEW: CPT | Performed by: INTERNAL MEDICINE

## 2025-02-02 PROCEDURE — 99233 SBSQ HOSP IP/OBS HIGH 50: CPT | Performed by: FAMILY MEDICINE

## 2025-02-02 PROCEDURE — 99232 SBSQ HOSP IP/OBS MODERATE 35: CPT | Performed by: INTERNAL MEDICINE

## 2025-02-02 NOTE — PLAN OF CARE
Patient had 1 episode of dark stool in the morning, repeat Hgb was 6.9, s/p 1 u of PRBC.     Problem: Diabetes/Glucose Control  Goal: Glucose maintained within prescribed range  Description: INTERVENTIONS:  - Monitor Blood Glucose as ordered  - Assess for signs and symptoms of hyperglycemia and hypoglycemia  - Administer ordered medications to maintain glucose within target range  - Assess barriers to adequate nutritional intake and initiate nutrition consult as needed  - Instruct patient on self management of diabetes  2/1/2025 1829 by Sandra Solares RN  Outcome: Progressing  2/1/2025 1829 by Sandra Solares RN  Outcome: Progressing     Problem: CARDIOVASCULAR - ADULT  Goal: Maintains optimal cardiac output and hemodynamic stability  Description: INTERVENTIONS:  - Monitor vital signs, rhythm, and trends  - Monitor for bleeding, hypotension and signs of decreased cardiac output  - Evaluate effectiveness of vasoactive medications to optimize hemodynamic stability  - Monitor arterial and/or venous puncture sites for bleeding and/or hematoma  - Assess quality of pulses, skin color and temperature  - Assess for signs of decreased coronary artery perfusion - ex. Angina  - Evaluate fluid balance, assess for edema, trend weights  2/1/2025 1829 by Sandra Solares RN  Outcome: Progressing  2/1/2025 1829 by Sandra Solares RN  Outcome: Progressing  Goal: Absence of cardiac arrhythmias or at baseline  Description: INTERVENTIONS:  - Continuous cardiac monitoring, monitor vital signs, obtain 12 lead EKG if indicated  - Evaluate effectiveness of antiarrhythmic and heart rate control medications as ordered  - Initiate emergency measures for life threatening arrhythmias  - Monitor electrolytes and administer replacement therapy as ordered  2/1/2025 1829 by Sandra Solares RN  Outcome: Progressing  2/1/2025 1829 by Sandra Solares RN  Outcome: Progressing     Problem: METABOLIC/FLUID AND  ELECTROLYTES - ADULT  Goal: Glucose maintained within prescribed range  Description: INTERVENTIONS:  - Monitor Blood Glucose as ordered  - Assess for signs and symptoms of hyperglycemia and hypoglycemia  - Administer ordered medications to maintain glucose within target range  - Assess barriers to adequate nutritional intake and initiate nutrition consult as needed  - Instruct patient on self management of diabetes  2/1/2025 1829 by Sandra Solares RN  Outcome: Progressing  2/1/2025 1829 by Sandra Solares RN  Outcome: Progressing  Goal: Electrolytes maintained within normal limits  Description: INTERVENTIONS:  - Monitor labs and rhythm and assess patient for signs and symptoms of electrolyte imbalances  - Administer electrolyte replacement as ordered  - Monitor response to electrolyte replacements, including rhythm and repeat lab results as appropriate  - Fluid restriction as ordered  - Instruct patient on fluid and nutrition restrictions as appropriate  2/1/2025 1829 by Sandra Solares RN  Outcome: Progressing  2/1/2025 1829 by Sandra Solares RN  Outcome: Progressing     Problem: NEUROLOGICAL - ADULT  Goal: Achieves stable or improved neurological status  Description: INTERVENTIONS  - Assess for and report changes in neurological status  - Initiate measures to prevent increased intracranial pressure  - Maintain blood pressure and fluid volume within ordered parameters to optimize cerebral perfusion and minimize risk of hemorrhage  - Monitor temperature, glucose, and sodium. Initiate appropriate interventions as ordered  Outcome: Progressing  Goal: Achieves maximal functionality and self care  Description: INTERVENTIONS  - Monitor swallowing and airway patency with patient fatigue and changes in neurological status  - Encourage and assist patient to increase activity and self care with guidance from PT/OT  - Encourage visually impaired, hearing impaired and aphasic patients to use  assistive/communication devices  Outcome: Progressing     Problem: MUSCULOSKELETAL - ADULT  Goal: Return mobility to safest level of function  Description: INTERVENTIONS:  - Assess patient stability and activity tolerance for standing, transferring and ambulating w/ or w/o assistive devices  - Assist with transfers and ambulation using safe patient handling equipment as needed  - Ensure adequate protection for wounds/incisions during mobilization  - Obtain PT/OT consults as needed  - Advance activity as appropriate  - Communicate ordered activity level and limitations with patient/family  Outcome: Progressing

## 2025-02-02 NOTE — PROGRESS NOTES
Progress Note     Chester Bautista Patient Status:  Inpatient    1940 MRN [id number]   Location [facility] 2W/SW Attending Sonya Lozano MD   Hosp Day # 15 PCP Heike Sorto MD     Chief Complaint: patient presented with No chief complaint on file.      Subjective:   S: Patient is doing fine, no issues reported per RN. She is awake, alert, following commands  : pt had one dark color BM this am     Review of Systems:   10 point ROS completed and was negative, except for pertinent positive and negatives stated in subjective.    Objective:   Vital signs:  Temp:  [96.8 °F (36 °C)-98.9 °F (37.2 °C)] 98.9 °F (37.2 °C)  Pulse:  [] 103  Resp:  [14-26] 25  BP: (133-187)/() 187/70  SpO2:  [86 %-100 %] 98 %    Wt Readings from Last 6 Encounters:   25 137 lb 6.4 oz (62.3 kg)   25 147 lb 14.9 oz (67.1 kg)   25 143 lb (64.9 kg)   25 139 lb (63 kg)   24 146 lb (66.2 kg)   24 144 lb 12.8 oz (65.7 kg)         Physical Exam:    General: No acute distress. Alert ,   central venous catheter in place     Respiratory: Clear to auscultation bilaterally. No wheezes. No rhonchi.  Cardiovascular: S1, S2. Regular rate and rhythm. No murmurs, rubs or gallops.   Abdomen: Soft, nontender, nondistended.  Positive bowel sounds. No rebound or guarding.  Neurologic: No focal neurological deficits.   Musculoskeletal: Moves all extremities.  Extremities: No edema.    Results:   Diagnostic Data:      Labs:    Labs Last 24 Hours:   BMP     CBC    Other     Na 138 Cl 103 BUN 20 Glu 168   Hb 8.7   PTT - Procal -   K 3.2 CO2 24.0 Cr 0.95   WBC 14.4 >< .0  INR - CRP -   Renal Lytes Endo    Hct 26.9   Trop - D dim -   eGFR - Ca 8.9 POC Gluc  117    LFT   pBNP - Lactic -   eGFR AA - PO4 - A1c -   AST - APk - Prot -  LDL -     Mg - TSH -   ALT - T russell - Alb -        COVID-19 Lab Results    COVID-19  Lab Results   Component Value Date    COVID19 Not Detected 2025     COVID19 Not Detected 02/02/2024    COVID19 Not Detected 12/07/2023       Pro-Calcitonin  No results for input(s): \"PCT\" in the last 168 hours.    Cardiac  No results for input(s): \"TROP\", \"PBNP\" in the last 168 hours.    Creatinine Kinase  No results for input(s): \"CK\" in the last 168 hours.    Inflammatory Markers  No results for input(s): \"CRP\", \"ROSALIE\", \"LDH\", \"DDIMER\" in the last 168 hours.      Imaging: Imaging data reviewed in Epic.    Medications:    potassium chloride  40 mEq Intravenous Once    [Held by provider] ticagrelor  90 mg Oral BID    pantoprazole  40 mg Oral BID AC    melatonin  3 mg Oral Nightly    carvedilol  25 mg Oral BID with meals    lidocaine-menthol  1 patch Transdermal Daily    octreoTIDe  50 mcg Subcutaneous Q8H    rosuvastatin  10 mg Oral Nightly    sennosides  8.6 mg Oral BID    cetirizine  5 mg Oral Daily    docusate sodium  100 mg Oral BID    fluticasone-salmeterol  1 puff Inhalation 2 times per day    gabapentin  100 mg Oral Nightly    montelukast  10 mg Oral Nightly    predniSONE  5 mg Oral Daily    losartan  50 mg Oral Daily    hydroxychloroquine  300 mg Oral Daily    insulin aspart  1-11 Units Subcutaneous TID CC    insulin degludec  10 Units Subcutaneous Nightly       Assessment & Plan:   ASSESSMENT / PLAN:     Problem List Items Addressed This Visit          HCC Problems    * (Principal) Cerebellar stroke (HCC) - Primary    Relevant Medications    labetalol (Trandate) 5 mg/mL injection 20 mg (Completed)    labetalol (Trandate) 5 mg/mL injection 10 mg (Completed)    hydrALAzine (Apresoline) 20 mg/mL injection 10 mg    gabapentin (Neurontin) cap 100 mg    losartan (Cozaar) tab 50 mg    alteplase (Activase) 2 mg in sterile water for injection (PF) 2.2 mL IV push to declot line (Completed)    clopidogrel (Plavix) tab 150 mg (Completed)    rosuvastatin (Crestor) tab 10 mg    LORazepam (Ativan) tab 0.5 mg (Completed)    heparin (Porcine) 1000 UNIT/ML injection (Completed)    midazolam  (Versed) 2 MG/2ML injection (Completed)    midazolam (Versed) 2 MG/2ML injection (Completed)    protamine 10 mg/mL injection (Completed)    ticagrelor (Brilinta) 90 MG tab (Completed)    magnesium sulfate in sterile water for injection 2 g/50mL IVPB premix 2 g (Completed)    midazolam (Versed) 2 MG/2ML injection (Completed)    magnesium sulfate in sterile water for injection 2 g/50mL IVPB premix 2 g (Completed)    labetalol (Trandate) 5 mg/mL injection 10 mg    carvedilol (Coreg) tab 25 mg    magnesium sulfate in sterile water for injection 2 g/50mL IVPB premix 2 g (Completed)    midazolam (Versed) 2 MG/2ML injection (Completed)    magnesium sulfate 4 g/100mL IVPB premix 4 g (Completed)    ticagrelor (Brilinta) tab 90 mg    Thrombus of aorta (HCC)    Relevant Medications    alteplase (Activase) 2 mg in sterile water for injection (PF) 2.2 mL IV push to declot line (Completed)    clopidogrel (Plavix) tab 150 mg (Completed)    heparin (Porcine) 1000 UNIT/ML injection (Completed)    ticagrelor (Brilinta) 90 MG tab (Completed)    lidocaine-menthol 4-1 % patch 1 patch    ticagrelor (Brilinta) tab 90 mg    Bilateral pulmonary embolism (HCC)    Relevant Medications    alteplase (Activase) 2 mg in sterile water for injection (PF) 2.2 mL IV push to declot line (Completed)    clopidogrel (Plavix) tab 150 mg (Completed)    heparin in sodium chloride 0.9% (Porcine) 2 Units/mL flush bag premix (Completed)    heparin (Porcine) 1000 UNIT/ML injection (Completed)    heparin in sodium chloride 0.9% (Porcine) 2 Units/mL flush bag premix (Completed)    protamine 10 mg/mL injection (Completed)    ticagrelor (Brilinta) 90 MG tab (Completed)    heparin (Porcine) 47353 units/250 mL infusion (STROKE/NEURO) INITIAL DOSE (Completed)    heparin in sodium chloride 0.9% (Porcine) 2 Units/mL flush bag premix (Completed)    heparin (Porcine) 100 Units/mL lock flush (Completed)    heparin (Porcine) 100 Units/mL lock flush (Completed)    ticagrelor  (Brilinta) tab 90 mg    heparin in sodium chloride 0.9% (Porcine) 2 Units/mL flush bag premix (Completed)       Neuro    Right hand paresthesia    Relevant Medications    gabapentin (Neurontin) cap 100 mg    LORazepam (Ativan) tab 0.5 mg (Completed)    midazolam (Versed) 2 MG/2ML injection (Completed)    midazolam (Versed) 2 MG/2ML injection (Completed)    magnesium sulfate in sterile water for injection 2 g/50mL IVPB premix 2 g (Completed)    midazolam (Versed) 2 MG/2ML injection (Completed)    magnesium sulfate in sterile water for injection 2 g/50mL IVPB premix 2 g (Completed)    magnesium sulfate in sterile water for injection 2 g/50mL IVPB premix 2 g (Completed)    midazolam (Versed) 2 MG/2ML injection (Completed)    melatonin tab 3 mg    magnesium sulfate 4 g/100mL IVPB premix 4 g (Completed)       Genitourinary and Reproductive    Acute cystitis without hematuria    Relevant Medications    labetalol (Trandate) 5 mg/mL injection 20 mg (Completed)    labetalol (Trandate) 5 mg/mL injection 10 mg (Completed)    hydrALAzine (Apresoline) 20 mg/mL injection 10 mg    losartan (Cozaar) tab 50 mg    potassium chloride 20 mEq/100mL IVPB premix 20 mEq (Completed)    potassium chloride (Klor-Con M20) tab 40 mEq (Completed)    magnesium oxide (Mag-Ox) tab 800 mg (Completed)    magnesium oxide (Mag-Ox) tab 400 mg (Completed)    potassium chloride (Klor-Con M20) tab 40 mEq (Completed)    magnesium oxide (Mag-Ox) tab 800 mg (Completed)    sodium chloride 0.9% infusion (Completed)    sodium chloride 0.9% infusion (Completed)    magnesium sulfate in sterile water for injection 2 g/50mL IVPB premix 2 g (Completed)    potassium chloride 40 mEq in 250mL sodium chloride 0.9% IVPB premix (Completed)    potassium chloride 40 mEq in 250mL sodium chloride 0.9% IVPB premix (Completed)    potassium chloride 20 mEq/100mL IVPB premix 20 mEq (Completed)    magnesium sulfate in sterile water for injection 2 g/50mL IVPB premix 2 g (Completed)     potassium chloride 20 mEq/100mL IVPB premix 20 mEq (Completed)    potassium chloride (Klor-Con) 20 MEQ oral powder 40 mEq (Completed)    magnesium oxide (Mag-Ox) tab 800 mg (Completed)    potassium chloride (Klor-Con M20) tab 40 mEq (Completed)    magnesium oxide (Mag-Ox) tab 400 mg (Completed)    labetalol (Trandate) 5 mg/mL injection 10 mg    carvedilol (Coreg) tab 25 mg    magnesium sulfate in sterile water for injection 2 g/50mL IVPB premix 2 g (Completed)    sodium chloride 0.9% infusion (Completed)    potassium chloride 20 mEq/100mL IVPB premix 20 mEq (Completed)    magnesium oxide (Mag-Ox) tab 400 mg (Completed)    ceFEPIme (Maxipime) 1 g in sodium chloride 0.9% 100 mL IVPB-MBP (Completed)    magnesium sulfate 4 g/100mL IVPB premix 4 g (Completed)    potassium chloride (Klor-Con M20) tab 40 mEq (Completed)    sodium chloride 0.9% infusion (Completed)    potassium chloride 40 mEq in 250mL sodium chloride 0.9% IVPB premix       Symptoms and Signs    Dizziness         #Anemia  -in the setting of PE and aortic arch thrombus, the decision was made to resume anticoagulation despite the patients history of bleeding AVMs  -monitor H&H, transfuse as indicated  -GI on consult                Start octreotide 50 mcg TID  Continue Brilinta as the benefit of AC are >> risks of bleeding  Possible endoscopic eval vs transfer to a tertiary care center for balloon enteroscopy and colonoscopy  Currently on a heparin drip  Monitor H&H  -transfused 1U pRBC (total 2U pRBC and 1U platelets since admission)  1/31: HB stable today, continue to monitor   2/1: Hb about the same, recheck HB at 1 pm   2/1: Pt hb was down to 6.8, s/p ! Unit transfusion, this am Hb 8.7, had one dark colored BM, d/w Gastro yesterday, Dr Gruber will see her today . Reveiwed Vascular notes, Brilinta is held,     #Left Cerebellar Infarcts  #SAH  - pt with new sudden onset neuro changes  - 2 small cerebellar infarcts seen on admission MRI  -Patient already  took 3 baby aspirin's prior to coming to ED   - CTA negative for LVO, but showing small subsegmental Pes  - Stroke alert called 1/19 -> stroke CT non-acute, MRI showing small CVA in L thalamus   - f/u lipid panel, A1c, TSH, ECHO   - Neuro on consult  -Continue statin  -Brillinta resumed 01/26/2025  -high risk morbidity/mortality given her history of GI bleed and now need for antiplatelet therapy and potential anticoagulation in the future. Family is aware  -gradually lower BP  -Vascular surgery on consult  -s/p stent placement 10/24/2025  -Brilinta x6 months  -CT head reviewed, repeat CT pending  -Appreciate further neuro and vascular surgery input  -continue present management  1/31: d/w Dr Kang (neuro)recommends DC heparin, dc Asp, Resume Brilinta   2/1: no new issues, continue brilinta      #Dizziness  Improved   -Likely in setting of above  -Meclizine prn   -Neurology consulted                -continue brilinta and statin     #PE   -Patient is hemodynamically stable, EKG NSR and troponin not elevated.  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 Already took 3 baby aspirins prior to admission  -Previously had IVC filter placed in March 2024 for PE at that time (removed June 2024).   -Needs eval for hypercoagulable state. Risk factors of recent car ride, overall sedentary state, breast cancer. Sees Dr. Mohamud Hem in clinic for anemia, needs further follow up   -given current presentation the patient was restarted on AC, close Hb monitoring  -heparin drip at this time, change to PO at the time of discharge  1/31: heparin is dc. Pulmonary following   2/1 : s/p IVC filter on 1/31      #Descending aortic arch thrombus   -Admission CTA chest revealing mural thrombus throughout the thoracic and abdominal aorta, chronic per radiology  -Vascular surgery on consult  -planning for angiogram 01/24/2025  -cont brilinta and statin for now      #HTN  -Hold home  antihypertensives to allow for permissive hypertension poststroke     #Klebsiella UTI  -nitrofurantoin initiated in ED, UCx reviewed - nitrofurantoin resistant  -started on IV Cefepime - complete course of treatment  -deescalate abx as indicated     #Constipation  -bowel regimen ordered      Chronic Medical Problems  HTN  HLD  DM  COPD/?sarcoidosis   Rheumatoid Arthritis  DVT/PE s/p IVC which has been removed   CKD3  Anemia - in setting of bleeding AVMs; getting iron infusions   Hx of Breast Cancer                 -port changed via IR     DVT Ppx: Heparin  Full code  MDM: High         Quality:  DVT Prophylaxis: Heparin   CODE status: FULL   DISPO: pending clinical improvement.   Estimated date of discharge: To be determined  Discharge is dependent on: Improved clinical status  At this point Patient is expected to be discharge to: Home versus rehab      Plan of care discussed with Patient and RN.     Coordinated care with providers and counseling re: treatment plan and workup     Sonya Lozano MD    Supplementary Documentation:       I personally reviewed the available laboratories, imaging including operative report. I discussed/will discuss the case with patient and her nurse. I ordered laboratories studies. I adjusted medications including not applicable today. Medical decision making high, risk is high.     >55min spent, >50% spent counseling and coordinating care in the form of educating pt/family and d/w consultants and staff. Most of the time spent discussing the above plan.

## 2025-02-02 NOTE — PLAN OF CARE
Problem: HEMATOLOGIC - ADULT  Goal: Free from bleeding injury  Description: (Example usage: patient with low platelets)  INTERVENTIONS:  - Avoid intramuscular injections, enemas and rectal medication administration  - Ensure safe mobilization of patient  - Hold pressure on venipuncture sites to achieve adequate hemostasis  - Assess for signs and symptoms of internal bleeding  - Monitor lab trends  - Patient is to report abnormal signs of bleeding to staff  - Avoid use of toothpicks and dental floss  - Use electric shaver for shaving  - Use soft bristle tooth brush  - Limit straining and forceful nose blowing  Outcome: Progressing     Problem: Diabetes/Glucose Control  Goal: Glucose maintained within prescribed range  Description: INTERVENTIONS:  - Monitor Blood Glucose as ordered  - Assess for signs and symptoms of hyperglycemia and hypoglycemia  - Administer ordered medications to maintain glucose within target range  - Assess barriers to adequate nutritional intake and initiate nutrition consult as needed  - Instruct patient on self management of diabetes  Outcome: Progressing     Problem: CARDIOVASCULAR - ADULT  Goal: Maintains optimal cardiac output and hemodynamic stability  Description: INTERVENTIONS:  - Monitor vital signs, rhythm, and trends  - Monitor for bleeding, hypotension and signs of decreased cardiac output  - Evaluate effectiveness of vasoactive medications to optimize hemodynamic stability  - Monitor arterial and/or venous puncture sites for bleeding and/or hematoma  - Assess quality of pulses, skin color and temperature  - Assess for signs of decreased coronary artery perfusion - ex. Angina  - Evaluate fluid balance, assess for edema, trend weights  Outcome: Progressing     Problem: NEUROLOGICAL - ADULT  Goal: Achieves stable or improved neurological status  Description: INTERVENTIONS  - Assess for and report changes in neurological status  - Initiate measures to prevent increased intracranial  pressure  - Maintain blood pressure and fluid volume within ordered parameters to optimize cerebral perfusion and minimize risk of hemorrhage  - Monitor temperature, glucose, and sodium. Initiate appropriate interventions as ordered  Outcome: Progressing     Problem: SAFETY ADULT - FALL  Goal: Free from fall injury  Description: INTERVENTIONS:  - Assess pt frequently for physical needs  - Identify cognitive and physical deficits and behaviors that affect risk of falls.  - [place] fall precautions as indicated by assessment.  - Educate pt/family on patient safety including physical limitations  - Instruct pt to call for assistance with activity based on assessment  - Modify environment to reduce risk of injury  - Provide assistive devices as appropriate  - Consider OT/PT consult to assist with strengthening/mobility  - Encourage toileting schedule  Outcome: Progressing     Problem: SKIN/TISSUE INTEGRITY - ADULT  Goal: Skin integrity remains intact  Description: INTERVENTIONS  - Assess and document risk factors for pressure ulcer development  - Assess and document skin integrity  - Monitor for areas of redness and/or skin breakdown  - Initiate interventions, skin care algorithm/standards of care as needed  Outcome: Progressing

## 2025-02-02 NOTE — PROGRESS NOTES
[facility]     Gastroenterology Progress Note    Chester Bautista Patient Status:  Inpatient    1940 MRN [id number]   Location [facility] 2W/SW Attending Sonya Lozano MD   Hosp Day # 15 PCP Heike Sorto MD       Subjective:   Chief complaint: GI bleeding, recurrent anemia    -sitting in chair  -eating food  -denies overt GI bleeding  -no vomiting  -no cp/sob      Objective:   Blood pressure (!) 187/70, pulse 103, temperature 98.9 °F (37.2 °C), temperature source Temporal, resp. rate 25, height 5' 1\" (1.549 m), weight 137 lb 6.4 oz (62.3 kg), SpO2 98%. Body mass index is 25.96 kg/m².    Gen: awake, alert patient, NAD  HEENT: EOMI, the sclera appears anicteric  CV: RRR  Lung: no conversational dyspnea   Abdomen: soft NTND abdomen with NABS appreciated   Skin: dry, warm, no jaundice  Ext: no LE edema is evident  Neuro: Alert, oriented x3 and interactive  Psych: calm, cooperative    Assessment and Plan:   84 year old woman with h/o DM-II, hypertension, ?pulm HTN, rheumatoid arthritis, COPD; previous history frontal craniotomy with anterior parafalcine aneurysm clipping; multiple pulmonary emboli on previous CT scan 2024; well known to our service due to previous concern for occult GI bleeding and chronic blood loss anemia, 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    #Recurrent GI AVMs  #Hx of CVA  RA  #COPD  #Hx of PE     discussion- I had a discussion with patient and her daughter. Unfortunately patient has recurrent GI blood loss from AVMs and recurrent anemia. She has had multiple procedures over the last 2 years (7 procedures, Push enteroscopy, EGD, CLN, VCE). She has had cauterization of AVMs on some of these procedures.    The issue is with cauterization, is that she forms NEW AVMs quickly in new locations despite the cauterizations. Treatment with octreotide can be helpful to \"stabilize\" these AVMs but not perfect in all cases.      We discussed options of:  Repeat enteroscopy/colonoscopy here  Transfer to [facility] center for deep enteroscopy    However, to do this,we would need to hold blood thinner for cautery which would increase her risk of CVA. Additionally the complication risk/sedation risk is something she would need to be willing to take on. She understands that if she underwent these invasive tests, she will still need to be admitted with recurrent anemia few weeks/months from now given recurrent AVM formation.    She expressed to her daughter about how she is tired of under going these tests and that at her age, she wants to not be I hospital constantly. Then the only option is to understand this patient is transfusion dependent. This would require weekly or biweekly outpatient blood tranfusions to maintain her blood counts. At this point, the patient would like to pursue this option as there is no DURABLE endoscopic treatment to prevent recurrent GI blood loss.The patient's goal is not undergo recurrent invasive tests at her age and to stay out of the hospital. This is understandable and will help with that as best we can.        Recommendations:  Continue octreotide 50 mcg sub-q TID while admitted  Hematology consultation -- patient is transfusion depending and needs frequent transfusions for recurrent AVM blood loss referactory to medical/endoscopic therapy.  Diet as tolerated  Antiplt/anticoagulation if deemed high risk without      S Abram Gruber MD      Results:     Lab Results   Component Value Date    WBC 14.4 (H) 02/02/2025    HGB 8.7 (L) 02/02/2025    HCT 26.9 (L) 02/02/2025    .0 02/02/2025    CREATSERUM 0.95 02/02/2025    BUN 20 02/02/2025     02/02/2025    K 3.2 (L) 02/02/2025     02/02/2025    CO2 24.0 02/02/2025     (H) 02/02/2025    CA 8.9 02/02/2025    ALB 3.2 01/31/2025    ALKPHO 71 01/31/2025    BILT 0.5 01/31/2025    TP 6.2 01/31/2025    AST 19 01/31/2025    ALT 7 (L) 01/31/2025     PTT 51.3 (H) 01/30/2025    INR 1.04 01/25/2025    T4F 1.2 01/17/2025    TSH 0.418 (L) 01/17/2025    DDIMER 2.77 (H) 01/25/2025    ESRML >130 (H) 11/29/2024    CRP 3.00 (H) 11/29/2024    MG 2.1 02/01/2025    PHOS 4.0 01/22/2025    B12 814 11/01/2024       XR CHEST AP PORTABLE  (CPT=71045)    Result Date: 2/2/2025  CONCLUSION:  1. Lines are in stable customary positioning. 2. Unchanged coarse interstitial opacities in the mid to upper lungs bilaterally consistent with combined pulmonary fibrosis and emphysema. 3. Stable cardiomegaly.    Dictated by (CST): Abraham Mar MD on 2/02/2025 at 7:43 AM     Finalized by (CST): Abraham Mar MD on 2/02/2025 at 7:45 AM

## 2025-02-02 NOTE — PROGRESS NOTES
[facility]  part of [facility]    Progress Note      Assessment and Plan:   1.  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  The patient required more blood product yesterday and I held the Brilinta again.    I had discussion with the GI service this morning and they had lengthy discussion with the patient and daughter regarding options.  They offered transfer to [place]; however, the patient is disinclined to pursue those further interventions.  Gastroenterology feels that serial transfusion is the best option at this juncture.  They have suggested hematology opinion for this concern.    Recommendations:  1.  Holding Brilinta today  2.  As per neurology.  3.  Permissive hypertension    2.  GI bleed-has had multiple AVMs identified previously.  As above, the options are extremely limited in this scenario and gastroenterology has recommended frequent outpatient transfusion.    Recommendations: Hematology opinion.  Serial hemoglobin.  Octreotide.    3.  Bilateral PE-now has the IVC filter replaced.    Recommendations: Holding anticoagulation at this moment.      4.  Left groin hematoma-conservative management    5.  Chronic respiratory failure-patient has combined pulmonary fibrosis with emphysema.    Recommendations: Advair and conservative management at present    6.  Klebsiella UTI-on cefepime    7.  Port revision-had postprocedural oozing.      Subjective:   Chester Bautista is a(n) 84 year old female who is breathing comfortably.  Without wild hematochezia or melena this morning.    Objective:   Blood pressure (!) 187/70, pulse 103, temperature 98.9 °F (37.2 °C), temperature source Temporal, resp. rate 25, height 5' 1\" (1.549 m), weight 137 lb 6.4 oz (62.3 kg), SpO2 98%.    Physical Exam alert white female  HEENT examination is unremarkable with pupils equal round and reactive to light and accommodation.   Neck without adenopathy,  thyromegaly, JVD nor bruit.   Lungs clear to auscultation and percussion.  Cardiac regular rate and rhythm no murmur.   Abdomen nontender, without hepatosplenomegaly and no mass appreciable.   Extremities without clubbing cyanosis nor edema.   Neurologic grossly intact with symmetric tone and strength and reflex except for lingering right homonymous hemianopsia partial.  Skin without gross abnormality     Results:     Lab Results   Component Value Date    WBC 14.4 02/02/2025    HGB 8.7 02/02/2025    HCT 26.9 02/02/2025    .0 02/02/2025    CREATSERUM 0.95 02/02/2025    BUN 20 02/02/2025     02/02/2025    K 3.2 02/02/2025     02/02/2025    CO2 24.0 02/02/2025     02/02/2025    CA 8.9 02/02/2025       Chapin Parker MD  Medical Director, Critical Care, [facility]  Medical Director, [facility]  Pager: [phone]

## 2025-02-02 NOTE — PLAN OF CARE
Patient had 1x dark stool in the morning, Repeat Hgb is 8.2 at 1340. another 1x dark stool at 1530, Repeat H&H draw at 2000 tonight ordered.     Problem: HEMATOLOGIC - ADULT  Goal: Free from bleeding injury  Description: (Example usage: patient with low platelets)  INTERVENTIONS:  - Avoid intramuscular injections, enemas and rectal medication administration  - Ensure safe mobilization of patient  - Hold pressure on venipuncture sites to achieve adequate hemostasis  - Assess for signs and symptoms of internal bleeding  - Monitor lab trends  - Patient is to report abnormal signs of bleeding to staff  - Avoid use of toothpicks and dental floss  - Use electric shaver for shaving  - Use soft bristle tooth brush  - Limit straining and forceful nose blowing  Outcome: Progressing     Problem: Diabetes/Glucose Control  Goal: Glucose maintained within prescribed range  Description: INTERVENTIONS:  - Monitor Blood Glucose as ordered  - Assess for signs and symptoms of hyperglycemia and hypoglycemia  - Administer ordered medications to maintain glucose within target range  - Assess barriers to adequate nutritional intake and initiate nutrition consult as needed  - Instruct patient on self management of diabetes  Outcome: Progressing     Problem: METABOLIC/FLUID AND ELECTROLYTES - ADULT  Goal: Glucose maintained within prescribed range  Description: INTERVENTIONS:  - Monitor Blood Glucose as ordered  - Assess for signs and symptoms of hyperglycemia and hypoglycemia  - Administer ordered medications to maintain glucose within target range  - Assess barriers to adequate nutritional intake and initiate nutrition consult as needed  - Instruct patient on self management of diabetes  Outcome: Progressing  Goal: Electrolytes maintained within normal limits  Description: INTERVENTIONS:  - Monitor labs and rhythm and assess patient for signs and symptoms of electrolyte imbalances  - Administer electrolyte replacement as ordered  -  Monitor response to electrolyte replacements, including rhythm and repeat lab results as appropriate  - Fluid restriction as ordered  - Instruct patient on fluid and nutrition restrictions as appropriate  Outcome: Progressing  Goal: Hemodynamic stability and optimal renal function maintained  Description: INTERVENTIONS:  - Monitor labs and assess for signs and symptoms of volume excess or deficit  - Monitor intake, output and patient weight  - Monitor urine specific gravity, serum osmolarity and serum sodium as indicated or ordered  - Monitor response to interventions for patient's volume status, including labs, urine output, blood pressure (other measures as available)  - Encourage oral intake as appropriate  - Instruct patient on fluid and nutrition restrictions as appropriate  Outcome: Progressing     Problem: NEUROLOGICAL - ADULT  Goal: Achieves stable or improved neurological status  Description: INTERVENTIONS  - Assess for and report changes in neurological status  - Initiate measures to prevent increased intracranial pressure  - Maintain blood pressure and fluid volume within ordered parameters to optimize cerebral perfusion and minimize risk of hemorrhage  - Monitor temperature, glucose, and sodium. Initiate appropriate interventions as ordered  Outcome: Progressing  Goal: Achieves maximal functionality and self care  Description: INTERVENTIONS  - Monitor swallowing and airway patency with patient fatigue and changes in neurological status  - Encourage and assist patient to increase activity and self care with guidance from PT/OT  - Encourage visually impaired, hearing impaired and aphasic patients to use assistive/communication devices  Outcome: Progressing     Problem: MUSCULOSKELETAL - ADULT  Goal: Return mobility to safest level of function  Description: INTERVENTIONS:  - Assess patient stability and activity tolerance for standing, transferring and ambulating w/ or w/o assistive devices  - Assist with  transfers and ambulation using safe patient handling equipment as needed  - Ensure adequate protection for wounds/incisions during mobilization  - Obtain PT/OT consults as needed  - Advance activity as appropriate  - Communicate ordered activity level and limitations with patient/family  Outcome: Progressing

## 2025-02-03 ENCOUNTER — APPOINTMENT (OUTPATIENT)
Dept: GENERAL RADIOLOGY | Facility: HOSPITAL | Age: 85
End: 2025-02-03
Attending: INTERNAL MEDICINE
Payer: MEDICARE

## 2025-02-03 LAB
ANION GAP SERPL CALC-SCNC: 8 MMOL/L (ref 0–18)
BASOPHILS # BLD AUTO: 0.07 X10(3) UL (ref 0–0.2)
BASOPHILS NFR BLD AUTO: 0.6 %
BUN BLD-MCNC: 23 MG/DL (ref 9–23)
BUN/CREAT SERPL: 20.7 (ref 10–20)
CALCIUM BLD-MCNC: 8.6 MG/DL (ref 8.7–10.4)
CHLORIDE SERPL-SCNC: 103 MMOL/L (ref 98–112)
CO2 SERPL-SCNC: 25 MMOL/L (ref 21–32)
CREAT BLD-MCNC: 1.11 MG/DL
DEPRECATED RDW RBC AUTO: 57.5 FL (ref 35.1–46.3)
EGFRCR SERPLBLD CKD-EPI 2021: 49 ML/MIN/1.73M2 (ref 60–?)
EOSINOPHIL # BLD AUTO: 0.26 X10(3) UL (ref 0–0.7)
EOSINOPHIL NFR BLD AUTO: 2.4 %
ERYTHROCYTE [DISTWIDTH] IN BLOOD BY AUTOMATED COUNT: 19.2 % (ref 11–15)
GLUCOSE BLD-MCNC: 179 MG/DL (ref 70–99)
GLUCOSE BLDC GLUCOMTR-MCNC: 113 MG/DL (ref 70–99)
GLUCOSE BLDC GLUCOMTR-MCNC: 165 MG/DL (ref 70–99)
GLUCOSE BLDC GLUCOMTR-MCNC: 181 MG/DL (ref 70–99)
GLUCOSE BLDC GLUCOMTR-MCNC: 282 MG/DL (ref 70–99)
GLUCOSE BLDC GLUCOMTR-MCNC: 296 MG/DL (ref 70–99)
HCT VFR BLD AUTO: 25.3 %
HGB BLD-MCNC: 7.9 G/DL
IMM GRANULOCYTES # BLD AUTO: 0.13 X10(3) UL (ref 0–1)
IMM GRANULOCYTES NFR BLD: 1.2 %
LYMPHOCYTES # BLD AUTO: 1.65 X10(3) UL (ref 1–4)
LYMPHOCYTES NFR BLD AUTO: 15.2 %
MCH RBC QN AUTO: 26.3 PG (ref 26–34)
MCHC RBC AUTO-ENTMCNC: 31.2 G/DL (ref 31–37)
MCV RBC AUTO: 84.3 FL
MONOCYTES # BLD AUTO: 1.62 X10(3) UL (ref 0.1–1)
MONOCYTES NFR BLD AUTO: 14.9 %
NEUTROPHILS # BLD AUTO: 7.16 X10 (3) UL (ref 1.5–7.7)
NEUTROPHILS # BLD AUTO: 7.16 X10(3) UL (ref 1.5–7.7)
NEUTROPHILS NFR BLD AUTO: 65.7 %
OSMOLALITY SERPL CALC.SUM OF ELEC: 290 MOSM/KG (ref 275–295)
PLATELET # BLD AUTO: 236 10(3)UL (ref 150–450)
POTASSIUM SERPL-SCNC: 4 MMOL/L (ref 3.5–5.1)
POTASSIUM SERPL-SCNC: 4 MMOL/L (ref 3.5–5.1)
RBC # BLD AUTO: 3 X10(6)UL
SODIUM SERPL-SCNC: 136 MMOL/L (ref 136–145)
WBC # BLD AUTO: 10.9 X10(3) UL (ref 4–11)

## 2025-02-03 PROCEDURE — 99233 SBSQ HOSP IP/OBS HIGH 50: CPT | Performed by: INTERNAL MEDICINE

## 2025-02-03 PROCEDURE — 99232 SBSQ HOSP IP/OBS MODERATE 35: CPT | Performed by: INTERNAL MEDICINE

## 2025-02-03 PROCEDURE — 99223 1ST HOSP IP/OBS HIGH 75: CPT | Performed by: STUDENT IN AN ORGANIZED HEALTH CARE EDUCATION/TRAINING PROGRAM

## 2025-02-03 PROCEDURE — 99233 SBSQ HOSP IP/OBS HIGH 50: CPT | Performed by: HOSPITALIST

## 2025-02-03 PROCEDURE — 71045 X-RAY EXAM CHEST 1 VIEW: CPT | Performed by: INTERNAL MEDICINE

## 2025-02-03 RX ORDER — ASPIRIN 81 MG/1
81 TABLET ORAL DAILY
Status: DISCONTINUED | OUTPATIENT
Start: 2025-02-03 | End: 2025-02-05

## 2025-02-03 NOTE — SPIRITUAL CARE NOTE
Spiritual Care Visit Note    Patient Name: Chester Bautista Date of Spiritual Care Visit: 25   : 1940 Primary Dx: Cerebellar stroke (HCC)       Referred By: Referral From:     Spiritual Care Taxonomy:    Intended Effects: Convey a calming presence    Methods: Encourage self care;Encourage self reflection;Encourage sharing of feelings;Offer emotional support;Offer spiritual/Cheondoism support;Offer support    Interventions: Active listening;Provide hospitality    Visit Type/Summary:     - Spiritual Care: Responded to a request for spiritual care and assessed the patient for spiritual care needs. Consulted with RN prior to visit. Offered empathic listening and emotional support. Patient looks tired, expecting her brother to visit her today. She has family support.  remains available as needed for follow up.    Spiritual Care support can be requested via an Epic consult. For urgent/immediate needs, please contact the On Call  at: [place]: ext [phone]    Dave SMART  Chaplain Resident  [phone]

## 2025-02-03 NOTE — PHYSICAL THERAPY NOTE
PHYSICAL THERAPY TREATMENT NOTE - INPATIENT     Room Number: 217/217-A       Presenting Problem: cerebellar CVA  Co-Morbidities : COPD, diabetes, HTN, RA, GI bleed    Problem List  Principal Problem:    Cerebellar stroke (HCC)  Active Problems:    AVM (arteriovenous malformation) of small bowel, acquired with hemorrhage    Dizziness    Right hand paresthesia    Thrombus of aorta (HCC)    Bilateral pulmonary embolism (HCC)    Acute cystitis without hematuria    Small bowel bleed not requiring more than 4 units of blood in 24 hours, ICU, or surgery    Acute blood loss anemia    Cerebrovascular accident (CVA) due to embolism of precerebral artery (HCC)    Melena    Visual hallucinations      PHYSICAL THERAPY ASSESSMENT   Patient demonstrates fair progress this session, goals  updated to reflect patient performance.      Patient is requiring minimal assist as a result of the following impairments: decreased functional strength, decreased endurance/aerobic capacity, impaired standing dynamic balance, decreased muscular endurance, medical status, and orthostatics .     Patient continues to function below baseline with transfers, gait, and stair negotiation.  Next session anticipate patient to progress transfers, gait, and stair negotiation.  Physical Therapy will continue to follow patient for duration of hospitalization.    Patient continues to benefit from continued skilled PT services: to facilitate return to prior level of function as patient demonstrates high motivation with excellent tolerance to an intensive therapy program .    PLAN DURING HOSPITALIZATION  Nursing Mobility Recommendation : 1 Assist  PT Device Recommendation: Rolling walker;Gait belt  PT Treatment Plan: Bed mobility;Patient education;Family education;Gait training  Frequency (Obs): 5x/week     SUBJECTIVE  \"I still don't feel back to 100%, maybe 75%.\"    OBJECTIVE  Precautions: Bed/chair alarm    WEIGHT BEARING RESTRICTION  none    PAIN ASSESSMENT    Ratin  Location: denies  Management Techniques: Activity promotion;Body mechanics;Repositioning (gentle/light soft tissue mobilization)    BALANCE  Static Sitting: Fair  Dynamic Sitting: Fair -  Static Standing: Poor +  Dynamic Standing: Poor    ACTIVITY TOLERANCE  Pulse: 94  Heart Rate Source: Monitor     BP: 121/55 (supine, 116/62 sitting post SPT, 75/56 standing, 119/52 sitting post stand)  BP Location: Left arm  BP Method: Automatic  Patient Position: Lying     O2 WALK  Oxygen Therapy  SPO2% on Room Air at Rest: 95  SPO2% Ambulation on Room Air: 95    AM-PAC '6-Clicks' INPATIENT SHORT FORM - BASIC MOBILITY  How much difficulty does the patient currently have...  Patient Difficulty: Turning over in bed (including adjusting bedclothes, sheets and blankets)?: A Little   Patient Difficulty: Sitting down on and standing up from a chair with arms (e.g., wheelchair, bedside commode, etc.): A Little   Patient Difficulty: Moving from lying on back to sitting on the side of the bed?: A Little   How much help from another person does the patient currently need...   Help from Another: Moving to and from a bed to a chair (including a wheelchair)?: A Little   Help from Another: Need to walk in hospital room?: A Little   Help from Another: Climbing 3-5 steps with a railing?: A Lot     AM-PAC Score:  Raw Score: 17   Approx Degree of Impairment: 50.57%   Standardized Score (AM-PAC Scale): 42.13   CMS Modifier (G-Code): CK    FUNCTIONAL ABILITY STATUS  Functional Mobility/Gait Assessment  Gait Assistance: Not tested  Distance (ft): deferred 2/2 dizziness and orthostatics standing  Assistive Device: Rolling walker  Pattern: Shuffle  Sit to Stand: minimal assist - cues for forward weight shift, posterior lean with initial sit-to-stand     Skilled Therapy Provided: Patient unable to tolerate ambulating household distances, demonstrates posterior lean with transition to standing, dizziness contributes to increased fall risk.      Patient received seated edge of bed, agreeable to physical therapy. Vital signs monitored as noted above, patient experiencing dizziness with transition to standing, found to be orthostatic with standing, further ambulation deferred at this time . Patient seen for co-treatment with occupational therapy to maximize patient safety and function. Anticipated therapy needs remain appropriate based on the patient's performance, personal factors, and remaining functional impairments.    PATIENT EDUCATION  Education Provided To: Family/Caregiver;Patient  Patient Education: Role of Physical Therapy  Patient's Response to Education: Verbalized Understanding  Family/Caregiver Education: Role of Physical Therapy  Family/Caregiver's Response to Education: Verbalized Understanding    The patient's Approx Degree of Impairment: 50.57% has been calculated based on documentation in the [facility] '6 clicks' Inpatient Daily Activity Short Form.  Research supports that patients with this level of impairment may benefit from a rehab facility.  Final disposition will be made by interdisciplinary medical team.      Patient End of Session: Up in chair;Needs met;Call light within reach;RN aware of session/findings;All patient questions and concerns addressed;Hospital anti-slip socks;Alarm set    CURRENT GOALS   Goals to be met by: 2/23/25  Patient Goal Patient's self-stated goal is: go home, be stronger   Goal #1 Patient is able to demonstrate supine - sit EOB @ level: modified independent      Goal #1   Current Status NOT MET/IN PROGRESS    Goal #2 Patient is able to demonstrate transfers Sit to/from Stand at assistance level: modified independent with walker - rolling      Goal #2  Current Status NOT MET/IN PROGRESS    Goal #3 Patient is able to ambulate 150 feet with assist device: walker - rolling at assistance level: supervision   Goal #3   Current Status NOT MET/IN PROGRESS    Goal #4 Patient will negotiate 10 stairs with rail and  supervision   Goal #4   Current Status Not tested   Goal #5 Patient to demonstrate independence with home activity/exercise instructions provided to patient in preparation for discharge.   Goal #5   Current Status progressing       Therapeutic Activity: 11 minutes      Mariaa Tracy, PT, DPT  [facility]  Rehab Services - Physical Therapy  [phone]

## 2025-02-03 NOTE — DIETARY NOTE
NUTRITION NOTE:     2/3/25: Pt screened at no nutrition risk on admit and again upon rescreen on 1/27. Re-screen for nutrition risk today and noted po intake decline just the last 3 days. Pt well nourished on admit and ate well X 14 days out of 17 days this admit. Visited pt with dtr Gerber at bedside.Current diet rx: Cardiac. Pt and dtr [place] appetite decline last 3 days and eating much less. PTA ate well and weight stable.BMI 25.55 kg/m2 reflects mild overweight. Pt appears well nourished per visual assessment.  Discussed short-term addition of low sugar oral nutrition supplements TID between meals and pt in agreement. Dtr is helping to order for pt and finding foods pt usually likes,and  is limiting sweets/sugars as does at home. RD to monitor po intake and if intake does not improve full assessment to follow.     Rolanda Morales RD, LDN, [facility] ([id number])

## 2025-02-03 NOTE — PLAN OF CARE
Hypoglycemic overnight- asymptomatic and PO glucose given and Dr Lozano notifed. PRN medication given for cough. Safety maintained.     Problem: Diabetes/Glucose Control  Goal: Glucose maintained within prescribed range  Description: INTERVENTIONS:  - Monitor Blood Glucose as ordered  - Assess for signs and symptoms of hyperglycemia and hypoglycemia  - Administer ordered medications to maintain glucose within target range  - Assess barriers to adequate nutritional intake and initiate nutrition consult as needed  - Instruct patient on self management of diabetes  Outcome: Progressing     Problem: Patient/Family Goals  Goal: Patient/Family Long Term Goal  Description: Patient's Long Term Goal: discharge from hospital     Interventions:  - monitor vital signs   - monitor blood glucose levels  - monitor appropriate labs  - pain management   - administer medications per order  - follow MD orders  - diagnostics per order  - update and inform patient and family on plan of care  - discharge planning  - See additional Care Plan goals for specific interventions  Outcome: Progressing  Goal: Patient/Family Short Term Goal  Description: Patient's Short Term Goal: manage pain     Interventions:   - monitor vital signs   - monitor blood glucose levels  - monitor appropriate labs  - pain management   - administer medications per order  - follow MD orders  - diagnostics per order  - update and inform patient and family on plan of care  - See additional Care Plan goals for specific interventions  Outcome: Progressing     Problem: CARDIOVASCULAR - ADULT  Goal: Maintains optimal cardiac output and hemodynamic stability  Description: INTERVENTIONS:  - Monitor vital signs, rhythm, and trends  - Monitor for bleeding, hypotension and signs of decreased cardiac output  - Evaluate effectiveness of vasoactive medications to optimize hemodynamic stability  - Monitor arterial and/or venous puncture sites for bleeding and/or hematoma  - Assess  quality of pulses, skin color and temperature  - Assess for signs of decreased coronary artery perfusion - ex. Angina  - Evaluate fluid balance, assess for edema, trend weights  Outcome: Progressing  Goal: Absence of cardiac arrhythmias or at baseline  Description: INTERVENTIONS:  - Continuous cardiac monitoring, monitor vital signs, obtain 12 lead EKG if indicated  - Evaluate effectiveness of antiarrhythmic and heart rate control medications as ordered  - Initiate emergency measures for life threatening arrhythmias  - Monitor electrolytes and administer replacement therapy as ordered  Outcome: Progressing     Problem: METABOLIC/FLUID AND ELECTROLYTES - ADULT  Goal: Glucose maintained within prescribed range  Description: INTERVENTIONS:  - Monitor Blood Glucose as ordered  - Assess for signs and symptoms of hyperglycemia and hypoglycemia  - Administer ordered medications to maintain glucose within target range  - Assess barriers to adequate nutritional intake and initiate nutrition consult as needed  - Instruct patient on self management of diabetes  Outcome: Progressing  Goal: Electrolytes maintained within normal limits  Description: INTERVENTIONS:  - Monitor labs and rhythm and assess patient for signs and symptoms of electrolyte imbalances  - Administer electrolyte replacement as ordered  - Monitor response to electrolyte replacements, including rhythm and repeat lab results as appropriate  - Fluid restriction as ordered  - Instruct patient on fluid and nutrition restrictions as appropriate  Outcome: Progressing  Goal: Hemodynamic stability and optimal renal function maintained  Description: INTERVENTIONS:  - Monitor labs and assess for signs and symptoms of volume excess or deficit  - Monitor intake, output and patient weight  - Monitor urine specific gravity, serum osmolarity and serum sodium as indicated or ordered  - Monitor response to interventions for patient's volume status, including labs, urine output,  blood pressure (other measures as available)  - Encourage oral intake as appropriate  - Instruct patient on fluid and nutrition restrictions as appropriate  Outcome: Progressing     Problem: SKIN/TISSUE INTEGRITY - ADULT  Goal: Skin integrity remains intact  Description: INTERVENTIONS  - Assess and document risk factors for pressure ulcer development  - Assess and document skin integrity  - Monitor for areas of redness and/or skin breakdown  - Initiate interventions, skin care algorithm/standards of care as needed  Outcome: Progressing

## 2025-02-03 NOTE — PLAN OF CARE
Problem: HEMATOLOGIC - ADULT  Goal: Free from bleeding injury  Description: (Example usage: patient with low platelets)  INTERVENTIONS:  - Avoid intramuscular injections, enemas and rectal medication administration  - Ensure safe mobilization of patient  - Hold pressure on venipuncture sites to achieve adequate hemostasis  - Assess for signs and symptoms of internal bleeding  - Monitor lab trends  - Patient is to report abnormal signs of bleeding to staff  - Avoid use of toothpicks and dental floss  - Use electric shaver for shaving  - Use soft bristle tooth brush  - Limit straining and forceful nose blowing  Outcome: Progressing     Problem: Patient Centered Care  Goal: Patient preferences are identified and integrated in the patient's plan of care  Description: Interventions:  - What would you like us to know as we care for you? From home with daughter   - Provide timely, complete, and accurate information to patient/family  - Incorporate patient and family knowledge, values, beliefs, and cultural backgrounds into the planning and delivery of care  - Encourage patient/family to participate in care and decision-making at the level they choose  - Honor patient and family perspectives and choices  Outcome: Progressing     Problem: Diabetes/Glucose Control  Goal: Glucose maintained within prescribed range  Description: INTERVENTIONS:  - Monitor Blood Glucose as ordered  - Assess for signs and symptoms of hyperglycemia and hypoglycemia  - Administer ordered medications to maintain glucose within target range  - Assess barriers to adequate nutritional intake and initiate nutrition consult as needed  - Instruct patient on self management of diabetes  Outcome: Progressing     Problem: CARDIOVASCULAR - ADULT  Goal: Absence of cardiac arrhythmias or at baseline  Description: INTERVENTIONS:  - Continuous cardiac monitoring, monitor vital signs, obtain 12 lead EKG if indicated  - Evaluate effectiveness of antiarrhythmic and  heart rate control medications as ordered  - Initiate emergency measures for life threatening arrhythmias  - Monitor electrolytes and administer replacement therapy as ordered  Outcome: Progressing     Problem: METABOLIC/FLUID AND ELECTROLYTES - ADULT  Goal: Glucose maintained within prescribed range  Description: INTERVENTIONS:  - Monitor Blood Glucose as ordered  - Assess for signs and symptoms of hyperglycemia and hypoglycemia  - Administer ordered medications to maintain glucose within target range  - Assess barriers to adequate nutritional intake and initiate nutrition consult as needed  - Instruct patient on self management of diabetes  Outcome: Progressing     Problem: NEUROLOGICAL - ADULT  Goal: Achieves stable or improved neurological status  Description: INTERVENTIONS  - Assess for and report changes in neurological status  - Initiate measures to prevent increased intracranial pressure  - Maintain blood pressure and fluid volume within ordered parameters to optimize cerebral perfusion and minimize risk of hemorrhage  - Monitor temperature, glucose, and sodium. Initiate appropriate interventions as ordered  Outcome: Progressing     Problem: PAIN - ADULT  Goal: Verbalizes/displays adequate comfort level or patient's stated pain goal  Description: INTERVENTIONS:  - Encourage pt to monitor pain and request assistance  - Assess pain using appropriate pain scale  - Administer analgesics based on type and severity of pain and evaluate response  - Implement non-pharmacological measures as appropriate and evaluate response  - Consider cultural and social influences on pain and pain management  - Manage/alleviate anxiety  - Utilize distraction and/or relaxation techniques  - Monitor for opioid side effects  - Notify MD/LIP if interventions unsuccessful or patient reports new pain  - Anticipate increased pain with activity and pre-medicate as appropriate  Outcome: Progressing     Problem: SAFETY ADULT - FALL  Goal:  Free from fall injury  Description: INTERVENTIONS:  - Assess pt frequently for physical needs  - Identify cognitive and physical deficits and behaviors that affect risk of falls.  - [place] fall precautions as indicated by assessment.  - Educate pt/family on patient safety including physical limitations  - Instruct pt to call for assistance with activity based on assessment  - Modify environment to reduce risk of injury  - Provide assistive devices as appropriate  - Consider OT/PT consult to assist with strengthening/mobility  - Encourage toileting schedule  Outcome: Progressing     Problem: SKIN/TISSUE INTEGRITY - ADULT  Goal: Skin integrity remains intact  Description: INTERVENTIONS  - Assess and document risk factors for pressure ulcer development  - Assess and document skin integrity  - Monitor for areas of redness and/or skin breakdown  - Initiate interventions, skin care algorithm/standards of care as needed  Outcome: Progressing

## 2025-02-03 NOTE — PROGRESS NOTES
[facility]     Gastroenterology Progress Note    Chester Bautista Patient Status:  Inpatient    1940 MRN [id number]   Location [facility] 2W/SW Attending Sonya Lozano MD   Hosp Day # 16 PCP Heike Sorto MD       Subjective:   Chief complaint: GI bleeding, recurrent anemia    Daughter Gerber at the bedside.     Objective:   Blood pressure 121/56, pulse 83, temperature 97.1 °F (36.2 °C), temperature source Temporal, resp. rate 14, height 5' 1\" (1.549 m), weight 135 lb 3.2 oz (61.3 kg), SpO2 95%. Body mass index is 25.55 kg/m².    Gen: awake, alert patient, NAD  HEENT: EOMI, the sclera appears anicteric  CV: RRR  Lung: no conversational dyspnea   Abdomen: soft NTND abdomen with NABS appreciated   Skin: dry, warm, no jaundice  Ext: no LE edema is evident  Neuro: Alert, oriented x3 and interactive  Psych: calm, cooperative    Assessment and Plan:   84 year old woman with h/o DM-II, hypertension, ?pulm HTN, rheumatoid arthritis, COPD; previous history frontal craniotomy with anterior parafalcine aneurysm clipping; multiple pulmonary emboli on previous CT scan 2024; well known to our service due to previous concern for occult GI bleeding and chronic blood loss anemia, 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    #Recurrent GI AVMs  #Hx of CVA  RA  #COPD  #Hx of PE     discussion-reviewed and addressed any questions the patient and her daughter Gerber may have had.  They will have a family discussion about what the options are moving forward.  The patient herself is leaning towards noninvasive and just receiving IV iron and transfusions as needed.  The octreotide had worked for about the last year or so and she been doing quite well with a stable hemoglobin and no admissions to the hospital.  My suspicion is the use of the blood thinners exacerbated her AVMs and contributed to recent blood count drop.  Discussed invasive procedures, what we can  and can do with these procedures, the risks of AVMs returning also addressed.  Again all questions were answered.  The patient is leaning towards not doing anything invasive.  If she does want to talk further I can see her as an outpatient in the office to review with her and her daughter.  They were appreciative the visit today.     Recommendations:  Continue octreotide 50 mcg sub-q TID while admitted  Hematology consultation -- appreciate input  Diet as tolerated  Antiplt/anticoagulation if deemed high risk without/currently on aspirin only  Follow blood counts     Will sign off, please call or have the pt follow up as outpatient.  If ongoing issues as inpatient or if the pt changes her mind on procedures then re- contact GI.       Mikey Garcia MD      Results:     Lab Results   Component Value Date    WBC 10.9 02/03/2025    HGB 7.9 (L) 02/03/2025    HCT 25.3 (L) 02/03/2025    .0 02/03/2025    CREATSERUM 1.11 (H) 02/03/2025    BUN 23 02/03/2025     02/03/2025    K 4.0 02/03/2025    K 4.0 02/03/2025     02/03/2025    CO2 25.0 02/03/2025     (H) 02/03/2025    CA 8.6 (L) 02/03/2025    ALB 3.2 01/31/2025    ALKPHO 71 01/31/2025    BILT 0.5 01/31/2025    TP 6.2 01/31/2025    AST 19 01/31/2025    ALT 7 (L) 01/31/2025    PTT 51.3 (H) 01/30/2025    INR 1.04 01/25/2025    T4F 1.2 01/17/2025    TSH 0.418 (L) 01/17/2025    DDIMER 2.77 (H) 01/25/2025    ESRML >130 (H) 11/29/2024    CRP 3.00 (H) 11/29/2024    MG 2.1 02/01/2025    PHOS 4.0 01/22/2025    B12 814 11/01/2024       XR CHEST AP PORTABLE  (CPT=71045)    Result Date: 2/3/2025  CONCLUSION:  1. Cardiomegaly.  Tortuous thoracic aorta. 2. Emphysematous changes and chronic pulmonary fibrotic changes. 3. Slight improved aeration since 2/2/2025 may reflect mild improving superimposed alveolitis.     Dictated by (CST): Louis Moreno MD on 2/03/2025 at 7:53 AM     Finalized by (CST): Louis Moreno MD on 2/03/2025 at 7:56 AM          XR  CHEST AP PORTABLE  (CPT=71045)    Result Date: 2/2/2025  CONCLUSION:  1. Lines are in stable customary positioning. 2. Unchanged coarse interstitial opacities in the mid to upper lungs bilaterally consistent with combined pulmonary fibrosis and emphysema. 3. Stable cardiomegaly.    Dictated by (CST): Abraham Mar MD on 2/02/2025 at 7:43 AM     Finalized by (CST): Abraham Mar MD on 2/02/2025 at 7:45 AM

## 2025-02-03 NOTE — PROGRESS NOTES
[facility]  part of [facility]    Progress Note      Assessment and Plan:   1.  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  The patient required more blood product yesterday and I held the Brilinta again.    I had discussion with the GI service and they had lengthy discussion with the patient and daughter regarding options.  They offered transfer to [place]; however, the patient is disinclined to pursue those further interventions.  Gastroenterology feels that serial transfusion is the best option at this juncture.  They have suggested hematology opinion for this concern.    The hemoglobin has fallen 1 g over the past 24 hours.  I will continue to hold the Brilinta but will add the baby aspirin back.    Recommendations:  1.  Holding Brilinta today  2.  As per neurology.  3.  Permissive hypertension  4.  Continue to follow hemoglobin and resume baby aspirin  5.  Get out of bed to chair.    2.  GI bleed-has had multiple AVMs identified previously.  As above, the options are extremely limited in this scenario and gastroenterology has recommended frequent outpatient transfusion.    Recommendations: Hematology opinion.  Serial hemoglobin.  Octreotide.    3.  Bilateral PE-now has the IVC filter replaced.    Recommendations: Holding anticoagulation at this moment.      4.  Left groin hematoma-conservative management    5.  Chronic respiratory failure-patient has combined pulmonary fibrosis with emphysema.    Recommendations: Advair and conservative management at present    6.  Klebsiella UTI-off cefepime    7.  Port revision-had postprocedural oozing.  Now doing better.    Subjective:   Chester Bautista is a(n) 84 year old female who is breathing comfortably.  Without wild hematochezia or melena this morning.    Objective:   Blood pressure 121/55, pulse 94, temperature 98.4 °F (36.9 °C), temperature source Temporal, resp. rate 26, height 5'  1\" (1.549 m), weight 137 lb 6.4 oz (62.3 kg), SpO2 94%.    Physical Exam alert white female  HEENT examination is unremarkable with pupils equal round and reactive to light and accommodation.   Neck without adenopathy, thyromegaly, JVD nor bruit.   Lungs clear to auscultation and percussion.  Cardiac regular rate and rhythm no murmur.   Abdomen nontender, without hepatosplenomegaly and no mass appreciable.   Extremities without clubbing cyanosis nor edema.   Neurologic grossly intact with symmetric tone and strength and reflex except for lingering right homonymous hemianopsia partial.  Skin without gross abnormality     Results:     Lab Results   Component Value Date    WBC 10.9 02/03/2025    HGB 7.9 02/03/2025    HCT 25.3 02/03/2025    .0 02/03/2025    CREATSERUM 1.11 02/03/2025    BUN 23 02/03/2025     02/03/2025    K 4.0 02/03/2025    K 4.0 02/03/2025     02/03/2025    CO2 25.0 02/03/2025     02/03/2025    CA 8.6 02/03/2025       Chapin Parker MD  Medical Director, Critical Care, [facility]  Medical Director, [facility]  Pager: [phone]

## 2025-02-03 NOTE — OCCUPATIONAL THERAPY NOTE
OCCUPATIONAL THERAPY TREATMENT NOTE - INPATIENT        Room Number: 217/217-A     Presenting Problem: cva    Problem List  Principal Problem:    Cerebellar stroke (HCC)  Active Problems:    AVM (arteriovenous malformation) of small bowel, acquired with hemorrhage    Dizziness    Right hand paresthesia    Thrombus of aorta (HCC)    Bilateral pulmonary embolism (HCC)    Acute cystitis without hematuria    Small bowel bleed not requiring more than 4 units of blood in 24 hours, ICU, or surgery    Acute blood loss anemia    Cerebrovascular accident (CVA) due to embolism of precerebral artery (HCC)    Melena    Visual hallucinations      OCCUPATIONAL THERAPY ASSESSMENT   Patient demonstrates fair progress this session, goals remain in progress.    Patient is requiring up to Mod A  as a result of the following impairments: endurance, activity tolerance, balance, mobility, weakness.    Patient continues to function below baseline with self care and basic mobility.  Next session anticipate patient to progress with OT POC.  Occupational Therapy will continue to follow patient for duration of hospitalization.    Patient continues to benefit from continued skilled OT services: to facilitate return to prior level of function as patient demonstrates high motivation with excellent tolerance to an intensive therapy program.     PLAN DURING HOSPITALIZATION  OT Device Recommendations: TBD  OT Treatment Plan: Compensatory technique education;Patient/Family training;Patient/Family education;Endurance training;Functional transfer training;ADL training;Cognitive reorientation     SUBJECTIVE  I feel really dizzy     OBJECTIVE  Precautions: Bed/chair alarm    WEIGHT BEARING RESTRICTION     PAIN ASSESSMENT  Ratin    ACTIVITY TOLERANCE  Pulse: 94        BP: 121/55 (supine, 116/62 sitting post SPT, 75/56 standing, 119/52 sitting post stand)[id number]             O2 SATURATIONS  Oxygen Therapy  SPO2% on Room Air at Rest: 95  SPO2% Ambulation  on Room Air: 95    ACTIVITIES OF DAILY LIVING ASSESSMENT  AM-PAC ‘6-Clicks’ Inpatient Daily Activity Short Form  How much help from another person does the patient currently need…  -   Putting on and taking off regular lower body clothing?: A Little  -   Bathing (including washing, rinsing, drying)?: A Lot  -   Toileting, which includes using toilet, bedpan or urinal? : A Little  -   Putting on and taking off regular upper body clothing?: A Lot  -   Taking care of personal grooming such as brushing teeth?: A Little  -   Eating meals?: A Little    AM-PAC Score:  Score: 16  Approx Degree of Impairment: 53.32%  Standardized Score (AM-PAC Scale): 35.96  CMS Modifier (G-Code): CK       Skilled Therapy Provided:    ADLs:     LE Dressing: Min A  Toileting: Min A  UE Dressing: SBA  Bathing: Min A      Patient care coordinated with PT; RN provided consent to proceed with treatment; pt c/o dizziness; pt benefits from activity pacing and reset breaks; demo'd good functional reach to adjust socks but increased dizziness symptoms with head position changes; educated to compelte figure 4 vs forward reach; assisted x2 person hand held to bedside chair; after resting, pt was able to stand with Min A, however BP dropping from 116/62 to 75/56- RN notified; pt educated on activity in chair and goals for OOB activities. Stable at exit; BP improving to 119/52    EDUCATION PROVIDED  Patient Education : Plan of Care; Functional Transfer Techniques; Fall Prevention  Patient's Response to Education: Requires Further Education  Family/Caregiver's Response to Education: Verbalized Understanding    The patient's Approx Degree of Impairment: 53.32% has been calculated based on documentation in the [facility] '6 clicks' Inpatient Daily Activity Short Form.  Research supports that patients with this level of impairment may benefit from IR .  Final disposition will be made by interdisciplinary medical team.    Patient End of Session: Up in chair    OT  Goals:    OT Goals:   Patient will complete functional transfer with mod I least restrictive Assistive device    Comment: Pt required min a    Patient will complete toileting with mod I  Comment: Pt required set up    Patient will tolerate standing for 5 minutes in prep for adls with mod I   Comment:Pt maintained static standing w/rw and cga ~ 1 min    Patient will complete LB dressing mod I   Comment:pt required min a          Goals  on: 2/3/25    OT Session Time: 23 minutes  Self-Care Home Management: 0 minutes  Therapeutic Activity: 23 minutes    Jam Ashford Occupational Therapist, OTR/L ext [phone]

## 2025-02-03 NOTE — PROGRESS NOTES
Progress Note     Chester Bautista Patient Status:  Inpatient    1940 MRN [id number]   Location [facility] 2W/SW Attending Sonya Lozano MD   Hosp Day # 16 PCP Heike Sorto MD     Chief Complaint: patient presented with No chief complaint on file.      Subjective:   S: Patient is doing fine, no issues reported per RN. She is awake, alert, following commands  : pt had one dark color BM this am     Review of Systems:   10 point ROS completed and was negative, except for pertinent positive and negatives stated in subjective.    Objective:   Vital signs:  Temp:  [97.5 °F (36.4 °C)-98.4 °F (36.9 °C)] 97.5 °F (36.4 °C)  Pulse:  [] 83  Resp:  [15-28] 25  BP: ()/(52-69) 117/53  SpO2:  [87 %-98 %] 97 %    Wt Readings from Last 6 Encounters:   25 137 lb 6.4 oz (62.3 kg)   25 147 lb 14.9 oz (67.1 kg)   25 143 lb (64.9 kg)   25 139 lb (63 kg)   24 146 lb (66.2 kg)   24 144 lb 12.8 oz (65.7 kg)         Physical Exam:    General: No acute distress. Alert ,   central venous catheter in place     Respiratory: Clear to auscultation bilaterally. No wheezes. No rhonchi.  Cardiovascular: S1, S2. Regular rate and rhythm. No murmurs, rubs or gallops.   Abdomen: Soft, nontender, nondistended.  Positive bowel sounds. No rebound or guarding.  Neurologic: No focal neurological deficits.   Musculoskeletal: Moves all extremities.  Extremities: No edema.    Results:   Diagnostic Data:      Labs:    Labs Last 24 Hours:   BMP     CBC    Other     Na 136 Cl 103 BUN 23 Glu 179   Hb 7.9   PTT - Procal -   K 4.0; 4.0 CO2 25.0 Cr 1.11   WBC 10.9 >< .0  INR - CRP -   Renal Lytes Endo    Hct 25.3   Trop - D dim -   eGFR - Ca 8.6 POC Gluc  165    LFT   pBNP - Lactic -   eGFR AA - PO4 - A1c -   AST - APk - Prot -  LDL -     Mg - TSH -   ALT - T russell - Alb -        COVID-19 Lab Results    COVID-19  Lab Results   Component Value Date    COVID19 Not Detected 2025     COVID19 Not Detected 02/02/2024    COVID19 Not Detected 12/07/2023       Pro-Calcitonin  No results for input(s): \"PCT\" in the last 168 hours.    Cardiac  No results for input(s): \"TROP\", \"PBNP\" in the last 168 hours.    Creatinine Kinase  No results for input(s): \"CK\" in the last 168 hours.    Inflammatory Markers  No results for input(s): \"CRP\", \"ROSALIE\", \"LDH\", \"DDIMER\" in the last 168 hours.      Imaging: Imaging data reviewed in Epic.    Medications:    aspirin  81 mg Oral Daily    insulin aspart  1-7 Units Subcutaneous TID CC    sodium ferric gluconate  125 mg Intravenous Daily    [Held by provider] ticagrelor  90 mg Oral BID    pantoprazole  40 mg Oral BID AC    melatonin  3 mg Oral Nightly    carvedilol  25 mg Oral BID with meals    lidocaine-menthol  1 patch Transdermal Daily    octreoTIDe  50 mcg Subcutaneous Q8H    rosuvastatin  10 mg Oral Nightly    sennosides  8.6 mg Oral BID    cetirizine  5 mg Oral Daily    docusate sodium  100 mg Oral BID    fluticasone-salmeterol  1 puff Inhalation 2 times per day    gabapentin  100 mg Oral Nightly    montelukast  10 mg Oral Nightly    predniSONE  5 mg Oral Daily    losartan  50 mg Oral Daily    hydroxychloroquine  300 mg Oral Daily    insulin degludec  10 Units Subcutaneous Nightly       Assessment & Plan:   ASSESSMENT / PLAN:       #Anemia  -in the setting of PE and aortic arch thrombus, the decision was made to resume anticoagulation despite the patients history of bleeding AVMs  -monitor H&H, transfuse as indicated  -GI on consult                Start octreotide 50 mcg TID  Continue Brilinta as the benefit of AC are >> risks of bleeding  Possible endoscopic eval vs transfer to a tertiary care center for balloon enteroscopy and colonoscopy  Currently on a heparin drip  Monitor H&H  -transfused pRBC (total 2U pRBC and 1U platelets since admission)  -d/w patient and GI  -Would not want endoscopic evaluations given the rapidity that her AVMs recur seen by hematology  appreciate their evaluation  -Plan close outpatient follow-up and serial transfusions at this juncture.       #Left Cerebellar Infarcts  #SAH  - pt with new sudden onset neuro changes  - 2 small cerebellar infarcts seen on admission MRI  -Patient already took 3 baby aspirin's prior to coming to ED   - CTA negative for LVO, but showing small subsegmental Pes  - Stroke alert called 1/19 -> stroke CT non-acute, MRI showing small CVA in L thalamus   - f/u lipid panel, A1c, TSH, ECHO   - Neuro on consult  -Continue statin  -Brillinta resumed 01/26/2025  -high risk morbidity/mortality given her history of GI bleed and now need for antiplatelet therapy and potential anticoagulation in the future. Family is aware  -gradually lower BP  -Vascular surgery on consult  -s/p stent placement 10/24/2025  -off brillinta-->d/w vascular surgery ok with asa 81mg  -CT head reviewed, repeat CT pending  -Appreciate further neuro and vascular surgery input  -continue present management  1/31: d/w Dr Kang (neuro)recommends DC heparin, dc Asp, Resume Brilinta   2/2: no new issues,  brilinta on hold, will dw neurology and vascular surgery re: plan for antiplatelet therapy     #Dizziness  Improved   -Likely in setting of above  -Meclizine prn   -Neurology consulted                -continue brilinta and statin     #PE   -Patient is hemodynamically stable, EKG NSR and troponin not elevated.  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 Already took 3 baby aspirins prior to admission  -Previously had IVC filter placed in March 2024 for PE at that time (removed June 2024).   -Needs eval for hypercoagulable state. Risk factors of recent car ride, overall sedentary state, breast cancer. 1/31: heparin is dc. Pulmonary following   2/1 : s/p IVC filter on 1/31      #Descending aortic arch thrombus   -Admission CTA chest revealing mural thrombus throughout the thoracic and abdominal aorta,  chronic per radiology  -Vascular surgery on consult  -s/p angiogram 01/24/2025  -cont statin for now   -Aspirin daily     #HTN  -Hold home antihypertensives to allow for permissive hypertension poststroke     #Klebsiella UTI  -nitrofurantoin initiated in ED, UCx reviewed - nitrofurantoin resistant  -started on IV Cefepime - completed course of treatment     #Constipation  -bowel regimen ordered      Chronic Medical Problems  HTN  HLD  DM  COPD/?sarcoidosis   Rheumatoid Arthritis  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   Estimated date of discharge: To be determined  Discharge is dependent on: Improved clinical status  At this point Patient is expected to be discharge to: acute rehab      Plan of care discussed with Patient and RN.     Coordinated care with providers and counseling re: treatment plan and workup     VIDYA CORDOBA MD      Supplementary Documentation:       I personally reviewed the available laboratories, imaging including operative report. I discussed/will discuss the case with patient and her nurse. I ordered laboratories studies. I adjusted medications including not applicable today. Medical decision making high, risk is high.     >55min spent, >50% spent counseling and coordinating care in the form of educating pt/family and d/w consultants and staff. Most of the time spent discussing the above plan.

## 2025-02-03 NOTE — CM/SW NOTE
JENNIFER received updated from Dr Santos that P2P was done and was approved. Update provided to Nellie at [facility]. Awaiting confirmation on approval on their end with auth dates. Clinical updates sent via aidin.     Patient not medically ready for discharge.     Plan: [facility] for AIR pending medical clearance    Madelin Parekh, RN, BSN

## 2025-02-03 NOTE — CONSULTS
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    Review of Systems:  Hematology/Oncology ROS performed and negative except as above in HPI    History/Other:   Past Medical History:  Past Medical History:    Anxiety state    Cancer (HCC)    breast cancer 2018    Cerebellar stroke (HCC)    Chronic obstructive pulmonary disease, unspecified (HCC)    Chronic obstructive pulmonary disease, unspecified (HCC)    COPD (chronic obstructive pulmonary disease) (HCC)    Cystic thyroid nodule    Diabetes (HCC)    Diabetes mellitus (HCC)    Essential hypertension    Exposure to medical diagnostic radiation    High blood pressure    High cholesterol    History of blood transfusion    low hemoglobin    Osteoarthritis    PONV (postoperative nausea and vomiting)    Pulmonary embolism (HCC)    Pulmonary emphysema (HCC)    Rheumatoid arthritis (HCC)       Past Surgical History:  Past Surgical History:   Procedure Laterality Date    Cataract extraction w/  intraocular lens implant Bilateral 2017    [facility]     Colonoscopy      Colonoscopy N/A 2022    Procedure: COLONOSCOPY;  Surgeon: Mikey Garcia MD;  Location: [facility] ENDOSCOPY    Colonoscopy N/A 06/15/2023    Procedure: COLONOSCOPY;  Surgeon: Mikey Garcia MD;  Location: [facility] ENDOSCOPY    Correct bunion,simple      right foot      Egd  2023    Dr. Garcia; Duodenal AVM's x4 cauterized    Hysterectomy      , no abnormal Paps, due to heavy bleeding with  fibroids.  Not sure if it had bilateral salpingo-oophorectomy.    Ir aneurysm repairm  2010    Computer assisted volumetric craniofomy with clipping of anterior cerebral artery.    Lumpectomy right      Radiation right      Rotator cuff repair      1993    Total knee replacement Right 2010    Total knee replacement Left 07/02/2015    Transoral incisionless fundoplication - internal  01/25/2012 Fredy fundoplication at [facility] Dr. Fournier.    Fredy fundoplication at [facility] Dr. Fournier.    Trigger finger release      left hand  2005    Yag capsulotomy - ou - both eyes Bilateral 09/2021    done in [place]       Current Medications:   aspirin DR tab 81 mg  81 mg Oral Daily    insulin aspart (NovoLOG) 100 Units/mL FlexPen 1-7 Units  1-7 Units Subcutaneous TID CC    sodium ferric gluconate (Ferrlecit) 125 mg in sodium chloride 0.9% 100mL IVPB premix  125 mg Intravenous Daily    [COMPLETED] potassium chloride 40 mEq in 250mL sodium chloride 0.9% IVPB premix  40 mEq Intravenous Once    [COMPLETED] sodium chloride 0.9% infusion   Intravenous Once    guaiFENesin (Robitussin) 100 MG/5 ML oral liquid 200 mg  200 mg Oral Q4H PRN    [COMPLETED] magnesium sulfate 4 g/100mL IVPB premix 4 g  4 g Intravenous Once    [COMPLETED] potassium chloride (Klor-Con M20) tab 40 mEq  40 mEq Oral Once    [Held by provider] ticagrelor (Brilinta) tab 90 mg  90 mg Oral BID    pantoprazole (Protonix) DR tab 40 mg  40 mg Oral BID AC    [COMPLETED] fentaNYL (Sublimaze) 50 mcg/mL injection        [COMPLETED] heparin in sodium chloride 0.9% (Porcine) 2 Units/mL flush bag premix        [COMPLETED] lidocaine PF (Xylocaine-MPF) 2 % injection        [COMPLETED] iopamidol (ISOVUE-300) 61 % injection 100 mL  100 mL Intravascular ONCE PRN    [COMPLETED] sodium chloride 0.9% infusion   Intravenous Once    [COMPLETED] potassium chloride 20 mEq/100mL IVPB premix 20 mEq  20 mEq Intravenous Once    [COMPLETED] magnesium oxide  (Mag-Ox) tab 400 mg  400 mg Oral Once    [COMPLETED] ceFEPIme (Maxipime) 1 g in sodium chloride 0.9% 100 mL IVPB-MBP  1 g Intravenous Q12H    [COMPLETED] magnesium sulfate in sterile water for injection 2 g/50mL IVPB premix 2 g  2 g Intravenous Once    magnesium hydroxide (Milk of Magnesia) 400 MG/5ML oral suspension 30 mL  30 mL Oral Daily PRN    bisacodyl (Dulcolax) 10 MG rectal suppository 10 mg  10 mg Rectal Daily PRN    fleet enema (Fleet) rectal enema 133 mL  1 enema Rectal Once PRN    [COMPLETED] midazolam (Versed) 2 MG/2ML injection        [COMPLETED] fentaNYL (Sublimaze) 50 mcg/mL injection        [COMPLETED] heparin in sodium chloride 0.9% (Porcine) 2 Units/mL flush bag premix        [COMPLETED] lidocaine (Xylocaine) 2 % injection        [COMPLETED] heparin (Porcine) 100 Units/mL lock flush        [COMPLETED] heparin (Porcine) 100 Units/mL lock flush        melatonin tab 3 mg  3 mg Oral Nightly    [COMPLETED] potassium chloride (Klor-Con M20) tab 40 mEq  40 mEq Oral Q4H    [COMPLETED] magnesium oxide (Mag-Ox) tab 400 mg  400 mg Oral Once    [COMPLETED] heparin (Porcine) 40962 units/250 mL infusion (STROKE/NEURO) INITIAL DOSE  14 Units/kg/hr Intravenous Once    labetalol (Trandate) 5 mg/mL injection 10 mg  10 mg Intravenous Q4H PRN    carvedilol (Coreg) tab 25 mg  25 mg Oral BID with meals    [COMPLETED] potassium chloride (Klor-Con) 20 MEQ oral powder 40 mEq  40 mEq Oral Q4H    [COMPLETED] magnesium oxide (Mag-Ox) tab 800 mg  800 mg Oral Once    [COMPLETED] iopamidol (ISOVUE-370) 76 % injection 15 mL  15 mL Other ONCE PRN    [] heparin (Porcine) 1000 UNIT/ML injection        [COMPLETED] potassium chloride 40 mEq in 250mL sodium chloride 0.9% IVPB premix  40 mEq Intravenous Once    Followed by    [COMPLETED] potassium chloride 20 mEq/100mL IVPB premix 20 mEq  20 mEq Intravenous Once    [COMPLETED] magnesium sulfate in sterile water for injection 2 g/50mL IVPB premix 2 g  2 g Intravenous Once     glucose (Dex4) 15 GM/59ML oral liquid 15 g  15 g Oral Q15 Min PRN    Or    glucose (Glutose) 40% oral gel 15 g  15 g Oral Q15 Min PRN    Or    glucose-vitamin C (Dex-4) chewable tab 4 tablet  4 tablet Oral Q15 Min PRN    Or    dextrose 50% injection 50 mL  50 mL Intravenous Q15 Min PRN    Or    glucose (Dex4) 15 GM/59ML oral liquid 30 g  30 g Oral Q15 Min PRN    Or    glucose (Glutose) 40% oral gel 30 g  30 g Oral Q15 Min PRN    Or    glucose-vitamin C (Dex-4) chewable tab 8 tablet  8 tablet Oral Q15 Min PRN    lidocaine-menthol 4-1 % patch 1 patch  1 patch Transdermal Daily    [COMPLETED] potassium chloride 20 mEq/100mL IVPB premix 20 mEq  20 mEq Intravenous Once    [COMPLETED] midazolam (Versed) 2 MG/2ML injection        [COMPLETED] lidocaine (cardiac) (Xylocaine) 20 mg/mL injection        [COMPLETED] atropine 0.1 MG/ML injection        [COMPLETED] EPINEPHrine (Adrenalin) 1 MG/10ML (0.1 MG/ML) injection (Cardiac Arrest)        octreoTIDe (SandoSTATIN) 50 mcg/mL injection 50 mcg  50 mcg Subcutaneous Q8H    [COMPLETED] iopamidol 76% (ISOVUE-370) injection for power injector  65 mL Intravenous ONCE PRN    [COMPLETED] heparin in sodium chloride 0.9% (Porcine) 2 Units/mL flush bag premix        [COMPLETED] lidocaine (Xylocaine) 2 % injection        [COMPLETED] heparin (Porcine) 1000 UNIT/ML injection        [COMPLETED] midazolam (Versed) 2 MG/2ML injection        [COMPLETED] fentaNYL (Sublimaze) 50 mcg/mL injection        [COMPLETED] heparin in sodium chloride 0.9% (Porcine) 2 Units/mL flush bag premix        [COMPLETED] midazolam (Versed) 2 MG/2ML injection        [COMPLETED] protamine 10 mg/mL injection        [COMPLETED] iopamidol (ISOVUE-300) 61 % injection 320 mL  320 mL Injection ONCE PRN    [COMPLETED] ticagrelor (Brilinta) 90 MG tab        HYDROmorphone (Dilaudid) 1 MG/ML injection 0.2 mg  0.2 mg Intravenous Q4H PRN    [COMPLETED] sodium chloride 0.9% infusion   Intravenous Once    [COMPLETED] lidocaine  (cardiac) (Xylocaine) 20 mg/mL injection        [COMPLETED] atropine 0.1 MG/ML injection        [COMPLETED] EPINEPHrine (Adrenalin) 1 MG/10ML (0.1 MG/ML) injection (Cardiac Arrest)        [COMPLETED] sodium chloride 0.9% infusion   Intravenous Once    [COMPLETED] magnesium sulfate in sterile water for injection 2 g/50mL IVPB premix 2 g  2 g Intravenous Once    [COMPLETED] potassium chloride 40 mEq in 250mL sodium chloride 0.9% IVPB premix  40 mEq Intravenous Once    [COMPLETED] magnesium oxide (Mag-Ox) tab 800 mg  800 mg Oral Once    [COMPLETED] magnesium oxide (Mag-Ox) tab 400 mg  400 mg Oral Once    [COMPLETED] potassium chloride (Klor-Con M20) tab 40 mEq  40 mEq Oral Once    rosuvastatin (Crestor) tab 10 mg  10 mg Oral Nightly    sennosides (Senokot) tab 8.6 mg  8.6 mg Oral BID    [COMPLETED] LORazepam (Ativan) tab 0.5 mg  0.5 mg Oral PRN    [COMPLETED] magnesium oxide (Mag-Ox) tab 800 mg  800 mg Oral Once    cetirizine (ZyrTEC) tab 5 mg  5 mg Oral Daily    acetaminophen (Tylenol Extra Strength) tab 500 mg  500 mg Oral Q6H PRN    [COMPLETED] clopidogrel (Plavix) tab 150 mg  150 mg Oral Once    docusate sodium (Colace) cap 100 mg  100 mg Oral BID    [COMPLETED] potassium chloride (Klor-Con M20) tab 40 mEq  40 mEq Oral Q4H    [COMPLETED] alteplase (Activase) 2 mg in sterile water for injection (PF) 2.2 mL IV push to declot line  2 mg Intravenous Once    meclizine (Antivert) tab 12.5 mg  12.5 mg Oral TID PRN    [] sodium chloride 0.9% infusion   Intravenous Continuous    acetaminophen (Tylenol) tab 650 mg  650 mg Oral Q4H PRN    Or    acetaminophen (Tylenol) rectal suppository 650 mg  650 mg Rectal Q4H PRN    [COMPLETED] labetalol (Trandate) 5 mg/mL injection 10 mg  10 mg Intravenous Q10 Min PRN    hydrALAzine (Apresoline) 20 mg/mL injection 10 mg  10 mg Intravenous Q2H PRN    ondansetron (Zofran) 4 MG/2ML injection 4 mg  4 mg Intravenous Q6H PRN    metoclopramide (Reglan) 5 mg/mL injection 5 mg  5 mg  Intravenous Q8H PRN    [COMPLETED] nitrofurantoin monohydrate macro (Macrobid) cap 100 mg  100 mg Oral Once    [COMPLETED] acetaminophen (Tylenol Extra Strength) tab 1,000 mg  1,000 mg Oral Once    acetaminophen (Tylenol) tab 650 mg  650 mg Oral Q6H PRN    ondansetron (Zofran) 4 MG/2ML injection 4 mg  4 mg Intravenous Q6H PRN    albuterol (Ventolin) (2.5 MG/3ML) 0.083% nebulizer solution 2.5 mg  2.5 mg Nebulization Q4H PRN    cyclobenzaprine (Flexeril) tab 10 mg  10 mg Oral Nightly PRN    fluticasone-salmeterol (Advair Diskus) 250-50 MCG/ACT inhaler 1 puff  1 puff Inhalation 2 times per day    gabapentin (Neurontin) cap 100 mg  100 mg Oral Nightly    montelukast (Singulair) tab 10 mg  10 mg Oral Nightly    predniSONE (Deltasone) tab 5 mg  5 mg Oral Daily    losartan (Cozaar) tab 50 mg  50 mg Oral Daily    hydroxychloroquine (Plaquenil) tab 300 mg  300 mg Oral Daily    [COMPLETED] potassium chloride 20 mEq/100mL IVPB premix 20 mEq  20 mEq Intravenous Once    oxyCODONE immediate release tab 5 mg  5 mg Oral Q6H PRN    [COMPLETED] Perflutren Lipid Microsphere (DEFINITY) 6.52 MG/ML injection 1.5 mL  1.5 mL Intravenous ONCE PRN    insulin degludec (Tresiba) 100 units/mL flextouch 10 Units  10 Units Subcutaneous Nightly    [COMPLETED] iopamidol 76% (ISOVUE-370) injection for power injector  65 mL Intravenous ONCE PRN    [COMPLETED] meclizine (Antivert) tab 25 mg  25 mg Oral Once    [COMPLETED] labetalol (Trandate) 5 mg/mL injection 20 mg  20 mg Intravenous Once    [COMPLETED] iopamidol 76% (ISOVUE-370) injection for power injector  80 mL Intravenous ONCE PRN       Allergies:   Allergies[1]    Family Medical History:  Family History   Problem Relation Age of Onset    Heart Surgery Mother         Leaky valve at 41 y/o.     Prostate Cancer Brother     Cancer Brother     Diabetes Maternal Grandmother     Diabetes Paternal Aunt     Breast Cancer Self 78    Breast Cancer Niece         before 50    Macular degeneration Neg      Glaucoma Neg        Social History:  Social History     Socioeconomic History    Marital status:      Spouse name: Not on file    Number of children: Not on file    Years of education: Not on file    Highest education level: Not on file   Occupational History    Not on file   Tobacco Use    Smoking status: Former     Current packs/day: 0.00     Types: Cigarettes     Quit date:      Years since quittin.1     Passive exposure: Never    Smokeless tobacco: Never    Tobacco comments:     [id number]   Vaping Use    Vaping status: Never Used   Substance and Sexual Activity    Alcohol use: Never    Drug use: Never    Sexual activity: Not on file   Other Topics Concern    Not on file   Social History Narrative    Just moved in from [place].   passed away and that is why moved from [place] to be with daughter who lives in [place].  Currently lives with daughter.     Social Drivers of Health     Financial Resource Strain: Low Risk  (10/22/2024)    Financial Resource Strain     Difficulty of Paying Living Expenses: Not very hard     Med Affordability: No   Food Insecurity: No Food Insecurity (2025)    Food Insecurity     Food Insecurity: Never true   Transportation Needs: No Transportation Needs (2025)    Transportation Needs     Lack of Transportation: No     Car Seat: Not on file   Physical Activity: Not on file   Stress: Not on file   Social Connections: Not on file   Housing Stability: Low Risk  (2025)    Housing Stability     Housing Instability: No     Housing Instability Emergency: Not on file     Crib or [place]inette: Not on file       Objective:    /53 (BP Location: Left arm)   Pulse 83   Temp 97.5 °F (36.4 °C) (Temporal)   Resp 25   Ht 1.549 m (5' 1\")   Wt 62.3 kg (137 lb 6.4 oz)   SpO2 97%   BMI 25.96 kg/m²   Physical Exam:  General: A&Ox3, NAD  HEENT: PERRL  CV: RRR  Pulm: normal effort  Extremities: no edema  Neurological: Grossly intact    Labs:  Lab  Results   Component Value Date/Time    WBC 10.9 02/03/2025 04:30 AM    RBC 3.00 (L) 02/03/2025 04:30 AM    HGB 7.9 (L) 02/03/2025 04:30 AM    HCT 25.3 (L) 02/03/2025 04:30 AM    MCV 84.3 02/03/2025 04:30 AM    MCH 26.3 02/03/2025 04:30 AM    MCHC 31.2 02/03/2025 04:30 AM    RDW 19.2 (H) 02/03/2025 04:30 AM    NEPRELIM 7.16 02/03/2025 04:30 AM    .0 02/03/2025 04:30 AM       Lab Results   Component Value Date/Time     (H) 02/03/2025 04:30 AM    BUN 23 02/03/2025 04:30 AM    CREATSERUM 1.11 (H) 02/03/2025 04:30 AM    GFRNAA 47 (L) 05/02/2022 10:10 PM    CA 8.6 (L) 02/03/2025 04:30 AM    ALB 3.2 01/31/2025 03:56 AM     02/03/2025 04:30 AM    K 4.0 02/03/2025 04:30 AM    K 4.0 02/03/2025 04:30 AM     02/03/2025 04:30 AM    CO2 25.0 02/03/2025 04:30 AM    ALKPHO 71 01/31/2025 03:56 AM    AST 19 01/31/2025 03:56 AM    ALT 7 (L) 01/31/2025 03:56 AM       Imaging:  XR CHEST AP PORTABLE  (CPT=71045)    Result Date: 2/3/2025  CONCLUSION:  1. Cardiomegaly.  Tortuous thoracic aorta. 2. Emphysematous changes and chronic pulmonary fibrotic changes. 3. Slight improved aeration since 2/2/2025 may reflect mild improving superimposed alveolitis.     Dictated by (CST): Louis Moreno MD on 2/03/2025 at 7:53 AM     Finalized by (CST): Louis Moreno MD on 2/03/2025 at 7:56 AM          XR CHEST AP PORTABLE  (CPT=71045)    Result Date: 2/2/2025  CONCLUSION:  1. Lines are in stable customary positioning. 2. Unchanged coarse interstitial opacities in the mid to upper lungs bilaterally consistent with combined pulmonary fibrosis and emphysema. 3. Stable cardiomegaly.    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    Complex scenario of balancing bleeding against thrombotic risk.  She has a history of embolic CVA and pulmonary embolism.  She has recurrent GI bleed secondary to AVMs and iron deficiency anemia.    She is on aspirin, Brilinta is being held by her other teams.    I will dose IV iron, check folic acid and B12 in case supplementation is needed.  I did discuss outpatient transfusions as needed and the patient is agreeable for the time being, though is not sure long-term transfusions is a good enough quality of life for her.  In any case, we can address goals of care in the future.    Continue to transfuse for hemoglobin less than 7 while she remains admitted.  Please let me know when she is nearing discharge so we can arrange outpatient follow-up and potential transfusion.      I will follow intermittently while she remains inpatient.     Bradley Magallon, DO    [facility]  [facility]     This note was created using a voice-recognition transcribing system. Incorrect words or phrases may have been missed during proofreading. Please interpret accordingly.       [1]   Allergies  Allergen Reactions    Celebrex [Celecoxib] PALPITATIONS    Penicillins ITCHING and SWELLING    Amlodipine OTHER (SEE COMMENTS)     Calves Swelling     Metformin And Related UNKNOWN    Olmesartan UNKNOWN

## 2025-02-04 PROBLEM — Z51.5 PALLIATIVE CARE ENCOUNTER: Status: ACTIVE | Noted: 2025-02-04

## 2025-02-04 PROBLEM — Z71.89 GOALS OF CARE, COUNSELING/DISCUSSION: Status: ACTIVE | Noted: 2025-02-04

## 2025-02-04 LAB
ANION GAP SERPL CALC-SCNC: 5 MMOL/L (ref 0–18)
ANTIBODY SCREEN: NEGATIVE
BASOPHILS # BLD AUTO: 0.04 X10(3) UL (ref 0–0.2)
BASOPHILS NFR BLD AUTO: 0.5 %
BUN BLD-MCNC: 30 MG/DL (ref 9–23)
BUN/CREAT SERPL: 23.8 (ref 10–20)
CALCIUM BLD-MCNC: 8.4 MG/DL (ref 8.7–10.4)
CHLORIDE SERPL-SCNC: 106 MMOL/L (ref 98–112)
CO2 SERPL-SCNC: 25 MMOL/L (ref 21–32)
CREAT BLD-MCNC: 1.26 MG/DL
DEPRECATED RDW RBC AUTO: 59.7 FL (ref 35.1–46.3)
EGFRCR SERPLBLD CKD-EPI 2021: 42 ML/MIN/1.73M2 (ref 60–?)
EOSINOPHIL # BLD AUTO: 0.32 X10(3) UL (ref 0–0.7)
EOSINOPHIL NFR BLD AUTO: 3.7 %
ERYTHROCYTE [DISTWIDTH] IN BLOOD BY AUTOMATED COUNT: 19.6 % (ref 11–15)
FOLATE SERPL-MCNC: 10.5 NG/ML (ref 5.4–?)
GLUCOSE BLD-MCNC: 158 MG/DL (ref 70–99)
GLUCOSE BLDC GLUCOMTR-MCNC: 152 MG/DL (ref 70–99)
GLUCOSE BLDC GLUCOMTR-MCNC: 165 MG/DL (ref 70–99)
GLUCOSE BLDC GLUCOMTR-MCNC: 230 MG/DL (ref 70–99)
GLUCOSE BLDC GLUCOMTR-MCNC: 240 MG/DL (ref 70–99)
HCT VFR BLD AUTO: 23.5 %
HGB BLD-MCNC: 7.3 G/DL
HGB BLD-MCNC: 8.8 G/DL
IMM GRANULOCYTES # BLD AUTO: 0.1 X10(3) UL (ref 0–1)
IMM GRANULOCYTES NFR BLD: 1.2 %
LYMPHOCYTES # BLD AUTO: 1.47 X10(3) UL (ref 1–4)
LYMPHOCYTES NFR BLD AUTO: 16.9 %
MAGNESIUM SERPL-MCNC: 1.4 MG/DL (ref 1.6–2.6)
MCH RBC QN AUTO: 26.4 PG (ref 26–34)
MCHC RBC AUTO-ENTMCNC: 31.1 G/DL (ref 31–37)
MCV RBC AUTO: 84.8 FL
MONOCYTES # BLD AUTO: 1.05 X10(3) UL (ref 0.1–1)
MONOCYTES NFR BLD AUTO: 12.1 %
NEUTROPHILS # BLD AUTO: 5.7 X10 (3) UL (ref 1.5–7.7)
NEUTROPHILS # BLD AUTO: 5.7 X10(3) UL (ref 1.5–7.7)
NEUTROPHILS NFR BLD AUTO: 65.6 %
OSMOLALITY SERPL CALC.SUM OF ELEC: 291 MOSM/KG (ref 275–295)
PLATELET # BLD AUTO: 225 10(3)UL (ref 150–450)
POTASSIUM SERPL-SCNC: 3.9 MMOL/L (ref 3.5–5.1)
RBC # BLD AUTO: 2.77 X10(6)UL
RH BLOOD TYPE: POSITIVE
SODIUM SERPL-SCNC: 136 MMOL/L (ref 136–145)
VIT B12 SERPL-MCNC: 1058 PG/ML (ref 211–911)
WBC # BLD AUTO: 8.7 X10(3) UL (ref 4–11)

## 2025-02-04 PROCEDURE — 99232 SBSQ HOSP IP/OBS MODERATE 35: CPT | Performed by: INTERNAL MEDICINE

## 2025-02-04 PROCEDURE — 99233 SBSQ HOSP IP/OBS HIGH 50: CPT | Performed by: INTERNAL MEDICINE

## 2025-02-04 PROCEDURE — 99223 1ST HOSP IP/OBS HIGH 75: CPT | Performed by: NURSE PRACTITIONER

## 2025-02-04 PROCEDURE — 99233 SBSQ HOSP IP/OBS HIGH 50: CPT | Performed by: HOSPITALIST

## 2025-02-04 RX ORDER — CARVEDILOL 12.5 MG/1
12.5 TABLET ORAL 2 TIMES DAILY WITH MEALS
Status: DISCONTINUED | OUTPATIENT
Start: 2025-02-04 | End: 2025-02-05

## 2025-02-04 RX ORDER — MAGNESIUM OXIDE 400 MG/1
800 TABLET ORAL ONCE
Status: COMPLETED | OUTPATIENT
Start: 2025-02-04 | End: 2025-02-04

## 2025-02-04 RX ORDER — LOSARTAN POTASSIUM 25 MG/1
25 TABLET ORAL DAILY
Status: DISCONTINUED | OUTPATIENT
Start: 2025-02-05 | End: 2025-02-05

## 2025-02-04 RX ORDER — SODIUM CHLORIDE 9 MG/ML
INJECTION, SOLUTION INTRAVENOUS ONCE
Status: COMPLETED | OUTPATIENT
Start: 2025-02-04 | End: 2025-02-04

## 2025-02-04 NOTE — CONSULTS
[facility]  part of [facility]  Palliative Care Initial Consult Note    Chester Bautista Patient Status:  Inpatient    1940 MRN [id number]   Location [facility] 2W/SW Attending Casa Santos MD   Hosp Day # 17 PCP Heike Sorto MD     Date of Consult: 2025  Patient seen at: [facility] Inpatient    The  Cures Act makes medical notes like these available to patients in the interest of transparency. Please be advised this is a medical document. Medical documents are intended to carry relevant information, facts as evident, and the clinical opinion of the practitioner. The medical note is intended as peer to peer communication and may appear blunt or direct. It is written in medical language and may contain abbreviations or verbiage that are unfamiliar.     Reason for Consultation: Consult ordered by:: Dr Santos for evaluation of Palliative Care needs and goals of care discussion.     Subjective     History of Present Illness: Chester Bautista is a 84 year old female with history of HTN, HLD, DM, COPD, arthritis, hx of prior DVT PE s/p IVC filter, Anemia, breast cancer, arthritis, right breast cancer, GI AVM's  who was admitted on 2025 for dizziness and right arm tingling. Work up in our hospital revealed Left Cerebellar infarcts, dizziness, PE, descending aortic arch thrombus.  History was obtained from Epic and patient.      This is the 3rd hospitalization in the past 3 months.    Today is day #17 of hospitalization.     When I entered the room, Prudencio is sitting up in the chair, she is awake and alert, + for nausea. RN provided Zofran on exam.     Palliative Care symptom needs assessed:   Pertinent items are noted in HPI/below    Fatigue:  Prudencio stated prior to hospitalization she was up ambulating and had more energy  Dyspnea: slight dyspnea with increased activity Current O2 therapy: 2 liters NC  Cough: + cough  Pain Present:  denies  Non-verbal signs of pain present: NO  Appetite: poor  Constipation: denies  Diarrhea: denies  Nausea/Vomiting: + nausea on exam  Depression: denies  Anxiety: denies    Palliative Care Social History:   Marital Status:   Children: 2 dtrs  Living Situation Prior to Admit: lives at home with dtr Brea De La Cruz Patient Confused: No  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 Not Listed  spiritual care following    Past Medical History/Past Surgical History: obtained from [facility]  Past Medical History:    Anxiety state    Cancer (HCC)    breast cancer 2018    Cerebellar stroke (HCC)    Chronic obstructive pulmonary disease, unspecified (HCC)    Chronic obstructive pulmonary disease, unspecified (HCC)    COPD (chronic obstructive pulmonary disease) (HCC)    Cystic thyroid nodule    Diabetes (HCC)    Diabetes mellitus (HCC)    Essential hypertension    Exposure to medical diagnostic radiation    High blood pressure    High cholesterol    History of blood transfusion    low hemoglobin    Osteoarthritis    PONV (postoperative nausea and vomiting)    Pulmonary embolism (HCC)    Pulmonary emphysema (HCC)    Rheumatoid arthritis (HCC)     Past Surgical History:   Procedure Laterality Date    Cataract extraction w/  intraocular lens implant Bilateral 2017    Dr in [facility]     Colonoscopy      Colonoscopy N/A 07/21/2022    Procedure: COLONOSCOPY;  Surgeon: Mikey Garcia MD;  Location: [facility] ENDOSCOPY    Colonoscopy N/A 06/15/2023    Procedure: COLONOSCOPY;  Surgeon: Mikey Garcia MD;  Location: [facility] ENDOSCOPY    Correct bunion,simple      right foot  1993    Egd  12/21/2023    Dr. Garcia; Duodenal AVM's x4 cauterized    Hysterectomy      1972, no abnormal Paps, due to heavy bleeding with fibroids.  Not  sure if it had bilateral salpingo-oophorectomy.    Ir aneurysm repairm  2010    Computer assisted volumetric craniofomy with clipping of anterior cerebral artery.    Lumpectomy right      Radiation right      Rotator cuff repair      1993    Total knee replacement Right 2010    Total knee replacement Left 07/02/2015    Transoral incisionless fundoplication - internal  01/25/2012 Fredy fundoplication at [facility] Dr. Fournier.    Fredy fundoplication at [facility] Dr. Fournier.    Trigger finger release      left hand  2005    Yag capsulotomy - ou - both eyes Bilateral 09/2021    done in [place]       Nutritional Status:  BMI: 24 Weight: 129lbs  Weight changes: wt loss of 15lbs since October  Current Appetite: poor  Dysphagia: denies    Family History: obtained from [facility]  Family History   Problem Relation Age of Onset    Heart Surgery Mother         Leaky valve at 39 y/o.     Prostate Cancer Brother     Cancer Brother     Diabetes Maternal Grandmother     Diabetes Paternal Aunt     Breast Cancer Self 78    Breast Cancer Niece         before 50    Macular degeneration Neg     Glaucoma Neg        Allergies:  Allergies[1]    Medications:     Current Facility-Administered Medications:     aspirin DR tab 81 mg, 81 mg, Oral, Daily    insulin aspart (NovoLOG) 100 Units/mL FlexPen 1-7 Units, 1-7 Units, Subcutaneous, TID CC    sodium ferric gluconate (Ferrlecit) 125 mg in sodium chloride 0.9% 100mL IVPB premix, 125 mg, Intravenous, Daily    guaiFENesin (Robitussin) 100 MG/5 ML oral liquid 200 mg, 200 mg, Oral, Q4H PRN    pantoprazole (Protonix) DR tab 40 mg, 40 mg, Oral, BID AC    magnesium hydroxide (Milk of Magnesia) 400 MG/5ML oral suspension 30 mL, 30 mL, Oral, Daily PRN    bisacodyl (Dulcolax) 10 MG rectal suppository 10 mg, 10 mg, Rectal, Daily PRN    fleet enema (Fleet) rectal enema 133 mL, 1 enema, Rectal, Once PRN    melatonin tab 3 mg, 3 mg, Oral, Nightly    labetalol (Trandate) 5 mg/mL  injection 10 mg, 10 mg, Intravenous, Q4H PRN    carvedilol (Coreg) tab 25 mg, 25 mg, Oral, BID with meals    glucose (Dex4) 15 GM/59ML oral liquid 15 g, 15 g, Oral, Q15 Min PRN **OR** glucose (Glutose) 40% oral gel 15 g, 15 g, Oral, Q15 Min PRN **OR** glucose-vitamin C (Dex-4) chewable tab 4 tablet, 4 tablet, Oral, Q15 Min PRN **OR** dextrose 50% injection 50 mL, 50 mL, Intravenous, Q15 Min PRN **OR** glucose (Dex4) 15 GM/59ML oral liquid 30 g, 30 g, Oral, Q15 Min PRN **OR** glucose (Glutose) 40% oral gel 30 g, 30 g, Oral, Q15 Min PRN **OR** glucose-vitamin C (Dex-4) chewable tab 8 tablet, 8 tablet, Oral, Q15 Min PRN    lidocaine-menthol 4-1 % patch 1 patch, 1 patch, Transdermal, Daily    octreoTIDe (SandoSTATIN) 50 mcg/mL injection 50 mcg, 50 mcg, Subcutaneous, Q8H    HYDROmorphone (Dilaudid) 1 MG/ML injection 0.2 mg, 0.2 mg, Intravenous, Q4H PRN    rosuvastatin (Crestor) tab 10 mg, 10 mg, Oral, Nightly    sennosides (Senokot) tab 8.6 mg, 8.6 mg, Oral, BID    cetirizine (ZyrTEC) tab 5 mg, 5 mg, Oral, Daily    acetaminophen (Tylenol Extra Strength) tab 500 mg, 500 mg, Oral, Q6H PRN    docusate sodium (Colace) cap 100 mg, 100 mg, Oral, BID    meclizine (Antivert) tab 12.5 mg, 12.5 mg, Oral, TID PRN    acetaminophen (Tylenol) tab 650 mg, 650 mg, Oral, Q4H PRN **OR** acetaminophen (Tylenol) rectal suppository 650 mg, 650 mg, Rectal, Q4H PRN    hydrALAzine (Apresoline) 20 mg/mL injection 10 mg, 10 mg, Intravenous, Q2H PRN    ondansetron (Zofran) 4 MG/2ML injection 4 mg, 4 mg, Intravenous, Q6H PRN    metoclopramide (Reglan) 5 mg/mL injection 5 mg, 5 mg, Intravenous, Q8H PRN    acetaminophen (Tylenol) tab 650 mg, 650 mg, Oral, Q6H PRN    ondansetron (Zofran) 4 MG/2ML injection 4 mg, 4 mg, Intravenous, Q6H PRN    albuterol (Ventolin) (2.5 MG/3ML) 0.083% nebulizer solution 2.5 mg, 2.5 mg, Nebulization, Q4H PRN    cyclobenzaprine (Flexeril) tab 10 mg, 10 mg, Oral, Nightly PRN    fluticasone-salmeterol (Advair Diskus) 250-50  MCG/ACT inhaler 1 puff, 1 puff, Inhalation, 2 times per day    gabapentin (Neurontin) cap 100 mg, 100 mg, Oral, Nightly    montelukast (Singulair) tab 10 mg, 10 mg, Oral, Nightly    predniSONE (Deltasone) tab 5 mg, 5 mg, Oral, Daily    losartan (Cozaar) tab 50 mg, 50 mg, Oral, Daily    hydroxychloroquine (Plaquenil) tab 300 mg, 300 mg, Oral, Daily    oxyCODONE immediate release tab 5 mg, 5 mg, Oral, Q6H PRN    insulin degludec (Tresiba) 100 units/mL flextouch 10 Units, 10 Units, Subcutaneous, Nightly    Functional Status History:  Falls: no  ADLs: Prudencio stated she was up ambulating at home, lives with her dtr, 14 stairs in the home, she is cognitively intact.     Palliative Performance Scale:   Prior to admission: (pt/family reported) 60 %  Observed during hospitalization: 50 %  % Ambulation Activity Level Self-Care Intake Consciousness   100 Full  Normal  No Disease Full Normal Full   90 Full  Normal  Some Disease Full Normal Full   80 Full  Normal w/effort  Some Disease Full Normal or reduced Full   70 Reduced  Can't Perform Job  Some Disease Full Normal or reduced Full   60 Reduced  Can't Perform Hobby   Significant Disease Occ Assist Normal or reduced Full or confused   50 Mainly sit/lie Can't do any work  Extensive Disease Partial Assist Normal or reduced Full or confused   40 Mainly in bed Can't do any work  Extensive Disease Mainly Assist Normal or reduced Full or confused   30 Bed Bound Can't do any work  Extensive Disease Max Assist  Total Care Reduced  Drowsy/confused   20 Bed Bound Can't do any work  Extensive Disease Max Assist  Total Care Minimal  Drowsy/confused   10 Bed Bound Can't do any work  Extensive Disease Max Assist  Total Care Mouth Care  Drowsy/confused   0 Death        Objective      Vital Signs:  Blood pressure 117/47, pulse 79, temperature 97.8 °F (36.6 °C), temperature source Oral, resp. rate 17, height 5' 1\" (1.549 m), weight 129 lb 12.8 oz (58.9 kg), SpO2 99%.  Body mass index is  24.53 kg/m².  Present Level of pain: denies pain  Non-verbal signs of pain present: No    General: Prudencio is sitting up in the chair she is awake and alert, + for nausea/dry heaving on exam. She appears elderly and frail but in no apparent distress.  HEENT: moist oral MM  Cardiac: Regular rate and rhythm, S1, S2 normal, no murmur, rub or gallop.  Lungs: diminished without wheezes, rales, rhonchi or dullness.  Normal excursions and effort. On 2 liters NC  Abdomen: Soft, non-tender, + bowel sounds, no rebound or guarding + Bm's  Extremities: Without clubbing, cyanosis. BLE Edema not present  Neurologic: Alert and oriented X3, very pleasant moves all extremities. Pleasant affect.   Skin: Warm and dry.    Hematology:  Lab Results   Component Value Date    WBC 8.7 02/04/2025    HGB 7.3 (L) 02/04/2025    HCT 23.5 (L) 02/04/2025    .0 02/04/2025       Coags:  Lab Results   Component Value Date    INR 1.04 01/25/2025    PTT 51.3 (H) 01/30/2025       Chemistry:  Lab Results   Component Value Date    CREATSERUM 1.26 (H) 02/04/2025    BUN 30 (H) 02/04/2025     02/04/2025    K 3.9 02/04/2025     02/04/2025    CO2 25.0 02/04/2025     (H) 02/04/2025    CA 8.4 (L) 02/04/2025    ALB 3.2 01/31/2025    ALKPHO 71 01/31/2025    BILT 0.5 01/31/2025    TP 6.2 01/31/2025    AST 19 01/31/2025    ALT 7 (L) 01/31/2025    DDIMER 2.77 (H) 01/25/2025    MG 1.4 (L) 02/04/2025    PHOS 4.0 01/22/2025       Imaging:  XR CHEST AP PORTABLE  (CPT=71045)    Result Date: 2/3/2025  CONCLUSION:  1. Cardiomegaly.  Tortuous thoracic aorta. 2. Emphysematous changes and chronic pulmonary fibrotic changes. 3. Slight improved aeration since 2/2/2025 may reflect mild improving superimposed alveolitis.     Dictated by (CST): Louis Moreno MD on 2/03/2025 at 7:53 AM     Finalized by (CST): Louis Moreno MD on 2/03/2025 at 7:56 AM         CARD ECHO 2D DOPPLER CONTRAST (CPT=93306)    Result Date: 1/18/2025  Transthoracic  Echocardiogram Name:Chester Bautista Date: 2025 :  1940 Ht:  (61in)  BP: 178 / 84 MRN:  [id number]    Age:  84years    Wt:  (137lb) HR: Loc:  [facility]       Gndr: F          BSA: 1.61m^2 Sonographer: Nina MELENDREZ Ordering:    Lorena Walton Consulting:  Kai Flanagan ---------------------------------------------------------------------------- History/Indications:   Cerebrovascular accident.  Pulmonary emboli. ---------------------------------------------------------------------------- Procedure information:  A transthoracic complete 2D study was performed. Additional evaluation included M-mode, complete spectral Doppler, and color Doppler.  Patient status:  Inpatient.  Location:  Bedside.    Comparison was made to the study of 2024.    This was a STAT study. Transthoracic echocardiography for diagnosis and ventricular function evaluation. Image quality was adequate. Intravenous contrast (Definity) was administered to opacify the LV, LA, and RA. ECG rhythm:   Normal sinus ---------------------------------------------------------------------------- Conclusions: 1. Left ventricle: The cavity size was mildly reduced. Wall thickness was    normal. Systolic function was normal. The estimated ejection fraction was    55%, by visual assessment. No diagnostic evidence for regional wall    motion abnormalities. Features are consistent with a pseudonormal left    ventricular filling pattern, with concomitant abnormal relaxation and    increased filling pressure - grade 2 diastolic dysfunction. 2. Left ventricle: There is mild hypokinesis of the mid anterolateral wall. 3. Right ventricle: The cavity size was normal. Systolic function was    normal. 4. Ventricular septum: Thickness was moderately increased. 5. Left atrium: The atrium was markedly dilated. 6. Aortic valve: There was mild to moderate regurgitation. 7. Mitral valve: There was mild regurgitation. 8. Pulmonic valve: There was moderate  regurgitation. 9. Pulmonary arteries: Systolic pressure was mildly increased. Impressions:  No significant change since prior study. * ---------------------------------------------------------------------------- * Findings: Left ventricle:  The cavity size was mildly reduced. Wall thickness was normal. Systolic function was normal. The estimated ejection fraction was 55%, by visual assessment. No diagnostic evidence for diffuse regional wall motion abnormalities. No diagnostic evidence for regional wall motion abnormalities. Regional wall motion:   There is mild hypokinesis of the mid anterolateral wall.   Features are consistent with a pseudonormal left ventricular filling pattern, with concomitant abnormal relaxation and increased filling pressure - grade 2 diastolic dysfunction. Ventricular septum:   Thickness was moderately increased. Left atrium:  The atrium was markedly dilated. Right ventricle:  The cavity size was normal. Systolic function was normal. Right atrium:  The atrium was normal in size. Mitral valve:  The valve was structurally normal. Leaflet separation was normal.  Doppler:  Transvalvular velocity was within the normal range. There was no evidence for stenosis. There was mild regurgitation. Aortic valve:  The valve was structurally normal. The valve was trileaflet. The leaflets were normal thickness. Cusp separation was normal.  Doppler: Transvalvular velocity was within the normal range. There was no evidence for stenosis. There was mild to moderate regurgitation. Tricuspid valve:  The valve is structurally normal. Leaflet separation was normal.  Doppler:  Transvalvular velocity was within the normal range. There was no evidence for stenosis. There was mild regurgitation. Pulmonic valve:   The valve is structurally normal. Cusp separation was normal.  Doppler:  Transvalvular velocity was within the normal range. There was no evidence for stenosis. There was moderate regurgitation. The peak  systolic gradient was 17mm Hg. Pericardium:   There was no pericardial effusion. Aorta: Aortic root: The aortic root was normal-sized. Ascending aorta: The ascending aorta was normal. Pulmonary arteries: The main pulmonary artery was normal-sized. Systolic pressure was mildly increased. Systemic veins: Inferior vena cava: The IVC was normally collapsible and normal-sized. ---------------------------------------------------------------------------- Measurements  Left ventricle         Value        Ref       02/03/2024  IVS thickness, ED, (H) 1.3   cm     0.6 - 0.9 1.1  PLAX  LV ID, ED, PLAX    (L) 3.6   cm     3.8 - 5.2 3.7  LV ID, ES, PLAX        2.7   cm     2.2 - 3.5 2.7  LV PW thickness,       0.9   cm     0.6 - 0.9 0.9  ED, PLAX  IVS/LV PW ratio,       1.44         --------- 1.22  ED, PLAX  LV PW/LV ID ratio,     0.25         --------- 0.24  ED, PLAX  LV ejection        (L) 50    %      54 - 74   54  fraction  Stroke volume/bsa,     27    ml/m^2 --------- 34  2D  LV end-diastolic       64    ml     48 - 140  ----------  volume, 1-p A4C  LV ejection            52    %      46 - 78   ----------  fraction, 1-p A4C  Stroke volume, 1-p     33    ml     --------- ----------  A4C  LV end-diastolic       40    ml/m^2 30 - 82   ----------  volume/bsa, 1-p  A4C  Stroke volume/bsa,     21    ml/m^2 --------- ----------  1-p A4C  LV end-diastolic       63    ml     46 - 106  ----------  volume, 2-p  LV end-systolic        34    ml     14 - 42   ----------  volume, 2-p  LV ejection        (L) 46    %      54 - 74   ----------  fraction, 2-p  Stroke volume, 2-p     29    ml     --------- ----------  LV end-diastolic       39    ml/m^2 29 - 61   ----------  volume/bsa, 2-p  LV end-systolic        21    ml/m^2 8 - 24    ----------  volume/bsa, 2-p  Stroke volume/bsa,     18    ml/m^2 --------- ----------  2-p  LV e', lateral     (L) 5.9   cm/sec >=10.0    6.5  LV E/e', lateral   (H) 14           <=13      19  LV e', medial       (L) 2.8   cm/sec >=7.0     6.4  LV E/e', medial        29           --------- 20  LV e', average         4.4   cm/sec --------- 6.5  LV E/e', average   (H) 19           <=14      19  LVOT                   Value        Ref       02/03/2024  LVOT ID                1.8   cm     --------- 1.9  LVOT peak              0.84  m/sec  --------- 1  velocity, S  LVOT VTI, S            19.5  cm     --------- 19.2  LVOT peak              3     mm Hg  --------- 4  gradient, S  LVOT mean              2     mm Hg  --------- 2  gradient, S  Stroke volume          50    ml     --------- 54  (SV), LVOT DP  Stroke index           31    ml/m^2 --------- 34  (SV/bsa), LVOT DP  Aortic valve           Value        Ref       02/03/2024  Aortic pressure        360   ms     --------- ----------  half-time  Aortic regurg          4.66  m/sec  --------- ----------  velocity, ED  Aortic regurg          496   cm/s^2 --------- ----------  deceleration  Aortic regurg          275   ms     --------- ----------  pressure half-time  Aortic regurg          87    mm Hg  --------- ----------  gradient, ED  Aortic root            Value        Ref       02/03/2024  Aortic root ID         3.0   cm     2.4 - 3.8 3.4  Aortic root ID,        3.2   cm     2.0 - 3.2 ----------  STJ, ED  Aortic root ID,        2.9   cm     --------- ----------  ED, MM  Ascending aorta        Value        Ref       02/03/2024  Ascending aorta ID (H) 3.6   cm     1.9 - 3.5 3.3  Left atrium            Value        Ref       02/03/2024  LA ID, A-P, ES     (H) 4.8   cm     2.7 - 3.8 ----------  LA volume, S       (H) 84    ml     22 - 52   52  LA volume/bsa, S   (H) 52    ml/m^2 16 - 34   32  LA volume, ES, 1-p (H) 86    ml     22 - 52   53  A4C  LA volume, ES, 1-p (H) 80    ml     22 - 52   49  A2C  LA volume, ES, A/L     88    ml     --------- 55  LA volume/bsa, ES, (H) 55    ml/m^2 16 - 34   34  A/L  LA/aortic root         1.6          --------- ----------  ratio  Mitral valve            Value        Ref       02/03/2024  Mitral E-wave peak     0.83  m/sec  --------- 1.26  velocity  Mitral A-wave peak     1.54  m/sec  --------- 1.43  velocity  Mitral                 225   ms     --------- 236  deceleration time  Mitral peak            3     mm Hg  --------- 6  gradient, D  Mitral E/A ratio,      0.5          --------- 0.9  peak  Mitral regurg peak     488   cm/sec --------- ----------  velocity  Mitral peak LV-LA      95.26 mm Hg  --------- ----------  gradient, S  Mitral maximal         486   cm/sec --------- ----------  regurg velocity,  PISA  Pulmonary artery       Value        Ref       02/03/2024  PA pressure, S, DP     25    mm Hg  --------- ----------  PA pressure, ED,       14    mm Hg  --------- ----------  DP  Tricuspid valve        Value        Ref       02/03/2024  Tricuspid regurg   (H) 3.14  m/sec  <=2.8     2.71  peak velocity  Tricuspid peak         39    mm Hg  --------- 29  RV-RA gradient  Systemic veins         Value        Ref       02/03/2024  Estimated CVP          3     mm Hg  --------- 3  Inferior vena cava     Value        Ref       02/03/2024  ID                     1.6   cm     <=2.1     1.4  Right ventricle        Value        Ref       02/03/2024  TAPSE, MM          (L) 1.07  cm     >=1.70    1.41  RV pressure, S, DP     42    mm Hg  --------- 32  RV s', lateral         11.1  cm/sec >=9.5     16.6  Pulmonic valve         Value        Ref       02/03/2024  Pulmonic valve         2.1   m/sec  --------- ----------  peak velocity, S  Pulmonic peak          17    mm Hg  --------- ----------  gradient, S  Pulmonic regurg        2.11  m/sec  --------- ----------  peak velocity  Pulmonic regurg        18    mm Hg  --------- ----------  peak gradient  Pulmonic regurg        1.68  m/sec  --------- ----------  velocity, ED  Pulmonic regurg        11    mm Hg  --------- ----------  gradient, ED Legend: (L)  and  (H)  lex values outside specified reference range.  ---------------------------------------------------------------------------- Prepared and electronically signed by Wlaker Montero MD 01/18/2025 09:24          Summary of Discussion      I discussed reason for palliative care consultation with Prudencio and her dtr Tommy over the speaker phone in pts room.    I differentiated the palliative treatment-focus model versus the hospice comfort-focused philosophy of care. I informed the patient/family that having palliative care support does not limit medical treatment options or decisions to those who wish to continue curative or restorative medical therapies. I discussed the benefits of palliative care to include assistance with arising symptom management needs, an extra layer of support, to ensure GOC are respected throughout healthcare continuum, and assist with transition to hospice care when appropriate.      Outpatient/Community Palliative Care Services:  Usually visit once per 4 weeks  Focus on GOC and symptom management   Palliative Care criteria:  Not altered by prognosis   Does not limit curative or restorative therapies      Outpatient Hospice services:  24/7 phone triage services   RN visit one or more times per week depending on need  Home health aid to assist in ADLs/hygiene   Hospice criteria:  Less than six-month prognosis   Must forego most life-prolonging  measures/treatments   Focus solely on comfort   Must sign onto hospice benefit with agency     Prognostic awareness/understanding:   Prudencio expressed she is not sure how long she will want to go through with frequent blood transfusions, due to her GI AVMs, Prudencio stated with her advanced age she does not want aggressive medical interventions and procedures and expressed when she comes to EOL she does not want aggressive medical measures to keep her alive.  Prudencio stated she does not want chest compressions, no breathing tube or CPR when death comes she would like to die naturally and peacefully. We  discussed Prudencio's wishes with her dtr Tommy over the speaker phone. Tommy stated she was aware of her mothers wishes of no chest compressions/CPR. I discussed Prudencio is Full Code in epic at this time and asked for permission to change code status in epic to DNAR/DNI with selective treatment. Tommy expressed she wanted to speak with the other sister and pts grandtr before changing code status in epic.   Discussed meeting tomorrow at 10am with pt and her dtr, will plan to make final decision on code status and fill out HCPOA pw.   Hopes/goals:   Prudencio expressed she is agreeable to [facility] with the ultimate goal of returning to home.     Fears/concerns:   Prudencio expressed concerns over potential need for frequent blood transfusions and if she is up to it long term, she stated she will try it and see how she does.     Advance Care Planning counseling and discussion:   I discussed the importance of advance care planning prior to crisis with Prudencio.   Prudencio stated she has never filled out HCPOA pw in the past, she is interested in filling out HCPOA pw while here.   I provided blank POLST form and HCPOA pw for review and will meet with pt and family tomorrow to review and fill out.   Prudencio expressed wishes to be DNAR/DNI with selective treatment. Dtr is aware of her mothers wishes and will speak with the other family members.    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 Would recommend CPC if available at the acute [facility]. SW to help with dc planning.   - following  -ongoing GOC discussions will be needed over time.  -pt is not sure how long she will want to go through frequent blood transfusions.   -pt expressed she does not want chest compressions, shocks or breathing tube-discussed with her dtr over the phone, Dtr Tommy wants to speak with other family members in regards to her mothers wishes.   -will meet with family and pt tomorrow @ 10am for further GOC discussion.   -Provided emotional support to pt/family who are coping adequately    Advance care planning  -see above for details  -Pt's dtr Tommy Leyva is HC surrogate [phone]    Palliative Performance Scale 50%    Discussed today's visit with Dr Santos and Olayinka SOW    Palliative Care Follow Up: Palliative care team will follow up tomorrow.  Feel free to contact our team with any questions or concerns.    Thank you for allowing Palliative Care services to participate in the care of Chester Bautista.    A total of 75 minutes were spent on this consult, which included all of the following: chart review, direct face to face contact, history taking, physical examination, counseling and coordinating care, and documentation.     Arleth Bhatti, APRN [id number]  2/4/2025  1:56PM  Palliative Care Services         [1]   Allergies  Allergen Reactions    Celebrex [Celecoxib] PALPITATIONS    Penicillins ITCHING and SWELLING    Amlodipine OTHER (SEE COMMENTS)     Calves Swelling     Metformin And Related UNKNOWN    Olmesartan UNKNOWN

## 2025-02-04 NOTE — PLAN OF CARE
Problem: Diabetes/Glucose Control  Goal: Glucose maintained within prescribed range  Description: INTERVENTIONS:  - Monitor Blood Glucose as ordered  - Assess for signs and symptoms of hyperglycemia and hypoglycemia  - Administer ordered medications to maintain glucose within target range  - Assess barriers to adequate nutritional intake and initiate nutrition consult as needed  - Instruct patient on self management of diabetes  Outcome: Progressing     Problem: METABOLIC/FLUID AND ELECTROLYTES - ADULT  Goal: Glucose maintained within prescribed range  Description: INTERVENTIONS:  - Monitor Blood Glucose as ordered  - Assess for signs and symptoms of hyperglycemia and hypoglycemia  - Administer ordered medications to maintain glucose within target range  - Assess barriers to adequate nutritional intake and initiate nutrition consult as needed  - Instruct patient on self management of diabetes  Outcome: Progressing  Goal: Electrolytes maintained within normal limits  Description: INTERVENTIONS:  - Monitor labs and rhythm and assess patient for signs and symptoms of electrolyte imbalances  - Administer electrolyte replacement as ordered  - Monitor response to electrolyte replacements, including rhythm and repeat lab results as appropriate  - Fluid restriction as ordered  - Instruct patient on fluid and nutrition restrictions as appropriate  Outcome: Progressing  Goal: Hemodynamic stability and optimal renal function maintained  Description: INTERVENTIONS:  - Monitor labs and assess for signs and symptoms of volume excess or deficit  - Monitor intake, output and patient weight  - Monitor urine specific gravity, serum osmolarity and serum sodium as indicated or ordered  - Monitor response to interventions for patient's volume status, including labs, urine output, blood pressure (other measures as available)  - Encourage oral intake as appropriate  - Instruct patient on fluid and nutrition restrictions as  appropriate  Outcome: Progressing     Problem: SKIN/TISSUE INTEGRITY - ADULT  Goal: Skin integrity remains intact  Description: INTERVENTIONS  - Assess and document risk factors for pressure ulcer development  - Assess and document skin integrity  - Monitor for areas of redness and/or skin breakdown  - Initiate interventions, skin care algorithm/standards of care as needed  Outcome: Progressing     Problem: MUSCULOSKELETAL - ADULT  Goal: Return mobility to safest level of function  Description: INTERVENTIONS:  - Assess patient stability and activity tolerance for standing, transferring and ambulating w/ or w/o assistive devices  - Assist with transfers and ambulation using safe patient handling equipment as needed  - Ensure adequate protection for wounds/incisions during mobilization  - Obtain PT/OT consults as needed  - Advance activity as appropriate  - Communicate ordered activity level and limitations with patient/family  Outcome: Progressing     Problem: SAFETY ADULT - FALL  Goal: Free from fall injury  Description: INTERVENTIONS:  - Assess pt frequently for physical needs  - Identify cognitive and physical deficits and behaviors that affect risk of falls.  - [place] fall precautions as indicated by assessment.  - Educate pt/family on patient safety including physical limitations  - Instruct pt to call for assistance with activity based on assessment  - Modify environment to reduce risk of injury  - Provide assistive devices as appropriate  - Consider OT/PT consult to assist with strengthening/mobility  - Encourage toileting schedule  Outcome: Progressing     Problem: PAIN - ADULT  Goal: Verbalizes/displays adequate comfort level or patient's stated pain goal  Description: INTERVENTIONS:  - Encourage pt to monitor pain and request assistance  - Assess pain using appropriate pain scale  - Administer analgesics based on type and severity of pain and evaluate response  - Implement non-pharmacological measures as  appropriate and evaluate response  - Consider cultural and social influences on pain and pain management  - Manage/alleviate anxiety  - Utilize distraction and/or relaxation techniques  - Monitor for opioid side effects  - Notify MD/LIP if interventions unsuccessful or patient reports new pain  - Anticipate increased pain with activity and pre-medicate as appropriate  Outcome: Progressing

## 2025-02-04 NOTE — PLAN OF CARE
No acute changes. Fall precautions in place. Call light In reach.     Problem: HEMATOLOGIC - ADULT  Goal: Free from bleeding injury  Description: (Example usage: patient with low platelets)  INTERVENTIONS:  - Avoid intramuscular injections, enemas and rectal medication administration  - Ensure safe mobilization of patient  - Hold pressure on venipuncture sites to achieve adequate hemostasis  - Assess for signs and symptoms of internal bleeding  - Monitor lab trends  - Patient is to report abnormal signs of bleeding to staff  - Avoid use of toothpicks and dental floss  - Use electric shaver for shaving  - Use soft bristle tooth brush  - Limit straining and forceful nose blowing  Outcome: Progressing     Problem: Patient Centered Care  Goal: Patient preferences are identified and integrated in the patient's plan of care  Description: Interventions:  - What would you like us to know as we care for you? From home with daughter   - Provide timely, complete, and accurate information to patient/family  - Incorporate patient and family knowledge, values, beliefs, and cultural backgrounds into the planning and delivery of care  - Encourage patient/family to participate in care and decision-making at the level they choose  - Honor patient and family perspectives and choices  Outcome: Progressing     Problem: Diabetes/Glucose Control  Goal: Glucose maintained within prescribed range  Description: INTERVENTIONS:  - Monitor Blood Glucose as ordered  - Assess for signs and symptoms of hyperglycemia and hypoglycemia  - Administer ordered medications to maintain glucose within target range  - Assess barriers to adequate nutritional intake and initiate nutrition consult as needed  - Instruct patient on self management of diabetes  Outcome: Progressing     Problem: Patient/Family Goals  Goal: Patient/Family Long Term Goal  Description: Patient's Long Term Goal: discharge from hospital     Interventions:  - monitor vital signs   -  monitor blood glucose levels  - monitor appropriate labs  - pain management   - administer medications per order  - follow MD orders  - diagnostics per order  - update and inform patient and family on plan of care  - discharge planning  - See additional Care Plan goals for specific interventions  Outcome: Progressing  Goal: Patient/Family Short Term Goal  Description: Patient's Short Term Goal: manage pain     Interventions:   - monitor vital signs   - monitor blood glucose levels  - monitor appropriate labs  - pain management   - administer medications per order  - follow MD orders  - diagnostics per order  - update and inform patient and family on plan of care  - See additional Care Plan goals for specific interventions  Outcome: Progressing     Problem: CARDIOVASCULAR - ADULT  Goal: Maintains optimal cardiac output and hemodynamic stability  Description: INTERVENTIONS:  - Monitor vital signs, rhythm, and trends  - Monitor for bleeding, hypotension and signs of decreased cardiac output  - Evaluate effectiveness of vasoactive medications to optimize hemodynamic stability  - Monitor arterial and/or venous puncture sites for bleeding and/or hematoma  - Assess quality of pulses, skin color and temperature  - Assess for signs of decreased coronary artery perfusion - ex. Angina  - Evaluate fluid balance, assess for edema, trend weights  Outcome: Progressing  Goal: Absence of cardiac arrhythmias or at baseline  Description: INTERVENTIONS:  - Continuous cardiac monitoring, monitor vital signs, obtain 12 lead EKG if indicated  - Evaluate effectiveness of antiarrhythmic and heart rate control medications as ordered  - Initiate emergency measures for life threatening arrhythmias  - Monitor electrolytes and administer replacement therapy as ordered  Outcome: Progressing     Problem: METABOLIC/FLUID AND ELECTROLYTES - ADULT  Goal: Glucose maintained within prescribed range  Description: INTERVENTIONS:  - Monitor Blood Glucose  as ordered  - Assess for signs and symptoms of hyperglycemia and hypoglycemia  - Administer ordered medications to maintain glucose within target range  - Assess barriers to adequate nutritional intake and initiate nutrition consult as needed  - Instruct patient on self management of diabetes  Outcome: Progressing  Goal: Electrolytes maintained within normal limits  Description: INTERVENTIONS:  - Monitor labs and rhythm and assess patient for signs and symptoms of electrolyte imbalances  - Administer electrolyte replacement as ordered  - Monitor response to electrolyte replacements, including rhythm and repeat lab results as appropriate  - Fluid restriction as ordered  - Instruct patient on fluid and nutrition restrictions as appropriate  Outcome: Progressing  Goal: Hemodynamic stability and optimal renal function maintained  Description: INTERVENTIONS:  - Monitor labs and assess for signs and symptoms of volume excess or deficit  - Monitor intake, output and patient weight  - Monitor urine specific gravity, serum osmolarity and serum sodium as indicated or ordered  - Monitor response to interventions for patient's volume status, including labs, urine output, blood pressure (other measures as available)  - Encourage oral intake as appropriate  - Instruct patient on fluid and nutrition restrictions as appropriate  Outcome: Progressing     Problem: NEUROLOGICAL - ADULT  Goal: Achieves stable or improved neurological status  Description: INTERVENTIONS  - Assess for and report changes in neurological status  - Initiate measures to prevent increased intracranial pressure  - Maintain blood pressure and fluid volume within ordered parameters to optimize cerebral perfusion and minimize risk of hemorrhage  - Monitor temperature, glucose, and sodium. Initiate appropriate interventions as ordered  Outcome: Progressing  Goal: Achieves maximal functionality and self care  Description: INTERVENTIONS  - Monitor swallowing and  airway patency with patient fatigue and changes in neurological status  - Encourage and assist patient to increase activity and self care with guidance from PT/OT  - Encourage visually impaired, hearing impaired and aphasic patients to use assistive/communication devices  Outcome: Progressing     Problem: PAIN - ADULT  Goal: Verbalizes/displays adequate comfort level or patient's stated pain goal  Description: INTERVENTIONS:  - Encourage pt to monitor pain and request assistance  - Assess pain using appropriate pain scale  - Administer analgesics based on type and severity of pain and evaluate response  - Implement non-pharmacological measures as appropriate and evaluate response  - Consider cultural and social influences on pain and pain management  - Manage/alleviate anxiety  - Utilize distraction and/or relaxation techniques  - Monitor for opioid side effects  - Notify MD/LIP if interventions unsuccessful or patient reports new pain  - Anticipate increased pain with activity and pre-medicate as appropriate  Outcome: Progressing     Problem: SAFETY ADULT - FALL  Goal: Free from fall injury  Description: INTERVENTIONS:  - Assess pt frequently for physical needs  - Identify cognitive and physical deficits and behaviors that affect risk of falls.  - [place] fall precautions as indicated by assessment.  - Educate pt/family on patient safety including physical limitations  - Instruct pt to call for assistance with activity based on assessment  - Modify environment to reduce risk of injury  - Provide assistive devices as appropriate  - Consider OT/PT consult to assist with strengthening/mobility  - Encourage toileting schedule  Outcome: Progressing     Problem: DISCHARGE PLANNING  Goal: Discharge to home or other facility with appropriate resources  Description: INTERVENTIONS:  - Identify barriers to discharge w/pt and caregiver  - Include patient/family/discharge partner in discharge planning  - Arrange for needed  discharge resources and transportation as appropriate  - Identify discharge learning needs (meds, wound care, etc)  - Arrange for interpreters to assist at discharge as needed  - Consider post-discharge preferences of patient/family/discharge partner  - Complete POLST form as appropriate  - Assess patient's ability to be responsible for managing their own health  - Refer to Case Management Department for coordinating discharge planning if the patient needs post-hospital services based on physician/LIP order or complex needs related to functional status, cognitive ability or social support system  Outcome: Progressing     Problem: SKIN/TISSUE INTEGRITY - ADULT  Goal: Skin integrity remains intact  Description: INTERVENTIONS  - Assess and document risk factors for pressure ulcer development  - Assess and document skin integrity  - Monitor for areas of redness and/or skin breakdown  - Initiate interventions, skin care algorithm/standards of care as needed  Outcome: Progressing     Problem: MUSCULOSKELETAL - ADULT  Goal: Return mobility to safest level of function  Description: INTERVENTIONS:  - Assess patient stability and activity tolerance for standing, transferring and ambulating w/ or w/o assistive devices  - Assist with transfers and ambulation using safe patient handling equipment as needed  - Ensure adequate protection for wounds/incisions during mobilization  - Obtain PT/OT consults as needed  - Advance activity as appropriate  - Communicate ordered activity level and limitations with patient/family  Outcome: Progressing     Problem: Impaired Swallowing  Goal: Minimize aspiration risk  Description: Interventions:  - Patient should be alert and upright for all feedings (90 degrees preferred)  - Offer food and liquids at a slow rate  - No straws  - Encourage small bites of food and small sips of liquid  - Offer pills one at a time, crush or deliver with applesauce as needed  - Discontinue feeding and notify MD  (or speech pathologist) if coughing or persistent throat clearing or wet/gurgly vocal quality is noted  Outcome: Progressing

## 2025-02-04 NOTE — CM/SW NOTE
JENNIFER followed up with Nellie at [facility] and confirmed auth has been approved for 2/3-2/8. Received message from ed Reinoso, questioning if [facility] has a CPC program or agency they utilize. Nellie is unsure and will follow up. Per MD, anticipate patient is ready for discharge tomorrow, Nellie aware.     Plan: [facility] for AIR pending medical clearance    Madelin Parekh RN, BSN

## 2025-02-04 NOTE — PROGRESS NOTES
[facility]  part of [facility]  Hematology  Progress Note    Chester Bautista Patient Status:  Inpatient    1940 MRN [id number]   Location [facility] 2W/SW Attending Casa Santos MD   Hosp Day # 17 PCP Heike Sorto MD     Pt with hx of recurrent GI bleed admitted with ischemic stroke+PE    Subjective:   Subjective:    Pt is noted to have dark stool today. She has no chest pain or dyspnea, reports feeling improved. Pt had some nausea+vomiting recently. No new concerns on ROS.    Objective:   Blood pressure 111/58, pulse 83, temperature 97.1 °F (36.2 °C), temperature source Temporal, resp. rate 24, height 1.549 m (5' 1\"), weight 58.9 kg (129 lb 12.8 oz), SpO2 97%.  Physical Exam  Constitutional:       Appearance: She is ill-appearing. She is not toxic-appearing.   HENT:      Head: Normocephalic.      Nose:      Comments: Supplemental oxygen in place     Mouth/Throat:      Mouth: Mucous membranes are moist.   Eyes:      Extraocular Movements: Extraocular movements intact.   Cardiovascular:      Rate and Rhythm: Normal rate.   Pulmonary:      Effort: Pulmonary effort is normal. No respiratory distress.   Abdominal:      Palpations: Abdomen is soft.      Tenderness: There is no abdominal tenderness.   Musculoskeletal:         General: Normal range of motion.      Cervical back: Normal range of motion.   Skin:     Coloration: Skin is not jaundiced.   Neurological:      General: No focal deficit present.      Mental Status: She is alert and oriented to person, place, and time.   Psychiatric:         Mood and Affect: Mood normal.         Behavior: Behavior normal.         Thought Content: Thought content normal.         Judgment: Judgment normal.         Results:   Lab Results   Component Value Date    WBC 8.7 2025    HGB 7.3 (L) 2025    HCT 23.5 (L) 2025    .0 2025    CREATSERUM 1.26 (H) 2025    BUN 30 (H) 2025      02/04/2025    K 3.9 02/04/2025     02/04/2025    CO2 25.0 02/04/2025     (H) 02/04/2025    CA 8.4 (L) 02/04/2025    ALB 3.2 01/31/2025    ALKPHO 71 01/31/2025    BILT 0.5 01/31/2025    TP 6.2 01/31/2025    AST 19 01/31/2025    ALT 7 (L) 01/31/2025    PTT 51.3 (H) 01/30/2025    INR 1.04 01/25/2025    T4F 1.2 01/17/2025    TSH 0.418 (L) 01/17/2025    DDIMER 2.77 (H) 01/25/2025    ESRML >130 (H) 11/29/2024    CRP 3.00 (H) 11/29/2024    MG 1.4 (L) 02/04/2025    PHOS 4.0 01/22/2025    TROPHS 11 01/17/2025    B12 1,058 (H) 02/04/2025       XR CHEST AP PORTABLE  (CPT=71045)    Result Date: 2/3/2025  CONCLUSION:  1. Cardiomegaly.  Tortuous thoracic aorta. 2. Emphysematous changes and chronic pulmonary fibrotic changes. 3. Slight improved aeration since 2/2/2025 may reflect mild improving superimposed alveolitis.     Dictated by (CST): Louis Moreno MD on 2/03/2025 at 7:53 AM     Finalized by (CST): Louis Moreno MD on 2/03/2025 at 7:56 AM               Assessment & Plan:     84 year old W with chronic iron deficiency anemia in the setting of bleeding AVMs s/p repeat endoscopic treatment, who requires regular IV iron treatments and blood transfusions admitted with dizziness due to ischemic stroke, found to have acute PE, aortic arch thrombus and klebsiella UTI     1.) BUZZ from AVM's     - previously CAPUTO has been a hallmark for symptomatic anemia for her, also has worsening fatigue and some lightheadedness    --pt has improved symptoms with octreotide by Dr. Garcia in GI for continued blood loss  --getting a unit of blood and more IV iron today  --will plan to f/u with pt outpatient s/p discharge to provide more transfusions PRN and iron PRN  --discussions w/ pt about possible transfer to tertiary care for balloon enteroscopy; but  this was not pursued    2.) HTN  --better controlled; currently hospitalized in the ICU        3) History of PE, now with an acute PE  --found on 2/23/24; she was not on  anticoagulation in light of recent GI bleeding   --IVC filter placed on 3/7/24 by IR; she was rec for IVC filter retreival in 3-6 mo by IR, so this was removed  in 6/24  --her PE was noted to be resolved by CT chest on 4/26  --now with acute PE found on admission on 1/17/25; now s/p IVC filter on 1/31/25 due to concern for ongoing GI bleeding     4.) COPD/pulm fibrosis  --followed by Dr. Parker, who has been helping during her hospital course  --currently w/o dyspnea on supplemental oxygen     5.) UTI  --klebsiella on abx per primary service    6.) Left Cerebral infarcts  --on aspirin 81 mg/daily, not Brillinta due to bleeding risks per Neurology  --pt is on statin therapy  --pt is s/p left SCA stent by vascular surgery on 10/24/25    7.)Descending aortic arch thrombus   -Admission CTA chest revealing mural thrombus throughout the thoracic and abdominal aorta, chronic per radiology  -Vascular surgery on consult  -s/p angiogram 01/24/2025  -cont statin for now   -Aspirin daily    High Risk    Thank you for allowing me to take part in the care of this patient. Care discussed with Dr. Santos yesterday. I will plan to see the pt as an outpatient for transfusion needs and IV iron prn. I will sign off now.    Eric Mohamud MD  [facility]  [facility] [place]  [address]  [phone]

## 2025-02-04 NOTE — PLAN OF CARE
16:15- pt stating had pain in left groin where she had prior procedure, stated severe pain. Area assessed, no change to site from previous assessment  (left groin site open to air, slightly bruised, no drainage). Patient agreeable to try prn tylenol. Dr. Santos notified.    17:19- pt states is comfortable, denies pain at this time.

## 2025-02-04 NOTE — PLAN OF CARE
Problem: HEMATOLOGIC - ADULT  Goal: Free from bleeding injury  Description: (Example usage: patient with low platelets)  INTERVENTIONS:  - Avoid intramuscular injections, enemas and rectal medication administration  - Ensure safe mobilization of patient  - Hold pressure on venipuncture sites to achieve adequate hemostasis  - Assess for signs and symptoms of internal bleeding  - Monitor lab trends  - Patient is to report abnormal signs of bleeding to staff  - Avoid use of toothpicks and dental floss  - Use electric shaver for shaving  - Use soft bristle tooth brush  - Limit straining and forceful nose blowing  Outcome: Progressing     Problem: Patient Centered Care  Goal: Patient preferences are identified and integrated in the patient's plan of care  Description: Interventions:  - What would you like us to know as we care for you? From home with daughter   - Provide timely, complete, and accurate information to patient/family  - Incorporate patient and family knowledge, values, beliefs, and cultural backgrounds into the planning and delivery of care  - Encourage patient/family to participate in care and decision-making at the level they choose  - Honor patient and family perspectives and choices  Outcome: Progressing     Problem: Diabetes/Glucose Control  Goal: Glucose maintained within prescribed range  Description: INTERVENTIONS:  - Monitor Blood Glucose as ordered  - Assess for signs and symptoms of hyperglycemia and hypoglycemia  - Administer ordered medications to maintain glucose within target range  - Assess barriers to adequate nutritional intake and initiate nutrition consult as needed  - Instruct patient on self management of diabetes  Outcome: Progressing     Problem: Patient/Family Goals  Goal: Patient/Family Long Term Goal  Description: Patient's Long Term Goal: discharge from hospital     Interventions:  - monitor vital signs   - monitor blood glucose levels  - monitor appropriate labs  - pain  management   - administer medications per order  - follow MD orders  - diagnostics per order  - update and inform patient and family on plan of care  - discharge planning  - See additional Care Plan goals for specific interventions  Outcome: Progressing  Goal: Patient/Family Short Term Goal  Description: Patient's Short Term Goal: manage pain     Interventions:   - monitor vital signs   - monitor blood glucose levels  - monitor appropriate labs  - pain management   - administer medications per order  - follow MD orders  - diagnostics per order  - update and inform patient and family on plan of care  - See additional Care Plan goals for specific interventions  Outcome: Progressing     Problem: CARDIOVASCULAR - ADULT  Goal: Maintains optimal cardiac output and hemodynamic stability  Description: INTERVENTIONS:  - Monitor vital signs, rhythm, and trends  - Monitor for bleeding, hypotension and signs of decreased cardiac output  - Evaluate effectiveness of vasoactive medications to optimize hemodynamic stability  - Monitor arterial and/or venous puncture sites for bleeding and/or hematoma  - Assess quality of pulses, skin color and temperature  - Assess for signs of decreased coronary artery perfusion - ex. Angina  - Evaluate fluid balance, assess for edema, trend weights  Outcome: Progressing  Goal: Absence of cardiac arrhythmias or at baseline  Description: INTERVENTIONS:  - Continuous cardiac monitoring, monitor vital signs, obtain 12 lead EKG if indicated  - Evaluate effectiveness of antiarrhythmic and heart rate control medications as ordered  - Initiate emergency measures for life threatening arrhythmias  - Monitor electrolytes and administer replacement therapy as ordered  Outcome: Progressing     Problem: METABOLIC/FLUID AND ELECTROLYTES - ADULT  Goal: Glucose maintained within prescribed range  Description: INTERVENTIONS:  - Monitor Blood Glucose as ordered  - Assess for signs and symptoms of hyperglycemia and  hypoglycemia  - Administer ordered medications to maintain glucose within target range  - Assess barriers to adequate nutritional intake and initiate nutrition consult as needed  - Instruct patient on self management of diabetes  Outcome: Progressing  Goal: Electrolytes maintained within normal limits  Description: INTERVENTIONS:  - Monitor labs and rhythm and assess patient for signs and symptoms of electrolyte imbalances  - Administer electrolyte replacement as ordered  - Monitor response to electrolyte replacements, including rhythm and repeat lab results as appropriate  - Fluid restriction as ordered  - Instruct patient on fluid and nutrition restrictions as appropriate  Outcome: Progressing  Goal: Hemodynamic stability and optimal renal function maintained  Description: INTERVENTIONS:  - Monitor labs and assess for signs and symptoms of volume excess or deficit  - Monitor intake, output and patient weight  - Monitor urine specific gravity, serum osmolarity and serum sodium as indicated or ordered  - Monitor response to interventions for patient's volume status, including labs, urine output, blood pressure (other measures as available)  - Encourage oral intake as appropriate  - Instruct patient on fluid and nutrition restrictions as appropriate  Outcome: Progressing     Problem: NEUROLOGICAL - ADULT  Goal: Achieves stable or improved neurological status  Description: INTERVENTIONS  - Assess for and report changes in neurological status  - Initiate measures to prevent increased intracranial pressure  - Maintain blood pressure and fluid volume within ordered parameters to optimize cerebral perfusion and minimize risk of hemorrhage  - Monitor temperature, glucose, and sodium. Initiate appropriate interventions as ordered  Outcome: Progressing  Goal: Achieves maximal functionality and self care  Description: INTERVENTIONS  - Monitor swallowing and airway patency with patient fatigue and changes in neurological  status  - Encourage and assist patient to increase activity and self care with guidance from PT/OT  - Encourage visually impaired, hearing impaired and aphasic patients to use assistive/communication devices  Outcome: Progressing     Problem: PAIN - ADULT  Goal: Verbalizes/displays adequate comfort level or patient's stated pain goal  Description: INTERVENTIONS:  - Encourage pt to monitor pain and request assistance  - Assess pain using appropriate pain scale  - Administer analgesics based on type and severity of pain and evaluate response  - Implement non-pharmacological measures as appropriate and evaluate response  - Consider cultural and social influences on pain and pain management  - Manage/alleviate anxiety  - Utilize distraction and/or relaxation techniques  - Monitor for opioid side effects  - Notify MD/LIP if interventions unsuccessful or patient reports new pain  - Anticipate increased pain with activity and pre-medicate as appropriate  Outcome: Progressing     Problem: SAFETY ADULT - FALL  Goal: Free from fall injury  Description: INTERVENTIONS:  - Assess pt frequently for physical needs  - Identify cognitive and physical deficits and behaviors that affect risk of falls.  - [place] fall precautions as indicated by assessment.  - Educate pt/family on patient safety including physical limitations  - Instruct pt to call for assistance with activity based on assessment  - Modify environment to reduce risk of injury  - Provide assistive devices as appropriate  - Consider OT/PT consult to assist with strengthening/mobility  - Encourage toileting schedule  Outcome: Progressing     Problem: DISCHARGE PLANNING  Goal: Discharge to home or other facility with appropriate resources  Description: INTERVENTIONS:  - Identify barriers to discharge w/pt and caregiver  - Include patient/family/discharge partner in discharge planning  - Arrange for needed discharge resources and transportation as appropriate  - Identify  discharge learning needs (meds, wound care, etc)  - Arrange for interpreters to assist at discharge as needed  - Consider post-discharge preferences of patient/family/discharge partner  - Complete POLST form as appropriate  - Assess patient's ability to be responsible for managing their own health  - Refer to Case Management Department for coordinating discharge planning if the patient needs post-hospital services based on physician/LIP order or complex needs related to functional status, cognitive ability or social support system  Outcome: Progressing     Problem: SKIN/TISSUE INTEGRITY - ADULT  Goal: Skin integrity remains intact  Description: INTERVENTIONS  - Assess and document risk factors for pressure ulcer development  - Assess and document skin integrity  - Monitor for areas of redness and/or skin breakdown  - Initiate interventions, skin care algorithm/standards of care as needed  Outcome: Progressing     Problem: MUSCULOSKELETAL - ADULT  Goal: Return mobility to safest level of function  Description: INTERVENTIONS:  - Assess patient stability and activity tolerance for standing, transferring and ambulating w/ or w/o assistive devices  - Assist with transfers and ambulation using safe patient handling equipment as needed  - Ensure adequate protection for wounds/incisions during mobilization  - Obtain PT/OT consults as needed  - Advance activity as appropriate  - Communicate ordered activity level and limitations with patient/family  Outcome: Progressing     Problem: Impaired Swallowing  Goal: Minimize aspiration risk  Description: Interventions:  - Patient should be alert and upright for all feedings (90 degrees preferred)  - Offer food and liquids at a slow rate  - No straws  - Encourage small bites of food and small sips of liquid  - Offer pills one at a time, crush or deliver with applesauce as needed  - Discontinue feeding and notify MD (or speech pathologist) if coughing or persistent throat clearing or  wet/gurgly vocal quality is noted  Outcome: Progressing

## 2025-02-04 NOTE — PROGRESS NOTES
Progress Note     Chester Bautista Patient Status:  Inpatient    1940 MRN [id number]   Location [facility] 2W/SW Attending Sonya Lozano MD   Hosp Day # 17 PCP Heike Sorto MD     Chief Complaint: Dizziness    Subjective:   S: Patient is doing fine, no issues reported per RN. She is awake, alert, following commands  Feels well  Denies cp, sob, dizziness  Discussed plans on dc for rehab    Review of Systems:   10 point ROS completed and was negative, except for pertinent positive and negatives stated in subjective.    Objective:   Vital signs:  Temp:  [96.9 °F (36.1 °C)-97.8 °F (36.6 °C)] 96.9 °F (36.1 °C)  Pulse:  [79-85] 83  Resp:  [14-26] 20  BP: ()/(47-65) 93/59  SpO2:  [89 %-99 %] 99 %    Wt Readings from Last 6 Encounters:   25 129 lb 12.8 oz (58.9 kg)   25 147 lb 14.9 oz (67.1 kg)   25 143 lb (64.9 kg)   25 139 lb (63 kg)   24 146 lb (66.2 kg)   24 144 lb 12.8 oz (65.7 kg)         Physical Exam:    General: No acute distress. Alert ,   central venous catheter in place     Respiratory: Clear to auscultation bilaterally. No wheezes. No rhonchi.  Cardiovascular: S1, S2. Regular rate and rhythm. No murmurs, rubs or gallops.   Abdomen: Soft, nontender, nondistended.  Positive bowel sounds. No rebound or guarding.  Neurologic: No focal neurological deficits.   Musculoskeletal: Moves all extremities.  Extremities: No edema.    Results:   Diagnostic Data:      Labs:    Labs Last 24 Hours:   BMP     CBC    Other     Na 136 Cl 106 BUN 30 Glu 158   Hb 7.3   PTT - Procal -   K 3.9 CO2 25.0 Cr 1.26   WBC 8.7 >< .0  INR - CRP -   Renal Lytes Endo    Hct 23.5   Trop - D dim -   eGFR - Ca 8.4 POC Gluc  165    LFT   pBNP - Lactic -   eGFR AA - PO4 - A1c -   AST - APk - Prot -  LDL -     Mg 1.4 TSH -   ALT - T russell - Alb -        COVID-19 Lab Results    COVID-19  Lab Results   Component Value Date    COVID19 Not Detected 2025    COVID19 Not  Detected 02/02/2024    COVID19 Not Detected 12/07/2023       Pro-Calcitonin  No results for input(s): \"PCT\" in the last 168 hours.    Cardiac  No results for input(s): \"TROP\", \"PBNP\" in the last 168 hours.    Creatinine Kinase  No results for input(s): \"CK\" in the last 168 hours.    Inflammatory Markers  No results for input(s): \"CRP\", \"ROSALIE\", \"LDH\", \"DDIMER\" in the last 168 hours.      Imaging: Imaging data reviewed in Epic.    Medications:    aspirin  81 mg Oral Daily    insulin aspart  1-7 Units Subcutaneous TID CC    sodium ferric gluconate  125 mg Intravenous Daily    pantoprazole  40 mg Oral BID AC    melatonin  3 mg Oral Nightly    carvedilol  25 mg Oral BID with meals    lidocaine-menthol  1 patch Transdermal Daily    octreoTIDe  50 mcg Subcutaneous Q8H    rosuvastatin  10 mg Oral Nightly    sennosides  8.6 mg Oral BID    cetirizine  5 mg Oral Daily    docusate sodium  100 mg Oral BID    fluticasone-salmeterol  1 puff Inhalation 2 times per day    gabapentin  100 mg Oral Nightly    montelukast  10 mg Oral Nightly    predniSONE  5 mg Oral Daily    losartan  50 mg Oral Daily    hydroxychloroquine  300 mg Oral Daily    insulin degludec  10 Units Subcutaneous Nightly       Assessment & Plan:   ASSESSMENT / PLAN:       #Anemia  -in the setting of PE and aortic arch thrombus, the decision was made to resume anticoagulation despite the patients history of bleeding AVMs  -monitor H&H, transfuse as indicated  -GI on consult                Start octreotide 50 mcg TID  Continue Brilinta as the benefit of AC are >> risks of bleeding  Possible endoscopic eval vs transfer to a tertiary care center for balloon enteroscopy and colonoscopy  Currently on a heparin drip  Monitor H&H  -transfused pRBC (total 2U pRBC and 1U platelets since admission)  -d/w patient and GI  -Would not want endoscopic evaluations given the rapidity that her AVMs recur seen by hematology appreciate their evaluation  -Plan close outpatient follow-up  and serial transfusions at this juncture.  -d/w neurology and vascular surgery  -plan asa 81 mg daily, will not plan brillinta, due to risk, d/w patient       #Left Cerebellar Infarcts  #SAH  - pt with new sudden onset neuro changes  - 2 small cerebellar infarcts seen on admission MRI  -Patient already took 3 baby aspirin's prior to coming to ED   - CTA negative for LVO, but showing small subsegmental Pes  - Stroke alert called 1/19 -> stroke CT non-acute, MRI showing small CVA in L thalamus   - Neuro on consult  -Continue statin  -Brillinta resumed 01/26/2025  -high risk morbidity/mortality given her history of GI bleed and now need for antiplatelet therapy and potential anticoagulation in the future. Family is aware  -gradually lower BP  -Vascular surgery on consult  -s/p stent placement 10/24/2025  -off brillinta-->d/w vascular surgery ok with asa 81mg  -CT head reviewed, repeat CT pending  -Appreciate further neuro and vascular surgery input  -continue present management  -d/w neuro and vascular surgery, plan asa 81 mg daily     #Dizziness  Improved   -Likely in setting of above  -Meclizine prn   -Neurology     #PE   -Patient is hemodynamically stable, EKG NSR and troponin not elevated.  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 Already took 3 baby aspirins prior to admission  -Previously had IVC filter placed in March 2024 for PE at that time (removed June 2024).   -Needs eval for hypercoagulable state. Risk factors of recent car ride, overall sedentary state, breast cancer. 1/31: heparin is dc. Pulmonary following   2/1 : s/p IVC filter on 1/31      #Descending aortic arch thrombus   -Admission CTA chest revealing mural thrombus throughout the thoracic and abdominal aorta, chronic per radiology  -Vascular surgery on consult  -s/p angiogram 01/24/2025  -cont statin for now   -Aspirin daily     #HTN  - decrease antihypertensives as bp on lower  side  -home meds diovan 40mg bid and carvedilol 12.5 bid  -plan losartan 25mg daily and carvdilol 12.5 bid       #Klebsiella UTI  -nitrofurantoin initiated in ED, UCx reviewed - nitrofurantoin resistant  -started on IV Cefepime - completed course of treatment     #Constipation  -bowel regimen ordered      Chronic Medical Problems  HTN  HLD  DM  COPD/?sarcoidosis   Rheumatoid Arthritis  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   Estimated date of discharge: To be determined  Discharge is dependent on: Improved clinical status  At this point Patient is expected to be discharge to: acute rehab      Plan of care discussed with Patient and RN.     Coordinated care with providers and counseling re: treatment plan and workup     VIDYA CORDOBA MD      Supplementary Documentation:       I personally reviewed the available laboratories, imaging including operative report. I discussed/will discuss the case with patient and her nurse. I ordered laboratories studies. I adjusted medications including not applicable today. Medical decision making high, risk is high.     >55min spent, >50% spent counseling and coordinating care in the form of educating pt/family and d/w consultants and staff. Most of the time spent discussing the above plan.

## 2025-02-04 NOTE — PROGRESS NOTES
[facility]  part of [facility]    Progress Note      Assessment and Plan:   1.  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  The patient required more blood product yesterday and I held the Brilinta again.    I had discussion with the GI service and they had lengthy discussion with the patient and daughter regarding options.  They offered transfer to [place]; however, the patient is disinclined to pursue those further interventions.  Gastroenterology feels that serial transfusion is the best option at this juncture.  They have suggested hematology opinion for this concern.    The hemoglobin is stabilizing and the patient has had a brown stool.  Okay to floor transfer and continue baby aspirin daily.    Recommendations:  1.  Continue baby aspirin off Brilinta  2.  As per neurology.  3.  Permissive hypertension  4.  Continue to follow hemoglobin and resume baby aspirin  5.  Get out of bed to chair.    2.  GI bleed-has had multiple AVMs identified previously.  As above, the options are extremely limited in this scenario and gastroenterology has recommended frequent outpatient transfusion.    Recommendations: Hematology opinion.  Serial hemoglobin.      3.  Bilateral PE-now has the IVC filter replaced.    Recommendations: Holding anticoagulation at this moment.      4.  Left groin hematoma-conservative management    5.  Chronic respiratory failure-patient has combined pulmonary fibrosis with emphysema.    Recommendations: Advair and conservative management at present    6.  Klebsiella UTI-off cefepime    7.  Port revision-had postprocedural oozing.  Now doing better.    Subjective:   Chester Bautista is a(n) 84 year old female who is breathing comfortably and had a brown BM.    Objective:   Blood pressure 116/52, pulse 85, temperature 97.1 °F (36.2 °C), temperature source Temporal, resp. rate 24, height 5' 1\" (1.549 m), weight 129 lb 12.8  oz (58.9 kg), SpO2 97%.    Physical Exam alert white female  HEENT examination is unremarkable with pupils equal round and reactive to light and accommodation.   Neck without adenopathy, thyromegaly, JVD nor bruit.   Lungs clear to auscultation and percussion.  Cardiac regular rate and rhythm no murmur.   Abdomen nontender, without hepatosplenomegaly and no mass appreciable.   Extremities without clubbing cyanosis nor edema.   Neurologic grossly intact with symmetric tone and strength and reflex except for lingering right homonymous hemianopsia partial.  Skin without gross abnormality     Results:     Lab Results   Component Value Date    WBC 8.7 02/04/2025    HGB 7.3 02/04/2025    HCT 23.5 02/04/2025    .0 02/04/2025    CREATSERUM 1.26 02/04/2025    BUN 30 02/04/2025     02/04/2025    K 3.9 02/04/2025     02/04/2025    CO2 25.0 02/04/2025     02/04/2025    CA 8.4 02/04/2025    MG 1.4 02/04/2025    B12 1,058 02/04/2025       Chapin Parker MD  Medical Director, Critical Care, [facility]  Medical Director, [facility]  Pager: [phone]

## 2025-02-05 VITALS
HEIGHT: 61 IN | OXYGEN SATURATION: 92 % | TEMPERATURE: 97 F | BODY MASS INDEX: 24.99 KG/M2 | HEART RATE: 82 BPM | DIASTOLIC BLOOD PRESSURE: 54 MMHG | WEIGHT: 132.38 LBS | RESPIRATION RATE: 24 BRPM | SYSTOLIC BLOOD PRESSURE: 121 MMHG

## 2025-02-05 LAB
ANION GAP SERPL CALC-SCNC: 9 MMOL/L (ref 0–18)
BASOPHILS # BLD AUTO: 0.05 X10(3) UL (ref 0–0.2)
BASOPHILS NFR BLD AUTO: 0.5 %
BUN BLD-MCNC: 27 MG/DL (ref 9–23)
BUN/CREAT SERPL: 25 (ref 10–20)
CALCIUM BLD-MCNC: 8.4 MG/DL (ref 8.7–10.4)
CHLORIDE SERPL-SCNC: 108 MMOL/L (ref 98–112)
CO2 SERPL-SCNC: 24 MMOL/L (ref 21–32)
CREAT BLD-MCNC: 1.08 MG/DL
DEPRECATED RDW RBC AUTO: 58.8 FL (ref 35.1–46.3)
EGFRCR SERPLBLD CKD-EPI 2021: 50 ML/MIN/1.73M2 (ref 60–?)
EOSINOPHIL # BLD AUTO: 0.36 X10(3) UL (ref 0–0.7)
EOSINOPHIL NFR BLD AUTO: 3.6 %
ERYTHROCYTE [DISTWIDTH] IN BLOOD BY AUTOMATED COUNT: 18.4 % (ref 11–15)
GLUCOSE BLD-MCNC: 156 MG/DL (ref 70–99)
GLUCOSE BLDC GLUCOMTR-MCNC: 198 MG/DL (ref 70–99)
HCT VFR BLD AUTO: 27.6 %
HGB BLD-MCNC: 8.7 G/DL
IMM GRANULOCYTES # BLD AUTO: 0.09 X10(3) UL (ref 0–1)
IMM GRANULOCYTES NFR BLD: 0.9 %
LYMPHOCYTES # BLD AUTO: 1.5 X10(3) UL (ref 1–4)
LYMPHOCYTES NFR BLD AUTO: 15 %
MAGNESIUM SERPL-MCNC: 1.4 MG/DL (ref 1.6–2.6)
MCH RBC QN AUTO: 27.5 PG (ref 26–34)
MCHC RBC AUTO-ENTMCNC: 31.5 G/DL (ref 31–37)
MCV RBC AUTO: 87.3 FL
MONOCYTES # BLD AUTO: 1.01 X10(3) UL (ref 0.1–1)
MONOCYTES NFR BLD AUTO: 10.1 %
NEUTROPHILS # BLD AUTO: 6.99 X10 (3) UL (ref 1.5–7.7)
NEUTROPHILS # BLD AUTO: 6.99 X10(3) UL (ref 1.5–7.7)
NEUTROPHILS NFR BLD AUTO: 69.9 %
OSMOLALITY SERPL CALC.SUM OF ELEC: 300 MOSM/KG (ref 275–295)
PLATELET # BLD AUTO: 227 10(3)UL (ref 150–450)
POTASSIUM SERPL-SCNC: 4.2 MMOL/L (ref 3.5–5.1)
RBC # BLD AUTO: 3.16 X10(6)UL
SODIUM SERPL-SCNC: 141 MMOL/L (ref 136–145)
WBC # BLD AUTO: 10 X10(3) UL (ref 4–11)

## 2025-02-05 PROCEDURE — 99233 SBSQ HOSP IP/OBS HIGH 50: CPT | Performed by: INTERNAL MEDICINE

## 2025-02-05 PROCEDURE — 99497 ADVNCD CARE PLAN 30 MIN: CPT | Performed by: NURSE PRACTITIONER

## 2025-02-05 PROCEDURE — 99239 HOSP IP/OBS DSCHRG MGMT >30: CPT | Performed by: HOSPITALIST

## 2025-02-05 RX ORDER — INSULIN DEGLUDEC 100 U/ML
10 INJECTION, SOLUTION SUBCUTANEOUS NIGHTLY
Status: SHIPPED | COMMUNITY
Start: 2025-02-05

## 2025-02-05 RX ORDER — PANTOPRAZOLE SODIUM 40 MG/1
40 TABLET, DELAYED RELEASE ORAL
Status: SHIPPED | COMMUNITY
Start: 2025-02-05

## 2025-02-05 RX ORDER — ACETAMINOPHEN 325 MG/1
650 TABLET ORAL EVERY 6 HOURS PRN
Status: SHIPPED | COMMUNITY
Start: 2025-02-05

## 2025-02-05 RX ORDER — ASPIRIN 81 MG/1
81 TABLET ORAL DAILY
Status: SHIPPED | COMMUNITY
Start: 2025-02-06

## 2025-02-05 RX ORDER — SENNOSIDES A AND B 8.6 MG/1
8.6 TABLET, FILM COATED ORAL 2 TIMES DAILY
Status: SHIPPED | COMMUNITY
Start: 2025-02-05

## 2025-02-05 RX ORDER — MAGNESIUM OXIDE 400 MG/1
800 TABLET ORAL ONCE
Status: COMPLETED | OUTPATIENT
Start: 2025-02-05 | End: 2025-02-05

## 2025-02-05 RX ORDER — MECLIZINE HCL 12.5 MG 12.5 MG/1
12.5 TABLET ORAL 3 TIMES DAILY PRN
Status: SHIPPED | COMMUNITY
Start: 2025-02-05

## 2025-02-05 NOTE — PALLIATIVE CARE NOTE
[facility]  part of [facility]  Palliative Care Progress Note    Chester Bautista Patient Status:  Inpatient    1940 MRN [id number]   Location [facility] 2W/SW Attending Casa Santos MD   Hosp Day # 18 PCP Heike Sorto MD     The  Cures Act makes medical notes like these available to patients in the interest of transparency. Please be advised this is a medical document. Medical documents are intended to carry relevant information, facts as evident, and the clinical opinion of the practitioner. The medical note is intended as peer to peer communication and may appear blunt or direct. It is written in medical language and may contain abbreviations or verbiage that are unfamiliar.     Subjective     When I entered the room, Prudencio is sitting up in the chair she is awake and alert, awaiting breakfast. Prudencio denies nausea this morning. Denies pain.      Review of pertinent medication requirements in past 24 hours: Reglan ivp X1    Palliative Care symptom needs assessed:   Pertinent items are noted in HPI/below    Fatigue:  Yes  Dyspnea: denies   Current O2 therapy: RA  Cough: + cough  Pain Present: denies  Non-verbal signs of pain present: NO  Appetite: poor  Constipation: denies  Diarrhea: denies  Nausea/Vomiting: nausea occasionally  Depression: denies  Anxiety: denies    Allergies:  Allergies[1]    Medications:     Current Facility-Administered Medications:     losartan (Cozaar) tab 25 mg, 25 mg, Oral, Daily    carvedilol (Coreg) tab 12.5 mg, 12.5 mg, Oral, BID with meals    aspirin DR tab 81 mg, 81 mg, Oral, Daily    insulin aspart (NovoLOG) 100 Units/mL FlexPen 1-7 Units, 1-7 Units, Subcutaneous, TID CC    sodium ferric gluconate (Ferrlecit) 125 mg in sodium chloride 0.9% 100mL IVPB premix, 125 mg, Intravenous, Daily    guaiFENesin (Robitussin) 100 MG/5 ML oral liquid 200 mg, 200 mg, Oral, Q4H PRN    pantoprazole (Protonix) DR tab 40 mg, 40 mg,  Oral, BID AC    magnesium hydroxide (Milk of Magnesia) 400 MG/5ML oral suspension 30 mL, 30 mL, Oral, Daily PRN    bisacodyl (Dulcolax) 10 MG rectal suppository 10 mg, 10 mg, Rectal, Daily PRN    fleet enema (Fleet) rectal enema 133 mL, 1 enema, Rectal, Once PRN    melatonin tab 3 mg, 3 mg, Oral, Nightly    labetalol (Trandate) 5 mg/mL injection 10 mg, 10 mg, Intravenous, Q4H PRN    glucose (Dex4) 15 GM/59ML oral liquid 15 g, 15 g, Oral, Q15 Min PRN **OR** glucose (Glutose) 40% oral gel 15 g, 15 g, Oral, Q15 Min PRN **OR** glucose-vitamin C (Dex-4) chewable tab 4 tablet, 4 tablet, Oral, Q15 Min PRN **OR** dextrose 50% injection 50 mL, 50 mL, Intravenous, Q15 Min PRN **OR** glucose (Dex4) 15 GM/59ML oral liquid 30 g, 30 g, Oral, Q15 Min PRN **OR** glucose (Glutose) 40% oral gel 30 g, 30 g, Oral, Q15 Min PRN **OR** glucose-vitamin C (Dex-4) chewable tab 8 tablet, 8 tablet, Oral, Q15 Min PRN    lidocaine-menthol 4-1 % patch 1 patch, 1 patch, Transdermal, Daily    octreoTIDe (SandoSTATIN) 50 mcg/mL injection 50 mcg, 50 mcg, Subcutaneous, Q8H    HYDROmorphone (Dilaudid) 1 MG/ML injection 0.2 mg, 0.2 mg, Intravenous, Q4H PRN    rosuvastatin (Crestor) tab 10 mg, 10 mg, Oral, Nightly    sennosides (Senokot) tab 8.6 mg, 8.6 mg, Oral, BID    cetirizine (ZyrTEC) tab 5 mg, 5 mg, Oral, Daily    acetaminophen (Tylenol Extra Strength) tab 500 mg, 500 mg, Oral, Q6H PRN    docusate sodium (Colace) cap 100 mg, 100 mg, Oral, BID    meclizine (Antivert) tab 12.5 mg, 12.5 mg, Oral, TID PRN    acetaminophen (Tylenol) tab 650 mg, 650 mg, Oral, Q4H PRN **OR** acetaminophen (Tylenol) rectal suppository 650 mg, 650 mg, Rectal, Q4H PRN    hydrALAzine (Apresoline) 20 mg/mL injection 10 mg, 10 mg, Intravenous, Q2H PRN    ondansetron (Zofran) 4 MG/2ML injection 4 mg, 4 mg, Intravenous, Q6H PRN    metoclopramide (Reglan) 5 mg/mL injection 5 mg, 5 mg, Intravenous, Q8H PRN    acetaminophen (Tylenol) tab 650 mg, 650 mg, Oral, Q6H PRN    ondansetron  (Zofran) 4 MG/2ML injection 4 mg, 4 mg, Intravenous, Q6H PRN    albuterol (Ventolin) (2.5 MG/3ML) 0.083% nebulizer solution 2.5 mg, 2.5 mg, Nebulization, Q4H PRN    cyclobenzaprine (Flexeril) tab 10 mg, 10 mg, Oral, Nightly PRN    fluticasone-salmeterol (Advair Diskus) 250-50 MCG/ACT inhaler 1 puff, 1 puff, Inhalation, 2 times per day    gabapentin (Neurontin) cap 100 mg, 100 mg, Oral, Nightly    montelukast (Singulair) tab 10 mg, 10 mg, Oral, Nightly    predniSONE (Deltasone) tab 5 mg, 5 mg, Oral, Daily    hydroxychloroquine (Plaquenil) tab 300 mg, 300 mg, Oral, Daily    oxyCODONE immediate release tab 5 mg, 5 mg, Oral, Q6H PRN    insulin degludec (Tresiba) 100 units/mL flextouch 10 Units, 10 Units, Subcutaneous, Nightly    Objective     Vital Signs:  Blood pressure 138/54, pulse 86, temperature 97.8 °F (36.6 °C), temperature source Temporal, resp. rate 18, height 5' 1\" (1.549 m), weight 132 lb 6.4 oz (60.1 kg), SpO2 94%.  Body mass index is 25.02 kg/m².  Present Level of pain: denies pain  Non-verbal signs of pain present: No    Physical Exam:  General: Prudencio is sitting up in the chair she is awake and alert.  She appears elderly and frail but in no apparent distress.  HEENT: moist oral MM  Cardiac: Regular rate and rhythm, S1, S2 normal, no murmur, rub or gallop.  Lungs: diminished with expiratory rales.  Normal excursions and effort. On RA  Abdomen: Soft, non-tender, + bowel sounds, no rebound or guarding + Bm's  Extremities: Without clubbing, cyanosis. BLE Edema not present  Neurologic: Alert and oriented X3, very pleasant moves all extremities.  Skin: Warm and dry.    Prior to admission Palliative performance scale PPSv2 (%): 60    Hematology:  Lab Results   Component Value Date    WBC 10.0 02/05/2025    HGB 8.7 (L) 02/05/2025    HCT 27.6 (L) 02/05/2025    .0 02/05/2025       Coags:  Lab Results   Component Value Date    INR 1.04 01/25/2025    PTT 51.3 (H) 01/30/2025       Chemistry:  Lab Results    Component Value Date    CREATSERUM 1.08 (H) 02/05/2025    BUN 27 (H) 02/05/2025     02/05/2025    K 4.2 02/05/2025     02/05/2025    CO2 24.0 02/05/2025     (H) 02/05/2025    CA 8.4 (L) 02/05/2025    ALB 3.2 01/31/2025    ALKPHO 71 01/31/2025    BILT 0.5 01/31/2025    TP 6.2 01/31/2025    AST 19 01/31/2025    ALT 7 (L) 01/31/2025    DDIMER 2.77 (H) 01/25/2025    MG 1.4 (L) 02/05/2025    PHOS 4.0 01/22/2025       Imaging:  No results found.      Summary of Discussion    2/5/25 followed up with Prudencio this morning. She is sitting up in the chair awake and alert. Very pleasant. Her dtr Tommy is present at the bedside.  Had extensive GOC conversation with pt and her dtr Tommy. Discussed setting limits prior to a crisis situation.  Prudencio stated she would not want to be in vegetative state and not on Breathing machine lying in the bed.   Pt and dtr despite wanting to focus on QOL are not ready to set limits at this time, they appear to be distrustful of the healthcare system in regards to setting limits. They fear that in the event something were to occur that is treatable, Prudencio would not get treatment.  I discussed DNAR/DNI with selective treatment does not mean no treatment, Prudencio would still get her medical treatments etc.   Prudencio and her dtr Tommy stated, in the event of a crisis situation and there were no hope for meaningful recovery, then they would set limits and \"let her go\". HCPOA pw filled out with pt and dtr.     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    Left subclavian stenosis s/p stent placement 1/24/25 1/31/25 IVC filter placement    Dizziness    Right hand paresthesia    Descending aortic Thrombus    Bilateral pulmonary embolism     Aortic arch thrombus    Acute blood loss anemia    Dizziness    HTN    Klebsiella UTI    Constipation    DM    HLD    COPD/sarcoidosis    RA    CKD    H/o  breast cancer     Nausea   -continue with Zofran as needed if continues would consider scheduling Zofran      Goals of care counseling  -see above for details  -Pt is Full Code  -Agreeable to palliative care following  -Dispo: Acute SHAKIR with [facility] if available. SW to help with dc planning.   - following  -ongoing GOC discussions will be needed over time.  -pt is not sure how long she will want to go through frequent blood transfusions.   -Prudencio and her dtr Tommy are not ready to set limits at this time. However in the event there were no hope for meaningful recovery then they would focus on letting go and setting limits.    -Provided emotional support to pt/family who are coping adequately     Advance care planning  -see above for details  -Pt's dtr Tommy Leyva is HC surrogate [phone]  -pt and dtr have been provided with blank POLST form for their review.  -HCPOA pw filled out with pt, original along with copies provided to pt and her dtr, will send copy to registration to be scanned into [employer].      Palliative Performance Scale 50%    Discussed today's visit with  Dr Santos and Kami SOW    Palliative Care Follow Up: Palliative care team will follow up on Friday if pt is still here. Feel free to contact our team with any questions or concerns.      Thank you for allowing Palliative Care services to participate in the care of Chester Bautista.    A total of  30  minutes were spent on this follow-up, which included all of the following: chart review, direct face to face contact, history taking, physical examination, counseling and coordinating care, and documentation.     Arleth Bhatti, [id number]  2/5/2025  10:14 AM  Palliative Care Services         [1]   Allergies  Allergen Reactions    Celebrex [Celecoxib] PALPITATIONS    Penicillins ITCHING and SWELLING    Amlodipine OTHER (SEE COMMENTS)     Calves Swelling     Metformin And Related UNKNOWN    Olmesartan UNKNOWN

## 2025-02-05 NOTE — DISCHARGE SUMMARY
[facility]  part of [facility]    Discharge Summary    Chester Bautista Patient Status:  Inpatient    1940 MRN [id number]   Location [facility] 2W/SW Attending Casa Santos MD   Hosp Day # 18 PCP Heike Sorto MD     Date of Admission: 2025 Disposition: Acute Rehab     Date of Discharge: 25      Admitting Diagnosis: Dizziness [R42]  Right hand paresthesia [R20.2]  Bilateral pulmonary embolism (HCC) [I26.99]  Acute cystitis without hematuria [N30.00]  Thrombus of aorta (HCC) [I74.10]  Cerebellar stroke (HCC) [I63.9]    Hospital Discharge Diagnoses:  CVA    Lace+ Score: 86  59-90 High Risk  29-58 Medium Risk  0-28   Low Risk.    TCM Follow-Up Recommendation:  LACE > 58: High Risk of readmission after discharge from the hospital.      Problem List:   Patient Active Problem List   Diagnosis    Adult general medical examination    Vaccine counseling    Colon cancer screening    Type 2 diabetes mellitus without retinopathy (HCC)    Sarcoidosis    Anemia, iron deficiency    Dyslipidemia    Hypercalcemia    Hypertension    Infiltrating ductal carcinoma of right breast, stage 1 (HCC)    Rheumatoid arthritis (HCC)    Osteoarthrosis    Peripheral vascular disease due to secondary diabetes mellitus (HCC)    Neuropathy    AVM (arteriovenous malformation) of duodenum, acquired    Pseudophakia of both eyes    Vitreous floaters of both eyes    Glaucoma suspect of both eyes    Iron deficiency anemia due to chronic blood loss    Malabsorption of iron (HCC)    Port-A-Cath in place    Chronic obstructive pulmonary disease, unspecified (HCC)    Adnexal mass    Encounter for care related to vascular access port    High risk medication use    Meibomian gland dysfunction (MGD) of both eyes    Hypokalemia    Hyperkalemia    Hypomagnesemia    Constipation    Gastric AVM    AVM (arteriovenous malformation) of small bowel, acquired with hemorrhage    Leukocytosis    Presence of IVC  filter    CKD (chronic kidney disease) stage 3, GFR 30-59 ml/min (HCC)    Dry eye syndrome of both eyes    Vitamin D deficiency    Cystic thyroid nodule    Cough    Chronic fatigue    Cerebellar stroke (HCC)    Dizziness    Right hand paresthesia    Thrombus of aorta (HCC)    Bilateral pulmonary embolism (HCC)    Acute cystitis without hematuria    PE (pulmonary thromboembolism) (HCC)    Aortic thrombus (HCC)    New cerebellar infarct (HCC)    Small bowel bleed not requiring more than 4 units of blood in 24 hours, ICU, or surgery    Acute blood loss anemia    Cerebrovascular accident (CVA) due to embolism of precerebral artery (HCC)    Melena    Visual hallucinations    Goals of care, counseling/discussion    Palliative care encounter       Reason for Admission:   Anemia  Left Cerebellar Infarcts  SAH  Dizziness  PE   Descending aortic arch thrombus   HTN  Klebsiella UTI  Constipation  HTN  HLD  DM  COPD/?sarcoidosis   Rheumatoid Arthritis  DVT/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 Milan Briggs Renée Bautista is a 84 year old female with HTN, HLD, DM, COPD, arthritis, hx of prior DVT PE s/p IVC filter, and Anemia who presented with room spinning since 1400 today.   was sitting on couch talking on the phone when the dizziness started, with associated emesis X1.  Thereafter she started having some right shoulder pain and right arm tingling.  Daughter at bedside   mom can do basic household tasks however is otherwise sedentary laying on bed.  Last month on  family had a long car ride to [place] to attend a .                 In ED initial /83 with WBC 11.2 with left shift, UA with signs of UTI.  Stroke alert called, found to have 2 small cerebellar infarcts on her MRI and large mural thrombus in descending aorta, per radiology it is chronic though patient stated she was not aware of it. Also found to have small subsegmental PEs on CTA Chest. She took 3 baby aspirins prior to arrival. Otherwise she is not on any anticoagulation at home due to prior severe bleeding and GI AVMs. Neurology and vascular surgery consulted. Patient given meclizine for dizziness, and labetalol for HTN.  Admitted for further management.     Hospital Course:   Anemia  -in the setting of PE and aortic arch thrombus, the decision was made to resume anticoagulation despite the patients history of bleeding AVMs  -monitor H&H, transfuse as indicated  -GI on consult                Start octreotide 50 mcg TID  Continue Brilinta as the benefit of AC are >> risks of bleeding  Family declined endoscopic eval also declined transfer to a tertiary care center for balloon enteroscopy and colonoscopy  Risk-benefit discussed continue on aspirin 81 mg daily but no other blood thinners risks and benefits discussed in detail  -transfused pRBC (total 2U pRBC and 1U platelets since admission)  -d/w patient and GI  -Would not want endoscopic evaluations given the rapidity that her AVMs recur seen by hematology appreciate their evaluation  -Plan close outpatient follow-up and serial transfusions at this juncture.  -d/w neurology and vascular surgery  -plan asa 81 mg daily, will not plan brillinta, due to risk, d/w patient  -Resume Depo octreotide on discharge home from rehab  -Patient has to follow-up with Dr. Mohamud for monitoring of hemoglobin and blood transfusions        #Left Cerebellar  Infarcts  #SAH  - pt with new sudden onset neuro changes  - 2 small cerebellar infarcts seen on admission MRI  -Patient already took 3 baby aspirin's prior to coming to ED   - CTA negative for LVO, but showing small subsegmental Pes  - Stroke alert called 1/19 -> stroke CT non-acute, MRI showing small CVA in L thalamus   - Neuro on consult  -Continue statin  -high risk morbidity/mortality given her history of GI bleed and now need for antiplatelet therapy and potential anticoagulation in the future. Family is aware  -gradually lower BP  -Vascular surgery on consult  -s/p stent placement 10/24/2025  -off brillinta-->d/w vascular surgery ok with asa 81mg  -CT head reviewed, repeat CT pending  -Appreciate further neuro and vascular surgery input  -continue present management  -d/w neuro and vascular surgery, plan asa 81 mg daily     #Dizziness  Orthostatic hypotension  Improved   -Likely in setting of above  -Meclizine prn   -Neurology consultation appreciated  -Continue carvedilol will stop losartan with lower blood pressure     #PE   -Patient is hemodynamically stable, EKG NSR and troponin not elevated.  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 Already took 3 baby aspirins prior to admission  -Previously had IVC filter placed in March 2024 for PE at that time (removed June 2024).   -Due to bleeding risk now has a permanent IVC filter  -Continue aspirin 81 mg daily  2/1 : s/p IVC filter on 1/31      #Descending aortic arch thrombus   -Admission CTA chest revealing mural thrombus throughout the thoracic and abdominal aorta, chronic per radiology  -Vascular surgery on consult  -s/p angiogram 01/24/2025  -cont statin for now   -Aspirin daily     #HTN  - decrease antihypertensives as bp on lower side  -home meds diovan 40mg bid and carvedilol 12.5 bid  -plan losartan 25mg daily and carvdilol 12.5 bid        #Klebsiella UTI  -nitrofurantoin initiated in ED, UCx reviewed - nitrofurantoin resistant  -started on IV  Cefepime - completed course of treatment     #Constipation  -bowel regimen ordered      Chronic Medical Problems  HTN  HLD  DM  COPD/?sarcoidosis   Rheumatoid Arthritis  DVT/PE s/p IVC which has been removed   CKD3  Anemia - in setting of bleeding AVMs; getting iron infusions   Hx of Breast Cancer                 -port changed via IR    Consultations:   Neurology  Pulmonology  Palliative care  Hematology  Gastroenterology  PMR    Procedures:   -s/p stent placement 1/24/2025  -Status post IVC filter on 1/31/2025  Complications: n/a    Discharge Condition: Good    Discharge Medications:      Discharge Medications        START taking these medications        Instructions Prescription details   aspirin 81 MG Tbec  Start taking on: February 6, 2025      Take 1 tablet (81 mg total) by mouth daily.   Refills: 0     insulin aspart 100 Units/mL Sopn  Commonly known as: NovoLOG      Inject 1-7 Units into the skin 3 (three) times daily with meals.   Refills: 0     insulin degludec 100 units/mL Sopn  Commonly known as: Tresiba      Inject 10 Units into the skin nightly.   Refills: 0     lidocaine-menthol 4-1 % Ptch  Start taking on: February 6, 2025      Place 1 patch onto the skin daily.   Refills: 0     meclizine 12.5 MG Tabs  Commonly known as: Antivert      Take 1 tablet (12.5 mg total) by mouth 3 (three) times daily as needed for Dizziness.   Refills: 0     pantoprazole 40 MG Tbec  Commonly known as: Protonix      Take 1 tablet (40 mg total) by mouth 2 (two) times daily before meals.   Refills: 0     sennosides 8.6 MG Tabs  Commonly known as: Senokot      Take 1 tablet (8.6 mg total) by mouth 2 (two) times daily.   Refills: 0            CHANGE how you take these medications        Instructions Prescription details   acetaminophen 500 MG Tabs  Commonly known as: Tylenol Extra Strength  What changed: Another medication with the same name was changed. Make sure you understand how and when to take each.      Take 1 tablet (500  mg total) by mouth daily.   Refills: 0     acetaminophen 325 MG Tabs  Commonly known as: Tylenol  What changed:   when to take this  reasons to take this      Take 2 tablets (650 mg total) by mouth every 6 (six) hours as needed.   Refills: 0     albuterol 108 (90 Base) MCG/ACT Aers  Commonly known as: Ventolin HFA  What changed: Another medication with the same name was removed. Continue taking this medication, and follow the directions you see here.      TAKE 2 PUFFS BY MOUTH EVERY 4 TO 6 HOURS AS NEEDED   Quantity: 6.7 each  Refills: 5     albuterol (2.5 MG/3ML) 0.083% Nebu  Commonly known as: Ventolin  What changed: Another medication with the same name was removed. Continue taking this medication, and follow the directions you see here.      Take 3 mL (2.5 mg total) by nebulization every 6 (six) hours as needed for Wheezing.   Quantity: 360 mL  Refills: 3     Vitamin D3 1.25 MG (48167 UT) Caps  What changed: additional instructions      TAKE 1 CAPSULE BY MOUTH EVERY 2 WEEKS   Quantity: 6 capsule  Refills: 1            CONTINUE taking these medications        Instructions Prescription details   BD Pen Needle Tata 2nd Gen 32G X 4 MM Misc  Generic drug: Insulin Pen Needle      1 EACH DAILY. USE DAILY WITH INSULIN   Quantity: 100 each  Refills: 1     Blood Gluc Meter Disp-Strips Nica      Dx. E11.9. Checks qam.   Quantity: 100 each  Refills: 0     carvedilol 12.5 MG Tabs  Commonly known as: Coreg      Take 1 tablet (12.5 mg total) by mouth 2 (two) times daily with meals.   Quantity: 180 tablet  Refills: 3     cyclobenzaprine 10 MG Tabs  Commonly known as: Flexeril      Take 1 tablet (10 mg total) by mouth nightly as needed.   Quantity: 90 tablet  Refills: 1     fluticasone-salmeterol 250-50 MCG/ACT Aepb  Commonly known as: Wixela Inhub      Inhale 1 puff into the lungs every 12 (twelve) hours.   Quantity: 1 each  Refills: 5     gabapentin 100 MG Caps  Commonly known as: Neurontin      Take 1 capsule (100 mg total)  by mouth nightly.   Quantity: 90 capsule  Refills: 3     glipiZIDE 5 MG Tabs  Commonly known as: Glucotrol      Take 1 tablet (5 mg total) by mouth every morning before breakfast.   Quantity: 90 tablet  Refills: 0     hydroxychloroquine 200 MG Tabs  Commonly known as: Plaquenil      Take 1.5 tablets (300 mg total) by mouth daily.   Quantity: 135 tablet  Refills: 1     ipratropium 0.02 % Soln  Commonly known as: Atrovent      Take 2.5 mL (500 mcg total) by nebulization 2 (two) times daily.   Quantity: 180 each  Refills: 2     loratadine 10 MG Tabs  Commonly known as: Claritin      TAKE 1 TABLET BY MOUTH EVERY DAY   Quantity: 30 tablet  Refills: 1     magnesium oxide 400 MG Tabs  Commonly known as: Mag-Ox      Take 1 tablet (400 mg total) by mouth in the morning, at noon, and at bedtime. Per Dr. JENSEN Mata   Refills: 0     montelukast 10 MG Tabs  Commonly known as: Singulair      Take 1 tablet (10 mg total) by mouth nightly.   Quantity: 90 tablet  Refills: 3     OneTouch Delica Lancets 33G Misc      4 x daily   Quantity: 100 each  Refills: 1     Accu-Chek FastClix Lancets Misc      Check sugar once daily as directed (E11.42)   Quantity: 100 each  Refills: 2     OneTouch Verio w/Device Kit      1 each daily. Test 1x daily   Quantity: 1 kit  Refills: 0     Blood Glucose System Adrian Kit      Dx. E11.9. Checks qam.   Quantity: 1 kit  Refills: 0     Blood Glucose Monitoring Suppl w/Device Kit      Dx. E11.9. Checks qam.   Quantity: 1 kit  Refills: 0     predniSONE 5 MG Tabs  Commonly known as: Deltasone      Take 1 tablet (5 mg total) by mouth daily.   Quantity: 90 tablet  Refills: 1     rosuvastatin 20 MG Tabs  Commonly known as: Crestor      Take 1 tablet (20 mg total) by mouth nightly.   Quantity: 90 tablet  Refills: 3            STOP taking these medications      ciprofloxacin 500 MG Tabs  Commonly known as: Cipro        doxycycline 100 MG Caps  Commonly known as: Vibramycin        famotidine 20 MG Tabs  Commonly known as:  Pepcid        Lantus SoloStar 100 UNIT/ML Sopn  Generic drug: insulin glargine        valsartan 80 MG Tabs  Commonly known as: Diovan               ASK your doctor about these medications        Instructions Prescription details   Potassium Chloride ER 20 MEQ Tbcr      Take 40 mEq by mouth every morning.   Quantity: 60 tablet  Refills: 1               Where to Get Your Medications        These medications were sent to [facility]/pharmacy #[id number] - [place], [place] - [address] [phone], [phone]  [address], [facility] [address]      Phone: [phone]   Vitamin D3 1.25 MG (02499 UT) Caps         Follow up Visits: Follow-up with pcp in 1 week    Follow up Labs: hb with hematology     Other Discharge Instructions: n/a    VIDYA CORDOBA MD  2/5/2025  2:53 PM    > 35 min

## 2025-02-05 NOTE — PLAN OF CARE
Gave report to Lorna SOW at [phone], admission and dc fu. No skin breakdown, right femoral site puncture due to IVC filter insertion.  Problem: HEMATOLOGIC - ADULT  Goal: Free from bleeding injury  Description: (Example usage: patient with low platelets)  INTERVENTIONS:  - Avoid intramuscular injections, enemas and rectal medication administration  - Ensure safe mobilization of patient  - Hold pressure on venipuncture sites to achieve adequate hemostasis  - Assess for signs and symptoms of internal bleeding  - Monitor lab trends  - Patient is to report abnormal signs of bleeding to staff  - Avoid use of toothpicks and dental floss  - Use electric shaver for shaving  - Use soft bristle tooth brush  - Limit straining and forceful nose blowing  Outcome: Completed     Problem: Patient Centered Care  Goal: Patient preferences are identified and integrated in the patient's plan of care  Description: Interventions:  - What would you like us to know as we care for you? From home with daughter   - Provide timely, complete, and accurate information to patient/family  - Incorporate patient and family knowledge, values, beliefs, and cultural backgrounds into the planning and delivery of care  - Encourage patient/family to participate in care and decision-making at the level they choose  - Honor patient and family perspectives and choices  Outcome: Completed     Problem: Diabetes/Glucose Control  Goal: Glucose maintained within prescribed range  Description: INTERVENTIONS:  - Monitor Blood Glucose as ordered  - Assess for signs and symptoms of hyperglycemia and hypoglycemia  - Administer ordered medications to maintain glucose within target range  - Assess barriers to adequate nutritional intake and initiate nutrition consult as needed  - Instruct patient on self management of diabetes  Outcome: Completed     Problem: Patient/Family Goals  Goal: Patient/Family Long Term Goal  Description: Patient's Long Term Goal:  discharge from hospital     Interventions:  - monitor vital signs   - monitor blood glucose levels  - monitor appropriate labs  - pain management   - administer medications per order  - follow MD orders  - diagnostics per order  - update and inform patient and family on plan of care  - discharge planning  - See additional Care Plan goals for specific interventions  Outcome: Completed  Goal: Patient/Family Short Term Goal  Description: Patient's Short Term Goal: manage pain     Interventions:   - monitor vital signs   - monitor blood glucose levels  - monitor appropriate labs  - pain management   - administer medications per order  - follow MD orders  - diagnostics per order  - update and inform patient and family on plan of care  - See additional Care Plan goals for specific interventions  Outcome: Completed     Problem: CARDIOVASCULAR - ADULT  Goal: Maintains optimal cardiac output and hemodynamic stability  Description: INTERVENTIONS:  - Monitor vital signs, rhythm, and trends  - Monitor for bleeding, hypotension and signs of decreased cardiac output  - Evaluate effectiveness of vasoactive medications to optimize hemodynamic stability  - Monitor arterial and/or venous puncture sites for bleeding and/or hematoma  - Assess quality of pulses, skin color and temperature  - Assess for signs of decreased coronary artery perfusion - ex. Angina  - Evaluate fluid balance, assess for edema, trend weights  Outcome: Completed  Goal: Absence of cardiac arrhythmias or at baseline  Description: INTERVENTIONS:  - Continuous cardiac monitoring, monitor vital signs, obtain 12 lead EKG if indicated  - Evaluate effectiveness of antiarrhythmic and heart rate control medications as ordered  - Initiate emergency measures for life threatening arrhythmias  - Monitor electrolytes and administer replacement therapy as ordered  Outcome: Completed     Problem: METABOLIC/FLUID AND ELECTROLYTES - ADULT  Goal: Glucose maintained within  prescribed range  Description: INTERVENTIONS:  - Monitor Blood Glucose as ordered  - Assess for signs and symptoms of hyperglycemia and hypoglycemia  - Administer ordered medications to maintain glucose within target range  - Assess barriers to adequate nutritional intake and initiate nutrition consult as needed  - Instruct patient on self management of diabetes  Outcome: Completed  Goal: Electrolytes maintained within normal limits  Description: INTERVENTIONS:  - Monitor labs and rhythm and assess patient for signs and symptoms of electrolyte imbalances  - Administer electrolyte replacement as ordered  - Monitor response to electrolyte replacements, including rhythm and repeat lab results as appropriate  - Fluid restriction as ordered  - Instruct patient on fluid and nutrition restrictions as appropriate  Outcome: Completed  Goal: Hemodynamic stability and optimal renal function maintained  Description: INTERVENTIONS:  - Monitor labs and assess for signs and symptoms of volume excess or deficit  - Monitor intake, output and patient weight  - Monitor urine specific gravity, serum osmolarity and serum sodium as indicated or ordered  - Monitor response to interventions for patient's volume status, including labs, urine output, blood pressure (other measures as available)  - Encourage oral intake as appropriate  - Instruct patient on fluid and nutrition restrictions as appropriate  Outcome: Completed     Problem: NEUROLOGICAL - ADULT  Goal: Achieves stable or improved neurological status  Description: INTERVENTIONS  - Assess for and report changes in neurological status  - Initiate measures to prevent increased intracranial pressure  - Maintain blood pressure and fluid volume within ordered parameters to optimize cerebral perfusion and minimize risk of hemorrhage  - Monitor temperature, glucose, and sodium. Initiate appropriate interventions as ordered  Outcome: Completed  Goal: Achieves maximal functionality and self  care  Description: INTERVENTIONS  - Monitor swallowing and airway patency with patient fatigue and changes in neurological status  - Encourage and assist patient to increase activity and self care with guidance from PT/OT  - Encourage visually impaired, hearing impaired and aphasic patients to use assistive/communication devices  Outcome: Completed     Problem: PAIN - ADULT  Goal: Verbalizes/displays adequate comfort level or patient's stated pain goal  Description: INTERVENTIONS:  - Encourage pt to monitor pain and request assistance  - Assess pain using appropriate pain scale  - Administer analgesics based on type and severity of pain and evaluate response  - Implement non-pharmacological measures as appropriate and evaluate response  - Consider cultural and social influences on pain and pain management  - Manage/alleviate anxiety  - Utilize distraction and/or relaxation techniques  - Monitor for opioid side effects  - Notify MD/LIP if interventions unsuccessful or patient reports new pain  - Anticipate increased pain with activity and pre-medicate as appropriate  Outcome: Completed     Problem: SAFETY ADULT - FALL  Goal: Free from fall injury  Description: INTERVENTIONS:  - Assess pt frequently for physical needs  - Identify cognitive and physical deficits and behaviors that affect risk of falls.  - [place] fall precautions as indicated by assessment.  - Educate pt/family on patient safety including physical limitations  - Instruct pt to call for assistance with activity based on assessment  - Modify environment to reduce risk of injury  - Provide assistive devices as appropriate  - Consider OT/PT consult to assist with strengthening/mobility  - Encourage toileting schedule  Outcome: Completed     Problem: DISCHARGE PLANNING  Goal: Discharge to home or other facility with appropriate resources  Description: INTERVENTIONS:  - Identify barriers to discharge w/pt and caregiver  - Include patient/family/discharge  partner in discharge planning  - Arrange for needed discharge resources and transportation as appropriate  - Identify discharge learning needs (meds, wound care, etc)  - Arrange for interpreters to assist at discharge as needed  - Consider post-discharge preferences of patient/family/discharge partner  - Complete POLST form as appropriate  - Assess patient's ability to be responsible for managing their own health  - Refer to Case Management Department for coordinating discharge planning if the patient needs post-hospital services based on physician/LIP order or complex needs related to functional status, cognitive ability or social support system  Outcome: Completed     Problem: SKIN/TISSUE INTEGRITY - ADULT  Goal: Skin integrity remains intact  Description: INTERVENTIONS  - Assess and document risk factors for pressure ulcer development  - Assess and document skin integrity  - Monitor for areas of redness and/or skin breakdown  - Initiate interventions, skin care algorithm/standards of care as needed  Outcome: Completed     Problem: MUSCULOSKELETAL - ADULT  Goal: Return mobility to safest level of function  Description: INTERVENTIONS:  - Assess patient stability and activity tolerance for standing, transferring and ambulating w/ or w/o assistive devices  - Assist with transfers and ambulation using safe patient handling equipment as needed  - Ensure adequate protection for wounds/incisions during mobilization  - Obtain PT/OT consults as needed  - Advance activity as appropriate  - Communicate ordered activity level and limitations with patient/family  Outcome: Completed     Problem: Impaired Swallowing  Goal: Minimize aspiration risk  Description: Interventions:  - Patient should be alert and upright for all feedings (90 degrees preferred)  - Offer food and liquids at a slow rate  - No straws  - Encourage small bites of food and small sips of liquid  - Offer pills one at a time, crush or deliver with applesauce as  needed  - Discontinue feeding and notify MD (or speech pathologist) if coughing or persistent throat clearing or wet/gurgly vocal quality is noted  Outcome: Completed

## 2025-02-05 NOTE — PHYSICAL THERAPY NOTE
PHYSICAL THERAPY TREATMENT NOTE - INPATIENT     Room Number: 217/217-A       Presenting Problem: cerebellar CVA  Co-Morbidities : COPD, diabetes, HTN, RA, GI bleed    Problem List  Principal Problem:    Cerebellar stroke (HCC)  Active Problems:    AVM (arteriovenous malformation) of small bowel, acquired with hemorrhage    Dizziness    Right hand paresthesia    Thrombus of aorta (HCC)    Bilateral pulmonary embolism (HCC)    Acute cystitis without hematuria    Small bowel bleed not requiring more than 4 units of blood in 24 hours, ICU, or surgery    Acute blood loss anemia    Cerebrovascular accident (CVA) due to embolism of precerebral artery (HCC)    Melena    Visual hallucinations    Goals of care, counseling/discussion    Palliative care encounter      PHYSICAL THERAPY ASSESSMENT   Patient demonstrates good  progress this session, goals  remain in progress.      Patient is requiring contact guard assist as a result of the following impairments: impaired standing dynamic balance, cognitive deficits (decreased safety awareness), and R sided inattention .     Patient continues to function below baseline with gait and stair negotiation.  Next session anticipate patient to progress transfers, gait, and stair negotiation.  Physical Therapy will continue to follow patient for duration of hospitalization.    Patient continues to benefit from continued skilled PT services: to facilitate return to prior level of function as patient demonstrates high motivation with excellent tolerance to an intensive therapy program .    PLAN DURING HOSPITALIZATION  Nursing Mobility Recommendation : 1 Assist  PT Device Recommendation: Rolling walker;Gait belt  PT Treatment Plan: Bed mobility;Patient education;Family education;Gait training  Frequency (Obs): 5x/week     SUBJECTIVE  \"I love you all, but it's time I move on.\"    OBJECTIVE  Precautions: Bed/chair alarm    WEIGHT BEARING RESTRICTION  none    PAIN ASSESSMENT   Rating:  0  Location: denies  Management Techniques: Activity promotion;Body mechanics;Repositioning (gentle/light soft tissue mobilization)    BALANCE  Static Sitting: Good  Dynamic Sitting: Fair +  Static Standing: Fair +  Dynamic Standing: Fair    ACTIVITY TOLERANCE  Pulse: 83  Heart Rate Source: Monitor     BP: 118/51 (sitting, 74/38 standing post ambulation, asymptomatic, RN notified)  BP Location: Left arm  BP Method: Automatic  Patient Position: Sitting     O2 WALK  Oxygen Therapy  SPO2% on Room Air at Rest: 93  SPO2% Ambulation on Room Air: 87    AM-PAC '6-Clicks' INPATIENT SHORT FORM - BASIC MOBILITY  How much difficulty does the patient currently have...  Patient Difficulty: Turning over in bed (including adjusting bedclothes, sheets and blankets)?: A Little   Patient Difficulty: Sitting down on and standing up from a chair with arms (e.g., wheelchair, bedside commode, etc.): A Little   Patient Difficulty: Moving from lying on back to sitting on the side of the bed?: A Little   How much help from another person does the patient currently need...   Help from Another: Moving to and from a bed to a chair (including a wheelchair)?: A Little   Help from Another: Need to walk in hospital room?: A Little   Help from Another: Climbing 3-5 steps with a railing?: A Little     AM-PAC Score:  Raw Score: 18   Approx Degree of Impairment: 46.58%   Standardized Score (AM-PAC Scale): 43.63   CMS Modifier (G-Code): CK    FUNCTIONAL ABILITY STATUS  Functional Mobility/Gait Assessment  Gait Assistance: Contact guard assist  Distance (ft): 75  Assistive Device: Rolling walker  Pattern: Comment (kyphotic posture, step-through pattern, moderate pace, decreased attention to R field of vision with verbal cues to avoid obstacles on R with walker)  Sit to Stand: stand-by assist and contact guard assist    Skilled Therapy Provided: Since previous session patient has progressed, requiring decreased assist with sit-to-stand transfers and  ambulating household distances with walker. Patient demonstrates R sided inattention, hitting obstacles on R with walker x3 requiring cueing to attend contributing to increased fall risk. Patient noted to have dried, caked stool in perianal area, assisted with sit-to-stand transfers and standing balance for patient to clean self, donned clean undergarments and replaced Purewick. Patient received seated in bedside chair, agreeable to physical therapy. Vital signs monitored as noted above, patient experiencing orthostatic hypotension with standing, asymptomatic, RN notified . Anticipated therapy needs remain appropriate based on the patient's performance, personal factors, and remaining functional impairments.    PATIENT EDUCATION  Education Provided To: Family/Caregiver;Patient  Patient Education: Role of Physical Therapy  Patient's Response to Education: Verbalized Understanding  Family/Caregiver Education: Role of Physical Therapy  Family/Caregiver's Response to Education: Verbalized Understanding    The patient's Approx Degree of Impairment: 46.58% has been calculated based on documentation in the [facility] '6 clicks' Inpatient Daily Activity Short Form.  Research supports that patients with this level of impairment may benefit from home-health PT, however based on patient's prior level of function and fall risk anticipate would benefit from a rehab facility.  Final disposition will be made by interdisciplinary medical team.      Patient End of Session: Up in chair;Needs met;Call light within reach;RN aware of session/findings;All patient questions and concerns addressed;Hospital anti-slip socks    CURRENT GOALS   Goals to be met by: 2/23/25  Patient Goal Patient's self-stated goal is: go home, be stronger   Goal #1 Patient is able to demonstrate supine - sit EOB @ level: modified independent      Goal #1   Current Status NOT MET/IN PROGRESS    Goal #2 Patient is able to demonstrate transfers Sit to/from Stand at  assistance level: modified independent with walker - rolling      Goal #2  Current Status NOT MET/IN PROGRESS    Goal #3 Patient is able to ambulate 150 feet with assist device: walker - rolling at assistance level: supervision   Goal #3   Current Status NOT MET/IN PROGRESS    Goal #4 Patient will negotiate 10 stairs with rail and supervision   Goal #4   Current Status Not tested   Goal #5 Patient to demonstrate independence with home activity/exercise instructions provided to patient in preparation for discharge.   Goal #5   Current Status progressing     Gait Training: 10 minutes  Therapeutic Activity: 30 minutes      Mariaa Tracy PT, DPT  [facility]  Rehab Services - Physical Therapy  [id number]

## 2025-02-05 NOTE — PROGRESS NOTES
[facility]  part of [facility]    Progress Note      Assessment and Plan:   1.  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      Recommendations:  1.  Continue baby aspirin off Brilinta  2.  As per neurology.  3.  Permissive hypertension  4.  Continue to follow hemoglobin and resume baby aspirin  5.  Get out of bed to chair.    2.  GI bleed-has had multiple AVMs identified previously.  As above, the options are extremely limited in this scenario and gastroenterology has recommended frequent outpatient transfusion.    Recommendations: Limit anticoagulation to baby aspirin for now.  Serial hemoglobin.      3.  Bilateral PE-now has the IVC filter replaced.    Recommendations: Holding anticoagulation at this moment.      4.  Left groin hematoma-conservative management    5.  Chronic respiratory failure-patient has combined pulmonary fibrosis with emphysema.    Recommendations: Advair and conservative management at present.  Patient to return to see me in the office after she leaves rehab.    6.  Klebsiella UTI-off cefepime    7.  Port revision-had postprocedural oozing.  Now doing better.    Subjective:   Chester Bautista is a(n) 84 year old female who is breathing comfortably and had a brown BM.    Objective:   Blood pressure 133/49, pulse 88, temperature 97.8 °F (36.6 °C), temperature source Temporal, resp. rate 19, height 5' 1\" (1.549 m), weight 132 lb 6.4 oz (60.1 kg), SpO2 93%.    Physical Exam alert white female  HEENT examination is unremarkable with pupils equal round and reactive to light and accommodation.   Neck without adenopathy, thyromegaly, JVD nor bruit.   Lungs clear to auscultation and percussion.  Cardiac regular rate and rhythm no murmur.   Abdomen nontender, without hepatosplenomegaly and no mass appreciable.   Extremities without clubbing cyanosis nor edema.   Neurologic grossly intact with symmetric tone and strength  and reflex except for lingering right homonymous hemianopsia partial.  Skin without gross abnormality     Results:     Lab Results   Component Value Date    WBC 10.0 02/05/2025    HGB 8.7 02/05/2025    HCT 27.6 02/05/2025    .0 02/05/2025    CREATSERUM 1.08 02/05/2025    BUN 27 02/05/2025     02/05/2025    K 4.2 02/05/2025     02/05/2025    CO2 24.0 02/05/2025     02/05/2025    CA 8.4 02/05/2025    MG 1.4 02/05/2025       Chapin Parker MD  Medical Director, Critical Care, [facility]  Medical Director, [facility]  Pager: [phone]

## 2025-02-05 NOTE — CM/SW NOTE
02/05/25 1227   Discharge disposition   Expected discharge disposition Rehab Facili   Post Acute Care Provider   [facility]   Discharge transportation [facility]     Pt discussed during nursing rounds. Pt is stable for dc today. MD dc order entered. Pt will [facility], liaguero [place]ard confirmed ins auth has been obtained and bed is available today. Pt's daughter Brea agreeable transfer today and cost of transport which will be $130. [facility] scheduled for 3pm .     Number for nurse report is [phone].    Plan: [facility] for AIR today.    / to remain available for support and/or discharge planning.     Vasile Lemus, BENJIN    [phone]

## 2025-02-05 NOTE — PLAN OF CARE
Problem: HEMATOLOGIC - ADULT  Goal: Free from bleeding injury  Description: (Example usage: patient with low platelets)  INTERVENTIONS:  - Avoid intramuscular injections, enemas and rectal medication administration  - Ensure safe mobilization of patient  - Hold pressure on venipuncture sites to achieve adequate hemostasis  - Assess for signs and symptoms of internal bleeding  - Monitor lab trends  - Patient is to report abnormal signs of bleeding to staff  - Avoid use of toothpicks and dental floss  - Use electric shaver for shaving  - Use soft bristle tooth brush  - Limit straining and forceful nose blowing  Outcome: Progressing     Problem: Diabetes/Glucose Control  Goal: Glucose maintained within prescribed range  Description: INTERVENTIONS:  - Monitor Blood Glucose as ordered  - Assess for signs and symptoms of hyperglycemia and hypoglycemia  - Administer ordered medications to maintain glucose within target range  - Assess barriers to adequate nutritional intake and initiate nutrition consult as needed  - Instruct patient on self management of diabetes  Outcome: Progressing     Problem: CARDIOVASCULAR - ADULT  Goal: Maintains optimal cardiac output and hemodynamic stability  Description: INTERVENTIONS:  - Monitor vital signs, rhythm, and trends  - Monitor for bleeding, hypotension and signs of decreased cardiac output  - Evaluate effectiveness of vasoactive medications to optimize hemodynamic stability  - Monitor arterial and/or venous puncture sites for bleeding and/or hematoma  - Assess quality of pulses, skin color and temperature  - Assess for signs of decreased coronary artery perfusion - ex. Angina  - Evaluate fluid balance, assess for edema, trend weights  Outcome: Progressing  Goal: Absence of cardiac arrhythmias or at baseline  Description: INTERVENTIONS:  - Continuous cardiac monitoring, monitor vital signs, obtain 12 lead EKG if indicated  - Evaluate effectiveness of antiarrhythmic and heart rate  control medications as ordered  - Initiate emergency measures for life threatening arrhythmias  - Monitor electrolytes and administer replacement therapy as ordered  Outcome: Progressing     Problem: METABOLIC/FLUID AND ELECTROLYTES - ADULT  Goal: Glucose maintained within prescribed range  Description: INTERVENTIONS:  - Monitor Blood Glucose as ordered  - Assess for signs and symptoms of hyperglycemia and hypoglycemia  - Administer ordered medications to maintain glucose within target range  - Assess barriers to adequate nutritional intake and initiate nutrition consult as needed  - Instruct patient on self management of diabetes  Outcome: Progressing  Goal: Electrolytes maintained within normal limits  Description: INTERVENTIONS:  - Monitor labs and rhythm and assess patient for signs and symptoms of electrolyte imbalances  - Administer electrolyte replacement as ordered  - Monitor response to electrolyte replacements, including rhythm and repeat lab results as appropriate  - Fluid restriction as ordered  - Instruct patient on fluid and nutrition restrictions as appropriate  Outcome: Progressing  Goal: Hemodynamic stability and optimal renal function maintained  Description: INTERVENTIONS:  - Monitor labs and assess for signs and symptoms of volume excess or deficit  - Monitor intake, output and patient weight  - Monitor urine specific gravity, serum osmolarity and serum sodium as indicated or ordered  - Monitor response to interventions for patient's volume status, including labs, urine output, blood pressure (other measures as available)  - Encourage oral intake as appropriate  - Instruct patient on fluid and nutrition restrictions as appropriate  Outcome: Progressing

## 2025-02-06 ENCOUNTER — PATIENT OUTREACH (OUTPATIENT)
Dept: CASE MANAGEMENT | Age: 85
End: 2025-02-06

## 2025-02-06 LAB
BLOOD TYPE BARCODE: 7300
UNIT VOLUME: 350 ML

## 2025-02-06 NOTE — PROGRESS NOTES
DM / Neuro/Stroke confirming Neurology appointment (discharged 02/05) - patient discharged to [facility]    DM - patient discharged to [facility])    Dr Sara Pinto  Neurology  [address] [facility] [address]  [place] [address]  [phone]  Confirming appointment; patient has existing appointment Thu 02/20 @11:40am - patient's daughter advised this appointment wouldn't work because she is in rehab.  Apt made:  Tue 03/11 @1:00pm w/Dr Joe Kang  [address] [facility] [facility] [address]  [place] [address]  Confirmed w/pt daughterBrea  Closing encounter

## 2025-02-10 NOTE — PAYOR COMM NOTE
--------------  DISCHARGE REVIEW    Payor: TRINO ARREGUIN [facility]  Subscriber #:  [id number]  Authorization Number: [id number]    Admit date: 25  Admit time:   2:24 AM  Discharge Date: 2025  3:25 PM     Admitting Physician: Pooja Ellis MD  Attending Physician:  No att. providers found  Primary Care Physician: Heike Sorto MD          Discharge Summary Notes        Discharge Summary signed by Casa Santos MD at 2025  5:16 PM       Author: Casa Santos MD Specialty: HOSPITALIST Author Type: Physician    Filed: 2025  5:16 PM Date of Service: 2025  2:53 PM Status: Signed    : Casa Santos MD (Physician)         [facility]  part of formerly [facility]    Discharge Summary    Chester Bautista Patient Status:  Inpatient    1940 MRN [id number]   Location [facility] 2/ Attending Casa Santos MD   Hosp Day # 18 PCP Heike Sorto MD     Date of Admission: 2025 Disposition: Acute Rehab     Date of Discharge: 25      Admitting Diagnosis: Dizziness [R42]  Right hand paresthesia [R20.2]  Bilateral pulmonary embolism (HCC) [I26.99]  Acute cystitis without hematuria [N30.00]  Thrombus of aorta (HCC) [I74.10]  Cerebellar stroke (HCC) [I63.9]    Hospital Discharge Diagnoses:  CVA    Lace+ Score: 86  59-90 High Risk  29-58 Medium Risk  0-28   Low Risk.    TCM Follow-Up Recommendation:  LACE > 58: High Risk of readmission after discharge from the hospital.      Problem List:   Patient Active Problem List   Diagnosis    Adult general medical examination    Vaccine counseling    Colon cancer screening    Type 2 diabetes mellitus without retinopathy (HCC)    Sarcoidosis    Anemia, iron deficiency    Dyslipidemia    Hypercalcemia    Hypertension    Infiltrating ductal carcinoma of right breast, stage 1 (HCC)    Rheumatoid arthritis (HCC)    Osteoarthrosis    Peripheral vascular disease due to secondary diabetes mellitus (HCC)    Neuropathy     AVM (arteriovenous malformation) of duodenum, acquired    Pseudophakia of both eyes    Vitreous floaters of both eyes    Glaucoma suspect of both eyes    Iron deficiency anemia due to chronic blood loss    Malabsorption of iron (HCC)    Port-A-Cath in place    Chronic obstructive pulmonary disease, unspecified (HCC)    Adnexal mass    Encounter for care related to vascular access port    High risk medication use    Meibomian gland dysfunction (MGD) of both eyes    Hypokalemia    Hyperkalemia    Hypomagnesemia    Constipation    Gastric AVM    AVM (arteriovenous malformation) of small bowel, acquired with hemorrhage    Leukocytosis    Presence of IVC filter    CKD (chronic kidney disease) stage 3, GFR 30-59 ml/min (HCC)    Dry eye syndrome of both eyes    Vitamin D deficiency    Cystic thyroid nodule    Cough    Chronic fatigue    Cerebellar stroke (HCC)    Dizziness    Right hand paresthesia    Thrombus of aorta (HCC)    Bilateral pulmonary embolism (HCC)    Acute cystitis without hematuria    PE (pulmonary thromboembolism) (HCC)    Aortic thrombus (HCC)    New cerebellar infarct (HCC)    Small bowel bleed not requiring more than 4 units of blood in 24 hours, ICU, or surgery    Acute blood loss anemia    Cerebrovascular accident (CVA) due to embolism of precerebral artery (HCC)    Melena    Visual hallucinations    Goals of care, counseling/discussion    Palliative care encounter       Reason for Admission:   Anemia  Left Cerebellar Infarcts  SAH  Dizziness  PE   Descending aortic arch thrombus   HTN  Klebsiella UTI  Constipation  HTN  HLD  DM  COPD/?sarcoidosis   Rheumatoid Arthritis  DVT/PE s/p IVC which has been removed   CKD3  Anemia - in setting of bleeding AVMs; getting iron infusions   Hx of Breast Cancer                 -port changed via IR  Physical Exam:   General appearance: alert, appears stated age and cooperative  Pulmonary:  clear to auscultation bilaterally  Cardiovascular: S1, S2 normal, no murmur,  click, rub or gallop, regular rate and rhythm  Abdominal: soft, non-tender; bowel sounds normal; no masses,  no organomegaly  Extremities: extremities normal, atraumatic, no cyanosis or edema  Psychiatric: calm      History of Present Illness:   Per Dr. Milan Briggs Renée Bautista is a 84 year old female with HTN, HLD, DM, COPD, arthritis, hx of prior DVT PE s/p IVC filter, and Anemia who presented with room spinning since 1400 today.  States was sitting on couch talking on the phone when the dizziness started, with associated emesis X1.  Thereafter she started having some right shoulder pain and right arm tingling.  Daughter at bedside states mom can do basic household tasks however is otherwise sedentary laying on bed.  Last month on  family had a long car ride to [place] to attend a .                 In ED initial /83 with WBC 11.2 with left shift, UA with signs of UTI.  Stroke alert called, found to have 2 small cerebellar infarcts on her MRI and large mural thrombus in descending aorta, per radiology it is chronic though patient stated she was not aware of it. Also found to have small subsegmental PEs on CTA Chest. She took 3 baby aspirins prior to arrival. Otherwise she is not on any anticoagulation at home due to prior severe bleeding and GI AVMs. Neurology and vascular surgery consulted. Patient given meclizine for dizziness, and labetalol for HTN.  Admitted for further management.     Hospital Course:   Anemia  -in the setting of PE and aortic arch thrombus, the decision was made to resume anticoagulation despite the patients history of bleeding AVMs  -monitor H&H, transfuse as indicated  -GI on consult                Start octreotide 50 mcg TID  Continue Brilinta as the benefit of AC are >> risks of bleeding  Family declined endoscopic eval also declined transfer to a tertiary care center for balloon enteroscopy and colonoscopy  Risk-benefit discussed continue on aspirin  81 mg daily but no other blood thinners risks and benefits discussed in detail  -transfused pRBC (total 2U pRBC and 1U platelets since admission)  -d/w patient and GI  -Would not want endoscopic evaluations given the rapidity that her AVMs recur seen by hematology appreciate their evaluation  -Plan close outpatient follow-up and serial transfusions at this juncture.  -d/w neurology and vascular surgery  -plan asa 81 mg daily, will not plan brillinta, due to risk, d/w patient  -Resume Depo octreotide on discharge home from rehab  -Patient has to follow-up with Dr. Mohamud for monitoring of hemoglobin and blood transfusions        #Left Cerebellar Infarcts  #SAH  - pt with new sudden onset neuro changes  - 2 small cerebellar infarcts seen on admission MRI  -Patient already took 3 baby aspirin's prior to coming to ED   - CTA negative for LVO, but showing small subsegmental Pes  - Stroke alert called 1/19 -> stroke CT non-acute, MRI showing small CVA in L thalamus   - Neuro on consult  -Continue statin  -high risk morbidity/mortality given her history of GI bleed and now need for antiplatelet therapy and potential anticoagulation in the future. Family is aware  -gradually lower BP  -Vascular surgery on consult  -s/p stent placement 10/24/2025  -off brillinta-->d/w vascular surgery ok with asa 81mg  -CT head reviewed, repeat CT pending  -Appreciate further neuro and vascular surgery input  -continue present management  -d/w neuro and vascular surgery, plan asa 81 mg daily     #Dizziness  Orthostatic hypotension  Improved   -Likely in setting of above  -Meclizine prn   -Neurology consultation appreciated  -Continue carvedilol will stop losartan with lower blood pressure     #PE   -Patient is hemodynamically stable, EKG NSR and troponin not elevated.  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 Already took 3 baby aspirins prior to admission  -Previously had IVC filter placed in March 2024 for PE at that time (removed June  2024).   -Due to bleeding risk now has a permanent IVC filter  -Continue aspirin 81 mg daily  2/1 : s/p IVC filter on 1/31      #Descending aortic arch thrombus   -Admission CTA chest revealing mural thrombus throughout the thoracic and abdominal aorta, chronic per radiology  -Vascular surgery on consult  -s/p angiogram 01/24/2025  -cont statin for now   -Aspirin daily     #HTN  - decrease antihypertensives as bp on lower side  -home meds diovan 40mg bid and carvedilol 12.5 bid  -plan losartan 25mg daily and carvdilol 12.5 bid        #Klebsiella UTI  -nitrofurantoin initiated in ED, UCx reviewed - nitrofurantoin resistant  -started on IV Cefepime - completed course of treatment     #Constipation  -bowel regimen ordered      Chronic Medical Problems  HTN  HLD  DM  COPD/?sarcoidosis   Rheumatoid Arthritis  DVT/PE s/p IVC which has been removed   CKD3  Anemia - in setting of bleeding AVMs; getting iron infusions   Hx of Breast Cancer                 -port changed via IR    Consultations:   Neurology  Pulmonology  Palliative care  Hematology  Gastroenterology  PMR    Procedures:   -s/p stent placement 1/24/2025  -Status post IVC filter on 1/31/2025  Complications: n/a    Discharge Condition: Good    Discharge Medications:      Discharge Medications        START taking these medications        Instructions Prescription details   aspirin 81 MG Tbec  Start taking on: February 6, 2025      Take 1 tablet (81 mg total) by mouth daily.   Refills: 0     insulin aspart 100 Units/mL Sopn  Commonly known as: NovoLOG      Inject 1-7 Units into the skin 3 (three) times daily with meals.   Refills: 0     insulin degludec 100 units/mL Sopn  Commonly known as: Tresiba      Inject 10 Units into the skin nightly.   Refills: 0     lidocaine-menthol 4-1 % Ptch  Start taking on: February 6, 2025      Place 1 patch onto the skin daily.   Refills: 0     meclizine 12.5 MG Tabs  Commonly known as: Antivert      Take 1 tablet (12.5 mg total) by  mouth 3 (three) times daily as needed for Dizziness.   Refills: 0     pantoprazole 40 MG Tbec  Commonly known as: Protonix      Take 1 tablet (40 mg total) by mouth 2 (two) times daily before meals.   Refills: 0     sennosides 8.6 MG Tabs  Commonly known as: Senokot      Take 1 tablet (8.6 mg total) by mouth 2 (two) times daily.   Refills: 0            CHANGE how you take these medications        Instructions Prescription details   acetaminophen 500 MG Tabs  Commonly known as: Tylenol Extra Strength  What changed: Another medication with the same name was changed. Make sure you understand how and when to take each.      Take 1 tablet (500 mg total) by mouth daily.   Refills: 0     acetaminophen 325 MG Tabs  Commonly known as: Tylenol  What changed:   when to take this  reasons to take this      Take 2 tablets (650 mg total) by mouth every 6 (six) hours as needed.   Refills: 0     albuterol 108 (90 Base) MCG/ACT Aers  Commonly known as: Ventolin HFA  What changed: Another medication with the same name was removed. Continue taking this medication, and follow the directions you see here.      TAKE 2 PUFFS BY MOUTH EVERY 4 TO 6 HOURS AS NEEDED   Quantity: 6.7 each  Refills: 5     albuterol (2.5 MG/3ML) 0.083% Nebu  Commonly known as: Ventolin  What changed: Another medication with the same name was removed. Continue taking this medication, and follow the directions you see here.      Take 3 mL (2.5 mg total) by nebulization every 6 (six) hours as needed for Wheezing.   Quantity: 360 mL  Refills: 3     Vitamin D3 1.25 MG (12303 UT) Caps  What changed: additional instructions      TAKE 1 CAPSULE BY MOUTH EVERY 2 WEEKS   Quantity: 6 capsule  Refills: 1            CONTINUE taking these medications        Instructions Prescription details   BD Pen Needle Tata 2nd Gen 32G X 4 MM Misc  Generic drug: Insulin Pen Needle      1 EACH DAILY. USE DAILY WITH INSULIN   Quantity: 100 each  Refills: 1     Blood Gluc Meter Disp-Strips  Nica      Dx. E11.9. Checks qam.   Quantity: 100 each  Refills: 0     carvedilol 12.5 MG Tabs  Commonly known as: Coreg      Take 1 tablet (12.5 mg total) by mouth 2 (two) times daily with meals.   Quantity: 180 tablet  Refills: 3     cyclobenzaprine 10 MG Tabs  Commonly known as: Flexeril      Take 1 tablet (10 mg total) by mouth nightly as needed.   Quantity: 90 tablet  Refills: 1     fluticasone-salmeterol 250-50 MCG/ACT Aepb  Commonly known as: Wixela Inhub      Inhale 1 puff into the lungs every 12 (twelve) hours.   Quantity: 1 each  Refills: 5     gabapentin 100 MG Caps  Commonly known as: Neurontin      Take 1 capsule (100 mg total) by mouth nightly.   Quantity: 90 capsule  Refills: 3     glipiZIDE 5 MG Tabs  Commonly known as: Glucotrol      Take 1 tablet (5 mg total) by mouth every morning before breakfast.   Quantity: 90 tablet  Refills: 0     hydroxychloroquine 200 MG Tabs  Commonly known as: Plaquenil      Take 1.5 tablets (300 mg total) by mouth daily.   Quantity: 135 tablet  Refills: 1     ipratropium 0.02 % Soln  Commonly known as: Atrovent      Take 2.5 mL (500 mcg total) by nebulization 2 (two) times daily.   Quantity: 180 each  Refills: 2     loratadine 10 MG Tabs  Commonly known as: Claritin      TAKE 1 TABLET BY MOUTH EVERY DAY   Quantity: 30 tablet  Refills: 1     magnesium oxide 400 MG Tabs  Commonly known as: Mag-Ox      Take 1 tablet (400 mg total) by mouth in the morning, at noon, and at bedtime. Per Dr. JENSEN Mata   Refills: 0     montelukast 10 MG Tabs  Commonly known as: Singulair      Take 1 tablet (10 mg total) by mouth nightly.   Quantity: 90 tablet  Refills: 3     OneTouch Delica Lancets 33G Misc      4 x daily   Quantity: 100 each  Refills: 1     Accu-Chek FastClix Lancets Misc      Check sugar once daily as directed (E11.42)   Quantity: 100 each  Refills: 2     OneTouch Verio w/Device Kit      1 each daily. Test 1x daily   Quantity: 1 kit  Refills: 0     Blood Glucose System Adrian Kit       Dx. E11.9. Checks qam.   Quantity: 1 kit  Refills: 0     Blood Glucose Monitoring Suppl w/Device Kit      Dx. E11.9. Checks qam.   Quantity: 1 kit  Refills: 0     predniSONE 5 MG Tabs  Commonly known as: Deltasone      Take 1 tablet (5 mg total) by mouth daily.   Quantity: 90 tablet  Refills: 1     rosuvastatin 20 MG Tabs  Commonly known as: Crestor      Take 1 tablet (20 mg total) by mouth nightly.   Quantity: 90 tablet  Refills: 3            STOP taking these medications      ciprofloxacin 500 MG Tabs  Commonly known as: Cipro        doxycycline 100 MG Caps  Commonly known as: Vibramycin        famotidine 20 MG Tabs  Commonly known as: Pepcid        Lantus SoloStar 100 UNIT/ML Sopn  Generic drug: insulin glargine        valsartan 80 MG Tabs  Commonly known as: Diovan               ASK your doctor about these medications        Instructions Prescription details   Potassium Chloride ER 20 MEQ Tbcr      Take 40 mEq by mouth every morning.   Quantity: 60 tablet  Refills: 1               Where to Get Your Medications        These medications were sent to [facility]/pharmacy #[id number] - [place], [place] - [address] [phone], [phone]  [address] [facility] [address]      Phone: [phone]   Vitamin D3 1.25 MG (42443 UT) Caps         Follow up Visits: Follow-up with pcp in 1 week    Follow up Labs: hb with hematology     Other Discharge Instructions: n/a    VIDYA CORDOBA MD  2/5/2025  2:53 PM    > 35 min     Electronically signed by Vidya Cordoba MD on 2/5/2025  5:16 PM         REVIEWER COMMENTS

## 2025-02-12 RX ORDER — HYDROXYCHLOROQUINE SULFATE 200 MG/1
200 TABLET, FILM COATED ORAL DAILY
Qty: 90 TABLET | Refills: 0 | OUTPATIENT
Start: 2025-02-12

## 2025-02-12 NOTE — TELEPHONE ENCOUNTER
Outpatient Medication Detail     Disp Refills Start End    hydroxychloroquine 200 MG Oral Tab 135 tablet 1 12/3/2024 --    Sig - Route: Take 1.5 tablets (300 mg total) by mouth daily. - Oral    Sent to pharmacy as: Hydroxychloroquine Sulfate 200 MG Oral Tablet (Plaquenil)    E-Prescribing Status: Receipt confirmed by pharmacy (12/3/2024  4:33 PM CST)      Pharmacy    [facility]Y #[id number] - [place], [place] - [facility] [address] [facility] [phone], [phone]     
no

## 2025-02-13 ENCOUNTER — TELEPHONE (OUTPATIENT)
Dept: ENDOCRINOLOGY CLINIC | Facility: CLINIC | Age: 85
End: 2025-02-13

## 2025-02-13 NOTE — TELEPHONE ENCOUNTER
Caren CAZARES    Called [facility] at [phone], spoke to PSR. Nurse is not available but will call back at priority line.     Spoke to Jaycee LAMBERT. Patient is expected to be discharged on 2/23. RN provided new instructions below. Will wait for call back from nurse to inquire about eating habits and to request update on Monday 2/17.    Spoke to daughter. She reports that she wasn't eating at the hospital too well and at the rehab they are \"letting her have at it\" because her appetite has been so poor. Daughter is not sure if patient is snacking in between meals and if her readings are in fact before meals.     Appointment scheduled with CDE on 3/38/25 for sooner visit. RN advised to put dexcom on after discharge to have data available at appointment.

## 2025-02-13 NOTE — TELEPHONE ENCOUNTER
Caren KHAN,    Called Jaycee LAMBERT from [facility]. Reports persistent elevated blood sugars recently. She is on  prednisone and was wondering if meal time insulin would be appropriate.    Reports current regimen as:    Glipizide 5 mg once daily  Lantus 15 units every night    She is no longer using the continues glucose monitor. Has significant memory issues since after stroke.     Reports recent blood sugars before meals:    2/13 Fasting 100 before lunch 258  2/12                     before lunch 308 before dinner 299   2/11                                                  before dinner 205     Can call Jaycee LAMBERT back with instructions, she will be available until 530 pm today.      Patient has a follow up appointment scheduled on 4/10/25. Last appointment was on 1/16/25. Please advise if ok to keep appointment.

## 2025-02-13 NOTE — TELEPHONE ENCOUNTER
Jaycee LAMBERT calling regards to patient's diabetes regimen. States resumed glipizide and taking Lantus but is still having uncontrolled sugars.  Asking if there is other oral agents or alternatives before meal time insulin. Please call.

## 2025-02-13 NOTE — TELEPHONE ENCOUNTER
Update noted.     Agree with CDE apt on 2/28.     Will await BG readings on Monday for further recommendations.     Thank you!

## 2025-02-13 NOTE — TELEPHONE ENCOUNTER
Update noted. Please clarify her eating patterns (times and how much she has been eating ex: most/half or little) and how long is she expected to be at rehab.     She should be seen sooner by me or CDE once she is discharged from rehab (acute setting).     Please instruct RN staff to increase glipizide to 10mg daily with first meal and decrease Lantus to 12 units daily in AM (instead of PM). They can hold hold PM dose tonight and administering tomorrow morning.     Please ask RN staff to give me another update on her glucose readings on Monday 2/17.       Thank you!

## 2025-03-03 ENCOUNTER — TELEPHONE (OUTPATIENT)
Facility: CLINIC | Age: 85
End: 2025-03-03

## 2025-03-03 DIAGNOSIS — Q27.30 AVM (ARTERIOVENOUS MALFORMATION) (HCC): Primary | ICD-10-CM

## 2025-03-03 NOTE — TELEPHONE ENCOUNTER
Dr. Garcia    I spoke to Prudencio's daughter.  She is asking about getting her set up for the monthly octreotide injections again.  She was on these prior to her hospitalization at the infusion center.    She was getting them TID subcutaneous in the hospital and in rehab, but is not getting any right now.    Daughter asking to get them ordered at the infusion center since she does not feel comfortable using the long needle required for intramuscular injections.    I pended therapy plan for the 20 mg since that is what her last therapy plan had, but unsure what does you want.    Daughter said she just had a blood transfusion because her hemoglobin was 6.    Please advise    Thank you

## 2025-03-03 NOTE — TELEPHONE ENCOUNTER
Patient's daughter called to speak with a nurse in regards to the once a month injection. Please call.

## 2025-03-05 RX ORDER — OCTREOTIDE ACETATE 20 MG
20 KIT INTRAMUSCULAR ONCE
Status: CANCELLED | OUTPATIENT
Start: 2025-03-05

## 2025-03-05 NOTE — TELEPHONE ENCOUNTER
Managed Care    Please assist with prior authorization for the Octreotide injections at the infusion center.  Patient had a stroke so cannot self administer and daughter cannot either.    Thank you

## 2025-03-06 ENCOUNTER — MED REC SCAN ONLY (OUTPATIENT)
Dept: INTERNAL MEDICINE CLINIC | Facility: CLINIC | Age: 85
End: 2025-03-06

## 2025-03-06 PROBLEM — Z86.73 HISTORY OF EMBOLIC STROKE: Status: ACTIVE | Noted: 2025-03-06

## 2025-03-06 PROBLEM — Z86.718 HISTORY OF DVT (DEEP VEIN THROMBOSIS): Status: ACTIVE | Noted: 2025-03-06

## 2025-03-07 RX ORDER — FLUTICASONE PROPIONATE AND SALMETEROL 250; 50 UG/1; UG/1
1 POWDER RESPIRATORY (INHALATION) EVERY 12 HOURS SCHEDULED
Qty: 60 EACH | Refills: 3 | Status: SHIPPED | OUTPATIENT
Start: 2025-03-07

## 2025-03-10 ENCOUNTER — OFFICE VISIT (OUTPATIENT)
Dept: PULMONOLOGY | Facility: CLINIC | Age: 85
End: 2025-03-10

## 2025-03-10 VITALS
WEIGHT: 134.81 LBS | HEART RATE: 93 BPM | RESPIRATION RATE: 16 BRPM | SYSTOLIC BLOOD PRESSURE: 156 MMHG | DIASTOLIC BLOOD PRESSURE: 84 MMHG | OXYGEN SATURATION: 98 % | BODY MASS INDEX: 25.45 KG/M2 | HEIGHT: 61 IN

## 2025-03-10 DIAGNOSIS — I26.99 BILATERAL PULMONARY EMBOLISM (HCC): Primary | ICD-10-CM

## 2025-03-10 PROCEDURE — 3077F SYST BP >= 140 MM HG: CPT | Performed by: INTERNAL MEDICINE

## 2025-03-10 PROCEDURE — 3079F DIAST BP 80-89 MM HG: CPT | Performed by: INTERNAL MEDICINE

## 2025-03-10 PROCEDURE — 1126F AMNT PAIN NOTED NONE PRSNT: CPT | Performed by: INTERNAL MEDICINE

## 2025-03-10 PROCEDURE — 99213 OFFICE O/P EST LOW 20 MIN: CPT | Performed by: INTERNAL MEDICINE

## 2025-03-10 PROCEDURE — 3008F BODY MASS INDEX DOCD: CPT | Performed by: INTERNAL MEDICINE

## 2025-03-10 NOTE — PROGRESS NOTES
The patient is an 85-year-old female who I know well from prior evaluation comes in now for follow-up.  In general, she is doing well.  She had multiple embolic strokes with thrombus identified at the aortic arch now status post aortic stenting.  She was placed on blood thinner but unfortunately had significant bleeding through multiple AVMs and the blood thinner had to be stopped although she remains on baby aspirin.  She has a history of venous thromboembolism and has IVC filter in place.  She recently got blood transfusion.  Hemoglobin now 8.1.  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  She had a Klebsiella UTI now off antibiotic.    Review of Systems:  Vision normal. Ear nose and throat normal. Bowel normal. Bladder function normal. No depression. No thyroid disease. No lymphatic system concerns.  No rash. Muscles and joints unremarkable. No weight loss no weight gain.    Physical Examination:  Vital signs normal. HEENT examination is unremarkable with pupils equal round and reactive to light and accommodation. Neck without adenopathy, thyromegaly, JVD nor bruit. Lungs clear to auscultation and percussion. Cardiac regular rate and rhythm no murmur. Abdomen nontender, without hepatosplenomegaly and no mass appreciable. Extremities and Musculoskeletal without clubbing cyanosis nor edema, and mobility limited with walker. Neurologic grossly intact with symmetric tone and strength and reflex.    Assessment and plan:  1.  Multiple strokes with history of aortic arch thrombus status post stenting  2.  AVMs with history of recent GI bleed necessitating ICU management and transfusion.  Recent hemoglobin 8.3.  To follow-up with GI and hematology.  3.  Respiratory status-combined pulmonary fibrosis and emphysema-doing well clinically.  Continue current management with Advair.  4.  History of Klebsiella UTI  5.  Left groin  hematoma-clinically resolved  6.  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

## 2025-03-12 NOTE — TELEPHONE ENCOUNTER
Managed care    Daughter is following up  Has this request been submitted for coverage of the Octreotide injections at the infusion center?  Patient had recent stroke and unable to give to self.    Thank you

## 2025-03-13 ENCOUNTER — TELEPHONE (OUTPATIENT)
Dept: PULMONOLOGY | Facility: CLINIC | Age: 85
End: 2025-03-13

## 2025-03-13 ENCOUNTER — NURSE ONLY (OUTPATIENT)
Age: 85
End: 2025-03-13
Attending: INTERNAL MEDICINE
Payer: MEDICARE

## 2025-03-13 DIAGNOSIS — D64.9 ANEMIA, UNSPECIFIED TYPE: Primary | ICD-10-CM

## 2025-03-13 DIAGNOSIS — E87.6 HYPOKALEMIA: ICD-10-CM

## 2025-03-13 DIAGNOSIS — M05.79 RHEUMATOID ARTHRITIS INVOLVING MULTIPLE SITES WITH POSITIVE RHEUMATOID FACTOR (HCC): ICD-10-CM

## 2025-03-13 DIAGNOSIS — Z51.81 MEDICATION MONITORING ENCOUNTER: ICD-10-CM

## 2025-03-13 DIAGNOSIS — D64.9 ANEMIA, UNSPECIFIED TYPE: ICD-10-CM

## 2025-03-13 DIAGNOSIS — E83.42 HYPOMAGNESEMIA: ICD-10-CM

## 2025-03-13 DIAGNOSIS — D50.9 IRON DEFICIENCY ANEMIA, UNSPECIFIED IRON DEFICIENCY ANEMIA TYPE: Primary | ICD-10-CM

## 2025-03-13 DIAGNOSIS — Q27.30 AVM (ARTERIOVENOUS MALFORMATION) (HCC): ICD-10-CM

## 2025-03-13 DIAGNOSIS — E55.9 VITAMIN D DEFICIENCY: ICD-10-CM

## 2025-03-13 LAB
ALBUMIN SERPL-MCNC: 3.6 G/DL (ref 3.2–4.8)
ALT SERPL-CCNC: 14 U/L
ANION GAP SERPL CALC-SCNC: 6 MMOL/L (ref 0–18)
AST SERPL-CCNC: 26 U/L (ref ?–34)
BASOPHILS # BLD AUTO: 0.03 X10(3) UL (ref 0–0.2)
BASOPHILS NFR BLD AUTO: 0.3 %
BUN BLD-MCNC: 22 MG/DL (ref 9–23)
BUN/CREAT SERPL: 18.2 (ref 10–20)
CALCIUM BLD-MCNC: 9.1 MG/DL (ref 8.7–10.4)
CHLORIDE SERPL-SCNC: 105 MMOL/L (ref 98–112)
CO2 SERPL-SCNC: 26 MMOL/L (ref 21–32)
CREAT BLD-MCNC: 1.21 MG/DL
CRP SERPL-MCNC: 1.6 MG/DL (ref ?–1)
DEPRECATED HBV CORE AB SER IA-ACNC: 42 NG/ML
DEPRECATED RDW RBC AUTO: 49.5 FL (ref 35.1–46.3)
EGFRCR SERPLBLD CKD-EPI 2021: 44 ML/MIN/1.73M2 (ref 60–?)
EOSINOPHIL # BLD AUTO: 0.06 X10(3) UL (ref 0–0.7)
EOSINOPHIL NFR BLD AUTO: 0.6 %
ERYTHROCYTE [DISTWIDTH] IN BLOOD BY AUTOMATED COUNT: 16.4 % (ref 11–15)
ERYTHROCYTE [SEDIMENTATION RATE] IN BLOOD: 56 MM/HR
GLUCOSE BLD-MCNC: 239 MG/DL (ref 70–99)
HCT VFR BLD AUTO: 21.9 %
HGB BLD-MCNC: 6.8 G/DL
IMM GRANULOCYTES # BLD AUTO: 0.05 X10(3) UL (ref 0–1)
IMM GRANULOCYTES NFR BLD: 0.5 %
IRON SATN MFR SERPL: 5 %
IRON SERPL-MCNC: 14 UG/DL
LYMPHOCYTES # BLD AUTO: 0.63 X10(3) UL (ref 1–4)
LYMPHOCYTES NFR BLD AUTO: 6.5 %
MAGNESIUM SERPL-MCNC: 1.6 MG/DL (ref 1.6–2.6)
MCH RBC QN AUTO: 26.1 PG (ref 26–34)
MCHC RBC AUTO-ENTMCNC: 31.1 G/DL (ref 31–37)
MCV RBC AUTO: 83.9 FL
MONOCYTES # BLD AUTO: 0.37 X10(3) UL (ref 0.1–1)
MONOCYTES NFR BLD AUTO: 3.8 %
NEUTROPHILS # BLD AUTO: 8.57 X10 (3) UL (ref 1.5–7.7)
NEUTROPHILS # BLD AUTO: 8.57 X10(3) UL (ref 1.5–7.7)
NEUTROPHILS NFR BLD AUTO: 88.3 %
OSMOLALITY SERPL CALC.SUM OF ELEC: 295 MOSM/KG (ref 275–295)
PLATELET # BLD AUTO: 273 10(3)UL (ref 150–450)
POTASSIUM SERPL-SCNC: 4 MMOL/L (ref 3.5–5.1)
RBC # BLD AUTO: 2.61 X10(6)UL
SODIUM SERPL-SCNC: 137 MMOL/L (ref 136–145)
TOTAL IRON BINDING CAPACITY: 293 UG/DL (ref 250–425)
TRANSFERRIN SERPL-MCNC: 227 MG/DL (ref 250–380)
VIT D+METAB SERPL-MCNC: 29.8 NG/ML (ref 30–100)
WBC # BLD AUTO: 9.7 X10(3) UL (ref 4–11)

## 2025-03-13 RX ORDER — OCTREOTIDE ACETATE 20 MG
20 KIT INTRAMUSCULAR ONCE
Status: COMPLETED | OUTPATIENT
Start: 2025-03-13 | End: 2025-03-13

## 2025-03-13 RX ORDER — OCTREOTIDE ACETATE 20 MG
20 KIT INTRAMUSCULAR ONCE
OUTPATIENT
Start: 2025-04-10

## 2025-03-13 RX ADMIN — OCTREOTIDE ACETATE 20 MG: 20 MG KIT INTRAMUSCULAR at 14:57:00

## 2025-03-13 NOTE — TELEPHONE ENCOUNTER
Incoming call from Dr. Parker-order 1 unit packed red blood cells and CBC a day after infusion.     *spoke to Zoila at infusion center and form faxed for completion. Form completed and given to Dr. Parker for further completion and signature.   Form faxed to [phone], confirmation received. Labs, office visit and demographics also faxed.     Message left for patient.

## 2025-03-14 ENCOUNTER — NURSE ONLY (OUTPATIENT)
Age: 85
End: 2025-03-14
Attending: INTERNAL MEDICINE
Payer: MEDICARE

## 2025-03-14 ENCOUNTER — TELEPHONE (OUTPATIENT)
Age: 85
End: 2025-03-14

## 2025-03-14 DIAGNOSIS — D50.9 IRON DEFICIENCY ANEMIA, UNSPECIFIED: Primary | ICD-10-CM

## 2025-03-14 LAB
ANTIBODY SCREEN: NEGATIVE
RH BLOOD TYPE: POSITIVE

## 2025-03-14 RX ORDER — FERROUS SULFATE 325(65) MG
325 TABLET ORAL
Qty: 90 TABLET | Refills: 1 | Status: SHIPPED | OUTPATIENT
Start: 2025-03-14

## 2025-03-14 NOTE — TELEPHONE ENCOUNTER
RN, please facilitate at the same time that she gets her blood transfusion that she also gets an infusion of Ferrlecit 250 mg over 2 hours.  Let her know that she also has severe iron deficiency.  She should also start taking iron supplementation 325 mg ferrous gluconate daily.  Please repeat a hemoglobin, ferritin, iron and TIBC at the 2-week interval.     Orders placed. Several calls to infusion center and outpatient pharmacy to verify dosage of ferrlecit.  Ferrlicit can be given after RBC infusion but not simultaneously. Order faxed to infusion center and confirmation received.     Spoke to patients daughter and lab orders, infusion and ferrous sulfate tabs discussed. Patient plans on going in today for infusion.

## 2025-03-14 NOTE — TELEPHONE ENCOUNTER
RN called and spoke to pt and her daughter. They prefer to continue to get octreotide injections at the infusion department as they have been, declining home injections.

## 2025-03-14 NOTE — TELEPHONE ENCOUNTER
Per Mary Alice @ Infusion Scheduling ([id number]) orders sent over, no availability to do blood transfusion & Iron infusion today, daughter is requesting to have as soon as possible, opening on Monday, but Brea/daughter doesn't think patient can wait that long because her blood count is low. Nick Yancey (RN) will follow up.

## 2025-03-14 NOTE — TELEPHONE ENCOUNTER
Pt daughter is calling to schedule infusion for patient. She states Dr. Parker spoke with someone from the office already regarding scheduling.       Please contact Brea for scheduling.

## 2025-03-14 NOTE — TELEPHONE ENCOUNTER
Spoke to transfusion center-no availability until Monday 3/17/2025.     Spoke to Dr. Parker-Ok to transfuse Monday if patient is feeling OK. Spoke to daughter, Brea, and message relayed. Daughter concerned as patients hands and tingling. Informed her they could go to the ED for immediate treatment. Daughter stated she will she how the patient is feeling-accepted appointment Monday.    Spoke to infusion center again-patient must get type and screen this weekend. Monday 8:30 am appointment scheduled for infusion.     Spoke to daughter and informed her blood test must be done this weekend. Patient has a port so lab drawn will have to be done at cancer center. Daughter agreed to call [facility] for lab draw.

## 2025-03-17 ENCOUNTER — OFFICE VISIT (OUTPATIENT)
Age: 85
End: 2025-03-17
Attending: INTERNAL MEDICINE
Payer: MEDICARE

## 2025-03-17 VITALS
OXYGEN SATURATION: 98 % | DIASTOLIC BLOOD PRESSURE: 52 MMHG | WEIGHT: 139.81 LBS | SYSTOLIC BLOOD PRESSURE: 131 MMHG | HEART RATE: 88 BPM | BODY MASS INDEX: 26 KG/M2 | RESPIRATION RATE: 16 BRPM | TEMPERATURE: 99 F

## 2025-03-17 DIAGNOSIS — D50.9 IRON DEFICIENCY ANEMIA, UNSPECIFIED: ICD-10-CM

## 2025-03-17 DIAGNOSIS — K55.21 AVM (ARTERIOVENOUS MALFORMATION) OF SMALL BOWEL, ACQUIRED WITH HEMORRHAGE: ICD-10-CM

## 2025-03-17 DIAGNOSIS — Z45.2 ENCOUNTER FOR CARE RELATED TO VASCULAR ACCESS PORT: Primary | ICD-10-CM

## 2025-03-17 DIAGNOSIS — D50.0 IRON DEFICIENCY ANEMIA DUE TO CHRONIC BLOOD LOSS: ICD-10-CM

## 2025-03-17 DIAGNOSIS — K90.9 MALABSORPTION OF IRON (HCC): ICD-10-CM

## 2025-03-17 RX ORDER — SODIUM CHLORIDE 9 MG/ML
10 INJECTION, SOLUTION INTRAMUSCULAR; INTRAVENOUS; SUBCUTANEOUS ONCE
OUTPATIENT
Start: 2025-03-17

## 2025-03-17 NOTE — PROGRESS NOTES
Pt here for blood transfusion. To receive 1 unit(s).  Most current hgb level 6.8 E-consent completed by this nurse.    Appeared to tolerate treatment well.  No S/S of adverse rxn noted at this time.    Ferrlecit x1 dose given today as well as ordered.    Discharged from infusion, Ambulating independently    Has future appointments already scheduled     Ordering MD: Elena        Education Record  Learner:  Patient and Family Member  Barriers / Limitations:  None  Method:  Discussion  General Topics:  Procedure and Plan of care reviewed  Outcome:  Shows understanding

## 2025-03-17 NOTE — PATIENT INSTRUCTIONS
Post Transfusion Instructions for Out-Patients    Most recipients of blood transfusions do not experience any adverse effects.  You may resume your normal activities 4 to 6 hours after your blood transfusion.  Occasionally, reactions of blood transfusions may be delayed (for several weeks).    Symptoms of a transfusion reaction can include:    Faintness  Weakness  Nausea  Vomiting  Shortness of breath  Chest pain  Back pain  Hives  Rash  Itch  Flushing  Fever  Chills  Jaundice (yellowish tint to eyes/skin)  Dark or red urine    Though these symptoms may not be related to the blood transfusions, they should be reported to your physician.  Contact both your physician and the laboratory Blood Bank (see information below) so that your symptoms can be thoroughly evaluated.    Your physician's name: Elena Heath  Your physician's phone number: [phone]    If your physician is unavailable, contact the Emergency Department.    [facility] Blood [facility]: [phone]  [facility] Emergency Department: [phone]

## 2025-03-18 NOTE — TELEPHONE ENCOUNTER
Good Afternoon    Managed Care does not obtain  prior auth for RX or injections.    Requesting [facility] office would be responsible to obtain prior auth for injections.    Thank you    Dominga  [facility] [place]

## 2025-03-19 NOTE — TELEPHONE ENCOUNTER
Managed Care    This is an injection through the infusion center entered as a therapy plan.  Prior authorization team does these.  It is through medical benefit   Rns do the prior authorizations for at home injectables through pharmacy benefit which is different, this is not an at home injection.    Thank you

## 2025-03-19 NOTE — TELEPHONE ENCOUNTER
Prior authorization has been started, clinical notes attached, case is pending medical review for approval   Pending case number:[id number]

## 2025-03-20 ENCOUNTER — OFFICE VISIT (OUTPATIENT)
Dept: INTERNAL MEDICINE CLINIC | Facility: CLINIC | Age: 85
End: 2025-03-20

## 2025-03-20 VITALS
OXYGEN SATURATION: 98 % | WEIGHT: 139 LBS | SYSTOLIC BLOOD PRESSURE: 118 MMHG | DIASTOLIC BLOOD PRESSURE: 70 MMHG | HEART RATE: 88 BPM | BODY MASS INDEX: 26.24 KG/M2 | HEIGHT: 61 IN

## 2025-03-20 DIAGNOSIS — E11.21 TYPE 2 DIABETES MELLITUS WITH NEPHROPATHY (HCC): ICD-10-CM

## 2025-03-20 DIAGNOSIS — I63.9 CEREBELLAR INFARCT (HCC): Primary | ICD-10-CM

## 2025-03-20 DIAGNOSIS — N18.31 STAGE 3A CHRONIC KIDNEY DISEASE (HCC): ICD-10-CM

## 2025-03-20 DIAGNOSIS — C50.911 INFILTRATING DUCTAL CARCINOMA OF RIGHT BREAST, STAGE 1 (HCC): ICD-10-CM

## 2025-03-20 DIAGNOSIS — M06.9 RHEUMATOID ARTHRITIS, INVOLVING UNSPECIFIED SITE, UNSPECIFIED WHETHER RHEUMATOID FACTOR PRESENT (HCC): ICD-10-CM

## 2025-03-20 PROBLEM — G20.A1 PARKINSON'S DISEASE (HCC): Status: ACTIVE | Noted: 2025-03-20

## 2025-03-20 PROCEDURE — 3074F SYST BP LT 130 MM HG: CPT | Performed by: INTERNAL MEDICINE

## 2025-03-20 PROCEDURE — 99214 OFFICE O/P EST MOD 30 MIN: CPT | Performed by: INTERNAL MEDICINE

## 2025-03-20 PROCEDURE — 1126F AMNT PAIN NOTED NONE PRSNT: CPT | Performed by: INTERNAL MEDICINE

## 2025-03-20 PROCEDURE — 1159F MED LIST DOCD IN RCRD: CPT | Performed by: INTERNAL MEDICINE

## 2025-03-20 PROCEDURE — 1111F DSCHRG MED/CURRENT MED MERGE: CPT | Performed by: INTERNAL MEDICINE

## 2025-03-20 PROCEDURE — 3078F DIAST BP <80 MM HG: CPT | Performed by: INTERNAL MEDICINE

## 2025-03-20 PROCEDURE — 3008F BODY MASS INDEX DOCD: CPT | Performed by: INTERNAL MEDICINE

## 2025-03-20 RX ORDER — INSULIN GLARGINE 100 [IU]/ML
14 INJECTION, SOLUTION SUBCUTANEOUS EVERY MORNING
COMMUNITY
Start: 2025-02-28

## 2025-03-20 RX ORDER — ROPINIROLE 0.25 MG/1
0.25 TABLET, FILM COATED ORAL NIGHTLY
COMMUNITY
Start: 2025-02-27

## 2025-03-20 NOTE — PROGRESS NOTES
Subjective:     Patient ID: Chester Bautista is a 85 year old female.    HPI    History/Other:   She came in today for follow-up after she was discharged from the rehab.  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  She was also found to be anemic secondary to AV malformation but she was on anticoagulation due to history of PE and aortic arch thrombus after discussion with neurology and vascular surgery the plan was to continue with aspirin 81.  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    HENT: Negative.     Eyes: Negative.    Respiratory: Negative.     Cardiovascular: Negative.    Gastrointestinal: Negative.    Genitourinary: Negative.    Musculoskeletal: Negative.    Skin: Negative.    Neurological: Negative.    Hematological: Negative.    Psychiatric/Behavioral: Negative.       Current Outpatient Medications   Medication Sig Dispense Refill    rOPINIRole 0.25 MG Oral Tab Take 1 tablet (0.25 mg total) by mouth at bedtime.      LANTUS SOLOSTAR 100 UNIT/ML Subcutaneous Solution Pen-injector Inject 14 Units into the skin every morning.      Ferrous Sulfate 325 (65 Fe) MG Oral Tab Take 1 tablet (325 mg total) by mouth daily with breakfast. 90 tablet 1    fluticasone-salmeterol 250-50 MCG/ACT Inhalation Aerosol Powder, Breath Activated Inhale 1 puff into the lungs every 12 (twelve) hours. 60 each 3    ipratropium 0.02 % Inhalation Solution Take 2.5 mL (500 mcg total) by nebulization 2 (two) times daily. 187.5  mL 2    pantoprazole 40 MG Oral Tab EC Take 1 tablet (40 mg total) by mouth 2 (two) times daily before meals.      meclizine 12.5 MG Oral Tab Take 1 tablet (12.5 mg total) by mouth 3 (three) times daily as needed for Dizziness.      lidocaine-menthol 4-1 % External Patch Place 1 patch onto the skin daily.      aspirin 81 MG Oral Tab EC Take 1 tablet (81 mg total) by mouth daily.      acetaminophen 325 MG Oral Tab Take 2 tablets (650 mg total) by mouth every 6 (six) hours as needed.      Cholecalciferol (VITAMIN D3) 1.25 MG (60622 UT) Oral Cap TAKE 1 CAPSULE BY MOUTH EVERY 2 WEEKS 6 capsule 1    rosuvastatin 20 MG Oral Tab Take 1 tablet (20 mg total) by mouth nightly. 90 tablet 3    magnesium oxide 400 MG Oral Tab Take 1 tablet (400 mg total) by mouth in the morning, at noon, and at bedtime. Per Dr. JENSEN Mata      loratadine 10 MG Oral Tab TAKE 1 TABLET BY MOUTH EVERY DAY 30 tablet 1    hydroxychloroquine 200 MG Oral Tab Take 1.5 tablets (300 mg total) by mouth daily. 135 tablet 1    predniSONE 5 MG Oral Tab Take 1 tablet (5 mg total) by mouth daily. 90 tablet 1    albuterol (2.5 MG/3ML) 0.083% Inhalation Nebu Soln Take 3 mL (2.5 mg total) by nebulization every 6 (six) hours as needed for Wheezing. 360 mL 3    glipiZIDE 5 MG Oral Tab Take 1 tablet (5 mg total) by mouth every morning before breakfast. 90 tablet 0    albuterol 108 (90 Base) MCG/ACT Inhalation Aero Soln TAKE 2 PUFFS BY MOUTH EVERY 4 TO 6 HOURS AS NEEDED 6.7 each 5    montelukast 10 MG Oral Tab Take 1 tablet (10 mg total) by mouth nightly. 90 tablet 3    carvedilol 12.5 MG Oral Tab Take 1 tablet (12.5 mg total) by mouth 2 (two) times daily with meals. 180 tablet 3    Blood Glucose Monitoring Suppl w/Device Does not apply Kit Dx. E11.9. Checks qam. 1 kit 0    gabapentin 100 MG Oral Cap Take 1 capsule (100 mg total) by mouth nightly. 90 capsule 3    Accu-Chek FastClix Lancets Does not apply Misc Check sugar once daily as directed (E11.42) 100 each 2     LuckyCalTouch Delica Lancets 33G Does not apply Misc 4 x daily 100 each 1    acetaminophen 500 MG Oral Tab Take 1 tablet (500 mg total) by mouth daily.      sennosides 8.6 MG Oral Tab Take 1 tablet (8.6 mg total) by mouth 2 (two) times daily. (Patient not taking: Reported on 3/20/2025)      BD PEN NEEDLE KADEN 2ND GEN 32G X 4 MM Does not apply Misc 1 EACH DAILY. USE DAILY WITH INSULIN 100 each 1    Blood Glucose Monitoring Suppl (BLOOD GLUCOSE SYSTEM GILLES) Does not apply Kit Dx. E11.9. Checks qam. 1 kit 0    Blood Gluc Meter Disp-Strips Does not apply Device Dx. E11.9. Checks qam. 100 each 0    Blood Glucose Monitoring Suppl (ONETOUCH VERIO) w/Device Does not apply Kit 1 each daily. Test 1x daily 1 kit 0     Allergies:Allergies[1]    Past Medical History:    Anxiety state    Cancer (HCC)    breast cancer 2018    Cerebellar stroke (HCC)    Chronic obstructive pulmonary disease, unspecified (HCC)    Chronic obstructive pulmonary disease, unspecified (HCC)    COPD (chronic obstructive pulmonary disease) (HCC)    Cystic thyroid nodule    Diabetes (HCC)    Diabetes mellitus (HCC)    Essential hypertension    Exposure to medical diagnostic radiation    High blood pressure    High cholesterol    History of blood transfusion    low hemoglobin    Osteoarthritis    PONV (postoperative nausea and vomiting)    Pulmonary embolism (HCC)    Pulmonary emphysema (HCC)    Rheumatoid arthritis (HCC)    Stroke (HCC)      Past Surgical History:   Procedure Laterality Date    Cataract extraction w/  intraocular lens implant B[facility]     Colonoscopy      Colonoscopy N/A 07/21/2022    Procedure: COLONOSCOPY;  Surgeon: Mikey Garcia MD;  Location: [facility] ENDOSCOPY    Colonoscopy N/A 06/15/2023    Procedure: COLONOSCOPY;  Surgeon: Mikey Garcia MD;  Location: [facility] ENDOSCOPY    Correct bunion,simple      right foot  1993    Egd  12/21/2023    Dr. Garcia; Duodenal AVM's x4 cauterized    Hysterectomy      1972, no abnormal  Paps, due to heavy bleeding with fibroids.  Not sure if it had bilateral salpingo-oophorectomy.    Ir aneurysm repairm  2010    Computer assisted volumetric craniofomy with clipping of anterior cerebral artery.    Lumpectomy right      Radiation right      Rotator cuff repair      1993    Total knee replacement Right 2010    Total knee replacement Left 2015    Transoral incisionless fundoplication - internal  2012 Fredy fundoplication at [facility] Dr. Fournier.    Fredy fundoplication at [facility] Dr. Fournier.    Trigger finger release      left hand      Yag capsulotomy - ou - both eyes Bilateral 2021    done in [place]      Family History   Problem Relation Age of Onset    Heart Surgery Mother         Leaky valve at 41 y/o.     Prostate Cancer Brother     Cancer Brother     Diabetes Maternal Grandmother     Diabetes Paternal Aunt     Breast Cancer Self 78    Breast Cancer Niece         before 50    Macular degeneration Neg     Glaucoma Neg       Social History:   Social History     Socioeconomic History    Marital status:    Tobacco Use    Smoking status: Former     Current packs/day: 0.00     Types: Cigarettes     Quit date:      Years since quittin.2     Passive exposure: Past    Smokeless tobacco: Never    Tobacco comments:     [id number]   Vaping Use    Vaping status: Never Used   Substance and Sexual Activity    Alcohol use: Never    Drug use: Never   Social History Narrative    Just moved in from [place].   passed away and that is why moved from [place] to be with daughter who lives in [place].  Currently lives with daughter.     Social Drivers of Health     Food Insecurity: Low Risk  (2025)    Received from [facility]    Food Insecurity     Have there been times that your food ran out, and you didn't have money to get more?: No     Are there times that you worry that this might happen?: No    Transportation Needs: Low Risk  (2/18/2025)    Received from [facility]    Transportation Needs     Do you have trouble getting transportation to medical appointments?: No   Housing Stability: Low Risk  (2/18/2025)    Received from [facility]    Housing Stability     Are you worried that your electric, gas, oil, or water might be shut off?: No     Are you concerned about having a safe and reliable place to live?: No        Objective:   Physical Exam  Vitals and nursing note reviewed.   Constitutional:       Appearance: Normal appearance.   HENT:      Head: Normocephalic and atraumatic.   Cardiovascular:      Rate and Rhythm: Normal rate and regular rhythm.      Pulses: Normal pulses.      Heart sounds: Normal heart sounds.   Pulmonary:      Effort: Pulmonary effort is normal.      Breath sounds: Normal breath sounds.   Abdominal:      Palpations: Abdomen is soft.   Musculoskeletal:         General: Normal range of motion.      Cervical back: Normal range of motion and neck supple.   Skin:     General: Skin is warm and dry.   Neurological:      General: No focal deficit present.      Mental Status: She is alert. Mental status is at baseline.   Psychiatric:         Mood and Affect: Mood normal.         Assessment & Plan:   Left cerebellar infarct MRI reviewed, continue with medication follow-up with neurology    Anemia will continue to monitor status post 1 unit PRBC she is following with hematology    He is status post IVC filter 2/1 2025-not on full anticoagulation due to recurrent bleeding secondary to AVM  No orders of the defined types were placed in this encounter.      Meds This Visit:  Requested Prescriptions      No prescriptions requested or ordered in this encounter       Imaging & Referrals:  None            [1]   Allergies  Allergen Reactions    Celebrex [Celecoxib] PALPITATIONS    Penicillins ITCHING and SWELLING    Amlodipine OTHER (SEE COMMENTS)     Calves  Swelling     Metformin And Related UNKNOWN    Olmesartan UNKNOWN

## 2025-03-27 ENCOUNTER — NURSE ONLY (OUTPATIENT)
Age: 85
End: 2025-03-27
Attending: INTERNAL MEDICINE
Payer: MEDICARE

## 2025-03-27 DIAGNOSIS — D50.0 IRON DEFICIENCY ANEMIA DUE TO CHRONIC BLOOD LOSS: Primary | ICD-10-CM

## 2025-03-27 DIAGNOSIS — D64.9 ANEMIA, UNSPECIFIED TYPE: ICD-10-CM

## 2025-03-27 DIAGNOSIS — E78.5 DYSLIPIDEMIA: ICD-10-CM

## 2025-03-27 DIAGNOSIS — D50.9 IRON DEFICIENCY ANEMIA, UNSPECIFIED: ICD-10-CM

## 2025-03-27 LAB
ALBUMIN SERPL-MCNC: 3.4 G/DL (ref 3.2–4.8)
ALBUMIN/GLOB SERPL: 1.1 {RATIO} (ref 1–2)
ALP LIVER SERPL-CCNC: 65 U/L
ALT SERPL-CCNC: 10 U/L
ANION GAP SERPL CALC-SCNC: 8 MMOL/L (ref 0–18)
ANTIBODY SCREEN: NEGATIVE
AST SERPL-CCNC: 22 U/L (ref ?–34)
BASOPHILS # BLD AUTO: 0.02 X10(3) UL (ref 0–0.2)
BASOPHILS NFR BLD AUTO: 0.2 %
BILIRUB SERPL-MCNC: 0.3 MG/DL (ref 0.2–1.1)
BUN BLD-MCNC: 13 MG/DL (ref 9–23)
BUN/CREAT SERPL: 13.5 (ref 10–20)
CALCIUM BLD-MCNC: 8.7 MG/DL (ref 8.7–10.4)
CHLORIDE SERPL-SCNC: 107 MMOL/L (ref 98–112)
CHOLEST SERPL-MCNC: 100 MG/DL (ref ?–200)
CO2 SERPL-SCNC: 26 MMOL/L (ref 21–32)
CREAT BLD-MCNC: 0.96 MG/DL
DEPRECATED HBV CORE AB SER IA-ACNC: 117 NG/ML
DEPRECATED RDW RBC AUTO: 67.8 FL (ref 35.1–46.3)
EGFRCR SERPLBLD CKD-EPI 2021: 58 ML/MIN/1.73M2 (ref 60–?)
EOSINOPHIL # BLD AUTO: 0.06 X10(3) UL (ref 0–0.7)
EOSINOPHIL NFR BLD AUTO: 0.6 %
ERYTHROCYTE [DISTWIDTH] IN BLOOD BY AUTOMATED COUNT: 21.2 % (ref 11–15)
GLOBULIN PLAS-MCNC: 3.1 G/DL (ref 2–3.5)
GLUCOSE BLD-MCNC: 252 MG/DL (ref 70–99)
HCT VFR BLD AUTO: 26.1 %
HDLC SERPL-MCNC: 49 MG/DL (ref 40–59)
HGB BLD-MCNC: 7.8 G/DL
IMM GRANULOCYTES # BLD AUTO: 0.04 X10(3) UL (ref 0–1)
IMM GRANULOCYTES NFR BLD: 0.4 %
IRON SATN MFR SERPL: 52 %
IRON SERPL-MCNC: 145 UG/DL
LDLC SERPL CALC-MCNC: 32 MG/DL (ref ?–100)
LYMPHOCYTES # BLD AUTO: 0.55 X10(3) UL (ref 1–4)
LYMPHOCYTES NFR BLD AUTO: 5.8 %
MCH RBC QN AUTO: 26.6 PG (ref 26–34)
MCHC RBC AUTO-ENTMCNC: 29.9 G/DL (ref 31–37)
MCV RBC AUTO: 89.1 FL
MONOCYTES # BLD AUTO: 0.28 X10(3) UL (ref 0.1–1)
MONOCYTES NFR BLD AUTO: 3 %
NEUTROPHILS # BLD AUTO: 8.53 X10 (3) UL (ref 1.5–7.7)
NEUTROPHILS # BLD AUTO: 8.53 X10(3) UL (ref 1.5–7.7)
NEUTROPHILS NFR BLD AUTO: 90 %
NONHDLC SERPL-MCNC: 51 MG/DL (ref ?–130)
OSMOLALITY SERPL CALC.SUM OF ELEC: 301 MOSM/KG (ref 275–295)
PLATELET # BLD AUTO: 230 10(3)UL (ref 150–450)
PLATELET MORPHOLOGY: NORMAL
POTASSIUM SERPL-SCNC: 3.6 MMOL/L (ref 3.5–5.1)
PROT SERPL-MCNC: 6.5 G/DL (ref 5.7–8.2)
RBC # BLD AUTO: 2.93 X10(6)UL
RH BLOOD TYPE: POSITIVE
SODIUM SERPL-SCNC: 141 MMOL/L (ref 136–145)
TOTAL IRON BINDING CAPACITY: 277 UG/DL (ref 250–425)
TRANSFERRIN SERPL-MCNC: 205 MG/DL (ref 250–380)
TRIGL SERPL-MCNC: 101 MG/DL (ref 30–149)
VLDLC SERPL CALC-MCNC: 14 MG/DL (ref 0–30)
WBC # BLD AUTO: 9.5 X10(3) UL (ref 4–11)

## 2025-03-27 RX ORDER — CARVEDILOL 12.5 MG/1
12.5 TABLET ORAL 2 TIMES DAILY WITH MEALS
Qty: 180 TABLET | Refills: 3 | Status: SHIPPED | OUTPATIENT
Start: 2025-03-27 | End: 2025-03-31

## 2025-03-27 NOTE — TELEPHONE ENCOUNTER
Please review. Protocol Failed; No Protocol    Future Appointments   Date Time Provider Department Center   3/27/2025  2:45 PM [facility] CC LAB1 [facility] [place]   3/31/2025  1:40 PM Joe Kang DO ENIOAKPARK2 [facility] [address]   4/3/2025  1:20 PM Eric Mohamud MD ELMSW [facility] [place]   4/4/2025  1:00 PM Ashia Goodrich MD [id number]  [facility]   4/10/2025  1:45 PM Vic Thakkar APRN ECWMOENDO [facility]   4/10/2025  2:30 PM [facility] CC LAB1 [facility] [place] [facility]   4/24/2025  2:30 PM [facility]1 [facility] [place] [facility]   4/30/2025  1:40 PM Hannah Encinas MD [id number] [facility]   5/8/2025  2:30 PM [facility] CC LAB1 [facility] [place]   6/20/2025  1:00 PM Heike Sorto MD ECHNDIM [facility] [place]   9/18/2025 12:15 PM Chapin Parker MD [id number]  [facility]

## 2025-03-31 ENCOUNTER — OFFICE VISIT (OUTPATIENT)
Dept: NEUROLOGY | Facility: CLINIC | Age: 85
End: 2025-03-31
Payer: MEDICARE

## 2025-03-31 VITALS — WEIGHT: 139 LBS | BODY MASS INDEX: 26.24 KG/M2 | HEIGHT: 61 IN

## 2025-03-31 DIAGNOSIS — I69.30 SEQUELA, POST-STROKE: Primary | ICD-10-CM

## 2025-03-31 DIAGNOSIS — G43.E09 CHRONIC MIGRAINE WITH AURA WITHOUT STATUS MIGRAINOSUS, NOT INTRACTABLE: ICD-10-CM

## 2025-03-31 DIAGNOSIS — G25.81 RESTLESS LEG: ICD-10-CM

## 2025-03-31 PROBLEM — R51.9 DAILY HEADACHE: Status: ACTIVE | Noted: 2025-03-31

## 2025-03-31 PROCEDURE — 99214 OFFICE O/P EST MOD 30 MIN: CPT | Performed by: OTHER

## 2025-03-31 PROCEDURE — 1159F MED LIST DOCD IN RCRD: CPT | Performed by: OTHER

## 2025-03-31 PROCEDURE — 3008F BODY MASS INDEX DOCD: CPT | Performed by: OTHER

## 2025-03-31 RX ORDER — GLIPIZIDE 10 MG/1
TABLET ORAL
COMMUNITY
Start: 2025-03-28

## 2025-03-31 RX ORDER — CARVEDILOL 25 MG/1
TABLET ORAL
COMMUNITY
Start: 2025-03-28

## 2025-03-31 RX ORDER — LOSARTAN POTASSIUM 25 MG/1
25 TABLET ORAL DAILY
COMMUNITY

## 2025-03-31 RX ORDER — INSULIN LISPRO 100 [IU]/ML
3 INJECTION, SOLUTION INTRAVENOUS; SUBCUTANEOUS
COMMUNITY

## 2025-03-31 NOTE — PROGRESS NOTES
[facility] [place]  [address], [address]  [facility] [address]  [phone]        Neurology Clinic Follow Up Note    Chief Complaint:  Neurologic Problem (Patient presents here today to establish care, patient was referred by ED 1/17/24 to evaluate and treat Cerebellar stroke. Patient c/o left fingertip tingling. Patient c/o dizziness on some days, patient is going to PT, OT, ST. 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  Patient also found to have new subsegmental PEs on admission.    She was admitted for 18 days in feb 2025 for recurrent strokes.     She has had increasing burden of stroke throughout her hospital stay.  Initially unable to anticoagulate because of her longstanding history of GI bleeding due to numerous AVMs.  Ultimately decided to start heparin drip on 1/28/2025 with PTT goal of 60-90 no boluses.  Also on antiplatelet therapy for left vertebral artery stent.    Initially she had left-sided posterior circulation strokes in her cerebellum on admission and a new left thalamic stroke on 1/19 now status post vertebral artery stenting on 1/24 C/B acute blood loss anemia requiring 1 unit of PRBCs.  Then had bihemispheric embolic strokes on 1/25/2025.    Per prior discussions with GI service patient is at high risk of recurrent bleeding due to her AVMs and the potential harms of anticoagulation would outweigh the potential benefits.  Therefore she  was started on Plavix and then switched to Brilinta.    After her vertebral artery stenting the she complained of a headache with migrainous features.  She had a stat head CT which demonstrated contrast exacerbation versus new subarachnoid hemorrhage.  She had a repeat head CT 6 hours later which demonstrated continued improvement of her subarachnoid hemorrhage, and her repeat head CT at 6 AM on 1/25/2025 demonstrate near resolution of the contrast/subarachnoid blood.  Patient was maintained on her antiplatelets.  Repeat exam on the morning of 1/25/2025 demonstrated new global aphasia, and a questionable right visual field deficit.  She had a repeat stat head CT and CTA head and neck that did not demonstrate any interval any interval large vessel occlusion.  However, her repeat MRI of the brain demonstrates interval infarcts in her left parietal/occipital region, left posterior temporal lobe, small medial occipital lobe infarct,   L>R frontal lobe/motor cortex, and left frontal lobe. She has infarct in her anterior and posterior circulation.    Her exam   improved on 1/26/2025 however she still has persistent right hemianopsia and R LE > U UE weakness.    Restarted her Brilinta 90 mg twice daily on 1/26/2025.  Reassessment 1/27/2025; patient had a new oxygen requirement.  She was now on nasal cannula.  Discussed with critical care service who recommended stat lower extremity Dopplers and concurred that she would benefit from low intensity anticoagulation.    1/28/2025 her heparin drip with PTT goal of 60-90 was started.  There was no bolus.  Antiplatelets changed from Brilinta 90 mg twice daily to aspirin 81 mg daily.  Anticoagulation will tentatively be held on 1/29/2025 for GI procedure.  On the afternoon of the 28th patient complained of 5 out of 10 headache with dizziness and nausea.  Reported the dizziness was new.  Heparin drip was held.  She had a stat head CT that was negative for any acute hemorrhage.  Her  dizziness is improved and headache resolved spontaneously.    She was not a candidate for tPA because she is out of the time window and because of her history of recurrent GI bleeds and AVMs.  Not can for thrombectomy because no LVO.    ON  01/29/25 she was stable; endorses well formed visual hallucinations.  She  has insight into  her sx.    However, on 1/30/2025 she continues to have occult bleeding.  Her hemoglobin  dropped a unit .  Her heparin drip has been discontinued.  This does increase the risk of recurrent embolic event but she is still on antiplatelets. She had an ivc filter placed on 1/31/25.    She was discharged to acute rehab.    03/31/25 Interval History/Subjective :   Here in follow up for recurrent strokes.  Here w/ her daughter.   She is on aspirin 81mg only.    Reports her vision is \"lousy.\"  She  report it is cloudy.  She does not have an ophthalmology appointment anymore.    Well formed visual hallucinations have resolved.    Her restless leg is still bothersome.    It is not happening earlier in the day.  Does not affect her arms.  Explained we would have to stop  the ropinirole and increase gabapentin given the risk of augmentation.  Reports it take longer for the ropinirole to kick in.    She goes to bed at 9 or 10.    RLS starts at 7 or 8 PM.    She has headaches associated w/ dizzy spells. Sometime she has them on a daily basis. Takes tylenol once  a week. Pain is a 8/10. She has  photophobia with her headaches. She has had severe headaches in the hospital.     Her short term memory is impaired. She has ?apraxia; she did not know how to unlock the car doors.     Her octreotide infusion is now 1x a month.  She is going to follow up with  Dr. Garcia.    ROS: Pertinent positive and negatives per HPI.  All others were reviewed and negative.     Medications:  Current Outpatient Medications   Medication Instructions    Accu-Chek FastClix Lancets Does not apply Misc Check sugar once daily as  directed (E11.42)    acetaminophen (TYLENOL EXTRA STRENGTH) 500 mg, Daily    acetaminophen (TYLENOL) 650 mg, Every 6 hours PRN    albuterol (VENTOLIN) 2.5 mg, Nebulization, Every 6 hours PRN    albuterol 108 (90 Base) MCG/ACT Inhalation Aero Soln TAKE 2 PUFFS BY MOUTH EVERY 4 TO 6 HOURS AS NEEDED    aspirin 81 mg, Daily    BD PEN NEEDLE KADEN 2ND GEN 32G X 4 MM Does not apply Misc 1 EACH DAILY. USE DAILY WITH INSULIN    Blood Gluc Meter Disp-Strips Does not apply Device Dx. E11.9. Checks qam.    Blood Glucose Monitoring Suppl (BLOOD GLUCOSE SYSTEM GILLES) Does not apply Kit Dx. E11.9. Checks qam.    Blood Glucose Monitoring Suppl (ONETOUCH VERIO) w/Device Does not apply Kit 1 each, Does not apply, Daily, Test 1x daily    Blood Glucose Monitoring Suppl w/Device Does not apply Kit Dx. E11.9. Checks qam.    carvedilol (COREG) 12.5 mg, Oral, 2 times daily with meals    Cholecalciferol (VITAMIN D3) 1.25 MG (87940 UT) Oral Cap TAKE 1 CAPSULE BY MOUTH EVERY 2 WEEKS    Ferrous Sulfate 325 mg, Oral, Daily with breakfast    fluticasone-salmeterol 250-50 MCG/ACT Inhalation Aerosol Powder, Breath Activated 1 puff, Inhalation, Every 12 hours scheduled    gabapentin (NEURONTIN) 100 mg, Oral, Nightly    glipiZIDE (GLUCOTROL) 5 mg, Oral, Every morning before breakfast    hydroxychloroquine (PLAQUENIL) 300 mg, Oral, Daily    ipratropium (ATROVENT) 500 mcg, Nebulization, 2 times daily    Lantus SoloStar 14 Units, Every morning    lidocaine-menthol 4-1 % External Patch 1 patch, Daily    loratadine 10 MG Oral Tab TAKE 1 TABLET BY MOUTH EVERY DAY    magnesium oxide (MAG-OX) 400 mg, 3 times daily    meclizine (ANTIVERT) 12.5 mg, 3 times daily PRN    montelukast (SINGULAIR) 10 mg, Oral, Nightly    OneTouch Delica Lancets 33G Does not apply Misc 4 x daily    pantoprazole (PROTONIX) 40 mg, 2 times daily before meals    predniSONE (DELTASONE) 5 mg, Oral, Daily    rOPINIRole (REQUIP) 0.25 mg, Nightly    rosuvastatin (CRESTOR) 20 mg, Oral, Nightly     sennosides (SENOKOT) 8.6 mg, 2 times daily        Reviewed and assessed      Objective:  Last vitals and weight :  Body mass index is 26.26 kg/m².   There were no vitals filed for this visit.   Height 61\", weight 139 lb (63 kg).  Ht 61\"   Wt 139 lb (63 kg)   BMI 26.26 kg/m²   Exam:  - General: appears stated age and no distress        - Pulmonary: Normal excursion of the chest.  No signs of respiratory distress.  Neurologic Exam  - Mental Status: Alert and attentive.  She knew her age and the month.  Speech is spontaneous, fluent, and prosodic. Comprehension and repetition intact. Phrase length and rate are normal. No paraphasic errors, neologisms, anomia, acalculia, apraxia, anosognosia, or R/L confusion.   - Cranial Nerves: No gaze preference. Visual fields:normal  Pupils are 4mm briskly constricting to 3mm and equally round and reactive to light  in a well lit room. No rAPD. EOMI. No nystagmus. No ptosis. V1-V3 intact B/L to light touch.No pathological facial asymmetry. No flattening of the nasolabial fold. .  Hearing grossly intact.  Tongue midline. No atrophy or fasiculations of the tongue noted. Palate and uvula elevate symmetrically.  Shoulder shrug symmetric.     - Motor:  normal tone/bulk. No interosseous wasting. No flattening of hypothenar eminences.   Motor Strength    Pronator drift: No pronator drift   Arm Rolling: No orbiting.   Finger Taps: Finger taps are symmetric in rate and amplitude.    Rapid movements: symmetric. No fatiguing.   Right Left     Motor Strength       Hip Flexors 5 5          Foot Taps:    Asterixis: No asterixis noted.   Tremor:          - Sensory:   Light touch: normal     - Cerebellum: No truncal ataxia. No titubations. No dysmetria, no dysdiadochokinesis. No rebound.      Modified deangelo scale score: 3    NIH Stroke Scale  Person Administering Scale: Joe Kang DO  1a  Level of consciousness: 0 = Alert keenly responsive    1b. LOC questions:  0 = Answers both questions  correctly     1c. LOC commands: 0 = Performs both tasks correctly    2.  Best Gaze: 0 = Normal    3.  Visual: 0 = No visual loss     4. Facial Palsy: 0 = Normal symmetrical movement     5a.  Motor left arm: 0 = No drift; limb holds 90º(or 45º) for full 10 seconds   5b.  Motor right arm: 0 = No drift; limb holds 90º(or 45º) for full 10 seconds   6a. motor left le = No drift; leg holds 30º for full 5 seconds     6b  Motor right le = No drift; leg holds 30º for full 5 seconds     7. Limb Ataxia: 0 = Absent     8.  Sensory: 0 = Normal, no sensory loss     9. Best Language:  0 = No aphasia, normal      10. Dysarthria: 0 = Normal articulation   11. Extinction and Inattention: 0 = No abnormality     Total:   0            Most recent lab results:     No results found for this or any previous visit (from the past 36 hours).  Reviewed and assessed    Diagnostic studies:  Performed an independent visualization of  multiple ctas of the head and and neck and cta of the aorta from her  admission  Imaging revealed:   agree w/ read    Assessment     Her  chronic mural thrombus was most likely due to thrombus formation related to her atherosclerotic disease. Will  confirm with Dr. Mohamud.     In the future will have to go off the Ropinerole for RLS.   Will add atogepant for her chronic migraine.  Referred to neuro-ophthalmology for her vision for visual fields and perimetry.          Plan   1. Sequela, post-stroke  - Specialty Other Referral - External    2. Restless leg  - Gabapentin Enacarbil  MG Oral Tab CR; Take 300 mg by mouth every evening. Take every evening at 6 pm  Dispense: 90 tablet; Refill: 1    3. Chronic migraine with aura without status migrainosus, not intractable  - Atogepant 30 MG Oral Tab; Take 30 mg by mouth daily.  Dispense: 30 tablet; Refill: 11                This document is not intended to support charting by exception.  Sections left blank in a completed note should be presumed not to  have been done.      Disclaimer:   This record was dictated using  Dragon software. There may be errors due to voice recognition problems that were not realized and corrected during the completion of the note.         Thank you for allowing me to participate in the care of your patient.    Joe Kang DO  3/31/2025

## 2025-04-01 ENCOUNTER — MED REC SCAN ONLY (OUTPATIENT)
Dept: NEUROLOGY | Facility: CLINIC | Age: 85
End: 2025-04-01

## 2025-04-03 ENCOUNTER — OFFICE VISIT (OUTPATIENT)
Age: 85
End: 2025-04-03
Attending: INTERNAL MEDICINE
Payer: MEDICARE

## 2025-04-03 ENCOUNTER — NURSE ONLY (OUTPATIENT)
Age: 85
End: 2025-04-03
Attending: INTERNAL MEDICINE
Payer: MEDICARE

## 2025-04-03 VITALS
WEIGHT: 141 LBS | DIASTOLIC BLOOD PRESSURE: 81 MMHG | SYSTOLIC BLOOD PRESSURE: 151 MMHG | OXYGEN SATURATION: 96 % | TEMPERATURE: 98 F | RESPIRATION RATE: 18 BRPM | HEIGHT: 61 IN | HEART RATE: 74 BPM | BODY MASS INDEX: 26.62 KG/M2

## 2025-04-03 DIAGNOSIS — K55.21 AVM (ARTERIOVENOUS MALFORMATION) OF SMALL BOWEL, ACQUIRED WITH HEMORRHAGE: ICD-10-CM

## 2025-04-03 DIAGNOSIS — D50.0 IRON DEFICIENCY ANEMIA DUE TO CHRONIC BLOOD LOSS: Primary | ICD-10-CM

## 2025-04-03 DIAGNOSIS — D50.0 IRON DEFICIENCY ANEMIA DUE TO CHRONIC BLOOD LOSS: ICD-10-CM

## 2025-04-03 LAB
ANTIBODY SCREEN: NEGATIVE
BASOPHILS # BLD AUTO: 0.03 X10(3) UL (ref 0–0.2)
BASOPHILS NFR BLD AUTO: 0.3 %
DEPRECATED HBV CORE AB SER IA-ACNC: 71 NG/ML
DEPRECATED RDW RBC AUTO: 70.9 FL (ref 35.1–46.3)
EOSINOPHIL # BLD AUTO: 0.11 X10(3) UL (ref 0–0.7)
EOSINOPHIL NFR BLD AUTO: 1.3 %
ERYTHROCYTE [DISTWIDTH] IN BLOOD BY AUTOMATED COUNT: 21.2 % (ref 11–15)
HCT VFR BLD AUTO: 26.3 %
HGB BLD-MCNC: 7.9 G/DL
IMM GRANULOCYTES # BLD AUTO: 0.03 X10(3) UL (ref 0–1)
IMM GRANULOCYTES NFR BLD: 0.3 %
IRON SATN MFR SERPL: 63 %
IRON SERPL-MCNC: 176 UG/DL
LYMPHOCYTES # BLD AUTO: 0.56 X10(3) UL (ref 1–4)
LYMPHOCYTES NFR BLD AUTO: 6.4 %
MCH RBC QN AUTO: 27.6 PG (ref 26–34)
MCHC RBC AUTO-ENTMCNC: 30 G/DL (ref 31–37)
MCV RBC AUTO: 92 FL
MONOCYTES # BLD AUTO: 0.31 X10(3) UL (ref 0.1–1)
MONOCYTES NFR BLD AUTO: 3.5 %
NEUTROPHILS # BLD AUTO: 7.74 X10 (3) UL (ref 1.5–7.7)
NEUTROPHILS # BLD AUTO: 7.74 X10(3) UL (ref 1.5–7.7)
NEUTROPHILS NFR BLD AUTO: 88.2 %
PLATELET # BLD AUTO: 266 10(3)UL (ref 150–450)
PLATELET MORPHOLOGY: NORMAL
RBC # BLD AUTO: 2.86 X10(6)UL
RH BLOOD TYPE: POSITIVE
TOTAL IRON BINDING CAPACITY: 280 UG/DL (ref 250–425)
TRANSFERRIN SERPL-MCNC: 206 MG/DL (ref 250–380)
WBC # BLD AUTO: 8.8 X10(3) UL (ref 4–11)

## 2025-04-03 NOTE — PROGRESS NOTES
Cancer Center Progress Note    Patient Name: Chester Bautista   YOB: 1940   Medical Record Number: [id number]   [facility] [id number]  Date: 4/3/25    Chief Complaint:  BUZZ, hx of PE and AVM      Oncology History:  85 year old with chronic iron deficiency anemia in the setting of bleeding AVMs s/p repeat endoscopic treatment, Iv iron treatment and recent RBC transfusion for hgb of 6 on 5/2022. EGD/CLN Dr Garcia 7/2022 noted for 2 AVM in duodenum and 1 AVM in the colon s/p treatment.  There is ongoing concern for more AVMs as well need for chronic monitoring, infusions etc.      History is noted for RA managed by Dr Goodrich, hepatitis B core antibody positivity, and remote history of breast cancer (records not available).     Pt had an infusion 2/2023 injectafer.   5/19/23: reported to ED due to rectal bleeding and abd pain, has CLN planned.    In early 2/24 rec for ER eval in light of the critically HTN. She was treated for pneumonia. Prudencio was then found to have PE and GI bleeding. She was treated with IV heparin and required cauterization twice for GI bleeding, so never started on Apixaban and held from anticoagulation.    Received IV iron in 4/24  Prudencio underwent bidirectional scoping with Dr. Aundrea Garcia in GI in June 2023 were several areas of AVM were cauterized.     Getting monthly octreotide with Dr. Garcia    She last completed IV iron 3 months ago 10/9-10/31/24 (Venofer)    4/3/25  Interim  Patient here with daughter Brea, she presents for f/u of BUZZ, reporting increased fatigue since last visit. She denies bruising, denies blood loss from any orifice that she can see. Continues on octreotide.       Past Medical History: history of breast cancer, BUZZ, bleeding AVM, diabetes, HTN, HLD, RA  Past Surgical History: Hysterectomy 1972, IR aneurysm repair, right lumpectomy, knee replacements  Family History: neg for malignancy  Social History: lives with [facility], able to do all ADLs, no smoking  or ETOH    Current Outpatient Medications:     losartan 25 MG Oral Tab, Take 1 tablet (25 mg total) by mouth daily., Disp: , Rfl:     insulin lispro 100 UNIT/ML Injection Solution, Inject 3 Units into the skin., Disp: , Rfl:     glipiZIDE 10 MG Oral Tab, , Disp: , Rfl:     carvedilol 25 MG Oral Tab, , Disp: , Rfl:     Gabapentin Enacarbil  MG Oral Tab CR, Take 300 mg by mouth every evening. Take every evening at 6 pm, Disp: 90 tablet, Rfl: 1    Atogepant 30 MG Oral Tab, Take 30 mg by mouth daily., Disp: 30 tablet, Rfl: 11    rOPINIRole 0.25 MG Oral Tab, Take 1 tablet (0.25 mg total) by mouth at bedtime., Disp: , Rfl:     LANTUS SOLOSTAR 100 UNIT/ML Subcutaneous Solution Pen-injector, Inject 14 Units into the skin every morning., Disp: , Rfl:     Ferrous Sulfate 325 (65 Fe) MG Oral Tab, Take 1 tablet (325 mg total) by mouth daily with breakfast., Disp: 90 tablet, Rfl: 1    fluticasone-salmeterol 250-50 MCG/ACT Inhalation Aerosol Powder, Breath Activated, Inhale 1 puff into the lungs every 12 (twelve) hours., Disp: 60 each, Rfl: 3    ipratropium 0.02 % Inhalation Solution, Take 2.5 mL (500 mcg total) by nebulization 2 (two) times daily., Disp: 187.5 mL, Rfl: 2    sennosides 8.6 MG Oral Tab, Take 1 tablet (8.6 mg total) by mouth 2 (two) times daily. (Patient not taking: Reported on 3/31/2025), Disp: , Rfl:     pantoprazole 40 MG Oral Tab EC, Take 1 tablet (40 mg total) by mouth 2 (two) times daily before meals., Disp: , Rfl:     meclizine 12.5 MG Oral Tab, Take 1 tablet (12.5 mg total) by mouth 3 (three) times daily as needed for Dizziness., Disp: , Rfl:     lidocaine-menthol 4-1 % External Patch, Place 1 patch onto the skin daily., Disp: , Rfl:     aspirin 81 MG Oral Tab EC, Take 1 tablet (81 mg total) by mouth daily., Disp: , Rfl:     acetaminophen 325 MG Oral Tab, Take 2 tablets (650 mg total) by mouth every 6 (six) hours as needed. (Patient not taking: Reported on 3/31/2025), Disp: , Rfl:     Cholecalciferol  (VITAMIN D3) 1.25 MG (68610 UT) Oral Cap, TAKE 1 CAPSULE BY MOUTH EVERY 2 WEEKS, Disp: 6 capsule, Rfl: 1    rosuvastatin 20 MG Oral Tab, Take 1 tablet (20 mg total) by mouth nightly., Disp: 90 tablet, Rfl: 3    magnesium oxide 400 MG Oral Tab, Take 1 tablet (400 mg total) by mouth in the morning, at noon, and at bedtime. Per Dr. JENSEN Mata, Disp: , Rfl:     loratadine 10 MG Oral Tab, TAKE 1 TABLET BY MOUTH EVERY DAY, Disp: 30 tablet, Rfl: 1    hydroxychloroquine 200 MG Oral Tab, Take 1.5 tablets (300 mg total) by mouth daily., Disp: 135 tablet, Rfl: 1    predniSONE 5 MG Oral Tab, Take 1 tablet (5 mg total) by mouth daily., Disp: 90 tablet, Rfl: 1    albuterol (2.5 MG/3ML) 0.083% Inhalation Nebu Soln, Take 3 mL (2.5 mg total) by nebulization every 6 (six) hours as needed for Wheezing., Disp: 360 mL, Rfl: 3    glipiZIDE 5 MG Oral Tab, Take 1 tablet (5 mg total) by mouth every morning before breakfast., Disp: 90 tablet, Rfl: 0    albuterol 108 (90 Base) MCG/ACT Inhalation Aero Soln, TAKE 2 PUFFS BY MOUTH EVERY 4 TO 6 HOURS AS NEEDED, Disp: 6.7 each, Rfl: 5    BD PEN NEEDLE KADEN 2ND GEN 32G X 4 MM Does not apply Misc, 1 EACH DAILY. USE DAILY WITH INSULIN, Disp: 100 each, Rfl: 1    montelukast 10 MG Oral Tab, Take 1 tablet (10 mg total) by mouth nightly. (Patient not taking: Reported on 3/31/2025), Disp: 90 tablet, Rfl: 3    Blood Glucose Monitoring Suppl (BLOOD GLUCOSE SYSTEM GILLES) Does not apply Kit, Dx. E11.9. Checks qam., Disp: 1 kit, Rfl: 0    Blood Glucose Monitoring Suppl w/Device Does not apply Kit, Dx. E11.9. Checks qam., Disp: 1 kit, Rfl: 0    Blood Gluc Meter Disp-Strips Does not apply Device, Dx. E11.9. Checks qam., Disp: 100 each, Rfl: 0    Accu-Chek FastClix Lancets Does not apply Misc, Check sugar once daily as directed (E11.42), Disp: 100 each, Rfl: 2    Blood Glucose Monitoring Suppl (ONETOUCH VERIO) w/Device Does not apply Kit, 1 each daily. Test 1x daily, Disp: 1 kit, Rfl: 0    OneTouch Delica Lancets 33G  Does not apply Misc, 4 x daily, Disp: 100 each, Rfl: 1    acetaminophen 500 MG Oral Tab, Take 1 tablet (500 mg total) by mouth daily. (Patient not taking: Reported on 3/31/2025), Disp: , Rfl:     Allergies:  Allergies   Allergen Reactions    Celebrex [Celecoxib] PALPITATIONS    Penicillins ITCHING and SWELLING    Amlodipine OTHER (SEE COMMENTS)     Calves Swelling     Metformin And Related UNKNOWN    Olmesartan UNKNOWN        Review of Systems:Oncology specific ROS negative except as per HPI    There were no vitals taken for this visit.  Wt Readings from Last 6 Encounters:   03/31/25 63 kg (139 lb)   03/20/25 63 kg (139 lb)   03/17/25 63.4 kg (139 lb 12.8 oz)   03/10/25 61.1 kg (134 lb 12.8 oz)   02/05/25 60.1 kg (132 lb 6.4 oz)   01/17/25 67.1 kg (147 lb 14.9 oz)   General: Patient is alert and oriented x 3, not in acute distress.  HEENT: EOMI, MMM  Chest: Course BS to auscultation bilaterally.   Abdomen: soft, non tender, non distended  Extremities: no signs of LE edema  Neurological: Strength is grossly intact. Moving all extremities. Gait appropriate.   Psych/Depression: Appropriate mood and affect            Lab Results   Component Value Date    WBC 9.5 03/27/2025    RBC 2.93 (L) 03/27/2025    HGB 7.8 (L) 03/27/2025    HCT 26.1 (L) 03/27/2025    MCV 89.1 03/27/2025    MCH 26.6 03/27/2025    MCHC 29.9 (L) 03/27/2025    RDW 21.2 (H) 03/27/2025    .0 03/27/2025     Lab Results   Component Value Date     (H) 03/27/2025    BUN 13 03/27/2025    BUNCREA 13.5 03/27/2025    CREATSERUM 0.96 03/27/2025    ANIONGAP 8 03/27/2025    GFRNAA 47 (L) 05/02/2022    GFRAA 54 (L) 05/02/2022    CA 8.7 03/27/2025    OSMOCALC 301 (H) 03/27/2025    ALKPHO 65 03/27/2025    AST 22 03/27/2025    ALT 10 03/27/2025    BILT 0.3 03/27/2025    TP 6.5 03/27/2025    ALB 3.4 03/27/2025    GLOBULIN 3.1 03/27/2025     03/27/2025    K 3.6 03/27/2025     03/27/2025    CO2 26.0 03/27/2025       Lab Results   Component Value  Date    ROSALIE 117 03/27/2025     Lab Results   Component Value Date    IRON 145 03/27/2025     Lab Results   Component Value Date    IRON 145 03/27/2025    TRANSFERRIN 205 (L) 03/27/2025    TIBCP 277 03/27/2025    SAT 52 (H) 03/27/2025       Impression and Plan:  85 year old with chronic iron deficiency anemia in the setting of bleeding AVMs s/p repeat endoscopic treatment, Iv iron treatment and rbc tfx 5/2022,    1.) BUZZ from AVM's    - previously CAPUTO has been a hallmark for symptomatic anemia for her, also has worsening fatigue and some lightheadedness  --pt received venofer in 4/24   --pt has improved symptoms with monthly octreotide by Dr. Garcia in GI for continued blood loss;   -11/22/24 Last IV iron 10/9-10/31/24 (Venofer)        -Patient denies blood loss from any orifice that she can see. Continues on octreotide via GI  -goal is to keep Prudencio's iron saturation over 25% as well as ferritin in normal range and over 100 would be great; labs are currently at goal, does not need oral or IV at this time    -I will check her labs every 2 wks and she will be seen in 4 wks      2.) HTN  --better control, cont to f/u with her PCP      3) History of PE  --found on 2/23/24; not on anticoagulation in light of recent GI bleeding   --IVC filter placed on 3/7/24 by IR; she was rec for IVC filter retreival in 3-6 mo by IR, so this was removed  in 6/24  --her PE was noted to be resolved by CT chest on 4/26      4.) COPD/pulm fibrosis  -recent hospitalization and pneumonia 10/22/24, now resolved  -followed by Dr. Parker, now has nebulizer for albuterol      5.) UTI  -RESOLVED    MDM: Moderate Risk; ongoing likely GI bleeding from AVM's, HTN, acute PE, IVC filter placement    Eric Mohamud MD  [facility]  [facility] [place]  [address]  [phone]

## 2025-04-04 ENCOUNTER — OFFICE VISIT (OUTPATIENT)
Age: 85
End: 2025-04-04

## 2025-04-04 VITALS
SYSTOLIC BLOOD PRESSURE: 144 MMHG | HEART RATE: 86 BPM | HEIGHT: 61 IN | DIASTOLIC BLOOD PRESSURE: 66 MMHG | BODY MASS INDEX: 26.62 KG/M2 | WEIGHT: 141 LBS

## 2025-04-04 DIAGNOSIS — M05.79 RHEUMATOID ARTHRITIS INVOLVING MULTIPLE SITES WITH POSITIVE RHEUMATOID FACTOR (HCC): Primary | ICD-10-CM

## 2025-04-04 DIAGNOSIS — Z51.81 MEDICATION MONITORING ENCOUNTER: ICD-10-CM

## 2025-04-04 NOTE — PROGRESS NOTES
Chester Bautista is a 85 year old female.    HPI:     Chief Complaint   Patient presents with    Rheumatoid Arthritis       I had the pleasure of seeing Chester Bautista on 4/4/2025 for follow up RA.     Current medications:  Cimzia injection- first dose 1/2025  Prednisone 5 mg daily- restarted in aug 2021   mg daily- started 2/27/2023  Gabapentin 100 mg nightly   Tylenol arthritis 2 pills daily  Previous medications:  SSZ 1000 mg bid- started 11/2021-1/2021 had HA  Methotrexate- took in [place]   Cymbalta 40 mg daily for neuropathy   Leflunomide 20 mg daily- started jan 2022- stopped 2/2023, had pain in b/l feet with n/t  Blood work:  , CCP>340  ESR 85  Neg ACE    Interval History:  This is a 82 yo F with hx of HTN, HLD, Breast cancer s/p lumpectomy and radiation (2017), ?Sarcoidosis, B/L TKR, B/L shoulder replacement presents to establish care for RA.  She just moved from [place] about a month ago to [place].  Her  passed away in February so there has been a lot of stress in her life.  Diagnosed with RA back in 2003.  At that time she reports stiffness and pain in all her joints.  She could hardly get out of bed.  Was hard to even make a fist or raise her arms.  She was on Celebrex at that time and methotrexate.  Was never put on any Biologics.  She was then put on prednisone 20 mg daily and was on chronic prednisone for about 1.5 yrs.  Was tapered down to approximately 3 mg daily.  She then went off of prednisone for about 4 to 5 years and was doing well up until recently.  She restarted prednisone when she was in [place] around August 2021 due to her joint pain.  She was having pain in her hands, wrists, knees and ankles and swelling.  She was started on prednisone 3 mg daily.  In September she developed a cough and shortness of breath.  Per patient she was seen by pulmonology and they diagnosed her with sarcoidosis based on her chest x-ray and CAT scan.  No prior  history of sarcoidosis.  No history of uveitis, erythema nodosum, fevers or night sweats.  She does report weight loss about 20 pounds from February till November but she has not been eating well and her  passed away recently.  Her prednisone was increased to 5 mg daily but now she is tapered down to 3 mg daily.  She has an appointment with pulmonology in January.  Do not have records of her chest x-ray or CT scan.  Cough has resolved.  Has minimal shortness of breath.    1/10/2022:   Presents for follow-up of rheumatoid arthritis  Currently on sulfasalazine at 1000 mg twice a day for the past month.  Also on prednisone 3 mg daily  Continues to have diffuse joint pain and swelling  Currently has swelling and pain in her left MCP and wrists.  Bilateral ankles.  Reports a lot of stiffness in the morning.  Sulfasalazine helped only mildly, also caused daily headaches    2/28/2022:  She presents for follow-up of rheumatoid arthritis  During her last visit in January she had a lot of joint pain and swelling involving her hands and wrists  She was started on high-dose of prednisone and now on prednisone 5 mg daily  She was also started leflunomide 20 mg daily  Joint pain and swelling has improved significantly.  Minimal pain or stiffness in her hands and wrists.  Able to make a full fist  Reports of swelling in her right foot mostly at the end of the day.  Not very painful  Inflammatory markers remain elevated, ESR 80 and CRP 1.19    5/4/2022:  She presents for follow-up of rheumatoid arthritis  Remains on leflunomide 20 mg daily and prednisone 5 mg daily  She had blood work done on 5/2 showing hemoglobin of 6.0.  She advised to go to the ED and received 2 units of blood.  She was seen by her PCP and referred to GI  Further blood work does show iron deficiency anemia. Which is already known, had iron transfusion in November 2021 and now iron pills   Joints are doing well overall, no swelling or stiffness  Has been  having more pain in both feet jeimy at night, burning sensation with the tingling.  Has to soak them in water at night.  No symptoms during the day.  She will be having an EMG done on June 28 8/8/2022:  Presents for follow-up of RA  At times she will have some stiffness in her hands in the morning but it goes away.  No swelling  Left knee will swell at times  Continues to have tingling and numbness in her feet.  She was seen by Dr. Mcnamara  EMG showed bilateral sural and medial plantar sensory nerve actions absent, it was consistent with sensorimotor peripheral neuropathy likely related to diabetes  She was on Cymbalta 40 mg daily but continues to have symptoms    11/14/2022:  Presents for follow-up of RA  Currently on Leflunomide 20 mg daily and prednisone 5 mg daily  Having stiffness and soreness in b/l hands in the morning. L hand all fingers, R hand is thumb and pinky finger  Some swelling in fingers, can make fist in AM but sore  L knee painful at times and swells, XR done showing left knee prosthesis is in place.  Both shoulders and R knee have been replaced   Also taking tylenol arthritis 650 mg 2 pills BID and does help her pain  Also getting Iron infusion for low Hgb due to AVMs    2/27/2023:  Presents for follow-up of RA  Currently on leflunomide 20 mg daily and prednisone 5 mg daily  Continues to have a lot of pain involving her hands, wrist and elbows.  Shoulders can hurt to  Also has some pain in her feet and ankles.  Reports a lot of soreness all over  At times her hands and fingers can swell.  She stopped Cymbalta and reports that her feet pain improved.  She does not have neuropathy now  Blood work showed high inflammation markers    5/4/2023:  Presents for follow-up of RA  She stopped leflunomide a couple of months ago  Also off Cymbalta  During her last visit she was placed on Plaquenil 200 mg daily, still on predisone 5 mg daily.  She had a flare in April requiring a Medrol Dosepak  Recent blood  work is showing elevated inflammation markers, ESR 64 in February ESR was 86 and CRP 2.37  Has pain daily but degree of pain is different every day  At times has swelling in hands, wrists but not daily  Since starting HCQ has been having diarrhea, has improved, goes 3 times a day  The pain in the feet and neuropathy, was on Cymbalta and helped but stopped the medication     8/10/2023:  Presents for follow-up of RA  On  mg daily and prednisone 5 mg daily  Will have some pain in the fingers and wrists, jeimy the r 5th finger, can make a fist  She was not improved for Cimzia but plan was to go through financial counselor.  She states her joints are not too bad right now    12/3/2024:  Presents for follow-up of RA  On  mg daily and prednisone 5 mg daily  Having some changes in the hands, left thumb IP joint has changed  All joints ache overall  Has been having more flares   Hard to  or open bottle of water  Also has pain in elbows, shoulder are not too bad  Knees and ankles are painful  Both knees and shoulders are replaced  No rashes or fevers  Had recent UTI and was on Abx and completed it  Blood work done and inflammation markers are high, ESR>130 and CRP 3.0 mg/dL  She is also anemic and on Fe infusions, sees hematologist  He was hospitalized Feb 2024 and had PE s/p IVC filter but now removed but not on blood thinner due to AVM bleeds  And hospitalized in Oct 2024 for SOB for COPD and fibrosis    4/4/2025:  Presents for follow-up of RA  She was put on Cimzia but only took 1 dose as she  was hospitalzied for stroke.  She is also has  mg and prednisone 5 mg daily  Hand pain is not severe  Has tingling in left hand, fingertips, happened after the stroke  Hospitalized in February for new vertigo.  Also found to have recurrent embolic strokes, went to therapy. Blood thinner contraindicated due to GI bleeds, has IVC filter placed 1/31/2025          HISTORY:  Past Medical History:    Anxiety state     Cancer (HCC)    breast cancer 2018    Cerebellar stroke (HCC)    Chronic obstructive pulmonary disease, unspecified (HCC)    Chronic obstructive pulmonary disease, unspecified (HCC)    COPD (chronic obstructive pulmonary disease) (HCC)    Cystic thyroid nodule    Diabetes (HCC)    Diabetes mellitus (HCC)    Essential hypertension    Exposure to medical diagnostic radiation    High blood pressure    High cholesterol    History of blood transfusion    low hemoglobin    Osteoarthritis    PONV (postoperative nausea and vomiting)    Pulmonary embolism (HCC)    Pulmonary emphysema (HCC)    Rheumatoid arthritis (HCC)    Stroke (HCC)      Social Hx Reviewed   Family Hx Reviewed     Medications (Active prior to today's visit):  Current Outpatient Medications   Medication Sig Dispense Refill    losartan 25 MG Oral Tab Take 1 tablet (25 mg total) by mouth daily.      insulin lispro 100 UNIT/ML Injection Solution Inject 3 Units into the skin.      glipiZIDE 10 MG Oral Tab       carvedilol 25 MG Oral Tab       Gabapentin Enacarbil  MG Oral Tab CR Take 300 mg by mouth every evening. Take every evening at 6 pm 90 tablet 1    Atogepant 30 MG Oral Tab Take 30 mg by mouth daily. 30 tablet 11    rOPINIRole 0.25 MG Oral Tab Take 1 tablet (0.25 mg total) by mouth at bedtime.      LANTUS SOLOSTAR 100 UNIT/ML Subcutaneous Solution Pen-injector Inject 14 Units into the skin every morning.      Ferrous Sulfate 325 (65 Fe) MG Oral Tab Take 1 tablet (325 mg total) by mouth daily with breakfast. 90 tablet 1    fluticasone-salmeterol 250-50 MCG/ACT Inhalation Aerosol Powder, Breath Activated Inhale 1 puff into the lungs every 12 (twelve) hours. 60 each 3    ipratropium 0.02 % Inhalation Solution Take 2.5 mL (500 mcg total) by nebulization 2 (two) times daily. 187.5 mL 2    sennosides 8.6 MG Oral Tab Take 1 tablet (8.6 mg total) by mouth 2 (two) times daily.      pantoprazole 40 MG Oral Tab EC Take 1 tablet (40 mg total) by mouth 2 (two)  times daily before meals.      meclizine 12.5 MG Oral Tab Take 1 tablet (12.5 mg total) by mouth 3 (three) times daily as needed for Dizziness.      lidocaine-menthol 4-1 % External Patch Place 1 patch onto the skin daily.      aspirin 81 MG Oral Tab EC Take 1 tablet (81 mg total) by mouth daily.      acetaminophen 325 MG Oral Tab Take 2 tablets (650 mg total) by mouth every 6 (six) hours as needed.      Cholecalciferol (VITAMIN D3) 1.25 MG (63841 UT) Oral Cap TAKE 1 CAPSULE BY MOUTH EVERY 2 WEEKS 6 capsule 1    rosuvastatin 20 MG Oral Tab Take 1 tablet (20 mg total) by mouth nightly. 90 tablet 3    magnesium oxide 400 MG Oral Tab Take 1 tablet (400 mg total) by mouth in the morning, at noon, and at bedtime. Per Dr. JENSEN Mtaa      loratadine 10 MG Oral Tab TAKE 1 TABLET BY MOUTH EVERY DAY 30 tablet 1    hydroxychloroquine 200 MG Oral Tab Take 1.5 tablets (300 mg total) by mouth daily. 135 tablet 1    predniSONE 5 MG Oral Tab Take 1 tablet (5 mg total) by mouth daily. 90 tablet 1    albuterol (2.5 MG/3ML) 0.083% Inhalation Nebu Soln Take 3 mL (2.5 mg total) by nebulization every 6 (six) hours as needed for Wheezing. 360 mL 3    albuterol 108 (90 Base) MCG/ACT Inhalation Aero Soln TAKE 2 PUFFS BY MOUTH EVERY 4 TO 6 HOURS AS NEEDED 6.7 each 5    BD PEN NEEDLE KADEN 2ND GEN 32G X 4 MM Does not apply Misc 1 EACH DAILY. USE DAILY WITH INSULIN 100 each 1    montelukast 10 MG Oral Tab Take 1 tablet (10 mg total) by mouth nightly. 90 tablet 3    Blood Glucose Monitoring Suppl (BLOOD GLUCOSE SYSTEM GILLES) Does not apply Kit Dx. E11.9. Checks qam. 1 kit 0    Blood Glucose Monitoring Suppl w/Device Does not apply Kit Dx. E11.9. Checks qam. 1 kit 0    Blood Gluc Meter Disp-Strips Does not apply Device Dx. E11.9. Checks qam. 100 each 0    Accu-Chek FastClix Lancets Does not apply Misc Check sugar once daily as directed (E11.42) 100 each 2    Blood Glucose Monitoring Suppl (ONETOUCH VERIO) w/Device Does not apply Kit 1 each daily. Test  1x daily 1 kit 0    OneTouch Delica Lancets 33G Does not apply Misc 4 x daily 100 each 1    acetaminophen 500 MG Oral Tab Take 1 tablet (500 mg total) by mouth daily.      glipiZIDE 5 MG Oral Tab Take 1 tablet (5 mg total) by mouth every morning before breakfast. (Patient not taking: Reported on 4/4/2025) 90 tablet 0     .cmed  Allergies:  Allergies   Allergen Reactions    Celebrex [Celecoxib] PALPITATIONS    Penicillins ITCHING and SWELLING    Amlodipine OTHER (SEE COMMENTS)     Calves Swelling     Metformin And Related UNKNOWN    Olmesartan UNKNOWN         ROS:   All other ROS are negative.     PHYSICAL EXAM:   GEN: AAOx3, NAD  HEENT: EOMI, PERRLA, no injection or icterus, oral mucosa moist, no oral lesions. No lymphadenopathy. No facial rash  CVS: RRR, no murmurs rubs or gallops. Equal 2+ distal pulses.   LUNGS: CTAB, no increased work of breathing  ABDOMEN:  soft NT/ND, +BS, no HSM  SKIN: No rashes or skin lesions. No nail findings  MSK:  Cervical spine: FROM  Hands: No swelling in her MCPs, able to make a full fist bilaterally, TTP in PIPs and MCPS  Wrist: No swelling in the wrist, nontender to palpation  Elbow: FROM, no pain or swelling or warmth on palpation  Shoulders: R shoulder limited ROM  Hip: normal log roll, no lateral hip pain, GLORY test negative b/l  Knees: FROM, no warmth or effusion present. No pain with ROM.   Ankles: no swelling  Feet: no pain with MTP squeeze, no toe swelling or pain or warmth on palpation with FROM  Spine: no lumbar or sacral pain on palpation.  NEURO: Cranial nerves II-XII intact grossly. 5/5 strength throughout in both upper and lower extremities, sensation intact.  PSYCH: normal mood       LABS:     Component      Latest Ref Rng & Units 11/7/2021   SED RATE      0 - 30 mm/Hr 85 (H)   C-Citrullinated Peptide IgG AB      0.0 - 6.9 U/mL >340.0 (H)   RHEUMATOID FACTOR      <15 IU/mL 745 (H)     Imaging:     None    ASSESSMENT/PLAN:     Seropositive RA- improved   - Currently on  hydroxychloroquine 200 mg daily and prednisone 5 mg daily  - she received 1 dose of Cimzia 1/2025 but then hospitalized for stroke, joint pain and swelling has improved   - she would like to stop HCQ, will stop and continue Cimzia and prednisone 5 mg daily  - Off leflunomide as it caused worsening neuropathy  - had s/e with methotrexate and it was ineffective   - blood work reviewed     Recurrent CVA  - she is following with neurology   - on ASA 81 mg daily    Fe def anemia, AVM bleed s/p endoscopic treatment  - now on octreotide treatment    Hx of PE  - she was hospitalized February 2024 for PE s/p IVC filter, now removed  - cannot go on blood thinners due to AVM bleed    COPD and Pulmonary fibrosis  - following with pulmonology     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  Getting IV iron infusions    Peripheral neuropathy- stable, resolved   - Was seen by Dr. 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  Wondering if she needs to go on treatment for the positive hepatitis B    Pt will f/u in 3 mos     There is a longitudinal care relationship with me, the care plan reflects the ongoing nature of the continuous relationship of care, and the medical record indicates that there is ongoing treatment of a serious/complex medical condition which I am currently managing.  is Applicable.     Ashia Goodrich MD  4/4/2025  12:54 PM

## 2025-04-04 NOTE — PATIENT INSTRUCTIONS
You were seen today for rheumatoid arthritis  Joints overall are doing better  Plan to continue Cimzia every month  Will get your second dose today  You can stop the hydroxychloroquine  Continue prednisone 5 mg daily  Follow-up in 3 to 4 months

## 2025-04-10 ENCOUNTER — TELEPHONE (OUTPATIENT)
Age: 85
End: 2025-04-10

## 2025-04-10 ENCOUNTER — NURSE ONLY (OUTPATIENT)
Age: 85
End: 2025-04-10
Attending: INTERNAL MEDICINE
Payer: MEDICARE

## 2025-04-10 ENCOUNTER — OFFICE VISIT (OUTPATIENT)
Dept: ENDOCRINOLOGY CLINIC | Facility: CLINIC | Age: 85
End: 2025-04-10

## 2025-04-10 VITALS
BODY MASS INDEX: 27.61 KG/M2 | HEIGHT: 60 IN | WEIGHT: 140.63 LBS | SYSTOLIC BLOOD PRESSURE: 139 MMHG | HEART RATE: 77 BPM | DIASTOLIC BLOOD PRESSURE: 61 MMHG

## 2025-04-10 DIAGNOSIS — E11.65 UNCONTROLLED TYPE 2 DIABETES MELLITUS WITH HYPERGLYCEMIA (HCC): ICD-10-CM

## 2025-04-10 DIAGNOSIS — E11.649 TYPE 2 DIABETES MELLITUS WITH HYPOGLYCEMIA WITHOUT COMA, WITH LONG-TERM CURRENT USE OF INSULIN (HCC): Primary | ICD-10-CM

## 2025-04-10 DIAGNOSIS — D50.9 IRON DEFICIENCY ANEMIA, UNSPECIFIED IRON DEFICIENCY ANEMIA TYPE: Primary | ICD-10-CM

## 2025-04-10 DIAGNOSIS — Z79.4 TYPE 2 DIABETES MELLITUS WITH HYPOGLYCEMIA WITHOUT COMA, WITH LONG-TERM CURRENT USE OF INSULIN (HCC): Primary | ICD-10-CM

## 2025-04-10 DIAGNOSIS — Q27.30 AVM (ARTERIOVENOUS MALFORMATION) (HCC): ICD-10-CM

## 2025-04-10 LAB
AMB EXT GMI: 7 %
GLUCOSE BLOOD: 294
HEMOGLOBIN A1C: 5.6 % (ref 4.3–5.6)
TEST STRIP EXPIRATION DATE: NORMAL DATE
TEST STRIP LOT #: NORMAL NUMERIC

## 2025-04-10 PROCEDURE — 3078F DIAST BP <80 MM HG: CPT | Performed by: NURSE PRACTITIONER

## 2025-04-10 PROCEDURE — 82947 ASSAY GLUCOSE BLOOD QUANT: CPT | Performed by: NURSE PRACTITIONER

## 2025-04-10 PROCEDURE — 1159F MED LIST DOCD IN RCRD: CPT | Performed by: NURSE PRACTITIONER

## 2025-04-10 PROCEDURE — 3008F BODY MASS INDEX DOCD: CPT | Performed by: NURSE PRACTITIONER

## 2025-04-10 PROCEDURE — 95251 CONT GLUC MNTR ANALYSIS I&R: CPT | Performed by: NURSE PRACTITIONER

## 2025-04-10 PROCEDURE — 3075F SYST BP GE 130 - 139MM HG: CPT | Performed by: NURSE PRACTITIONER

## 2025-04-10 PROCEDURE — 83036 HEMOGLOBIN GLYCOSYLATED A1C: CPT | Performed by: NURSE PRACTITIONER

## 2025-04-10 PROCEDURE — 99214 OFFICE O/P EST MOD 30 MIN: CPT | Performed by: NURSE PRACTITIONER

## 2025-04-10 PROCEDURE — 1160F RVW MEDS BY RX/DR IN RCRD: CPT | Performed by: NURSE PRACTITIONER

## 2025-04-10 RX ORDER — GLIPIZIDE 10 MG/1
10 TABLET ORAL
Qty: 90 TABLET | Refills: 1 | Status: SHIPPED | OUTPATIENT
Start: 2025-04-10

## 2025-04-10 RX ORDER — OCTREOTIDE ACETATE 20 MG
20 KIT INTRAMUSCULAR ONCE
Status: COMPLETED | OUTPATIENT
Start: 2025-04-10 | End: 2025-04-10

## 2025-04-10 RX ORDER — OCTREOTIDE ACETATE 20 MG
20 KIT INTRAMUSCULAR ONCE
OUTPATIENT
Start: 2025-05-08

## 2025-04-10 RX ADMIN — OCTREOTIDE ACETATE 20 MG: 20 MG KIT INTRAMUSCULAR at 14:46:00

## 2025-04-10 NOTE — PROGRESS NOTES
Name: Chester Bautista  Date: 4/10/2025    CHIEF COMPLAINT   Chief Complaint   Patient presents with    Diabetes     Follow up and A1c check        HISTORY OF PRESENT ILLNESS   Chester Bautista is a 85 year old female who presents for follow up on diabetes management. Patient is accompanied by her daughter Tommy today.   HbA1C: 5.6% at POC today. Previously was 9.7% on 1/16/2025.   Blood glucose is: 294 in clinic today.     FAMILY HISTORY OF DIABETES  -paternal grandmother and paternal aunt.   DIABETES HISTORY  Diagnosed: had prediabetes since 2003; diagnosed with T2DM 11/7/21;   Prior HbA, C or glycohemoglobin were 7.2% on 11/7/2021;  7.7% 2/26/2022; 7.3% 5/2/2022; 6.0% 9/1/2022; 6.4% 3/21/2023; 8.1% 6/29/2023; 7.0% 10/18/2023; 9.5% on 2/2/2024; 9.0% 5/21/2024; 10.3% 7/19/2024; 10.3% 8/27/2024; 10.3% on 10/14/2024; 7.6% 11/13/2024; 9.7% 1/16/2025; 5.6% at POC today;     PREVIOUS MEDICATION FOR DM:  Metformin -d/c'ed due to allergic reaction - swelling to bottom of lip and tongue itching; also vomiting.   - 70/30 insulin: d/c'ed due to lack of glycemic control     CURRENT MEDICATIONS FOR DM:  Glipizide 10mg daily with breakfast  Lantus 14 units daily with dinner     HOME GLUCOSE READINGS:   Freestyle van 3 CGM download reviewed     Report Period: 03/14/2025 - 04/10/2025 (28 days)  Generated: 04/10/2025  Time CGM Active: 81%        Glucose Statistics and Targets  Average Glucose: 153 mg/dL  Glucose Management Indicator (GMI): 7.0%  Glucose Variability (%CV): 42.3%  Target Range: 70 - 180 mg/dL        Time in Ranges  Very High: >250 mg/dL --- 10%  High: 181 - 250 mg/dL --- 21%  Target Range: 70 - 180 mg/dL --- 65%  Low: 54 - 69 mg/dL --- 4%  Very Low: <54 mg/dL --- 0%       Continuous Glucose Monitoring Interpretation    Chester Bautista has undergone continuous glucose monitoring with the personal Freestyle van 3 continuous glucose monitor.  The blood glucose tracings were evaluated for  two weeks prior to office visit. Her blood glucose tracings demonstrated some postprandial hyperglycemia at meal times, however significant hypoglycemia occurring between meals with glucose readings in 50s (lowest). As a result of her testing her medications were adjusted per below.     HISTORY OF DIABETES COMPLICATIONS:  History of Retinopathy: no  - last eye exam within the last 12 months: yes  History of Neuropathy: no   History of Nephropathy: no     ASSOCIATED COMPLICATIONS:   HTN: yes   Hyperlipidemia: yes   Cardiovascular Disease: no   Peripheral Vascular Disease: no     DIETARY COMPLIANCE:  Good; has been following a low carb diet   - eating less snacks and sweets     EXERCISE:   No     Polyuria, polyphagia, polydipsia: no   Paresthesias: no   Blurred vision: no   Recent steroids, illness or infections: yes  - on prednisone for pain - RA; has been taking since 7-9/2021    REVIEW OF SYSTEMS  Constitutional: Negative for: weight change, fever, fatigue, cold/heat intolerance  Eyes: Negative for:  Visual changes, proptosis, blurring  ENT: Negative for:  dysphagia, neck swelling, dysphonia  Respiratory: Negative for: hemoptysis, shortness of breath, cough, or dyspnea.  Cardiovascular: Negative for:  chest pain, chest discomfort, palpitations  GI: Negative for:  abdominal pain, nausea, vomiting, diarrhea, heartburn, constipation  Neurology: Negative for: headache, dizziness, syncope, numbness/tingling, or weakness.   Genito-Urinary: Negative for: dysuria, frequency or hematuria   Hematology/Lymphatics: Negative for: bruising, easy bleeding, lower extremity edema  Skin: Negative for: rash, blister, infection or ulcers.  Endocrine: Negative for: polyuria, polydipsia. No osteoporosis. No thyroid disease.     MEDICATIONS:     Current Outpatient Medications:     glipiZIDE 10 MG Oral Tab, , Disp: , Rfl:     LANTUS SOLOSTAR 100 UNIT/ML Subcutaneous Solution Pen-injector, Inject 14 Units into the skin every morning.,  Disp: , Rfl:     losartan 25 MG Oral Tab, Take 1 tablet (25 mg total) by mouth daily., Disp: , Rfl:     insulin lispro 100 UNIT/ML Injection Solution, Inject 3 Units into the skin. (Patient not taking: Reported on 4/10/2025), Disp: , Rfl:     carvedilol 25 MG Oral Tab, , Disp: , Rfl:     Gabapentin Enacarbil  MG Oral Tab CR, Take 300 mg by mouth every evening. Take every evening at 6 pm, Disp: 90 tablet, Rfl: 1    Atogepant 30 MG Oral Tab, Take 30 mg by mouth daily., Disp: 30 tablet, Rfl: 11    rOPINIRole 0.25 MG Oral Tab, Take 1 tablet (0.25 mg total) by mouth at bedtime., Disp: , Rfl:     Ferrous Sulfate 325 (65 Fe) MG Oral Tab, Take 1 tablet (325 mg total) by mouth daily with breakfast., Disp: 90 tablet, Rfl: 1    fluticasone-salmeterol 250-50 MCG/ACT Inhalation Aerosol Powder, Breath Activated, Inhale 1 puff into the lungs every 12 (twelve) hours., Disp: 60 each, Rfl: 3    ipratropium 0.02 % Inhalation Solution, Take 2.5 mL (500 mcg total) by nebulization 2 (two) times daily., Disp: 187.5 mL, Rfl: 2    sennosides 8.6 MG Oral Tab, Take 1 tablet (8.6 mg total) by mouth 2 (two) times daily., Disp: , Rfl:     pantoprazole 40 MG Oral Tab EC, Take 1 tablet (40 mg total) by mouth 2 (two) times daily before meals., Disp: , Rfl:     meclizine 12.5 MG Oral Tab, Take 1 tablet (12.5 mg total) by mouth 3 (three) times daily as needed for Dizziness., Disp: , Rfl:     lidocaine-menthol 4-1 % External Patch, Place 1 patch onto the skin daily., Disp: , Rfl:     aspirin 81 MG Oral Tab EC, Take 1 tablet (81 mg total) by mouth daily., Disp: , Rfl:     acetaminophen 325 MG Oral Tab, Take 2 tablets (650 mg total) by mouth every 6 (six) hours as needed., Disp: , Rfl:     Cholecalciferol (VITAMIN D3) 1.25 MG (76146 UT) Oral Cap, TAKE 1 CAPSULE BY MOUTH EVERY 2 WEEKS, Disp: 6 capsule, Rfl: 1    rosuvastatin 20 MG Oral Tab, Take 1 tablet (20 mg total) by mouth nightly., Disp: 90 tablet, Rfl: 3    magnesium oxide 400 MG Oral Tab,  Take 1 tablet (400 mg total) by mouth in the morning, at noon, and at bedtime. Per Dr. JENSEN Mata, Disp: , Rfl:     loratadine 10 MG Oral Tab, TAKE 1 TABLET BY MOUTH EVERY DAY, Disp: 30 tablet, Rfl: 1    hydroxychloroquine 200 MG Oral Tab, Take 1.5 tablets (300 mg total) by mouth daily., Disp: 135 tablet, Rfl: 1    predniSONE 5 MG Oral Tab, Take 1 tablet (5 mg total) by mouth daily., Disp: 90 tablet, Rfl: 1    albuterol (2.5 MG/3ML) 0.083% Inhalation Nebu Soln, Take 3 mL (2.5 mg total) by nebulization every 6 (six) hours as needed for Wheezing., Disp: 360 mL, Rfl: 3    glipiZIDE 5 MG Oral Tab, Take 1 tablet (5 mg total) by mouth every morning before breakfast. (Patient not taking: Reported on 4/4/2025), Disp: 90 tablet, Rfl: 0    albuterol 108 (90 Base) MCG/ACT Inhalation Aero Soln, TAKE 2 PUFFS BY MOUTH EVERY 4 TO 6 HOURS AS NEEDED, Disp: 6.7 each, Rfl: 5    BD PEN NEEDLE KADEN 2ND GEN 32G X 4 MM Does not apply Misc, 1 EACH DAILY. USE DAILY WITH INSULIN, Disp: 100 each, Rfl: 1    montelukast 10 MG Oral Tab, Take 1 tablet (10 mg total) by mouth nightly., Disp: 90 tablet, Rfl: 3    Blood Glucose Monitoring Suppl (BLOOD GLUCOSE SYSTEM GILLES) Does not apply Kit, Dx. E11.9. Checks qam., Disp: 1 kit, Rfl: 0    Blood Glucose Monitoring Suppl w/Device Does not apply Kit, Dx. E11.9. Checks qam., Disp: 1 kit, Rfl: 0    Blood Gluc Meter Disp-Strips Does not apply Device, Dx. E11.9. Checks qam., Disp: 100 each, Rfl: 0    Accu-Chek FastClix Lancets Does not apply Misc, Check sugar once daily as directed (E11.42), Disp: 100 each, Rfl: 2    Blood Glucose Monitoring Suppl (ONETOUCH VERIO) w/Device Does not apply Kit, 1 each daily. Test 1x daily, Disp: 1 kit, Rfl: 0    OneTouch Delica Lancets 33G Does not apply Misc, 4 x daily, Disp: 100 each, Rfl: 1    acetaminophen 500 MG Oral Tab, Take 1 tablet (500 mg total) by mouth daily., Disp: , Rfl:     ALLERGIES:   Allergies   Allergen Reactions    Celebrex [Celecoxib] PALPITATIONS     Penicillins ITCHING and SWELLING    Amlodipine OTHER (SEE COMMENTS)     Calves Swelling     Metformin And Related UNKNOWN    Olmesartan UNKNOWN       SOCIAL HISTORY:   Social History     Socioeconomic History    Marital status:    Tobacco Use    Smoking status: Former     Current packs/day: 0.00     Types: Cigarettes     Quit date:      Years since quittin.2     Passive exposure: Past    Smokeless tobacco: Never    Tobacco comments:     [id number]   Vaping Use    Vaping status: Never Used   Substance and Sexual Activity    Alcohol use: Never    Drug use: Never     PAST MEDICAL HISTORY:   Past Medical History:    Anxiety state    Cancer (HCC)    breast cancer 2018    Cerebellar stroke (HCC)    Chronic obstructive pulmonary disease, unspecified (HCC)    Chronic obstructive pulmonary disease, unspecified (HCC)    COPD (chronic obstructive pulmonary disease) (HCC)    Cystic thyroid nodule    Diabetes (HCC)    Diabetes mellitus (HCC)    Essential hypertension    Exposure to medical diagnostic radiation    High blood pressure    High cholesterol    History of blood transfusion    low hemoglobin    Osteoarthritis    PONV (postoperative nausea and vomiting)    Pulmonary embolism (HCC)    Pulmonary emphysema (HCC)    Rheumatoid arthritis (HCC)    Stroke (HCC)       PAST SURGICAL HISTORY:   Past Surgical History:   Procedure Laterality Date    Cataract extraction w/  intraocular lens implant Bilateral 2017    Dr in [facility]     Colonoscopy      Colonoscopy N/A 2022    Procedure: COLONOSCOPY;  Surgeon: Mikey Garcia MD;  Location: [facility] ENDOSCOPY    Colonoscopy N/A 06/15/2023    Procedure: COLONOSCOPY;  Surgeon: Mikey Garcia MD;  Location: [facility] ENDOSCOPY    Correct bunion,simple      right foot      Egd  2023    Dr. Garcia; Duodenal AVM's x4 cauterized    Hysterectomy      , no abnormal Paps, due to heavy bleeding with fibroids.  Not sure if it had bilateral salpingo-oophorectomy.     Ir aneurysm repairm  2010    Computer assisted volumetric craniofomy with clipping of anterior cerebral artery.    Lumpectomy right      Radiation right      Rotator cuff repair      1993    Total knee replacement Right 2010    Total knee replacement Left 07/02/2015    Transoral incisionless fundoplication - internal  01/25/2012 Fredy fundoplication at [facility] Dr. Fournier.    Fredy fundoplication at [facility] Dr. Fournier.    Trigger finger release      left hand  2005    Yag capsulotomy - ou - both eyes Bilateral 09/2021    done in [place]       PHYSICAL EXAM:   Vitals:    04/10/25 1327   BP: 139/61   Pulse: 77   Weight: 140 lb 9.6 oz (63.8 kg)   Height: 5' (1.524 m)     BMI:   Body mass index is 27.46 kg/m².    General Appearance:  alert, well developed, in no acute distress  Nutritional:  no extreme weight gain or loss  Head: Atraumatic  Eyes:  normal conjunctivae, sclera., normal sclera and normal pupils  Throat/Neck: normal sound to voice. Normal hearing, normal speech  Back: no kyphosis  Respiratory:  Speaking in full sentences, non-labored. no increased work of breathing, no audible wheezing    Skin:  normal moisture and skin texture, no visible lesions  Hair and nails: normal scalp hair  Hematologic:  no excessive bruising  Neuro: motor grossly intact, moving all extremities without difficulty  Psychiatric:  oriented to time, self, and place  Extremities: no obvious extremity swelling, no lesions    LABS: Pertinent labs reviewed    ASSESSMENT/PLAN:    -Reviewed with patient the pathogenesis of diabetes, clinical significance of A1c, and common complications such as: microvascular, macrovascular and diabetic ketoacidosis. Patient verbalizes understanding of the importance of glycemic control and the goals of therapy.     1.Type 2 Diabetes Mellitus, uncontrolled with long term insulin use   -LAB DATA  HbA,C: 5.6% today   a) Medications  -  continue with Glipizide 5mg daily with  breakfast   -  decrease Lantus 14 --> 7 units nightly - Risks and benefits reviewed. Verbalizes understanding.    - patient was reminded again to call the office to notify me right away if having abnormal glucose readings.   - discussed if she develops higher postprandial hyperglycemia, will consider adding trulicity rx. She will call to notify me.   - discussed to continue to follow a low carb diet   - discussed to continue to stay active as currently doing   -reviewed target goal BG readings and A1C  -reviewed when to call and notify me of abnormal BG readings.     b) No nephropathy: GFR: 58 on 3/27/2025 and urine MA: 994.7 on 10/25/2024  c) UTD with optho - last apt with Dr. Melo was 2024  D)foot exam: normal on 2025  e) cont using freestyle van 3 cgm   f) Life style changes reviewed    2.dyslipidemia   -LDL: 32 and Tri on 3/27/2025  - rosuvastatin 20mg at bedtime       RTC in 3 months    Patient instructed to call sooner if they develop Blood glucose readings <75 and/or if they have readings persistently >200.     The risks and benefits of my recommendations were discussed with the patient today. questions were also answered to the best of my knowledge. Patient verbalizes understanding of these issues and agrees to the plan.    4/10/2025  BILL Swain

## 2025-04-10 NOTE — PATIENT INSTRUCTIONS
A1C: 5.6% today --> previously was 9.7% on 1/16/2025  Blood glucose: 294 in clinic today    Medications:   -  continue with Glipizide 10mg daily with breakfast   -  decrease Lantus 14 --> 7 units daily in the morning       - continue to follow a low carb diet   - continue to stay active as currently doing

## 2025-04-10 NOTE — PROGRESS NOTES
Name: Chester Bautista  YOB: 1940  Report Period: 03/14/2025 - 04/10/2025 (28 days)  Generated: 04/10/2025  Time CGM Active: 81%      Glucose Statistics and Targets  Average Glucose: 153 mg/dL  Glucose Management Indicator (GMI): 7.0%  Glucose Variability (%CV): 42.3%  Target Range: 70 - 180 mg/dL      Time in Ranges  Very High: >250 mg/dL --- 10%  High: 181 - 250 mg/dL --- 21%  Target Range: 70 - 180 mg/dL --- 65%  Low: 54 - 69 mg/dL --- 4%  Very Low: <54 mg/dL --- 0%

## 2025-04-16 NOTE — TELEPHONE ENCOUNTER
I have been in communication with prior authorization team  Patient's plan canceled the request that we submitted so another one was submitted as urgent.

## 2025-04-17 NOTE — TELEPHONE ENCOUNTER
Nursing staff to have the patient follow-up with me in the office, I would like to see her annually.  Okay to go ahead and start on the octreotide and or continue.  The main way to follow this would be with tracking her blood counts, infusions and/or IV iron as needed through hematology.

## 2025-04-17 NOTE — TELEPHONE ENCOUNTER
I let the patient's daughter Brea know that the Octreotide was approved.  She had called for an update before.  Routing to point person.    Dr. Green is asking if Prudencio needs any further workup from you to see if bleeding.  She had a blood transfusion about a month ago.  Last CBC on 4/3 in [facility].  She is set to get a repeat CBC and to start iron infusions on 4/21/25.  Unsure if needs evaluation with you or if just watching for change in labs or symptoms at this time.    Thank you

## 2025-04-17 NOTE — TELEPHONE ENCOUNTER
Dr. Garcia    Is first available ok or do you want us to add in res24 or overbook spot?    Thank you

## 2025-04-17 NOTE — TELEPHONE ENCOUNTER
Jb smart,     Approval received for Octreotide Acetate  Authorization: [id number]  Valid from: 03/11/24---12/31/25      Thank You,  lulu

## 2025-04-18 NOTE — TELEPHONE ENCOUNTER
Spoke to patients daughter Brea (HIPAA verified) and relayed message from Dr. Garcia. Daughter verbalized understanding. First available appointment offered and scheduled. Daughter had no further questions at this time.

## 2025-04-21 ENCOUNTER — OFFICE VISIT (OUTPATIENT)
Age: 85
End: 2025-04-21
Attending: INTERNAL MEDICINE
Payer: MEDICARE

## 2025-04-21 VITALS
WEIGHT: 142.19 LBS | DIASTOLIC BLOOD PRESSURE: 70 MMHG | BODY MASS INDEX: 26.85 KG/M2 | SYSTOLIC BLOOD PRESSURE: 155 MMHG | OXYGEN SATURATION: 92 % | TEMPERATURE: 98 F | RESPIRATION RATE: 20 BRPM | HEIGHT: 61 IN | HEART RATE: 98 BPM

## 2025-04-21 DIAGNOSIS — N18.2 CKD (CHRONIC KIDNEY DISEASE) STAGE 2, GFR 60-89 ML/MIN: ICD-10-CM

## 2025-04-21 DIAGNOSIS — K55.21 AVM (ARTERIOVENOUS MALFORMATION) OF SMALL BOWEL, ACQUIRED WITH HEMORRHAGE: ICD-10-CM

## 2025-04-21 DIAGNOSIS — E83.42 HYPOMAGNESEMIA: ICD-10-CM

## 2025-04-21 DIAGNOSIS — D50.0 IRON DEFICIENCY ANEMIA DUE TO CHRONIC BLOOD LOSS: Primary | ICD-10-CM

## 2025-04-21 DIAGNOSIS — K90.9 MALABSORPTION OF IRON (HCC): ICD-10-CM

## 2025-04-21 DIAGNOSIS — D64.9 ANEMIA, UNSPECIFIED TYPE: ICD-10-CM

## 2025-04-21 LAB
ANION GAP SERPL CALC-SCNC: 8 MMOL/L (ref 0–18)
ANTIBODY SCREEN: NEGATIVE
BASOPHILS # BLD AUTO: 0.04 X10(3) UL (ref 0–0.2)
BASOPHILS NFR BLD AUTO: 0.5 %
BUN BLD-MCNC: 18 MG/DL (ref 9–23)
BUN/CREAT SERPL: 17.8 (ref 10–20)
CALCIUM BLD-MCNC: 8.8 MG/DL (ref 8.7–10.4)
CHLORIDE SERPL-SCNC: 108 MMOL/L (ref 98–112)
CO2 SERPL-SCNC: 26 MMOL/L (ref 21–32)
CREAT BLD-MCNC: 1.01 MG/DL (ref 0.55–1.02)
DEPRECATED HBV CORE AB SER IA-ACNC: 23 NG/ML (ref 50–306)
DEPRECATED RDW RBC AUTO: 58 FL (ref 35.1–46.3)
EGFRCR SERPLBLD CKD-EPI 2021: 54 ML/MIN/1.73M2 (ref 60–?)
EOSINOPHIL # BLD AUTO: 0.33 X10(3) UL (ref 0–0.7)
EOSINOPHIL NFR BLD AUTO: 4.1 %
ERYTHROCYTE [DISTWIDTH] IN BLOOD BY AUTOMATED COUNT: 18.6 % (ref 11–15)
GLUCOSE BLD-MCNC: 192 MG/DL (ref 70–99)
HCT VFR BLD AUTO: 21.1 % (ref 35–48)
HGB BLD-MCNC: 6.1 G/DL (ref 12–16)
IGA SERPL-MCNC: 293.8 MG/DL (ref 40–350)
IGM SERPL-MCNC: 152.8 MG/DL (ref 50–300)
IMM GRANULOCYTES # BLD AUTO: 0.02 X10(3) UL (ref 0–1)
IMM GRANULOCYTES NFR BLD: 0.3 %
IMMUNOGLOBULIN PNL SER-MCNC: 1533 MG/DL (ref 650–1600)
IRON SATN MFR SERPL: 1 % (ref 15–50)
IRON SERPL-MCNC: 2 UG/DL (ref 50–170)
LYMPHOCYTES # BLD AUTO: 1.87 X10(3) UL (ref 1–4)
LYMPHOCYTES NFR BLD AUTO: 23.4 %
MAGNESIUM SERPL-MCNC: 1.5 MG/DL (ref 1.6–2.6)
MCH RBC QN AUTO: 24.4 PG (ref 26–34)
MCHC RBC AUTO-ENTMCNC: 28.9 G/DL (ref 31–37)
MCV RBC AUTO: 84.4 FL (ref 80–100)
MONOCYTES # BLD AUTO: 0.84 X10(3) UL (ref 0.1–1)
MONOCYTES NFR BLD AUTO: 10.5 %
NEUTROPHILS # BLD AUTO: 4.89 X10 (3) UL (ref 1.5–7.7)
NEUTROPHILS # BLD AUTO: 4.89 X10(3) UL (ref 1.5–7.7)
NEUTROPHILS NFR BLD AUTO: 61.2 %
OSMOLALITY SERPL CALC.SUM OF ELEC: 301 MOSM/KG (ref 275–295)
PLATELET # BLD AUTO: 269 10(3)UL (ref 150–450)
POTASSIUM SERPL-SCNC: 3.6 MMOL/L (ref 3.5–5.1)
RBC # BLD AUTO: 2.5 X10(6)UL (ref 3.8–5.3)
RH BLOOD TYPE: POSITIVE
SODIUM SERPL-SCNC: 142 MMOL/L (ref 136–145)
TOTAL IRON BINDING CAPACITY: 310 UG/DL (ref 250–425)
TRANSFERRIN SERPL-MCNC: 244 MG/DL (ref 250–380)
WBC # BLD AUTO: 8 X10(3) UL (ref 4–11)

## 2025-04-21 RX ORDER — SODIUM CHLORIDE 9 MG/ML
10 INJECTION, SOLUTION INTRAMUSCULAR; INTRAVENOUS; SUBCUTANEOUS ONCE
OUTPATIENT
Start: 2025-04-22

## 2025-04-21 NOTE — PROGRESS NOTES
Pt here for Venofer 200mg and labs. Hgb of 6.1 on CBC today. Type and screen drawn; pt to return tomorrow for blood transfusion at 1:30p tomorrow.     Ordering Provider: Cade Woodard Exp: 1 dose remaining     Pt tolerated infusion without difficulty or complaint. Reviewed next apt date/time: 4/22 @ 1:30p      Education Record  Learner:  Patient  Disease / Diagnosis: BUZZ  Barriers / Limitations:  None  Method:  Reinforcement  General Topics:  Plan of care reviewed  Outcome:  Shows understanding

## 2025-04-22 ENCOUNTER — OFFICE VISIT (OUTPATIENT)
Age: 85
End: 2025-04-22
Attending: INTERNAL MEDICINE
Payer: MEDICARE

## 2025-04-22 VITALS
TEMPERATURE: 99 F | RESPIRATION RATE: 20 BRPM | SYSTOLIC BLOOD PRESSURE: 151 MMHG | DIASTOLIC BLOOD PRESSURE: 69 MMHG | OXYGEN SATURATION: 92 % | HEART RATE: 85 BPM

## 2025-04-22 DIAGNOSIS — D50.0 IRON DEFICIENCY ANEMIA DUE TO CHRONIC BLOOD LOSS: ICD-10-CM

## 2025-04-22 DIAGNOSIS — K55.21 AVM (ARTERIOVENOUS MALFORMATION) OF SMALL BOWEL, ACQUIRED WITH HEMORRHAGE: ICD-10-CM

## 2025-04-22 DIAGNOSIS — K90.9 MALABSORPTION OF IRON (HCC): ICD-10-CM

## 2025-04-22 DIAGNOSIS — D62 ACUTE BLOOD LOSS ANEMIA: Primary | ICD-10-CM

## 2025-04-22 LAB
ALBUMIN SERPL ELPH-MCNC: 3.18 G/DL (ref 3.75–5.21)
ALBUMIN/GLOB SERPL: 1.02 {RATIO} (ref 1–2)
ALPHA1 GLOB SERPL ELPH-MCNC: 0.3 G/DL (ref 0.19–0.46)
ALPHA2 GLOB SERPL ELPH-MCNC: 0.59 G/DL (ref 0.48–1.05)
B-GLOBULIN SERPL ELPH-MCNC: 0.77 G/DL (ref 0.68–1.23)
GAMMA GLOB SERPL ELPH-MCNC: 1.46 G/DL (ref 0.62–1.7)
KAPPA LC FREE SER-MCNC: 6.33 MG/DL (ref 0.33–1.94)
KAPPA LC FREE/LAMBDA FREE SER NEPH: 1.36 {RATIO} (ref 0.26–1.65)
LAMBDA LC FREE SERPL-MCNC: 4.64 MG/DL (ref 0.57–2.63)
PROT SERPL-MCNC: 6.3 G/DL (ref 5.7–8.2)

## 2025-04-22 RX ORDER — SODIUM CHLORIDE 9 MG/ML
10 INJECTION, SOLUTION INTRAMUSCULAR; INTRAVENOUS; SUBCUTANEOUS ONCE
OUTPATIENT
Start: 2025-04-22

## 2025-04-22 NOTE — PROGRESS NOTES
Pt here for blood transfusion. To receive 1 unit(s).    Dose 2 of 2 venofer 200mg iv push slow given.  E-consent competed by this nurse  One unit PRBC transfused'sppeared to tolerate treatment well.  No S/S of adverse rxn noted at this time. Discharged from infusion, Ambulating independently     Ordering MD: Lisha Garcia  Returns on 5/2 for cbc and exam.  Education Record  Learner:  Family Member = patient  Barriers / Limitations:  None  Method:  Discussion  General Topics:  Procedure and Plan of care reviewed  Outcome:  Shows understanding

## 2025-04-22 NOTE — PATIENT INSTRUCTIONS
Post Transfusion Instructions for Out-Patients    Most recipients of blood transfusions do not experience any adverse effects.  You may resume your normal activities 4 to 6 hours after your blood transfusion.  Occasionally, reactions of blood transfusions may be delayed (for several weeks).    Symptoms of a transfusion reaction can include:    Faintness  Weakness  Nausea  Vomiting  Shortness of breath  Chest pain  Back pain  Hives  Rash  Itch  Flushing  Fever  Chills  Jaundice (yellowish tint to eyes/skin)  Dark or red urine    Though these symptoms may not be related to the blood transfusions, they should be reported to your physician.  Contact both your physician and the laboratory Blood Bank (see information below) so that your symptoms can be thoroughly evaluated.    Your physician's name: Lisha Garcia  Your physician's phone number: [phone]    If your physician is unavailable, contact the Emergency Department.    [facility] Blood Bank: [phone]  [facility] Emergency Department: [phone]

## 2025-04-23 LAB
BLOOD TYPE BARCODE: 5100
UNIT VOLUME: 350 ML

## 2025-04-24 ENCOUNTER — APPOINTMENT (OUTPATIENT)
Age: 85
End: 2025-04-24
Attending: INTERNAL MEDICINE
Payer: MEDICARE

## 2025-04-25 ENCOUNTER — APPOINTMENT (OUTPATIENT)
Age: 85
End: 2025-04-25
Attending: INTERNAL MEDICINE
Payer: MEDICARE

## 2025-04-28 NOTE — TELEPHONE ENCOUNTER
Please call patient and go over medications.  Patient was recently inpatient and at rehab after.    2/5/2025 aspirin 81 EC Written by hospitalist at recent admit.  Is patient to continue?      3/27/25 Carvedilol last written by Dr. Sorto for 12.5mg twice daily.  At Neuro visit carvedilol is marked as patient reported 25mg with not instructions.    2/5/2025 Pantoprazole Written by hospitalist at recent admit.  Is patient to continue?      Ropinirole last written by Dr. Tere Damian ([facility]), has not been prescribed by Dr. Sorto or Dr. Alana Flores.    1/12/25 Rosuvastatin 20mg written by Dr. Sorto, dispense history show pharmacy also filling rosuvastatin 10mg written by Dr. Tere Damian

## 2025-04-29 ENCOUNTER — HOSPITAL ENCOUNTER (INPATIENT)
Facility: HOSPITAL | Age: 85
LOS: 6 days | Discharge: HOME | End: 2025-05-05
Attending: EMERGENCY MEDICINE | Admitting: HOSPITALIST
Payer: MEDICARE

## 2025-04-29 ENCOUNTER — APPOINTMENT (OUTPATIENT)
Dept: GENERAL RADIOLOGY | Facility: HOSPITAL | Age: 85
End: 2025-04-29
Attending: EMERGENCY MEDICINE
Payer: MEDICARE

## 2025-04-29 DIAGNOSIS — J96.01 ACUTE HYPOXIC RESPIRATORY FAILURE (HCC): Primary | ICD-10-CM

## 2025-04-29 DIAGNOSIS — J18.9 COMMUNITY ACQUIRED PNEUMONIA, UNSPECIFIED LATERALITY: ICD-10-CM

## 2025-04-29 PROBLEM — J44.9 COPD (CHRONIC OBSTRUCTIVE PULMONARY DISEASE) (HCC): Status: ACTIVE | Noted: 2025-04-29

## 2025-04-29 LAB
ALBUMIN SERPL-MCNC: 3.3 G/DL (ref 3.2–4.8)
ALBUMIN/GLOB SERPL: 1.1 {RATIO} (ref 1–2)
ALP LIVER SERPL-CCNC: 67 U/L (ref 55–142)
ALT SERPL-CCNC: 9 U/L (ref 10–49)
ANION GAP SERPL CALC-SCNC: 7 MMOL/L (ref 0–18)
AST SERPL-CCNC: 30 U/L (ref ?–34)
ATRIAL RATE: 80 BPM
BASOPHILS # BLD AUTO: 0.04 X10(3) UL (ref 0–0.2)
BASOPHILS NFR BLD AUTO: 0.6 %
BILIRUB SERPL-MCNC: 0.3 MG/DL (ref 0.2–1.1)
BUN BLD-MCNC: 14 MG/DL (ref 9–23)
BUN/CREAT SERPL: 11.5 (ref 10–20)
CALCIUM BLD-MCNC: 8.2 MG/DL (ref 8.7–10.4)
CHLORIDE SERPL-SCNC: 106 MMOL/L (ref 98–112)
CO2 SERPL-SCNC: 26 MMOL/L (ref 21–32)
CREAT BLD-MCNC: 1.22 MG/DL (ref 0.55–1.02)
DEPRECATED RDW RBC AUTO: 60.1 FL (ref 35.1–46.3)
EGFRCR SERPLBLD CKD-EPI 2021: 43 ML/MIN/1.73M2 (ref 60–?)
EOSINOPHIL # BLD AUTO: 0.22 X10(3) UL (ref 0–0.7)
EOSINOPHIL NFR BLD AUTO: 3.2 %
ERYTHROCYTE [DISTWIDTH] IN BLOOD BY AUTOMATED COUNT: 19.9 % (ref 11–15)
FLUAV + FLUBV RNA SPEC NAA+PROBE: NEGATIVE
FLUAV + FLUBV RNA SPEC NAA+PROBE: NEGATIVE
GLOBULIN PLAS-MCNC: 3 G/DL (ref 2–3.5)
GLUCOSE BLD-MCNC: 154 MG/DL (ref 70–99)
GLUCOSE BLDC GLUCOMTR-MCNC: 135 MG/DL (ref 70–99)
GLUCOSE BLDC GLUCOMTR-MCNC: 148 MG/DL (ref 70–99)
GLUCOSE BLDC GLUCOMTR-MCNC: 203 MG/DL (ref 70–99)
HCT VFR BLD AUTO: 27 % (ref 35–48)
HGB BLD-MCNC: 8.2 G/DL (ref 12–16)
IMM GRANULOCYTES # BLD AUTO: 0.04 X10(3) UL (ref 0–1)
IMM GRANULOCYTES NFR BLD: 0.6 %
LACTATE SERPL-SCNC: 0.9 MMOL/L (ref 0.5–2)
LYMPHOCYTES # BLD AUTO: 1.03 X10(3) UL (ref 1–4)
LYMPHOCYTES NFR BLD AUTO: 15.1 %
MAGNESIUM SERPL-MCNC: 1.6 MG/DL (ref 1.6–2.6)
MCH RBC QN AUTO: 26.2 PG (ref 26–34)
MCHC RBC AUTO-ENTMCNC: 30.4 G/DL (ref 31–37)
MCV RBC AUTO: 86.3 FL (ref 80–100)
MONOCYTES # BLD AUTO: 0.74 X10(3) UL (ref 0.1–1)
MONOCYTES NFR BLD AUTO: 10.8 %
NEUTROPHILS # BLD AUTO: 4.77 X10 (3) UL (ref 1.5–7.7)
NEUTROPHILS # BLD AUTO: 4.77 X10(3) UL (ref 1.5–7.7)
NEUTROPHILS NFR BLD AUTO: 69.7 %
NT-PROBNP SERPL-MCNC: 1263 PG/ML (ref ?–450)
OSMOLALITY SERPL CALC.SUM OF ELEC: 292 MOSM/KG (ref 275–295)
P AXIS: 34 DEGREES
P-R INTERVAL: 154 MS
PLATELET # BLD AUTO: 205 10(3)UL (ref 150–450)
POTASSIUM SERPL-SCNC: 3.1 MMOL/L (ref 3.5–5.1)
PROT SERPL-MCNC: 6.3 G/DL (ref 5.7–8.2)
Q-T INTERVAL: 408 MS
QRS DURATION: 68 MS
QTC CALCULATION (BEZET): 470 MS
R AXIS: 6 DEGREES
RBC # BLD AUTO: 3.13 X10(6)UL (ref 3.8–5.3)
RSV RNA SPEC NAA+PROBE: NEGATIVE
SARS-COV-2 RNA RESP QL NAA+PROBE: NOT DETECTED
SODIUM SERPL-SCNC: 139 MMOL/L (ref 136–145)
T AXIS: 51 DEGREES
VENTRICULAR RATE: 80 BPM
WBC # BLD AUTO: 6.8 X10(3) UL (ref 4–11)

## 2025-04-29 PROCEDURE — 99223 1ST HOSP IP/OBS HIGH 75: CPT | Performed by: HOSPITALIST

## 2025-04-29 PROCEDURE — 71045 X-RAY EXAM CHEST 1 VIEW: CPT | Performed by: EMERGENCY MEDICINE

## 2025-04-29 PROCEDURE — 99223 1ST HOSP IP/OBS HIGH 75: CPT | Performed by: INTERNAL MEDICINE

## 2025-04-29 RX ORDER — MAGNESIUM OXIDE 400 MG/1
400 TABLET ORAL ONCE
Status: COMPLETED | OUTPATIENT
Start: 2025-04-29 | End: 2025-04-29

## 2025-04-29 RX ORDER — POTASSIUM CHLORIDE 1500 MG/1
40 TABLET, EXTENDED RELEASE ORAL ONCE
Status: COMPLETED | OUTPATIENT
Start: 2025-04-29 | End: 2025-04-29

## 2025-04-29 RX ORDER — CARVEDILOL 25 MG/1
25 TABLET ORAL 2 TIMES DAILY WITH MEALS
Status: DISCONTINUED | OUTPATIENT
Start: 2025-04-29 | End: 2025-05-05

## 2025-04-29 RX ORDER — METHYLPREDNISOLONE SODIUM SUCCINATE 125 MG/2ML
125 INJECTION INTRAMUSCULAR; INTRAVENOUS ONCE
Status: COMPLETED | OUTPATIENT
Start: 2025-04-29 | End: 2025-04-29

## 2025-04-29 RX ORDER — IPRATROPIUM BROMIDE AND ALBUTEROL SULFATE 2.5; .5 MG/3ML; MG/3ML
3 SOLUTION RESPIRATORY (INHALATION) ONCE
Status: COMPLETED | OUTPATIENT
Start: 2025-04-29 | End: 2025-04-29

## 2025-04-29 RX ORDER — TEMAZEPAM 15 MG/1
15 CAPSULE ORAL NIGHTLY PRN
Status: DISCONTINUED | OUTPATIENT
Start: 2025-04-29 | End: 2025-05-05

## 2025-04-29 RX ORDER — NICOTINE POLACRILEX 4 MG
30 LOZENGE BUCCAL
Status: DISCONTINUED | OUTPATIENT
Start: 2025-04-29 | End: 2025-05-05

## 2025-04-29 RX ORDER — FLUTICASONE FUROATE AND VILANTEROL 100; 25 UG/1; UG/1
1 POWDER RESPIRATORY (INHALATION) DAILY
COMMUNITY
End: 2025-05-13

## 2025-04-29 RX ORDER — ASPIRIN 81 MG/1
81 TABLET ORAL DAILY
Status: DISCONTINUED | OUTPATIENT
Start: 2025-04-29 | End: 2025-05-05

## 2025-04-29 RX ORDER — FLUTICASONE PROPIONATE AND SALMETEROL 250; 50 UG/1; UG/1
1 POWDER RESPIRATORY (INHALATION) 2 TIMES DAILY
Status: DISCONTINUED | OUTPATIENT
Start: 2025-04-29 | End: 2025-05-05

## 2025-04-29 RX ORDER — DEXTROSE MONOHYDRATE 25 G/50ML
50 INJECTION, SOLUTION INTRAVENOUS
Status: DISCONTINUED | OUTPATIENT
Start: 2025-04-29 | End: 2025-05-05

## 2025-04-29 RX ORDER — GABAPENTIN 100 MG/1
100 CAPSULE ORAL NIGHTLY
COMMUNITY

## 2025-04-29 RX ORDER — ONDANSETRON 2 MG/ML
4 INJECTION INTRAMUSCULAR; INTRAVENOUS EVERY 6 HOURS PRN
Status: DISCONTINUED | OUTPATIENT
Start: 2025-04-29 | End: 2025-05-05

## 2025-04-29 RX ORDER — METOCLOPRAMIDE HYDROCHLORIDE 5 MG/ML
5 INJECTION INTRAMUSCULAR; INTRAVENOUS EVERY 8 HOURS PRN
Status: DISCONTINUED | OUTPATIENT
Start: 2025-04-29 | End: 2025-05-05

## 2025-04-29 RX ORDER — GABAPENTIN 100 MG/1
100 CAPSULE ORAL NIGHTLY
Status: DISCONTINUED | OUTPATIENT
Start: 2025-04-29 | End: 2025-05-05

## 2025-04-29 RX ORDER — ACETAMINOPHEN 500 MG
500 TABLET ORAL EVERY 4 HOURS PRN
Status: DISCONTINUED | OUTPATIENT
Start: 2025-04-29 | End: 2025-05-02

## 2025-04-29 RX ORDER — PANTOPRAZOLE SODIUM 40 MG/1
40 TABLET, DELAYED RELEASE ORAL
Status: DISCONTINUED | OUTPATIENT
Start: 2025-04-29 | End: 2025-05-05

## 2025-04-29 RX ORDER — AZITHROMYCIN 250 MG/1
500 TABLET, FILM COATED ORAL DAILY
Status: COMPLETED | OUTPATIENT
Start: 2025-04-29 | End: 2025-05-01

## 2025-04-29 RX ORDER — INSULIN DEGLUDEC 100 U/ML
14 INJECTION, SOLUTION SUBCUTANEOUS DAILY
Status: DISCONTINUED | OUTPATIENT
Start: 2025-04-30 | End: 2025-05-01

## 2025-04-29 RX ORDER — LOSARTAN POTASSIUM 25 MG/1
25 TABLET ORAL DAILY
Status: DISCONTINUED | OUTPATIENT
Start: 2025-04-30 | End: 2025-05-03

## 2025-04-29 RX ORDER — HEPARIN SODIUM 5000 [USP'U]/ML
5000 INJECTION, SOLUTION INTRAVENOUS; SUBCUTANEOUS EVERY 12 HOURS SCHEDULED
Status: DISCONTINUED | OUTPATIENT
Start: 2025-04-29 | End: 2025-05-02

## 2025-04-29 RX ORDER — MAGNESIUM OXIDE 400 MG/1
400 TABLET ORAL 2 TIMES DAILY
Status: DISCONTINUED | OUTPATIENT
Start: 2025-04-29 | End: 2025-05-05

## 2025-04-29 RX ORDER — MONTELUKAST SODIUM 10 MG/1
10 TABLET ORAL NIGHTLY
Status: DISCONTINUED | OUTPATIENT
Start: 2025-04-29 | End: 2025-05-05

## 2025-04-29 RX ORDER — ROPINIROLE 0.25 MG/1
0.25 TABLET, FILM COATED ORAL NIGHTLY
Status: DISCONTINUED | OUTPATIENT
Start: 2025-04-29 | End: 2025-05-05

## 2025-04-29 RX ORDER — METHYLPREDNISOLONE SODIUM SUCCINATE 40 MG/ML
40 INJECTION INTRAMUSCULAR; INTRAVENOUS EVERY 8 HOURS
Status: DISCONTINUED | OUTPATIENT
Start: 2025-04-29 | End: 2025-04-30

## 2025-04-29 RX ORDER — NICOTINE POLACRILEX 4 MG
15 LOZENGE BUCCAL
Status: DISCONTINUED | OUTPATIENT
Start: 2025-04-29 | End: 2025-05-05

## 2025-04-29 RX ORDER — IPRATROPIUM BROMIDE AND ALBUTEROL SULFATE 2.5; .5 MG/3ML; MG/3ML
3 SOLUTION RESPIRATORY (INHALATION) EVERY 4 HOURS PRN
Status: DISCONTINUED | OUTPATIENT
Start: 2025-04-29 | End: 2025-05-05

## 2025-04-29 RX ORDER — METHYLPREDNISOLONE SODIUM SUCCINATE 40 MG/ML
40 INJECTION INTRAMUSCULAR; INTRAVENOUS EVERY 8 HOURS
Status: DISCONTINUED | OUTPATIENT
Start: 2025-04-29 | End: 2025-04-29

## 2025-04-29 RX ORDER — ROSUVASTATIN CALCIUM 10 MG/1
10 TABLET, COATED ORAL NIGHTLY
Status: DISCONTINUED | OUTPATIENT
Start: 2025-04-29 | End: 2025-05-05

## 2025-04-29 RX ORDER — SENNOSIDES 8.6 MG
8.6 TABLET ORAL 2 TIMES DAILY PRN
Status: DISCONTINUED | OUTPATIENT
Start: 2025-04-29 | End: 2025-05-05

## 2025-04-29 RX ORDER — BENZONATATE 200 MG/1
200 CAPSULE ORAL 3 TIMES DAILY PRN
Status: DISCONTINUED | OUTPATIENT
Start: 2025-04-29 | End: 2025-05-05

## 2025-04-29 NOTE — H&P
[facility]    PATIENT'S NAME: CARLOS EDUARDO PARR   ATTENDING PHYSICIAN: Jose Baker MD   PATIENT ACCOUNT#:   [id number]    LOCATION:  [address]  MEDICAL RECORD #:   [id number]       YOB: 1940  ADMISSION DATE:       04/29/2025    HISTORY AND PHYSICAL EXAMINATION    CHIEF COMPLAINT:  COPD exacerbation and possible community-acquired pneumonia.    HISTORY OF PRESENT ILLNESS:  The patient is an 85-year-old  female with known underlying chronic obstructive pulmonary disease, presented to the emergency department for evaluation of progressive shortness of breath and low oxygen saturation at home per her family with increased cough.  Pulse ox 84% on upon arrival.  CBC showed hemoglobin of 8.2, slightly below her baseline.   BUN and creatinine of 14 and 1.22.  GFR is 43, which is at baseline.  Magnesium 1.6.  GeneXpert viral panel was negative.  ProBNP 1200.  Blood cultures were obtained.  Lactic acid 0.9.  Chest x-ray showed mixed patchy bilateral lung opacities, mildly increased since prior study which may reflect acute infection or edema.  The patient was started on IV antibiotics, Solu-Medrol, nebulizer treatments, and she will be admitted to the hospital for further management.    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    PAST SURGICAL HISTORY:  Left subclavian artery stent; cataract procedure; IVC filter insertion and removal; total abdominal hysterectomy; right breast lumpectomy for breast cancer plus radiation therapy;  bilateral total knee arthroplasties; right foot bunionectomy; Nissen fundoplication; shoulder rotator cuff repair.    MEDICATIONS:  Please see medication reconciliation list.     ALLERGIES:  Cephalexin, penicillin, metformin, olmesartan.    FAMILY HISTORY:  Mother had heart disease and valvular disorder.    SOCIAL HISTORY:  Ex-tobacco user.  No current tobacco, alcohol or drug use.    REVIEW OF SYSTEMS:  The patient said she does not recall exact sick contacts and she does not choke on her food but she has been having cough more recently productive of greenish phlegm.  She felt subjective fevers at home with increased wheezing and shortness of breath for the last 4 to 5 days.  Other 12-point review of systems negative.      PHYSICAL EXAMINATION:    GENERAL:  Alert, oriented to time, place, and person, mild distress.   VITAL SIGNS:  Temperature 97.7.  Pulse 80.  Respiratory rate 23.  Blood pressure 98/84.  Pulse ox 82% on room air, 100% on nasal cannula oxygen.  HEENT:  Atraumatic.  Oropharynx clear.  Dry mucous membranes.  Ears, nose normal.  Eyes:  Anicteric sclerae.   NECK:  Supple.  No lymphadenopathy.  Trachea midline.   LUNGS:  Bilateral expiratory wheezing with increased respiratory effort.  HEART:  Regular rate and rhythm.  S1 and S2 auscultated.  No murmur.  ABDOMEN:  Soft, nondistended.  No tenderness.  Positive bowel sounds.  EXTREMITIES:  No peripheral edema, clubbing, or cyanosis.  NEUROLOGIC:  Motor and sensory intact.     ASSESSMENT:    1.   Acute COPD exacerbation with acute on chronic hypoxic respiratory failure.  2.   Pulmonary infiltrates on chest x-ray.  This could be related to progressive pulmonary fibrosis versus community-acquired pneumonia.  The patient does have recent symptoms of subjective chills and increased cough with productive phlegm.  3.   Anemia of chronic kidney disease.  4.   Chronic kidney disease stage 3.    PLAN:  The patient will be admitted to general medical floor.  IV  Solu-Medrol.  Nebulizer treatments.  IV antibiotics, Rocephin and azithromycin.  Oxygen support.  Obtain Pulmonary consult.  Sputum cultures.  Follow up on blood cultures.  Further recommendations to follow.     Dictated By Raegan Manzo MD  d: 04/29/2025 13:44:17  t: 04/29/2025 13:50:57  [facility] [id number]  FB/

## 2025-04-29 NOTE — ED PROVIDER NOTES
Patient Seen in: [facility] Emergency Department      History     Chief Complaint   Patient presents with    Shortness Of Breath     Stated Complaint: Low Oxygen levels, copd    Subjective:   HPI    85-year-old female presents for evaluation for shortness of breath.  Patient reports that she started feeling short of breath last night.  They were watching her oxygen saturation at home and her oxygen level was in the low 80s.  They did use her rescue inhaler last night.  She has been coughing.  She does report some swelling to her right foot.  She does have an IVC filter.  She denies any fevers, chills, chest pain, nausea, vomiting.  Daughter reports that she has a history of AVMs and did need a blood transfusion on Monday last week.  History of Present Illness               Objective:     No pertinent past medical history.            No pertinent past surgical history.              No pertinent social history.                              Physical Exam     ED Triage Vitals [04/29/25 1008]   /66   Pulse 79   Resp 20   Temp 97.7 °F (36.5 °C)   Temp src Oral   SpO2 (!) 84 %   O2 Device None (Room air)       Current Vitals:   Vital Signs  BP: (!) 127/94  Pulse: 71  Resp: 18  Temp: 97.7 °F (36.5 °C)  Temp src: Oral  MAP (mmHg): (!) 105    Oxygen Therapy  SpO2: 99 %  O2 Device: Nasal cannula  O2 Flow Rate (L/min): 2 L/min        Physical Exam  Vitals and nursing note reviewed.   Constitutional:       Appearance: Normal appearance.   HENT:      Head: Normocephalic and atraumatic.   Cardiovascular:      Rate and Rhythm: Normal rate and regular rhythm.      Pulses: Normal pulses.   Pulmonary:      Effort: Pulmonary effort is normal.      Breath sounds: Wheezing present.   Musculoskeletal:         General: Normal range of motion.      Cervical back: Normal range of motion.   Skin:     General: Skin is warm and dry.   Neurological:      General: No focal deficit present.      Mental Status: She is alert.            Physical Exam                ED Course     Labs Reviewed   CBC WITH DIFFERENTIAL WITH PLATELET - Abnormal; Notable for the following components:       Result Value    RBC 3.13 (*)     HGB 8.2 (*)     HCT 27.0 (*)     MCHC 30.4 (*)     RDW-SD 60.1 (*)     RDW 19.9 (*)     All other components within normal limits   COMP METABOLIC PANEL (14) - Abnormal; Notable for the following components:    Glucose 154 (*)     Potassium 3.1 (*)     Creatinine 1.22 (*)     Calcium, Total 8.2 (*)     eGFR-Cr 43 (*)     ALT 9 (*)     All other components within normal limits   PRO BETA NATRIURETIC PEPTIDE - Abnormal; Notable for the following components:    Pro-Beta Natriuretic Peptide 1,263 (*)     All other components within normal limits   MAGNESIUM - Normal   LACTIC ACID, PLASMA - Normal   SARS-COV-2/FLU A AND B/RSV BY PCR (GENEXPERT) - Normal    Narrative:     This test is intended for the qualitative detection and differentiation of SARS-CoV-2, influenza A, influenza B, and respiratory syncytial virus (RSV) viral RNA in nasopharyngeal or nares swabs from individuals suspected of respiratory viral infection consistent with COVID-19 by their healthcare provider. Signs and symptoms of respiratory viral infection due to SARS-CoV-2, influenza, and RSV can be similar.    Test performed using the Xpert Xpress SARS-CoV-2/FLU/RSV (real time RT-PCR)  assay on the [employer] instrument, [employer], [place] [address].   This test is being used under the Food and Drug Administration's Emergency Use Authorization.    The authorized Fact Sheet for Healthcare Providers for this assay is available upon request from the laboratory.   BLOOD CULTURE   BLOOD CULTURE     EKG    Rate, intervals and axes as noted on EKG Report.  Rate: 80  Rhythm: Sinus Rhythm  Reading: no stemi, interpreted by myself           ED Course as of 04/29/25 1316  ------------------------------------------------------------  Time: 04/29 1258  Value: XR CHEST AP PORTABLE   (CPT=71045)  Comment: Per my independent interpretation, patient's CXR demonstrates no pneumothorax.       Results                                 MDM          Admission disposition: 4/29/2025  1:12 PM           Medical Decision Making  Differential diagnosis includes but is not limited to pneumonia, COPD exacerbation, viral syndrome, PE, CHF, etc    CBC and chemistry were unremarkable.  Patient was hypoxic on arrival to the ED and placed on oxygen.  She was given breathing treatments.  Chest x-ray concerning for pneumonia.  She does not appear to be volume overloaded.  She will be started on Rocephin and doxycycline.  Pulmonology is on consult.    Rhythm Strip: Rate 78 sinus rhythm as interpreted by myself. The cardiac monitor was ordered secondary to the patient's hypoxia.     Complicating factors: The patient  has a past medical history of Anxiety state, Cancer (HCC), Cerebellar stroke (HCC) (01/17/2025), Chronic obstructive pulmonary disease, unspecified (HCC) (05/04/2023), Chronic obstructive pulmonary disease, unspecified (HCC) (05/04/2023), COPD (chronic obstructive pulmonary disease) (HCC), Cystic thyroid nodule (12/11/2024), Diabetes (HCC), Diabetes mellitus (HCC), Essential hypertension, Exposure to medical diagnostic radiation, High blood pressure, High cholesterol, History of blood transfusion (07/2022), Osteoarthritis, PONV (postoperative nausea and vomiting), Pulmonary embolism (HCC), Pulmonary emphysema (HCC), Rheumatoid arthritis (HCC), and Stroke (HCC) (01/14/2025). and  has a past surgical history that includes correct bunion,simple; Trigger finger release; hysterectomy; Rotator cuff repair; ir aneurysm repairm (2010); total knee replacement (Right, 2010); transoral incisionless fundoplication - internal (01/25/2012 Fredy fundoplication at [facility] Dr. Fournier.); total knee replacement (Left, 07/02/2015); colonoscopy; lumpectomy right; colonoscopy (N/A, 07/21/2022); Cataract extraction  w/  intraocular lens implant (Bilateral, 2017); Yag Capsulotomy - OU - Both Eyes (Bilateral, 09/2021); radiation right; colonoscopy (N/A, 06/15/2023); and egd (12/21/2023). that contribute to the medical complexity of this ED evaluation.     Medical Record Review: I personally reviewed available prior medical records for any recent pertinent discharge summaries, testing, and procedures, and reviewed those reports.        Problems Addressed:  Acute hypoxic respiratory failure (HCC): acute illness or injury with systemic symptoms  Community acquired pneumonia, unspecified laterality: acute illness or injury with systemic symptoms    Amount and/or Complexity of Data Reviewed  External Data Reviewed: ECG.     Details: EKG from 1/17/25 reviewed, rate 84, NSR  Labs: ordered. Decision-making details documented in ED Course.  Radiology: ordered and independent interpretation performed. Decision-making details documented in ED Course.  ECG/medicine tests: ordered and independent interpretation performed. Decision-making details documented in ED Course.  Discussion of management or test interpretation with external provider(s): Dr. Manzo accepts admission and would like abx and pulm consult. Dr. Williamson is on for pulm.    Risk  Decision regarding hospitalization.    Critical Care  Total time providing critical care: minutes (38 minutes including time spent examining and re-evaluating the patient, ordering and reviewing laboratory tests, documenting, reviewing previous records, obtaining information from the family, and speaking with consultants, admitting doctors, nurses and medics and excludes any time spent on procedures.  )        Disposition and Plan     Clinical Impression:  1. Acute hypoxic respiratory failure (HCC)    2. Community acquired pneumonia, unspecified laterality         Disposition:  Admit  4/29/2025  1:12 pm    Follow-up:  No follow-up provider specified.  We recommend that you schedule follow up care  with a primary care provider within the next three months to obtain basic health screening including reassessment of your blood pressure.      Medications Prescribed:  Current Discharge Medication List          Supplementary Documentation:         Hospital Problems       Present on Admission  Date Reviewed: 4/10/2025          ICD-10-CM Noted POA    * (Principal) Acute hypoxic respiratory failure (HCC) J96.01 4/29/2025 Unknown

## 2025-04-29 NOTE — PLAN OF CARE
Problem: Patient Centered Care  Goal: Patient preferences are identified and integrated in the patient's plan of care  Description: Interventions:- What would you like us to know as we care for you? - Provide timely, complete, and accurate information to patient/family- Incorporate patient and family knowledge, values, beliefs, and cultural backgrounds into the planning and delivery of care- Encourage patient/family to participate in care and decision-making at the level they choose- Honor patient and family perspectives and choices  Outcome: Progressing     Problem: PAIN - ADULT  Goal: Verbalizes/displays adequate comfort level or patient's stated pain goal  Description: INTERVENTIONS:- Encourage pt to monitor pain and request assistance- Assess pain using appropriate pain scale- Administer analgesics based on type and severity of pain and evaluate response- Implement non-pharmacological measures as appropriate and evaluate response- Consider cultural and social influences on pain and pain management- Manage/alleviate anxiety- Utilize distraction and/or relaxation techniques- Monitor for opioid side effects- Notify MD/LIP if interventions unsuccessful or patient reports new pain- Anticipate increased pain with activity and pre-medicate as appropriate  Outcome: Progressing     Problem: RESPIRATORY - ADULT  Goal: Achieves optimal ventilation and oxygenation  Description: INTERVENTIONS:- Assess for changes in respiratory status- Assess for changes in mentation and behavior- Position to facilitate oxygenation and minimize respiratory effort- Oxygen supplementation based on oxygen saturation or ABGs- Provide Smoking Cessation handout, if applicable- Encourage broncho-pulmonary hygiene including cough, deep breathe, Incentive Spirometry- Assess the need for suctioning and perform as needed- Assess and instruct to report SOB or any respiratory difficulty- Respiratory Therapy support as indicated- Manage/alleviate  anxiety- Monitor for signs/symptoms of CO2 retention  Outcome: Progressing     Problem: DISCHARGE PLANNING  Goal: Discharge to home or other facility with appropriate resources  Description: INTERVENTIONS:- Identify barriers to discharge w/pt and caregiver- Include patient/family/discharge partner in discharge planning- Arrange for needed discharge resources and transportation as appropriate- Identify discharge learning needs (meds, wound care, etc)- Arrange for interpreters to assist at discharge as needed- Consider post-discharge preferences of patient/family/discharge partner- Complete POLST form as appropriate- Assess patient's ability to be responsible for managing their own health- Refer to Case Management Department for coordinating discharge planning if the patient needs post-hospital services based on physician/LIP order or complex needs related to functional status, cognitive ability or social support system  Outcome: Progressing

## 2025-04-29 NOTE — ED QUICK NOTES
Orders for admission, patient is aware of plan and ready to go upstairs. Any questions, please call ED RN tim brunson  at extension [phone].     Patient Covid vaccination status: Fully vaccinated     COVID Test Ordered in ED: SARS-CoV-2/Flu A and B/RSV by PCR (GeneXpert)    COVID Suspicion at Admission: N/A    Running Infusions: Medication Infusions[1] None    Mental Status/LOC at time of transport: 4/4    Other pertinent information: on 2L NC   CIWA score: N/A   NIH score:  N/A             [1]

## 2025-04-29 NOTE — CONSULTS
[facility]  part of [facility]    Report of Consultation    Chester Bautista Patient Status:  Inpatient    1940 MRN [id number]   Location [facility] 5SW/SE Attending Raegan Manzo MD   Hosp Day # 0 PCP Heike Sorto MD     Date of Admission:  2025    Reason for Consultation:   Dyspnea    History of Present Illness:   Patient is a 85-year-old male with past medical history significant for COPD, ILD, previous CVA, diabetes mellitus who presents with chief complaint of dyspnea with some increased cough over the course last several days.  Denies use of home oxygen therapy.  Cough productive in nature.  Started on antibiotic therapy for concern for underlying pneumonia.  Some occasional wheezing.  Denies fevers or chills    Past Medical History  Past Medical History[1]    Past Surgical History  Past Surgical History[2]    Family History  Family History[3]    Social History  Social Hx on file[4]        Current Medications:  Current Hospital Medications[5]  Prescriptions Prior to Admission[6]    Allergies  Allergies[7]    Review of Systems:   Constitutional: Alert, awake  HEENT: denies headache, sore throat, vision loss  Cardio: denies chest pain, chest pressure, palpitations  Respiratory: dyspnea, cough, wheezing, denies hemoptysis   GI: denies nausea, vomiting, abdominal pain  : denies dysuria, hematuria  Musculoskeletal: denies arthralgia, myalgia  Integumentary: denies rash, itching  Neurological: denies syncope, weakness, dizziness,   Psychiatric: denies depression, anxiety  Hematologic: denies bruising        Physical Exam:   Blood pressure 125/54, pulse 74, temperature 98.2 °F (36.8 °C), temperature source Oral, resp. rate 18, weight 139 lb 12.8 oz (63.4 kg), SpO2 100%.    Constitutional: no acute distress  Eyes: PERRL  ENT: nares patent  Neck: neck supple, no JVD  Cardio: RRR, S1 S2  Respiratory: Faint expiratory wheeze  GI: abdomen soft, non tender, active  bowel souds, no organomegaly  Extremities: no clubbing, cyanosis, edema  Neurologic: no gross motor deficits  Skin: warm, dry    Results:   Laboratory Data  Lab Results   Component Value Date    WBC 6.8 04/29/2025    HGB 8.2 (L) 04/29/2025    HCT 27.0 (L) 04/29/2025    .0 04/29/2025    CREATSERUM 1.22 (H) 04/29/2025    BUN 14 04/29/2025     04/29/2025    K 3.1 (L) 04/29/2025     04/29/2025    CO2 26.0 04/29/2025     (H) 04/29/2025    CA 8.2 (L) 04/29/2025    ALB 3.3 04/29/2025    ALKPHO 67 04/29/2025    TP 6.3 04/29/2025    AST 30 04/29/2025    ALT 9 (L) 04/29/2025    PTT 51.3 (H) 01/30/2025    INR 1.04 01/25/2025    PTP 14.3 01/25/2025    T4F 1.2 01/17/2025    TSH 0.418 (L) 01/17/2025    DDIMER 2.77 (H) 01/25/2025    ESRML 56 (H) 03/13/2025    CRP 1.60 (H) 03/13/2025    MG 1.6 04/29/2025    PHOS 4.0 01/22/2025    B12 1,058 (H) 02/04/2025         Imaging  XR CHEST AP PORTABLE  (CPT=71045)  Result Date: 4/29/2025  CONCLUSION:   Mixed patchy bilateral lung opacity mildly increased since the prior study which may relate to acute infection or edema superimposed on chronic changes.     Dictated by (CST): Gene Austin MD on 4/29/2025 at 11:56 AM     Finalized by (CST): Gene Austin MD on 4/29/2025 at 11:59 AM            Assessment   1.  COPD with acute exacerbation  2.  CPFE  3.  Possible pneumonia  4.  Acute kidney injury on chronic kidney disease  5.  Anemia    Plan   -Patient presents with evidence of worsening dyspnea over the last several days.  Concern for COPD exacerbation  - Chest x-ray with some patchy bilateral opacities seen cannot rule out underlying pneumonia.  - Continue empiric antibiotic therapy at this time  - Blood cultures pending  - Gradually wean steroid therapy  - ICS/LABA  - Scheduled nebulizer treatments  - Reviewed vitals, labs and imaging    Damon Williamson DO  Pulmonary Critical Care Medicine  [facility]  4/29/2025  4:09 PM        [1]   Past Medical  History:   Anxiety state    Cancer (HCC)    breast cancer 2018    Cerebellar stroke (HCC)    Chronic obstructive pulmonary disease, unspecified (HCC)    Chronic obstructive pulmonary disease, unspecified (HCC)    COPD (chronic obstructive pulmonary disease) (HCC)    Cystic thyroid nodule    Diabetes (HCC)    Diabetes mellitus (HCC)    Essential hypertension    Exposure to medical diagnostic radiation    High blood pressure    High cholesterol    History of blood transfusion    low hemoglobin    Osteoarthritis    PONV (postoperative nausea and vomiting)    Pulmonary embolism (HCC)    Pulmonary emphysema (HCC)    Rheumatoid arthritis (HCC)    Stroke (HCC)   [2]   Past Surgical History:  Procedure Laterality Date    Cataract extraction w/  intraocular lens implant Bilateral 2017    Dr in [facility]     Colonoscopy      Colonoscopy N/A 07/21/2022    Procedure: COLONOSCOPY;  Surgeon: Mikey Garcia MD;  Location: [facility] ENDOSCOPY    Colonoscopy N/A 06/15/2023    Procedure: COLONOSCOPY;  Surgeon: Mikey Garcia MD;  Location: [facility] ENDOSCOPY    Correct bunion,simple      right foot  1993    Egd  12/21/2023    Dr. Garcia; Duodenal AVM's x4 cauterized    Hysterectomy      1972, no abnormal Paps, due to heavy bleeding with fibroids.  Not sure if it had bilateral salpingo-oophorectomy.    Ir aneurysm repairm  2010    Computer assisted volumetric craniofomy with clipping of anterior cerebral artery.    Lumpectomy right      Radiation right      Rotator cuff repair      1993    Total knee replacement Right 2010    Total knee replacement Left 07/02/2015    Transoral incisionless fundoplication - internal  01/25/2012 Fredy fundoplication at [facility] Dr. Fournier.    Fredy fundoplication at [facility] Dr. Fournier.    Trigger finger release      left hand  2005    Yag capsulotomy - ou - both eyes Bilateral 09/2021    done in [place]   [3]   Family History  Problem Relation Age of Onset    Heart Surgery  Mother         Leaky valve at 39 y/o.     Prostate Cancer Brother     Cancer Brother     Diabetes Maternal Grandmother     Diabetes Paternal Aunt     Breast Cancer Self 78    Breast Cancer Niece         before 50    Macular degeneration Neg     Glaucoma Neg    [4]   Social History  Socioeconomic History    Marital status:    Tobacco Use    Smoking status: Former     Current packs/day: 0.00     Types: Cigarettes     Quit date:      Years since quittin.3     Passive exposure: Past    Smokeless tobacco: Never    Tobacco comments:     [id number]   Vaping Use    Vaping status: Never Used   Substance and Sexual Activity    Alcohol use: Never    Drug use: Never   [5]   Current Facility-Administered Medications   Medication Dose Route Frequency    azithromycin (Zithromax) tab 500 mg  500 mg Oral Daily    [START ON 2025] cefTRIAXone (Rocephin) 2 g in sodium chloride 0.9% 100 mL IVPB-ADDV  2 g Intravenous Q24H    ipratropium-albuterol (Duoneb) 0.5-2.5 (3) MG/3ML inhalation solution 3 mL  3 mL Nebulization Q4H PRN    glucose (Dex4) 15 GM/59ML oral liquid 15 g  15 g Oral Q15 Min PRN    Or    glucose (Glutose) 40% oral gel 15 g  15 g Oral Q15 Min PRN    Or    glucose-vitamin C (Dex-4) chewable tab 4 tablet  4 tablet Oral Q15 Min PRN    Or    dextrose 50% injection 50 mL  50 mL Intravenous Q15 Min PRN    Or    glucose (Dex4) 15 GM/59ML oral liquid 30 g  30 g Oral Q15 Min PRN    Or    glucose (Glutose) 40% oral gel 30 g  30 g Oral Q15 Min PRN    Or    glucose-vitamin C (Dex-4) chewable tab 8 tablet  8 tablet Oral Q15 Min PRN    heparin (Porcine) 5000 UNIT/ML injection 5,000 Units  5,000 Units Subcutaneous 2 times per day    acetaminophen (Tylenol Extra Strength) tab 500 mg  500 mg Oral Q4H PRN    ondansetron (Zofran) 4 MG/2ML injection 4 mg  4 mg Intravenous Q6H PRN    metoclopramide (Reglan) 5 mg/mL injection 5 mg  5 mg Intravenous Q8H PRN    guaiFENesin (Robitussin) 100 MG/5 ML oral liquid 200 mg  200 mg Oral  Q4H PRN    benzonatate (Tessalon) cap 200 mg  200 mg Oral TID PRN    temazepam (Restoril) cap 15 mg  15 mg Oral Nightly PRN    aspirin DR tab 81 mg  81 mg Oral Daily    carvedilol (Coreg) tab 25 mg  25 mg Oral BID with meals    gabapentin (Neurontin) cap 100 mg  100 mg Oral Nightly    [START ON 4/30/2025] insulin degludec (Tresiba) 100 units/mL flextouch 14 Units  14 Units Subcutaneous Daily    [START ON 4/30/2025] losartan (Cozaar) tab 25 mg  25 mg Oral Daily    magnesium oxide (Mag-Ox) tab 400 mg  400 mg Oral BID    montelukast (Singulair) tab 10 mg  10 mg Oral Nightly    pantoprazole (Protonix) DR tab 40 mg  40 mg Oral BID AC    rOPINIRole (Requip) tab 0.25 mg  0.25 mg Oral Nightly    rosuvastatin (Crestor) tab 10 mg  10 mg Oral Nightly    sennosides (Senokot) tab 8.6 mg  8.6 mg Oral BID PRN    insulin aspart (NovoLOG) 100 Units/mL FlexPen 1-7 Units  1-7 Units Subcutaneous TID CC and HS    potassium chloride (Klor-Con M20) tab 40 mEq  40 mEq Oral Once    magnesium oxide (Mag-Ox) tab 400 mg  400 mg Oral Once    methylPREDNISolone sodium succinate (Solu-MEDROL) injection 40 mg  40 mg Intravenous Q8H   [6]   Medications Prior to Admission   Medication Sig    fluticasone furoate-vilanterol (BREO ELLIPTA) 100-25 MCG/ACT Inhalation Aerosol Powder, Breath Activated Inhale 1 puff into the lungs daily.    gabapentin 100 MG Oral Cap Take 1 capsule (100 mg total) by mouth nightly.    glipiZIDE 10 MG Oral Tab Take 1 tablet (10 mg total) by mouth daily with breakfast.    losartan 25 MG Oral Tab Take 1 tablet (25 mg total) by mouth in the morning.    insulin lispro 100 UNIT/ML Injection Solution Inject 3 Units into the skin 3 (three) times daily before meals. Sliding scale depending on blood sugar    carvedilol 25 MG Oral Tab Take 1 tablet (25 mg total) by mouth 2 (two) times daily with meals.    rOPINIRole 0.25 MG Oral Tab Take 1 tablet (0.25 mg total) by mouth at bedtime.    LANTUS SOLOSTAR 100 UNIT/ML Subcutaneous Solution  Pen-injector Inject 14 Units into the skin every morning.    sodium chloride 0.9% SOLN 100 mL with ferric gluconate 12.5 mg/mL SOLN 250 mg Inject 250 mg into the vein once for 1 dose.    pantoprazole 40 MG Oral Tab EC Take 1 tablet (40 mg total) by mouth in the morning and 1 tablet (40 mg total) in the evening. Take before meals.    aspirin 81 MG Oral Tab EC Take 1 tablet (81 mg total) by mouth in the morning.    acetaminophen 325 MG Oral Tab Take 2 tablets (650 mg total) by mouth every 6 (six) hours as needed.    rosuvastatin 20 MG Oral Tab Take 1 tablet (20 mg total) by mouth nightly. (Patient taking differently: Take 0.5 tablets (10 mg total) by mouth nightly.)    magnesium oxide 400 MG Oral Tab Take 1 tablet (400 mg total) by mouth 2 (two) times daily. Per Dr. JENSEN Mata    predniSONE 5 MG Oral Tab Take 1 tablet (5 mg total) by mouth daily.    montelukast 10 MG Oral Tab Take 1 tablet (10 mg total) by mouth nightly.    Gabapentin Enacarbil  MG Oral Tab CR Take 300 mg by mouth every evening. Take every evening at 6 pm (Patient not taking: Reported on 4/29/2025)    Atogepant 30 MG Oral Tab Take 30 mg by mouth daily. (Patient not taking: Reported on 4/29/2025)    Ferrous Sulfate 325 (65 Fe) MG Oral Tab Take 1 tablet (325 mg total) by mouth daily with breakfast. (Patient not taking: Reported on 4/29/2025)    fluticasone-salmeterol 250-50 MCG/ACT Inhalation Aerosol Powder, Breath Activated Inhale 1 puff into the lungs every 12 (twelve) hours. (Patient not taking: Reported on 4/29/2025)    ipratropium 0.02 % Inhalation Solution Take 2.5 mL (500 mcg total) by nebulization 2 (two) times daily.    sennosides 8.6 MG Oral Tab Take 1 tablet (8.6 mg total) by mouth 2 (two) times daily.    meclizine 12.5 MG Oral Tab Take 1 tablet (12.5 mg total) by mouth 3 (three) times daily as needed for Dizziness. (Patient not taking: Reported on 4/29/2025)    lidocaine-menthol 4-1 % External Patch Place 1 patch onto the skin daily.     Cholecalciferol (VITAMIN D3) 1.25 MG (61107 UT) Oral Cap TAKE 1 CAPSULE BY MOUTH EVERY 2 WEEKS (Patient not taking: Reported on 4/29/2025)    loratadine 10 MG Oral Tab TAKE 1 TABLET BY MOUTH EVERY DAY (Patient not taking: Reported on 4/29/2025)    hydroxychloroquine 200 MG Oral Tab Take 1.5 tablets (300 mg total) by mouth daily. (Patient not taking: Reported on 4/29/2025)    albuterol (2.5 MG/3ML) 0.083% Inhalation Nebu Soln Take 3 mL (2.5 mg total) by nebulization every 6 (six) hours as needed for Wheezing. (Patient not taking: Reported on 4/29/2025)    albuterol 108 (90 Base) MCG/ACT Inhalation Aero Soln TAKE 2 PUFFS BY MOUTH EVERY 4 TO 6 HOURS AS NEEDED    BD PEN NEEDLE KADEN 2ND GEN 32G X 4 MM Does not apply Misc 1 EACH DAILY. USE DAILY WITH INSULIN    Blood Glucose Monitoring Suppl (BLOOD GLUCOSE SYSTEM GILLES) Does not apply Kit Dx. E11.9. Checks qam.    Blood Glucose Monitoring Suppl w/Device Does not apply Kit Dx. E11.9. Checks qam.    Blood Gluc Meter Disp-Strips Does not apply Device Dx. E11.9. Checks qam.    Accu-Chek FastClix Lancets Does not apply Misc Check sugar once daily as directed (E11.42)    Blood Glucose Monitoring Suppl (ONETOUCH VERIO) w/Device Does not apply Kit 1 each daily. Test 1x daily    OneTouch Delica Lancets 33G Does not apply Misc 4 x daily    acetaminophen 500 MG Oral Tab Take 1 tablet (500 mg total) by mouth daily. (Patient not taking: Reported on 4/29/2025)   [7]   Allergies  Allergen Reactions    Celebrex [Celecoxib] PALPITATIONS    Penicillins ITCHING and SWELLING    Amlodipine OTHER (SEE COMMENTS)     Calves Swelling     Metformin And Related UNKNOWN    Olmesartan UNKNOWN

## 2025-04-29 NOTE — ED INITIAL ASSESSMENT (HPI)
Patient presents to the ED c/o headache and SOB since last night. Hx COPD   Patient's O2 saturation 84% on room air. Patient placed on 2L nasal cannula. Pt states she does not wear O2 at home.

## 2025-04-30 LAB
ANION GAP SERPL CALC-SCNC: 10 MMOL/L (ref 0–18)
BASOPHILS # BLD AUTO: 0.01 X10(3) UL (ref 0–0.2)
BASOPHILS NFR BLD AUTO: 0.2 %
BUN BLD-MCNC: 19 MG/DL (ref 9–23)
BUN/CREAT SERPL: 16.7 (ref 10–20)
C DIFF TOX B STL QL: NEGATIVE
CALCIUM BLD-MCNC: 8 MG/DL (ref 8.7–10.4)
CHLORIDE SERPL-SCNC: 106 MMOL/L (ref 98–112)
CO2 SERPL-SCNC: 24 MMOL/L (ref 21–32)
CREAT BLD-MCNC: 1.14 MG/DL (ref 0.55–1.02)
DEPRECATED RDW RBC AUTO: 59.7 FL (ref 35.1–46.3)
EGFRCR SERPLBLD CKD-EPI 2021: 47 ML/MIN/1.73M2 (ref 60–?)
EOSINOPHIL # BLD AUTO: 0 X10(3) UL (ref 0–0.7)
EOSINOPHIL NFR BLD AUTO: 0 %
ERYTHROCYTE [DISTWIDTH] IN BLOOD BY AUTOMATED COUNT: 19.4 % (ref 11–15)
GLUCOSE BLD-MCNC: 237 MG/DL (ref 70–99)
GLUCOSE BLDC GLUCOMTR-MCNC: 233 MG/DL (ref 70–99)
GLUCOSE BLDC GLUCOMTR-MCNC: 246 MG/DL (ref 70–99)
GLUCOSE BLDC GLUCOMTR-MCNC: 290 MG/DL (ref 70–99)
GLUCOSE BLDC GLUCOMTR-MCNC: 318 MG/DL (ref 70–99)
HCT VFR BLD AUTO: 27.6 % (ref 35–48)
HGB BLD-MCNC: 8.1 G/DL (ref 12–16)
IMM GRANULOCYTES # BLD AUTO: 0.02 X10(3) UL (ref 0–1)
IMM GRANULOCYTES NFR BLD: 0.4 %
LYMPHOCYTES # BLD AUTO: 0.39 X10(3) UL (ref 1–4)
LYMPHOCYTES NFR BLD AUTO: 7 %
MAGNESIUM SERPL-MCNC: 1.8 MG/DL (ref 1.6–2.6)
MCH RBC QN AUTO: 25.2 PG (ref 26–34)
MCHC RBC AUTO-ENTMCNC: 29.3 G/DL (ref 31–37)
MCV RBC AUTO: 86 FL (ref 80–100)
MONOCYTES # BLD AUTO: 0.03 X10(3) UL (ref 0.1–1)
MONOCYTES NFR BLD AUTO: 0.5 %
NEUTROPHILS # BLD AUTO: 5.11 X10 (3) UL (ref 1.5–7.7)
NEUTROPHILS # BLD AUTO: 5.11 X10(3) UL (ref 1.5–7.7)
NEUTROPHILS NFR BLD AUTO: 91.9 %
OSMOLALITY SERPL CALC.SUM OF ELEC: 300 MOSM/KG (ref 275–295)
PLATELET # BLD AUTO: 197 10(3)UL (ref 150–450)
POTASSIUM SERPL-SCNC: 4.2 MMOL/L (ref 3.5–5.1)
POTASSIUM SERPL-SCNC: 4.2 MMOL/L (ref 3.5–5.1)
RBC # BLD AUTO: 3.21 X10(6)UL (ref 3.8–5.3)
SODIUM SERPL-SCNC: 140 MMOL/L (ref 136–145)
WBC # BLD AUTO: 5.6 X10(3) UL (ref 4–11)

## 2025-04-30 PROCEDURE — 99233 SBSQ HOSP IP/OBS HIGH 50: CPT | Performed by: INTERNAL MEDICINE

## 2025-04-30 PROCEDURE — 99232 SBSQ HOSP IP/OBS MODERATE 35: CPT | Performed by: INTERNAL MEDICINE

## 2025-04-30 RX ORDER — MAGNESIUM OXIDE 400 MG/1
400 TABLET ORAL ONCE
Status: DISCONTINUED | OUTPATIENT
Start: 2025-04-30 | End: 2025-05-05

## 2025-04-30 RX ORDER — METHYLPREDNISOLONE SODIUM SUCCINATE 40 MG/ML
40 INJECTION INTRAMUSCULAR; INTRAVENOUS EVERY 12 HOURS
Status: DISCONTINUED | OUTPATIENT
Start: 2025-05-01 | End: 2025-05-01

## 2025-04-30 NOTE — CM/SW NOTE
04/30/25 1500   CM/SW Referral Data   Referral Source Physician   Reason for Referral Discharge planning   Informant Patient;Daughter;EMR;Clinical Staff Member   Medical Hx   Does patient have an established PCP? Yes  (Dr. Heike Sorto)   Significant Past Medical/Mental Health Hx CVA;COPD;ILD   Patient Info   Advanced directives? Yes   Patient's Current Mental Status at Time of Assessment Alert;Oriented   Patient's Home Environment House   Number of Levels in Home 2   Number of Stair in Home 14 to bedroom   Patient lives with Daughter   Patient Status Prior to Admission   Independent with ADLs and Mobility No   Pt. requires assistance with Ambulating;Driving   Services in place prior to admission DME/Supplies at home;[facility]   Type of DME/Supplies Wheeled Walker;Straight Cane;Shower Chair;Grab Bars;Home Pulse Oximetry   [facility] Provider Private Duty   prison Care hours per day PRN   Discharge Needs   Anticipated D/C needs Outpatient therapy     RACHAEL received MD order for home health evaluation.    Pt admitted for low o2 sats/respiratory failure.  Pt placed on 2.5 L O2.  Plan is to wean O2 as tolerated.   RACHAEL anticipates pt will be weaned off O2 prior to DC. Pulmonology following.    RACHAEL met with pt and her daughter Brea bedside to complete assessment.    RACHAEL confirmed address and PCP on file.    Pt is from home with her Brea in a 2-level home.  At baseline, pt is ambulatory with a RW and.or cane.  Pt does not drive.    Pt does not use home O2/CPAP.      Pt had CVA in 1/25- discharged to [facility] then continued with [facility] outpatient therapy in [place].    Pt/Brea would like to continue with [facility] outpatient therapy.    Brea confirmed SARL needs an order for therapy as well as specifications on any activity restrictions clarified on order.  Care team aware.    PLAN: DC home and continue [facility] outpatient therapy pending med clear    / to remain  available for support and/or discharge planning.     Heike PICKETT, LSW  Discharge Planner

## 2025-04-30 NOTE — PROGRESS NOTES
[facility]  part of [facility]     Progress Note    Chester Bautista Patient Status:  Inpatient    1940 MRN [id number]   Location [facility] 5SW/SE Attending Pérez Aguilera MD   Hosp Day # 1 PCP Heike Sorto MD       Subjective:   Patient seen and examined.  Admits to some gradual improvement dyspnea.  Some occasional cough.    Objective:   Blood pressure 155/76, pulse 82, temperature 97.5 °F (36.4 °C), temperature source Oral, resp. rate 16, weight 139 lb 12.8 oz (63.4 kg), SpO2 96%.  Intake/Output:   Last 3 shifts: I/O last 3 completed shifts:  In: 960 [P.O.:840; I.V.:20; IV PIGGYBACK:100]  Out: 25 [Urine:25]   This shift: I/O this shift:  In: 237 [P.O.:237]  Out: -      Vent Settings:      Hemodynamic parameters (last 24 hours):      Scheduled Meds: Current Hospital Medications[1]    Continuous Infusions: Medication Infusions[2]    Physical Exam  Constitutional: no acute distress  Eyes: PERRL  ENT: nares pateint  Neck: supple, no JVD  Cardio: RRR, S1 S2  Respiratory: Expiratory Rales present  GI: abdomen soft, non tender, active bowel sounds, no organomegaly  Extremities: no clubbing, cyanosis, edema  Neurologic: no gross motor deficits  Skin: warm, dry      Results:     Lab Results   Component Value Date    WBC 5.6 2025    HGB 8.1 2025    HCT 27.6 2025    .0 2025    CREATSERUM 1.14 2025    BUN 19 2025     2025    K 4.2 2025    K 4.2 2025     2025    CO2 24.0 2025     2025    CA 8.0 2025    MG 1.8 2025       XR CHEST AP PORTABLE  (CPT=71045)  Result Date: 2025  CONCLUSION:   Mixed patchy bilateral lung opacity mildly increased since the prior study which may relate to acute infection or edema superimposed on chronic changes.     Dictated by (CST): Gene Austin MD on 2025 at 11:56 AM     Finalized by (CST): Gene Austin MD on  4/29/2025 at 11:59 AM            EKG  Result Date: 4/29/2025  Normal sinus rhythm Possible Left atrial enlargement Anterior infarct (cited on or before 22-OCT-2024) Abnormal ECG When compared with ECG of 17-JAN-2025 19:11, No significant change was found Confirmed by ELVIRA EDWARDS ([id number]) on 4/29/2025 2:04:59 PM      Assessment   1.  COPD with acute exacerbation  2.  CPFE  3.  Possible pneumonia  4.  Acute kidney injury on chronic kidney disease  5.  Anemia     Plan   -Patient presents with evidence of worsening dyspnea over the last several days.  Concern for COPD exacerbation  - Chest x-ray with some patchy bilateral opacities seen cannot rule out underlying pneumonia.  - Continue empiric antibiotic therapy at this time  - Blood cultures thus far with no growth to date  - Gradually wean steroid therapy  - ICS/LABA  - Scheduled nebulizer treatments  - DVT prophylaxis: Heparin    Damon Williamson,   Pulmonary Critical Care Medicine  [facility]        [1]   Current Facility-Administered Medications   Medication Dose Route Frequency    magnesium oxide (Mag-Ox) tab 400 mg  400 mg Oral Once    insulin aspart (NovoLOG) 100 Units/mL FlexPen 3 Units  3 Units Subcutaneous TID CC    [START ON 5/1/2025] methylPREDNISolone sodium succinate (Solu-MEDROL) injection 40 mg  40 mg Intravenous Q12H    azithromycin (Zithromax) tab 500 mg  500 mg Oral Daily    cefTRIAXone (Rocephin) 2 g in sodium chloride 0.9% 100 mL IVPB-ADDV  2 g Intravenous Q24H    ipratropium-albuterol (Duoneb) 0.5-2.5 (3) MG/3ML inhalation solution 3 mL  3 mL Nebulization Q4H PRN    glucose (Dex4) 15 GM/59ML oral liquid 15 g  15 g Oral Q15 Min PRN    Or    glucose (Glutose) 40% oral gel 15 g  15 g Oral Q15 Min PRN    Or    glucose-vitamin C (Dex-4) chewable tab 4 tablet  4 tablet Oral Q15 Min PRN    Or    dextrose 50% injection 50 mL  50 mL Intravenous Q15 Min PRN    Or    glucose (Dex4) 15 GM/59ML oral liquid 30 g  30 g Oral Q15 Min PRN    Or    glucose  (Glutose) 40% oral gel 30 g  30 g Oral Q15 Min PRN    Or    glucose-vitamin C (Dex-4) chewable tab 8 tablet  8 tablet Oral Q15 Min PRN    heparin (Porcine) 5000 UNIT/ML injection 5,000 Units  5,000 Units Subcutaneous 2 times per day    acetaminophen (Tylenol Extra Strength) tab 500 mg  500 mg Oral Q4H PRN    ondansetron (Zofran) 4 MG/2ML injection 4 mg  4 mg Intravenous Q6H PRN    metoclopramide (Reglan) 5 mg/mL injection 5 mg  5 mg Intravenous Q8H PRN    guaiFENesin (Robitussin) 100 MG/5 ML oral liquid 200 mg  200 mg Oral Q4H PRN    benzonatate (Tessalon) cap 200 mg  200 mg Oral TID PRN    temazepam (Restoril) cap 15 mg  15 mg Oral Nightly PRN    aspirin DR tab 81 mg  81 mg Oral Daily    carvedilol (Coreg) tab 25 mg  25 mg Oral BID with meals    gabapentin (Neurontin) cap 100 mg  100 mg Oral Nightly    insulin degludec (Tresiba) 100 units/mL flextouch 14 Units  14 Units Subcutaneous Daily    losartan (Cozaar) tab 25 mg  25 mg Oral Daily    magnesium oxide (Mag-Ox) tab 400 mg  400 mg Oral BID    montelukast (Singulair) tab 10 mg  10 mg Oral Nightly    pantoprazole (Protonix) DR tab 40 mg  40 mg Oral BID AC    rOPINIRole (Requip) tab 0.25 mg  0.25 mg Oral Nightly    rosuvastatin (Crestor) tab 10 mg  10 mg Oral Nightly    sennosides (Senokot) tab 8.6 mg  8.6 mg Oral BID PRN    insulin aspart (NovoLOG) 100 Units/mL FlexPen 1-7 Units  1-7 Units Subcutaneous TID CC and HS    fluticasone-salmeterol (Advair Diskus) 250-50 MCG/ACT inhaler 1 puff  1 puff Inhalation BID   [2]

## 2025-04-30 NOTE — DIETARY NOTE
BRIEF DIETITIAN NOTE    ADMITTING DIAGNOSIS:  Community acquired pneumonia, unspecified laterality [J18.9]  Acute hypoxic respiratory failure (HCC) [J96.01]    Patient Status 04/30/25: Pt screened for MST. Found to be at no nutrition risk at this time. PMHx: COPD, ILD, previous CVA, diabetes mellitus. Visited pt today, daughter in the room.2 Diet Hx: Pt with good appetite and good PO intakes, consuming 80% of most meals per documentation. Pt stated she likes the mac 'n cheese. Pt reported good po PTA, except for the last 2 weeks, about 75% of usual intakes, related to malaise. Weight Hx: Weight relatively stable, pt UBW: 140-144 in the last 6 month, per Wt Hx on EHR. Daughter corroborated this. CBW: 139.8# No significant wt loss noted. Nutrition Plan: Labs reviewed.  Added ONS to help pt maximize nutrient intakes. Pt agreeable to Magic cup which she reported she likes, and trial of Ensure. Will follow per protocol. Please consult RD if earlier nutrition intervention is needed.         Procedures    Cardiac diet Cardiac; Calorie Restriction/Carb Controlled: 1800 kcal/60 grams; Is Patient on Accuchecks? No       Percent Meals Eaten (last 6 days)       Date/Time Percent Meals Eaten (%)    04/29/25 1847 80 %    04/30/25 0951 80 %             Patient Weight(s) for the past 336 hrs:   Weight   04/29/25 1429 63.4 kg (139 lb 12.8 oz)   04/29/25 1008 63.5 kg (140 lb)        Wt Readings from Last 15 Encounters:   04/29/25 63.4 kg (139 lb 12.8 oz)   04/21/25 64.5 kg (142 lb 3.2 oz)   04/10/25 63.8 kg (140 lb 9.6 oz)   04/04/25 64 kg (141 lb)   04/03/25 64 kg (141 lb)   03/31/25 63 kg (139 lb)   03/20/25 63 kg (139 lb)   03/17/25 63.4 kg (139 lb 12.8 oz)   03/10/25 61.1 kg (134 lb 12.8 oz)   02/05/25 60.1 kg (132 lb 6.4 oz)   01/17/25 67.1 kg (147 lb 14.9 oz)   01/16/25 64.9 kg (143 lb)   01/03/25 63 kg (139 lb)   12/16/24 66.2 kg (146 lb)   12/12/24 65.7 kg (144 lb 12.8 oz)        Recent Labs     04/29/25  1103 04/30/25  0610    * 237*   BUN 14 19   CREATSERUM 1.22* 1.14*   CA 8.2* 8.0*   MG 1.6 1.8    140   K 3.1* 4.2  4.2    106   CO2 26.0 24.0   OSMOCALC 292 300*       Medical Food Supplements-RD added Ensure Enlive (350 calories/ 20 g protein each) Daily Vanilla or Chocolate and Magic Cup (290 calories/ 9 g protein each) Daily Vanilla. Rational/use of oral supplements discussed. Will monitor  for tolerance and adequacy.     F/u per protocol or as appropriate.       Annelise English RD, LDN  Clinical Dietitian  Available via Epic securechat  Ext. [phone]

## 2025-04-30 NOTE — PROGRESS NOTES
[facility]  part of [facility] Hospitalist Progress Note     Chester Bautsita Patient Status:  Inpatient    1940 MRN [id number]   Location [facility] 5SW/SE Attending Pérez Aguilera MD   Hosp Day # 1 PCP Heike Sorto MD     Chief Complaint:   Chief Complaint   Patient presents with    Shortness Of Breath        Subjective:     Patient seen sitting in bed.  No family bedside.  Patient reports feeling better today.  Shortness of breath improving.  Continues to have a cough.  Denies subjective fevers or chills.    Objective:      Vital signs:  Vitals:    25 1817 25 2201 25 0634 25 0815   BP: 145/68 143/52 136/60 155/76   BP Location: Left arm Left arm Left arm Left arm   Pulse: 78 81  82   Resp: 16 16 16 16   Temp:  99.2 °F (37.3 °C) 98.3 °F (36.8 °C) 97.5 °F (36.4 °C)   TempSrc:  Oral Oral Oral   SpO2: 99% 91% 94% 96%   Weight:           Intake/Output Summary (Last 24 hours) at 2025 0846  Last data filed at 2025 0600  Gross per 24 hour   Intake 960 ml   Output 25 ml   Net 935 ml           Physical Exam:    GENERAL:  Awake and alert, in no acute distress.  HEART:  Regular rhythm, regular rate  LUNGS:  Air entry was decreased.  No increased work of breathing or wheezes   ABDOMEN: Soft and non-tender.    PSYCHIATRIC: Normal mood    Diagnostic Data:    Labs:    Recent Labs   Lab 25  1103 25  0642   WBC 6.8 5.6   HGB 8.2* 8.1*   MCV 86.3 86.0   .0 197.0       Recent Labs   Lab 25  1103 25  0640   * 237*   BUN 14 19   CREATSERUM 1.22* 1.14*   CA 8.2* 8.0*   ALB 3.3  --     140   K 3.1* 4.2  4.2    106   CO2 26.0 24.0   ALKPHO 67  --    AST 30  --    ALT 9*  --    BILT 0.3  --    TP 6.3  --            Estimated Creatinine Clearance: 26.7 mL/min (A) (based on SCr of 1.14 mg/dL (H)).    No results for input(s): \"PTP\", \"INR\" in the last 168 hours.         COVID-19  Lab Results    Component Value Date    COVID19 Not Detected 04/29/2025    COVID19 Not Detected 01/17/2025    COVID19 Not Detected 02/02/2024       Pro-Calcitonin  No results for input(s): \"PCT\" in the last 168 hours.    Cardiac  Recent Labs   Lab 04/29/25  1103   PBNP 1,263*       Inflammatory Markers  No results for input(s): \"CRP\", \"ROSALIE\", \"LDH\", \"DDIMER\" in the last 168 hours.    Culture:  No results found for this visit on 04/29/25.    XR CHEST AP PORTABLE  (CPT=71045)  Result Date: 4/29/2025  CONCLUSION:   Mixed patchy bilateral lung opacity mildly increased since the prior study which may relate to acute infection or edema superimposed on chronic changes.     Dictated by (CST): Gene Austin MD on 4/29/2025 at 11:56 AM     Finalized by (CST): Gene Austin MD on 4/29/2025 at 11:59 AM            EKG  Result Date: 4/29/2025  Normal sinus rhythm Possible Left atrial enlargement Anterior infarct (cited on or before 22-OCT-2024) Abnormal ECG When compared with ECG of 17-JAN-2025 19:11, No significant change was found Confirmed by ELVIRA EDWARDS ([id number]) on 4/29/2025 2:04:59 PM      Medications: Scheduled Medications[1]    Assessment & Plan:        Acute hypoxic respiratory failure (HCC)  -Patient presenting with increased shortness of breath with cough  -Noted to be hypoxic requiring supplemental O2, weaning as able   -Patient with history of COPD, ILD   -Chest x-ray reviewed.  Noted mixed patchy bilateral lung opacities.  - Concern for possible acute COPD exacerbation   -Unable to rule out community acquired pneumonia  - Continue on steroids, DuoNebs, inhalers.  - Continue empiric antibiotics  - Symptom relief as able  - Pulmonology on consult, appreciate further recommendations    Acute Kidney Injury   -Improving with IV fluid.  - Avoiding Nephrotoxic agents  - Cont to monitor    Type 2 DM   -With hyperglycemia  -Likely worsened due to steroid use as above  -Continue Tresiba 14 units daily  -Add scheduled mealtime  dosing.  - Monitoring Blood glucose with POC checks  - SSI to cover hyperglycemia  - Hypoglycemia protocol  - Will monitor and adjust agents as needed.      History of CVA  - Continue home regimen      Plan of care discussed with patient at bedside . Discussed management/test result(s) with Rn     Quality:  DVT Prophylaxis: Heparin  CODE status: Full  Estimated date of discharge: TBD  Discharge is dependent on: clinical stability    Pérez Aguilera MD          This note was prepared using Dragon Medical voice recognition dictation software. As a result errors may occur. When identified these errors have been corrected. While every attempt is made to correct errors during dictation discrepancies may still exist         [1]    magnesium oxide  400 mg Oral Once    azithromycin  500 mg Oral Daily    cefTRIAXone  2 g Intravenous Q24H    heparin  5,000 Units Subcutaneous 2 times per day    aspirin  81 mg Oral Daily    carvedilol  25 mg Oral BID with meals    gabapentin  100 mg Oral Nightly    insulin degludec  14 Units Subcutaneous Daily    losartan  25 mg Oral Daily    magnesium oxide  400 mg Oral BID    montelukast  10 mg Oral Nightly    pantoprazole  40 mg Oral BID AC    rOPINIRole  0.25 mg Oral Nightly    rosuvastatin  10 mg Oral Nightly    insulin aspart  1-7 Units Subcutaneous TID CC and HS    methylPREDNISolone  40 mg Intravenous Q8H    fluticasone-salmeterol  1 puff Inhalation BID

## 2025-05-01 ENCOUNTER — TELEPHONE (OUTPATIENT)
Dept: INTERNAL MEDICINE CLINIC | Facility: CLINIC | Age: 85
End: 2025-05-01

## 2025-05-01 ENCOUNTER — PATIENT MESSAGE (OUTPATIENT)
Dept: ENDOCRINOLOGY CLINIC | Facility: CLINIC | Age: 85
End: 2025-05-01

## 2025-05-01 LAB
ANION GAP SERPL CALC-SCNC: 9 MMOL/L (ref 0–18)
BASOPHILS # BLD AUTO: 0.01 X10(3) UL (ref 0–0.2)
BASOPHILS NFR BLD AUTO: 0.1 %
BUN BLD-MCNC: 27 MG/DL (ref 9–23)
BUN/CREAT SERPL: 22.9 (ref 10–20)
CALCIUM BLD-MCNC: 8.8 MG/DL (ref 8.7–10.4)
CHLORIDE SERPL-SCNC: 102 MMOL/L (ref 98–112)
CO2 SERPL-SCNC: 25 MMOL/L (ref 21–32)
CREAT BLD-MCNC: 1.18 MG/DL (ref 0.55–1.02)
DEPRECATED RDW RBC AUTO: 57.7 FL (ref 35.1–46.3)
EGFRCR SERPLBLD CKD-EPI 2021: 45 ML/MIN/1.73M2 (ref 60–?)
EOSINOPHIL # BLD AUTO: 0 X10(3) UL (ref 0–0.7)
EOSINOPHIL NFR BLD AUTO: 0 %
ERYTHROCYTE [DISTWIDTH] IN BLOOD BY AUTOMATED COUNT: 19.5 % (ref 11–15)
GLUCOSE BLD-MCNC: 326 MG/DL (ref 70–99)
GLUCOSE BLDC GLUCOMTR-MCNC: 138 MG/DL (ref 70–99)
GLUCOSE BLDC GLUCOMTR-MCNC: 146 MG/DL (ref 70–99)
GLUCOSE BLDC GLUCOMTR-MCNC: 328 MG/DL (ref 70–99)
GLUCOSE BLDC GLUCOMTR-MCNC: 361 MG/DL (ref 70–99)
HCT VFR BLD AUTO: 27.1 % (ref 35–48)
HGB BLD-MCNC: 8 G/DL (ref 12–16)
IMM GRANULOCYTES # BLD AUTO: 0.06 X10(3) UL (ref 0–1)
IMM GRANULOCYTES NFR BLD: 0.5 %
LYMPHOCYTES # BLD AUTO: 0.64 X10(3) UL (ref 1–4)
LYMPHOCYTES NFR BLD AUTO: 5.1 %
MAGNESIUM SERPL-MCNC: 2 MG/DL (ref 1.6–2.6)
MCH RBC QN AUTO: 25.1 PG (ref 26–34)
MCHC RBC AUTO-ENTMCNC: 29.5 G/DL (ref 31–37)
MCV RBC AUTO: 85 FL (ref 80–100)
MONOCYTES # BLD AUTO: 0.19 X10(3) UL (ref 0.1–1)
MONOCYTES NFR BLD AUTO: 1.5 %
NEUTROPHILS # BLD AUTO: 11.66 X10 (3) UL (ref 1.5–7.7)
NEUTROPHILS # BLD AUTO: 11.66 X10(3) UL (ref 1.5–7.7)
NEUTROPHILS NFR BLD AUTO: 92.8 %
OSMOLALITY SERPL CALC.SUM OF ELEC: 300 MOSM/KG (ref 275–295)
PLATELET # BLD AUTO: 243 10(3)UL (ref 150–450)
POTASSIUM SERPL-SCNC: 4.2 MMOL/L (ref 3.5–5.1)
RBC # BLD AUTO: 3.19 X10(6)UL (ref 3.8–5.3)
SODIUM SERPL-SCNC: 136 MMOL/L (ref 136–145)
WBC # BLD AUTO: 12.6 X10(3) UL (ref 4–11)

## 2025-05-01 PROCEDURE — 99232 SBSQ HOSP IP/OBS MODERATE 35: CPT | Performed by: INTERNAL MEDICINE

## 2025-05-01 PROCEDURE — 99233 SBSQ HOSP IP/OBS HIGH 50: CPT | Performed by: INTERNAL MEDICINE

## 2025-05-01 RX ORDER — INSULIN DEGLUDEC 100 U/ML
20 INJECTION, SOLUTION SUBCUTANEOUS DAILY
Status: DISCONTINUED | OUTPATIENT
Start: 2025-05-02 | End: 2025-05-02

## 2025-05-01 RX ORDER — PREDNISONE 20 MG/1
40 TABLET ORAL
Status: DISCONTINUED | OUTPATIENT
Start: 2025-05-02 | End: 2025-05-03

## 2025-05-01 RX ORDER — FUROSEMIDE 10 MG/ML
40 INJECTION INTRAMUSCULAR; INTRAVENOUS ONCE
Status: COMPLETED | OUTPATIENT
Start: 2025-05-01 | End: 2025-05-01

## 2025-05-01 NOTE — PROGRESS NOTES
[facility]  part of [facility]    Progress Note      Assessment and Plan:   1.  Acute on chronic respiratory failure-COPD, possible pneumonitis with chronic severe anemia.  Patient looks good at present and is off oxygen.  She has combined pulmonary fibrosis and emphysema.  No definitive evidence of pneumonia and the infiltrates could represent mild edema and patient who gets frequent transfusions.    Recommendations:  1.  Discharge planning  2.  Ongoing management of anemia by hematology Dr. Mohamud.  3.  At the time of discharge, switch to oral antibiotic for 2-3 more days for the outpatient setting.  4.  MDI and nebulizer therapy  5.  Will give a dose of Lasix.    2.  DVT prophylaxis-subcutaneous heparin    3.  Multiple strokes with history of aortic arch thrombus status post stenting    4.  AVMs with history of GI bleed    5.  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    Subjective:   Chester Bautista is a(n) 85 year old female who is breathing more comfortably    Objective:   Blood pressure 153/65, pulse 79, temperature 97.7 °F (36.5 °C), temperature source Oral, resp. rate 16, weight 139 lb 12.8 oz (63.4 kg), SpO2 95%.    Physical Exam alert black female  HEENT examination is unremarkable with pupils equal round and reactive to light and accommodation.   Neck without adenopathy, thyromegaly, JVD nor bruit.   Lungs diminished to auscultation and percussion.  Cardiac regular rate and rhythm no murmur.   Abdomen nontender, without hepatosplenomegaly and no mass appreciable.   Extremities without clubbing cyanosis nor edema.   Neurologic grossly intact with symmetric tone and strength and reflex.  Skin without gross abnormality     Results:     Lab Results   Component Value Date    WBC 12.6 05/01/2025    HGB 8.0 05/01/2025    HCT 27.1 05/01/2025    .0 05/01/2025    CREATSERUM 1.18 05/01/2025    BUN 27 05/01/2025     05/01/2025    K 4.2 05/01/2025      05/01/2025    CO2 25.0 05/01/2025     05/01/2025    CA 8.8 05/01/2025    MG 2.0 05/01/2025       Chapin Parker MD  Medical Director, Critical Care, [facility]  Medical Director, [facility]  Pager: [phone]

## 2025-05-01 NOTE — TELEPHONE ENCOUNTER
The daughter calling to state that the patient was admitted to [facility] and just wanted to make sure Dr. Sorto is aware.    You do not need to route back.

## 2025-05-01 NOTE — PROGRESS NOTES
[facility]  part of [facility]     Hospitalist Progress Note     Chester Bautista Patient Status:  Inpatient    1940 MRN [id number]   Location [facility] 5SW/SE Attending Pérez Aguilera MD   Hosp Day # 2 PCP Heike Sorto MD     Chief Complaint:   Chief Complaint   Patient presents with    Shortness Of Breath        Subjective:     Patient seen sitting in bed.  No family bedside.  Patient denies acute events overnight.  Patient states feeling a little short of breath and wheezy this a.m.  Continues to have a cough, but improved.  Denies subjective fevers or chills.    Objective:      Vital signs:  Vitals:    25 1500 25 1721 25 2154 25 0635   BP:  154/58 145/61 (!) 165/75   BP Location:  Left arm Left arm Left arm   Pulse:  81 76 79   Resp:  16 16 16   Temp:  98.2 °F (36.8 °C) 98.5 °F (36.9 °C) 98.2 °F (36.8 °C)   TempSrc:  Axillary Oral Oral   SpO2: 93% 95% 97% 93%   Weight:           Intake/Output Summary (Last 24 hours) at 2025 1003  Last data filed at 2025 0939  Gross per 24 hour   Intake 1500 ml   Output --   Net 1500 ml           Physical Exam:    GENERAL:  Awake and alert, in no acute distress.  HEART:  Regular rhythm, regular rate  LUNGS:  Air entry was decreased.  No increased work of breathing. Faint end expiratory wheezes noted  ABDOMEN: Soft and non-tender.    PSYCHIATRIC: Normal mood    Diagnostic Data:    Labs:    Recent Labs   Lab 25  1103 25  0642 25  0633   WBC 6.8 5.6 12.6*   HGB 8.2* 8.1* 8.0*   MCV 86.3 86.0 85.0   .0 197.0 243.0       Recent Labs   Lab 25  1103 25  0640 25  0646   * 237* 326*   BUN 14 19 27*   CREATSERUM 1.22* 1.14* 1.18*   CA 8.2* 8.0* 8.8   ALB 3.3  --   --     140 136   K 3.1* 4.2  4.2 4.2    106 102   CO2 26.0 24.0 25.0   ALKPHO 67  --   --    AST 30  --   --    ALT 9*  --   --    BILT 0.3  --   --    TP 6.3  --   --             Estimated Creatinine Clearance: 25.8 mL/min (A) (based on SCr of 1.18 mg/dL (H)).    No results for input(s): \"PTP\", \"INR\" in the last 168 hours.         COVID-19  Lab Results   Component Value Date    COVID19 Not Detected 04/29/2025    COVID19 Not Detected 01/17/2025    COVID19 Not Detected 02/02/2024       Pro-Calcitonin  No results for input(s): \"PCT\" in the last 168 hours.    Cardiac  Recent Labs   Lab 04/29/25  1103   PBNP 1,263*       Inflammatory Markers  No results for input(s): \"CRP\", \"ROSALIE\", \"LDH\", \"DDIMER\" in the last 168 hours.    Culture:  Hospital Encounter on 04/29/25   1. Blood Culture     Status: None (Preliminary result)    Collection Time: 04/29/25  1:01 PM    Specimen: Blood,peripheral   Result Value Ref Range    Blood Culture Result No Growth 1 Day N/A       XR CHEST AP PORTABLE  (CPT=71045)  Result Date: 4/29/2025  CONCLUSION:   Mixed patchy bilateral lung opacity mildly increased since the prior study which may relate to acute infection or edema superimposed on chronic changes.     Dictated by (CST): Gene Austin MD on 4/29/2025 at 11:56 AM     Finalized by (CST): Gene Austin MD on 4/29/2025 at 11:59 AM            EKG  Result Date: 4/29/2025  Normal sinus rhythm Possible Left atrial enlargement Anterior infarct (cited on or before 22-OCT-2024) Abnormal ECG When compared with ECG of 17-JAN-2025 19:11, No significant change was found Confirmed by ELVIRA EDWARDS ([id number]) on 4/29/2025 2:04:59 PM      Medications: Scheduled Medications[1]    Assessment & Plan:        Acute hypoxic respiratory failure (HCC)  -Patient presenting with increased shortness of breath with cough  -Noted to be hypoxic requiring supplemental O2, weaning as able   -Patient with history of COPD, ILD   -Chest x-ray reviewed.  Noted mixed patchy bilateral lung opacities.  - Concern for possible acute COPD exacerbation   -Unable to rule out community acquired pneumonia  - Continue on steroids, transitioning  to p.o. prednisone.   - Continue DuoNebs and scheduled inhalers.  - Continue empiric antibiotics  - Symptom relief as able  - Pulmonology on consult, appreciate further recommendations    Acute Kidney Injury   -Improving   - Discontinue IV fluid.  - Avoiding Nephrotoxic agents  - Cont to monitor    Type 2 DM   -With hyperglycemia  -Likely worsened due to steroid use as above  - Increase Tresiba to 20 units daily  - Increase scheduled mealtime dosing to 7 units.  - Monitoring Blood glucose with POC checks  - SSI to cover hyperglycemia  - Hypoglycemia protocol  - Will monitor and adjust agents as needed.      History of CVA  - Continue home regimen      Plan of care discussed with patient at bedside . Discussed management/test result(s) with Rn     Quality:  DVT Prophylaxis: Heparin  CODE status: Full  Estimated date of discharge: TBD  Discharge is dependent on: clinical stability    Pérez Aguilera MD          This note was prepared using Dragon Medical voice recognition dictation software. As a result errors may occur. When identified these errors have been corrected. While every attempt is made to correct errors during dictation discrepancies may still exist         [1]    [START ON 5/2/2025] predniSONE  40 mg Oral Daily with breakfast    magnesium oxide  400 mg Oral Once    insulin aspart  3 Units Subcutaneous TID CC    azithromycin  500 mg Oral Daily    cefTRIAXone  2 g Intravenous Q24H    heparin  5,000 Units Subcutaneous 2 times per day    aspirin  81 mg Oral Daily    carvedilol  25 mg Oral BID with meals    gabapentin  100 mg Oral Nightly    insulin degludec  14 Units Subcutaneous Daily    losartan  25 mg Oral Daily    magnesium oxide  400 mg Oral BID    montelukast  10 mg Oral Nightly    pantoprazole  40 mg Oral BID AC    rOPINIRole  0.25 mg Oral Nightly    rosuvastatin  10 mg Oral Nightly    insulin aspart  1-7 Units Subcutaneous TID CC and HS    fluticasone-salmeterol  1 puff Inhalation BID

## 2025-05-01 NOTE — PAYOR COMM NOTE
--------------  ADMISSION REVIEW     [place] [facility] [place] #:  [id number]  Authorization Number: [id number]    Admit date: 4/29/25  Admit time:  2:26 PM          ED Provider Notes        History   HPI  85-year-old female presents for evaluation for shortness of breath.  Patient reports that she started feeling short of breath last night.  They were watching her oxygen saturation at home and her oxygen level was in the low 80s.  They did use her rescue inhaler last night.  She has been coughing.  She does report some swelling to her right foot.  She does have an IVC filter.  She denies any fevers, chills, chest pain, nausea, vomiting.  Daughter reports that she has a history of AVMs and did need a blood transfusion on Monday last week.    ED Triage Vitals [04/29/25 1008]   /66   Pulse 79   Resp 20   Temp 97.7 °F (36.5 °C)   Temp src Oral   SpO2 (!) 84 %   O2 Device None (Room air)     Current Vitals:   Vital Signs  BP: (!) 127/94  Pulse: 71  Resp: 18  Temp: 97.7 °F (36.5 °C)  Temp src: Oral  MAP (mmHg): (!) 105    Oxygen Therapy  SpO2: 99 %  O2 Device: Nasal cannula  O2 Flow Rate (L/min): 2 L/min    HENT:      Head: Normocephalic and atraumatic.   Cardiovascular:      Rate and Rhythm: Normal rate and regular rhythm.      Pulses: Normal pulses.   Pulmonary:      Effort: Pulmonary effort is normal.      Breath sounds: Wheezing present.   Musculoskeletal:         General: Normal range of motion.      Cervical back: Normal range of motion.   Skin:     General: Skin is warm and dry.   Neurological:      General: No focal deficit present.      Mental Status: She is alert.     Labs Reviewed   CBC WITH DIFFERENTIAL WITH PLATELET - Abnormal; Notable for the following components:       Result Value    RBC 3.13 (*)     HGB 8.2 (*)     HCT 27.0 (*)     MCHC 30.4 (*)     RDW-SD 60.1 (*)     RDW 19.9 (*)     All other components within normal limits   COMP METABOLIC PANEL (14) - Abnormal; Notable for the following  components:    Glucose 154 (*)     Potassium 3.1 (*)     Creatinine 1.22 (*)     Calcium, Total 8.2 (*)     eGFR-Cr 43 (*)     ALT 9 (*)     All other components within normal limits   PRO BETA NATRIURETIC PEPTIDE - Abnormal; Notable for the following components:    Pro-Beta Natriuretic Peptide 1,263 (*)    EKG    Rate, intervals and axes as noted on EKG Report.  Rate: 80  Rhythm: Sinus Rhythm  Reading: no stemi, interpreted by myself    Admission disposition: 4/29/2025  1:12 PM    Disposition and Plan     Clinical Impression:  1. Acute hypoxic respiratory failure (HCC)    2. Community acquired pneumonia, unspecified laterality       Disposition:  Admit  4/29/2025  1:12 pm           HISTORY AND PHYSICAL EXAMINATION   CHIEF COMPLAINT:  COPD exacerbation and possible community-acquired pneumonia.     HISTORY OF PRESENT ILLNESS:  The patient is an 85-year-old  female with known underlying chronic obstructive pulmonary disease, presented to the emergency department for evaluation of progressive shortness of breath and low oxygen saturation at home per her family with increased cough.  Pulse ox 84% on upon arrival.  CBC showed hemoglobin of 8.2, slightly below her baseline.   BUN and creatinine of 14 and 1.22.  GFR is 43, which is at baseline.  Magnesium 1.6.  GeneXpert viral panel was negative.  ProBNP 1200.  Blood cultures were obtained.  Lactic acid 0.9.  Chest x-ray showed mixed patchy bilateral lung opacities, mildly increased since prior study which may reflect acute infection or edema.  The patient was started on IV antibiotics, Solu-Medrol, nebulizer treatments, and she will be admitted to the hospital for further management.     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     PAST SURGICAL HISTORY:  Left subclavian artery stent; cataract procedure; IVC filter insertion and removal; total abdominal hysterectomy; right breast lumpectomy for breast cancer plus radiation therapy; bilateral total knee arthroplasties; right foot bunionectomy; Nissen fundoplication; shoulder rotator cuff repair.      REVIEW OF SYSTEMS:  The patient said she does not recall exact sick contacts and she does not choke on her food but she has been having cough more recently productive of greenish phlegm.  She felt subjective fevers at home with increased wheezing and shortness of breath for the last 4 to 5 days.      PHYSICAL EXAMINATION:    GENERAL:  Alert, oriented to time, place, and person, mild distress.   VITAL SIGNS:  Temperature 97.7.  Pulse 80.  Respiratory rate 23.  Blood pressure 98/84.  Pulse ox 82% on room air, 100% on nasal cannula oxygen.  HEENT:  Atraumatic.  Oropharynx clear.  Dry mucous membranes.  Ears, nose normal.  Eyes:  Anicteric sclerae.   NECK:  Supple.  No lymphadenopathy.  Trachea midline.   LUNGS:  Bilateral expiratory wheezing with increased respiratory effort.  HEART:  Regular rate and rhythm.  S1 and S2 auscultated.  No murmur.  ABDOMEN:  Soft, nondistended.  No tenderness.  Positive bowel sounds.  EXTREMITIES:  No peripheral edema, clubbing, or cyanosis.  NEUROLOGIC:  Motor and sensory intact.      ASSESSMENT:    1.       Acute COPD exacerbation with acute on chronic hypoxic respiratory failure.  2.       Pulmonary infiltrates on chest x-ray.  This could be related to progressive pulmonary fibrosis versus community-acquired pneumonia.  The patient does have recent symptoms of subjective chills and increased cough with productive phlegm.  3.       Anemia of chronic kidney  disease.  4.       Chronic kidney disease stage 3.     PLAN:  The patient will be admitted to general medical floor.  IV Solu-Medrol.  Nebulizer treatments.  IV antibiotics, Rocephin and azithromycin.  Oxygen support.  Obtain Pulmonary consult.  Sputum cultures.  Follow up on blood cultures.              4/30/25     Subjective:    Continues to have a cough.         04/29/25 1817 04/29/25 2201 04/30/25 0634 04/30/25 0815   BP: 145/68 143/52 136/60 155/76   BP Location: Left arm Left arm Left arm Left arm   Pulse: 78 81   82   Resp: 16 16 16 16   Temp:   99.2 °F (37.3 °C) 98.3 °F (36.8 °C) 97.5 °F (36.4 °C)   TempSrc:   Oral Oral Oral   SpO2: 99% 91% 94% 96%      HEART:  Regular rhythm, regular rate  LUNGS:  Air entry was decreased.  No increased work of breathing or wheezes   ABDOMEN: Soft and non-tender.    PSYCHIATRIC: Normal mood     Lab 04/29/25  1103 04/30/25  0642   WBC 6.8 5.6   HGB 8.2* 8.1*   MCV 86.3 86.0   .0 197.0      Lab 04/29/25  1103 04/30/25  0640   * 237*   BUN 14 19   CREATSERUM 1.22* 1.14*   CA 8.2* 8.0*   ALB 3.3  --     140   K 3.1* 4.2  4.2    106   CO2 26.0 24.0   ALKPHO 67  --    AST 30  --    ALT 9*  --    BILT 0.3  --    TP 6.3  --       Lab 04/29/25  1103   PBNP 1,263*       XR CHEST AP PORTABLE  (CPT=71045)  Result Date: 4/29/2025  CONCLUSION:   Mixed patchy bilateral lung opacity mildly increased since the prior study which may relate to acute infection or edema superimposed on chronic changes.     Dictated by (CST): Gene Austin MD on 4/29/2025 at 11:56 AM     Finalized by (CST): Gene Austin MD on 4/29/2025 at 11:59 AM            EKG  Result Date: 4/29/2025  Normal sinus rhythm Possible Left atrial enlargement Anterior infarct (cited on or before 22-OCT-2024) Abnormal ECG When compared with ECG of 17-JAN-2025 19:11, No significant change was found Confirmed by ELVIRA EDWARDS ([id number]) on 4/29/2025 2:04:59 PM     [Scheduled Medications]   magnesium  oxide  400 mg Oral Once    azithromycin  500 mg Oral Daily    cefTRIAXone  2 g Intravenous Q24H    heparin  5,000 Units Subcutaneous 2 times per day    aspirin  81 mg Oral Daily    carvedilol  25 mg Oral BID with meals    gabapentin  100 mg Oral Nightly    insulin degludec  14 Units Subcutaneous Daily    losartan  25 mg Oral Daily    magnesium oxide  400 mg Oral BID    montelukast  10 mg Oral Nightly    pantoprazole  40 mg Oral BID AC    rOPINIRole  0.25 mg Oral Nightly    rosuvastatin  10 mg Oral Nightly    insulin aspart  1-7 Units Subcutaneous TID CC and HS    methylPREDNISolone  40 mg Intravenous Q8H    fluticasone-salmeterol  1 puff Inhalation BID      Assessment & Plan:         Acute hypoxic respiratory failure (HCC)  -Patient presenting with increased shortness of breath with cough  -Noted to be hypoxic requiring supplemental O2, weaning as able   -Patient with history of COPD, ILD   -Chest x-ray reviewed.  Noted mixed patchy bilateral lung opacities.  - Concern for possible acute COPD exacerbation   -Unable to rule out community acquired pneumonia  - Continue on steroids, DuoNebs, inhalers.  - Continue empiric antibiotics  - Symptom relief as able  - Pulmonology on consult, appreciate further recommendations     Acute Kidney Injury   -Improving with IV fluid.  - Avoiding Nephrotoxic agents  - Cont to monitor     Type 2 DM   -With hyperglycemia  -Likely worsened due to steroid use as above  -Continue Tresiba 14 units daily  -Add scheduled mealtime dosing.  - Monitoring Blood glucose with POC checks  - SSI to cover hyperglycemia  - Hypoglycemia protocol  - Will monitor and adjust agents as needed.      History of CVA  - Continue home regimen                     PULMONOLOGY  Assessment   1.  COPD with acute exacerbation  2.  CPFE  3.  Possible pneumonia  4.  Acute kidney injury on chronic kidney disease  5.  Anemia      Plan   -Patient presents with evidence of worsening dyspnea over the last several days.   Concern for COPD exacerbation  - Chest x-ray with some patchy bilateral opacities seen cannot rule out underlying pneumonia.  - Continue empiric antibiotic therapy at this time  - Blood cultures thus far with no growth to date  - Gradually wean steroid therapy  - ICS/LABA  - Scheduled nebulizer treatments  - DVT prophylaxis: Heparin         Medications 04/29 04/30 05/01   acetaminophen (Tylenol Extra Strength) tab 500 mg  Dose: 500 mg  Freq: Every 4 hours PRN Route: OR  PRN Reason: Fever  PRN Comment: equal to or greater than 100.4  Start: 04/29/25 1352   Admin Instructions:   do not initiate oral therapy until 6-8 hours after the last IV acetaminophen dose if IV acetaminophen was used previously    [id number]         aspirin DR tab 81 mg  Dose: 81 mg  Freq: Daily Route: OR  Start: 04/29/25 1515   Admin Instructions:   Do not crush    28031      57450      0930        azithromycin (Zithromax) tab 500 mg  Dose: 500 mg  Freq: Daily Route: OR  Start: 04/29/25 2100 End: 05/02/25 2059   Admin Instructions:   (Pharmacist may adjust total duration to 3 days if prior doses received.)  Consider alternative antibiotic if baseline QTc >500 ms   Order specific questions:       01276      18667      2100        carvedilol (Coreg) tab 25 mg  Dose: 25 mg  Freq: 2 times daily with meals Route: OR  Start: 04/29/25 1800    79993      92745     69892      0800     1800        cefTRIAXone (Rocephin) 2 g in sodium chloride 0.9% 100 mL IVPB-ADDV  Dose: 2 g  Freq: Every 24 hours Route: IV  Start: 04/30/25 1300 End: 05/04/25 1259   Admin Instructions:   (Pharmacist may adjust total duration to 5 days if prior doses received.)  Ceftriaxone must NOT be administered simultaneously with calcium containing IV solutions. Includes Y-site as well.  In patients other than neonates ceftriaxone and calcium containing products may administered sequentially, provided the line is flushed in between administrations.   Order specific questions:         [id number]      1300        fluticasone-salmeterol (Advair Diskus) 250-50 MCG/ACT inhaler 1 puff  Dose: 1 puff  Freq: 2 times daily Route: IN  Start: 04/29/25 2100   Admin Instructions:   RN to administer  RT to assess patient before administering first dose.   Rinse and spit after use.  RT to administer first dose.    [id number]      [id number]     718010      0900     2100        gabapentin (Neurontin) cap 100 mg  Dose: 100 mg  Freq: Nightly Route: OR  Start: 04/29/25 2100 220514 220115      2100        heparin (Porcine) 5000 UNIT/ML injection 5,000 Units  Dose: 5,000 Units  Freq: 2 times per day Route: SC  Start: 04/29/25 2100 220516      [id number] 215918 0900 2100        insulin aspart (NovoLOG) 100 Units/mL FlexPen 1-7 Units  Dose: 1-7 Units  Freq: 3 times daily with meals and at bedtime Route: SC  Start: 04/29/25 1530   Admin Instructions:   CORRECTION FACTOR - MEDIUM DOSE  Continue to give correction insulin even if NPO  DO NOT HOLD OR ALTER INSULIN DOSE WITHOUT A PHYSICIAN ORDER    Give 1 unit for blood glucose 150-180 mg/dL  Give 2 units for blood glucose 181-210 mg/dL  Give 3 units for blood glucose 211-240 mg/dL  Give 4 units for blood glucose 241-270 mg/dL  Give 5 units for blood glucose 271-300 mg/dL  Give 6 units for blood glucose 301-330 mg/dL  Give 7 units for blood glucose 331-360 mg/dL  Call physician if blood glucose is greater than 360 mg/dL with time and last dose of correction insulin given.    [id number]     [phone]      049998     012430     684029     826364      0800 1200     1700 2100        insulin aspart (NovoLOG) 100 Units/mL FlexPen 3 Units  Dose: 3 Units  Freq: 3 times daily with meals Route: SC  Start: 04/30/25 1700   Admin Instructions:   Give within 10 minutes before or after a meal.  Do not give if patient is NPO (carbohydrate is on hold)     225113      0800     1200     1700        insulin degludec (Tresiba) 100 units/mL flextouch 14 Units  Dose: 14  Units  Freq: Daily Route: SC  Start: 04/30/25 0930   Admin Instructions:   This is LONG ACTING insulin.  Continue to give basal insulin (insulin degludec - Tresiba) even if NPO.  DO NOT hold or alter insulin dose without a physician order.     [id number]      0930        losartan (Cozaar) tab 25 mg  Dose: 25 mg  Freq: Daily Route: OR  Start: 04/30/25 0930     [id number]      0930        magnesium oxide (Mag-Ox) tab 400 mg  Dose: 400 mg  Freq: 2 times daily Route: OR  Start: 04/29/25 2100    072970      514808     873078      0900 2100        methylPREDNISolone sodium succinate (Solu-MEDROL) injection 40 mg  Dose: 40 mg  Freq: Every 12 hours Route: IV  Start: 05/01/25 0100   Admin Instructions:   Reconstitute with 1ml sterile water or saline      [id number] 1300        montelukast (Singulair) tab 10 mg  Dose: 10 mg  Freq: Nightly Route: OR  Start: 04/29/25 2100 220633      248766      2100        pantoprazole (Protonix) DR tab 40 mg  Dose: 40 mg  Freq: 2 times daily before meals Route: OR  Start: 04/29/25 1700   Admin Instructions:   Do not crush    [id number]      012634     021167      940316     1700        rOPINIRole (Requip) tab 0.25 mg  Dose: 0.25 mg  Freq: Nightly Route: OR  Start: 04/29/25 2100 220639      690235      2100        rosuvastatin (Crestor) tab 10 mg  Dose: 10 mg  Freq: Nightly Route: OR  Start: 04/29/25 2100 220541      390144      2100           Medications 04/29 04/30 05/01   cefTRIAXone (Rocephin) 2 g in sodium chloride 0.9% 100 mL IVPB-ADDV  Dose: 2 g  Freq: Once Route: IV  Start: 04/29/25 1317 End: 04/29/25 1353   Admin Instructions:   Ceftriaxone must NOT be administered simultaneously with calcium containing IV solutions. Includes Y-site as well.  In patients other than neonates ceftriaxone and calcium containing products may administered sequentially, provided the line is flushed in between administrations.   Order specific questions:       [id number]          doxycycline hyclate  (Vibramycin) 100 mg in sodium chloride 0.9% 100 mL IVPB  Dose: 100 mg  Freq: Once Route: IV  Start: 04/29/25 1312 End: 04/29/25 1459   Order specific questions:       [id number]     [id number]          ipratropium-albuterol (Duoneb) 0.5-2.5 (3) MG/3ML inhalation solution 3 mL  Dose: 3 mL  Freq: Once Route: Nebulization  Start: 04/29/25 1129 End: 04/29/25 1156   Order specific questions:       [id number]          magnesium oxide (Mag-Ox) tab 400 mg  Dose: 400 mg  Freq: Once Route: OR  Start: 04/29/25 1530 End: 04/29/25 1627    [id number]          methylPREDNISolone sodium succinate (Solu-MEDROL) injection 125 mg  Dose: 125 mg  Freq: Once Route: IV  Start: 04/29/25 1312 End: 04/29/25 1322   Admin Instructions:   Reconstitute with 2ml sterile water or saline    [id number]          potassium chloride (Klor-Con M20) tab 40 mEq  Dose: 40 mEq  Freq: Once Route: OR  Start: 04/29/25 1530 End: 04/29/25 1626   Admin Instructions:   Do not crush    [id number]             Medications 04/29 04/30 05/01   methylPREDNISolone sodium succinate (Solu-MEDROL) injection 40 mg  Dose: 40 mg  Freq: Every 8 hours Route: IV  Start: 04/29/25 2100 End: 04/30/25 1436   Admin Instructions:   Reconstitute with 1ml sterile water or saline    [id number]      [id number]     689907               Vitals (last day)       Date/Time Temp Pulse Resp BP SpO2 Weight O2 Device O2 Flow Rate (L/min) [facility]    05/01/25 0635 98.2 °F (36.8 °C) 79 16 165/75 93 % -- None (Room air) -- HB    04/30/25 2154 98.5 °F (36.9 °C) 76 16 145/61 97 % -- -- -- HB    04/30/25 1721 98.2 °F (36.8 °C) 81 16 154/58 95 % -- Nasal cannula 1 L/min     04/30/25 0815 97.5 °F (36.4 °C) 82 16 155/76 96 % -- Nasal cannula 2.5 L/min     04/30/25 0634 98.3 °F (36.8 °C) -- 16 136/60 94 % -- Nasal cannula 2.5 L/min CG

## 2025-05-01 NOTE — PROGRESS NOTES
[facility]    CDI Prediction Tool Protocol (Vancomycin Prophylaxis Deferred)      This patient is currently at high risk for developing CDI due to his/her score being >/= 13 points.  The current score is: 13    Score Breakdown:  High risk antibiotic use (5 points)  Malignancy (3 points)     Age >/= 80 years (3 points)  PPI use in hospital (1 point)  Recently hospitalized: within 90 days (1 point)      However, they are not being initiated on OVP (oral vancomycin prophylaxis) at this time because no active malignancy.    This patient does not have C. difficile infection. All measures taken within this protocol are to assess this patient and potentially decrease the risk of CDI development.    Ray Long, PharmD  5/1/2025  8:48 AM  [facility]: [phone]

## 2025-05-02 ENCOUNTER — APPOINTMENT (OUTPATIENT)
Age: 85
End: 2025-05-02
Attending: INTERNAL MEDICINE
Payer: MEDICARE

## 2025-05-02 LAB
ANION GAP SERPL CALC-SCNC: 6 MMOL/L (ref 0–18)
BASOPHILS # BLD AUTO: 0.01 X10(3) UL (ref 0–0.2)
BASOPHILS NFR BLD AUTO: 0.1 %
BUN BLD-MCNC: 33 MG/DL (ref 9–23)
BUN/CREAT SERPL: 30.3 (ref 10–20)
CALCIUM BLD-MCNC: 8.6 MG/DL (ref 8.7–10.4)
CHLORIDE SERPL-SCNC: 104 MMOL/L (ref 98–112)
CO2 SERPL-SCNC: 29 MMOL/L (ref 21–32)
CREAT BLD-MCNC: 1.09 MG/DL (ref 0.55–1.02)
DEPRECATED RDW RBC AUTO: 60.8 FL (ref 35.1–46.3)
EGFRCR SERPLBLD CKD-EPI 2021: 49 ML/MIN/1.73M2 (ref 60–?)
EOSINOPHIL # BLD AUTO: 0 X10(3) UL (ref 0–0.7)
EOSINOPHIL NFR BLD AUTO: 0 %
ERYTHROCYTE [DISTWIDTH] IN BLOOD BY AUTOMATED COUNT: 20 % (ref 11–15)
GLUCOSE BLD-MCNC: 145 MG/DL (ref 70–99)
GLUCOSE BLDC GLUCOMTR-MCNC: 167 MG/DL (ref 70–99)
GLUCOSE BLDC GLUCOMTR-MCNC: 174 MG/DL (ref 70–99)
GLUCOSE BLDC GLUCOMTR-MCNC: 215 MG/DL (ref 70–99)
GLUCOSE BLDC GLUCOMTR-MCNC: 389 MG/DL (ref 70–99)
GLUCOSE BLDC GLUCOMTR-MCNC: 61 MG/DL (ref 70–99)
GLUCOSE BLDC GLUCOMTR-MCNC: 72 MG/DL (ref 70–99)
HCT VFR BLD AUTO: 24 % (ref 35–48)
HCT VFR BLD AUTO: 25.6 % (ref 35–48)
HEMOCCULT STL QL: POSITIVE
HGB BLD-MCNC: 7.1 G/DL (ref 12–16)
HGB BLD-MCNC: 7.7 G/DL (ref 12–16)
IMM GRANULOCYTES # BLD AUTO: 0.07 X10(3) UL (ref 0–1)
IMM GRANULOCYTES NFR BLD: 0.6 %
LYMPHOCYTES # BLD AUTO: 1.08 X10(3) UL (ref 1–4)
LYMPHOCYTES NFR BLD AUTO: 8.8 %
MCH RBC QN AUTO: 25.5 PG (ref 26–34)
MCHC RBC AUTO-ENTMCNC: 29.6 G/DL (ref 31–37)
MCV RBC AUTO: 86.3 FL (ref 80–100)
MONOCYTES # BLD AUTO: 0.69 X10(3) UL (ref 0.1–1)
MONOCYTES NFR BLD AUTO: 5.6 %
NEUTROPHILS # BLD AUTO: 10.43 X10 (3) UL (ref 1.5–7.7)
NEUTROPHILS # BLD AUTO: 10.43 X10(3) UL (ref 1.5–7.7)
NEUTROPHILS NFR BLD AUTO: 84.9 %
OSMOLALITY SERPL CALC.SUM OF ELEC: 298 MOSM/KG (ref 275–295)
PLATELET # BLD AUTO: 219 10(3)UL (ref 150–450)
POTASSIUM SERPL-SCNC: 3.8 MMOL/L (ref 3.5–5.1)
RBC # BLD AUTO: 2.78 X10(6)UL (ref 3.8–5.3)
SODIUM SERPL-SCNC: 139 MMOL/L (ref 136–145)
WBC # BLD AUTO: 12.3 X10(3) UL (ref 4–11)

## 2025-05-02 PROCEDURE — 99233 SBSQ HOSP IP/OBS HIGH 50: CPT | Performed by: INTERNAL MEDICINE

## 2025-05-02 PROCEDURE — 99232 SBSQ HOSP IP/OBS MODERATE 35: CPT | Performed by: INTERNAL MEDICINE

## 2025-05-02 RX ORDER — INSULIN DEGLUDEC 100 U/ML
16 INJECTION, SOLUTION SUBCUTANEOUS DAILY
Status: DISCONTINUED | OUTPATIENT
Start: 2025-05-03 | End: 2025-05-05

## 2025-05-02 RX ORDER — ACETAMINOPHEN 500 MG
500 TABLET ORAL EVERY 4 HOURS PRN
Status: DISCONTINUED | OUTPATIENT
Start: 2025-05-02 | End: 2025-05-05

## 2025-05-02 NOTE — PROGRESS NOTES
[facility]  part of [facility]     Hospitalist Progress Note     Chester Bautista Patient Status:  Inpatient    1940 MRN [id number]   Location [facility] 5SW/SE Attending Pérez Aguilera MD   Hosp Day # 3 PCP Heike Sorto MD     Chief Complaint:   Chief Complaint   Patient presents with    Shortness Of Breath        Subjective:     Patient seen sitting in bed.  Daughter on telephone during exam/interview.  Patient denies acute events overnight.  Patient reports her shortness of breath is much improved today.  Denies further wheezing.  Upon further review, patient does report some intermittent dark stools.  Patient states she has a history of AVMs.  Otherwise denied gross signs of active bleeding.    Objective:      Vital signs:  Vitals:    25 1700 25 2036 25 0548 25 0900   BP: (!) 161/71 153/65 149/60 157/64   BP Location: Left arm Left arm Left arm Left arm   Pulse:  82 74    Resp: 18 16 16 16   Temp: 98 °F (36.7 °C) 98.6 °F (37 °C) 98.2 °F (36.8 °C) 98 °F (36.7 °C)   TempSrc: Oral Oral Oral Oral   SpO2: 96% 96% 94% 93%   Weight:           Intake/Output Summary (Last 24 hours) at 2025 1100  Last data filed at 2025 2040  Gross per 24 hour   Intake 470 ml   Output --   Net 470 ml           Physical Exam:    GENERAL:  Awake and alert, in no acute distress.  HEART:  Regular rhythm, regular rate  LUNGS:  Air entry was minimally decreased.  No increased work of breathing.   ABDOMEN: Soft and non-tender.    PSYCHIATRIC: Normal mood    Diagnostic Data:    Labs:    Recent Labs   Lab 25  0642 25  0633 25  0550   WBC 5.6 12.6* 12.3*   HGB 8.1* 8.0* 7.1*   MCV 86.0 85.0 86.3   .0 243.0 219.0       Recent Labs   Lab 25  1103 25  0640 25  0646 25  0550   * 237* 326* 145*   BUN 14 19 27* 33*   CREATSERUM 1.22* 1.14* 1.18* 1.09*   CA 8.2* 8.0* 8.8 8.6*   ALB 3.3  --   --   --     140  136 139   K 3.1* 4.2  4.2 4.2 3.8    106 102 104   CO2 26.0 24.0 25.0 29.0   ALKPHO 67  --   --   --    AST 30  --   --   --    ALT 9*  --   --   --    BILT 0.3  --   --   --    TP 6.3  --   --   --            Estimated Creatinine Clearance: 28 mL/min (A) (based on SCr of 1.09 mg/dL (H)).    No results for input(s): \"PTP\", \"INR\" in the last 168 hours.         COVID-19  Lab Results   Component Value Date    COVID19 Not Detected 04/29/2025    COVID19 Not Detected 01/17/2025    COVID19 Not Detected 02/02/2024       Pro-Calcitonin  No results for input(s): \"PCT\" in the last 168 hours.    Cardiac  Recent Labs   Lab 04/29/25  1103   PBNP 1,263*       Inflammatory Markers  No results for input(s): \"CRP\", \"ROSALIE\", \"LDH\", \"DDIMER\" in the last 168 hours.    Culture:  Hospital Encounter on 04/29/25   1. Blood Culture     Status: None (Preliminary result)    Collection Time: 04/29/25  1:01 PM    Specimen: Blood,peripheral   Result Value Ref Range    Blood Culture Result No Growth 2 Days N/A       XR CHEST AP PORTABLE  (CPT=71045)  Result Date: 4/29/2025  CONCLUSION:   Mixed patchy bilateral lung opacity mildly increased since the prior study which may relate to acute infection or edema superimposed on chronic changes.     Dictated by (CST): Gene Austin MD on 4/29/2025 at 11:56 AM     Finalized by (CST): Gene Austin MD on 4/29/2025 at 11:59 AM                    Medications: Scheduled Medications[1]    Assessment & Plan:        Acute hypoxic respiratory failure (HCC)  -Patient presenting with increased shortness of breath with cough  -Noted to be hypoxic requiring supplemental O2, weaning as able   -Patient with history of COPD, ILD   -Chest x-ray reviewed.  Noted mixed patchy bilateral lung opacities.  - Concern for possible acute COPD exacerbation   -Unable to rule out community acquired pneumonia  - Continue on steroids, transitioned to p.o. prednisone.   - Continue DuoNebs and scheduled inhalers.  -  Completing course of empiric antibiotics  - Symptom relief as able  - Pulmonology on consult, appreciate further recommendations    Acute on chronic anemia  - Patient noted to have persistent slow downtrend in hemoglobin  - Patient with history of AVMs  - Patient did note intermittent dark black stools.    - Hemoglobin 7.1 this a.m.  Recheck H&H this afternoon.  -Check fecal occult stool, if positive will recommend further evaluation by GI.    Acute Kidney Injury   -Improving   - Discontinue IV fluid.  - Avoiding Nephrotoxic agents  - Cont to monitor    Type 2 DM   -With hyperglycemia  -Likely worsened due to steroid use as above  -Improving with adjustments  - Continue Tresiba to 20 units daily with scheduled mealtime dosing of 7 units.  - Monitoring Blood glucose with POC checks  - SSI to cover hyperglycemia  - Hypoglycemia protocol  - Will monitor and adjust agents as needed.      History of CVA  - Continue home regimen      Plan of care discussed with patient at bedside and with daughter via telephone. Discussed management/test result(s) with Rn with pulmonology consultant.    Quality:  DVT Prophylaxis: Heparin  CODE status: Full  Estimated date of discharge: TBD  Discharge is dependent on: clinical stability    Pérez Aguilera MD          This note was prepared using Dragon Medical voice recognition dictation software. As a result errors may occur. When identified these errors have been corrected. While every attempt is made to correct errors during dictation discrepancies may still exist         [1]    predniSONE  40 mg Oral Daily with breakfast    insulin degludec  20 Units Subcutaneous Daily    insulin aspart  7 Units Subcutaneous TID CC    magnesium oxide  400 mg Oral Once    cefTRIAXone  2 g Intravenous Q24H    heparin  5,000 Units Subcutaneous 2 times per day    aspirin  81 mg Oral Daily    carvedilol  25 mg Oral BID with meals    gabapentin  100 mg Oral Nightly    losartan  25 mg Oral Daily     magnesium oxide  400 mg Oral BID    montelukast  10 mg Oral Nightly    pantoprazole  40 mg Oral BID AC    rOPINIRole  0.25 mg Oral Nightly    rosuvastatin  10 mg Oral Nightly    insulin aspart  1-7 Units Subcutaneous TID CC and HS    fluticasone-salmeterol  1 puff Inhalation BID

## 2025-05-02 NOTE — PROGRESS NOTES
[facility]  part of [facility]     Progress Note    Chester Bautista Patient Status:  Inpatient    1940 MRN [id number]   Location [facility] 5SW/SE Attending Pérez Aguilera MD   Hosp Day # 3 PCP Heike Sorto MD       Subjective:   Patient seen and examined.  Dyspnea and cough improving.    Objective:   Blood pressure 149/60, pulse 74, temperature 98.2 °F (36.8 °C), temperature source Oral, resp. rate 16, weight 139 lb 12.8 oz (63.4 kg), SpO2 94%.  Intake/Output:   Last 3 shifts: I/O last 3 completed shifts:  In: 1190 [P.O.:1080; I.V.:10; IV PIGGYBACK:100]  Out: -    This shift: No intake/output data recorded.     Vent Settings:      Hemodynamic parameters (last 24 hours):      Scheduled Meds: Current Hospital Medications[1]    Continuous Infusions: Medication Infusions[2]    Physical Exam  Constitutional: no acute distress  Eyes: PERRL  ENT: nares pateint  Neck: supple, no JVD  Cardio: RRR, S1 S2  Respiratory: Expiratory Rales present  GI: abdomen soft, non tender, active bowel sounds, no organomegaly  Extremities: no clubbing, cyanosis, edema  Neurologic: no gross motor deficits  Skin: warm, dry      Results:     Lab Results   Component Value Date    WBC 12.3 2025    HGB 7.1 2025    HCT 24.0 2025    .0 2025    CREATSERUM 1.09 2025    BUN 33 2025     2025    K 3.8 2025     2025    CO2 29.0 2025     2025    CA 8.6 2025       No results found.              Assessment   1.  COPD with acute exacerbation  2.  CPFE  3.  Possible pneumonia  4.  Acute kidney injury on chronic kidney disease  5.  Anemia     Plan   -Patient presents with evidence of worsening dyspnea over the last several days.  Concern for COPD exacerbation  - Chest x-ray with some patchy bilateral opacities seen cannot rule out underlying pneumonia.  - Continue empiric antibiotic therapy at this time  -  Blood cultures thus far with no growth to date  - Gradually wean steroid therapy  - ICS/LABA  - Scheduled nebulizer treatments  - DVT prophylaxis: Heparin  - Okay for discharge from pulmonary perspective.  Complete total 5-day course of antibiotic therapy.  Commend tapering prednisone over the course of next week.  Follow-up in pulmonary clinic in 2 weeks time with Dr. Elena Williamson,   Pulmonary Critical Care Medicine  [facility]          [1]   Current Facility-Administered Medications   Medication Dose Route Frequency    predniSONE (Deltasone) tab 40 mg  40 mg Oral Daily with breakfast    insulin degludec (Tresiba) 100 units/mL flextouch 20 Units  20 Units Subcutaneous Daily    insulin aspart (NovoLOG) 100 Units/mL FlexPen 7 Units  7 Units Subcutaneous TID CC    magnesium oxide (Mag-Ox) tab 400 mg  400 mg Oral Once    cefTRIAXone (Rocephin) 2 g in sodium chloride 0.9% 100 mL IVPB-ADDV  2 g Intravenous Q24H    ipratropium-albuterol (Duoneb) 0.5-2.5 (3) MG/3ML inhalation solution 3 mL  3 mL Nebulization Q4H PRN    glucose (Dex4) 15 GM/59ML oral liquid 15 g  15 g Oral Q15 Min PRN    Or    glucose (Glutose) 40% oral gel 15 g  15 g Oral Q15 Min PRN    Or    glucose-vitamin C (Dex-4) chewable tab 4 tablet  4 tablet Oral Q15 Min PRN    Or    dextrose 50% injection 50 mL  50 mL Intravenous Q15 Min PRN    Or    glucose (Dex4) 15 GM/59ML oral liquid 30 g  30 g Oral Q15 Min PRN    Or    glucose (Glutose) 40% oral gel 30 g  30 g Oral Q15 Min PRN    Or    glucose-vitamin C (Dex-4) chewable tab 8 tablet  8 tablet Oral Q15 Min PRN    heparin (Porcine) 5000 UNIT/ML injection 5,000 Units  5,000 Units Subcutaneous 2 times per day    acetaminophen (Tylenol Extra Strength) tab 500 mg  500 mg Oral Q4H PRN    ondansetron (Zofran) 4 MG/2ML injection 4 mg  4 mg Intravenous Q6H PRN    metoclopramide (Reglan) 5 mg/mL injection 5 mg  5 mg Intravenous Q8H PRN    guaiFENesin (Robitussin) 100 MG/5 ML oral liquid 200 mg  200 mg  Oral Q4H PRN    benzonatate (Tessalon) cap 200 mg  200 mg Oral TID PRN    temazepam (Restoril) cap 15 mg  15 mg Oral Nightly PRN    aspirin DR tab 81 mg  81 mg Oral Daily    carvedilol (Coreg) tab 25 mg  25 mg Oral BID with meals    gabapentin (Neurontin) cap 100 mg  100 mg Oral Nightly    losartan (Cozaar) tab 25 mg  25 mg Oral Daily    magnesium oxide (Mag-Ox) tab 400 mg  400 mg Oral BID    montelukast (Singulair) tab 10 mg  10 mg Oral Nightly    pantoprazole (Protonix) DR tab 40 mg  40 mg Oral BID AC    rOPINIRole (Requip) tab 0.25 mg  0.25 mg Oral Nightly    rosuvastatin (Crestor) tab 10 mg  10 mg Oral Nightly    sennosides (Senokot) tab 8.6 mg  8.6 mg Oral BID PRN    insulin aspart (NovoLOG) 100 Units/mL FlexPen 1-7 Units  1-7 Units Subcutaneous TID CC and HS    fluticasone-salmeterol (Advair Diskus) 250-50 MCG/ACT inhaler 1 puff  1 puff Inhalation BID   [2]

## 2025-05-03 PROBLEM — D64.9 ACUTE ON CHRONIC ANEMIA: Status: ACTIVE | Noted: 2025-05-03

## 2025-05-03 LAB
ANION GAP SERPL CALC-SCNC: 7 MMOL/L (ref 0–18)
ANTIBODY SCREEN: NEGATIVE
BASOPHILS # BLD AUTO: 0.01 X10(3) UL (ref 0–0.2)
BASOPHILS NFR BLD AUTO: 0.1 %
BUN BLD-MCNC: 30 MG/DL (ref 9–23)
BUN/CREAT SERPL: 31.9 (ref 10–20)
CALCIUM BLD-MCNC: 8.6 MG/DL (ref 8.7–10.4)
CHLORIDE SERPL-SCNC: 106 MMOL/L (ref 98–112)
CO2 SERPL-SCNC: 29 MMOL/L (ref 21–32)
CREAT BLD-MCNC: 0.94 MG/DL (ref 0.55–1.02)
DEPRECATED RDW RBC AUTO: 60.8 FL (ref 35.1–46.3)
EGFRCR SERPLBLD CKD-EPI 2021: 59 ML/MIN/1.73M2 (ref 60–?)
EOSINOPHIL # BLD AUTO: 0 X10(3) UL (ref 0–0.7)
EOSINOPHIL NFR BLD AUTO: 0 %
ERYTHROCYTE [DISTWIDTH] IN BLOOD BY AUTOMATED COUNT: 20 % (ref 11–15)
GLUCOSE BLD-MCNC: 155 MG/DL (ref 70–99)
GLUCOSE BLDC GLUCOMTR-MCNC: 118 MG/DL (ref 70–99)
GLUCOSE BLDC GLUCOMTR-MCNC: 193 MG/DL (ref 70–99)
GLUCOSE BLDC GLUCOMTR-MCNC: 230 MG/DL (ref 70–99)
GLUCOSE BLDC GLUCOMTR-MCNC: 335 MG/DL (ref 70–99)
HCT VFR BLD AUTO: 23 % (ref 35–48)
HCT VFR BLD AUTO: 28.8 % (ref 35–48)
HGB BLD-MCNC: 6.6 G/DL (ref 12–16)
HGB BLD-MCNC: 8.7 G/DL (ref 12–16)
IMM GRANULOCYTES # BLD AUTO: 0.07 X10(3) UL (ref 0–1)
IMM GRANULOCYTES NFR BLD: 0.7 %
LYMPHOCYTES # BLD AUTO: 1.06 X10(3) UL (ref 1–4)
LYMPHOCYTES NFR BLD AUTO: 10.6 %
MCH RBC QN AUTO: 24.4 PG (ref 26–34)
MCHC RBC AUTO-ENTMCNC: 28.7 G/DL (ref 31–37)
MCV RBC AUTO: 85.2 FL (ref 80–100)
MONOCYTES # BLD AUTO: 0.8 X10(3) UL (ref 0.1–1)
MONOCYTES NFR BLD AUTO: 8 %
NEUTROPHILS # BLD AUTO: 8.05 X10 (3) UL (ref 1.5–7.7)
NEUTROPHILS # BLD AUTO: 8.05 X10(3) UL (ref 1.5–7.7)
NEUTROPHILS NFR BLD AUTO: 80.6 %
OSMOLALITY SERPL CALC.SUM OF ELEC: 303 MOSM/KG (ref 275–295)
PLATELET # BLD AUTO: 221 10(3)UL (ref 150–450)
POTASSIUM SERPL-SCNC: 3.7 MMOL/L (ref 3.5–5.1)
RBC # BLD AUTO: 2.7 X10(6)UL (ref 3.8–5.3)
RH BLOOD TYPE: POSITIVE
SODIUM SERPL-SCNC: 142 MMOL/L (ref 136–145)
WBC # BLD AUTO: 10 X10(3) UL (ref 4–11)

## 2025-05-03 PROCEDURE — 99222 1ST HOSP IP/OBS MODERATE 55: CPT | Performed by: INTERNAL MEDICINE

## 2025-05-03 PROCEDURE — 99232 SBSQ HOSP IP/OBS MODERATE 35: CPT | Performed by: INTERNAL MEDICINE

## 2025-05-03 PROCEDURE — 30233N1 TRANSFUSION OF NONAUTOLOGOUS RED BLOOD CELLS INTO PERIPHERAL VEIN, PERCUTANEOUS APPROACH: ICD-10-PCS | Performed by: INTERNAL MEDICINE

## 2025-05-03 PROCEDURE — 99233 SBSQ HOSP IP/OBS HIGH 50: CPT | Performed by: INTERNAL MEDICINE

## 2025-05-03 RX ORDER — LORAZEPAM 2 MG/ML
INJECTION INTRAMUSCULAR
Status: COMPLETED
Start: 2025-05-03 | End: 2025-05-03

## 2025-05-03 RX ORDER — ALPRAZOLAM 0.25 MG
0.25 TABLET ORAL 2 TIMES DAILY PRN
Status: DISCONTINUED | OUTPATIENT
Start: 2025-05-03 | End: 2025-05-05

## 2025-05-03 RX ORDER — HYDRALAZINE HYDROCHLORIDE 20 MG/ML
10 INJECTION INTRAMUSCULAR; INTRAVENOUS EVERY 4 HOURS PRN
Status: DISCONTINUED | OUTPATIENT
Start: 2025-05-03 | End: 2025-05-05

## 2025-05-03 RX ORDER — SODIUM CHLORIDE 9 MG/ML
INJECTION, SOLUTION INTRAVENOUS ONCE
Status: COMPLETED | OUTPATIENT
Start: 2025-05-03 | End: 2025-05-03

## 2025-05-03 RX ORDER — LORAZEPAM 2 MG/ML
1 INJECTION INTRAMUSCULAR ONCE
Status: COMPLETED | OUTPATIENT
Start: 2025-05-03 | End: 2025-05-03

## 2025-05-03 RX ORDER — LOSARTAN POTASSIUM 25 MG/1
25 TABLET ORAL ONCE
Status: COMPLETED | OUTPATIENT
Start: 2025-05-03 | End: 2025-05-03

## 2025-05-03 RX ORDER — PREDNISONE 20 MG/1
20 TABLET ORAL
Status: DISCONTINUED | OUTPATIENT
Start: 2025-05-04 | End: 2025-05-05

## 2025-05-03 RX ORDER — LOSARTAN POTASSIUM 50 MG/1
50 TABLET ORAL DAILY
Status: DISCONTINUED | OUTPATIENT
Start: 2025-05-04 | End: 2025-05-04

## 2025-05-03 RX ORDER — OCTREOTIDE ACETATE 50 UG/ML
50 INJECTION, SOLUTION INTRAVENOUS; SUBCUTANEOUS EVERY 8 HOURS
Status: DISCONTINUED | OUTPATIENT
Start: 2025-05-03 | End: 2025-05-05

## 2025-05-03 NOTE — PROGRESS NOTES
[facility]  part of [facility]     Hospitalist Progress Note     Chester Bautista Patient Status:  Inpatient    1940 MRN [id number]   Location [facility] 5SW/SE Attending Pérez Aguilera MD   Hosp Day # 4 PCP Heike Sorto MD     Chief Complaint:   Chief Complaint   Patient presents with    Shortness Of Breath        Subjective:     Patient seen sitting in bed.  No family at bedside.  Patient denies acute events overnight.  Patient states she had additional episodes of dark black stool yesterday.  Patient denies further bowel movements today.  Patient states that shortness of breath much improved.  Otherwise denied gross signs of active bleeding.    Objective:      Vital signs:  Vitals:    25 0527 25 0823 25 0910   BP: 147/59 149/55 (!) 176/72 (!) 175/68   BP Location: Left arm Left arm Left arm Left arm   Pulse: 82 73 84 72   Resp: 16 16 18 18   Temp: 98.5 °F (36.9 °C) 98.6 °F (37 °C) 98.2 °F (36.8 °C) 97.8 °F (36.6 °C)   TempSrc: Oral Oral Oral Oral   SpO2: 96% 92% 93% 96%   Weight:           Intake/Output Summary (Last 24 hours) at 5/3/2025 1006  Last data filed at 5/3/2025 0600  Gross per 24 hour   Intake 992 ml   Output --   Net 992 ml           Physical Exam:    GENERAL:  Awake and alert, in no acute distress.  HEART:  Regular rhythm, regular rate  LUNGS:  Air entry was minimally decreased.  No increased work of breathing.   ABDOMEN: Soft and non-tender.    PSYCHIATRIC: Normal mood    Diagnostic Data:    Labs:    Recent Labs   Lab 25  0633 25  0550 25  1204 25  0530   WBC 12.6* 12.3*  --  10.0   HGB 8.0* 7.1* 7.7* 6.6*   MCV 85.0 86.3  --  85.2   .0 219.0  --  221.0       Recent Labs   Lab 25  1103 25  0640 25  0646 25  0550 25  0530   *   < > 326* 145* 155*   BUN 14   < > 27* 33* 30*   CREATSERUM 1.22*   < > 1.18* 1.09* 0.94   CA 8.2*   < > 8.8 8.6* 8.6*   ALB  3.3  --   --   --   --       < > 136 139 142   K 3.1*   < > 4.2 3.8 3.7      < > 102 104 106   CO2 26.0   < > 25.0 29.0 29.0   ALKPHO 67  --   --   --   --    AST 30  --   --   --   --    ALT 9*  --   --   --   --    BILT 0.3  --   --   --   --    TP 6.3  --   --   --   --     < > = values in this interval not displayed.           Estimated Creatinine Clearance: 32.4 mL/min (based on SCr of 0.94 mg/dL).    No results for input(s): \"PTP\", \"INR\" in the last 168 hours.         COVID-19  Lab Results   Component Value Date    COVID19 Not Detected 04/29/2025    COVID19 Not Detected 01/17/2025    COVID19 Not Detected 02/02/2024       Pro-Calcitonin  No results for input(s): \"PCT\" in the last 168 hours.    Cardiac  Recent Labs   Lab 04/29/25  1103   PBNP 1,263*       Inflammatory Markers  No results for input(s): \"CRP\", \"ROSALIE\", \"LDH\", \"DDIMER\" in the last 168 hours.    Culture:  Hospital Encounter on 04/29/25   1. Blood Culture     Status: None (Preliminary result)    Collection Time: 04/29/25  1:01 PM    Specimen: Blood,peripheral   Result Value Ref Range    Blood Culture Result No Growth 3 Days N/A       No results found.              Medications: Scheduled Medications[1]    Assessment & Plan:        Acute hypoxic respiratory failure (HCC)  -Patient presenting with increased shortness of breath with cough  -Noted to be hypoxic requiring supplemental O2, weaning as able   -Patient with history of COPD, ILD   -Chest x-ray reviewed.  Noted mixed patchy bilateral lung opacities.  - Concern for possible acute COPD exacerbation   -Unable to rule out community acquired pneumonia  - Continue on steroids, transitioned to p.o. prednisone.   - Continue DuoNebs and scheduled inhalers.  - Completing course of empiric antibiotics  - Symptom relief as able  - Pulmonology on consult, appreciate further recommendations    Acute on chronic anemia  Possible GI bleed  - Patient noted to have persistent slow downtrend in  hemoglobin  - Patient with history of AVMs  - Patient did note intermittent dark black stools.    - Hemoglobin 6.6 this a.m. transfusing 1 unit PRBC  -fecal occult stool heme positive   - GI consulted, appreciate further recommendations.      Acute Kidney Injury   -Improving   - Discontinue IV fluid.  - Avoiding Nephrotoxic agents  - Cont to monitor    Type 2 DM   -With hyperglycemia  -Likely worsened due to steroid use as above  -Improving with adjustments  -Had episode of hypoglycemia on 5/2.  - Decreased dosing of Tresiba to 16 units daily with scheduled mealtime dosing of 5 units.  - Monitoring Blood glucose with POC checks  - SSI to cover hyperglycemia  - Hypoglycemia protocol  - Will monitor and adjust agents as needed.      History of CVA  - Continue home regimen      Addendum: Discussed with GI consultant.  Agreed with transfusion.  Started patient on octreotide.    Plan of care discussed with patient at bedside . Discussed management/test result(s) with Rn with GI consultant.    Quality:  DVT Prophylaxis: Heparin  CODE status: Full  Estimated date of discharge: TBD  Discharge is dependent on: clinical stability    Pérez Aguilera MD          This note was prepared using Dragon Medical voice recognition dictation software. As a result errors may occur. When identified these errors have been corrected. While every attempt is made to correct errors during dictation discrepancies may still exist         [1]    insulin aspart  5 Units Subcutaneous TID CC    insulin degludec  16 Units Subcutaneous Daily    predniSONE  40 mg Oral Daily with breakfast    magnesium oxide  400 mg Oral Once    cefTRIAXone  2 g Intravenous Q24H    aspirin  81 mg Oral Daily    carvedilol  25 mg Oral BID with meals    gabapentin  100 mg Oral Nightly    losartan  25 mg Oral Daily    magnesium oxide  400 mg Oral BID    montelukast  10 mg Oral Nightly    pantoprazole  40 mg Oral BID AC    rOPINIRole  0.25 mg Oral Nightly    rosuvastatin   10 mg Oral Nightly    insulin aspart  1-7 Units Subcutaneous TID CC and HS    fluticasone-salmeterol  1 puff Inhalation BID

## 2025-05-03 NOTE — PLAN OF CARE
Patient started on blood transfusion and tolerating it well. Vital signs will be monitored per protocol.

## 2025-05-03 NOTE — PLAN OF CARE
Problem: Patient Centered Care  Goal: Patient preferences are identified and integrated in the patient's plan of care  Description: Interventions:- What would you like us to know as we care for you? - Provide timely, complete, and accurate information to patient/family- Incorporate patient and family knowledge, values, beliefs, and cultural backgrounds into the planning and delivery of care- Encourage patient/family to participate in care and decision-making at the level they choose- Honor patient and family perspectives and choices  Outcome: Progressing     Problem: PAIN - ADULT  Goal: Verbalizes/displays adequate comfort level or patient's stated pain goal  Description: INTERVENTIONS:- Encourage pt to monitor pain and request assistance- Assess pain using appropriate pain scale- Administer analgesics based on type and severity of pain and evaluate response- Implement non-pharmacological measures as appropriate and evaluate response- Consider cultural and social influences on pain and pain management- Manage/alleviate anxiety- Utilize distraction and/or relaxation techniques- Monitor for opioid side effects- Notify MD/LIP if interventions unsuccessful or patient reports new pain- Anticipate increased pain with activity and pre-medicate as appropriate  Outcome: Progressing     Problem: RESPIRATORY - ADULT  Goal: Achieves optimal ventilation and oxygenation  Description: INTERVENTIONS:- Assess for changes in respiratory status- Assess for changes in mentation and behavior- Position to facilitate oxygenation and minimize respiratory effort- Oxygen supplementation based on oxygen saturation or ABGs- Provide Smoking Cessation handout, if applicable- Encourage broncho-pulmonary hygiene including cough, deep breathe, Incentive Spirometry- Assess the need for suctioning and perform as needed- Assess and instruct to report SOB or any respiratory difficulty- Respiratory Therapy support as indicated- Manage/alleviate  anxiety- Monitor for signs/symptoms of CO2 retention  Outcome: Progressing     Problem: DISCHARGE PLANNING  Goal: Discharge to home or other facility with appropriate resources  Description: INTERVENTIONS:- Identify barriers to discharge w/pt and caregiver- Include patient/family/discharge partner in discharge planning- Arrange for needed discharge resources and transportation as appropriate- Identify discharge learning needs (meds, wound care, etc)- Arrange for interpreters to assist at discharge as needed- Consider post-discharge preferences of patient/family/discharge partner- Complete POLST form as appropriate- Assess patient's ability to be responsible for managing their own health- Refer to Case Management Department for coordinating discharge planning if the patient needs post-hospital services based on physician/LIP order or complex needs related to functional status, cognitive ability or social support system  Outcome: Progressing     Problem: HEMATOLOGIC - ADULT  Goal: Maintains hematologic stability  Description: INTERVENTIONS- Assess for signs and symptoms of bleeding or hemorrhage- Monitor labs and vital signs for trends- Administer supportive blood products/factors, fluids and medications as ordered and appropriate- Administer supportive blood products/factors as ordered and appropriate  Outcome: Progressing     Problem: METABOLIC/FLUID AND ELECTROLYTES - ADULT  Goal: Glucose maintained within prescribed range  Description: INTERVENTIONS:- Monitor Blood Glucose as ordered- Assess for signs and symptoms of hyperglycemia and hypoglycemia- Administer ordered medications to maintain glucose within target range- Assess barriers to adequate nutritional intake and initiate nutrition consult as needed- Instruct patient on self management of diabetes  Outcome: Progressing     Patient is presently resting in the bed. Alert x 4. Vital signs taken and monitored. H&H this am was 6.6/23.0. Patient currently  receiving 1 unit of packed red blood cells and tolerating well. Post transfusion H&H will be done per protocol. Fall risk precautions are followed at all times. No shortness of breath or difficulty breathing. Patient is currently on room air. Patient was seen by Dr. Parker, Pulmonary doctor on this morning. Call light within reach at all times.

## 2025-05-03 NOTE — PLAN OF CARE

## 2025-05-03 NOTE — PROGRESS NOTES
[facility]  part of [facility]    Progress Note      Assessment and Plan:   1.  Acute on chronic respiratory failure-COPD, possible pneumonitis with chronic severe anemia.  Patient looks good at present and is off oxygen.  She has combined pulmonary fibrosis and emphysema.  She is breathing comfortably and off antibiotics.  Getting another transfusion today.    Recommendations:  1.  Discharge planning  2.  Ongoing management of anemia by hematology Dr. Mohamud.  3.  Packed red blood cells.  4.  MDI and nebulizer therapy  5.  Decrease prednisone to 20 mg.    2.  DVT prophylaxis-subcutaneous heparin    3.  Multiple strokes with history of aortic arch thrombus status post stenting    4.  AVMs with history of GI bleed    5.  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    Subjective:   Chester Bautista is a(n) 85 year old female who is breathing more comfortably    Objective:   Blood pressure (!) 163/71, pulse 75, temperature 97.7 °F (36.5 °C), temperature source Oral, resp. rate 18, weight 139 lb 12.8 oz (63.4 kg), SpO2 96%.    Physical Exam alert black female  HEENT examination is unremarkable with pupils equal round and reactive to light and accommodation.   Neck without adenopathy, thyromegaly, JVD nor bruit.   Lungs diminished to auscultation and percussion.  Cardiac regular rate and rhythm no murmur.   Abdomen nontender, without hepatosplenomegaly and no mass appreciable.   Extremities without clubbing cyanosis nor edema.   Neurologic grossly intact with symmetric tone and strength and reflex.  Skin without gross abnormality     Results:     Lab Results   Component Value Date    WBC 10.0 05/03/2025    HGB 6.6 05/03/2025    HCT 23.0 05/03/2025    .0 05/03/2025    CREATSERUM 0.94 05/03/2025    BUN 30 05/03/2025     05/03/2025    K 3.7 05/03/2025     05/03/2025    CO2 29.0 05/03/2025     05/03/2025    CA 8.6 05/03/2025       Chapin Parker MD  Medical  Director, Critical Care, [facility]  Medical Director, [facility]  Pager: [phone]

## 2025-05-03 NOTE — CONSULTS
[facility]   Gastroenterology Consultation Note     Chester Bautista  Patient Status:    Inpatient  Date of Admission:         4/29/2025, Hospital day #4  Attending:   Pérez Aguilera MD  PCP:     Heike Sorto MD    Reason for Consultation:  Anemia, history of gastrointestinal bleeding from avms    History of Present Illness:  Chester Bautista is a 85 year old woman with history of chronic kidney disease, diabetes, sarcoidosis, dyslipidemia, hypertension, infiltrating ductal carcinoma of the breast, rheumatoid arthritis, osteoarthrosis, peripheral vascular disease, multiple listed eye conditions, COPD, stroke who was admitted 4/29/2025 after presenting to the emergency department with low oxygen saturation being treated for acute on chronic respiratory failure from COPD    Patient says her stools have been dark at times similar to what they have been over a long time and not necessarily changed recently.  She denies black stool or blood in the stool.  She denies having any bowel movements a day.  Not having any abdominal pain.    History:  Past Medical History[1]  Past Surgical History[2]  Family History[3]   reports that she quit smoking about 28 years ago. Her smoking use included cigarettes. She has been exposed to tobacco smoke. She has never used smokeless tobacco. She reports that she does not drink alcohol and does not use drugs.    Allergies:  Allergies[4]    Medications:  Current Hospital Medications[5]    Review of Systems:  Systems were reviewed and were negative except as noted in the HPI    Physical Exam:    Blood pressure 148/62, pulse 77, temperature 98.3 °F (36.8 °C), temperature source Oral, resp. rate 18, weight 139 lb 12.8 oz (63.4 kg), SpO2 95%. Body mass index is 26.41 kg/m².    General:awake, cooperative, no acute distress  HEENT: EOMI, no scleral icterus, MMM; oral pharnyx is without exudates or lesions  Neck: no lymphadenopathy; thyroid is not  enlarged and without nodules  CV: RRR  Resp: non-labored breathing  Abd: soft, non-tender, non-distended  Ext: no lower extremity swelling  Neuro: Alert, Oriented X 3  Skin: no rashes, bruises  Psych: normal affect    Laboratory Data:  Lab Results   Component Value Date    WBC 10.0 05/03/2025    HGB 6.6 05/03/2025    HCT 23.0 05/03/2025    .0 05/03/2025    CREATSERUM 0.94 05/03/2025    BUN 30 05/03/2025     05/03/2025    K 3.7 05/03/2025     05/03/2025    CO2 29.0 05/03/2025     05/03/2025    CA 8.6 05/03/2025     Imaging:  No results found.    Assessment & Plan   Chester Bautista is a 85 year old woman with history of chronic kidney disease, diabetes, sarcoidosis, dyslipidemia, hypertension, infiltrating ductal carcinoma of the breast, rheumatoid arthritis, osteoarthrosis, peripheral vascular disease, multiple listed eye conditions, COPD, stroke who was admitted 4/29/2025 after presenting to the emergency department with low oxygen saturation being treated for acute on chronic respiratory failure from COPD    Gastrointestinal consult service was asked to see the patient today regarding acute on chronic anemia in the setting of a history of thought to be 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  Review of the chart notes multiple gastrointestinal procedures in the last approximately 3 years for similar problems.  Appears she was most recently seen by the gastrointestinal service January 2025 with similar issues during the hospitalization for a CVA.  Appears there were many discussions regarding her chronic condition and the challenges that are posed by it from medical and endoscopic management with non-endoscopic management at that time.  We again reviewed this today and unclear at this point that aggressively pursuing endoscopic evaluation has benefits that outweigh the risks and the potential low yield for endoscopic therapy in the setting. We discussed at  this time time thus recommended medical management (blood transfusion, monitoring blood and iron counts, octreotide) and certainly consideration of endoscopic/colonoscopic evaluation pending clinical course.    Recommend:  -blood transfusion  -monitor blood counts and GI output  -octreotide    Discussed with hospitalist    MD Terrell [facility]  [facility] - Gastroenterology  5/3/2025         [1]   Past Medical History:   Anxiety state    Cancer (HCC)    breast cancer 2018    Cerebellar stroke (HCC)    Chronic obstructive pulmonary disease, unspecified (HCC)    Chronic obstructive pulmonary disease, unspecified (HCC)    COPD (chronic obstructive pulmonary disease) (HCC)    Cystic thyroid nodule    Diabetes (HCC)    Diabetes mellitus (HCC)    Essential hypertension    Exposure to medical diagnostic radiation    High blood pressure    High cholesterol    History of blood transfusion    low hemoglobin    Osteoarthritis    PONV (postoperative nausea and vomiting)    Pulmonary embolism (HCC)    Pulmonary emphysema (HCC)    Rheumatoid arthritis (HCC)    Stroke (HCC)   [2]   Past Surgical History:  Procedure Laterality Date    Cataract extraction w/  intraocular lens implant Bilateral 2017    Dr in [facility]     Colonoscopy      Colonoscopy N/A 07/21/2022    Procedure: COLONOSCOPY;  Surgeon: Mikey Garcia MD;  Location: [facility] ENDOSCOPY    Colonoscopy N/A 06/15/2023    Procedure: COLONOSCOPY;  Surgeon: Mikey Garcia MD;  Location: [facility] ENDOSCOPY    Correct bunion,simple      right foot  1993    Egd  12/21/2023    Dr. Garcia; Duodenal AVM's x4 cauterized    Hysterectomy      1972, no abnormal Paps, due to heavy bleeding with fibroids.  Not sure if it had bilateral salpingo-oophorectomy.    Ir aneurysm repairm  2010    Computer assisted volumetric craniofomy with clipping of anterior cerebral artery.    Lumpectomy right      Radiation right      Rotator cuff repair      1993    Total  knee replacement Right 2010    Total knee replacement Left 07/02/2015    Transoral incisionless fundoplication - internal  01/25/2012 Fredy fundoplication at [facility] Dr. Fournier.    Fredy fundoplication at [facility] Dr. Fournier.    Trigger finger release      left hand  2005    Yag capsulotomy - ou - both eyes Bilateral 09/2021    done in [place]   [3]   Family History  Problem Relation Age of Onset    Heart Surgery Mother         Leaky valve at 39 y/o.     Prostate Cancer Brother     Cancer Brother     Diabetes Maternal Grandmother     Diabetes Paternal Aunt     Breast Cancer Self 78    Breast Cancer Niece         before 50    Macular degeneration Neg     Glaucoma Neg    [4]   Allergies  Allergen Reactions    Celebrex [Celecoxib] PALPITATIONS    Penicillins ITCHING and SWELLING    Amlodipine OTHER (SEE COMMENTS)     Calves Swelling     Metformin And Related UNKNOWN    Olmesartan UNKNOWN   [5]   Current Facility-Administered Medications:     [START ON 5/4/2025] predniSONE (Deltasone) tab 20 mg, 20 mg, Oral, Daily with breakfast    octreoTIDe (SandoSTATIN) 50 mcg/mL injection 50 mcg, 50 mcg, Subcutaneous, Q8H    insulin aspart (NovoLOG) 100 Units/mL FlexPen 5 Units, 5 Units, Subcutaneous, TID CC    insulin degludec (Tresiba) 100 units/mL flextouch 16 Units, 16 Units, Subcutaneous, Daily    acetaminophen (Tylenol Extra Strength) tab 500 mg, 500 mg, Oral, Q4H PRN    magnesium oxide (Mag-Ox) tab 400 mg, 400 mg, Oral, Once    cefTRIAXone (Rocephin) 2 g in sodium chloride 0.9% 100 mL IVPB-ADDV, 2 g, Intravenous, Q24H    ipratropium-albuterol (Duoneb) 0.5-2.5 (3) MG/3ML inhalation solution 3 mL, 3 mL, Nebulization, Q4H PRN    glucose (Dex4) 15 GM/59ML oral liquid 15 g, 15 g, Oral, Q15 Min PRN **OR** glucose (Glutose) 40% oral gel 15 g, 15 g, Oral, Q15 Min PRN **OR** glucose-vitamin C (Dex-4) chewable tab 4 tablet, 4 tablet, Oral, Q15 Min PRN **OR** dextrose 50% injection 50 mL, 50 mL,  Intravenous, Q15 Min PRN **OR** glucose (Dex4) 15 GM/59ML oral liquid 30 g, 30 g, Oral, Q15 Min PRN **OR** glucose (Glutose) 40% oral gel 30 g, 30 g, Oral, Q15 Min PRN **OR** glucose-vitamin C (Dex-4) chewable tab 8 tablet, 8 tablet, Oral, Q15 Min PRN    ondansetron (Zofran) 4 MG/2ML injection 4 mg, 4 mg, Intravenous, Q6H PRN    metoclopramide (Reglan) 5 mg/mL injection 5 mg, 5 mg, Intravenous, Q8H PRN    guaiFENesin (Robitussin) 100 MG/5 ML oral liquid 200 mg, 200 mg, Oral, Q4H PRN    benzonatate (Tessalon) cap 200 mg, 200 mg, Oral, TID PRN    temazepam (Restoril) cap 15 mg, 15 mg, Oral, Nightly PRN    aspirin DR tab 81 mg, 81 mg, Oral, Daily    carvedilol (Coreg) tab 25 mg, 25 mg, Oral, BID with meals    gabapentin (Neurontin) cap 100 mg, 100 mg, Oral, Nightly    losartan (Cozaar) tab 25 mg, 25 mg, Oral, Daily    magnesium oxide (Mag-Ox) tab 400 mg, 400 mg, Oral, BID    montelukast (Singulair) tab 10 mg, 10 mg, Oral, Nightly    pantoprazole (Protonix) DR tab 40 mg, 40 mg, Oral, BID AC    rOPINIRole (Requip) tab 0.25 mg, 0.25 mg, Oral, Nightly    rosuvastatin (Crestor) tab 10 mg, 10 mg, Oral, Nightly    sennosides (Senokot) tab 8.6 mg, 8.6 mg, Oral, BID PRN    insulin aspart (NovoLOG) 100 Units/mL FlexPen 1-7 Units, 1-7 Units, Subcutaneous, TID CC and HS    fluticasone-salmeterol (Advair Diskus) 250-50 MCG/ACT inhaler 1 puff, 1 puff, Inhalation, BID

## 2025-05-04 LAB
ANION GAP SERPL CALC-SCNC: 7 MMOL/L (ref 0–18)
BASOPHILS # BLD AUTO: 0.01 X10(3) UL (ref 0–0.2)
BASOPHILS NFR BLD AUTO: 0.1 %
BLOOD TYPE BARCODE: 1700
BUN BLD-MCNC: 23 MG/DL (ref 9–23)
BUN/CREAT SERPL: 26.1 (ref 10–20)
CALCIUM BLD-MCNC: 8.6 MG/DL (ref 8.7–10.4)
CHLORIDE SERPL-SCNC: 105 MMOL/L (ref 98–112)
CO2 SERPL-SCNC: 28 MMOL/L (ref 21–32)
CREAT BLD-MCNC: 0.88 MG/DL (ref 0.55–1.02)
DEPRECATED RDW RBC AUTO: 58.4 FL (ref 35.1–46.3)
EGFRCR SERPLBLD CKD-EPI 2021: 64 ML/MIN/1.73M2 (ref 60–?)
EOSINOPHIL # BLD AUTO: 0.03 X10(3) UL (ref 0–0.7)
EOSINOPHIL NFR BLD AUTO: 0.3 %
ERYTHROCYTE [DISTWIDTH] IN BLOOD BY AUTOMATED COUNT: 18.6 % (ref 11–15)
GLUCOSE BLD-MCNC: 226 MG/DL (ref 70–99)
GLUCOSE BLDC GLUCOMTR-MCNC: 211 MG/DL (ref 70–99)
GLUCOSE BLDC GLUCOMTR-MCNC: 305 MG/DL (ref 70–99)
GLUCOSE BLDC GLUCOMTR-MCNC: 308 MG/DL (ref 70–99)
GLUCOSE BLDC GLUCOMTR-MCNC: 316 MG/DL (ref 70–99)
HCT VFR BLD AUTO: 28.7 % (ref 35–48)
HGB BLD-MCNC: 8.8 G/DL (ref 12–16)
IMM GRANULOCYTES # BLD AUTO: 0.08 X10(3) UL (ref 0–1)
IMM GRANULOCYTES NFR BLD: 0.7 %
LYMPHOCYTES # BLD AUTO: 1.22 X10(3) UL (ref 1–4)
LYMPHOCYTES NFR BLD AUTO: 10.9 %
MCH RBC QN AUTO: 27 PG (ref 26–34)
MCHC RBC AUTO-ENTMCNC: 30.7 G/DL (ref 31–37)
MCV RBC AUTO: 88 FL (ref 80–100)
MONOCYTES # BLD AUTO: 0.87 X10(3) UL (ref 0.1–1)
MONOCYTES NFR BLD AUTO: 7.8 %
NEUTROPHILS # BLD AUTO: 8.96 X10 (3) UL (ref 1.5–7.7)
NEUTROPHILS # BLD AUTO: 8.96 X10(3) UL (ref 1.5–7.7)
NEUTROPHILS NFR BLD AUTO: 80.2 %
OSMOLALITY SERPL CALC.SUM OF ELEC: 301 MOSM/KG (ref 275–295)
PLATELET # BLD AUTO: 262 10(3)UL (ref 150–450)
POTASSIUM SERPL-SCNC: 3.8 MMOL/L (ref 3.5–5.1)
RBC # BLD AUTO: 3.26 X10(6)UL (ref 3.8–5.3)
SODIUM SERPL-SCNC: 140 MMOL/L (ref 136–145)
UNIT VOLUME: 350 ML
WBC # BLD AUTO: 11.2 X10(3) UL (ref 4–11)

## 2025-05-04 PROCEDURE — 99233 SBSQ HOSP IP/OBS HIGH 50: CPT | Performed by: INTERNAL MEDICINE

## 2025-05-04 PROCEDURE — 99232 SBSQ HOSP IP/OBS MODERATE 35: CPT | Performed by: INTERNAL MEDICINE

## 2025-05-04 RX ORDER — INSULIN GLARGINE 100 [IU]/ML
10 INJECTION, SOLUTION SUBCUTANEOUS EVERY MORNING
Status: SHIPPED | COMMUNITY
Start: 2025-05-04

## 2025-05-04 RX ORDER — FLUTICASONE PROPIONATE AND SALMETEROL 250; 50 UG/1; UG/1
1 POWDER RESPIRATORY (INHALATION) 2 TIMES DAILY
Qty: 60 EACH | Refills: 0 | Status: SHIPPED | OUTPATIENT
Start: 2025-05-04 | End: 2025-06-03

## 2025-05-04 RX ORDER — LOSARTAN POTASSIUM 100 MG/1
100 TABLET ORAL DAILY
Qty: 30 TABLET | Refills: 0 | Status: SHIPPED | OUTPATIENT
Start: 2025-05-05 | End: 2025-06-04

## 2025-05-04 RX ORDER — LOSARTAN POTASSIUM 100 MG/1
100 TABLET ORAL DAILY
Status: DISCONTINUED | OUTPATIENT
Start: 2025-05-05 | End: 2025-05-05

## 2025-05-04 RX ORDER — LOSARTAN POTASSIUM 50 MG/1
50 TABLET ORAL ONCE
Status: COMPLETED | OUTPATIENT
Start: 2025-05-04 | End: 2025-05-04

## 2025-05-04 RX ORDER — BENZONATATE 200 MG/1
200 CAPSULE ORAL 3 TIMES DAILY PRN
Qty: 15 CAPSULE | Refills: 0 | Status: SHIPPED | OUTPATIENT
Start: 2025-05-04

## 2025-05-04 RX ORDER — PREDNISONE 20 MG/1
TABLET ORAL
Qty: 3 TABLET | Refills: 0 | Status: SHIPPED | OUTPATIENT
Start: 2025-05-05 | End: 2025-05-09

## 2025-05-04 NOTE — PROGRESS NOTES
[facility]  part of [facility]     Hospitalist Progress Note     Chester Bautista Patient Status:  Inpatient    1940 MRN [id number]   Location [facility] 5SW/SE Attending Pérez Aguilera MD   Hosp Day # 5 PCP Heike Sorto MD     Chief Complaint:   Chief Complaint   Patient presents with    Shortness Of Breath        Subjective:     Patient seen sitting in bed.  No family at bedside.  Patient reports episode of anxiety overnight.  Feeling much better today.  Patient denies further signs of dark black stool.   Patient states that shortness of breath improved.      Objective:      Vital signs:  Vitals:    25 1242 25 1358 25 1502 25 1708   BP: (!) 186/80 (!) 169/69 (!) 176/69 (!) 185/78   BP Location: Left arm Left arm Left arm Left arm   Pulse: 88 84 78 84   Resp: 18 18 18 18   Temp: 97.9 °F (36.6 °C) 98.1 °F (36.7 °C)  97.3 °F (36.3 °C)   TempSrc: Oral Oral  Oral   SpO2: 97% 95% 95% 93%   Weight:         No intake or output data in the 24 hours ending 25 1754          Physical Exam:    GENERAL:  Awake and alert, in no acute distress.  HEART:  Regular rhythm, regular rate  LUNGS:  Air entry was minimally decreased.  No increased work of breathing.   ABDOMEN: Soft and non-tender.    PSYCHIATRIC: Normal mood    Diagnostic Data:    Labs:    Recent Labs   Lab 25  0550 25  1204 25  0530 25  1334 25  0543   WBC 12.3*  --  10.0  --  11.2*   HGB 7.1*   < > 6.6* 8.7* 8.8*   MCV 86.3  --  85.2  --  88.0   .0  --  221.0  --  262.0    < > = values in this interval not displayed.       Recent Labs   Lab 25  1103 25  0640 25  0550 25  0530 25  0543   *   < > 145* 155* 226*   BUN 14   < > 33* 30* 23   CREATSERUM 1.22*   < > 1.09* 0.94 0.88   CA 8.2*   < > 8.6* 8.6* 8.6*   ALB 3.3  --   --   --   --       < > 139 142 140   K 3.1*   < > 3.8 3.7 3.8      < > 104 106 105    CO2 26.0   < > 29.0 29.0 28.0   ALKPHO 67  --   --   --   --    AST 30  --   --   --   --    ALT 9*  --   --   --   --    BILT 0.3  --   --   --   --    TP 6.3  --   --   --   --     < > = values in this interval not displayed.           Estimated Creatinine Clearance: 34.6 mL/min (based on SCr of 0.88 mg/dL).    No results for input(s): \"PTP\", \"INR\" in the last 168 hours.         COVID-19  Lab Results   Component Value Date    COVID19 Not Detected 04/29/2025    COVID19 Not Detected 01/17/2025    COVID19 Not Detected 02/02/2024       Pro-Calcitonin  No results for input(s): \"PCT\" in the last 168 hours.    Cardiac  Recent Labs   Lab 04/29/25  1103   PBNP 1,263*       Inflammatory Markers  No results for input(s): \"CRP\", \"ROSALIE\", \"LDH\", \"DDIMER\" in the last 168 hours.    Culture:  Hospital Encounter on 04/29/25   1. Blood Culture     Status: None    Collection Time: 04/29/25  1:01 PM    Specimen: Blood,peripheral   Result Value Ref Range    Blood Culture Result No Growth 5 Days N/A       No results found.              Medications: Scheduled Medications[1]    Assessment & Plan:        Acute hypoxic respiratory failure (HCC)  -Patient presenting with increased shortness of breath with cough  -Noted to be hypoxic requiring supplemental O2, weaning as able   -Patient with history of COPD, ILD   -Chest x-ray reviewed.  Noted mixed patchy bilateral lung opacities.  - Concern for possible acute COPD exacerbation   -Unable to rule out community acquired pneumonia  - Continue on steroids, transitioned to p.o. prednisone completing tapering course.   - Continue DuoNebs and scheduled inhalers.  - Completed course of empiric antibiotics  - Symptom relief as able  - Pulmonology on consult, appreciate further recommendations    Acute on chronic anemia  Possible GI bleed  - Patient noted to have persistent slow downtrend in hemoglobin  - Patient with history of AVMs  - Patient did note intermittent dark black stools.    - Hemoglobin  decreased to 6.6 . status post 1 unit PRBC with good response.  -fecal occult stool heme positive   -No further signs of dark black stools.  - GI consulted, initially started patient on octreotide.  Not recommending further evaluation with procedures at this time.  Appreciate further recommendations.      HTN  - Elevations noted  - Continuing Coreg  -Increase losartan  -As needed hydralazine  - Monitor and adjust as needed    Acute Kidney Injury   -Improving   - Avoiding Nephrotoxic agents  - Cont to monitor    Type 2 DM   -With intermittent hyperglycemia  -Likely worsened due to steroid use as above  -Had episode of hypoglycemia on 5/2.  - Continue Tresiba with scheduled mealtime dosing  - Monitoring Blood glucose with POC checks  - SSI to cover hyperglycemia  - Hypoglycemia protocol  - Will monitor and adjust agents as needed.      History of CVA  - Continue home regimen    Addendum: Patient initially feeling well with plans for discharge to home.  Patient began having worsening blood glucose levels and elevations in blood pressure.  Patient and daughter expressed concerns about returning to home.  Discharge held.  Plan for discharge on 5/5.    Plan of care discussed with patient at bedside . Discussed management/test result(s) with Rn    Quality:  DVT Prophylaxis: Heparin  CODE status: Full  Estimated date of discharge: TBD  Discharge is dependent on: clinical stability    Pérez Aguilera MD          This note was prepared using Dragon Medical voice recognition dictation software. As a result errors may occur. When identified these errors have been corrected. While every attempt is made to correct errors during dictation discrepancies may still exist         [1]    [START ON 5/5/2025] losartan  100 mg Oral Daily    predniSONE  20 mg Oral Daily with breakfast    octreoTIDe  50 mcg Subcutaneous Q8H    insulin aspart  5 Units Subcutaneous TID CC    insulin degludec  16 Units Subcutaneous Daily    magnesium oxide   400 mg Oral Once    aspirin  81 mg Oral Daily    carvedilol  25 mg Oral BID with meals    gabapentin  100 mg Oral Nightly    magnesium oxide  400 mg Oral BID    montelukast  10 mg Oral Nightly    pantoprazole  40 mg Oral BID AC    rOPINIRole  0.25 mg Oral Nightly    rosuvastatin  10 mg Oral Nightly    insulin aspart  1-7 Units Subcutaneous TID CC and HS    fluticasone-salmeterol  1 puff Inhalation BID

## 2025-05-04 NOTE — CM/SW NOTE
05/04/25 1500   Discharge disposition   Expected discharge disposition Home or Self   Outpatient services Outpatient rehab services  (resume Outpatient rehab w/ [facility])   Discharge transportation Private car     Per chart, pt has DC order for today.    Message sent to MAGI Thakur and Dr. Aguilera - requested MD enter order/Rx for Outpatient Rehab that specifies any restrictions pt may have. MD acknowledged request/message.    Pt is cleared from SW/CM stand point.      PLAN: Home w/ resume [facility] Outpatient rehab        PIYUSH Bundy, LSW [phone]

## 2025-05-04 NOTE — PROGRESS NOTES
Pulmonary Medicine Inpatient Progress Note                 Subjective:  Afebrile  Per RN pt received ativan overnight for agitation and then placed on 4 LNC supp 02. Weaned to RA now with sats 93%. Pt feels better  Has some coughing       ALLERGIES:  Allergies[1]     MEDS:  Home Medications:  Medications Taking[2]  Scheduled Medication:  Scheduled Medications[3]  Continuous Infusing Medication:  Medication Infusions[4]  PRN Medications:  PRN Medications[5]       PHYSICAL EXAM:  /63 (BP Location: Left arm)   Pulse 78   Temp 98.2 °F (36.8 °C) (Temporal)   Resp 18   Wt 139 lb 12.8 oz (63.4 kg)   SpO2 93%   BMI 26.41 kg/m²   CONSTITUTIONAL: alert, oriented, no apparent distress  HEENT: atraumatic normocephalic  MOUTH: mucous membranes are moist. No OP exudates  NECK/THROAT: no JVD. Trachea midline. No obvious thyromegaly  LUNG: clear upper b/l no wheezing, + faint crackles. Diminished bases. Chest symmetric with respiratory motion  HEART: regular rate and rhythm, no obvious murmers or gallops note  ABD: soft non tender. + bowel sounds. No organomegaly noted  EXT: no clubbing, cyanosis, or edema noted       IMAGES:  CXR 4/29/25  CONCLUSION:   Mixed patchy bilateral lung opacity mildly increased since the prior study which may relate to acute infection or edema superimposed on chronic changes.          LABS:  Recent Labs   Lab 05/02/25  0550 05/02/25  1204 05/03/25  0530 05/03/25  1334 05/04/25  0543   RBC 2.78*  --  2.70*  --  3.26*   HGB 7.1*   < > 6.6* 8.7* 8.8*   HCT 24.0*   < > 23.0* 28.8* 28.7*   MCV 86.3  --  85.2  --  88.0   MCH 25.5*  --  24.4*  --  27.0   MCHC 29.6*  --  28.7*  --  30.7*   RDW 20.0*  --  20.0*  --  18.6*   NEPRELIM 10.43*  --  8.05*  --  8.96*   WBC 12.3*  --  10.0  --  11.2*   .0  --  221.0  --  262.0    < > = values in this interval not displayed.       Recent Labs   Lab 04/29/25  1103 04/30/25  0640 05/02/25  0550 05/03/25  0530 05/04/25  0543   *   < > 145* 155*  226*   BUN 14   < > 33* 30* 23   CREATSERUM 1.22*   < > 1.09* 0.94 0.88   EGFRCR 43*   < > 49* 59* 64   CA 8.2*   < > 8.6* 8.6* 8.6*   ALB 3.3  --   --   --   --       < > 139 142 140   K 3.1*   < > 3.8 3.7 3.8      < > 104 106 105   CO2 26.0   < > 29.0 29.0 28.0   ALKPHO 67  --   --   --   --    AST 30  --   --   --   --    ALT 9*  --   --   --   --    BILT 0.3  --   --   --   --    TP 6.3  --   --   --   --     < > = values in this interval not displayed.       ASSESSMENT/PLAN:  COPD with exac. Hx of CPFE  -on advair.  -consider adding incruse as outpt  -on PRN duoneb  -prednisone 20 mg/day-continue taper   -supp 02 as needed. Currently on RA. Perform desat screen b/f discharge  -avoid BZDs     Possible CAP  -looks like abx stopped?  -pt remains afebrile with stable WBC ct    Acute on chronic anemia  -GI following  -on octreotide  -transfuse for Hg < 7    Hx of CVA  -supportive measures    Proph  -DVT: SCDS with anemia  -GI: PPI    Dispo  -full code  -ok for discharge from pulm standpt. F/u with Dr. Parker in 2-3 weeks     Thank you for the opportunity to care for Chester BautistaTanvir Roque DO, MPH  Pulmonary Critical Care Medicine  [facility] Pulmonary and Critical Care Medicine          [1]   Allergies  Allergen Reactions    Celebrex [Celecoxib] PALPITATIONS    Penicillins ITCHING and SWELLING    Amlodipine OTHER (SEE COMMENTS)     Calves Swelling     Metformin And Related UNKNOWN    Olmesartan UNKNOWN   [2]   Current Facility-Administered Medications for the 4/29/25 encounter (Hospital Encounter)   Medication Dose Route Frequency Provider Last Rate Last Admin    Certolizumab Pegol  mg  200 mg Subcutaneous Q28 Days Ashia Goodrich MD   200 mg at 04/04/25 1334     Outpatient Medications Marked as Taking for the 4/29/25 encounter (Hospital Encounter)   Medication Sig Dispense Refill    fluticasone furoate-vilanterol (BREO ELLIPTA) 100-25 MCG/ACT Inhalation Aerosol Powder,  Breath Activated Inhale 1 puff into the lungs daily.      gabapentin 100 MG Oral Cap Take 1 capsule (100 mg total) by mouth nightly.      glipiZIDE 10 MG Oral Tab Take 1 tablet (10 mg total) by mouth daily with breakfast. 90 tablet 1    losartan 25 MG Oral Tab Take 1 tablet (25 mg total) by mouth in the morning.      insulin lispro 100 UNIT/ML Injection Solution Inject 3 Units into the skin 3 (three) times daily before meals. Sliding scale depending on blood sugar      carvedilol 25 MG Oral Tab Take 1 tablet (25 mg total) by mouth 2 (two) times daily with meals.      rOPINIRole 0.25 MG Oral Tab Take 1 tablet (0.25 mg total) by mouth at bedtime.      LANTUS SOLOSTAR 100 UNIT/ML Subcutaneous Solution Pen-injector Inject 14 Units into the skin every morning.      [] sodium chloride 0.9% SOLN 100 mL with ferric gluconate 12.5 mg/mL SOLN 250 mg Inject 250 mg into the vein once for 1 dose. 250 mg 0    pantoprazole 40 MG Oral Tab EC Take 1 tablet (40 mg total) by mouth in the morning and 1 tablet (40 mg total) in the evening. Take before meals.      aspirin 81 MG Oral Tab EC Take 1 tablet (81 mg total) by mouth in the morning.      acetaminophen 325 MG Oral Tab Take 2 tablets (650 mg total) by mouth every 6 (six) hours as needed.      rosuvastatin 20 MG Oral Tab Take 1 tablet (20 mg total) by mouth nightly. (Patient taking differently: Take 0.5 tablets (10 mg total) by mouth nightly.) 90 tablet 3    magnesium oxide 400 MG Oral Tab Take 1 tablet (400 mg total) by mouth 2 (two) times daily. Per Dr. JENSEN Mata      predniSONE 5 MG Oral Tab Take 1 tablet (5 mg total) by mouth daily. 90 tablet 1    montelukast 10 MG Oral Tab Take 1 tablet (10 mg total) by mouth nightly. 90 tablet 3   [3]    predniSONE  20 mg Oral Daily with breakfast    octreoTIDe  50 mcg Subcutaneous Q8H    losartan  50 mg Oral Daily    insulin aspart  5 Units Subcutaneous TID CC    insulin degludec  16 Units Subcutaneous Daily    magnesium oxide  400 mg  Oral Once    aspirin  81 mg Oral Daily    carvedilol  25 mg Oral BID with meals    gabapentin  100 mg Oral Nightly    magnesium oxide  400 mg Oral BID    montelukast  10 mg Oral Nightly    pantoprazole  40 mg Oral BID AC    rOPINIRole  0.25 mg Oral Nightly    rosuvastatin  10 mg Oral Nightly    insulin aspart  1-7 Units Subcutaneous TID CC and HS    fluticasone-salmeterol  1 puff Inhalation BID   [4] [5]   hydrALAzine    ALPRAZolam    acetaminophen    ipratropium-albuterol    glucose **OR** glucose **OR** glucose-vitamin C **OR** dextrose **OR** glucose **OR** glucose **OR** glucose-vitamin C    ondansetron    metoclopramide    guaiFENesin    benzonatate    temazepam    sennosides

## 2025-05-04 NOTE — PLAN OF CARE
Problem: PAIN - ADULT  Goal: Verbalizes/displays adequate comfort level or patient's stated pain goal  Description: INTERVENTIONS:- Encourage pt to monitor pain and request assistance- Assess pain using appropriate pain scale- Administer analgesics based on type and severity of pain and evaluate response- Implement non-pharmacological measures as appropriate and evaluate response- Consider cultural and social influences on pain and pain management- Manage/alleviate anxiety- Utilize distraction and/or relaxation techniques- Monitor for opioid side effects- Notify MD/LIP if interventions unsuccessful or patient reports new pain- Anticipate increased pain with activity and pre-medicate as appropriate  Outcome: Progressing     Problem: RESPIRATORY - ADULT  Goal: Achieves optimal ventilation and oxygenation  Description: INTERVENTIONS:- Assess for changes in respiratory status- Assess for changes in mentation and behavior- Position to facilitate oxygenation and minimize respiratory effort- Oxygen supplementation based on oxygen saturation or ABGs- Provide Smoking Cessation handout, if applicable- Encourage broncho-pulmonary hygiene including cough, deep breathe, Incentive Spirometry- Assess the need for suctioning and perform as needed- Assess and instruct to report SOB or any respiratory difficulty- Respiratory Therapy support as indicated- Manage/alleviate anxiety- Monitor for signs/symptoms of CO2 retention  Outcome: Progressing     Problem: DISCHARGE PLANNING  Goal: Discharge to home or other facility with appropriate resources  Description: INTERVENTIONS:- Identify barriers to discharge w/pt and caregiver- Include patient/family/discharge partner in discharge planning- Arrange for needed discharge resources and transportation as appropriate- Identify discharge learning needs (meds, wound care, etc)- Arrange for interpreters to assist at discharge as needed- Consider post-discharge preferences of  patient/family/discharge partner- Complete POLST form as appropriate- Assess patient's ability to be responsible for managing their own health- Refer to Case Management Department for coordinating discharge planning if the patient needs post-hospital services based on physician/LIP order or complex needs related to functional status, cognitive ability or social support system  Outcome: Progressing     Problem: HEMATOLOGIC - ADULT  Goal: Maintains hematologic stability  Description: INTERVENTIONS- Assess for signs and symptoms of bleeding or hemorrhage- Monitor labs and vital signs for trends- Administer supportive blood products/factors, fluids and medications as ordered and appropriate- Administer supportive blood products/factors as ordered and appropriate  Outcome: Progressing     Problem: METABOLIC/FLUID AND ELECTROLYTES - ADULT  Goal: Glucose maintained within prescribed range  Description: INTERVENTIONS:- Monitor Blood Glucose as ordered- Assess for signs and symptoms of hyperglycemia and hypoglycemia- Administer ordered medications to maintain glucose within target range- Assess barriers to adequate nutritional intake and initiate nutrition consult as needed- Instruct patient on self management of diabetes  Outcome: Progressing

## 2025-05-04 NOTE — PLAN OF CARE
Problem: Patient Centered Care  Goal: Patient preferences are identified and integrated in the patient's plan of care  Description: Interventions:- What would you like us to know as we care for you? - Provide timely, complete, and accurate information to patient/family- Incorporate patient and family knowledge, values, beliefs, and cultural backgrounds into the planning and delivery of care- Encourage patient/family to participate in care and decision-making at the level they choose- Honor patient and family perspectives and choices  Outcome: Progressing     Problem: PAIN - ADULT  Goal: Verbalizes/displays adequate comfort level or patient's stated pain goal  Description: INTERVENTIONS:- Encourage pt to monitor pain and request assistance- Assess pain using appropriate pain scale- Administer analgesics based on type and severity of pain and evaluate response- Implement non-pharmacological measures as appropriate and evaluate response- Consider cultural and social influences on pain and pain management- Manage/alleviate anxiety- Utilize distraction and/or relaxation techniques- Monitor for opioid side effects- Notify MD/LIP if interventions unsuccessful or patient reports new pain- Anticipate increased pain with activity and pre-medicate as appropriate  Outcome: Progressing     Problem: RESPIRATORY - ADULT  Goal: Achieves optimal ventilation and oxygenation  Description: INTERVENTIONS:- Assess for changes in respiratory status- Assess for changes in mentation and behavior- Position to facilitate oxygenation and minimize respiratory effort- Oxygen supplementation based on oxygen saturation or ABGs- Provide Smoking Cessation handout, if applicable- Encourage broncho-pulmonary hygiene including cough, deep breathe, Incentive Spirometry- Assess the need for suctioning and perform as needed- Assess and instruct to report SOB or any respiratory difficulty- Respiratory Therapy support as indicated- Manage/alleviate  anxiety- Monitor for signs/symptoms of CO2 retention  Outcome: Progressing     Problem: DISCHARGE PLANNING  Goal: Discharge to home or other facility with appropriate resources  Description: INTERVENTIONS:- Identify barriers to discharge w/pt and caregiver- Include patient/family/discharge partner in discharge planning- Arrange for needed discharge resources and transportation as appropriate- Identify discharge learning needs (meds, wound care, etc)- Arrange for interpreters to assist at discharge as needed- Consider post-discharge preferences of patient/family/discharge partner- Complete POLST form as appropriate- Assess patient's ability to be responsible for managing their own health- Refer to Case Management Department for coordinating discharge planning if the patient needs post-hospital services based on physician/LIP order or complex needs related to functional status, cognitive ability or social support system  Outcome: Progressing     Problem: HEMATOLOGIC - ADULT  Goal: Maintains hematologic stability  Description: INTERVENTIONS- Assess for signs and symptoms of bleeding or hemorrhage- Monitor labs and vital signs for trends- Administer supportive blood products/factors, fluids and medications as ordered and appropriate- Administer supportive blood products/factors as ordered and appropriate  Outcome: Progressing     Problem: METABOLIC/FLUID AND ELECTROLYTES - ADULT  Goal: Glucose maintained within prescribed range  Description: INTERVENTIONS:- Monitor Blood Glucose as ordered- Assess for signs and symptoms of hyperglycemia and hypoglycemia- Administer ordered medications to maintain glucose within target range- Assess barriers to adequate nutritional intake and initiate nutrition consult as needed- Instruct patient on self management of diabetes  Outcome: Progressing    Patient is presently resting in the bed. Alert x 4. Vital signs taken and monitored. Tolerates diet well. Fall risk precautions are  followed at all times Patient ambulates in room and tolerates it well. Patient is medicated as needed for pain or discomfort. Patient was medicated for elevated blood pressure with iv hydralazine per protocol. Call light within reach at all times.

## 2025-05-04 NOTE — PROGRESS NOTES
[facility]     Gastroenterology Progress Note    Chester Bautista Patient Status:  Inpatient    1940 MRN [id number]   Location [facility] 5SW/SE Attending Pérez Aguilera MD   Hosp Day # 5 PCP Heike Sorto MD       Subjective:   Says she is feeling okay.  Tired this morning.  No bowel movements in the last 24 hours.  Denies abdominal pain, chest pain, shortness of breath.    Objective:   Blood pressure 148/63, pulse 78, temperature 98.2 °F (36.8 °C), temperature source Temporal, resp. rate 18, weight 139 lb 12.8 oz (63.4 kg), SpO2 93%. Body mass index is 26.41 kg/m².    General:awake, cooperative, no acute distress  HEENT: EOMI, no scleral icterus, MMM; oral pharnyx is without exudates or lesions  Neck: no lymphadenopathy; thyroid is not enlarged and without nodules  CV: RRR  Resp: non-labored breathing  Abd: soft, non-tender, non-distended  Ext: no lower extremity swelling  Neuro: Alert, Oriented X 3  Skin: no rashes, bruises  Psych: normal affect    Results:     Lab Results   Component Value Date    WBC 11.2 (H) 2025    HGB 8.8 (L) 2025    HCT 28.7 (L) 2025    .0 2025    CREATSERUM 0.88 2025    BUN 23 2025     2025    K 3.8 2025     2025    CO2 28.0 2025     (H) 2025    CA 8.6 (L) 2025    ALB 3.3 2025    ALKPHO 67 2025    BILT 0.3 2025    TP 6.3 2025    AST 30 2025    ALT 9 (L) 2025    PTT 51.3 (H) 2025    INR 1.04 2025    T4F 1.2 2025    TSH 0.418 (L) 2025    DDIMER 2.77 (H) 2025    ESRML 56 (H) 2025    CRP 1.60 (H) 2025    MG 2.0 2025    PHOS 4.0 2025    B12 1,058 (H) 2025     No results found.        Assessment and Plan:   Chester Bautista is a 85 year old woman with history of chronic kidney disease, diabetes, sarcoidosis, dyslipidemia, hypertension,  infiltrating ductal carcinoma of the breast, rheumatoid arthritis, osteoarthrosis, peripheral vascular disease, multiple listed eye conditions, COPD, stroke who was admitted 4/29/2025 after presenting to the emergency department with low oxygen saturation being treated for acute on chronic respiratory failure from COPD     Gastrointestinal consult service was asked to see the patient 5/3 regarding acute on chronic anemia in the setting of a history of thought to be 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  Review of the chart notes multiple gastrointestinal procedures in the last approximately 3 years for similar problems.  Appears she was most recently seen by the gastrointestinal service January 2025 with similar issues during the hospitalization for a CVA.  Appears there were many discussions regarding her chronic condition and the challenges that are posed by it from medical and endoscopic management with non-endoscopic management at that time.  We again reviewed this 5/3 and unclear at this point that aggressively pursuing endoscopic evaluation has benefits that outweigh the risks and the potential low yield for endoscopic therapy in the setting. We discussed at this time time thus recommended medical management (blood transfusion, monitoring blood and iron counts, octreotide) and certainly consideration of endoscopic/colonoscopic evaluation pending clinical course.    Noted response to blood transfusion without any GI output in the last 24 hours to suggest active/clinically significant GI bleeding for which change in management at this time is recommended.     Recommend:  -blood transfusion prn  -monitor blood counts and GI output  -octreotide    MD Terrell Gonzalez-[facility] - Gastroenterology  5/4/2025

## 2025-05-05 ENCOUNTER — PATIENT OUTREACH (OUTPATIENT)
Dept: CASE MANAGEMENT | Age: 85
End: 2025-05-05

## 2025-05-05 ENCOUNTER — TELEPHONE (OUTPATIENT)
Dept: RHEUMATOLOGY | Facility: CLINIC | Age: 85
End: 2025-05-05

## 2025-05-05 VITALS
TEMPERATURE: 98 F | OXYGEN SATURATION: 96 % | HEART RATE: 79 BPM | WEIGHT: 139.81 LBS | BODY MASS INDEX: 26 KG/M2 | DIASTOLIC BLOOD PRESSURE: 74 MMHG | SYSTOLIC BLOOD PRESSURE: 167 MMHG | RESPIRATION RATE: 16 BRPM

## 2025-05-05 LAB
ANION GAP SERPL CALC-SCNC: 9 MMOL/L (ref 0–18)
BASOPHILS # BLD AUTO: 0.01 X10(3) UL (ref 0–0.2)
BASOPHILS NFR BLD AUTO: 0.1 %
BUN BLD-MCNC: 30 MG/DL (ref 9–23)
BUN/CREAT SERPL: 31.3 (ref 10–20)
CALCIUM BLD-MCNC: 8.8 MG/DL (ref 8.7–10.4)
CHLORIDE SERPL-SCNC: 104 MMOL/L (ref 98–112)
CO2 SERPL-SCNC: 27 MMOL/L (ref 21–32)
CREAT BLD-MCNC: 0.96 MG/DL (ref 0.55–1.02)
DEPRECATED RDW RBC AUTO: 59.2 FL (ref 35.1–46.3)
EGFRCR SERPLBLD CKD-EPI 2021: 58 ML/MIN/1.73M2 (ref 60–?)
EOSINOPHIL # BLD AUTO: 0.1 X10(3) UL (ref 0–0.7)
EOSINOPHIL NFR BLD AUTO: 0.9 %
ERYTHROCYTE [DISTWIDTH] IN BLOOD BY AUTOMATED COUNT: 19.5 % (ref 11–15)
GLUCOSE BLD-MCNC: 210 MG/DL (ref 70–99)
GLUCOSE BLDC GLUCOMTR-MCNC: 166 MG/DL (ref 70–99)
GLUCOSE BLDC GLUCOMTR-MCNC: 240 MG/DL (ref 70–99)
HCT VFR BLD AUTO: 28.4 % (ref 35–48)
HGB BLD-MCNC: 8.9 G/DL (ref 12–16)
IMM GRANULOCYTES # BLD AUTO: 0.08 X10(3) UL (ref 0–1)
IMM GRANULOCYTES NFR BLD: 0.7 %
LYMPHOCYTES # BLD AUTO: 1.45 X10(3) UL (ref 1–4)
LYMPHOCYTES NFR BLD AUTO: 13.2 %
MCH RBC QN AUTO: 26.6 PG (ref 26–34)
MCHC RBC AUTO-ENTMCNC: 31.3 G/DL (ref 31–37)
MCV RBC AUTO: 85 FL (ref 80–100)
MONOCYTES # BLD AUTO: 0.94 X10(3) UL (ref 0.1–1)
MONOCYTES NFR BLD AUTO: 8.6 %
NEUTROPHILS # BLD AUTO: 8.41 X10 (3) UL (ref 1.5–7.7)
NEUTROPHILS # BLD AUTO: 8.41 X10(3) UL (ref 1.5–7.7)
NEUTROPHILS NFR BLD AUTO: 76.5 %
OSMOLALITY SERPL CALC.SUM OF ELEC: 302 MOSM/KG (ref 275–295)
PLATELET # BLD AUTO: 320 10(3)UL (ref 150–450)
POTASSIUM SERPL-SCNC: 3.9 MMOL/L (ref 3.5–5.1)
RBC # BLD AUTO: 3.34 X10(6)UL (ref 3.8–5.3)
SODIUM SERPL-SCNC: 140 MMOL/L (ref 136–145)
WBC # BLD AUTO: 11 X10(3) UL (ref 4–11)

## 2025-05-05 PROCEDURE — 99239 HOSP IP/OBS DSCHRG MGMT >30: CPT | Performed by: INTERNAL MEDICINE

## 2025-05-05 PROCEDURE — 99232 SBSQ HOSP IP/OBS MODERATE 35: CPT | Performed by: INTERNAL MEDICINE

## 2025-05-05 PROCEDURE — 99233 SBSQ HOSP IP/OBS HIGH 50: CPT | Performed by: INTERNAL MEDICINE

## 2025-05-05 RX ORDER — CARVEDILOL 25 MG/1
25 TABLET ORAL 2 TIMES DAILY WITH MEALS
Qty: 60 TABLET | Refills: 1 | Status: SHIPPED | OUTPATIENT
Start: 2025-05-05

## 2025-05-05 RX ORDER — HYDRALAZINE HYDROCHLORIDE 25 MG/1
25 TABLET, FILM COATED ORAL EVERY 8 HOURS SCHEDULED
Qty: 90 TABLET | Refills: 0 | Status: SHIPPED | OUTPATIENT
Start: 2025-05-05 | End: 2025-06-04

## 2025-05-05 RX ORDER — ROPINIROLE 0.25 MG/1
0.25 TABLET, FILM COATED ORAL EVERY EVENING
Qty: 30 TABLET | Refills: 1 | Status: SHIPPED | OUTPATIENT
Start: 2025-05-05

## 2025-05-05 RX ORDER — PANTOPRAZOLE SODIUM 40 MG/1
40 TABLET, DELAYED RELEASE ORAL
Qty: 60 TABLET | Refills: 0 | Status: SHIPPED | OUTPATIENT
Start: 2025-05-05

## 2025-05-05 RX ORDER — ASPIRIN 81 MG/1
81 TABLET ORAL DAILY
Qty: 90 TABLET | Refills: 3 | Status: SHIPPED | OUTPATIENT
Start: 2025-05-05

## 2025-05-05 RX ORDER — HYDRALAZINE HYDROCHLORIDE 25 MG/1
25 TABLET, FILM COATED ORAL EVERY 8 HOURS SCHEDULED
Status: DISCONTINUED | OUTPATIENT
Start: 2025-05-05 | End: 2025-05-05

## 2025-05-05 RX ORDER — ROSUVASTATIN CALCIUM 10 MG/1
10 TABLET, COATED ORAL NIGHTLY
Qty: 90 TABLET | Refills: 3 | Status: SHIPPED | OUTPATIENT
Start: 2025-05-05

## 2025-05-05 NOTE — PROGRESS NOTES
05/05/25 1700   Mobility   O2 walk? Yes   SPO2% on Room Air at Rest 98   SPO2% Ambulation on Room Air 91

## 2025-05-05 NOTE — PROGRESS NOTES
[facility]     Gastroenterology Progress Note    Chester Bautista Patient Status:  Inpatient    1940 MRN [id number]   Location [facility] 5SW/SE Attending Pérez Aguilera MD   Hosp Day # 6 PCP Heike Sorto MD       Subjective:   Says she is feeling okay. No bowel movement. Denies abdominal pain, chest pain, shortness of breath.    Objective:   Blood pressure (!) 167/74, pulse 79, temperature 98.3 °F (36.8 °C), temperature source Oral, resp. rate 16, weight 139 lb 12.8 oz (63.4 kg), SpO2 96%. Body mass index is 26.41 kg/m².    General:awake, cooperative, no acute distress  HEENT: EOMI, no scleral icterus, MMM; oral pharnyx is without exudates or lesions  Neck: no lymphadenopathy; thyroid is not enlarged and without nodules  CV: RRR  Resp: non-labored breathing  Abd: soft, non-tender, non-distended  Ext: no lower extremity swelling  Neuro: Alert, Oriented X 3  Skin: no rashes, bruises  Psych: normal affect    Results:     Lab Results   Component Value Date    WBC 11.0 2025    HGB 8.9 (L) 2025    HCT 28.4 (L) 2025    .0 2025    CREATSERUM 0.96 2025    BUN 30 (H) 2025     2025    K 3.9 2025     2025    CO2 27.0 2025     (H) 2025    CA 8.8 2025    ALB 3.3 2025    ALKPHO 67 2025    BILT 0.3 2025    TP 6.3 2025    AST 30 2025    ALT 9 (L) 2025    PTT 51.3 (H) 2025    INR 1.04 2025    T4F 1.2 2025    TSH 0.418 (L) 2025    DDIMER 2.77 (H) 2025    ESRML 56 (H) 2025    CRP 1.60 (H) 2025    MG 2.0 2025    PHOS 4.0 2025    B12 1,058 (H) 2025     No results found.        Assessment and Plan:   Chester Bautista is a 85 year old woman with history of chronic kidney disease, diabetes, sarcoidosis, dyslipidemia, hypertension, infiltrating ductal carcinoma of the breast, rheumatoid  arthritis, osteoarthrosis, peripheral vascular disease, multiple listed eye conditions, COPD, stroke who was admitted 4/29/2025 after presenting to the emergency department with low oxygen saturation being treated for acute on chronic respiratory failure from COPD     Gastrointestinal consult service was asked to see the patient 5/3 regarding acute on chronic anemia in the setting of a history of thought to be 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  Review of the chart notes multiple gastrointestinal procedures in the last approximately 3 years for similar problems.  Appears she was most recently seen by the gastrointestinal service January 2025 with similar issues during the hospitalization for a CVA.  Appears there were many discussions regarding her chronic condition and the challenges that are posed by it from medical and endoscopic management with non-endoscopic management at that time.  We again reviewed this 5/3 and unclear at this point that aggressively pursuing endoscopic evaluation has benefits that outweigh the risks and the potential low yield for endoscopic therapy in the setting. We discussed at this time time thus recommended medical management (blood transfusion, monitoring blood and iron counts, octreotide) and certainly consideration of endoscopic/colonoscopic evaluation pending clinical course.    Noted response to blood transfusion without any GI output in the last 24 hours to suggest active/clinically significant GI bleeding for which change in management at this time is recommended.     Recommend:  -blood transfusion prn  -monitor blood counts and GI output  -octreotide for medical mgmt     GI will sign off, please call with questions    Zoila Mcqueen MD

## 2025-05-05 NOTE — PLAN OF CARE
Patient has safety precautions in place bed in the lowest position, bed alarm on, and call light within reach. Continue to monitor patient with intentional nursing rounds.     Problem: Patient Centered Care  Goal: Patient preferences are identified and integrated in the patient's plan of care  Description: Interventions:- What would you like us to know as we care for you? - Provide timely, complete, and accurate information to patient/family- Incorporate patient and family knowledge, values, beliefs, and cultural backgrounds into the planning and delivery of care- Encourage patient/family to participate in care and decision-making at the level they choose- Honor patient and family perspectives and choices  Outcome: Progressing     Problem: PAIN - ADULT  Goal: Verbalizes/displays adequate comfort level or patient's stated pain goal  Description: INTERVENTIONS:- Encourage pt to monitor pain and request assistance- Assess pain using appropriate pain scale- Administer analgesics based on type and severity of pain and evaluate response- Implement non-pharmacological measures as appropriate and evaluate response- Consider cultural and social influences on pain and pain management- Manage/alleviate anxiety- Utilize distraction and/or relaxation techniques- Monitor for opioid side effects- Notify MD/LIP if interventions unsuccessful or patient reports new pain- Anticipate increased pain with activity and pre-medicate as appropriate  Outcome: Progressing     Problem: RESPIRATORY - ADULT  Goal: Achieves optimal ventilation and oxygenation  Description: INTERVENTIONS:- Assess for changes in respiratory status- Assess for changes in mentation and behavior- Position to facilitate oxygenation and minimize respiratory effort- Oxygen supplementation based on oxygen saturation or ABGs- Provide Smoking Cessation handout, if applicable- Encourage broncho-pulmonary hygiene including cough, deep breathe, Incentive Spirometry- Assess the  need for suctioning and perform as needed- Assess and instruct to report SOB or any respiratory difficulty- Respiratory Therapy support as indicated- Manage/alleviate anxiety- Monitor for signs/symptoms of CO2 retention  Outcome: Progressing     Problem: DISCHARGE PLANNING  Goal: Discharge to home or other facility with appropriate resources  Description: INTERVENTIONS:- Identify barriers to discharge w/pt and caregiver- Include patient/family/discharge partner in discharge planning- Arrange for needed discharge resources and transportation as appropriate- Identify discharge learning needs (meds, wound care, etc)- Arrange for interpreters to assist at discharge as needed- Consider post-discharge preferences of patient/family/discharge partner- Complete POLST form as appropriate- Assess patient's ability to be responsible for managing their own health- Refer to Case Management Department for coordinating discharge planning if the patient needs post-hospital services based on physician/LIP order or complex needs related to functional status, cognitive ability or social support system  Outcome: Progressing     Problem: HEMATOLOGIC - ADULT  Goal: Maintains hematologic stability  Description: INTERVENTIONS- Assess for signs and symptoms of bleeding or hemorrhage- Monitor labs and vital signs for trends- Administer supportive blood products/factors, fluids and medications as ordered and appropriate- Administer supportive blood products/factors as ordered and appropriate  Outcome: Progressing     Problem: METABOLIC/FLUID AND ELECTROLYTES - ADULT  Goal: Glucose maintained within prescribed range  Description: INTERVENTIONS:- Monitor Blood Glucose as ordered- Assess for signs and symptoms of hyperglycemia and hypoglycemia- Administer ordered medications to maintain glucose within target range- Assess barriers to adequate nutritional intake and initiate nutrition consult as needed- Instruct patient on self management of  diabetes  Outcome: Progressing

## 2025-05-05 NOTE — TELEPHONE ENCOUNTER
Екатерина nurse form [facility] calling for patient, patient has injection tomorrow and patient was seen at hospital, she is asking if she's okay going to tomorrows appointment for injection cimzia     Please call patient.

## 2025-05-05 NOTE — TELEPHONE ENCOUNTER
Discharge    Medication List Given To Patient      Acetaminophen 650 mg Every 6 hours PRN    Albuterol Sulfate 108 (90 Base) MCG/ACT TAKE 2 PUFFS BY MOUTH EVERY 4 TO 6 HOURS AS NEEDED    Aspirin 81 mg Daily    Benzonatate 200 mg Oral 3 times daily PRN    Blood Gluc Meter Disp-Strips Dx. E11.9. Checks qam.    Blood Glucose Monitoring Suppl      w/Device Kit, 1 each Does not apply Daily, Test 1x daily    Dx. E11.9. Checks qam.    w/Device Kit, Dx. E11.9. Checks qam.    Carvedilol 25 MG Take 1 tablet (25 mg total) by mouth 2 (two) times daily with meals.    Certolizumab Pegol 200 mg Subcutaneous Every 28 days    Fluticasone Furoate-Vilanterol 100-25 MCG/ACT 1 puff Daily    Fluticasone-Salmeterol 250-50 MCG/ACT 1 puff Inhalation 2 times daily    Gabapentin 100 mg Nightly    glipiZIDE 10 mg Oral Daily with breakfast    hydrALAZINE HCl 25 mg Oral Every 8 hours scheduled    Insulin Glargine 16 Units Every morning    Insulin Lispro 3 Units 3 times daily before meals    Insulin Pen Needle 32G X 4 MM 1 EACH DAILY. USE DAILY WITH INSULIN    Ipratropium Bromide 500 mcg Nebulization 2 times daily    Lancets      4 x daily    Check sugar once daily as directed (E11.42)    Lidocaine-Menthol 4-1 % 1 patch Daily    Losartan Potassium 100 mg Oral Daily    Magnesium Oxide 400 mg 2 times daily    Montelukast Sodium 10 mg Oral Nightly    Pantoprazole Sodium 40 mg 2 times daily before meals    predniSONE      5 mg Oral Daily    20 MG Tabs, Take 1 tablet (20 mg total) by mouth daily with breakfast for 2 days, THEN 0.5 tablets (10 mg total) daily with breakfast for 2 days.    rOPINIRole HCl 0.25 mg Nightly    Rosuvastatin Calcium 20 mg Oral Nightly  Patient taking differently: Take 0.5 tablets (10 mg total) by mouth nightly.    Sennosides 8.6 mg 2 times daily

## 2025-05-05 NOTE — PROGRESS NOTES
[facility]  part of [facility]    Progress Note      Assessment and Plan:   1.  Acute on chronic respiratory failure-COPD, possible pneumonitis with chronic severe anemia.  Patient looks good at present and is off oxygen.  She has combined pulmonary fibrosis and emphysema.  She is breathing comfortably and off antibiotics.  The hemoglobin is good now; however, she yet has black stools.  Currently off oxygen again    Recommendations:  1.  Discharge planning  2.  Ongoing management of anemia by hematology Dr. Mohamud.  3.  GI follow-up.  4.  MDI and nebulizer therapy  5.  Decrease prednisone to 20 mg.  Taper as outpatient.    2.  DVT prophylaxis-subcutaneous heparin    3.  Multiple strokes with history of aortic arch thrombus status post stenting    4.  AVMs with history of GI bleed    5.  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    Subjective:   Chester Bautista is a(n) 85 year old female who is breathing more comfortably.  No further agitation overnight.  Now off oxygen.    Objective:   Blood pressure (!) 167/74, pulse 79, temperature 98.3 °F (36.8 °C), temperature source Oral, resp. rate 16, weight 139 lb 12.8 oz (63.4 kg), SpO2 96%.    Physical Exam alert black female  HEENT examination is unremarkable with pupils equal round and reactive to light and accommodation.   Neck without adenopathy, thyromegaly, JVD nor bruit.   Lungs diminished to auscultation and percussion.  Cardiac regular rate and rhythm no murmur.   Abdomen nontender, without hepatosplenomegaly and no mass appreciable.   Extremities without clubbing cyanosis nor edema.   Neurologic grossly intact with symmetric tone and strength and reflex.  Skin without gross abnormality     Results:     Lab Results   Component Value Date    WBC 11.0 05/05/2025    HGB 8.9 05/05/2025    HCT 28.4 05/05/2025    .0 05/05/2025    CREATSERUM 0.96 05/05/2025    BUN 30 05/05/2025     05/05/2025    K 3.9 05/05/2025      05/05/2025    CO2 27.0 05/05/2025     05/05/2025    CA 8.8 05/05/2025       Chapin Parker MD  Medical Director, Critical Care, [facility]  Medical Director, [facility]  Pager: [phone]

## 2025-05-05 NOTE — TELEPHONE ENCOUNTER
Per hospital discharge medication list. The medications that were requested are on the medication list copied in message below. Dr. Sorto , please see pended medications.

## 2025-05-05 NOTE — TELEPHONE ENCOUNTER
Dr. Goodrich - patient was just discharged on Monday 5/5/25. She wants to know if okay for Cimzia injection on Tuesday 5/6 @ 4pm. She was hospitalized for Acute hypoxic respiratory failure, COPD, Pneumonia and Acute on chronic anemia.    Please advise.

## 2025-05-05 NOTE — PLAN OF CARE
No acute changes. Cleared for discharge. Patient and daughter at bedside verbalized understanding of discharge instructions. Right chest port de-accessed. Transportation home provided by daughter. All personal belongings present with patient at time of discharge.     Problem: Patient Centered Care  Goal: Patient preferences are identified and integrated in the patient's plan of care  Description: Interventions:- What would you like us to know as we care for you? From home- Provide timely, complete, and accurate information to patient/family- Incorporate patient and family knowledge, values, beliefs, and cultural backgrounds into the planning and delivery of care- Encourage patient/family to participate in care and decision-making at the level they choose- Honor patient and family perspectives and choices  Outcome: Adequate for Discharge     Problem: PAIN - ADULT  Goal: Verbalizes/displays adequate comfort level or patient's stated pain goal  Description: INTERVENTIONS:- Encourage pt to monitor pain and request assistance- Assess pain using appropriate pain scale- Administer analgesics based on type and severity of pain and evaluate response- Implement non-pharmacological measures as appropriate and evaluate response- Consider cultural and social influences on pain and pain management- Manage/alleviate anxiety- Utilize distraction and/or relaxation techniques- Monitor for opioid side effects- Notify MD/LIP if interventions unsuccessful or patient reports new pain- Anticipate increased pain with activity and pre-medicate as appropriate  Outcome: Adequate for Discharge     Problem: RESPIRATORY - ADULT  Goal: Achieves optimal ventilation and oxygenation  Description: INTERVENTIONS:- Assess for changes in respiratory status- Assess for changes in mentation and behavior- Position to facilitate oxygenation and minimize respiratory effort- Oxygen supplementation based on oxygen saturation or ABGs- Provide Smoking Cessation  handout, if applicable- Encourage broncho-pulmonary hygiene including cough, deep breathe, Incentive Spirometry- Assess the need for suctioning and perform as needed- Assess and instruct to report SOB or any respiratory difficulty- Respiratory Therapy support as indicated- Manage/alleviate anxiety- Monitor for signs/symptoms of CO2 retention  Outcome: Adequate for Discharge     Problem: DISCHARGE PLANNING  Goal: Discharge to home or other facility with appropriate resources  Description: INTERVENTIONS:- Identify barriers to discharge w/pt and caregiver- Include patient/family/discharge partner in discharge planning- Arrange for needed discharge resources and transportation as appropriate- Identify discharge learning needs (meds, wound care, etc)- Arrange for interpreters to assist at discharge as needed- Consider post-discharge preferences of patient/family/discharge partner- Complete POLST form as appropriate- Assess patient's ability to be responsible for managing their own health- Refer to Case Management Department for coordinating discharge planning if the patient needs post-hospital services based on physician/LIP order or complex needs related to functional status, cognitive ability or social support system  Outcome: Adequate for Discharge     Problem: HEMATOLOGIC - ADULT  Goal: Maintains hematologic stability  Description: INTERVENTIONS- Assess for signs and symptoms of bleeding or hemorrhage- Monitor labs and vital signs for trends- Administer supportive blood products/factors, fluids and medications as ordered and appropriate- Administer supportive blood products/factors as ordered and appropriate  Outcome: Adequate for Discharge     Problem: METABOLIC/FLUID AND ELECTROLYTES - ADULT  Goal: Glucose maintained within prescribed range  Description: INTERVENTIONS:- Monitor Blood Glucose as ordered- Assess for signs and symptoms of hyperglycemia and hypoglycemia- Administer ordered medications to maintain  glucose within target range- Assess barriers to adequate nutritional intake and initiate nutrition consult as needed- Instruct patient on self management of diabetes  Outcome: Adequate for Discharge

## 2025-05-05 NOTE — PROGRESS NOTES
Patient requested we talk to her daughter to schedule appointments    [facility]  [address]  [place] [address]  [phone]  [address]    Dr Heike Sorto  Internal Medicine  [address], [place] [id number] [phone]  Left Voicemail to call [phone] to assist w/scheduling apt

## 2025-05-05 NOTE — DISCHARGE SUMMARY
[facility]  part of [facility]    Discharge Summary    Chester Bautista Patient Status:  Inpatient    1940 MRN [id number]   Location [facility] 5SW/SE Attending Pérez Aguilera MD   Hosp Day # 6 PCP Heike Sorto MD     Date of Admission: 2025     Date of Discharge: 25      Lace+ Score: 80  59-90 High Risk  29-58 Medium Risk  0-28   Low Risk.    TCM Follow-Up Recommendation:  LACE > 58: High Risk of readmission after discharge from the hospital.    DISCHARGE DX: Principal Problem:    Acute hypoxic respiratory failure (HCC)  Active Problems:    COPD (chronic obstructive pulmonary disease) (HCC)    Community acquired pneumonia, unspecified laterality    Acute on chronic anemia       The patient was seen and examined on day of discharge and this discharge summary is in conjunction with any daily progress note from day of discharge.    HPI per admitting physician: \"The patient is an 85-year-old  female with known underlying chronic obstructive pulmonary disease, presented to the emergency department for evaluation of progressive shortness of breath and low oxygen saturation at home per her family with increased cough. Pulse ox 84% on upon arrival. CBC showed hemoglobin of 8.2, slightly below her baseline. BUN and creatinine of 14 and 1.22. GFR is 43, which is at baseline. Magnesium 1.6. GeneXpert viral panel was negative. ProBNP 1200. Blood cultures were obtained. Lactic acid 0.9. Chest x-ray showed mixed patchy bilateral lung opacities, mildly increased since prior study which may reflect acute infection or edema. The patient was started on IV antibiotics, Solu-Medrol, nebulizer treatments, and she will be admitted to the hospital for further management. \"    Hospital Course:        Acute hypoxic respiratory failure (HCC)  -Patient presenting with increased shortness of breath with cough  -Noted to be hypoxic requiring supplemental O2,  weaning as able   -Patient with history of COPD, ILD   -Chest x-ray reviewed.  Noted mixed patchy bilateral lung opacities.  - Concern for possible acute COPD exacerbation   -Unable to rule out community acquired pneumonia  - Completing tapering course of steroids,  - Continue scheduled inhalers.  - Completed course of empiric antibiotics  - Symptom relief as able  - Pulmonology on consult, f/u as outpt     Acute on chronic anemia  Possible GI bleed  - Patient noted to have persistent slow downtrend in hemoglobin  - Patient with history of AVMs  - Patient did note intermittent dark black stools.    - Hemoglobin decreased to 6.6 . status post 1 unit PRBC with good response.  -fecal occult stool heme positive   -No further signs of dark black stools.  - GI consulted, initially started patient on octreotide.  Not recommending further evaluation with procedures at this time.  F/u as outpt     HTN  - Elevations noted  - Continuing Coreg and increased dosing of losartan  -Added TID hydralazine  - PCP to monitor and adjust as needed     Acute Kidney Injury   -Improving   - Avoiding Nephrotoxic agents  - Cont to monitor     Type 2 DM   -With intermittent hyperglycemia  -Likely worsened due to steroid use as above  -Had episode of hypoglycemia on 5/2.  - Continue Tresiba 16 units on dc.  - Monitoring Blood glucose with POC checks  - PCP to monitor and adjust agents as needed.      History of CVA  - Continue home regimen    Physical Exam:    Vitals:    05/05/25 0300 05/05/25 0614 05/05/25 0922 05/05/25 1306   BP:  (!) 175/70 (!) 174/75 (!) 167/74   BP Location:  Left arm Left arm Left arm   Pulse: 81 104 84 79   Resp:  18 18 16   Temp:  98.5 °F (36.9 °C) 98.3 °F (36.8 °C)    TempSrc:  Oral Oral    SpO2:  92% 93% 96%   Weight:         No data found.    Intake/Output Summary (Last 24 hours) at 5/5/2025 1800  Last data filed at 5/5/2025 1600  Gross per 24 hour   Intake 480 ml   Output --   Net 480 ml       GENERAL:  Awake and  alert, in no acute distress.  HEART:  Regular rhythm, regular rate  LUNGS:  Air entry was minimally decreased.  No increased work of breathing.   ABDOMEN: Soft and non-tender.    PSYCHIATRIC: Normal mood    CULTURE:   Hospital Encounter on 04/29/25   1. Blood Culture     Status: None    Collection Time: 04/29/25  1:01 PM    Specimen: Blood,peripheral   Result Value Ref Range    Blood Culture Result No Growth 5 Days N/A       IMAGING STUDIES: SOME MAY NEED FOLLOW UP WITH PCP   XR CHEST AP PORTABLE  (CPT=71045)  Result Date: 4/29/2025  CONCLUSION:   Mixed patchy bilateral lung opacity mildly increased since the prior study which may relate to acute infection or edema superimposed on chronic changes.     Dictated by (CST): Gene Austin MD on 4/29/2025 at 11:56 AM     Finalized by (CST): Gene Austin MD on 4/29/2025 at 11:59 AM            LABS :     Lab Results   Component Value Date    WBC 11.0 05/05/2025    HGB 8.9 (L) 05/05/2025    HCT 28.4 (L) 05/05/2025    .0 05/05/2025    CREATSERUM 0.96 05/05/2025    BUN 30 (H) 05/05/2025     05/05/2025    K 3.9 05/05/2025     05/05/2025    CO2 27.0 05/05/2025     (H) 05/05/2025    CA 8.8 05/05/2025    ALB 3.3 04/29/2025    ALKPHO 67 04/29/2025    BILT 0.3 04/29/2025    TP 6.3 04/29/2025    AST 30 04/29/2025    ALT 9 (L) 04/29/2025    PTT 51.3 (H) 01/30/2025    INR 1.04 01/25/2025    T4F 1.2 01/17/2025    TSH 0.418 (L) 01/17/2025    DDIMER 2.77 (H) 01/25/2025    ESRML 56 (H) 03/13/2025    CRP 1.60 (H) 03/13/2025    MG 2.0 05/01/2025    PHOS 4.0 01/22/2025    B12 1,058 (H) 02/04/2025       Recent Labs   Lab 05/03/25  0530 05/03/25  1334 05/04/25  0543 05/05/25  0615   RBC 2.70*  --  3.26* 3.34*   HGB 6.6* 8.7* 8.8* 8.9*   HCT 23.0* 28.8* 28.7* 28.4*   MCV 85.2  --  88.0 85.0   MCH 24.4*  --  27.0 26.6   MCHC 28.7*  --  30.7* 31.3   RDW 20.0*  --  18.6* 19.5*   NEPRELIM 8.05*  --  8.96* 8.41*   WBC 10.0  --  11.2* 11.0   .0  --  262.0  320.0     Recent Labs   Lab 04/29/25  1103 04/30/25  0640 05/03/25  0530 05/04/25  0543 05/05/25  0636   *   < > 155* 226* 210*   BUN 14   < > 30* 23 30*   CREATSERUM 1.22*   < > 0.94 0.88 0.96   CA 8.2*   < > 8.6* 8.6* 8.8   ALB 3.3  --   --   --   --       < > 142 140 140   K 3.1*   < > 3.7 3.8 3.9      < > 106 105 104   CO2 26.0   < > 29.0 28.0 27.0   ALKPHO 67  --   --   --   --    AST 30  --   --   --   --    ALT 9*  --   --   --   --    BILT 0.3  --   --   --   --    TP 6.3  --   --   --   --     < > = values in this interval not displayed.     Lab Results   Component Value Date    INR 1.04 01/25/2025    INR 0.94 01/17/2025    INR 0.93 12/14/2023       Disposition: Discharge to Home    Condition at Discharge: Stable     Discharge Medications:      Discharge Medications        START taking these medications        Instructions Prescription details   benzonatate 200 MG Caps  Commonly known as: Tessalon      Take 1 capsule (200 mg total) by mouth 3 (three) times daily as needed for cough.   Quantity: 15 capsule  Refills: 0     hydrALAZINE 25 MG Tabs  Commonly known as: Apresoline      Take 1 tablet (25 mg total) by mouth every 8 (eight) hours.   Stop taking on: June 4, 2025  Quantity: 90 tablet  Refills: 0            CHANGE how you take these medications        Instructions Prescription details   acetaminophen 325 MG Tabs  Commonly known as: Tylenol  What changed: Another medication with the same name was removed. Continue taking this medication, and follow the directions you see here.      Take 2 tablets (650 mg total) by mouth every 6 (six) hours as needed.   Refills: 0     albuterol 108 (90 Base) MCG/ACT Aers  Commonly known as: Ventolin HFA  What changed: Another medication with the same name was removed. Continue taking this medication, and follow the directions you see here.      TAKE 2 PUFFS BY MOUTH EVERY 4 TO 6 HOURS AS NEEDED   Quantity: 6.7 each  Refills: 5     fluticasone-salmeterol  250-50 MCG/ACT Aepb  Commonly known as: Advair Diskus  What changed: when to take this      Inhale 1 puff into the lungs 2 (two) times daily.   Stop taking on: Zulema 3, 2025  Quantity: 60 each  Refills: 0     Lantus SoloStar 100 UNIT/ML Sopn  Generic drug: insulin glargine  What changed: how much to take      Inject 16 Units into the skin every morning.   Refills: 0     losartan 100 MG Tabs  Commonly known as: Cozaar  What changed:   medication strength  how much to take      Take 1 tablet (100 mg total) by mouth daily.   Stop taking on: June 4, 2025  Quantity: 30 tablet  Refills: 0     predniSONE 5 MG Tabs  Commonly known as: Deltasone  What changed: Another medication with the same name was added. Make sure you understand how and when to take each.      Take 1 tablet (5 mg total) by mouth daily.   Quantity: 90 tablet  Refills: 1     predniSONE 20 MG Tabs  Commonly known as: Deltasone  Start taking on: May 5, 2025  What changed: You were already taking a medication with the same name, and this prescription was added. Make sure you understand how and when to take each.      Take 1 tablet (20 mg total) by mouth daily with breakfast for 2 days, THEN 0.5 tablets (10 mg total) daily with breakfast for 2 days.   Stop taking on: May 9, 2025  Quantity: 3 tablet  Refills: 0     rOPINIRole 0.25 MG Tabs  Commonly known as: Requip  What changed: when to take this      Take 1 tablet (0.25 mg total) by mouth every evening.   Quantity: 30 tablet  Refills: 1     rosuvastatin 20 MG Tabs  Commonly known as: Crestor  What changed: Another medication with the same name was added. Make sure you understand how and when to take each.      Take 1 tablet (20 mg total) by mouth nightly.   Quantity: 90 tablet  Refills: 3     rosuvastatin 10 MG Tabs  Commonly known as: Crestor  What changed: You were already taking a medication with the same name, and this prescription was added. Make sure you understand how and when to take each.      Take 1  tablet (10 mg total) by mouth nightly.   Quantity: 90 tablet  Refills: 3            CONTINUE taking these medications        Instructions Prescription details   aspirin 81 MG Tbec  Commonly known as: Aspirin Low Dose      Take 1 tablet (81 mg total) by mouth in the morning.   Quantity: 90 tablet  Refills: 3     BD Pen Needle Tata 2nd Gen 32G X 4 MM Misc  Generic drug: Insulin Pen Needle      1 EACH DAILY. USE DAILY WITH INSULIN   Quantity: 100 each  Refills: 1     Blood Gluc Meter Disp-Strips Nica      Dx. E11.9. Checks qam.   Quantity: 100 each  Refills: 0     Breo Ellipta 100-25 MCG/ACT Aepb  Generic drug: fluticasone furoate-vilanterol      Inhale 1 puff into the lungs daily.   Refills: 0     carvedilol 25 MG Tabs  Commonly known as: Coreg      Take 1 tablet (25 mg total) by mouth 2 (two) times daily with meals.   Quantity: 60 tablet  Refills: 1     gabapentin 100 MG Caps  Commonly known as: Neurontin      Take 1 capsule (100 mg total) by mouth nightly.   Refills: 0     glipiZIDE 10 MG Tabs  Commonly known as: Glucotrol      Take 1 tablet (10 mg total) by mouth daily with breakfast.   Quantity: 90 tablet  Refills: 1     insulin lispro 100 UNIT/ML Soln  Commonly known as: Humalog      Inject 3 Units into the skin 3 (three) times daily before meals. Sliding scale depending on blood sugar   Refills: 0     ipratropium 0.02 % Soln  Commonly known as: Atrovent      Take 2.5 mL (500 mcg total) by nebulization 2 (two) times daily.   Quantity: 187.5 mL  Refills: 2     lidocaine-menthol 4-1 % Ptch      Place 1 patch onto the skin daily.   Refills: 0     magnesium oxide 400 MG Tabs  Commonly known as: Mag-Ox      Take 1 tablet (400 mg total) by mouth 2 (two) times daily. Per Dr. JENSEN Mata   Refills: 0     montelukast 10 MG Tabs  Commonly known as: Singulair      Take 1 tablet (10 mg total) by mouth nightly.   Quantity: 90 tablet  Refills: 3     OneTouch Delica Lancets 33G Misc      4 x daily   Quantity: 100 each  Refills: 1      Accu-Chek FastClix Lancets Misc      Check sugar once daily as directed (E11.42)   Quantity: 100 each  Refills: 2     OneTouch Verio w/Device Kit      1 each daily. Test 1x daily   Quantity: 1 kit  Refills: 0     Blood Glucose System Adrian Kit      Dx. E11.9. Checks qam.   Quantity: 1 kit  Refills: 0     Blood Glucose Monitoring Suppl w/Device Kit      Dx. E11.9. Checks qam.   Quantity: 1 kit  Refills: 0     pantoprazole 40 MG Tbec  Commonly known as: Protonix      Take 1 tablet (40 mg total) by mouth in the morning and 1 tablet (40 mg total) in the evening. Take before meals.   Quantity: 60 tablet  Refills: 0     sennosides 8.6 MG Tabs  Commonly known as: Senokot      Take 1 tablet (8.6 mg total) by mouth 2 (two) times daily.   Refills: 0            STOP taking these medications      Atogepant 30 MG Tabs        Ferrous Sulfate 325 (65 Fe) MG Tabs        Gabapentin Enacarbil  MG Tbcr        hydroxychloroquine 200 MG Tabs  Commonly known as: Plaquenil        loratadine 10 MG Tabs  Commonly known as: Claritin        meclizine 12.5 MG Tabs  Commonly known as: Antivert        sodium chloride 0.9% SOLN 100 mL with ferric gluconate 12.5 mg/mL SOLN 250 mg        Vitamin D3 1.25 MG (00734 UT) Caps                  Where to Get Your Medications        These medications were sent to [facility]/pharmacy #[id number] - [place], [place] - [address] [phone], [phone]  [address], [facility] [address]      Phone: [phone]   aspirin 81 MG Tbec  benzonatate 200 MG Caps  carvedilol 25 MG Tabs  fluticasone-salmeterol 250-50 MCG/ACT Aepb  hydrALAZINE 25 MG Tabs  losartan 100 MG Tabs  pantoprazole 40 MG Tbec  predniSONE 20 MG Tabs  rOPINIRole 0.25 MG Tabs  rosuvastatin 10 MG Tabs         Follow up Visits  [facility]  [address] [address] [phone]  Schedule an appointment as soon as possible for a visit      Heike Sorto MD  [address]  [facility] [address]  [phone]    Schedule an appointment as soon as possible for a visit in 1 week(s)      Heike Sorto MD    Consultants         Provider   Role Specialty     Zoila Mcqueen MD      Consulting Physician GASTROENTEROLOGY     Damon Williamson DO      Consulting Physician PULMONARY DISEASES              Other Discharge Instructions:       ----------------------------------------------------  42 MIN SPENT ON THIS DC   Pérez Aguilera MD    5/5/2025

## 2025-05-06 ENCOUNTER — TELEPHONE (OUTPATIENT)
Dept: INTERNAL MEDICINE CLINIC | Facility: CLINIC | Age: 85
End: 2025-05-06

## 2025-05-06 ENCOUNTER — TELEPHONE (OUTPATIENT)
Dept: PULMONOLOGY | Facility: CLINIC | Age: 85
End: 2025-05-06

## 2025-05-06 ENCOUNTER — PATIENT OUTREACH (OUTPATIENT)
Dept: CASE MANAGEMENT | Age: 85
End: 2025-05-06

## 2025-05-06 NOTE — PAYOR COMM NOTE
--------------  DISCHARGE REVIEW    Payor: TRINO ARREGUIN O  Subscriber #:  [id number]  Authorization Number: [id number]    Admit date: 25  Admit time:   2:26 PM  Discharge Date: 2025  6:30 PM     Admitting Physician: Raegan Manzo MD  Attending Physician:  No att. providers found  Primary Care Physician: Heike Sorto MD          Discharge Summary Notes        Discharge Summary signed by Pérez Aguilera MD at 2025  9:13 AM       Author: Pérez Aguilera MD Specialty: HOSPITALIST, Internal Medicine Author Type: Physician    Filed: 2025  9:13 AM Date of Service: 2025  6:00 PM Status: Signed    : Pérez Aguilera MD (Physician)         [facility]  part of [facility]    Discharge Summary    Chester Bautista Patient Status:  Inpatient    1940 MRN [id number]   Location [facility] 5SW/SE Attending Pérez Aguilera MD   Hosp Day # 6 PCP Heike Sorto MD     Date of Admission: 2025     Date of Discharge: 25  Home    Lace+ Score: 80  59-90 High Risk  29-58 Medium Risk  0-28   Low Risk.    TCM Follow-Up Recommendation:  LACE > 58: High Risk of readmission after discharge from the hospital.    DISCHARGE DX: Principal Problem:    Acute hypoxic respiratory failure (HCC)  Active Problems:    COPD (chronic obstructive pulmonary disease) (HCC)    Community acquired pneumonia, unspecified laterality    Acute on chronic anemia       The patient was seen and examined on day of discharge and this discharge summary is in conjunction with any daily progress note from day of discharge.    HPI per admitting physician: \"The patient is an 85-year-old  female with known underlying chronic obstructive pulmonary disease, presented to the emergency department for evaluation of progressive shortness of breath and low oxygen saturation at home per her family with increased cough. Pulse ox 84% on upon arrival. CBC showed hemoglobin  of 8.2, slightly below her baseline. BUN and creatinine of 14 and 1.22. GFR is 43, which is at baseline. Magnesium 1.6. GeneXpert viral panel was negative. ProBNP 1200. Blood cultures were obtained. Lactic acid 0.9. Chest x-ray showed mixed patchy bilateral lung opacities, mildly increased since prior study which may reflect acute infection or edema. The patient was started on IV antibiotics, Solu-Medrol, nebulizer treatments, and she will be admitted to the hospital for further management. \"    Hospital Course:        Acute hypoxic respiratory failure (HCC)  -Patient presenting with increased shortness of breath with cough  -Noted to be hypoxic requiring supplemental O2, weaning as able   -Patient with history of COPD, ILD   -Chest x-ray reviewed.  Noted mixed patchy bilateral lung opacities.  - Concern for possible acute COPD exacerbation   -Unable to rule out community acquired pneumonia  - Completing tapering course of steroids,  - Continue scheduled inhalers.  - Completed course of empiric antibiotics  - Symptom relief as able  - Pulmonology on consult, f/u as outpt     Acute on chronic anemia  Possible GI bleed  - Patient noted to have persistent slow downtrend in hemoglobin  - Patient with history of AVMs  - Patient did note intermittent dark black stools.    - Hemoglobin decreased to 6.6 . status post 1 unit PRBC with good response.  -fecal occult stool heme positive   -No further signs of dark black stools.  - GI consulted, initially started patient on octreotide.  Not recommending further evaluation with procedures at this time.  F/u as outpt     HTN  - Elevations noted  - Continuing Coreg and increased dosing of losartan  -Added TID hydralazine  - PCP to monitor and adjust as needed     Acute Kidney Injury   -Improving   - Avoiding Nephrotoxic agents  - Cont to monitor     Type 2 DM   -With intermittent hyperglycemia  -Likely worsened due to steroid use as above  -Had episode of hypoglycemia on 5/2.  -  Continue Tresiba 16 units on dc.  - Monitoring Blood glucose with POC checks  - PCP to monitor and adjust agents as needed.      History of CVA  - Continue home regimen    Physical Exam:    Vitals:    05/05/25 0300 05/05/25 0614 05/05/25 0922 05/05/25 1306   BP:  (!) 175/70 (!) 174/75 (!) 167/74   BP Location:  Left arm Left arm Left arm   Pulse: 81 104 84 79   Resp:  18 18 16   Temp:  98.5 °F (36.9 °C) 98.3 °F (36.8 °C)    TempSrc:  Oral Oral    SpO2:  92% 93% 96%   Weight:         No data found.    Intake/Output Summary (Last 24 hours) at 5/5/2025 1800  Last data filed at 5/5/2025 1600  Gross per 24 hour   Intake 480 ml   Output --   Net 480 ml       GENERAL:  Awake and alert, in no acute distress.  HEART:  Regular rhythm, regular rate  LUNGS:  Air entry was minimally decreased.  No increased work of breathing.   ABDOMEN: Soft and non-tender.    PSYCHIATRIC: Normal mood    CULTURE:   Hospital Encounter on 04/29/25   1. Blood Culture     Status: None    Collection Time: 04/29/25  1:01 PM    Specimen: Blood,peripheral   Result Value Ref Range    Blood Culture Result No Growth 5 Days N/A       IMAGING STUDIES: SOME MAY NEED FOLLOW UP WITH PCP   XR CHEST AP PORTABLE  (CPT=71045)  Result Date: 4/29/2025  CONCLUSION:   Mixed patchy bilateral lung opacity mildly increased since the prior study which may relate to acute infection or edema superimposed on chronic changes.     Dictated by (CST): Gene Austin MD on 4/29/2025 at 11:56 AM     Finalized by (CST): Gene Austin MD on 4/29/2025 at 11:59 AM            LABS :     Lab Results   Component Value Date    WBC 11.0 05/05/2025    HGB 8.9 (L) 05/05/2025    HCT 28.4 (L) 05/05/2025    .0 05/05/2025    CREATSERUM 0.96 05/05/2025    BUN 30 (H) 05/05/2025     05/05/2025    K 3.9 05/05/2025     05/05/2025    CO2 27.0 05/05/2025     (H) 05/05/2025    CA 8.8 05/05/2025    ALB 3.3 04/29/2025    ALKPHO 67 04/29/2025    BILT 0.3 04/29/2025    TP 6.3  04/29/2025    AST 30 04/29/2025    ALT 9 (L) 04/29/2025    PTT 51.3 (H) 01/30/2025    INR 1.04 01/25/2025    T4F 1.2 01/17/2025    TSH 0.418 (L) 01/17/2025    DDIMER 2.77 (H) 01/25/2025    ESRML 56 (H) 03/13/2025    CRP 1.60 (H) 03/13/2025    MG 2.0 05/01/2025    PHOS 4.0 01/22/2025    B12 1,058 (H) 02/04/2025       Recent Labs   Lab 05/03/25  0530 05/03/25  1334 05/04/25  0543 05/05/25  0615   RBC 2.70*  --  3.26* 3.34*   HGB 6.6* 8.7* 8.8* 8.9*   HCT 23.0* 28.8* 28.7* 28.4*   MCV 85.2  --  88.0 85.0   MCH 24.4*  --  27.0 26.6   MCHC 28.7*  --  30.7* 31.3   RDW 20.0*  --  18.6* 19.5*   NEPRELIM 8.05*  --  8.96* 8.41*   WBC 10.0  --  11.2* 11.0   .0  --  262.0 320.0     Recent Labs   Lab 04/29/25  1103 04/30/25  0640 05/03/25  0530 05/04/25  0543 05/05/25  0636   *   < > 155* 226* 210*   BUN 14   < > 30* 23 30*   CREATSERUM 1.22*   < > 0.94 0.88 0.96   CA 8.2*   < > 8.6* 8.6* 8.8   ALB 3.3  --   --   --   --       < > 142 140 140   K 3.1*   < > 3.7 3.8 3.9      < > 106 105 104   CO2 26.0   < > 29.0 28.0 27.0   ALKPHO 67  --   --   --   --    AST 30  --   --   --   --    ALT 9*  --   --   --   --    BILT 0.3  --   --   --   --    TP 6.3  --   --   --   --     < > = values in this interval not displayed.     Lab Results   Component Value Date    INR 1.04 01/25/2025    INR 0.94 01/17/2025    INR 0.93 12/14/2023       Disposition: Discharge to Home    Condition at Discharge: Stable     Discharge Medications:      Discharge Medications        START taking these medications        Instructions Prescription details   benzonatate 200 MG Caps  Commonly known as: Tessalon      Take 1 capsule (200 mg total) by mouth 3 (three) times daily as needed for cough.   Quantity: 15 capsule  Refills: 0     hydrALAZINE 25 MG Tabs  Commonly known as: Apresoline      Take 1 tablet (25 mg total) by mouth every 8 (eight) hours.   Stop taking on: June 4, 2025  Quantity: 90 tablet  Refills: 0            CHANGE how you  take these medications        Instructions Prescription details   acetaminophen 325 MG Tabs  Commonly known as: Tylenol  What changed: Another medication with the same name was removed. Continue taking this medication, and follow the directions you see here.      Take 2 tablets (650 mg total) by mouth every 6 (six) hours as needed.   Refills: 0     albuterol 108 (90 Base) MCG/ACT Aers  Commonly known as: Ventolin HFA  What changed: Another medication with the same name was removed. Continue taking this medication, and follow the directions you see here.      TAKE 2 PUFFS BY MOUTH EVERY 4 TO 6 HOURS AS NEEDED   Quantity: 6.7 each  Refills: 5     fluticasone-salmeterol 250-50 MCG/ACT Aepb  Commonly known as: Advair Diskus  What changed: when to take this      Inhale 1 puff into the lungs 2 (two) times daily.   Stop taking on: Zulema 3, 2025  Quantity: 60 each  Refills: 0     Lantus SoloStar 100 UNIT/ML Sopn  Generic drug: insulin glargine  What changed: how much to take      Inject 16 Units into the skin every morning.   Refills: 0     losartan 100 MG Tabs  Commonly known as: Cozaar  What changed:   medication strength  how much to take      Take 1 tablet (100 mg total) by mouth daily.   Stop taking on: June 4, 2025  Quantity: 30 tablet  Refills: 0     predniSONE 5 MG Tabs  Commonly known as: Deltasone  What changed: Another medication with the same name was added. Make sure you understand how and when to take each.      Take 1 tablet (5 mg total) by mouth daily.   Quantity: 90 tablet  Refills: 1     predniSONE 20 MG Tabs  Commonly known as: Deltasone  Start taking on: May 5, 2025  What changed: You were already taking a medication with the same name, and this prescription was added. Make sure you understand how and when to take each.      Take 1 tablet (20 mg total) by mouth daily with breakfast for 2 days, THEN 0.5 tablets (10 mg total) daily with breakfast for 2 days.   Stop taking on: May 9, 2025  Quantity: 3  tablet  Refills: 0     rOPINIRole 0.25 MG Tabs  Commonly known as: Requip  What changed: when to take this      Take 1 tablet (0.25 mg total) by mouth every evening.   Quantity: 30 tablet  Refills: 1     rosuvastatin 20 MG Tabs  Commonly known as: Crestor  What changed: Another medication with the same name was added. Make sure you understand how and when to take each.      Take 1 tablet (20 mg total) by mouth nightly.   Quantity: 90 tablet  Refills: 3     rosuvastatin 10 MG Tabs  Commonly known as: Crestor  What changed: You were already taking a medication with the same name, and this prescription was added. Make sure you understand how and when to take each.      Take 1 tablet (10 mg total) by mouth nightly.   Quantity: 90 tablet  Refills: 3            CONTINUE taking these medications        Instructions Prescription details   aspirin 81 MG Tbec  Commonly known as: Aspirin Low Dose      Take 1 tablet (81 mg total) by mouth in the morning.   Quantity: 90 tablet  Refills: 3     BD Pen Needle Tata 2nd Gen 32G X 4 MM Misc  Generic drug: Insulin Pen Needle      1 EACH DAILY. USE DAILY WITH INSULIN   Quantity: 100 each  Refills: 1     Blood Gluc Meter Disp-Strips Nica      Dx. E11.9. Checks qam.   Quantity: 100 each  Refills: 0     Breo Ellipta 100-25 MCG/ACT Aepb  Generic drug: fluticasone furoate-vilanterol      Inhale 1 puff into the lungs daily.   Refills: 0     carvedilol 25 MG Tabs  Commonly known as: Coreg      Take 1 tablet (25 mg total) by mouth 2 (two) times daily with meals.   Quantity: 60 tablet  Refills: 1     gabapentin 100 MG Caps  Commonly known as: Neurontin      Take 1 capsule (100 mg total) by mouth nightly.   Refills: 0     glipiZIDE 10 MG Tabs  Commonly known as: Glucotrol      Take 1 tablet (10 mg total) by mouth daily with breakfast.   Quantity: 90 tablet  Refills: 1     insulin lispro 100 UNIT/ML Soln  Commonly known as: Humalog      Inject 3 Units into the skin 3 (three) times daily before  meals. Sliding scale depending on blood sugar   Refills: 0     ipratropium 0.02 % Soln  Commonly known as: Atrovent      Take 2.5 mL (500 mcg total) by nebulization 2 (two) times daily.   Quantity: 187.5 mL  Refills: 2     lidocaine-menthol 4-1 % Ptch      Place 1 patch onto the skin daily.   Refills: 0     magnesium oxide 400 MG Tabs  Commonly known as: Mag-Ox      Take 1 tablet (400 mg total) by mouth 2 (two) times daily. Per Dr. JENSEN Mata   Refills: 0     montelukast 10 MG Tabs  Commonly known as: Singulair      Take 1 tablet (10 mg total) by mouth nightly.   Quantity: 90 tablet  Refills: 3     OneTouch Delica Lancets 33G Misc      4 x daily   Quantity: 100 each  Refills: 1     Accu-Chek FastClix Lancets Misc      Check sugar once daily as directed (E11.42)   Quantity: 100 each  Refills: 2     OneTouch Verio w/Device Kit      1 each daily. Test 1x daily   Quantity: 1 kit  Refills: 0     Blood Glucose System Adrian Kit      Dx. E11.9. Checks qam.   Quantity: 1 kit  Refills: 0     Blood Glucose Monitoring Suppl w/Device Kit      Dx. E11.9. Checks qam.   Quantity: 1 kit  Refills: 0     pantoprazole 40 MG Tbec  Commonly known as: Protonix      Take 1 tablet (40 mg total) by mouth in the morning and 1 tablet (40 mg total) in the evening. Take before meals.   Quantity: 60 tablet  Refills: 0     sennosides 8.6 MG Tabs  Commonly known as: Senokot      Take 1 tablet (8.6 mg total) by mouth 2 (two) times daily.   Refills: 0            STOP taking these medications      Atogepant 30 MG Tabs        Ferrous Sulfate 325 (65 Fe) MG Tabs        Gabapentin Enacarbil  MG Tbcr        hydroxychloroquine 200 MG Tabs  Commonly known as: Plaquenil        loratadine 10 MG Tabs  Commonly known as: Claritin        meclizine 12.5 MG Tabs  Commonly known as: Antivert        sodium chloride 0.9% SOLN 100 mL with ferric gluconate 12.5 mg/mL SOLN 250 mg        Vitamin D3 1.25 MG (35456 UT) Caps                  Where to Get Your Medications         These medications were sent to [facility]/pharmacy #[id number] - [place], [place] - [address] [phone], [phone]  [address], [place] [facility] [address]      Phone: [phone]   aspirin 81 MG Tbec  benzonatate 200 MG Caps  carvedilol 25 MG Tabs  fluticasone-salmeterol 250-50 MCG/ACT Aepb  hydrALAZINE 25 MG Tabs  losartan 100 MG Tabs  pantoprazole 40 MG Tbec  predniSONE 20 MG Tabs  rOPINIRole 0.25 MG Tabs  rosuvastatin 10 MG Tabs         Follow up Visits  [facility]  [address]  [facility] [id number] [phone]  Schedule an appointment as soon as possible for a visit      Heike Sorto MD  [address] [address]  [phone]    Schedule an appointment as soon as possible for a visit in 1 week(s)      Heike Sorto MD    Consultants         Provider   Role Specialty     MaZoila MD      Consulting Physician GASTROENTEROLOGY     Damon Williamson DO      Consulting Physician PULMONARY DISEASES              Other Discharge Instructions:       ----------------------------------------------------  42 MIN SPENT ON THIS DC   Pérez Aguilera MD    5/5/2025      Electronically signed by Pérez Aguilera MD on 5/6/2025  9:13 AM         REVIEWER COMMENTS

## 2025-05-06 NOTE — PROGRESS NOTES
Voicemail received; Patient's daughterBrea returning call  Called patient's daughter back    SCC-COPD appointment request (discharged 05/05)    [facility]  [address]  [address]  [place] [address] [phone]  [address]  Patient's daughter declined appointment; prefers to see Pulmonary    Dr Chapin Parker  Pulmonary Disease; Sleep Medicine  [address]  [address]  [place] [address] [phone]  Dr Parker's office will call patient's daughter, Brea, due to no availability in needed time frame     Dr Heike Sorto  Internal Medicine  [address] [place] [address] [phone]  Apt made:  Thu 05/15 @4:30pm  Confirmed w/pt's daughterBrea  Closing encounter

## 2025-05-06 NOTE — PROGRESS NOTES
Transitions of Care Navigation  Discharge Date: 25  Contact Date: 2025    Transitions of Care Assessment:  DESI Initial Assessment    General:  Assessment completed with: Children (dtr Brae)  Patient Subjective: Dtr Brea reports pt feeling better, since hospital discharge--back home, sleeping well, appetite good, tolerating medications, as prescribed. Dtr will reapply Joselyn sensor today and will monitor O2 sat and b/p.  Dtr denies pt with any fever, chills, headache, vision changes, dizziness, nausea, vomiting, diarrhea, bleeding, irregular heartbeat or fast pulse, loss of vision, speech or strength or coordination in any body part, chest pain or shortness of breath at this time.  Chief Complaint: DISCHARGE DX: Principal Problem:    Acute hypoxic respiratory failure (HCC)  Active Problems:    COPD (chronic obstructive pulmonary disease) (HCC)    Community acquired pneumonia, unspecified laterality    Acute on chronic anemia  Verify patient name and  with patient/ caregiver: Yes    Hospital Stay/Discharge:  Tell me what you understand of why you were in the hospital or emergency department: Patient presents to the ED c/o headache and SOB since last night. Hx COPD   Patient's O2 saturation 84% on room air. Patient placed on 2L nasal cannula. Pt states she does not wear O2 at home.  Prior to leaving the hospital were your Discharge Instructions reviewed with you?: Yes  Did you receive a copy of your written Discharge Instructions?: Yes  What questions do you have about your Discharge Instructions?: none  Do you feel better or worse since you left the hospital or emergency department?: Better    Follow - Up Appointment:  Do you have a follow-up appointment?: Yes  Date: 05/15/25  Physician: Dr. Sorto  Are there any barriers to getting to your follow-up appointment?: No    Home Health/DME:  Prior to leaving the hospital was Home Health (HH) arranged for you?: No     Prior to leaving the hospital or  emergency department was Durable Medical Equipment (DME), medical supplies, or infusions arranged for you?: No  Are DME/medical supply/infusions needs identified by staff during this assessment?: No     Medications/Diet:  Did any of your medications change, during or after your hospital stay or ED visit?: Yes  Do you have your new or updated medications?: Yes  Do you understand what your medications are for and possible side effects?: Yes  Are there any reasons that keep you from taking your medication as prescribed?: No  Any concerns about medication refills?: No    Were you given a different diet per your Discharge Instructions?: No  Reason: n/a     Questions/Concerns:  Do you have any questions or concerns that have not been discussed?: No     DESI Follow-up Assessment    General:  Assessment completed with: Children (lalita Guidry)  Patient Subjective: Lalita Guidry reports pt feeling better, since hospital discharge--back home, sleeping well, appetite good, tolerating medications, as prescribed. Dtr will reapply Joselyn sensor today and will monitor O2 sat and b/p.  Dtr denies pt with any fever, chills, headache, vision changes, dizziness, nausea, vomiting, diarrhea, bleeding, irregular heartbeat or fast pulse, loss of vision, speech or strength or coordination in any body part, chest pain or shortness of breath at this time.  Chief Complaint: DISCHARGE DX: Principal Problem:    Acute hypoxic respiratory failure (HCC)  Active Problems:    COPD (chronic obstructive pulmonary disease) (HCC)    Community acquired pneumonia, unspecified laterality    Acute on chronic anemia  Community Resources: Other (resume outpt PT)    Progress/Care Plan:  Is the patient progressing as planned?: Yes  Frequency/Follow Up Plan: 7 day follow up, next week     Nursing Interventions: Discussed diet, activity, medications and need for f/u visits. Dtr confirms 5/15/25 TCM/DESI appt--declines sooner appt, d/t other specialist appts and pt  to resume outpt rehab--dtr declines SCC/COPD clinic-prefers to f/u with Dr. Parker. Sent TE to office staff as FYI/DESI protocol.  Patient aware when to contact PCP/specialists and when to seek emergency care. No further questions/concerns at this time.      Medications:  Medication Reconciliation:  I am aware of an inpatient discharge within the last 30 days.  The discharge medication list has been reconciled with the patient's current medication list and reviewed by me. See medication list for additions of new medication, and changes to current doses of medications and discontinued medications.  Current Outpatient Medications   Medication Sig Dispense Refill    aspirin (ASPIRIN LOW DOSE) 81 MG Oral Tab EC Take 1 tablet (81 mg total) by mouth in the morning. 90 tablet 3    pantoprazole 40 MG Oral Tab EC Take 1 tablet (40 mg total) by mouth in the morning and 1 tablet (40 mg total) in the evening. Take before meals. 60 tablet 0    carvedilol 25 MG Oral Tab Take 1 tablet (25 mg total) by mouth 2 (two) times daily with meals. 60 tablet 1    rOPINIRole 0.25 MG Oral Tab Take 1 tablet (0.25 mg total) by mouth every evening. 30 tablet 1    rosuvastatin 10 MG Oral Tab Take 1 tablet (10 mg total) by mouth nightly. 90 tablet 3    hydrALAZINE 25 MG Oral Tab Take 1 tablet (25 mg total) by mouth every 8 (eight) hours. 90 tablet 0    fluticasone-salmeterol 250-50 MCG/ACT Inhalation Aerosol Powder, Breath Activated Inhale 1 puff into the lungs 2 (two) times daily. 60 each 0    LANTUS SOLOSTAR 100 UNIT/ML Subcutaneous Solution Pen-injector Inject 16 Units into the skin every morning.      losartan 100 MG Oral Tab Take 1 tablet (100 mg total) by mouth daily. 30 tablet 0    predniSONE 20 MG Oral Tab Take 1 tablet (20 mg total) by mouth daily with breakfast for 2 days, THEN 0.5 tablets (10 mg total) daily with breakfast for 2 days. 3 tablet 0    benzonatate 200 MG Oral Cap Take 1 capsule (200 mg total) by mouth 3 (three) times daily as  needed for cough. 15 capsule 0    fluticasone furoate-vilanterol (BREO ELLIPTA) 100-25 MCG/ACT Inhalation Aerosol Powder, Breath Activated Inhale 1 puff into the lungs daily.      gabapentin 100 MG Oral Cap Take 1 capsule (100 mg total) by mouth nightly.      glipiZIDE 10 MG Oral Tab Take 1 tablet (10 mg total) by mouth daily with breakfast. 90 tablet 1    insulin lispro 100 UNIT/ML Injection Solution Inject 3 Units into the skin 3 (three) times daily before meals. Sliding scale depending on blood sugar      ipratropium 0.02 % Inhalation Solution Take 2.5 mL (500 mcg total) by nebulization 2 (two) times daily. 187.5 mL 2    sennosides 8.6 MG Oral Tab Take 1 tablet (8.6 mg total) by mouth 2 (two) times daily.      lidocaine-menthol 4-1 % External Patch Place 1 patch onto the skin daily.      acetaminophen 325 MG Oral Tab Take 2 tablets (650 mg total) by mouth every 6 (six) hours as needed.      rosuvastatin 20 MG Oral Tab Take 1 tablet (20 mg total) by mouth nightly. (Patient taking differently: Take 0.5 tablets (10 mg total) by mouth nightly.) 90 tablet 3    magnesium oxide 400 MG Oral Tab Take 1 tablet (400 mg total) by mouth 2 (two) times daily. Per Dr. JENSEN Mata      predniSONE 5 MG Oral Tab Take 1 tablet (5 mg total) by mouth daily. 90 tablet 1    albuterol 108 (90 Base) MCG/ACT Inhalation Aero Soln TAKE 2 PUFFS BY MOUTH EVERY 4 TO 6 HOURS AS NEEDED 6.7 each 5    BD PEN NEEDLE KADEN 2ND GEN 32G X 4 MM Does not apply Misc 1 EACH DAILY. USE DAILY WITH INSULIN 100 each 1    montelukast 10 MG Oral Tab Take 1 tablet (10 mg total) by mouth nightly. 90 tablet 3    Blood Glucose Monitoring Suppl (BLOOD GLUCOSE SYSTEM GILLES) Does not apply Kit Dx. E11.9. Checks qam. 1 kit 0    Blood Glucose Monitoring Suppl w/Device Does not apply Kit Dx. E11.9. Checks qam. 1 kit 0    Blood Gluc Meter Disp-Strips Does not apply Device Dx. E11.9. Checks qam. 100 each 0    Accu-Chek FastClix Lancets Does not apply Misc Check sugar once daily as  directed (E11.42) 100 each 2    Blood Glucose Monitoring Suppl (ONETOUCH VERIO) w/Device Does not apply Kit 1 each daily. Test 1x daily 1 kit 0    OneTouch Delica Lancets 33G Does not apply Misc 4 x daily 100 each 1     START taking: benzonatate (Tessalon) hydrALAZINE (Apresoline)    CHANGE how you take: acetaminophen (Tylenol) albuterol (Ventolin HFA) fluticasone-salmeterol (Advair Diskus) Lantus SoloStar (insulin glargine) losartan (Cozaar) predniSONE (Deltasone) rOPINIRole (Requip) rosuvastatin (Crestor)   STOP taking: Atogepant 30 MG Tabs Ferrous Sulfate 325 (65 Fe) MG Tabs Gabapentin Enacarbil  MG Tbcr hydroxychloroquine 200 MG Tabs (Plaquenil) loratadine 10 MG Tabs (Claritin) meclizine 12.5 MG Tabs (Antivert) sodium chloride 0.9% SOLN 100 mL with ferric gluconate 12.5 mg/mL SOLN 250 mg Vitamin D3 1.25 MG (39524 UT) Caps    Diagnosis specifics:  COPD: Have you participated in a pulmonary rehab program? no   Would you like more information? no  We reviewed your medications/inhalers, how often are you using your inhalers? As prescribed  Are you currently on oxygen? no   Are you familiar with the signs and symptoms or worsening COPD? Yes  Who do you call with worsening COPD symptoms? Dr. Parker  Do you know when to call with COPD symptoms? Yes  Do you have any of the following potential risk factors for COPD in your home environment?   Primary or secondary tobacco smoke? no   Occupational dusts and chemicals organic or inorganic? no   Indoor air pollution from heating and cooking with poor ventilation? no   Mold? no   Pets? no   Extreme heat? no   Other: n/a   Pneumonia: Tell me what you understand about the signs and symptoms of pneumonia reoccurrence?  (Guide to discuss: fever, chills, shaking, chest pain, productive cough, difficulty breathing)  Comments: dtr denies any of the above symptoms--will monitor and return pt to ER, if suddenly worsens      Follow-up Appointments:    Home w/ resume [facility] [place]  Outpatient rehab       Start   Ordered   04/30/25 1527  Follow up for COPD/Pneumonia  (Post-Discharge Follow-Up Orders)  Once,   Status:  Canceled        Priority: Routine   Question Answer Comment   Patient to be seen for: COPD    Where to follow-up for COPD/Pneumonia? [facility]        04/30/25 1526         Follow-up Information    Follow up With Specialties Details Why Contact Info Additional Information   [facility] Multi-Specialty Schedule an appointment as soon as possible for a visit  [address]  [facility] [address]  [phone] Your appointment is located at [address]  in [place], [place].  Please park in the Yellow lot and go through the [place] [facility] entrance.  Then proceed to [address]. Masks are optional for all patients and visitors, unless otherwise indicated.   Heike Sorto MD Internal Medicine Schedule an appointment as soon as possible for a visit in 1 week(s)  [address] [facility] [address]  [phone]        Your appointments       Date & Time Appointment Department ([place])    May 08, 2025 2:30 PM CDT Injection - Em Cc with [facility] CC LAB1 [facility] ([facility])        May 13, 2025 4:00 PM CDT Injection with EC CFH RN RHEUMATOLOGY [facility] ([facility])        May 15, 2025 4:30 PM CDT Hospital Follow Up with Heike Sorto MD [facility] ([place] [address])        Jun 05, 2025 2:30 PM CDT Injection - Em Cc with [facility] ([facility])        Jun 06, 2025 12:00 PM CDT Injection with EC CFH RN RHEUMATOLOGY [facility] ([facility])        Jun 18, 2025 10:30 AM CDT Follow Up Visit with Heike Sorto MD [facility]  [facility] ([place] [facility])        Jul 03, 2025 2:30 PM CDT Injection - Em Cc with ELM CC LAB2 Bridget Chen [facility] [place] ([facility])        Jul 07, 2025 2:00 PM CDT Injection with EC CFH RN RHEUMATOLOGY [facility] ([facility])        Jul 10, 2025 1:45 PM CDT Follow Up Visit with Vic Thakkar APRN [facility] ([facility])        Aug 06, 2025 1:40 PM CDT Follow Up Visit with Ashia Goodrich MD [facility] ([facility])        Sep 03, 2025 1:00 PM CDT Follow Up Visit with Mikey Garcia MD [facility])        Sep 18, 2025 12:15 PM CDT Follow Up Visit with Chapin Parker MD [facility] ([facility])              [facility] [place]  [facility]  [address]  [place] [address]  [phone] [facility] [place]  [facility]  [address]  [place] [address]  [phone] [facility] [place]  [facility]  [address]  [place] [address]  [phone]    [facility]  [address] [place] [address]  [phone] [facility]  [place] [facility] [address]  [address]  [place] [id number] [phone] [facility]  [address]  [place] [address]  [phone]    Bridget LOPEZ Chen [facility]  [address]  [facility]  [address]  [phone]          Transitional Care Clinic  Was TCC Ordered: No    Primary Care Provider (If no TCC appointment)  Does patient already have a PCP appointment scheduled? Yes  Nurse Care Manager Confirmed PCP office TCM/DESI appointment with patient for 5/15/25 with PCP--dtr declines sooner appt       Specialist  Does the patient have any other follow-up appointment(s) need to be scheduled? No     [x]  Patient verbally agrees to additional follow-up calls from Nurse Care Manager    Book By Date: 5/12/2025

## 2025-05-06 NOTE — PROGRESS NOTES
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

## 2025-05-06 NOTE — TELEPHONE ENCOUNTER
Patient discharged 5/5/25 from [facility]. Per Dr. Roque's progress notes 5/4/25 \"f/u with Dr. Parker in 2-3 weeks\".    Brea accepted appointment for patient with Dr. Parker 5/29/25 1:45 pm ([facility]). Appointment info given. Encouraged her to contact us if we may be of further assistance in the interim. Patient's daughter verbalized understanding.

## 2025-05-06 NOTE — TELEPHONE ENCOUNTER
Sent as FYI/DESI protocol:    Spoke with patient's dtr, Brea, for Transitions of Care call today. Dtr confirms 5/15/25 TCM/DESI appt--declines sooner appt, d/t other specialist appts and pt to resume outpt rehab--dtr declines SCC/COPD clinic-prefers to f/u with Dr. Parker.     TCM/DESI appointment needed by 5/12/25.      BOOK BY DATE: 5/19/25      DISCHARGE DX: Principal Problem:    Acute hypoxic respiratory failure (HCC)  Active Problems:    COPD (chronic obstructive pulmonary disease) (HCC)    Community acquired pneumonia, unspecified laterality    Acute on chronic anemia      Follow-up Appointments:    Home w/ resume [facility] [place] [facility]patient rehab       Start   Ordered   04/30/25 1527  Follow up for COPD/Pneumonia  (Post-Discharge Follow-Up Orders)  Once,   Status:  Canceled        Priority: Routine   Question Answer Comment   Patient to be seen for: COPD    Where to follow-up for COPD/Pneumonia? [facility]        04/30/25 1526         Follow-up Information    Follow up With Specialties Details Why Contact Info Additional Information   [facility] Multi-Specialty Schedule an appointment as soon as possible for a visit  [id number]  [facility] [address]  [phone] Your appointment is located at [address] in [place], [place].  Please park in the Yellow lot and go through the [place] [facility] entrance.  Then proceed to [address]. Masks are optional for all patients and visitors, unless otherwise indicated.   Heike Sorto MD Internal Medicine Schedule an appointment as soon as possible for a visit in 1 week(s)  [facility] [address]  [phone]        Future Appointments   Date Time Provider Department Center   5/8/2025  2:30 PM ELM CC LAB1 [facility] SW Inf [facility]   5/13/2025  4:00 PM EC CFTANYA RN RHEUMATOLOGY [facility]   5/15/2025  4:30 PM Heike Sorto MD [id number] [place] [address]   5/29/2025  1:45 PM Elena  Chapin THIBODEAUX MD [facility]ULM [facility]   6/5/2025  2:30 PM ELM CC LAB2 [facility] SW Inf [place]   6/6/2025 12:00 PM EC CF RN RHEUMATOLOGY [facility]   6/18/2025 10:30 AM Heike Sorto MD ECHNDIM [facility] [place]   7/3/2025  2:30 PM ELM CC LAB2 [facility] SW Inf [place]   7/7/2025  2:00 PM EC CF RN RHEUMATOLOGY [facility]   7/10/2025  1:45 PM Vic Thakkar APRN [facility]   8/6/2025  1:40 PM Ashia Goodrich MD [facility]   9/3/2025  1:00 PM Mikey Garcia MD ECCFHGASTRO [facility]   9/18/2025 12:15 PM Chapin Parker MD [facility]

## 2025-05-06 NOTE — TELEPHONE ENCOUNTER
The patient called to schedule a hospital follow up appointment, Please call the patient's daughter Brea.

## 2025-05-06 NOTE — TELEPHONE ENCOUNTER
Called patient daughter Brea answered phone verified she is on MANUEL and verified patient name and . Patient is sleeping so let daughter Brea know that Dr. Goodrich would like patient to hold the Cimzia injection for 1 week. Daughter verbalized understanding and patient rescheduled for 25 @ 4 pm.

## 2025-05-06 NOTE — PROGRESS NOTES
Provider Clarification     Additional clarification of the statu of pneumonia     Patient was treated for possible pneumonia     This note is part of the patient's medical record.

## 2025-05-07 ENCOUNTER — TELEPHONE (OUTPATIENT)
Dept: INTERNAL MEDICINE CLINIC | Facility: CLINIC | Age: 85
End: 2025-05-07

## 2025-05-07 NOTE — TELEPHONE ENCOUNTER
Refill request received for freestyle van CGM supplies via parachute from [facility].     Endocrine Refill protocol for CGM supplies     Protocol Criteria:  PASSED     If below requirement is met, send a 90-day supply with 1 refill per provider protocol.     Verify appointment with Endocrinology completed in the last 12 months or scheduled in the next 6 months     Last completed office visit:4/10/2025 Vic Thakkar APRN Visit date not found  Next scheduled Follow up:   Future Appointments   Date Time Provider Department Center   5/8/2025  2:30 PM ELM CC LAB2 EL[facility] SW Inf [place]   5/13/2025  4:00 PM EC CF RN RHEUMATOLOGY [facility]   5/15/2025  4:30 PM Heike Sorto MD [id number]   5/29/2025  1:45 PM Chapin Parker MD [facility]   6/5/2025  2:30 PM ELM CC LAB2 ELM SW Inf [place]   6/6/2025 12:00 PM EC [facility] RN RHEUMATOLOGY [facility]   6/18/2025 10:30 AM Heike Sorto MD ECHNDIM [facility] [place]   7/3/2025  2:30 PM ELM CC LAB2 ELM SW Inf [place]   7/7/2025  2:00 PM EC [facility] RN RHEUMATOLOGY [facility]   7/10/2025  1:45 PM Vic Thakkar APRN [facility]   8/6/2025  1:40 PM Ashia Goodrich MD [facility]   9/3/2025  1:00 PM Mikey Garcia MD ECCFHGASTRO EC CF   9/18/2025 12:15 PM Chapin Parker MD [facility]UL[facility]

## 2025-05-08 ENCOUNTER — NURSE ONLY (OUTPATIENT)
Age: 85
End: 2025-05-08
Attending: INTERNAL MEDICINE
Payer: MEDICARE

## 2025-05-08 DIAGNOSIS — D50.9 IRON DEFICIENCY ANEMIA, UNSPECIFIED IRON DEFICIENCY ANEMIA TYPE: Primary | ICD-10-CM

## 2025-05-08 DIAGNOSIS — Q27.30 AVM (ARTERIOVENOUS MALFORMATION) (HCC): ICD-10-CM

## 2025-05-08 RX ORDER — OCTREOTIDE ACETATE 20 MG
20 KIT INTRAMUSCULAR ONCE
Status: DISCONTINUED | OUTPATIENT
Start: 2025-05-08 | End: 2025-05-09

## 2025-05-08 RX ORDER — OCTREOTIDE ACETATE 20 MG
20 KIT INTRAMUSCULAR ONCE
Status: CANCELLED | OUTPATIENT
Start: 2025-06-05

## 2025-05-08 NOTE — TELEPHONE ENCOUNTER
Brea- daughter (on HIPAA) indicated that patient is doing a lot better. Discharged from the hospital on 5/5/2025. Gets tired if moves around too much. No wheezing. Cough is better. Using her nebulizer. Feels cold but denies any fevers. No other symptoms. Daughter scheduled a sooner appointment to follow up with Dr Sorto on 5/13/2025 at 1:30pm at the [place] office. Cancelled 5/15/2025 appointment.

## 2025-05-09 ENCOUNTER — TELEPHONE (OUTPATIENT)
Dept: NEUROLOGY | Facility: CLINIC | Age: 85
End: 2025-05-09

## 2025-05-09 ENCOUNTER — TELEPHONE (OUTPATIENT)
Dept: CASE MANAGEMENT | Age: 85
End: 2025-05-09

## 2025-05-09 DIAGNOSIS — I69.30 SEQUELA, POST-STROKE: Primary | ICD-10-CM

## 2025-05-09 NOTE — TELEPHONE ENCOUNTER
Dr. Sorto,     Patient needs referral to Dr. Dean.     Pended referral please review diagnosis and sign off if you agree.    Thank you.  Vandana Nicole  [facility]

## 2025-05-12 ENCOUNTER — TELEPHONE (OUTPATIENT)
Dept: INTERNAL MEDICINE CLINIC | Facility: CLINIC | Age: 85
End: 2025-05-12

## 2025-05-12 ENCOUNTER — NURSE ONLY (OUTPATIENT)
Age: 85
End: 2025-05-12
Attending: INTERNAL MEDICINE
Payer: MEDICARE

## 2025-05-12 DIAGNOSIS — D64.9 ANEMIA, UNSPECIFIED TYPE: ICD-10-CM

## 2025-05-12 DIAGNOSIS — N18.31 STAGE 3A CHRONIC KIDNEY DISEASE (HCC): ICD-10-CM

## 2025-05-12 DIAGNOSIS — E11.21 TYPE 2 DIABETES MELLITUS WITH NEPHROPATHY (HCC): ICD-10-CM

## 2025-05-12 DIAGNOSIS — Q27.30 AVM (ARTERIOVENOUS MALFORMATION) (HCC): ICD-10-CM

## 2025-05-12 DIAGNOSIS — D50.9 IRON DEFICIENCY ANEMIA, UNSPECIFIED IRON DEFICIENCY ANEMIA TYPE: Primary | ICD-10-CM

## 2025-05-12 DIAGNOSIS — E11.21 TYPE 2 DIABETES MELLITUS WITH NEPHROPATHY (HCC): Primary | ICD-10-CM

## 2025-05-12 PROBLEM — F40.240 CLAUSTROPHOBIA: Status: ACTIVE | Noted: 2025-02-19

## 2025-05-12 PROBLEM — R44.1 VISUAL HALLUCINATIONS: Status: RESOLVED | Noted: 2025-01-29 | Resolved: 2025-05-12

## 2025-05-12 LAB
ALBUMIN SERPL-MCNC: 3.4 G/DL (ref 3.2–4.8)
ALBUMIN/GLOB SERPL: 1.3 {RATIO} (ref 1–2)
ALP LIVER SERPL-CCNC: 73 U/L (ref 55–142)
ALT SERPL-CCNC: 58 U/L (ref 10–49)
ANION GAP SERPL CALC-SCNC: 7 MMOL/L (ref 0–18)
AST SERPL-CCNC: 58 U/L (ref ?–34)
BASOPHILS # BLD AUTO: 0.04 X10(3) UL (ref 0–0.2)
BASOPHILS NFR BLD AUTO: 0.3 %
BILIRUB SERPL-MCNC: 0.3 MG/DL (ref 0.2–1.1)
BUN BLD-MCNC: 21 MG/DL (ref 9–23)
BUN/CREAT SERPL: 22.6 (ref 10–20)
CALCIUM BLD-MCNC: 8.8 MG/DL (ref 8.7–10.4)
CHLORIDE SERPL-SCNC: 107 MMOL/L (ref 98–112)
CO2 SERPL-SCNC: 27 MMOL/L (ref 21–32)
CREAT BLD-MCNC: 0.93 MG/DL (ref 0.55–1.02)
DEPRECATED HBV CORE AB SER IA-ACNC: 79 NG/ML (ref 50–306)
DEPRECATED RDW RBC AUTO: 65.1 FL (ref 35.1–46.3)
EGFRCR SERPLBLD CKD-EPI 2021: 60 ML/MIN/1.73M2 (ref 60–?)
EOSINOPHIL # BLD AUTO: 0.14 X10(3) UL (ref 0–0.7)
EOSINOPHIL NFR BLD AUTO: 0.9 %
ERYTHROCYTE [DISTWIDTH] IN BLOOD BY AUTOMATED COUNT: 19.4 % (ref 11–15)
GLOBULIN PLAS-MCNC: 2.6 G/DL (ref 2–3.5)
GLUCOSE BLD-MCNC: 158 MG/DL (ref 70–99)
HCT VFR BLD AUTO: 25.8 % (ref 35–48)
HGB BLD-MCNC: 7.6 G/DL (ref 12–16)
IMM GRANULOCYTES # BLD AUTO: 0.1 X10(3) UL (ref 0–1)
IMM GRANULOCYTES NFR BLD: 0.7 %
IRON SATN MFR SERPL: 10 % (ref 15–50)
IRON SERPL-MCNC: 26 UG/DL (ref 50–170)
LYMPHOCYTES # BLD AUTO: 0.65 X10(3) UL (ref 1–4)
LYMPHOCYTES NFR BLD AUTO: 4.2 %
MCH RBC QN AUTO: 27 PG (ref 26–34)
MCHC RBC AUTO-ENTMCNC: 29.5 G/DL (ref 31–37)
MCV RBC AUTO: 91.8 FL (ref 80–100)
MONOCYTES # BLD AUTO: 0.54 X10(3) UL (ref 0.1–1)
MONOCYTES NFR BLD AUTO: 3.5 %
NEUTROPHILS # BLD AUTO: 13.87 X10 (3) UL (ref 1.5–7.7)
NEUTROPHILS # BLD AUTO: 13.87 X10(3) UL (ref 1.5–7.7)
NEUTROPHILS NFR BLD AUTO: 90.4 %
OSMOLALITY SERPL CALC.SUM OF ELEC: 298 MOSM/KG (ref 275–295)
PLATELET # BLD AUTO: 283 10(3)UL (ref 150–450)
PLATELET MORPHOLOGY: NORMAL
POTASSIUM SERPL-SCNC: 3.7 MMOL/L (ref 3.5–5.1)
PROT SERPL-MCNC: 6 G/DL (ref 5.7–8.2)
RBC # BLD AUTO: 2.81 X10(6)UL (ref 3.8–5.3)
SODIUM SERPL-SCNC: 141 MMOL/L (ref 136–145)
TOTAL IRON BINDING CAPACITY: 271 UG/DL (ref 250–425)
TRANSFERRIN SERPL-MCNC: 199 MG/DL (ref 250–380)
VIT D+METAB SERPL-MCNC: 22 NG/ML (ref 30–100)
WBC # BLD AUTO: 15.3 X10(3) UL (ref 4–11)

## 2025-05-12 RX ORDER — SODIUM CHLORIDE 9 MG/ML
10 INJECTION, SOLUTION INTRAMUSCULAR; INTRAVENOUS; SUBCUTANEOUS ONCE
OUTPATIENT
Start: 2025-05-12

## 2025-05-12 RX ORDER — OCTREOTIDE ACETATE 20 MG
20 KIT INTRAMUSCULAR ONCE
OUTPATIENT
Start: 2025-06-02

## 2025-05-12 RX ORDER — OCTREOTIDE ACETATE 20 MG
20 KIT INTRAMUSCULAR ONCE
Status: COMPLETED | OUTPATIENT
Start: 2025-05-12 | End: 2025-05-12

## 2025-05-12 RX ADMIN — OCTREOTIDE ACETATE 20 MG: 20 MG KIT INTRAMUSCULAR at 13:44:00

## 2025-05-13 ENCOUNTER — HOSPITAL ENCOUNTER (EMERGENCY)
Facility: HOSPITAL | Age: 85
Discharge: HOME | End: 2025-05-13
Attending: EMERGENCY MEDICINE
Payer: MEDICARE

## 2025-05-13 ENCOUNTER — TELEPHONE (OUTPATIENT)
Age: 85
End: 2025-05-13

## 2025-05-13 ENCOUNTER — OFFICE VISIT (OUTPATIENT)
Dept: INTERNAL MEDICINE CLINIC | Facility: CLINIC | Age: 85
End: 2025-05-13

## 2025-05-13 ENCOUNTER — TELEPHONE (OUTPATIENT)
Facility: CLINIC | Age: 85
End: 2025-05-13

## 2025-05-13 ENCOUNTER — PATIENT OUTREACH (OUTPATIENT)
Dept: CASE MANAGEMENT | Age: 85
End: 2025-05-13

## 2025-05-13 VITALS
SYSTOLIC BLOOD PRESSURE: 158 MMHG | OXYGEN SATURATION: 100 % | DIASTOLIC BLOOD PRESSURE: 70 MMHG | HEIGHT: 61 IN | BODY MASS INDEX: 27.19 KG/M2 | TEMPERATURE: 97 F | WEIGHT: 144 LBS | HEART RATE: 80 BPM

## 2025-05-13 VITALS
SYSTOLIC BLOOD PRESSURE: 159 MMHG | WEIGHT: 148 LBS | HEIGHT: 61 IN | HEART RATE: 80 BPM | OXYGEN SATURATION: 93 % | RESPIRATION RATE: 22 BRPM | TEMPERATURE: 99 F | DIASTOLIC BLOOD PRESSURE: 87 MMHG | BODY MASS INDEX: 27.94 KG/M2

## 2025-05-13 DIAGNOSIS — D62 ACUTE BLOOD LOSS ANEMIA: ICD-10-CM

## 2025-05-13 DIAGNOSIS — Z71.85 VACCINE COUNSELING: ICD-10-CM

## 2025-05-13 DIAGNOSIS — E11.9 TYPE 2 DIABETES MELLITUS WITHOUT RETINOPATHY (HCC): ICD-10-CM

## 2025-05-13 DIAGNOSIS — J43.8 OTHER EMPHYSEMA (HCC): ICD-10-CM

## 2025-05-13 DIAGNOSIS — J96.01 ACUTE HYPOXIC RESPIRATORY FAILURE (HCC): ICD-10-CM

## 2025-05-13 DIAGNOSIS — E55.9 VITAMIN D DEFICIENCY: ICD-10-CM

## 2025-05-13 DIAGNOSIS — D64.9 ACUTE ON CHRONIC ANEMIA: ICD-10-CM

## 2025-05-13 DIAGNOSIS — N30.00 ACUTE CYSTITIS WITHOUT HEMATURIA: Primary | ICD-10-CM

## 2025-05-13 DIAGNOSIS — E78.5 DYSLIPIDEMIA: ICD-10-CM

## 2025-05-13 DIAGNOSIS — D64.9 ANEMIA, UNSPECIFIED TYPE: Primary | ICD-10-CM

## 2025-05-13 LAB
ANION GAP SERPL CALC-SCNC: 6 MMOL/L (ref 0–18)
ANTIBODY SCREEN: NEGATIVE
BASOPHILS # BLD AUTO: 0.04 X10(3) UL (ref 0–0.2)
BASOPHILS NFR BLD AUTO: 0.3 %
BUN BLD-MCNC: 22 MG/DL (ref 9–23)
BUN/CREAT SERPL: 20.6 (ref 10–20)
CALCIUM BLD-MCNC: 8.6 MG/DL (ref 8.7–10.4)
CHLORIDE SERPL-SCNC: 106 MMOL/L (ref 98–112)
CO2 SERPL-SCNC: 27 MMOL/L (ref 21–32)
CREAT BLD-MCNC: 1.07 MG/DL (ref 0.55–1.02)
CREAT UR-SCNC: 74.5 MG/DL
DEPRECATED RDW RBC AUTO: 65 FL (ref 35.1–46.3)
EGFRCR SERPLBLD CKD-EPI 2021: 51 ML/MIN/1.73M2 (ref 60–?)
EOSINOPHIL # BLD AUTO: 0.08 X10(3) UL (ref 0–0.7)
EOSINOPHIL NFR BLD AUTO: 0.6 %
ERYTHROCYTE [DISTWIDTH] IN BLOOD BY AUTOMATED COUNT: 19.8 % (ref 11–15)
GLUCOSE BLD-MCNC: 240 MG/DL (ref 70–99)
HCT VFR BLD AUTO: 23.3 % (ref 35–48)
HGB BLD-MCNC: 7 G/DL (ref 12–16)
IMM GRANULOCYTES # BLD AUTO: 0.07 X10(3) UL (ref 0–1)
IMM GRANULOCYTES NFR BLD: 0.5 %
LYMPHOCYTES # BLD AUTO: 0.71 X10(3) UL (ref 1–4)
LYMPHOCYTES NFR BLD AUTO: 5.1 %
MCH RBC QN AUTO: 27.1 PG (ref 26–34)
MCHC RBC AUTO-ENTMCNC: 30 G/DL (ref 31–37)
MCV RBC AUTO: 90.3 FL (ref 80–100)
MICROALBUMIN UR-MCNC: 38.8 MG/DL
MICROALBUMIN/CREAT 24H UR-RTO: 520.8 UG/MG (ref ?–30)
MONOCYTES # BLD AUTO: 0.46 X10(3) UL (ref 0.1–1)
MONOCYTES NFR BLD AUTO: 3.3 %
NEUTROPHILS # BLD AUTO: 12.45 X10 (3) UL (ref 1.5–7.7)
NEUTROPHILS # BLD AUTO: 12.45 X10(3) UL (ref 1.5–7.7)
NEUTROPHILS NFR BLD AUTO: 90.2 %
OSMOLALITY SERPL CALC.SUM OF ELEC: 299 MOSM/KG (ref 275–295)
PLATELET # BLD AUTO: 248 10(3)UL (ref 150–450)
POTASSIUM SERPL-SCNC: 3.7 MMOL/L (ref 3.5–5.1)
RBC # BLD AUTO: 2.58 X10(6)UL (ref 3.8–5.3)
RH BLOOD TYPE: POSITIVE
SODIUM SERPL-SCNC: 139 MMOL/L (ref 136–145)
WBC # BLD AUTO: 13.8 X10(3) UL (ref 4–11)

## 2025-05-13 PROCEDURE — 3077F SYST BP >= 140 MM HG: CPT | Performed by: INTERNAL MEDICINE

## 2025-05-13 PROCEDURE — 36415 COLL VENOUS BLD VENIPUNCTURE: CPT

## 2025-05-13 PROCEDURE — 99285 EMERGENCY DEPT VISIT HI MDM: CPT

## 2025-05-13 PROCEDURE — 1159F MED LIST DOCD IN RCRD: CPT | Performed by: INTERNAL MEDICINE

## 2025-05-13 PROCEDURE — 85025 COMPLETE CBC W/AUTO DIFF WBC: CPT | Performed by: EMERGENCY MEDICINE

## 2025-05-13 PROCEDURE — 1160F RVW MEDS BY RX/DR IN RCRD: CPT | Performed by: INTERNAL MEDICINE

## 2025-05-13 PROCEDURE — 36430 TRANSFUSION BLD/BLD COMPNT: CPT

## 2025-05-13 PROCEDURE — 80048 BASIC METABOLIC PNL TOTAL CA: CPT | Performed by: EMERGENCY MEDICINE

## 2025-05-13 PROCEDURE — 1126F AMNT PAIN NOTED NONE PRSNT: CPT | Performed by: INTERNAL MEDICINE

## 2025-05-13 PROCEDURE — 3078F DIAST BP <80 MM HG: CPT | Performed by: INTERNAL MEDICINE

## 2025-05-13 PROCEDURE — 86920 COMPATIBILITY TEST SPIN: CPT

## 2025-05-13 PROCEDURE — 86850 RBC ANTIBODY SCREEN: CPT | Performed by: EMERGENCY MEDICINE

## 2025-05-13 PROCEDURE — 3008F BODY MASS INDEX DOCD: CPT | Performed by: INTERNAL MEDICINE

## 2025-05-13 PROCEDURE — 86901 BLOOD TYPING SEROLOGIC RH(D): CPT | Performed by: EMERGENCY MEDICINE

## 2025-05-13 PROCEDURE — 1111F DSCHRG MED/CURRENT MED MERGE: CPT | Performed by: INTERNAL MEDICINE

## 2025-05-13 PROCEDURE — 86900 BLOOD TYPING SEROLOGIC ABO: CPT | Performed by: EMERGENCY MEDICINE

## 2025-05-13 PROCEDURE — 99291 CRITICAL CARE FIRST HOUR: CPT

## 2025-05-13 PROCEDURE — 99495 TRANSJ CARE MGMT MOD F2F 14D: CPT | Performed by: INTERNAL MEDICINE

## 2025-05-13 RX ORDER — GLIPIZIDE 2.5 MG/1
2.5 TABLET ORAL EVERY 12 HOURS
Qty: 60 TABLET | Refills: 1 | Status: SHIPPED | OUTPATIENT
Start: 2025-05-13

## 2025-05-13 NOTE — TELEPHONE ENCOUNTER
Notified by ER, came for low hgb 7.6. to get transfused and then home.     GI RN pls set up follow-up appt with Dr Garcia in next 1-2 weeks

## 2025-05-13 NOTE — ED INITIAL ASSESSMENT (HPI)
Patient to ED from Dr. Sorto's office for low hemoglobin and need for blood transfusion. Patient reports weakness. Ambulatory with cane. Alert to self, situation and place.

## 2025-05-13 NOTE — PATIENT INSTRUCTIONS
ASSESSMENT/PLAN:     Encounter Diagnoses   Name Primary?     Yes    Acute blood loss anemia having black tarry stools.  History of AVMs.  Will send to the emergency room for blood transfusion try to keep hemoglobin above 8.  Spoke with oncology hematology.       Acute hypoxic respiratory failure (HCC) Improved. Check ambulatory pulse ox.        Acute on chronic anemia To ER due ot Sx, Needs blood transfusion.        Other emphysema (HCC) Improved. Decrease neb. 2 times a day for 1 week. Then as needed.  Follow-up with pulmonary.       Dyslipidemia Stable.        Type 2 diabetes mellitus without retinopathy (HCC) some lowers in the afternoon.  Decrease Lantus to 10 units.  Decrease glipizide to 2-1/2 mg prebreakfast and predinner try to check sugars prebed and keep above 130.  Take some extra protein if less than 130.  Call with sugars in 1 to 2 weeks. Careful with diet and excercise at least 30 minutes 3-4 times a week. Check sugars at different times on different dates. Careful with low sugars. Carry something with you and check sugar if can. Can carry hanh cracker, etc. Decrease carbohydrates. But also, careful with fruits and natural sugars.One serving a day and no more than 1 handful every day. Check feet  every AM and careful with sores and ulcers on feet bilaterally. Check eyes every year with dilated eye exam.  Check sugars.  2-hour postmeal should be less than 140s.  Pre-meal should be 's.  Both equally affected A1c.  Discussed importance of glycemic control to prevent complications of diabetes  -Discussed complications of diabetes include retinopathy, neuropathy, nephropathy and cardiovascular disease  -Discussed ABCs of DM  -Discussed importance of SBGM  -Discussed importance of low CHO diet, recommend 45gm per meal or 135gm per day  -UTD with optho, follow up 7-17-25.   Check urine.        Vaccine counseling Rec. Tdap. Check on insurance coverage. Recommend shingles vaccine.  There is 2 doses of  the vaccine  by 2 months 6 months apart.  Check on insurance coverage on shingles vaccine.  May be covered better at the pharmacy.  Separate shingles vaccine from all of the vaccines by at least 1 to 2 weeks.  Discussed about side effects of vaccine. Recommend RSV vaccine.  Would check on insurance coverage at local pharmacy.  RSV is a one-time vaccine as of now.  Potential side effects with RSV vaccine are low-grade fevers body aches etc.  Also would recommend flu shot.  Separate from RSV vaccine by 2 weeks.  Also would recommend new COVID-vaccine.  Separate from other 2 vaccines by 2 weeks.        Vitamin D deficiency Resume 62148 once a week. Check blood in 3 months.       HTN.  Just changed and added hydralazine.  Will add to blood pressures and call in 1 week.  Careful with diet and excercise at least 30 minutes 3-4 times a week. Check blood pressures at different times on different days. Can purchase own blood pressure monitor. If not, check at local pharmacy. Bake foods more and grill occasionally. Avoid fried foods. No salt. Use other seasonings.      Orders Placed This Encounter   Procedures    CBC With Differential With Platelet    Comp Metabolic Panel (14)    Ferritin    Iron And Tibc    Microalb/Creat Ratio, Random Urine    Vitamin D       Meds This Visit:  Requested Prescriptions     Signed Prescriptions Disp Refills    glipiZIDE 2.5 MG Oral Tab 60 tablet 1     Sig: Take 2.5 mg by mouth every 12 (twelve) hours.       Imaging & Referrals:  None        RTC for 3 months FU extended.     RTC after 12-12-25 for physical.

## 2025-05-13 NOTE — ED QUICK NOTES
Patient resting comfortably in bed w/ daughter at bedside.  Patient receiving blood transfusion at this time.  Patient on cardiac,bp,spo2 monitoring.  Will continue to monitor.    Bed is locked at lowest position, call light within reach.

## 2025-05-13 NOTE — TELEPHONE ENCOUNTER
Received call from patient daughter Brea verified patient name and . Daughter is on verbal release form. Patient is currently in the ED awaiting a blood transfusion and needs to cancel her 4pm Cimzia injection.

## 2025-05-13 NOTE — PROGRESS NOTES
HPI:    Patient ID: Chester Bautista is a 85 year old female.    Chief Complaint   Patient presents with    Hospital F/U     Patient states she is here for an hospital follow up         Patient here for follow up of Diabetes.  Has been taking medications regularly.    Checks sugars 1 times daily.  Fasting sugars average  130's. Last night 52.   Watches diabetic diet, low salt.  Diabetic Flow sheet      5/13/2025     9:57 PM 5/13/2025     8:40 PM 5/13/2025     8:30 PM 5/13/2025     7:15 PM   DIABETIC FLOWSHEET (EE)   Losartan Potassium 100 mg Daily  mg Daily OR  100 mg Daily OR  100 mg Daily OR    BP (enc vitals)  159/87 153/78 152/64      HISTORY OF DIABETES COMPLICATIONS: :  History of Retinopathy: N  History of Neuropathy: N  History of Nephropathy: Y     ASSOCIATED COMPLICATIONS:   HTN: Y  Hyperlipidemia: Y  Coronary Artery Disease:  CAD,  HF, PAD  Cerebrovascular Disease: N     MEALS:  2 -3  meals a day  Cooks at home    Hypertension  Patient is here for follow up of hypertension. BP at home:   Not check. Has been compliant with medications.  Exercise level: trying to do more and has been following low salt diet.  Weight has been stable. Daughter cooks.   Wt Readings from Last 3 Encounters:   05/13/25 148 lb (67.1 kg)   05/13/25 144 lb (65.3 kg)   04/29/25 139 lb 12.8 oz (63.4 kg)     BP Readings from Last 3 Encounters:   05/13/25 159/87   05/13/25 158/70   05/05/25 (!) 167/74     Labs:   Lab Results   Component Value Date/Time     (H) 05/13/2025 04:41 PM     05/13/2025 04:41 PM    K 3.7 05/13/2025 04:41 PM     05/13/2025 04:41 PM    CO2 27.0 05/13/2025 04:41 PM    CREATSERUM 1.07 (H) 05/13/2025 04:41 PM    CA 8.6 (L) 05/13/2025 04:41 PM    AST 58 (H) 05/12/2025 02:13 PM    ALT 58 (H) 05/12/2025 02:13 PM    TSH 0.418 (L) 01/17/2025 08:02 PM    T4F 1.2 01/17/2025 08:02 PM        Lab Results   Component Value Date/Time    CHOLEST 100 03/27/2025 02:57 PM    HDL 49 03/27/2025 02:57  PM    TRIG 101 03/27/2025 02:57 PM    LDL 32 03/27/2025 02:57 PM    NONHDLC 51 03/27/2025 02:57 PM            Wt Readings from Last 3 Encounters:   05/13/25 148 lb (67.1 kg)   05/13/25 144 lb (65.3 kg)   04/29/25 139 lb 12.8 oz (63.4 kg)     BP Readings from Last 3 Encounters:   05/13/25 159/87   05/13/25 158/70   05/05/25 (!) 167/74     Labs:   Lab Results   Component Value Date/Time     (H) 05/13/2025 04:41 PM     05/13/2025 04:41 PM    K 3.7 05/13/2025 04:41 PM     05/13/2025 04:41 PM    CO2 27.0 05/13/2025 04:41 PM    CREATSERUM 1.07 (H) 05/13/2025 04:41 PM    CA 8.6 (L) 05/13/2025 04:41 PM    AST 58 (H) 05/12/2025 02:13 PM    ALT 58 (H) 05/12/2025 02:13 PM    TSH 0.418 (L) 01/17/2025 08:02 PM    T4F 1.2 01/17/2025 08:02 PM        Lab Results   Component Value Date/Time    CHOLEST 100 03/27/2025 02:57 PM    HDL 49 03/27/2025 02:57 PM    TRIG 101 03/27/2025 02:57 PM    LDL 32 03/27/2025 02:57 PM    NONHDLC 51 03/27/2025 02:57 PM          Black tarry stools. Ocreotide infusion yesterday.   Sleep and hypoxia. Breathing abNL. Coughing (clear phlegm) X 1 week PTA. CAPUTO.    Date of Admission: 4/29/2025        Date of Discharge: 05/05/25       TCM Follow-Up Recommendation:  LACE > 58: High Risk of readmission after discharge from the hospital.     DISCHARGE DX: Principal Problem:    Acute hypoxic respiratory failure (HCC)  Active Problems:    COPD (chronic obstructive pulmonary disease) (HCC)    Community acquired pneumonia, unspecified laterality    Acute on chronic anemia        The patient was seen and examined on day of discharge and this discharge summary is in conjunction with any daily progress note from day of discharge.     HPI per admitting physician: \"The patient is an 85-year-old  female with known underlying chronic obstructive pulmonary disease, presented to the emergency department for evaluation of progressive shortness of breath and low oxygen saturation at home per  her family with increased cough. Pulse ox 84% on upon arrival. CBC showed hemoglobin of 8.2, slightly below her baseline. BUN and creatinine of 14 and 1.22. GFR is 43, which is at baseline. Magnesium 1.6. GeneXpert viral panel was negative. ProBNP 1200. Blood cultures were obtained. Lactic acid 0.9. Chest x-ray showed mixed patchy bilateral lung opacities, mildly increased since prior study which may reflect acute infection or edema. The patient was started on IV antibiotics, Solu-Medrol, nebulizer treatments, and she will be admitted to the hospital for further management. \"     Hospital Course:       Acute hypoxic respiratory failure (HCC)  -Patient presenting with increased shortness of breath with cough  -Noted to be hypoxic requiring supplemental O2, weaning as able   -Patient with history of COPD, ILD   -Chest x-ray reviewed.  Noted mixed patchy bilateral lung opacities.  - Concern for possible acute COPD exacerbation   -Unable to rule out community acquired pneumonia  - Completing tapering course of steroids,  - Continue scheduled inhalers.  - Completed course of empiric antibiotics  - Symptom relief as able  - Pulmonology on consult, f/u as outpt     Acute on chronic anemia  Possible GI bleed  - Patient noted to have persistent slow downtrend in hemoglobin  - Patient with history of AVMs  - Patient did note intermittent dark black stools.    - Hemoglobin decreased to 6.6 . status post 1 unit PRBC with good response.  -fecal occult stool heme positive   -No further signs of dark black stools.  - GI consulted, initially started patient on octreotide.  Not recommending further evaluation with procedures at this time.  F/u as outpt     HTN  - Elevations noted  - Continuing Coreg and increased dosing of losartan  -Added TID hydralazine  - PCP to monitor and adjust as needed     Acute Kidney Injury   -Improving   - Avoiding Nephrotoxic agents  - Cont to monitor     Type 2 DM   -With intermittent  hyperglycemia  -Likely worsened due to steroid use as above  -Had episode of hypoglycemia on 5/2.  - Continue Tresiba 16 units on dc.  - Monitoring Blood glucose with POC checks  - PCP to monitor and adjust agents as needed.      History of CVA  - Continue home regimen            Review of Systems   Constitutional:  Positive for fatigue. Negative for activity change, appetite change, chills, diaphoresis, fever and unexpected weight change.   HENT:  Negative for drooling, sore throat, trouble swallowing and voice change.    Respiratory:  Positive for shortness of breath. Negative for apnea, cough, choking, chest tightness, wheezing and stridor.    Cardiovascular:  Negative for chest pain, palpitations and leg swelling.   Gastrointestinal:  Negative for abdominal distention, abdominal pain, anal bleeding, blood in stool, constipation, diarrhea, nausea, rectal pain and vomiting.        Black tarry stools.   Endocrine: Negative for cold intolerance, heat intolerance, polydipsia, polyphagia and polyuria.   Genitourinary:  Negative for decreased urine volume, difficulty urinating, dysuria, flank pain, frequency, genital sores, hematuria, menstrual problem, pelvic pain and urgency.   Neurological:  Negative for dizziness, tremors, seizures, syncope, facial asymmetry, speech difficulty, weakness, light-headedness, numbness and headaches.   All other systems reviewed and are negative.        Current Outpatient Medications   Medication Sig Dispense Refill    glipiZIDE 2.5 MG Oral Tab Take 2.5 mg by mouth every 12 (twelve) hours. 60 tablet 1    aspirin (ASPIRIN LOW DOSE) 81 MG Oral Tab EC Take 1 tablet (81 mg total) by mouth in the morning. 90 tablet 3    pantoprazole 40 MG Oral Tab EC Take 1 tablet (40 mg total) by mouth in the morning and 1 tablet (40 mg total) in the evening. Take before meals. 60 tablet 0    carvedilol 25 MG Oral Tab Take 1 tablet (25 mg total) by mouth 2 (two) times daily with meals. 60 tablet 1     rOPINIRole 0.25 MG Oral Tab Take 1 tablet (0.25 mg total) by mouth every evening. 30 tablet 1    hydrALAZINE 25 MG Oral Tab Take 1 tablet (25 mg total) by mouth every 8 (eight) hours. 90 tablet 0    fluticasone-salmeterol 250-50 MCG/ACT Inhalation Aerosol Powder, Breath Activated Inhale 1 puff into the lungs 2 (two) times daily. 60 each 0    LANTUS SOLOSTAR 100 UNIT/ML Subcutaneous Solution Pen-injector Inject 10 Units into the skin every morning.      losartan 100 MG Oral Tab Take 1 tablet (100 mg total) by mouth daily. 30 tablet 0    benzonatate 200 MG Oral Cap Take 1 capsule (200 mg total) by mouth 3 (three) times daily as needed for cough. 15 capsule 0    gabapentin 100 MG Oral Cap Take 1 capsule (100 mg total) by mouth nightly.      insulin lispro 100 UNIT/ML Injection Solution Inject 3 Units into the skin in the morning and 3 Units at noon and 3 Units in the evening. Inject before meals. Sliding scale depending on blood sugar.      ipratropium 0.02 % Inhalation Solution Take 2.5 mL (500 mcg total) by nebulization 2 (two) times daily. 187.5 mL 2    lidocaine-menthol 4-1 % External Patch Place 1 patch onto the skin in the morning.      acetaminophen 325 MG Oral Tab Take 2 tablets (650 mg total) by mouth every 6 (six) hours as needed.      rosuvastatin 20 MG Oral Tab Take 1 tablet (20 mg total) by mouth nightly. 90 tablet 3    magnesium oxide 400 MG Oral Tab Take 1 tablet (400 mg total) by mouth 2 (two) times daily. Per Dr. JENSEN Mata      predniSONE 5 MG Oral Tab Take 1 tablet (5 mg total) by mouth daily. 90 tablet 1    albuterol 108 (90 Base) MCG/ACT Inhalation Aero Soln TAKE 2 PUFFS BY MOUTH EVERY 4 TO 6 HOURS AS NEEDED 6.7 each 5    BD PEN NEEDLE KADEN 2ND GEN 32G X 4 MM Does not apply Misc 1 EACH DAILY. USE DAILY WITH INSULIN 100 each 1    montelukast 10 MG Oral Tab Take 1 tablet (10 mg total) by mouth nightly. 90 tablet 3    Blood Glucose Monitoring Suppl (BLOOD GLUCOSE SYSTEM GILLES) Does not apply Kit Dx. E11.9.  Checks qam. 1 kit 0    Blood Glucose Monitoring Suppl w/Device Does not apply Kit Dx. E11.9. Checks qam. 1 kit 0    Blood Gluc Meter Disp-Strips Does not apply Device Dx. E11.9. Checks qam. 100 each 0    Accu-Chek FastClix Lancets Does not apply Misc Check sugar once daily as directed (E11.42) 100 each 2    Blood Glucose Monitoring Suppl (ONETOUCH VERIO) w/Device Does not apply Kit 1 each daily. Test 1x daily 1 kit 0    OneTouch Delica Lancets 33G Does not apply Misc 4 x daily 100 each 1     Allergies:  Allergies   Allergen Reactions    Celebrex [Celecoxib] PALPITATIONS    Penicillins ITCHING and SWELLING    Amlodipine OTHER (SEE COMMENTS)     Calves Swelling     Metformin And Related UNKNOWN    Olmesartan UNKNOWN       HISTORY:  Past Medical History:    Anxiety state    Cancer (HCC)    breast cancer 2018    Cerebellar stroke (HCC)    Chronic obstructive pulmonary disease, unspecified (HCC)    Chronic obstructive pulmonary disease, unspecified (HCC)    COPD (chronic obstructive pulmonary disease) (HCC)    Cystic thyroid nodule    Diabetes (HCC)    Diabetes mellitus (HCC)    Essential hypertension    Exposure to medical diagnostic radiation    High blood pressure    High cholesterol    History of blood transfusion    low hemoglobin    Osteoarthritis    PONV (postoperative nausea and vomiting)    Pulmonary embolism (HCC)    Pulmonary emphysema (HCC)    Rheumatoid arthritis (HCC)    Stroke (HCC)      Past Surgical History:   Procedure Laterality Date    Cataract extraction w/  intraocular lens implant Bilateral 2017    Dr in [facility]     Colonoscopy      Colonoscopy N/A 07/21/2022    Procedure: COLONOSCOPY;  Surgeon: Mikey Garcia MD;  Location: [facility] ENDOSCOPY    Colonoscopy N/A 06/15/2023    Procedure: COLONOSCOPY;  Surgeon: Mikey Garcia MD;  Location: [facility] ENDOSCOPY    Correct bunion,simple      right foot  1993    Egd  12/21/2023    Dr. Garcia; Duodenal AVM's x4 cauterized    Hysterectomy      1972, no  abnormal Paps, due to heavy bleeding with fibroids.  Not sure if it had bilateral salpingo-oophorectomy.    Ir aneurysm repairm      Computer assisted volumetric craniofomy with clipping of anterior cerebral artery.    Lumpectomy right      Radiation right      Rotator cuff repair      1993    Total knee replacement Right 2010    Total knee replacement Left 2015    Transoral incisionless fundoplication - internal  2012 Fredy fundoplication at [facility] Dr. Fournier.    Fredy fundoplication at [facility] Dr. Fournier.    Trigger finger release      left hand      Yag capsulotomy - ou - both eyes Bilateral 2021    done in [place]      Family History   Problem Relation Age of Onset    Heart Surgery Mother         Leaky valve at 41 y/o.     Prostate Cancer Brother     Cancer Brother     Diabetes Maternal Grandmother     Diabetes Paternal Aunt     Breast Cancer Self 78    Breast Cancer Niece         before 50    Macular degeneration Neg     Glaucoma Neg       Social History:   Social History     Socioeconomic History    Marital status:    Tobacco Use    Smoking status: Former     Current packs/day: 0.00     Types: Cigarettes     Quit date:      Years since quittin.3     Passive exposure: Past    Smokeless tobacco: Never    Tobacco comments:     [id number]   Vaping Use    Vaping status: Never Used   Substance and Sexual Activity    Alcohol use: Never    Drug use: Never   Social History Narrative    Just moved in from [place].   passed away and that is why moved from [place] to be with daughter who lives in [place].  Currently lives with daughter.     Social Drivers of Health     Food Insecurity: No Food Insecurity (2025)    NCSS - Food Insecurity     Worried About Running Out of Food in the Last Year: No     Ran Out of Food in the Last Year: No   Transportation Needs: No Transportation Needs (2025)    NCSS - Transportation     Lack  of Transportation: No   Housing Stability: Not At Risk (4/29/2025)    NCSS - Housing/Utilities     Has Housing: Yes     Worried About Losing Housing: No     Unable to Get Utilities: No        PHYSICAL EXAM:   /70 (BP Location: Left arm, Patient Position: Sitting, Cuff Size: adult)   Pulse 80   Temp 97.3 °F (36.3 °C) (Temporal)   Ht 5' 1\" (1.549 m)   Wt 144 lb (65.3 kg)   SpO2 100%   BMI 27.21 kg/m²   BP Readings from Last 3 Encounters:   05/13/25 159/87   05/13/25 158/70   05/05/25 (!) 167/74     Wt Readings from Last 3 Encounters:   05/13/25 148 lb (67.1 kg)   05/13/25 144 lb (65.3 kg)   04/29/25 139 lb 12.8 oz (63.4 kg)       Physical Exam  Vitals and nursing note reviewed.   Constitutional:       General: She is not in acute distress.     Appearance: Normal appearance. She is well-developed and well-groomed. She is not ill-appearing, toxic-appearing or diaphoretic.      Interventions: She is not intubated.  Eyes:      General: No scleral icterus.     Conjunctiva/sclera: Conjunctivae normal.   Neck:      Thyroid: No thyroid mass or thyromegaly.      Trachea: Trachea and phonation normal.   Cardiovascular:      Rate and Rhythm: Normal rate and regular rhythm.      Pulses: Normal pulses. No decreased pulses.           Carotid pulses are 2+ on the right side and 2+ on the left side.       Radial pulses are 2+ on the right side and 2+ on the left side.        Dorsalis pedis pulses are 2+ on the right side and 2+ on the left side.        Posterior tibial pulses are 2+ on the right side and 2+ on the left side.      Heart sounds: Normal heart sounds, S1 normal and S2 normal.   Pulmonary:      Effort: Pulmonary effort is normal. No tachypnea, bradypnea, accessory muscle usage, prolonged expiration, respiratory distress or retractions. She is not intubated.      Breath sounds: Normal breath sounds and air entry. No stridor, decreased air movement or transmitted upper airway sounds. No decreased breath sounds,  wheezing, rhonchi or rales.   Chest:      Chest wall: No tenderness.   Abdominal:      General: Bowel sounds are normal. There is no distension.      Palpations: Abdomen is soft. Abdomen is not rigid. There is no mass.      Tenderness: There is no abdominal tenderness. There is no right CVA tenderness, left CVA tenderness, guarding or rebound.   Musculoskeletal:      Right lower leg: No edema.      Left lower leg: No edema.   Lymphadenopathy:      Cervical: No cervical adenopathy.   Skin:     General: Skin is warm and dry.   Neurological:      Mental Status: She is alert and oriented to person, place, and time.   Psychiatric:         Mood and Affect: Mood normal.         Behavior: Behavior normal. Behavior is cooperative.         Thought Content: Thought content normal.              ASSESSMENT/PLAN:     Encounter Diagnoses   Name Primary?     Yes    Acute blood loss anemia having black tarry stools.  History of AVMs.  Will send to the emergency room for blood transfusion try to keep hemoglobin above 8.  Spoke with oncology hematology.       Acute hypoxic respiratory failure (HCC) Improved. Check ambulatory pulse ox.        Acute on chronic anemia To ER due ot Sx, Needs blood transfusion.        Other emphysema (HCC) Improved. Decrease neb. 2 times a day for 1 week. Then as needed.  Follow-up with pulmonary.       Dyslipidemia Stable.        Type 2 diabetes mellitus without retinopathy (HCC) some lowers in the afternoon.  Decrease Lantus to 10 units.  Decrease glipizide to 2-1/2 mg prebreakfast and predinner try to check sugars prebed and keep above 130.  Take some extra protein if less than 130.  Call with sugars in 1 to 2 weeks. Careful with diet and excercise at least 30 minutes 3-4 times a week. Check sugars at different times on different dates. Careful with low sugars. Carry something with you and check sugar if can. Can carry hanh cracker, etc. Decrease carbohydrates. But also, careful with fruits and  natural sugars.One serving a day and no more than 1 handful every day. Check feet  every AM and careful with sores and ulcers on feet bilaterally. Check eyes every year with dilated eye exam.  Check sugars.  2-hour postmeal should be less than 140s.  Pre-meal should be 's.  Both equally affected A1c.  Discussed importance of glycemic control to prevent complications of diabetes  -Discussed complications of diabetes include retinopathy, neuropathy, nephropathy and cardiovascular disease  -Discussed ABCs of DM  -Discussed importance of SBGM  -Discussed importance of low CHO diet, recommend 45gm per meal or 135gm per day  -UTD with optho, follow up 7-17-25.   Check urine.        Vaccine counseling Rec. Tdap. Check on insurance coverage. Recommend shingles vaccine.  There is 2 doses of the vaccine  by 2 months 6 months apart.  Check on insurance coverage on shingles vaccine.  May be covered better at the pharmacy.  Separate shingles vaccine from all of the vaccines by at least 1 to 2 weeks.  Discussed about side effects of vaccine. Recommend RSV vaccine.  Would check on insurance coverage at local pharmacy.  RSV is a one-time vaccine as of now.  Potential side effects with RSV vaccine are low-grade fevers body aches etc.  Also would recommend flu shot.  Separate from RSV vaccine by 2 weeks.  Also would recommend new COVID-vaccine.  Separate from other 2 vaccines by 2 weeks.        Vitamin D deficiency Resume 12377 once a week. Check blood in 3 months.       HTN.  Just changed and added hydralazine.  Will add to blood pressures and call in 1 week.  Careful with diet and excercise at least 30 minutes 3-4 times a week. Check blood pressures at different times on different days. Can purchase own blood pressure monitor. If not, check at local pharmacy. Bake foods more and grill occasionally. Avoid fried foods. No salt. Use other seasonings.      Orders Placed This Encounter   Procedures    CBC With  Differential With Platelet    Comp Metabolic Panel (14)    Ferritin    Iron And Tibc    Microalb/Creat Ratio, Random Urine    Vitamin D       Meds This Visit:  Requested Prescriptions     Signed Prescriptions Disp Refills    glipiZIDE 2.5 MG Oral Tab 60 tablet 1     Sig: Take 2.5 mg by mouth every 12 (twelve) hours.       Imaging & Referrals:  None        RTC for 3 months FU extended.   RTC after 12-12-25 for physical.     Spoke to Yg at [facility] emergency room triage nurse and aware that they can be sending over for blood transfusion.    Full H&P done with daughter Brea Leyva present with patient permission.

## 2025-05-13 NOTE — TELEPHONE ENCOUNTER
Dr. Garcia,   Please see below message from Dr. Mcqueen. When do you want to see pt in clinic as you have no openings?  Thanks,  Kami

## 2025-05-13 NOTE — ED PROVIDER NOTES
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  She was hospitalized earlier this month and was following up with her PCP.  Routine labs were drawn and she was found to be anemic and was sent to the ED.  She does admit to ongoing dark tarry stools, and fatigue.        Objective:     Past Medical History:    Anxiety state    Cancer (HCC)    breast cancer 2018    Cerebellar stroke (HCC)    Chronic obstructive pulmonary disease, unspecified (HCC)    Chronic obstructive pulmonary disease, unspecified (HCC)    COPD (chronic obstructive pulmonary disease) (HCC)    Cystic thyroid nodule    Diabetes (HCC)    Diabetes mellitus (HCC)    Essential hypertension    Exposure to medical diagnostic radiation    High blood pressure    High cholesterol    History of blood transfusion    low hemoglobin    Osteoarthritis    PONV (postoperative nausea and vomiting)    Pulmonary embolism (HCC)    Pulmonary emphysema (HCC)    Rheumatoid arthritis (HCC)    Stroke (HCC)              Past Surgical History:   Procedure Laterality Date    Cataract extraction w/  intraocular lens implant Bilateral 2017    Dr in [facility]     Colonoscopy      Colonoscopy N/A 07/21/2022    Procedure: COLONOSCOPY;  Surgeon: Mikey Garcia MD;  Location: [facility] ENDOSCOPY    Colonoscopy N/A 06/15/2023    Procedure: COLONOSCOPY;  Surgeon: Mikey Garcia MD;  Location: [facility] ENDOSCOPY    Correct bunion,simple      right foot  1993    Egd  12/21/2023    Dr. Garcia; Duodenal AVM's x4 cauterized    Hysterectomy      1972, no abnormal Paps, due to heavy bleeding with fibroids.  Not  sure if it had bilateral salpingo-oophorectomy.    Ir aneurysm repairm      Computer assisted volumetric craniofomy with clipping of anterior cerebral artery.    Lumpectomy right      Radiation right      Rotator cuff repair          Total knee replacement Right 2010    Total knee replacement Left 2015    Transoral incisionless fundoplication - internal  2012 Fredy fundoplication at [facility] Dr. Fournier.    Fredy fundoplication at [facility] Dr. Fournier.    Trigger finger release      left hand      Yag capsulotomy - ou - both eyes Bilateral 2021    done in [place]                Social History     Socioeconomic History    Marital status:    Tobacco Use    Smoking status: Former     Current packs/day: 0.00     Types: Cigarettes     Quit date:      Years since quittin.3     Passive exposure: Past    Smokeless tobacco: Never    Tobacco comments:     [id number]   Vaping Use    Vaping status: Never Used   Substance and Sexual Activity    Alcohol use: Never    Drug use: Never   Social History Narrative    Just moved in from [place].   passed away and that is why moved from [place] to be with daughter who lives in [place].  Currently lives with daughter.     Social Drivers of Health     Food Insecurity: No Food Insecurity (2025)    NCSS - Food Insecurity     Worried About Running Out of Food in the Last Year: No     Ran Out of Food in the Last Year: No   Transportation Needs: No Transportation Needs (2025)    NCSS - Transportation     Lack of Transportation: No   Housing Stability: Not At Risk (2025)    NCSS - Housing/Utilities     Has Housing: Yes     Worried About Losing Housing: No     Unable to Get Utilities: No                                Physical Exam     ED Triage Vitals [25 1526]   /72   Pulse 86   Resp 16   Temp 98.2 °F (36.8 °C)   Temp src Oral   SpO2 92 %   O2 Device None (Room air)        Current Vitals:   Vital Signs  BP: 159/87  Pulse: 80  Resp: 22  Temp: 98.7 °F (37.1 °C)  Temp src: Oral  MAP (mmHg): 99    Oxygen Therapy  SpO2: 93 %  O2 Device: None (Room air)          Physical Exam  Vitals and nursing note reviewed.   Constitutional:       Appearance: She is well-developed.   HENT:      Head: Normocephalic and atraumatic.   Eyes:      Extraocular Movements: Extraocular movements intact.   Cardiovascular:      Rate and Rhythm: Normal rate and regular rhythm.      Heart sounds: Normal heart sounds.   Pulmonary:      Effort: Pulmonary effort is normal.      Breath sounds: Normal breath sounds.   Abdominal:      General: There is no distension.      Palpations: Abdomen is soft.      Tenderness: There is no abdominal tenderness.   Musculoskeletal:         General: Normal range of motion.      Cervical back: Normal range of motion.   Skin:     General: Skin is warm.      Capillary Refill: Capillary refill takes less than 2 seconds.   Neurological:      General: No focal deficit present.      Mental Status: She is alert.      Cranial Nerves: No cranial nerve deficit.      Sensory: No sensory deficit.      Motor: No weakness.      Coordination: Coordination normal.      Comments: No focal deficits     Differential diagnosis includes but is not limited to anemia, AVM, diverticular bleed, renal failure            ED Course     Labs Reviewed   BASIC METABOLIC PANEL (8) - Abnormal; Notable for the following components:       Result Value    Glucose 240 (*)     Creatinine 1.07 (*)     BUN/CREA Ratio 20.6 (*)     Calcium, Total 8.6 (*)     Calculated Osmolality 299 (*)     eGFR-Cr 51 (*)     All other components within normal limits   CBC WITH DIFFERENTIAL WITH PLATELET - Abnormal; Notable for the following components:    WBC 13.8 (*)     RBC 2.58 (*)     HGB 7.0 (*)     HCT 23.3 (*)     MCHC 30.0 (*)     RDW-SD 65.0 (*)     RDW 19.8 (*)     Neutrophil Absolute Prelim 12.45 (*)     Neutrophil Absolute  12.45 (*)     Lymphocyte Absolute 0.71 (*)     All other components within normal limits   TYPE AND SCREEN    Narrative:     The following orders were created for panel order Type and screen.  Procedure                               Abnormality         Status                     ---------                               -----------         ------                     ABORH (Blood Type)[[id number]]                               Final result               Antibody Screen[[id number]]                                  Final result                 Please view results for these tests on the individual orders.   ABORH (BLOOD TYPE)   ANTIBODY SCREEN   PREPARE RBC       ED Course as of 05/13/25 2111  ------------------------------------------------------------  Time: 05/13 1608  Comment: Discussed with Dr. Mcqueen with GI: As patient is stable without hemodynamically significant bleeding and complex history of AVMs without significant improvement after endoscopy, she did not advise admission and repeat endoscopy here today and will have Dr. Garcia clinic follow-up with patient for further management or consideration of further treatment [facility]        Medical Decision Making  The patient is very well-appearing, hemodynamically stable and nontoxic.  Hemoglobin today is 7.0.  1 unit PRBC transfusion ordered.  I spoke with Dr. Mcqueen with GI as above.  Patient is comfortable with this plan for transfusion and outpatient follow-up with GI and will also consider iron infusion through her hematologist.    Amount and/or Complexity of Data Reviewed  External Data Reviewed: labs and notes.     Details: CBC from 5/12/2025 shows hemoglobin 7.6.  CBC from 5/5/2025 shows hemoglobin 8.9.      Reviewed GI consult note by Dr. Charles on 5/3/2025: Reportedly there were many discussions regarding challenges that it posed by her other comorbidities and endoscopic versus medical management was discussed at that time and ultimately  endoscopic treatment was felt to be overall low yield and medical management was pursued  Labs: ordered. Decision-making details documented in ED Course.  Discussion of management or test interpretation with external provider(s): As above    Risk  Drug therapy requiring intensive monitoring for toxicity.  Decision regarding hospitalization.      I spent a total of 40 minutes of critical care time in obtaining history, performing a physical exam, bedside monitoring of interventions, collecting and interpreting tests and discussion with consultants but not including time spent performing procedures.    Disposition and Plan     Clinical Impression:  1. Anemia, unspecified type         Disposition:  Discharge  5/13/2025  9:11 pm    Follow-up:  Mikey Garcia MD  [address]  [facility] [id number] [phone]    Follow up in 1 week(s)      Eric Mohamud MD  [address]  [facility] [address]  [phone]    Follow up      We recommend that you schedule follow up care with a primary care provider within the next three months to obtain basic health screening including reassessment of your blood pressure.      Medications Prescribed:  Current Discharge Medication List                Supplementary Documentation:

## 2025-05-13 NOTE — PROGRESS NOTES
RODRIGO was going to call dtcleo Guidry for DESI 7 d f/u, but pt currently in ER. NCM to f/u, tomorrow.      ED  Current  5/13/2025 - present  [facility] Emergency Department     Annelise Muniz MD  Last attending  Treatment team Abnormal Labs   Weakness  Chief complaint

## 2025-05-14 NOTE — PROGRESS NOTES
DESI Follow-up Assessment    General:  Assessment completed with: Children (Brianne)  Chief Complaint: Chief Complaint: DISCHARGE DX: Principal Problem:    Acute hypoxic respiratory failure (HCC)  Active Problems:    COPD (chronic obstructive pulmonary disease) (HCC)    Community acquired pneumonia, unspecified laterality    Acute on chronic anemia    Progress/Care Plan:  Is the patient progressing as planned?: Yes  Care Plan Update: Pt saw PCP yesterday, for TCM and was sent and seen in ER for anemia (hgb 7.0) and discharged, after receiving 1 U PRBCs. Dtr Brea reports pt feeling better, since yesterday's ER visit--still some fatigue, but improving after blood transfusion, sleeping well, appetite good, tolerating medications, as prescribed.  New Care Plan: Dtr will f/u with Dr. Mohamud's office, as directed, confirms specialist appts, as scheduled, outpt rehab currently on hold.  Frequency/Follow Up Plan: 14 day follow up, next week     Notes:  Navigator Notes: Dtr denies pt with any fever, chills, headache, vision changes, dizziness, nausea, vomiting, diarrhea, bleeding, irregular heartbeat or fast pulse, loss of vision, speech or strength or coordination in any body part, chest pain or shortness of breath at this time. Dtr aware when to contact PCP/specialists and when to seek emergency care. No further questions/concerns at this time.         Future Appointments   Date Time Provider Department Center   5/21/2025 11:00 AM Mikey Garcia MD ECCFHGASTRO [facility]   5/29/2025  1:45 PM Chapin Parker MD ECWMOPULM [facility]   6/11/2025  2:00 PM [facility] CC LAB2 [facility] SW [facility] [place] [facility]   6/18/2025 10:30 AM Heike Sorto MD ECHNDIM [facility] [place]   7/7/2025  2:00 PM [facility] RN RHEUMATOLOGY DEYANIRAWMORHDEEPAKM [facility]   7/9/2025  2:00 PM EL[facility] CC LAB2 [facility] SW [facility] [place]   7/10/2025  1:45 PM Vic Thakkar APRN ECWMOHENRY [facility]   8/6/2025  1:40 PM Ashia Goodrich MD ECWMORHEUM [facility]   8/15/2025  1:30 PM  Heike Sorto MD ECHNDIM EC [place]   9/3/2025  1:00 PM Mikey Garcia MD ECCFHGASTRO [facility]   9/18/2025 12:15 PM [facility]   12/19/2025  1:30 PM Heike Sorto MD ECHNDIM  [place]

## 2025-05-14 NOTE — TELEPHONE ENCOUNTER
RN called and spoke to pt's daughter Brea. Pt scheduled for clinic appt on 5/21 per Dr. Garcia. Date, time, and location verified with daughter. She verbalized understanding.    Your Appointments      Wednesday May 21, 2025 11:00 AM  Follow Up Visit with Mikey Garcia MD  [facility] [address] [address]  [phone]

## 2025-05-14 NOTE — ED QUICK NOTES
Patient resting in bed.  Blood continues to transfuse.  Patient on cardiac,bp,spo2 monitoring  No additional needs at this time    Will continue to monitor

## 2025-05-15 ENCOUNTER — TELEPHONE (OUTPATIENT)
Age: 85
End: 2025-05-15

## 2025-05-15 LAB
BLOOD TYPE BARCODE: 7300
UNIT VOLUME: 350 ML

## 2025-05-15 NOTE — TELEPHONE ENCOUNTER
Called and spoke with the patient's daughter, Brea. Told her Dr. Mohamud already placed orders for more IV iron and we have sent a message to our infusion schedulers to schedule her. Also, told her we will schedule a follow up appointment with BILL Garces on the same day as her iron infusion, since she's due for a follow up. She verbalized understanding and thanked me for the call.

## 2025-05-21 ENCOUNTER — PATIENT MESSAGE (OUTPATIENT)
Facility: CLINIC | Age: 85
End: 2025-05-21

## 2025-05-21 ENCOUNTER — PATIENT OUTREACH (OUTPATIENT)
Dept: CASE MANAGEMENT | Age: 85
End: 2025-05-21

## 2025-05-21 ENCOUNTER — TELEPHONE (OUTPATIENT)
Age: 85
End: 2025-05-21

## 2025-05-21 ENCOUNTER — OFFICE VISIT (OUTPATIENT)
Facility: CLINIC | Age: 85
End: 2025-05-21

## 2025-05-21 VITALS
DIASTOLIC BLOOD PRESSURE: 65 MMHG | SYSTOLIC BLOOD PRESSURE: 132 MMHG | BODY MASS INDEX: 26.62 KG/M2 | HEART RATE: 91 BPM | HEIGHT: 61 IN | WEIGHT: 141 LBS

## 2025-05-21 DIAGNOSIS — K62.5 RECTAL BLEEDING: ICD-10-CM

## 2025-05-21 DIAGNOSIS — Q27.30 AVM (ARTERIOVENOUS MALFORMATION) (HCC): Primary | ICD-10-CM

## 2025-05-21 DIAGNOSIS — D50.9 IRON DEFICIENCY ANEMIA, UNSPECIFIED IRON DEFICIENCY ANEMIA TYPE: ICD-10-CM

## 2025-05-21 PROCEDURE — 1159F MED LIST DOCD IN RCRD: CPT | Performed by: INTERNAL MEDICINE

## 2025-05-21 PROCEDURE — 1160F RVW MEDS BY RX/DR IN RCRD: CPT | Performed by: INTERNAL MEDICINE

## 2025-05-21 PROCEDURE — 1111F DSCHRG MED/CURRENT MED MERGE: CPT | Performed by: INTERNAL MEDICINE

## 2025-05-21 PROCEDURE — 1126F AMNT PAIN NOTED NONE PRSNT: CPT | Performed by: INTERNAL MEDICINE

## 2025-05-21 PROCEDURE — 99214 OFFICE O/P EST MOD 30 MIN: CPT | Performed by: INTERNAL MEDICINE

## 2025-05-21 PROCEDURE — 3008F BODY MASS INDEX DOCD: CPT | Performed by: INTERNAL MEDICINE

## 2025-05-21 PROCEDURE — 3075F SYST BP GE 130 - 139MM HG: CPT | Performed by: INTERNAL MEDICINE

## 2025-05-21 PROCEDURE — 3078F DIAST BP <80 MM HG: CPT | Performed by: INTERNAL MEDICINE

## 2025-05-21 NOTE — PROGRESS NOTES
DESI Follow-up Assessment    General:  Assessment completed with: Children (dtr Brea)  Chief Complaint: DISCHARGE DX: Principal Problem:    Acute hypoxic respiratory failure (HCC)  Active Problems:    COPD (chronic obstructive pulmonary disease) (HCC)    Community acquired pneumonia, unspecified laterality    Acute on chronic anemia    Progress/Care Plan:  Is the patient progressing as planned?: Yes  Care Plan Update: Pt did see Dr. Garcia today, as scheduled--dtr waiting for provider response RE: baby aspirin. Dtr Brea reports pt still fatigued, sleeping well, appetite good, tolerating medications, as prescribed.  New Care Plan: Dtr waiting for call back from Dr. Mohamud's office RE: IV iron infusions and call back from Dr. Goodrich's office, RE: Cimza injections, confirms specialist appts, as scheduled, outpt rehab on hold. Dr. Garcia's office is assisting dtr with getting pt an appt with [facility] for endoscopy. Dtr also relayed that Dr. Garcia would be messaging Dr. Mohamud directly to facilitate appts/infusions.  Frequency/Follow Up Plan: 21 day follow up, next week     Notes:  Navigator Notes: Dtr denies pt with any fever, chills, headache, vision changes, dizziness, nausea, vomiting, diarrhea, intractable, wild bleeding, irregular heartbeat or fast pulse, loss of vision, speech or strength or coordination in any body part, chest pain or shortness of breath at this time. Dtr aware when to contact PCP/specialists and when to seek emergency care. No further questions/concerns at this time.       Future Appointments   Date Time Provider Department Center   5/29/2025  1:45 PM Chapin Parker MD [facility]ULM [facility]   6/11/2025  2:00 PM [facility] CC LAB2 [facility] SW [facility] [place] [facility]   6/18/2025 10:30 AM Heike Sorto MD [facility]CHNDIM [place]   7/7/2025  2:00 PM [facility] RN RHEUMATOLOGY ECWMORHHARSHA [facility]   7/9/2025  2:00 PM EL[facility] CC LAB2 EL[facility] SW [facility] [place]   7/10/2025  1:45 PM Vic Thakkar APRN ECWMOENDO [facility]   8/6/2025  1:40 PM Ashia Goodrich MD ECWMORHEUSHIV [facility]   8/15/2025  1:30 PM Heike Sorto MD ECHNDIM EC [place]   9/3/2025  1:00 PM Mkiey Garcia MD ECCFHGASTNICHOL [facility]   9/18/2025 12:15 PM Chapin Parker MD ECWMOPULSHIV [facility]   12/19/2025  1:30 PM Heike Sorto MD ECHNDIM  [place]

## 2025-05-21 NOTE — TELEPHONE ENCOUNTER
Patients daughter called on her behalf asking to schedule a nurse visit for her Cimzia injection.

## 2025-05-21 NOTE — PROGRESS NOTES
Chester Bautista is a 85 year old female.    HPI:     Chief Complaint   Patient presents with    Hospital F/U     Bleeding   History of Present Illness  Prudencio Bautista is an 85-year-old female with small bowel arteriovenous malformations and a history of stroke who presents with ongoing gastrointestinal bleeding.    She has a history of arteriovenous malformations (AVMs) in the small intestine and colon, leading to gastrointestinal bleeding. She experiences daily melena, which was confirmed during a recent hospitalization for pneumonia. Blood transfusions are required approximately every 10 days to manage her hemoglobin levels, with recent transfusions on May 20th and May 12th, 2025. Her iron levels were found to be low during a recent blood test.    Following a stroke, she was placed on 81 mg of aspirin, which may contribute to her bleeding issues. She is currently on monthly octreotide injections to manage the bleeding, which she tolerates well without side effects.    She also has a history of rheumatoid arthritis and takes Tylenol Arthritis (500 mg) twice daily for pain management. She is on 5 mg of prednisone daily and has recently started Cimzia for her arthritis, although her treatment was interrupted due to hospitalization.    She has not experienced wheezing or asthma symptoms since her last pneumonia episode a few months ago, except for a brief episode of wheezing two to three weeks ago while hospitalized.        HISTORY:  Past Medical History[1]   Past Surgical History[2]   Family History[3]   Social History: Short Social Hx on File[4]     Medications (Active prior to today's visit):  Current Medications[5]    Allergies:  Allergies[6]      ROS:   The patient denies any chest pain or shortness of breath,  No neurologic or dermatologic symptoms.     PHYSICAL EXAM:   Blood pressure 132/65, pulse 91, height 5' 1\" (1.549 m), weight 141 lb (64 kg).    The patient appears their stated age and  is in no acute distress  HEENT- anicteric sclera, neck no lymphadnopathy, OP- clear with no masses or lesions  Chest- Clear bilaterally, no wheezing,  Heart- regular rate, no murmur or gallop  Abdomen- Soft and nontender, nondistended  Ext- no clubbing or cyanosis  Skin- no rashes or lesions  Neuro- appropriate response, alert, no confusion  .  ASSESSMENT/PLAN:     Assessment & Plan  Gastrointestinal bleeding due to AVMs  Chronic GI bleeding from AVMs in the small intestine and possibly colon, causing melena and fluctuating hemoglobin. Managed with octreotide. Endoscopic evaluation and balloon enteroscopy considered for further exploration.  - Continue monthly octreotide injections.  - Avoid anticoagulants.  - Consider endoscopic evaluation for AVM identification and cauterization.  - Consult specialist at [facility] for balloon enteroscopy.  - Coordinate specialist consultation with nurse.    Anemia due to chronic blood loss  Anemia from chronic GI bleeding, requiring frequent transfusions and iron supplementation. Goal to reduce transfusion frequency with iron therapy.  - Coordinate with Dr. Mohamud for regular iron infusions, possibly monthly.  - Monitor hemoglobin during iron infusions to assess transfusion need.  - Aim to reduce transfusion frequency to less than once a month with iron supplementation.    Stroke  Previous stroke managed with anticoagulants, now avoided due to GI bleeding risk.    Rheumatoid arthritis  Chronic RA managed with Tylenol Arthritis, low-dose prednisone, and Cimzia. Plan to discontinue prednisone.  - Continue Tylenol Arthritis 500 mg twice daily.  - Continue prednisone 5 mg daily.  - Coordinate with rheumatology to resume Cimzia injections.    Pneumonia  Recent pneumonia resolved with no current respiratory symptoms.    Follow-up  Coordination of care and management of ongoing issues discussed.  - Coordinate with Dr. Mohamud for iron infusion scheduling and blood count monitoring.  - Set up  consultation with specialist at [facility] for potential balloon enteroscopy.  - Ensure follow-up with rheumatology for Cimzia administration.             Orders This Visit:  No orders of the defined types were placed in this encounter.      Meds This Visit:  Requested Prescriptions      No prescriptions requested or ordered in this encounter       Imaging & Referrals:  None       Mikey Garcia MD  [facility]              [1]   Past Medical History:   Anxiety state    Cancer (HCC)    breast cancer 2018    Cerebellar stroke (HCC)    Chronic obstructive pulmonary disease, unspecified (HCC)    Chronic obstructive pulmonary disease, unspecified (HCC)    COPD (chronic obstructive pulmonary disease) (HCC)    Cystic thyroid nodule    Diabetes (HCC)    Diabetes mellitus (HCC)    Essential hypertension    Exposure to medical diagnostic radiation    High blood pressure    High cholesterol    History of blood transfusion    low hemoglobin    Osteoarthritis    PONV (postoperative nausea and vomiting)    Pulmonary embolism (HCC)    Pulmonary emphysema (HCC)    Rheumatoid arthritis (HCC)    Stroke (HCC)   [2]   Past Surgical History:  Procedure Laterality Date    Cataract extraction w/  intraocular lens implant Bilateral 2017    Dr in [facility]     Colonoscopy      Colonoscopy N/A 07/21/2022    Procedure: COLONOSCOPY;  Surgeon: Mikey Garcia MD;  Location: [facility] ENDOSCOPY    Colonoscopy N/A 06/15/2023    Procedure: COLONOSCOPY;  Surgeon: Mikey Garcia MD;  Location: [facility] ENDOSCOPY    Correct bunion,simple      right foot  1993    Egd  12/21/2023    Dr. Garcia; Duodenal AVM's x4 cauterized    Hysterectomy      1972, no abnormal Paps, due to heavy bleeding with fibroids.  Not sure if it had bilateral salpingo-oophorectomy.    Ir aneurysm repairm  2010    Computer assisted volumetric craniofomy with clipping of anterior cerebral artery.    Lumpectomy right      Radiation right      Rotator cuff repair           Total knee replacement Right     Total knee replacement Left 2015    Transoral incisionless fundoplication - internal  2012 Fredy fundoplication at [facility] Dr. Fournier.    Fredy fundoplication at [facility] Dr. Fournier.    Trigger finger release      left hand      Yag capsulotomy - ou - both eyes Bilateral 2021    done in [place]   [3]   Family History  Problem Relation Age of Onset    Heart Surgery Mother         Leaky valve at 39 y/o.     Prostate Cancer Brother     Cancer Brother     Diabetes Maternal Grandmother     Diabetes Paternal Aunt     Breast Cancer Self 78    Breast Cancer Niece         before 50    Macular degeneration Neg     Glaucoma Neg    [4]   Social History  Socioeconomic History    Marital status:    Tobacco Use    Smoking status: Former     Current packs/day: 0.00     Types: Cigarettes     Quit date:      Years since quittin.     Passive exposure: Past    Smokeless tobacco: Never    Tobacco comments:     [id number]   Vaping Use    Vaping status: Never Used   Substance and Sexual Activity    Alcohol use: Never    Drug use: Never   Social History Narrative    Just moved in from [place].   passed away and that is why moved from [place] to be with daughter who lives in [place].  Currently lives with daughter.     Social Drivers of Health     Food Insecurity: No Food Insecurity (2025)    NCSS - Food Insecurity     Worried About Running Out of Food in the Last Year: No     Ran Out of Food in the Last Year: No   Transportation Needs: No Transportation Needs (2025)    NCSS - Transportation     Lack of Transportation: No   Housing Stability: Not At Risk (2025)    NCSS - Housing/Utilities     Has Housing: Yes     Worried About Losing Housing: No     Unable to Get Utilities: No   [5]   Current Outpatient Medications   Medication Sig Dispense Refill    glipiZIDE 2.5 MG Oral Tab Take 2.5 mg by mouth  every 12 (twelve) hours. 60 tablet 1    aspirin (ASPIRIN LOW DOSE) 81 MG Oral Tab EC Take 1 tablet (81 mg total) by mouth in the morning. 90 tablet 3    pantoprazole 40 MG Oral Tab EC Take 1 tablet (40 mg total) by mouth in the morning and 1 tablet (40 mg total) in the evening. Take before meals. 60 tablet 0    carvedilol 25 MG Oral Tab Take 1 tablet (25 mg total) by mouth 2 (two) times daily with meals. 60 tablet 1    rOPINIRole 0.25 MG Oral Tab Take 1 tablet (0.25 mg total) by mouth every evening. 30 tablet 1    hydrALAZINE 25 MG Oral Tab Take 1 tablet (25 mg total) by mouth every 8 (eight) hours. 90 tablet 0    fluticasone-salmeterol 250-50 MCG/ACT Inhalation Aerosol Powder, Breath Activated Inhale 1 puff into the lungs 2 (two) times daily. 60 each 0    LANTUS SOLOSTAR 100 UNIT/ML Subcutaneous Solution Pen-injector Inject 10 Units into the skin every morning.      losartan 100 MG Oral Tab Take 1 tablet (100 mg total) by mouth daily. 30 tablet 0    benzonatate 200 MG Oral Cap Take 1 capsule (200 mg total) by mouth 3 (three) times daily as needed for cough. 15 capsule 0    gabapentin 100 MG Oral Cap Take 1 capsule (100 mg total) by mouth nightly.      insulin lispro 100 UNIT/ML Injection Solution Inject 3 Units into the skin in the morning and 3 Units at noon and 3 Units in the evening. Inject before meals. Sliding scale depending on blood sugar.      ipratropium 0.02 % Inhalation Solution Take 2.5 mL (500 mcg total) by nebulization 2 (two) times daily. 187.5 mL 2    lidocaine-menthol 4-1 % External Patch Place 1 patch onto the skin in the morning.      acetaminophen 325 MG Oral Tab Take 2 tablets (650 mg total) by mouth every 6 (six) hours as needed.      rosuvastatin 20 MG Oral Tab Take 1 tablet (20 mg total) by mouth nightly. 90 tablet 3    magnesium oxide 400 MG Oral Tab Take 1 tablet (400 mg total) by mouth 2 (two) times daily. Per Dr. JENSEN Mata      predniSONE 5 MG Oral Tab Take 1 tablet (5 mg total) by mouth  daily. 90 tablet 1    albuterol 108 (90 Base) MCG/ACT Inhalation Aero Soln TAKE 2 PUFFS BY MOUTH EVERY 4 TO 6 HOURS AS NEEDED 6.7 each 5    BD PEN NEEDLE KADEN 2ND GEN 32G X 4 MM Does not apply Misc 1 EACH DAILY. USE DAILY WITH INSULIN 100 each 1    montelukast 10 MG Oral Tab Take 1 tablet (10 mg total) by mouth nightly. 90 tablet 3    Blood Glucose Monitoring Suppl (BLOOD GLUCOSE SYSTEM GILLES) Does not apply Kit Dx. E11.9. Checks qam. 1 kit 0    Blood Glucose Monitoring Suppl w/Device Does not apply Kit Dx. E11.9. Checks qam. 1 kit 0    Blood Gluc Meter Disp-Strips Does not apply Device Dx. E11.9. Checks qam. 100 each 0    Accu-Chek FastClix Lancets Does not apply Misc Check sugar once daily as directed (E11.42) 100 each 2    Blood Glucose Monitoring Suppl (ONETOUCH VERIO) w/Device Does not apply Kit 1 each daily. Test 1x daily 1 kit 0    OneTouch Delica Lancets 33G Does not apply Misc 4 x daily 100 each 1   [6]   Allergies  Allergen Reactions    Celebrex [Celecoxib] PALPITATIONS    Penicillins ITCHING and SWELLING    Amlodipine OTHER (SEE COMMENTS)     Calves Swelling     Metformin And Related UNKNOWN    Olmesartan UNKNOWN

## 2025-05-22 ENCOUNTER — APPOINTMENT (OUTPATIENT)
Age: 85
End: 2025-05-22
Attending: INTERNAL MEDICINE
Payer: MEDICARE

## 2025-05-22 NOTE — TELEPHONE ENCOUNTER
Please advise. If patient should restart cimbia. Was last admitted to ED on 5/13 receive blood transfusion.

## 2025-05-23 ENCOUNTER — OFFICE VISIT (OUTPATIENT)
Age: 85
End: 2025-05-23
Attending: INTERNAL MEDICINE
Payer: MEDICARE

## 2025-05-23 ENCOUNTER — TELEPHONE (OUTPATIENT)
Dept: INTERNAL MEDICINE CLINIC | Facility: CLINIC | Age: 85
End: 2025-05-23

## 2025-05-23 ENCOUNTER — TELEPHONE (OUTPATIENT)
Facility: CLINIC | Age: 85
End: 2025-05-23

## 2025-05-23 VITALS
BODY MASS INDEX: 26.43 KG/M2 | RESPIRATION RATE: 20 BRPM | HEIGHT: 61 IN | OXYGEN SATURATION: 93 % | DIASTOLIC BLOOD PRESSURE: 53 MMHG | HEART RATE: 86 BPM | SYSTOLIC BLOOD PRESSURE: 154 MMHG | TEMPERATURE: 98 F | WEIGHT: 140 LBS

## 2025-05-23 VITALS
DIASTOLIC BLOOD PRESSURE: 75 MMHG | OXYGEN SATURATION: 94 % | SYSTOLIC BLOOD PRESSURE: 183 MMHG | HEART RATE: 86 BPM | TEMPERATURE: 98 F | RESPIRATION RATE: 18 BRPM

## 2025-05-23 DIAGNOSIS — D50.0 IRON DEFICIENCY ANEMIA DUE TO CHRONIC BLOOD LOSS: ICD-10-CM

## 2025-05-23 DIAGNOSIS — K92.1 MELENA: ICD-10-CM

## 2025-05-23 DIAGNOSIS — K90.9 MALABSORPTION OF IRON (HCC): ICD-10-CM

## 2025-05-23 DIAGNOSIS — K31.819 GASTRIC AVM: ICD-10-CM

## 2025-05-23 DIAGNOSIS — K55.21 AVM (ARTERIOVENOUS MALFORMATION) OF SMALL BOWEL, ACQUIRED WITH HEMORRHAGE: ICD-10-CM

## 2025-05-23 DIAGNOSIS — D50.9 IRON DEFICIENCY ANEMIA, UNSPECIFIED IRON DEFICIENCY ANEMIA TYPE: ICD-10-CM

## 2025-05-23 DIAGNOSIS — K55.20 ARTERIOVENOUS MALFORMATION OF DIGESTIVE SYSTEM VESSEL: Primary | ICD-10-CM

## 2025-05-23 DIAGNOSIS — D62 ACUTE BLOOD LOSS ANEMIA: ICD-10-CM

## 2025-05-23 DIAGNOSIS — D50.0 IRON DEFICIENCY ANEMIA DUE TO CHRONIC BLOOD LOSS: Primary | ICD-10-CM

## 2025-05-23 DIAGNOSIS — D64.9 ACUTE ON CHRONIC ANEMIA: ICD-10-CM

## 2025-05-23 DIAGNOSIS — D50.9 IRON DEFICIENCY ANEMIA, UNSPECIFIED IRON DEFICIENCY ANEMIA TYPE: Primary | ICD-10-CM

## 2025-05-23 DIAGNOSIS — I10 PRIMARY HYPERTENSION: ICD-10-CM

## 2025-05-23 LAB
ANTIBODY SCREEN: NEGATIVE
BASOPHILS # BLD AUTO: 0.04 X10(3) UL (ref 0–0.2)
BASOPHILS NFR BLD AUTO: 0.4 %
DEPRECATED RDW RBC AUTO: 59.9 FL (ref 35.1–46.3)
EOSINOPHIL # BLD AUTO: 0.4 X10(3) UL (ref 0–0.7)
EOSINOPHIL NFR BLD AUTO: 3.5 %
ERYTHROCYTE [DISTWIDTH] IN BLOOD BY AUTOMATED COUNT: 18 % (ref 11–15)
HCT VFR BLD AUTO: 22.3 % (ref 35–48)
HGB BLD-MCNC: 6.8 G/DL (ref 12–16)
IMM GRANULOCYTES # BLD AUTO: 0.04 X10(3) UL (ref 0–1)
IMM GRANULOCYTES NFR BLD: 0.4 %
LYMPHOCYTES # BLD AUTO: 1.27 X10(3) UL (ref 1–4)
LYMPHOCYTES NFR BLD AUTO: 11.2 %
MCH RBC QN AUTO: 27.4 PG (ref 26–34)
MCHC RBC AUTO-ENTMCNC: 30.5 G/DL (ref 31–37)
MCV RBC AUTO: 89.9 FL (ref 80–100)
MONOCYTES # BLD AUTO: 0.72 X10(3) UL (ref 0.1–1)
MONOCYTES NFR BLD AUTO: 6.4 %
NEUTROPHILS # BLD AUTO: 8.86 X10 (3) UL (ref 1.5–7.7)
NEUTROPHILS # BLD AUTO: 8.86 X10(3) UL (ref 1.5–7.7)
NEUTROPHILS NFR BLD AUTO: 78.1 %
PLATELET # BLD AUTO: 225 10(3)UL (ref 150–450)
RBC # BLD AUTO: 2.48 X10(6)UL (ref 3.8–5.3)
RH BLOOD TYPE: POSITIVE
WBC # BLD AUTO: 11.3 X10(3) UL (ref 4–11)

## 2025-05-23 RX ORDER — SODIUM CHLORIDE 9 MG/ML
10 INJECTION, SOLUTION INTRAMUSCULAR; INTRAVENOUS; SUBCUTANEOUS ONCE
OUTPATIENT
Start: 2025-05-24

## 2025-05-23 NOTE — TELEPHONE ENCOUNTER
I would address the aspirin issue with her primary care doctor, this is a low-dose aspirin but could be contributing to some of the increase in blood loss that we have seen recently and the need for transfusion.  I know she has history of stroke and by coming off the aspirin theoretically this could increase that risk.    RN to have the patient and/or her daughter call primary physician to see if they could hold the 81 mg of aspirin.

## 2025-05-23 NOTE — TELEPHONE ENCOUNTER
Daughter states within the last 10 days patient needed 2 blood transfusions.   Daughter asking for a call back to discuss.

## 2025-05-23 NOTE — TELEPHONE ENCOUNTER
Dr. Garcia    I spoke to Brea  The patient had 2 blood transfusions in 10 days and is now on her third because she had her iron infusion today and CBC showed low hemoglobin again of 6.8.    Daughter calling because you had mentioned getting her set up for the procedure at [facility] for this.  Do you want her to see [facility] for balloon enteroscopy?  If so, please sign off on referral and we can fax that and clinicals over the [facility] so she can get scheduled.    Also, daughter asking if she should keep taking the baby aspirin daily that she started when she had the stroke back in January since she keeps on bleeding and needing transfusions.    Thank you

## 2025-05-23 NOTE — TELEPHONE ENCOUNTER
Dr. Garcia    She is asking about the aspirin-should she stop the baby aspirin?  Please see my message below.    Thank you    Successful Fax confirmation received   I called Brea and gave her the phone number to set up an appointment with Dr. Gustafson.  I let her know that she will need to request an authorized referral from Dr. Sorto to Dr. Gustafson since she has an HMO plan for it to be valid for insurance purposes. She voiced understanding and will do this.

## 2025-05-23 NOTE — TELEPHONE ENCOUNTER
Signed referral  Please have them set up with Dr. Kai Gustafson who specializes in double balloon

## 2025-05-23 NOTE — PATIENT INSTRUCTIONS
Post Transfusion Instructions for Out-Patients    Most recipients of blood transfusions do not experience any adverse effects.  You may resume your normal activities 4 to 6 hours after your blood transfusion.  Occasionally, reactions of blood transfusions may be delayed (for several weeks).    Symptoms of a transfusion reaction can include:    Faintness  Weakness  Nausea  Vomiting  Shortness of breath  Chest pain  Back pain  Hives  Rash  Itch  Flushing  Fever  Chills  Jaundice (yellowish tint to eyes/skin)  Dark or red urine    Though these symptoms may not be related to the blood transfusions, they should be reported to your physician.  Contact both your physician and the laboratory Blood Bank (see information below) so that your symptoms can be thoroughly evaluated.    Your physician's name: Eric Mohamud MD and Courtney Gibbs  Your physician's phone number: [phone]    If your physician is unavailable, contact the Emergency Department.    [facility] Blood Bank: [phone]  [facility] Emergency Department: [phone]

## 2025-05-23 NOTE — PROGRESS NOTES
Prudencio to infusion today for Venofer 200mg (1 of 3) and 1u PRBC for HGB of 6.8. Pt denies any issues or concerns. Feeling a bit tired today, which is typical when she needs blood.    Transfusion consent completed. Pt appeared to tolerate blood transfusion well.  No S/S of adverse rxn noted at this time. Discharged from infusion in stable condition, ambulating w/ walker accompanied by daughter.     Ordering Provider: SHIV KHAN    Education Record  Learner:  Patient and Family Member  Barriers / Limitations:  None  Method:  Reinforcement  General Topics:  Plan of care reviewed  Outcome:  Shows understanding

## 2025-05-23 NOTE — TELEPHONE ENCOUNTER
She had a recent stroke in January 20, 2025.  I would have her check with neurology regarding the aspirin.

## 2025-05-24 LAB
BLOOD TYPE BARCODE: 7300
UNIT VOLUME: 350 ML

## 2025-05-29 ENCOUNTER — NURSE ONLY (OUTPATIENT)
Age: 85
End: 2025-05-29

## 2025-05-29 ENCOUNTER — OFFICE VISIT (OUTPATIENT)
Dept: PULMONOLOGY | Facility: CLINIC | Age: 85
End: 2025-05-29

## 2025-05-29 VITALS
BODY MASS INDEX: 25.94 KG/M2 | HEART RATE: 86 BPM | OXYGEN SATURATION: 91 % | WEIGHT: 137.38 LBS | RESPIRATION RATE: 12 BRPM | HEIGHT: 61 IN | SYSTOLIC BLOOD PRESSURE: 122 MMHG | DIASTOLIC BLOOD PRESSURE: 62 MMHG

## 2025-05-29 DIAGNOSIS — M05.79 RHEUMATOID ARTHRITIS INVOLVING MULTIPLE SITES WITH POSITIVE RHEUMATOID FACTOR (HCC): Primary | ICD-10-CM

## 2025-05-29 DIAGNOSIS — I26.99 BILATERAL PULMONARY EMBOLISM (HCC): Primary | ICD-10-CM

## 2025-05-29 PROCEDURE — 3078F DIAST BP <80 MM HG: CPT | Performed by: INTERNAL MEDICINE

## 2025-05-29 PROCEDURE — 3074F SYST BP LT 130 MM HG: CPT | Performed by: INTERNAL MEDICINE

## 2025-05-29 PROCEDURE — 3008F BODY MASS INDEX DOCD: CPT | Performed by: INTERNAL MEDICINE

## 2025-05-29 PROCEDURE — 1111F DSCHRG MED/CURRENT MED MERGE: CPT | Performed by: INTERNAL MEDICINE

## 2025-05-29 PROCEDURE — 99213 OFFICE O/P EST LOW 20 MIN: CPT | Performed by: INTERNAL MEDICINE

## 2025-05-29 PROCEDURE — 96372 THER/PROPH/DIAG INJ SC/IM: CPT | Performed by: INTERNAL MEDICINE

## 2025-05-29 PROCEDURE — 1126F AMNT PAIN NOTED NONE PRSNT: CPT | Performed by: INTERNAL MEDICINE

## 2025-05-29 PROCEDURE — 1159F MED LIST DOCD IN RCRD: CPT | Performed by: INTERNAL MEDICINE

## 2025-05-29 RX ORDER — CARVEDILOL 25 MG/1
25 TABLET ORAL 2 TIMES DAILY WITH MEALS
Qty: 180 TABLET | Refills: 3 | Status: SHIPPED | OUTPATIENT
Start: 2025-05-29

## 2025-05-29 NOTE — PROGRESS NOTES
Verified name and . Patient aware receiving Cimzia injection. Injections given in bilateral lower abdomen. Patient tolerated well. Patient aware to follow up in 1 month for next injection.    Future Appointments   Date Time Provider Department Center   2025  1:45 PM Chapin Parker MD ECWMOPSTEFANI [facility]   2025 12:30 PM ELM CC INFRN 3 [facility] SW [facility] [place]   2025  1:20 PM Joe Kang DO ENIOAKPARK2 [facility] [address]   2025  2:00 PM ELM CC LAB2 EL[facility] SW [facility] [place]   2025 10:30 AM Heike Sorto MD ECHNDIM EC [place]   2025  2:00 PM EC [facility] RHEUMATOLOGY ECWMORHEUM [facility]   2025  2:00 PM ELM CC LAB2 ELM SW [facility] [place]   7/10/2025  1:45 PM Vic Thakkar APRN ECWMOENDO [facility] MOB   2025  1:40 PM Ashia Goodrich MD ECWMORHDEEPAKM [facility]   8/15/2025  1:30 PM Heike Sorto MD ECHNDIM EC [place]   9/3/2025  1:00 PM Mikey Garcia MD ECCFHGAKAYA [facility]   2025 12:15 PM Chapin Parker MD ECWMOPULM [facility]   2025  1:30 PM Heike Sorto MD ECHNDIM [facility] [place]

## 2025-05-29 NOTE — TELEPHONE ENCOUNTER
Pt scheduled.      Future Appointments   Date Time Provider Department Center   5/29/2025  1:45 PM Chapin Parker MD ECWMOPULM [facility] [place] [facility]   5/30/2025 12:30 PM ELM CC INFRN 3 ELM SW [facility] [place]   6/2/2025  1:20 PM Joe Kang, DO ENIOAKPARK2 [facility] [address]   6/11/2025  2:00 PM ELM CC LAB2 ELM SW [facility] [place]   6/18/2025 10:30 AM Heike Sorto MD ECHNDIM [facility] [place]   7/7/2025  2:00 PM EC CF RN RHEUMATOLOGY ECWMORHEUM [facility] [place] [facility]   7/9/2025  2:00 PM ELM CC LAB2 ELM SW [facility] [place]   7/10/2025  1:45 PM Vic Thakkar, APRN ECWMOENDO [facility] [place] [facility]   8/6/2025  1:40 PM Ashia Goodrich MD ECWMORHEUM [facility] [place] [facility]   8/15/2025  1:30 PM Heike Sorto MD ECHNDIM [facility] [place]   9/3/2025  1:00 PM Mikey Garcia MD ECCFHGARO [facility]   9/18/2025 12:15 PM Chapin Parker MD ECWMOPULM [facility] [place] [facility]   12/19/2025  1:30 PM Heike Sorto MD ECHNDIM [facility] [place]

## 2025-05-29 NOTE — PROGRESS NOTES
The patient is an 85-year-old female who I know well from prior evaluation who comes in now for follow-up.  She had a hemoglobin of 6.8 recently and received further blood.  She remains on baby aspirin.  She has had stroke in the past as well as venous thromboembolism and has an IVC filter in place.  There is some dyspnea with long distance ambulation.    Review of Systems:  Vision normal. Ear nose and throat normal. Bowel normal. Bladder function normal. No depression. No thyroid disease. No lymphatic system concerns.  No rash. Muscles and joints unremarkable. No weight loss no weight gain.    Physical Examination:  Vital signs normal. HEENT examination is unremarkable with pupils equal round and reactive to light and accommodation. Neck without adenopathy, thyromegaly, JVD nor bruit. Lungs clear to auscultation and percussion. Cardiac regular rate and rhythm no murmur. Abdomen nontender, without hepatosplenomegaly and no mass appreciable. Extremities and Musculoskeletal without clubbing cyanosis nor edema, and mobility acceptable. Neurologic grossly intact with symmetric tone and strength and reflex.    Assessment and plan:  1.  Acute on chronic respiratory failure-COPD, severe chronic anemia.  [facility] is [facility] for possible long scope of the bowel tract.    Recommendations: Continue current medical regime, Advair, follow-up gastroenterology, hematology and rheumatology.    2.  History of multiple strokes with aortic arch thrombus status post stenting    3.  AVMs with history of GI lktir-yygdxd-[id number] GI and hematology    4.  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  He is unable to tolerate full anticoagulation.  See me again in 4 months or sooner if needed.

## 2025-05-30 ENCOUNTER — PATIENT OUTREACH (OUTPATIENT)
Dept: CASE MANAGEMENT | Age: 85
End: 2025-05-30

## 2025-05-30 ENCOUNTER — TELEPHONE (OUTPATIENT)
Age: 85
End: 2025-05-30

## 2025-05-30 ENCOUNTER — OFFICE VISIT (OUTPATIENT)
Age: 85
End: 2025-05-30
Attending: INTERNAL MEDICINE
Payer: MEDICARE

## 2025-05-30 VITALS
HEART RATE: 82 BPM | BODY MASS INDEX: 26 KG/M2 | SYSTOLIC BLOOD PRESSURE: 148 MMHG | DIASTOLIC BLOOD PRESSURE: 72 MMHG | RESPIRATION RATE: 16 BRPM | TEMPERATURE: 97 F | OXYGEN SATURATION: 91 % | WEIGHT: 137 LBS

## 2025-05-30 DIAGNOSIS — K90.9 MALABSORPTION OF IRON (HCC): ICD-10-CM

## 2025-05-30 DIAGNOSIS — D50.9 IRON DEFICIENCY ANEMIA, UNSPECIFIED IRON DEFICIENCY ANEMIA TYPE: ICD-10-CM

## 2025-05-30 DIAGNOSIS — K55.21 AVM (ARTERIOVENOUS MALFORMATION) OF SMALL BOWEL, ACQUIRED WITH HEMORRHAGE: ICD-10-CM

## 2025-05-30 DIAGNOSIS — D50.0 IRON DEFICIENCY ANEMIA DUE TO CHRONIC BLOOD LOSS: Primary | ICD-10-CM

## 2025-05-30 LAB
BASOPHILS # BLD AUTO: 0.04 X10(3) UL (ref 0–0.2)
BASOPHILS NFR BLD AUTO: 0.4 %
DEPRECATED RDW RBC AUTO: 60.9 FL (ref 35.1–46.3)
EOSINOPHIL # BLD AUTO: 0.19 X10(3) UL (ref 0–0.7)
EOSINOPHIL NFR BLD AUTO: 1.7 %
ERYTHROCYTE [DISTWIDTH] IN BLOOD BY AUTOMATED COUNT: 18.6 % (ref 11–15)
HCT VFR BLD AUTO: 25.2 % (ref 35–48)
HGB BLD-MCNC: 7.8 G/DL (ref 12–16)
IMM GRANULOCYTES # BLD AUTO: 0.05 X10(3) UL (ref 0–1)
IMM GRANULOCYTES NFR BLD: 0.5 %
LYMPHOCYTES # BLD AUTO: 0.61 X10(3) UL (ref 1–4)
LYMPHOCYTES NFR BLD AUTO: 5.6 %
MCH RBC QN AUTO: 28.2 PG (ref 26–34)
MCHC RBC AUTO-ENTMCNC: 31 G/DL (ref 31–37)
MCV RBC AUTO: 91 FL (ref 80–100)
MONOCYTES # BLD AUTO: 0.82 X10(3) UL (ref 0.1–1)
MONOCYTES NFR BLD AUTO: 7.5 %
NEUTROPHILS # BLD AUTO: 9.17 X10 (3) UL (ref 1.5–7.7)
NEUTROPHILS # BLD AUTO: 9.17 X10(3) UL (ref 1.5–7.7)
NEUTROPHILS NFR BLD AUTO: 84.3 %
PLATELET # BLD AUTO: 226 10(3)UL (ref 150–450)
RBC # BLD AUTO: 2.77 X10(6)UL (ref 3.8–5.3)
WBC # BLD AUTO: 10.9 X10(3) UL (ref 4–11)

## 2025-05-30 RX ORDER — ROPINIROLE 0.25 MG/1
0.25 TABLET, FILM COATED ORAL EVERY EVENING
Qty: 90 TABLET | Refills: 1 | Status: SHIPPED | OUTPATIENT
Start: 2025-05-30

## 2025-05-30 RX ORDER — SODIUM CHLORIDE 9 MG/ML
10 INJECTION, SOLUTION INTRAMUSCULAR; INTRAVENOUS; SUBCUTANEOUS ONCE
OUTPATIENT
Start: 2025-05-31

## 2025-05-30 NOTE — PROGRESS NOTES
Pt here for Venofer 2/3 . Pt denies any issues or concerns.     Ordering Provider: Cade  Order Exp: After 1 more dose.     Pt tolerated infusion without difficulty or complaint. No s&s of reaction noted. Port flushed with 20ml of saline and de-accessed, gauze and paper tape applied.  Reviewed next apt date/time: Patient said she will have her daughter call Monday to schedule her last iron infusion.       Education Record  Learner:  Patient and Family Member  Disease / Diagnosis: BUZZ  Barriers / Limitations:  None  Method:  Reinforcement  General Topics:  Plan of care reviewed  Outcome:  Shows understanding

## 2025-05-30 NOTE — PROGRESS NOTES
[facility] Follow-up Assessment    General:  Assessment completed with: Children  Chief Complaint: DISCHARGE DX: Principal Problem:    Acute hypoxic respiratory failure (HCC)  Active Problems:    COPD (chronic obstructive pulmonary disease) (HCC)    Community acquired pneumonia, unspecified laterality    Acute on chronic anemia    Progress/Care Plan:  Is the patient progressing as planned?: Yes  Care Plan Update: Dtr confirms pt did see  yesterday and had iron infusion today--labs were stable--did not need blood transfusion.  New Care Plan: Dtr Brea reports pt still fatigued, sleeping well, appetite good, tolerating medications, as prescribed. Dr. Garcia's office is assisting dtr with getting pt an appt with [facility] for endoscopy, dtr will discuss with Dr. Kang at upcoming appt RE: stopping or continuing baby aspirin.  Frequency/Follow Up Plan: program completion call, next week     Notes:  Navigator Notes: Dtr confirms specialist and PCP appts, as scheduled, outpt rehab still on hold. Dtr denies pt with any fever, chills, headache, vision changes, dizziness, nausea, vomiting, diarrhea, intractable, wild bleeding, irregular heartbeat or fast pulse, loss of vision, speech or strength or coordination in any body part, chest pain or shortness of breath at this time. Dtr aware when to contact PCP/specialists and when to seek emergency care. No further questions/concerns at this time.         Future Appointments   Date Time Provider Department Center   6/2/2025  1:20 PM Joe Kang DO ENIOAKPARK2 [facility] [address]   6/11/2025  2:00 PM ELM CC LAB2 ELM SW [facility] [place]   6/11/2025  3:00 PM Courtney Gibbs APRN ELMSW HemOnc [place]   6/18/2025 10:30 AM Heike Sorto MD ECHNDIM EC [place]   7/7/2025  2:00 PM EC OSMANY RN RHEUMATOLOGY ECWMORHEUSHIV [facility]   7/9/2025  2:00 PM ELM CC LAB2 ELM SW [facility] [place]   7/10/2025  1:45 PM Vic Thakkar APRN ECWMOENDO [facility]   8/6/2025  1:40 PM Joleen  MD DEYANIRA AngWMORHHARSHA [facility]   8/15/2025  1:30 PM Heike Sorto MD ECHNDIM EC [place]   9/3/2025  1:00 PM Mikey Garcia MD ECCFHGASTNICHOL [facility]   9/18/2025 12:15 PM Chapin Parker MD ECWMOPULSHIV [facility]   12/19/2025  1:30 PM Heike Sorto MD ECHNDIM  [place]

## 2025-05-30 NOTE — PROGRESS NOTES
[facility] Progress Note    Patient Name: Chester Bautista   YOB: 1940   Medical Record Number: [id number]   [facility] [id number]  Date: 5/23/25    Chief Complaint:  BUZZ, hx of PE and AVM      Oncology History:  85 year old with chronic iron deficiency anemia in the setting of bleeding AVMs s/p repeat endoscopic treatment, Iv iron treatment and recent RBC transfusion for hgb of 6 on 5/2022. EGD/CLN Dr Garcia 7/2022 noted for 2 AVM in duodenum and 1 AVM in the colon s/p treatment.  There is ongoing concern for more AVMs as well need for chronic monitoring, infusions etc.      History is noted for RA managed by Dr Goodrich, hepatitis B core antibody positivity, and remote history of breast cancer (records not available).     Pt had an infusion 2/2023 injectafer.   5/19/23: reported to ED due to rectal bleeding and abd pain, has CLN planned.    In early 2/24 rec for ER eval in light of the critically HTN. She was treated for pneumonia. Prudencio was then found to have PE and GI bleeding. She was treated with IV heparin and required cauterization twice for GI bleeding, so never started on Apixaban and held from anticoagulation.    Received IV iron in 4/24  Prudencio underwent bidirectional scoping with Dr. Aundrea Garcia in GI in June 2023 were several areas of AVM were cauterized.     Getting monthly octreotide with Dr. Garcia    She last completed IV iron 3 months ago 10/9-10/31/24 (Venofer)    5/23/25  Interim  Patient here with daughter Brea, she presents for f/u of BUZZ.  Continues with fatigue since last visit.  She reports occasional dark stools. Denies prbpr today. She denies bruising, denies blood loss from any orifice that she can see. Continues on octreotide.       Past Medical History: history of breast cancer, BUZZ, bleeding AVM, diabetes, HTN, HLD, RA  Past Surgical History: Hysterectomy 1972, IR aneurysm repair, right lumpectomy, knee replacements  Family History: neg for  malignancy  Social History: lives with naif, able to do all ADLs, no smoking or ETOH    Current Outpatient Medications:     glipiZIDE 2.5 MG Oral Tab, Take 2.5 mg by mouth every 12 (twelve) hours., Disp: 60 tablet, Rfl: 1    aspirin (ASPIRIN LOW DOSE) 81 MG Oral Tab EC, Take 1 tablet (81 mg total) by mouth in the morning., Disp: 90 tablet, Rfl: 3    pantoprazole 40 MG Oral Tab EC, Take 1 tablet (40 mg total) by mouth in the morning and 1 tablet (40 mg total) in the evening. Take before meals., Disp: 60 tablet, Rfl: 0    rOPINIRole 0.25 MG Oral Tab, Take 1 tablet (0.25 mg total) by mouth every evening., Disp: 30 tablet, Rfl: 1    hydrALAZINE 25 MG Oral Tab, Take 1 tablet (25 mg total) by mouth every 8 (eight) hours., Disp: 90 tablet, Rfl: 0    fluticasone-salmeterol 250-50 MCG/ACT Inhalation Aerosol Powder, Breath Activated, Inhale 1 puff into the lungs 2 (two) times daily., Disp: 60 each, Rfl: 0    LANTUS SOLOSTAR 100 UNIT/ML Subcutaneous Solution Pen-injector, Inject 10 Units into the skin every morning., Disp: , Rfl:     losartan 100 MG Oral Tab, Take 1 tablet (100 mg total) by mouth daily., Disp: 30 tablet, Rfl: 0    benzonatate 200 MG Oral Cap, Take 1 capsule (200 mg total) by mouth 3 (three) times daily as needed for cough., Disp: 15 capsule, Rfl: 0    gabapentin 100 MG Oral Cap, Take 1 capsule (100 mg total) by mouth nightly., Disp: , Rfl:     insulin lispro 100 UNIT/ML Injection Solution, Inject 3 Units into the skin in the morning and 3 Units at noon and 3 Units in the evening. Inject before meals. Sliding scale depending on blood sugar., Disp: , Rfl:     ipratropium 0.02 % Inhalation Solution, Take 2.5 mL (500 mcg total) by nebulization 2 (two) times daily., Disp: 187.5 mL, Rfl: 2    lidocaine-menthol 4-1 % External Patch, Place 1 patch onto the skin in the morning., Disp: , Rfl:     acetaminophen 325 MG Oral Tab, Take 2 tablets (650 mg total) by mouth every 6 (six) hours as needed., Disp: , Rfl:      rosuvastatin 20 MG Oral Tab, Take 1 tablet (20 mg total) by mouth nightly., Disp: 90 tablet, Rfl: 3    magnesium oxide 400 MG Oral Tab, Take 1 tablet (400 mg total) by mouth 2 (two) times daily. Per Dr. JENSEN Mata, Disp: , Rfl:     predniSONE 5 MG Oral Tab, Take 1 tablet (5 mg total) by mouth daily., Disp: 90 tablet, Rfl: 1    albuterol 108 (90 Base) MCG/ACT Inhalation Aero Soln, TAKE 2 PUFFS BY MOUTH EVERY 4 TO 6 HOURS AS NEEDED, Disp: 6.7 each, Rfl: 5    BD PEN NEEDLE KADEN 2ND GEN 32G X 4 MM Does not apply Misc, 1 EACH DAILY. USE DAILY WITH INSULIN, Disp: 100 each, Rfl: 1    montelukast 10 MG Oral Tab, Take 1 tablet (10 mg total) by mouth nightly., Disp: 90 tablet, Rfl: 3    Blood Glucose Monitoring Suppl (BLOOD GLUCOSE SYSTEM GILLES) Does not apply Kit, Dx. E11.9. Checks qam., Disp: 1 kit, Rfl: 0    Blood Glucose Monitoring Suppl w/Device Does not apply Kit, Dx. E11.9. Checks qam., Disp: 1 kit, Rfl: 0    Blood Gluc Meter Disp-Strips Does not apply Device, Dx. E11.9. Checks qam., Disp: 100 each, Rfl: 0    Accu-Chek FastClix Lancets Does not apply Misc, Check sugar once daily as directed (E11.42), Disp: 100 each, Rfl: 2    Blood Glucose Monitoring Suppl (ONETOUCH VERIO) w/Device Does not apply Kit, 1 each daily. Test 1x daily, Disp: 1 kit, Rfl: 0    OneTouch Delica Lancets 33G Does not apply Misc, 4 x daily, Disp: 100 each, Rfl: 1    carvedilol 25 MG Oral Tab, Take 1 tablet (25 mg total) by mouth 2 (two) times daily with meals., Disp: 180 tablet, Rfl: 3    Allergies:  Allergies   Allergen Reactions    Celebrex [Celecoxib] PALPITATIONS    Penicillins ITCHING and SWELLING    Amlodipine OTHER (SEE COMMENTS)     Calves Swelling     Metformin And Related UNKNOWN    Olmesartan UNKNOWN        Review of Systems:Oncology specific ROS negative except as per HPI    /53 (BP Location: Left arm, Patient Position: Sitting, Cuff Size: adult)   Pulse 86   Temp 97.9 °F (36.6 °C) (Oral)   Resp 20   Ht 1.549 m (5' 1\")   Wt 63.5  kg (140 lb)   SpO2 93%   BMI 26.45 kg/m²   Wt Readings from Last 6 Encounters:   05/29/25 62.3 kg (137 lb 6.4 oz)   05/23/25 63.5 kg (140 lb)   05/21/25 64 kg (141 lb)   05/13/25 67.1 kg (148 lb)   05/13/25 65.3 kg (144 lb)   04/29/25 63.4 kg (139 lb 12.8 oz)     PE  General: Patient is alert and oriented x 3, not in acute distress. Steady gait.  HEENT: EOMI, MMM, No LAD russell cervical or supraclavicular, neck supple  Chest: Clear BS to auscultation bilaterally, no wob  Abdomen: soft, non tender, non distended  Extremities: no signs of LE edema  Neurological: Strength is grossly intact. Moving all extremities. Gait appropriate.   Psych/Depression: Appropriate mood and affect            Lab Results   Component Value Date    WBC 11.3 (H) 05/23/2025    RBC 2.48 (L) 05/23/2025    HGB 6.8 (LL) 05/23/2025    HCT 22.3 (L) 05/23/2025    MCV 89.9 05/23/2025    MCH 27.4 05/23/2025    MCHC 30.5 (L) 05/23/2025    RDW 18.0 (H) 05/23/2025    .0 05/23/2025     Lab Results   Component Value Date     (H) 05/13/2025    BUN 22 05/13/2025    BUNCREA 20.6 (H) 05/13/2025    CREATSERUM 1.07 (H) 05/13/2025    ANIONGAP 6 05/13/2025    GFRNAA 47 (L) 05/02/2022    GFRAA 54 (L) 05/02/2022    CA 8.6 (L) 05/13/2025    OSMOCALC 299 (H) 05/13/2025    ALKPHO 73 05/12/2025    AST 58 (H) 05/12/2025    ALT 58 (H) 05/12/2025    BILT 0.3 05/12/2025    TP 6.0 05/12/2025    ALB 3.4 05/12/2025    GLOBULIN 2.6 05/12/2025     05/13/2025    K 3.7 05/13/2025     05/13/2025    CO2 27.0 05/13/2025       Lab Results   Component Value Date    ROSALIE 79 05/12/2025     Lab Results   Component Value Date    IRON 26 (L) 05/12/2025     Lab Results   Component Value Date    IRON 26 (L) 05/12/2025    TRANSFERRIN 199 (L) 05/12/2025    TIBCP 271 05/12/2025    SAT 10 (L) 05/12/2025       Impression and Plan:  85 year old with chronic iron deficiency anemia in the setting of bleeding AVMs s/p repeat endoscopic treatment, Iv iron treatment and rbc tfx  5/2022,    1.) BUZZ from AVM's    - previously CAPUTO has been a hallmark for symptomatic anemia for her, also has worsening fatigue and some lightheadedness  --pt received venofer in 4/24   --pt has improved symptoms with monthly octreotide by Dr. Garcia in GI for continued blood loss;   -11/22/24 Last IV iron 10/9-10/31/24 (Venofer)        -Patient reports intermittent dark stools, not endorsed tody. Continues on octreotide via GI  -goal is to keep Prudencio's iron saturation over 25% as well as ferritin in normal range and over 100 would be great;   -getting IV iron today Venofer  -addn HGB 6.8, to get RBC transfusion today 1unit  -cont labs (CBC) weekly and follow up in 2 weeks 6/11/25 Dr. Mohamud on same day as octreotide and then consider changing to follow up every 4 weeks  -repeat iron studies in 4 weeks (ferritin, iron tibc, cbc)(6/23/25)      2.) HTN  --better control, cont to f/u with her PCP  --improving    3) History of PE  --found on 2/23/24; not on anticoagulation in light of recent GI bleeding   --IVC filter placed on 3/7/24 by IR; she was rec for IVC filter retreival in 3-6 mo by IR, so this was removed  in 6/24  --her PE was noted to be resolved by CT chest on 4/26      4.) COPD/pulm fibrosis  -recent hospitalization and pneumonia 10/22/24, now resolved  -followed by Dr. Parker, now has nebulizer for albuterol      5.) UTI  -RESOLVED    MDM: Moderate Risk; ongoing likely GI bleeding from AVM's, HTN, acute PE, IVC filter placement    BILL Alexandra    [facility]  Bridget Chen [facility] [place]  [address]  [phone]

## 2025-06-02 ENCOUNTER — OFFICE VISIT (OUTPATIENT)
Dept: NEUROLOGY | Facility: CLINIC | Age: 85
End: 2025-06-02
Payer: MEDICARE

## 2025-06-02 DIAGNOSIS — I69.30 SEQUELA, POST-STROKE: Primary | ICD-10-CM

## 2025-06-02 PROCEDURE — 1160F RVW MEDS BY RX/DR IN RCRD: CPT | Performed by: OTHER

## 2025-06-02 PROCEDURE — 1111F DSCHRG MED/CURRENT MED MERGE: CPT | Performed by: OTHER

## 2025-06-02 PROCEDURE — 99214 OFFICE O/P EST MOD 30 MIN: CPT | Performed by: OTHER

## 2025-06-02 PROCEDURE — 1159F MED LIST DOCD IN RCRD: CPT | Performed by: OTHER

## 2025-06-03 ENCOUNTER — TELEPHONE (OUTPATIENT)
Dept: INTERNAL MEDICINE CLINIC | Facility: CLINIC | Age: 85
End: 2025-06-03

## 2025-06-03 RX ORDER — PANTOPRAZOLE SODIUM 40 MG/1
TABLET, DELAYED RELEASE ORAL
Qty: 180 TABLET | Refills: 0 | OUTPATIENT
Start: 2025-06-03

## 2025-06-04 ENCOUNTER — PATIENT OUTREACH (OUTPATIENT)
Dept: CASE MANAGEMENT | Age: 85
End: 2025-06-04

## 2025-06-04 NOTE — PROGRESS NOTES
Left message on mailbox for patient's dtr, Brea, to call nurse care manager back for transitions of care call.  Nurse care  information [phone] included in message. Records show pt did see Dr. Kang, as scheduled--see notes.      Future Appointments   Date Time Provider Department Center   6/11/2025  2:00 PM ELM CC LAB2 ELM SW Inf [facility]   6/11/2025  3:00 PM Courtney Gibbs, BILL ELMSW HemOnc [facility]   6/18/2025 10:30 AM Heike Sorto MD ECHNDIM EC [place]   7/7/2025  2:00 PM EC [facility] RHEUMATOLOGY [facility]   7/10/2025  1:45 PM Vic Thakkar APRN ECWMOENDO [facility]   7/10/2025  2:45 PM ELM CC LAB2 ELM SW Inf [facility]   7/24/2025  9:00 AM EMH CT RM3 EMH CT EM [place]   7/24/2025  9:30 AM EMH CT RM3 EMH CT EM [place]   8/6/2025  1:40 PM Ashia Goodrich MD ECWMORHEUM [facility]   8/13/2025 11:40 AM Joe Kang DO ENIELHUR [facility]   8/15/2025  1:30 PM Heike Sorto MD ECHNDIM EC [place]   9/3/2025  1:00 PM Mikey Garcia MD ECCFHGASTRO [facility]   9/18/2025 12:15 PM Chapin Parker MD ECWMOPULM [facility]   12/19/2025  1:30 PM Heike Sorto MD ECHNDIM EC [place]

## 2025-06-06 ENCOUNTER — TELEPHONE (OUTPATIENT)
Dept: INTERNAL MEDICINE CLINIC | Facility: CLINIC | Age: 85
End: 2025-06-06

## 2025-06-06 RX ORDER — LOSARTAN POTASSIUM 100 MG/1
100 TABLET ORAL DAILY
Qty: 90 TABLET | Refills: 3 | Status: ON HOLD | OUTPATIENT
Start: 2025-06-06

## 2025-06-06 NOTE — TELEPHONE ENCOUNTER
Called and spoke with patient daughter and identified full name and date of birth.MANUEL verified  Relayed Dr. Sorto message and patient voiced understanding with treatment plan  Daughter will upload next 2 weeks of blood pressure reading and send in [employer]

## 2025-06-06 NOTE — TELEPHONE ENCOUNTER
Dr Sorto,    Please advise. Thank you    Patient daughter calling (verified full name and date of birth).MANUEL verified  Daughter stated that patient has been on Losartan 100 mg daily since being discharged from recent hospitalization  Took her last dose in 6-4-25  Daughter does not know current blood pressure reading, but will send to you via [employer] once she sees patient later today  Daughter asking what dose of Losartan you want patient to be on and will need a new prescription sent to he pharmacy  Patient is out of her medication

## 2025-06-06 NOTE — TELEPHONE ENCOUNTER
[employer] message sent.   
Please have her check with GI about this medicine.  
Please review;   
Spoke to patients daughter that is on MANUEL- she will reach out to the Gastroenterologist. Patients daughter also had a question about patient losartan and if she should stay on the increased dose-transferred to the nurse line.   
[phone]

## 2025-06-06 NOTE — TELEPHONE ENCOUNTER
Please inform daughter that prescription has been sent.  She got it on discharge from the hospital May 13, 2025 with the losartan.  She should be on 100 a day.  Monitor blood pressures for the next 2 weeks and she needs to call us back in 2 weeks with some blood pressure readings.

## 2025-06-08 ENCOUNTER — HOSPITAL ENCOUNTER (INPATIENT)
Facility: HOSPITAL | Age: 85
LOS: 3 days | Discharge: HOME HEALTH | End: 2025-06-11
Attending: EMERGENCY MEDICINE
Payer: MEDICARE

## 2025-06-08 ENCOUNTER — HOSPITAL ENCOUNTER (INPATIENT)
Facility: HOSPITAL | Age: 85
LOS: 3 days | Discharge: HOME HEALTH | End: 2025-06-11
Attending: EMERGENCY MEDICINE | Admitting: HOSPITALIST
Payer: MEDICARE

## 2025-06-08 ENCOUNTER — APPOINTMENT (OUTPATIENT)
Dept: GENERAL RADIOLOGY | Facility: HOSPITAL | Age: 85
End: 2025-06-08
Attending: EMERGENCY MEDICINE
Payer: MEDICARE

## 2025-06-08 DIAGNOSIS — D64.9 ANEMIA, UNSPECIFIED TYPE: Primary | ICD-10-CM

## 2025-06-08 DIAGNOSIS — K92.1 MELENA: ICD-10-CM

## 2025-06-08 DIAGNOSIS — J81.0 ACUTE PULMONARY EDEMA (HCC): ICD-10-CM

## 2025-06-08 DIAGNOSIS — E87.70 HYPERVOLEMIA, UNSPECIFIED HYPERVOLEMIA TYPE: ICD-10-CM

## 2025-06-08 DIAGNOSIS — R09.02 HYPOXEMIA: ICD-10-CM

## 2025-06-08 DIAGNOSIS — K92.2 GASTROINTESTINAL HEMORRHAGE, UNSPECIFIED GASTROINTESTINAL HEMORRHAGE TYPE: ICD-10-CM

## 2025-06-08 LAB
ALBUMIN SERPL-MCNC: 3.4 G/DL (ref 3.2–4.8)
ALBUMIN/GLOB SERPL: 1.2 {RATIO} (ref 1–2)
ALP LIVER SERPL-CCNC: 60 U/L (ref 55–142)
ALT SERPL-CCNC: 14 U/L (ref 10–49)
ANION GAP SERPL CALC-SCNC: 9 MMOL/L (ref 0–18)
ANTIBODY SCREEN: NEGATIVE
APTT PPP: 30.4 SECONDS (ref 23–36)
AST SERPL-CCNC: 24 U/L (ref ?–34)
BASOPHILS # BLD AUTO: 0.04 X10(3) UL (ref 0–0.2)
BASOPHILS NFR BLD AUTO: 0.4 %
BILIRUB SERPL-MCNC: 0.4 MG/DL (ref 0.2–1.1)
BILIRUB UR QL: NEGATIVE
BUN BLD-MCNC: 13 MG/DL (ref 9–23)
BUN/CREAT SERPL: 13.4 (ref 10–20)
CALCIUM BLD-MCNC: 8.3 MG/DL (ref 8.7–10.4)
CHLORIDE SERPL-SCNC: 105 MMOL/L (ref 98–112)
CLARITY UR: CLEAR
CO2 SERPL-SCNC: 28 MMOL/L (ref 21–32)
COLOR UR: COLORLESS
CREAT BLD-MCNC: 0.97 MG/DL (ref 0.55–1.02)
DEPRECATED RDW RBC AUTO: 65.1 FL (ref 35.1–46.3)
EGFRCR SERPLBLD CKD-EPI 2021: 57 ML/MIN/1.73M2 (ref 60–?)
EOSINOPHIL # BLD AUTO: 0.27 X10(3) UL (ref 0–0.7)
EOSINOPHIL NFR BLD AUTO: 2.8 %
ERYTHROCYTE [DISTWIDTH] IN BLOOD BY AUTOMATED COUNT: 19.5 % (ref 11–15)
FLUAV + FLUBV RNA SPEC NAA+PROBE: NEGATIVE
FLUAV + FLUBV RNA SPEC NAA+PROBE: NEGATIVE
GLOBULIN PLAS-MCNC: 2.8 G/DL (ref 2–3.5)
GLUCOSE BLD-MCNC: 222 MG/DL (ref 70–99)
GLUCOSE BLDC GLUCOMTR-MCNC: 240 MG/DL (ref 70–99)
GLUCOSE BLDC GLUCOMTR-MCNC: 248 MG/DL (ref 70–99)
GLUCOSE UR-MCNC: NORMAL MG/DL
HCT VFR BLD AUTO: 22.2 % (ref 35–48)
HGB BLD-MCNC: 6.5 G/DL (ref 12–16)
HGB BLD-MCNC: 8.2 G/DL (ref 12–16)
HGB UR QL STRIP.AUTO: NEGATIVE
IMM GRANULOCYTES # BLD AUTO: 0.05 X10(3) UL (ref 0–1)
IMM GRANULOCYTES NFR BLD: 0.5 %
INR BLD: 1.05 (ref 0.8–1.2)
KETONES UR-MCNC: NEGATIVE MG/DL
LEUKOCYTE ESTERASE UR QL STRIP.AUTO: NEGATIVE
LYMPHOCYTES # BLD AUTO: 1.63 X10(3) UL (ref 1–4)
LYMPHOCYTES NFR BLD AUTO: 16.9 %
MAGNESIUM SERPL-MCNC: 1.2 MG/DL (ref 1.6–2.6)
MCH RBC QN AUTO: 27.2 PG (ref 26–34)
MCHC RBC AUTO-ENTMCNC: 29.3 G/DL (ref 31–37)
MCV RBC AUTO: 92.9 FL (ref 80–100)
MONOCYTES # BLD AUTO: 0.88 X10(3) UL (ref 0.1–1)
MONOCYTES NFR BLD AUTO: 9.1 %
NEUTROPHILS # BLD AUTO: 6.77 X10 (3) UL (ref 1.5–7.7)
NEUTROPHILS # BLD AUTO: 6.77 X10(3) UL (ref 1.5–7.7)
NEUTROPHILS NFR BLD AUTO: 70.3 %
NITRITE UR QL STRIP.AUTO: NEGATIVE
NT-PROBNP SERPL-MCNC: 1576 PG/ML (ref ?–450)
OSMOLALITY SERPL CALC.SUM OF ELEC: 301 MOSM/KG (ref 275–295)
PH UR: 7 [PH] (ref 5–8)
PLATELET # BLD AUTO: 328 10(3)UL (ref 150–450)
PLATELET MORPHOLOGY: NORMAL
POTASSIUM SERPL-SCNC: 2.9 MMOL/L (ref 3.5–5.1)
POTASSIUM SERPL-SCNC: 3.8 MMOL/L (ref 3.5–5.1)
PROT SERPL-MCNC: 6.2 G/DL (ref 5.7–8.2)
PROT UR-MCNC: NEGATIVE MG/DL
PROTHROMBIN TIME: 14.3 SECONDS (ref 11.6–14.8)
RBC # BLD AUTO: 2.39 X10(6)UL (ref 3.8–5.3)
RH BLOOD TYPE: POSITIVE
RSV RNA SPEC NAA+PROBE: NEGATIVE
SARS-COV-2 RNA RESP QL NAA+PROBE: NOT DETECTED
SODIUM SERPL-SCNC: 142 MMOL/L (ref 136–145)
SP GR UR STRIP: <1.005 (ref 1–1.03)
TROPONIN I SERPL HS-MCNC: 16 NG/L (ref ?–34)
UROBILINOGEN UR STRIP-ACNC: NORMAL
WBC # BLD AUTO: 9.6 X10(3) UL (ref 4–11)

## 2025-06-08 PROCEDURE — 99223 1ST HOSP IP/OBS HIGH 75: CPT | Performed by: INTERNAL MEDICINE

## 2025-06-08 PROCEDURE — 71045 X-RAY EXAM CHEST 1 VIEW: CPT | Performed by: EMERGENCY MEDICINE

## 2025-06-08 PROCEDURE — 99223 1ST HOSP IP/OBS HIGH 75: CPT | Performed by: HOSPITALIST

## 2025-06-08 PROCEDURE — 30233N1 TRANSFUSION OF NONAUTOLOGOUS RED BLOOD CELLS INTO PERIPHERAL VEIN, PERCUTANEOUS APPROACH: ICD-10-PCS | Performed by: HOSPITALIST

## 2025-06-08 RX ORDER — NICOTINE POLACRILEX 4 MG
30 LOZENGE BUCCAL
Status: DISCONTINUED | OUTPATIENT
Start: 2025-06-08 | End: 2025-06-11

## 2025-06-08 RX ORDER — ALBUTEROL SULFATE 90 UG/1
2 INHALANT RESPIRATORY (INHALATION) EVERY 4 HOURS PRN
Status: DISCONTINUED | OUTPATIENT
Start: 2025-06-08 | End: 2025-06-11

## 2025-06-08 RX ORDER — METOCLOPRAMIDE HYDROCHLORIDE 5 MG/ML
5 INJECTION INTRAMUSCULAR; INTRAVENOUS EVERY 8 HOURS PRN
Status: DISCONTINUED | OUTPATIENT
Start: 2025-06-08 | End: 2025-06-11

## 2025-06-08 RX ORDER — HYDRALAZINE HYDROCHLORIDE 20 MG/ML
10 INJECTION INTRAMUSCULAR; INTRAVENOUS EVERY 4 HOURS PRN
Status: DISCONTINUED | OUTPATIENT
Start: 2025-06-08 | End: 2025-06-11

## 2025-06-08 RX ORDER — FUROSEMIDE 10 MG/ML
40 INJECTION INTRAMUSCULAR; INTRAVENOUS ONCE
Status: COMPLETED | OUTPATIENT
Start: 2025-06-08 | End: 2025-06-08

## 2025-06-08 RX ORDER — MAGNESIUM OXIDE 400 MG/1
800 TABLET ORAL ONCE
Status: COMPLETED | OUTPATIENT
Start: 2025-06-08 | End: 2025-06-08

## 2025-06-08 RX ORDER — DEXTROSE MONOHYDRATE 50 MG/ML
INJECTION, SOLUTION INTRAVENOUS CONTINUOUS
Status: ACTIVE | OUTPATIENT
Start: 2025-06-09 | End: 2025-06-09

## 2025-06-08 RX ORDER — BENZONATATE 200 MG/1
200 CAPSULE ORAL 3 TIMES DAILY PRN
Status: DISCONTINUED | OUTPATIENT
Start: 2025-06-08 | End: 2025-06-11

## 2025-06-08 RX ORDER — POTASSIUM CHLORIDE 1500 MG/1
40 TABLET, EXTENDED RELEASE ORAL EVERY 4 HOURS
Status: COMPLETED | OUTPATIENT
Start: 2025-06-08 | End: 2025-06-08

## 2025-06-08 RX ORDER — ECHINACEA PURPUREA EXTRACT 125 MG
1 TABLET ORAL
Status: DISCONTINUED | OUTPATIENT
Start: 2025-06-08 | End: 2025-06-11

## 2025-06-08 RX ORDER — POTASSIUM CHLORIDE 1500 MG/1
40 TABLET, EXTENDED RELEASE ORAL ONCE
Status: COMPLETED | OUTPATIENT
Start: 2025-06-08 | End: 2025-06-08

## 2025-06-08 RX ORDER — HYDROCODONE BITARTRATE AND ACETAMINOPHEN 5; 325 MG/1; MG/1
2 TABLET ORAL EVERY 4 HOURS PRN
Status: DISCONTINUED | OUTPATIENT
Start: 2025-06-08 | End: 2025-06-11

## 2025-06-08 RX ORDER — POTASSIUM CHLORIDE 1500 MG/1
40 TABLET, EXTENDED RELEASE ORAL ONCE
Status: DISCONTINUED | OUTPATIENT
Start: 2025-06-08 | End: 2025-06-08

## 2025-06-08 RX ORDER — SODIUM PHOSPHATE, DIBASIC AND SODIUM PHOSPHATE, MONOBASIC 7; 19 G/230ML; G/230ML
1 ENEMA RECTAL ONCE AS NEEDED
Status: DISCONTINUED | OUTPATIENT
Start: 2025-06-08 | End: 2025-06-11

## 2025-06-08 RX ORDER — ROPINIROLE 0.25 MG/1
0.25 TABLET, FILM COATED ORAL EVERY EVENING
Status: DISCONTINUED | OUTPATIENT
Start: 2025-06-08 | End: 2025-06-11

## 2025-06-08 RX ORDER — HYDROCODONE BITARTRATE AND ACETAMINOPHEN 5; 325 MG/1; MG/1
1 TABLET ORAL EVERY 4 HOURS PRN
Status: DISCONTINUED | OUTPATIENT
Start: 2025-06-08 | End: 2025-06-11

## 2025-06-08 RX ORDER — ACETAMINOPHEN 325 MG/1
650 TABLET ORAL EVERY 4 HOURS PRN
Status: DISCONTINUED | OUTPATIENT
Start: 2025-06-08 | End: 2025-06-11

## 2025-06-08 RX ORDER — DEXTROSE MONOHYDRATE 25 G/50ML
50 INJECTION, SOLUTION INTRAVENOUS
Status: DISCONTINUED | OUTPATIENT
Start: 2025-06-08 | End: 2025-06-11

## 2025-06-08 RX ORDER — FLUTICASONE PROPIONATE AND SALMETEROL 250; 50 UG/1; UG/1
1 POWDER RESPIRATORY (INHALATION) 2 TIMES DAILY
Status: DISCONTINUED | OUTPATIENT
Start: 2025-06-08 | End: 2025-06-11

## 2025-06-08 RX ORDER — BISACODYL 10 MG
10 SUPPOSITORY, RECTAL RECTAL
Status: DISCONTINUED | OUTPATIENT
Start: 2025-06-08 | End: 2025-06-11

## 2025-06-08 RX ORDER — HYDROCORTISONE SODIUM SUCCINATE 100 MG/2ML
25 INJECTION INTRAMUSCULAR; INTRAVENOUS EVERY 8 HOURS SCHEDULED
Status: DISCONTINUED | OUTPATIENT
Start: 2025-06-08 | End: 2025-06-11

## 2025-06-08 RX ORDER — NICOTINE POLACRILEX 4 MG
15 LOZENGE BUCCAL
Status: DISCONTINUED | OUTPATIENT
Start: 2025-06-08 | End: 2025-06-11

## 2025-06-08 RX ORDER — ROSUVASTATIN CALCIUM 20 MG/1
20 TABLET, COATED ORAL NIGHTLY
Status: DISCONTINUED | OUTPATIENT
Start: 2025-06-08 | End: 2025-06-10

## 2025-06-08 RX ORDER — ACETAMINOPHEN 500 MG
500 TABLET ORAL EVERY 4 HOURS PRN
Status: DISCONTINUED | OUTPATIENT
Start: 2025-06-08 | End: 2025-06-11

## 2025-06-08 RX ORDER — POLYETHYLENE GLYCOL 3350 17 G/17G
17 POWDER, FOR SOLUTION ORAL DAILY PRN
Status: DISCONTINUED | OUTPATIENT
Start: 2025-06-08 | End: 2025-06-11

## 2025-06-08 RX ORDER — CARVEDILOL 25 MG/1
25 TABLET ORAL 2 TIMES DAILY WITH MEALS
Status: DISCONTINUED | OUTPATIENT
Start: 2025-06-08 | End: 2025-06-11

## 2025-06-08 RX ORDER — MAGNESIUM OXIDE 400 MG/1
400 TABLET ORAL 2 TIMES DAILY
Status: DISCONTINUED | OUTPATIENT
Start: 2025-06-08 | End: 2025-06-11

## 2025-06-08 RX ORDER — HYDRALAZINE HYDROCHLORIDE 25 MG/1
25 TABLET, FILM COATED ORAL EVERY 8 HOURS SCHEDULED
Status: DISCONTINUED | OUTPATIENT
Start: 2025-06-08 | End: 2025-06-10

## 2025-06-08 RX ORDER — MONTELUKAST SODIUM 10 MG/1
10 TABLET ORAL NIGHTLY
Status: DISCONTINUED | OUTPATIENT
Start: 2025-06-08 | End: 2025-06-11

## 2025-06-08 RX ORDER — INSULIN DEGLUDEC 100 U/ML
10 INJECTION, SOLUTION SUBCUTANEOUS DAILY
Status: DISCONTINUED | OUTPATIENT
Start: 2025-06-08 | End: 2025-06-10

## 2025-06-08 RX ORDER — SENNOSIDES 8.6 MG
17.2 TABLET ORAL NIGHTLY PRN
Status: DISCONTINUED | OUTPATIENT
Start: 2025-06-08 | End: 2025-06-11

## 2025-06-08 RX ORDER — GABAPENTIN 100 MG/1
100 CAPSULE ORAL NIGHTLY
Status: DISCONTINUED | OUTPATIENT
Start: 2025-06-08 | End: 2025-06-11

## 2025-06-08 RX ORDER — ONDANSETRON 2 MG/ML
4 INJECTION INTRAMUSCULAR; INTRAVENOUS EVERY 6 HOURS PRN
Status: DISCONTINUED | OUTPATIENT
Start: 2025-06-08 | End: 2025-06-11

## 2025-06-08 NOTE — H&P
[facility]    PATIENT'S NAME: CARLOS EDUARDO PARR   ATTENDING PHYSICIAN: Carole Richardson MD   PATIENT ACCOUNT#:   [id number]    LOCATION:  [address]  MEDICAL RECORD #:   [id number]       YOB: 1940  ADMISSION DATE:       06/08/2025    HISTORY AND PHYSICAL EXAMINATION    Please note complex medical decision-making was involved coordinating care with Dr. Severino, interviewing patient and with her permission her daughter, reviewing EKG tracings and chest x-ray images, admitting patient to the hospital.    CHIEF COMPLAINT:  Black stool and shortness of breath.    ADMITTING DIAGNOSIS:  Gastrointestinal bleed of uncertain origin probably related to arteriovenous malformations that she has had previously with critical anemia causing shortness of breath requiring emergent transfusion.    HISTORY OF PRESENT ILLNESS:  This is a very pleasant 85-year-old  female who looks much younger than her stated age who presents with a history of having recurrent AVM-related bleeding.  She has been more short of breath for the last week, worse when lying down, and had black stools, had decreased O2 saturations on room air in the 70s, and otherwise now will be getting blood transfusions and admitted to the hospital.  She will be seen by Cardiology, Pulmonary, and GI.    PAST MEDICAL HISTORY:  Significant for COPD with pulmonary fibrosis, diastolic dysfunction, CVA, type 2 diabetes, chronic kidney disease, arthritis, anxiety, hyperlipidemia, AV malformations, hypertension, rheumatoid arthritis, anemia of chronic kidney disease, chronic GI blood loss, subarachnoid hemorrhage, 2 cerebellar infarcts, shoulder rotator cuff tear.     PAST SURGICAL HISTORY:  Left artery stent; cataract procedures; IVC filter; total abdominal hysterectomy; right breast lumpectomy plus radiation; 2 total knee arthroplasties, 1 on each side; right foot bunionectomy; Nissen fundoplication.    MEDICATIONS:  At  home include Tylenol 650 every 6 hours as needed; Ventolin 2 puffs every 4 hours as needed; aspirin 81 mg daily; Tessalon Perles 200 mg 3 times a day as needed; Coreg 25 mg twice a day; certolizumab every 28 days which will be held, most recent dose was ; fluticasone 2 sprays each nostril twice a day; gabapentin 100 mg nightly; glipizide 2.5 mg every 12 hours; hydralazine 25 mg every 8 hours as needed; Humalog 3 units 3 times a day; atropine nebulizer every 12 hours as needed; Lantus 10 units daily; lidocaine patch to affected area, do not wear in MRI and apply to wherever it hurts; losartan 100 mg daily; Mag oxide 400 mg twice a day; Singulair 10 mg daily; Protonix 40 mg twice a day; prednisone taper, is currently now off of the taper, and 5 mg of prednisone daily; Requip 0.25 mg every evening; Crestor 20 mg nightly.    ALLERGIES:  Keflex, penicillin, metformin, and olmesartan gave her swelling of her mouth.     FAMILY HISTORY:  Father  at 81 of an MI in which she terms old age.  Mother  at 86 of heart disease and valve disorder.    SOCIAL HISTORY:  Quit smoking 30 years ago, had approximately a 30 pack-year history.  Does not drink alcohol.    REVIEW OF SYSTEMS:  She reports melenic stool, difficulty sleeping, especially in the last night with orthopnea-type symptoms.  No nausea or vomiting.  She is able to urinate.  She is not bleeding from any other part of her body.  She has no unusual rashes and other complaints in the 13 systems reviewed are negative.  Chronic right ankle swelling.      PHYSICAL EXAMINATION:    GENERAL:  Well-nourished, well-developed, friendly  female who appears much younger than her stated age and is in no distress.  VITAL SIGNS:  Temperature is 98.2, pulse 87, respiratory rate 19, blood pressure is 154/74.  She is 100% on 2L but on admission was 70% on room air.    HEENT:  Normocephalic, atraumatic.  Anicteric.  Oropharynx is moist.  NECK:  Mildly stiff in all  4 directions.  LUNGS:  Crackles in both bases.  HEART:  Normal S1, S2.  I hear an S3.  There is a soft systolic murmur.  ABDOMEN:  Soft and essentially nontender.  BACK:  No dorsal spine or CVA tenderness.  GENITOURINARY/RECTAL:  Not performed.   BREASTS:  Not performed.  EXTREMITIES:  No cyanosis.  There is edema of the right leg, which she says is chronic.    NEUROLOGIC:  Alert and oriented x3, friendly and cooperative, with no focal deficits, with 4/5 motor strength in her upper extremities.  Can move both lower extremities in the bed.   SKIN:  No obvious rashes or tattoos.  PSYCHIATRIC:  She is not unusually anxious or depressed.  LYMPHATICS:  No submandibular lymphadenopathy.    LABORATORY DATA:  Sodium 142, potassium 2.9, chloride 105, CO2 of 28, BUN 13, creatinine 0.97.  BNP is 1576.  White count is 9600, hemoglobin 6.5, platelets 328,000.    ASSESSMENT AND PLAN:    1.   Probably AVM causing GI bleed and GI blood-loss anemia, severe, requiring transfusion and causing dyspnea.  2.   Acute hypoxemic respiratory failure perhaps from COPD and anemia.  Continue oxygen.  3.   CHF.  Currently the type is unknown but it appears to me that it may be an acute on chronic diastolic congestive heart failure.  Await Cardiology opinion and diuretics.  4.   Hypertension.  Given bleeding, will continue medications.  5.   Rheumatoid arthritis.  We will start stress doses of steroids.  6.   Diabetes.  Will cover with sliding scale and D5 if she is made n.p.o.  7.   Code Status:  Full Code.  Discussed with patient during this hospitalization with her daughter present.    Dictated By Carole Richardson MD  d: 06/08/2025 14:55:39  t: 06/08/2025 15:23:20  Job [id number]  [facility]/

## 2025-06-08 NOTE — ED QUICK NOTES
Orders for admission, patient is aware of plan and ready to go upstairs. Any questions, please call ED RN Naye at extension [phone].     Patient Covid vaccination status: Fully vaccinated     COVID Test Ordered in ED: SARS-CoV-2/Flu A and B/RSV by PCR (GeneXpert)    COVID Suspicion at Admission: N/A    Running Infusions: Medication Infusions[1] None    Mental Status/LOC at time of transport: a&ox4    Other pertinent information:   CIWA score: N/A   NIH score:  N/A             [1]

## 2025-06-08 NOTE — CONSULTS
Chester Bautista Patient Status:  Emergency    1940 MRN [id number]   Location [facility] EMERGENCY DEPARTMENT Attending Carole Richardson,*   Hosp Day # 0 PCP Heike Sorto MD     Reason for Consultation:  CHF    History of Present Illness:  Chester Bautista is a a(n) 85 year old female with history of CHF with preserved ejection fraction, wall motion abnormalities and her last echocardiogram performed in the hospital, history of recurrent GI bleed secondary to AVM, COPD, left subclavian stenosis status post stenting in 2025 presented to hospital complains of shortness of breath and decreased oxygenation.  Patient was found to have a hemoglobin of 6.5    History:  Past Medical History[1]  Past Surgical History[2]  Family History[3]   reports that she quit smoking about 28 years ago. Her smoking use included cigarettes. She has been exposed to tobacco smoke. She has never used smokeless tobacco. She reports that she does not drink alcohol and does not use drugs.    Allergies:  Allergies[4]    Medications:  Current Hospital Medications[5]    Review of Systems:  A comprehensive review of systems was negative if not otherwise mention in above HPI.    /74   Pulse 87   Temp 98.2 °F (36.8 °C) (Oral)   Resp 19   Ht 5' 1\" (1.549 m)   Wt 138 lb (62.6 kg)   SpO2 100%   BMI 26.07 kg/m²   Temp (24hrs), Av.2 °F (36.8 °C), Min:98.2 °F (36.8 °C), Max:98.2 °F (36.8 °C)     No intake or output data in the 24 hours ending 25 1450  Wt Readings from Last 3 Encounters:   25 138 lb (62.6 kg)   25 137 lb (62.1 kg)   25 137 lb 6.4 oz (62.3 kg)       Physical Exam:   General: Alert and oriented x 3. No apparent distress. No respiratory or constitutional distress.  HEENT: Normocephalic, anicteric sclera, neck supple.  Neck: No JVD, carotids 2+, no bruits.  Cardiac: Regular rate and rhythm. S1, S2 normal. No murmur, pericardial rub, S3.  Lungs: Coarse  breath sounds  Abdomen: Soft, non-tender. BS-present.  Extremities: Without clubbing, cyanosis or edema.  Peripheral pulses are 2+.  Neurologic: Alert and oriented, normal affect.  Skin: Warm and dry.     Laboratory Data:  Lab Results   Component Value Date    WBC 9.6 06/08/2025    HGB 6.5 06/08/2025    HCT 22.2 06/08/2025    .0 06/08/2025    CREATSERUM 0.97 06/08/2025    BUN 13 06/08/2025     06/08/2025    K 2.9 06/08/2025     06/08/2025    CO2 28.0 06/08/2025     06/08/2025    CA 8.3 06/08/2025    ALB 3.4 06/08/2025    ALKPHO 60 06/08/2025    BILT 0.4 06/08/2025    TP 6.2 06/08/2025    AST 24 06/08/2025    ALT 14 06/08/2025    PTT 30.4 06/08/2025    INR 1.05 06/08/2025    PTP 14.3 06/08/2025         Impression:  Shortness of breath, likely multifactorial  Acute blood loss anemia likely GI  History of congestive heart failure with preserved ejection fraction, probable coronary disease  Subclavian artery stenosis status post stenting  History of COPD    Recommendations:  Transfuse as needed for goal hemoglobin greater than 7  Okay to give if 20 mg of Lasix IV daily following transfusions  Workup regarding anemia  Continue aspirin 81 mg daily.  Continue statin rosuvastatin 20 mg nightly  Resume home antihypertensive medication  Echocardiogram tomorrow    Thank you for allowing me to participate in the care of your patient.    Jacob Vale MD  6/8/2025  2:50 PM       [1]   Past Medical History:   Anxiety state    Cancer (HCC)    breast cancer 2018    Cerebellar stroke (HCC)    Chronic obstructive pulmonary disease, unspecified (HCC)    Chronic obstructive pulmonary disease, unspecified (HCC)    COPD (chronic obstructive pulmonary disease) (HCC)    Cystic thyroid nodule    Diabetes (HCC)    Diabetes mellitus (HCC)    Essential hypertension    Exposure to medical diagnostic radiation    High blood pressure    High cholesterol    History of blood transfusion    low hemoglobin    Osteoarthritis     PONV (postoperative nausea and vomiting)    Pulmonary embolism (HCC)    Pulmonary emphysema (HCC)    Rheumatoid arthritis (HCC)    Stroke (HCC)   [2]   Past Surgical History:  Procedure Laterality Date    Cataract extraction w/  intraocular lens implant Bilateral 2017    Dr in [facility]     Colonoscopy      Colonoscopy N/A 07/21/2022    Procedure: COLONOSCOPY;  Surgeon: Mikey Garcia MD;  Location: [facility] ENDOSCOPY    Colonoscopy N/A 06/15/2023    Procedure: COLONOSCOPY;  Surgeon: Mikey Garcia MD;  Location: [facility] ENDOSCOPY    Correct bunion,simple      right foot  1993    Egd  12/21/2023    Dr. Garcia; Duodenal AVM's x4 cauterized    Hysterectomy      1972, no abnormal Paps, due to heavy bleeding with fibroids.  Not sure if it had bilateral salpingo-oophorectomy.    Ir aneurysm repairm  2010    Computer assisted volumetric craniofomy with clipping of anterior cerebral artery.    Lumpectomy right      Radiation right      Rotator cuff repair      1993    Total knee replacement Right 2010    Total knee replacement Left 07/02/2015    Transoral incisionless fundoplication - internal  01/25/2012 Fredy fundoplication at [facility] Dr. Fournier.    Fredy fundoplication at [facility] Dr. Fournier.    Trigger finger release      left hand  2005    Yag capsulotomy - ou - both eyes Bilateral 09/2021    done in [place]   [3]   Family History  Problem Relation Age of Onset    Heart Surgery Mother         Leaky valve at 39 y/o.     Prostate Cancer Brother     Cancer Brother     Diabetes Maternal Grandmother     Diabetes Paternal Aunt     Breast Cancer Self 78    Breast Cancer Niece         before 50    Macular degeneration Neg     Glaucoma Neg    [4]   Allergies  Allergen Reactions    Celebrex [Celecoxib] PALPITATIONS    Penicillins ITCHING and SWELLING    Amlodipine OTHER (SEE COMMENTS)     Calves Swelling     Metformin And Related UNKNOWN    Olmesartan UNKNOWN   [5]   Current Facility-Administered  Medications:     Certolizumab Pegol  mg, 200 mg, Subcutaneous, Q28 Days

## 2025-06-08 NOTE — ED PROVIDER NOTES
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  No fevers.  Patient also states she is been having melena over the past week and when they checked her oxygen today it was in the 70s.  These are typical symptoms of her 1 or blood counts are low and she needs a blood transfusion.  No vomiting blood.    History reviewed. Past Medical History[1]    History reviewed. Past Surgical History[2]      Medications :  Prescriptions Prior to Admission[3]     Family History[4]    Smoking Status: Social Hx on file[5]    Constitutional and vital signs reviewed.      Social History and Family History elements reviewed from today, pertinent positives to the presenting problem noted.    Physical Exam     ED Triage Vitals [06/08/25 1248]   BP (!) 169/67   Pulse 88   Resp 20   Temp 98.2 °F (36.8 °C)   Temp src Oral   SpO2 90 %   O2 Device None (Room air)       All measures to prevent infection transmission during my interaction with the patient were taken. Handwashing was performed prior to and after the exam.  Stethoscope and any equipment used during my examination was cleaned with super sani-cloth germicidal wipes following the exam.     Physical Exam  Vitals and nursing note reviewed.   Cardiovascular:      Rate and Rhythm: Normal rate.   Pulmonary:      Breath sounds: Rales present.   Abdominal:      Palpations: Abdomen is soft.   Skin:     General: Skin is warm and dry.      Coloration: Skin is pale.   Neurological:      General: No focal deficit present.      Mental Status: She is alert and oriented to person, place, and time.         ED Course        Labs Reviewed   CBC WITH DIFFERENTIAL WITH PLATELET - Abnormal;  Notable for the following components:       Result Value    RBC 2.39 (*)     HGB 6.5 (*)     HCT 22.2 (*)     MCHC 29.3 (*)     RDW-SD 65.1 (*)     RDW 19.5 (*)     All other components within normal limits   COMP METABOLIC PANEL (14) - Abnormal; Notable for the following components:    Glucose 222 (*)     Potassium 2.9 (*)     Calcium, Total 8.3 (*)     Calculated Osmolality 301 (*)     eGFR-Cr 57 (*)     All other components within normal limits   PRO BETA NATRIURETIC PEPTIDE - Abnormal; Notable for the following components:    Pro-Beta Natriuretic Peptide 1,576 (*)     All other components within normal limits   RBC MORPHOLOGY SCAN - Abnormal; Notable for the following components:    RBC Morphology See morphology below (*)     Macrocytosis 2+ (*)     All other components within normal limits   PROTHROMBIN TIME (PT) - Normal   PTT, ACTIVATED - Normal   TROPONIN I HIGH SENSITIVITY - Normal   SARS-COV-2/FLU A AND B/RSV BY PCR (GENEXPERT) - Normal    Narrative:     This test is intended for the qualitative detection and differentiation of SARS-CoV-2, influenza A, influenza B, and respiratory syncytial virus (RSV) viral RNA in nasopharyngeal or nares swabs from individuals suspected of respiratory viral infection consistent with COVID-19 by their healthcare provider. Signs and symptoms of respiratory viral infection due to SARS-CoV-2, influenza, and RSV can be similar.                                    Test performed using the Xpert Xpress SARS-CoV-2/FLU/RSV (real time RT-PCR)  assay on the GeneXpert instrument, [employer], [place] [address].                   This test is being used under the Food and Drug Administration's Emergency Use Authorization.                                    The authorized Fact Sheet for Healthcare Providers for this assay is available upon request from the laboratory.   TYPE AND SCREEN    Narrative:     The following orders were created for panel order Type and screen.                   Procedure                               Abnormality         Status                                     ---------                               -----------         ------                                     ABORH (Blood Type)[[id number]]                               Final result                               Antibody Screen[[id number]]                                  Final result                                                 Please view results for these tests on the individual orders.   ABORH (BLOOD TYPE)   ANTIBODY SCREEN   PREPARE RBC   RAINBOW DRAW LAVENDER   RAINBOW DRAW LIGHT GREEN   RAINBOW DRAW BLUE     EKG    Rate, intervals and axes as noted on EKG Report.  Rate: 84  Rhythm: Sinus Rhythm  Reading: Normal sinus rhythm, heart rate 84, normal intervals, normal axis           As Interpreted by me    Imaging Results Available and Reviewed while in ED: XR CHEST AP PORTABLE  (CPT=71045)  Result Date: 6/8/2025  CONCLUSION:  1. Bilateral patchy opacities without a significant change.  Differential includes infection, edema and fibrosis.    Dictated by (CST): Michael Gonzalez MD on 6/08/2025 at 2:16 PM     Finalized by (CST): Michael Gonzalez MD on 6/08/2025 at 2:17 PM          ED Medications Administered:   Medications   pantoprazole (Protonix) 40 mg in sodium chloride 0.9% PF 10 mL IV push (40 mg Intravenous Given 6/8/25 1350)   furosemide (Lasix) 10 mg/mL injection 40 mg (40 mg Intravenous Given 6/8/25 1428)   potassium chloride (Klor-Con M20) tab 40 mEq (40 mEq Oral Given 6/8/25 1442)         MDM     Vitals:    06/08/25 1248 06/08/25 1334 06/08/25 1345 06/08/25 1415   BP: (!) 169/67  (!) 141/108 (!) 163/62   Pulse: 88  90 79   Resp: 20  22 21   Temp: 98.2 °F (36.8 °C)      TempSrc: Oral      SpO2: 90% 100% 100% 100%   Weight: 62.6 kg      Height: 154.9 cm (5' 1\")        *I personally reviewed and interpreted all ED vitals.    Pulse Ox: 100%, Room air, Normal     Monitor Interpretation:   normal sinus rhythm as  interpreted by me.  The cardiac monitor was ordered to monitor cardiac rate and rhythm.    Differential Diagnosis/ Diagnostic Considerations: New onset CHF, COPD, pneumonia, GI bleed, anemia    Complicating Factors: The patient already has does not have any pertinent problems on file. to contribute to the complexity of this ED evaluation.    Medical Decision Making  Amount and/or Complexity of Data Reviewed  Independent Historian:      Details: Daughter at the bedside helping contribute to medical history  External Data Reviewed: notes.     Details: I reviewed notes from patient's primary care provider Dr. Sorto to help contribute to patient's medical history  Labs: ordered. Decision-making details documented in ED Course.  Radiology: ordered and independent interpretation performed. Decision-making details documented in ED Course.  ECG/medicine tests: ordered and independent interpretation performed. Decision-making details documented in ED Course.    Risk  Decision regarding hospitalization.    Critical Care  Total time providing critical care: 30 minutes      I reviewed all results with patient and family at the bedside.  Patient hemoglobin is 6 so we will order 2 units of blood.  Patient's troponin is negative however proBNP is elevated.  Patient potassium is slightly low, replaced orally with 40 mill equivalents potassium in the ER.  EKG had no acute changes.  Reviewed the chest x-ray myself there is bilateral basilar edema which is concerning for new onset CHF especially with the elevated proBNP.  No wheezing.  I explained the patient will need to admit for blood transfusions along with some Lasix and for further evaluation with GI, cardiology, pulmonology.  They agree with this plan.    I discussed case with  who accepts admission    I discussed case with [facility] cardiology notified of consult    I discussed case with -GI notified of consult    I discussed case with Dr. Parker-pulmonology  notified of consult  Condition upon leaving the department: Stable    Disposition and Plan     Clinical Impression:  1. Anemia, unspecified type    2. Acute pulmonary edema (HCC)    3. Hypervolemia, unspecified hypervolemia type    4. Gastrointestinal hemorrhage, unspecified gastrointestinal hemorrhage type    5. Melena    6. Hypoxemia        Disposition:  Admit    Follow-up:  No follow-up provider specified.    Medications Prescribed:  Current Discharge Medication List          Hospital Problems       Present on Admission  Date Reviewed: 6/2/2025          ICD-10-CM Noted POA    * (Principal) Anemia, unspecified type D64.9 6/8/2025 Unknown                       [1]   Past Medical History:   Anxiety state    Cancer (HCC)    breast cancer 2018    Cerebellar stroke (HCC)    Chronic obstructive pulmonary disease, unspecified (HCC)    Chronic obstructive pulmonary disease, unspecified (HCC)    COPD (chronic obstructive pulmonary disease) (HCC)    Cystic thyroid nodule    Diabetes (HCC)    Diabetes mellitus (HCC)    Essential hypertension    Exposure to medical diagnostic radiation    High blood pressure    High cholesterol    History of blood transfusion    low hemoglobin    Osteoarthritis    PONV (postoperative nausea and vomiting)    Pulmonary embolism (HCC)    Pulmonary emphysema (HCC)    Rheumatoid arthritis (HCC)    Stroke (HCC)   [2]   Past Surgical History:  Procedure Laterality Date    Cataract extraction w/  intraocular lens implant Bilateral 2017    Dr in [facility]     Colonoscopy      Colonoscopy N/A 07/21/2022    Procedure: COLONOSCOPY;  Surgeon: Mikey Garcia MD;  Location: [facility] ENDOSCOPY    Colonoscopy N/A 06/15/2023    Procedure: COLONOSCOPY;  Surgeon: Mikey Garcia MD;  Location: [facility] ENDOSCOPY    Correct bunion,simple      right foot  1993    Egd  12/21/2023    Dr. Garcia; Duodenal AVM's x4 cauterized    Hysterectomy      1972, no abnormal Paps, due to heavy bleeding with fibroids.  Not sure if it  had bilateral salpingo-oophorectomy.    Ir aneurysm repairm      Computer assisted volumetric craniofomy with clipping of anterior cerebral artery.    Lumpectomy right      Radiation right      Rotator cuff repair          Total knee replacement Right 2010    Total knee replacement Left 2015    Transoral incisionless fundoplication - internal  2012 Fredy fundoplication at [facility] Dr. Fournier.    Fredy fundoplication at [facility] Dr. Fournier.    Trigger finger release      left hand      Yag capsulotomy - ou - both eyes Bilateral 2021    done in [place]   [3] (Not in a hospital admission)   [4]   Family History  Problem Relation Age of Onset    Heart Surgery Mother         Leaky valve at 41 y/o.     Prostate Cancer Brother     Cancer Brother     Diabetes Maternal Grandmother     Diabetes Paternal Aunt     Breast Cancer Self 78    Breast Cancer Niece         before 50    Macular degeneration Neg     Glaucoma Neg    [5]   Social History  Socioeconomic History    Marital status:    Tobacco Use    Smoking status: Former     Current packs/day: 0.00     Types: Cigarettes     Quit date:      Years since quittin.4     Passive exposure: Past    Smokeless tobacco: Never    Tobacco comments:     [id number]   Vaping Use    Vaping status: Never Used   Substance and Sexual Activity    Alcohol use: Never    Drug use: Never

## 2025-06-08 NOTE — PLAN OF CARE
Pt alert and oriented, on 1L of O2. One unit of blood given, repeat hg pending. CGM on right arm, consent to use finger stick glucose checks in the chart. Pt ambulated within the room. Pt and family updated on plan of care. Bed in low and locked position, frequent rounding by staff, call light within reach.   Problem: Patient Centered Care  Goal: Patient preferences are identified and integrated in the patient's plan of care  Description: Interventions:  - What would you like us to know as we care for you?   - Provide timely, complete, and accurate information to patient/family  - Incorporate patient and family knowledge, values, beliefs, and cultural backgrounds into the planning and delivery of care  - Encourage patient/family to participate in care and decision-making at the level they choose  - Honor patient and family perspectives and choices  Outcome: Progressing     Problem: Diabetes/Glucose Control  Goal: Glucose maintained within prescribed range  Description: INTERVENTIONS:  - Monitor Blood Glucose as ordered  - Assess for signs and symptoms of hyperglycemia and hypoglycemia  - Administer ordered medications to maintain glucose within target range  - Assess barriers to adequate nutritional intake and initiate nutrition consult as needed  - Instruct patient on self management of diabetes  Outcome: Progressing     Problem: Patient/Family Goals  Goal: Patient/Family Long Term Goal  Description: Patient's Long Term Goal:    Interventions:  - See additional Care Plan goals for specific interventions  Outcome: Progressing  Goal: Patient/Family Short Term Goal  Description: Patient's Short Term Goal:     Interventions:   - See additional Care Plan goals for specific interventions  Outcome: Progressing     Problem: CARDIOVASCULAR - ADULT  Goal: Maintains optimal cardiac output and hemodynamic stability  Description: INTERVENTIONS:  - Monitor vital signs, rhythm, and trends  - Monitor for bleeding, hypotension and  signs of decreased cardiac output  - Evaluate effectiveness of vasoactive medications to optimize hemodynamic stability  - Monitor arterial and/or venous puncture sites for bleeding and/or hematoma  - Assess quality of pulses, skin color and temperature  - Assess for signs of decreased coronary artery perfusion - ex. Angina  - Evaluate fluid balance, assess for edema, trend weights  Outcome: Progressing  Goal: Absence of cardiac arrhythmias or at baseline  Description: INTERVENTIONS:  - Continuous cardiac monitoring, monitor vital signs, obtain 12 lead EKG if indicated  - Evaluate effectiveness of antiarrhythmic and heart rate control medications as ordered  - Initiate emergency measures for life threatening arrhythmias  - Monitor electrolytes and administer replacement therapy as ordered  Outcome: Progressing

## 2025-06-08 NOTE — ED INITIAL ASSESSMENT (HPI)
Patient presents to ER with concerns of low oxygen at home, per patient and family O2 @ 70% on home pulse ox.   Patient admits she feels short of breath, and states she woke up with a cough this am.   Worse when laying flat.

## 2025-06-08 NOTE — CONSULTS
[facility]  part of [facility]    Consult Note    Date:  6/8/2025  Date of Admission:  6/8/2025    Chief Complaint:   Chester Bautista is a(n) 85 year old female with dyspnea and severe anemia    HPI:   The patient has a suspected diagnosis of sarcoidosis, breast cancer, rheumatoid arthritis, status postlumpectomy and radiation, diabetes, severe anemia with chronically bleeding AVMs, iron deficiency with COPD having quit smoking in 1997 although she had only smoked for a few years.  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  She came to the emergency room with worsening dyspnea and dark stools and was found to have a hemoglobin of 6.  2 units of packed red blood cells are ordered and she is now admitted to the pulmonary medicine unit as she also has had several days of worsening shortness of breath.  Chest x-ray demonstrates bilateral patchy opacities without significant change.  The patient has had multiple strokes.  Thrombus was identified at the aortic arch.  She has an appointment reevaluated at [facility] later this month for consideration of lung scope to treat the AVMs in the small bowel.    History   Past Medical History[1]  Past Surgical History[2]  Family History[3]    Social History:, 2 children, quit tobacco in 1997 after an occasional cigarette, no alcohol, retired from [employer][4]  Allergies/Medications:   Allergies: Allergies[5]  Prescriptions Prior to Admission[6]    Review of Systems:   Review of Systems:  Vision normal. Ear nose and throat normal. Bowel normal. Bladder function normal. No depression. No thyroid disease. No lymphatic system concerns.  No rash. Muscles and joints unremarkable. No weight loss no weight gain.    Physical Exam:   Vital Signs:  Blood pressure (!) 184/71,  pulse 92, temperature 98 °F (36.7 °C), temperature source Oral, resp. rate 16, height 5' 1\" (1.549 m), weight 136 lb 14.4 oz (62.1 kg), SpO2 98%.     Alert black female in no distress  HEENT examination is unremarkable with pupils equal round and reactive to light and accommodation.   Neck without adenopathy, thyromegaly, JVD nor bruit.   Lungs slightly diminished to auscultation and percussion.  Cardiac regular rate and rhythm no murmur.   Abdomen nontender, without hepatosplenomegaly and no mass appreciable.   Extremities without clubbing cyanosis nor edema.   Neurologic grossly intact with symmetric tone and strength and reflex.  Skin without gross abnormality    Results:     Lab Results   Component Value Date    WBC 9.6 06/08/2025    HGB 6.5 06/08/2025    HCT 22.2 06/08/2025    .0 06/08/2025    CREATSERUM 0.97 06/08/2025    BUN 13 06/08/2025     06/08/2025    K 2.9 06/08/2025     06/08/2025    CO2 28.0 06/08/2025     06/08/2025    CA 8.3 06/08/2025    ALB 3.4 06/08/2025    ALKPHO 60 06/08/2025    BILT 0.4 06/08/2025    TP 6.2 06/08/2025    AST 24 06/08/2025    ALT 14 06/08/2025    PTT 30.4 06/08/2025    INR 1.05 06/08/2025     Chest x-ray-Bilateral patchy opacities without a significant change.  Differential includes infection, edema and fibrosis.     Assessment/Plan:   1.  Acute on chronic respiratory failure-the patient is now requiring supplemental oxygen and she was desaturating to the 70s while at home.  She has moderate to severe centrilobular emphysema and prior history of venous thromboembolism status post IVC filter in a patient who cannot tolerate full anticoagulation.  She remains on baby aspirin.  Her dyspnea is also confounded by the severe anemia and tendency to pulmonary edema with heart failure with preserved ejection fraction.    Recommendations:  1.  Nebulizer therapy  2.  Transfusion  3.  Lasix with the transfusion  4.  Serial hemoglobin  5.  To follow-up with [facility]  gastroenterology for consideration of long endoscopy  6.  Will follow clinically.    2.  DVT prophylaxis-SCDs    3.  Severe anemia-as above, packed red blood cells and await GI opinion.  May benefit from repeat EGD prior to following up at [facility] for long enteroscopy.    4.  History of strokes-on baby aspirin    6.  History of venous thromboembolism status post filter    7.  CODE STATUS-full      Chapin Parker MD  Medical Director, Critical Care, [facility]  Medical Director, [facility]  Pager: [phone]               [1]   Past Medical History:   Anxiety state    Cancer (HCC)    breast cancer 2018    Cerebellar stroke (HCC)    Chronic obstructive pulmonary disease, unspecified (HCC)    Chronic obstructive pulmonary disease, unspecified (HCC)    COPD (chronic obstructive pulmonary disease) (HCC)    Cystic thyroid nodule    Diabetes (HCC)    Diabetes mellitus (HCC)    Essential hypertension    Exposure to medical diagnostic radiation    High blood pressure    High cholesterol    History of blood transfusion    low hemoglobin    Osteoarthritis    PONV (postoperative nausea and vomiting)    Pulmonary embolism (HCC)    Pulmonary emphysema (HCC)    Rheumatoid arthritis (HCC)    Stroke (HCC)   [2]   Past Surgical History:  Procedure Laterality Date    Cataract extraction w/  intraocular lens implant Bilateral 2017    Dr in [facility]     Colonoscopy      Colonoscopy N/A 07/21/2022    Procedure: COLONOSCOPY;  Surgeon: Mikey Garcia MD;  Location: [facility] ENDOSCOPY    Colonoscopy N/A 06/15/2023    Procedure: COLONOSCOPY;  Surgeon: Mikey Garcia MD;  Location: [facility] ENDOSCOPY    Correct bunion,simple      right foot  1993    Egd  12/21/2023    Dr. Garcia; Duodenal AVM's x4 cauterized    Hysterectomy      1972, no abnormal Paps, due to heavy bleeding with fibroids.  Not sure if it had bilateral salpingo-oophorectomy.    Ir aneurysm repairm  2010    Computer assisted volumetric craniofomy with clipping of  anterior cerebral artery.    Lumpectomy right      Radiation right      Rotator cuff repair          Total knee replacement Right 2010    Total knee replacement Left 2015    Transoral incisionless fundoplication - internal  2012 Fredy fundoplication at [facility] Dr. Fournier.    Fredy fundoplication at [facility] Dr. Fournier.    Trigger finger release      left hand      Yag capsulotomy - ou - both eyes Bilateral 2021    done in [place]   [3]   Family History  Problem Relation Age of Onset    Heart Surgery Mother         Leaky valve at 39 y/o.     Prostate Cancer Brother     Cancer Brother     Diabetes Maternal Grandmother     Diabetes Paternal Aunt     Breast Cancer Self 78    Breast Cancer Niece         before 50    Macular degeneration Neg     Glaucoma Neg    [4]   Social History  Socioeconomic History    Marital status:    Tobacco Use    Smoking status: Former     Current packs/day: 0.00     Types: Cigarettes     Quit date:      Years since quittin.4     Passive exposure: Past    Smokeless tobacco: Never    Tobacco comments:     [id number]   Vaping Use    Vaping status: Never Used   Substance and Sexual Activity    Alcohol use: Never    Drug use: Never   Social History Narrative    Just moved in from [place].   passed away and that is why moved from [place] to be with daughter who lives in [place].  Currently lives with daughter.     Social Drivers of Health     Food Insecurity: No Food Insecurity (2025)    NCSS - Food Insecurity     Worried About Running Out of Food in the Last Year: No     Ran Out of Food in the Last Year: No   Transportation Needs: No Transportation Needs (2025)    NCSS - Transportation     Lack of Transportation: No   Housing Stability: Not At Risk (2025)    NCSS - Housing/Utilities     Has Housing: Yes     Worried About Losing Housing: No     Unable to Get Utilities: No   [5]   Allergies  Allergen  Reactions    Celebrex [Celecoxib] PALPITATIONS    Penicillins ITCHING and SWELLING    Amlodipine OTHER (SEE COMMENTS)     Calves Swelling     Metformin And Related UNKNOWN    Olmesartan UNKNOWN   [6]   Medications Prior to Admission   Medication Sig    losartan 100 MG Oral Tab Take 1 tablet (100 mg total) by mouth daily.    rOPINIRole 0.25 MG Oral Tab Take 1 tablet (0.25 mg total) by mouth every evening.    carvedilol 25 MG Oral Tab Take 1 tablet (25 mg total) by mouth 2 (two) times daily with meals.    glipiZIDE 2.5 MG Oral Tab Take 2.5 mg by mouth every 12 (twelve) hours.    aspirin (ASPIRIN LOW DOSE) 81 MG Oral Tab EC Take 1 tablet (81 mg total) by mouth in the morning.    pantoprazole 40 MG Oral Tab EC Take 1 tablet (40 mg total) by mouth in the morning and 1 tablet (40 mg total) in the evening. Take before meals.    LANTUS SOLOSTAR 100 UNIT/ML Subcutaneous Solution Pen-injector Inject 10 Units into the skin every morning.    benzonatate 200 MG Oral Cap Take 1 capsule (200 mg total) by mouth 3 (three) times daily as needed for cough.    gabapentin 100 MG Oral Cap Take 1 capsule (100 mg total) by mouth nightly.    insulin lispro 100 UNIT/ML Injection Solution Inject 3 Units into the skin in the morning and 3 Units at noon and 3 Units in the evening. Inject before meals. Sliding scale depending on blood sugar.    ipratropium 0.02 % Inhalation Solution Take 2.5 mL (500 mcg total) by nebulization 2 (two) times daily.    acetaminophen 325 MG Oral Tab Take 2 tablets (650 mg total) by mouth every 6 (six) hours as needed.    rosuvastatin 20 MG Oral Tab Take 1 tablet (20 mg total) by mouth nightly.    magnesium oxide 400 MG Oral Tab Take 1 tablet (400 mg total) by mouth 2 (two) times daily. Per Dr. JENSEN Mata    predniSONE 5 MG Oral Tab Take 1 tablet (5 mg total) by mouth daily.    albuterol 108 (90 Base) MCG/ACT Inhalation Aero Soln TAKE 2 PUFFS BY MOUTH EVERY 4 TO 6 HOURS AS NEEDED    montelukast 10 MG Oral Tab Take 1  tablet (10 mg total) by mouth nightly.    [] hydrALAZINE 25 MG Oral Tab Take 1 tablet (25 mg total) by mouth every 8 (eight) hours.    [] fluticasone-salmeterol 250-50 MCG/ACT Inhalation Aerosol Powder, Breath Activated Inhale 1 puff into the lungs 2 (two) times daily.    [] losartan 100 MG Oral Tab Take 1 tablet (100 mg total) by mouth daily.    [] predniSONE 20 MG Oral Tab Take 1 tablet (20 mg total) by mouth daily with breakfast for 2 days, THEN 0.5 tablets (10 mg total) daily with breakfast for 2 days.    lidocaine-menthol 4-1 % External Patch Place 1 patch onto the skin in the morning.    BD PEN NEEDLE KADEN 2ND GEN 32G X 4 MM Does not apply Misc 1 EACH DAILY. USE DAILY WITH INSULIN    Blood Glucose Monitoring Suppl (BLOOD GLUCOSE SYSTEM GILLES) Does not apply Kit Dx. E11.9. Checks qam.    Blood Glucose Monitoring Suppl w/Device Does not apply Kit Dx. E11.9. Checks qam.    Blood Gluc Meter Disp-Strips Does not apply Device Dx. E11.9. Checks qam.    Accu-Chek FastClix Lancets Does not apply Misc Check sugar once daily as directed (E11.42)    Blood Glucose Monitoring Suppl (ONETOUCH VERIO) w/Device Does not apply Kit 1 each daily. Test 1x daily    OneTouch Delica Lancets 33G Does not apply Misc 4 x daily

## 2025-06-09 ENCOUNTER — APPOINTMENT (OUTPATIENT)
Dept: CV DIAGNOSTICS | Facility: HOSPITAL | Age: 85
End: 2025-06-09
Attending: HOSPITALIST
Payer: MEDICARE

## 2025-06-09 LAB
ALBUMIN SERPL-MCNC: 3.5 G/DL (ref 3.2–4.8)
ALBUMIN/GLOB SERPL: 1.2 {RATIO} (ref 1–2)
ALP LIVER SERPL-CCNC: 67 U/L (ref 55–142)
ALT SERPL-CCNC: 25 U/L (ref 10–49)
ANION GAP SERPL CALC-SCNC: 6 MMOL/L (ref 0–18)
AST SERPL-CCNC: 48 U/L (ref ?–34)
ATRIAL RATE: 84 BPM
BASOPHILS # BLD AUTO: 0.02 X10(3) UL (ref 0–0.2)
BASOPHILS NFR BLD AUTO: 0.2 %
BILIRUB SERPL-MCNC: 0.4 MG/DL (ref 0.2–1.1)
BUN BLD-MCNC: 14 MG/DL (ref 9–23)
BUN/CREAT SERPL: 13.7 (ref 10–20)
CALCIUM BLD-MCNC: 8.6 MG/DL (ref 8.7–10.4)
CHLORIDE SERPL-SCNC: 107 MMOL/L (ref 98–112)
CO2 SERPL-SCNC: 29 MMOL/L (ref 21–32)
CREAT BLD-MCNC: 1.02 MG/DL (ref 0.55–1.02)
DEPRECATED RDW RBC AUTO: 61.1 FL (ref 35.1–46.3)
EGFRCR SERPLBLD CKD-EPI 2021: 54 ML/MIN/1.73M2 (ref 60–?)
EOSINOPHIL # BLD AUTO: 0.02 X10(3) UL (ref 0–0.7)
EOSINOPHIL NFR BLD AUTO: 0.2 %
ERYTHROCYTE [DISTWIDTH] IN BLOOD BY AUTOMATED COUNT: 19 % (ref 11–15)
GLOBULIN PLAS-MCNC: 2.9 G/DL (ref 2–3.5)
GLUCOSE BLD-MCNC: 220 MG/DL (ref 70–99)
GLUCOSE BLDC GLUCOMTR-MCNC: 130 MG/DL (ref 70–99)
GLUCOSE BLDC GLUCOMTR-MCNC: 209 MG/DL (ref 70–99)
GLUCOSE BLDC GLUCOMTR-MCNC: 229 MG/DL (ref 70–99)
GLUCOSE BLDC GLUCOMTR-MCNC: 273 MG/DL (ref 70–99)
HCT VFR BLD AUTO: 26.6 % (ref 35–48)
HGB BLD-MCNC: 7.8 G/DL (ref 12–16)
HGB BLD-MCNC: 8 G/DL (ref 12–16)
IMM GRANULOCYTES # BLD AUTO: 0.05 X10(3) UL (ref 0–1)
IMM GRANULOCYTES NFR BLD: 0.5 %
INR BLD: 1.01 (ref 0.8–1.2)
LYMPHOCYTES # BLD AUTO: 0.69 X10(3) UL (ref 1–4)
LYMPHOCYTES NFR BLD AUTO: 6.6 %
MAGNESIUM SERPL-MCNC: 1.5 MG/DL (ref 1.6–2.6)
MAGNESIUM SERPL-MCNC: 1.5 MG/DL (ref 1.6–2.6)
MCH RBC QN AUTO: 27.5 PG (ref 26–34)
MCHC RBC AUTO-ENTMCNC: 30.1 G/DL (ref 31–37)
MCV RBC AUTO: 91.4 FL (ref 80–100)
MONOCYTES # BLD AUTO: 0.23 X10(3) UL (ref 0.1–1)
MONOCYTES NFR BLD AUTO: 2.2 %
NEUTROPHILS # BLD AUTO: 9.44 X10 (3) UL (ref 1.5–7.7)
NEUTROPHILS # BLD AUTO: 9.44 X10(3) UL (ref 1.5–7.7)
NEUTROPHILS NFR BLD AUTO: 90.3 %
OSMOLALITY SERPL CALC.SUM OF ELEC: 301 MOSM/KG (ref 275–295)
P AXIS: 37 DEGREES
P-R INTERVAL: 136 MS
PHOSPHATE SERPL-MCNC: 2.9 MG/DL (ref 2.4–5.1)
PLATELET # BLD AUTO: 294 10(3)UL (ref 150–450)
POTASSIUM SERPL-SCNC: 4.3 MMOL/L (ref 3.5–5.1)
POTASSIUM SERPL-SCNC: 4.6 MMOL/L (ref 3.5–5.1)
POTASSIUM SERPL-SCNC: 4.6 MMOL/L (ref 3.5–5.1)
PROT SERPL-MCNC: 6.4 G/DL (ref 5.7–8.2)
PROTHROMBIN TIME: 13.9 SECONDS (ref 11.6–14.8)
Q-T INTERVAL: 382 MS
QRS DURATION: 72 MS
QTC CALCULATION (BEZET): 451 MS
R AXIS: 15 DEGREES
RBC # BLD AUTO: 2.91 X10(6)UL (ref 3.8–5.3)
SODIUM SERPL-SCNC: 142 MMOL/L (ref 136–145)
T AXIS: 58 DEGREES
VENTRICULAR RATE: 84 BPM
WBC # BLD AUTO: 10.5 X10(3) UL (ref 4–11)

## 2025-06-09 PROCEDURE — 93306 TTE W/DOPPLER COMPLETE: CPT | Performed by: HOSPITALIST

## 2025-06-09 PROCEDURE — 99223 1ST HOSP IP/OBS HIGH 75: CPT | Performed by: INTERNAL MEDICINE

## 2025-06-09 PROCEDURE — 99232 SBSQ HOSP IP/OBS MODERATE 35: CPT | Performed by: INTERNAL MEDICINE

## 2025-06-09 PROCEDURE — 99233 SBSQ HOSP IP/OBS HIGH 50: CPT | Performed by: HOSPITALIST

## 2025-06-09 NOTE — CM/SW NOTE
06/09/25 1500   CM/SW Referral Data   Referral Source Social Work (self-referral)   Reason for Referral Discharge planning   Informant Daughter;Patient   Medical Hx   Does patient have an established PCP? Yes   Patient Info   Patient's Current Mental Status at Time of Assessment Alert;Oriented   Patient's Home Environment House   Number of Levels in Home 3   Number of Stair in Home 14 to bedroom, 1 to enter   Patient lives with Daughter   Patient Status Prior to Admission   Independent with ADLs and Mobility No   Pt. requires assistance with Housework;Driving;Meals;Bathing;Ambulating;Dressing;Medications;Toileting  (has a walker, w/c and rollator at home, no home O2)   Discharge Needs   Anticipated D/C needs Home health care     Has a IV chest port.  Lives with daughter.  Has home equipment and no home O2.  Plan is home with HH and residential help.  [facility] notified of need to restart residential assistance.    PLAN:  Home with HH services.  [facility] and HH referral started in [place], will need list.    Laly LEBLANCN RN TAMICA WEST  RN Case Manager PMU

## 2025-06-09 NOTE — PLAN OF CARE
Problem: Patient Centered Care  Goal: Patient preferences are identified and integrated in the patient's plan of care  Description: Interventions:  - What would you like us to know as we care for you?   - Provide timely, complete, and accurate information to patient/family  - Incorporate patient and family knowledge, values, beliefs, and cultural backgrounds into the planning and delivery of care  - Encourage patient/family to participate in care and decision-making at the level they choose  - Honor patient and family perspectives and choices  Outcome: Progressing     Problem: Diabetes/Glucose Control  Goal: Glucose maintained within prescribed range  Description: INTERVENTIONS:  - Monitor Blood Glucose as ordered  - Assess for signs and symptoms of hyperglycemia and hypoglycemia  - Administer ordered medications to maintain glucose within target range  - Assess barriers to adequate nutritional intake and initiate nutrition consult as needed  - Instruct patient on self management of diabetes  Outcome: Progressing     Problem: Patient/Family Goals  Goal: Patient/Family Long Term Goal  Description: Patient's Long Term Goal:     Interventions:  - -Monitor vital signs  -Monitor appropriate labs  -Follow MD orders  -Diagnostics per order  -Update/inform patient and family on care  -Discharge plans   - See additional Care Plan goals for specific interventions  Outcome: Progressing  Goal: Patient/Family Short Term Goal  Description: Patient's Short Term Goal:     Interventions:   - -Monitor vital signs  -Monitor appropriate labs  -Follow MD orders  -Diagnostics per order  -Update/inform patient and family on care  -Discharge plans   - See additional Care Plan goals for specific interventions  Outcome: Progressing     Problem: CARDIOVASCULAR - ADULT  Goal: Maintains optimal cardiac output and hemodynamic stability  Description: INTERVENTIONS:  - Monitor vital signs, rhythm, and trends  - Monitor for bleeding, hypotension  and signs of decreased cardiac output  - Evaluate effectiveness of vasoactive medications to optimize hemodynamic stability  - Monitor arterial and/or venous puncture sites for bleeding and/or hematoma  - Assess quality of pulses, skin color and temperature  - Assess for signs of decreased coronary artery perfusion - ex. Angina  - Evaluate fluid balance, assess for edema, trend weights  Outcome: Progressing  Goal: Absence of cardiac arrhythmias or at baseline  Description: INTERVENTIONS:  - Continuous cardiac monitoring, monitor vital signs, obtain 12 lead EKG if indicated  - Evaluate effectiveness of antiarrhythmic and heart rate control medications as ordered  - Initiate emergency measures for life threatening arrhythmias  - Monitor electrolytes and administer replacement therapy as ordered  Outcome: Progressing

## 2025-06-09 NOTE — PROGRESS NOTES
[facility]  part of [facility]    Progress Note      Assessment and Plan:   1.  Acute on chronic respiratory failure-the patient is now requiring supplemental oxygen and she was desaturating to the 70s while at home.  She has moderate to severe centrilobular emphysema and prior history of venous thromboembolism status post IVC filter in a patient who cannot tolerate full anticoagulation.  She remains on baby aspirin.  Her dyspnea is also confounded by the severe anemia and tendency to pulmonary edema with heart failure with preserved ejection fraction.  The patient is breathing better today and the hemoglobin is improved.  Gastroenterology will not be performing further procedure now     Recommendations:  1.  Nebulizer therapy  2.  Transfusion  3.  Conservative management without scope at this moment  4.  Serial hemoglobin  5.  Will follow clinically.  6.  Taper oxygen     2.  DVT prophylaxis-SCDs     3.  Severe anemia-as above, packed red blood cells and await GI opinion.  Following up at [facility] for long enteroscopy.     4.  History of strokes-on baby aspirin     6.  History of venous thromboembolism status post filter     7.  CODE STATUS-full      Subjective:   Chester Bautista is a(n) 85 year old female who is breathing better    Objective:   Blood pressure 155/85, pulse 80, temperature 97.9 °F (36.6 °C), temperature source Oral, resp. rate 18, height 5' 1\" (1.549 m), weight 136 lb 6.4 oz (61.9 kg), SpO2 94%.    Physical Exam alert black female  HEENT examination is unremarkable with pupils equal round and reactive to light and accommodation.   Neck without adenopathy, thyromegaly, JVD nor bruit.   Lungs diminished to auscultation and percussion.  Cariac regular rate and rhythm no murmur.   Abdomen nontender, without hepatosplenomegaly and no mass appreciable.   Extremities without clubbing cyanosis nor edema.   Neurologic grossly intact with symmetric tone and strength and  reflex.  Skin without gross abnormality     Results:     Lab Results   Component Value Date    WBC 10.5 06/09/2025    HGB 7.8 06/09/2025    HCT 26.6 06/09/2025    .0 06/09/2025    CREATSERUM 1.02 06/09/2025    BUN 14 06/09/2025     06/09/2025    K 4.6 06/09/2025    K 4.6 06/09/2025     06/09/2025    CO2 29.0 06/09/2025     06/09/2025    CA 8.6 06/09/2025    ALB 3.5 06/09/2025    ALKPHO 67 06/09/2025    BILT 0.4 06/09/2025    TP 6.4 06/09/2025    AST 48 06/09/2025    ALT 25 06/09/2025    INR 1.01 06/09/2025    MG 1.5 06/09/2025    MG 1.5 06/09/2025    PHOS 2.9 06/09/2025       Chapin Parker MD  Medical Director, Critical Care, [facility]  Medical Director, [facility]  Pager: [phone]

## 2025-06-09 NOTE — PROGRESS NOTES
[facility]  part of [facility]    Progress Note    Chester Bautista Patient Status:  Inpatient    1940 MRN [id number]   Location [facility]5W Attending Carole Richardson,*   Hosp Day # 1 PCP Heike Sorto MD     Chief Complaint: ok    Subjective:     Constitutional:  Positive for activity change and appetite change.   HENT:  Negative for congestion.    Respiratory:  Negative for chest tightness and shortness of breath.    Cardiovascular:  Negative for chest pain.   Gastrointestinal:  Negative for abdominal pain.   Genitourinary:  Negative for dysuria.   Musculoskeletal:  Negative for joint swelling and joint pain.   Neurological:  Negative for tremors, weakness and light-headedness.   Psychiatric/Behavioral:  Negative for sleep disturbance and decreased concentration. The patient is not nervous/anxious.        Objective:   Blood pressure 143/70, pulse 78, temperature 98.2 °F (36.8 °C), temperature source Oral, resp. rate 19, height 5' 1\" (1.549 m), weight 136 lb 6.4 oz (61.9 kg), SpO2 95%.  Physical Exam  Vitals and nursing note reviewed.   Constitutional:       General: She is not in acute distress.     Appearance: She is not ill-appearing.   HENT:      Head: Normocephalic and atraumatic.   Cardiovascular:      Rate and Rhythm: Normal rate.      Pulses: Normal pulses.   Pulmonary:      Breath sounds: Rhonchi present. No wheezing.   Abdominal:      Tenderness: There is no abdominal tenderness.   Skin:     General: Skin is warm and dry.      Capillary Refill: Capillary refill takes less than 2 seconds.   Neurological:      General: No focal deficit present.      Mental Status: She is alert and oriented to person, place, and time.   Psychiatric:         Behavior: Behavior normal.         Results:   Lab Results   Component Value Date    WBC 10.5 2025    HGB 8.0 (L) 2025    HCT 26.6 (L) 2025    .0 2025    CREATSERUM 1.02 2025     BUN 14 06/09/2025     06/09/2025    K 4.6 06/09/2025    K 4.6 06/09/2025     06/09/2025    CO2 29.0 06/09/2025     (H) 06/09/2025    CA 8.6 (L) 06/09/2025    ALB 3.5 06/09/2025    ALKPHO 67 06/09/2025    BILT 0.4 06/09/2025    TP 6.4 06/09/2025    AST 48 (H) 06/09/2025    ALT 25 06/09/2025    PTT 30.4 06/08/2025    INR 1.01 06/09/2025    T4F 1.2 01/17/2025    TSH 0.418 (L) 01/17/2025    DDIMER 2.77 (H) 01/25/2025    ESRML 56 (H) 03/13/2025    CRP 1.60 (H) 03/13/2025    MG 1.5 (L) 06/09/2025    MG 1.5 (L) 06/09/2025    PHOS 2.9 06/09/2025    TROPHS 16 06/08/2025    B12 1,058 (H) 02/04/2025       XR CHEST AP PORTABLE  (CPT=71045)  Result Date: 6/8/2025  CONCLUSION:  1. Bilateral patchy opacities without a significant change.  Differential includes infection, edema and fibrosis.    Dictated by (CST): Michael Gonzalez MD on 6/08/2025 at 2:16 PM     Finalized by (CST): Michael Gonzalez MD on 6/08/2025 at 2:17 PM          EKG  Result Date: 6/9/2025  Normal sinus rhythm Possible Anterior infarct (cited on or before 22-OCT-2024) Abnormal ECG When compared with ECG of 29-APR-2025 10:07, No significant change was found Confirmed by TERESSA CASTANEDA, GARETH ([id number]) on 6/9/2025 10:26:23 AM      Assessment & Plan:     1.       Probably AVM causing GI bleed and GI blood-loss anemia, severe, requiring transfusion and causing dyspnea. Await endoscopy  2.       Acute hypoxemic respiratory failure perhaps from COPD and anemia.  Continue oxygen.  3.       CHF.  Currently the type is unknown but it appears to me that it may be an acute on chronic diastolic congestive heart failure.  Await echo and on diuretics.  4.       Hypertension.  Given bleeding, will continue medications.  5.       Rheumatoid arthritis.  We will start stress doses of steroids.  6.       Diabetes.  Will cover with sliding scale and D5 if she is made n.p.o.  7.       Code Status:  Full Code.  Discussed with patient during this hospitalization with her daughter  present.      Complex mdm; coordinating care with nurse and counseling pt about endo    JAMILAS LIANNA CARVAJAL MD

## 2025-06-09 NOTE — PROGRESS NOTES
Progress Note  Chester Bautista Patient Status:  Inpatient    1940 MRN [id number]   Location [facility]5W Attending Carole Richardson,*   Hosp Day # 1 PCP Heike Sorto MD     SUBJECTIVE:    Denies orthopnea, dyspnea, or weakness. No bloody BMs overnight.     VITALS:  /67 (BP Location: Left arm)   Pulse 84   Temp 98.7 °F (37.1 °C) (Oral)   Resp 20   Ht 5' 1\" (1.549 m)   Wt 136 lb 6.4 oz (61.9 kg)   SpO2 95%   BMI 25.77 kg/m²   INTAKE/OUTPUT:    Intake/Output Summary (Last 24 hours) at 2025 0944  Last data filed at 2025 0503  Gross per 24 hour   Intake 714.67 ml   Output 1500 ml   Net -785.33 ml     Last 3 Weights   25 0503 136 lb 6.4 oz (61.9 kg)   25 1546 136 lb 14.4 oz (62.1 kg)   25 1248 138 lb (62.6 kg)   25 1231 137 lb (62.1 kg)   25 1328 137 lb 6.4 oz (62.3 kg)     LABS:  Recent Labs   Lab 25  0103 25  0505   *  --   --  220*   BUN 13  --   --  14   CREATSERUM 0.97  --   --  1.02   EGFRCR 57*  --   --  54*   CA 8.3*  --   --  8.6*     --   --  142   K 2.9* 3.8 4.3 4.6  4.6     --   --  107   CO2 28.0  --   --  29.0     Recent Labs   Lab 25  0505   RBC 2.39*  --  2.91*   HGB 6.5* 8.2* 8.0*   HCT 22.2*  --  26.6*   MCV 92.9  --  91.4   MCH 27.2  --  27.5   MCHC 29.3*  --  30.1*   RDW 19.5*  --  19.0*   NEPRELIM 6.77  --  9.44*   WBC 9.6  --  10.5   .0  --  294.0     No results for input(s): \"TROP\", \"CK\" in the last 168 hours.  DIAGNOSTICS:  TELEMETRY: SR    ROS: Negative unless noted above   PHYSICAL EXAM:  General: Alert and oriented x 3. No apparent distress.  HEENT: Normocephalic, sclera are nonicteric. Hearing appropriate bilaterally.  Neck: No JVD or Carotid bruits. Trachea midline.   Cardiac: Regular rate and rhythm. S1, S2 auscultated. No murmurs, rubs, or gallops appreciated.   Lungs: Clear without wheezes, rales,  rhonchi or dullness. Chest expansion symmetrical. Regular effort.  Abdomen: Soft, non-tender, +BS. No hepatosplenomegaly or appreciable masses.   Extremities: Without clubbing, cyanosis. Peripheral pulses are 2+. Edema none   Neurologic: Motor and sensory nerves grossly intact.   Psych: Appropriate affect   Skin: Warm and dry. No obvious lesions, wounds, or ulcerations.     MEDICATIONS:  Scheduled Medications[1]  Medication Infusions[2]    ASSESSMENT:    Presented with dyspnea and decreased oxygenation, on admission was found to have a hgb of 6.5 and received 1 units PRBCs. Cardiology was consulted to volume management post transfusion and dyspnea.     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 2025  COPD/ Pulmonary Fibrosis     PLAN:  - Appears euvolemic from a cardiology standpoint. ECHO pending reassess LVEF And valvular functioning   - Will continue PRN IV Lasix post-transfusions     Plan of care discussed with patient and RN.     Kristel Osorio, MSN, FNP-BC, CCK  06/09/25   9:44 AM      Cardiology attending    Notable reviewed and patient examined independently.  Discussed with patient's daughter.  Cardiac rhythm and blood pressure and volume status appears stable.    Cardiac status is optimized for the planned GI procedures.       [1]    magnesium sulfate  4 g Intravenous Once    carvedilol  25  mg Oral BID with meals    fluticasone-salmeterol  1 puff Inhalation BID    gabapentin  100 mg Oral Nightly    hydrALAZINE  25 mg Oral Q8H MARGE    lidocaine-menthol  1 patch Transdermal Daily    magnesium oxide  400 mg Oral BID    montelukast  10 mg Oral Nightly    rOPINIRole  0.25 mg Oral QPM    rosuvastatin  20 mg Oral Nightly    hydrocortisone sodium succinate  25 mg Intravenous Q8H MARGE    pantoprazole  40 mg Intravenous Q12H    insulin aspart  3 Units Subcutaneous TID CC    insulin aspart  1-11 Units Subcutaneous TID CC    insulin degludec  10 Units Subcutaneous Daily   [2]    dextrose 42 mL/hr at 06/09/25 0054

## 2025-06-09 NOTE — CONSULTS
Is this a shared or split note between Advanced Practice Provider and Physician? Yes      [facility]   Gastroenterology Consultation Note    Chester Bautista  Patient Status:    Inpatient  Date of Admission:         6/8/2025, Hospital day #1  Attending:   Carole Richardson,*  PCP:     Heike Sorto MD    Reason for Consultation:  Acute on Chronic Anemia, Melena    History of Present Illness:  Chester Bautista is a 85 year old female w/ PMHx of chronic kidney disease, diabetes, sarcoidosis, dyslipidemia, hypertension, infiltrating ductal carcinoma of the breast, rheumatoid arthritis, osteoarthrosis, peripheral vascular disease, multiple listed eye conditions, COPD, CVA (on ASA) who follows with GI for AVM disease c/b recurrent GIB and persistent anemia.  She has been started on octreotide therapy, received infusions and IV iron regularly.  She presented to the ED with several episodes of black tarry stool at home.      Pt states that she had several episodes of loose/tarry stools for 1-2 days at home, unsure of how many episodes but then had worsening SOB.  Denies ABD pain, N/V, dyspepsia, difficult/painful swallowing, fever, chills, chest pain, SOB or unintentional weight loss.  She has not had any overt bleeding/bowel movements since being in the hospital.    Pertinent Family Hx:  - No known history of esophageal, gastric or colon cancers  - No known IBD  - No known liver pathologies    Endoscopy Hx:  - Impression:   1. Multiple gastric and duodenal AVMs. She recently underwent cautery of gastric and proximal duodenal avms in two months ago and now multiple new ones have grown. Suspect she will have chronic AVM disease given its natural course. Utility of prophylactic endoscopic treatment is not clear as she will continue to grow new ones. Suggest medical management,.    Impression:  1. Incomplete small bowel study because of delayed gastric emptying, pillcam retained in  stomach for duration of study     Social Hx:  - No tobacco use  - No ETOH  - Denies cannabis/illicit drug use  - Denies NSAIDs  - Lives with daughter  - Occupation: Retired     History:  Past Medical History[1]  Past Surgical History[2]  Family History[3]   reports that she quit smoking about 28 years ago. Her smoking use included cigarettes. She has been exposed to tobacco smoke. She has never used smokeless tobacco. She reports that she does not drink alcohol and does not use drugs.    Allergies:  Allergies[4]    Medications:  Current Hospital Medications[5]    Review of Systems:   CONSTITUTIONAL:  negative for fevers, chills, unintentional weight loss   EYES:  negative for diplopia or change in vision   RESPIRATORY:  negative for severe shortness of breath  CARDIOVASCULAR:  negative for crushing sub-sternal chest pain  GASTROINTESTINAL:  see HPI  GENITOURINARY:  negative for dysuria or gross hematuria  INTEGUMENT/BREAST:  SKIN:  negative for jaundice or new rash   ALLERGIC/IMMUNOLOGIC:  negative for hay fever   ENDOCRINE:  negative for cold intolerance and heat intolerance  MUSCULOSKELETAL:  negative for joint effusion/severe erythema  NEURO: negative for new loss of consciousness or dizziness   BEHAVIOR/PSYCH:  negative for psychotic behavior    Physical Exam:    Blood pressure 159/67, pulse 84, temperature 98.7 °F (37.1 °C), temperature source Oral, resp. rate 20, height 5' 1\" (1.549 m), weight 136 lb 6.4 oz (61.9 kg), SpO2 95%. Body mass index is 25.77 kg/m².    Gen: awake, alert patient, NAD  HEENT: EOMI, the sclera appears anicteric, oropharynx clear, mucus membranes appear moist  CV: RRR  Lung: no conversational dyspnea   Abdomen: soft NTND abdomen with NABS appreciated   Back: No CVA tenderness   Skin: dry, warm, no jaundice  Ext: no LE edema is evident  Neuro: Alert, oriented x3 and interactive  Psych: calm, cooperative    Laboratory Data:  Lab Results   Component Value Date    WBC 10.5 06/09/2025    HGB  8.0 06/09/2025    HCT 26.6 06/09/2025    .0 06/09/2025    CREATSERUM 1.02 06/09/2025    BUN 14 06/09/2025     06/09/2025    K 4.6 06/09/2025    K 4.6 06/09/2025     06/09/2025    CO2 29.0 06/09/2025     06/09/2025    CA 8.6 06/09/2025    ALB 3.5 06/09/2025    ALKPHO 67 06/09/2025    BILT 0.4 06/09/2025    TP 6.4 06/09/2025    AST 48 06/09/2025    ALT 25 06/09/2025    PTT 30.4 06/08/2025    INR 1.01 06/09/2025    MG 1.5 06/09/2025    MG 1.5 06/09/2025    PHOS 2.9 06/09/2025       Imaging:  XR CHEST AP PORTABLE  (CPT=71045)  Result Date: 6/8/2025  CONCLUSION:  1. Bilateral patchy opacities without a significant change.  Differential includes infection, edema and fibrosis.    Dictated by (CST): Michael Gonzalez MD on 6/08/2025 at 2:16 PM     Finalized by (CST): Michael Gonzalez MD on 6/08/2025 at 2:17 PM            Assessment & Plan   Chester Bautista is a 85 year old female w/ PMHx of chronic kidney disease, diabetes, sarcoidosis, dyslipidemia, hypertension, infiltrating ductal carcinoma of the breast, rheumatoid arthritis, osteoarthrosis, peripheral vascular disease, multiple listed eye conditions, COPD, CVA (on ASA) who follows with GI for AVM disease c/b recurrent GIB and persistent anemia.  She has been started on octreotide therapy, received infusions and IV iron regularly.  She presented to the ED with several episodes of black tarry stool at home.      #Melena  #Acute on Chronic Anemia  -Hgb on admission 6.5, transfused 1 unit with adequate response to 8.2. Stable this am at 8  -Pt with known AVM disease with recommendation for medical management given she will continue to grow new ones despite intervention  -She has transfusion ~q10 days as well as iron infusions.     -No overt bleeding since admission, stable hgb.  Plan for IV iron replacement, transfuse as needed and follow up with [facility] for double balloon enteroscopy.    #New heart failure  -Pt SOB and hypoxic on admission with  elevated BNP and signs of FVO.    -Cardiology w/u in process for possible new onset heart failure    Recommend:  -Low fiber/soft diet  -IV iron  -Monitor for overt bleeding  -Trend Hgb, transfuse to goal >7  -Cardiology w/u as above  -Follow up with [facility] for double balloon enteroscopy, scheduled for 6/25/2025 with Dr. Gustafson    Thank you for the opportunity to participate in the care of this patient.    Case discussed with Zoila Mcqueen MD and Olayinka SOW.    Vera Thompson [facility], [facility] Gastroenterology  6/9/2025          [1]   Past Medical History:   Anxiety state    Cancer (HCC)    breast cancer 2018    Cerebellar stroke (HCC)    Chronic obstructive pulmonary disease, unspecified (HCC)    Chronic obstructive pulmonary disease, unspecified (HCC)    COPD (chronic obstructive pulmonary disease) (HCC)    Cystic thyroid nodule    Diabetes (HCC)    Diabetes mellitus (HCC)    Essential hypertension    Exposure to medical diagnostic radiation    High blood pressure    High cholesterol    History of blood transfusion    low hemoglobin    Osteoarthritis    PONV (postoperative nausea and vomiting)    Pulmonary embolism (HCC)    Pulmonary emphysema (HCC)    Rheumatoid arthritis (HCC)    Stroke (HCC)   [2]   Past Surgical History:  Procedure Laterality Date    Cataract extraction w/  intraocular lens implant Bilateral 2017    Dr in [facility]     Colonoscopy      Colonoscopy N/A 07/21/2022    Procedure: COLONOSCOPY;  Surgeon: Mikey Garcia MD;  Location: [facility] ENDOSCOPY    Colonoscopy N/A 06/15/2023    Procedure: COLONOSCOPY;  Surgeon: Mikey Garcia MD;  Location: [facility] ENDOSCOPY    Correct bunion,simple      right foot  1993    Egd  12/21/2023    Dr. Garcia; Duodenal AVM's x4 cauterized    Hysterectomy      1972, no abnormal Paps, due to heavy bleeding with fibroids.  Not sure if it had bilateral salpingo-oophorectomy.    Ir aneurysm repairm  2010    Computer assisted volumetric craniofomy with clipping of anterior  cerebral artery.    Lumpectomy right      Radiation right      Rotator cuff repair      1993    Total knee replacement Right 2010    Total knee replacement Left 07/02/2015    Transoral incisionless fundoplication - internal  01/25/2012 Fredy fundoplication at [facility] Dr. Fournier.    Fredy fundoplication at [facility] Dr. Fournier.    Trigger finger release      left hand  2005    Yag capsulotomy - ou - both eyes Bilateral 09/2021    done in [place]   [3]   Family History  Problem Relation Age of Onset    Heart Surgery Mother         Leaky valve at 39 y/o.     Prostate Cancer Brother     Cancer Brother     Diabetes Maternal Grandmother     Diabetes Paternal Aunt     Breast Cancer Self 78    Breast Cancer Niece         before 50    Macular degeneration Neg     Glaucoma Neg    [4]   Allergies  Allergen Reactions    Celebrex [Celecoxib] PALPITATIONS    Penicillins ITCHING and SWELLING    Amlodipine OTHER (SEE COMMENTS)     Calves Swelling     Metformin And Related UNKNOWN    Olmesartan UNKNOWN   [5]   Current Facility-Administered Medications:     magnesium sulfate 4 g/100mL IVPB premix 4 g, 4 g, Intravenous, Once    albuterol (Ventolin HFA) 108 (90 Base) MCG/ACT inhaler 2 puff, 2 puff, Inhalation, Q4H PRN    benzonatate (Tessalon) cap 200 mg, 200 mg, Oral, TID PRN    carvedilol (Coreg) tab 25 mg, 25 mg, Oral, BID with meals    fluticasone-salmeterol (Advair Diskus) 250-50 MCG/ACT inhaler 1 puff, 1 puff, Inhalation, BID    gabapentin (Neurontin) cap 100 mg, 100 mg, Oral, Nightly    hydrALAZINE (Apresoline) tab 25 mg, 25 mg, Oral, Q8H MARGE    ipratropium (Atrovent) 0.02 % nebulizer solution 500 mcg, 500 mcg, Nebulization, Q6H PRN    lidocaine-menthol 4-1 % patch 1 patch, 1 patch, Transdermal, Daily    magnesium oxide (Mag-Ox) tab 400 mg, 400 mg, Oral, BID    montelukast (Singulair) tab 10 mg, 10 mg, Oral, Nightly    rOPINIRole (Requip) tab 0.25 mg, 0.25 mg, Oral, QPM    rosuvastatin (Crestor)  tab 20 mg, 20 mg, Oral, Nightly    acetaminophen (Tylenol Extra Strength) tab 500 mg, 500 mg, Oral, Q4H PRN    acetaminophen (Tylenol) tab 650 mg, 650 mg, Oral, Q4H PRN **OR** HYDROcodone-acetaminophen (Norco) 5-325 MG per tab 1 tablet, 1 tablet, Oral, Q4H PRN **OR** HYDROcodone-acetaminophen (Norco) 5-325 MG per tab 2 tablet, 2 tablet, Oral, Q4H PRN    melatonin tab 3 mg, 3 mg, Oral, Nightly PRN    polyethylene glycol (PEG 3350) (Miralax) 17 g oral packet 17 g, 17 g, Oral, Daily PRN    sennosides (Senokot) tab 17.2 mg, 17.2 mg, Oral, Nightly PRN    bisacodyl (Dulcolax) 10 MG rectal suppository 10 mg, 10 mg, Rectal, Daily PRN    fleet enema (Fleet) rectal enema 133 mL, 1 enema, Rectal, Once PRN    ondansetron (Zofran) 4 MG/2ML injection 4 mg, 4 mg, Intravenous, Q6H PRN    metoclopramide (Reglan) 5 mg/mL injection 5 mg, 5 mg, Intravenous, Q8H PRN    sodium chloride (Saline Mist) 0.65 % nasal solution 1 spray, 1 spray, Each Nare, Q3H PRN    glycerin-hypromellose- (Artificial Tears) 0.2-0.2-1 % ophthalmic solution 1 drop, 1 drop, Both Eyes, QID PRN    guaiFENesin (Robitussin) 100 MG/5 ML oral liquid 200 mg, 200 mg, Oral, Q4H PRN    hydrocortisone Na succinate PF (Solu-CORTEF) injection 25 mg, 25 mg, Intravenous, Q8H MARGE    pantoprazole (Protonix) 40 mg in sodium chloride 0.9% PF 10 mL IV push, 40 mg, Intravenous, Q12H    hydrALAzine (Apresoline) 20 mg/mL injection 10 mg, 10 mg, Intravenous, Q4H PRN    dextrose 5% infusion, , Intravenous, Continuous    glucose (Dex4) 15 GM/59ML oral liquid 15 g, 15 g, Oral, Q15 Min PRN **OR** glucose (Glutose) 40% oral gel 15 g, 15 g, Oral, Q15 Min PRN **OR** glucose-vitamin C (Dex-4) chewable tab 4 tablet, 4 tablet, Oral, Q15 Min PRN **OR** dextrose 50% injection 50 mL, 50 mL, Intravenous, Q15 Min PRN **OR** glucose (Dex4) 15 GM/59ML oral liquid 30 g, 30 g, Oral, Q15 Min PRN **OR** glucose (Glutose) 40% oral gel 30 g, 30 g, Oral, Q15 Min PRN **OR** glucose-vitamin C (Dex-4)  chewable tab 8 tablet, 8 tablet, Oral, Q15 Min PRN    insulin aspart (NovoLOG) 100 Units/mL FlexPen 3 Units, 3 Units, Subcutaneous, TID CC    insulin aspart (NovoLOG) 100 Units/mL FlexPen 1-11 Units, 1-11 Units, Subcutaneous, TID CC    insulin degludec (Tresiba) 100 units/mL flextouch 10 Units, 10 Units, Subcutaneous, Daily

## 2025-06-09 NOTE — PLAN OF CARE
Problem: Patient Centered Care  Goal: Patient preferences are identified and integrated in the patient's plan of care  Description: Interventions:  - What would you like us to know as we care for you? I live at home with my family/   - Provide timely, complete, and accurate information to patient/family  - Incorporate patient and family knowledge, values, beliefs, and cultural backgrounds into the planning and delivery of care  - Encourage patient/family to participate in care and decision-making at the level they choose  - Honor patient and family perspectives and choices  Outcome: Progressing     Problem: Diabetes/Glucose Control  Goal: Glucose maintained within prescribed range  Description: INTERVENTIONS:  - Monitor Blood Glucose as ordered  - Assess for signs and symptoms of hyperglycemia and hypoglycemia  - Administer ordered medications to maintain glucose within target range  - Assess barriers to adequate nutritional intake and initiate nutrition consult as needed  - Instruct patient on self management of diabetes  Outcome: Progressing     Problem: Patient/Family Goals  Goal: Patient/Family Long Term Goal  Description: Patient's Long Term Goal: Discharge.     Interventions:  - Follow MD orders, monitor vitals   - See additional Care Plan goals for specific interventions  Outcome: Progressing  Goal: Patient/Family Short Term Goal  Description: Patient's Short Term Goal: to feel better    Interventions:   - follow MD orders, monitor vital signs   - See additional Care Plan goals for specific interventions  Outcome: Progressing     Problem: CARDIOVASCULAR - ADULT  Goal: Maintains optimal cardiac output and hemodynamic stability  Description: INTERVENTIONS:  - Monitor vital signs, rhythm, and trends  - Monitor for bleeding, hypotension and signs of decreased cardiac output  - Evaluate effectiveness of vasoactive medications to optimize hemodynamic stability  - Monitor arterial and/or venous puncture sites for  bleeding and/or hematoma  - Assess quality of pulses, skin color and temperature  - Assess for signs of decreased coronary artery perfusion - ex. Angina  - Evaluate fluid balance, assess for edema, trend weights  Outcome: Progressing  Goal: Absence of cardiac arrhythmias or at baseline  Description: INTERVENTIONS:  - Continuous cardiac monitoring, monitor vital signs, obtain 12 lead EKG if indicated  - Evaluate effectiveness of antiarrhythmic and heart rate control medications as ordered  - Initiate emergency measures for life threatening arrhythmias  - Monitor electrolytes and administer replacement therapy as ordered  Outcome: Progressing

## 2025-06-10 ENCOUNTER — TELEPHONE (OUTPATIENT)
Age: 85
End: 2025-06-10

## 2025-06-10 LAB
ANION GAP SERPL CALC-SCNC: 6 MMOL/L (ref 0–18)
BASOPHILS # BLD AUTO: 0.02 X10(3) UL (ref 0–0.2)
BASOPHILS NFR BLD AUTO: 0.2 %
BUN BLD-MCNC: 16 MG/DL (ref 9–23)
BUN/CREAT SERPL: 15.1 (ref 10–20)
CALCIUM BLD-MCNC: 8.9 MG/DL (ref 8.7–10.4)
CHLORIDE SERPL-SCNC: 104 MMOL/L (ref 98–112)
CO2 SERPL-SCNC: 30 MMOL/L (ref 21–32)
CREAT BLD-MCNC: 1.06 MG/DL (ref 0.55–1.02)
DEPRECATED RDW RBC AUTO: 60 FL (ref 35.1–46.3)
EGFRCR SERPLBLD CKD-EPI 2021: 51 ML/MIN/1.73M2 (ref 60–?)
EOSINOPHIL # BLD AUTO: 0.01 X10(3) UL (ref 0–0.7)
EOSINOPHIL NFR BLD AUTO: 0.1 %
ERYTHROCYTE [DISTWIDTH] IN BLOOD BY AUTOMATED COUNT: 18.9 % (ref 11–15)
GLUCOSE BLD-MCNC: 180 MG/DL (ref 70–99)
GLUCOSE BLDC GLUCOMTR-MCNC: 150 MG/DL (ref 70–99)
GLUCOSE BLDC GLUCOMTR-MCNC: 215 MG/DL (ref 70–99)
GLUCOSE BLDC GLUCOMTR-MCNC: 229 MG/DL (ref 70–99)
GLUCOSE BLDC GLUCOMTR-MCNC: 239 MG/DL (ref 70–99)
HCT VFR BLD AUTO: 25.6 % (ref 35–48)
HGB BLD-MCNC: 7.9 G/DL (ref 12–16)
IMM GRANULOCYTES # BLD AUTO: 0.07 X10(3) UL (ref 0–1)
IMM GRANULOCYTES NFR BLD: 0.6 %
LYMPHOCYTES # BLD AUTO: 0.83 X10(3) UL (ref 1–4)
LYMPHOCYTES NFR BLD AUTO: 7.2 %
MAGNESIUM SERPL-MCNC: 2.6 MG/DL (ref 1.6–2.6)
MCH RBC QN AUTO: 27.5 PG (ref 26–34)
MCHC RBC AUTO-ENTMCNC: 30.9 G/DL (ref 31–37)
MCV RBC AUTO: 89.2 FL (ref 80–100)
MONOCYTES # BLD AUTO: 0.38 X10(3) UL (ref 0.1–1)
MONOCYTES NFR BLD AUTO: 3.3 %
NEUTROPHILS # BLD AUTO: 10.14 X10 (3) UL (ref 1.5–7.7)
NEUTROPHILS # BLD AUTO: 10.14 X10(3) UL (ref 1.5–7.7)
NEUTROPHILS NFR BLD AUTO: 88.6 %
OSMOLALITY SERPL CALC.SUM OF ELEC: 296 MOSM/KG (ref 275–295)
PHOSPHATE SERPL-MCNC: 3.7 MG/DL (ref 2.4–5.1)
PLATELET # BLD AUTO: 280 10(3)UL (ref 150–450)
POTASSIUM SERPL-SCNC: 4.1 MMOL/L (ref 3.5–5.1)
RBC # BLD AUTO: 2.87 X10(6)UL (ref 3.8–5.3)
SODIUM SERPL-SCNC: 140 MMOL/L (ref 136–145)
WBC # BLD AUTO: 11.5 X10(3) UL (ref 4–11)

## 2025-06-10 PROCEDURE — 99233 SBSQ HOSP IP/OBS HIGH 50: CPT | Performed by: HOSPITALIST

## 2025-06-10 PROCEDURE — 99233 SBSQ HOSP IP/OBS HIGH 50: CPT | Performed by: INTERNAL MEDICINE

## 2025-06-10 PROCEDURE — 99232 SBSQ HOSP IP/OBS MODERATE 35: CPT | Performed by: INTERNAL MEDICINE

## 2025-06-10 RX ORDER — ROSUVASTATIN CALCIUM 10 MG/1
10 TABLET, COATED ORAL NIGHTLY
Status: DISCONTINUED | OUTPATIENT
Start: 2025-06-10 | End: 2025-06-11

## 2025-06-10 RX ORDER — HYDRALAZINE HYDROCHLORIDE 50 MG/1
50 TABLET, FILM COATED ORAL EVERY 8 HOURS SCHEDULED
Status: DISCONTINUED | OUTPATIENT
Start: 2025-06-10 | End: 2025-06-11

## 2025-06-10 RX ORDER — INSULIN DEGLUDEC 100 U/ML
13 INJECTION, SOLUTION SUBCUTANEOUS DAILY
Status: DISCONTINUED | OUTPATIENT
Start: 2025-06-11 | End: 2025-06-11

## 2025-06-10 NOTE — PROGRESS NOTES
[facility]  part of [facility]    Progress Note    Chester Bautista Patient Status:  Inpatient    1940 MRN [id number]   Location [facility]5W Attending Carole Richardson,*   Hosp Day # 2 PCP Heike Sorto MD     Chief Complaint: ok    Subjective:     Constitutional:  Positive for activity change and appetite change.   HENT:  Negative for congestion.    Respiratory:  Negative for chest tightness and shortness of breath.    Cardiovascular:  Negative for chest pain.   Gastrointestinal:  Negative for abdominal pain.   Genitourinary:  Negative for dysuria.   Musculoskeletal:  Negative for joint swelling and joint pain.   Neurological:  Negative for tremors, weakness and light-headedness.   Psychiatric/Behavioral:  Negative for sleep disturbance and decreased concentration. The patient is not nervous/anxious.        Objective:   Blood pressure 160/66, pulse 85, temperature 98 °F (36.7 °C), temperature source Oral, resp. rate 18, height 5' 1\" (1.549 m), weight 136 lb 8 oz (61.9 kg), SpO2 97%.  Physical Exam  Vitals and nursing note reviewed.   Constitutional:       General: She is not in acute distress.     Appearance: She is not ill-appearing.   HENT:      Head: Normocephalic and atraumatic.   Cardiovascular:      Rate and Rhythm: Normal rate.      Pulses: Normal pulses.   Pulmonary:      Breath sounds: Rhonchi present. No wheezing.   Abdominal:      Tenderness: There is no abdominal tenderness.   Skin:     General: Skin is warm and dry.      Capillary Refill: Capillary refill takes less than 2 seconds.   Neurological:      General: No focal deficit present.      Mental Status: She is alert and oriented to person, place, and time.   Psychiatric:         Behavior: Behavior normal.         Results:   Lab Results   Component Value Date    WBC 11.5 (H) 06/10/2025    HGB 7.9 (L) 06/10/2025    HCT 25.6 (L) 06/10/2025    .0 06/10/2025    CREATSERUM 1.06 (H)  06/10/2025    BUN 16 06/10/2025     06/10/2025    K 4.1 06/10/2025     06/10/2025    CO2 30.0 06/10/2025     (H) 06/10/2025    CA 8.9 06/10/2025    ALB 3.5 06/09/2025    ALKPHO 67 06/09/2025    BILT 0.4 06/09/2025    TP 6.4 06/09/2025    AST 48 (H) 06/09/2025    ALT 25 06/09/2025    PTT 30.4 06/08/2025    INR 1.01 06/09/2025    T4F 1.2 01/17/2025    TSH 0.418 (L) 01/17/2025    DDIMER 2.77 (H) 01/25/2025    ESRML 56 (H) 03/13/2025    CRP 1.60 (H) 03/13/2025    MG 2.6 06/10/2025    PHOS 3.7 06/10/2025    TROPHS 16 06/08/2025    B12 1,058 (H) 02/04/2025       No results found.            Assessment & Plan:     1.       Probably AVM causing GI bleed and GI blood-loss anemia, severe, requiring transfusion and causing dyspnea. Await double balloon eneteroscopy at [facility]; none here  2.       Acute hypoxemic respiratory failure perhaps from COPD and anemia.  Wean off o2  3.       CHF.  Currently the type is unknown but it appears to me that it may be an acute on chronic diastolic congestive heart failure. Cardiology signed off  4.       Hypertension.  Given bleeding, will continue medications.  5.       Rheumatoid arthritis.  We will start stress doses of steroids.  6.       Diabetes.  Will cover with sliding scale and D5 if she is made n.p.o.  7.       Code Status:  Full Code.  Discussed with patient during this hospitalization with her daughter present.      Complex mdm; coordinating care with nurse and counseling pt about endo/o2    DARRYL CARVAJAL MD

## 2025-06-10 NOTE — PROGRESS NOTES
[facility]  part of [facility]    Progress Note      Assessment and Plan:   1.  Acute on chronic respiratory failure-the patient is now requiring supplemental oxygen and she was desaturating to the 70s while at home.  She has moderate to severe centrilobular emphysema and prior history of venous thromboembolism status post IVC filter in a patient who cannot tolerate full anticoagulation.  She remains on baby aspirin.  Her dyspnea is also confounded by the severe anemia and tendency to pulmonary edema with heart failure with preserved ejection fraction.  The patient is breathing better today and the hemoglobin is improved.  Gastroenterology will not be performing further procedure now.  The IVC filter is still in place.  Breathing better.  No complaints but still on oxygen.     Recommendations:  1.  Nebulizer therapy  2.  Transfusion  3.  Conservative management without scope at this moment  4.  Serial hemoglobin  5.  Will follow clinically.  6.  Taper oxygen  7.  Discharge planning.     2.  DVT prophylaxis-SCDs     3.  Severe anemia-as above, packed red blood cells and await GI opinion.  Following up at [facility] for long enteroscopy.     4.  History of strokes-on baby aspirin     6.  History of venous thromboembolism status post filter     7.  CODE STATUS-full      Subjective:   Chester Bautista is a(n) 85 year old female who is breathing better    Objective:   Blood pressure 160/66, pulse 85, temperature 98 °F (36.7 °C), temperature source Oral, resp. rate 18, height 5' 1\" (1.549 m), weight 136 lb 8 oz (61.9 kg), SpO2 97%.    Physical Exam alert black female  HEENT examination is unremarkable with pupils equal round and reactive to light and accommodation.   Neck without adenopathy, thyromegaly, JVD nor bruit.   Lungs diminished to auscultation and percussion.  Cariac regular rate and rhythm no murmur.   Abdomen nontender, without hepatosplenomegaly and no mass appreciable.   Extremities  without clubbing cyanosis nor edema.   Neurologic grossly intact with symmetric tone and strength and reflex.  Skin without gross abnormality     Results:     Lab Results   Component Value Date    WBC 11.5 06/10/2025    HGB 7.9 06/10/2025    HCT 25.6 06/10/2025    .0 06/10/2025    CREATSERUM 1.06 06/10/2025    BUN 16 06/10/2025     06/10/2025    K 4.1 06/10/2025     06/10/2025    CO2 30.0 06/10/2025     06/10/2025    CA 8.9 06/10/2025    MG 2.6 06/10/2025    PHOS 3.7 06/10/2025       Chapin Parker MD  Medical Director, Critical Care, [facility]  Medical Director, [facility]  Pager: [phone]

## 2025-06-10 NOTE — TELEPHONE ENCOUNTER
Called and spoke with the patient's daughter, Brea. Told her we will cancel 6/11, tomorrow's appointments for labs, injection and follow up with BILL Garces since she's currently hospitalized. Instructed her to call back our office to reschedule once Prudencio is discharged. She verbalized understanding and thanked me for the call. Appointments for 6/11 were cancelled.

## 2025-06-10 NOTE — PROGRESS NOTES
[facility]     Gastroenterology Progress Note    Chester Bautista Patient Status:  Inpatient    1940 MRN [id number]   Location [facility]5W Attending Carole Richardson,*   Hosp Day # 2 PCP Heike Sorto MD       Subjective:   Notes dark brown stool. No abd pain, n/v. No fever.     Objective:   Blood pressure 153/60, pulse 90, temperature 97.8 °F (36.6 °C), temperature source Oral, resp. rate 18, height 5' 1\" (1.549 m), weight 136 lb 8 oz (61.9 kg), SpO2 95%. Body mass index is 25.79 kg/m².    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 She has intermittent dark stools but denies melena. No abdominal pain. Hemoglobin 6.5 status post 1 unit PRBC to hemoglobin 8.0. Suspect acute on chronic anemia secondary to GI AVM disease. She has had multiple bidirectional endoscopies and video capsule endoscopies here. She is on medical management with octreotide monthly. Give IV iron. She has an appointment scheduled with [facility] in a few weeks to discuss the utility of balloon enteroscopy. Reached out to [facility] to see if earlier appt available.    Ok to discharge from my stance. Will sign off, please call with questions.     Zoila Mcqueen MD  [facility] Gastroenterology      Results:     Lab Results   Component Value Date    WBC 11.5 (H) 06/10/2025    HGB 7.9 (L) 06/10/2025    HCT 25.6 (L) 06/10/2025    .0 06/10/2025    CREATSERUM 1.06 (H) 06/10/2025    BUN 16 06/10/2025      06/10/2025    K 4.1 06/10/2025     06/10/2025    CO2 30.0 06/10/2025     (H) 06/10/2025    CA 8.9 06/10/2025    ALB 3.5 06/09/2025    ALKPHO 67 06/09/2025    BILT 0.4 06/09/2025    TP 6.4 06/09/2025    AST 48 (H) 06/09/2025    ALT 25 06/09/2025    PTT 30.4 06/08/2025    INR 1.01 06/09/2025    T4F 1.2 01/17/2025    TSH 0.418 (L) 01/17/2025    DDIMER 2.77 (H) 01/25/2025    ESRML 56 (H) 03/13/2025    CRP 1.60 (H) 03/13/2025    MG 2.6 06/10/2025    PHOS 3.7 06/10/2025    B12 1,058 (H) 02/04/2025       XR CHEST AP PORTABLE  (CPT=71045)  Result Date: 6/8/2025  CONCLUSION:  1. Bilateral patchy opacities without a significant change.  Differential includes infection, edema and fibrosis.    Dictated by (CST): Michael Gonzalez MD on 6/08/2025 at 2:16 PM     Finalized by (CST): Michael Gonzalez MD on 6/08/2025 at 2:17 PM          EKG  Result Date: 6/9/2025  Normal sinus rhythm Possible Anterior infarct (cited on or before 22-OCT-2024) Abnormal ECG When compared with ECG of 29-APR-2025 10:07, No significant change was found Confirmed by TERESSA CASTANEDA CASH ([id number]) on 6/9/2025 10:26:23 AM

## 2025-06-10 NOTE — PAYOR COMM NOTE
--------------  ADMISSION REVIEW     Payor: TRINO ARREGUIN O  Subscriber #:  [id number]  Authorization Number: [id number]    Admit date: 6/8/25  Admit time:  3:39 PM       ED Provider Notes        History     HPI  85-year-old female with a history of COPD, diabetes, hyperlipidemia, recurrent GI bleeds due to AVMs came in today with due to patient having shortness of breath for the past week worse when lying down.  No fevers.  Patient also states she is been having melena over the past week and when they checked her oxygen today it was in the 70s.  These are typical symptoms of her 1 or blood counts are low and she needs a blood transfusion.  No vomiting blood.       ED Triage Vitals [06/08/25 1248]   BP (!) 169/67   Pulse 88   Resp 20   Temp 98.2 °F (36.8 °C)   Temp src Oral   SpO2 90 %   O2 Device None (Room air)   Pulmonary:      Breath sounds: Rales present.   Abdominal:      Palpations: Abdomen is soft.   Skin:     General: Skin is warm and dry.      Coloration: Skin is pale.   Neurological:      General: No focal deficit present.      Mental Status: She is alert and oriented to person, place, and time.     Labs Reviewed   CBC WITH DIFFERENTIAL WITH PLATELET - Abnormal; Notable for the following components:       Result Value    RBC 2.39 (*)     HGB 6.5 (*)     HCT 22.2 (*)     MCHC 29.3 (*)     RDW-SD 65.1 (*)     RDW 19.5 (*)     All other components within normal limits   COMP METABOLIC PANEL (14) - Abnormal; Notable for the following components:    Glucose 222 (*)     Potassium 2.9 (*)     Calcium, Total 8.3 (*)     Calculated Osmolality 301 (*)     eGFR-Cr 57 (*)     All other components within normal limits   PRO BETA NATRIURETIC PEPTIDE - Abnormal; Notable for the following components:    Pro-Beta Natriuretic Peptide 1,576 (*)     All other components within normal limits   RBC MORPHOLOGY SCAN - Abnormal; Notable for the following components:    RBC Morphology See morphology below (*)     Macrocytosis 2+ (*)     EKG    Rate, intervals and axes as noted on EKG Report.  Rate: 84  Rhythm: Sinus Rhythm  Reading: Normal sinus rhythm, heart rate 84, normal intervals, normal axis    Imaging Results Available and Reviewed while in ED: XR CHEST AP PORTABLE  (CPT=71045)  Result Date: 6/8/2025  CONCLUSION:  1. Bilateral patchy opacities without a significant change.  Differential includes infection, edema and fibrosis.    Dictated by (CST): Michael Gonzalez MD on 6/08/2025 at 2:16 PM     Finalized by (CST): Michael Gonzalez MD on 6/08/2025 at 2:17 PM        ED Medications Administered:   Medications   pantoprazole (Protonix) 40 mg in sodium chloride 0.9% PF 10 mL IV push (40 mg Intravenous Given 6/8/25 1350)   furosemide (Lasix) 10 mg/mL injection 40 mg (40 mg Intravenous Given 6/8/25 1428)   potassium chloride (Klor-Con M20) tab 40 mEq (40 mEq Oral Given 6/8/25 1442)      Disposition and Plan     Clinical Impression:  1. Anemia, unspecified type    2. Acute pulmonary edema (HCC)    3. Hypervolemia, unspecified hypervolemia type    4. Gastrointestinal hemorrhage, unspecified gastrointestinal hemorrhage type    5. Melena    6. Hypoxemia      Disposition:  Admit    HISTORY AND PHYSICAL EXAMINATION     Please note complex medical decision-making was involved coordinating care with Dr. Severino, interviewing patient and with her permission her daughter, reviewing EKG tracings and chest x-ray images, admitting patient to the hospital.     CHIEF COMPLAINT:  Black stool and shortness of breath.     ADMITTING DIAGNOSIS:  Gastrointestinal bleed of uncertain origin probably related to arteriovenous malformations that she has had previously with critical anemia causing shortness of breath requiring emergent transfusion.     HISTORY OF PRESENT ILLNESS:  This is a very pleasant 85-year-old  female who looks much younger than her stated age who presents with a history of having recurrent AVM-related bleeding.  She has been more short of  breath for the last week, worse when lying down, and had black stools, had decreased O2 saturations on room air in the 70s, and otherwise now will be getting blood transfusions and admitted to the hospital.  She will be seen by Cardiology, Pulmonary, and GI.     PAST MEDICAL HISTORY:  Significant for COPD with pulmonary fibrosis, diastolic dysfunction, CVA, type 2 diabetes, chronic kidney disease, arthritis, anxiety, hyperlipidemia, AV malformations, hypertension, rheumatoid arthritis, anemia of chronic kidney disease, chronic GI blood loss, subarachnoid hemorrhage, 2 cerebellar infarcts, shoulder rotator cuff tear.      PAST SURGICAL HISTORY:  Left artery stent; cataract procedures; IVC filter; total abdominal hysterectomy; right breast lumpectomy plus radiation; 2 total knee arthroplasties, 1 on each side; right foot bunionectomy; Nissen fundoplication.     MEDICATIONS:  At home include Tylenol 650 every 6 hours as needed; Ventolin 2 puffs every 4 hours as needed; aspirin 81 mg daily; Tessalon Perles 200 mg 3 times a day as needed; Coreg 25 mg twice a day; certolizumab every 28 days which will be held, most recent dose was ; fluticasone 2 sprays each nostril twice a day; gabapentin 100 mg nightly; glipizide 2.5 mg every 12 hours; hydralazine 25 mg every 8 hours as needed; Humalog 3 units 3 times a day; atropine nebulizer every 12 hours as needed; Lantus 10 units daily; lidocaine patch to affected area, do not wear in MRI and apply to wherever it hurts; losartan 100 mg daily; Mag oxide 400 mg twice a day; Singulair 10 mg daily; Protonix 40 mg twice a day; prednisone taper, is currently now off of the taper, and 5 mg of prednisone daily; Requip 0.25 mg every evening; Crestor 20 mg nightly.     ALLERGIES:  Keflex, penicillin, metformin, and olmesartan gave her swelling of her mouth.      FAMILY HISTORY:  Father  at 81 of an MI in which she terms old age.  Mother  at 86 of heart disease and valve  disorder.     SOCIAL HISTORY:  Quit smoking 30 years ago, had approximately a 30 pack-year history.  Does not drink alcohol.     REVIEW OF SYSTEMS:  She reports melenic stool, difficulty sleeping, especially in the last night with orthopnea-type symptoms.  No nausea or vomiting.  She is able to urinate.  She is not bleeding from any other part of her body.  She has no unusual rashes and other complaints in the 13 systems reviewed are negative.  Chronic right ankle swelling.       PHYSICAL EXAMINATION:    GENERAL:  Well-nourished, well-developed, friendly  female who appears much younger than her stated age and is in no distress.  VITAL SIGNS:  Temperature is 98.2, pulse 87, respiratory rate 19, blood pressure is 154/74.  She is 100% on 2L but on admission was 70% on room air.    HEENT:  Normocephalic, atraumatic.  Anicteric.  Oropharynx is moist.  NECK:  Mildly stiff in all 4 directions.  LUNGS:  Crackles in both bases.  HEART:  Normal S1, S2.  I hear an S3.  There is a soft systolic murmur.  ABDOMEN:  Soft and essentially nontender.  BACK:  No dorsal spine or CVA tenderness.  GENITOURINARY/RECTAL:  Not performed.   BREASTS:  Not performed.  EXTREMITIES:  No cyanosis.  There is edema of the right leg, which she says is chronic.    NEUROLOGIC:  Alert and oriented x3, friendly and cooperative, with no focal deficits, with 4/5 motor strength in her upper extremities.  Can move both lower extremities in the bed.   SKIN:  No obvious rashes or tattoos.  PSYCHIATRIC:  She is not unusually anxious or depressed.  LYMPHATICS:  No submandibular lymphadenopathy.     LABORATORY DATA:  Sodium 142, potassium 2.9, chloride 105, CO2 of 28, BUN 13, creatinine 0.97.  BNP is 1576.  White count is 9600, hemoglobin 6.5, platelets 328,000.     ASSESSMENT AND PLAN:    1.       Probably AVM causing GI bleed and GI blood-loss anemia, severe, requiring transfusion and causing dyspnea.  2.       Acute hypoxemic respiratory  failure perhaps from COPD and anemia.  Continue oxygen.  3.       CHF.  Currently the type is unknown but it appears to me that it may be an acute on chronic diastolic congestive heart failure.  Await Cardiology opinion and diuretics.  4.       Hypertension.  Given bleeding, will continue medications.  5.       Rheumatoid arthritis.  We will start stress doses of steroids.  6.       Diabetes.  Will cover with sliding scale and D5 if she is made n.p.o.        6/9/25  Constitutional:  Positive for activity change and appetite change.      Blood pressure 143/70, pulse 78, temperature 98.2 °F (36.8 °C), temperature source Oral, resp. rate 19, height 5' 1\" (1.549 m), weight 136 lb 6.4 oz (61.9 kg), SpO2 95%.    HENT:      Head: Normocephalic and atraumatic.   Cardiovascular:      Rate and Rhythm: Normal rate.      Pulses: Normal pulses.   Pulmonary:      Breath sounds: Rhonchi present. No wheezing.   Abdominal:      Tenderness: There is no abdominal tenderness.   Skin:     General: Skin is warm and dry.      Capillary Refill: Capillary refill takes less than 2 seconds.   Neurological:      General: No focal deficit present.      Mental Status: She is alert and oriented to person, place, and time.   Psychiatric:         Behavior: Behavior normal.      Lab Results   Component Value Date     WBC 10.5 06/09/2025     HGB 8.0 (L) 06/09/2025     HCT 26.6 (L) 06/09/2025     .0 06/09/2025     CREATSERUM 1.02 06/09/2025     BUN 14 06/09/2025      06/09/2025     K 4.6 06/09/2025     K 4.6 06/09/2025      06/09/2025     CO2 29.0 06/09/2025      (H) 06/09/2025     CA 8.6 (L) 06/09/2025     ALB 3.5 06/09/2025     ALKPHO 67 06/09/2025     BILT 0.4 06/09/2025     TP 6.4 06/09/2025     AST 48 (H) 06/09/2025     ALT 25 06/09/2025     PTT 30.4 06/08/2025     INR 1.01 06/09/2025     MG 1.5 (L) 06/09/2025     MG 1.5 (L) 06/09/2025     PHOS 2.9 06/09/2025     TROPHS 16 06/08/2025       XR CHEST AP PORTABLE   (CPT=71045)  Result Date: 6/8/2025  CONCLUSION:  1. Bilateral patchy opacities without a significant change.  Differential includes infection, edema and fibrosis.    Dictated by (CST): Michael Gonzalez MD on 6/08/2025 at 2:16 PM     Finalized by (CST): Michael Gonzalez MD on 6/08/2025 at 2:17 PM           EKG  Result Date: 6/9/2025  Normal sinus rhythm Possible Anterior infarct (cited on or before 22-OCT-2024) Abnormal ECG When compared with ECG of 29-APR-2025 10:07, No significant change was found Confirmed by TERESSA CASTANEDA, SERVIN ([id number]) on 6/9/2025 10:26:23 AM        Assessment & Plan:      1.       Probably AVM causing GI bleed and GI blood-loss anemia, severe, requiring transfusion and causing dyspnea. Await endoscopy  2.       Acute hypoxemic respiratory failure perhaps from COPD and anemia.  Continue oxygen.  3.       CHF.  Currently the type is unknown but it appears to me that it may be an acute on chronic diastolic congestive heart failure.  Await echo and on diuretics.  4.       Hypertension.  Given bleeding, will continue medications.  5.       Rheumatoid arthritis.  We will start stress doses of steroids.  6.       Diabetes.  Will cover with sliding scale and D5 if she is made n.p.o.      CARDIOLOGY  ASSESSMENT:  Presented with dyspnea and decreased oxygenation, on admission was found to have a hgb of 6.5 and received 1 units PRBCs. Cardiology was consulted to volume management post transfusion and dyspnea.      Dyspnea   - Multifactorial with Anemia and pulmonary history   - Resolved post transfusion      Acute on Chronic Anemia/ BUZZ  Hx Recurrent GIB 2/2 AVM  - Scheduled for lung scope to tx AVMS in small bowel at [facility]   - Hgb on admission 6.5 s/p 1 units PRBC, Hgb 8 today, no overt bleeding   - GI consult pending   - Follows [facility]/ Dr. 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 2025  COPD/ Pulmonary Fibrosis      PLAN:  - Appears euvolemic from a cardiology standpoint. ECHO pending reassess LVEF And valvular functioning   - Will continue PRN IV Lasix post-transfusions      MEDICATIONS ADMINISTERED IN LAST 1 DAY:  benzonatate (Tessalon) cap 200 mg       Date Action Dose Route User    6/10/2025 0821 Given 200 mg Oral Olayinka Cruz RN          carvedilol (Coreg) tab 25 mg       Date Action Dose Route User    6/10/2025 0823 Given 25 mg Oral Olayinka Cruz RN    6/9/2025 1734 Given 25 mg Oral Olayinka Cruz RN          fluticasone-salmeterol (Advair Diskus) 250-50 MCG/ACT inhaler 1 puff       Date Action Dose Route User    6/10/2025 0834 Given 1 puff Inhalation Olayinka Cruz RN    6/9/2025 2146 Given 1 puff Inhalation Magi Godoy RN          gabapentin (Neurontin) cap 100 mg       Date Action Dose Route User    6/9/2025 2144 Given 100 mg Oral Magi Godoy RN          hydrALAZINE (Apresoline) tab 25 mg       Date Action Dose Route User    6/10/2025 0502 Given 25 mg Oral Magi Godoy RN    6/9/2025 2145 Given 25 mg Oral Magi Godoy RN    6/9/2025 1506 Given 25 mg Oral Olayinka Cruz RN          hydrocortisone Na succinate PF (Solu-CORTEF) injection 25 mg       Date Action Dose Route User    6/10/2025 0821 Given 25 mg Intravenous Olayinka Cruz RN    6/10/2025 0015 Given 25 mg Intravenous Magi Godoy RN    6/9/2025 1718 Given 25 mg Intravenous Olayinka Cruz RN          insulin aspart (NovoLOG) 100 Units/mL FlexPen 3 Units       Date Action Dose Route User    6/10/2025 0821 Given 3 Units Subcutaneous (Left Lower Arm) Olayinka Cruz RN    6/9/2025 1728 Given 3 Units Subcutaneous (Left Lower Abdomen) Anthony, Olayinka, RN    6/9/2025 1331 Given 3 Units Subcutaneous (Left Lower Abdomen) Cruz,  MAGI Bhagat          insulin aspart (NovoLOG) 100 Units/mL FlexPen 1-11 Units       Date Action Dose Route User    6/10/2025 0821 Given 5 Units Subcutaneous (Left Upper Arm) Olayinka Cruz RN    6/9/2025 1729 Given 7 Units Subcutaneous (Left Lower Abdomen) Olayinka Cruz RN          insulin degludec (Tresiba) 100 units/mL flextouch 10 Units       Date Action Dose Route User    6/10/2025 0822 Given 10 Units Subcutaneous (Left Lower Abdomen) Olayinka Cruz RN          magnesium oxide (Mag-Ox) tab 400 mg       Date Action Dose Route User    6/10/2025 0822 Given 400 mg Oral Olayinka Cruz RN    6/9/2025 2144 Given 400 mg Oral WaltMagi paniagua RN          montelukast (Singulair) tab 10 mg       Date Action Dose Route User    6/9/2025 2144 Given 10 mg Oral Magi Godoy RN          pantoprazole (Protonix) 40 mg in sodium chloride 0.9% PF 10 mL IV push       Date Action Dose Route User    6/10/2025 0459 Given 40 mg Intravenous Magi Godoy RN    6/9/2025 1718 Given 40 mg Intravenous Olayinka Cruz RN          rOPINIRole (Requip) tab 0.25 mg       Date Action Dose Route User    6/9/2025 2144 Given 0.25 mg Oral Magi Godoy RN          rosuvastatin (Crestor) tab 20 mg       Date Action Dose Route User    6/9/2025 2144 Given 20 mg Oral Magi Godoy RN          sodium ferric gluconate (Ferrlecit) 125 mg in sodium chloride 0.9% 100mL IVPB premix       Date Action Dose Route User    6/10/2025 0831 New Bag 125 mg Intravenous Olayinka Cruz RN    6/9/2025 1528 New Bag 125 mg Intravenous Olayinka Cruz RN            Vitals (last day)       Date/Time Temp Pulse Resp BP SpO2 Weight O2 Device O2 Flow Rate (L/min) Who    06/10/25 0815 97.8 °F (36.6 °C) 90 18 153/60 95 % -- Nasal cannula 2 L/min AF    06/10/25 0542 -- 81 -- -- 94 % -- Nasal cannula 2 L/min     06/10/25 0542 -- -- -- -- -- 136 lb 8 oz (61.9 kg) -- --     06/10/25 0538 -- 83 -- -- 82 % -- None (Room air) --     06/10/25 0500 98.9 °F  (37.2 °C) 81 16 158/66 100 % -- Nasal cannula 2 L/min     06/10/25 0010 98.4 °F (36.9 °C) 81 18 146/67 100 % -- Nasal cannula 2 L/min     06/09/25 2316 -- 84 -- -- 94 % -- Nasal cannula 2 L/min     06/09/25 2314 -- 90 -- -- 85 % -- Nasal cannula 1 L/min     06/09/25 2313 -- 87 -- -- 87 % -- Nasal cannula 1 L/min     06/09/25 2309 -- 80 -- -- 86 % -- None (Room air) --     06/09/25 2141 98 °F (36.7 °C) 75 20 149/72 100 % -- None (Room air) --     06/09/25 1934 -- -- -- -- 93 % -- Nasal cannula 2 L/min     06/09/25 1933 -- 85 -- -- 86 % -- Nasal cannula 2 L/min     06/09/25 1931 -- 88 -- -- 78 % -- None (Room air) --     06/09/25 1537 97.9 °F (36.6 °C) 80 18 155/85 94 % -- None (Room air) --     06/09/25 0949 98.2 °F (36.8 °C) 78 19 143/70 96 % -- Nasal cannula 1 L/min AF    06/09/25 0503 -- 84 -- -- 95 % -- Nasal cannula 1 L/min AB    06/09/25 0501 -- 88 -- -- 88 % -- -- -- AB    06/09/25 0452 98.7 °F (37.1 °C) 81 20 159/67 98 % -- Nasal cannula 1 L/min AB    06/09/25 0053 98.5 °F (36.9 °C) 83 18 161/66 98 % -- Nasal cannula 1 L/min AB       Blood Transfusion Record       Product Unit Status Volume Start End            Transfuse RBC       25  [id number]  [id number] Completed 06/08/25 1734 314.67 mL 06/08/25 1500 06/08/25 1732

## 2025-06-10 NOTE — PROGRESS NOTES
Progress Note  Chester Bautista Patient Status:  Inpatient    1940 MRN [id number]   Location [facility]5W Attending Carole Richardson,*   Hosp Day # 2 PCP Heike Sorto MD     SUBJECTIVE:    Denies orthopnea, dyspnea, or weakness. No bloody BMs overnight.     VITALS:  /60 (BP Location: Left arm)   Pulse 90   Temp 97.8 °F (36.6 °C) (Oral)   Resp 18   Ht 5' 1\" (1.549 m)   Wt 136 lb 8 oz (61.9 kg)   SpO2 95%   BMI 25.79 kg/m²   INTAKE/OUTPUT:    Intake/Output Summary (Last 24 hours) at 6/10/2025 1022  Last data filed at 6/10/2025 0932  Gross per 24 hour   Intake 1418 ml   Output --   Net 1418 ml     Last 3 Weights   06/10/25 0542 136 lb 8 oz (61.9 kg)   25 0503 136 lb 6.4 oz (61.9 kg)   25 1546 136 lb 14.4 oz (62.1 kg)   25 1248 138 lb (62.6 kg)   25 1231 137 lb (62.1 kg)   25 1328 137 lb 6.4 oz (62.3 kg)     LABS:  Recent Labs   Lab 25  1330 25  0103 25  0505 06/10/25  0511   *  --   --  220* 180*   BUN 13  --   --  14 16   CREATSERUM 0.97  --   --  1.02 1.06*   EGFRCR 57*  --   --  54* 51*   CA 8.3*  --   --  8.6* 8.9     --   --  142 140   K 2.9*   < > 4.3 4.6  4.6 4.1     --   --  107 104   CO2 28.0  --   --  29.0 30.0    < > = values in this interval not displayed.     Recent Labs   Lab 25  1330 25  21125  0505 25  1248 06/10/25  0511   RBC 2.39*  --  2.91*  --  2.87*   HGB 6.5*   < > 8.0* 7.8* 7.9*   HCT 22.2*  --  26.6*  --  25.6*   MCV 92.9  --  91.4  --  89.2   MCH 27.2  --  27.5  --  27.5   MCHC 29.3*  --  30.1*  --  30.9*   RDW 19.5*  --  19.0*  --  18.9*   NEPRELIM 6.77  --  9.44*  --  10.14*   WBC 9.6  --  10.5  --  11.5*   .0  --  294.0  --  280.0    < > = values in this interval not displayed.     No results for input(s): \"TROP\", \"CK\" in the last 168 hours.  DIAGNOSTICS:  TELEMETRY: SR    ROS: Negative unless noted above   PHYSICAL  EXAM:  General: Alert and oriented x 3. No apparent distress.  HEENT: Normocephalic, sclera are nonicteric. Hearing appropriate bilaterally.  Neck: No JVD or Carotid bruits. Trachea midline.   Cardiac: Regular rate and rhythm. S1, S2 auscultated. No murmurs, rubs, or gallops appreciated.   Lungs: Clear without wheezes, rales, rhonchi or dullness. Chest expansion symmetrical. Regular effort.  Abdomen: Soft, non-tender, +BS. No hepatosplenomegaly or appreciable masses.   Extremities: Without clubbing, cyanosis. Peripheral pulses are 2+. Edema none   Neurologic: Motor and sensory nerves grossly intact.   Psych: Appropriate affect   Skin: Warm and dry. No obvious lesions, wounds, or ulcerations.     MEDICATIONS:  Scheduled Medications[1]  Medication Infusions[2]    ASSESSMENT:    Presented with dyspnea and decreased oxygenation, on admission was found to have a hgb of 6.5 and received 1 units PRBCs. Cardiology was consulted to volume management post transfusion and dyspnea.     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 2025  COPD/ Pulmonary Fibrosis     PLAN:  - Appears euvolemic from a cardiology standpoint. Cardiology will follow peripherally.   - Increase hydralazine    Plan of care discussed with patient and RN.     Kristel Osorio, MSN, FNP-BC, CCK  06/10/25   12:31 PM             [1]    rosuvastatin  10 mg Oral Nightly    sodium ferric gluconate  125 mg  Intravenous Daily    carvedilol  25 mg Oral BID with meals    fluticasone-salmeterol  1 puff Inhalation BID    gabapentin  100 mg Oral Nightly    hydrALAZINE  25 mg Oral Q8H MARGE    lidocaine-menthol  1 patch Transdermal Daily    magnesium oxide  400 mg Oral BID    montelukast  10 mg Oral Nightly    rOPINIRole  0.25 mg Oral QPM    hydrocortisone sodium succinate  25 mg Intravenous Q8H MARGE    pantoprazole  40 mg Intravenous Q12H    insulin aspart  3 Units Subcutaneous TID CC    insulin aspart  1-11 Units Subcutaneous TID CC    insulin degludec  10 Units Subcutaneous Daily   [2]

## 2025-06-10 NOTE — CM/SW NOTE
HH provided.  Will need choice prior to dc.    Laly Parker MBA BSN RN [facility]  RN Case Manager [facility]

## 2025-06-10 NOTE — PLAN OF CARE
Problem: Patient Centered Care  Goal: Patient preferences are identified and integrated in the patient's plan of care  Description: Interventions:  - What would you like us to know as we care for you? From home w/ family  - Provide timely, complete, and accurate information to patient/family  - Incorporate patient and family knowledge, values, beliefs, and cultural backgrounds into the planning and delivery of care  - Encourage patient/family to participate in care and decision-making at the level they choose  - Honor patient and family perspectives and choices  Outcome: Progressing     Problem: Diabetes/Glucose Control  Goal: Glucose maintained within prescribed range  Description: INTERVENTIONS:  - Monitor Blood Glucose as ordered  - Assess for signs and symptoms of hyperglycemia and hypoglycemia  - Administer ordered medications to maintain glucose within target range  - Assess barriers to adequate nutritional intake and initiate nutrition consult as needed  - Instruct patient on self management of diabetes  Outcome: Progressing    Problem: Patient/Family Goals  Goal: Patient/Family Long Term Goal  Description: Patient's Long Term Goal: discharge from hospital     Interventions:  - Monitor vital signs, labs, test results  - Monitor blood glucose levels  - Oxygen and respiratory therapy as needed  - Pain management  - Follow MD orders  - Administer medications per MD order  - Diagnostics per order  - Update /  inform patient on plan of care  - Discharge planning   - See additional Care Plan goals for specific interventions  Outcome: Progressing    Goal: Patient/Family Short Term Goal  Description: Patient's Short Term Goal: feel better    Interventions:   - Monitor vital signs, labs, test results  - Monitor blood glucose levels  - Oxygen and respiratory therapy as needed  - Pain management  - Follow MD orders  - Administer medications per MD order  - Diagnostics per order  - Update /  inform patient on plan of  care  - Discharge planning   - See additional Care Plan goals for specific interventions  Outcome: Progressing       Problem: PAIN - ADULT  Goal: Verbalizes/displays adequate comfort level or patient's stated pain goal  Description: INTERVENTIONS:  - Encourage pt to monitor pain and request assistance  - Assess pain using appropriate pain scale  - Administer analgesics based on type and severity of pain and evaluate response  - Implement non-pharmacological measures as appropriate and evaluate response  - Consider cultural and social influences on pain and pain management  - Manage/alleviate anxiety  - Utilize distraction and/or relaxation techniques  - Monitor for opioid side effects  - Notify MD/LIP if interventions unsuccessful or patient reports new pain  - Anticipate increased pain with activity and pre-medicate as appropriate  Outcome: Progressing     Problem: SAFETY ADULT - FALL  Goal: Free from fall injury  Description: INTERVENTIONS:  - Assess pt frequently for physical needs  - Identify cognitive and physical deficits and behaviors that affect risk of falls.  - [place] fall precautions as indicated by assessment.  - Educate pt/family on patient safety including physical limitations  - Instruct pt to call for assistance with activity based on assessment  - Modify environment to reduce risk of injury  - Provide assistive devices as appropriate  - Consider OT/PT consult to assist with strengthening/mobility  - Encourage toileting schedule  Outcome: Progressing     Problem: DISCHARGE PLANNING  Goal: Discharge to home or other facility with appropriate resources  Description: INTERVENTIONS:  - Identify barriers to discharge w/pt and caregiver  - Include patient/family/discharge partner in discharge planning  - Arrange for needed discharge resources and transportation as appropriate  - Identify discharge learning needs (meds, wound care, etc)  - Arrange for interpreters to assist at discharge as needed  -  Consider post-discharge preferences of patient/family/discharge partner  - Complete POLST form as appropriate  - Assess patient's ability to be responsible for managing their own health  - Refer to Case Management Department for coordinating discharge planning if the patient needs post-hospital services based on physician/LIP order or complex needs related to functional status, cognitive ability or social support system  Outcome: Progressing     Problem: CARDIOVASCULAR - ADULT  Goal: Maintains optimal cardiac output and hemodynamic stability  Description: INTERVENTIONS:  - Monitor vital signs, rhythm, and trends  - Monitor for bleeding, hypotension and signs of decreased cardiac output  - Evaluate effectiveness of vasoactive medications to optimize hemodynamic stability  - Monitor arterial and/or venous puncture sites for bleeding and/or hematoma  - Assess quality of pulses, skin color and temperature  - Assess for signs of decreased coronary artery perfusion - ex. Angina  - Evaluate fluid balance, assess for edema, trend weights  Outcome: Progressing    Goal: Absence of cardiac arrhythmias or at baseline  Description: INTERVENTIONS:  - Continuous cardiac monitoring, monitor vital signs, obtain 12 lead EKG if indicated  - Evaluate effectiveness of antiarrhythmic and heart rate control medications as ordered  - Initiate emergency measures for life threatening arrhythmias  - Monitor electrolytes and administer replacement therapy as ordered  Outcome: Progressing     Problem: RESPIRATORY - ADULT  Goal: Achieves optimal ventilation and oxygenation  Description: INTERVENTIONS:  - Assess for changes in respiratory status  - Assess for changes in mentation and behavior  - Position to facilitate oxygenation and minimize respiratory effort  - Oxygen supplementation based on oxygen saturation or ABGs  - Provide Smoking Cessation handout, if applicable  - Encourage broncho-pulmonary hygiene including cough, deep breathe,  Incentive Spirometry  - Assess the need for suctioning and perform as needed  - Assess and instruct to report SOB or any respiratory difficulty  - Respiratory Therapy support as indicated  - Manage/alleviate anxiety  - Monitor for signs/symptoms of CO2 retention  Outcome: Progressing

## 2025-06-11 ENCOUNTER — APPOINTMENT (OUTPATIENT)
Age: 85
End: 2025-06-11
Attending: INTERNAL MEDICINE
Payer: MEDICARE

## 2025-06-11 VITALS
DIASTOLIC BLOOD PRESSURE: 80 MMHG | TEMPERATURE: 98 F | SYSTOLIC BLOOD PRESSURE: 149 MMHG | OXYGEN SATURATION: 92 % | HEART RATE: 75 BPM | BODY MASS INDEX: 25.05 KG/M2 | RESPIRATION RATE: 18 BRPM | HEIGHT: 61 IN | WEIGHT: 132.69 LBS

## 2025-06-11 LAB
ANION GAP SERPL CALC-SCNC: 7 MMOL/L (ref 0–18)
BASOPHILS # BLD AUTO: 0.01 X10(3) UL (ref 0–0.2)
BASOPHILS NFR BLD AUTO: 0.1 %
BUN BLD-MCNC: 15 MG/DL (ref 9–23)
BUN/CREAT SERPL: 13.8 (ref 10–20)
CALCIUM BLD-MCNC: 8.9 MG/DL (ref 8.7–10.4)
CHLORIDE SERPL-SCNC: 103 MMOL/L (ref 98–112)
CO2 SERPL-SCNC: 30 MMOL/L (ref 21–32)
CREAT BLD-MCNC: 1.09 MG/DL (ref 0.55–1.02)
DEPRECATED RDW RBC AUTO: 63.4 FL (ref 35.1–46.3)
EGFRCR SERPLBLD CKD-EPI 2021: 49 ML/MIN/1.73M2 (ref 60–?)
EOSINOPHIL # BLD AUTO: 0.01 X10(3) UL (ref 0–0.7)
EOSINOPHIL NFR BLD AUTO: 0.1 %
ERYTHROCYTE [DISTWIDTH] IN BLOOD BY AUTOMATED COUNT: 18.9 % (ref 11–15)
GLUCOSE BLD-MCNC: 178 MG/DL (ref 70–99)
GLUCOSE BLDC GLUCOMTR-MCNC: 136 MG/DL (ref 70–99)
GLUCOSE BLDC GLUCOMTR-MCNC: 211 MG/DL (ref 70–99)
HCT VFR BLD AUTO: 26 % (ref 35–48)
HGB BLD-MCNC: 7.7 G/DL (ref 12–16)
IMM GRANULOCYTES # BLD AUTO: 0.08 X10(3) UL (ref 0–1)
IMM GRANULOCYTES NFR BLD: 0.8 %
LYMPHOCYTES # BLD AUTO: 0.71 X10(3) UL (ref 1–4)
LYMPHOCYTES NFR BLD AUTO: 6.9 %
MAGNESIUM SERPL-MCNC: 2.1 MG/DL (ref 1.6–2.6)
MCH RBC QN AUTO: 27.5 PG (ref 26–34)
MCHC RBC AUTO-ENTMCNC: 29.6 G/DL (ref 31–37)
MCV RBC AUTO: 92.9 FL (ref 80–100)
MONOCYTES # BLD AUTO: 0.32 X10(3) UL (ref 0.1–1)
MONOCYTES NFR BLD AUTO: 3.1 %
NEUTROPHILS # BLD AUTO: 9.19 X10 (3) UL (ref 1.5–7.7)
NEUTROPHILS # BLD AUTO: 9.19 X10(3) UL (ref 1.5–7.7)
NEUTROPHILS NFR BLD AUTO: 89 %
OSMOLALITY SERPL CALC.SUM OF ELEC: 295 MOSM/KG (ref 275–295)
PHOSPHATE SERPL-MCNC: 3.9 MG/DL (ref 2.4–5.1)
PLATELET # BLD AUTO: 289 10(3)UL (ref 150–450)
POTASSIUM SERPL-SCNC: 3.8 MMOL/L (ref 3.5–5.1)
RBC # BLD AUTO: 2.8 X10(6)UL (ref 3.8–5.3)
SODIUM SERPL-SCNC: 140 MMOL/L (ref 136–145)
WBC # BLD AUTO: 10.3 X10(3) UL (ref 4–11)

## 2025-06-11 PROCEDURE — 99239 HOSP IP/OBS DSCHRG MGMT >30: CPT | Performed by: HOSPITALIST

## 2025-06-11 PROCEDURE — 99233 SBSQ HOSP IP/OBS HIGH 50: CPT | Performed by: INTERNAL MEDICINE

## 2025-06-11 RX ORDER — POTASSIUM CHLORIDE 1500 MG/1
40 TABLET, EXTENDED RELEASE ORAL ONCE
Status: COMPLETED | OUTPATIENT
Start: 2025-06-11 | End: 2025-06-11

## 2025-06-11 RX ORDER — PREDNISONE 5 MG/1
5 TABLET ORAL
Status: DISCONTINUED | OUTPATIENT
Start: 2025-06-11 | End: 2025-06-11

## 2025-06-11 RX ORDER — FLUTICASONE PROPIONATE AND SALMETEROL 250; 50 UG/1; UG/1
1 POWDER RESPIRATORY (INHALATION) 2 TIMES DAILY
Qty: 60 EACH | Refills: 0 | Status: SHIPPED | OUTPATIENT
Start: 2025-06-11 | End: 2025-07-11

## 2025-06-11 RX ORDER — HYDRALAZINE HYDROCHLORIDE 25 MG/1
25 TABLET, FILM COATED ORAL EVERY 8 HOURS SCHEDULED
Qty: 90 TABLET | Refills: 0 | Status: SHIPPED | OUTPATIENT
Start: 2025-06-11 | End: 2025-07-11

## 2025-06-11 NOTE — DISCHARGE SUMMARY
[facility]  part of [facility]    Discharge Summary    Chester Bautista Patient Status:  Inpatient    1940 MRN [id number]   Location [facility]5W Attending Nelia Hoyt *   Hosp Day # 3 PCP Heike Sorto MD     Date of Admission: 2025 Disposition: Home or Self Care     Date of Discharge: 2025      Admitting Diagnosis: Hypoxemia [R09.02]  Melena [K92.1]  Acute pulmonary edema (HCC) [J81.0]  Gastrointestinal hemorrhage, unspecified gastrointestinal hemorrhage type [K92.2]  Hypervolemia, unspecified hypervolemia type [E87.70]  Anemia, unspecified type [D64.9]    Hospital Discharge Diagnoses: gi bleed, avm, copd, chf diastolic     Hospital Discharge Diagnoses: as above     Lace+ Score: 80  59-90 High Risk  29-58 Medium Risk  0-28   Low Risk.    TCM Follow-Up Recommendation:  LACE > 58: High Risk of readmission after discharge from the hospital.          Lace+ Score: 80  59-90 High Risk  29-58 Medium Risk  0-28   Low Risk    Risk of readmission: Chester Bautista has High Risk of readmission after discharge from the hospital.    Problem List: Problem List[1]    Reason for Admission: sob     Physical Exam:   General appearance: alert, appears stated age and cooperative  Pulmonary:  clear to auscultation bilaterally  Cardiovascular: S1, S2 normal, no murmur, click, rub or gallop, regular rate and rhythm  Abdominal: soft, non-tender; bowel sounds normal; no masses,  no organomegaly  Extremities: extremities normal, atraumatic, no cyanosis or edema  Psychiatric: calm        History of Present Illness:   HISTORY OF PRESENT ILLNESS:  This is a very pleasant 85-year-old  female who looks much younger than her stated age who presents with a history of having recurrent AVM-related bleeding.  She has been more short of breath for the last week, worse when lying down, and had black stools, had decreased O2 saturations on room air in the  70s, and otherwise now will be getting blood transfusions and admitted to the hospital.  She will be seen by Cardiology, Pulmonary, and GI.       Hospital Course:     Probably AVM causing GI bleed and GI blood-loss anemia, severe, requiring transfusion and causing dyspnea. Await double balloon eneteroscopy at [facility]; none here - pt has appt with Dr Gustafson end of month at [facility].   2.       Acute hypoxemic respiratory failure perhaps from COPD and anemia.  Wean off o2- unable to wean with exercise o 2 sat 83% will need home oxygen . Home later today after cleared by Dr Parker   3.       CHF.  Currently the type is unknown but it appears to me that it may be an acute on chronic diastolic congestive heart failure. Cardiology signed off. Received lasix with transfusion   4.       Hypertension.  Given bleeding, will continue medications.  5.       Rheumatoid arthritis.  We will start stress doses of steroids. Transition to prednisone today and f/u with Dr Goodrich for taper as pt reports she is planning this outpt   6.       Diabetes.  Will cover with sliding scale   7.       Code Status:  Full Code.  Discussed with patient during this hospitalization with her daughter present.        Complex mdm      Consultations: Dr Parker, Dr Mcqueen, Dr Wilkinson     Procedures: none    Complications: none    Discharge Condition: Good    Discharge Medications:      Discharge Medications        CONTINUE taking these medications        Instructions Prescription details   BD Pen Needle Tata 2nd Gen 32G X 4 MM Misc  Generic drug: Insulin Pen Needle      1 EACH DAILY. USE DAILY WITH INSULIN   Quantity: 100 each  Refills: 1     Blood Gluc Meter Disp-Strips Nica      Dx. E11.9. Checks qam.   Quantity: 100 each  Refills: 0     OneTouch Delica Lancets 33G Misc      4 x daily   Quantity: 100 each  Refills: 1     Accu-Chek FastClix Lancets Misc      Check sugar once daily as directed (E11.42)   Quantity: 100 each  Refills: 2     OneTouch Verio w/Device Kit       1 each daily. Test 1x daily   Quantity: 1 kit  Refills: 0     Blood Glucose System Adrian Kit      Dx. E11.9. Checks qam.   Quantity: 1 kit  Refills: 0     Blood Glucose Monitoring Suppl w/Device Kit      Dx. E11.9. Checks qam.   Quantity: 1 kit  Refills: 0            ASK your doctor about these medications        Instructions Prescription details   acetaminophen 325 MG Tabs  Commonly known as: Tylenol      Take 2 tablets (650 mg total) by mouth every 6 (six) hours as needed.   Refills: 0     albuterol 108 (90 Base) MCG/ACT Aers  Commonly known as: Ventolin HFA      TAKE 2 PUFFS BY MOUTH EVERY 4 TO 6 HOURS AS NEEDED   Quantity: 6.7 each  Refills: 5     aspirin 81 MG Tbec  Commonly known as: Aspirin Low Dose      Take 1 tablet (81 mg total) by mouth in the morning.   Quantity: 90 tablet  Refills: 3     benzonatate 200 MG Caps  Commonly known as: Tessalon      Take 1 capsule (200 mg total) by mouth 3 (three) times daily as needed for cough.   Quantity: 15 capsule  Refills: 0     carvedilol 25 MG Tabs  Commonly known as: Coreg      Take 1 tablet (25 mg total) by mouth 2 (two) times daily with meals.   Quantity: 180 tablet  Refills: 3     fluticasone-salmeterol 250-50 MCG/ACT Aepb  Commonly known as: Advair Diskus  Ask about: Should I take this medication?      Inhale 1 puff into the lungs 2 (two) times daily.   Stop taking on: Zulema 3, 2025  Quantity: 60 each  Refills: 0     gabapentin 100 MG Caps  Commonly known as: Neurontin      Take 1 capsule (100 mg total) by mouth nightly.   Refills: 0     glipiZIDE 2.5 MG Tabs      Take 2.5 mg by mouth every 12 (twelve) hours.   Quantity: 60 tablet  Refills: 1     hydrALAZINE 25 MG Tabs  Commonly known as: Apresoline  Ask about: Should I take this medication?      Take 1 tablet (25 mg total) by mouth every 8 (eight) hours.   Stop taking on: June 4, 2025  Quantity: 90 tablet  Refills: 0     insulin lispro 100 UNIT/ML Soln  Commonly known as: Humalog      Inject 3 Units into the skin  in the morning and 3 Units at noon and 3 Units in the evening. Inject before meals. Sliding scale depending on blood sugar.   Refills: 0     ipratropium 0.02 % Soln  Commonly known as: Atrovent      Take 2.5 mL (500 mcg total) by nebulization 2 (two) times daily.   Quantity: 187.5 mL  Refills: 2     Lantus SoloStar 100 UNIT/ML Sopn  Generic drug: insulin glargine      Inject 10 Units into the skin every morning.   Refills: 0     lidocaine-menthol 4-1 % Ptch      Place 1 patch onto the skin in the morning.   Refills: 0     losartan 100 MG Tabs  Commonly known as: Cozaar  Ask about: Should I take this medication?      Take 1 tablet (100 mg total) by mouth daily.   Stop taking on: June 4, 2025  Quantity: 30 tablet  Refills: 0     losartan 100 MG Tabs  Commonly known as: Cozaar      Take 1 tablet (100 mg total) by mouth daily.   Quantity: 90 tablet  Refills: 3     magnesium oxide 400 MG Tabs  Commonly known as: Mag-Ox      Take 1 tablet (400 mg total) by mouth 2 (two) times daily. Per Dr. JENSEN Mata   Refills: 0     montelukast 10 MG Tabs  Commonly known as: Singulair      Take 1 tablet (10 mg total) by mouth nightly.   Quantity: 90 tablet  Refills: 3     pantoprazole 40 MG Tbec  Commonly known as: Protonix      Take 1 tablet (40 mg total) by mouth in the morning and 1 tablet (40 mg total) in the evening. Take before meals.   Quantity: 60 tablet  Refills: 0     predniSONE 5 MG Tabs  Commonly known as: Deltasone      Take 1 tablet (5 mg total) by mouth daily.   Quantity: 90 tablet  Refills: 1     predniSONE 20 MG Tabs  Commonly known as: Deltasone  Start taking on: May 5, 2025  Ask about: Should I take this medication?      Take 1 tablet (20 mg total) by mouth daily with breakfast for 2 days, THEN 0.5 tablets (10 mg total) daily with breakfast for 2 days.   Stop taking on: May 9, 2025  Quantity: 3 tablet  Refills: 0     rOPINIRole 0.25 MG Tabs  Commonly known as: Requip      Take 1 tablet (0.25 mg total) by mouth every  evening.   Quantity: 90 tablet  Refills: 1     rosuvastatin 20 MG Tabs  Commonly known as: Crestor      Take 1 tablet (20 mg total) by mouth nightly.   Quantity: 90 tablet  Refills: 3              Follow up Visits: Follow-up with pcp in 1 week    Follow up Labs: none     Other Discharge Instructions: f/u with [facility] end of month     Nelia SMART DO Jana  6/11/2025  9:42 AM    > 35 min        [1]   Patient Active Problem List  Diagnosis    Adult general medical examination    Vaccine counseling    Colon cancer screening    Type 2 diabetes mellitus without retinopathy (HCC)    Sarcoidosis    Anemia, iron deficiency    Dyslipidemia    Hypercalcemia    Hypertension    Infiltrating ductal carcinoma of right breast, stage 1 (HCC)    Rheumatoid arthritis (HCC)    Osteoarthrosis    Peripheral vascular disease due to secondary diabetes mellitus (HCC)    Neuropathy    AVM (arteriovenous malformation) of duodenum, acquired    Pseudophakia of both eyes    Vitreous floaters of both eyes    Glaucoma suspect of both eyes    Iron deficiency anemia due to chronic blood loss    Malabsorption of iron (HCC)    Port-A-Cath in place    Chronic obstructive pulmonary disease, unspecified (HCC)    Adnexal mass    Encounter for care related to vascular access port    High risk medication use    Meibomian gland dysfunction (MGD) of both eyes    Hypokalemia    Hyperkalemia    Hypomagnesemia    Constipation    Gastric AVM    AVM (arteriovenous malformation) of small bowel, acquired with hemorrhage    Leukocytosis    Presence of IVC filter    CKD (chronic kidney disease) stage 3, GFR 30-59 ml/min (HCC)    Dry eye syndrome of both eyes    Vitamin D deficiency    Cystic thyroid nodule    Cough    Chronic fatigue    Cerebellar stroke (HCC)    Dizziness    Right hand paresthesia    Thrombus of aorta (HCC)    Bilateral pulmonary embolism (HCC)    Acute cystitis without hematuria    PE (pulmonary thromboembolism) (HCC)    Aortic thrombus  (HCC)    New cerebellar infarct (HCC)    Small bowel bleed not requiring more than 4 units of blood in 24 hours, ICU, or surgery    Acute blood loss anemia    Cerebrovascular accident (CVA) due to embolism of precerebral artery (HCC)    Melena    Goals of care, counseling/discussion    Palliative care encounter    AVM (arteriovenous malformation) (HCC)    History of embolic stroke    History of DVT (deep vein thrombosis)    Iron deficiency anemia, unspecified    Parkinson's disease (HCC)    Sequela, post-stroke    Daily headache    Restless leg    Acute hypoxic respiratory failure (HCC)    COPD (chronic obstructive pulmonary disease) (HCC)    Community acquired pneumonia, unspecified laterality    Acute on chronic anemia    Claustrophobia    Anemia, unspecified type    Acute pulmonary edema (HCC)    Hypervolemia, unspecified hypervolemia type    Gastrointestinal hemorrhage, unspecified gastrointestinal hemorrhage type    Hypoxemia

## 2025-06-11 NOTE — DISCHARGE INSTRUCTIONS
Home care  [facility]  [address] [place] [address]  Phone: [phone]  Fax: [phone]    Oxygen  [facility]  [address] [place] [address]  Phone: [phone]  Fax: [phone]

## 2025-06-11 NOTE — CM/SW NOTE
06/11/25 1200   Discharge disposition   Expected discharge disposition Home-Health   Post Acute Care Provider [facility]  ([facility])   DME/Infusion Providers [facility]   Discharge transportation Private car     Laly LEBLANCN RN [facility]  RN Case Manager [facility]

## 2025-06-11 NOTE — PROGRESS NOTES
06/11/25 0937   Mobility   O2 walk? Yes   SPO2% on Room Air at Rest 94   SPO2% on Oxygen at Rest 99   At rest oxygen flow (liters per minute) 2   SPO2% Ambulation on Room Air 85   SPO2% Ambulation on Oxygen 94   Ambulation oxygen flow (liters per minute) 2

## 2025-06-11 NOTE — CM/SW NOTE
06/11/25 1200   Discharge disposition   Expected discharge disposition Home-Health   Post Acute Care Provider [facility]  ([facility])   DME/Infusion Providers [facility]     O2 orders and documentation sent to [facility] for processing.    Choice for HH is [facility].  Both DME and HH aware of dc today.    Laly Parker MBA BSN RN [facility]  RN Case Manager [facility]

## 2025-06-11 NOTE — PLAN OF CARE
Problem: Patient Centered Care  Goal: Patient preferences are identified and integrated in the patient's plan of care  Description: Interventions:  - What would you like us to know as we care for you?   - Provide timely, complete, and accurate information to patient/family  - Incorporate patient and family knowledge, values, beliefs, and cultural backgrounds into the planning and delivery of care  - Encourage patient/family to participate in care and decision-making at the level they choose  - Honor patient and family perspectives and choices  Outcome: Adequate for Discharge     Problem: Diabetes/Glucose Control  Goal: Glucose maintained within prescribed range  Description: INTERVENTIONS:  - Monitor Blood Glucose as ordered  - Assess for signs and symptoms of hyperglycemia and hypoglycemia  - Administer ordered medications to maintain glucose within target range  - Assess barriers to adequate nutritional intake and initiate nutrition consult as needed  - Instruct patient on self management of diabetes  Outcome: Adequate for Discharge     Problem: Patient/Family Goals  Goal: Patient/Family Long Term Goal  Description: Patient's Long Term Goal:     Interventions:  - See additional Care Plan goals for specific interventions  Outcome: Adequate for Discharge  Goal: Patient/Family Short Term Goal  Description: Patient's Short Term Goal:     Interventions:   - See additional Care Plan goals for specific interventions  Outcome: Adequate for Discharge     Problem: PAIN - ADULT  Goal: Verbalizes/displays adequate comfort level or patient's stated pain goal  Description: INTERVENTIONS:  - Encourage pt to monitor pain and request assistance  - Assess pain using appropriate pain scale  - Administer analgesics based on type and severity of pain and evaluate response  - Implement non-pharmacological measures as appropriate and evaluate response  - Consider cultural and social influences on pain and pain management  -  Manage/alleviate anxiety  - Utilize distraction and/or relaxation techniques  - Monitor for opioid side effects  - Notify MD/LIP if interventions unsuccessful or patient reports new pain  - Anticipate increased pain with activity and pre-medicate as appropriate  Outcome: Adequate for Discharge     Problem: SAFETY ADULT - FALL  Goal: Free from fall injury  Description: INTERVENTIONS:  - Assess pt frequently for physical needs  - Identify cognitive and physical deficits and behaviors that affect risk of falls.  - [place] fall precautions as indicated by assessment.  - Educate pt/family on patient safety including physical limitations  - Instruct pt to call for assistance with activity based on assessment  - Modify environment to reduce risk of injury  - Provide assistive devices as appropriate  - Consider OT/PT consult to assist with strengthening/mobility  - Encourage toileting schedule  Outcome: Adequate for Discharge     Problem: DISCHARGE PLANNING  Goal: Discharge to home or other facility with appropriate resources  Description: INTERVENTIONS:  - Identify barriers to discharge w/pt and caregiver  - Include patient/family/discharge partner in discharge planning  - Arrange for needed discharge resources and transportation as appropriate  - Identify discharge learning needs (meds, wound care, etc)  - Arrange for interpreters to assist at discharge as needed  - Consider post-discharge preferences of patient/family/discharge partner  - Complete POLST form as appropriate  - Assess patient's ability to be responsible for managing their own health  - Refer to Case Management Department for coordinating discharge planning if the patient needs post-hospital services based on physician/LIP order or complex needs related to functional status, cognitive ability or social support system  Outcome: Adequate for Discharge     Problem: CARDIOVASCULAR - ADULT  Goal: Maintains optimal cardiac output and hemodynamic  stability  Description: INTERVENTIONS:  - Monitor vital signs, rhythm, and trends  - Monitor for bleeding, hypotension and signs of decreased cardiac output  - Evaluate effectiveness of vasoactive medications to optimize hemodynamic stability  - Monitor arterial and/or venous puncture sites for bleeding and/or hematoma  - Assess quality of pulses, skin color and temperature  - Assess for signs of decreased coronary artery perfusion - ex. Angina  - Evaluate fluid balance, assess for edema, trend weights  Outcome: Adequate for Discharge  Goal: Absence of cardiac arrhythmias or at baseline  Description: INTERVENTIONS:  - Continuous cardiac monitoring, monitor vital signs, obtain 12 lead EKG if indicated  - Evaluate effectiveness of antiarrhythmic and heart rate control medications as ordered  - Initiate emergency measures for life threatening arrhythmias  - Monitor electrolytes and administer replacement therapy as ordered  Outcome: Adequate for Discharge     Problem: RESPIRATORY - ADULT  Goal: Achieves optimal ventilation and oxygenation  Description: INTERVENTIONS:  - Assess for changes in respiratory status  - Assess for changes in mentation and behavior  - Position to facilitate oxygenation and minimize respiratory effort  - Oxygen supplementation based on oxygen saturation or ABGs  - Provide Smoking Cessation handout, if applicable  - Encourage broncho-pulmonary hygiene including cough, deep breathe, Incentive Spirometry  - Assess the need for suctioning and perform as needed  - Assess and instruct to report SOB or any respiratory difficulty  - Respiratory Therapy support as indicated  - Manage/alleviate anxiety  - Monitor for signs/symptoms of CO2 retention  Outcome: Adequate for Discharge

## 2025-06-11 NOTE — PROGRESS NOTES
Pulmonary Medicine Inpatient Progress Note                 Subjective:  On RA  Afebrile  Feels well and ready to go home        ALLERGIES:  Allergies[1]     MEDS:  Home Medications:  Medications Taking[2]  Scheduled Medication:  Scheduled Medications[3]  Continuous Infusing Medication:  Medication Infusions[4]  PRN Medications:  PRN Medications[5]       PHYSICAL EXAM:  /80 (BP Location: Right arm)   Pulse 75   Temp 98.2 °F (36.8 °C) (Oral)   Resp 18   Ht 5' 1\" (1.549 m)   Wt 132 lb 11.2 oz (60.2 kg)   SpO2 92%   BMI 25.07 kg/m²   CONSTITUTIONAL: alert, oriented, no apparent distress  HEENT: atraumatic normocephalic  MOUTH: mucous membranes are moist. No OP exudates  NECK/THROAT: no JVD. Trachea midline. No obvious thyromegaly  LUNG: no wheezing, + faint basilar crackles. Chest symmetric with respiratory motion  HEART: regular rate and rhythm, no obvious murmers or gallops note  ABD: soft non tender. + bowel sounds. No organomegaly noted  EXT: no clubbing, cyanosis, or edema noted       IMAGES:  CXR 6/8/25  1. Bilateral patchy opacities without a significant change.  Differential includes infection, edema and fibrosis.       LABS:  Recent Labs   Lab 06/09/25  0505 06/09/25  1248 06/10/25  0511 06/11/25  0436   RBC 2.91*  --  2.87* 2.80*   HGB 8.0* 7.8* 7.9* 7.7*   HCT 26.6*  --  25.6* 26.0*   MCV 91.4  --  89.2 92.9   MCH 27.5  --  27.5 27.5   MCHC 30.1*  --  30.9* 29.6*   RDW 19.0*  --  18.9* 18.9*   NEPRELIM 9.44*  --  10.14* 9.19*   WBC 10.5  --  11.5* 10.3   .0  --  280.0 289.0       Recent Labs   Lab 06/08/25  1330 06/08/25  2110 06/09/25  0505 06/10/25  0511 06/11/25  0436   *  --  220* 180* 178*   BUN 13  --  14 16 15   CREATSERUM 0.97  --  1.02 1.06* 1.09*   EGFRCR 57*  --  54* 51* 49*   CA 8.3*  --  8.6* 8.9 8.9   ALB 3.4  --  3.5  --   --      --  142 140 140   K 2.9*   < > 4.6  4.6 4.1 3.8     --  107 104 103   CO2 28.0  --  29.0 30.0 30.0   ALKPHO 60  --  67  --    --    AST 24  --  48*  --   --    ALT 14  --  25  --   --    BILT 0.4  --  0.4  --   --    TP 6.2  --  6.4  --   --     < > = values in this interval not displayed.          ASSESSMENT/PLAN:  Acute hypoxemic resp failure  -weaned off 02. Needs 2 LNC supp 02 with ambulation    COPD  -on advair (home med per pt as well)  -outpt f/u with Dr. Parker     HFpEF  -diuresis as needed    Anemia  -transfuse for hg < 7     Dispo  -full code  -ok for discharge. F/u with Dr. Parker in pulm clinic in 2-3 weeks  Thank you for the opportunity to care for Chester Bautista.     CHAU Roque DO, MPH  Pulmonary Critical Care Medicine  [facility]          [1]   Allergies  Allergen Reactions    Celebrex [Celecoxib] PALPITATIONS    Penicillins ITCHING and SWELLING    Amlodipine OTHER (SEE COMMENTS)     Calves Swelling     Metformin And Related UNKNOWN    Olmesartan UNKNOWN   [2]   Current Facility-Administered Medications for the 6/8/25 encounter (Hospital Encounter)   Medication Dose Route Frequency Provider Last Rate Last Admin    Certolizumab Pegol  mg  200 mg Subcutaneous Q28 Days Ashia Goodrich MD   200 mg at 05/29/25 1230     Outpatient Medications Marked as Taking for the 6/8/25 encounter (Hospital Encounter)   Medication Sig Dispense Refill    FLUTICASONE-SALMETEROL 250-50 MCG/ACT Inhalation Aerosol Powder, Breath Activated Inhale 1 puff into the lungs 2 (two) times daily. 60 each 0    hydrALAZINE 25 MG Oral Tab Take 1 tablet (25 mg total) by mouth every 8 (eight) hours. 90 tablet 0    losartan 100 MG Oral Tab Take 1 tablet (100 mg total) by mouth daily. 90 tablet 3    rOPINIRole 0.25 MG Oral Tab Take 1 tablet (0.25 mg total) by mouth every evening. 90 tablet 1    carvedilol 25 MG Oral Tab Take 1 tablet (25 mg total) by mouth 2 (two) times daily with meals. 180 tablet 3    glipiZIDE 2.5 MG Oral Tab Take 2.5 mg by mouth every 12 (twelve) hours. 60 tablet 1    aspirin  (ASPIRIN LOW DOSE) 81 MG Oral Tab EC Take 1 tablet (81 mg total) by mouth in the morning. 90 tablet 3    pantoprazole 40 MG Oral Tab EC Take 1 tablet (40 mg total) by mouth in the morning and 1 tablet (40 mg total) in the evening. Take before meals. 60 tablet 0    LANTUS SOLOSTAR 100 UNIT/ML Subcutaneous Solution Pen-injector Inject 10 Units into the skin every morning.      benzonatate 200 MG Oral Cap Take 1 capsule (200 mg total) by mouth 3 (three) times daily as needed for cough. 15 capsule 0    gabapentin 100 MG Oral Cap Take 1 capsule (100 mg total) by mouth nightly.      insulin lispro 100 UNIT/ML Injection Solution Inject 3 Units into the skin in the morning and 3 Units at noon and 3 Units in the evening. Inject before meals. Sliding scale depending on blood sugar.      ipratropium 0.02 % Inhalation Solution Take 2.5 mL (500 mcg total) by nebulization 2 (two) times daily. 187.5 mL 2    acetaminophen 325 MG Oral Tab Take 2 tablets (650 mg total) by mouth every 6 (six) hours as needed.      rosuvastatin 20 MG Oral Tab Take 1 tablet (20 mg total) by mouth nightly. 90 tablet 3    magnesium oxide 400 MG Oral Tab Take 1 tablet (400 mg total) by mouth 2 (two) times daily. Per Dr. JENSEN Mata      predniSONE 5 MG Oral Tab Take 1 tablet (5 mg total) by mouth daily. 90 tablet 1    albuterol 108 (90 Base) MCG/ACT Inhalation Aero Soln TAKE 2 PUFFS BY MOUTH EVERY 4 TO 6 HOURS AS NEEDED 6.7 each 5    montelukast 10 MG Oral Tab Take 1 tablet (10 mg total) by mouth nightly. 90 tablet 3   [3]    predniSONE  5 mg Oral Daily with breakfast    rosuvastatin  10 mg Oral Nightly    hydrALAZINE  50 mg Oral Q8H MARGE    insulin aspart  5 Units Subcutaneous TID CC    insulin degludec  13 Units Subcutaneous Daily    sodium ferric gluconate  125 mg Intravenous Daily    carvedilol  25 mg Oral BID with meals    fluticasone-salmeterol  1 puff Inhalation BID    gabapentin  100 mg Oral Nightly    lidocaine-menthol  1 patch Transdermal Daily     magnesium oxide  400 mg Oral BID    montelukast  10 mg Oral Nightly    rOPINIRole  0.25 mg Oral QPM    pantoprazole  40 mg Intravenous Q12H    insulin aspart  1-11 Units Subcutaneous TID CC   [4] [5]   albuterol    benzonatate    ipratropium    acetaminophen    acetaminophen **OR** HYDROcodone-acetaminophen **OR** HYDROcodone-acetaminophen    melatonin    polyethylene glycol (PEG 3350)    sennosides    bisacodyl    fleet enema    ondansetron    metoclopramide    sodium chloride    glycerin-hypromellose-    guaiFENesin    hydrALAzine    glucose **OR** glucose **OR** glucose-vitamin C **OR** dextrose **OR** glucose **OR** glucose **OR** glucose-vitamin C

## 2025-06-11 NOTE — PLAN OF CARE
Problem: Diabetes/Glucose Control  Goal: Glucose maintained within prescribed range  Description: INTERVENTIONS:  - Monitor Blood Glucose as ordered  - Assess for signs and symptoms of hyperglycemia and hypoglycemia  - Administer ordered medications to maintain glucose within target range  - Assess barriers to adequate nutritional intake and initiate nutrition consult as needed  - Instruct patient on self management of diabetes  Outcome: Progressing     Problem: SAFETY ADULT - FALL  Goal: Free from fall injury  Description: INTERVENTIONS:  - Assess pt frequently for physical needs  - Identify cognitive and physical deficits and behaviors that affect risk of falls.  - [place] fall precautions as indicated by assessment.  - Educate pt/family on patient safety including physical limitations  - Instruct pt to call for assistance with activity based on assessment  - Modify environment to reduce risk of injury  - Provide assistive devices as appropriate  - Consider OT/PT consult to assist with strengthening/mobility  - Encourage toileting schedule  Outcome: Progressing     Problem: DISCHARGE PLANNING  Goal: Discharge to home or other facility with appropriate resources  Description: INTERVENTIONS:  - Identify barriers to discharge w/pt and caregiver  - Include patient/family/discharge partner in discharge planning  - Arrange for needed discharge resources and transportation as appropriate  - Identify discharge learning needs (meds, wound care, etc)  - Arrange for interpreters to assist at discharge as needed  - Consider post-discharge preferences of patient/family/discharge partner  - Complete POLST form as appropriate  - Assess patient's ability to be responsible for managing their own health  - Refer to Case Management Department for coordinating discharge planning if the patient needs post-hospital services based on physician/LIP order or complex needs related to functional status, cognitive ability or social  support system  Outcome: Progressing     Problem: PAIN - ADULT  Goal: Verbalizes/displays adequate comfort level or patient's stated pain goal  Description: INTERVENTIONS:  - Encourage pt to monitor pain and request assistance  - Assess pain using appropriate pain scale  - Administer analgesics based on type and severity of pain and evaluate response  - Implement non-pharmacological measures as appropriate and evaluate response  - Consider cultural and social influences on pain and pain management  - Manage/alleviate anxiety  - Utilize distraction and/or relaxation techniques  - Monitor for opioid side effects  - Notify MD/LIP if interventions unsuccessful or patient reports new pain  - Anticipate increased pain with activity and pre-medicate as appropriate  Outcome: Not Progressing     Problem: CARDIOVASCULAR - ADULT  Goal: Maintains optimal cardiac output and hemodynamic stability  Description: INTERVENTIONS:  - Monitor vital signs, rhythm, and trends  - Monitor for bleeding, hypotension and signs of decreased cardiac output  - Evaluate effectiveness of vasoactive medications to optimize hemodynamic stability  - Monitor arterial and/or venous puncture sites for bleeding and/or hematoma  - Assess quality of pulses, skin color and temperature  - Assess for signs of decreased coronary artery perfusion - ex. Angina  - Evaluate fluid balance, assess for edema, trend weights  Outcome: Not Progressing  Goal: Absence of cardiac arrhythmias or at baseline  Description: INTERVENTIONS:  - Continuous cardiac monitoring, monitor vital signs, obtain 12 lead EKG if indicated  - Evaluate effectiveness of antiarrhythmic and heart rate control medications as ordered  - Initiate emergency measures for life threatening arrhythmias  - Monitor electrolytes and administer replacement therapy as ordered  Outcome: Not Progressing     Problem: RESPIRATORY - ADULT  Goal: Achieves optimal ventilation and oxygenation  Description:  INTERVENTIONS:  - Assess for changes in respiratory status  - Assess for changes in mentation and behavior  - Position to facilitate oxygenation and minimize respiratory effort  - Oxygen supplementation based on oxygen saturation or ABGs  - Provide Smoking Cessation handout, if applicable  - Encourage broncho-pulmonary hygiene including cough, deep breathe, Incentive Spirometry  - Assess the need for suctioning and perform as needed  - Assess and instruct to report SOB or any respiratory difficulty  - Respiratory Therapy support as indicated  - Manage/alleviate anxiety  - Monitor for signs/symptoms of CO2 retention  Outcome: Not Progressing

## 2025-06-11 NOTE — PLAN OF CARE
Problem: Patient Centered Care  Goal: Patient preferences are identified and integrated in the patient's plan of care  Description: Interventions:  - What would you like us to know as we care for you? From home with family  - Provide timely, complete, and accurate information to patient/family  - Incorporate patient and family knowledge, values, beliefs, and cultural backgrounds into the planning and delivery of care  - Encourage patient/family to participate in care and decision-making at the level they choose  - Honor patient and family perspectives and choices  Outcome: Progressing     Problem: Diabetes/Glucose Control  Goal: Glucose maintained within prescribed range  Description: INTERVENTIONS:  - Monitor Blood Glucose as ordered  - Assess for signs and symptoms of hyperglycemia and hypoglycemia  - Administer ordered medications to maintain glucose within target range  - Assess barriers to adequate nutritional intake and initiate nutrition consult as needed  - Instruct patient on self management of diabetes  Outcome: Progressing     Problem: Patient/Family Goals  Goal: Patient/Family Long Term Goal  Description: Patient's Long Term Goal: discharge from hospital    Interventions:  -Monitor VS  -Monitor Appropriate labs  -Follow MD orders  -Diagnostics per order  -Update/informing patient and family on plan of care  -Discharge planning   - See additional Care Plan goals for specific interventions  Outcome: Progressing  Goal: Patient/Family Short Term Goal  Description: Patient's Short Term Goal: feel better     Interventions:   -Monitor VS  -Monitor Appropriate labs  -Follow MD orders  -Diagnostics per order  -Update/informing patient and family on plan of care  -Discharge planning   - See additional Care Plan goals for specific interventions  Outcome: Progressing     Problem: PAIN - ADULT  Goal: Verbalizes/displays adequate comfort level or patient's stated pain goal  Description: INTERVENTIONS:  - Encourage  pt to monitor pain and request assistance  - Assess pain using appropriate pain scale  - Administer analgesics based on type and severity of pain and evaluate response  - Implement non-pharmacological measures as appropriate and evaluate response  - Consider cultural and social influences on pain and pain management  - Manage/alleviate anxiety  - Utilize distraction and/or relaxation techniques  - Monitor for opioid side effects  - Notify MD/LIP if interventions unsuccessful or patient reports new pain  - Anticipate increased pain with activity and pre-medicate as appropriate  Outcome: Progressing     Problem: SAFETY ADULT - FALL  Goal: Free from fall injury  Description: INTERVENTIONS:  - Assess pt frequently for physical needs  - Identify cognitive and physical deficits and behaviors that affect risk of falls.  - [place] fall precautions as indicated by assessment.  - Educate pt/family on patient safety including physical limitations  - Instruct pt to call for assistance with activity based on assessment  - Modify environment to reduce risk of injury  - Provide assistive devices as appropriate  - Consider OT/PT consult to assist with strengthening/mobility  - Encourage toileting schedule  Outcome: Progressing     Problem: DISCHARGE PLANNING  Goal: Discharge to home or other facility with appropriate resources  Description: INTERVENTIONS:  - Identify barriers to discharge w/pt and caregiver  - Include patient/family/discharge partner in discharge planning  - Arrange for needed discharge resources and transportation as appropriate  - Identify discharge learning needs (meds, wound care, etc)  - Arrange for interpreters to assist at discharge as needed  - Consider post-discharge preferences of patient/family/discharge partner  - Complete POLST form as appropriate  - Assess patient's ability to be responsible for managing their own health  - Refer to Case Management Department for coordinating discharge planning if  the patient needs post-hospital services based on physician/LIP order or complex needs related to functional status, cognitive ability or social support system  Outcome: Progressing     Problem: CARDIOVASCULAR - ADULT  Goal: Maintains optimal cardiac output and hemodynamic stability  Description: INTERVENTIONS:  - Monitor vital signs, rhythm, and trends  - Monitor for bleeding, hypotension and signs of decreased cardiac output  - Evaluate effectiveness of vasoactive medications to optimize hemodynamic stability  - Monitor arterial and/or venous puncture sites for bleeding and/or hematoma  - Assess quality of pulses, skin color and temperature  - Assess for signs of decreased coronary artery perfusion - ex. Angina  - Evaluate fluid balance, assess for edema, trend weights  Outcome: Progressing  Goal: Absence of cardiac arrhythmias or at baseline  Description: INTERVENTIONS:  - Continuous cardiac monitoring, monitor vital signs, obtain 12 lead EKG if indicated  - Evaluate effectiveness of antiarrhythmic and heart rate control medications as ordered  - Initiate emergency measures for life threatening arrhythmias  - Monitor electrolytes and administer replacement therapy as ordered  Outcome: Progressing     Problem: RESPIRATORY - ADULT  Goal: Achieves optimal ventilation and oxygenation  Description: INTERVENTIONS:  - Assess for changes in respiratory status  - Assess for changes in mentation and behavior  - Position to facilitate oxygenation and minimize respiratory effort  - Oxygen supplementation based on oxygen saturation or ABGs  - Provide Smoking Cessation handout, if applicable  - Encourage broncho-pulmonary hygiene including cough, deep breathe, Incentive Spirometry  - Assess the need for suctioning and perform as needed  - Assess and instruct to report SOB or any respiratory difficulty  - Respiratory Therapy support as indicated  - Manage/alleviate anxiety  - Monitor for signs/symptoms of CO2  retention  Outcome: Progressing

## 2025-06-12 ENCOUNTER — PATIENT OUTREACH (OUTPATIENT)
Age: 85
End: 2025-06-12

## 2025-06-12 DIAGNOSIS — K31.819 GASTRIC AVM: ICD-10-CM

## 2025-06-12 DIAGNOSIS — D50.0 IRON DEFICIENCY ANEMIA DUE TO CHRONIC BLOOD LOSS: Primary | ICD-10-CM

## 2025-06-12 LAB
BLOOD TYPE BARCODE: 7300
UNIT VOLUME: 350 ML

## 2025-06-12 NOTE — PROGRESS NOTES
NCM s/w patient's daughter Brea who stated that  RN was there right now and to call back later. NCM will call back later.

## 2025-06-13 ENCOUNTER — OFFICE VISIT (OUTPATIENT)
Age: 85
End: 2025-06-13
Attending: INTERNAL MEDICINE
Payer: MEDICARE

## 2025-06-13 ENCOUNTER — NURSE ONLY (OUTPATIENT)
Age: 85
End: 2025-06-13
Attending: INTERNAL MEDICINE
Payer: MEDICARE

## 2025-06-13 VITALS
SYSTOLIC BLOOD PRESSURE: 166 MMHG | BODY MASS INDEX: 26.24 KG/M2 | RESPIRATION RATE: 18 BRPM | HEIGHT: 61 IN | DIASTOLIC BLOOD PRESSURE: 76 MMHG | OXYGEN SATURATION: 93 % | TEMPERATURE: 98 F | HEART RATE: 76 BPM | WEIGHT: 139 LBS

## 2025-06-13 DIAGNOSIS — K90.9 MALABSORPTION OF IRON (HCC): ICD-10-CM

## 2025-06-13 DIAGNOSIS — K92.1 MELENA: ICD-10-CM

## 2025-06-13 DIAGNOSIS — E55.9 VITAMIN D DEFICIENCY: ICD-10-CM

## 2025-06-13 DIAGNOSIS — Q27.30 AVM (ARTERIOVENOUS MALFORMATION) (HCC): ICD-10-CM

## 2025-06-13 DIAGNOSIS — I10 PRIMARY HYPERTENSION: Primary | ICD-10-CM

## 2025-06-13 DIAGNOSIS — R53.82 CHRONIC FATIGUE: ICD-10-CM

## 2025-06-13 DIAGNOSIS — D62 ACUTE BLOOD LOSS ANEMIA: ICD-10-CM

## 2025-06-13 DIAGNOSIS — K31.819 GASTRIC AVM: ICD-10-CM

## 2025-06-13 DIAGNOSIS — K55.21 AVM (ARTERIOVENOUS MALFORMATION) OF SMALL BOWEL, ACQUIRED WITH HEMORRHAGE: ICD-10-CM

## 2025-06-13 DIAGNOSIS — D50.9 IRON DEFICIENCY ANEMIA, UNSPECIFIED IRON DEFICIENCY ANEMIA TYPE: Primary | ICD-10-CM

## 2025-06-13 DIAGNOSIS — D50.0 IRON DEFICIENCY ANEMIA DUE TO CHRONIC BLOOD LOSS: ICD-10-CM

## 2025-06-13 DIAGNOSIS — K92.2 GASTROINTESTINAL HEMORRHAGE, UNSPECIFIED GASTROINTESTINAL HEMORRHAGE TYPE: ICD-10-CM

## 2025-06-13 LAB
ANTIBODY SCREEN: NEGATIVE
BASOPHILS # BLD AUTO: 0.05 X10(3) UL (ref 0–0.2)
BASOPHILS NFR BLD AUTO: 0.4 %
DEPRECATED HBV CORE AB SER IA-ACNC: 214 NG/ML (ref 50–306)
DEPRECATED RDW RBC AUTO: 64.1 FL (ref 35.1–46.3)
EOSINOPHIL # BLD AUTO: 0.18 X10(3) UL (ref 0–0.7)
EOSINOPHIL NFR BLD AUTO: 1.4 %
ERYTHROCYTE [DISTWIDTH] IN BLOOD BY AUTOMATED COUNT: 19.7 % (ref 11–15)
HCT VFR BLD AUTO: 29.5 % (ref 35–48)
HGB BLD-MCNC: 8.8 G/DL (ref 12–16)
IMM GRANULOCYTES # BLD AUTO: 0.06 X10(3) UL (ref 0–1)
IMM GRANULOCYTES NFR BLD: 0.5 %
IRON SATN MFR SERPL: 15 % (ref 15–50)
IRON SERPL-MCNC: 41 UG/DL (ref 50–170)
LYMPHOCYTES # BLD AUTO: 0.7 X10(3) UL (ref 1–4)
LYMPHOCYTES NFR BLD AUTO: 5.6 %
MCH RBC QN AUTO: 28.1 PG (ref 26–34)
MCHC RBC AUTO-ENTMCNC: 29.8 G/DL (ref 31–37)
MCV RBC AUTO: 94.2 FL (ref 80–100)
MONOCYTES # BLD AUTO: 0.68 X10(3) UL (ref 0.1–1)
MONOCYTES NFR BLD AUTO: 5.4 %
NEUTROPHILS # BLD AUTO: 10.84 X10 (3) UL (ref 1.5–7.7)
NEUTROPHILS # BLD AUTO: 10.84 X10(3) UL (ref 1.5–7.7)
NEUTROPHILS NFR BLD AUTO: 86.7 %
PLATELET # BLD AUTO: 268 10(3)UL (ref 150–450)
RBC # BLD AUTO: 3.13 X10(6)UL (ref 3.8–5.3)
RH BLOOD TYPE: POSITIVE
TOTAL IRON BINDING CAPACITY: 282 UG/DL (ref 250–425)
TRANSFERRIN SERPL-MCNC: 205 MG/DL (ref 250–380)
VIT D+METAB SERPL-MCNC: 16.5 NG/ML (ref 30–100)
WBC # BLD AUTO: 12.5 X10(3) UL (ref 4–11)

## 2025-06-13 RX ORDER — OCTREOTIDE ACETATE 20 MG
20 KIT INTRAMUSCULAR ONCE
Status: COMPLETED | OUTPATIENT
Start: 2025-06-13 | End: 2025-06-13

## 2025-06-13 RX ORDER — OCTREOTIDE ACETATE 20 MG
20 KIT INTRAMUSCULAR ONCE
OUTPATIENT
Start: 2025-07-04

## 2025-06-13 RX ADMIN — OCTREOTIDE ACETATE 20 MG: 20 MG KIT INTRAMUSCULAR at 15:01:00

## 2025-06-13 NOTE — PROGRESS NOTES
NCM attempted to contact the patient for DESI. Phone rang once and disconnected. NCM will try again another time.

## 2025-06-13 NOTE — PROGRESS NOTES
[facility] Progress Note    Patient Name: Chester Bautista   YOB: 1940   Medical Record Number: [id number]   [facility] [id number]  Date: 6/13/25    Chief Complaint: Recent inpatient visit for GI bleed, melena, anemia, hypoemia with history of BUZZ, hx of PE and AVM      Oncology History:  85 year old with chronic iron deficiency anemia in the setting of bleeding AVMs s/p repeat endoscopic treatment, Iv iron treatment and recent RBC transfusion for hgb of 6 on 5/2022. EGD/CLN Dr Garcia 7/2022 noted for 2 AVM in duodenum and 1 AVM in the colon s/p treatment.  There is ongoing concern for more AVMs as well need for chronic monitoring, infusions etc.      History is noted for RA managed by Dr Goodrich, hepatitis B core antibody positivity, and remote history of breast cancer (records not available).     Pt had an infusion 2/2023 injectafer.   5/19/23: reported to ED due to rectal bleeding and abd pain, has CLN planned.    In early 2/24 rec for ER eval in light of the critically HTN. She was treated for pneumonia. Prudencio was then found to have PE and GI bleeding. She was treated with IV heparin and required cauterization twice for GI bleeding, so never started on Apixaban and held from anticoagulation.    Received IV iron in 4/24  Prudencio underwent bidirectional scoping with Dr. Aundrea Garcia in GI in June 2023 were several areas of AVM were cauterized.     Getting monthly octreotide with Dr. Garcia    She last completed IV iron 3 months ago 10/9-10/31/24 (Venofer)  Last IV iron venofer 5/23 and 5/30/25 6/13/25  Interim  Patient here with daughter Brea, she presents for f/u  post hospital visit.  6/8-6/11/25. She was admitted with SOB and melena, GI bleed , anemia, hypoxemia. HGB 6.5, received 1 unit RBC transfusion, and IV iron.  Was on medical mgmt with octreotide monthly, now scheduled with [facility] to discuss balloon enteroscopy.    Continues with fatigue, denies dizziness, worsening sob, pica  for ice. She reports last dark stool on 6/11/25 and today it is yellow stool. Denies prbpr today. She denies bruising, denies blood loss from any orifice that she can see. Denies numbness and tingling.     Follow rheumatology Dr. Goodrich on steroid taper, planning for cimzia injection.      Past Medical History: history of breast cancer, BUZZ, bleeding AVM, diabetes, HTN, HLD, RA  Past Surgical History: Hysterectomy 1972, IR aneurysm repair, right lumpectomy, knee replacements  Family History: neg for malignancy  Social History: lives with naif, able to do all ADLs, no smoking or ETOH    Current Outpatient Medications:     FLUTICASONE-SALMETEROL 250-50 MCG/ACT Inhalation Aerosol Powder, Breath Activated, Inhale 1 puff into the lungs 2 (two) times daily., Disp: 60 each, Rfl: 0    hydrALAZINE 25 MG Oral Tab, Take 1 tablet (25 mg total) by mouth every 8 (eight) hours., Disp: 90 tablet, Rfl: 0    losartan 100 MG Oral Tab, Take 1 tablet (100 mg total) by mouth daily., Disp: 90 tablet, Rfl: 3    rOPINIRole 0.25 MG Oral Tab, Take 1 tablet (0.25 mg total) by mouth every evening., Disp: 90 tablet, Rfl: 1    carvedilol 25 MG Oral Tab, Take 1 tablet (25 mg total) by mouth 2 (two) times daily with meals., Disp: 180 tablet, Rfl: 3    glipiZIDE 2.5 MG Oral Tab, Take 2.5 mg by mouth every 12 (twelve) hours., Disp: 60 tablet, Rfl: 1    pantoprazole 40 MG Oral Tab EC, Take 1 tablet (40 mg total) by mouth in the morning and 1 tablet (40 mg total) in the evening. Take before meals., Disp: 60 tablet, Rfl: 0    LANTUS SOLOSTAR 100 UNIT/ML Subcutaneous Solution Pen-injector, Inject 10 Units into the skin every morning., Disp: , Rfl:     benzonatate 200 MG Oral Cap, Take 1 capsule (200 mg total) by mouth 3 (three) times daily as needed for cough., Disp: 15 capsule, Rfl: 0    gabapentin 100 MG Oral Cap, Take 1 capsule (100 mg total) by mouth nightly., Disp: , Rfl:     insulin lispro 100 UNIT/ML Injection Solution, Inject 3 Units into the  skin in the morning and 3 Units at noon and 3 Units in the evening. Inject before meals. Sliding scale depending on blood sugar., Disp: , Rfl:     ipratropium 0.02 % Inhalation Solution, Take 2.5 mL (500 mcg total) by nebulization 2 (two) times daily., Disp: 187.5 mL, Rfl: 2    lidocaine-menthol 4-1 % External Patch, Place 1 patch onto the skin in the morning., Disp: , Rfl:     acetaminophen 325 MG Oral Tab, Take 2 tablets (650 mg total) by mouth every 6 (six) hours as needed., Disp: , Rfl:     rosuvastatin 20 MG Oral Tab, Take 1 tablet (20 mg total) by mouth nightly., Disp: 90 tablet, Rfl: 3    magnesium oxide 400 MG Oral Tab, Take 1 tablet (400 mg total) by mouth 2 (two) times daily. Per Dr. JENSEN Mata, Disp: , Rfl:     predniSONE 5 MG Oral Tab, Take 1 tablet (5 mg total) by mouth daily., Disp: 90 tablet, Rfl: 1    albuterol 108 (90 Base) MCG/ACT Inhalation Aero Soln, TAKE 2 PUFFS BY MOUTH EVERY 4 TO 6 HOURS AS NEEDED, Disp: 6.7 each, Rfl: 5    BD PEN NEEDLE KADEN 2ND GEN 32G X 4 MM Does not apply Misc, 1 EACH DAILY. USE DAILY WITH INSULIN, Disp: 100 each, Rfl: 1    montelukast 10 MG Oral Tab, Take 1 tablet (10 mg total) by mouth nightly., Disp: 90 tablet, Rfl: 3    Blood Glucose Monitoring Suppl (BLOOD GLUCOSE SYSTEM GILLES) Does not apply Kit, Dx. E11.9. Checks qam., Disp: 1 kit, Rfl: 0    Blood Glucose Monitoring Suppl w/Device Does not apply Kit, Dx. E11.9. Checks qam., Disp: 1 kit, Rfl: 0    Blood Gluc Meter Disp-Strips Does not apply Device, Dx. E11.9. Checks qam., Disp: 100 each, Rfl: 0    Accu-Chek FastClix Lancets Does not apply Misc, Check sugar once daily as directed (E11.42), Disp: 100 each, Rfl: 2    Blood Glucose Monitoring Suppl (ONETOUCH VERIO) w/Device Does not apply Kit, 1 each daily. Test 1x daily, Disp: 1 kit, Rfl: 0    OneTouch Delica Lancets 33G Does not apply Misc, 4 x daily, Disp: 100 each, Rfl: 1    Allergies:  Allergies   Allergen Reactions    Celebrex [Celecoxib] PALPITATIONS    Penicillins  ITCHING and SWELLING    Amlodipine OTHER (SEE COMMENTS)     Calves Swelling     Metformin And Related UNKNOWN    Olmesartan UNKNOWN        Review of Systems:Oncology specific ROS negative except as per HPI    BP (!) 166/76 (BP Location: Left arm, Patient Position: Sitting, Cuff Size: adult)   Pulse 76   Temp 98.3 °F (36.8 °C) (Tympanic)   Resp 18   Ht 1.549 m (5' 1\")   Wt 63 kg (139 lb)   SpO2 93%   BMI 26.26 kg/m²   Wt Readings from Last 6 Encounters:   06/13/25 63 kg (139 lb)   06/11/25 60.2 kg (132 lb 11.2 oz)   05/30/25 62.1 kg (137 lb)   05/29/25 62.3 kg (137 lb 6.4 oz)   05/23/25 63.5 kg (140 lb)   05/21/25 64 kg (141 lb)     PE  General: Patient is alert and oriented x 3, not in acute distress. Steady gait.  HEENT: EOMI, MMM, No LAD russell cervical or supraclavicular, neck supple  Chest: Clear BS to auscultation bilaterally, no wob  Abdomen: soft, non tender, non distended, +BSx4  Extremities: scant left LE edema, no edema RLE, warm and brown skin normal for ethnicity  Neurological: Strength is grossly intact. Moving all extremities. Gait appropriate.   Psych/Depression: Appropriate mood and affect            Lab Results   Component Value Date    WBC 12.5 (H) 06/13/2025    RBC 3.13 (L) 06/13/2025    HGB 8.8 (L) 06/13/2025    HCT 29.5 (L) 06/13/2025    MCV 94.2 06/13/2025    MCH 28.1 06/13/2025    MCHC 29.8 (L) 06/13/2025    RDW 19.7 (H) 06/13/2025    .0 06/13/2025     Lab Results   Component Value Date     (H) 06/11/2025    BUN 15 06/11/2025    BUNCREA 13.8 06/11/2025    CREATSERUM 1.09 (H) 06/11/2025    ANIONGAP 7 06/11/2025    GFRNAA 47 (L) 05/02/2022    GFRAA 54 (L) 05/02/2022    CA 8.9 06/11/2025    OSMOCALC 295 06/11/2025    ALKPHO 67 06/09/2025    AST 48 (H) 06/09/2025    ALT 25 06/09/2025    BILT 0.4 06/09/2025    TP 6.4 06/09/2025    ALB 3.5 06/09/2025    GLOBULIN 2.9 06/09/2025     06/11/2025    K 3.8 06/11/2025     06/11/2025    CO2 30.0 06/11/2025       Lab Results    Component Value Date    ROSALIE 214 06/13/2025     Lab Results   Component Value Date    IRON 41 (L) 06/13/2025     Lab Results   Component Value Date    IRON 41 (L) 06/13/2025    TRANSFERRIN 205 (L) 06/13/2025    TIBCP 282 06/13/2025    SAT 15 06/13/2025       Impression and Plan:  85 year old with chronic iron deficiency anemia in the setting of bleeding AVMs s/p repeat endoscopic treatment, Iv iron treatment and rbc tfx 5/2022,    1.) BUZZ from AVM's    - previously CAPUTO has been a hallmark for symptomatic anemia for her, also has worsening fatigue and some lightheadedness  --pt received venofer in 4/24   --pt has improved symptoms with monthly octreotide by Dr. Garcia in GI for continued blood loss;   -11/22/24 Last IV iron 10/9-10/31/24 (Venofer)  -completed IV iron 5/23 and 5/30/25 Venofer    -6/8-6/11/25 she was admitted with SOB and melena, GI bleed , anemia, hypoxemia. HGB 6.5, received 1 unit RBC transfusion, and IV iron.  -cont medical mgmt with octreotide monthly received today, -scheduled with [facility] to discuss balloon enteroscopy 6/25/25.  -Patient reports intermittent dark stool last 6/11/25, not endorsed tody.   -HGB stable at 8.8 today  -goal is to keep Prudencio's iron saturation over 25% as well as ferritin in normal range and over 100 would be great;   -cont labs (CBC) weekly next 6/18/25  -follow up in 2 weeks 6/27/25 Dr. Mohamud, with iron studies (ferritin, iron tibc, cbc)      2.) HTN  --better control, cont to f/u with her PCP  --improving    3) History of PE  --found on 2/23/24; not on anticoagulation in light of recent GI bleeding   --IVC filter placed on 3/7/24 by IR; she was rec for IVC filter retreival in 3-6 mo by IR, so this was removed  in 6/24  --her PE was noted to be resolved by CT chest on 4/26      4.) COPD/pulm fibrosis  -recent hospitalization and pneumonia 10/22/24, now resolved  -followed by Dr. Parker, now has nebulizer for albuterol      5.) UTI  -RESOLVED    6.) RA  Follow  rheumatology Dr. Goodrich on steroid taper, planning for cimzia injection      MDM: Moderate Risk; ongoing likely GI bleeding from AVM's, HTN, acute PE, IVC filter placement        BILL Alexandra    [facility]  [address]ridget Chen [facility] [place]  [address]  [phone]

## 2025-06-17 ENCOUNTER — MED REC SCAN ONLY (OUTPATIENT)
Dept: INTERNAL MEDICINE CLINIC | Facility: CLINIC | Age: 85
End: 2025-06-17

## 2025-06-17 DIAGNOSIS — D50.0 IRON DEFICIENCY ANEMIA DUE TO CHRONIC BLOOD LOSS: ICD-10-CM

## 2025-06-17 DIAGNOSIS — K55.21 AVM (ARTERIOVENOUS MALFORMATION) OF SMALL BOWEL, ACQUIRED WITH HEMORRHAGE: Primary | ICD-10-CM

## 2025-06-18 ENCOUNTER — OFFICE VISIT (OUTPATIENT)
Dept: INTERNAL MEDICINE CLINIC | Facility: CLINIC | Age: 85
End: 2025-06-18

## 2025-06-18 ENCOUNTER — NURSE ONLY (OUTPATIENT)
Age: 85
End: 2025-06-18
Attending: INTERNAL MEDICINE
Payer: MEDICARE

## 2025-06-18 VITALS
OXYGEN SATURATION: 100 % | BODY MASS INDEX: 25.52 KG/M2 | SYSTOLIC BLOOD PRESSURE: 136 MMHG | DIASTOLIC BLOOD PRESSURE: 62 MMHG | WEIGHT: 135.19 LBS | TEMPERATURE: 98 F | HEART RATE: 81 BPM | HEIGHT: 61 IN

## 2025-06-18 DIAGNOSIS — N18.31 STAGE 3A CHRONIC KIDNEY DISEASE (HCC): Chronic | ICD-10-CM

## 2025-06-18 DIAGNOSIS — E55.9 VITAMIN D DEFICIENCY: ICD-10-CM

## 2025-06-18 DIAGNOSIS — D64.9 ACUTE ON CHRONIC ANEMIA: ICD-10-CM

## 2025-06-18 DIAGNOSIS — E11.9 TYPE 2 DIABETES MELLITUS WITHOUT RETINOPATHY (HCC): ICD-10-CM

## 2025-06-18 DIAGNOSIS — D50.0 IRON DEFICIENCY ANEMIA DUE TO CHRONIC BLOOD LOSS: ICD-10-CM

## 2025-06-18 DIAGNOSIS — K55.21 AVM (ARTERIOVENOUS MALFORMATION) OF SMALL BOWEL, ACQUIRED WITH HEMORRHAGE: ICD-10-CM

## 2025-06-18 DIAGNOSIS — D50.9 IRON DEFICIENCY ANEMIA, UNSPECIFIED IRON DEFICIENCY ANEMIA TYPE: ICD-10-CM

## 2025-06-18 DIAGNOSIS — Z71.85 VACCINE COUNSELING: ICD-10-CM

## 2025-06-18 DIAGNOSIS — J43.8 OTHER EMPHYSEMA (HCC): Primary | ICD-10-CM

## 2025-06-18 LAB
ANTIBODY SCREEN: NEGATIVE
BASOPHILS # BLD AUTO: 0.04 X10(3) UL (ref 0–0.2)
BASOPHILS NFR BLD AUTO: 0.4 %
DEPRECATED RDW RBC AUTO: 62.9 FL (ref 35.1–46.3)
EOSINOPHIL # BLD AUTO: 0.22 X10(3) UL (ref 0–0.7)
EOSINOPHIL NFR BLD AUTO: 2.2 %
ERYTHROCYTE [DISTWIDTH] IN BLOOD BY AUTOMATED COUNT: 18.4 % (ref 11–15)
HCT VFR BLD AUTO: 29.3 % (ref 35–48)
HGB BLD-MCNC: 8.9 G/DL (ref 12–16)
IMM GRANULOCYTES # BLD AUTO: 0.05 X10(3) UL (ref 0–1)
IMM GRANULOCYTES NFR BLD: 0.5 %
LYMPHOCYTES # BLD AUTO: 0.51 X10(3) UL (ref 1–4)
LYMPHOCYTES NFR BLD AUTO: 5.1 %
MCH RBC QN AUTO: 28.1 PG (ref 26–34)
MCHC RBC AUTO-ENTMCNC: 30.4 G/DL (ref 31–37)
MCV RBC AUTO: 92.4 FL (ref 80–100)
MONOCYTES # BLD AUTO: 0.67 X10(3) UL (ref 0.1–1)
MONOCYTES NFR BLD AUTO: 6.7 %
NEUTROPHILS # BLD AUTO: 8.56 X10 (3) UL (ref 1.5–7.7)
NEUTROPHILS # BLD AUTO: 8.56 X10(3) UL (ref 1.5–7.7)
NEUTROPHILS NFR BLD AUTO: 85.1 %
PLATELET # BLD AUTO: 211 10(3)UL (ref 150–450)
RBC # BLD AUTO: 3.17 X10(6)UL (ref 3.8–5.3)
RH BLOOD TYPE: POSITIVE
WBC # BLD AUTO: 10.1 X10(3) UL (ref 4–11)

## 2025-06-18 PROCEDURE — 1160F RVW MEDS BY RX/DR IN RCRD: CPT | Performed by: INTERNAL MEDICINE

## 2025-06-18 PROCEDURE — 1126F AMNT PAIN NOTED NONE PRSNT: CPT | Performed by: INTERNAL MEDICINE

## 2025-06-18 PROCEDURE — 3008F BODY MASS INDEX DOCD: CPT | Performed by: INTERNAL MEDICINE

## 2025-06-18 PROCEDURE — 3075F SYST BP GE 130 - 139MM HG: CPT | Performed by: INTERNAL MEDICINE

## 2025-06-18 PROCEDURE — 99495 TRANSJ CARE MGMT MOD F2F 14D: CPT | Performed by: INTERNAL MEDICINE

## 2025-06-18 PROCEDURE — 1159F MED LIST DOCD IN RCRD: CPT | Performed by: INTERNAL MEDICINE

## 2025-06-18 PROCEDURE — 3078F DIAST BP <80 MM HG: CPT | Performed by: INTERNAL MEDICINE

## 2025-06-18 RX ORDER — FOLIC ACID 1 MG/1
TABLET ORAL
Qty: 4 CAPSULE | Refills: 4 | Status: SHIPPED | OUTPATIENT
Start: 2025-06-18

## 2025-06-18 RX ORDER — GLIPIZIDE 2.5 MG/1
2.5 TABLET ORAL EVERY 12 HOURS
Qty: 60 TABLET | Refills: 1 | Status: SHIPPED | OUTPATIENT
Start: 2025-06-18

## 2025-06-18 RX ORDER — ALBUTEROL SULFATE 0.83 MG/ML
2.5 SOLUTION RESPIRATORY (INHALATION) 2 TIMES DAILY
Qty: 60 EACH | Refills: 3 | Status: SHIPPED | OUTPATIENT
Start: 2025-06-18

## 2025-06-18 NOTE — PROGRESS NOTES
HPI:    Patient ID: Chester Bautista is a 85 year old female.    Chief Complaint   Patient presents with    Follow - Up     Patient states she is her for a follow up.    Discharge Summary           Chester Bautista Patient Status:  Inpatient    1940 MRN [id number]   Location [address] Attending Nelia Hoyt *   Hosp Day # 3 PCP Heike Sorto MD      Date of Admission: 2025     Disposition: Home or Self Care      Date of Discharge: 2025        Admitting Diagnosis: Hypoxemia [R09.02]  Melena [K92.1]  Acute pulmonary edema (HCC) [J81.0]  Gastrointestinal hemorrhage, unspecified gastrointestinal hemorrhage type [K92.2]  Hypervolemia, unspecified hypervolemia type [E87.70]  Anemia, unspecified type [D64.9]     Hospital Discharge Diagnoses: gi bleed, avm, copd, chf diastolic      Hospital Discharge Diagnoses: as above      TCM Follow-Up Recommendation:  LACE > 58: High Risk of readmission after discharge from the hospital.       Risk of readmission: Chester Bautista has High Risk of readmission after discharge from the hospital.     Problem List:       Diagnosis    Adult general medical examination    Vaccine counseling    Colon cancer screening    Type 2 diabetes mellitus without retinopathy (HCC)    Sarcoidosis    Anemia, iron deficiency    Dyslipidemia    Hypercalcemia    Hypertension    Infiltrating ductal carcinoma of right breast, stage 1 (HCC)    Rheumatoid arthritis (HCC)    Osteoarthrosis    Peripheral vascular disease due to secondary diabetes mellitus (HCC)    Neuropathy    AVM (arteriovenous malformation) of duodenum, acquired    Pseudophakia of both eyes    Vitreous floaters of both eyes    Glaucoma suspect of both eyes    Iron deficiency anemia due to chronic blood loss    Malabsorption of iron (HCC)    Port-A-Cath in place    Chronic obstructive pulmonary disease, unspecified (HCC)    Adnexal mass    Encounter for care related to vascular  access port    High risk medication use    Meibomian gland dysfunction (MGD) of both eyes    Hypokalemia    Hyperkalemia    Hypomagnesemia    Constipation    Gastric AVM    AVM (arteriovenous malformation) of small bowel, acquired with hemorrhage    Leukocytosis    Presence of IVC filter    CKD (chronic kidney disease) stage 3, GFR 30-59 ml/min (HCC)    Dry eye syndrome of both eyes    Vitamin D deficiency    Cystic thyroid nodule    Cough    Chronic fatigue    Cerebellar stroke (HCC)    Dizziness    Right hand paresthesia    Thrombus of aorta (HCC)    Bilateral pulmonary embolism (HCC)    Acute cystitis without hematuria    PE (pulmonary thromboembolism) (HCC)    Aortic thrombus (HCC)    New cerebellar infarct (HCC)    Small bowel bleed not requiring more than 4 units of blood in 24 hours, ICU, or surgery    Acute blood loss anemia    Cerebrovascular accident (CVA) due to embolism of precerebral artery (HCC)    Melena    Goals of care, counseling/discussion    Palliative care encounter    AVM (arteriovenous malformation) (HCC)    History of embolic stroke    History of DVT (deep vein thrombosis)    Iron deficiency anemia, unspecified    Parkinson's disease (HCC)    Sequela, post-stroke    Daily headache    Restless leg    Acute hypoxic respiratory failure (HCC)    COPD (chronic obstructive pulmonary disease) (HCC)    Community acquired pneumonia, unspecified laterality    Acute on chronic anemia    Claustrophobia    Anemia, unspecified type    Acute pulmonary edema (HCC)    Hypervolemia, unspecified hypervolemia type    Gastrointestinal hemorrhage, unspecified gastrointestinal hemorrhage type    Hypoxemia        Reason for Admission: sob      History of Present Illness:   HISTORY OF PRESENT ILLNESS:  This is a very pleasant 85-year-old  female who looks much younger than her stated age who presents with a history of having recurrent AVM-related bleeding.  She has been more short of breath for the  last week, worse when lying down, and had black stools, had decreased O2 saturations on room air in the 70s, and otherwise now will be getting blood transfusions and admitted to the hospital.  She will be seen by Cardiology, Pulmonary, and GI.        Hospital Course:      Probably AVM causing GI bleed and GI blood-loss anemia, severe, requiring transfusion and causing dyspnea. Await double balloon eneteroscopy at [facility]; none here - pt has appt with Dr Gustafson end of month at [facility].   2.       Acute hypoxemic respiratory failure perhaps from COPD and anemia.  Wean off o2- unable to wean with exercise o 2 sat 83% will need home oxygen . Home later today after cleared by Dr Parker   3.       CHF.  Currently the type is unknown but it appears to me that it may be an acute on chronic diastolic congestive heart failure. Cardiology signed off. Received lasix with transfusion   4.       Hypertension.  Given bleeding, will continue medications.  5.       Rheumatoid arthritis.  We will start stress doses of steroids. Transition to prednisone today and f/u with Dr Goodrich for taper as pt reports she is planning this outpt   6.       Diabetes.  Will cover with sliding scale   7.       Code Status:  Full Code.  Discussed with patient during this hospitalization with her daughter present.        Complex mdm        Consultations: Dr Parker, Dr Mcqueen, Dr Wilkinson      Procedures: none     Complications: none     Discharge Condition: Good     Discharge Medications:       Discharge Medications          CONTINUE taking these medications         Instructions Prescription details   BD Pen Needle Tata 2nd Gen 32G X 4 MM Misc  Generic drug: Insulin Pen Needle       1 EACH DAILY. USE DAILY WITH INSULIN    Quantity: 100 each  Refills: 1      Blood Gluc Meter Disp-Strips Nica       Dx. E11.9. Checks qam.    Quantity: 100 each  Refills: 0      OneTouch Delica Lancets 33G Misc       4 x daily    Quantity: 100 each  Refills: 1      Accu-Chek FastClix Lancets  Misc       Check sugar once daily as directed (E11.42)    Quantity: 100 each  Refills: 2      OneTouch Verio w/Device Kit       1 each daily. Test 1x daily    Quantity: 1 kit  Refills: 0      Blood Glucose System Adrian Kit       Dx. E11.9. Checks qam.    Quantity: 1 kit  Refills: 0      Blood Glucose Monitoring Suppl w/Device Kit       Dx. E11.9. Checks qam.    Quantity: 1 kit  Refills: 0                ASK your doctor about these medications         Instructions Prescription details   acetaminophen 325 MG Tabs  Commonly known as: Tylenol       Take 2 tablets (650 mg total) by mouth every 6 (six) hours as needed.    Refills: 0      albuterol 108 (90 Base) MCG/ACT Aers  Commonly known as: Ventolin HFA       TAKE 2 PUFFS BY MOUTH EVERY 4 TO 6 HOURS AS NEEDED    Quantity: 6.7 each  Refills: 5      aspirin 81 MG Tbec  Commonly known as: Aspirin Low Dose       Take 1 tablet (81 mg total) by mouth in the morning.    Quantity: 90 tablet  Refills: 3      benzonatate 200 MG Caps  Commonly known as: Tessalon       Take 1 capsule (200 mg total) by mouth 3 (three) times daily as needed for cough.    Quantity: 15 capsule  Refills: 0      carvedilol 25 MG Tabs  Commonly known as: Coreg       Take 1 tablet (25 mg total) by mouth 2 (two) times daily with meals.    Quantity: 180 tablet  Refills: 3      fluticasone-salmeterol 250-50 MCG/ACT Aepb  Commonly known as: Advair Diskus  Ask about: Should I take this medication?       Inhale 1 puff into the lungs 2 (two) times daily.    Stop taking on: Zulema 3, 2025  Quantity: 60 each  Refills: 0      gabapentin 100 MG Caps  Commonly known as: Neurontin       Take 1 capsule (100 mg total) by mouth nightly.    Refills: 0      glipiZIDE 2.5 MG Tabs       Take 2.5 mg by mouth every 12 (twelve) hours.    Quantity: 60 tablet  Refills: 1      hydrALAZINE 25 MG Tabs  Commonly known as: Apresoline  Ask about: Should I take this medication?       Take 1 tablet (25 mg total) by mouth every 8 (eight)  hours.    Stop taking on: June 4, 2025  Quantity: 90 tablet  Refills: 0      insulin lispro 100 UNIT/ML Soln  Commonly known as: Humalog       Inject 3 Units into the skin in the morning and 3 Units at noon and 3 Units in the evening. Inject before meals. Sliding scale depending on blood sugar.    Refills: 0      ipratropium 0.02 % Soln  Commonly known as: Atrovent       Take 2.5 mL (500 mcg total) by nebulization 2 (two) times daily.    Quantity: 187.5 mL  Refills: 2      Lantus SoloStar 100 UNIT/ML Sopn  Generic drug: insulin glargine       Inject 10 Units into the skin every morning.    Refills: 0      lidocaine-menthol 4-1 % Ptch       Place 1 patch onto the skin in the morning.    Refills: 0      losartan 100 MG Tabs  Commonly known as: Cozaar  Ask about: Should I take this medication?       Take 1 tablet (100 mg total) by mouth daily.    Stop taking on: June 4, 2025  Quantity: 30 tablet  Refills: 0      losartan 100 MG Tabs  Commonly known as: Cozaar       Take 1 tablet (100 mg total) by mouth daily.    Quantity: 90 tablet  Refills: 3      magnesium oxide 400 MG Tabs  Commonly known as: Mag-Ox       Take 1 tablet (400 mg total) by mouth 2 (two) times daily. Per Dr. JENSEN Mata    Refills: 0      montelukast 10 MG Tabs  Commonly known as: Singulair       Take 1 tablet (10 mg total) by mouth nightly.    Quantity: 90 tablet  Refills: 3      pantoprazole 40 MG Tbec  Commonly known as: Protonix       Take 1 tablet (40 mg total) by mouth in the morning and 1 tablet (40 mg total) in the evening. Take before meals.    Quantity: 60 tablet  Refills: 0      predniSONE 5 MG Tabs  Commonly known as: Deltasone       Take 1 tablet (5 mg total) by mouth daily.    Quantity: 90 tablet  Refills: 1      predniSONE 20 MG Tabs  Commonly known as: Deltasone  Start taking on: May 5, 2025  Ask about: Should I take this medication?       Take 1 tablet (20 mg total) by mouth daily with breakfast for 2 days, THEN 0.5 tablets (10 mg total)  daily with breakfast for 2 days.    Stop taking on: May 9, 2025  Quantity: 3 tablet  Refills: 0      rOPINIRole 0.25 MG Tabs  Commonly known as: Requip       Take 1 tablet (0.25 mg total) by mouth every evening.    Quantity: 90 tablet  Refills: 1      rosuvastatin 20 MG Tabs  Commonly known as: Crestor       Take 1 tablet (20 mg total) by mouth nightly.    Quantity: 90 tablet  Refills: 3                   Follow up Visits: Follow-up with pcp in 1 week     Follow up Labs: none      Other Discharge Instructions: f/u with [facility] end of month   +Orthopnea.  3 pillow orthopnea. 2 L oxygen at bedtime and with activity. Slower pace activity but issues mainly at bedtime. Sees pulm. 7-25. Uses neb. 2 week. HH and [facility]ela PT. PT 2 times a week. OT 2 times a week. RN came for eval.  Scheduled at [facility] for capsul EGD.   NO BRBPR nor melena. Last BM yesterday AM.     Patient here for follow up of Diabetes.  Has been taking medications regularly.    Checks sugars several times daily.  Fasting sugars average  200's. No lows. Occ. at lantus 7 units at am daughter dropped due to early AM sugar at 64.   Watches diabetic diet, low salt.  Diabetic Flow sheet      6/18/2025     8:04 PM 6/18/2025    11:24 AM 6/18/2025    10:40 AM 6/18/2025    10:35 AM   DIABETIC FLOWSHEET ([facility])   BMI    25.55 kg/m2   Losartan Potassium 100 mg Daily  mg Daily OR  100 mg Daily OR  100 mg Daily OR    Weight (enc vitals)    135 lb 3.2 oz   BP (enc vitals)  136/62 154/54 149/54      HISTORY OF DIABETES COMPLICATIONS: :  History of Retinopathy: N  History of Neuropathy: N  History of Nephropathy: Y     ASSOCIATED COMPLICATIONS:   HTN: Y  Hyperlipidemia: Y  Coronary Artery Disease:  CAD,  HF, PAD  Cerebrovascular Disease: Y     MEALS:  2-3, usu. Only breakfest and dinner, meals a day  Cooks at home  Wt Readings from Last 3 Encounters:   06/18/25 135 lb 3.2 oz (61.3 kg)   06/13/25 139 lb (63 kg)   06/11/25 132 lb 11.2 oz (60.2 kg)     BP Readings from Last 3  Encounters:   06/18/25 136/62   06/13/25 (!) 166/76   06/11/25 149/80     Labs:   Lab Results   Component Value Date/Time     (H) 06/11/2025 04:36 AM     06/11/2025 04:36 AM    K 3.8 06/11/2025 04:36 AM     06/11/2025 04:36 AM    CO2 30.0 06/11/2025 04:36 AM    CREATSERUM 1.09 (H) 06/11/2025 04:36 AM    CA 8.9 06/11/2025 04:36 AM    AST 48 (H) 06/09/2025 05:05 AM    ALT 25 06/09/2025 05:05 AM    TSH 0.418 (L) 01/17/2025 08:02 PM    T4F 1.2 01/17/2025 08:02 PM        Lab Results   Component Value Date/Time    CHOLEST 100 03/27/2025 02:57 PM    HDL 49 03/27/2025 02:57 PM    TRIG 101 03/27/2025 02:57 PM    LDL 32 03/27/2025 02:57 PM    NONHDLC 51 03/27/2025 02:57 PM       Lab Results   Component Value Date/Time    A1C 5.6 04/10/2025 02:00 PM      Lab Results   Component Value Date    VITD 16.5 (L) 06/13/2025 6/18/2025     8:04 PM 6/18/2025    11:24 AM 6/18/2025    10:40 AM 6/18/2025    10:35 AM   DIABETIC FLOWSHEET ([facility])   BMI    25.55 kg/m2   Losartan Potassium 100 mg Daily  mg Daily OR  100 mg Daily OR  100 mg Daily OR    Weight (enc vitals)    135 lb 3.2 oz   BP (enc vitals)  136/62 154/54 149/54     -Re: potential DM medication contraindication (if positive, checkbox selected):  [] History of pancreatitis  [] Personal/fam hx of medullary thyroid cancer/MEN2 Paternal grandmother had thyroid removed, lived to be 80 years old, was secretive about their health conditions, does not know if they had thyroid cancer dx  [] History of recent/frequent UTI/yeast infxn  [] Previous amputation related to diabetes  [] Hx of [id number] euglycemic DKA     -Presence of associated DM complications (if positive, checkbox selected):  [] Macrovascular complications (CAD/CVA/PAD)  [] Neuropathy  [] Retinopathy  [x] Nephropathy  [x] HTN  [x] Hyperlipidemia  [x] Stroke/TIA  [] Gastroparesis  [] Wound, ulcer or amputations     Hypertension  Patient is here for follow up of hypertension. BP at home:  130/80's. Different times. No changes with timing.   Has been compliant with medications.  Exercise level: somewhat active and has been following low salt diet.  Weight has been down. Not snacker. Not eat out much. Likes cracker and chips at bedtime.   Wt Readings from Last 3 Encounters:   06/18/25 135 lb 3.2 oz (61.3 kg)   06/13/25 139 lb (63 kg)   06/11/25 132 lb 11.2 oz (60.2 kg)     BP Readings from Last 3 Encounters:   06/18/25 136/62   06/13/25 (!) 166/76 06/11/25 149/80     Labs:   Lab Results   Component Value Date/Time     (H) 06/11/2025 04:36 AM     06/11/2025 04:36 AM    K 3.8 06/11/2025 04:36 AM     06/11/2025 04:36 AM    CO2 30.0 06/11/2025 04:36 AM    CREATSERUM 1.09 (H) 06/11/2025 04:36 AM    CA 8.9 06/11/2025 04:36 AM    AST 48 (H) 06/09/2025 05:05 AM    ALT 25 06/09/2025 05:05 AM    TSH 0.418 (L) 01/17/2025 08:02 PM    T4F 1.2 01/17/2025 08:02 PM        Lab Results   Component Value Date/Time    CHOLEST 100 03/27/2025 02:57 PM    HDL 49 03/27/2025 02:57 PM    TRIG 101 03/27/2025 02:57 PM    LDL 32 03/27/2025 02:57 PM    NONHDLC 51 03/27/2025 02:57 PM            Wt Readings from Last 3 Encounters:   06/18/25 135 lb 3.2 oz (61.3 kg)   06/13/25 139 lb (63 kg)   06/11/25 132 lb 11.2 oz (60.2 kg)     BP Readings from Last 3 Encounters:   06/18/25 136/62   06/13/25 (!) 166/76   06/11/25 149/80     Labs:   Lab Results   Component Value Date/Time     (H) 06/11/2025 04:36 AM     06/11/2025 04:36 AM    K 3.8 06/11/2025 04:36 AM     06/11/2025 04:36 AM    CO2 30.0 06/11/2025 04:36 AM    CREATSERUM 1.09 (H) 06/11/2025 04:36 AM    CA 8.9 06/11/2025 04:36 AM    AST 48 (H) 06/09/2025 05:05 AM    ALT 25 06/09/2025 05:05 AM    TSH 0.418 (L) 01/17/2025 08:02 PM    T4F 1.2 01/17/2025 08:02 PM        Lab Results   Component Value Date/Time    CHOLEST 100 03/27/2025 02:57 PM    HDL 49 03/27/2025 02:57 PM    TRIG 101 03/27/2025 02:57 PM    LDL 32 03/27/2025 02:57 PM    NONHDLC 51  03/27/2025 02:57 PM          HPI      Review of Systems   Constitutional:  Positive for activity change. Negative for appetite change, chills, diaphoresis, fatigue, fever and unexpected weight change.   HENT:  Negative for congestion, dental problem, drooling, ear discharge, ear pain, facial swelling, hearing loss, mouth sores, nosebleeds, postnasal drip, rhinorrhea, sinus pressure, sinus pain, sneezing, sore throat, tinnitus, trouble swallowing and voice change.    Eyes:  Negative for photophobia, pain, discharge, redness, itching and visual disturbance.   Respiratory:  Positive for shortness of breath. Negative for apnea, cough, choking, chest tightness, wheezing and stridor.    Cardiovascular:  Negative for chest pain, palpitations and leg swelling.   Gastrointestinal:  Negative for abdominal distention, abdominal pain, anal bleeding, blood in stool, constipation, diarrhea, nausea, rectal pain and vomiting.   Endocrine: Negative for cold intolerance, heat intolerance, polydipsia, polyphagia and polyuria.   Genitourinary:  Negative for decreased urine volume, difficulty urinating, dysuria, flank pain, frequency, genital sores, hematuria, menstrual problem, pelvic pain and urgency.   Skin:  Negative for rash.   Allergic/Immunologic: Negative for environmental allergies.   Neurological:  Negative for dizziness, tremors, seizures, syncope, facial asymmetry, speech difficulty, weakness, light-headedness, numbness and headaches.   All other systems reviewed and are negative.        Current Outpatient Medications   Medication Sig Dispense Refill    glipiZIDE 2.5 MG Oral Tab Take 2.5 mg by mouth every 12 (twelve) hours. 1 po qam breakfest and 1/2 pre dinner. 60 tablet 1    albuterol (2.5 MG/3ML) 0.083% Inhalation Nebu Soln Take 3 mL (2.5 mg total) by nebulization in the morning and 3 mL (2.5 mg total) before bedtime. 60 each 3    Cholecalciferol (VITAMIN D3) 1.25 MG (13178 UT) Oral Cap 1 po q2week. 4 capsule 4     FLUTICASONE-SALMETEROL 250-50 MCG/ACT Inhalation Aerosol Powder, Breath Activated Inhale 1 puff into the lungs 2 (two) times daily. 60 each 0    hydrALAZINE 25 MG Oral Tab Take 1 tablet (25 mg total) by mouth every 8 (eight) hours. 90 tablet 0    losartan 100 MG Oral Tab Take 1 tablet (100 mg total) by mouth daily. 90 tablet 3    rOPINIRole 0.25 MG Oral Tab Take 1 tablet (0.25 mg total) by mouth every evening. 90 tablet 1    carvedilol 25 MG Oral Tab Take 1 tablet (25 mg total) by mouth 2 (two) times daily with meals. 180 tablet 3    pantoprazole 40 MG Oral Tab EC Take 1 tablet (40 mg total) by mouth in the morning and 1 tablet (40 mg total) in the evening. Take before meals. 60 tablet 0    LANTUS SOLOSTAR 100 UNIT/ML Subcutaneous Solution Pen-injector Inject 10 Units into the skin every morning.      benzonatate 200 MG Oral Cap Take 1 capsule (200 mg total) by mouth 3 (three) times daily as needed for cough. 15 capsule 0    gabapentin 100 MG Oral Cap Take 1 capsule (100 mg total) by mouth nightly.      insulin lispro 100 UNIT/ML Injection Solution Inject 3 Units into the skin in the morning and 3 Units at noon and 3 Units in the evening. Inject before meals. Sliding scale depending on blood sugar.      ipratropium 0.02 % Inhalation Solution Take 2.5 mL (500 mcg total) by nebulization 2 (two) times daily. 187.5 mL 2    lidocaine-menthol 4-1 % External Patch Place 1 patch onto the skin in the morning.      acetaminophen 325 MG Oral Tab Take 2 tablets (650 mg total) by mouth every 6 (six) hours as needed.      rosuvastatin 20 MG Oral Tab Take 1 tablet (20 mg total) by mouth nightly. 90 tablet 3    magnesium oxide 400 MG Oral Tab Take 1 tablet (400 mg total) by mouth 2 (two) times daily. Per Dr. JENSEN Mata      predniSONE 5 MG Oral Tab Take 1 tablet (5 mg total) by mouth daily. 90 tablet 1    albuterol 108 (90 Base) MCG/ACT Inhalation Aero Soln TAKE 2 PUFFS BY MOUTH EVERY 4 TO 6 HOURS AS NEEDED 6.7 each 5    BD PEN NEEDLE  KADEN 2ND GEN 32G X 4 MM Does not apply Misc 1 EACH DAILY. USE DAILY WITH INSULIN 100 each 1    montelukast 10 MG Oral Tab Take 1 tablet (10 mg total) by mouth nightly. 90 tablet 3    Blood Glucose Monitoring Suppl (BLOOD GLUCOSE SYSTEM GILLES) Does not apply Kit Dx. E11.9. Checks qam. 1 kit 0    Blood Glucose Monitoring Suppl w/Device Does not apply Kit Dx. E11.9. Checks qam. 1 kit 0    Blood Gluc Meter Disp-Strips Does not apply Device Dx. E11.9. Checks qam. 100 each 0    Accu-Chek FastClix Lancets Does not apply Misc Check sugar once daily as directed (E11.42) 100 each 2    Blood Glucose Monitoring Suppl (ONETOUCH VERIO) w/Device Does not apply Kit 1 each daily. Test 1x daily 1 kit 0    OneTouch Delica Lancets 33G Does not apply Misc 4 x daily 100 each 1     Allergies:  Allergies   Allergen Reactions    Celebrex [Celecoxib] PALPITATIONS    Penicillins ITCHING and SWELLING    Amlodipine OTHER (SEE COMMENTS)     Calves Swelling     Metformin And Related UNKNOWN    Olmesartan UNKNOWN       HISTORY:  Past Medical History:    Anxiety state    Cancer (HCC)    breast cancer 2018    Cerebellar stroke (HCC)    Chronic obstructive pulmonary disease, unspecified (HCC)    Chronic obstructive pulmonary disease, unspecified (HCC)    COPD (chronic obstructive pulmonary disease) (HCC)    Cystic thyroid nodule    Diabetes (HCC)    Diabetes mellitus (HCC)    Essential hypertension    Exposure to medical diagnostic radiation    High blood pressure    High cholesterol    History of blood transfusion    low hemoglobin    Osteoarthritis    PONV (postoperative nausea and vomiting)    Pulmonary embolism (HCC)    Pulmonary emphysema (HCC)    Rheumatoid arthritis (HCC)    Stroke (HCC)      Past Surgical History:   Procedure Laterality Date    Cataract extraction w/  intraocular lens implant Bilateral 2017     in [facility]     Colonoscopy      Colonoscopy N/A 07/21/2022    Procedure: COLONOSCOPY;  Surgeon: Mikey Garcia MD;  Location:  [facility] ENDOSCOPY    Colonoscopy N/A 06/15/2023    Procedure: COLONOSCOPY;  Surgeon: Mikey Garcia MD;  Location: [facility] ENDOSCOPY    Correct bunion,simple      right foot      Egd  2023    Dr. Garcia; Duodenal AVM's x4 cauterized    Hysterectomy      , no abnormal Paps, due to heavy bleeding with fibroids.  Not sure if it had bilateral salpingo-oophorectomy.    Ir aneurysm repairm      Computer assisted volumetric craniofomy with clipping of anterior cerebral artery.    Lumpectomy right      Radiation right      Rotator cuff repair      1993    Total knee replacement Right     Total knee replacement Left 2015    Transoral incisionless fundoplication - internal  2012 Fredy fundoplication at [facility] Dr. Fournier.    Fredy fundoplication at [facility] Dr. Fournier.    Trigger finger release      left hand      Yag capsulotomy - ou - both eyes Bilateral 2021    done in [place]      Family History   Problem Relation Age of Onset    Heart Surgery Mother         Leaky valve at 39 y/o.     Prostate Cancer Brother     Cancer Brother     Diabetes Maternal Grandmother     Diabetes Paternal Aunt     Breast Cancer Self 78    Breast Cancer Niece         before 50    Macular degeneration Neg     Glaucoma Neg       Social History:   Social History     Socioeconomic History    Marital status:    Tobacco Use    Smoking status: Former     Current packs/day: 0.00     Types: Cigarettes     Quit date:      Years since quittin.4     Passive exposure: Past    Smokeless tobacco: Never    Tobacco comments:     [id number]   Vaping Use    Vaping status: Never Used   Substance and Sexual Activity    Alcohol use: Never    Drug use: Never   Social History Narrative    Just moved in from [place].   passed away and that is why moved from [place] to be with daughter who lives in [place].  Currently lives with daughter.     Social Drivers of Health      Food Insecurity: No Food Insecurity (6/8/2025)    NCSS - Food Insecurity     Worried About Running Out of Food in the Last Year: No     Ran Out of Food in the Last Year: No   Transportation Needs: No Transportation Needs (6/8/2025)    NCSS - Transportation     Lack of Transportation: No   Housing Stability: Not At Risk (6/8/2025)    NCSS - Housing/Utilities     Has Housing: Yes     Worried About Losing Housing: No     Unable to Get Utilities: No        PHYSICAL EXAM:   /62 (BP Location: Left arm, Patient Position: Sitting, Cuff Size: adult)   Pulse 81   Temp 97.6 °F (36.4 °C) (Temporal)   Ht 5' 1\" (1.549 m)   Wt 135 lb 3.2 oz (61.3 kg)   SpO2 100%   BMI 25.55 kg/m²   BP Readings from Last 3 Encounters:   06/18/25 136/62   06/13/25 (!) 166/76   06/11/25 149/80     Wt Readings from Last 3 Encounters:   06/18/25 135 lb 3.2 oz (61.3 kg)   06/13/25 139 lb (63 kg)   06/11/25 132 lb 11.2 oz (60.2 kg)        06/11/25 0937   Mobility   O2 walk? Yes   SPO2% on Room Air at Rest 94   SPO2% on Oxygen at Rest 99   At rest oxygen flow (liters per minute) 2   SPO2% Ambulation on Room Air 85   SPO2% Ambulation on Oxygen 94   Ambulation oxygen flow (liters per minute) 2      Physical Exam  Vitals and nursing note reviewed.   Constitutional:       General: She is not in acute distress.     Appearance: Normal appearance. She is well-developed and well-groomed. She is not ill-appearing, toxic-appearing or diaphoretic.      Interventions: She is not intubated.  HENT:      Head:      Jaw: No trismus.      Right Ear: Tympanic membrane, ear canal and external ear normal. No decreased hearing noted. No laceration, drainage, swelling or tenderness. No middle ear effusion. No foreign body. No mastoid tenderness. No hemotympanum. Tympanic membrane is not injected, scarred, perforated, erythematous, retracted or bulging. Tympanic membrane has normal mobility.      Left Ear: Tympanic membrane, ear canal and external ear normal. No  decreased hearing noted. No laceration, drainage, swelling or tenderness.  No middle ear effusion. No foreign body. No mastoid tenderness. No hemotympanum. Tympanic membrane is not injected, scarred, perforated, erythematous, retracted or bulging. Tympanic membrane has normal mobility.      Nose: Nose normal. No nasal deformity, laceration, mucosal edema or rhinorrhea.      Right Sinus: No maxillary sinus tenderness or frontal sinus tenderness.      Left Sinus: No maxillary sinus tenderness or frontal sinus tenderness.      Mouth/Throat:      Lips: Pink. No lesions.      Mouth: Mucous membranes are moist. Mucous membranes are not pale, not dry and not cyanotic. No injury, lacerations, oral lesions or angioedema.      Dentition: No dental tenderness, gingival swelling, dental abscesses or gum lesions.      Tongue: No lesions. Tongue does not deviate from midline.      Palate: No mass and lesions.      Pharynx: Oropharynx is clear. Uvula midline. No pharyngeal swelling, oropharyngeal exudate, posterior oropharyngeal erythema, uvula swelling or postnasal drip.      Tonsils: No tonsillar exudate or tonsillar abscesses.   Eyes:      General: No scleral icterus.     Conjunctiva/sclera: Conjunctivae normal.   Neck:      Thyroid: No thyroid mass or thyromegaly.      Trachea: Trachea and phonation normal.   Cardiovascular:      Rate and Rhythm: Normal rate and regular rhythm.      Pulses: Normal pulses. No decreased pulses.           Carotid pulses are 2+ on the right side and 2+ on the left side.       Radial pulses are 2+ on the right side and 2+ on the left side.        Dorsalis pedis pulses are 2+ on the right side and 2+ on the left side.        Posterior tibial pulses are 2+ on the right side and 2+ on the left side.      Heart sounds: Normal heart sounds, S1 normal and S2 normal.   Pulmonary:      Effort: Pulmonary effort is normal. Tachypnea and prolonged expiration present. No bradypnea, accessory muscle usage,  respiratory distress or retractions. She is not intubated.      Breath sounds: Decreased air movement present. No stridor or transmitted upper airway sounds. Decreased breath sounds present. No wheezing, rhonchi or rales.   Chest:      Chest wall: No tenderness.   Abdominal:      General: Bowel sounds are normal. There is no distension.      Palpations: Abdomen is soft. Abdomen is not rigid. There is no mass.      Tenderness: There is no abdominal tenderness. There is no right CVA tenderness, left CVA tenderness, guarding or rebound.   Musculoskeletal:      Cervical back: Neck supple.      Right lower leg: No edema.      Left lower leg: No edema.   Lymphadenopathy:      Head:      Right side of head: No submental, submandibular, preauricular, posterior auricular or occipital adenopathy.      Left side of head: No submental, submandibular, preauricular, posterior auricular or occipital adenopathy.      Cervical: No cervical adenopathy.      Right cervical: No superficial, deep or posterior cervical adenopathy.     Left cervical: No superficial, deep or posterior cervical adenopathy.      Upper Body:      Right upper body: No supraclavicular adenopathy.      Left upper body: No supraclavicular adenopathy.   Skin:     General: Skin is warm and dry.   Neurological:      Mental Status: She is alert and oriented to person, place, and time.   Psychiatric:         Mood and Affect: Mood normal.         Behavior: Behavior normal. Behavior is cooperative.         Thought Content: Thought content normal.              ASSESSMENT/PLAN:     Encounter Diagnoses   Name Primary?    Other emphysema (HCC) Increase neb. 2 times a day. FU pulm.    Yes    Vitamin D deficiency Lower. Check blood in 3 months. Take D every 2 weeks.        Stage 3a chronic kidney disease (HCC) No motrin, ibuprofen, advil, alleve, naprosyn  with these medications.         Acute on chronic anemia Improved. Hold to recheck.        Vaccine counseling Recommend  RSV vaccine.  Would check on insurance coverage at local pharmacy.  RSV is a one-time vaccine as of now.  Potential side effects with RSV vaccine are low-grade fevers body aches etc.  Also would recommend flu shot.  Separate from RSV vaccine by 2 weeks.  Also would recommend new COVID-vaccine.  Separate from other 2 vaccines by 2 weeks. Recc. TdaP. Check on insurnace coverage. Recommend shingles vaccine.  There is 2 doses of the vaccine  by 2 months 6 months apart.  Check on insurance coverage on shingles vaccine.  May be covered better at the pharmacy.  Separate shingles vaccine from all of the vaccines by at least 1 to 2 weeks.  Discussed about side effects of vaccine.        Type 2 diabetes mellitus without retinopathy (HCC) Higher.  Decrease glipizide for dinnertime dose to a half a tablet predinner.  Change Lantus to 10 units at night.  Call in 1 week.  Follow-up with endocrinology.Careful with diet and excercise at least 30 minutes 3-4 times a week. Check sugars at different times on different dates. Careful with low sugars. Carry something with you and check sugar if can. Can carry hanh cracker, etc. Decrease carbohydrates. But also, careful with fruits and natural sugars.One serving a day and no more than 1 handful every day. Check feet  every AM and careful with sores and ulcers on feet bilaterally. Check eyes every year with dilated eye exam.  Check sugars.  2-hour postmeal should be less than 140s.  Pre-meal should be 's.  Both equally affected A1c.  Discussed importance of glycemic control to prevent complications of diabetes  -Discussed complications of diabetes include retinopathy, neuropathy, nephropathy and cardiovascular disease  -Discussed ABCs of DM  -Discussed importance of SBGM  -Discussed importance of low CHO diet, recommend 45gm per meal or 135gm per day  -FU with optho        Don't touch tip of bottle to eyes nor with hands. Don't rub eyes. Use separate towels. Wash towels  and wash cloths with bleach if can or hot water. Throw away contact lenses if can and all make up. If eyes get worse or new symptoms, go to ER immediately. Was hands thoroughly. Don't pry eyes open, Use moist towel and slowly open eyes.      Orders Placed This Encounter   Procedures    Vitamin D       Meds This Visit:  Requested Prescriptions     Signed Prescriptions Disp Refills    glipiZIDE 2.5 MG Oral Tab 60 tablet 1     Sig: Take 2.5 mg by mouth every 12 (twelve) hours. 1 po qam breakfest and 1/2 pre dinner.    albuterol (2.5 MG/3ML) 0.083% Inhalation Nebu Soln 60 each 3     Sig: Take 3 mL (2.5 mg total) by nebulization in the morning and 3 mL (2.5 mg total) before bedtime.    Cholecalciferol (VITAMIN D3) 1.25 MG (15845 UT) Oral Cap 4 capsule 4     Si po q2week.       Imaging & Referrals:  OPHTHALMOLOGY - EXTERNAL  OPHTHALMOLOGY - INTERNAL      Has set nayely't.

## 2025-06-18 NOTE — PATIENT INSTRUCTIONS
ASSESSMENT/PLAN:     Encounter Diagnoses   Name Primary?    Other emphysema (HCC) Increase neb. 2 times a day. FU pulm.    Yes    Vitamin D deficiency Lower. Check blood in 3 months. Take D every 2 weeks.        Stage 3a chronic kidney disease (HCC) No motrin, ibuprofen, advil, alleve, naprosyn  with these medications.         Acute on chronic anemia Improved. Hold to recheck.        Vaccine counseling Recommend RSV vaccine.  Would check on insurance coverage at local pharmacy.  RSV is a one-time vaccine as of now.  Potential side effects with RSV vaccine are low-grade fevers body aches etc.  Also would recommend flu shot.  Separate from RSV vaccine by 2 weeks.  Also would recommend new COVID-vaccine.  Separate from other 2 vaccines by 2 weeks. Recc. TdaP. Check on insurnace coverage. Recommend shingles vaccine.  There is 2 doses of the vaccine  by 2 months 6 months apart.  Check on insurance coverage on shingles vaccine.  May be covered better at the pharmacy.  Separate shingles vaccine from all of the vaccines by at least 1 to 2 weeks.  Discussed about side effects of vaccine.        Type 2 diabetes mellitus without retinopathy (HCC) Higher.  Decrease glipizide for dinnertime dose to a half a tablet predinner.  Change Lantus to 10 units at night.  Call in 1 week.  Follow-up with endocrinology.Careful with diet and excercise at least 30 minutes 3-4 times a week. Check sugars at different times on different dates. Careful with low sugars. Carry something with you and check sugar if can. Can carry hanh cracker, etc. Decrease carbohydrates. But also, careful with fruits and natural sugars.One serving a day and no more than 1 handful every day. Check feet  every AM and careful with sores and ulcers on feet bilaterally. Check eyes every year with dilated eye exam.  Check sugars.  2-hour postmeal should be less than 140s.  Pre-meal should be 's.  Both equally affected A1c.  Discussed importance of  glycemic control to prevent complications of diabetes  -Discussed complications of diabetes include retinopathy, neuropathy, nephropathy and cardiovascular disease  -Discussed ABCs of DM  -Discussed importance of SBGM  -Discussed importance of low CHO diet, recommend 45gm per meal or 135gm per day  -FU with optho        Don't touch tip of bottle to eyes nor with hands. Don't rub eyes. Use separate towels. Wash towels and wash cloths with bleach if can or hot water. Throw away contact lenses if can and all make up. If eyes get worse or new symptoms, go to ER immediately. Was hands thoroughly. Don't pry eyes open, Use moist towel and slowly open eyes.      Orders Placed This Encounter   Procedures    Vitamin D       Meds This Visit:  Requested Prescriptions     Signed Prescriptions Disp Refills    glipiZIDE 2.5 MG Oral Tab 60 tablet 1     Sig: Take 2.5 mg by mouth every 12 (twelve) hours. 1 po qam breakfest and 1/2 pre dinner.    albuterol (2.5 MG/3ML) 0.083% Inhalation Nebu Soln 60 each 3     Sig: Take 3 mL (2.5 mg total) by nebulization in the morning and 3 mL (2.5 mg total) before bedtime.    Cholecalciferol (VITAMIN D3) 1.25 MG (45578 UT) Oral Cap 4 capsule 4     Si po q2week.       Imaging & Referrals:  OPHTHALMOLOGY - EXTERNAL  OPHTHALMOLOGY - INTERNAL      Has set nayely't.

## 2025-06-23 ENCOUNTER — HOME HEALTH CHARGES (OUTPATIENT)
Dept: INTERNAL MEDICINE CLINIC | Facility: CLINIC | Age: 85
End: 2025-06-23

## 2025-06-23 DIAGNOSIS — D62 ACUTE BLOOD LOSS ANEMIA: ICD-10-CM

## 2025-06-23 DIAGNOSIS — N18.31 STAGE 3A CHRONIC KIDNEY DISEASE (HCC): Chronic | ICD-10-CM

## 2025-06-23 DIAGNOSIS — K55.21 AVM (ARTERIOVENOUS MALFORMATION) OF SMALL BOWEL, ACQUIRED WITH HEMORRHAGE: Primary | ICD-10-CM

## 2025-06-23 DIAGNOSIS — J43.8 OTHER EMPHYSEMA (HCC): ICD-10-CM

## 2025-06-26 DIAGNOSIS — D50.0 IRON DEFICIENCY ANEMIA DUE TO CHRONIC BLOOD LOSS: ICD-10-CM

## 2025-06-26 DIAGNOSIS — K55.21 AVM (ARTERIOVENOUS MALFORMATION) OF SMALL BOWEL, ACQUIRED WITH HEMORRHAGE: Primary | ICD-10-CM

## 2025-06-27 ENCOUNTER — OFFICE VISIT (OUTPATIENT)
Age: 85
End: 2025-06-27
Attending: INTERNAL MEDICINE
Payer: MEDICARE

## 2025-06-27 ENCOUNTER — NURSE ONLY (OUTPATIENT)
Age: 85
End: 2025-06-27
Attending: INTERNAL MEDICINE
Payer: MEDICARE

## 2025-06-27 VITALS
RESPIRATION RATE: 18 BRPM | WEIGHT: 137.63 LBS | TEMPERATURE: 97 F | SYSTOLIC BLOOD PRESSURE: 191 MMHG | HEART RATE: 80 BPM | OXYGEN SATURATION: 95 % | BODY MASS INDEX: 25.99 KG/M2 | HEIGHT: 61 IN | DIASTOLIC BLOOD PRESSURE: 97 MMHG

## 2025-06-27 VITALS — OXYGEN SATURATION: 96 %

## 2025-06-27 DIAGNOSIS — K90.9 MALABSORPTION OF IRON (HCC): ICD-10-CM

## 2025-06-27 DIAGNOSIS — K55.21 AVM (ARTERIOVENOUS MALFORMATION) OF SMALL BOWEL, ACQUIRED WITH HEMORRHAGE: ICD-10-CM

## 2025-06-27 DIAGNOSIS — D50.0 IRON DEFICIENCY ANEMIA DUE TO CHRONIC BLOOD LOSS: ICD-10-CM

## 2025-06-27 DIAGNOSIS — D50.0 IRON DEFICIENCY ANEMIA DUE TO CHRONIC BLOOD LOSS: Primary | ICD-10-CM

## 2025-06-27 LAB
ANTIBODY SCREEN: NEGATIVE
BASOPHILS # BLD AUTO: 0.04 X10(3) UL (ref 0–0.2)
BASOPHILS NFR BLD AUTO: 0.5 %
DEPRECATED HBV CORE AB SER IA-ACNC: 84 NG/ML (ref 50–306)
DEPRECATED RDW RBC AUTO: 57.9 FL (ref 35.1–46.3)
EOSINOPHIL # BLD AUTO: 0.21 X10(3) UL (ref 0–0.7)
EOSINOPHIL NFR BLD AUTO: 2.6 %
ERYTHROCYTE [DISTWIDTH] IN BLOOD BY AUTOMATED COUNT: 17.4 % (ref 11–15)
HCT VFR BLD AUTO: 29.4 % (ref 35–48)
HGB BLD-MCNC: 9.1 G/DL (ref 12–16)
IMM GRANULOCYTES # BLD AUTO: 0.03 X10(3) UL (ref 0–1)
IMM GRANULOCYTES NFR BLD: 0.4 %
IRON SATN MFR SERPL: 20 % (ref 15–50)
IRON SERPL-MCNC: 53 UG/DL (ref 50–170)
LYMPHOCYTES # BLD AUTO: 1.14 X10(3) UL (ref 1–4)
LYMPHOCYTES NFR BLD AUTO: 14.2 %
MCH RBC QN AUTO: 28.1 PG (ref 26–34)
MCHC RBC AUTO-ENTMCNC: 31 G/DL (ref 31–37)
MCV RBC AUTO: 90.7 FL (ref 80–100)
MONOCYTES # BLD AUTO: 0.6 X10(3) UL (ref 0.1–1)
MONOCYTES NFR BLD AUTO: 7.5 %
NEUTROPHILS # BLD AUTO: 5.99 X10 (3) UL (ref 1.5–7.7)
NEUTROPHILS # BLD AUTO: 5.99 X10(3) UL (ref 1.5–7.7)
NEUTROPHILS NFR BLD AUTO: 74.8 %
PLATELET # BLD AUTO: 241 10(3)UL (ref 150–450)
RBC # BLD AUTO: 3.24 X10(6)UL (ref 3.8–5.3)
RH BLOOD TYPE: POSITIVE
TOTAL IRON BINDING CAPACITY: 266 UG/DL (ref 250–425)
TRANSFERRIN SERPL-MCNC: 202 MG/DL (ref 250–380)
WBC # BLD AUTO: 8 X10(3) UL (ref 4–11)

## 2025-06-27 NOTE — PROGRESS NOTES
[facility] Progress Note    Patient Name: Chester Bautista   YOB: 1940   Medical Record Number: [id number]   [facility] [id number]  Date: 6/27/25    Chief Complaint: Recent inpatient visit for GI bleed, melena, anemia, hypoemia with history of BUZZ, hx of PE and AVM      Oncology History:  85 year old with chronic iron deficiency anemia in the setting of bleeding AVMs s/p repeat endoscopic treatment, Iv iron treatment and recent RBC transfusion for hgb of 6 on 5/2022. EGD/CLN Dr Garcia 7/2022 noted for 2 AVM in duodenum and 1 AVM in the colon s/p treatment.  There is ongoing concern for more AVMs as well need for chronic monitoring, infusions etc.      History is noted for RA managed by Dr Goodrich, hepatitis B core antibody positivity, and remote history of breast cancer (records not available).     Pt had an infusion 2/2023 injectafer.   5/19/23: reported to ED due to rectal bleeding and abd pain, has CLN planned.    In early 2/24 rec for ER eval in light of the critically HTN. She was treated for pneumonia. Prudencio was then found to have PE and GI bleeding. She was treated with IV heparin and required cauterization twice for GI bleeding, so never started on Apixaban and held from anticoagulation.    Received IV iron in 4/24  Prudencio underwent bidirectional scoping with Dr. Aundrea Garcia in GI in June 2023 were several areas of AVM were cauterized.     Getting monthly octreotide with Dr. Garcia    She last completed IV iron 3 months ago 10/9-10/31/24 (Venofer)  Last IV iron venofer 5/23 and 5/30/25      Interim  Patient here with daughter Brea for planned follow up. Pt was previously on medical mgmt with octreotide monthly, now scheduled with [facility] to discuss balloon enteroscopy.    Continues with fatigue, denies worsening sob, pica for ice. She denies bruising, blood loss from any orifice that she can see.       Past Medical History: history of breast cancer, BUZZ, bleeding AVM, diabetes,  HTN, HLD, RA  Past Surgical History: Hysterectomy 1972, IR aneurysm repair, right lumpectomy, knee replacements  Family History: neg for malignancy  Social History: lives with naif, able to do all ADLs, no smoking or ETOH    Current Outpatient Medications:     glipiZIDE 2.5 MG Oral Tab, Take 2.5 mg by mouth every 12 (twelve) hours. 1 po qam breakfest and 1/2 pre dinner., Disp: 60 tablet, Rfl: 1    albuterol (2.5 MG/3ML) 0.083% Inhalation Nebu Soln, Take 3 mL (2.5 mg total) by nebulization in the morning and 3 mL (2.5 mg total) before bedtime., Disp: 60 each, Rfl: 3    Cholecalciferol (VITAMIN D3) 1.25 MG (54762 UT) Oral Cap, 1 po q2week., Disp: 4 capsule, Rfl: 4    FLUTICASONE-SALMETEROL 250-50 MCG/ACT Inhalation Aerosol Powder, Breath Activated, Inhale 1 puff into the lungs 2 (two) times daily., Disp: 60 each, Rfl: 0    hydrALAZINE 25 MG Oral Tab, Take 1 tablet (25 mg total) by mouth every 8 (eight) hours., Disp: 90 tablet, Rfl: 0    losartan 100 MG Oral Tab, Take 1 tablet (100 mg total) by mouth daily., Disp: 90 tablet, Rfl: 3    rOPINIRole 0.25 MG Oral Tab, Take 1 tablet (0.25 mg total) by mouth every evening., Disp: 90 tablet, Rfl: 1    carvedilol 25 MG Oral Tab, Take 1 tablet (25 mg total) by mouth 2 (two) times daily with meals., Disp: 180 tablet, Rfl: 3    pantoprazole 40 MG Oral Tab EC, Take 1 tablet (40 mg total) by mouth in the morning and 1 tablet (40 mg total) in the evening. Take before meals., Disp: 60 tablet, Rfl: 0    LANTUS SOLOSTAR 100 UNIT/ML Subcutaneous Solution Pen-injector, Inject 10 Units into the skin every morning., Disp: , Rfl:     benzonatate 200 MG Oral Cap, Take 1 capsule (200 mg total) by mouth 3 (three) times daily as needed for cough., Disp: 15 capsule, Rfl: 0    gabapentin 100 MG Oral Cap, Take 1 capsule (100 mg total) by mouth nightly., Disp: , Rfl:     insulin lispro 100 UNIT/ML Injection Solution, Inject 3 Units into the skin in the morning and 3 Units at noon and 3 Units in  the evening. Inject before meals. Sliding scale depending on blood sugar., Disp: , Rfl:     ipratropium 0.02 % Inhalation Solution, Take 2.5 mL (500 mcg total) by nebulization 2 (two) times daily., Disp: 187.5 mL, Rfl: 2    lidocaine-menthol 4-1 % External Patch, Place 1 patch onto the skin in the morning., Disp: , Rfl:     acetaminophen 325 MG Oral Tab, Take 2 tablets (650 mg total) by mouth every 6 (six) hours as needed., Disp: , Rfl:     rosuvastatin 20 MG Oral Tab, Take 1 tablet (20 mg total) by mouth nightly., Disp: 90 tablet, Rfl: 3    magnesium oxide 400 MG Oral Tab, Take 1 tablet (400 mg total) by mouth 2 (two) times daily. Per Dr. JENSEN Mata, Disp: , Rfl:     predniSONE 5 MG Oral Tab, Take 1 tablet (5 mg total) by mouth daily., Disp: 90 tablet, Rfl: 1    albuterol 108 (90 Base) MCG/ACT Inhalation Aero Soln, TAKE 2 PUFFS BY MOUTH EVERY 4 TO 6 HOURS AS NEEDED, Disp: 6.7 each, Rfl: 5    BD PEN NEEDLE KADEN 2ND GEN 32G X 4 MM Does not apply Misc, 1 EACH DAILY. USE DAILY WITH INSULIN, Disp: 100 each, Rfl: 1    montelukast 10 MG Oral Tab, Take 1 tablet (10 mg total) by mouth nightly., Disp: 90 tablet, Rfl: 3    Blood Glucose Monitoring Suppl (BLOOD GLUCOSE SYSTEM GILLES) Does not apply Kit, Dx. E11.9. Checks qam., Disp: 1 kit, Rfl: 0    Blood Glucose Monitoring Suppl w/Device Does not apply Kit, Dx. E11.9. Checks qam., Disp: 1 kit, Rfl: 0    Blood Gluc Meter Disp-Strips Does not apply Device, Dx. E11.9. Checks qam., Disp: 100 each, Rfl: 0    Accu-Chek FastClix Lancets Does not apply Misc, Check sugar once daily as directed (E11.42), Disp: 100 each, Rfl: 2    Blood Glucose Monitoring Suppl (ONETOUCH VERIO) w/Device Does not apply Kit, 1 each daily. Test 1x daily, Disp: 1 kit, Rfl: 0    OneTouch Delica Lancets 33G Does not apply Misc, 4 x daily, Disp: 100 each, Rfl: 1    Allergies:  Allergies   Allergen Reactions    Celebrex [Celecoxib] PALPITATIONS    Penicillins ITCHING and SWELLING    Amlodipine OTHER (SEE COMMENTS)      Calves Swelling     Metformin And Related UNKNOWN    Olmesartan UNKNOWN        Review of Systems:Oncology specific ROS negative except as per HPI    BP (!) 203/60 (BP Location: Left arm, Patient Position: Sitting, Cuff Size: adult)   Pulse 70   Temp 97.2 °F (36.2 °C) (Oral)   Resp 18   Ht 1.549 m (5' 1\")   Wt 62.4 kg (137 lb 9.6 oz)   SpO2 95%   BMI 26.00 kg/m²   Wt Readings from Last 6 Encounters:   06/27/25 62.4 kg (137 lb 9.6 oz)   06/18/25 61.3 kg (135 lb 3.2 oz)   06/13/25 63 kg (139 lb)   06/11/25 60.2 kg (132 lb 11.2 oz)   05/30/25 62.1 kg (137 lb)   05/29/25 62.3 kg (137 lb 6.4 oz)     PE  General: Patient is alert and oriented x 3, not in acute distress. Steady gait.  HEENT: EOMI, MMM, No LAD russell cervical or supraclavicular, neck supple  Chest: Clear BS to auscultation bilaterally, no wob  Abdomen: soft, non tender, non distended, +BSx4  Extremities: mild bilateral LE edema at the feet  Neurological: Strength is grossly intact. Moving all extremities. Gait appropriate.   Psych/Depression: Appropriate mood and affect          Lab Results   Component Value Date    WBC 8.0 06/27/2025    RBC 3.24 (L) 06/27/2025    HGB 9.1 (L) 06/27/2025    HCT 29.4 (L) 06/27/2025    MCV 90.7 06/27/2025    MCH 28.1 06/27/2025    MCHC 31.0 06/27/2025    RDW 17.4 (H) 06/27/2025    .0 06/27/2025     Lab Results   Component Value Date     (H) 06/11/2025    BUN 15 06/11/2025    BUNCREA 13.8 06/11/2025    CREATSERUM 1.09 (H) 06/11/2025    ANIONGAP 7 06/11/2025    GFRNAA 47 (L) 05/02/2022    GFRAA 54 (L) 05/02/2022    CA 8.9 06/11/2025    OSMOCALC 295 06/11/2025    ALKPHO 67 06/09/2025    AST 48 (H) 06/09/2025    ALT 25 06/09/2025    BILT 0.4 06/09/2025    TP 6.4 06/09/2025    ALB 3.5 06/09/2025    GLOBULIN 2.9 06/09/2025     06/11/2025    K 3.8 06/11/2025     06/11/2025    CO2 30.0 06/11/2025       Lab Results   Component Value Date    ROSALIE 84 06/27/2025     Lab Results   Component Value Date    IRON  53 06/27/2025     Lab Results   Component Value Date    IRON 53 06/27/2025    TRANSFERRIN 202 (L) 06/27/2025    TIBCP 266 06/27/2025    SAT 20 06/27/2025       Impression and Plan:  85 year old with chronic iron deficiency anemia in the setting of bleeding AVMs s/p repeat endoscopic treatment, Iv iron treatment and rbc tfx 5/2022,    1.) BUZZ from AVM's    - previously CAPUTO has been a hallmark for symptomatic anemia for her, also has worsening fatigue and some lightheadedness  --pt received venofer in 4/24   --pt has improved symptoms with monthly octreotide by Dr. Garcia in GI for continued blood loss;   -11/22/24 Last IV iron 10/9-10/31/24 (Venofer)  -completed IV iron 5/23 and 5/30/25 Venofer    -6/8-6/11/25 she was admitted with SOB and melena, GI bleed , anemia, hypoxemia. HGB 6.5, received 1 unit RBC transfusion, and IV iron.  -cont medical mgmt with octreotide monthly which is being ordered by GI  -scheduled with [facility] to discuss balloon enteroscopy 6/25/25.    -HGB improved at 9.1 today  -goal is to keep Prudencio's iron saturation over 25% as well as ferritin in normal range and over 100 would be great  -cont labs checks every 2 wks  -follow up in 4 weeks with a provider      2.) HTN  --better control, cont to f/u with her PCP  --poorly controlled today    3) History of PE  --found on 2/23/24; not on anticoagulation in light of recent GI bleeding   --IVC filter placed on 3/7/24 by IR; she was rec for IVC filter retreival in 3-6 mo by IR, so this was removed  in 6/24  --her PE was noted to be resolved by CT chest on 4/26      4.) COPD/pulm fibrosis  -recent hospitalization and pneumonia 10/22/24, now resolved  -followed by Dr. Parker, now has nebulizer for albuterol        MDM: Moderate Risk; ongoing likely GI bleeding from AVM's, HTN, history of PE, IVC filter placement        Eric Mohamud MD  [facility]  [address] [facility]  [place]  [address]  [phone]

## 2025-07-07 ENCOUNTER — NURSE ONLY (OUTPATIENT)
Age: 85
End: 2025-07-07

## 2025-07-07 DIAGNOSIS — M05.79 RHEUMATOID ARTHRITIS INVOLVING MULTIPLE SITES WITH POSITIVE RHEUMATOID FACTOR (HCC): Primary | ICD-10-CM

## 2025-07-07 NOTE — PROGRESS NOTES
Name and  verified. Pt aware she was to receive injection of Cimza. Injections given in Bilateral [place], and pt tolerated well. Possible local side effects discussed with pt. Pt aware to follow up in 4 weeks for next injection and to follow up with provider in 1 month after labs completed.

## 2025-07-09 ENCOUNTER — TELEPHONE (OUTPATIENT)
Dept: INTERNAL MEDICINE CLINIC | Facility: CLINIC | Age: 85
End: 2025-07-09

## 2025-07-09 DIAGNOSIS — K55.21 AVM (ARTERIOVENOUS MALFORMATION) OF SMALL BOWEL, ACQUIRED WITH HEMORRHAGE: ICD-10-CM

## 2025-07-09 DIAGNOSIS — E11.9 TYPE 2 DIABETES MELLITUS WITHOUT RETINOPATHY (HCC): Primary | ICD-10-CM

## 2025-07-09 DIAGNOSIS — E83.52 HYPERCALCEMIA: ICD-10-CM

## 2025-07-09 DIAGNOSIS — D50.0 IRON DEFICIENCY ANEMIA DUE TO CHRONIC BLOOD LOSS: Primary | ICD-10-CM

## 2025-07-10 ENCOUNTER — NURSE ONLY (OUTPATIENT)
Age: 85
End: 2025-07-10
Attending: INTERNAL MEDICINE
Payer: MEDICARE

## 2025-07-10 ENCOUNTER — OFFICE VISIT (OUTPATIENT)
Dept: ENDOCRINOLOGY CLINIC | Facility: CLINIC | Age: 85
End: 2025-07-10

## 2025-07-10 VITALS
WEIGHT: 137 LBS | BODY MASS INDEX: 25.86 KG/M2 | HEART RATE: 72 BPM | HEIGHT: 61 IN | DIASTOLIC BLOOD PRESSURE: 72 MMHG | SYSTOLIC BLOOD PRESSURE: 154 MMHG

## 2025-07-10 DIAGNOSIS — E11.65 UNCONTROLLED TYPE 2 DIABETES MELLITUS WITH HYPERGLYCEMIA (HCC): Primary | ICD-10-CM

## 2025-07-10 DIAGNOSIS — D50.0 IRON DEFICIENCY ANEMIA DUE TO CHRONIC BLOOD LOSS: ICD-10-CM

## 2025-07-10 DIAGNOSIS — K55.21 AVM (ARTERIOVENOUS MALFORMATION) OF SMALL BOWEL, ACQUIRED WITH HEMORRHAGE: ICD-10-CM

## 2025-07-10 DIAGNOSIS — D50.9 IRON DEFICIENCY ANEMIA, UNSPECIFIED IRON DEFICIENCY ANEMIA TYPE: Primary | ICD-10-CM

## 2025-07-10 DIAGNOSIS — D62 ACUTE BLOOD LOSS ANEMIA: ICD-10-CM

## 2025-07-10 DIAGNOSIS — Q27.30 AVM (ARTERIOVENOUS MALFORMATION) (HCC): ICD-10-CM

## 2025-07-10 DIAGNOSIS — E11.9 TYPE 2 DIABETES MELLITUS WITHOUT RETINOPATHY (HCC): ICD-10-CM

## 2025-07-10 LAB
ALBUMIN SERPL-MCNC: 3.8 G/DL (ref 3.2–4.8)
ALBUMIN/GLOB SERPL: 1.2 {RATIO} (ref 1–2)
ALP LIVER SERPL-CCNC: 73 U/L (ref 55–142)
ALT SERPL-CCNC: 24 U/L (ref 10–49)
ANION GAP SERPL CALC-SCNC: 9 MMOL/L (ref 0–18)
ANTIBODY SCREEN: NEGATIVE
AST SERPL-CCNC: 30 U/L (ref ?–34)
BASOPHILS # BLD AUTO: 0.03 X10(3) UL (ref 0–0.2)
BASOPHILS # BLD AUTO: 0.04 X10(3) UL (ref 0–0.2)
BASOPHILS NFR BLD AUTO: 0.4 %
BASOPHILS NFR BLD AUTO: 0.6 %
BILIRUB SERPL-MCNC: 0.3 MG/DL (ref 0.2–1.1)
BUN BLD-MCNC: 11 MG/DL (ref 9–23)
BUN/CREAT SERPL: 11.3 (ref 10–20)
CALCIUM BLD-MCNC: 8.9 MG/DL (ref 8.7–10.4)
CARTRIDGE LOT#: ABNORMAL NUMERIC
CHLORIDE SERPL-SCNC: 103 MMOL/L (ref 98–112)
CO2 SERPL-SCNC: 29 MMOL/L (ref 21–32)
CREAT BLD-MCNC: 0.97 MG/DL (ref 0.55–1.02)
DEPRECATED HBV CORE AB SER IA-ACNC: 52 NG/ML (ref 50–306)
DEPRECATED HBV CORE AB SER IA-ACNC: 56 NG/ML (ref 50–306)
DEPRECATED RDW RBC AUTO: 52.7 FL (ref 35.1–46.3)
DEPRECATED RDW RBC AUTO: 53.1 FL (ref 35.1–46.3)
EGFRCR SERPLBLD CKD-EPI 2021: 57 ML/MIN/1.73M2 (ref 60–?)
EOSINOPHIL # BLD AUTO: 0.16 X10(3) UL (ref 0–0.7)
EOSINOPHIL # BLD AUTO: 0.17 X10(3) UL (ref 0–0.7)
EOSINOPHIL NFR BLD AUTO: 2.3 %
EOSINOPHIL NFR BLD AUTO: 2.4 %
ERYTHROCYTE [DISTWIDTH] IN BLOOD BY AUTOMATED COUNT: 15.9 % (ref 11–15)
ERYTHROCYTE [DISTWIDTH] IN BLOOD BY AUTOMATED COUNT: 16.1 % (ref 11–15)
GLOBULIN PLAS-MCNC: 3.1 G/DL (ref 2–3.5)
GLUCOSE BLD-MCNC: 190 MG/DL (ref 70–99)
GLUCOSE BLOOD: 226
HCT VFR BLD AUTO: 29.6 % (ref 35–48)
HCT VFR BLD AUTO: 29.7 % (ref 35–48)
HEMOGLOBIN A1C: 6.2 % (ref 4.3–5.6)
HGB BLD-MCNC: 8.8 G/DL (ref 12–16)
HGB BLD-MCNC: 8.9 G/DL (ref 12–16)
IMM GRANULOCYTES # BLD AUTO: 0.02 X10(3) UL (ref 0–1)
IMM GRANULOCYTES # BLD AUTO: 0.03 X10(3) UL (ref 0–1)
IMM GRANULOCYTES NFR BLD: 0.3 %
IMM GRANULOCYTES NFR BLD: 0.4 %
IRON SATN MFR SERPL: 12 % (ref 15–50)
IRON SATN MFR SERPL: 12 % (ref 15–50)
IRON SERPL-MCNC: 33 UG/DL (ref 50–170)
IRON SERPL-MCNC: 34 UG/DL (ref 50–170)
LYMPHOCYTES # BLD AUTO: 1.68 X10(3) UL (ref 1–4)
LYMPHOCYTES # BLD AUTO: 1.68 X10(3) UL (ref 1–4)
LYMPHOCYTES NFR BLD AUTO: 24.2 %
LYMPHOCYTES NFR BLD AUTO: 24.5 %
MCH RBC QN AUTO: 26.7 PG (ref 26–34)
MCH RBC QN AUTO: 27.2 PG (ref 26–34)
MCHC RBC AUTO-ENTMCNC: 29.7 G/DL (ref 31–37)
MCHC RBC AUTO-ENTMCNC: 30 G/DL (ref 31–37)
MCV RBC AUTO: 89.7 FL (ref 80–100)
MCV RBC AUTO: 90.8 FL (ref 80–100)
MONOCYTES # BLD AUTO: 0.63 X10(3) UL (ref 0.1–1)
MONOCYTES # BLD AUTO: 0.67 X10(3) UL (ref 0.1–1)
MONOCYTES NFR BLD AUTO: 9.2 %
MONOCYTES NFR BLD AUTO: 9.6 %
NEUTROPHILS # BLD AUTO: 4.34 X10 (3) UL (ref 1.5–7.7)
NEUTROPHILS # BLD AUTO: 4.34 X10(3) UL (ref 1.5–7.7)
NEUTROPHILS # BLD AUTO: 4.36 X10 (3) UL (ref 1.5–7.7)
NEUTROPHILS # BLD AUTO: 4.36 X10(3) UL (ref 1.5–7.7)
NEUTROPHILS NFR BLD AUTO: 62.8 %
NEUTROPHILS NFR BLD AUTO: 63.3 %
OSMOLALITY SERPL CALC.SUM OF ELEC: 296 MOSM/KG (ref 275–295)
PLATELET # BLD AUTO: 271 10(3)UL (ref 150–450)
PLATELET # BLD AUTO: 282 10(3)UL (ref 150–450)
POTASSIUM SERPL-SCNC: 3 MMOL/L (ref 3.5–5.1)
PROT SERPL-MCNC: 6.9 G/DL (ref 5.7–8.2)
RBC # BLD AUTO: 3.27 X10(6)UL (ref 3.8–5.3)
RBC # BLD AUTO: 3.3 X10(6)UL (ref 3.8–5.3)
RH BLOOD TYPE: POSITIVE
SODIUM SERPL-SCNC: 141 MMOL/L (ref 136–145)
TEST STRIP LOT #: NORMAL NUMERIC
TOTAL IRON BINDING CAPACITY: 277 UG/DL (ref 250–425)
TOTAL IRON BINDING CAPACITY: 278 UG/DL (ref 250–425)
TRANSFERRIN SERPL-MCNC: 207 MG/DL (ref 250–380)
TRANSFERRIN SERPL-MCNC: 210 MG/DL (ref 250–380)
VIT D+METAB SERPL-MCNC: 39.6 NG/ML (ref 30–100)
WBC # BLD AUTO: 6.9 X10(3) UL (ref 4–11)
WBC # BLD AUTO: 7 X10(3) UL (ref 4–11)

## 2025-07-10 PROCEDURE — 1160F RVW MEDS BY RX/DR IN RCRD: CPT | Performed by: NURSE PRACTITIONER

## 2025-07-10 PROCEDURE — 83036 HEMOGLOBIN GLYCOSYLATED A1C: CPT | Performed by: NURSE PRACTITIONER

## 2025-07-10 PROCEDURE — 99214 OFFICE O/P EST MOD 30 MIN: CPT | Performed by: NURSE PRACTITIONER

## 2025-07-10 PROCEDURE — 3008F BODY MASS INDEX DOCD: CPT | Performed by: NURSE PRACTITIONER

## 2025-07-10 PROCEDURE — 3077F SYST BP >= 140 MM HG: CPT | Performed by: NURSE PRACTITIONER

## 2025-07-10 PROCEDURE — 82306 VITAMIN D 25 HYDROXY: CPT | Performed by: INTERNAL MEDICINE

## 2025-07-10 PROCEDURE — 95251 CONT GLUC MNTR ANALYSIS I&R: CPT | Performed by: NURSE PRACTITIONER

## 2025-07-10 PROCEDURE — 1159F MED LIST DOCD IN RCRD: CPT | Performed by: NURSE PRACTITIONER

## 2025-07-10 PROCEDURE — 3078F DIAST BP <80 MM HG: CPT | Performed by: NURSE PRACTITIONER

## 2025-07-10 PROCEDURE — 82947 ASSAY GLUCOSE BLOOD QUANT: CPT | Performed by: NURSE PRACTITIONER

## 2025-07-10 RX ORDER — OCTREOTIDE ACETATE 20 MG
20 KIT INTRAMUSCULAR ONCE
OUTPATIENT
Start: 2025-08-07

## 2025-07-10 RX ORDER — OCTREOTIDE ACETATE 20 MG
20 KIT INTRAMUSCULAR ONCE
Status: COMPLETED | OUTPATIENT
Start: 2025-07-10 | End: 2025-07-10

## 2025-07-10 RX ADMIN — OCTREOTIDE ACETATE 20 MG: 20 MG KIT INTRAMUSCULAR at 15:30:00

## 2025-07-10 NOTE — PROGRESS NOTES
Name: Chester Bautista  YOB: 1940  Report Period: 06/12/2025 - 07/09/2025 (28 days)  Generated: 07/10/2025  Time CGM Active: 87%      Glucose Statistics and Targets  Average Glucose: 183 mg/dL  Glucose Management Indicator (GMI): 7.7%  Glucose Variability (%CV): 31.6%  Target Range: 70 - 180 mg/dL      Time in Ranges  Very High: >250 mg/dL --- 14%  High: 181 - 250 mg/dL --- 36%  Target Range: 70 - 180 mg/dL --- 49%  Low: 54 - 69 mg/dL --- 1%  Very Low: <54 mg/dL --- 0%

## 2025-07-10 NOTE — PROGRESS NOTES
Name: Chester Bautista  Date: 7/10/2025    CHIEF COMPLAINT   Chief Complaint   Patient presents with    Diabetes     Patient is in to follow up on diabetes, A1c check up. Joselyn report downloaded       HISTORY OF PRESENT ILLNESS   Chester Bautista is a 85 year old female who presents for follow up on diabetes management. Patient is accompanied by her daughter Tommy today.   HbA1C: 6.2% at POC today. Previously was 5.6% on 4/10/2025.   Blood glucose is: 226 in clinic today.   Patient notes that she is starting to feel better. She was hospitalized from 6/8 to 6/11 with hypoxemia, GI hemorrhage, hypervolemia and acute pulmonary edema.   She has continues to try and follow a low carbohydrate diet. Continues to be active around the house. Using rolling walker for longer distance.     FAMILY HISTORY OF DIABETES  -paternal grandmother and paternal aunt.   DIABETES HISTORY  Diagnosed: had prediabetes since 2003; diagnosed with T2DM 11/7/21;   Prior HbA, C or glycohemoglobin were 7.2% on 11/7/2021;  7.7% 2/26/2022; 7.3% 5/2/2022; 6.0% 9/1/2022; 6.4% 3/21/2023; 8.1% 6/29/2023; 7.0% 10/18/2023; 9.5% on 2/2/2024; 9.0% 5/21/2024; 10.3% 7/19/2024; 10.3% 8/27/2024; 10.3% on 10/14/2024; 7.6% 11/13/2024; 9.7% 1/16/2025; 5.6% 4/10/2025; 6.2% at POC today;     PREVIOUS MEDICATION FOR DM:  Metformin -d/c'ed due to allergic reaction - swelling to bottom of lip and tongue itching; also vomiting.   - 70/30 insulin: d/c'ed due to lack of glycemic control     CURRENT MEDICATIONS FOR DM:  Glipizide 2.5mg before breakfast    1.25mg before dinner   Lantus 10 units nightly     HOME GLUCOSE READINGS:   She has been using freestyle joselyn 3 CGM. Download was reviewed with patient today.  Testing BG 3-4x daily.       Report Period: 06/12/2025 - 07/09/2025 (28 days)  Generated: 07/10/2025  Time CGM Active: 87%        Glucose Statistics and Targets  Average Glucose: 183 mg/dL  Glucose Management Indicator (GMI): 7.7%  Glucose  Variability (%CV): 31.6%  Target Range: 70 - 180 mg/dL        Time in Ranges  Very High: >250 mg/dL --- 14%  High: 181 - 250 mg/dL --- 36%  Target Range: 70 - 180 mg/dL --- 49%  Low: 54 - 69 mg/dL --- 1%  Very Low: <54 mg/dL --- 0%          Continuous Glucose Monitoring Interpretation    Chester Bautista has undergone continuous glucose monitoring with the personal Freestyle van 3 continuous glucose monitor.  The blood glucose tracings were evaluated for two weeks prior to office visit. Her blood glucose tracings demonstrated postprandial hyperglycemia at meal times, however normal or below goal glucose readings overnight. As a result of her testing her medications were adjusted per below.     HISTORY OF DIABETES COMPLICATIONS:  History of Retinopathy: no  - last eye exam within the last 12 months: yes  History of Neuropathy: no   History of Nephropathy: no     ASSOCIATED COMPLICATIONS:   HTN: yes   Hyperlipidemia: yes   Cardiovascular Disease: no   Peripheral Vascular Disease: no     DIETARY COMPLIANCE:  Good; has been following a low carb diet   - eating less snacks and sweets     EXERCISE:   No     Polyuria, polyphagia, polydipsia: no   Paresthesias: no   Blurred vision: no   Recent steroids, illness or infections: yes  - on prednisone for pain - RA; has been taking since 7-9/2021    REVIEW OF SYSTEMS  Constitutional: Negative for: weight change, fever, fatigue, cold/heat intolerance  Eyes: Negative for:  Visual changes, proptosis, blurring  ENT: Negative for:  dysphagia, neck swelling, dysphonia  Respiratory: Negative for: hemoptysis, shortness of breath, cough, or dyspnea.  Cardiovascular: Negative for:  chest pain, chest discomfort, palpitations  GI: Negative for:  abdominal pain, nausea, vomiting, diarrhea, heartburn, constipation  Neurology: Negative for: headache, dizziness, syncope, numbness/tingling, or weakness.   Genito-Urinary: Negative for: dysuria, frequency or hematuria    Hematology/Lymphatics: Negative for: bruising, easy bleeding, lower extremity edema  Skin: Negative for: rash, blister, infection or ulcers.  Endocrine: Negative for: polyuria, polydipsia. No osteoporosis. No thyroid disease.     MEDICATIONS:     Current Outpatient Medications:     glipiZIDE 2.5 MG Oral Tab, Take 2.5 mg by mouth every 12 (twelve) hours. 1 po qam breakfest and 1/2 pre dinner., Disp: 60 tablet, Rfl: 1    albuterol (2.5 MG/3ML) 0.083% Inhalation Nebu Soln, Take 3 mL (2.5 mg total) by nebulization in the morning and 3 mL (2.5 mg total) before bedtime., Disp: 60 each, Rfl: 3    Cholecalciferol (VITAMIN D3) 1.25 MG (43506 UT) Oral Cap, 1 po q2week., Disp: 4 capsule, Rfl: 4    FLUTICASONE-SALMETEROL 250-50 MCG/ACT Inhalation Aerosol Powder, Breath Activated, Inhale 1 puff into the lungs 2 (two) times daily., Disp: 60 each, Rfl: 0    hydrALAZINE 25 MG Oral Tab, Take 1 tablet (25 mg total) by mouth every 8 (eight) hours., Disp: 90 tablet, Rfl: 0    losartan 100 MG Oral Tab, Take 1 tablet (100 mg total) by mouth daily., Disp: 90 tablet, Rfl: 3    rOPINIRole 0.25 MG Oral Tab, Take 1 tablet (0.25 mg total) by mouth every evening., Disp: 90 tablet, Rfl: 1    carvedilol 25 MG Oral Tab, Take 1 tablet (25 mg total) by mouth 2 (two) times daily with meals., Disp: 180 tablet, Rfl: 3    pantoprazole 40 MG Oral Tab EC, Take 1 tablet (40 mg total) by mouth in the morning and 1 tablet (40 mg total) in the evening. Take before meals., Disp: 60 tablet, Rfl: 0    LANTUS SOLOSTAR 100 UNIT/ML Subcutaneous Solution Pen-injector, Inject 10 Units into the skin every morning., Disp: , Rfl:     benzonatate 200 MG Oral Cap, Take 1 capsule (200 mg total) by mouth 3 (three) times daily as needed for cough., Disp: 15 capsule, Rfl: 0    gabapentin 100 MG Oral Cap, Take 1 capsule (100 mg total) by mouth nightly., Disp: , Rfl:     ipratropium 0.02 % Inhalation Solution, Take 2.5 mL (500 mcg total) by nebulization 2 (two) times daily.,  Disp: 187.5 mL, Rfl: 2    lidocaine-menthol 4-1 % External Patch, Place 1 patch onto the skin in the morning., Disp: , Rfl:     acetaminophen 325 MG Oral Tab, Take 2 tablets (650 mg total) by mouth every 6 (six) hours as needed., Disp: , Rfl:     rosuvastatin 20 MG Oral Tab, Take 1 tablet (20 mg total) by mouth nightly., Disp: 90 tablet, Rfl: 3    magnesium oxide 400 MG Oral Tab, Take 1 tablet (400 mg total) by mouth 2 (two) times daily. Per Dr. JENSEN Mata, Disp: , Rfl:     predniSONE 5 MG Oral Tab, Take 1 tablet (5 mg total) by mouth daily., Disp: 90 tablet, Rfl: 1    albuterol 108 (90 Base) MCG/ACT Inhalation Aero Soln, TAKE 2 PUFFS BY MOUTH EVERY 4 TO 6 HOURS AS NEEDED, Disp: 6.7 each, Rfl: 5    BD PEN NEEDLE KADEN 2ND GEN 32G X 4 MM Does not apply Misc, 1 EACH DAILY. USE DAILY WITH INSULIN, Disp: 100 each, Rfl: 1    montelukast 10 MG Oral Tab, Take 1 tablet (10 mg total) by mouth nightly., Disp: 90 tablet, Rfl: 3    Blood Glucose Monitoring Suppl (BLOOD GLUCOSE SYSTEM GILLES) Does not apply Kit, Dx. E11.9. Checks qam., Disp: 1 kit, Rfl: 0    Blood Glucose Monitoring Suppl w/Device Does not apply Kit, Dx. E11.9. Checks qam., Disp: 1 kit, Rfl: 0    Blood Gluc Meter Disp-Strips Does not apply Device, Dx. E11.9. Checks qam., Disp: 100 each, Rfl: 0    Accu-Chek FastClix Lancets Does not apply Misc, Check sugar once daily as directed (E11.42), Disp: 100 each, Rfl: 2    Blood Glucose Monitoring Suppl (ONETOUCH VERIO) w/Device Does not apply Kit, 1 each daily. Test 1x daily, Disp: 1 kit, Rfl: 0    OneTouch Delica Lancets 33G Does not apply Misc, 4 x daily, Disp: 100 each, Rfl: 1    insulin lispro 100 UNIT/ML Injection Solution, Inject 3 Units into the skin in the morning and 3 Units at noon and 3 Units in the evening. Inject before meals. Sliding scale depending on blood sugar. (Patient not taking: Reported on 7/10/2025), Disp: , Rfl:     ALLERGIES:   Allergies   Allergen Reactions    Celebrex [Celecoxib] PALPITATIONS     Penicillins ITCHING and SWELLING    Amlodipine OTHER (SEE COMMENTS)     Calves Swelling     Metformin And Related UNKNOWN    Olmesartan UNKNOWN       SOCIAL HISTORY:   Social History     Socioeconomic History    Marital status:    Tobacco Use    Smoking status: Former     Current packs/day: 0.00     Types: Cigarettes     Quit date:      Years since quittin.5     Passive exposure: Past    Smokeless tobacco: Never    Tobacco comments:     [id number]   Vaping Use    Vaping status: Never Used   Substance and Sexual Activity    Alcohol use: Never    Drug use: Never     PAST MEDICAL HISTORY:   Past Medical History:    Anxiety state    Cancer (HCC)    breast cancer 2018    Cerebellar stroke (HCC)    Chronic obstructive pulmonary disease, unspecified (HCC)    Chronic obstructive pulmonary disease, unspecified (HCC)    COPD (chronic obstructive pulmonary disease) (HCC)    Cystic thyroid nodule    Diabetes (HCC)    Diabetes mellitus (HCC)    Essential hypertension    Exposure to medical diagnostic radiation    High blood pressure    High cholesterol    History of blood transfusion    low hemoglobin    Osteoarthritis    PONV (postoperative nausea and vomiting)    Pulmonary embolism (HCC)    Pulmonary emphysema (HCC)    Rheumatoid arthritis (HCC)    Stroke (HCC)       PAST SURGICAL HISTORY:   Past Surgical History:   Procedure Laterality Date    Cataract extraction w/  intraocular lens implant Bilateral 2017    Dr in [facility]     Colonoscopy      Colonoscopy N/A 2022    Procedure: COLONOSCOPY;  Surgeon: Mikey Garcia MD;  Location: [facility] ENDOSCOPY    Colonoscopy N/A 06/15/2023    Procedure: COLONOSCOPY;  Surgeon: Mikey Garcia MD;  Location: [facility] ENDOSCOPY    Correct bunion,simple      right foot      Egd  2023    Dr. Garcia; Duodenal AVM's x4 cauterized    Hysterectomy      , no abnormal Paps, due to heavy bleeding with fibroids.  Not sure if it had bilateral salpingo-oophorectomy.     Ir aneurysm repairm  2010    Computer assisted volumetric craniofomy with clipping of anterior cerebral artery.    Lumpectomy right      Radiation right      Rotator cuff repair      1993    Total knee replacement Right 2010    Total knee replacement Left 07/02/2015    Transoral incisionless fundoplication - internal  01/25/2012 Fredy fundoplication at [facility] Dr. Fournier.    Fredy fundoplication at [facility] Dr. Fournier.    Trigger finger release      left hand  2005    Yag capsulotomy - ou - both eyes Bilateral 09/2021    done in [place]       PHYSICAL EXAM:   Vitals:    07/10/25 1342 07/10/25 1441   BP: 152/65 154/72   Pulse: 72    Weight: 137 lb (62.1 kg)    Height: 5' 1\" (1.549 m)      BMI:   Body mass index is 25.89 kg/m².    General Appearance:  alert, well developed, in no acute distress  Nutritional:  no extreme weight gain or loss  Head: Atraumatic  Eyes:  normal conjunctivae, sclera., normal sclera and normal pupils  Throat/Neck: normal sound to voice. Normal hearing, normal speech  Back: no kyphosis  Respiratory:  Speaking in full sentences, non-labored. no increased work of breathing, no audible wheezing    Skin:  normal moisture and skin texture, no visible lesions  Hair and nails: normal scalp hair  Hematologic:  no excessive bruising  Neuro: motor grossly intact, moving all extremities without difficulty  Psychiatric:  oriented to time, self, and place  Extremities: no obvious extremity swelling, no lesions    LABS: Pertinent labs reviewed    ASSESSMENT/PLAN:    -Reviewed with patient the pathogenesis of diabetes, clinical significance of A1c, and common complications such as: microvascular, macrovascular and diabetic ketoacidosis. Patient verbalizes understanding of the importance of glycemic control and the goals of therapy.     1.Type 2 Diabetes Mellitus, uncontrolled with long term insulin use   -LAB DATA  HbA,C: 6.2% today   a) Medications  - continue with Glipizide  2.5mg daily before first meal    --> may increase dose to 5mg if eating a higher carb meal than usual  - hold glipizide with dinner   - start taking Lantus 10 units before dinner     - will consider adding trulicity if postprandial hyperglycemia continues.   - discussed to continue to follow a low carb diet   - discussed to continue to stay active as currently doing   -reviewed target goal BG readings and A1C  -reviewed when to call and notify me of abnormal BG readings.     b) No nephropathy: GFR: 57 on 7/10/2025 and urine MA: 520.8 on 2025  c) UTD with optho  D)foot exam: normal on 2025  e) cont using freestyle van 3 cgm   f) Life style changes reviewed    2.dyslipidemia   -LDL: 32 and Tri on 3/27/2025  - rosuvastatin 20mg at bedtime       RTC in 3 months    Patient instructed to call sooner if they develop Blood glucose readings <75 and/or if they have readings persistently >200.     The risks and benefits of my recommendations were discussed with the patient today. questions were also answered to the best of my knowledge. Patient verbalizes understanding of these issues and agrees to the plan.    7/10/2025  BILL Swain

## 2025-07-10 NOTE — PATIENT INSTRUCTIONS
A1C: 6.2% today --> previously was 5.6% on 4/10/2025  Blood glucose: 226 in clinic today    Medications:   - continue with Glipizide 2.5mg daily before first meal    --> may increase dose to 5mg if eating a higher carb meal than usual  - hold glipizide with dinner   - start taking Lantus 10 units before dinner

## 2025-07-18 ENCOUNTER — OFFICE VISIT (OUTPATIENT)
Age: 85
End: 2025-07-18
Attending: INTERNAL MEDICINE
Payer: MEDICARE

## 2025-07-18 VITALS
TEMPERATURE: 98 F | HEART RATE: 78 BPM | RESPIRATION RATE: 18 BRPM | SYSTOLIC BLOOD PRESSURE: 168 MMHG | DIASTOLIC BLOOD PRESSURE: 73 MMHG | OXYGEN SATURATION: 92 %

## 2025-07-18 DIAGNOSIS — K55.21 AVM (ARTERIOVENOUS MALFORMATION) OF SMALL BOWEL, ACQUIRED WITH HEMORRHAGE: ICD-10-CM

## 2025-07-18 DIAGNOSIS — D50.0 IRON DEFICIENCY ANEMIA DUE TO CHRONIC BLOOD LOSS: Primary | ICD-10-CM

## 2025-07-18 DIAGNOSIS — K90.9 MALABSORPTION OF IRON (HCC): ICD-10-CM

## 2025-07-18 RX ORDER — SODIUM CHLORIDE 9 MG/ML
10 INJECTION, SOLUTION INTRAMUSCULAR; INTRAVENOUS; SUBCUTANEOUS ONCE
OUTPATIENT
Start: 2025-07-19

## 2025-07-18 NOTE — PROGRESS NOTES
Pt here for Venofer 200mg IVP . Pt denies any issues or concerns.   Port accessed, venofer given slow IVP via side port of a free flowing bag of 0.9NS.  Pt tolerated well.      Ordering Provider: Cade       Pt tolerated infusion without difficulty or complaint. Reviewed next apt date/time: [facility]      Education Record  Learner:  Patient and Family Member  Disease / Diagnosis: BUZZ  Barriers / Limitations:  None  Method:  Discussion  General Topics:  Plan of care reviewed  Outcome:  Shows understanding

## 2025-07-23 ENCOUNTER — OFFICE VISIT (OUTPATIENT)
Facility: LOCATION | Age: 85
End: 2025-07-23
Attending: INTERNAL MEDICINE
Payer: MEDICARE

## 2025-07-23 VITALS
RESPIRATION RATE: 18 BRPM | SYSTOLIC BLOOD PRESSURE: 128 MMHG | DIASTOLIC BLOOD PRESSURE: 62 MMHG | TEMPERATURE: 97 F | HEART RATE: 87 BPM | OXYGEN SATURATION: 92 %

## 2025-07-23 DIAGNOSIS — K90.9 MALABSORPTION OF IRON (HCC): ICD-10-CM

## 2025-07-23 DIAGNOSIS — K55.21 AVM (ARTERIOVENOUS MALFORMATION) OF SMALL BOWEL, ACQUIRED WITH HEMORRHAGE: ICD-10-CM

## 2025-07-23 DIAGNOSIS — D50.0 IRON DEFICIENCY ANEMIA DUE TO CHRONIC BLOOD LOSS: Primary | ICD-10-CM

## 2025-07-23 RX ORDER — SODIUM CHLORIDE 9 MG/ML
10 INJECTION, SOLUTION INTRAMUSCULAR; INTRAVENOUS; SUBCUTANEOUS ONCE
OUTPATIENT
Start: 2025-07-24

## 2025-07-23 NOTE — PROGRESS NOTES
Pt here for venofer 200 2/3 . Pt denies any issues or concerns. Has had iron previously and tolerated well. PAC accessed with good blood return.  Dose given as ordered-tolerated well.  PAC decannulated and pt dc'd without complaints, VSS.      Ordering Provider: Cade Woodard Exp: completion of 3/3     Pt tolerated infusion without difficulty or complaint. Reviewed next apt date/time: 7/25      Education Record  Learner:  Patient  Disease / Diagnosis: anemia  Barriers / Limitations:  None  Method:  Discussion  General Topics:  Medication, Side effects and symptom management, and Plan of care reviewed  Outcome:  Shows understanding

## 2025-07-23 NOTE — TELEPHONE ENCOUNTER
From: Anoop Iron  To: Vernal Sours. Keyonna Prabhakar MD  Sent: 9/12/2022 7:06 PM CDT  Subject: Glipizide meds    Hi Dr. Madhu Edwards is taking Glipizide 5mg once before her first meal, which may be around 12 to 1pm. She has not been taking the 1/2 in the evening. Milvia Martinez also prescribed Duloxetine for the Neuropathy in her feet.    Sincerely,  Lucho Spears Patient: Halima Al       MRN: [id number]      : 1949     Age: 75 y.o.  [address] [address]    Presenting Radiologists:     Providers: Mario Dahl MD    Priority of review: Cancer    Patient Transplant Status: Hepatology    Reason for presentation: Reassessment    Clinical Summary:   75-year-old woman with a new right liver lesion.  She has already had a biopsy of the mass the results of which are pending.  Her alpha fetoprotein is close to a 1000 in her CA 19-9 as above 200.     Imaging to be reviewed: CT scan 2025    HCC Treatment History:     Platelets:   Lab Results   Component Value Date/Time     2025 11:25 AM     2024 07:24 AM     Creatinine:   Lab Results   Component Value Date/Time    CREATININE 1.1 2025 11:25 AM     Bilirubin:   Lab Results   Component Value Date/Time    BILITOT 0.8 2025 11:25 AM    BILITOT 0.5 2024 07:24 AM     AFP Last 3 each if available:   Lab Results   Component Value Date/Time    .3 (H) 2025 11:25 AM       MELD: MELD 3.0: 10 at 2025 11:25 AM  MELD-Na: 9 at 2025 11:25 AM  Calculated from:  Serum Creatinine: 1.1 mg/dL at 2025 11:25 AM  Serum Sodium: 141 mmol/L (Using max of 137 mmol/L) at 2025 11:25 AM  Total Bilirubin: 0.8 mg/dL (Using min of 1 mg/dL) at 2025 11:25 AM  Serum Albumin: 4.2 g/dL (Using max of 3.5 g/dL) at 2025 11:25 AM  INR(ratio): 1.2 at 2025 11:25 AM  Age at listing (hypothetical): 75 years  Sex: Female at 2025 11:25 AM    Committee Discussion:    Plan:     Follow-up Provider: Dr MILLICENT Dahl

## 2025-07-24 ENCOUNTER — HOSPITAL ENCOUNTER (OUTPATIENT)
Dept: CT IMAGING | Facility: HOSPITAL | Age: 85
Discharge: HOME | End: 2025-07-24
Attending: Other
Payer: MEDICARE

## 2025-07-24 DIAGNOSIS — D50.0 IRON DEFICIENCY ANEMIA DUE TO CHRONIC BLOOD LOSS: Primary | ICD-10-CM

## 2025-07-24 DIAGNOSIS — I69.30 SEQUELA, POST-STROKE: ICD-10-CM

## 2025-07-24 DIAGNOSIS — K92.2 GASTROINTESTINAL HEMORRHAGE, UNSPECIFIED GASTROINTESTINAL HEMORRHAGE TYPE: ICD-10-CM

## 2025-07-24 PROCEDURE — 74175 CTA ABDOMEN W/CONTRAST: CPT | Performed by: OTHER

## 2025-07-24 PROCEDURE — 70496 CT ANGIOGRAPHY HEAD: CPT | Performed by: OTHER

## 2025-07-24 PROCEDURE — 70498 CT ANGIOGRAPHY NECK: CPT | Performed by: OTHER

## 2025-07-24 PROCEDURE — 71275 CT ANGIOGRAPHY CHEST: CPT | Performed by: OTHER

## 2025-07-25 ENCOUNTER — OFFICE VISIT (OUTPATIENT)
Facility: LOCATION | Age: 85
End: 2025-07-25
Attending: INTERNAL MEDICINE
Payer: MEDICARE

## 2025-07-25 VITALS
OXYGEN SATURATION: 93 % | DIASTOLIC BLOOD PRESSURE: 56 MMHG | SYSTOLIC BLOOD PRESSURE: 166 MMHG | RESPIRATION RATE: 18 BRPM | TEMPERATURE: 98 F | HEART RATE: 79 BPM

## 2025-07-25 VITALS
RESPIRATION RATE: 18 BRPM | TEMPERATURE: 98 F | SYSTOLIC BLOOD PRESSURE: 166 MMHG | HEART RATE: 79 BPM | DIASTOLIC BLOOD PRESSURE: 56 MMHG | OXYGEN SATURATION: 93 %

## 2025-07-25 DIAGNOSIS — D50.0 IRON DEFICIENCY ANEMIA DUE TO CHRONIC BLOOD LOSS: ICD-10-CM

## 2025-07-25 DIAGNOSIS — K90.9 MALABSORPTION OF IRON (HCC): ICD-10-CM

## 2025-07-25 DIAGNOSIS — E87.6 HYPOKALEMIA: ICD-10-CM

## 2025-07-25 DIAGNOSIS — K55.21 AVM (ARTERIOVENOUS MALFORMATION) OF SMALL BOWEL, ACQUIRED WITH HEMORRHAGE: ICD-10-CM

## 2025-07-25 DIAGNOSIS — K92.2 GASTROINTESTINAL HEMORRHAGE, UNSPECIFIED GASTROINTESTINAL HEMORRHAGE TYPE: ICD-10-CM

## 2025-07-25 DIAGNOSIS — I10 PRIMARY HYPERTENSION: Primary | ICD-10-CM

## 2025-07-25 DIAGNOSIS — M05.79 RHEUMATOID ARTHRITIS INVOLVING MULTIPLE SITES WITH POSITIVE RHEUMATOID FACTOR (HCC): ICD-10-CM

## 2025-07-25 DIAGNOSIS — Z51.81 MEDICATION MONITORING ENCOUNTER: ICD-10-CM

## 2025-07-25 DIAGNOSIS — D50.0 IRON DEFICIENCY ANEMIA DUE TO CHRONIC BLOOD LOSS: Primary | ICD-10-CM

## 2025-07-25 LAB
ANION GAP SERPL CALC-SCNC: 10 MMOL/L (ref 0–18)
ANTIBODY SCREEN: NEGATIVE
BASOPHILS # BLD AUTO: 0.06 X10(3) UL (ref 0–0.2)
BASOPHILS NFR BLD AUTO: 0.9 %
BUN BLD-MCNC: 14 MG/DL (ref 9–23)
BUN/CREAT SERPL: 11.5 (ref 10–20)
CALCIUM BLD-MCNC: 9.3 MG/DL (ref 8.7–10.4)
CHLORIDE SERPL-SCNC: 104 MMOL/L (ref 98–112)
CO2 SERPL-SCNC: 25 MMOL/L (ref 21–32)
CREAT BLD-MCNC: 1.22 MG/DL (ref 0.55–1.02)
DEPRECATED RDW RBC AUTO: 58.5 FL (ref 35.1–46.3)
EGFRCR SERPLBLD CKD-EPI 2021: 43 ML/MIN/1.73M2 (ref 60–?)
EOSINOPHIL # BLD AUTO: 0.27 X10(3) UL (ref 0–0.7)
EOSINOPHIL NFR BLD AUTO: 3.9 %
ERYTHROCYTE [DISTWIDTH] IN BLOOD BY AUTOMATED COUNT: 18 % (ref 11–15)
FASTING STATUS PATIENT QL REPORTED: NO
GLUCOSE BLD-MCNC: 313 MG/DL (ref 70–99)
HCT VFR BLD AUTO: 30.1 % (ref 35–48)
HGB BLD-MCNC: 9 G/DL (ref 12–16)
IMM GRANULOCYTES # BLD AUTO: 0.02 X10(3) UL (ref 0–1)
IMM GRANULOCYTES NFR BLD: 0.3 %
LYMPHOCYTES # BLD AUTO: 1.54 X10(3) UL (ref 1–4)
LYMPHOCYTES NFR BLD AUTO: 22.4 %
MCH RBC QN AUTO: 27.6 PG (ref 26–34)
MCHC RBC AUTO-ENTMCNC: 29.9 G/DL (ref 31–37)
MCV RBC AUTO: 92.3 FL (ref 80–100)
MONOCYTES # BLD AUTO: 0.72 X10(3) UL (ref 0.1–1)
MONOCYTES NFR BLD AUTO: 10.5 %
NEUTROPHILS # BLD AUTO: 4.25 X10 (3) UL (ref 1.5–7.7)
NEUTROPHILS # BLD AUTO: 4.25 X10(3) UL (ref 1.5–7.7)
NEUTROPHILS NFR BLD AUTO: 62 %
OSMOLALITY SERPL CALC.SUM OF ELEC: 300 MOSM/KG (ref 275–295)
PLATELET # BLD AUTO: 203 10(3)UL (ref 150–450)
POTASSIUM SERPL-SCNC: 3.6 MMOL/L (ref 3.5–5.1)
RBC # BLD AUTO: 3.26 X10(6)UL (ref 3.8–5.3)
RH BLOOD TYPE: POSITIVE
SODIUM SERPL-SCNC: 139 MMOL/L (ref 136–145)
WBC # BLD AUTO: 6.9 X10(3) UL (ref 4–11)

## 2025-07-25 RX ORDER — SODIUM CHLORIDE 9 MG/ML
10 INJECTION, SOLUTION INTRAMUSCULAR; INTRAVENOUS; SUBCUTANEOUS ONCE
OUTPATIENT
Start: 2025-07-25

## 2025-07-25 NOTE — PROGRESS NOTES
[facility] Progress Note    Patient Name: Chester Bautista   YOB: 1940   Medical Record Number: [id number]   [facility] [id number]  Date: 7/25/25    Chief Complaint: Recent inpatient visit for GI bleed, melena, anemia, hypoemia with history of BUZZ, hx of PE and AVM      Oncology History:  85 year old with chronic iron deficiency anemia in the setting of bleeding AVMs s/p repeat endoscopic treatment, Iv iron treatment and recent RBC transfusion for hgb of 6 on 5/2022. EGD/CLN Dr Garcia 7/2022 noted for 2 AVM in duodenum and 1 AVM in the colon s/p treatment.  There is ongoing concern for more AVMs as well need for chronic monitoring, infusions etc.      History is noted for RA managed by Dr Goodrich, hepatitis B core antibody positivity, and remote history of breast cancer (records not available).     Pt had an infusion 2/2023 injectafer.   5/19/23: reported to ED due to rectal bleeding and abd pain, has CLN planned.    In early 2/24 rec for ER eval in light of the critically HTN. She was treated for pneumonia. Prudencio was then found to have PE and GI bleeding. She was treated with IV heparin and required cauterization twice for GI bleeding, so never started on Apixaban and held from anticoagulation.    Received IV iron in 4/24  Prudencio underwent bidirectional scoping with Dr. Aundrea Garcia in GI in June 2023 were several areas of AVM were cauterized.     Getting monthly octreotide with Dr. Garcia    She last completed IV iron 3 months ago 10/9-10/31/24 (Venofer)  Last IV iron venofer 5/23 and 5/30/25      Interim  Patient here with daughter Brea for planned follow up. Completed Iv venofer today total 600 mg given and tolerated well.  Continues on medical mgmt with octreotide monthly, now scheduled with [facility] for discuss balloon enteroscopy 8/2025.    HGB stable, denies bleeding.   Continues with fatigue, denies worsening sob, pica for ice. She denies bruising, blood loss from any orifice that  she can see.       Past Medical History: history of breast cancer, BUZZ, bleeding AVM, diabetes, HTN, HLD, RA  Past Surgical History: Hysterectomy 1972, IR aneurysm repair, right lumpectomy, knee replacements  Family History: neg for malignancy  Social History: lives with naif, able to do all ADLs, no smoking or ETOH    Current Outpatient Medications:     Potassium Chloride ER 20 MEQ Oral Tab CR, Take 20 mEq by mouth 2 (two) times daily., Disp: 60 tablet, Rfl: 1    Cholecalciferol (VITAMIN D3) 1.25 MG (65002 UT) Oral Cap, 1 po q4week., Disp: 4 capsule, Rfl: 4    glipiZIDE 2.5 MG Oral Tab, Take 2.5 mg by mouth every 12 (twelve) hours. 1 po qam breakfest and 1/2 pre dinner., Disp: 60 tablet, Rfl: 1    albuterol (2.5 MG/3ML) 0.083% Inhalation Nebu Soln, Take 3 mL (2.5 mg total) by nebulization in the morning and 3 mL (2.5 mg total) before bedtime., Disp: 60 each, Rfl: 3    losartan 100 MG Oral Tab, Take 1 tablet (100 mg total) by mouth daily., Disp: 90 tablet, Rfl: 3    rOPINIRole 0.25 MG Oral Tab, Take 1 tablet (0.25 mg total) by mouth every evening., Disp: 90 tablet, Rfl: 1    carvedilol 25 MG Oral Tab, Take 1 tablet (25 mg total) by mouth 2 (two) times daily with meals., Disp: 180 tablet, Rfl: 3    pantoprazole 40 MG Oral Tab EC, Take 1 tablet (40 mg total) by mouth in the morning and 1 tablet (40 mg total) in the evening. Take before meals., Disp: 60 tablet, Rfl: 0    LANTUS SOLOSTAR 100 UNIT/ML Subcutaneous Solution Pen-injector, Inject 10 Units into the skin every morning., Disp: , Rfl:     benzonatate 200 MG Oral Cap, Take 1 capsule (200 mg total) by mouth 3 (three) times daily as needed for cough., Disp: 15 capsule, Rfl: 0    gabapentin 100 MG Oral Cap, Take 1 capsule (100 mg total) by mouth nightly., Disp: , Rfl:     insulin lispro 100 UNIT/ML Injection Solution, Inject 3 Units into the skin in the morning and 3 Units at noon and 3 Units in the evening. Inject before meals. Sliding scale depending on blood  sugar. (Patient not taking: Reported on 7/10/2025), Disp: , Rfl:     ipratropium 0.02 % Inhalation Solution, Take 2.5 mL (500 mcg total) by nebulization 2 (two) times daily., Disp: 187.5 mL, Rfl: 2    lidocaine-menthol 4-1 % External Patch, Place 1 patch onto the skin in the morning., Disp: , Rfl:     acetaminophen 325 MG Oral Tab, Take 2 tablets (650 mg total) by mouth every 6 (six) hours as needed., Disp: , Rfl:     rosuvastatin 20 MG Oral Tab, Take 1 tablet (20 mg total) by mouth nightly., Disp: 90 tablet, Rfl: 3    magnesium oxide 400 MG Oral Tab, Take 1 tablet (400 mg total) by mouth 2 (two) times daily. Per Dr. JENSEN Mata, Disp: , Rfl:     predniSONE 5 MG Oral Tab, Take 1 tablet (5 mg total) by mouth daily., Disp: 90 tablet, Rfl: 1    albuterol 108 (90 Base) MCG/ACT Inhalation Aero Soln, TAKE 2 PUFFS BY MOUTH EVERY 4 TO 6 HOURS AS NEEDED, Disp: 6.7 each, Rfl: 5    BD PEN NEEDLE KADEN 2ND GEN 32G X 4 MM Does not apply Misc, 1 EACH DAILY. USE DAILY WITH INSULIN, Disp: 100 each, Rfl: 1    montelukast 10 MG Oral Tab, Take 1 tablet (10 mg total) by mouth nightly., Disp: 90 tablet, Rfl: 3    Blood Glucose Monitoring Suppl (BLOOD GLUCOSE SYSTEM GILLES) Does not apply Kit, Dx. E11.9. Checks qam., Disp: 1 kit, Rfl: 0    Blood Glucose Monitoring Suppl w/Device Does not apply Kit, Dx. E11.9. Checks qam., Disp: 1 kit, Rfl: 0    Blood Gluc Meter Disp-Strips Does not apply Device, Dx. E11.9. Checks qam., Disp: 100 each, Rfl: 0    Accu-Chek FastClix Lancets Does not apply Misc, Check sugar once daily as directed (E11.42), Disp: 100 each, Rfl: 2    Blood Glucose Monitoring Suppl (ONETOUCH VERIO) w/Device Does not apply Kit, 1 each daily. Test 1x daily, Disp: 1 kit, Rfl: 0    OneTouch Delica Lancets 33G Does not apply Misc, 4 x daily, Disp: 100 each, Rfl: 1    Allergies:  Allergies   Allergen Reactions    Celebrex [Celecoxib] PALPITATIONS    Penicillins ITCHING and SWELLING    Amlodipine OTHER (SEE COMMENTS)     Calves Swelling      Metformin And Related UNKNOWN    Olmesartan UNKNOWN        Review of Systems:Oncology specific ROS negative except as per HPI    BP (!) 166/56 (BP Location: Left arm, Patient Position: Sitting, Cuff Size: adult)   Pulse 79   Temp 98.1 °F (36.7 °C) (Oral)   Resp 18   SpO2 93%   Wt Readings from Last 6 Encounters:   07/10/25 62.1 kg (137 lb)   06/27/25 62.4 kg (137 lb 9.6 oz)   06/18/25 61.3 kg (135 lb 3.2 oz)   06/13/25 63 kg (139 lb)   06/11/25 60.2 kg (132 lb 11.2 oz)   05/30/25 62.1 kg (137 lb)     PE  General: Patient is alert and oriented x 3, not in acute distress. Steady gait.  HEENT: EOMI, MMM, No LAD russell cervical or supraclavicular, neck supple  Chest: Clear BS to auscultation bilaterally, no wob  Abdomen: soft, non tender, non distended, +BSx4  Extremities: mild bilateral LE edema at the feet  Neurological: Strength is grossly intact. Moving all extremities. Gait appropriate.   Psych/Depression: Appropriate mood and affect          Lab Results   Component Value Date    WBC 6.9 07/25/2025    RBC 3.26 (L) 07/25/2025    HGB 9.0 (L) 07/25/2025    HCT 30.1 (L) 07/25/2025    MCV 92.3 07/25/2025    MCH 27.6 07/25/2025    MCHC 29.9 (L) 07/25/2025    RDW 18.0 (H) 07/25/2025    .0 07/25/2025     Lab Results   Component Value Date     (H) 07/25/2025    BUN 14 07/25/2025    BUNCREA 11.5 07/25/2025    CREATSERUM 1.22 (H) 07/25/2025    ANIONGAP 10 07/25/2025    GFRNAA 47 (L) 05/02/2022    GFRAA 54 (L) 05/02/2022    CA 9.3 07/25/2025    OSMOCALC 300 (H) 07/25/2025    ALKPHO 73 07/10/2025    AST 30 07/10/2025    ALT 24 07/10/2025    BILT 0.3 07/10/2025    TP 6.9 07/10/2025    ALB 3.8 07/10/2025    GLOBULIN 3.1 07/10/2025     07/25/2025    K 3.6 07/25/2025     07/25/2025    CO2 25.0 07/25/2025       Lab Results   Component Value Date    ROSALIE 52 07/10/2025     Lab Results   Component Value Date    IRON 33 (L) 07/10/2025     Lab Results   Component Value Date    IRON 33 (L) 07/10/2025     TRANSFERRIN 207 (L) 07/10/2025    TIBCP 278 07/10/2025    SAT 12 (L) 07/10/2025       Impression and Plan:  85 year old with chronic iron deficiency anemia in the setting of bleeding AVMs s/p repeat endoscopic treatment, Iv iron treatment and rbc tfx 5/2022,    1.) BUZZ from AVM's    - previously CAPUTO has been a hallmark for symptomatic anemia for her, also has worsening fatigue and some lightheadedness  --pt received venofer in 4/24   --pt has improved symptoms with monthly octreotide by Dr. Garcia in GI for continued blood loss;   -11/22/24 Last IV iron 10/9-10/31/24 (Venofer)  -completed IV iron 5/23 and 5/30/25 Venofer    -6/8-6/11/25 she was admitted with SOB and melena, GI bleed , anemia, hypoxemia. HGB 6.5, received 1 unit RBC transfusion, and IV iron.  -cont medical mgmt with octreotide monthly which is being ordered by GI  -consulted with [facility] to discuss balloon enteroscopy 6/25/25.  -HGB improved   -goal is to keep Prudencio's iron saturation over 25% as well as ferritin in normal range and over 100 would be great      -HGB stable, received IV iron venofer today (7/18, 7/23 and 7/25/25) 600 mg  -cont labs checks every 2 wks, check iron studies in 4 weeks  -follow up in 2 weeks with next octreotide to get on every 4 wk schedule with octreotide  -planning for [facility] balloon enteroscopy in 8/2025        2.) HTN  --better control, cont to f/u with her PCP  --improved but still high, 166/56, she said she just took her bp medications (on hydralazine, losartan, carvedilol) She says she sometimes forgets the evening dose of hydralazine and carvedilol. Discussed importance of compliance with antihypertensives while on octreotide.  --follows Dr. Sorto to see on 8/15/25    3) History of PE  --found on 2/23/24; not on anticoagulation in light of recent GI bleeding   --IVC filter placed on 3/7/24 by IR; she was rec for IVC filter retreival in 3-6 mo by IR, so this was removed  in 6/24  --her PE was noted to be resolved by CT  chest on 4/26      4.) COPD/pulm fibrosis  -recent hospitalization and pneumonia 10/22/24, now resolved  -followed by Dr. Parker, now has nebulizer for albuterol and oxygen at home, she uses mainly at night    5.) RA  cont to follow Rheum Dr. Goodrich, on cimzia    MDM: Moderate Risk; ongoing likely GI bleeding from AVM's, HTN, history of PE, IVC filter placement        BILL Alexandra    [facility]  [facility] [place]  [address]  [phone]

## 2025-07-25 NOTE — PROGRESS NOTES
Pt here for venofer 200 3/3 . Pt denies any issues or concerns.   Lab collected per order additional labs per pt request Dose given as ordered-tolerated well.  PAC decannulated and pt dc'd without complaints, VSS.      Ordering Provider: Cade Woodard Exp: completion of 3/3     Pt tolerated infusion without difficulty or complaint. Reviewed next apt date/time:yes    Education Record  Learner:  Patient  Disease / Diagnosis: anemia  Barriers / Limitations:  None  Method:  Discussion  General Topics:  Medication, Side effects and symptom management, and Plan of care reviewed  Outcome:  Shows understanding

## 2025-07-28 RX ORDER — GLIPIZIDE 2.5 MG/1
2.5 TABLET ORAL EVERY 12 HOURS
Qty: 60 TABLET | Refills: 1 | Status: SHIPPED | OUTPATIENT
Start: 2025-07-28

## 2025-08-05 DIAGNOSIS — K55.21 AVM (ARTERIOVENOUS MALFORMATION) OF SMALL BOWEL, ACQUIRED WITH HEMORRHAGE: ICD-10-CM

## 2025-08-05 DIAGNOSIS — D50.0 IRON DEFICIENCY ANEMIA DUE TO CHRONIC BLOOD LOSS: ICD-10-CM

## 2025-08-05 DIAGNOSIS — K92.2 GASTROINTESTINAL HEMORRHAGE, UNSPECIFIED GASTROINTESTINAL HEMORRHAGE TYPE: Primary | ICD-10-CM

## 2025-08-06 ENCOUNTER — OFFICE VISIT (OUTPATIENT)
Age: 85
End: 2025-08-06

## 2025-08-06 VITALS
DIASTOLIC BLOOD PRESSURE: 67 MMHG | HEART RATE: 80 BPM | WEIGHT: 126 LBS | SYSTOLIC BLOOD PRESSURE: 114 MMHG | HEIGHT: 61 IN | BODY MASS INDEX: 23.79 KG/M2

## 2025-08-06 DIAGNOSIS — M05.79 RHEUMATOID ARTHRITIS INVOLVING MULTIPLE SITES WITH POSITIVE RHEUMATOID FACTOR (HCC): Primary | ICD-10-CM

## 2025-08-06 DIAGNOSIS — Z51.81 MEDICATION MONITORING ENCOUNTER: ICD-10-CM

## 2025-08-06 PROCEDURE — 1126F AMNT PAIN NOTED NONE PRSNT: CPT | Performed by: INTERNAL MEDICINE

## 2025-08-06 PROCEDURE — G2211 COMPLEX E/M VISIT ADD ON: HCPCS | Performed by: INTERNAL MEDICINE

## 2025-08-06 PROCEDURE — 3008F BODY MASS INDEX DOCD: CPT | Performed by: INTERNAL MEDICINE

## 2025-08-06 PROCEDURE — 1159F MED LIST DOCD IN RCRD: CPT | Performed by: INTERNAL MEDICINE

## 2025-08-06 PROCEDURE — 3078F DIAST BP <80 MM HG: CPT | Performed by: INTERNAL MEDICINE

## 2025-08-06 PROCEDURE — 1160F RVW MEDS BY RX/DR IN RCRD: CPT | Performed by: INTERNAL MEDICINE

## 2025-08-06 PROCEDURE — 3074F SYST BP LT 130 MM HG: CPT | Performed by: INTERNAL MEDICINE

## 2025-08-06 PROCEDURE — 99214 OFFICE O/P EST MOD 30 MIN: CPT | Performed by: INTERNAL MEDICINE

## 2025-08-06 RX ORDER — PREDNISONE 5 MG/1
5 TABLET ORAL DAILY
Qty: 90 TABLET | Refills: 1 | Status: SHIPPED | OUTPATIENT
Start: 2025-08-06

## 2025-08-07 ENCOUNTER — APPOINTMENT (OUTPATIENT)
Facility: LOCATION | Age: 85
End: 2025-08-07
Attending: INTERNAL MEDICINE

## 2025-08-07 ENCOUNTER — TELEPHONE (OUTPATIENT)
Dept: ENDOCRINOLOGY CLINIC | Facility: CLINIC | Age: 85
End: 2025-08-07

## 2025-08-07 DIAGNOSIS — E87.6 HYPOKALEMIA: ICD-10-CM

## 2025-08-07 RX ORDER — POTASSIUM CHLORIDE 1500 MG/1
20 TABLET, EXTENDED RELEASE ORAL 2 TIMES DAILY
Qty: 60 TABLET | Refills: 1 | Status: SHIPPED | OUTPATIENT
Start: 2025-08-07

## 2025-08-10 PROBLEM — Z51.81 MEDICATION MONITORING ENCOUNTER: Status: ACTIVE | Noted: 2025-02-04

## 2025-08-12 ENCOUNTER — MED REC SCAN ONLY (OUTPATIENT)
Dept: INTERNAL MEDICINE CLINIC | Facility: CLINIC | Age: 85
End: 2025-08-12

## 2025-08-13 ENCOUNTER — NURSE ONLY (OUTPATIENT)
Age: 85
End: 2025-08-13

## 2025-08-13 DIAGNOSIS — M05.79 RHEUMATOID ARTHRITIS INVOLVING MULTIPLE SITES WITH POSITIVE RHEUMATOID FACTOR (HCC): Primary | ICD-10-CM

## 2025-08-13 PROCEDURE — 96372 THER/PROPH/DIAG INJ SC/IM: CPT | Performed by: INTERNAL MEDICINE

## 2025-08-14 ENCOUNTER — MED REC SCAN ONLY (OUTPATIENT)
Dept: NEUROLOGY | Facility: CLINIC | Age: 85
End: 2025-08-14

## 2025-08-15 ENCOUNTER — NURSE ONLY (OUTPATIENT)
Facility: LOCATION | Age: 85
End: 2025-08-15
Attending: INTERNAL MEDICINE

## 2025-08-15 ENCOUNTER — OFFICE VISIT (OUTPATIENT)
Dept: INTERNAL MEDICINE CLINIC | Facility: CLINIC | Age: 85
End: 2025-08-15

## 2025-08-15 ENCOUNTER — OFFICE VISIT (OUTPATIENT)
Facility: LOCATION | Age: 85
End: 2025-08-15
Attending: INTERNAL MEDICINE

## 2025-08-15 VITALS
HEIGHT: 61 IN | BODY MASS INDEX: 24.55 KG/M2 | OXYGEN SATURATION: 92 % | HEART RATE: 80 BPM | TEMPERATURE: 97 F | DIASTOLIC BLOOD PRESSURE: 62 MMHG | RESPIRATION RATE: 16 BRPM | WEIGHT: 130 LBS | SYSTOLIC BLOOD PRESSURE: 120 MMHG

## 2025-08-15 VITALS
SYSTOLIC BLOOD PRESSURE: 174 MMHG | OXYGEN SATURATION: 94 % | HEART RATE: 68 BPM | WEIGHT: 129 LBS | DIASTOLIC BLOOD PRESSURE: 74 MMHG | TEMPERATURE: 98 F | BODY MASS INDEX: 24.35 KG/M2 | RESPIRATION RATE: 16 BRPM | HEIGHT: 61 IN

## 2025-08-15 DIAGNOSIS — Z51.81 MEDICATION MONITORING ENCOUNTER: ICD-10-CM

## 2025-08-15 DIAGNOSIS — E55.9 VITAMIN D DEFICIENCY: Primary | ICD-10-CM

## 2025-08-15 DIAGNOSIS — N18.31 STAGE 3A CHRONIC KIDNEY DISEASE (HCC): Chronic | ICD-10-CM

## 2025-08-15 DIAGNOSIS — D50.9 IRON DEFICIENCY ANEMIA, UNSPECIFIED IRON DEFICIENCY ANEMIA TYPE: Primary | ICD-10-CM

## 2025-08-15 DIAGNOSIS — D50.0 IRON DEFICIENCY ANEMIA DUE TO CHRONIC BLOOD LOSS: ICD-10-CM

## 2025-08-15 DIAGNOSIS — M05.79 RHEUMATOID ARTHRITIS INVOLVING MULTIPLE SITES WITH POSITIVE RHEUMATOID FACTOR (HCC): ICD-10-CM

## 2025-08-15 DIAGNOSIS — Q27.30 AVM (ARTERIOVENOUS MALFORMATION) (HCC): ICD-10-CM

## 2025-08-15 DIAGNOSIS — D62 ACUTE BLOOD LOSS ANEMIA: ICD-10-CM

## 2025-08-15 DIAGNOSIS — D50.0 IRON DEFICIENCY ANEMIA DUE TO CHRONIC BLOOD LOSS: Primary | ICD-10-CM

## 2025-08-15 DIAGNOSIS — K55.21 AVM (ARTERIOVENOUS MALFORMATION) OF SMALL BOWEL, ACQUIRED WITH HEMORRHAGE: ICD-10-CM

## 2025-08-15 DIAGNOSIS — Z71.85 VACCINE COUNSELING: ICD-10-CM

## 2025-08-15 DIAGNOSIS — I10 HYPERTENSION, UNSPECIFIED TYPE: ICD-10-CM

## 2025-08-15 DIAGNOSIS — E11.9 TYPE 2 DIABETES MELLITUS WITHOUT RETINOPATHY (HCC): ICD-10-CM

## 2025-08-15 LAB
ALT SERPL-CCNC: 21 U/L (ref 10–49)
ANION GAP SERPL CALC-SCNC: 7 MMOL/L (ref 0–18)
AST SERPL-CCNC: 29 U/L (ref ?–34)
BASOPHILS # BLD AUTO: 0.04 X10(3) UL (ref 0–0.2)
BASOPHILS NFR BLD AUTO: 0.4 %
BUN BLD-MCNC: 19 MG/DL (ref 9–23)
BUN/CREAT SERPL: 18.3 (ref 10–20)
CALCIUM BLD-MCNC: 8.8 MG/DL (ref 8.7–10.4)
CHLORIDE SERPL-SCNC: 107 MMOL/L (ref 98–112)
CO2 SERPL-SCNC: 24 MMOL/L (ref 21–32)
CREAT BLD-MCNC: 1.04 MG/DL (ref 0.55–1.02)
CRP SERPL-MCNC: 0.7 MG/DL (ref ?–0.5)
DEPRECATED HBV CORE AB SER IA-ACNC: 238 NG/ML (ref 50–306)
DEPRECATED RDW RBC AUTO: 55.5 FL (ref 35.1–46.3)
EGFRCR SERPLBLD CKD-EPI 2021: 52 ML/MIN/1.73M2 (ref 60–?)
EOSINOPHIL # BLD AUTO: 0.18 X10(3) UL (ref 0–0.7)
EOSINOPHIL NFR BLD AUTO: 1.9 %
ERYTHROCYTE [DISTWIDTH] IN BLOOD BY AUTOMATED COUNT: 17.2 % (ref 11–15)
ERYTHROCYTE [SEDIMENTATION RATE] IN BLOOD: 89 MM/HR (ref 0–30)
FASTING STATUS PATIENT QL REPORTED: NO
GLUCOSE BLD-MCNC: 191 MG/DL (ref 70–99)
HCT VFR BLD AUTO: 30 % (ref 35–48)
HGB BLD-MCNC: 9.2 G/DL (ref 12–16)
IMM GRANULOCYTES # BLD AUTO: 0.07 X10(3) UL (ref 0–1)
IMM GRANULOCYTES NFR BLD: 0.8 %
IRON SATN MFR SERPL: 21 % (ref 15–50)
IRON SERPL-MCNC: 53 UG/DL (ref 50–170)
LYMPHOCYTES # BLD AUTO: 1.19 X10(3) UL (ref 1–4)
LYMPHOCYTES NFR BLD AUTO: 12.8 %
MCH RBC QN AUTO: 27.3 PG (ref 26–34)
MCHC RBC AUTO-ENTMCNC: 30.7 G/DL (ref 31–37)
MCV RBC AUTO: 89 FL (ref 80–100)
MONOCYTES # BLD AUTO: 0.69 X10(3) UL (ref 0.1–1)
MONOCYTES NFR BLD AUTO: 7.4 %
NEUTROPHILS # BLD AUTO: 7.15 X10 (3) UL (ref 1.5–7.7)
NEUTROPHILS # BLD AUTO: 7.15 X10(3) UL (ref 1.5–7.7)
NEUTROPHILS NFR BLD AUTO: 76.7 %
OSMOLALITY SERPL CALC.SUM OF ELEC: 293 MOSM/KG (ref 275–295)
PLATELET # BLD AUTO: 232 10(3)UL (ref 150–450)
POTASSIUM SERPL-SCNC: 4.6 MMOL/L (ref 3.5–5.1)
RBC # BLD AUTO: 3.37 X10(6)UL (ref 3.8–5.3)
SODIUM SERPL-SCNC: 138 MMOL/L (ref 136–145)
TOTAL IRON BINDING CAPACITY: 251 UG/DL (ref 250–425)
TRANSFERRIN SERPL-MCNC: 187 MG/DL (ref 250–380)
WBC # BLD AUTO: 9.3 X10(3) UL (ref 4–11)

## 2025-08-15 PROCEDURE — 99214 OFFICE O/P EST MOD 30 MIN: CPT | Performed by: INTERNAL MEDICINE

## 2025-08-15 PROCEDURE — 3008F BODY MASS INDEX DOCD: CPT | Performed by: INTERNAL MEDICINE

## 2025-08-15 PROCEDURE — 1160F RVW MEDS BY RX/DR IN RCRD: CPT | Performed by: INTERNAL MEDICINE

## 2025-08-15 PROCEDURE — 1159F MED LIST DOCD IN RCRD: CPT | Performed by: INTERNAL MEDICINE

## 2025-08-15 PROCEDURE — G2211 COMPLEX E/M VISIT ADD ON: HCPCS | Performed by: INTERNAL MEDICINE

## 2025-08-15 PROCEDURE — 3078F DIAST BP <80 MM HG: CPT | Performed by: INTERNAL MEDICINE

## 2025-08-15 PROCEDURE — 3074F SYST BP LT 130 MM HG: CPT | Performed by: INTERNAL MEDICINE

## 2025-08-15 PROCEDURE — 1125F AMNT PAIN NOTED PAIN PRSNT: CPT | Performed by: INTERNAL MEDICINE

## 2025-08-15 RX ORDER — OCTREOTIDE ACETATE 20 MG
20 KIT INTRAMUSCULAR ONCE
OUTPATIENT
Start: 2025-09-05

## 2025-08-15 RX ORDER — OCTREOTIDE ACETATE 20 MG
20 KIT INTRAMUSCULAR ONCE
Status: COMPLETED | OUTPATIENT
Start: 2025-08-15 | End: 2025-08-15

## 2025-08-15 RX ADMIN — OCTREOTIDE ACETATE 20 MG: 20 MG KIT INTRAMUSCULAR at 14:56:00

## 2025-08-22 ENCOUNTER — TELEPHONE (OUTPATIENT)
Dept: CASE MANAGEMENT | Age: 85
End: 2025-08-22

## 2025-08-22 DIAGNOSIS — I63.9 CEREBELLAR STROKE (HCC): ICD-10-CM

## 2025-08-22 DIAGNOSIS — Q27.30 AVM (ARTERIOVENOUS MALFORMATION) (HCC): Primary | ICD-10-CM

## 2025-08-25 ENCOUNTER — OFFICE VISIT (OUTPATIENT)
Dept: NEUROLOGY | Facility: CLINIC | Age: 85
End: 2025-08-25

## 2025-08-25 VITALS — WEIGHT: 130 LBS | BODY MASS INDEX: 24.55 KG/M2 | HEIGHT: 61 IN

## 2025-08-25 DIAGNOSIS — G43.E09 CHRONIC MIGRAINE WITH AURA WITHOUT STATUS MIGRAINOSUS, NOT INTRACTABLE: ICD-10-CM

## 2025-08-25 DIAGNOSIS — R51.9 CHRONIC DAILY HEADACHE: ICD-10-CM

## 2025-08-25 DIAGNOSIS — G25.81 RESTLESS LEG: Primary | ICD-10-CM

## 2025-08-25 DIAGNOSIS — I69.30 SEQUELA, POST-STROKE: ICD-10-CM

## 2025-08-25 PROCEDURE — 1160F RVW MEDS BY RX/DR IN RCRD: CPT | Performed by: OTHER

## 2025-08-25 PROCEDURE — 3008F BODY MASS INDEX DOCD: CPT | Performed by: OTHER

## 2025-08-25 PROCEDURE — 99214 OFFICE O/P EST MOD 30 MIN: CPT | Performed by: OTHER

## 2025-08-25 PROCEDURE — 1159F MED LIST DOCD IN RCRD: CPT | Performed by: OTHER

## 2025-08-25 RX ORDER — GABAPENTIN 100 MG/1
200 CAPSULE ORAL NIGHTLY
Qty: 180 CAPSULE | Refills: 1 | Status: SHIPPED | OUTPATIENT
Start: 2025-08-25 | End: 2026-02-21

## 2025-08-25 RX ORDER — ROPINIROLE 0.25 MG/1
TABLET, FILM COATED ORAL
Qty: 19 TABLET | Refills: 0 | Status: SHIPPED | OUTPATIENT
Start: 2025-08-25 | End: 2025-10-09

## 2025-08-25 RX ORDER — MEMANTINE HYDROCHLORIDE 10 MG/1
TABLET ORAL
Qty: 60 TABLET | Refills: 5 | Status: SHIPPED | OUTPATIENT
Start: 2025-08-25

## 2025-08-25 RX ORDER — LEVETIRACETAM 500 MG/1
500 TABLET ORAL EVERY EVENING
Qty: 90 TABLET | Refills: 1 | Status: SHIPPED | OUTPATIENT
Start: 2025-08-25 | End: 2025-08-25

## 2025-08-26 RX ORDER — GLIPIZIDE 2.5 MG/1
2.5 TABLET ORAL EVERY 12 HOURS
Qty: 60 TABLET | Refills: 1 | Status: SHIPPED | OUTPATIENT
Start: 2025-08-26

## (undated) DIAGNOSIS — D50.0 IRON DEFICIENCY ANEMIA DUE TO CHRONIC BLOOD LOSS: Primary | ICD-10-CM

## (undated) DIAGNOSIS — G62.9 NEUROPATHY: Primary | ICD-10-CM

## (undated) DIAGNOSIS — M05.79 RHEUMATOID ARTHRITIS INVOLVING MULTIPLE SITES WITH POSITIVE RHEUMATOID FACTOR (HCC): ICD-10-CM

## (undated) DIAGNOSIS — R73.01 IFG (IMPAIRED FASTING GLUCOSE): ICD-10-CM

## (undated) DIAGNOSIS — E78.5 DYSLIPIDEMIA: ICD-10-CM

## (undated) DIAGNOSIS — E11.9 TYPE 2 DIABETES MELLITUS WITHOUT COMPLICATION, WITHOUT LONG-TERM CURRENT USE OF INSULIN (HCC): Primary | ICD-10-CM

## (undated) DIAGNOSIS — R76.8 HEPATITIS B ANTIBODY POSITIVE: Primary | ICD-10-CM

## (undated) DIAGNOSIS — Z51.81 MEDICATION MONITORING ENCOUNTER: ICD-10-CM

## (undated) DEVICE — KIT CLEAN ENDOKIT 1.1OZ GOWNX2

## (undated) DEVICE — Device: Brand: DUAL NARE NASAL CANNULAE FEMALE LUER CON 7FT O2 TUBE

## (undated) DEVICE — FIAPC® PROBE W/ FILTER 2200 A OD 2.3MM/6.9FR; L 2.2M/7.2FT: Brand: ERBE

## (undated) DEVICE — YANKAUER,BULB TIP,W/O VENT,RIGID,STERILE: Brand: MEDLINE

## (undated) DEVICE — CLIP LGT 11MM OPEN 2.8MM 235CM

## (undated) DEVICE — MEDI-VAC NON-CONDUCTIVE SUCTION TUBING: Brand: CARDINAL HEALTH

## (undated) DEVICE — MEDI-VAC NON-CONDUCTIVE SUCTION TUBING 6MM X 1.8M (6FT.) L: Brand: CARDINAL HEALTH

## (undated) DEVICE — CONMED SCOPE SAVER BITE BLOCK, 20X27 MM: Brand: SCOPE SAVER

## (undated) DEVICE — CAPSULE ENDOSCP 11.4X26.2MM BIOCOMPATIBLE

## (undated) DEVICE — KIT ENDO ORCAPOD 160/180/190

## (undated) DEVICE — FIAPC® PROBE W/ FILTER 2200 SC OD 2.3MM/6.9FR; L 2.2M/7.2FT: Brand: ERBE

## (undated) DEVICE — LINE MNTR ADLT SET O2 INTMD

## (undated) DEVICE — FORCEP RADIAL JAW 4

## (undated) DEVICE — BIPOLAR ELECTROHEMOSTASIS CATHETER: Brand: GOLD PROBE

## (undated) DEVICE — 60 ML SYRINGE REGULAR TIP: Brand: MONOJECT

## (undated) DEVICE — 35 ML SYRINGE REGULAR TIP: Brand: MONOJECT

## (undated) DEVICE — Device

## (undated) DEVICE — YANKAUER SUCTION INSTRUMENT NO CONTROL VENT, BULB TIP, CLEAR: Brand: YANKAUER

## (undated) DEVICE — TRAP MCS 40ML 5IN PLS SCR CAP

## (undated) DEVICE — SNARE ENDOSCOPIC 10MM ROUND

## (undated) DEVICE — REM POLYHESIVE ADULT PATIENT RETURN ELECTRODE: Brand: VALLEYLAB

## (undated) DEVICE — CAPSULE ENDOSCP L26.2MM DIA11.4MM

## (undated) NOTE — Clinical Note
Patient Graduated - Patient completed the [facility] Navigation Program---no readmission in 30 days. Patient has met goals, no further outreach needed. Thank you!   JUN 25 2025 02:15 PM - Office Visit [facility] - Kai Gustafson MD   Future Appointments Date Time Provider Department Center 6/11/2025  2:00 PM ELM CC LAB2 ELM SW Inf [facility] 6/11/2025  3:00 PM Courtney Gibbs APRN ELMSW HemOnc [facility] 6/18/2025 10:30 AM Heike Sorto MD ECHNDIM EC [place] 7/7/2025  2:00 PM EC [facility] RHEUMATOLOGY [facility] 7/10/2025  1:45 PM Vic Thakkar APRN ECWMOENDO [facility] 7/10/2025  2:45 PM ELM CC LAB2 ELM SW Inf [facility] 7/24/2025  9:00 AM EM CT RM3 EM CT [facility] [place] 7/24/2025  9:30 AM EM CT RM3 EMH CT [facility] [place] 8/6/2025  1:40 PM Ashia Goodrich MD ECWMORHEUM [facility] 8/13/2025 11:40 AM Joe Kang DO ENIELHUR [facility] 8/15/2025  1:30 PM Heike Sorto MD ECHNDIM EC [place] 9/3/2025  1:00 PM Mikey Garcia MD [facility]

## (undated) NOTE — LETTER
[facility]  [address], [place], [place]  Authorization for Surgical Operation and Procedure                                                                                           I hereby authorize Zoila Mcqueen MD, my physician and his/her assistants (if applicable), which may include medical students, residents, and/or fellows, to perform the following surgical operation/ procedure and administer such anesthesia as may be determined necessary by my physician: esophagogastroduodenoscopy with possible push enteroscopy on Chester Bautista   2.   I recognize that during the surgical operation/procedure, unforeseen conditions may necessitate additional or different procedures than those listed above.  I, therefore, further authorize and request that the above-named surgeon, assistants, or designees perform such procedures as are, in their judgment, necessary and desirable.    3.   My surgeon/physician has discussed prior to my surgery the potential benefits, risks and side effects of this procedure; the likelihood of achieving goals; and potential problems that might occur during recuperation.  They also discussed reasonable alternatives to the procedure, including risks, benefits, and side effects related to the alternatives and risks related to not receiving this procedure.  I have had all my questions answered and I acknowledge that no guarantee has been made as to the result that may be obtained.    4.   Should the need arise during my operation/procedure, which includes change of level of care prior to discharge, I also consent to the administration of blood and/or blood products.  Further, I understand that despite careful testing and screening of blood or blood products by collecting agencies, I may still be subject to ill effects as a result of receiving a blood transfusion and/or blood products.  The following are some, but not all, of the potential risks that can occur:  fever and allergic reactions, hemolytic reactions, transmission of diseases such as Hepatitis, AIDS and Cytomegalovirus (CMV) and fluid overload.  In the event that I wish to have an autologous transfusion of my own blood, or a directed donor transfusion, I will discuss this with my physician.  Check only if Refusing Blood or Blood Products  I understand refusal of blood or blood products as deemed necessary by my physician may have serious consequences to my condition to include possible death. I hereby assume responsibility for my refusal and release the hospital, its personnel, and my physicians from any responsibility for the consequences of my refusal.    o  Refuse   5.   I authorize the use of any specimen, organs, tissues, body parts or foreign objects that may be removed from my body during the operation/procedure for diagnosis, research or teaching purposes and their subsequent disposal by hospital authorities.  I also authorize the release of specimen test results and/or written reports to my treating physician on the hospital medical staff or other referring or consulting physicians involved in my care, at the discretion of the Pathologist or my treating physician.    6.   I consent to the photographing or videotaping of the operations or procedures to be performed, including appropriate portions of my body for medical, scientific, or educational purposes, provided my identity is not revealed by the pictures or by descriptive texts accompanying them.  If the procedure has been photographed/videotaped, the surgeon will obtain the original picture, image, videotape or CD.  The hospital will not be responsible for storage, release or maintenance of the picture, image, tape or CD.    7.   I consent to the presence of a  or observers in the operating room as deemed necessary by my physician or their designees.    8.   I recognize that in the event my procedure results in extended  X-Ray/fluoroscopy time, I may develop a skin reaction.    9. If I have a Do Not Attempt Resuscitation (DNAR) order in place, that status will be suspended while in the operating room, procedural suite, and during the recovery period unless otherwise explicitly stated by me (or a person authorized to consent on my behalf). The surgeon or my attending physician will determine when the applicable recovery period ends for purposes of reinstating the DNAR order.  10. Patients having a sterilization procedure: I understand that if the procedure is successful the results will be permanent and it will therefore be impossible for me to inseminate, conceive, or bear children.  I also understand that the procedure is intended to result in sterility, although the result has not been guaranteed.   11. I acknowledge that my physician has explained sedation/analgesia administration to me including the risk and benefits I consent to the administration of sedation/analgesia as may be necessary or desirable in the judgment of my physician.    I CERTIFY THAT I HAVE READ AND FULLY UNDERSTAND THE ABOVE CONSENT TO OPERATION and/or OTHER PROCEDURE.     _________________________________________ _________________________________     ___________________________________  Signature of Patient     Signature of Responsible Person                   Printed Name of Responsible Person                              _________________________________________ ______________________________        ___________________________________  Signature of Witness         Date  Time         Relationship to Patient    STATEMENT OF PHYSICIAN My signature below affirms that prior to the time of the procedure; I have explained to the patient and/or his/her legal representative, the risks and benefits involved in the proposed treatment and any reasonable alternative to the proposed treatment. I have also explained the risks and benefits involved in refusal of the  proposed treatment and alternatives to the proposed treatment and have answered the patient's questions. If I have a significant financial interest in a co-management agreement or a significant financial interest in any product or implant, or other significant relationship used in this procedure/surgery, I have disclosed this and had a discussion with my patient.     _______________________________________________________________ _____________________________  (Signature of Physician)                                                                                         (Date)                                   (Time)  Patient Name: Chester Bautista    : 1940   Printed: 2024      Medical Record #: [id number]                                              Page 1 of 1

## (undated) NOTE — LETTER
[facility] 2W/SW  [address] [facility] [address]  [phone]    Blood Transfusion Consent    In the course of your treatment, it may become necessary to administer a transfusion of blood or blood components. This form provides basic information concerning this procedure and, if signed by you, authorizes its administration. By signing this form, you agree that all of your questions about the administration of blood or blood products have been answered by the ordering medical professional or designee.    Description of Procedure  Blood is introduced into one of your veins, commonly in the arm, using a sterilized disposable needle. The amount of blood transfused, and whether the transfusion will be of blood or blood components is a judgement the physician will make based on your particular needs.    Risks  The transfusion is a common procedure of low risk.  MINOR AND TEMPORARY REACTIONS ARE NOT UNCOMMON, including a slight bruise, swelling or local reaction in the area where the needle pierces your skin, or a nonserious reaction to the transfused material itself, including headache, fever or mild skin reaction, such as rash.  Serious reactions are possible, though very unlikely, and include severe allergic reaction (shock) and destruction (hemolysis) of transfused blood cells.  Infectious diseases which are known to be transmitted by blood transfusion include certain types of viral Hepatitis(liver infection from a virus), Human Immunodeficiency Virus (HIV-1,2) infection, a viral infection known to cause Acquired Immunodeficiency Syndrome (AIDS), as well as certain other bacterial, viral, and parasitic diseases. While a minimal risk of acquiring an infectious disease from transfused blood exists, in accordance with the Federal and State law, all due care has been taken in donor selection and testing to avoid transmission of disease.    Alternatives  If loss of blood poses serious threats during your  treatment, THERE IS NO EFFECTIVE ALTERNATIVE TO BLOOD TRANSFUSION. However, if you have any further questions on this matter, your provider will fully explain the alternatives to you if it has not already been done.    I, ______________________________, have read/had read to me the above. I understand the matters bearing on the decision whether or not to authorize a transfusion of blood or blood components. I have no questions which have not been answered to my full satisfaction. I hereby consent to such transfusion as my physician may deem necessary or advisable in the course of my treatment.    ______________________________________________                    ___________________________  (Signature of Patient or Responsible party in case of minor,                 (Printed Name of Patient or incompetent, or unconscious patient)              Responsible Party)    ___________________________               _____________________  (Relationship to Patient if not self)                                    (Date and Time)    __________________________                                                           ______________________              (Signature of Witness)               (Printed Name of Witness)     Language line ()    Telephone/Verbal/Video Consent    __________________________                     ____________________  (Signature of 2nd Witness           (Printed Name of 2nd  Telephone/Verbal/Video Consent)           Witness)    Patient Name: Chester Bautista     : 1940                 Printed: 2025     Medical Record #: [id number]      Rev: 2023

## (undated) NOTE — LETTER
[address] [facility] [address] [place], [place]  Authorization for Surgical Operation and Procedure                                                                                           I hereby authorize Matthew Carranza MD, my physician and his/her assistants (if applicable), which may include medical students, residents, and/or fellows, to perform the following surgical operation/ procedure and administer such anesthesia as may be determined necessary by my physician: Operation/Procedure name (s) ESOPHAGOGASTRODUODENOSCOPY / COLONOSCOPY on Radha Bradley   2. I recognize that during the surgical operation/procedure, unforeseen conditions may necessitate additional or different procedures than those listed above. I, therefore, further authorize and request that the above-named surgeon, assistants, or designees perform such procedures as are, in their judgment, necessary and desirable. 3.   My surgeon/physician has discussed prior to my surgery the potential benefits, risks and side effects of this procedure; the likelihood of achieving goals; and potential problems that might occur during recuperation. They also discussed reasonable alternatives to the procedure, including risks, benefits, and side effects related to the alternatives and risks related to not receiving this procedure. I have had all my questions answered and I acknowledge that no guarantee has been made as to the result that may be obtained. 4.   Should the need arise during my operation/procedure, which includes change of level of care prior to discharge, I also consent to the administration of blood and/or blood products. Further, I understand that despite careful testing and screening of blood or blood products by collecting agencies, I may still be subject to ill effects as a result of receiving a blood transfusion and/or blood products.   The following are some, but not all, of the potential risks that can occur: fever and allergic reactions, hemolytic reactions, transmission of diseases such as Hepatitis, AIDS and Cytomegalovirus (CMV) and fluid overload. In the event that I wish to have an autologous transfusion of my own blood, or a directed donor transfusion, I will discuss this with my physician. Check only if Refusing Blood or Blood Products  I understand refusal of blood or blood products as deemed necessary by my physician may have serious consequences to my condition to include possible death. I hereby assume responsibility for my refusal and release the hospital, its personnel, and my physicians from any responsibility for the consequences of my refusal.    o  Refuse   5. I authorize the use of any specimen, organs, tissues, body parts or foreign objects that may be removed from my body during the operation/procedure for diagnosis, research or teaching purposes and their subsequent disposal by hospital authorities. I also authorize the release of specimen test results and/or written reports to my treating physician on the hospital medical staff or other referring or consulting physicians involved in my care, at the discretion of the Pathologist or my treating physician. 6.   I consent to the photographing or videotaping of the operations or procedures to be performed, including appropriate portions of my body for medical, scientific, or educational purposes, provided my identity is not revealed by the pictures or by descriptive texts accompanying them. If the procedure has been photographed/videotaped, the surgeon will obtain the original picture, image, videotape or CD. The hospital will not be responsible for storage, release or maintenance of the picture, image, tape or CD.    7.   I consent to the presence of a  or observers in the operating room as deemed necessary by my physician or their designees.     8.   I recognize that in the event my procedure results in extended X-Ray/fluoroscopy time, I may develop a skin reaction. 9. If I have a Do Not Attempt Resuscitation (DNAR) order in place, that status will be suspended while in the operating room, procedural suite, and during the recovery period unless otherwise explicitly stated by me (or a person authorized to consent on my behalf). The surgeon or my attending physician will determine when the applicable recovery period ends for purposes of reinstating the DNAR order. 10. Patients having a sterilization procedure: I understand that if the procedure is successful the results will be permanent and it will therefore be impossible for me to inseminate, conceive, or bear children. I also understand that the procedure is intended to result in sterility, although the result has not been guaranteed. 11. I acknowledge that my physician has explained sedation/analgesia administration to me including the risk and benefits I consent to the administration of sedation/analgesia as may be necessary or desirable in the judgment of my physician. I CERTIFY THAT I HAVE READ AND FULLY UNDERSTAND THE ABOVE CONSENT TO OPERATION and/or OTHER PROCEDURE.     _________________________________________ _________________________________     ___________________________________  Signature of Patient     Signature of Responsible Person                   Printed Name of Responsible Person                              _________________________________________ ______________________________        ___________________________________  Signature of Witness         Date  Time         Relationship to Patient    STATEMENT OF PHYSICIAN My signature below affirms that prior to the time of the procedure; I have explained to the patient and/or his/her legal representative, the risks and benefits involved in the proposed treatment and any reasonable alternative to the proposed treatment.  I have also explained the risks and benefits involved in refusal of the proposed treatment and alternatives to the proposed treatment and have answered the patient's questions.  If I have a significant financial interest in a co-management agreement or a significant financial interest in any product or implant, or other significant relationship used in this procedure/surgery, I have disclosed this and had a discussion with my patient.     _______________________________________________________________ _____________________________  (Signature of Physician)                                                                                         (Date)                                   (Time)  Patient Name: Rodo Zaragoza    : 1940   Printed: 2023      Medical Record #: [id number]                                              Page 1 of 1

## (undated) NOTE — LETTER
[address]  [address] [address]  [phone]        Dear Olga Del Rio,      I had the pleasure of seeing your patient, Maru Comes on 6/27/2022. Below please find a summary of our visit. If you have any concerns or questions, please feel free to give me a call to discuss.     Sincerely,  David Amato MD, MD     No notes on file

## (undated) NOTE — [place] [address]  Authorization for Invasive Procedures  Date: 1/23/2025           Time: 1700    I hereby authorize Lisandra Long MD , my physician and his/her assistants (if applicable), which may include medical students, residents, and/or fellows, to perform the following surgical operation/ procedure and administer such anesthesia as may be determined necessary by my physician: Left Upper Extremity Angiogram with Aortic Arch, Left Subclavian Artery and vertebral Artery Angiogram with Possible Intervention  on Chester Bautista  2.   I recognize that during the surgical operation/procedure, unforeseen conditions may necessitate additional or different procedures than those listed above.  I, therefore, further authorize and request that the above-named surgeon, assistants, or designees perform such procedures as are, in their judgment, necessary and desirable.    3.   My surgeon/physician has discussed prior to my surgery the potential benefits, risks and side effects of this procedure; the likelihood of achieving goals; and potential problems that might occur during recuperation.  They also discussed reasonable alternatives to the procedure, including risks, benefits, and side effects related to the alternatives and risks related to not receiving this procedure.  I have had all my questions answered and I acknowledge that no guarantee has been made as to the result that may be obtained.    4.   Should the need arise during my operation/procedure, which includes change of level of care prior to discharge, I also consent to the administration of blood and/or blood products.  Further, I understand that despite careful testing and screening of blood or blood products by collecting agencies, I may still be subject to ill effects as a result of receiving a blood transfusion and/or blood products.  The following are some, but not all, of the potential risks that can occur: fever  and allergic reactions, hemolytic reactions, transmission of diseases such as Hepatitis, AIDS and Cytomegalovirus (CMV) and fluid overload.  In the event that I wish to have an autologous transfusion of my own blood, or a directed donor transfusion, I will discuss this with my physician.   Check only if Refusing Blood or Blood Products  I understand refusal of blood or blood products as deemed necessary by my physician may have serious consequences to my condition to include possible death. I hereby assume responsibility for my refusal and release the hospital, its personnel, and my physicians from any responsibility for the consequences of my refusal.         o  Refuse         5.   I authorize the use of any specimen, organs, tissues, body parts or foreign objects that may be removed from my body during the operation/procedure for diagnosis, research or teaching purposes and their subsequent disposal by hospital authorities.  I also authorize the release of specimen test results and/or written reports to my treating physician on the hospital medical staff or other referring or consulting physicians involved in my care, at the discretion of the Pathologist or my treating physician.    6.   I consent to the photographing or videotaping of the operations or procedures to be performed, including appropriate portions of my body for medical, scientific, or educational purposes, provided my identity is not revealed by the pictures or by descriptive texts accompanying them.  If the procedure has been photographed/videotaped, the surgeon will obtain the original picture, image, videotape or CD.  The hospital will not be responsible for storage, release or maintenance of the picture, image, tape or CD.    7.   I consent to the presence of a  or observers in the operating room as deemed necessary by my physician or their designees.    8.   I recognize that in the event my procedure results in extended  X-Ray/fluoroscopy time, I may develop a skin reaction.    9. If I have a Do Not Attempt Resuscitation (DNAR) order in place, that status will be suspended while in the operating room, procedural suite, and during the recovery period unless otherwise explicitly stated by me (or a person authorized to consent on my behalf). The surgeon or my attending physician will determine when the applicable recovery period ends for purposes of reinstating the DNAR order.  10. Patients having a sterilization procedure: I understand that if the procedure is successful the results will be permanent and it will therefore be impossible for me to inseminate, conceive, or bear children.  I also understand that the procedure is intended to result in sterility, although the result has not been guaranteed.   11. I acknowledge that my physician has explained sedation/analgesia administration to me including the risk and benefits I consent to the administration of sedation/analgesia as may be necessary or desirable in the judgment of my physician.    I CERTIFY THAT I HAVE READ AND FULLY UNDERSTAND THE ABOVE CONSENT TO OPERATION and/or OTHER PROCEDURE.        ____________________________________       _________________________________      ______________________________  Signature of Patient         Signature of Responsible Person        Printed Name of Responsible Person        ____________________________________      _________________________________      ______________________________       Signature of Witness          Relationship to Patient                       Date                                       Time  Patient Name: Chester Bautista  : 1940    Reviewed: 2024   Printed: 2025  Medical Record #: [id number] Page 1 of 2             STATEMENT OF PHYSICIAN My signature below affirms that prior to the time of the procedure; I have explained to the patient and/or his/her legal representative, the risks  and benefits involved in the proposed treatment and any reasonable alternative to the proposed treatment. I have also explained the risks and benefits involved in refusal of the proposed treatment and alternatives to the proposed treatment and have answered the patient's questions. If I have a significant financial interest in a co-management agreement or a significant financial interest in any product or implant, or other significant relationship used in this procedure/surgery, I have disclosed this and had a discussion with my patient.     _______________________________________________________________ _____________________________  (Signature of Physician)                                                                                         (Date)                                   (Time)  Patient Name: Chester Bautista  : 1940    Reviewed: 2024   Printed: 2025  Medical Record #: [id number] Page 2 of 2

## (undated) NOTE — Clinical Note
Initial assessment completed with patient's dtr, Brea. Dtr confirms 5/15/25 TCM/DESI appt--declines sooner appt, d/t other specialist appts and pt to resume outpt rehab--dtr declines [facility]/COPD clinic-prefers to f/u with Dr. Parker. Dtr agrees to additional follow-up calls from nurse care manager.  Thank you!  Future Appointments 5/8/2025   2:30 PM    [facility] CC LAB1                [facility] SW Inf          [facility] 5/13/2025  4:00 PM    [facility] RN RHEUMATOLOGY     [facility]OR[facility]UM          [facility] 5/15/2025  4:30 PM    Heike Sorto MD      [id number]            [place] [address] 5/29/2025  1:45 PM    Chapin Parker MD       EC[facility]OPULM           [facility] 6/5/2025   2:30 PM    [facility] CC LAB2                [facility] SW Inf          [facility] 6/6/2025   12:00 PM   [facility] RHEUMATOLOGY     [facility]          [facility] 6/18/2025  10:30 AM   Heike Sorto MD      [facility] 7/3/2025   2:30 PM    [facility] CC LAB2                [facility] SW Inf          [facility]

## (undated) NOTE — LETTER
[facility] INTERVENTIONAL SUITES  [address] [facility] [address]  [phone]  INFORMED REFUSAL FOR REMOVING CONTINUOUS GLUCOSE MONITOR    I have been advised that it is necessary for me to remove my continuous glucose monitor (CGM) prior to my surgery, procedure, or test. The reasons for the removal of the CGM are based on 's recommendations and have been explained to me. This includes, but is not limited to, known risks to me personally and to the monitor if I choose to leave it on, benefits (accuracy/quality of visualization related to possible obstructions from the shape/form/shadow of the device) and alternatives (not have the test). I understand that my refusal to remove my CGM may cause harm to me and may damage the device.  While I understand the possible consequences, I nevertheless refuse to remove the CGM and am leaving it on against medical advice. I assume responsibility for the consequences of this refusal and release [facility], [facility], [facility], its affiliates and subsidiaries, its employees and agents, and all providers from any and all liability associated with my refusal to follow medical advice and remove the CGM.        ________________________________________________________  (Patient Signature)        Chester Bautista  (Patient Name, Printed)        ________________________________________________________  (Guardian/Surrogate Decision Maker Signature)        ________________________________________________________  (Print Guardian/Surrogate Decision Maker Name)        ___________________________________________  (Date)  Patient Name: Chester Bautista    : 1940   Printed: 10/21/2024      Medical Record #: [id number]                                              Page 1 of 1

## (undated) NOTE — LETTER
[facility]  [address], [place], [place]  Authorization for Surgical Operation and Procedure                                                                                           I hereby authorize  Bradley Boyle MD, my physician and his/her assistants (if applicable), which may include medical students, residents, and/or fellows, to perform the following surgical operation/ procedure and administer such anesthesia as may be determined necessary by my physician: Operation/Procedure name (s) Inferior vena cava filter placement on Chester Bautista   2.   I recognize that during the surgical operation/procedure, unforeseen conditions may necessitate additional or different procedures than those listed above.  I, therefore, further authorize and request that the above-named surgeon, assistants, or designees perform such procedures as are, in their judgment, necessary and desirable.    3.   My surgeon/physician has discussed prior to my surgery the potential benefits, risks and side effects of this procedure; the likelihood of achieving goals; and potential problems that might occur during recuperation.  They also discussed reasonable alternatives to the procedure, including risks, benefits, and side effects related to the alternatives and risks related to not receiving this procedure.  I have had all my questions answered and I acknowledge that no guarantee has been made as to the result that may be obtained.    4.   Should the need arise during my operation/procedure, which includes change of level of care prior to discharge, I also consent to the administration of blood and/or blood products.  Further, I understand that despite careful testing and screening of blood or blood products by collecting agencies, I may still be subject to ill effects as a result of receiving a blood transfusion and/or blood products.  The following are some, but not all, of the potential risks that can  occur: fever and allergic reactions, hemolytic reactions, transmission of diseases such as Hepatitis, AIDS and Cytomegalovirus (CMV) and fluid overload.  In the event that I wish to have an autologous transfusion of my own blood, or a directed donor transfusion, I will discuss this with my physician.  Check only if Refusing Blood or Blood Products  I understand refusal of blood or blood products as deemed necessary by my physician may have serious consequences to my condition to include possible death. I hereby assume responsibility for my refusal and release the hospital, its personnel, and my physicians from any responsibility for the consequences of my refusal.    o  Refuse   5.   I authorize the use of any specimen, organs, tissues, body parts or foreign objects that may be removed from my body during the operation/procedure for diagnosis, research or teaching purposes and their subsequent disposal by hospital authorities.  I also authorize the release of specimen test results and/or written reports to my treating physician on the hospital medical staff or other referring or consulting physicians involved in my care, at the discretion of the Pathologist or my treating physician.    6.   I consent to the photographing or videotaping of the operations or procedures to be performed, including appropriate portions of my body for medical, scientific, or educational purposes, provided my identity is not revealed by the pictures or by descriptive texts accompanying them.  If the procedure has been photographed/videotaped, the surgeon will obtain the original picture, image, videotape or CD.  The hospital will not be responsible for storage, release or maintenance of the picture, image, tape or CD.    7.   I consent to the presence of a  or observers in the operating room as deemed necessary by my physician or their designees.    8.   I recognize that in the event my procedure results in extended  X-Ray/fluoroscopy time, I may develop a skin reaction.    9. If I have a Do Not Attempt Resuscitation (DNAR) order in place, that status will be suspended while in the operating room, procedural suite, and during the recovery period unless otherwise explicitly stated by me (or a person authorized to consent on my behalf). The surgeon or my attending physician will determine when the applicable recovery period ends for purposes of reinstating the DNAR order.  10. Patients having a sterilization procedure: I understand that if the procedure is successful the results will be permanent and it will therefore be impossible for me to inseminate, conceive, or bear children.  I also understand that the procedure is intended to result in sterility, although the result has not been guaranteed.   11. I acknowledge that my physician has explained sedation/analgesia administration to me including the risk and benefits I consent to the administration of sedation/analgesia as may be necessary or desirable in the judgment of my physician.    I CERTIFY THAT I HAVE READ AND FULLY UNDERSTAND THE ABOVE CONSENT TO OPERATION and/or OTHER PROCEDURE.     _________________________________________ _________________________________     ___________________________________  Signature of Patient     Signature of Responsible Person                   Printed Name of Responsible Person                              _________________________________________ ______________________________        ___________________________________  Signature of Witness         Date  Time         Relationship to Patient    STATEMENT OF PHYSICIAN My signature below affirms that prior to the time of the procedure; I have explained to the patient and/or his/her legal representative, the risks and benefits involved in the proposed treatment and any reasonable alternative to the proposed treatment. I have also explained the risks and benefits involved in refusal of the  proposed treatment and alternatives to the proposed treatment and have answered the patient's questions. If I have a significant financial interest in a co-management agreement or a significant financial interest in any product or implant, or other significant relationship used in this procedure/surgery, I have disclosed this and had a discussion with my patient.     _______________________________________________________________ _____________________________  (Signature of Physician)                                                                                         (Date)                                   (Time)  Patient Name: Chester Bautista    : 1940   Printed: 3/6/2024      Medical Record #: [id number]                                              Page 1 of 1

## (undated) NOTE — LETTER
[facility]  [address], [place], [place]  Authorization for Surgical Operation and Procedure                                                                                           I hereby authorize Zoila Mcqueen MD, my physician and his/her assistants (if applicable), which may include medical students, residents, and/or fellows, to perform the following surgical operation/ procedure and administer such anesthesia as may be determined necessary by my physician: Operation/Procedure name (s) ESOPHAGOGASTRODUODENOSCOPY (EGD) with PUSH ENTEROSCOPY on Chester Bautista   2.   I recognize that during the surgical operation/procedure, unforeseen conditions may necessitate additional or different procedures than those listed above.  I, therefore, further authorize and request that the above-named surgeon, assistants, or designees perform such procedures as are, in their judgment, necessary and desirable.    3.   My surgeon/physician has discussed prior to my surgery the potential benefits, risks and side effects of this procedure; the likelihood of achieving goals; and potential problems that might occur during recuperation.  They also discussed reasonable alternatives to the procedure, including risks, benefits, and side effects related to the alternatives and risks related to not receiving this procedure.  I have had all my questions answered and I acknowledge that no guarantee has been made as to the result that may be obtained.    4.   Should the need arise during my operation/procedure, which includes change of level of care prior to discharge, I also consent to the administration of blood and/or blood products.  Further, I understand that despite careful testing and screening of blood or blood products by collecting agencies, I may still be subject to ill effects as a result of receiving a blood transfusion and/or blood products.  The following are some, but not all, of the potential  risks that can occur: fever and allergic reactions, hemolytic reactions, transmission of diseases such as Hepatitis, AIDS and Cytomegalovirus (CMV) and fluid overload.  In the event that I wish to have an autologous transfusion of my own blood, or a directed donor transfusion, I will discuss this with my physician.  Check only if Refusing Blood or Blood Products  I understand refusal of blood or blood products as deemed necessary by my physician may have serious consequences to my condition to include possible death. I hereby assume responsibility for my refusal and release the hospital, its personnel, and my physicians from any responsibility for the consequences of my refusal.    o  Refuse   5.   I authorize the use of any specimen, organs, tissues, body parts or foreign objects that may be removed from my body during the operation/procedure for diagnosis, research or teaching purposes and their subsequent disposal by hospital authorities.  I also authorize the release of specimen test results and/or written reports to my treating physician on the hospital medical staff or other referring or consulting physicians involved in my care, at the discretion of the Pathologist or my treating physician.    6.   I consent to the photographing or videotaping of the operations or procedures to be performed, including appropriate portions of my body for medical, scientific, or educational purposes, provided my identity is not revealed by the pictures or by descriptive texts accompanying them.  If the procedure has been photographed/videotaped, the surgeon will obtain the original picture, image, videotape or CD.  The hospital will not be responsible for storage, release or maintenance of the picture, image, tape or CD.    7.   I consent to the presence of a  or observers in the operating room as deemed necessary by my physician or their designees.    8.   I recognize that in the event my procedure results in  extended X-Ray/fluoroscopy time, I may develop a skin reaction.    9. If I have a Do Not Attempt Resuscitation (DNAR) order in place, that status will be suspended while in the operating room, procedural suite, and during the recovery period unless otherwise explicitly stated by me (or a person authorized to consent on my behalf). The surgeon or my attending physician will determine when the applicable recovery period ends for purposes of reinstating the DNAR order.  10. Patients having a sterilization procedure: I understand that if the procedure is successful the results will be permanent and it will therefore be impossible for me to inseminate, conceive, or bear children.  I also understand that the procedure is intended to result in sterility, although the result has not been guaranteed.   11. I acknowledge that my physician has explained sedation/analgesia administration to me including the risk and benefits I consent to the administration of sedation/analgesia as may be necessary or desirable in the judgment of my physician.    I CERTIFY THAT I HAVE READ AND FULLY UNDERSTAND THE ABOVE CONSENT TO OPERATION and/or OTHER PROCEDURE.     _________________________________________ _________________________________     ___________________________________  Signature of Patient     Signature of Responsible Person                   Printed Name of Responsible Person                              _________________________________________ ______________________________        ___________________________________  Signature of Witness         Date  Time         Relationship to Patient    STATEMENT OF PHYSICIAN My signature below affirms that prior to the time of the procedure; I have explained to the patient and/or his/her legal representative, the risks and benefits involved in the proposed treatment and any reasonable alternative to the proposed treatment. I have also explained the risks and benefits involved in refusal of  the proposed treatment and alternatives to the proposed treatment and have answered the patient's questions. If I have a significant financial interest in a co-management agreement or a significant financial interest in any product or implant, or other significant relationship used in this procedure/surgery, I have disclosed this and had a discussion with my patient.     _______________________________________________________________ _____________________________  (Signature of Physician)                                                                                         (Date)                                   (Time)  Patient Name: Chester Bautista    : 1940   Printed: 2024      Medical Record #: [id number]                                              Page 1 of 1

## (undated) NOTE — LETTER
[facility] 5SW/SE  [address]  [facility] [address]  [phone]    Blood Transfusion Consent    In the course of your treatment, it may become necessary to administer a transfusion of blood or blood components. This form provides basic information concerning this procedure and, if signed by you, authorizes its administration. By signing this form, you agree that all of your questions about the administration of blood or blood products have been answered by the ordering medical professional or designee.    Description of Procedure  Blood is introduced into one of your veins, commonly in the arm, using a sterilized disposable needle. The amount of blood transfused, and whether the transfusion will be of blood or blood components is a judgement the physician will make based on your particular needs.    Risks  The transfusion is a common procedure of low risk.  MINOR AND TEMPORARY REACTIONS ARE NOT UNCOMMON, including a slight bruise, swelling or local reaction in the area where the needle pierces your skin, or a nonserious reaction to the transfused material itself, including headache, fever or mild skin reaction, such as rash.  Serious reactions are possible, though very unlikely, and include severe allergic reaction (shock) and destruction (hemolysis) of transfused blood cells.  Infectious diseases which are known to be transmitted by blood transfusion include certain types of viral Hepatitis(liver infection from a virus), Human Immunodeficiency Virus (HIV-1,2) infection, a viral infection known to cause Acquired Immunodeficiency Syndrome (AIDS), as well as certain other bacterial, viral, and parasitic diseases. While a minimal risk of acquiring an infectious disease from transfused blood exists, in accordance with the Federal and State law, all due care has been taken in donor selection and testing to avoid transmission of disease.    Alternatives  If loss of blood poses serious threats during your  treatment, THERE IS NO EFFECTIVE ALTERNATIVE TO BLOOD TRANSFUSION. However, if you have any further questions on this matter, your provider will fully explain the alternatives to you if it has not already been done.    I, ______________________________, have read/had read to me the above. I understand the matters bearing on the decision whether or not to authorize a transfusion of blood or blood components. I have no questions which have not been answered to my full satisfaction. I hereby consent to such transfusion as my physician may deem necessary or advisable in the course of my treatment.    ______________________________________________                    ___________________________  (Signature of Patient or Responsible party in case of minor,                 (Printed Name of Patient or incompetent, or unconscious patient)              Responsible Party)    ___________________________               _____________________  (Relationship to Patient if not self)                                    (Date and Time)    __________________________                                                           ______________________              (Signature of Witness)               (Printed Name of Witness)     Language line ()    Telephone/Verbal/Video Consent    __________________________                     ____________________  (Signature of 2nd Witness           (Printed Name of 2nd  Telephone/Verbal/Video Consent)           Witness)    Patient Name: Chester Bautista     : 1940                 Printed: May 3, 2025     Medical Record #: [id number]      Rev: 2023

## (undated) NOTE — LETTER
[address] [facility] [address] [place], [place]  Authorization for Surgical Operation and Procedure                                                                                           I hereby authorize Deandra Gonzalez MD, my physician and his/her assistants (if applicable), which may include medical students, residents, and/or fellows, to perform the following surgical operation/ procedure and administer such anesthesia as may be determined necessary by my physician: Operation/Procedure name (s) Esophagogastroduodenoscopy with push enteroscopy on Maru Comes   2. I recognize that during the surgical operation/procedure, unforeseen conditions may necessitate additional or different procedures than those listed above. I, therefore, further authorize and request that the above-named surgeon, assistants, or designees perform such procedures as are, in their judgment, necessary and desirable. 3.   My surgeon/physician has discussed prior to my surgery the potential benefits, risks and side effects of this procedure; the likelihood of achieving goals; and potential problems that might occur during recuperation. They also discussed reasonable alternatives to the procedure, including risks, benefits, and side effects related to the alternatives and risks related to not receiving this procedure. I have had all my questions answered and I acknowledge that no guarantee has been made as to the result that may be obtained. 4.   Should the need arise during my operation/procedure, which includes change of level of care prior to discharge, I also consent to the administration of blood and/or blood products. Further, I understand that despite careful testing and screening of blood or blood products by collecting agencies, I may still be subject to ill effects as a result of receiving a blood transfusion and/or blood products.   The following are some, but not all, of the potential risks that can occur: fever and allergic reactions, hemolytic reactions, transmission of diseases such as Hepatitis, AIDS and Cytomegalovirus (CMV) and fluid overload. In the event that I wish to have an autologous transfusion of my own blood, or a directed donor transfusion, I will discuss this with my physician. Check only if Refusing Blood or Blood Products  I understand refusal of blood or blood products as deemed necessary by my physician may have serious consequences to my condition to include possible death. I hereby assume responsibility for my refusal and release the hospital, its personnel, and my physicians from any responsibility for the consequences of my refusal.    o  Refuse   5. I authorize the use of any specimen, organs, tissues, body parts or foreign objects that may be removed from my body during the operation/procedure for diagnosis, research or teaching purposes and their subsequent disposal by hospital authorities. I also authorize the release of specimen test results and/or written reports to my treating physician on the hospital medical staff or other referring or consulting physicians involved in my care, at the discretion of the Pathologist or my treating physician. 6.   I consent to the photographing or videotaping of the operations or procedures to be performed, including appropriate portions of my body for medical, scientific, or educational purposes, provided my identity is not revealed by the pictures or by descriptive texts accompanying them. If the procedure has been photographed/videotaped, the surgeon will obtain the original picture, image, videotape or CD. The hospital will not be responsible for storage, release or maintenance of the picture, image, tape or CD.    7.   I consent to the presence of a  or observers in the operating room as deemed necessary by my physician or their designees.     8.   I recognize that in the event my procedure results in extended X-Ray/fluoroscopy time, I may develop a skin reaction. 9. If I have a Do Not Attempt Resuscitation (DNAR) order in place, that status will be suspended while in the operating room, procedural suite, and during the recovery period unless otherwise explicitly stated by me (or a person authorized to consent on my behalf). The surgeon or my attending physician will determine when the applicable recovery period ends for purposes of reinstating the DNAR order. 10. Patients having a sterilization procedure: I understand that if the procedure is successful the results will be permanent and it will therefore be impossible for me to inseminate, conceive, or bear children. I also understand that the procedure is intended to result in sterility, although the result has not been guaranteed. 11. I acknowledge that my physician has explained sedation/analgesia administration to me including the risk and benefits I consent to the administration of sedation/analgesia as may be necessary or desirable in the judgment of my physician. I CERTIFY THAT I HAVE READ AND FULLY UNDERSTAND THE ABOVE CONSENT TO OPERATION and/or OTHER PROCEDURE.     _________________________________________ _________________________________     ___________________________________  Signature of Patient     Signature of Responsible Person                   Printed Name of Responsible Person                              _________________________________________ ______________________________        ___________________________________  Signature of Witness         Date  Time         Relationship to Patient    STATEMENT OF PHYSICIAN My signature below affirms that prior to the time of the procedure; I have explained to the patient and/or his/her legal representative, the risks and benefits involved in the proposed treatment and any reasonable alternative to the proposed treatment.  I have also explained the risks and benefits involved in refusal of the proposed treatment and alternatives to the proposed treatment and have answered the patient's questions.  If I have a significant financial interest in a co-management agreement or a significant financial interest in any product or implant, or other significant relationship used in this procedure/surgery, I have disclosed this and had a discussion with my patient.     _______________________________________________________________ _____________________________  (Signature of Physician)                                                                                         (Date)                                   (Time)  Patient Name: Vinod Aiken    : 1940   Printed: 2023      Medical Record #: [id number]                                              Page 1 of 1

## (undated) NOTE — Clinical Note
JOSÉ LUISI - Spoke with patient's daughter Brea. Patient did not wake up during the night with hypoglycemia. Alarms are on per Brea. They will make adjustments to medications as recommended, call if any glucose < 70, and follow up in one week with glucose levels.

## (undated) NOTE — Clinical Note
As instructed patient will take extra glipizide 5 mg before dinner today, increase lantus to 30 units nightly, and start glipizide 10 mg before breakfast. I'll call her tomorrow around 1 p.m. for follow up.

## (undated) NOTE — LETTER
[place] [address]  Authorization for Invasive Procedures  Date: 01/31/2025           Time: 1615    I hereby authorize Dr. Boyle, my physician and his/her assistants (if applicable), which may include medical students, residents, and/or fellows, to perform the following surgical operation/ procedure and administer such anesthesia as may be determined necessary by my physician: Inferior vena cava filter placement on Chester Bautista  2.   I recognize that during the surgical operation/procedure, unforeseen conditions may necessitate additional or different procedures than those listed above.  I, therefore, further authorize and request that the above-named surgeon, assistants, or designees perform such procedures as are, in their judgment, necessary and desirable.    3.   My surgeon/physician has discussed prior to my surgery the potential benefits, risks and side effects of this procedure; the likelihood of achieving goals; and potential problems that might occur during recuperation.  They also discussed reasonable alternatives to the procedure, including risks, benefits, and side effects related to the alternatives and risks related to not receiving this procedure.  I have had all my questions answered and I acknowledge that no guarantee has been made as to the result that may be obtained.    4.   Should the need arise during my operation/procedure, which includes change of level of care prior to discharge, I also consent to the administration of blood and/or blood products.  Further, I understand that despite careful testing and screening of blood or blood products by collecting agencies, I may still be subject to ill effects as a result of receiving a blood transfusion and/or blood products.  The following are some, but not all, of the potential risks that can occur: fever and allergic reactions, hemolytic reactions, transmission of diseases such as Hepatitis, AIDS and  Cytomegalovirus (CMV) and fluid overload.  In the event that I wish to have an autologous transfusion of my own blood, or a directed donor transfusion, I will discuss this with my physician.   Check only if Refusing Blood or Blood Products  I understand refusal of blood or blood products as deemed necessary by my physician may have serious consequences to my condition to include possible death. I hereby assume responsibility for my refusal and release the hospital, its personnel, and my physicians from any responsibility for the consequences of my refusal.         o  Refuse         5.   I authorize the use of any specimen, organs, tissues, body parts or foreign objects that may be removed from my body during the operation/procedure for diagnosis, research or teaching purposes and their subsequent disposal by hospital authorities.  I also authorize the release of specimen test results and/or written reports to my treating physician on the hospital medical staff or other referring or consulting physicians involved in my care, at the discretion of the Pathologist or my treating physician.    6.   I consent to the photographing or videotaping of the operations or procedures to be performed, including appropriate portions of my body for medical, scientific, or educational purposes, provided my identity is not revealed by the pictures or by descriptive texts accompanying them.  If the procedure has been photographed/videotaped, the surgeon will obtain the original picture, image, videotape or CD.  The hospital will not be responsible for storage, release or maintenance of the picture, image, tape or CD.    7.   I consent to the presence of a  or observers in the operating room as deemed necessary by my physician or their designees.    8.   I recognize that in the event my procedure results in extended X-Ray/fluoroscopy time, I may develop a skin reaction.    9. If I have a Do Not Attempt Resuscitation  (DNAR) order in place, that status will be suspended while in the operating room, procedural suite, and during the recovery period unless otherwise explicitly stated by me (or a person authorized to consent on my behalf). The surgeon or my attending physician will determine when the applicable recovery period ends for purposes of reinstating the DNAR order.  10. Patients having a sterilization procedure: I understand that if the procedure is successful the results will be permanent and it will therefore be impossible for me to inseminate, conceive, or bear children.  I also understand that the procedure is intended to result in sterility, although the result has not been guaranteed.   11. I acknowledge that my physician has explained sedation/analgesia administration to me including the risk and benefits I consent to the administration of sedation/analgesia as may be necessary or desirable in the judgment of my physician.    I CERTIFY THAT I HAVE READ AND FULLY UNDERSTAND THE ABOVE CONSENT TO OPERATION and/or OTHER PROCEDURE.        ____________________________________       _________________________________      ______________________________  Signature of Patient         Signature of Responsible Person        Printed Name of Responsible Person        ____________________________________      _________________________________      ______________________________       Signature of Witness          Relationship to Patient                       Date                                       Time  Patient Name: Chester Bautista  : 1940    Reviewed: 2024   Printed: 2025  Medical Record #: [id number] Page 1 of 2             STATEMENT OF PHYSICIAN My signature below affirms that prior to the time of the procedure; I have explained to the patient and/or his/her legal representative, the risks and benefits involved in the proposed treatment and any reasonable alternative to the proposed  treatment. I have also explained the risks and benefits involved in refusal of the proposed treatment and alternatives to the proposed treatment and have answered the patient's questions. If I have a significant financial interest in a co-management agreement or a significant financial interest in any product or implant, or other significant relationship used in this procedure/surgery, I have disclosed this and had a discussion with my patient.     _______________________________________________________________ _____________________________  (Signature of Physician)                                                                                         (Date)                                   (Time)  Patient Name: Chester Bautista  : 1940    Reviewed: 2024   Printed: 2025  Medical Record #: [id number] Page 2 of 2

## (undated) NOTE — LETTER
[facility]  Administration of Anesthesia  I, Chester Bautista agree to be cared for by a physician anesthesiologist alone and/or with a nurse anesthetist, who is specially trained to monitor me and give me medicine to put me to sleep or keep me comfortable during my procedure    I understand that my anesthesiologist and/or anesthetist is not an employee or agent of [facility] or [employer]. He or she works for [employer]    As the patient asking for anesthesia services, I agree to:  Allow the anesthesiologist (anesthesia doctor) to give me medicine and do additional procedures as necessary. Some examples are: Starting or using an “IV” to give me medicine, fluids or blood during my procedure, and having a breathing tube placed to help me breathe when I’m asleep (intubation). In the event that my heart stops working properly, I understand that my anesthesiologist will make every effort to sustain my life, unless otherwise directed by [facility] Do Not Resuscitate documents.  Tell my anesthesia doctor before my procedure:  If I am pregnant.  The last time that I ate or drank.  iii. All of the medicines I take (including prescriptions, herbal supplements, and pills I can buy without a prescription (including street drugs/illegal medications). Failure to inform my anesthesiologist about these medicines may increase my risk of anesthetic complications.  iv.If I am allergic to anything or have had a reaction to anesthesia before.  I understand how the anesthesia medicine will help me (benefits).  I understand that with any type of anesthesia medicine there are risks:  The most common risks are: nausea, vomiting, sore throat, muscle soreness, damage to my eyes, mouth, or teeth (from breathing tube placement).  Rare risks include: remembering what happened during my procedure, allergic reactions to medications, injury to my airway, heart, lungs, vision,  nerves, or muscles and in extremely rare instances death.  My doctor has explained to me other choices available to me for my care (alternatives).  Pregnant Patients (“epidural”):  I understand that the risks of having an epidural (medicine given into my back to help control pain during labor), include itching, low blood pressure, difficulty urinating, headache or slowing of the baby’s heart. Very rare risks include infection, bleeding, seizure, irregular heart rhythms and nerve injury.  Regional Anesthesia (“spinal”, “epidural”, & “nerve blocks”):  I understand that rare but potential complications include headache, bleeding, infection, seizure, irregular heart rhythms, and nerve injury.    _____________________________________________________________________________  Patient (or Representative) Signature/Relationship to Patient  Date   Time    _____________________________________________________________________________   Name (if used)    Language/Organization   Time    _____________________________________________________________________________  Nurse Anesthetist Signature     Date   Time  _____________________________________________________________________________  Anesthesiologist Signature     Date   Time  I have discussed the procedure and information above with the patient (or patient’s representative) and answered their questions. The patient or their representative has agreed to have anesthesia services.    _____________________________________________________________________________  Witness        Date   Time  I have verified that the signature is that of the patient or patient’s representative, and that it was signed before the procedure  Patient Name: Chester Bautista     : 1940                 Printed: 2024 at 7:22 AM    Medical Record #: [id number]                                            Page 1 of 1  ----------ANESTHESIA CONSENT----------

## (undated) NOTE — LETTER
[place] [address]  Authorization for Invasive Procedures  Date: ***           Time: ***    {[facility] ivs consent:00797}

## (undated) NOTE — LETTER
[address], [place]  Authorization for Invasive Procedures  1. I hereby authorize Dr. Nadir Sy , my physician and whomever may be designated as the doctor's assistant, to perform the following operation and/or procedure:  COLONOSCOPY/ ESOPHAGOGASTRODUODENOSCOPY with possible biopsy on Bart Guillory at [facility].    2. My physician has explained to me the nature and purpose of the operation or other procedure, possible alternative methods of treatment, the risks involved and the possibility of complications to me. I understand the probable consequences of declining the recommended procedure and the alternative methods of treatment. I acknowledge that no guarantee has been made as to the result that may be obtained. 3. I recognize that during the course of this operation or other procedure, unforeseen conditions may necessitate additional or different procedures than those listed above. I, therefore, further authorize and request that the above-named physician, his/her physician assistants, or designees perform such procedures as are, in his/her professional opinion, necessary and desirable. If I have a Do Not Attempt Resuscitation (DNAR) order in place, that status will be suspended while in the operating room, procedural suite, and during the recovery period unless otherwise explicitly stated by me (or a person authorized to consent on my behalf). The surgeon or my attending physician will determine when the applicable recovery period ends for purposes of reinstating the DNAR order. 4. Should the need arise during my operation or immediate post-operative period; I also consent to the administration of blood and/or blood products.  Further, I understand that despite careful testing and screening of blood and blood products, I may still be subject to ill effects as a result of recieving a blood transfusion an/or blood producst. The following are some, but not all, of the potential risks that can occur: fever and allergic reactions, hemolytic reactions, transmission of disease such as hepatitis, AIDS, cytomegalovirus (CMV), and flluid overload. In the event that I wish to have autologous transfusions of my own blood, or a directed donor transfusion, I will discuss this with my physician. 5. I consent to the photographing of the operations or procedures to be performed for the purposes of advancing medicine, science, and/or education, provided my identity is not revealed. If the procedure has been videotaped, the physician/surgeon will obtain the original videotape. The hospitals will not be responsible for storage or maintenance of this tape. 6. I consent to the presence of a  or observer as deemed necessary by my physician or his designee. 7. Any tissues or organs removed in the operation or other procedure may be disposed of by and at the discretion of [facility].    8. I understand that the physician and his/her physician assistants may not be employees or agents of [facility], [facility], nor [facility], but are independent medical practitioners who have been permitted to use its facilities for the care and treatment of their patients. 9. Patients having a sterilization procedure: I understand that if the procedure is successful the results will be permanent and it will therefore be impossible for me to inseminate, conceive or bear children. I also understand that the procedure is intended to result in sterility, although the result has not been guaranteed. 10. I CERTIFY THAT I HAVE READ AND FULLY UNDERSTAND THE ABOVE CONSENT TO OPERATION and/or OTHER PROCEDURE. 11. I acknowledge that my physician has explained sedation/analgesia administration to me including the risks and benefits.  I consent to the administration of sedation/analgesia as may be necessary or desirable in the judgment of my physician. Signature of Patient:  ________________________________________________ Date: _________Time: _________    Responsible person in case of minor or unconscious: _____________________________Relationship: ____________     Witness Signature: ____________________________________________ Date: __________ Time: ___________    Statement of Physician  My signature below affirms that prior to the time of the procedure, I have explained to the patient and/or her legal representative, the risks and benefits involved in the proposed treatment and any reasonable alternative to the proposed treatment. I have also explained the risks and benefits involved in the refusal of the proposed treatment and have answered the patient's questions. If I have a significant financial interest in this procedure/surgery, I have disclosed this and had a discussion with my patient.     Signature of Physician:   ________________________________________Date: _________Time:_______ Patient Name: Sylvie Asif  : 1940   Printed: 2022    Medical Record #: [id number]

## (undated) NOTE — LETTER
[facility]  [address] [facility] [address]  [phone]    Blood Transfusion Consent    In the course of your treatment, it may become necessary to administer a transfusion of blood or blood components. This form provides basic information concerning this procedure and, if signed by you, authorizes its administration. By signing this form, you agree that all of your questions about the administration of blood or blood products have been answered by the ordering medical professional or designee.    Description of Procedure  Blood is introduced into one of your veins, commonly in the arm, using a sterilized disposable needle. The amount of blood transfused, and whether the transfusion will be of blood or blood components is a judgement the physician will make based on your particular needs.    Risks  The transfusion is a common procedure of low risk.  MINOR AND TEMPORARY REACTIONS ARE NOT UNCOMMON, including a slight bruise, swelling or local reaction in the area where the needle pierces your skin, or a nonserious reaction to the transfused material itself, including headache, fever or mild skin reaction, such as rash.  Serious reactions are possible, though very unlikely, and include severe allergic reaction (shock) and destruction (hemolysis) of transfused blood cells.  Infectious diseases which are known to be transmitted by blood transfusion include certain types of viral Hepatitis(liver infection from a virus), Human Immunodeficiency Virus (HIV-1,2) infection, a viral infection known to cause Acquired Immunodeficiency Syndrome (AIDS), as well as certain other bacterial, viral, and parasitic diseases. While a minimal risk of acquiring an infectious disease from transfused blood exists, in accordance with the Federal and State law, all due care has been taken in donor selection and testing to avoid transmission of disease.    Alternatives  If loss of blood poses serious threats during your  treatment, THERE IS NO EFFECTIVE ALTERNATIVE TO BLOOD TRANSFUSION. However, if you have any further questions on this matter, your provider will fully explain the alternatives to you if it has not already been done.    I, ______________________________, have read/had read to me the above. I understand the matters bearing on the decision whether or not to authorize a transfusion of blood or blood components. I have no questions which have not been answered to my full satisfaction. I hereby consent to such transfusion as my physician may deem necessary or advisable in the course of my treatment.    ______________________________________________                    ___________________________  (Signature of Patient or Responsible party in case of minor,                 (Printed Name of Patient or incompetent, or unconscious patient)              Responsible Party)    ___________________________               _____________________  (Relationship to Patient if not self)                                    (Date and Time)    __________________________                                                           ______________________              (Signature of Witness)               (Printed Name of Witness)     Language line ()    Telephone/Verbal/Video Consent    __________________________                     ____________________  (Signature of 2nd Witness           (Printed Name of 2nd  Telephone/Verbal/Video Consent)           Witness)    Patient Name: Chester Bautista     : 1940                 Printed: 2024     Medical Record #: [id number]      Rev: 2023

## (undated) NOTE — LETTER
[place] [address]  Authorization for Invasive Procedures  Date: 1/29/2025           Time:     I hereby authorize Bradley Boyle MD, my physician and his/her assistants (if applicable), which may include medical students, residents, and/or fellows, to perform the following surgical operation/ procedure and administer such anesthesia as may be determined necessary by my physician: Port stripping with possible port exchange on Chester Bautista  2.   I recognize that during the surgical operation/procedure, unforeseen conditions may necessitate additional or different procedures than those listed above.  I, therefore, further authorize and request that the above-named surgeon, assistants, or designees perform such procedures as are, in their judgment, necessary and desirable.    3.   My surgeon/physician has discussed prior to my surgery the potential benefits, risks and side effects of this procedure; the likelihood of achieving goals; and potential problems that might occur during recuperation.  They also discussed reasonable alternatives to the procedure, including risks, benefits, and side effects related to the alternatives and risks related to not receiving this procedure.  I have had all my questions answered and I acknowledge that no guarantee has been made as to the result that may be obtained.    4.   Should the need arise during my operation/procedure, which includes change of level of care prior to discharge, I also consent to the administration of blood and/or blood products.  Further, I understand that despite careful testing and screening of blood or blood products by collecting agencies, I may still be subject to ill effects as a result of receiving a blood transfusion and/or blood products.  The following are some, but not all, of the potential risks that can occur: fever and allergic reactions, hemolytic reactions, transmission of diseases such as Hepatitis, AIDS and  Cytomegalovirus (CMV) and fluid overload.  In the event that I wish to have an autologous transfusion of my own blood, or a directed donor transfusion, I will discuss this with my physician.   Check only if Refusing Blood or Blood Products  I understand refusal of blood or blood products as deemed necessary by my physician may have serious consequences to my condition to include possible death. I hereby assume responsibility for my refusal and release the hospital, its personnel, and my physicians from any responsibility for the consequences of my refusal.         o  Refuse         5.   I authorize the use of any specimen, organs, tissues, body parts or foreign objects that may be removed from my body during the operation/procedure for diagnosis, research or teaching purposes and their subsequent disposal by hospital authorities.  I also authorize the release of specimen test results and/or written reports to my treating physician on the hospital medical staff or other referring or consulting physicians involved in my care, at the discretion of the Pathologist or my treating physician.    6.   I consent to the photographing or videotaping of the operations or procedures to be performed, including appropriate portions of my body for medical, scientific, or educational purposes, provided my identity is not revealed by the pictures or by descriptive texts accompanying them.  If the procedure has been photographed/videotaped, the surgeon will obtain the original picture, image, videotape or CD.  The hospital will not be responsible for storage, release or maintenance of the picture, image, tape or CD.    7.   I consent to the presence of a  or observers in the operating room as deemed necessary by my physician or their designees.    8.   I recognize that in the event my procedure results in extended X-Ray/fluoroscopy time, I may develop a skin reaction.    9. If I have a Do Not Attempt Resuscitation  (DNAR) order in place, that status will be suspended while in the operating room, procedural suite, and during the recovery period unless otherwise explicitly stated by me (or a person authorized to consent on my behalf). The surgeon or my attending physician will determine when the applicable recovery period ends for purposes of reinstating the DNAR order.  10. Patients having a sterilization procedure: I understand that if the procedure is successful the results will be permanent and it will therefore be impossible for me to inseminate, conceive, or bear children.  I also understand that the procedure is intended to result in sterility, although the result has not been guaranteed.   11. I acknowledge that my physician has explained sedation/analgesia administration to me including the risk and benefits I consent to the administration of sedation/analgesia as may be necessary or desirable in the judgment of my physician.    I CERTIFY THAT I HAVE READ AND FULLY UNDERSTAND THE ABOVE CONSENT TO OPERATION and/or OTHER PROCEDURE.        ____________________________________       _________________________________      ______________________________  Signature of Patient         Signature of Responsible Person        Printed Name of Responsible Person        ____________________________________      _________________________________      ______________________________       Signature of Witness          Relationship to Patient                       Date                                       Time  Patient Name: Chester Bautista  : 1940    Reviewed: 2024   Printed: 2025  Medical Record #: [id number] Page 1 of 2             STATEMENT OF PHYSICIAN My signature below affirms that prior to the time of the procedure; I have explained to the patient and/or his/her legal representative, the risks and benefits involved in the proposed treatment and any reasonable alternative to the proposed  treatment. I have also explained the risks and benefits involved in refusal of the proposed treatment and alternatives to the proposed treatment and have answered the patient's questions. If I have a significant financial interest in a co-management agreement or a significant financial interest in any product or implant, or other significant relationship used in this procedure/surgery, I have disclosed this and had a discussion with my patient.     _______________________________________________________________ _____________________________  (Signature of Physician)                                                                                         (Date)                                   (Time)  Patient Name: Chester Bautista  : 1940    Reviewed: 2024   Printed: 2025  Medical Record #: [id number] Page 2 of 2

## (undated) NOTE — LETTER
[place] [address]  Authorization for Invasive Procedures  Date: 1/29/2025           Time: ***    I hereby authorize Bradley Boyle MD, my physician and his/her assistants (if applicable), which may include medical students, residents, and/or fellows, to perform the following surgical operation/ procedure and administer such anesthesia as may be determined necessary by my physician: Port Stripping with possible port exchange on Chester Bautista  2.   I recognize that during the surgical operation/procedure, unforeseen conditions may necessitate additional or different procedures than those listed above.  I, therefore, further authorize and request that the above-named surgeon, assistants, or designees perform such procedures as are, in their judgment, necessary and desirable.    3.   My surgeon/physician has discussed prior to my surgery the potential benefits, risks and side effects of this procedure; the likelihood of achieving goals; and potential problems that might occur during recuperation.  They also discussed reasonable alternatives to the procedure, including risks, benefits, and side effects related to the alternatives and risks related to not receiving this procedure.  I have had all my questions answered and I acknowledge that no guarantee has been made as to the result that may be obtained.    4.   Should the need arise during my operation/procedure, which includes change of level of care prior to discharge, I also consent to the administration of blood and/or blood products.  Further, I understand that despite careful testing and screening of blood or blood products by collecting agencies, I may still be subject to ill effects as a result of receiving a blood transfusion and/or blood products.  The following are some, but not all, of the potential risks that can occur: fever and allergic reactions, hemolytic reactions, transmission of diseases such as Hepatitis, AIDS  and Cytomegalovirus (CMV) and fluid overload.  In the event that I wish to have an autologous transfusion of my own blood, or a directed donor transfusion, I will discuss this with my physician.   Check only if Refusing Blood or Blood Products  I understand refusal of blood or blood products as deemed necessary by my physician may have serious consequences to my condition to include possible death. I hereby assume responsibility for my refusal and release the hospital, its personnel, and my physicians from any responsibility for the consequences of my refusal.         o  Refuse         5.   I authorize the use of any specimen, organs, tissues, body parts or foreign objects that may be removed from my body during the operation/procedure for diagnosis, research or teaching purposes and their subsequent disposal by hospital authorities.  I also authorize the release of specimen test results and/or written reports to my treating physician on the hospital medical staff or other referring or consulting physicians involved in my care, at the discretion of the Pathologist or my treating physician.    6.   I consent to the photographing or videotaping of the operations or procedures to be performed, including appropriate portions of my body for medical, scientific, or educational purposes, provided my identity is not revealed by the pictures or by descriptive texts accompanying them.  If the procedure has been photographed/videotaped, the surgeon will obtain the original picture, image, videotape or CD.  The hospital will not be responsible for storage, release or maintenance of the picture, image, tape or CD.    7.   I consent to the presence of a  or observers in the operating room as deemed necessary by my physician or their designees.    8.   I recognize that in the event my procedure results in extended X-Ray/fluoroscopy time, I may develop a skin reaction.    9. If I have a Do Not Attempt  Resuscitation (DNAR) order in place, that status will be suspended while in the operating room, procedural suite, and during the recovery period unless otherwise explicitly stated by me (or a person authorized to consent on my behalf). The surgeon or my attending physician will determine when the applicable recovery period ends for purposes of reinstating the DNAR order.  10. Patients having a sterilization procedure: I understand that if the procedure is successful the results will be permanent and it will therefore be impossible for me to inseminate, conceive, or bear children.  I also understand that the procedure is intended to result in sterility, although the result has not been guaranteed.   11. I acknowledge that my physician has explained sedation/analgesia administration to me including the risk and benefits I consent to the administration of sedation/analgesia as may be necessary or desirable in the judgment of my physician.    I CERTIFY THAT I HAVE READ AND FULLY UNDERSTAND THE ABOVE CONSENT TO OPERATION and/or OTHER PROCEDURE.        ____________________________________       _________________________________      ______________________________  Signature of Patient         Signature of Responsible Person        Printed Name of Responsible Person        ____________________________________      _________________________________      ______________________________       Signature of Witness          Relationship to Patient                       Date                                       Time  Patient Name: Chester Bautista  : 1940    Reviewed: 2024   Printed: 2025  Medical Record #: [id number] Page 1 of 2             STATEMENT OF PHYSICIAN My signature below affirms that prior to the time of the procedure; I have explained to the patient and/or his/her legal representative, the risks and benefits involved in the proposed treatment and any reasonable alternative to the  proposed treatment. I have also explained the risks and benefits involved in refusal of the proposed treatment and alternatives to the proposed treatment and have answered the patient's questions. If I have a significant financial interest in a co-management agreement or a significant financial interest in any product or implant, or other significant relationship used in this procedure/surgery, I have disclosed this and had a discussion with my patient.     _______________________________________________________________ _____________________________  (Signature of Physician)                                                                                         (Date)                                   (Time)  Patient Name: Chester Bautista  : 1940    Reviewed: 2024   Printed: 2025  Medical Record #: [id number] Page 2 of 2

## (undated) NOTE — LETTER
[place] [address]  Authorization for Invasive Procedures  Date: 1/23/25         Time: 1700    I hereby authorize Lisandra Long MD, my physician and his/her assistants (if applicable), which may include medical students, residents, and/or fellows, to perform the following surgical operation/ procedure and administer such anesthesia as may be determined necessary by my physician:  Operation/Procedure name (s)  Cardiac Catheterization, Left Ventricular Cineangiography, Bilateral Selective Coronary Angiography and/or Right Heart Catheterization; possible Percutaneous Transluminal Coronary Angioplasty, Coronary Atherectomy, Coronary Stent, Intracoronary Thrombolytic therapy, Antiplatelet therapy and/or Intravascular Ultrasound on Chester Bautista   2.   I recognize that during the surgical operation/procedure, unforeseen conditions may necessitate additional or different procedures than those listed above.  I, therefore, further authorize and request that the above-named surgeon, assistants, or designees perform such procedures as are, in their judgment, necessary and desirable.    3.   My surgeon/physician has discussed prior to my surgery the potential benefits, risks and side effects of this procedure; the likelihood of achieving goals; and potential problems that might occur during recuperation.  They also discussed reasonable alternatives to the procedure, including risks, benefits, and side effects related to the alternatives and risks related to not receiving this procedure.  I have had all my questions answered and I acknowledge that no guarantee has been made as to the result that may be obtained.    4.   Should the need arise during my operation/procedure, which includes change of level of care prior to discharge, I also consent to the administration of blood and/or blood products.  Further, I understand that despite careful testing and screening of blood or blood  products by collecting agencies, I may still be subject to ill effects as a result of receiving a blood transfusion and/or blood products.  The following are some, but not all, of the potential risks that can occur: fever and allergic reactions, hemolytic reactions, transmission of diseases such as Hepatitis, AIDS and Cytomegalovirus (CMV) and fluid overload.  In the event that I wish to have an autologous transfusion of my own blood, or a directed donor transfusion, I will discuss this with my physician.  Check only if Refusing Blood or Blood Products  I understand refusal of blood or blood products as deemed necessary by my physician may have serious consequences to my condition to include possible death. I hereby assume responsibility for my refusal and release the hospital, its personnel, and my physicians from any responsibility for the consequences of my refusal.          o  Refuse      5.   I authorize the use of any specimen, organs, tissues, body parts or foreign objects that may be removed from my body during the operation/procedure for diagnosis, research or teaching purposes and their subsequent disposal by hospital authorities.  I also authorize the release of specimen test results and/or written reports to my treating physician on the hospital medical staff or other referring or consulting physicians involved in my care, at the discretion of the Pathologist or my treating physician.    6.   I consent to the photographing or videotaping of the operations or procedures to be performed, including appropriate portions of my body for medical, scientific, or educational purposes, provided my identity is not revealed by the pictures or by descriptive texts accompanying them.  If the procedure has been photographed/videotaped, the surgeon will obtain the original picture, image, videotape or CD.  The hospital will not be responsible for storage, release or maintenance of the picture, image, tape or CD.    7.    I consent to the presence of a  or observers in the operating room as deemed necessary by my physician or their designees.    8.   I recognize that in the event my procedure results in extended X-Ray/fluoroscopy time, I may develop a skin reaction.    9. If I have a Do Not Attempt Resuscitation (DNAR) order in place, that status will be suspended while in the operating room, procedural suite, and during the recovery period unless otherwise explicitly stated by me (or a person authorized to consent on my behalf). The surgeon or my attending physician will determine when the applicable recovery period ends for purposes of reinstating the DNAR order.  10. Patients having a sterilization procedure: I understand that if the procedure is successful the results will be permanent and it will therefore be impossible for me to inseminate, conceive, or bear children.  I also understand that the procedure is intended to result in sterility, although the result has not been guaranteed.   11. I acknowledge that my physician has explained sedation/analgesia administration to me including the risk and benefits I consent to the administration of sedation/analgesia as may be necessary or desirable in the judgment of my physician.    I CERTIFY THAT I HAVE READ AND FULLY UNDERSTAND THE ABOVE CONSENT TO OPERATION and/or OTHER PROCEDURE.        ____________________________________       _________________________________      ______________________________  Signature of Patient         Signature of Responsible Person        Printed Name of Responsible Person    ____________________________________      _________________________________      ______________________________       Signature of Witness          Relationship to Patient                       Date                                       Time  Patient Name: Chester Bautista  : 1940    Reviewed: 2024   Printed: 2025  Medical Record  #: [id number] Page 1 of 2           STATEMENT OF PHYSICIAN My signature below affirms that prior to the time of the procedure; I have explained to the patient and/or his/her legal representative, the risks and benefits involved in the proposed treatment and any reasonable alternative to the proposed treatment. I have also explained the risks and benefits involved in refusal of the proposed treatment and alternatives to the proposed treatment and have answered the patient's questions. If I have a significant financial interest in a co-management agreement or a significant financial interest in any product or implant, or other significant relationship used in this procedure/surgery, I have disclosed this and had a discussion with my patient.     _______________________________________________________________ _____________________________  (Signature of Physician)                                                                                         (Date)                                   (Time)  Patient Name: Chester Bautista  : 1940    Reviewed: 2024   Printed: 2025  Medical Record #: [id number] Page 2 of 2

## (undated) NOTE — Clinical Note
FYI, TCM call made, see NCM notes. [facility] Attempted to schedule PCP office TCM appointment with patient, Due to MD's full schedule message was sent to MD's office with request to assist with scheduling TCM appointment.

## (undated) NOTE — LETTER
April 27, 2022         [address] [facility] Herb Dotson, [address] [address]      Patient: Maye Barrett   YOB: 1940   Date of Visit: 4/27/2022       Dear Dr. Herb Dotson MD,    I saw your patient, Maye Barrett, on 4/27/2022. Enclosed is my consultation / progress note from that encounter. Thank you for allowing me to participate in the care of this patient.     Sincerely,                           Antony Willard DPM  [address], [address] [facility] [address]    Document electronically generated by:  Antony Wilalrd DPM on 4/27/2022    CC: No Recipients    Enclosure

## (undated) NOTE — LETTER
[place] [address]  Authorization for Invasive Procedures  Date: 1-           Time: ___    I hereby authorize Lisandra Long MD, my physician and his/her assistants (if applicable), which may include medical students, residents, and/or fellows, to perform the following surgical operation/ procedure and administer such anesthesia as may be determined necessary by my physician: Left Upper Extremity Angiogram with Aortic Arch, Left Subclavian Artery and Vertebral Artery Angiogram with Possible Intervention on Chester Castrolmalison Bautista  2.   I recognize that during the surgical operation/procedure, unforeseen conditions may necessitate additional or different procedures than those listed above.  I, therefore, further authorize and request that the above-named surgeon, assistants, or designees perform such procedures as are, in their judgment, necessary and desirable.    3.   My surgeon/physician has discussed prior to my surgery the potential benefits, risks and side effects of this procedure; the likelihood of achieving goals; and potential problems that might occur during recuperation.  They also discussed reasonable alternatives to the procedure, including risks, benefits, and side effects related to the alternatives and risks related to not receiving this procedure.  I have had all my questions answered and I acknowledge that no guarantee has been made as to the result that may be obtained.    4.   Should the need arise during my operation/procedure, which includes change of level of care prior to discharge, I also consent to the administration of blood and/or blood products.  Further, I understand that despite careful testing and screening of blood or blood products by collecting agencies, I may still be subject to ill effects as a result of receiving a blood transfusion and/or blood products.  The following are some, but not all, of the potential risks that can occur: fever and  allergic reactions, hemolytic reactions, transmission of diseases such as Hepatitis, AIDS and Cytomegalovirus (CMV) and fluid overload.  In the event that I wish to have an autologous transfusion of my own blood, or a directed donor transfusion, I will discuss this with my physician.   Check only if Refusing Blood or Blood Products  I understand refusal of blood or blood products as deemed necessary by my physician may have serious consequences to my condition to include possible death. I hereby assume responsibility for my refusal and release the hospital, its personnel, and my physicians from any responsibility for the consequences of my refusal.         o  Refuse         5.   I authorize the use of any specimen, organs, tissues, body parts or foreign objects that may be removed from my body during the operation/procedure for diagnosis, research or teaching purposes and their subsequent disposal by hospital authorities.  I also authorize the release of specimen test results and/or written reports to my treating physician on the hospital medical staff or other referring or consulting physicians involved in my care, at the discretion of the Pathologist or my treating physician.    6.   I consent to the photographing or videotaping of the operations or procedures to be performed, including appropriate portions of my body for medical, scientific, or educational purposes, provided my identity is not revealed by the pictures or by descriptive texts accompanying them.  If the procedure has been photographed/videotaped, the surgeon will obtain the original picture, image, videotape or CD.  The hospital will not be responsible for storage, release or maintenance of the picture, image, tape or CD.    7.   I consent to the presence of a  or observers in the operating room as deemed necessary by my physician or their designees.    8.   I recognize that in the event my procedure results in extended  X-Ray/fluoroscopy time, I may develop a skin reaction.    9. If I have a Do Not Attempt Resuscitation (DNAR) order in place, that status will be suspended while in the operating room, procedural suite, and during the recovery period unless otherwise explicitly stated by me (or a person authorized to consent on my behalf). The surgeon or my attending physician will determine when the applicable recovery period ends for purposes of reinstating the DNAR order.  10. Patients having a sterilization procedure: I understand that if the procedure is successful the results will be permanent and it will therefore be impossible for me to inseminate, conceive, or bear children.  I also understand that the procedure is intended to result in sterility, although the result has not been guaranteed.   11. I acknowledge that my physician has explained sedation/analgesia administration to me including the risk and benefits I consent to the administration of sedation/analgesia as may be necessary or desirable in the judgment of my physician.    I CERTIFY THAT I HAVE READ AND FULLY UNDERSTAND THE ABOVE CONSENT TO OPERATION and/or OTHER PROCEDURE.        ____________________________________       _________________________________      ______________________________  Signature of Patient         Signature of Responsible Person        Printed Name of Responsible Person        ____________________________________      _________________________________      ______________________________       Signature of Witness          Relationship to Patient                       Date                                       Time  Patient Name: Chester Bautista  : 1940    Reviewed: 2024   Printed: 2025  Medical Record #: [id number] Page 1 of 2             STATEMENT OF PHYSICIAN My signature below affirms that prior to the time of the procedure; I have explained to the patient and/or his/her legal representative, the risks  and benefits involved in the proposed treatment and any reasonable alternative to the proposed treatment. I have also explained the risks and benefits involved in refusal of the proposed treatment and alternatives to the proposed treatment and have answered the patient's questions. If I have a significant financial interest in a co-management agreement or a significant financial interest in any product or implant, or other significant relationship used in this procedure/surgery, I have disclosed this and had a discussion with my patient.     _______________________________________________________________ _____________________________  (Signature of Physician)                                                                                         (Date)                                   (Time)  Patient Name: Chester Bautista  : 1940    Reviewed: 2024   Printed: 2025  Medical Record #: [id number] Page 2 of 2

## (undated) NOTE — LETTER
[place] [address], [place], [place]  Authorization for Surgical Operation and Procedure                                                                                           I hereby authorize Zoila Mcqueen MD, my physician and his/her assistants (if applicable), which may include medical students, residents, and/or fellows, to perform the following surgical operation/ procedure and administer such anesthesia as may be determined necessary by my physician: Operation/Procedure name (s) CAPSULE ENDOSCOPY on Chester Bautista   2.   I recognize that during the surgical operation/procedure, unforeseen conditions may necessitate additional or different procedures than those listed above.  I, therefore, further authorize and request that the above-named surgeon, assistants, or designees perform such procedures as are, in their judgment, necessary and desirable.    3.   My surgeon/physician has discussed prior to my surgery the potential benefits, risks and side effects of this procedure; the likelihood of achieving goals; and potential problems that might occur during recuperation.  They also discussed reasonable alternatives to the procedure, including risks, benefits, and side effects related to the alternatives and risks related to not receiving this procedure.  I have had all my questions answered and I acknowledge that no guarantee has been made as to the result that may be obtained.    4.   Should the need arise during my operation/procedure, which includes change of level of care prior to discharge, I also consent to the administration of blood and/or blood products.  Further, I understand that despite careful testing and screening of blood or blood products by collecting agencies, I may still be subject to ill effects as a result of receiving a blood transfusion and/or blood products.  The following are some, but not all, of the potential risks that can occur: fever and  allergic reactions, hemolytic reactions, transmission of diseases such as Hepatitis, AIDS and Cytomegalovirus (CMV) and fluid overload.  In the event that I wish to have an autologous transfusion of my own blood, or a directed donor transfusion, I will discuss this with my physician.  Check only if Refusing Blood or Blood Products  I understand refusal of blood or blood products as deemed necessary by my physician may have serious consequences to my condition to include possible death. I hereby assume responsibility for my refusal and release the hospital, its personnel, and my physicians from any responsibility for the consequences of my refusal.    o  Refuse   5.   I authorize the use of any specimen, organs, tissues, body parts or foreign objects that may be removed from my body during the operation/procedure for diagnosis, research or teaching purposes and their subsequent disposal by hospital authorities.  I also authorize the release of specimen test results and/or written reports to my treating physician on the hospital medical staff or other referring or consulting physicians involved in my care, at the discretion of the Pathologist or my treating physician.    6.   I consent to the photographing or videotaping of the operations or procedures to be performed, including appropriate portions of my body for medical, scientific, or educational purposes, provided my identity is not revealed by the pictures or by descriptive texts accompanying them.  If the procedure has been photographed/videotaped, the surgeon will obtain the original picture, image, videotape or CD.  The hospital will not be responsible for storage, release or maintenance of the picture, image, tape or CD.    7.   I consent to the presence of a  or observers in the operating room as deemed necessary by my physician or their designees.    8.   I recognize that in the event my procedure results in extended X-Ray/fluoroscopy  time, I may develop a skin reaction.    9. If I have a Do Not Attempt Resuscitation (DNAR) order in place, that status will be suspended while in the operating room, procedural suite, and during the recovery period unless otherwise explicitly stated by me (or a person authorized to consent on my behalf). The surgeon or my attending physician will determine when the applicable recovery period ends for purposes of reinstating the DNAR order.  10. Patients having a sterilization procedure: I understand that if the procedure is successful the results will be permanent and it will therefore be impossible for me to inseminate, conceive, or bear children.  I also understand that the procedure is intended to result in sterility, although the result has not been guaranteed.   11. I acknowledge that my physician has explained sedation/analgesia administration to me including the risk and benefits I consent to the administration of sedation/analgesia as may be necessary or desirable in the judgment of my physician.    I CERTIFY THAT I HAVE READ AND FULLY UNDERSTAND THE ABOVE CONSENT TO OPERATION and/or OTHER PROCEDURE.     _________________________________________ _________________________________     ___________________________________  Signature of Patient     Signature of Responsible Person                   Printed Name of Responsible Person                              _________________________________________ ______________________________        ___________________________________  Signature of Witness         Date  Time         Relationship to Patient    STATEMENT OF PHYSICIAN My signature below affirms that prior to the time of the procedure; I have explained to the patient and/or his/her legal representative, the risks and benefits involved in the proposed treatment and any reasonable alternative to the proposed treatment. I have also explained the risks and benefits involved in refusal of the proposed treatment and  alternatives to the proposed treatment and have answered the patient's questions. If I have a significant financial interest in a co-management agreement or a significant financial interest in any product or implant, or other significant relationship used in this procedure/surgery, I have disclosed this and had a discussion with my patient.     _______________________________________________________________ _____________________________  (Signature of Physician)                                                                                         (Date)                                   (Time)  Patient Name: Chester Bautista    : 1940   Printed: 3/9/2024      Medical Record #: [id number]                                              Page 1 of 1

## (undated) NOTE — LETTER
[facility]  Administration of Anesthesia  I, Chester Bautista agree to be cared for by a physician anesthesiologist alone and/or with a nurse anesthetist, who is specially trained to monitor me and give me medicine to put me to sleep or keep me comfortable during my procedure    I understand that my anesthesiologist and/or anesthetist is not an employee or agent of [facility] or [employer]. He or she works for [facility]    As the patient asking for anesthesia services, I agree to:  Allow the anesthesiologist (anesthesia doctor) to give me medicine and do additional procedures as necessary. Some examples are: Starting or using an “IV” to give me medicine, fluids or blood during my procedure, and having a breathing tube placed to help me breathe when I’m asleep (intubation). In the event that my heart stops working properly, I understand that my anesthesiologist will make every effort to sustain my life, unless otherwise directed by [facility] Do Not Resuscitate documents.  Tell my anesthesia doctor before my procedure:  If I am pregnant.  The last time that I ate or drank.  iii. All of the medicines I take (including prescriptions, herbal supplements, and pills I can buy without a prescription (including street drugs/illegal medications). Failure to inform my anesthesiologist about these medicines may increase my risk of anesthetic complications.  iv.If I am allergic to anything or have had a reaction to anesthesia before.  I understand how the anesthesia medicine will help me (benefits).  I understand that with any type of anesthesia medicine there are risks:  The most common risks are: nausea, vomiting, sore throat, muscle soreness, damage to my eyes, mouth, or teeth (from breathing tube placement).  Rare risks include: remembering what happened during my procedure, allergic reactions to medications, injury to my airway, heart, lungs, vision,  nerves, or muscles and in extremely rare instances death.  My doctor has explained to me other choices available to me for my care (alternatives).  Pregnant Patients (“epidural”):  I understand that the risks of having an epidural (medicine given into my back to help control pain during labor), include itching, low blood pressure, difficulty urinating, headache or slowing of the baby’s heart. Very rare risks include infection, bleeding, seizure, irregular heart rhythms and nerve injury.  Regional Anesthesia (“spinal”, “epidural”, & “nerve blocks”):  I understand that rare but potential complications include headache, bleeding, infection, seizure, irregular heart rhythms, and nerve injury.    _____________________________________________________________________________  Patient (or Representative) Signature/Relationship to Patient  Date   Time    _____________________________________________________________________________   Name (if used)    Language/Organization   Time    _____________________________________________________________________________  Nurse Anesthetist Signature     Date   Time  _____________________________________________________________________________  Anesthesiologist Signature     Date   Time  I have discussed the procedure and information above with the patient (or patient’s representative) and answered their questions. The patient or their representative has agreed to have anesthesia services.    _____________________________________________________________________________  Witness        Date   Time  I have verified that the signature is that of the patient or patient’s representative, and that it was signed before the procedure  Patient Name: Chester Bautista     : 1940                 Printed: 2024 at 7:00 AM    Medical Record #: [id number]                                            Page 1 of 1  ----------ANESTHESIA CONSENT----------

## (undated) NOTE — LETTER
1/6/2023              [facility]        [address] [place] [address]         Dear [facility],    [facility] records indicate that the tests ordered for you by Vickey Ba MD  have not been done. If you have, in fact, already completed the tests or you do not wish to have the tests done, please contact our office at [address] [place] [address]. Otherwise, please proceed with the testing. Enclosed is a duplicate order for your convenience.     CT Chest     Sincerely,    Vickey Ba MD  [address] [place] [address]  [address] [place] [address]  [phone]

## (undated) NOTE — LETTER
[facility]  [address], [place], [place]  Authorization for Surgical Operation and Procedure                                                                                           I hereby authorize Zoila Mcqueen MD, my physician and his/her assistants (if applicable), which may include medical students, residents, and/or fellows, to perform the following surgical operation/ procedure and administer such anesthesia as may be determined necessary by my physician: Operation/Procedure name (s) ESOPHAGOGASTRODUODENOSCOPY (EGD) with PUSH ENTEROSCOPY on Chester Bautista   2.   I recognize that during the surgical operation/procedure, unforeseen conditions may necessitate additional or different procedures than those listed above.  I, therefore, further authorize and request that the above-named surgeon, assistants, or designees perform such procedures as are, in their judgment, necessary and desirable.    3.   My surgeon/physician has discussed prior to my surgery the potential benefits, risks and side effects of this procedure; the likelihood of achieving goals; and potential problems that might occur during recuperation.  They also discussed reasonable alternatives to the procedure, including risks, benefits, and side effects related to the alternatives and risks related to not receiving this procedure.  I have had all my questions answered and I acknowledge that no guarantee has been made as to the result that may be obtained.    4.   Should the need arise during my operation/procedure, which includes change of level of care prior to discharge, I also consent to the administration of blood and/or blood products.  Further, I understand that despite careful testing and screening of blood or blood products by collecting agencies, I may still be subject to ill effects as a result of receiving a blood transfusion and/or blood products.  The following are some, but not all, of the potential  risks that can occur: fever and allergic reactions, hemolytic reactions, transmission of diseases such as Hepatitis, AIDS and Cytomegalovirus (CMV) and fluid overload.  In the event that I wish to have an autologous transfusion of my own blood, or a directed donor transfusion, I will discuss this with my physician.  Check only if Refusing Blood or Blood Products  I understand refusal of blood or blood products as deemed necessary by my physician may have serious consequences to my condition to include possible death. I hereby assume responsibility for my refusal and release the hospital, its personnel, and my physicians from any responsibility for the consequences of my refusal.    o  Refuse   5.   I authorize the use of any specimen, organs, tissues, body parts or foreign objects that may be removed from my body during the operation/procedure for diagnosis, research or teaching purposes and their subsequent disposal by hospital authorities.  I also authorize the release of specimen test results and/or written reports to my treating physician on the hospital medical staff or other referring or consulting physicians involved in my care, at the discretion of the Pathologist or my treating physician.    6.   I consent to the photographing or videotaping of the operations or procedures to be performed, including appropriate portions of my body for medical, scientific, or educational purposes, provided my identity is not revealed by the pictures or by descriptive texts accompanying them.  If the procedure has been photographed/videotaped, the surgeon will obtain the original picture, image, videotape or CD.  The hospital will not be responsible for storage, release or maintenance of the picture, image, tape or CD.    7.   I consent to the presence of a  or observers in the operating room as deemed necessary by my physician or their designees.    8.   I recognize that in the event my procedure results in  extended X-Ray/fluoroscopy time, I may develop a skin reaction.    9. If I have a Do Not Attempt Resuscitation (DNAR) order in place, that status will be suspended while in the operating room, procedural suite, and during the recovery period unless otherwise explicitly stated by me (or a person authorized to consent on my behalf). The surgeon or my attending physician will determine when the applicable recovery period ends for purposes of reinstating the DNAR order.  10. Patients having a sterilization procedure: I understand that if the procedure is successful the results will be permanent and it will therefore be impossible for me to inseminate, conceive, or bear children.  I also understand that the procedure is intended to result in sterility, although the result has not been guaranteed.   11. I acknowledge that my physician has explained sedation/analgesia administration to me including the risk and benefits I consent to the administration of sedation/analgesia as may be necessary or desirable in the judgment of my physician.    I CERTIFY THAT I HAVE READ AND FULLY UNDERSTAND THE ABOVE CONSENT TO OPERATION and/or OTHER PROCEDURE.     _________________________________________ _________________________________     ___________________________________  Signature of Patient     Signature of Responsible Person                   Printed Name of Responsible Person                              _________________________________________ ______________________________        ___________________________________  Signature of Witness         Date  Time         Relationship to Patient    STATEMENT OF PHYSICIAN My signature below affirms that prior to the time of the procedure; I have explained to the patient and/or his/her legal representative, the risks and benefits involved in the proposed treatment and any reasonable alternative to the proposed treatment. I have also explained the risks and benefits involved in refusal of  the proposed treatment and alternatives to the proposed treatment and have answered the patient's questions. If I have a significant financial interest in a co-management agreement or a significant financial interest in any product or implant, or other significant relationship used in this procedure/surgery, I have disclosed this and had a discussion with my patient.     _______________________________________________________________ _____________________________  (Signature of Physician)                                                                                         (Date)                                   (Time)  Patient Name: Chester Bautista    : 1940   Printed: 2024      Medical Record #: [id number]                                              Page 1 of 1

## (undated) NOTE — LETTER
[place] [address]  Authorization for Invasive Procedures  Date: 2/26/24           Time: 3749    I hereby authorize Zoila Mcqueen MD, my physician and his/her assistants (if applicable), which may include medical students, residents, and/or fellows, to perform the following surgical operation/ procedure and administer such anesthesia as may be determined necessary by my physician: ESOPHAGOGASTRODUODENOSCOPY (EGD) with PUSH ENTEROSCOPY on Chester Bautista  2.   I recognize that during the surgical operation/procedure, unforeseen conditions may necessitate additional or different procedures than those listed above.  I, therefore, further authorize and request that the above-named surgeon, assistants, or designees perform such procedures as are, in their judgment, necessary and desirable.    3.   My surgeon/physician has discussed prior to my surgery the potential benefits, risks and side effects of this procedure; the likelihood of achieving goals; and potential problems that might occur during recuperation.  They also discussed reasonable alternatives to the procedure, including risks, benefits, and side effects related to the alternatives and risks related to not receiving this procedure.  I have had all my questions answered and I acknowledge that no guarantee has been made as to the result that may be obtained.    4.   Should the need arise during my operation/procedure, which includes change of level of care prior to discharge, I also consent to the administration of blood and/or blood products.  Further, I understand that despite careful testing and screening of blood or blood products by collecting agencies, I may still be subject to ill effects as a result of receiving a blood transfusion and/or blood products.  The following are some, but not all, of the potential risks that can occur: fever and allergic reactions, hemolytic reactions, transmission of diseases such as  Hepatitis, AIDS and Cytomegalovirus (CMV) and fluid overload.  In the event that I wish to have an autologous transfusion of my own blood, or a directed donor transfusion, I will discuss this with my physician.   Check only if Refusing Blood or Blood Products  I understand refusal of blood or blood products as deemed necessary by my physician may have serious consequences to my condition to include possible death. I hereby assume responsibility for my refusal and release the hospital, its personnel, and my physicians from any responsibility for the consequences of my refusal.         o  Refuse         5.   I authorize the use of any specimen, organs, tissues, body parts or foreign objects that may be removed from my body during the operation/procedure for diagnosis, research or teaching purposes and their subsequent disposal by hospital authorities.  I also authorize the release of specimen test results and/or written reports to my treating physician on the hospital medical staff or other referring or consulting physicians involved in my care, at the discretion of the Pathologist or my treating physician.    6.   I consent to the photographing or videotaping of the operations or procedures to be performed, including appropriate portions of my body for medical, scientific, or educational purposes, provided my identity is not revealed by the pictures or by descriptive texts accompanying them.  If the procedure has been photographed/videotaped, the surgeon will obtain the original picture, image, videotape or CD.  The hospital will not be responsible for storage, release or maintenance of the picture, image, tape or CD.    7.   I consent to the presence of a  or observers in the operating room as deemed necessary by my physician or their designees.    8.   I recognize that in the event my procedure results in extended X-Ray/fluoroscopy time, I may develop a skin reaction.    9. If I have a Do Not  Attempt Resuscitation (DNAR) order in place, that status will be suspended while in the operating room, procedural suite, and during the recovery period unless otherwise explicitly stated by me (or a person authorized to consent on my behalf). The surgeon or my attending physician will determine when the applicable recovery period ends for purposes of reinstating the DNAR order.  10. Patients having a sterilization procedure: I understand that if the procedure is successful the results will be permanent and it will therefore be impossible for me to inseminate, conceive, or bear children.  I also understand that the procedure is intended to result in sterility, although the result has not been guaranteed.   11. I acknowledge that my physician has explained sedation/analgesia administration to me including the risk and benefits I consent to the administration of sedation/analgesia as may be necessary or desirable in the judgment of my physician.    I CERTIFY THAT I HAVE READ AND FULLY UNDERSTAND THE ABOVE CONSENT TO OPERATION and/or OTHER PROCEDURE.        ____________________________________       _________________________________      ______________________________  Signature of Patient         Signature of Responsible Person        Printed Name of Responsible Person        ____________________________________      _________________________________      ______________________________       Signature of Witness          Relationship to Patient                       Date                                       Time    Patient Name: Chester Bautista     : 1940                 Printed: 2024      Medical Record #: [id number]                      Page 1 of 2          STATEMENT OF PHYSICIAN My signature below affirms that prior to the time of the procedure; I have explained to the patient and/or his/her legal representative, the risks and benefits involved in the proposed treatment and any  reasonable alternative to the proposed treatment. I have also explained the risks and benefits involved in refusal of the proposed treatment and alternatives to the proposed treatment and have answered the patient's questions. If I have a significant financial interest in a co-management agreement or a significant financial interest in any product or implant, or other significant relationship used in this procedure/surgery, I have disclosed this and had a discussion with my patient.     _______________________________________________________________ _____________________________  (Signature of Physician)                                                                                         (Date)                                   (Time)    Patient Name: Chester Bautista     : 1940                 Printed: 2024      Medical Record #: [id number]                      Page 2 of 2

## (undated) NOTE — LETTER
[place] [address]  Authorization for Invasive Procedures  Date: 03/07/2024           Time: 0918    I hereby authorize Dr. Bradley Boyle, my physician and his/her assistants (if applicable), which may include medical students, residents, and/or fellows, to perform the following surgical operation/ procedure and administer such anesthesia as may be determined necessary by my physician: Inferior Vena Cava Filter Placement on Chester Bautista  2.   I recognize that during the surgical operation/procedure, unforeseen conditions may necessitate additional or different procedures than those listed above.  I, therefore, further authorize and request that the above-named surgeon, assistants, or designees perform such procedures as are, in their judgment, necessary and desirable.    3.   My surgeon/physician has discussed prior to my surgery the potential benefits, risks and side effects of this procedure; the likelihood of achieving goals; and potential problems that might occur during recuperation.  They also discussed reasonable alternatives to the procedure, including risks, benefits, and side effects related to the alternatives and risks related to not receiving this procedure.  I have had all my questions answered and I acknowledge that no guarantee has been made as to the result that may be obtained.    4.   Should the need arise during my operation/procedure, which includes change of level of care prior to discharge, I also consent to the administration of blood and/or blood products.  Further, I understand that despite careful testing and screening of blood or blood products by collecting agencies, I may still be subject to ill effects as a result of receiving a blood transfusion and/or blood products.  The following are some, but not all, of the potential risks that can occur: fever and allergic reactions, hemolytic reactions, transmission of diseases such as Hepatitis, AIDS and  Cytomegalovirus (CMV) and fluid overload.  In the event that I wish to have an autologous transfusion of my own blood, or a directed donor transfusion, I will discuss this with my physician.   Check only if Refusing Blood or Blood Products  I understand refusal of blood or blood products as deemed necessary by my physician may have serious consequences to my condition to include possible death. I hereby assume responsibility for my refusal and release the hospital, its personnel, and my physicians from any responsibility for the consequences of my refusal.         o  Refuse         5.   I authorize the use of any specimen, organs, tissues, body parts or foreign objects that may be removed from my body during the operation/procedure for diagnosis, research or teaching purposes and their subsequent disposal by hospital authorities.  I also authorize the release of specimen test results and/or written reports to my treating physician on the hospital medical staff or other referring or consulting physicians involved in my care, at the discretion of the Pathologist or my treating physician.    6.   I consent to the photographing or videotaping of the operations or procedures to be performed, including appropriate portions of my body for medical, scientific, or educational purposes, provided my identity is not revealed by the pictures or by descriptive texts accompanying them.  If the procedure has been photographed/videotaped, the surgeon will obtain the original picture, image, videotape or CD.  The hospital will not be responsible for storage, release or maintenance of the picture, image, tape or CD.    7.   I consent to the presence of a  or observers in the operating room as deemed necessary by my physician or their designees.    8.   I recognize that in the event my procedure results in extended X-Ray/fluoroscopy time, I may develop a skin reaction.    9. If I have a Do Not Attempt Resuscitation  (DNAR) order in place, that status will be suspended while in the operating room, procedural suite, and during the recovery period unless otherwise explicitly stated by me (or a person authorized to consent on my behalf). The surgeon or my attending physician will determine when the applicable recovery period ends for purposes of reinstating the DNAR order.  10. Patients having a sterilization procedure: I understand that if the procedure is successful the results will be permanent and it will therefore be impossible for me to inseminate, conceive, or bear children.  I also understand that the procedure is intended to result in sterility, although the result has not been guaranteed.   11. I acknowledge that my physician has explained sedation/analgesia administration to me including the risk and benefits I consent to the administration of sedation/analgesia as may be necessary or desirable in the judgment of my physician.    I CERTIFY THAT I HAVE READ AND FULLY UNDERSTAND THE ABOVE CONSENT TO OPERATION and/or OTHER PROCEDURE.        ____________________________________       _________________________________      ______________________________  Signature of Patient         Signature of Responsible Person        Printed Name of Responsible Person        ____________________________________      _________________________________      ______________________________       Signature of Witness          Relationship to Patient                       Date                                       Time  Patient Name: Chester Bautista  : 1940    Reviewed: 2024   Printed: 2024  Medical Record #: [id number] Page 1 of 2             STATEMENT OF PHYSICIAN My signature below affirms that prior to the time of the procedure; I have explained to the patient and/or his/her legal representative, the risks and benefits involved in the proposed treatment and any reasonable alternative to the proposed treatment.  I have also explained the risks and benefits involved in refusal of the proposed treatment and alternatives to the proposed treatment and have answered the patient's questions. If I have a significant financial interest in a co-management agreement or a significant financial interest in any product or implant, or other significant relationship used in this procedure/surgery, I have disclosed this and had a discussion with my patient.     _______________________________________________________________ _____________________________  (Signature of Physician)                                                                                         (Date)                                   (Time)  Patient Name: Chester Bautista  : 1940    Reviewed: 2024   Printed: 2024  Medical Record #: [id number] Page 2 of 2

## (undated) NOTE — Clinical Note
[facility]  [address], [place], [place]    Authorization for Surgical Operation and Procedure                               1. I hereby authorize * Surgery not found *, my physician and his/her assistants (if applicable), which may include medical students, residents, and/or fellows, to perform the following surgical operation/ procedure and administer such anesthesia as may be determined necessary by my physician: Operation/Procedure name (s)  on Chester Bautista   2.   I recognize that during the surgical operation/procedure, unforeseen conditions may necessitate additional or different procedures than those listed above.  I, therefore, further authorize and request that the above-named surgeon, assistants, or designees perform such procedures as are, in their judgment, necessary and desirable.    3.   My surgeon/physician has discussed prior to my surgery the potential benefits, risks and side effects of this procedure; the likelihood of achieving goals; and potential problems that might occur during recuperation.  They also discussed reasonable alternatives to the procedure, including risks, benefits, and side effects related to the alternatives and risks related to not receiving this procedure.  I have had all my questions answered and I acknowledge that no guarantee has been made as to the result that may be obtained.    4.   Should the need arise during my operation/procedure, which includes change of level of care prior to discharge, I also consent to the administration of blood and/or blood products.  Further, I understand that despite careful testing and screening of blood or blood products by collecting agencies, I may still be subject to ill effects as a result of receiving a blood transfusion and/or blood products.  The following are some, but not all, of the potential risks that can occur: fever and allergic reactions, hemolytic reactions, transmission of diseases such as  Hepatitis, AIDS and Cytomegalovirus (CMV) and fluid overload.  In the event that I wish to have an autologous transfusion of my own blood, or a directed donor transfusion, I will discuss this with my physician.  Check only if Refusing Blood or Blood Products  I understand refusal of blood or blood products as deemed necessary by my physician may have serious consequences to my condition to include possible death. I hereby assume responsibility for my refusal and release the hospital, its personnel, and my physicians from any responsibility for the consequences of my refusal.    o  Refuse   5.   I authorize the use of any specimen, organs, tissues, body parts or foreign objects that may be removed from my body during the operation/procedure for diagnosis, research or teaching purposes and their subsequent disposal by hospital authorities.  I also authorize the release of specimen test results and/or written reports to my treating physician on the hospital medical staff or other referring or consulting physicians involved in my care, at the discretion of the Pathologist or my treating physician.    6.   I consent to the photographing or videotaping of the operations or procedures to be performed, including appropriate portions of my body for medical, scientific, or educational purposes, provided my identity is not revealed by the pictures or by descriptive texts accompanying them.  If the procedure has been photographed/videotaped, the surgeon will obtain the original picture, image, videotape or CD.  The hospital will not be responsible for storage, release or maintenance of the picture, image, tape or CD.    7.   I consent to the presence of a  or observers in the operating room as deemed necessary by my physician or their designees.    8.   I recognize that in the event my procedure results in extended X-Ray/fluoroscopy time, I may develop a skin reaction.    9. If I have a Do Not Attempt  Resuscitation (DNAR) order in place, that status will be suspended while in the operating room, procedural suite, and during the recovery period unless otherwise explicitly stated by me (or a person authorized to consent on my behalf). The surgeon or my attending physician will determine when the applicable recovery period ends for purposes of reinstating the DNAR order.  10. Patients having a sterilization procedure: I understand that if the procedure is successful the results will be permanent and it will therefore be impossible for me to inseminate, conceive, or bear children.  I also understand that the procedure is intended to result in sterility, although the result has not been guaranteed.   11. I acknowledge that my physician has explained sedation/analgesia administration to me including the risk and benefits I consent to the administration of sedation/analgesia as may be necessary or desirable in the judgment of my physician.    I CERTIFY THAT I HAVE READ AND FULLY UNDERSTAND THE ABOVE CONSENT TO OPERATION and/or OTHER PROCEDURE.     ____________________________________  _________________________________        ______________________________  Signature of Patient    Signature of Responsible Person                Printed Name of Responsible Person                                      ____________________________________  _____________________________                ________________________________  Signature of Witness        Date  Time         Relationship to Patient    STATEMENT OF PHYSICIAN My signature below affirms that prior to the time of the procedure; I have explained to the patient and/or his/her legal representative, the risks and benefits involved in the proposed treatment and any reasonable alternative to the proposed treatment. I have also explained the risks and benefits involved in refusal of the proposed treatment and alternatives to the proposed treatment and have answered the patient's  questions. If I have a significant financial interest in a co-management agreement or a significant financial interest in any product or implant, or other significant relationship used in this procedure/surgery, I have disclosed this and had a discussion with my patient.     _____________________________________________________              _____________________________  (Signature of Physician)                                                                                         (Date)                                   (Time)  Patient Name: Chester Bautista      : 1940      Printed: 2025     Medical Record #: [id number]                                      Page 1 of 1

## (undated) NOTE — LETTER
[facility]  Administration of Anesthesia  I, Chester Bautista agree to be cared for by a physician anesthesiologist alone and/or with a nurse anesthetist, who is specially trained to monitor me and give me medicine to put me to sleep or keep me comfortable during my procedure    I understand that my anesthesiologist and/or anesthetist is not an employee or agent of [facility] or [facility]. He or she works for [facility]    As the patient asking for anesthesia services, I agree to:  Allow the anesthesiologist (anesthesia doctor) to give me medicine and do additional procedures as necessary. Some examples are: Starting or using an “IV” to give me medicine, fluids or blood during my procedure, and having a breathing tube placed to help me breathe when I’m asleep (intubation). In the event that my heart stops working properly, I understand that my anesthesiologist will make every effort to sustain my life, unless otherwise directed by [facility] Do Not Resuscitate documents.  Tell my anesthesia doctor before my procedure:  If I am pregnant.  The last time that I ate or drank.  iii. All of the medicines I take (including prescriptions, herbal supplements, and pills I can buy without a prescription (including street drugs/illegal medications). Failure to inform my anesthesiologist about these medicines may increase my risk of anesthetic complications.  iv.If I am allergic to anything or have had a reaction to anesthesia before.  I understand how the anesthesia medicine will help me (benefits).  I understand that with any type of anesthesia medicine there are risks:  The most common risks are: nausea, vomiting, sore throat, muscle soreness, damage to my eyes, mouth, or teeth (from breathing tube placement).  Rare risks include: remembering what happened during my procedure, allergic reactions to medications, injury to my airway, heart, lungs, vision,  nerves, or muscles and in extremely rare instances death.  My doctor has explained to me other choices available to me for my care (alternatives).  Pregnant Patients (“epidural”):  I understand that the risks of having an epidural (medicine given into my back to help control pain during labor), include itching, low blood pressure, difficulty urinating, headache or slowing of the baby’s heart. Very rare risks include infection, bleeding, seizure, irregular heart rhythms and nerve injury.  Regional Anesthesia (“spinal”, “epidural”, & “nerve blocks”):  I understand that rare but potential complications include headache, bleeding, infection, seizure, irregular heart rhythms, and nerve injury.    _____________________________________________________________________________  Patient (or Representative) Signature/Relationship to Patient  Date   Time    _____________________________________________________________________________   Name (if used)    Language/Organization   Time    _____________________________________________________________________________  Nurse Anesthetist Signature     Date   Time  _____________________________________________________________________________  Anesthesiologist Signature     Date   Time  I have discussed the procedure and information above with the patient (or patient’s representative) and answered their questions. The patient or their representative has agreed to have anesthesia services.    _____________________________________________________________________________  Witness        Date   Time  I have verified that the signature is that of the patient or patient’s representative, and that it was signed before the procedure  Patient Name: Chester Bautista     : 1940                 Printed: 2024 at 7:20 AM    Medical Record #: [id number]                                            Page 1 of 1  ----------ANESTHESIA CONSENT----------

## (undated) NOTE — LETTER
July 17, 2024      No Recipients     Patient: Chester Bautista   YOB: 1940   Date of Visit: 7/17/2024       Dear Dr. Cox Recipients:    Thank you for referring Chester Bautista to me for evaluation. Here is my assessment and plan of care:    Chester Bautista is a 84 year old female.    HPI:     HPI    EP/ here for a DM and Plaquenil eye exam with photos.   Pt on Plaquenil for rheumatoid arthritis for about 1  year.   States the vision seems stable and no changes noted.   C/O eyes mostly red with mucus for which she uses Refresh eye drops 2-3 times a day that does not seems to help much.     Pt has been a diabetic for 3 years       Pt's diabetes is currently controlled by insulin  Pt checks BS is monitored regularly    Pt's last blood sugar was  68 mg/dl on 07/17/24  Last HA1C was 9.0 on 05/21/24  Endocrinologist: Ariane KHAN         Last edited by Ting Wynn OT on 7/17/2024  2:16 PM.        Patient History:  Past Medical History:    Anxiety state    Cancer (HCC)    breast cancer 2018    Chronic obstructive pulmonary disease, unspecified (HCC)    Chronic obstructive pulmonary disease, unspecified (HCC)    COPD (chronic obstructive pulmonary disease) (HCC)    Diabetes (HCC)    Diabetes mellitus (HCC)    Essential hypertension    Exposure to medical diagnostic radiation    High blood pressure    High cholesterol    History of blood transfusion    low hemoglobin    Osteoarthritis    PONV (postoperative nausea and vomiting)    Pulmonary embolism (HCC)    Pulmonary emphysema (HCC)    Rheumatoid arthritis (HCC)       Surgical History: Chester Bautista has a past surgical history that includes correct bunion,simple (right foot  1993); Trigger finger release (left hand  2005); hysterectomy (1972, no abnormal Paps, due to heavy bleeding with fibroids.  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 Fournier.) (Fredy fundoplication at [facility] Dr. Fournier.); total knee replacement (Left, 2015); colonoscopy; lumpectomy right; colonoscopy (N/A, 2022) (Procedure: COLONOSCOPY;  Surgeon: Mikey Garcia MD;  Location: [facility] ENDOSCOPY); Cataract extraction w/  intraocular lens implant (Bilateral, ) [facility] Yag Capsulotomy - OU - Both Eyes (Bilateral, 2021) (done in [place]); radiation right; colonoscopy (N/A, 06/15/2023) (Procedure: COLONOSCOPY;  Surgeon: Mikey Garcia MD;  Location: [facility] ENDOSCOPY); and egd (2023) (Dr. Garcia; Duodenal AVM's x4 cauterized).    Family History   Problem Relation Age of Onset    Heart Surgery Mother         Leaky valve at 39 y/o.     Prostate Cancer Brother     Cancer Brother     Diabetes Maternal Grandmother     Diabetes Paternal Aunt     Breast Cancer Self 78    Breast Cancer Niece         before 50    Macular degeneration Neg     Glaucoma Neg        Social History:   Social History     Socioeconomic History    Marital status:    Tobacco Use    Smoking status: Former     Current packs/day: 0.00     Types: Cigarettes     Quit date:      Years since quittin.5     Passive exposure: Never    Smokeless tobacco: Never    Tobacco comments:     [id number]   Vaping Use    Vaping status: Never Used   Substance and Sexual Activity    Alcohol use: Never    Drug use: Never   Social History Narrative    Just moved in from [place].   passed away and that is why moved from [place] to be with daughter who lives in [place].  Currently lives with daughter.     Social Determinants of Health     Financial Resource Strain: Low Risk  (2024)    Financial Resource Strain     Difficulty of Paying Living  Expenses: Not hard at all     Med Affordability: No   Food Insecurity: No Food Insecurity (2/23/2024)    Food Insecurity     Food Insecurity: Never true   Transportation Needs: No Transportation Needs (2/23/2024)    Transportation Needs     Lack of Transportation: No   Housing Stability: Low Risk  (2/23/2024)    Housing Stability     Housing Instability: No       Medications:  Current Outpatient Medications   Medication Sig Dispense Refill    Potassium Chloride ER 20 MEQ Oral Tab CR Take 40 mEq by mouth every morning. 6 tablet 0    BD PEN NEEDLE KADEN 2ND GEN 32G X 4 MM Does not apply Misc 1 EACH DAILY. USE DAILY WITH INSULIN      OMEPRAZOLE 20 MG Oral Capsule Delayed Release TAKE 1 CAPSULE BY MOUTH EVERY DAY IN THE MORNING 90 capsule 1    torsemide 20 MG Oral Tab Take 1 tablet (20 mg total) by mouth daily.      Insulin Aspart Prot & Aspart (INSULIN ASP PROT & ASP FLEXPEN) (70-30) 100 UNIT/ML Subcutaneous Suspension Pen-injector Inject 12 Units into the skin daily with lunch. (Patient taking differently: Inject 24 Units into the skin daily with lunch.) 9 mL 0    Insulin Pen Needle 33G X 4 MM Does not apply Misc 1 each daily. Use daily with insulin 100 each 0    Alcohol Swabs (ALCOHOL PREP) Does not apply Pads 1 each daily. 100 each 0    glipiZIDE 5 MG Oral Tab Take 2 tablets (10 mg total) by mouth before breakfast. 180 tablet 0    valsartan 80 MG Oral Tab Take 1 tablet (80 mg total) by mouth daily. 90 tablet 3    cyclobenzaprine 10 MG Oral Tab Take 1 tablet (10 mg total) by mouth nightly as needed. 90 tablet 1    tamsulosin 0.4 MG Oral Cap Take 1 capsule (0.4 mg total) by mouth daily. 90 capsule 1    carvedilol 12.5 MG Oral Tab Take 1 tablet (12.5 mg total) by mouth 2 (two) times daily with meals. 180 tablet 3    predniSONE 5 MG Oral Tab Take 1 tablet (5 mg total) by mouth daily. 90 tablet 1    fluticasone-salmeterol (WIXELA INHUB) 250-50 MCG/ACT Inhalation Aerosol Powder, Breath Activated Inhale 1 puff into the  lungs every 12 (twelve) hours. 1 each 5    Lancets Does not apply Misc Dx. E11.9. Checks qam. 50 each 6    Blood Glucose Monitoring Suppl (BLOOD GLUCOSE SYSTEM GILLES) Does not apply Kit Dx. E11.9. Checks qam. 1 kit 0    Blood Glucose Monitoring Suppl w/Device Does not apply Kit Dx. E11.9. Checks qam. 1 kit 0    Blood Gluc Meter Disp-Strips Does not apply Device Dx. E11.9. Checks qam. 100 each 0    gabapentin 100 MG Oral Cap Take 1 capsule (100 mg total) by mouth nightly. 90 capsule 3    midodrine 2.5 MG Oral Tab Take 1 tablet (2.5 mg total) by mouth in the morning and 1 tablet (2.5 mg total) at noon and 1 tablet (2.5 mg total) in the evening. Hold if BP > 120/80.. 90 tablet 0    LORATADINE 10 MG Oral Tab TAKE 1 TABLET BY MOUTH EVERY DAY 90 tablet 1    Accu-Chek FastClix Lancets Does not apply Misc Check sugar once daily as directed (E11.42) 100 each 2    hydroxychloroquine 200 MG Oral Tab Take 1 tablet (200 mg total) by mouth daily. 90 tablet 1    Glucose Blood (ACCU-CHEK GUIDE) In Vitro Strip Check blood sugar twice a day (fasting and before dinner). DX: E11.42, NIDDM 200 strip 1    rosuvastatin (CRESTOR) 20 MG Oral Tab Take 1 tablet (20 mg total) by mouth nightly. 90 tablet 0    albuterol 108 (90 Base) MCG/ACT Inhalation Aero Soln inhale 2 puff by inhalation route  every 4 - 6 hours as needed 1 each 0    montelukast 10 MG Oral Tab Take 1 tablet (10 mg total) by mouth nightly. 90 tablet 3    albuterol 108 (90 Base) MCG/ACT Inhalation Aero Soln inhale 2 puff by inhalation route  every 4 - 6 hours as needed 1 each 5    Blood Glucose Monitoring Suppl (ONETOUCH VERIO) w/Device Does not apply Kit 1 each daily. Test 1x daily 1 kit 0    Glucose Blood (ONETOUCH VERIO) In Vitro Strip Test 1x daily 100 strip 1    OneTouch Delica Lancets 33G Does not apply Misc 4 x daily 100 each 1    Blood Glucose Monitoring Suppl (BLOOD GLUCOSE SYSTEM GILLES) Does not apply Kit DX E 11.9.  Checks every morning. 1 kit 0    Lancets Does not apply  Misc DX E 11.9.  Checks every morning. 50 each 11    Blood Glucose Monitoring Suppl w/Device Does not apply Kit DX E 11.9.  Checks every morning. 1 kit 0    Blood Gluc Meter Disp-Strips Does not apply Device DX E 11.9.  Checks every morning. 100 each 11    acetaminophen 500 MG Oral Tab Take 1 tablet (500 mg total) by mouth daily.      glycopyrrolate 1 MG Oral Tab Take 1 tablet (1 mg total) by mouth 3 (three) times daily. (Patient not taking: Reported on 7/17/2024) 90 tablet 1       Allergies:  Allergies   Allergen Reactions    Celebrex [Celecoxib] PALPITATIONS    Penicillins ITCHING and SWELLING    Metformin And Related UNKNOWN    Olmesartan UNKNOWN       ROS:       PHYSICAL EXAM:     Base Eye Exam       Visual Acuity (Snellen - Linear)         Right Left    Dist sc 20/25 -2 20/30    Dist ph sc 20/20 -1 20/25 +2    Near cc 20/20 20/20              Tonometry (Applanation, 2:02 PM)         Right Left    Pressure 14 16              Pachymetry (7/30/2022)         Right Left    Thickness 574/-2 565/-1              Pupils         Pupils    Right PERRL    Left PERRL              Visual Fields         Left Right     Full Full              Extraocular Movement         Right Left     Full Full              Dilation       Both eyes: 1.0% Mydriacyl and 2.5% Alvin Synephrine @ 2:03 PM              Dilation #2       Both eyes: 1.0% Mydriacyl and 2.5% Alvin Synephrine @ 2:03 PM                  Additional Tests       Amsler         Right Left     Normal Normal              Color         Right Left    Ishihara 8/8 8/8                  Slit Lamp and Fundus Exam       Slit Lamp Exam         Right Left    Lids/Lashes Dermatochalasis, Meibomian gland dysfunction, extropion RLL Dermatochalasis, Meibomian gland dysfunction    Conjunctiva/Sclera Normal Conjunctivochalasis    Cornea 2+ arcus 2+ arcus    Anterior Chamber Deep and quiet Deep and quiet    Iris Normal Normal    Lens PC IOL slightly decentered temporally with YAG PC IOL with  YAG     Vitreous Vitreous floaters Vitreous floaters              Fundus Exam         Right Left    Disc Sloping margin, Temporal crescent Sloping margin, Temporal crescent    C/D Ratio 0.8 0.85    Macula Normal-no BDR, no Plaquenil Toxicity Normal-no BDR, no Plaquenil Toxicity    Vessels Normal Normal    Periphery Normal Normal                  Lacrimal Exam       Schirmers         Right Left     3 mm 2 mm      Anesthesia: Yes                  Refraction       Wearing Rx         Sphere Cylinder    Right +2.00 Sphere    Left +2.00 Sphere      Type: Forgot OTC reading only              Manifest Refraction    Declines refraction, states will get her glasses Rx where her vision insurance is covered.                    ASSESSMENT/PLAN:     Diagnoses and Plan:     Type 2 diabetes mellitus without retinopathy (HCC)  Diabetes type II: no background of retinopathy, no signs of neovascularization noted.  Discussed ocular and systemic benefits of blood sugar control.  Diagnosis and treatment discussed in detail with patient.    Glaucoma suspect of both eyes  Discussed with patient that she is a glaucoma suspect based on increased cupping of the optic nerves in both eyes.   Retinal photos taken today to document optic nerves.  Glaucoma diagnostic testing ordered.  Will not start medication, but will continue to observe.  Patient verbalized understanding.    Will see patient for next available visual field, and OCT with no MD, if glaucoma diagnostics are normal will see patient in 1 year for a diabetic/Plaquenil  exam        High risk medication use   No evidence of plaquenil toxicity in either eye.  Will follow in 1 year for a plaquenil eye exam based on current guidelines.   Patient is approved to continue on plaquenil as directed by their PCP or rheumatologist.     Pseudophakia of both eyes  No treatment  Continue with OTC reading glasses    Vitreous floaters of both eyes   There is no evidence of retinal pathology.  All signs  and symptoms of retinal detachment/tears explained in detail.    Patient instructed to call the office if they experience increase in floaters, increase in flashes of light, loss of vision or curtain or veil effect.       Dry eye syndrome of both eyes  Schirmer's test today revealed dry eyes.  Patient was instructed to use warm compresses to the eyelids twice a day everyday.    Instructions for warm compress use:   Patient should place wash compresses on both eyelids for 5 minutes every morning and every night.  After 5 minutes of holding the warm compresses on the eyelids, patient should gently rub the eyelashes and then rinse thoroughly with warm water.   Patient should also use artificial tears (any over the counter brand is okay) up to 4-6  times per day as needed for dry eye symptoms.      Orders Placed This Encounter   Procedures    Fundus Photos - OU - Both Eyes    OCT, Optic Nerve - OU - Both Eyes    Smith Visual Field - OU - Both Eyes       Meds This Visit:  Requested Prescriptions      No prescriptions requested or ordered in this encounter        Follow up instructions:  Return for Next Avail VF, OCT with no MD, If glaucoma diagnostics are wnl, 1 year diab/plaquenil EE.    7/17/2024  Scribed by: Quinton Tesfaye MD        If you have questions, please do not hesitate to call me. I look forward to following Chester along with you.    Sincerely,        Quinton Tesfaye MD        CC:   No Recipients    Document electronically generated by: Quinton Tesfaye MD

## (undated) NOTE — MR AVS SNAPSHOT
After Visit Summary   7/12/2023    Lynn Mooney   MRN: [id number]           Visit Information     Date & Time  7/12/2023  3:00 PM Provider  EM RUTH HELMSN [address] - Infusion Dept. Phone  [phone]      Allergies as of 7/12/2023  Review status set to In Progress on 7/12/2023       Noted Reaction Type Reactions    Celebrex [celecoxib] 11/05/2021    PALPITATIONS    Penicillins 11/05/2021    ITCHING, SWELLING    Metformin And Related 07/21/2015    UNKNOWN    Olmesartan 07/21/2015    UNKNOWN      Your Current Medications        Dosage    omeprazole 20 MG Oral Capsule Delayed Release Take 1 capsule (20 mg total) by mouth every morning. leflunomide 20 MG Oral Tab Take 1 tablet (20 mg total) by mouth daily. LORATADINE 10 MG Oral Tab TAKE 1 TABLET BY MOUTH EVERY DAY    glycopyrrolate 1 MG Oral Tab Take 1 tablet (1 mg total) by mouth 3 (three) times daily. carvedilol 25 MG Oral Tab Take 1 tablet (25 mg total) by mouth 2 (two) times daily with meals. Glucose Blood (ACCU-CHEK GUIDE) In Vitro Strip Check blood sugar twice a day (fasting and before dinner). DX: E11.42, NIDDM    BIOTIN OR Take by mouth. glipiZIDE 5 MG Oral Tab Take 1 tablet (5 mg total) by mouth 2 (two) times daily before meals. cyclobenzaprine 10 MG Oral Tab Take 1 tablet (10 mg total) by mouth nightly. Patient is taking one tablet by mouth nightly as needed for muscle spasms. valsartan 80 MG Oral Tab Take 1 tablet (80 mg total) by mouth daily. spironolactone 25 MG Oral Tab Take 2 tablets (50 mg total) by mouth daily. methylPREDNISolone 4 MG Oral Tablet Therapy Pack Take as directed on package. hydroxychloroquine 200 MG Oral Tab Take 1 tablet (200 mg total) by mouth daily. fluticasone-salmeterol (Darra Boxer) 250-50 MCG/ACT Inhalation Aerosol Powder, Breath Activated Inhale 1 puff into the lungs every 12 (twelve) hours.     albuterol 108 (90 Base) MCG/ACT Inhalation Aero Soln inhale 2 puff by inhalation route  every 4 - 6 hours as needed    traMADol 50 MG Oral Tab Take 1 tablet (50 mg total) by mouth every 12 (twelve) hours as needed for Pain. predniSONE 10 MG Oral Tab Take 30 mg for 2 days then 20 mg for 2 days then 10 mg for one day and after that continue with your regular    montelukast (SINGULAIR) 10 MG Oral Tab Take 1 tablet (10 mg total) by mouth nightly. atorvastatin 80 MG Oral Tab Take 1 tablet (80 mg total) by mouth daily. Accu-Chek FastClix Lancets Does not apply Misc Check sugar once daily as directed    Blood Glucose Monitoring Suppl (ONETOUCH VERIO) w/Device Does not apply Kit 1 each daily. Test 1x daily    Glucose Blood (ONETOUCH VERIO) In Vitro Strip Test 1x daily    OneTouch Delica Lancets 10K Does not apply Misc 4 x daily    Blood Glucose Monitoring Suppl (BLOOD GLUCOSE SYSTEM GILLES) Does not apply Kit DX E 11.9. Checks every morning. Lancets Does not apply Misc DX E 11.9. Checks every morning. Blood Glucose Monitoring Suppl w/Device Does not apply Kit DX E 11.9. Checks every morning. Blood Gluc Meter Disp-Strips Does not apply Device DX E 11.9. Checks every morning. acetaminophen 500 MG Oral Tab Take 1 tablet (500 mg total) by mouth every 6 (six) hours as needed for Pain. clonazePAM 0.5 MG Oral Tab Take 1 tablet (0.5 mg total) by mouth 2 (two) times daily as needed for Anxiety. Patient is taking one tablet by mouth every night at bedtime as needed for anxiety. aspirin 81 MG Oral Tab EC Take 325 mg by mouth daily.       Diagnoses for This Visit    Encounter for care related to vascular access port   [[id number]]  -  Primary           Future Appointments        Provider Department    7/13/2023 1:45 PM Mera, [address], [address], [place]    8/10/2023 12:40 PM Winter Goodrich [address] [place], [place]    8/14/2023 11:00 AM SALVADOR Griffin 8/14/2023 12:30 PM Karina [facility]    8/25/2023 1:00 PM Macario, [address], [place], [place]    9/19/2023 1:45 PM Elena, [address], & [address]    9/20/2023 2:40 PM Kennethsantos Leonjavier [facility], [address] [facility], [place]    10/18/2023 1:45 PM Brittny [facility], [facility], [place]                Did you know that [facility] primary care physicians now offer Video Visits through [address] for adult patients for a variety of conditions such as allergies, back pain and cold symptoms? Skip the drive and waiting room and online chat with a doctor face-to-face using your web-cam enabled computer or mobile device wherever you are. Video Visits cost $50 and can be paid hassle-free using a credit, debit, or health savings card. Not active on Medication Review? Ask us how to get signed up today! If you receive a survey from [employer], please take a few minutes to complete it and provide feedback. We strive to deliver the best patient experience and are looking for ways to make improvements. Your feedback will help us do so. For more information on [employer], please visit [email or web address]           No text in SmartText           No text in SmartText

## (undated) NOTE — LETTER
[place] [address]  Authorization for Invasive Procedures  Date: ***           Time: ***    {[facility] ivs consent:[id number]}

## (undated) NOTE — Clinical Note
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 Planning a capsule to evaluate the small intestine but wanted to see if thought to be any utility for starting on octreotide to see if this would help slow down AVM formation and bleeding.  Thanks  Talib

## (undated) NOTE — Clinical Note
Initial assessment completed with patient's dtr, Brea. TCM/DESI appointment confirms for this afternoon, with you. Dtr agrees to additional follow-up calls from nurse care manager.  Thank you!  Future Appointments 10/22/2024 1:45 PM    Alana Flores MD         ECHNDIM             [facility] 10/24/2024 2:30 PM    EM CC LAB2                 EM CHEMO           [facility] 10/31/2024 1:30 PM    Chapin Parker MD       [email or web address]           [facility] MOB 10/31/2024 3:30 PM    EM CC LAB2                 [facility] CHEMO           EMO 11/1/2024  1:15 PM    EM CC LAB2                 [facility] CHEMO           EMO 11/6/2024  2:00 PM    Hannah Encinas MD            [id number]        [facility] 11/13/2024 1:00 PM    Vic Thakkar APRN       ECADOENDO           [facility] ADO 11/20/2024 12:40 PM   Ashia Goodrich MD           [email or web address]          [facility] 11/21/2024 2:30 PM    Ladonna Luis APRN      [facility] HEM ONC         EMO 11/29/2024 2:30 PM    EM CC LAB1                 [facility] CHEMO           EMO

## (undated) NOTE — LETTER
Patient Name: Jovon Goyal  : 1940  MRN: [id number]  Patient Address: [address] [place] [address]      Coronavirus Disease 2019 (COVID-19)     [facility] is committed to the safety and well-being of our patients, symptoms carefully. If your symptoms get worse, call your healthcare provider immediately. 3. Get rest and stay hydrated. 4. If you have a medical appointment, call the healthcare provider ahead of time and tell them that you have or may have COVID-19. without the use of fever-reducing medications; and  · Improvement in respiratory symptoms (e.g., cough, shortness of breath); and  · At least 10 days have passed since symptoms first appeared OR if asymptomatic patient or date of symptom onset is unclear t convalescent plasma donors must:    · Have had a confirmed diagnosis of COVID-19  · Be symptom-free for at least 14 days*    *Some people will be required to have a repeat COVID-19 test in order to be eligible to donate.  If you’re instructed by Igor Zhou rahul Post-COVID conditions to be random. Researchers are trying to identify similarities between people with a Post-COVID condition to better understand if there are risk factors. How do I prevent a Post-COVID condition?   The best way to prevent the long-t

## (undated) NOTE — LETTER
July 13, 2023      No Recipients     Patient: Maranda Bloden   YOB: 1940   Date of Visit: 7/13/2023       Dear Dr. Mani Liu Recipients: Thank you for referring Michi Agrawal to me for evaluation. Here is my assessment and plan of care:    Maranda Bolden is a 79year old female. HPI:     HPI    Pt in today for a diabetic and Plaquenil eye exam. Pt was started on Plaquenil about 2 months ago for Rheumatoid Arthritis and follows up with Dr. Reji Walden every 3 months. Pt states vision is stable with OTC readers. Pt has been a diabetic for 4-5 years       Pt's diabetes is currently controlled by pills   Pt checks BS 2x a day    Pt's last blood sugar was 151 yesterday  Last HA1C was 8.1 on 6/29/23  Endocrinologist: Jeffery Carrillo [address] [place] [address]     Consult: per Dr. Susi Ga  Last edited by Taryn Mckinnon OT on 7/13/2023  1:53 PM.        Patient History:  Past Medical History:   Diagnosis Date    Anxiety state     Cancer [facility])     breast cancer 2018    Chronic obstructive pulmonary disease, unspecified ([facility] 05/04/2023    Diabetes ([facility]     Essential hypertension     Exposure to medical diagnostic radiation     High blood pressure     High cholesterol     History of blood transfusion 07/2022    low hemoglobin    Osteoarthritis     PONV (postoperative nausea and vomiting)     Pulmonary emphysema (HCC)     Rheumatoid arthritis ([facility]        Surgical History: Maranda Bolden has a past surgical history that includes correct bunion,simple (right foot  1993); Trigger finger release (left hand  2005); hysterectomy (1972, no abnormal Paps, due to heavy bleeding with fibroids.   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 Marianne Clark.) (Fredy fundoplication at [facility] Dr. Marianne Clark.); total knee replacement (Left, 2015); colonoscopy; lumpectomy right; colonoscopy (N/A, 2022) (Procedure: COLONOSCOPY;  Surgeon: Ryann Yoder MD;  Location: [address] [place] [address]); Cataract extraction w/  intraocular lens implant (Bilateral, ) (Dr in [address] [place] [address] ); Yag Capsulotomy - OU - Both Eyes (Bilateral, 2021) (done in [address] [place] [address]); radiation right; and colonoscopy (N/A, 6/15/2023) (Procedure: COLONOSCOPY;  Surgeon: Ryann Yoder MD;  Location: [facility]). Family History   Problem Relation Age of Onset    Breast Cancer Self 77    Heart Surgery Mother         Leaky valve at 35 y/o. Diabetes Maternal Grandmother     Diabetes Paternal Aunt     Breast Cancer Niece         before 48    Macular degeneration Neg     Glaucoma Neg        Social History:   Social History     Socioeconomic History    Marital status:    Tobacco Use    Smoking status: Former     Packs/day: 0.25     Types: Cigarettes     Quit date:      Years since quittin.5    Smokeless tobacco: Never    Tobacco comments:     [id number]   Vaping Use    Vaping Use: Never used   Substance and Sexual Activity    Alcohol use: Never    Drug use: Never   Social History Narrative    Just moved in from [address] [place] [address].  passed away and that is why moved from [address] [place] [address] to be with daughter who lives in [facility]. Currently lives with daughter. Medications:  Current Outpatient Medications   Medication Sig Dispense Refill    omeprazole 20 MG Oral Capsule Delayed Release Take 1 capsule (20 mg total) by mouth every morning. 30 capsule 0    leflunomide 20 MG Oral Tab Take 1 tablet (20 mg total) by mouth daily. LORATADINE 10 MG Oral Tab TAKE 1 TABLET BY MOUTH EVERY DAY 90 tablet 1    glycopyrrolate 1 MG Oral Tab Take 1 tablet (1 mg total) by mouth 3 (three) times daily.  90 tablet 1    carvedilol 25 MG Oral Tab Take 1 tablet (25 mg total) by mouth 2 (two) times daily with meals. 180 tablet 3    Glucose Blood (ACCU-CHEK GUIDE) In Vitro Strip Check blood sugar twice a day (fasting and before dinner). DX: E11.42, NIDDM 200 strip 1    BIOTIN OR Take by mouth. glipiZIDE 5 MG Oral Tab Take 1 tablet (5 mg total) by mouth 2 (two) times daily before meals. 180 tablet 0    cyclobenzaprine 10 MG Oral Tab Take 1 tablet (10 mg total) by mouth nightly. Patient is taking one tablet by mouth nightly as needed for muscle spasms. 90 tablet 1    valsartan 80 MG Oral Tab Take 1 tablet (80 mg total) by mouth daily. 90 tablet 3    spironolactone 25 MG Oral Tab Take 2 tablets (50 mg total) by mouth daily. 180 tablet 1    methylPREDNISolone 4 MG Oral Tablet Therapy Pack Take as directed on package. (Patient not taking: Reported on 6/13/2023) 1 each 0    hydroxychloroquine 200 MG Oral Tab Take 1 tablet (200 mg total) by mouth daily. 90 tablet 0    fluticasone-salmeterol (WIXELA INHUB) 250-50 MCG/ACT Inhalation Aerosol Powder, Breath Activated Inhale 1 puff into the lungs every 12 (twelve) hours. 1 each 5    albuterol 108 (90 Base) MCG/ACT Inhalation Aero Soln inhale 2 puff by inhalation route  every 4 - 6 hours as needed 1 each 5    traMADol 50 MG Oral Tab Take 1 tablet (50 mg total) by mouth every 12 (twelve) hours as needed for Pain. 20 tablet 0    predniSONE 10 MG Oral Tab Take 30 mg for 2 days then 20 mg for 2 days then 10 mg for one day and after that continue with your regular (Patient taking differently: Take 0.5 tablets (5 mg total) by mouth daily.) 11 tablet 0    montelukast (SINGULAIR) 10 MG Oral Tab Take 1 tablet (10 mg total) by mouth nightly. 10 tablet 0    atorvastatin 80 MG Oral Tab Take 1 tablet (80 mg total) by mouth daily. 990 tablet 1    Accu-Chek FastClix Lancets Does not apply Misc Check sugar once daily as directed 100 each 2    Blood Glucose Monitoring Suppl (ONETOUCH VERIO) w/Device Does not apply Kit 1 each daily. Test 1x daily 1 kit 0    Glucose Blood (ONETOUCH VERIO) In Vitro Strip Test 1x daily 100 strip 1    OneTouch Delica Lancets 30Z Does not apply Misc 4 x daily 100 each 1    Blood Glucose Monitoring Suppl (BLOOD GLUCOSE SYSTEM GILLES) Does not apply Kit DX E 11.9. Checks every morning. 1 kit 0    Lancets Does not apply Misc DX E 11.9. Checks every morning. 50 each 11    Blood Glucose Monitoring Suppl w/Device Does not apply Kit DX E 11.9. Checks every morning. 1 kit 0    Blood Gluc Meter Disp-Strips Does not apply Device DX E 11.9. Checks every morning. 100 each 11    acetaminophen 500 MG Oral Tab Take 1 tablet (500 mg total) by mouth every 6 (six) hours as needed for Pain. clonazePAM 0.5 MG Oral Tab Take 1 tablet (0.5 mg total) by mouth 2 (two) times daily as needed for Anxiety. Patient is taking one tablet by mouth every night at bedtime as needed for anxiety. aspirin 81 MG Oral Tab EC Take 325 mg by mouth daily.          Allergies:    Celebrex [Celecoxib]    PALPITATIONS  Penicillins             ITCHING, SWELLING  Metformin And Relat*    UNKNOWN  Olmesartan              UNKNOWN    ROS:     ROS    Positive for: Endocrine, Eyes  Negative for: Constitutional, Gastrointestinal, Neurological, Skin, Genitourinary, Musculoskeletal, HENT, Cardiovascular, Respiratory, Psychiatric, Allergic/Imm, Heme/Lymph  Last edited by Romana Husain OT on 7/13/2023  1:31 PM.          PHYSICAL EXAM:     Base Eye Exam       Visual Acuity (Snellen - Linear)         Right Left    Dist sc 20/40 +1 20/30 +1    Dist ph sc 20/25 -2 20/20 -3    Near cc 20/20- 20/20              Tonometry (Applanation, 1:56 PM)         Right Left    Pressure 14 14              Pachymetry (7/30/2022)         Right Left    Thickness 574/-2 565/-1              Pupils         Pupils    Right PERRL    Left PERRL              Visual Fields         Left Right     Full Full              Extraocular Movement         Right Left     Full, Ortho Full, Ortho Dilation       Both eyes: 1.0% Mydriacyl and 2.5% Alvin Synephrine @ 1:56 PM              Dilation #2       Both eyes: 1.0% Mydriacyl and 2.5% Alvin Synephrine @ 1:59 PM                  Additional Tests       Amsler         Right Left     Normal Normal              Color         Right Left    Ishihara 5/5 5/5                  Slit Lamp and Fundus Exam       Slit Lamp Exam         Right Left    Lids/Lashes Dermatochalasis, Meibomian gland dysfunction Dermatochalasis, Meibomian gland dysfunction    Conjunctiva/Sclera Normal Normal    Cornea 2+ arcus 2+ arcus    Anterior Chamber Deep and quiet Deep and quiet    Iris Normal Normal    Lens PC IOL slightly decentered temporally with YAG PC IOL with  YAG    Vitreous Vitreous floaters Vitreous floaters              Fundus Exam         Right Left    Disc Sloping margin, Temporal crescent Sloping margin, Temporal crescent    C/D Ratio 0.8 0.85    Macula Normal-no BDR, no Plaquenil Toxicity Normal-no BDR, no Plaquenil Toxicity    Vessels Normal Normal    Periphery Normal Normal                  Refraction       Wearing Rx         Sphere Cylinder    Right +2.00 Sphere    Left +2.00 Sphere      Type: OTC reading only              Manifest Refraction    Pt declines refraction, happy with OTC readers only                     ASSESSMENT/PLAN:     Diagnoses and Plan:     Pseudophakia of both eyes  No treatment  Continue with OTC reading glasses    Type 2 diabetes mellitus without retinopathy ([address]  Diabetes type II: no background of retinopathy, no signs of neovascularization noted. Discussed ocular and systemic benefits of blood sugar control. Diagnosis and treatment discussed in detail with patient. Vitreous floaters of both eyes   There is no evidence of retinal pathology. All signs and symptoms of retinal detachment/tears explained in detail.     Patient instructed to call the office if they experience increase in floaters, increase in flashes of light, loss of vision or curtain or veil effect. Glaucoma suspect of both eyes  Pt had normal testing in 2022. We will have patient back next year for DM EE and Photos. High risk medication use   No evidence of plaquenil toxicity in either eye. Will follow in 1 year for a plaquenil eye exam based on current guidelines. Patient is approved to continue on plaquenil as directed by their PCP or rheumatologist.     Meibomian gland dysfunction (MGD) of both eyes  Patient was instructed to use warm compresses to the eyelids twice a day everyday. Instructions for warm compress use:   Patient should place wash compresses on both eyelids for 5 minutes every morning and every night. After 5 minutes of holding the warm compresses on the eyelids, patient should gently rub the eyelashes and then rinse thoroughly with warm water. No orders of the defined types were placed in this encounter. Meds This Visit:  Requested Prescriptions      No prescriptions requested or ordered in this encounter        Follow up instructions:  Return in about 1 year (around 7/13/2024) for Plaquenil and DM Eye Exam and photos . 7/13/2023  Scribed by: Alma Núñez MD        If you have questions, please do not hesitate to call me. I look forward to following Frances Collins along with you.     Sincerely,        Alma Núñez MD        CC:   No Recipients    Document electronically generated by: Alma Núñez MD

## (undated) NOTE — [place] [address]  Authorization for Invasive Procedures  Date: ***           Time: ***    I hereby authorize ***, my physician and his/her assistants (if applicable), which may include medical students, residents, and/or fellows, to perform the following surgical operation/ procedure and administer such anesthesia as may be determined necessary by my physician: *** on Chester Mendezenson  2.   I recognize that during the surgical operation/procedure, unforeseen conditions may necessitate additional or different procedures than those listed above.  I, therefore, further authorize and request that the above-named surgeon, assistants, or designees perform such procedures as are, in their judgment, necessary and desirable.    3.   My surgeon/physician has discussed prior to my surgery the potential benefits, risks and side effects of this procedure; the likelihood of achieving goals; and potential problems that might occur during recuperation.  They also discussed reasonable alternatives to the procedure, including risks, benefits, and side effects related to the alternatives and risks related to not receiving this procedure.  I have had all my questions answered and I acknowledge that no guarantee has been made as to the result that may be obtained.    4.   Should the need arise during my operation/procedure, which includes change of level of care prior to discharge, I also consent to the administration of blood and/or blood products.  Further, I understand that despite careful testing and screening of blood or blood products by collecting agencies, I may still be subject to ill effects as a result of receiving a blood transfusion and/or blood products.  The following are some, but not all, of the potential risks that can occur: fever and allergic reactions, hemolytic reactions, transmission of diseases such as Hepatitis, AIDS and Cytomegalovirus (CMV) and fluid overload.  In the  event that I wish to have an autologous transfusion of my own blood, or a directed donor transfusion, I will discuss this with my physician.   Check only if Refusing Blood or Blood Products  I understand refusal of blood or blood products as deemed necessary by my physician may have serious consequences to my condition to include possible death. I hereby assume responsibility for my refusal and release the hospital, its personnel, and my physicians from any responsibility for the consequences of my refusal.         o  Refuse         5.   I authorize the use of any specimen, organs, tissues, body parts or foreign objects that may be removed from my body during the operation/procedure for diagnosis, research or teaching purposes and their subsequent disposal by hospital authorities.  I also authorize the release of specimen test results and/or written reports to my treating physician on the hospital medical staff or other referring or consulting physicians involved in my care, at the discretion of the Pathologist or my treating physician.    6.   I consent to the photographing or videotaping of the operations or procedures to be performed, including appropriate portions of my body for medical, scientific, or educational purposes, provided my identity is not revealed by the pictures or by descriptive texts accompanying them.  If the procedure has been photographed/videotaped, the surgeon will obtain the original picture, image, videotape or CD.  The hospital will not be responsible for storage, release or maintenance of the picture, image, tape or CD.    7.   I consent to the presence of a  or observers in the operating room as deemed necessary by my physician or their designees.    8.   I recognize that in the event my procedure results in extended X-Ray/fluoroscopy time, I may develop a skin reaction.    9. If I have a Do Not Attempt Resuscitation (DNAR) order in place, that status will be suspended  while in the operating room, procedural suite, and during the recovery period unless otherwise explicitly stated by me (or a person authorized to consent on my behalf). The surgeon or my attending physician will determine when the applicable recovery period ends for purposes of reinstating the DNAR order.  10. Patients having a sterilization procedure: I understand that if the procedure is successful the results will be permanent and it will therefore be impossible for me to inseminate, conceive, or bear children.  I also understand that the procedure is intended to result in sterility, although the result has not been guaranteed.   11. I acknowledge that my physician has explained sedation/analgesia administration to me including the risk and benefits I consent to the administration of sedation/analgesia as may be necessary or desirable in the judgment of my physician.    I CERTIFY THAT I HAVE READ AND FULLY UNDERSTAND THE ABOVE CONSENT TO OPERATION and/or OTHER PROCEDURE.        ____________________________________       _________________________________      ______________________________  Signature of Patient         Signature of Responsible Person        Printed Name of Responsible Person        ____________________________________      _________________________________      ______________________________       Signature of Witness          Relationship to Patient                       Date                                       Time  Patient Name: Chester Bautista  : 1940    Reviewed: 2024   Printed: 2025  Medical Record #: [id number] Page 1 of 2             STATEMENT OF PHYSICIAN My signature below affirms that prior to the time of the procedure; I have explained to the patient and/or his/her legal representative, the risks and benefits involved in the proposed treatment and any reasonable alternative to the proposed treatment. I have also explained the risks and benefits  involved in refusal of the proposed treatment and alternatives to the proposed treatment and have answered the patient's questions. If I have a significant financial interest in a co-management agreement or a significant financial interest in any product or implant, or other significant relationship used in this procedure/surgery, I have disclosed this and had a discussion with my patient.     _______________________________________________________________ _____________________________  (Signature of Physician)                                                                                         (Date)                                   (Time)  Patient Name: Chester Bautista  : 1940    Reviewed: 2024   Printed: 2025  Medical Record #: [id number] Page 2 of 2

## (undated) NOTE — LETTER
[address]  [address] [place], [place]  Authorization for Surgical Operation and Procedure                                                                                           I hereby authorize Carmen Griffith MD, my physician and his/her assistants (if applicable), which may include medical students, residents, and/or fellows, to perform the following surgical operation/ procedure and administer such anesthesia as may be determined necessary by my physician: Operation/Procedure name (s) CAPSULE ENDOSCOPY on Therman Angelucci   2. I recognize that during the surgical operation/procedure, unforeseen conditions may necessitate additional or different procedures than those listed above. I, therefore, further authorize and request that the above-named surgeon, assistants, or designees perform such procedures as are, in their judgment, necessary and desirable. 3.   My surgeon/physician has discussed prior to my surgery the potential benefits, risks and side effects of this procedure; the likelihood of achieving goals; and potential problems that might occur during recuperation. They also discussed reasonable alternatives to the procedure, including risks, benefits, and side effects related to the alternatives and risks related to not receiving this procedure. I have had all my questions answered and I acknowledge that no guarantee has been made as to the result that may be obtained. 4.   Should the need arise during my operation/procedure, which includes change of level of care prior to discharge, I also consent to the administration of blood and/or blood products. Further, I understand that despite careful testing and screening of blood or blood products by collecting agencies, I may still be subject to ill effects as a result of receiving a blood transfusion and/or blood products.   The following are some, but not all, of the potential risks that can occur: fever and allergic reactions, hemolytic reactions, transmission of diseases such as Hepatitis, AIDS and Cytomegalovirus (CMV) and fluid overload. In the event that I wish to have an autologous transfusion of my own blood, or a directed donor transfusion, I will discuss this with my physician. Check only if Refusing Blood or Blood Products  I understand refusal of blood or blood products as deemed necessary by my physician may have serious consequences to my condition to include possible death. I hereby assume responsibility for my refusal and release the hospital, its personnel, and my physicians from any responsibility for the consequences of my refusal.    o  Refuse   5. I authorize the use of any specimen, organs, tissues, body parts or foreign objects that may be removed from my body during the operation/procedure for diagnosis, research or teaching purposes and their subsequent disposal by hospital authorities. I also authorize the release of specimen test results and/or written reports to my treating physician on the hospital medical staff or other referring or consulting physicians involved in my care, at the discretion of the Pathologist or my treating physician. 6.   I consent to the photographing or videotaping of the operations or procedures to be performed, including appropriate portions of my body for medical, scientific, or educational purposes, provided my identity is not revealed by the pictures or by descriptive texts accompanying them. If the procedure has been photographed/videotaped, the surgeon will obtain the original picture, image, videotape or CD. The hospital will not be responsible for storage, release or maintenance of the picture, image, tape or CD.    7.   I consent to the presence of a  or observers in the operating room as deemed necessary by my physician or their designees.     8.   I recognize that in the event my procedure results in extended X-Ray/fluoroscopy time, I may develop a skin reaction. 9. If I have a Do Not Attempt Resuscitation (DNAR) order in place, that status will be suspended while in the operating room, procedural suite, and during the recovery period unless otherwise explicitly stated by me (or a person authorized to consent on my behalf). The surgeon or my attending physician will determine when the applicable recovery period ends for purposes of reinstating the DNAR order. 10. Patients having a sterilization procedure: I understand that if the procedure is successful the results will be permanent and it will therefore be impossible for me to inseminate, conceive, or bear children. I also understand that the procedure is intended to result in sterility, although the result has not been guaranteed. 11. I acknowledge that my physician has explained sedation/analgesia administration to me including the risk and benefits I consent to the administration of sedation/analgesia as may be necessary or desirable in the judgment of my physician. I CERTIFY THAT I HAVE READ AND FULLY UNDERSTAND THE ABOVE CONSENT TO OPERATION and/or OTHER PROCEDURE.     _________________________________________ _________________________________     ___________________________________  Signature of Patient     Signature of Responsible Person                   Printed Name of Responsible Person                              _________________________________________ ______________________________        ___________________________________  Signature of Witness         Date  Time         Relationship to Patient    STATEMENT OF PHYSICIAN My signature below affirms that prior to the time of the procedure; I have explained to the patient and/or his/her legal representative, the risks and benefits involved in the proposed treatment and any reasonable alternative to the proposed treatment.  I have also explained the risks and benefits involved in refusal of the proposed treatment and alternatives to the proposed treatment and have answered the patient's questions.  If I have a significant financial interest in a co-management agreement or a significant financial interest in any product or implant, or other significant relationship used in this procedure/surgery, I have disclosed this and had a discussion with my patient.     _______________________________________________________________ _____________________________  (Signature of Physician)                                                                                         (Date)                                   (Time)  Patient Name: Bart Guillory    : 1940   Printed: 2023      Medical Record #: [id number]                                              Page 1 of 1

## (undated) NOTE — Clinical Note
Already consulted with you.  Patient to take one extra Glipizide 5 mg today before dinner, increase lantus to 30 units at bedtime, and increase glipizide to 10 mg before breakfast.